# Patient Record
Sex: MALE | Race: BLACK OR AFRICAN AMERICAN | Employment: OTHER | URBAN - METROPOLITAN AREA
[De-identification: names, ages, dates, MRNs, and addresses within clinical notes are randomized per-mention and may not be internally consistent; named-entity substitution may affect disease eponyms.]

---

## 2018-02-11 PROBLEM — N39.0 UTI (URINARY TRACT INFECTION): Status: ACTIVE | Noted: 2018-02-11

## 2018-04-11 PROBLEM — N39.0 UTI (URINARY TRACT INFECTION): Status: RESOLVED | Noted: 2018-02-11 | Resolved: 2018-04-11

## 2020-05-04 ENCOUNTER — APPOINTMENT (OUTPATIENT)
Dept: GENERAL RADIOLOGY | Age: 80
DRG: 699 | End: 2020-05-04
Payer: MEDICARE

## 2020-05-04 ENCOUNTER — APPOINTMENT (OUTPATIENT)
Dept: CT IMAGING | Age: 80
DRG: 699 | End: 2020-05-04
Payer: MEDICARE

## 2020-05-04 ENCOUNTER — HOSPITAL ENCOUNTER (INPATIENT)
Age: 80
LOS: 2 days | Discharge: HOME HEALTH | DRG: 699 | End: 2020-05-07
Attending: INTERNAL MEDICINE | Admitting: INTERNAL MEDICINE
Payer: MEDICARE

## 2020-05-04 PROBLEM — N39.0 COMPLICATED UTI (URINARY TRACT INFECTION): Status: ACTIVE | Noted: 2020-05-04

## 2020-05-04 LAB
ALBUMIN SERPL-MCNC: 3.7 GM/DL (ref 3.4–5)
ALP BLD-CCNC: 120 IU/L (ref 40–129)
ALT SERPL-CCNC: 13 U/L (ref 10–40)
ANION GAP SERPL CALCULATED.3IONS-SCNC: 10 MMOL/L (ref 4–16)
AST SERPL-CCNC: 12 IU/L (ref 15–37)
BACTERIA: ABNORMAL /HPF
BASE EXCESS: 2 (ref 0–3.3)
BASOPHILS ABSOLUTE: 0 K/CU MM
BASOPHILS RELATIVE PERCENT: 0.5 % (ref 0–1)
BETA-HYDROXYBUTYRATE: 1.7 MG/DL (ref 0–3)
BILIRUB SERPL-MCNC: 0.2 MG/DL (ref 0–1)
BILIRUBIN URINE: NEGATIVE MG/DL
BLOOD, URINE: ABNORMAL
BUN BLDV-MCNC: 32 MG/DL (ref 6–23)
CALCIUM SERPL-MCNC: 9 MG/DL (ref 8.3–10.6)
CHLORIDE BLD-SCNC: 97 MMOL/L (ref 99–110)
CLARITY: ABNORMAL
CO2: 22 MMOL/L (ref 21–32)
COLOR: YELLOW
COMMENT: ABNORMAL
CREAT SERPL-MCNC: 1.7 MG/DL (ref 0.9–1.3)
DIFFERENTIAL TYPE: ABNORMAL
EOSINOPHILS ABSOLUTE: 0.2 K/CU MM
EOSINOPHILS RELATIVE PERCENT: 2.9 % (ref 0–3)
GFR AFRICAN AMERICAN: 47 ML/MIN/1.73M2
GFR NON-AFRICAN AMERICAN: 39 ML/MIN/1.73M2
GLUCOSE BLD-MCNC: 300 MG/DL (ref 70–99)
GLUCOSE BLD-MCNC: 378 MG/DL
GLUCOSE BLD-MCNC: 378 MG/DL (ref 70–99)
GLUCOSE BLD-MCNC: 383 MG/DL (ref 70–99)
GLUCOSE BLD-MCNC: 397 MG/DL (ref 70–99)
GLUCOSE BLD-MCNC: 398 MG/DL (ref 70–99)
GLUCOSE, URINE: >500 MG/DL
HCO3 VENOUS: 20.1 MMOL/L (ref 19–25)
HCT VFR BLD CALC: 32.5 % (ref 42–52)
HEMOGLOBIN: 10.2 GM/DL (ref 13.5–18)
IMMATURE NEUTROPHIL %: 0.3 % (ref 0–0.43)
KETONES, URINE: NEGATIVE MG/DL
LEUKOCYTE ESTERASE, URINE: ABNORMAL
LIPASE: 49 IU/L (ref 13–60)
LYMPHOCYTES ABSOLUTE: 1.2 K/CU MM
LYMPHOCYTES RELATIVE PERCENT: 15.6 % (ref 24–44)
MCH RBC QN AUTO: 30.3 PG (ref 27–31)
MCHC RBC AUTO-ENTMCNC: 31.4 % (ref 32–36)
MCV RBC AUTO: 96.4 FL (ref 78–100)
MONOCYTES ABSOLUTE: 0.6 K/CU MM
MONOCYTES RELATIVE PERCENT: 7.3 % (ref 0–4)
MUCUS: ABNORMAL HPF
NITRITE URINE, QUANTITATIVE: POSITIVE
NUCLEATED RBC %: 0 %
O2 SAT, VEN: 87.5 % (ref 50–70)
PCO2, VEN: 27 MMHG (ref 38–52)
PDW BLD-RTO: 14.1 % (ref 11.7–14.9)
PH VENOUS: 7.48 (ref 7.32–7.42)
PH, URINE: 5 (ref 5–8)
PLATELET # BLD: 256 K/CU MM (ref 140–440)
PMV BLD AUTO: 11.7 FL (ref 7.5–11.1)
PO2, VEN: 238 MMHG (ref 28–48)
POTASSIUM SERPL-SCNC: 4.4 MMOL/L (ref 3.5–5.1)
PROTEIN UA: 30 MG/DL
RBC # BLD: 3.37 M/CU MM (ref 4.6–6.2)
RBC URINE: 4 /HPF (ref 0–3)
SEGMENTED NEUTROPHILS ABSOLUTE COUNT: 5.5 K/CU MM
SEGMENTED NEUTROPHILS RELATIVE PERCENT: 73.4 % (ref 36–66)
SODIUM BLD-SCNC: 129 MMOL/L (ref 135–145)
SPECIFIC GRAVITY UA: 1.02 (ref 1–1.03)
SQUAMOUS EPITHELIAL: 1 /HPF
TOTAL IMMATURE NEUTOROPHIL: 0.02 K/CU MM
TOTAL NUCLEATED RBC: 0 K/CU MM
TOTAL PROTEIN: 7 GM/DL (ref 6.4–8.2)
TRICHOMONAS: ABNORMAL /HPF
TROPONIN T: 0.02 NG/ML
TROPONIN T: 0.04 NG/ML
UROBILINOGEN, URINE: NORMAL MG/DL (ref 0.2–1)
WBC # BLD: 7.6 K/CU MM (ref 4–10.5)
WBC CLUMP: ABNORMAL /HPF
WBC UA: 612 /HPF (ref 0–2)

## 2020-05-04 PROCEDURE — 2580000003 HC RX 258: Performed by: PHYSICIAN ASSISTANT

## 2020-05-04 PROCEDURE — 82962 GLUCOSE BLOOD TEST: CPT

## 2020-05-04 PROCEDURE — 71045 X-RAY EXAM CHEST 1 VIEW: CPT

## 2020-05-04 PROCEDURE — 6370000000 HC RX 637 (ALT 250 FOR IP): Performed by: PHYSICIAN ASSISTANT

## 2020-05-04 PROCEDURE — 84484 ASSAY OF TROPONIN QUANT: CPT

## 2020-05-04 PROCEDURE — 6370000000 HC RX 637 (ALT 250 FOR IP): Performed by: NURSE PRACTITIONER

## 2020-05-04 PROCEDURE — 80053 COMPREHEN METABOLIC PANEL: CPT

## 2020-05-04 PROCEDURE — 96372 THER/PROPH/DIAG INJ SC/IM: CPT

## 2020-05-04 PROCEDURE — 74177 CT ABD & PELVIS W/CONTRAST: CPT

## 2020-05-04 PROCEDURE — G0378 HOSPITAL OBSERVATION PER HR: HCPCS

## 2020-05-04 PROCEDURE — 87086 URINE CULTURE/COLONY COUNT: CPT

## 2020-05-04 PROCEDURE — 6360000004 HC RX CONTRAST MEDICATION: Performed by: PHYSICIAN ASSISTANT

## 2020-05-04 PROCEDURE — 96361 HYDRATE IV INFUSION ADD-ON: CPT

## 2020-05-04 PROCEDURE — 87186 SC STD MICRODIL/AGAR DIL: CPT

## 2020-05-04 PROCEDURE — 96365 THER/PROPH/DIAG IV INF INIT: CPT

## 2020-05-04 PROCEDURE — 82805 BLOOD GASES W/O2 SATURATION: CPT

## 2020-05-04 PROCEDURE — 93005 ELECTROCARDIOGRAM TRACING: CPT | Performed by: PHYSICIAN ASSISTANT

## 2020-05-04 PROCEDURE — 93005 ELECTROCARDIOGRAM TRACING: CPT | Performed by: EMERGENCY MEDICINE

## 2020-05-04 PROCEDURE — 85025 COMPLETE CBC W/AUTO DIFF WBC: CPT

## 2020-05-04 PROCEDURE — 6360000002 HC RX W HCPCS: Performed by: NURSE PRACTITIONER

## 2020-05-04 PROCEDURE — 87077 CULTURE AEROBIC IDENTIFY: CPT

## 2020-05-04 PROCEDURE — 2580000003 HC RX 258: Performed by: NURSE PRACTITIONER

## 2020-05-04 PROCEDURE — 82010 KETONE BODYS QUAN: CPT

## 2020-05-04 PROCEDURE — 83690 ASSAY OF LIPASE: CPT

## 2020-05-04 PROCEDURE — 99285 EMERGENCY DEPT VISIT HI MDM: CPT

## 2020-05-04 PROCEDURE — 6360000002 HC RX W HCPCS: Performed by: PHYSICIAN ASSISTANT

## 2020-05-04 PROCEDURE — 81001 URINALYSIS AUTO W/SCOPE: CPT

## 2020-05-04 RX ORDER — SODIUM CHLORIDE 9 MG/ML
INJECTION, SOLUTION INTRAVENOUS CONTINUOUS
Status: DISCONTINUED | OUTPATIENT
Start: 2020-05-04 | End: 2020-05-07 | Stop reason: HOSPADM

## 2020-05-04 RX ORDER — HYDROCHLOROTHIAZIDE 12.5 MG/1
12.5 CAPSULE, GELATIN COATED ORAL DAILY
Status: ON HOLD | COMMUNITY
End: 2020-05-07 | Stop reason: HOSPADM

## 2020-05-04 RX ORDER — ACETAMINOPHEN 650 MG/1
650 SUPPOSITORY RECTAL EVERY 6 HOURS PRN
Status: DISCONTINUED | OUTPATIENT
Start: 2020-05-04 | End: 2020-05-07 | Stop reason: HOSPADM

## 2020-05-04 RX ORDER — DOCUSATE SODIUM 100 MG/1
100 CAPSULE, LIQUID FILLED ORAL 2 TIMES DAILY PRN
Status: DISCONTINUED | OUTPATIENT
Start: 2020-05-04 | End: 2020-05-07 | Stop reason: HOSPADM

## 2020-05-04 RX ORDER — LABETALOL 200 MG/1
300 TABLET, FILM COATED ORAL 2 TIMES DAILY
Status: DISCONTINUED | OUTPATIENT
Start: 2020-05-04 | End: 2020-05-07 | Stop reason: HOSPADM

## 2020-05-04 RX ORDER — ASPIRIN 81 MG/1
324 TABLET, CHEWABLE ORAL ONCE
Status: COMPLETED | OUTPATIENT
Start: 2020-05-04 | End: 2020-05-04

## 2020-05-04 RX ORDER — POLYETHYLENE GLYCOL 3350 17 G/17G
17 POWDER, FOR SOLUTION ORAL DAILY PRN
Status: DISCONTINUED | OUTPATIENT
Start: 2020-05-04 | End: 2020-05-07 | Stop reason: HOSPADM

## 2020-05-04 RX ORDER — SODIUM CHLORIDE 0.9 % (FLUSH) 0.9 %
10 SYRINGE (ML) INJECTION EVERY 12 HOURS SCHEDULED
Status: DISCONTINUED | OUTPATIENT
Start: 2020-05-04 | End: 2020-05-07 | Stop reason: HOSPADM

## 2020-05-04 RX ORDER — 0.9 % SODIUM CHLORIDE 0.9 %
1000 INTRAVENOUS SOLUTION INTRAVENOUS ONCE
Status: COMPLETED | OUTPATIENT
Start: 2020-05-04 | End: 2020-05-04

## 2020-05-04 RX ORDER — ACETAMINOPHEN 325 MG/1
650 TABLET ORAL EVERY 6 HOURS PRN
Status: DISCONTINUED | OUTPATIENT
Start: 2020-05-04 | End: 2020-05-07 | Stop reason: HOSPADM

## 2020-05-04 RX ORDER — SODIUM CHLORIDE 0.9 % (FLUSH) 0.9 %
10 SYRINGE (ML) INJECTION PRN
Status: DISCONTINUED | OUTPATIENT
Start: 2020-05-04 | End: 2020-05-07 | Stop reason: HOSPADM

## 2020-05-04 RX ORDER — IPRATROPIUM BROMIDE AND ALBUTEROL SULFATE 2.5; .5 MG/3ML; MG/3ML
SOLUTION RESPIRATORY (INHALATION)
Status: COMPLETED
Start: 2020-05-04 | End: 2020-05-04

## 2020-05-04 RX ORDER — NICOTINE POLACRILEX 4 MG
15 LOZENGE BUCCAL PRN
Status: DISCONTINUED | OUTPATIENT
Start: 2020-05-04 | End: 2020-05-07 | Stop reason: HOSPADM

## 2020-05-04 RX ORDER — PROMETHAZINE HYDROCHLORIDE 25 MG/1
12.5 TABLET ORAL EVERY 6 HOURS PRN
Status: DISCONTINUED | OUTPATIENT
Start: 2020-05-04 | End: 2020-05-07 | Stop reason: HOSPADM

## 2020-05-04 RX ORDER — DEXTROSE MONOHYDRATE 50 MG/ML
100 INJECTION, SOLUTION INTRAVENOUS PRN
Status: DISCONTINUED | OUTPATIENT
Start: 2020-05-04 | End: 2020-05-07 | Stop reason: HOSPADM

## 2020-05-04 RX ORDER — ONDANSETRON 2 MG/ML
4 INJECTION INTRAMUSCULAR; INTRAVENOUS EVERY 6 HOURS PRN
Status: DISCONTINUED | OUTPATIENT
Start: 2020-05-04 | End: 2020-05-07 | Stop reason: HOSPADM

## 2020-05-04 RX ORDER — DEXTROSE MONOHYDRATE 25 G/50ML
12.5 INJECTION, SOLUTION INTRAVENOUS PRN
Status: DISCONTINUED | OUTPATIENT
Start: 2020-05-04 | End: 2020-05-07 | Stop reason: HOSPADM

## 2020-05-04 RX ADMIN — SODIUM CHLORIDE 1000 ML: 9 INJECTION, SOLUTION INTRAVENOUS at 15:51

## 2020-05-04 RX ADMIN — SODIUM CHLORIDE: 9 INJECTION, SOLUTION INTRAVENOUS at 21:43

## 2020-05-04 RX ADMIN — ENOXAPARIN SODIUM 30 MG: 30 INJECTION SUBCUTANEOUS at 20:34

## 2020-05-04 RX ADMIN — LABETALOL HYDROCHLORIDE 300 MG: 200 TABLET, FILM COATED ORAL at 21:40

## 2020-05-04 RX ADMIN — CEFTRIAXONE SODIUM 1 G: 1 INJECTION, POWDER, FOR SOLUTION INTRAMUSCULAR; INTRAVENOUS at 16:10

## 2020-05-04 RX ADMIN — ASPIRIN 81 MG 324 MG: 81 TABLET ORAL at 15:47

## 2020-05-04 RX ADMIN — IOPAMIDOL 75 ML: 755 INJECTION, SOLUTION INTRAVENOUS at 16:56

## 2020-05-04 NOTE — ED PROVIDER NOTES
EKG obtained at 1558    Sinus rhythm with first-degree AV block, rate of 60 bpm, MS interval of 240 ms, otherwise normal intervals, no ST elevation. Otherwise normal EKG.   No previous to compare     Korin Damon MD  05/04/20 0868

## 2020-05-04 NOTE — ED NOTES
Clean catch urine obtained via mccrary cath to urinal and not out of leg bag. Specimen obtained out of urinal and transferred to specimen cup and sent to lab. Pt tolerated well. Pt updated on plan of care.  Pt denies needs at this time     Miriam Ayala RN  05/04/20 8386

## 2020-05-04 NOTE — ED PROVIDER NOTES
RBC 3.37 (L) 4.6 - 6.2 M/CU MM    Hemoglobin 10.2 (L) 13.5 - 18.0 GM/DL    Hematocrit 32.5 (L) 42 - 52 %    MCV 96.4 78 - 100 FL    MCH 30.3 27 - 31 PG    MCHC 31.4 (L) 32.0 - 36.0 %    RDW 14.1 11.7 - 14.9 %    Platelets 991 753 - 449 K/CU MM    MPV 11.7 (H) 7.5 - 11.1 FL    Differential Type AUTOMATED DIFFERENTIAL     Segs Relative 73.4 (H) 36 - 66 %    Lymphocytes % 15.6 (L) 24 - 44 %    Monocytes % 7.3 (H) 0 - 4 %    Eosinophils % 2.9 0 - 3 %    Basophils % 0.5 0 - 1 %    Segs Absolute 5.5 K/CU MM    Lymphocytes Absolute 1.2 K/CU MM    Monocytes Absolute 0.6 K/CU MM    Eosinophils Absolute 0.2 K/CU MM    Basophils Absolute 0.0 K/CU MM    Nucleated RBC % 0.0 %    Total Nucleated RBC 0.0 K/CU MM    Total Immature Neutrophil 0.02 K/CU MM    Immature Neutrophil % 0.3 0 - 0.43 %   CMP   Result Value Ref Range    Sodium 129 (L) 135 - 145 MMOL/L    Potassium 4.4 3.5 - 5.1 MMOL/L    Chloride 97 (L) 99 - 110 mMol/L    CO2 22 21 - 32 MMOL/L    BUN 32 (H) 6 - 23 MG/DL    CREATININE 1.7 (H) 0.9 - 1.3 MG/DL    Glucose 398 (H) 70 - 99 MG/DL    Calcium 9.0 8.3 - 10.6 MG/DL    Alb 3.7 3.4 - 5.0 GM/DL    Total Protein 7.0 6.4 - 8.2 GM/DL    Total Bilirubin 0.2 0.0 - 1.0 MG/DL    ALT 13 10 - 40 U/L    AST 12 (L) 15 - 37 IU/L    Alkaline Phosphatase 120 40 - 129 IU/L    GFR Non- 39 (L) >60 mL/min/1.73m2    GFR  47 (L) >60 mL/min/1.73m2    Anion Gap 10 4 - 16   Lipase   Result Value Ref Range    Lipase 49 13 - 60 IU/L   Urinalysis   Result Value Ref Range    Color, UA YELLOW YELLOW    Clarity, UA SLIGHTLY CLOUDY (A) CLEAR    Glucose, Urine >500 (A) NEGATIVE MG/DL    Bilirubin Urine NEGATIVE NEGATIVE MG/DL    Ketones, Urine NEGATIVE NEGATIVE MG/DL    Specific Gravity, UA 1.017 1.001 - 1.035    Blood, Urine SMALL (A) NEGATIVE    pH, Urine 5.0 5.0 - 8.0    Protein, UA 30 (A) NEGATIVE MG/DL    Urobilinogen, Urine NORMAL 0.2 - 1.0 MG/DL    Nitrite Urine, Quantitative POSITIVE (A) NEGATIVE    Leukocyte Esterase, Urine MODERATE (A) NEGATIVE    RBC, UA 4 (H) 0 - 3 /HPF    WBC,  (H) 0 - 2 /HPF    Bacteria, UA MANY (A) NEGATIVE /HPF    WBC Clumps, UA MODERATE /HPF    Squam Epithel, UA 1 /HPF    Mucus, UA RARE (A) NEGATIVE HPF    Trichomonas, UA NONE SEEN NONE SEEN /HPF   Beta-Hydroxybutyrate   Result Value Ref Range    Beta-Hydroxybutyrate 1.7 0.0 - 3.0 MG/DL   Troponin   Result Value Ref Range    Troponin T 0.035 (H) <0.01 NG/ML   POC Blood Glucose   Result Value Ref Range    Glucose 378 mg/dL   POCT Glucose   Result Value Ref Range    POC Glucose 383 (H) 70 - 99 MG/DL   POCT Glucose   Result Value Ref Range    POC Glucose 378 (H) 70 - 99 MG/DL   POCT Glucose   Result Value Ref Range    POC Glucose 300 (H) 70 - 99 MG/DL   EKG 12 Lead   Result Value Ref Range    Ventricular Rate 67 BPM    Atrial Rate 67 BPM    P-R Interval 206 ms    QRS Duration 70 ms    Q-T Interval 400 ms    QTc Calculation (Bazett) 422 ms    P Axis 78 degrees    R Axis 2 degrees    T Axis 21 degrees    Diagnosis       Undetermined rhythm  Otherwise normal ECG  When compared with ECG of 13-AUG-2018 14:24,  Current undetermined rhythm precludes rhythm comparison, needs review         EKG Interpretation  Please see ED physician's note for EKG interpretation      RADIOLOGY    XR CHEST PORTABLE   Final Result   No acute cardiopulmonary findings. CT ABDOMEN PELVIS W IV CONTRAST    (Results Pending)         ED COURSE & MEDICAL DECISION MAKING       Vital signs and nursing notes reviewed during ED course. I have independently evaluated this patient . Supervising MD present in the Emergency Department, available for consultation, throughout entirety of  patient care. With ongoing COVID-19 pandemic I did have mask, protective eyewear and gloves during my interactions with patient. Patient presents as above. He is hemodynamically stable, afebrile, not tachycardic. Point-of-care glucose on arrival 383. Fluids initiated.    Lab work with

## 2020-05-04 NOTE — H&P
nightly, Disp: , Rfl:     docusate sodium (COLACE, DULCOLAX) 100 MG CAPS, Take 100 mg by mouth 2 times daily as needed for Constipation. , Disp: 20 capsule, Rfl: 0    labetalol (NORMODYNE) 300 MG tablet, Take 1 tablet by mouth 2 times daily. , Disp: 60 tablet, Rfl: 5    glimepiride (AMARYL) 4 MG tablet, Take 4 mg by mouth every morning (before breakfast). , Disp: , Rfl:     History of present illness     Chief Complaint: Hyperglycemia and Fatigue      Rossi Natarajan is a 78 y.o.  male  who presents with generalized weakness and hyperglycemia that started this morning. Patient states this morning his leg gave out and was on the floor for 10 minutes. He was able to get up then he went back to his room. He decided to check his BG because he taught that probably his BG dropped and it was over 440. Patient has never had his BG go up that high. He decided to come to ED. He has chronic suprapubic catheter secondary to prostate cancer. His catheter was changed 2 weeks and he saw Dr. Margarita Kerns then. Denies fever, chills, abd. Pain, N/V/D, or penile discharge. Other medical hx are HTN, HLD, OA, DMII, and former smoker. Review of Systems   Ten point ROS reviewed and negative, unless as noted below. GENERAL:  Denies fever, chills, night sweats, or changes in weight. EYES:  Denies recent visual changes. ENT:  Denies ear pain, hearing loss or tinnitus  RESP:  Denies any cough, dyspnea, or wheezing. CV:  Denies any chest pain with exertion or at rest, palpitations, syncope, or edema. GI:  Denies any dysphagia, nausea, vomiting, abdominal pain, heartburn, changes in bowel habit, melena or rectal bleeding  MUSCULOSKELETAL:  Denies any joint swelling, joint pain, or loss of range of motion. NEURO:  Denies any headaches, tremors, dizziness, vertigo, memory loss, confusion, generalized weakness, numbness or tingling. PSYCH:  Denies any sleeping problems, history of abuse, marital discord.   HEME/LYMPHATIC/IMMUNO:  Denies , bruising, bleeding abnormalities   ENDO:  Denies any heat or cold intolerance, panemiaolyuria or polydipsia. Hyperglycemia      Objective:   No intake or output data in the 24 hours ending 05/04/20 1710   Vitals:   Vitals:    05/04/20 1632   BP: (!) 155/89   Pulse: 66   Resp: 14   Temp:    SpO2: 100%     Physical Exam:   Gen:  awake, alert, cooperative, no apparent distress  EYES:Lids and lashes normal, pupils equal, round ,extra ocular muscles intact, sclera clear, conjunctiva normal  ENT:  Normocephalic, oral pharynx with moist mucus membranes  NECK:  Supple, symmetrical, trachea midline, no adenopathy,  LUNGS:  Clear to auscultate bilaterally, no rales ronchi or wheezing noted. CARDIOVASCULAR:  regular rate and rhythm, normal S1 and S2,no murmur noted, peripheral pulses 2+, no pitting edema  ABDOMEN: Normal BS, Non tender, non distended, no HSM noted. Suprapubic catheter in place. No erythema, drainage, or swelling noted  MUSCULOSKELETAL:  ROM of all extremities grossly wnl  NEUROLOGIC: AOx 3,  Cranial nerves II-XII are grossly intact. Motor is 5 out of 5 bilaterally. Sensory is intact, no lateralizing findings. 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     Allergies: No Known Allergies    FAM HX: family history includes Cancer in his brother, maternal uncle, and mother; Diabetes in his brother, father, maternal

## 2020-05-04 NOTE — ED NOTES
Pt updated on plan of care. Pt denies needs a this time. No distress noted. This RN will continue to monitor patient for needs.  Call light in reach     Joe Hernandes RN  05/04/20 2173

## 2020-05-04 NOTE — ED NOTES
1642 paged hospitalist     Bekah Alexander  05/04/20 8094  [facility] with apogee in ED.      Bekah Alexander  05/04/20 2144

## 2020-05-05 PROBLEM — N39.0 UTI (URINARY TRACT INFECTION): Status: ACTIVE | Noted: 2020-05-05

## 2020-05-05 LAB
ANION GAP SERPL CALCULATED.3IONS-SCNC: 11 MMOL/L (ref 4–16)
BUN BLDV-MCNC: 28 MG/DL (ref 6–23)
CALCIUM SERPL-MCNC: 9 MG/DL (ref 8.3–10.6)
CHLORIDE BLD-SCNC: 102 MMOL/L (ref 99–110)
CO2: 22 MMOL/L (ref 21–32)
CREAT SERPL-MCNC: 1.6 MG/DL (ref 0.9–1.3)
EKG ATRIAL RATE: 67 BPM
EKG DIAGNOSIS: NORMAL
EKG P AXIS: 78 DEGREES
EKG P-R INTERVAL: 206 MS
EKG Q-T INTERVAL: 400 MS
EKG QRS DURATION: 70 MS
EKG QTC CALCULATION (BAZETT): 422 MS
EKG R AXIS: 2 DEGREES
EKG T AXIS: 21 DEGREES
EKG VENTRICULAR RATE: 67 BPM
GFR AFRICAN AMERICAN: 50 ML/MIN/1.73M2
GFR NON-AFRICAN AMERICAN: 42 ML/MIN/1.73M2
GLUCOSE BLD-MCNC: 173 MG/DL (ref 70–99)
GLUCOSE BLD-MCNC: 225 MG/DL (ref 70–99)
GLUCOSE BLD-MCNC: 350 MG/DL (ref 70–99)
GLUCOSE BLD-MCNC: 360 MG/DL (ref 70–99)
GLUCOSE BLD-MCNC: 82 MG/DL (ref 70–99)
HCT VFR BLD CALC: 31.1 % (ref 42–52)
HEMOGLOBIN: 9.9 GM/DL (ref 13.5–18)
MCH RBC QN AUTO: 30.5 PG (ref 27–31)
MCHC RBC AUTO-ENTMCNC: 31.8 % (ref 32–36)
MCV RBC AUTO: 95.7 FL (ref 78–100)
PDW BLD-RTO: 14.4 % (ref 11.7–14.9)
PLATELET # BLD: 239 K/CU MM (ref 140–440)
PMV BLD AUTO: 11.4 FL (ref 7.5–11.1)
POTASSIUM SERPL-SCNC: 4 MMOL/L (ref 3.5–5.1)
RBC # BLD: 3.25 M/CU MM (ref 4.6–6.2)
SODIUM BLD-SCNC: 135 MMOL/L (ref 135–145)
TROPONIN T: 0.02 NG/ML
WBC # BLD: 6.9 K/CU MM (ref 4–10.5)

## 2020-05-05 PROCEDURE — 85027 COMPLETE CBC AUTOMATED: CPT

## 2020-05-05 PROCEDURE — 93005 ELECTROCARDIOGRAM TRACING: CPT | Performed by: EMERGENCY MEDICINE

## 2020-05-05 PROCEDURE — 6360000002 HC RX W HCPCS: Performed by: INTERNAL MEDICINE

## 2020-05-05 PROCEDURE — 6360000002 HC RX W HCPCS: Performed by: NURSE PRACTITIONER

## 2020-05-05 PROCEDURE — 93010 ELECTROCARDIOGRAM REPORT: CPT | Performed by: INTERNAL MEDICINE

## 2020-05-05 PROCEDURE — 82962 GLUCOSE BLOOD TEST: CPT

## 2020-05-05 PROCEDURE — 80048 BASIC METABOLIC PNL TOTAL CA: CPT

## 2020-05-05 PROCEDURE — 2580000003 HC RX 258: Performed by: NURSE PRACTITIONER

## 2020-05-05 PROCEDURE — 84484 ASSAY OF TROPONIN QUANT: CPT

## 2020-05-05 PROCEDURE — 6370000000 HC RX 637 (ALT 250 FOR IP): Performed by: NURSE PRACTITIONER

## 2020-05-05 PROCEDURE — 96367 TX/PROPH/DG ADDL SEQ IV INF: CPT

## 2020-05-05 PROCEDURE — 94761 N-INVAS EAR/PLS OXIMETRY MLT: CPT

## 2020-05-05 PROCEDURE — 1200000000 HC SEMI PRIVATE

## 2020-05-05 PROCEDURE — 36415 COLL VENOUS BLD VENIPUNCTURE: CPT

## 2020-05-05 RX ORDER — CIPROFLOXACIN 2 MG/ML
400 INJECTION, SOLUTION INTRAVENOUS EVERY 12 HOURS
Status: DISCONTINUED | OUTPATIENT
Start: 2020-05-05 | End: 2020-05-07 | Stop reason: HOSPADM

## 2020-05-05 RX ADMIN — INSULIN DETEMIR 40 UNITS: 100 INJECTION, SOLUTION SUBCUTANEOUS at 09:11

## 2020-05-05 RX ADMIN — ENOXAPARIN SODIUM 30 MG: 30 INJECTION SUBCUTANEOUS at 21:20

## 2020-05-05 RX ADMIN — SODIUM CHLORIDE: 9 INJECTION, SOLUTION INTRAVENOUS at 06:17

## 2020-05-05 RX ADMIN — CIPROFLOXACIN 400 MG: 2 INJECTION, SOLUTION INTRAVENOUS at 09:21

## 2020-05-05 RX ADMIN — LABETALOL HYDROCHLORIDE 300 MG: 200 TABLET, FILM COATED ORAL at 09:10

## 2020-05-05 RX ADMIN — CIPROFLOXACIN 400 MG: 2 INJECTION, SOLUTION INTRAVENOUS at 21:20

## 2020-05-05 RX ADMIN — SODIUM CHLORIDE: 9 INJECTION, SOLUTION INTRAVENOUS at 17:12

## 2020-05-05 RX ADMIN — SODIUM CHLORIDE, PRESERVATIVE FREE 10 ML: 5 INJECTION INTRAVENOUS at 09:11

## 2020-05-05 RX ADMIN — LABETALOL HYDROCHLORIDE 300 MG: 200 TABLET, FILM COATED ORAL at 21:20

## 2020-05-05 ASSESSMENT — PAIN SCALES - GENERAL
PAINLEVEL_OUTOF10: 0

## 2020-05-05 NOTE — CONSULTS
[facility] [address]   Consult Note  [facility] 1 2 3 4 5    Date: 2020   Patient: Bertha Velez   : 1940   DOA: 2020   MRN: [id number]   ROOM#: [id number]     Reason for Consult:  Complicated UTI  Requesting Physician:  Demetrice Caal [address] [place] [address]  Collaborating Urologist on Call at time of admission:  [facility]:  Fatigue, hyperglycemia    History Obtained From:  patient, electronic medical record    HISTORY OF PRESENT ILLNESS:                The patient is a 78 y.o. male with significant past medical history of prostate cancer s/p RPP in , suprapubic catheter secondary to prostate cancer treatment, HLD, and DM who presented with hyperglycemia and generalized weakness. ED provider note: hyperglycemia and generalized weakness. Patient states that he began feeling generally unwell this morning, states that his legs \"gave out\" while walking into the kitchen this morning. He states he checked his blood sugar and it was over 400. He did take dose of insulin, however continued to feel unwell several hours later with generalized fatigue and weakness so checked again and it was in the 300s. He denies any other associated pain or symptoms. He does have a suprapubic catheter following prostate cancer treatment and issues with incontinence. He did have an episode of diarrhea this morning. No nausea or vomiting. He states his been compliant with insulin and medication regimen. No cough, chest pain, shortness of breath, fevers or chills.     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  No focal consolidation, pleural effusion or pneumothorax. The cardiomediastinal silhouette is stable. No overt pulmonary edema. The osseous structures are stable. No acute cardiopulmonary findings. Assessment & Plan:      Stefanie Gonzalez is a 78y.o. year old male admitted 5/1/8744 for complicated UTI. PT known to Dr. Alondra White, last seen in office 4/30/20. 1) Complicated UTI   7/4/26 UA dip pos nit, mod leuks, small blood   Urine culture positive, pending sensitivities   WBC 6.9, afebrile   On IV Rocephin  2) Left Hydronephrosis: Secondary to ureteral stricture   5/4/20 CT a/p: Persistent moderate left hydronephrosis and mild left hydroureter likely with superimposed chronic left pyelitis and ureteritis. Persistent abrupt caliber change of the left ureter at the pelvic brim suggested stricture, potentially related to prior radiotherapy or infection. New borderline left renal atrophy is likely a long-term sequela of the hydroureteronephrosis. Possible mild cystitis. Decreased left renal enhancement likely due to underlying dysfunction. Cr 1.6, baseline  3) Prostate Cancer   S/p RPP 1996 4/2020 PSA 1.36, stable   On Eligard  4) Suprapubic Catheter   Placed in 2013 after AUS explanted 5/28/13 (eroded)   Last exchanged 2 weeks ago   Currently draining well   Exchange SP tube tomorrow   Will continue to follow    Patient seen and examined, chart reviewed.      Electronically signed by Tasha Whitaker PA-C on 5/5/2020 at 7:53 AM

## 2020-05-05 NOTE — PROGRESS NOTES
[facility]    Hospitalist Progress Note      Name:  Elisabeth Fountain /Age/Sex: 1940  (78 y.o. male)   MRN & CSN:  [id number] & [id number] Admission Date/Time: 2020 12:48 PM   Location:  [address] [place] [address] PCP: Quan Baugh MD         Hospital Day: 2    Assessment and Plan:   Elisabeth Fountain is a 78 y.o.  male  who presents with hyperglycemia and fatigue    Complicated UTI in the presence of Chronic suprapubic Cath    Chronic suprapubic catheter due to prostate CA  Last catheter changed 2 weeks ago. CT abd.: No acute findings in the abdomen or pelvis. UA: Leukocytes, bacterial, pyuria, and positive Nitrite  Based on previous urine culture-change Rocephin to Cipro- pending current urine cx  Urology consult    Persistent moderate left hydronephrosis and mild left hydroureter     Likely with superimposed chronic left pyelitis and ureteritis. Urology consult    Slightly elevated troponin -no chest pain, no EKG changes     Moderate hyponatremia due to thiazide - resolved by holding his Thiazide      Hyperglycemia in DMII with chronic insulin use    A1c 9.4 (2018).   likely 2/2 infection, continue with insulin sliding scale    CKD - Creat at baseline at 1.7,  Monitor renal panel     HTN - Continue Labetalol, Hold HCTZ     Chronic conditions    HLD  OA  Hx of prostate CA    Diet DIET CARB CONTROL;   DVT Prophylaxis [] Lovenox, []  Heparin, [] SCDs, []No VTE prophylaxis, patient ambulating   GI Prophylaxis [] PPI, [] H2 Blocker, [] No GI prophylaxis, patient is receiving diet/Tube Feeds   Code Status Full Code   Disposition Patient requires continued admission due to UTI/hydroureter   MDM [] Low, [x] Moderate,[]  High     History of Present Illness: Subjective     Patient Seen & Examined at the bedside      Patient is resting in bed with no distress   Denies any shortness of breath or chest pain at this time  Continues to have some weakness and fatigue    Ten point ROS reviewed negative, unless as noted

## 2020-05-06 ENCOUNTER — APPOINTMENT (OUTPATIENT)
Dept: ULTRASOUND IMAGING | Age: 80
DRG: 699 | End: 2020-05-06
Payer: MEDICARE

## 2020-05-06 LAB
CULTURE: ABNORMAL
GLUCOSE BLD-MCNC: 108 MG/DL (ref 70–99)
GLUCOSE BLD-MCNC: 275 MG/DL (ref 70–99)
GLUCOSE BLD-MCNC: 309 MG/DL (ref 70–99)
GLUCOSE BLD-MCNC: 319 MG/DL (ref 70–99)
Lab: ABNORMAL
SPECIMEN: ABNORMAL
TOTAL COLONY COUNT: ABNORMAL

## 2020-05-06 PROCEDURE — 6360000002 HC RX W HCPCS: Performed by: NURSE PRACTITIONER

## 2020-05-06 PROCEDURE — 94761 N-INVAS EAR/PLS OXIMETRY MLT: CPT

## 2020-05-06 PROCEDURE — 1200000000 HC SEMI PRIVATE

## 2020-05-06 PROCEDURE — 76775 US EXAM ABDO BACK WALL LIM: CPT

## 2020-05-06 PROCEDURE — 97166 OT EVAL MOD COMPLEX 45 MIN: CPT

## 2020-05-06 PROCEDURE — 97530 THERAPEUTIC ACTIVITIES: CPT

## 2020-05-06 PROCEDURE — 6370000000 HC RX 637 (ALT 250 FOR IP): Performed by: NURSE PRACTITIONER

## 2020-05-06 PROCEDURE — 6360000002 HC RX W HCPCS: Performed by: INTERNAL MEDICINE

## 2020-05-06 PROCEDURE — 2580000003 HC RX 258: Performed by: NURSE PRACTITIONER

## 2020-05-06 PROCEDURE — 82962 GLUCOSE BLOOD TEST: CPT

## 2020-05-06 RX ADMIN — LABETALOL HYDROCHLORIDE 300 MG: 200 TABLET, FILM COATED ORAL at 20:24

## 2020-05-06 RX ADMIN — SODIUM CHLORIDE: 9 INJECTION, SOLUTION INTRAVENOUS at 04:04

## 2020-05-06 RX ADMIN — LABETALOL HYDROCHLORIDE 300 MG: 200 TABLET, FILM COATED ORAL at 08:44

## 2020-05-06 RX ADMIN — SODIUM CHLORIDE: 9 INJECTION, SOLUTION INTRAVENOUS at 14:44

## 2020-05-06 RX ADMIN — INSULIN DETEMIR 40 UNITS: 100 INJECTION, SOLUTION SUBCUTANEOUS at 08:45

## 2020-05-06 RX ADMIN — SODIUM CHLORIDE: 9 INJECTION, SOLUTION INTRAVENOUS at 08:44

## 2020-05-06 RX ADMIN — CIPROFLOXACIN 400 MG: 2 INJECTION, SOLUTION INTRAVENOUS at 08:45

## 2020-05-06 RX ADMIN — ENOXAPARIN SODIUM 30 MG: 30 INJECTION SUBCUTANEOUS at 20:24

## 2020-05-06 RX ADMIN — CIPROFLOXACIN 400 MG: 2 INJECTION, SOLUTION INTRAVENOUS at 20:24

## 2020-05-06 RX ADMIN — HYDRALAZINE HYDROCHLORIDE 10 MG: 20 INJECTION INTRAMUSCULAR; INTRAVENOUS at 23:08

## 2020-05-06 ASSESSMENT — PAIN SCALES - GENERAL
PAINLEVEL_OUTOF10: 0

## 2020-05-06 NOTE — PROGRESS NOTES
sup-sit, sit-stand, functional mobility, stand to sit        Safety: patient left in chair with chair alarm, call light within reach, RN notified, gait belt used. Assessment:  Pt is a 77 yo male admitted from home for complicated UTI. Pt currently presents w/ deficits in ADL and high level IADL independence, functional activity tolerance, dynamic sitting and standing balance and tolerance and functional transfers. BUE strength. Pt would benefit from continued acute care OT services w/ discharge to SNF vs [facility] w/ assist PRN  Complexity: Moderate  Prognosis: Good, no significant barriers to participation at this time.    Plan  Times per week: 2x+  Times per day: Daily  Current Treatment Recommendations: Strengthening, Endurance Training, Patient/Caregiver Education & Training, Equipment Evaluation, Education, & procurement, Balance Training, Pain Management, Self-Care / ADL, Functional Mobility Training, Safety Education & Training, Home Management Training     Equipment: defer    Goals:  Pt goal: go home  Time Frame for STGs: discharge  Goal 1: Pt will perform UE ADLs Independent  Goal 2: Pt will perform LE ADLs Ayse w/ AD  Goal 3: Pt will perform toileting Ayse  Goal 4: Pt will perform functional transfer w/ AD Ayse  Goal 5: Pt will perform functional mobility w/ AD Ayse  Goal 6: Pt will perform therex/theract in order to increase functional activity tolerance and dynamic standing balance    Treatment plan:  Pt will perform functional task in stand reaching in all 3 planes in order to increase dynamic standing balance and functional activity tolerance    Recommendations for NURSING activity: Up to chair for all 3 meals and up to [facility] for all toileting needs    Time:   Time in: 1037  Time out: 1055  Timed treatment minutes: 8 minutes  Total time: 18 minutes    Electronically signed by:    Tatiana BOO/L [id number]  11:24 AM,5/6/2020

## 2020-05-06 NOTE — PROGRESS NOTES
[facility]    Hospitalist Progress Note      Name:  Steve Weber /Age/Sex: 1940  (78 y.o. male)   MRN & CSN:  [id number] & [id number] Admission Date/Time: 2020 12:48 PM   Location:  [facility]/1122-A PCP: Gino Jones MD         Hospital Day: 3    Assessment and Plan:   Steve Weber is a 78 y.o.  male  who presents with hyperglycemia and fatigue    Complicated UTI in the presence of Chronic suprapubic Cath    Chronic suprapubic catheter due to prostate CA  Last catheter changed 2 weeks ago. CT abd.: No acute findings in the abdomen or pelvis. IV Cipro- pending urine cx and sensitivity   Urology consult    Persistent moderate left hydronephrosis and mild left hydroureter     Likely with superimposed chronic left pyelitis and ureteritis. Urology following     Slightly elevated troponin -no chest pain, no EKG changes     Moderate hyponatremia due to thiazide - resolved by holding his Thiazide      Hyperglycemia in DMII with chronic insulin use    A1c 9.4 (2018).  likely 2/2 infection, continue with insulin sliding scale    CKD - Creat at baseline at 1.7,  Monitor renal panel     HTN - Continue Labetalol, Hold HCTZ     Chronic conditions    HLD  OA  Hx of prostate CA    Diet DIET CARB CONTROL;   DVT Prophylaxis [] Lovenox, []  Heparin, [] SCDs, []No VTE prophylaxis, patient ambulating   GI Prophylaxis [] PPI, [] H2 Blocker, [] No GI prophylaxis, patient is receiving diet/Tube Feeds   Code Status Full Code   Disposition Patient requires continued admission due to UTI/hydroureter   MDM [] Low, [x] Moderate,[]  High     History of Present Illness: Subjective     Patient Seen & Examined at the bedside      Patient is resting in bed with no distress while on room air    Denies any shortness of breath or chest pain at this time  Didn't sleep much last night     Ten point ROS reviewed negative, unless as noted above    Objective:        Intake/Output Summary (Last 24 hours) at 2020 1123  Last data filed at 5/6/2020 9594  Gross per 24 hour   Intake 240 ml   Output 1650 ml   Net -1410 ml      Vitals:   Vitals:    05/06/20 0830   BP: (!) 157/75   Pulse: 69   Resp: 17   Temp: 97.6 °F (36.4 °C)   SpO2: 99%     Physical Exam:    GEN Awake male, resting in bed in no apparent distress. Appears given age. HENT Mucous membranes are moist.   RESP Clear to auscultation, no wheezes, rales or rhonchi. CARDIO/VASC -S1/S2 auscultated. Regular rate without appreciable murmurs, rubs, or gallops. Peripheral pulses equal bilaterally and palpable. No peripheral edema. GI Abdomen is soft without significant tenderness, masses, or guarding. Bowel sounds are normoactive. Rectal exam deferred.     suprapubic catheter in place     Medications:   Medications:    ciprofloxacin  400 mg Intravenous Q12H    insulin detemir  40 Units Subcutaneous QAM    labetalol  300 mg Oral BID    insulin lispro  0-6 Units Subcutaneous TID WC    insulin lispro  0-3 Units Subcutaneous Nightly    sodium chloride flush  10 mL Intravenous 2 times per day    enoxaparin  30 mg Subcutaneous Daily      Infusions:    dextrose      sodium chloride 100 mL/hr at 05/06/20 0844     PRN Meds: docusate sodium, 100 mg, BID PRN  glucose, 15 g, PRN  dextrose, 12.5 g, PRN  glucagon (rDNA), 1 mg, PRN  dextrose, 100 mL/hr, PRN  sodium chloride flush, 10 mL, PRN  acetaminophen, 650 mg, Q6H PRN    Or  acetaminophen, 650 mg, Q6H PRN  polyethylene glycol, 17 g, Daily PRN  promethazine, 12.5 mg, Q6H PRN    Or  ondansetron, 4 mg, Q6H PRN          Electronically signed by Lizz St MD on 5/6/2020 at 11:23 AM

## 2020-05-06 NOTE — PROGRESS NOTES
Urine Culture: COLIFORM HEAVY GROWTH ID & SENSITIVITY IN PROGRESS and GRAM NEGATIVE BACILLI HEAVY GROWTH ID & SENSITIVITY IN PROGRESS    Imaging:   Ct Abdomen Pelvis W Iv Contrast    Result Date: 5/4/2020  EXAMINATION: CT OF THE ABDOMEN AND PELVIS WITH CONTRAST 5/4/2020 4:53 pm TECHNIQUE: CT of the abdomen and pelvis was performed with the administration of intravenous contrast. Multiplanar reformatted images are provided for review. Dose modulation, iterative reconstruction, and/or weight based adjustment of the mA/kV was utilized to reduce the radiation dose to as low as reasonably achievable. COMPARISON: Abdomen and pelvis CTs dating to 12/11/2014 HISTORY: ORDERING SYSTEM PROVIDED HISTORY: Abdominal pain TECHNOLOGIST PROVIDED HISTORY: Reason for exam:->Abdominal pain Reason for Exam: abdominal pain Acuity: Acute Type of Exam: Initial Additional signs and symptoms: c/o generalized abd pain/ weakness Relevant Medical/Surgical History: hx bladder surg, colon surg, prostate surg FINDINGS: LOWER CHEST:  Subpleural reticulations in each lung base, potentially atelectasis and/or scarring. Normal heart size. Mild concentric left ventricular hypertrophy. No pleural nor pericardial effusions. ORGANS:  3.6 cm x 2.8 cm heterogeneous but predominantly hypodense lesion in hepatic segment 4 near the fissure for ligamentum teres and jordon hepatis, possibly containing crossing vessels and more prominent compared prior studies. Mild to moderate pancreatic atrophy with some fatty replacement. Mildly atrophic spleen containing granulomatous calcifications. Multifocal right renal cortical scarring. Borderline left renal atrophy with asymmetrically decreased left renal enhancement. Persistent urothelial thickening and enhancement in the left renal collecting system and left ureter. Persistent moderate left hydronephrosis and mild left hydroureter with abrupt change normal caliber of the left ureter at the pelvic brim. COMPARISON: 02/11/2018 HISTORY: ORDERING SYSTEM PROVIDED HISTORY: weakness TECHNOLOGIST PROVIDED HISTORY: Reason for exam:->weakness Reason for Exam: weakness Acuity: Acute Type of Exam: Initial Relevant Medical/Surgical History: prostate ca FINDINGS: Orthopedic hardware in the cervical spine. No focal consolidation, pleural effusion or pneumothorax. The cardiomediastinal silhouette is stable. No overt pulmonary edema. The osseous structures are stable. No acute cardiopulmonary findings. Assessment & Plan:      Chemo Marcano is a 78y.o. year old male admitted 2/1/3564 for complicated UTI. PT known to Dr. Aggie Carolina, last seen in office 8/52/08.      1) Complicated UTI              5/4/20 UA dip pos nit, mod leuks, small blood              Urine culture positive, pending sensitivities              5/5/20 WBC 6.9, afebrile              On IV Cipro  2) Left Hydronephrosis: Secondary to ureteral stricture              5/4/20 CT a/p: Persistent moderate left hydronephrosis and mild left hydroureter likely with superimposed chronic left pyelitis and ureteritis. Persistent abrupt caliber change of the left ureter at the pelvic brim suggested stricture, potentially related to prior radiotherapy or infection. New borderline left renal atrophy is likely a long-term sequela of the hydroureteronephrosis. Possible mild cystitis. Decreased left renal enhancement likely due to underlying dysfunction. Cr 1.6, baseline   JUNIOR pending  3) Prostate Cancer              S/p RPP 1996 4/2020 PSA 1.36, stable              On Eligard  4) Suprapubic Catheter              Placed in 2013 after AUS explanted 5/28/13 (eroded)              Last exchanged 2 weeks ago              Currently draining well              Exchange SP tube today due to UTI development    Pt stable from a  standpoint. Will sign off for now, please call with any questions. Pt to be d/c on appropriate oral abx.  We will follow up with him in our office for SP tube exchange in 1 month. Patient seen and examined, chart reviewed.      Electronically signed by Naldo Acosta PA-C on 5/6/2020 at 8:40 AM

## 2020-05-07 ENCOUNTER — APPOINTMENT (OUTPATIENT)
Dept: MRI IMAGING | Age: 80
DRG: 699 | End: 2020-05-07
Payer: MEDICARE

## 2020-05-07 VITALS
BODY MASS INDEX: 31.31 KG/M2 | TEMPERATURE: 98.5 F | SYSTOLIC BLOOD PRESSURE: 180 MMHG | HEART RATE: 72 BPM | RESPIRATION RATE: 17 BRPM | DIASTOLIC BLOOD PRESSURE: 81 MMHG | WEIGHT: 206.6 LBS | HEIGHT: 68 IN | OXYGEN SATURATION: 97 %

## 2020-05-07 LAB
ANION GAP SERPL CALCULATED.3IONS-SCNC: 11 MMOL/L (ref 4–16)
BUN BLDV-MCNC: 20 MG/DL (ref 6–23)
CALCIUM SERPL-MCNC: 8.4 MG/DL (ref 8.3–10.6)
CHLORIDE BLD-SCNC: 104 MMOL/L (ref 99–110)
CO2: 22 MMOL/L (ref 21–32)
CREAT SERPL-MCNC: 1.5 MG/DL (ref 0.9–1.3)
GFR AFRICAN AMERICAN: 54 ML/MIN/1.73M2
GFR NON-AFRICAN AMERICAN: 45 ML/MIN/1.73M2
GLUCOSE BLD-MCNC: 144 MG/DL (ref 70–99)
GLUCOSE BLD-MCNC: 161 MG/DL (ref 70–99)
GLUCOSE BLD-MCNC: 335 MG/DL (ref 70–99)
POTASSIUM SERPL-SCNC: 4.3 MMOL/L (ref 3.5–5.1)
SODIUM BLD-SCNC: 137 MMOL/L (ref 135–145)

## 2020-05-07 PROCEDURE — 97535 SELF CARE MNGMENT TRAINING: CPT

## 2020-05-07 PROCEDURE — 97116 GAIT TRAINING THERAPY: CPT

## 2020-05-07 PROCEDURE — 6370000000 HC RX 637 (ALT 250 FOR IP): Performed by: NURSE PRACTITIONER

## 2020-05-07 PROCEDURE — 36415 COLL VENOUS BLD VENIPUNCTURE: CPT

## 2020-05-07 PROCEDURE — 97530 THERAPEUTIC ACTIVITIES: CPT

## 2020-05-07 PROCEDURE — 80048 BASIC METABOLIC PNL TOTAL CA: CPT

## 2020-05-07 PROCEDURE — 74183 MRI ABD W/O CNTR FLWD CNTR: CPT

## 2020-05-07 PROCEDURE — 97162 PT EVAL MOD COMPLEX 30 MIN: CPT

## 2020-05-07 PROCEDURE — 82962 GLUCOSE BLOOD TEST: CPT

## 2020-05-07 PROCEDURE — 2580000003 HC RX 258: Performed by: NURSE PRACTITIONER

## 2020-05-07 PROCEDURE — 94761 N-INVAS EAR/PLS OXIMETRY MLT: CPT

## 2020-05-07 PROCEDURE — 6360000004 HC RX CONTRAST MEDICATION: Performed by: INTERNAL MEDICINE

## 2020-05-07 PROCEDURE — A9577 INJ MULTIHANCE: HCPCS | Performed by: INTERNAL MEDICINE

## 2020-05-07 PROCEDURE — 6360000002 HC RX W HCPCS: Performed by: INTERNAL MEDICINE

## 2020-05-07 RX ORDER — AMLODIPINE BESYLATE 5 MG/1
5 TABLET ORAL DAILY
Qty: 30 TABLET | Refills: 0 | Status: ON HOLD | OUTPATIENT
Start: 2020-05-07 | End: 2021-11-26 | Stop reason: HOSPADM

## 2020-05-07 RX ORDER — LISINOPRIL 5 MG/1
5 TABLET ORAL DAILY
Qty: 30 TABLET | Refills: 2 | Status: ON HOLD | OUTPATIENT
Start: 2020-05-07 | End: 2022-04-14 | Stop reason: HOSPADM

## 2020-05-07 RX ORDER — CIPROFLOXACIN 500 MG/1
500 TABLET, FILM COATED ORAL 2 TIMES DAILY
Qty: 14 TABLET | Refills: 0 | Status: SHIPPED | OUTPATIENT
Start: 2020-05-07 | End: 2020-05-14

## 2020-05-07 RX ADMIN — SODIUM CHLORIDE: 9 INJECTION, SOLUTION INTRAVENOUS at 02:55

## 2020-05-07 RX ADMIN — GADOBENATE DIMEGLUMINE 16 ML: 529 INJECTION, SOLUTION INTRAVENOUS at 10:46

## 2020-05-07 RX ADMIN — ACETAMINOPHEN 650 MG: 325 TABLET ORAL at 02:55

## 2020-05-07 RX ADMIN — INSULIN DETEMIR 40 UNITS: 100 INJECTION, SOLUTION SUBCUTANEOUS at 09:43

## 2020-05-07 RX ADMIN — LABETALOL HYDROCHLORIDE 300 MG: 200 TABLET, FILM COATED ORAL at 09:43

## 2020-05-07 RX ADMIN — CIPROFLOXACIN 400 MG: 2 INJECTION, SOLUTION INTRAVENOUS at 11:11

## 2020-05-07 ASSESSMENT — PAIN SCALES - GENERAL
PAINLEVEL_OUTOF10: 0
PAINLEVEL_OUTOF10: 5
PAINLEVEL_OUTOF10: 0

## 2020-05-07 NOTE — CARE COORDINATION
CM reviewed chart, noted d/c order.   CM notified [facility] notified of discharge via phone and faxed [facility] order and AVS. I have personally seen and examined this patient.  I have fully participated in the care of this patient. I have reviewed all pertinent clinical information, including history, physical exam, plan and the Resident’s note and agree except as noted.

## 2020-05-07 NOTE — PROGRESS NOTES
Occupational Therapy    Occupational Therapy Treatment Note  Name: Juan Burton MRN: [id number] :   1940   Date:  2020   Admission Date: 2020 Room:  [facility]21122-A   Restrictions/Precautions:    fall risk  Communication with other providers:   Cleared for treatment by Tessy Rosenberg RN. Subjective:  Patient states:  \"I would like to get washed up\"  Pain:   Location, Type, Intensity (0/10 to 10/10):  6/10 L side pt told Dr. Pedrito Biggs  Objective:    Observation: Pt alert and oriented. Treatment, including education:  Transfers  Supine to sit :SBA  Sit to supine :SBA  Scooting :SBA  Rolling :SBA  Sit to stand :CGA  Stand to sit :SBA  SPT:CGA    Self Care Training:   Cues were given for safety, sequence, UE/LE placement, visual cues, and balance. Activities performed today included dressing, toileting, hand hygiene, and grooming. Grooming  Mod I to brush teeth, hair and shave at EOB. Bathing   Min A for B feet. UB Dress  Donned gown    LB Dress  Max A required assistance to don B socks (reports using sock aide at home) and threading B feet into brief and was able to pull up brief. Therapeutic Activity Training:   Therapeutic activity training was instructed today. Cues were given for safety, sequence, UE/LE placement, awareness, and balance. Activities performed today included bed mobility training, sup-sit, sit-stand, SPT. Safety Measures: Gait belt used, up in chair, Pull/Bed Alarm activated and call light left in reach          Assessment / Impression:        Patient's tolerance of treatment: Good   Adverse Reaction: None  Significant change in status and impact:  None  Barriers to improvement:  None    Plan for Next Session:    Continue with POC.     Time in:  08  Time out:  30  Timed treatment minutes:  55  Total treatment time:  54  Electronically signed by:    Chasity [address]    2020, 9:33 AM    Previously filed values:

## 2020-05-07 NOTE — PROGRESS NOTES
Physical Therapy    Facility/Department: [facility]N  Initial Assessment    NAME: Symone Singh  : 1940  MRN: [id number]    Date of Service: 2020    Discharge Recommendations:  24 hour supervision or assist, Home with Home health PT, S Level 1       Functional Outcome Measure:    Acute Care Index of Function (ACIF):    Score: 0.82 (0.71 or greater = appropriate for home with home PT and 24 hour supervision/assist)      Assessment   Assessment: Pt is an 78 y.o. male with medical history, surgical history, co-morbidities, and personal factors including Acute urinary tract infection, Ataxia, Diabetes mellitus, Fusion of spine of cervical region, Gait disturbance, History of prostate cancer, History of tobacco use, Hyperlipidemia, Osteoarthritis, Prostate surgery; other surgical history (3/28/13); Colon surgery; Bladder surgery; and Prostatectomy () with admission for Urinary tract infection without hematuria, Elevated troponin, Hyperglycemia, and Other fatigue. Prior to admission, pt was independent with functional mobility and ADLs. Examination of body systems reveals decreased endurance which limits his overall functional mobility. Prognosis: Good  Decision Making: Medium Complexity  Clinical Presentation: evolving  PT Education: Transfer Training;Goals; Equipment;PT Role;Functional Mobility Training;Plan of Care;General Safety;Gait Training  REQUIRES PT FOLLOW UP: Yes  Activity Tolerance  Activity Tolerance: Patient Tolerated treatment well         Restrictions  Restrictions/Precautions  Restrictions/Precautions: General Precautions  Vision/Hearing  Vision: Within Functional Limits  Hearing: Within functional limits     Subjective  General  Chart Reviewed: Yes  Patient assessed for rehabilitation services?: Yes  Family / Caregiver Present: No  Follows Commands: Within Functional Limits  Pain Screening  Patient Currently in Pain: Denies  Vital Signs  Patient Currently in Pain: Denies

## 2020-05-08 LAB
EKG ATRIAL RATE: 67 BPM
EKG ATRIAL RATE: 68 BPM
EKG DIAGNOSIS: NORMAL
EKG DIAGNOSIS: NORMAL
EKG P AXIS: 67 DEGREES
EKG P AXIS: 68 DEGREES
EKG P-R INTERVAL: 226 MS
EKG P-R INTERVAL: 240 MS
EKG Q-T INTERVAL: 394 MS
EKG Q-T INTERVAL: 410 MS
EKG QRS DURATION: 76 MS
EKG QRS DURATION: 78 MS
EKG QTC CALCULATION (BAZETT): 418 MS
EKG QTC CALCULATION (BAZETT): 433 MS
EKG R AXIS: -6 DEGREES
EKG R AXIS: 2 DEGREES
EKG T AXIS: -13 DEGREES
EKG T AXIS: 2 DEGREES
EKG VENTRICULAR RATE: 67 BPM
EKG VENTRICULAR RATE: 68 BPM

## 2020-05-08 PROCEDURE — 93010 ELECTROCARDIOGRAM REPORT: CPT | Performed by: INTERNAL MEDICINE

## 2020-06-04 PROBLEM — N39.0 UTI (URINARY TRACT INFECTION): Status: RESOLVED | Noted: 2020-05-05 | Resolved: 2020-06-04

## 2020-06-18 ENCOUNTER — HOSPITAL ENCOUNTER (EMERGENCY)
Age: 80
Discharge: HOME | End: 2020-06-18
Attending: EMERGENCY MEDICINE
Payer: MEDICARE

## 2020-06-18 ENCOUNTER — APPOINTMENT (OUTPATIENT)
Dept: CT IMAGING | Age: 80
End: 2020-06-18
Payer: MEDICARE

## 2020-06-18 ENCOUNTER — APPOINTMENT (OUTPATIENT)
Dept: GENERAL RADIOLOGY | Age: 80
End: 2020-06-18
Payer: MEDICARE

## 2020-06-18 ENCOUNTER — HOSPITAL ENCOUNTER (EMERGENCY)
Age: 80
Discharge: HOME | End: 2020-06-19
Attending: EMERGENCY MEDICINE
Payer: MEDICARE

## 2020-06-18 VITALS
OXYGEN SATURATION: 98 % | TEMPERATURE: 98.7 F | HEART RATE: 69 BPM | SYSTOLIC BLOOD PRESSURE: 153 MMHG | HEIGHT: 68 IN | RESPIRATION RATE: 16 BRPM | DIASTOLIC BLOOD PRESSURE: 65 MMHG | WEIGHT: 194 LBS | BODY MASS INDEX: 29.4 KG/M2

## 2020-06-18 VITALS
RESPIRATION RATE: 17 BRPM | HEIGHT: 68 IN | SYSTOLIC BLOOD PRESSURE: 115 MMHG | WEIGHT: 194 LBS | HEART RATE: 69 BPM | TEMPERATURE: 98.2 F | BODY MASS INDEX: 29.4 KG/M2 | DIASTOLIC BLOOD PRESSURE: 98 MMHG | OXYGEN SATURATION: 97 %

## 2020-06-18 LAB
ALBUMIN SERPL-MCNC: 3.3 GM/DL (ref 3.4–5)
ALP BLD-CCNC: 98 IU/L (ref 40–129)
ALT SERPL-CCNC: 8 U/L (ref 10–40)
ANION GAP SERPL CALCULATED.3IONS-SCNC: 10 MMOL/L (ref 4–16)
AST SERPL-CCNC: 13 IU/L (ref 15–37)
BACTERIA: ABNORMAL /HPF
BASOPHILS ABSOLUTE: 0.1 K/CU MM
BASOPHILS RELATIVE PERCENT: 0.6 % (ref 0–1)
BILIRUB SERPL-MCNC: 0.2 MG/DL (ref 0–1)
BILIRUBIN URINE: NEGATIVE MG/DL
BLOOD, URINE: ABNORMAL
BUN BLDV-MCNC: 28 MG/DL (ref 6–23)
CALCIUM SERPL-MCNC: 8.8 MG/DL (ref 8.3–10.6)
CHLORIDE BLD-SCNC: 101 MMOL/L (ref 99–110)
CLARITY: ABNORMAL
CO2: 22 MMOL/L (ref 21–32)
COLOR: YELLOW
CREAT SERPL-MCNC: 1.9 MG/DL (ref 0.9–1.3)
DIFFERENTIAL TYPE: ABNORMAL
EOSINOPHILS ABSOLUTE: 0.4 K/CU MM
EOSINOPHILS RELATIVE PERCENT: 4.9 % (ref 0–3)
GFR AFRICAN AMERICAN: 41 ML/MIN/1.73M2
GFR NON-AFRICAN AMERICAN: 34 ML/MIN/1.73M2
GLUCOSE BLD-MCNC: 368 MG/DL (ref 70–99)
GLUCOSE, URINE: >500 MG/DL
HCT VFR BLD CALC: 31.1 % (ref 42–52)
HEMOGLOBIN: 9.6 GM/DL (ref 13.5–18)
IMMATURE NEUTROPHIL %: 0.2 % (ref 0–0.43)
KETONES, URINE: NEGATIVE MG/DL
LACTATE: 1.7 MMOL/L (ref 0.4–2)
LEUKOCYTE ESTERASE, URINE: ABNORMAL
LIPASE: 61 IU/L (ref 13–60)
LYMPHOCYTES ABSOLUTE: 1.2 K/CU MM
LYMPHOCYTES RELATIVE PERCENT: 15.3 % (ref 24–44)
MCH RBC QN AUTO: 30.7 PG (ref 27–31)
MCHC RBC AUTO-ENTMCNC: 30.9 % (ref 32–36)
MCV RBC AUTO: 99.4 FL (ref 78–100)
MONOCYTES ABSOLUTE: 0.7 K/CU MM
MONOCYTES RELATIVE PERCENT: 9.1 % (ref 0–4)
MUCUS: ABNORMAL HPF
NITRITE URINE, QUANTITATIVE: POSITIVE
NUCLEATED RBC %: 0 %
PDW BLD-RTO: 13.9 % (ref 11.7–14.9)
PH, URINE: 5 (ref 5–8)
PLATELET # BLD: 211 K/CU MM (ref 140–440)
PMV BLD AUTO: 11.5 FL (ref 7.5–11.1)
POTASSIUM SERPL-SCNC: 4.6 MMOL/L (ref 3.5–5.1)
PROTEIN UA: 100 MG/DL
RBC # BLD: 3.13 M/CU MM (ref 4.6–6.2)
RBC URINE: 86 /HPF (ref 0–3)
SEGMENTED NEUTROPHILS ABSOLUTE COUNT: 5.7 K/CU MM
SEGMENTED NEUTROPHILS RELATIVE PERCENT: 69.9 % (ref 36–66)
SODIUM BLD-SCNC: 133 MMOL/L (ref 135–145)
SPECIFIC GRAVITY UA: 1.02 (ref 1–1.03)
TOTAL IMMATURE NEUTOROPHIL: 0.02 K/CU MM
TOTAL NUCLEATED RBC: 0 K/CU MM
TOTAL PROTEIN: 6.9 GM/DL (ref 6.4–8.2)
TRICHOMONAS: ABNORMAL /HPF
TROPONIN T: 0.01 NG/ML
TROPONIN T: 0.01 NG/ML
UROBILINOGEN, URINE: NORMAL MG/DL (ref 0.2–1)
WBC # BLD: 8.1 K/CU MM (ref 4–10.5)
WBC CLUMP: ABNORMAL /HPF
WBC UA: 598 /HPF (ref 0–2)

## 2020-06-18 PROCEDURE — 84484 ASSAY OF TROPONIN QUANT: CPT

## 2020-06-18 PROCEDURE — 96375 TX/PRO/DX INJ NEW DRUG ADDON: CPT

## 2020-06-18 PROCEDURE — 2580000003 HC RX 258: Performed by: PHYSICIAN ASSISTANT

## 2020-06-18 PROCEDURE — 99284 EMERGENCY DEPT VISIT MOD MDM: CPT

## 2020-06-18 PROCEDURE — 81001 URINALYSIS AUTO W/SCOPE: CPT

## 2020-06-18 PROCEDURE — 93005 ELECTROCARDIOGRAM TRACING: CPT | Performed by: EMERGENCY MEDICINE

## 2020-06-18 PROCEDURE — 71045 X-RAY EXAM CHEST 1 VIEW: CPT

## 2020-06-18 PROCEDURE — 85025 COMPLETE CBC W/AUTO DIFF WBC: CPT

## 2020-06-18 PROCEDURE — 83605 ASSAY OF LACTIC ACID: CPT

## 2020-06-18 PROCEDURE — 6360000002 HC RX W HCPCS: Performed by: PHYSICIAN ASSISTANT

## 2020-06-18 PROCEDURE — 74176 CT ABD & PELVIS W/O CONTRAST: CPT

## 2020-06-18 PROCEDURE — 73590 X-RAY EXAM OF LOWER LEG: CPT

## 2020-06-18 PROCEDURE — 72125 CT NECK SPINE W/O DYE: CPT

## 2020-06-18 PROCEDURE — 80053 COMPREHEN METABOLIC PANEL: CPT

## 2020-06-18 PROCEDURE — 70450 CT HEAD/BRAIN W/O DYE: CPT

## 2020-06-18 PROCEDURE — 83690 ASSAY OF LIPASE: CPT

## 2020-06-18 PROCEDURE — 6370000000 HC RX 637 (ALT 250 FOR IP): Performed by: PHYSICIAN ASSISTANT

## 2020-06-18 PROCEDURE — 93010 ELECTROCARDIOGRAM REPORT: CPT | Performed by: INTERNAL MEDICINE

## 2020-06-18 PROCEDURE — 96365 THER/PROPH/DIAG IV INF INIT: CPT

## 2020-06-18 PROCEDURE — 96376 TX/PRO/DX INJ SAME DRUG ADON: CPT

## 2020-06-18 RX ORDER — OXYBUTYNIN CHLORIDE 5 MG/1
5 TABLET ORAL ONCE
Status: COMPLETED | OUTPATIENT
Start: 2020-06-18 | End: 2020-06-18

## 2020-06-18 RX ORDER — MORPHINE SULFATE 4 MG/ML
2 INJECTION, SOLUTION INTRAMUSCULAR; INTRAVENOUS EVERY 30 MIN PRN
Status: DISCONTINUED | OUTPATIENT
Start: 2020-06-18 | End: 2020-06-18 | Stop reason: HOSPADM

## 2020-06-18 RX ORDER — 0.9 % SODIUM CHLORIDE 0.9 %
500 INTRAVENOUS SOLUTION INTRAVENOUS ONCE
Status: COMPLETED | OUTPATIENT
Start: 2020-06-18 | End: 2020-06-18

## 2020-06-18 RX ORDER — CEPHALEXIN 500 MG/1
500 CAPSULE ORAL 2 TIMES DAILY
Qty: 14 CAPSULE | Refills: 0 | Status: SHIPPED | OUTPATIENT
Start: 2020-06-18 | End: 2020-06-25

## 2020-06-18 RX ORDER — OXYBUTYNIN CHLORIDE 5 MG/1
5 TABLET, EXTENDED RELEASE ORAL DAILY
Qty: 7 TABLET | Refills: 0 | Status: SHIPPED | OUTPATIENT
Start: 2020-06-18 | End: 2022-03-17

## 2020-06-18 RX ADMIN — MORPHINE SULFATE 2 MG: 4 INJECTION, SOLUTION INTRAMUSCULAR; INTRAVENOUS at 09:08

## 2020-06-18 RX ADMIN — OXYBUTYNIN CHLORIDE 5 MG: 5 TABLET ORAL at 12:36

## 2020-06-18 RX ADMIN — CEFTRIAXONE SODIUM 1 G: 1 INJECTION, POWDER, FOR SOLUTION INTRAMUSCULAR; INTRAVENOUS at 11:19

## 2020-06-18 RX ADMIN — MORPHINE SULFATE 2 MG: 4 INJECTION, SOLUTION INTRAMUSCULAR; INTRAVENOUS at 11:19

## 2020-06-18 RX ADMIN — SODIUM CHLORIDE 500 ML: 9 INJECTION, SOLUTION INTRAVENOUS at 11:15

## 2020-06-18 ASSESSMENT — PAIN DESCRIPTION - DESCRIPTORS
DESCRIPTORS: CRAMPING
DESCRIPTORS: CONSTANT

## 2020-06-18 ASSESSMENT — PAIN DESCRIPTION - FREQUENCY: FREQUENCY: INTERMITTENT

## 2020-06-18 ASSESSMENT — PAIN DESCRIPTION - PAIN TYPE
TYPE: ACUTE PAIN

## 2020-06-18 ASSESSMENT — PAIN SCALES - GENERAL
PAINLEVEL_OUTOF10: 10
PAINLEVEL_OUTOF10: 10
PAINLEVEL_OUTOF10: 8
PAINLEVEL_OUTOF10: 7
PAINLEVEL_OUTOF10: 7

## 2020-06-18 ASSESSMENT — PAIN DESCRIPTION - LOCATION
LOCATION: ABDOMEN

## 2020-06-18 ASSESSMENT — PAIN DESCRIPTION - ORIENTATION
ORIENTATION: LOWER

## 2020-06-18 NOTE — ED PROVIDER NOTES
cardiopulmonary disease. Osteopenia no acute process of the left tibia or fibula. XR CHEST PORTABLE   Final Result   No acute cardiopulmonary disease. Osteopenia no acute process of the left tibia or fibula.                 Labs  Results for orders placed or performed during the hospital encounter of 06/18/20   CBC Auto Differential   Result Value Ref Range    WBC 8.1 4.0 - 10.5 K/CU MM    RBC 3.13 (L) 4.6 - 6.2 M/CU MM    Hemoglobin 9.6 (L) 13.5 - 18.0 GM/DL    Hematocrit 31.1 (L) 42 - 52 %    MCV 99.4 78 - 100 FL    MCH 30.7 27 - 31 PG    MCHC 30.9 (L) 32.0 - 36.0 %    RDW 13.9 11.7 - 14.9 %    Platelets 060 620 - 453 K/CU MM    MPV 11.5 (H) 7.5 - 11.1 FL    Differential Type AUTOMATED DIFFERENTIAL     Segs Relative 69.9 (H) 36 - 66 %    Lymphocytes % 15.3 (L) 24 - 44 %    Monocytes % 9.1 (H) 0 - 4 %    Eosinophils % 4.9 (H) 0 - 3 %    Basophils % 0.6 0 - 1 %    Segs Absolute 5.7 K/CU MM    Lymphocytes Absolute 1.2 K/CU MM    Monocytes Absolute 0.7 K/CU MM    Eosinophils Absolute 0.4 K/CU MM    Basophils Absolute 0.1 K/CU MM    Nucleated RBC % 0.0 %    Total Nucleated RBC 0.0 K/CU MM    Total Immature Neutrophil 0.02 K/CU MM    Immature Neutrophil % 0.2 0 - 0.43 %   CMP   Result Value Ref Range    Sodium 133 (L) 135 - 145 MMOL/L    Potassium 4.6 3.5 - 5.1 MMOL/L    Chloride 101 99 - 110 mMol/L    CO2 22 21 - 32 MMOL/L    BUN 28 (H) 6 - 23 MG/DL    CREATININE 1.9 (H) 0.9 - 1.3 MG/DL    Glucose 368 (H) 70 - 99 MG/DL    Calcium 8.8 8.3 - 10.6 MG/DL    Alb 3.3 (L) 3.4 - 5.0 GM/DL    Total Protein 6.9 6.4 - 8.2 GM/DL    Total Bilirubin 0.2 0.0 - 1.0 MG/DL    ALT 8 (L) 10 - 40 U/L    AST 13 (L) 15 - 37 IU/L    Alkaline Phosphatase 98 40 - 129 IU/L    GFR Non- 34 (L) >60 mL/min/1.73m2    GFR  41 (L) >60 mL/min/1.73m2    Anion Gap 10 4 - 16   Lipase   Result Value Ref Range    Lipase 61 (H) 13 - 60 IU/L   Lactic Acid, Plasma   Result Value Ref Range    Lactate 1.7 0.4 - 2.0 mMOL/L   Urinalysis   Result Value Ref Range    Color, UA YELLOW YELLOW    Clarity, UA CLOUDY (A) CLEAR    Glucose, Urine >500 (A) NEGATIVE MG/DL    Bilirubin Urine NEGATIVE NEGATIVE MG/DL    Ketones, Urine NEGATIVE NEGATIVE MG/DL    Specific Gravity, UA 1.017 1.001 - 1.035    Blood, Urine LARGE (A) NEGATIVE    pH, Urine 5.0 5.0 - 8.0    Protein,  (A) NEGATIVE MG/DL    Urobilinogen, Urine NORMAL 0.2 - 1.0 MG/DL    Nitrite Urine, Quantitative POSITIVE (A) NEGATIVE    Leukocyte Esterase, Urine LARGE (A) NEGATIVE    RBC, UA 86 (H) 0 - 3 /HPF    WBC,  (H) 0 - 2 /HPF    Bacteria, UA MODERATE (A) NEGATIVE /HPF    WBC Clumps, UA FEW /HPF    Mucus, UA RARE (A) NEGATIVE HPF    Trichomonas, UA NONE SEEN NONE SEEN /HPF   Troponin   Result Value Ref Range    Troponin T 0.013 (H) <0.01 NG/ML   Troponin   Result Value Ref Range    Troponin T 0.012 (H) <0.01 NG/ML   EKG 12 Lead   Result Value Ref Range    Ventricular Rate 67 BPM    Atrial Rate 67 BPM    P-R Interval 226 ms    QRS Duration 74 ms    Q-T Interval 396 ms    QTc Calculation (Bazett) 418 ms    P Axis 78 degrees    R Axis 5 degrees    T Axis 5 degrees    Diagnosis       Sinus rhythm with 1st degree AV block  Otherwise normal ECG  When compared with ECG of 05-MAY-2020 15:40,  No significant change was found  Confirmed by [facility] MD, Rheba Drain ([id number]) on 6/18/2020 1:33:58 PM           MDM  Patient presents for abdominal pain, \"spasms\", some blood in urine, concern for constipation, decreased urine output. Patient's abdomen is fairly benign on exam but given his age we did send him for CT abdomen with contrast, IV did extravasate, CT without contrast reveals stable left-sided hydronephrosis, no evidence for constipation. Laboratory work-up reveals slightly elevated troponin which appears to be chronic from patient's chronic kidney disease. Delta troponin is the same.   Urine does appear infected, patient reports his \"spasms\" are located over the bladder, these may Wheelchair/Stroller

## 2020-06-18 NOTE — ED NOTES
Discussed discharge instructions with patient. All questions answered. Patient verbalized understanding.           Claudean Banter, RN  06/18/20 [id number]

## 2020-06-19 LAB
ALBUMIN SERPL-MCNC: 3.3 GM/DL (ref 3.4–5)
ALP BLD-CCNC: 97 IU/L (ref 40–129)
ALT SERPL-CCNC: 8 U/L (ref 10–40)
ANION GAP SERPL CALCULATED.3IONS-SCNC: 13 MMOL/L (ref 4–16)
AST SERPL-CCNC: 13 IU/L (ref 15–37)
BASOPHILS ABSOLUTE: 0.1 K/CU MM
BASOPHILS RELATIVE PERCENT: 0.9 % (ref 0–1)
BILIRUB SERPL-MCNC: 0.2 MG/DL (ref 0–1)
BUN BLDV-MCNC: 26 MG/DL (ref 6–23)
CALCIUM SERPL-MCNC: 8.9 MG/DL (ref 8.3–10.6)
CHLORIDE BLD-SCNC: 98 MMOL/L (ref 99–110)
CO2: 18 MMOL/L (ref 21–32)
CREAT SERPL-MCNC: 1.8 MG/DL (ref 0.9–1.3)
DIFFERENTIAL TYPE: ABNORMAL
EOSINOPHILS ABSOLUTE: 0.3 K/CU MM
EOSINOPHILS RELATIVE PERCENT: 4.9 % (ref 0–3)
GFR AFRICAN AMERICAN: 44 ML/MIN/1.73M2
GFR NON-AFRICAN AMERICAN: 36 ML/MIN/1.73M2
GLUCOSE BLD-MCNC: 395 MG/DL (ref 70–99)
HCT VFR BLD CALC: 32.5 % (ref 42–52)
HEMOGLOBIN: 9.5 GM/DL (ref 13.5–18)
IMMATURE NEUTROPHIL %: 0.1 % (ref 0–0.43)
LACTATE: 1.7 MMOL/L (ref 0.4–2)
LYMPHOCYTES ABSOLUTE: 1.2 K/CU MM
LYMPHOCYTES RELATIVE PERCENT: 18.5 % (ref 24–44)
MCH RBC QN AUTO: 30.6 PG (ref 27–31)
MCHC RBC AUTO-ENTMCNC: 29.2 % (ref 32–36)
MCV RBC AUTO: 104.8 FL (ref 78–100)
MONOCYTES ABSOLUTE: 0.6 K/CU MM
MONOCYTES RELATIVE PERCENT: 8.8 % (ref 0–4)
NUCLEATED RBC %: 0 %
PDW BLD-RTO: 14 % (ref 11.7–14.9)
PLATELET # BLD: 210 K/CU MM (ref 140–440)
PMV BLD AUTO: 12.2 FL (ref 7.5–11.1)
POTASSIUM SERPL-SCNC: 4.6 MMOL/L (ref 3.5–5.1)
RBC # BLD: 3.1 M/CU MM (ref 4.6–6.2)
SEGMENTED NEUTROPHILS ABSOLUTE COUNT: 4.5 K/CU MM
SEGMENTED NEUTROPHILS RELATIVE PERCENT: 66.8 % (ref 36–66)
SODIUM BLD-SCNC: 129 MMOL/L (ref 135–145)
TOTAL IMMATURE NEUTOROPHIL: 0.01 K/CU MM
TOTAL NUCLEATED RBC: 0 K/CU MM
TOTAL PROTEIN: 6.8 GM/DL (ref 6.4–8.2)
WBC # BLD: 6.7 K/CU MM (ref 4–10.5)

## 2020-06-19 PROCEDURE — 83605 ASSAY OF LACTIC ACID: CPT

## 2020-06-19 PROCEDURE — 6370000000 HC RX 637 (ALT 250 FOR IP): Performed by: EMERGENCY MEDICINE

## 2020-06-19 PROCEDURE — 80053 COMPREHEN METABOLIC PANEL: CPT

## 2020-06-19 PROCEDURE — 85025 COMPLETE CBC W/AUTO DIFF WBC: CPT

## 2020-06-19 RX ORDER — HYDROCODONE BITARTRATE AND ACETAMINOPHEN 5; 325 MG/1; MG/1
1 TABLET ORAL ONCE
Status: COMPLETED | OUTPATIENT
Start: 2020-06-19 | End: 2020-06-19

## 2020-06-19 RX ORDER — HYDROCODONE BITARTRATE AND ACETAMINOPHEN 5; 325 MG/1; MG/1
1 TABLET ORAL EVERY 6 HOURS PRN
Qty: 6 TABLET | Refills: 0 | Status: SHIPPED | OUTPATIENT
Start: 2020-06-19 | End: 2020-06-22

## 2020-06-19 RX ORDER — ACETAMINOPHEN 325 MG/1
650 TABLET ORAL ONCE
Status: COMPLETED | OUTPATIENT
Start: 2020-06-19 | End: 2020-06-19

## 2020-06-19 RX ORDER — OXYBUTYNIN CHLORIDE 5 MG/1
5 TABLET ORAL ONCE
Status: COMPLETED | OUTPATIENT
Start: 2020-06-19 | End: 2020-06-19

## 2020-06-19 RX ADMIN — OXYBUTYNIN CHLORIDE 5 MG: 5 TABLET ORAL at 00:43

## 2020-06-19 RX ADMIN — ACETAMINOPHEN 650 MG: 325 TABLET ORAL at 04:18

## 2020-06-19 RX ADMIN — HYDROCODONE BITARTRATE AND ACETAMINOPHEN 1 TABLET: 5; 325 TABLET ORAL at 02:23

## 2020-06-19 ASSESSMENT — PAIN SCALES - GENERAL
PAINLEVEL_OUTOF10: 7
PAINLEVEL_OUTOF10: 8

## 2020-06-19 NOTE — ED PROVIDER NOTES
Emergency Department Encounter  Location: [address] [place] [address] EMERGENCY DEPARTMENT    Patient: Stephane Brady  MRN: [id number]  : 1940  Date of evaluation: 2020  ED Provider: Marta Lam DO    Chief Complaint:    Abdominal Pain    [place]:  Stephane Brady is a 78 y.o. male that presents to the emergency department concern for pressure in his lower abdomen. He states he is not been able to have a bowel movement for the past week and is concerned that he is constipated. He describes intermittent spasms of pain, particularly when he changes position. He indicates he is had a good appetite and denies nausea or vomiting. No history of constipation. Does have a suprapubic catheter. Was seen here earlier in the emergency department. At that time he was diagnosed with a urinary tract infection and thought to be having bladder spasms. He did try taking 1 of the prescribed Ditropan several hours ago with very relief. ROS:  At least 10 systems reviewed and are acutely negative unless otherwise noted in the HPI. Past Medical History:   Diagnosis Date    Acute urinary tract infection 3/15/2012    Ataxia 2010    Diabetes mellitus ([facility] [address]     type 2, controlled    Fusion of spine of cervical region 10/2012    Gait disturbance     History of prostate cancer     adenocarcinoma    History of tobacco use     Hyperlipidemia     Osteoarthritis      Past Surgical History:   Procedure Laterality Date    BLADDER SURGERY      COLON SURGERY      OTHER SURGICAL HISTORY  3/28/13    Cysto with removal of artificial sphinter and placement of suprapubic catheter.     PROSTATE SURGERY      PROSTATECTOMY      infection     Family History   Problem Relation Age of Onset    Cancer Mother     Diabetes Father     Heart Disease Father     High Blood Pressure Father     Diabetes Sister     High Blood Pressure Sister     Cancer Brother         prostate, MCH 30.6 27 - 31 PG    MCHC 29.2 (L) 32.0 - 36.0 %    RDW 14.0 11.7 - 14.9 %    Platelets 629 001 - 799 K/CU MM    MPV 12.2 (H) 7.5 - 11.1 FL    Differential Type AUTOMATED DIFFERENTIAL     Segs Relative 66.8 (H) 36 - 66 %    Lymphocytes % 18.5 (L) 24 - 44 %    Monocytes % 8.8 (H) 0 - 4 %    Eosinophils % 4.9 (H) 0 - 3 %    Basophils % 0.9 0 - 1 %    Segs Absolute 4.5 K/CU MM    Lymphocytes Absolute 1.2 K/CU MM    Monocytes Absolute 0.6 K/CU MM    Eosinophils Absolute 0.3 K/CU MM    Basophils Absolute 0.1 K/CU MM    Nucleated RBC % 0.0 %    Total Nucleated RBC 0.0 K/CU MM    Total Immature Neutrophil 0.01 K/CU MM    Immature Neutrophil % 0.1 0 - 0.43 %   Comprehensive Metabolic Panel w/ Reflex to MG   Result Value Ref Range    Sodium 129 (L) 135 - 145 MMOL/L    Potassium 4.6 3.5 - 5.1 MMOL/L    Chloride 98 (L) 99 - 110 mMol/L    CO2 18 (L) 21 - 32 MMOL/L    BUN 26 (H) 6 - 23 MG/DL    CREATININE 1.8 (H) 0.9 - 1.3 MG/DL    Glucose 395 (H) 70 - 99 MG/DL    Calcium 8.9 8.3 - 10.6 MG/DL    Alb 3.3 (L) 3.4 - 5.0 GM/DL    Total Protein 6.8 6.4 - 8.2 GM/DL    Total Bilirubin 0.2 0.0 - 1.0 MG/DL    ALT 8 (L) 10 - 40 U/L    AST 13 (L) 15 - 37 IU/L    Alkaline Phosphatase 97 40 - 129 IU/L    GFR Non- 36 (L) >60 mL/min/1.73m2    GFR  44 (L) >60 mL/min/1.73m2    Anion Gap 13 4 - 16   Lactic Acid, Plasma   Result Value Ref Range    Lactate 1.7 0.4 - 2.0 mMOL/L         Radiographs (if obtained):  [] The following radiograph was interpreted by myself in the absence of a radiologist:  [x] Radiologist's Report reviewed at time of ED visit:  Ct Abdomen Pelvis Wo Contrast Additional Contrast? None    Result Date: 6/18/2020  EXAMINATION: CT OF THE ABDOMEN AND PELVIS WITHOUT CONTRAST 6/18/2020 9:25 am TECHNIQUE: CT of the abdomen and pelvis was performed without the administration of intravenous contrast. Multiplanar reformatted images are provided for review.  Dose modulation, iterative reconstruction, modulation, iterative reconstruction, and/or weight based adjustment of the mA/kV was utilized to reduce the radiation dose to as low as reasonably achievable. COMPARISON: None. HISTORY: ORDERING SYSTEM PROVIDED HISTORY: fall TECHNOLOGIST PROVIDED HISTORY: Reason for exam:->fall Reason for Exam: trauma/ fall Acuity: Acute Type of Exam: Initial FINDINGS: BONES/ALIGNMENT: There is no acute fracture or traumatic malalignment. DEGENERATIVE CHANGES: Postsurgical changes extending from C3 through C7 with prior laminectomies performed at these levels. There is diffuse disc space narrowing seen within the cervical spine. There are anterior and posterior osteophyte seen extending off the cervical vertebrae. SOFT TISSUES: There is no prevertebral soft tissue swelling. Postsurgical changes and degenerative changes seen throughout the cervical spine with no acute abnormality noted. Xr Chest Portable    Result Date: 6/18/2020  EXAMINATION: ONE XRAY VIEW OF THE CHEST; TWO XRAY VIEWS OF THE LEFT TIBIA AND FIBULA 6/18/2020 8:11 am COMPARISON: Chest radiograph dated 05/04/2020 HISTORY: ORDERING SYSTEM PROVIDED HISTORY: LUQ pain TECHNOLOGIST PROVIDED HISTORY: Reason for exam:->LUQ pain Reason for Exam: LUQ pain Initial evaluation left lower extremity pain and chest pain. FINDINGS: Chest: The heart and mediastinal structures are stable. The pulmonary vasculature is normal.  Lungs are clear. The patient has undergone previous posterolateral stabilization of lower cervical spine not completely assessed on this exam. Left tib-fib: The bones are osteopenic. The left tibia and fibula are intact. The knee and ankle joints are maintained. No acute cardiopulmonary disease. Osteopenia no acute process of the left tibia or fibula. ED Course and MDM:  Results from earlier today are reviewed and are reassuring. Repeat labs this evening are essentially unchanged. Rectal exam performed as well.   No evidence of fecal

## 2020-06-23 LAB
EKG ATRIAL RATE: 67 BPM
EKG DIAGNOSIS: NORMAL
EKG P AXIS: 78 DEGREES
EKG P-R INTERVAL: 226 MS
EKG Q-T INTERVAL: 396 MS
EKG QRS DURATION: 74 MS
EKG QTC CALCULATION (BAZETT): 418 MS
EKG R AXIS: 5 DEGREES
EKG T AXIS: 5 DEGREES
EKG VENTRICULAR RATE: 67 BPM

## 2020-09-12 ENCOUNTER — HOSPITAL ENCOUNTER (EMERGENCY)
Age: 80
Discharge: HOME | End: 2020-09-12
Attending: EMERGENCY MEDICINE
Payer: MEDICARE

## 2020-09-12 ENCOUNTER — APPOINTMENT (OUTPATIENT)
Dept: CT IMAGING | Age: 80
End: 2020-09-12
Payer: MEDICARE

## 2020-09-12 VITALS
RESPIRATION RATE: 15 BRPM | TEMPERATURE: 98.3 F | OXYGEN SATURATION: 97 % | HEIGHT: 68 IN | DIASTOLIC BLOOD PRESSURE: 84 MMHG | WEIGHT: 194 LBS | SYSTOLIC BLOOD PRESSURE: 156 MMHG | HEART RATE: 73 BPM | BODY MASS INDEX: 29.4 KG/M2

## 2020-09-12 LAB
ANION GAP SERPL CALCULATED.3IONS-SCNC: 13 MMOL/L (ref 4–16)
BACTERIA: ABNORMAL /HPF
BASOPHILS ABSOLUTE: 0.1 K/CU MM
BASOPHILS RELATIVE PERCENT: 0.6 % (ref 0–1)
BILIRUBIN URINE: NEGATIVE MG/DL
BLOOD, URINE: ABNORMAL
BUN BLDV-MCNC: 27 MG/DL (ref 6–23)
CALCIUM SERPL-MCNC: 9.4 MG/DL (ref 8.3–10.6)
CHLORIDE BLD-SCNC: 100 MMOL/L (ref 99–110)
CLARITY: ABNORMAL
CO2: 24 MMOL/L (ref 21–32)
COLOR: YELLOW
CREAT SERPL-MCNC: 1.5 MG/DL (ref 0.9–1.3)
DIFFERENTIAL TYPE: ABNORMAL
EOSINOPHILS ABSOLUTE: 0.4 K/CU MM
EOSINOPHILS RELATIVE PERCENT: 4.6 % (ref 0–3)
GFR AFRICAN AMERICAN: 54 ML/MIN/1.73M2
GFR NON-AFRICAN AMERICAN: 45 ML/MIN/1.73M2
GLUCOSE BLD-MCNC: 355 MG/DL (ref 70–99)
GLUCOSE BLD-MCNC: 390 MG/DL (ref 70–99)
GLUCOSE, URINE: 150 MG/DL
HCT VFR BLD CALC: 31.4 % (ref 42–52)
HEMOGLOBIN: 10 GM/DL (ref 13.5–18)
IMMATURE NEUTROPHIL %: 0.3 % (ref 0–0.43)
KETONES, URINE: NEGATIVE MG/DL
LACTATE: 1 MMOL/L (ref 0.4–2)
LACTATE: 2.2 MMOL/L (ref 0.4–2)
LEUKOCYTE ESTERASE, URINE: ABNORMAL
LYMPHOCYTES ABSOLUTE: 1.6 K/CU MM
LYMPHOCYTES RELATIVE PERCENT: 18.8 % (ref 24–44)
MCH RBC QN AUTO: 30.3 PG (ref 27–31)
MCHC RBC AUTO-ENTMCNC: 31.8 % (ref 32–36)
MCV RBC AUTO: 95.2 FL (ref 78–100)
MONOCYTES ABSOLUTE: 0.8 K/CU MM
MONOCYTES RELATIVE PERCENT: 8.6 % (ref 0–4)
NITRITE URINE, QUANTITATIVE: NEGATIVE
NUCLEATED RBC %: 0 %
PDW BLD-RTO: 14.1 % (ref 11.7–14.9)
PH, URINE: 7 (ref 5–8)
PLATELET # BLD: 238 K/CU MM (ref 140–440)
PMV BLD AUTO: 11.9 FL (ref 7.5–11.1)
POTASSIUM SERPL-SCNC: 4.7 MMOL/L (ref 3.5–5.1)
PROTEIN UA: 100 MG/DL
RBC # BLD: 3.3 M/CU MM (ref 4.6–6.2)
RBC URINE: 50 /HPF (ref 0–3)
RENAL EPITHELIAL, UA: <1 /HPF
SEGMENTED NEUTROPHILS ABSOLUTE COUNT: 5.9 K/CU MM
SEGMENTED NEUTROPHILS RELATIVE PERCENT: 67.1 % (ref 36–66)
SODIUM BLD-SCNC: 137 MMOL/L (ref 135–145)
SPECIFIC GRAVITY UA: 1.04 (ref 1–1.03)
SQUAMOUS EPITHELIAL: <1 /HPF
TOTAL IMMATURE NEUTOROPHIL: 0.03 K/CU MM
TOTAL NUCLEATED RBC: 0 K/CU MM
TRICHOMONAS: ABNORMAL /HPF
UROBILINOGEN, URINE: NORMAL MG/DL (ref 0.2–1)
WBC # BLD: 8.7 K/CU MM (ref 4–10.5)
WBC CLUMP: ABNORMAL /HPF
WBC UA: 186 /HPF (ref 0–2)

## 2020-09-12 PROCEDURE — 96372 THER/PROPH/DIAG INJ SC/IM: CPT

## 2020-09-12 PROCEDURE — 87086 URINE CULTURE/COLONY COUNT: CPT

## 2020-09-12 PROCEDURE — 6370000000 HC RX 637 (ALT 250 FOR IP): Performed by: EMERGENCY MEDICINE

## 2020-09-12 PROCEDURE — 99285 EMERGENCY DEPT VISIT HI MDM: CPT

## 2020-09-12 PROCEDURE — 81001 URINALYSIS AUTO W/SCOPE: CPT

## 2020-09-12 PROCEDURE — 80048 BASIC METABOLIC PNL TOTAL CA: CPT

## 2020-09-12 PROCEDURE — 85025 COMPLETE CBC W/AUTO DIFF WBC: CPT

## 2020-09-12 PROCEDURE — 74177 CT ABD & PELVIS W/CONTRAST: CPT

## 2020-09-12 PROCEDURE — 6360000004 HC RX CONTRAST MEDICATION

## 2020-09-12 PROCEDURE — 82962 GLUCOSE BLOOD TEST: CPT

## 2020-09-12 PROCEDURE — 83605 ASSAY OF LACTIC ACID: CPT

## 2020-09-12 PROCEDURE — 36415 COLL VENOUS BLD VENIPUNCTURE: CPT

## 2020-09-12 RX ORDER — CEPHALEXIN 250 MG/1
500 CAPSULE ORAL ONCE
Status: COMPLETED | OUTPATIENT
Start: 2020-09-12 | End: 2020-09-12

## 2020-09-12 RX ORDER — POLYETHYLENE GLYCOL 3350 17 G/17G
17 POWDER, FOR SOLUTION ORAL DAILY PRN
Qty: 119 G | Refills: 1 | Status: SHIPPED | OUTPATIENT
Start: 2020-09-12 | End: 2020-10-12

## 2020-09-12 RX ORDER — CEPHALEXIN 500 MG/1
500 CAPSULE ORAL 2 TIMES DAILY
Qty: 14 CAPSULE | Refills: 0 | Status: SHIPPED | OUTPATIENT
Start: 2020-09-12 | End: 2020-09-19

## 2020-09-12 RX ORDER — DOCUSATE SODIUM 100 MG/1
100 CAPSULE, LIQUID FILLED ORAL 2 TIMES DAILY
Qty: 28 CAPSULE | Refills: 0 | Status: SHIPPED | OUTPATIENT
Start: 2020-09-12 | End: 2020-09-26

## 2020-09-12 RX ADMIN — IOPAMIDOL 75 ML: 755 INJECTION, SOLUTION INTRAVENOUS at 18:25

## 2020-09-12 RX ADMIN — CEPHALEXIN 500 MG: 250 CAPSULE ORAL at 22:22

## 2020-09-12 RX ADMIN — INSULIN LISPRO 6 UNITS: 100 INJECTION, SOLUTION INTRAVENOUS; SUBCUTANEOUS at 22:22

## 2020-09-12 NOTE — ED PROVIDER NOTES
EMERGENCY DEPARTMENT ENCOUNTER    Patient: Joe Shaffer  MRN: [id number]  : 1940  Date of Evaluation: 2020  ED Provider:  Laquita Moreno    CHIEF COMPLAINT  Chief Complaint   Patient presents with    Hyperglycemia    Constipation       HPI  Joe Shaffer is a 78 y.o. male with diabetes who presents with elevated blood sugars today with blood sugar as high as 420. Also has had some mild diffuse abdominal cramping with onset today as well as constipation with last bowel movement several days ago. No known modifying factors. Denies any other associated symptoms or complaints or concerns.     REVIEW OF SYSTEMS    I have reviewed the nursing notes    Constitutional: negative for fever, chills, weight change, change in appetite  Neurological: negative for HA, lightheadedness, numbness, weakness  Ophthalmic: negative for vision change  ENT: negative for congestion, runny nose, sore throat  Cardiovascular: negative for chest pain, palpitations  Respiratory: negative for SOB, cough  GI: negative for nausea, vomiting, diarrhea, hematemesis, hematochezia, melena   : negative for dysuria, hematuria, testicular pain or swelling  Musculoskeletal: negative for myalgias, decreased ROM, joint swelling  Dermatological: negative for rash, pruritis  Endocrine: negative for temperature intolerance, diaphoresis  Hematological: negative for anemia, bleeding      PAST MEDICAL HISTORY  Past Medical History:   Diagnosis Date    Acute urinary tract infection 3/15/2012    Ataxia 2010    Diabetes mellitus ([address] 2011    type 2, controlled    Fusion of spine of cervical region 10/2012    Gait disturbance     History of prostate cancer 1996    adenocarcinoma    History of tobacco use     Hyperlipidemia     Osteoarthritis        CURRENT MEDICATIONS  [unfilled]    ALLERGIES  No Known Allergies    SURGICAL HISTORY  Past Surgical History:   Procedure Laterality Date    BLADDER SURGERY      COLON SURGERY      OTHER SURGICAL HISTORY  3/28/13    Cysto with removal of artificial sphinter and placement of suprapubic catheter.     PROSTATE SURGERY      PROSTATECTOMY      infection       FAMILY HISTORY  Family History   Problem Relation Age of Onset   Seun Domingo Cancer Mother     Diabetes Father     Heart Disease Father     High Blood Pressure Father     Diabetes Sister     High Blood Pressure Sister     Cancer Brother         prostate, breast    Diabetes Brother     Cancer Maternal Uncle     Diabetes Maternal Grandmother     Stroke Maternal Grandfather        SOCIAL HISTORY  Social History     Socioeconomic History    Marital status:      Spouse name: None    Number of children: 3    Years of education: None    Highest education level: None   Occupational History    None   Social Needs    Financial resource strain: None    Food insecurity     Worry: None     Inability: None    Transportation needs     Medical: None     Non-medical: None   Tobacco Use    Smoking status: Former Smoker     Packs/day: 0.50     Last attempt to quit: 1976     Years since quittin.2    Smokeless tobacco: Never Used   Substance and Sexual Activity    Alcohol use: No     Comment: Quit in     Drug use: No    Sexual activity: None   Lifestyle    Physical activity     Days per week: None     Minutes per session: None    Stress: None   Relationships    Social connections     Talks on phone: None     Gets together: None     Attends Moravian service: None     Active member of club or organization: None     Attends meetings of clubs or organizations: None     Relationship status: None    Intimate partner violence     Fear of current or ex partner: None     Emotionally abused: None     Physically abused: None     Forced sexual activity: None   Other Topics Concern    None   Social History Narrative    None           **Past medical, family and social histories reviewed and verified by me**      PHYSICAL EXAM  VITAL SIGNS:   ED Triage Vitals   Enc Vitals Group      BP 09/12/20 1625 (!) 148/58      Pulse 09/12/20 1625 70      Resp 09/12/20 1625 16      Temp 09/12/20 1625 98.3 °F (36.8 °C)      Temp src --       SpO2 09/12/20 1625 96 %      Weight 09/12/20 1630 194 lb 0.1 oz (88 kg)      Height 09/12/20 1630 5' 7.99\" (1.727 m)      Head Circumference --       Peak Flow --       Pain Score --       Pain Loc --       Pain Edu? --       Excl. in [address] [place] [address] during ED course were reviewed and are as charted. Constitutional: Minimal distress, Non-toxic appearance    Eyes: Conjunctiva normal, No discharge    HENT: Normocephalic, Atraumatic, Bilateral external ears normal, posterior oropharynx is nonerythematous and without exudate, uvula is midline, oropharynx moist    Neck: Supple, no stridor, no grossly visible or palpable masses    Cardiovascular: Regular rate and rhythm, No murmurs, No rubs, No gallops    Pulmonary/Chest:  Normal breath sounds, No respiratory distress or accessory muscle use, No wheezing, crackles or rhonchi. Abdomen: Mildly distended and mildly tender diffusely without peritoneal signs, otherwise abdomen is soft, nonrigid, No masses, normal bowel sounds    Back:  No midline point tenderness, No paraspinous muscle tenderness.   No CVA tenderness    Extremities:  No gross deformities, no edema, no tenderness    Neurologic:  Normal motor function, Normal sensory function, No focal deficits    Skin:  Warm, Dry, No erythema, No rash, No cyanosis, No mottling    Lymphatic:  No inguinal lymphadenopathy          RADIOLOGY/PROCEDURES/LABS/MEDICATIONS ADMINISTERED:    I have reviewed and interpreted all of the currently available lab results from this visit (if applicable):  Results for orders placed or performed during the hospital encounter of 09/12/20   CBC Auto Differential   Result Value Ref Range    WBC 8.7 4.0 - 10.5 K/CU MM    RBC 3.30 (L) 4.6 - 6.2 M/CU MM    Hemoglobin 10.0 (L) 13.5 - 18.0 GM/DL    Hematocrit 31.4 (L) 42 - 52 %    MCV 95.2 78 - 100 FL    MCH 30.3 27 - 31 PG    MCHC 31.8 (L) 32.0 - 36.0 %    RDW 14.1 11.7 - 14.9 %    Platelets 689 467 - 786 K/CU MM    MPV 11.9 (H) 7.5 - 11.1 FL    Differential Type AUTOMATED DIFFERENTIAL     Segs Relative 67.1 (H) 36 - 66 %    Lymphocytes % 18.8 (L) 24 - 44 %    Monocytes % 8.6 (H) 0 - 4 %    Eosinophils % 4.6 (H) 0 - 3 %    Basophils % 0.6 0 - 1 %    Segs Absolute 5.9 K/CU MM    Lymphocytes Absolute 1.6 K/CU MM    Monocytes Absolute 0.8 K/CU MM    Eosinophils Absolute 0.4 K/CU MM    Basophils Absolute 0.1 K/CU MM    Nucleated RBC % 0.0 %    Total Nucleated RBC 0.0 K/CU MM    Total Immature Neutrophil 0.03 K/CU MM    Immature Neutrophil % 0.3 0 - 0.43 %   Basic Metabolic Panel w/ Reflex to MG   Result Value Ref Range    Sodium 137 135 - 145 MMOL/L    Potassium 4.7 3.5 - 5.1 MMOL/L    Chloride 100 99 - 110 mMol/L    CO2 24 21 - 32 MMOL/L    Anion Gap 13 4 - 16    BUN 27 (H) 6 - 23 MG/DL    CREATININE 1.5 (H) 0.9 - 1.3 MG/DL    Glucose 355 (H) 70 - 99 MG/DL    Calcium 9.4 8.3 - 10.6 MG/DL    GFR Non- 45 (L) >60 mL/min/1.73m2    GFR  54 (L) >60 mL/min/1.73m2   Lactic Acid, Plasma   Result Value Ref Range    Lactate 2.2 (HH) 0.4 - 2.0 mMOL/L   Urinalysis with microscopic   Result Value Ref Range    Color, UA YELLOW YELLOW    Clarity, UA SLIGHTLY CLOUDY (A) CLEAR    Glucose, Urine 150 (A) NEGATIVE MG/DL    Bilirubin Urine NEGATIVE NEGATIVE MG/DL    Ketones, Urine NEGATIVE NEGATIVE MG/DL    Specific Gravity, UA 1.036 (H) 1.001 - 1.035    Blood, Urine SMALL (A) NEGATIVE    pH, Urine 7.0 5.0 - 8.0    Protein,  (A) NEGATIVE MG/DL    Urobilinogen, Urine NORMAL 0.2 - 1.0 MG/DL    Nitrite Urine, Quantitative NEGATIVE NEGATIVE    Leukocyte Esterase, Urine LARGE (A) NEGATIVE    RBC, UA 50 (H) 0 - 3 /HPF    WBC,  (H) 0 - 2 /HPF    Bacteria, UA MODERATE (A) NEGATIVE /HPF    WBC Clumps, UA MODERATE /HPF Squam Epithel, UA <1 /HPF    Renal Epithelial, UA <1 /HPF    Trichomonas, UA NONE SEEN NONE SEEN /HPF   POCT Glucose   Result Value Ref Range    POC Glucose 390 (H) 70 - 99 MG/DL          ABNORMAL LABS:  Labs Reviewed   CBC WITH AUTO DIFFERENTIAL - Abnormal; Notable for the following components:       Result Value    RBC 3.30 (*)     Hemoglobin 10.0 (*)     Hematocrit 31.4 (*)     MCHC 31.8 (*)     MPV 11.9 (*)     Segs Relative 67.1 (*)     Lymphocytes % 18.8 (*)     Monocytes % 8.6 (*)     Eosinophils % 4.6 (*)     All other components within normal limits   BASIC METABOLIC PANEL W/ REFLEX TO MG FOR LOW K - Abnormal; Notable for the following components:    BUN 27 (*)     CREATININE 1.5 (*)     Glucose 355 (*)     GFR Non- 45 (*)     GFR  54 (*)     All other components within normal limits   LACTIC ACID, PLASMA - Abnormal; Notable for the following components:    Lactate 2.2 (*)     All other components within normal limits   URINALYSIS WITH MICROSCOPIC - Abnormal; Notable for the following components:    Clarity, UA SLIGHTLY CLOUDY (*)     Glucose, Urine 150 (*)     Specific Gravity, UA 1.036 (*)     Blood, Urine SMALL (*)     Protein,  (*)     Leukocyte Esterase, Urine LARGE (*)     RBC, UA 50 (*)     WBC,  (*)     Bacteria, UA MODERATE (*)     All other components within normal limits   POCT GLUCOSE - Abnormal; Notable for the following components:    POC Glucose 390 (*)     All other components within normal limits   CULTURE, URINE   LACTIC ACID, PLASMA         IMAGING STUDIES ORDERED:  CT ABDOMEN PELVIS W IV CONTRAST  STRAIGHT CATH    I have personally viewed the imaging studies. The radiologist interpretation is:   CT ABDOMEN PELVIS W IV CONTRAST Additional Contrast? None   Preliminary Result   Minimal to mild left hydronephrosis and hydroureter, which appears to have   improved when compared to the prior study. No evidence of obstructing stone   seen. Stable suprapubic catheter. MEDICATIONS ADMINISTERED:  Medications   cephALEXin (KEFLEX) capsule 500 mg (has no administration in time range)   insulin lispro (HUMALOG) injection vial 6 Units (has no administration in time range)   iopamidol (ISOVUE-370) 76 % injection 75 mL (75 mLs Intravenous Given 9/12/20 2936)         COURSE & MEDICAL DECISION MAKING  Last vitals: BP (!) 111/99   Pulse 70   Temp 98.3 °F (36.8 °C)   Resp 14   Ht 5' 7.99\" (1.727 m)   Wt 194 lb 0.1 oz (88 kg)   SpO2 97%   BMI 29.51 kg/m²     78year-old male with constipation. Urinalysis suggestive of UTI. No evidence to suggest pyelonephritis or sepsis however. Also noted to have some hyperglycemia. Is not significantly elevated and there is no evidence for DKA or HHS or any other electrolyte or metabolic abnormalities. Differential diagnoses considered included, but were not limited to, acute appendicitis, acute cholecystitis/cholangitis, acute hepatitis, acute pancreatitis, acute coronary syndrome, acute intra-abdominal catastrophe, acute vascular emergency, bowel obstruction, perforated bowel, incarcerated or strangulated hernia, colitis, diverticulitis, ischemic bowel, cystitis/UTI/pyelonephritis, kidney stone, and acute testicular torsion/pathology. Patient was given the above medications with improvement in symptoms. On repeat examination the abdomen was non-surgical without peritoneal signs. The patient was able to tolerate oral intake. Patient was advised of the changing nature of intra-abdominal pathology and specific signs and symptoms to watch which may signal serious infectious, metabolic or surgical conditions for which immediate return to the ED would be necessary for further diagnosis and treatment. Additional workup and treatment in the ED as documented above. Patient reassured and will be discharged home.  I have explained to the patient in appropriate terminology our work-up in the ED and on occasion the spoken word can be misinterpreted by the technology leading to omissions or inappropriate words, phrases or sentences.         Damaris Johnston MD  09/12/20 3000

## 2020-09-13 NOTE — ED NOTES
Patient resting in bed. Call light within reach. Waiting for plan of care from Dr. Colette Mclaughlin. Will continue to monitor.       Marjorie Archuleta RN  09/12/20 212

## 2020-09-13 NOTE — ED NOTES
Patient resting in bed with eyes closed. Denies needing anything at this time. Will continue to monitor. Call light within reach.       Dinora Quintana RN  09/12/20 2001

## 2020-09-14 LAB
CULTURE: NORMAL
Lab: NORMAL
SPECIMEN: NORMAL

## 2020-10-24 ENCOUNTER — HOSPITAL ENCOUNTER (EMERGENCY)
Age: 80
Discharge: HOME | End: 2020-10-25
Attending: EMERGENCY MEDICINE
Payer: MEDICARE

## 2020-10-24 LAB
ANION GAP SERPL CALCULATED.3IONS-SCNC: 12 MMOL/L (ref 4–16)
BASOPHILS ABSOLUTE: 0 K/CU MM
BASOPHILS RELATIVE PERCENT: 0.4 % (ref 0–1)
BUN BLDV-MCNC: 18 MG/DL (ref 6–23)
CALCIUM SERPL-MCNC: 8.8 MG/DL (ref 8.3–10.6)
CHLORIDE BLD-SCNC: 100 MMOL/L (ref 99–110)
CO2: 23 MMOL/L (ref 21–32)
CREAT SERPL-MCNC: 1.5 MG/DL (ref 0.9–1.3)
DIFFERENTIAL TYPE: ABNORMAL
EOSINOPHILS ABSOLUTE: 0.3 K/CU MM
EOSINOPHILS RELATIVE PERCENT: 3.4 % (ref 0–3)
GFR AFRICAN AMERICAN: 54 ML/MIN/1.73M2
GFR NON-AFRICAN AMERICAN: 45 ML/MIN/1.73M2
GLUCOSE BLD-MCNC: 301 MG/DL (ref 70–99)
HCT VFR BLD CALC: 32.9 % (ref 42–52)
HEMOGLOBIN: 10.4 GM/DL (ref 13.5–18)
IMMATURE NEUTROPHIL %: 0.4 % (ref 0–0.43)
LYMPHOCYTES ABSOLUTE: 1.6 K/CU MM
LYMPHOCYTES RELATIVE PERCENT: 16.9 % (ref 24–44)
MCH RBC QN AUTO: 30.8 PG (ref 27–31)
MCHC RBC AUTO-ENTMCNC: 31.6 % (ref 32–36)
MCV RBC AUTO: 97.3 FL (ref 78–100)
MONOCYTES ABSOLUTE: 0.7 K/CU MM
MONOCYTES RELATIVE PERCENT: 7.2 % (ref 0–4)
NUCLEATED RBC %: 0 %
PDW BLD-RTO: 13.9 % (ref 11.7–14.9)
PLATELET # BLD: 261 K/CU MM (ref 140–440)
PMV BLD AUTO: 11.3 FL (ref 7.5–11.1)
POTASSIUM SERPL-SCNC: 4.2 MMOL/L (ref 3.5–5.1)
RBC # BLD: 3.38 M/CU MM (ref 4.6–6.2)
SEGMENTED NEUTROPHILS ABSOLUTE COUNT: 6.6 K/CU MM
SEGMENTED NEUTROPHILS RELATIVE PERCENT: 71.7 % (ref 36–66)
SODIUM BLD-SCNC: 135 MMOL/L (ref 135–145)
TOTAL IMMATURE NEUTOROPHIL: 0.04 K/CU MM
TOTAL NUCLEATED RBC: 0 K/CU MM
TROPONIN T: 0.02 NG/ML
WBC # BLD: 9.2 K/CU MM (ref 4–10.5)

## 2020-10-24 PROCEDURE — 84484 ASSAY OF TROPONIN QUANT: CPT

## 2020-10-24 PROCEDURE — 99284 EMERGENCY DEPT VISIT MOD MDM: CPT

## 2020-10-24 PROCEDURE — 85025 COMPLETE CBC W/AUTO DIFF WBC: CPT

## 2020-10-24 PROCEDURE — 6370000000 HC RX 637 (ALT 250 FOR IP): Performed by: EMERGENCY MEDICINE

## 2020-10-24 PROCEDURE — 93005 ELECTROCARDIOGRAM TRACING: CPT | Performed by: EMERGENCY MEDICINE

## 2020-10-24 PROCEDURE — 36415 COLL VENOUS BLD VENIPUNCTURE: CPT

## 2020-10-24 PROCEDURE — 80048 BASIC METABOLIC PNL TOTAL CA: CPT

## 2020-10-24 RX ORDER — MINERAL OIL 100 G/100G
1 OIL RECTAL PRN
Status: DISCONTINUED | OUTPATIENT
Start: 2020-10-24 | End: 2020-10-25 | Stop reason: HOSPADM

## 2020-10-24 RX ORDER — LACTULOSE 10 G/10G
10 SOLUTION ORAL 3 TIMES DAILY
Qty: 15 EACH | Refills: 0 | Status: SHIPPED | OUTPATIENT
Start: 2020-10-24 | End: 2020-10-25 | Stop reason: SDUPTHER

## 2020-10-24 RX ADMIN — MINERAL OIL 1 ENEMA: 100 ENEMA RECTAL at 22:57

## 2020-10-24 ASSESSMENT — PAIN SCALES - GENERAL: PAINLEVEL_OUTOF10: 7

## 2020-10-24 ASSESSMENT — PAIN DESCRIPTION - LOCATION: LOCATION: ABDOMEN

## 2020-10-25 VITALS
SYSTOLIC BLOOD PRESSURE: 113 MMHG | HEIGHT: 68 IN | BODY MASS INDEX: 29.55 KG/M2 | DIASTOLIC BLOOD PRESSURE: 66 MMHG | TEMPERATURE: 98.7 F | WEIGHT: 195 LBS | HEART RATE: 73 BPM | OXYGEN SATURATION: 99 % | RESPIRATION RATE: 15 BRPM

## 2020-10-25 PROCEDURE — 93010 ELECTROCARDIOGRAM REPORT: CPT | Performed by: INTERNAL MEDICINE

## 2020-10-25 RX ORDER — LACTULOSE 10 G/10G
10 SOLUTION ORAL 3 TIMES DAILY
Qty: 15 EACH | Refills: 0 | Status: SHIPPED | OUTPATIENT
Start: 2020-10-25 | End: 2020-10-30

## 2020-10-25 NOTE — ED TRIAGE NOTES
Pt presents to the ED by squad from home for Syncopy while sitting on the toilet. Pt reports he was mixing an enema d/t constipation for 1 week. Pt reports some Abd Pain. Unknown if hit head and denies taking blood thinning medication.

## 2020-10-25 NOTE — ED NOTES
Pt on bedside commode for 45 minutes and is unable to have a bowel movement.       Brigitte Seth, RN  10/24/20 0138

## 2020-10-25 NOTE — ED PROVIDER NOTES
phone: None     Gets together: None     Attends Jainism service: None     Active member of club or organization: None     Attends meetings of clubs or organizations: None     Relationship status: None    Intimate partner violence     Fear of current or ex partner: None     Emotionally abused: None     Physically abused: None     Forced sexual activity: None   Other Topics Concern    None   Social History Narrative    None       SCREENINGS             PHYSICAL EXAM       ED Triage Vitals   BP Temp Temp Source Pulse Resp SpO2 Height Weight   10/24/20 2021 10/24/20 2021 10/24/20 2021 10/24/20 2021 10/24/20 2021 10/24/20 2021 10/24/20 2010 10/24/20 2010   (!) 150/67 98.7 °F (37.1 °C) Oral 70 16 98 % 5' 8\" (1.727 m) 195 lb (88.5 kg)       Physical Exam  General appearance: Alert, cooperative, no distress, appears stated age. Head:  Normocephalic, without obvious abnormality, atraumatic.   HEENT: Mucous membranes moist.  Neck: Full ROM, trachea midline, no JVD  Lungs: No respiratory distress  Cardiovasular: Perfusing extremities  Abdomen: No guarding, nontender  Extremities: Atraumatic, full ROM  Skin: No rashes or lesions to exposed skin  Neurologic: Alert and oriented x3, motor grossly normal, clear speech    DIAGNOSTIC RESULTS     EKG: Sinus rhythm, first-degree AV block, normal QTC    IMAGING:   Non-plain film images such as CT, Ultrasound and MRI are read by the radiologist.Plain radiographic images are visualized and preliminarily interpreted by the emergency physician with the below findings:        Interpretation per the Radiologist below, if available at the time of this note:    No orders to display         EDBEDSIDE ULTRASOUND:   Performed by EDPhysician - none    LABS:  Labs Reviewed   CBC WITH AUTO DIFFERENTIAL - Abnormal; Notable for the following components:       Result Value    RBC 3.38 (*)     Hemoglobin 10.4 (*)     Hematocrit 32.9 (*)     MCHC 31.6 (*)     MPV 11.3 (*)     Segs Relative 71.7 (*) Lymphocytes % 16.9 (*)     Monocytes % 7.2 (*)     Eosinophils % 3.4 (*)     All other components within normal limits   BASIC METABOLIC PANEL - Abnormal; Notable for the following components:    CREATININE 1.5 (*)     Glucose 301 (*)     GFR Non- 45 (*)     GFR  54 (*)     All other components within normal limits   TROPONIN - Abnormal; Notable for the following components:    Troponin T 0.016 (*)     All other components within normal limits       All other labs were within normal range or not returned as of this dictation. EMERGENCY DEPARTMENT COURSE and DIFFERENTIAL DIAGNOSIS/MDM:   Vitals:    Vitals:    10/24/20 2010 10/24/20 2021   BP:  (!) 150/67   Pulse:  70   Resp:  16   Temp:  98.7 °F (37.1 °C)   TempSrc:  Oral   SpO2:  98%   Weight: 195 lb (88.5 kg)    Height: 5' 8\" (1.727 m)        Medications   mineral oil enema 1 enema (has no administration in time range)       MDM     Patient presents with constipation and syncope. Vital signs are stable. Examination shows a patient in no acute distress, abdominal exam is benign with minimal tenderness to palpation, no rebound, no guarding. Patient's history most consistent with vasovagal episode. I will give the patient a mineral oil enema for his stooling, I will prescribe him lactulose to help his bowel movements until he is able to clear his constipation, patient will be discharged home with PCP follow-up. Of note the patient's troponin is slightly elevated but this is normal for this patient. REASSESSMENT          CRITICAL CARE TIME   Critical Care time was 0 minutes, excluding separately reportable procedures. There was a high probability of clinically significant/life threatening deteriorationin the patient's condition which required my urgent intervention. CONSULTS:  None     PROCEDURES:  Unless otherwise noted below, none     Procedures    FINAL IMPRESSION      1.  Constipation, unspecified constipation type    2. Syncope and collapse          DISPOSITION/PLAN   DISPOSITION Discharge - Pending Orders Complete 10/24/2020 09:45:54 PM      PATIENT REFERRED TO:  Lisa Batista MD  [address] [facility]  [place] [address] [facility]  [phone]    Schedule an appointment as soon as possible for a visit in 3 days      [facility] Emergency Department  [place] [id number] [phone]    If symptoms worsen      DISCHARGE MEDICATIONS:  New Prescriptions    LACTULOSE ([address]) 10 G PACKET    Take 1 packet by mouth 3 times daily for 5 days Until having normal stools          (Please note that portions of this note were completed with a voicerecognition program.  Efforts were made to edit the dictations but occasionally words are mis-transcribed.)    Ag Fuchs MD (electronically signed)  Attending Emergency Physician            Ag Fuchs MD  10/24/20 9052

## 2020-10-28 LAB
EKG ATRIAL RATE: 72 BPM
EKG DIAGNOSIS: NORMAL
EKG P AXIS: 90 DEGREES
EKG P-R INTERVAL: 222 MS
EKG Q-T INTERVAL: 372 MS
EKG QRS DURATION: 66 MS
EKG QTC CALCULATION (BAZETT): 407 MS
EKG R AXIS: -4 DEGREES
EKG T AXIS: 7 DEGREES
EKG VENTRICULAR RATE: 72 BPM

## 2021-11-16 ENCOUNTER — APPOINTMENT (OUTPATIENT)
Dept: CT IMAGING | Age: 81
DRG: 948 | End: 2021-11-16
Payer: MEDICARE

## 2021-11-16 ENCOUNTER — APPOINTMENT (OUTPATIENT)
Dept: GENERAL RADIOLOGY | Age: 81
DRG: 948 | End: 2021-11-16
Payer: MEDICARE

## 2021-11-16 ENCOUNTER — HOSPITAL ENCOUNTER (OUTPATIENT)
Age: 81
Setting detail: OBSERVATION
Discharge: ANOTHER HOSPITAL | DRG: 948 | End: 2021-11-18
Attending: STUDENT IN AN ORGANIZED HEALTH CARE EDUCATION/TRAINING PROGRAM | Admitting: INTERNAL MEDICINE
Payer: MEDICARE

## 2021-11-16 DIAGNOSIS — R29.6 FREQUENT FALLS: ICD-10-CM

## 2021-11-16 DIAGNOSIS — R42 LIGHTHEADEDNESS: Primary | ICD-10-CM

## 2021-11-16 DIAGNOSIS — S09.90XA CLOSED HEAD INJURY, INITIAL ENCOUNTER: ICD-10-CM

## 2021-11-16 PROBLEM — N39.0 UTI (URINARY TRACT INFECTION): Status: ACTIVE | Noted: 2021-11-16

## 2021-11-16 LAB
ALBUMIN SERPL-MCNC: 3.9 GM/DL (ref 3.4–5)
ALP BLD-CCNC: 102 IU/L (ref 40–129)
ALT SERPL-CCNC: 19 U/L (ref 10–40)
AMORPHOUS: ABNORMAL /HPF
ANION GAP SERPL CALCULATED.3IONS-SCNC: 12 MMOL/L (ref 4–16)
AST SERPL-CCNC: 23 IU/L (ref 15–37)
BACTERIA: ABNORMAL /HPF
BASOPHILS ABSOLUTE: 0.1 K/CU MM
BASOPHILS RELATIVE PERCENT: 0.7 % (ref 0–1)
BILIRUB SERPL-MCNC: 0.3 MG/DL (ref 0–1)
BILIRUBIN URINE: NEGATIVE MG/DL
BLOOD, URINE: NEGATIVE
BUN BLDV-MCNC: 33 MG/DL (ref 6–23)
CALCIUM SERPL-MCNC: 9.1 MG/DL (ref 8.3–10.6)
CHLORIDE BLD-SCNC: 106 MMOL/L (ref 99–110)
CLARITY: ABNORMAL
CO2: 23 MMOL/L (ref 21–32)
COLOR: YELLOW
CREAT SERPL-MCNC: 1.9 MG/DL (ref 0.9–1.3)
DIFFERENTIAL TYPE: ABNORMAL
EOSINOPHILS ABSOLUTE: 0.5 K/CU MM
EOSINOPHILS RELATIVE PERCENT: 7.1 % (ref 0–3)
GFR AFRICAN AMERICAN: 41 ML/MIN/1.73M2
GFR NON-AFRICAN AMERICAN: 34 ML/MIN/1.73M2
GLUCOSE BLD-MCNC: 166 MG/DL (ref 70–99)
GLUCOSE, URINE: NEGATIVE MG/DL
HCT VFR BLD CALC: 33 % (ref 42–52)
HEMOGLOBIN: 10.1 GM/DL (ref 13.5–18)
IMMATURE NEUTROPHIL %: 0.1 % (ref 0–0.43)
KETONES, URINE: NEGATIVE MG/DL
LEUKOCYTE ESTERASE, URINE: ABNORMAL
LYMPHOCYTES ABSOLUTE: 1.8 K/CU MM
LYMPHOCYTES RELATIVE PERCENT: 25.8 % (ref 24–44)
MCH RBC QN AUTO: 28.8 PG (ref 27–31)
MCHC RBC AUTO-ENTMCNC: 30.6 % (ref 32–36)
MCV RBC AUTO: 94 FL (ref 78–100)
MONOCYTES ABSOLUTE: 0.6 K/CU MM
MONOCYTES RELATIVE PERCENT: 9.2 % (ref 0–4)
NITRITE URINE, QUANTITATIVE: NEGATIVE
NUCLEATED RBC %: 0 %
PDW BLD-RTO: 16.8 % (ref 11.7–14.9)
PH, URINE: 8 (ref 5–8)
PLATELET # BLD: 239 K/CU MM (ref 140–440)
PMV BLD AUTO: 11.9 FL (ref 7.5–11.1)
POTASSIUM SERPL-SCNC: 4.5 MMOL/L (ref 3.5–5.1)
PROTEIN UA: 100 MG/DL
RBC # BLD: 3.51 M/CU MM (ref 4.6–6.2)
RBC URINE: 34 /HPF (ref 0–3)
SARS-COV-2, NAAT: NOT DETECTED
SEGMENTED NEUTROPHILS ABSOLUTE COUNT: 4 K/CU MM
SEGMENTED NEUTROPHILS RELATIVE PERCENT: 57.1 % (ref 36–66)
SODIUM BLD-SCNC: 141 MMOL/L (ref 135–145)
SOURCE: NORMAL
SPECIFIC GRAVITY UA: 1.02 (ref 1–1.03)
TOTAL IMMATURE NEUTOROPHIL: 0.01 K/CU MM
TOTAL NUCLEATED RBC: 0 K/CU MM
TOTAL PROTEIN: 6.8 GM/DL (ref 6.4–8.2)
TRICHOMONAS: ABNORMAL /HPF
TRIPLE PHOSPHATE CRYSTALS: ABNORMAL /HPF
TROPONIN T: 0.03 NG/ML
UROBILINOGEN, URINE: NEGATIVE MG/DL (ref 0.2–1)
WBC # BLD: 6.9 K/CU MM (ref 4–10.5)
WBC UA: 13 /HPF (ref 0–2)

## 2021-11-16 PROCEDURE — 72125 CT NECK SPINE W/O DYE: CPT

## 2021-11-16 PROCEDURE — 99285 EMERGENCY DEPT VISIT HI MDM: CPT

## 2021-11-16 PROCEDURE — 85025 COMPLETE CBC W/AUTO DIFF WBC: CPT

## 2021-11-16 PROCEDURE — 6360000002 HC RX W HCPCS: Performed by: STUDENT IN AN ORGANIZED HEALTH CARE EDUCATION/TRAINING PROGRAM

## 2021-11-16 PROCEDURE — 87635 SARS-COV-2 COVID-19 AMP PRB: CPT

## 2021-11-16 PROCEDURE — 70450 CT HEAD/BRAIN W/O DYE: CPT

## 2021-11-16 PROCEDURE — 71045 X-RAY EXAM CHEST 1 VIEW: CPT

## 2021-11-16 PROCEDURE — 6370000000 HC RX 637 (ALT 250 FOR IP): Performed by: FAMILY MEDICINE

## 2021-11-16 PROCEDURE — 6360000002 HC RX W HCPCS: Performed by: FAMILY MEDICINE

## 2021-11-16 PROCEDURE — 84484 ASSAY OF TROPONIN QUANT: CPT

## 2021-11-16 PROCEDURE — 80053 COMPREHEN METABOLIC PANEL: CPT

## 2021-11-16 PROCEDURE — 2580000003 HC RX 258: Performed by: FAMILY MEDICINE

## 2021-11-16 PROCEDURE — 96372 THER/PROPH/DIAG INJ SC/IM: CPT

## 2021-11-16 PROCEDURE — 87186 SC STD MICRODIL/AGAR DIL: CPT

## 2021-11-16 PROCEDURE — 81001 URINALYSIS AUTO W/SCOPE: CPT

## 2021-11-16 PROCEDURE — 87086 URINE CULTURE/COLONY COUNT: CPT

## 2021-11-16 PROCEDURE — 2580000003 HC RX 258: Performed by: STUDENT IN AN ORGANIZED HEALTH CARE EDUCATION/TRAINING PROGRAM

## 2021-11-16 PROCEDURE — 87077 CULTURE AEROBIC IDENTIFY: CPT

## 2021-11-16 PROCEDURE — G0378 HOSPITAL OBSERVATION PER HR: HCPCS

## 2021-11-16 PROCEDURE — 96365 THER/PROPH/DIAG IV INF INIT: CPT

## 2021-11-16 PROCEDURE — 93005 ELECTROCARDIOGRAM TRACING: CPT | Performed by: STUDENT IN AN ORGANIZED HEALTH CARE EDUCATION/TRAINING PROGRAM

## 2021-11-16 RX ORDER — 0.9 % SODIUM CHLORIDE 0.9 %
500 INTRAVENOUS SOLUTION INTRAVENOUS ONCE
Status: COMPLETED | OUTPATIENT
Start: 2021-11-16 | End: 2021-11-16

## 2021-11-16 RX ORDER — ONDANSETRON 4 MG/1
4 TABLET, ORALLY DISINTEGRATING ORAL EVERY 8 HOURS PRN
Status: DISCONTINUED | OUTPATIENT
Start: 2021-11-16 | End: 2021-11-18 | Stop reason: HOSPADM

## 2021-11-16 RX ORDER — LABETALOL 200 MG/1
300 TABLET, FILM COATED ORAL 2 TIMES DAILY
Status: DISCONTINUED | OUTPATIENT
Start: 2021-11-16 | End: 2021-11-16 | Stop reason: ALTCHOICE

## 2021-11-16 RX ORDER — ONDANSETRON 2 MG/ML
4 INJECTION INTRAMUSCULAR; INTRAVENOUS EVERY 6 HOURS PRN
Status: DISCONTINUED | OUTPATIENT
Start: 2021-11-16 | End: 2021-11-18 | Stop reason: HOSPADM

## 2021-11-16 RX ORDER — ACETAMINOPHEN 325 MG/1
650 TABLET ORAL EVERY 6 HOURS PRN
Status: DISCONTINUED | OUTPATIENT
Start: 2021-11-16 | End: 2021-11-18 | Stop reason: HOSPADM

## 2021-11-16 RX ORDER — LISINOPRIL 5 MG/1
5 TABLET ORAL DAILY
Status: DISCONTINUED | OUTPATIENT
Start: 2021-11-17 | End: 2021-11-18 | Stop reason: HOSPADM

## 2021-11-16 RX ORDER — LABETALOL 200 MG/1
200 TABLET, FILM COATED ORAL EVERY 12 HOURS SCHEDULED
Status: DISCONTINUED | OUTPATIENT
Start: 2021-11-16 | End: 2021-11-18 | Stop reason: HOSPADM

## 2021-11-16 RX ORDER — SODIUM CHLORIDE 0.9 % (FLUSH) 0.9 %
5-40 SYRINGE (ML) INJECTION PRN
Status: DISCONTINUED | OUTPATIENT
Start: 2021-11-16 | End: 2021-11-18 | Stop reason: HOSPADM

## 2021-11-16 RX ORDER — POLYETHYLENE GLYCOL 3350 17 G/17G
17 POWDER, FOR SOLUTION ORAL DAILY PRN
Status: DISCONTINUED | OUTPATIENT
Start: 2021-11-16 | End: 2021-11-18 | Stop reason: HOSPADM

## 2021-11-16 RX ORDER — SODIUM CHLORIDE 9 MG/ML
25 INJECTION, SOLUTION INTRAVENOUS PRN
Status: DISCONTINUED | OUTPATIENT
Start: 2021-11-16 | End: 2021-11-18 | Stop reason: HOSPADM

## 2021-11-16 RX ORDER — LABETALOL 100 MG/1
100 TABLET, FILM COATED ORAL EVERY 12 HOURS SCHEDULED
Status: DISCONTINUED | OUTPATIENT
Start: 2021-11-16 | End: 2021-11-18 | Stop reason: HOSPADM

## 2021-11-16 RX ORDER — SODIUM CHLORIDE 0.9 % (FLUSH) 0.9 %
5-40 SYRINGE (ML) INJECTION EVERY 12 HOURS SCHEDULED
Status: DISCONTINUED | OUTPATIENT
Start: 2021-11-16 | End: 2021-11-18 | Stop reason: HOSPADM

## 2021-11-16 RX ORDER — DOCUSATE SODIUM 100 MG/1
100 CAPSULE, LIQUID FILLED ORAL 2 TIMES DAILY PRN
Status: DISCONTINUED | OUTPATIENT
Start: 2021-11-16 | End: 2021-11-18 | Stop reason: HOSPADM

## 2021-11-16 RX ORDER — OXYBUTYNIN CHLORIDE 5 MG/1
5 TABLET, EXTENDED RELEASE ORAL NIGHTLY
Status: DISCONTINUED | OUTPATIENT
Start: 2021-11-16 | End: 2021-11-18 | Stop reason: HOSPADM

## 2021-11-16 RX ORDER — AMLODIPINE BESYLATE 5 MG/1
5 TABLET ORAL DAILY
Status: DISCONTINUED | OUTPATIENT
Start: 2021-11-17 | End: 2021-11-17

## 2021-11-16 RX ADMIN — SODIUM CHLORIDE, PRESERVATIVE FREE 10 ML: 5 INJECTION INTRAVENOUS at 20:58

## 2021-11-16 RX ADMIN — SODIUM CHLORIDE 500 ML: 9 INJECTION, SOLUTION INTRAVENOUS at 14:28

## 2021-11-16 RX ADMIN — LABETALOL HYDROCHLORIDE 200 MG: 200 TABLET, FILM COATED ORAL at 20:57

## 2021-11-16 RX ADMIN — CEFTRIAXONE SODIUM 1000 MG: 1 INJECTION, POWDER, FOR SOLUTION INTRAMUSCULAR; INTRAVENOUS at 14:28

## 2021-11-16 RX ADMIN — OXYBUTYNIN CHLORIDE 5 MG: 5 TABLET, EXTENDED RELEASE ORAL at 20:57

## 2021-11-16 RX ADMIN — LABETALOL HYDROCHLORIDE 100 MG: 200 TABLET, FILM COATED ORAL at 20:59

## 2021-11-16 RX ADMIN — ENOXAPARIN SODIUM 40 MG: 100 INJECTION SUBCUTANEOUS at 20:58

## 2021-11-16 ASSESSMENT — PAIN SCALES - GENERAL: PAINLEVEL_OUTOF10: 0

## 2021-11-16 NOTE — ED NOTES
Error note     Quyen Seth RN  11/16/21 9040 [facility] Lea Athens, RN  11/16/21 2741

## 2021-11-16 NOTE — ED PROVIDER NOTES
Emergency Department Encounter    Patient: Placido Curran  MRN: [id number]  : 1940  Date of Evaluation: 2021  ED Provider:  Kevin Desir MD    Triage Chief Complaint:   Fatigue (pt states same as when he gets UTIs.)    [place]:  Placido Curran is a 78 y.o. male with history seen below including prostate cancer with a suprapubic catheter in place presenting with lightheadedness for the past week. Patient states that the past week he has had increasing lightheadedness when he stands up. Patient states when he stands up he gets lightheaded and presyncopal and has fallen several times. Patient states he is not actually syncopized but falls because of the presyncopal episodes and fatigue when he stands up. States he has no lightheadedness or dizziness when he sitting down. Denies chest pain, shortness of breath, abdominal pain. Patient states he will sometimes have the symptoms when he has urinary tract infection and does describe mildly increased cloudiness in his urine from baseline. Denies headache, blurred vision, focal neuro deficits, sensory changes, neck or back pain. Denies nausea vomiting, change in bowel habits, cough, sputum production, fevers or chills. ROS - see HPI, below listed is current ROS at time of my eval:  At least 10 point review of system reviewed, negative other HPI. Past Medical History:   Diagnosis Date    Acute urinary tract infection 3/15/2012    Ataxia 2010    Diabetes mellitus ([facility] [address]     type 2, controlled    Fusion of spine of cervical region 10/2012    Gait disturbance     History of prostate cancer     adenocarcinoma    History of tobacco use     Hyperlipidemia     Osteoarthritis      Past Surgical History:   Procedure Laterality Date    BLADDER SURGERY      COLON SURGERY      OTHER SURGICAL HISTORY  3/28/13    Cysto with removal of artificial sphinter and placement of suprapubic catheter.     PROSTATE SURGERY      PROSTATECTOMY  1996    infection     Family History   Problem Relation Age of Onset   Suha Manual Cancer Mother     Diabetes Father     Heart Disease Father     High Blood Pressure Father     Diabetes Sister     High Blood Pressure Sister     Cancer Brother         prostate, breast    Diabetes Brother     Cancer Maternal Uncle     Diabetes Maternal Grandmother     Stroke Maternal Grandfather      Social History     Socioeconomic History    Marital status:      Spouse name: Not on file    Number of children: 3    Years of education: Not on file    Highest education level: Not on file   Occupational History    Not on file   Tobacco Use    Smoking status: Former Smoker     Packs/day: 0.50     Quit date: 1976     Years since quittin.4    Smokeless tobacco: Never Used   Vaping Use    Vaping Use: Not on file   Substance and Sexual Activity    Alcohol use: No     Comment: Quit in     Drug use: No    Sexual activity: Not on file   Other Topics Concern    Not on file   Social History Narrative    Not on file     Social Determinants of Health     Financial Resource Strain:     Difficulty of Paying Living Expenses: Not on file   Food Insecurity:     Worried About [address] in the Last Year: Not on file    Dixon of Food in the Last Year: Not on file   Transportation Needs:     Lack of Transportation (Medical): Not on file    Lack of Transportation (Non-Medical):  Not on file   Physical Activity:     Days of Exercise per Week: Not on file    Minutes of Exercise per Session: Not on file   Stress:     Feeling of Stress : Not on file   Social Connections:     Frequency of Communication with Friends and Family: Not on file    Frequency of Social Gatherings with Friends and Family: Not on file    Attends Restoration Services: Not on file    Active Member of Clubs or Organizations: Not on file    Attends Club or Organization Meetings: Not on file    Marital Status: Not on file Intimate Partner Violence:     Fear of Current or Ex-Partner: Not on file    Emotionally Abused: Not on file    Physically Abused: Not on file    Sexually Abused: Not on file   Housing Stability:     Unable to Pay for Housing in the Last Year: Not on file    Number of Amy in the Last Year: Not on file    Unstable Housing in the Last Year: Not on file     No current facility-administered medications for this encounter. Current Outpatient Medications   Medication Sig Dispense Refill    oxybutynin (DITROPAN-XL) 5 MG extended release tablet Take 1 tablet by mouth daily for 7 days 7 tablet 0    amLODIPine (NORVASC) 5 MG tablet Take 1 tablet by mouth daily 30 tablet 0    lisinopril (PRINIVIL;ZESTRIL) 5 MG tablet Take 1 tablet by mouth daily 30 tablet 2    Insulin Detemir (LEVEMIR SC) Inject 40 Units into the skin In the morning      Insulin Detemir (LEVEMIR FLEXPEN SC) Inject 10 Units into the skin nightly      docusate sodium (COLACE, DULCOLAX) 100 MG CAPS Take 100 mg by mouth 2 times daily as needed for Constipation. 20 capsule 0    labetalol (NORMODYNE) 300 MG tablet Take 1 tablet by mouth 2 times daily. 60 tablet 5    glimepiride (AMARYL) 4 MG tablet Take 4 mg by mouth every morning (before breakfast). No Known Allergies    Nursing Notes Reviewed    Physical Exam:  Triage VS:    ED Triage Vitals   Enc Vitals Group      BP 11/16/21 1220 (!) 146/66      Pulse 11/16/21 1220 55      Resp 11/16/21 1220 20      Temp 11/16/21 1220 98.8 °F (37.1 °C)      Temp Source 11/16/21 1220 Oral      SpO2 11/16/21 1220 98 %      Weight 11/16/21 1219 200 lb (90.7 kg)      Height 11/16/21 1219 5' 8\" (1.727 m)      Head Circumference --       Peak Flow --       Pain Score --       Pain Loc --       Pain Edu? --       Excl. in [address]? --        My pulse ox interpretation is - normal    General appearance:  No acute distress. Skin:  Warm. Dry. Eye:  Extraocular movements intact.      Ears, nose, mouth and throat:  Oral mucosa moist   Neck:  Trachea midline. Extremity:  No swelling. Normal ROM     Heart:  Regular rate and rhythm, normal S1 & S2, no extra heart sounds. Perfusion:  intact  Respiratory:  Lungs clear to auscultation bilaterally. Respirations nonlabored. Abdominal:  Normal bowel sounds. Soft. Nontender. Non distended. Back:  No CVA tenderness to palpation     Neurological:  Alert and oriented times 3. No focal neuro deficits. Psychiatric:  Appropriate    I have reviewed and interpreted all of the currently available lab results from this visit (if applicable):  Results for orders placed or performed during the hospital encounter of 11/16/21   EKG 12 Lead   Result Value Ref Range    Ventricular Rate 55 BPM    Atrial Rate 55 BPM    P-R Interval 238 ms    QRS Duration 72 ms    Q-T Interval 432 ms    QTc Calculation (Bazett) 413 ms    P Axis 66 degrees    R Axis 8 degrees    T Axis 12 degrees    Diagnosis       Sinus bradycardia with 1st degree AV block  Otherwise normal ECG  When compared with ECG of 24-OCT-2020 21:11,  No significant change was found        Radiographs (if obtained):  Radiologist's Report Reviewed:  No results found. EKG (if obtained): (All EKG's are interpreted by myself in the absence of a cardiologist)  Sinus bradycardia with ventricular rate of 55, first-degree AV block with GA interval 238, QRS duration 72, QTc 413, no significant ischemic changes, similar to prior exam    MDM:    22-year-old male presenting with frequent falls at home. Patient states recently often when he stands up from a seated position will feel lightheaded and fall. Patient states he has not had any syncope or loss of consciousness. Patient presenting today with some mild bleeding over the left ear some mild ecchymosis over the left parietal region. Vitals on presentation reassuring and patient afebrile satting on room air. CT head and C-spine are nonacute.   Chest x-ray is nonacute as well. CBC is reassuring without leukocytosis. CMP reveals a creatinine of 1.9 elevated from baseline of 1.5. EKG is nonacute. Cristina Vivian is mildly elevated 0.029. Patient does have elevation in troponin at baseline but this is increased from patient's baseline. Rapid Covid is negative. UA still pending. Due to 3 falls over the past several days with significant lightheadedness will admit patient for further evaluation and treatment. Clinical Impression:  1. Lightheadedness    2. Frequent falls    3. Closed head injury, initial encounter          Comment: Please note this report has been produced using speech recognition software and may contain errors related to that system including errors in grammar, punctuation, and spelling, as well as words and phrases that may be inappropriate. Efforts were made to edit the dictations.         Amador Pizarro MD  11/19/21 [id number]

## 2021-11-16 NOTE — H&P
History and Physical      Name:  Puneet Rodriguez /Age/Sex: 1940  (78 y.o. male)   MRN & CSN:  [id number] & [id number] Admission Date/Time: 2021 12:17 PM   Location:  [place] [address] PCP: Fredy Benito MD       Hospital Day: 1    Assessment and Plan:   Puneet Rodriguez is a 78 y.o.  male  who presents with frequent falls at home     1. UTI  1. Indwelling mccrary cath, last change was 2021  2. Ua () shows protein, leuks, Rbc, Wbc, and bacteria  3. Rocephin started in Er~continue on admission  4. Culture pending   5. Consult urology~rec's appreciated   6. No leukocytosis      2. Frequent falls        1. States fell four times in the last week at home, feels his legs are giving out on him         2. Possibly 2/2 UTI        3. Consult Pt/Ot~rec's appreciated     3. Essential hypertension        1. Continue home medications        2. Hypertensive in Er         3. Monitor     4. Diabetes type 2        1. A1C (2018) 9.4        2. Hold oral medications        3. Continue lantus bid        4. A1C pending     5. Hyperlipidemia        1. Continue home statin     6. Hx of prostate cancer        1. Indwelling catheter        2. Consult urology for recurrent UTI's and long term mccrary     Diet No diet orders on file   DVT Prophylaxis [x] Lovenox, []  Heparin, [] SCDs, [] Ambulation   GI Prophylaxis [] PPI,  [] H2 Blocker,  [] Carafate,  [x] Diet/Tube Feeds   Code Status Prior   Disposition Patient requires continued admission due to UTI         History of Present Illness:     Chief Complaint: frequent falls   Puneet Rodriguez is a 78 y.o.  male  who presents with frequent falls in the last week. Pt states that he lives at home with his son and states that while walking this past week he feels as if his legs are giving out on him. Pt states that he has not noticed any changes in his urine except it looked like there was a cloud in it last week. Pt denies any fevers or chills.  Pt denies any nausea or vomiting. Pt denies any abdominal pain. Ten point ROS reviewed negative, unless as noted above    Objective:   No intake or output data in the 24 hours ending 11/16/21 1529   Vitals:   Vitals:    11/16/21 1501   BP: (!) 156/67   Pulse: 51   Resp:    Temp:    SpO2: 98%     Physical Exam:   GEN Awake male, sitting upright in bed in no apparent distress. Appears given age. EYES Pupils are equally round. No scleral erythema, discharge, or conjunctivitis. HENT Mucous membranes are moist. Oral pharynx without exudates, no evidence of thrush. NECK Supple, no apparent thyromegaly or masses. RESP Clear to auscultation, no wheezes, rales or rhonchi. Symmetric chest movement while on room air. CARDIO/VASC S1/S2 auscultated. Regular rate without appreciable murmurs, rubs, or gallops. No JVD or carotid bruits. Peripheral pulses equal bilaterally and palpable. No peripheral edema. GI Abdomen is soft without significant tenderness, masses, or guarding. Bowel sounds are normoactive. Rectal exam deferred.  No costovertebral angle tenderness. Hopper catheter is not present. HEME/LYMPH No palpable cervical lymphadenopathy and no hepatosplenomegaly. No petechiae or ecchymoses. MSK No gross joint deformities. SKIN Normal coloration, warm, dry. NEURO Cranial nerves appear grossly intact, normal speech, no lateralizing weakness. PSYCH Awake, alert, oriented x 4. Affect appropriate. Past Medical History:      Past Medical History:   Diagnosis Date    Acute urinary tract infection 3/15/2012    Ataxia 2010    Diabetes mellitus ([facility] [address] 2011    type 2, controlled    Fusion of spine of cervical region 10/2012    Gait disturbance 2011    History of prostate cancer 1996    adenocarcinoma    History of tobacco use 1959    Hyperlipidemia 2011    Osteoarthritis 2011     PSHX:  has a past surgical history that includes Prostate surgery; other surgical history (3/28/13);  Colon surgery; Bladder surgery; and Prostatectomy (). Allergies: No Known Allergies    FAM HX: family history includes Cancer in his brother, maternal uncle, and mother; Diabetes in his brother, father, maternal grandmother, and sister; Heart Disease in his father; High Blood Pressure in his father and sister; Stroke in his maternal grandfather. Soc HX:   Social History     Socioeconomic History    Marital status:      Spouse name: None    Number of children: 3    Years of education: None    Highest education level: None   Occupational History    None   Tobacco Use    Smoking status: Former Smoker     Packs/day: 0.50     Quit date: 1976     Years since quittin.4    Smokeless tobacco: Never Used   Vaping Use    Vaping Use: None   Substance and Sexual Activity    Alcohol use: No     Comment: Quit in     Drug use: No    Sexual activity: None   Other Topics Concern    None   Social History Narrative    None     Social Determinants of Health     Financial Resource Strain:     Difficulty of Paying Living Expenses: Not on file   Food Insecurity:     Worried About Running Out of Food in the Last Year: Not on file    Dixon of Food in the Last Year: Not on file   Transportation Needs:     Lack of Transportation (Medical): Not on file    Lack of Transportation (Non-Medical):  Not on file   Physical Activity:     Days of Exercise per Week: Not on file    Minutes of Exercise per Session: Not on file   Stress:     Feeling of Stress : Not on file   Social Connections:     Frequency of Communication with Friends and Family: Not on file    Frequency of Social Gatherings with Friends and Family: Not on file    Attends Bahai Services: Not on file    Active Member of Clubs or Organizations: Not on file    Attends Club or Organization Meetings: Not on file    Marital Status: Not on file   Intimate Partner Violence:     Fear of Current or Ex-Partner: Not on file    Emotionally Abused: Not on file    Physically Abused: Not on file    Sexually Abused: Not on file   Housing Stability:     Unable to Pay for Housing in the Last Year: Not on file    Number of Places Lived in the Last Year: Not on file    Unstable Housing in the Last Year: Not on file       Medications:   Medications:    sodium chloride  500 mL IntraVENous Once      Infusions:   PRN Meds:        Electronically signed by VIVIANA Luo CNP on 11/16/2021 at 3:29 PM

## 2021-11-17 PROBLEM — E11.69 DYSLIPIDEMIA DUE TO TYPE 2 DIABETES MELLITUS (HCC): Status: ACTIVE | Noted: 2021-11-17

## 2021-11-17 PROBLEM — R29.6 MULTIPLE FALLS: Status: ACTIVE | Noted: 2021-11-17

## 2021-11-17 PROBLEM — E78.5 DYSLIPIDEMIA DUE TO TYPE 2 DIABETES MELLITUS (HCC): Status: ACTIVE | Noted: 2021-11-17

## 2021-11-17 PROBLEM — R29.898 SEVERE MUSCLE DECONDITIONING: Status: ACTIVE | Noted: 2021-11-17

## 2021-11-17 PROBLEM — I10 UNCONTROLLED HYPERTENSION: Status: ACTIVE | Noted: 2021-11-17

## 2021-11-17 LAB
ALBUMIN SERPL-MCNC: 3.7 GM/DL (ref 3.4–5)
ALBUMIN SERPL-MCNC: 3.8 GM/DL (ref 3.4–5)
ALP BLD-CCNC: 98 IU/L (ref 40–128)
ALT SERPL-CCNC: 16 U/L (ref 10–40)
ANION GAP SERPL CALCULATED.3IONS-SCNC: 13 MMOL/L (ref 4–16)
ANION GAP SERPL CALCULATED.3IONS-SCNC: NORMAL MMOL/L (ref 4–16)
AST SERPL-CCNC: 18 IU/L (ref 15–37)
BASOPHILS ABSOLUTE: 0.1 K/CU MM
BASOPHILS RELATIVE PERCENT: 0.8 % (ref 0–1)
BILIRUB SERPL-MCNC: 0.3 MG/DL (ref 0–1)
BUN BLDV-MCNC: 30 MG/DL (ref 6–23)
BUN BLDV-MCNC: NORMAL MG/DL (ref 6–23)
CALCIUM SERPL-MCNC: 8.8 MG/DL (ref 8.3–10.6)
CALCIUM SERPL-MCNC: NORMAL MG/DL (ref 8.3–10.6)
CHLORIDE BLD-SCNC: 107 MMOL/L (ref 99–110)
CHLORIDE BLD-SCNC: NORMAL MMOL/L (ref 99–110)
CO2: 19 MMOL/L (ref 21–32)
CO2: NORMAL MMOL/L (ref 21–32)
CREAT SERPL-MCNC: 1.8 MG/DL (ref 0.9–1.3)
CREAT SERPL-MCNC: NORMAL MG/DL (ref 0.9–1.3)
DIFFERENTIAL TYPE: ABNORMAL
EKG ATRIAL RATE: 55 BPM
EKG DIAGNOSIS: NORMAL
EKG P AXIS: 66 DEGREES
EKG P-R INTERVAL: 238 MS
EKG Q-T INTERVAL: 432 MS
EKG QRS DURATION: 72 MS
EKG QTC CALCULATION (BAZETT): 413 MS
EKG R AXIS: 8 DEGREES
EKG T AXIS: 12 DEGREES
EKG VENTRICULAR RATE: 55 BPM
EOSINOPHILS ABSOLUTE: 0.5 K/CU MM
EOSINOPHILS RELATIVE PERCENT: 7.4 % (ref 0–3)
ESTIMATED AVERAGE GLUCOSE: 220 MG/DL
GFR AFRICAN AMERICAN: 44 ML/MIN/1.73M2
GFR AFRICAN AMERICAN: NORMAL ML/MIN/1.73M2
GFR NON-AFRICAN AMERICAN: 36 ML/MIN/1.73M2
GFR NON-AFRICAN AMERICAN: NORMAL ML/MIN/1.73M2
GLUCOSE BLD-MCNC: 180 MG/DL (ref 70–99)
GLUCOSE BLD-MCNC: 201 MG/DL (ref 70–99)
GLUCOSE BLD-MCNC: 215 MG/DL (ref 70–99)
GLUCOSE BLD-MCNC: 247 MG/DL (ref 70–99)
GLUCOSE BLD-MCNC: 287 MG/DL (ref 70–99)
GLUCOSE BLD-MCNC: NORMAL MG/DL (ref 70–99)
HBA1C MFR BLD: 9.3 % (ref 4.2–6.3)
HCT VFR BLD CALC: 33.2 % (ref 42–52)
HEMOGLOBIN: 10.1 GM/DL (ref 13.5–18)
IMMATURE NEUTROPHIL %: 0.2 % (ref 0–0.43)
LYMPHOCYTES ABSOLUTE: 1.9 K/CU MM
LYMPHOCYTES RELATIVE PERCENT: 28.6 % (ref 24–44)
MCH RBC QN AUTO: 29 PG (ref 27–31)
MCHC RBC AUTO-ENTMCNC: 30.4 % (ref 32–36)
MCV RBC AUTO: 95.4 FL (ref 78–100)
MONOCYTES ABSOLUTE: 0.6 K/CU MM
MONOCYTES RELATIVE PERCENT: 9.5 % (ref 0–4)
NUCLEATED RBC %: 0 %
PDW BLD-RTO: 16.8 % (ref 11.7–14.9)
PHOSPHORUS: 3.6 MG/DL (ref 2.5–4.9)
PLATELET # BLD: 215 K/CU MM (ref 140–440)
PMV BLD AUTO: 11.3 FL (ref 7.5–11.1)
POTASSIUM SERPL-SCNC: 4.5 MMOL/L (ref 3.5–5.1)
POTASSIUM SERPL-SCNC: NORMAL MMOL/L (ref 3.5–5.1)
RBC # BLD: 3.48 M/CU MM (ref 4.6–6.2)
SEGMENTED NEUTROPHILS ABSOLUTE COUNT: 3.6 K/CU MM
SEGMENTED NEUTROPHILS RELATIVE PERCENT: 53.5 % (ref 36–66)
SODIUM BLD-SCNC: 139 MMOL/L (ref 135–145)
SODIUM BLD-SCNC: NORMAL MMOL/L (ref 135–145)
TOTAL IMMATURE NEUTOROPHIL: 0.01 K/CU MM
TOTAL NUCLEATED RBC: 0 K/CU MM
TOTAL PROTEIN: 6.6 GM/DL (ref 6.4–8.2)
WBC # BLD: 6.6 K/CU MM (ref 4–10.5)

## 2021-11-17 PROCEDURE — 96366 THER/PROPH/DIAG IV INF ADDON: CPT

## 2021-11-17 PROCEDURE — 97530 THERAPEUTIC ACTIVITIES: CPT

## 2021-11-17 PROCEDURE — 85025 COMPLETE CBC W/AUTO DIFF WBC: CPT

## 2021-11-17 PROCEDURE — 2580000003 HC RX 258: Performed by: FAMILY MEDICINE

## 2021-11-17 PROCEDURE — 82962 GLUCOSE BLOOD TEST: CPT

## 2021-11-17 PROCEDURE — 6370000000 HC RX 637 (ALT 250 FOR IP): Performed by: PHYSICIAN ASSISTANT

## 2021-11-17 PROCEDURE — G0378 HOSPITAL OBSERVATION PER HR: HCPCS

## 2021-11-17 PROCEDURE — 80053 COMPREHEN METABOLIC PANEL: CPT

## 2021-11-17 PROCEDURE — 93010 ELECTROCARDIOGRAM REPORT: CPT | Performed by: INTERNAL MEDICINE

## 2021-11-17 PROCEDURE — 83036 HEMOGLOBIN GLYCOSYLATED A1C: CPT

## 2021-11-17 PROCEDURE — 97163 PT EVAL HIGH COMPLEX 45 MIN: CPT

## 2021-11-17 PROCEDURE — 83735 ASSAY OF MAGNESIUM: CPT

## 2021-11-17 PROCEDURE — 6370000000 HC RX 637 (ALT 250 FOR IP): Performed by: INTERNAL MEDICINE

## 2021-11-17 PROCEDURE — 97166 OT EVAL MOD COMPLEX 45 MIN: CPT

## 2021-11-17 PROCEDURE — 94761 N-INVAS EAR/PLS OXIMETRY MLT: CPT

## 2021-11-17 PROCEDURE — 97535 SELF CARE MNGMENT TRAINING: CPT

## 2021-11-17 PROCEDURE — 97116 GAIT TRAINING THERAPY: CPT

## 2021-11-17 PROCEDURE — 6370000000 HC RX 637 (ALT 250 FOR IP): Performed by: FAMILY MEDICINE

## 2021-11-17 PROCEDURE — 96372 THER/PROPH/DIAG INJ SC/IM: CPT

## 2021-11-17 PROCEDURE — 6360000002 HC RX W HCPCS: Performed by: FAMILY MEDICINE

## 2021-11-17 PROCEDURE — 80069 RENAL FUNCTION PANEL: CPT

## 2021-11-17 PROCEDURE — 36415 COLL VENOUS BLD VENIPUNCTURE: CPT

## 2021-11-17 RX ORDER — INSULIN GLARGINE 100 [IU]/ML
10 INJECTION, SOLUTION SUBCUTANEOUS NIGHTLY
Status: DISCONTINUED | OUTPATIENT
Start: 2021-11-17 | End: 2021-11-18 | Stop reason: HOSPADM

## 2021-11-17 RX ORDER — NICOTINE POLACRILEX 4 MG
15 LOZENGE BUCCAL PRN
Status: DISCONTINUED | OUTPATIENT
Start: 2021-11-17 | End: 2021-11-17 | Stop reason: SDUPTHER

## 2021-11-17 RX ORDER — AMLODIPINE BESYLATE 10 MG/1
10 TABLET ORAL DAILY
Status: DISCONTINUED | OUTPATIENT
Start: 2021-11-17 | End: 2021-11-18 | Stop reason: HOSPADM

## 2021-11-17 RX ORDER — HYDRALAZINE HYDROCHLORIDE 25 MG/1
25 TABLET, FILM COATED ORAL EVERY 8 HOURS SCHEDULED
Status: DISCONTINUED | OUTPATIENT
Start: 2021-11-17 | End: 2021-11-18 | Stop reason: HOSPADM

## 2021-11-17 RX ORDER — DEXTROSE MONOHYDRATE 25 G/50ML
12.5 INJECTION, SOLUTION INTRAVENOUS PRN
Status: DISCONTINUED | OUTPATIENT
Start: 2021-11-17 | End: 2021-11-17 | Stop reason: SDUPTHER

## 2021-11-17 RX ORDER — ALOGLIPTIN 12.5 MG/1
6.25 TABLET, FILM COATED ORAL DAILY
Status: DISCONTINUED | OUTPATIENT
Start: 2021-11-17 | End: 2021-11-18 | Stop reason: HOSPADM

## 2021-11-17 RX ORDER — DEXTROSE MONOHYDRATE 50 MG/ML
100 INJECTION, SOLUTION INTRAVENOUS PRN
Status: DISCONTINUED | OUTPATIENT
Start: 2021-11-17 | End: 2021-11-18 | Stop reason: HOSPADM

## 2021-11-17 RX ORDER — NICOTINE POLACRILEX 4 MG
15 LOZENGE BUCCAL PRN
Status: DISCONTINUED | OUTPATIENT
Start: 2021-11-17 | End: 2021-11-18 | Stop reason: HOSPADM

## 2021-11-17 RX ORDER — DEXTROSE MONOHYDRATE 25 G/50ML
12.5 INJECTION, SOLUTION INTRAVENOUS PRN
Status: DISCONTINUED | OUTPATIENT
Start: 2021-11-17 | End: 2021-11-18 | Stop reason: HOSPADM

## 2021-11-17 RX ORDER — DEXTROSE MONOHYDRATE 50 MG/ML
100 INJECTION, SOLUTION INTRAVENOUS PRN
Status: DISCONTINUED | OUTPATIENT
Start: 2021-11-17 | End: 2021-11-17 | Stop reason: SDUPTHER

## 2021-11-17 RX ADMIN — LABETALOL HYDROCHLORIDE 100 MG: 200 TABLET, FILM COATED ORAL at 20:57

## 2021-11-17 RX ADMIN — AMLODIPINE BESYLATE 10 MG: 10 TABLET ORAL at 09:57

## 2021-11-17 RX ADMIN — CEFTRIAXONE 1000 MG: 1 INJECTION, POWDER, FOR SOLUTION INTRAMUSCULAR; INTRAVENOUS at 12:45

## 2021-11-17 RX ADMIN — HYDRALAZINE HYDROCHLORIDE 25 MG: 25 TABLET, FILM COATED ORAL at 20:57

## 2021-11-17 RX ADMIN — ENOXAPARIN SODIUM 40 MG: 100 INJECTION SUBCUTANEOUS at 20:56

## 2021-11-17 RX ADMIN — ALOGLIPTIN 6.25 MG: 12.5 TABLET, FILM COATED ORAL at 10:23

## 2021-11-17 RX ADMIN — HYDRALAZINE HYDROCHLORIDE 25 MG: 25 TABLET, FILM COATED ORAL at 10:15

## 2021-11-17 RX ADMIN — LABETALOL HYDROCHLORIDE 100 MG: 200 TABLET, FILM COATED ORAL at 09:56

## 2021-11-17 RX ADMIN — ACETAMINOPHEN 650 MG: 325 TABLET ORAL at 12:23

## 2021-11-17 RX ADMIN — OXYBUTYNIN CHLORIDE 5 MG: 5 TABLET, EXTENDED RELEASE ORAL at 20:57

## 2021-11-17 RX ADMIN — SODIUM CHLORIDE 25 ML: 9 INJECTION, SOLUTION INTRAVENOUS at 12:44

## 2021-11-17 RX ADMIN — INSULIN GLARGINE 10 UNITS: 100 INJECTION, SOLUTION SUBCUTANEOUS at 20:55

## 2021-11-17 RX ADMIN — SODIUM CHLORIDE, PRESERVATIVE FREE 10 ML: 5 INJECTION INTRAVENOUS at 09:56

## 2021-11-17 RX ADMIN — SODIUM CHLORIDE, PRESERVATIVE FREE 10 ML: 5 INJECTION INTRAVENOUS at 20:56

## 2021-11-17 ASSESSMENT — PAIN DESCRIPTION - ONSET: ONSET: ON-GOING

## 2021-11-17 ASSESSMENT — PAIN DESCRIPTION - FREQUENCY: FREQUENCY: INTERMITTENT

## 2021-11-17 ASSESSMENT — PAIN DESCRIPTION - DESCRIPTORS: DESCRIPTORS: ACHING

## 2021-11-17 ASSESSMENT — PAIN DESCRIPTION - ORIENTATION: ORIENTATION: LEFT

## 2021-11-17 ASSESSMENT — PAIN DESCRIPTION - PAIN TYPE: TYPE: CHRONIC PAIN

## 2021-11-17 ASSESSMENT — PAIN DESCRIPTION - PROGRESSION: CLINICAL_PROGRESSION: NOT CHANGED

## 2021-11-17 ASSESSMENT — PAIN SCALES - GENERAL
PAINLEVEL_OUTOF10: 6
PAINLEVEL_OUTOF10: 7
PAINLEVEL_OUTOF10: 5

## 2021-11-17 ASSESSMENT — PAIN DESCRIPTION - LOCATION: LOCATION: LEG

## 2021-11-17 ASSESSMENT — PAIN - FUNCTIONAL ASSESSMENT: PAIN_FUNCTIONAL_ASSESSMENT: PREVENTS OR INTERFERES SOME ACTIVE ACTIVITIES AND ADLS

## 2021-11-17 NOTE — CARE COORDINATION
Chart reviewed and met w/ pt to initiate discharge planning. CM introduced self and explained role. Pt is from home w/ his son. Pt states he is active with [facility] for nursing, PT and speech therapies. Pt has PCP and insurance with RX coverage. CM asked pt about frequent falls per charting, pt states he has fallen almost everyday last week. CM discussed therapy consults for today and asked pt if SNF recommended would he be open. Pt states he would be if needed. Pt would like first choice to be [facility] w/ [facility], second ARU and third [place]. CM advised pt that CM will follow up with him after therapy recommendations available. 4:27 PM Therapy recommendations noted. [facility].

## 2021-11-17 NOTE — CONSULTS
[address] [facility],[address] ACUTE CARE OCCUPATIONAL THERAPY EVALUATION    Monica Hull, 1940, [id number], 11/17/2021    Discharge Recommendation: Inpatient Rehabilitation      History:  Cheesh-Na:  There were no encounter diagnoses. Subjective:  Patient states: Agreeable to therapy. Pain: Pt denied pain this date (0/10). Communication with other providers: PT, RN, LSW  Restrictions: General Precautions, Fall Risk    Home Setup/Prior level of function:  Social/Functional History  Lives With: Son  Type of Home: House  Home Layout: One level  Home Access: Stairs to enter without rails  Entrance Stairs - Number of Steps: 2  Bathroom Shower/Tub: Tub/Shower unit  Bathroom Toilet: Standard  Bathroom Equipment: Grab bars in shower, Shower chair  Home Equipment: Cane, Rolling walker, 4 wheeled walker  ADL Assistance: Independent  Homemaking Assistance: Independent  Ambulation Assistance: Independent (mod I with 4ww)  Transfer Assistance: Independent    Examination:  · Observation: Supine in bed upon arrival  · Vision: [employer]  · [facility]  · Vitals: Stable vitals throughout session    Body Systems and functions:  · ROM: WFL   · Strength: WFL  · Sensation: WFL  · Tone: Normal  · Coordination: WFL  · Perception: WNL    Activities of Daily Living (ADLs):  · Feeding: Skip  setup and increased time. · Grooming: CGA in standing with CGA for dynamic standing balance, setup, increased time, VC.   · UB bathing: SBA in seated with setup, increased time, VC and assistance d/t decreased activity tolerance for task this date. · LB bathing: ModA in seated with setup, increased time, VC, and assistance for distal reaching. · UB dressing: SBA in seated with setup, increased time, VC.   · LB dressing: DEP pt required assistance to kang socks while seated upright at EOB this date. Pt required assistance to doff depend in standing, thread depend in seated, and manage depend in standing.    · Toileting: ModA pt required assistance during commode transfers, doffing depend in standing, threading depend in seated, and managing depend in standing this date. Cognitive and Psychosocial Functioning:  · Overall cognitive status: Oriented to time, date, person, place, city  · Affect: Normal     Balance:   · Sitting: SBA (pt sat EOB demo fair+ dynamic sitting balance). · Standing: CGA - Pippa (pt stood with RW. Demo fair dynamic standing balance with forward flexed posture related to fatigue). Functional Mobility:   · Bed Mobility: Pippa (pt performed supine to seated bed mobility with assist for trunk support, and VC for sequencing throughout). · Transfers: Pippa (pt performed STS from EOB with RW. Required VC throughout for sequencing and increased time). · Ambulation: CGA - Pippa (pt ambulated approx 10ft x 2 with RW. Demo slow pace throughout. Decreased tolerance for ambulation this date related to fatigue). AM-PAC 6 click short form for inpatient daily activity:   How much help from another person does the patient currently need. .. Unable  Dep A Lot  Max A A Lot   Mod A A Little  Min A A Little   CGA  SBA None   Mod I  Indep  Sup   1. Putting on and taking off regular lower body clothing? [x] 1    [] 2   [] 2   [] 3   [] 3   [] 4      2. Bathing (including washing, rinsing, drying)? [] 1   [] 2   [x] 2 [] 3 [] 3 [] 4   3. Toileting, which includes using toilet, bedpan, or urinal? [] 1    [] 2   [x] 2   [] 3   [] 3   [] 4     4. Putting on and taking off regular upper body clothing? [] 1   [] 2   [] 2   [] 3   [x] 3    [] 4      5. Taking care of personal grooming such as brushing teeth? [] 1   [] 2    [] 2 [] 3    [x] 3   [] 4      6. Eating meals?    [] 1   [] 2   [] 2   [] 3   [] 3   [x] 4      Raw Score:  15     [24=0% impaired(CH), 23=1-19%(CI), 20-22=20-39%(CJ), 15-19=40-59%(CK), 10-14=60-79%(CL), 7-9=80-99%(CM), 6=100%(CN)]    Treatment:  Therapeutic Activity Training:   Therapeutic activity training was instructed today.  Cues were given for safety, sequence, UE/LE placement, awareness, and balance. Activities performed today included bed mobility training, sup-sit, sit-stand, ambulation. Self Care Training:   Cues were given for safety, sequence, UE/LE placement, visual cues, and balance. Activities performed today included dressing, toileting. Safety Measures: Gait belt used, Left in chair, Alarm in place    Assessment:  Assessment  Performance deficits / Impairments: Decreased functional mobility , Decreased ADL status, Decreased endurance, Decreased balance, Decreased high-level IADLs, Decreased posture  Treatment Diagnosis: UTI  Prognosis: Good  Decision Making: Medium Complexity  REQUIRES OT FOLLOW UP: Yes  Discharge Recommendations: IP Rehab    Pt is an 78year old M admitted for UTI. Pt reports that prior to admission he was IND in ADLs, IADLs, and Skip for functional mobility. This date, pt demo decreased strength, activity tolerance, endurance, and overall functional mobility impacting ADL status. Pt is currently functioning below baseline and would benefit from skilled OT services at ARU. Plan:  Plan  Times per week: 2+  Times per day: Daily      Goals:  1. Pt will complete all aspects of bed mobility for EOB/OOB ADLs CGA. 2. Pt will complete LB bathing with ModA and AE as needed. 3. Pt will complete all aspects of LB dressing with ModA and AE as needed. 4. Pt will complete all functional transfers to and from bed, chair, toilet, shower chair with SBA and AD. 5. Pt will ambulate HH distance to bathroom for toileting with SBA and A.  6. Pt will complete all aspects of toileting task with Pippa. 7. Pt will complete oral hygiene/grooming routine in standing at sink with SUP demo good dynamic standing balance for approx 8 minutes. 8. Pt will complete ther ex/ther act with focus on UB strengthening.     Time:   Time in: 1125  Time out: 1200  Timed treatment minutes: 25  Total time: 35      Electronically signed by:       EMA Vernon/L, [address] [facility], [id number]

## 2021-11-17 NOTE — PROGRESS NOTES
pain, SOB. States he has a little lower abdominal discomfort. We discussed plan of care and he is agreeable. Ten point ROS reviewed negative, unless as noted above    Objective: Intake/Output Summary (Last 24 hours) at 11/17/2021 0749  Last data filed at 11/16/2021 2000  Gross per 24 hour   Intake --   Output 400 ml   Net -400 ml      Vitals:   Vitals:    11/17/21 0744   BP: (!) 151/62   Pulse: 62   Resp:    Temp: 98.8 °F (37.1 °C)   SpO2: 97%     Physical Exam:     GEN Awake male, sitting upright in bed in no apparent distress. Appears given age. EYES Pupils are equally round. No scleral erythema, discharge, or conjunctivitis. HENT Mucous membranes are moist.  NECK Supple, no apparent thyromegaly or masses. RESP Clear to auscultation, no wheezes, rales or rhonchi. Respirations even and unlabored on RA. CARDIO/VASC   S1/S2 auscultated. Regular rate without appreciable murmurs, rubs, or gallops. Peripheral pulses equal bilaterally and palpable. No peripheral edema. GI Abdomen is soft without significant tenderness, masses, or guarding. Bowel sounds are normoactive.  SP catheter present. MSK No gross joint deformities. SKIN Normal coloration, warm, dry. NEURO Cranial nerves appear grossly intact, normal speech, no lateralizing weakness. PSYCH Awake, alert, oriented x 4. Affect appropriate.     Medications:   Medications:    amLODIPine  5 mg Oral Daily    lisinopril  5 mg Oral Daily    oxybutynin  5 mg Oral Nightly    cefTRIAXone (ROCEPHIN) IV  1,000 mg IntraVENous Q24H    sodium chloride flush  5-40 mL IntraVENous 2 times per day    enoxaparin  40 mg SubCUTAneous Nightly    labetalol  200 mg Oral 2 times per day    Or    labetalol  100 mg Oral 2 times per day      Infusions:    sodium chloride       PRN Meds: docusate sodium, 100 mg, BID PRN  sodium chloride flush, 5-40 mL, PRN  sodium chloride, 25 mL, PRN  ondansetron, 4 mg, Q8H PRN   Or  ondansetron, 4 mg, Q6H PRN  polyethylene glycol, 17 g, Daily PRN  acetaminophen, 650 mg, Q6H PRN   Or  acetaminophen, 650 mg, Q6H PRN          Electronically signed by Monique Mcdermott PA-C on 11/17/2021 at 7:49 AM

## 2021-11-17 NOTE — PROGRESS NOTES
Physical Therapy    Facility/Department: [address] [place] [address]  Initial Assessment    NAME: Arnaldo Alexander  : 1940  MRN: [id number]    Date of Service: 2021    Discharge Recommendations:  IP Rehab        Assessment   Assessment: Pt is an 78 y.o. male with medical history, surgical history, co-morbidities, and personal factors including Acute urinary tract infection, Ataxia, Diabetes mellitus ([address] Fusion of spine of cervical region, Gait disturbance, History of prostate cancer, History of tobacco use, Hyperlipidemia, Osteoarthritis, Prostate surgery; other surgical history (3/28/13); Colon surgery; Bladder surgery; and Prostatectomy () with admission for UTI and frequent falls. Prior to admission, pt was modified independent with functional mobility and ADLs. Examination of body systems reveals decreased strength, decreased balance, decreased aerobic capacity, and decreased independence with functional mobility. Prognosis: Good  Decision Making: High Complexity  Clinical Presentation: unpredictable characteristics  PT Education: Goals; PT Role; Plan of Care; Home Exercise Program; Equipment; Functional Mobility Training; Transfer Training; Gait Training; General Safety;  Adaptive Device Training  REQUIRES PT FOLLOW UP: Yes  Activity Tolerance  Activity Tolerance: Patient Tolerated treatment well         Restrictions  Restrictions/Precautions  Restrictions/Precautions: General Precautions, Fall Risk  Vision/Hearing  Vision: Within Functional Limits  Hearing: Within functional limits     Subjective  General  Chart Reviewed: Yes  Patient assessed for rehabilitation services?: Yes  Family / Caregiver Present: Yes  Follows Commands: Within Functional Limits  Pain Screening  Patient Currently in Pain: Denies  Vital Signs  Patient Currently in Pain: Denies       Orientation  Orientation  Overall Orientation Status: Within Normal Limits  Social/Functional History  Social/Functional History  Lives With: Son  Type of Home: House  Home Layout: One level  Home Access: Stairs to enter without rails  Entrance Stairs - Number of Steps: 2  Bathroom Shower/Tub: Tub/Shower unit  Bathroom Toilet: Standard  Bathroom Equipment: Grab bars in shower, Shower chair  Home Equipment: Cane, Rolling walker, 4 wheeled walker  ADL Assistance: [address]: Independent  Ambulation Assistance: Independent (mod I with 4ww)  Transfer Assistance: Independent  Cognition   Cognition  Overall Cognitive Status: Exceptions  Arousal/Alertness: Appropriate responses to stimuli  Following Commands:  Follows all commands without difficulty  Attention Span: Appears intact  Safety Judgement: Decreased awareness of need for safety; Decreased awareness of need for assistance  Problem Solving: Decreased awareness of errors  Insights: Decreased awareness of deficits  Initiation: Requires cues for some  Sequencing: Requires cues for some    Objective             Strength RLE  Comment: knee extension: 4-/5, ankle DF: 4/5  Strength LLE  Comment: knee extension: 4-/5, ankle DF: 4/5     Sensation  Overall Sensation Status: WNL  Bed mobility  Supine to Sit: Minimal assistance (with verbal and tactile cues for BUE and BLE placement throughout transfer; with HOB elevated; with increased time for task completion)  Transfers  Sit to Stand: Minimal Assistance (with verbal cues to push through bed/chair/grab bar and avoid pulling on walker)  Stand to sit: Minimal Assistance (with verbal cues to feel chair/toilet against back of legs, reach back, and sit slowly)  Ambulation  Ambulation?: Yes  Ambulation 1  Surface: level tile  Device: Rolling Walker  Assistance: Minimal assistance  Quality of Gait: decreased gait speed, decreased step length bilaterally, shuffling gait, unsteady gait  Distance: 10 feet + 10 feet  Comments: with verbal and tactile cues for BLE placement, walker placement, and sequence throughout ambulation; with verbal and tactile cues to maintain upright posture in order to avoid COM shifting outside of TYSHAWN     Balance  Posture: Fair  Sitting - Static: Good  Sitting - Dynamic: Good  Standing - Static: Poor; +  Standing - Dynamic: Poor; +      Gait Training:  Cues were given for safety, sequence, device management, balance, posture, awareness, path. Therapeutic Activity Training:   Therapeutic activity training was instructed today. Cues were given for safety, sequence, UE/LE placement, awareness, and balance. Activities performed today included bed mobility training, sup-sit, sit-stand. Plan   Plan  Times per week: 3+  Current Treatment Recommendations: Strengthening, ROM, Balance Training, Functional Mobility Training, Transfer Training, ADL/Self-care Training, Gait Training, Patient/Caregiver Education & Training, IADL Training, Stair training, Equipment Evaluation, Education, & procurement, Neuromuscular Re-education, Pain Management, Home Exercise Program, Positioning, Endurance Training, Safety Education & Training, Cognitive/Perceptual Training  Safety Devices  Type of devices: All fall risk precautions in place, Left in chair, Call light within reach, Chair alarm in place, Nurse notified, Gait belt, Patient at risk for falls      AM-PAC Score  AM-PAC Inpatient Mobility Raw Score : 16 (11/17/21 1402)  AM-PAC Inpatient T-Scale Score : 40.78 (11/17/21 1402)  Mobility Inpatient CMS 0-100% Score: 54.16 (11/17/21 1402)  Mobility Inpatient CMS G-Code Modifier : CK (11/17/21 1402)          Goals  Long term goals  Time Frame for Long term goals :  In one week:  Long term goal 1: Pt will complete all bed mobility with SBAx1  Long term goal 2: Pt will complete sit <> stand transfers with SBAx1  Long term goal 3: Pt will ambulate 100 feet with SBAx1 with LRAD  Long term goal 4: Pt will ascend/descend 2 steps without a handrail with minAx1  Long term goal 5: Pt will independently complete 3 sets of 10 reps of BLE AROM exercises in available and allowed ROM       Time In: 1125  Time Out: 1200  Total Treatment Time:  35  Timed Code Treatment Minutes: [address] [facility] Chintan Guadalupe PT, DPT  License #: [id number]

## 2021-11-18 ENCOUNTER — HOSPITAL ENCOUNTER (INPATIENT)
Age: 81
LOS: 10 days | Discharge: HOME HEALTH | DRG: 948 | End: 2021-11-28
Attending: PHYSICAL MEDICINE & REHABILITATION | Admitting: PHYSICAL MEDICINE & REHABILITATION
Payer: MEDICARE

## 2021-11-18 VITALS
TEMPERATURE: 97.3 F | WEIGHT: 200 LBS | RESPIRATION RATE: 18 BRPM | HEIGHT: 68 IN | HEART RATE: 64 BPM | DIASTOLIC BLOOD PRESSURE: 59 MMHG | BODY MASS INDEX: 30.31 KG/M2 | SYSTOLIC BLOOD PRESSURE: 159 MMHG | OXYGEN SATURATION: 99 %

## 2021-11-18 LAB
ALBUMIN SERPL-MCNC: 3.6 GM/DL (ref 3.4–5)
ANION GAP SERPL CALCULATED.3IONS-SCNC: 11 MMOL/L (ref 4–16)
BASOPHILS ABSOLUTE: 0 K/CU MM
BASOPHILS RELATIVE PERCENT: 0.6 % (ref 0–1)
BUN BLDV-MCNC: 30 MG/DL (ref 6–23)
CALCIUM SERPL-MCNC: 9 MG/DL (ref 8.3–10.6)
CHLORIDE BLD-SCNC: 104 MMOL/L (ref 99–110)
CO2: 22 MMOL/L (ref 21–32)
CREAT SERPL-MCNC: 1.9 MG/DL (ref 0.9–1.3)
CULTURE: ABNORMAL
DIFFERENTIAL TYPE: ABNORMAL
EOSINOPHILS ABSOLUTE: 0.5 K/CU MM
EOSINOPHILS RELATIVE PERCENT: 7.8 % (ref 0–3)
GFR AFRICAN AMERICAN: 41 ML/MIN/1.73M2
GFR NON-AFRICAN AMERICAN: 34 ML/MIN/1.73M2
GLUCOSE BLD-MCNC: 209 MG/DL (ref 70–99)
GLUCOSE BLD-MCNC: 215 MG/DL (ref 70–99)
GLUCOSE BLD-MCNC: 241 MG/DL (ref 70–99)
GLUCOSE BLD-MCNC: 242 MG/DL (ref 70–99)
GLUCOSE BLD-MCNC: 287 MG/DL (ref 70–99)
HCT VFR BLD CALC: 31.4 % (ref 42–52)
HEMOGLOBIN: 9.6 GM/DL (ref 13.5–18)
IMMATURE NEUTROPHIL %: 0.2 % (ref 0–0.43)
LYMPHOCYTES ABSOLUTE: 2 K/CU MM
LYMPHOCYTES RELATIVE PERCENT: 30.6 % (ref 24–44)
Lab: ABNORMAL
MAGNESIUM: 2 MG/DL (ref 1.8–2.4)
MCH RBC QN AUTO: 28.7 PG (ref 27–31)
MCHC RBC AUTO-ENTMCNC: 30.6 % (ref 32–36)
MCV RBC AUTO: 94 FL (ref 78–100)
MONOCYTES ABSOLUTE: 0.7 K/CU MM
MONOCYTES RELATIVE PERCENT: 10.5 % (ref 0–4)
NUCLEATED RBC %: 0 %
PDW BLD-RTO: 16.8 % (ref 11.7–14.9)
PHOSPHORUS: 4 MG/DL (ref 2.5–4.9)
PLATELET # BLD: 199 K/CU MM (ref 140–440)
PMV BLD AUTO: 12.1 FL (ref 7.5–11.1)
POTASSIUM SERPL-SCNC: 4.6 MMOL/L (ref 3.5–5.1)
RBC # BLD: 3.34 M/CU MM (ref 4.6–6.2)
SEGMENTED NEUTROPHILS ABSOLUTE COUNT: 3.3 K/CU MM
SEGMENTED NEUTROPHILS RELATIVE PERCENT: 50.3 % (ref 36–66)
SODIUM BLD-SCNC: 137 MMOL/L (ref 135–145)
SPECIMEN: ABNORMAL
TOTAL IMMATURE NEUTOROPHIL: 0.01 K/CU MM
TOTAL NUCLEATED RBC: 0 K/CU MM
WBC # BLD: 6.5 K/CU MM (ref 4–10.5)

## 2021-11-18 PROCEDURE — 83735 ASSAY OF MAGNESIUM: CPT

## 2021-11-18 PROCEDURE — 36415 COLL VENOUS BLD VENIPUNCTURE: CPT

## 2021-11-18 PROCEDURE — 2580000003 HC RX 258: Performed by: NURSE PRACTITIONER

## 2021-11-18 PROCEDURE — 6370000000 HC RX 637 (ALT 250 FOR IP): Performed by: FAMILY MEDICINE

## 2021-11-18 PROCEDURE — 6360000002 HC RX W HCPCS: Performed by: NURSE PRACTITIONER

## 2021-11-18 PROCEDURE — 96367 TX/PROPH/DG ADDL SEQ IV INF: CPT

## 2021-11-18 PROCEDURE — 1280000000 HC REHAB R&B

## 2021-11-18 PROCEDURE — G0378 HOSPITAL OBSERVATION PER HR: HCPCS

## 2021-11-18 PROCEDURE — 85025 COMPLETE CBC W/AUTO DIFF WBC: CPT

## 2021-11-18 PROCEDURE — 6370000000 HC RX 637 (ALT 250 FOR IP): Performed by: PHYSICAL MEDICINE & REHABILITATION

## 2021-11-18 PROCEDURE — 6370000000 HC RX 637 (ALT 250 FOR IP): Performed by: NURSE PRACTITIONER

## 2021-11-18 PROCEDURE — 99223 1ST HOSP IP/OBS HIGH 75: CPT | Performed by: PHYSICAL MEDICINE & REHABILITATION

## 2021-11-18 PROCEDURE — 97530 THERAPEUTIC ACTIVITIES: CPT

## 2021-11-18 PROCEDURE — 2580000003 HC RX 258: Performed by: FAMILY MEDICINE

## 2021-11-18 PROCEDURE — 6370000000 HC RX 637 (ALT 250 FOR IP): Performed by: INTERNAL MEDICINE

## 2021-11-18 PROCEDURE — 80069 RENAL FUNCTION PANEL: CPT

## 2021-11-18 PROCEDURE — 94761 N-INVAS EAR/PLS OXIMETRY MLT: CPT

## 2021-11-18 PROCEDURE — 97116 GAIT TRAINING THERAPY: CPT

## 2021-11-18 PROCEDURE — 82962 GLUCOSE BLOOD TEST: CPT

## 2021-11-18 RX ORDER — ALOGLIPTIN 12.5 MG/1
6.25 TABLET, FILM COATED ORAL DAILY
Status: CANCELLED | OUTPATIENT
Start: 2021-11-18

## 2021-11-18 RX ORDER — ACETAMINOPHEN 650 MG/1
650 SUPPOSITORY RECTAL EVERY 6 HOURS PRN
Status: DISCONTINUED | OUTPATIENT
Start: 2021-11-18 | End: 2021-11-18

## 2021-11-18 RX ORDER — NICOTINE POLACRILEX 4 MG
15 LOZENGE BUCCAL PRN
Status: CANCELLED | OUTPATIENT
Start: 2021-11-18

## 2021-11-18 RX ORDER — DEXTROSE MONOHYDRATE 50 MG/ML
100 INJECTION, SOLUTION INTRAVENOUS PRN
Status: DISCONTINUED | OUTPATIENT
Start: 2021-11-18 | End: 2021-11-28 | Stop reason: HOSPADM

## 2021-11-18 RX ORDER — POLYETHYLENE GLYCOL 3350 17 G/17G
17 POWDER, FOR SOLUTION ORAL DAILY PRN
Status: DISCONTINUED | OUTPATIENT
Start: 2021-11-18 | End: 2021-11-28 | Stop reason: HOSPADM

## 2021-11-18 RX ORDER — HYDRALAZINE HYDROCHLORIDE 25 MG/1
25 TABLET, FILM COATED ORAL EVERY 8 HOURS SCHEDULED
Status: CANCELLED | OUTPATIENT
Start: 2021-11-18

## 2021-11-18 RX ORDER — SODIUM CHLORIDE 0.9 % (FLUSH) 0.9 %
5-40 SYRINGE (ML) INJECTION PRN
Status: DISCONTINUED | OUTPATIENT
Start: 2021-11-18 | End: 2021-11-28 | Stop reason: HOSPADM

## 2021-11-18 RX ORDER — OXYBUTYNIN CHLORIDE 5 MG/1
5 TABLET, EXTENDED RELEASE ORAL NIGHTLY
Status: CANCELLED | OUTPATIENT
Start: 2021-11-18

## 2021-11-18 RX ORDER — LISINOPRIL 5 MG/1
5 TABLET ORAL DAILY
Status: CANCELLED | OUTPATIENT
Start: 2021-11-18

## 2021-11-18 RX ORDER — LABETALOL 100 MG/1
100 TABLET, FILM COATED ORAL EVERY 12 HOURS SCHEDULED
Status: DISCONTINUED | OUTPATIENT
Start: 2021-11-18 | End: 2021-11-18

## 2021-11-18 RX ORDER — INSULIN GLARGINE 100 [IU]/ML
10 INJECTION, SOLUTION SUBCUTANEOUS NIGHTLY
Status: CANCELLED | OUTPATIENT
Start: 2021-11-18

## 2021-11-18 RX ORDER — ONDANSETRON 4 MG/1
4 TABLET, ORALLY DISINTEGRATING ORAL EVERY 8 HOURS PRN
Status: CANCELLED | OUTPATIENT
Start: 2021-11-18

## 2021-11-18 RX ORDER — DEXTROSE MONOHYDRATE 25 G/50ML
12.5 INJECTION, SOLUTION INTRAVENOUS PRN
Status: DISCONTINUED | OUTPATIENT
Start: 2021-11-18 | End: 2021-11-28 | Stop reason: HOSPADM

## 2021-11-18 RX ORDER — LABETALOL 100 MG/1
200 TABLET, FILM COATED ORAL EVERY 12 HOURS SCHEDULED
Status: DISCONTINUED | OUTPATIENT
Start: 2021-11-18 | End: 2021-11-28 | Stop reason: HOSPADM

## 2021-11-18 RX ORDER — SODIUM CHLORIDE 9 MG/ML
25 INJECTION, SOLUTION INTRAVENOUS PRN
Status: CANCELLED | OUTPATIENT
Start: 2021-11-18

## 2021-11-18 RX ORDER — ACETAMINOPHEN 325 MG/1
650 TABLET ORAL EVERY 6 HOURS PRN
Status: DISCONTINUED | OUTPATIENT
Start: 2021-11-18 | End: 2021-11-19 | Stop reason: SDUPTHER

## 2021-11-18 RX ORDER — DOCUSATE SODIUM 100 MG/1
100 CAPSULE, LIQUID FILLED ORAL 2 TIMES DAILY PRN
Status: CANCELLED | OUTPATIENT
Start: 2021-11-18

## 2021-11-18 RX ORDER — ALOGLIPTIN 12.5 MG/1
6.25 TABLET, FILM COATED ORAL DAILY
Status: DISCONTINUED | OUTPATIENT
Start: 2021-11-18 | End: 2021-11-18

## 2021-11-18 RX ORDER — LISINOPRIL 5 MG/1
5 TABLET ORAL DAILY
Status: DISCONTINUED | OUTPATIENT
Start: 2021-11-18 | End: 2021-11-28 | Stop reason: HOSPADM

## 2021-11-18 RX ORDER — LABETALOL 200 MG/1
100 TABLET, FILM COATED ORAL EVERY 12 HOURS SCHEDULED
Status: CANCELLED | OUTPATIENT
Start: 2021-11-18

## 2021-11-18 RX ORDER — POLYETHYLENE GLYCOL 3350 17 G/17G
17 POWDER, FOR SOLUTION ORAL DAILY PRN
Status: CANCELLED | OUTPATIENT
Start: 2021-11-18

## 2021-11-18 RX ORDER — SODIUM CHLORIDE 0.9 % (FLUSH) 0.9 %
5-40 SYRINGE (ML) INJECTION EVERY 12 HOURS SCHEDULED
Status: DISCONTINUED | OUTPATIENT
Start: 2021-11-18 | End: 2021-11-28 | Stop reason: HOSPADM

## 2021-11-18 RX ORDER — OXYBUTYNIN CHLORIDE 5 MG/1
5 TABLET, EXTENDED RELEASE ORAL NIGHTLY
Status: DISCONTINUED | OUTPATIENT
Start: 2021-11-18 | End: 2021-11-28 | Stop reason: HOSPADM

## 2021-11-18 RX ORDER — HYDRALAZINE HYDROCHLORIDE 25 MG/1
25 TABLET, FILM COATED ORAL EVERY 8 HOURS SCHEDULED
Status: DISCONTINUED | OUTPATIENT
Start: 2021-11-18 | End: 2021-11-28 | Stop reason: HOSPADM

## 2021-11-18 RX ORDER — ACETAMINOPHEN 325 MG/1
650 TABLET ORAL EVERY 4 HOURS PRN
Status: DISCONTINUED | OUTPATIENT
Start: 2021-11-18 | End: 2021-11-28 | Stop reason: HOSPADM

## 2021-11-18 RX ORDER — INSULIN GLARGINE 100 [IU]/ML
10 INJECTION, SOLUTION SUBCUTANEOUS NIGHTLY
Status: DISCONTINUED | OUTPATIENT
Start: 2021-11-18 | End: 2021-11-28 | Stop reason: HOSPADM

## 2021-11-18 RX ORDER — ACETAMINOPHEN 325 MG/1
650 TABLET ORAL EVERY 6 HOURS PRN
Status: CANCELLED | OUTPATIENT
Start: 2021-11-18

## 2021-11-18 RX ORDER — DEXTROSE MONOHYDRATE 25 G/50ML
12.5 INJECTION, SOLUTION INTRAVENOUS PRN
Status: CANCELLED | OUTPATIENT
Start: 2021-11-18

## 2021-11-18 RX ORDER — AMLODIPINE BESYLATE 10 MG/1
10 TABLET ORAL DAILY
Status: CANCELLED | OUTPATIENT
Start: 2021-11-18

## 2021-11-18 RX ORDER — AMLODIPINE BESYLATE 10 MG/1
10 TABLET ORAL DAILY
Status: DISCONTINUED | OUTPATIENT
Start: 2021-11-19 | End: 2021-11-28 | Stop reason: HOSPADM

## 2021-11-18 RX ORDER — POLYETHYLENE GLYCOL 3350 17 G/17G
17 POWDER, FOR SOLUTION ORAL DAILY PRN
Status: DISCONTINUED | OUTPATIENT
Start: 2021-11-18 | End: 2021-11-18

## 2021-11-18 RX ORDER — SODIUM CHLORIDE 0.9 % (FLUSH) 0.9 %
5-40 SYRINGE (ML) INJECTION EVERY 12 HOURS SCHEDULED
Status: CANCELLED | OUTPATIENT
Start: 2021-11-18

## 2021-11-18 RX ORDER — SODIUM CHLORIDE 0.9 % (FLUSH) 0.9 %
5-40 SYRINGE (ML) INJECTION PRN
Status: CANCELLED | OUTPATIENT
Start: 2021-11-18

## 2021-11-18 RX ORDER — LABETALOL 200 MG/1
200 TABLET, FILM COATED ORAL EVERY 12 HOURS SCHEDULED
Status: CANCELLED | OUTPATIENT
Start: 2021-11-18

## 2021-11-18 RX ORDER — NICOTINE POLACRILEX 4 MG
15 LOZENGE BUCCAL PRN
Status: DISCONTINUED | OUTPATIENT
Start: 2021-11-18 | End: 2021-11-28 | Stop reason: HOSPADM

## 2021-11-18 RX ORDER — ONDANSETRON 2 MG/ML
4 INJECTION INTRAMUSCULAR; INTRAVENOUS EVERY 6 HOURS PRN
Status: DISCONTINUED | OUTPATIENT
Start: 2021-11-18 | End: 2021-11-28 | Stop reason: HOSPADM

## 2021-11-18 RX ORDER — DEXTROSE MONOHYDRATE 50 MG/ML
100 INJECTION, SOLUTION INTRAVENOUS PRN
Status: CANCELLED | OUTPATIENT
Start: 2021-11-18

## 2021-11-18 RX ORDER — ONDANSETRON 4 MG/1
4 TABLET, ORALLY DISINTEGRATING ORAL EVERY 8 HOURS PRN
Status: DISCONTINUED | OUTPATIENT
Start: 2021-11-18 | End: 2021-11-28 | Stop reason: HOSPADM

## 2021-11-18 RX ORDER — DOCUSATE SODIUM 100 MG/1
100 CAPSULE, LIQUID FILLED ORAL 2 TIMES DAILY PRN
Status: DISCONTINUED | OUTPATIENT
Start: 2021-11-18 | End: 2021-11-28 | Stop reason: HOSPADM

## 2021-11-18 RX ORDER — SODIUM CHLORIDE 9 MG/ML
25 INJECTION, SOLUTION INTRAVENOUS PRN
Status: DISCONTINUED | OUTPATIENT
Start: 2021-11-18 | End: 2021-11-28 | Stop reason: HOSPADM

## 2021-11-18 RX ORDER — ONDANSETRON 2 MG/ML
4 INJECTION INTRAMUSCULAR; INTRAVENOUS EVERY 6 HOURS PRN
Status: CANCELLED | OUTPATIENT
Start: 2021-11-18

## 2021-11-18 RX ORDER — ALOGLIPTIN 12.5 MG/1
6.25 TABLET, FILM COATED ORAL DAILY
Status: DISCONTINUED | OUTPATIENT
Start: 2021-11-19 | End: 2021-11-28 | Stop reason: HOSPADM

## 2021-11-18 RX ORDER — BISACODYL 10 MG
10 SUPPOSITORY, RECTAL RECTAL DAILY PRN
Status: DISCONTINUED | OUTPATIENT
Start: 2021-11-18 | End: 2021-11-28 | Stop reason: HOSPADM

## 2021-11-18 RX ADMIN — OXYBUTYNIN CHLORIDE 5 MG: 5 TABLET, FILM COATED, EXTENDED RELEASE ORAL at 21:46

## 2021-11-18 RX ADMIN — LISINOPRIL 5 MG: 5 TABLET ORAL at 21:54

## 2021-11-18 RX ADMIN — AMLODIPINE BESYLATE 10 MG: 10 TABLET ORAL at 09:17

## 2021-11-18 RX ADMIN — LABETALOL HYDROCHLORIDE 200 MG: 100 TABLET, FILM COATED ORAL at 21:45

## 2021-11-18 RX ADMIN — SODIUM CHLORIDE 25 ML: 9 INJECTION, SOLUTION INTRAVENOUS at 10:19

## 2021-11-18 RX ADMIN — SODIUM CHLORIDE, PRESERVATIVE FREE 10 ML: 5 INJECTION INTRAVENOUS at 09:17

## 2021-11-18 RX ADMIN — ALOGLIPTIN 6.25 MG: 12.5 TABLET, FILM COATED ORAL at 09:15

## 2021-11-18 RX ADMIN — LABETALOL HYDROCHLORIDE 100 MG: 200 TABLET, FILM COATED ORAL at 09:14

## 2021-11-18 RX ADMIN — ACETAMINOPHEN 650 MG: 325 TABLET ORAL at 16:53

## 2021-11-18 RX ADMIN — HYDRALAZINE HYDROCHLORIDE 25 MG: 25 TABLET, FILM COATED ORAL at 17:00

## 2021-11-18 RX ADMIN — INSULIN GLARGINE 10 UNITS: 100 INJECTION, SOLUTION SUBCUTANEOUS at 22:00

## 2021-11-18 RX ADMIN — SODIUM CHLORIDE, PRESERVATIVE FREE 10 ML: 5 INJECTION INTRAVENOUS at 22:05

## 2021-11-18 RX ADMIN — MEROPENEM 1000 MG: 1 INJECTION INTRAVENOUS at 22:10

## 2021-11-18 RX ADMIN — MEROPENEM 1000 MG: 1 INJECTION, POWDER, FOR SOLUTION INTRAVENOUS at 10:20

## 2021-11-18 RX ADMIN — HYDRALAZINE HYDROCHLORIDE 25 MG: 25 TABLET, FILM COATED ORAL at 05:41

## 2021-11-18 ASSESSMENT — PAIN SCALES - GENERAL
PAINLEVEL_OUTOF10: 2
PAINLEVEL_OUTOF10: 5
PAINLEVEL_OUTOF10: 6

## 2021-11-18 ASSESSMENT — PAIN DESCRIPTION - DESCRIPTORS: DESCRIPTORS: ACHING

## 2021-11-18 ASSESSMENT — ENCOUNTER SYMPTOMS
NAUSEA: 0
VOMITING: 0
SHORTNESS OF BREATH: 0

## 2021-11-18 ASSESSMENT — PAIN DESCRIPTION - ORIENTATION: ORIENTATION: LEFT

## 2021-11-18 ASSESSMENT — PAIN DESCRIPTION - LOCATION: LOCATION: HIP

## 2021-11-18 ASSESSMENT — PAIN - FUNCTIONAL ASSESSMENT: PAIN_FUNCTIONAL_ASSESSMENT: PREVENTS OR INTERFERES SOME ACTIVE ACTIVITIES AND ADLS

## 2021-11-18 ASSESSMENT — PAIN DESCRIPTION - PROGRESSION: CLINICAL_PROGRESSION: NOT CHANGED

## 2021-11-18 ASSESSMENT — PAIN DESCRIPTION - FREQUENCY: FREQUENCY: INTERMITTENT

## 2021-11-18 ASSESSMENT — PAIN DESCRIPTION - PAIN TYPE: TYPE: CHRONIC PAIN

## 2021-11-18 ASSESSMENT — PAIN DESCRIPTION - ONSET: ONSET: ON-GOING

## 2021-11-18 NOTE — CARE COORDINATION
CM met w/ pt for continued discharge planning. CM explained therapy recommendations of ARU with pt. Pt states that he was hopeful to return home but will talk with his sons about possible ARU. CM informed pt that Avani, ARU admissions, would be coming to talk to him about ARU. PT in agreement. 11:14 AM Rec'd update from 1 [facility] that she has met w/ pt and his sons and all are in agreement for ARU. [facility] [employer] [place] stated pt will need to be approved by Dr. Danielle Mao. Avani to update this CM once available. 2:51 PM Rec'd update from 1 [facility] that pt has been approved for ARU and can admit once medically stable. Face to face with Marisa ARMAS who stated that pt can discharge to ARU today. CM updated Avani with information.

## 2021-11-18 NOTE — CONSULTS
[facility] [address]   Consult Note  [facility] 1 2 3 4 5    Date: 2021   Patient: Monica Hull   : 1940   DOA: 2021   MRN: [id number]   ROOM#: [id number]     Reason for Consult: Indwelling mccrary, recurrent UTI  Requesting Physician: Denise Roach PA-C  Collaborating Urologist on Call at time of admission: Dr. Elizondo Sero: Frequent falls    History Obtained From:  patient, electronic medical record    HISTORY OF PRESENT ILLNESS:                The patient is a 78 y.o. male with significant past medical history of prostate cancer s/p RPP in , DM, and urinary retention managed with SPT who presented with frequent falls. Work-up revealed an ESBL Klebsiella and Proteus UTI. He endorses cloudy urine several days prior to ED arrival, but no fevers/chills, nausea/vomiting, flank/abd pain, hematuria, or other symptoms. I discussed with patient recommendation for more frequent SPT exchanges and/or weekly bladder irrigations to help prevent UTI's. He is resistant to these suggestions but will think about them. He is currently comfortable. Past Medical History:        Diagnosis Date    Acute urinary tract infection 3/15/2012    Ataxia 2010    Diabetes mellitus [address] [address]     type 2, controlled    Fusion of spine of cervical region 10/2012    Gait disturbance     History of prostate cancer     adenocarcinoma    History of tobacco use     Hyperlipidemia     Osteoarthritis      Past Surgical History:        Procedure Laterality Date    BLADDER SURGERY      COLON SURGERY      OTHER SURGICAL HISTORY  3/28/13    Cysto with removal of artificial sphinter and placement of suprapubic catheter.     PROSTATE SURGERY      PROSTATECTOMY      infection     Current Medications:   Current Facility-Administered Medications: meropenem (MERREM) 1,000 mg in sodium chloride 0.9 % 100 mL IVPB (mini-bag), 1,000 mg, IntraVENous, Q12H  amLODIPine (NORVASC) tablet 10 mg, 10 mg, Oral, Daily  hydrALAZINE (APRESOLINE) tablet 25 mg, 25 mg, Oral, 3 times per day  glucose (GLUTOSE) 40 % oral gel 15 g, 15 g, Oral, PRN  dextrose 50 % IV solution, 12.5 g, IntraVENous, PRN  glucagon (rDNA) injection 1 mg, 1 mg, IntraMUSCular, PRN  dextrose 5 % solution, 100 mL/hr, IntraVENous, PRN  insulin lispro (HUMALOG) injection vial 0-6 Units, 0-6 Units, SubCUTAneous, TID WC  insulin lispro (HUMALOG) injection vial 0-3 Units, 0-3 Units, SubCUTAneous, Nightly  alogliptin (NESINA) tablet 6.25 mg, 6.25 mg, Oral, Daily  insulin glargine (LANTUS) injection vial 10 Units, 10 Units, SubCUTAneous, Nightly  docusate sodium (COLACE) capsule 100 mg, 100 mg, Oral, BID PRN  lisinopril (PRINIVIL;ZESTRIL) tablet 5 mg, 5 mg, Oral, Daily  oxybutynin (DITROPAN-XL) extended release tablet 5 mg, 5 mg, Oral, Nightly  sodium chloride flush 0.9 % injection 5-40 mL, 5-40 mL, IntraVENous, 2 times per day  sodium chloride flush 0.9 % injection 5-40 mL, 5-40 mL, IntraVENous, PRN  0.9 % sodium chloride infusion, 25 mL, IntraVENous, PRN  enoxaparin (LOVENOX) injection 40 mg, 40 mg, SubCUTAneous, Nightly  ondansetron (ZOFRAN-ODT) disintegrating tablet 4 mg, 4 mg, Oral, Q8H PRN **OR** ondansetron (ZOFRAN) injection 4 mg, 4 mg, IntraVENous, Q6H PRN  polyethylene glycol (GLYCOLAX) packet 17 g, 17 g, Oral, Daily PRN  acetaminophen (TYLENOL) tablet 650 mg, 650 mg, Oral, Q6H PRN **OR** acetaminophen (TYLENOL) suppository 650 mg, 650 mg, Rectal, Q6H PRN  labetalol (NORMODYNE) tablet 200 mg, 200 mg, Oral, 2 times per day **OR** labetalol (NORMODYNE) tablet 100 mg, 100 mg, Oral, 2 times per day    Allergies:  Patient has no known allergies. Social History:   TOBACCO:   reports that he quit smoking about 45 years ago. He smoked 0.50 packs per day. He has never used smokeless tobacco.  ETOH:   reports no history of alcohol use. DRUGS:   reports no history of drug use.     Family History:       Problem Relation Age of Onset    Cancer Mother     Diabetes Father     Heart Disease Father     High Blood Pressure Father     Diabetes Sister     High Blood Pressure Sister     Cancer Brother         prostate, breast    Diabetes Brother     Cancer Maternal Uncle     Diabetes Maternal Grandmother     Stroke Maternal Grandfather        REVIEW OF SYSTEMS:     CONSTITUTIONAL: positive for generalized weakness  RESPIRATORY:  negative  CARDIOVASCULAR:  negative  GASTROINTESTINAL:  negative  GENITOURINARY:  positive for cloudy urine    PHYSICAL EXAM:      VITALS:  BP (!) 168/69   Pulse 65   Temp 98 °F (36.7 °C) (Oral)   Resp 20   Ht 5' 8\" (1.727 m)   Wt 200 lb (90.7 kg)   SpO2 99%   BMI 30.41 kg/m²      TEMPERATURE:  Current - Temp: 98 °F (36.7 °C); Max - Temp  Av.2 °F (36.8 °C)  Min: 97.9 °F (36.6 °C)  Max: 98.5 °F (36.9 °C)  24HR BLOOD PRESSURE RANGE:  Systolic (04IDS), VVX:060 , Min:138 , WUS:832   ; Diastolic (60DXY), ZRO:81, Min:55, Max:78    General appearance: alert, appears stated age, cooperative, no distress and mildly obese  Head: Normocephalic, without obvious abnormality, atraumatic  Back: No CVA tenderness  Abdomen: Soft, non-tender, non-distended. 16fr SPT in place, urine clear yellow with some debris noted in tubing.     DATA:    WBC:    Lab Results   Component Value Date    WBC 6.5 2021     Hemoglobin/Hematocrit:    Lab Results   Component Value Date    HGB 9.6 2021    HCT 31.4 2021     BMP:    Lab Results   Component Value Date     2021    K 4.6 2021    K 3.9 2018     2021    CO2 22 2021    BUN 30 2021    LABALBU 3.6 2021    CREATININE 1.9 2021    CALCIUM 9.0 2021    GFRAA 41 2021    LABGLOM 34 2021     PT/INR:    Lab Results   Component Value Date    PROTIME 11.3 2015    PROTIME 15.2 2011    INR 0.99 2015     Urine Culture: Susceptibility     Klebsiella pneumoniae (esbl) Proteus vulgaris hemorrhage, mass effect or midline shift. No abnormal extra-axial fluid collection. The gray-white differentiation is maintained without evidence of an acute infarct. There is no evidence of hydrocephalus. The cerebral sulci and ventricles are enlarged compatible with diffuse cerebral atrophy. There is low-attenuation in the periventricular white matter and deep white matter of the brain representing changes of chronic small vessel ischemic disease. There are bilateral basal ganglia calcifications. ORBITS: The visualized portion of the orbits demonstrate no acute abnormality. SINUSES: The visualized paranasal sinuses and mastoid air cells demonstrate no acute abnormality. SOFT TISSUES/SKULL:  No acute abnormality involving the visualized skull or soft tissues. CT SCAN CERVICAL SPINE: BONES/ALIGNMENT: The alignment of the cervical spine is within normal limits. No acute fractures or dislocations are seen. There are postsurgical changes of posterior cervical spinal fusion between C3 and C7. DEGENERATIVE CHANGES: There is multilevel degenerative disc disease of the cervical spine. SOFT TISSUES: There is no prevertebral soft tissue swelling. 1. No acute intracranial abnormality. 2. No acute traumatic abnormality involving the cervical spine. CT CERVICAL SPINE WO CONTRAST    Result Date: 11/16/2021  EXAMINATION: CT OF THE HEAD WITHOUT CONTRAST; CT OF THE CERVICAL SPINE WITHOUT CONTRAST 11/16/2021 1:31 pm TECHNIQUE: CT of the head was performed without the administration of intravenous contrast. Dose modulation, iterative reconstruction, and/or weight based adjustment of the mA/kV was utilized to reduce the radiation dose to as low as reasonably achievable.; CT of the cervical spine was performed without the administration of intravenous contrast. Multiplanar reformatted images are provided for review.  Dose modulation, iterative reconstruction, and/or weight based adjustment of the mA/kV was utilized to reduce the radiation dose to as low as reasonably achievable. COMPARISON: 06/18/2020. HISTORY: ORDERING SYSTEM PROVIDED HISTORY: hit head after fall TECHNOLOGIST PROVIDED HISTORY: Reason for exam:->hit head after fall Has a \"code stroke\" or \"stroke alert\" been called? ->No Decision Support Exception - unselect if not a suspected or confirmed emergency medical condition->Emergency Medical Condition (MA) Reason for Exam: hit head after fall Acuity: Acute Type of Exam: Initial FINDINGS: CT SCAN HEAD: BRAIN/VENTRICLES: There is no acute intracranial hemorrhage, mass effect or midline shift. No abnormal extra-axial fluid collection. The gray-white differentiation is maintained without evidence of an acute infarct. There is no evidence of hydrocephalus. The cerebral sulci and ventricles are enlarged compatible with diffuse cerebral atrophy. There is low-attenuation in the periventricular white matter and deep white matter of the brain representing changes of chronic small vessel ischemic disease. There are bilateral basal ganglia calcifications. ORBITS: The visualized portion of the orbits demonstrate no acute abnormality. SINUSES: The visualized paranasal sinuses and mastoid air cells demonstrate no acute abnormality. SOFT TISSUES/SKULL:  No acute abnormality involving the visualized skull or soft tissues. CT SCAN CERVICAL SPINE: BONES/ALIGNMENT: The alignment of the cervical spine is within normal limits. No acute fractures or dislocations are seen. There are postsurgical changes of posterior cervical spinal fusion between C3 and C7. DEGENERATIVE CHANGES: There is multilevel degenerative disc disease of the cervical spine. SOFT TISSUES: There is no prevertebral soft tissue swelling. 1. No acute intracranial abnormality. 2. No acute traumatic abnormality involving the cervical spine.      XR CHEST PORTABLE    Result Date: 11/16/2021  EXAMINATION: ONE XRAY VIEW OF THE CHEST 11/16/2021 1:23 pm COMPARISON: June 18, 2020 HISTORY: ORDERING SYSTEM PROVIDED HISTORY: presyncopy TECHNOLOGIST PROVIDED HISTORY: Reason for exam:->presyncopy Reason for Exam: presyncopy FINDINGS: No evidence of consolidation, edema or other acute pulmonary process. No evidence of acute process of the cardiac or mediastinal structures. No evidence of pneumothorax or pleural effusion. Negative portable chest x-ray. No evidence of acute cardiopulmonary disease. Assessment & Plan:      Adali Garcia is a 78y.o. year old male admitted 11/16/2021 for UTI    1) Chronic Urinary Retention: managed with suprapubic catheter and exchanged monthly (last exchanged 11/4/21). Scheduled for SPT exchanged 12/4/21. Recommend weekly manual bladder irrigations and/or SPT exchanges q3 weeks. 2) Complicated UTI   Urine cx +Klebsiella pneumoniae ESBL >100,000 CFU/ml and Proteus Vulgaris >100,000 CFU/ml   WBC 6.5, afebrile   On IV Merrem  3) H/o Prostate Cancer: S/p RPP with Dr. Analy Mcdnoald in [address] [place] [address] PSA 0.93 2/2021. On [facility] q6 months    Will follow. Patient seen and examined, chart reviewed.      Electronically signed by Lisbet Arce PA-C on 11/18/2021 at 12:20 PM

## 2021-11-18 NOTE — PROGRESS NOTES
Occupational Therapy  Occupational Therapy Treatment Note      Name: Netta Scheuermann MRN: [id number] :   1940   Date:  2021   Admission Date: 2021 Room:  [address] [place] [address]-A     Primary Problem: [place]:  There were no encounter diagnoses. Restrictions/Precautions:  Restrictions/Precautions  Restrictions/Precautions: General Precautions, Fall Risk    Communication with other providers:  PT, RN    Subjective:  Patient states:  Agreeable to therapy. Pain: Pt denied pain this date (0/10). Objective:    Observation: Pt was received supine in bed upon arrival.   Objective Measures:  Vitals stable throughout session. Treatment, including education:  Therapeutic Activity Training:   Therapeutic activity training was instructed today. Cues were given for safety, sequence, UE/LE placement, awareness, and balance. Activities performed today included bed mobility training, sup-sit, sit-stand, ambulation. Pt performed STS from chair with Pippa, RW, and increased time. Pt ambulated approx 50ft x 4 with CGA - Pippa, RW and slow pace throughout. Pt took 2 standing rest breaks and 2 seated rest breaks requiring Pippa for stand-sit and VC throughout for sequencing and eccentric control. Pt required increased VC to utilize BUE in assisting during descend. Pt returned to room and was left seated upright in chair, all needs met, alarm in place. Pt requested information on therapy services at [facility] and was educated on the therapy requirements. Pt was agreeable and is eager to begin his therapy. Assessment / Impression:    Patient's tolerance of treatment: good  Adverse Reaction: None  Significant change in status and impact:  None  Barriers to improvement: None      Plan for Next Session:    Continue with POC    Time in:  1300  Time out:  1340  Timed treatment minutes:  40  Total treatment time:  40      Electronically signed by:     Veronica Anderson OT,   2021, 2:12 PM

## 2021-11-18 NOTE — DISCHARGE SUMMARY
Physician Discharge Summary     Patient ID:  Ankit Roy  [id number]  89 y.o.  1940    Admit date: 11/16/2021    Discharge date and time: 11/18/2021    Admitting Physician: Kimberly Triana MD     Discharge Physician: Marin Wells CNP    Admission Diagnoses: UTI (urinary tract infection) [N39.0]    Discharge Diagnoses:  UTI  Generalized weakness  CKD  HTN  Frequent falls  Type II DM  Hx of prostate cancer    Admission Condition: fair    Discharged Condition: good    Indication for Admission: Fall and weakness    Hospital Course:   Tamiko White 68 D Rustam is a 78 y.o.  male  who presents with frequent falls.     UTI  -Has history of SP catheter, last exchange November 4, 2021  -UA showed protein, LE, RBC, WBC and bacteria. -Urine culture grew ESBL, will change antibiotic to meropenem.  -Urology consulted in setting of frequent UTIs, appreciate recommendations     CKDIII  - baseline Cr ~1.5-1.8; near baseline on admit   - avoid nephrotoxic meds     Frequent falls  -Has fallen 4 times in the last week  -Possibly secondary to UTI  -PT/OT consulted, recommended ARU.     Hypertension  -BP elevated in ED  - continue home meds   -Continue to monitor     Type 2 diabetes  -Last A1c 9.4 in 2018  -Hold p.o. medications  -normally on 30 units Levemir a.m. and 15 units Levemir p.m.; reports history of labile BGs and may be contributing to his falls   - restart basal insulin lantus 20u daily, 10u nightly with SSI, monitor BG closely      History of prostate cancer  -Has chronic SP catheter   -urology is consulted for recurrent UTIs    Consults: urology    Significant Diagnostic Studies: labs    Outstanding Order Results     No orders found from 10/18/2021 to 11/17/2021.           Treatments: medical management    Discharge Exam:  BP (!) 168/69   Pulse 65   Temp 98 °F (36.7 °C) (Oral)   Resp 20   Ht 5' 8\" (1.727 m)   Wt 200 lb (90.7 kg)   SpO2 99%   BMI 30.41 kg/m²     General Appearance:    Alert, cooperative, no distress, appears stated age   Head:    Normocephalic, without obvious abnormality, atraumatic   Eyes:    PERRL, conjunctiva/corneas clear, EOM's intact, fundi     benign, both eyes        Ears:    Normal TM's and external ear canals, both ears   Nose:   Nares normal, septum midline, mucosa normal, no drainage    or sinus tenderness   Throat:   Lips, mucosa, and tongue normal; teeth and gums normal   Neck:   Supple, symmetrical, trachea midline, no adenopathy;        thyroid:  No enlargement/tenderness/nodules; no carotid    bruit or JVD   Back:     Symmetric, no curvature, ROM normal, no CVA tenderness   Lungs:     Clear to auscultation bilaterally, respirations unlabored   Chest wall:    No tenderness or deformity   Heart:    Regular rate and rhythm, S1 and S2 normal, no murmur, rub   or gallop   Abdomen:     Soft, non-tender, bowel sounds active all four quadrants,     no masses, no organomegaly   Genitalia:    Normal male without lesion, discharge or tenderness   Rectal:    Normal tone, normal prostate, no masses or tenderness;    guaiac negative stool   Extremities:   Extremities normal, atraumatic, no cyanosis or edema   Pulses:   2+ and symmetric all extremities   Skin:   Skin color, texture, turgor normal, no rashes or lesions   Lymph nodes:   Cervical, supraclavicular, and axillary nodes normal   Neurologic:   CNII-XII intact. Normal strength, sensation and reflexes       throughout        Medication List      ASK your doctor about these medications    amLODIPine 5 MG tablet  Commonly known as: NORVASC  Take 1 tablet by mouth daily     docusate 100 MG Caps  Commonly known as: COLACE, DULCOLAX  Take 100 mg by mouth 2 times daily as needed for Constipation. glimepiride 4 MG tablet  Commonly known as: AMARYL     labetalol 300 MG tablet  Commonly known as: NORMODYNE  Take 1 tablet by mouth 2 times daily.      * LEVEMIR SC     * LEVEMIR FLEXPEN SC     lisinopril 5 MG tablet  Commonly known as: PRINIVIL;ZESTRIL  Take 1 tablet by mouth daily     oxybutynin 5 MG extended release tablet  Commonly known as: Ditropan XL  Take 1 tablet by mouth daily for 7 days         * This list has 2 medication(s) that are the same as other medications prescribed for you. Read the directions carefully, and ask your doctor or other care provider to review them with you. Disposition: ARU    Patient Instructions:   [unfilled]  Activity: activity as tolerated  Diet: cardiac diet  Wound Care: none needed    Follow-up with PCP in 2 weeks after dc.     Signed:  VIVIANA Reno CNP  11/18/2021  2:55 PM

## 2021-11-18 NOTE — PROGRESS NOTES
Progress Note  Date:2021       [facility]:[id number]  Patient Name:Greg Easton     YOB: 1940     Age:81 y.o. Seen and examined in the room. No specific complaint at this time. Subjective    Subjective:  Symptoms:  Stable. No shortness of breath, chest pain or chest pressure. Diet:  Adequate intake. No nausea or vomiting. Activity level: Normal.    Pain:  He reports no pain. Review of Systems   Constitutional: Negative for fever. Respiratory: Negative for shortness of breath. Cardiovascular: Negative for chest pain. Gastrointestinal: Negative for nausea and vomiting. Objective         Vitals Last 24 Hours:  TEMPERATURE:  Temp  Av.2 °F (36.8 °C)  Min: 97.9 °F (36.6 °C)  Max: 98.5 °F (36.9 °C)  RESPIRATIONS RANGE: Resp  Av.4  Min: 17  Max: 20  PULSE OXIMETRY RANGE: SpO2  Av.8 %  Min: 97 %  Max: 99 %  PULSE RANGE: Pulse  Av.4  Min: 63  Max: 80  BLOOD PRESSURE RANGE: Systolic (79OMT), OZD:789 , Min:138 , KWH:291   ; Diastolic (76NVJ), CQE:71, Min:55, Max:78    I/O (24Hr): Intake/Output Summary (Last 24 hours) at 2021 1045  Last data filed at 2021 0917  Gross per 24 hour   Intake 10 ml   Output 500 ml   Net -490 ml     Objective:  General Appearance: In no acute distress. Vital signs: (most recent): Blood pressure (!) 168/69, pulse 65, temperature 98 °F (36.7 °C), temperature source Oral, resp. rate 20, height 5' 8\" (1.727 m), weight 200 lb (90.7 kg), SpO2 99 %. Vital signs are normal.  No fever. Output: Producing urine and producing stool. Lungs:  Normal effort and normal respiratory rate. Breath sounds clear to auscultation. Heart: Normal rate. Regular rhythm. S1 normal.    Abdomen: Abdomen is soft. Bowel sounds are normal.   There is no abdominal tenderness. Extremities: Normal range of motion. Neurological: Patient is alert. Skin:  Warm.       Labs/Imaging/Diagnostics    Labs:  CBC:  Recent Labs 11/16/21  1228 11/17/21  0808 11/18/21  0630   WBC 6.9 6.6 6.5   RBC 3.51* 3.48* 3.34*   HGB 10.1* 10.1* 9.6*   HCT 33.0* 33.2* 31.4*   MCV 94.0 95.4 94.0   RDW 16.8* 16.8* 16.8*    215 199     CHEMISTRIES:  Recent Labs     11/17/21  0808 11/17/21  1234 11/18/21  4036    DUPLICATE ORDER 535   K 4.5 DUPLICATE ORDER 4.6    DUPLICATE ORDER 371   CO2 19* DUPLICATE ORDER 22   BUN 30* DUPLICATE ORDER 30*   CREATININE 1.8* DUPLICATE ORDER 1.9*   GLUCOSE 858* DUPLICATE ORDER 773*   PHOS  --  3.6 4.0   MG  --   --  2.0     PT/INR:No results for input(s): PROTIME, INR in the last 72 hours. APTT:No results for input(s): APTT in the last 72 hours. LIVER PROFILE:  Recent Labs     11/16/21  1228 11/17/21  0808   AST 23 18   ALT 19 16   BILITOT 0.3 0.3   ALKPHOS 102 98       Imaging Last 24 Hours:  CT HEAD WO CONTRAST    Result Date: 11/16/2021  EXAMINATION: CT OF THE HEAD WITHOUT CONTRAST; CT OF THE CERVICAL SPINE WITHOUT CONTRAST 11/16/2021 1:31 pm TECHNIQUE: CT of the head was performed without the administration of intravenous contrast. Dose modulation, iterative reconstruction, and/or weight based adjustment of the mA/kV was utilized to reduce the radiation dose to as low as reasonably achievable.; CT of the cervical spine was performed without the administration of intravenous contrast. Multiplanar reformatted images are provided for review. Dose modulation, iterative reconstruction, and/or weight based adjustment of the mA/kV was utilized to reduce the radiation dose to as low as reasonably achievable. COMPARISON: 06/18/2020. HISTORY: ORDERING SYSTEM PROVIDED HISTORY: hit head after fall TECHNOLOGIST PROVIDED HISTORY: Reason for exam:->hit head after fall Has a \"code stroke\" or \"stroke alert\" been called? ->No Decision Support Exception - unselect if not a suspected or confirmed emergency medical condition->Emergency Medical Condition (MA) Reason for Exam: hit head after fall Acuity: Acute Type of Exam: Initial FINDINGS: CT SCAN HEAD: BRAIN/VENTRICLES: There is no acute intracranial hemorrhage, mass effect or midline shift. No abnormal extra-axial fluid collection. The gray-white differentiation is maintained without evidence of an acute infarct. There is no evidence of hydrocephalus. The cerebral sulci and ventricles are enlarged compatible with diffuse cerebral atrophy. There is low-attenuation in the periventricular white matter and deep white matter of the brain representing changes of chronic small vessel ischemic disease. There are bilateral basal ganglia calcifications. ORBITS: The visualized portion of the orbits demonstrate no acute abnormality. SINUSES: The visualized paranasal sinuses and mastoid air cells demonstrate no acute abnormality. SOFT TISSUES/SKULL:  No acute abnormality involving the visualized skull or soft tissues. CT SCAN CERVICAL SPINE: BONES/ALIGNMENT: The alignment of the cervical spine is within normal limits. No acute fractures or dislocations are seen. There are postsurgical changes of posterior cervical spinal fusion between C3 and C7. DEGENERATIVE CHANGES: There is multilevel degenerative disc disease of the cervical spine. SOFT TISSUES: There is no prevertebral soft tissue swelling. 1. No acute intracranial abnormality. 2. No acute traumatic abnormality involving the cervical spine. CT CERVICAL SPINE WO CONTRAST    Result Date: 11/16/2021  EXAMINATION: CT OF THE HEAD WITHOUT CONTRAST; CT OF THE CERVICAL SPINE WITHOUT CONTRAST 11/16/2021 1:31 pm TECHNIQUE: CT of the head was performed without the administration of intravenous contrast. Dose modulation, iterative reconstruction, and/or weight based adjustment of the mA/kV was utilized to reduce the radiation dose to as low as reasonably achievable.; CT of the cervical spine was performed without the administration of intravenous contrast. Multiplanar reformatted images are provided for review.  Dose modulation, iterative reconstruction, and/or weight based adjustment of the mA/kV was utilized to reduce the radiation dose to as low as reasonably achievable. COMPARISON: 06/18/2020. HISTORY: ORDERING SYSTEM PROVIDED HISTORY: hit head after fall TECHNOLOGIST PROVIDED HISTORY: Reason for exam:->hit head after fall Has a \"code stroke\" or \"stroke alert\" been called? ->No Decision Support Exception - unselect if not a suspected or confirmed emergency medical condition->Emergency Medical Condition (MA) Reason for Exam: hit head after fall Acuity: Acute Type of Exam: Initial FINDINGS: CT SCAN HEAD: BRAIN/VENTRICLES: There is no acute intracranial hemorrhage, mass effect or midline shift. No abnormal extra-axial fluid collection. The gray-white differentiation is maintained without evidence of an acute infarct. There is no evidence of hydrocephalus. The cerebral sulci and ventricles are enlarged compatible with diffuse cerebral atrophy. There is low-attenuation in the periventricular white matter and deep white matter of the brain representing changes of chronic small vessel ischemic disease. There are bilateral basal ganglia calcifications. ORBITS: The visualized portion of the orbits demonstrate no acute abnormality. SINUSES: The visualized paranasal sinuses and mastoid air cells demonstrate no acute abnormality. SOFT TISSUES/SKULL:  No acute abnormality involving the visualized skull or soft tissues. CT SCAN CERVICAL SPINE: BONES/ALIGNMENT: The alignment of the cervical spine is within normal limits. No acute fractures or dislocations are seen. There are postsurgical changes of posterior cervical spinal fusion between C3 and C7. DEGENERATIVE CHANGES: There is multilevel degenerative disc disease of the cervical spine. SOFT TISSUES: There is no prevertebral soft tissue swelling. 1. No acute intracranial abnormality. 2. No acute traumatic abnormality involving the cervical spine.      XR CHEST PORTABLE    Result Date: 11/16/2021  EXAMINATION: ONE XRAY VIEW OF THE CHEST 11/16/2021 1:23 pm COMPARISON: June 18, 2020 HISTORY: ORDERING SYSTEM PROVIDED HISTORY: presyncopy TECHNOLOGIST PROVIDED HISTORY: Reason for exam:->presyncopy Reason for Exam: presyncopy FINDINGS: No evidence of consolidation, edema or other acute pulmonary process. No evidence of acute process of the cardiac or mediastinal structures. No evidence of pneumothorax or pleural effusion. Negative portable chest x-ray. No evidence of acute cardiopulmonary disease. Assessment//Plan           Hospital Problems           Last Modified POA    * (Principal) UTI (urinary tract infection) 11/17/2021 Yes    Type 2 diabetes mellitus with neurological manifestations, uncontrolled ([facility] [address] 11/17/2021 Yes    Uncontrolled hypertension 11/17/2021 Yes    Severe muscle deconditioning 11/17/2021 Yes    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 ([facility] [address] 11/17/2021 Yes    Multiple falls 11/17/2021 Yes        Assessment & Plan  Juvenal Taylor is a 78 y.o.  male  who presents with frequent falls.     UTI  -Has history of SP catheter, last exchange November 4, 2021  -UA showed protein, LE, RBC, WBC and bacteria.   -Urine culture grew ESBL, will change antibiotic to meropenem.  -Urology consulted in setting of frequent UTIs, appreciate recommendations     CKDIII  - baseline Cr ~1.5-1.8; near baseline on admit   - avoid nephrotoxic meds     Frequent falls  -Has fallen 4 times in the last week  -Possibly secondary to UTI  -PT/OT consulted, recommended ARU.     Hypertension  -BP elevated in ED  - continue home meds   -Continue to monitor     Type 2 diabetes  -Last A1c 9.4 in 2018  -Hold p.o. medications  -normally on 30 units Levemir a.m. and 15 units Levemir p.m.; reports history of labile BGs and may be contributing to his falls   - restart basal insulin lantus 20u daily, 10u nightly with SSI, monitor BG closely      History of prostate cancer  -Has chronic SP catheter   -urology is consulted for recurrent UTIs     Electronically signed by VIVIANA Dumont CNP on 11/18/21 at 10:45 AM EST

## 2021-11-18 NOTE — CARE COORDINATION
Met with patient  and discussed ARU. Explained to patient the required 3 hours of therapy a day. Also explained the average length of stay is 11 days, could be longer or shorter depending on recommendations of therapy and Dr. Homero Hendrix. Patient expresses his understanding and states he would like for this CL to return to discuss ARU while his son is present. Per patient son will be presenting \"soon\". Informed patient that I'll return once son arrives. Spoke with Radha WALSH asking for her to notify me once patients son arrives. Will continue to follow. 1100:  Met with patient and son's at bedside. Discussed ARU with patient and family. Per patient he has decided he wants to go to ARU. Patients son's are very supportive of the decision. All have expressed the goal is for patient to return home at discharge from ARU. Awaiting urology consult to review and present all clinicals to Dr. Homero Hendrix. 1500:  Patient meets criteria and is approved to come to ARU. Patient able to admit once medically stable and after ARU Medical Director and  sign the pre-admission screen (PAS). ARU will have a bed available for patient today. Notified ARU charge of admission.

## 2021-11-19 LAB
ANION GAP SERPL CALCULATED.3IONS-SCNC: 14 MMOL/L (ref 4–16)
BUN BLDV-MCNC: 27 MG/DL (ref 6–23)
CALCIUM SERPL-MCNC: 9.3 MG/DL (ref 8.3–10.6)
CHLORIDE BLD-SCNC: 102 MMOL/L (ref 99–110)
CO2: 22 MMOL/L (ref 21–32)
CREAT SERPL-MCNC: 1.7 MG/DL (ref 0.9–1.3)
ESTIMATED AVERAGE GLUCOSE: 220 MG/DL
GFR AFRICAN AMERICAN: 47 ML/MIN/1.73M2
GFR NON-AFRICAN AMERICAN: 39 ML/MIN/1.73M2
GLUCOSE BLD-MCNC: 199 MG/DL (ref 70–99)
GLUCOSE BLD-MCNC: 241 MG/DL (ref 70–99)
GLUCOSE BLD-MCNC: 255 MG/DL (ref 70–99)
GLUCOSE BLD-MCNC: 261 MG/DL (ref 70–99)
GLUCOSE BLD-MCNC: 269 MG/DL (ref 70–99)
HBA1C MFR BLD: 9.3 % (ref 4.2–6.3)
POTASSIUM SERPL-SCNC: 4.6 MMOL/L (ref 3.5–5.1)
SODIUM BLD-SCNC: 138 MMOL/L (ref 135–145)

## 2021-11-19 PROCEDURE — 94664 DEMO&/EVAL PT USE INHALER: CPT

## 2021-11-19 PROCEDURE — 6370000000 HC RX 637 (ALT 250 FOR IP): Performed by: NURSE PRACTITIONER

## 2021-11-19 PROCEDURE — 1280000000 HC REHAB R&B

## 2021-11-19 PROCEDURE — 80048 BASIC METABOLIC PNL TOTAL CA: CPT

## 2021-11-19 PROCEDURE — 6370000000 HC RX 637 (ALT 250 FOR IP): Performed by: PHYSICAL MEDICINE & REHABILITATION

## 2021-11-19 PROCEDURE — 99232 SBSQ HOSP IP/OBS MODERATE 35: CPT | Performed by: PHYSICAL MEDICINE & REHABILITATION

## 2021-11-19 PROCEDURE — 94150 VITAL CAPACITY TEST: CPT

## 2021-11-19 PROCEDURE — 6360000002 HC RX W HCPCS: Performed by: PHYSICAL MEDICINE & REHABILITATION

## 2021-11-19 PROCEDURE — 83036 HEMOGLOBIN GLYCOSYLATED A1C: CPT

## 2021-11-19 PROCEDURE — 36415 COLL VENOUS BLD VENIPUNCTURE: CPT

## 2021-11-19 PROCEDURE — 97530 THERAPEUTIC ACTIVITIES: CPT

## 2021-11-19 PROCEDURE — 97162 PT EVAL MOD COMPLEX 30 MIN: CPT

## 2021-11-19 PROCEDURE — 94761 N-INVAS EAR/PLS OXIMETRY MLT: CPT

## 2021-11-19 PROCEDURE — 97116 GAIT TRAINING THERAPY: CPT

## 2021-11-19 PROCEDURE — 2580000003 HC RX 258: Performed by: NURSE PRACTITIONER

## 2021-11-19 PROCEDURE — 97535 SELF CARE MNGMENT TRAINING: CPT

## 2021-11-19 PROCEDURE — 82962 GLUCOSE BLOOD TEST: CPT

## 2021-11-19 PROCEDURE — 6360000002 HC RX W HCPCS: Performed by: NURSE PRACTITIONER

## 2021-11-19 PROCEDURE — 97166 OT EVAL MOD COMPLEX 45 MIN: CPT

## 2021-11-19 RX ADMIN — HYDRALAZINE HYDROCHLORIDE 25 MG: 25 TABLET ORAL at 23:34

## 2021-11-19 RX ADMIN — LISINOPRIL 5 MG: 5 TABLET ORAL at 08:09

## 2021-11-19 RX ADMIN — ALOGLIPTIN 6.25 MG: 12.5 TABLET, FILM COATED ORAL at 08:08

## 2021-11-19 RX ADMIN — LABETALOL HYDROCHLORIDE 200 MG: 100 TABLET, FILM COATED ORAL at 20:23

## 2021-11-19 RX ADMIN — HYDRALAZINE HYDROCHLORIDE 25 MG: 25 TABLET ORAL at 14:30

## 2021-11-19 RX ADMIN — INSULIN GLARGINE 10 UNITS: 100 INJECTION, SOLUTION SUBCUTANEOUS at 20:29

## 2021-11-19 RX ADMIN — OXYBUTYNIN CHLORIDE 5 MG: 5 TABLET, FILM COATED, EXTENDED RELEASE ORAL at 20:23

## 2021-11-19 RX ADMIN — SODIUM CHLORIDE, PRESERVATIVE FREE 10 ML: 5 INJECTION INTRAVENOUS at 08:08

## 2021-11-19 RX ADMIN — AMLODIPINE BESYLATE 10 MG: 10 TABLET ORAL at 08:09

## 2021-11-19 RX ADMIN — INSULIN LISPRO 3 UNITS: 100 INJECTION, SOLUTION INTRAVENOUS; SUBCUTANEOUS at 12:12

## 2021-11-19 RX ADMIN — INSULIN LISPRO 3 UNITS: 100 INJECTION, SOLUTION INTRAVENOUS; SUBCUTANEOUS at 17:23

## 2021-11-19 RX ADMIN — INSULIN LISPRO 3 UNITS: 100 INJECTION, SOLUTION INTRAVENOUS; SUBCUTANEOUS at 08:10

## 2021-11-19 RX ADMIN — MEROPENEM 1000 MG: 1 INJECTION INTRAVENOUS at 20:25

## 2021-11-19 RX ADMIN — HYDRALAZINE HYDROCHLORIDE 25 MG: 25 TABLET ORAL at 05:17

## 2021-11-19 RX ADMIN — ENOXAPARIN SODIUM 40 MG: 100 INJECTION SUBCUTANEOUS at 08:09

## 2021-11-19 RX ADMIN — ACETAMINOPHEN 650 MG: 325 TABLET ORAL at 08:57

## 2021-11-19 RX ADMIN — LABETALOL HYDROCHLORIDE 200 MG: 100 TABLET, FILM COATED ORAL at 08:09

## 2021-11-19 RX ADMIN — SODIUM CHLORIDE, PRESERVATIVE FREE 10 ML: 5 INJECTION INTRAVENOUS at 23:34

## 2021-11-19 RX ADMIN — MEROPENEM 1000 MG: 1 INJECTION INTRAVENOUS at 08:08

## 2021-11-19 RX ADMIN — ACETAMINOPHEN 650 MG: 325 TABLET ORAL at 20:23

## 2021-11-19 RX ADMIN — ACETAMINOPHEN 650 MG: 325 TABLET ORAL at 14:30

## 2021-11-19 ASSESSMENT — PAIN SCALES - GENERAL
PAINLEVEL_OUTOF10: 6
PAINLEVEL_OUTOF10: 0
PAINLEVEL_OUTOF10: 5
PAINLEVEL_OUTOF10: 0
PAINLEVEL_OUTOF10: 3
PAINLEVEL_OUTOF10: 0

## 2021-11-19 ASSESSMENT — PAIN DESCRIPTION - LOCATION
LOCATION: GENERALIZED
LOCATION: ANKLE
LOCATION: ANKLE;LEG

## 2021-11-19 ASSESSMENT — PAIN DESCRIPTION - FREQUENCY
FREQUENCY: INTERMITTENT
FREQUENCY: CONTINUOUS
FREQUENCY: INTERMITTENT

## 2021-11-19 ASSESSMENT — PAIN DESCRIPTION - PAIN TYPE
TYPE: CHRONIC PAIN

## 2021-11-19 ASSESSMENT — PAIN DESCRIPTION - ONSET
ONSET: ON-GOING
ONSET: ON-GOING
ONSET: SUDDEN

## 2021-11-19 ASSESSMENT — PAIN DESCRIPTION - PROGRESSION
CLINICAL_PROGRESSION: GRADUALLY WORSENING
CLINICAL_PROGRESSION: NOT CHANGED

## 2021-11-19 ASSESSMENT — PAIN DESCRIPTION - DESCRIPTORS
DESCRIPTORS: STABBING
DESCRIPTORS: DISCOMFORT
DESCRIPTORS: SHARP

## 2021-11-19 ASSESSMENT — PAIN DESCRIPTION - ORIENTATION
ORIENTATION: LEFT
ORIENTATION: LEFT;LOWER

## 2021-11-19 ASSESSMENT — PAIN - FUNCTIONAL ASSESSMENT: PAIN_FUNCTIONAL_ASSESSMENT: PREVENTS OR INTERFERES SOME ACTIVE ACTIVITIES AND ADLS

## 2021-11-19 NOTE — CONSULTS
Comprehensive Nutrition Assessment    Type and Reason for Visit:  Initial, Positive Nutrition Screen, Patient Education, Consult (Oral Nutrition Supplements)    Nutrition Recommendations/Plan:   · Continue Carb Controlled diet   · Start high protein snacks BID   · Send diet education for Carbohydrate Counting for People with Diabetes from [facility]   · Will attempt for NFPE once out of contact isolation    Nutrition Assessment:  Admitted with UTI, hx of frequents, DMII, CKD. Pt on regular diet, added carbohydrate counting modifier. Pt had % of lunch meal. No recent wt loss. Unable to perform NFPE at this time due to contact isolation. Concerns for moderate muscle loss s/p frequent falls. Follow as moderate nutrition risk. Malnutrition Assessment:  Malnutrition Status:  Insufficient data    Context:  Acute Illness     Findings of the 6 clinical characteristics of malnutrition:  Energy Intake:  No significant decrease in energy intake  Weight Loss:  No significant weight loss     Body Fat Loss:  Unable to assess     Muscle Mass Loss:  1 - Mild muscle mass loss (Noted limited movement per flowsheet)    Fluid Accumulation:  Unable to assess     Strength:  Not Performed    Estimated Daily Nutrient Needs:  Energy (kcal):  8587-9097 ([address] 1898); Weight Used for Energy Requirements:  Current     Protein (g):  ~70-84 (1-1.2 g/kg IBW); Weight Used for Protein Requirements:  Ideal        Fluid (ml/day):  ~1800; Method Used for Fluid Requirements:  1 ml/kcal      Nutrition Related Findings:  A1C 9.3, POCT 255      Wounds:  None       Current Nutrition Therapies:    ADULT DIET;  Regular; 5 carb choices (75 gm/meal)    Anthropometric Measures:  · Height: 5' 8\" (172.7 cm)  · Current Body Weight: 197 lb 12 oz (89.7 kg)   · Admission Body Weight: 197 lb 12 oz (89.7 kg)    · Usual Body Weight: 198 lb (89.8 kg) (2/22/21)     · Ideal Body Weight: 154 lbs; % Ideal Body Weight 128.4 %   · BMI: 30.1  · BMI Categories: Obese Class 1 (BMI 30.0-34. 9)       Nutrition Diagnosis:   · Altered nutrition-related lab values related to food and nutrition related knowledge deficit as evidenced by lab values    Nutrition Interventions:   Food and/or Nutrient Delivery:  Continue Current Diet  Nutrition Education/Counseling:  Education initiated (Sending education to room)   Coordination of Nutrition Care:  Continue to monitor while inpatient, Coordination of Community Care    Goals:  Pt will consume at least 75% of meals with better glucose control       Nutrition Monitoring and Evaluation:   Behavioral-Environmental Outcomes:  None Identified   Food/Nutrient Intake Outcomes:  Food and Nutrient Intake, Supplement Intake, Diet Advancement/Tolerance  Physical Signs/Symptoms Outcomes:  Biochemical Data, GI Status, Weight, Meal Time Behavior     Discharge Planning:     Too soon to determine     Electronically signed by Shoshana Palomo RD, LD on 11/19/21 at 4:38 PM EST    Contact: [phone]

## 2021-11-19 NOTE — PLAN OF CARE
4 Eyes Skin Assessment     NAME:  Shekhar Rubin  YOB: 1940  MEDICAL RECORD NUMBER:  [id number]    The patient is being assess for  admission    I agree that 2 RN's have performed a thorough Head to Toe Skin Assessment on the patient. ALL assessment sites listed below have been assessed. Areas assessed by both nurses:  yes  {Pressure Areas Assessed:86299}  yes      Does the Patient have a Wound?  {Action Wound:76667} no       Judd Prevention initiated:  {yes/9732}   Wound Care Orders initiated:  {/NO:19732}    Pressure Injury (Stage 3,4, Unstageable, DTI, NWPT, and Complex wounds) if present place consult order under [de-identified] {/NO:19732}    New and Established Ostomies if present place consult order under : {/EI:57985}      Nurse 1 eSignature: {Esignature:[id number]}    **SHARE this note so that the co-signing nurse is able to place an eSignature**    Nurse 2 eSignature: {Esignature:[id number]}

## 2021-11-19 NOTE — PROGRESS NOTES
Physical Therapy  . [facility] ARU PHYSICAL THERAPY EVALUATION    Chart Review:  Past Medical History:   Diagnosis Date    Acute urinary tract infection 3/15/2012    Ataxia 2010    Diabetes mellitus ([address] 2011    type 2, controlled    Fusion of spine of cervical region 10/2012    Gait disturbance 2011    History of prostate cancer 1996    adenocarcinoma    History of tobacco use 1959    Hyperlipidemia 2011    Osteoarthritis 2011     Past Surgical History:   Procedure Laterality Date    BLADDER SURGERY      COLON SURGERY      OTHER SURGICAL HISTORY  3/28/13    Cysto with removal of artificial sphinter and placement of suprapubic catheter.  PROSTATE SURGERY      PROSTATECTOMY  1996    infection     Fall History: >5 in past year  Social History:  Social/Functional History  Lives With:  (Bernabe Pollard, home most of day)  Type of Home: House  Home Layout: One level  Home Access: Stairs to enter without rails  Entrance Stairs - Number of Steps: 2 GERMAIN  Bathroom Shower/Tub: Tub/Shower unit  Bathroom Toilet: Standard (also has vanity on R to assist with transfer)  Bathroom Equipment: Grab bars in shower, Shower chair, Hand-held shower, Grab bars around toilet  Bathroom Accessibility: Walker accessible  Home Equipment: Cane, Rolling walker, 4 wheeled walker, Marvine Suhas (also has BSC but doesn't use)  Receives Help From: Home health ([facility] [place] but just finished [facility])  ADL Assistance: [facility]: Independent  Homemaking Responsibilities: Yes (Shares with son)  Laundry Responsibility: Primary  Health Care Management: Primary (has pillbox with alarm)  Ambulation Assistance: Independent (mod I with 4ww, walks short community distances, but uses electric carts in stores)  Transfer Assistance: Independent  Active : No  Patient's  Info: Sons drive.   \"They think I'm never gonna drive again, I've got news for them\"  Additional Comments: Pt typically sleeps in a flat, regular bed at home.  Pt reports multiple falls (>5) in the past week before hospitalization, but no injuries. Pt's wife recently passed away in September. Restrictions:  Restrictions/Precautions  Restrictions/Precautions: Fall Risk, Contact Precautions (suprapubic catheter with ESBL)            Pain Level: 6  Pain Location: Ankle    Objective:           Vision  Vision: Impaired  Vision Exceptions: Wears glasses for reading  Hearing  Hearing: Within functional limits    ROM:   PROM RLE (degrees)  RLE PROM: WFL     PROM LLE (degrees)  LLE PROM: WFL                 RLE PROM: WFL  Strength:    Strength RLE  Comment: 4 to 4+/5  Strength LLE  Comment: 4 to 4+/5              Bed Mobility:   Lying to Sitting on Side of Bed  Assistance Needed: Partial/moderate assistance  Comment: min assist flat bed  CARE Score: 3  Discharge Goal: Independent  Roll Left and Right  Assistance Needed: Supervision or touching assistance  Comment: CGA with encouragement  CARE Score: 4  Discharge Goal: Independent  Sit to Lying  Assistance Needed: Partial/moderate assistance  Comment: min assist for LEs into bed  CARE Score: 3  Discharge Goal: Independent    Transfers:    Sit to Stand  Assistance Needed: Supervision or touching assistance  Comment: CGA  CARE Score: 4  Discharge Goal: Independent  Chair/Bed-to-Chair Transfer  Assistance Needed: Supervision or touching assistance  Comment: CGA  CARE Score: 4  Discharge Goal: Independent     Car Transfer  Assistance Needed: Supervision or touching assistance  Comment: slow, but able to do without physical assist, PT managed cathether bag  CARE Score: 4  Discharge Goal: Independent    Ambulation:   Device used PTA: 4ww   Walking Ability  Does the Patient Walk?: Yes     Walk 10 Feet  Assistance Needed: Supervision or touching assistance  Comment: CGA with 2ww.  Pt has very slow steps intiially with B knee flexion moderate, but improves as \"warms up\"  CARE Score: 4  Discharge Goal: Independent     Walk 50 Feet with Two Turns  Assistance Needed: Supervision or touching assistance  Comment: CG with 2ww, slow héctor with decreased TKE, leaning on walker with UEs, low foot clearance  CARE Score: 4  Discharge Goal: Independent     Walk 150 Feet  Comment: 72' max distance due to fatigue  Reason if not Attempted: Not attempted due to medical condition or safety concerns  CARE Score: 88  Discharge Goal: Independent     Walking 10 Feet on Uneven Surfaces  Assistance Needed: Supervision or touching assistance  Comment: CG with 2ww  CARE Score: 4  Discharge Goal: Independent     1 Step (Curb)  Assistance Needed: Supervision or touching assistance  Comment: supervision with 2ww  CARE Score: 4  Discharge Goal: Independent     4 Steps  Assistance Needed: Supervision or touching assistance  Comment: supervision with B rails  CARE Score: 4  Discharge Goal: Independent     12 Steps  Comment: Pt states he could not complete full flight without resting PTA  Reason if not Attempted: Not applicable  CARE Score: 9  Discharge Goal: Not Applicable    Gait Deviations: []None []Slow héctor  [] Increased TYSHAWN  [] Staggers []Deviated Path  [] Decreased step length  [] Decreased step height  []Decreased arm swing  [] Shuffles  [] Decreased head and trunk rotation  []other:        Wheelchair:  w/c Ability: Wheelchair Ability  Uses a Wheelchair and/or Scooter?: No                Balance:        Object: Picking Up Object  Assistance Needed: Supervision or touching assistance  Comment: supervision with 2ww  CARE Score: 4  Discharge Goal: Independent    Assessment:   The patient is a 78year old male admitted onto ARU after hospitalization for falls. pt diagnosed with UTI (pt has recurrent UTIs with multiple hospitalizations, summer 2021 UTi with CVA with SNF stay, has had suprapubic catheter for years). PTA, pt lives c son and is Ind c ADLs (uses leg bag during the day), some IADLs, and is mod I for functional transfers/mobility using a rollator. Pt demonstrates SBA to CG this date with limitations in speed, balance, activity tolerance with 2ww. Pt desires to progress to 4ww as he uses at home. Pt demonstrates some slight word finding and slow processing but upon questioning feel this may be residual from CVA this summer. Feel pt will benefit from ARU-PT to return to independence as he will be home alone for several hours per day.         Body structures, Functions, Activity limitations: Decreased functional mobility , Decreased balance, Decreased safe awareness, Decreased cognition, Decreased endurance, Decreased high-level IADLs     Prognosis: Good  Decision Making: Medium Complexity  Clinical Presentation: evolving characteristics      Patient education:   ARU schedule, ARU expectations for participation, plan of care,   Treatment Initiated:  Functional mobility training, gait training, patient education  Barriers to Improvement:  Chronic medical issues with many months of debility  Discharge Recommendations:  Home with son  Equipment Recommendations:  Possible 2ww    Goals:  Patient Goals   Patient goals : return home using 4ww     Long term goals  Time Frame for Long term goals : 7days  Long term goal 1: Pt will perform all bed mobility with independence  Long term goal 2: Pt will perform sit to stand, pivot and car transfers  with mod i  Long term goal 3: Pt will ambulate 150  feet on level surfaces and 10' on unlevel surfaces with mod I  using 4ww  Long term goal 4: Pt will ascend/descend curb step with   4ww   and up to 4    steps with  rail(s) with    mod I  Long term goal 5: Pt will retrieve light item from floor with mod i  using 4ww and reacher  Plan:    REQUIRES PT FOLLOW UP: Yes  Pt will be seen at least 60 minutes per day for a minimum of 5 days per week, plus group therapy as appropriate  Plan  Current Treatment Recommendations: Strengthening, ROM, Balance Training, Functional Mobility Training, Transfer Training, ADL/Self-care Training, Gait

## 2021-11-19 NOTE — PLAN OF CARE
Two staff skin assessment done by Diana Batista and myself. No skin issues found. Patient has Supra pubic catheter in place, draining cloudy yellow urine. Patient stated he has dry skin.

## 2021-11-19 NOTE — PLAN OF CARE
ARU Interdisciplinary Plan of Care [facility]  [address]st [address] [facility], [address]  [phone]  Fax: [phone]        Emmanuel Galvin    : 1940  Acct #: [[id number]]  MRN: [id number]   PHYSICIAN:  Shae Obrien MD  Primary Active Problems:   Active Hospital Problems    Diagnosis Date Noted    UTI (urinary tract infection) [N39.0] 2021       Rehabilitation Diagnosis:     Urinary tract infection, site not specified [N39.0]  UTI (urinary tract infection) [N39.0]       ADMIT DATE:2021   CARE PLAN     NURSING:  Emmanuel Galvin while on this unit will:      Bowel and Bladder   [x] Be continent of bowel and bladder      [] Have an adequate number of bowel movements   [x] Urinate with no urinary retention >300ml in bladder   [] Bladder Scan: (details)   [] Complete bladder protocol with mccrary removal   [] Initiate Bladder Program to toilet every ___ hours   [] Initiate Bowel Program to toilet every ___hours   [] Bladder training    [] Bowel training  Pulmonary   [x] Maintain O2 SATs at 92% or greater  Pain Management   [x] Have pain managed while on ARU        [] Be pain free by discharge    [x] Medication Management and Education  Maintenance of Skin Integrity/Wound Management   [x] Have no skin breakdown while on ARU   [] Have improved skin integrity via wound measurements   [] Have no signs/symptoms of infection via infection protection and monitoring at the          wound site  Fall Prevention   [x] Be free from injury during hospitalization via fall prevention measures     [x] Disease management and Education  Precautions   [] Weight Bearing Precautions   [] Swallowing Precautions   [x] Monitoring of Risks of Complications   [x] DVT Prophylaxis    [x] Fluid/electrolyte/Nutrition Management    [x] Complete education with patient/family with understanding demonstrated for          in-room safety with transfers to bed, toilet, wheelchair, shower as well as                bathroom activities and hygiene. [x] Adjustment   [] Other:   Nursing interventions may include bowel/bladder training, education for medical assistive devices, medication education, O2 saturation management, energy conservation, stress management techniques, fall prevention, alarms protocol, seating and positioning, skin/wound care, pressure relief instruction,dressing changes,  infection protection, DVT prophylaxis, and/or assistance with in room safety with transfers to bed, toilet, wheelchair, shower as well as bathroom activities and hygiene. Patient/caregiver education for:   [x] Disease/sustained injury/management      [x] Medication Use   [] Surgical intervention   [x] Safety/Precautions   [x] Body mechanics and or joint protection   [x] Health maintenance         PHYSICAL THERAPY:  Goals:                               Long term goals  Time Frame for Long term goals : 7days  Long term goal 1: Pt will perform all bed mobility with independence  Long term goal 2: Pt will perform sit to stand, pivot and car transfers  with mod i  Long term goal 3: Pt will ambulate 150  feet on level surfaces and 10' on unlevel surfaces with mod I  using 4ww  Long term goal 4: Pt will ascend/descend curb step with   4ww   and up to 4    steps with  rail(s) with    mod I  Long term goal 5: Pt will retrieve light item from floor with mod i  using 4ww and reacher  These goals were reviewed with this patient at the time of assessment and Jean Jenkins is in agreement. Plan of Care: Pt to be seen 5 days per week for a minimum of 60 minutes for 14 days.                 Current Treatment Recommendations: Strengthening, ROM, Balance Training, Functional Mobility Training, Transfer Training, ADL/Self-care Training, Gait Training, Patient/Caregiver Education & Training, IADL Training, Stair training, Equipment Evaluation, Education, & procurement, Neuromuscular Re-education, Home Exercise Program, Positioning, Endurance Training, Safety Education & Training, Cognitive/Perceptual Training community reintegration,animal assisted therapy, and concurrent/group therapy. PT IRF-LESTER scores and goals for initial assessment:   Bed Mobility:   Sit to Lying  Assistance Needed: Partial/moderate assistance  Comment: min assist for LEs into bed  CARE Score: 3  Discharge Goal: Independent  Roll Left and Right  Assistance Needed: Supervision or touching assistance  Comment: CGA with encouragement  CARE Score: 4  Discharge Goal: Independent  Lying to Sitting on Side of Bed  Assistance Needed: Partial/moderate assistance  Comment: min assist flat bed  CARE Score: 3  Discharge Goal: Independent    Transfers:    Sit to Stand  Assistance Needed: Supervision or touching assistance  Comment: CGA  CARE Score: 4  Discharge Goal: Independent  Chair/Bed-to-Chair Transfer  Assistance Needed: Supervision or touching assistance  Comment: CGA  CARE Score: 4  Discharge Goal: Independent     Car Transfer  Assistance Needed: Supervision or touching assistance  Comment: slow, but able to do without physical assist, PT managed cathether bag  CARE Score: 4  Discharge Goal: Independent    Ambulation:    Walking Ability  Does the Patient Walk?: Yes     Walk 10 Feet  Assistance Needed: Supervision or touching assistance  Comment: CGA with 2ww.  Pt has very slow steps intiially with B knee flexion moderate, but improves as \"warms up\"  CARE Score: 4  Discharge Goal: Independent     Walk 50 Feet with Two Turns  Assistance Needed: Supervision or touching assistance  Comment: CG with 2ww, slow héctor with decreased TKE, leaning on walker with UEs, low foot clearance  CARE Score: 4  Discharge Goal: Independent     Walk 150 Feet  Comment: 72' max distance due to fatigue  Reason if not Attempted: Not attempted due to medical condition or safety concerns  CARE Score: 88  Discharge Goal: Independent     Walking 10 Feet on Uneven Surfaces  Assistance Needed: Supervision or touching assistance  Comment: CG with 2ww  CARE Score: 4  Discharge Goal: Independent     1 Step (Curb)  Assistance Needed: Supervision or touching assistance  Comment: supervision with 2ww  CARE Score: 4  Discharge Goal: Independent     4 Steps  Assistance Needed: Supervision or touching assistance  Comment: supervision with B rails  CARE Score: 4  Discharge Goal: Independent     12 Steps  Comment: Pt states he could not complete full flight without resting PTA  Reason if not Attempted: Not applicable  CARE Score: 9  Discharge Goal: Not Applicable    Gait Deviations: []None []Slow héctor  [] Increased TYSHAWN  [] Staggers []Deviated Path  [] Decreased step length  [] Decreased step height  []Decreased arm swing  [] Shuffles  [] Decreased head and trunk rotation  []other:        Wheelchair:  w/c Ability: Wheelchair Ability  Uses a Wheelchair and/or Scooter?: No                Balance:        Object: Picking Up Object  Assistance Needed: Supervision or touching assistance  Comment: supervision with 2ww  CARE Score: 4  Discharge Goal: Independent  OCCUPATIONAL THERAPY:  Goals:             Short term goals  Time Frame for Short term goals: STGs=LTGs :  Long term goals  Time Frame for Long term goals : 7-10 days or until d/c  Long term goal 1: Pt will complete grooming tasks Ind  Long term goal 2: Pt will complete total body bathing mod I  Long term goal 3: Pt will complete UB dressing Ind  Long term goal 4: Pt will complete LB dressing Ind  Long term goal 5: Pt will doff/don footwear Ind  Long term goals 6: Pt will complete toileting mod I  Long term goal 7: Pt will complete functional transfers (bed, chair, toilet, shower) c DME PRN and mod I  Long term goal 8: Pt will perform therex/therax to facilitate increased strength/endurance/ax tolerance (c emphasis on dynamic standing balance/tolerance >8 mins, BUE endurance) c SBA  Long term goal 9: Pt will complete simple homemaking tasks c DME PRN and mod I :    These goals were reviewed with this patient at the time of assessment and Kimberly Herbert is in agreement    Plan of Care:  Pt to be seen 5 days per week for a minimum of 60 minutes for 10 days. Plan  Times per day: Daily  Current Treatment Recommendations: Strengthening, Balance Training, Functional Mobility Training, Endurance Training, Safety Education & Training, Patient/Caregiver Education & Training, Equipment Evaluation, Education, & procurement, Self-Care / ADL, Home Management Training         cognitive training, home management, energy conservation training, community reintegration, splint fabrication, patient/caregiver education and training, animal assisted therapy, and concurrent and/or group therapy. OT IRF-LESTER scores and goals for initial assessment:    ADLs:    Eating: Eating  Assistance Needed: Independent  Comment: able to open packages/containers  CARE Score: 6  Discharge Goal: Independent       Oral Hygiene: Oral Hygiene  Assistance Needed: Setup or clean-up assistance  Comment: seated  CARE Score: 5  Discharge Goal: Independent    UB/LB Bathing: Shower/Bathe Self  Assistance Needed: Supervision or touching assistance  Comment: majority performed seated, CGA in stance  CARE Score: 4  Discharge Goal: Independent    UB Dressing: Upper Body Dressing  Assistance Needed: Supervision or touching assistance  Comment: to don pullover T shirt  CARE Score: 4  Discharge Goal: Independent         LB Dressing: Lower Body Dressing  Assistance Needed: Partial/moderate assistance  Comment: pt able to thread BLEs in brief and manage catheter however demo'ed decreased problem solving to manage catheter through pants requiring assistance (typically uses leg bag at home).   Able to perform pants management up  CARE Score: 3  Discharge Goal: Independent    Donning and Vina Footwear: Putting On/Taking Off Footwear  Assistance Needed: Supervision or touching assistance  Comment: to [facility] socks, don personal shoes  CARE Score: 4  Discharge Goal: Independent      Toileting: Toileting Hygiene  Assistance Needed: Supervision or touching assistance  Comment: able to perform bowel hygiene, pants management c CGA  CARE Score: 4  Discharge Goal: Independent      Toilet Transfers: Toilet Transfer  Assistance Needed: Supervision or touching assistance  Comment: CGA c RW, grab bar  CARE Score: 4  Discharge Goal: Independent      SPEECH THERAPY: (If ordered)  Plan of Care and Goals:   LTG                                                         LTG:                           Treatments may include speech/language/communication therapy, cognitive training, animal assisted therapy, group therapy, education, and/or dysphagia therapy based on the above goals. Co-treats where appropriate with PT or OT to facilitate patient goals in functional tasks. These goals were reviewed with this patient at the time of assessment and Viki Mona is in agreement. CASE MANAGEMENT:  Goals:   Assist patient/family with discharge planning, patient/family counseling, assistance in obtaining recommended equipment and other services, and coordination with insurance during ARU stay. Patient Goals:   Return to maximum level of independent function. Activities Prior to Admit:   Homemaking Responsibilities: Yes (Shares with son)  Active : No                  Intensity of Therapy  Viki Dunn will be seen a minimum of 3 hours of therapy per day/a minimum of 5 out of 7 days per week. [] In this rare instance due to the nature of this patient's medical involvement, this patient will be seen 15 hours per week (900 minutes within a 7 day period).     Treatments may include therapeutic exercises, gait training, neuromuscular re-ed, transfer training, community reintegration, bed mobility, w/c mobility and training, self care, home mgmt, cognitive training, energy conservation,dysphagia tx, speech/language/communication therapy, group therapy, and patient/family education. In addition, dietician/nutritionist may monitor calorie count as well as intake and collaboratively work with SLP on dietary upgrades. Neuropsychology/Psychology may evaluate and provide necessary support. Group therapy as appropriate to facilitate improved endurance, STR, COORD, function, safety, transfers, awareness and insight into deficits, problem solving, memory, and social interaction and engagement. Medical issues being managed closely and that require 24 hour availability of a physician:   [] Swallowing Precautions                                     [] Weight bearing precautions   [] Wound Care                             [x] Infection Prevention   [x] DVT Prophylaxis/assessment              [x] Monitoring for complications    [x] Fall Precautions/Prevention                         [x] Fluid/Electrolyte/Nutrition Balance   [] Voice Protection                           [x] Medication Management   [x] Respiratory                   [x] Pain Mgmt   [x] Bowel/Bladder Fx    Medical Prognosis: [] Good  [x] Fair    [] Guarded   Total expected IRF days 14                                            Physician anticipated functional outcomes:  FWW and HHC OT/PT and supervision.   Rehab Goals:   [] Return to premorbid function of_______________________________.    [] Independent   [] Mod I  [x] Supervision  [] CGA   [] Min A   [] Mod A  Level for ambulation []without assistive device  [x] with assistive device        [] Independent   [] Mod I  [x] Supervision [] CGA   [] Min A   [] Mod A  Level for transfers []without assistive device  [x] with assistive device         [] Independent   [] Mod I  [x] Supervision [] CGA   [] Min A   [] Mod A Level with ADL's []without assistive device   [x] with assistive device     ___________________     Level with cognitive skills requiring [] No assist [x] Supervision [] Active Assist/Cues     [] Maximize level of mobility and ADL's to decrease burden on caregiver    IPOC brief synthesis of Preadmission Screen, Post-Admission Evaluation, and Therapy Evaluations: Acute inpatient rehabilitation with occupational and physical therapy 180 minutes 5 out of every 7 days. Will address basic and  advancing mobility with self-care instruction and adaptive equipment training. Caregiver education will be offered. Expected length of stay  prior to a supervised level of function for discharge home with a walker and C OT/PT is 2 weeks.     Additional recommendation:     1.  Urinary tract infection with gait disturbance and frequent falls: Patient requires daily occupational and physical therapy. He is on IV antibiotics for his urinary tract infection. He needs generalized strengthening and range of motion recovery exercises. Adaptive equipment training. Pulmonary hygiene measures. Nutritional support. Bowel retraining. Maximizing blood pressure and blood sugar control. DVT prophylaxis. 2. DVT prophylaxis: Lovenox 40 mg subcu daily. I must monitor his hemoglobin and platelet count periodically while on this medication. Weightbearing activities will be pursued daily. GI prophylaxis offered. 3. 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 He is on Lantus nightly and Humalog sliding scale. He is receiving oral Nesina and has his blood sugars checked at mealtime and bedtime. 4. Chronic kidney disease with recent exacerbation: Avoiding nephrotoxic medications. Avoiding great fluctuations of blood pressure. Hydralazine. Periodic monitoring of his chemistries. Encouraging oral hydration. 5. Hypertension: Norvasc, Apresoline and Normodyne with lisinopril. Target systolic blood pressure is 120-140. Vital signs are checked at rest and with activity.   6. Prostate cancer with suprapubic catheter: Outpatient follow-up with urology to develop a program for either every 3-week catheter replacements or weekly bladder flushing.     Anticipated discharge destination:    [] Home Independently   [x] Home with supervision    []SNF     [] Other       This plan has been reviewed with me in a language I understand.  I have had the opportunity to include my input with my therapy team.    ________________________________________________   ______________________  Patient/Significant Other      Date    I have reviewed this initial plan of care and agree with its contents:    Title   Name    Date    Time    Physician: Russell Alas MD 11/20/2021 7:50 AM    Case Mgmt:  Yaima Combs 11/19/2021 1424    OT: Parag Cope [address], OTR/L 11/19/2021 1311    PT: Loni Meyer, PT 11/19/21 1622(late entry for evaluation at time noted)    RN: Hunter Oneil RN 11/18/2021 [id number]    ST:    Dietician:

## 2021-11-19 NOTE — CARE COORDINATION
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  PROSTATE SURGERY      PROSTATECTOMY      infection     No Known Allergies  Precautions: falls and infections    Date of Admit: 2021  Room #: 1008/1008-A      Current functional status at time of admit:        Home Living/DME Available:      Type of Home: House  Home Access: Stairs to enter without rails  Bathroom Shower/Tub: Tub/Shower unit  Bathroom Toilet: Standard (also has vanity on R to assist with transfer)  Bathroom Equipment: Grab bars in shower, Shower chair, Hand-held shower, Grab bars around toilet  Home Equipment: Cane, Rolling walker, 4 wheeled walker, [place] (also has BSC but doesn't use)       IADL Hx:   Homemaking Responsibilities: Yes (Shares with son)  Active : No                Spouse:  in   Family:  Lives with his son    Comments:  Patient plans dc 1-level home with supportive son. Son doesn't work outside the home, but is frequently not at home. Patient reports [facility]. In interested in home delivered meals and home keeping services. Case mgt advised we'll have to see what his insurance will cover.   Two sons and one granddtr present during interview. No specific concerns at this time. Whiteboard updated. BIMS completed.     Noelle Avendano, 11/19/2021, 2:25 PM

## 2021-11-19 NOTE — PROGRESS NOTES
Occupational Therapy                              [facility] ARU OCCUPATIONAL THERAPY EVALUATION    Chart Review:  Past Medical History:   Diagnosis Date    Acute urinary tract infection 3/15/2012    Ataxia 2010    Diabetes mellitus ([address] 2011    type 2, controlled    Fusion of spine of cervical region 10/2012    Gait disturbance 2011    History of prostate cancer 1996    adenocarcinoma    History of tobacco use 1959    Hyperlipidemia 2011    Osteoarthritis 2011     Past Surgical History:   Procedure Laterality Date    BLADDER SURGERY      COLON SURGERY      OTHER SURGICAL HISTORY  3/28/13    Cysto with removal of artificial sphinter and placement of suprapubic catheter.  PROSTATE SURGERY      PROSTATECTOMY  1996    infection     Social History:  Social/Functional History  Lives With: Son Desiree Jones)  Type of Home: House  Home Layout: One level  Home Access: Stairs to enter without rails  Entrance Stairs - Number of Steps: 2 GERMAIN  Bathroom Shower/Tub: Tub/Shower unit  Bathroom Toilet: Standard (also has vanity on R to assist with transfer)  Bathroom Equipment: Grab bars in shower, Shower chair, Hand-held shower, Grab bars around toilet  Bathroom Accessibility: Walker accessible  Home Equipment: Cane, Rolling walker, 4 wheeled walker, [employer] (also has BSC but doesn't use)  Receives Help From: Home health ([facility] but just finished [facility] OT)  ADL Assistance: Independent  Homemaking Assistance: Independent  Homemaking Responsibilities: Yes (Shares with son)  Laundry Responsibility: Primary  Health Care Management: Primary (has pillbox with alarm)  Ambulation Assistance: Independent (mod I with 4ww)  Transfer Assistance: Independent  Active : No  Patient's  Info: Sons drive. \"They think I'm never gonna drive again, I've got news for them\"  Additional Comments: Pt typically sleeps in a flat, regular bed at home.   Pt reports multiple falls (>5) in the past week before hospitalization, but no injuries. Pt's wife recently passed away in September. Restrictions:  Restrictions/Precautions  Restrictions/Precautions: General Precautions, Fall Risk (suprapubic catheter)                  Pain Level: 5  Pain Location: Ankle, Leg    Objective:  Observation/Palpation  Posture: Fair  Observation: Pt in bed on approach, pleasant and agreeable to evaluation. Orientation  Overall Orientation Status: Within Normal Limits        Vision  Vision: Impaired  Vision Exceptions: Wears glasses for reading  Vision - Basic Assessment  Prior Vision: Wears glasses only for reading  Patient Visual Report: No visual complaint reported. Hearing  Hearing: Within functional limits    ROM:      LUE AROM (degrees)  LUE AROM : Exceptions  L Shoulder Flexion 0-180: ~90*  L Elbow Flexion 0-145: WFL     Left Hand AROM (degrees)  Left Hand AROM: WFL     RUE AROM (degrees)  RUE AROM : Exceptions  R Shoulder Flexion 0-180: ~75*, \"I think I have a rotator cuff problem\"  R Elbow Flexion 0-145: WFL     Right Hand AROM (degrees)  Right Hand AROM: WFL    Strength:    LUE Strength  Gross LUE Strength: Exceptions to [facility]  L Shoulder Flex: 2+/5  L Elbow Flex: 4+/5  L Hand General: 4+/5  RUE Strength  Gross RUE Strength: Exceptions to [facility]  R Shoulder Flex: 2/5  R Elbow Flex: 4+/5  R Hand General: 4+/5    Quality of Movement:   Tone RUE  RUE Tone: Normotonic  Tone LUE  LUE Tone: Normotonic  Coordination  Movements Are Fluid And Coordinated: Yes       Sensation:    Sensation  Overall Sensation Status: Impaired (reports numbness in fingers and toes)     ADLs:  Eating: Eating  Assistance Needed: Independent  Comment: able to open packages/containers  CARE Score: 6  Discharge Goal: Independent       Oral Hygiene: Oral Hygiene  Assistance Needed: Setup or clean-up assistance  Comment: seated  CARE Score: 5  Discharge Goal: Independent    UB/LB Bathing: Shower/Bathe Self  Assistance Needed: Supervision or touching assistance  Comment: majority performed seated, CGA in stance  CARE Score: 4  Discharge Goal: Independent    UB Dressing: Upper Body Dressing  Assistance Needed: Supervision or touching assistance  Comment: to don pullover T shirt  CARE Score: 4  Discharge Goal: Independent         LB Dressing:   Lower Body Dressing  Assistance Needed: Partial/moderate assistance  Comment: pt able to thread BLEs in brief and manage catheter however demo'ed decreased problem solving to manage catheter through pants requiring assistance (typically uses leg bag at home). Able to perform pants management up  CARE Score: 3  Discharge Goal: Independent    Donning and Lowgap Footwear: Putting On/Taking Off Footwear  Assistance Needed: Supervision or touching assistance  Comment: to doff hospital socks, don personal shoes  CARE Score: 4  Discharge Goal: Independent      Toileting: Toileting Hygiene  Assistance Needed: Supervision or touching assistance  Comment: able to perform bowel hygiene, pants management c CGA  CARE Score: 4  Discharge Goal: Independent      Bed Mobility:    Bed mobility  Supine to Sit: Modified independent    Transfers:    Transfers  Stand Pivot Transfers: Contact guard assistance  Sit to stand: Contact guard assistance  Stand to sit: Contact guard assistance  Transfer Comments: Ranged from CGA-SBA     Shower Transfers  Shower - Transfer Type: To and From  Shower - Transfer To:  Shower seat without back  Shower - Technique: Ambulating (c RW)  Shower Transfers: Contact Guard     Toilet Transfer  Assistance Needed: Supervision or touching assistance  Comment: CGA c RW, grab bar  CARE Score: 4  Discharge Goal: Independent    Functional Mobility:    Balance  Sitting Balance: Supervision  Standing Balance: Contact guard assistance  Standing Balance  Time: Multiple stands ~1 min each  Activity: ADLs  Comment: Pt able to maintain balance c 0 UE support however posture becomes more flexed  Functional Mobility  Functional - Mobility Device: Rolling Walker  Activity: To/from bathroom  Assist Level: Contact guard assistance     Cognition:  Cognition  Overall Cognitive Status: Exceptions  Problem Solving: Assistance required to identify errors made  Cognition Comment: Only occasional deficit noted; initiated and sequenced ADLs appropriately    Perception:  Perception  Overall Perceptual Status: WFL      Assessment:     Performance deficits / Impairments: Decreased functional mobility , Decreased ADL status, Decreased endurance, Decreased balance, Decreased high-level IADLs, Decreased posture, Decreased ROM, Decreased sensation    The patient is a 78year old male admitted onto ARU after hospitalization for falls at home; pt diagnosed with UTI (pt has recurrent UTIs, has had suprapubic catheter for years). PTA, pt lives c son and is Ind c ADLs (uses leg bag during the day), some IADLs, and is mod I for functional transfers/mobility using a rollator. Today, pt was able to complete most ADLs at a CGA-SBA level but required more assist for LB dressing d/t being unfamiliar managing a full size catheter bag through clothing; educated pt that it will require coordination with nursing to begin using a leg bag during the day. The QI, MMT, and ROM standardized assessments were used this date to determine the above performance deficits, which compromise pt's ability to safely complete ADLs/IADLs/mobility. Pt will benefit from ARU OT services to increase functional performance and return to PLOF. Decision Making: Medium Complexity  Clinical Presentation:  Evolving c changing characteristics (I.e. endurance)  Patient education:   ARU jami, Role of O.T., O.T. plan of care  []   Patient goal was established and reviewed in Rehab[facility]er with patient and/or family this date. REQUIRES OT FOLLOW UP: Yes  Discharge Recommendations:  Home c assist, [facility] OT?   Equipment Recommendations:  None    Goals:     Short term goals  Time Frame for Short term goals: STGs=LTGs  Long term goals  Time Frame for Long term goals : 7-10 days or until d/c  Long term goal 1: Pt will complete grooming tasks Ind  Long term goal 2: Pt will complete total body bathing mod I  Long term goal 3: Pt will complete UB dressing Ind  Long term goal 4: Pt will complete LB dressing Ind  Long term goal 5: Pt will doff/don footwear Ind  Long term goals 6: Pt will complete toileting mod I  Long term goal 7: Pt will complete functional transfers (bed, chair, toilet, shower) c DME PRN and mod I  Long term goal 8: Pt will perform therex/therax to facilitate increased strength/endurance/ax tolerance (c emphasis on dynamic standing balance/tolerance >8 mins, BUE endurance) c SBA  Long term goal 9: Pt will complete simple homemaking tasks c DME PRN and mod I    Plan:    Pt will be seen at least 60 minutes per day for a minimum of 5 days per week, plus group therapy as appropriate  Current Treatment Recommendations: Strengthening, Balance Training, Functional Mobility Training, Endurance Training, Safety Education & Training, Patient/Caregiver Education & Training, Equipment Evaluation, Education, & procurement, Self-Care / ADL, Home Management Training    OT Individual Minutes  Time In: 3109  Time Out: 1005  Minutes: 90                Number of Minutes/Billable Intervention      OT Evaluation 25   Therapeutic Exercise    ADL Self-care 65   Neuro Re-Ed    Therapeutic Activity    Group    Other:    TOTAL 90     Electronically signed by:    Richard Santa MS, OTR/L  License #[id number]   11/19/2021, 1:03 PM

## 2021-11-19 NOTE — PROGRESS NOTES
Physical Therapy    Physical Therapy Treatment Note  Name: Smita Webb MRN: [id number] :   1940   Date:  2021   Admission Date: 2021 Room:  [address] [place] [address]   Restrictions/Precautions:  Restrictions/Precautions  Restrictions/Precautions: General Precautions, Fall Risk         Subjective:  Patient states:  \"I would like to walk in the hallway\"  Pain:   Location, Type, Intensity (0/10 to 10/10):  denies    Objective:    Observation:  Pt sitting upright in chair upon entry    Treatment, including education/measures:  Pt agreeable to participating in therapy at this time. Therapeutic Activity Training:   Therapeutic activity training was instructed today. Cues were given for safety, sequence, UE/LE placement, awareness, and balance. Activities performed today included bed mobility training, sup-sit, sit-stand. Pt completed sit to stand from chair with minAx1 with verbal cues to push through chair and avoid pulling on walker. Pt completed stand to sit onto chair with minAx1 with verbal cues to feel chair against back of legs, reach back, and sit slowly. Gait Training:  Cues were given for safety, sequence, device management, balance, posture, awareness, path. Pt ambulated 40 feet + 40 feet with assist varying from CGAx1 to minAx1 with a front wheeled walker with a decreased gait speed, a decreased step length bilaterally, and an intermittent unsteady gait. Pt provided with verbal and tactile cues for BLE placement, walker placement, and sequence throughout ambulation. Pt provided with verbal and tactile cues to maintain upright posture in order to avoid COM shifting outside of TYSHAWN. Pt provided with verbal cues for directions in order to successfully navigate hallway and return to correct room. Safety  Patient left safely in the chair, with call light/phone in reach with alarm applied. Gait belt and mask were used for transfers and gait.         Assessment / Impression: Patient's tolerance of treatment:  Good; patient tolerated increased ambulation distance today  Adverse Reaction: none  Significant change in status and impact:  none  Barriers to improvement:  Generalized weakness    Plan for Next Session:    Continue progressing toward goals per plan of care. Progress independence with transfers and ambulation as tolerated and appropriate. Progress ambulation distance as tolerated and appropriate. Time in:  1300  Time out:  1344  Timed treatment minutes:  44  Total treatment time:  40    Previously filed items:  Social/Functional History  Lives With: Son  Type of Home: House  Home Layout: One level  Home Access: Stairs to enter without rails  Entrance Stairs - Number of Steps: 2  Bathroom Shower/Tub: Tub/Shower unit  Bathroom Toilet: Standard  Bathroom Equipment: Grab bars in shower, Shower chair  Home Equipment: Cane, Rolling walker, 4 wheeled walker  ADL Assistance: Independent  Homemaking Assistance: Independent  Ambulation Assistance: Independent (mod I with 4ww)  Transfer Assistance: [address] term goals  Time Frame for Long term goals :  In one week:  Long term goal 1: Pt will complete all bed mobility with SBAx1  Long term goal 2: Pt will complete sit <> stand transfers with SBAx1  Long term goal 3: Pt will ambulate 100 feet with SBAx1 with LRAD  Long term goal 4: Pt will ascend/descend 2 steps without a handrail with minAx1  Long term goal 5: Pt will independently complete 3 sets of 10 reps of BLE AROM exercises in available and allowed ROM    Electronically signed by:      Duane Bene, PT, DPT  License #: [id number]

## 2021-11-19 NOTE — H&P
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 No chills, cough or significant pain. History of present illness: Patient is an 68-year-old right-hand-dominant male who presented to our ED on 2021 after another in a series of falls at home. He was having positional dizziness with lightheadedness. He noted that his urine was becoming cloudy which has indicated an infection in the past. Patient was noted to be mildly dehydrated with a urinary tract infection and elevated creatinine. Blood pressures fluctuated with his blood sugar. Urology was consulted and his suprapubic catheter was exchanged. The patient admitted he was not motivated to regularly seek out catheter changes as an outpatient. He has become unable to do his own toileting and transfers and cannot return directly home. He requires inpatient rehabilitation to address these issues. Review of systems: Mild positional dizziness, poor appetite and poor sleep. Infrequent bowel movements. The remainder of their review of systems was negative except as mentioned in the history of present illness.     Social History: Lives With:  Barrett Gardner, home most of day)  Type of Home: House  Home Layout: One level  Home Access: Stairs to enter without rails  Entrance Stairs - Number of Steps: 2 GERMAIN  Bathroom Shower/Tub: Tub/Shower unit  Bathroom Toilet: Standard (also has vanity on R to assist with transfer)  Bathroom Equipment: Grab bars in shower, Shower chair, Hand-held shower, Grab bars around toilet  Bathroom Accessibility: Walker accessible  Home Equipment: Cane, Rolling walker, 4 wheeled walker, Arianna Saupe (also has BSC but doesn't use)  Receives Help From: Home health (Tyrell Hodge RN 1x/week but just finished [facility]rell [facility] OT)  ADL Assistance: Independent  Homemaking Assistance: Independent  Homemaking Responsibilities: Yes (Shares with son)  Laundry Responsibility: Primary  Health Care Management: Primary (has pillbox with alarm)  Ambulation Assistance: Independent (mod I with 4ww, walks short community distances, but uses electric carts in stores)  Transfer Assistance: Independent  Active : No  Patient's  Info: Sons drive. \"They think I'm never gonna drive again, I've got news for them\"  Additional Comments: Pt typically sleeps in a flat, regular bed at home. Pt reports multiple falls (>5) in the past week before hospitalization, but no injuries. Pt's wife recently passed away in September. He reports that he quit smoking about 45 years ago. He smoked 0.50 packs per day. He has never used smokeless tobacco. He reports that he does not drink alcohol and does not use drugs. Prior (baseline) level of function: Modified independent for mobility and self-care. Current level of function: Moderate physical assistance for mobility and self-care. Allergies:  Patient has no known allergies. Past Medical History:   Past Medical History:   Diagnosis Date    Acute urinary tract infection 3/15/2012    Ataxia 2010    Diabetes mellitus ([facility] [address] 2011    type 2, controlled    Fusion of spine of cervical region 10/2012    Gait disturbance 2011    History of prostate cancer 1996    adenocarcinoma    History of tobacco use 1959    Hyperlipidemia 2011    Osteoarthritis 2011        Past Surgical History:     Past Surgical History:   Procedure Laterality Date    BLADDER SURGERY      COLON SURGERY      OTHER SURGICAL HISTORY  3/28/13    Cysto with removal of artificial sphinter and placement of suprapubic catheter.     PROSTATE SURGERY      PROSTATECTOMY  1996    infection       Current Medications:     Current Facility-Administered Medications:     0.9 % sodium chloride infusion, 25 mL, IntraVENous, PRN, Donavan Kumari APRN - CNP    acetaminophen (TYLENOL) tablet 650 mg, 650 mg, Oral, Q6H PRN **OR** [DISCONTINUED] acetaminophen (TYLENOL) suppository 650 mg, 650 mg, Rectal, Q6H PRN, VIVIANA Dumont - CNP    ondansetron (ZOFRAN-ODT) disintegrating tablet 4 mg, 4 mg, Oral, Q8H PRN **OR** ondansetron (ZOFRAN) injection 4 mg, 4 mg, IntraVENous, Q6H PRN, VIVIANA Dumont - CNP    sodium chloride flush 0.9 % injection 5-40 mL, 5-40 mL, IntraVENous, 2 times per day, Donavan Kumari APRN - CNP    sodium chloride flush 0.9 % injection 5-40 mL, 5-40 mL, IntraVENous, PRN, Donavan Kumari APRN - CNP    dextrose 5 % solution, 100 mL/hr, IntraVENous, PRN, Donavan Kumari APRN - CNP    dextrose 50 % IV solution, 12.5 g, IntraVENous, PRN, VIVIANA Dumont - CNP    glucagon (rDNA) injection 1 mg, 1 mg, IntraMUSCular, PRN, VIVIANA Dumont - CNP    glucose (GLUTOSE) 40 % oral gel 15 g, 15 g, Oral, PRN, Donavan Kumari APRN - CNP    insulin lispro (HUMALOG) injection vial 0-3 Units, 0-3 Units, SubCUTAneous, Nightly, VIVIANA Dumont - CNP    [START ON 11/19/2021] insulin lispro (HUMALOG) injection vial 0-6 Units, 0-6 Units, SubCUTAneous, TID WC, VIVIANA Dumont - CNP    [START ON 11/19/2021] amLODIPine (NORVASC) tablet 10 mg, 10 mg, Oral, Daily, VIVIANA Dumont - CNP    docusate sodium (COLACE) capsule 100 mg, 100 mg, Oral, BID PRN, Donavan Kumari APRN - CNP    hydrALAZINE (APRESOLINE) tablet 25 mg, 25 mg, Oral, 3 times per day, VIVIANA Dumont CNP    insulin glargine (LANTUS) injection vial 10 Units, 10 Units, SubCUTAneous, Nightly, VIVIANA Dumont CNP    labetalol (NORMODYNE) tablet 200 mg, 200 mg, Oral, 2 times per day **OR** [DISCONTINUED] labetalol (NORMODYNE) tablet 100 mg, 100 mg, Oral, 2 times per day, Alice Foil, APRN - CNP    lisinopril (PRINIVIL;ZESTRIL) tablet 5 mg, 5 mg, Oral, Daily, Alice Foil, APRN - CNP    meropenem (MERREM) 1,000 mg in sodium chloride 0.9 % 100 mL IVPB (mini-bag), 1,000 mg, IntraVENous, Q12H, Alice Foil, APRN - CNP    oxybutynin (DITROPAN-XL) extended release tablet 5 mg, 5 mg, Oral, Nightly, Alice Foil, APRN - CNP    [START ON 11/19/2021] alogliptin (NESINA) tablet 6.25 mg, 6.25 mg, Oral, Daily, Alice Foil, APRN - CNP    [START ON 11/19/2021] enoxaparin (LOVENOX) injection 40 mg, 40 mg, SubCUTAneous, Daily, C Lorrie Scheuermann, MD    acetaminophen (TYLENOL) tablet 650 mg, 650 mg, Oral, Q4H PRN, C Lorrie Scheuermann, MD    polyethylene glycol [facility]) packet 17 g, 17 g, Oral, Daily PRN, C Lorrie Scheuermann, MD    bisacodyl (DULCOLAX) suppository 10 mg, 10 mg, Rectal, Daily PRN, C Lorrie Scheuermann, MD    Family History:   Family History   Problem Relation Age of Onset    Cancer Mother     Diabetes Father     Heart Disease Father     High Blood Pressure Father     Diabetes Sister     High Blood Pressure Sister     Cancer Brother         prostate, breast    Diabetes Brother     Cancer Maternal Uncle     Diabetes Maternal Grandmother     Stroke Maternal Grandfather        Exam:    Blood pressure 138/71, pulse 63, temperature 98.4 °F (36.9 °C), temperature source Oral, resp. rate 18, SpO2 97 %. General: Patient was seen sitting up in bed. He was alert but soft-spoken. Oriented x2-3. Fair reasoning but poor insight. In no distress. HEENT: Neck supple. Mucous membranes are little dry. No JVD or adenopathy. No signs of trauma to his head or neck. Pulmonary: Shallow respirations without wheezes or rales. Cardiac: Regular rate and rhythm. Abdomen: Patient's abdomen was soft and nondistended. Bowel sounds were present throughout. There was no rebound, guarding or masses noted. Spinal exam: Moist skin with no breakdown.  Guarded trunk rotation with his generalized weakness and girth. Upper extremities: The patient was able to bring both hands up to meet mine. Fair  strength. Normal tone with no reflexes. No tremor or atrophy. No significant bruising. Lower extremities: His calves are soft. Heels clear. No deep tendon reflexes. Diminished sensation about his feet. 3+/5 strength across the knees and ankles. Sitting balance was fair+. Standing balance was poor. Lab Results   Component Value Date    WBC 6.5 11/18/2021    HGB 9.6 (L) 11/18/2021    HCT 31.4 (L) 11/18/2021    MCV 94.0 11/18/2021     11/18/2021     Lab Results   Component Value Date    INR 0.99 05/19/2015    INR 1.31 03/23/2013    INR 1.37 01/24/2011    PROTIME 11.3 05/19/2015    PROTIME 14.2 03/23/2013    PROTIME 15.2 (H) 01/24/2011     Lab Results   Component Value Date    CREATININE 1.9 (H) 11/18/2021    BUN 30 (H) 11/18/2021     11/18/2021    K 4.6 11/18/2021     11/18/2021    CO2 22 11/18/2021     Lab Results   Component Value Date    ALT 16 11/17/2021    AST 18 11/17/2021    ALKPHOS 98 11/17/2021    BILITOT 0.3 11/17/2021         Impression: 64-year-old male with a history of prostate cancer and suprapubic catheter with diabetes and hypertension who is suffered a series of falls recently this seems to be associated with another urinary tract infection complicated by dehydration with deterioration of his CKD. Strengths for the patient: Supportive family, a reasonably accessible home and sometimes with adaptive equipment. Limitations/barriers for the patient: His age, his recent loss of his wife and his diabetes. Recommendation: Acute inpatient rehabilitation with occupational and physical therapy 180 minutes 5 out of every 7 days. Will address basic and  advancing mobility with self-care instruction and adaptive equipment training. Caregiver education will be offered.  Expected length of stay  prior to a supervised level of function for discharge home with a walker and [facility] OT/PT is 2 weeks. Additional recommendation:    1. Urinary tract infection with gait disturbance and frequent falls: Patient requires daily occupational and physical therapy. He is on IV antibiotics for his urinary tract infection. He needs generalized strengthening and range of motion recovery exercises. Adaptive equipment training. Pulmonary hygiene measures. Nutritional support. Bowel retraining. Maximizing blood pressure and blood sugar control. DVT prophylaxis. 2. DVT prophylaxis: Lovenox 40 mg subcu daily. I must monitor his hemoglobin and platelet count periodically while on this medication. Weightbearing activities will be pursued daily. GI prophylaxis offered. 3. 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 He is on Lantus nightly and Humalog sliding scale. He is receiving oral Nesina and has his blood sugars checked at mealtime and bedtime. 4. Chronic kidney disease with recent exacerbation: Avoiding nephrotoxic medications. Avoiding great fluctuations of blood pressure. Hydralazine. Periodic monitoring of his chemistries. Encouraging oral hydration. 5. Hypertension: Norvasc, Apresoline and Normodyne with lisinopril. Target systolic blood pressure is 120-140. Vital signs are checked at rest and with activity. 6. Prostate cancer with suprapubic catheter: Outpatient follow-up with urology to develop a program for either every 3-week catheter replacements or weekly bladder flushing. I personally performed a history and physical on this patient within 24 hours of admission to the rehab unit. I have reviewed the preadmission screening and concur with its findings without change. A detailed plan of care will be established by hospital day 4 and I attest the patient is appropriate for inpatient rehabilitation at this time.   I have compared the patient's current functional status noted during my history and physical with that of the preadmission screen and I have found no significant differences.

## 2021-11-20 LAB
GLUCOSE BLD-MCNC: 150 MG/DL (ref 70–99)
GLUCOSE BLD-MCNC: 230 MG/DL (ref 70–99)
GLUCOSE BLD-MCNC: 269 MG/DL (ref 70–99)
GLUCOSE BLD-MCNC: 384 MG/DL (ref 70–99)

## 2021-11-20 PROCEDURE — 6360000002 HC RX W HCPCS: Performed by: PHYSICAL MEDICINE & REHABILITATION

## 2021-11-20 PROCEDURE — 94761 N-INVAS EAR/PLS OXIMETRY MLT: CPT

## 2021-11-20 PROCEDURE — 2580000003 HC RX 258: Performed by: NURSE PRACTITIONER

## 2021-11-20 PROCEDURE — 6360000002 HC RX W HCPCS: Performed by: NURSE PRACTITIONER

## 2021-11-20 PROCEDURE — 97535 SELF CARE MNGMENT TRAINING: CPT

## 2021-11-20 PROCEDURE — 82962 GLUCOSE BLOOD TEST: CPT

## 2021-11-20 PROCEDURE — 97530 THERAPEUTIC ACTIVITIES: CPT

## 2021-11-20 PROCEDURE — 6370000000 HC RX 637 (ALT 250 FOR IP): Performed by: NURSE PRACTITIONER

## 2021-11-20 PROCEDURE — 6370000000 HC RX 637 (ALT 250 FOR IP): Performed by: PHYSICAL MEDICINE & REHABILITATION

## 2021-11-20 PROCEDURE — 97116 GAIT TRAINING THERAPY: CPT

## 2021-11-20 PROCEDURE — 94150 VITAL CAPACITY TEST: CPT

## 2021-11-20 PROCEDURE — 1280000000 HC REHAB R&B

## 2021-11-20 PROCEDURE — 76937 US GUIDE VASCULAR ACCESS: CPT

## 2021-11-20 PROCEDURE — 97110 THERAPEUTIC EXERCISES: CPT

## 2021-11-20 RX ADMIN — ACETAMINOPHEN 650 MG: 325 TABLET ORAL at 03:22

## 2021-11-20 RX ADMIN — SODIUM CHLORIDE, PRESERVATIVE FREE 10 ML: 5 INJECTION INTRAVENOUS at 09:58

## 2021-11-20 RX ADMIN — SODIUM CHLORIDE, PRESERVATIVE FREE 10 ML: 5 INJECTION INTRAVENOUS at 20:56

## 2021-11-20 RX ADMIN — ENOXAPARIN SODIUM 40 MG: 100 INJECTION SUBCUTANEOUS at 09:39

## 2021-11-20 RX ADMIN — MEROPENEM 1000 MG: 1 INJECTION INTRAVENOUS at 09:55

## 2021-11-20 RX ADMIN — ALOGLIPTIN 6.25 MG: 12.5 TABLET, FILM COATED ORAL at 09:37

## 2021-11-20 RX ADMIN — LABETALOL HYDROCHLORIDE 200 MG: 100 TABLET, FILM COATED ORAL at 20:50

## 2021-11-20 RX ADMIN — LISINOPRIL 5 MG: 5 TABLET ORAL at 09:38

## 2021-11-20 RX ADMIN — INSULIN LISPRO 1 UNITS: 100 INJECTION, SOLUTION INTRAVENOUS; SUBCUTANEOUS at 09:29

## 2021-11-20 RX ADMIN — MEROPENEM 1000 MG: 1 INJECTION INTRAVENOUS at 21:02

## 2021-11-20 RX ADMIN — AMLODIPINE BESYLATE 10 MG: 10 TABLET ORAL at 09:38

## 2021-11-20 RX ADMIN — INSULIN GLARGINE 10 UNITS: 100 INJECTION, SOLUTION SUBCUTANEOUS at 20:51

## 2021-11-20 RX ADMIN — INSULIN LISPRO 5 UNITS: 100 INJECTION, SOLUTION INTRAVENOUS; SUBCUTANEOUS at 17:41

## 2021-11-20 RX ADMIN — LABETALOL HYDROCHLORIDE 200 MG: 100 TABLET, FILM COATED ORAL at 09:37

## 2021-11-20 RX ADMIN — HYDRALAZINE HYDROCHLORIDE 25 MG: 25 TABLET ORAL at 06:14

## 2021-11-20 RX ADMIN — INSULIN LISPRO 2 UNITS: 100 INJECTION, SOLUTION INTRAVENOUS; SUBCUTANEOUS at 13:04

## 2021-11-20 RX ADMIN — HYDRALAZINE HYDROCHLORIDE 25 MG: 25 TABLET ORAL at 20:50

## 2021-11-20 RX ADMIN — HYDRALAZINE HYDROCHLORIDE 25 MG: 25 TABLET ORAL at 13:27

## 2021-11-20 ASSESSMENT — PAIN SCALES - GENERAL
PAINLEVEL_OUTOF10: 0
PAINLEVEL_OUTOF10: 3
PAINLEVEL_OUTOF10: 0

## 2021-11-20 NOTE — FLOWSHEET NOTE
[x] daily progress note       [] discharge       Patient Name:  Puneet Rodriguez   :  1940 MRN: [id number]  Room:  [address] [place], [place] [address] Date of Admission: 2021  Rehabilitation Diagnosis:   Urinary tract infection, site not specified [N39.0]  UTI (urinary tract infection) [N39.0]       Date 2021       Day of ARU Week:  3   Time IN/OUT 7738-3592   Individual Tx Minutes 60   TOTAL Tx Time Mins 60   Restrictions Restrictions/Precautions  Restrictions/Precautions: Fall Risk, Contact Precautions (suprapubic catheter with ESBL)      Communication with other providers: [x]   OK to see per nursing:     []   Spoke with team member regarding:      Subjective observations and cognitive status:  Pt was alert and agreeable to treatment session. Pt was seated in recliner. Pt stated his leg has felt a little better after PT applied Biofreeze this morning. Pain level/location:   4 /10       Location: L LE   Discharge recommendations  Anticipated discharge date:  TBD  Destination: []home alone   []home alone with assist PRN     [] home w/ family      [] Continuous supervision  []SNF    [] Assisted living     [] Other:   Continued therapy: []C PT  []OUTPATIENT  PT   [] No Further PT  Equipment needs: TBD     Transfers:    Sit--> Stand:  SBA  Stand --> Sit:   SBA-CGA (as pt becomes fatigued)  Chair-->Bed/Bed --> Chair x 3 trials:   CGA    Assistive device required for transfer:   Rolator  Min cuing for hand placement required when patients becomes fatigued. Gait:    Distance:  117'+200'(1 standing rest break)+170'(w/ 3 L turns)   Assistance:  CGA  Device:  Rolator  Gait Quality:  Slow reciprocal, decreased step height w/ cues given to not shuffle feet.      Uneven Surfaces:       Assistance:   CGA  Device:    rolator  Surfaces Completed:   [x]  carpet with bean bags beneath      []  Throw rugs       []  Ramp       []  Outdoor pavements        []  Grass             []  Loose gravel        [x]  Other: 2\" curb step Min cuing for decent of curb step to keep breaks of Rolator locked. Additional Therapeutic activities/exercises completed this date:     []   Nu-step:  Time:        Level:         #Steps:       []   Rebounder:    []  Seated     []  Standing        []   Balance training         []   Postural training    []   Supine ther ex (reps/sets):     []   Seated ther ex (reps/sets):     []   Standing ther ex (reps/sets):     []   Picking up object from floor (standing):                   []   Reacher used   []   Other:   [x]   Other: Applied biofreeze for pain relief to L LE (knee and ankle) d/t increase of soreness by end of treatment session. Patient/Caregiver Education and Training:   [x]   Bed Mobility/Transfer technique/safety  [x]   Gait technique/sequencing  []   Proper use of assistive device  [x]   Advanced mobility safety and technique  []   Reinforced patient's precautions/mobility while maintaining precautions  []   Postural awareness  []   Family training  []   Progress was updated and reviewed in Rehabtracker with patient and/or family this date. Treatment Plan for Next Session: Gait progression, balance, therapeutic exercises, transfers      Assessment: This pt demonstrated a positive response to today's treatment as evidenced by improved gait technique and distances. The patient is making good progress toward established goals as evidenced by QI scores. Ongoing deficits are observed in the areas of balance and endurance and continued focus on balance, strengthening, gait is recommended.      Treatment/Activity Tolerance:   [x] Tolerated treatment with no adverse effects    [] Patient limited by fatigue  [] Patient limited by pain   [] Patient limited by medical complications:    [] Adverse reaction to Tx:   [] Significant change in status    Safety:       []  bed alarm set    [x]  chair alarm set    []  Pt refused alarms                []  Telesitter activated      [x]  Gait belt used during tx session      [x]other: Pt left sitting up in recliner at end of treatment session w/ call light and family present in room. Number of Minutes/Billable Intervention  Gait Training 45   Therapeutic Exercise    Neuro Re-Ed    Therapeutic Activity 15   Wheelchair Propulsion    Group    Other:    TOTAL 60         Social History  Social/Functional History  Lives With:  Morris Keith, home most of day)  Type of Home: House  Home Layout: One level  Home Access: Stairs to enter without rails  Entrance Stairs - Number of Steps: 2 GERMAIN  Bathroom Shower/Tub: Tub/Shower unit  Bathroom Toilet: Standard (also has vanity on R to assist with transfer)  Bathroom Equipment: Grab bars in shower, Shower chair, Hand-held shower, Grab bars around toilet  Bathroom Accessibility: Walker accessible  Home Equipment: Cane, Rolling walker, 4 wheeled walker, Rozetta Endo (also has BSC but doesn't use)  Receives Help From: Home health ([facility] but just finished [facility])  ADL Assistance: [address] [place] [address] Avenue: Independent  Homemaking Responsibilities: Yes (Shares with son)  Laundry Responsibility: Primary  Health Care Management: Primary (has pillbox with alarm)  Ambulation Assistance: Independent (mod I with 4ww, walks short community distances, but uses electric carts in stores)  Transfer Assistance: Independent  Active : No  Patient's  Info: Sons drive. \"They think I'm never gonna drive again, I've got news for them\"  Additional Comments: Pt typically sleeps in a flat, regular bed at home. Pt reports multiple falls (>5) in the past week before hospitalization, but no injuries. Pt's wife recently passed away in September. Objective                                                                                    Goals:  (Update in navigator)   :   Long term goals  Time Frame for Long term goals : 7days  Long term goal 1: Pt will perform all bed mobility with independence  Long term goal 2: Pt will perform sit to stand, pivot and car transfers  with mod i  Long term goal 3: Pt will ambulate 150  feet on level surfaces and 10' on unlevel surfaces with mod I  using 4ww  Long term goal 4: Pt will ascend/descend curb step with   4ww   and up to 4    steps with  rail(s) with    mod I  Long term goal 5: Pt will retrieve light item from floor with mod i  using 4ww and reacher:        Plan of Care                                                                              Times per week: 5 days per week for a minimum of 60 minutes/day plus group as appropriate for 60 minutes.   Treatment to include Current Treatment Recommendations: Strengthening, ROM, Balance Training, Functional Mobility Training, Transfer Training, ADL/Self-care Training, Gait Training, Patient/Caregiver Education & Training, IADL Training, Stair training, Equipment Evaluation, Education, & procurement, Neuromuscular Re-education, Home Exercise Program, Positioning, Endurance Training, Safety Education & Training, Cognitive/Perceptual Training    Electronically signed by   Jen Sue PTA, [id number]    11/20/2021, 8:15 AM

## 2021-11-20 NOTE — CONSULTS
Consult completed. PIV initiated and dressing applied. Pt tolerated well. Tracie Ridley, primary RN notified.

## 2021-11-20 NOTE — PROGRESS NOTES
Occupational Therapy    Physical Rehabilitation: OCCUPATIONAL THERAPY     [x] daily progress note       [] discharge       Patient Name:  Victorina Guevara   :  1940 MRN: [id number]  Room:  [address] [place] [address] Date of Admission: 2021  Rehabilitation Diagnosis:   Urinary tract infection, site not specified [N39.0]  UTI (urinary tract infection) [N39.0]       Date 2021       Day of ARU Week:  3   Time IN/OUT 1000/1100   Individual Tx Minutes 60   Group Tx Minutes    Co-Treat Minutes    Concurrent Tx Minutes    TOTAL Tx Time Mins 60   Variance Time    Variance Time []   Refusal due to:     []   Medical hold/reason:    []   Illness   []   Off Unit for test/procedure  []   Extra time needed to complete task  []   Therapeutic need  []   Other (specify):   Restrictions Restrictions/Precautions: Fall Risk, Contact Precautions (suprapubic catheter with ESBL)         Communication with other providers: [x]   OK to see per nursing:     []   Spoke with team member regarding:      Subjective observations and cognitive status: Pt in bed on approach, pleasant and agreeable to tx session. Only able to complete partial ADL d/t active IV   Pain level/location:    /10       Location: Denied   Discharge recommendations  Anticipated discharge date:  TBD  Destination: []home alone   []home alone w assist prn   [x] home w/ family    [] Continuous supervision       []SNF    [] Assisted living     [] Other:   Continued therapy: [x]HHC OT  []OUTPATIENT  OT   [] No Further OT  Equipment needs: None       ADLs:      Oral Hygiene: Oral Hygiene  Assistance Needed: Setup or clean-up assistance  Comment: seated  CARE Score: 5  Discharge Goal: Independent    UB/LB Bathing: Shower/Bathe Self  Assistance Needed: Supervision or touching assistance  Comment: only able to perform partial sponge bath sinkside 2* active IV.   CGA in stance for perineal bathing  CARE Score: 4  Discharge Goal: Independent    UB Dressing: Unable to perform 2* active IV      LB Dressing: Lower Body Dressing  Assistance Needed: Partial/moderate assistance  Comment: required assist to manage LLE and catheter through brief and pants; able to thread RLE and perform pants management up  CARE Score: 3  Discharge Goal: Independent    Toileting: Denied need    Bed Mobility:           []   Pt received out of bed   Supine --> Sit:  Supervision, assist for mccrary and IV line      Transfers:    Sit--> Stand:  CGA-SBA  Stand --> Sit:   CGA  Stand-Pivot:  CGA  Other:    Assistive device required for transfer:   W/C      Functional Mobility:    Assistance:  Used BUEs/BLEs to propel chair ~50 ft  Device:   []   Rolling Walker     []   Standard Walker [x]   Wheelchair        []   [employer]       []   4-Wheeled Humera Murray         []   Cardiac Walker       []   Other:        Additional Therapeutic activities/exercises completed this date:     [x]   ADL Training   [x]   Balance/Postural training     [x]   Bed/Transfer Training   [x]   Endurance Training   []   Neuromuscular Re-ed   []   Nu-step:  Time:        Level:         #Steps:       []   Rebounder:    []  Seated     []  Standing        []   Supine Ther Ex (reps/sets):     [x]   Seated Ther Ex (reps/sets): To increase BUE endurance for ADLs and transfers pt completed 7 mins on arm ergometer, min-mod resistance c 0 rest breaks, average 30 RPM   []   Standing Ther Ex (reps/sets):     []   Other:      Comments: All intervention performed to increase pt's endurance, ax tolerance, balance, and I c ADLs/IADLs and functional transfers/mobility. Patient/Caregiver Education and Training:   []   [employer] Equipment Use  [x]   Bed Mobility/Transfer Technique/Safety  []   Energy Conservation Tips  []   Family training  [x]   Postural Awareness  [x]   Safety During Functional Activities  []   Reinforced Patient's Precautions   []   Progress was updated and reviewed in Rehabtracker with patient and/or family this         date.     Treatment Plan for Next Session: Continue OT POC      Assessment: This pt demonstrated a positive response to today's treatment as evidenced by good engagement. The patient is making progress toward established goals as evidenced by QI scores. Ongoing deficits are observed in the areas of endurance, balance and continued focus on this is recommended.        Treatment/Activity Tolerance:   [x] Tolerated treatment with no adverse effects    [] Patient limited by fatigue  [] Patient limited by pain   [] Patient limited by medical complications:    [] Adverse reaction to Tx:   [] Significant change in status    Safety:       []  bed alarm set    []  chair alarm set    []  Pt refused alarms                []  Telesitter activated      [x]  Gait belt used during tx session      [x]other:  Handoff to PT     Number of Minutes/Billable Intervention  Therapeutic Exercise 10   ADL Self-care 50   Neuro Re-Ed    Therapeutic Activity    Group    Other:    TOTAL 60       Social History  Social/Functional History  Lives With:  Denisha John, home most of day)  Type of Home: House  Home Layout: One level  Home Access: Stairs to enter without rails  Entrance Stairs - Number of Steps: 2 GERMAIN  Bathroom Shower/Tub: Tub/Shower unit  Bathroom Toilet: Standard (also has vanity on R to assist with transfer)  Bathroom Equipment: Grab bars in shower, Shower chair, Hand-held shower, Grab bars around toilet  Bathroom Accessibility: Walker accessible  Home Equipment: Cane, Rolling walker, 4 wheeled walker, [employer] (also has BSC but doesn't use)  Receives Help From: Home health (Tyrell Hodge RN 1x/week but just finished Tyrell Hodge OT)  ADL Assistance: Independent  Homemaking Assistance: Independent  Homemaking Responsibilities: Yes (Shares with son)  Laundry Responsibility: Primary  Health Care Management: Primary (has pillbox with alarm)  Ambulation Assistance: Independent (mod I with 4ww, walks short community distances, but uses electric carts in stores)  Transfer Assistance: Independent  Active : No  Patient's  Info: Sons drive. \"They think I'm never gonna drive again, I've got news for them\"  Additional Comments: Pt typically sleeps in a flat, regular bed at home. Pt reports multiple falls (>5) in the past week before hospitalization, but no injuries. Pt's wife recently passed away in September. Objective                                                                                    Goals:  (Update in navigator)  Short term goals  Time Frame for Short term goals: STGs=LTGs:  Long term goals  Time Frame for Long term goals : 7-10 days or until d/c  Long term goal 1: Pt will complete grooming tasks Ind  Long term goal 2: Pt will complete total body bathing mod I  Long term goal 3: Pt will complete UB dressing Ind  Long term goal 4: Pt will complete LB dressing Ind  Long term goal 5: Pt will doff/don footwear Ind  Long term goals 6: Pt will complete toileting mod I  Long term goal 7: Pt will complete functional transfers (bed, chair, toilet, shower) c DME PRN and mod I  Long term goal 8: Pt will perform therex/therax to facilitate increased strength/endurance/ax tolerance (c emphasis on dynamic standing balance/tolerance >8 mins, BUE endurance) c SBA  Long term goal 9: Pt will complete simple homemaking tasks c DME PRN and mod I:        Plan of Care                                                                              Times per week: 5 days per week for a minimum of 60 minutes/day plus group as appropriate for 60 minutes. Treatment to include Plan  Times per day: Daily  Current Treatment Recommendations: Strengthening, Balance Training, Functional Mobility Training, Endurance Training, Safety Education & Training, Patient/Caregiver Education & Training, Equipment Evaluation, Education, & procurement, Self-Care / ADL, Home Management Training    Electronically signed by   Cullen Alatorre MS, OTR/L  License #[id number]  11/20/2021, 10:59 AM

## 2021-11-20 NOTE — PROGRESS NOTES
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 Fair appetite. No new pain. Prior (baseline) level of function: Independent.     Current level of function:         Current  IRF-LESTER and Goals:   Occupational Therapy:    Short term goals  Time Frame for Short term goals: STGs=LTGs :   Long term goals  Time Frame for Long term goals : 7-10 days or until d/c  Long term goal 1: Pt will complete grooming tasks Ind  Long term goal 2: Pt will complete total body bathing mod I  Long term goal 3: Pt will complete UB dressing Ind  Long term goal 4: Pt will complete LB dressing Ind  Long term goal 5: Pt will doff/don footwear Ind  Long term goals 6: Pt will complete toileting mod I  Long term goal 7: Pt will complete functional transfers (bed, chair, toilet, shower) c DME PRN and mod I  Long term goal 8: Pt will perform therex/therax to facilitate increased strength/endurance/ax tolerance (c emphasis on dynamic standing balance/tolerance >8 mins, BUE endurance) c SBA  Long term goal 9: Pt will complete simple homemaking tasks c DME PRN and mod I :                                       Eating: Eating  Assistance Needed: Independent  Comment: able to open packages/containers  CARE Score: 6  Discharge Goal: Independent       Oral Hygiene: Oral Hygiene  Assistance Needed: Setup or clean-up assistance  Comment: seated  CARE Score: 5  Discharge Goal: Independent    UB/LB Bathing: Shower/Bathe Self  Assistance Needed: Supervision or touching assistance  Comment: majority performed Supervision or touching assistance  Comment: CGA with encouragement  CARE Score: 4  Discharge Goal: Independent  Lying to Sitting on Side of Bed  Assistance Needed: Partial/moderate assistance  Comment: min assist flat bed  CARE Score: 3  Discharge Goal: Independent    Transfers:    Sit to Stand  Assistance Needed: Supervision or touching assistance  Comment: CGA  CARE Score: 4  Discharge Goal: Independent  Chair/Bed-to-Chair Transfer  Assistance Needed: Supervision or touching assistance  Comment: CGA  CARE Score: 4  Discharge Goal: Independent     Car Transfer  Assistance Needed: Supervision or touching assistance  Comment: slow, but able to do without physical assist, PT managed cathether bag  CARE Score: 4  Discharge Goal: Independent    Ambulation:    Walking Ability  Does the Patient Walk?: Yes     Walk 10 Feet  Assistance Needed: Supervision or touching assistance  Comment: CGA with 2ww.  Pt has very slow steps intiially with B knee flexion moderate, but improves as \"warms up\"  CARE Score: 4  Discharge Goal: Independent     Walk 50 Feet with Two Turns  Assistance Needed: Supervision or touching assistance  Comment: CG with 2ww, slow héctor with decreased TKE, leaning on walker with UEs, low foot clearance  CARE Score: 4  Discharge Goal: Independent     Walk 150 Feet  Comment: 72' max distance due to fatigue  Reason if not Attempted: Not attempted due to medical condition or safety concerns  CARE Score: 88  Discharge Goal: Independent     Walking 10 Feet on Uneven Surfaces  Assistance Needed: Supervision or touching assistance  Comment: CG with 2ww  CARE Score: 4  Discharge Goal: Independent     1 Step (Curb)  Assistance Needed: Supervision or touching assistance  Comment: supervision with 2ww  CARE Score: 4  Discharge Goal: Independent     4 Steps  Assistance Needed: Supervision or touching assistance  Comment: supervision with B rails  CARE Score: 4  Discharge Goal: Independent     12 Steps  Comment: Pt states he could not complete full flight without resting PTA  Reason if not Attempted: Not applicable  CARE Score: 9  Discharge Goal: Not Applicable       Wheelchair:  w/c Ability: Wheelchair Ability  Uses a Wheelchair and/or Scooter?: No                Balance:        Object: Picking Up Object  Assistance Needed: Supervision or touching assistance  Comment: supervision with 2ww  CARE Score: 4  Discharge Goal: Independent    I      Exam:    Blood pressure (!) 144/66, pulse 61, temperature 97.6 °F (36.4 °C), temperature source Oral, resp. rate 16, height 5' 8\" (1.727 m), weight 197 lb 12 oz (89.7 kg), SpO2 98 %. General: Lying back in bed. Quiet. Able to answer direct questions. HEENT: Neck supple. MMM. No JVD. Pulmonary: Shallow respirations with symmetric air exchange. Cardiac: Regular rate and rhythm. Abdomen: Patient's abdomen is soft and nondistended. Bowel sounds were present throughout. There was no rebound, guarding or masses noted. Suprapubic cath in place. Upper extremities: Able to bring both hands together in the midline. Fair dexterity. 4 -/5 strength across the elbows and wrist.    Lower extremities: Trace edema. Heels clear. No signs of DVT. Sitting balance was fair+. Standing balance was poor.     Lab Results   Component Value Date    WBC 6.5 11/18/2021    HGB 9.6 (L) 11/18/2021    HCT 31.4 (L) 11/18/2021    MCV 94.0 11/18/2021     11/18/2021     Lab Results   Component Value Date    INR 0.99 05/19/2015    INR 1.31 03/23/2013    INR 1.37 01/24/2011    PROTIME 11.3 05/19/2015    PROTIME 14.2 03/23/2013    PROTIME 15.2 (H) 01/24/2011     Lab Results   Component Value Date    CREATININE 1.7 (H) 11/19/2021    BUN 27 (H) 11/19/2021     11/19/2021    K 4.6 11/19/2021     11/19/2021    CO2 22 11/19/2021     Lab Results   Component Value Date    ALT 16 11/17/2021    AST 18 11/17/2021    ALKPHOS 98 11/17/2021    BILITOT 0.3 11/17/2021       Expected length of stay  prior to a supervised level of function for discharge home with a walker and [facility] OT/PT is 2 weeks. Recommendations:    1. Urinary tract infection with gait disturbance and frequent falls: Developing the routine for his daily occupational and physical therapy. Continuing the IV antibiotics for his urinary tract infection. He needs generalized strengthening and range of motion recovery exercises. Planning for adaptive equipment training. Pulmonary hygiene measures. Nutritional support. Bowel retraining. Maximizing blood pressure and blood sugar control. DVT prophylaxis. Verbal cues and moderate physical assistance for transfers. 2. DVT prophylaxis: Lovenox 40 mg subcu daily. I must monitor his hemoglobin and platelet count periodically while on this medication. Weightbearing activities are pursued daily. GI prophylaxis offered. No clinical signs of blood loss. 3. 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 He is on Lantus nightly and Humalog sliding scale. He is receiving oral Nesina and has his blood sugars checked at mealtime and bedtime. 4. Chronic kidney disease with recent exacerbation: Avoiding nephrotoxic medications. Avoiding great fluctuations of blood pressure. Hydralazine. Periodic monitoring of his chemistries. Encouraging oral hydration. 5. Hypertension: Norvasc, Apresoline and Normodyne with lisinopril. Target systolic blood pressure is 120-140. Vital signs are checked at rest and with activity. Blood pressure just above target range. 6. Prostate cancer with suprapubic catheter: Outpatient follow-up with urology to develop a program for either every 3-week catheter replacements or weekly bladder flushing. Catheter site looks appropriate.

## 2021-11-20 NOTE — PROGRESS NOTES
Physical Therapy    [x] daily progress note       [] discharge       Patient Name:  Adali Garcia   :  1940 MRN: [id number]  Room:  [address] [place] [address] Date of Admission: 2021  Rehabilitation Diagnosis:   Urinary tract infection, site not specified [N39.0]  UTI (urinary tract infection) [N39.0]       Date 2021       Day of ARU Week:  3   Time IN/OUT 1104/1204   Individual Tx Minutes 60   Group Tx Minutes    Co-Treat Minutes    Concurrent Tx Minutes    TOTAL Tx Time Mins 60   Variance Time    Variance Time []   Refusal due to:     []   Medical hold/reason:    []   Illness   []   Off Unit for test/procedure  []   Extra time needed to complete task  []   Therapeutic need  []   Other (specify):   Restrictions Restrictions/Precautions  Restrictions/Precautions: Fall Risk, Contact Precautions (suprapubic catheter with ESBL)      Interdisciplinary communication [x]   Cleared for therapy per nursing     []   RN notified about issues during session  []   RN updated on pt performance  []   Spoke with   []   Spoke with OT  []   Spoke with MD  []   Other:    Subjective observations and cognitive status: Pt resting in w/c, alert and pleasant, states Tylenol does not fully alleviate his pain, mccrary catheter in place.       Pain level/location: 4/10       Location: L lower leg down to ankle  (lightning-like sensation)   Discharge recommendations  Anticipated discharge date: TBD  Destination: []home alone   []home alone with assist PRN     [x] home w/ family      [] Continuous supervision  []SNF    [] Assisted living     [] Other:  Continued therapy: [x]C PT  []OUTPATIENT PT   [] No Further PT  []SNF PT  Caregiver training recommended: []Yes  [] No   Equipment needs: has RW and rollator      Bed Mobility:           [x]   Pt received out of bed     Transfers:    Sit--> Stand:  SBA (pt takes part in readjusting mccrary catheter line/bag around LLE and positioning it on AD prior to standing; pt initiates proper management of hand brakes or rollator prior to standing)   Stand --> Sit:   CGA (pt initiates proper management of hand brakes of rollator prior to sitting)   Assistive device used for transfer:  2WW, rollator     Gait:    Distance:  72 ft, 59 ft (both max; limited by fatigue)    Assistance:  CGA   Device:  2WW  Gait Quality:  Step-through, slow héctor     Gait:    Distance:  130 ft with turns (max; limited by fatigue)    Assistance:  4WW  Device:  RW  Gait Quality:  Step-through, slow héctor     Additional Therapeutic activities/exercises completed this date:     []   Nu-step:  Time:        Level:         #Steps:       []   Rebounder:    []  Seated     []  Standing        []   Balance training         []   Postural training    []   Supine ther ex (reps/sets):     []   Seated ther ex (reps/sets):     []   Standing ther ex (reps/sets):     []   Other: Toileting activity completed with    [x]   Other: Biofreeze applied on L lower leg and ankle for pain management    Comments:      Patient/Caregiver Education and Training:   []   Role of PT  []   Education about Dx  [x]   Use of call light for assist   []   HEP provided and explained   [x]   Treatment plan reviewed  []   Home safety  []   Wheelchair mobility/management   []   Body mechanics  []   Bed Mobility/Transfer technique  []   Gait technique/sequencing  []   Proper use of assistive device/adaptive equipment  []   Stair training/Advanced mobility safety and technique  []   Reinforced patient's precautions/mobility while maintaining precautions  []   Postural awareness  []   Family/caregiver training  []   Progress was updated and reviewed in Rehabtracker with patient and/or family this date. [x]   Other: importance of self-monitoring of Sx during mobility tasks for increased awareness and safety     Treatment Plan for Next Session: continue mobility training using rollator       Assessment:   This pt demonstrated a positive response to today's treatment as evidenced by steady gait quality and increased ambulation tolerance over level surface using 4WW. The patient is making good progress toward established goals as evidenced by QI scores. Treatment/Activity Tolerance:   [] Tolerated treatment with no adverse effects    [x] Patient limited by fatigue  [] Patient limited by pain   [] Patient limited by medical complications:    [] Adverse reaction to Tx:   [] Significant change in status    Safety:       []  bed alarm set    [x]  chair alarm set    []  Pt refused alarms                []  Telesitter activated      [x]  Gait belt used during tx session      []other:       Number of Minutes/Billable Intervention  Gait Training 30   Therapeutic Exercise    Neuro Re-Ed    Therapeutic Activity 30   Wheelchair Propulsion    Group    Other:    TOTAL 60       Social History  Social/Functional History  Lives With:  Rachel Ozuna, home most of day)  Type of Home: House  Home Layout: One level  Home Access: Stairs to enter without rails  Entrance Stairs - Number of Steps: 2 GERMAIN  Bathroom Shower/Tub: Tub/Shower unit  Bathroom Toilet: Standard (also has vanity on R to assist with transfer)  Bathroom Equipment: Grab bars in shower, Shower chair, Hand-held shower, Grab bars around toilet  Bathroom Accessibility: Walker accessible  Home Equipment: Cane, Rolling walker, 4 wheeled walker, [employer] (also has BSC but doesn't use)  Receives Help From: Home health (Tyrell Hodge RN 1x/week but just finished [facility] OT)  ADL Assistance: Independent  Homemaking Assistance: Independent  Homemaking Responsibilities: Yes (Shares with son)  Laundry Responsibility: Primary  Health Care Management: Primary (has pillbox with alarm)  Ambulation Assistance: Independent (mod I with 4ww, walks short community distances, but uses electric carts in stores)  Transfer Assistance: Independent  Active : No  Patient's  Info: Sons drive.   \"They think I'm never gonna drive again, I've got news for them\"  Additional Comments: Pt typically sleeps in a flat, regular bed at home. Pt reports multiple falls (>5) in the past week before hospitalization, but no injuries. Pt's wife recently passed away in September. Objective                                                                                    Goals:  (Update in navigator)   : Long term goals  Time Frame for Long term goals : 7days  Long term goal 1: Pt will perform all bed mobility with independence  Long term goal 2: Pt will perform sit to stand, pivot and car transfers  with mod i  Long term goal 3: Pt will ambulate 150  feet on level surfaces and 10' on unlevel surfaces with mod I  using 4ww  Long term goal 4: Pt will ascend/descend curb step with   4ww   and up to 4    steps with  rail(s) with    mod I  Long term goal 5: Pt will retrieve light item from floor with mod i  using 4ww and reacher:        Plan of Care                                                                              Times per week: 5 days per week for a minimum of 60 minutes/day plus group as appropriate for 60 minutes.   Treatment to include Current Treatment Recommendations: Strengthening, ROM, Balance Training, Functional Mobility Training, Transfer Training, ADL/Self-care Training, Gait Training, Patient/Caregiver Education & Training, IADL Training, Stair training, Equipment Evaluation, Education, & procurement, Neuromuscular Re-education, Home Exercise Program, Positioning, Endurance Training, Safety Education & Training, Cognitive/Perceptual Training    Electronically signed by   Raudel Friedman PT  11/20/2021, 12:57 PM

## 2021-11-21 LAB
GLUCOSE BLD-MCNC: 145 MG/DL (ref 70–99)
GLUCOSE BLD-MCNC: 167 MG/DL (ref 70–99)
GLUCOSE BLD-MCNC: 171 MG/DL (ref 70–99)
GLUCOSE BLD-MCNC: 195 MG/DL (ref 70–99)

## 2021-11-21 PROCEDURE — 94150 VITAL CAPACITY TEST: CPT

## 2021-11-21 PROCEDURE — 6360000002 HC RX W HCPCS: Performed by: NURSE PRACTITIONER

## 2021-11-21 PROCEDURE — 76937 US GUIDE VASCULAR ACCESS: CPT

## 2021-11-21 PROCEDURE — 6360000002 HC RX W HCPCS: Performed by: PHYSICAL MEDICINE & REHABILITATION

## 2021-11-21 PROCEDURE — 1280000000 HC REHAB R&B

## 2021-11-21 PROCEDURE — 2709999900 HC NON-CHARGEABLE SUPPLY

## 2021-11-21 PROCEDURE — 82962 GLUCOSE BLOOD TEST: CPT

## 2021-11-21 PROCEDURE — 6370000000 HC RX 637 (ALT 250 FOR IP): Performed by: NURSE PRACTITIONER

## 2021-11-21 PROCEDURE — 2580000003 HC RX 258: Performed by: NURSE PRACTITIONER

## 2021-11-21 PROCEDURE — 94761 N-INVAS EAR/PLS OXIMETRY MLT: CPT

## 2021-11-21 PROCEDURE — 05HB33Z INSERTION OF INFUSION DEVICE INTO RIGHT BASILIC VEIN, PERCUTANEOUS APPROACH: ICD-10-PCS | Performed by: PHYSICAL MEDICINE & REHABILITATION

## 2021-11-21 PROCEDURE — 6370000000 HC RX 637 (ALT 250 FOR IP): Performed by: PHYSICAL MEDICINE & REHABILITATION

## 2021-11-21 PROCEDURE — C1751 CATH, INF, PER/CENT/MIDLINE: HCPCS

## 2021-11-21 PROCEDURE — 36410 VNPNXR 3YR/> PHY/QHP DX/THER: CPT

## 2021-11-21 RX ADMIN — MEROPENEM 1000 MG: 1 INJECTION INTRAVENOUS at 21:16

## 2021-11-21 RX ADMIN — LABETALOL HYDROCHLORIDE 200 MG: 100 TABLET, FILM COATED ORAL at 10:19

## 2021-11-21 RX ADMIN — INSULIN LISPRO 1 UNITS: 100 INJECTION, SOLUTION INTRAVENOUS; SUBCUTANEOUS at 10:24

## 2021-11-21 RX ADMIN — LISINOPRIL 5 MG: 5 TABLET ORAL at 10:19

## 2021-11-21 RX ADMIN — INSULIN LISPRO 1 UNITS: 100 INJECTION, SOLUTION INTRAVENOUS; SUBCUTANEOUS at 13:09

## 2021-11-21 RX ADMIN — HYDRALAZINE HYDROCHLORIDE 25 MG: 25 TABLET ORAL at 13:21

## 2021-11-21 RX ADMIN — HYDRALAZINE HYDROCHLORIDE 25 MG: 25 TABLET ORAL at 21:09

## 2021-11-21 RX ADMIN — MEROPENEM 1000 MG: 1 INJECTION INTRAVENOUS at 10:18

## 2021-11-21 RX ADMIN — ENOXAPARIN SODIUM 40 MG: 100 INJECTION SUBCUTANEOUS at 10:20

## 2021-11-21 RX ADMIN — HYDRALAZINE HYDROCHLORIDE 25 MG: 25 TABLET ORAL at 05:33

## 2021-11-21 RX ADMIN — SODIUM CHLORIDE, PRESERVATIVE FREE 10 ML: 5 INJECTION INTRAVENOUS at 10:20

## 2021-11-21 RX ADMIN — OXYBUTYNIN CHLORIDE 5 MG: 5 TABLET, FILM COATED, EXTENDED RELEASE ORAL at 21:10

## 2021-11-21 RX ADMIN — AMLODIPINE BESYLATE 10 MG: 10 TABLET ORAL at 10:19

## 2021-11-21 RX ADMIN — INSULIN LISPRO 1 UNITS: 100 INJECTION, SOLUTION INTRAVENOUS; SUBCUTANEOUS at 18:09

## 2021-11-21 RX ADMIN — INSULIN GLARGINE 10 UNITS: 100 INJECTION, SOLUTION SUBCUTANEOUS at 21:16

## 2021-11-21 RX ADMIN — SODIUM CHLORIDE, PRESERVATIVE FREE 10 ML: 5 INJECTION INTRAVENOUS at 21:10

## 2021-11-21 RX ADMIN — LABETALOL HYDROCHLORIDE 200 MG: 100 TABLET, FILM COATED ORAL at 21:09

## 2021-11-21 RX ADMIN — ALOGLIPTIN 6.25 MG: 12.5 TABLET, FILM COATED ORAL at 10:19

## 2021-11-21 ASSESSMENT — PAIN SCALES - GENERAL: PAINLEVEL_OUTOF10: 0

## 2021-11-21 NOTE — CONSULTS
Unable to obtain new PIV. Per recent  inpatient records, patient was being following by Urology for antibiotic recs. Requesting Urology to be re-consulted in ARU. Recommend an MST midline be placed if therapy to last >2-3 days. Current PIV is patent and catheter visualized in vein, although nurse states it is positional.  Will await further direction on length of IV Merrem course.

## 2021-11-22 LAB
GLUCOSE BLD-MCNC: 127 MG/DL (ref 70–99)
GLUCOSE BLD-MCNC: 140 MG/DL (ref 70–99)
GLUCOSE BLD-MCNC: 200 MG/DL (ref 70–99)
GLUCOSE BLD-MCNC: 218 MG/DL (ref 70–99)

## 2021-11-22 PROCEDURE — 2580000003 HC RX 258: Performed by: NURSE PRACTITIONER

## 2021-11-22 PROCEDURE — 97530 THERAPEUTIC ACTIVITIES: CPT

## 2021-11-22 PROCEDURE — 94761 N-INVAS EAR/PLS OXIMETRY MLT: CPT

## 2021-11-22 PROCEDURE — 6370000000 HC RX 637 (ALT 250 FOR IP): Performed by: NURSE PRACTITIONER

## 2021-11-22 PROCEDURE — 6360000002 HC RX W HCPCS: Performed by: NURSE PRACTITIONER

## 2021-11-22 PROCEDURE — 6360000002 HC RX W HCPCS: Performed by: PHYSICAL MEDICINE & REHABILITATION

## 2021-11-22 PROCEDURE — 97535 SELF CARE MNGMENT TRAINING: CPT

## 2021-11-22 PROCEDURE — 82962 GLUCOSE BLOOD TEST: CPT

## 2021-11-22 PROCEDURE — 6370000000 HC RX 637 (ALT 250 FOR IP): Performed by: PHYSICAL MEDICINE & REHABILITATION

## 2021-11-22 PROCEDURE — 94150 VITAL CAPACITY TEST: CPT

## 2021-11-22 PROCEDURE — 97110 THERAPEUTIC EXERCISES: CPT

## 2021-11-22 PROCEDURE — 1280000000 HC REHAB R&B

## 2021-11-22 PROCEDURE — 99232 SBSQ HOSP IP/OBS MODERATE 35: CPT | Performed by: PHYSICAL MEDICINE & REHABILITATION

## 2021-11-22 PROCEDURE — 97116 GAIT TRAINING THERAPY: CPT

## 2021-11-22 RX ADMIN — LISINOPRIL 5 MG: 5 TABLET ORAL at 09:21

## 2021-11-22 RX ADMIN — ALOGLIPTIN 6.25 MG: 12.5 TABLET, FILM COATED ORAL at 09:20

## 2021-11-22 RX ADMIN — LABETALOL HYDROCHLORIDE 200 MG: 100 TABLET, FILM COATED ORAL at 21:34

## 2021-11-22 RX ADMIN — HYDRALAZINE HYDROCHLORIDE 25 MG: 25 TABLET ORAL at 13:52

## 2021-11-22 RX ADMIN — INSULIN LISPRO 2 UNITS: 100 INJECTION, SOLUTION INTRAVENOUS; SUBCUTANEOUS at 17:10

## 2021-11-22 RX ADMIN — SODIUM CHLORIDE, PRESERVATIVE FREE 10 ML: 5 INJECTION INTRAVENOUS at 22:15

## 2021-11-22 RX ADMIN — ACETAMINOPHEN 650 MG: 325 TABLET ORAL at 05:22

## 2021-11-22 RX ADMIN — SODIUM CHLORIDE, PRESERVATIVE FREE 10 ML: 5 INJECTION INTRAVENOUS at 09:25

## 2021-11-22 RX ADMIN — MEROPENEM 1000 MG: 1 INJECTION INTRAVENOUS at 22:09

## 2021-11-22 RX ADMIN — INSULIN LISPRO 2 UNITS: 100 INJECTION, SOLUTION INTRAVENOUS; SUBCUTANEOUS at 12:21

## 2021-11-22 RX ADMIN — HYDRALAZINE HYDROCHLORIDE 25 MG: 25 TABLET ORAL at 21:35

## 2021-11-22 RX ADMIN — OXYBUTYNIN CHLORIDE 5 MG: 5 TABLET, FILM COATED, EXTENDED RELEASE ORAL at 21:34

## 2021-11-22 RX ADMIN — MEROPENEM 1000 MG: 1 INJECTION INTRAVENOUS at 09:20

## 2021-11-22 RX ADMIN — HYDRALAZINE HYDROCHLORIDE 25 MG: 25 TABLET ORAL at 05:21

## 2021-11-22 RX ADMIN — ENOXAPARIN SODIUM 40 MG: 100 INJECTION SUBCUTANEOUS at 09:23

## 2021-11-22 RX ADMIN — INSULIN GLARGINE 10 UNITS: 100 INJECTION, SOLUTION SUBCUTANEOUS at 21:34

## 2021-11-22 RX ADMIN — AMLODIPINE BESYLATE 10 MG: 10 TABLET ORAL at 09:20

## 2021-11-22 ASSESSMENT — PAIN SCALES - GENERAL
PAINLEVEL_OUTOF10: 0
PAINLEVEL_OUTOF10: 0
PAINLEVEL_OUTOF10: 3
PAINLEVEL_OUTOF10: 0

## 2021-11-22 ASSESSMENT — PAIN DESCRIPTION - PAIN TYPE: TYPE: CHRONIC PAIN

## 2021-11-22 ASSESSMENT — PAIN DESCRIPTION - DESCRIPTORS: DESCRIPTORS: DISCOMFORT

## 2021-11-22 ASSESSMENT — PAIN DESCRIPTION - ONSET: ONSET: ON-GOING

## 2021-11-22 ASSESSMENT — PAIN DESCRIPTION - FREQUENCY: FREQUENCY: INTERMITTENT

## 2021-11-22 ASSESSMENT — PAIN - FUNCTIONAL ASSESSMENT: PAIN_FUNCTIONAL_ASSESSMENT: PREVENTS OR INTERFERES WITH MANY ACTIVE NOT PASSIVE ACTIVITIES

## 2021-11-22 ASSESSMENT — PAIN DESCRIPTION - LOCATION: LOCATION: GENERALIZED

## 2021-11-22 NOTE — PROGRESS NOTES
.Physical Rehabilitation: OCCUPATIONAL THERAPY     [x] daily progress note       [] discharge       Patient Name:  Karime Hurst   :  1940 MRN: [id number]  Room:  [address] [place], [place] [address] Date of Admission: 2021  Rehabilitation Diagnosis:   Urinary tract infection, site not specified [N39.0]  UTI (urinary tract infection) [N39.0]       Date 2021       Day of ARU Week:  5   Time IN/OUT 9598-3277   Individual Tx Minutes 60   Group Tx Minutes    Co-Treat Minutes    Concurrent Tx Minutes    TOTAL Tx Time Mins 60   Variance Time    Variance Time []   Refusal due to:     []   Medical hold/reason:    []   Illness   []   Off Unit for test/procedure  []   Extra time needed to complete task  []   Therapeutic need  []   Other (specify):   Restrictions Restrictions/Precautions  Restrictions/Precautions: Fall Risk, Contact Precautions (suprapubic catheter with ESBL)      Communication with other providers: [x]   OK to see per nursing:     []   Spoke with team member regarding:      Subjective observations and cognitive status: Pt agreeable to OT therapy. Pt in recliner upon arrival.     Pain level/location:  6  /10       Location: R shoulder   Discharge recommendations  Anticipated discharge date:  TBD  Destination: []?home alone   []?home alone w assist prn   [x]? home w/ family    []? Continuous supervision       []? SNF    []? Assisted living     []? Other:   Continued therapy: [x]? [facility] OT  []? OUTPATIENT  OT   []? No Further OT  Equipment needs: None   (HIT F2 to transition between stars)    Grooming:   SBA while standing at sink utilizing 4ww; however, req. 1 seated RB due to fatigue from oral hygiene  Oral Hygiene:  SBA while standing at sink utilizing 4ww, clean dentures and utilize mouthwash    Bathing:   Denied    Dressing:      Upper Body Dressing:  Already dressed. Practice donning/doffing zip up jacket. Req Min A LUE to thread in after RUE in jacket. Increase time to utilizing zipper decrease [address].  Pt reported stiffness  Lower Body Dressing: already dress  Footwear: already dress    Toileting:   Denied the need    Toilet Transfers:   SBA +1 verbal cues to utilizing locking brakes prior to seating and to be able to grasp grab on descending instead holding onto the 4ww. Completed 3 reps for safety  Device Used:    [x]   Standard Toilet         [x]   Grab Bars           []  Bedside Commode       []   Elevated Toilet          []   Other:      Tub/Shower Transfer:   NA  Device Used:    []   Shower Bench      []   Shower Chair      []   Tub Transfer Bench           []   Bathtub    []   Shower         []   Other:       Bed Mobility:           [x]   Pt received out of bed     Transfers:    Sit--> Stand:  SBA  Stand --> Sit:   SBA  Stand-Pivot:   SBA  Other:    Assistive device required for transfer:   4ww    Functional Mobility:  Room<>bathroom  Assistance:  SBA  Device:   []   Rolling Walker     []   Standard Walker []   Wheelchair        []   Berkley Jansky       [x]   4-Wheeled Walker         []   Cardiac Walker       []   Other:      Homemaking Tasks:   NA    Additional Therapeutic activities/exercises completed this date:     [x]   ADL Training   [x]   Balance/Postural training     [x]   Bed/Transfer Training   []   Endurance Training   []   Neuromuscular Re-ed   []   Nu-step:  Time:        Level:         #Steps:       []   Rebounder:    []  Seated     []  Standing        []   Supine Ther Ex (reps/sets):     [x]   Seated Ther Ex (reps/sets):  Engaged in LUE scap mobs and approximation/distraction follow by BUE strengthening ex targeting utilizing 3# dowel colton shoulder press, chest press, shoulder abd/add, shoulder horiz abd/add, concentric arm circles clockwise/counter clockwise, bicep/triceps curls x15 reps.  Demo SUP  +verbal/visual cues for pace and corrected techs.    []   Standing Ther Ex (reps/sets):     []   Other:    Comments:      Patient/Caregiver Education and Training:   [x]   Adaptive Equipment Use  [x]   Bed Mobility/Transfer Technique/Safety  []   Energy Conservation Tips  []   Family training  [x]   Postural Awareness  [x]   Safety During Functional Activities  []   Reinforced Patient's Precautions   []   Progress was updated and reviewed in Rehabtracker with patient and/or family this date. Treatment Plan for Next Session: Continue OT POC     Assessment:  Applied biofreeze to R shoulder girdle at end of tx session. This pt demonstrated a positive response to today's treatment as evidenced by actively participating. The patient is making progress toward established goals as evidenced by QI scores. Ongoing deficits are observed in the areas of decrease RUE AROM, balance, activity tolerance and continued focus on these is recommended.      Treatment/Activity Tolerance:   [x] Tolerated treatment with no adverse effects    [] Patient limited by fatigue  [x] Patient limited by pain   [] Patient limited by medical complications:    [] Adverse reaction to Tx:   [] Significant change in status    Safety:       []  bed alarm set    [x]  chair alarm set    []  Pt refused alarms                []  Telesitter activated      [x]  Gait belt used during tx session      []other:       Number of Minutes/Billable Intervention  Therapeutic Exercise 35   ADL Self-care 15   Neuro Re-Ed    Therapeutic Activity 10   Group    Other:    TOTAL 60       Social History  Social/Functional History  Lives With:  Cindy Sainz, home most of day)  Type of Home: House  Home Layout: One level  Home Access: Stairs to enter without rails  Entrance Stairs - Number of Steps: 2 GERMAIN  Bathroom Shower/Tub: Tub/Shower unit  Bathroom Toilet: Standard (also has vanity on R to assist with transfer)  Bathroom Equipment: Grab bars in shower, Shower chair, Hand-held shower, Grab bars around toilet  Bathroom Accessibility: Walker accessible  Home Equipment: Cane, Rolling walker, 4 wheeled walker, Wheelchair-manual (also has BSC but doesn't use)  Receives Help From: Home health (Tyrell Hodge RN 1x/week but just finished Tyrell Hodge OT)  ADL Assistance: Independent  Homemaking Assistance: Independent  Homemaking Responsibilities: Yes (Shares with son)  Laundry Responsibility: Primary  Health Care Management: Primary (has pillbox with alarm)  Ambulation Assistance: Independent (mod I with 4ww, walks short community distances, but uses electric carts in stores)  Transfer Assistance: Independent  Active : No  Patient's  Info: Sons drive. \"They think I'm never gonna drive again, I've got news for them\"  Additional Comments: Pt typically sleeps in a flat, regular bed at home. Pt reports multiple falls (>5) in the past week before hospitalization, but no injuries. Pt's wife recently passed away in September. Objective                                                                                    Goals:  (Update in navigator)  Short term goals  Time Frame for Short term goals: STGs=LTGs:  Long term goals  Time Frame for Long term goals : 7-10 days or until d/c  Long term goal 1: Pt will complete grooming tasks Ind  Long term goal 2: Pt will complete total body bathing mod I  Long term goal 3: Pt will complete UB dressing Ind  Long term goal 4: Pt will complete LB dressing Ind  Long term goal 5: Pt will doff/don footwear Ind  Long term goals 6: Pt will complete toileting mod I  Long term goal 7: Pt will complete functional transfers (bed, chair, toilet, shower) c DME PRN and mod I  Long term goal 8: Pt will perform therex/therax to facilitate increased strength/endurance/ax tolerance (c emphasis on dynamic standing balance/tolerance >8 mins, BUE endurance) c SBA  Long term goal 9: Pt will complete simple homemaking tasks c DME PRN and mod I:        Plan of Care                                                                              Times per week: 5 days per week for a minimum of 60 minutes/day plus group as appropriate for 60 minutes.   Treatment to include Plan  Times per day: Daily  Current Treatment Recommendations: Strengthening, Balance Training, Functional Mobility Training, Endurance Training, Safety Education & Training, Patient/Caregiver Education & Training, Equipment Evaluation, Education, & procurement, Self-Care / ADL, Home Management Training    Electronically signed by   [employer] ElectricKHARI,  11/22/2021, 8:22 AM

## 2021-11-22 NOTE — CONSULTS
Consult completed. Procedure/rationale explained to pt including rationale. #4.5Fr ArrowG+beryl Blue Advance MidLine initiated to RUE anterior distal Basilic Vein using sterile, UltraSound-guided technique. Sterile dressing with SkinPrep, StatLock Securing Device, BioPatch, SecurePortIV, & SwabCap applied. Bee Stallworth, primary RN notified.

## 2021-11-22 NOTE — PROGRESS NOTES
Physical Therapy    [x] daily progress note       [] discharge       Patient Name:  Ankit Roy   :  1940 MRN: [id number]  Room:  [address] [place] [address] Date of Admission: 2021  Rehabilitation Diagnosis:   Urinary tract infection, site not specified [N39.0]  UTI (urinary tract infection) [N39.0]       Date 2021       Day of ARU Week:  5   Time IN//930  1310/1418   Individual Tx Minutes 60+68   Group Tx Minutes    Co-Treat Minutes    Concurrent Tx Minutes    TOTAL Tx Time Mins 128   Variance Time +8   Variance Time []   Refusal due to:     []   Medical hold/reason:    []   Illness   []   Off Unit for test/procedure  [x]   Extra time needed to complete tasks  []   Therapeutic need  []   Other (specify):   Restrictions Restrictions/Precautions  Restrictions/Precautions: Fall Risk, Contact Precautions (suprapubic catheter with ESBL)      Interdisciplinary communication [x]   Cleared for therapy per nursing     [x]   RN notified about  issues related to switching mccrary catheter bag to leg bag as pt used this during the day at baseline and increased weakness today  [x]   RN updated on pt performance  []   Spoke with   []   Spoke with OT  []   Spoke with MD  []   Other:    Subjective observations and cognitive status: (AM) Pt in bathroom under PCT's supervision, alert, unsuccessful with BM, states not sleeping well due to procedure that he had to undergo late last night, states feeling weaker today, also states \"I don't feel like I'm here\", requested to be assisted back to bed, kept eyes closed during bed level thera ex but performed required movements appropriately. (PM) Pt resting in bed, alert, states eating some, and being able to take some nap, willing to participate in PT, pt had better perspective on having to stay at [place] [address] for his welfare rather than rushing to go home for Thanksgiving.         Pain level/location:        Location: LLE (sore)    Discharge recommendations  Anticipated discharge date:  TBD  Destination: []home alone   []home alone with assist PRN     [x] home w/ family      [] Continuous supervision  []SNF    [] Assisted living     [] Other:  Continued therapy: [x]HHC PT  []OUTPATIENT PT   [] No Further PT  []SNF PT  Caregiver training recommended: []Yes  [] No   Equipment needs: has rollator      PT IRF-LESTER scores and goals for discharge assessment:   Roll Left and Right  Assistance Needed: Supervision or touching assistance  Comment: SBA-Sup without use of bed features; leg bag in place  CARE Score: 4  Discharge Goal: Independent    Sit to Lying  Assistance Needed: Supervision or touching assistance  Comment: SBA-Sup without use of bed features (leg bag in place)  CARE Score: 4  Discharge Goal: Independent    Lying to Sitting on Side of Bed  Assistance Needed: Supervision or touching assistance  Comment: SBA-Sup without use of bed features; leg bag in place  CARE Score: 4  Discharge Goal: Independent    Sit to Stand  Assistance Needed: Supervision or touching assistance  Comment: SBA with leg bag in place and using rollator  CARE Score: 4  Discharge Goal: Independent    Chair/Bed-to-Chair Transfer  Assistance Needed: Supervision or touching assistance  Comment: SBA using rollator, leg bag in place  CARE Score: 4  Discharge Goal: Independent      Car Transfer  Assistance Needed: Supervision or touching assistance  Comment: SBA using rollator, leg bag in place  CARE Score: 4  Discharge Goal: Independent    Walk 10 Feet?   Walk 10 Feet?: Yes    1 Step  1 Step?: Yes      Wheelchair Ability  Uses a Wheelchair and/or Scooter?: No    Walking Ability  Does the Patient Walk?: Yes    Walk 10 Feet  Assistance Needed: Supervision or touching assistance  Comment: SBA using rollator  CARE Score: 4  Discharge Goal: Independent    Walk 50 Feet with Two Turns  Assistance Needed: Supervision or touching assistance  Comment: SBA using rollator  CARE Score: 4  Discharge Goal: Independent    Walk 150 Feet  Comment: pt still inconsistent with this distance as he was able to ambulate >150 ft on 11/20/2021 and max tolerance today was 108 ft using rollator with SBA (limited by fatigue)  Reason if not Attempted: Not attempted due to medical condition or safety concerns  CARE Score: 88  Discharge Goal: Independent    Additional Therapeutic activities/exercises completed this date:     []   Nu-step:  Time:        Level:         #Steps:       []   Rebounder:    []  Seated     []  Standing        [x]   Balance training: pulling of pants up in unsupported stance with CGA         []   Postural training    [x]   Supine ther ex (reps/sets): AROM ankle PF/DF bilat x 10 reps, AROM R/L knee flexion/extension x 10 reps, AROM R/L hip abd/add x 10 reps     []   Seated ther ex (reps/sets):     []   Standing ther ex (reps/sets):     []   Other: Toileting activity completed with    [x]   Other: Vital signs assessment at bed level (refer to flowsheet for details); pt took part in problem solving during leg bag replacement at seated level based on his usual routine prior to this hospitalization  (PM) Biofreeze applied on L lower leg down to foot to decrease muscle soreness     Comments:      Patient/Caregiver Education and Training:   []   Role of PT  []   Education about Dx  []   Use of call light for assist   []   HEP provided and explained   [x]   Treatment plan reviewed  []   Home safety  []   Wheelchair mobility/management   []   Body mechanics  []   Bed Mobility/Transfer technique  []   Gait technique/sequencing  []   Proper use of assistive device/adaptive equipment  []   Stair training/Advanced mobility safety and technique  []   Reinforced patient's precautions/mobility while maintaining precautions  []   Postural awareness  []   Family/caregiver training  []   Progress was updated and reviewed in Rehabtracker with patient and/or family this date.   [x]   Other: Pt education about care conference process and team goals related to discharge planning, DME recommendations for mobility, and follow-up PT after ARU stay. Treatment Plan for Next Session: functional mobility training using rollator, stairs, object retrieval activity       Assessment:  (AM) This pt demonstrated a negative response to today's treatment as evidenced by inability to fully participate in gait training and standing activities due to increased weakness. Vital signs were stable when assessed at bed level and blood sugar was 127 this AM per white board communication and pt's recollection, however pt's lack of sleep may have contributed to pt's Sx this AM. (PM) Pt made progress in his functional mobility this tx session as he had more strength to safely participate in gait and transfers training. Ability to ambulate >/=150 consecutively remains impaired due to his decreased endurance.      Treatment/Activity Tolerance:   [] Tolerated treatment with no adverse effects    [x] Patient limited by increased weakness  [] Patient limited by pain   [] Patient limited by medical complications:    [] Adverse reaction to Tx:   [] Significant change in status    Safety:       [x]  (AM) bed alarm set    [x]  (PM) chair alarm set    []  Pt refused alarms                []  Telesitter activated      [x]  Gait belt used during tx session      []other:       Number of Minutes/Billable Intervention  Gait Training 30   Therapeutic Exercise 15   Neuro Re-Ed    Therapeutic Activity 83   Wheelchair Propulsion    Group    Other:    TOTAL 128     Social History  Social/Functional History  Lives With:  mAaris Covington, home most of day)  Type of Home: House  Home Layout: One level  Home Access: Stairs to enter without rails  Entrance Stairs - Number of Steps: 2 GERMAIN  Bathroom Shower/Tub: Tub/Shower unit  Bathroom Toilet: Standard (also has vanity on R to assist with transfer)  Bathroom Equipment: Grab bars in shower, Shower chair, Hand-held shower, Grab bars around toilet  Bathroom Accessibility: Panda Bravo accessible  Home Equipment: Cane, Rolling walker, 4 wheeled walker, Yefri Prow (also has BSC but doesn't use)  Receives Help From: Home health (Tyrell Hodge RN 1x/week but just finished Tyrell Hodge OT)  ADL Assistance: [address]: Independent  Homemaking Responsibilities: Yes (Shares with son)  Laundry Responsibility: Primary  Health Care Management: Primary (has pillbox with alarm)  Ambulation Assistance: Independent (mod I with 4ww, walks short community distances, but uses electric carts in stores)  Transfer Assistance: Independent  Active : No  Patient's  Info: Sons drive. \"They think I'm never gonna drive again, I've got news for them\"  Additional Comments: Pt typically sleeps in a flat, regular bed at home. Pt reports multiple falls (>5) in the past week before hospitalization, but no injuries. Pt's wife recently passed away in September. Objective                                                                                    Goals:  (Update in navigator)   : Long term goals  Time Frame for Long term goals : 7days  Long term goal 1: Pt will perform all bed mobility with independence  Long term goal 2: Pt will perform sit to stand, pivot and car transfers  with mod i  Long term goal 3: Pt will ambulate 150  feet on level surfaces and 10' on unlevel surfaces with mod I  using 4ww  Long term goal 4: Pt will ascend/descend curb step with   4ww   and up to 4    steps with  rail(s) with    mod I  Long term goal 5: Pt will retrieve light item from floor with mod i  using 4ww and reacher:        Plan of Care                                                                              Times per week: 5 days per week for a minimum of 60 minutes/day plus group as appropriate for 60 minutes.   Treatment to include Current Treatment Recommendations: Strengthening, ROM, Balance Training, Functional Mobility Training, Transfer Training, ADL/Self-care Training, Gait Training, Patient/Caregiver Education & Training, IADL Training, Stair training, Equipment Evaluation, Education, & procurement, Neuromuscular Re-education, Home Exercise Program, Positioning, Endurance Training, Safety Education & Training, Cognitive/Perceptual Training    Electronically signed by   Kellen Mcnamara, PT  11/22/2021, 2:26 PM

## 2021-11-22 NOTE — PROGRESS NOTES
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    Prior (baseline) level of function: Independent.     Current level of function:         Current  IRF-LESTER and Goals:   Occupational Therapy:    Short term goals  Time Frame for Short term goals: STGs=LTGs :   Long term goals  Time Frame for Long term goals : 7-10 days or until d/c  Long term goal 1: Pt will complete grooming tasks Ind  Long term goal 2: Pt will complete total body bathing mod I  Long term goal 3: Pt will complete UB dressing Ind  Long term goal 4: Pt will complete LB dressing Ind  Long term goal 5: Pt will doff/don footwear Ind  Long term goals 6: Pt will complete toileting mod I  Long term goal 7: Pt will complete functional transfers (bed, chair, toilet, shower) c DME PRN and mod I  Long term goal 8: Pt will perform therex/therax to facilitate increased strength/endurance/ax tolerance (c emphasis on dynamic standing balance/tolerance >8 mins, BUE endurance) c SBA  Long term goal 9: Pt will complete simple homemaking tasks c DME PRN and mod I :                                       Eating: Eating  Assistance Needed: Independent  Comment: able to open packages/containers  CARE Score: 6  Discharge Goal: Independent       Oral Hygiene: Oral Hygiene  Assistance Needed: Setup or clean-up assistance  Comment: seated  CARE Score: 5  Discharge Goal: Independent    UB/LB Bathing: Shower/Bathe Self  Assistance Needed: Supervision or touching assistance  Comment: only able to perform partial sponge bath sinkside 2* active IV. CGA in stance for perineal bathing  CARE Score: 4  Discharge Goal: Independent    UB Dressing: Upper Body Dressing  Assistance Needed: Supervision or touching assistance  Comment: to don pullover T shirt  CARE Score: 4  Discharge Goal: Independent         LB Dressing: Lower Body Dressing  Assistance Needed: Partial/moderate assistance  Comment: required assist to manage LLE and catheter through brief and pants; able to thread RLE and perform pants management up  CARE Score: 3  Discharge Goal: Independent    Donning and [place] Footwear: Putting On/Taking Off Footwear  Assistance Needed: Supervision or touching assistance  Comment: to doff hospital socks, don personal shoes  CARE Score: 4  Discharge Goal: Independent      Toileting: Toileting Hygiene  Assistance Needed: Supervision or touching assistance  Comment: able to perform bowel hygiene, pants management c CGA  CARE Score: 4  Discharge Goal: Independent      Toilet Transfers:   Toilet Transfer  Assistance Needed: Supervision or touching assistance  Comment: CGA c RW, grab bar  CARE Score: 4  Discharge Goal: Independent    Physical Therapy:         Long term goals  Time Frame for Long term goals : 7days  Long term goal 1: Pt will perform all bed mobility with independence  Long term goal 2: Pt will perform sit to stand, pivot and car transfers  with mod i  Long term goal 3: Pt will ambulate 150  feet on level surfaces and 10' on unlevel surfaces with mod I  using 4ww  Long term goal 4: Pt will ascend/descend curb step with   4ww   and up to 4    steps with  rail(s) with    mod I  Long term goal 5: Pt will retrieve light item from floor with mod i  using 4ww and reacher      Bed Mobility:   Sit to Lying  Assistance Needed: Supervision or touching assistance  Comment: SBA-Sup without use of bed features (leg bag in place)  CARE Score: 4  Discharge Goal: Independent  Roll Left and Right  Assistance Needed: Supervision or touching assistance  Comment: SBA-Sup without use of bed features; leg bag in place  CARE Score: 4  Discharge Goal: Independent  Lying to Sitting on Side of Bed  Assistance Needed: Supervision or touching assistance  Comment: SBA-Sup without use of bed features; leg bag in place  CARE Score: 4  Discharge Goal: Independent    Transfers:    Sit to Stand  Assistance Needed: Supervision or touching assistance  Comment: SBA with leg bag in place and using rollator  CARE Score: 4  Discharge Goal: Independent  Chair/Bed-to-Chair Transfer  Assistance Needed: Supervision or touching assistance  Comment: SBA using rollator, leg bag in place  CARE Score: 4  Discharge Goal: Independent     Car Transfer  Assistance Needed: Supervision or touching assistance  Comment: SBA using rollator, leg bag in place  CARE Score: 4  Discharge Goal: Independent    Ambulation:    Walking Ability  Does the Patient Walk?: Yes     Walk 10 Feet  Assistance Needed: Supervision or touching assistance  Comment: SBA using rollator  CARE Score: 4  Discharge Goal: Independent     Walk 50 Feet with Two Turns  Assistance Needed: Supervision or touching assistance  Comment: CG with 2ww, slow héctor with decreased TKE, leaning on walker with UEs, low foot clearance  CARE Score: 4  Discharge Goal: Independent     Walk 150 Feet  Comment: 72' max distance due to fatigue  Reason if not Attempted: Not attempted due to medical condition or safety concerns  CARE Score: 88  Discharge Goal: Independent     Walking 10 Feet on Uneven Surfaces  Assistance Needed: Supervision or touching assistance  Comment: CG with 2ww  CARE Score: 4  Discharge Goal: Independent     1 Step (Curb)  Assistance Needed: Supervision or touching assistance  Comment: supervision with 2ww  CARE Score: 4  Discharge Goal: Independent     4 Steps  Assistance Needed: Supervision or touching assistance  Comment: supervision with B rails  CARE Score: 4  Discharge Goal: Independent     12 Steps  Comment: Pt states he could not complete full flight without resting PTA  Reason if not Attempted: Not applicable  CARE Score: 9  Discharge Goal: Not Applicable       Wheelchair:  w/c Ability: Wheelchair Ability  Uses a Wheelchair and/or Scooter?: No                Balance:        Object: Picking Up Object  Assistance Needed: Supervision or touching assistance  Comment: supervision with 2ww  CARE Score: 4  Discharge Goal: Independent    I      Exam:    Blood pressure 138/65, pulse 60, temperature 98.6 °F (37 °C), temperature source Oral, resp. rate 18, height 5' 8\" (1.727 m), weight 199 lb 1.2 oz (90.3 kg), SpO2 98 %. General: Sitting up in bed. Talkative. Visiting with his cast.  In no distress. HEENT: Slow and deliberate speech. Visual fields full. MMM. Pulmonary: Unlabored and clear. Cardiac: RRR. Abdomen: Patient's abdomen is soft and nondistended. Bowel sounds were present throughout. There was no rebound, guarding or masses noted. Suprapubic catheter draining clear urine. Upper extremities: Clumsy movements bilaterally but functional  strength noted. Trace edema in the hands. Lower extremities: Trace edema in the calves. No signs of DVT. Heels clear. Sitting balance was good. Standing balance was poor.     Lab Results   Component Value Date    WBC 6.5 11/18/2021    HGB 9.6 (L) 11/18/2021    HCT 31.4 (L) 11/18/2021    MCV 94.0 11/18/2021     11/18/2021     Lab Results   Component Value Date    INR 0.99 05/19/2015    INR 1.31 03/23/2013    INR 1.37 01/24/2011    PROTIME 11.3 05/19/2015    PROTIME 14.2 03/23/2013    PROTIME 15.2 (H) 01/24/2011     Lab Results   Component Value Date    CREATININE 1.7 (H) 11/19/2021    BUN 27 (H) 11/19/2021     11/19/2021    K 4.6 11/19/2021     11/19/2021    CO2 22 11/19/2021     Lab Results   Component Value Date    ALT 16 11/17/2021    AST 18 11/17/2021    ALKPHOS 98 11/17/2021    BILITOT 0.3 11/17/2021       Expected length of stay  prior to a supervised level of function for discharge home with a walker and [facility] OT/PT is 2 weeks. Recommendations:    1. Urinary tract infection with gait disturbance and frequent falls: He responds to cues to engage in the daily occupational and physical therapy. IV antibiotics for his urinary tract infection. (New midline in the right upper limb). Ongoing generalized strengthening and range of motion recovery exercises. Adaptive equipment training. Pulmonary hygiene measures. Nutritional support. Bowel retraining. Maximizing blood pressure and blood sugar control. DVT prophylaxis. Verbal cues and moderate physical assist for transfers. 2. DVT prophylaxis: Lovenox 40 mg subcu daily. I must monitor his hemoglobin and platelet count periodically while on this medication. Weightbearing activities are pursued daily. GI prophylaxis offered. No signs of acute blood loss. 3. 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 He is on Lantus nightly and Humalog sliding scale. He is receiving oral Nesina and has his blood sugars checked at mealtime and bedtime. 4. Chronic kidney disease with recent exacerbation: Avoiding nephrotoxic medications. Avoiding great fluctuations of blood pressure. Hydralazine. Periodic monitoring of his chemistries. Encouraging oral hydration. Blood sugars generally less than 200.  5. Hypertension: Norvasc, Apresoline and Normodyne with lisinopril. Target systolic blood pressure is 120-140. Vital signs are checked at rest and with activity. Blood pressure within target range.   6. Prostate cancer with suprapubic catheter: Outpatient follow-up with urology to develop a program for either every 3-week catheter replacements or weekly bladder flushing.

## 2021-11-23 LAB
GLUCOSE BLD-MCNC: 119 MG/DL (ref 70–99)
GLUCOSE BLD-MCNC: 198 MG/DL (ref 70–99)
GLUCOSE BLD-MCNC: 221 MG/DL (ref 70–99)
GLUCOSE BLD-MCNC: 242 MG/DL (ref 70–99)

## 2021-11-23 PROCEDURE — 94761 N-INVAS EAR/PLS OXIMETRY MLT: CPT

## 2021-11-23 PROCEDURE — 6370000000 HC RX 637 (ALT 250 FOR IP): Performed by: NURSE PRACTITIONER

## 2021-11-23 PROCEDURE — 2580000003 HC RX 258: Performed by: NURSE PRACTITIONER

## 2021-11-23 PROCEDURE — 6360000002 HC RX W HCPCS: Performed by: PHYSICAL MEDICINE & REHABILITATION

## 2021-11-23 PROCEDURE — 82962 GLUCOSE BLOOD TEST: CPT

## 2021-11-23 PROCEDURE — 6360000002 HC RX W HCPCS: Performed by: NURSE PRACTITIONER

## 2021-11-23 PROCEDURE — 97116 GAIT TRAINING THERAPY: CPT

## 2021-11-23 PROCEDURE — 1280000000 HC REHAB R&B

## 2021-11-23 PROCEDURE — 99233 SBSQ HOSP IP/OBS HIGH 50: CPT | Performed by: PHYSICAL MEDICINE & REHABILITATION

## 2021-11-23 PROCEDURE — 97530 THERAPEUTIC ACTIVITIES: CPT

## 2021-11-23 PROCEDURE — 6370000000 HC RX 637 (ALT 250 FOR IP): Performed by: PHYSICAL MEDICINE & REHABILITATION

## 2021-11-23 PROCEDURE — 97535 SELF CARE MNGMENT TRAINING: CPT

## 2021-11-23 RX ADMIN — SODIUM CHLORIDE, PRESERVATIVE FREE 10 ML: 5 INJECTION INTRAVENOUS at 08:33

## 2021-11-23 RX ADMIN — INSULIN LISPRO 1 UNITS: 100 INJECTION, SOLUTION INTRAVENOUS; SUBCUTANEOUS at 17:12

## 2021-11-23 RX ADMIN — AMLODIPINE BESYLATE 10 MG: 10 TABLET ORAL at 08:25

## 2021-11-23 RX ADMIN — ENOXAPARIN SODIUM 40 MG: 100 INJECTION SUBCUTANEOUS at 08:24

## 2021-11-23 RX ADMIN — INSULIN GLARGINE 10 UNITS: 100 INJECTION, SOLUTION SUBCUTANEOUS at 21:26

## 2021-11-23 RX ADMIN — LABETALOL HYDROCHLORIDE 200 MG: 100 TABLET, FILM COATED ORAL at 21:26

## 2021-11-23 RX ADMIN — OXYBUTYNIN CHLORIDE 5 MG: 5 TABLET, FILM COATED, EXTENDED RELEASE ORAL at 21:26

## 2021-11-23 RX ADMIN — MEROPENEM 1000 MG: 1 INJECTION INTRAVENOUS at 21:24

## 2021-11-23 RX ADMIN — ACETAMINOPHEN 650 MG: 325 TABLET ORAL at 02:16

## 2021-11-23 RX ADMIN — SODIUM CHLORIDE, PRESERVATIVE FREE 10 ML: 5 INJECTION INTRAVENOUS at 22:29

## 2021-11-23 RX ADMIN — INSULIN LISPRO 2 UNITS: 100 INJECTION, SOLUTION INTRAVENOUS; SUBCUTANEOUS at 12:08

## 2021-11-23 RX ADMIN — HYDRALAZINE HYDROCHLORIDE 25 MG: 25 TABLET ORAL at 13:48

## 2021-11-23 RX ADMIN — HYDRALAZINE HYDROCHLORIDE 25 MG: 25 TABLET ORAL at 06:25

## 2021-11-23 RX ADMIN — ACETAMINOPHEN 650 MG: 325 TABLET ORAL at 06:25

## 2021-11-23 RX ADMIN — ALOGLIPTIN 6.25 MG: 12.5 TABLET, FILM COATED ORAL at 08:24

## 2021-11-23 RX ADMIN — MEROPENEM 1000 MG: 1 INJECTION INTRAVENOUS at 08:33

## 2021-11-23 RX ADMIN — LISINOPRIL 5 MG: 5 TABLET ORAL at 08:24

## 2021-11-23 RX ADMIN — HYDRALAZINE HYDROCHLORIDE 25 MG: 25 TABLET ORAL at 21:26

## 2021-11-23 ASSESSMENT — PAIN DESCRIPTION - PAIN TYPE
TYPE: CHRONIC PAIN
TYPE: CHRONIC PAIN

## 2021-11-23 ASSESSMENT — PAIN - FUNCTIONAL ASSESSMENT
PAIN_FUNCTIONAL_ASSESSMENT: PREVENTS OR INTERFERES SOME ACTIVE ACTIVITIES AND ADLS
PAIN_FUNCTIONAL_ASSESSMENT: PREVENTS OR INTERFERES SOME ACTIVE ACTIVITIES AND ADLS

## 2021-11-23 ASSESSMENT — PAIN SCALES - GENERAL
PAINLEVEL_OUTOF10: 6
PAINLEVEL_OUTOF10: 0
PAINLEVEL_OUTOF10: 6
PAINLEVEL_OUTOF10: 6
PAINLEVEL_OUTOF10: 0
PAINLEVEL_OUTOF10: 0

## 2021-11-23 ASSESSMENT — PAIN DESCRIPTION - ONSET
ONSET: ON-GOING
ONSET: ON-GOING

## 2021-11-23 ASSESSMENT — PAIN DESCRIPTION - PROGRESSION
CLINICAL_PROGRESSION: NOT CHANGED
CLINICAL_PROGRESSION: NOT CHANGED

## 2021-11-23 ASSESSMENT — PAIN DESCRIPTION - LOCATION
LOCATION: GENERALIZED
LOCATION: GENERALIZED

## 2021-11-23 ASSESSMENT — PAIN DESCRIPTION - FREQUENCY
FREQUENCY: INTERMITTENT
FREQUENCY: INTERMITTENT

## 2021-11-23 ASSESSMENT — PAIN DESCRIPTION - DESCRIPTORS
DESCRIPTORS: DISCOMFORT
DESCRIPTORS: DISCOMFORT

## 2021-11-23 NOTE — PATIENT CARE CONFERENCE
ACUTE REHAB TEAM CONFERENCE SUMMARY   [facility]    NAME: Dom Wilburn  : 1940 ADMIT DATE: 2021    Rehab Admitting Dx:Urinary tract infection, site not specified [N39.0]  UTI (urinary tract infection) [N39.0]  Patient Comorbid Conditions: Active Hospital Problems    Diagnosis Date Noted    Generalized weakness [R53.1] 03/15/2012     Priority: High     Class: Present on Admission    Chronic kidney disease, stage 3a ([address] [N18.31]     Uncontrolled type 2 diabetes mellitus with peripheral neuropathy (HCC) [E11.42, E11.65]     Prostate cancer ([address] [C61]     Suprapubic catheter ([facility] [Z93.59]     Essential hypertension [I10] 2021    Frequent falls [R29.6] 2021    UTI (urinary tract infection) [N39.0] 2021     Date: 2021    CASE MANAGEMENT  Current issues/needs regarding patient and family discharge status:   Patient plans dc 1-level home with son. 2 [place]. Son isn't always home. Some DME that belonged to his spouse. PHYSICAL THERAPY (Updated in QI)        Long term goals  Time Frame for Long term goals : 7days  Long term goal 1: Pt will perform all bed mobility with independence  Long term goal 2: Pt will perform sit to stand, pivot and car transfers  with mod i  Long term goal 3: Pt will ambulate 150  feet on level surfaces and 10' on unlevel surfaces with mod I  using 4ww  Long term goal 4: Pt will ascend/descend curb step with   4ww   and up to 4    steps with  rail(s) with    mod I  Long term goal 5: Pt will retrieve light item from floor with mod i  using 4ww and reacher    Impairments/deficits, barriers:     Body structures, Functions, Activity limitations: Decreased functional mobility , Decreased balance, Decreased safe awareness, Decreased cognition, Decreased endurance, Decreased high-level IADLs     Prognosis: Good  Decision Making: Medium Complexity  Clinical Presentation: evolving characteristics  Equipment needed at discharge: has rollator       PT IRF-LESTER scores since initial assessment  Bed Mobility:   Sit to Lying  Assistance Needed: Supervision or touching assistance  Comment: SBA-Sup without use of bed features (leg bag in place)  CARE Score: 4  Discharge Goal: Independent    Roll Left and Right  Assistance Needed: Supervision or touching assistance  Comment: SBA-Sup without use of bed features; leg bag in place  CARE Score: 4  Discharge Goal: Independent    Lying to Sitting on Side of Bed  Assistance Needed: Supervision or touching assistance  Comment: SBA-Sup without use of bed features; leg bag in place  CARE Score: 4  Discharge Goal: Independent    Transfers:    Sit to Stand  Assistance Needed: Supervision or touching assistance  Comment: SBA-Sup using rollator and with leg bag in place  CARE Score: 4  Discharge Goal: Independent    Chair/Bed-to-Chair Transfer  Assistance Needed: Supervision or touching assistance  Comment: SBA-Sup using rollator and with leg bag in place  CARE Score: 4  Discharge Goal: Independent         Car Transfer  Assistance Needed: Supervision or touching assistance  Comment: SBA using rollator, leg bag in place  CARE Score: 4  Discharge Goal: Independent    Ambulation:    Walking Ability  Does the Patient Walk?: Yes     Walk 10 Feet  Assistance Needed: Supervision or touching assistance  Comment: SBA using rollator  CARE Score: 4  Discharge Goal: Independent     Walk 50 Feet with Two Turns  Assistance Needed: Supervision or touching assistance  Comment: SBA using rollator  CARE Score: 4  Discharge Goal: Independent     Walk 150 Feet  Comment: pt still inconsistent with this distance as he was able to ambulate >150 ft on 11/20/2021 and max tolerance today was 108 ft using rollator with SBA (limited by fatigue)  Reason if not Attempted: Not attempted due to medical condition or safety concerns  CARE Score: 88  Discharge Goal: Independent     Walking 10 Feet on Uneven Surfaces  Assistance Needed: Supervision or touching assistance  Comment: SBA-CGA using rollator and with leg bag in place  CARE Score: 4  Discharge Goal: Independent     1 Step (Curb)  Assistance Needed: Supervision or touching assistance  Comment: CGA using rollator and with leg bag in place  CARE Score: 4  Discharge Goal: Independent     4 Steps  Assistance Needed: Supervision or touching assistance  Comment: SBA using railings and with leg bag in place  CARE Score: 4  Discharge Goal: Independent     12 Steps  Comment: Pt states he could not complete full flight without resting PTA  Reason if not Attempted: Not applicable  CARE Score: 9  Discharge Goal: Not Applicable           Wheelchair:  w/c Ability: Wheelchair Ability  Uses a Wheelchair and/or Scooter?: No                        Balance:        Object: Picking Up Object  Assistance Needed: Supervision or touching assistance  Comment: supervision with 2ww  CARE Score: 4  Discharge Goal: Independent    Fall Risk: []  Yes  []  No    OCCUPATIONAL THERAPY  (Updated in QI)  Short term goals  Time Frame for Short term goals: STGs=LTGs :   Long term goals  Time Frame for Long term goals : 7-10 days or until d/c  Long term goal 1: Pt will complete grooming tasks Ind  Long term goal 2: Pt will complete total body bathing mod I  Long term goal 3: Pt will complete UB dressing Ind  Long term goal 4: Pt will complete LB dressing Ind  Long term goal 5: Pt will doff/don footwear Ind  Long term goals 6: Pt will complete toileting mod I  Long term goal 7: Pt will complete functional transfers (bed, chair, toilet, shower) c DME PRN and mod I  Long term goal 8: Pt will perform therex/therax to facilitate increased strength/endurance/ax tolerance (c emphasis on dynamic standing balance/tolerance >8 mins, BUE endurance) c SBA  Long term goal 9: Pt will complete simple homemaking tasks c DME PRN and mod I :                                       OT IRF-LESTER scores and goals since initial assessment: ADLs:    Eating: Eating  Assistance Needed: Independent  Comment: X  CARE Score: 6  Discharge Goal: Independent       Oral Hygiene: Oral Hygiene  Assistance Needed: Independent  Comment: X  CARE Score: 6  Discharge Goal: Independent    UB/LB Bathing: Shower/Bathe Self  Assistance Needed: Supervision or touching assistance  Comment: SBA uses reacher with washcloth to wash distal B LE, introduce LHS next ADL  CARE Score: 4  Discharge Goal: Independent    UB Dressing: Upper Body Dressing  Assistance Needed: Independent  Comment: X  CARE Score: 6  Discharge Goal: Independent         LB Dressing: Lower Body Dressing  Assistance Needed: Supervision or touching assistance  Comment: SBA uses reacher to thread B LE  CARE Score: 4  Discharge Goal: Independent    Donning and [place] Footwear: Putting On/Taking Off Footwear  Assistance Needed: Independent  Comment: using sock aide  CARE Score: 6  Discharge Goal: Independent      Toileting: Toileting Hygiene  Assistance Needed: Supervision or touching assistance  Comment: sba  CARE Score: 4  Discharge Goal: Independent      Toilet Transfers:    Toilet Transfer  Assistance Needed: Supervision or touching assistance  Comment: SBA  CARE Score: 4  Discharge Goal: Independent      Impairments/deficits, barriers: Decreased balance, endurance, strength, mild cognitive deficits  Assessment  Performance deficits / Impairments: Decreased functional mobility , Decreased ADL status, Decreased endurance, Decreased balance, Decreased high-level IADLs, Decreased posture, Decreased ROM, Decreased sensation  Decision Making: Medium Complexity  REQUIRES OT FOLLOW UP: Yes  Equipment needed at discharge: None      COGNITIVE FUNCTION/SPEECH THERAPY (AS INDICATED)  LTG        Nursing Current Medical Status:   [x] Is continent of bowel and bladder w/ suprapubic catheter    [] Is incontinent of bowel and bladder    [x] Has had an adequate number of bowel movements   [] Urinates with no urinary retention >300ml in bladder   [] Targeting bladder protocol with mccrary removal   [x] Maintaining O2 SATs at 92% or greater   [x] Has pain managed while on ARU         [x] Has had no skin breakdown while on ARU   [] Has improved skin integrity via wound measurements   [] Has no signs/symptoms of infection at the wound site   [] Pressure wounds Stage/Location:    [] Arrived on unit with pressure wound  [x] Has been free from injury during hospitalization   [] Has experienced a fall during hospitalization  [x] Ongoing education with patient/family with understanding demonstrated for:  [] Receives IV Fluids  [] Other:        NUTRITION  Weight: 195 lb 8.8 oz (88.7 kg) / Body mass index is 29.73 kg/m². Current diet: ADULT DIET; Regular; 5 carb choices (75 gm/meal)  Intake: Varying intake, 25-75% recent meals       Medical improvements/barriers: Progressing, leg bag has allowed ADL's to improve using adaptive equipment        Team goals for next treatment period/Intervention for current barriers:   [x] Pt will increase activity tolerance for daily tasks. [] Pt will improve bed mobility with reduced assist.  [] Pt will improve safety in fx tasks with reduced cues/assist  [x] Pt will improve transfers with reduced assist  [x] Pt will improve toileting with reduced assist  [x] Pt will improve ADL's with use of adaptive equipment with reduced assist  [] Pt will improve pain mgmt for maximum participation in tx program  [] Pt will improve communication to get basic needs met on unit  [] Pt will improve swallowing for safe diet advancement with use of strategies  []  Plan for discharge to home.      Patient Strengths: Motivated and Cooperative    Justification for Continued Stay  Based on my medical assessment of the patient and review of information from the interdisciplinary team as part of this weekly team conference, the patient continues to meet the following criteria for IRF level of care:   The patient requires active and ongoing intervention of multiple therapy disciplines   The patient requires and intensive rehabilitation therapy program   The patient requires continued physician supervision by a rehabilitation physician   The patient requires 24 hours rehab nursing care   The patient requires an intensive and coordinated interdisciplinary team approach to the delivery of rehabilitative care. Assessment/Plan   [x]  The patient is making good progression towards their long term goals and is actively participating in and has a reasonable expectation to continue to benefit from the intensive rehabilitation therapy program   []  The estimated discharge date has been changed from initial team conference due to:   []  The estimated discharge destination has been changed from initial team conference due to:         Ongoing tx following discharge: [x][facility] OT, PT    []OUTPATIENT     [] No Further Treatment     [] Family/Caregiver Training  []  Transitional Living Arrangement   [] Home Assessment (date  )     [] Family Conference   []  Therapeutic Pass       []  Other: (specify)    Estimated Discharge Date: 11/27/21    Estimated Discharge Destination: []home alone   []home alone with assist prn  []Continuous supervision []Return home with s/o/spouse/family   [] Assisted living    []SNF     Team members participating in today's conference.     [x] Darius Lantigua, Medical Director  [x] Jimena Paniagua,    [] Erik Henderson, Nurse Mgr [x] Omega Friday, Nurse Supervisor   [x]  Rocío Bhandari, PT   [] Dougie Luis, OT   []  Ximena Cross PT  [] Félix Betancourt OT      [x]  Gio Duke, SLP    []  Jaime Barrios, SLP   [] Homero Steward, SLP   []  Shad Harris, ALL     [x] Lizette Romero,     [x]Tammy Ruano,     [x] Madeleine Lehman RN[] Daniela Hammonds RN     [] Mary Altamirano, ISHMAEL    [] Jono Puga, ISHMAEL    [] Kalyan Garcia RN  [] Kalee Mao, ISHMAEL    []     I have led this Team Conference and agree with the plan, Delilah Sky MD, 11/23/2021, 12:47 PM  Goals have been updated to reflect recent status.     Team conference note transcribed this date by: Yuki Nogueira MA, [place], Therapy Coordinator

## 2021-11-23 NOTE — PROGRESS NOTES
Physical Therapy    [x] daily progress note       [] discharge       Patient Name:  Victorina Guevara   :  1940 MRN: [id number]  Room:  [address] [place], [place] [address] Date of Admission: 2021  Rehabilitation Diagnosis:   Urinary tract infection, site not specified [N39.0]  UTI (urinary tract infection) [N39.0]       Date 2021       Day of ARU Week:  6   Time IN//1050  1330/1415   Individual Tx Minutes 80+45   Group Tx Minutes    Co-Treat Minutes    Concurrent Tx Minutes    TOTAL Tx Time Mins 125   Variance Time +5   Variance Time []   Refusal due to:     []   Medical hold/reason:    []   Illness   []   Off Unit for test/procedure  [x]   Extra time needed to complete tasks  []   Therapeutic need  []   Other (specify):   Restrictions Restrictions/Precautions  Restrictions/Precautions: Fall Risk, Contact Precautions (suprapubic catheter with ESBL)      Interdisciplinary communication [x]   Cleared for therapy per nursing     []   RN notified about issues during session  []   RN updated on pt performance  []   Spoke with   []   Spoke with OT  []   Spoke with MD  [x]   Other: coordinated with PCT Jorge Jacob) request to make pt's bed as pt wanting to rest this PM     Subjective observations and cognitive status: (AM) Pt resting in bed, body poorly positioned stating \"I'm tangled in this bed. \" Hopper catheter bag in place, states sleeping a few hours, willing to participate in PT. (PM) Pt resting in w/c, alert, leg bag in place, family present in room, willing to participate in PT.     Pain level/location:    /10       Location: LUE and L lower leg (sore)   Discharge recommendations  Anticipated discharge date:  2021  Destination: []home alone   []home alone with assist PRN     [x] home w/ family      [] Continuous supervision  []SNF    [] Assisted living     [] Other:  Continued therapy: [x]HHC PT  []OUTPATIENT PT   [] No Further PT  []SNF PT  Caregiver training recommended: []Yes  [] No   Equipment needs: has rollator      PT IRF-LESTER scores and goals for discharge assessment:     Sit to Stand  Assistance Needed: Supervision or touching assistance  Comment: SBA-Sup using rollator and with leg bag in place  CARE Score: 4  Discharge Goal: Independent    Chair/Bed-to-Chair Transfer  Assistance Needed: Supervision or touching assistance  Comment: SBA-Sup using rollator and with leg bag in place  CARE Score: 4  Discharge Goal: Independent    Car Transfer  Assistance Needed: Supervision or touching assistance  Comment: SBA using rollator, leg bag in place  CARE Score: 4  Discharge Goal: Independent    Walk 10 Feet? Walk 10 Feet?: Yes    1 Step  1 Step?: Yes      Wheelchair Ability  Uses a Wheelchair and/or Scooter?: No      Walking Ability  Does the Patient Walk?: Yes    Walk 10 Feet  Assistance Needed: Supervision or touching assistance  Comment: SBA using rollator  CARE Score: 4  Discharge Goal: Independent    Walk 50 Feet with Two Turns  Assistance Needed: Supervision or touching assistance  Comment: SBA using rollator  CARE Score: 4  Discharge Goal: Independent    Walk 150 Feet  Assistance Needed: Supervision or touching assistance  Comment: SBA using RW today (pt required activity pacing technique and encouragement to complete this distance today)  CARE Score: 4  Discharge Goal: Independent    Other distances completed over level surface: 80 ft + 103 ft using rollator with SBA with leg bag in place     Walking 10 Feet on Uneven Surfaces  Assistance Needed: Supervision or touching assistance  Comment: SBA-CGA using rollator and with leg bag in place  CARE Score: 4  Discharge Goal: Independent    1 Step (Curb)  Assistance Needed: Supervision or touching assistance  Comment: CGA using rollator and with leg bag in place  CARE Score: 4  Discharge Goal: Independent  Pt lifts up rollator on ascent/descent of 4\"/6\" step height and was educated on safer technique to manage it.      4 Steps  Assistance Needed: Supervision or touching assistance  Comment: SBA using railings and with leg bag in place  CARE Score: 4  Discharge Goal: Independent    12 Steps  Comment: Pt states he could not complete full flight without resting PTA  Reason if not Attempted: Not applicable  CARE Score: 9  Discharge Goal: Not Applicable      Additional Therapeutic activities/exercises completed this date:     []   Nu-step:  Time:        Level:         #Steps:       []   Rebounder:    []  Seated     []  Standing        []   Balance training         []   Postural training    []   Supine ther ex (reps/sets):     []   Seated ther ex (reps/sets):     []   Standing ther ex (reps/sets):     [x]   Other: Toileting activity completed with CGA for clothing management in standing; pt managed leg bag to empty out urine in sitting    [x]   Other: pt took part in switching mccrary catheter to leg bag while seated at EOB; pt performed ~75% of required tasks     Comments:      Patient/Caregiver Education and Training:   []   Role of PT  []   Education about Dx  []   Use of call light for assist   []   HEP provided and explained   [x]   Treatment plan reviewed (QI tasks related to mobility that will be addressed while at ARU)   []   Home safety  []   Wheelchair mobility/management   []   Body mechanics  []   Bed Mobility/Transfer technique  []   Gait technique/sequencing  []   Proper use of assistive device/adaptive equipment  [x]   Stair training/Advanced mobility safety and technique  []   Reinforced patient's precautions/mobility while maintaining precautions  []   Postural awareness  []   Family/caregiver training  []   Progress was updated and reviewed in Rehabtracker with patient and/or family this date. []   Other:    Treatment Plan for Next Session: advanced gait training, object retrieval activity, continue with 150 ft gait training over level surface using rollator     Assessment:   This pt demonstrated a fair response to today's treatment as evidenced by resistance to changing strategy on managing rollator to ascend/descend curb step. Pt's ability to ambulate 150 ft consecutively remains to be inconsistent due to fatigue and/or self-limiting behavior but was able to complete it once today with encouragement, activity pacing, and education on importance of progressing to this mobility task.      Treatment/Activity Tolerance:   [] Tolerated treatment with no adverse effects    [x] Patient limited by fatigue  [] Patient limited by pain   [] Patient limited by medical complications:    [] Adverse reaction to Tx:   [] Significant change in status    Safety:       []  bed alarm set    [x]  chair alarm set    []  Pt refused alarms                []  Telesitter activated      [x]  Gait belt used during tx session      [x]other: son present in room       Number of Minutes/Billable Intervention  Gait Training 75   Therapeutic Exercise    Neuro Re-Ed    Therapeutic Activity 50   Wheelchair Propulsion    Group    Other:    TOTAL 125     Social History  Social/Functional History  Lives With:  Franklyn Bass, home most of day)  Type of Home: House  Home Layout: One level  Home Access: Stairs to enter without rails  Entrance Stairs - Number of Steps: 2 GERMAIN  Bathroom Shower/Tub: Tub/Shower unit  Bathroom Toilet: Standard (also has vanity on R to assist with transfer)  Bathroom Equipment: Grab bars in shower, Shower chair, Hand-held shower, Grab bars around toilet  Bathroom Accessibility: Walker accessible  Home Equipment: Cane, Rolling walker, 4 wheeled walker, Rj [employer] (also has BSC but doesn't use)  Receives Help From: Home health ([facility] RN 1x/week but just finished C OT)  ADL Assistance: Independent  Homemaking Assistance: Independent  Homemaking Responsibilities: Yes (Shares with son)  Laundry Responsibility: Primary  Health Care Management: Primary (has pillbox with alarm)  Ambulation Assistance: Independent (mod I with 4ww, walks short community distances, but uses electric carts in stores)  Transfer Assistance: Independent  Active : No  Patient's  Info: Sons drive. \"They think I'm never gonna drive again, I've got news for them\"  Additional Comments: Pt typically sleeps in a flat, regular bed at home. Pt reports multiple falls (>5) in the past week before hospitalization, but no injuries. Pt's wife recently passed away in September. Objective                                                                                    Goals:  (Update in navigator)   : Long term goals  Time Frame for Long term goals : 7days  Long term goal 1: Pt will perform all bed mobility with independence  Long term goal 2: Pt will perform sit to stand, pivot and car transfers  with mod i  Long term goal 3: Pt will ambulate 150  feet on level surfaces and 10' on unlevel surfaces with mod I  using 4ww  Long term goal 4: Pt will ascend/descend curb step with   4ww   and up to 4    steps with  rail(s) with    mod I  Long term goal 5: Pt will retrieve light item from floor with mod i  using 4ww and reacher:        Plan of Care                                                                              Times per week: 5 days per week for a minimum of 60 minutes/day plus group as appropriate for 60 minutes.   Treatment to include Current Treatment Recommendations: Strengthening, ROM, Balance Training, Functional Mobility Training, Transfer Training, ADL/Self-care Training, Gait Training, Patient/Caregiver Education & Training, IADL Training, Stair training, Equipment Evaluation, Education, & procurement, Neuromuscular Re-education, Home Exercise Program, Positioning, Endurance Training, Safety Education & Training, Cognitive/Perceptual Training    Electronically signed by   Connie Porras, PT  11/23/2021, 3:58 PM

## 2021-11-23 NOTE — CARE COORDINATION
Patient reviewed at today's care conference. Patient will dc home with son 11/27. Tyrell 78 pt/ot recommended. Patient has no new DME needs. Patient manages his own leg bag. Case mgt updated patient and family in room following care conference. Patient has [facility]. Case mgt will bring patient MOW brochures at his request.  Patient states his last day of IV antibiotic is 11/28. Family is wondering if he can stay through his 11/28 dose. Case mgt will speak with Dr Jyothi Mcmahon. Whiteboard updated. Perfect served Dr Jyothi Mcmahon re: alternative dc date.

## 2021-11-23 NOTE — PROGRESS NOTES
Physical Rehabilitation: OCCUPATIONAL THERAPY     [x] daily progress note       [] discharge       Patient Name:  Victorina Guevara   :  1940 MRN: [id number]  Room:  [address] [place], [place] [address] Date of Admission: 2021  Rehabilitation Diagnosis:   Urinary tract infection, site not specified [N39.0]  UTI (urinary tract infection) [N39.0]       Date 2021       Day of ARU Week:  6   Time IN/OUT 1100/1200   Individual Tx Minutes 60   Group Tx Minutes    Co-Treat Minutes    Concurrent Tx Minutes    TOTAL Tx Time Mins 60   Variance Time    Variance Time []   Refusal due to:     []   Medical hold/reason:    []   Illness   []   Off Unit for test/procedure  []   Extra time needed to complete task  []   Therapeutic need  []   Other (specify):   Restrictions Restrictions/Precautions: Fall Risk, Contact Precautions (suprapubic catheter with ESBL)         Communication with other providers: [x]   OK to see per nursing:     []   Spoke with team member regarding:      Subjective observations and cognitive status: Patient c/o not sleeping weell here however pleasant and agreeable to therapy      Pain level/location:    /10       Location: per patient no pain noted    Discharge recommendations  Anticipated discharge date:  TBD  Destination: []home alone   []home alone w assist prn   [x] home w/ family    [] Continuous supervision       []SNF    [] Assisted living     [] Other:   Continued therapy: [x]HHC OT  []OUTPATIENT  OT   [] No Further OT  Equipment needs: None        ADLs:    Eating: Eating  Assistance Needed: Independent  Comment: X  CARE Score: 6  Discharge Goal: Independent       Oral Hygiene: Oral Hygiene  Assistance Needed: Independent  Comment: X  CARE Score: 6  Discharge Goal: Independent    UB/LB Bathing: Shower/Bathe Self  Assistance Needed: Supervision or touching assistance  Comment: SBA uses reacher with washcloth to wash distal B LE, introduce LHS next ADL  CARE Score: 4  Discharge Goal: Independent    UB Dressing: Upper Body Dressing  Assistance Needed: Independent  Comment: X  CARE Score: 6  Discharge Goal: Independent         LB Dressing: Lower Body Dressing  Assistance Needed: Supervision or touching assistance  Comment: SBA uses reacher to thread B LE  CARE Score: 4  Discharge Goal: Independent    Donning and [place] Footwear: Putting On/Taking Off Footwear  Assistance Needed: Independent  Comment: using sock aide  CARE Score: 6  Discharge Goal: Independent      Toileting: Toileting Hygiene  Assistance Needed: Supervision or touching assistance  Comment: sba  CARE Score: 4  Discharge Goal: Independent      Toilet Transfers:     Toilet Transfer  Assistance Needed: Supervision or touching assistance  Comment: SBA  CARE Score: 4  Discharge Goal: Independent  Device Used:    [x]   Standard Toilet         [x]   Grab Bars           []  Bedside Commode       []   Elevated Toilet          []   Other:        Bed Mobility:           [x]   Pt received out of bed   Rolling R/L:    Scooting:    Supine --> Sit:    Sit --> Supine:      Transfers:    Sit--> Stand:  Sup  Stand --> Sit:   Sup  Stand-Pivot:   SBA  Other:    Assistive device required for transfer:   4WW      Functional Mobility:   Throughout room/bathroom   Assistance:  SBA  Device:   []   Rolling Walker     []   Standard Walker []   Wheelchair        []   [employer]       [x]   4-Wheeled Evie Vickers         []   Cardiac Evie Alee       []   Other:        Homemaking Tasks:   Patient retrieves clothing from closet and transports to bathroom in prep of shower at [place] level       Additional Therapeutic activities/exercises completed this date:     [x]   ADL Training   [x]   Balance/Postural training patient instructed in dynamic standing tolerance/balance activity with patient standing 3  Minutes  Times two while reaching outside TYSHAWN and crossing midline to facilitate ADL and community reentry level mobility tasks     [x]   Bed/Transfer Training   []   Endurance Training   [] Neuromuscular Re-ed   []   Nu-step:  Time:        Level:         #Steps:       []   Rebounder:    []  Seated     []  Standing        []   Supine Ther Ex (reps/sets):     []   Seated Ther Ex (reps/sets):     []   Standing Ther Ex (reps/sets):     []   Other:      Comments: This therapist attempts to address patients request for a leg bag however patient already has leg bag on and is monitoring for need to empty and care for     Patient/Caregiver Education and Training:   [x]   Adaptive Equipment Use  [x]   Bed Mobility/Transfer Technique/Safety  [x]   Energy Conservation Tips  []   Family training  [x]   Postural Awareness  [x]   Safety During Functional Activities  []   Reinforced Patient's Precautions   []   Progress was updated and reviewed in Rehabtracker with patient and/or family this         date.     Treatment Plan for Next Session: POC to continue as tolerated         Treatment/Activity Tolerance:   [x] Tolerated treatment with no adverse effects    [] Patient limited by fatigue  [] Patient limited by pain   [] Patient limited by medical complications:    [] Adverse reaction to Tx:   [] Significant change in status    Safety:       []  bed alarm set    [x]  chair alarm set    []  Pt refused alarms                []  Telesitter activated      [x]  Gait belt used during tx session      []other:       Number of Minutes/Billable Intervention  Therapeutic Exercise    ADL Self-care 45   Neuro Re-Ed    Therapeutic Activity 15   Group    Other:    TOTAL 60       Social History  Social/Functional History  Lives With:  Sara Sandoval, home most of day)  Type of Home: House  Home Layout: One level  Home Access: Stairs to enter without rails  Entrance Stairs - Number of Steps: 2 GERMAIN  Bathroom Shower/Tub: Tub/Shower unit  Bathroom Toilet: Standard (also has vanity on R to assist with transfer)  Bathroom Equipment: Grab bars in shower, Shower chair, Hand-held shower, Grab bars around toilet  Bathroom Accessibility: Mi Buck accessible  Home Equipment: Cane, Rolling walker, 4 wheeled walker, [employer] (also has BSC but doesn't use)  Receives Help From: Home health (Tyrell Hodge RN 1x/week but just finished Tyrell Hodge OT)  ADL Assistance: [address]: Independent  Homemaking Responsibilities: Yes (Shares with son)  Laundry Responsibility: Primary  Health Care Management: Primary (has pillbox with alarm)  Ambulation Assistance: Independent (mod I with 4ww, walks short community distances, but uses electric carts in stores)  Transfer Assistance: Independent  Active : No  Patient's  Info: Sons drive. \"They think I'm never gonna drive again, I've got news for them\"  Additional Comments: Pt typically sleeps in a flat, regular bed at home. Pt reports multiple falls (>5) in the past week before hospitalization, but no injuries. Pt's wife recently passed away in September.     Objective                                                                                    Goals:  (Update in navigator)  Short term goals  Time Frame for Short term goals: STGs=LTGs:  Long term goals  Time Frame for Long term goals : 7-10 days or until d/c  Long term goal 1: Pt will complete grooming tasks Ind  Long term goal 2: Pt will complete total body bathing mod I  Long term goal 3: Pt will complete UB dressing Ind  Long term goal 4: Pt will complete LB dressing Ind  Long term goal 5: Pt will doff/don footwear Ind  Long term goals 6: Pt will complete toileting mod I  Long term goal 7: Pt will complete functional transfers (bed, chair, toilet, shower) c DME PRN and mod I  Long term goal 8: Pt will perform therex/therax to facilitate increased strength/endurance/ax tolerance (c emphasis on dynamic standing balance/tolerance >8 mins, BUE endurance) c SBA  Long term goal 9: Pt will complete simple homemaking tasks c DME PRN and mod I:        Plan of Care                                                                              Times per week: 5 days per week for a minimum of 60 minutes/day plus group as appropriate for 60 minutes.   Treatment to include Plan  Times per day: Daily  Current Treatment Recommendations: Strengthening, Balance Training, Functional Mobility Training, Endurance Training, Safety Education & Training, Patient/Caregiver Education & Training, Equipment Evaluation, Education, & procurement, Self-Care / ADL, Home Management Training    Electronically signed by   KHARI Charles,  11/23/2021, 11:44 AM

## 2021-11-23 NOTE — PLAN OF CARE
Problem: Falls - Risk of:  Goal: Will remain free from falls  Description: Will remain free from falls  11/23/2021 1050 by Puja Roque RN  Outcome: Ongoing  11/23/2021 1050 by Puja Roque RN  Outcome: Ongoing  11/22/2021 2258 by Chloe Chery LPN  Outcome: Ongoing  Goal: Absence of physical injury  Description: Absence of physical injury  11/23/2021 1050 by Puja Roque RN  Outcome: Ongoing  11/23/2021 1050 by Puja Roque RN  Outcome: Ongoing  11/22/2021 2258 by Chloe Chery LPN  Outcome: Ongoing     Problem: Skin Integrity:  Goal: Will show no infection signs and symptoms  Description: Will show no infection signs and symptoms  11/23/2021 1050 by Puja Roque RN  Outcome: Ongoing  11/23/2021 1050 by Puja Roque RN  Outcome: Ongoing  11/22/2021 2258 by Chloe Chery LPN  Outcome: Ongoing  Goal: Absence of new skin breakdown  Description: Absence of new skin breakdown  11/23/2021 1050 by Puja Roque RN  Outcome: Ongoing  11/23/2021 1050 by Puja Roque RN  Outcome: Ongoing  11/22/2021 2258 by Chloe Chery LPN  Outcome: Ongoing     Problem: Infection:  Goal: Will remain free from infection  Description: Will remain free from infection  11/23/2021 1050 by Puja Roque RN  Outcome: Ongoing  11/23/2021 1050 by Puja Roque RN  Outcome: Ongoing  11/22/2021 2258 by Chloe Chery LPN  Outcome: Ongoing     Problem: Safety:  Goal: Free from accidental physical injury  Description: Free from accidental physical injury  11/23/2021 1050 by Puja Roque RN  Outcome: Ongoing  11/23/2021 1050 by Puja Roque RN  Outcome: Ongoing  11/22/2021 2258 by Chloe Chery LPN  Outcome: Ongoing  Goal: Free from intentional harm  Description: Free from intentional harm  11/23/2021 1050 by Puja Roque RN  Outcome: Ongoing  11/23/2021 1050 by Puja Roque RN  Outcome: Ongoing  11/22/2021 2258 by Chloe Chery LPN  Outcome: Ongoing     Problem: Daily Care:  Goal: Daily care needs are met  Description: Daily care needs are met  11/23/2021 1050 by Arsh Sidhu ISHMAEL Sanchez  Outcome: Ongoing  11/23/2021 1050 by Loni Soulier, RN  Outcome: Ongoing  11/22/2021 2258 by Jesi Moscoso LPN  Outcome: Ongoing     Problem: Pain:  Goal: Patient's pain/discomfort is manageable  Description: Patient's pain/discomfort is manageable  11/23/2021 1050 by Loni Soulier, RN  Outcome: Ongoing  11/23/2021 1050 by Loni Soulier, RN  Outcome: Ongoing  11/22/2021 2258 by Jesi Moscoso LPN  Outcome: Ongoing     Problem: Skin Integrity:  Goal: Skin integrity will stabilize  Description: Skin integrity will stabilize  11/23/2021 1050 by Loni Soulier, RN  Outcome: Ongoing  11/23/2021 1050 by Loni Soulier, RN  Outcome: Ongoing  11/22/2021 2258 by Jesi Moscoso LPN  Outcome: Ongoing     Problem: Discharge Planning:  Goal: Patients continuum of care needs are met  Description: Patients continuum of care needs are met  11/23/2021 1050 by Loni Soulier, RN  Outcome: Ongoing  11/23/2021 1050 by Loni Soulier, RN  Outcome: Ongoing  11/22/2021 2258 by Jesi Moscoso LPN  Outcome: Ongoing

## 2021-11-24 LAB
GLUCOSE BLD-MCNC: 147 MG/DL (ref 70–99)
GLUCOSE BLD-MCNC: 156 MG/DL (ref 70–99)
GLUCOSE BLD-MCNC: 206 MG/DL (ref 70–99)
GLUCOSE BLD-MCNC: 227 MG/DL (ref 70–99)

## 2021-11-24 PROCEDURE — 94761 N-INVAS EAR/PLS OXIMETRY MLT: CPT

## 2021-11-24 PROCEDURE — 82962 GLUCOSE BLOOD TEST: CPT

## 2021-11-24 PROCEDURE — 97116 GAIT TRAINING THERAPY: CPT

## 2021-11-24 PROCEDURE — 97535 SELF CARE MNGMENT TRAINING: CPT

## 2021-11-24 PROCEDURE — 6360000002 HC RX W HCPCS: Performed by: NURSE PRACTITIONER

## 2021-11-24 PROCEDURE — 97110 THERAPEUTIC EXERCISES: CPT

## 2021-11-24 PROCEDURE — 2580000003 HC RX 258: Performed by: NURSE PRACTITIONER

## 2021-11-24 PROCEDURE — 1280000000 HC REHAB R&B

## 2021-11-24 PROCEDURE — 6370000000 HC RX 637 (ALT 250 FOR IP): Performed by: PHYSICAL MEDICINE & REHABILITATION

## 2021-11-24 PROCEDURE — 6360000002 HC RX W HCPCS: Performed by: PHYSICAL MEDICINE & REHABILITATION

## 2021-11-24 PROCEDURE — 97530 THERAPEUTIC ACTIVITIES: CPT

## 2021-11-24 PROCEDURE — 94150 VITAL CAPACITY TEST: CPT

## 2021-11-24 PROCEDURE — 6370000000 HC RX 637 (ALT 250 FOR IP): Performed by: NURSE PRACTITIONER

## 2021-11-24 PROCEDURE — 99232 SBSQ HOSP IP/OBS MODERATE 35: CPT | Performed by: PHYSICAL MEDICINE & REHABILITATION

## 2021-11-24 RX ADMIN — ACETAMINOPHEN 650 MG: 325 TABLET ORAL at 22:31

## 2021-11-24 RX ADMIN — MEROPENEM 1000 MG: 1 INJECTION INTRAVENOUS at 09:12

## 2021-11-24 RX ADMIN — SODIUM CHLORIDE, PRESERVATIVE FREE 10 ML: 5 INJECTION INTRAVENOUS at 08:21

## 2021-11-24 RX ADMIN — POLYETHYLENE GLYCOL (3350) 17 G: 17 POWDER, FOR SOLUTION ORAL at 01:26

## 2021-11-24 RX ADMIN — ACETAMINOPHEN 650 MG: 325 TABLET ORAL at 05:17

## 2021-11-24 RX ADMIN — INSULIN LISPRO 2 UNITS: 100 INJECTION, SOLUTION INTRAVENOUS; SUBCUTANEOUS at 16:52

## 2021-11-24 RX ADMIN — INSULIN LISPRO 1 UNITS: 100 INJECTION, SOLUTION INTRAVENOUS; SUBCUTANEOUS at 09:12

## 2021-11-24 RX ADMIN — AMLODIPINE BESYLATE 10 MG: 10 TABLET ORAL at 08:20

## 2021-11-24 RX ADMIN — ALOGLIPTIN 6.25 MG: 12.5 TABLET, FILM COATED ORAL at 08:20

## 2021-11-24 RX ADMIN — MEROPENEM 1000 MG: 1 INJECTION INTRAVENOUS at 22:19

## 2021-11-24 RX ADMIN — LABETALOL HYDROCHLORIDE 200 MG: 100 TABLET, FILM COATED ORAL at 22:11

## 2021-11-24 RX ADMIN — HYDRALAZINE HYDROCHLORIDE 25 MG: 25 TABLET ORAL at 05:17

## 2021-11-24 RX ADMIN — OXYBUTYNIN CHLORIDE 5 MG: 5 TABLET, FILM COATED, EXTENDED RELEASE ORAL at 22:12

## 2021-11-24 RX ADMIN — INSULIN GLARGINE 10 UNITS: 100 INJECTION, SOLUTION SUBCUTANEOUS at 22:23

## 2021-11-24 RX ADMIN — LISINOPRIL 5 MG: 5 TABLET ORAL at 08:19

## 2021-11-24 RX ADMIN — SODIUM CHLORIDE, PRESERVATIVE FREE 10 ML: 5 INJECTION INTRAVENOUS at 22:13

## 2021-11-24 RX ADMIN — HYDRALAZINE HYDROCHLORIDE 25 MG: 25 TABLET ORAL at 22:11

## 2021-11-24 RX ADMIN — INSULIN LISPRO 2 UNITS: 100 INJECTION, SOLUTION INTRAVENOUS; SUBCUTANEOUS at 12:08

## 2021-11-24 RX ADMIN — ENOXAPARIN SODIUM 40 MG: 100 INJECTION SUBCUTANEOUS at 08:19

## 2021-11-24 RX ADMIN — HYDRALAZINE HYDROCHLORIDE 25 MG: 25 TABLET ORAL at 13:53

## 2021-11-24 ASSESSMENT — PAIN DESCRIPTION - LOCATION
LOCATION: GENERALIZED
LOCATION: ANKLE

## 2021-11-24 ASSESSMENT — PAIN DESCRIPTION - ORIENTATION: ORIENTATION: LEFT

## 2021-11-24 ASSESSMENT — PAIN SCALES - GENERAL
PAINLEVEL_OUTOF10: 5
PAINLEVEL_OUTOF10: 6
PAINLEVEL_OUTOF10: 0
PAINLEVEL_OUTOF10: 0

## 2021-11-24 ASSESSMENT — PAIN DESCRIPTION - FREQUENCY
FREQUENCY: INTERMITTENT
FREQUENCY: INTERMITTENT

## 2021-11-24 ASSESSMENT — PAIN DESCRIPTION - PROGRESSION: CLINICAL_PROGRESSION: NOT CHANGED

## 2021-11-24 ASSESSMENT — PAIN DESCRIPTION - DESCRIPTORS
DESCRIPTORS: DISCOMFORT
DESCRIPTORS: DISCOMFORT

## 2021-11-24 ASSESSMENT — PAIN DESCRIPTION - PAIN TYPE
TYPE: CHRONIC PAIN
TYPE: CHRONIC PAIN

## 2021-11-24 ASSESSMENT — PAIN DESCRIPTION - ONSET
ONSET: ON-GOING
ONSET: ON-GOING

## 2021-11-24 ASSESSMENT — PAIN - FUNCTIONAL ASSESSMENT: PAIN_FUNCTIONAL_ASSESSMENT: PREVENTS OR INTERFERES SOME ACTIVE ACTIVITIES AND ADLS

## 2021-11-24 NOTE — PROGRESS NOTES
Physical Rehabilitation: OCCUPATIONAL THERAPY     [x] daily progress note       [] discharge       Patient Name:  Madisyn Tucker   :  1940 MRN: [id number]  Room:  [address] [place] [address] Date of Admission: 2021  Rehabilitation Diagnosis:   Urinary tract infection, site not specified [N39.0]  UTI (urinary tract infection) [N39.0]       Date 2021       Day of ARU Week:  7   Time IN//905; 1030/1130   Individual Tx Minutes 60 + 60   Group Tx Minutes    Co-Treat Minutes    Concurrent Tx Minutes    TOTAL Tx Time Mins 120   Variance Time    Variance Time []   Refusal due to:     []   Medical hold/reason:    []   Illness   []   Off Unit for test/procedure  []   Extra time needed to complete task  []   Therapeutic need  []   Other (specify):   Restrictions Restrictions/Precautions: Fall Risk, Contact Precautions (suprapubic catheter with ESBL)         Communication with other providers: [x]   OK to see per nursing:     []   Spoke with team member regarding:      Subjective observations and cognitive status: Patient supine in bed, asking for anew breakfast, therapist calls dietary and gets new breakfast coming, patient pleasant and agreeable to shower while waiting for new breakfast to arrive    Pain level/location:    /10       Location: per patient no pain noted    Discharge recommendations  Anticipated discharge date:    Destination: []home alone   []home alone w assist prn   [x] home w/ family    [] Continuous supervision       []SNF    [] Assisted living     [] Other:   Continued therapy: []HHC OT  []OUTPATIENT  OT   [] No Further OT  Equipment needs: None        ADLs:    Eating: Eating  Assistance Needed: Independent  Comment: X  CARE Score: 6  Discharge Goal: Independent       Oral Hygiene: Oral Hygiene  Assistance Needed: Independent  Comment: X  CARE Score: 6  Discharge Goal: Independent    UB/LB Bathing: Shower/Bathe Self  Assistance Needed: Supervision or touching assistance  Comment: Sup  CARE Score: 4  Discharge Goal: Independent    UB Dressing: Upper Body Dressing  Assistance Needed: Independent  Comment: X  CARE Score: 6  Discharge Goal: Independent         LB Dressing: Lower Body Dressing  Assistance Needed: Supervision or touching assistance  Comment: Sup uses reacher to thread feet through pants, manages catheter changing from overnight to leg bag  CARE Score: 4  Discharge Goal: Independent    Donning and [place] Footwear: Putting On/Taking Off Footwear  Assistance Needed: Independent  Comment: Mod I using sock aide  CARE Score: 6  Discharge Goal: Independent      Toileting: Toileting Hygiene  Assistance Needed: Supervision or touching assistance  Comment: Sup  CARE Score: 4  Discharge Goal: Independent      Toilet Transfers: Toilet Transfer  Assistance Needed: Supervision or touching assistance  Comment: Sup  CARE Score: 4  Discharge Goal: Independent  Device Used:    [x]   Standard Toilet         [x]   Grab Bars           []  Bedside Commode       []   Elevated Toilet          []   Other:        Bed Mobility:           []   Pt received out of bed   Rolling R/L:  Mod I   Scooting:   Mod I   Supine --> Sit:  Mod I   Sit --> Supine:      Transfers:    Sit--> Stand:  Sup  Stand --> Sit:   Sup  Stand-Pivot:   SUp  Other:    Assistive device required for transfer:   4WW      Functional Mobility:  Throughout room/bathroom; throughout ARU hallways with 2 seated rest breaks   Assistance:  Sup  Device:   []   Rolling Walker     []   Standard Walker []   Wheelchair        []   [employer]       [x]   4-Wheeled Lige Fling         []   Cardiac Lige Fling       []   Other:        Homemaking Tasks:   Patient retrieves clothing from bag on countertop and transports clothing to bathroom in prep for ADl at [place] level       Additional Therapeutic activities/exercises completed this date:     [x]   ADL Training   [x]   Balance/Postural training patient instructed in safe dynamic standing balance task at car matching game reaching out of TYSHAWN and crossing midline utilizing B UE for 5 minutes times two while standing unsupported to facilitate ADL tasks and community reentry tasks      [x]   Bed/Transfer Training   []   Endurance Training   []   Neuromuscular Re-ed   []   Nu-step:  Time:        Level:         #Steps:       [x]   Rebounder:    []  Seated     [x]  Standing patient instructed in dynamic standing tolerance task 3 sets of 20 to increase standing tolerance  To facilitate ADL        []   Supine Ther Ex (reps/sets):     [x]   Seated Ther Ex (reps/sets):  Patient instructed in B UE exercises with 3# dowel and 2# dumbbell all planes and all joints to increase strength and endurance for facilitation of ADL and mobility tasks    []   Standing Ther Ex (reps/sets):     []   Other:      Comments:      Patient/Caregiver Education and Training:   [x]   [employer] Equipment Use  [x]   Bed Mobility/Transfer Technique/Safety  [x]   Energy Conservation Tips  []   Family training  [x]   Postural Awareness  [x]   Safety During Functional Activities  []   Reinforced Patient's Precautions   []   Progress was updated and reviewed in Rehabtracker with patient and/or family this         date. Treatment Plan for Next Session: POC to continue as tolerated         Treatment/Activity Tolerance:   [x] Tolerated treatment with no adverse effects    [] Patient limited by fatigue  [] Patient limited by pain   [] Patient limited by medical complications:    [] Adverse reaction to Tx:   [] Significant change in status    Safety:       [x]  bed alarm set    []  chair alarm set    []  Pt refused alarms                []  Telesitter activated      [x]  Gait belt used during tx session      []other:       Number of Minutes/Billable Intervention  Therapeutic Exercise 15   ADL Self-care 45   Neuro Re-Ed    Therapeutic Activity 15 + 45   Group    Other:     Total: 120       Social History  Social/Functional History  Lives With:  Monica Garcia, home most of day)  Type of Home: House  Home Layout: One level  Home Access: Stairs to enter without rails  Entrance Stairs - Number of Steps: 2 GERMAIN  Bathroom Shower/Tub: Tub/Shower unit  Bathroom Toilet: Standard (also has vanity on R to assist with transfer)  Bathroom Equipment: Grab bars in shower, Shower chair, Hand-held shower, Grab bars around toilet  Bathroom Accessibility: Walker accessible  Home Equipment: Cane, Rolling walker, 4 wheeled walker, Maia [employer] (also has BSC but doesn't use)  Receives Help From: Home health ([facility] [place] but just finished [facility] OT)  ADL Assistance: Independent  Homemaking Assistance: Independent  Homemaking Responsibilities: Yes (Shares with son)  Laundry Responsibility: Primary  Health Care Management: Primary (has pillbox with alarm)  Ambulation Assistance: Independent (mod I with 4ww, walks short community distances, but uses electric carts in stores)  Transfer Assistance: Independent  Active : No  Patient's  Info: Sons drive. \"They think I'm never gonna drive again, I've got news for them\"  Additional Comments: Pt typically sleeps in a flat, regular bed at home. Pt reports multiple falls (>5) in the past week before hospitalization, but no injuries. Pt's wife recently passed away in September.     Objective                                                                                    Goals:  (Update in navigator)  Short term goals  Time Frame for Short term goals: STGs=LTGs:  Long term goals  Time Frame for Long term goals : 7-10 days or until d/c  Long term goal 1: Pt will complete grooming tasks Ind  Long term goal 2: Pt will complete total body bathing mod I  Long term goal 3: Pt will complete UB dressing Ind  Long term goal 4: Pt will complete LB dressing Ind  Long term goal 5: Pt will doff/don footwear Ind  Long term goals 6: Pt will complete toileting mod I  Long term goal 7: Pt will complete functional transfers (bed, chair, toilet, shower) c DME PRN and mod I  Long term goal 8: Pt will perform therex/therax to facilitate increased strength/endurance/ax tolerance (c emphasis on dynamic standing balance/tolerance >8 mins, BUE endurance) c SBA  Long term goal 9: Pt will complete simple homemaking tasks c DME PRN and mod I:        Plan of Care                                                                              Times per week: 5 days per week for a minimum of 60 minutes/day plus group as appropriate for 60 minutes.   Treatment to include Plan  Times per day: Daily  Current Treatment Recommendations: Strengthening, Balance Training, Functional Mobility Training, Endurance Training, Safety Education & Training, Patient/Caregiver Education & Training, Equipment Evaluation, Education, & procurement, Self-Care / ADL, Home Management Training    Electronically signed by   KHARI Jessica,  11/24/2021, 10:45 AM

## 2021-11-24 NOTE — CARE COORDINATION
JADAW saw note from [place], [address] stating patient D/C date may move back. JADAW checked and was informed that Dr. Caleb Chavez has agreed for patient to D/C on the 28th instead of the 27th as IV antibiotics are due to end that day. Treatment team notified.

## 2021-11-24 NOTE — PROGRESS NOTES
Physical Therapy    [x] daily progress note       [] discharge       Patient Name:  Puneet Rodriguez   :  1940 MRN: [id number]  Room:  [address] [place] [address] Date of Admission: 2021  Rehabilitation Diagnosis:   Urinary tract infection, site not specified [N39.0]  UTI (urinary tract infection) [N39.0]       Date 2021       Day of ARU Week:  7   Time IN//1030   Individual Tx Minutes 60   Group Tx Minutes    Co-Treat Minutes    Concurrent Tx Minutes    TOTAL Tx Time Mins 60   Variance Time    Variance Time []   Refusal due to:     []   Medical hold/reason:    []   Illness   []   Off Unit for test/procedure  []   Extra time needed to complete task  []   Therapeutic need  []   Other (specify):   Restrictions Restrictions/Precautions  Restrictions/Precautions: Fall Risk, Contact Precautions (suprapubic catheter with ESBL)      Interdisciplinary communication [x]   Cleared for therapy per nursing     []   RN notified about issues during session  []   RN updated on pt performance  []   Spoke with   []   Spoke with OT  []   Spoke with MD  []   Other:    Subjective observations and cognitive status: Pt in w/c, alert, expressed frustration about his breakfast, had some sleep, actively receiving IV meds at the beginning of this PT session, leg bag in place and states \"I did it by myself. \"       Pain level/location:    /10       Location:    Discharge recommendations  Anticipated discharge date: 2021  Destination: []home alone   []home alone with assist PRN     [x] home w/ family      [] Continuous supervision  []SNF    [] Assisted living     [] Other:  Continued therapy: [x]C PT  []OUTPATIENT PT   [] No Further PT  []SNF PT  Caregiver training recommended: []Yes  [] No   Equipment needs: has rollator      Bed Mobility:           [x]   Pt received out of bed     Transfers:    Sit--> Stand:  SBA-Sup  Stand --> Sit:   SBA-Sup  Assistive device used for transfer:   rollator     Gait:    Distance: 150 ft with turns    Assistance:  SBA  Device:  rollator   Gait Quality:  Slow héctor, step-through, decreased stride length     Stairs   # Completed:  5 (max; limited by fatigue)  Assistance:  SBA (usual)->CGA on descent of 6\" step height (only occurred once)   Supportive Device:  Railings   Pattern: step-through on ascent/descent     Curb   Height:  4\"  Assistance:  CGA   Device:  rollator     Uneven Surfaces:       Assistance:   SBA   Device:    rollator   Surfaces Completed:   [x]  carpet with bean bags beneath      []  Throw rugs       []  Ramp       []  Outdoor pavements        []  Grass             []  Loose gravel        []  Other:  carpet/transitional surface     Picking up of object from the floor:       Assistance:   CGA   Device used to assist with standing balance: rollator   [x]   Reacher used    Patient/Caregiver Education and Training:   []   Role of PT  []   Education about Dx  []   Use of call light for assist   []   HEP provided and explained   [x]   Treatment plan reviewed  []   Home safety  []   Wheelchair mobility/management   []   Body mechanics  []   Bed Mobility/Transfer technique  []   Gait technique/sequencing  []   Proper use of assistive device/adaptive equipment  [x]   Stair training/Advanced mobility safety and technique  []   Reinforced patient's precautions/mobility while maintaining precautions  []   Postural awareness  []   Family/caregiver training  [x]   Progress was updated and reviewed with patient this date. []   Other:    Treatment Plan for Next Session: discharge QI tasks     Assessment: This pt demonstrated a positive response to today's treatment as evidenced by tolerance to progressive gait training. The patient is making good progress toward established goals as evidenced by QI scores. Pt responded positively when PT goal/expectation was dicussed prior to performing mobility task.      Treatment/Activity Tolerance:   [] Tolerated treatment with no adverse effects [x] Patient limited by fatigue  [] Patient limited by pain   [] Patient limited by medical complications:    [] Adverse reaction to Tx:   [] Significant change in status    Safety:       []  bed alarm set    []  chair alarm set    []  Pt refused alarms                []  Telesitter activated      []  Gait belt used during tx session      []other:       Number of Minutes/Billable Intervention  Gait Training 40   Therapeutic Exercise    Neuro Re-Ed    Therapeutic Activity 20   Wheelchair Propulsion    Group    Other:    TOTAL 60       Social History  Social/Functional History  Lives With:  [facility], home most of day)  Type of Home: House  Home Layout: One level  Home Access: Stairs to enter without rails  Entrance Stairs - Number of Steps: 2 GERMAIN  Bathroom Shower/Tub: Tub/Shower unit  Bathroom Toilet: Standard (also has vanity on R to assist with transfer)  Bathroom Equipment: Grab bars in shower, Shower chair, Hand-held shower, Grab bars around toilet  Bathroom Accessibility: Walker accessible  Home Equipment: Cane, Rolling walker, 4 wheeled walker, [employer] (also has BSC but doesn't use)  Receives Help From: [employer] ([facility] but just finished [place] [id number] OT)  ADL Assistance: Independent  Homemaking Assistance: Independent  Homemaking Responsibilities: Yes (Shares with son)  Laundry Responsibility: Primary  Health Care Management: Primary (has pillbox with alarm)  Ambulation Assistance: Independent (mod I with 4ww, walks short community distances, but uses electric carts in stores)  Transfer Assistance: Independent  Active : No  Patient's  Info: Sons drive. \"They think I'm never gonna drive again, I've got news for them\"  Additional Comments: Pt typically sleeps in a flat, regular bed at home. Pt reports multiple falls (>5) in the past week before hospitalization, but no injuries. Pt's wife recently passed away in September.     Objective Goals:  (Update in navigator)   : Long term goals  Time Frame for Long term goals : 7days  Long term goal 1: Pt will perform all bed mobility with independence  Long term goal 2: Pt will perform sit to stand, pivot and car transfers  with mod i  Long term goal 3: Pt will ambulate 150  feet on level surfaces and 10' on unlevel surfaces with mod I  using 4ww  Long term goal 4: Pt will ascend/descend curb step with   4ww   and up to 4    steps with  rail(s) with    mod I  Long term goal 5: Pt will retrieve light item from floor with mod i  using 4ww and reacher:        Plan of Care                                                                              Times per week: 5 days per week for a minimum of 60 minutes/day plus group as appropriate for 60 minutes.   Treatment to include Current Treatment Recommendations: Strengthening, ROM, Balance Training, Functional Mobility Training, Transfer Training, ADL/Self-care Training, Gait Training, Patient/Caregiver Education & Training, IADL Training, Stair training, Equipment Evaluation, Education, & procurement, Neuromuscular Re-education, Home Exercise Program, Positioning, Endurance Training, Safety Education & Training, Cognitive/Perceptual Training    Electronically signed by   Kellen Mcnamara PT  11/24/2021, 8:29 AM

## 2021-11-24 NOTE — PROGRESS NOTES
Alessandro Lower    : 1940  Acct #: [de-identified]  MRN: [id number]              PM&R Progress Note      Admitting diagnosis: Urinary tract infection ([facility] 16)     Comorbid diagnoses impacting rehabilitation: Generalized weakness, gait disturbance, chronic kidney disease stage IIIa, essential hypertension, frequent falls, uncontrolled diabetes type 2 with peripheral neuropathy, status post prostate cancer with indwelling suprapubic catheter    Chief complaint: A little less dizziness with position change. No chills or low abdominal pain. Prior (baseline) level of function: Independent.     Current level of function:         Current  IRF-LESTER and Goals:   Occupational Therapy:    Short term goals  Time Frame for Short term goals: STGs=LTGs :   Long term goals  Time Frame for Long term goals : 7-10 days or until d/c  Long term goal 1: Pt will complete grooming tasks Ind  Long term goal 2: Pt will complete total body bathing mod I  Long term goal 3: Pt will complete UB dressing Ind  Long term goal 4: Pt will complete LB dressing Ind  Long term goal 5: Pt will doff/don footwear Ind  Long term goals 6: Pt will complete toileting mod I  Long term goal 7: Pt will complete functional transfers (bed, chair, toilet, shower) c DME PRN and mod I  Long term goal 8: Pt will perform therex/therax to facilitate increased strength/endurance/ax tolerance (c emphasis on dynamic standing balance/tolerance >8 mins, BUE endurance) c SBA  Long term goal 9: Pt will complete simple homemaking tasks c DME PRN and mod I :                                       Eating: Eating  Assistance Needed: Independent  Comment: X  CARE Score: 6  Discharge Goal: Independent       Oral Hygiene: Oral Hygiene  Assistance Needed: Independent  Comment: X  CARE Score: 6  Discharge Goal: Independent    UB/LB Bathing: Shower/Bathe Self  Assistance Needed: Supervision or touching assistance  Comment: SBA uses reacher with washcloth to wash distal B LE, introduce LHS next ADL  CARE Score: 4  Discharge Goal: Independent    UB Dressing: Upper Body Dressing  Assistance Needed: Independent  Comment: X  CARE Score: 6  Discharge Goal: Independent         LB Dressing: Lower Body Dressing  Assistance Needed: Supervision or touching assistance  Comment: SBA uses reacher to thread B LE  CARE Score: 4  Discharge Goal: Independent    Donning and [place] Footwear: Putting On/Taking Off Footwear  Assistance Needed: Independent  Comment: using sock aide  CARE Score: 6  Discharge Goal: Independent      Toileting: Toileting Hygiene  Assistance Needed: Supervision or touching assistance  Comment: sba  CARE Score: 4  Discharge Goal: Independent      Toilet Transfers:   Toilet Transfer  Assistance Needed: Supervision or touching assistance  Comment: SBA  CARE Score: 4  Discharge Goal: Independent    Physical Therapy:         Long term goals  Time Frame for Long term goals : 7days  Long term goal 1: Pt will perform all bed mobility with independence  Long term goal 2: Pt will perform sit to stand, pivot and car transfers  with mod i  Long term goal 3: Pt will ambulate 150  feet on level surfaces and 10' on unlevel surfaces with mod I  using 4ww  Long term goal 4: Pt will ascend/descend curb step with   4ww   and up to 4    steps with  rail(s) with    mod I  Long term goal 5: Pt will retrieve light item from floor with mod i  using 4ww and reacher      Bed Mobility:   Sit to Lying  Assistance Needed: Supervision or touching assistance  Comment: SBA-Sup without use of bed features (leg bag in place)  CARE Score: 4  Discharge Goal: Independent  Roll Left and Right  Assistance Needed: Supervision or touching assistance  Comment: SBA-Sup without use of bed features; leg bag in place  CARE Score: 4  Discharge Goal: Independent  Lying to Sitting on Side of Bed  Assistance Needed: Supervision or touching assistance  Comment: SBA-Sup without use of bed features; leg bag in place  CARE Score: 4  Discharge Goal: Independent    Transfers:    Sit to Stand  Assistance Needed: Supervision or touching assistance  Comment: SBA-Sup using rollator and with leg bag in place  CARE Score: 4  Discharge Goal: Independent  Chair/Bed-to-Chair Transfer  Assistance Needed: Supervision or touching assistance  Comment: SBA-Sup using rollator and with leg bag in place  CARE Score: 4  Discharge Goal: Independent     Car Transfer  Assistance Needed: Supervision or touching assistance  Comment: SBA using rollator, leg bag in place  CARE Score: 4  Discharge Goal: Independent    Ambulation:    Walking Ability  Does the Patient Walk?: Yes     Walk 10 Feet  Assistance Needed: Supervision or touching assistance  Comment: SBA using rollator  CARE Score: 4  Discharge Goal: Independent     Walk 50 Feet with Two Turns  Assistance Needed: Supervision or touching assistance  Comment: SBA using rollator  CARE Score: 4  Discharge Goal: Independent     Walk 150 Feet  Assistance Needed: Supervision or touching assistance  Comment: SBA using RW today (pt required activity pacing technique and encouragement to complete this distance today)  Reason if not Attempted: Not attempted due to medical condition or safety concerns  CARE Score: 4  Discharge Goal: Independent     Walking 10 Feet on Uneven Surfaces  Assistance Needed: Supervision or touching assistance  Comment: SBA-CGA using rollator and with leg bag in place  CARE Score: 4  Discharge Goal: Independent     1 Step (Curb)  Assistance Needed: Supervision or touching assistance  Comment: CGA using rollator and with leg bag in place  CARE Score: 4  Discharge Goal: Independent     4 Steps  Assistance Needed: Supervision or touching assistance  Comment: SBA using railings and with leg bag in place  CARE Score: 4  Discharge Goal: Independent     12 Steps  Comment: Pt states he could not complete full flight without resting PTA  Reason if not Attempted: Not applicable  CARE Score: 9  Discharge Goal: Not Applicable       Wheelchair:  w/c Ability: Wheelchair Ability  Uses a Wheelchair and/or Scooter?: No                Balance:        Object: Picking Up Object  Assistance Needed: Supervision or touching assistance  Comment: supervision with 2ww  CARE Score: 4  Discharge Goal: Independent    I      Exam:    Blood pressure (!) 138/58, pulse 65, temperature 98.1 °F (36.7 °C), temperature source Oral, resp. rate 18, height 5' 8\" (1.727 m), weight 195 lb 8.8 oz (88.7 kg), SpO2 97 %. General: Semiupright in bed. Alert and talkative. In fair spirits. HEENT: MMM. Clear speech. Pulmonary: No labored breathing or coughing. Cardiac: RRR. Abdomen: Patient's abdomen is soft and nondistended. Bowel sounds were present throughout. There was no rebound, guarding or masses noted. Suprapubic catheter without erythema or drainage or tenderness. Upper extremities: Able to bring both hands together in the midline. Normal tone. No reflexes. Lower extremities: Calves soft. Heels clear. Sitting balance was good. Standing balance was fair-.    Lab Results   Component Value Date    WBC 6.5 11/18/2021    HGB 9.6 (L) 11/18/2021    HCT 31.4 (L) 11/18/2021    MCV 94.0 11/18/2021     11/18/2021     Lab Results   Component Value Date    INR 0.99 05/19/2015    INR 1.31 03/23/2013    INR 1.37 01/24/2011    PROTIME 11.3 05/19/2015    PROTIME 14.2 03/23/2013    PROTIME 15.2 (H) 01/24/2011     Lab Results   Component Value Date    CREATININE 1.7 (H) 11/19/2021    BUN 27 (H) 11/19/2021     11/19/2021    K 4.6 11/19/2021     11/19/2021    CO2 22 11/19/2021     Lab Results   Component Value Date    ALT 16 11/17/2021    AST 18 11/17/2021    ALKPHOS 98 11/17/2021    BILITOT 0.3 11/17/2021       Expected length of stay  prior to a supervised level of function for discharge home with a walker and [address] OT/PT is 11/27/2021. Recommendations:    1.  Urinary tract infection with gait disturbance and frequent falls:  Demonstrating benefit from participating in the daily occupational and physical therapy. IV antibiotics for his urinary tract infection. (New midline in the right upper limb). Ongoing generalized strengthening and range of motion recovery exercises. Adaptive equipment training. Pulmonary hygiene measures. Nutritional support. Bowel retraining progressing. Maximizing blood pressure and blood sugar control. DVT prophylaxis. Verbal cues and minimum physical assist for transfers. 2. DVT prophylaxis: Lovenox 40 mg subcu daily. I must monitor his hemoglobin and platelet count periodically while on this medication. Weightbearing activities are steadily improving daily. GI prophylaxis offered. No new bruising or swelling. 3. 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 He is on Lantus nightly and Humalog sliding scale. He is receiving oral Nesina and has his blood sugars checked at mealtime and bedtime. Blood sugars generally less than 170.  4. Chronic kidney disease with recent exacerbation: Avoiding nephrotoxic medications. Avoiding great fluctuations of blood pressure. Hydralazine. Periodic monitoring of his chemistries. Encouraging oral hydration. 5. Hypertension: Norvasc, Apresoline and Normodyne with lisinopril. Target systolic blood pressure is 120-140. Vital signs are checked at rest and with activity. Blood pressure within target range again today. 6. Prostate cancer with suprapubic catheter: Outpatient follow-up with urology to develop a program for either every 3-week catheter replacements or weekly bladder flushing. Counseling and Coordination of Care: In care conference today I met with the patient's OT, PT, RN and . We discussed the patient's problems, progress and prognosis. Disposition issues were clarified and plans were established for ongoing rehabilitation efforts beyond the ARU stay.  I reviewed this information with the patient during a second distinct visit with the patient. More than half of the total time of 32 minutes spent with the patient involved counseling and coordination of care.

## 2021-11-24 NOTE — PLAN OF CARE
Problem: Falls - Risk of:  Goal: Will remain free from falls  Description: Will remain free from falls  11/24/2021 1021 by Jonny Bustamante RN  Outcome: Ongoing  11/23/2021 2310 by Francoise Lainez LPN  Outcome: Ongoing  Goal: Absence of physical injury  Description: Absence of physical injury  11/24/2021 1021 by Jonny Bustamante RN  Outcome: Ongoing  11/23/2021 2310 by Francoise Lainez LPN  Outcome: Ongoing     Problem: Skin Integrity:  Goal: Will show no infection signs and symptoms  Description: Will show no infection signs and symptoms  11/24/2021 1021 by Jonny Bustamante RN  Outcome: Ongoing  11/23/2021 2310 by Francoise Lainez LPN  Outcome: Ongoing  Goal: Absence of new skin breakdown  Description: Absence of new skin breakdown  11/24/2021 1021 by Jonny Bustamante RN  Outcome: Ongoing  11/23/2021 2310 by Francoise Lainez LPN  Outcome: Ongoing     Problem: Infection:  Goal: Will remain free from infection  Description: Will remain free from infection  11/24/2021 1021 by Jonny Bustamante RN  Outcome: Ongoing  11/23/2021 2310 by Francoise Lainez LPN  Outcome: Ongoing     Problem: Safety:  Goal: Free from accidental physical injury  Description: Free from accidental physical injury  11/24/2021 1021 by Jonny Bustamante RN  Outcome: Ongoing  11/23/2021 2310 by Francoise Lainez LPN  Outcome: Ongoing  Goal: Free from intentional harm  Description: Free from intentional harm  11/24/2021 1021 by Jonny Bustamante RN  Outcome: Ongoing  11/23/2021 2310 by Francoise Lainez LPN  Outcome: Ongoing     Problem: Daily Care:  Goal: Daily care needs are met  Description: Daily care needs are met  11/24/2021 1021 by Jonny Bustamante RN  Outcome: Ongoing  11/23/2021 2310 by Francoise Lainez LPN  Outcome: Ongoing     Problem: Pain:  Goal: Patient's pain/discomfort is manageable  Description: Patient's pain/discomfort is manageable  11/24/2021 1021 by Jonny Bustamante RN  Outcome: Ongoing  11/23/2021 2310 by Francoise Shiloh, LPN  Outcome: Ongoing     Problem: Skin Integrity:  Goal: Skin integrity will stabilize  Description: Skin integrity will stabilize  11/24/2021 1021 by Nella Almonte RN  Outcome: Ongoing  11/23/2021 2310 by Shell Javier LPN  Outcome: Ongoing     Problem: Discharge Planning:  Goal: Patients continuum of care needs are met  Description: Patients continuum of care needs are met  11/24/2021 1021 by Nella Almonte RN  Outcome: Ongoing  11/23/2021 2310 by Shell Javier LPN  Outcome: Ongoing

## 2021-11-24 NOTE — PROGRESS NOTES
Comprehensive Nutrition Assessment    Type and Reason for Visit:  Reassess    Nutrition Recommendations/Plan:   Continue current diet and supplement regimen as ordered  Will continue to monitor po intake, nutrition status, poc    Nutrition Assessment:  Pt currently on 5 carb choice diet with varied po intake (%), pt unavailable at time of visit attempt, will continue to follow at moderate nutrition    Malnutrition Assessment:  Malnutrition Status:  Insufficient data    Context:  Acute Illness       Estimated Daily Nutrient Needs:  Energy (kcal):  9963-8722 (Frosty Pert); Weight Used for Energy Requirements:  Current     Protein (g):  ~70-84 (1-1.2 g/kg IBW); Weight Used for Protein Requirements:  Ideal        Fluid (ml/day):  ~1800; Method Used for Fluid Requirements:  1 ml/kcal      Nutrition Related Findings:  POC Glucose 156, 206      Wounds:  None       Current Nutrition Therapies:    ADULT DIET; Regular; 5 carb choices (75 gm/meal)    Anthropometric Measures:  · Height: 5' 7.99\" (172.7 cm)  · Current Body Weight: 201 lb 1 oz (91.2 kg)   · Admission Body Weight: 197 lb 12 oz (89.7 kg)    · Usual Body Weight: 198 lb (89.8 kg) (2/22/21)     · Ideal Body Weight: 154 lbs; % Ideal Body Weight 130.6 %   · BMI: 30.6  · BMI Categories: Obese Class 1 (BMI 30.0-34. 9)       Nutrition Diagnosis:   · Altered nutrition-related lab values related to food and nutrition related knowledge deficit as evidenced by lab values    Nutrition Interventions:   Food and/or Nutrient Delivery:  Continue Current Diet, Start Oral Nutrition Supplement  Nutrition Education/Counseling:  No recommendation at this time   Coordination of Nutrition Care:  Continue to monitor while inpatient    Goals:  Pt will consume at least 75% of meals with better glucose control       Nutrition Monitoring and Evaluation:   Behavioral-Environmental Outcomes:  None Identified   Food/Nutrient Intake Outcomes:  Food and Nutrient Intake, Supplement Intake  Physical Signs/Symptoms Outcomes:  Biochemical Data, GI Status, Hemodynamic Status, Fluid Status or Edema, Weight     Discharge Planning:     Too soon to determine     Electronically signed by Esequiel Castro MS, RD, LD on 11/24/21 at 1:32 PM EST    Contact: [phone]

## 2021-11-25 LAB
GLUCOSE BLD-MCNC: 190 MG/DL (ref 70–99)
GLUCOSE BLD-MCNC: 201 MG/DL (ref 70–99)
GLUCOSE BLD-MCNC: 206 MG/DL (ref 70–99)
GLUCOSE BLD-MCNC: 227 MG/DL (ref 70–99)

## 2021-11-25 PROCEDURE — 94150 VITAL CAPACITY TEST: CPT

## 2021-11-25 PROCEDURE — 6370000000 HC RX 637 (ALT 250 FOR IP): Performed by: NURSE PRACTITIONER

## 2021-11-25 PROCEDURE — 6360000002 HC RX W HCPCS: Performed by: NURSE PRACTITIONER

## 2021-11-25 PROCEDURE — 6360000002 HC RX W HCPCS: Performed by: PHYSICAL MEDICINE & REHABILITATION

## 2021-11-25 PROCEDURE — 94761 N-INVAS EAR/PLS OXIMETRY MLT: CPT

## 2021-11-25 PROCEDURE — 6370000000 HC RX 637 (ALT 250 FOR IP): Performed by: PHYSICAL MEDICINE & REHABILITATION

## 2021-11-25 PROCEDURE — 82962 GLUCOSE BLOOD TEST: CPT

## 2021-11-25 PROCEDURE — 1280000000 HC REHAB R&B

## 2021-11-25 PROCEDURE — 2580000003 HC RX 258: Performed by: NURSE PRACTITIONER

## 2021-11-25 RX ADMIN — OXYBUTYNIN CHLORIDE 5 MG: 5 TABLET, FILM COATED, EXTENDED RELEASE ORAL at 21:21

## 2021-11-25 RX ADMIN — ACETAMINOPHEN 650 MG: 325 TABLET ORAL at 06:30

## 2021-11-25 RX ADMIN — HYDRALAZINE HYDROCHLORIDE 25 MG: 25 TABLET ORAL at 06:30

## 2021-11-25 RX ADMIN — INSULIN LISPRO 2 UNITS: 100 INJECTION, SOLUTION INTRAVENOUS; SUBCUTANEOUS at 08:54

## 2021-11-25 RX ADMIN — INSULIN GLARGINE 10 UNITS: 100 INJECTION, SOLUTION SUBCUTANEOUS at 21:43

## 2021-11-25 RX ADMIN — HYDRALAZINE HYDROCHLORIDE 25 MG: 25 TABLET ORAL at 21:21

## 2021-11-25 RX ADMIN — MEROPENEM 1000 MG: 1 INJECTION INTRAVENOUS at 21:42

## 2021-11-25 RX ADMIN — INSULIN LISPRO 2 UNITS: 100 INJECTION, SOLUTION INTRAVENOUS; SUBCUTANEOUS at 17:06

## 2021-11-25 RX ADMIN — MEROPENEM 1000 MG: 1 INJECTION INTRAVENOUS at 10:33

## 2021-11-25 RX ADMIN — SODIUM CHLORIDE, PRESERVATIVE FREE 10 ML: 5 INJECTION INTRAVENOUS at 22:21

## 2021-11-25 RX ADMIN — HYDRALAZINE HYDROCHLORIDE 25 MG: 25 TABLET ORAL at 15:07

## 2021-11-25 RX ADMIN — AMLODIPINE BESYLATE 10 MG: 10 TABLET ORAL at 08:51

## 2021-11-25 RX ADMIN — POLYETHYLENE GLYCOL (3350) 17 G: 17 POWDER, FOR SOLUTION ORAL at 08:49

## 2021-11-25 RX ADMIN — LISINOPRIL 5 MG: 5 TABLET ORAL at 10:34

## 2021-11-25 RX ADMIN — LABETALOL HYDROCHLORIDE 200 MG: 100 TABLET, FILM COATED ORAL at 21:21

## 2021-11-25 RX ADMIN — LABETALOL HYDROCHLORIDE 200 MG: 100 TABLET, FILM COATED ORAL at 08:51

## 2021-11-25 RX ADMIN — ALOGLIPTIN 6.25 MG: 12.5 TABLET, FILM COATED ORAL at 08:50

## 2021-11-25 RX ADMIN — ENOXAPARIN SODIUM 40 MG: 100 INJECTION SUBCUTANEOUS at 08:49

## 2021-11-25 RX ADMIN — SODIUM CHLORIDE, PRESERVATIVE FREE 10 ML: 5 INJECTION INTRAVENOUS at 08:58

## 2021-11-25 ASSESSMENT — PAIN SCALES - GENERAL
PAINLEVEL_OUTOF10: 0
PAINLEVEL_OUTOF10: 0
PAINLEVEL_OUTOF10: 7
PAINLEVEL_OUTOF10: 0
PAINLEVEL_OUTOF10: 0

## 2021-11-25 ASSESSMENT — PAIN SCALES - WONG BAKER
WONGBAKER_NUMERICALRESPONSE: 0

## 2021-11-25 ASSESSMENT — PAIN DESCRIPTION - ORIENTATION: ORIENTATION: LEFT

## 2021-11-25 ASSESSMENT — PAIN DESCRIPTION - FREQUENCY: FREQUENCY: CONTINUOUS

## 2021-11-25 ASSESSMENT — PAIN DESCRIPTION - PAIN TYPE: TYPE: CHRONIC PAIN

## 2021-11-25 ASSESSMENT — PAIN DESCRIPTION - DESCRIPTORS: DESCRIPTORS: DISCOMFORT

## 2021-11-25 ASSESSMENT — PAIN DESCRIPTION - LOCATION: LOCATION: ANKLE;LEG;GENERALIZED

## 2021-11-25 ASSESSMENT — PAIN DESCRIPTION - ONSET: ONSET: ON-GOING

## 2021-11-25 NOTE — PROGRESS NOTES
Monica Hull    : 1940  Acct #: [de-identified]  MRN: [id number]              PM&R Progress Note      Admitting diagnosis: Urinary tract infection ( [address])     Comorbid diagnoses impacting rehabilitation: Generalized weakness, gait disturbance, chronic kidney disease stage IIIa, essential hypertension, frequent falls, uncontrolled diabetes type 2 with peripheral neuropathy, status post prostate cancer with indwelling suprapubic catheter    Chief complaint: He feels stronger and is looking forward to discharge this weekend. No nausea with antibiotics. Prior (baseline) level of function: Independent.     Current level of function:         Current  IRF-LESTER and Goals:   Occupational Therapy:    Short term goals  Time Frame for Short term goals: STGs=LTGs :   Long term goals  Time Frame for Long term goals : 7-10 days or until d/c  Long term goal 1: Pt will complete grooming tasks Ind  Long term goal 2: Pt will complete total body bathing mod I  Long term goal 3: Pt will complete UB dressing Ind  Long term goal 4: Pt will complete LB dressing Ind  Long term goal 5: Pt will doff/don footwear Ind  Long term goals 6: Pt will complete toileting mod I  Long term goal 7: Pt will complete functional transfers (bed, chair, toilet, shower) c DME PRN and mod I  Long term goal 8: Pt will perform therex/therax to facilitate increased strength/endurance/ax tolerance (c emphasis on dynamic standing balance/tolerance >8 mins, BUE endurance) c SBA  Long term goal 9: Pt will complete simple homemaking tasks c DME PRN and mod I :                                       Eating: Eating  Assistance Needed: Independent  Comment: X  CARE Score: 6  Discharge Goal: Independent       Oral Hygiene: Oral Hygiene  Assistance Needed: Independent  Comment: X  CARE Score: 6  Discharge Goal: Independent    UB/LB Bathing: Shower/Bathe Self  Assistance Needed: Supervision or touching assistance  Comment: Sup  CARE Score: 4  Discharge Goal: Independent    UB Dressing: Upper Body Dressing  Assistance Needed: Independent  Comment: X  CARE Score: 6  Discharge Goal: Independent         LB Dressing: Lower Body Dressing  Assistance Needed: Supervision or touching assistance  Comment: Sup uses reacher to thread feet through pants, manages catheter changing from overnight to leg bag  CARE Score: 4  Discharge Goal: Independent    Donning and [place] Footwear: Putting On/Taking Off Footwear  Assistance Needed: Independent  Comment: Mod I using sock aide  CARE Score: 6  Discharge Goal: Independent      Toileting: Toileting Hygiene  Assistance Needed: Supervision or touching assistance  Comment: Sup  CARE Score: 4  Discharge Goal: Independent      Toilet Transfers:   Toilet Transfer  Assistance Needed: Supervision or touching assistance  Comment: Sup  CARE Score: 4  Discharge Goal: Independent    Physical Therapy:         Long term goals  Time Frame for Long term goals : 7days  Long term goal 1: Pt will perform all bed mobility with independence  Long term goal 2: Pt will perform sit to stand, pivot and car transfers  with mod i  Long term goal 3: Pt will ambulate 150  feet on level surfaces and 10' on unlevel surfaces with mod I  using 4ww  Long term goal 4: Pt will ascend/descend curb step with   4ww   and up to 4    steps with  rail(s) with    mod I  Long term goal 5: Pt will retrieve light item from floor with mod i  using 4ww and reacher      Bed Mobility:   Sit to Lying  Assistance Needed: Supervision or touching assistance  Comment: SBA-Sup without use of bed features (leg bag in place)  CARE Score: 4  Discharge Goal: Independent  Roll Left and Right  Assistance Needed: Supervision or touching assistance  Comment: SBA-Sup without use of bed features; leg bag in place  CARE Score: 4  Discharge Goal: Independent  Lying to Sitting on Side of Bed  Assistance Needed: Supervision or touching assistance  Comment: SBA-Sup without use of bed features; leg bag in place  CARE Score: 4  Discharge Goal: Independent    Transfers:    Sit to Stand  Assistance Needed: Supervision or touching assistance  Comment: SBA-Sup using rollator and with leg bag in place  CARE Score: 4  Discharge Goal: Independent  Chair/Bed-to-Chair Transfer  Assistance Needed: Supervision or touching assistance  Comment: SBA-Sup using rollator and with leg bag in place  CARE Score: 4  Discharge Goal: Independent     Car Transfer  Assistance Needed: Supervision or touching assistance  Comment: SBA using rollator, leg bag in place  CARE Score: 4  Discharge Goal: Independent    Ambulation:    Walking Ability  Does the Patient Walk?: Yes     Walk 10 Feet  Assistance Needed: Supervision or touching assistance  Comment: SBA using rollator  CARE Score: 4  Discharge Goal: Independent     Walk 50 Feet with Two Turns  Assistance Needed: Supervision or touching assistance  Comment: SBA using rollator  CARE Score: 4  Discharge Goal: Independent     Walk 150 Feet  Assistance Needed: Supervision or touching assistance  Comment: SBA using RW today (pt required activity pacing technique and encouragement to complete this distance today)  Reason if not Attempted: Not attempted due to medical condition or safety concerns  CARE Score: 4  Discharge Goal: Independent     Walking 10 Feet on Uneven Surfaces  Assistance Needed: Supervision or touching assistance  Comment: SBA-CGA using rollator and with leg bag in place  CARE Score: 4  Discharge Goal: Independent     1 Step (Curb)  Assistance Needed: Supervision or touching assistance  Comment: CGA using rollator and with leg bag in place  CARE Score: 4  Discharge Goal: Independent     4 Steps  Assistance Needed: Supervision or touching assistance  Comment: SBA using railings and with leg bag in place  CARE Score: 4  Discharge Goal: Independent     12 Steps  Comment: Pt states he could not complete full flight without resting PTA  Reason if not Attempted: Not applicable  CARE Score: 9  Discharge Goal: Not Applicable       Wheelchair:  w/c Ability: Wheelchair Ability  Uses a Wheelchair and/or Scooter?: No                Balance:        Object: Picking Up Object  Assistance Needed: Supervision or touching assistance  Comment: supervision with 2ww  CARE Score: 4  Discharge Goal: Independent    I      Exam:    Blood pressure (!) 126/52, pulse 59, temperature 98 °F (36.7 °C), temperature source Oral, resp. rate 16, height 5' 7.99\" (1.727 m), weight 201 lb 1 oz (91.2 kg), SpO2 98 %. General: Up in wheelchair. Moving it some with his legs. Alert. HEENT: Gazing right and left. Strong speech. Pulmonary: Unlabored without wheezes or rales. Cardiac: RRR. Abdomen: Patient's abdomen is soft and nondistended. Bowel sounds were present throughout. There was no rebound, guarding or masses noted. Dry suprapubic site with minimal erythema. Upper extremities: Strong . Fair dexterity. 4+/5 strength throughout. Lower extremities: 4/5 strength throughout. No signs of DVT. Sitting balance was good. Standing balance was fair-.    Lab Results   Component Value Date    WBC 6.5 11/18/2021    HGB 9.6 (L) 11/18/2021    HCT 31.4 (L) 11/18/2021    MCV 94.0 11/18/2021     11/18/2021     Lab Results   Component Value Date    INR 0.99 05/19/2015    INR 1.31 03/23/2013    INR 1.37 01/24/2011    PROTIME 11.3 05/19/2015    PROTIME 14.2 03/23/2013    PROTIME 15.2 (H) 01/24/2011     Lab Results   Component Value Date    CREATININE 1.7 (H) 11/19/2021    BUN 27 (H) 11/19/2021     11/19/2021    K 4.6 11/19/2021     11/19/2021    CO2 22 11/19/2021     Lab Results   Component Value Date    ALT 16 11/17/2021    AST 18 11/17/2021    ALKPHOS 98 11/17/2021    BILITOT 0.3 11/17/2021       Expected length of stay  prior to a supervised level of function for discharge home with a walker and [facility] OT/PT is 11/28/2021. Recommendations:    1.  Urinary tract infection with gait disturbance and frequent falls:   We will transition to home care therapies after his discharge this Sunday. He must receive IV meropenem through 11/28/2021 for his ESBL UTI. Continues to engage well in his daily occupational and physical therapy. Completed adaptive equipment training. Benefiting from pulmonary hygiene measures. Eating well and is continent of bowel. Maximizing blood pressure and blood sugar control. DVT prophylaxis.  Verbal cues and  CGA for transfers. 2. DVT prophylaxis: Lovenox 40 mg subcu daily. I must monitor his hemoglobin and platelet count periodically while on this medication. Weightbearing activities are steadily improving daily. GI prophylaxis offered.  No clinical signs of blood loss. 3. 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 He is on Lantus nightly and Humalog sliding scale. He is receiving oral Nesina and has his blood sugars checked at mealtime and bedtime. Blood sugars generally less than 150.  4. Chronic kidney disease with recent exacerbation: Avoiding nephrotoxic medications. Avoiding great fluctuations of blood pressure. Hydralazine. Periodic monitoring of his chemistries. Encouraging oral hydration.  Creatinine holding at 1.7.  5. Hypertension: Norvasc, Apresoline and Normodyne with lisinopril. Target systolic blood pressure is 120-140. Vital signs are checked at rest and with activity.  Blood pressure within target range again today.   6. Prostate cancer with suprapubic catheter: Outpatient follow-up with urology to develop a program for either every 3-week catheter replacements or weekly bladder flushing.

## 2021-11-26 LAB
GLUCOSE BLD-MCNC: 157 MG/DL (ref 70–99)
GLUCOSE BLD-MCNC: 161 MG/DL (ref 70–99)
GLUCOSE BLD-MCNC: 221 MG/DL (ref 70–99)
GLUCOSE BLD-MCNC: 93 MG/DL (ref 70–99)

## 2021-11-26 PROCEDURE — 6370000000 HC RX 637 (ALT 250 FOR IP): Performed by: NURSE PRACTITIONER

## 2021-11-26 PROCEDURE — 97150 GROUP THERAPEUTIC PROCEDURES: CPT

## 2021-11-26 PROCEDURE — 6360000002 HC RX W HCPCS: Performed by: PHYSICAL MEDICINE & REHABILITATION

## 2021-11-26 PROCEDURE — 6360000002 HC RX W HCPCS: Performed by: NURSE PRACTITIONER

## 2021-11-26 PROCEDURE — 99232 SBSQ HOSP IP/OBS MODERATE 35: CPT | Performed by: PHYSICAL MEDICINE & REHABILITATION

## 2021-11-26 PROCEDURE — 94150 VITAL CAPACITY TEST: CPT

## 2021-11-26 PROCEDURE — 2580000003 HC RX 258: Performed by: NURSE PRACTITIONER

## 2021-11-26 PROCEDURE — 6370000000 HC RX 637 (ALT 250 FOR IP): Performed by: PHYSICAL MEDICINE & REHABILITATION

## 2021-11-26 PROCEDURE — 97530 THERAPEUTIC ACTIVITIES: CPT

## 2021-11-26 PROCEDURE — 97116 GAIT TRAINING THERAPY: CPT

## 2021-11-26 PROCEDURE — 82962 GLUCOSE BLOOD TEST: CPT

## 2021-11-26 PROCEDURE — 94761 N-INVAS EAR/PLS OXIMETRY MLT: CPT

## 2021-11-26 PROCEDURE — 97535 SELF CARE MNGMENT TRAINING: CPT

## 2021-11-26 PROCEDURE — 1280000000 HC REHAB R&B

## 2021-11-26 RX ORDER — HYDRALAZINE HYDROCHLORIDE 25 MG/1
25 TABLET, FILM COATED ORAL EVERY 8 HOURS SCHEDULED
Qty: 90 TABLET | Refills: 0 | Status: SHIPPED | OUTPATIENT
Start: 2021-11-26 | End: 2022-03-17

## 2021-11-26 RX ORDER — LABETALOL 200 MG/1
200 TABLET, FILM COATED ORAL EVERY 12 HOURS SCHEDULED
Qty: 60 TABLET | Refills: 0 | Status: SHIPPED | OUTPATIENT
Start: 2021-11-26 | End: 2022-03-17

## 2021-11-26 RX ORDER — AMLODIPINE BESYLATE 10 MG/1
10 TABLET ORAL DAILY
Qty: 30 TABLET | Refills: 0 | Status: ON HOLD | OUTPATIENT
Start: 2021-11-27 | End: 2022-04-14 | Stop reason: HOSPADM

## 2021-11-26 RX ADMIN — ENOXAPARIN SODIUM 40 MG: 100 INJECTION SUBCUTANEOUS at 10:47

## 2021-11-26 RX ADMIN — AMLODIPINE BESYLATE 10 MG: 10 TABLET ORAL at 10:47

## 2021-11-26 RX ADMIN — INSULIN LISPRO 1 UNITS: 100 INJECTION, SOLUTION INTRAVENOUS; SUBCUTANEOUS at 12:44

## 2021-11-26 RX ADMIN — LABETALOL HYDROCHLORIDE 200 MG: 100 TABLET, FILM COATED ORAL at 21:10

## 2021-11-26 RX ADMIN — LABETALOL HYDROCHLORIDE 200 MG: 100 TABLET, FILM COATED ORAL at 10:47

## 2021-11-26 RX ADMIN — INSULIN LISPRO 2 UNITS: 100 INJECTION, SOLUTION INTRAVENOUS; SUBCUTANEOUS at 16:43

## 2021-11-26 RX ADMIN — HYDRALAZINE HYDROCHLORIDE 25 MG: 25 TABLET ORAL at 05:58

## 2021-11-26 RX ADMIN — SODIUM CHLORIDE, PRESERVATIVE FREE 10 ML: 5 INJECTION INTRAVENOUS at 21:09

## 2021-11-26 RX ADMIN — MEROPENEM 1000 MG: 1 INJECTION INTRAVENOUS at 10:53

## 2021-11-26 RX ADMIN — OXYBUTYNIN CHLORIDE 5 MG: 5 TABLET, FILM COATED, EXTENDED RELEASE ORAL at 21:10

## 2021-11-26 RX ADMIN — LISINOPRIL 5 MG: 5 TABLET ORAL at 10:46

## 2021-11-26 RX ADMIN — INSULIN GLARGINE 10 UNITS: 100 INJECTION, SOLUTION SUBCUTANEOUS at 21:22

## 2021-11-26 RX ADMIN — HYDRALAZINE HYDROCHLORIDE 25 MG: 25 TABLET ORAL at 21:10

## 2021-11-26 RX ADMIN — HYDRALAZINE HYDROCHLORIDE 25 MG: 25 TABLET ORAL at 16:36

## 2021-11-26 RX ADMIN — MEROPENEM 1000 MG: 1 INJECTION INTRAVENOUS at 21:18

## 2021-11-26 RX ADMIN — ALOGLIPTIN 6.25 MG: 12.5 TABLET, FILM COATED ORAL at 10:46

## 2021-11-26 RX ADMIN — SODIUM CHLORIDE, PRESERVATIVE FREE 10 ML: 5 INJECTION INTRAVENOUS at 10:55

## 2021-11-26 ASSESSMENT — PAIN SCALES - GENERAL
PAINLEVEL_OUTOF10: 0

## 2021-11-26 NOTE — CARE COORDINATION
LSW called patient son Clarissa Melo ([phone]) to update on D/C date becoming the 28th instead of the 27th. Jon verbalized understanding. Clarissa Melo will provide transport home for patient and would like Nursing to call when patient is ready. No other needs expressed at this time. 10:00 AM  LSW attempted to set hospital follow-up with patient PCP, Dr. Humberto Marin ([phone]), but office is closed for Thanksgiving holiday and unable to leave message. LSW then attempted to contact [facility] ([phone]) to set follow-up. Office is closed for holiday as well and unable to leave message. Information added to patient AVS to contact offices to set hospital and Urology  follow-up. 10:50 AM  HHC referral for PT and OT sent to [facility] via fax to [phone].    3:30 PM  Patient follow-ups set. No new DME recommended. HHC order sent to [facility]  for PT and OT. Caregiver training was not recommended prior to discharge. Patient to discharge to home with jennifer Melo. Patient's son to provide transport home and Nursing is aware. Patient is set for discharge from case management standpoint.

## 2021-11-26 NOTE — PROGRESS NOTES
Physical Therapy    [x] daily progress note       [x] discharge       Patient Name:  Kimberly Herbert   :  1940 MRN: [id number]  Room:  [address] [place] [address] Date of Admission: 2021  Rehabilitation Diagnosis:   Urinary tract infection, site not specified [N39.0]  UTI (urinary tract infection) [N39.0]       Date 2021       Day of ARU Week:  2   Time IN/OUT 1450/1607   Individual Tx Minutes 68   Group Tx Minutes    Co-Treat Minutes    Concurrent Tx Minutes    TOTAL Tx Time Mins 68   Variance Time +17   Variance Time []   Refusal due to:     []   Medical hold/reason:    []   Illness   []   Off Unit for test/procedure  [x]   Extra time needed to complete tasks  []   Therapeutic need  []   Other (specify):   Restrictions Restrictions/Precautions  Restrictions/Precautions: Fall Risk, Contact Precautions (suprapubic catheter with ESBL)      Interdisciplinary communication [x]   Cleared for therapy per nursing     []   RN notified about issues during session  []   RN updated on pt performance  []   Spoke with   []   Spoke with OT  []   Spoke with MD  []   Other:    Subjective observations and cognitive status: Pt resting in w/c, alert and cooperative, aware of discharge plan this coming  to finish all doses of IV antibiotics.       Pain level/location: 0/10       Location:    Discharge recommendations  Anticipated discharge date:  2021  Destination: []home alone   []home alone with assist PRN     [x] home w/ family      [] Continuous supervision  []SNF    [] Assisted living     [] Other:  Continued therapy: [x]HHC PT  []OUTPATIENT PT   [] No Further PT  []SNF PT  Caregiver training recommended: []Yes  [] No   Equipment needs: has rollator      PT IRF-LESTER scores and goals for discharge assessment:   Roll Left and Right  Assistance Needed: Independent  Comment: Ind without use of bed features, leg bag in place  CARE Score: 6  Discharge Goal: Independent    Sit to Lying  Assistance Needed: Independent  Comment: Ind without use of bed features, leg bag in place  CARE Score: 6  Discharge Goal: Independent    Lying to Sitting on Side of Bed  Assistance Needed: Independent  Comment: Ind without use of bed features, leg bag in place  CARE Score: 6  Discharge Goal: Independent    Sit to Stand  Assistance Needed: Independent  Comment: Mod Ind using rollator and with leg bag in place  CARE Score: 6  Discharge Goal: Independent    Chair/Bed-to-Chair Transfer  Assistance Needed: Independent  Comment: Mod Ind using rollator and with leg bag in place  CARE Score: 6  Discharge Goal: Independent    Toilet Transfer  Assistance Needed: Independent  Comment: Mod I  CARE Score: 6  Discharge Goal: Independent    Car Transfer  Assistance Needed: Independent  Comment: Mod Ind using rollator and with leg bag in place  CARE Score: 6  Discharge Goal: Independent    Walk 10 Feet? Walk 10 Feet?: Yes    1 Step  1 Step?: Yes    Picking Up Object  Assistance Needed: Independent  Comment: Mod Ind with rollator and reacher  CARE Score: 6  Discharge Goal: Independent    Wheelchair Ability  Uses a Wheelchair and/or Scooter?: No      Walking Ability  Does the Patient Walk?: Yes    Walk 10 Feet  Assistance Needed: Independent  Comment: Mod Ind using rollator  CARE Score: 6  Discharge Goal: Independent    Walk 50 Feet with Two Turns  Assistance Needed: Independent  Comment: Mod Ind using rollator  CARE Score: 6  Discharge Goal: Independent    Walk 150 Feet  Assistance Needed: Independent  Comment: Mod Ind using rollator  Reason if not Attempted: Not attempted due to medical condition or safety concerns  CARE Score: 6  Discharge Goal: Independent    Walking 10 Feet on Uneven Surfaces  Assistance Needed: Independent  Comment: Mod Ind using rollator  CARE Score: 6  Discharge Goal: Independent    1 Step (Curb)  Assistance Needed: Independent  Comment:  Mod Ind using rollator and with leg bag in place  CARE Score: 6  Discharge Goal: Independent    4 Steps  Assistance Needed: Independent  Comment: Mod Ind using railings with leg bag in place  CARE Score: 6  Discharge Goal: Independent    12 Steps  Comment: max tolerance during entire stay at ARU was 5 steps (limited by fatigue; pt was not performing this task at baseline)  Reason if not Attempted: Not attempted due to medical condition or safety concerns  CARE Score: 88  Discharge Goal: Not Applicable      Patient/Caregiver Education and Training:   []   Role of PT  []   Education about Dx  []   Use of call light for assist   []   HEP provided and explained   []   Treatment plan reviewed  []   Home safety  []   Wheelchair mobility/management   []   Body mechanics  []   Bed Mobility/Transfer technique  []   Gait technique/sequencing  []   Proper use of assistive device/adaptive equipment  []   Stair training/Advanced mobility safety and technique  []   Reinforced patient's precautions/mobility while maintaining precautions  []   Postural awareness  []   Family/caregiver training  []   Progress was updated and reviewed in Rehabtracker with patient and/or family this date. [x]   Other: Pt education about benefits of continued walking regimen at home to prevent deconditioning and as a vital component of continued recovery. Treatment Plan for Next Session: discharge to home on 11/28/2021    Assessment: This pt has met all established PT goals and his current functional mobility status is safe for return to home with assist from family prn.     Treatment/Activity Tolerance:   [] Tolerated treatment with no adverse effects    [x] Patient limited by fatigue  [] Patient limited by pain   [] Patient limited by medical complications:    [] Adverse reaction to Tx:   [] Significant change in status    Safety:       []  bed alarm set    [x]  chair alarm set    []  Pt refused alarms                []  Telesitter activated      [x]  Gait belt used during tx session      []other:       Number of Minutes/Billable Intervention  Gait Training 42   Therapeutic Exercise    Neuro Re-Ed    Therapeutic Activity 35   Wheelchair Propulsion    Group    Other:    TOTAL 77     Social History  Social/Functional History  Lives With:  Morris Keith, home most of day)  Type of Home: House  Home Layout: One level  Home Access: Stairs to enter without rails  Entrance Stairs - Number of Steps: 2 [place]ER[place]  Bathroom Shower/Tub: Tub/Shower unit  Bathroom Toilet: Standard (also has vanity on R to assist with transfer)  Bathroom Equipment: Grab bars in shower, Shower chair, Hand-held shower, Grab bars around toilet  Bathroom Accessibility: Walker accessible  Home Equipment: Cane, Rolling walker, 4 wheeled walker, [address] (also has BSC but doesn't use)  Receives Help From: Home health ([facility] but just finished [facility] OT)  ADL Assistance: Independent  Homemaking Assistance: Independent  Homemaking Responsibilities: Yes (Shares with son)  Laundry Responsibility: Primary  Health Care Management: Primary (has pillbox with alarm)  Ambulation Assistance: Independent (mod I with 4ww, walks short community distances, but uses electric carts in stores)  Transfer Assistance: Independent  Active : No  Patient's  Info: Sons drive. \"They think I'm never gonna drive again, I've got news for them\"  Additional Comments: Pt typically sleeps in a flat, regular bed at home. Pt reports multiple falls (>5) in the past week before hospitalization, but no injuries. Pt's wife recently passed away in September. Objective                                                                                    Goals:  (Update in navigator)   :   Long term goals  Time Frame for Long term goals : 7days  Long term goal 1: Pt will perform all bed mobility with independence  Long term goal 2: Pt will perform sit to stand, pivot and car transfers  with mod i  Long term goal 3: Pt will ambulate 150  feet on level surfaces and 10' on unlevel surfaces with mod I  using 4ww  Long term goal 4: Pt will ascend/descend curb step with   4ww   and up to 4    steps with  rail(s) with    mod I  Long term goal 5: Pt will retrieve light item from floor with mod i  using 4ww and reacher:        Plan of Care                                                                              Times per week: 5 days per week for a minimum of 60 minutes/day plus group as appropriate for 60 minutes.   Treatment to include Current Treatment Recommendations: Strengthening, ROM, Balance Training, Functional Mobility Training, Transfer Training, ADL/Self-care Training, Gait Training, Patient/Caregiver Education & Training, IADL Training, Stair training, Equipment Evaluation, Education, & procurement, Neuromuscular Re-education, Home Exercise Program, Positioning, Endurance Training, Safety Education & Training, Cognitive/Perceptual Training    Electronically signed by   Kellen Mcnamara, PT  11/26/2021, 4:13 PM

## 2021-11-26 NOTE — PROGRESS NOTES
Occupational Therapy    Physical Rehabilitation: OCCUPATIONAL THERAPY     [x] daily progress note       [x] discharge       Patient Name:  Puneet Rodriguez   :  1940 MRN: [id number]  Room:  [address] [place] [address] Date of Admission: 2021  Rehabilitation Diagnosis:   Urinary tract infection, site not specified [N39.0]  UTI (urinary tract infection) [N39.0]       Date 2021       Day of ARU Week:  2   Time IN//845   Individual Tx Minutes 60   Group Tx Minutes 60   Co-Treat Minutes    Concurrent Tx Minutes    TOTAL Tx Time Mins 120   Variance Time    Variance Time []   Refusal due to:     []   Medical hold/reason:    []   Illness   []   Off Unit for test/procedure  []   Extra time needed to complete task  []   Therapeutic need  []   Other (specify):   Restrictions Restrictions/Precautions: Fall Risk, Contact Precautions (suprapubic catheter with ESBL)         Communication with other providers: [x]   OK to see per nursing:     []   Spoke with team member regarding:      Subjective observations and cognitive status: Patient long sitting in bed finishing breakfast meal upon approach, patient pleasant and agreeable to therapy    Pain level/location:    /10       Location: per patient no pain noted    Discharge recommendations  Anticipated discharge date:    Destination: []home alone   []home alone w assist prn   [x] home w/ family    [] Continuous supervision       []SNF    [] Assisted living     [] Other:   Continued therapy: [x]HHC OT  []OUTPATIENT  OT   [] No Further OT  Equipment needs: None        ADLs:    Eating: Eating  Assistance Needed: Independent  Comment: X  CARE Score: 6  Discharge Goal: Independent       Oral Hygiene: Oral Hygiene  Assistance Needed: Independent  Comment: X  CARE Score: 6  Discharge Goal: Independent    UB/LB Bathing: Shower/Bathe Self  Assistance Needed: Independent  Comment: X  CARE Score: 6  Discharge Goal: Independent    UB Dressing: Upper Body Dressing  Assistance Needed: Independent  Comment: X  CARE Score: 6  Discharge Goal: Independent         LB Dressing: Lower Body Dressing  Assistance Needed: Independent  Comment: X  CARE Score: 6  Discharge Goal: Independent    Donning and [place] Footwear: Putting On/Taking Off Footwear  Assistance Needed: Independent  Comment: X  CARE Score: 6  Discharge Goal: Independent      Toileting: Toileting Hygiene  Assistance Needed: Independent  Comment: X  CARE Score: 6  Discharge Goal: Independent      Toilet Transfers: Toilet Transfer  Assistance Needed: Independent  Comment: Mod I  CARE Score: 6  Discharge Goal: Independent  Device Used:    [x]   Standard Toilet         [x]   Grab Bars           []  Bedside Commode       []   Elevated Toilet          []   Other:        Bed Mobility:           []   Pt received out of bed   Rolling R/L:  Mod I   Scooting: Mod I   Supine --> Sit:  Mod I   Sit --> Supine: Mod I     Transfers:    Sit--> Stand: Mod I   Stand --> Sit:   Mod I   Stand-Pivot: Mod I   Other:    Assistive device required for transfer:   RW      Functional Mobility:   Throughout room/bathroom   Assistance: Mod I   Device:   []   Rolling Walker     []   Standard Walker []   Wheelchair        []   [employer]       [x]   4-Wheeled [place]         []   Cardiac Walker       []   Other:        Homemaking Tasks:   Retrieves clothing from closet and transports to bathroom for ADL at Leonardo level         Total time in group = 60min   Exercise / Jonathan Chi Group: Monica Hull participated in exercise and discussion on benefits and precautions during exercise. This provided the opportunity to have fun, build camaraderie, increase endurance, improve breathing techniques, balance, and strength. Monica Hull performed a variety of seated and standing activities as able, with focus on technique and safety during exercise.  Also focused on warm-up, warm-down, endurance monitoring, strengthening & strategies, function, safety, and general social & communication opportunities with other group members. Functional areas targeted:   [] Communication [] Memory  [x] Coordination    [] Kitchen Safety [] Problem Solving  [x] Strengthening     [] Attention  [x] Endurance   [] Mobility    [] Awareness/Insight [] Balance  [] Transfers  [x] Social/Pragmatics [] Sequencing  [] Equipment Use    [x] Safety   [] Other (specify)     Participation:  [x] Actively participated         Additional Therapeutic activities/exercises completed this date:     [x]   ADL Training   [x]   Balance/Postural training     [x]   Bed/Transfer Training   [x]   Endurance Training   []   Neuromuscular Re-ed   []   Nu-step:  Time:        Level:         #Steps:       []   Rebounder:    []  Seated     []  Standing        []   Supine Ther Ex (reps/sets):     []   Seated Ther Ex (reps/sets):     []   Standing Ther Ex (reps/sets):     []   Other:      Comments:      Patient/Caregiver Education and Training:   [x]   [employer] Equipment Use  [x]   Bed Mobility/Transfer Technique/Safety  [x]   Energy Conservation Tips  []   Family training  [x]   Postural Awareness  [x]   Safety During Functional Activities  []   Reinforced Patient's Precautions   []   Progress was updated and reviewed in Rehabtracker with patient and/or family this         date.     Treatment Plan for Next Session: POC to continue as tolerated           Treatment/Activity Tolerance:   [x] Tolerated treatment with no adverse effects    [] Patient limited by fatigue  [] Patient limited by pain   [] Patient limited by medical complications:    [] Adverse reaction to Tx:   [] Significant change in status    Safety:       []  bed alarm set    [x]  chair alarm set    []  Pt refused alarms                []  Telesitter activated      [x]  Gait belt used during tx session      []other:       Number of Minutes/Billable Intervention  Therapeutic Exercise    ADL Self-care 45   Neuro Re-Ed    Therapeutic Activity 15   Group 60   Other:    TOTAL 120       Social History  Social/Functional History  Lives With:  Marbin Dickerson, home most of day)  Type of Home: House  Home Layout: One level  Home Access: Stairs to enter without rails  Entrance Stairs - Number of Steps: 2 GERMAIN  Bathroom Shower/Tub: Tub/Shower unit  Bathroom Toilet: Standard (also has vanity on R to assist with transfer)  Bathroom Equipment: Grab bars in shower, Shower chair, Hand-held shower, Grab bars around toilet  Bathroom Accessibility: Walker accessible  Home Equipment: Cane, Rolling walker, 4 wheeled walker, Iam Ratel (also has BSC but doesn't use)  Receives Help From: [employer] [facility]  ADL Assistance: Independent  Homemaking Assistance: Independent  Homemaking Responsibilities: Yes (Shares with son)  Laundry Responsibility: Primary  Health Care Management: Primary (has pillbox with alarm)  Ambulation Assistance: Independent (mod I with 4ww, walks short community distances, but uses electric carts in stores)  Transfer Assistance: Independent  Active : No  Patient's  Info: Sons drive. \"They think I'm never gonna drive again, I've got news for them\"  Additional Comments: Pt typically sleeps in a flat, regular bed at home. Pt reports multiple falls (>5) in the past week before hospitalization, but no injuries. Pt's wife recently passed away in September.     Objective                                                                                    Goals:  (Update in navigator)  Short term goals  Time Frame for Short term goals: STGs=LTGs:  Long term goals  Time Frame for Long term goals : 7-10 days or until d/c  Long term goal 1: Pt will complete grooming tasks Ind  Long term goal 2: Pt will complete total body bathing mod I  Long term goal 3: Pt will complete UB dressing Ind  Long term goal 4: Pt will complete LB dressing Ind  Long term goal 5: Pt will doff/don footwear Ind  Long term goals 6: Pt will complete toileting mod I  Long term goal 7: Pt will complete functional transfers (bed, chair, toilet, shower) c DME PRN and mod I  Long term goal 8: Pt will perform therex/therax to facilitate increased strength/endurance/ax tolerance (c emphasis on dynamic standing balance/tolerance >8 mins, BUE endurance) c SBA  Long term goal 9: Pt will complete simple homemaking tasks c DME PRN and mod I:        Plan of Care                                                                              Times per week: 5 days per week for a minimum of 60 minutes/day plus group as appropriate for 60 minutes.   Treatment to include Plan  Times per day: Daily  Current Treatment Recommendations: Strengthening, Balance Training, Functional Mobility Training, Endurance Training, Safety Education & Training, Patient/Caregiver Education & Training, Equipment Evaluation, Education, & procurement, Self-Care / ADL, Home Management Training    Electronically signed by   KHARI Mcgowan,  11/26/2021, 8:45 AM

## 2021-11-27 LAB
GLUCOSE BLD-MCNC: 158 MG/DL (ref 70–99)
GLUCOSE BLD-MCNC: 183 MG/DL (ref 70–99)
GLUCOSE BLD-MCNC: 189 MG/DL (ref 70–99)
GLUCOSE BLD-MCNC: 83 MG/DL (ref 70–99)

## 2021-11-27 PROCEDURE — 1280000000 HC REHAB R&B

## 2021-11-27 PROCEDURE — 2580000003 HC RX 258: Performed by: NURSE PRACTITIONER

## 2021-11-27 PROCEDURE — 6370000000 HC RX 637 (ALT 250 FOR IP): Performed by: NURSE PRACTITIONER

## 2021-11-27 PROCEDURE — 82962 GLUCOSE BLOOD TEST: CPT

## 2021-11-27 PROCEDURE — 94761 N-INVAS EAR/PLS OXIMETRY MLT: CPT

## 2021-11-27 PROCEDURE — 6360000002 HC RX W HCPCS: Performed by: NURSE PRACTITIONER

## 2021-11-27 PROCEDURE — 6370000000 HC RX 637 (ALT 250 FOR IP): Performed by: PHYSICAL MEDICINE & REHABILITATION

## 2021-11-27 RX ADMIN — LISINOPRIL 5 MG: 5 TABLET ORAL at 09:42

## 2021-11-27 RX ADMIN — ALOGLIPTIN 6.25 MG: 12.5 TABLET, FILM COATED ORAL at 09:41

## 2021-11-27 RX ADMIN — HYDRALAZINE HYDROCHLORIDE 25 MG: 25 TABLET ORAL at 05:41

## 2021-11-27 RX ADMIN — HYDRALAZINE HYDROCHLORIDE 25 MG: 25 TABLET ORAL at 20:44

## 2021-11-27 RX ADMIN — LABETALOL HYDROCHLORIDE 200 MG: 100 TABLET, FILM COATED ORAL at 09:42

## 2021-11-27 RX ADMIN — MEROPENEM 1000 MG: 1 INJECTION INTRAVENOUS at 09:47

## 2021-11-27 RX ADMIN — SODIUM CHLORIDE, PRESERVATIVE FREE 10 ML: 5 INJECTION INTRAVENOUS at 20:44

## 2021-11-27 RX ADMIN — AMLODIPINE BESYLATE 10 MG: 10 TABLET ORAL at 09:41

## 2021-11-27 RX ADMIN — SODIUM CHLORIDE, PRESERVATIVE FREE 10 ML: 5 INJECTION INTRAVENOUS at 09:42

## 2021-11-27 RX ADMIN — MEROPENEM 1000 MG: 1 INJECTION INTRAVENOUS at 20:47

## 2021-11-27 RX ADMIN — HYDRALAZINE HYDROCHLORIDE 25 MG: 25 TABLET ORAL at 15:51

## 2021-11-27 RX ADMIN — INSULIN LISPRO 1 UNITS: 100 INJECTION, SOLUTION INTRAVENOUS; SUBCUTANEOUS at 17:38

## 2021-11-27 RX ADMIN — LABETALOL HYDROCHLORIDE 200 MG: 100 TABLET, FILM COATED ORAL at 20:44

## 2021-11-27 RX ADMIN — INSULIN LISPRO 1 UNITS: 100 INJECTION, SOLUTION INTRAVENOUS; SUBCUTANEOUS at 13:02

## 2021-11-27 RX ADMIN — INSULIN GLARGINE 10 UNITS: 100 INJECTION, SOLUTION SUBCUTANEOUS at 21:03

## 2021-11-27 RX ADMIN — OXYBUTYNIN CHLORIDE 5 MG: 5 TABLET, FILM COATED, EXTENDED RELEASE ORAL at 20:45

## 2021-11-27 ASSESSMENT — PAIN SCALES - GENERAL
PAINLEVEL_OUTOF10: 0
PAINLEVEL_OUTOF10: 0

## 2021-11-27 ASSESSMENT — PAIN SCALES - WONG BAKER: WONGBAKER_NUMERICALRESPONSE: 0

## 2021-11-27 NOTE — PROGRESS NOTES
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 No emesis today. Slept fair. Prior (baseline) level of function: Independent.     Current level of function:         Current  IRF-LESTER and Goals:   Occupational Therapy:    Short term goals  Time Frame for Short term goals: STGs=LTGs :   Long term goals  Time Frame for Long term goals : 7-10 days or until d/c  Long term goal 1: Pt will complete grooming tasks Ind  Long term goal 2: Pt will complete total body bathing mod I  Long term goal 3: Pt will complete UB dressing Ind  Long term goal 4: Pt will complete LB dressing Ind  Long term goal 5: Pt will doff/don footwear Ind  Long term goals 6: Pt will complete toileting mod I  Long term goal 7: Pt will complete functional transfers (bed, chair, toilet, shower) c DME PRN and mod I  Long term goal 8: Pt will perform therex/therax to facilitate increased strength/endurance/ax tolerance (c emphasis on dynamic standing balance/tolerance >8 mins, BUE endurance) c SBA  Long term goal 9: Pt will complete simple homemaking tasks c DME PRN and mod I :                                       Eating: Eating  Assistance Needed: Independent  Comment: X  CARE Score: 6  Discharge Goal: Independent       Oral Hygiene: Oral Hygiene  Assistance Needed: Independent  Comment: X  CARE Score: 6  Discharge Goal: Independent    UB/LB Bathing: Shower/Bathe Self  Assistance Needed: Independent  Comment: X  CARE Score: 6  Discharge Goal: Independent    UB Dressing: Upper Body Dressing  Assistance Needed: Independent  Comment: X  CARE Score: 6  Discharge Goal: Independent         LB Dressing: Lower Body Dressing  Assistance Needed: Independent  Comment: X  CARE Score: 6  Discharge Goal: Independent    Donning and [place] Footwear: Putting On/Taking Off Footwear  Assistance Needed: Independent  Comment: X  CARE Score: 6  Discharge Goal: Independent      Toileting: Toileting Hygiene  Assistance Needed: Independent  Comment: X  CARE Score: 6  Discharge Goal: Independent      Toilet Transfers: Toilet Transfer  Assistance Needed: Independent  Comment: Mod I  CARE Score: 6  Discharge Goal: Independent    Physical Therapy:         Long term goals  Time Frame for Long term goals : 7days  Long term goal 1: Pt will perform all bed mobility with independence  Long term goal 2: Pt will perform sit to stand, pivot and car transfers  with mod i  Long term goal 3: Pt will ambulate 150  feet on level surfaces and 10' on unlevel surfaces with mod I  using 4ww  Long term goal 4: Pt will ascend/descend curb step with   4ww   and up to 4    steps with  rail(s) with    mod I  Long term goal 5: Pt will retrieve light item from floor with mod i  using 4ww and reacher      Bed Mobility:   Sit to Lying  Assistance Needed: Independent  Comment: Ind without use of bed features, leg bag in place  CARE Score: 6  Discharge Goal: Independent  Roll Left and Right  Assistance Needed: Independent  Comment: Ind without use of bed features, leg bag in place  CARE Score: 6  Discharge Goal: Independent  Lying to Sitting on Side of Bed  Assistance Needed: Independent  Comment: Ind without use of bed features, leg bag in place  CARE Score: 6  Discharge Goal: Independent    Transfers:    Sit to Stand  Assistance Needed: Independent  Comment: Mod Ind using rollator and with leg bag in place  CARE Score: 6  Discharge Goal: Independent  Chair/Bed-to-Chair Transfer  Assistance Needed: Independent  Comment:  Mod Ind using rollator and with leg bag in place  CARE Score: 6  Discharge Goal: Independent     Car Transfer  Assistance Needed: Independent  Comment: Mod Ind using rollator and with leg bag in place  CARE Score: 6  Discharge Goal: Independent    Ambulation:    Walking Ability  Does the Patient Walk?: Yes     Walk 10 Feet  Assistance Needed: Independent  Comment: Mod Ind using rollator  CARE Score: 6  Discharge Goal: Independent     Walk 50 Feet with Two Turns  Assistance Needed: Independent  Comment: Mod Ind using rollator  CARE Score: 6  Discharge Goal: Independent     Walk 150 Feet  Assistance Needed: Independent  Comment: Mod Ind using rollator  Reason if not Attempted: Not attempted due to medical condition or safety concerns  CARE Score: 6  Discharge Goal: Independent     Walking 10 Feet on Uneven Surfaces  Assistance Needed: Independent  Comment: Mod Ind using rollator  CARE Score: 6  Discharge Goal: Independent     1 Step (Curb)  Assistance Needed: Independent  Comment: Mod Ind using rollator and with leg bag in place  CARE Score: 6  Discharge Goal: Independent     4 Steps  Assistance Needed: Independent  Comment: Mod Ind using railings with leg bag in place  CARE Score: 6  Discharge Goal: Independent     12 Steps  Comment: max tolerance during entire stay at ARU was 5 steps (limited by fatigue; pt was not performing this task at baseline)  Reason if not Attempted: Not attempted due to medical condition or safety concerns  CARE Score: 88  Discharge Goal: Not Applicable       Wheelchair:  w/c Ability: Wheelchair Ability  Uses a Wheelchair and/or Scooter?: No                Balance:        Object: Picking Up Object  Assistance Needed: Independent  Comment: Mod Ind with rollator and reacher  CARE Score: 6  Discharge Goal: Independent    I      Exam:    Blood pressure (!) 143/58, pulse 63, temperature 97.9 °F (36.6 °C), temperature source Oral, resp.  rate 16, height 5' 7.99\" (1.727 m), weight 210 lb 5.1 oz (95.4 kg), SpO2 99 %.    General: Oriented x3. In no distress. Anxious to get home this weekend. HEENT: Neck supple. No JVD or adenopathy. MMM. Pulmonary: No wheezes, rales or coughing. Cardiac: Regular rate and rhythm. Abdomen: Patient's abdomen is soft and nondistended. Bowel sounds were present throughout. There was no rebound, guarding or masses noted. SPC drains clear urine. Upper extremities: No new bruising or swelling. Lower extremities: No signs of DVT. Sitting balance was good. Standing balance was fair-.    Lab Results   Component Value Date    WBC 6.5 11/18/2021    HGB 9.6 (L) 11/18/2021    HCT 31.4 (L) 11/18/2021    MCV 94.0 11/18/2021     11/18/2021     Lab Results   Component Value Date    INR 0.99 05/19/2015    INR 1.31 03/23/2013    INR 1.37 01/24/2011    PROTIME 11.3 05/19/2015    PROTIME 14.2 03/23/2013    PROTIME 15.2 (H) 01/24/2011     Lab Results   Component Value Date    CREATININE 1.7 (H) 11/19/2021    BUN 27 (H) 11/19/2021     11/19/2021    K 4.6 11/19/2021     11/19/2021    CO2 22 11/19/2021     Lab Results   Component Value Date    ALT 16 11/17/2021    AST 18 11/17/2021    ALKPHOS 98 11/17/2021    BILITOT 0.3 11/17/2021       Expected length of stay  prior to a supervised level of function for discharge home with a walker and [facility] OT/PT is 11/28/2021. Recommendations:    1. Urinary tract infection with gait disturbance and frequent falls:    Still planning to transition to home care therapies after his discharge this Sunday. He must receive IV meropenem through 11/28/2021 for his ESBL UTI. Benefiting from his daily occupational and physical therapy. Completed adaptive equipment training. Ongoing pulmonary hygiene measures. [employer] cues and SBA for transfers. 2. DVT prophylaxis: Lovenox 40 mg subcu daily. As his walking distances have become protective for DVT, I will stop the Lovenox.     3. 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 He is on Lantus nightly and Humalog sliding scale. He is receiving oral Nesina and has his blood sugars checked at mealtime and bedtime. Blood sugars generally less than 150.  4. Chronic kidney disease with recent exacerbation: Avoiding nephrotoxic medications. Avoiding great fluctuations of blood pressure. Hydralazine. Periodic monitoring of his chemistries. Encouraging oral hydration.  Creatinine holding at 1.7.  5. Hypertension: Norvasc, Apresoline and Normodyne with lisinopril. Target systolic blood pressure is 120-140. Vital signs are checked at rest and with activity.  Blood pressure within target range again today.   6. Prostate cancer with suprapubic catheter: Outpatient follow-up with urology to develop a program for either every 3-week catheter replacements or weekly bladder flushing.

## 2021-11-28 VITALS
RESPIRATION RATE: 18 BRPM | HEART RATE: 65 BPM | OXYGEN SATURATION: 96 % | HEIGHT: 68 IN | TEMPERATURE: 98.1 F | SYSTOLIC BLOOD PRESSURE: 143 MMHG | WEIGHT: 210 LBS | BODY MASS INDEX: 31.83 KG/M2 | DIASTOLIC BLOOD PRESSURE: 61 MMHG

## 2021-11-28 LAB
GLUCOSE BLD-MCNC: 151 MG/DL (ref 70–99)
GLUCOSE BLD-MCNC: 98 MG/DL (ref 70–99)

## 2021-11-28 PROCEDURE — 6370000000 HC RX 637 (ALT 250 FOR IP): Performed by: NURSE PRACTITIONER

## 2021-11-28 PROCEDURE — 2580000003 HC RX 258: Performed by: NURSE PRACTITIONER

## 2021-11-28 PROCEDURE — 94761 N-INVAS EAR/PLS OXIMETRY MLT: CPT

## 2021-11-28 PROCEDURE — 82962 GLUCOSE BLOOD TEST: CPT

## 2021-11-28 PROCEDURE — 6360000002 HC RX W HCPCS: Performed by: NURSE PRACTITIONER

## 2021-11-28 PROCEDURE — 99239 HOSP IP/OBS DSCHRG MGMT >30: CPT | Performed by: PHYSICAL MEDICINE & REHABILITATION

## 2021-11-28 PROCEDURE — 6370000000 HC RX 637 (ALT 250 FOR IP): Performed by: PHYSICAL MEDICINE & REHABILITATION

## 2021-11-28 RX ADMIN — LABETALOL HYDROCHLORIDE 200 MG: 100 TABLET, FILM COATED ORAL at 09:12

## 2021-11-28 RX ADMIN — SODIUM CHLORIDE, PRESERVATIVE FREE 10 ML: 5 INJECTION INTRAVENOUS at 09:15

## 2021-11-28 RX ADMIN — AMLODIPINE BESYLATE 10 MG: 10 TABLET ORAL at 09:12

## 2021-11-28 RX ADMIN — LISINOPRIL 5 MG: 5 TABLET ORAL at 09:12

## 2021-11-28 RX ADMIN — HYDRALAZINE HYDROCHLORIDE 25 MG: 25 TABLET ORAL at 07:36

## 2021-11-28 RX ADMIN — MEROPENEM 1000 MG: 1 INJECTION INTRAVENOUS at 11:45

## 2021-11-28 RX ADMIN — ALOGLIPTIN 6.25 MG: 12.5 TABLET, FILM COATED ORAL at 09:12

## 2021-11-28 RX ADMIN — HYDRALAZINE HYDROCHLORIDE 25 MG: 25 TABLET ORAL at 13:38

## 2021-11-28 ASSESSMENT — PAIN SCALES - GENERAL
PAINLEVEL_OUTOF10: 0

## 2021-11-28 ASSESSMENT — PAIN SCALES - WONG BAKER: WONGBAKER_NUMERICALRESPONSE: 0

## 2021-11-28 NOTE — PROGRESS NOTES
Right arm Midline removed without issues. Cath tip intact. Very minimal bleeding, pressure applied and dressing intact. Pt tolerated well.

## 2021-11-28 NOTE — PROGRESS NOTES
Pt has placed his dentures in his gown pocket several times and they were placed in dirty linen barrel. This nurse and tech were able to locate them. Pt educated on risk of leaving dentures in linen and being thrown out. Educated to place them in denture cup to avoid losing them. Pt verbalizes understanding.

## 2021-11-28 NOTE — PROGRESS NOTES
Discharge instructions reviewed with pt and pt son. Medications and appts reviewed. Pt and son verbalize understanding.  Pt taken to vehicle by w/c and assisted into car at this time

## 2021-11-30 NOTE — CARE COORDINATION
11/30/2021 0900:  Case leandro spoke with [facility]. They haven't been able to reach the patient using the two phone numbers they have.   Case leandro faxed emergency contact list to [facility] at her request  f [phone]

## 2021-12-22 PROBLEM — N39.0 UTI (URINARY TRACT INFECTION): Status: RESOLVED | Noted: 2021-11-16 | Resolved: 2021-12-22

## 2022-01-06 ENCOUNTER — APPOINTMENT (OUTPATIENT)
Dept: GENERAL RADIOLOGY | Age: 82
End: 2022-01-06
Payer: MEDICARE

## 2022-01-06 ENCOUNTER — HOSPITAL ENCOUNTER (EMERGENCY)
Age: 82
Discharge: HOME | End: 2022-01-06
Attending: EMERGENCY MEDICINE
Payer: MEDICARE

## 2022-01-06 VITALS
RESPIRATION RATE: 18 BRPM | BODY MASS INDEX: 31.94 KG/M2 | DIASTOLIC BLOOD PRESSURE: 62 MMHG | SYSTOLIC BLOOD PRESSURE: 144 MMHG | TEMPERATURE: 99.3 F | WEIGHT: 210 LBS | OXYGEN SATURATION: 98 % | HEART RATE: 69 BPM

## 2022-01-06 DIAGNOSIS — R53.83 FATIGUE, UNSPECIFIED TYPE: ICD-10-CM

## 2022-01-06 DIAGNOSIS — R79.89 SERUM CREATININE RAISED: ICD-10-CM

## 2022-01-06 DIAGNOSIS — U07.1 COVID-19: Primary | ICD-10-CM

## 2022-01-06 DIAGNOSIS — R05.9 COUGH: ICD-10-CM

## 2022-01-06 DIAGNOSIS — N39.0 URINARY TRACT INFECTION WITHOUT HEMATURIA, SITE UNSPECIFIED: ICD-10-CM

## 2022-01-06 DIAGNOSIS — R77.8 TROPONIN LEVEL ELEVATED: ICD-10-CM

## 2022-01-06 LAB
ALBUMIN SERPL-MCNC: 3.5 GM/DL (ref 3.4–5)
ALP BLD-CCNC: 99 IU/L (ref 40–129)
ALT SERPL-CCNC: 16 U/L (ref 10–40)
ANION GAP SERPL CALCULATED.3IONS-SCNC: 14 MMOL/L (ref 4–16)
AST SERPL-CCNC: 23 IU/L (ref 15–37)
BACTERIA: NEGATIVE /HPF
BASOPHILS ABSOLUTE: 0 K/CU MM
BASOPHILS RELATIVE PERCENT: 0.5 % (ref 0–1)
BILIRUB SERPL-MCNC: 0.3 MG/DL (ref 0–1)
BILIRUBIN URINE: NEGATIVE MG/DL
BLOOD, URINE: ABNORMAL
BUN BLDV-MCNC: 33 MG/DL (ref 6–23)
CALCIUM SERPL-MCNC: 8.7 MG/DL (ref 8.3–10.6)
CHLORIDE BLD-SCNC: 102 MMOL/L (ref 99–110)
CLARITY: ABNORMAL
CO2: 20 MMOL/L (ref 21–32)
COLOR: YELLOW
CREAT SERPL-MCNC: 2 MG/DL (ref 0.9–1.3)
DIFFERENTIAL TYPE: ABNORMAL
EOSINOPHILS ABSOLUTE: 0.1 K/CU MM
EOSINOPHILS RELATIVE PERCENT: 1.2 % (ref 0–3)
GFR AFRICAN AMERICAN: 39 ML/MIN/1.73M2
GFR NON-AFRICAN AMERICAN: 32 ML/MIN/1.73M2
GLUCOSE BLD-MCNC: 176 MG/DL (ref 70–99)
GLUCOSE, URINE: 50 MG/DL
HCT VFR BLD CALC: 33.7 % (ref 42–52)
HEMOGLOBIN: 10.3 GM/DL (ref 13.5–18)
IMMATURE NEUTROPHIL %: 0.2 % (ref 0–0.43)
KETONES, URINE: NEGATIVE MG/DL
LEUKOCYTE ESTERASE, URINE: ABNORMAL
LYMPHOCYTES ABSOLUTE: 2 K/CU MM
LYMPHOCYTES RELATIVE PERCENT: 24.3 % (ref 24–44)
MAGNESIUM: 2 MG/DL (ref 1.8–2.4)
MCH RBC QN AUTO: 29.6 PG (ref 27–31)
MCHC RBC AUTO-ENTMCNC: 30.6 % (ref 32–36)
MCV RBC AUTO: 96.8 FL (ref 78–100)
MONOCYTES ABSOLUTE: 1 K/CU MM
MONOCYTES RELATIVE PERCENT: 12.4 % (ref 0–4)
MUCUS: ABNORMAL HPF
NITRITE URINE, QUANTITATIVE: NEGATIVE
NUCLEATED RBC %: 0 %
PDW BLD-RTO: 16 % (ref 11.7–14.9)
PH, URINE: 7 (ref 5–8)
PLATELET # BLD: 196 K/CU MM (ref 140–440)
PMV BLD AUTO: 11.2 FL (ref 7.5–11.1)
POTASSIUM SERPL-SCNC: 5.3 MMOL/L (ref 3.5–5.1)
PRO-BNP: 918 PG/ML
PROTEIN UA: 100 MG/DL
RAPID INFLUENZA  B AGN: NEGATIVE
RAPID INFLUENZA A AGN: NEGATIVE
RBC # BLD: 3.48 M/CU MM (ref 4.6–6.2)
RBC URINE: 11 /HPF (ref 0–3)
SARS-COV-2, NAAT: DETECTED
SEGMENTED NEUTROPHILS ABSOLUTE COUNT: 5.1 K/CU MM
SEGMENTED NEUTROPHILS RELATIVE PERCENT: 61.4 % (ref 36–66)
SODIUM BLD-SCNC: 136 MMOL/L (ref 135–145)
SOURCE: ABNORMAL
SPECIFIC GRAVITY UA: 1.02 (ref 1–1.03)
SQUAMOUS EPITHELIAL: 1 /HPF
TOTAL IMMATURE NEUTOROPHIL: 0.02 K/CU MM
TOTAL NUCLEATED RBC: 0 K/CU MM
TOTAL PROTEIN: 6.5 GM/DL (ref 6.4–8.2)
TRICHOMONAS: ABNORMAL /HPF
TROPONIN T: 0.04 NG/ML
UROBILINOGEN, URINE: NEGATIVE MG/DL (ref 0.2–1)
WBC # BLD: 8.3 K/CU MM (ref 4–10.5)
WBC UA: 463 /HPF (ref 0–2)

## 2022-01-06 PROCEDURE — 80053 COMPREHEN METABOLIC PANEL: CPT

## 2022-01-06 PROCEDURE — 85025 COMPLETE CBC W/AUTO DIFF WBC: CPT

## 2022-01-06 PROCEDURE — 99285 EMERGENCY DEPT VISIT HI MDM: CPT

## 2022-01-06 PROCEDURE — 87086 URINE CULTURE/COLONY COUNT: CPT

## 2022-01-06 PROCEDURE — 93005 ELECTROCARDIOGRAM TRACING: CPT | Performed by: EMERGENCY MEDICINE

## 2022-01-06 PROCEDURE — 87635 SARS-COV-2 COVID-19 AMP PRB: CPT

## 2022-01-06 PROCEDURE — 87804 INFLUENZA ASSAY W/OPTIC: CPT

## 2022-01-06 PROCEDURE — 84484 ASSAY OF TROPONIN QUANT: CPT

## 2022-01-06 PROCEDURE — 6370000000 HC RX 637 (ALT 250 FOR IP): Performed by: EMERGENCY MEDICINE

## 2022-01-06 PROCEDURE — 71045 X-RAY EXAM CHEST 1 VIEW: CPT

## 2022-01-06 PROCEDURE — 83880 ASSAY OF NATRIURETIC PEPTIDE: CPT

## 2022-01-06 PROCEDURE — 83735 ASSAY OF MAGNESIUM: CPT

## 2022-01-06 PROCEDURE — 81001 URINALYSIS AUTO W/SCOPE: CPT

## 2022-01-06 RX ORDER — BENZONATATE 100 MG/1
100 CAPSULE ORAL 3 TIMES DAILY PRN
Qty: 10 CAPSULE | Refills: 0 | Status: SHIPPED | OUTPATIENT
Start: 2022-01-06 | End: 2022-01-13

## 2022-01-06 RX ORDER — ACETAMINOPHEN 325 MG/1
650 TABLET ORAL EVERY 6 HOURS PRN
Qty: 120 TABLET | Refills: 3 | Status: ON HOLD | OUTPATIENT
Start: 2022-01-06

## 2022-01-06 RX ORDER — CEPHALEXIN 250 MG/1
500 CAPSULE ORAL ONCE
Status: COMPLETED | OUTPATIENT
Start: 2022-01-06 | End: 2022-01-06

## 2022-01-06 RX ORDER — BENZONATATE 100 MG/1
100 CAPSULE ORAL ONCE
Status: COMPLETED | OUTPATIENT
Start: 2022-01-06 | End: 2022-01-06

## 2022-01-06 RX ORDER — GUAIFENESIN 100 MG/5ML
200 SOLUTION ORAL ONCE
Status: COMPLETED | OUTPATIENT
Start: 2022-01-06 | End: 2022-01-06

## 2022-01-06 RX ORDER — CEPHALEXIN 500 MG/1
500 CAPSULE ORAL 2 TIMES DAILY
Qty: 14 CAPSULE | Refills: 0 | Status: SHIPPED | OUTPATIENT
Start: 2022-01-06 | End: 2022-01-13

## 2022-01-06 RX ORDER — GUAIFENESIN/DEXTROMETHORPHAN 100-10MG/5
5 SYRUP ORAL 4 TIMES DAILY PRN
Qty: 120 ML | Refills: 0 | Status: SHIPPED | OUTPATIENT
Start: 2022-01-06 | End: 2022-01-16

## 2022-01-06 RX ORDER — ALBUTEROL SULFATE 90 UG/1
2 AEROSOL, METERED RESPIRATORY (INHALATION) EVERY 4 HOURS PRN
Qty: 18 G | Refills: 1 | Status: ON HOLD | OUTPATIENT
Start: 2022-01-06 | End: 2022-04-14 | Stop reason: HOSPADM

## 2022-01-06 RX ADMIN — BENZONATATE 100 MG: 100 CAPSULE ORAL at 17:42

## 2022-01-06 RX ADMIN — CEPHALEXIN 500 MG: 250 CAPSULE ORAL at 19:12

## 2022-01-06 RX ADMIN — GUAIFENESIN 200 MG: 200 SOLUTION ORAL at 17:42

## 2022-01-06 ASSESSMENT — ENCOUNTER SYMPTOMS
ALLERGIC/IMMUNOLOGIC NEGATIVE: 1
COUGH: 1
GASTROINTESTINAL NEGATIVE: 1
EYES NEGATIVE: 1

## 2022-01-06 NOTE — ED PROVIDER NOTES
[facility]      TRIAGE CHIEF COMPLAINT:   Fatigue      Teller:  Emmanuel Galvin is a 78 y.o. male that presents with complaint of fatigue, malaise. Patient states he has been coughing and has had some chills generalized fatigue malaise no chest pain or shortness of breath abdominal pain no headache or weakness numbness or tingling. No travel or sick contacts. He has been vaccinated for Covid. Denies other questions or concerns just feels tired. REVIEW OF SYSTEMS:  At least 10 systems reviewed and otherwise acutely negative except as in the [address] Review of Systems   Constitutional: Positive for activity change, appetite change and fatigue. HENT: Negative. Eyes: Negative. Respiratory: Positive for cough. Cardiovascular: Negative. Gastrointestinal: Negative. Endocrine: Negative. Genitourinary: Negative. Musculoskeletal: Negative. Skin: Negative. Allergic/Immunologic: Negative. Neurological: Negative. Hematological: Negative. Psychiatric/Behavioral: Negative. All other systems reviewed and are negative. Past Medical History:   Diagnosis Date    Acute urinary tract infection 3/15/2012    Ataxia 2010    Diabetes mellitus ([facility] [address] 2011    type 2, controlled    Fusion of spine of cervical region 10/2012    Gait disturbance 2011    History of prostate cancer 1996    adenocarcinoma    History of tobacco use 1959    Hyperlipidemia 2011    Osteoarthritis 2011     Past Surgical History:   Procedure Laterality Date    BLADDER SURGERY      COLON SURGERY      OTHER SURGICAL HISTORY  3/28/13    Cysto with removal of artificial sphinter and placement of suprapubic catheter.     PROSTATE SURGERY      PROSTATECTOMY  1996    infection     Family History   Problem Relation Age of Onset    Cancer Mother     Diabetes Father     Heart Disease Father     High Blood Pressure Father     Diabetes Sister     High Blood Pressure Sister     Cancer Brother prostate, breast    Diabetes Brother     Cancer Maternal Uncle     Diabetes Maternal Grandmother     Stroke Maternal Grandfather      Social History     Socioeconomic History    Marital status:      Spouse name: Not on file    Number of children: 3    Years of education: Not on file    Highest education level: Not on file   Occupational History    Not on file   Tobacco Use    Smoking status: Former Smoker     Packs/day: 0.50     Quit date: 1976     Years since quittin.5    Smokeless tobacco: Never Used   Vaping Use    Vaping Use: Not on file   Substance and Sexual Activity    Alcohol use: No     Comment: Quit in     Drug use: No    Sexual activity: Not on file   Other Topics Concern    Not on file   Social History Narrative    Not on file     Social Determinants of Health     Financial Resource Strain:     Difficulty of Paying Living Expenses: Not on file   Food Insecurity:     Worried About [address] in the Last Year: Not on file    [address] [employer] in the Last Year: Not on file   Transportation Needs:     Lack of Transportation (Medical): Not on file    Lack of Transportation (Non-Medical):  Not on file   Physical Activity:     Days of Exercise per Week: Not on file    Minutes of Exercise per Session: Not on file   Stress:     Feeling of Stress : Not on file   Social Connections:     Frequency of Communication with Friends and Family: Not on file    Frequency of Social Gatherings with Friends and Family: Not on file    Attends Hinduism Services: Not on file    Active Member of Clubs or Organizations: Not on file    Attends Club or Organization Meetings: Not on file    Marital Status: Not on file   Intimate Partner Violence:     Fear of Current or Ex-Partner: Not on file    Emotionally Abused: Not on file    Physically Abused: Not on file    Sexually Abused: Not on file   Housing Stability:     Unable to Pay for Housing in the Last Year: Not on file    Number of Places Lived in the Last Year: Not on file    Unstable Housing in the Last Year: Not on file     No current facility-administered medications for this encounter. Current Outpatient Medications   Medication Sig Dispense Refill    guaiFENesin-dextromethorphan (ROBITUSSIN DM) 100-10 MG/5ML syrup Take 5 mLs by mouth 4 times daily as needed for Cough 120 mL 0    benzonatate (TESSALON PERLES) 100 MG capsule Take 1 capsule by mouth 3 times daily as needed for Cough 10 capsule 0    albuterol sulfate HFA (PROVENTIL HFA) 108 (90 Base) MCG/ACT inhaler Inhale 2 puffs into the lungs every 4 hours as needed for Wheezing or Shortness of Breath With spacer (and mask if indicated). Thanks. 18 g 1    cephALEXin (KEFLEX) 500 MG capsule Take 1 capsule by mouth 2 times daily for 7 days 14 capsule 0    acetaminophen (TYLENOL) 325 MG tablet Take 2 tablets by mouth every 6 hours as needed for Pain 120 tablet 3    hydrALAZINE (APRESOLINE) 25 MG tablet Take 1 tablet by mouth every 8 hours 90 tablet 0    labetalol (NORMODYNE) 200 MG tablet Take 1 tablet by mouth every 12 hours 60 tablet 0    amLODIPine (NORVASC) 10 MG tablet Take 1 tablet by mouth daily 30 tablet 0    oxybutynin (DITROPAN-XL) 5 MG extended release tablet Take 1 tablet by mouth daily for 7 days 7 tablet 0    lisinopril (PRINIVIL;ZESTRIL) 5 MG tablet Take 1 tablet by mouth daily 30 tablet 2    Insulin Detemir (LEVEMIR SC) Inject 40 Units into the skin In the morning      Insulin Detemir (LEVEMIR FLEXPEN SC) Inject 10 Units into the skin nightly      docusate sodium (COLACE, DULCOLAX) 100 MG CAPS Take 100 mg by mouth 2 times daily as needed for Constipation. 20 capsule 0    glimepiride (AMARYL) 4 MG tablet Take 4 mg by mouth every morning (before breakfast). No Known Allergies  No current facility-administered medications for this encounter.      Current Outpatient Medications   Medication Sig Dispense Refill    guaiFENesin-dextromethorphan (ROBITUSSIN DM) 100-10 MG/5ML syrup Take 5 mLs by mouth 4 times daily as needed for Cough 120 mL 0    benzonatate (TESSALON PERLES) 100 MG capsule Take 1 capsule by mouth 3 times daily as needed for Cough 10 capsule 0    albuterol sulfate HFA (PROVENTIL HFA) 108 (90 Base) MCG/ACT inhaler Inhale 2 puffs into the lungs every 4 hours as needed for Wheezing or Shortness of Breath With spacer (and mask if indicated). Thanks. 18 g 1    cephALEXin (KEFLEX) 500 MG capsule Take 1 capsule by mouth 2 times daily for 7 days 14 capsule 0    acetaminophen (TYLENOL) 325 MG tablet Take 2 tablets by mouth every 6 hours as needed for Pain 120 tablet 3    hydrALAZINE (APRESOLINE) 25 MG tablet Take 1 tablet by mouth every 8 hours 90 tablet 0    labetalol (NORMODYNE) 200 MG tablet Take 1 tablet by mouth every 12 hours 60 tablet 0    amLODIPine (NORVASC) 10 MG tablet Take 1 tablet by mouth daily 30 tablet 0    oxybutynin (DITROPAN-XL) 5 MG extended release tablet Take 1 tablet by mouth daily for 7 days 7 tablet 0    lisinopril (PRINIVIL;ZESTRIL) 5 MG tablet Take 1 tablet by mouth daily 30 tablet 2    Insulin Detemir (LEVEMIR SC) Inject 40 Units into the skin In the morning      Insulin Detemir (LEVEMIR FLEXPEN SC) Inject 10 Units into the skin nightly      docusate sodium (COLACE, DULCOLAX) 100 MG CAPS Take 100 mg by mouth 2 times daily as needed for Constipation. 20 capsule 0    glimepiride (AMARYL) 4 MG tablet Take 4 mg by mouth every morning (before breakfast). Nursing Notes Reviewed    VITAL SIGNS:  ED Triage Vitals   Enc Vitals Group      BP       Pulse       Resp       Temp       Temp src       SpO2       Weight       Height       Head Circumference       Peak Flow       Pain Score       Pain Loc       Pain Edu? Excl. in [address]? PHYSICAL EXAM:  Physical Exam  Vitals and nursing note reviewed. Constitutional:       General: He is not in acute distress.      Appearance: Normal appearance. He is well-developed and well-groomed. He is not ill-appearing, toxic-appearing or diaphoretic. HENT:      Head: Normocephalic and atraumatic. Right Ear: External ear normal.      Left Ear: External ear normal.      Nose: Congestion present. Eyes:      General: No scleral icterus. Right eye: No discharge. Left eye: No discharge. Extraocular Movements: Extraocular movements intact. Conjunctiva/sclera: Conjunctivae normal.   Neck:      Vascular: No JVD. Trachea: Phonation normal.   Cardiovascular:      Rate and Rhythm: Normal rate and regular rhythm. Pulses: Normal pulses. Heart sounds: Normal heart sounds. No murmur heard. No friction rub. No gallop. Pulmonary:      Effort: Pulmonary effort is normal. No respiratory distress. Breath sounds: No stridor. No wheezing, rhonchi or rales. Abdominal:      General: Bowel sounds are normal. There is no distension. Palpations: Abdomen is soft. There is no mass. Tenderness: There is no abdominal tenderness. There is no guarding or rebound. Negative signs include Lima's sign, Rovsing's sign and McBurney's sign. Hernia: No hernia is present. Musculoskeletal:         General: No swelling, tenderness, deformity or signs of injury. Normal range of motion. Cervical back: Full passive range of motion without pain and normal range of motion. No edema, erythema, signs of trauma, rigidity, torticollis or crepitus. No pain with movement. Normal range of motion. Right lower leg: No edema. Left lower leg: No edema. Skin:     General: Skin is warm. Coloration: Skin is not jaundiced or pale. Findings: No bruising, erythema, lesion or rash. Neurological:      General: No focal deficit present. Mental Status: He is alert and oriented to person, place, and time. GCS: GCS eye subscore is 4. GCS verbal subscore is 5. GCS motor subscore is 6.       Cranial Nerves: Cranial nerves are intact. No cranial nerve deficit, dysarthria or facial asymmetry. Sensory: Sensation is intact. No sensory deficit. Motor: Motor function is intact. No weakness, tremor, atrophy, abnormal muscle tone or seizure activity. Coordination: Coordination is intact. Coordination normal.   Psychiatric:         Mood and Affect: Mood normal.         Behavior: Behavior normal. Behavior is cooperative. Thought Content:  Thought content normal.         Judgment: Judgment normal.           I have reviewed andinterpreted all of the currently available lab results from this visit (if applicable):    Results for orders placed or performed during the hospital encounter of 01/06/22   COVID-19, Rapid    Specimen: Nasopharyngeal   Result Value Ref Range    Source THROAT     SARS-CoV-2, NAAT DETECTED (A) NOT DETECTED   Rapid Flu Swab    Specimen: Nasopharyngeal   Result Value Ref Range    Rapid Influenza A Ag NEGATIVE NEGATIVE    Rapid Influenza B Ag NEGATIVE NEGATIVE   CBC Auto Differential   Result Value Ref Range    WBC 8.3 4.0 - 10.5 K/CU MM    RBC 3.48 (L) 4.6 - 6.2 M/CU MM    Hemoglobin 10.3 (L) 13.5 - 18.0 GM/DL    Hematocrit 33.7 (L) 42 - 52 %    MCV 96.8 78 - 100 FL    MCH 29.6 27 - 31 PG    MCHC 30.6 (L) 32.0 - 36.0 %    RDW 16.0 (H) 11.7 - 14.9 %    Platelets 284 836 - 179 K/CU MM    MPV 11.2 (H) 7.5 - 11.1 FL    Differential Type AUTOMATED DIFFERENTIAL     Segs Relative 61.4 36 - 66 %    Lymphocytes % 24.3 24 - 44 %    Monocytes % 12.4 (H) 0 - 4 %    Eosinophils % 1.2 0 - 3 %    Basophils % 0.5 0 - 1 %    Segs Absolute 5.1 K/CU MM    Lymphocytes Absolute 2.0 K/CU MM    Monocytes Absolute 1.0 K/CU MM    Eosinophils Absolute 0.1 K/CU MM    Basophils Absolute 0.0 K/CU MM    Nucleated RBC % 0.0 %    Total Nucleated RBC 0.0 K/CU MM    Total Immature Neutrophil 0.02 K/CU MM    Immature Neutrophil % 0.2 0 - 0.43 %   CMP   Result Value Ref Range    Sodium 136 135 - 145 MMOL/L    Potassium 5.3 (H) 3.5 - 5.1 MMOL/L    Chloride 102 99 - 110 mMol/L    CO2 20 (L) 21 - 32 MMOL/L    BUN 33 (H) 6 - 23 MG/DL    CREATININE 2.0 (H) 0.9 - 1.3 MG/DL    Glucose 176 (H) 70 - 99 MG/DL    Calcium 8.7 8.3 - 10.6 MG/DL    Albumin 3.5 3.4 - 5.0 GM/DL    Total Protein 6.5 6.4 - 8.2 GM/DL    Total Bilirubin 0.3 0.0 - 1.0 MG/DL    ALT 16 10 - 40 U/L    AST 23 15 - 37 IU/L    Alkaline Phosphatase 99 40 - 129 IU/L    GFR Non- 32 (L) >60 mL/min/1.73m2    GFR  39 (L) >60 mL/min/1.73m2    Anion Gap 14 4 - 16   Troponin   Result Value Ref Range    Troponin T 0.045 (H) <0.01 NG/ML   Brain Natriuretic Peptide   Result Value Ref Range    Pro-.0 (H) <300 PG/ML   Urinalysis   Result Value Ref Range    Color, UA YELLOW YELLOW    Clarity, UA CLOUDY (A) CLEAR    Glucose, Urine 50 (A) NEGATIVE MG/DL    Bilirubin Urine NEGATIVE NEGATIVE MG/DL    Ketones, Urine NEGATIVE NEGATIVE MG/DL    Specific Gravity, UA 1.020 1.001 - 1.035    Blood, Urine SMALL (A) NEGATIVE    pH, Urine 7.0 5.0 - 8.0    Protein,  (A) NEGATIVE MG/DL    Urobilinogen, Urine NEGATIVE 0.2 - 1.0 MG/DL    Nitrite Urine, Quantitative NEGATIVE NEGATIVE    Leukocyte Esterase, Urine LARGE (A) NEGATIVE    RBC, UA 11 (H) 0 - 3 /HPF    WBC,  (H) 0 - 2 /HPF    Bacteria, UA NEGATIVE NEGATIVE /HPF    Squam Epithel, UA 1 /HPF    Mucus, UA MODERATE (A) NEGATIVE HPF    Trichomonas, UA NONE SEEN NONE SEEN /HPF   Magnesium   Result Value Ref Range    Magnesium 2.0 1.8 - 2.4 mg/dl   EKG 12 Lead   Result Value Ref Range    Ventricular Rate 68 BPM    Atrial Rate 68 BPM    P-R Interval 188 ms    QRS Duration 76 ms    Q-T Interval 398 ms    QTc Calculation (Bazett) 423 ms    P Axis 102 degrees    R Axis -2 degrees    T Axis 39 degrees    Diagnosis       Normal sinus rhythm  Normal ECG  When compared with ECG of 16-NOV-2021 12:54,  SC interval has decreased          Radiographs (if obtained):  [] The following radiograph was interpreted by myself in the absence of a radiologist:  [x] Radiologist's Report Reviewed:    CXR    EKG (if obtained): (All EKG's are interpreted by myself in the absence of a cardiologist)    12 lead EKG per my interpretation:  Normal Sinus Rhythm 68  Axis is   Normal  QTc is  423  There is no specific T wave changes appreciated. There is no specific ST wave changes appreciated. Prior EKG to compare with was not available       MDM:    Patient here with fatigue, cough, malaise. Symptoms as noted to. He has been vaccinated for Covid these been having cough fatigue malaise chills no energy. Denies any headache chest pain or abdominal pain no fevers no nausea vomiting or diarrhea no swelling or rashes. Otherwise appears well vital signs are stable will get basic lab work, chest x-ray, rapid Covid. EKG is negative, do not see any signs of stroke. Patient recheck doing well he is texting on his phone no distress vital signs are stable. Work-up performed he does have a Hopper catheter in place does have a slight UTI will give him antibiotics will culture. X-ray is negative labs show chronic kidney disease elevated creatinine troponin levels elevated chronically so he has no chest pain he did test positive for COVID-19 which I think is the cause of all his symptoms. I doubt he has DVT PE or AAA he has been vaccinated. He has home health care at this time and he is okay to go home is not requiring oxygen EKG is negative again he has no current chest pain with borderline fever cough fatigue malaise positive rapid Covid positive likely has COVID-19. Patient comfortable at home discharge given return precautions and follow-up information. Stable.  Discharged with Keflex    CLINICAL IMPRESSION:  Final diagnoses:   Fatigue, unspecified type   Cough   COVID-19   Urinary tract infection without hematuria, site unspecified   Troponin level elevated   Serum creatinine raised       (Please note that portions of this note may have been completed with a voice recognition program. Efforts were made to edit the dictations but occasionally words aremis-transcribed.)    DISPOSITION REFERRAL (if applicable): Marilyn Reeves MD  [facility] [address] [phone]    Schedule an appointment as soon as possible for a visit in 1 day      [address] [facility]  [facility] [phone]    If symptoms worsen      DISPOSITION MEDICATIONS (if applicable):  New Prescriptions    ACETAMINOPHEN (TYLENOL) 325 MG TABLET    Take 2 tablets by mouth every 6 hours as needed for Pain    ALBUTEROL SULFATE HFA (PROVENTIL HFA) 108 (90 BASE) MCG/ACT INHALER    Inhale 2 puffs into the lungs every 4 hours as needed for Wheezing or Shortness of Breath With spacer (and mask if indicated). Thanks.     BENZONATATE (TESSALON PERLES) 100 MG CAPSULE    Take 1 capsule by mouth 3 times daily as needed for Cough    CEPHALEXIN (KEFLEX) 500 MG CAPSULE    Take 1 capsule by mouth 2 times daily for 7 days    GUAIFENESIN-DEXTROMETHORPHAN (ROBITUSSIN DM) 100-10 MG/5ML SYRUP    Take 5 mLs by mouth 4 times daily as needed for Cough          DO Robert James DO  01/06/22 2001

## 2022-01-06 NOTE — CARE COORDINATION
CM consult per Dr Maurice Morales to assist with discharge planning. Pt is from home, has indwelling cath, [facility] to manage his cath care. Per notes pt has DME: bars into bathroom, shower chair, grab bars around toilet, cane, 4 wheeled walker, W/C-manual, available bed side toilet,not using. Supportive son/Jon # [phone]. Pt is independent ADL's. Pt to ER today via squad, from home for c/o fatigue. Spoke with Dr Maurice Morales pt has positive COVID test, UTI, no acute findings for admission. POC will be for discharge home.  ALMA DELIA,RN/CM

## 2022-01-07 ENCOUNTER — CARE COORDINATION (OUTPATIENT)
Dept: CARE COORDINATION | Age: 82
End: 2022-01-07

## 2022-01-07 LAB
EKG ATRIAL RATE: 68 BPM
EKG DIAGNOSIS: NORMAL
EKG P AXIS: 102 DEGREES
EKG P-R INTERVAL: 188 MS
EKG Q-T INTERVAL: 398 MS
EKG QRS DURATION: 76 MS
EKG QTC CALCULATION (BAZETT): 423 MS
EKG R AXIS: -2 DEGREES
EKG T AXIS: 39 DEGREES
EKG VENTRICULAR RATE: 68 BPM

## 2022-01-07 PROCEDURE — 93010 ELECTROCARDIOGRAM REPORT: CPT | Performed by: INTERNAL MEDICINE

## 2022-01-10 ENCOUNTER — CARE COORDINATION (OUTPATIENT)
Dept: CARE COORDINATION | Age: 82
End: 2022-01-10

## 2022-01-10 LAB
CULTURE: NORMAL
Lab: NORMAL
SPECIMEN: NORMAL

## 2022-01-10 NOTE — CARE COORDINATION
Call to pt for ed f/u, no answer, lmtc. Patient contacted regarding COVID-19 diagnosis. Discussed COVID-19 related testing which was available at this time. Test results were positive. Patient informed of results, if available? Yes. Ambulatory Care Manager contacted the patient by telephone to perform post discharge assessment. Call within 2 business days of discharge: Yes. Verified name and  with patient as identifiers. Provided introduction to self, and explanation of the CTN/ACM role, and reason for call due to risk factors for infection and/or exposure to COVID-19. Symptoms reviewed with patient who verbalized the following symptoms: no new symptoms and no worsening symptoms. Due to no new or worsening symptoms encounter was not routed to provider for escalation. Discussed follow-up appointments. If no appointment was previously scheduled, appointment scheduling offered: No.  [address] follow up appointment(s): No future appointments. Non-[facility] follow up appointment(s):     Non-face-to-face services provided:  Obtained and reviewed discharge summary and/or continuity of care documents     Advance Care Planning:   Does patient have an Advance Directive:  not on file. Educated patient about risk for severe COVID-19 due to risk factors according to CDC guidelines. ACM reviewed discharge instructions, medical action plan and red flag symptoms with the patient who verbalized understanding. Discussed COVID vaccination status: Yes. Education provided on COVID-19 vaccination as appropriate. Discussed exposure protocols and quarantine with CDC Guidelines. Patient was given an opportunity to verbalize any questions and concerns and agrees to contact ACM or health care provider for questions related to their healthcare. Reviewed and educated patient on any new and changed medications related to discharge diagnosis     Was patient discharged with a pulse oximeter?  No Discussed and confirmed pulse oximeter discharge instructions and when to notify provider or seek emergency care. ACM provided contact information. Plan for follow-up call in 5-7 days based on severity of symptoms and risk factors.

## 2022-01-14 ENCOUNTER — CARE COORDINATION (OUTPATIENT)
Dept: CARE COORDINATION | Age: 82
End: 2022-01-14

## 2022-01-14 NOTE — CARE COORDINATION
Call to pt for ed f/u, no answer, lmtc. No further outreach scheduled with this CTN/ACM. Episode of Care resolved. Patient has this CTN/ACM contact information if future needs arise.

## 2022-03-16 ENCOUNTER — HOSPITAL ENCOUNTER (INPATIENT)
Age: 82
LOS: 13 days | Discharge: NURSING, REHAB OR LONG-TERM CARE | DRG: 659 | End: 2022-03-30
Attending: EMERGENCY MEDICINE | Admitting: INTERNAL MEDICINE
Payer: MEDICARE

## 2022-03-16 ENCOUNTER — APPOINTMENT (OUTPATIENT)
Dept: GENERAL RADIOLOGY | Age: 82
DRG: 659 | End: 2022-03-16
Payer: MEDICARE

## 2022-03-16 DIAGNOSIS — Z79.4 TYPE 2 DIABETES MELLITUS WITH HYPERGLYCEMIA, WITH LONG-TERM CURRENT USE OF INSULIN (HCC): ICD-10-CM

## 2022-03-16 DIAGNOSIS — E11.65 TYPE 2 DIABETES MELLITUS WITH HYPERGLYCEMIA, WITH LONG-TERM CURRENT USE OF INSULIN (HCC): ICD-10-CM

## 2022-03-16 DIAGNOSIS — G93.40 ENCEPHALOPATHY: ICD-10-CM

## 2022-03-16 DIAGNOSIS — N18.4 CHRONIC KIDNEY DISEASE (CKD), STAGE IV (SEVERE) (HCC): ICD-10-CM

## 2022-03-16 DIAGNOSIS — J81.0 ACUTE PULMONARY EDEMA (HCC): ICD-10-CM

## 2022-03-16 DIAGNOSIS — R79.89 ELEVATED BRAIN NATRIURETIC PEPTIDE (BNP) LEVEL: ICD-10-CM

## 2022-03-16 DIAGNOSIS — N39.0 URINARY TRACT INFECTION ASSOCIATED WITH CYSTOSTOMY CATHETER, INITIAL ENCOUNTER (HCC): ICD-10-CM

## 2022-03-16 DIAGNOSIS — T83.510A URINARY TRACT INFECTION ASSOCIATED WITH CYSTOSTOMY CATHETER, INITIAL ENCOUNTER (HCC): ICD-10-CM

## 2022-03-16 DIAGNOSIS — N18.9 CHRONIC KIDNEY DISEASE, UNSPECIFIED CKD STAGE: ICD-10-CM

## 2022-03-16 DIAGNOSIS — A41.9 SEPSIS, DUE TO UNSPECIFIED ORGANISM, UNSPECIFIED WHETHER ACUTE ORGAN DYSFUNCTION PRESENT (HCC): Primary | ICD-10-CM

## 2022-03-16 PROCEDURE — 71045 X-RAY EXAM CHEST 1 VIEW: CPT

## 2022-03-16 PROCEDURE — 85007 BL SMEAR W/DIFF WBC COUNT: CPT

## 2022-03-16 PROCEDURE — 83690 ASSAY OF LIPASE: CPT

## 2022-03-16 PROCEDURE — 99285 EMERGENCY DEPT VISIT HI MDM: CPT

## 2022-03-16 PROCEDURE — 96375 TX/PRO/DX INJ NEW DRUG ADDON: CPT

## 2022-03-16 PROCEDURE — 96365 THER/PROPH/DIAG IV INF INIT: CPT

## 2022-03-16 PROCEDURE — 84484 ASSAY OF TROPONIN QUANT: CPT

## 2022-03-16 PROCEDURE — 96361 HYDRATE IV INFUSION ADD-ON: CPT

## 2022-03-16 PROCEDURE — 80053 COMPREHEN METABOLIC PANEL: CPT

## 2022-03-16 PROCEDURE — 82248 BILIRUBIN DIRECT: CPT

## 2022-03-16 PROCEDURE — 83880 ASSAY OF NATRIURETIC PEPTIDE: CPT

## 2022-03-16 PROCEDURE — 93005 ELECTROCARDIOGRAM TRACING: CPT | Performed by: EMERGENCY MEDICINE

## 2022-03-17 ENCOUNTER — ANESTHESIA (OUTPATIENT)
Dept: OPERATING ROOM | Age: 82
DRG: 659 | End: 2022-03-17
Payer: MEDICARE

## 2022-03-17 ENCOUNTER — APPOINTMENT (OUTPATIENT)
Dept: CT IMAGING | Age: 82
DRG: 659 | End: 2022-03-17
Payer: MEDICARE

## 2022-03-17 ENCOUNTER — APPOINTMENT (OUTPATIENT)
Dept: GENERAL RADIOLOGY | Age: 82
DRG: 659 | End: 2022-03-17
Payer: MEDICARE

## 2022-03-17 ENCOUNTER — ANESTHESIA EVENT (OUTPATIENT)
Dept: OPERATING ROOM | Age: 82
DRG: 659 | End: 2022-03-17
Payer: MEDICARE

## 2022-03-17 VITALS
SYSTOLIC BLOOD PRESSURE: 103 MMHG | RESPIRATION RATE: 12 BRPM | OXYGEN SATURATION: 100 % | DIASTOLIC BLOOD PRESSURE: 49 MMHG

## 2022-03-17 PROBLEM — N39.0 SEPSIS SECONDARY TO UTI (HCC): Status: ACTIVE | Noted: 2022-03-17

## 2022-03-17 PROBLEM — A41.9 SEPSIS SECONDARY TO UTI (HCC): Status: ACTIVE | Noted: 2022-03-17

## 2022-03-17 LAB
ALBUMIN SERPL-MCNC: 2.9 GM/DL (ref 3.4–5)
ALP BLD-CCNC: 129 IU/L (ref 40–129)
ALT SERPL-CCNC: 14 U/L (ref 10–40)
ANION GAP SERPL CALCULATED.3IONS-SCNC: 16 MMOL/L (ref 4–16)
ANION GAP SERPL CALCULATED.3IONS-SCNC: 17 MMOL/L (ref 4–16)
APTT: 33.1 SECONDS (ref 25.1–37.1)
AST SERPL-CCNC: 17 IU/L (ref 15–37)
BACTERIA: ABNORMAL /HPF
BANDED NEUTROPHILS ABSOLUTE COUNT: 1.11 K/CU MM
BANDED NEUTROPHILS ABSOLUTE COUNT: 2.15 K/CU MM
BANDED NEUTROPHILS RELATIVE PERCENT: 15 % (ref 5–11)
BANDED NEUTROPHILS RELATIVE PERCENT: 8 % (ref 5–11)
BILIRUB SERPL-MCNC: 0.5 MG/DL (ref 0–1)
BILIRUBIN DIRECT: 0.2 MG/DL (ref 0–0.3)
BILIRUBIN URINE: NEGATIVE MG/DL
BILIRUBIN, INDIRECT: 0.3 MG/DL (ref 0–0.7)
BLOOD, URINE: ABNORMAL
BUN BLDV-MCNC: 39 MG/DL (ref 6–23)
BUN BLDV-MCNC: 48 MG/DL (ref 6–23)
CALCIUM SERPL-MCNC: 8.7 MG/DL (ref 8.3–10.6)
CALCIUM SERPL-MCNC: 9.3 MG/DL (ref 8.3–10.6)
CHLORIDE BLD-SCNC: 100 MMOL/L (ref 99–110)
CHLORIDE BLD-SCNC: 105 MMOL/L (ref 99–110)
CLARITY: ABNORMAL
CO2: 16 MMOL/L (ref 21–32)
CO2: 20 MMOL/L (ref 21–32)
COLOR: YELLOW
CREAT SERPL-MCNC: 2.6 MG/DL (ref 0.9–1.3)
CREAT SERPL-MCNC: 3.4 MG/DL (ref 0.9–1.3)
DIFFERENTIAL TYPE: ABNORMAL
DIFFERENTIAL TYPE: ABNORMAL
EKG ATRIAL RATE: 98 BPM
EKG DIAGNOSIS: NORMAL
EKG P AXIS: 53 DEGREES
EKG P-R INTERVAL: 206 MS
EKG Q-T INTERVAL: 326 MS
EKG QRS DURATION: 72 MS
EKG QTC CALCULATION (BAZETT): 416 MS
EKG R AXIS: 9 DEGREES
EKG T AXIS: 44 DEGREES
EKG VENTRICULAR RATE: 98 BPM
GFR AFRICAN AMERICAN: 21 ML/MIN/1.73M2
GFR AFRICAN AMERICAN: 29 ML/MIN/1.73M2
GFR NON-AFRICAN AMERICAN: 17 ML/MIN/1.73M2
GFR NON-AFRICAN AMERICAN: 24 ML/MIN/1.73M2
GLUCOSE BLD-MCNC: 149 MG/DL (ref 70–99)
GLUCOSE BLD-MCNC: 182 MG/DL (ref 70–99)
GLUCOSE BLD-MCNC: 199 MG/DL (ref 70–99)
GLUCOSE BLD-MCNC: 249 MG/DL (ref 70–99)
GLUCOSE BLD-MCNC: 262 MG/DL (ref 70–99)
GLUCOSE BLD-MCNC: 276 MG/DL (ref 70–99)
GLUCOSE BLD-MCNC: 310 MG/DL (ref 70–99)
GLUCOSE BLD-MCNC: 338 MG/DL (ref 70–99)
GLUCOSE, URINE: NEGATIVE MG/DL
HCT VFR BLD CALC: 31.5 % (ref 42–52)
HCT VFR BLD CALC: 37.2 % (ref 42–52)
HEMOGLOBIN: 11 GM/DL (ref 13.5–18)
HEMOGLOBIN: 9.4 GM/DL (ref 13.5–18)
INR BLD: 1.2 INDEX
KETONES, URINE: ABNORMAL MG/DL
LEUKOCYTE ESTERASE, URINE: ABNORMAL
LIPASE: 20 IU/L (ref 13–60)
LYMPHOCYTES ABSOLUTE: 0.9 K/CU MM
LYMPHOCYTES ABSOLUTE: 1.5 K/CU MM
LYMPHOCYTES RELATIVE PERCENT: 11 % (ref 24–44)
LYMPHOCYTES RELATIVE PERCENT: 6 % (ref 24–44)
MCH RBC QN AUTO: 29.5 PG (ref 27–31)
MCH RBC QN AUTO: 30.1 PG (ref 27–31)
MCHC RBC AUTO-ENTMCNC: 29.6 % (ref 32–36)
MCHC RBC AUTO-ENTMCNC: 29.8 % (ref 32–36)
MCV RBC AUTO: 101.6 FL (ref 78–100)
MCV RBC AUTO: 98.7 FL (ref 78–100)
METAMYELOCYTES ABSOLUTE COUNT: 0.57 K/CU MM
METAMYELOCYTES PERCENT: 4 %
MONOCYTES ABSOLUTE: 0.4 K/CU MM
MONOCYTES ABSOLUTE: 0.7 K/CU MM
MONOCYTES RELATIVE PERCENT: 3 % (ref 0–4)
MONOCYTES RELATIVE PERCENT: 5 % (ref 0–4)
MUCUS: ABNORMAL HPF
NITRITE URINE, QUANTITATIVE: NEGATIVE
PDW BLD-RTO: 15.9 % (ref 11.7–14.9)
PDW BLD-RTO: 16.1 % (ref 11.7–14.9)
PH, URINE: 7.5 (ref 5–8)
PLATELET # BLD: 154 K/CU MM (ref 140–440)
PLATELET # BLD: 164 K/CU MM (ref 140–440)
PMV BLD AUTO: 12.2 FL (ref 7.5–11.1)
PMV BLD AUTO: 12.4 FL (ref 7.5–11.1)
POTASSIUM SERPL-SCNC: 4.1 MMOL/L (ref 3.5–5.1)
POTASSIUM SERPL-SCNC: 4.6 MMOL/L (ref 3.5–5.1)
PRO-BNP: 5923 PG/ML
PROTEIN UA: 300 MG/DL
PROTHROMBIN TIME: 15.5 SECONDS (ref 11.7–14.5)
RAPID INFLUENZA  B AGN: NEGATIVE
RAPID INFLUENZA A AGN: NEGATIVE
RBC # BLD: 3.19 M/CU MM (ref 4.6–6.2)
RBC # BLD: 3.66 M/CU MM (ref 4.6–6.2)
RBC URINE: ABNORMAL /HPF (ref 0–3)
SARS-COV-2, NAAT: NOT DETECTED
SEGMENTED NEUTROPHILS ABSOLUTE COUNT: 10.3 K/CU MM
SEGMENTED NEUTROPHILS ABSOLUTE COUNT: 10.6 K/CU MM
SEGMENTED NEUTROPHILS RELATIVE PERCENT: 72 % (ref 36–66)
SEGMENTED NEUTROPHILS RELATIVE PERCENT: 76 % (ref 36–66)
SODIUM BLD-SCNC: 133 MMOL/L (ref 135–145)
SODIUM BLD-SCNC: 141 MMOL/L (ref 135–145)
SOURCE: NORMAL
SPECIFIC GRAVITY UA: 1.01 (ref 1–1.03)
SQUAMOUS EPITHELIAL: 4 /HPF
TOTAL PROTEIN: 7 GM/DL (ref 6.4–8.2)
TROPONIN T: 0.04 NG/ML
UNCLASSIFIED CAST: 39 /LPF
UROBILINOGEN, URINE: 0.2 MG/DL (ref 0.2–1)
WBC # BLD: 13.9 K/CU MM (ref 4–10.5)
WBC # BLD: 14.3 K/CU MM (ref 4–10.5)
WBC CLUMP: ABNORMAL /HPF
WBC UA: 344 /HPF (ref 0–2)

## 2022-03-17 PROCEDURE — 74176 CT ABD & PELVIS W/O CONTRAST: CPT

## 2022-03-17 PROCEDURE — 85610 PROTHROMBIN TIME: CPT

## 2022-03-17 PROCEDURE — 81001 URINALYSIS AUTO W/SCOPE: CPT

## 2022-03-17 PROCEDURE — 2580000003 HC RX 258: Performed by: ANESTHESIOLOGY

## 2022-03-17 PROCEDURE — 85027 COMPLETE CBC AUTOMATED: CPT

## 2022-03-17 PROCEDURE — 3600000003 HC SURGERY LEVEL 3 BASE: Performed by: UROLOGY

## 2022-03-17 PROCEDURE — C1769 GUIDE WIRE: HCPCS | Performed by: UROLOGY

## 2022-03-17 PROCEDURE — 87804 INFLUENZA ASSAY W/OPTIC: CPT

## 2022-03-17 PROCEDURE — C2617 STENT, NON-COR, TEM W/O DEL: HCPCS | Performed by: UROLOGY

## 2022-03-17 PROCEDURE — 6360000002 HC RX W HCPCS: Performed by: EMERGENCY MEDICINE

## 2022-03-17 PROCEDURE — 80048 BASIC METABOLIC PNL TOTAL CA: CPT

## 2022-03-17 PROCEDURE — 0T778DZ DILATION OF LEFT URETER WITH INTRALUMINAL DEVICE, VIA NATURAL OR ARTIFICIAL OPENING ENDOSCOPIC: ICD-10-PCS | Performed by: UROLOGY

## 2022-03-17 PROCEDURE — 93010 ELECTROCARDIOGRAM REPORT: CPT | Performed by: INTERNAL MEDICINE

## 2022-03-17 PROCEDURE — 2580000003 HC RX 258: Performed by: UROLOGY

## 2022-03-17 PROCEDURE — 6360000002 HC RX W HCPCS: Performed by: INTERNAL MEDICINE

## 2022-03-17 PROCEDURE — 2580000003 HC RX 258: Performed by: INTERNAL MEDICINE

## 2022-03-17 PROCEDURE — 3700000001 HC ADD 15 MINUTES (ANESTHESIA): Performed by: UROLOGY

## 2022-03-17 PROCEDURE — 6360000002 HC RX W HCPCS: Performed by: NURSE ANESTHETIST, CERTIFIED REGISTERED

## 2022-03-17 PROCEDURE — 3600000013 HC SURGERY LEVEL 3 ADDTL 15MIN: Performed by: UROLOGY

## 2022-03-17 PROCEDURE — 76000 FLUOROSCOPY <1 HR PHYS/QHP: CPT

## 2022-03-17 PROCEDURE — 36415 COLL VENOUS BLD VENIPUNCTURE: CPT

## 2022-03-17 PROCEDURE — 87150 DNA/RNA AMPLIFIED PROBE: CPT

## 2022-03-17 PROCEDURE — C1758 CATHETER, URETERAL: HCPCS | Performed by: UROLOGY

## 2022-03-17 PROCEDURE — 2140000000 HC CCU INTERMEDIATE R&B

## 2022-03-17 PROCEDURE — 94761 N-INVAS EAR/PLS OXIMETRY MLT: CPT

## 2022-03-17 PROCEDURE — 85007 BL SMEAR W/DIFF WBC COUNT: CPT

## 2022-03-17 PROCEDURE — 87186 SC STD MICRODIL/AGAR DIL: CPT

## 2022-03-17 PROCEDURE — 2580000003 HC RX 258: Performed by: EMERGENCY MEDICINE

## 2022-03-17 PROCEDURE — 82962 GLUCOSE BLOOD TEST: CPT

## 2022-03-17 PROCEDURE — 0TP98DZ REMOVAL OF INTRALUMINAL DEVICE FROM URETER, VIA NATURAL OR ARTIFICIAL OPENING ENDOSCOPIC: ICD-10-PCS | Performed by: UROLOGY

## 2022-03-17 PROCEDURE — 2709999900 HC NON-CHARGEABLE SUPPLY: Performed by: UROLOGY

## 2022-03-17 PROCEDURE — 87086 URINE CULTURE/COLONY COUNT: CPT

## 2022-03-17 PROCEDURE — 87635 SARS-COV-2 COVID-19 AMP PRB: CPT

## 2022-03-17 PROCEDURE — 87040 BLOOD CULTURE FOR BACTERIA: CPT

## 2022-03-17 PROCEDURE — 70450 CT HEAD/BRAIN W/O DYE: CPT

## 2022-03-17 PROCEDURE — 85730 THROMBOPLASTIN TIME PARTIAL: CPT

## 2022-03-17 PROCEDURE — 3700000000 HC ANESTHESIA ATTENDED CARE: Performed by: UROLOGY

## 2022-03-17 PROCEDURE — 6370000000 HC RX 637 (ALT 250 FOR IP): Performed by: EMERGENCY MEDICINE

## 2022-03-17 PROCEDURE — 6370000000 HC RX 637 (ALT 250 FOR IP): Performed by: INTERNAL MEDICINE

## 2022-03-17 PROCEDURE — 83880 ASSAY OF NATRIURETIC PEPTIDE: CPT

## 2022-03-17 DEVICE — VARIABLE LENGTH URETERAL STENT
Type: IMPLANTABLE DEVICE | Site: URETER | Status: FUNCTIONAL
Brand: CONTOUR VL™

## 2022-03-17 RX ORDER — SODIUM CHLORIDE 9 MG/ML
25 INJECTION, SOLUTION INTRAVENOUS PRN
Status: DISCONTINUED | OUTPATIENT
Start: 2022-03-17 | End: 2022-03-30 | Stop reason: HOSPADM

## 2022-03-17 RX ORDER — ALLOPURINOL 100 MG/1
100 TABLET ORAL DAILY
Status: DISCONTINUED | OUTPATIENT
Start: 2022-03-17 | End: 2022-03-30 | Stop reason: HOSPADM

## 2022-03-17 RX ORDER — SODIUM CHLORIDE 0.9 % (FLUSH) 0.9 %
5-40 SYRINGE (ML) INJECTION PRN
Status: DISCONTINUED | OUTPATIENT
Start: 2022-03-17 | End: 2022-03-30 | Stop reason: HOSPADM

## 2022-03-17 RX ORDER — INSULIN GLARGINE 100 [IU]/ML
10 INJECTION, SOLUTION SUBCUTANEOUS NIGHTLY
Status: DISCONTINUED | OUTPATIENT
Start: 2022-03-17 | End: 2022-03-21

## 2022-03-17 RX ORDER — SODIUM CHLORIDE, SODIUM LACTATE, POTASSIUM CHLORIDE, CALCIUM CHLORIDE 600; 310; 30; 20 MG/100ML; MG/100ML; MG/100ML; MG/100ML
INJECTION, SOLUTION INTRAVENOUS CONTINUOUS
Status: DISCONTINUED | OUTPATIENT
Start: 2022-03-17 | End: 2022-03-17

## 2022-03-17 RX ORDER — ONDANSETRON 4 MG/1
4 TABLET, ORALLY DISINTEGRATING ORAL EVERY 8 HOURS PRN
Status: DISCONTINUED | OUTPATIENT
Start: 2022-03-17 | End: 2022-03-30 | Stop reason: HOSPADM

## 2022-03-17 RX ORDER — ALLOPURINOL 100 MG/1
100 TABLET ORAL DAILY
Status: ON HOLD | COMMUNITY

## 2022-03-17 RX ORDER — HEPARIN SODIUM 5000 [USP'U]/ML
5000 INJECTION, SOLUTION INTRAVENOUS; SUBCUTANEOUS EVERY 8 HOURS SCHEDULED
Status: DISCONTINUED | OUTPATIENT
Start: 2022-03-17 | End: 2022-03-30 | Stop reason: HOSPADM

## 2022-03-17 RX ORDER — ATORVASTATIN CALCIUM 80 MG/1
80 TABLET, FILM COATED ORAL NIGHTLY
Status: DISCONTINUED | OUTPATIENT
Start: 2022-03-17 | End: 2022-03-21

## 2022-03-17 RX ORDER — DEXTROSE MONOHYDRATE 25 G/50ML
12.5 INJECTION, SOLUTION INTRAVENOUS PRN
Status: DISCONTINUED | OUTPATIENT
Start: 2022-03-17 | End: 2022-03-21 | Stop reason: SDUPTHER

## 2022-03-17 RX ORDER — ATORVASTATIN CALCIUM 80 MG/1
80 TABLET, FILM COATED ORAL DAILY
Status: ON HOLD | COMMUNITY
End: 2022-04-14 | Stop reason: HOSPADM

## 2022-03-17 RX ORDER — 0.9 % SODIUM CHLORIDE 0.9 %
500 INTRAVENOUS SOLUTION INTRAVENOUS ONCE
Status: COMPLETED | OUTPATIENT
Start: 2022-03-17 | End: 2022-03-17

## 2022-03-17 RX ORDER — ACETAMINOPHEN 650 MG/1
650 SUPPOSITORY RECTAL EVERY 6 HOURS PRN
Status: DISCONTINUED | OUTPATIENT
Start: 2022-03-17 | End: 2022-03-30 | Stop reason: HOSPADM

## 2022-03-17 RX ORDER — DEXTROSE MONOHYDRATE 50 MG/ML
100 INJECTION, SOLUTION INTRAVENOUS PRN
Status: DISCONTINUED | OUTPATIENT
Start: 2022-03-17 | End: 2022-03-21 | Stop reason: SDUPTHER

## 2022-03-17 RX ORDER — SODIUM CHLORIDE 0.9 % (FLUSH) 0.9 %
5-40 SYRINGE (ML) INJECTION EVERY 12 HOURS SCHEDULED
Status: DISCONTINUED | OUTPATIENT
Start: 2022-03-17 | End: 2022-03-30 | Stop reason: HOSPADM

## 2022-03-17 RX ORDER — SODIUM CHLORIDE 450 MG/100ML
INJECTION, SOLUTION INTRAVENOUS CONTINUOUS
Status: DISCONTINUED | OUTPATIENT
Start: 2022-03-17 | End: 2022-03-18

## 2022-03-17 RX ORDER — PREGABALIN 50 MG/1
50 CAPSULE ORAL 2 TIMES DAILY
Status: ON HOLD | COMMUNITY
End: 2022-04-14 | Stop reason: SDUPTHER

## 2022-03-17 RX ORDER — LIDOCAINE HYDROCHLORIDE 20 MG/ML
INJECTION, SOLUTION INTRAVENOUS PRN
Status: DISCONTINUED | OUTPATIENT
Start: 2022-03-17 | End: 2022-03-17 | Stop reason: SDUPTHER

## 2022-03-17 RX ORDER — FUROSEMIDE 10 MG/ML
20 INJECTION INTRAMUSCULAR; INTRAVENOUS ONCE
Status: COMPLETED | OUTPATIENT
Start: 2022-03-17 | End: 2022-03-17

## 2022-03-17 RX ORDER — ACETAMINOPHEN 650 MG/1
650 SUPPOSITORY RECTAL ONCE
Status: COMPLETED | OUTPATIENT
Start: 2022-03-17 | End: 2022-03-17

## 2022-03-17 RX ORDER — PREGABALIN 50 MG/1
50 CAPSULE ORAL 2 TIMES DAILY
Status: DISCONTINUED | OUTPATIENT
Start: 2022-03-17 | End: 2022-03-30 | Stop reason: HOSPADM

## 2022-03-17 RX ORDER — ACETAMINOPHEN 325 MG/1
650 TABLET ORAL EVERY 6 HOURS PRN
Status: DISCONTINUED | OUTPATIENT
Start: 2022-03-17 | End: 2022-03-30 | Stop reason: HOSPADM

## 2022-03-17 RX ORDER — INSULIN GLARGINE 100 [IU]/ML
20 INJECTION, SOLUTION SUBCUTANEOUS EVERY MORNING
Status: DISCONTINUED | OUTPATIENT
Start: 2022-03-17 | End: 2022-03-21

## 2022-03-17 RX ORDER — POLYETHYLENE GLYCOL 3350 17 G/17G
17 POWDER, FOR SOLUTION ORAL DAILY PRN
Status: DISCONTINUED | OUTPATIENT
Start: 2022-03-17 | End: 2022-03-30 | Stop reason: HOSPADM

## 2022-03-17 RX ORDER — AMLODIPINE BESYLATE 10 MG/1
10 TABLET ORAL DAILY
Status: DISCONTINUED | OUTPATIENT
Start: 2022-03-17 | End: 2022-03-24

## 2022-03-17 RX ORDER — ONDANSETRON 2 MG/ML
4 INJECTION INTRAMUSCULAR; INTRAVENOUS EVERY 6 HOURS PRN
Status: DISCONTINUED | OUTPATIENT
Start: 2022-03-17 | End: 2022-03-30 | Stop reason: HOSPADM

## 2022-03-17 RX ORDER — PROPOFOL 10 MG/ML
INJECTION, EMULSION INTRAVENOUS PRN
Status: DISCONTINUED | OUTPATIENT
Start: 2022-03-17 | End: 2022-03-17 | Stop reason: SDUPTHER

## 2022-03-17 RX ORDER — NICOTINE POLACRILEX 4 MG
15 LOZENGE BUCCAL PRN
Status: DISCONTINUED | OUTPATIENT
Start: 2022-03-17 | End: 2022-03-21 | Stop reason: SDUPTHER

## 2022-03-17 RX ADMIN — SODIUM CHLORIDE, POTASSIUM CHLORIDE, SODIUM LACTATE AND CALCIUM CHLORIDE: 600; 310; 30; 20 INJECTION, SOLUTION INTRAVENOUS at 15:10

## 2022-03-17 RX ADMIN — FUROSEMIDE 20 MG: 10 INJECTION INTRAMUSCULAR; INTRAVENOUS at 03:07

## 2022-03-17 RX ADMIN — PHENYLEPHRINE HYDROCHLORIDE 100 MCG: 10 INJECTION INTRAVENOUS at 15:59

## 2022-03-17 RX ADMIN — SODIUM CHLORIDE, PRESERVATIVE FREE 10 ML: 5 INJECTION INTRAVENOUS at 21:13

## 2022-03-17 RX ADMIN — INSULIN LISPRO 4 UNITS: 100 INJECTION, SOLUTION INTRAVENOUS; SUBCUTANEOUS at 12:01

## 2022-03-17 RX ADMIN — SODIUM CHLORIDE 500 ML: 9 INJECTION, SOLUTION INTRAVENOUS at 01:32

## 2022-03-17 RX ADMIN — SODIUM CHLORIDE: 4.5 INJECTION, SOLUTION INTRAVENOUS at 17:33

## 2022-03-17 RX ADMIN — ALLOPURINOL 100 MG: 100 TABLET ORAL at 12:00

## 2022-03-17 RX ADMIN — MEROPENEM 1000 MG: 1 INJECTION, POWDER, FOR SOLUTION INTRAVENOUS at 03:10

## 2022-03-17 RX ADMIN — SODIUM CHLORIDE, PRESERVATIVE FREE 10 ML: 5 INJECTION INTRAVENOUS at 09:32

## 2022-03-17 RX ADMIN — MEROPENEM 1000 MG: 1 INJECTION, POWDER, FOR SOLUTION INTRAVENOUS at 15:52

## 2022-03-17 RX ADMIN — INSULIN GLARGINE 10 UNITS: 100 INJECTION, SOLUTION SUBCUTANEOUS at 21:12

## 2022-03-17 RX ADMIN — INSULIN LISPRO 2 UNITS: 100 INJECTION, SOLUTION INTRAVENOUS; SUBCUTANEOUS at 17:35

## 2022-03-17 RX ADMIN — HEPARIN SODIUM 5000 UNITS: 5000 INJECTION INTRAVENOUS; SUBCUTANEOUS at 21:13

## 2022-03-17 RX ADMIN — ACETAMINOPHEN 650 MG: 650 SUPPOSITORY RECTAL at 03:08

## 2022-03-17 RX ADMIN — INSULIN HUMAN 6 UNITS: 100 INJECTION, SOLUTION PARENTERAL at 03:55

## 2022-03-17 RX ADMIN — PHENYLEPHRINE HYDROCHLORIDE 100 MCG: 10 INJECTION INTRAVENOUS at 16:03

## 2022-03-17 RX ADMIN — LIDOCAINE HYDROCHLORIDE 100 MG: 20 INJECTION, SOLUTION INTRAVENOUS at 15:48

## 2022-03-17 RX ADMIN — ONDANSETRON 4 MG: 2 INJECTION INTRAMUSCULAR; INTRAVENOUS at 21:11

## 2022-03-17 RX ADMIN — PHENYLEPHRINE HYDROCHLORIDE 100 MCG: 10 INJECTION INTRAVENOUS at 16:13

## 2022-03-17 RX ADMIN — ATORVASTATIN CALCIUM 80 MG: 80 TABLET, FILM COATED ORAL at 21:13

## 2022-03-17 RX ADMIN — AMLODIPINE BESYLATE 10 MG: 10 TABLET ORAL at 12:00

## 2022-03-17 RX ADMIN — SODIUM CHLORIDE, POTASSIUM CHLORIDE, SODIUM LACTATE AND CALCIUM CHLORIDE: 600; 310; 30; 20 INJECTION, SOLUTION INTRAVENOUS at 21:19

## 2022-03-17 RX ADMIN — INSULIN LISPRO 6 UNITS: 100 INJECTION, SOLUTION INTRAVENOUS; SUBCUTANEOUS at 09:35

## 2022-03-17 RX ADMIN — PROPOFOL 240 MG: 10 INJECTION, EMULSION INTRAVENOUS at 15:48

## 2022-03-17 RX ADMIN — INSULIN GLARGINE 20 UNITS: 100 INJECTION, SOLUTION SUBCUTANEOUS at 12:01

## 2022-03-17 ASSESSMENT — PULMONARY FUNCTION TESTS
PIF_VALUE: 0
PIF_VALUE: 1
PIF_VALUE: 0
PIF_VALUE: 1
PIF_VALUE: 0
PIF_VALUE: 1
PIF_VALUE: 1
PIF_VALUE: 0

## 2022-03-17 ASSESSMENT — PAIN SCALES - GENERAL
PAINLEVEL_OUTOF10: 0
PAINLEVEL_OUTOF10: 0

## 2022-03-17 NOTE — ANESTHESIA POSTPROCEDURE EVALUATION
Department of Anesthesiology  Postprocedure Note    Patient: Whit Kraus  MRN: [id number]  YOB: 1940  Date of evaluation: 3/17/2022  Time:  4:34 PM     Procedure Summary     Date: 03/17/22 Room / Location: [address]    Anesthesia Start: [address] Anesthesia Stop: 6423    Procedure: CYSTOSCOPY LEFT STENT EXCHANGE (Left Ureter) Diagnosis: (flank pain)    Surgeons: Butch Casper MD Responsible Provider: Liam Haywood DO    Anesthesia Type: MAC ASA Status: 3 - Emergent          Anesthesia Type: MAC    Jackeline Phase I:      Jackeline Phase II:      Last vitals: Reviewed and per EMR flowsheets.        Anesthesia Post Evaluation    Patient location during evaluation: floor  Patient participation: complete - patient participated  Level of consciousness: awake and alert  Pain score: 0  Airway patency: patent  Nausea & Vomiting: no vomiting and no nausea  Complications: no  Cardiovascular status: blood pressure returned to baseline and hemodynamically stable  Respiratory status: acceptable, room air, spontaneous ventilation and nonlabored ventilation  Hydration status: stable

## 2022-03-17 NOTE — H&P
History and Physical      Name:  Socorro Nuñez /Age/Sex: 1940  (78 y.o. male)   MRN & CSN:  [id number] & [id number] Encounter Date/Time: 3/17/2022 3:29 AM EDT   Location:  [place] [address] PCP: Chris Juarez MD       Hospital Day: 2    Assessment and Plan:     #. Sepsis secondary to UTI  -Temp 101.4, tachycardia, leukocytosis, UA suggestive of infection  -Continue antibiotics and monitor for improvement. #.  Complicated UTI  -Patient has suprapubic catheter. Exchanged in ER. -Urine culture, blood culture sent from ER.  -History of ESBL Klebsiella pneumoniae-2021.  -Continue meropenem  -Consult urology. #.  Acute metabolic encephalopathy: Secondary to above    #. Chronically elevated troponin    #.  Mild PAMELLA on CKD  -Creatinine 2-2022, 2.6 today.  -Patient received fluid bolus in ER  -Monitor with repeat BMP. #.  Anion gap metabolic acidosis  -Probably secondary to worsening renal function    #. Elevated proBNP  -Patient does not have a history of CHF, does not look fluid overloaded. #.  Hypertension  -Patient on amlodipine at home    #. Uncontrolled diabetes mellitus type 2, on long-term insulin  -Patient is on glimepiride, Levemir 40 units a.m., 10 units nightly  -Continue Lantus 20 units every morning, 10 units nightly, insulin sliding scale with hypoglycemia protocol. #.  Diabetic neuropathy -continue gabapentin    #. Chronic anemia    #. Gout-on allopurinol    #. CKD stage III    #. History of prostate cancer  -Has suprapubic catheter    #. History of moderate to severe left hydronephrosis/hydroureter s/p left ureteral stent placement-2021. #.  History of CVA-right MCA infarct with left-sided weakness-2021.  -MRI head was nonacute-CT cerebral perfusion-hypoperfusion in the right MCA territory.  -Continue aspirin, statin. #.  History of COVID-.     Disposition:   Current Living situation: home    Diet  n.p.o. until evaluated by urology   DVT Prophylaxis [] Lovenox, [x]  Heparin, [] SCDs, [] Ambulation,  [] Eliquis, [] Xarelto   Code Status Prior   Surrogate Decision Maker/ POA      History from:   EMR, patient. History of Present Illness:     Chief Complaint: Sepsis secondary to UTI [facility])  Kimberly Herbert is a 78 y.o. male with hypertension, diabetes mellitus type 2, on long-term insulin, diabetic neuropathy, gout, CKD stage III, history of prostate cancer, status post suprapubic catheter, history of ureteral stent, history of CVA, history of COVID-19-1/2022 was brought to ED by EMS after the patient was found to be confused at home. Patient is currently sleeping, arousable, reported that he is feeling better, knew that he was in the ER, name of the hospital, did not answer any further questions. Son at bedside provided most of the information. Patient lives with his son, who noticed that the patient was getting more confused since yesterday morning. He denied patient having any fever at home. Patient had a nausea, vomiting. Patient denied any chest pain, shortness of breath, denied any abdominal pain. At presentation patient was noted to have /65, , RR 20, temp 101.4, saturating 95% on room air. Lab work significant for sodium 133, CO2 16, creatinine 2.6, anion gap 17, random glucose 310, proBNP 5923, troponin 0 0.044, albumin 2.9, WBC 14.3, hemoglobin 11, platelets 432. UA-turbid, large leukocyte esterase, nitrite negative, , WBC clumps many. Chest x-ray reported as worsening pulmonary edema. CT abdomen/pelvis-ordered in ER-left-sided hydronephrosis and hydroureter despite the presence of left ureteral stent, increased left perinephric stranding and fluid compared to the right. Patient received 500 cc bolus in ER, IV meropenem, regular insulin 6 units IV. Patient was also given 20 mg of IV Lasix in ED-given elevated proBNP, chest x-ray findings.     Review of Systems: Need 10 Elements   10 point review of systems conducted and pertinent positives and negatives as per HPI. Objective:   No intake or output data in the 24 hours ending 03/17/22 0329   Vitals:   Vitals:    03/17/22 0102 03/17/22 0111 03/17/22 0131 03/17/22 0201   BP: 139/64  (!) 127/59    Pulse:  95  94   Resp:    16   Temp:       TempSrc:       SpO2: 97%  96%    Weight:           Medications Prior to Admission   Reviewed medications with patient    Prior to Admission medications    Medication Sig Start Date End Date Taking? Authorizing Provider   albuterol sulfate HFA (PROVENTIL HFA) 108 (90 Base) MCG/ACT inhaler Inhale 2 puffs into the lungs every 4 hours as needed for Wheezing or Shortness of Breath With spacer (and mask if indicated). Thanks. 1/6/22 2/5/22  Chapin Morales, DO   acetaminophen (TYLENOL) 325 MG tablet Take 2 tablets by mouth every 6 hours as needed for Pain 1/6/22   Chapinclara Morales, DO   hydrALAZINE (APRESOLINE) 25 MG tablet Take 1 tablet by mouth every 8 hours 11/26/21   GLEN Justin MD   labetalol (NORMODYNE) 200 MG tablet Take 1 tablet by mouth every 12 hours 11/26/21   GLEN Justin MD   amLODIPine (NORVASC) 10 MG tablet Take 1 tablet by mouth daily 11/27/21   GLEN Justin MD   oxybutynin (DITROPAN-XL) 5 MG extended release tablet Take 1 tablet by mouth daily for 7 days 6/18/20 6/25/20  John Lu PA-C   lisinopril (PRINIVIL;ZESTRIL) 5 MG tablet Take 1 tablet by mouth daily 5/7/20   Michaela Morrison MD   Insulin Detemir (LEVEMIR SC) Inject 40 Units into the skin In the morning    Historical Provider, MD   Insulin Detemir (LEVEMIR FLEXPEN SC) Inject 10 Units into the skin nightly    Historical Provider, MD   docusate sodium (COLACE, DULCOLAX) 100 MG CAPS Take 100 mg by mouth 2 times daily as needed for Constipation. 12/14/14   Mitch Hsu MD   glimepiride (AMARYL) 4 MG tablet Take 4 mg by mouth every morning (before breakfast).     Historical Provider, MD       Physical Exam: Need 8 Elements   Physical Exam     GEN -sleeping, arousable, answers questions appropriately. Jenny Sida EYES   -PERRL. HENT  -MM are dry. RESP  -LS CTA equal bilat, no wheezes, rales or rhonchi. Symmetric chest movement. No respiratory distress noted. C/V  -S1/S2 auscultated, tachycardia without appreciable M/R/G. No JVD or carotid bruits. Peripheral pulses equal bilaterally and palpable. No peripheral edema. No reproducible chest wall tenderness. GI  -Abdomen is soft, non-distended, no significant tenderness. No masses or guarding. + BS in all quadrants. Rectal exam deferred.   -No CVA tenderness. Hopper catheter is not present. MS  -B/L extremities strong muscles strength. Full movements. No gross joint deformities. No swelling, intact sensation symmetrical.   SKIN  -Normal coloration, warm, dry. NEURO  - Awake, alert, oriented x 3, no focal deficits. PSYC  - Appropriate affect. Past Medical History:   Reviewed patient's past medical, surgical, social, family history and allergies. PMHx   Past Medical History:   Diagnosis Date    Acute urinary tract infection 3/15/2012    Ataxia 2010    Diabetes mellitus ([facility] [address] 2011    type 2, controlled    Fusion of spine of cervical region 10/2012    Gait disturbance 2011    History of prostate cancer 1996    adenocarcinoma    History of tobacco use 1959    Hyperlipidemia 2011    Osteoarthritis 2011     PSHX:  has a past surgical history that includes Prostate surgery; other surgical history (3/28/13); Colon surgery; Bladder surgery; and Prostatectomy (1996). Allergies: No Known Allergies  Fam HX: family history includes Cancer in his brother, maternal uncle, and mother; Diabetes in his brother, father, maternal grandmother, and sister; Heart Disease in his father; High Blood Pressure in his father and sister; Stroke in his maternal grandfather.   Soc HX:   Social History     Socioeconomic History    Marital status:      Spouse name: None    Number of children: 3    Years of education: None    Highest education level: None   Occupational History    None   Tobacco Use    Smoking status: Former Smoker     Packs/day: 0.50     Quit date: 1976     Years since quittin.7    Smokeless tobacco: Never Used   Vaping Use    Vaping Use: None   Substance and Sexual Activity    Alcohol use: No     Comment: Quit in     Drug use: No    Sexual activity: None   Other Topics Concern    None   Social History Narrative    None     Social Determinants of Health     Financial Resource Strain:     Difficulty of Paying Living Expenses: Not on file   Food Insecurity:     Worried About Running Out of Food in the Last Year: Not on file    [place] of Food in the Last Year: Not on file   Transportation Needs:     Lack of Transportation (Medical): Not on file    Lack of Transportation (Non-Medical):  Not on file   Physical Activity:     Days of Exercise per Week: Not on file    Minutes of Exercise per Session: Not on file   Stress:     Feeling of Stress : Not on file   Social Connections:     Frequency of Communication with Friends and Family: Not on file    Frequency of Social Gatherings with Friends and Family: Not on file    Attends [employer] Services: Not on file    Active Member of [address] [place] [address] [employer] or Organizations: Not on file    Attends Club or Organization Meetings: Not on file    Marital Status: Not on file   Intimate Partner Violence:     Fear of Current or Ex-Partner: Not on file    Emotionally Abused: Not on file    Physically Abused: Not on file    Sexually Abused: Not on file   Housing Stability:     Unable to Pay for Housing in the Last Year: Not on file    Number of [place] in the Last Year: Not on file    Unstable Housing in the Last Year: Not on file       Medications:   Medications:    meropenem  1,000 mg IntraVENous Once    insulin regular  6 Units IntraVENous Once      Infusions:   PRN Meds:      Labs      CBC:   Recent Labs     22  2356   WBC 14.3* HGB 11.0*        BMP:    Recent Labs     03/16/22  2356   *   K 4.1      CO2 16*   BUN 39*   CREATININE 2.6*   GLUCOSE 310*     Hepatic:   Recent Labs     03/16/22  2356   AST 17   ALT 14   BILITOT 0.5   ALKPHOS 129     Lipids: No results found for: CHOL, HDL, TRIG  Hemoglobin A1C:   Lab Results   Component Value Date    LABA1C 9.3 11/19/2021     TSH: No results found for: TSH  Troponin:   Lab Results   Component Value Date    TROPONINT 0.044 03/16/2022    TROPONINT 0.045 01/06/2022    TROPONINT 0.029 11/16/2021     Lactic Acid: No results for input(s): LACTA in the last 72 hours. BNP:   Recent Labs     03/16/22  2356   PROBNP 5,923*     UA:  Lab Results   Component Value Date    NITRU NEGATIVE 03/17/2022    NITRU NEGATIVE 03/23/2013    COLORU YELLOW 03/17/2022    WBCUA 344 03/17/2022    RBCUA NONE SEEN 03/17/2022    MUCUS FEW 03/17/2022    TRICHOMONAS NONE SEEN 01/06/2022    YEAST MANY 02/11/2018    BACTERIA MODERATE 03/17/2022    CLARITYU TURBID 03/17/2022    SPECGRAV 1.010 03/17/2022    LEUKOCYTESUR LARGE NUMBER OR AMOUNT OF  03/17/2022    UROBILINOGEN 0.2 03/17/2022    BILIRUBINUR NEGATIVE 03/17/2022    BLOODU MODERATE 03/17/2022    GLUCOSEU 1,000 03/23/2013    KETUA TRACE 03/17/2022    AMORPHOUS RARE 11/16/2021     Urine Cultures: No results found for: LABURIN  Blood Cultures: No results found for: BC  No results found for: BLOODCULT2  Organism:   Lab Results   Component Value Date    ORG ENTBC 02/11/2018       Imaging/Diagnostics Last 24 Hours   CT HEAD WO CONTRAST    Result Date: 3/17/2022  EXAMINATION: CT OF THE HEAD WITHOUT CONTRAST  3/17/2022 12:19 am TECHNIQUE: CT of the head was performed without the administration of intravenous contrast. Dose modulation, iterative reconstruction, and/or weight based adjustment of the mA/kV was utilized to reduce the radiation dose to as low as reasonably achievable.  COMPARISON: 11/16/2021 HISTORY: ORDERING SYSTEM PROVIDED HISTORY: [facility] TECHNOLOGIST PROVIDED HISTORY: Reason for exam:->AMS Has a \"code stroke\" or \"stroke alert\" been called? ->No Decision Support Exception - unselect if not a suspected or confirmed emergency medical condition->Emergency Medical Condition (MA) Reason for Exam: AMS FINDINGS: BRAIN/VENTRICLES: There is no acute intracranial hemorrhage, mass effect or midline shift. No abnormal extra-axial fluid collection. The gray-white differentiation is maintained without evidence of an acute infarct. Hypoattenuation of the periventricular and subcortical white matter is suggestive of chronic small vessel ischemic disease. Mild diffuse parenchymal volume loss is noted. There is no evidence of hydrocephalus. ORBITS: The bilateral globes are intact. SINUSES: Mucoperiosteal thickening of the bilateral ethmoid air cells is noted. Other visualized paranasal sinuses and mastoid air cells are clear. SOFT TISSUES/SKULL:  No acute abnormality of the visualized skull or soft tissues. No acute intracranial abnormality. MRI may be obtained if clinically indicated. XR CHEST PORTABLE    1. Worsening pulmonary edema. Personally reviewed Lab Studies, Imaging, and discussed case with ED physician.     Electronically signed by Casi Godfrey MD on 3/17/2022 at 3:29 AM

## 2022-03-17 NOTE — ANESTHESIA PRE PROCEDURE
Department of Anesthesiology  Preprocedure Note       Name:  Madisyn Tucker   Age:  78 y.o.  :  1940                                          MRN:  [id number]         Date:  3/17/2022      Surgeon: Temi Quiñones):  Lori Bhagat MD    Procedure: Procedure(s):  CYSTOSCOPY LEFT RETROGRADE PYELOGRAM STENT EXCHANGE    Medications prior to admission:   Prior to Admission medications    Medication Sig Start Date End Date Taking? Authorizing Provider   allopurinol (ZYLOPRIM) 100 MG tablet Take 100 mg by mouth daily    Historical Provider, MD   atorvastatin (LIPITOR) 80 MG tablet Take 80 mg by mouth daily    Historical Provider, MD   pregabalin (LYRICA) 50 MG capsule Take 50 mg by mouth 2 times daily. Historical Provider, MD   albuterol sulfate HFA (PROVENTIL HFA) 108 (90 Base) MCG/ACT inhaler Inhale 2 puffs into the lungs every 4 hours as needed for Wheezing or Shortness of Breath With spacer (and mask if indicated). Thanks. 22  Antonio Marie DO   acetaminophen (TYLENOL) 325 MG tablet Take 2 tablets by mouth every 6 hours as needed for Pain 22   Antonio Marie DO   amLODIPine (NORVASC) 10 MG tablet Take 1 tablet by mouth daily 21   GLEN Garrett MD   lisinopril (PRINIVIL;ZESTRIL) 5 MG tablet Take 1 tablet by mouth daily 20   Salvador Lui MD   Insulin Detemir (LEVEMIR SC) Inject 40 Units into the skin In the morning    Historical Provider, MD   Insulin Detemir (LEVEMIR FLEXPEN SC) Inject 10 Units into the skin nightly    Historical Provider, MD   docusate sodium (COLACE, DULCOLAX) 100 MG CAPS Take 100 mg by mouth 2 times daily as needed for Constipation. 14   Mitch Hsu MD   glimepiride (AMARYL) 4 MG tablet Take 4 mg by mouth every morning (before breakfast). Historical Provider, MD       Current medications:    No current facility-administered medications for this visit. No current outpatient medications on file.      Facility-Administered Medications Ordered in Other Visits   Medication Dose Route Frequency Provider Last Rate Last Admin    sodium chloride flush 0.9 % injection 5-40 mL  5-40 mL IntraVENous 2 times per day Pratima Vines MD   10 mL at 03/17/22 0932    sodium chloride flush 0.9 % injection 5-40 mL  5-40 mL IntraVENous PRN Pratima Vines MD        0.9 % sodium chloride infusion  25 mL IntraVENous PRN Pratima Vines MD        ondansetron (ZOFRAN-ODT) disintegrating tablet 4 mg  4 mg Oral Q8H PRN Pratima Vines MD        Or    ondansetron (ZOFRAN) injection 4 mg  4 mg IntraVENous Q6H PRN Pratima Vines MD        polyethylene glycol (GLYCOLAX) packet 17 g  17 g Oral Daily PRN Pratima Vines MD        acetaminophen (TYLENOL) tablet 650 mg  650 mg Oral Q6H PRN Pratima Vines MD        Or   Wolfe acetaminophen (TYLENOL) suppository 650 mg  650 mg Rectal Q6H PRN Pratima Vines MD        heparin (porcine) injection 5,000 Units  5,000 Units SubCUTAneous 3 times per day Pratima Vines MD        meropenem (MERREM) 1,000 mg in sodium chloride 0.9 % 100 mL IVPB (mini-bag)  1,000 mg IntraVENous Q12H Pratima Vines MD        amLODIPine (NORVASC) tablet 10 mg  10 mg Oral Daily Pratima Vines MD   10 mg at 03/17/22 1200    allopurinol (ZYLOPRIM) tablet 100 mg  100 mg Oral Daily Pratima Vines MD   100 mg at 03/17/22 1200    atorvastatin (LIPITOR) tablet 80 mg  80 mg Oral Nightly Pratima Vines MD        [Held by provider] pregabalin (LYRICA) capsule 50 mg  50 mg Oral BID Pratima Vines MD        insulin glargine (LANTUS) injection vial 10 Units  10 Units SubCUTAneous Nightly Pratima Vines MD        insulin glargine (LANTUS) injection vial 20 Units  20 Units SubCUTAneous QAM Pratima Vines MD   20 Units at 03/17/22 1201    insulin lispro (HUMALOG) injection vial 0-12 Units  0-12 Units SubCUTAneous TID WC Pratima Vines MD   4 Units at 03/17/22 1201    insulin lispro (HUMALOG) injection vial 0-6 Units  0-6 Units SubCUTAneous Nightly Oswaldo Cronin MD        glucose (GLUTOSE) 40 % oral gel 15 g  15 g Oral PRN Oswalod Cronin MD        dextrose 50 % IV solution  12.5 g IntraVENous PRN Oswaldo Cronin MD        glucagon (rDNA) injection 1 mg  1 mg IntraMUSCular PRN Oswaldo Cronin MD        dextrose 5 % solution  100 mL/hr IntraVENous PRN Oswaldo Cronin MD           Allergies:  No Known Allergies    Problem List:    Patient Active Problem List   Diagnosis Code    Gait disturbance R26.9    Generalized weakness R53.1    Diabetes mellitus ([address] E11.9    Osteoarthritis M19.90    Anemia of chronic disorder D63.8    Infected implanted bladder sphincter cuff T83.69XA    Escherichia coli urinary tract infection N39.0, B96.20    Hypertension I10    Sepsis ([address] A41.9    Type 2 diabetes mellitus with hypoglycemia without coma ([address] E11.649    UTI (urinary tract infection) due to urinary indwelling catheter ([address] T83.511A, N39.0    Hyperkalemia E87.5    Acute kidney failure (HCC) N17.9    Leg weakness, bilateral R29.898    Type 2 diabetes mellitus with neurological manifestations, uncontrolled ([address] E11.49, F02.56    Complicated UTI (urinary tract infection) N39.0    Essential hypertension I10    Severe muscle deconditioning R29.898    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 (HCC) E11.69, E78.5    Frequent falls R29.6    Chronic kidney disease, stage 3a ([address] N18.31    Uncontrolled type 2 diabetes mellitus with peripheral neuropathy (HCC) E11.42, E11.65    Prostate cancer ([address] C61    Suprapubic catheter ([address] Z93.59    Sepsis secondary to UTI ([address] A41.9, N39.0       Past Medical History:        Diagnosis Date    Acute urinary tract infection 3/15/2012    Ataxia 2010    Diabetes mellitus ([address] 2011    type 2, controlled    Fusion of spine of cervical region 10/2012    Gait disturbance 2011    History of prostate cancer 1996    adenocarcinoma    History of tobacco use     Hyperlipidemia     Osteoarthritis        Past Surgical History:        Procedure Laterality Date    BLADDER SURGERY      COLON SURGERY      OTHER SURGICAL HISTORY  3/28/13    Cysto with removal of artificial sphinter and placement of suprapubic catheter.  PROSTATE SURGERY      PROSTATECTOMY      infection       Social History:    Social History     Tobacco Use    Smoking status: Former Smoker     Packs/day: 0.50     Quit date: 1976     Years since quittin.7    Smokeless tobacco: Never Used   Substance Use Topics    Alcohol use: No     Comment: Quit in                                 Counseling given: Not Answered      Vital Signs (Current): There were no vitals filed for this visit.                                            BP Readings from Last 3 Encounters:   22 (!) 144/67   22 (!) 144/62   21 (!) 143/61       NPO Status:                                                                                 BMI:   Wt Readings from Last 3 Encounters:   22 210 lb (95.3 kg)   22 210 lb (95.3 kg)   21 210 lb (95.3 kg)     There is no height or weight on file to calculate BMI.    CBC:   Lab Results   Component Value Date    WBC 13.9 2022    RBC 3.19 2022    HGB 9.4 2022    HCT 31.5 2022    MCV 98.7 2022    RDW 16.1 2022     2022       CMP:   Lab Results   Component Value Date     2022    K 4.6 2022    K 3.9 2018     2022    CO2 20 2022    BUN 48 2022    CREATININE 3.4 2022    GFRAA 21 2022    LABGLOM 17 2022    GLUCOSE 262 2022    PROT 7.0 2022    PROT 7.6 10/27/2012    CALCIUM 8.7 2022    BILITOT 0.5 2022    ALKPHOS 129 2022    AST 17 2022    ALT 14 2022       POC Tests:   Recent Labs     22  1128   POCGLU 249*       Coags:   Lab Results   Component Value Date    PROTIME 15.5 03/17/2022    PROTIME 15.2 01/24/2011    INR 1.20 03/17/2022    APTT 33.1 03/17/2022       HCG (If Applicable): No results found for: PREGTESTUR, PREGSERUM, HCG, HCGQUANT     ABGs: No results found for: PHART, PO2ART, PDX8DJB, EUZ8HFK, BEART, J3SYVWRM     Type & Screen (If Applicable):  No results found for: LABABO, LABRH    Drug/Infectious Status (If Applicable):  No results found for: HIV, HEPCAB    COVID-19 Screening (If Applicable):   Lab Results   Component Value Date    COVID19 NOT DETECTED 03/17/2022           Anesthesia Evaluation  Patient summary reviewed and Nursing notes reviewed  Airway: Mallampati: III  TM distance: >3 FB   Neck ROM: limited  Mouth opening: > = 3 FB Dental:    (+) upper dentures and edentulous      Pulmonary:Negative Pulmonary ROS   (+) decreased breath sounds,                            ROS comment: Former smoker   Cardiovascular:  Exercise tolerance: poor (<4 METS),   (+) hypertension:, hyperlipidemia        Rhythm: regular  Rate: normal                    Neuro/Psych:   (+) neuromuscular disease (diabetic neuropathy):,             GI/Hepatic/Renal:   (+) renal disease: CRI,           Endo/Other:    (+) DiabetesType II DM, poorly controlled, using insulin, blood dyscrasia: anemia, arthritis: OA., electrolyte abnormalities, malignancy/cancer (prostate). Abdominal:   (+) obese,           Vascular: negative vascular ROS. Other Findings:               Anesthesia Plan      MAC     ASA 3 - emergent       Induction: intravenous.                           Burnis Dec, APRN - CRNA   3/17/2022

## 2022-03-17 NOTE — CONSULTS
[facility] [address]   Consult Note  [facility] 1 2 3 4 5    Date: 3/17/2022   Patient: Viki Dunn   : 1940   DOA: 3/16/2022   MRN: [id number]   ROOM#: [id number]     Reason for Consult: Suprapubic catheter, UTI  Requesting Physician:  Dr. Eliseo Lau  Collaborating Urologist on Call at time of admission: Dr. Alexandria De La Garza: AMS    History Obtained From:  patient, patient's son, and electronic medical record    HISTORY OF PRESENT ILLNESS:                The patient is a 78 y.o. male with significant past medical history of prostate cancer s/p RPP in , DM, CKD, and urinary retention managed with SPT who presented with AMS. His son reports that the pt slept most of the day yesterday which is very unusual for him. Work-up in the ED revealed likely sepsis secondary to a UTI and PAMELLA. CT showed left hydronephrosis/perinephric stranding with a ureteral stent in place, which was originally placed 2021 at [facility] due to mild-moderate hydronephrosis. It has since not been exchanged. Pt endorses intermittent left flank pain x1 week. Discussed recommendation for cystoscopy, left ureteral stent exchange today with associated risks/benefits. Pt and his son, Maury Ye, verbalizes understanding and is agreeable to proceed. ED Provider's HPI 3/16/22: Viki Dunn is a 78 y.o. male with history of diabetes, hyperlipidemia, recurrent UTIs who presents to the ED via EMS with reports of some decreased mentation throughout the day today per son. He eventually called EMS today due to the persistent altered mental status and was brought here for further evaluation. EMS states the patient was febrile. Patient arrives here unable to provide any history. I did get in touch with the patient's son, Naheed Lunsford, who told me patient began to have some generalized weakness yesterday evening and was not eating much. He woke up today very weak and with poor appetite.   As the day progressed he became more weak and confused. He is unaware of any other recent illnesses. Patient himself is unable to provide history secondary to his mental status. Past Medical History:        Diagnosis Date    Acute urinary tract infection 3/15/2012    Ataxia 2010    Diabetes mellitus ([facility] [address] 2011    type 2, controlled    Fusion of spine of cervical region 10/2012    Gait disturbance 2011    History of prostate cancer 1996    adenocarcinoma    History of tobacco use 1959    Hyperlipidemia 2011    Osteoarthritis 2011     Past Surgical History:        Procedure Laterality Date    BLADDER SURGERY      COLON SURGERY      OTHER SURGICAL HISTORY  3/28/13    Cysto with removal of artificial sphinter and placement of suprapubic catheter.     PROSTATE SURGERY      PROSTATECTOMY  1996    infection     Current Medications:   Current Facility-Administered Medications: sodium chloride flush 0.9 % injection 5-40 mL, 5-40 mL, IntraVENous, 2 times per day  sodium chloride flush 0.9 % injection 5-40 mL, 5-40 mL, IntraVENous, PRN  0.9 % sodium chloride infusion, 25 mL, IntraVENous, PRN  ondansetron (ZOFRAN-ODT) disintegrating tablet 4 mg, 4 mg, Oral, Q8H PRN **OR** ondansetron (ZOFRAN) injection 4 mg, 4 mg, IntraVENous, Q6H PRN  polyethylene glycol (GLYCOLAX) packet 17 g, 17 g, Oral, Daily PRN  acetaminophen (TYLENOL) tablet 650 mg, 650 mg, Oral, Q6H PRN **OR** acetaminophen (TYLENOL) suppository 650 mg, 650 mg, Rectal, Q6H PRN  heparin (porcine) injection 5,000 Units, 5,000 Units, SubCUTAneous, 3 times per day  meropenem (MERREM) 1,000 mg in sodium chloride 0.9 % 100 mL IVPB (mini-bag), 1,000 mg, IntraVENous, Q12H  amLODIPine (NORVASC) tablet 10 mg, 10 mg, Oral, Daily  allopurinol (ZYLOPRIM) tablet 100 mg, 100 mg, Oral, Daily  atorvastatin (LIPITOR) tablet 80 mg, 80 mg, Oral, Nightly  pregabalin (LYRICA) capsule 50 mg, 50 mg, Oral, BID  insulin glargine (LANTUS) injection vial 10 Units, 10 Units, SubCUTAneous, Nightly  insulin glargine (LANTUS) injection vial 20 Units, 20 Units, SubCUTAneous, QAM  insulin lispro (HUMALOG) injection vial 0-12 Units, 0-12 Units, SubCUTAneous, TID WC  insulin lispro (HUMALOG) injection vial 0-6 Units, 0-6 Units, SubCUTAneous, Nightly  glucose (GLUTOSE) 40 % oral gel 15 g, 15 g, Oral, PRN  dextrose 50 % IV solution, 12.5 g, IntraVENous, PRN  glucagon (rDNA) injection 1 mg, 1 mg, IntraMUSCular, PRN  dextrose 5 % solution, 100 mL/hr, IntraVENous, PRN    Allergies:  Patient has no known allergies. Social History:   TOBACCO:   reports that he quit smoking about 45 years ago. He smoked 0.50 packs per day. He has never used smokeless tobacco.  ETOH:   reports no history of alcohol use. DRUGS:   reports no history of drug use. Family History:       Problem Relation Age of Onset    Cancer Mother     Diabetes Father     Heart Disease Father     High Blood Pressure Father     Diabetes Sister     High Blood Pressure Sister     Cancer Brother         prostate, breast    Diabetes Brother     Cancer Maternal Uncle     Diabetes Maternal Grandmother     Stroke Maternal Grandfather        REVIEW OF SYSTEMS:     CONSTITUTIONAL:  positive for  fatigue and confusion  RESPIRATORY:  negative  CARDIOVASCULAR:  negative  GASTROINTESTINAL:  positive for abdominal pain  GENITOURINARY:  positive for cloudy urine    PHYSICAL EXAM:      VITALS:  BP (!) 150/88   Pulse 86   Temp 98.9 °F (37.2 °C)   Resp 15   Wt 210 lb (95.3 kg)   SpO2 (!) 89%   BMI 31.94 kg/m²      TEMPERATURE:  Current - Temp: 98.9 °F (37.2 °C);  Max - Temp  Av.2 °F (37.9 °C)  Min: 98.9 °F (37.2 °C)  Max: 101.4 °F (38.6 °C)  24HR BLOOD PRESSURE RANGE:  Systolic (44RUV), OHH:208 , Min:112 , EUY:304   ; Diastolic (73MIC), GBW:82, Min:52, Max:88    Physical Exam:  General appearance: alert, appears stated age, cooperative, fatigued, no distress, mildly obese, slowed mentation and mildly confused  Head: Normocephalic, without obvious abnormality, atraumatic  Back: No CVA tenderness  Abdomen: Soft, non-distended, lower abdominal TTP. 16fr SPT in place with cloudy yellow urine output    DATA:    WBC:    Lab Results   Component Value Date    WBC 14.3 03/16/2022     Hemoglobin/Hematocrit:    Lab Results   Component Value Date    HGB 11.0 03/16/2022    HCT 37.2 03/16/2022     BMP:    Lab Results   Component Value Date     03/16/2022    K 4.1 03/16/2022    K 3.9 02/12/2018     03/16/2022    CO2 16 03/16/2022    BUN 39 03/16/2022    LABALBU 2.9 03/16/2022    CREATININE 2.6 03/16/2022    CALCIUM 9.3 03/16/2022    GFRAA 29 03/16/2022    LABGLOM 24 03/16/2022     PT/INR:    Lab Results   Component Value Date    PROTIME 15.5 03/17/2022    PROTIME 15.2 01/24/2011    INR 1.20 03/17/2022     Blood Culture: pending  Urine Culture: pending    Imaging:  CT ABDOMEN PELVIS WO CONTRAST Additional Contrast? None    Result Date: 3/17/2022  EXAMINATION: CT OF THE ABDOMEN AND PELVIS WITHOUT CONTRAST 3/17/2022 3:04 am TECHNIQUE: CT of the abdomen and pelvis was performed without the administration of intravenous contrast. Multiplanar reformatted images are provided for review. Dose modulation, iterative reconstruction, and/or weight based adjustment of the mA/kV was utilized to reduce the radiation dose to as low as reasonably achievable. COMPARISON: 09/12/2020 HISTORY: ORDERING SYSTEM PROVIDED HISTORY: UTI with altered mental status and history of renal stones TECHNOLOGIST PROVIDED HISTORY: Reason for exam:->UTI with altered mental status and history of renal stones Additional Contrast?->None Decision Support Exception - unselect if not a suspected or confirmed emergency medical condition->Emergency Medical Condition (MA) Reason for Exam: UTI with altered mental status and history of renal stones FINDINGS: Lower Chest: Calcified lymph nodes in the right hilar region. Atelectatic or fibrotic changes along the posterior lungs.  Organs: Lack of IV contrast does reduce the evaluation of the organs and vasculature. There is streak artifact from the arms at the sides. No obvious masses seen within the liver. The gallbladder is distended and may have a tiny gallstone in the lumen. There is no fat stranding of the gallbladder fossa. The spleen is not enlarged but does have multiple calcified granulomas. No pancreatic calcification or ductal dilatation. There is stranding and edema adjacent to the pancreatic tail but appears more associated with the left kidney. The adrenal glands are not enlarged. The right kidney has mild perinephric stranding no hydronephrosis or hydroureter. The left kidney does have asymmetric edema and increased perinephric stranding. Small amount of fluid are seen in the pararenal space. A left ureteral stent is present coursing down into the urinary bladder. No focal calcifications are seen along the course of the stent. However there is still the left-sided hydronephrosis and hydroureter. GI/Bowel: The stomach, small bowel, and colon are not dilated. There is a small hiatal hernia with mild thickening of the distal esophagus. Constipation and gaseous distension of the ascending and transverse colon. Appendix is identified and no focal appendicitis. There is no pneumoperitoneum. Pelvis: The urinary bladder is decompressed with a suprapubic catheter. Cannot accurately evaluate the urinary bladder wall. The distal coil of the left ureteral stent is in the inferior aspect versus is an expanded upper portion of the urethral junction. 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 Peritoneum/Retroperitoneum: No abdominal aortic aneurysm but does have moderate calcification. There is no retroperitoneal hematoma. Small left periaortic lymph nodes were present on the previous exam as well. Mild increase in size may be reactive to the left kidney.  Bones/Soft Tissues: Degenerative changes in the disc spaces, facet joints, and sacroiliac joints. Large Schmorl node in the inferior endplate of Q68 is increased in size. Smaller Schmorl nodes along the lumbar endplates appear stable. 1.  There is left-sided hydronephrosis and hydroureter despite the presence of the left ureteral stent. Increased left perinephric stranding and fluid compared to the right side. Will need to correlate for functionality of the stent versus ascending urinary tract infection. 2.  The urinary bladder is collapsed around the suprapubic catheter. The distal coil of the ureteral stent courses into the urinary bladder along the inferior aspect or an expanded upper portion of the urethral junction. Previous prostate surgery is again noted. 3.  Constipation in the ascending and transverse colon. No small bowel obstruction or pneumoperitoneum. 4.  Mild thickening of the distal esophagus may relate to reflux esophagitis. CT HEAD WO CONTRAST    Result Date: 3/17/2022  EXAMINATION: CT OF THE HEAD WITHOUT CONTRAST  3/17/2022 12:19 am TECHNIQUE: CT of the head was performed without the administration of intravenous contrast. Dose modulation, iterative reconstruction, and/or weight based adjustment of the mA/kV was utilized to reduce the radiation dose to as low as reasonably achievable. COMPARISON: 11/16/2021 HISTORY: ORDERING SYSTEM PROVIDED HISTORY: AMS TECHNOLOGIST PROVIDED HISTORY: Reason for exam:->AMS Has a \"code stroke\" or \"stroke alert\" been called? ->No Decision Support Exception - unselect if not a suspected or confirmed emergency medical condition->Emergency Medical Condition (MA) Reason for Exam: AMS FINDINGS: BRAIN/VENTRICLES: There is no acute intracranial hemorrhage, mass effect or midline shift. No abnormal extra-axial fluid collection. The gray-white differentiation is maintained without evidence of an acute infarct. Hypoattenuation of the periventricular and subcortical white matter is suggestive of chronic small vessel ischemic disease.   Mild diffuse parenchymal volume loss is noted. There is no evidence of hydrocephalus. ORBITS: The bilateral globes are intact. SINUSES: Mucoperiosteal thickening of the bilateral ethmoid air cells is noted. Other visualized paranasal sinuses and mastoid air cells are clear. SOFT TISSUES/SKULL:  No acute abnormality of the visualized skull or soft tissues. No acute intracranial abnormality. MRI may be obtained if clinically indicated. XR CHEST PORTABLE    Result Date: 3/17/2022  EXAMINATION: ONE XRAY VIEW OF THE CHEST 3/16/2022 11:48 pm COMPARISON: 01/06/2022 HISTORY: ORDERING SYSTEM PROVIDED HISTORY: fever TECHNOLOGIST PROVIDED HISTORY: Reason for exam:->fever Reason for Exam: FEVER FINDINGS: The cardiac silhouette is at the upper limits of normal in size. Vascular calcifications are noted along the aortic arch. Pulmonary edema is increasing. No focal lung infiltrate. No pleural effusion or pneumothorax. The bones are osteopenic. 1. Worsening pulmonary edema. Assessment & Plan:      Edgar Jones is a 78y.o. year old male admitted 3/16/2022 for Sepsis secondary to UTI.    1) Chronic Urinary Retention: managed with suprapubic catheter and exchanged monthly, last exchanged 3/9/22.              Recommend SPT exchanges q3 weeks. 2) Sepsis Secondary to Complicated UTI: Urine and blood cultures pending              WBC 14.3, T-max 101.4 overnight.              On IV Merrem  3) Left Hydronephrosis: S/p cystoscopy, left ureteral stent placement 7//4/21   CT a/p 3/17/22: There is left-sided hydronephrosis and hydroureter despite the presence of the left ureteral stent. Increased left perinephric stranding and fluid compared to the right side. Plan for cystoscopy, left ureteral stent exchange this afternoon with Dr. Vasyl Santos. NPO since MN. The pt's son, Oscar Parker, is present at bedside and gives verbal consent to proceed with surgery today.   4) PAMELLA on CKD: Cr 2.6, baseline of ~1.6  5) H/o Prostate Cancer: S/p RPP with Dr. Melvin Davis in [address] [place] [address]. PSA 0.93 2/2021. On Eligard q6 months    Will follow. Patient seen and examined, chart reviewed.      Electronically signed by Jodie Padilla PA-C on 3/17/2022 at 9:05 AM

## 2022-03-17 NOTE — CARE COORDINATION
.CM met with pt's son at bedside for d/c planning. Pt is resting in bed with eyes closed. Introduced self and updated white board. Son states that he lives with pt. Pt is independent with ADL's per son. Son provides his transportation. Pt has a PCP, has insurance, and is able to afford his medication. Pt has a rollator walker, w/c, cane, and grab bars. Pt's son informed CM that pt recently had [facility] and would like to have them when he is d/c'd. Son denies any other d/c needs at this time. D/c plan is home with son and [facility]. Referral made to [address] [place] [address]. Notify CM if any new d/c needs arise. TE      .Please notify [facility] upon discharge [phone] / fax H&P,FS,AVS, and [facility] order to [phone].

## 2022-03-17 NOTE — OP NOTE
[facility]   Operative Note    Date: 3/17/2022   Patient: Emmanuel Galvin   : 1940   DOA: 3/16/2022   MRN: [id number]   ROOM#: OR/NONE     Pre-operative Diagnosis: Left hydronephrosis, UTI, h/o prostate cancer    Post-operative Diagnosis: same    Procedure: Cystoscopy, left ureteral stent exchange, SPT exchange 16 [place]    Anesthesia: Monitored Local Anesthesia with Sedation    Surgeons/Assistants: Mike    EBL: Minimal    Complications: none    Specimens: were not obtained    Indication for Procedure:      Emmanuel Galvin is a 78 y.o. male with history of prostate cancer and urinary incontinence, has an SPT that is exchanged monthly. Had a left JJ stent placed at [facility] . Presented to [facility] with AMS & found to have a a UTI, possible sepsis. CT imaging 3/17/22 (images viewed) suggested a left left HN/HU despite a well positioned left JJ stent. He has been on IV Merrem. The patient was unable to get pain relief and unable to pass calculus, so Emmanuel Galvin was brought to the OR today for cystourethroscopy & left JJ stent eschange. Risks and benefits were explained & Emmanuel Galvin agreed to proceed as planned. Description of procedure: The patient was identified in the holding area, taken to procedure room, and placed in supine position. General anesthesia was administered. Time out was taken to ensure this was the proper operation, for the proper patient, on the proper side; everyone in the room agreed. The patient was then placed in the dorsal lithotomy position, sterilely prepped and then draped in the usual fashion. Rigid cystoscopy ensued. The bladder was drained & thoroughly irrigated. A left ureteral stent was noted. It was grasped & pulled to the meatus. I then passed a Sensor wire alongside the stent. Using the placed sensor wire I passed up a 4.8 [employer] Jackie variable length double J stent with a cystoscope.   It was noted to be in good position proximally fluoroscopically and distally cystoscopically. The patient's bladder was drained after his SPT was exchanged. Celio Easton tolerated the procedure well & without difficulty and was then transferred to PACU to recover.        He will need q 3 month left JJ stent exchanges, monthly SPT exchange    Kiana De Leon MD  Electronically signed at 3/17/2022

## 2022-03-17 NOTE — ED NOTES
Report given to April RN on floor. Pt to be transferred when room is clean.       Annel Cohen RN  03/17/22 [id number]

## 2022-03-17 NOTE — PROGRESS NOTES
Two RN skin assessment done upon admission, no redness, wounds, or open areas noted. Patients skin is clean, dry, and intact.    Anamaria Pena RN, 3/17/2022, 0745  April Javier QUIÑONES, 0973

## 2022-03-17 NOTE — ED NOTES
Dr Ashlyn Nelson changing out SP cath at this time. Pt expresses some discomfort.       Corinne Carlin RN  03/17/22 [id number]

## 2022-03-17 NOTE — ED NOTES
This nurse attempting to obtain blood. Line started with minimal return, unable to obtain enough for specimen. Pt then stuck 3 more times with minimal return getting small amounts from various sites. Kevin Liner RN attempting to get but and was only able to obtain 1 tube before flow stopped. Dr Mickey Waldron made aware and attempted fem stick. Blood clotting in syringe before this nurse could transfer into appropriate tubes. Dr Mickey Waldron made aware.       Shakir Hughes RN  03/17/22 [id number]

## 2022-03-17 NOTE — ANESTHESIA PRE PROCEDURE
Department of Anesthesiology  Preprocedure Note       Name:  Juvenal Taylor   Age:  78 y.o.  :  1940                                          MRN:  [id number]         Date:  3/17/2022      Surgeon: Chelsey Rooney):  Lela Bradshaw MD    Procedure: Procedure(s):  CYSTOSCOPY LEFT RETROGRADE PYELOGRAM STENT EXCHANGE    Medications prior to admission:   Prior to Admission medications    Medication Sig Start Date End Date Taking? Authorizing Provider   allopurinol (ZYLOPRIM) 100 MG tablet Take 100 mg by mouth daily   Yes Historical Provider, MD   atorvastatin (LIPITOR) 80 MG tablet Take 80 mg by mouth daily   Yes Historical Provider, MD   pregabalin (LYRICA) 50 MG capsule Take 50 mg by mouth 2 times daily. Yes Historical Provider, MD   albuterol sulfate HFA (PROVENTIL HFA) 108 (90 Base) MCG/ACT inhaler Inhale 2 puffs into the lungs every 4 hours as needed for Wheezing or Shortness of Breath With spacer (and mask if indicated). Thanks. 22  Anitra Wolff DO   acetaminophen (TYLENOL) 325 MG tablet Take 2 tablets by mouth every 6 hours as needed for Pain 22   Anitra Wolff DO   amLODIPine (NORVASC) 10 MG tablet Take 1 tablet by mouth daily 21   GLEN Grant MD   lisinopril (PRINIVIL;ZESTRIL) 5 MG tablet Take 1 tablet by mouth daily 20   Pancho Guo MD   Insulin Detemir (LEVEMIR SC) Inject 40 Units into the skin In the morning    Historical Provider, MD   Insulin Detemir (LEVEMIR FLEXPEN SC) Inject 10 Units into the skin nightly    Historical Provider, MD   docusate sodium (COLACE, DULCOLAX) 100 MG CAPS Take 100 mg by mouth 2 times daily as needed for Constipation. 14   Mitch Hsu MD   glimepiride (AMARYL) 4 MG tablet Take 4 mg by mouth every morning (before breakfast).     Historical Provider, MD       Current medications:    Current Facility-Administered Medications   Medication Dose Route Frequency Provider Last Rate Last Admin    sodium chloride flush 0.9 % injection 5-40 mL  5-40 mL IntraVENous 2 times per day Barbara Murray MD   10 mL at 03/17/22 0932    sodium chloride flush 0.9 % injection 5-40 mL  5-40 mL IntraVENous PRN Barbara Murray MD        0.9 % sodium chloride infusion  25 mL IntraVENous PRN Barbara Murray MD        ondansetron (ZOFRAN-ODT) disintegrating tablet 4 mg  4 mg Oral Q8H PRN Barbara Murray MD        Or    ondansetron (ZOFRAN) injection 4 mg  4 mg IntraVENous Q6H PRN Barbara Murray MD        polyethylene glycol (GLYCOLAX) packet 17 g  17 g Oral Daily PRN Barbara Murray MD        acetaminophen (TYLENOL) tablet 650 mg  650 mg Oral Q6H PRN Barbara Murray MD        Or   Alysia Kapoor acetaminophen (TYLENOL) suppository 650 mg  650 mg Rectal Q6H PRN Barbara Murray MD        heparin (porcine) injection 5,000 Units  5,000 Units SubCUTAneous 3 times per day Barbara Murray MD        meropenem (MERREM) 1,000 mg in sodium chloride 0.9 % 100 mL IVPB (mini-bag)  1,000 mg IntraVENous Q12H Barbara Murray MD        amLODIPine (NORVASC) tablet 10 mg  10 mg Oral Daily Barbara Murray MD        allopurinol (ZYLOPRIM) tablet 100 mg  100 mg Oral Daily Barbara Murray MD        atorvastatin (LIPITOR) tablet 80 mg  80 mg Oral Nightly Barbara Murray MD        [Held by provider] pregabalin (LYRICA) capsule 50 mg  50 mg Oral BID Barbara Murray MD        insulin glargine (LANTUS) injection vial 10 Units  10 Units SubCUTAneous Nightly Barbara Murray MD        insulin glargine (LANTUS) injection vial 20 Units  20 Units SubCUTAneous QAM Barbara Murray MD        insulin lispro (HUMALOG) injection vial 0-12 Units  0-12 Units SubCUTAneous TID  Barbara Murray MD   6 Units at 03/17/22 0935    insulin lispro (HUMALOG) injection vial 0-6 Units  0-6 Units SubCUTAneous Nightly Barbara Murray MD        glucose (GLUTOSE) 40 % oral gel 15 g  15 g Oral PRN MD Alysia Mcmahon dextrose 50 % IV solution  12.5 g IntraVENous PRN Felisha Mckeon MD        glucagon (rDNA) injection 1 mg  1 mg IntraMUSCular PRN Felisha Mckeon MD        dextrose 5 % solution  100 mL/hr IntraVENous PRN Felisha Mckeon MD           Allergies:  No Known Allergies    Problem List:    Patient Active Problem List   Diagnosis Code    Gait disturbance R26.9    Generalized weakness R53.1    Diabetes mellitus ([address] E11.9    Osteoarthritis M19.90    Anemia of chronic disorder D63.8    Infected implanted bladder sphincter cuff T83.69XA    Escherichia coli urinary tract infection N39.0, B96.20    Hypertension I10    Sepsis ([address] A41.9    Type 2 diabetes mellitus with hypoglycemia without coma ([address] E11.649    UTI (urinary tract infection) due to urinary indwelling catheter ([address] T83.511A, N39.0    Hyperkalemia E87.5    Acute kidney failure (HCC) N17.9    Leg weakness, bilateral R29.898    Type 2 diabetes mellitus with neurological manifestations, uncontrolled (HCC) E11.49, R87.96    Complicated UTI (urinary tract infection) N39.0    Essential hypertension I10    Severe muscle deconditioning R29.898    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 (HCC) E11.69, E78.5    Frequent falls R29.6    Chronic kidney disease, stage 3a (HCC) N18.31    Uncontrolled type 2 diabetes mellitus with peripheral neuropathy (HCC) E11.42, E11.65    Prostate cancer ([address] C61    Suprapubic catheter ([address] Z93.59    Sepsis secondary to UTI ([address] A41.9, N39.0       Past Medical History:        Diagnosis Date    Acute urinary tract infection 3/15/2012    Ataxia 2010    Diabetes mellitus ([address] 2011    type 2, controlled    Fusion of spine of cervical region 10/2012    Gait disturbance 2011    History of prostate cancer 1996    adenocarcinoma    History of tobacco use 1959    Hyperlipidemia 2011    Osteoarthritis 2011       Past Surgical History:        Procedure Laterality Date    BLADDER SURGERY  COLON SURGERY      OTHER SURGICAL HISTORY  3/28/13    Cysto with removal of artificial sphinter and placement of suprapubic catheter.  PROSTATE SURGERY      PROSTATECTOMY  1996    infection       Social History:    Social History     Tobacco Use    Smoking status: Former Smoker     Packs/day: 0.50     Quit date: 1976     Years since quittin.7    Smokeless tobacco: Never Used   Substance Use Topics    Alcohol use: No     Comment: Quit in                                 Counseling given: Not Answered      Vital Signs (Current):   Vitals:    22 0354 22 0359 22 0745 22 0900   BP: (!) 112/52  (!) 150/88 (!) 129/54   Pulse:  85 86 79   Resp:  18 15 15   Temp:  98.9 °F (37.2 °C)     TempSrc:       SpO2: 95%  (!) 89% 96%   Weight:                                                  BP Readings from Last 3 Encounters:   22 (!) 129/54   22 (!) 144/62   21 (!) 143/61       NPO Status:                                                                                 BMI:   Wt Readings from Last 3 Encounters:   22 210 lb (95.3 kg)   22 210 lb (95.3 kg)   21 210 lb (95.3 kg)     Body mass index is 31.94 kg/m².     CBC:   Lab Results   Component Value Date    WBC 14.3 2022    RBC 3.66 2022    HGB 11.0 2022    HCT 37.2 2022    .6 2022    RDW 15.9 2022     2022       CMP:   Lab Results   Component Value Date     2022    K 4.1 2022    K 3.9 2018     2022    CO2 16 2022    BUN 39 2022    CREATININE 2.6 2022    GFRAA 29 2022    LABGLOM 24 2022    GLUCOSE 310 2022    PROT 7.0 2022    PROT 7.6 10/27/2012    CALCIUM 9.3 2022    BILITOT 0.5 2022    ALKPHOS 129 2022    AST 17 2022    ALT 14 2022       POC Tests:   Recent Labs     22  0803   POCGLU 276*       Coags:   Lab Results   Component Value Date    PROTIME 15.5 03/17/2022    PROTIME 15.2 01/24/2011    INR 1.20 03/17/2022    APTT 33.1 03/17/2022       HCG (If Applicable): No results found for: PREGTESTUR, PREGSERUM, HCG, HCGQUANT     ABGs: No results found for: PHART, PO2ART, HXS1QSA, CZI5XEF, BEART, E4SUBAJA     Type & Screen (If Applicable):  No results found for: LABABO, LABRH    Drug/Infectious Status (If Applicable):  No results found for: HIV, HEPCAB    COVID-19 Screening (If Applicable):   Lab Results   Component Value Date    COVID19 NOT DETECTED 03/17/2022           Anesthesia Evaluation  Patient summary reviewed and Nursing notes reviewed  Airway:         Dental:          Pulmonary:                             ROS comment: Former smoker   Cardiovascular:  Exercise tolerance: poor (<4 METS),   (+) hypertension:, hyperlipidemia                  Neuro/Psych:   (+) neuromuscular disease (diabetic neuropathy):,             GI/Hepatic/Renal:   (+) renal disease: CRI,           Endo/Other:    (+) DiabetesType II DM, poorly controlled, using insulin, blood dyscrasia: anemia, arthritis: OA., electrolyte abnormalities, malignancy/cancer (prostate). Abdominal:             Vascular: Other Findings:             Anesthesia Plan      MAC     ASA 3 - emergent       Induction: intravenous.                           Halie Dejesus DO   3/17/2022

## 2022-03-17 NOTE — ED PROVIDER NOTES
CHIEF COMPLAINT    Chief Complaint   Patient presents with    Altered Mental Status     decline in mental status today     HPI  Thenimo Almendarez is a 78 y.o. male with history of diabetes, hyperlipidemia, recurrent UTIs who presents to the ED via EMS with reports of some decreased mentation throughout the day today per son. He eventually called EMS today due to the persistent altered mental status and was brought here for further evaluation. EMS states the patient was febrile. Patient arrives here unable to provide any history. I did get in touch with the patient's son, Suzie Draper, who told me patient began to have some generalized weakness yesterday evening and was not eating much. He woke up today very weak and with poor appetite. As the day progressed he became more weak and confused. He is unaware of any other recent illnesses. Patient himself is unable to provide history secondary to his mental status. REVIEW OF SYSTEMS  Unable to obtain secondary patient mental status    ? ?   PAST MEDICAL HISTORY  Past Medical History:   Diagnosis Date    Acute urinary tract infection 3/15/2012    Ataxia 2010    Diabetes mellitus ([facility] 2011    type 2, controlled    Fusion of spine of cervical region 10/2012    Gait disturbance 2011    History of prostate cancer 1996    adenocarcinoma    History of tobacco use 1959    Hyperlipidemia 2011    Osteoarthritis 2011     FAMILY HISTORY  Family History   Problem Relation Age of Onset    Cancer Mother     Diabetes Father     Heart Disease Father     High Blood Pressure Father     Diabetes Sister     High Blood Pressure Sister     Cancer Brother         prostate, breast    Diabetes Brother     Cancer Maternal Uncle     Diabetes Maternal Grandmother     Stroke Maternal Grandfather      SOCIAL HISTORY  Social History     Socioeconomic History    Marital status:      Spouse name: None    Number of children: 3    Years of education: None    Highest education level: None   Occupational History    None   Tobacco Use    Smoking status: Former Smoker     Packs/day: 0.50     Quit date: 1976     Years since quittin.7    Smokeless tobacco: Never Used   Vaping Use    Vaping Use: None   Substance and Sexual Activity    Alcohol use: No     Comment: Quit in     Drug use: No    Sexual activity: None   Other Topics Concern    None   Social History Narrative    None     Social Determinants of Health     Financial Resource Strain:     Difficulty of Paying Living Expenses: Not on file   Food Insecurity:     Worried About Running Out of Food in the Last Year: Not on file    Dixon of Food in the Last Year: Not on file   Transportation Needs:     Lack of Transportation (Medical): Not on file    Lack of Transportation (Non-Medical): Not on file   Physical Activity:     Days of Exercise per Week: Not on file    Minutes of Exercise per Session: Not on file   Stress:     Feeling of Stress : Not on file   Social Connections:     Frequency of Communication with Friends and Family: Not on file    Frequency of Social Gatherings with Friends and Family: Not on file    Attends Yarsani Services: Not on file    Active Member of [address] [place] [address] or Organizations: Not on file    Attends Club or Organization Meetings: Not on file    Marital Status: Not on file   Intimate Partner Violence:     Fear of Current or Ex-Partner: Not on file    Emotionally Abused: Not on file    Physically Abused: Not on file    Sexually Abused: Not on file   Housing Stability:     Unable to Pay for Housing in the Last Year: Not on file    Number of [place] in the Last Year: Not on file    Unstable Housing in the Last Year: Not on file       SURGICAL HISTORY  Past Surgical History:   Procedure Laterality Date    [facility]  3/28/13    Cysto with removal of artificial sphinter and placement of suprapubic catheter.     PROSTATE SURGERY      PROSTATECTOMY  1996    infection     CURRENT MEDICATIONS  Previous Medications    ACETAMINOPHEN (TYLENOL) 325 MG TABLET    Take 2 tablets by mouth every 6 hours as needed for Pain    ALBUTEROL SULFATE HFA (PROVENTIL HFA) 108 (90 BASE) MCG/ACT INHALER    Inhale 2 puffs into the lungs every 4 hours as needed for Wheezing or Shortness of Breath With spacer (and mask if indicated). Thanks. AMLODIPINE (NORVASC) 10 MG TABLET    Take 1 tablet by mouth daily    DOCUSATE SODIUM (COLACE, DULCOLAX) 100 MG CAPS    Take 100 mg by mouth 2 times daily as needed for Constipation. GLIMEPIRIDE (AMARYL) 4 MG TABLET    Take 4 mg by mouth every morning (before breakfast). HYDRALAZINE (APRESOLINE) 25 MG TABLET    Take 1 tablet by mouth every 8 hours    INSULIN DETEMIR (LEVEMIR FLEXPEN SC)    Inject 10 Units into the skin nightly    INSULIN DETEMIR (LEVEMIR SC)    Inject 40 Units into the skin In the morning    LABETALOL (NORMODYNE) 200 MG TABLET    Take 1 tablet by mouth every 12 hours    LISINOPRIL (PRINIVIL;ZESTRIL) 5 MG TABLET    Take 1 tablet by mouth daily    OXYBUTYNIN (DITROPAN-XL) 5 MG EXTENDED RELEASE TABLET    Take 1 tablet by mouth daily for 7 days     ALLERGIES  No Known Allergies    Nursing notes reviewed by myself for past medical history, family history, social history, surgical history, current medications, and allergies. PHYSICAL EXAM  VITAL SIGNS: Triage VS:    ED Triage Vitals   Enc Vitals Group      BP 03/17/22 0001 (!) 143/65      Pulse 03/16/22 2336 104      Resp 03/16/22 2336 21      Temp --       Temp src --       SpO2 03/16/22 2336 95 %      Weight 03/16/22 2324 210 lb (95.3 kg)      Height --       Head Circumference --       Peak Flow --       Pain Score --       Pain Loc --       Pain Edu? --       Excl.  in [address]? --      Constitutional: Well developed, Well nourished, chronically ill-appearing  HENT: Normocephalic, Atraumatic, Bilateral external ears normal, Oropharynx moist, No oral exudates, Nose normal.   Eyes: PERRL, EOMI, Conjunctiva normal, No discharge. No scleral icterus. Neck: Normal range of motion, No tenderness, Supple. Lymphatic: No lymphadenopathy noted. Cardiovascular: Normal heart rate, Normal rhythm, No murmurs, gallops or rubs. Thorax & Lungs: Slightly diminished breath sounds bilaterally without definitive wheezing, rhonchi, or rales  Abdomen: Soft, No tenderness, No masses, No pulsatile masses, No distention, Normal bowel sounds  Skin: Warm, Dry, Pink, No mottling, No erythema, No rash. Back: No tenderness, No CVA tenderness. Extremities: Symmetrical 2+ edema to bilateral lower extremities, no tenderness, No cyanosis, Normal perfusion, No clubbing. Musculoskeletal: Good range of motion in all major joints as observed. No major deformities noted. Neurologic: Alert & oriented to person only, GCS 14 secondary to confusion, normal motor function, Normal sensory function, CN II-XII grossly intact as tested, No focal deficits noted.    Psychiatric: Affect normal, Judgment normal, Mood normal.       RADIOLOGY  Labs Reviewed   CBC WITH AUTO DIFFERENTIAL - Abnormal; Notable for the following components:       Result Value    WBC 14.3 (*)     RBC 3.66 (*)     Hemoglobin 11.0 (*)     Hematocrit 37.2 (*)     .6 (*)     MCHC 29.6 (*)     RDW 15.9 (*)     MPV 12.2 (*)     Metamyelocytes Relative 4 (*)     Bands Relative 15 (*)     Segs Relative 72.0 (*)     Lymphocytes % 6.0 (*)     All other components within normal limits   URINALYSIS WITH MICROSCOPIC - Abnormal; Notable for the following components:    Clarity, UA TURBID (*)     Ketones, Urine TRACE (*)     Blood, Urine MODERATE (*)     Protein,  (*)     Leukocyte Esterase, Urine LARGE NUMBER OR AMOUNT OF  (*)     WBC,  (*)     Bacteria, UA MODERATE (*)     Mucus, UA FEW (*)     All other components within normal limits   BASIC METABOLIC PANEL - Abnormal; Notable for the following components: Sodium 133 (*)     CO2 16 (*)     Anion Gap 17 (*)     BUN 39 (*)     CREATININE 2.6 (*)     Glucose 310 (*)     GFR Non- 24 (*)     GFR  29 (*)     All other components within normal limits   HEPATIC FUNCTION PANEL - Abnormal; Notable for the following components:    Albumin 2.9 (*)     All other components within normal limits   BRAIN NATRIURETIC PEPTIDE - Abnormal; Notable for the following components:    Pro-BNP 5,923 (*)     All other components within normal limits   TROPONIN - Abnormal; Notable for the following components:    Troponin T 0.044 (*)     All other components within normal limits   COVID-19, RAPID   RAPID INFLUENZA A/B ANTIGENS   CULTURE, BLOOD 1   CULTURE, BLOOD 2   CULTURE, URINE   LIPASE   PROTIME-INR   APTT   LACTIC ACID     I personally reviewed the images. The radiologist's interpretation reveals:  Last Imaging results   CT HEAD WO CONTRAST   Final Result   No acute intracranial abnormality. MRI may be obtained if clinically   indicated. XR CHEST PORTABLE   Preliminary Result   1. Worsening pulmonary edema. CT ABDOMEN PELVIS WO CONTRAST Additional Contrast? None    (Results Pending)       EKG: Per my interpretation demonstrates normal sinus rhythm at a rate of 98 bpm.  Normal axis. Normal intervals. No acute ST segment changes.       MEDS GIVEN IN ED:  Medications   meropenem (MERREM) 1,000 mg in sodium chloride 0.9 % 100 mL IVPB (mini-bag) (has no administration in time range)   acetaminophen (TYLENOL) suppository 650 mg (has no administration in time range)   furosemide (LASIX) injection 20 mg (has no administration in time range)   insulin regular (HUMULIN R;NOVOLIN R) injection 6 Units (has no administration in time range)   0.9 % sodium chloride bolus (0 mLs IntraVENous Stopped 3/17/22 4140)     COURSE & MEDICAL DECISION MAKING    24-year-old male presents emergency department via EMS with reports of some increasing weakness confusion throughout the day today with fever. Son states he had poor p.o. intake since yesterday and has worsening fatigue and confusion throughout the day. He is normally completely oriented at home. Initial vital signs here show fever of temperature of 101.4 °F and initially systolic blood pressure is 158. Patient is here able to tell me his name only. He appears chronically ill. He has 2+ edema to bilateral lower extremities. Hopper catheter is in place with a soft abdomen. He does follow commands and has no obvious focal deficits by neurological exam.  At this time we will obtain CT of the head, chest x-ray, urinalysis, CBC, BMP, lactic acid, blood cultures, COVID-19 testing, influenza testing. CT of the head is without acute intracranial pathology. Chest x-ray shows evidence of pulmonary edema and his BNP is elevated to 5923. It does not appear the patient has underlying history of CHF. He was initially provided with small amount of IV fluids at this time Lasix was ordered. His BMP shows chronic kidney disease with a creatinine of 2.6. His baseline appears to be around 2.0. He does have evidence of hyperglycemia as well with glucose of 310. Insulin ordered for this. His EKG is without acute ischemic changes. Does have mildly elevated troponin of 0.044. COVID-19 testing and influenza test are negative. Urinalysis shows evidence of UTI and urine was sent for culture. Patient does have history of ESBL UTI and therefore meropenem was ordered for him. At this time given the patient's evidence of sepsis and encephalopathy I do feel he will benefit from hospitalization for further antibiotic therapy. He would likely benefit from echocardiogram in his hospitalization given evidence of some new onset CHF findings. Son is now at bedside and agreeable with this plan. Suprapubic catheter replaced at bedside.  Patient discussed with Dr. Petra Soto of hospitalist team who agreed to hospitalize patient for further management. We did discuss that he has history of renal stones and CT of the abdomen/pelvis to evaluate for any evidence of infected stone and this will be followed up on by hospitalist team.      Amount and/or Complexity of Data Reviewed  Clinical lab tests: reviewed  Decide to obtain previous medical records or to obtain history from someone other than the patient: yes       -  Patient seen and evaluated in the emergency department. -  Triage and nursing notes reviewed and incorporated. -  Old chart records reviewed and incorporated. -  Work-up included:  See above  -  Results discussed with patient. Appropriate PPE utilized as indicated for entire patient encounter? Time of Disposition: See timeline  ? New Prescriptions    No medications on file     FINAL IMPRESSION  1. Sepsis, due to unspecified organism, unspecified whether acute organ dysfunction present ([facility] [address]    2. Urinary tract infection associated with cystostomy catheter, initial encounter ([facility]    3. Encephalopathy    4. Acute pulmonary edema (HCC)    5. Elevated brain natriuretic peptide (BNP) level    6. Chronic kidney disease, unspecified CKD stage    7. Type 2 diabetes mellitus with hyperglycemia, with long-term current use of insulin ([facility] [address]        Electronically signed by:  [address], DO, 3/17/2022         [address] Fed[address], DO  03/17/22 1152

## 2022-03-17 NOTE — PROGRESS NOTES
Subjective: The patient is an 78-year-old female with history of hypertension, diabetes mellitus, CKD stage III, prostate cancer, status post stent placement and ureteral stent, CVA and recent Covid who was admitted with confusion. On admission creatinine 2.6, WBC 14.3, UA positive for cystitis, chest x-ray showed worsening pulmonary edema, CT abdomen showed left-sided hydronephrosis and hydroureter despite presence of stent with perinephric stranding, IV meropenem and IV fluids started      Vitals: Afebrile, heart rate 80s range, blood pressure 150/88, on room air      Home medications: Allopurinol, Lipitor, Lyrica, amlodipine, hydralazine, lisinopril, insulin detemir, glimepiride      Current Medications: Allopurinol, amlodipine, Lipitor, heparin, Lantus, sliding scale insulin, meropenem, Lyrica      Labs: Sodium 133, potassium 4.1, chloride 100, bicarb 16, BUN 39, creatinine 2.6, proBNP 5923  WBC 14.3, hemoglobin 11, platelet 396  UA positive for cystitis    Imaging: Chest x-ray shows worsening pulmonary edema    CT abdomen pelvis  1.  There is left-sided hydronephrosis and hydroureter despite the presence of the left ureteral stent.  Increased left perinephric stranding and fluid compared to the right side.  Will need to correlate for functionality of the stent versus ascending urinary tract infection. 2.  The urinary bladder is collapsed around the suprapubic catheter.  The distal coil of the ureteral stent courses into the urinary bladder along the inferior aspect or an expanded upper portion of the urethral junction. Previous prostate surgery is again noted. 3.  Constipation in the ascending and transverse colon.  No small bowel obstruction or pneumoperitoneum. 4.  Mild thickening of the distal esophagus may relate to reflux esophagitis.      Plan:   Sepsis secondary to acute cystitis  Acute pyonephritis  History of suprapubic catheter  Acute metabolic encephalopathy  PAMELLA on CKD stage IIIa  Anion gap metabolic acidosis  Pulmonary edema  Diabetes mellitus type 2  Diabetic neuropathy  Chronic anemia  History of gout  History of left-sided hydronephrosis status post stent placement  History of CVA with MCA infarct    Admitted with altered mentation, creatinine elevated, CT abdomen shows perinephric stranding  Continue meropenem, urology consultation  Hold Lyrica, gentle hydration, if requiring oxygen will start Lasix  Avoid nephrotoxic medication  Sliding scale insulin, diabetic diet, Lantus  Check CBC and BMP in a.m.   Patient admitted this morning we will continue to follow from tomorrow onwards

## 2022-03-17 NOTE — PROGRESS NOTES
Physician Progress Note      PATIENT:               Melanie Walker  CSN #:                  [id number]  :                       1940  ADMIT DATE:       3/16/2022 11:35 PM  [address] [place] DATE:  RESPONDING  PROVIDER #:        ASIF Birdia Osler MD          QUERY TEXT:    Pt admitted with UTI. Pt noted to have suprapubic catheter. If possible,   please document in the progress notes and discharge summary if you are   evaluating and/or treating any of the following: The medical record reflects the following:  Risk Factors: UTI, suprapubic catheter, Hx of ESBL Klebsiella pneumonia on     Clinical Indicators: H&P reflects \"Sepsis secondary to UTI\" and  \"Patient has   suprapubic catheter. \"  Treatment: Suprapubic catheter exchanged in ED, UA, IV Merrem. Thank you,  Diana Reich RN, CDS  [phone]  Options provided:  -- UTI due to suprapubic catheter  -- UTI not due to suprapubic catheter  -- Other - I will add my own diagnosis  -- Disagree - Not applicable / Not valid  -- Disagree - Clinically unable to determine / Unknown  -- Refer to Clinical Documentation Reviewer    PROVIDER RESPONSE TEXT:    UTI is due to suprapubic catheter.     Query created by: Mirna Romo on 3/17/2022 8:02 AM      Electronically signed by:  Lenora Rodriguez MD 3/17/2022 11:39 AM

## 2022-03-17 NOTE — ED NOTES
Pt brought in by EMS from home. Medics report son called EMS as pt has had a decline in status. Refusing to eat or drink today and yesterday. Pt complaining of nausea yesterday. Cath in place with odor noted. Pt pale in color with dry mucus membranes.       Shakir Hughes RN  03/17/22 [id number]

## 2022-03-18 LAB
CULTURE: NORMAL
GLUCOSE BLD-MCNC: 122 MG/DL (ref 70–99)
GLUCOSE BLD-MCNC: 226 MG/DL (ref 70–99)
GLUCOSE BLD-MCNC: 253 MG/DL (ref 70–99)
GLUCOSE BLD-MCNC: 258 MG/DL (ref 70–99)
HCT VFR BLD CALC: 26 % (ref 42–52)
HEMOGLOBIN: 7.9 GM/DL (ref 13.5–18)
Lab: NORMAL
MCH RBC QN AUTO: 29.6 PG (ref 27–31)
MCHC RBC AUTO-ENTMCNC: 30.4 % (ref 32–36)
MCV RBC AUTO: 97.4 FL (ref 78–100)
PDW BLD-RTO: 16.3 % (ref 11.7–14.9)
PLATELET # BLD: 129 K/CU MM (ref 140–440)
PMV BLD AUTO: 13 FL (ref 7.5–11.1)
RBC # BLD: 2.67 M/CU MM (ref 4.6–6.2)
SPECIMEN: NORMAL
WBC # BLD: 10.8 K/CU MM (ref 4–10.5)

## 2022-03-18 PROCEDURE — 6370000000 HC RX 637 (ALT 250 FOR IP): Performed by: INTERNAL MEDICINE

## 2022-03-18 PROCEDURE — 2140000000 HC CCU INTERMEDIATE R&B

## 2022-03-18 PROCEDURE — 85027 COMPLETE CBC AUTOMATED: CPT

## 2022-03-18 PROCEDURE — 87040 BLOOD CULTURE FOR BACTERIA: CPT

## 2022-03-18 PROCEDURE — 36415 COLL VENOUS BLD VENIPUNCTURE: CPT

## 2022-03-18 PROCEDURE — 94761 N-INVAS EAR/PLS OXIMETRY MLT: CPT

## 2022-03-18 PROCEDURE — 6360000002 HC RX W HCPCS: Performed by: INTERNAL MEDICINE

## 2022-03-18 PROCEDURE — 82962 GLUCOSE BLOOD TEST: CPT

## 2022-03-18 PROCEDURE — 2580000003 HC RX 258: Performed by: INTERNAL MEDICINE

## 2022-03-18 RX ORDER — FUROSEMIDE 10 MG/ML
80 INJECTION INTRAMUSCULAR; INTRAVENOUS ONCE
Status: COMPLETED | OUTPATIENT
Start: 2022-03-18 | End: 2022-03-18

## 2022-03-18 RX ADMIN — ATORVASTATIN CALCIUM 80 MG: 80 TABLET, FILM COATED ORAL at 20:27

## 2022-03-18 RX ADMIN — SODIUM CHLORIDE, PRESERVATIVE FREE 10 ML: 5 INJECTION INTRAVENOUS at 20:28

## 2022-03-18 RX ADMIN — AMLODIPINE BESYLATE 10 MG: 10 TABLET ORAL at 08:59

## 2022-03-18 RX ADMIN — ALLOPURINOL 100 MG: 100 TABLET ORAL at 08:59

## 2022-03-18 RX ADMIN — HEPARIN SODIUM 5000 UNITS: 5000 INJECTION INTRAVENOUS; SUBCUTANEOUS at 20:27

## 2022-03-18 RX ADMIN — HEPARIN SODIUM 5000 UNITS: 5000 INJECTION INTRAVENOUS; SUBCUTANEOUS at 15:29

## 2022-03-18 RX ADMIN — INSULIN GLARGINE 10 UNITS: 100 INJECTION, SOLUTION SUBCUTANEOUS at 20:28

## 2022-03-18 RX ADMIN — FUROSEMIDE 80 MG: 10 INJECTION, SOLUTION INTRAMUSCULAR; INTRAVENOUS at 15:29

## 2022-03-18 RX ADMIN — INSULIN GLARGINE 20 UNITS: 100 INJECTION, SOLUTION SUBCUTANEOUS at 08:59

## 2022-03-18 RX ADMIN — INSULIN LISPRO 6 UNITS: 100 INJECTION, SOLUTION INTRAVENOUS; SUBCUTANEOUS at 11:39

## 2022-03-18 RX ADMIN — INSULIN LISPRO 4 UNITS: 100 INJECTION, SOLUTION INTRAVENOUS; SUBCUTANEOUS at 17:00

## 2022-03-18 RX ADMIN — MEROPENEM 1000 MG: 1 INJECTION, POWDER, FOR SOLUTION INTRAVENOUS at 15:39

## 2022-03-18 RX ADMIN — HEPARIN SODIUM 5000 UNITS: 5000 INJECTION INTRAVENOUS; SUBCUTANEOUS at 05:26

## 2022-03-18 RX ADMIN — POLYETHYLENE GLYCOL 3350 17 G: 17 POWDER, FOR SOLUTION ORAL at 17:58

## 2022-03-18 RX ADMIN — MEROPENEM 1000 MG: 1 INJECTION, POWDER, FOR SOLUTION INTRAVENOUS at 03:48

## 2022-03-18 RX ADMIN — SODIUM CHLORIDE, PRESERVATIVE FREE 10 ML: 5 INJECTION INTRAVENOUS at 09:00

## 2022-03-18 ASSESSMENT — PAIN SCALES - GENERAL
PAINLEVEL_OUTOF10: 0

## 2022-03-18 NOTE — PROGRESS NOTES
[facility]   Progress Note  [facility] 0 1 2      Date: 3/18/2022   Patient: Ankit Roy   : 1940   DOA: 3/16/2022   MRN: [id number]   ROOM#: [id number]     Admit Date: 3/16/2022     Collaborating Urologist on Call at time of admission: Dr. Petra Hester  CC: AMS  Reason for Consult: Suprapubic catheter, UTI  POD #1 cystoscopy, left ureteral stent exchange, SPT exchange 16 [place]    Subjective:     Pain: mild, no nausea and no vomiting,   Bowel Movement/Flatus:   Yes  Voidinfr SPT in place, urine cloudy yellow with debris in tubing     Pt resting in bed, states he is feeling better overall but still feels fatigued. Objective:    Vitals:    BP (!) 137/58   Pulse 87   Temp 98.9 °F (37.2 °C) (Oral)   Resp 18   Ht 6' (1.829 m)   Wt 201 lb 14.4 oz (91.6 kg)   SpO2 96%   BMI 27.38 kg/m²    Temp  Av.5 °F (36.9 °C)  Min: 97.8 °F (36.6 °C)  Max: 99.1 °F (37.3 °C)     Intake/Output Summary (Last 24 hours) at 3/18/2022 0910  Last data filed at 3/18/2022 0348  Gross per 24 hour   Intake 440 ml   Output 300 ml   Net 140 ml     Physical Exam:  General appearance: alert, appears stated age, cooperative, fatigued, no distress, and mildly obese  Head: Normocephalic, without obvious abnormality, atraumatic  Back: No CVA tenderness  Abdomen: Soft, non-distended, lower abdominal TTP.  16fr SPT in place, urine cloudy yellow with debris in tubing     Labs:   WBC:    Lab Results   Component Value Date    WBC 13.9 2022      Hemoglobin/Hematocrit:    Lab Results   Component Value Date    HGB 9.4 2022    HCT 31.5 2022      BMP:   Lab Results   Component Value Date     2022    K 4.6 2022    K 3.9 2018     2022    CO2 20 2022    BUN 48 2022    LABALBU 2.9 2022    CREATININE 3.4 2022    CALCIUM 8.7 2022    GFRAA 21 2022    LABGLOM 17 2022      PT/INR:    Lab Results   Component Value Date PROTIME 15.5 03/17/2022    PROTIME 15.2 01/24/2011    INR 1.20 03/17/2022      PTT:    Lab Results   Component Value Date    APTT 33.1 03/17/2022     Blood Culture: +Klebsiella pneumoniae and +Streptococcus  Urine Culture: pending    Imaging:  CT ABDOMEN PELVIS WO CONTRAST Additional Contrast? None    Result Date: 3/17/2022  EXAMINATION: CT OF THE ABDOMEN AND PELVIS WITHOUT CONTRAST 3/17/2022 3:04 am TECHNIQUE: CT of the abdomen and pelvis was performed without the administration of intravenous contrast. Multiplanar reformatted images are provided for review. Dose modulation, iterative reconstruction, and/or weight based adjustment of the mA/kV was utilized to reduce the radiation dose to as low as reasonably achievable. COMPARISON: 09/12/2020 HISTORY: ORDERING SYSTEM PROVIDED HISTORY: UTI with altered mental status and history of renal stones TECHNOLOGIST PROVIDED HISTORY: Reason for exam:->UTI with altered mental status and history of renal stones Additional Contrast?->None Decision Support Exception - unselect if not a suspected or confirmed emergency medical condition->Emergency Medical Condition (MA) Reason for Exam: UTI with altered mental status and history of renal stones FINDINGS: Lower Chest: Calcified lymph nodes in the right hilar region. Atelectatic or fibrotic changes along the posterior lungs. Organs: Lack of IV contrast does reduce the evaluation of the organs and vasculature. There is streak artifact from the arms at the sides. No obvious masses seen within the liver. The gallbladder is distended and may have a tiny gallstone in the lumen. There is no fat stranding of the gallbladder fossa. The spleen is not enlarged but does have multiple calcified granulomas. No pancreatic calcification or ductal dilatation. There is stranding and edema adjacent to the pancreatic tail but appears more associated with the left kidney. The adrenal glands are not enlarged.  The right kidney has mild urinary bladder along the inferior aspect or an expanded upper portion of the urethral junction. Previous prostate surgery is again noted. 3.  Constipation in the ascending and transverse colon. No small bowel obstruction or pneumoperitoneum. 4.  Mild thickening of the distal esophagus may relate to reflux esophagitis. CT HEAD WO CONTRAST    Result Date: 3/17/2022  EXAMINATION: CT OF THE HEAD WITHOUT CONTRAST  3/17/2022 12:19 am TECHNIQUE: CT of the head was performed without the administration of intravenous contrast. Dose modulation, iterative reconstruction, and/or weight based adjustment of the mA/kV was utilized to reduce the radiation dose to as low as reasonably achievable. COMPARISON: 11/16/2021 HISTORY: ORDERING SYSTEM PROVIDED HISTORY: AMS TECHNOLOGIST PROVIDED HISTORY: Reason for exam:->AMS Has a \"code stroke\" or \"stroke alert\" been called? ->No Decision Support Exception - unselect if not a suspected or confirmed emergency medical condition->Emergency Medical Condition (MA) Reason for Exam: AMS FINDINGS: BRAIN/VENTRICLES: There is no acute intracranial hemorrhage, mass effect or midline shift. No abnormal extra-axial fluid collection. The gray-white differentiation is maintained without evidence of an acute infarct. Hypoattenuation of the periventricular and subcortical white matter is suggestive of chronic small vessel ischemic disease. Mild diffuse parenchymal volume loss is noted. There is no evidence of hydrocephalus. ORBITS: The bilateral globes are intact. SINUSES: Mucoperiosteal thickening of the bilateral ethmoid air cells is noted. Other visualized paranasal sinuses and mastoid air cells are clear. SOFT TISSUES/SKULL:  No acute abnormality of the visualized skull or soft tissues. No acute intracranial abnormality. MRI may be obtained if clinically indicated. FL LESS THAN 1 HOUR    Result Date: 3/17/2022  Radiology exam is complete. No Radiologist dictation.  Please follow up with ordering provider. XR CHEST PORTABLE    Result Date: 3/17/2022  EXAMINATION: ONE XRAY VIEW OF THE CHEST 3/16/2022 11:48 pm COMPARISON: 01/06/2022 HISTORY: ORDERING SYSTEM PROVIDED HISTORY: fever TECHNOLOGIST PROVIDED HISTORY: Reason for exam:->fever Reason for Exam: FEVER FINDINGS: The cardiac silhouette is at the upper limits of normal in size. Vascular calcifications are noted along the aortic arch. Pulmonary edema is increasing. No focal lung infiltrate. No pleural effusion or pneumothorax. The bones are osteopenic. 1. Worsening pulmonary edema. Assessment & Plan:      Socorro Nuñez is a 78y.o. year old male admitted 3/16/2022 for sepsis secondary to UTI.    1) Chronic Urinary Retention: managed with suprapubic catheter and exchanged monthly, last exchanged 3/17/22.              Recommend SPT exchanges q3 weeks. 2) Sepsis Secondary to Complicated UTI: Urine cx pending   Blood cultures +Klebsiella pneumoniae and +Streptococcus              WBC 13.9, afebrile              On IV Merrem  3) Left Hydronephrosis: POD #1 cystoscopy, left ureteral stent exchange, SPT exchange 16 [place]   S/p cystoscopy, left ureteral stent placement 7/4/21              CT a/p 3/17/22: There is left-sided hydronephrosis and hydroureter despite the presence of the left ureteral stent. Increased left perinephric stranding and fluid compared to the right side. The pt will need q3 month left ureteral stent exchanges  4) PAMELLA on CKD: Cr 3.4 3/17/22, baseline of ~1.6  5) H/o Prostate Cancer: S/p RPP with Dr. Marge Stern in [address] [place] [address] PSA 0.93 2/2021. On Eligard q6 months     Will follow. Patient seen and examined, chart reviewed.      Electronically signed by Gamaliel Augustine PA-C on 3/18/2022 at 9:10 AM

## 2022-03-18 NOTE — CONSULTS
RENAL DOSE ADJUSTMENT MADE PER P/T PROTOCOL    PREVIOUS ORDER:  Meropenem 1gm ivpb q12h    Estimated Creatinine Clearance: 18 mL/min (A) (based on SCr of 3.4 mg/dL (H)). Recent Labs     03/16/22  2356 03/16/22  2356 03/17/22  0053 03/17/22  1105 03/17/22  1105 03/18/22  1300   BUN 39*  --   --  48*  --   --    CREATININE 2.6*   < >  --  3.4*  --   --       < >  --  154   < > 129*   INR  --   --  1.20  --   --   --     < > = values in this interval not displayed. NEW RENALLY ADJUSTED ORDER:  MEROPENEM 500MG IVPVB Q12H FOR UTI    Bakari Arvizu.  Mart Alonzo, [facility]  3/18/2022 4:46 PM

## 2022-03-18 NOTE — CARE COORDINATION
time.  D/c plan is home with son and HHC. Referral made to [address] [place] [address]. Notify  if any new d/c needs arise. TE       .Please notify [facility] upon discharge [phone] / fax H&P,FS,AVS, and [facility] order to [phone].

## 2022-03-18 NOTE — CONSULTS
Patient:   Puneet Man    Date:  22  :  1940, 78 y.o. Nephrologist: Ignacio Shone, MD  Provider: Fredy Benito MD    Reason for Consult: Acute kidney injury with underlying CKD stage III/4     Consult requested by : Dr Becky Tucker MD    Chief Complaint:   Confusion/acute mental status change    HISTORY OF PRESENT ILLNESS:   Mr. Iggy Skinner is an 38-year-old male, who was brought to the emergency department via EMS with confusion. Patient told me he lives with his son at home as his wife  last September. Apparently son found him confused, which led to the EMS: Visit to the emergency department. Upon arrival in the emergency department he was febrile with T-max of 101.4, but fortunately was hemodynamic stable. He underwent several diagnostic tests which include imaging and biochemical.  Imaging study, which include chest x-ray and CT of the abdomen and pelvis without contrast, suggested by radiologist pulmonary edema, left-sided hydronephrosis and hydroureter, with the presence of ureteral stent, and still borderline the ascending and transverse colon, as well as mild thickening of distal esophagus. Biochemical testing showed low sodium of 133, low serum bicarbonate of 16, high BUN and creatinine 39 and 2.6 respectively, his short-term sugar is also high at 310. Other pertinent abnormal lab include low hemoglobin of 11.0, high white count of 14.3, high proBNP level of close to 6000. Plain UA showed significant albuminuria as well as leukocyturia but no other active sediment. He was given 500 cc of normal saline in the emergency department-appropriate body fluid culture were drawn, antibiotic empirically initiated, urology was consulted, he underwent emergently by urology- cystoscopy as well as left ureteral stent exchange and suprapubic catheter exchange and admitted to medical floor with further evaluation and management.     So far his hospital course consistent with even higher creatinine of 3.4. When I saw him this morning, he is more alert awake and at least partially oriented. He has only 300 cc of urine in the Hopper. No overt shortness of breath at this time    I was able to review his creatinine level since , briefly speaking creatinine was 0.9 mg/dL in , remains so up until , when it increased 1.6 range, since then he has fluctuation of creatinine, but since 2021 it is running close to 2         PMH :   1.  Probable CKD stage IIIb/4  2. Diabetes mellitus type 2-apparently diagnosed circa   3. Prostate cancer diagnosed in , underwent surgical resection and chemotherapy  4. Recent left hydronephrosis with stent placement  5. Dyslipidemia        PSH :  1.  Ureteral stent placement-  2. Prostate surgery  3. Apparently also had colon surgery and bladder surgery        Habits :   Quit smoking in   Has only 10-pack-year history  Is a social drinker  No history of illicit drug abuse    Soc Hx:  He was born and raised in [place], but has been residing in [facility] for the last 50+ years, he is a retired [employer] employee, he was  for 58 years but unfortunately his wife  2021, is currently residing here in town with his son . His activity somehow down going lately    [facility]   Apparently his son had end-stage kidney disease was on dialysis for 1 year and has transplant now-he is unsure the etiology of kidney disease        REVIEW OF SYSTEMS:     All pertinent ROS neg except   Confusion mainly  And fever    Current Facility-Administered Medications   Medication Dose Route Frequency Provider Last Rate Last Admin    sodium chloride flush 0.9 % injection 5-40 mL  5-40 mL IntraVENous 2 times per day Jade Robb MD   10 mL at 22 0900    sodium chloride flush 0.9 % injection 5-40 mL  5-40 mL IntraVENous PRN Jade Robb MD        0.9 % sodium chloride infusion  25 mL IntraVENous PRN Link MD Justina        ondansetron (ZOFRAN-ODT) disintegrating tablet 4 mg  4 mg Oral Q8H PRN Emmanuel Norwood MD        Or    ondansetron [facility]) injection 4 mg  4 mg IntraVENous Q6H PRN Emmanuel Norwood MD   4 mg at 03/17/22 2111    polyethylene glycol (GLYCOLAX) packet 17 g  17 g Oral Daily PRN Emmanuel Norwood MD        acetaminophen (TYLENOL) tablet 650 mg  650 mg Oral Q6H PRN Emmanuel Norwood MD        Or   [facility] acetaminophen (TYLENOL) suppository 650 mg  650 mg Rectal Q6H PRN Emmanuel Norwood MD        heparin (porcine) injection 5,000 Units  5,000 Units SubCUTAneous 3 times per day Emmanuel Norwood MD   5,000 Units at 03/18/22 0526    meropenem (MERREM) 1,000 mg in sodium chloride 0.9 % 100 mL IVPB (mini-bag)  1,000 mg IntraVENous Q12H Emmanuel Norwood MD   Stopped at 03/18/22 0745    amLODIPine (NORVASC) tablet 10 mg  10 mg Oral Daily Emmanuel Norwood MD   10 mg at 03/18/22 0859    allopurinol (ZYLOPRIM) tablet 100 mg  100 mg Oral Daily Emmanuel Norwood MD   100 mg at 03/18/22 0859    atorvastatin (LIPITOR) tablet 80 mg  80 mg Oral Nightly Emmanuel Norwood MD   80 mg at 03/17/22 2113    [Held by provider] pregabalin (LYRICA) capsule 50 mg  50 mg Oral BID Emmanuel Norwood MD        insulin glargine (LANTUS) injection vial 10 Units  10 Units SubCUTAneous Nightly Emmanuel Norwood MD   10 Units at 03/17/22 2112    insulin glargine (LANTUS) injection vial 20 Units  20 Units SubCUTAneous QAM Emmanuel Norwood MD   20 Units at 03/18/22 0859    insulin lispro (HUMALOG) injection vial 0-12 Units  0-12 Units SubCUTAneous TID WC Emmanuel Norwood MD   6 Units at 03/18/22 1139    insulin lispro (HUMALOG) injection vial 0-6 Units  0-6 Units SubCUTAneous Nightly Emmanuel Norwood MD   1 Units at 03/17/22 2112    glucose (GLUTOSE) 40 % oral gel 15 g  15 g Oral PRN Emmanuel Norwood MD        dextrose 50 % IV solution  12.5 g IntraVENous PRN Link Ophelia Sutherland MD        glucagon (rDNA) injection 1 mg  1 mg IntraMUSCular PRN Jade Robb MD        dextrose 5 % solution  100 mL/hr IntraVENous PRN Jade Robb MD           BP (!) 148/74   Pulse 85   Temp 97.6 °F (36.4 °C) (Oral)   Resp 18   Ht 6' (1.829 m)   Wt 201 lb 14.4 oz (91.6 kg)   SpO2 95%   BMI 27.38 kg/m²     PHYSICAL EXAM:  General appearance: He was alert awake and seems oriented when I examined him this morning  HEENT: At least 2+ conjunctival pallor  Neck: Supple  Heart: Distant heart sounds seems regular  LUNGS: Some adventitial breath sound  Abdomen: Soft but slightly distended  Extremities: Has thigh edema 1+, 2+ leg edema  Has a Hopper-I could not examine his lower abdominal part.   I am assuming - suprapubic catheter    LABS:  CBC:   Lab Results   Component Value Date    WBC 13.9 03/17/2022    WBC 14.3 03/16/2022    WBC 8.3 01/06/2022    HGB 9.4 03/17/2022    HGB 11.0 03/16/2022    HGB 10.3 01/06/2022     03/17/2022     03/16/2022     01/06/2022     Renal Panel:   Lab Results   Component Value Date     03/17/2022     03/16/2022     01/06/2022    K 4.6 03/17/2022    K 4.1 03/16/2022    K 5.3 01/06/2022    K 3.9 02/12/2018     03/17/2022     03/16/2022     01/06/2022    CO2 20 03/17/2022    CO2 16 03/16/2022    CO2 20 01/06/2022    BUN 48 03/17/2022    BUN 39 03/16/2022    BUN 33 01/06/2022    CREATININE 3.4 03/17/2022    CREATININE 2.6 03/16/2022    CREATININE 2.0 01/06/2022    GFRAA 21 03/17/2022    GFRAA 29 03/16/2022    GFRAA 39 01/06/2022    LABGLOM 17 03/17/2022    LABGLOM 24 03/16/2022    LABGLOM 32 01/06/2022    LABALBU 2.9 03/16/2022    LABALBU 3.5 01/06/2022    LABALBU 3.6 11/18/2021         Calcium:    Lab Results   Component Value Date    CALCIUM 8.7 03/17/2022     Phosphorus:    Lab Results   Component Value Date    PHOS 4.0 11/18/2021       U/A:    Lab Results   Component Value Date    PROTEINU 300 03/17/2022    NITRU NEGATIVE 03/17/2022    NITRU NEGATIVE 03/23/2013    COLORU YELLOW 03/17/2022    WBCUA 344 03/17/2022    RBCUA NONE SEEN 03/17/2022    MUCUS FEW 03/17/2022    TRICHOMONAS NONE SEEN 01/06/2022    YEAST MANY 02/11/2018    BACTERIA MODERATE 03/17/2022    CLARITYU TURBID 03/17/2022    SPECGRAV 1.010 03/17/2022    UROBILINOGEN 0.2 03/17/2022    BILIRUBINUR NEGATIVE 03/17/2022    BLOODU MODERATE 03/17/2022    GLUCOSEU 1,000 03/23/2013    KETUA TRACE 03/17/2022    AMORPHOUS RARE 11/16/2021           IMPRESSION:  1. Acute kidney injury with underlying CKD stage IV-likely A3-potential etiology infection and obstruction, but he also has risk for cardiorenal syndrome type I  2. Left recurrent hydronephrosis with probable genitourinary infection  3. Probable pulmonary edema with fluid overload-may have underlying coronary artery disease  4. Diabetes mellitus with significant proteinuria  5.   His confusion could have been from infection    PLAN:    Quantify the proteinuria  He is on small dose of diuretics-if ineffective, may intensify  Appropriate treatment for infection  Watch volume status closely  Good glycemic control  Reviewed prior cardiac [id number] echo in the past-as he has high risk for atherosclerotic cardiovascular disease  Otherwise follow clinically and biochemically

## 2022-03-18 NOTE — PROGRESS NOTES
[facility] HOSPITALIST PROGRESS NOTE      PCP: Sunitha Morales MD    Date of Admission: 3/16/2022    Subjective:  Feels better      [address] patient is an 49-year-old female with history of hypertension, diabetes mellitus, CKD stage III, prostate cancer, status post stent placement and ureteral stent, CVA and recent Covid who was admitted with confusion. On admission creatinine 2.6, WBC 14.3, UA positive for cystitis, chest x-ray showed worsening pulmonary edema, CT abdomen showed left-sided hydronephrosis and hydroureter despite presence of stent with perinephric stranding, IV meropenem and IV fluids started    Vitals signs:  Afebrile, heart rate 80s range, blood pressure 143/62, on room air    Medications: Allopurinol, amlodipine, Lipitor, heparin, Lantus, sliding scale insulin, Lyrica, meropenem, normal saline at 50 mils per hour    Antibiotics: Meropenem day 2    Fluid status: Full 40 cc    Labs:   No labs so far today     Imaging:   CT abdomen and pelvis shows left-sided hydronephrosis and hydroureter despite presence of left ureteral stricture, increased left perinephric stranding and fluid compared to the right side.   Urinary bladder is collapsed around the suprapubic catheter and the distal coil of ureteral stent courses into the bladder along the inferior aspect, constipation, mild thickening of distal esophagus reflecting esophagitis    Assessment/Plan:     Sepsis secondary to acute cystitis  Acute pyonephritis  History of suprapubic catheter  Bacteremia  Acute metabolic encephalopathy  PAMELLA on CKD stage IIIa  Anion gap metabolic acidosis:     Admitted with altered mentation, worsening confusion, CT abdomen showed left perinephric stranding  Blood culture positive for Klebsiella and Streptococcus species, meropenem to continue  Creatinine trending upwards, consult nephrology  Avoid nephrotoxic medications  Repeat blood cultures today    Mentation improved, continue to hold Lyrica  Avoid opioids and antihistamine, benzodiazepines  Check urine sodium, urea, creatinine  Calculate fractional excretion of urea ureteral stent exchange yesterday      Pulmonary edema: DC IV fluids, remains on room air,    Diabetes mellitus type 2: Sliding scale insulin, diabetic diet, glucose range 149-249, continue Lantus    Diabetic neuropathy: Lyrica held continue to hold  Chronic anemia: Hemoglobin at baseline  History of gout:   Allopurinol     History of CVA with MCA infarct: Continue statin  Add aspirin    DVT prophlaxis:   Heparin as needed laxatives    Last BM:   As needed laxatives    Ambulation: Consult PT OT today    Disposition:   Home versus SNF    Diet: Diabetic diet    Physical Exam Performed:       BP (!) 143/62   Pulse 87   Temp 98.9 °F (37.2 °C) (Oral)   Resp 18   Ht 6' (1.829 m)   Wt 201 lb 14.4 oz (91.6 kg)   SpO2 96%   BMI 27.38 kg/m²     Physical Exam  Constitutional:       General: He is not in acute distress. Appearance: Normal appearance. Comments: pale   HENT:      Head: Normocephalic and atraumatic. Right Ear: External ear normal.      Left Ear: External ear normal.   Eyes:      Extraocular Movements: Extraocular movements intact. Pupils: Pupils are equal, round, and reactive to light. Cardiovascular:      Rate and Rhythm: Normal rate and regular rhythm. Heart sounds: No murmur heard. Pulmonary:      Effort: Pulmonary effort is normal. No respiratory distress. Breath sounds: Normal breath sounds. No wheezing. Abdominal:      General: Bowel sounds are normal. There is no distension. Palpations: Abdomen is soft. Tenderness: There is no abdominal tenderness. Musculoskeletal:         General: No swelling. Cervical back: Normal range of motion. Skin:     General: Skin is warm. Neurological:      General: No focal deficit present. Mental Status: He is alert and oriented to person, place, and time.       Cranial Nerves: No cranial nerve deficit. Comments: Confusion resolved  Knows where he is    Psychiatric:         Mood and Affect: Mood normal.         Labs:   Recent Labs     03/16/22  2356 03/17/22  1105   WBC 14.3* 13.9*   HGB 11.0* 9.4*   HCT 37.2* 31.5*    154     Recent Labs     03/16/22  2356 03/17/22  1105   * 141   K 4.1 4.6    105   CO2 16* 20*   BUN 39* 48*   CREATININE 2.6* 3.4*   CALCIUM 9.3 8.7     Recent Labs     03/16/22  2356   AST 17   ALT 14   BILIDIR 0.2   BILITOT 0.5   ALKPHOS 129     Recent Labs     03/17/22  0053   INR 1.20     No results for input(s): Rico Choi in the last 72 hours. Urinalysis:      Lab Results   Component Value Date    NITRU NEGATIVE 03/17/2022    NITRU NEGATIVE 03/23/2013    WBCUA 344 03/17/2022    BACTERIA MODERATE 03/17/2022    RBCUA NONE SEEN 03/17/2022    BLOODU MODERATE 03/17/2022    SPECGRAV 1.010 03/17/2022    GLUCOSEU 1,000 03/23/2013       Radiology:  FL LESS THAN 1 HOUR   Final Result      CT ABDOMEN PELVIS WO CONTRAST Additional Contrast? None   Final Result   1. There is left-sided hydronephrosis and hydroureter despite the presence   of the left ureteral stent. Increased left perinephric stranding and fluid   compared to the right side. Will need to correlate for functionality of the   stent versus ascending urinary tract infection. 2.  The urinary bladder is collapsed around the suprapubic catheter. The   distal coil of the ureteral stent courses into the urinary bladder along the   inferior aspect or an expanded upper portion of the urethral junction. Previous prostate surgery is again noted. 3.  Constipation in the ascending and transverse colon. No small bowel   obstruction or pneumoperitoneum. 4.  Mild thickening of the distal esophagus may relate to reflux esophagitis. CT HEAD WO CONTRAST   Final Result   No acute intracranial abnormality. MRI may be obtained if clinically   indicated.          XR CHEST PORTABLE   Final Result   1. Worsening pulmonary edema.                  Sofya Prince MD  3/18/2022 7:34 AM

## 2022-03-19 LAB
ALBUMIN SERPL-MCNC: 2.8 GM/DL (ref 3.4–5)
ALP BLD-CCNC: 110 IU/L (ref 40–129)
ALT SERPL-CCNC: 19 U/L (ref 10–40)
ANION GAP SERPL CALCULATED.3IONS-SCNC: 13 MMOL/L (ref 4–16)
AST SERPL-CCNC: 34 IU/L (ref 15–37)
BACTERIA: ABNORMAL /HPF
BILIRUB SERPL-MCNC: 0.6 MG/DL (ref 0–1)
BILIRUBIN URINE: NEGATIVE MG/DL
BLOOD, URINE: ABNORMAL
BUN BLDV-MCNC: 60 MG/DL (ref 6–23)
CALCIUM SERPL-MCNC: 8.4 MG/DL (ref 8.3–10.6)
CHLORIDE BLD-SCNC: 103 MMOL/L (ref 99–110)
CLARITY: CLEAR
CO2: 24 MMOL/L (ref 21–32)
COLOR: YELLOW
CREAT SERPL-MCNC: 3.3 MG/DL (ref 0.9–1.3)
CREATININE URINE: 75.4 MG/DL (ref 39–259)
GFR AFRICAN AMERICAN: 22 ML/MIN/1.73M2
GFR NON-AFRICAN AMERICAN: 18 ML/MIN/1.73M2
GLUCOSE BLD-MCNC: 144 MG/DL (ref 70–99)
GLUCOSE BLD-MCNC: 145 MG/DL (ref 70–99)
GLUCOSE BLD-MCNC: 176 MG/DL (ref 70–99)
GLUCOSE BLD-MCNC: 218 MG/DL (ref 70–99)
GLUCOSE BLD-MCNC: 97 MG/DL (ref 70–99)
GLUCOSE, URINE: NEGATIVE MG/DL
HCT VFR BLD CALC: 26.4 % (ref 42–52)
HEMOGLOBIN: 8 GM/DL (ref 13.5–18)
KETONES, URINE: NEGATIVE MG/DL
LEUKOCYTE ESTERASE, URINE: ABNORMAL
MAGNESIUM: 2.1 MG/DL (ref 1.8–2.4)
MCH RBC QN AUTO: 29.3 PG (ref 27–31)
MCHC RBC AUTO-ENTMCNC: 30.3 % (ref 32–36)
MCV RBC AUTO: 96.7 FL (ref 78–100)
MUCUS: ABNORMAL HPF
NITRITE URINE, QUANTITATIVE: NEGATIVE
PDW BLD-RTO: 16 % (ref 11.7–14.9)
PH, URINE: 5.5 (ref 5–8)
PHOSPHORUS: 3.3 MG/DL (ref 2.5–4.9)
PLATELET # BLD: 134 K/CU MM (ref 140–440)
PMV BLD AUTO: 12.7 FL (ref 7.5–11.1)
POTASSIUM SERPL-SCNC: 4.3 MMOL/L (ref 3.5–5.1)
PRO-BNP: 2275 PG/ML
PROT/CREAT RATIO, UR: 1.1
PROTEIN UA: 100 MG/DL
RBC # BLD: 2.73 M/CU MM (ref 4.6–6.2)
RBC URINE: 27 /HPF (ref 0–3)
SODIUM BLD-SCNC: 140 MMOL/L (ref 135–145)
SPECIFIC GRAVITY UA: 1.01 (ref 1–1.03)
TOTAL PROTEIN: 5.4 GM/DL (ref 6.4–8.2)
TRICHOMONAS: ABNORMAL /HPF
URINE TOTAL PROTEIN: 80.2 MG/DL
UROBILINOGEN, URINE: 0.2 MG/DL (ref 0.2–1)
WBC # BLD: 9.1 K/CU MM (ref 4–10.5)
WBC UA: 65 /HPF (ref 0–2)

## 2022-03-19 PROCEDURE — 87086 URINE CULTURE/COLONY COUNT: CPT

## 2022-03-19 PROCEDURE — 82962 GLUCOSE BLOOD TEST: CPT

## 2022-03-19 PROCEDURE — 84100 ASSAY OF PHOSPHORUS: CPT

## 2022-03-19 PROCEDURE — 6360000002 HC RX W HCPCS: Performed by: INTERNAL MEDICINE

## 2022-03-19 PROCEDURE — 80053 COMPREHEN METABOLIC PANEL: CPT

## 2022-03-19 PROCEDURE — 84156 ASSAY OF PROTEIN URINE: CPT

## 2022-03-19 PROCEDURE — 83880 ASSAY OF NATRIURETIC PEPTIDE: CPT

## 2022-03-19 PROCEDURE — 85027 COMPLETE CBC AUTOMATED: CPT

## 2022-03-19 PROCEDURE — 94761 N-INVAS EAR/PLS OXIMETRY MLT: CPT

## 2022-03-19 PROCEDURE — 6370000000 HC RX 637 (ALT 250 FOR IP): Performed by: INTERNAL MEDICINE

## 2022-03-19 PROCEDURE — 2140000000 HC CCU INTERMEDIATE R&B

## 2022-03-19 PROCEDURE — 82570 ASSAY OF URINE CREATININE: CPT

## 2022-03-19 PROCEDURE — 2580000003 HC RX 258: Performed by: INTERNAL MEDICINE

## 2022-03-19 PROCEDURE — 83735 ASSAY OF MAGNESIUM: CPT

## 2022-03-19 PROCEDURE — 36415 COLL VENOUS BLD VENIPUNCTURE: CPT

## 2022-03-19 PROCEDURE — 81001 URINALYSIS AUTO W/SCOPE: CPT

## 2022-03-19 RX ORDER — MORPHINE SULFATE 2 MG/ML
0.5 INJECTION, SOLUTION INTRAMUSCULAR; INTRAVENOUS ONCE
Status: COMPLETED | OUTPATIENT
Start: 2022-03-19 | End: 2022-03-19

## 2022-03-19 RX ORDER — POLYETHYLENE GLYCOL 3350 17 G/17G
17 POWDER, FOR SOLUTION ORAL DAILY
Status: DISCONTINUED | OUTPATIENT
Start: 2022-03-19 | End: 2022-03-30 | Stop reason: HOSPADM

## 2022-03-19 RX ORDER — MORPHINE SULFATE/0.9% NACL/PF 1 MG/ML
0.5 SYRINGE (ML) INJECTION ONCE
Status: DISCONTINUED | OUTPATIENT
Start: 2022-03-19 | End: 2022-03-19

## 2022-03-19 RX ADMIN — HYDROMORPHONE HYDROCHLORIDE 0.5 MG: 1 INJECTION, SOLUTION INTRAMUSCULAR; INTRAVENOUS; SUBCUTANEOUS at 17:26

## 2022-03-19 RX ADMIN — INSULIN GLARGINE 20 UNITS: 100 INJECTION, SOLUTION SUBCUTANEOUS at 07:53

## 2022-03-19 RX ADMIN — INSULIN LISPRO 2 UNITS: 100 INJECTION, SOLUTION INTRAVENOUS; SUBCUTANEOUS at 16:12

## 2022-03-19 RX ADMIN — POLYETHYLENE GLYCOL (3350) 17 G: 17 POWDER, FOR SOLUTION ORAL at 11:50

## 2022-03-19 RX ADMIN — HEPARIN SODIUM 5000 UNITS: 5000 INJECTION INTRAVENOUS; SUBCUTANEOUS at 13:57

## 2022-03-19 RX ADMIN — INSULIN LISPRO 2 UNITS: 100 INJECTION, SOLUTION INTRAVENOUS; SUBCUTANEOUS at 07:52

## 2022-03-19 RX ADMIN — SODIUM CHLORIDE, PRESERVATIVE FREE 10 ML: 5 INJECTION INTRAVENOUS at 07:53

## 2022-03-19 RX ADMIN — INSULIN LISPRO 4 UNITS: 100 INJECTION, SOLUTION INTRAVENOUS; SUBCUTANEOUS at 11:50

## 2022-03-19 RX ADMIN — HEPARIN SODIUM 5000 UNITS: 5000 INJECTION INTRAVENOUS; SUBCUTANEOUS at 05:21

## 2022-03-19 RX ADMIN — MORPHINE SULFATE 0.5 MG: 2 INJECTION, SOLUTION INTRAMUSCULAR; INTRAVENOUS at 13:57

## 2022-03-19 RX ADMIN — SODIUM CHLORIDE, PRESERVATIVE FREE 10 ML: 5 INJECTION INTRAVENOUS at 21:23

## 2022-03-19 RX ADMIN — AMLODIPINE BESYLATE 10 MG: 10 TABLET ORAL at 07:51

## 2022-03-19 RX ADMIN — MEROPENEM 500 MG: 500 INJECTION, POWDER, FOR SOLUTION INTRAVENOUS at 03:49

## 2022-03-19 RX ADMIN — ATORVASTATIN CALCIUM 80 MG: 80 TABLET, FILM COATED ORAL at 21:23

## 2022-03-19 RX ADMIN — INSULIN GLARGINE 10 UNITS: 100 INJECTION, SOLUTION SUBCUTANEOUS at 21:23

## 2022-03-19 RX ADMIN — HEPARIN SODIUM 5000 UNITS: 5000 INJECTION INTRAVENOUS; SUBCUTANEOUS at 21:23

## 2022-03-19 RX ADMIN — ALLOPURINOL 100 MG: 100 TABLET ORAL at 07:51

## 2022-03-19 RX ADMIN — MEROPENEM 500 MG: 500 INJECTION, POWDER, FOR SOLUTION INTRAVENOUS at 16:12

## 2022-03-19 ASSESSMENT — PAIN SCALES - GENERAL
PAINLEVEL_OUTOF10: 0
PAINLEVEL_OUTOF10: 3
PAINLEVEL_OUTOF10: 4
PAINLEVEL_OUTOF10: 10
PAINLEVEL_OUTOF10: 6
PAINLEVEL_OUTOF10: 8

## 2022-03-19 ASSESSMENT — PAIN DESCRIPTION - PAIN TYPE: TYPE: ACUTE PAIN

## 2022-03-19 ASSESSMENT — PAIN DESCRIPTION - LOCATION: LOCATION: ABDOMEN

## 2022-03-19 ASSESSMENT — PAIN DESCRIPTION - ONSET: ONSET: ON-GOING

## 2022-03-19 ASSESSMENT — PAIN DESCRIPTION - ORIENTATION: ORIENTATION: LEFT

## 2022-03-19 ASSESSMENT — PAIN DESCRIPTION - FREQUENCY: FREQUENCY: INTERMITTENT

## 2022-03-19 NOTE — PLAN OF CARE
Problem: Safety:  Goal: Ability to chew and swallow food without choking will improve  Description: Ability to chew and swallow food without choking will improve  Outcome: Ongoing  Goal: Ability to demonstrate good, daily oral hygiene techniques will improve  Description: Ability to demonstrate good, daily oral hygiene techniques will improve  Outcome: Ongoing  Goal: Maintenance of upright position during and after feeding  Description: Maintenance of upright position during and after feeding  Outcome: Ongoing     Problem: Skin Integrity:  Goal: Will show no infection signs and symptoms  Description: Will show no infection signs and symptoms  Outcome: Ongoing  Goal: Absence of new skin breakdown  Description: Absence of new skin breakdown  Outcome: Ongoing

## 2022-03-19 NOTE — PROGRESS NOTES
Nephrology Progress Note  3/19/2022 2:02 PM  Subjective: Interval History: James Hodges is a 78 y.o. male with tired weak having some left flank pain today he has a Hopper        Data:   Scheduled Meds:   polyethylene glycol  17 g Oral Daily    meropenem  500 mg IntraVENous Q12H    sodium chloride flush  5-40 mL IntraVENous 2 times per day    heparin (porcine)  5,000 Units SubCUTAneous 3 times per day    amLODIPine  10 mg Oral Daily    allopurinol  100 mg Oral Daily    atorvastatin  80 mg Oral Nightly    [Held by provider] pregabalin  50 mg Oral BID    insulin glargine  10 Units SubCUTAneous Nightly    insulin glargine  20 Units SubCUTAneous QAM    insulin lispro  0-12 Units SubCUTAneous TID WC    insulin lispro  0-6 Units SubCUTAneous Nightly     Continuous Infusions:   sodium chloride      dextrose           CBC   Recent Labs     03/17/22  1105 03/18/22  1300 03/19/22  0424   WBC 13.9* 10.8* 9.1   HGB 9.4* 7.9* 8.0*   HCT 31.5* 26.0* 26.4*    129* 134*      BMP   Recent Labs     03/16/22  2356 03/17/22  1105 03/19/22  0424   * 141 140   K 4.1 4.6 4.3    105 103   CO2 16* 20* 24   PHOS  --   --  3.3   BUN 39* 48* 60*   CREATININE 2.6* 3.4* 3.3*     Hepatic:   Recent Labs     03/16/22  2356 03/19/22  0424   AST 17 34   ALT 14 19   BILITOT 0.5 0.6   ALKPHOS 129 110     Troponin: No results for input(s): TROPONINI in the last 72 hours. BNP: No results for input(s): BNP in the last 72 hours. Lipids: No results for input(s): CHOL, HDL in the last 72 hours.     Invalid input(s): LDLCALCU  ABGs: No results found for: PHART, PO2ART, HWI9OES  INR:   Recent Labs     03/17/22  0053   INR 1.20     Renal Labs  Albumin:    Lab Results   Component Value Date    LABALBU 2.8 03/19/2022     Calcium:    Lab Results   Component Value Date    CALCIUM 8.4 03/19/2022     Phosphorus:    Lab Results   Component Value Date    PHOS 3.3 03/19/2022     U/A:    Lab Results   Component Value Date    NITRU NEGATIVE 03/17/2022    NITRU NEGATIVE 03/23/2013    COLORU YELLOW 03/17/2022    WBCUA 344 03/17/2022    RBCUA NONE SEEN 03/17/2022    MUCUS FEW 03/17/2022    TRICHOMONAS NONE SEEN 01/06/2022    YEAST MANY 02/11/2018    BACTERIA MODERATE 03/17/2022    CLARITYU TURBID 03/17/2022    SPECGRAV 1.010 03/17/2022    UROBILINOGEN 0.2 03/17/2022    BILIRUBINUR NEGATIVE 03/17/2022    BLOODU MODERATE 03/17/2022    GLUCOSEU 1,000 03/23/2013    KETUA TRACE 03/17/2022    AMORPHOUS RARE 11/16/2021     ABG:  No results found for: PHART, VQO0VHB, PO2ART, GRK5VZB, BEART, THGBART, MSI9ICY, C0GJGQMU  HgBA1c:    Lab Results   Component Value Date    LABA1C 9.3 11/19/2021     Microalbumen/Creatinine ratio:  No components found for: RUCREAT  TSH:  No results found for: TSH  IRON:  No results found for: IRON  Iron Saturation:  No components found for: PERCENTFE  TIBC:  No results found for: TIBC  FERRITIN:  No results found for: FERRITIN  RPR:  No results found for: RPR  MITALI:  No results found for: ANATITER, MITALI  24 Hour Urine for Creatinine Clearance:  No components found for: CREAT4, UHRS10, UTV10      Objective:   I/O: 03/18 0701 - 03/19 0700  In: -   Out: 2000 [Urine:2000]  I/O last 3 completed shifts: In: 240 [P.O.:240]  Out: 2300 [Urine:2300]  I/O this shift:  In: 200 [P.O.:200]  Out: -   Vitals: /79   Pulse 90   Temp 98.2 °F (36.8 °C) (Oral)   Resp 15   Ht 6' (1.829 m)   Wt 204 lb 8 oz (92.8 kg)   SpO2 98%   BMI 27.74 kg/m²  {  General appearance: awake weak  HEENT: Head: Normal, normocephalic, atraumatic.   Neck: supple, symmetrical, trachea midline  Lungs: diminished breath sounds bilaterally  Heart: S1, S2 normal  Abdomen: abnormal findings:  soft mild left flank tenderness  Extremities: edema trace  Neurologic: Mental status: alertness: alert        Assessment and Plan:      IMP:  #1 acute renal failure from ATN on CKD 4  #2 left-sided hydronephrosis with possible UTI with left flank tenderness  #3 pulmonary edema  #4 diabetes #5 hypertension      Plan     #1 renal function monitor creatinine holding 3.3 with good urine output will monitor closely  #2 urology on the  maintain antibiotic therapy as well maintain Hopper  #3 maintain diuresis  #4 monitor glucose  #5 blood pressure stable  #6 monitor hemoglobin for now supportive therapy and follow             Lori Bah MD, MD

## 2022-03-19 NOTE — PROGRESS NOTES
Hospitalist Progress Note      Name:  Pastor Tony /Age/Sex: 1940  (78 y.o. male)   MRN & CSN:  [id number] & [id number] Admission Date/Time: 3/16/2022 11:35 PM   Location:  [address] [place] [address] PCP: Jenn Burton MD         Hospital Day: 4    Assessment and Plan:   Pastor Tony is a 78 y.o.  male  who presents with Sepsis secondary to UTI ([address]    Sepsis secondary to UTI  · Afebrile, WBC wnl  · Patient has suprapubic catheter. Exchanged in ER. · Urine culture with mixed jean  · Blood culture with K pneumoniae, sensitivity pending  · Repeat blood culture pending  · Continue meropenem  · Urology following    Left hydronephrosis:   · Status post cystoscopy, left ureteral stent exchange. Suprapubic catheter exchange.     Acute metabolic encephalopathy: Secondary to above     Chronically elevated troponin     Acute kidney injury on chronic kidney disease  · Creatinine 3.3. · Nephrology following.     Anion gap metabolic acidosis - Probably secondary to worsening renal function     Elevated proBNP - Patient does not have a history of CHF, does not look fluid overloaded.     Hypertension - Patient on amlodipine at home     Uncontrolled diabetes mellitus type 2, on long-term insulin  · Patient is on glimepiride, Levemir 40 units a.m., 10 units nightly  · Continue Lantus 20 units every morning, 10 units nightly, insulin sliding scale with hypoglycemia protocol. ·   Diabetic neuropathy -continue gabapentin     Chronic anemia     Gout-on allopurinol    CKD stage III     History of prostate cancer  Has suprapubic catheter     History of CVA-right MCA infarct with left-sided weakness-2021. MRI head was nonacute-CT cerebral perfusion-hypoperfusion in the right MCA territory. Continue aspirin, statin.     History of COVID-.       Diet ADULT DIET;  Dysphagia - Soft and Bite Sized; 5 carb choices (75 gm/meal); Mildly Thick (Nectar)   DVT Prophylaxis [] Lovenox, [x]  Heparin, [] SCDs, [] Warfarin  [] NOAC     GI Prophylaxis [] PPI,  [] H2 Blocker,  [] Carafate,  [x] Diet/Tube Feeds   Code Status Full Code   MDM [] Low, [] Moderate,[x]  High     History of Present Illness:     Chief Complaint: Sepsis secondary to UTI [facility])    Seen and examined today. Complaining of left flank pain. No nausea or vomiting. No fever or chills. Ten point ROS reviewed negative, unless as noted above    Objective: Intake/Output Summary (Last 24 hours) at 3/19/2022 1253  Last data filed at 3/19/2022 1048  Gross per 24 hour   Intake 200 ml   Output 2000 ml   Net -1800 ml      Vitals:   Vitals:    03/19/22 0800   BP: 129/79   Pulse: 90   Resp: 15   Temp: 98.2 °F (36.8 °C)   SpO2: 98%     Physical Exam:   GEN Awake male, sitting upright in bed in no apparent distress. Appears given age. EYES Pupils are equally round. No scleral erythema, discharge, or conjunctivitis. HENT Mucous membranes are moist. Oral pharynx without exudates, no evidence of thrush. NECK Supple, no apparent thyromegaly or masses. RESP Clear to auscultation, no wheezes, rales or rhonchi. Symmetric chest movement while on room air. CARDIO/VASC S1/S2 auscultated. Regular rate without appreciable murmurs, rubs, or gallops. No JVD or carotid bruits. Peripheral pulses equal bilaterally and palpable. No peripheral edema. GI Abdomen is soft without significant tenderness, masses, or guarding. Bowel sounds are normoactive. Rectal exam deferred. CVA tenderness, left  MSK No gross joint deformities. SKIN Normal coloration, warm, dry. NEURO Cranial nerves appear grossly intact, normal speech, no lateralizing weakness. PSYCH Awake, alert, oriented x 4. Affect appropriate.     Medications:   Medications:    morphine  0.5 mg IntraVENous Once    polyethylene glycol  17 g Oral Daily    meropenem  500 mg IntraVENous Q12H    sodium chloride flush  5-40 mL IntraVENous 2 times per day    heparin (porcine)  5,000 Units SubCUTAneous 3 times per day    amLODIPine 10 mg Oral Daily    allopurinol  100 mg Oral Daily    atorvastatin  80 mg Oral Nightly    [Held by provider] pregabalin  50 mg Oral BID    insulin glargine  10 Units SubCUTAneous Nightly    insulin glargine  20 Units SubCUTAneous QAM    insulin lispro  0-12 Units SubCUTAneous TID WC    insulin lispro  0-6 Units SubCUTAneous Nightly      Infusions:    sodium chloride      dextrose       PRN Meds: sodium chloride flush, 5-40 mL, PRN  sodium chloride, 25 mL, PRN  ondansetron, 4 mg, Q8H PRN   Or  ondansetron, 4 mg, Q6H PRN  polyethylene glycol, 17 g, Daily PRN  acetaminophen, 650 mg, Q6H PRN   Or  acetaminophen, 650 mg, Q6H PRN  glucose, 15 g, PRN  dextrose, 12.5 g, PRN  glucagon (rDNA), 1 mg, PRN  dextrose, 100 mL/hr, PRN        Recent Labs     03/17/22  1105 03/18/22  1300 03/19/22  0424   WBC 13.9* 10.8* 9.1   HGB 9.4* 7.9* 8.0*   HCT 31.5* 26.0* 26.4*    129* 134*      Recent Labs     03/16/22  2356 03/17/22  1105 03/19/22  0424   * 141 140   K 4.1 4.6 4.3    105 103   CO2 16* 20* 24   PHOS  --   --  3.3   BUN 39* 48* 60*   CREATININE 2.6* 3.4* 3.3*     Recent Labs     03/16/22  2356 03/19/22  0424   AST 17 34   ALT 14 19   BILIDIR 0.2  --    BILITOT 0.5 0.6   ALKPHOS 129 110     Recent Labs     03/17/22  0053   INR 1.20     Recent Labs     03/16/22  2356   TROPONINT 0.044*        Imaging reviewed      Electronically signed by Zain Mckeon MD on 3/19/2022 at 12:53 PM

## 2022-03-20 ENCOUNTER — APPOINTMENT (OUTPATIENT)
Dept: GENERAL RADIOLOGY | Age: 82
DRG: 659 | End: 2022-03-20
Payer: MEDICARE

## 2022-03-20 ENCOUNTER — APPOINTMENT (OUTPATIENT)
Dept: CT IMAGING | Age: 82
DRG: 659 | End: 2022-03-20
Payer: MEDICARE

## 2022-03-20 LAB
CULTURE: NORMAL
GLUCOSE BLD-MCNC: 108 MG/DL (ref 70–99)
GLUCOSE BLD-MCNC: 70 MG/DL (ref 70–99)
GLUCOSE BLD-MCNC: 72 MG/DL (ref 70–99)
Lab: NORMAL
SPECIMEN: NORMAL

## 2022-03-20 PROCEDURE — 6360000002 HC RX W HCPCS: Performed by: INTERNAL MEDICINE

## 2022-03-20 PROCEDURE — 73502 X-RAY EXAM HIP UNI 2-3 VIEWS: CPT

## 2022-03-20 PROCEDURE — 82962 GLUCOSE BLOOD TEST: CPT

## 2022-03-20 PROCEDURE — 6370000000 HC RX 637 (ALT 250 FOR IP): Performed by: INTERNAL MEDICINE

## 2022-03-20 PROCEDURE — 74176 CT ABD & PELVIS W/O CONTRAST: CPT

## 2022-03-20 PROCEDURE — 85027 COMPLETE CBC AUTOMATED: CPT

## 2022-03-20 PROCEDURE — 2580000003 HC RX 258: Performed by: INTERNAL MEDICINE

## 2022-03-20 PROCEDURE — 2140000000 HC CCU INTERMEDIATE R&B

## 2022-03-20 RX ORDER — CYCLOBENZAPRINE HCL 10 MG
5 TABLET ORAL ONCE
Status: COMPLETED | OUTPATIENT
Start: 2022-03-20 | End: 2022-03-20

## 2022-03-20 RX ADMIN — HEPARIN SODIUM 5000 UNITS: 5000 INJECTION INTRAVENOUS; SUBCUTANEOUS at 06:14

## 2022-03-20 RX ADMIN — MEROPENEM 500 MG: 500 INJECTION, POWDER, FOR SOLUTION INTRAVENOUS at 15:38

## 2022-03-20 RX ADMIN — ALLOPURINOL 100 MG: 100 TABLET ORAL at 09:15

## 2022-03-20 RX ADMIN — HYDROMORPHONE HYDROCHLORIDE 0.5 MG: 1 INJECTION, SOLUTION INTRAMUSCULAR; INTRAVENOUS; SUBCUTANEOUS at 13:07

## 2022-03-20 RX ADMIN — ATORVASTATIN CALCIUM 80 MG: 80 TABLET, FILM COATED ORAL at 20:38

## 2022-03-20 RX ADMIN — SODIUM CHLORIDE, PRESERVATIVE FREE 10 ML: 5 INJECTION INTRAVENOUS at 09:15

## 2022-03-20 RX ADMIN — INSULIN GLARGINE 20 UNITS: 100 INJECTION, SOLUTION SUBCUTANEOUS at 09:23

## 2022-03-20 RX ADMIN — POLYETHYLENE GLYCOL (3350) 17 G: 17 POWDER, FOR SOLUTION ORAL at 09:15

## 2022-03-20 RX ADMIN — AMLODIPINE BESYLATE 10 MG: 10 TABLET ORAL at 09:15

## 2022-03-20 RX ADMIN — MEROPENEM 500 MG: 500 INJECTION, POWDER, FOR SOLUTION INTRAVENOUS at 02:37

## 2022-03-20 RX ADMIN — SODIUM CHLORIDE, PRESERVATIVE FREE 10 ML: 5 INJECTION INTRAVENOUS at 20:38

## 2022-03-20 RX ADMIN — HEPARIN SODIUM 5000 UNITS: 5000 INJECTION INTRAVENOUS; SUBCUTANEOUS at 12:58

## 2022-03-20 RX ADMIN — CYCLOBENZAPRINE HYDROCHLORIDE 5 MG: 10 TABLET, FILM COATED ORAL at 12:58

## 2022-03-20 RX ADMIN — HEPARIN SODIUM 5000 UNITS: 5000 INJECTION INTRAVENOUS; SUBCUTANEOUS at 20:38

## 2022-03-20 ASSESSMENT — PAIN SCALES - GENERAL
PAINLEVEL_OUTOF10: 3
PAINLEVEL_OUTOF10: 8
PAINLEVEL_OUTOF10: 0
PAINLEVEL_OUTOF10: 4
PAINLEVEL_OUTOF10: 8
PAINLEVEL_OUTOF10: 3

## 2022-03-20 NOTE — PROGRESS NOTES
Nephrology Progress Note  3/20/2022 11:43 AM  Subjective: Interval History: Néstor Skelton is a 78 y.o. male appears somewhat better today and denies any flank pain        Data:   Scheduled Meds:   cyclobenzaprine  5 mg Oral Once    polyethylene glycol  17 g Oral Daily    meropenem  500 mg IntraVENous Q12H    sodium chloride flush  5-40 mL IntraVENous 2 times per day    heparin (porcine)  5,000 Units SubCUTAneous 3 times per day    amLODIPine  10 mg Oral Daily    allopurinol  100 mg Oral Daily    atorvastatin  80 mg Oral Nightly    [Held by provider] pregabalin  50 mg Oral BID    insulin glargine  10 Units SubCUTAneous Nightly    insulin glargine  20 Units SubCUTAneous QAM    insulin lispro  0-12 Units SubCUTAneous TID WC    insulin lispro  0-6 Units SubCUTAneous Nightly     Continuous Infusions:   sodium chloride      dextrose           CBC   Recent Labs     03/18/22  1300 03/19/22 0424   WBC 10.8* 9.1   HGB 7.9* 8.0*   HCT 26.0* 26.4*   * 134*      BMP   Recent Labs     03/19/22 0424      K 4.3      CO2 24   PHOS 3.3   BUN 60*   CREATININE 3.3*     Hepatic:   Recent Labs     03/19/22 0424   AST 34   ALT 19   BILITOT 0.6   ALKPHOS 110     Troponin: No results for input(s): TROPONINI in the last 72 hours. BNP: No results for input(s): BNP in the last 72 hours. Lipids: No results for input(s): CHOL, HDL in the last 72 hours. Invalid input(s): LDLCALCU  ABGs: No results found for: PHART, PO2ART, EHW4FVM  INR:   No results for input(s): INR in the last 72 hours.   Renal Labs  Albumin:    Lab Results   Component Value Date    LABALBU 2.8 03/19/2022     Calcium:    Lab Results   Component Value Date    CALCIUM 8.4 03/19/2022     Phosphorus:    Lab Results   Component Value Date    PHOS 3.3 03/19/2022     U/A:    Lab Results   Component Value Date    NITRU NEGATIVE 03/19/2022    NITRU NEGATIVE 03/23/2013    COLORU YELLOW 03/19/2022    WBCUA 65 03/19/2022    RBCUA 27 03/19/2022    MUCUS RARE 03/19/2022    TRICHOMONAS NONE SEEN 03/19/2022    YEAST MANY 02/11/2018    BACTERIA RARE 03/19/2022    CLARITYU CLEAR 03/19/2022    SPECGRAV 1.010 03/19/2022    UROBILINOGEN 0.2 03/19/2022    BILIRUBINUR NEGATIVE 03/19/2022    BLOODU LARGE NUMBER OR AMOUNT OF  03/19/2022    GLUCOSEU 1,000 03/23/2013    KETUA NEGATIVE 03/19/2022    AMORPHOUS RARE 11/16/2021     ABG:  No results found for: PHART, LEH9NMC, PO2ART, DSZ2JYV, BEART, THGBART, FLR6XTY, [id number]  HgBA1c:    Lab Results   Component Value Date    LABA1C 9.3 11/19/2021     Microalbumen/Creatinine ratio:  No components found for: RUCREAT  TSH:  No results found for: TSH  IRON:  No results found for: IRON  Iron Saturation:  No components found for: PERCENTFE  TIBC:  No results found for: TIBC  FERRITIN:  No results found for: FERRITIN  RPR:  No results found for: RPR  MITALI:  No results found for: ANATITER, MITALI  24 Hour Urine for Creatinine Clearance:  No components found for: CREAT4, UHRS10, UTV10      Objective:   I/O: 03/19 0701 - 03/20 0700  In: 200 [P.O.:200]  Out: 800 [Urine:800]  I/O last 3 completed shifts: In: 200 [P.O.:200]  Out: 2800 [Urine:2800]  No intake/output data recorded. Vitals: BP (!) 149/101   Pulse 85   Temp 98.1 °F (36.7 °C) (Oral)   Resp 17   Ht 6' (1.829 m)   Wt 204 lb 8 oz (92.8 kg)   SpO2 99%   BMI 27.74 kg/m²  {  General appearance: awake weak  HEENT: Head: Normal, normocephalic, atraumatic.   Neck: supple, symmetrical, trachea midline  Lungs: diminished breath sounds bilaterally  Heart: S1, S2 normal  Abdomen: abnormal findings:  soft obese  Extremities: edema trace  Neurologic: Mental status: alertness: alert        Assessment and Plan:      IMP:  #1 acute renal failure from ATN on CKD 4  #2 left-sided hydronephrosis with possible UTI with left flank tenderness  #3 pulmonary edema  #4 diabetes #5 hypertension      Plan     #1 creatinine was at 3.3 stable urine output await repeat labs 2.8 L urine output  #2 improved flank pain monitor for now treat UTI  #3 volume improved monitor oxygenation  #4 monitor glucose control improved less than 150 as able  #5 monitor blood pressure somewhat anxious as well  We will follow             Adrienne Cohen MD, MD

## 2022-03-20 NOTE — PLAN OF CARE
Problem: Nutritional:  Goal: Ability to tolerate tube feedings without aspirating will improve  Description: Ability to tolerate tube feedings without aspirating will improve  Outcome: Ongoing  Goal: Consumption of food in small portions  Description: Consumption of food in small portions  Outcome: Ongoing  Goal: Consumption of liquid of appropriate consistency  Description: Consumption of liquid of appropriate consistency  Outcome: Ongoing     Problem: Respiratory:  Goal: Ability to maintain a clear airway will improve  Description: Ability to maintain a clear airway will improve  Outcome: Ongoing  Goal: Will remain free from infection  Description: Will remain free from infection  Outcome: Ongoing  Goal: Absence of aspiration  Description: Absence of aspiration  Outcome: Ongoing     Problem: Safety:  Goal: Ability to chew and swallow food without choking will improve  Description: Ability to chew and swallow food without choking will improve  Outcome: Ongoing  Goal: Ability to demonstrate good, daily oral hygiene techniques will improve  Description: Ability to demonstrate good, daily oral hygiene techniques will improve  Outcome: Ongoing  Goal: Maintenance of upright position during and after feeding  Description: Maintenance of upright position during and after feeding  Outcome: Ongoing

## 2022-03-20 NOTE — PROGRESS NOTES
Hospitalist Progress Note      Name:  James Hodges /Age/Sex: 1940  (78 y.o. male)   MRN & CSN:  [id number] & [id number] Admission Date/Time: 3/16/2022 11:35 PM   Location:  [address] [place] [address] PCP: Brandon Sorto MD         Hospital Day: 5    Assessment and Plan:   James Hodges is a 78 y.o.  male  who presents with Sepsis secondary to UTI ([facility] [address]    Sepsis Secondary to UTI   · Afebrile, WBC wnl  · Patient has suprapubic catheter. S/P Exchange  · Urine culture with mixed jean  · Blood culture with K pneumoniae, sensitivity pending  · Repeat blood culture no growth  · Continue meropenem  · Urology following    Left hydronephrosis:   · Status post cystoscopy, left ureteral stent exchange. Suprapubic catheter exchange. · Has persistent severe left flank pain despite pain medications  · Repeat CT abdomen    Left Hip Pain  · No recent trauma  · Has limitation of movement  · Could be from OA  · Will check xray of the left hip    Acute metabolic encephalopathy: Secondary to above     Chronically elevated troponin     Acute kidney injury on chronic kidney disease  · Creatinine 3.3. · Nephrology following.     Anion Gap Metabolic acidosis - Probably secondary to worsening renal function  · Awaiting BMP     Elevated proBNP - Patient does not have a history of CHF, does not look fluid overloaded.     Hypertension - Patient on amlodipine at home     Uncontrolled diabetes mellitus type 2, on long-term insulin  · Patient is on glimepiride, Levemir 40 units a.m., 10 units nightly  · Continue Lantus 20 units every morning, 10 units nightly, insulin sliding scale with hypoglycemia protocol. Diabetic neuropathy -continue gabapentin     Chronic anemia     Gout-on allopurinol    History of prostate cancer  Has suprapubic catheter     History of CVA-right MCA infarct with left-sided weakness-2021. CT head on admission with acute process.    MRI head was nonacute-CT cerebral perfusion-hypoperfusion in the right MCA territory. Continue aspirin, statin.     History of COVID-19-1/2022. Diet ADULT DIET; Dysphagia - Soft and Bite Sized; 5 carb choices (75 gm/meal); Mildly Thick (Nectar)   DVT Prophylaxis [] Lovenox, [x]  Heparin, [] SCDs, [] Warfarin  [] NOAC     GI Prophylaxis [] PPI,  [] H2 Blocker,  [] Carafate,  [x] Diet/Tube Feeds   Code Status Full Code   MDM [] Low, [] Moderate,[x]  High     History of Present Illness:     Chief Complaint: Sepsis secondary to UTI [facility])    Seen and examined today. Complaining of severe left flank pain. He was not able to sleep well. No fever. No abdominal pain. No nausea or vomiting. No headache. Ten point ROS reviewed negative, unless as noted above    Objective: Intake/Output Summary (Last 24 hours) at 3/20/2022 1148  Last data filed at 3/19/2022 1612  Gross per 24 hour   Intake --   Output 800 ml   Net -800 ml      Vitals:   Vitals:    03/20/22 0740   BP: (!) 149/101   Pulse: 85   Resp: 17   Temp: 98.1 °F (36.7 °C)   SpO2: 99%     Physical Exam:   GEN Awake male, sitting upright in bed in no apparent distress. Appears given age. EYES Pupils are equally round. No scleral erythema, discharge, or conjunctivitis. HENT Mucous membranes are moist. Oral pharynx without exudates, no evidence of thrush. NECK Supple, no apparent thyromegaly or masses. RESP Clear to auscultation, no wheezes, rales or rhonchi. Symmetric chest movement while on room air. CARDIO/VASC S1/S2 auscultated. Regular rate without appreciable murmurs, rubs, or gallops. No JVD or carotid bruits. Peripheral pulses equal bilaterally and palpable. No peripheral edema. GI Abdomen is soft without significant tenderness, masses, or guarding. Bowel sounds are normoactive. Rectal exam deferred. CVA tenderness, left  MSK No gross joint deformities. SKIN Normal coloration, warm, dry. NEURO Cranial nerves appear grossly intact, normal speech, no lateralizing weakness. PSYCH Awake, alert, oriented x 4.   Affect appropriate.     Medications:   Medications:    cyclobenzaprine  5 mg Oral Once    polyethylene glycol  17 g Oral Daily    meropenem  500 mg IntraVENous Q12H    sodium chloride flush  5-40 mL IntraVENous 2 times per day    heparin (porcine)  5,000 Units SubCUTAneous 3 times per day    amLODIPine  10 mg Oral Daily    allopurinol  100 mg Oral Daily    atorvastatin  80 mg Oral Nightly    [Held by provider] pregabalin  50 mg Oral BID    insulin glargine  10 Units SubCUTAneous Nightly    insulin glargine  20 Units SubCUTAneous QAM    insulin lispro  0-12 Units SubCUTAneous TID WC    insulin lispro  0-6 Units SubCUTAneous Nightly      Infusions:    sodium chloride      dextrose       PRN Meds: HYDROmorphone, 0.5 mg, Q4H PRN  sodium chloride flush, 5-40 mL, PRN  sodium chloride, 25 mL, PRN  ondansetron, 4 mg, Q8H PRN   Or  ondansetron, 4 mg, Q6H PRN  polyethylene glycol, 17 g, Daily PRN  acetaminophen, 650 mg, Q6H PRN   Or  acetaminophen, 650 mg, Q6H PRN  glucose, 15 g, PRN  dextrose, 12.5 g, PRN  glucagon (rDNA), 1 mg, PRN  dextrose, 100 mL/hr, PRN        Recent Labs     03/18/22  1300 03/19/22  0424   WBC 10.8* 9.1   HGB 7.9* 8.0*   HCT 26.0* 26.4*   * 134*      Recent Labs     03/19/22 0424      K 4.3      CO2 24   PHOS 3.3   BUN 60*   CREATININE 3.3*     Recent Labs     03/19/22 0424   AST 34   ALT 19   BILITOT 0.6   ALKPHOS 110     Imaging reviewed    Electronically signed by Ivone Carey MD on 3/20/2022 at 11:48 AM

## 2022-03-21 LAB
ANION GAP SERPL CALCULATED.3IONS-SCNC: 11 MMOL/L (ref 4–16)
BUN BLDV-MCNC: 46 MG/DL (ref 6–23)
CALCIUM SERPL-MCNC: 8.4 MG/DL (ref 8.3–10.6)
CHLORIDE BLD-SCNC: 106 MMOL/L (ref 99–110)
CO2: 26 MMOL/L (ref 21–32)
CREAT SERPL-MCNC: 2.2 MG/DL (ref 0.9–1.3)
CULTURE: ABNORMAL
GFR AFRICAN AMERICAN: 35 ML/MIN/1.73M2
GFR NON-AFRICAN AMERICAN: 29 ML/MIN/1.73M2
GLUCOSE BLD-MCNC: 117 MG/DL (ref 70–99)
GLUCOSE BLD-MCNC: 138 MG/DL (ref 70–99)
GLUCOSE BLD-MCNC: 205 MG/DL (ref 70–99)
GLUCOSE BLD-MCNC: 213 MG/DL (ref 70–99)
GLUCOSE BLD-MCNC: 35 MG/DL (ref 70–99)
Lab: ABNORMAL
POTASSIUM SERPL-SCNC: 4.2 MMOL/L (ref 3.5–5.1)
SODIUM BLD-SCNC: 143 MMOL/L (ref 135–145)
SPECIMEN: ABNORMAL

## 2022-03-21 PROCEDURE — 6370000000 HC RX 637 (ALT 250 FOR IP): Performed by: INTERNAL MEDICINE

## 2022-03-21 PROCEDURE — 36415 COLL VENOUS BLD VENIPUNCTURE: CPT

## 2022-03-21 PROCEDURE — 6360000002 HC RX W HCPCS: Performed by: INTERNAL MEDICINE

## 2022-03-21 PROCEDURE — 92610 EVALUATE SWALLOWING FUNCTION: CPT

## 2022-03-21 PROCEDURE — 1200000000 HC SEMI PRIVATE

## 2022-03-21 PROCEDURE — 80048 BASIC METABOLIC PNL TOTAL CA: CPT

## 2022-03-21 PROCEDURE — 82962 GLUCOSE BLOOD TEST: CPT

## 2022-03-21 PROCEDURE — 2580000003 HC RX 258: Performed by: INTERNAL MEDICINE

## 2022-03-21 RX ORDER — NICOTINE POLACRILEX 4 MG
15 LOZENGE BUCCAL PRN
Status: DISCONTINUED | OUTPATIENT
Start: 2022-03-21 | End: 2022-03-30 | Stop reason: HOSPADM

## 2022-03-21 RX ORDER — DEXTROSE MONOHYDRATE 25 G/50ML
12.5 INJECTION, SOLUTION INTRAVENOUS PRN
Status: DISCONTINUED | OUTPATIENT
Start: 2022-03-21 | End: 2022-03-21 | Stop reason: ALTCHOICE

## 2022-03-21 RX ORDER — ATORVASTATIN CALCIUM 40 MG/1
40 TABLET, FILM COATED ORAL NIGHTLY
Status: DISCONTINUED | OUTPATIENT
Start: 2022-03-21 | End: 2022-03-30 | Stop reason: HOSPADM

## 2022-03-21 RX ORDER — INSULIN GLARGINE 100 [IU]/ML
10 INJECTION, SOLUTION SUBCUTANEOUS NIGHTLY
Status: DISCONTINUED | OUTPATIENT
Start: 2022-03-21 | End: 2022-03-26

## 2022-03-21 RX ORDER — DEXTROSE MONOHYDRATE 50 MG/ML
100 INJECTION, SOLUTION INTRAVENOUS PRN
Status: DISCONTINUED | OUTPATIENT
Start: 2022-03-21 | End: 2022-03-30 | Stop reason: HOSPADM

## 2022-03-21 RX ORDER — INSULIN GLARGINE 100 [IU]/ML
0.15 INJECTION, SOLUTION SUBCUTANEOUS NIGHTLY
Status: DISCONTINUED | OUTPATIENT
Start: 2022-03-21 | End: 2022-03-21

## 2022-03-21 RX ADMIN — ALLOPURINOL 100 MG: 100 TABLET ORAL at 10:30

## 2022-03-21 RX ADMIN — ATORVASTATIN CALCIUM 40 MG: 40 TABLET, FILM COATED ORAL at 21:03

## 2022-03-21 RX ADMIN — MEROPENEM 500 MG: 500 INJECTION, POWDER, FOR SOLUTION INTRAVENOUS at 19:13

## 2022-03-21 RX ADMIN — INSULIN GLARGINE 10 UNITS: 100 INJECTION, SOLUTION SUBCUTANEOUS at 21:06

## 2022-03-21 RX ADMIN — AMLODIPINE BESYLATE 10 MG: 10 TABLET ORAL at 10:30

## 2022-03-21 RX ADMIN — HEPARIN SODIUM 5000 UNITS: 5000 INJECTION INTRAVENOUS; SUBCUTANEOUS at 06:09

## 2022-03-21 RX ADMIN — HEPARIN SODIUM 5000 UNITS: 5000 INJECTION INTRAVENOUS; SUBCUTANEOUS at 21:03

## 2022-03-21 RX ADMIN — MEROPENEM 500 MG: 500 INJECTION, POWDER, FOR SOLUTION INTRAVENOUS at 03:38

## 2022-03-21 RX ADMIN — POLYETHYLENE GLYCOL (3350) 17 G: 17 POWDER, FOR SOLUTION ORAL at 10:29

## 2022-03-21 RX ADMIN — ACETAMINOPHEN 650 MG: 325 TABLET ORAL at 10:30

## 2022-03-21 RX ADMIN — HEPARIN SODIUM 5000 UNITS: 5000 INJECTION INTRAVENOUS; SUBCUTANEOUS at 15:08

## 2022-03-21 RX ADMIN — HYDROMORPHONE HYDROCHLORIDE 0.5 MG: 1 INJECTION, SOLUTION INTRAMUSCULAR; INTRAVENOUS; SUBCUTANEOUS at 10:34

## 2022-03-21 RX ADMIN — SODIUM CHLORIDE, PRESERVATIVE FREE 10 ML: 5 INJECTION INTRAVENOUS at 12:31

## 2022-03-21 RX ADMIN — SODIUM CHLORIDE 25 ML: 9 INJECTION, SOLUTION INTRAVENOUS at 03:38

## 2022-03-21 ASSESSMENT — PAIN SCALES - GENERAL
PAINLEVEL_OUTOF10: 2
PAINLEVEL_OUTOF10: 4
PAINLEVEL_OUTOF10: 7
PAINLEVEL_OUTOF10: 0
PAINLEVEL_OUTOF10: 7

## 2022-03-21 ASSESSMENT — PAIN DESCRIPTION - ORIENTATION: ORIENTATION: RIGHT;LEFT

## 2022-03-21 ASSESSMENT — PAIN DESCRIPTION - LOCATION: LOCATION: LEG

## 2022-03-21 NOTE — PROGRESS NOTES
[facility] [address]   Progress Note  [facility] 0 1 2      Date: 3/21/2022   Patient: Radha Garcia   : 1940   DOA: 3/16/2022   MRN: [id number]   ROOM#: [id number]     Admit Date: 3/16/2022     Collaborating Urologist on Call at time of admission: Dr. Yvette Coats  CC: AMS  Reason for Consult: Suprapubic catheter, UTI  POD #4 cystoscopy, left ureteral stent exchange, SPT exchange 16 French    Subjective:     Pain: mild, no nausea and no vomiting,   Bowel Movement/Flatus:   Yes  Voidinfr SPT in place, urine clear yellow    Pt resting in bed, states he is feeling better overall but having some leg pain/weakness. Objective:    Vitals:    BP (!) 145/82   Pulse 73   Temp 98 °F (36.7 °C) (Oral)   Resp 15   Ht 6' (1.829 m)   Wt 204 lb 8 oz (92.8 kg)   SpO2 95%   BMI 27.74 kg/m²    Temp  Av.1 °F (36.7 °C)  Min: 98 °F (36.7 °C)  Max: 98.1 °F (36.7 °C)       Intake/Output Summary (Last 24 hours) at 3/21/2022 7102  Last data filed at 3/21/2022 9259  Gross per 24 hour   Intake --   Output 2000 ml   Net -2000 ml     Physical Exam:  General appearance: alert, appears stated age, cooperative, fatigued, no distress, and mildly obese  Head: Normocephalic, without obvious abnormality, atraumatic  Back: Mild left CVA tenderness  Abdomen: Soft, non-distended, non-tender.  16fr SPT in place, urine clear yellow    Labs:   WBC:    Lab Results   Component Value Date    WBC 9.1 2022      Hemoglobin/Hematocrit:    Lab Results   Component Value Date    HGB 8.0 2022    HCT 26.4 2022      BMP:   Lab Results   Component Value Date     2022    K 4.3 2022    K 3.9 2018     2022    CO2 24 2022    BUN 60 2022    LABALBU 2.8 2022    CREATININE 3.3 2022    CALCIUM 8.4 2022    GFRAA 22 2022    LABGLOM 18 2022      PT/INR:    Lab Results   Component Value Date    PROTIME 15.5 2022    PROTIME 15.2 01/24/2011    INR 1.20 03/17/2022      PTT:    Lab Results   Component Value Date    APTT 33.1 03/17/2022     Blood Culture: +Klebsiella pneumoniae and +Streptococcus  Urine Culture: >50,000 CFU/ml Mixed pathogens Multiple organisms isolated, no predominance. Imaging:  CT ABDOMEN PELVIS WO CONTRAST Additional Contrast? None    Result Date: 3/17/2022  EXAMINATION: CT OF THE ABDOMEN AND PELVIS WITHOUT CONTRAST 3/17/2022 3:04 am TECHNIQUE: CT of the abdomen and pelvis was performed without the administration of intravenous contrast. Multiplanar reformatted images are provided for review. Dose modulation, iterative reconstruction, and/or weight based adjustment of the mA/kV was utilized to reduce the radiation dose to as low as reasonably achievable. COMPARISON: 09/12/2020 HISTORY: ORDERING SYSTEM PROVIDED HISTORY: UTI with altered mental status and history of renal stones TECHNOLOGIST PROVIDED HISTORY: Reason for exam:->UTI with altered mental status and history of renal stones Additional Contrast?->None Decision Support Exception - unselect if not a suspected or confirmed emergency medical condition->Emergency Medical Condition (MA) Reason for Exam: UTI with altered mental status and history of renal stones FINDINGS: Lower Chest: Calcified lymph nodes in the right hilar region. Atelectatic or fibrotic changes along the posterior lungs. Organs: Lack of IV contrast does reduce the evaluation of the organs and vasculature. There is streak artifact from the arms at the sides. No obvious masses seen within the liver. The gallbladder is distended and may have a tiny gallstone in the lumen. There is no fat stranding of the gallbladder fossa. The spleen is not enlarged but does have multiple calcified granulomas. No pancreatic calcification or ductal dilatation. There is stranding and edema adjacent to the pancreatic tail but appears more associated with the left kidney. The adrenal glands are not enlarged.  The right kidney has mild perinephric stranding no hydronephrosis or hydroureter. The left kidney does have asymmetric edema and increased perinephric stranding. Small amount of fluid are seen in the pararenal space. A left ureteral stent is present coursing down into the urinary bladder. No focal calcifications are seen along the course of the stent. However there is still the left-sided hydronephrosis and hydroureter. GI/Bowel: The stomach, small bowel, and colon are not dilated. There is a small hiatal hernia with mild thickening of the distal esophagus. Constipation and gaseous distension of the ascending and transverse colon. Appendix is identified and no focal appendicitis. There is no pneumoperitoneum. Pelvis: The urinary bladder is decompressed with a suprapubic catheter. Cannot accurately evaluate the urinary bladder wall. The distal coil of the left ureteral stent is in the inferior aspect versus is an expanded upper portion of the urethral junction. 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 Peritoneum/Retroperitoneum: No abdominal aortic aneurysm but does have moderate calcification. There is no retroperitoneal hematoma. Small left periaortic lymph nodes were present on the previous exam as well. Mild increase in size may be reactive to the left kidney. Bones/Soft Tissues: Degenerative changes in the disc spaces, facet joints, and sacroiliac joints. Large Schmorl node in the inferior endplate of S51 is increased in size. Smaller Schmorl nodes along the lumbar endplates appear stable. 1.  There is left-sided hydronephrosis and hydroureter despite the presence of the left ureteral stent. Increased left perinephric stranding and fluid compared to the right side. Will need to correlate for functionality of the stent versus ascending urinary tract infection. 2.  The urinary bladder is collapsed around the suprapubic catheter.   The distal coil of the ureteral stent courses into the urinary bladder along the inferior aspect or an expanded upper portion of the urethral junction. Previous prostate surgery is again noted. 3.  Constipation in the ascending and transverse colon. No small bowel obstruction or pneumoperitoneum. 4.  Mild thickening of the distal esophagus may relate to reflux esophagitis. CT HEAD WO CONTRAST    Result Date: 3/17/2022  EXAMINATION: CT OF THE HEAD WITHOUT CONTRAST  3/17/2022 12:19 am TECHNIQUE: CT of the head was performed without the administration of intravenous contrast. Dose modulation, iterative reconstruction, and/or weight based adjustment of the mA/kV was utilized to reduce the radiation dose to as low as reasonably achievable. COMPARISON: 11/16/2021 HISTORY: ORDERING SYSTEM PROVIDED HISTORY: AMS TECHNOLOGIST PROVIDED HISTORY: Reason for exam:->AMS Has a \"code stroke\" or \"stroke alert\" been called? ->No Decision Support Exception - unselect if not a suspected or confirmed emergency medical condition->Emergency Medical Condition (MA) Reason for Exam: AMS FINDINGS: BRAIN/VENTRICLES: There is no acute intracranial hemorrhage, mass effect or midline shift. No abnormal extra-axial fluid collection. The gray-white differentiation is maintained without evidence of an acute infarct. Hypoattenuation of the periventricular and subcortical white matter is suggestive of chronic small vessel ischemic disease. Mild diffuse parenchymal volume loss is noted. There is no evidence of hydrocephalus. ORBITS: The bilateral globes are intact. SINUSES: Mucoperiosteal thickening of the bilateral ethmoid air cells is noted. Other visualized paranasal sinuses and mastoid air cells are clear. SOFT TISSUES/SKULL:  No acute abnormality of the visualized skull or soft tissues. No acute intracranial abnormality. MRI may be obtained if clinically indicated. FL LESS THAN 1 HOUR    Result Date: 3/17/2022  Radiology exam is complete.  No Radiologist dictation. Please follow up with ordering provider. XR CHEST PORTABLE    Result Date: 3/17/2022  EXAMINATION: ONE XRAY VIEW OF THE CHEST 3/16/2022 11:48 pm COMPARISON: 01/06/2022 HISTORY: ORDERING SYSTEM PROVIDED HISTORY: fever TECHNOLOGIST PROVIDED HISTORY: Reason for exam:->fever Reason for Exam: FEVER FINDINGS: The cardiac silhouette is at the upper limits of normal in size. Vascular calcifications are noted along the aortic arch. Pulmonary edema is increasing. No focal lung infiltrate. No pleural effusion or pneumothorax. The bones are osteopenic. 1. Worsening pulmonary edema. Assessment & Plan:      Charli Jacinto is a 78y.o. year old male admitted 3/16/2022 for sepsis secondary to UTI.    1) Chronic Urinary Retention: managed with suprapubic catheter and exchanged monthly, last exchanged 3/17/22.              Recommend SPT exchanges q3 weeks. 2) Sepsis Secondary to Complicated UTI: Urine cx +>50K CFU/ml mixed organisms   Blood cultures +Klebsiella pneumoniae and +Streptococcus; repeat cultures NGTD              WBC 9.1, afebrile              On IV Merrem; recommend discharge on po Cefdinir BID x7 days. 3) Left Hydronephrosis: POD #4 cystoscopy, left ureteral stent exchange, SPT exchange 16 [place]   S/p cystoscopy, left ureteral stent placement 7/4/21   CT a/p 3/20/22: Well-positioned left ureteric stent with no evidence of hydronephrosis, renal abscess or other acute abnormality.  Normal right kidney. Well-positioned suprapubic catheter in the urinary bladder. CT a/p 3/17/22: There is left-sided hydronephrosis and hydroureter despite the presence of the left ureteral stent. Increased left perinephric stranding and fluid compared to the right side. The pt will need q3 month left ureteral stent exchanges  4) PAMELLA on CKD: Cr 2.2, improving (baseline of ~1.6)  5) H/o Prostate Cancer: S/p RPP with Dr. Mony James in [address] [place] [address] PSA 0.93 2/2021.  On Eligard q6 months     Pt stable from a  standpoint. Will sign off, please call with any questions. Pt to follow up with Dr. Morelia Rowell in 2 for reevaluation, will exchange SPT at that time. Patient seen and examined, chart reviewed.      Electronically signed by Narciso Presley PA-C on 3/21/2022 at 7:11 AM

## 2022-03-21 NOTE — PROGRESS NOTES
Nephrology Progress Note  3/21/2022 8:54 AM        Subjective:   Admit Date: 3/16/2022  PCP: Delaney Mead MD    Interval History: Patient seen in early morning, this is a late entry  Weekend events briefly reviewed    Diet: Poor oral intake    ROS: Fatigue, tired  No overt confusion or shortness of breath  Urine output recorded 2 L for the last 24 hours  No fever    Data:     Current meds:    atorvastatin  40 mg Oral Nightly    polyethylene glycol  17 g Oral Daily    meropenem  500 mg IntraVENous Q12H    sodium chloride flush  5-40 mL IntraVENous 2 times per day    heparin (porcine)  5,000 Units SubCUTAneous 3 times per day    amLODIPine  10 mg Oral Daily    allopurinol  100 mg Oral Daily    [Held by provider] pregabalin  50 mg Oral BID    insulin glargine  10 Units SubCUTAneous Nightly    insulin glargine  20 Units SubCUTAneous QAM    insulin lispro  0-12 Units SubCUTAneous TID WC    insulin lispro  0-6 Units SubCUTAneous Nightly      sodium chloride 25 mL (03/21/22 0338)    dextrose           I/O last 3 completed shifts:  In: -   Out: 2000 [Urine:2000]    CBC:   Recent Labs     03/18/22  1300 03/19/22  0424   WBC 10.8* 9.1   HGB 7.9* 8.0*   * 134*          Recent Labs     03/19/22  0424 03/21/22  0711    143   K 4.3 4.2    106   CO2 24 26   BUN 60* 46*   CREATININE 3.3* 2.2*   GLUCOSE 97 35*       Lab Results   Component Value Date    CALCIUM 8.4 03/21/2022    PHOS 3.3 03/19/2022       Objective:     Vitals: BP (!) 145/82   Pulse 73   Temp 98 °F (36.7 °C) (Oral)   Resp 15   Ht 6' (1.829 m)   Wt 204 lb 8 oz (92.8 kg)   SpO2 95%   BMI 27.74 kg/m²     General appearance: Alert, awake and oriented with head of the bed elevated about 45 degrees  HEENT: Positive conjunctival pallor  Neck: Supple  Lungs: Positive rhonchi  Heart: Seems irregular  Abdomen: Soft, tender on palpation  Extremities: Positive lower extremity edema  He has Hopper catheter      Problem List : Impression :     1. Acute kidney injury with underlying CKD stage IV A3-nonoliguric-recovering by creatinine criteria-mainly from obstructive uropathy and perhaps infection  2. Left-sided recurrent hydronephrosis-I will discuss with urology  3. Underlying diabetes with proteinuria  4. Genitourinary infection is suspected-blood culture was positive for Klebsiella pneumonia-likely urinary tract source  Multifactorial anemia and history of hypertension  Recommendation/Plan  :     1. Okay to discharge from my standpoint  2. Avoid hypoglycemia   3. I need to rearrange blood pressure medication as an outpatient  4. Discussion with urologist  5. Good blood pressure control and glycemic control  6. If you get discharged need a CMP, CBC differential, magnesium, phosphorus in 1 week  7. Follow-up with me in 2 weeks  8.  Also close urology follow-up      Ignacio Shone, MD MD

## 2022-03-21 NOTE — CONSULTS
Endocrinology   Consult Note        Dear Doctor Giovanni Richard    Thank You for the Consult     Pt. Was Admitted for : Sepsis from UTI neurolysed weakness    Reason for Consult: Better control of blood glucose    History Obtained From:  Patient/ EMR       HISTORY OF PRESENT ILLNESS:                The patient is a 78 y.o. male with significant past medical history of diabetes mellitus, cervical neck fusion surgery, hyperlipidemia, ataxia and unstable gait, has had bladder surgery prostatectomy comes in complaining of generalized weakness and UTI. Patient has suprapubic catheter I was  consulted for better of blood glucose of blood glucose. ROS:   Pt's ROS done in detail. Abnormal ROS are noted in Medical and Surgical History Section below: Other Medical History:        Diagnosis Date    Acute urinary tract infection 3/15/2012    Ataxia 2010    Diabetes mellitus ([address] 2011    type 2, controlled    Fusion of spine of cervical region 10/2012    Gait disturbance 2011    History of prostate cancer 1996    adenocarcinoma    History of tobacco use 1959    Hyperlipidemia 2011    Osteoarthritis 2011     Surgical History:        Procedure Laterality Date    BLADDER SURGERY      BLADDER SURGERY Left 3/17/2022    CYSTOSCOPY LEFT STENT EXCHANGE performed by Ming Tolentino MD at [place] [address]  3/28/13    Cysto with removal of artificial sphinter and placement of suprapubic catheter.  PROSTATE SURGERY      PROSTATECTOMY  1996    infection       Allergies:  Patient has no known allergies.     Family History:       Problem Relation Age of Onset   Wolfe Cancer Mother     Diabetes Father     Heart Disease Father     High Blood Pressure Father     Diabetes Sister     High Blood Pressure Sister     Cancer Brother         prostate, breast    Diabetes Brother     Cancer Maternal Uncle     Diabetes Maternal Grandmother     Stroke Maternal Grandfather      REVIEW OF SYSTEMS: Review of System Done as noted above     PHYSICAL EXAM:      Vitals:    BP (!) 158/82   Pulse 74   Temp 97.5 °F (36.4 °C) (Axillary)   Resp 12   Ht 6' (1.829 m)   Wt 204 lb 8 oz (92.8 kg)   SpO2 98%   BMI 27.74 kg/m²     CONSTITUTIONAL:  awake, alert, cooperative, appears stated age   EYES:  vision intact Fundoscopic Exam not performed   ENT:Normal  NECK:  Supple, No JVD. Thyroid Exam:Normal   LUNGS:  Has Vesicular Breath Sounds,   CARDIOVASCULAR:  Normal apical impulse, regular rate and rhythm, normal S1 and S2, no S3 or S4, and has no  murmur   ABDOMEN:  No scars, normal bowel sounds, soft, non-distended, non-tender, no masses palpated, no hepatolienomegaly has suprapubic catheter  Musculoskeletal: Normal  Extremities: Normal, peripheral pulses normal, , has no edema   NEUROLOGIC:  Awake, alert, oriented to name, place and time. Cranial nerves II-XII are grossly intact. Motor is  intact. Sensory neuropathy. ,  and gait isab normal.  End-stage    DATA:    CBC:   Recent Labs     03/19/22  0424   WBC 9.1   HGB 8.0*   *    CMP:  Recent Labs     03/19/22  0424 03/21/22  0711    143   K 4.3 4.2    106   CO2 24 26   BUN 60* 46*   CREATININE 3.3* 2.2*   CALCIUM 8.4 8.4   PROT 5.4*  --    LABALBU 2.8*  --    BILITOT 0.6  --    ALKPHOS 110  --    AST 34  --    ALT 19  --      Lipids: No results found for: CHOL, HDL, TRIG  Glucose:   Recent Labs     03/21/22  0855 03/21/22  1114 03/21/22  1643   POCGLU 117* 138* 205*     Hemoglobin A1C:   Lab Results   Component Value Date    LABA1C 9.3 11/19/2021     Free T4: No results found for: T4FREE  Free T3: No results found for: FT3  TSH High Sensitivity:   Lab Results   Component Value Date    TSHHS 3.720 02/11/2018       XR HIP 2-3 VW W PELVIS LEFT   Final Result   No acute abnormality or arthropathy of the hip. CT ABDOMEN PELVIS WO CONTRAST Additional Contrast? None   Final Result   1.  Well-positioned left ureteric stent with no evidence of hydronephrosis,   renal abscess or other acute abnormality. Normal right kidney. 2. Minimal atelectatic changes in both lower lobes. 3. Well-positioned suprapubic catheter in the urinary bladder. FL LESS THAN 1 HOUR   Final Result      CT ABDOMEN PELVIS WO CONTRAST Additional Contrast? None   Final Result   1. There is left-sided hydronephrosis and hydroureter despite the presence   of the left ureteral stent. Increased left perinephric stranding and fluid   compared to the right side. Will need to correlate for functionality of the   stent versus ascending urinary tract infection. 2.  The urinary bladder is collapsed around the suprapubic catheter. The   distal coil of the ureteral stent courses into the urinary bladder along the   inferior aspect or an expanded upper portion of the urethral junction. Previous prostate surgery is again noted. 3.  Constipation in the ascending and transverse colon. No small bowel   obstruction or pneumoperitoneum. 4.  Mild thickening of the distal esophagus may relate to reflux esophagitis. CT HEAD WO CONTRAST   Final Result   No acute intracranial abnormality. MRI may be obtained if clinically   indicated. XR CHEST PORTABLE   Final Result   1. Worsening pulmonary edema.                 Scheduled Medicines   Medications:    atorvastatin  40 mg Oral Nightly    insulin lispro  0-6 Units SubCUTAneous TID WC    insulin glargine  10 Units SubCUTAneous Nightly    insulin lispro  10 Units SubCUTAneous TID WC    insulin lispro  0-6 Units SubCUTAneous Nightly    polyethylene glycol  17 g Oral Daily    meropenem  500 mg IntraVENous Q12H    sodium chloride flush  5-40 mL IntraVENous 2 times per day    heparin (porcine)  5,000 Units SubCUTAneous 3 times per day    amLODIPine  10 mg Oral Daily    allopurinol  100 mg Oral Daily    [Held by provider] pregabalin  50 mg Oral BID      Infusions:    dextrose      sodium chloride 25 mL (03/21/22 6265)         IMPRESSION    Patient Active Problem List   Diagnosis    Gait disturbance    Generalized weakness    Diabetes mellitus [facility]    Osteoarthritis    Anemia of chronic disorder    Infected implanted bladder sphincter cuff    Escherichia coli urinary tract infection    Hypertension    Sepsis ([facility]    Type 2 diabetes mellitus with hypoglycemia without coma ([facility]    UTI (urinary tract infection) due to urinary indwelling catheter (HCC)    Hyperkalemia    Acute kidney failure (HCC)    Leg weakness, bilateral    Type 2 diabetes mellitus with neurological manifestations, uncontrolled (HCC)    Complicated UTI (urinary tract infection)    Essential hypertension    Severe muscle deconditioning    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 (HCC)    Frequent falls    Chronic kidney disease, stage 3a ([facility]    Uncontrolled type 2 diabetes mellitus with peripheral neuropathy (HCC)    Prostate cancer ([facility]    Suprapubic catheter ([facility]    Sepsis secondary to UTI ([facility]         RECOMMENDATIONS:      1. Reviewed POC blood glucose . Labs and X ray results   2. Reviewed Home and Current Medicines   3. Will Start On meal/ Correction bolus Humalog/ Lantus Insulin regime    4. Monitor Blood glucose frequently   5. Modify  the dose of Insulin  as needed        Will follow with you  Again thank you for sharing pt's care with me.      Truly yours,       Rory Garcia MD

## 2022-03-21 NOTE — CARE COORDINATION
Met with pt and son for a f/u visit for d/c planning d/t nurse informed CM this am that pt is wanting to go to a SNF. Pt states that he is not sure yet what he wants to do. He is from home with son and a referral was made to [facility]. PT/OT have been ordered. Pt wants to see what PT/OT recommend. CM to f/u with pt for d/c plan after the PT/OT recommendations are in. SNF list provided.   TE

## 2022-03-21 NOTE — PROGRESS NOTES
Hospitalist Progress Note      PCP: Juan Alberto Zarco MD    Date of Admission: 3/16/2022    Chief Complaint on Admission: flank pain    Pt Seen/Examined and Chart Reviewed. Admitting dx sepsis    SUBJECTIVE:     Left flank pain resolved, patient very weak, bs 37 overnight, hypoglycemia      OBJECTIVE:   Vitals    TEMPERATURE:  Current - Temp: 97.7 °F (36.5 °C); Max - Temp  Av.9 °F (36.6 °C)  Min: 97.7 °F (36.5 °C)  Max: 98.1 °F (36.7 °C)  RESPIRATIONS RANGE: Resp  Avg: 15.3  Min: 11  Max: 18  PULSE RANGE: Pulse  Av.8  Min: 73  Max: 84  BLOOD PRESSURE RANGE:  Systolic (34TQC), ZDA:315 , Min:138 , WJV:507   ; Diastolic (07RRN), WBA:17, Min:76, Max:94    PULSE OXIMETRY RANGE: SpO2  Av.4 %  Min: 95 %  Max: 98 %  24HR INTAKE/OUTPUT:    Intake/Output Summary (Last 24 hours) at 3/21/2022 1442  Last data filed at 3/21/2022 1096  Gross per 24 hour   Intake 240 ml   Output 2000 ml   Net -1760 ml       Exam:    General appearance: Well, no apparent distress, appears stated age and cooperative. HEENT Normal cephalic, atraumatic without obvious deformity. Pupils equal, round, and reactive to light. Extra ocular muscles intact. Conjunctivae/corneas clear. Neck: Supple, No jugular venous distention/bruits. Trachea midline without thyromegaly or adenopathy with full range of motion. Lungs: Clear to ascultation, bilaterally without Rales/Wheezes/Rhonchi with good respiratory effort. Heart: Regular rate and rhythm with Normal S1/S2 without  murmurs, rubs or gallops, point of maximum impulse non-displaced  Abdomen: Soft, non-tender or non-distended without rigidity or guarding and positive bowel sounds all four quadrants. Suprapubic catheter in place  Extremities: No clubbing, cyanosis, or edema bilaterally. Full range of motion without deformity  Skin: Skin color, texture, turgor normal. No rashes or lesions.   Neurologic: Alert and oriented X 3,  neurovascularly intact with sensory/motor intact upper extremities/lower extremities, bilaterally. Cranial nerves:II-XII intact, grossly non-focal.  Mental status: Alert, oriented, thought content appropriate. Data    Recent Labs     03/19/22 0424   WBC 9.1   HGB 8.0*   HCT 26.4*   *      Recent Labs     03/19/22 0424 03/21/22  0711    143   K 4.3 4.2    106   CO2 24 26   PHOS 3.3  --    BUN 60* 46*   CREATININE 3.3* 2.2*     Recent Labs     03/19/22 0424   AST 34   ALT 19   BILITOT 0.6   ALKPHOS 110     No results for input(s): INR in the last 72 hours. No results for input(s): CKTOTAL, CKMB, CKMBINDEX, TROPONINI in the last 72 hours. Imaging/Test Results    Cr 3.3  Hb 8.0  Blood cultures 3/17 kleb pnuemo pending  3/18 neg          Consults:     IP CONSULT TO HOSPITALIST  IP CONSULT TO UROLOGY  IP CONSULT TO ENDOCRINOLOGY  IP CONSULT TO DIETITIAN    Allergies  Patient has no known allergies.     Medications      Scheduled Meds:   atorvastatin  40 mg Oral Nightly    insulin glargine  0.15 Units/kg SubCUTAneous Nightly    insulin lispro  0.05 Units/kg SubCUTAneous TID WC    insulin lispro  0-6 Units SubCUTAneous TID WC    insulin lispro  0-3 Units SubCUTAneous Nightly    polyethylene glycol  17 g Oral Daily    meropenem  500 mg IntraVENous Q12H    sodium chloride flush  5-40 mL IntraVENous 2 times per day    heparin (porcine)  5,000 Units SubCUTAneous 3 times per day    amLODIPine  10 mg Oral Daily    allopurinol  100 mg Oral Daily    [Held by provider] pregabalin  50 mg Oral BID       Infusions:   dextrose      sodium chloride 25 mL (03/21/22 0338)       PRN Meds:  glucose, glucagon (rDNA), dextrose, dextrose bolus (hypoglycemia) **OR** dextrose bolus (hypoglycemia), HYDROmorphone, sodium chloride flush, sodium chloride, ondansetron **OR** ondansetron, polyethylene glycol, acetaminophen **OR** acetaminophen    ASSESSMENT AND PLAN      Active Hospital Problems    Diagnosis Date Noted    Sepsis secondary to UTI ([facility] [A41.9, N39.0] 03/17/2022         Adali Garcia is a 78 y.o.  male  who presents with Sepsis secondary to UTI [facility])     Sepsis Secondary to UTI   · Afebrile, WBC wnl  · Patient has suprapubic catheter. S/P Exchange  · Urine culture with mixed jean  · Blood culture with K pneumoniae, sensitivity pending  · Repeat blood culture no growth  · Continue meropenem  · Urology following     Left hydronephrosis:   · Status post cystoscopy, left ureteral stent exchange. Suprapubic catheter exchange. · Has persistent severe left flank pain despite pain medications  · Repeat CT abdomen neg, left ureter stent in place, no hydro     Left Hip Pain  · No recent trauma  · Has limitation of movement  · Could be from OA  · Xray left hip neg     Acute metabolic encephalopathy: Secondary to above, resolved     Chronically elevated troponin     Acute kidney injury on chronic kidney disease  · Creatinine 3.3. · Nephrology following.     Anion Gap Metabolic acidosis - Probably secondary to worsening renal function  · Awaiting BMP          Hypertension - Patient on amlodipine at home     Uncontrolled diabetes mellitus type 2, on long-term insulin  · Patient is on glimepiride, Levemir 20 units a.m., 10 units nightly  · Continue Lantus 20 units every morning, 10 units nightly, insulin sliding scale with hypoglycemia protocol. 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 CT head on admission with acute process. MRI head was nonacute-CT cerebral perfusion-hypoperfusion in the right MCA territory. Continue aspirin, statin.     History of COVID-19-1/2022.           PT/OT Eval Status: ordered    DVT Prophylaxis: SQ heparin  Diet: ADULT ORAL NUTRITION SUPPLEMENT; Lunch, Dinner; Frozen Oral Supplement  ADULT ORAL NUTRITION SUPPLEMENT; Breakfast; Fortified Pudding Oral Supplement  ADULT DIET;  Regular; 5 carb choices (75 gm/meal)  Code Status: Full Code      Dispo - awaiting kleb pneumo sensitivities from blood culture 3/17, on IV meropenem, complicated UTI with suprapubic catheter and left ureter stent, will need 14 days of treatment, with bacteremia, endo consulted for hypoglycemia overnight without symptoms, PT/OT discharge recs pending, lives with son, Tyrell Hodge vs. SNF at discharge, likely d/c tomorrow if sensitivities back with follow up with Dr. Lorri Nava, urology, PCP, endo, check chem in 1 week, a1c pending, iron studies ordered    Luciana Falcon MD

## 2022-03-21 NOTE — PROGRESS NOTES
Speech Language Pathology  Facility/Department: [facility]   CLINICAL BEDSIDE SWALLOW EVALUATION    NAME: Ankit Roy  : 1940  MRN: [id number]    IMPRESSIONS: Ankit Roy was referred for a bedside swallow evaluation following admission to [facility] with sepsis 2/2 UTI, acute metabolic encephalopathy. Medical hx includes HTN, DM, neuropathy, anemia, CKD, R MCA CVA. No known history of dysphagia prior to admission. Pt seen for evaluation seated upright in bed, alert, cooperative. Benefited from increased response time. Oral mechanism examination WFL without asymmetry. Noted pt is edentulous and he reports wearing only upper dentures. He denies any difficulty chewing/swallowing. Pt presented with PO trials of thin liquids via cup/straw, puree, and regular solids. Oral stage WFL with intact labial seal, slow/adequate mastication, intact AP transit/clearance. Pharyngeal stage appears [facility] with adequate swallow initiation/laryngeal elevation and no s/s aspiration across all trials. Recommend advancement to regular diet/thin liquids. No further acute SLP needs identified. ADMISSION DATE: 3/16/2022  ADMITTING DIAGNOSIS: has Gait disturbance; Generalized weakness; Diabetes mellitus ([address] Osteoarthritis; Anemia of chronic disorder; Infected implanted bladder sphincter cuff; Escherichia coli urinary tract infection; Hypertension; Sepsis ([address] Type 2 diabetes mellitus with hypoglycemia without coma ([address] UTI (urinary tract infection) due to urinary indwelling catheter ([address] Hyperkalemia; Acute kidney failure ([address] Leg weakness, bilateral; Type 2 diabetes mellitus with neurological manifestations, uncontrolled ([address] Complicated UTI (urinary tract infection); Essential hypertension; Severe muscle deconditioning; 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 ([address] Frequent falls; Chronic kidney disease, stage 3a ([address] Uncontrolled type 2 diabetes mellitus with peripheral neuropathy ([address]  Prostate cancer ([facility] Suprapubic catheter ([facility] and Sepsis secondary to UTI [facility]) on their problem list.  ONSET DATE: this admission    Recent Chest Xray/CT of Chest: see chart    Date of Eval: 3/21/2022  Evaluating Therapist: RUDDY Valenzuela    Current Diet level:  Current Diet : Dysphagia Soft and Bite-Sized (Dysphagia III)  Current Liquid Diet : Mildly Thick (Nectar)      Primary Complaint  Patient Complaint: reduced appetite    Pain:  Pain Assessment  Pain Assessment: 0-10  Pain Level: 7  Patient's Stated Pain Goal: No pain  Pain Type: Acute pain  Pain Location: Leg  Pain Orientation: Right,Left  Pain Frequency: Intermittent  Pain Onset: On-going  Non-Pharmaceutical Pain Intervention(s): Repositioned,Rest    Reason for Referral  Jean Jenkins was referred for a bedside swallow evaluation to assess the efficiency of his swallow function, identify signs and symptoms of aspiration and make recommendations regarding safe dietary consistencies, effective compensatory strategies, and safe eating environment. Impression  Dysphagia Diagnosis: Swallow function appears grossly intact  Dysphagia Outcome Severity Scale: Level 6: Within functional limits/Modified independence     Treatment Plan  Requires SLP Intervention: No  Duration/Frequency of Treatment: N/A  D/C Recommendations: To be determined       Recommended Diet and Intervention  Diet Solids Recommendation: Regular  Liquid Consistency Recommendation: Thin  Recommended Form of Meds: PO          Compensatory Swallowing Strategies  Compensatory Swallowing Strategies: Upright as possible for all oral intake    Treatment/Goals  Short-term Goals  Timeframe for Short-term Goals: N/A    General  Chart Reviewed: Yes  Behavior/Cognition: Cooperative; Alert  Communication Observation: Functional (baseline cognitive communication deficits?)  Follows Directions: Simple  Dentition: Dentures top;Edentulous  Patient Positioning: Upright in bed  Baseline Vocal Quality: Normal  Prior Dysphagia History: none known prior to admission  Consistencies Administered: Reg solid; Dysphagia Pureed (Dysphagia I); Thin - cup; Thin - straw           Vision/Hearing  Hearing  Hearing: Within functional limits    Oral Motor Deficits  Oral/Motor  Oral Motor: Within functional limits    Oral Phase Dysfunction  Oral Phase  Oral Phase: WFL     Indicators of Pharyngeal Phase Dysfunction   Pharyngeal Phase  Pharyngeal Phase: WFL    Prognosis  Prognosis  Barriers/Prognosis Comment: N/A  Individuals consulted  Consulted and agree with results and recommendations: Patient    Education  Patient Education: results, recommendations  Patient Education Response: Verbalizes understanding  Safety Devices in place: Yes  Type of devices:  All fall risk precautions in place       Therapy Time  SLP Individual Minutes  Time In: [address]  Time Out: [address] [place] [address]  Minutes: Fredy, [facility] [address]  3/21/2022 2:24 PM

## 2022-03-22 LAB
ANION GAP SERPL CALCULATED.3IONS-SCNC: 12 MMOL/L (ref 4–16)
BASOPHILS ABSOLUTE: 0 K/CU MM
BASOPHILS RELATIVE PERCENT: 0.3 % (ref 0–1)
BUN BLDV-MCNC: 37 MG/DL (ref 6–23)
CALCIUM SERPL-MCNC: 8.5 MG/DL (ref 8.3–10.6)
CHLORIDE BLD-SCNC: 105 MMOL/L (ref 99–110)
CO2: 23 MMOL/L (ref 21–32)
CREAT SERPL-MCNC: 2 MG/DL (ref 0.9–1.3)
DIFFERENTIAL TYPE: ABNORMAL
EOSINOPHILS ABSOLUTE: 0.4 K/CU MM
EOSINOPHILS RELATIVE PERCENT: 4.6 % (ref 0–3)
ESTIMATED AVERAGE GLUCOSE: 217 MG/DL
FERRITIN: 352 NG/ML (ref 30–400)
FOLATE: 16.5 NG/ML (ref 3.1–17.5)
GFR AFRICAN AMERICAN: 39 ML/MIN/1.73M2
GFR NON-AFRICAN AMERICAN: 32 ML/MIN/1.73M2
GLUCOSE BLD-MCNC: 105 MG/DL (ref 70–99)
GLUCOSE BLD-MCNC: 126 MG/DL (ref 70–99)
GLUCOSE BLD-MCNC: 136 MG/DL (ref 70–99)
GLUCOSE BLD-MCNC: 136 MG/DL (ref 70–99)
GLUCOSE BLD-MCNC: 149 MG/DL (ref 70–99)
GLUCOSE BLD-MCNC: 163 MG/DL (ref 70–99)
HBA1C MFR BLD: 9.2 % (ref 4.2–6.3)
HCT VFR BLD CALC: 26.2 % (ref 42–52)
HEMOGLOBIN: 8.2 GM/DL (ref 13.5–18)
IMMATURE NEUTROPHIL %: 0.5 % (ref 0–0.43)
IRON: 15 UG/DL (ref 59–158)
LYMPHOCYTES ABSOLUTE: 1.5 K/CU MM
LYMPHOCYTES RELATIVE PERCENT: 16.7 % (ref 24–44)
Lab: 24 HRS
MCH RBC QN AUTO: 29.5 PG (ref 27–31)
MCHC RBC AUTO-ENTMCNC: 31.3 % (ref 32–36)
MCV RBC AUTO: 94.2 FL (ref 78–100)
MONOCYTES ABSOLUTE: 1.2 K/CU MM
MONOCYTES RELATIVE PERCENT: 13.4 % (ref 0–4)
NUCLEATED RBC %: 0 %
PCT TRANSFERRIN: 7 % (ref 10–44)
PDW BLD-RTO: 15.4 % (ref 11.7–14.9)
PLATELET # BLD: 210 K/CU MM (ref 140–440)
PMV BLD AUTO: 12.1 FL (ref 7.5–11.1)
POTASSIUM SERPL-SCNC: 4.4 MMOL/L (ref 3.5–5.1)
PROTEIN 24 HOUR URINE: 933 MG/24 HR
RBC # BLD: 2.78 M/CU MM (ref 4.6–6.2)
SEGMENTED NEUTROPHILS ABSOLUTE COUNT: 5.6 K/CU MM
SEGMENTED NEUTROPHILS RELATIVE PERCENT: 64.5 % (ref 36–66)
SODIUM BLD-SCNC: 140 MMOL/L (ref 135–145)
TOTAL IMMATURE NEUTOROPHIL: 0.04 K/CU MM
TOTAL IRON BINDING CAPACITY: 214 UG/DL (ref 250–450)
TOTAL NUCLEATED RBC: 0 K/CU MM
UNSATURATED IRON BINDING CAPACITY: 199 UG/DL (ref 110–370)
VITAMIN B-12: 603.7 PG/ML (ref 211–911)
VOLUME, (UVOL): 1300 MLS
WBC # BLD: 8.7 K/CU MM (ref 4–10.5)

## 2022-03-22 PROCEDURE — 80048 BASIC METABOLIC PNL TOTAL CA: CPT

## 2022-03-22 PROCEDURE — 83036 HEMOGLOBIN GLYCOSYLATED A1C: CPT

## 2022-03-22 PROCEDURE — 82962 GLUCOSE BLOOD TEST: CPT

## 2022-03-22 PROCEDURE — 97112 NEUROMUSCULAR REEDUCATION: CPT

## 2022-03-22 PROCEDURE — 2580000003 HC RX 258: Performed by: INTERNAL MEDICINE

## 2022-03-22 PROCEDURE — 82728 ASSAY OF FERRITIN: CPT

## 2022-03-22 PROCEDURE — 36415 COLL VENOUS BLD VENIPUNCTURE: CPT

## 2022-03-22 PROCEDURE — 6360000002 HC RX W HCPCS: Performed by: INTERNAL MEDICINE

## 2022-03-22 PROCEDURE — 6370000000 HC RX 637 (ALT 250 FOR IP): Performed by: INTERNAL MEDICINE

## 2022-03-22 PROCEDURE — 83540 ASSAY OF IRON: CPT

## 2022-03-22 PROCEDURE — 81050 URINALYSIS VOLUME MEASURE: CPT

## 2022-03-22 PROCEDURE — 97530 THERAPEUTIC ACTIVITIES: CPT

## 2022-03-22 PROCEDURE — 83550 IRON BINDING TEST: CPT

## 2022-03-22 PROCEDURE — 82746 ASSAY OF FOLIC ACID SERUM: CPT

## 2022-03-22 PROCEDURE — 1200000000 HC SEMI PRIVATE

## 2022-03-22 PROCEDURE — 84156 ASSAY OF PROTEIN URINE: CPT

## 2022-03-22 PROCEDURE — 97163 PT EVAL HIGH COMPLEX 45 MIN: CPT

## 2022-03-22 PROCEDURE — 97167 OT EVAL HIGH COMPLEX 60 MIN: CPT

## 2022-03-22 PROCEDURE — 85025 COMPLETE CBC W/AUTO DIFF WBC: CPT

## 2022-03-22 PROCEDURE — 76937 US GUIDE VASCULAR ACCESS: CPT

## 2022-03-22 PROCEDURE — 82607 VITAMIN B-12: CPT

## 2022-03-22 PROCEDURE — 6370000000 HC RX 637 (ALT 250 FOR IP): Performed by: HOSPITALIST

## 2022-03-22 RX ORDER — CEPHALEXIN 500 MG/1
500 CAPSULE ORAL EVERY 6 HOURS SCHEDULED
Status: DISCONTINUED | OUTPATIENT
Start: 2022-03-22 | End: 2022-03-30 | Stop reason: HOSPADM

## 2022-03-22 RX ORDER — OXYCODONE HYDROCHLORIDE 5 MG/1
5 TABLET ORAL EVERY 4 HOURS PRN
Status: DISCONTINUED | OUTPATIENT
Start: 2022-03-22 | End: 2022-03-30 | Stop reason: HOSPADM

## 2022-03-22 RX ADMIN — ALLOPURINOL 100 MG: 100 TABLET ORAL at 10:10

## 2022-03-22 RX ADMIN — MEROPENEM 500 MG: 500 INJECTION, POWDER, FOR SOLUTION INTRAVENOUS at 03:06

## 2022-03-22 RX ADMIN — HEPARIN SODIUM 5000 UNITS: 5000 INJECTION INTRAVENOUS; SUBCUTANEOUS at 22:55

## 2022-03-22 RX ADMIN — HEPARIN SODIUM 5000 UNITS: 5000 INJECTION INTRAVENOUS; SUBCUTANEOUS at 14:55

## 2022-03-22 RX ADMIN — SODIUM CHLORIDE, PRESERVATIVE FREE 10 ML: 5 INJECTION INTRAVENOUS at 23:00

## 2022-03-22 RX ADMIN — INSULIN GLARGINE 10 UNITS: 100 INJECTION, SOLUTION SUBCUTANEOUS at 22:55

## 2022-03-22 RX ADMIN — POLYETHYLENE GLYCOL (3350) 17 G: 17 POWDER, FOR SOLUTION ORAL at 10:10

## 2022-03-22 RX ADMIN — OXYCODONE HYDROCHLORIDE 5 MG: 5 TABLET ORAL at 10:31

## 2022-03-22 RX ADMIN — OXYCODONE HYDROCHLORIDE 5 MG: 5 TABLET ORAL at 14:40

## 2022-03-22 RX ADMIN — HYDROMORPHONE HYDROCHLORIDE 0.5 MG: 1 INJECTION, SOLUTION INTRAMUSCULAR; INTRAVENOUS; SUBCUTANEOUS at 05:39

## 2022-03-22 RX ADMIN — MEROPENEM 500 MG: 500 INJECTION, POWDER, FOR SOLUTION INTRAVENOUS at 14:57

## 2022-03-22 RX ADMIN — ATORVASTATIN CALCIUM 40 MG: 40 TABLET, FILM COATED ORAL at 22:54

## 2022-03-22 RX ADMIN — HEPARIN SODIUM 5000 UNITS: 5000 INJECTION INTRAVENOUS; SUBCUTANEOUS at 05:37

## 2022-03-22 RX ADMIN — AMLODIPINE BESYLATE 10 MG: 10 TABLET ORAL at 10:10

## 2022-03-22 RX ADMIN — SODIUM CHLORIDE, PRESERVATIVE FREE 10 ML: 5 INJECTION INTRAVENOUS at 10:10

## 2022-03-22 ASSESSMENT — PAIN SCALES - GENERAL
PAINLEVEL_OUTOF10: 10
PAINLEVEL_OUTOF10: 7
PAINLEVEL_OUTOF10: 7
PAINLEVEL_OUTOF10: 4
PAINLEVEL_OUTOF10: 10
PAINLEVEL_OUTOF10: 7
PAINLEVEL_OUTOF10: 0
PAINLEVEL_OUTOF10: 10
PAINLEVEL_OUTOF10: 7
PAINLEVEL_OUTOF10: 0

## 2022-03-22 ASSESSMENT — PAIN DESCRIPTION - LOCATION
LOCATION: LEG
LOCATION: LEG

## 2022-03-22 ASSESSMENT — PAIN DESCRIPTION - ORIENTATION
ORIENTATION: RIGHT
ORIENTATION: RIGHT;LEFT

## 2022-03-22 ASSESSMENT — PAIN DESCRIPTION - ONSET: ONSET: ON-GOING

## 2022-03-22 ASSESSMENT — PAIN DESCRIPTION - PROGRESSION: CLINICAL_PROGRESSION: GRADUALLY WORSENING

## 2022-03-22 ASSESSMENT — PAIN DESCRIPTION - PAIN TYPE
TYPE: CHRONIC PAIN;ACUTE PAIN
TYPE: ACUTE PAIN

## 2022-03-22 ASSESSMENT — PAIN DESCRIPTION - DESCRIPTORS: DESCRIPTORS: STABBING

## 2022-03-22 ASSESSMENT — PAIN - FUNCTIONAL ASSESSMENT: PAIN_FUNCTIONAL_ASSESSMENT: PREVENTS OR INTERFERES WITH MANY ACTIVE NOT PASSIVE ACTIVITIES

## 2022-03-22 ASSESSMENT — PAIN DESCRIPTION - FREQUENCY: FREQUENCY: CONTINUOUS

## 2022-03-22 NOTE — PROGRESS NOTES
4 Eyes Skin Assessment     NAME:  Radha Garcia  YOB: 1940  MEDICAL RECORD NUMBER:  [id number]    The patient is being assess for  Transfer to New Unit    I agree that 2 RN's have performed a thorough Head to Toe Skin Assessment on the patient. ALL assessment sites listed below have been assessed. Areas assessed by both nurses:    Head, Face, Ears, Shoulders, Back, Chest, Arms, Elbows, Hands, Sacrum. Buttock, Coccyx, Ischium and Legs. Feet and Heels        Does the Patient have a Wound? Yes wound(s) were present on assessment. LDA wound assessment was Initiated and completed        Judd Prevention initiated:  Yes   Wound Care Orders initiated:  Yes    Pressure Injury (Stage 3,4, Unstageable, DTI, NWPT, and Complex wounds) if present place consult order under [de-identified] No    New and Established Ostomies if present place consult order under : No      Nurse 1 eSignature: Electronically signed by Dillon Olmos RN on 3/22/22 at 7:36 PM EDT    **SHARE this note so that the co-signing nurse is able to place an eSignature**    Nurse 2 eSignature: Electronically signed by Malissa Barrow.  Israel Queen RN on 3/22/22 at 8:44 PM EDT

## 2022-03-22 NOTE — PROGRESS NOTES
Progress Note( Dr. Gage Trinidad)  3/22/2022  Subjective:   Admit Date: 3/16/2022  PCP: Estella Allred MD    Admitted For :Sepsis from UTI generalized  weakness    Consulted For: Better control of blood glucose    Interval History: feels better  Still feels weak     Denies any chest pains,   Denies SOB . Denies nausea or vomiting. No new bowel or bladder symptoms. Intake/Output Summary (Last 24 hours) at 3/22/2022 1905  Last data filed at 3/21/2022 1908  Gross per 24 hour   Intake --   Output 650 ml   Net -650 ml       DATA    CBC:   Recent Labs     03/22/22  0823   WBC 8.7   HGB 8.2*       CMP:  Recent Labs     03/21/22  0711 03/22/22  0823    140   K 4.2 4.4    105   CO2 26 23   BUN 46* 37*   CREATININE 2.2* 2.0*   CALCIUM 8.4 8.5     Lipids: No results found for: CHOL, HDL, TRIG  Glucose:  Recent Labs     03/22/22  0629 03/22/22  1142 03/22/22  1643   POCGLU 149* 105* 136*     [id number]:  Lab Results   Component Value Date    LABA1C 9.2 03/22/2022     High Sensitivity TSH:   Lab Results   Component Value Date    TSHHS 3.720 02/11/2018     Free T3: No results found for: FT3  Free T4:No results found for: T4FREE    XR HIP 2-3 VW W PELVIS LEFT   Final Result   No acute abnormality or arthropathy of the hip. CT ABDOMEN PELVIS WO CONTRAST Additional Contrast? None   Final Result   1. Well-positioned left ureteric stent with no evidence of hydronephrosis,   renal abscess or other acute abnormality. Normal right kidney. 2. Minimal atelectatic changes in both lower lobes. 3. Well-positioned suprapubic catheter in the urinary bladder. FL LESS THAN 1 HOUR   Final Result      CT ABDOMEN PELVIS WO CONTRAST Additional Contrast? None   Final Result   1. There is left-sided hydronephrosis and hydroureter despite the presence   of the left ureteral stent. Increased left perinephric stranding and fluid   compared to the right side.   Will need to correlate for functionality of the   stent versus ascending urinary tract infection. 2.  The urinary bladder is collapsed around the suprapubic catheter. The   distal coil of the ureteral stent courses into the urinary bladder along the   inferior aspect or an expanded upper portion of the urethral junction. Previous prostate surgery is again noted. 3.  Constipation in the ascending and transverse colon. No small bowel   obstruction or pneumoperitoneum. 4.  Mild thickening of the distal esophagus may relate to reflux esophagitis. CT HEAD WO CONTRAST   Final Result   No acute intracranial abnormality. MRI may be obtained if clinically   indicated. XR CHEST PORTABLE   Final Result   1. Worsening pulmonary edema.               Scheduled Medicines   Medications:    cephALEXin  500 mg Oral 4 times per day    atorvastatin  40 mg Oral Nightly    insulin lispro  0-6 Units SubCUTAneous TID WC    insulin glargine  10 Units SubCUTAneous Nightly    insulin lispro  10 Units SubCUTAneous TID WC    insulin lispro  0-6 Units SubCUTAneous Nightly    polyethylene glycol  17 g Oral Daily    sodium chloride flush  5-40 mL IntraVENous 2 times per day    heparin (porcine)  5,000 Units SubCUTAneous 3 times per day    amLODIPine  10 mg Oral Daily    allopurinol  100 mg Oral Daily    [Held by provider] pregabalin  50 mg Oral BID      Infusions:    dextrose      sodium chloride 25 mL (03/21/22 0338)         Objective:   Vitals: BP (!) 155/85   Pulse 82   Temp 100 °F (37.8 °C)   Resp 12   Ht 6' (1.829 m)   Wt 204 lb 8 oz (92.8 kg)   SpO2 100%   BMI 27.74 kg/m²   General appearance: alert and cooperative with exam  Neck: no JVD or bruit  Thyroid : Normal lobes   Lungs: Has Vesicular Breath sounds   Heart:  regular rate and rhythm  Abdomen: soft, non-tender; bowel sounds normal; no masses,  no organomegaly has Surapubic Catheter   Musculoskeletal: Normal  Extremities: extremities normal, , no edema  Neurologic:  Awake, alert, oriented to name, place and time. Cranial nerves II-XII are grossly intact. Motor weakness . Sensory is intact. ,  and gait is abnormal.unstable     Assessment:     Patient Active Problem List:     Gait disturbance     Generalized weakness     Diabetes mellitus (HCC)     Osteoarthritis     Anemia of chronic disorder     Infected implanted bladder sphincter cuff     Escherichia coli urinary tract infection     Hypertension     Sepsis ([address]     Type 2 diabetes mellitus with hypoglycemia without coma ([address]     UTI (urinary tract infection) due to urinary indwelling catheter (HCC)     Hyperkalemia     Acute kidney failure (HCC)     Leg weakness, bilateral     Type 2 diabetes mellitus with neurological manifestations, uncontrolled ([address]     Complicated UTI (urinary tract infection)     Essential hypertension     Severe muscle deconditioning     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 (HCC)     Frequent falls     Chronic kidney disease, stage 3a ([address]     Uncontrolled type 2 diabetes mellitus with peripheral neuropathy (HCC)     Prostate cancer ([address]     Suprapubic catheter ([address]     Sepsis secondary to UTI ([address]      Plan:     1. Reviewed POC blood glucose . Labs and X ray results   2. Reviewed Current Medicines   3. On meal/ Correction bolus Humalog/ Basal Lantus Insulin regime / and   4. Monitor Blood glucose frequently   5. Modified  the dose of Insulin/ other medicines as needed   6. Will follow     .      Chris Landry MD, MD

## 2022-03-22 NOTE — PLAN OF CARE
Nutrition Problem #1: Inadequate oral intake  Intervention: Food and/or Nutrient Delivery: Continue Current Diet,Modify Oral Nutrition Supplement  Nutritional Goals: Pt will consume at least 75% of his meals and supplements

## 2022-03-22 NOTE — CONSULTS
Comprehensive Nutrition Assessment    Type and Reason for Visit:  Initial,Consult (Poor Intake/Appetite 5 or more days)    Nutrition Recommendations/Plan:   · Continue current diet, avoid concentrated sweets  · Encourage protein containing between meal snacks and oral supplements  · Assist with feeding as needed, PO intakes appreciated    Nutrition Assessment:  Pt was admitted with sepsis 2/2 UTI, acute metabolic encephalopathy, hypertension, uncontrolled diabetes mellitus type 2, diabetic neuropathy, gout, CKD, prostate cancer, status post suprapubic catheter, history of ureteral stent,  CVA, COVID. PO intake has been sub optimal over stay, consuming less than half of meals with set up and assistance. Patient very weak, bs 37 overnight, hypoglycemia noted testerday. Pt states he's not drinking oral supplement, he's drinking Pepsi. Knew today's lunch was pepper steak and rice, he's looking forward to it. No recent wt loss per Epic history. Will continue to follow as high nutrition risk. Malnutrition Assessment:  Malnutrition Status: At risk for malnutrition   Context:  Acute Illness       Estimated Daily Nutrient Needs:  Energy (kcal):  2023 (25 kcal/kg IBW); Weight Used for Energy Requirements:  Ideal     Protein (g):  81-97 (1-1.2 g/kg IBW); Weight Used for Protein Requirements:  Ideal        Fluid (ml/day):  2000; Method Used for Fluid Requirements:  1 ml/kcal      Nutrition Related Findings:  BUN 37, Cr 2, Glu 126-149, A1C 9.3      Wounds:  None       Current Nutrition Therapies:    ADULT ORAL NUTRITION SUPPLEMENT; Lunch, Dinner; Frozen Oral Supplement  ADULT DIET; Regular; 5 carb choices (75 gm/meal)  ADULT ORAL NUTRITION SUPPLEMENT; Breakfast, Lunch, Dinner;  Low Calorie/High Protein Oral Supplement    Anthropometric Measures:  · Height: 6' (182.9 cm)  · Current Body Weight: 204 lb 8 oz (92.8 kg)   · Admission Body Weight: 201 lb 14.4 oz (91.6 kg)    · Usual Body Weight: 204 lb 13 oz (92.9 kg) (11/18/21)     · Ideal Body Weight: 178 lbs; % Ideal Body Weight 114.9 %   · BMI: 27.7  · BMI Categories: Overweight (BMI 25.0-29. 9)       Nutrition Diagnosis:   · Inadequate oral intake related to pain as evidenced by intake 0-25%,intake 26-50%    Nutrition Interventions:   Food and/or Nutrient Delivery:  Continue Current Diet,Modify Oral Nutrition Supplement  Nutrition Education/Counseling:  Education initiated   Coordination of Nutrition Care:  Continue to monitor while inpatient,Feeding Assistance/Environment Change    Goals:  Pt will consume at least 75% of his meals and supplements       Nutrition Monitoring and Evaluation:   Behavioral-Environmental Outcomes:  None Identified   Food/Nutrient Intake Outcomes:  Food and Nutrient Intake,Supplement Intake  Physical Signs/Symptoms Outcomes:  Biochemical Data,Chewing or Swallowing,GI Status,Meal Time Behavior,Weight     Discharge Planning:    Continue Oral Nutrition Supplement     Electronically signed by Judd Portillo RD, LD on 3/22/22 at 10:22 AM EDT    Contact: [phone]

## 2022-03-22 NOTE — PROGRESS NOTES
Hospitalist Progress Note      PCP: Lu Demarco MD    Date of Admission: 3/16/2022    Chief Complaint on Admission: flank pain    Pt Seen/Examined and Chart Reviewed. Admitting dx sepsis    SUBJECTIVE:     Patient seen and examined  Complain of diffuse pain      OBJECTIVE:   Vitals    TEMPERATURE:  Current - Temp: 97.8 °F (36.6 °C); Max - Temp  Av.8 °F (36.6 °C)  Min: 97.5 °F (36.4 °C)  Max: 97.9 °F (36.6 °C)  RESPIRATIONS RANGE: Resp  Avg: 15.5  Min: 12  Max: 18  PULSE RANGE: Pulse  Av.5  Min: 74  Max: 86  BLOOD PRESSURE RANGE:  Systolic (40UPJ), FNO:046 , Min:158 , GQX:866   ; Diastolic (85XGL), FIW:42, Min:82, Max:99    PULSE OXIMETRY RANGE: SpO2  Av.8 %  Min: 96 %  Max: 100 %  24HR INTAKE/OUTPUT:      Intake/Output Summary (Last 24 hours) at 3/22/2022 0945  Last data filed at 3/21/2022 1908  Gross per 24 hour   Intake --   Output 650 ml   Net -650 ml       Exam:    General appearance: Well, no apparent distress, appears stated age and cooperative. HEENT Normal cephalic, atraumatic without obvious deformity. Pupils equal, round, and reactive to light. Extra ocular muscles intact. Conjunctivae/corneas clear. Neck: Supple, No jugular venous distention/bruits. Trachea midline without thyromegaly or adenopathy with full range of motion. Lungs: Clear to ascultation, bilaterally without Rales/Wheezes/Rhonchi with good respiratory effort. Heart: Regular rate and rhythm with Normal S1/S2 without  murmurs, rubs or gallops, point of maximum impulse non-displaced  Abdomen: Soft, non-tender or non-distended without rigidity or guarding and positive bowel sounds all four quadrants. Suprapubic catheter in place  Extremities: No clubbing, cyanosis, or edema bilaterally. Full range of motion without deformity  Skin: Skin color, texture, turgor normal. No rashes or lesions.   Neurologic: Alert and oriented X 3,  neurovascularly intact with sensory/motor intact upper extremities/lower extremities, bilaterally. Cranial nerves:II-XII intact, grossly non-focal.  Mental status: Alert, oriented, thought content appropriate. Data    No results for input(s): WBC, HGB, HCT, PLT in the last 72 hours. Recent Labs     03/21/22  0711 03/22/22  0823    140   K 4.2 4.4    105   CO2 26 23   BUN 46* 37*   CREATININE 2.2* 2.0*     No results for input(s): AST, ALT, ALB, BILIDIR, BILITOT, ALKPHOS in the last 72 hours. No results for input(s): INR in the last 72 hours. No results for input(s): CKTOTAL, CKMB, CKMBINDEX, TROPONINI in the last 72 hours. Imaging/Test Results    Cr 3.3  Hb 8.0  Blood cultures 3/17 kleb pnuemo pending  3/18 neg          Consults:     IP CONSULT TO HOSPITALIST  IP CONSULT TO UROLOGY  IP CONSULT TO ENDOCRINOLOGY  IP CONSULT TO DIETITIAN    Allergies  Patient has no known allergies.     Medications      Scheduled Meds:   atorvastatin  40 mg Oral Nightly    insulin lispro  0-6 Units SubCUTAneous TID WC    insulin glargine  10 Units SubCUTAneous Nightly    insulin lispro  10 Units SubCUTAneous TID WC    insulin lispro  0-6 Units SubCUTAneous Nightly    polyethylene glycol  17 g Oral Daily    meropenem  500 mg IntraVENous Q12H    sodium chloride flush  5-40 mL IntraVENous 2 times per day    heparin (porcine)  5,000 Units SubCUTAneous 3 times per day    amLODIPine  10 mg Oral Daily    allopurinol  100 mg Oral Daily    [Held by provider] pregabalin  50 mg Oral BID       Infusions:   dextrose      sodium chloride 25 mL (03/21/22 0338)       PRN Meds:  glucose, glucagon (rDNA), dextrose, dextrose bolus (hypoglycemia) **OR** dextrose bolus (hypoglycemia), HYDROmorphone, sodium chloride flush, sodium chloride, ondansetron **OR** ondansetron, polyethylene glycol, acetaminophen **OR** acetaminophen    ASSESSMENT AND PLAN      Active Hospital Problems    Diagnosis Date Noted    Sepsis secondary to UTI ([facility] [A41.9, N39.0] 03/17/2022         Victorina Guevara is a 78 y.o.  male who presents with Sepsis secondary to UTI ([facility]     Sepsis, Klebsiella, streptococcal viridans bacteremia Secondary to UTI   · Culture data noted, will change to cefadroxil     Left hydronephrosis:   · Status post cystoscopy, left ureteral stent exchange. Suprapubic catheter exchange. · Repeat CT abdomen neg, left ureter stent in place, no hydro        Acute metabolic encephalopathy: Multifactorial, improving      Chronically elevated troponin  , No chest pain      Acute kidney injury on chronic kidney disease  · Creatinine improving at 2 today. Continue monitoring.     Anion Gap Metabolic acidosis - Probably secondary to worsening renal function  · Resolved          Hypertension - Patient on amlodipine at home     Uncontrolled diabetes mellitus type 2, on long-term insulin  · Continue adjusted insulin therapy, avoid hypoglycemia.        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 CT head on admission with acute process. MRI head was nonacute-CT cerebral perfusion-hypoperfusion in the right MCA territory. Continue aspirin, statin. DVT Prophylaxis: SQ heparin  Diet: ADULT ORAL NUTRITION SUPPLEMENT; Lunch, Dinner; Frozen Oral Supplement  ADULT DIET; Regular; 5 carb choices (75 gm/meal)  ADULT ORAL NUTRITION SUPPLEMENT; Breakfast, Lunch, Dinner; Low Calorie/High Protein Oral Supplement  Code Status: Full Code      Dispo -   Awaiting improvement of renal function  Likely need ECF.     Slava Chew MD

## 2022-03-22 NOTE — PROGRESS NOTES
Nephrology Progress Note  3/22/2022 11:12 AM        Subjective:   Admit Date: 3/16/2022  PCP: Rosanna Graves MD    Interval History: Patient seen in early morning, this is a late entry    Diet: Per patient little bit better    ROS: No shortness of breath or confusion  he feels like he is getting little better  Urine output recorded only 650 cc for the last shift-may have been accurately recorded. No fever    Data:     Current meds:    atorvastatin  40 mg Oral Nightly    insulin lispro  0-6 Units SubCUTAneous TID WC    insulin glargine  10 Units SubCUTAneous Nightly    insulin lispro  10 Units SubCUTAneous TID WC    insulin lispro  0-6 Units SubCUTAneous Nightly    polyethylene glycol  17 g Oral Daily    meropenem  500 mg IntraVENous Q12H    sodium chloride flush  5-40 mL IntraVENous 2 times per day    heparin (porcine)  5,000 Units SubCUTAneous 3 times per day    amLODIPine  10 mg Oral Daily    allopurinol  100 mg Oral Daily    [Held by provider] pregabalin  50 mg Oral BID      dextrose      sodium chloride 25 mL (03/21/22 0338)         I/O last 3 completed shifts:   In: 240 [P.O.:240]  Out: 2650 [Urine:2650]    CBC:   Recent Labs     03/22/22  0823   WBC 8.7   HGB 8.2*             Recent Labs     03/21/22  0711 03/22/22  0823    140   K 4.2 4.4    105   CO2 26 23   BUN 46* 37*   CREATININE 2.2* 2.0*   GLUCOSE 35* 126*       Lab Results   Component Value Date    CALCIUM 8.5 03/22/2022    PHOS 3.3 03/19/2022       Objective:     Vitals: BP (!) 137/91   Pulse 78   Temp 97.6 °F (36.4 °C) (Axillary)   Resp 18   Ht 6' (1.829 m)   Wt 204 lb 8 oz (92.8 kg)   SpO2 99%   BMI 27.74 kg/m²     General appearance: Oriented with head of the bed elevated about 30 to 40 degrees  HEENT: No gross conjunctival pallor  Neck: Supple  Lungs: Coarse breath sound but no crackles  Heart: Regular rate and rhythm  Abdomen: Soft, nontender  Extremities: Trace edema and has a Hopper      Problem List : Impression :     1. Acute kidney injury with underlying CKD stage IV [id number] has an obstruction for a while, I did talk to Dr. Yancy Latham, etiology of obstruction is unknown to him. Status post appropriate intervention-kidney function improving by creatinine criteria  2. Left-sided recurrent hydronephrosis of unknown etiology-requiring surgical intervention underlying diabetes improved  3. Underlying diabetes ,as well as hypertension,    Recommendation/Plan  :     1. Okay to discharge from nephrology standpoint  2. Outpatient follow-up with me-in 3 to 4 weeks CMP, magnesium, phosphorus prior to appointment  3. With CBC differential  4. Close follow-up with urologist of course  5.  We will rearrange his blood pressure medication as an outpatient      Yehuda Rudolph MD MD

## 2022-03-22 NOTE — CONSULTS
Consult completed. Procedure/rationale explained to pt & consent obtained. #20ga PIV initiated using UltraSound-guided technique without difficulty/complications. Pt tolerated well and no other c/o or needs noted or reported.

## 2022-03-22 NOTE — PROGRESS NOTES
Occupational Therapy    [facility] ACUTE CARE OCCUPATIONAL THERAPY EVALUATION    Shekhar Rubin, 1940, 1353/0502-K, 3/22/2022    Discharge Recommendation: Dae Arevalo    History:  [place]:  The primary encounter diagnosis was Sepsis, due to unspecified organism, unspecified whether acute organ dysfunction present ([address] Diagnoses of Urinary tract infection associated with cystostomy catheter, initial encounter ([facility] [address] Encephalopathy, Acute pulmonary edema ([facility] [address] Elevated brain natriuretic peptide (BNP) level, Chronic kidney disease, unspecified CKD stage, and Type 2 diabetes mellitus with hyperglycemia, with long-term current use of insulin ([address] were also pertinent to this visit. Subjective:  Patient states: \"I'm sorry to put you through this. \" (pt referring to his overall weakness and needing significant assistance for all mobility)  Pain: Pt reported 10/10 Lt foot pain  Communication with other providers: ISHMAEL Tate  Restrictions: General Precautions, Fall Risk, Bed/chair alarm    Home Setup/Prior level of function:  Social/Functional History  Lives With: Son  Type of Home: House  Home Layout: One level  Home Access: Stairs to enter with rails  Entrance Stairs - Number of Steps: 2  Bathroom Shower/Tub: Tub/Shower unit  Bathroom Toilet: Standard  Bathroom Equipment: Grab bars in [facility] chair  Home Equipment: [address] walker  ADL Assistance: Independent  Homemaking Assistance: Independent  Ambulation Assistance: Independent (mod I with 4ww)  Transfer Assistance: Independent  Active : No  Additional Comments: Pt a poor historian this date. Should confirm above information with family. Examination:  · Observation: Supine in bed upon arrival. Very lethargic with delayed/inconsistent thought processing. Participatory with session however.   · Vision: [place]  · Hearing: Appears [place] (difficult to fully assess hearing vs thought processing)  · Vitals: Stable vitals throughout session    Body Systems and functions:  · ROM: Active shoulder flexion grossly 0-40' in BL UEs, WFL distally in BL UEs  · Strength: 2+/5 MMT shoulder flexors, 4/5 MMT elbow flexors, elbow extensors, and grasp in BL UEs  · Sensation: WFL (denies numbness/tingling)  · Tone: Normal  · Coordination: Significantly decreased speed and functional use of BL hands    Activities of Daily Living (ADLs):  · Feeding: Mod A (assist for placement of cup into pt's grasp and intermittent hand-over-hand assist for bringing to mouth)  · Grooming: Mod A (thoroughness with facial hygiene seated EOB; unable to complete in standing due to severe LE weakness)  · UB bathing: Mod A  · LB bathing: Max A   · UB dressing: Dependent (donning clean hospital gown)  · LB dressing: Dependent (donning BL socks)  · Toileting: Dependent     Cognitive and Psychosocial Functioning:  · Overall cognitive status: Impaired (pt with decreased alertness, very delayed and inconsistent thought processing, memory impairments, poor overall insight/safety awareness)  · Affect: Normal     Balance:   · Sitting: CGA static sitting EOB, Min A with dynamic sitting tasks  · Standing: Max A with RW for brief static standing (x2 trials; highly retropulsive)    Functional Mobility:  · Bed Mobility: Max A rolling both directions (max coaching for bending contralateral knee/reaching for bed rail), Max A supine to sitting EOB via log roll technique (max trunk boost required for uprighting from sidelying), Dependent sitting EOB to supine   · Transfers: Max A sit to stand from bed, Max A stand to sit to bed (max cues for technique/safe hand placement each direction)  · Ambulation: Unsafe/unable      AM-PAC 6 click short form for inpatient daily activity:   How much help from another person does the patient currently need. .. Unable  Dep A Lot  Max A A Lot   Mod A A Little  Min A A Little   CGA  SBA None   Mod I  Indep  Sup   1.   Putting on and taking off regular lower body clothing? [x] 1    [] 2   [] 2   [] 3   [] 3   [] 4      2. Bathing (including washing, rinsing, drying)? [] 1   [x] 2   [] 2 [] 3 [] 3 [] 4   3. Toileting, which includes using toilet, bedpan, or urinal? [x] 1    [] 2   [] 2   [] 3   [] 3   [] 4     4. Putting on and taking off regular upper body clothing? [x] 1   [] 2   [] 2   [] 3   [] 3    [] 4      5. Taking care of personal grooming such as brushing teeth? [] 1   [] 2    [x] 2 [] 3    [] 3   [] 4      6. Eating meals? [] 1   [] 2   [x] 2   [] 3   [] 3   [] 4      Raw Score:  9    [24=0% impaired(CH), 23=1-19%(CI), 20-22=20-39%(CJ), 15-19=40-59%(CK), 10-14=60-79%(CL), 7-9=80-99%(CM), 6=100%(CN)]     Treatment:  Therapeutic Activity Training:   Therapeutic activity training was instructed today. Cues were given for safety, sequence, UE/LE placement, awareness, and balance. Activities performed today included bed mobility training, transfer training, standing balance/tolerance with RW, education on role of OT, POC, importance of EOB/OOB activity, d/c planning and recommendations  Neuro-Muscular re-education:  Cues were given for position, posture, kinesthetic sense, safety, recruitment, and rationale. Cues were verbal and/or tactile. Activities performed today included static/dynamic sitting balance training EOB, weight-bearing through BL UEs, leaning and recovery to midline    Safety Measures: Gait belt used, Left in Bed, Alarm in place    Assessment:  Pt is an 78year old male with a past medical history of Acute urinary tract infection, Ataxia, Diabetes mellitus ([address] Fusion of spine of cervical region, Gait disturbance, History of prostate cancer, History of tobacco use, Hyperlipidemia, and Osteoarthritis. Pt admitted with acute metabolic encephalopathy secondary to sepsis/UTI. Pt lives at home with his son and reported baseline level of functioning is as above, although pt was a poor historian this date.  Pt currently presents with the above impairments, and will need continued OT services in SNF at discharge. Complexity: High  Prognosis: Good, Fair  Plan: 3x/week      Goals:  1. Pt will complete all aspects of bed mobility for EOB/OOB ADLs mod A  2. Pt will complete UB/LB bathing mod A with setup using long handled sponge PRN  3. Pt will complete all aspects of LB dressing mod A with setup using AE PRN  4. Pt will complete all functional transfers to and from bed, chair, BSC mod A  5. Pt will complete all aspects of toileting task max A  6. Pt will complete oral hygiene/grooming routine in unsupported sitting EOB SBA with setup  7.  Pt will complete self-feeding at chair level SBA with setup      Time:   Time in: 1555  Time out: 1630  Timed treatment minutes: 25  Total time: 35      Electronically signed by:    EMA Frey/L, [address] [facility], [id number]

## 2022-03-22 NOTE — PROGRESS NOTES
Physical Therapy    Facility/Department: [facility]  Initial Assessment    NAME: Radha Garcia  : 1940  MRN: [id number]    Date of Service: 3/22/2022    Discharge Recommendations:  Subacute/Skilled Nursing Facility        Assessment   Assessment: Pt is an 78 y.o. male with medical history, surgical history, co-morbidities, and personal factors including Acute urinary tract infection, Ataxia, Diabetes mellitus ([address] Fusion of spine of cervical region, Gait disturbance, History of prostate cancer, History of tobacco use, Hyperlipidemia, Osteoarthritis, Prostate surgery; other surgical history (3/28/13); Colon surgery; Bladder surgery; Prostatectomy (); and Bladder surgery (Left, 3/17/2022) with admission for Sepsis, Urinary tract infection associated with cystostomy catheter, Encephalopathy, Acute pulmonary edema, Elevated brain natriuretic peptide (BNP) level, Chronic kidney disease, and Type 2 diabetes mellitus with hyperglycemia. Prior to admission, pt was modified independent with functional mobility and ADLs. Examination of body systems reveals decreased strength, decreased balance, decreased aerobic capacity, impaired cognition, and decreased independence with functional mobility. Prognosis: Good  Decision Making: High Complexity  Clinical Presentation: unpredictable characteristics  PT Education: Goals;PT Role;Plan of Care;Precautions; Equipment; Functional Mobility Training;Transfer Training;Gait Training;General Safety; Adaptive Device Training  REQUIRES PT FOLLOW UP: Yes  Activity Tolerance  Activity Tolerance: Patient Tolerated treatment well           Restrictions  Restrictions/Precautions  Restrictions/Precautions: General Precautions,Fall Risk  Vision/Hearing  Vision: Within Functional Limits  Hearing: Exceptions to [facility]  Hearing Exceptions: Hard of hearing/hearing concerns     Subjective  General  Chart Reviewed: Yes  Patient assessed for rehabilitation services?: Yes  Family / Caregiver Present: No  Follows Commands: Impaired  Pain Screening  Patient Currently in Pain: Denies  Pain Assessment  Pain Assessment: 0-10  Pain Level: 7  Pain Type: Chronic pain;Acute pain  Pain Location: Leg  Pain Orientation: Right  Vital Signs  Patient Currently in Pain: Denies       Orientation  Orientation  Overall Orientation Status: Impaired  Orientation Level: Disoriented to time;Oriented to person  Social/Functional History  Social/Functional History  Lives With: Son  Type of Home: House  Home Layout: One level  Home Access: Stairs to enter with rails  Entrance Stairs - Number of Steps: 2  Bathroom Shower/Tub: Tub/Shower unit  Bathroom Toilet: Standard  Bathroom Equipment: Grab bars in shower,Shower chair  Home Equipment: Cane,4 wheeled walker,Rolling walker  ADL Assistance: Independent  Homemaking Assistance: Independent  Ambulation Assistance: Independent (mod I with 4ww)  Transfer Assistance: Independent  Cognition   Cognition  Overall Cognitive Status: Exceptions  Arousal/Alertness: Delayed responses to stimuli  Following Commands: Inconsistently follows commands  Attention Span: Attends with cues to redirect  Safety Judgement: Decreased awareness of need for safety;Decreased awareness of need for assistance  Problem Solving: Decreased awareness of errors  Insights: Decreased awareness of deficits  Initiation: Requires cues for some  Sequencing: Requires cues for some    Objective             Strength RLE  Comment: knee extension: 2/5, ankle DF: 3/5  Strength LLE  Comment: knee extension: 2/5, ankle DF: 3/5     Sensation  Overall Sensation Status: WNL  Bed mobility  Rolling to Left: Maximum assistance (with verbal and tactile cues for BUE and BLE placement throughout rolling)  Rolling to Right: Maximum assistance (with verbal and tactile cues for BUE and BLE placement throughout rolling)  Supine to Sit: Maximum assistance (with verbal and tactile cues for BUE and BLE placement throughout transfer; with HOB elevated; with increased time for task completion)  Sit to Supine: Maximum assistance  Transfers  Sit to Stand: Dependent/Total;Maximum Assistance (patient unable to extend hips or knees adequately in order to complete full sit to stand at this time)  Stand to sit: Dependent/Total;Maximum Assistance        Balance  Posture: Poor  Sitting - Static: Fair  Sitting - Dynamic: Fair;-  Comments: patient required assist varying from CGAx1 to modAx1 throughout static and dynamic sitting balance activity; patient provided with verbal and tactile cues to maintain upright posture in order to avoid COM shifting outside of TYSHAWN and in order to avoid retropulsion      Therapeutic Activity Training:   Therapeutic activity training was instructed today. Cues were given for safety, sequence, UE/LE placement, awareness, and balance. Activities performed today included bed mobility training, sup-sit, sit-stand. Increased time required for all task completion. Plan   Plan  Times per week: 3+  Current Treatment Recommendations: Strengthening,ROM,Balance Training,Functional Mobility Training,Transfer Training,ADL/Self-care Training,Gait Training,Patient/Caregiver Education & Training,IADL Training,Stair training,Equipment Evaluation, Education, & procurement,Neuromuscular Re-education,Pain Management,Home Exercise Program,Positioning,Endurance Training,Safety Education & Training,Wheelchair Mobility Training,Cognitive/Perceptual Training,Cognitive Reorientation  Safety Devices  Type of devices:  All fall risk precautions in place,Left in bed,Bed alarm in place,Call light within reach,Nurse notified,Gait belt,Patient at risk for falls      AM-PAC Score  AM-PAC Inpatient Mobility Raw Score : 8 (03/22/22 1558)  AM-PAC Inpatient T-Scale Score : 28.52 (03/22/22 1558)  Mobility Inpatient CMS 0-100% Score: 86.62 (03/22/22 1558)  Mobility Inpatient CMS G-Code Modifier : CM (03/22/22 1558)          Goals  Long term goals  Time

## 2022-03-23 ENCOUNTER — APPOINTMENT (OUTPATIENT)
Dept: ULTRASOUND IMAGING | Age: 82
DRG: 659 | End: 2022-03-23
Payer: MEDICARE

## 2022-03-23 LAB
ANION GAP SERPL CALCULATED.3IONS-SCNC: 16 MMOL/L (ref 4–16)
BASOPHILS ABSOLUTE: 0.1 K/CU MM
BASOPHILS RELATIVE PERCENT: 0.6 % (ref 0–1)
BUN BLDV-MCNC: 37 MG/DL (ref 6–23)
CALCIUM SERPL-MCNC: 9.3 MG/DL (ref 8.3–10.6)
CHLORIDE BLD-SCNC: 104 MMOL/L (ref 99–110)
CO2: 23 MMOL/L (ref 21–32)
CREAT SERPL-MCNC: 2.2 MG/DL (ref 0.9–1.3)
CULTURE: NORMAL
CULTURE: NORMAL
DIFFERENTIAL TYPE: ABNORMAL
EOSINOPHILS ABSOLUTE: 0.5 K/CU MM
EOSINOPHILS RELATIVE PERCENT: 5.6 % (ref 0–3)
GFR AFRICAN AMERICAN: 35 ML/MIN/1.73M2
GFR NON-AFRICAN AMERICAN: 29 ML/MIN/1.73M2
GLUCOSE BLD-MCNC: 104 MG/DL (ref 70–99)
GLUCOSE BLD-MCNC: 111 MG/DL (ref 70–99)
GLUCOSE BLD-MCNC: 130 MG/DL (ref 70–99)
GLUCOSE BLD-MCNC: 144 MG/DL (ref 70–99)
GLUCOSE BLD-MCNC: 168 MG/DL (ref 70–99)
GLUCOSE BLD-MCNC: 213 MG/DL (ref 70–99)
HCT VFR BLD CALC: 28.4 % (ref 42–52)
HEMOGLOBIN: 8.6 GM/DL (ref 13.5–18)
IMMATURE NEUTROPHIL %: 1 % (ref 0–0.43)
LYMPHOCYTES ABSOLUTE: 1.4 K/CU MM
LYMPHOCYTES RELATIVE PERCENT: 15.4 % (ref 24–44)
Lab: NORMAL
Lab: NORMAL
MCH RBC QN AUTO: 29.6 PG (ref 27–31)
MCHC RBC AUTO-ENTMCNC: 30.3 % (ref 32–36)
MCV RBC AUTO: 97.6 FL (ref 78–100)
MONOCYTES ABSOLUTE: 1 K/CU MM
MONOCYTES RELATIVE PERCENT: 11.4 % (ref 0–4)
NUCLEATED RBC %: 0 %
PDW BLD-RTO: 15.6 % (ref 11.7–14.9)
PLATELET # BLD: 289 K/CU MM (ref 140–440)
PMV BLD AUTO: 11.2 FL (ref 7.5–11.1)
POTASSIUM SERPL-SCNC: 5.2 MMOL/L (ref 3.5–5.1)
RBC # BLD: 2.91 M/CU MM (ref 4.6–6.2)
SEGMENTED NEUTROPHILS ABSOLUTE COUNT: 5.8 K/CU MM
SEGMENTED NEUTROPHILS RELATIVE PERCENT: 66 % (ref 36–66)
SODIUM BLD-SCNC: 143 MMOL/L (ref 135–145)
SPECIMEN: NORMAL
SPECIMEN: NORMAL
TOTAL IMMATURE NEUTOROPHIL: 0.09 K/CU MM
TOTAL NUCLEATED RBC: 0 K/CU MM
WBC # BLD: 8.8 K/CU MM (ref 4–10.5)

## 2022-03-23 PROCEDURE — 6360000002 HC RX W HCPCS: Performed by: INTERNAL MEDICINE

## 2022-03-23 PROCEDURE — 93970 EXTREMITY STUDY: CPT

## 2022-03-23 PROCEDURE — 6370000000 HC RX 637 (ALT 250 FOR IP): Performed by: INTERNAL MEDICINE

## 2022-03-23 PROCEDURE — 87040 BLOOD CULTURE FOR BACTERIA: CPT

## 2022-03-23 PROCEDURE — 6370000000 HC RX 637 (ALT 250 FOR IP): Performed by: HOSPITALIST

## 2022-03-23 PROCEDURE — 80048 BASIC METABOLIC PNL TOTAL CA: CPT

## 2022-03-23 PROCEDURE — 1200000000 HC SEMI PRIVATE

## 2022-03-23 PROCEDURE — 2580000003 HC RX 258: Performed by: INTERNAL MEDICINE

## 2022-03-23 PROCEDURE — 85025 COMPLETE CBC W/AUTO DIFF WBC: CPT

## 2022-03-23 PROCEDURE — 94761 N-INVAS EAR/PLS OXIMETRY MLT: CPT

## 2022-03-23 PROCEDURE — 87150 DNA/RNA AMPLIFIED PROBE: CPT

## 2022-03-23 PROCEDURE — 82962 GLUCOSE BLOOD TEST: CPT

## 2022-03-23 PROCEDURE — 87186 SC STD MICRODIL/AGAR DIL: CPT

## 2022-03-23 PROCEDURE — 36415 COLL VENOUS BLD VENIPUNCTURE: CPT

## 2022-03-23 RX ORDER — ASPIRIN 81 MG/1
81 TABLET, CHEWABLE ORAL DAILY
Status: DISCONTINUED | OUTPATIENT
Start: 2022-03-23 | End: 2022-03-30 | Stop reason: HOSPADM

## 2022-03-23 RX ORDER — GLIPIZIDE 5 MG/1
5 TABLET ORAL
Status: DISCONTINUED | OUTPATIENT
Start: 2022-03-23 | End: 2022-03-25

## 2022-03-23 RX ADMIN — ACETAMINOPHEN 650 MG: 325 TABLET ORAL at 21:44

## 2022-03-23 RX ADMIN — CEPHALEXIN 500 MG: 500 CAPSULE ORAL at 17:05

## 2022-03-23 RX ADMIN — HEPARIN SODIUM 5000 UNITS: 5000 INJECTION INTRAVENOUS; SUBCUTANEOUS at 14:13

## 2022-03-23 RX ADMIN — ALLOPURINOL 100 MG: 100 TABLET ORAL at 07:57

## 2022-03-23 RX ADMIN — POLYETHYLENE GLYCOL (3350) 17 G: 17 POWDER, FOR SOLUTION ORAL at 07:57

## 2022-03-23 RX ADMIN — INSULIN GLARGINE 10 UNITS: 100 INJECTION, SOLUTION SUBCUTANEOUS at 21:49

## 2022-03-23 RX ADMIN — CEPHALEXIN 500 MG: 500 CAPSULE ORAL at 05:52

## 2022-03-23 RX ADMIN — CEPHALEXIN 500 MG: 500 CAPSULE ORAL at 00:58

## 2022-03-23 RX ADMIN — AMLODIPINE BESYLATE 10 MG: 10 TABLET ORAL at 07:57

## 2022-03-23 RX ADMIN — SODIUM CHLORIDE, PRESERVATIVE FREE 10 ML: 5 INJECTION INTRAVENOUS at 07:57

## 2022-03-23 RX ADMIN — ATORVASTATIN CALCIUM 40 MG: 40 TABLET, FILM COATED ORAL at 21:44

## 2022-03-23 RX ADMIN — PREGABALIN 50 MG: 50 CAPSULE ORAL at 21:44

## 2022-03-23 RX ADMIN — HEPARIN SODIUM 5000 UNITS: 5000 INJECTION INTRAVENOUS; SUBCUTANEOUS at 21:44

## 2022-03-23 RX ADMIN — HEPARIN SODIUM 5000 UNITS: 5000 INJECTION INTRAVENOUS; SUBCUTANEOUS at 05:52

## 2022-03-23 RX ADMIN — ASPIRIN 81 MG CHEWABLE TABLET 81 MG: 81 TABLET CHEWABLE at 11:46

## 2022-03-23 RX ADMIN — CEPHALEXIN 500 MG: 500 CAPSULE ORAL at 14:12

## 2022-03-23 ASSESSMENT — PAIN SCALES - GENERAL
PAINLEVEL_OUTOF10: 2
PAINLEVEL_OUTOF10: 0

## 2022-03-23 ASSESSMENT — PAIN DESCRIPTION - FREQUENCY: FREQUENCY: CONTINUOUS

## 2022-03-23 ASSESSMENT — PAIN DESCRIPTION - PAIN TYPE
TYPE: ACUTE PAIN
TYPE: CHRONIC PAIN

## 2022-03-23 ASSESSMENT — PAIN DESCRIPTION - DESCRIPTORS: DESCRIPTORS: ACHING

## 2022-03-23 ASSESSMENT — PAIN DESCRIPTION - PROGRESSION: CLINICAL_PROGRESSION: NOT CHANGED

## 2022-03-23 ASSESSMENT — PAIN DESCRIPTION - ONSET: ONSET: ON-GOING

## 2022-03-23 ASSESSMENT — PAIN DESCRIPTION - LOCATION: LOCATION: GENERALIZED

## 2022-03-23 NOTE — CARE COORDINATION
This RN CM to bedside to speak with pt about PT / OT recommendations. Pt states he would like a referral sent to [facility] as this was mentioned to him by staff. I advised him that I would make the referral but going there relies on his participation and insurance. Verbalized understanding. 1107 - LVM for [facility] asking for her to review pt.

## 2022-03-23 NOTE — DISCHARGE INSTR - COC
Continuity of Care Form    Patient Name: Shakir Becerra   :  1940  MRN:  [id number]    [facility] date:  3/16/2022  Discharge date:  ***    Code Status Order: Full Code   Advance Directives:   Advance Care Flowsheet Documentation       Date/Time Healthcare Directive Type of Healthcare Directive Copy in [address] Agent's Name Healthcare Agent's Phone Number    22 1500 Yes, patient has an advance directive for healthcare treatment -- No, copy requested from family -- -- --            Admitting Physician:  Veronica Crowe MD  PCP: Estella Allred MD    Discharging Nurse: [facility] Unit/Room#: 4334/3130-S  Discharging Unit Phone Number: ***    Emergency Contact:   Extended Emergency Contact Information  Primary Emergency Contact: [address] Phone: [phone]  Relation: Child  Secondary Emergency Contact: [address], [address] Phone: [phone]  Mobile Phone: [phone]  Relation: Child  Preferred language: English   needed? No    Past Surgical History:  Past Surgical History:   Procedure Laterality Date    BLADDER SURGERY      BLADDER SURGERY Left 3/17/2022    CYSTOSCOPY LEFT STENT EXCHANGE performed by Mat Lugo MD at [facility]  3/28/13    Cysto with removal of artificial sphinter and placement of suprapubic catheter. PROSTATE SURGERY      PROSTATECTOMY      infection       Immunization History: There is no immunization history on file for this patient.     Active Problems:  Patient Active Problem List   Diagnosis Code    Gait disturbance R26.9    Generalized weakness R53.1    Diabetes mellitus ([address] E11.9    Osteoarthritis M19.90    Anemia of chronic disorder D63.8    Infected implanted bladder sphincter cuff T83.69XA    Escherichia coli urinary tract infection N39.0, B96.20    Hypertension I10    Sepsis ([address] A41.9    Type 2 diabetes mellitus with hypoglycemia without coma ([facility] [address] E11.649    UTI (urinary tract infection) due to urinary indwelling catheter ([facility] [address] T83.511A, N39.0    Hyperkalemia E87.5    Acute kidney failure ([facility] [address] N17.9    Leg weakness, bilateral R29.898    Type 2 diabetes mellitus with neurological manifestations, uncontrolled (HCC) V39.12, O77.71    Complicated UTI (urinary tract infection) N39.0    Essential hypertension I10    Severe muscle deconditioning R29.898    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 (HCC) E11.69, E78.5    Frequent falls R29.6    Chronic kidney disease, stage 3a ([facility] [address] N18.31    Uncontrolled type 2 diabetes mellitus with peripheral neuropathy ([facility] [address] E11.42, E11.65    Prostate cancer ([facility] [address] C61    Suprapubic catheter ([facility] [address] Z93.59    Sepsis secondary to UTI ([facility] [address] A41.9, N39.0       Isolation/Infection:   Isolation            Contact          Patient Infection Status       Infection Onset Added Last Indicated Last Indicated By Review Planned Expiration Resolved Resolved By    ESBL (Extended Spectrum Beta Lactamase) 21 Culture, Urine        MDRO (multi-drug resistant organism) 21 Culture, Urine        Resolved    COVID-19 (Rule Out) 22 COVID-19, Rapid (Ordered)   22 Rule-Out Test Resulted    COVID-19 22 COVID-19, Rapid   22     COVID-19 (Rule Out) 22 COVID-19, Rapid (Ordered)   22 Rule-Out Test Resulted    COVID-19 (Rule Out) 21 COVID-19, Rapid (Ordered)   21 Rule-Out Test Resulted    MRSA  16 Esther Ortiz RN   18 Esther Ortiz RN    16 urine            Nurse Assessment:  Last Vital Signs: BP (!) 155/67   Pulse 84   Temp 99 °F (37.2 °C) (Oral)   Resp 16   Ht 6' (1.829 m)   Wt 204 lb 8 oz (92.8 kg)   SpO2 98%   BMI 27.74 kg/m²     Last documented pain score (0-10 scale): Pain Level: 2  Last Weight:   Wt Readings from Last 1 Encounters: 22 204 lb 8 oz (92.8 kg)     Mental Status:  {IP PT MENTAL STATUS:13301}    IV Access:  { MYRIAM IV ACCESS:[id number]}    Nursing Mobility/ADLs:  Walking   {[facility] DME IFHV:[id number]}  Transfer  {[facility] DME GFSW:234245829}  Bathing  {[facility] DME OTKC:724540595}  Dressing  {[facility] DME NICKOLAS:311879782}  Toileting  {[facility] DME OOLA:[id number]}  Feeding  {[facility] DME KKIL:839961861}  Med Admin  {[facility] DME LLSF:543097416}  Med Delivery   { MYRIAM MED Delivery:068072824}    Wound Care Documentation and Therapy:  Wound 22 Heel slightly purple round size of dime non open area (Active)   Wound Cleansed Other (Comment) 22   Dressing/Treatment Open to air 22 0807   Number of days: 0       Wound 22 Groin Left;Right; Inner red and excoriated (Active)   Wound Cleansed Other (Comment) 22   Dressing/Treatment Antibacterial ointment;Moisture barrier 22 1920   Number of days: 0       Wound 22 Mid abdominal folds super pubic area redness (Active)   Wound Cleansed Other (Comment) 22 1920   Number of days: 0        Elimination:  Continence:    Bowel: {YES / XW:93283}  Bladder: {YES / U}  Urinary Catheter: {Urinary Catheter:723608394}   Colostomy/Ileostomy/Ileal Conduit: {YES / KF:07036}       Date of Last BM: ***    Intake/Output Summary (Last 24 hours) at 3/23/2022 0925  Last data filed at 3/23/2022 0757  Gross per 24 hour   Intake 10 ml   Output 650 ml   Net -640 ml     I/O last 3 completed shifts:  In: -   Out: 1300 [Urine:1300]    Safety Concerns:     [address] [employer] MYRIAM Safety Concerns:562823069}    Impairments/Disabilities:      [address] [employer] MYRIAM Impairments/Disabilities:814766461}      Patient's personal belongings (please select all that are sent with patient):  {[facility] DME Belongings:956098196}    RN SIGNATURE:  {Esignature:911590485}    CASE MANAGEMENT/SOCIAL WORK SECTION    Inpatient Status Date: ***    Readmission Risk Assessment Score:  Readmission Risk              Risk of Unplanned Readmission:  29

## 2022-03-23 NOTE — PROGRESS NOTES
Nephrology Progress Note  3/23/2022 7:50 AM        Subjective:   Admit Date: 3/16/2022  PCP: Vickey Smith MD    Interval History: Patient reported he is not feeling good today    Diet: Some    ROS: Generalized fatigue, tiredness-generalized pain  No fever and acceptable blood pressure    Data:     Current meds:    glipiZIDE  5 mg Oral BID AC    cephALEXin  500 mg Oral 4 times per day    atorvastatin  40 mg Oral Nightly    insulin lispro  0-6 Units SubCUTAneous TID WC    insulin glargine  10 Units SubCUTAneous Nightly    insulin lispro  0-6 Units SubCUTAneous Nightly    polyethylene glycol  17 g Oral Daily    sodium chloride flush  5-40 mL IntraVENous 2 times per day    heparin (porcine)  5,000 Units SubCUTAneous 3 times per day    amLODIPine  10 mg Oral Daily    allopurinol  100 mg Oral Daily    [Held by provider] pregabalin  50 mg Oral BID      dextrose      sodium chloride 25 mL (03/21/22 0338)         I/O last 3 completed shifts:  In: -   Out: 1300 [Urine:1300]    CBC:   Recent Labs     03/22/22  0823   WBC 8.7   HGB 8.2*             Recent Labs     03/21/22  0711 03/22/22  0823    140   K 4.2 4.4    105   CO2 26 23   BUN 46* 37*   CREATININE 2.2* 2.0*   GLUCOSE 35* 126*       Lab Results   Component Value Date    CALCIUM 8.5 03/22/2022    PHOS 3.3 03/19/2022       Objective:     Vitals: BP (!) 142/68   Pulse 82   Temp 99 °F (37.2 °C) (Oral)   Resp 14   Ht 6' (1.829 m)   Wt 204 lb 8 oz (92.8 kg)   SpO2 100%   BMI 27.74 kg/m²     General appearance: Alert, awake and oriented with head of the bed elevated about 20 to 30 degrees  HEENT: Positive conjunctival pallor  Neck: Supple  Lungs: No gross crackles on auscultation but has few rhonchi  Heart: Seems regular rate and rhythm  Abdomen: Abdomen is slightly distended and tender on deep palpation but has positive bowel sound  Extremities: Trace edema in both legs      Problem List :         Impression :     1.  Acute kidney injury with underlying CKD stage IV A3-recovering mainly from obstruction and perhaps infection-of unknown etiology-it is a chronic process  2. Underlying diabetes and hypertension    Recommendation/Plan  :     1. May discharge from my standpoint  2. She will need renin-angiotensin aldosterone blocker as an outpatient-I can rearrange his blood pressure medications slowly but surely as an outpatient  3. Low-salt, good glycemic control  4. Follow clinically  5.  I can follow him up as an outpatient in 3 to 4 weeks, he will need a CMP, magnesium, phosphorus prior to his appointment with me      Fernandez Aguilar MD MD

## 2022-03-23 NOTE — PROGRESS NOTES
Hospitalist Progress Note      PCP: Estella Allred MD    Date of Admission: 3/16/2022    Chief Complaint on Admission: flank pain    Pt Seen/Examined and Chart Reviewed. Admitting dx sepsis    SUBJECTIVE:     Patient seen and examined  No new complaint other than generalized weakness, diffuse pain. OBJECTIVE:   Vitals    TEMPERATURE:  Current - Temp: 99 °F (37.2 °C); Max - Temp  Av.1 °F (37.3 °C)  Min: 97.6 °F (36.4 °C)  Max: 100 °F (37.8 °C)  RESPIRATIONS RANGE: Resp  Avg: 15  Min: 12  Max: 18  PULSE RANGE: Pulse  Av.2  Min: 78  Max: 85  BLOOD PRESSURE RANGE:  Systolic (01XQV), XBF:225 , Min:137 , MEO:338   ; Diastolic (86DOO), XVF:04, Min:63, Max:91    PULSE OXIMETRY RANGE: SpO2  Av %  Min: 98 %  Max: 100 %  24HR INTAKE/OUTPUT:      Intake/Output Summary (Last 24 hours) at 3/23/2022 1020  Last data filed at 3/23/2022 0757  Gross per 24 hour   Intake 10 ml   Output 650 ml   Net -640 ml       Exam:    Gen: Not in distress. Alert. Chronically ill  Head: Normocephalic. Atraumatic. Eyes: Conjunctivae/corneas clear. ENT: Oral mucosa moist  Neck: No JVD. No obvious thyromegaly. CVS: Nml S1S2, no murmur  , RRR  Pulmomary: Clear bilaterally. No crackles. No wheezes. Gastrointestinal: Soft, non tender, non distend, . Musculoskeletal: Diffuse body tenderness neuro: No focal deficit. Moves extremity spontaneously. Lower extremity edema. Psychiatry: Appropriate affect. Not agitated. .      Data    Recent Labs     22  0823 22  0721   WBC 8.7 8.8   HGB 8.2* 8.6*   HCT 26.2* 28.4*    289      Recent Labs     22  0711 22  0823 22  0721    140 143   K 4.2 4.4 5.2*    105 104   CO2    BUN 46* 37* 37*   CREATININE 2.2* 2.0* 2.2*     No results for input(s): AST, ALT, ALB, BILIDIR, BILITOT, ALKPHOS in the last 72 hours. No results for input(s): INR in the last 72 hours.   No results for input(s): CKTOTAL, CKMB, CKMBINDEX, TROPONINI in the last 72 hours. Imaging/Test Results  Laboratory data personally reviewed        Consults:     IP CONSULT TO HOSPITALIST  IP CONSULT TO UROLOGY  IP CONSULT TO ENDOCRINOLOGY  IP CONSULT TO DIETITIAN  IP CONSULT TO IV TEAM    Allergies  Patient has no known allergies. Medications      Scheduled Meds:   glipiZIDE  5 mg Oral BID AC    cephALEXin  500 mg Oral 4 times per day    atorvastatin  40 mg Oral Nightly    insulin lispro  0-6 Units SubCUTAneous TID WC    insulin glargine  10 Units SubCUTAneous Nightly    insulin lispro  0-6 Units SubCUTAneous Nightly    polyethylene glycol  17 g Oral Daily    sodium chloride flush  5-40 mL IntraVENous 2 times per day    heparin (porcine)  5,000 Units SubCUTAneous 3 times per day    amLODIPine  10 mg Oral Daily    allopurinol  100 mg Oral Daily    pregabalin  50 mg Oral BID       Infusions:   dextrose      sodium chloride 25 mL (03/21/22 0338)       PRN Meds:  oxyCODONE, glucose, glucagon (rDNA), dextrose, dextrose bolus (hypoglycemia) **OR** dextrose bolus (hypoglycemia), HYDROmorphone, sodium chloride flush, sodium chloride, ondansetron **OR** ondansetron, polyethylene glycol, acetaminophen **OR** acetaminophen    ASSESSMENT AND PLAN      Active Hospital Problems    Diagnosis Date Noted    Sepsis secondary to UTI ([address] [A41.9, N39.0] 03/17/2022         Puneet Rodriguez is a 78 y.o.  male  who presents with Sepsis secondary to UTI ([address]     Sepsis, Klebsiella, streptococcal viridans bacteremia Secondary to UTI   · Culture data noted, will change to cefadroxil  · Repeat blood culture in the light of low-grade fever.     Left hydronephrosis:   · Status post cystoscopy, left ureteral stent exchange. 3/17/22  ·   Suprapubic catheter exchange. · Repeat CT abdomen neg, left ureter stent in place, no hydro        Acute kidney injury on chronic kidney disease  Creatinine stabilized at 2.2. Continue monitoring. Okay for discharge for nephrology.   Outpatient follow-up with them. Acute metabolic encephalopathy: Waxing and waning. -CT scan of the head without large territorial infarct or bleeding.  -Has a prior history of CVA, left-sided hemiparesis    -Continue aspirin, statin.       Anion Gap Metabolic acidosis - Probably secondary to worsening renal function  · Resolved       Diffuse myofascial pain  Check CK level, check lower extremity Doppler, resume Lyrica      Chronically elevated troponin  , No chest pain      Hypertension - Patient on amlodipine at home     Uncontrolled diabetes mellitus type 2, on long-term insulin  · Monitor blood sugar, continue insulin therapy. ,  Continue glipizide.        Chronic anemia--multifactorial, monitoring    Hyperuricemia-on allopurinol     History of prostate cancer     DVT Prophylaxis: SQ heparin  Diet: ADULT ORAL NUTRITION SUPPLEMENT; Lunch, Dinner; Frozen Oral Supplement  ADULT DIET; Regular; 5 carb choices (75 gm/meal)  ADULT ORAL NUTRITION SUPPLEMENT; Breakfast; Fortified Pudding Oral Supplement  Code Status: Full Code      Dispo -   Awaiting repeated blood culture, if remain negative in 48 hours, patient can be discharged, likely need ECF.     Milla Rao MD

## 2022-03-23 NOTE — CARE COORDINATION
Received referral for ARU. Will review patients clinicals and PT/OT notes. Thank you for the referral.     1500:   Following for OT eval.

## 2022-03-24 LAB
ANION GAP SERPL CALCULATED.3IONS-SCNC: 14 MMOL/L (ref 4–16)
BASOPHILS ABSOLUTE: 0 K/CU MM
BASOPHILS RELATIVE PERCENT: 0.4 % (ref 0–1)
BUN BLDV-MCNC: 38 MG/DL (ref 6–23)
CALCIUM SERPL-MCNC: 9.2 MG/DL (ref 8.3–10.6)
CHLORIDE BLD-SCNC: 105 MMOL/L (ref 99–110)
CO2: 23 MMOL/L (ref 21–32)
CREAT SERPL-MCNC: 2.2 MG/DL (ref 0.9–1.3)
DIFFERENTIAL TYPE: ABNORMAL
EOSINOPHILS ABSOLUTE: 0.6 K/CU MM
EOSINOPHILS RELATIVE PERCENT: 7.1 % (ref 0–3)
GFR AFRICAN AMERICAN: 35 ML/MIN/1.73M2
GFR NON-AFRICAN AMERICAN: 29 ML/MIN/1.73M2
GLUCOSE BLD-MCNC: 132 MG/DL (ref 70–99)
GLUCOSE BLD-MCNC: 200 MG/DL (ref 70–99)
GLUCOSE BLD-MCNC: 227 MG/DL (ref 70–99)
GLUCOSE BLD-MCNC: 231 MG/DL (ref 70–99)
GLUCOSE BLD-MCNC: 235 MG/DL (ref 70–99)
HCT VFR BLD CALC: 26.2 % (ref 42–52)
HEMOGLOBIN: 8.1 GM/DL (ref 13.5–18)
IMMATURE NEUTROPHIL %: 0.7 % (ref 0–0.43)
LYMPHOCYTES ABSOLUTE: 0.9 K/CU MM
LYMPHOCYTES RELATIVE PERCENT: 11.1 % (ref 24–44)
MCH RBC QN AUTO: 29.7 PG (ref 27–31)
MCHC RBC AUTO-ENTMCNC: 30.9 % (ref 32–36)
MCV RBC AUTO: 96 FL (ref 78–100)
MONOCYTES ABSOLUTE: 0.9 K/CU MM
MONOCYTES RELATIVE PERCENT: 10.2 % (ref 0–4)
NUCLEATED RBC %: 0 %
PDW BLD-RTO: 15.3 % (ref 11.7–14.9)
PLATELET # BLD: 362 K/CU MM (ref 140–440)
PMV BLD AUTO: 11.1 FL (ref 7.5–11.1)
POTASSIUM SERPL-SCNC: 4.7 MMOL/L (ref 3.5–5.1)
RBC # BLD: 2.73 M/CU MM (ref 4.6–6.2)
SEGMENTED NEUTROPHILS ABSOLUTE COUNT: 6 K/CU MM
SEGMENTED NEUTROPHILS RELATIVE PERCENT: 70.5 % (ref 36–66)
SODIUM BLD-SCNC: 142 MMOL/L (ref 135–145)
TOTAL IMMATURE NEUTOROPHIL: 0.06 K/CU MM
TOTAL NUCLEATED RBC: 0 K/CU MM
WBC # BLD: 8.4 K/CU MM (ref 4–10.5)

## 2022-03-24 PROCEDURE — 6370000000 HC RX 637 (ALT 250 FOR IP): Performed by: INTERNAL MEDICINE

## 2022-03-24 PROCEDURE — 94761 N-INVAS EAR/PLS OXIMETRY MLT: CPT

## 2022-03-24 PROCEDURE — 36415 COLL VENOUS BLD VENIPUNCTURE: CPT

## 2022-03-24 PROCEDURE — 2580000003 HC RX 258: Performed by: INTERNAL MEDICINE

## 2022-03-24 PROCEDURE — 6360000002 HC RX W HCPCS: Performed by: INTERNAL MEDICINE

## 2022-03-24 PROCEDURE — 6370000000 HC RX 637 (ALT 250 FOR IP): Performed by: HOSPITALIST

## 2022-03-24 PROCEDURE — 85025 COMPLETE CBC W/AUTO DIFF WBC: CPT

## 2022-03-24 PROCEDURE — 80048 BASIC METABOLIC PNL TOTAL CA: CPT

## 2022-03-24 PROCEDURE — 82962 GLUCOSE BLOOD TEST: CPT

## 2022-03-24 PROCEDURE — 6360000002 HC RX W HCPCS: Performed by: HOSPITALIST

## 2022-03-24 PROCEDURE — 1200000000 HC SEMI PRIVATE

## 2022-03-24 RX ORDER — CHLORTHALIDONE 25 MG/1
25 TABLET ORAL DAILY
Status: DISCONTINUED | OUTPATIENT
Start: 2022-03-24 | End: 2022-03-28

## 2022-03-24 RX ORDER — VANCOMYCIN 2 G/400ML
2000 INJECTION, SOLUTION INTRAVENOUS EVERY 12 HOURS
Status: DISCONTINUED | OUTPATIENT
Start: 2022-03-24 | End: 2022-03-24

## 2022-03-24 RX ORDER — AMLODIPINE BESYLATE 5 MG/1
5 TABLET ORAL DAILY
Status: DISCONTINUED | OUTPATIENT
Start: 2022-03-24 | End: 2022-03-25

## 2022-03-24 RX ORDER — VANCOMYCIN 2 G/400ML
2000 INJECTION, SOLUTION INTRAVENOUS ONCE
Status: COMPLETED | OUTPATIENT
Start: 2022-03-24 | End: 2022-03-24

## 2022-03-24 RX ADMIN — POLYETHYLENE GLYCOL (3350) 17 G: 17 POWDER, FOR SOLUTION ORAL at 07:54

## 2022-03-24 RX ADMIN — INSULIN GLARGINE 10 UNITS: 100 INJECTION, SOLUTION SUBCUTANEOUS at 22:09

## 2022-03-24 RX ADMIN — ALLOPURINOL 100 MG: 100 TABLET ORAL at 07:54

## 2022-03-24 RX ADMIN — HEPARIN SODIUM 5000 UNITS: 5000 INJECTION INTRAVENOUS; SUBCUTANEOUS at 13:25

## 2022-03-24 RX ADMIN — GLIPIZIDE 5 MG: 5 TABLET ORAL at 16:36

## 2022-03-24 RX ADMIN — HEPARIN SODIUM 5000 UNITS: 5000 INJECTION INTRAVENOUS; SUBCUTANEOUS at 05:33

## 2022-03-24 RX ADMIN — CEPHALEXIN 500 MG: 500 CAPSULE ORAL at 05:32

## 2022-03-24 RX ADMIN — SODIUM CHLORIDE 25 ML: 9 INJECTION, SOLUTION INTRAVENOUS at 17:37

## 2022-03-24 RX ADMIN — HEPARIN SODIUM 5000 UNITS: 5000 INJECTION INTRAVENOUS; SUBCUTANEOUS at 22:06

## 2022-03-24 RX ADMIN — ACETAMINOPHEN 650 MG: 325 TABLET ORAL at 05:33

## 2022-03-24 RX ADMIN — CEPHALEXIN 500 MG: 500 CAPSULE ORAL at 11:39

## 2022-03-24 RX ADMIN — CHLORTHALIDONE 25 MG: 25 TABLET ORAL at 11:40

## 2022-03-24 RX ADMIN — ACETAMINOPHEN 650 MG: 325 TABLET ORAL at 16:36

## 2022-03-24 RX ADMIN — AMLODIPINE BESYLATE 5 MG: 10 TABLET ORAL at 07:54

## 2022-03-24 RX ADMIN — SODIUM CHLORIDE, PRESERVATIVE FREE 10 ML: 5 INJECTION INTRAVENOUS at 21:30

## 2022-03-24 RX ADMIN — ASPIRIN 81 MG CHEWABLE TABLET 81 MG: 81 TABLET CHEWABLE at 07:53

## 2022-03-24 RX ADMIN — PREGABALIN 50 MG: 50 CAPSULE ORAL at 22:06

## 2022-03-24 RX ADMIN — PREGABALIN 50 MG: 50 CAPSULE ORAL at 07:54

## 2022-03-24 RX ADMIN — VANCOMYCIN 2000 MG: 2 INJECTION, SOLUTION INTRAVENOUS at 17:50

## 2022-03-24 RX ADMIN — CEPHALEXIN 500 MG: 500 CAPSULE ORAL at 00:00

## 2022-03-24 RX ADMIN — ATORVASTATIN CALCIUM 40 MG: 40 TABLET, FILM COATED ORAL at 22:06

## 2022-03-24 RX ADMIN — CEPHALEXIN 500 MG: 500 CAPSULE ORAL at 17:37

## 2022-03-24 RX ADMIN — OXYCODONE HYDROCHLORIDE 5 MG: 5 TABLET ORAL at 05:33

## 2022-03-24 RX ADMIN — SODIUM CHLORIDE, PRESERVATIVE FREE 10 ML: 5 INJECTION INTRAVENOUS at 07:53

## 2022-03-24 ASSESSMENT — PAIN DESCRIPTION - DESCRIPTORS
DESCRIPTORS: HEADACHE
DESCRIPTORS: ACHING

## 2022-03-24 ASSESSMENT — PAIN DESCRIPTION - PAIN TYPE
TYPE: ACUTE PAIN
TYPE: ACUTE PAIN

## 2022-03-24 ASSESSMENT — PAIN DESCRIPTION - LOCATION
LOCATION: HEAD
LOCATION: GENERALIZED

## 2022-03-24 ASSESSMENT — PAIN SCALES - GENERAL
PAINLEVEL_OUTOF10: 0
PAINLEVEL_OUTOF10: 0
PAINLEVEL_OUTOF10: 6
PAINLEVEL_OUTOF10: 3
PAINLEVEL_OUTOF10: 2
PAINLEVEL_OUTOF10: 0

## 2022-03-24 ASSESSMENT — PAIN DESCRIPTION - ONSET
ONSET: ON-GOING
ONSET: GRADUAL

## 2022-03-24 ASSESSMENT — PAIN DESCRIPTION - PROGRESSION
CLINICAL_PROGRESSION: NOT CHANGED
CLINICAL_PROGRESSION: NOT CHANGED

## 2022-03-24 ASSESSMENT — PAIN DESCRIPTION - FREQUENCY
FREQUENCY: CONTINUOUS
FREQUENCY: CONTINUOUS

## 2022-03-24 NOTE — PROGRESS NOTES
Occupational Therapy  . Occupational Therapy Treatment Note      Name: Kiko Burns MRN: [id number] :   1940   Date:  3/24/2022   Admission Date: 3/16/2022 Room:  [address] [place] [address]-A     attempted too see patient  At 26. patient extremely fatigued. BALBUENA and PTA decided not appropriate/safe for EOB/OOB activity d/t fatigue. Per nursing patient patient was up in chair since 900. And it was Max x2 to get back to bed. Will keep patient on schedule and see next day early.        Electronically signed by:    KHARI Lima COTA/L [id number]    3/24/2022, 4:36 PM

## 2022-03-24 NOTE — PROGRESS NOTES
Noted the positivity of the blood culture for staphylococcal species. Could be contaminant, in the meantime await final result, pharmacy consulted for vancomycin initiation.

## 2022-03-24 NOTE — PROGRESS NOTES
Progress Note( Dr. Chirag Stacy)  3/23/2022  Subjective:   Admit Date: 3/16/2022  PCP: Satish Childers MD    Admitted For :Sepsis from UTI generalized  weakness    Consulted For: Better control of blood glucose    Interval History: feels better  Still feels weak     Denies any chest pains,   Denies SOB . Denies nausea or vomiting. No new bowel or bladder symptoms. Intake/Output Summary (Last 24 hours) at 3/23/2022 2150  Last data filed at 3/23/2022 1842  Gross per 24 hour   Intake 130 ml   Output 1050 ml   Net -920 ml       DATA    CBC:   Recent Labs     03/22/22  0823 03/23/22  0721   WBC 8.7 8.8   HGB 8.2* 8.6*    289    CMP:  Recent Labs     03/21/22  0711 03/22/22  0823 03/23/22  0721    140 143   K 4.2 4.4 5.2*    105 104   CO2 26 23 23   BUN 46* 37* 37*   CREATININE 2.2* 2.0* 2.2*   CALCIUM 8.4 8.5 9.3     Lipids: No results found for: CHOL, HDL, TRIG  Glucose:  Recent Labs     03/23/22  1118 03/23/22  1559 03/23/22  2134   POCGLU 168* 144* 213*     [id number]:  Lab Results   Component Value Date    LABA1C 9.2 03/22/2022     High Sensitivity TSH:   Lab Results   Component Value Date    TSHHS 3.720 02/11/2018     Free T3: No results found for: FT3  Free T4:No results found for: T4FREE    XR HIP 2-3 VW W PELVIS LEFT   Final Result   No acute abnormality or arthropathy of the hip. CT ABDOMEN PELVIS WO CONTRAST Additional Contrast? None   Final Result   1. Well-positioned left ureteric stent with no evidence of hydronephrosis,   renal abscess or other acute abnormality. Normal right kidney. 2. Minimal atelectatic changes in both lower lobes. 3. Well-positioned suprapubic catheter in the urinary bladder. FL LESS THAN 1 HOUR   Final Result      CT ABDOMEN PELVIS WO CONTRAST Additional Contrast? None   Final Result   1. There is left-sided hydronephrosis and hydroureter despite the presence   of the left ureteral stent.   Increased left perinephric stranding and fluid   compared to the right side. Will need to correlate for functionality of the   stent versus ascending urinary tract infection. 2.  The urinary bladder is collapsed around the suprapubic catheter. The   distal coil of the ureteral stent courses into the urinary bladder along the   inferior aspect or an expanded upper portion of the urethral junction. Previous prostate surgery is again noted. 3.  Constipation in the ascending and transverse colon. No small bowel   obstruction or pneumoperitoneum. 4.  Mild thickening of the distal esophagus may relate to reflux esophagitis. CT HEAD WO CONTRAST   Final Result   No acute intracranial abnormality. MRI may be obtained if clinically   indicated. XR CHEST PORTABLE   Final Result   1. Worsening pulmonary edema.          VL DUP LOWER EXTREMITY VENOUS BILATERAL    (Results Pending)        Scheduled Medicines   Medications:    glipiZIDE  5 mg Oral BID AC    aspirin  81 mg Oral Daily    cephALEXin  500 mg Oral 4 times per day    atorvastatin  40 mg Oral Nightly    insulin lispro  0-6 Units SubCUTAneous TID WC    insulin glargine  10 Units SubCUTAneous Nightly    insulin lispro  0-6 Units SubCUTAneous Nightly    polyethylene glycol  17 g Oral Daily    sodium chloride flush  5-40 mL IntraVENous 2 times per day    heparin (porcine)  5,000 Units SubCUTAneous 3 times per day    amLODIPine  10 mg Oral Daily    allopurinol  100 mg Oral Daily    pregabalin  50 mg Oral BID      Infusions:    dextrose      sodium chloride 25 mL (03/21/22 0338)         Objective:   Vitals: BP (!) 146/59   Pulse 84   Temp 100 °F (37.8 °C) (Oral)   Resp 16   Ht 6' (1.829 m)   Wt 204 lb 8 oz (92.8 kg)   SpO2 95%   BMI 27.74 kg/m²   General appearance: alert and cooperative with exam  Neck: no JVD or bruit  Thyroid : Normal lobes   Lungs: Has Vesicular Breath sounds   Heart:  regular rate and rhythm  Abdomen: soft, non-tender; bowel sounds normal; no masses,  no organomegaly has Surapubic Catheter   Musculoskeletal: Normal  Extremities: extremities normal, , no edema  Neurologic:  Awake, alert, oriented to name, place and time. Cranial nerves II-XII are grossly intact. Motor weakness . Sensory is intact. ,  and gait is abnormal.unstable     Assessment:     Patient Active Problem List:     Gait disturbance     Generalized weakness     Diabetes mellitus (HCC)     Osteoarthritis     Anemia of chronic disorder     Infected implanted bladder sphincter cuff     Escherichia coli urinary tract infection     Hypertension     Sepsis ([address]     Type 2 diabetes mellitus with hypoglycemia without coma ([address]     UTI (urinary tract infection) due to urinary indwelling catheter (HCC)     Hyperkalemia     Acute kidney failure (HCC)     Leg weakness, bilateral     Type 2 diabetes mellitus with neurological manifestations, uncontrolled ([address]     Complicated UTI (urinary tract infection)     Essential hypertension     Severe muscle deconditioning     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 (HCC)     Frequent falls     Chronic kidney disease, stage 3a ([address]     Uncontrolled type 2 diabetes mellitus with peripheral neuropathy (HCC)     Prostate cancer ([address]     Suprapubic catheter ([address]     Sepsis secondary to UTI ([address]      Plan:     1. Reviewed POC blood glucose . Labs and X ray results   2. Reviewed Current Medicines   3. On meal/ Correction bolus Humalog/ Basal Lantus Insulin regime / and   4. Monitor Blood glucose frequently   5. Modified  the dose of Insulin/ other medicines as needed   6. Waiting for insurance approval for transfer to skilled nursing unit  7. Will follow     .      Loyd Wassermna MD, MD

## 2022-03-24 NOTE — PROGRESS NOTES
Nephrology Progress Note  3/24/2022 7:47 AM        Subjective:   Admit Date: 3/16/2022  PCP: Toney Sharp MD    Interval History: Reported feeling really    Diet: Some    ROS: Leg pain on light touch, slight abdominal distention, fatigue and tiredness  Urine output recorded only 400 cc for the last shift  No fever      Data:     Current meds:    glipiZIDE  5 mg Oral BID AC    aspirin  81 mg Oral Daily    cephALEXin  500 mg Oral 4 times per day    atorvastatin  40 mg Oral Nightly    insulin lispro  0-6 Units SubCUTAneous TID WC    insulin glargine  10 Units SubCUTAneous Nightly    insulin lispro  0-6 Units SubCUTAneous Nightly    polyethylene glycol  17 g Oral Daily    sodium chloride flush  5-40 mL IntraVENous 2 times per day    heparin (porcine)  5,000 Units SubCUTAneous 3 times per day    amLODIPine  10 mg Oral Daily    allopurinol  100 mg Oral Daily    pregabalin  50 mg Oral BID      dextrose      sodium chloride 25 mL (03/21/22 0338)         I/O last 3 completed shifts: In: 130 [P.O.:120;  I.V.:10]  Out: 1050 [Urine:1050]    CBC:   Recent Labs     03/22/22  0823 03/23/22  0721   WBC 8.7 8.8   HGB 8.2* 8.6*    289          Recent Labs     03/22/22  0823 03/23/22  0721    143   K 4.4 5.2*    104   CO2 23 23   BUN 37* 37*   CREATININE 2.0* 2.2*   GLUCOSE 126* 104*       Lab Results   Component Value Date    CALCIUM 9.3 03/23/2022    PHOS 3.3 03/19/2022       Objective:     Vitals: BP (!) 142/80   Pulse 87   Temp 99.6 °F (37.6 °C) (Oral)   Resp 18   Ht 6' (1.829 m)   Wt 204 lb 8 oz (92.8 kg)   SpO2 95%   BMI 27.74 kg/m²     General appearance: Alert, awake and oriented with flat affect and head of the bed elevated 45 degrees  HEENT: Positive conjunctival pallor  Neck: Seems supple  Lungs: No gross crackles on auscultation  Heart: Seems regular rate and rhythm  Abdomen: Slightly distended, soft, positive bowel sounds  Extremities: 2+ lower extremity edema  Has a Hopper catheter      Problem List :         Impression :     1. Acute kidney injury with underlying CKD stage IV A3-nonoliguric but low urine output, creatinine slightly up with slightly elevated potassium level which might be a lab error or hemolysis-main etiology was obstruction and perhaps infection  2. Hypertension and leg edema-very well could be from calcium channel blocker-other potential etiology-pregabalin, which can cause leg edema  3. Underlying diabetes and multifactoral anemia    Recommendation/Plan  :     1. In the long run we can eliminate calcium channel blocker, and try different agent for leg edema, such as ACE inhibitors or ARB,  2. At thiazide now-long-acting  3. Low-salt, DASH diet and good glycemic control  4. Follow clinically  5.  From kidney standpoint can be discharged, he needs a CMP in 1 week along with magnesium and phosphorus, follow-up with me in 2 to 3 weeks      Ayan Guardado MD MD

## 2022-03-24 NOTE — CARE COORDINATION
Met with patient and son at bedside. Patient and son interested in patient returning back to ARU prior to returning home with son at discharge. Per patient he feels he can tolerate the 3 hours of therapy a day. Whiteboard placed to assess patients progress with therapy. ARU will continue to follow.

## 2022-03-24 NOTE — PROGRESS NOTES
Hospitalist Progress Note      PCP: Mj Pandey MD    Date of Admission: 3/16/2022    Chief Complaint on Admission: flank pain    Pt Seen/Examined and Chart Reviewed. Admitting dx sepsis    SUBJECTIVE:     Patient seen and examined  Report improvement of his body aches after restarting Lyrica. Still have low-grade fever. OBJECTIVE:   Vitals    TEMPERATURE:  Current - Temp: 98.6 °F (37 °C); Max - Temp  Av.4 °F (37.4 °C)  Min: 98.6 °F (37 °C)  Max: 100 °F (37.8 °C)  RESPIRATIONS RANGE: Resp  Av  Min: 14  Max: 18  PULSE RANGE: Pulse  Av.7  Min: 77  Max: 87  BLOOD PRESSURE RANGE:  Systolic (76ZNS), GYF:903 , Min:140 , EJO:136   ; Diastolic (07GWI), AL, Min:59, Max:80    PULSE OXIMETRY RANGE: SpO2  Av.5 %  Min: 94 %  Max: 95 %  24HR INTAKE/OUTPUT:      Intake/Output Summary (Last 24 hours) at 3/24/2022 1028  Last data filed at 3/24/2022 0800  Gross per 24 hour   Intake 70 ml   Output 1050 ml   Net -980 ml       Exam:    Gen: Not in distress. Alert. Chronically ill  Head: Normocephalic. Atraumatic. Eyes: Conjunctivae/corneas clear. ENT: Oral mucosa moist  Neck: No JVD. No obvious thyromegaly. CVS: Nml S1S2,  murmur  , RRR  Pulmomary: Clear bilaterally. No crackles. No wheezes. Gastrointestinal: Soft, non tender, non distend, . Musculoskeletal: Improvement of muscle tenderness   neuro: No focal deficit. Moves extremity spontaneously. Psychiatry: Appropriate affect. Not agitated. Data    Recent Labs     22  0823 22  0721   WBC 8.7 8.8   HGB 8.2* 8.6*   HCT 26.2* 28.4*    289      Recent Labs     22  0823 22  0721    143   K 4.4 5.2*    104   CO2 23 23   BUN 37* 37*   CREATININE 2.0* 2.2*     No results for input(s): AST, ALT, ALB, BILIDIR, BILITOT, ALKPHOS in the last 72 hours. No results for input(s): INR in the last 72 hours. No results for input(s): CKTOTAL, CKMB, CKMBINDEX, TROPONINI in the last 72 hours.     Imaging/Test Results  Laboratory data personally reviewed        Consults:     IP CONSULT TO HOSPITALIST  IP CONSULT TO UROLOGY  IP CONSULT TO ENDOCRINOLOGY  IP CONSULT TO DIETITIAN  IP CONSULT TO IV TEAM    Allergies  Patient has no known allergies. Medications      Scheduled Meds:   chlorthalidone  25 mg Oral Daily    amLODIPine  5 mg Oral Daily    glipiZIDE  5 mg Oral BID AC    aspirin  81 mg Oral Daily    cephALEXin  500 mg Oral 4 times per day    atorvastatin  40 mg Oral Nightly    insulin lispro  0-6 Units SubCUTAneous TID WC    insulin glargine  10 Units SubCUTAneous Nightly    insulin lispro  0-6 Units SubCUTAneous Nightly    polyethylene glycol  17 g Oral Daily    sodium chloride flush  5-40 mL IntraVENous 2 times per day    heparin (porcine)  5,000 Units SubCUTAneous 3 times per day    allopurinol  100 mg Oral Daily    pregabalin  50 mg Oral BID       Infusions:   dextrose      sodium chloride 25 mL (03/21/22 0338)       PRN Meds:  oxyCODONE, glucose, glucagon (rDNA), dextrose, dextrose bolus (hypoglycemia) **OR** dextrose bolus (hypoglycemia), HYDROmorphone, sodium chloride flush, sodium chloride, ondansetron **OR** ondansetron, polyethylene glycol, acetaminophen **OR** acetaminophen    ASSESSMENT AND PLAN      Active Hospital Problems    Diagnosis Date Noted    Sepsis secondary to UTI ([facility] [A41.9, N39.0] 03/17/2022         Aubree Almendarez is a 78 y.o.  male  who presents with Sepsis secondary to UTI ([facility]     Sepsis, Klebsiella, streptococcal viridans bacteremia Secondary to UTI   · Culture sensitivity noted, continue o cefadroxil  · Repeat blood culture obtained 3/23/2024, if remain negative patient can be discharged to [facility].     Left hydronephrosis:   · Status post cystoscopy, left ureteral stent exchange. 3/17/22  ·   Suprapubic catheter exchange.   · Repeat CT abdomen neg, left ureter stent in place, no hydro        Acute kidney injury on chronic kidney disease  Evaluated by nephrology,

## 2022-03-24 NOTE — PROGRESS NOTES
[address]  consulted by Dr. Gustavo Claros for monitoring and adjustment. Indication for treatment: Possible bacteremia  Goal trough: [] 10-15 mcg/mL or [x] 15-20 mcg/ml  AUC/USMAN: [] <500 or [x] 400-600    Pertinent Laboratory Values:   Temp Readings from Last 3 Encounters:   03/24/22 98.8 °F (37.1 °C)   01/06/22 99.3 °F (37.4 °C) (Oral)   11/28/21 98.1 °F (36.7 °C) (Oral)     Recent Labs     03/22/22  0823 03/23/22  0721 03/24/22  1105   WBC 8.7 8.8 8.4     Recent Labs     03/22/22  0823 03/23/22  0721 03/24/22  1105   BUN 37* 37* 38*   CREATININE 2.0* 2.2* 2.2*     Estimated Creatinine Clearance: 28 mL/min (A) (based on SCr of 2.2 mg/dL (H)). Intake/Output Summary (Last 24 hours) at 3/24/2022 1629  Last data filed at 3/24/2022 1327  Gross per 24 hour   Intake 550 ml   Output 1050 ml   Net -500 ml       Pertinent Cultures:  Date    Source    Results  3/23   Blood    Staph sp, ID pending    Vancomycin level:   TROUGH:  No results for input(s): VANCOTROUGH in the last 72 hours. RANDOM:  No results for input(s): VANCORANDOM in the last 72 hours.     Assessment:  · SCr, BUN, and urine output:   · PAMELLA on CKD IV  · Day(s) of therapy: 1  · Vancomycin concentration: to be collected    Plan:  · Vancomycin 2000 mg IVPB x1 loading dose  · Intermittent vancomycin dosing based on levels while in PAMELLA  · Plan to collect a level tomorrow AM and re-dose if appropriate  · Pharmacy will continue to monitor patient and adjust therapy as indicated    Nena 3 3/25 @ 0600    Thank you for the consult,  Jagjit Sher [facility], PharmD  3/24/2022 4:29 PM

## 2022-03-25 ENCOUNTER — APPOINTMENT (OUTPATIENT)
Dept: CT IMAGING | Age: 82
DRG: 659 | End: 2022-03-25
Payer: MEDICARE

## 2022-03-25 LAB
ANION GAP SERPL CALCULATED.3IONS-SCNC: 23 MMOL/L (ref 4–16)
BASOPHILS ABSOLUTE: 0.1 K/CU MM
BASOPHILS RELATIVE PERCENT: 0.7 % (ref 0–1)
BUN BLDV-MCNC: 39 MG/DL (ref 6–23)
CALCIUM SERPL-MCNC: 9 MG/DL (ref 8.3–10.6)
CHLORIDE BLD-SCNC: 99 MMOL/L (ref 99–110)
CO2: 18 MMOL/L (ref 21–32)
CREAT SERPL-MCNC: 2.5 MG/DL (ref 0.9–1.3)
DIFFERENTIAL TYPE: ABNORMAL
DOSE AMOUNT: NORMAL
DOSE TIME: NORMAL
EKG ATRIAL RATE: 141 BPM
EKG DIAGNOSIS: NORMAL
EKG Q-T INTERVAL: 184 MS
EKG QRS DURATION: 76 MS
EKG QTC CALCULATION (BAZETT): 280 MS
EKG R AXIS: 10 DEGREES
EKG T AXIS: 270 DEGREES
EKG VENTRICULAR RATE: 140 BPM
EOSINOPHILS ABSOLUTE: 0.5 K/CU MM
EOSINOPHILS RELATIVE PERCENT: 5.8 % (ref 0–3)
GFR AFRICAN AMERICAN: 30 ML/MIN/1.73M2
GFR NON-AFRICAN AMERICAN: 25 ML/MIN/1.73M2
GLUCOSE BLD-MCNC: 158 MG/DL (ref 70–99)
GLUCOSE BLD-MCNC: 184 MG/DL (ref 70–99)
GLUCOSE BLD-MCNC: 202 MG/DL (ref 70–99)
GLUCOSE BLD-MCNC: 204 MG/DL (ref 70–99)
GLUCOSE BLD-MCNC: 231 MG/DL (ref 70–99)
GLUCOSE BLD-MCNC: 265 MG/DL (ref 70–99)
GLUCOSE BLD-MCNC: 74 MG/DL (ref 70–99)
GLUCOSE BLD-MCNC: 95 MG/DL (ref 70–99)
HCT VFR BLD CALC: 29.4 % (ref 42–52)
HEMOGLOBIN: 8.9 GM/DL (ref 13.5–18)
IMMATURE NEUTROPHIL %: 0.6 % (ref 0–0.43)
LYMPHOCYTES ABSOLUTE: 2 K/CU MM
LYMPHOCYTES RELATIVE PERCENT: 22.9 % (ref 24–44)
MCH RBC QN AUTO: 29.5 PG (ref 27–31)
MCHC RBC AUTO-ENTMCNC: 30.3 % (ref 32–36)
MCV RBC AUTO: 97.4 FL (ref 78–100)
MONOCYTES ABSOLUTE: 0.9 K/CU MM
MONOCYTES RELATIVE PERCENT: 10 % (ref 0–4)
NUCLEATED RBC %: 0 %
PDW BLD-RTO: 15.3 % (ref 11.7–14.9)
PLATELET # BLD: 400 K/CU MM (ref 140–440)
PMV BLD AUTO: 11 FL (ref 7.5–11.1)
POTASSIUM SERPL-SCNC: 5.2 MMOL/L (ref 3.5–5.1)
RBC # BLD: 3.02 M/CU MM (ref 4.6–6.2)
SEGMENTED NEUTROPHILS ABSOLUTE COUNT: 5.3 K/CU MM
SEGMENTED NEUTROPHILS RELATIVE PERCENT: 60 % (ref 36–66)
SODIUM BLD-SCNC: 140 MMOL/L (ref 135–145)
TOTAL IMMATURE NEUTOROPHIL: 0.05 K/CU MM
TOTAL NUCLEATED RBC: 0 K/CU MM
TROPONIN T: 0.03 NG/ML
TSH HIGH SENSITIVITY: 6.19 UIU/ML (ref 0.27–4.2)
VANCOMYCIN RANDOM: 21.6 UG/ML
WBC # BLD: 8.9 K/CU MM (ref 4–10.5)

## 2022-03-25 PROCEDURE — 6370000000 HC RX 637 (ALT 250 FOR IP): Performed by: INTERNAL MEDICINE

## 2022-03-25 PROCEDURE — 97535 SELF CARE MNGMENT TRAINING: CPT

## 2022-03-25 PROCEDURE — 94761 N-INVAS EAR/PLS OXIMETRY MLT: CPT

## 2022-03-25 PROCEDURE — 2580000003 HC RX 258: Performed by: INTERNAL MEDICINE

## 2022-03-25 PROCEDURE — 87040 BLOOD CULTURE FOR BACTERIA: CPT

## 2022-03-25 PROCEDURE — 84484 ASSAY OF TROPONIN QUANT: CPT

## 2022-03-25 PROCEDURE — 6360000002 HC RX W HCPCS: Performed by: HOSPITALIST

## 2022-03-25 PROCEDURE — 84443 ASSAY THYROID STIM HORMONE: CPT

## 2022-03-25 PROCEDURE — 1200000000 HC SEMI PRIVATE

## 2022-03-25 PROCEDURE — 2580000003 HC RX 258: Performed by: HOSPITALIST

## 2022-03-25 PROCEDURE — 6370000000 HC RX 637 (ALT 250 FOR IP): Performed by: HOSPITALIST

## 2022-03-25 PROCEDURE — 80048 BASIC METABOLIC PNL TOTAL CA: CPT

## 2022-03-25 PROCEDURE — 93010 ELECTROCARDIOGRAM REPORT: CPT | Performed by: INTERNAL MEDICINE

## 2022-03-25 PROCEDURE — 80202 ASSAY OF VANCOMYCIN: CPT

## 2022-03-25 PROCEDURE — 97530 THERAPEUTIC ACTIVITIES: CPT

## 2022-03-25 PROCEDURE — 2500000003 HC RX 250 WO HCPCS: Performed by: FAMILY MEDICINE

## 2022-03-25 PROCEDURE — 36415 COLL VENOUS BLD VENIPUNCTURE: CPT

## 2022-03-25 PROCEDURE — 82962 GLUCOSE BLOOD TEST: CPT

## 2022-03-25 PROCEDURE — 99222 1ST HOSP IP/OBS MODERATE 55: CPT | Performed by: INTERNAL MEDICINE

## 2022-03-25 PROCEDURE — 93005 ELECTROCARDIOGRAM TRACING: CPT | Performed by: FAMILY MEDICINE

## 2022-03-25 PROCEDURE — 97110 THERAPEUTIC EXERCISES: CPT

## 2022-03-25 PROCEDURE — 70450 CT HEAD/BRAIN W/O DYE: CPT

## 2022-03-25 PROCEDURE — 2580000003 HC RX 258: Performed by: FAMILY MEDICINE

## 2022-03-25 PROCEDURE — 6360000002 HC RX W HCPCS: Performed by: INTERNAL MEDICINE

## 2022-03-25 PROCEDURE — 6370000000 HC RX 637 (ALT 250 FOR IP): Performed by: FAMILY MEDICINE

## 2022-03-25 PROCEDURE — 85025 COMPLETE CBC W/AUTO DIFF WBC: CPT

## 2022-03-25 RX ORDER — SODIUM CHLORIDE 9 MG/ML
500 INJECTION, SOLUTION INTRAVENOUS ONCE
Status: COMPLETED | OUTPATIENT
Start: 2022-03-25 | End: 2022-03-25

## 2022-03-25 RX ORDER — METOPROLOL SUCCINATE 25 MG/1
12.5 TABLET, EXTENDED RELEASE ORAL DAILY
Status: DISCONTINUED | OUTPATIENT
Start: 2022-03-25 | End: 2022-03-27

## 2022-03-25 RX ORDER — METOPROLOL TARTRATE 5 MG/5ML
5 INJECTION INTRAVENOUS ONCE
Status: COMPLETED | OUTPATIENT
Start: 2022-03-25 | End: 2022-03-25

## 2022-03-25 RX ORDER — MIRTAZAPINE 15 MG/1
15 TABLET, ORALLY DISINTEGRATING ORAL NIGHTLY
Status: DISCONTINUED | OUTPATIENT
Start: 2022-03-25 | End: 2022-03-30 | Stop reason: HOSPADM

## 2022-03-25 RX ORDER — GLIPIZIDE 5 MG/1
7.5 TABLET ORAL
Status: DISCONTINUED | OUTPATIENT
Start: 2022-03-25 | End: 2022-03-26

## 2022-03-25 RX ADMIN — INSULIN GLARGINE 10 UNITS: 100 INJECTION, SOLUTION SUBCUTANEOUS at 21:42

## 2022-03-25 RX ADMIN — ALLOPURINOL 100 MG: 100 TABLET ORAL at 09:36

## 2022-03-25 RX ADMIN — CEPHALEXIN 500 MG: 500 CAPSULE ORAL at 06:24

## 2022-03-25 RX ADMIN — METOPROLOL TARTRATE 5 MG: 1 INJECTION, SOLUTION INTRAVENOUS at 15:11

## 2022-03-25 RX ADMIN — HEPARIN SODIUM 5000 UNITS: 5000 INJECTION INTRAVENOUS; SUBCUTANEOUS at 14:36

## 2022-03-25 RX ADMIN — ATORVASTATIN CALCIUM 40 MG: 40 TABLET, FILM COATED ORAL at 21:42

## 2022-03-25 RX ADMIN — PREGABALIN 50 MG: 50 CAPSULE ORAL at 21:42

## 2022-03-25 RX ADMIN — GLIPIZIDE 7.5 MG: 5 TABLET ORAL at 15:49

## 2022-03-25 RX ADMIN — CEPHALEXIN 500 MG: 500 CAPSULE ORAL at 00:44

## 2022-03-25 RX ADMIN — POLYETHYLENE GLYCOL (3350) 17 G: 17 POWDER, FOR SOLUTION ORAL at 09:36

## 2022-03-25 RX ADMIN — MIRTAZAPINE 15 MG: 15 TABLET, ORALLY DISINTEGRATING ORAL at 21:42

## 2022-03-25 RX ADMIN — HEPARIN SODIUM 5000 UNITS: 5000 INJECTION INTRAVENOUS; SUBCUTANEOUS at 21:42

## 2022-03-25 RX ADMIN — SODIUM CHLORIDE, PRESERVATIVE FREE 10 ML: 5 INJECTION INTRAVENOUS at 21:43

## 2022-03-25 RX ADMIN — PREGABALIN 50 MG: 50 CAPSULE ORAL at 09:36

## 2022-03-25 RX ADMIN — CEPHALEXIN 500 MG: 500 CAPSULE ORAL at 12:12

## 2022-03-25 RX ADMIN — AMLODIPINE BESYLATE 5 MG: 10 TABLET ORAL at 09:36

## 2022-03-25 RX ADMIN — HEPARIN SODIUM 5000 UNITS: 5000 INJECTION INTRAVENOUS; SUBCUTANEOUS at 06:24

## 2022-03-25 RX ADMIN — METOPROLOL SUCCINATE 12.5 MG: 25 TABLET, EXTENDED RELEASE ORAL at 18:54

## 2022-03-25 RX ADMIN — VANCOMYCIN HYDROCHLORIDE 1000 MG: 1 INJECTION, POWDER, LYOPHILIZED, FOR SOLUTION INTRAVENOUS at 17:52

## 2022-03-25 RX ADMIN — SODIUM CHLORIDE, PRESERVATIVE FREE 10 ML: 5 INJECTION INTRAVENOUS at 09:36

## 2022-03-25 RX ADMIN — ASPIRIN 81 MG CHEWABLE TABLET 81 MG: 81 TABLET CHEWABLE at 09:36

## 2022-03-25 RX ADMIN — CHLORTHALIDONE 25 MG: 25 TABLET ORAL at 09:35

## 2022-03-25 RX ADMIN — SODIUM CHLORIDE 500 ML: 9 INJECTION, SOLUTION INTRAVENOUS at 15:32

## 2022-03-25 RX ADMIN — CEPHALEXIN 500 MG: 500 CAPSULE ORAL at 17:47

## 2022-03-25 ASSESSMENT — PAIN SCALES - GENERAL
PAINLEVEL_OUTOF10: 0

## 2022-03-25 ASSESSMENT — ENCOUNTER SYMPTOMS
GASTROINTESTINAL NEGATIVE: 1
EYES NEGATIVE: 1
RESPIRATORY NEGATIVE: 1
ALLERGIC/IMMUNOLOGIC NEGATIVE: 1

## 2022-03-25 NOTE — PROGRESS NOTES
Comprehensive Nutrition Assessment    Type and Reason for Visit:  Reassess    Nutrition Recommendations/Plan:  · Please obtain updated measured weight so that weight loss can be accurately assessed  · Assist pt with meals as needed and document all po intake  · Initiate appetite stimulant  · Recommend placing NG and initiating EN if pt unable to meet greater than 50% of estimated energy needs via po intake  · Will continue to monitor po intake, weight trends, nutrition status, poc    Nutrition Assessment:  Pt working with therapy at time of visit attempt, pt remains on 5 carb choice diet with frozen and protein fortified oral nutrition supplements ordered, pt continues to consume 0-50% of meals documented in the past 5 days per flowsheet, continued inadequate oral intake, perfectserve attending regarding nutrition plan and provided recommendation, per attending physician \"He would not do well with an NG and it is a bit premature for a PEG\", attending agreeable to initiate Remeron, will continue to follow at high nutrition risk    Malnutrition Assessment:  Malnutrition Status: At risk for malnutrition (Comment)    Context:  Acute Illness       Estimated Daily Nutrient Needs:  Energy (kcal):  2023 (25 kcal/kg IBW); Weight Used for Energy Requirements:  Ideal     Protein (g):   (1.1-1.3 g/kg IBW); Weight Used for Protein Requirements:  Ideal        Fluid (ml/day):  2000; Method Used for Fluid Requirements:  1 ml/kcal      Wounds:   (heel, groin, abdominal fold wounds noted)       Current Nutrition Therapies:    ADULT ORAL NUTRITION SUPPLEMENT; Lunch, Dinner; Frozen Oral Supplement  ADULT DIET;  Regular; 5 carb choices (75 gm/meal)  ADULT ORAL NUTRITION SUPPLEMENT; Breakfast; Fortified Pudding Oral Supplement    Anthropometric Measures:  · Height: 6' 0.01\" (182.9 cm)  · Current Body Weight: 204 lb 9.4 oz (92.8 kg) ((3/19))   · Admission Body Weight: 201 lb 14.4 oz (91.6 kg)    · Usual Body Weight: 204 lb 13 oz (92.9 kg) (11/18/21)     · Ideal Body Weight: 178 lbs; % Ideal Body Weight 114.9 %   · BMI: 27.7   · BMI Categories: Overweight (BMI 25.0-29. 9)       Nutrition Diagnosis:   · Inadequate oral intake related to pain as evidenced by intake 0-25%,intake 26-50%    Nutrition Interventions:   Food and/or Nutrient Delivery:  Continue Current Diet,Continue Oral Nutrition Supplement,Start Tube Feeding  Nutrition Education/Counseling:  No recommendation at this time   Coordination of Nutrition Care:  Continue to monitor while inpatient    Goals:  Pt will meet greater than 50% of estimated energy needs via po intake in the next 24 hr or EN will be initiated       Nutrition Monitoring and Evaluation:   Behavioral-Environmental Outcomes:  None Identified   Food/Nutrient Intake Outcomes:  Food and Nutrient Intake,Supplement Intake  Physical Signs/Symptoms Outcomes:  Biochemical Data,GI Status,Hemodynamic Status,Fluid Status or Edema,Weight,Skin     Discharge Planning:     Too soon to determine     Electronically signed by Ciara Bradford MS, RD, LD on 3/25/22 at 10:00 AM EDT    Contact: [phone]

## 2022-03-25 NOTE — PLAN OF CARE
Problem: Nutritional:  Goal: Ability to tolerate tube feedings without aspirating will improve  Description: Ability to tolerate tube feedings without aspirating will improve  3/25/2022 0921 by Radha Camacho RN  Outcome: Ongoing  3/24/2022 2312 by Mabel Delgado RN  Outcome: Ongoing  Goal: Consumption of food in small portions  Description: Consumption of food in small portions  3/25/2022 0921 by Radha Camacho RN  Outcome: Ongoing  3/24/2022 2312 by Mabel Delgado RN  Outcome: Ongoing  Goal: Consumption of liquid of appropriate consistency  Description: Consumption of liquid of appropriate consistency  3/25/2022 0921 by Radha Camacho RN  Outcome: Ongoing  3/24/2022 2312 by Mabel Delgado RN  Outcome: Ongoing     Problem: Respiratory:  Goal: Ability to maintain a clear airway will improve  Description: Ability to maintain a clear airway will improve  3/25/2022 0921 by Radha Camacho RN  Outcome: Ongoing  3/24/2022 2312 by Mabel Delgado RN  Outcome: Ongoing  Goal: Will remain free from infection  Description: Will remain free from infection  3/25/2022 0921 by Radha Camacho RN  Outcome: Ongoing  3/24/2022 2312 by Mabel Delgado RN  Outcome: Ongoing  Goal: Absence of aspiration  Description: Absence of aspiration  3/25/2022 0734 by Radha Camacho RN  Outcome: Ongoing  3/24/2022 2312 by Mabel Delgado RN  Outcome: Ongoing     Problem: Safety:  Goal: Ability to chew and swallow food without choking will improve  Description: Ability to chew and swallow food without choking will improve  3/25/2022 0921 by Radha Camacho RN  Outcome: Ongoing  3/24/2022 2312 by Mabel Delgado RN  Outcome: Ongoing  Goal: Ability to demonstrate good, daily oral hygiene techniques will improve  Description: Ability to demonstrate good, daily oral hygiene techniques will improve  3/25/2022 0921 by Radha Camacho RN  Outcome: Ongoing  3/24/2022 2312 by Mabel Delgado RN  Outcome: Ongoing  Goal: Maintenance of upright position during and after feeding  Description: Maintenance of upright position during and after feeding  3/25/2022 0921 by Akanksha Sommers RN  Outcome: Ongoing  3/24/2022 2312 by Lavelle Alexander RN  Outcome: Ongoing     Problem: Skin Integrity:  Goal: Will show no infection signs and symptoms  Description: Will show no infection signs and symptoms  3/25/2022 0921 by Akanksha Sommers RN  Outcome: Ongoing  3/24/2022 2312 by Lavelle Alexander RN  Outcome: Ongoing  Goal: Absence of new skin breakdown  Description: Absence of new skin breakdown  3/25/2022 0921 by Akanksha Sommers RN  Outcome: Ongoing  3/24/2022 2312 by Lavelle Alexander RN  Outcome: Ongoing     Problem: Falls - Risk of:  Goal: Will remain free from falls  Description: Will remain free from falls  3/25/2022 0921 by Akanksha Sommers RN  Outcome: Ongoing  3/24/2022 2312 by Lavelle Alexander RN  Outcome: Ongoing  Goal: Absence of physical injury  Description: Absence of physical injury  3/25/2022 6298 by Akanksha Sommers RN  Outcome: Ongoing  3/24/2022 2312 by Lavelle Alexander RN  Outcome: Ongoing     Problem: Pain:  Goal: Pain level will decrease  Description: Pain level will decrease  3/25/2022 0921 by Akanksha Sommers RN  Outcome: Ongoing  3/24/2022 2312 by Lavelle Alexander RN  Outcome: Ongoing  Goal: Control of acute pain  Description: Control of acute pain  3/25/2022 0921 by Akanksha Sommers RN  Outcome: Ongoing  3/24/2022 2312 by Lavelle Alexander RN  Outcome: Ongoing  Goal: Control of chronic pain  Description: Control of chronic pain  3/25/2022 0921 by Akanksha Sommers RN  Outcome: Ongoing  3/24/2022 2312 by Lavelle Alexander RN  Outcome: Ongoing     Problem: Nutrition  Goal: Optimal nutrition therapy  3/25/2022 0921 by Akanksha Sommers RN  Outcome: Ongoing  3/24/2022 2312 by Lavelle Alexander RN  Outcome: Ongoing

## 2022-03-25 NOTE — PROGRESS NOTES
Nephrology Progress Note  3/25/2022 8:27 AM        Subjective:   Admit Date: 3/16/2022  PCP: Alice Arita MD    Interval History: Reported feeling elevated today    Diet: Eating little bit better    ROS: No shortness of breath or confusion  Low-grade fever. T-max 100.6, acceptable blood pressure  Urine output recorded 1.75 L for the last 24 hours    Data:     Current meds:    glipiZIDE  7.5 mg Oral BID AC    insulin lispro  0-12 Units SubCUTAneous TID WC    insulin lispro  0-6 Units SubCUTAneous Nightly    chlorthalidone  25 mg Oral Daily    amLODIPine  5 mg Oral Daily    vancomycin (VANCOCIN) intermittent dosing (placeholder)   Other RX Placeholder    aspirin  81 mg Oral Daily    cephALEXin  500 mg Oral 4 times per day    atorvastatin  40 mg Oral Nightly    insulin glargine  10 Units SubCUTAneous Nightly    polyethylene glycol  17 g Oral Daily    sodium chloride flush  5-40 mL IntraVENous 2 times per day    heparin (porcine)  5,000 Units SubCUTAneous 3 times per day    allopurinol  100 mg Oral Daily    pregabalin  50 mg Oral BID      dextrose      sodium chloride 25 mL (03/24/22 3437)         I/O last 3 completed shifts:   In: 590 [P.O.:580; I.V.:10]  Out: 1750 [Urine:1750]    CBC:   Recent Labs     03/23/22  0721 03/24/22  1105   WBC 8.8 8.4   HGB 8.6* 8.1*    362          Recent Labs     03/23/22  0721 03/24/22  1105    142   K 5.2* 4.7    105   CO2 23 23   BUN 37* 38*   CREATININE 2.2* 2.2*   GLUCOSE 104* 231*       Lab Results   Component Value Date    CALCIUM 9.2 03/24/2022    PHOS 3.3 03/19/2022       Objective:     Vitals: /65   Pulse 80   Temp 100.4 °F (38 °C) (Oral)   Resp 18   Ht 6' (1.829 m)   Wt 204 lb 8 oz (92.8 kg)   SpO2 97%   BMI 27.74 kg/m²     General appearance: Alert, awake and oriented  HEENT: Less conjunctival pallor  Neck: Supple  Lungs: No gross crackles on auscultation  Heart: Regular rate and rhythm  Abdomen: Soft, nontender  Extremities: Left leg edema  He has a Hopper catheter      Problem List :         Impression :     1. Acute kidney injury with underlying CKD stage IV A3-stable so far-but with fever, ongoing infection antibiotic, has risk for recurrent injury-has had obstructive uropathy  2. Still has low-grade fever, 2 out of 4 positive for staph species blood culture, also previously Klebsiella pneumonia growing blood culture  3. Underlying hypertension diabetes and multifactoral anemia    Recommendation/Plan  :     1. Follow clinically and biochemically  2. Watch for iatrogenic nosocomial complication  3. As some of the blood culture could be skin contamination, will wait for further data, and his clinical course   4. manual blood pressure, when possible  5.        Ashley Rasheed MD MD

## 2022-03-25 NOTE — PLAN OF CARE
Problem: Nutritional:  Goal: Ability to tolerate tube feedings without aspirating will improve  Description: Ability to tolerate tube feedings without aspirating will improve  Outcome: Ongoing  Goal: Consumption of food in small portions  Description: Consumption of food in small portions  Outcome: Ongoing  Goal: Consumption of liquid of appropriate consistency  Description: Consumption of liquid of appropriate consistency  Outcome: Ongoing     Problem: Respiratory:  Goal: Ability to maintain a clear airway will improve  Description: Ability to maintain a clear airway will improve  Outcome: Ongoing  Goal: Will remain free from infection  Description: Will remain free from infection  Outcome: Ongoing  Goal: Absence of aspiration  Description: Absence of aspiration  Outcome: Ongoing     Problem: Safety:  Goal: Ability to chew and swallow food without choking will improve  Description: Ability to chew and swallow food without choking will improve  Outcome: Ongoing  Goal: Ability to demonstrate good, daily oral hygiene techniques will improve  Description: Ability to demonstrate good, daily oral hygiene techniques will improve  Outcome: Ongoing  Goal: Maintenance of upright position during and after feeding  Description: Maintenance of upright position during and after feeding  Outcome: Ongoing     Problem: Skin Integrity:  Goal: Will show no infection signs and symptoms  Description: Will show no infection signs and symptoms  Outcome: Ongoing  Goal: Absence of new skin breakdown  Description: Absence of new skin breakdown  Outcome: Ongoing     Problem: Falls - Risk of:  Goal: Will remain free from falls  Description: Will remain free from falls  Outcome: Ongoing  Goal: Absence of physical injury  Description: Absence of physical injury  Outcome: Ongoing     Problem: Pain:  Goal: Pain level will decrease  Description: Pain level will decrease  Outcome: Ongoing  Goal: Control of acute pain  Description: Control of

## 2022-03-25 NOTE — PROGRESS NOTES
[address] [facility]  consulted by Dr. Bridget Camacho for monitoring and adjustment. Indication for treatment: Possible bacteremia  Goal trough: [] 10-15 mcg/mL or [x] 15-20 mcg/ml  AUC/USMAN: [] <500 or [x] 400-600    Pertinent Laboratory Values:   Temp Readings from Last 3 Encounters:   03/25/22 98.7 °F (37.1 °C) (Oral)   01/06/22 99.3 °F (37.4 °C) (Oral)   11/28/21 98.1 °F (36.7 °C) (Oral)     Recent Labs     03/23/22  0721 03/24/22  1105 03/25/22  1027   WBC 8.8 8.4 8.9     Recent Labs     03/23/22  0721 03/24/22  1105 03/25/22  1027   BUN 37* 38* 39*   CREATININE 2.2* 2.2* 2.5*     Estimated Creatinine Clearance: 25 mL/min (A) (based on SCr of 2.5 mg/dL (H)). Intake/Output Summary (Last 24 hours) at 3/25/2022 1518  Last data filed at 3/25/2022 0618  Gross per 24 hour   Intake 100 ml   Output 1100 ml   Net -1000 ml       Pertinent Cultures:  Date    Source    Results  3/23   Blood    Staph coag neg (2/4)    Vancomycin level:   TROUGH:  No results for input(s): VANCOTROUGH in the last 72 hours.   RANDOM:    Recent Labs     03/25/22  1027   VANCORANDOM 21.6       Assessment:  · SCr, BUN, and urine output:   · PAMELLA on CKD IV  · Day(s) of therapy: 2  · Vancomycin concentration: to be collected    Plan:  · Vancomycin 1000mg x 1   · Intermittent vancomycin dosing based on levels while in PAMELLA  · Pharmacy will continue to monitor patient and adjust therapy as indicated    VANCOMYCIN CONCENTRATION SCHEDULED FOR 3/26 @ 0600    Thank you for the consult,  Radha Chakraborty, [address]  3/25/2022 3:18 PM

## 2022-03-25 NOTE — PROGRESS NOTES
V2.0  [facility] Hospitalist Progress Note      Name:  Netta Colbert /Age/Sex: 1940  (78 y.o. male)   MRN & CSN:  [id number] & [id number] Encounter Date/Time: 3/25/2022 10:59 AM EDT    Location:  [address] [place] [address] PCP: Donna Shaw MD       Hospital Day: 10    Assessment and Plan:   Netta Colbert is a 78 y.o. male with pmh of diabetes, hyperlipidemia, UTI who presents with Sepsis secondary to UTI ([facility] [address]      Plan:  1. Sepsis due to Klebsiella bacteremia/UTI: Urine cultures showed less than 50,000 white blood culture 3/20/2022 + for Klebsiella pneumoniae. Patient had subsequent blood culture 3/23/2022 that showed staph was 2/4 with suspected contamination. Repeating blood cultures. Continue Keflex for now. Continues to have low-grade intermittent fevers will obtain additional work-up including infectious disease consult if persists or blood cultures positive. Suspect 2-week course should be sufficient. 2. Coag Neg Staph Bacteremia: 2/4 3/23. Possible contaminant but repeating. Will DC Vancomycin started 3/24 if repeat blood cx with NG as suspected. 3. Left hydronephrosis: S/p cystoscopy with left ureteral stent exchange 3/17/2022, suprapubic catheter exchange. Repeat CT A/P 3/20 showed well-positioned left ureteric stent with no evidence of hydronephrosis or renal abscess. 4. PAMELLA: On CKD stage IV: Creatinine stable around 2.2. Nephrology following avoid nephrotoxic medication. 5. IDDM 2: Had hyperglycemia blood glucose trending down on the low side 3/25/2022. Endocrinology following will titrate insulin accordingly. Hypoglycemic protocol. 6. Acute metabolic encephalopathy: Mental status waxing and waning. CT head without acute findings. Continue ASA and statins patient does have a history of CVA. Maintain sleep-wake cycle avoid delirium causing medications. 7. Essential hypertension: BP overall stable. Continue current regimen will titrate as needed. 8. Severe PCM: poor oral intake. Discussed with dietary and starting Remeron. Continue supplementation. Diet ADULT ORAL NUTRITION SUPPLEMENT; Lunch, Dinner; Frozen Oral Supplement  ADULT DIET; Regular; 5 carb choices (75 gm/meal)  ADULT ORAL NUTRITION SUPPLEMENT; Breakfast; Fortified Pudding Oral Supplement   DVT Prophylaxis [] Lovenox, [x]  Heparin, [] SCDs, [] Ambulation,  [] Eliquis, [] Xarelto  [] Coumadin   Code Status Full Code   Disposition From: home  Expected Disposition: ARU  Estimated Date of Discharge: 1-2 days, still having low grade fevers intermittently and had probably contaminated blood cx needing repeat  Patient requires continued admission due to bacteremia, fever   Surrogate Decision Maker/ POA Son mauro     Subjective:     Chief Complaint: Altered Mental Status (decline in mental status today)       Juvenal Taylor is a 78 y.o. male who presents with UTI. Low grade fevers intermittently. Sitting in chair eating. No complaints. On IV Vanc and keflex         Review of Systems:    Review of Systems   Constitutional: Positive for fever. HENT: Negative. Eyes: Negative. Respiratory: Negative. Cardiovascular: Negative. Gastrointestinal: Negative. Endocrine: Negative. Genitourinary: Negative. Musculoskeletal: Negative. Skin: Negative. Allergic/Immunologic: Negative. Neurological: Negative. Hematological: Negative. Psychiatric/Behavioral: Negative. Objective: Intake/Output Summary (Last 24 hours) at 3/25/2022 1059  Last data filed at 3/25/2022 0618  Gross per 24 hour   Intake 340 ml   Output 1100 ml   Net -760 ml        Vitals:   Vitals:    03/25/22 0844   BP: (!) 142/70   Pulse: 80   Resp: 15   Temp: 98.7 °F (37.1 °C)   SpO2: 96%       Physical Exam:     General: NAD  Eyes: EOMI  ENT: neck supple  Cardiovascular: Regular rate.  No murmurs  Respiratory: Clear to auscultation on RA  Gastrointestinal: Soft, non tender  Genitourinary: no suprapubic tenderness  Musculoskeletal: No edema  Skin: warm, dry  Neuro: Alert. Generalized weakness  Psych: Mood appropriate.      Medications:   Medications:    glipiZIDE  7.5 mg Oral BID AC    insulin lispro  0-12 Units SubCUTAneous TID WC    insulin lispro  0-6 Units SubCUTAneous Nightly    mirtazapine  15 mg Oral Nightly    chlorthalidone  25 mg Oral Daily    amLODIPine  5 mg Oral Daily    vancomycin (VANCOCIN) intermittent dosing (placeholder)   Other RX Placeholder    aspirin  81 mg Oral Daily    cephALEXin  500 mg Oral 4 times per day    atorvastatin  40 mg Oral Nightly    insulin glargine  10 Units SubCUTAneous Nightly    polyethylene glycol  17 g Oral Daily    sodium chloride flush  5-40 mL IntraVENous 2 times per day    heparin (porcine)  5,000 Units SubCUTAneous 3 times per day    allopurinol  100 mg Oral Daily    pregabalin  50 mg Oral BID      Infusions:    dextrose      sodium chloride 25 mL (03/24/22 1737)     PRN Meds: oxyCODONE, 5 mg, Q4H PRN  glucose, 15 g, PRN  glucagon (rDNA), 1 mg, PRN  dextrose, 100 mL/hr, PRN  dextrose bolus (hypoglycemia), 125 mL, PRN   Or  dextrose bolus (hypoglycemia), 250 mL, PRN  HYDROmorphone, 0.5 mg, Q4H PRN  sodium chloride flush, 5-40 mL, PRN  sodium chloride, 25 mL, PRN  ondansetron, 4 mg, Q8H PRN   Or  ondansetron, 4 mg, Q6H PRN  polyethylene glycol, 17 g, Daily PRN  acetaminophen, 650 mg, Q6H PRN   Or  acetaminophen, 650 mg, Q6H PRN        Labs      Recent Results (from the past 24 hour(s))   Basic Metabolic Panel    Collection Time: 03/24/22 11:05 AM   Result Value Ref Range    Sodium 142 135 - 145 MMOL/L    Potassium 4.7 3.5 - 5.1 MMOL/L    Chloride 105 99 - 110 mMol/L    CO2 23 21 - 32 MMOL/L    Anion Gap 14 4 - 16    BUN 38 (H) 6 - 23 MG/DL    CREATININE 2.2 (H) 0.9 - 1.3 MG/DL    Glucose 231 (H) 70 - 99 MG/DL    Calcium 9.2 8.3 - 10.6 MG/DL    GFR Non- 29 (L) >60 mL/min/1.73m2    GFR  35 (L) >60 mL/min/1.73m2   CBC with Auto Differential    Collection Time: 03/24/22 11:05 AM   Result Value Ref Range    WBC 8.4 4.0 - 10.5 K/CU MM    RBC 2.73 (L) 4.6 - 6.2 M/CU MM    Hemoglobin 8.1 (L) 13.5 - 18.0 GM/DL    Hematocrit 26.2 (L) 42 - 52 %    MCV 96.0 78 - 100 FL    MCH 29.7 27 - 31 PG    MCHC 30.9 (L) 32.0 - 36.0 %    RDW 15.3 (H) 11.7 - 14.9 %    Platelets 588 420 - 273 K/CU MM    MPV 11.1 7.5 - 11.1 FL    Differential Type AUTOMATED DIFFERENTIAL     Segs Relative 70.5 (H) 36 - 66 %    Lymphocytes % 11.1 (L) 24 - 44 %    Monocytes % 10.2 (H) 0 - 4 %    Eosinophils % 7.1 (H) 0 - 3 %    Basophils % 0.4 0 - 1 %    Segs Absolute 6.0 K/CU MM    Lymphocytes Absolute 0.9 K/CU MM    Monocytes Absolute 0.9 K/CU MM    Eosinophils Absolute 0.6 K/CU MM    Basophils Absolute 0.0 K/CU MM    Nucleated RBC % 0.0 %    Total Nucleated RBC 0.0 K/CU MM    Total Immature Neutrophil 0.06 K/CU MM    Immature Neutrophil % 0.7 (H) 0 - 0.43 %   POCT Glucose    Collection Time: 03/24/22  4:35 PM   Result Value Ref Range    POC Glucose 235 (H) 70 - 99 MG/DL   POCT Glucose    Collection Time: 03/24/22  9:36 PM   Result Value Ref Range    POC Glucose 200 (H) 70 - 99 MG/DL   POCT Glucose    Collection Time: 03/25/22  6:18 AM   Result Value Ref Range    POC Glucose 74 70 - 99 MG/DL   POCT Glucose    Collection Time: 03/25/22  7:02 AM   Result Value Ref Range    POC Glucose 95 70 - 99 MG/DL        Imaging/Diagnostics Last 24 Hours   VL DUP LOWER EXTREMITY VENOUS BILATERAL    Result Date: 3/24/2022  EXAMINATION: DUPLEX VENOUS ULTRASOUND OF THE BILATERAL LOWER EXTREMITIES3/23/2022 8:55 pm TECHNIQUE: Duplex ultrasound using B-mode/gray scaled imaging, Doppler spectral analysis and color flow Doppler was obtained of the deep venous structures of the lower bilateral extremities. COMPARISON: None.  HISTORY: ORDERING SYSTEM PROVIDED HISTORY: Bilateral lower extremity pain, swelling, rule out DVT TECHNOLOGIST PROVIDED HISTORY: Reason for exam:->Bilateral lower extremity pain, swelling, rule out DVT Reason for Exam: Pain and swelling FINDINGS: The visualized veins of the bilateral lower extremities are patent and free of echogenic thrombus. The veins demonstrate good compressibility with normal color flow study and spectral analysis. Of note, the right popliteal vein could not be compressed due to patient movement. No evidence of DVT in either lower extremity.        Electronically signed by Shaw Givens MD on 3/25/2022 at 10:59 AM

## 2022-03-25 NOTE — PROGRESS NOTES
Occupational Therapy  . Occupational Therapy Treatment Note      Name: Smita Webb MRN: [id number] :   1940   Date:  3/25/2022   Admission Date: 3/16/2022 Room:  [address] [place] [address]-A     Primary Problem:      Restrictions/Precautions:  Restrictions/Precautions  Restrictions/Precautions: General Precautions,Fall Risk     Communication with other providers:  cotx with PTA Christina Ramsey for safety and assist. Updated nurse ritu on patients participation. Subjective:  Patient states: Oh yeah I forgot I had neck surgery  Pain: no numerical rating. Back/neck. C/o stiffness as well. (location, type, intensity)    Objective:    Observation:  Patient in high fowlers. Breakfast untouched in front of patient. patient asleep needing max cues to keep eyes open. Objective Measures:  tele    Treatment, including education:    Placed bed into chair position initially to arouse patient. Performed balloon toss with patient and patient threw and caught balloon bilaterally. ADL activity training was instructed today. Cues were given for safety, sequence, UE/LE placement, visual cues, and balance. Activities performed today included  hygiene  Feeding- SET UP  Facial hygiene- DEP- to arouse patient. Therapeutic activity training was instructed today. Cues were given for safety, sequence, UE/LE placement, awareness, and balance. Activities performed today included bed mobility training, sup-sit, sit-stand, SPT. High fowlers to EOB- Mod A With increased time and effort. Scooting hips forward- CGA for safety plus With increased time and effort. EOB sitting balance fair +. Cues for erect posture and to keep head up. Tolerated x20 min and performed BLE ex. Stand to FWW- x3 trials- each stand Min x2 but standing balance Mod x2. patient tends to have lateral lean to the L. Cues for hand placement and safety. Cues to have erect posture and to use BUEs to push upper body erect.   Patient attemtped to demo marching in place but unable to follow through with full steps. Sit to EOB- Min x2 for safe and slow descent  SPT- Max x2    BLE AROM exercises to include ankle pumps, ankle circles, marches,straight leg raises x10 reps each. Cues for self-pacing c rest breaks PRN    Patient educated on role of OT , benefits of OT and rationale for therapeutic intervention. All therapeutic intervention performed c emphasis on dynamic balance / standing tolerance to increase strength, endurance and activity tolerance for increased Walton c ADL tasks and func transfers / mobility. Patient left safely in bedside chair at end of session, with call light in reach, alarm on and nursing aware. Gait belt was used for func transfers / mobility.           Assessment / Impression:    Patient's tolerance of treatment: good once aroused  Adverse Reaction: none  Significant change in status and impact:  none  Barriers to improvement: weakness, decreased processing skills      Plan for Next Session:    Continue with OT POC      Time in:  830  Time out:  938  Timed treatment minutes:  68  Total treatment time:  68      Electronically signed by:    KHARI Lima COTA/L [id number]    3/25/2022, 9:37 AM

## 2022-03-25 NOTE — PLAN OF CARE
Nutrition Problem #1: Inadequate oral intake  Intervention: Food and/or Nutrient Delivery: Continue Current Diet,Continue Oral Nutrition Supplement,Start Tube Feeding  Nutritional Goals: Pt will meet greater than 50% of estimated energy needs via po intake in the next 24 hr or EN will be initiated

## 2022-03-25 NOTE — PROGRESS NOTES
Progress Note( Dr. Nava Emmanuel)  3/25/2022  Subjective:   Admit Date: 3/16/2022  PCP: Eim Dillon MD    Admitted For :Sepsis from UTI generalized  weakness    Consulted For: Better control of blood glucose    Interval History: feels better  Still feels weak     Denies any chest pains,   Denies SOB . Denies nausea or vomiting. No new bowel or bladder symptoms. Intake/Output Summary (Last 24 hours) at 3/25/2022 1842  Last data filed at 3/25/2022 0618  Gross per 24 hour   Intake 100 ml   Output 1100 ml   Net -1000 ml       DATA    CBC:   Recent Labs     03/23/22  0721 03/24/22  1105 03/25/22  1027   WBC 8.8 8.4 8.9   HGB 8.6* 8.1* 8.9*    362 400    CMP:  Recent Labs     03/23/22  0721 03/24/22  1105 03/25/22  1027    142 140   K 5.2* 4.7 5.2*    105 99   CO2 23 23 18*   BUN 37* 38* 39*   CREATININE 2.2* 2.2* 2.5*   CALCIUM 9.3 9.2 9.0     Lipids: No results found for: CHOL, HDL, TRIG  Glucose:  Recent Labs     03/25/22  1144 03/25/22  1539 03/25/22  1735   POCGLU 231* 202* 184*     [id number]:  Lab Results   Component Value Date    LABA1C 9.2 03/22/2022     High Sensitivity TSH:   Lab Results   Component Value Date    TSHHS 6.190 03/25/2022     Free T3: No results found for: FT3  Free T4:No results found for: T4FREE    CT HEAD WO CONTRAST   Final Result   1. No acute intracranial abnormality. 2. Stable moderate chronic white matter microvascular ischemic changes and   chronic lacunar infarct in the right basal ganglia. VL DUP LOWER EXTREMITY VENOUS BILATERAL   Final Result   No evidence of DVT in either lower extremity. XR HIP 2-3 VW W PELVIS LEFT   Final Result   No acute abnormality or arthropathy of the hip. CT ABDOMEN PELVIS WO CONTRAST Additional Contrast? None   Final Result   1. Well-positioned left ureteric stent with no evidence of hydronephrosis,   renal abscess or other acute abnormality. Normal right kidney.    2. Minimal atelectatic changes in both lower lobes. 3. Well-positioned suprapubic catheter in the urinary bladder. FL LESS THAN 1 HOUR   Final Result      CT ABDOMEN PELVIS WO CONTRAST Additional Contrast? None   Final Result   1. There is left-sided hydronephrosis and hydroureter despite the presence   of the left ureteral stent. Increased left perinephric stranding and fluid   compared to the right side. Will need to correlate for functionality of the   stent versus ascending urinary tract infection. 2.  The urinary bladder is collapsed around the suprapubic catheter. The   distal coil of the ureteral stent courses into the urinary bladder along the   inferior aspect or an expanded upper portion of the urethral junction. Previous prostate surgery is again noted. 3.  Constipation in the ascending and transverse colon. No small bowel   obstruction or pneumoperitoneum. 4.  Mild thickening of the distal esophagus may relate to reflux esophagitis. CT HEAD WO CONTRAST   Final Result   No acute intracranial abnormality. MRI may be obtained if clinically   indicated. XR CHEST PORTABLE   Final Result   1. Worsening pulmonary edema.               Scheduled Medicines   Medications:    glipiZIDE  7.5 mg Oral BID AC    insulin lispro  0-12 Units SubCUTAneous TID WC    insulin lispro  0-6 Units SubCUTAneous Nightly    mirtazapine  15 mg Oral Nightly    vancomycin  1,000 mg IntraVENous Once    metoprolol succinate  12.5 mg Oral Daily    [Held by provider] chlorthalidone  25 mg Oral Daily    vancomycin (VANCOCIN) intermittent dosing (placeholder)   Other RX Placeholder    aspirin  81 mg Oral Daily    cephALEXin  500 mg Oral 4 times per day    atorvastatin  40 mg Oral Nightly    insulin glargine  10 Units SubCUTAneous Nightly    polyethylene glycol  17 g Oral Daily    sodium chloride flush  5-40 mL IntraVENous 2 times per day    heparin (porcine)  5,000 Units SubCUTAneous 3 times per day    Humalog/ Basal Lantus Insulin regime / and   4. Monitor Blood glucose frequently   5. Modified  the dose of Insulin/ other medicines as needed   6. Waiting for insurance approval for transfer to skilled nursing unit  7. Will follow     .      Arthur Aguila MD, MD

## 2022-03-25 NOTE — CONSULTS
INPATIENT CARDIOLOGY CONSULT NOTE         Reason for consultation:  ? Tachycardia     Referring physician:  Sheryle Shone, MD     Primary care physician: Chris Juarez MD      Dear Sheryle Shone, MD Thank you for the consult    Chief Complaint   Patient presents with    Altered Mental Status     decline in mental status today       History of present illness:Greg is a 78 y. o.year old who  presents with   Chief Complaint   Patient presents with    Altered Mental Status     decline in mental status today       Patient is a pleasant 78-year-old gentleman with prior medical history significant for hyperlipidemia hypertension diabetes mellitus, anemia, history of prostate cancer, is admitted to the hospital with chief complaint of change in mental status  Patient is currently being treated for urinary tract infection and sepsis due to Klebsiella bacteremia. Cardiology consulted to evaluate patient for possible A. 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 Past surgical history:   has a past surgical history that includes Prostate surgery; other surgical history (3/28/13); Colon surgery; Bladder surgery; Prostatectomy (1996); and Bladder surgery (Left, 3/17/2022). Social History:   reports that he quit smoking about 45 years ago. He smoked 0.50 packs per day. He has never used smokeless tobacco. He reports that he does not drink alcohol and does not use drugs.   Family history:   no family history of CAD, STROKE of DM    No Known Allergies    glipiZIDE (GLUCOTROL) tablet 7.5 mg, BID AC  insulin lispro (HUMALOG) injection vial 0-12 Units, TID WC  insulin lispro (HUMALOG) injection vial 0-6 Units, Nightly  mirtazapine (REMERON SOL-TAB) disintegrating tablet 15 mg, Nightly  vancomycin 1000 mg IVPB in 250 mL D5W addavial, Once  [Held by provider] chlorthalidone (HYGROTON) tablet 25 mg, Daily  amLODIPine (NORVASC) tablet 5 mg, Daily  vancomycin (VANCOCIN) intermittent dosing (placeholder), RX Placeholder  aspirin chewable tablet 81 mg, Daily  oxyCODONE (ROXICODONE) immediate release tablet 5 mg, Q4H PRN  cephALEXin (KEFLEX) capsule 500 mg, 4 times per day  atorvastatin (LIPITOR) tablet 40 mg, Nightly  glucose (GLUTOSE) 40 % oral gel 15 g, PRN  glucagon (rDNA) injection 1 mg, PRN  dextrose 5 % solution, PRN  dextrose bolus (hypoglycemia) 10% 125 mL, PRN   Or  dextrose bolus (hypoglycemia) 10% 250 mL, PRN  insulin glargine (LANTUS) injection vial 10 Units, Nightly  HYDROmorphone (DILAUDID) injection 0.5 mg, Q4H PRN  polyethylene glycol (GLYCOLAX) packet 17 g, Daily  sodium chloride flush 0.9 % injection 5-40 mL, 2 times per day  sodium chloride flush 0.9 % injection 5-40 mL, PRN  0.9 % sodium chloride infusion, PRN  ondansetron (ZOFRAN-ODT) disintegrating tablet 4 mg, Q8H PRN   Or  ondansetron (ZOFRAN) injection 4 mg, Q6H PRN  polyethylene glycol (GLYCOLAX) packet 17 g, Daily PRN  acetaminophen (TYLENOL) tablet 650 mg, Q6H PRN   Or  acetaminophen (TYLENOL) suppository 650 mg, Q6H PRN  heparin (porcine) injection 5,000 Units, 3 times per day  allopurinol (ZYLOPRIM) tablet 100 mg, Daily  pregabalin (LYRICA) capsule 50 mg, BID      Current Facility-Administered Medications   Medication Dose Route Frequency Provider Last Rate Last Admin    glipiZIDE (GLUCOTROL) tablet 7.5 mg  7.5 mg Oral BID AC Lita Hawthorne MD   7.5 mg at 03/25/22 1549    insulin lispro (HUMALOG) injection vial 0-12 Units  0-12 Units SubCUTAneous TID  Lita Hawthorne MD   2 Units at 03/25/22 8515    insulin lispro (HUMALOG) injection vial 0-6 Units  0-6 Units SubCUTAneous Nightly M Ravin Luis MD        mirtazapine (REMERON SOL-TAB) disintegrating tablet 15 mg  15 mg Oral Nightly Kimberly Hess MD        vancomycin 1000 mg IVPB in 250 mL D5W addavial  1,000 mg IntraVENous Once Tita Conroy  mL/hr at 03/25/22 1752 1,000 mg at 03/25/22 1752    [Held by provider] chlorthalidone (HYGROTON) tablet 25 mg  25 mg Oral Daily Bradley Crain MD   25 mg at 03/25/22 0935    amLODIPine (NORVASC) tablet 5 mg  5 mg Oral Daily Bradley Crain MD   5 mg at 03/25/22 3169    vancomycin (VANCOCIN) intermittent dosing (placeholder)   Other RX Placeholder Tita Conroy MD        aspirin chewable tablet 81 mg  81 mg Oral Daily Tita Conroy MD   81 mg at 03/25/22 0936    oxyCODONE (ROXICODONE) immediate release tablet 5 mg  5 mg Oral Q4H PRN Tita Conroy MD   5 mg at 03/24/22 0533    cephALEXin (KEFLEX) capsule 500 mg  500 mg Oral 4 times per day Tita Conroy MD   500 mg at 03/25/22 1747    atorvastatin (LIPITOR) tablet 40 mg  40 mg Oral Nightly Thierno Clark MD   40 mg at 03/24/22 2206    glucose (GLUTOSE) 40 % oral gel 15 g  15 g Oral PRN Thierno Clark MD        glucagon (rDNA) injection 1 mg  1 mg IntraMUSCular PRN Thierno Clark MD        dextrose 5 % solution  100 mL/hr IntraVENous PRN Thierno Clark MD        dextrose bolus (hypoglycemia) 10% 125 mL  125 mL IntraVENous PRN Thierno Clark MD        Or    dextrose bolus (hypoglycemia) 10% 250 mL  250 mL IntraVENous PRN Thierno Clark MD        insulin glargine (LANTUS) injection vial 10 Units  10 Units SubCUTAneous Nightly Hernan Smart MD   10 Units at 03/24/22 2209    HYDROmorphone (DILAUDID) injection 0.5 mg  0.5 mg IntraVENous Q4H PRN Beth Rios MD   0.5 mg at 03/22/22 0539    polyethylene glycol (GLYCOLAX) packet 17 g  17 g Oral Daily Beth Rios MD   17 g at 03/25/22 0936    sodium chloride flush 0.9 % injection 5-40 mL  5-40 mL IntraVENous 2 times per day Prema Moss MD   10 mL at 03/25/22 0936    sodium chloride flush 0.9 % injection 5-40 mL  5-40 mL IntraVENous PRN Prema Moss MD        0.9 % sodium chloride infusion  25 mL IntraVENous PRN Prema Moss  mL/hr at 03/24/22 1737 25 mL at 03/24/22 1737    ondansetron (ZOFRAN-ODT) disintegrating tablet 4 mg  4 mg Oral Q8H PRN Prema Moss MD        Or    ondansetron [facility]) injection 4 mg  4 mg IntraVENous Q6H PRN Prema Moss MD   4 mg at 03/17/22 2111    polyethylene glycol (GLYCOLAX) packet 17 g  17 g Oral Daily PRN Prema Moss MD   17 g at 03/18/22 1758    acetaminophen (TYLENOL) tablet 650 mg  650 mg Oral Q6H PRN Prema Moss MD   650 mg at 03/24/22 1636    Or    acetaminophen (TYLENOL) suppository 650 mg  650 mg Rectal Q6H PRN Prema Moss MD        heparin (porcine) injection 5,000 Units  5,000 Units SubCUTAneous 3 times per day Prema Moss MD   5,000 Units at 03/25/22 1436    allopurinol (ZYLOPRIM) tablet 100 mg  100 mg Oral Daily Prema Moss MD   100 mg at 03/25/22 1321    pregabalin (LYRICA) capsule 50 mg  50 mg Oral BID Prema Moss MD   50 mg at 03/25/22 9990         Review of Systems:     · Constitutional: No Fever or Weight Loss   · Eyes: No Decreased Vision  · ENT: No Headaches, Hearing Loss or Vertigo  · Cardiovascular:   no chest pain,  no dyspnea on exertion,  no palpitations or loss of consciousness  · Respiratory: No cough or wheezing    · Gastrointestinal: No abdominal pain, appetite loss, blood in stools, constipation, diarrhea or heartburn  · Genitourinary: No dysuria, trouble voiding, or hematuria  · Musculoskeletal:  No gait disturbance, weakness or joint complaints  · Integumentary: No rash or pruritis  · Neurological: No TIA or stroke symptoms  · Psychiatric: No anxiety or depression  · Endocrine: No malaise, fatigue or temperature intolerance  · Hematologic/Lymphatic: No bleeding problems, blood clots or swollen lymph nodes  · Allergic/Immunologic: No nasal congestion or hives    All other systems were reviewed and were negative otherwise. Physical Examination:      Vitals:    03/25/22 1521   BP: 84/73   Pulse:    Resp:    Temp:    SpO2:       Wt Readings from Last 3 Encounters:   03/19/22 204 lb 8 oz (92.8 kg)   01/06/22 210 lb (95.3 kg)   11/28/21 210 lb (95.3 kg)     Body mass index is 27.73 kg/m². General Appearance:  No distress, conversant  Constitutional:  Well developed, Well nourished  HEENT:  Normocephalic, Atraumatic, Oropharynx moist   Nose normal. Neck Supple Carotid: no carotid bruit  Eyes:  Conjunctiva normal, No discharge. Respiratory:    Normal breath sounds, No respiratory distress, No wheezing, no use of accessory muscles, diaphragm movement is normal  No chest Tenderness  Cardiovascular: S1-S2 No murmurs auscultated. No rubs, thrills or gallops. Normal  rhythm. Pedal pulses are normal. No pedal edema  GI:  Soft Non tender, non distended. Musculoskeletal:   No tenderness, No cyanosis, No clubbing. Integument:  Warm, Dry, No erythema, No rash. Lymphatic:  No lymphadenopathy noted. Neurologic:  Alert & oriented x 3  No focal deficits noted. Psychiatric:  Affect normal, Judgment normal, Mood normal.       Lab Review     Recent Labs     03/25/22  1027   WBC 8.9   HGB 8.9*   HCT 29.4*         Recent Labs     03/25/22  1027      K 5.2*   CL 99   CO2 18*   BUN 39*   CREATININE 2.5*     No results for input(s): AST, ALT, ALB, BILIDIR, BILITOT, ALKPHOS in the last 72 hours. No results for input(s): TROPONINI in the last 72 hours. Lab Results   Component Value Date    BNP 44 03/23/2013     Lab Results   Component Value Date    INR 1.20 03/17/2022    PROTIME 15.5 (H) 03/17/2022         All labs, images, EKGs were personally reviewed      Assessment: 78 y. o.year old with PMH of  has a past medical history of Acute urinary tract infection, Ataxia, Diabetes mellitus ([facility] [address] Fusion of spine of cervical region, Gait disturbance, History of prostate cancer, History of tobacco use, Hyperlipidemia, and Osteoarthritis. Medical Decision Making :       1. AVNRT/SVT    Converted to sinus rhythm with 5 mg IV Lopressor  Incidental, likely in setting of sepsis  Stop Norvasc  Start patient on low-dose Toprol-XL, 12.5 mg daily   Obtain echocardiogram      2. Hyperlipidemia: Continue with Lipitor 40 mg daily  3. Diabetes mellitus: On insulin  4. Urinary tract infection/sepsis: Patient is on IV antibiotics  5. Acute renal failure and hydronephrosis: Management as per nephrology/urology  6. Metabolic encephalopathy on admission: Patient has waxing and waning mentation.   Currently fully alert        Thank you for the consult    Dr. Elisha Tijerina  3/25/2022 6:31 PM

## 2022-03-25 NOTE — PROGRESS NOTES
Call from nurse that patient had heart rate in the 140s. Irregular rhythm with asymptomatic. Systolic 797 ordered Lopressor 5 mg IV once with improvement of the heart rate to 214-372 range but systolic dropped into the 80s and patient complaining of left arm numbness. No focal deficits on exam to warrant stroke alert. Obtaining CT of the head and bolusing 500 cc of normal saline. Will consult cardiology and if heart rate worsens an d will consider starting amiodarone.  Checking labs and echo

## 2022-03-25 NOTE — PROGRESS NOTES
Progress Note( Dr. Masood Gonsales)  3/24/2022  Subjective:   Admit Date: 3/16/2022  PCP: Mike Thompson MD    Admitted For :Sepsis from UTI generalized  weakness    Consulted For: Better control of blood glucose    Interval History: feels better  Still feels weak     Denies any chest pains,   Denies SOB . Denies nausea or vomiting. No new bowel or bladder symptoms. Intake/Output Summary (Last 24 hours) at 3/24/2022 2157  Last data filed at 3/24/2022 1327  Gross per 24 hour   Intake 490 ml   Output 650 ml   Net -160 ml       DATA    CBC:   Recent Labs     03/22/22  0823 03/23/22  0721 03/24/22  1105   WBC 8.7 8.8 8.4   HGB 8.2* 8.6* 8.1*    289 362    CMP:  Recent Labs     03/22/22  0823 03/23/22  0721 03/24/22  1105    143 142   K 4.4 5.2* 4.7    104 105   CO2 23 23 23   BUN 37* 37* 38*   CREATININE 2.0* 2.2* 2.2*   CALCIUM 8.5 9.3 9.2     Lipids: No results found for: CHOL, HDL, TRIG  Glucose:  Recent Labs     03/24/22  1054 03/24/22  1635 03/24/22  2136   POCGLU 227* 235* 200*     [id number]:  Lab Results   Component Value Date    LABA1C 9.2 03/22/2022     High Sensitivity TSH:   Lab Results   Component Value Date    TSHHS 3.720 02/11/2018     Free T3: No results found for: FT3  Free T4:No results found for: T4FREE    VL DUP LOWER EXTREMITY VENOUS BILATERAL   Final Result   No evidence of DVT in either lower extremity. XR HIP 2-3 VW W PELVIS LEFT   Final Result   No acute abnormality or arthropathy of the hip. CT ABDOMEN PELVIS WO CONTRAST Additional Contrast? None   Final Result   1. Well-positioned left ureteric stent with no evidence of hydronephrosis,   renal abscess or other acute abnormality. Normal right kidney. 2. Minimal atelectatic changes in both lower lobes. 3. Well-positioned suprapubic catheter in the urinary bladder. FL LESS THAN 1 HOUR   Final Result      CT ABDOMEN PELVIS WO CONTRAST Additional Contrast? None   Final Result   1.   There is left-sided hydronephrosis and hydroureter despite the presence   of the left ureteral stent. Increased left perinephric stranding and fluid   compared to the right side. Will need to correlate for functionality of the   stent versus ascending urinary tract infection. 2.  The urinary bladder is collapsed around the suprapubic catheter. The   distal coil of the ureteral stent courses into the urinary bladder along the   inferior aspect or an expanded upper portion of the urethral junction. Previous prostate surgery is again noted. 3.  Constipation in the ascending and transverse colon. No small bowel   obstruction or pneumoperitoneum. 4.  Mild thickening of the distal esophagus may relate to reflux esophagitis. CT HEAD WO CONTRAST   Final Result   No acute intracranial abnormality. MRI may be obtained if clinically   indicated. XR CHEST PORTABLE   Final Result   1. Worsening pulmonary edema.               Scheduled Medicines   Medications:    chlorthalidone  25 mg Oral Daily    amLODIPine  5 mg Oral Daily    vancomycin (VANCOCIN) intermittent dosing (placeholder)   Other RX Placeholder    glipiZIDE  5 mg Oral BID AC    aspirin  81 mg Oral Daily    cephALEXin  500 mg Oral 4 times per day    atorvastatin  40 mg Oral Nightly    insulin lispro  0-6 Units SubCUTAneous TID WC    insulin glargine  10 Units SubCUTAneous Nightly    insulin lispro  0-6 Units SubCUTAneous Nightly    polyethylene glycol  17 g Oral Daily    sodium chloride flush  5-40 mL IntraVENous 2 times per day    heparin (porcine)  5,000 Units SubCUTAneous 3 times per day    allopurinol  100 mg Oral Daily    pregabalin  50 mg Oral BID      Infusions:    dextrose      sodium chloride 25 mL (03/24/22 0659)         Objective:   Vitals: BP (!) 146/69   Pulse 87   Temp 100.6 °F (38.1 °C)   Resp 16   Ht 6' (1.829 m)   Wt 204 lb 8 oz (92.8 kg)   SpO2 96%   BMI 27.74 kg/m²   General appearance: alert and cooperative with exam  Neck: no JVD or bruit  Thyroid : Normal lobes   Lungs: Has Vesicular Breath sounds   Heart:  regular rate and rhythm  Abdomen: soft, non-tender; bowel sounds normal; no masses,  no organomegaly has Surapubic Catheter   Musculoskeletal: Normal  Extremities: extremities normal, , no edema  Neurologic:  Awake, alert, oriented to name, place and time. Cranial nerves II-XII are grossly intact. Motor weakness . Sensory is intact. ,  and gait is abnormal.unstable     Assessment:     Patient Active Problem List:     Gait disturbance     Generalized weakness     Diabetes mellitus (HCC)     Osteoarthritis     Anemia of chronic disorder     Infected implanted bladder sphincter cuff     Escherichia coli urinary tract infection     Hypertension     Sepsis ([address]     Type 2 diabetes mellitus with hypoglycemia without coma ([address]     UTI (urinary tract infection) due to urinary indwelling catheter (HCC)     Hyperkalemia     Acute kidney failure (HCC)     Leg weakness, bilateral     Type 2 diabetes mellitus with neurological manifestations, uncontrolled ([address]     Complicated UTI (urinary tract infection)     Essential hypertension     Severe muscle deconditioning     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 (HCC)     Frequent falls     Chronic kidney disease, stage 3a ([address]     Uncontrolled type 2 diabetes mellitus with peripheral neuropathy (HCC)     Prostate cancer ([address]     Suprapubic catheter ([address]     Sepsis secondary to UTI ([address]      Plan:     1. Reviewed POC blood glucose . Labs and X ray results   2. Reviewed Current Medicines   3. On meal/ Correction bolus Humalog/ Basal Lantus Insulin regime / and   4. Monitor Blood glucose frequently   5. Modified  the dose of Insulin/ other medicines as needed   6. Waiting for insurance approval for transfer to skilled nursing unit  7. Will follow     .      eLona Germain MD, MD

## 2022-03-25 NOTE — PROGRESS NOTES
Physical Therapy    Physical Therapy Treatment Note  Name: Karime Hurst MRN: [id number] :   1940   Date:  3/25/2022   Admission Date: 3/16/2022 Room:  [address] [place] [address]   Restrictions/Precautions:  Restrictions/Precautions  Restrictions/Precautions: General Precautions,Fall Risk       admission for Sepsis  Communication with other providers:  Per nurse ok to tx and was notified at end of tx chair alarm in room but no string to attach to pt. Co-tx with BALBUENA for safety  Subjective:  Patient states:  Pt agreeable to tx  Pain:   Location, Type, Intensity (0/10 to 10/10):  Pt did not give pain rating and only reports feeling very stiff and back pain with transfer sup to sit. Objective:    Observation:  Alert and oriented to self  Treatment, including education/measures:  Sup to sit max assist of 2 and needing increased time and effort  Sit<=>stand from bed mod assist of one and cga/min assist of one but once in standing mod assist of to for balance. Standing at walker x 2 reps and working on wt shifts and attempting stepping on place but needing max assist of 2 for balance and safety. SPT transfer bed to built up chair max assist of 2. Ex in sitting at EOB:   trunk stretches with cues for deep breathing  10 reps aps  10 reps marching  10 reps laqs  Safety  Patient left safely in the chair, with call light/phone in reach. Gait belt and mask were used for transfers and gait. Nurse checking on chair alarm. Assessment / Impression:       Patient's tolerance of treatment:  good   Adverse Reaction: na  Significant change in status and impact:  na  Barriers to improvement:   Strength and safety  Plan for Next Session:    Cont.  POC  Time in:  830  Time out:  938  Timed treatment minutes:  68  Total treatment time:  76    Previously filed items:  Social/Functional History  Lives With: Son  Type of Home: House  Home Layout: One level  Home Access: Stairs to enter with rails  Entrance Stairs - Number of Steps: 2  Bathroom Shower/Tub: Tub/Shower unit  Bathroom Toilet: Standard  Bathroom Equipment: Grab bars in shower,Shower chair  Home Equipment: [address]er  ADL Assistance: [address]: Independent  Ambulation Assistance: Independent (mod I with 4ww)  Transfer Assistance: [address] term goals  Time Frame for Long term goals :  In one week:  Long term goal 1: Pt will complete all bed mobility with minAx1  Long term goal 2: Pt will complete sit <> stand transfers with modAx2  Long term goal 3: Pt will complete bed <> chair transfers with modAx2  Long term goal 4: Pt will ambulate 10 feet with modAx2 with LRAD  Long term goal 5: Pt will independently complete 3 sets of 10 reps of BLE AROM exercises in available and allowed ROM    Electronically signed by:    Jessika Nunn PTA  3/25/2022, 8:17 AM

## 2022-03-25 NOTE — PROGRESS NOTES
Pt [address], pt asymptomatic. Charge nurse Louisa notified and Dr. Charisma Apodaca paged via perfect serve; waiting for response,.

## 2022-03-26 ENCOUNTER — APPOINTMENT (OUTPATIENT)
Dept: ULTRASOUND IMAGING | Age: 82
DRG: 659 | End: 2022-03-26
Payer: MEDICARE

## 2022-03-26 LAB
GLUCOSE BLD-MCNC: 187 MG/DL (ref 70–99)
GLUCOSE BLD-MCNC: 221 MG/DL (ref 70–99)
GLUCOSE BLD-MCNC: 226 MG/DL (ref 70–99)
GLUCOSE BLD-MCNC: 233 MG/DL (ref 70–99)

## 2022-03-26 PROCEDURE — 6370000000 HC RX 637 (ALT 250 FOR IP): Performed by: INTERNAL MEDICINE

## 2022-03-26 PROCEDURE — 6360000002 HC RX W HCPCS: Performed by: INTERNAL MEDICINE

## 2022-03-26 PROCEDURE — 80202 ASSAY OF VANCOMYCIN: CPT

## 2022-03-26 PROCEDURE — 6370000000 HC RX 637 (ALT 250 FOR IP): Performed by: HOSPITALIST

## 2022-03-26 PROCEDURE — 2580000003 HC RX 258: Performed by: INTERNAL MEDICINE

## 2022-03-26 PROCEDURE — 99232 SBSQ HOSP IP/OBS MODERATE 35: CPT | Performed by: INTERNAL MEDICINE

## 2022-03-26 PROCEDURE — APPSS15 APP SPLIT SHARED TIME 0-15 MINUTES: Performed by: NURSE PRACTITIONER

## 2022-03-26 PROCEDURE — 1200000000 HC SEMI PRIVATE

## 2022-03-26 PROCEDURE — 84439 ASSAY OF FREE THYROXINE: CPT

## 2022-03-26 PROCEDURE — 82962 GLUCOSE BLOOD TEST: CPT

## 2022-03-26 PROCEDURE — 76536 US EXAM OF HEAD AND NECK: CPT

## 2022-03-26 RX ORDER — LEVOTHYROXINE SODIUM 0.05 MG/1
50 TABLET ORAL DAILY
Status: DISCONTINUED | OUTPATIENT
Start: 2022-03-26 | End: 2022-03-30 | Stop reason: HOSPADM

## 2022-03-26 RX ORDER — GLIPIZIDE 5 MG/1
10 TABLET ORAL
Status: DISCONTINUED | OUTPATIENT
Start: 2022-03-26 | End: 2022-03-28

## 2022-03-26 RX ORDER — INSULIN GLARGINE 100 [IU]/ML
15 INJECTION, SOLUTION SUBCUTANEOUS NIGHTLY
Status: DISCONTINUED | OUTPATIENT
Start: 2022-03-26 | End: 2022-03-27

## 2022-03-26 RX ADMIN — PREGABALIN 50 MG: 50 CAPSULE ORAL at 08:34

## 2022-03-26 RX ADMIN — ALLOPURINOL 100 MG: 100 TABLET ORAL at 08:34

## 2022-03-26 RX ADMIN — HEPARIN SODIUM 5000 UNITS: 5000 INJECTION INTRAVENOUS; SUBCUTANEOUS at 05:37

## 2022-03-26 RX ADMIN — SODIUM CHLORIDE, PRESERVATIVE FREE 10 ML: 5 INJECTION INTRAVENOUS at 08:26

## 2022-03-26 RX ADMIN — METOPROLOL SUCCINATE 12.5 MG: 25 TABLET, EXTENDED RELEASE ORAL at 08:34

## 2022-03-26 RX ADMIN — CEPHALEXIN 500 MG: 500 CAPSULE ORAL at 12:12

## 2022-03-26 RX ADMIN — CEPHALEXIN 500 MG: 500 CAPSULE ORAL at 05:37

## 2022-03-26 RX ADMIN — LEVOTHYROXINE SODIUM 50 MCG: 0.05 TABLET ORAL at 12:24

## 2022-03-26 RX ADMIN — ASPIRIN 81 MG CHEWABLE TABLET 81 MG: 81 TABLET CHEWABLE at 08:34

## 2022-03-26 RX ADMIN — GLIPIZIDE 7.5 MG: 5 TABLET ORAL at 08:38

## 2022-03-26 RX ADMIN — CEPHALEXIN 500 MG: 500 CAPSULE ORAL at 01:31

## 2022-03-26 RX ADMIN — POLYETHYLENE GLYCOL (3350) 17 G: 17 POWDER, FOR SOLUTION ORAL at 08:27

## 2022-03-26 RX ADMIN — CEPHALEXIN 500 MG: 500 CAPSULE ORAL at 17:05

## 2022-03-26 RX ADMIN — HEPARIN SODIUM 5000 UNITS: 5000 INJECTION INTRAVENOUS; SUBCUTANEOUS at 14:39

## 2022-03-26 RX ADMIN — GLIPIZIDE 10 MG: 5 TABLET ORAL at 17:05

## 2022-03-26 ASSESSMENT — PAIN DESCRIPTION - PROGRESSION: CLINICAL_PROGRESSION: NOT CHANGED

## 2022-03-26 ASSESSMENT — PAIN SCALES - GENERAL
PAINLEVEL_OUTOF10: 5
PAINLEVEL_OUTOF10: 0

## 2022-03-26 NOTE — PROGRESS NOTES
Nephrology Progress Note  3/26/2022 12:58 PM        Subjective:   Admit Date: 3/16/2022  PCP: Donna Shaw MD    Interval History: Patient seen in early morning, this is a late entry    Diet: Eating some    ROS: No shortness of breath or confusion  Urine output recorded 1100 cc for the last 24 hours  Low-grade fever  Variable recorded blood pressure  Apparently had tachycardia yesterday    Data:     Current meds:    glipiZIDE  10 mg Oral BID AC    insulin glargine  15 Units SubCUTAneous Nightly    levothyroxine  50 mcg Oral Daily    insulin lispro  0-12 Units SubCUTAneous TID WC    insulin lispro  0-6 Units SubCUTAneous Nightly    mirtazapine  15 mg Oral Nightly    metoprolol succinate  12.5 mg Oral Daily    [Held by provider] chlorthalidone  25 mg Oral Daily    vancomycin (VANCOCIN) intermittent dosing (placeholder)   Other RX Placeholder    aspirin  81 mg Oral Daily    cephALEXin  500 mg Oral 4 times per day    atorvastatin  40 mg Oral Nightly    polyethylene glycol  17 g Oral Daily    sodium chloride flush  5-40 mL IntraVENous 2 times per day    heparin (porcine)  5,000 Units SubCUTAneous 3 times per day    allopurinol  100 mg Oral Daily    pregabalin  50 mg Oral BID      dextrose      sodium chloride 25 mL (03/24/22 1207)         I/O last 3 completed shifts: In: 105 [P.O.:100;  I.V.:5]  Out: 2200 [Urine:2200]    CBC:   Recent Labs     03/24/22  1105 03/25/22  1027   WBC 8.4 8.9   HGB 8.1* 8.9*    400          Recent Labs     03/24/22  1105 03/25/22  1027    140   K 4.7 5.2*    99   CO2 23 18*   BUN 38* 39*   CREATININE 2.2* 2.5*   GLUCOSE 231* 158*       Lab Results   Component Value Date    CALCIUM 9.0 03/25/2022    PHOS 3.3 03/19/2022       Objective:     Vitals: BP (!) 165/62   Pulse 84   Temp 98.8 °F (37.1 °C) (Oral)   Resp 18   Ht 6' 0.01\" (1.829 m)   Wt 204 lb 8 oz (92.8 kg)   SpO2 100%   BMI 27.73 kg/m²     General appearance: Alert, awake and oriented

## 2022-03-26 NOTE — PROGRESS NOTES
ATTENDING PHYSICIAN'S PROGRESS NOTES    Patient:  Elida Ponce      Unit/Bed:1124/1124-A    YOB: 1940    MRN: [id number]     Acct: [de-identified]     Admit date: 3/16/2022    Patient Seen, Chart, Consults notes, Labs, Radiology studies reviewed. SUBJECTIVE:   Day 9 of stay with UTI with sepsis, and subsequently developed an SVT, also found to have PAMELLA on baseline CKD stage IV and most recent (in last 24 hours) has had some improvement in his presentation but not totally back to baseline. Patient's son was at the bedside. Patient still confused and disoriented, sleepy but arousable but in no obvious distress. Has been afebrile overnight. All other ROS negative except noted in HPI    Past, Family, Social History unchanged from admission. Diet:  ADULT ORAL NUTRITION SUPPLEMENT; Lunch, Dinner; Frozen Oral Supplement  ADULT DIET;  Regular; 5 carb choices (75 gm/meal)  ADULT ORAL NUTRITION SUPPLEMENT; Breakfast; Fortified Pudding Oral Supplement    Medications:  Scheduled Meds:   glipiZIDE  10 mg Oral BID AC    insulin glargine  15 Units SubCUTAneous Nightly    levothyroxine  50 mcg Oral Daily    insulin lispro  0-12 Units SubCUTAneous TID WC    insulin lispro  0-6 Units SubCUTAneous Nightly    mirtazapine  15 mg Oral Nightly    metoprolol succinate  12.5 mg Oral Daily    [Held by provider] chlorthalidone  25 mg Oral Daily    vancomycin (VANCOCIN) intermittent dosing (placeholder)   Other RX Placeholder    aspirin  81 mg Oral Daily    cephALEXin  500 mg Oral 4 times per day    atorvastatin  40 mg Oral Nightly    polyethylene glycol  17 g Oral Daily    sodium chloride flush  5-40 mL IntraVENous 2 times per day    heparin (porcine)  5,000 Units SubCUTAneous 3 times per day    allopurinol  100 mg Oral Daily    pregabalin  50 mg Oral BID     Continuous Infusions:   dextrose      sodium chloride 25 mL (03/24/22 6052)     PRN Meds:oxyCODONE, glucose, glucagon (rDNA), dextrose, dextrose bolus (hypoglycemia) **OR** dextrose bolus (hypoglycemia), HYDROmorphone, sodium chloride flush, sodium chloride, ondansetron **OR** ondansetron, polyethylene glycol, acetaminophen **OR** acetaminophen    OBJECTIVE:    CBC:   Recent Labs     03/24/22  1105 03/25/22  1027   WBC 8.4 8.9   HGB 8.1* 8.9*    400     BMP:    Recent Labs     03/24/22  1105 03/25/22  1027    140   K 4.7 5.2*    99   CO2 23 18*   BUN 38* 39*   CREATININE 2.2* 2.5*   GLUCOSE 231* 158*     Calcium:  Recent Labs     03/25/22  1027   CALCIUM 9.0     Ionized Calcium:No results for input(s): IONCA in the last 72 hours. Magnesium:No results for input(s): MG in the last 72 hours. Phosphorus:No results for input(s): PHOS in the last 72 hours. BNP:No results for input(s): BNP in the last 72 hours. Glucose:  Recent Labs     03/26/22  0720 03/26/22  1105 03/26/22  1602   POCGLU 221* 187* 226*     HgbA1C: No results for input(s): LABA1C in the last 72 hours. INR: No results for input(s): INR in the last 72 hours. Hepatic: No results for input(s): ALKPHOS, ALT, AST, PROT, BILITOT, BILIDIR, LABALBU in the last 72 hours. Amylase and Lipase:No results for input(s): LACTA, AMYLASE in the last 72 hours. Lactic Acid: No results for input(s): LACTA in the last 72 hours. Troponin:   Recent Labs     03/25/22  1624   TROPONINT 0.028*     BNP: No results for input(s): BNP in the last 72 hours. Lipids: No results for input(s): CHOL, TRIG, HDL, LDL, LDLCALC in the last 72 hours. ABGs: No results found for: PH, PCO2, PO2, HCO3, O2SAT    Radiology reports as per the Radiologist  Radiology: CT ABDOMEN PELVIS WO CONTRAST Additional Contrast? None    Result Date: 3/17/2022  EXAMINATION: CT OF THE ABDOMEN AND PELVIS WITHOUT CONTRAST 3/17/2022 3:04 am TECHNIQUE: CT of the abdomen and pelvis was performed without the administration of intravenous contrast. Multiplanar reformatted images are provided for review.  Dose modulation, iterative reconstruction, and/or weight based adjustment of the mA/kV was utilized to reduce the radiation dose to as low as reasonably achievable. COMPARISON: 09/12/2020 HISTORY: ORDERING SYSTEM PROVIDED HISTORY: UTI with altered mental status and history of renal stones TECHNOLOGIST PROVIDED HISTORY: Reason for exam:->UTI with altered mental status and history of renal stones Additional Contrast?->None Decision Support Exception - unselect if not a suspected or confirmed emergency medical condition->Emergency Medical Condition (MA) Reason for Exam: UTI with altered mental status and history of renal stones FINDINGS: Lower Chest: Calcified lymph nodes in the right hilar region. Atelectatic or fibrotic changes along the posterior lungs. Organs: Lack of IV contrast does reduce the evaluation of the organs and vasculature. There is streak artifact from the arms at the sides. No obvious masses seen within the liver. The gallbladder is distended and may have a tiny gallstone in the lumen. There is no fat stranding of the gallbladder fossa. The spleen is not enlarged but does have multiple calcified granulomas. No pancreatic calcification or ductal dilatation. There is stranding and edema adjacent to the pancreatic tail but appears more associated with the left kidney. The adrenal glands are not enlarged. The right kidney has mild perinephric stranding no hydronephrosis or hydroureter. The left kidney does have asymmetric edema and increased perinephric stranding. Small amount of fluid are seen in the pararenal space. A left ureteral stent is present coursing down into the urinary bladder. No focal calcifications are seen along the course of the stent. However there is still the left-sided hydronephrosis and hydroureter. GI/Bowel: The stomach, small bowel, and colon are not dilated. There is a small hiatal hernia with mild thickening of the distal esophagus.  Constipation and gaseous distension of the ascending and transverse colon. Appendix is identified and no focal appendicitis. There is no pneumoperitoneum. Pelvis: The urinary bladder is decompressed with a suprapubic catheter. Cannot accurately evaluate the urinary bladder wall. The distal coil of the left ureteral stent is in the inferior aspect versus is an expanded upper portion of the urethral junction. 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 Peritoneum/Retroperitoneum: No abdominal aortic aneurysm but does have moderate calcification. There is no retroperitoneal hematoma. Small left periaortic lymph nodes were present on the previous exam as well. Mild increase in size may be reactive to the left kidney. Bones/Soft Tissues: Degenerative changes in the disc spaces, facet joints, and sacroiliac joints. Large Schmorl node in the inferior endplate of L48 is increased in size. Smaller Schmorl nodes along the lumbar endplates appear stable. 1.  There is left-sided hydronephrosis and hydroureter despite the presence of the left ureteral stent. Increased left perinephric stranding and fluid compared to the right side. Will need to correlate for functionality of the stent versus ascending urinary tract infection. 2.  The urinary bladder is collapsed around the suprapubic catheter. The distal coil of the ureteral stent courses into the urinary bladder along the inferior aspect or an expanded upper portion of the urethral junction. Previous prostate surgery is again noted. 3.  Constipation in the ascending and transverse colon. No small bowel obstruction or pneumoperitoneum. 4.  Mild thickening of the distal esophagus may relate to reflux esophagitis.      CT HEAD WO CONTRAST    Result Date: 3/17/2022  EXAMINATION: CT OF THE HEAD WITHOUT CONTRAST  3/17/2022 12:19 am TECHNIQUE: CT of the head was performed without the administration of intravenous contrast. Dose modulation, iterative reconstruction, and/or weight based adjustment of the mA/kV was utilized to reduce the radiation dose to as low as reasonably achievable. COMPARISON: 11/16/2021 HISTORY: ORDERING SYSTEM PROVIDED HISTORY: AMS TECHNOLOGIST PROVIDED HISTORY: Reason for exam:->AMS Has a \"code stroke\" or \"stroke alert\" been called? ->No Decision Support Exception - unselect if not a suspected or confirmed emergency medical condition->Emergency Medical Condition (MA) Reason for Exam: AMS FINDINGS: BRAIN/VENTRICLES: There is no acute intracranial hemorrhage, mass effect or midline shift. No abnormal extra-axial fluid collection. The gray-white differentiation is maintained without evidence of an acute infarct. Hypoattenuation of the periventricular and subcortical white matter is suggestive of chronic small vessel ischemic disease. Mild diffuse parenchymal volume loss is noted. There is no evidence of hydrocephalus. ORBITS: The bilateral globes are intact. SINUSES: Mucoperiosteal thickening of the bilateral ethmoid air cells is noted. Other visualized paranasal sinuses and mastoid air cells are clear. SOFT TISSUES/SKULL:  No acute abnormality of the visualized skull or soft tissues. No acute intracranial abnormality. MRI may be obtained if clinically indicated. FL LESS THAN 1 HOUR    Result Date: 3/17/2022  Radiology exam is complete. No Radiologist dictation. Please follow up with ordering provider. XR CHEST PORTABLE    Result Date: 3/17/2022  EXAMINATION: ONE XRAY VIEW OF THE CHEST 3/16/2022 11:48 pm COMPARISON: 01/06/2022 HISTORY: ORDERING SYSTEM PROVIDED HISTORY: fever TECHNOLOGIST PROVIDED HISTORY: Reason for exam:->fever Reason for Exam: FEVER FINDINGS: The cardiac silhouette is at the upper limits of normal in size. Vascular calcifications are noted along the aortic arch. Pulmonary edema is increasing. No focal lung infiltrate. No pleural effusion or pneumothorax. The bones are osteopenic. 1. Worsening pulmonary edema.         Physical Exam:  Vitals: BP (!) 149/84   Pulse 74   Temp 97.5 °F (36.4 °C) (Oral)   Resp 14   Ht 6' 0.01\" (1.829 m)   Wt 204 lb 8 oz (92.8 kg)   SpO2 100%   BMI 27.73 kg/m²   24 hour intake/output:    Intake/Output Summary (Last 24 hours) at 3/26/2022 1631  Last data filed at 3/26/2022 0831  Gross per 24 hour   Intake 250 ml   Output 1100 ml   Net -850 ml     Last 3 weights: Wt Readings from Last 3 Encounters:   03/19/22 204 lb 8 oz (92.8 kg)   01/06/22 210 lb (95.3 kg)   11/28/21 210 lb (95.3 kg)       General appearance - acyanotic, in no respiratory distress, well hydrated and chronically ill appearing  HEENT: Normocephalic, Atraumatic, Conjuctiva pink, PERRL, Oral mucosa normal, Lips, teeth and gums normal, Trachea midline, Thyroid normal and No noted lymphadenopathy  Chest - clear to auscultation, no wheezes, rales or rhonchi, symmetric air entry  Cardiovascular - normal rate, regular rhythm, normal S1, S2, no murmurs, rubs, clicks or gallops  Abdomen - soft, nontender, nondistended, no masses or organomegaly   Neurological -drowsy but arousable, No focal findings or movement disorder noted and Motor and sensory grossly normal bilaterally  Integumentary - Skin color, texture, turgor normal. No Rashes or lesions  Musculoskeletal -Full ROM times 4 extremities, No clubbing or cyanosis and No peripheral edema      DVT prophylaxis: [] Lovenox                                 [] SCDs                                 [x] SQ Heparin                                 [] Encourage ambulation           [] Already on Anticoagulation                 ASSESSMENT / PLAN :    Principal Problem:    Sepsis secondary to UTI ([facility] [address]  Blood cultures on 3/17 growing Klebsiella pneumonia and patient is on Keflex which is sensitive. We will continue to monitor closely and may repeat the UA with culture to establish clearance.       SVT (supraventricular tachycardia) ([facility] [address]  Patient being maintained on low-dose metoprolol with heart rate well controlled. Patient being followed closely with further recommendations and management by cardiology consult. Active Problems:    Diabetes mellitus ([facility] [address]  Being managed by endocrinologist      Acute kidney failure ([facility] [address] on baseline Chronic kidney disease (CKD), stage IV (severe) ([facility] [address]  Patient is being closely monitored and managed by nephrology consult with further recommendations. 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 ([address]  Continue with patient's Lipitor dose of 40 mg nightly. Coagulase negative Staphylococcus bacteremia  Final culture results still pending and at the moment difficult to differentiate between a true infection versus colonization/contamination. Acute metabolic encephalopathy  Improved but as per his son at the bedside not back to baseline, will continue with current management and patient will benefit from placement in the [facility] for rehabilitation and recuperation. Resolved Problems:    * No resolved hospital problems. *    Management as outlined above, continue with current regimen and monitor closely for control, will be maintaining patient in-house over the weekend for earliest discharge next week whenever pre-CERT is approved.     Electronically signed by Agapito Marte MD on 3/26/2022 at 4:31 PM    [address] [place] [address]

## 2022-03-26 NOTE — PROGRESS NOTES
Cardiology Note    Today's Plan: continue current medical management. Admit Date:  3/16/2022    Admission diagnosis / Complaint: ? Tachycardia      Subjective:  Mr. Fransisco Staples is resting in bed. Reports that he is feeling well today. Denies chest pain, palpitations, shortness of breath, light headedness, dizziness, edema, or syncope. On tele, 1 episode of sinus tachycardia yesterday. No further episodes overnight. Objective:   BP (!) 165/62   Pulse 84   Temp 98.8 °F (37.1 °C) (Oral)   Resp 18   Ht 6' 0.01\" (1.829 m)   Wt 204 lb 8 oz (92.8 kg)   SpO2 100%   BMI 27.73 kg/m²     Intake/Output Summary (Last 24 hours) at 3/26/2022 9976  Last data filed at 3/26/2022 0831  Gross per 24 hour   Intake 250 ml   Output 1100 ml   Net -850 ml       TELEMETRY: Sinus    has a past medical history of Acute urinary tract infection, Ataxia, Diabetes mellitus ([address] Fusion of spine of cervical region, Gait disturbance, History of prostate cancer, History of tobacco use, Hyperlipidemia, and Osteoarthritis. has a past surgical history that includes Prostate surgery; other surgical history (3/28/13); Colon surgery; Bladder surgery; Prostatectomy (1996); and Bladder surgery (Left, 3/17/2022).        Physical Exam:  General:  Awake, alert, NAD- mccrary catheter noted  Skin:  Warm and dry  Neck:  JVD not appreciated  Chest: diminished breath sounds, respiration easy,   Cardiovascular:  RRR S1S2  Abdomen:  Soft nontender  Extremities: trace edema    Medications:    glipiZIDE  7.5 mg Oral BID AC    insulin lispro  0-12 Units SubCUTAneous TID WC    insulin lispro  0-6 Units SubCUTAneous Nightly    mirtazapine  15 mg Oral Nightly    metoprolol succinate  12.5 mg Oral Daily    [Held by provider] chlorthalidone  25 mg Oral Daily    vancomycin (VANCOCIN) intermittent dosing (placeholder)   Other RX Placeholder    aspirin  81 mg Oral Daily    cephALEXin  500 mg Oral 4 times per day    atorvastatin  40 mg Oral Nightly    insulin glargine  10 Units SubCUTAneous Nightly    polyethylene glycol  17 g Oral Daily    sodium chloride flush  5-40 mL IntraVENous 2 times per day    heparin (porcine)  5,000 Units SubCUTAneous 3 times per day    allopurinol  100 mg Oral Daily    pregabalin  50 mg Oral BID      dextrose      sodium chloride 25 mL (22 1737)     oxyCODONE, glucose, glucagon (rDNA), dextrose, dextrose bolus (hypoglycemia) **OR** dextrose bolus (hypoglycemia), HYDROmorphone, sodium chloride flush, sodium chloride, ondansetron **OR** ondansetron, polyethylene glycol, acetaminophen **OR** acetaminophen    Lab Data:  CBC:   Recent Labs     22  1105 22  1027   WBC 8.4 8.9   HGB 8.1* 8.9*   HCT 26.2* 29.4*   MCV 96.0 97.4    400     BMP:   Recent Labs     22  1105 22  1027    140   K 4.7 5.2*    99   CO2 23 18*   BUN 38* 39*   CREATININE 2.2* 2.5*       Lorena Dsouza, APRN - CNP,  3/26/2022 8:35 AM     I have seen ,spoken to  and examined this patient personally, independently of the NENA. I have reviewed the hospital care given to date and reviewed all pertinent labs and imaging. I have spoken with patient, nursing staff and provided written and verbal instructions . The above note has been reviewed     CARDIOLOGY ATTENDING ADDENDUM    HPI:  I have reviewed the above HPI  And agree with above     Pulse Range: Pulse  Av.3  Min: 80  Max: 84    Blood Presuure Range:  Systolic (65HPR), LZB:476 , Min:84 , WWD:832   ; Diastolic (09UTT), UOR:02, Min:62, Max:77      Pulse ox Range: SpO2  Av.5 %  Min: 99 %  Max: 100 %    24hr I & O:    Intake/Output Summary (Last 24 hours) at 3/26/2022 0954  Last data filed at 3/26/2022 0831  Gross per 24 hour   Intake 250 ml   Output 1100 ml   Net -850 ml         BP (!) 165/62   Pulse 84   Temp 98.8 °F (37.1 °C) (Oral)   Resp 18   Ht 6' 0.01\" (1.829 m)   Wt 204 lb 8 oz (92.8 kg)   SpO2 100%   BMI 27.73 kg/m²       Physical Exam:  General: Awake, alert, NAD  Head:normal  Eye:normal  Neck:  No JVD   Chest:  Clear to auscultation, respiration easy  Cardiovascular:  RRR S1S2  Abdomen:   nontender  Extremities:  tr edema  Pulses; palpable  Neuro: grossly normal      MEDICAL DECISION MAKING;    Pt assessed , chart reviewed, patient examined examined , all available data was reviewed, following is the plan which was discussed with NENA as well:      -Tachycardia that is SVT patient is on beta-blocker that is Toprol-XL 12.5 mg p.o. daily which will be continued  -Hypertension patient is on Toprol which is monitoring his blood pressure which will be continued  -Hyperlipidemia patient is on statin check lipid profile  -Diabetes on insulin which is being monitored  -Patient has Klebsiella bacteremia and is on antibiotic per the primary team  -We will check an echo on Monday for assessment of her EF and to see if there is any evidence of infective endocarditis            Guillermo Robison MD [facility]

## 2022-03-26 NOTE — PROGRESS NOTES
Progress Note( Dr. Fariba Medellin)  3/26/2022  Subjective:   Admit Date: 3/16/2022  PCP: Rosanna Graves MD    Admitted For :Sepsis from UTI generalized  weakness    Consulted For: Better control of blood glucose    Interval History: feels better  Still feels weak  Lab review the patient has mild hypothyroidism elevated TSH level    Denies any chest pains,   Denies SOB . Denies nausea or vomiting. No new bowel or bladder symptoms. Intake/Output Summary (Last 24 hours) at 3/26/2022 1452  Last data filed at 3/26/2022 0831  Gross per 24 hour   Intake 250 ml   Output 1100 ml   Net -850 ml       DATA    CBC:   Recent Labs     03/24/22  1105 03/25/22  1027   WBC 8.4 8.9   HGB 8.1* 8.9*    400    CMP:  Recent Labs     03/24/22  1105 03/25/22  1027    140   K 4.7 5.2*    99   CO2 23 18*   BUN 38* 39*   CREATININE 2.2* 2.5*   CALCIUM 9.2 9.0     Lipids: No results found for: CHOL, HDL, TRIG  Glucose:  Recent Labs     03/25/22  2117 03/26/22  0720 03/26/22  1105   POCGLU 204* 221* 187*     [id number]:  Lab Results   Component Value Date    LABA1C 9.2 03/22/2022     High Sensitivity TSH:   Lab Results   Component Value Date    TSHHS 6.190 03/25/2022     Free T3: No results found for: FT3  Free T4:No results found for: T4FREE    US HEAD NECK SOFT TISSUE THYROID   Final Result   Mild changes of chronic thyroiditis. CT HEAD WO CONTRAST   Final Result   1. No acute intracranial abnormality. 2. Stable moderate chronic white matter microvascular ischemic changes and   chronic lacunar infarct in the right basal ganglia. VL DUP LOWER EXTREMITY VENOUS BILATERAL   Final Result   No evidence of DVT in either lower extremity. XR HIP 2-3 VW W PELVIS LEFT   Final Result   No acute abnormality or arthropathy of the hip. CT ABDOMEN PELVIS WO CONTRAST Additional Contrast? None   Final Result   1.  Well-positioned left ureteric stent with no evidence of hydronephrosis,   renal abscess or other acute abnormality. Normal right kidney. 2. Minimal atelectatic changes in both lower lobes. 3. Well-positioned suprapubic catheter in the urinary bladder. FL LESS THAN 1 HOUR   Final Result      CT ABDOMEN PELVIS WO CONTRAST Additional Contrast? None   Final Result   1. There is left-sided hydronephrosis and hydroureter despite the presence   of the left ureteral stent. Increased left perinephric stranding and fluid   compared to the right side. Will need to correlate for functionality of the   stent versus ascending urinary tract infection. 2.  The urinary bladder is collapsed around the suprapubic catheter. The   distal coil of the ureteral stent courses into the urinary bladder along the   inferior aspect or an expanded upper portion of the urethral junction. Previous prostate surgery is again noted. 3.  Constipation in the ascending and transverse colon. No small bowel   obstruction or pneumoperitoneum. 4.  Mild thickening of the distal esophagus may relate to reflux esophagitis. CT HEAD WO CONTRAST   Final Result   No acute intracranial abnormality. MRI may be obtained if clinically   indicated. XR CHEST PORTABLE   Final Result   1. Worsening pulmonary edema.               Scheduled Medicines   Medications:    glipiZIDE  10 mg Oral BID AC    insulin glargine  15 Units SubCUTAneous Nightly    levothyroxine  50 mcg Oral Daily    insulin lispro  0-12 Units SubCUTAneous TID WC    insulin lispro  0-6 Units SubCUTAneous Nightly    mirtazapine  15 mg Oral Nightly    metoprolol succinate  12.5 mg Oral Daily    [Held by provider] chlorthalidone  25 mg Oral Daily    vancomycin (VANCOCIN) intermittent dosing (placeholder)   Other RX Placeholder    aspirin  81 mg Oral Daily    cephALEXin  500 mg Oral 4 times per day    atorvastatin  40 mg Oral Nightly    polyethylene glycol  17 g Oral Daily    sodium chloride flush  5-40 mL IntraVENous 2 times per day    heparin (porcine)  5,000 Units SubCUTAneous 3 times per day    allopurinol  100 mg Oral Daily    pregabalin  50 mg Oral BID      Infusions:    dextrose      sodium chloride 25 mL (03/24/22 4423)         Objective:   Vitals: BP (!) 165/62   Pulse 84   Temp 98.8 °F (37.1 °C) (Oral)   Resp 18   Ht 6' 0.01\" (1.829 m)   Wt 204 lb 8 oz (92.8 kg)   SpO2 100%   BMI 27.73 kg/m²   General appearance: alert and cooperative with exam  Neck: no JVD or bruit  Thyroid : Normal lobes   Lungs: Has Vesicular Breath sounds   Heart:  regular rate and rhythm  Abdomen: soft, non-tender; bowel sounds normal; no masses,  no organomegaly has Surapubic Catheter   Musculoskeletal: Normal  Extremities: extremities normal, , no edema  Neurologic:  Awake, alert, oriented to name, place and time. Cranial nerves II-XII are grossly intact. Motor weakness . Sensory is intact. ,  and gait is abnormal.unstable     Assessment:     Patient Active Problem List:     Gait disturbance     Generalized weakness     Diabetes mellitus (HCC)     Osteoarthritis     Anemia of chronic disorder     Infected implanted bladder sphincter cuff     Escherichia coli urinary tract infection     Hypertension     Sepsis ([address]     Type 2 diabetes mellitus with hypoglycemia without coma ([address]     UTI (urinary tract infection) due to urinary indwelling catheter (HCC)     Hyperkalemia     Acute kidney failure (HCC)     Leg weakness, bilateral     Type 2 diabetes mellitus with neurological manifestations, uncontrolled ([address]     Complicated UTI (urinary tract infection)     Essential hypertension     Severe muscle deconditioning     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 (HCC)     Frequent falls     Chronic kidney disease, stage 3a ([address]     Uncontrolled type 2 diabetes mellitus with peripheral neuropathy (HCC)     Prostate cancer ([address]     Suprapubic catheter ([address]     Sepsis secondary to UTI ([address]      Plan:     1.  Reviewed POC blood glucose . Labs and X ray results   2. Reviewed Current Medicines   3. On meal/ Correction bolus Humalog/ Basal Lantus Insulin regime / and   4. Monitor Blood glucose frequently   5. Modified  the dose of Insulin/ other medicines as needed  6. Ordered ultrasound of thyroid gland other thyroid function  7. Will start on low-dose Synthroid of 50 mg/day  8. Waiting for insurance approval for transfer to skilled nursing unit  9. Will follow     .      Salvador Santana MD, MD

## 2022-03-26 NOTE — PROGRESS NOTES
[facility]  consulted by Dr. Ryan Avila for monitoring and adjustment. Indication for treatment: Possible bacteremia  Goal trough: [] 10-15 mcg/mL or [x] 15-20 mcg/ml  AUC/USMAN: [] <500 or [x] 400-600    Pertinent Laboratory Values:   Temp Readings from Last 3 Encounters:   03/26/22 97.5 °F (36.4 °C) (Oral)   01/06/22 99.3 °F (37.4 °C) (Oral)   11/28/21 98.1 °F (36.7 °C) (Oral)     Recent Labs     03/24/22  1105 03/25/22  1027   WBC 8.4 8.9     Recent Labs     03/24/22  1105 03/25/22  1027   BUN 38* 39*   CREATININE 2.2* 2.5*     Estimated Creatinine Clearance: 25 mL/min (A) (based on SCr of 2.5 mg/dL (H)). Intake/Output Summary (Last 24 hours) at 3/26/2022 1618  Last data filed at 3/26/2022 0831  Gross per 24 hour   Intake 250 ml   Output 1100 ml   Net -850 ml       Pertinent Cultures:  Date    Source    Results  3/23   Blood    Staph coag neg (2/4)    Vancomycin level:   TROUGH:  No results for input(s): VANCOTROUGH in the last 72 hours.   RANDOM:    Recent Labs     03/25/22  1027   VANCORANDOM 21.6       Assessment:  · SCr, BUN, and urine output:   · PAMELLA on CKD IV  · Day(s) of therapy: 3  · Vancomycin concentration: to be collected    Plan:  · Intermittent vancomycin dosing based on levels while in PAMELLA  · Level this AM was not collected  · Anticipate interval >24h  · Hold vancomycin this evening  · Repeat next level tomorrow AM  · Pharmacy will continue to monitor patient and adjust therapy as indicated    VANCOMYCIN CONCENTRATION SCHEDULED FOR 3/27 @ 0600    Thank you for the consult,  TALHA Cruz [facility], PharmD  3/26/2022 4:18 PM

## 2022-03-26 NOTE — PLAN OF CARE
Problem: Nutritional:  Goal: Ability to tolerate tube feedings without aspirating will improve  Description: Ability to tolerate tube feedings without aspirating will improve  Outcome: Ongoing  Goal: Consumption of food in small portions  Description: Consumption of food in small portions  Outcome: Ongoing  Goal: Consumption of liquid of appropriate consistency  Description: Consumption of liquid of appropriate consistency  Outcome: Ongoing     Problem: Respiratory:  Goal: Ability to maintain a clear airway will improve  Description: Ability to maintain a clear airway will improve  Outcome: Ongoing  Goal: Will remain free from infection  Description: Will remain free from infection  Outcome: Ongoing  Goal: Absence of aspiration  Description: Absence of aspiration  Outcome: Ongoing     Problem: Safety:  Goal: Ability to chew and swallow food without choking will improve  Description: Ability to chew and swallow food without choking will improve  Outcome: Ongoing  Goal: Ability to demonstrate good, daily oral hygiene techniques will improve  Description: Ability to demonstrate good, daily oral hygiene techniques will improve  Outcome: Ongoing  Goal: Maintenance of upright position during and after feeding  Description: Maintenance of upright position during and after feeding  Outcome: Ongoing     Problem: Skin Integrity:  Goal: Will show no infection signs and symptoms  Description: Will show no infection signs and symptoms  Outcome: Ongoing  Goal: Absence of new skin breakdown  Description: Absence of new skin breakdown  Outcome: Ongoing     Problem: Falls - Risk of:  Goal: Will remain free from falls  Description: Will remain free from falls  Outcome: Ongoing  Goal: Absence of physical injury  Description: Absence of physical injury  Outcome: Ongoing     Problem: Pain:  Goal: Pain level will decrease  Description: Pain level will decrease  Outcome: Ongoing  Goal: Control of acute pain  Description: Control of acute pain  Outcome: Ongoing  Goal: Control of chronic pain  Description: Control of chronic pain  Outcome: Ongoing     Problem: Nutrition  Goal: Optimal nutrition therapy  Outcome: Ongoing

## 2022-03-27 PROBLEM — B95.7 COAGULASE NEGATIVE STAPHYLOCOCCUS BACTEREMIA: Status: ACTIVE | Noted: 2022-03-27

## 2022-03-27 PROBLEM — G93.41 ACUTE METABOLIC ENCEPHALOPATHY: Status: ACTIVE | Noted: 2022-03-27

## 2022-03-27 PROBLEM — R78.81 COAGULASE NEGATIVE STAPHYLOCOCCUS BACTEREMIA: Status: ACTIVE | Noted: 2022-03-27

## 2022-03-27 PROBLEM — I47.10 SVT (SUPRAVENTRICULAR TACHYCARDIA): Status: ACTIVE | Noted: 2022-03-27

## 2022-03-27 PROBLEM — I47.1 SVT (SUPRAVENTRICULAR TACHYCARDIA) (HCC): Status: ACTIVE | Noted: 2022-03-27

## 2022-03-27 PROBLEM — N18.4 CHRONIC KIDNEY DISEASE (CKD), STAGE IV (SEVERE) (HCC): Status: ACTIVE | Noted: 2022-03-27

## 2022-03-27 LAB
ANION GAP SERPL CALCULATED.3IONS-SCNC: 15 MMOL/L (ref 4–16)
BASOPHILS ABSOLUTE: 0 K/CU MM
BASOPHILS RELATIVE PERCENT: 0.4 % (ref 0–1)
BUN BLDV-MCNC: 45 MG/DL (ref 6–23)
CALCIUM SERPL-MCNC: 8.6 MG/DL (ref 8.3–10.6)
CHLORIDE BLD-SCNC: 99 MMOL/L (ref 99–110)
CO2: 21 MMOL/L (ref 21–32)
CREAT SERPL-MCNC: 2 MG/DL (ref 0.9–1.3)
CULTURE: ABNORMAL
DIFFERENTIAL TYPE: ABNORMAL
DOSE AMOUNT: NORMAL
DOSE TIME: NORMAL
EOSINOPHILS ABSOLUTE: 0.7 K/CU MM
EOSINOPHILS RELATIVE PERCENT: 6.9 % (ref 0–3)
GFR AFRICAN AMERICAN: 39 ML/MIN/1.73M2
GFR NON-AFRICAN AMERICAN: 32 ML/MIN/1.73M2
GLUCOSE BLD-MCNC: 181 MG/DL (ref 70–99)
GLUCOSE BLD-MCNC: 220 MG/DL (ref 70–99)
GLUCOSE BLD-MCNC: 250 MG/DL (ref 70–99)
GLUCOSE BLD-MCNC: 309 MG/DL (ref 70–99)
HCT VFR BLD CALC: 26.1 % (ref 42–52)
HEMOGLOBIN: 8.3 GM/DL (ref 13.5–18)
IMMATURE NEUTROPHIL %: 0.5 % (ref 0–0.43)
LYMPHOCYTES ABSOLUTE: 1.6 K/CU MM
LYMPHOCYTES RELATIVE PERCENT: 16.3 % (ref 24–44)
Lab: ABNORMAL
Lab: ABNORMAL
MCH RBC QN AUTO: 29.7 PG (ref 27–31)
MCHC RBC AUTO-ENTMCNC: 31.8 % (ref 32–36)
MCV RBC AUTO: 93.5 FL (ref 78–100)
MONOCYTES ABSOLUTE: 0.7 K/CU MM
MONOCYTES RELATIVE PERCENT: 7 % (ref 0–4)
NUCLEATED RBC %: 0 %
PDW BLD-RTO: 15.4 % (ref 11.7–14.9)
PLATELET # BLD: 489 K/CU MM (ref 140–440)
PMV BLD AUTO: 10.9 FL (ref 7.5–11.1)
POTASSIUM SERPL-SCNC: 4.6 MMOL/L (ref 3.5–5.1)
RBC # BLD: 2.79 M/CU MM (ref 4.6–6.2)
SEGMENTED NEUTROPHILS ABSOLUTE COUNT: 6.6 K/CU MM
SEGMENTED NEUTROPHILS RELATIVE PERCENT: 68.9 % (ref 36–66)
SODIUM BLD-SCNC: 135 MMOL/L (ref 135–145)
SPECIMEN: ABNORMAL
SPECIMEN: ABNORMAL
T4 FREE: 1.15 NG/DL (ref 0.9–1.8)
TOTAL IMMATURE NEUTOROPHIL: 0.05 K/CU MM
TOTAL NUCLEATED RBC: 0 K/CU MM
VANCOMYCIN RANDOM: 17 UG/ML
WBC # BLD: 9.6 K/CU MM (ref 4–10.5)

## 2022-03-27 PROCEDURE — 6370000000 HC RX 637 (ALT 250 FOR IP): Performed by: INTERNAL MEDICINE

## 2022-03-27 PROCEDURE — 6360000002 HC RX W HCPCS: Performed by: HOSPITALIST

## 2022-03-27 PROCEDURE — 99232 SBSQ HOSP IP/OBS MODERATE 35: CPT | Performed by: INTERNAL MEDICINE

## 2022-03-27 PROCEDURE — 6370000000 HC RX 637 (ALT 250 FOR IP): Performed by: NURSE PRACTITIONER

## 2022-03-27 PROCEDURE — 6370000000 HC RX 637 (ALT 250 FOR IP): Performed by: HOSPITALIST

## 2022-03-27 PROCEDURE — APPSS45 APP SPLIT SHARED TIME 31-45 MINUTES: Performed by: NURSE PRACTITIONER

## 2022-03-27 PROCEDURE — 86800 THYROGLOBULIN ANTIBODY: CPT

## 2022-03-27 PROCEDURE — 82962 GLUCOSE BLOOD TEST: CPT

## 2022-03-27 PROCEDURE — 80202 ASSAY OF VANCOMYCIN: CPT

## 2022-03-27 PROCEDURE — 85025 COMPLETE CBC W/AUTO DIFF WBC: CPT

## 2022-03-27 PROCEDURE — 6370000000 HC RX 637 (ALT 250 FOR IP): Performed by: FAMILY MEDICINE

## 2022-03-27 PROCEDURE — 6360000002 HC RX W HCPCS: Performed by: INTERNAL MEDICINE

## 2022-03-27 PROCEDURE — 80048 BASIC METABOLIC PNL TOTAL CA: CPT

## 2022-03-27 PROCEDURE — 1200000000 HC SEMI PRIVATE

## 2022-03-27 PROCEDURE — 84439 ASSAY OF FREE THYROXINE: CPT

## 2022-03-27 PROCEDURE — 36415 COLL VENOUS BLD VENIPUNCTURE: CPT

## 2022-03-27 PROCEDURE — 94761 N-INVAS EAR/PLS OXIMETRY MLT: CPT

## 2022-03-27 PROCEDURE — 2580000003 HC RX 258: Performed by: HOSPITALIST

## 2022-03-27 PROCEDURE — 2580000003 HC RX 258: Performed by: INTERNAL MEDICINE

## 2022-03-27 RX ORDER — CARVEDILOL 3.12 MG/1
3.12 TABLET ORAL 2 TIMES DAILY WITH MEALS
Status: DISCONTINUED | OUTPATIENT
Start: 2022-03-27 | End: 2022-03-30 | Stop reason: HOSPADM

## 2022-03-27 RX ORDER — DOXYCYCLINE HYCLATE 100 MG
100 TABLET ORAL EVERY 12 HOURS SCHEDULED
Status: DISCONTINUED | OUTPATIENT
Start: 2022-03-27 | End: 2022-03-30 | Stop reason: HOSPADM

## 2022-03-27 RX ORDER — INSULIN GLARGINE 100 [IU]/ML
20 INJECTION, SOLUTION SUBCUTANEOUS NIGHTLY
Status: DISCONTINUED | OUTPATIENT
Start: 2022-03-27 | End: 2022-03-30 | Stop reason: HOSPADM

## 2022-03-27 RX ORDER — PROPRANOLOL HYDROCHLORIDE 10 MG/1
10 TABLET ORAL 3 TIMES DAILY
Status: DISCONTINUED | OUTPATIENT
Start: 2022-03-27 | End: 2022-03-27

## 2022-03-27 RX ORDER — HYDRALAZINE HYDROCHLORIDE 25 MG/1
25 TABLET, FILM COATED ORAL EVERY 8 HOURS SCHEDULED
Status: DISCONTINUED | OUTPATIENT
Start: 2022-03-27 | End: 2022-03-30

## 2022-03-27 RX ORDER — ALOGLIPTIN 12.5 MG/1
6.25 TABLET, FILM COATED ORAL DAILY
Status: DISCONTINUED | OUTPATIENT
Start: 2022-03-27 | End: 2022-03-30 | Stop reason: HOSPADM

## 2022-03-27 RX ADMIN — CEPHALEXIN 500 MG: 500 CAPSULE ORAL at 00:55

## 2022-03-27 RX ADMIN — DOXYCYCLINE HYCLATE 100 MG: 100 TABLET, COATED ORAL at 10:30

## 2022-03-27 RX ADMIN — PROPRANOLOL HYDROCHLORIDE 10 MG: 10 TABLET ORAL at 10:26

## 2022-03-27 RX ADMIN — VANCOMYCIN HYDROCHLORIDE 1250 MG: 5 INJECTION, POWDER, LYOPHILIZED, FOR SOLUTION INTRAVENOUS at 14:01

## 2022-03-27 RX ADMIN — HEPARIN SODIUM 5000 UNITS: 5000 INJECTION INTRAVENOUS; SUBCUTANEOUS at 14:02

## 2022-03-27 RX ADMIN — ATORVASTATIN CALCIUM 40 MG: 40 TABLET, FILM COATED ORAL at 23:19

## 2022-03-27 RX ADMIN — GLIPIZIDE 10 MG: 5 TABLET ORAL at 07:21

## 2022-03-27 RX ADMIN — CEPHALEXIN 500 MG: 500 CAPSULE ORAL at 07:24

## 2022-03-27 RX ADMIN — CEPHALEXIN 500 MG: 500 CAPSULE ORAL at 16:53

## 2022-03-27 RX ADMIN — ATORVASTATIN CALCIUM 40 MG: 40 TABLET, FILM COATED ORAL at 00:51

## 2022-03-27 RX ADMIN — PREGABALIN 50 MG: 50 CAPSULE ORAL at 23:34

## 2022-03-27 RX ADMIN — ALOGLIPTIN 6.25 MG: 12.5 TABLET, FILM COATED ORAL at 12:21

## 2022-03-27 RX ADMIN — PREGABALIN 50 MG: 50 CAPSULE ORAL at 10:26

## 2022-03-27 RX ADMIN — HEPARIN SODIUM 5000 UNITS: 5000 INJECTION INTRAVENOUS; SUBCUTANEOUS at 00:59

## 2022-03-27 RX ADMIN — HEPARIN SODIUM 5000 UNITS: 5000 INJECTION INTRAVENOUS; SUBCUTANEOUS at 23:09

## 2022-03-27 RX ADMIN — ALLOPURINOL 100 MG: 100 TABLET ORAL at 10:26

## 2022-03-27 RX ADMIN — CEPHALEXIN 500 MG: 500 CAPSULE ORAL at 10:27

## 2022-03-27 RX ADMIN — MIRTAZAPINE 15 MG: 15 TABLET, ORALLY DISINTEGRATING ORAL at 00:51

## 2022-03-27 RX ADMIN — DOXYCYCLINE HYCLATE 100 MG: 100 TABLET, COATED ORAL at 23:34

## 2022-03-27 RX ADMIN — ASPIRIN 81 MG CHEWABLE TABLET 81 MG: 81 TABLET CHEWABLE at 10:26

## 2022-03-27 RX ADMIN — SODIUM CHLORIDE, PRESERVATIVE FREE 10 ML: 5 INJECTION INTRAVENOUS at 23:28

## 2022-03-27 RX ADMIN — CARVEDILOL 3.12 MG: 3.12 TABLET, FILM COATED ORAL at 12:21

## 2022-03-27 RX ADMIN — HYDRALAZINE HYDROCHLORIDE 25 MG: 25 TABLET, FILM COATED ORAL at 23:08

## 2022-03-27 RX ADMIN — PREGABALIN 50 MG: 50 CAPSULE ORAL at 00:51

## 2022-03-27 RX ADMIN — LEVOTHYROXINE SODIUM 50 MCG: 0.05 TABLET ORAL at 07:20

## 2022-03-27 RX ADMIN — MIRTAZAPINE 15 MG: 15 TABLET, ORALLY DISINTEGRATING ORAL at 23:08

## 2022-03-27 RX ADMIN — CEPHALEXIN 500 MG: 500 CAPSULE ORAL at 23:08

## 2022-03-27 RX ADMIN — POLYETHYLENE GLYCOL (3350) 17 G: 17 POWDER, FOR SOLUTION ORAL at 10:27

## 2022-03-27 RX ADMIN — INSULIN GLARGINE 15 UNITS: 100 INJECTION, SOLUTION SUBCUTANEOUS at 00:55

## 2022-03-27 RX ADMIN — OXYCODONE HYDROCHLORIDE 5 MG: 5 TABLET ORAL at 07:20

## 2022-03-27 RX ADMIN — INSULIN GLARGINE 20 UNITS: 100 INJECTION, SOLUTION SUBCUTANEOUS at 23:09

## 2022-03-27 RX ADMIN — CARVEDILOL 3.12 MG: 3.12 TABLET, FILM COATED ORAL at 16:53

## 2022-03-27 RX ADMIN — HEPARIN SODIUM 5000 UNITS: 5000 INJECTION INTRAVENOUS; SUBCUTANEOUS at 07:20

## 2022-03-27 RX ADMIN — GLIPIZIDE 10 MG: 5 TABLET ORAL at 16:53

## 2022-03-27 RX ADMIN — SODIUM CHLORIDE, PRESERVATIVE FREE 10 ML: 5 INJECTION INTRAVENOUS at 10:27

## 2022-03-27 ASSESSMENT — PAIN SCALES - GENERAL
PAINLEVEL_OUTOF10: 0
PAINLEVEL_OUTOF10: 7
PAINLEVEL_OUTOF10: 0

## 2022-03-27 NOTE — PROGRESS NOTES
ATTENDING PHYSICIAN'S PROGRESS NOTES    Patient:  Dom Wilburn      Unit/Bed:1124/1124-A    YOB: 1940    MRN: [id number]     Acct: [de-identified]     Admit date: 3/16/2022    Patient Seen, Chart, Consults notes, Labs, Radiology studies reviewed. SUBJECTIVE:   Day 10 of stay with UTI with sepsis, and subsequently developed an SVT, also found to have PAMELLA on baseline CKD stage IV and most recent (in last 24 hours) has had some improvement in his presentation, sitting up in bed, alert and oriented to self and place but not to time. Culture results noted. Patient complains of back pain but denies any fever or chills. All other ROS negative except noted in HPI    Past, Family, Social History unchanged from admission. Diet:  ADULT ORAL NUTRITION SUPPLEMENT; Lunch, Dinner; Frozen Oral Supplement  ADULT DIET;  Regular; 5 carb choices (75 gm/meal)  ADULT ORAL NUTRITION SUPPLEMENT; Breakfast; Fortified Pudding Oral Supplement    Medications:  Scheduled Meds:   propranolol  10 mg Oral TID    doxycycline hyclate  100 mg Oral 2 times per day    glipiZIDE  10 mg Oral BID AC    insulin glargine  15 Units SubCUTAneous Nightly    levothyroxine  50 mcg Oral Daily    insulin lispro  0-12 Units SubCUTAneous TID WC    insulin lispro  0-6 Units SubCUTAneous Nightly    mirtazapine  15 mg Oral Nightly    [Held by provider] chlorthalidone  25 mg Oral Daily    vancomycin (VANCOCIN) intermittent dosing (placeholder)   Other RX Placeholder    aspirin  81 mg Oral Daily    cephALEXin  500 mg Oral 4 times per day    atorvastatin  40 mg Oral Nightly    polyethylene glycol  17 g Oral Daily    sodium chloride flush  5-40 mL IntraVENous 2 times per day    heparin (porcine)  5,000 Units SubCUTAneous 3 times per day    allopurinol  100 mg Oral Daily    pregabalin  50 mg Oral BID     Continuous Infusions:   dextrose      sodium chloride 25 mL (03/24/22 6234)     PRN Meds:oxyCODONE, glucose, glucagon provided for review. Dose modulation, iterative reconstruction, and/or weight based adjustment of the mA/kV was utilized to reduce the radiation dose to as low as reasonably achievable. COMPARISON: 09/12/2020 HISTORY: ORDERING SYSTEM PROVIDED HISTORY: UTI with altered mental status and history of renal stones TECHNOLOGIST PROVIDED HISTORY: Reason for exam:->UTI with altered mental status and history of renal stones Additional Contrast?->None Decision Support Exception - unselect if not a suspected or confirmed emergency medical condition->Emergency Medical Condition (MA) Reason for Exam: UTI with altered mental status and history of renal stones FINDINGS: Lower Chest: Calcified lymph nodes in the right hilar region. Atelectatic or fibrotic changes along the posterior lungs. Organs: Lack of IV contrast does reduce the evaluation of the organs and vasculature. There is streak artifact from the arms at the sides. No obvious masses seen within the liver. The gallbladder is distended and may have a tiny gallstone in the lumen. There is no fat stranding of the gallbladder fossa. The spleen is not enlarged but does have multiple calcified granulomas. No pancreatic calcification or ductal dilatation. There is stranding and edema adjacent to the pancreatic tail but appears more associated with the left kidney. The adrenal glands are not enlarged. The right kidney has mild perinephric stranding no hydronephrosis or hydroureter. The left kidney does have asymmetric edema and increased perinephric stranding. Small amount of fluid are seen in the pararenal space. A left ureteral stent is present coursing down into the urinary bladder. No focal calcifications are seen along the course of the stent. However there is still the left-sided hydronephrosis and hydroureter. GI/Bowel: The stomach, small bowel, and colon are not dilated. There is a small hiatal hernia with mild thickening of the distal esophagus.  Constipation and gaseous distension of the ascending and transverse colon. Appendix is identified and no focal appendicitis. There is no pneumoperitoneum. Pelvis: The urinary bladder is decompressed with a suprapubic catheter. Cannot accurately evaluate the urinary bladder wall. The distal coil of the left ureteral stent is in the inferior aspect versus is an expanded upper portion of the urethral junction. 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 Peritoneum/Retroperitoneum: No abdominal aortic aneurysm but does have moderate calcification. There is no retroperitoneal hematoma. Small left periaortic lymph nodes were present on the previous exam as well. Mild increase in size may be reactive to the left kidney. Bones/Soft Tissues: Degenerative changes in the disc spaces, facet joints, and sacroiliac joints. Large Schmorl node in the inferior endplate of X26 is increased in size. Smaller Schmorl nodes along the lumbar endplates appear stable. 1.  There is left-sided hydronephrosis and hydroureter despite the presence of the left ureteral stent. Increased left perinephric stranding and fluid compared to the right side. Will need to correlate for functionality of the stent versus ascending urinary tract infection. 2.  The urinary bladder is collapsed around the suprapubic catheter. The distal coil of the ureteral stent courses into the urinary bladder along the inferior aspect or an expanded upper portion of the urethral junction. Previous prostate surgery is again noted. 3.  Constipation in the ascending and transverse colon. No small bowel obstruction or pneumoperitoneum. 4.  Mild thickening of the distal esophagus may relate to reflux esophagitis.      CT HEAD WO CONTRAST    Result Date: 3/17/2022  EXAMINATION: CT OF THE HEAD WITHOUT CONTRAST  3/17/2022 12:19 am TECHNIQUE: CT of the head was performed without the administration of intravenous contrast. Dose modulation, iterative reconstruction, and/or weight based adjustment of the mA/kV was utilized to reduce the radiation dose to as low as reasonably achievable. COMPARISON: 11/16/2021 HISTORY: ORDERING SYSTEM PROVIDED HISTORY: AMS TECHNOLOGIST PROVIDED HISTORY: Reason for exam:->AMS Has a \"code stroke\" or \"stroke alert\" been called? ->No Decision Support Exception - unselect if not a suspected or confirmed emergency medical condition->Emergency Medical Condition (MA) Reason for Exam: AMS FINDINGS: BRAIN/VENTRICLES: There is no acute intracranial hemorrhage, mass effect or midline shift. No abnormal extra-axial fluid collection. The gray-white differentiation is maintained without evidence of an acute infarct. Hypoattenuation of the periventricular and subcortical white matter is suggestive of chronic small vessel ischemic disease. Mild diffuse parenchymal volume loss is noted. There is no evidence of hydrocephalus. ORBITS: The bilateral globes are intact. SINUSES: Mucoperiosteal thickening of the bilateral ethmoid air cells is noted. Other visualized paranasal sinuses and mastoid air cells are clear. SOFT TISSUES/SKULL:  No acute abnormality of the visualized skull or soft tissues. No acute intracranial abnormality. MRI may be obtained if clinically indicated. FL LESS THAN 1 HOUR    Result Date: 3/17/2022  Radiology exam is complete. No Radiologist dictation. Please follow up with ordering provider. XR CHEST PORTABLE    Result Date: 3/17/2022  EXAMINATION: ONE XRAY VIEW OF THE CHEST 3/16/2022 11:48 pm COMPARISON: 01/06/2022 HISTORY: ORDERING SYSTEM PROVIDED HISTORY: fever TECHNOLOGIST PROVIDED HISTORY: Reason for exam:->fever Reason for Exam: FEVER FINDINGS: The cardiac silhouette is at the upper limits of normal in size. Vascular calcifications are noted along the aortic arch. Pulmonary edema is increasing. No focal lung infiltrate. No pleural effusion or pneumothorax. The bones are osteopenic.      1. Worsening pulmonary edema. Physical Exam:  Vitals: BP (!) 142/76   Pulse 82   Temp 97.6 °F (36.4 °C) (Oral)   Resp 16   Ht 6' 0.01\" (1.829 m)   Wt 204 lb 8 oz (92.8 kg)   SpO2 100%   BMI 27.73 kg/m²   24 hour intake/output:  No intake or output data in the 24 hours ending 03/27/22 1011  Last 3 weights: Wt Readings from Last 3 Encounters:   03/19/22 204 lb 8 oz (92.8 kg)   01/06/22 210 lb (95.3 kg)   11/28/21 210 lb (95.3 kg)       General appearance - acyanotic, in no respiratory distress, well hydrated and chronically ill appearing  HEENT: Normocephalic, Atraumatic, Conjuctiva pink, PERRL, Oral mucosa normal, Lips, teeth and gums normal, Trachea midline, Thyroid normal and No noted lymphadenopathy  Chest - clear to auscultation, no wheezes, rales or rhonchi, symmetric air entry  Cardiovascular - normal rate, regular rhythm, normal S1, S2, no murmurs, rubs, clicks or gallops  Abdomen - soft, nontender, nondistended, no masses or organomegaly   Neurological -awake, alert and oriented in no apparent distress, No focal findings or movement disorder noted and Motor and sensory grossly normal bilaterally  Integumentary - Skin color, texture, turgor normal. No Rashes or lesions  Musculoskeletal -Full ROM times 4 extremities, No clubbing or cyanosis and No peripheral edema      DVT prophylaxis: [] Lovenox                                 [] SCDs                                 [x] SQ Heparin                                 [] Encourage ambulation           [] Already on Anticoagulation                 ASSESSMENT / PLAN :    Principal Problem:    Sepsis secondary to UTI ([address]  Blood cultures on 3/17 growing Klebsiella pneumonia and patient is on Keflex which is sensitive. We will continue to monitor closely and may repeat the UA with culture to establish clearance. SVT (supraventricular tachycardia) ([address]  Patient being maintained on low-dose metoprolol with heart rate well controlled.   Patient being followed closely with further recommendations and management by cardiology consult. Active Problems:    Diabetes mellitus ([facility]  Being managed by endocrinologist      Acute kidney failure ([facility] on baseline Chronic kidney disease (CKD), stage IV (severe) ([facility]  Patient is being closely monitored and managed by nephrology consult with further recommendations. 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 ([facility]  Continue with patient's Lipitor dose of 40 mg nightly. Coagulase negative Staphylococcus bacteremia  Final culture results showing Staphylococcus Capitis, sensitive to doxycycline and therefore has been added to his regimen. .      Acute metabolic encephalopathy  Markedly improved with patient alert and oriented to self and place but not to time, giving meaningful history and following commands. Will continue with current management and patient will benefit from placement in the [facility] for rehabilitation and recuperation. Resolved Problems:    * No resolved hospital problems. *    Management as outlined above, continue with current regimen and monitor closely for control, will be maintaining patient in-house overnight for discharge whenever pre-CERT is approved.   A.m. labs    Electronically signed by Juan Alberto Fischer MD on 3/27/2022 at 10:11 AM    [facility] Hospital[facility]

## 2022-03-27 NOTE — PROGRESS NOTES
Progress Note( Dr. Duke Morocho)  3/27/2022  Subjective:   Admit Date: 3/16/2022  PCP: Junnie Hashimoto, MD    Admitted For :Sepsis from UTI generalized  weakness    Consulted For: Better control of blood glucose    Interval History: feels better  Still feels weak  Lab review the patient has mild hypothyroidism elevated TSH level    Denies any chest pains,   Denies SOB . Denies nausea or vomiting. No new bowel or bladder symptoms. No intake or output data in the 24 hours ending 03/27/22 1044    DATA    CBC:   Recent Labs     03/24/22  1105 03/25/22  1027 03/27/22  0822   WBC 8.4 8.9 9.6   HGB 8.1* 8.9* 8.3*    400 489*    CMP:  Recent Labs     03/24/22  1105 03/25/22  1027    140   K 4.7 5.2*    99   CO2 23 18*   BUN 38* 39*   CREATININE 2.2* 2.5*   CALCIUM 9.2 9.0     Lipids: No results found for: CHOL, HDL, TRIG  Glucose:  Recent Labs     03/26/22  1602 03/26/22  2117 03/27/22  0723   POCGLU 226* 233* 250*     [id number]:  Lab Results   Component Value Date    LABA1C 9.2 03/22/2022     High Sensitivity TSH:   Lab Results   Component Value Date    TSHHS 6.190 03/25/2022     Free T3: No results found for: FT3  Free T4:No results found for: T4FREE    US HEAD NECK SOFT TISSUE THYROID   Final Result   Mild changes of chronic thyroiditis. CT HEAD WO CONTRAST   Final Result   1. No acute intracranial abnormality. 2. Stable moderate chronic white matter microvascular ischemic changes and   chronic lacunar infarct in the right basal ganglia. VL DUP LOWER EXTREMITY VENOUS BILATERAL   Final Result   No evidence of DVT in either lower extremity. XR HIP 2-3 VW W PELVIS LEFT   Final Result   No acute abnormality or arthropathy of the hip. CT ABDOMEN PELVIS WO CONTRAST Additional Contrast? None   Final Result   1. Well-positioned left ureteric stent with no evidence of hydronephrosis,   renal abscess or other acute abnormality. Normal right kidney.    2. Minimal atelectatic changes in both lower lobes. 3. Well-positioned suprapubic catheter in the urinary bladder. FL LESS THAN 1 HOUR   Final Result      CT ABDOMEN PELVIS WO CONTRAST Additional Contrast? None   Final Result   1. There is left-sided hydronephrosis and hydroureter despite the presence   of the left ureteral stent. Increased left perinephric stranding and fluid   compared to the right side. Will need to correlate for functionality of the   stent versus ascending urinary tract infection. 2.  The urinary bladder is collapsed around the suprapubic catheter. The   distal coil of the ureteral stent courses into the urinary bladder along the   inferior aspect or an expanded upper portion of the urethral junction. Previous prostate surgery is again noted. 3.  Constipation in the ascending and transverse colon. No small bowel   obstruction or pneumoperitoneum. 4.  Mild thickening of the distal esophagus may relate to reflux esophagitis. CT HEAD WO CONTRAST   Final Result   No acute intracranial abnormality. MRI may be obtained if clinically   indicated. XR CHEST PORTABLE   Final Result   1. Worsening pulmonary edema.               Scheduled Medicines   Medications:    propranolol  10 mg Oral TID    doxycycline hyclate  100 mg Oral 2 times per day    alogliptin  6.25 mg Oral Daily    insulin glargine  20 Units SubCUTAneous Nightly    insulin lispro  0-12 Units SubCUTAneous Nightly    glipiZIDE  10 mg Oral BID AC    levothyroxine  50 mcg Oral Daily    insulin lispro  0-12 Units SubCUTAneous TID WC    mirtazapine  15 mg Oral Nightly    [Held by provider] chlorthalidone  25 mg Oral Daily    vancomycin (VANCOCIN) intermittent dosing (placeholder)   Other RX Placeholder    aspirin  81 mg Oral Daily    cephALEXin  500 mg Oral 4 times per day    atorvastatin  40 mg Oral Nightly    polyethylene glycol  17 g Oral Daily    sodium chloride flush  5-40 mL IntraVENous 2 times per day    heparin (porcine)  5,000 Units SubCUTAneous 3 times per day    allopurinol  100 mg Oral Daily    pregabalin  50 mg Oral BID      Infusions:    dextrose      sodium chloride 25 mL (03/24/22 9821)         Objective:   Vitals: BP (!) 160/73   Pulse 84   Temp 98.8 °F (37.1 °C) (Oral)   Resp 18   Ht 6' 0.01\" (1.829 m)   Wt 204 lb 8 oz (92.8 kg)   SpO2 100%   BMI 27.73 kg/m²   General appearance: alert and cooperative with exam  Neck: no JVD or bruit  Thyroid : Normal lobes   Lungs: Has Vesicular Breath sounds   Heart:  regular rate and rhythm  Abdomen: soft, non-tender; bowel sounds normal; no masses,  no organomegaly has Surapubic Catheter   Musculoskeletal: Normal  Extremities: extremities normal, , no edema  Neurologic:  Awake, alert, oriented to name, place and time. Cranial nerves II-XII are grossly intact. Motor weakness . Sensory is intact. ,  and gait is abnormal.unstable     Assessment:     Patient Active Problem List:     Gait disturbance     Generalized weakness     Diabetes mellitus (HCC)     Osteoarthritis     Anemia of chronic disorder     Infected implanted bladder sphincter cuff     Escherichia coli urinary tract infection     Hypertension     Sepsis ([address]     Type 2 diabetes mellitus with hypoglycemia without coma ([address]     UTI (urinary tract infection) due to urinary indwelling catheter (HCC)     Hyperkalemia     Acute kidney failure (HCC)     Leg weakness, bilateral     Type 2 diabetes mellitus with neurological manifestations, uncontrolled ([address]     Complicated UTI (urinary tract infection)     Essential hypertension     Severe muscle deconditioning     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 (HCC)     Frequent falls     Chronic kidney disease, stage 3a ([address]     Uncontrolled type 2 diabetes mellitus with peripheral neuropathy (HCC)     Prostate cancer ([address]     Suprapubic catheter ([address]     Sepsis secondary to UTI ([address]      Plan:     1.  Reviewed POC blood glucose . Labs and X ray results   2. Reviewed Current Medicines   3. On meal/ Correction bolus Humalog/ Basal Lantus Insulin regime / and   4. Monitor Blood glucose frequently   5. Modified  the dose of Insulin/ other medicines as needed  6. Ordered ultrasound of thyroid gland other thyroid function  7. On low-dose Synthroid of 50 mg/day  8. Waiting for insurance approval for transfer to skilled nursing unit  9. Will follow     .      Cirilo Flores MD, MD

## 2022-03-27 NOTE — PROGRESS NOTES
Cardiology Note    Today's Plan: continue current medical management. Admit Date:  3/16/2022    Admission diagnosis / Complaint: ? Tachycardia      Subjective:  Mr. Wenceslao Emanuel is resting in bed. He states he is doing well. He is not thinking he will be able to go home yet. He just doesn't feel he is ready. He denies palpations. Tele tech reports no additional Tachycardia since night before last.     Assessment and Plan:  1. SVT: no additional episodes of tachycardia this morning. Change from metoprolol to coreg 3.125 to assist with HTN. 2. Hypertension: uncontrolled. On norvasc. Restart home dose hydralazine. Will see how he tolerates coreg. Goal blood pressure < 130/80. Echo 7/2021 @ [facility]:   1. Left ventricle: The cavity size was normal. There was mild      concentric hypertrophy. Systolic function was normal. The      estimated ejection fraction was in the range of 55% to 60%. Left      ventricular diastolic function parameters were normal for the      patient's age. 2. Right ventricle: The cavity size was normal. Wall thickness was      normal. Systolic function was mildly reduced. 3. Atrial septum: Agitated saline study showed no shunt, at      baseline or with provocation. 4. Pulmonary arteries: Systolic pressure was within the normal      range. Estimated PA peak pressure is 26mm Hg (S). 5. Pericardium, extracardiac: There was no pericardial effusion.        Objective:   BP (!) 142/76   Pulse 82   Temp 97.6 °F (36.4 °C) (Oral)   Resp 16   Ht 6' 0.01\" (1.829 m)   Wt 204 lb 8 oz (92.8 kg)   SpO2 100%   BMI 27.73 kg/m²       Intake/Output Summary (Last 24 hours) at 3/27/2022 5429  Last data filed at 3/26/2022 0831  Gross per 24 hour   Intake 245 ml   Output --   Net 245 ml       TELEMETRY: Sinus      has a past medical history of Acute urinary tract infection, Ataxia, Diabetes mellitus ([address] Fusion of spine of cervical region, Gait disturbance, History of prostate cancer, History of tobacco use, Hyperlipidemia, and Osteoarthritis. has a past surgical history that includes Prostate surgery; other surgical history (3/28/13); Colon surgery; Bladder surgery; Prostatectomy (1996); and Bladder surgery (Left, 3/17/2022). Physical Exam  Vitals reviewed. Constitutional:       General: He is not in acute distress. Appearance: Normal appearance. He is obese. He is not ill-appearing. HENT:      Head: Atraumatic. Neck:      Vascular: No carotid bruit. Cardiovascular:      Rate and Rhythm: Normal rate and regular rhythm. Pulses: Normal pulses. Heart sounds: Normal heart sounds. No murmur heard. Pulmonary:      Effort: Pulmonary effort is normal. No respiratory distress. Breath sounds: Normal breath sounds. Musculoskeletal:         General: No deformity. Cervical back: Neck supple. No muscular tenderness. Right lower leg: Edema present. Left lower leg: Edema present. Neurological:      Mental Status: He is alert.           Medications:    glipiZIDE  10 mg Oral BID AC    insulin glargine  15 Units SubCUTAneous Nightly    levothyroxine  50 mcg Oral Daily    insulin lispro  0-12 Units SubCUTAneous TID WC    insulin lispro  0-6 Units SubCUTAneous Nightly    mirtazapine  15 mg Oral Nightly    metoprolol succinate  12.5 mg Oral Daily    [Held by provider] chlorthalidone  25 mg Oral Daily    vancomycin (VANCOCIN) intermittent dosing (placeholder)   Other RX Placeholder    aspirin  81 mg Oral Daily    cephALEXin  500 mg Oral 4 times per day    atorvastatin  40 mg Oral Nightly    polyethylene glycol  17 g Oral Daily    sodium chloride flush  5-40 mL IntraVENous 2 times per day    heparin (porcine)  5,000 Units SubCUTAneous 3 times per day    allopurinol  100 mg Oral Daily    pregabalin  50 mg Oral BID      dextrose      sodium chloride 25 mL (03/24/22 1276)     oxyCODONE, glucose, glucagon (rDNA), dextrose, dextrose bolus (hypoglycemia) **OR** dextrose bolus (hypoglycemia), HYDROmorphone, sodium chloride flush, sodium chloride, ondansetron **OR** ondansetron, polyethylene glycol, acetaminophen **OR** acetaminophen    Lab Data:  CBC:   Recent Labs     22  1105 22  1027   WBC 8.4 8.9   HGB 8.1* 8.9*   HCT 26.2* 29.4*   MCV 96.0 97.4    400     BMP:   Recent Labs     22  1105 22  1027    140   K 4.7 5.2*    99   CO2 23 18*   BUN 38* 39*   CREATININE 2.2* 2.5*       Janell Marr, APRN - CNP,  3/27/2022 7:23 AM   I have seen ,spoken to  and examined this patient personally, independently of the NENA. I have reviewed the hospital care given to date and reviewed all pertinent labs and imaging. I have spoken with patient, nursing staff and provided written and verbal instructions . The above note has been reviewed     CARDIOLOGY ATTENDING ADDENDUM    HPI:  I have reviewed the above HPI  And agree with above     Pulse Range: Pulse  Av  Min: 72  Max: 84    Blood Presuure Range:  Systolic (78IOT), VQJ:790 , Min:122 , UYT:599   ; Diastolic (97NMJ), BNR:60, Min:56, Max:84      Pulse ox Range: SpO2  Av %  Min: 100 %  Max: 100 %    24hr I & O:  No intake or output data in the 24 hours ending 22 1506      BP (!) 122/56   Pulse 72   Temp 98.8 °F (37.1 °C) (Oral)   Resp 17   Ht 6' 0.01\" (1.829 m)   Wt 204 lb 8 oz (92.8 kg)   SpO2 100%   BMI 27.73 kg/m²       Physical Exam:  General:  Awake, alert, NAD  Head:normal  Eye:normal  Neck:  No JVD   Chest:  Clear to auscultation, respiration easy  Cardiovascular:  RRR S1S2  Abdomen:   nontender  Extremities:  tr edema  Pulses; palpable  Neuro: grossly normal      MEDICAL DECISION MAKING;    Pt assessed , chart reviewed, patient examined examined , all available data was reviewed, following is the plan which was discussed with NENA as well:    -Tachycardia that is SVT patient is on beta-blocker that is Coreg which will be continued    -Hypertension patient is on Toprol which is monitoring his blood pressure which will be continued    -Hypothyroidism patient is on replacement therapy with Synthroid 50 mcg p.o. daily    -Hyperlipidemia patient is on statin Lipitor check lipid profile    -Diabetes on insulin and alogliptin which is being monitored    -Patient has Klebsiella bacteremia and is on antibiotic per the primary team    -We will check an echo tomorrow for assessment of her EF and to see if there is any evidence of infective endocarditis          Patricia Fabian MD [facility]

## 2022-03-27 NOTE — PROGRESS NOTES
[id number] [facility]  consulted by Dr. Shima Carrington for monitoring and adjustment. Indication for treatment: Possible bacteremia  Goal trough: [] 10-15 mcg/mL or [x] 15-20 mcg/ml  AUC/USMAN: [] <500 or [x] 400-600    Pertinent Laboratory Values:   Temp Readings from Last 3 Encounters:   03/27/22 98.8 °F (37.1 °C) (Oral)   01/06/22 99.3 °F (37.4 °C) (Oral)   11/28/21 98.1 °F (36.7 °C) (Oral)     Recent Labs     03/25/22  1027 03/27/22  0822   WBC 8.9 9.6     Recent Labs     03/25/22  1027 03/27/22  0822   BUN 39* 45*   CREATININE 2.5* 2.0*     Estimated Creatinine Clearance: 31 mL/min (A) (based on SCr of 2 mg/dL (H)). No intake or output data in the 24 hours ending 03/27/22 1139    Pertinent Cultures:  Date    Source    Results  3/17   Blood    Klebsiella, Strep Viridans  3/23   Blood    Staph Capitis (2/2), CoNs (1 set)    Vancomycin level:   TROUGH:  No results for input(s): VANCOTROUGH in the last 72 hours.   RANDOM:    Recent Labs     03/25/22 1027 03/27/22  0822   VANCORANDOM 21.6 17.0       Assessment:  · SCr, BUN, and urine output:   · PAMELLA on CKD IV  · Scr trends improving  · Day(s) of therapy: 4  · Vancomycin concentration:  · 2/25: 21.6, re-dosed with 1000 mg  · 3/27: 17, appropriate to re-dose    Plan:  · Intermittent vancomycin dosing based on levels while in PAMELLA  · Based on level, re-dose with 1250 mg x1  · Repeat next level in 2 days  · Pharmacy will continue to monitor patient and adjust therapy as indicated    VANCOMYCIN CONCENTRATION SCHEDULED FOR 3/29 @ 0600    Thank you for the consult,  Gemma Marcos, [facility], PharmD  3/27/2022 11:39 AM

## 2022-03-27 NOTE — PROGRESS NOTES
Nephrology Progress Note  3/27/2022 10:02 AM        Subjective:   Admit Date: 3/16/2022  PCP: Donna Shaw MD    Interval History: Slowly improving    Diet: Appetite reasonable    ROS: No shortness of breath still has fatigue and tiredness  Urine output was not recorded-  No fever    Data:     Current meds:    propranolol  10 mg Oral TID    doxycycline hyclate  100 mg Oral 2 times per day    glipiZIDE  10 mg Oral BID AC    insulin glargine  15 Units SubCUTAneous Nightly    levothyroxine  50 mcg Oral Daily    insulin lispro  0-12 Units SubCUTAneous TID WC    insulin lispro  0-6 Units SubCUTAneous Nightly    mirtazapine  15 mg Oral Nightly    [Held by provider] chlorthalidone  25 mg Oral Daily    vancomycin (VANCOCIN) intermittent dosing (placeholder)   Other RX Placeholder    aspirin  81 mg Oral Daily    cephALEXin  500 mg Oral 4 times per day    atorvastatin  40 mg Oral Nightly    polyethylene glycol  17 g Oral Daily    sodium chloride flush  5-40 mL IntraVENous 2 times per day    heparin (porcine)  5,000 Units SubCUTAneous 3 times per day    allopurinol  100 mg Oral Daily    pregabalin  50 mg Oral BID      dextrose      sodium chloride 25 mL (03/24/22 4993)         I/O last 3 completed shifts: In: 250 [P.O.:240;  I.V.:10]  Out: 1100 [Urine:1100]    CBC:   Recent Labs     03/24/22  1105 03/25/22  1027   WBC 8.4 8.9   HGB 8.1* 8.9*    400          Recent Labs     03/24/22  1105 03/25/22  1027    140   K 4.7 5.2*    99   CO2 23 18*   BUN 38* 39*   CREATININE 2.2* 2.5*   GLUCOSE 231* 158*       Lab Results   Component Value Date    CALCIUM 9.0 03/25/2022    PHOS 3.3 03/19/2022       Objective:     Vitals: BP (!) 142/76   Pulse 82   Temp 97.6 °F (36.4 °C) (Oral)   Resp 16   Ht 6' 0.01\" (1.829 m)   Wt 204 lb 8 oz (92.8 kg)   SpO2 100%   BMI 27.73 kg/m²     General appearance: Alert, awake and oriented  HEENT: Positive conjunctival pallor  Neck: Supple  Lungs: Coarse breath sound, no crackles  Heart: Irregular this morning  Abdomen: Soft, abdomen is protuberant  Extremities: Positive edema  He has Hopper catheter      Problem List :         Impression :     1. 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 Sepsis with gram-negative organism-thought to be from genitourinary and source  3. He does have diabetes, dysrhythmia and hypertension    Recommendation/Plan  :     1. Review the lab-if creatinine and potassium up going-then add UA and urinary indicis  2. Also perhaps renal ultrasound to make sure, stent is not blocked-which is less likely-but not entirely impossible  3. Manual blood pressure  4.  Follow clinically and biochemically      Edvin Zaman MD MD

## 2022-03-28 LAB
ALBUMIN SERPL-MCNC: 3.1 GM/DL (ref 3.4–5)
ANION GAP SERPL CALCULATED.3IONS-SCNC: 13 MMOL/L (ref 4–16)
ANION GAP SERPL CALCULATED.3IONS-SCNC: 13 MMOL/L (ref 4–16)
BASOPHILS ABSOLUTE: 0.1 K/CU MM
BASOPHILS RELATIVE PERCENT: 0.5 % (ref 0–1)
BUN BLDV-MCNC: 49 MG/DL (ref 6–23)
BUN BLDV-MCNC: 51 MG/DL (ref 6–23)
CALCIUM SERPL-MCNC: 8.6 MG/DL (ref 8.3–10.6)
CALCIUM SERPL-MCNC: 8.7 MG/DL (ref 8.3–10.6)
CHLORIDE BLD-SCNC: 100 MMOL/L (ref 99–110)
CHLORIDE BLD-SCNC: 100 MMOL/L (ref 99–110)
CO2: 22 MMOL/L (ref 21–32)
CO2: 22 MMOL/L (ref 21–32)
CREAT SERPL-MCNC: 2.1 MG/DL (ref 0.9–1.3)
CREAT SERPL-MCNC: 2.1 MG/DL (ref 0.9–1.3)
DIFFERENTIAL TYPE: ABNORMAL
EOSINOPHILS ABSOLUTE: 0.6 K/CU MM
EOSINOPHILS RELATIVE PERCENT: 6.6 % (ref 0–3)
GFR AFRICAN AMERICAN: 37 ML/MIN/1.73M2
GFR AFRICAN AMERICAN: 37 ML/MIN/1.73M2
GFR NON-AFRICAN AMERICAN: 30 ML/MIN/1.73M2
GFR NON-AFRICAN AMERICAN: 30 ML/MIN/1.73M2
GLUCOSE BLD-MCNC: 158 MG/DL (ref 70–99)
GLUCOSE BLD-MCNC: 241 MG/DL (ref 70–99)
GLUCOSE BLD-MCNC: 255 MG/DL (ref 70–99)
GLUCOSE BLD-MCNC: 259 MG/DL (ref 70–99)
GLUCOSE BLD-MCNC: 261 MG/DL (ref 70–99)
GLUCOSE BLD-MCNC: 301 MG/DL (ref 70–99)
GLUCOSE BLD-MCNC: 328 MG/DL (ref 70–99)
HCT VFR BLD CALC: 26.6 % (ref 42–52)
HEMOGLOBIN: 8.1 GM/DL (ref 13.5–18)
IMMATURE NEUTROPHIL %: 0.6 % (ref 0–0.43)
LYMPHOCYTES ABSOLUTE: 1.7 K/CU MM
LYMPHOCYTES RELATIVE PERCENT: 18.1 % (ref 24–44)
MAGNESIUM: 2 MG/DL (ref 1.8–2.4)
MCH RBC QN AUTO: 28.8 PG (ref 27–31)
MCHC RBC AUTO-ENTMCNC: 30.5 % (ref 32–36)
MCV RBC AUTO: 94.7 FL (ref 78–100)
MONOCYTES ABSOLUTE: 0.6 K/CU MM
MONOCYTES RELATIVE PERCENT: 6.5 % (ref 0–4)
NUCLEATED RBC %: 0 %
PDW BLD-RTO: 15.3 % (ref 11.7–14.9)
PHOSPHORUS: 5.1 MG/DL (ref 2.5–4.9)
PLATELET # BLD: 337 K/CU MM (ref 140–440)
PMV BLD AUTO: 11.5 FL (ref 7.5–11.1)
POTASSIUM SERPL-SCNC: 4.9 MMOL/L (ref 3.5–5.1)
POTASSIUM SERPL-SCNC: 4.9 MMOL/L (ref 3.5–5.1)
RBC # BLD: 2.81 M/CU MM (ref 4.6–6.2)
SEGMENTED NEUTROPHILS ABSOLUTE COUNT: 6.4 K/CU MM
SEGMENTED NEUTROPHILS RELATIVE PERCENT: 67.7 % (ref 36–66)
SODIUM BLD-SCNC: 135 MMOL/L (ref 135–145)
SODIUM BLD-SCNC: 135 MMOL/L (ref 135–145)
TOTAL IMMATURE NEUTOROPHIL: 0.06 K/CU MM
TOTAL NUCLEATED RBC: 0 K/CU MM
WBC # BLD: 9.4 K/CU MM (ref 4–10.5)

## 2022-03-28 PROCEDURE — 6370000000 HC RX 637 (ALT 250 FOR IP): Performed by: INTERNAL MEDICINE

## 2022-03-28 PROCEDURE — 2580000003 HC RX 258: Performed by: NURSE PRACTITIONER

## 2022-03-28 PROCEDURE — 80069 RENAL FUNCTION PANEL: CPT

## 2022-03-28 PROCEDURE — 85025 COMPLETE CBC W/AUTO DIFF WBC: CPT

## 2022-03-28 PROCEDURE — 80048 BASIC METABOLIC PNL TOTAL CA: CPT

## 2022-03-28 PROCEDURE — 6370000000 HC RX 637 (ALT 250 FOR IP): Performed by: FAMILY MEDICINE

## 2022-03-28 PROCEDURE — 6360000002 HC RX W HCPCS: Performed by: UROLOGY

## 2022-03-28 PROCEDURE — 6370000000 HC RX 637 (ALT 250 FOR IP): Performed by: NURSE PRACTITIONER

## 2022-03-28 PROCEDURE — APPSS45 APP SPLIT SHARED TIME 31-45 MINUTES: Performed by: NURSE PRACTITIONER

## 2022-03-28 PROCEDURE — 83735 ASSAY OF MAGNESIUM: CPT

## 2022-03-28 PROCEDURE — 2580000003 HC RX 258: Performed by: UROLOGY

## 2022-03-28 PROCEDURE — 94761 N-INVAS EAR/PLS OXIMETRY MLT: CPT

## 2022-03-28 PROCEDURE — 6370000000 HC RX 637 (ALT 250 FOR IP): Performed by: HOSPITALIST

## 2022-03-28 PROCEDURE — 99232 SBSQ HOSP IP/OBS MODERATE 35: CPT | Performed by: INTERNAL MEDICINE

## 2022-03-28 PROCEDURE — 6370000000 HC RX 637 (ALT 250 FOR IP): Performed by: UROLOGY

## 2022-03-28 PROCEDURE — 1200000000 HC SEMI PRIVATE

## 2022-03-28 PROCEDURE — 36415 COLL VENOUS BLD VENIPUNCTURE: CPT

## 2022-03-28 PROCEDURE — 82962 GLUCOSE BLOOD TEST: CPT

## 2022-03-28 RX ORDER — SODIUM CHLORIDE 9 MG/ML
INJECTION, SOLUTION INTRAVENOUS CONTINUOUS
Status: DISCONTINUED | OUTPATIENT
Start: 2022-03-28 | End: 2022-03-28

## 2022-03-28 RX ORDER — ALOGLIPTIN 12.5 MG/1
6.25 TABLET, FILM COATED ORAL DAILY
Status: DISCONTINUED | OUTPATIENT
Start: 2022-03-28 | End: 2022-03-28

## 2022-03-28 RX ORDER — GLIPIZIDE 5 MG/1
15 TABLET ORAL
Status: DISCONTINUED | OUTPATIENT
Start: 2022-03-28 | End: 2022-03-30 | Stop reason: HOSPADM

## 2022-03-28 RX ADMIN — PREGABALIN 50 MG: 50 CAPSULE ORAL at 08:25

## 2022-03-28 RX ADMIN — DOXYCYCLINE HYCLATE 100 MG: 100 TABLET, COATED ORAL at 21:51

## 2022-03-28 RX ADMIN — SODIUM CHLORIDE, PRESERVATIVE FREE 10 ML: 5 INJECTION INTRAVENOUS at 08:50

## 2022-03-28 RX ADMIN — GLIPIZIDE 15 MG: 5 TABLET ORAL at 08:28

## 2022-03-28 RX ADMIN — SODIUM CHLORIDE, PRESERVATIVE FREE 10 ML: 5 INJECTION INTRAVENOUS at 21:53

## 2022-03-28 RX ADMIN — SODIUM CHLORIDE: 9 INJECTION, SOLUTION INTRAVENOUS at 06:25

## 2022-03-28 RX ADMIN — POLYETHYLENE GLYCOL (3350) 17 G: 17 POWDER, FOR SOLUTION ORAL at 08:26

## 2022-03-28 RX ADMIN — HEPARIN SODIUM 5000 UNITS: 5000 INJECTION INTRAVENOUS; SUBCUTANEOUS at 21:51

## 2022-03-28 RX ADMIN — HEPARIN SODIUM 5000 UNITS: 5000 INJECTION INTRAVENOUS; SUBCUTANEOUS at 06:20

## 2022-03-28 RX ADMIN — DOXYCYCLINE HYCLATE 100 MG: 100 TABLET, COATED ORAL at 08:26

## 2022-03-28 RX ADMIN — CEPHALEXIN 500 MG: 500 CAPSULE ORAL at 14:25

## 2022-03-28 RX ADMIN — CEPHALEXIN 500 MG: 500 CAPSULE ORAL at 18:46

## 2022-03-28 RX ADMIN — PREGABALIN 50 MG: 50 CAPSULE ORAL at 21:51

## 2022-03-28 RX ADMIN — LEVOTHYROXINE SODIUM 50 MCG: 0.05 TABLET ORAL at 06:19

## 2022-03-28 RX ADMIN — ATORVASTATIN CALCIUM 40 MG: 40 TABLET, FILM COATED ORAL at 21:51

## 2022-03-28 RX ADMIN — HYDRALAZINE HYDROCHLORIDE 25 MG: 25 TABLET, FILM COATED ORAL at 06:20

## 2022-03-28 RX ADMIN — ASPIRIN 81 MG CHEWABLE TABLET 81 MG: 81 TABLET CHEWABLE at 08:24

## 2022-03-28 RX ADMIN — ALOGLIPTIN 6.25 MG: 12.5 TABLET, FILM COATED ORAL at 10:10

## 2022-03-28 RX ADMIN — MIRTAZAPINE 15 MG: 15 TABLET, ORALLY DISINTEGRATING ORAL at 21:52

## 2022-03-28 RX ADMIN — HEPARIN SODIUM 5000 UNITS: 5000 INJECTION INTRAVENOUS; SUBCUTANEOUS at 14:26

## 2022-03-28 RX ADMIN — ALLOPURINOL 100 MG: 100 TABLET ORAL at 08:25

## 2022-03-28 RX ADMIN — CEPHALEXIN 500 MG: 500 CAPSULE ORAL at 06:39

## 2022-03-28 RX ADMIN — HYDRALAZINE HYDROCHLORIDE 25 MG: 25 TABLET, FILM COATED ORAL at 21:51

## 2022-03-28 RX ADMIN — INSULIN GLARGINE 20 UNITS: 100 INJECTION, SOLUTION SUBCUTANEOUS at 21:52

## 2022-03-28 RX ADMIN — HYDRALAZINE HYDROCHLORIDE 25 MG: 25 TABLET, FILM COATED ORAL at 14:26

## 2022-03-28 RX ADMIN — CARVEDILOL 3.12 MG: 3.12 TABLET, FILM COATED ORAL at 08:31

## 2022-03-28 RX ADMIN — GLIPIZIDE 15 MG: 5 TABLET ORAL at 18:45

## 2022-03-28 RX ADMIN — CARVEDILOL 3.12 MG: 3.12 TABLET, FILM COATED ORAL at 18:46

## 2022-03-28 ASSESSMENT — PAIN SCALES - GENERAL
PAINLEVEL_OUTOF10: 0

## 2022-03-28 NOTE — CARE COORDINATION
LSW call to ana/ARU. If Pt is medically stable pt can discharge to ARU today. Message to [facility] and requested a rapid Covid.

## 2022-03-28 NOTE — PROGRESS NOTES
Night Shift RN Notes:  Safety:  Patient had an uneventful night. Hourly rounding done to assure patient safety. Side rails up x2, fall kit applied, yellow socks on , yellow blanket, fall jewels, low bed, bed alarm at zone 2 and was on the whole night, call light and phone within reach. Side table with reach. Safety floor mattress applied. Education on safety was provided last night. Laboratory Results:  Noted that BUN is trending up more. , Vish Shirleyfast was notified  Rod Rios  3/16/2022 23:56 39 (H)   3/17/2022 11:05 48 (H)   3/19/2022 04:24 60 (H)   3/21/2022 07:11 46 (H)   3/22/2022 08:23 37 (H)   3/23/2022 07:21 37 (H)   3/24/2022 11:05 38 (H)   3/25/2022 10:27  39 (H)   3/27/2022 08:22 45 (H)   3/28/2022 00:20 49 (H)   3/28/2022 00:20 51 (H)     Intervention:   IVF was started @ 100 cc/hr    I/O last 3 completed shifts: In: 245 [P.O.:240; I.V.:5]  Out: -   I/O this shift:   In: 417.6 [P.O.:200; IV Piggyback:217.6]  Out: 1075 [NEZKN:4169]

## 2022-03-28 NOTE — PROGRESS NOTES
Comprehensive Nutrition Assessment    Type and Reason for Visit:  Reassess    Nutrition Recommendations/Plan:   Continue current diet with oral nutrition supplements   Assist/supervise meals to encourage intake   Document actual po intake/meal and supplement intake  Will continue to follow up during stay    Nutrition Assessment:  Remains on carb controlled diet with oral nutrition supplements, frozen and pudding. Only one meal intake record for past 4 days, % for one meal. Patient asleep in bed on visit with meal tray in room, not much eaten yet. May need assist/supervision at meals to encourage intake. Will continue to follow at high nutrition risk at this time. Malnutrition Assessment:  Malnutrition Status: At risk for malnutrition (Comment)    Context:  Acute Illness       Estimated Daily Nutrient Needs:  Energy (kcal):  2023 (25 kcal/kg IBW); Weight Used for Energy Requirements:  Ideal     Protein (g):  81-97 (1-1.2 g/kg IBW); Weight Used for Protein Requirements:  Ideal        Fluid (ml/day):  2000; Method Used for Fluid Requirements:  1 ml/kcal      Nutrition Related Findings:  BUN/Cr increasing during stay, confused and disoriented during stay,  remeron started,  MD documentation reasonable/good intake?, contact isolation      Wounds:   (heel, groin, abdominal fold wounds noted)       Current Nutrition Therapies:    ADULT ORAL NUTRITION SUPPLEMENT; Lunch, Dinner; Frozen Oral Supplement  ADULT DIET; Regular; 5 carb choices (75 gm/meal)  ADULT ORAL NUTRITION SUPPLEMENT; Breakfast; Fortified Pudding Oral Supplement    Anthropometric Measures:  · Height: 6' 0.01\" (182.9 cm)  · Current Body Weight: 201 lb 1 oz (91.2 kg)   · Admission Body Weight: 201 lb 14.4 oz (91.6 kg)    · Usual Body Weight: 204 lb 13 oz (92.9 kg) (11/18/21)     · Ideal Body Weight: 178 lbs; % Ideal Body Weight 114.9 %   · BMI: 27.3  · Adjusted Body Weight:  ; No Adjustment   · BMI Categories: Overweight (BMI 25.0-29. 9) Nutrition Diagnosis:   · Inadequate oral intake related to pain as evidenced by intake 0-25%,intake 26-50%      Nutrition Interventions:   Food and/or Nutrient Delivery:  Continue Current Diet,Continue Oral Nutrition Supplement  Nutrition Education/Counseling:  No recommendation at this time   Coordination of Nutrition Care:  Continue to monitor while inpatient,Feeding Assistance/Environment Change    Goals:  Pt will meet greater than 50% of estimated energy needs via po intake in the next 24 hr or EN will be initiated       Nutrition Monitoring and Evaluation:   Behavioral-Environmental Outcomes:  None Identified   Food/Nutrient Intake Outcomes:  Diet Advancement/Tolerance,Food and Nutrient Intake,Supplement Intake  Physical Signs/Symptoms Outcomes:  Biochemical Data,Meal Time Behavior,Skin,Weight     Discharge Planning:    Continue current diet,Continue Oral Nutrition Supplement     Electronically signed by Michel Donahue RD, LD on 3/28/22 at 12:47 PM EDT    Contact: [phone]

## 2022-03-28 NOTE — PROGRESS NOTES
ATTENDING PHYSICIAN'S PROGRESS NOTES    Patient:  Juvenal Tiffin      Unit/Bed:1124/1124-A    YOB: 1940    MRN: [id number]     Acct: [de-identified]     Admit date: 3/16/2022    Patient Seen, Chart, Consults notes, Labs, Radiology studies reviewed. SUBJECTIVE:   Day 11 of stay with UTI with sepsis, and subsequently developed an SVT, also found to have PAMELLA on baseline CKD stage IV and most recent (in last 24 hours) has had some improvement in his presentation, sitting up in bed, alert and oriented to self and place but not to time. Culture results noted. Patient complains of back pain but denies any fever or chills. Overnight patient was found to have markedly elevated BUN compared to creatinine and was started on IV fluids. However IV fluids have been discontinued by nephrologist and with the probable diagnosis of GI bleeding. Patient was asymptomatic when seen and examined and vital signs are stable and H&H remained stable. Of note, patient has been approved for discharge to the ARU. All other ROS negative except noted in HPI    Past, Family, Social History unchanged from admission. Diet:  ADULT ORAL NUTRITION SUPPLEMENT; Lunch, Dinner; Frozen Oral Supplement  ADULT DIET;  Regular; 5 carb choices (75 gm/meal)  ADULT ORAL NUTRITION SUPPLEMENT; Breakfast; Fortified Pudding Oral Supplement    Medications:  Scheduled Meds:   glipiZIDE  15 mg Oral BID AC    doxycycline hyclate  100 mg Oral 2 times per day    alogliptin  6.25 mg Oral Daily    insulin glargine  20 Units SubCUTAneous Nightly    insulin lispro  0-12 Units SubCUTAneous Nightly    carvedilol  3.125 mg Oral BID WC    hydrALAZINE  25 mg Oral 3 times per day    levothyroxine  50 mcg Oral Daily    insulin lispro  0-12 Units SubCUTAneous TID WC    mirtazapine  15 mg Oral Nightly    vancomycin (VANCOCIN) intermittent dosing (placeholder)   Other RX Placeholder    aspirin  81 mg Oral Daily    cephALEXin  500 mg Oral 4 times per day    atorvastatin  40 mg Oral Nightly    polyethylene glycol  17 g Oral Daily    sodium chloride flush  5-40 mL IntraVENous 2 times per day    heparin (porcine)  5,000 Units SubCUTAneous 3 times per day    allopurinol  100 mg Oral Daily    pregabalin  50 mg Oral BID     Continuous Infusions:   dextrose      sodium chloride 25 mL (03/24/22 3947)     PRN Meds:oxyCODONE, glucose, glucagon (rDNA), dextrose, dextrose bolus (hypoglycemia) **OR** dextrose bolus (hypoglycemia), HYDROmorphone, sodium chloride flush, sodium chloride, ondansetron **OR** ondansetron, polyethylene glycol, acetaminophen **OR** acetaminophen    OBJECTIVE:    CBC:   Recent Labs     03/27/22  0822 03/28/22  0020   WBC 9.6 9.4   HGB 8.3* 8.1*   * 337     BMP:    Recent Labs     03/27/22  0822 03/28/22  0020    135  135   K 4.6 4.9  4.9   CL 99 100  100   CO2 21 22 22   BUN 45* 51*  49*   CREATININE 2.0* 2.1*  2.1*   GLUCOSE 220* 261*  259*     Calcium:  Recent Labs     03/28/22  0020   CALCIUM 8.7  8.6     Ionized Calcium:No results for input(s): IONCA in the last 72 hours. Magnesium:  Recent Labs     03/28/22  0020   MG 2.0     Phosphorus:  Recent Labs     03/28/22  0020   PHOS 5.1*     BNP:No results for input(s): BNP in the last 72 hours. Glucose:  Recent Labs     03/28/22  0823 03/28/22  1044 03/28/22  1238   POCGLU 158* 255* 241*     HgbA1C: No results for input(s): LABA1C in the last 72 hours. INR: No results for input(s): INR in the last 72 hours. Hepatic:   Recent Labs     03/28/22  0020   LABALBU 3.1*     Amylase and Lipase:No results for input(s): LACTA, AMYLASE in the last 72 hours. Lactic Acid: No results for input(s): LACTA in the last 72 hours. Troponin:   Recent Labs     03/25/22  1624   TROPONINT 0.028*     BNP: No results for input(s): BNP in the last 72 hours. Lipids: No results for input(s): CHOL, TRIG, HDL, LDL, LDLCALC in the last 72 hours.   ABGs: No results found for: PH, PCO2, PO2, HCO3, O2SAT    Radiology reports as per the Radiologist  Radiology: CT ABDOMEN PELVIS WO CONTRAST Additional Contrast? None    Result Date: 3/17/2022  EXAMINATION: CT OF THE ABDOMEN AND PELVIS WITHOUT CONTRAST 3/17/2022 3:04 am TECHNIQUE: CT of the abdomen and pelvis was performed without the administration of intravenous contrast. Multiplanar reformatted images are provided for review. Dose modulation, iterative reconstruction, and/or weight based adjustment of the mA/kV was utilized to reduce the radiation dose to as low as reasonably achievable. COMPARISON: 09/12/2020 HISTORY: ORDERING SYSTEM PROVIDED HISTORY: UTI with altered mental status and history of renal stones TECHNOLOGIST PROVIDED HISTORY: Reason for exam:->UTI with altered mental status and history of renal stones Additional Contrast?->None Decision Support Exception - unselect if not a suspected or confirmed emergency medical condition->Emergency Medical Condition (MA) Reason for Exam: UTI with altered mental status and history of renal stones FINDINGS: Lower Chest: Calcified lymph nodes in the right hilar region. Atelectatic or fibrotic changes along the posterior lungs. Organs: Lack of IV contrast does reduce the evaluation of the organs and vasculature. There is streak artifact from the arms at the sides. No obvious masses seen within the liver. The gallbladder is distended and may have a tiny gallstone in the lumen. There is no fat stranding of the gallbladder fossa. The spleen is not enlarged but does have multiple calcified granulomas. No pancreatic calcification or ductal dilatation. There is stranding and edema adjacent to the pancreatic tail but appears more associated with the left kidney. The adrenal glands are not enlarged. The right kidney has mild perinephric stranding no hydronephrosis or hydroureter. The left kidney does have asymmetric edema and increased perinephric stranding.   Small amount of fluid are seen in the pararenal space. A left ureteral stent is present coursing down into the urinary bladder. No focal calcifications are seen along the course of the stent. However there is still the left-sided hydronephrosis and hydroureter. GI/Bowel: The stomach, small bowel, and colon are not dilated. There is a small hiatal hernia with mild thickening of the distal esophagus. Constipation and gaseous distension of the ascending and transverse colon. Appendix is identified and no focal appendicitis. There is no pneumoperitoneum. Pelvis: The urinary bladder is decompressed with a suprapubic catheter. Cannot accurately evaluate the urinary bladder wall. The distal coil of the left ureteral stent is in the inferior aspect versus is an expanded upper portion of the urethral junction. 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 Peritoneum/Retroperitoneum: No abdominal aortic aneurysm but does have moderate calcification. There is no retroperitoneal hematoma. Small left periaortic lymph nodes were present on the previous exam as well. Mild increase in size may be reactive to the left kidney. Bones/Soft Tissues: Degenerative changes in the disc spaces, facet joints, and sacroiliac joints. Large Schmorl node in the inferior endplate of X48 is increased in size. Smaller Schmorl nodes along the lumbar endplates appear stable. 1.  There is left-sided hydronephrosis and hydroureter despite the presence of the left ureteral stent. Increased left perinephric stranding and fluid compared to the right side. Will need to correlate for functionality of the stent versus ascending urinary tract infection. 2.  The urinary bladder is collapsed around the suprapubic catheter. The distal coil of the ureteral stent courses into the urinary bladder along the inferior aspect or an expanded upper portion of the urethral junction. Previous prostate surgery is again noted.  3.  Constipation in the ascending and transverse colon. No small bowel obstruction or pneumoperitoneum. 4.  Mild thickening of the distal esophagus may relate to reflux esophagitis. CT HEAD WO CONTRAST    Result Date: 3/17/2022  EXAMINATION: CT OF THE HEAD WITHOUT CONTRAST  3/17/2022 12:19 am TECHNIQUE: CT of the head was performed without the administration of intravenous contrast. Dose modulation, iterative reconstruction, and/or weight based adjustment of the mA/kV was utilized to reduce the radiation dose to as low as reasonably achievable. COMPARISON: 11/16/2021 HISTORY: ORDERING SYSTEM PROVIDED HISTORY: AMS TECHNOLOGIST PROVIDED HISTORY: Reason for exam:->AMS Has a \"code stroke\" or \"stroke alert\" been called? ->No Decision Support Exception - unselect if not a suspected or confirmed emergency medical condition->Emergency Medical Condition (MA) Reason for Exam: AMS FINDINGS: BRAIN/VENTRICLES: There is no acute intracranial hemorrhage, mass effect or midline shift. No abnormal extra-axial fluid collection. The gray-white differentiation is maintained without evidence of an acute infarct. Hypoattenuation of the periventricular and subcortical white matter is suggestive of chronic small vessel ischemic disease. Mild diffuse parenchymal volume loss is noted. There is no evidence of hydrocephalus. ORBITS: The bilateral globes are intact. SINUSES: Mucoperiosteal thickening of the bilateral ethmoid air cells is noted. Other visualized paranasal sinuses and mastoid air cells are clear. SOFT TISSUES/SKULL:  No acute abnormality of the visualized skull or soft tissues. No acute intracranial abnormality. MRI may be obtained if clinically indicated. FL LESS THAN 1 HOUR    Result Date: 3/17/2022  Radiology exam is complete. No Radiologist dictation. Please follow up with ordering provider.      XR CHEST PORTABLE    Result Date: 3/17/2022  EXAMINATION: ONE XRAY VIEW OF THE CHEST 3/16/2022 11:48 pm COMPARISON: 01/06/2022 HISTORY: ORDERING SYSTEM PROVIDED HISTORY: fever TECHNOLOGIST PROVIDED HISTORY: Reason for exam:->fever Reason for Exam: FEVER FINDINGS: The cardiac silhouette is at the upper limits of normal in size. Vascular calcifications are noted along the aortic arch. Pulmonary edema is increasing. No focal lung infiltrate. No pleural effusion or pneumothorax. The bones are osteopenic. 1. Worsening pulmonary edema. Physical Exam:  Vitals: /61   Pulse 78   Temp 97.6 °F (36.4 °C) (Oral)   Resp 18   Ht 6' 0.01\" (1.829 m)   Wt 201 lb (91.2 kg)   SpO2 96%   BMI 27.25 kg/m²   24 hour intake/output:    Intake/Output Summary (Last 24 hours) at 3/28/2022 1443  Last data filed at 3/28/2022 0850  Gross per 24 hour   Intake 422.55 ml   Output 1075 ml   Net -652.45 ml     Last 3 weights:   Wt Readings from Last 3 Encounters:   03/28/22 201 lb (91.2 kg)   01/06/22 210 lb (95.3 kg)   11/28/21 210 lb (95.3 kg)       General appearance - acyanotic, in no respiratory distress, well hydrated and chronically ill appearing  HEENT: Normocephalic, Atraumatic, Conjuctiva pink, PERRL, Oral mucosa normal, Lips, teeth and gums normal, Trachea midline, Thyroid normal and No noted lymphadenopathy  Chest - clear to auscultation, no wheezes, rales or rhonchi, symmetric air entry  Cardiovascular - normal rate, regular rhythm, normal S1, S2, no murmurs, rubs, clicks or gallops  Abdomen - soft, nontender, nondistended, no masses or organomegaly   Neurological -awake, alert and oriented in no apparent distress, No focal findings or movement disorder noted and Motor and sensory grossly normal bilaterally  Integumentary - Skin color, texture, turgor normal. No Rashes or lesions  Musculoskeletal -Full ROM times 4 extremities, No clubbing or cyanosis and No peripheral edema      DVT prophylaxis: [] Lovenox                                 [] SCDs                                 [x] SQ Heparin                                 [] Encourage ambulation [] Already on Anticoagulation                 ASSESSMENT / PLAN :    Principal Problem:    Sepsis secondary to UTI ([facility] [address]  Blood cultures on 3/17 growing Klebsiella pneumonia and patient is on Keflex which is sensitive. We will continue to monitor closely and may repeat the UA with culture to establish clearance. SVT (supraventricular tachycardia) ([facility] [address]  Patient being maintained on low-dose metoprolol with heart rate well controlled. Patient being followed closely with further recommendations and management by cardiology consult. Possible GI Bleed  Patient's H&H remained stable; however with a marked elevation of BUN as against creatinine, there is the likely indication of occult GI bleed as per nephrology consult input. We will therefore obtain stool guaiac x3 and involve GI consult for possible EGD and/or Colonoscopy. Active Problems:    Diabetes mellitus ([facility] [address]  Being managed by endocrinologist      Acute kidney failure ([facility] [address] on baseline Chronic kidney disease (CKD), stage IV (severe) ([facility] [address]  Patient is being closely monitored and managed by nephrology consult with further recommendations. 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 ([facility] [address]  Continue with patient's Lipitor dose of 40 mg nightly. Coagulase negative Staphylococcus bacteremia  Final culture results showing Staphylococcus Capitis, sensitive to doxycycline and therefore has been added to his regimen. .      Acute metabolic encephalopathy  Markedly improved with patient alert and oriented to self and place but not to time, giving meaningful history and following commands. Will continue with current management and patient will benefit from placement in the [facility] for rehabilitation and recuperation. Resolved Problems:    * No resolved hospital problems. *    Patient has been accepted to the ARU and therefore should be discharged whenever the GI issues with reference to renal function has been addressed.   A.m. labs    Electronically signed by Leeroy Scruggs MD on 3/28/2022 at 2:43 PM    Rounding Hospitalist

## 2022-03-28 NOTE — PROGRESS NOTES
Lab work reviewed this morning. BUN trending up. Patient had 100 cc urine output overnight. Chest x-ray from 3/1 6 did show some pulmonary vascular congestion however patient is currently on room air. Will  stat 100 cc normal saline per hour over 10 hours pending nephrology and attending reevaluation.

## 2022-03-28 NOTE — PROGRESS NOTES
Nephrology Progress Note  3/28/2022 12:00 PM        Subjective:   Admit Date: 3/16/2022  PCP: Shubham Wright MD    Interval History: Patient seen in early morning, this is a late entry    Diet: Reasonable oral intake    ROS: No shortness of breath or confusion  Seems improving  Urine output recorded little more than 1 L/day    Data:     Current meds:    glipiZIDE  15 mg Oral BID AC    alogliptin  6.25 mg Oral Daily    doxycycline hyclate  100 mg Oral 2 times per day    alogliptin  6.25 mg Oral Daily    insulin glargine  20 Units SubCUTAneous Nightly    insulin lispro  0-12 Units SubCUTAneous Nightly    carvedilol  3.125 mg Oral BID WC    hydrALAZINE  25 mg Oral 3 times per day    levothyroxine  50 mcg Oral Daily    insulin lispro  0-12 Units SubCUTAneous TID WC    mirtazapine  15 mg Oral Nightly    [Held by provider] chlorthalidone  25 mg Oral Daily    vancomycin (VANCOCIN) intermittent dosing (placeholder)   Other RX Placeholder    aspirin  81 mg Oral Daily    cephALEXin  500 mg Oral 4 times per day    atorvastatin  40 mg Oral Nightly    polyethylene glycol  17 g Oral Daily    sodium chloride flush  5-40 mL IntraVENous 2 times per day    heparin (porcine)  5,000 Units SubCUTAneous 3 times per day    allopurinol  100 mg Oral Daily    pregabalin  50 mg Oral BID      sodium chloride 100 mL/hr at 03/28/22 0625    dextrose      sodium chloride 25 mL (03/24/22 1737)         I/O last 3 completed shifts:   In: 417.6 [P.O.:200; IV Piggyback:217.6]  Out: 1075 [YPVSQ:2547]    CBC:   Recent Labs     03/27/22  0822 03/28/22  0020   WBC 9.6 9.4   HGB 8.3* 8.1*   * 337          Recent Labs     03/27/22  0822 03/28/22  0020    135  135   K 4.6 4.9  4.9   CL 99 100  100   CO2 21 22 22   BUN 45* 51*  49*   CREATININE 2.0* 2.1*  2.1*   GLUCOSE 220* 261*  259*       Lab Results   Component Value Date    CALCIUM 8.6 03/28/2022    CALCIUM 8.7 03/28/2022    PHOS 5.1 (H) 03/28/2022

## 2022-03-28 NOTE — PROGRESS NOTES
Cardiology Note    Today's Plan: continue current medical management. Admit Date:  3/16/2022    Admission diagnosis / Complaint: ? Tachycardia      Subjective:  Mr. Jayant Rankin states that he is okay today. He has less interactive than yesterday. Denies any chest pain shortness of breath palpitations dizziness orthopnea. He is laying flat today during examination respirations are easy does not appear to be in any distress. Assessment and Plan:  1. SVT: Patient has been having elevated heart rate in the evening. But this morning heart rate is well controlled. Continue Coreg 3.125 mg twice daily. 2. Hypertension: Controlled on norvasc, hydralazine, Coreg. Echo 7/2021 @ [facility]:   1. Left ventricle: The cavity size was normal. There was mild      concentric hypertrophy. Systolic function was normal. The      estimated ejection fraction was in the range of 55% to 60%. Left      ventricular diastolic function parameters were normal for the      patient's age. 2. Right ventricle: The cavity size was normal. Wall thickness was      normal. Systolic function was mildly reduced. 3. Atrial septum: Agitated saline study showed no shunt, at      baseline or with provocation. 4. Pulmonary arteries: Systolic pressure was within the normal      range. Estimated PA peak pressure is 26mm Hg (S). 5. Pericardium, extracardiac: There was no pericardial effusion.        Objective:   /68   Pulse 77   Temp 98.5 °F (36.9 °C) (Oral)   Resp 19   Ht 6' 0.01\" (1.829 m)   Wt 201 lb (91.2 kg)   SpO2 100%   BMI 27.25 kg/m²       Intake/Output Summary (Last 24 hours) at 3/28/2022 0741  Last data filed at 3/28/2022 1314  Gross per 24 hour   Intake 417.55 ml   Output 1075 ml   Net -657.45 ml       TELEMETRY: Sinus      has a past medical history of Acute urinary tract infection, Ataxia, Diabetes mellitus ([address] Fusion of spine of cervical region, Gait disturbance, History of prostate cancer, History of tobacco use, Hyperlipidemia, and Osteoarthritis. has a past surgical history that includes Prostate surgery; other surgical history (3/28/13); Colon surgery; Bladder surgery; Prostatectomy (1996); and Bladder surgery (Left, 3/17/2022). Physical Exam  Vitals reviewed. Constitutional:       General: He is not in acute distress. Appearance: Normal appearance. He is not ill-appearing. HENT:      Head: Atraumatic. Neck:      Vascular: No carotid bruit. Cardiovascular:      Rate and Rhythm: Normal rate and regular rhythm. Pulses: Normal pulses. Heart sounds: Normal heart sounds. No murmur heard. Pulmonary:      Effort: Pulmonary effort is normal. No respiratory distress. Breath sounds: Normal breath sounds. Musculoskeletal:         General: No deformity. Cervical back: Neck supple. No muscular tenderness. Right lower leg: Edema present. Left lower leg: Edema present. Neurological:      Mental Status: He is alert.           Medications:    glipiZIDE  15 mg Oral BID AC    alogliptin  6.25 mg Oral Daily    doxycycline hyclate  100 mg Oral 2 times per day    alogliptin  6.25 mg Oral Daily    insulin glargine  20 Units SubCUTAneous Nightly    insulin lispro  0-12 Units SubCUTAneous Nightly    carvedilol  3.125 mg Oral BID WC    hydrALAZINE  25 mg Oral 3 times per day    levothyroxine  50 mcg Oral Daily    insulin lispro  0-12 Units SubCUTAneous TID WC    mirtazapine  15 mg Oral Nightly    [Held by provider] chlorthalidone  25 mg Oral Daily    vancomycin (VANCOCIN) intermittent dosing (placeholder)   Other RX Placeholder    aspirin  81 mg Oral Daily    cephALEXin  500 mg Oral 4 times per day    atorvastatin  40 mg Oral Nightly    polyethylene glycol  17 g Oral Daily    sodium chloride flush  5-40 mL IntraVENous 2 times per day    heparin (porcine)  5,000 Units SubCUTAneous 3 times per day    allopurinol  100 mg Oral Daily    pregabalin  50 mg Oral BID      sodium chloride 100 mL/hr at 22 8625    dextrose      sodium chloride 25 mL (22 1737)     oxyCODONE, glucose, glucagon (rDNA), dextrose, dextrose bolus (hypoglycemia) **OR** dextrose bolus (hypoglycemia), HYDROmorphone, sodium chloride flush, sodium chloride, ondansetron **OR** ondansetron, polyethylene glycol, acetaminophen **OR** acetaminophen    Lab Data:  CBC:   Recent Labs     22  1027 22  0822 22  0020   WBC 8.9 9.6 9.4   HGB 8.9* 8.3* 8.1*   HCT 29.4* 26.1* 26.6*   MCV 97.4 93.5 94.7    489* 337     BMP:   Recent Labs     22  1027 22  0822 22  0020    135 135  135   K 5.2* 4.6 4.9  4.9   CL 99 99 100  100   CO2 18* 21 22  22   PHOS  --   --  5.1*   BUN 39* 45* 51*  49*   CREATININE 2.5* 2.0* 2.1*  2.1*       Janell Marr, APRN - CNP,  3/28/2022 7:41 AM    I have seen ,spoken to  and examined this patient personally, independently of the NENA. I have reviewed the hospital care given to date and reviewed all pertinent labs and imaging. I have spoken with patient, nursing staff and provided written and verbal instructions . The above note has been reviewed     CARDIOLOGY ATTENDING ADDENDUM    HPI:  I have reviewed the above HPI  And agree with above     Pulse Range: Pulse  Av  Min: 77  Max: 142    Blood Presuure Range:  Systolic (43JUM), LU , Min:93 , DVT:161   ; Diastolic (71EQQ), LDB:73, Min:61, Max:77      Pulse ox Range: SpO2  Av.4 %  Min: 96 %  Max: 100 %    24hr I & O:    Intake/Output Summary (Last 24 hours) at 3/28/2022 1618  Last data filed at 3/28/2022 0850  Gross per 24 hour   Intake 422.55 ml   Output 1075 ml   Net -652.45 ml         /61   Pulse 78   Temp 97.6 °F (36.4 °C) (Oral)   Resp 18   Ht 6' 0.01\" (1.829 m)   Wt 201 lb (91.2 kg)   SpO2 96%   BMI 27.25 kg/m²       Physical Exam:  General:  Awake, alert, NAD  Head:normal  Eye:normal  Neck:  No JVD   Chest:  Clear to auscultation, respiration easy  Cardiovascular:  RRR S1S2  Abdomen:   nontender  Extremities:  tr edema  Pulses; palpable  Neuro: grossly normal      MEDICAL DECISION MAKING;    Pt assessed , chart reviewed, patient examined examined , all available data was reviewed, following is the plan which was discussed with NENA as well:    -Tachycardia that is SVT patient is on beta-blocker that is Coreg which will be continued     -Hypertension patient is on Toprol which is monitoring his blood pressure which will be continued     -Hypothyroidism patient is on replacement therapy with Synthroid 50 mcg p.o. daily     -Hyperlipidemia patient is on statin Lipitor check lipid profile     -Diabetes on insulin and alogliptin which is being monitored     -Patient has Klebsiella bacteremia and is on antibiotic per the primary team     -We will check an echo           Vanda Velasquez MD [facility] [place]

## 2022-03-28 NOTE — PROGRESS NOTES
[facility]  consulted by Dr. Lee Wagner for monitoring and adjustment. Indication for treatment: Possible bacteremia  Goal trough: [] 10-15 mcg/mL or [x] 15-20 mcg/ml  AUC/USMAN: [] <500 or [x] 400-600    Pertinent Laboratory Values:   Temp Readings from Last 3 Encounters:   03/28/22 97.6 °F (36.4 °C) (Oral)   01/06/22 99.3 °F (37.4 °C) (Oral)   11/28/21 98.1 °F (36.7 °C) (Oral)     Recent Labs     03/27/22  0822 03/28/22  0020   WBC 9.6 9.4     Recent Labs     03/27/22  0822 03/28/22  0020   BUN 45* 51*  49*   CREATININE 2.0* 2.1*  2.1*     Estimated Creatinine Clearance: 29 mL/min (A) (based on SCr of 2.1 mg/dL (H)). Intake/Output Summary (Last 24 hours) at 3/28/2022 1649  Last data filed at 3/28/2022 0850  Gross per 24 hour   Intake 422.55 ml   Output 1075 ml   Net -652.45 ml       Pertinent Cultures:  Date    Source    Results  3/17   Blood    Klebsiella, Strep Viridans  3/23   Blood    Staph Capitis (2/2), CoNs (1 set)  3/25   Blood    NGTD    Vancomycin level:   TROUGH:  No results for input(s): VANCOTROUGH in the last 72 hours.   RANDOM:    Recent Labs     03/27/22 0822   VANCORANDOM 17.0       Assessment:  · SCr, BUN, and urine output:   · PAMELLA on CKD IV  · Scr trends stable  · Day(s) of therapy: 5  · Vancomycin concentration:  · 2/25: 21.6, re-dosed with 1000 mg  · 3/27: 17, appropriate to re-dose    Plan:  · Intermittent vancomycin dosing based on levels while in PAMELLA  · No vancomycin is due today  · Repeat level tomorrow AM  · Pharmacy will continue to monitor patient and adjust therapy as indicated    VANCOMYCIN CONCENTRATION SCHEDULED FOR 3/29 @ 0600    Thank you for the consult,  Monica Yoo [address] [place] [address], PharmD  3/28/2022 4:49 PM

## 2022-03-28 NOTE — CARE COORDINATION
Received call from [facility] stating patient is medically ready for d/c. There is a bed available in ARU today. Requested Rapid Covid now for pre-transfer testing requirements. Dr. Crispin Alatorre agrees that patient is medically ready to transfer to ARU.

## 2022-03-28 NOTE — PROGRESS NOTES
Progress Note( Dr. Scanlon Smoker)  3/28/2022  Subjective:   Admit Date: 3/16/2022  PCP: Juan Alberto Zarco MD    Admitted For :Sepsis from UTI generalized  weakness    Consulted For: Better control of blood glucose    Interval History: feels better  Still feels weak  Lab review the patient has mild hypothyroidism elevated TSH level    Denies any chest pains,   Denies SOB . Denies nausea or vomiting. No new bowel or bladder symptoms. Intake/Output Summary (Last 24 hours) at 3/28/2022 1936  Last data filed at 3/28/2022 1855  Gross per 24 hour   Intake 422.55 ml   Output 1875 ml   Net -1452.45 ml       DATA    CBC:   Recent Labs     03/27/22  0822 03/28/22  0020   WBC 9.6 9.4   HGB 8.3* 8.1*   * 337    CMP:  Recent Labs     03/27/22  0822 03/28/22  0020    135  135   K 4.6 4.9  4.9   CL 99 100  100   CO2 21 22  22   BUN 45* 51*  49*   CREATININE 2.0* 2.1*  2.1*   CALCIUM 8.6 8.7  8.6   LABALBU  --  3.1*     Lipids: No results found for: CHOL, HDL, TRIG  Glucose:  Recent Labs     03/28/22  1044 03/28/22  1238 03/28/22  1832   POCGLU 255* 241* 328*     [id number]:  Lab Results   Component Value Date    LABA1C 9.2 03/22/2022     High Sensitivity TSH:   Lab Results   Component Value Date    TSHHS 6.190 03/25/2022     Free T3: No results found for: FT3  Free T4:  Lab Results   Component Value Date    T4FREE 1.15 03/27/2022       US HEAD NECK SOFT TISSUE THYROID   Final Result   Mild changes of chronic thyroiditis. CT HEAD WO CONTRAST   Final Result   1. No acute intracranial abnormality. 2. Stable moderate chronic white matter microvascular ischemic changes and   chronic lacunar infarct in the right basal ganglia. VL DUP LOWER EXTREMITY VENOUS BILATERAL   Final Result   No evidence of DVT in either lower extremity. XR HIP 2-3 VW W PELVIS LEFT   Final Result   No acute abnormality or arthropathy of the hip.          CT ABDOMEN PELVIS WO CONTRAST Additional Contrast? None Final Result   1. Well-positioned left ureteric stent with no evidence of hydronephrosis,   renal abscess or other acute abnormality. Normal right kidney. 2. Minimal atelectatic changes in both lower lobes. 3. Well-positioned suprapubic catheter in the urinary bladder. FL LESS THAN 1 HOUR   Final Result      CT ABDOMEN PELVIS WO CONTRAST Additional Contrast? None   Final Result   1. There is left-sided hydronephrosis and hydroureter despite the presence   of the left ureteral stent. Increased left perinephric stranding and fluid   compared to the right side. Will need to correlate for functionality of the   stent versus ascending urinary tract infection. 2.  The urinary bladder is collapsed around the suprapubic catheter. The   distal coil of the ureteral stent courses into the urinary bladder along the   inferior aspect or an expanded upper portion of the urethral junction. Previous prostate surgery is again noted. 3.  Constipation in the ascending and transverse colon. No small bowel   obstruction or pneumoperitoneum. 4.  Mild thickening of the distal esophagus may relate to reflux esophagitis. CT HEAD WO CONTRAST   Final Result   No acute intracranial abnormality. MRI may be obtained if clinically   indicated. XR CHEST PORTABLE   Final Result   1. Worsening pulmonary edema.               Scheduled Medicines   Medications:    glipiZIDE  15 mg Oral BID AC    doxycycline hyclate  100 mg Oral 2 times per day    alogliptin  6.25 mg Oral Daily    insulin glargine  20 Units SubCUTAneous Nightly    insulin lispro  0-12 Units SubCUTAneous Nightly    carvedilol  3.125 mg Oral BID WC    hydrALAZINE  25 mg Oral 3 times per day    levothyroxine  50 mcg Oral Daily    insulin lispro  0-12 Units SubCUTAneous TID WC    mirtazapine  15 mg Oral Nightly    vancomycin (VANCOCIN) intermittent dosing (placeholder)   Other RX Placeholder    aspirin  81 mg Oral Daily    cephALEXin  500 mg Oral 4 times per day    atorvastatin  40 mg Oral Nightly    polyethylene glycol  17 g Oral Daily    sodium chloride flush  5-40 mL IntraVENous 2 times per day    heparin (porcine)  5,000 Units SubCUTAneous 3 times per day    allopurinol  100 mg Oral Daily    pregabalin  50 mg Oral BID      Infusions:    dextrose      sodium chloride 25 mL (03/24/22 4842)         Objective:   Vitals: /61   Pulse 83   Temp 97.6 °F (36.4 °C) (Oral)   Resp 17   Ht 6' 0.01\" (1.829 m)   Wt 201 lb (91.2 kg)   SpO2 100%   BMI 27.25 kg/m²   General appearance: alert and cooperative with exam  Neck: no JVD or bruit  Thyroid : Normal lobes   Lungs: Has Vesicular Breath sounds   Heart:  regular rate and rhythm  Abdomen: soft, non-tender; bowel sounds normal; no masses,  no organomegaly has Surapubic Catheter   Musculoskeletal: Normal  Extremities: extremities normal, , no edema  Neurologic:  Awake, alert, oriented to name, place and time. Cranial nerves II-XII are grossly intact. Motor weakness . Sensory is intact. ,  and gait is abnormal.unstable     Assessment:     Patient Active Problem List:     Gait disturbance     Generalized weakness     Diabetes mellitus (HCC)     Osteoarthritis     Anemia of chronic disorder     Infected implanted bladder sphincter cuff     Escherichia coli urinary tract infection     Hypertension     Sepsis ([address]     Type 2 diabetes mellitus with hypoglycemia without coma ([address]     UTI (urinary tract infection) due to urinary indwelling catheter (HCC)     Hyperkalemia     Acute kidney failure (HCC)     Leg weakness, bilateral     Type 2 diabetes mellitus with neurological manifestations, uncontrolled ([address]     Complicated UTI (urinary tract infection)     Essential hypertension     Severe muscle deconditioning     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 (HCC)     Frequent falls     Chronic kidney disease, stage 3a ([address]     Uncontrolled type 2 diabetes mellitus with peripheral neuropathy (HCC)     Prostate cancer ([facility]     Suprapubic catheter ([facility]     Sepsis secondary to UTI ([facility]      Plan:     1. Reviewed POC blood glucose . Labs and X ray results   2. Reviewed Current Medicines   3. On meal/ Correction bolus Humalog/ Basal Lantus Insulin regime / and   4. Monitor Blood glucose frequently   5. Modified  the dose of Insulin/ other medicines as needed  6. Ordered ultrasound of thyroid gland other thyroid function  7. On low-dose Synthroid of 50 mg/day  8. Waiting for insurance approval for transfer to skilled nursing unit  9. Will follow     .      Katalina Stauffer MD, MD

## 2022-03-29 LAB
ALBUMIN SERPL-MCNC: 3 GM/DL (ref 3.4–5)
ALP BLD-CCNC: 111 IU/L (ref 40–129)
ALT SERPL-CCNC: 13 U/L (ref 10–40)
ANION GAP SERPL CALCULATED.3IONS-SCNC: 13 MMOL/L (ref 4–16)
AST SERPL-CCNC: 14 IU/L (ref 15–37)
BACTERIA: NEGATIVE /HPF
BASOPHILS ABSOLUTE: 0.1 K/CU MM
BASOPHILS RELATIVE PERCENT: 0.6 % (ref 0–1)
BILIRUB SERPL-MCNC: 0.2 MG/DL (ref 0–1)
BILIRUBIN URINE: NEGATIVE MG/DL
BLOOD, URINE: ABNORMAL
BUN BLDV-MCNC: 53 MG/DL (ref 6–23)
CALCIUM SERPL-MCNC: 8.8 MG/DL (ref 8.3–10.6)
CHLORIDE BLD-SCNC: 103 MMOL/L (ref 99–110)
CLARITY: ABNORMAL
CO2: 21 MMOL/L (ref 21–32)
COLOR: YELLOW
CREAT SERPL-MCNC: 2.3 MG/DL (ref 0.9–1.3)
CREATININE URINE: 58.8 MG/DL (ref 39–259)
DIFFERENTIAL TYPE: ABNORMAL
DOSE AMOUNT: NORMAL
DOSE TIME: NORMAL
EOSINOPHILS ABSOLUTE: 0.6 K/CU MM
EOSINOPHILS RELATIVE PERCENT: 5.5 % (ref 0–3)
GFR AFRICAN AMERICAN: 33 ML/MIN/1.73M2
GFR NON-AFRICAN AMERICAN: 27 ML/MIN/1.73M2
GLUCOSE BLD-MCNC: 168 MG/DL (ref 70–99)
GLUCOSE BLD-MCNC: 187 MG/DL (ref 70–99)
GLUCOSE BLD-MCNC: 188 MG/DL (ref 70–99)
GLUCOSE BLD-MCNC: 204 MG/DL (ref 70–99)
GLUCOSE BLD-MCNC: 234 MG/DL (ref 70–99)
GLUCOSE BLD-MCNC: 234 MG/DL (ref 70–99)
GLUCOSE, URINE: 100 MG/DL
HCT VFR BLD CALC: 25.2 % (ref 42–52)
HEMOCCULT SP1 STL QL: NEGATIVE
HEMOGLOBIN: 7.9 GM/DL (ref 13.5–18)
IMMATURE NEUTROPHIL %: 0.6 % (ref 0–0.43)
KETONES, URINE: NEGATIVE MG/DL
LEUKOCYTE ESTERASE, URINE: ABNORMAL
LV EF: 55 %
LVEF MODALITY: NORMAL
LYMPHOCYTES ABSOLUTE: 2 K/CU MM
LYMPHOCYTES RELATIVE PERCENT: 17.8 % (ref 24–44)
MAGNESIUM: 2.1 MG/DL (ref 1.8–2.4)
MCH RBC QN AUTO: 29.7 PG (ref 27–31)
MCHC RBC AUTO-ENTMCNC: 31.3 % (ref 32–36)
MCV RBC AUTO: 94.7 FL (ref 78–100)
MONOCYTES ABSOLUTE: 0.8 K/CU MM
MONOCYTES RELATIVE PERCENT: 7 % (ref 0–4)
MUCUS: ABNORMAL HPF
NITRITE URINE, QUANTITATIVE: NEGATIVE
NUCLEATED RBC %: 0 %
PDW BLD-RTO: 15.4 % (ref 11.7–14.9)
PH, URINE: 5.5 (ref 5–8)
PHOSPHORUS: 5 MG/DL (ref 2.5–4.9)
PLATELET # BLD: 488 K/CU MM (ref 140–440)
PMV BLD AUTO: 10.8 FL (ref 7.5–11.1)
POTASSIUM SERPL-SCNC: 5 MMOL/L (ref 3.5–5.1)
PRO-BNP: 856.2 PG/ML
PROT/CREAT RATIO, UR: 1.1
PROTEIN UA: ABNORMAL MG/DL
RBC # BLD: 2.66 M/CU MM (ref 4.6–6.2)
RBC URINE: 56 /HPF (ref 0–3)
SEGMENTED NEUTROPHILS ABSOLUTE COUNT: 7.7 K/CU MM
SEGMENTED NEUTROPHILS RELATIVE PERCENT: 68.5 % (ref 36–66)
SODIUM BLD-SCNC: 137 MMOL/L (ref 135–145)
SODIUM URINE: 77 MMOL/L (ref 35–167)
SPECIFIC GRAVITY UA: 1.01 (ref 1–1.03)
THYROTROPIN BINDING INHIBITORY IMMUNOGLOBULIN: <0.8 IU/L
TOTAL IMMATURE NEUTOROPHIL: 0.07 K/CU MM
TOTAL NUCLEATED RBC: 0 K/CU MM
TOTAL PROTEIN: 5.8 GM/DL (ref 6.4–8.2)
TRICHOMONAS: ABNORMAL /HPF
UNCLASSIFIED CAST: 9 /LPF
URINE TOTAL PROTEIN: 67.5 MG/DL
UROBILINOGEN, URINE: 0.2 MG/DL (ref 0.2–1)
VANCOMYCIN RANDOM: 18.1 UG/ML
WBC # BLD: 11.2 K/CU MM (ref 4–10.5)
WBC UA: ABNORMAL /HPF (ref 0–2)
YEAST: ABNORMAL /HPF

## 2022-03-29 PROCEDURE — 6370000000 HC RX 637 (ALT 250 FOR IP): Performed by: INTERNAL MEDICINE

## 2022-03-29 PROCEDURE — 85025 COMPLETE CBC W/AUTO DIFF WBC: CPT

## 2022-03-29 PROCEDURE — 94761 N-INVAS EAR/PLS OXIMETRY MLT: CPT

## 2022-03-29 PROCEDURE — 85018 HEMOGLOBIN: CPT

## 2022-03-29 PROCEDURE — 2580000003 HC RX 258: Performed by: UROLOGY

## 2022-03-29 PROCEDURE — 82962 GLUCOSE BLOOD TEST: CPT

## 2022-03-29 PROCEDURE — 83735 ASSAY OF MAGNESIUM: CPT

## 2022-03-29 PROCEDURE — 80053 COMPREHEN METABOLIC PANEL: CPT

## 2022-03-29 PROCEDURE — 81001 URINALYSIS AUTO W/SCOPE: CPT

## 2022-03-29 PROCEDURE — 84156 ASSAY OF PROTEIN URINE: CPT

## 2022-03-29 PROCEDURE — 1200000000 HC SEMI PRIVATE

## 2022-03-29 PROCEDURE — 6370000000 HC RX 637 (ALT 250 FOR IP): Performed by: FAMILY MEDICINE

## 2022-03-29 PROCEDURE — G0328 FECAL BLOOD SCRN IMMUNOASSAY: HCPCS

## 2022-03-29 PROCEDURE — 93306 TTE W/DOPPLER COMPLETE: CPT

## 2022-03-29 PROCEDURE — 82570 ASSAY OF URINE CREATININE: CPT

## 2022-03-29 PROCEDURE — 6360000002 HC RX W HCPCS: Performed by: HOSPITALIST

## 2022-03-29 PROCEDURE — 80202 ASSAY OF VANCOMYCIN: CPT

## 2022-03-29 PROCEDURE — 97530 THERAPEUTIC ACTIVITIES: CPT

## 2022-03-29 PROCEDURE — APPSS45 APP SPLIT SHARED TIME 31-45 MINUTES: Performed by: NURSE PRACTITIONER

## 2022-03-29 PROCEDURE — 85014 HEMATOCRIT: CPT

## 2022-03-29 PROCEDURE — 99222 1ST HOSP IP/OBS MODERATE 55: CPT | Performed by: SPECIALIST

## 2022-03-29 PROCEDURE — 6370000000 HC RX 637 (ALT 250 FOR IP): Performed by: HOSPITALIST

## 2022-03-29 PROCEDURE — 84300 ASSAY OF URINE SODIUM: CPT

## 2022-03-29 PROCEDURE — 51798 US URINE CAPACITY MEASURE: CPT

## 2022-03-29 PROCEDURE — 6370000000 HC RX 637 (ALT 250 FOR IP): Performed by: UROLOGY

## 2022-03-29 PROCEDURE — 83880 ASSAY OF NATRIURETIC PEPTIDE: CPT

## 2022-03-29 PROCEDURE — 97110 THERAPEUTIC EXERCISES: CPT

## 2022-03-29 PROCEDURE — 36415 COLL VENOUS BLD VENIPUNCTURE: CPT

## 2022-03-29 PROCEDURE — 2580000003 HC RX 258: Performed by: HOSPITALIST

## 2022-03-29 PROCEDURE — 6370000000 HC RX 637 (ALT 250 FOR IP): Performed by: NURSE PRACTITIONER

## 2022-03-29 PROCEDURE — 6360000002 HC RX W HCPCS: Performed by: UROLOGY

## 2022-03-29 PROCEDURE — 99232 SBSQ HOSP IP/OBS MODERATE 35: CPT | Performed by: INTERNAL MEDICINE

## 2022-03-29 PROCEDURE — 84100 ASSAY OF PHOSPHORUS: CPT

## 2022-03-29 RX ADMIN — ALOGLIPTIN 6.25 MG: 12.5 TABLET, FILM COATED ORAL at 09:09

## 2022-03-29 RX ADMIN — GLIPIZIDE 15 MG: 5 TABLET ORAL at 15:34

## 2022-03-29 RX ADMIN — CARVEDILOL 3.12 MG: 3.12 TABLET, FILM COATED ORAL at 09:10

## 2022-03-29 RX ADMIN — GLIPIZIDE 15 MG: 5 TABLET ORAL at 06:05

## 2022-03-29 RX ADMIN — CEPHALEXIN 500 MG: 500 CAPSULE ORAL at 15:34

## 2022-03-29 RX ADMIN — DOXYCYCLINE HYCLATE 100 MG: 100 TABLET, COATED ORAL at 09:10

## 2022-03-29 RX ADMIN — ALLOPURINOL 100 MG: 100 TABLET ORAL at 09:09

## 2022-03-29 RX ADMIN — CEPHALEXIN 500 MG: 500 CAPSULE ORAL at 17:59

## 2022-03-29 RX ADMIN — PREGABALIN 50 MG: 50 CAPSULE ORAL at 09:30

## 2022-03-29 RX ADMIN — ASPIRIN 81 MG CHEWABLE TABLET 81 MG: 81 TABLET CHEWABLE at 09:09

## 2022-03-29 RX ADMIN — SODIUM CHLORIDE 25 ML: 9 INJECTION, SOLUTION INTRAVENOUS at 15:31

## 2022-03-29 RX ADMIN — HEPARIN SODIUM 5000 UNITS: 5000 INJECTION INTRAVENOUS; SUBCUTANEOUS at 15:35

## 2022-03-29 RX ADMIN — LEVOTHYROXINE SODIUM 50 MCG: 0.05 TABLET ORAL at 06:07

## 2022-03-29 RX ADMIN — DOXYCYCLINE HYCLATE 100 MG: 100 TABLET, COATED ORAL at 21:57

## 2022-03-29 RX ADMIN — CEPHALEXIN 500 MG: 500 CAPSULE ORAL at 06:06

## 2022-03-29 RX ADMIN — CEPHALEXIN 500 MG: 500 CAPSULE ORAL at 00:05

## 2022-03-29 RX ADMIN — HEPARIN SODIUM 5000 UNITS: 5000 INJECTION INTRAVENOUS; SUBCUTANEOUS at 21:57

## 2022-03-29 RX ADMIN — INSULIN GLARGINE 20 UNITS: 100 INJECTION, SOLUTION SUBCUTANEOUS at 21:57

## 2022-03-29 RX ADMIN — POLYETHYLENE GLYCOL (3350) 17 G: 17 POWDER, FOR SOLUTION ORAL at 09:11

## 2022-03-29 RX ADMIN — SODIUM CHLORIDE, PRESERVATIVE FREE 10 ML: 5 INJECTION INTRAVENOUS at 22:04

## 2022-03-29 RX ADMIN — ATORVASTATIN CALCIUM 40 MG: 40 TABLET, FILM COATED ORAL at 21:57

## 2022-03-29 RX ADMIN — SODIUM CHLORIDE, PRESERVATIVE FREE 10 ML: 5 INJECTION INTRAVENOUS at 09:30

## 2022-03-29 RX ADMIN — HYDRALAZINE HYDROCHLORIDE 25 MG: 25 TABLET, FILM COATED ORAL at 15:34

## 2022-03-29 RX ADMIN — VANCOMYCIN HYDROCHLORIDE 1250 MG: 5 INJECTION, POWDER, LYOPHILIZED, FOR SOLUTION INTRAVENOUS at 15:32

## 2022-03-29 RX ADMIN — HYDRALAZINE HYDROCHLORIDE 25 MG: 25 TABLET, FILM COATED ORAL at 21:57

## 2022-03-29 RX ADMIN — PREGABALIN 50 MG: 50 CAPSULE ORAL at 21:57

## 2022-03-29 RX ADMIN — HEPARIN SODIUM 5000 UNITS: 5000 INJECTION INTRAVENOUS; SUBCUTANEOUS at 06:06

## 2022-03-29 RX ADMIN — HYDRALAZINE HYDROCHLORIDE 25 MG: 25 TABLET, FILM COATED ORAL at 06:07

## 2022-03-29 RX ADMIN — CARVEDILOL 3.12 MG: 3.12 TABLET, FILM COATED ORAL at 15:34

## 2022-03-29 RX ADMIN — MIRTAZAPINE 15 MG: 15 TABLET, ORALLY DISINTEGRATING ORAL at 21:56

## 2022-03-29 ASSESSMENT — PAIN SCALES - GENERAL
PAINLEVEL_OUTOF10: 0
PAINLEVEL_OUTOF10: 0
PAINLEVEL_OUTOF10: 5

## 2022-03-29 ASSESSMENT — PAIN DESCRIPTION - ORIENTATION: ORIENTATION: LEFT

## 2022-03-29 ASSESSMENT — PAIN DESCRIPTION - PAIN TYPE: TYPE: ACUTE PAIN

## 2022-03-29 ASSESSMENT — PAIN DESCRIPTION - LOCATION: LOCATION: ANKLE

## 2022-03-29 NOTE — PROGRESS NOTES
Igor Beal MD    Hospitalist Progress Note      Name:  Radha Garcia /Age/Sex: 1940  (78 y.o. male)   MRN & CSN:  [id number] & [id number] Admission Date/Time: 3/16/2022 11:35 PM   Location:  [address] [place] [address] PCP: Shubham Wright MD       I saw and examined the patient on 3/29/2022 at [address] [place] [address] Day: 14  Barriers to discharge: Antibiotics, consultation  Assessment and Plan:     78-year-old male initially admitted for UTI with sepsis, and subsequently developed an SVT, also found to have PAMELLA on baseline CKD stage IV and most recent (in last 24 hours) has had some improvement in his presentation, sitting up in bed, alert and oriented to self and place but not to time. Culture results noted. Patient complains of back pain but denies any fever or chills. Overnight patient was found to have markedly elevated BUN compared to creatinine and was started on IV fluids. However IV fluids have been discontinued by nephrologist and with the probable diagnosis of GI bleeding. Patient was asymptomatic when seen and examined and vital signs are stable and H&H remained stable. Of note patient has been approved for ARU     1. Sepsis secondary to UTI. Blood cultures on 3/17 growing Klebsiella pneumonia. Managed with 6 days of meropenem and 7 days of cephalexin. Repeat urine cultures negative. Will do total of 14 days. 2. SVT (supraventricular tachycardia). Patient being maintained on low-dose metoprolol with heart rate well controlled. Patient being followed closely with further recommendations and management by cardiology consult. 3. Possible GI Bleed. Patient's H&H remained stable; however with a marked elevation of BUN as against creatinine, there is the likely indication of occult GI bleed as per nephrology consult input. We will therefore obtain stool guaiac x3, GI recommendations noted and appreciated.       4. Acute kidney failure (HCC) on baseline Chronic kidney disease (CKD), stage IV (severe) ([facility] Patient is being closely monitored and managed by nephrology consult with further recommendations. 5. 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. Continue with patient's Lipitor dose of 40 mg nightly. 6. Coagulase negative Staphylococcus bacteremia. Final culture results showing Staphylococcus Capitis, sensitive to doxycycline and therefore has been added to his regimen. Discontinue vancomycin. Will complete total of 14 days due to above     7. Acute metabolic encephalopathy. Markedly improved with patient alert and oriented to self and place but not to time, giving meaningful history and following commands. Will continue with current management and patient will benefit from placement in the [facility] for rehabilitation and recuperation. Heparin for DVT prophylaxis  Full code       Subjective:     Patient seen and examined at bedside. Reports feeling slightly better this morning. No acute events overnight. Had a bowel movement this morning but unsure of its color. Tolerating p.o. diet    Objective: Intake/Output Summary (Last 24 hours) at 3/29/2022 0814  Last data filed at 3/28/2022 1855  Gross per 24 hour   Intake 5 ml   Output 800 ml   Net -795 ml      Vitals:   Vitals:    03/29/22 0730   BP: 136/61   Pulse: 77   Resp: 20   Temp: 98.6 °F (37 °C)   SpO2: 98%       Ten point ROS reviewed negative, unless as noted above    Physical Exam:     GENERAL APPEARANCE: not in any acute distress,  appears comfortable. NECK: Supple, no JVD.   CARDIOVASCULAR: Regular rate and rhythm, no murmurs, rubs or gallops  LUNGS: clear to auscultation bilaterally   ABDOMEN: normoactive bowel sounds, soft non-tender and non distended  EXTREMITIES: No edema  MUSCULOSKELETAL S: normal movement and normal bulk in all ext  NEUROLOGIC: Awake, following simple commands.- grossly non focal exam, moving all extremities   SKIN: No Rashes       Electronically signed by Mohsen Corona MD on 3/29/2022 at 8:14 AM Medications:   Medications:    glipiZIDE  15 mg Oral BID AC    doxycycline hyclate  100 mg Oral 2 times per day    alogliptin  6.25 mg Oral Daily    insulin glargine  20 Units SubCUTAneous Nightly    insulin lispro  0-12 Units SubCUTAneous Nightly    carvedilol  3.125 mg Oral BID WC    hydrALAZINE  25 mg Oral 3 times per day    levothyroxine  50 mcg Oral Daily    insulin lispro  0-12 Units SubCUTAneous TID WC    mirtazapine  15 mg Oral Nightly    vancomycin (VANCOCIN) intermittent dosing (placeholder)   Other RX Placeholder    aspirin  81 mg Oral Daily    cephALEXin  500 mg Oral 4 times per day    atorvastatin  40 mg Oral Nightly    polyethylene glycol  17 g Oral Daily    sodium chloride flush  5-40 mL IntraVENous 2 times per day    heparin (porcine)  5,000 Units SubCUTAneous 3 times per day    allopurinol  100 mg Oral Daily    pregabalin  50 mg Oral BID      Infusions:    dextrose      sodium chloride 25 mL (03/24/22 6239)     PRN Meds: oxyCODONE, 5 mg, Q4H PRN  glucose, 15 g, PRN  glucagon (rDNA), 1 mg, PRN  dextrose, 100 mL/hr, PRN  dextrose bolus (hypoglycemia), 125 mL, PRN   Or  dextrose bolus (hypoglycemia), 250 mL, PRN  HYDROmorphone, 0.5 mg, Q4H PRN  sodium chloride flush, 5-40 mL, PRN  sodium chloride, 25 mL, PRN  ondansetron, 4 mg, Q8H PRN   Or  ondansetron, 4 mg, Q6H PRN  polyethylene glycol, 17 g, Daily PRN  acetaminophen, 650 mg, Q6H PRN   Or  acetaminophen, 650 mg, Q6H PRN

## 2022-03-29 NOTE — PROGRESS NOTES
Physical Therapy  Name: Pastor Tony MRN: [id number] :   1940   Date:  3/29/2022   Admission Date: 3/16/2022 Room:  [address] [place] [address]   Restrictions/Precautions:  Restrictions/Precautions  Restrictions/Precautions: General Precautions,Fall Risk       Communication with other providers: Handoff to Lynne Barnett RN   Subjective:  Patient states:  He is agreeable for therapy  Pain:   Location, Type, Intensity (0/10 to 10/10):  denies  Objective:    Observation:  Patient in high fowlers eating dinner. Additional time with clean up d/t Bm during SPT  Treatment, including education/measures:  Supine Exercises: Ankle pumps x 15  Quad sets x 15  Glute sets x Partial bridges today x10  Heel slides x 15  SLR x10 AAROM  Therapeutic Exercise:  Therapeutic exercises were instructed today. Cues were given for technique, safety, recruitment, and rationale. Cues were verbal and/or tactile. Transfers with line management of IV, Tele Monitor, Pulse Ox, Suprapubic catheter, NC O2  Rolling: Mod A x1 ea direction, Patient is able to partially abduct hip in sidelying for cleanup  Supine to sit :Min-Mod A x1  Sit to supine : Mod A with BLE, ~3' rest break to recover from fatigue  Scooting :SBA  Sit to stand :Min-Mod A x1 from EOB and recliner  Standing balance: Fair- at Min-Mod A, required BUE support into RW, cues for forward weight shift  Stand to sit :Min A for safe guidance to transfer surface  SPT:Mod A x1 EOB<>Recliner x2sets, ~3' rest break d/t fatigue between sets, assist mostly d/t retropulsion with fatigue, manual and tactile cues for walker management, Mod cues for BLE advancement and RW sequence. Safety  Patient left safely in the bed, with call light/phone in reach with alarm applied. Gait belt and mask were used for transfers and gait.   Assessment / Impression:     Patient's tolerance of treatment:  Good   Adverse Reaction: none  Significant change in status and impact:  none  Barriers to improvement:  patient  Plan for Next Session:    Will cont to work towards pt's goals per patient tolerance  Time in:  1713  Time out:  1751  Timed treatment minutes:  38  Total treatment time:  45    Previously filed items:  Social/Functional History  Lives With: Son  Type of Home: House  Home Layout: One level  Home Access: Stairs to enter with rails  Entrance Stairs - Number of Steps: 2  Bathroom Shower/Tub: Tub/Shower unit  Bathroom Toilet: Standard  Bathroom Equipment: Grab bars in [facility] chair  Home Equipment: 60 Giggle walker  ADL Assistance: [address]: Independent (mod I with 4ww)  Transfer Assistance: Independent     Long term goals  Time Frame for Long term goals : In one week:  Long term goal 1: Pt will complete all bed mobility with minAx1  Long term goal 2: Pt will complete sit <> stand transfers with modAx2  Long term goal 3: Pt will complete bed <> chair transfers with modAx2  Long term goal 4: Pt will ambulate 10 feet with modAx2 with LRAD  Long term goal 5: Pt will independently complete 3 sets of 10 reps of BLE AROM exercises in available and allowed ROM    Electronically signed by:     Richy Wasserman, DION  3/29/2022, 5:56 PM

## 2022-03-29 NOTE — PROGRESS NOTES
[address] [facility]  consulted by Dr. Nenita Pittman for monitoring and adjustment. Indication for treatment: Possible bacteremia  Goal trough: [] 10-15 mcg/mL or [x] 15-20 mcg/ml  AUC/USMAN: [] <500 or [x] 400-600    Pertinent Laboratory Values:   Temp Readings from Last 3 Encounters:   03/29/22 98.6 °F (37 °C) (Oral)   01/06/22 99.3 °F (37.4 °C) (Oral)   11/28/21 98.1 °F (36.7 °C) (Oral)     Recent Labs     03/27/22  0822 03/28/22 0020 03/29/22  0726   WBC 9.6 9.4 11.2*     Recent Labs     03/27/22 0822 03/28/22  0020 03/29/22  0726   BUN 45* 51*  49* 53*   CREATININE 2.0* 2.1*  2.1* 2.3*     Estimated Creatinine Clearance: 27 mL/min (A) (based on SCr of 2.3 mg/dL (H)). Intake/Output Summary (Last 24 hours) at 3/29/2022 1037  Last data filed at 3/28/2022 1855  Gross per 24 hour   Intake --   Output 800 ml   Net -800 ml       Pertinent Cultures:  Date    Source    Results  3/17   Blood    Klebsiella, Strep Viridans  3/23   Blood    Staph Capitis (2/2), CoNs (1 set)  3/25   Blood    NGTD    Vancomycin level:   TROUGH:  No results for input(s): VANCOTROUGH in the last 72 hours.   RANDOM:    Recent Labs     03/27/22 0822 03/29/22  0726   VANCORANDOM 17.0 18.1       Assessment:  · SCr, BUN, and urine output:   · PAMELLA on CKD IV  · Scr trending up slightly  · Day(s) of therapy: 6  · Vancomycin concentration:  · 2/25: 21.6, re-dosed with 1000 mg  · 3/27: 17, appropriate to re-dose  · 3/29: 18.1, appropriate to re-dose    Plan:  · Intermittent vancomycin dosing based on levels while in PAMELLA  · Based on level, re-dose with 1250 mg x1 this afternoon  · Repeat next level in 48h per previous trends  · Pharmacy will continue to monitor patient and adjust therapy as indicated    VANCOMYCIN CONCENTRATION SCHEDULED FOR 3/31 @ 0600    Thank you for the consult,  Fco Matamoros [facility], PharmD  3/29/2022 10:37 AM

## 2022-03-29 NOTE — PROGRESS NOTES
Progress Note( Dr. Hemanth Gee)  3/29/2022  Subjective:   Admit Date: 3/16/2022  PCP: Delaney Mead MD    Admitted For :Sepsis from UTI generalized  weakness    Consulted For: Better control of blood glucose    Interval History: feels better  Still feels weak  Lab review the patient has mild hypothyroidism elevated TSH level    Pt Hb/Hct  drpongoc to zz< 8 GI was consulted for ppossile GI bleeding     Denies any chest pains,   Denies SOB . Denies nausea or vomiting. No new bowel or bladder symptoms. No intake or output data in the 24 hours ending 03/29/22 1950    DATA    CBC:   Recent Labs     03/27/22  0822 03/28/22  0020 03/29/22  0726   WBC 9.6 9.4 11.2*   HGB 8.3* 8.1* 7.9*   * 337 488*    CMP:  Recent Labs     03/27/22  0822 03/28/22  0020 03/29/22  0726    135  135 137   K 4.6 4.9  4.9 5.0   CL 99 100  100 103   CO2 21 22  22 21   BUN 45* 51*  49* 53*   CREATININE 2.0* 2.1*  2.1* 2.3*   CALCIUM 8.6 8.7  8.6 8.8   PROT  --   --  5.8*   LABALBU  --  3.1* 3.0*   BILITOT  --   --  0.2   ALKPHOS  --   --  111   AST  --   --  14*   ALT  --   --  13     Lipids: No results found for: CHOL, HDL, TRIG  Glucose:  Recent Labs     03/29/22  0631 03/29/22  1101 03/29/22  1648   POCGLU 204* 187* 234*     JaxecdmrybP9G:  Lab Results   Component Value Date    LABA1C 9.2 03/22/2022     High Sensitivity TSH:   Lab Results   Component Value Date    TSHHS 6.190 03/25/2022     Free T3: No results found for: FT3  Free T4:  Lab Results   Component Value Date    T4FREE 1.15 03/27/2022       US HEAD NECK SOFT TISSUE THYROID   Final Result   Mild changes of chronic thyroiditis. CT HEAD WO CONTRAST   Final Result   1. No acute intracranial abnormality. 2. Stable moderate chronic white matter microvascular ischemic changes and   chronic lacunar infarct in the right basal ganglia. VL DUP LOWER EXTREMITY VENOUS BILATERAL   Final Result   No evidence of DVT in either lower extremity.          XR HIP 2-3 VW W PELVIS LEFT   Final Result   No acute abnormality or arthropathy of the hip. CT ABDOMEN PELVIS WO CONTRAST Additional Contrast? None   Final Result   1. Well-positioned left ureteric stent with no evidence of hydronephrosis,   renal abscess or other acute abnormality. Normal right kidney. 2. Minimal atelectatic changes in both lower lobes. 3. Well-positioned suprapubic catheter in the urinary bladder. FL LESS THAN 1 HOUR   Final Result      CT ABDOMEN PELVIS WO CONTRAST Additional Contrast? None   Final Result   1. There is left-sided hydronephrosis and hydroureter despite the presence   of the left ureteral stent. Increased left perinephric stranding and fluid   compared to the right side. Will need to correlate for functionality of the   stent versus ascending urinary tract infection. 2.  The urinary bladder is collapsed around the suprapubic catheter. The   distal coil of the ureteral stent courses into the urinary bladder along the   inferior aspect or an expanded upper portion of the urethral junction. Previous prostate surgery is again noted. 3.  Constipation in the ascending and transverse colon. No small bowel   obstruction or pneumoperitoneum. 4.  Mild thickening of the distal esophagus may relate to reflux esophagitis. CT HEAD WO CONTRAST   Final Result   No acute intracranial abnormality. MRI may be obtained if clinically   indicated. XR CHEST PORTABLE   Final Result   1. Worsening pulmonary edema.               Scheduled Medicines   Medications:    glipiZIDE  15 mg Oral BID AC    doxycycline hyclate  100 mg Oral 2 times per day    alogliptin  6.25 mg Oral Daily    insulin glargine  20 Units SubCUTAneous Nightly    insulin lispro  0-12 Units SubCUTAneous Nightly    carvedilol  3.125 mg Oral BID WC    hydrALAZINE  25 mg Oral 3 times per day    levothyroxine  50 mcg Oral Daily    insulin lispro  0-12 Units SubCUTAneous TID   mirtazapine  15 mg Oral Nightly    aspirin  81 mg Oral Daily    cephALEXin  500 mg Oral 4 times per day    atorvastatin  40 mg Oral Nightly    polyethylene glycol  17 g Oral Daily    sodium chloride flush  5-40 mL IntraVENous 2 times per day    heparin (porcine)  5,000 Units SubCUTAneous 3 times per day    allopurinol  100 mg Oral Daily    pregabalin  50 mg Oral BID      Infusions:    dextrose      sodium chloride 25 mL (03/29/22 9821)         Objective:   Vitals: /60   Pulse 72   Temp 98.6 °F (37 °C) (Oral)   Resp 19   Ht 6' 0.01\" (1.829 m)   Wt 201 lb (91.2 kg)   SpO2 98%   BMI 27.25 kg/m²   General appearance: alert and cooperative with exam  Neck: no JVD or bruit  Thyroid : Normal lobes   Lungs: Has Vesicular Breath sounds   Heart:  regular rate and rhythm  Abdomen: soft, non-tender; bowel sounds normal; no masses,  no organomegaly has Surapubic Catheter   Musculoskeletal: Normal  Extremities: extremities normal, , no edema  Neurologic:  Awake, alert, oriented to name, place and time. Cranial nerves II-XII are grossly intact. Motor weakness . Sensory is intact. ,  and gait is abnormal.unstable     Assessment:     Patient Active Problem List:     Gait disturbance     Generalized weakness     Diabetes mellitus (HCC)     Osteoarthritis     Anemia of chronic disorder     Infected implanted bladder sphincter cuff     Escherichia coli urinary tract infection     Hypertension     Sepsis ([address]     Type 2 diabetes mellitus with hypoglycemia without coma ([address]     UTI (urinary tract infection) due to urinary indwelling catheter (HCC)     Hyperkalemia     Acute kidney failure (HCC)     Leg weakness, bilateral     Type 2 diabetes mellitus with neurological manifestations, uncontrolled ([address]     Complicated UTI (urinary tract infection)     Essential hypertension     Severe muscle deconditioning     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 (HCC)     Frequent falls     Chronic kidney disease, stage 3a ([facility]     Uncontrolled type 2 diabetes mellitus with peripheral neuropathy ([facility]     Prostate cancer ([facility]     Suprapubic catheter ([facility]     Sepsis secondary to UTI ([facility]      Plan:     1. Reviewed POC blood glucose . Labs and X ray results   2. Reviewed Current Medicines   3. On meal/ Correction bolus Humalog/ Basal Lantus Insulin regime / and   4. Monitor Blood glucose frequently   5. Modified  the dose of Insulin/ other medicines as needed  6. Ordered ultrasound of thyroid gland other thyroid function  7. On low-dose Synthroid of 50 mg/day  8. Waiting for insurance approval for transfer to skilled nursing unit  9. Will follow     .      Theresa Alarcon MD, MD

## 2022-03-29 NOTE — PROGRESS NOTES
Nephrology Progress Note  3/29/2022 12:37 PM        Subjective:   Admit Date: 3/16/2022  PCP: Shubham Wright MD    Interval History: Patient seen in early morning, this is a late entry    Diet: Reasonable oral intake    ROS: Still has some weakness and fatigue  He was alert awake and oriented  No overt confusion  His Hopper is out  No fever      Data:     Current meds:    vancomycin  1,250 mg IntraVENous Once    glipiZIDE  15 mg Oral BID AC    doxycycline hyclate  100 mg Oral 2 times per day    alogliptin  6.25 mg Oral Daily    insulin glargine  20 Units SubCUTAneous Nightly    insulin lispro  0-12 Units SubCUTAneous Nightly    carvedilol  3.125 mg Oral BID WC    hydrALAZINE  25 mg Oral 3 times per day    levothyroxine  50 mcg Oral Daily    insulin lispro  0-12 Units SubCUTAneous TID WC    mirtazapine  15 mg Oral Nightly    vancomycin (VANCOCIN) intermittent dosing (placeholder)   Other RX Placeholder    aspirin  81 mg Oral Daily    cephALEXin  500 mg Oral 4 times per day    atorvastatin  40 mg Oral Nightly    polyethylene glycol  17 g Oral Daily    sodium chloride flush  5-40 mL IntraVENous 2 times per day    heparin (porcine)  5,000 Units SubCUTAneous 3 times per day    allopurinol  100 mg Oral Daily    pregabalin  50 mg Oral BID      dextrose      sodium chloride 25 mL (03/24/22 1737)         I/O last 3 completed shifts: In: 422.6 [P.O.:200;  I.V.:5; IV Piggyback:217.6]  Out: 1875 [INYMZ:3500]    CBC:   Recent Labs     03/27/22  0822 03/28/22  0020 03/29/22  0726   WBC 9.6 9.4 11.2*   HGB 8.3* 8.1* 7.9*   * 337 488*          Recent Labs     03/27/22  0822 03/28/22  0020 03/29/22  0726    135  135 137   K 4.6 4.9  4.9 5.0   CL 99 100  100 103   CO2 21 22 22 21   BUN 45* 51*  49* 53*   CREATININE 2.0* 2.1*  2.1* 2.3*   GLUCOSE 220* 261*  259* 188*       Lab Results   Component Value Date    CALCIUM 8.8 03/29/2022    PHOS 5.0 (H) 03/29/2022       Objective:     Vitals: /61   Pulse 77   Temp 98.6 °F (37 °C) (Oral)   Resp 20   Ht 6' 0.01\" (1.829 m)   Wt 201 lb (91.2 kg)   SpO2 98%   BMI 27.25 kg/m²     General appearance: Alert, awake and oriented  HEENT: Positive conjunctival pallor  Neck: Supple  Lungs: No gross crackles  Heart: Regular this morning  Abdomen: Soft, slightly distended  Extremities: Positive edema      Problem List :         Impression :     1. Acute kidney injury with underlying CKD stage IV A3-need to make sure bladder is not obstructed-high BUN could be from occult bleeding-he also has risk for acute interstitial nephritis  2. Sepsis with gram-negative colton-he does have ureteral stent-for recurrent right ureteropelvic junction stricture  3. Also history of hypertension, diabetes and dysrhythmia    Recommendation/Plan  :     1. Bladder scan  2. UA and urinary indicis  3. Discontinue any nonessential medication  4. Revisit the duration of antibiotic  5. Also rule out any occult bleeding  6.  Follow clinically and biochemically      Poornima Sanchez MD MD

## 2022-03-29 NOTE — CONSULTS
Department of Internal Medicine  Gastroenterology Consult Note  Jaden Garsia. Kenneth ARNDT      Reason for Consult:  Possible GI bleed    Primary Care Physician:  Juan Alberto Zarco MD    History Obtained From:  patient    CC: possible GI bleed    HISTORY OF PRESENT ILLNESS:              The patient is a 78 y.o.  male admitted with UTI with sepsis. I was asked to see for possible GI bleeding as the patient is anemic and has an elevated BUN/creat - although the BUN/creat ratio is really only 24- more suspicious for UGI bleed when > 30. The patient has been constipated recently since admission but denies melena, hematochezia, nausea or vomiting. He says he had a colonoscopy in [facility] within the past 4-5 years ago and saw his GI Dr last December. Past Medical History:        Diagnosis Date    Acute urinary tract infection 3/15/2012    Ataxia 2010    Diabetes mellitus ([address] 2011    type 2, controlled    Fusion of spine of cervical region 10/2012    Gait disturbance 2011    History of prostate cancer 1996    adenocarcinoma    History of tobacco use 1959    Hyperlipidemia 2011    Osteoarthritis 2011       Past Surgical History:        Procedure Laterality Date    BLADDER SURGERY      BLADDER SURGERY Left 3/17/2022    CYSTOSCOPY LEFT STENT EXCHANGE performed by Ashia Edmonds MD at [place] [address]  3/28/13    Cysto with removal of artificial sphinter and placement of suprapubic catheter. [facility]      PROSTATECTOMY  1996    infection       Medications Prior to Admission:    Prior to Admission medications    Medication Sig Start Date End Date Taking? Authorizing Provider   allopurinol (ZYLOPRIM) 100 MG tablet Take 100 mg by mouth daily   Yes Historical Provider, MD   atorvastatin (LIPITOR) 80 MG tablet Take 80 mg by mouth daily   Yes Historical Provider, MD   pregabalin (LYRICA) 50 MG capsule Take 50 mg by mouth 2 times daily.    Yes Historical Provider, MD albuterol sulfate HFA (PROVENTIL HFA) 108 (90 Base) MCG/ACT inhaler Inhale 2 puffs into the lungs every 4 hours as needed for Wheezing or Shortness of Breath With spacer (and mask if indicated). Thanks. 1/6/22 2/5/22  Christiano Schulte DO   acetaminophen (TYLENOL) 325 MG tablet Take 2 tablets by mouth every 6 hours as needed for Pain 1/6/22   Christiano Schulte DO   amLODIPine (NORVASC) 10 MG tablet Take 1 tablet by mouth daily 11/27/21   GLEN Rao MD   lisinopril (PRINIVIL;ZESTRIL) 5 MG tablet Take 1 tablet by mouth daily 5/7/20   Lucila Cabrera MD   Insulin Detemir (LEVEMIR SC) Inject 40 Units into the skin In the morning    Historical Provider, MD   Insulin Detemir (LEVEMIR FLEXPEN SC) Inject 10 Units into the skin nightly    Historical Provider, MD   docusate sodium (COLACE, DULCOLAX) 100 MG CAPS Take 100 mg by mouth 2 times daily as needed for Constipation. 12/14/14   Mitch Hsu MD   glimepiride (AMARYL) 4 MG tablet Take 4 mg by mouth every morning (before breakfast). Historical Provider, MD       Allergies:  No Known Allergies. Social History:    TOBACCO:  No  ETOH:  No    Family History: reviewed and positives included in HPI- all other pertinent GI family history negative      REVIEW OF SYSTEMS: see HPI for positives and pertinent negatives.  All other systems reviewed and are negative    PHYSICAL EXAM:    Vitals:  /61   Pulse 77   Temp 98.6 °F (37 °C) (Oral)   Resp 20   Ht 6' 0.01\" (1.829 m)   Wt 201 lb (91.2 kg)   SpO2 98%   BMI 27.25 kg/m²   CONSTITUTIONAL: alert, cooperative, no apparent distress,   EYES:  pupils equal, round and reactive to light and sclera clear  ENT:  normocepalic, without obvious abnormality  NECK:  supple, symmetrical, trachea midline  HEMATOLOGIC/LYMPHATICS:  no cervical lymphadenopathy and no supraclavicular lymphadenopathy  LUNGS:  clear to auscultation  CARDIOVASCULAR:  regular rate and rhythm and no murmur noted  ABDOMEN:  SP cath in place-- abd soft, non-tender with normal bowel sounds. No organomegaly or masses  NEUROLOGIC: no focal deficit detected  SKIN:  no lesions  EXTREMITIES: no clubbing, cyanosis, or edema  RECTAL: formed brown stool-- specimen obtained and sent for occult blood    IMPRESSION:  1) no evidence of overt GI bleed--- stool specimen obtained and sent for hemoccult       RECOMMENDATIONS:  1) will follow          Temi Hawthorne M.D

## 2022-03-29 NOTE — CARE COORDINATION
Call to [facility]. Pt was expected at ARU yesterday. LSW informed that LSW will notify today/s doctor. PS sent to the doctor that Pt can discharge to ARU when medically stable.

## 2022-03-29 NOTE — PROGRESS NOTES
Cardiology Note    Today's Plan: continue current medical management. Admit Date:  3/16/2022    Admission diagnosis / Complaint: ? Tachycardia      Subjective:  Mr. Jayant Rankin states that he is fin. He does not answer questions in detail today. Assessment and Plan:  1. SVT: no additional elevated HR since coreg started. Continue Coreg 3.125 mg twice daily. 2. Hypertension: Controlled on norvasc, hydralazine, Coreg. 3. Will sign off. Please re consult if additional cardiology recommendations are needed. Echo 7/2021 @ [facility]:   1. Left ventricle: The cavity size was normal. There was mild      concentric hypertrophy. Systolic function was normal. The      estimated ejection fraction was in the range of 55% to 60%. Left      ventricular diastolic function parameters were normal for the      patient's age. 2. Right ventricle: The cavity size was normal. Wall thickness was      normal. Systolic function was mildly reduced. 3. Atrial septum: Agitated saline study showed no shunt, at      baseline or with provocation. 4. Pulmonary arteries: Systolic pressure was within the normal      range. Estimated PA peak pressure is 26mm Hg (S). 5. Pericardium, extracardiac: There was no pericardial effusion. Objective:   /61   Pulse 77   Temp 98.6 °F (37 °C) (Oral)   Resp 20   Ht 6' 0.01\" (1.829 m)   Wt 201 lb (91.2 kg)   SpO2 98%   BMI 27.25 kg/m²       Intake/Output Summary (Last 24 hours) at 3/29/2022 0747  Last data filed at 3/28/2022 1855  Gross per 24 hour   Intake 5 ml   Output 800 ml   Net -795 ml       TELEMETRY: Sinus      has a past medical history of Acute urinary tract infection, Ataxia, Diabetes mellitus ([facility] Fusion of spine of cervical region, Gait disturbance, History of prostate cancer, History of tobacco use, Hyperlipidemia, and Osteoarthritis. has a past surgical history that includes Prostate surgery; other surgical history (3/28/13);  Colon surgery; Bladder surgery; Prostatectomy (1996); and Bladder surgery (Left, 3/17/2022). Physical Exam  Vitals reviewed. Constitutional:       General: He is not in acute distress. Appearance: Normal appearance. He is not ill-appearing. HENT:      Head: Atraumatic. Neck:      Vascular: No carotid bruit. Cardiovascular:      Rate and Rhythm: Normal rate and regular rhythm. Pulses: Normal pulses. Heart sounds: Normal heart sounds. No murmur heard. Pulmonary:      Effort: Pulmonary effort is normal. No respiratory distress. Breath sounds: Normal breath sounds. Musculoskeletal:         General: No deformity. Cervical back: Neck supple. No muscular tenderness. Right lower leg: Edema present. Left lower leg: Edema present. Neurological:      Mental Status: He is alert.           Medications:    glipiZIDE  15 mg Oral BID AC    doxycycline hyclate  100 mg Oral 2 times per day    alogliptin  6.25 mg Oral Daily    insulin glargine  20 Units SubCUTAneous Nightly    insulin lispro  0-12 Units SubCUTAneous Nightly    carvedilol  3.125 mg Oral BID WC    hydrALAZINE  25 mg Oral 3 times per day    levothyroxine  50 mcg Oral Daily    insulin lispro  0-12 Units SubCUTAneous TID WC    mirtazapine  15 mg Oral Nightly    vancomycin (VANCOCIN) intermittent dosing (placeholder)   Other RX Placeholder    aspirin  81 mg Oral Daily    cephALEXin  500 mg Oral 4 times per day    atorvastatin  40 mg Oral Nightly    polyethylene glycol  17 g Oral Daily    sodium chloride flush  5-40 mL IntraVENous 2 times per day    heparin (porcine)  5,000 Units SubCUTAneous 3 times per day    allopurinol  100 mg Oral Daily    pregabalin  50 mg Oral BID      dextrose      sodium chloride 25 mL (03/24/22 7580)     oxyCODONE, glucose, glucagon (rDNA), dextrose, dextrose bolus (hypoglycemia) **OR** dextrose bolus (hypoglycemia), HYDROmorphone, sodium chloride flush, sodium chloride, ondansetron **OR** ondansetron, polyethylene glycol, acetaminophen **OR** acetaminophen    Lab Data:  CBC:   Recent Labs     22  0822 22  0020   WBC 9.6 9.4   HGB 8.3* 8.1*   HCT 26.1* 26.6*   MCV 93.5 94.7   * 337     BMP:   Recent Labs     22  0822 22  0020    135  135   K 4.6 4.9  4.9   CL 99 100  100   CO2   22   PHOS  --  5.1*   BUN 45* 51*  49*   CREATININE 2.0* 2.1*  2.1*       Janell CACERES Tejinder, APRN - CNP,  3/29/2022 7:47 AM  I have seen ,spoken to  and examined this patient personally, independently of the NENA. I have reviewed the hospital care given to date and reviewed all pertinent labs and imaging. I have spoken with patient, nursing staff and provided written and verbal instructions . The above note has been reviewed     CARDIOLOGY ATTENDING ADDENDUM    HPI:  I have reviewed the above HPI  And agree with above     Pulse Range: Pulse  Av  Min: 77  Max: 84    Blood Presuure Range:  Systolic (95IDO), VFY:552 , Min:111 , JTU:783   ; Diastolic (65ENR), ZNU:68, Min:59, Max:66      Pulse ox Range: SpO2  Av.3 %  Min: 98 %  Max: 100 %    24hr I & O:    Intake/Output Summary (Last 24 hours) at 3/29/2022 1220  Last data filed at 3/28/2022 1855  Gross per 24 hour   Intake --   Output 800 ml   Net -800 ml         /61   Pulse 77   Temp 98.6 °F (37 °C) (Oral)   Resp 20   Ht 6' 0.01\" (1.829 m)   Wt 201 lb (91.2 kg)   SpO2 98%   BMI 27.25 kg/m²       Physical Exam:  General:  Awake, alert, NAD  Head:normal  Eye:normal  Neck:  No JVD   Chest:  Clear to auscultation, respiration easy  Cardiovascular:  RRR S1S2  Abdomen:   nontender  Extremities:  tr edema  Pulses; palpable  Neuro: grossly normal      MEDICAL DECISION MAKING;    Pt assessed , chart reviewed, patient examined examined , all available data was reviewed, following is the plan which was discussed with NENA as well:    -Tachycardia that is SVT patient is on beta-blocker that is Coreg which will be continued, no further episodes of SVT noted     -Hypertension patient is on Toprol which is monitoring his blood pressure which will be continued     -Hypothyroidism patient is on replacement therapy with Synthroid 50 mcg p.o. daily     -Hyperlipidemia patient is on statin Lipitor check lipid profile     -Diabetes on insulin and alogliptin which is being monitored     -Patient has Klebsiella bacteremia and is on antibiotic per the primary team          Shyanne Duke MD [facility]

## 2022-03-30 ENCOUNTER — HOSPITAL ENCOUNTER (INPATIENT)
Age: 82
LOS: 15 days | Discharge: NURSING, REHAB OR LONG-TERM CARE | DRG: 947 | End: 2022-04-14
Attending: PHYSICAL MEDICINE & REHABILITATION | Admitting: PHYSICAL MEDICINE & REHABILITATION
Payer: MEDICARE

## 2022-03-30 VITALS
SYSTOLIC BLOOD PRESSURE: 133 MMHG | HEIGHT: 72 IN | RESPIRATION RATE: 14 BRPM | BODY MASS INDEX: 27.22 KG/M2 | HEART RATE: 86 BPM | OXYGEN SATURATION: 100 % | WEIGHT: 201 LBS | TEMPERATURE: 98.1 F | DIASTOLIC BLOOD PRESSURE: 73 MMHG

## 2022-03-30 PROBLEM — G93.41 METABOLIC ENCEPHALOPATHY: Status: ACTIVE | Noted: 2022-03-30

## 2022-03-30 LAB
ALBUMIN SERPL-MCNC: 3.6 GM/DL (ref 3.4–5)
ALP BLD-CCNC: 118 IU/L (ref 40–128)
ALT SERPL-CCNC: 13 U/L (ref 10–40)
ANION GAP SERPL CALCULATED.3IONS-SCNC: 15 MMOL/L (ref 4–16)
ANTITHYROGLOBULIN AB: 0.9 IU/ML (ref 0–4)
AST SERPL-CCNC: 14 IU/L (ref 15–37)
BASOPHILS ABSOLUTE: 0.1 K/CU MM
BASOPHILS RELATIVE PERCENT: 1.1 % (ref 0–1)
BILIRUB SERPL-MCNC: 0.2 MG/DL (ref 0–1)
BUN BLDV-MCNC: 54 MG/DL (ref 6–23)
CALCIUM SERPL-MCNC: 9 MG/DL (ref 8.3–10.6)
CHLORIDE BLD-SCNC: 99 MMOL/L (ref 99–110)
CO2: 20 MMOL/L (ref 21–32)
CREAT SERPL-MCNC: 2.2 MG/DL (ref 0.9–1.3)
CULTURE: NORMAL
CULTURE: NORMAL
DIFFERENTIAL TYPE: ABNORMAL
EOSINOPHILS ABSOLUTE: 0.7 K/CU MM
EOSINOPHILS RELATIVE PERCENT: 6.8 % (ref 0–3)
GFR AFRICAN AMERICAN: 35 ML/MIN/1.73M2
GFR NON-AFRICAN AMERICAN: 29 ML/MIN/1.73M2
GLUCOSE BLD-MCNC: 183 MG/DL (ref 70–99)
GLUCOSE BLD-MCNC: 208 MG/DL (ref 70–99)
GLUCOSE BLD-MCNC: 270 MG/DL (ref 70–99)
GLUCOSE BLD-MCNC: 294 MG/DL (ref 70–99)
GLUCOSE BLD-MCNC: 321 MG/DL (ref 70–99)
GLUCOSE BLD-MCNC: 357 MG/DL (ref 70–99)
GLUCOSE BLD-MCNC: 83 MG/DL (ref 70–99)
HCT VFR BLD CALC: 24.7 % (ref 42–52)
HCT VFR BLD CALC: 29.1 % (ref 42–52)
HEMOGLOBIN: 7.9 GM/DL (ref 13.5–18)
HEMOGLOBIN: 9 GM/DL (ref 13.5–18)
IMMATURE NEUTROPHIL %: 0.6 % (ref 0–0.43)
LYMPHOCYTES ABSOLUTE: 2.1 K/CU MM
LYMPHOCYTES RELATIVE PERCENT: 20.1 % (ref 24–44)
Lab: NORMAL
Lab: NORMAL
MCH RBC QN AUTO: 29.4 PG (ref 27–31)
MCHC RBC AUTO-ENTMCNC: 30.9 % (ref 32–36)
MCV RBC AUTO: 95.1 FL (ref 78–100)
MONOCYTES ABSOLUTE: 0.6 K/CU MM
MONOCYTES RELATIVE PERCENT: 6.1 % (ref 0–4)
NUCLEATED RBC %: 0 %
PDW BLD-RTO: 15.7 % (ref 11.7–14.9)
PLATELET # BLD: 504 K/CU MM (ref 140–440)
PMV BLD AUTO: 10.5 FL (ref 7.5–11.1)
POTASSIUM SERPL-SCNC: 5.3 MMOL/L (ref 3.5–5.1)
RBC # BLD: 3.06 M/CU MM (ref 4.6–6.2)
SARS-COV-2, NAAT: NOT DETECTED
SEGMENTED NEUTROPHILS ABSOLUTE COUNT: 6.8 K/CU MM
SEGMENTED NEUTROPHILS RELATIVE PERCENT: 65.3 % (ref 36–66)
SODIUM BLD-SCNC: 134 MMOL/L (ref 135–145)
SOURCE: NORMAL
SPECIMEN: NORMAL
SPECIMEN: NORMAL
TOTAL IMMATURE NEUTOROPHIL: 0.06 K/CU MM
TOTAL NUCLEATED RBC: 0 K/CU MM
TOTAL PROTEIN: 6.4 GM/DL (ref 6.4–8.2)
WBC # BLD: 10.4 K/CU MM (ref 4–10.5)

## 2022-03-30 PROCEDURE — 6370000000 HC RX 637 (ALT 250 FOR IP): Performed by: INTERNAL MEDICINE

## 2022-03-30 PROCEDURE — 36415 COLL VENOUS BLD VENIPUNCTURE: CPT

## 2022-03-30 PROCEDURE — 80053 COMPREHEN METABOLIC PANEL: CPT

## 2022-03-30 PROCEDURE — 87635 SARS-COV-2 COVID-19 AMP PRB: CPT

## 2022-03-30 PROCEDURE — 6370000000 HC RX 637 (ALT 250 FOR IP): Performed by: NURSE PRACTITIONER

## 2022-03-30 PROCEDURE — 94761 N-INVAS EAR/PLS OXIMETRY MLT: CPT

## 2022-03-30 PROCEDURE — 82962 GLUCOSE BLOOD TEST: CPT

## 2022-03-30 PROCEDURE — 51798 US URINE CAPACITY MEASURE: CPT

## 2022-03-30 PROCEDURE — 6360000002 HC RX W HCPCS: Performed by: INTERNAL MEDICINE

## 2022-03-30 PROCEDURE — 97110 THERAPEUTIC EXERCISES: CPT

## 2022-03-30 PROCEDURE — 1280000000 HC REHAB R&B

## 2022-03-30 PROCEDURE — 6370000000 HC RX 637 (ALT 250 FOR IP): Performed by: UROLOGY

## 2022-03-30 PROCEDURE — 6360000002 HC RX W HCPCS: Performed by: UROLOGY

## 2022-03-30 PROCEDURE — 85025 COMPLETE CBC W/AUTO DIFF WBC: CPT

## 2022-03-30 PROCEDURE — 97530 THERAPEUTIC ACTIVITIES: CPT

## 2022-03-30 PROCEDURE — 85014 HEMATOCRIT: CPT

## 2022-03-30 PROCEDURE — 6370000000 HC RX 637 (ALT 250 FOR IP): Performed by: HOSPITALIST

## 2022-03-30 PROCEDURE — 85018 HEMOGLOBIN: CPT

## 2022-03-30 PROCEDURE — 2580000003 HC RX 258: Performed by: UROLOGY

## 2022-03-30 PROCEDURE — 99223 1ST HOSP IP/OBS HIGH 75: CPT | Performed by: PHYSICAL MEDICINE & REHABILITATION

## 2022-03-30 PROCEDURE — 97112 NEUROMUSCULAR REEDUCATION: CPT

## 2022-03-30 RX ORDER — ACETAMINOPHEN 650 MG/1
650 SUPPOSITORY RECTAL EVERY 6 HOURS PRN
Status: DISCONTINUED | OUTPATIENT
Start: 2022-03-30 | End: 2022-03-30

## 2022-03-30 RX ORDER — ACETAMINOPHEN 650 MG/1
650 SUPPOSITORY RECTAL EVERY 6 HOURS PRN
Status: CANCELLED | OUTPATIENT
Start: 2022-03-30

## 2022-03-30 RX ORDER — POLYETHYLENE GLYCOL 3350 17 G/17G
17 POWDER, FOR SOLUTION ORAL DAILY PRN
Status: CANCELLED | OUTPATIENT
Start: 2022-03-30

## 2022-03-30 RX ORDER — INSULIN GLARGINE 100 [IU]/ML
20 INJECTION, SOLUTION SUBCUTANEOUS NIGHTLY
Status: CANCELLED | OUTPATIENT
Start: 2022-03-30

## 2022-03-30 RX ORDER — ACETAMINOPHEN 325 MG/1
650 TABLET ORAL EVERY 4 HOURS PRN
Status: DISCONTINUED | OUTPATIENT
Start: 2022-03-30 | End: 2022-04-14 | Stop reason: HOSPADM

## 2022-03-30 RX ORDER — SODIUM CHLORIDE 0.9 % (FLUSH) 0.9 %
5-40 SYRINGE (ML) INJECTION EVERY 12 HOURS SCHEDULED
Status: CANCELLED | OUTPATIENT
Start: 2022-03-30

## 2022-03-30 RX ORDER — ACETAMINOPHEN 325 MG/1
650 TABLET ORAL EVERY 6 HOURS PRN
Status: CANCELLED | OUTPATIENT
Start: 2022-03-30

## 2022-03-30 RX ORDER — POLYETHYLENE GLYCOL 3350 17 G/17G
17 POWDER, FOR SOLUTION ORAL DAILY PRN
Status: DISCONTINUED | OUTPATIENT
Start: 2022-03-30 | End: 2022-03-30

## 2022-03-30 RX ORDER — ALLOPURINOL 100 MG/1
100 TABLET ORAL DAILY
Status: DISCONTINUED | OUTPATIENT
Start: 2022-03-31 | End: 2022-04-14 | Stop reason: HOSPADM

## 2022-03-30 RX ORDER — INSULIN GLARGINE 100 [IU]/ML
20 INJECTION, SOLUTION SUBCUTANEOUS NIGHTLY
Status: DISCONTINUED | OUTPATIENT
Start: 2022-03-30 | End: 2022-03-31

## 2022-03-30 RX ORDER — GLIPIZIDE 5 MG/1
15 TABLET ORAL
Status: DISCONTINUED | OUTPATIENT
Start: 2022-03-31 | End: 2022-04-01

## 2022-03-30 RX ORDER — ALOGLIPTIN 12.5 MG/1
6.25 TABLET, FILM COATED ORAL DAILY
Status: CANCELLED | OUTPATIENT
Start: 2022-03-30

## 2022-03-30 RX ORDER — CARVEDILOL 6.25 MG/1
3.12 TABLET ORAL 2 TIMES DAILY WITH MEALS
Status: DISCONTINUED | OUTPATIENT
Start: 2022-03-31 | End: 2022-04-14 | Stop reason: HOSPADM

## 2022-03-30 RX ORDER — PREGABALIN 50 MG/1
50 CAPSULE ORAL 2 TIMES DAILY
Status: CANCELLED | OUTPATIENT
Start: 2022-03-30

## 2022-03-30 RX ORDER — POLYETHYLENE GLYCOL 3350 17 G/17G
17 POWDER, FOR SOLUTION ORAL ONCE
Status: DISCONTINUED | OUTPATIENT
Start: 2022-03-30 | End: 2022-03-30 | Stop reason: HOSPADM

## 2022-03-30 RX ORDER — DOXYCYCLINE HYCLATE 100 MG
100 TABLET ORAL EVERY 12 HOURS SCHEDULED
Status: CANCELLED | OUTPATIENT
Start: 2022-03-30 | End: 2022-04-09

## 2022-03-30 RX ORDER — HEPARIN SODIUM 5000 [USP'U]/ML
5000 INJECTION, SOLUTION INTRAVENOUS; SUBCUTANEOUS EVERY 8 HOURS SCHEDULED
Status: DISCONTINUED | OUTPATIENT
Start: 2022-03-30 | End: 2022-04-14

## 2022-03-30 RX ORDER — PREGABALIN 50 MG/1
50 CAPSULE ORAL 2 TIMES DAILY
Status: DISCONTINUED | OUTPATIENT
Start: 2022-03-30 | End: 2022-04-14 | Stop reason: HOSPADM

## 2022-03-30 RX ORDER — ALOGLIPTIN 6.25 MG/1
6.25 TABLET, FILM COATED ORAL DAILY
Status: DISCONTINUED | OUTPATIENT
Start: 2022-03-31 | End: 2022-04-14 | Stop reason: HOSPADM

## 2022-03-30 RX ORDER — MIRTAZAPINE 15 MG/1
15 TABLET, ORALLY DISINTEGRATING ORAL NIGHTLY
Status: DISCONTINUED | OUTPATIENT
Start: 2022-03-30 | End: 2022-04-06

## 2022-03-30 RX ORDER — OXYCODONE HYDROCHLORIDE 5 MG/1
5 TABLET ORAL EVERY 4 HOURS PRN
Status: DISCONTINUED | OUTPATIENT
Start: 2022-03-30 | End: 2022-03-31

## 2022-03-30 RX ORDER — ATORVASTATIN CALCIUM 40 MG/1
40 TABLET, FILM COATED ORAL NIGHTLY
Status: CANCELLED | OUTPATIENT
Start: 2022-03-30

## 2022-03-30 RX ORDER — SODIUM CHLORIDE 0.9 % (FLUSH) 0.9 %
5-40 SYRINGE (ML) INJECTION PRN
Status: CANCELLED | OUTPATIENT
Start: 2022-03-30

## 2022-03-30 RX ORDER — BISACODYL 10 MG
10 SUPPOSITORY, RECTAL RECTAL DAILY PRN
Status: DISCONTINUED | OUTPATIENT
Start: 2022-03-30 | End: 2022-04-14 | Stop reason: HOSPADM

## 2022-03-30 RX ORDER — POLYETHYLENE GLYCOL 3350 17 G/17G
17 POWDER, FOR SOLUTION ORAL DAILY PRN
Status: DISCONTINUED | OUTPATIENT
Start: 2022-03-30 | End: 2022-04-14 | Stop reason: HOSPADM

## 2022-03-30 RX ORDER — LEVOTHYROXINE SODIUM 0.05 MG/1
50 TABLET ORAL DAILY
Status: CANCELLED | OUTPATIENT
Start: 2022-03-30

## 2022-03-30 RX ORDER — MIRTAZAPINE 15 MG/1
15 TABLET, ORALLY DISINTEGRATING ORAL NIGHTLY
Status: CANCELLED | OUTPATIENT
Start: 2022-03-30

## 2022-03-30 RX ORDER — ASPIRIN 81 MG/1
81 TABLET, CHEWABLE ORAL DAILY
Status: DISCONTINUED | OUTPATIENT
Start: 2022-03-31 | End: 2022-04-14 | Stop reason: HOSPADM

## 2022-03-30 RX ORDER — DOXYCYCLINE HYCLATE 100 MG
100 TABLET ORAL EVERY 12 HOURS SCHEDULED
Status: COMPLETED | OUTPATIENT
Start: 2022-03-30 | End: 2022-04-09

## 2022-03-30 RX ORDER — CHLORTHALIDONE 25 MG/1
25 TABLET ORAL DAILY
Status: DISCONTINUED | OUTPATIENT
Start: 2022-03-31 | End: 2022-04-04

## 2022-03-30 RX ORDER — CHLORTHALIDONE 25 MG/1
25 TABLET ORAL DAILY
Status: CANCELLED | OUTPATIENT
Start: 2022-03-31

## 2022-03-30 RX ORDER — ATORVASTATIN CALCIUM 40 MG/1
40 TABLET, FILM COATED ORAL NIGHTLY
Status: DISCONTINUED | OUTPATIENT
Start: 2022-03-30 | End: 2022-04-14 | Stop reason: HOSPADM

## 2022-03-30 RX ORDER — GLIPIZIDE 5 MG/1
15 TABLET ORAL
Status: CANCELLED | OUTPATIENT
Start: 2022-03-31

## 2022-03-30 RX ORDER — SODIUM CHLORIDE 0.9 % (FLUSH) 0.9 %
5-40 SYRINGE (ML) INJECTION EVERY 12 HOURS SCHEDULED
Status: DISCONTINUED | OUTPATIENT
Start: 2022-03-31 | End: 2022-04-12

## 2022-03-30 RX ORDER — CEPHALEXIN 500 MG/1
500 CAPSULE ORAL EVERY 6 HOURS SCHEDULED
Status: CANCELLED | OUTPATIENT
Start: 2022-03-30 | End: 2022-03-31

## 2022-03-30 RX ORDER — DEXTROSE MONOHYDRATE 50 MG/ML
100 INJECTION, SOLUTION INTRAVENOUS PRN
Status: DISCONTINUED | OUTPATIENT
Start: 2022-03-30 | End: 2022-04-14 | Stop reason: HOSPADM

## 2022-03-30 RX ORDER — SODIUM CHLORIDE 0.9 % (FLUSH) 0.9 %
5-40 SYRINGE (ML) INJECTION PRN
Status: DISCONTINUED | OUTPATIENT
Start: 2022-03-30 | End: 2022-04-14 | Stop reason: HOSPADM

## 2022-03-30 RX ORDER — CEPHALEXIN 500 MG/1
500 CAPSULE ORAL EVERY 6 HOURS SCHEDULED
Status: COMPLETED | OUTPATIENT
Start: 2022-03-31 | End: 2022-03-31

## 2022-03-30 RX ORDER — CHLORTHALIDONE 25 MG/1
25 TABLET ORAL DAILY
Status: DISCONTINUED | OUTPATIENT
Start: 2022-03-30 | End: 2022-03-30 | Stop reason: HOSPADM

## 2022-03-30 RX ORDER — DEXTROSE MONOHYDRATE 50 MG/ML
100 INJECTION, SOLUTION INTRAVENOUS PRN
Status: CANCELLED | OUTPATIENT
Start: 2022-03-30

## 2022-03-30 RX ORDER — ALLOPURINOL 100 MG/1
100 TABLET ORAL DAILY
Status: CANCELLED | OUTPATIENT
Start: 2022-03-30

## 2022-03-30 RX ORDER — ACETAMINOPHEN 325 MG/1
650 TABLET ORAL EVERY 6 HOURS PRN
Status: DISCONTINUED | OUTPATIENT
Start: 2022-03-30 | End: 2022-03-30 | Stop reason: SDUPTHER

## 2022-03-30 RX ORDER — CARVEDILOL 3.12 MG/1
3.12 TABLET ORAL 2 TIMES DAILY WITH MEALS
Status: CANCELLED | OUTPATIENT
Start: 2022-03-31

## 2022-03-30 RX ORDER — ASPIRIN 81 MG/1
81 TABLET, CHEWABLE ORAL DAILY
Status: CANCELLED | OUTPATIENT
Start: 2022-03-30

## 2022-03-30 RX ORDER — LEVOTHYROXINE SODIUM 0.05 MG/1
50 TABLET ORAL DAILY
Status: DISCONTINUED | OUTPATIENT
Start: 2022-03-31 | End: 2022-04-14 | Stop reason: HOSPADM

## 2022-03-30 RX ORDER — OXYCODONE HYDROCHLORIDE 5 MG/1
5 TABLET ORAL EVERY 4 HOURS PRN
Status: CANCELLED | OUTPATIENT
Start: 2022-03-30

## 2022-03-30 RX ORDER — HEPARIN SODIUM 5000 [USP'U]/ML
5000 INJECTION, SOLUTION INTRAVENOUS; SUBCUTANEOUS EVERY 8 HOURS SCHEDULED
Status: CANCELLED | OUTPATIENT
Start: 2022-03-30

## 2022-03-30 RX ADMIN — ASPIRIN 81 MG CHEWABLE TABLET 81 MG: 81 TABLET CHEWABLE at 09:36

## 2022-03-30 RX ADMIN — INSULIN GLARGINE 20 UNITS: 100 INJECTION, SOLUTION SUBCUTANEOUS at 23:26

## 2022-03-30 RX ADMIN — ALOGLIPTIN 6.25 MG: 12.5 TABLET, FILM COATED ORAL at 09:36

## 2022-03-30 RX ADMIN — CEPHALEXIN 500 MG: 500 CAPSULE ORAL at 17:32

## 2022-03-30 RX ADMIN — CEPHALEXIN 500 MG: 500 CAPSULE ORAL at 05:54

## 2022-03-30 RX ADMIN — ATORVASTATIN CALCIUM 40 MG: 40 TABLET, FILM COATED ORAL at 22:45

## 2022-03-30 RX ADMIN — PREGABALIN 50 MG: 50 CAPSULE ORAL at 22:45

## 2022-03-30 RX ADMIN — HEPARIN SODIUM 5000 UNITS: 5000 INJECTION INTRAVENOUS; SUBCUTANEOUS at 05:54

## 2022-03-30 RX ADMIN — CHLORTHALIDONE 25 MG: 25 TABLET ORAL at 12:07

## 2022-03-30 RX ADMIN — CEPHALEXIN 500 MG: 500 CAPSULE ORAL at 23:30

## 2022-03-30 RX ADMIN — GLIPIZIDE 15 MG: 5 TABLET ORAL at 09:37

## 2022-03-30 RX ADMIN — HEPARIN SODIUM 5000 UNITS: 5000 INJECTION INTRAVENOUS; SUBCUTANEOUS at 22:45

## 2022-03-30 RX ADMIN — SODIUM CHLORIDE, PRESERVATIVE FREE 10 ML: 5 INJECTION INTRAVENOUS at 09:37

## 2022-03-30 RX ADMIN — GLIPIZIDE 15 MG: 5 TABLET ORAL at 17:32

## 2022-03-30 RX ADMIN — CEPHALEXIN 500 MG: 500 CAPSULE ORAL at 12:07

## 2022-03-30 RX ADMIN — HYDROMORPHONE HYDROCHLORIDE 0.5 MG: 1 INJECTION, SOLUTION INTRAMUSCULAR; INTRAVENOUS; SUBCUTANEOUS at 05:22

## 2022-03-30 RX ADMIN — HEPARIN SODIUM 5000 UNITS: 5000 INJECTION INTRAVENOUS; SUBCUTANEOUS at 17:32

## 2022-03-30 RX ADMIN — PREGABALIN 50 MG: 50 CAPSULE ORAL at 09:37

## 2022-03-30 RX ADMIN — DOXYCYCLINE HYCLATE 100 MG: 100 TABLET, FILM COATED ORAL at 22:45

## 2022-03-30 RX ADMIN — ALLOPURINOL 100 MG: 100 TABLET ORAL at 09:36

## 2022-03-30 RX ADMIN — CARVEDILOL 3.12 MG: 3.12 TABLET, FILM COATED ORAL at 09:36

## 2022-03-30 RX ADMIN — HYDRALAZINE HYDROCHLORIDE 25 MG: 25 TABLET, FILM COATED ORAL at 05:54

## 2022-03-30 RX ADMIN — DOXYCYCLINE HYCLATE 100 MG: 100 TABLET, COATED ORAL at 09:37

## 2022-03-30 RX ADMIN — LEVOTHYROXINE SODIUM 50 MCG: 0.05 TABLET ORAL at 05:54

## 2022-03-30 RX ADMIN — CEPHALEXIN 500 MG: 500 CAPSULE ORAL at 00:57

## 2022-03-30 RX ADMIN — CARVEDILOL 3.12 MG: 3.12 TABLET, FILM COATED ORAL at 17:32

## 2022-03-30 RX ADMIN — MIRTAZAPINE 15 MG: 15 TABLET, ORALLY DISINTEGRATING ORAL at 22:45

## 2022-03-30 ASSESSMENT — PAIN SCALES - GENERAL
PAINLEVEL_OUTOF10: 0
PAINLEVEL_OUTOF10: 10
PAINLEVEL_OUTOF10: 10
PAINLEVEL_OUTOF10: 0

## 2022-03-30 ASSESSMENT — PAIN DESCRIPTION - LOCATION: LOCATION: ANKLE

## 2022-03-30 ASSESSMENT — PAIN DESCRIPTION - ORIENTATION: ORIENTATION: LEFT

## 2022-03-30 ASSESSMENT — PAIN DESCRIPTION - PAIN TYPE: TYPE: ACUTE PAIN

## 2022-03-30 NOTE — DISCHARGE SUMMARY
Discharge Summary  Angela Thompson MD     Name:  oDm Wilburn /Age/Sex: 1940  (78 y.o. male)   MRN & CSN:  [id number] & [id number] Admission Date/Time: 3/16/2022 11:35 PM   Attending:  Angela Thompson MD Discharging Physician: Angela Thompson MD     Hospital Course:     10-year-old male initially admitted for UTI with sepsis, and subsequently developed an SVT, also found to have PAMELLA on baseline CKD stage IV and most recent (in last 24 hours) has had some improvement in his presentation, sitting up in bed, alert and oriented to self and place but not to time. Culture results noted. Patient complains of back pain but denies any fever or chills. Patient was found to have markedly elevated BUN compared to creatinine and was started on IV fluids. However IV fluids have been discontinued by nephrologist and with the probable diagnosis of GI bleeding. Patient was asymptomatic when seen and examined and vital signs are stable and H&H remained stable. Of note patient has been approved for ARU      1. Sepsis secondary to UTI. Blood cultures on 3/17 growing Klebsiella pneumonia. Managed with 6 days of meropenem and 7 days of cephalexin. Repeat urine cultures negative. I will complete total of 14 days of antibiotics. 2. SVT (supraventricular tachycardia). Patient being maintained on low-dose metoprolol with heart rate well controlled. Patient being followed closely with further recommendations and management by cardiology consult. 3. Possible GI Bleed. Patient's H&H remained stable; however with a marked elevation of BUN as against creatinine, there is the likely indication of occult GI bleed as per nephrology consult input. Hemoglobin remained stable rather improving with no signs of overt bleeding. Stool guaiac negative GI cleared patient for discharge. 4. Acute kidney failure (HCC) on baseline Chronic kidney disease (CKD), stage IV (severe) ([facility] [address]  Patient is being closely monitored and managed by nephrology consult with further recommendations. Nephrology cleared patient for discharge and recommended to discharge patient on chlorthalidone 25 mg daily repeat CMP in 1 week and follow-up with nephrology in 2 weeks     5. 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. Continue with patient's Lipitor dose of 40 mg nightly. 6. Coagulase negative Staphylococcus bacteremia. Final culture results showing Staphylococcus Capitis, sensitive to doxycycline and therefore has been added to his regimen. Discontinue vancomycin. Will complete total of 14 days due to above     7. Acute metabolic encephalopathy. Markedly improved with patient alert and oriented to self and place but not to time, giving meaningful history and following commands. Will continue with current management and patient will benefit from placement in the [facility] for rehabilitation and recuperation. Now oriented x3     Heparin for DVT prophylaxis    The patient expressed appropriate understanding of and agreement with the discharge recommendations, medications, and plan. Consults this admission:  IP CONSULT TO HOSPITALIST  IP CONSULT TO UROLOGY  IP CONSULT TO ENDOCRINOLOGY  IP CONSULT TO DIETITIAN  IP CONSULT TO IV TEAM  IP CONSULT TO CARDIOLOGY  IP CONSULT TO GI    Discharge Instruction:     Follow up appointments:     [address] [address] Salvatore Simms [address] [place], [place] [id number] [phone]    In 1 week        Primary care physician:  within 2 weeks    Diet:  regular diet     Activity: activity as tolerated    Disposition: Discharged to:   []Home, [][facility], []SNF, [x]Acute Rehab, []Hospice     Condition on discharge: Stable    Discharge Medications:        Medication List      STOP taking these medications    hydrALAZINE 25 MG tablet  Commonly known as: APRESOLINE     labetalol 200 MG tablet  Commonly known as: Meera Mccormick your doctor about these medications    acetaminophen 325 MG tablet  Commonly known as: Tylenol  Take 2 tablets by mouth every 6 hours as needed for Pain     albuterol sulfate  (90 Base) MCG/ACT inhaler  Commonly known as: Proventil HFA  Inhale 2 puffs into the lungs every 4 hours as needed for Wheezing or Shortness of Breath With spacer (and mask if indicated). Thanks. allopurinol 100 MG tablet  Commonly known as: ZYLOPRIM     amLODIPine 10 MG tablet  Commonly known as: NORVASC  Take 1 tablet by mouth daily     atorvastatin 80 MG tablet  Commonly known as: LIPITOR     docusate 100 MG Caps  Commonly known as: COLACE, DULCOLAX  Take 100 mg by mouth 2 times daily as needed for Constipation. glimepiride 4 MG tablet  Commonly known as: AMARYL     * LEVEMIR SC     * LEVEMIR FLEXPEN SC     lisinopril 5 MG tablet  Commonly known as: PRINIVIL;ZESTRIL  Take 1 tablet by mouth daily     pregabalin 50 MG capsule  Commonly known as: LYRICA         * This list has 2 medication(s) that are the same as other medications prescribed for you. Read the directions carefully, and ask your doctor or other care provider to review them with you. Objective Findings at Discharge:     /68   Pulse 78   Temp 98 °F (36.7 °C) (Oral)   Resp 13   Ht 6' 0.01\" (1.829 m)   Wt 201 lb (91.2 kg)   SpO2 100%   BMI 27.25 kg/m²            PHYSICAL EXAM     GEN:  Awake male, sitting upright in bed in no apparent distress. RESP:  CTAB, no wheezes, rales or rhonchi. CVS:  RRR. No peripheral edema. GI/:  S/NT/ND BS+   NEURO: No focal defecits. PSYCH:  Awake, alert, oriented x 3. Affect appropriate. LABS: Reviewd    IMAGING: Reviwed    32 Minutes spent in preparation of discharging this patient including face to face encounter, discussions with the patient/family, medication reconciliation, and transition of care preparation.      Electronically signed by Edu Bernabe MD on 3/30/2022 at 11:39 AM

## 2022-03-30 NOTE — PROGRESS NOTES
Nephrology Progress Note  3/30/2022 8:40 AM        Subjective:   Admit Date: 3/16/2022  PCP: Juan Alberto Zarco MD    Interval History: Undergoing physical therapy this morning    Diet: Reasonable    ROS: Alert, awake, oriented and no confusion  No shortness of breath  Urine output recorded 1.6 L for the last 24 hours  No fever    Data:     Current meds:    glipiZIDE  15 mg Oral BID AC    doxycycline hyclate  100 mg Oral 2 times per day    alogliptin  6.25 mg Oral Daily    insulin glargine  20 Units SubCUTAneous Nightly    insulin lispro  0-12 Units SubCUTAneous Nightly    carvedilol  3.125 mg Oral BID WC    hydrALAZINE  25 mg Oral 3 times per day    levothyroxine  50 mcg Oral Daily    insulin lispro  0-12 Units SubCUTAneous TID WC    mirtazapine  15 mg Oral Nightly    aspirin  81 mg Oral Daily    cephALEXin  500 mg Oral 4 times per day    atorvastatin  40 mg Oral Nightly    polyethylene glycol  17 g Oral Daily    sodium chloride flush  5-40 mL IntraVENous 2 times per day    heparin (porcine)  5,000 Units SubCUTAneous 3 times per day    allopurinol  100 mg Oral Daily    pregabalin  50 mg Oral BID      dextrose      sodium chloride 25 mL (03/29/22 1531)         I/O last 3 completed shifts:  In: -   Out: 1600 [Urine:1600]    CBC:   Recent Labs     03/28/22  0020 03/29/22  0726 03/30/22  0153   WBC 9.4 11.2*  --    HGB 8.1* 7.9* 7.9*    488*  --           Recent Labs     03/28/22  0020 03/29/22  0726     135 137   K 4.9  4.9 5.0     100 103   CO2 22  22 21   BUN 51*  49* 53*   CREATININE 2.1*  2.1* 2.3*   GLUCOSE 261*  259* 188*       Lab Results   Component Value Date    CALCIUM 8.8 03/29/2022    PHOS 5.0 (H) 03/29/2022       Objective:     Vitals: BP (!) 147/66   Pulse 84   Temp 98.4 °F (36.9 °C) (Oral)   Resp 16   Ht 6' 0.01\" (1.829 m)   Wt 201 lb (91.2 kg)   SpO2 100%   BMI 27.25 kg/m²     General appearance: Alert, awake, no acute distress-doing reasonably well with physical therapy  HEENT: At least 2+ conjunctival pallor  Neck: Supple  Lungs: Coarse breath sound but no crackles  Heart: Seems regular rate and rhythm this morning  Abdomen: Soft, protuberant abdomen, nontender  Extremities: 2+ leg edema      Problem List :         Impression :     1. Acute kidney injury with underlying CKD stage IV A3-acceptable urine output-UA had no active sediment but have 1.1 g/day of proteinuria, it is a Hopper specimen of course-  2. Disproportionately high BUN-occult bleeding was suspected  3. Chronic ureteral obstruction, as well as bladder dysfunction requiring stent and Hopper catheter  4. Sepsis with gram-negative colton-chronic condition which include but not limited to, hypertension, dysrhythmia and diabetes    Recommendation/Plan  :     1. Labs are not available yet  2. No overt evidence of bleeding  3. May discharge to ARU from my standpoint  4. I can follow him there  5.  Follow clinically and biochemically      Jake Thomas MD MD

## 2022-03-30 NOTE — PROGRESS NOTES
Physical Therapy  Name: Radha Garcia MRN: [id number] :   1940   Date:  3/30/2022   Admission Date: 3/16/2022 Room:  [address] [place] [address]   Restrictions/Precautions:  Restrictions/Precautions  Restrictions/Precautions: General Precautions,Fall Risk       Communication with other providers: Handoff to [employer] RN, patient states he received pain medication before session from RN. Physician arrives during session for quick assessment. Physician asks patient if stool has been black. This therapist reports to physician that last evening's BM was brown but soft/loose. Subjective:  Patient states:  He is agreeable for therapy but attempts to deny OOB activity d/t RLE pain and low energy. Education on ARU frequency requirements and patient reflects understanding. Pain:   Location, Type, Intensity (0/10 to 10/10):  8/10 with movement  Objective:    Observation:  Patient in semi fowlers. BP is 148/68 at rest  Treatment, including education/measures:  Supine Exercises: Ankle pumps x 15  Quad sets x 15  Heel slides x 15    Therapeutic Exercise:  Therapeutic exercises were instructed today. Cues were given for technique, safety, recruitment, and rationale. Cues were verbal and/or tactile. Transfers with line management of IV, Tele Monitor, Pulse Ox, Suprapubic catheter, NC O2  Rolling: Mod A x1 ea direction  Supine to sit :Min-Mod A x1  Scooting :Min A retro into recliner  Sit to stand :Min-Mod A x1 from EOB  Standing balance: Fair- at Min-Mod A, required BUE support into RW, cues for forward weight shift  Stand to sit :Min A for safe guidance to transfer surface. Patient sits before BLE are up against recliner. Education on safety with transfers, patient reflects understanding  SPT:Mod A x1 EOB<>Recliner x1sets    Safety  Patient left safely in the recliner, with call light/phone in reach with alarm applied. Gait belt and mask were used for transfers and gait.   Assessment / Impression:     Patient's tolerance of treatment:  Good   Adverse Reaction: none  Significant change in status and impact:  none  Barriers to improvement:  patient  Plan for Next Session:    Will cont to work towards pt's goals per patient tolerance  Time in:  [phone]  Time out:  0757  Timed treatment minutes:  26  Total treatment time:  26    Previously filed items:  Social/Functional History  Lives With: Son  Type of Home: House  Home Layout: One level  Home Access: Stairs to enter with rails  Entrance Stairs - Number of Steps: 2  Bathroom Shower/Tub: Tub/Shower unit  Bathroom Toilet: Standard  Bathroom Equipment: Grab bars in [facility] chair  Home Equipment: [address]er  ADL Assistance: [address]: Independent (mod I with 4ww)  Transfer Assistance: Independent     Long term goals  Time Frame for Long term goals : In one week:  Long term goal 1: Pt will complete all bed mobility with minAx1  Long term goal 2: Pt will complete sit <> stand transfers with modAx2  Long term goal 3: Pt will complete bed <> chair transfers with modAx2  Long term goal 4: Pt will ambulate 10 feet with modAx2 with LRAD  Long term goal 5: Pt will independently complete 3 sets of 10 reps of BLE AROM exercises in available and allowed ROM    Electronically signed by:     Sivan Hough PTA  3/30/2022, 8:04 AM

## 2022-03-30 NOTE — PROGRESS NOTES
Occupational Therapy Treatment Note      Name: Dom Wilburn MRN: [id number] :   1940   Date:  3/30/2022   Admission Date: 3/16/2022 Room:  [address] [place] [address]       Restrictions/Precautions:  Restrictions/Precautions  Restrictions/Precautions: General Precautions,Fall Risk     Communication with other providers: Per chart review and Nurse patient is appropriate for therapeutic intervention. Subjective:  Patient states:  Agreeable to OT therapy. \"I already went to the bathroom. I had a hard time getting back into recliner after using the toilet. I'm little tired. \"  Pain: Denied (location, type, intensity)    Objective:    Observation:  In recliner upon arrival.  Objective Measures:  Alert and oriented    Treatment, including education:  Neuro-Muscular re-education:  Cues were given for position, posture, kinesthetic sense, safety, recruitment, and rationale. Cues were verbal and/or tactile. Engaged static standing balance training by reaching out TYSHAWN and PNF patterns utilizing UE support at all times with RW. Demo F- balance with retropulsion. Tolerated 1-2 mins with 4 trials. Therapeutic Activity Training:   Therapeutic activity training was instructed today. Cues were given for safety, sequence, UE/LE placement, awareness, and balance. Dyn Sitting balance: reaching out TYSHAWN in multidimensional direction F  Sit/stand transfers:Min/Mod due to increase repetitive reps from recliner>RW x8 reps     Functional Mobility: 5' with Min A utilizing RW    Activities performed today included bed mobility training, transfers, functional mobility  to increase strength, activity tolerance to facilitate IND c ADL tasks, func transfers / mobility with G safety awareness carryover    Therapeutic Exercise:  BUE strengthening ex targeting utilizing RED [employer]ERABAND shoulder press, chest press, shoulder abd/add, shoulder horiz abd/add, bicep/triceps curls x10 reps.  Demo SBA  +verbal/visual cues for pace and corrected techs.      Assessment / Impression:  Notable increase of fatigue with F+ carryover with given instructions  Patient's tolerance of treatment: good  Adverse Reaction: none  Significant change in status and impact:  none  Barriers to improvement: activity tolerance    Safety  Patient educated on role of OT , benefits of OT and rationale for therapeutic intervention. Patient safely in recliner + alarm set at end of session, with call light/phone in reach, and nursing aware. Gait belt was used for func transfers / mobility.     Plan for Next Session:    Continue OT POC    Time in:  1105  Time out:  1144   Timed treatment minutes:  39  Total treatment time:  39      Electronically signed by:    KHARI Cam,   3/30/2022, 11:09 AM

## 2022-03-31 LAB
GLUCOSE BLD-MCNC: 137 MG/DL (ref 70–99)
GLUCOSE BLD-MCNC: 141 MG/DL (ref 70–99)
GLUCOSE BLD-MCNC: 179 MG/DL (ref 70–99)
GLUCOSE BLD-MCNC: 199 MG/DL (ref 70–99)
GLUCOSE BLD-MCNC: 235 MG/DL (ref 70–99)

## 2022-03-31 PROCEDURE — 85025 COMPLETE CBC W/AUTO DIFF WBC: CPT

## 2022-03-31 PROCEDURE — 6360000002 HC RX W HCPCS: Performed by: INTERNAL MEDICINE

## 2022-03-31 PROCEDURE — 6370000000 HC RX 637 (ALT 250 FOR IP): Performed by: PHYSICAL MEDICINE & REHABILITATION

## 2022-03-31 PROCEDURE — 82962 GLUCOSE BLOOD TEST: CPT

## 2022-03-31 PROCEDURE — 94761 N-INVAS EAR/PLS OXIMETRY MLT: CPT

## 2022-03-31 PROCEDURE — 99211 OFF/OP EST MAY X REQ PHY/QHP: CPT

## 2022-03-31 PROCEDURE — 2580000003 HC RX 258: Performed by: INTERNAL MEDICINE

## 2022-03-31 PROCEDURE — 6370000000 HC RX 637 (ALT 250 FOR IP): Performed by: INTERNAL MEDICINE

## 2022-03-31 PROCEDURE — 97167 OT EVAL HIGH COMPLEX 60 MIN: CPT

## 2022-03-31 PROCEDURE — 97530 THERAPEUTIC ACTIVITIES: CPT

## 2022-03-31 PROCEDURE — 1280000000 HC REHAB R&B

## 2022-03-31 PROCEDURE — 97535 SELF CARE MNGMENT TRAINING: CPT

## 2022-03-31 PROCEDURE — 97163 PT EVAL HIGH COMPLEX 45 MIN: CPT

## 2022-03-31 PROCEDURE — 94664 DEMO&/EVAL PT USE INHALER: CPT

## 2022-03-31 PROCEDURE — 94150 VITAL CAPACITY TEST: CPT

## 2022-03-31 PROCEDURE — 92523 SPEECH SOUND LANG COMPREHEN: CPT

## 2022-03-31 RX ORDER — INSULIN GLARGINE 100 [IU]/ML
25 INJECTION, SOLUTION SUBCUTANEOUS NIGHTLY
Status: DISCONTINUED | OUTPATIENT
Start: 2022-03-31 | End: 2022-04-01

## 2022-03-31 RX ORDER — TRAMADOL HYDROCHLORIDE 50 MG/1
50 TABLET ORAL EVERY 6 HOURS PRN
Status: DISCONTINUED | OUTPATIENT
Start: 2022-03-31 | End: 2022-04-05

## 2022-03-31 RX ADMIN — HEPARIN SODIUM 5000 UNITS: 5000 INJECTION INTRAVENOUS; SUBCUTANEOUS at 05:33

## 2022-03-31 RX ADMIN — GLIPIZIDE 15 MG: 5 TABLET ORAL at 08:30

## 2022-03-31 RX ADMIN — ACETAMINOPHEN 650 MG: 325 TABLET ORAL at 05:32

## 2022-03-31 RX ADMIN — ATORVASTATIN CALCIUM 40 MG: 40 TABLET, FILM COATED ORAL at 21:37

## 2022-03-31 RX ADMIN — ASPIRIN 81 MG 81 MG: 81 TABLET ORAL at 08:30

## 2022-03-31 RX ADMIN — TRAMADOL HYDROCHLORIDE 50 MG: 50 TABLET, COATED ORAL at 16:43

## 2022-03-31 RX ADMIN — CARVEDILOL 3.12 MG: 6.25 TABLET, FILM COATED ORAL at 08:30

## 2022-03-31 RX ADMIN — GLIPIZIDE 15 MG: 5 TABLET ORAL at 16:43

## 2022-03-31 RX ADMIN — INSULIN GLARGINE 25 UNITS: 100 INJECTION, SOLUTION SUBCUTANEOUS at 21:38

## 2022-03-31 RX ADMIN — ALLOPURINOL 100 MG: 100 TABLET ORAL at 08:30

## 2022-03-31 RX ADMIN — OXYCODONE HYDROCHLORIDE 5 MG: 5 TABLET ORAL at 08:30

## 2022-03-31 RX ADMIN — INSULIN LISPRO 2 UNITS: 100 INJECTION, SOLUTION INTRAVENOUS; SUBCUTANEOUS at 12:37

## 2022-03-31 RX ADMIN — HEPARIN SODIUM 5000 UNITS: 5000 INJECTION INTRAVENOUS; SUBCUTANEOUS at 14:39

## 2022-03-31 RX ADMIN — SODIUM CHLORIDE, PRESERVATIVE FREE 10 ML: 5 INJECTION INTRAVENOUS at 21:37

## 2022-03-31 RX ADMIN — CEPHALEXIN 500 MG: 500 CAPSULE ORAL at 12:36

## 2022-03-31 RX ADMIN — MIRTAZAPINE 15 MG: 15 TABLET, ORALLY DISINTEGRATING ORAL at 21:36

## 2022-03-31 RX ADMIN — CEPHALEXIN 500 MG: 500 CAPSULE ORAL at 05:32

## 2022-03-31 RX ADMIN — CARVEDILOL 3.12 MG: 6.25 TABLET, FILM COATED ORAL at 16:43

## 2022-03-31 RX ADMIN — PREGABALIN 50 MG: 50 CAPSULE ORAL at 08:30

## 2022-03-31 RX ADMIN — ALOGLIPTIN 6.25 MG: 6.25 TABLET, FILM COATED ORAL at 09:30

## 2022-03-31 RX ADMIN — SODIUM CHLORIDE, PRESERVATIVE FREE 10 ML: 5 INJECTION INTRAVENOUS at 10:30

## 2022-03-31 RX ADMIN — PREGABALIN 50 MG: 50 CAPSULE ORAL at 21:36

## 2022-03-31 RX ADMIN — CHLORTHALIDONE 25 MG: 25 TABLET ORAL at 08:30

## 2022-03-31 RX ADMIN — LEVOTHYROXINE SODIUM 50 MCG: 0.05 TABLET ORAL at 05:32

## 2022-03-31 RX ADMIN — DOXYCYCLINE HYCLATE 100 MG: 100 TABLET, FILM COATED ORAL at 21:36

## 2022-03-31 RX ADMIN — CEPHALEXIN 500 MG: 500 CAPSULE ORAL at 16:43

## 2022-03-31 RX ADMIN — HEPARIN SODIUM 5000 UNITS: 5000 INJECTION INTRAVENOUS; SUBCUTANEOUS at 21:37

## 2022-03-31 RX ADMIN — DOXYCYCLINE HYCLATE 100 MG: 100 TABLET, FILM COATED ORAL at 08:30

## 2022-03-31 ASSESSMENT — PAIN SCALES - GENERAL
PAINLEVEL_OUTOF10: 0
PAINLEVEL_OUTOF10: 10
PAINLEVEL_OUTOF10: 7
PAINLEVEL_OUTOF10: 10

## 2022-03-31 NOTE — PROGRESS NOTES
Comprehensive Nutrition Assessment    Type and Reason for Visit:  Reassess    Nutrition Recommendations/Plan:       Nutrition Assessment:  Rehab admit with recent acute stay for weakness, UTI sepsis. Remaind on carb controlled diet, 75g/meal, with frozen and pudding oral nutrition supplements. Recent meal intake %, improving intake past week. Will follow at moderate nutriton risk at this time. Malnutrition Assessment:  Malnutrition Status: At risk for malnutrition (Comment)    Context:  Acute Illness       Estimated Daily Nutrient Needs:  Energy (kcal):  0320-4525; Weight Used for Energy Requirements:  Current     Protein (g):  70-84 (1-1.2 g/kg); Weight Used for Protein Requirements:  Ideal        Fluid (ml/day):  2000; Method Used for Fluid Requirements:  1 ml/kcal      Nutrition Related Findings:  receiving visit from wound care on visit, remains on remeron, hx DM, CKD, potassium elevated at times, POCT some over 200 mg/dL      Wounds:  Wound Consult Pending (red skin folds)       Current Nutrition Therapies:    ADULT DIET; Regular; 5 carb choices (75 gm/meal)  ADULT ORAL NUTRITION SUPPLEMENT; Breakfast; Fortified Pudding Oral Supplement  ADULT ORAL NUTRITION SUPPLEMENT; Lunch, Dinner; Frozen Oral Supplement    Anthropometric Measures:  · Height: 5' 8\" (172.7 cm)  · Current Body Weight: 205 lb 0.4 oz (93 kg)   · Admission Body Weight: 201 lb 11.5 oz (91.5 kg)    · Usual Body Weight: 204 lb (92.5 kg) (per hx)     · Ideal Body Weight: 154 lbs; % Ideal Body Weight 133.1 %   · BMI: 31.2  · Adjusted Body Weight:  ; No Adjustment   · BMI Categories: Obese Class 1 (BMI 30.0-34. 9)       Nutrition Diagnosis:   · Predicted inadequate energy intake related to increase demand for energy/nutrients as evidenced by poor intake prior to admission      Nutrition Interventions:   Food and/or Nutrient Delivery:  Continue Current Diet,Continue Oral Nutrition Supplement  Nutrition Education/Counseling:  No recommendation at this time   Coordination of Nutrition Care:  Continue to monitor while inpatient    Goals:  Patient will consume at least 75% of meals during stay       Nutrition Monitoring and Evaluation:   Behavioral-Environmental Outcomes:  None Identified   Food/Nutrient Intake Outcomes:  Food and Nutrient Intake,Supplement Intake  Physical Signs/Symptoms Outcomes:  Biochemical Data,Meal Time Behavior,Skin,Weight     Discharge Planning:    Continue current diet,Continue Oral Nutrition Supplement     Electronically signed by Kassandra Astudillo RD, LD on 3/31/22 at 3:12 PM EDT    Contact: [phone]

## 2022-03-31 NOTE — PROGRESS NOTES
Occupational Therapy                              [facility] ARU OCCUPATIONAL THERAPY EVALUATION    Chart Review:  Past Medical History:   Diagnosis Date    Acute urinary tract infection 3/15/2012    Ataxia 2010    Diabetes mellitus ([address] 2011    type 2, controlled    Fusion of spine of cervical region 10/2012    Gait disturbance 2011    History of prostate cancer 1996    adenocarcinoma    History of tobacco use 1959    Hyperlipidemia 2011    Osteoarthritis 2011     Past Surgical History:   Procedure Laterality Date    BLADDER SURGERY      BLADDER SURGERY Left 3/17/2022    CYSTOSCOPY LEFT STENT EXCHANGE performed by Gavin Edmond MD at [place] [address]  3/28/13    Cysto with removal of artificial sphinter and placement of suprapubic catheter.     PROSTATE SURGERY      PROSTATECTOMY  1996    infection     Social History:  Social/Functional History  Lives With: Son  Type of Home: House  Home Layout: One level  Home Access: Ramped entrance  Bathroom Shower/Tub: Tub/Shower unit,Shower chair with back  Bathroom Toilet: Standard  Bathroom Equipment: Grab bars in shower,Shower chair  Bathroom Accessibility: Leo Villatoro accessible (states once he is in bathroom he uses countertops and grab bars, but pt not able to clarify if this is because there is little room or just preference)  Home Equipment: [address] walker (pt thinks there may be a BSC from his wife in storage, but unsure)  ADL Assistance: Independent  Homemaking Assistance: Independent  Homemaking Responsibilities: Yes  Meal Prep Responsibility: Secondary  Laundry Responsibility: Primary  Cleaning Responsibility: Secondary  Bill Paying/Finance Responsibility: Primary  Shopping Responsibility: Secondary  Health Care Management: Primary (has pill box with alarms per pt. until recently [address] would set up, but once [facility] stopped he managed on own)  Ambulation Assistance: Independent (mod I with 4ww for up to 150'. pt used electronic carts at stores when able)  Transfer Assistance: Independent  Active : No  Patient's  Info: 3 sons assist in driving. Mode of Transportation: Car,SUV  Occupation: Retired  Type of occupation: Retired from [address] [place] [address] Street: TV, very little reading, no pets, doctors, son manages groceries,  Meds are managed via pill dispenser that son manages. Pt occasionally writes checks and some auto pay  Additional Comments: pt sleeps in flat bed. Pt has fallen >4 times in past year. Pt attributes many to low BS. No injury    Restrictions:  Restrictions/Precautions  Restrictions/Precautions: General Precautions,Fall Risk,Contact Precautions (suprapubic catheter)                  Pain Level: 10       Objective:  Observation/Palpation  Posture: Poor  Observation: Pt in high fowlers on approach, alert, however presents with significantly delayed initiation and processing. Pleasantly confused, agreeable to evaluation. Required increased time for all tasks  Body Mechanics: Forward flexed in stance             Vision  Vision: Impaired  Vision Exceptions: Wears glasses at all times  Vision - Basic Assessment  Prior Vision: Wears glasses all the time  Patient Visual Report: No visual complaint reported.   Hearing  Hearing: Exceptions to [facility]  Hearing Exceptions: Hard of hearing/hearing concerns    ROM:      LUE AROM (degrees)  LUE AROM : Exceptions  L Shoulder Flexion 0-180: ~90*  L Elbow Flexion 0-145: WFL     Left Hand AROM (degrees)  Left Hand AROM: WFL     RUE AROM (degrees)  RUE AROM : WFL  R Shoulder Flexion 0-180: ~100*  R Elbow Flexion 0-145: WFL     Right Hand AROM (degrees)  Right Hand AROM: WFL    Strength:    LUE Strength  Gross LUE Strength: Exceptions to [facility]  L Shoulder Flex: 2+/5  L Elbow Flex: 3+/5  L Hand General: 3+/5  RUE Strength  Gross RUE Strength: Exceptions to [facility]  R Shoulder Flex: 2+/5  R Elbow Flex: 3+/5  R Hand General: 3+/5    Quality of Movement: Tone RUE  RUE Tone: Normotonic  Tone LUE  LUE Tone: Normotonic  Coordination  Movements Are Fluid And Coordinated: No  Coordination and Movement description: Fine motor impairments,Decreased speed,Decreased accuracy,Right UE,Left UE  Quality of Movement Other  Comment: Likely related to lethargy? Sensation:    Sensation  Overall Sensation Status: Impaired (Pt reports intermittent n/t in B hands and feet, but is intact to light touch on B feet this am)     ADLs:  Eating: Eating  Assistance Needed: Setup or clean-up assistance  Comment: assist to open packages/containers  CARE Score: 5  Discharge Goal: Independent       Oral Hygiene: Oral Hygiene  Assistance Needed: Supervision or touching assistance  Comment: seated, increased time for object manipulation  CARE Score: 4  Discharge Goal: Independent    UB/LB Bathing: Shower/Bathe Self  Assistance Needed: Partial/moderate assistance  Comment: required assist to bathe bottom and distal BLEs  CARE Score: 3  Discharge Goal: Supervision or touching assistance    UB Dressing: Upper Body Dressing  Assistance Needed: Partial/moderate assistance  Comment: able to thread BUEs, required assist to pull overhead, then able to pull down in front/back  CARE Score: 3  Discharge Goal: Set-up or clean-up assistance         LB Dressing:   Lower Body Dressing  Assistance Needed: Dependent  Comment: poor distal reach and BLE strength impacting performance, required total assist to thread BLEs and catheter in brief and pants, perform pants management up  CARE Score: 1  Discharge Goal: Supervision or touching assistance    Donning and [place] Footwear: Putting On/Taking Off Footwear  Assistance Needed: Dependent  Comment: poor distal reach and strength, required total A to doff/don hospital socks  CARE Score: 1  Discharge Goal: Supervision or touching assistance      Toileting:  Toileting Hygiene  Assistance Needed: Dependent  Comment: 2 person assist for balance, pants management/bowel hygiene  CARE Score: 1  Discharge Goal: Supervision or touching assistance      Bed Mobility:    Bed mobility  Supine to Sit: Maximum assistance (with HOB significantly elevated, cues for body mechanics)  Sit to Supine: Moderate assistance (for BLEs)    Transfers:    Transfers  Stand Pivot Transfers: 2 Person assistance (Ranged from mod-max of 2, significant difficulty weightshifting to advance either LE, also required max cues)  Sit to stand: Maximum assistance  Stand to sit: Moderate assistance     Shower Transfers  Shower Transfers: Not tested  [employer] Transfers Comments: 2* safety concerns     Toilet Transfer  Assistance Needed: Dependent  Comment: performed x2 during OT eval; ranged from mod-max A x2 with fatigue. Required significant cues for body mechanics and hand placement  CARE Score: 1  Discharge Goal: Supervision or touching assistance    Functional Mobility:    Balance  Sitting Balance: Stand by assistance  Standing Balance: Minimal assistance  Standing Balance  Time: ~1 min  Activity: ADLs  Comment: Pt required BUE support to maintain balance  Functional Mobility  Functional Mobility Comments: Therapist propelled W/C to conserve energy for ADLs     Cognition:  Cognition  Overall Cognitive Status: Exceptions  Arousal/Alertness: Delayed responses to stimuli  Following Commands:  Follows one step commands with increased time  Attention Span: Attends with cues to redirect  Problem Solving: Assistance required to identify errors made,Assistance required to correct errors made  Initiation: Requires cues for some  Sequencing: Requires cues for some    Perception:  Perception  Overall Perceptual Status: WFL      Assessment:     Performance deficits / Impairments: Decreased functional mobility ,Decreased ADL status,Decreased ROM,Decreased strength,Decreased cognition,Decreased endurance,Decreased balance,Decreased sensation,Decreased high-level IADLs,Decreased posture    The patient is a 81 year will perform therex/therax to facilitate increased strength/endurance/ax tolerance (c emphasis on dynamic standing balance >8 mins, BUE endurance, cognitive retraining) c SBA    Plan:    Pt will be seen at least 60 minutes per day for a minimum of 5 days per week, plus group therapy as appropriate  Current Treatment Recommendations: Strengthening,ROM,Balance Training,Functional Mobility Training,Endurance Training,Pain Management,Safety Education & Training,Patient/Caregiver Education & Training,Equipment Evaluation, Education, & procurement,Self-Care / ADL,Home Management Training,Cognitive/Perceptual Training    OT Individual Minutes  Time In: 1310  Time Out: 1430  Minutes: 80                Number of Minutes/Billable Intervention      OT Evaluation 20   Therapeutic Exercise    ADL Self-care 60   Neuro Re-Ed    Therapeutic Activity    Group    Other:    TOTAL 80     Electronically signed by:    Andres Khoury MS, OTR/L  License #[id number]  3/31/2022, 3:23 PM

## 2022-03-31 NOTE — PROGRESS NOTES
Nephrology Progress Note  3/31/2022 2:44 PM        Subjective:   Admit Date: 3/30/2022  PCP: Janak Nelson MD    Interval History: Patient seen in early morning, this is a late entry    Diet: Reasonable    ROS: He was transferred to acute rehab unit  Slowly improving  No shortness of breath or confusion  Urine output recorded only 850 cc for the last shift  Acceptable blood pressure no fever    Data:     Current meds:    insulin glargine  25 Units SubCUTAneous Nightly    heparin (porcine)  5,000 Units SubCUTAneous 3 times per day    sodium chloride flush  5-40 mL IntraVENous 2 times per day    allopurinol  100 mg Oral Daily    alogliptin  6.25 mg Oral Daily    aspirin  81 mg Oral Daily    atorvastatin  40 mg Oral Nightly    carvedilol  3.125 mg Oral BID WC    chlorthalidone  25 mg Oral Daily    glipiZIDE  15 mg Oral BID AC    insulin lispro  0-12 Units SubCUTAneous TID WC    insulin lispro  0-12 Units SubCUTAneous Nightly    levothyroxine  50 mcg Oral Daily    mirtazapine  15 mg Oral Nightly    pregabalin  50 mg Oral BID    cephALEXin  500 mg Oral 4 times per day    doxycycline hyclate  100 mg Oral 2 times per day      dextrose           I/O last 3 completed shifts:   In: 240 [P.O.:240]  Out: 850 [Urine:850]    CBC:   Recent Labs     03/29/22  0726 03/30/22  0153 03/30/22  0928   WBC 11.2*  --  10.4   HGB 7.9* 7.9* 9.0*   *  --  504*          Recent Labs     03/29/22  0726 03/30/22  0928    134*   K 5.0 5.3*    99   CO2 21 20*   BUN 53* 54*   CREATININE 2.3* 2.2*   GLUCOSE 188* 270*       Lab Results   Component Value Date    CALCIUM 9.0 03/30/2022    PHOS 5.0 (H) 03/29/2022       Objective:     Vitals: BP (!) 150/65   Pulse 80   Temp 98.1 °F (36.7 °C)   Resp 16   Ht 5' 8\" (1.727 m)   Wt 205 lb 0.4 oz (93 kg)   SpO2 99%   BMI 31.17 kg/m²     General appearance: Alert, awake and oriented  HEENT: Mild conjunctival pallor  Neck: Supple  Lungs: No gross crackles but has coarse breath sound  Heart: Regular rate and rhythm  Abdomen: Soft protuberant abdomen  Extremities: 1+ edema      Problem List :         Impression :     1. Acute kidney injury with underlying CKD stage IV A3-we will watch the urine output, had about 1.1 g/day of proteinuria-we will monitor kidney function closely  2. Mild hyperkalemia-we will redo the lab tomorrow  3. He does have ureteral obstruction and stent placement-as well as chronic Hopper catheter  4. Recent sepsis with gram-negative colton  5. Multiple other chronic disease such as diabetes, hypertension and dysrhythmia    Recommendation/Plan  :     1. Low-salt, DASH diet  2. CMP, mag, Phos tomorrow morning  3. Also CBC differential  4. Watch for infection mainly genitourinary  5.  Follow clinically      Kristy Metcalf MD MD

## 2022-03-31 NOTE — PROGRESS NOTES
Physical Therapy  . [facility] PHYSICAL THERAPY EVALUATION    Chart Review:  Past Medical History:   Diagnosis Date    Acute urinary tract infection 3/15/2012    Ataxia 2010    Diabetes mellitus ([address] 2011    type 2, controlled    Fusion of spine of cervical region 10/2012    Gait disturbance 2011    History of prostate cancer 1996    adenocarcinoma    History of tobacco use 1959    Hyperlipidemia 2011    Osteoarthritis 2011     Past Surgical History:   Procedure Laterality Date    BLADDER SURGERY      BLADDER SURGERY Left 3/17/2022    CYSTOSCOPY LEFT STENT EXCHANGE performed by Sarah Alas MD at [place] [address]  3/28/13    Cysto with removal of artificial sphinter and placement of suprapubic catheter.     PROSTATE SURGERY      PROSTATECTOMY  1996    infection     Fall History: >4 falls in past year with no injury  Social History:  Social/Functional History  Lives With: Son  Type of Home: House  Home Layout: One level  Home Access: Ramped entrance  Bathroom Shower/Tub: Tub/Shower unit,Shower chair with back  Bathroom Toilet: Standard  Bathroom Equipment: Grab bars in shower,Shower chair  Bathroom Accessibility: Rachana Abigail accessible (states once he is in bathroom he uses countertops and grab bars, but pt not able to clarify if this is because there is little room or just preference)  Home Equipment: [address] walker (pt thinks there may be a BSC from his wife in storage, but unsure)  ADL Assistance: Independent  Homemaking Assistance: Independent  Homemaking Responsibilities: Yes  Meal Prep Responsibility: Secondary  Laundry Responsibility: Primary  Cleaning Responsibility: Secondary  Bill Paying/Finance Responsibility: Primary  Shopping Responsibility: Secondary  Health Care Management: Primary (has pill box with alarms per pt. until recently [address] would set up, but once [address] stopped he managed on own)  Ambulation Assistance: Independent (mod I with 4ww for up to 150'. pt used electronic carts at stores when able)  Transfer Assistance: Independent  Active : No  Patient's  Info: 3 sons assist in driving. Mode of Transportation: Car,SUV  Occupation: Retired  Type of occupation: Retired from [address] [place] [address] Street: TV, very little reading, no pets, doctors, son manages groceries,  Meds are managed via pill dispenser that son manages. Pt occasionally writes checks and some auto pay  Additional Comments: pt sleeps in flat bed. Pt has fallen >4 times in past year. Pt attributes many to low BS. No injury    Restrictions:  Restrictions/Precautions  Restrictions/Precautions: General Precautions,Fall Risk            Pain Level: 10       Objective:  Orientation  Orientation Level: Oriented to place,Oriented to person,Disoriented to time,Oriented to situation        Vision  Vision: Impaired  Vision Exceptions: Wears glasses at all times  Hearing  Hearing: Exceptions to [facility]  Hearing Exceptions: Hard of hearing/hearing concerns    ROM:       PROM WFL but somewhat limited by body habitus/edema/pain                       Strength:    Strength RLE  Comment: knee extension: 2+/5, ankle DF: 3+/5, hip flexion 2+/5  Strength LLE  Comment: knee extension: 2-/5, ankle DF: 3/5, hip flexion2-/5-pain limited throughout LLE              Bed Mobility:   Lying to Sitting on Side of Bed  Assistance Needed: Substantial/maximal assistance  Comment: assist for BLE and trunk  CARE Score: 2  Discharge Goal: Independent  Roll Left and Right  Assistance Needed: Substantial/maximal assistance  Comment: max B, no rails, but HOB slightly elevated due to back pain. CARE Score: 2  Discharge Goal: Independent  Sit to Lying  Assistance Needed: Substantial/maximal assistance  Comment: assist with BLE and trunk  CARE Score: 2  Discharge Goal: Independent    Transfers:    Sit to Stand  Comment: Pt unable to keep balance in sitting, becoming less alert.  returned to supine. feel pt may be able to perform next session  Reason if not Attempted: Not attempted due to medical condition or safety concerns  CARE Score: 88  Discharge Goal: Independent  Chair/Bed-to-Chair Transfer  Comment: feel pt may be able to perform next session  Reason if not Attempted: Not attempted due to medical condition or safety concerns  CARE Score: 88  Discharge Goal: Independent     Car Transfer  Comment: feel pt may be able to attempt next session  Reason if not Attempted: Not attempted due to medical condition or safety concerns  CARE Score: 88  Discharge Goal: Supervision or touching assistance    Ambulation:   Device used PTA: 4ww   Walking Ability  Does the Patient Walk?: No     Walk 10 Feet  Comment: pt not able to remain alert to attempt.  do not feel pt will be able to perform in next session  Reason if not Attempted: Not attempted due to medical condition or safety concerns  CARE Score: 88  Discharge Goal: Independent     Walk 50 Feet with Two Turns  Reason if not Attempted: Not attempted due to medical condition or safety concerns  CARE Score: 88  Discharge Goal: Supervision or touching assistance     Walk 150 Feet  Reason if not Attempted: Not attempted due to medical condition or safety concerns  CARE Score: 88  Discharge Goal: Supervision or touching assistance     Walking 10 Feet on Uneven Surfaces  Reason if not Attempted: Not attempted due to medical condition or safety concerns  CARE Score: 88  Discharge Goal: Supervision or touching assistance     1 Step (Curb)  Reason if not Attempted: Not attempted due to medical condition or safety concerns  CARE Score: 88  Discharge Goal: Supervision or touching assistance     4 Steps  Reason if not Attempted: Not attempted due to medical condition or safety concerns  CARE Score: 88  Discharge Goal: Supervision or touching assistance     12 Steps  Reason if not Attempted: Not applicable  CARE Score: 9  Discharge Goal: Not Applicable         Wheelchair:  w/c Ability: Wheelchair Ability  Uses a Wheelchair and/or Scooter?: No                Balance:     sitting EOB pt began to lean to R, stated he could feel he was leaning, but could not correct without min to mod assist. At this time pt also with decreasing level of alertness. Returned pt to supine and nurse came to assess vitals and BS which were [facility], nursing felt possibly 5mg of Oxycodone earlier was causing his lethargy.  Object: Picking Up Object  Reason if not Attempted: Not attempted due to medical condition or safety concerns  CARE Score: 88  Discharge Goal: Supervision or touching assistance    Assessment:   The patient is a 78year old male admitted onto ARU after hospitalization for AMS. CT of the head was without acute intracranial pathology. Chest x-ray showed evidence of pulmonary edema and his BNP was elevated. Pt found to have UTI(hx of ESBL) with sepsis, encephalopathy and CKD. Pt has comorbidites of DM with peripheral neuropathy, OA, recurrent UTI that may impact rehab. Pt reports being mod I at home with 4ww, but cognition today is suspect and question reliability as historian. Pt presents this date requiring max assist for all bed mobility and unable to stay upright and alert to progress to transfers. Pt also c/o LLE pain limiting him(pt c/o RLE pain in notes from PT yesterday, no c/o that pain today). Feel pt will benefit from ARU-PT to address deficits as noted and as pt had performed better per acute care therapy notes, goals are set for mod I to supervision.       Body structures, Functions, Activity limitations: Decreased functional mobility ,Decreased strength,Decreased endurance,Decreased posture,Decreased safe awareness,Decreased sensation,Decreased high-level IADLs,Decreased ADL status,Decreased cognition,Decreased balance,Increased pain     Prognosis: Guarded,Fair (pending improved level of alertness and cognition as well as pain mgmt)  Decision 3/31/2022,

## 2022-03-31 NOTE — PROGRESS NOTES
A Complete drug regimen review was completed for this patient this date. []  No clinically significant medication issue was identified   [x]  Yes, a clinically significant medication issue was identified     []  Adverse Drug Event:    []  Allergy:    []  Side Effect:    []  Ineffective Therapy:    []  Drug interaction:     []  Duplicate Therapy:    []  Untreated Indication:    []  Non-adherence:    [x]  Other:Heparin warning  Nursing contacted the physician:Dr Eva Chou       Date:  3/30/2022              Time:930pm    Actions recommended by physician were completed:   Date:                 Time:  Action(s) Taken:             []  New Physician Order Received    [x]  Issue Noted by Physician;  However No Action Required    []  Other:

## 2022-03-31 NOTE — CARE COORDINATION
Case Management Admission Note      Patient:Greg Quintero      :1940  ASO:[id number]  Rehab Dx/Hx: Metabolic encephalopathy [A77.25]  Metabolic encephalopathy [X26.99]    Chief Complaint:   Past Medical History:   Diagnosis Date    Acute urinary tract infection 3/15/2012    Ataxia 2010    Diabetes mellitus ([facility]     type 2, controlled    Fusion of spine of cervical region 10/2012    Gait disturbance     History of prostate cancer     adenocarcinoma    History of tobacco use     Hyperlipidemia     Osteoarthritis      Past Surgical History:   Procedure Laterality Date    BLADDER SURGERY      BLADDER SURGERY Left 3/17/2022    CYSTOSCOPY LEFT STENT EXCHANGE performed by Jose F Bragg MD at [place] [address]  3/28/13    Cysto with removal of artificial sphinter and placement of suprapubic catheter.  PROSTATE SURGERY      PROSTATECTOMY      infection     No Known Allergies  Precautions: falls    Date of Admit: 3/30/2022  Room #: 1025/1025-A      Current functional status at time of admit:        Home Living/DME Available:      Type of Home: House  Home Access: Ramped entrance  Bathroom Shower/Tub: Tub/Shower unit,Shower chair with back  Bathroom Toilet: Standard  Bathroom Equipment: Grab bars in [facility] chair  Home Equipment: [address] walker (pt thinks there may be a BSC from his wife in storage, but unsure)       IADL Hx:   Homemaking Responsibilities: Yes  Active : No  Mode of Transportation: Car,SUV  Occupation: Retired  Leisure & Hobbies: TV, very little reading, no pets, doctors, son manages groceries,  Meds are managed via pill dispenser that son manages. Pt occasionally writes checks and some auto pay       Spouse: None. Family: Sons Alejandra Cyr, and Jon (lives with Katie Reynolds)    Comments: LSW met with patient for admission assessment.   Patient lives with son Katie Reynolds in a 1-level home in [place]. There are no steps to enter (ramp) and no steps inside. Patient reports tub/shower for bathing and bedroom and bathroom are on the main level. Patient has PCP, was independent in ADLs PTA, and uses [facility] in [facility] for prescriptions. Patient states he has a rollator, rolling walker, shower chair with back, cane, and grab bars in the shower at home and no pre-existing HHC (used [facility] in past and would like to use again). Patient reports access to food, utilities, and shelter and feels safe in his environment. BIMS completed in initial assessment by . Plan is to D/C home with son, with Tyrell Hodge and DME as recommended by therapy staff. F/U to be set with Dr. José Luis Grant and family will transport home.     DAWIT Vilchis, SUKHWINDER, 3/31/2022, 3:26 PM

## 2022-03-31 NOTE — PROGRESS NOTES
4 Eyes Skin Assessment     NAME:  Ely Mahoney  YOB: 1940  MEDICAL RECORD NUMBER:  [id number]    The patient is being assess for  Admission    I agree that 2 RN's have performed a thorough Head to Toe Skin Assessment on the patient. ALL assessment sites listed below have been assessed. Areas assessed by both nurses: scattered bruising and red moist abdominal folds    Head, Face, Ears, Shoulders, Back, Chest, Arms, Elbows, Hands, Sacrum. Buttock, Coccyx, Ischium and Legs. Feet and Heels        Does the Patient have a Wound? Yes wound(s) were present on assessment.  LDA wound assessment was Initiated and completed        Judd Prevention initiated:  Yes   Wound Care Orders initiated:  Yes    Pressure Injury (Stage 3,4, Unstageable, DTI, NWPT, and Complex wounds) if present place consult order under [de-identified] No    New and Established Ostomies if present place consult order under : No      Nurse 1 eSignature: Laura Chen RN    **SHARE this note so that the co-signing nurse is able to place an eSignature**    Nurse 2 eSignature: GREG PadillaN, RN

## 2022-03-31 NOTE — H&P
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 History of present illness: Patient is an 53-year-old right-hand-dominant male with a history of diabetes, hypertension, prostate cancer and kidney disease who presented to our ED in the evening of 3/16/2022. He had been progressively more confused and lethargic at home throughout that day. Recently he had had some episodes of nausea and vomiting. His oral intake has been diminished. There is no reported fever, seizure or trauma to speak of. His initial vital signs showed some hypotension and he appeared dehydrated. He is given IV fluids and Lasix because of an elevated BNP. His urinalysis was abnormal and he had elevated white blood count. Urine cultures were eventually positive and he was treated for sepsis due to a urinary tract infection. He required IV hydration, management of his cardiac arrhythmia and blood sugar issues. He has received 1 week of IV antibiotics and is being transitioned to an oral agent for a second week. He has become too weak to do his own toileting, transfers and self-care and cannot return directly home. He requires inpatient rehabilitation to address these issues. Review of systems: Review of systems complicated by his lethargy today. Recently he has been participating some in therapies but he is unable to complete the ROS at this time.   The remainder of their review of systems was negative except as mentioned in the history of present function: Maximum verbal cues and moderate physical assistance needed for mobility and self-care. Allergies:  Patient has no known allergies. Past Medical History:   Past Medical History:   Diagnosis Date    Acute urinary tract infection 3/15/2012    Ataxia 2010    Diabetes mellitus ([address] 2011    type 2, controlled    Fusion of spine of cervical region 10/2012    Gait disturbance 2011    History of prostate cancer 1996    adenocarcinoma    History of tobacco use 1959    Hyperlipidemia 2011    Osteoarthritis 2011        Past Surgical History:     Past Surgical History:   Procedure Laterality Date    BLADDER SURGERY      BLADDER SURGERY Left 3/17/2022    CYSTOSCOPY LEFT STENT EXCHANGE performed by Vivian Jimenez MD at [place] [address]  3/28/13    Cysto with removal of artificial sphinter and placement of suprapubic catheter.     PROSTATE SURGERY      PROSTATECTOMY  1996    infection       Current Medications:     Current Facility-Administered Medications:     acetaminophen (TYLENOL) tablet 650 mg, 650 mg, Oral, Q6H PRN **OR** [DISCONTINUED] acetaminophen (TYLENOL) suppository 650 mg, 650 mg, Rectal, Q6H PRN, Samantha Del Castillo MD    heparin (porcine) injection 5,000 Units, 5,000 Units, SubCUTAneous, 3 times per day, Samantha Del Castillo MD, 5,000 Units at 03/30/22 2245    [START ON 3/31/2022] sodium chloride flush 0.9 % injection 5-40 mL, 5-40 mL, IntraVENous, 2 times per day, Samantha Del Castillo MD    sodium chloride flush 0.9 % injection 5-40 mL, 5-40 mL, IntraVENous, PRN, Samantha Del Castillo MD    dextrose 5 % solution, 100 mL/hr, IntraVENous, PRN, Samantha Del Castillo MD    glucagon (rDNA) injection 1 mg, 1 mg, IntraMUSCular, PRN, MD Tenzin [address] ON 3/31/2022] allopurinol (ZYLOPRIM) tablet 100 mg, 100 mg, Oral, Daily, Samantha Del Castillo MD  [place]  [START ON 3/31/2022] alogliptin (NESINA) tablet 6.25 mg, 6.25 mg, Oral, Daily, Samantha Del Castillo MD    [START ON 3/31/2022] aspirin chewable tablet 81 mg, 81 mg, Oral, Daily, Darian Medel MD    atorvastatin (LIPITOR) tablet 40 mg, 40 mg, Oral, Nightly, Darian Medel MD, 40 mg at 03/30/22 2245    [START ON 3/31/2022] carvedilol (COREG) tablet 3.125 mg, 3.125 mg, Oral, BID , MD Emmanuel Russell  [START ON 3/31/2022] chlorthalidone (HYGROTON) tablet 25 mg, 25 mg, Oral, Daily, Darian Medel MD    dextrose bolus (hypoglycemia) 10% 125 mL, 125 mL, IntraVENous, PRN **OR** dextrose bolus (hypoglycemia) 10% 250 mL, 250 mL, IntraVENous, PRN, MD Emmanuel Russell  [START ON 3/31/2022] glipiZIDE (GLUCOTROL) tablet 15 mg, 15 mg, Oral, BID AC, Darian Medel MD    insulin glargine (LANTUS) injection vial 20 Units, 20 Units, SubCUTAneous, Nightly, MD Emmanuel Russell  [START ON 3/31/2022] insulin lispro (HUMALOG) injection vial 0-12 Units, 0-12 Units, SubCUTAneous, TID , Darian Medel MD    insulin lispro (HUMALOG) injection vial 0-12 Units, 0-12 Units, SubCUTAneous, Nightly, MD Emmanuel Russell  [START ON 3/31/2022] levothyroxine (SYNTHROID) tablet 50 mcg, 50 mcg, Oral, Daily, Darian Medel MD    mirtazapine (REMERON SOL-TAB) disintegrating tablet 15 mg, 15 mg, Oral, Nightly, Darian Medel MD, 15 mg at 03/30/22 2245    oxyCODONE (ROXICODONE) immediate release tablet 5 mg, 5 mg, Oral, Q4H PRN, Darian Medel MD    pregabalin (LYRICA) capsule 50 mg, 50 mg, Oral, BID, Darian Medel MD, 50 mg at 03/30/22 2245    [START ON 3/31/2022] cephALEXin (KEFLEX) capsule 500 mg, 500 mg, Oral, 4 times per day, Darian Medel MD    doxycycline hyclate (VIBRA-TABS) tablet 100 mg, 100 mg, Oral, 2 times per day, Darian Medel MD, 100 mg at 03/30/22 2245    acetaminophen (TYLENOL) tablet 650 mg, 650 mg, Oral, Q4H PRN, GLEN Freire MD    polyethylene glycol [facility]) packet 17 g, 17 g, Oral, Daily PRN, GLEN Freire MD    bisacodyl (DULCOLAX) suppository 10 mg, 10 mg, Rectal, Daily PRN, GLEN Freire MD    Family History:   Family History   Problem Relation Age of Onset   [place] Cancer Mother     Diabetes Father     Heart Disease Father     High Blood Pressure Father     Diabetes Sister     High Blood Pressure Sister     Cancer Brother         prostate, breast    Diabetes Brother     Cancer Maternal Uncle     Diabetes Maternal Grandmother     Stroke Maternal Grandfather        Exam:    Blood pressure (!) 142/60, pulse 85, temperature 98 °F (36.7 °C), temperature source Oral, resp. rate 16, height 5' 8\" (1.727 m), weight 206 lb (93.4 kg), SpO2 99 %. General: The patient was seen recumbent in bed. He is somewhat lethargic. He will open his eyes and participate in some conversation but he appears to fall back asleep again. HEENT: No trauma noted to his head or neck. MMM. No JVD. Pulmonary: Shallow respirations with blunted breath sounds inferiorly. No wheezes or rales. Cardiac: Regular rhythm at this time though his rate is slightly up. Abdomen: Patient's abdomen was soft and nondistended. Bowel sounds were present throughout. There was no rebound, guarding or masses noted. Mature suprapubic catheterization site without drainage noted. Spinal exam: Prominent bony landmarks without breakdown of skin. Upper extremities: Slow and deliberate bring his hands up to me mine. Give way with MMT. Normal tone without tremor. No deep tendon reflexes. Lower extremities: Blunted sensation about the feet. No reflexes. 1+ edema in the calves and lower limbs. Some bruising about his flank and lower legs. Sitting balance was fair-.  Standing balance was poor.     Lab Results   Component Value Date    WBC 10.4 03/30/2022    HGB 9.0 (L) 03/30/2022    HCT 29.1 (L) 03/30/2022    MCV 95.1 03/30/2022     (H) 03/30/2022     Lab Results   Component Value Date    INR 1.20 03/17/2022    INR 0.99 05/19/2015    INR 1.31 03/23/2013    PROTIME 15.5 (H) 03/17/2022    PROTIME 11.3 05/19/2015    PROTIME 14.2 03/23/2013     Lab Results   Component Value Date    CREATININE 2.2 (H) 03/30/2022    BUN 54 (H) 03/30/2022     (L) 03/30/2022    K 5.3 (H) 03/30/2022    CL 99 03/30/2022    CO2 20 (L) 03/30/2022     Lab Results   Component Value Date    ALT 13 03/30/2022    AST 14 (L) 03/30/2022    ALKPHOS 118 03/30/2022    BILITOT 0.2 03/30/2022         Impression: 75-year-old male with a history of hypertension, diabetes, kidney disease and prostate cancer who was suffered mental status changes associated with due to UTI and SVT. Strengths for the patient: Some experience with adaptive equipment, a supportive family and a reasonably accessible home. Limitations/barriers for the patient: His age, his multiple medical comorbidities and his risk for recurrent infections. Recommendation: Acute inpatient rehabilitation with occupational and physical therapy 180 minutes 5 out of every 7 days. Will address basic and  advancing mobility with self-care instruction and adaptive equipment training. Caregiver education will be offered. Expected length of stay  prior to a supervised level of function for discharge home with a walker and [facility] OT/PT is 18 days. Additional recommendation:    1. Metabolic encephalopathy with gait disturbance: Patient requires daily occupational and physical therapy with speech-language pathology. He was treated with a calm and consistent environment to help retain information. Trying to regulate his sleep schedule to improve daytime alertness. Minimizing medications that might sedate him. He needs adaptive equipment training, caregiver education and aggressive pulmonary hygiene measures. Nutritional support. Bowel bladder retraining. DVT prophylaxis. 2. DVT prophylaxis: Heparin 5000 units every 8 hours. I must monitor his hemoglobin and platelet count periodically while on this medication. Weightbearing activities will be pursued daily. GI prophylaxis offered.   3. Urinary tract infection: Is being transitioned from IV antibiotics to Vibramycin for another week. Encouraging oral hydration. Changing his suprapubic catheter monthly. Outpatient follow-up with urology. 4. Hypertension: Coreg and Hygroton for blood pressure regulation. Target systolic blood pressure is 120-140. Vital signs are checked at rest and with activity. 5. 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 Scheduled Humalog and Lantus. Hold Nesina. Lyrica for the neuropathy symptoms. Blood sugars are checked at mealtime and bedtime. 6. Prostate cancer: Continue with the suprapubic catheter with changes monthly. Encouraging oral hydration. Outpatient follow-up with urology. 7. SVT: Cautious thyroid replacement. Coreg for rate control. Vital signs are checked at rest and with activity. Daily weights to detect any decompensation associated with CHF. I personally performed a history and physical on this patient within 24 hours of admission to the rehab unit. I have reviewed the preadmission screening and concur with its findings without change. A detailed plan of care will be established by hospital day 4 and I attest the patient is appropriate for inpatient rehabilitation at this time. I have compared the patient's current functional status noted during my history and physical with that of the preadmission screen and I have found no significant differences.

## 2022-03-31 NOTE — CONSULTS
[facility] Continence Nurse  Consult Note       Dav Stone  AGE: 78 y.o. GENDER: male  : 1940  TODAY'S DATE:  3/31/2022    Subjective:     Reason for Evaluation and Assessment: wound care evalVenkatesh Stone is a 78 y.o. male referred by:   [x] Physician  [] Nursing  [] Other:     Wound Identification:  Wound Type: pressure and suprapubic catheter   Contributing Factors: diabetes, chronic pressure and decreased mobility        PAST MEDICAL HISTORY        Diagnosis Date    Acute urinary tract infection 3/15/2012    Ataxia 2010    Diabetes mellitus ([address] 2011    type 2, controlled    Fusion of spine of cervical region 10/2012    Gait disturbance     History of prostate cancer     adenocarcinoma    History of tobacco use     Hyperlipidemia     Osteoarthritis        PAST SURGICAL HISTORY    Past Surgical History:   Procedure Laterality Date    BLADDER SURGERY      BLADDER SURGERY Left 3/17/2022    CYSTOSCOPY LEFT STENT EXCHANGE performed by Vivian Jimenez MD at [place] [id number]  3/28/13    Cysto with removal of artificial sphinter and placement of suprapubic catheter.  PROSTATE SURGERY      PROSTATECTOMY      infection       FAMILY HISTORY    Family History   Problem Relation Age of Onset    Cancer Mother     Diabetes Father     Heart Disease Father     High Blood Pressure Father     Diabetes Sister     High Blood Pressure Sister     Cancer Brother         prostate, breast    Diabetes Brother     Cancer Maternal Uncle     Diabetes Maternal Grandmother     Stroke Maternal Grandfather        SOCIAL HISTORY    Social History     Tobacco Use    Smoking status: Former Smoker     Packs/day: 0.50     Quit date: 1976     Years since quittin.7    Smokeless tobacco: Never Used   Vaping Use    Vaping Use: Not on file   Substance Use Topics    Alcohol use: No     Comment: Quit in     Drug use:  No ALLERGIES    No Known Allergies    MEDICATIONS    No current facility-administered medications on file prior to encounter. Current Outpatient Medications on File Prior to Encounter   Medication Sig Dispense Refill    allopurinol (ZYLOPRIM) 100 MG tablet Take 100 mg by mouth daily      atorvastatin (LIPITOR) 80 MG tablet Take 80 mg by mouth daily      pregabalin (LYRICA) 50 MG capsule Take 50 mg by mouth 2 times daily.  albuterol sulfate HFA (PROVENTIL HFA) 108 (90 Base) MCG/ACT inhaler Inhale 2 puffs into the lungs every 4 hours as needed for Wheezing or Shortness of Breath With spacer (and mask if indicated). Thanks. 18 g 1    acetaminophen (TYLENOL) 325 MG tablet Take 2 tablets by mouth every 6 hours as needed for Pain 120 tablet 3    amLODIPine (NORVASC) 10 MG tablet Take 1 tablet by mouth daily 30 tablet 0    lisinopril (PRINIVIL;ZESTRIL) 5 MG tablet Take 1 tablet by mouth daily 30 tablet 2    Insulin Detemir (LEVEMIR SC) Inject 40 Units into the skin In the morning      Insulin Detemir (LEVEMIR FLEXPEN SC) Inject 10 Units into the skin nightly      docusate sodium (COLACE, DULCOLAX) 100 MG CAPS Take 100 mg by mouth 2 times daily as needed for Constipation. 20 capsule 0    glimepiride (AMARYL) 4 MG tablet Take 4 mg by mouth every morning (before breakfast).            Objective:      BP (!) 150/65   Pulse 80   Temp 98.1 °F (36.7 °C)   Resp 16   Ht 5' 8\" (1.727 m)   Wt 205 lb 0.4 oz (93 kg)   SpO2 99%   BMI 31.17 kg/m²   Judd Risk Score: Judd Scale Score: 17    LABS    CBC:   Lab Results   Component Value Date    WBC 10.4 03/30/2022    RBC 3.06 03/30/2022    HGB 9.0 03/30/2022    HCT 29.1 03/30/2022    MCV 95.1 03/30/2022    MCH 29.4 03/30/2022    MCHC 30.9 03/30/2022    RDW 15.7 03/30/2022     03/30/2022    MPV 10.5 03/30/2022     CMP:    Lab Results   Component Value Date     03/30/2022    K 5.3 03/30/2022    K 3.9 02/12/2018    CL 99 03/30/2022    CO2 20 03/30/2022    BUN 54 03/30/2022    CREATININE 2.2 03/30/2022    GFRAA 35 03/30/2022    LABGLOM 29 03/30/2022    GLUCOSE 270 03/30/2022    PROT 6.4 03/30/2022    PROT 7.6 10/27/2012    LABALBU 3.6 03/30/2022    CALCIUM 9.0 03/30/2022    BILITOT 0.2 03/30/2022    ALKPHOS 118 03/30/2022    AST 14 03/30/2022    ALT 13 03/30/2022     Albumin:    Lab Results   Component Value Date    LABALBU 3.6 03/30/2022     PT/INR:    Lab Results   Component Value Date    PROTIME 15.5 03/17/2022    PROTIME 15.2 01/24/2011    INR 1.20 03/17/2022     HgBA1c:    Lab Results   Component Value Date    LABA1C 9.2 03/22/2022         Assessment:     Patient Active Problem List   Diagnosis    Gait disturbance    Generalized weakness    Diabetes mellitus ([address]    Osteoarthritis    Anemia of chronic disorder    Infected implanted bladder sphincter cuff    Escherichia coli urinary tract infection    Hypertension    Sepsis ([address]    Type 2 diabetes mellitus with hypoglycemia without coma ([address]    UTI (urinary tract infection) due to urinary indwelling catheter (HCC)    Hyperkalemia    Acute kidney failure (HCC)    Leg weakness, bilateral    Type 2 diabetes mellitus with neurological manifestations, uncontrolled (HCC)    Complicated UTI (urinary tract infection)    Essential hypertension    Severe muscle deconditioning    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 (HCC)    Frequent falls    Chronic kidney disease, stage 3a ([address]    Uncontrolled type 2 diabetes mellitus with peripheral neuropathy (HCC)    Prostate cancer ([address]    Suprapubic catheter ([address]    Sepsis secondary to UTI ([address]    Coagulase negative Staphylococcus bacteremia    Chronic kidney disease (CKD), stage IV (severe) (HCC)    Acute metabolic encephalopathy    SVT (supraventricular tachycardia) ([address]    Metabolic encephalopathy       Measurements:  Wound 03/22/22 Heel slightly purple round size of dime non open area (Active)   Wound Image   03/31/22 1504   Wound Cleansed Cleansed with saline 03/31/22 1504   Dressing/Treatment Open to air 03/31/22 1504   Wound Length (cm) 1.5 cm 03/31/22 1504   Wound Width (cm) 2 cm 03/31/22 1504   Wound Depth (cm) 0 cm 03/31/22 1504   Wound Surface Area (cm^2) 3 cm^2 03/31/22 1504   Wound Volume (cm^3) 0 cm^3 03/31/22 1504   Distance Tunneling (cm) 0 cm 03/31/22 1504   Tunneling Position ___ O'Clock 0 03/31/22 1504   Undermining Starts ___ O'Clock 0 03/31/22 1504   Undermining Ends___ O'Clock 0 03/31/22 1504   Undermining Maxium Distance (cm) 0 03/31/22 1504   Drainage Amount None 03/31/22 1504   Kimberly-wound Assessment Intact 03/31/22 1504   Margins Attached edges 03/31/22 1504   Number of days: 8       Wound 03/22/22 Groin Left;Right; Inner red and excoriated (Active)   Wound Cleansed Other (Comment) 03/31/22 1504   Dressing/Treatment Interdry Ag/wicking fabric with Ag 03/31/22 1504   Wound Length (cm) 0 cm 03/31/22 1504   Wound Width (cm) 0 cm 03/31/22 1504   Wound Depth (cm) 0 cm 03/31/22 1504   Wound Surface Area (cm^2) 0 cm^2 03/31/22 1504   Wound Volume (cm^3) 0 cm^3 03/31/22 1504   Distance Tunneling (cm) 0 cm 03/31/22 1504   Tunneling Position ___ O'Clock 0 03/31/22 1504   Undermining Starts ___ O'Clock 0 03/31/22 1504   Undermining Ends___ O'Clock 0 03/31/22 1504   Undermining Maxium Distance (cm) 0 03/31/22 1504   Drainage Amount Moderate 03/31/22 1504   Drainage Description Yellow 03/31/22 1504   Odor None 03/31/22 1504   Kimberly-wound Assessment Maceration 03/31/22 1504   Margins Undefined edges 03/31/22 1504   Number of days: 8       Wound 03/22/22 Mid abdominal folds super pubic area redness (Active)   Wound Cleansed Other (Comment) 03/31/22 1504   Dressing/Treatment Interdry Ag/wicking fabric with Ag 03/31/22 1504   Wound Length (cm) 0 cm 03/31/22 1504   Wound Width (cm) 0 cm 03/31/22 1504   Wound Depth (cm) 0 cm 03/31/22 1504   Wound Surface Area (cm^2) 0 cm^2 03/31/22 1504   Wound Volume (cm^3) 0 cm^3 03/31/22 1504 Distance Tunneling (cm) 0 cm 03/31/22 1504   Tunneling Position ___ O'Clock 0 03/31/22 1504   Undermining Starts ___ O'Clock 0 03/31/22 1504   Undermining Ends___ O'Clock 0 03/31/22 1504   Undermining Maxium Distance (cm) 0 03/31/22 1504   Drainage Amount Small 03/31/22 1504   Drainage Description Yellow 03/31/22 1504   Odor None 03/31/22 1504   Kimberly-wound Assessment Maceration 03/31/22 1504   Margins Undefined edges 03/31/22 1504   Number of days: 8       Response to treatment:  Well tolerated by patient. Pain Assessment:  Severity:  none  Quality of pain:   Wound Pain Timing/Severity:   Premedicated: no    Plan:     Plan of Care: Wound 03/22/22 Heel slightly purple round size of dime non open area-Dressing/Treatment: Open to air  Wound 03/22/22 Groin Left;Right; Inner red and excoriated-Dressing/Treatment: Interdry Ag/wicking fabric with Ag  Wound 03/22/22 Mid abdominal folds super pubic area redness-Dressing/Treatment: Interdry Ag/wicking fabric with Ag     Patient in bed agreeable to wound care eval. Pt has dry brown area to left heel possibly resolving pressure area. Cleansed with NS, measured and pictured. Rt heel intact and both heels floated. Buttocks intact pt turned to lt side with pillow support. Abdomen folds/groins/area around suprapubic catheter macerated/pink. Wiped with wipes and applied Interdry Ag. Attempted to apply atmos air pump but there is not connection on mattress. Pt is at mild risk for skin breakdown AEB dave. Follow dave orders. Specialty Bed Required : yes  [] Low Air Loss   [x] Pressure Redistribution  [] Fluid Immersion  [] Bariatric  [] Total Pressure Relief  [] Other:     Discharge Plan:  Placement for patient upon discharge: tbd  Hospice Care: no  Patient appropriate for Outpatient [address]: no    Patient/Caregiver Teaching:  Level of patient/caregiver understanding able to: cares explained as given. No evidence of learning.           Electronically signed by Tammy Silva RN,  on 3/31/2022 at 3:12 PM

## 2022-03-31 NOTE — PROGRESS NOTES
Progress Note( Dr. Haider Hand)  3/31/2022  Subjective:   Admit Date: 3/30/2022  PCP: Sulma Wagner MD    Admitted For : Patient was initially admitted to medical floor for sepsis from UTI and generalized  weakness    Consulted For: Better control of blood glucose    Interval History: Patient was transferred from medical floor on 3/30/2022 and was admitted to acute rehab unit for rehabilitation and physical therapy        Denies any chest pains,   Denies SOB . Denies nausea or vomiting. No new bowel or bladder symptoms.        Intake/Output Summary (Last 24 hours) at 3/31/2022 1939  Last data filed at 3/31/2022 1923  Gross per 24 hour   Intake 1206 ml   Output 850 ml   Net 356 ml       DATA    CBC:   Recent Labs     03/29/22  0726 03/30/22  0153 03/30/22  0928   WBC 11.2*  --  10.4   HGB 7.9* 7.9* 9.0*   *  --  504*    CMP:  Recent Labs     03/29/22  0726 03/30/22  0928    134*   K 5.0 5.3*    99   CO2 21 20*   BUN 53* 54*   CREATININE 2.3* 2.2*   CALCIUM 8.8 9.0   PROT 5.8* 6.4   LABALBU 3.0* 3.6   BILITOT 0.2 0.2   ALKPHOS 111 118   AST 14* 14*   ALT 13 13     Lipids: No results found for: CHOL, HDL, TRIG  Glucose:  Recent Labs     03/31/22  0921 03/31/22  1135 03/31/22  1700   POCGLU 199* 179* 141*     [id number]:  Lab Results   Component Value Date    LABA1C 9.2 03/22/2022     High Sensitivity TSH:   Lab Results   Component Value Date    TSHHS 6.190 03/25/2022     Free T3: No results found for: FT3  Free T4:  Lab Results   Component Value Date    T4FREE 1.15 03/27/2022       No orders to display        Scheduled Medicines   Medications:    insulin glargine  25 Units SubCUTAneous Nightly    heparin (porcine)  5,000 Units SubCUTAneous 3 times per day    sodium chloride flush  5-40 mL IntraVENous 2 times per day    allopurinol  100 mg Oral Daily    alogliptin  6.25 mg Oral Daily    aspirin  81 mg Oral Daily    atorvastatin  40 mg Oral Nightly    carvedilol  3.125 mg Oral BID WC  chlorthalidone  25 mg Oral Daily    glipiZIDE  15 mg Oral BID AC    insulin lispro  0-12 Units SubCUTAneous TID WC    insulin lispro  0-12 Units SubCUTAneous Nightly    levothyroxine  50 mcg Oral Daily    mirtazapine  15 mg Oral Nightly    pregabalin  50 mg Oral BID    doxycycline hyclate  100 mg Oral 2 times per day      Infusions:    dextrose           Objective:   Vitals: BP (!) 150/65   Pulse 80   Temp 98.1 °F (36.7 °C)   Resp 16   Ht 5' 8\" (1.727 m)   Wt 205 lb 0.4 oz (93 kg)   SpO2 99%   BMI 31.17 kg/m²   General appearance: alert and cooperative with exam  Neck: no JVD or bruit  Thyroid : Normal lobes   Lungs: Has Vesicular Breath sounds   Heart:  regular rate and rhythm  Abdomen: soft, non-tender; bowel sounds normal; no masses,  no organomegaly has Surapubic Catheter   Musculoskeletal: Normal  Extremities: extremities normal, , no edema  Neurologic:  Awake, alert, oriented to name, place and time. Cranial nerves II-XII are grossly intact. Motor weakness . Sensory is intact. ,  and gait is abnormal.unstable     Assessment:     Patient Active Problem List:     Gait disturbance     Generalized weakness     Diabetes mellitus (HCC)     Osteoarthritis     Anemia of chronic disorder     Infected implanted bladder sphincter cuff     Escherichia coli urinary tract infection     Hypertension     Sepsis ([address]     Type 2 diabetes mellitus with hypoglycemia without coma ([address]     UTI (urinary tract infection) due to urinary indwelling catheter (HCC)     Hyperkalemia     Acute kidney failure (HCC)     Leg weakness, bilateral     Type 2 diabetes mellitus with neurological manifestations, uncontrolled ([address]     Complicated UTI (urinary tract infection)     Essential hypertension     Severe muscle deconditioning     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 ([address]     Frequent falls     Chronic kidney disease, stage 3a ([address]     Uncontrolled type 2 diabetes mellitus with peripheral neuropathy ([address] Prostate cancer [address]     Suprapubic catheter ([facility]     Sepsis secondary to UTI ([facility]      Plan:     1. Reviewed POC blood glucose . Labs and X ray results   2. Reviewed Current Medicines   3. On meal/ Correction bolus Humalog/ Basal Lantus Insulin regime / and   4. Monitor Blood glucose frequently   5. Modified  the dose of Insulin/ other medicines as needed  6. Ordered ultrasound of thyroid gland other thyroid function  7. On low-dose Synthroid of 50 mg/day  8. Patient was transferred to acute rehab unit on 3/30//2022   9. Patient to be participating in physical therapy and Occupational Therapy of acute rehab unit  10. Will follow     .      Greg George MD, MD

## 2022-03-31 NOTE — PLAN OF CARE
Problem: Skin Integrity:  Goal: Will show no infection signs and symptoms  Description: Will show no infection signs and symptoms  3/31/2022 0846 by Jv Orourke LPN  Outcome: Ongoing  3/30/2022 2203 by Laura Chen  Outcome: Ongoing  Goal: Absence of new skin breakdown  Description: Absence of new skin breakdown  3/31/2022 0846 by Jv Orourke LPN  Outcome: Ongoing  3/30/2022 2203 by Laura Chen  Outcome: Ongoing     Problem: Infection:  Goal: Will remain free from infection  Description: Will remain free from infection  3/31/2022 0846 by Jv Orourke LPN  Outcome: Ongoing  3/30/2022 2203 by Laura Chen  Outcome: Ongoing     Problem: Safety:  Goal: Free from accidental physical injury  Description: Free from accidental physical injury  3/31/2022 0846 by Jv Orourke LPN  Outcome: Ongoing  3/30/2022 2203 by Laura Chen  Outcome: Ongoing  Goal: Free from intentional harm  Description: Free from intentional harm  3/31/2022 0846 by Jv Orourke LPN  Outcome: Ongoing  3/30/2022 2203 by Laura Chen  Outcome: Ongoing     Problem: Daily Care:  Goal: Daily care needs are met  Description: Daily care needs are met  3/31/2022 0846 by Jv Orourke LPN  Outcome: Ongoing  3/30/2022 2203 by Laura Chen  Outcome: Ongoing     Problem: Pain:  Goal: Patient's pain/discomfort is manageable  Description: Patient's pain/discomfort is manageable  3/31/2022 0846 by Jv Orourke LPN  Outcome: Ongoing  3/30/2022 2203 by Laura Chen  Outcome: Ongoing  Goal: Pain level will decrease  Description: Pain level will decrease  3/31/2022 0846 by Jv Orourke LPN  Outcome: Ongoing  3/30/2022 2203 by Laura Chen  Outcome: Ongoing  Goal: Control of acute pain  Description: Control of acute pain  3/31/2022 0846 by Jv Orourke LPN  Outcome: Ongoing  3/30/2022 2203 by Laura Chen  Outcome: Ongoing  Goal: Control of chronic pain  Description: Control of chronic pain  3/31/2022 0846 by Balaji Brown LPN  Outcome: Ongoing  3/30/2022 2203 by Janelle Ghotra  Outcome: Ongoing     Problem: Skin Integrity:  Goal: Skin integrity will stabilize  Description: Skin integrity will stabilize  3/31/2022 0846 by Balaji Brown LPN  Outcome: Ongoing  3/30/2022 2203 by Janelle Ghotra  Outcome: Ongoing     Problem: Discharge Planning:  Goal: Patients continuum of care needs are met  Description: Patients continuum of care needs are met  3/31/2022 0846 by Balaji Brown LPN  Outcome: Ongoing  3/30/2022 2203 by Janelle Ghotra  Outcome: Ongoing     Problem: Mobility - Impaired:  Goal: Mobility will improve  Description: Mobility will improve  3/31/2022 0846 by Balaji Brown LPN  Outcome: Ongoing  3/30/2022 2203 by Janelle Ghotra  Outcome: Ongoing

## 2022-03-31 NOTE — PLAN OF CARE
ARU Interdisciplinary Plan of Care (IPOC)  [facility] Dr. Kylie Overton [facility], [address]  [phone]  Fax: [phone]        Anand Link    : 1940  Acct #: [de-identified]  MRN: [id number]   PHYSICIAN:  Janeth Kelly MD  Primary Active Problems:   Active Hospital Problems    Diagnosis Date Noted    Metabolic encephalopathy [W89.61] 2022       Rehabilitation Diagnosis:     Metabolic encephalopathy [B11.97]  Metabolic encephalopathy [A16.06]          CARE PLAN     NURSING:  Anand Link while on this unit will:      Bowel and Bladder   [x] Be continent of bowel and bladder      [x] Have an adequate number of bowel movements   [] Urinate with no urinary retention >300ml in bladder   [] Bladder Scan: (details)   [] Complete bladder protocol with mccrary removal   [] Initiate Bladder Program to toilet every ___ hours   [] Initiate Bowel Program to toilet every ___hours   [] Bladder training    [] Bowel training  Pulmonary   [x] Maintain O2 SATs at 92% or greater  Pain Management   [x] Have pain managed while on ARU        [] Be pain free by discharge    [x] Medication Management and Education  Maintenance of Skin Integrity/Wound Management   [x] Have no skin breakdown while on ARU   [x] Have improved skin integrity via wound measurements   [x] Have no signs/symptoms of infection via infection protection and monitoring at the          wound site  Fall Prevention   [x] Be free from injury during hospitalization via fall prevention measures     [x] Disease management and Education  Precautions   [] Weight Bearing Precautions   [] Swallowing Precautions   [] Monitoring of Risks of Complications   [x] DVT Prophylaxis    [x] Fluid/electrolyte/Nutrition Management    [x] Complete education with patient/family with understanding demonstrated for          in-room safety with transfers to bed, toilet, wheelchair, shower as well as bathroom activities and hygiene. [] Adjustment   [] Other:   Nursing interventions may include bowel/bladder training, education for medical assistive devices, medication education, O2 saturation management, energy conservation, stress management techniques, fall prevention, alarms protocol, seating and positioning, skin/wound care, pressure relief instruction,dressing changes,  infection protection, DVT prophylaxis, and/or assistance with in room safety with transfers to bed, toilet, wheelchair, shower as well as bathroom activities and hygiene. Patient/caregiver education for:   [x] Disease/sustained injury/management      [x] Medication Use   [] Surgical intervention   [x] Safety/Precautions   [x] Body mechanics and or joint protection   [x] Health maintenance         PHYSICAL THERAPY:  Goals:                               Long term goals  Time Frame for Long term goals : 12-14 days  Long term goal 1: Pt will perform bed mobility with mod I  Long term goal 2: Pt will perform sit to stand and pivot transfers with mod I, car transfers with CGA  Long term goal 3: Pt will ambulate 10' with 2ww with mod I, up to 150' with supervision including uneven surfaces  Long term goal 4: Pt will ascend/descend curb step with 2ww and up to 4 steps with B rails with supervision  Long term goal 5: Pt will  object from floor with 2ww and reacher with supervision  These goals were reviewed with this patient at the time of assessment and Alexander Ortiz is in agreement. Plan of Care: Pt to be seen 5 days per week for a minimum of 60 minutes for 12 days.                 Current Treatment Recommendations: Strengthening,ROM,Balance Training,Functional Mobility Training,Transfer Training,ADL/Self-care Training,Gait Training,Patient/Caregiver Education & Training,IADL Training,Stair training,Equipment Evaluation, Education, & procurement,Neuromuscular Re-education,Pain Management,Home Exercise Program,Positioning,Endurance Training,Safety Education & Training,Cognitive/Perceptual Training,Cognitive Reorientation community reintegration,animal assisted therapy, and concurrent/group therapy. PT IRF-LESTER scores and goals for initial assessment:   Bed Mobility:   Sit to Lying  Assistance Needed: Substantial/maximal assistance  Comment: assist with BLE and trunk  CARE Score: 2  Discharge Goal: Independent  Roll Left and Right  Assistance Needed: Substantial/maximal assistance  Comment: max B, no rails, but HOB slightly elevated due to back pain. CARE Score: 2  Discharge Goal: Independent  Lying to Sitting on Side of Bed  Assistance Needed: Substantial/maximal assistance  Comment: assist for BLE and trunk  CARE Score: 2  Discharge Goal: Independent    Transfers:    Sit to Stand  Comment: Pt unable to keep balance in sitting, becoming less alert. returned to supine. feel pt may be able to perform next session  Reason if not Attempted: Not attempted due to medical condition or safety concerns  CARE Score: 88  Discharge Goal: Independent  Chair/Bed-to-Chair Transfer  Comment: feel pt may be able to perform next session  Reason if not Attempted: Not attempted due to medical condition or safety concerns  CARE Score: 88  Discharge Goal: Independent     Car Transfer  Comment: feel pt may be able to attempt next session  Reason if not Attempted: Not attempted due to medical condition or safety concerns  CARE Score: 88  Discharge Goal: Supervision or touching assistance    Ambulation:    Walking Ability  Does the Patient Walk?: No     Walk 10 Feet  Comment: pt not able to remain alert to attempt.  do not feel pt will be able to perform in next session  Reason if not Attempted: Not attempted due to medical condition or safety concerns  CARE Score: 88  Discharge Goal: Independent     Walk 50 Feet with Two Turns  Reason if not Attempted: Not attempted due to medical condition or safety concerns  CARE Score: 88  Discharge Goal: Supervision or touching assistance     Walk 150 Feet  Reason if not Attempted: Not attempted due to medical condition or safety concerns  CARE Score: 88  Discharge Goal: Supervision or touching assistance     Walking 10 Feet on Uneven Surfaces  Reason if not Attempted: Not attempted due to medical condition or safety concerns  CARE Score: 88  Discharge Goal: Supervision or touching assistance     1 Step (Curb)  Reason if not Attempted: Not attempted due to medical condition or safety concerns  CARE Score: 88  Discharge Goal: Supervision or touching assistance     4 Steps  Reason if not Attempted: Not attempted due to medical condition or safety concerns  CARE Score: 88  Discharge Goal: Supervision or touching assistance     12 Steps  Reason if not Attempted: Not applicable  CARE Score: 9  Discharge Goal: Not Applicable    Gait Deviations: []None []Slow héctor  [] Increased TYSHAWN  [] Staggers []Deviated Path  [] Decreased step length  [] Decreased step height  []Decreased arm swing  [] Shuffles  [] Decreased head and trunk rotation  []other:        Wheelchair:  w/c Ability: Wheelchair Ability  Uses a Wheelchair and/or Scooter?: No                Balance:        Object: Picking Up Object  Reason if not Attempted: Not attempted due to medical condition or safety concerns  CARE Score: 88  Discharge Goal: Supervision or touching assistance  OCCUPATIONAL THERAPY:  Goals:             Short term goals  Time Frame for Short term goals: STGs=LTGs :  Long term goals  Time Frame for Long term goals : 10-14 days or until d/c  Long term goal 1: Pt will complete grooming tasks Ind seated  Long term goal 2: Pt will complete total body bathing c S  Long term goal 3: Pt will complete UB dressing c setup  Long term goal 4: Pt will complete LB dressing c supervision using AE PRN  Long term goal 5: Pt will doff/don footwear c supervision using AE PRN  Long term goals 6: Pt will complete toileting c S  Long term goal 7: Pt will complete functional transfers (bed, chair, toilet, shower) c DME PRN and S  Long term goal 8: Pt will perform therex/therax to facilitate increased strength/endurance/ax tolerance (c emphasis on dynamic standing balance >8 mins, BUE endurance, cognitive retraining) c SBA :    These goals were reviewed with this patient at the time of assessment and Fatemeh Whiting is in agreement    Plan of Care:  Pt to be seen 5 days per week for a minimum of 60 minutes for 12 days. Plan  Times per day: Daily  Current Treatment Recommendations: Nathan Billings Mobility Training,Endurance Training,Pain Management,Safety Education & Training,Patient/Caregiver Education & Training,Equipment Evaluation, Education, & procurement,Self-Care / ADL,Home Management Training,Cognitive/Perceptual Training         cognitive training, home management, energy conservation training, community reintegration, splint fabrication, patient/caregiver education and training, animal assisted therapy, and concurrent and/or group therapy.       OT IRF-LESTER scores and goals for initial assessment:    ADLs:    Eating: Eating  Assistance Needed: Setup or clean-up assistance  Comment: assist to open packages/containers  CARE Score: 5  Discharge Goal: Independent       Oral Hygiene: Oral Hygiene  Assistance Needed: Supervision or touching assistance  Comment: seated, increased time for object manipulation  CARE Score: 4  Discharge Goal: Independent    UB/LB Bathing: Shower/Bathe Self  Assistance Needed: Partial/moderate assistance  Comment: required assist to bathe bottom and distal BLEs  CARE Score: 3  Discharge Goal: Supervision or touching assistance    UB Dressing: Upper Body Dressing  Assistance Needed: Partial/moderate assistance  Comment: able to thread BUEs, required assist to pull overhead, then able to pull down in front/back  CARE Score: 3  Discharge Goal: Set-up or clean-up assistance         LB Dressing: Lower Body Dressing  Assistance Needed: Dependent  Comment: poor distal reach and BLE strength impacting performance, required total assist to thread BLEs and catheter in brief and pants, perform pants management up  CARE Score: 1  Discharge Goal: Supervision or touching assistance    Donning and [employer] Footwear: Putting On/Taking Off Footwear  Assistance Needed: Dependent  Comment: poor distal reach and strength, required total A to doff/don hospital socks  CARE Score: 1  Discharge Goal: Supervision or touching assistance      Toileting: Toileting Hygiene  Assistance Needed: Dependent  Comment: 2 person assist for balance, pants management/bowel hygiene  CARE Score: 1  Discharge Goal: Supervision or touching assistance      Toilet Transfers: Toilet Transfer  Assistance Needed: Dependent  Comment: performed x2 during OT eval; ranged from mod-max A x2 with fatigue. Required significant cues for body mechanics and hand placement  CARE Score: 1  Discharge Goal: Supervision or touching assistance      SPEECH THERAPY: (If ordered)  Plan of Care and Goals:   LTG       Duration/Frequency of Treatment  Duration/Frequency of Treatment: 1x/week x 1 week  30 mins min                       Short-term Goals  Timeframe for Short-term Goals: 1x/week x1 week 30 mins min--continue to assess for cognition when alert  Goal 1: Complete cognitive assessment when able to remain alert and establish goals as appropriate                      Timeframe for Short-term Goals: 1x/week x1 week 30 mins min--continue to assess for cognition when alert     LTG:                           Treatments may include speech/language/communication therapy, cognitive training, animal assisted therapy, group therapy, education, and/or dysphagia therapy based on the above goals. Co-treats where appropriate with PT or OT to facilitate patient goals in functional tasks.         These goals were reviewed with this patient at the time of assessment and Edgar morales in agreement. CASE MANAGEMENT:  Goals:   Assist patient/family with discharge planning, patient/family counseling, assistance in obtaining recommended equipment and other services, and coordination with insurance during ARU stay. Patient Goals: Return to maximum level of independent function. Nutrition goal: Patient will consume at least 75% of meals during stay       Activities Prior to Admit:   Homemaking Responsibilities: Yes  Active : No  Mode of Transportation: Car,SUV  Occupation: Retired  Leisure & Hobbies: TV, very little reading, no pets, doctors, son manages Richar Dinorah are managed via pill dispenser that son manages. Pt occasionally writes checks and some auto pay         Intensity of Therapy  Dedrick Smith will be seen a minimum of 3 hours of therapy per day/a minimum of 5 out of 7 days per week. [] In this rare instance due to the nature of this patient's medical involvement, this patient will be seen 15 hours per week (900 minutes within a 7 day period). Treatments may include therapeutic exercises, gait training, neuromuscular re-ed, transfer training, community reintegration, bed mobility, w/c mobility and training, self care, home mgmt, cognitive training, energy conservation,dysphagia tx, speech/language/communication therapy, group therapy, and patient/family education. In addition, dietician/nutritionist may monitor calorie count as well as intake and collaboratively work with SLP on dietary upgrades. Neuropsychology/Psychology may evaluate and provide necessary support. Group therapy as appropriate to facilitate improved endurance, STR, COORD, function, safety, transfers, awareness and insight into deficits, problem solving, memory, and social interaction and engagement.     Medical issues being managed closely and that require 24 hour availability of a physician:   [x] Swallowing Precautions                                     [] Weight bearing precautions   [x] Wound Care                             [x] Infection Prevention   [x] DVT Prophylaxis/assessment              [x] Monitoring for complications    [x] Fall Precautions/Prevention                         [x] Fluid/Electrolyte/Nutrition Balance   [x] Voice Protection                           [x] Medication Management   [x] Respiratory                   [x] Pain Mgmt   [x] Bowel/Bladder Fx    Medical Prognosis: [] Good  [x] Fair    [] Guarded   Total expected IRF days 18                                            Physician anticipated functional outcomes:  FWW nad [facility] RN/OT/PT and supervision. Rehab Goals:   [] Return to premorbid function of_______________________________.    [] Independent   [] Mod I  [x] Supervision  [] CGA   [] Min A   [] Mod A  Level for ambulation []without assistive device  [x] with assistive device        [] Independent   [] Mod I  [x] Supervision [] CGA   [] Min A   [] Mod A  Level for transfers []without assistive device  [x] with assistive device         [] Independent   [] Mod I  [x] Supervision [] CGA   [] Min A   [] Mod A Level with ADL's []without assistive device   [x] with assistive device     ___________________     Level with cognitive skills requiring [] No assist [x] Supervision  [] Active Assist/Cues     [] Maximize level of mobility and ADL's to decrease burden on caregiver    IPOC brief synthesis of Preadmission Screen, Post-Admission Evaluation, and Therapy Evaluations:  Acute inpatient rehabilitation with occupational and physical therapy 180 minutes 5 out of every 7 days. Will address basic and  advancing mobility with self-care instruction and adaptive equipment training. Caregiver education will be offered. Expected length of stay  prior to a supervised level of function for discharge home with a walker and [facility] OT/PT is 18 days.     Additional recommendation:     1.  Metabolic encephalopathy with gait disturbance: Patient requires daily occupational and physical therapy with speech-language pathology. He was treated with a calm and consistent environment to help retain information. Trying to regulate his sleep schedule to improve daytime alertness. Minimizing medications that might sedate him. He needs adaptive equipment training, caregiver education and aggressive pulmonary hygiene measures. Nutritional support. Bowel bladder retraining. DVT prophylaxis. 2. DVT prophylaxis: Heparin 5000 units every 8 hours. I must monitor his hemoglobin and platelet count periodically while on this medication. Weightbearing activities will be pursued daily. GI prophylaxis offered. 3. Urinary tract infection: Is being transitioned from IV antibiotics to Vibramycin for another week. Encouraging oral hydration. Changing his suprapubic catheter monthly. Outpatient follow-up with urology. 4. Hypertension: Coreg and [place] for blood pressure regulation. Target systolic blood pressure is 120-140. Vital signs are checked at rest and with activity. 5. 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 Scheduled Humalog and Lantus. Hold Nesina. Lyrica for the neuropathy symptoms. Blood sugars are checked at mealtime and bedtime. 6. Prostate cancer: Continue with the suprapubic catheter with changes monthly. Encouraging oral hydration. Outpatient follow-up with urology. 7. SVT: Cautious thyroid replacement. Coreg for rate control. Vital signs are checked at rest and with activity. Daily weights to detect any decompensation associated with CHF.     Anticipated discharge destination:    [] Home Independently   [x] Home with supervision    []SNF     [] Other       This plan has been reviewed with me in a language I understand.  I have had the opportunity to include my input with my therapy team.    ________________________________________________   ______________________  Patient/Significant Other      Date    I have reviewed this initial plan of care and agree with its contents:    Title   Name    Date    Time    Physician: Zenaida Henley MD 4/1/2022 11:47 AM    Case Mgmt: Liz Dela Cruz MSW, LSW 03/31/22 1530    OT:  Eden Anderson MS, OTR/L 03/31/2022 1556    PT: Rupa Phillips, PT 3/31/22 1153    RN: Dejan Abebe RN    ST: [facility].  [place], [place], [address] [place] [address] 3/31/2022  11:24 AM'    Dietician: Ji Finney, ILEANA  03/31/2022 15:14     ADMIT DATE:3/30/2022

## 2022-03-31 NOTE — PROGRESS NOTES
Wound care attempted to eval patient was working with therapy at this time. Electronically signed by Lex Ewing.  ISHMAEL Silva on 3/31/2022 at 11:00 AM

## 2022-03-31 NOTE — PROGRESS NOTES
Progress Note( Dr. Pato Villalobos)  3/30/2022  Subjective:   Admit Date: 3/16/2022  PCP: Aaliyah Encarnacion MD    Admitted For :Sepsis from UTI generalized  weakness    Consulted For: Better control of blood glucose    Interval History: feels better  Still feels weak  Lab review the patient has mild hypothyroidism elevated TSH level    Pt Hb/Hct is now around on 9  GI was consulted for ppossile GI bleeding     Denies any chest pains,   Denies SOB . Denies nausea or vomiting. No new bowel or bladder symptoms. Intake/Output Summary (Last 24 hours) at 3/30/2022 2126  Last data filed at 3/30/2022 1826  Gross per 24 hour   Intake --   Output 2150 ml   Net -2150 ml       DATA    CBC:   Recent Labs     03/28/22  0020 03/28/22  0020 03/29/22  0726 03/30/22  0153 03/30/22  0928   WBC 9.4  --  11.2*  --  10.4   HGB 8.1*   < > 7.9* 7.9* 9.0*     --  488*  --  504*    < > = values in this interval not displayed. CMP:  Recent Labs     03/28/22  0020 03/29/22  0726 03/30/22  0928     135 137 134*   K 4.9  4.9 5.0 5.3*     100 103 99   CO2 22  22 21 20*   BUN 51*  49* 53* 54*   CREATININE 2.1*  2.1* 2.3* 2.2*   CALCIUM 8.7  8.6 8.8 9.0   PROT  --  5.8* 6.4   LABALBU 3.1* 3.0* 3.6   BILITOT  --  0.2 0.2   ALKPHOS  --  111 118   AST  --  14* 14*   ALT  --  13 13     Lipids: No results found for: CHOL, HDL, TRIG  Glucose:  Recent Labs     03/30/22  1106 03/30/22  1610 03/30/22 2102   POCGLU 294* 208* 357*     [id number]:  Lab Results   Component Value Date    LABA1C 9.2 03/22/2022     High Sensitivity TSH:   Lab Results   Component Value Date    TSHHS 6.190 03/25/2022     Free T3: No results found for: FT3  Free T4:  Lab Results   Component Value Date    T4FREE 1.15 03/27/2022       US HEAD NECK SOFT TISSUE THYROID   Final Result   Mild changes of chronic thyroiditis. CT HEAD WO CONTRAST   Final Result   1. No acute intracranial abnormality.    2. Stable moderate chronic white matter microvascular ischemic changes and   chronic lacunar infarct in the right basal ganglia. VL DUP LOWER EXTREMITY VENOUS BILATERAL   Final Result   No evidence of DVT in either lower extremity. XR HIP 2-3 VW W PELVIS LEFT   Final Result   No acute abnormality or arthropathy of the hip. CT ABDOMEN PELVIS WO CONTRAST Additional Contrast? None   Final Result   1. Well-positioned left ureteric stent with no evidence of hydronephrosis,   renal abscess or other acute abnormality. Normal right kidney. 2. Minimal atelectatic changes in both lower lobes. 3. Well-positioned suprapubic catheter in the urinary bladder. FL LESS THAN 1 HOUR   Final Result      CT ABDOMEN PELVIS WO CONTRAST Additional Contrast? None   Final Result   1. There is left-sided hydronephrosis and hydroureter despite the presence   of the left ureteral stent. Increased left perinephric stranding and fluid   compared to the right side. Will need to correlate for functionality of the   stent versus ascending urinary tract infection. 2.  The urinary bladder is collapsed around the suprapubic catheter. The   distal coil of the ureteral stent courses into the urinary bladder along the   inferior aspect or an expanded upper portion of the urethral junction. Previous prostate surgery is again noted. 3.  Constipation in the ascending and transverse colon. No small bowel   obstruction or pneumoperitoneum. 4.  Mild thickening of the distal esophagus may relate to reflux esophagitis. CT HEAD WO CONTRAST   Final Result   No acute intracranial abnormality. MRI may be obtained if clinically   indicated. XR CHEST PORTABLE   Final Result   1. Worsening pulmonary edema.               Scheduled Medicines   Medications:    polyethylene glycol  17 g Oral Once    chlorthalidone  25 mg Oral Daily    glipiZIDE  15 mg Oral BID AC    doxycycline hyclate  100 mg Oral 2 times per day    alogliptin  6.25 mg Oral Daily    insulin glargine  20 Units SubCUTAneous Nightly    insulin lispro  0-12 Units SubCUTAneous Nightly    carvedilol  3.125 mg Oral BID WC    levothyroxine  50 mcg Oral Daily    insulin lispro  0-12 Units SubCUTAneous TID WC    mirtazapine  15 mg Oral Nightly    aspirin  81 mg Oral Daily    cephALEXin  500 mg Oral 4 times per day    atorvastatin  40 mg Oral Nightly    polyethylene glycol  17 g Oral Daily    sodium chloride flush  5-40 mL IntraVENous 2 times per day    heparin (porcine)  5,000 Units SubCUTAneous 3 times per day    allopurinol  100 mg Oral Daily    pregabalin  50 mg Oral BID      Infusions:    dextrose Stopped (03/30/22 1301)    sodium chloride Stopped (03/30/22 1301)         Objective:   Vitals: /73   Pulse 86   Temp 98.1 °F (36.7 °C) (Oral)   Resp 14   Ht 6' 0.01\" (1.829 m)   Wt 201 lb (91.2 kg)   SpO2 100%   BMI 27.25 kg/m²   General appearance: alert and cooperative with exam  Neck: no JVD or bruit  Thyroid : Normal lobes   Lungs: Has Vesicular Breath sounds   Heart:  regular rate and rhythm  Abdomen: soft, non-tender; bowel sounds normal; no masses,  no organomegaly has Surapubic Catheter   Musculoskeletal: Normal  Extremities: extremities normal, , no edema  Neurologic:  Awake, alert, oriented to name, place and time. Cranial nerves II-XII are grossly intact. Motor weakness . Sensory is intact. ,  and gait is abnormal.unstable     Assessment:     Patient Active Problem List:     Gait disturbance     Generalized weakness     Diabetes mellitus (HCC)     Osteoarthritis     Anemia of chronic disorder     Infected implanted bladder sphincter cuff     Escherichia coli urinary tract infection     Hypertension     Sepsis ([address]     Type 2 diabetes mellitus with hypoglycemia without coma ([address]     UTI (urinary tract infection) due to urinary indwelling catheter (HCC)     Hyperkalemia     Acute kidney failure (HCC)     Leg weakness, bilateral     Type 2 diabetes mellitus with neurological manifestations, uncontrolled ([facility]     Complicated UTI (urinary tract infection)     Essential hypertension     Severe muscle deconditioning     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 (HCC)     Frequent falls     Chronic kidney disease, stage 3a ([facility]     Uncontrolled type 2 diabetes mellitus with peripheral neuropathy ([facility]     Prostate cancer ([facility]     Suprapubic catheter ([facility]     Sepsis secondary to UTI ([facility]      Plan:     1. Reviewed POC blood glucose . Labs and X ray results   2. Reviewed Current Medicines   3. On meal/ Correction bolus Humalog/ Basal Lantus Insulin regime / and   4. Monitor Blood glucose frequently   5. Modified  the dose of Insulin/ other medicines as needed  6. Ordered ultrasound of thyroid gland other thyroid function  7. On low-dose Synthroid of 50 mg/day  8. Waiting for insurance approval for transfer to skilled nursing unit  9. Will follow     .      Ursula Metcalf MD, MD

## 2022-03-31 NOTE — PROGRESS NOTES
BG goal 140 - 180     Diet Clear liquid (no sugar)/Bariatric Fluid - 1500mL; Thin  6 Days Post-Op    BG stable while on current SQ insulin regimen.  Endo will continue to follow, and manage glycemic control while inpatient.     - Continue Low Dose SQ Insulin Correction Scale PRN hyperglycemia.  - BG Monitoring AC/HS.     ** Please call Endocrine for any BG related issues **  ** Please notify Endocrine for any change and/or advance in diet**     Lab Results   Component Value Date    HGBA1C 6.0 (H) 01/03/2022       Discharge Planning:   TBD. Please notify endocrinology prior to discharge. Will most likely have patient continue previous home regimen of metformin 500 mg daily, and follow-up with PCP as needed for continued DM management and care.          prior to admission. Chest x-ray reported as worsening pulmonary edema. CT abdomen/pelvis-ordered in ER-left-sided hydronephrosis and hydroureter despite the presence of left ureteral stent, increased left perinephric stranding and fluid compared to the right. Patient dx with Encephalopathy 2/2 Sepsis d/t UTI. Urology was consulted and patient underwent a cystoscopy on 3/17. Patient was on ARU in Nov '21, made good progress and was able to discharge home. Patient lives with son and is IND with ADL's and Ayse with RW for mobility. Currently patient is mod-maxA with ADL's and modA with RW for mobility. COVID negative test noted on 3/17/22 & 3/30/22. Medical/Surgical History   Acute urinary tract infection 3/15/2012  Ataxia 2010  Diabetes mellitus ([facility] 2011   type 2, controlled  Fusion of spine of cervical region 10/2012  Gait disturbance 2011  History of prostate cancer 1996   adenocarcinoma  History of tobacco use 1959  Hyperlipidemia 2011  Osteoarthritis     Cognitive Diagnosis: Ongoing assessment due to fatigue  Communication Diagnosis: WFL        Recommendations:  Requires SLP Intervention: Yes  Duration/Frequency of Treatment: 1x/week x 1 week  30 mins min  D/C Recommendations: To be determined       Plan:   Goals:  Short-term Goals  Timeframe for Short-term Goals: 1x/week x1 week 30 mins min--continue to assess for cognition when alert  Goal 1: Complete cognitive assessment when able to remain alert and establish goals as appropriate   Patient/family involved in developing goals and treatment plan: pt in agreement    Subjective:   Previous level of function and limitations: Fred Epps was independent with activities of daily living prior to admit.   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 Pt occasionally writes checks and some auto pay  Vision  Vision: Impaired  Vision Exceptions: Wears glasses at all times  Hearing  Hearing Exceptions: Hard of hearing/hearing concerns           Objective:  MS APHASIA     Oral/Motor  Oral Motor: Within functional limits    Auditory Comprehension  Comprehension: Within Functional Limits         Expression  Primary Mode of Expression: Verbal    Verbal Expression  Verbal Expression: Within functional limits    Written Expression  Dominant Hand: Right  Written Expression: Within Functional Limits    Motor Speech  Motor Speech: Within Functional Limits         Cognition:      Orientation  Overall Orientation Status: Within Functional Limits  Attention  Attention: Exceptions to [facility] (Unable to remain alert)  Memory  Memory: Exceptions to [facility]  Short-term Memory:  (Ongoing assessment)    Additional Assessments: BIMS  Understanding Verbal and Non-Verbal Content: Understands  Expression of Ideas and Wants: Without difficulty  Cognitive Pattern Assessment Used: BIMS  BIMS Summary Score: 11 out of 15 cognitive score               Prognosis:  Speech Therapy Prognosis  Prognosis: Fair  Prognosis Considerations: Previous Level of Function  Additional Comments: very lethargic  Individuals consulted  Consulted and agree with results and recommendations: Patient    Education:  Patient Education: results, recommendations  Patient Education Response: Needs reinforcement     Type of devices:  All fall risk precautions in place    Therapy Time:   Individual Concurrent Group Co-treatment   Time In 1100         Time Out 1122         Minutes 22  - 8 unable to remain alert to complete                 Kaleb Chavez MA CCC-SLP  3/31/2022 11:22 AM

## 2022-04-01 LAB
ALBUMIN SERPL-MCNC: 3.1 GM/DL (ref 3.4–5)
ALP BLD-CCNC: 97 IU/L (ref 40–128)
ALT SERPL-CCNC: 11 U/L (ref 10–40)
ANION GAP SERPL CALCULATED.3IONS-SCNC: 12 MMOL/L (ref 4–16)
AST SERPL-CCNC: 14 IU/L (ref 15–37)
BASOPHILS ABSOLUTE: 0.1 K/CU MM
BASOPHILS RELATIVE PERCENT: 1.1 % (ref 0–1)
BILIRUB SERPL-MCNC: 0.2 MG/DL (ref 0–1)
BUN BLDV-MCNC: 51 MG/DL (ref 6–23)
CALCIUM SERPL-MCNC: 9.1 MG/DL (ref 8.3–10.6)
CHLORIDE BLD-SCNC: 105 MMOL/L (ref 99–110)
CO2: 21 MMOL/L (ref 21–32)
CREAT SERPL-MCNC: 2.1 MG/DL (ref 0.9–1.3)
DIFFERENTIAL TYPE: ABNORMAL
EOSINOPHILS ABSOLUTE: 0.8 K/CU MM
EOSINOPHILS RELATIVE PERCENT: 9.4 % (ref 0–3)
GFR AFRICAN AMERICAN: 37 ML/MIN/1.73M2
GFR NON-AFRICAN AMERICAN: 30 ML/MIN/1.73M2
GLUCOSE BLD-MCNC: 147 MG/DL (ref 70–99)
GLUCOSE BLD-MCNC: 187 MG/DL (ref 70–99)
GLUCOSE BLD-MCNC: 259 MG/DL (ref 70–99)
GLUCOSE BLD-MCNC: 49 MG/DL (ref 70–99)
GLUCOSE BLD-MCNC: 64 MG/DL (ref 70–99)
GLUCOSE BLD-MCNC: 89 MG/DL (ref 70–99)
HCT VFR BLD CALC: 25.4 % (ref 42–52)
HEMOGLOBIN: 7.7 GM/DL (ref 13.5–18)
IMMATURE NEUTROPHIL %: 0.5 % (ref 0–0.43)
LYMPHOCYTES ABSOLUTE: 2.3 K/CU MM
LYMPHOCYTES RELATIVE PERCENT: 27.2 % (ref 24–44)
MAGNESIUM: 2 MG/DL (ref 1.8–2.4)
MCH RBC QN AUTO: 29.7 PG (ref 27–31)
MCHC RBC AUTO-ENTMCNC: 30.3 % (ref 32–36)
MCV RBC AUTO: 98.1 FL (ref 78–100)
MONOCYTES ABSOLUTE: 0.8 K/CU MM
MONOCYTES RELATIVE PERCENT: 9.3 % (ref 0–4)
NUCLEATED RBC %: 0 %
PDW BLD-RTO: 15.8 % (ref 11.7–14.9)
PHOSPHORUS: 5.1 MG/DL (ref 2.5–4.9)
PLATELET # BLD: 443 K/CU MM (ref 140–440)
PMV BLD AUTO: 10.4 FL (ref 7.5–11.1)
POTASSIUM SERPL-SCNC: 4.9 MMOL/L (ref 3.5–5.1)
RBC # BLD: 2.59 M/CU MM (ref 4.6–6.2)
SEGMENTED NEUTROPHILS ABSOLUTE COUNT: 4.4 K/CU MM
SEGMENTED NEUTROPHILS RELATIVE PERCENT: 52.5 % (ref 36–66)
SODIUM BLD-SCNC: 138 MMOL/L (ref 135–145)
TOTAL IMMATURE NEUTOROPHIL: 0.04 K/CU MM
TOTAL NUCLEATED RBC: 0 K/CU MM
TOTAL PROTEIN: 5.7 GM/DL (ref 6.4–8.2)
WBC # BLD: 8.4 K/CU MM (ref 4–10.5)

## 2022-04-01 PROCEDURE — 6370000000 HC RX 637 (ALT 250 FOR IP): Performed by: PHYSICAL MEDICINE & REHABILITATION

## 2022-04-01 PROCEDURE — 6360000002 HC RX W HCPCS: Performed by: INTERNAL MEDICINE

## 2022-04-01 PROCEDURE — 84100 ASSAY OF PHOSPHORUS: CPT

## 2022-04-01 PROCEDURE — 97535 SELF CARE MNGMENT TRAINING: CPT

## 2022-04-01 PROCEDURE — 85025 COMPLETE CBC W/AUTO DIFF WBC: CPT

## 2022-04-01 PROCEDURE — 6370000000 HC RX 637 (ALT 250 FOR IP): Performed by: INTERNAL MEDICINE

## 2022-04-01 PROCEDURE — 97530 THERAPEUTIC ACTIVITIES: CPT

## 2022-04-01 PROCEDURE — 94150 VITAL CAPACITY TEST: CPT

## 2022-04-01 PROCEDURE — 36415 COLL VENOUS BLD VENIPUNCTURE: CPT

## 2022-04-01 PROCEDURE — 97110 THERAPEUTIC EXERCISES: CPT

## 2022-04-01 PROCEDURE — 80053 COMPREHEN METABOLIC PANEL: CPT

## 2022-04-01 PROCEDURE — 2580000003 HC RX 258: Performed by: INTERNAL MEDICINE

## 2022-04-01 PROCEDURE — 97130 THER IVNTJ EA ADDL 15 MIN: CPT

## 2022-04-01 PROCEDURE — 83735 ASSAY OF MAGNESIUM: CPT

## 2022-04-01 PROCEDURE — 94761 N-INVAS EAR/PLS OXIMETRY MLT: CPT

## 2022-04-01 PROCEDURE — 82962 GLUCOSE BLOOD TEST: CPT

## 2022-04-01 PROCEDURE — 97116 GAIT TRAINING THERAPY: CPT

## 2022-04-01 PROCEDURE — 1280000000 HC REHAB R&B

## 2022-04-01 PROCEDURE — 97129 THER IVNTJ 1ST 15 MIN: CPT

## 2022-04-01 RX ORDER — INSULIN GLARGINE 100 [IU]/ML
15 INJECTION, SOLUTION SUBCUTANEOUS NIGHTLY
Status: DISCONTINUED | OUTPATIENT
Start: 2022-04-01 | End: 2022-04-01

## 2022-04-01 RX ORDER — INSULIN GLARGINE 100 [IU]/ML
10 INJECTION, SOLUTION SUBCUTANEOUS NIGHTLY
Status: DISCONTINUED | OUTPATIENT
Start: 2022-04-01 | End: 2022-04-10

## 2022-04-01 RX ORDER — GLIPIZIDE 5 MG/1
10 TABLET ORAL
Status: DISCONTINUED | OUTPATIENT
Start: 2022-04-01 | End: 2022-04-05

## 2022-04-01 RX ADMIN — ASPIRIN 81 MG 81 MG: 81 TABLET ORAL at 09:14

## 2022-04-01 RX ADMIN — HEPARIN SODIUM 5000 UNITS: 5000 INJECTION INTRAVENOUS; SUBCUTANEOUS at 21:17

## 2022-04-01 RX ADMIN — ALLOPURINOL 100 MG: 100 TABLET ORAL at 09:14

## 2022-04-01 RX ADMIN — SODIUM CHLORIDE, PRESERVATIVE FREE 10 ML: 5 INJECTION INTRAVENOUS at 21:17

## 2022-04-01 RX ADMIN — SODIUM CHLORIDE, PRESERVATIVE FREE 10 ML: 5 INJECTION INTRAVENOUS at 09:18

## 2022-04-01 RX ADMIN — MIRTAZAPINE 15 MG: 15 TABLET, ORALLY DISINTEGRATING ORAL at 21:17

## 2022-04-01 RX ADMIN — DOXYCYCLINE HYCLATE 100 MG: 100 TABLET, FILM COATED ORAL at 09:15

## 2022-04-01 RX ADMIN — HEPARIN SODIUM 5000 UNITS: 5000 INJECTION INTRAVENOUS; SUBCUTANEOUS at 14:50

## 2022-04-01 RX ADMIN — HEPARIN SODIUM 5000 UNITS: 5000 INJECTION INTRAVENOUS; SUBCUTANEOUS at 06:05

## 2022-04-01 RX ADMIN — CHLORTHALIDONE 25 MG: 25 TABLET ORAL at 09:14

## 2022-04-01 RX ADMIN — DOXYCYCLINE HYCLATE 100 MG: 100 TABLET, FILM COATED ORAL at 21:17

## 2022-04-01 RX ADMIN — ATORVASTATIN CALCIUM 40 MG: 40 TABLET, FILM COATED ORAL at 21:17

## 2022-04-01 RX ADMIN — ACETAMINOPHEN 650 MG: 325 TABLET ORAL at 06:05

## 2022-04-01 RX ADMIN — CARVEDILOL 3.12 MG: 6.25 TABLET, FILM COATED ORAL at 09:15

## 2022-04-01 RX ADMIN — CARVEDILOL 3.12 MG: 6.25 TABLET, FILM COATED ORAL at 17:19

## 2022-04-01 RX ADMIN — LEVOTHYROXINE SODIUM 50 MCG: 0.05 TABLET ORAL at 06:05

## 2022-04-01 RX ADMIN — INSULIN LISPRO 6 UNITS: 100 INJECTION, SOLUTION INTRAVENOUS; SUBCUTANEOUS at 17:19

## 2022-04-01 RX ADMIN — PREGABALIN 50 MG: 50 CAPSULE ORAL at 09:15

## 2022-04-01 RX ADMIN — GLIPIZIDE 10 MG: 5 TABLET ORAL at 17:18

## 2022-04-01 RX ADMIN — PREGABALIN 50 MG: 50 CAPSULE ORAL at 21:17

## 2022-04-01 ASSESSMENT — PAIN DESCRIPTION - ORIENTATION: ORIENTATION: MID

## 2022-04-01 ASSESSMENT — PAIN DESCRIPTION - LOCATION: LOCATION: COCCYX

## 2022-04-01 ASSESSMENT — PAIN DESCRIPTION - ONSET: ONSET: GRADUAL

## 2022-04-01 ASSESSMENT — PAIN DESCRIPTION - PAIN TYPE: TYPE: ACUTE PAIN

## 2022-04-01 ASSESSMENT — PAIN SCALES - GENERAL
PAINLEVEL_OUTOF10: 7
PAINLEVEL_OUTOF10: 5

## 2022-04-01 ASSESSMENT — PAIN DESCRIPTION - FREQUENCY: FREQUENCY: INTERMITTENT

## 2022-04-01 ASSESSMENT — PAIN DESCRIPTION - DESCRIPTORS: DESCRIPTORS: ACHING;DISCOMFORT

## 2022-04-01 NOTE — PROGRESS NOTES
Progress Note( Dr. Noemi Gary)  4/1/2022  Subjective:   Admit Date: 3/30/2022  PCP: Ese Nunn MD    Admitted For : Patient was initially admitted to medical floor for sepsis from UTI and generalized  weakness    Consulted For: Better control of blood glucose    Interval History: Patient was transferred from medical floor on 3/30/2022 and was admitted to acute rehab unit for rehabilitation and physical therapy        Denies any chest pains,   Denies SOB . Denies nausea or vomiting. No new bowel or bladder symptoms.        Intake/Output Summary (Last 24 hours) at 4/1/2022 1707  Last data filed at 4/1/2022 1647  Gross per 24 hour   Intake 596 ml   Output 650 ml   Net -54 ml       DATA    CBC:   Recent Labs     03/30/22  0153 03/30/22  0928 04/01/22  0808   WBC  --  10.4 8.4   HGB 7.9* 9.0* 7.7*   PLT  --  504* 443*    CMP:  Recent Labs     03/30/22  0928 04/01/22  0808   * 138   K 5.3* 4.9   CL 99 105   CO2 20* 21   BUN 54* 51*   CREATININE 2.2* 2.1*   CALCIUM 9.0 9.1   PROT 6.4 5.7*   LABALBU 3.6 3.1*   BILITOT 0.2 0.2   ALKPHOS 118 97   AST 14* 14*   ALT 13 11     Lipids: No results found for: CHOL, HDL, TRIG  Glucose:  Recent Labs     04/01/22  0900 04/01/22  1201 04/01/22  1639   POCGLU 89 187* 259*     [id number]:  Lab Results   Component Value Date    LABA1C 9.2 03/22/2022     High Sensitivity TSH:   Lab Results   Component Value Date    TSHHS 6.190 03/25/2022     Free T3: No results found for: FT3  Free T4:  Lab Results   Component Value Date    T4FREE 1.15 03/27/2022       No orders to display        Scheduled Medicines   Medications:    glipiZIDE  10 mg Oral BID AC    insulin glargine  10 Units SubCUTAneous Nightly    heparin (porcine)  5,000 Units SubCUTAneous 3 times per day    sodium chloride flush  5-40 mL IntraVENous 2 times per day    allopurinol  100 mg Oral Daily    alogliptin  6.25 mg Oral Daily    aspirin  81 mg Oral Daily    atorvastatin  40 mg Oral Nightly    carvedilol 3.125 mg Oral BID     chlorthalidone  25 mg Oral Daily    insulin lispro  0-12 Units SubCUTAneous TID     insulin lispro  0-12 Units SubCUTAneous Nightly    levothyroxine  50 mcg Oral Daily    mirtazapine  15 mg Oral Nightly    pregabalin  50 mg Oral BID    doxycycline hyclate  100 mg Oral 2 times per day      Infusions:    dextrose           Objective:   Vitals: BP (!) 155/92   Pulse 85   Temp 97.7 °F (36.5 °C)   Resp 15   Ht 5' 8\" (1.727 m)   Wt 211 lb 3.2 oz (95.8 kg)   SpO2 100%   BMI 32.11 kg/m²   General appearance: alert and cooperative with exam  Neck: no JVD or bruit  Thyroid : Normal lobes   Lungs: Has Vesicular Breath sounds   Heart:  regular rate and rhythm  Abdomen: soft, non-tender; bowel sounds normal; no masses,  no organomegaly has Surapubic Catheter   Musculoskeletal: Normal  Extremities: extremities normal, , no edema  Neurologic:  Awake, alert, oriented to name, place and time. Cranial nerves II-XII are grossly intact. Motor weakness . Sensory is intact. ,  and gait is abnormal.unstable     Assessment:     Patient Active Problem List:     Gait disturbance     Generalized weakness     Diabetes mellitus (HCC)     Osteoarthritis     Anemia of chronic disorder     Infected implanted bladder sphincter cuff     Escherichia coli urinary tract infection     Hypertension     Sepsis ([address]     Type 2 diabetes mellitus with hypoglycemia without coma ([address]     UTI (urinary tract infection) due to urinary indwelling catheter (HCC)     Hyperkalemia     Acute kidney failure (HCC)     Leg weakness, bilateral     Type 2 diabetes mellitus with neurological manifestations, uncontrolled ([address]     Complicated UTI (urinary tract infection)     Essential hypertension     Severe muscle deconditioning     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 ([address]     Frequent falls     Chronic kidney disease, stage 3a ([address]     Uncontrolled type 2 diabetes mellitus with peripheral neuropathy ([address] Prostate cancer ([facility]     Suprapubic catheter ([facility]     Sepsis secondary to UTI ([facility]      Plan:     1. Reviewed POC blood glucose . Labs and X ray results   2. Reviewed Current Medicines   3. On meal/ Correction bolus Humalog/ Basal Lantus Insulin regime / and   4. Monitor Blood glucose frequently   5. Modified  the dose of Insulin/ other medicines as needed  6. Ordered ultrasound of thyroid gland other thyroid function  7. On low-dose Synthroid of 50 mg/day  8. Patient was transferred to acute rehab unit on 3/30//2022   9. Patient to be participating in physical therapy and Occupational Therapy of acute rehab unit  10. Will follow     .      Cuong Parisi MD, MD

## 2022-04-01 NOTE — PROGRESS NOTES
Faheem Thompson    : 1940  Acct #: [de-identified]  MRN: [id number]              PM&R Progress Note      Admitting diagnosis: ***    Comorbid diagnoses impacting rehabilitation: ***    Chief complaint: ***    Prior (baseline) level of function: Independent.     Current level of function:         Current  IRF-LESTER and Goals:   Occupational Therapy:    Short term goals  Time Frame for Short term goals: STGs=LTGs :   Long term goals  Time Frame for Long term goals : 10-14 days or until d/c  Long term goal 1: Pt will complete grooming tasks Ind seated  Long term goal 2: Pt will complete total body bathing c S  Long term goal 3: Pt will complete UB dressing c setup  Long term goal 4: Pt will complete LB dressing c supervision using AE PRN  Long term goal 5: Pt will doff/don footwear c supervision using AE PRN  Long term goals 6: Pt will complete toileting c S  Long term goal 7: Pt will complete functional transfers (bed, chair, toilet, shower) c DME PRN and S  Long term goal 8: Pt will perform therex/therax to facilitate increased strength/endurance/ax tolerance (c emphasis on dynamic standing balance >8 mins, BUE endurance, cognitive retraining) c SBA :                                       Eating: Eating  Assistance Needed: Setup or clean-up assistance  Comment: assist to open packages/containers  CARE Score: 5  Discharge Goal: Independent       Oral Hygiene: Oral Hygiene  Assistance Needed: Supervision or touching assistance  Comment: seated, increased time for object manipulation  CARE Score: 4  Discharge Goal: Independent    UB/LB Bathing: Shower/Bathe Self  Assistance Needed: Partial/moderate assistance  Comment: required assist to bathe bottom and distal BLEs  CARE Score: 3  Discharge Goal: Supervision or touching assistance    UB Dressing: Upper Body Dressing  Assistance Needed: Partial/moderate assistance  Comment: able to thread BUEs, required assist to pull overhead, then able to pull down in front/back  CARE Score: 3  Discharge Goal: Set-up or clean-up assistance         LB Dressing: Lower Body Dressing  Assistance Needed: Dependent  Comment: poor distal reach and BLE strength impacting performance, required total assist to thread BLEs and catheter in brief and pants, perform pants management up  CARE Score: 1  Discharge Goal: Supervision or touching assistance    Donning and [place] Footwear: Putting On/Taking Off Footwear  Assistance Needed: Dependent  Comment: poor distal reach and strength, required total A to doff/don hospital socks  CARE Score: 1  Discharge Goal: Supervision or touching assistance      Toileting: Toileting Hygiene  Assistance Needed: Dependent  Comment: 2 person assist for balance, pants management/bowel hygiene  CARE Score: 1  Discharge Goal: Supervision or touching assistance      Toilet Transfers: Toilet Transfer  Assistance Needed: Dependent  Comment: performed x2 during OT eval; ranged from mod-max A x2 with fatigue.   Required significant cues for body mechanics and hand placement  CARE Score: 1  Discharge Goal: Supervision or touching assistance    Physical Therapy:         Long term goals  Time Frame for Long term goals : 12-14 days  Long term goal 1: Pt will perform bed mobility with mod I  Long term goal 2: Pt will perform sit to stand and pivot transfers with mod I, car transfers with CGA  Long term goal 3: Pt will ambulate 10' with 2ww with mod I, up to 150' with supervision including uneven surfaces  Long term goal 4: Pt will ascend/descend curb step with 2ww and up to 4 steps with B rails with supervision  Long term goal 5: Pt will  object from floor with 2ww and reacher with supervision      Bed Mobility:   Sit to Lying  Assistance Needed: Substantial/maximal assistance  Comment: assist with BLE and trunk  CARE Score: 2  Discharge Goal: Independent  Roll Left and Right  Assistance Needed: Substantial/maximal assistance  Comment: max B, no rails, but HOB slightly elevated due to back pain. CARE Score: 2  Discharge Goal: Independent  Lying to Sitting on Side of Bed  Assistance Needed: Substantial/maximal assistance  Comment: assist for BLE and trunk  CARE Score: 2  Discharge Goal: Independent    Transfers:    Sit to Stand  Comment: Pt unable to keep balance in sitting, becoming less alert. returned to supine. feel pt may be able to perform next session  Reason if not Attempted: Not attempted due to medical condition or safety concerns  CARE Score: 88  Discharge Goal: Independent  Chair/Bed-to-Chair Transfer  Comment: feel pt may be able to perform next session  Reason if not Attempted: Not attempted due to medical condition or safety concerns  CARE Score: 88  Discharge Goal: Independent     Car Transfer  Comment: feel pt may be able to attempt next session  Reason if not Attempted: Not attempted due to medical condition or safety concerns  CARE Score: 88  Discharge Goal: Supervision or touching assistance    Ambulation:    Walking Ability  Does the Patient Walk?: No     Walk 10 Feet  Comment: pt not able to remain alert to attempt.  do not feel pt will be able to perform in next session  Reason if not Attempted: Not attempted due to medical condition or safety concerns  CARE Score: 88  Discharge Goal: Independent     Walk 50 Feet with Two Turns  Reason if not Attempted: Not attempted due to medical condition or safety concerns  CARE Score: 88  Discharge Goal: Supervision or touching assistance     Walk 150 Feet  Reason if not Attempted: Not attempted due to medical condition or safety concerns  CARE Score: 88  Discharge Goal: Supervision or touching assistance     Walking 10 Feet on Uneven Surfaces  Reason if not Attempted: Not attempted due to medical condition or safety concerns  CARE Score: 88  Discharge Goal: Supervision or touching assistance     1 Step (Curb)  Reason if not Attempted: Not attempted due to medical condition or safety concerns  CARE Score: 88  Discharge Goal: Supervision or touching assistance     4 Steps  Reason if not Attempted: Not attempted due to medical condition or safety concerns  CARE Score: 88  Discharge Goal: Supervision or touching assistance     12 Steps  Reason if not Attempted: Not applicable  CARE Score: 9  Discharge Goal: Not Applicable       Wheelchair:  w/c Ability: Wheelchair Ability  Uses a Wheelchair and/or Scooter?: No                Balance:        Object: Picking Up Object  Reason if not Attempted: Not attempted due to medical condition or safety concerns  CARE Score: 88  Discharge Goal: Supervision or touching assistance    I      Exam:    Blood pressure (!) 159/67, pulse 83, temperature 98.1 °F (36.7 °C), resp. rate 16, height 5' 8\" (1.727 m), weight 205 lb 0.4 oz (93 kg), SpO2 100 %. General: ***    HEENT: ***    Pulmonary: ***    Cardiac: ***    Abdomen: Patient's abdomen is soft and nondistended. Bowel sounds were present throughout. There was no rebound, guarding or masses noted. Upper extremities: ***    Lower extremities: ***    Sitting balance was ***. Standing balance was ***.     Lab Results   Component Value Date    WBC 10.4 03/30/2022    HGB 9.0 (L) 03/30/2022    HCT 29.1 (L) 03/30/2022    MCV 95.1 03/30/2022     (H) 03/30/2022     Lab Results   Component Value Date    INR 1.20 03/17/2022    INR 0.99 05/19/2015    INR 1.31 03/23/2013    PROTIME 15.5 (H) 03/17/2022    PROTIME 11.3 05/19/2015    PROTIME 14.2 03/23/2013     Lab Results   Component Value Date    CREATININE 2.2 (H) 03/30/2022    BUN 54 (H) 03/30/2022     (L) 03/30/2022    K 5.3 (H) 03/30/2022    CL 99 03/30/2022    CO2 20 (L) 03/30/2022     Lab Results   Component Value Date    ALT 13 03/30/2022    AST 14 (L) 03/30/2022    ALKPHOS 118 03/30/2022    BILITOT 0.2 03/30/2022       Expected length of stay  prior to a supervised level of function for discharge home with a walker and C OT/PT is ***    Recommendations:    ***

## 2022-04-01 NOTE — PROGRESS NOTES
Physical Therapy  . [x] daily progress note       [] discharge       Patient Name:  Bronson Castillo   :  1940 MRN: [id number]  Room:  [address] [place] [address] Date of Admission: 3/30/2022  Rehabilitation Diagnosis:   Metabolic encephalopathy [O92.01]  Metabolic encephalopathy [T81.09]       Date 2022       Day of ARU Week:  3   Time IN/OUT 1258/1358   Individual Tx Minutes 60   Group Tx Minutes    Co-Treat Minutes    Concurrent Tx Minutes    TOTAL Tx Time Mins 60   Variance Time    Variance Time []   Refusal due to:     []   Medical hold/reason:    []   Illness   []   Off Unit for test/procedure  []   Extra time needed to complete task  []   Therapeutic need  []   Other (specify):   Restrictions Restrictions/Precautions  Restrictions/Precautions: General Precautions,Fall Risk,Contact Precautions (suprapubic catheter)      Interdisciplinary communication [x]   Cleared for therapy per nursing     []   RN notified about issues during session  []   RN updated on pt performance  []   Spoke with   []   Spoke with OT  []   Spoke with MD  []   Other:    Subjective observations and cognitive status: Pt sitting up in w/c upon entering. Alert but with fatigue. Agreeable to therapy     Pain level/location:   2-4 /10       Location: intermittent complaint in LLE   Discharge recommendations  Anticipated discharge date:  tbd  Destination: []home alone   []home alone with assist PRN     [x] home w/ family but alone at times     [] Continuous supervision  []SNF    [] Assisted living     [] Other:  Continued therapy: [x]HHC PT  []OUTPATIENT PT   [] No Further PT  []SNF PT  Caregiver training recommended: []Yes  [] No   Equipment needs: tbd. Feel pt may need w/c but son does not think appropriate width w/c will work in the home.  Will continue to assess     PT IRF-LESTER scores and goals for discharge assessment:   Roll Left and Right  Assistance Needed: Substantial/maximal assistance  Comment: max B, no rails, but HOB slightly elevated due to back pain. CARE Score: 2  Discharge Goal: Independent    Sit to Lying  Assistance Needed: Substantial/maximal assistance  Comment: assist with BLE and trunk  CARE Score: 2  Discharge Goal: Independent    Lying to Sitting on Side of Bed  Assistance Needed: Substantial/maximal assistance  Comment: assist for BLE and trunk  CARE Score: 2  Discharge Goal: Independent    Sit to Stand  Assistance Needed: Substantial/maximal assistance  Comment: max assist from w/c to 2ww  CARE Score: 2  Discharge Goal: Independent    Chair/Bed-to-Chair Transfer  Assistance Needed: Dependent  Comment: max assist sit to walker, mod assist of one for balance to turn, CG of a second person due to unsteadiness  CARE Score: 1  Discharge Goal: Independent    Toilet Transfer  Assistance Needed: Substantial/maximal assistance  Comment: max assist and max verbal cues for techique, posture, safety  CARE Score: 2  Discharge Goal: Supervision or touching assistance    Car Transfer  Comment: unable to attempt due to time contraint as pt felt he needed to use toilet  Reason if not Attempted: Not attempted due to medical condition or safety concerns  CARE Score: 88  Discharge Goal: Supervision or touching assistance    Walk 10 Feet?   Walk 10 Feet?: Yes    1 Step  Reason if not Attempted: Not attempted due to medical condition or safety concerns    Picking Up Object  Assistance Needed: Substantial/maximal assistance  Comment: pt initially CG when took R hand off walker, but began to lean to R with upper body and no righting reaction, pt requiring max assist by time he was able to grasp object with reacher and give reacher to therapist  CARE Score: 2  Discharge Goal: Supervision or touching assistance    Wheelchair Ability  Uses a Wheelchair and/or Scooter?: No                        Walking Ability  Does the Patient Walk?: Yes    Walk 10 Feet  Comment: pt ambulated 5' with 2ww with max assist of therapist and close w/c follow of second person. Pt with significantly flexed trunk and knees, very short and shuffling steps  CARE Score: 88  Discharge Goal: Independent    Walk 50 Feet with Two Turns  Reason if not Attempted: Not attempted due to medical condition or safety concerns  CARE Score: 88  Discharge Goal: Supervision or touching assistance    Walk 150 Feet  Reason if not Attempted: Not attempted due to medical condition or safety concerns  CARE Score: 88  Discharge Goal: Supervision or touching assistance    Walking 10 Feet on Uneven Surfaces  Reason if not Attempted: Not attempted due to medical condition or safety concerns  CARE Score: 88  Discharge Goal: Supervision or touching assistance    1 Step (Curb)  Reason if not Attempted: Not attempted due to medical condition or safety concerns  CARE Score: 88  Discharge Goal: Supervision or touching assistance    4 Steps  Reason if not Attempted: Not attempted due to medical condition or safety concerns  CARE Score: 88  Discharge Goal: Supervision or touching assistance    12 Steps  Reason if not Attempted: Not applicable  CARE Score: 9  Discharge Goal: Not Applicable      Additional Therapeutic activities/exercises completed this date:     []   Nu-step:  Time:        Level:         #Steps:       []   Rebounder:    []  Seated     []  Standing        []   Balance training         []   Postural training    []   Supine ther ex (reps/sets):     [x]   Seated ther ex (reps/sets): B hamstring prop stretch x 1 min followed by  LAQ (min assist LLE for full extension), knee flexion with glider, PF, DF each 10 x1    []   Standing ther ex (reps/sets):     [x]   Other:  Toileting activity completed with pt feeling he needed to have a bowel movement, but unable to complete   []   Other:    Comments:      Patient/Caregiver Education and Training:   []   Role of PT  [x]   Education about Dx  []   Use of call light for assist   []   HEP provided and explained   []   Treatment plan reviewed  []   Home safety  [x] Cane,4 wheeled walker,Rolling walker (pt thinks there may be a BSC from his wife in storage, but unsure)  ADL Assistance: Independent  Homemaking Assistance: Independent  Homemaking Responsibilities: Yes  Meal Prep Responsibility: Secondary  Laundry Responsibility: Primary  Cleaning Responsibility: Secondary  Bill Paying/Finance Responsibility: Primary  Shopping Responsibility: Secondary  Health Care Management: Primary (has pill box with alarms per pt. until recently [address] would set up, but once [facility] stopped he managed on own)  Ambulation Assistance: Independent (mod I with 4ww for up to 150'. pt used electronic carts at stores when able)  Transfer Assistance: Independent  Active : No  Patient's  Info: 3 sons assist in driving. Mode of Transportation: Car,SUV  Occupation: Retired  Type of occupation: Retired from [address] [place] [address]: TV, very little reading, no pets, doctors, son manages groceries,  Meds are managed via pill dispenser that son manages. Pt occasionally writes checks and some auto pay  Additional Comments: pt sleeps in flat bed. Pt has fallen >4 times in past year. Pt attributes many to low BS. No injury    Objective                                                                                    Goals:  (Update in navigator)   :   Long term goals  Time Frame for Long term goals : 12-14 days  Long term goal 1: Pt will perform bed mobility with mod I  Long term goal 2: Pt will perform sit to stand and pivot transfers with mod I, car transfers with CGA  Long term goal 3: Pt will ambulate 10' with 2ww with mod I, up to 150' with supervision including uneven surfaces  Long term goal 4: Pt will ascend/descend curb step with 2ww and up to 4 steps with B rails with supervision  Long term goal 5: Pt will  object from floor with 2ww and reacher with supervision:        Plan of Care Times per week: 5 days per week for a minimum of 60 minutes/day plus group as appropriate for 60 minutes.   Treatment to include Current Treatment Recommendations: Strengthening,ROM,Balance Training,Functional Mobility Training,Transfer Training,ADL/Self-care Training,Gait Training,Patient/Caregiver Education & Training,IADL Training,Stair training,Equipment Evaluation, Education, & procurement,Neuromuscular Re-education,Pain Management,Home Exercise Kiosked Training,Safety Education & Training,Cognitive/Perceptual Training,Cognitive Reorientation    Electronically signed by   Miladis Jung PT,   4/1/2022, 4:42 PM

## 2022-04-01 NOTE — PROGRESS NOTES
[facility]  ACUTE REHAB UNIT  SPEECH/LANGUAGE PATHOLOGY      [x] Daily           [] Discharge    Patient:Greg Montilla      :1940  ZWN:[id number]  Rehab Dx/Hx: Metabolic encephalopathy [J41.73]  Metabolic encephalopathy [A40.73]   No Known Allergies  Precautions:  Restrictions/Precautions: General Precautions,Fall Risk,Contact Precautions (suprapubic catheter)          Home Situation/IADL:   Social/Functional History  Lives With: Son  Type of Home: House  Home Layout: One level  Home Access: Ramped entrance  Bathroom Shower/Tub: Tub/Shower unit,Shower chair with back  Bathroom Toilet: Standard  Bathroom Equipment: Grab bars in shower,Shower chair  Bathroom Accessibility: Leo Villatoro accessible (states once he is in bathroom he uses countertops and grab bars, but pt not able to clarify if this is because there is little room or just preference)  Home Equipment: Cane,4 wheeled walker,Rolling walker (pt thinks there may be a BSC from his wife in storage, but unsure)  ADL Assistance: Independent  Homemaking Assistance: Independent  Homemaking Responsibilities: Yes  Meal Prep Responsibility: Secondary  Laundry Responsibility: Primary  Cleaning Responsibility: Secondary  Bill Paying/Finance Responsibility: Primary  Shopping Responsibility: Secondary  Health Care Management: Primary (has pill box with alarms per pt. until recently [address] would set up, but once [address] stopped he managed on own)  Ambulation Assistance: Independent (mod I with 4ww for up to 150'. pt used electronic carts at stores when able)  Transfer Assistance: Independent  Active : No  Patient's  Info: 3 sons assist in driving. Mode of Transportation: Car,SUV  Occupation: Retired  Type of occupation: Retired from [address] [place] [address] Street: TV, very little reading, no pets, doctors, son manages groceries,  Meds are managed via pill dispenser that son manages.   Pt occasionally writes checks and some auto pay  Additional Comments: pt sleeps in flat bed. Pt has fallen >4 times in past year. Pt attributes many to low BS. No injury      Date of Admit: 3/30/2022  Room #: 1025/1025-A      Number of Minutes/Billable Intervention  Cog/Memory Deficits 30    Aphasia/Language     Dysarthria/Speech     Apraxia/Speech     Dysphagia/Swallowing     Group     Other    TOTAL Minutes Billed  30    Variance          Date: 4/1/2022  Day of ARU Week:  3       SLP Individual Minutes  Time In: 0930  Time Out: 1000  Minutes: 30         Variance/Reason:  [] Refusal due to   [] Medical hold/reason  [] Illness   [] Off Unit for test/procedure  [] Extra time needed to complete task  [] Other (specify)    Activity completed: cognition assessed via BCAT. Pt was on rehab and seen by  in 2016 with d/c score of 43 at that time. Pain: denies  Current Diet: ADULT DIET; Regular; 5 carb choices (75 gm/meal)  ADULT ORAL NUTRITION SUPPLEMENT; Breakfast; Fortified Pudding Oral Supplement  ADULT ORAL NUTRITION SUPPLEMENT; Lunch, Dinner; Frozen Oral Supplement  Subjective: alert and cooperative      Goals and POC: Co-treats where appropriate with PT or OT to facilitate patient goals in functional tasks. LTG                           Short-term Goals  Timeframe for Short-term Goals: 1x/week x1 week 30 mins min--continue to assess for cognition when alert  Goal 1: Complete cognitive assessment when able to remain alert and establish goals as appropriate    The Brief Cognitive Assessment Tool (BCAT®) was administered 4/1/2022 to assess the following cognitive domains: orientation, verbal recall, visual recognition, visual recall, attention, abstraction, language, executive functions, and visuo-spatial processing. The emphasis of the tool is assessing contextual memory, executive functions, and attentional capacity.    Cognitive Impairment Scale:  NORMAL:    46-50   NO FUNCTIONAL DEFICIT; INDEPENDENT LIVING; MAY BE  SUBJECTIVE MEMORY COMPLAINTS BUT LITTLE TO NO EVIDENCE  MILD COGNITIVE  IMPAIRMENT; 34-46   GENERALLY FUNCTIONAL WITH EARLY SPECIFIC FX DECLINE (IADL)   LOWER SCORES MORE SUGGESTIVE OF ADDITIONAL SUPPORT NEEDS   BOTTOM RANGE SCORES CONSIDER MED MGMT AND SUPPORT FOR COMMUNITY REINTEGRATION    MILD DEMENTIA 26-34   IADL DEFICITS; CLEARLY OBJECTIVE EVIDENCE OF MEMORY AND OTHER COGNITIVE DECLINE; MED MGMT AND COMMUNITY REINTEGRATION SUPPORT NEEDED    MODERATE TO   SEVERE DEMENTIA 0-25   MODERATE (UPPER END OF RANGE)--PERVASIVE FX DEFICITS (IADLS) BUT ADLS GENERALLY INTACT   MARKED DEFICITS IN MEMORY AND EXECUTIVE FX; BEHAVIORAL AND PSYCH SYMPTOMS ARE COMMON      Dementia Impairment Scale:  Mild Cognitive Impairment  38  Mild Dementia  28  Moderate Dementia  18      BCAT score for Arnulfo Easton: 35/50 indicating mild cognitive impairment. Strengths:  Ability to put names to objects  Awareness of self, time, place, and situation  Determine how objects are similar to one another  Understand and express speech  Understand visual processes and relationships  Weaknesses:  Ability to concentrate and focus--reduced endurance and lethargy; kept requiring cues  \"Command and control\" cognitive activities    Ability to immediately recall a word list of 4 items: banana/justice/Jael/bridge. Pt able to complete immediate recall 4/4 and delayed recall 0/4 Improved with choice cues 4/4  Recall previously presented words over a time delay and recall elements of a story and previously presented pictures/story  Story specifics included:  Tessy Arcos / borrowed / $9 / from her brother / Masood Laws / last week. / She couldn't pay him back / because she bought /a delicious / ice cream cone / at the circus instead.    Pts immediate response was 5/11 and delayed response: 6/11  Strategies that improved performance included:   [x] repetition   [x] practical application    [] spaced retrieval    [x] choice cues    New goals added: Pt will improve problem solving for safety for

## 2022-04-01 NOTE — PROGRESS NOTES
Occupational Therapy    Physical Rehabilitation: OCCUPATIONAL THERAPY     [x] daily progress note       [] discharge       Patient Name:  Viviana Carty   :  1940 MRN: [id number]  Room:  [address] [place] [address] Date of Admission: 3/30/2022  Rehabilitation Diagnosis:   Metabolic encephalopathy [Z07.30]  Metabolic encephalopathy [V13.92]       Date 2022       Day of ARU Week:  3   Time IN/OUT 1445-7728  4939-7107   Individual Tx Minutes 82+38   Group Tx Minutes    Co-Treat Minutes    Concurrent Tx Minutes    TOTAL Tx Time Mins 120   Variance Time    Variance Time []   Refusal due to:     []   Medical hold/reason:    []   Illness   []   Off Unit for test/procedure  []   Extra time needed to complete task  []   Therapeutic need  []   Other (specify):   Restrictions Restrictions/Precautions: General Precautions,Fall Risk,Contact Precautions (suprapubic catheter)         Communication with other providers: [x]   OK to see per nursing:     []   Spoke with team member regarding:      Subjective observations and cognitive status: Pt sleeping on approach; able to wake pt easily, pt agreeable to therapy. Received pt in therapy gym. Pt agreeable to therapy.     Pain level/location:    /10       Location:    Discharge recommendations  Anticipated discharge date:  TBD  Destination: []home alone   []home alone w assist prn   [] home w/ family    [] Continuous supervision       []SNF    [] Assisted living     [] Other:   Continued therapy: []HHC OT  []OUTPATIENT  OT   [] No Further OT  Equipment needs: TBD        ADLs:    Eating: Eating  Assistance Needed: Setup or clean-up assistance  Comment: assist to open packages/containers  CARE Score: 5  Discharge Goal: Independent       Oral Hygiene: Oral Hygiene  Assistance Needed: Supervision or touching assistance  Comment: seated, increased time for object manipulation  CARE Score: 4  Discharge Goal: Independent    UB/LB Bathing: Shower/Bathe Self  Assistance Needed: Partial/moderate assistance  Comment: required assist to bathe bottom; educated pt on use of LHS; pt able to bathe distal BLEs, required assist to dry feet  CARE Score: 3  Discharge Goal: Supervision or touching assistance    UB Dressing: Upper Body Dressing  Assistance Needed: Partial/moderate assistance  Comment: able to thread BUEs into shirt, required assist to pull overhead, cues to pull down in front and back  CARE Score: 3  Discharge Goal: Set-up or clean-up assistance         LB Dressing: Lower Body Dressing  Assistance Needed: Substantial/maximal assistance  Comment: Pt required assist to thread mccrary bag and BLEs into brief and pants and to perform pants management up  CARE Score: 2  Discharge Goal: Supervision or touching assistance    Donning and [place] Footwear: Putting On/Taking Off Footwear  Assistance Needed: Substantial/maximal assistance  Comment: educated pt on use of sock aid; required max A to doff/don socks  CARE Score: 2  Discharge Goal: Supervision or touching assistance      Toileting: Toileting Hygiene  Assistance Needed: Substantial/maximal assistance  Comment: Max A for pants management and bowel hygiene  CARE Score: 2  Discharge Goal: Supervision or touching assistance      Toilet Transfers:   Max A  Toilet Transfer  Assistance Needed: Substantial/maximal assistance  Comment: Max A using grab bars; required cues for posture and hand placement  CARE Score: 2  Discharge Goal: Supervision or touching assistance  Device Used:    [x]   Standard Toilet         [x]   Grab Bars           []  Bedside Commode       []   Elevated Toilet          []   Other:        Bed Mobility:           []   Pt received out of bed   Supine --> Sit:  Min A   Sit --> Supine: Mod A for LEs     Transfers:    Sit--> Stand: Mod/max A   Stand --> Sit:   Max A for controlled descent cues for hand placement   Stand-Pivot:    Max A using RW; max cues for hand placement and body mechanics   PM: max A using RW; Pt wanted to sit before full turn, requiring max cues to complete full pivot before sitting. Other:    Assistive device required for transfer:   RW       Functional Mobility: To increase BUE strength and endurance for functional transfers and ADLs, Pt self propelled WC 24 ft   Assistance:  Min A   Device:   []   Charisma Philip     []   Standard Walker []   Wheelchair        []   Thedojoshua Chanel       []   4-Wheeled Tanmay Gambler         []   Cardiac Walker       []   Other:        Additional Therapeutic activities/exercises completed this date:     [x]   ADL Training   []   Balance/Postural training     []   Bed/Transfer Training   []   Endurance Training   []   Neuromuscular Re-ed   []   Nu-step:  Time:        Level:         #Steps:       []   Rebounder:    []  Seated     []  Standing        []   Supine Ther Ex (reps/sets):     [x]   Seated Ther Ex (reps/sets): To increase BUE endurance for ADLs and transfers, pt completed 1 set x10 reps of the following therex, c a 2# weighted bar:   Shoulder presses  Chest presses  Shoulder abduction/adduction  Shoulder horizontal abduction/adduction  Concentric arm circles (clockwise and counterclockwise)  Bicep curls     []   Standing Ther Ex (reps/sets):     []   Other:      Comments:      Patient/Caregiver Education and Training:   []   [employer] Equipment Use  []   Bed Mobility/Transfer Technique/Safety  []   Energy Conservation Tips  []   Family training  []   Postural Awareness  []   Safety During Functional Activities  []   Reinforced Patient's Precautions   []   Progress was updated and reviewed in Rehabtracker with patient and/or family this         date. Treatment Plan for Next Session: Continue OT POC       Assessment: This pt demonstrated a positive response to today's treatment as evidenced by actively engaged in therapy session. The patient is making progress toward established goals as evidenced by QI scores.  Ongoing deficits are observed in the areas of UB strength, endurance, functional transfers and continued focus on this is recommended. Treatment/Activity Tolerance:   [] Tolerated treatment with no adverse effects    [x] Patient limited by fatigue  [] Patient limited by pain   [] Patient limited by medical complications:    [] Adverse reaction to Tx:   [] Significant change in status    Safety:       [x]  bed alarm set    [x]  chair alarm set    []  Pt refused alarms                []  Telesitter activated      [x]  Gait belt used during tx session      []other:       Number of Minutes/Billable Intervention  Therapeutic Exercise 15   ADL Self-care 82   Neuro Re-Ed    Therapeutic Activity 23   Group    Other:    TOTAL 120       Social History  Social/Functional History  Lives With: Son  Type of Home: House  Home Layout: One level  Home Access: Ramped entrance  Bathroom Shower/Tub: Tub/Shower unit,Shower chair with back  Bathroom Toilet: Standard  Bathroom Equipment: Grab bars in shower,Shower chair  Bathroom Accessibility: Sheryl Ply accessible (states once he is in bathroom he uses countertops and grab bars, but pt not able to clarify if this is because there is little room or just preference)  Home Equipment: Cane,4 wheeled walker,Rolling walker (pt thinks there may be a BSC from his wife in storage, but unsure)  ADL Assistance: Independent  Homemaking Assistance: Independent  Homemaking Responsibilities: Yes  Meal Prep Responsibility: Secondary  Laundry Responsibility: Primary  Cleaning Responsibility: Secondary  Bill Paying/Finance Responsibility: Primary  Shopping Responsibility: Secondary  Health Care Management: Primary (has pill box with alarms per pt. until recently [address] would set up, but once [facility] stopped he managed on own)  Ambulation Assistance: Independent (mod I with 4ww for up to 150'. pt used electronic carts at stores when able)  Transfer Assistance: Independent  Active : No  Patient's  Info: 3 sons assist in driving.   Mode of Transportation: Car,SUV  Occupation: Retired  Type of occupation: Retired from [address] [place] [address]: TV, very little reading, no pets, doctors, son manages groceries,  Meds are managed via pill dispenser that son manages. Pt occasionally writes checks and some auto pay  Additional Comments: pt sleeps in flat bed. Pt has fallen >4 times in past year. Pt attributes many to low BS. No injury    Objective                                                                                    Goals:  (Update in navigator)  Short term goals  Time Frame for Short term goals: STGs=LTGs:  Long term goals  Time Frame for Long term goals : 10-14 days or until d/c  Long term goal 1: Pt will complete grooming tasks Ind seated  Long term goal 2: Pt will complete total body bathing c S  Long term goal 3: Pt will complete UB dressing c setup  Long term goal 4: Pt will complete LB dressing c supervision using AE PRN  Long term goal 5: Pt will doff/don footwear c supervision using AE PRN  Long term goals 6: Pt will complete toileting c S  Long term goal 7: Pt will complete functional transfers (bed, chair, toilet, shower) c DME PRN and S  Long term goal 8: Pt will perform therex/therax to facilitate increased strength/endurance/ax tolerance (c emphasis on dynamic standing balance >8 mins, BUE endurance, cognitive retraining) c SBA:        Plan of Care                                                                              Times per week: 5 days per week for a minimum of 60 minutes/day plus group as appropriate for 60 minutes.   Treatment to include Plan  Times per day: Daily  Current Treatment Recommendations: Strengthening,ROM,Balance Training,Functional Mobility Training,Endurance [employer] Management,Safety Education & Training,Patient/Caregiver Education & Training,Equipment Evaluation, Education, & procurement,Self-Care / ADL,Home Management Training,Cognitive/Perceptual Training    Electronically signed by   KHARI Henderson,  4/1/2022, 12:56 PM

## 2022-04-01 NOTE — PROGRESS NOTES
Nephrology Progress Note  4/1/2022 12:23 PM        Subjective:   Admit Date: 3/30/2022  PCP: Juancho Dunne MD    Interval History: Patient seen in early morning, this is a late entry    Diet: Eating reasonably well    ROS: Had low blood sugar in the mornings lab, no overt shortness of breath or confusion  Urine output was not recorded  No fever and acceptable blood pressure    Data:     Current meds:    insulin glargine  15 Units SubCUTAneous Nightly    glipiZIDE  10 mg Oral BID AC    heparin (porcine)  5,000 Units SubCUTAneous 3 times per day    sodium chloride flush  5-40 mL IntraVENous 2 times per day    allopurinol  100 mg Oral Daily    alogliptin  6.25 mg Oral Daily    aspirin  81 mg Oral Daily    atorvastatin  40 mg Oral Nightly    carvedilol  3.125 mg Oral BID WC    chlorthalidone  25 mg Oral Daily    insulin lispro  0-12 Units SubCUTAneous TID WC    insulin lispro  0-12 Units SubCUTAneous Nightly    levothyroxine  50 mcg Oral Daily    mirtazapine  15 mg Oral Nightly    pregabalin  50 mg Oral BID    doxycycline hyclate  100 mg Oral 2 times per day      dextrose           I/O last 3 completed shifts: In: 8838 [P.O.:1196;  I.V.:10]  Out: 850 [Urine:850]    CBC:   Recent Labs     03/30/22  0153 03/30/22  0928 04/01/22  0808   WBC  --  10.4 8.4   HGB 7.9* 9.0* 7.7*   PLT  --  504* 443*          Recent Labs     03/30/22  0928 04/01/22  0808   * 138   K 5.3* 4.9   CL 99 105   CO2 20* 21   BUN 54* 51*   CREATININE 2.2* 2.1*   GLUCOSE 270* 49*       Lab Results   Component Value Date    CALCIUM 9.1 04/01/2022    PHOS 5.1 (H) 04/01/2022       Objective:     Vitals: BP (!) 120/51   Pulse 76   Temp 98.4 °F (36.9 °C) (Oral)   Resp 15   Ht 5' 8\" (1.727 m)   Wt 211 lb 3.2 oz (95.8 kg)   SpO2 97%   BMI 32.11 kg/m²     General appearance: Alert, awake and oriented  HEENT: Positive conjunctival pallor  Neck: Seemed supple  Lungs: Few adventitial breath sound but no gross crackles  Heart: Irregular  Abdomen: Soft  Extremities: Positive edema  He has Hopper catheter      Problem List :         Impression :     1. Acute kidney injury with underlying CKD stage 43-stable so far-he has ureteral stent  2.  hyperkalemia also resolved  3. Diabetes with hyperglycemia-would avoid hypoglycemic episode  4. Underlying hypertension, diabetes and dysrhythmia    Recommendation/Plan  :     1. Avoid hypoglycemia  2. Good glycemic control  3. Is only with oral doxycycline  4. Probably either reduce the glipizide or reduce the long-acting insulin  5.  Follow clinically and biochemically      Valeria Kaur MD MD

## 2022-04-02 LAB
GLUCOSE BLD-MCNC: 182 MG/DL (ref 70–99)
GLUCOSE BLD-MCNC: 209 MG/DL (ref 70–99)
GLUCOSE BLD-MCNC: 221 MG/DL (ref 70–99)
GLUCOSE BLD-MCNC: 83 MG/DL (ref 70–99)
GLUCOSE BLD-MCNC: 91 MG/DL (ref 70–99)

## 2022-04-02 PROCEDURE — 97542 WHEELCHAIR MNGMENT TRAINING: CPT

## 2022-04-02 PROCEDURE — 6370000000 HC RX 637 (ALT 250 FOR IP): Performed by: PHYSICAL MEDICINE & REHABILITATION

## 2022-04-02 PROCEDURE — 97110 THERAPEUTIC EXERCISES: CPT

## 2022-04-02 PROCEDURE — 1280000000 HC REHAB R&B

## 2022-04-02 PROCEDURE — 97535 SELF CARE MNGMENT TRAINING: CPT

## 2022-04-02 PROCEDURE — 6370000000 HC RX 637 (ALT 250 FOR IP): Performed by: INTERNAL MEDICINE

## 2022-04-02 PROCEDURE — 94761 N-INVAS EAR/PLS OXIMETRY MLT: CPT

## 2022-04-02 PROCEDURE — 2580000003 HC RX 258: Performed by: INTERNAL MEDICINE

## 2022-04-02 PROCEDURE — 6360000002 HC RX W HCPCS: Performed by: INTERNAL MEDICINE

## 2022-04-02 PROCEDURE — 82962 GLUCOSE BLOOD TEST: CPT

## 2022-04-02 PROCEDURE — 97530 THERAPEUTIC ACTIVITIES: CPT

## 2022-04-02 RX ADMIN — CHLORTHALIDONE 25 MG: 25 TABLET ORAL at 09:01

## 2022-04-02 RX ADMIN — HEPARIN SODIUM 5000 UNITS: 5000 INJECTION INTRAVENOUS; SUBCUTANEOUS at 06:35

## 2022-04-02 RX ADMIN — ATORVASTATIN CALCIUM 40 MG: 40 TABLET, FILM COATED ORAL at 21:48

## 2022-04-02 RX ADMIN — CARVEDILOL 3.12 MG: 6.25 TABLET, FILM COATED ORAL at 09:01

## 2022-04-02 RX ADMIN — CARVEDILOL 3.12 MG: 6.25 TABLET, FILM COATED ORAL at 17:11

## 2022-04-02 RX ADMIN — DOXYCYCLINE HYCLATE 100 MG: 100 TABLET, FILM COATED ORAL at 09:01

## 2022-04-02 RX ADMIN — DOXYCYCLINE HYCLATE 100 MG: 100 TABLET, FILM COATED ORAL at 21:48

## 2022-04-02 RX ADMIN — ALOGLIPTIN 6.25 MG: 6.25 TABLET, FILM COATED ORAL at 09:05

## 2022-04-02 RX ADMIN — GLIPIZIDE 10 MG: 5 TABLET ORAL at 17:11

## 2022-04-02 RX ADMIN — ACETAMINOPHEN 650 MG: 325 TABLET ORAL at 06:35

## 2022-04-02 RX ADMIN — LEVOTHYROXINE SODIUM 50 MCG: 0.05 TABLET ORAL at 06:35

## 2022-04-02 RX ADMIN — ASPIRIN 81 MG 81 MG: 81 TABLET ORAL at 09:01

## 2022-04-02 RX ADMIN — INSULIN LISPRO 2 UNITS: 100 INJECTION, SOLUTION INTRAVENOUS; SUBCUTANEOUS at 17:15

## 2022-04-02 RX ADMIN — SODIUM CHLORIDE, PRESERVATIVE FREE 10 ML: 5 INJECTION INTRAVENOUS at 08:59

## 2022-04-02 RX ADMIN — MIRTAZAPINE 15 MG: 15 TABLET, ORALLY DISINTEGRATING ORAL at 21:48

## 2022-04-02 RX ADMIN — HEPARIN SODIUM 5000 UNITS: 5000 INJECTION INTRAVENOUS; SUBCUTANEOUS at 14:43

## 2022-04-02 RX ADMIN — INSULIN LISPRO 4 UNITS: 100 INJECTION, SOLUTION INTRAVENOUS; SUBCUTANEOUS at 13:37

## 2022-04-02 RX ADMIN — PREGABALIN 50 MG: 50 CAPSULE ORAL at 21:48

## 2022-04-02 RX ADMIN — ALLOPURINOL 100 MG: 100 TABLET ORAL at 09:01

## 2022-04-02 RX ADMIN — HEPARIN SODIUM 5000 UNITS: 5000 INJECTION INTRAVENOUS; SUBCUTANEOUS at 21:48

## 2022-04-02 RX ADMIN — SODIUM CHLORIDE, PRESERVATIVE FREE 10 ML: 5 INJECTION INTRAVENOUS at 21:48

## 2022-04-02 RX ADMIN — PREGABALIN 50 MG: 50 CAPSULE ORAL at 09:01

## 2022-04-02 ASSESSMENT — PAIN DESCRIPTION - ORIENTATION
ORIENTATION: RIGHT
ORIENTATION: LEFT

## 2022-04-02 ASSESSMENT — PAIN SCALES - GENERAL
PAINLEVEL_OUTOF10: 7
PAINLEVEL_OUTOF10: 7
PAINLEVEL_OUTOF10: 0

## 2022-04-02 ASSESSMENT — PAIN DESCRIPTION - DESCRIPTORS
DESCRIPTORS: DISCOMFORT;ACHING
DESCRIPTORS: ACHING

## 2022-04-02 ASSESSMENT — PAIN DESCRIPTION - ONSET
ONSET: GRADUAL
ONSET: GRADUAL

## 2022-04-02 ASSESSMENT — PAIN DESCRIPTION - LOCATION
LOCATION: LEG
LOCATION: BACK

## 2022-04-02 ASSESSMENT — PAIN DESCRIPTION - FREQUENCY
FREQUENCY: INTERMITTENT
FREQUENCY: CONTINUOUS

## 2022-04-02 ASSESSMENT — PAIN DESCRIPTION - PAIN TYPE
TYPE: ACUTE PAIN
TYPE: ACUTE PAIN

## 2022-04-02 ASSESSMENT — PAIN - FUNCTIONAL ASSESSMENT: PAIN_FUNCTIONAL_ASSESSMENT: ACTIVITIES ARE NOT PREVENTED

## 2022-04-02 NOTE — PROGRESS NOTES
Kenzie Rom    : 1940  Acct #: [de-identified]  MRN: [id number]              PM&R Progress Note      Admitting diagnosis: ***    Comorbid diagnoses impacting rehabilitation: ***    Chief complaint: ***    Prior (baseline) level of function: Independent.     Current level of function:         Current  IRF-LESTER and Goals:   Occupational Therapy:    Short term goals  Time Frame for Short term goals: STGs=LTGs :   Long term goals  Time Frame for Long term goals : 10-14 days or until d/c  Long term goal 1: Pt will complete grooming tasks Ind seated  Long term goal 2: Pt will complete total body bathing c S  Long term goal 3: Pt will complete UB dressing c setup  Long term goal 4: Pt will complete LB dressing c supervision using AE PRN  Long term goal 5: Pt will doff/don footwear c supervision using AE PRN  Long term goals 6: Pt will complete toileting c S  Long term goal 7: Pt will complete functional transfers (bed, chair, toilet, shower) c DME PRN and S  Long term goal 8: Pt will perform therex/therax to facilitate increased strength/endurance/ax tolerance (c emphasis on dynamic standing balance >8 mins, BUE endurance, cognitive retraining) c SBA :                                       Eating: Eating  Assistance Needed: Setup or clean-up assistance  Comment: assist to open packages/containers  CARE Score: 5  Discharge Goal: Independent       Oral Hygiene: Oral Hygiene  Assistance Needed: Supervision or touching assistance  Comment: seated, increased time for object manipulation  CARE Score: 4  Discharge Goal: Independent    UB/LB Bathing: Shower/Bathe Self  Assistance Needed: Partial/moderate assistance  Comment: required assist to bathe bottom; educated pt on use of LHS; pt able to bathe distal BLEs, required assist to dry feet  CARE Score: 3  Discharge Goal: Supervision or touching assistance    UB Dressing: Upper Body Dressing  Assistance Needed: Partial/moderate assistance  Comment: able to thread BUEs into shirt, required assist to pull overhead, cues to pull down in front and back  CARE Score: 3  Discharge Goal: Set-up or clean-up assistance         LB Dressing: Lower Body Dressing  Assistance Needed: Substantial/maximal assistance  Comment: Pt required assist to thread mccrary bag and BLEs into brief and pants and to perform pants management up  CARE Score: 2  Discharge Goal: Supervision or touching assistance    Donning and [place] Footwear: Putting On/Taking Off Footwear  Assistance Needed: Substantial/maximal assistance  Comment: educated pt on use of sock aid; required max A to doff/don socks  CARE Score: 2  Discharge Goal: Supervision or touching assistance      Toileting: Toileting Hygiene  Assistance Needed: Substantial/maximal assistance  Comment: Max A for pants management and bowel hygiene  CARE Score: 2  Discharge Goal: Supervision or touching assistance      Toilet Transfers:   Toilet Transfer  Assistance Needed: Substantial/maximal assistance  Comment: max assist and max verbal cues for techique, posture, safety  CARE Score: 2  Discharge Goal: Supervision or touching assistance    Physical Therapy:         Long term goals  Time Frame for Long term goals : 12-14 days  Long term goal 1: Pt will perform bed mobility with mod I  Long term goal 2: Pt will perform sit to stand and pivot transfers with mod I, car transfers with CGA  Long term goal 3: Pt will ambulate 10' with 2ww with mod I, up to 150' with supervision including uneven surfaces  Long term goal 4: Pt will ascend/descend curb step with 2ww and up to 4 steps with B rails with supervision  Long term goal 5: Pt will  object from floor with 2ww and reacher with supervision      Bed Mobility:   Sit to Lying  Assistance Needed: Substantial/maximal assistance  Comment: assist with BLE and trunk  CARE Score: 2  Discharge Goal: Independent  Roll Left and Right  Assistance Needed: Substantial/maximal assistance  Comment: max B, no rails, but HOB slightly elevated due to back pain. CARE Score: 2  Discharge Goal: Independent  Lying to Sitting on Side of Bed  Assistance Needed: Substantial/maximal assistance  Comment: assist for BLE and trunk  CARE Score: 2  Discharge Goal: Independent    Transfers:    Sit to Stand  Assistance Needed: Substantial/maximal assistance  Comment: max assist from w/c to 2ww  Reason if not Attempted: Not attempted due to medical condition or safety concerns  CARE Score: 2  Discharge Goal: Independent  Chair/Bed-to-Chair Transfer  Assistance Needed: Dependent  Comment: max assist sit to walker, mod assist of one for balance to turn, CG of a second person due to unsteadiness  Reason if not Attempted: Not attempted due to medical condition or safety concerns  CARE Score: 1  Discharge Goal: Independent  Toilet Transfer  Assistance Needed: Substantial/maximal assistance  Comment: max assist and max verbal cues for techique, posture, safety  CARE Score: 2  Car Transfer  Comment: unable to attempt due to time contraint as pt felt he needed to use toilet  Reason if not Attempted: Not attempted due to medical condition or safety concerns  CARE Score: 88  Discharge Goal: Supervision or touching assistance    Ambulation:    Walking Ability  Does the Patient Walk?: Yes     Walk 10 Feet  Comment: pt ambulated 5' with 2ww with max assist of therapist and close w/c follow of second person.  Pt with significantly flexed trunk and knees, very short and shuffling steps  Reason if not Attempted: Not attempted due to medical condition or safety concerns  CARE Score: 88  Discharge Goal: Independent     Walk 50 Feet with Two Turns  Reason if not Attempted: Not attempted due to medical condition or safety concerns  CARE Score: 88  Discharge Goal: Supervision or touching assistance     Walk 150 Feet  Reason if not Attempted: Not attempted due to medical condition or safety concerns  CARE Score: 88  Discharge Goal: Supervision or touching assistance Walking 10 Feet on Uneven Surfaces  Reason if not Attempted: Not attempted due to medical condition or safety concerns  CARE Score: 88  Discharge Goal: Supervision or touching assistance     1 Step (Curb)  Reason if not Attempted: Not attempted due to medical condition or safety concerns  CARE Score: 88  Discharge Goal: Supervision or touching assistance     4 Steps  Reason if not Attempted: Not attempted due to medical condition or safety concerns  CARE Score: 88  Discharge Goal: Supervision or touching assistance     12 Steps  Reason if not Attempted: Not applicable  CARE Score: 9  Discharge Goal: Not Applicable       Wheelchair:  w/c Ability: Wheelchair Ability  Uses a Wheelchair and/or Scooter?: No                Balance:        Object: Picking Up Object  Assistance Needed: Substantial/maximal assistance  Comment: pt initially CG when took R hand off walker, but began to lean to R with upper body and no righting reaction, pt requiring max assist by time he was able to grasp object with reacher and give reacher to therapist  Reason if not Attempted: Not attempted due to medical condition or safety concerns  CARE Score: 2  Discharge Goal: Supervision or touching assistance    I      Exam:    Blood pressure 139/61, pulse 80, temperature 97.7 °F (36.5 °C), resp. rate 15, height 5' 8\" (1.727 m), weight 211 lb 3.2 oz (95.8 kg), SpO2 98 %. General: ***    HEENT: ***    Pulmonary: ***    Cardiac: ***    Abdomen: Patient's abdomen is soft and nondistended. Bowel sounds were present throughout. There was no rebound, guarding or masses noted. Upper extremities: ***    Lower extremities: ***    Sitting balance was ***. Standing balance was ***.     Lab Results   Component Value Date    WBC 8.4 04/01/2022    HGB 7.7 (L) 04/01/2022    HCT 25.4 (L) 04/01/2022    MCV 98.1 04/01/2022     (H) 04/01/2022     Lab Results   Component Value Date    INR 1.20 03/17/2022    INR 0.99 05/19/2015    INR 1.31 03/23/2013    PROTIME 15.5 (H) 03/17/2022    PROTIME 11.3 05/19/2015    PROTIME 14.2 03/23/2013     Lab Results   Component Value Date    CREATININE 2.1 (H) 04/01/2022    BUN 51 (H) 04/01/2022     04/01/2022    K 4.9 04/01/2022     04/01/2022    CO2 21 04/01/2022     Lab Results   Component Value Date    ALT 11 04/01/2022    AST 14 (L) 04/01/2022    ALKPHOS 97 04/01/2022    BILITOT 0.2 04/01/2022       Expected length of stay  prior to a supervised level of function for discharge home with a walker and HHC OT/PT is ***    Recommendations:    ***

## 2022-04-02 NOTE — PROGRESS NOTES
Nephrology Progress Note  4/2/2022 8:52 AM        Subjective:   Admit Date: 3/30/2022  PCP: Ese Nunn MD    Interval History: No major event overnight    Diet: Eating good    ROS: No shortness of breath  Urine output recorded 1.65 L for the last 24 hours  No fever and acceptable blood pressure    Data:     Current meds:    glipiZIDE  10 mg Oral BID AC    insulin glargine  10 Units SubCUTAneous Nightly    heparin (porcine)  5,000 Units SubCUTAneous 3 times per day    sodium chloride flush  5-40 mL IntraVENous 2 times per day    allopurinol  100 mg Oral Daily    alogliptin  6.25 mg Oral Daily    aspirin  81 mg Oral Daily    atorvastatin  40 mg Oral Nightly    carvedilol  3.125 mg Oral BID WC    chlorthalidone  25 mg Oral Daily    insulin lispro  0-12 Units SubCUTAneous TID WC    insulin lispro  0-12 Units SubCUTAneous Nightly    levothyroxine  50 mcg Oral Daily    mirtazapine  15 mg Oral Nightly    pregabalin  50 mg Oral BID    doxycycline hyclate  100 mg Oral 2 times per day      dextrose           I/O last 3 completed shifts: In: 836 [P.O.:836]  Out: 1650 [Urine:1650]    CBC:   Recent Labs     03/30/22  0928 04/01/22  0808   WBC 10.4 8.4   HGB 9.0* 7.7*   * 443*          Recent Labs     03/30/22  0928 04/01/22  0808   * 138   K 5.3* 4.9   CL 99 105   CO2 20* 21   BUN 54* 51*   CREATININE 2.2* 2.1*   GLUCOSE 270* 49*       Lab Results   Component Value Date    CALCIUM 9.1 04/01/2022    PHOS 5.1 (H) 04/01/2022       Objective:     Vitals: BP (!) 162/72   Pulse 87   Temp 97.9 °F (36.6 °C) (Oral)   Resp 18   Ht 5' 8\" (1.727 m)   Wt 210 lb 15.7 oz (95.7 kg)   SpO2 100%   BMI 32.08 kg/m²     General appearance: Alert, awake and oriented  HEENT: Less conjunctival pallor now  Neck: Supple  Lungs: Coarse breath sound no gross crackles  Heart: Regular rate and rhythm this morning  Abdomen: Soft  Extremities: 1+ edema      Problem List :         Impression :     1.  Acute kidney injury with underlying CKD stage 3B/IV A3-acceptable urine output with chronic indwelling catheter as well as-left ureteral stent-his baseline creatinine about 2-stable so far  2. Underlying hypertension, diabetes and dysrhythmia-acceptable  3. Had proteinuria more than 1 g/day    Recommendation/Plan  :     1. Good blood pressure and blood sugar control  2. Unable to use ACE or ARB due to hyperkalemia  3. We will check the lab Monday morning-if potassium less than 4.5 and add ARB therapy  4.  Follow clinically, biochemically-next Monday      Syl Sandoval MD MD

## 2022-04-02 NOTE — PLAN OF CARE
Problem: Skin Integrity:  Goal: Will show no infection signs and symptoms  Description: Will show no infection signs and symptoms  Outcome: Ongoing  Goal: Absence of new skin breakdown  Description: Absence of new skin breakdown  Outcome: Ongoing     Problem: Infection:  Goal: Will remain free from infection  Description: Will remain free from infection  Outcome: Ongoing     Problem: Safety:  Goal: Free from accidental physical injury  Description: Free from accidental physical injury  Outcome: Ongoing  Goal: Free from intentional harm  Description: Free from intentional harm  Outcome: Ongoing     Problem: Daily Care:  Goal: Daily care needs are met  Description: Daily care needs are met  Outcome: Ongoing     Problem: Pain:  Goal: Patient's pain/discomfort is manageable  Description: Patient's pain/discomfort is manageable  Outcome: Ongoing  Goal: Pain level will decrease  Description: Pain level will decrease  Outcome: Ongoing  Goal: Control of acute pain  Description: Control of acute pain  Outcome: Ongoing  Goal: Control of chronic pain  Description: Control of chronic pain  Outcome: Ongoing     Problem: Skin Integrity:  Goal: Skin integrity will stabilize  Description: Skin integrity will stabilize  Outcome: Ongoing     Problem: Discharge Planning:  Goal: Patients continuum of care needs are met  Description: Patients continuum of care needs are met  Outcome: Ongoing     Problem: Mobility - Impaired:  Goal: Mobility will improve  Description: Mobility will improve  Outcome: Ongoing

## 2022-04-02 NOTE — PROGRESS NOTES
Progress Note( Dr. Bony Arce)  4/2/2022  Subjective:   Admit Date: 3/30/2022  PCP: Julio Fischer MD    Admitted For : Patient was initially admitted to medical floor for sepsis from UTI and generalized  weakness    Consulted For: Better control of blood glucose    Interval History: Patient was transferred from medical floor on 3/30/2022 and was admitted to acute rehab unit for rehabilitation and physical therapy  Continue to running low blood glucose in the mornings        Denies any chest pains,   Denies SOB . Denies nausea or vomiting. No new bowel or bladder symptoms.        Intake/Output Summary (Last 24 hours) at 4/2/2022 1235  Last data filed at 4/2/2022 0900  Gross per 24 hour   Intake 600 ml   Output 1650 ml   Net -1050 ml       DATA    CBC:   Recent Labs     04/01/22  0808   WBC 8.4   HGB 7.7*   *    CMP:  Recent Labs     04/01/22  0808      K 4.9      CO2 21   BUN 51*   CREATININE 2.1*   CALCIUM 9.1   PROT 5.7*   LABALBU 3.1*   BILITOT 0.2   ALKPHOS 97   AST 14*   ALT 11     Lipids: No results found for: CHOL, HDL, TRIG  Glucose:  Recent Labs     04/02/22  0319 04/02/22  0755 04/02/22  1142   POCGLU 91 83 221*     CawqofjbipV3G:  Lab Results   Component Value Date    LABA1C 9.2 03/22/2022     High Sensitivity TSH:   Lab Results   Component Value Date    TSHHS 6.190 03/25/2022     Free T3: No results found for: FT3  Free T4:  Lab Results   Component Value Date    T4FREE 1.15 03/27/2022       No orders to display        Scheduled Medicines   Medications:    glipiZIDE  10 mg Oral BID AC    [Held by provider] insulin glargine  10 Units SubCUTAneous Nightly    heparin (porcine)  5,000 Units SubCUTAneous 3 times per day    sodium chloride flush  5-40 mL IntraVENous 2 times per day    allopurinol  100 mg Oral Daily    alogliptin  6.25 mg Oral Daily    aspirin  81 mg Oral Daily    atorvastatin  40 mg Oral Nightly    carvedilol  3.125 mg Oral BID WC    chlorthalidone  25 mg Oral Daily    insulin lispro  0-12 Units SubCUTAneous TID     insulin lispro  0-12 Units SubCUTAneous Nightly    levothyroxine  50 mcg Oral Daily    mirtazapine  15 mg Oral Nightly    pregabalin  50 mg Oral BID    doxycycline hyclate  100 mg Oral 2 times per day      Infusions:    dextrose           Objective:   Vitals: BP (!) 162/72   Pulse 87   Temp 97.9 °F (36.6 °C) (Oral)   Resp 18   Ht 5' 8\" (1.727 m)   Wt 210 lb 15.7 oz (95.7 kg)   SpO2 100%   BMI 32.08 kg/m²   General appearance: alert and cooperative with exam  Neck: no JVD or bruit  Thyroid : Normal lobes   Lungs: Has Vesicular Breath sounds   Heart:  regular rate and rhythm  Abdomen: soft, non-tender; bowel sounds normal; no masses,  no organomegaly has Surapubic Catheter   Musculoskeletal: Normal  Extremities: extremities normal, , no edema  Neurologic:  Awake, alert, oriented to name, place and time. Cranial nerves II-XII are grossly intact. Motor weakness . Sensory is intact. ,  and gait is abnormal.unstable     Assessment:     Patient Active Problem List:     Gait disturbance     Generalized weakness     Diabetes mellitus (HCC)     Osteoarthritis     Anemia of chronic disorder     Infected implanted bladder sphincter cuff     Escherichia coli urinary tract infection     Hypertension     Sepsis ([address]     Type 2 diabetes mellitus with hypoglycemia without coma ([address]     UTI (urinary tract infection) due to urinary indwelling catheter (HCC)     Hyperkalemia     Acute kidney failure (HCC)     Leg weakness, bilateral     Type 2 diabetes mellitus with neurological manifestations, uncontrolled ([address]     Complicated UTI (urinary tract infection)     Essential hypertension     Severe muscle deconditioning     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 (HCC)     Frequent falls     Chronic kidney disease, stage 3a ([address]     Uncontrolled type 2 diabetes mellitus with peripheral neuropathy (HCC)     Prostate cancer ([address]     Suprapubic catheter ([facility]     Sepsis secondary to UTI [facility])      Plan:     1. Reviewed POC blood glucose . Labs and X ray results   2. Reviewed Current Medicines   3. On meal/ Correction bolus Humalog/ Basal Lantus Insulin. On hold for now regime / and   4. Monitor Blood glucose frequently   5. Modified  the dose of Insulin/ other medicines as needed  6. Ordered ultrasound of thyroid gland other thyroid function  7. On low-dose Synthroid of 50 mg/day  8. Patient was transferred to acute rehab unit on 3/30//2022   9. Patient to be participating in physical therapy and Occupational Therapy of acute rehab unit  10. Will follow     .      Yasmeen Mckenzie MD, MD

## 2022-04-02 NOTE — PROGRESS NOTES
Physical Therapy      [x] daily progress note       [] discharge       Patient Name:  Veena Booker   :  1940 MRN: [id number]  Room:  [address] [place] [address] Date of Admission: 3/30/2022  Rehabilitation Diagnosis:   Metabolic encephalopathy [Q93.88]  Metabolic encephalopathy [D04.21]       Date 2022       Day of ARU Week:  4   Time IN/OUT 8285-0069  2264-5219   Individual Tx Minutes 80+40   TOTAL Tx Time Mins 120   Variance Time    Variance Time []   Refusal due to:     []   Medical hold/reason:    []   Illness   []   Off Unit for test/procedure  []   Extra time needed to complete task  []   Therapeutic need  []   Other (specify):   Restrictions Restrictions/Precautions  Restrictions/Precautions: General Precautions,Fall Risk,Contact Precautions (suprapubic catheter)      Communication with other providers: [x]   OK to see per nursing:   Nsg reports pt very lethargic this AM with BS low, pt had OJ and breakfast. Discussed with nsg pt's continued lethargy. Nsg reports VSS earlier this AM as well, no reports from night RN regarding pt not sleeping well.   []   Spoke with team member regarding:      Subjective observations and cognitive status: Pt asleep in bed, pt very lethargic, max effort to awaken. Rehab aide in to A with pt due to safety concerns/ lethargy; Pt VS /60, HR 74, O2 sats 96; Once sitting EOB pt becoming more alert but req extra time. Tendency to keep eyes closed during session req max cues. Pt also reports difficulty keeping head up with ax due to weakness. Pt also confused with place and situation req re-orientation x 2 during session. PM: Pt was yelling out for help upon arrival. Was trying to reach room telephone on bed and states \" I was trying to get hold of my son. \" Assisted pt with scooting back in recliner. Pt then agreeable to session. More alert and engaged during PM session.  Pt did complain of L UE and LE tingling with movement, when asked if having tingling on R UE and LE, pt states \"no\". Pt also weaker with L LE ROM req more A than L. Pt also coughing when given water through straw, no coughing episode without straw. Discussed with nsg. Pain level/location: No reports of pain in AM   Discharge recommendations  Anticipated discharge date:  tbd  Destination: []?home alone   []?home alone with assist PRN     [x]? home w/ family but alone at times     []? Continuous supervision  []? SNF    []? Assisted living     []? Other:  Continued therapy: [x]? [facility] PT  []? OUTPATIENT PT   []? No Further PT  []? SNF PT  Caregiver training recommended: []? Yes  []? No   Equipment needs: tbd. Feel pt may need w/c but son does not think appropriate width w/c will work in the home. Will continue to assess     Bed Mobility:           []   Pt received out of bed   Scooting: Max A supine scoot to L, pt with poor initiation of task; sit scoot to EOB Max A x 1, pt with poor initiation  Supine --> Sit:  Mod A at trunk and LEs  Bed features used: [] Yes    [x] HOB elevated      [x] Bed rail                                    [] No     Transfers:    Sit--> Stand: Mod A + CGA of second person for safety due to fwd lean; pt stood from [facility] bed [facility]; sit>stand from [facility] CGA; from [facility] to [place] Max A x 1. Pt req max cues for foot and hand placement on Stedy prior to transfer. Stand --> Sit:   Max A to WC, Min A to elevated bed, CGA to Stedy seat  Stand-Pivot:   Dependent; pt transferred to [facility] and to recliner via [employer] device required for transfer:   Thomas Aw:   Distance:   24'+18' fwd, 28'+21' bkwd for quad strengthening   Assistance:   SB-Min A and max TCs/VCS for hand placement on wheels, continuation of task, improved cervical extension to keep gaze fwd ( pt unable to maintain due to lethargy/weakness) and coordination of UEs/LEs  Extremities Used:   UEs and LEs fwd, LEs only bkwd.     Additional Therapeutic activities/exercises completed this date: []   Nu-step:  Time:        Level:         #Steps:       []   Rebounder:    []  Seated     []  Standing        [x]   Balance training: Static sitting EOB initally Min A, then improved to SBA. Pt demos flexed posture and unable to maintain cervical extension to neutral. Pt RAQUEL did improve once sitting EOB. Static standing in [facility]  Min A -CGA, max cues for increased hip and knee extension, Pt with tendency to lean fwd onto bar of Stedy req cues for upright posture. Pt able to stand 3 trials ~ 30-45\" each. []   Postural training    [x]   B LE ther ex (reps/sets):   Supine:   Ankle Pumps x 20  Quad Sets x 20  Gluteal Sets x 20  Heel Slides x 20 AA-AROM L LE  Short Arc Quads x 20  Hip Abduction x 20 AAROM L  Sitting:  Long Arc Quads x  15AA-AROM L, cues for increased knee extension R  Seated Marching x 20       []   Seated ther ex (reps/sets):     []   Standing ther ex (reps/sets):     []   Picked up object from floor                       []   Reacher used   []   Other:   []   Other:   []   Other:      Patient/Caregiver Education and Training:   [x]   Bed Mobility/Transfer technique/safety  [x]   WC technique/sequencing  []   Proper use of assistive device  []   Advanced mobility safety and technique  []   Reinforced patient's precautions with mobility/functional tasks  [x]   Postural awareness with sitting and standing  []   Family training  []   Other:    Treatment Plan for Next Session: bed mobility, hip/quad/core strengthening, transfers and standing tolerance from elevated surface, WC mobility       Treatment/Activity Tolerance:   [x] Tolerated treatment with no adverse effects    [x] Patient limited by fatigue  [] Patient limited by pain   [] Patient limited by medical complications:    [] Adverse reaction to Tx:   [] Significant change in status    Safety:       []  bed alarm set    [x]  chair alarm set    []  Pt refused alarms                []  Telesitter activated      [x]  Gait belt used during injury    Objective                                                                                    Goals:  (Update in navigator)   : Long term goals  Time Frame for Long term goals : 12-14 days  Long term goal 1: Pt will perform bed mobility with mod I  Long term goal 2: Pt will perform sit to stand and pivot transfers with mod I, car transfers with CGA  Long term goal 3: Pt will ambulate 10' with 2ww with mod I, up to 150' with supervision including uneven surfaces  Long term goal 4: Pt will ascend/descend curb step with 2ww and up to 4 steps with B rails with supervision  Long term goal 5: Pt will  object from floor with 2ww and reacher with supervision:        Plan of Care                                                                              Times per week: 5 days per week for a minimum of 60 minutes/day plus group as appropriate for 60 minutes.   Treatment to include Current Treatment Recommendations: Strengthening,ROM,Balance Training,Functional Mobility Training,Transfer Training,ADL/Self-care Training,Gait Training,Patient/Caregiver Education & Training,IADL Training,Stair training,Equipment Evaluation, Education, & procurement,Neuromuscular Re-education,Pain Management,Home Exercise quietrevolution Training,Safety Education & Training,Cognitive/Perceptual Training,Cognitive Reorientation    Electronically signed by   Wen Redmond, DION #[id number]  4/2/2022, 8:29 AM

## 2022-04-02 NOTE — PROGRESS NOTES
Occupational Therapy    Physical Rehabilitation: OCCUPATIONAL THERAPY     [x] daily progress note       [] discharge       Patient Name:  Michelle Espinosa   :  1940 MRN: [id number]  Room:  [address] [place] [address] Date of Admission: 3/30/2022  Rehabilitation Diagnosis:   Metabolic encephalopathy [C27.30]  Metabolic encephalopathy [K41.55]       Date 2022       Day of ARU Week:  4   Time IN/OUT 3904-5908   Individual Tx Minutes 58   Group Tx Minutes    Co-Treat Minutes    Concurrent Tx Minutes    TOTAL Tx Time Mins 62   Variance Time    Variance Time []   Refusal due to:     []   Medical hold/reason:    []   Illness   []   Off Unit for test/procedure  [x]   Extra time needed to complete task  []   Therapeutic need  []   Other (specify):   Restrictions Restrictions/Precautions: General Precautions,Fall Risk,Contact Precautions (suprapubic catheter)         Communication with other providers: [x]   OK to see per nursing:     [x]   Spoke with team member regarding: Pt. Sores on bottom. Subjective observations and cognitive status:  Upon arrival pt. Was asleep in recliner. Pt. Struggled to keep his eyes open during session but was very pleasant and agreeable to treatment session. Pt. Complained of pain on bottom when using restroom dt sore, therapist applied cream and informed nursing. Pain level/location:    /10       Location:    Discharge recommendations  Anticipated discharge date:  TBD  Destination: []?home alone   []?home alone w assist prn   []? home w/ family    []? Continuous supervision       []? SNF    []? Assisted living     []? Other:   Continued therapy: []?[facility] OT  []? OUTPATIENT  OT   []? No Further OT  Equipment needs: TBD     Toileting: Total A dt severe fatigue and needing to hold onto [employer]. Pt. Could utilize [facility] over toilet. The distance is too far to get back up from. Toilet Transfers:   Sera Steady - Max A with sit to stand from toilet utilizing [employer].  Pt. Required Max Functional Activities  []   Reinforced Patient's Precautions   []   Progress was updated and reviewed in Rehabtracker with patient and/or family this         date. Treatment Plan for Next Session: Continue OT POC. Assessment: This pt demonstrated a positive response to today's treatment as evidenced by pleasant and agreeable response to therapy. The patient is making progress toward established goals as evidenced by QI scores. Ongoing deficits are observed in the areas of BUE strength, ADLs, tfs, functional mobility, activity and standing tolerance and continued focus on these areas is recommended.       Treatment/Activity Tolerance:   [] Tolerated treatment with no adverse effects    [x] Patient limited by fatigue  [] Patient limited by pain   [] Patient limited by medical complications:    [] Adverse reaction to Tx:   [] Significant change in status    Safety:       [x]  bed alarm set    []  chair alarm set    []  Pt refused alarms                []  Telesitter activated      [x]  Gait belt used during tx session      []other:       Number of Minutes/Billable Intervention  Therapeutic Exercise 25   ADL Self-care 37   Neuro Re-Ed    Therapeutic Activity    Group    Other:    TOTAL 62       Social History  Social/Functional History  Lives With: Son  Type of Home: House  Home Layout: One level  Home Access: Ramped entrance  Bathroom Shower/Tub: Tub/Shower unit,Shower chair with back  Bathroom Toilet: Standard  Bathroom Equipment: Grab bars in shower,Shower chair  Bathroom Accessibility: Lexie Matter accessible (states once he is in bathroom he uses countertops and grab bars, but pt not able to clarify if this is because there is little room or just preference)  Home Equipment: Cane,4 wheeled walker,Rolling walker (pt thinks there may be a BSC from his wife in storage, but unsure)  ADL Assistance: [address] [place] [address]: Independent  Homemaking Responsibilities: Yes  Meal Prep Responsibility: Secondary  Laundry Responsibility: Primary  Cleaning Responsibility: Secondary  Bill Paying/Finance Responsibility: Primary  Shopping Responsibility: Secondary  Health Care Management: Primary (has pill box with alarms per pt. until recently Tyrell Hodge nursing would set up, but once Tyrell Hodge stopped he managed on own)  Ambulation Assistance: Independent (mod I with 4ww for up to 150'. pt used electronic carts at stores when able)  Transfer Assistance: Independent  Active : No  Patient's  Info: 3 sons assist in driving. Mode of Transportation: Car,SUV  Occupation: Retired  Type of occupation: Retired from [address] [place] [address]: TV, very little reading, no pets, doctors, son manages groceries,  Meds are managed via pill dispenser that son manages. Pt occasionally writes checks and some auto pay  Additional Comments: pt sleeps in flat bed. Pt has fallen >4 times in past year. Pt attributes many to low BS.  No injury    Objective                                                                                    Goals:  (Update in navigator)  Short term goals  Time Frame for Short term goals: STGs=LTGs:  Long term goals  Time Frame for Long term goals : 10-14 days or until d/c  Long term goal 1: Pt will complete grooming tasks Ind seated  Long term goal 2: Pt will complete total body bathing c S  Long term goal 3: Pt will complete UB dressing c setup  Long term goal 4: Pt will complete LB dressing c supervision using AE PRN  Long term goal 5: Pt will doff/don footwear c supervision using AE PRN  Long term goals 6: Pt will complete toileting c S  Long term goal 7: Pt will complete functional transfers (bed, chair, toilet, shower) c DME PRN and S  Long term goal 8: Pt will perform therex/therax to facilitate increased strength/endurance/ax tolerance (c emphasis on dynamic standing balance >8 mins, BUE endurance, cognitive retraining) c SBA:        Plan of Care Times per week: 5 days per week for a minimum of 60 minutes/day plus group as appropriate for 60 minutes.   Treatment to include Plan  Times per day: Daily  Current Treatment Recommendations: Strengthening,ROM,Balance Training,Functional Mobility Training,Endurance Training,Pain Management,Safety Education & Training,Patient/Caregiver Education & Training,Equipment Evaluation, Education, & procurement,Self-Care / ADL,Home Management Training,Cognitive/Perceptual Training    Electronically signed by   KHARI Dolan,  4/2/2022, 12:59 PM

## 2022-04-03 LAB
GLUCOSE BLD-MCNC: 119 MG/DL (ref 70–99)
GLUCOSE BLD-MCNC: 146 MG/DL (ref 70–99)
GLUCOSE BLD-MCNC: 198 MG/DL (ref 70–99)
GLUCOSE BLD-MCNC: 207 MG/DL (ref 70–99)
GLUCOSE BLD-MCNC: 251 MG/DL (ref 70–99)

## 2022-04-03 PROCEDURE — 6360000002 HC RX W HCPCS: Performed by: INTERNAL MEDICINE

## 2022-04-03 PROCEDURE — 1280000000 HC REHAB R&B

## 2022-04-03 PROCEDURE — 94761 N-INVAS EAR/PLS OXIMETRY MLT: CPT

## 2022-04-03 PROCEDURE — 6370000000 HC RX 637 (ALT 250 FOR IP): Performed by: INTERNAL MEDICINE

## 2022-04-03 PROCEDURE — 2580000003 HC RX 258: Performed by: INTERNAL MEDICINE

## 2022-04-03 PROCEDURE — 82962 GLUCOSE BLOOD TEST: CPT

## 2022-04-03 PROCEDURE — 6370000000 HC RX 637 (ALT 250 FOR IP): Performed by: PHYSICAL MEDICINE & REHABILITATION

## 2022-04-03 RX ADMIN — ASPIRIN 81 MG 81 MG: 81 TABLET ORAL at 09:59

## 2022-04-03 RX ADMIN — CARVEDILOL 3.12 MG: 6.25 TABLET, FILM COATED ORAL at 17:30

## 2022-04-03 RX ADMIN — CARVEDILOL 3.12 MG: 6.25 TABLET, FILM COATED ORAL at 09:59

## 2022-04-03 RX ADMIN — DOXYCYCLINE HYCLATE 100 MG: 100 TABLET, FILM COATED ORAL at 09:59

## 2022-04-03 RX ADMIN — HEPARIN SODIUM 5000 UNITS: 5000 INJECTION INTRAVENOUS; SUBCUTANEOUS at 21:38

## 2022-04-03 RX ADMIN — PREGABALIN 50 MG: 50 CAPSULE ORAL at 09:59

## 2022-04-03 RX ADMIN — ATORVASTATIN CALCIUM 40 MG: 40 TABLET, FILM COATED ORAL at 21:34

## 2022-04-03 RX ADMIN — HEPARIN SODIUM 5000 UNITS: 5000 INJECTION INTRAVENOUS; SUBCUTANEOUS at 06:27

## 2022-04-03 RX ADMIN — TRAMADOL HYDROCHLORIDE 50 MG: 50 TABLET, COATED ORAL at 04:21

## 2022-04-03 RX ADMIN — TRAMADOL HYDROCHLORIDE 50 MG: 50 TABLET, COATED ORAL at 21:34

## 2022-04-03 RX ADMIN — GLIPIZIDE 10 MG: 5 TABLET ORAL at 17:01

## 2022-04-03 RX ADMIN — ALLOPURINOL 100 MG: 100 TABLET ORAL at 09:59

## 2022-04-03 RX ADMIN — CHLORTHALIDONE 25 MG: 25 TABLET ORAL at 09:59

## 2022-04-03 RX ADMIN — SODIUM CHLORIDE, PRESERVATIVE FREE 10 ML: 5 INJECTION INTRAVENOUS at 09:45

## 2022-04-03 RX ADMIN — INSULIN LISPRO 2 UNITS: 100 INJECTION, SOLUTION INTRAVENOUS; SUBCUTANEOUS at 17:30

## 2022-04-03 RX ADMIN — LEVOTHYROXINE SODIUM 50 MCG: 0.05 TABLET ORAL at 06:27

## 2022-04-03 RX ADMIN — MIRTAZAPINE 15 MG: 15 TABLET, ORALLY DISINTEGRATING ORAL at 21:34

## 2022-04-03 RX ADMIN — SODIUM CHLORIDE, PRESERVATIVE FREE 10 ML: 5 INJECTION INTRAVENOUS at 21:44

## 2022-04-03 RX ADMIN — HEPARIN SODIUM 5000 UNITS: 5000 INJECTION INTRAVENOUS; SUBCUTANEOUS at 14:00

## 2022-04-03 RX ADMIN — DOXYCYCLINE HYCLATE 100 MG: 100 TABLET, FILM COATED ORAL at 21:34

## 2022-04-03 RX ADMIN — PREGABALIN 50 MG: 50 CAPSULE ORAL at 21:34

## 2022-04-03 ASSESSMENT — PAIN DESCRIPTION - FREQUENCY
FREQUENCY: CONTINUOUS
FREQUENCY: CONTINUOUS

## 2022-04-03 ASSESSMENT — PAIN - FUNCTIONAL ASSESSMENT
PAIN_FUNCTIONAL_ASSESSMENT: ACTIVITIES ARE NOT PREVENTED
PAIN_FUNCTIONAL_ASSESSMENT: ACTIVITIES ARE NOT PREVENTED

## 2022-04-03 ASSESSMENT — PAIN SCALES - GENERAL
PAINLEVEL_OUTOF10: 0
PAINLEVEL_OUTOF10: 8
PAINLEVEL_OUTOF10: 2

## 2022-04-03 ASSESSMENT — PAIN DESCRIPTION - LOCATION
LOCATION: HIP
LOCATION: HIP

## 2022-04-03 ASSESSMENT — PAIN DESCRIPTION - DESCRIPTORS
DESCRIPTORS: DISCOMFORT;ACHING
DESCRIPTORS: DISCOMFORT;ACHING

## 2022-04-03 ASSESSMENT — PAIN DESCRIPTION - ONSET
ONSET: ON-GOING
ONSET: ON-GOING

## 2022-04-03 ASSESSMENT — PAIN DESCRIPTION - PAIN TYPE
TYPE: ACUTE PAIN
TYPE: ACUTE PAIN

## 2022-04-03 ASSESSMENT — PAIN DESCRIPTION - PROGRESSION
CLINICAL_PROGRESSION: NOT CHANGED
CLINICAL_PROGRESSION: NOT CHANGED

## 2022-04-03 NOTE — PROGRESS NOTES
Progress Note( Dr. Tavon Burns)  4/3/2022  Subjective:   Admit Date: 3/30/2022  PCP: Fern Canseco MD    Admitted For : Patient was initially admitted to medical floor for sepsis from UTI and generalized  weakness    Consulted For: Better control of blood glucose    Interval History: Patient was transferred from medical floor on 3/30/2022 and was admitted to acute rehab unit for rehabilitation and physical therapy  Blood sugar control better now        Denies any chest pains,   Denies SOB . Denies nausea or vomiting. No new bowel or bladder symptoms.        Intake/Output Summary (Last 24 hours) at 4/3/2022 1124  Last data filed at 4/3/2022 3679  Gross per 24 hour   Intake 840 ml   Output 800 ml   Net 40 ml       DATA    CBC:   Recent Labs     04/01/22  0808   WBC 8.4   HGB 7.7*   *    CMP:  Recent Labs     04/01/22  0808      K 4.9      CO2 21   BUN 51*   CREATININE 2.1*   CALCIUM 9.1   PROT 5.7*   LABALBU 3.1*   BILITOT 0.2   ALKPHOS 97   AST 14*   ALT 11     Lipids: No results found for: CHOL, HDL, TRIG  Glucose:  Recent Labs     04/02/22  2018 04/03/22  0211 04/03/22  0738   POCGLU 209* 146* 119*     WaeshtotywG1V:  Lab Results   Component Value Date    LABA1C 9.2 03/22/2022     High Sensitivity TSH:   Lab Results   Component Value Date    TSHHS 6.190 03/25/2022     Free T3: No results found for: FT3  Free T4:  Lab Results   Component Value Date    T4FREE 1.15 03/27/2022       No orders to display        Scheduled Medicines   Medications:    glipiZIDE  10 mg Oral BID AC    [Held by provider] insulin glargine  10 Units SubCUTAneous Nightly    heparin (porcine)  5,000 Units SubCUTAneous 3 times per day    sodium chloride flush  5-40 mL IntraVENous 2 times per day    allopurinol  100 mg Oral Daily    alogliptin  6.25 mg Oral Daily    aspirin  81 mg Oral Daily    atorvastatin  40 mg Oral Nightly    carvedilol  3.125 mg Oral BID WC    chlorthalidone  25 mg Oral Daily    insulin lispro  0-12 Units SubCUTAneous TID     insulin lispro  0-12 Units SubCUTAneous Nightly    levothyroxine  50 mcg Oral Daily    mirtazapine  15 mg Oral Nightly    pregabalin  50 mg Oral BID    doxycycline hyclate  100 mg Oral 2 times per day      Infusions:    dextrose           Objective:   Vitals: /73   Pulse 82   Temp 98.2 °F (36.8 °C) (Oral)   Resp 18   Ht 5' 8\" (1.727 m)   Wt 206 lb 12.7 oz (93.8 kg)   SpO2 98%   BMI 31.44 kg/m²   General appearance: alert and cooperative with exam  Neck: no JVD or bruit  Thyroid : Normal lobes   Lungs: Has Vesicular Breath sounds   Heart:  regular rate and rhythm  Abdomen: soft, non-tender; bowel sounds normal; no masses,  no organomegaly has Surapubic Catheter   Musculoskeletal: Normal  Extremities: extremities normal, , no edema  Neurologic:  Awake, alert, oriented to name, place and time. Cranial nerves II-XII are grossly intact. Motor weakness . Sensory is intact. ,  and gait is abnormal.unstable     Assessment:     Patient Active Problem List:     Gait disturbance     Generalized weakness     Diabetes mellitus (HCC)     Osteoarthritis     Anemia of chronic disorder     Infected implanted bladder sphincter cuff     Escherichia coli urinary tract infection     Hypertension     Sepsis ([address]     Type 2 diabetes mellitus with hypoglycemia without coma ([address]     UTI (urinary tract infection) due to urinary indwelling catheter (HCC)     Hyperkalemia     Acute kidney failure (HCC)     Leg weakness, bilateral     Type 2 diabetes mellitus with neurological manifestations, uncontrolled ([address]     Complicated UTI (urinary tract infection)     Essential hypertension     Severe muscle deconditioning     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 (HCC)     Frequent falls     Chronic kidney disease, stage 3a ([address]     Uncontrolled type 2 diabetes mellitus with peripheral neuropathy (HCC)     Prostate cancer ([address]     Suprapubic catheter ([address]     Sepsis secondary to UTI [facility])      Plan:     1. Reviewed POC blood glucose . Labs and X ray results   2. Reviewed Current Medicines   3. On meal/ Correction bolus Humalog/ Basal Lantus Insulin. On hold for now regime / and   4. Monitor Blood glucose frequently   5. Modified  the dose of Insulin/ other medicines as needed  6. Ordered ultrasound of thyroid gland other thyroid function  7. On low-dose Synthroid of 50 mg/day  8. Patient was transferred to acute rehab unit on 3/30//2022   9. Patient to be participating in physical therapy and Occupational Therapy of acute rehab unit  10. Will follow     .      Salma Arnold MD, MD

## 2022-04-03 NOTE — PROGRESS NOTES
abdomen  Extremities: 2+ pedal edema and 1+ leg edema      Problem List :         Impression :     1. 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 Underlying hypertension, diabetes, dysrhythmia and proteinuria more than 1 g/day  3. Probably significant anemia given conjunctival pallor    Recommendation/Plan  :     1. CMP, CBC differential, magnesium and phosphorus tomorrow morning  2. Manual blood pressure when possible  3. Good glycemic control  4. Follow clinically  5.  Watch for any iatrogenic nosocomial complication      Good Browne MD MD

## 2022-04-03 NOTE — PLAN OF CARE
Problem: Skin Integrity:  Goal: Will show no infection signs and symptoms  Description: Will show no infection signs and symptoms  4/2/2022 2301 by Tiffany Lerma LPN  Outcome: Ongoing  4/2/2022 1103 by Mohit Hernandez LPN  Outcome: Ongoing  Goal: Absence of new skin breakdown  Description: Absence of new skin breakdown  4/2/2022 2301 by Tiffany Lerma LPN  Outcome: Ongoing  4/2/2022 1103 by Mohit Hernandez LPN  Outcome: Ongoing     Problem: Infection:  Goal: Will remain free from infection  Description: Will remain free from infection  4/2/2022 2301 by Tiffany Lerma LPN  Outcome: Ongoing  4/2/2022 1103 by Mohit Hernandez LPN  Outcome: Ongoing     Problem: Safety:  Goal: Free from accidental physical injury  Description: Free from accidental physical injury  4/2/2022 2301 by Tiffany Lerma LPN  Outcome: Ongoing  4/2/2022 1103 by Mohit Hernandez LPN  Outcome: Ongoing  Goal: Free from intentional harm  Description: Free from intentional harm  4/2/2022 2301 by Tiffany Lerma LPN  Outcome: Ongoing  4/2/2022 1103 by Mohit Hernandez LPN  Outcome: Ongoing     Problem: Daily Care:  Goal: Daily care needs are met  Description: Daily care needs are met  4/2/2022 2301 by Tiffany Lerma LPN  Outcome: Ongoing  4/2/2022 1103 by Mohit Hernandez LPN  Outcome: Ongoing     Problem: Pain:  Goal: Patient's pain/discomfort is manageable  Description: Patient's pain/discomfort is manageable  4/2/2022 2301 by Tiffany Lerma LPN  Outcome: Ongoing  4/2/2022 1103 by Mohit Hernandez LPN  Outcome: Ongoing  Goal: Pain level will decrease  Description: Pain level will decrease  4/2/2022 2301 by Tiffany Lerma LPN  Outcome: Ongoing  4/2/2022 1103 by Mohit Hernandez LPN  Outcome: Ongoing  Goal: Control of acute pain  Description: Control of acute pain  4/2/2022 2301 by Tiffany Lerma LPN  Outcome: Ongoing  4/2/2022 1103 by Mohit Hernandez LPN  Outcome: Ongoing  Goal: Control of chronic pain  Description: Control of chronic pain  4/2/2022 2301 by Roxanne Martines LPN  Outcome: Ongoing  4/2/2022 1103 by Elton Schneider LPN  Outcome: Ongoing     Problem: Skin Integrity:  Goal: Skin integrity will stabilize  Description: Skin integrity will stabilize  4/2/2022 2301 by Roxanne Martines LPN  Outcome: Ongoing  4/2/2022 1103 by Elton Schneider LPN  Outcome: Ongoing     Problem: Discharge Planning:  Goal: Patients continuum of care needs are met  Description: Patients continuum of care needs are met  4/2/2022 2301 by Roxanne Martines LPN  Outcome: Ongoing  4/2/2022 1103 by Elton Schneider LPN  Outcome: Ongoing     Problem: Mobility - Impaired:  Goal: Mobility will improve  Description: Mobility will improve  4/2/2022 2301 by Roxanne Martines LPN  Outcome: Ongoing  4/2/2022 1103 by Elton Schneider LPN  Outcome: Ongoing

## 2022-04-04 LAB
ALBUMIN SERPL-MCNC: 3.5 GM/DL (ref 3.4–5)
ALP BLD-CCNC: 108 IU/L (ref 40–128)
ALT SERPL-CCNC: 12 U/L (ref 10–40)
AMMONIA: 22 UMOL/L (ref 16–60)
ANION GAP SERPL CALCULATED.3IONS-SCNC: 17 MMOL/L (ref 4–16)
AST SERPL-CCNC: 14 IU/L (ref 15–37)
BASOPHILS ABSOLUTE: 0.1 K/CU MM
BASOPHILS RELATIVE PERCENT: 0.6 % (ref 0–1)
BILIRUB SERPL-MCNC: 0.2 MG/DL (ref 0–1)
BUN BLDV-MCNC: 54 MG/DL (ref 6–23)
CALCIUM SERPL-MCNC: 9.6 MG/DL (ref 8.3–10.6)
CHLORIDE BLD-SCNC: 103 MMOL/L (ref 99–110)
CO2: 19 MMOL/L (ref 21–32)
CREAT SERPL-MCNC: 2.2 MG/DL (ref 0.9–1.3)
DIFFERENTIAL TYPE: ABNORMAL
EOSINOPHILS ABSOLUTE: 0.7 K/CU MM
EOSINOPHILS RELATIVE PERCENT: 8.3 % (ref 0–3)
GFR AFRICAN AMERICAN: 35 ML/MIN/1.73M2
GFR NON-AFRICAN AMERICAN: 29 ML/MIN/1.73M2
GLUCOSE BLD-MCNC: 128 MG/DL (ref 70–99)
GLUCOSE BLD-MCNC: 153 MG/DL (ref 70–99)
GLUCOSE BLD-MCNC: 206 MG/DL (ref 70–99)
GLUCOSE BLD-MCNC: 222 MG/DL (ref 70–99)
GLUCOSE BLD-MCNC: 347 MG/DL (ref 70–99)
GLUCOSE BLD-MCNC: 79 MG/DL (ref 70–99)
HCT VFR BLD CALC: 30.8 % (ref 42–52)
HEMOGLOBIN: 8.7 GM/DL (ref 13.5–18)
IMMATURE NEUTROPHIL %: 0.4 % (ref 0–0.43)
LYMPHOCYTES ABSOLUTE: 1.9 K/CU MM
LYMPHOCYTES RELATIVE PERCENT: 24.8 % (ref 24–44)
MAGNESIUM: 2 MG/DL (ref 1.8–2.4)
MCH RBC QN AUTO: 29.8 PG (ref 27–31)
MCHC RBC AUTO-ENTMCNC: 28.2 % (ref 32–36)
MCV RBC AUTO: 105.5 FL (ref 78–100)
MONOCYTES ABSOLUTE: 0.7 K/CU MM
MONOCYTES RELATIVE PERCENT: 8.5 % (ref 0–4)
NUCLEATED RBC %: 0 %
PDW BLD-RTO: 16.1 % (ref 11.7–14.9)
PHOSPHORUS: 5.2 MG/DL (ref 2.5–4.9)
PLATELET # BLD: 304 K/CU MM (ref 140–440)
PMV BLD AUTO: 10.4 FL (ref 7.5–11.1)
POTASSIUM SERPL-SCNC: 5 MMOL/L (ref 3.5–5.1)
RBC # BLD: 2.92 M/CU MM (ref 4.6–6.2)
SEGMENTED NEUTROPHILS ABSOLUTE COUNT: 4.5 K/CU MM
SEGMENTED NEUTROPHILS RELATIVE PERCENT: 57.4 % (ref 36–66)
SODIUM BLD-SCNC: 139 MMOL/L (ref 135–145)
TOTAL IMMATURE NEUTOROPHIL: 0.03 K/CU MM
TOTAL NUCLEATED RBC: 0 K/CU MM
TOTAL PROTEIN: 6.2 GM/DL (ref 6.4–8.2)
WBC # BLD: 7.8 K/CU MM (ref 4–10.5)

## 2022-04-04 PROCEDURE — 1280000000 HC REHAB R&B

## 2022-04-04 PROCEDURE — 6370000000 HC RX 637 (ALT 250 FOR IP): Performed by: PHYSICAL MEDICINE & REHABILITATION

## 2022-04-04 PROCEDURE — 6360000002 HC RX W HCPCS: Performed by: INTERNAL MEDICINE

## 2022-04-04 PROCEDURE — 6370000000 HC RX 637 (ALT 250 FOR IP): Performed by: INTERNAL MEDICINE

## 2022-04-04 PROCEDURE — 97542 WHEELCHAIR MNGMENT TRAINING: CPT

## 2022-04-04 PROCEDURE — 97130 THER IVNTJ EA ADDL 15 MIN: CPT

## 2022-04-04 PROCEDURE — 82140 ASSAY OF AMMONIA: CPT

## 2022-04-04 PROCEDURE — 82962 GLUCOSE BLOOD TEST: CPT

## 2022-04-04 PROCEDURE — 94761 N-INVAS EAR/PLS OXIMETRY MLT: CPT

## 2022-04-04 PROCEDURE — 97535 SELF CARE MNGMENT TRAINING: CPT

## 2022-04-04 PROCEDURE — 84100 ASSAY OF PHOSPHORUS: CPT

## 2022-04-04 PROCEDURE — 2580000003 HC RX 258: Performed by: INTERNAL MEDICINE

## 2022-04-04 PROCEDURE — 97129 THER IVNTJ 1ST 15 MIN: CPT

## 2022-04-04 PROCEDURE — 36415 COLL VENOUS BLD VENIPUNCTURE: CPT

## 2022-04-04 PROCEDURE — 83735 ASSAY OF MAGNESIUM: CPT

## 2022-04-04 PROCEDURE — 97530 THERAPEUTIC ACTIVITIES: CPT

## 2022-04-04 PROCEDURE — 80053 COMPREHEN METABOLIC PANEL: CPT

## 2022-04-04 PROCEDURE — 97116 GAIT TRAINING THERAPY: CPT

## 2022-04-04 PROCEDURE — 85025 COMPLETE CBC W/AUTO DIFF WBC: CPT

## 2022-04-04 PROCEDURE — 97110 THERAPEUTIC EXERCISES: CPT

## 2022-04-04 RX ORDER — TORSEMIDE 20 MG/1
20 TABLET ORAL DAILY
Status: DISCONTINUED | OUTPATIENT
Start: 2022-04-04 | End: 2022-04-06

## 2022-04-04 RX ADMIN — ALLOPURINOL 100 MG: 100 TABLET ORAL at 07:33

## 2022-04-04 RX ADMIN — SODIUM CHLORIDE, PRESERVATIVE FREE 10 ML: 5 INJECTION INTRAVENOUS at 21:03

## 2022-04-04 RX ADMIN — INSULIN LISPRO 4 UNITS: 100 INJECTION, SOLUTION INTRAVENOUS; SUBCUTANEOUS at 11:08

## 2022-04-04 RX ADMIN — ACETAMINOPHEN 650 MG: 325 TABLET ORAL at 07:33

## 2022-04-04 RX ADMIN — ALOGLIPTIN 6.25 MG: 6.25 TABLET, FILM COATED ORAL at 07:36

## 2022-04-04 RX ADMIN — INSULIN LISPRO 8 UNITS: 100 INJECTION, SOLUTION INTRAVENOUS; SUBCUTANEOUS at 17:15

## 2022-04-04 RX ADMIN — DOXYCYCLINE HYCLATE 100 MG: 100 TABLET, FILM COATED ORAL at 21:03

## 2022-04-04 RX ADMIN — ASPIRIN 81 MG 81 MG: 81 TABLET ORAL at 07:33

## 2022-04-04 RX ADMIN — HEPARIN SODIUM 5000 UNITS: 5000 INJECTION INTRAVENOUS; SUBCUTANEOUS at 13:22

## 2022-04-04 RX ADMIN — PREGABALIN 50 MG: 50 CAPSULE ORAL at 07:33

## 2022-04-04 RX ADMIN — HEPARIN SODIUM 5000 UNITS: 5000 INJECTION INTRAVENOUS; SUBCUTANEOUS at 06:20

## 2022-04-04 RX ADMIN — SODIUM CHLORIDE, PRESERVATIVE FREE 10 ML: 5 INJECTION INTRAVENOUS at 07:34

## 2022-04-04 RX ADMIN — CARVEDILOL 3.12 MG: 6.25 TABLET, FILM COATED ORAL at 07:33

## 2022-04-04 RX ADMIN — MIRTAZAPINE 15 MG: 15 TABLET, ORALLY DISINTEGRATING ORAL at 21:03

## 2022-04-04 RX ADMIN — LEVOTHYROXINE SODIUM 50 MCG: 0.05 TABLET ORAL at 06:21

## 2022-04-04 RX ADMIN — CHLORTHALIDONE 25 MG: 25 TABLET ORAL at 07:33

## 2022-04-04 RX ADMIN — TORSEMIDE 20 MG: 20 TABLET ORAL at 16:45

## 2022-04-04 RX ADMIN — HEPARIN SODIUM 5000 UNITS: 5000 INJECTION INTRAVENOUS; SUBCUTANEOUS at 21:03

## 2022-04-04 RX ADMIN — PREGABALIN 50 MG: 50 CAPSULE ORAL at 21:03

## 2022-04-04 RX ADMIN — CARVEDILOL 3.12 MG: 6.25 TABLET, FILM COATED ORAL at 16:52

## 2022-04-04 RX ADMIN — ATORVASTATIN CALCIUM 40 MG: 40 TABLET, FILM COATED ORAL at 21:03

## 2022-04-04 RX ADMIN — DOXYCYCLINE HYCLATE 100 MG: 100 TABLET, FILM COATED ORAL at 07:33

## 2022-04-04 RX ADMIN — GLIPIZIDE 10 MG: 5 TABLET ORAL at 16:45

## 2022-04-04 ASSESSMENT — PAIN SCALES - GENERAL
PAINLEVEL_OUTOF10: 0
PAINLEVEL_OUTOF10: 0
PAINLEVEL_OUTOF10: 8

## 2022-04-04 ASSESSMENT — PAIN DESCRIPTION - LOCATION: LOCATION: HIP

## 2022-04-04 ASSESSMENT — PAIN DESCRIPTION - ORIENTATION: ORIENTATION: LEFT

## 2022-04-04 ASSESSMENT — PAIN DESCRIPTION - DESCRIPTORS: DESCRIPTORS: ACHING

## 2022-04-04 ASSESSMENT — PAIN DESCRIPTION - PAIN TYPE: TYPE: ACUTE PAIN

## 2022-04-04 NOTE — PROGRESS NOTES
Physical Therapy  . [x] daily progress note       [x] discharge       Patient Name:  Amaya Crouch   :  1940 MRN: [id number]  Room:  [address] [place], [place] [address] Date of Admission: 3/30/2022  Rehabilitation Diagnosis:   Metabolic encephalopathy [R49.19]  Metabolic encephalopathy [D68.82]       Date 2022       Day of ARU Week:  6   Time IN//930   Individual Tx Minutes 60   Group Tx Minutes    Co-Treat Minutes    Concurrent Tx Minutes    TOTAL Tx Time Mins 60   Variance Time    Variance Time []   Refusal due to:     []   Medical hold/reason:    []   Illness   []   Off Unit for test/procedure  []   Extra time needed to complete task  []   Therapeutic need  []   Other (specify):   Restrictions Restrictions/Precautions  Restrictions/Precautions: General Precautions,Fall Risk,Contact Precautions (suprapubic catheter)      Interdisciplinary communication [x]   Cleared for therapy per nursing     [x]   RN notified about issues during session: confusion  []   RN updated on pt performance  []   Spoke with   []   Spoke with OT  []   Spoke with MD  []   Other:    Subjective observations and cognitive status: Pt in bed, agreeable to therapy. Tells therapist he has been confused this morning and relates that earlier he thought it was afternoon. As pt relating story he repeats himself often and loses train of thought. At one point pt state he was trying to get out to his car to move it for his wife(she has been  for some time). Pt became evelina tired at end of session, vitals WFL. Pt had a bout of low BS overnight but was [facility] upon start of session.  Nursing informed of pt response to treatment   Pain level/location:  0  /10       Location:    Discharge recommendations  Anticipated discharge date:    Destination: []home alone   []home alone with assist PRN     [x] home w/ family but alone many hours a day      [] Continuous supervision  []SNF    [] Assisted living     [] Other:  Continued therapy: [x][facility] PT []OUTPATIENT PT   [] No Further PT  []SNF PT  Caregiver training recommended: []Yes  [] No   Equipment needs: tbd     PT IRF-LESTER scores and goals for discharge assessment:   Roll Left and Right  Assistance Needed: Substantial/maximal assistance  Comment: max assist with rails  CARE Score: 2  Discharge Goal: Independent    Sit to Lying  Assistance Needed: Substantial/maximal assistance  Comment: max assist for B LE, improved control of trunk  CARE Score: 2  Discharge Goal: Independent    Lying to Sitting on Side of Bed  Assistance Needed: Partial/moderate assistance  Comment: mod assist for trunk  CARE Score: 3  Discharge Goal: Independent    Sit to Stand  Assistance Needed: Partial/moderate assistance  Comment: varies min to mod assist dependent on height of surface  CARE Score: 3  Discharge Goal: Independent    Chair/Bed-to-Chair Transfer  Assistance Needed: Partial/moderate assistance  Comment: mod assist with 2ww and max time allowed  CARE Score: 3  Discharge Goal: Independent        Car Transfer  Comment: unable to attempt due to pt fatiguing  Reason if not Attempted: Not attempted due to medical condition or safety concerns  CARE Score: 88  Discharge Goal: Supervision or touching assistance    Walk 10 Feet?   Walk 10 Feet?: Yes    1 Step  Reason if not Attempted: Not attempted due to medical condition or safety concerns    Picking Up Object  Assistance Needed: Substantial/maximal assistance  Comment: started with mod assist as he took off his L hand(due to leaning to R), and able to grasp object, then as pt bringing it up, lost balance posterior and needed max assist to recover  CARE Score: 2  Discharge Goal: Supervision or touching assistance    Wheelchair Ability  Uses a Wheelchair and/or Scooter?: No    Wheel 50 Feet with Two Turns  Assistance Needed: Partial/moderate assistance  Comment: 30' max distance  CARE Score: 3                   Walking Ability  Does the Patient Walk?: Yes    Walk 10 Feet  Assistance Needed: Partial/moderate assistance  Comment: min assist with 2ww and max time allowed, leaning to R with cues for L weightshift and R step length  CARE Score: 3  Discharge Goal: Independent    Walk 50 Feet with Two Turns  Reason if not Attempted: Not attempted due to medical condition or safety concerns  CARE Score: 88  Discharge Goal: Supervision or touching assistance    Walk 150 Feet  Reason if not Attempted: Not attempted due to medical condition or safety concerns  CARE Score: 88  Discharge Goal: Supervision or touching assistance    Walking 10 Feet on Uneven Surfaces  Reason if not Attempted: Not attempted due to medical condition or safety concerns  CARE Score: 88  Discharge Goal: Supervision or touching assistance    1 Step (Curb)  Reason if not Attempted: Not attempted due to medical condition or safety concerns  CARE Score: 88  Discharge Goal: Supervision or touching assistance    4 Steps  Reason if not Attempted: Not attempted due to medical condition or safety concerns  CARE Score: 88  Discharge Goal: Supervision or touching assistance    12 Steps  Reason if not Attempted: Not applicable  CARE Score: 9  Discharge Goal: Not Applicable      Additional Therapeutic activities/exercises completed this date:     []   Nu-step:  Time:        Level:         #Steps:       []   Rebounder:    []  Seated     []  Standing        []   Balance training         []   Postural training    []   Supine ther ex (reps/sets):     []   Seated ther ex (reps/sets):     []   Standing ther ex (reps/sets):     []   Other: Toileting activity completed with    [x]   Other:activity tolerance and LE ex via pushing w/c backward x 50' with very slow processing. Comments: limited ability to reassess this date due to confusion, pt falling asleep.  In all balance and mobility pt noted to be leaning to R and to have decreased amplitude of movement on LLE/UE     Patient/Caregiver Education and Training:   []   Role of PT  []   Education about Dx  []   Use of call light for assist   []   HEP provided and explained   [x]   Treatment plan reviewed  []   Home safety  []   Wheelchair mobility/management   []   Body mechanics  [x]   Bed Mobility/Transfer technique  [x]   Gait technique/sequencing  [x]   Proper use of assistive device/adaptive equipment  []   Stair training/Advanced mobility safety and technique  []   Reinforced patient's precautions/mobility while maintaining precautions  []   Postural awareness  []   Family/caregiver training  []   Progress was updated and reviewed in Rehabtracker with patient and/or family this date. []   Other:    Treatment Plan for Next Session: progress transfers and gait, activity tolerance, LE ex    Assessment: This pt demonstrated a positive   response to today's treatment as evidenced by improved gait despite lethargy and confusion The patient is making  progress toward established goals as evidenced by QI scores.        Treatment/Activity Tolerance:   [] Tolerated treatment with no adverse effects    [x] Patient limited by fatigue  [] Patient limited by pain   [] Patient limited by medical complications:    [] Adverse reaction to Tx:   [] Significant change in status    Safety:       [x]  bed alarm set    []  chair alarm set    []  Pt refused alarms                []  Telesitter activated      [x]  Gait belt used during tx session      []other:       Number of Minutes/Billable Intervention  Gait Training 15   Therapeutic Exercise    Neuro Re-Ed    Therapeutic Activity 30   Wheelchair Propulsion 15   Group    Other:    TOTAL 60     Social History  Social/Functional History  Lives With: Son  Type of Home: House  Home Layout: One level  Home Access: Ramped entrance  Bathroom Shower/Tub: Tub/Shower unit,Shower chair with back  Bathroom Toilet: Standard  Bathroom Equipment: Grab bars in shower,Shower chair  Bathroom Accessibility: [place] accessible (states once he is in bathroom he uses countertops and grab bars, but pt not able to clarify if this is because there is little room or just preference)  Home Equipment: Cane,4 wheeled walker,Rolling walker (pt thinks there may be a BSC from his wife in storage, but unsure)  ADL Assistance: Independent  Homemaking Assistance: Independent  Homemaking Responsibilities: Yes  Meal Prep Responsibility: Secondary  Laundry Responsibility: Primary  Cleaning Responsibility: Secondary  Bill Paying/Finance Responsibility: Primary  Shopping Responsibility: Secondary  Health Care Management: Primary (has pill box with alarms per pt. until recently [address] would set up, but once [address] stopped he managed on own)  Ambulation Assistance: Independent (mod I with 4ww for up to 150'. pt used electronic carts at stores when able)  Transfer Assistance: Independent  Active : No  Patient's  Info: 3 sons assist in driving. Mode of Transportation: Car,SUV  Occupation: Retired  Type of occupation: Retired from [address] [place] [address] Street: TV, very little reading, no pets, doctors, son manages groceries,  Meds are managed via pill dispenser that son manages. Pt occasionally writes checks and some auto pay  Additional Comments: pt sleeps in flat bed. Pt has fallen >4 times in past year. Pt attributes many to low BS. No injury    Objective                                                                                    Goals:  (Update in navigator)   :   Long term goals  Time Frame for Long term goals : 12-14 days  Long term goal 1: Pt will perform bed mobility with mod I  Long term goal 2: Pt will perform sit to stand and pivot transfers with mod I, car transfers with CGA  Long term goal 3: Pt will ambulate 10' with 2ww with mod I, up to 150' with supervision including uneven surfaces  Long term goal 4: Pt will ascend/descend curb step with 2ww and up to 4 steps with B rails with supervision  Long term goal 5: Pt will  object from floor with 2ww and reacher with supervision:        Plan of Care                                                                              Times per week: 5 days per week for a minimum of 60 minutes/day plus group as appropriate for 60 minutes.   Treatment to include Current Treatment Recommendations: Strengthening,ROM,Balance Training,Functional Mobility Training,Transfer Training,ADL/Self-care Training,Gait Training,Patient/Caregiver Education & Training,IADL Training,Stair training,Equipment Evaluation, Education, & procurement,Neuromuscular Re-education,Pain Management,Home Exercise [employer] Training,Safety Education & Training,Cognitive/Perceptual Training,Cognitive Reorientation    Electronically signed by   Lennox Ruffing, PT,   4/4/2022, 11:18 AM

## 2022-04-04 NOTE — PATIENT CARE CONFERENCE
ACUTE REHAB TEAM CONFERENCE SUMMARY   [facility]    NAME: Alexander Ortiz  : 1940 ADMIT DATE: 3/30/2022    Rehab Admitting Dx: Metabolic encephalopathy [C33.51]  Metabolic encephalopathy [L84.04]  Patient Comorbid Conditions: Active Hospital Problems    Diagnosis Date Noted    History of prostate cancer [Z85.46]     Cigarette nicotine dependence without complication [O94.805]     Metabolic encephalopathy [Y70.18] 2022    Chronic kidney disease, stage IV (severe) ([facility] [N18.4] 2022    Sepsis due to urinary tract infection ([facility] [address] [A41.9, N39.0] 2022     Date: 2022    CASE MANAGEMENT  Current issues/needs regarding patient and family discharge status: Patient lives with son Kim Licona in a 1L, ramped house. Additional support includes local sons Ethyl Peon and Ritesh Nails. Patient reports having a R, RW, SC, C, and GB in shower PTA. Plan is to discharge home with son.     PHYSICAL THERAPY (Updated in QI)        Long term goals  Time Frame for Long term goals : 12-14 days  Long term goal 1: Pt will perform bed mobility with mod I  Long term goal 2: Pt will perform sit to stand and pivot transfers with mod I, car transfers with CGA  Long term goal 3: Pt will ambulate 10' with 2ww with mod I, up to 150' with supervision including uneven surfaces  Long term goal 4: Pt will ascend/descend curb step with 2ww and up to 4 steps with B rails with supervision  Long term goal 5: Pt will  object from floor with 2ww and reacher with supervision    Impairments/deficits, barriers: confusion, lethargy, activity tolerance  Body structures, Functions, Activity limitations: Decreased functional mobility ,Decreased strength,Decreased endurance,Decreased posture,Decreased safe awareness,Decreased sensation,Decreased high-level IADLs,Decreased ADL status,Decreased cognition,Decreased balance,Increased pain     Prognosis: Guarded,Fair (pending improved level of alertness and cognition as well as pain mgmt)  Decision Making: High Complexity  Clinical Presentation: unpredictable characteristics  Equipment needed at discharge: possible w/c      PT IRF-LESTER scores since initial assessment  Bed Mobility:   Sit to Lying  Assistance Needed: Substantial/maximal assistance  Comment: max assist for B LE, improved control of trunk  CARE Score: 2  Discharge Goal: Independent    Roll Left and Right  Assistance Needed: Substantial/maximal assistance  Comment: max assist with rails  CARE Score: 2  Discharge Goal: Independent    Lying to Sitting on Side of Bed  Assistance Needed: Partial/moderate assistance  Comment: mod assist for trunk  CARE Score: 3  Discharge Goal: Independent    Transfers:    Sit to Stand  Assistance Needed: Partial/moderate assistance  Comment: varies min to mod assist dependent on height of surface  CARE Score: 3  Discharge Goal: Independent    Chair/Bed-to-Chair Transfer  Assistance Needed: Partial/moderate assistance  Comment: mod assist with 2ww and max time allowed  CARE Score: 3  Discharge Goal: Independent        Car Transfer  Reason if not Attempted: Not attempted due to medical condition or safety concerns  CARE Score: 88  Discharge Goal: Supervision or touching assistance    Ambulation:    Walking Ability  Does the Patient Walk?: Yes     Walk 10 Feet  Assistance Needed: Partial/moderate assistance  Comment: min assist with 2ww and max time allowed, leaning to R with cues for L weightshift and R step length  CARE Score: 3  Discharge Goal: Independent     Walk 50 Feet with Two Turns  Reason if not Attempted: Not attempted due to medical condition or safety concerns  CARE Score: 88  Discharge Goal: Supervision or touching assistance     Walk 150 Feet  Reason if not Attempted: Not attempted due to medical condition or safety concerns  CARE Score: 88  Discharge Goal: Supervision or touching assistance     Walking 10 Feet on Uneven Surfaces  Reason if not Attempted: Not attempted due to medical condition or safety concerns  CARE Score: 88  Discharge Goal: Supervision or touching assistance     1 Step (Curb)  Reason if not Attempted: Not attempted due to medical condition or safety concerns  CARE Score: 88  Discharge Goal: Supervision or touching assistance     4 Steps  Reason if not Attempted: Not attempted due to medical condition or safety concerns  CARE Score: 88  Discharge Goal: Supervision or touching assistance     12 Steps  Reason if not Attempted: Not applicable  CARE Score: 9  Discharge Goal: Not Applicable           Wheelchair:  w/c Ability: Wheelchair Ability  Uses a Wheelchair and/or Scooter?: No    Wheel 50 Feet with Two Turns  Assistance Needed: Partial/moderate assistance  Comment: 30' max distance  CARE Score: 3                   Balance:        Object: Picking Up Object  Assistance Needed: Substantial/maximal assistance  Comment: started with mod assist as he took off his L hand(due to leaning to R), and able to grasp object, then as pt bringing it up, lost balance posterior and needed max assist to recover  CARE Score: 2  Discharge Goal: Supervision or touching assistance    Fall Risk: [x]  Yes  []  No    OCCUPATIONAL THERAPY  (Updated in QI)  Short term goals  Time Frame for Short term goals: STGs=LTGs :   Long term goals  Time Frame for Long term goals : 10-14 days or until d/c  Long term goal 1: Pt will complete grooming tasks Ind seated  Long term goal 2: Pt will complete total body bathing c S  Long term goal 3: Pt will complete UB dressing c setup  Long term goal 4: Pt will complete LB dressing c supervision using AE PRN  Long term goal 5: Pt will doff/don footwear c supervision using AE PRN  Long term goals 6: Pt will complete toileting c S  Long term goal 7: Pt will complete functional transfers (bed, chair, toilet, shower) c DME PRN and S  Long term goal 8: Pt will perform therex/therax to facilitate increased strength/endurance/ax tolerance (c emphasis on dynamic assistance  Comment: CGA c RW requiring cues for safety  CARE Score: 4  Discharge Goal: Supervision or touching assistance      Impairments/deficits, barriers:  Decreased balance, endurance, strength, fluctuating confusion/agitation, chronic mccrary with concerns for proper handling (pt with recurrent UTI)  Assessment  Performance deficits / Impairments: Decreased functional mobility ,Decreased ADL status,Decreased ROM,Decreased strength,Decreased cognition,Decreased endurance,Decreased balance,Decreased sensation,Decreased high-level IADLs,Decreased posture  Decision Making: High Complexity  REQUIRES OT FOLLOW UP: Yes  Equipment needed at discharge: Need to confirm with family if there is BSC/grab bars      COGNITIVE FUNCTION/SPEECH THERAPY (AS INDICATED)  LTG         Duration/Frequency of Treatment  Duration/Frequency of Treatment: 1x/week x 1 week  30 mins min                     Short-term Goals  Timeframe for Short-term Goals: 3x/week x1 week 30 mins min  LTG: Pt will improve recall of learned safety for safe return home with support. Goal 1: Complete cognitive assessment when able to remain alert and establish goals as appropriate. Completed 4/1/22 with BCAT score 35/50  Goal 2: Pt will improve problem solving for safety for given situations with min cues and 80% acc  Partially met, continue--variable performance due to lethargy; much improved when alert and meeting goals  Goal 3: Pt will recall learned safety strategies within home environment with min cues with 80% acc. Partially met, continue--variable performance due to lethargy; improved when alert and meeting goals                      Ongoing impairments or deficits or barriers: mild cognitive impairments, reduced alertness and attn  Areas where progress has been made:  Strategies that improve performance:verbal and tactile cues.      Nursing Current Medical Status:   [] Is continent of bowel and bladder     [] Is incontinent of bowel and bladder    [] Has had an adequate number of bowel movements   [] Urinates with no urinary retention >300ml in bladder   [] Targeting bladder protocol with mccrary removal   [] Maintaining O2 SATs at 92% or greater   [] Has pain managed while on ARU         [] Has had no skin breakdown while on ARU   [] Has improved skin integrity via wound measurements   [] Has no signs/symptoms of infection at the wound site   [x] Pressure wounds Stage/Location: Stage 2 buttocks   [x] Arrived on unit with pressure wound  [] Has been free from injury during hospitalization   [] Has experienced a fall during hospitalization  [] Ongoing education with patient/family with understanding demonstrated for:  [] Receives IV Fluids  [x] Other: blood sugars being managed      NUTRITION  Weight: 200 lb 13.4 oz (91.1 kg) / Body mass index is 30.54 kg/m². Current diet: ADULT ORAL NUTRITION SUPPLEMENT; Breakfast; Fortified Pudding Oral Supplement  ADULT ORAL NUTRITION SUPPLEMENT; Lunch, Dinner; Frozen Oral Supplement  ADULT DIET; Regular; 5 carb choices (75 gm/meal); Low Potassium (Less than 3000 mg/day)  Intake: Improving intake during stay, recent meals %      Medical improvements/barriers: fluctuating activity tolerance, fluctuating blood sugars, cognition, improved ambulation, use leg bag when up        Team goals for next treatment period/Intervention for current barriers:   [x] Pt will increase activity tolerance for daily tasks.   [x] Pt will improve bed mobility with reduced assist.  [x] Pt will improve safety in fx tasks with reduced cues/assist  [x] Pt will improve transfers with reduced assist  [x] Pt will improve toileting with reduced assist  [x] Pt will improve ADL's with use of adaptive equipment with reduced assist  [] Pt will improve pain mgmt for maximum participation in tx program  [] Pt will improve communication to get basic needs met on unit  [] Pt will improve swallowing for safe diet advancement with use of strategies  []  Plan for OT     [x]  Annette Giordano, SLP    []  Fidencio Scott, RUDDY   [] Carlos A Scruggs, RUDDY      [x] Garret Webber Mgr   []Tammy Ruiz,      [x] Rogelio Joshi RN   [] Tadeo Milian, ISHMAEL    [] Luly Louis RN    [] Sivakumar Rodríguez RN    [] Rom Gonzalez RN   [] Antonia Martinez RN   [] Junior Saez RN Nurse Mgr     I have led this Team Conference and agree with the plan, Swetha Fuller MD, 4/5/2022, 12:52 PM  Goals have been updated to reflect recent status.     Team conference note transcribed this date by: Alcides Bowers MA, [place], Therapy Coordinator

## 2022-04-04 NOTE — PROGRESS NOTES
Nephrology Progress Note  4/4/2022 3:46 PM  Subjective: Interval History: Jaxon Keith is a 78 y.o. male with Some of the weakness and fatigue resting in bed today        Data:   Scheduled Meds:   insulin lispro  0-6 Units SubCUTAneous Nightly    glipiZIDE  10 mg Oral BID AC    [Held by provider] insulin glargine  10 Units SubCUTAneous Nightly    heparin (porcine)  5,000 Units SubCUTAneous 3 times per day    sodium chloride flush  5-40 mL IntraVENous 2 times per day    allopurinol  100 mg Oral Daily    alogliptin  6.25 mg Oral Daily    aspirin  81 mg Oral Daily    atorvastatin  40 mg Oral Nightly    carvedilol  3.125 mg Oral BID WC    chlorthalidone  25 mg Oral Daily    insulin lispro  0-12 Units SubCUTAneous TID WC    levothyroxine  50 mcg Oral Daily    mirtazapine  15 mg Oral Nightly    pregabalin  50 mg Oral BID    doxycycline hyclate  100 mg Oral 2 times per day     Continuous Infusions:   dextrose           CBC   Recent Labs     04/04/22  1300   WBC 7.8   HGB 8.7*   HCT 30.8*         BMP   Recent Labs     04/04/22  1300      K 5.0      CO2 19*   PHOS 5.2*   BUN 54*   CREATININE 2.2*     Hepatic:   Recent Labs     04/04/22  1300   AST 14*   ALT 12   BILITOT 0.2   ALKPHOS 108     Troponin: No results for input(s): TROPONINI in the last 72 hours. BNP: No results for input(s): BNP in the last 72 hours. Lipids: No results for input(s): CHOL, HDL in the last 72 hours. Invalid input(s): LDLCALCU  ABGs: No results found for: PHART, PO2ART, VNE4LHG  INR: No results for input(s): INR in the last 72 hours.   Renal Labs  Albumin:    Lab Results   Component Value Date    LABALBU 3.5 04/04/2022     Calcium:    Lab Results   Component Value Date    CALCIUM 9.6 04/04/2022     Phosphorus:    Lab Results   Component Value Date    PHOS 5.2 04/04/2022     U/A:    Lab Results   Component Value Date    NITRU NEGATIVE 03/29/2022    NITRU NEGATIVE 03/23/2013    COLORU YELLOW 03/29/2022 WBCUA NONE SEEN 03/29/2022    RBCUA 56 03/29/2022    MUCUS RARE 03/29/2022    TRICHOMONAS NONE SEEN 03/29/2022    YEAST MANY 03/29/2022    BACTERIA NEGATIVE 03/29/2022    CLARITYU CLOUDY 03/29/2022    SPECGRAV 1.015 03/29/2022    UROBILINOGEN 0.2 03/29/2022    BILIRUBINUR NEGATIVE 03/29/2022    BLOODU LARGE 03/29/2022    GLUCOSEU 1,000 03/23/2013    KETUA NEGATIVE 03/29/2022    AMORPHOUS RARE 11/16/2021     ABG:  No results found for: PHART, MSK7HXO, PO2ART, MAP7HEL, BEART, THGBART, UXT5JSD, J4PSWUDI  HgBA1c:    Lab Results   Component Value Date    LABA1C 9.2 03/22/2022     Microalbumen/Creatinine ratio:  No components found for: RUCREAT  TSH:  No results found for: TSH  IRON:    Lab Results   Component Value Date    IRON 15 03/22/2022     Iron Saturation:  No components found for: PERCENTFE  TIBC:    Lab Results   Component Value Date    TIBC 214 03/22/2022     FERRITIN:    Lab Results   Component Value Date    FERRITIN 352 03/22/2022     RPR:  No results found for: RPR  MITALI:  No results found for: ANATITER, MITALI  24 Hour Urine for Creatinine Clearance:  No components found for: CREAT4, UHRS10, UTV10      Objective:   I/O: 04/03 0701 - 04/04 0700  In: 360 [P.O.:360]  Out: 1250 [Urine:1250]  I/O last 3 completed shifts: In: 1200 [P.O.:1200]  Out: 2050 [Urine:2050]  I/O this shift: In: 480 [P.O.:480]  Out: -   Vitals: BP (!) 159/69   Pulse 76   Temp 97.5 °F (36.4 °C)   Resp 18   Ht 5' 8\" (1.727 m)   Wt 201 lb 1 oz (91.2 kg)   SpO2 100%   BMI 30.57 kg/m²  {  General appearance: awake weak  HEENT: Head: Normal, normocephalic, atraumatic. Neck: supple, symmetrical, trachea midline  Lungs: diminished breath sounds bilaterally  Heart: S1, S2 normal  Abdomen: abnormal findings:  soft nt  Extremities: edema trace  Neurologic: Mental status: alertness: alert        Assessment and Plan:      IMP:    1.   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

## 2022-04-04 NOTE — PROGRESS NOTES
Progress Note( Dr. Scot Mack)  4/4/2022  Subjective:   Admit Date: 3/30/2022  PCP: Megan Gaston MD    Admitted For : Patient was initially admitted to medical floor for sepsis from UTI and generalized  weakness    Consulted For: Better control of blood glucose    Interval History: Patient was transferred from medical floor on 3/30/2022 and was admitted to acute rehab unit for rehabilitation and physical therapy  Blood sugar control better now        Denies any chest pains,   Denies SOB . Denies nausea or vomiting. No new bowel or bladder symptoms.        Intake/Output Summary (Last 24 hours) at 4/4/2022 1526  Last data filed at 4/4/2022 0923  Gross per 24 hour   Intake 840 ml   Output 1250 ml   Net -410 ml       DATA    CBC:   Recent Labs     04/04/22  1300   WBC 7.8   HGB 8.7*       CMP:  Recent Labs     04/04/22  1300      K 5.0      CO2 19*   BUN 54*   CREATININE 2.2*   CALCIUM 9.6   PROT 6.2*   LABALBU 3.5   BILITOT 0.2   ALKPHOS 108   AST 14*   ALT 12     Lipids: No results found for: CHOL, HDL, TRIG  Glucose:  Recent Labs     04/04/22  0237 04/04/22  0720 04/04/22  1102   POCGLU 79 128* 206*     [id number]:  Lab Results   Component Value Date    LABA1C 9.2 03/22/2022     High Sensitivity TSH:   Lab Results   Component Value Date    TSHHS 6.190 03/25/2022     Free T3: No results found for: FT3  Free T4:  Lab Results   Component Value Date    T4FREE 1.15 03/27/2022       No orders to display        Scheduled Medicines   Medications:    insulin lispro  0-6 Units SubCUTAneous Nightly    glipiZIDE  10 mg Oral BID AC    [Held by provider] insulin glargine  10 Units SubCUTAneous Nightly    heparin (porcine)  5,000 Units SubCUTAneous 3 times per day    sodium chloride flush  5-40 mL IntraVENous 2 times per day    allopurinol  100 mg Oral Daily    alogliptin  6.25 mg Oral Daily    aspirin  81 mg Oral Daily    atorvastatin  40 mg Oral Nightly    carvedilol  3.125 mg Oral BID WC  chlorthalidone  25 mg Oral Daily    insulin lispro  0-12 Units SubCUTAneous TID WC    levothyroxine  50 mcg Oral Daily    mirtazapine  15 mg Oral Nightly    pregabalin  50 mg Oral BID    doxycycline hyclate  100 mg Oral 2 times per day      Infusions:    dextrose           Objective:   Vitals: /64   Pulse 78   Temp 98.2 °F (36.8 °C) (Oral)   Resp 18   Ht 5' 8\" (1.727 m)   Wt 201 lb 1 oz (91.2 kg)   SpO2 100%   BMI 30.57 kg/m²   General appearance: alert and cooperative with exam  Neck: no JVD or bruit  Thyroid : Normal lobes   Lungs: Has Vesicular Breath sounds   Heart:  regular rate and rhythm  Abdomen: soft, non-tender; bowel sounds normal; no masses,  no organomegaly has Surapubic Catheter   Musculoskeletal: Normal  Extremities: extremities normal, , no edema  Neurologic:  Awake, alert, oriented to name, place and time. Cranial nerves II-XII are grossly intact. Motor weakness . Sensory is intact. ,  and gait is abnormal.unstable     Assessment:     Patient Active Problem List:     Gait disturbance     Generalized weakness     Diabetes mellitus (HCC)     Osteoarthritis     Anemia of chronic disorder     Infected implanted bladder sphincter cuff     Escherichia coli urinary tract infection     Hypertension     Sepsis ([address]     Type 2 diabetes mellitus with hypoglycemia without coma ([address]     UTI (urinary tract infection) due to urinary indwelling catheter ([facility])     Hyperkalemia     Acute kidney failure (HCC)     Leg weakness, bilateral     Type 2 diabetes mellitus with neurological manifestations, uncontrolled ([address]     Complicated UTI (urinary tract infection)     Essential hypertension     Severe muscle deconditioning     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 (HCC)     Frequent falls     Chronic kidney disease, stage 3a ([address]     Uncontrolled type 2 diabetes mellitus with peripheral neuropathy (HCC)     Prostate cancer ([address]     Suprapubic catheter ([address]     Sepsis secondary to UTI

## 2022-04-04 NOTE — PROGRESS NOTES
[employer]  Additional Comments: pt sleeps in flat bed. Pt has fallen >4 times in past year. Pt attributes many to low BS. No injury      Date of Admit: 3/30/2022  Room #: 1025/1025-A     ST Number of Minutes/Billable Intervention  Cog/Memory Deficits 40    Aphasia/Language     Dysarthria/Speech     Apraxia/Speech     Dysphagia/Swallowing     Group     Other    TOTAL Minutes Billed  40    Variance     +10 end of the day extra time needed     Date: 4/4/2022  Day of ARU Week:  6     Attempted to see pt 10:45--unable to remain alert even with sternal rub. Responsive but unable to remain awake. NSG reporting blood sugar issues  SLP Individual Minutes  Time In: 1540  Time Out: 1620  Minutes: 40         Variance/Reason:  [] Refusal due to   [] Medical hold/reason  [] Illness   [] Off Unit for test/procedure  [] Extra time needed to complete task  [] Other (specify)    Activity completed: Problem solving and memory for situations presented around the room. Pain: denies  Current Diet: ADULT ORAL NUTRITION SUPPLEMENT; Breakfast; Fortified Pudding Oral Supplement  ADULT ORAL NUTRITION SUPPLEMENT; Lunch, Dinner; Frozen Oral Supplement  ADULT DIET; Regular; 5 carb choices (75 gm/meal); Low Potassium (Less than 3000 mg/day)  Subjective: pt reports being confused whenever he wakes up stating \"I think I'm completely somewhere else and I'm talking to people that aren't there. I wake up a bit more and look around and recognize that I'm in the hospital and then it's better. I find myself with a bit more memory problems and distractions make it hard for me to focus--like when therapy is interrupted for someone to take my blood pressure or give meds or if it's busy in the gym. \" Pt empowered to speak up to staff if he's having issues and can be moved to a quieter area. Goals and POC: Co-treats where appropriate with PT or OT to facilitate patient goals in functional tasks.   LTG                           Short-term Goals  Timeframe for Short-term Goals: 3x/week x1 week 30 mins min  LTG: Pt will improve recall of learned safety for safe return home with support. Goal 1: Complete cognitive assessment when able to remain alert and establish goals as appropriate. Completed 4/1/22 with BCAT score 35/50  Goal 2: Pt will improve problem solving for safety for given situations with min cues and 80% acc  Pt perked up and was very interactive and engaged with this therapist.  Safety and problem solving were targeted with pt  asked to identify and correct problems demonstrated by this therapist regarding: walker safety for obtaining items, transfers (sit to stand/stand to sit), managing situations for items too high/low, tight spaces, energy conservation. Goal met this session with min cues and 80% acc. Good insight and awareness with occasional confusion that pt would redirect himself back to task. Goal 3: Pt will recall learned safety strategies within home environment with min cues with 80% acc. Meeting goal verbally this date with min cues 80% acc. Will see for cotreat with PT for carryover. Pt oriented x4. Likely many issues stem from reduced alertness and activity tolerance. When alert and very responsive pt cognition is greatly improved. The patient demonstrated a positive response to todays treatment and is making gradual progress toward established goals as evidenced by improved cognition and awareness. Ongoing deficits are observed in the areas of fx carryover and continued focus on cotreat for carryover tasks  is recommended.      Alarm placed: [x]bed []chair   []other:   [] activated        Barriers to progress:   [] Fatigue        [x] Cognitive Deficits   [] Memory Deficits   [] Reduced Attn   [] Self Limiting Behaviors    [] Reduced insight/awareness     [] Visual Deficits   [] Premorbid Conditions  [] Impulsivity     [] Other      Education/Interventions used this date: see above    []   Progress was updated and reviewed in Rehabtracker with patient and/or family this         date. [] Attending Care Conference for pt this date. See Team Patient Care Conference Note for updates.     Interventions used this date:  [] Speech/Language Treatment       [] Dysphagia Treatment     [x] Cognitive Skill Development  [] Instruction in HEP     [] Group Therapy  Other:         Assessment / Impression                                                          Treatment/Activity Tolerance:   [x] Tolerated Treatment well:     [] Patient limited by fatigue/pain:       [] Patient limited by medical complications:    [] Adverse Reaction to Tx:   [] Significant change in status:      Electronically Signed by  RUDDY Grimes, [place], [address] [place] [address]    4/4/2022  4:34 PM

## 2022-04-04 NOTE — FLOWSHEET NOTE
04/04/22 1446   Encounter Summary   Services provided to: Patient not available   Referral/Consult From: [facility] No   Continue Visiting Yes   Volunteer Visit No   Complexity of Encounter Low   Routine   Type Initial   Assessment Unable to respond  (patient was not available.  Busy with PT)   Intervention Sustaining presence/ Ministry of presence   Outcome Coping   Spiritual/Yazidi   Type Spiritual support

## 2022-04-04 NOTE — PROGRESS NOTES
Physical Therapy      [x] daily progress note       [] discharge       Patient Name:  Edgar Hope   :  1940 MRN: [id number]  Room:  [address] [place] [address] Date of Admission: 3/30/2022  Rehabilitation Diagnosis:   Metabolic encephalopathy [A57.32]  Metabolic encephalopathy [L12.61]       Date 2022       Day of ARU Week:  6   Time IN/OUT 4462-7952   Individual Tx Minutes 30   TOTAL Tx Time Mins 30   Variance Time    Variance Time []   Refusal due to:     []   Medical hold/reason:    []   Illness   []   Off Unit for test/procedure  []   Extra time needed to complete task  []   Therapeutic need  []   Other (specify):   Restrictions Restrictions/Precautions  Restrictions/Precautions: General Precautions,Fall Risk,Contact Precautions (suprapubic catheter)      Communication with other providers: [x]   OK to see per nursing:     []   Spoke with team member regarding:      Subjective observations and cognitive status: Pt asleep upon arrival, once A pt to EOB pt becoming more alert, however not oriented initially to place, month, year, or situation. Pt reports does nap at home but feels more tired than his \"normal\". Pt was able to remain alert with some prompting during session   Pain level/location: 0/10       Location:    Discharge recommendations  Anticipated discharge date:  tbd  Destination: []??home alone   []? ?home alone with assist PRN     [x]? ? home w/ family but alone at times     []? ? Continuous supervision  []? ?SNF    []? ? Assisted living     []? ? Other:  Continued therapy: [x]? ?C PT  []??OUTPATIENT PT   []? ? No Further PT  []? ?SNF PT  Caregiver training recommended: []? ?Yes  []? ? No   Equipment needs: tbd. Feel pt may need w/c but son does not think appropriate width w/c will work in the home. Will continue to assess     Bed Mobility:           []   Pt received out of bed   Rolling R/L:  Dep to L  Scooting:  CG-Min A sit scoot to EOB  Supine --> Sit:  Mod A from L SL  Sit --> Supine:   Mod A LE's into bed  Bed features used: [] Yes    [] HOB elevated      [x] Bed rail                                    [] No     Transfers:    Sit--> Stand:  CGA from EOB  Stand --> Sit:   CGA from EOB  Pt able to take side steps to L to [facility] with RW with CGA  Assistive device required for transfer:   RW      Additional Therapeutic activities/exercises completed this date:     []   Nu-step:  Time:        Level:         #Steps:       []   Rebounder:    []  Seated     []  Standing        [x]   Balance training : SBA for sitting EOB, pt initially with minimal R lateral lean, but able to correct with cueing. Pt SBA while performing seated LE therx unsupported. []   Postural training    []   Supine ther ex (reps/sets):     [x]   Seated ther ex (reps/sets):   B LEs AA-AROM x 20 for APs, x 15 for LAQ, hip flexion, hip abd, knee flexion   []   Standing ther ex (reps/sets):     []   Picked up object from floor                       []   Reacher used   []   Other:   []   Other:   []   Other:      Patient/Caregiver Education and Training:   [x]   Bed Mobility/Transfer technique/safety  []   Gait technique/sequencing  []   Proper use of assistive device  []   Advanced mobility safety and technique  []   Reinforced patient's precautions with mobility/functional tasks  []   Postural awareness  []   Family training  []   Other:    Treatment Plan for Next Session: transfers, standing tolerance/balance, gait progression, LE therx       Treatment/Activity Tolerance:   [x] Tolerated treatment with no adverse effects    [x] Patient limited by fatigue  [] Patient limited by pain   [] Patient limited by medical complications:    [] Adverse reaction to Tx:   [] Significant change in status    Safety:       [x]  bed alarm set    []  chair alarm set    []  Pt refused alarms                []  Telesitter activated      [x]  Gait belt used during tx session      []other:         Number of Minutes/Billable Intervention  Gait Training    Therapeutic Exercise 20   Neuro Re-Ed    Therapeutic Activity 10   Wheelchair Propulsion    Group    Other:    TOTAL 30         Social History  Social/Functional History  Lives With: Son  Type of Home: House  Home Layout: One level  Home Access: Ramped entrance  Bathroom Shower/Tub: Tub/Shower unit,Shower chair with back  Bathroom Toilet: Standard  Bathroom Equipment: Grab bars in shower,Shower chair  Bathroom Accessibility: Cherri Thais accessible (states once he is in bathroom he uses countertops and grab bars, but pt not able to clarify if this is because there is little room or just preference)  Home Equipment: [address] walker (pt thinks there may be a BSC from his wife in storage, but unsure)  ADL Assistance: Independent  Homemaking Assistance: Independent  Homemaking Responsibilities: Yes  Meal Prep Responsibility: Secondary  Laundry Responsibility: Primary  Cleaning Responsibility: Secondary  Bill Paying/Finance Responsibility: Primary  Shopping Responsibility: Secondary  Health Care Management: Primary (has pill box with alarms per pt. until recently [address] would set up, but once Tyrell 78 stopped he managed on own)  Ambulation Assistance: Independent (mod I with 4ww for up to 150'. pt used electronic carts at stores when able)  Transfer Assistance: Independent  Active : No  Patient's  Info: 3 sons assist in driving. Mode of Transportation: Car,SUV  Occupation: Retired  Type of occupation: Retired from [address] [place] [address] Street: TV, very little reading, no pets, doctors, son manages groceries,  Meds are managed via pill dispenser that son manages. Pt occasionally writes checks and some auto pay  Additional Comments: pt sleeps in flat bed. Pt has fallen >4 times in past year. Pt attributes many to low BS. No injury    Objective                                                                                    Goals:  (Update in navigator)   :   Long term goals  Time Frame for Long term goals : 12-14 days  Long term goal 1: Pt will perform bed mobility with mod I  Long term goal 2: Pt will perform sit to stand and pivot transfers with mod I, car transfers with CGA  Long term goal 3: Pt will ambulate 10' with 2ww with mod I, up to 150' with supervision including uneven surfaces  Long term goal 4: Pt will ascend/descend curb step with 2ww and up to 4 steps with B rails with supervision  Long term goal 5: Pt will  object from floor with 2ww and reacher with supervision:        Plan of Care                                                                              Times per week: 5 days per week for a minimum of 60 minutes/day plus group as appropriate for 60 minutes.   Treatment to include Current Treatment Recommendations: Strengthening,ROM,Balance Training,Functional Mobility Training,Transfer Training,ADL/Self-care Training,Gait Training,Patient/Caregiver Education & Training,IADL Training,Stair training,Equipment Evaluation, Education, & procurement,Neuromuscular Re-education,Pain Management,Home Exercise [employer] Training,Safety Education & Training,Cognitive/Perceptual Training,Cognitive Reorientation    Electronically signed by   Ankit Landrum, PTA #[id number]  4/4/2022, 8:57 AM

## 2022-04-04 NOTE — PROGRESS NOTES
Occupational Therapy    Physical Rehabilitation: OCCUPATIONAL THERAPY     [x] daily progress note       [] discharge       Patient Name:  Jacki Tate   :  1940 MRN: [id number]  Room:  [address] [place] [address] Date of Admission: 3/30/2022  Rehabilitation Diagnosis:   Metabolic encephalopathy [X13.35]  Metabolic encephalopathy [E20.90]       Date 2022       Day of ARU Week:  6   Time IN/OUT 3280-3421   Individual Tx Minutes 60   Group Tx Minutes    Co-Treat Minutes    Concurrent Tx Minutes    TOTAL Tx Time Mins 60   Variance Time    Variance Time []   Refusal due to:     []   Medical hold/reason:    []   Illness   []   Off Unit for test/procedure  []   Extra time needed to complete task  []   Therapeutic need  []   Other (specify):   Restrictions Restrictions/Precautions: General Precautions,Fall Risk,Contact Precautions (suprapubic catheter)         Communication with other providers: [x]   OK to see per nursing:     []   Spoke with team member regarding:      Subjective observations and cognitive status: Pt sleeping in bed on approach, able to wake and agreeable to therapy session. Pt became increasingly confused and agitated throughout session. Pain level/location:    /10       Location:    Discharge recommendations  Anticipated discharge date:  TBD  Destination: []?home alone   []?home alone w assist prn   []? home w/ family    []? Continuous supervision       []? SNF    []? Assisted living     []? Other:   Continued therapy: []?C OT  []? OUTPATIENT  OT   []? No Further OT  Equipment needs: TBD        ADLs:    Oral Hygiene: pt refused this date       UB/LB Bathing: pt refused sinkside ADL this date     UB Dressing: pt refused UB dressing         LB Dressing: Lower Body Dressing  Assistance Needed: Substantial/maximal assistance  Comment: Pt required assist to thread mccrary bag into brief and pants, becoming increasingly agitated and confused during activity.  Pt required min assist to manage over hips  CARE Score: 2  Discharge Goal: Supervision or touching assistance       Toileting: Toileting Hygiene  Assistance Needed: Substantial/maximal assistance  Comment: min A for pants management; Max A for bowel hygiene  CARE Score: 2  Discharge Goal: Supervision or touching assistance      Toilet Transfers:   CGA Toilet Transfer  Assistance Needed: Supervision or touching assistance  Comment: CGA c RW requiring cues for safety  CARE Score: 4  Discharge Goal: Supervision or touching assistance  Device Used:    [x]   Standard Toilet         [x]   Grab Bars           []  Bedside Commode       []   Elevated Toilet          []   Other:        Bed Mobility:           []   Pt received out of bed   Scooting: Max A x 2 to HOB   Supine --> Sit:  SBA   Sit --> Supine:  Max A     Transfers:    Sit--> Stand:  Ranging from CGA-mod A d/t fatigue   Stand --> Sit:   Min A for controlled descent   Stand-Pivot:   CGA c cues during 1st trial; max A during 2nd trial; pt turned and sat down on bed before he was safely there. Educated pt on safety during transfers, however, pt not receptive to education stating, \"I made it. \"   Other:    Assistive device required for transfer:   RW       Functional Mobility:  From bed to bathroom   Assistance:  CGA   Device:   [x]   Rolling Walker     []   Standard [employer] []   Wheelchair        []   [address] [facility]       []   Elvie Lefort         []   [employer] [employer]       []   Other:          Additional Therapeutic activities/exercises completed this date:     [x]   ADL Training   [x]   Balance/Postural training     [x]   Bed/Transfer Training   []   Endurance Training   []   Neuromuscular Re-ed   []   Nu-step:  Time:        Level:         #Steps:       []   Rebounder:    []  Seated     []  Standing        []   Supine Ther Ex (reps/sets):     []   Seated Ther Ex (reps/sets):     []   Standing Ther Ex (reps/sets):     []   Other:      Comments:      Patient/Caregiver Education and Training:   []   [employer] [place] Use  []   Bed Mobility/Transfer Technique/Safety  []   Energy Conservation Tips  []   Family training  []   Postural Awareness  []   Safety During Functional Activities  []   Reinforced Patient's Precautions   []   Progress was updated and reviewed in Rehabtracker with patient and/or family this         date. Treatment Plan for Next Session: Continue OT POC      Assessment: This pt demonstrated a negative response to today's treatment as evidenced by increased confusion and agitated. The patient is making slow progress toward established goals as evidenced by QI scores. Ongoing deficits are observed in the areas of functional transfers, UB strength, endurance, balance and continued focus on this is recommended.        Treatment/Activity Tolerance:   [x] Tolerated treatment with no adverse effects    [] Patient limited by fatigue  [] Patient limited by pain   [] Patient limited by medical complications:    [] Adverse reaction to Tx:   [] Significant change in status    Safety:       []  bed alarm set    []  chair alarm set    []  Pt refused alarms                []  Telesitter activated      [x]  Gait belt used during tx session      []other:       Number of Minutes/Billable Intervention  Therapeutic Exercise    ADL Self-care 60   Neuro Re-Ed    Therapeutic Activity    Group    Other:    TOTAL 60       Social History  Social/Functional History  Lives With: Son  Type of Home: House  Home Layout: One level  Home Access: Ramped entrance  Bathroom Shower/Tub: Tub/Shower unit,Shower chair with back  Bathroom Toilet: Standard  Bathroom Equipment: Grab bars in shower,Shower chair  Bathroom Accessibility: Jair Webster accessible (states once he is in bathroom he uses countertops and grab bars, but pt not able to clarify if this is because there is little room or just preference)  Home Equipment: Cane,4 wheeled walker,Rolling walker (pt thinks there may be a BSC from his wife in storage, but unsure)  ADL Assistance: Independent  Homemaking Assistance: Independent  Homemaking Responsibilities: Yes  Meal Prep Responsibility: Secondary  Laundry Responsibility: Primary  Cleaning Responsibility: Secondary  Bill Paying/Finance Responsibility: Primary  Shopping Responsibility: Secondary  Health Care Management: Primary (has pill box with alarms per pt. until recently [facility]rell [facility] nursing would set up, but once Tyrell Hodge stopped he managed on own)  Ambulation Assistance: Independent (mod I with 4ww for up to 150'. pt used electronic carts at stores when able)  Transfer Assistance: Independent  Active : No  Patient's  Info: 3 sons assist in driving. 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 Pt occasionally writes checks and some auto pay  Additional Comments: pt sleeps in flat bed. Pt has fallen >4 times in past year. Pt attributes many to low BS.  No injury    Objective                                                                                    Goals:  (Update in navigator)  Short term goals  Time Frame for Short term goals: STGs=LTGs:  Long term goals  Time Frame for Long term goals : 10-14 days or until d/c  Long term goal 1: Pt will complete grooming tasks Ind seated  Long term goal 2: Pt will complete total body bathing c S  Long term goal 3: Pt will complete UB dressing c setup  Long term goal 4: Pt will complete LB dressing c supervision using AE PRN  Long term goal 5: Pt will doff/don footwear c supervision using AE PRN  Long term goals 6: Pt will complete toileting c S  Long term goal 7: Pt will complete functional transfers (bed, chair, toilet, shower) c DME PRN and S  Long term goal 8: Pt will perform therex/therax to facilitate increased strength/endurance/ax tolerance (c emphasis on dynamic standing balance >8 mins, BUE endurance, cognitive retraining) c SBA:        Plan of Care                                                                              Times per week: 5 days per week for a minimum of 60 minutes/day plus group as appropriate for 60 minutes.   Treatment to include Plan  Times per day: Daily  Current Treatment Recommendations: Strengthening,ROM,Balance Training,Functional Mobility Training,Endurance [employer] Management,Safety Education & Training,Patient/Caregiver Education & Training,Equipment Evaluation, Education, & procurement,Self-Care / ADL,Home Management Training,Cognitive/Perceptual Training    Electronically signed by   KHARI Wood,  4/4/2022, 12:42 PM

## 2022-04-05 LAB
GLUCOSE BLD-MCNC: 132 MG/DL (ref 70–99)
GLUCOSE BLD-MCNC: 149 MG/DL (ref 70–99)
GLUCOSE BLD-MCNC: 203 MG/DL (ref 70–99)
GLUCOSE BLD-MCNC: 232 MG/DL (ref 70–99)
GLUCOSE BLD-MCNC: 238 MG/DL (ref 70–99)

## 2022-04-05 PROCEDURE — 6370000000 HC RX 637 (ALT 250 FOR IP): Performed by: INTERNAL MEDICINE

## 2022-04-05 PROCEDURE — 97530 THERAPEUTIC ACTIVITIES: CPT

## 2022-04-05 PROCEDURE — 2500000003 HC RX 250 WO HCPCS: Performed by: PHYSICAL MEDICINE & REHABILITATION

## 2022-04-05 PROCEDURE — 2580000003 HC RX 258: Performed by: INTERNAL MEDICINE

## 2022-04-05 PROCEDURE — 94761 N-INVAS EAR/PLS OXIMETRY MLT: CPT

## 2022-04-05 PROCEDURE — 6370000000 HC RX 637 (ALT 250 FOR IP): Performed by: PHYSICAL MEDICINE & REHABILITATION

## 2022-04-05 PROCEDURE — 97130 THER IVNTJ EA ADDL 15 MIN: CPT

## 2022-04-05 PROCEDURE — 97110 THERAPEUTIC EXERCISES: CPT

## 2022-04-05 PROCEDURE — 94150 VITAL CAPACITY TEST: CPT

## 2022-04-05 PROCEDURE — 6360000002 HC RX W HCPCS: Performed by: INTERNAL MEDICINE

## 2022-04-05 PROCEDURE — 82962 GLUCOSE BLOOD TEST: CPT

## 2022-04-05 PROCEDURE — 97116 GAIT TRAINING THERAPY: CPT

## 2022-04-05 PROCEDURE — 97129 THER IVNTJ 1ST 15 MIN: CPT

## 2022-04-05 PROCEDURE — 1280000000 HC REHAB R&B

## 2022-04-05 RX ORDER — GLIPIZIDE 5 MG/1
5 TABLET ORAL
Status: DISCONTINUED | OUTPATIENT
Start: 2022-04-05 | End: 2022-04-07

## 2022-04-05 RX ORDER — TRAMADOL HYDROCHLORIDE 50 MG/1
25 TABLET ORAL EVERY 6 HOURS PRN
Status: DISCONTINUED | OUTPATIENT
Start: 2022-04-05 | End: 2022-04-14 | Stop reason: HOSPADM

## 2022-04-05 RX ADMIN — ATORVASTATIN CALCIUM 40 MG: 40 TABLET, FILM COATED ORAL at 22:14

## 2022-04-05 RX ADMIN — SODIUM CHLORIDE, PRESERVATIVE FREE 10 ML: 5 INJECTION INTRAVENOUS at 09:03

## 2022-04-05 RX ADMIN — ASPIRIN 81 MG 81 MG: 81 TABLET ORAL at 09:04

## 2022-04-05 RX ADMIN — DOXYCYCLINE HYCLATE 100 MG: 100 TABLET, FILM COATED ORAL at 09:04

## 2022-04-05 RX ADMIN — GLIPIZIDE 5 MG: 5 TABLET ORAL at 16:53

## 2022-04-05 RX ADMIN — ALOGLIPTIN 6.25 MG: 6.25 TABLET, FILM COATED ORAL at 09:04

## 2022-04-05 RX ADMIN — PREGABALIN 50 MG: 50 CAPSULE ORAL at 09:04

## 2022-04-05 RX ADMIN — CARVEDILOL 3.12 MG: 6.25 TABLET, FILM COATED ORAL at 09:04

## 2022-04-05 RX ADMIN — HEPARIN SODIUM 5000 UNITS: 5000 INJECTION INTRAVENOUS; SUBCUTANEOUS at 05:13

## 2022-04-05 RX ADMIN — TORSEMIDE 20 MG: 20 TABLET ORAL at 09:04

## 2022-04-05 RX ADMIN — HEPARIN SODIUM 5000 UNITS: 5000 INJECTION INTRAVENOUS; SUBCUTANEOUS at 22:14

## 2022-04-05 RX ADMIN — MIRTAZAPINE 15 MG: 15 TABLET, ORALLY DISINTEGRATING ORAL at 22:14

## 2022-04-05 RX ADMIN — GLIPIZIDE 5 MG: 5 TABLET ORAL at 09:04

## 2022-04-05 RX ADMIN — PREGABALIN 50 MG: 50 CAPSULE ORAL at 22:14

## 2022-04-05 RX ADMIN — DOXYCYCLINE HYCLATE 100 MG: 100 TABLET, FILM COATED ORAL at 22:14

## 2022-04-05 RX ADMIN — ALLOPURINOL 100 MG: 100 TABLET ORAL at 09:04

## 2022-04-05 RX ADMIN — LEVOTHYROXINE SODIUM 50 MCG: 0.05 TABLET ORAL at 05:13

## 2022-04-05 RX ADMIN — SODIUM CHLORIDE, PRESERVATIVE FREE 10 ML: 5 INJECTION INTRAVENOUS at 22:14

## 2022-04-05 RX ADMIN — CARVEDILOL 3.12 MG: 6.25 TABLET, FILM COATED ORAL at 16:53

## 2022-04-05 RX ADMIN — HEPARIN SODIUM 5000 UNITS: 5000 INJECTION INTRAVENOUS; SUBCUTANEOUS at 15:04

## 2022-04-05 RX ADMIN — ACETAMINOPHEN 650 MG: 325 TABLET ORAL at 05:13

## 2022-04-05 RX ADMIN — MICONAZOLE NITRATE: 2 POWDER TOPICAL at 22:13

## 2022-04-05 ASSESSMENT — PAIN SCALES - GENERAL
PAINLEVEL_OUTOF10: 0
PAINLEVEL_OUTOF10: 0
PAINLEVEL_OUTOF10: 8
PAINLEVEL_OUTOF10: 8

## 2022-04-05 ASSESSMENT — PAIN DESCRIPTION - LOCATION: LOCATION: BUTTOCKS;COCCYX

## 2022-04-05 ASSESSMENT — PAIN DESCRIPTION - PAIN TYPE: TYPE: ACUTE PAIN

## 2022-04-05 ASSESSMENT — PAIN DESCRIPTION - DIRECTION: RADIATING_TOWARDS: UP BACK

## 2022-04-05 ASSESSMENT — PAIN DESCRIPTION - ORIENTATION: ORIENTATION: PROXIMAL

## 2022-04-05 NOTE — PROGRESS NOTES
Kade Myers    : 1940  Acct #: [de-identified]  MRN: [id number]              PM&R Progress Note      Admitting diagnosis: ***    Comorbid diagnoses impacting rehabilitation: ***    Chief complaint: ***    Prior (baseline) level of function: Independent.     Current level of function:         Current  IRF-LESTER and Goals:   Occupational Therapy:    Short term goals  Time Frame for Short term goals: STGs=LTGs :   Long term goals  Time Frame for Long term goals : 10-14 days or until d/c  Long term goal 1: Pt will complete grooming tasks Ind seated  Long term goal 2: Pt will complete total body bathing c S  Long term goal 3: Pt will complete UB dressing c setup  Long term goal 4: Pt will complete LB dressing c supervision using AE PRN  Long term goal 5: Pt will doff/don footwear c supervision using AE PRN  Long term goals 6: Pt will complete toileting c S  Long term goal 7: Pt will complete functional transfers (bed, chair, toilet, shower) c DME PRN and S  Long term goal 8: Pt will perform therex/therax to facilitate increased strength/endurance/ax tolerance (c emphasis on dynamic standing balance >8 mins, BUE endurance, cognitive retraining) c SBA :                                       Eating: Eating  Assistance Needed: Setup or clean-up assistance  Comment: assist to open packages/containers  CARE Score: 5  Discharge Goal: Independent       Oral Hygiene: Oral Hygiene  Assistance Needed: Supervision or touching assistance  Comment: seated, increased time for object manipulation  CARE Score: 4  Discharge Goal: Independent    UB/LB Bathing: Shower/Bathe Self  Assistance Needed: Partial/moderate assistance  Comment: required assist to bathe bottom; educated pt on use of LHS; pt able to bathe distal BLEs, required assist to dry feet  CARE Score: 3  Discharge Goal: Supervision or touching assistance    UB Dressing: Upper Body Dressing  Assistance Needed: Partial/moderate assistance  Comment: able to thread BUEs into shirt, required assist to pull overhead, cues to pull down in front and back  CARE Score: 3  Discharge Goal: Set-up or clean-up assistance         LB Dressing: Lower Body Dressing  Assistance Needed: Substantial/maximal assistance  Comment: Pt required assist to thread mccrary bag into brief and pants, becoming increasingly agitated and confused during activity. Pt required min assist to manage over hips  CARE Score: 2  Discharge Goal: Supervision or touching assistance    Donning and [place] Footwear: Putting On/Taking Off Footwear  Assistance Needed: Substantial/maximal assistance  Comment: educated pt on use of sock aid; required max A to doff/don socks  CARE Score: 2  Discharge Goal: Supervision or touching assistance      Toileting: Toileting Hygiene  Assistance Needed: Substantial/maximal assistance  Comment: min A for pants management; Max A for bowel hygiene  CARE Score: 2  Discharge Goal: Supervision or touching assistance      Toilet Transfers:   Toilet Transfer  Assistance Needed: Supervision or touching assistance  Comment: CGA c RW requiring cues for safety  CARE Score: 4  Discharge Goal: Supervision or touching assistance    Physical Therapy:         Long term goals  Time Frame for Long term goals : 12-14 days  Long term goal 1: Pt will perform bed mobility with mod I  Long term goal 2: Pt will perform sit to stand and pivot transfers with mod I, car transfers with CGA  Long term goal 3: Pt will ambulate 10' with 2ww with mod I, up to 150' with supervision including uneven surfaces  Long term goal 4: Pt will ascend/descend curb step with 2ww and up to 4 steps with B rails with supervision  Long term goal 5: Pt will  object from floor with 2ww and reacher with supervision      Bed Mobility:   Sit to Lying  Assistance Needed: Substantial/maximal assistance  Comment: max assist for B LE, improved control of trunk  CARE Score: 2  Discharge Goal: Independent  Roll Left and Right  Assistance Needed: Substantial/maximal assistance  Comment: max assist with rails  CARE Score: 2  Discharge Goal: Independent  Lying to Sitting on Side of Bed  Assistance Needed: Partial/moderate assistance  Comment: mod assist for trunk  CARE Score: 3  Discharge Goal: Independent    Transfers:    Sit to Stand  Assistance Needed: Partial/moderate assistance  Comment: varies min to mod assist dependent on height of surface  Reason if not Attempted: Not attempted due to medical condition or safety concerns  CARE Score: 3  Discharge Goal: Independent  Chair/Bed-to-Chair Transfer  Assistance Needed: Partial/moderate assistance  Comment: mod assist with 2ww and max time allowed  Reason if not Attempted: Not attempted due to medical condition or safety concerns  CARE Score: 3  Discharge Goal: Independent  Toilet Transfer  Assistance Needed: Substantial/maximal assistance  Comment: max assist and max verbal cues for techique, posture, safety  CARE Score: 2  Car Transfer  Comment: unable to attempt due to time contraint as pt felt he needed to use toilet  Reason if not Attempted: Not attempted due to medical condition or safety concerns  CARE Score: 88  Discharge Goal: Supervision or touching assistance    Ambulation:    Walking Ability  Does the Patient Walk?: Yes     Walk 10 Feet  Assistance Needed: Partial/moderate assistance  Comment: min assist with 2ww and max time allowed, leaning to R with cues for L weightshift and R step length  Reason if not Attempted: Not attempted due to medical condition or safety concerns  CARE Score: 3  Discharge Goal: Independent     Walk 50 Feet with Two Turns  Reason if not Attempted: Not attempted due to medical condition or safety concerns  CARE Score: 88  Discharge Goal: Supervision or touching assistance     Walk 150 Feet  Reason if not Attempted: Not attempted due to medical condition or safety concerns  CARE Score: 88  Discharge Goal: Supervision or touching assistance     Walking 10 Feet on Uneven Surfaces  Reason if not Attempted: Not attempted due to medical condition or safety concerns  CARE Score: 88  Discharge Goal: Supervision or touching assistance     1 Step (Curb)  Reason if not Attempted: Not attempted due to medical condition or safety concerns  CARE Score: 88  Discharge Goal: Supervision or touching assistance     4 Steps  Reason if not Attempted: Not attempted due to medical condition or safety concerns  CARE Score: 88  Discharge Goal: Supervision or touching assistance     12 Steps  Reason if not Attempted: Not applicable  CARE Score: 9  Discharge Goal: Not Applicable       Wheelchair:  w/c Ability: Wheelchair Ability  Uses a Wheelchair and/or Scooter?: No  Wheel 50 Feet with Two Turns  Assistance Needed: Partial/moderate assistance  Comment: 30' max distance  CARE Score: 3             Balance:        Object: Picking Up Object  Assistance Needed: Substantial/maximal assistance  Comment: started with mod assist as he took off his L hand(due to leaning to R), and able to grasp object, then as pt bringing it up, lost balance posterior and needed max assist to recover  Reason if not Attempted: Not attempted due to medical condition or safety concerns  CARE Score: 2  Discharge Goal: Supervision or touching assistance    I      Exam:    Blood pressure 138/69, pulse 73, temperature 97.5 °F (36.4 °C), temperature source Oral, resp. rate 16, height 5' 8\" (1.727 m), weight 200 lb 13.4 oz (91.1 kg), SpO2 100 %. General: ***    HEENT: ***    Pulmonary: ***    Cardiac: ***    Abdomen: Patient's abdomen is soft and nondistended. Bowel sounds were present throughout. There was no rebound, guarding or masses noted. Upper extremities: ***    Lower extremities: ***    Sitting balance was ***. Standing balance was ***.     Lab Results   Component Value Date    WBC 7.8 04/04/2022    HGB 8.7 (L) 04/04/2022    HCT 30.8 (L) 04/04/2022    .5 (H) 04/04/2022     04/04/2022     Lab Results Component Value Date    INR 1.20 03/17/2022    INR 0.99 05/19/2015    INR 1.31 03/23/2013    PROTIME 15.5 (H) 03/17/2022    PROTIME 11.3 05/19/2015    PROTIME 14.2 03/23/2013     Lab Results   Component Value Date    CREATININE 2.2 (H) 04/04/2022    BUN 54 (H) 04/04/2022     04/04/2022    K 5.0 04/04/2022     04/04/2022    CO2 19 (L) 04/04/2022     Lab Results   Component Value Date    ALT 12 04/04/2022    AST 14 (L) 04/04/2022    ALKPHOS 108 04/04/2022    BILITOT 0.2 04/04/2022       Expected length of stay  prior to a supervised level of function for discharge home with a walker and HHC OT/PT is ***    Recommendations:    ***

## 2022-04-05 NOTE — PROGRESS NOTES
[place]  ACUTE REHAB UNIT  SPEECH/LANGUAGE PATHOLOGY      [x] Daily           [x] Discharge    Patient:Greg Herrera      :1940  UFS:[id number]  Rehab Dx/Hx: Metabolic encephalopathy [U70.55]  Metabolic encephalopathy [B23.31]   No Known Allergies  Precautions:Restrictions/Precautions: General Precautions,Fall Risk,Contact Precautions (suprapubic catheter)          Home Situation/IADL:   Social/Functional History  Lives With: Son  Type of Home: House  Home Layout: One level  Home Access: Ramped entrance  Bathroom Shower/Tub: Tub/Shower unit,Shower chair with back  Bathroom Toilet: Standard  Bathroom Equipment: Grab bars in shower,Shower chair  Bathroom Accessibility: [place] accessible (states once he is in bathroom he uses countertops and grab bars, but pt not able to clarify if this is because there is little room or just preference)  Home Equipment: Cane,4 wheeled walker,Rolling walker (pt thinks there may be a BSC from his wife in storage, but unsure)  ADL Assistance: Independent  Homemaking Assistance: Independent  Homemaking Responsibilities: Yes  Meal Prep Responsibility: Secondary  Laundry Responsibility: Primary  Cleaning Responsibility: Secondary  Bill Paying/Finance Responsibility: Primary  Shopping Responsibility: Secondary  Health Care Management: Primary (has pill box with alarms per pt. until recently [address] would set up, but once [facility] stopped he managed on own)  Ambulation Assistance: Independent (mod I with 4ww for up to 150'. pt used electronic carts at stores when able)  Transfer Assistance: Independent  Active : No  Patient's  Info: 3 sons assist in driving. 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   Pt occasionally writes checks and some auto pay  Additional Comments: pt sleeps in flat bed. Pt has fallen >4 times in past year. Pt attributes many to low BS. No injury      Date of Admit: 3/30/2022  Room #: 1025/1025-A     ST Number of Minutes/Billable Intervention  Cog/Memory Deficits 30    Aphasia/Language     Dysarthria/Speech     Apraxia/Speech     Dysphagia/Swallowing     Group     Other    TOTAL Minutes Billed  30    Variance          Date: 4/5/2022  Day of ARU Week:  7       Cotreat in: Time In: 0935  Cotreat out-Time Out: 1005  Cotreat total-Minutes: 30     Variance/Reason:  [] Refusal due to   [] Medical hold/reason  [] Illness   [] Off Unit for test/procedure  [] Extra time needed to complete task  [] Other (specify)    Activity completed: Pt seen for cotreat with PT for carryover of learned tasks based on discussion and verbal problem solving yesterday    Pain: denies  Current Diet: ADULT ORAL NUTRITION SUPPLEMENT; Breakfast; Fortified Pudding Oral Supplement  ADULT ORAL NUTRITION SUPPLEMENT; Lunch, Dinner; Frozen Oral Supplement  ADULT DIET; Regular; 5 carb choices (75 gm/meal); Low Potassium (Less than 3000 mg/day)  Subjective: alert and motivate; pt is a very private person and likes cath bag hidden and quiet environment to focus/concentrate      Goals and POC: Co-treats where appropriate with PT or OT to facilitate patient goals in functional tasks. LTG                           Short-term Goals  Timeframe for Short-term Goals: 3x/week x1 week 30 mins min  LTG: Pt will improve recall of learned safety for safe return home with support. Meeting goals  Goal 1: Complete cognitive assessment when able to remain alert and establish goals as appropriate.  Completed 4/1/22 with BCAT score 35/50  Goal 2: Pt will improve problem solving for safety for given situations with min cues and 80% acc A cotreat was completed this date with  [x] PT    [] OT   [] ST      This pt requires simultaneous intervention of 2 skilled therapists to safely complete tasks to address complexity of deficits defined in the goals including:  []Attention   [x] Safety    [x]Problem Solving    []Sequencing     []Initiation    []Processing      []Recall of learned tasks  [] Communication  [] Self Feeding   []ADL's    []UE Function    []Motor planning   []Balance  []Energy Conservation   []Verbal cues   [] Tactile Cues  []Barriers     [x]Transfers   [x]Device Use  Pt's response to cotreat: Pt meeting goals--min cues for safety for body position within walker and posture, reaching back to sit. Progress toward goals: meeting goals with min cues. Goal 3: Pt will recall learned safety strategies within home environment with min cues with 80% acc. Min cues for walker safety and positioning. Meeting goal.    Discharge from [address] [place] [address] with family to provide cues as needed. Pt expected to improve with ongoing fx repetition of tasks and activity tolerance. Pt ambulated 36' with RW, setting goal for himself, and working to achieve. See PT notes for details. No further ST recommended. Min cog deficits continue to show improvement. The patient demonstrated a positive response to todays treatment and is making ongoing progress toward established goals as evidenced by improved safety. Ongoing deficits are observed in the areas of cognition and continued focus on practical application and repetition for safety is recommended with PT/OT    Alarm placed: []bed [x]chair   []other:   [] activated        Barriers to progress:   [x] Fatigue        [] Cognitive Deficits   [] Memory Deficits   [] Reduced Attn   [] Self Limiting Behaviors    [] Reduced insight/awareness     [] Visual Deficits   [] Premorbid Conditions  [] Impulsivity     [] Other      Education/Interventions used this date: fx safety    []   Progress was updated and reviewed in Rehabtracker with patient and/or family this         date. [x] Attending Care Conference for pt this date.   See Team Patient Care Conference Note for updates.     Interventions used this date:  [] Speech/Language Treatment       [] Dysphagia Treatment     [x] Cognitive Skill Development  [] Instruction in HEP     [] Group Therapy  Other:         Assessment / Impression                                                          Treatment/Activity Tolerance:   [x] Tolerated Treatment well:     [] Patient limited by fatigue/pain:       [] Patient limited by medical complications:    [] Adverse Reaction to Tx:   [] Significant change in status:      Electronically Signed by  RUDDY Sheldon, MA, CCC-SLP    4/5/2022  7:17 PM

## 2022-04-05 NOTE — PROGRESS NOTES
Occupational Therapy  Physical Rehabilitation: OCCUPATIONAL THERAPY     [x] daily progress note       [] discharge       Patient Name:  Davey Ceron   :  1940 MRN: [id number]  Room:  [address] [place] [address] Date of Admission: 3/30/2022  Rehabilitation Diagnosis:   Metabolic encephalopathy [Q83.33]  Metabolic encephalopathy [K66.33]       Date 2022       Day of ARU Week:  7   Time IN/OUT 1500/1600   Individual Tx Minutes 60   Group Tx Minutes    Co-Treat Minutes    Concurrent Tx Minutes    TOTAL Tx Time Mins 60   Variance Time    Variance Time []   Refusal due to:     []   Medical hold/reason:    []   Illness   []   Off Unit for test/procedure  []   Extra time needed to complete task  []   Therapeutic need  []   Other (specify):   Restrictions Restrictions/Precautions: General Precautions,Fall Risk,Contact Precautions (suprapubic catheter)         Communication with other providers: [x]   OK to see per nursing:     []   Spoke with team member regarding:      Subjective observations and cognitive status: Pt in bed on approach, fatigued but agreeable to participate     Pain level/location:    3/10       Location: Back   Discharge recommendations  Anticipated discharge date:   Destination: []home alone   []home alone w assist prn   [] home w/ family    [x] Continuous supervision       []SNF    [] Assisted living     [] Other:   Continued therapy: [x]HHC OT  []OUTPATIENT  OT   [] No Further OT  Equipment needs: None     Toileting:   Reported need for BM at very end of session so not fully completed during OT encounter; RN present to assist       Toilet Transfers:   CGA from W/C using grab bar  Device Used:    []   Standard Toilet         [x]   Grab Bars           []  Bedside Commode       []   Elevated Toilet          []   Other:        Bed Mobility:           []   Pt received out of bed   Supine --> Sit:  Max A, pt with poor body mechanics        Transfers:    Sit--> Stand:  CGA-min A  Stand --> Sit:   Min A  Stand-Pivot:   Min A  Other:    Assistive device required for transfer:   W/C      Additional Therapeutic activities/exercises completed this date:     []   ADL Training   [x]   Balance/Postural training: Pt stood x  2 min at counter with CGA while completing dynamic stand task while reaching outside base of support to facilitate increased balance for ADL tasks and transfers. Pt's BLE visibly flexed with increasing fatigue however pt did demo self awareness to correct. Pt also stood x2 mins in the room to  for RN to change sacral border dressing      [x]   Bed/Transfer Training   [x]   Endurance Training   []   Neuromuscular Re-ed   []   Nu-step:  Time:        Level:         #Steps:       []   Rebounder:    []  Seated     []  Standing        []   Supine Ther Ex (reps/sets):     [x]   Seated Ther Ex (reps/sets): To increase BUE endurance for ADLs and transfers pt completed arm ergometer on min resistance x8 mins, 0 rest breaks but average only 18 RPM 2* fatigue      To increase BUE endurance for ADLs and transfers pt completed [facility] reps total [facility]#; quality diminishing with fatigue            Comments: All intervention performed to increase pt's strength, endurance, ax tolerance, and  balance in prep for increased I c ADL/IADLs and functional transfers/mobility. Patient/Caregiver Education and Training:   []   [employer] Equipment Use  [x]   Bed Mobility/Transfer Technique/Safety  []   Energy Conservation Tips  []   Family training  [x]   Postural Awareness  [x]   Safety During Functional Activities  []   Reinforced Patient's Precautions   []   Progress was updated and reviewed in Rehabtracker with patient and/or family this         date.     Treatment Plan for Next Session: Continue OT POC, ADL retraining including leg bag (MD approved using this when discussed in care conference)    Assessment:  Pt is motivated to participate however became progressively limited by fatigue      Treatment/Activity Tolerance:   [] Tolerated treatment with no adverse effects    [x] Patient limited by fatigue  [] Patient limited by pain   [] Patient limited by medical complications:    [] Adverse reaction to Tx:   [] Significant change in status    Safety:       []  bed alarm set    []  chair alarm set    []  Pt refused alarms                []  Telesitter activated      [x]  Gait belt used during tx session      [x]other: RN present to provide assist with toileting       Number of Minutes/Billable Intervention  Therapeutic Exercise 20   ADL Self-care    Neuro Re-Ed    Therapeutic Activity 40   Group    Other:    TOTAL 60       Social History  Social/Functional History  Lives With: Son  Type of Home: House  Home Layout: One level  Home Access: Ramped entrance  Bathroom Shower/Tub: Tub/Shower unit,Shower chair with back  Bathroom Toilet: Standard  Bathroom Equipment: Grab bars in shower,Shower chair  Bathroom Accessibility: [place] accessible (states once he is in bathroom he uses countertops and grab bars, but pt not able to clarify if this is because there is little room or just preference)  Home Equipment: Cane,4 wheeled walker,Rolling walker (pt thinks there may be a BSC from his wife in storage, but unsure)  ADL Assistance: Independent  Homemaking Assistance: Independent  Homemaking Responsibilities: Yes  Meal Prep Responsibility: Secondary  Laundry Responsibility: Primary  Cleaning Responsibility: Secondary  Bill Paying/Finance Responsibility: Primary  Shopping Responsibility: Secondary  Health Care Management: Primary (has pill box with alarms per pt. until recently [address] would set up, but once [address] stopped he managed on own)  Ambulation Assistance: Independent (mod I with 4ww for up to 150'. pt used electronic carts at stores when able)  Transfer Assistance: Independent  Active : No  Patient's  Info: 3 sons assist in driving.   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 Pt occasionally writes checks and some auto pay  Additional Comments: pt sleeps in flat bed. Pt has fallen >4 times in past year. Pt attributes many to low BS. No injury    Objective                                                                                    Goals:  (Update in navigator)  Short term goals  Time Frame for Short term goals: STGs=LTGs:  Long term goals  Time Frame for Long term goals : 10-14 days or until d/c  Long term goal 1: Pt will complete grooming tasks Ind seated  Long term goal 2: Pt will complete total body bathing c S  Long term goal 3: Pt will complete UB dressing c setup  Long term goal 4: Pt will complete LB dressing c supervision using AE PRN  Long term goal 5: Pt will doff/don footwear c supervision using AE PRN  Long term goals 6: Pt will complete toileting c S  Long term goal 7: Pt will complete functional transfers (bed, chair, toilet, shower) c DME PRN and S  Long term goal 8: Pt will perform therex/therax to facilitate increased strength/endurance/ax tolerance (c emphasis on dynamic standing balance >8 mins, BUE endurance, cognitive retraining) c SBA:        Plan of Care                                                                              Times per week: 5 days per week for a minimum of 60 minutes/day plus group as appropriate for 60 minutes.   Treatment to include Plan  Times per day: Daily  Current Treatment Recommendations: Strengthening,ROM,Balance Training,Functional Mobility Training,Endurance Training,Pain Management,Safety Education & Training,Patient/Caregiver Education & Training,Equipment Evaluation, Education, & procurement,Self-Care / ADL,Home Management Training,Cognitive/Perceptual Training    Electronically signed by   Rudi Sinclair MS, OTR/L  License #[id number]  4/5/2022, 3:39 PM

## 2022-04-05 NOTE — PROGRESS NOTES
Nephrology Progress Note  4/5/2022 10:14 AM  Subjective: Interval History: Varinder Zayas is a 78 y.o. male appears doing okay walking in the hallway today      Data:   Scheduled Meds:   glipiZIDE  5 mg Oral BID AC    insulin lispro  0-18 Units SubCUTAneous TID WC    insulin lispro  0-9 Units SubCUTAneous Nightly    torsemide  20 mg Oral Daily    [Held by provider] insulin glargine  10 Units SubCUTAneous Nightly    heparin (porcine)  5,000 Units SubCUTAneous 3 times per day    sodium chloride flush  5-40 mL IntraVENous 2 times per day    allopurinol  100 mg Oral Daily    alogliptin  6.25 mg Oral Daily    aspirin  81 mg Oral Daily    atorvastatin  40 mg Oral Nightly    carvedilol  3.125 mg Oral BID WC    levothyroxine  50 mcg Oral Daily    mirtazapine  15 mg Oral Nightly    pregabalin  50 mg Oral BID    doxycycline hyclate  100 mg Oral 2 times per day     Continuous Infusions:   dextrose           CBC   Recent Labs     04/04/22  1300   WBC 7.8   HGB 8.7*   HCT 30.8*         BMP   Recent Labs     04/04/22  1300      K 5.0      CO2 19*   PHOS 5.2*   BUN 54*   CREATININE 2.2*     Hepatic:   Recent Labs     04/04/22  1300   AST 14*   ALT 12   BILITOT 0.2   ALKPHOS 108     Troponin: No results for input(s): TROPONINI in the last 72 hours. BNP: No results for input(s): BNP in the last 72 hours. Lipids: No results for input(s): CHOL, HDL in the last 72 hours. Invalid input(s): LDLCALCU  ABGs: No results found for: PHART, PO2ART, AVP0ISE  INR: No results for input(s): INR in the last 72 hours.   Renal Labs  Albumin:    Lab Results   Component Value Date    LABALBU 3.5 04/04/2022     Calcium:    Lab Results   Component Value Date    CALCIUM 9.6 04/04/2022     Phosphorus:    Lab Results   Component Value Date    PHOS 5.2 04/04/2022     U/A:    Lab Results   Component Value Date    NITRU NEGATIVE 03/29/2022    NITRU NEGATIVE 03/23/2013    COLORU YELLOW 03/29/2022    WBCUA NONE SEEN 03/29/2022    RBCUA 56 03/29/2022    MUCUS RARE 03/29/2022    TRICHOMONAS NONE SEEN 03/29/2022    YEAST MANY 03/29/2022    BACTERIA NEGATIVE 03/29/2022    CLARITYU CLOUDY 03/29/2022    SPECGRAV 1.015 03/29/2022    UROBILINOGEN 0.2 03/29/2022    BILIRUBINUR NEGATIVE 03/29/2022    BLOODU LARGE 03/29/2022    GLUCOSEU 1,000 03/23/2013    KETUA NEGATIVE 03/29/2022    AMORPHOUS RARE 11/16/2021     ABG:  No results found for: PHART, YMU2RSD, PO2ART, KAY4YSP, BEART, THGBART, MRT2DBT, H1NEGKMI  HgBA1c:    Lab Results   Component Value Date    LABA1C 9.2 03/22/2022     Microalbumen/Creatinine ratio:  No components found for: RUCREAT  TSH:  No results found for: TSH  IRON:    Lab Results   Component Value Date    IRON 15 03/22/2022     Iron Saturation:  No components found for: PERCENTFE  TIBC:    Lab Results   Component Value Date    TIBC 214 03/22/2022     FERRITIN:    Lab Results   Component Value Date    FERRITIN 352 03/22/2022     RPR:  No results found for: RPR  MITALI:  No results found for: ANATITER, MITALI  24 Hour Urine for Creatinine Clearance:  No components found for: CREAT4, UHRS10, UTV10      Objective:   I/O: 04/04 0701 - 04/05 0700  In: 760 [P.O.:760]  Out: 2190 [facility] [TUNWD:1748]  I/O last 3 completed shifts: In: 1120 [P.O.:1120]  Out: 2925 [Urine:2925]  I/O this shift:  In: -   Out: 175 [Urine:175]  Vitals: BP (!) 144/57 Comment: rechekced with monitor; 142/57  Pulse 80   Temp 98.7 °F (37.1 °C) (Axillary)   Resp 14   Ht 5' 8\" (1.727 m)   Wt 200 lb 13.4 oz (91.1 kg)   SpO2 98%   BMI 30.54 kg/m²  {  General appearance: awake weak  HEENT: Head: Normal, normocephalic, atraumatic. Neck: supple, symmetrical, trachea midline  Lungs: diminished breath sounds bilaterally  Heart: S1, S2 normal  Abdomen: abnormal findings:  soft nt  Extremities: edema trace  Neurologic: Mental status: alertness: alert        Assessment and Plan:      IMP:    1.   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

## 2022-04-05 NOTE — CARE COORDINATION
LSW met with patient following Care Conference. LSW informed patient of recommendation for a WC. Patient states he already has one at home. LSW then informed of recommendation for [facility] PT, OT, and Nursing (for pressure ulcer) and patient is agreeable to all. Patient verbalized understanding and would like to use [facility] due to positive experience previously. D/C Plan:  Date:  Apr. 14th  DME:  Patient has all recommended  HHC:  PT, OT, Nursing ([facility])  To: Home with son (family will transport)    Patient's son Soo Seals was present and asked for all updates to be shared with him as well. Patient verbalized agreement with this plan.

## 2022-04-05 NOTE — PROGRESS NOTES
Physical Therapy      [x] daily progress note       [] discharge       Patient Name:  Michelle Espinosa   :  1940 MRN: [id number]  Room:  [address] [place], [place] [address] Date of Admission: 3/30/2022  Rehabilitation Diagnosis:   Metabolic encephalopathy [D05.13]  Metabolic encephalopathy [E09.70]       Date 2022       Day of ARU Week:  7   Time IN/OUT 5431-3658  5180-9916   Individual Tx Minutes 60   Co-Treat Minutes 30  A cotreat was completed this date with  [] PT    [] OT   [x] ST      This pt requires simultaneous intervention of 2 skilled therapists to safely complete tasks to address complexity of deficits defined in the goals including:  [x]Attention   [] Safety    [x]Problem Solving    [x]Sequencing     [x]Initiation    [x]Processing      [x]Recall of learned tasks  [] Communication  [] Self Feeding   []ADL's    []UE Function    [x]Motor planning   [x]Balance  [x]Energy Conservation   [x]Verbal cues   [x] Tactile Cues  []Barriers     [x]Transfers   [x]Device Use     TOTAL Tx Time Mins 90   Variance Time    Variance Time []   Refusal due to:     []   Medical hold/reason:    []   Illness   []   Off Unit for test/procedure  []   Extra time needed to complete task  []   Therapeutic need  []   Other (specify):   Restrictions Restrictions/Precautions  Restrictions/Precautions: General Precautions,Fall Risk,Contact Precautions (suprapubic catheter)      Communication with other providers: [x]   OK to see per nursing:     []   Spoke with team member regarding:      Subjective observations and cognitive status: Pt resting in bed with student nsg in room, wanting to put on sacral border once pt in standing. Pt gets slightly agitated, not wanting to be overcued. PM: Pt up in [facility], son present, reports he will be unable to provide continuous supervision of pt at discharge, pt would need to be indep before return to home.  Pt was alert and willing to participate, needing to have BM during session req extra time   Pain level/location: No reports of pain in AM     Location:    Discharge recommendations  Anticipated discharge date: 4/14   Destination: []?home alone   []?home alone with assist PRN     [x]? home w/ family but alone many hours a day      []? Continuous supervision  []? SNF    []? Assisted living     []? Other:  Continued therapy: [x]? Tyrell Hodge PT  []? OUTPATIENT PT   []? No Further PT  []? SNF PT  Caregiver training recommended: []? Yes  []? No   Equipment needs: none anticipated, pt has rollator and 2WW     Bed Mobility:           []   Pt received out of bed   Rolling R/L:  Mod A  Scooting:  SBA sit scoot to EOB  Supine --> Sit:  CG-Min A from L SL  Bed features used: [] Yes    [] HOB elevated      [x] Bed rail                                    [] No     Transfers:    Sit--> Stand:  CG-SBA, cues for hand placement  Stand --> Sit:   CG-SBA, cues for hand placement, lining up to chair  Stand-Pivot:   CGA  Toilet transfer: CGA with grab bar; Toileting: Pt attempted to stand to clean bottom after BM, but reports still needing to go; Pt left in c/o STNA for rest of toileting task and transfer off toilet. Car Transfers:  CG-SBA for sit <>stand from seat, Min A to lift R LE into car, req extra time and cues  Assistive device required for transfer:   RW    Gait:    Distance:  26'+40'+20' / 16'+38'  Assistance:  CGA  Device:  RW  Gait Quality:   slow non-recip pattern, flexed knees and trunk, min cues to look up to amb to pre-stated goal.       Additional Therapeutic activities/exercises completed this date:     []   Nu-step:  Time:        Level:         #Steps:       []   Rebounder:    []  Seated     []  Standing        []   Balance training         []   Postural training    []   Supine ther ex (reps/sets):      [x]   Seated ther ex (reps/sets): B LE x 20 reps for LAQ, marching, hip abd/add, knee flexion, isometric hip add AA-AROM with visual cues for ex technique.      []   Standing ther ex (reps/sets):     []   Picked up object from floor []   Reacher used   []   Other:   []   Other:   []   Other:      Patient/Caregiver Education and Training:   [x]   Bed Mobility/Transfer technique/safety  [x]   Gait technique/sequencing  [x]   Proper use of assistive device  []   Advanced mobility safety and technique  []   Reinforced patient's precautions with mobility/functional tasks  [x]   Postural awareness  []   Family training  []   Other:    Treatment Plan for Next Session: bed mobility from flat surface, gait progression, transfer safety, amb on uneven surface, car transfer, object retrieval       Treatment/Activity Tolerance:   [x] Tolerated treatment with no adverse effects    [x] Patient limited by fatigue  [] Patient limited by pain   [] Patient limited by medical complications:    [] Adverse reaction to Tx:   [] Significant change in status    Safety:       []  bed alarm set    [x]  chair alarm set    []  Pt refused alarms                []  Telesitter activated      [x]  Gait belt used during tx session      []other:         Number of Minutes/Billable Intervention  Gait Training 35   Therapeutic Exercise 15   Neuro Re-Ed    Therapeutic Activity 40   Wheelchair Propulsion    Group    Other:    TOTAL 90         Social History  Social/Functional History  Lives With: Son  Type of Home: House  Home Layout: One level  Home Access: Ramped entrance  Bathroom Shower/Tub: Tub/Shower unit,Shower chair with back  Bathroom Toilet: Standard  Bathroom Equipment: Grab bars in shower,Shower chair  Bathroom Accessibility: Carlitos Drew accessible (states once he is in bathroom he uses countertops and grab bars, but pt not able to clarify if this is because there is little room or just preference)  Home Equipment: [address] walker (pt thinks there may be a BSC from his wife in storage, but unsure)  ADL Assistance: [address] [place] [address] Avenue: Independent  Homemaking Responsibilities: Yes  Meal Prep Responsibility: Secondary  Laundry Responsibility: Primary  Cleaning Responsibility: Secondary  Bill Paying/Finance Responsibility: Primary  Shopping Responsibility: Secondary  Health Care Management: Primary (has pill box with alarms per pt. until recently [facility] nursing would set up, but once [facility] stopped he managed on own)  Ambulation Assistance: Independent (mod I with 4ww for up to 150'. pt used electronic carts at stores when able)  Transfer Assistance: Independent  Active : No  Patient's  Info: 3 sons assist in driving. 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 Pt occasionally writes checks and some auto pay  Additional Comments: pt sleeps in flat bed. Pt has fallen >4 times in past year. Pt attributes many to low BS. No injury    Objective                                                                                    Goals:  (Update in navigator)   : Long term goals  Time Frame for Long term goals : 12-14 days  Long term goal 1: Pt will perform bed mobility with mod I  Long term goal 2: Pt will perform sit to stand and pivot transfers with mod I, car transfers with CGA  Long term goal 3: Pt will ambulate 10' with 2ww with mod I, up to 150' with supervision including uneven surfaces  Long term goal 4: Pt will ascend/descend curb step with 2ww and up to 4 steps with B rails with supervision  Long term goal 5: Pt will  object from floor with 2ww and reacher with supervision:        Plan of Care                                                                              Times per week: 5 days per week for a minimum of 60 minutes/day plus group as appropriate for 60 minutes.   Treatment to include Current Treatment Recommendations: Josephmeghann Zhang Mobility Training,Transfer Training,ADL/Self-care Training,Gait Training,Patient/Caregiver Education & Training,IADL Training,Stair training,Equipment Evaluation, Education, & procurement,Neuromuscular Re-education,Pain Management,Home Exercise [employer] Training,Safety Education & Training,Cognitive/Perceptual Training,Cognitive Reorientation    Electronically signed by   Dakota Bailey PTA #[id number]  4/5/2022, 10:14 AM

## 2022-04-05 NOTE — PROGRESS NOTES
Comprehensive Nutrition Assessment    Type and Reason for Visit:  Reassess    Nutrition Recommendations/Plan:   Continue current carb controlled diet with low potassium diet   Will continue to offer frozen oral nutrition supplement during stay  Encourage consistent meal intake  Will continue to follow up during stay     Nutrition Assessment:  Meal intake varying, % at recent meals. Remains on carb controlled diet with low potasssium diet. Receiving frozen oral nutrition supplement, will eat some of the supplements. Encouraged to continue increasing intake. Will continue to follow at moderate nutrition risk at this time. Malnutrition Assessment:  Malnutrition Status: At risk for malnutrition (Comment)    Context:  Acute Illness       Estimated Daily Nutrient Needs:  Energy (kcal):  0489-6813; Weight Used for Energy Requirements:  Current     Protein (g):  70-84 (1-1.2 g/kg); Weight Used for Protein Requirements:  Ideal        Fluid (ml/day):  2000; Method Used for Fluid Requirements:  1 ml/kcal      Nutrition Related Findings:  up in chair eatig lunch on visit today, increased lethargy at times, Abn renal labs followed by nephrology      Wounds:  Wound Consult Pending (red skin folds)       Current Nutrition Therapies:    ADULT ORAL NUTRITION SUPPLEMENT; Breakfast; Fortified Pudding Oral Supplement  ADULT ORAL NUTRITION SUPPLEMENT; Lunch, Dinner; Frozen Oral Supplement  ADULT DIET; Regular; 5 carb choices (75 gm/meal); Low Potassium (Less than 3000 mg/day)    Anthropometric Measures:  · Height: 5' 8\" (172.7 cm)  · Current Body Weight: 200 lb 13.4 oz (91.1 kg)   · Admission Body Weight: 201 lb 11.5 oz (91.5 kg)    · Usual Body Weight: 204 lb (92.5 kg) (per hx)     · Ideal Body Weight: 154 lbs; % Ideal Body Weight 133.1 %   · BMI: 30.5  · Adjusted Body Weight:  ; No Adjustment   · BMI Categories: Obese Class 1 (BMI 30.0-34. 9)       Nutrition Diagnosis:   · Predicted inadequate energy intake related to increase demand for energy/nutrients as evidenced by poor intake prior to admission      Nutrition Interventions:   Food and/or Nutrient Delivery:  Continue Current Diet,Continue Oral Nutrition Supplement  Nutrition Education/Counseling:  No recommendation at this time   Coordination of Nutrition Care:  Continue to monitor while inpatient    Goals:  Patient will consume at least 75% of meals during stay       Nutrition Monitoring and Evaluation:   Behavioral-Environmental Outcomes:  None Identified   Food/Nutrient Intake Outcomes:  Diet Advancement/Tolerance,Food and Nutrient Intake,Supplement Intake  Physical Signs/Symptoms Outcomes:  Biochemical Data,Meal Time Behavior,Skin,Weight     Discharge Planning:    Continue current diet     Electronically signed by Jenni Funez RD, LD on 4/5/22 at 12:51 PM EDT    Contact: [phone]

## 2022-04-06 LAB
ALBUMIN SERPL-MCNC: 3.3 GM/DL (ref 3.4–5)
ANION GAP SERPL CALCULATED.3IONS-SCNC: 16 MMOL/L (ref 4–16)
BACTERIA: NEGATIVE /HPF
BILIRUBIN URINE: NEGATIVE MG/DL
BLOOD, URINE: ABNORMAL
BUN BLDV-MCNC: 64 MG/DL (ref 6–23)
CALCIUM SERPL-MCNC: 9.2 MG/DL (ref 8.3–10.6)
CELLULAR CASTS: 12 /LPF
CHLORIDE BLD-SCNC: 102 MMOL/L (ref 99–110)
CLARITY: ABNORMAL
CO2: 19 MMOL/L (ref 21–32)
COLOR: COLORLESS
CREAT SERPL-MCNC: 2.5 MG/DL (ref 0.9–1.3)
GFR AFRICAN AMERICAN: 30 ML/MIN/1.73M2
GFR NON-AFRICAN AMERICAN: 25 ML/MIN/1.73M2
GLUCOSE BLD-MCNC: 105 MG/DL (ref 70–99)
GLUCOSE BLD-MCNC: 122 MG/DL (ref 70–99)
GLUCOSE BLD-MCNC: 152 MG/DL (ref 70–99)
GLUCOSE BLD-MCNC: 205 MG/DL (ref 70–99)
GLUCOSE BLD-MCNC: 277 MG/DL (ref 70–99)
GLUCOSE BLD-MCNC: 342 MG/DL (ref 70–99)
GLUCOSE BLD-MCNC: 77 MG/DL (ref 70–99)
GLUCOSE BLD-MCNC: 88 MG/DL (ref 70–99)
GLUCOSE, URINE: 100 MG/DL
HYALINE CASTS: 10 /LPF
KETONES, URINE: NEGATIVE MG/DL
LEUKOCYTE ESTERASE, URINE: ABNORMAL
MUCUS: ABNORMAL HPF
NITRITE URINE, QUANTITATIVE: NEGATIVE
PH, URINE: 6 (ref 5–8)
PHOSPHORUS: 5.4 MG/DL (ref 2.5–4.9)
POTASSIUM SERPL-SCNC: 4.9 MMOL/L (ref 3.5–5.1)
PROTEIN UA: NEGATIVE MG/DL
RBC URINE: 64 /HPF (ref 0–3)
SODIUM BLD-SCNC: 137 MMOL/L (ref 135–145)
SPECIFIC GRAVITY UA: 1.01 (ref 1–1.03)
SQUAMOUS EPITHELIAL: 4 /HPF
TRICHOMONAS: ABNORMAL /HPF
UROBILINOGEN, URINE: NORMAL MG/DL (ref 0.2–1)
WBC CLUMP: ABNORMAL /HPF
WBC UA: 544 /HPF (ref 0–2)
YEAST: ABNORMAL /HPF

## 2022-04-06 PROCEDURE — 87086 URINE CULTURE/COLONY COUNT: CPT

## 2022-04-06 PROCEDURE — 6360000002 HC RX W HCPCS: Performed by: INTERNAL MEDICINE

## 2022-04-06 PROCEDURE — 1280000000 HC REHAB R&B

## 2022-04-06 PROCEDURE — 6370000000 HC RX 637 (ALT 250 FOR IP): Performed by: INTERNAL MEDICINE

## 2022-04-06 PROCEDURE — 6370000000 HC RX 637 (ALT 250 FOR IP): Performed by: PHYSICAL MEDICINE & REHABILITATION

## 2022-04-06 PROCEDURE — 82962 GLUCOSE BLOOD TEST: CPT

## 2022-04-06 PROCEDURE — 81001 URINALYSIS AUTO W/SCOPE: CPT

## 2022-04-06 PROCEDURE — 94761 N-INVAS EAR/PLS OXIMETRY MLT: CPT

## 2022-04-06 PROCEDURE — 80069 RENAL FUNCTION PANEL: CPT

## 2022-04-06 PROCEDURE — 94150 VITAL CAPACITY TEST: CPT

## 2022-04-06 PROCEDURE — 2500000003 HC RX 250 WO HCPCS: Performed by: PHYSICAL MEDICINE & REHABILITATION

## 2022-04-06 PROCEDURE — 97530 THERAPEUTIC ACTIVITIES: CPT

## 2022-04-06 PROCEDURE — 36415 COLL VENOUS BLD VENIPUNCTURE: CPT

## 2022-04-06 PROCEDURE — 2580000003 HC RX 258: Performed by: INTERNAL MEDICINE

## 2022-04-06 PROCEDURE — 97535 SELF CARE MNGMENT TRAINING: CPT

## 2022-04-06 PROCEDURE — 97110 THERAPEUTIC EXERCISES: CPT

## 2022-04-06 RX ORDER — LANOLIN ALCOHOL/MO/W.PET/CERES
3 CREAM (GRAM) TOPICAL NIGHTLY PRN
Status: DISCONTINUED | OUTPATIENT
Start: 2022-04-06 | End: 2022-04-14 | Stop reason: HOSPADM

## 2022-04-06 RX ORDER — SODIUM BICARBONATE 650 MG/1
650 TABLET ORAL 3 TIMES DAILY
Status: DISCONTINUED | OUTPATIENT
Start: 2022-04-06 | End: 2022-04-14 | Stop reason: HOSPADM

## 2022-04-06 RX ADMIN — GLIPIZIDE 5 MG: 5 TABLET ORAL at 08:30

## 2022-04-06 RX ADMIN — DOXYCYCLINE HYCLATE 100 MG: 100 TABLET, FILM COATED ORAL at 22:09

## 2022-04-06 RX ADMIN — ACETAMINOPHEN 650 MG: 325 TABLET ORAL at 05:35

## 2022-04-06 RX ADMIN — HEPARIN SODIUM 5000 UNITS: 5000 INJECTION INTRAVENOUS; SUBCUTANEOUS at 13:15

## 2022-04-06 RX ADMIN — SODIUM CHLORIDE, PRESERVATIVE FREE 5 ML: 5 INJECTION INTRAVENOUS at 08:31

## 2022-04-06 RX ADMIN — GLIPIZIDE 5 MG: 5 TABLET ORAL at 17:19

## 2022-04-06 RX ADMIN — ALLOPURINOL 100 MG: 100 TABLET ORAL at 08:30

## 2022-04-06 RX ADMIN — PREGABALIN 50 MG: 50 CAPSULE ORAL at 22:09

## 2022-04-06 RX ADMIN — CARVEDILOL 3.12 MG: 6.25 TABLET, FILM COATED ORAL at 08:30

## 2022-04-06 RX ADMIN — SODIUM BICARBONATE 650 MG: 650 TABLET ORAL at 22:08

## 2022-04-06 RX ADMIN — CARVEDILOL 3.12 MG: 6.25 TABLET, FILM COATED ORAL at 17:18

## 2022-04-06 RX ADMIN — MICONAZOLE NITRATE: 2 POWDER TOPICAL at 22:09

## 2022-04-06 RX ADMIN — ACETAMINOPHEN 650 MG: 325 TABLET ORAL at 22:08

## 2022-04-06 RX ADMIN — SODIUM CHLORIDE, PRESERVATIVE FREE 10 ML: 5 INJECTION INTRAVENOUS at 22:08

## 2022-04-06 RX ADMIN — ASPIRIN 81 MG 81 MG: 81 TABLET ORAL at 08:30

## 2022-04-06 RX ADMIN — LEVOTHYROXINE SODIUM 50 MCG: 0.05 TABLET ORAL at 05:35

## 2022-04-06 RX ADMIN — ATORVASTATIN CALCIUM 40 MG: 40 TABLET, FILM COATED ORAL at 22:09

## 2022-04-06 RX ADMIN — HEPARIN SODIUM 5000 UNITS: 5000 INJECTION INTRAVENOUS; SUBCUTANEOUS at 22:09

## 2022-04-06 RX ADMIN — DOXYCYCLINE HYCLATE 100 MG: 100 TABLET, FILM COATED ORAL at 08:30

## 2022-04-06 RX ADMIN — TORSEMIDE 20 MG: 20 TABLET ORAL at 08:30

## 2022-04-06 RX ADMIN — MICONAZOLE NITRATE: 2 POWDER TOPICAL at 11:30

## 2022-04-06 RX ADMIN — PREGABALIN 50 MG: 50 CAPSULE ORAL at 08:30

## 2022-04-06 RX ADMIN — ACETAMINOPHEN 650 MG: 325 TABLET ORAL at 13:14

## 2022-04-06 RX ADMIN — HEPARIN SODIUM 5000 UNITS: 5000 INJECTION INTRAVENOUS; SUBCUTANEOUS at 05:35

## 2022-04-06 RX ADMIN — ALOGLIPTIN 6.25 MG: 6.25 TABLET, FILM COATED ORAL at 08:29

## 2022-04-06 RX ADMIN — SODIUM BICARBONATE 650 MG: 650 TABLET ORAL at 14:56

## 2022-04-06 ASSESSMENT — PAIN SCALES - GENERAL
PAINLEVEL_OUTOF10: 0
PAINLEVEL_OUTOF10: 5
PAINLEVEL_OUTOF10: 8
PAINLEVEL_OUTOF10: 0
PAINLEVEL_OUTOF10: 8

## 2022-04-06 ASSESSMENT — PAIN - FUNCTIONAL ASSESSMENT: PAIN_FUNCTIONAL_ASSESSMENT: ACTIVITIES ARE NOT PREVENTED

## 2022-04-06 ASSESSMENT — PAIN DESCRIPTION - LOCATION: LOCATION: ABDOMEN

## 2022-04-06 ASSESSMENT — PAIN DESCRIPTION - FREQUENCY: FREQUENCY: INTERMITTENT

## 2022-04-06 ASSESSMENT — PAIN DESCRIPTION - DESCRIPTORS: DESCRIPTORS: DISCOMFORT

## 2022-04-06 ASSESSMENT — PAIN DESCRIPTION - ORIENTATION: ORIENTATION: LEFT

## 2022-04-06 ASSESSMENT — PAIN DESCRIPTION - PROGRESSION: CLINICAL_PROGRESSION: NOT CHANGED

## 2022-04-06 ASSESSMENT — PAIN DESCRIPTION - PAIN TYPE: TYPE: ACUTE PAIN

## 2022-04-06 ASSESSMENT — PAIN DESCRIPTION - ONSET: ONSET: ON-GOING

## 2022-04-06 NOTE — PROGRESS NOTES
Joslyn Wheeler    : 1940  Acct #: [de-identified]  MRN: [id number]              PM&R Progress Note      Admitting diagnosis: ***    Comorbid diagnoses impacting rehabilitation: ***    Chief complaint: ***    Prior (baseline) level of function: Independent.     Current level of function:         Current  IRF-LESTER and Goals:   Occupational Therapy:    Short term goals  Time Frame for Short term goals: STGs=LTGs :   Long term goals  Time Frame for Long term goals : 10-14 days or until d/c  Long term goal 1: Pt will complete grooming tasks Ind seated  Long term goal 2: Pt will complete total body bathing c S  Long term goal 3: Pt will complete UB dressing c setup  Long term goal 4: Pt will complete LB dressing c supervision using AE PRN  Long term goal 5: Pt will doff/don footwear c supervision using AE PRN  Long term goals 6: Pt will complete toileting c S  Long term goal 7: Pt will complete functional transfers (bed, chair, toilet, shower) c DME PRN and S  Long term goal 8: Pt will perform therex/therax to facilitate increased strength/endurance/ax tolerance (c emphasis on dynamic standing balance >8 mins, BUE endurance, cognitive retraining) c SBA :                                       Eating: Eating  Assistance Needed: Setup or clean-up assistance  Comment: assist to open packages/containers  CARE Score: 5  Discharge Goal: Independent       Oral Hygiene: Oral Hygiene  Assistance Needed: Supervision or touching assistance  Comment: seated, increased time for object manipulation  CARE Score: 4  Discharge Goal: Independent    UB/LB Bathing: Shower/Bathe Self  Assistance Needed: Partial/moderate assistance  Comment: required assist to bathe bottom; educated pt on use of LHS; pt able to bathe distal BLEs, required assist to dry feet  CARE Score: 3  Discharge Goal: Supervision or touching assistance    UB Dressing: Upper Body Dressing  Assistance Needed: Partial/moderate assistance  Comment: able to thread BUEs into shirt, required assist to pull overhead, cues to pull down in front and back  CARE Score: 3  Discharge Goal: Set-up or clean-up assistance         LB Dressing: Lower Body Dressing  Assistance Needed: Substantial/maximal assistance  Comment: Pt required assist to thread mccrary bag into brief and pants, becoming increasingly agitated and confused during activity. Pt required min assist to manage over hips  CARE Score: 2  Discharge Goal: Supervision or touching assistance    Donning and [place] Footwear: Putting On/Taking Off Footwear  Assistance Needed: Substantial/maximal assistance  Comment: educated pt on use of sock aid; required max A to doff/don socks  CARE Score: 2  Discharge Goal: Supervision or touching assistance      Toileting: Toileting Hygiene  Assistance Needed: Substantial/maximal assistance  Comment: min A for pants management; Max A for bowel hygiene  CARE Score: 2  Discharge Goal: Supervision or touching assistance      Toilet Transfers:   Toilet Transfer  Assistance Needed: Supervision or touching assistance  Comment: CGA c RW requiring cues for safety  CARE Score: 4  Discharge Goal: Supervision or touching assistance    Physical Therapy:         Long term goals  Time Frame for Long term goals : 12-14 days  Long term goal 1: Pt will perform bed mobility with mod I  Long term goal 2: Pt will perform sit to stand and pivot transfers with mod I, car transfers with CGA  Long term goal 3: Pt will ambulate 10' with 2ww with mod I, up to 150' with supervision including uneven surfaces  Long term goal 4: Pt will ascend/descend curb step with 2ww and up to 4 steps with B rails with supervision  Long term goal 5: Pt will  object from floor with 2ww and reacher with supervision      Bed Mobility:   Sit to Lying  Assistance Needed: Substantial/maximal assistance  Comment: max assist for B LE, improved control of trunk  CARE Score: 2  Discharge Goal: Independent  Roll Left and Right  Assistance Needed: Substantial/maximal assistance  Comment: max assist with rails  CARE Score: 2  Discharge Goal: Independent  Lying to Sitting on Side of Bed  Assistance Needed: Partial/moderate assistance  Comment: mod assist for trunk  CARE Score: 3  Discharge Goal: Independent    Transfers:    Sit to Stand  Assistance Needed: Partial/moderate assistance  Comment: varies min to mod assist dependent on height of surface  Reason if not Attempted: Not attempted due to medical condition or safety concerns  CARE Score: 3  Discharge Goal: Independent  Chair/Bed-to-Chair Transfer  Assistance Needed: Partial/moderate assistance  Comment: mod assist with 2ww and max time allowed  Reason if not Attempted: Not attempted due to medical condition or safety concerns  CARE Score: 3  Discharge Goal: Independent  Toilet Transfer  Assistance Needed: Substantial/maximal assistance  Comment: max assist and max verbal cues for techique, posture, safety  CARE Score: 2  Car Transfer  Comment: unable to attempt due to time contraint as pt felt he needed to use toilet  Reason if not Attempted: Not attempted due to medical condition or safety concerns  CARE Score: 88  Discharge Goal: Supervision or touching assistance    Ambulation:    Walking Ability  Does the Patient Walk?: Yes     Walk 10 Feet  Assistance Needed: Partial/moderate assistance  Comment: min assist with 2ww and max time allowed, leaning to R with cues for L weightshift and R step length  Reason if not Attempted: Not attempted due to medical condition or safety concerns  CARE Score: 3  Discharge Goal: Independent     Walk 50 Feet with Two Turns  Reason if not Attempted: Not attempted due to medical condition or safety concerns  CARE Score: 88  Discharge Goal: Supervision or touching assistance     Walk 150 Feet  Reason if not Attempted: Not attempted due to medical condition or safety concerns  CARE Score: 88  Discharge Goal: Supervision or touching assistance     Walking 10 Feet on Uneven Surfaces  Reason if not Attempted: Not attempted due to medical condition or safety concerns  CARE Score: 88  Discharge Goal: Supervision or touching assistance     1 Step (Curb)  Reason if not Attempted: Not attempted due to medical condition or safety concerns  CARE Score: 88  Discharge Goal: Supervision or touching assistance     4 Steps  Reason if not Attempted: Not attempted due to medical condition or safety concerns  CARE Score: 88  Discharge Goal: Supervision or touching assistance     12 Steps  Reason if not Attempted: Not applicable  CARE Score: 9  Discharge Goal: Not Applicable       Wheelchair:  w/c Ability: Wheelchair Ability  Uses a Wheelchair and/or Scooter?: No  Wheel 50 Feet with Two Turns  Assistance Needed: Partial/moderate assistance  Comment: 30' max distance  CARE Score: 3             Balance:        Object: Picking Up Object  Assistance Needed: Substantial/maximal assistance  Comment: started with mod assist as he took off his L hand(due to leaning to R), and able to grasp object, then as pt bringing it up, lost balance posterior and needed max assist to recover  Reason if not Attempted: Not attempted due to medical condition or safety concerns  CARE Score: 2  Discharge Goal: Supervision or touching assistance    I      Exam:    Blood pressure (!) 151/71, pulse 82, temperature 99 °F (37.2 °C), temperature source Oral, resp. rate 16, height 5' 8\" (1.727 m), weight 200 lb 13.4 oz (91.1 kg), SpO2 100 %. General: ***    HEENT: ***    Pulmonary: ***    Cardiac: ***    Abdomen: Patient's abdomen is soft and nondistended. Bowel sounds were present throughout. There was no rebound, guarding or masses noted. Upper extremities: ***    Lower extremities: ***    Sitting balance was ***. Standing balance was ***.     Lab Results   Component Value Date    WBC 7.8 04/04/2022    HGB 8.7 (L) 04/04/2022    HCT 30.8 (L) 04/04/2022    .5 (H) 04/04/2022     04/04/2022     Lab Results Component Value Date    INR 1.20 03/17/2022    INR 0.99 05/19/2015    INR 1.31 03/23/2013    PROTIME 15.5 (H) 03/17/2022    PROTIME 11.3 05/19/2015    PROTIME 14.2 03/23/2013     Lab Results   Component Value Date    CREATININE 2.2 (H) 04/04/2022    BUN 54 (H) 04/04/2022     04/04/2022    K 5.0 04/04/2022     04/04/2022    CO2 19 (L) 04/04/2022     Lab Results   Component Value Date    ALT 12 04/04/2022    AST 14 (L) 04/04/2022    ALKPHOS 108 04/04/2022    BILITOT 0.2 04/04/2022       Expected length of stay  prior to a supervised level of function for discharge home with a walker and HHC OT/PT is ***    Recommendations:    ***

## 2022-04-06 NOTE — PROGRESS NOTES
Occupational Therapy    Physical Rehabilitation: OCCUPATIONAL THERAPY     [x] daily progress note       [] discharge       Patient Name:  Angel Velasquez   :  1940 MRN: [id number]  Room:  [address] [place] [address] Date of Admission: 3/30/2022  Rehabilitation Diagnosis:   Metabolic encephalopathy [K16.88]  Metabolic encephalopathy [D82.57]       Date 2022       Day of ARU Week:  1   Time IN/OUT 1005/1108  1303/1400   Individual Tx Minutes 63+57   Group Tx Minutes    Co-Treat Minutes    Concurrent Tx Minutes    TOTAL Tx Time Mins 120   Variance Time    Variance Time []   Refusal due to:     []   Medical hold/reason:    []   Illness   []   Off Unit for test/procedure  []   Extra time needed to complete task  []   Therapeutic need  []   Other (specify):   Restrictions Restrictions/Precautions: General Precautions,Fall Risk,Contact Precautions (suprapubic catheter)         Communication with other providers: [x]   OK to see per nursing:     [x]   Spoke with Debra Wilkins RN regarding:  Pt's request for Tylenol   Subjective observations and cognitive status: AM: Pt in bed, agreeable to ADL session. Pt referred to going home last night and returning to hospital; educated pt that he was at ARU all night    PM: Pt in bed, son assisting shaving pt's face. Son, therapist and RN all coordinating/expressing concerns about pt's ongoing lethargy and fatigue; RN to speak to MD     Pain level/location:    4/10       Location: L hip    Discharge recommendations  Anticipated discharge date:   Destination: []?home alone   []?home alone w assist prn   []? home w/ family    [x]? Continuous supervision       []? SNF    []? Assisted living     []? Other:   Continued therapy: [x]? [facility] OT  []? OUTPATIENT  OT   []?  No Further OT  Equipment needs: None       ADLs:         Oral Hygiene: Oral Hygiene  Assistance Needed: Supervision or touching assistance  Comment: seated, cues for thoroughness  CARE Score: 4  Discharge Goal: Independent    UB/LB Bathing: Shower/Bathe Self  Assistance Needed: Partial/moderate assistance  Comment: performed UB bathing seated, required assist to thoroughly bathe perineal area  CARE Score: 3  Discharge Goal: Supervision or touching assistance    UB Dressing: Upper Body Dressing  Assistance Needed: Partial/moderate assistance  Comment: able to thread BUEs in shirt, required assist to pull overhead  CARE Score: 3  Discharge Goal: Set-up or clean-up assistance         LB Dressing: Lower Body Dressing  Assistance Needed: Dependent  Comment: unable to trial reacher training today 2* fatigue and time constraints, required total A to thread BLEs in brief/pants (catheter also) and perform pants management up  CARE Score: 1  Discharge Goal: Supervision or touching assistance    Donning and [place] Footwear: Putting On/Taking Off Footwear  Assistance Needed: Dependent  Comment: total A to doff/don hospital socks  CARE Score: 1  Discharge Goal: Supervision or touching assistance      Toileting: Toileting Hygiene  Assistance Needed: Substantial/maximal assistance  Comment: able to manage Depends down, required assist for thorough bowel hygiene and pants management up 2* fatigue  CARE Score: 2  Discharge Goal: Supervision or touching assistance      Toilet Transfers: Toilet Transfer  Assistance Needed: Partial/moderate assistance  Comment:  Mod A to<>from W/C, pt with poor BLE extension and very flexed posture, not fully turning to W/C 2* fatigue  CARE Score: 3  Discharge Goal: Supervision or touching assistance  Device Used:    []   Standard Toilet         [x]   Grab Bars           []  Bedside Commode       []   Elevated Toilet          []   Other:        Bed Mobility:           []   Pt received out of bed   Supine --> Sit:  Max A, poor initiation and mechanics      Transfers:    Sit--> Stand:   AM: Min A  PM: CGA  Stand --> Sit:  Min A  Stand-Pivot:   In AM: ranged from min-mod A  Other:    Assistive device required for transfer:   W/C Functional Mobility:    Assistance:  CGA ~15 ft in PM; too fatigued in AM  Device:   [x]   Rolling Walker     []   Standard [place] []   Wheelchair        []   [place]       []   4-Wheeled [place]         []   Cardiac Walker       []   Other:         Additional Therapeutic activities/exercises completed this date:     [x]   ADL Training   [x]   Balance/Postural training: Pt stood x 2  min at RW with CGA while completing dynamic stand task while reaching outside base of support to facilitate increased balance for ADL tasks and transfers. [x]   Bed/Transfer Training   [x]   Endurance Training   []   Neuromuscular Re-ed   []   Nu-step:  Time:        Level:         #Steps:       []   Rebounder:    []  Seated     []  Standing        []   Supine Ther Ex (reps/sets):     [x]   Seated Ther Ex (reps/sets): To increase BUE endurance for ADLs and transfers pt completed 5 sets x10 reps on rickshaw c 20#, cues for improved end range with fatigue    []   Standing Ther Ex (reps/sets):     []   Other:      Comments: All intervention performed to increase pt's endurance, ax tolerance, balance,  and I c ADLs/IADLs and functional transfers/mobility. Patient/Caregiver Education and Training:   []   [employer] Equipment Use  [x]   Bed Mobility/Transfer Technique/Safety  []   Energy Conservation Tips  []   Family training  [x]   Postural Awareness  [x]   Safety During Functional Activities  []   Reinforced Patient's Precautions   []   Progress was updated and reviewed in Rehabtracker with patient and/or family this         date.     Treatment Plan for Next Session: Continue OT POC      Assessment:  Unfortunately pt has yet to make significant progress with ADLs 2* ongoing fatigue, weakness, and decreased cognition      Treatment/Activity Tolerance:   [] Tolerated treatment with no adverse effects    [x] Patient limited by fatigue  [] Patient limited by pain   [] Patient limited by medical complications:    [] Adverse reaction to Tx:   [] Significant change in status    Safety:       []  bed alarm set    [x]  chair alarm set; handoff to PTA in PM    []  Pt refused alarms                []  Telesitter activated      [x]  Gait belt used during tx session      []other:       Number of Minutes/Billable Intervention  Therapeutic Exercise 15   ADL Self-care 60   Neuro Re-Ed    Therapeutic Activity 45   Group    Other:    TOTAL 120       Social History  Social/Functional History  Lives With: Son  Type of Home: House  Home Layout: One level  Home Access: Ramped entrance  Bathroom Shower/Tub: Tub/Shower unit,Shower chair with back  Bathroom Toilet: Standard  Bathroom Equipment: Grab bars in shower,Shower chair  Bathroom Accessibility: Floridalma Marquez accessible (states once he is in bathroom he uses countertops and grab bars, but pt not able to clarify if this is because there is little room or just preference)  Home Equipment: [address] walker (pt thinks there may be a BSC from his wife in storage, but unsure)  ADL Assistance: Independent  Homemaking Assistance: Independent  Homemaking Responsibilities: Yes  Meal Prep Responsibility: Secondary  Laundry Responsibility: Primary  Cleaning Responsibility: Secondary  Bill Paying/Finance Responsibility: Primary  Shopping Responsibility: Secondary  Health Care Management: Primary (has pill box with alarms per pt. until recently [address] would set up, but once [address] stopped he managed on own)  Ambulation Assistance: Independent (mod I with 4ww for up to 150'. pt used electronic carts at stores when able)  Transfer Assistance: Independent  Active : No  Patient's  Info: 3 sons assist in driving. Mode of Transportation: Car,SUV  Occupation: Retired  Type of occupation: Retired from [address] [place] [address] Street: TV, very little reading, no pets, doctors, son manages groceries,  Meds are managed via pill dispenser that son manages.   Pt occasionally writes checks and some auto pay  Additional Comments: pt sleeps in flat bed. Pt has fallen >4 times in past year. Pt attributes many to low BS. No injury    Objective                                                                                    Goals:  (Update in navigator)  Short term goals  Time Frame for Short term goals: STGs=LTGs:  Long term goals  Time Frame for Long term goals : 10-14 days or until d/c  Long term goal 1: Pt will complete grooming tasks Ind seated  Long term goal 2: Pt will complete total body bathing c S  Long term goal 3: Pt will complete UB dressing c setup  Long term goal 4: Pt will complete LB dressing c supervision using AE PRN  Long term goal 5: Pt will doff/don footwear c supervision using AE PRN  Long term goals 6: Pt will complete toileting c S  Long term goal 7: Pt will complete functional transfers (bed, chair, toilet, shower) c DME PRN and S  Long term goal 8: Pt will perform therex/therax to facilitate increased strength/endurance/ax tolerance (c emphasis on dynamic standing balance >8 mins, BUE endurance, cognitive retraining) c SBA:        Plan of Care                                                                              Times per week: 5 days per week for a minimum of 60 minutes/day plus group as appropriate for 60 minutes. Treatment to include Plan  Times per day: Daily  Current Treatment Recommendations: Strengthening,ROM,Balance Training,Functional Mobility Training,Endurance [employer] Management,Safety Education & Training,Patient/Caregiver Education & Training,Equipment Evaluation, Education, & procurement,Self-Care / ADL,Home Management Training,Cognitive/Perceptual Training    Electronically signed by   Claudette Grief, MS, OTR/L  License #[id number]  4/6/2022, 2:06 PM

## 2022-04-06 NOTE — PROGRESS NOTES
Progress Note( Dr. Tavon Burns)  4/5/2022  Subjective:   Admit Date: 3/30/2022  PCP: Fern Canseco MD    Admitted For : Patient was initially admitted to medical floor for sepsis from UTI and generalized  weakness    Consulted For: Better control of blood glucose    Interval History: Patient was transferred from medical floor on 3/30/2022 and was admitted to acute rehab unit for rehabilitation and physical therapy  Blood sugar control running a bit higher blood glucose        Denies any chest pains,   Denies SOB . Denies nausea or vomiting. No new bowel or bladder symptoms.        Intake/Output Summary (Last 24 hours) at 4/5/2022 2342  Last data filed at 4/5/2022 2033  Gross per 24 hour   Intake 140 ml   Output 1250 ml   Net -1110 ml       DATA    CBC:   Recent Labs     04/04/22  1300   WBC 7.8   HGB 8.7*       CMP:  Recent Labs     04/04/22  1300      K 5.0      CO2 19*   BUN 54*   CREATININE 2.2*   CALCIUM 9.6   PROT 6.2*   LABALBU 3.5   BILITOT 0.2   ALKPHOS 108   AST 14*   ALT 12     Lipids: No results found for: CHOL, HDL, TRIG  Glucose:  Recent Labs     04/05/22  1138 04/05/22  1626 04/05/22  2213   POCGLU 232* 203* 238*     [id number]:  Lab Results   Component Value Date    LABA1C 9.2 03/22/2022     High Sensitivity TSH:   Lab Results   Component Value Date    TSHHS 6.190 03/25/2022     Free T3: No results found for: FT3  Free T4:  Lab Results   Component Value Date    T4FREE 1.15 03/27/2022       No orders to display        Scheduled Medicines   Medications:    glipiZIDE  5 mg Oral BID AC    insulin lispro  0-18 Units SubCUTAneous TID WC    insulin lispro  0-9 Units SubCUTAneous Nightly    miconazole   Topical BID    torsemide  20 mg Oral Daily    [Held by provider] insulin glargine  10 Units SubCUTAneous Nightly    heparin (porcine)  5,000 Units SubCUTAneous 3 times per day    sodium chloride flush  5-40 mL IntraVENous 2 times per day    allopurinol  100 mg Oral Daily    alogliptin  6.25 mg Oral Daily    aspirin  81 mg Oral Daily    atorvastatin  40 mg Oral Nightly    carvedilol  3.125 mg Oral BID WC    levothyroxine  50 mcg Oral Daily    mirtazapine  15 mg Oral Nightly    pregabalin  50 mg Oral BID    doxycycline hyclate  100 mg Oral 2 times per day      Infusions:    dextrose           Objective:   Vitals: BP (!) 153/62   Pulse 84   Temp 98.2 °F (36.8 °C) (Oral)   Resp 15   Ht 5' 8\" (1.727 m)   Wt 200 lb 13.4 oz (91.1 kg)   SpO2 100%   BMI 30.54 kg/m²   General appearance: alert and cooperative with exam  Neck: no JVD or bruit  Thyroid : Normal lobes   Lungs: Has Vesicular Breath sounds   Heart:  regular rate and rhythm  Abdomen: soft, non-tender; bowel sounds normal; no masses,  no organomegaly has Surapubic Catheter   Musculoskeletal: Normal  Extremities: extremities normal, , no edema  Neurologic:  Awake, alert, oriented to name, place and time. Cranial nerves II-XII are grossly intact. Motor weakness . Sensory is intact. ,  and gait is abnormal.unstable     Assessment:     Patient Active Problem List:     Gait disturbance     Generalized weakness     Diabetes mellitus (HCC)     Osteoarthritis     Anemia of chronic disorder     Infected implanted bladder sphincter cuff     Escherichia coli urinary tract infection     Hypertension     Sepsis ([address]     Type 2 diabetes mellitus with hypoglycemia without coma ([address]     UTI (urinary tract infection) due to urinary indwelling catheter (HCC)     Hyperkalemia     Acute kidney failure (HCC)     Leg weakness, bilateral     Type 2 diabetes mellitus with neurological manifestations, uncontrolled ([address]     Complicated UTI (urinary tract infection)     Essential hypertension     Severe muscle deconditioning     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 (HCC)     Frequent falls     Chronic kidney disease, stage 3a ([address]     Uncontrolled type 2 diabetes mellitus with peripheral neuropathy ([address]     Prostate cancer ([address] Suprapubic catheter ([facility]     Sepsis secondary to UTI ([facility]      Plan:     1. Reviewed POC blood glucose . Labs and X ray results   2. Reviewed Current Medicines   3. On meal/ Correction bolus Humalog/ Basal Lantus Insulin. On hold for now regime / and   4. Monitor Blood glucose frequently   5. Modified  the dose of Insulin/ other medicines as needed  6. On low-dose Synthroid of 50 mg/day  7. Patient was transferred to acute rehab unit on 3/30//2022   8. Patient to be participating in physical therapy and Occupational Therapy of acute rehab unit  9. Will follow     .      Karlyn Lefort, MD, MD

## 2022-04-06 NOTE — PROGRESS NOTES
Physical Therapy      [x] daily progress note       [] discharge       Patient Name:  Yovana Dewey   :  1940 MRN: [id number]  Room:  [address] [place] [address] Date of Admission: 3/30/2022  Rehabilitation Diagnosis:   Metabolic encephalopathy [D43.44]  Metabolic encephalopathy [K93.52]       Date 2022       Day of ARU Week:  1   Time IN/OUT 7431-4299   Individual Tx Minutes 52   TOTAL Tx Time Mins 52   Variance Time -8   Variance Time []   Refusal due to:     []   Medical hold/reason:    []   Illness   []   Off Unit for test/procedure  []   Extra time needed to complete task  []   Therapeutic need  [x]   Other (specify): Pt very weak/lethargic at end of session, falling asleep in sitting, decreased problem solving and initiation, unable to participate further in session    Restrictions Restrictions/Precautions  Restrictions/Precautions: General Precautions,Fall Risk,Contact Precautions (suprapubic catheter)      Communication with other providers: [x]   OK to see per nursing:     []   Spoke with team member regarding:      Subjective observations and cognitive status: Pt received in gym by OT, pt willing to participate; Pt reports needing to go to the BR, therapist A pt back to room, req extra time. Pain level/location: 0/10       Location:   Discharge recommendations  Anticipated discharge date:    Destination: []??home alone   []? ?home alone with assist PRN     [x]? ? home w/ family but alone many hours a day      []? ? Continuous supervision  []? ?SNF    []? ? Assisted living     []? ? Other:  Continued therapy: [x]? ?C PT  []??OUTPATIENT PT   []? ? No Further PT  []? ?SNF PT  Caregiver training recommended: []? ?Yes  []? ? No   Equipment needs: none anticipated, pt has rollator and 2WW     Bed Mobility:           []   Pt received out of bed   Scooting:  Dep x 2 to HOB   Sit --> Supine:  Max A LEs and mod A trunk  Bed features used: [] Yes    [] HOB elevated      [] Bed rail                                    [x] No     Transfers:    Sit--> Stand:  Min A from WC  Stand --> Sit:  Max A for controlled descent  Toilet transfer: CG-Min A, mod safety cues to not abandon AD and reach for rail when not completely to commode; req mod A with grab bar to stand from toilet. Toileting: Dependent for task due to poor problem solving and req min A for balance with task, max cues for increased trunk flexion due to excessive lean  Stand-Pivot:    Mod A, max safety cues due to attempting to sit uncontrolled before getting completely to bed  Assistive device required for transfer:   RW    Gait:    Distance:  10'  Assistance:  CGA  Device:  RW  Gait Quality:   flexed knees and trunk first couple steps, improved after cueing when amb to BR from [facility];  pt unable to walk from BR to bed due to weakness, lethargy, inability to maintain extension in standing     Patient/Caregiver Education and Training:   [x]   Bed Mobility/Transfer technique/safety  [x]   Gait technique/sequencing  [x]   Proper use of assistive device  []   Advanced mobility safety and technique  []   Reinforced patient's precautions with mobility/functional tasks  [x]   Postural awareness  []   Family training  []   Other:    Treatment Plan for Next Session: gait progression, amb on uneven surface, LE strengthening, transfers       Treatment/Activity Tolerance:   [x] Tolerated treatment with no adverse effects    [] Patient limited by fatigue  [] Patient limited by pain   [] Patient limited by medical complications:    [] Adverse reaction to Tx:   [] Significant change in status    Safety:       [x]  bed alarm set    []  chair alarm set    []  Pt refused alarms                []  Telesitter activated      [x]  Gait belt used during tx session      []other:         Number of Minutes/Billable Intervention  Gait Training    Therapeutic Exercise    Neuro Re-Ed    Therapeutic Activity 52   Wheelchair Propulsion    Group    Other:    TOTAL 52         Social History  Social/Functional History  Lives With: Son  Type of Home: House  Home Layout: One level  Home Access: Ramped entrance  Bathroom Shower/Tub: Tub/Shower unit,Shower chair with back  Bathroom Toilet: Standard  Bathroom Equipment: Grab bars in shower,Shower chair  Bathroom Accessibility: [place] accessible (states once he is in bathroom he uses countertops and grab bars, but pt not able to clarify if this is because there is little room or just preference)  Home Equipment: [address] walker (pt thinks there may be a BSC from his wife in storage, but unsure)  ADL Assistance: Independent  Homemaking Assistance: Independent  Homemaking Responsibilities: Yes  Meal Prep Responsibility: Secondary  Laundry Responsibility: Primary  Cleaning Responsibility: Secondary  Bill Paying/Finance Responsibility: Primary  Shopping Responsibility: Secondary  Health Care Management: Primary (has pill box with alarms per pt. until recently [address] would set up, but once [facility] stopped he managed on own)  Ambulation Assistance: Independent (mod I with 4ww for up to 150'. pt used electronic carts at stores when able)  Transfer Assistance: Independent  Active : No  Patient's  Info: 3 sons assist in driving. Mode of Transportation: Car,SUV  Occupation: Retired  Type of occupation: Retired from [address] [place] [address] Street: TV, very little reading, no pets, doctors, son manages groceries,  Meds are managed via pill dispenser that son manages. Pt occasionally writes checks and some auto pay  Additional Comments: pt sleeps in flat bed. Pt has fallen >4 times in past year. Pt attributes many to low BS. No injury    Objective                                                                                    Goals:  (Update in navigator)   :   Long term goals  Time Frame for Long term goals : 12-14 days  Long term goal 1: Pt will perform bed mobility with mod I  Long term goal 2: Pt will perform sit to stand and pivot transfers with mod I, car transfers with CGA  Long term goal 3: Pt will ambulate 10' with 2ww with mod I, up to 150' with supervision including uneven surfaces  Long term goal 4: Pt will ascend/descend curb step with 2ww and up to 4 steps with B rails with supervision  Long term goal 5: Pt will  object from floor with 2ww and reacher with supervision:        Plan of Care                                                                              Times per week: 5 days per week for a minimum of 60 minutes/day plus group as appropriate for 60 minutes.   Treatment to include Current Treatment Recommendations: Strengthening,ROM,Balance Training,Functional Mobility Training,Transfer Training,ADL/Self-care Training,Gait Training,Patient/Caregiver Education & Training,IADL Training,Stair training,Equipment Evaluation, Education, & procurement,Neuromuscular Re-education,Pain Management,Home Exercise [facility] Training,Safety Education & Training,Cognitive/Perceptual Training,Cognitive Reorientation    Electronically signed by   Romy Matute PTA #[id number]  4/6/2022, 8:46 AM

## 2022-04-06 NOTE — PROGRESS NOTES
Viviana Carty    : 1940  Acct #: [de-identified]  MRN: [id number]              PM&R Progress Note      Admitting diagnosis: ***    Comorbid diagnoses impacting rehabilitation: ***    Chief complaint: ***    Prior (baseline) level of function: Independent.     Current level of function:         Current  IRF-LESTER and Goals:   Occupational Therapy:    Short term goals  Time Frame for Short term goals: STGs=LTGs :   Long term goals  Time Frame for Long term goals : 10-14 days or until d/c  Long term goal 1: Pt will complete grooming tasks Ind seated  Long term goal 2: Pt will complete total body bathing c S  Long term goal 3: Pt will complete UB dressing c setup  Long term goal 4: Pt will complete LB dressing c supervision using AE PRN  Long term goal 5: Pt will doff/don footwear c supervision using AE PRN  Long term goals 6: Pt will complete toileting c S  Long term goal 7: Pt will complete functional transfers (bed, chair, toilet, shower) c DME PRN and S  Long term goal 8: Pt will perform therex/therax to facilitate increased strength/endurance/ax tolerance (c emphasis on dynamic standing balance >8 mins, BUE endurance, cognitive retraining) c SBA :                                       Eating: Eating  Assistance Needed: Setup or clean-up assistance  Comment: assist to open packages/containers  CARE Score: 5  Discharge Goal: Independent       Oral Hygiene: Oral Hygiene  Assistance Needed: Supervision or touching assistance  Comment: seated, cues for thoroughness  CARE Score: 4  Discharge Goal: Independent    UB/LB Bathing: Shower/Bathe Self  Assistance Needed: Partial/moderate assistance  Comment: performed UB bathing seated, required assist to thoroughly bathe perineal area  CARE Score: 3  Discharge Goal: Supervision or touching assistance    UB Dressing: Upper Body Dressing  Assistance Needed: Partial/moderate assistance  Comment: able to thread BUEs in shirt, required assist to pull overhead  CARE Score: 3  Discharge Goal: Set-up or clean-up assistance         LB Dressing: Lower Body Dressing  Assistance Needed: Dependent  Comment: unable to trial reacher training today 2* fatigue and time constraints, required total A to thread BLEs in brief/pants (catheter also) and perform pants management up  CARE Score: 1  Discharge Goal: Supervision or touching assistance    Donning and [place] Footwear: Putting On/Taking Off Footwear  Assistance Needed: Dependent  Comment: total A to doff/don hospital socks  CARE Score: 1  Discharge Goal: Supervision or touching assistance      Toileting: Toileting Hygiene  Assistance Needed: Substantial/maximal assistance  Comment: able to manage Depends down, required assist for thorough bowel hygiene and pants management up 2* fatigue  CARE Score: 2  Discharge Goal: Supervision or touching assistance      Toilet Transfers: Toilet Transfer  Assistance Needed: Partial/moderate assistance  Comment:  Mod A to<>from W/C, pt with poor BLE extension and very flexed posture, not fully turning to W/C 2* fatigue  CARE Score: 3  Discharge Goal: Supervision or touching assistance    Physical Therapy:         Long term goals  Time Frame for Long term goals : 12-14 days  Long term goal 1: Pt will perform bed mobility with mod I  Long term goal 2: Pt will perform sit to stand and pivot transfers with mod I, car transfers with CGA  Long term goal 3: Pt will ambulate 10' with 2ww with mod I, up to 150' with supervision including uneven surfaces  Long term goal 4: Pt will ascend/descend curb step with 2ww and up to 4 steps with B rails with supervision  Long term goal 5: Pt will  object from floor with 2ww and reacher with supervision      Bed Mobility:   Sit to Lying  Assistance Needed: Substantial/maximal assistance  Comment: max assist for B LE, improved control of trunk  CARE Score: 2  Discharge Goal: Independent  Roll Left and Right  Assistance Needed: Substantial/maximal assistance  Comment: max assist with rails  CARE Score: 2  Discharge Goal: Independent  Lying to Sitting on Side of Bed  Assistance Needed: Partial/moderate assistance  Comment: mod assist for trunk  CARE Score: 3  Discharge Goal: Independent    Transfers:    Sit to Stand  Assistance Needed: Partial/moderate assistance  Comment: varies min to mod assist dependent on height of surface  Reason if not Attempted: Not attempted due to medical condition or safety concerns  CARE Score: 3  Discharge Goal: Independent  Chair/Bed-to-Chair Transfer  Assistance Needed: Partial/moderate assistance  Comment: mod assist with 2ww and max time allowed  Reason if not Attempted: Not attempted due to medical condition or safety concerns  CARE Score: 3  Discharge Goal: Independent  Toilet Transfer  Assistance Needed: Substantial/maximal assistance  Comment: max assist and max verbal cues for techique, posture, safety  CARE Score: 2  Car Transfer  Comment: unable to attempt due to time contraint as pt felt he needed to use toilet  Reason if not Attempted: Not attempted due to medical condition or safety concerns  CARE Score: 88  Discharge Goal: Supervision or touching assistance    Ambulation:    Walking Ability  Does the Patient Walk?: Yes     Walk 10 Feet  Assistance Needed: Partial/moderate assistance  Comment: min assist with 2ww and max time allowed, leaning to R with cues for L weightshift and R step length  Reason if not Attempted: Not attempted due to medical condition or safety concerns  CARE Score: 3  Discharge Goal: Independent     Walk 50 Feet with Two Turns  Reason if not Attempted: Not attempted due to medical condition or safety concerns  CARE Score: 88  Discharge Goal: Supervision or touching assistance     Walk 150 Feet  Reason if not Attempted: Not attempted due to medical condition or safety concerns  CARE Score: 88  Discharge Goal: Supervision or touching assistance     Walking 10 Feet on Uneven Surfaces  Reason if not Attempted: Not attempted due to medical condition or safety concerns  CARE Score: 88  Discharge Goal: Supervision or touching assistance     1 Step (Curb)  Reason if not Attempted: Not attempted due to medical condition or safety concerns  CARE Score: 88  Discharge Goal: Supervision or touching assistance     4 Steps  Reason if not Attempted: Not attempted due to medical condition or safety concerns  CARE Score: 88  Discharge Goal: Supervision or touching assistance     12 Steps  Reason if not Attempted: Not applicable  CARE Score: 9  Discharge Goal: Not Applicable       Wheelchair:  w/c Ability: Wheelchair Ability  Uses a Wheelchair and/or Scooter?: No  Wheel 50 Feet with Two Turns  Assistance Needed: Partial/moderate assistance  Comment: 30' max distance  CARE Score: 3             Balance:        Object: Picking Up Object  Assistance Needed: Substantial/maximal assistance  Comment: started with mod assist as he took off his L hand(due to leaning to R), and able to grasp object, then as pt bringing it up, lost balance posterior and needed max assist to recover  Reason if not Attempted: Not attempted due to medical condition or safety concerns  CARE Score: 2  Discharge Goal: Supervision or touching assistance    I      Exam:    Blood pressure 115/60, pulse 83, temperature 98.3 °F (36.8 °C), temperature source Oral, resp. rate 18, height 5' 8\" (1.727 m), weight 197 lb 15.6 oz (89.8 kg), SpO2 100 %. General: ***    HEENT: ***    Pulmonary: ***    Cardiac: ***    Abdomen: Patient's abdomen is soft and nondistended. Bowel sounds were present throughout. There was no rebound, guarding or masses noted. Upper extremities: ***    Lower extremities: ***    Sitting balance was ***. Standing balance was ***.     Lab Results   Component Value Date    WBC 7.8 04/04/2022    HGB 8.7 (L) 04/04/2022    HCT 30.8 (L) 04/04/2022    .5 (H) 04/04/2022     04/04/2022     Lab Results   Component Value Date    INR 1.20 03/17/2022    INR 0.99 05/19/2015    INR 1.31 03/23/2013    PROTIME 15.5 (H) 03/17/2022    PROTIME 11.3 05/19/2015    PROTIME 14.2 03/23/2013     Lab Results   Component Value Date    CREATININE 2.5 (H) 04/06/2022    BUN 64 (H) 04/06/2022     04/06/2022    K 4.9 04/06/2022     04/06/2022    CO2 19 (L) 04/06/2022     Lab Results   Component Value Date    ALT 12 04/04/2022    AST 14 (L) 04/04/2022    ALKPHOS 108 04/04/2022    BILITOT 0.2 04/04/2022       Expected length of stay  prior to a supervised level of function for discharge home with a walker and HHC OT/PT is ***    Recommendations:    ***

## 2022-04-06 NOTE — PROGRESS NOTES
Nephrology Progress Note  4/6/2022 2:32 PM  Subjective: Interval History: Alexander [place] is a 78 y.o. male somewhat tired and weak today resting in bed    Data:   Scheduled Meds:   insulin lispro  0-6 Units SubCUTAneous Nightly    glipiZIDE  5 mg Oral BID AC    insulin lispro  0-18 Units SubCUTAneous TID WC    miconazole   Topical BID    torsemide  20 mg Oral Daily    [Held by provider] insulin glargine  10 Units SubCUTAneous Nightly    heparin (porcine)  5,000 Units SubCUTAneous 3 times per day    sodium chloride flush  5-40 mL IntraVENous 2 times per day    allopurinol  100 mg Oral Daily    alogliptin  6.25 mg Oral Daily    aspirin  81 mg Oral Daily    atorvastatin  40 mg Oral Nightly    carvedilol  3.125 mg Oral BID WC    levothyroxine  50 mcg Oral Daily    mirtazapine  15 mg Oral Nightly    pregabalin  50 mg Oral BID    doxycycline hyclate  100 mg Oral 2 times per day     Continuous Infusions:   dextrose           CBC   Recent Labs     04/04/22  1300   WBC 7.8   HGB 8.7*   HCT 30.8*         BMP   Recent Labs     04/04/22  1300 04/06/22  0712    137   K 5.0 4.9    102   CO2 19* 19*   PHOS 5.2* 5.4*   BUN 54* 64*   CREATININE 2.2* 2.5*     Hepatic:   Recent Labs     04/04/22  1300   AST 14*   ALT 12   BILITOT 0.2   ALKPHOS 108     Troponin: No results for input(s): TROPONINI in the last 72 hours. BNP: No results for input(s): BNP in the last 72 hours. Lipids: No results for input(s): CHOL, HDL in the last 72 hours. Invalid input(s): LDLCALCU  ABGs: No results found for: PHART, PO2ART, PYJ6VWP  INR: No results for input(s): INR in the last 72 hours.   Renal Labs  Albumin:    Lab Results   Component Value Date    LABALBU 3.3 04/06/2022     Calcium:    Lab Results   Component Value Date    CALCIUM 9.2 04/06/2022     Phosphorus:    Lab Results   Component Value Date    PHOS 5.4 04/06/2022     U/A:    Lab Results   Component Value Date    NITRU NEGATIVE 03/29/2022    NITRU NEGATIVE 03/23/2013    COLORU YELLOW 03/29/2022    WBCUA NONE SEEN 03/29/2022    RBCUA 56 03/29/2022    MUCUS RARE 03/29/2022    TRICHOMONAS NONE SEEN 03/29/2022    YEAST MANY 03/29/2022    BACTERIA NEGATIVE 03/29/2022    CLARITYU CLOUDY 03/29/2022    SPECGRAV 1.015 03/29/2022    UROBILINOGEN 0.2 03/29/2022    BILIRUBINUR NEGATIVE 03/29/2022    BLOODU LARGE 03/29/2022    GLUCOSEU 1,000 03/23/2013    KETUA NEGATIVE 03/29/2022    AMORPHOUS RARE 11/16/2021     ABG:  No results found for: PHART, RGF6MYJ, PO2ART, UQW7UIQ, BEART, THGBART, HLB3LRR, T6FFCQVX  HgBA1c:    Lab Results   Component Value Date    LABA1C 9.2 03/22/2022     Microalbumen/Creatinine ratio:  No components found for: RUCREAT  TSH:  No results found for: TSH  IRON:    Lab Results   Component Value Date    IRON 15 03/22/2022     Iron Saturation:  No components found for: PERCENTFE  TIBC:    Lab Results   Component Value Date    TIBC 214 03/22/2022     FERRITIN:    Lab Results   Component Value Date    FERRITIN 352 03/22/2022     RPR:  No results found for: RPR  MITALI:  No results found for: ANATITER, MITALI  24 Hour Urine for Creatinine Clearance:  No components found for: CREAT4, UHRS10, UTV10      Objective:   I/O: 04/05 0701 - 04/06 0700  In: 260 [P.O.:260]  Out: 1325 [Urine:1325]  I/O last 3 completed shifts: In: 300 [P.O.:300]  Out: 3000 [Urine:3000]  I/O this shift:  In: 137 [P.O.:137]  Out: -   Vitals: BP (!) 149/57   Pulse 72   Temp 98.1 °F (36.7 °C) (Oral)   Resp 16   Ht 5' 8\" (1.727 m)   Wt 197 lb 15.6 oz (89.8 kg)   SpO2 98%   BMI 30.10 kg/m²  {  General appearance: awake weak  HEENT: Head: Normal, normocephalic, atraumatic. Neck: supple, symmetrical, trachea midline  Lungs: diminished breath sounds bilaterally  Heart: S1, S2 normal  Abdomen: abnormal findings:  soft nt  Extremities: edema trace  Neurologic: Mental status: alertness: Weak tired        Assessment and Plan:      IMP:    1.   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

## 2022-04-07 LAB
CULTURE: ABNORMAL
CULTURE: ABNORMAL
GLUCOSE BLD-MCNC: 151 MG/DL (ref 70–99)
GLUCOSE BLD-MCNC: 238 MG/DL (ref 70–99)
GLUCOSE BLD-MCNC: 241 MG/DL (ref 70–99)
GLUCOSE BLD-MCNC: 258 MG/DL (ref 70–99)
GLUCOSE BLD-MCNC: 96 MG/DL (ref 70–99)
HCT VFR BLD CALC: 31.2 % (ref 42–52)
HEMOGLOBIN: 9.5 GM/DL (ref 13.5–18)
Lab: ABNORMAL
MCH RBC QN AUTO: 29.4 PG (ref 27–31)
MCHC RBC AUTO-ENTMCNC: 30.4 % (ref 32–36)
MCV RBC AUTO: 96.6 FL (ref 78–100)
PDW BLD-RTO: 16.2 % (ref 11.7–14.9)
PLATELET # BLD: 392 K/CU MM (ref 140–440)
PMV BLD AUTO: 10.8 FL (ref 7.5–11.1)
RBC # BLD: 3.23 M/CU MM (ref 4.6–6.2)
SPECIMEN: ABNORMAL
WBC # BLD: 9.6 K/CU MM (ref 4–10.5)

## 2022-04-07 PROCEDURE — 6370000000 HC RX 637 (ALT 250 FOR IP): Performed by: PHYSICAL MEDICINE & REHABILITATION

## 2022-04-07 PROCEDURE — 97110 THERAPEUTIC EXERCISES: CPT

## 2022-04-07 PROCEDURE — 6370000000 HC RX 637 (ALT 250 FOR IP): Performed by: INTERNAL MEDICINE

## 2022-04-07 PROCEDURE — 97530 THERAPEUTIC ACTIVITIES: CPT

## 2022-04-07 PROCEDURE — 85027 COMPLETE CBC AUTOMATED: CPT

## 2022-04-07 PROCEDURE — 6360000002 HC RX W HCPCS: Performed by: INTERNAL MEDICINE

## 2022-04-07 PROCEDURE — 1280000000 HC REHAB R&B

## 2022-04-07 PROCEDURE — 36415 COLL VENOUS BLD VENIPUNCTURE: CPT

## 2022-04-07 PROCEDURE — 82962 GLUCOSE BLOOD TEST: CPT

## 2022-04-07 PROCEDURE — 97116 GAIT TRAINING THERAPY: CPT

## 2022-04-07 PROCEDURE — 2500000003 HC RX 250 WO HCPCS: Performed by: PHYSICAL MEDICINE & REHABILITATION

## 2022-04-07 PROCEDURE — 97535 SELF CARE MNGMENT TRAINING: CPT

## 2022-04-07 PROCEDURE — 2580000003 HC RX 258: Performed by: INTERNAL MEDICINE

## 2022-04-07 PROCEDURE — 94761 N-INVAS EAR/PLS OXIMETRY MLT: CPT

## 2022-04-07 RX ORDER — GLIPIZIDE 5 MG/1
7.5 TABLET ORAL
Status: DISCONTINUED | OUTPATIENT
Start: 2022-04-07 | End: 2022-04-09

## 2022-04-07 RX ADMIN — SODIUM BICARBONATE 650 MG: 650 TABLET ORAL at 21:02

## 2022-04-07 RX ADMIN — ACETAMINOPHEN 650 MG: 325 TABLET ORAL at 05:43

## 2022-04-07 RX ADMIN — DOXYCYCLINE HYCLATE 100 MG: 100 TABLET, FILM COATED ORAL at 21:02

## 2022-04-07 RX ADMIN — SODIUM BICARBONATE 650 MG: 650 TABLET ORAL at 13:13

## 2022-04-07 RX ADMIN — GLIPIZIDE 7.5 MG: 5 TABLET ORAL at 17:07

## 2022-04-07 RX ADMIN — CARVEDILOL 3.12 MG: 6.25 TABLET, FILM COATED ORAL at 17:06

## 2022-04-07 RX ADMIN — SODIUM CHLORIDE, PRESERVATIVE FREE 5 ML: 5 INJECTION INTRAVENOUS at 10:00

## 2022-04-07 RX ADMIN — ALLOPURINOL 100 MG: 100 TABLET ORAL at 09:33

## 2022-04-07 RX ADMIN — MICONAZOLE NITRATE: 2 POWDER TOPICAL at 09:32

## 2022-04-07 RX ADMIN — ASPIRIN 81 MG 81 MG: 81 TABLET ORAL at 09:33

## 2022-04-07 RX ADMIN — ACETAMINOPHEN 650 MG: 325 TABLET ORAL at 17:09

## 2022-04-07 RX ADMIN — CARVEDILOL 3.12 MG: 6.25 TABLET, FILM COATED ORAL at 09:33

## 2022-04-07 RX ADMIN — HEPARIN SODIUM 5000 UNITS: 5000 INJECTION INTRAVENOUS; SUBCUTANEOUS at 21:01

## 2022-04-07 RX ADMIN — SODIUM BICARBONATE 650 MG: 650 TABLET ORAL at 09:33

## 2022-04-07 RX ADMIN — SODIUM CHLORIDE, PRESERVATIVE FREE 10 ML: 5 INJECTION INTRAVENOUS at 21:01

## 2022-04-07 RX ADMIN — LEVOTHYROXINE SODIUM 50 MCG: 0.05 TABLET ORAL at 05:43

## 2022-04-07 RX ADMIN — GLIPIZIDE 7.5 MG: 5 TABLET ORAL at 09:33

## 2022-04-07 RX ADMIN — HEPARIN SODIUM 5000 UNITS: 5000 INJECTION INTRAVENOUS; SUBCUTANEOUS at 13:13

## 2022-04-07 RX ADMIN — HEPARIN SODIUM 5000 UNITS: 5000 INJECTION INTRAVENOUS; SUBCUTANEOUS at 05:43

## 2022-04-07 RX ADMIN — PREGABALIN 50 MG: 50 CAPSULE ORAL at 21:02

## 2022-04-07 RX ADMIN — MICONAZOLE NITRATE: 2 POWDER TOPICAL at 21:08

## 2022-04-07 RX ADMIN — DOXYCYCLINE HYCLATE 100 MG: 100 TABLET, FILM COATED ORAL at 09:34

## 2022-04-07 RX ADMIN — ATORVASTATIN CALCIUM 40 MG: 40 TABLET, FILM COATED ORAL at 21:02

## 2022-04-07 RX ADMIN — ALOGLIPTIN 6.25 MG: 6.25 TABLET, FILM COATED ORAL at 09:34

## 2022-04-07 RX ADMIN — PREGABALIN 50 MG: 50 CAPSULE ORAL at 09:34

## 2022-04-07 ASSESSMENT — PAIN DESCRIPTION - ORIENTATION: ORIENTATION: LEFT

## 2022-04-07 ASSESSMENT — PAIN SCALES - GENERAL
PAINLEVEL_OUTOF10: 7
PAINLEVEL_OUTOF10: 7
PAINLEVEL_OUTOF10: 6
PAINLEVEL_OUTOF10: 7

## 2022-04-07 ASSESSMENT — PAIN DESCRIPTION - LOCATION
LOCATION: ABDOMEN
LOCATION: HIP

## 2022-04-07 ASSESSMENT — PAIN DESCRIPTION - DESCRIPTORS: DESCRIPTORS: DISCOMFORT

## 2022-04-07 ASSESSMENT — PAIN - FUNCTIONAL ASSESSMENT: PAIN_FUNCTIONAL_ASSESSMENT: ACTIVITIES ARE NOT PREVENTED

## 2022-04-07 ASSESSMENT — PAIN DESCRIPTION - FREQUENCY: FREQUENCY: INTERMITTENT

## 2022-04-07 ASSESSMENT — PAIN DESCRIPTION - ONSET: ONSET: ON-GOING

## 2022-04-07 ASSESSMENT — PAIN DESCRIPTION - PROGRESSION: CLINICAL_PROGRESSION: NOT CHANGED

## 2022-04-07 ASSESSMENT — PAIN DESCRIPTION - PAIN TYPE: TYPE: ACUTE PAIN

## 2022-04-07 NOTE — PLAN OF CARE
Problem: Skin Integrity:  Goal: Will show no infection signs and symptoms  Description: Will show no infection signs and symptoms  4/7/2022 1005 by Dominik Mcpherson RN  Outcome: Ongoing  4/7/2022 0049 by Chip Mendoza LPN  Outcome: Ongoing  Goal: Absence of new skin breakdown  Description: Absence of new skin breakdown  4/7/2022 1005 by Dominik Mcpherson RN  Outcome: Ongoing  4/7/2022 0049 by Chip Mendoza LPN  Outcome: Ongoing     Problem: Infection:  Goal: Will remain free from infection  Description: Will remain free from infection  4/7/2022 1005 by Dominik Mcpherson RN  Outcome: Ongoing  4/7/2022 0049 by Chip Mendoza LPN  Outcome: Ongoing     Problem: Safety:  Goal: Free from accidental physical injury  Description: Free from accidental physical injury  4/7/2022 1005 by oDminik Mcpherson RN  Outcome: Ongoing  4/7/2022 0049 by Chip Mendoza LPN  Outcome: Ongoing  Goal: Free from intentional harm  Description: Free from intentional harm  4/7/2022 1005 by Dominik Mcpherson RN  Outcome: Ongoing  4/7/2022 0049 by Chip Mendoza LPN  Outcome: Ongoing     Problem: Daily Care:  Goal: Daily care needs are met  Description: Daily care needs are met  4/7/2022 1005 by Dominik Mcpherson RN  Outcome: Ongoing  4/7/2022 0049 by Chip Mendoza LPN  Outcome: Ongoing     Problem: Pain:  Goal: Patient's pain/discomfort is manageable  Description: Patient's pain/discomfort is manageable  4/7/2022 1005 by Dominik Mcpherson RN  Outcome: Ongoing  4/7/2022 0049 by Chip Mendoza LPN  Outcome: Ongoing  Goal: Pain level will decrease  Description: Pain level will decrease  4/7/2022 1005 by Dominik Mcpherson RN  Outcome: Ongoing  4/7/2022 0049 by Chip Mendoza LPN  Outcome: Ongoing  Goal: Control of acute pain  Description: Control of acute pain  4/7/2022 1005 by Dominik Mcpherson RN  Outcome: Ongoing  4/7/2022 0049 by Chip Mendoza LPN  Outcome: Ongoing  Goal: Control of chronic pain  Description: Control of chronic pain  4/7/2022 1005 by Dorota Gleason RN  Outcome: Ongoing  4/7/2022 0049 by Sharri Hargrove LPN  Outcome: Ongoing     Problem: Skin Integrity:  Goal: Skin integrity will stabilize  Description: Skin integrity will stabilize  4/7/2022 1005 by Dorota Gleason RN  Outcome: Ongoing  4/7/2022 0049 by Sharri Hargrove LPN  Outcome: Ongoing     Problem: Discharge Planning:  Goal: Patients continuum of care needs are met  Description: Patients continuum of care needs are met  4/7/2022 1005 by Dorota Gleason RN  Outcome: Ongoing  4/7/2022 0049 by Sharri Hargrove LPN  Outcome: Ongoing     Problem: Mobility - Impaired:  Goal: Mobility will improve  Description: Mobility will improve  4/7/2022 1005 by Dorota Gleason RN  Outcome: Ongoing  4/7/2022 0049 by Sharri Hargrove LPN  Outcome: Ongoing

## 2022-04-07 NOTE — PROGRESS NOTES
Occupational Therapy   Physical Rehabilitation: OCCUPATIONAL THERAPY     [x] daily progress note       [] discharge       Patient Name:  Jasmine Ponce   :  1940 MRN: [id number]  Room:  [address] [place] [address] Date of Admission: 3/30/2022  Rehabilitation Diagnosis:   Metabolic encephalopathy [C64.19]  Metabolic encephalopathy [E25.15]       Date 2022       Day of ARU Week:  2   Time IN/OUT 1055/1200   Individual Tx Minutes 65   Group Tx Minutes    Co-Treat Minutes    Concurrent Tx Minutes    TOTAL Tx Time Mins 65   Variance Time +5   Variance Time []   Refusal due to:     []   Medical hold/reason:    []   Illness   []   Off Unit for test/procedure  [x]   Extra time needed to complete task  []   Therapeutic need  []   Other (specify):   Restrictions Restrictions/Precautions: General Precautions,Fall Risk,Contact Precautions (suprapubic catheter)         Communication with other providers: [x]   OK to see per nursing:     [x]   Spoke with team member regarding:      Subjective observations and cognitive status: Patient stated \"call me grandpa\". Patient was confused for most of the session and was difficult to wake at start of session. Patient said \" get me uncle Carito Dsouza! You know who uncle David is\" . Pain level/location:    Patient denied /10       Location:    Discharge recommendations  Anticipated discharge date:   Destination: []home alone   []home alone w assist prn   [] home w/ family    [x] Continuous supervision       []SNF    [] Assisted living     [] Other:   Continued therapy: []HHC OT  []OUTPATIENT  OT   [] No Further OT  Equipment needs: none      Toileting:   Maximal assist for posterior hygiene; Patient was able to assist with clothing management       Toilet Transfers:   Maximal assist completed toilet transfer 2x.    Device Used:    [x]   Standard Toilet         []   Figueroa Levers           []  Bedside Commode       []   Elevated Toilet          []   Other:        Bed Mobility: []   Pt received out of bed   Rolling R/L:  Completed rolling L to R 2x each; maximal verbal cues and tactile cues; Mod A for rolling   Supine --> Sit:  Min A for LE management   Sit --> Supine: Mod a; assist for LE management and at trunk     Transfers:    Sit--> Stand:  Min A 1x and Mod A 2x   Stand --> Sit:   Min A for control descent  Other:    Assistive device required for transfer:   FWW to W/C       Homemaking Tasks:   N/a       Additional Therapeutic activities/exercises completed this date:     [x]   ADL Training   [x]   Balance/Postural training     [x]   Bed/Transfer Training   []   Endurance Training   []   Neuromuscular Re-ed   []   Nu-step:  Time:        Level:         #Steps:       []   Rebounder:    []  Seated     []  Standing        []   Supine Ther Ex (reps/sets):     []   Seated Ther Ex (reps/sets):     []   Standing Ther Ex (reps/sets):     []   Other:      Comments:   Patient completed bed mobility requiring maximal verbal cues for initiation and minimal to moderate assist. Patient sat EOB for 12 minutes with bilateral UE support. Patient completed a stand -step transfer from Bed> W/c MOD A. Maximal verbal cues for transfer technique and for initiation. Patient completed completed 2x toilet transfers during session. Patient was able to assist with clothing management, however, he required total assist for posterior hygiene. Patient completed step-transfer to bed with Min A x2. Patient completed required moderate assist for sitting to supine; management for LEs and assist for hip adjustment. Patient was confused and agitated throughout session.      Patient/Caregiver Education and Training:   []   [employer] Equipment Use  [x]   Bed Mobility/Transfer Technique/Safety  []   Energy Conservation Tips  []   Family training  [x]   Postural Awareness  [x]   Safety During Functional Activities  []   Reinforced Patient's Precautions   []   Progress was updated and reviewed in Rehabtracker with patient and/or family this         date. Treatment Plan for Next Session: continue POC    Assessment: This pt demonstrated a negative response to today's treatment as evidenced by patient was confused and agitated throughout session. Treatment/Activity Tolerance:   [] Tolerated treatment with no adverse effects    [] Patient limited by fatigue  [] Patient limited by pain   [] Patient limited by medical complications:    [] Adverse reaction to Tx:   [x] Significant change in status    Safety:       [x]  bed alarm set    []  chair alarm set    []  Pt refused alarms                [x]  Telesitter activated      [x]  Gait belt used during tx session      []other:       Number of Minutes/Billable Intervention  Therapeutic Exercise    ADL Self-care 65   Neuro Re-Ed    Therapeutic Activity    Group    Other:    TOTAL 65       Social History  Social/Functional History  Lives With: Son  Type of Home: House  Home Layout: One level  Home Access: Ramped entrance  Bathroom Shower/Tub: Tub/Shower unit,Shower chair with back  Bathroom Toilet: Standard  Bathroom Equipment: Grab bars in shower,Shower chair  Bathroom Accessibility: [place] accessible (states once he is in bathroom he uses countertops and grab bars, but pt not able to clarify if this is because there is little room or just preference)  Home Equipment: Cane,4 wheeled walker,Rolling walker (pt thinks there may be a BSC from his wife in storage, but unsure)  ADL Assistance: Independent  Homemaking Assistance: Independent  Homemaking Responsibilities: Yes  Meal Prep Responsibility: Secondary  Laundry Responsibility: Primary  Cleaning Responsibility: Secondary  Bill Paying/Finance Responsibility: Primary  Shopping Responsibility: Secondary  Health Care Management: Primary (has pill box with alarms per pt. until recently [address] would set up, but once [facility] stopped he managed on own)  Ambulation Assistance: Independent (mod I with 4ww for up to 150'.  pt used electronic carts at stores when able)  Transfer Assistance: Independent  Active : No  Patient's  Info: 3 sons assist in driving. Mode of Transportation: Car,SUV  Occupation: Retired  Type of occupation: Retired from [address] [place] [address] Street: TV, very little reading, no pets, doctors, son manages groceries,  Meds are managed via pill dispenser that son manages. Pt occasionally writes checks and some auto pay  Additional Comments: pt sleeps in flat bed. Pt has fallen >4 times in past year. Pt attributes many to low BS. No injury    Objective                                                                                    Goals:  (Update in navigator)  Short term goals  Time Frame for Short term goals: STGs=LTGs:  Long term goals  Time Frame for Long term goals : 10-14 days or until d/c  Long term goal 1: Pt will complete grooming tasks Ind seated  Long term goal 2: Pt will complete total body bathing c S  Long term goal 3: Pt will complete UB dressing c setup  Long term goal 4: Pt will complete LB dressing c supervision using AE PRN  Long term goal 5: Pt will doff/don footwear c supervision using AE PRN  Long term goals 6: Pt will complete toileting c S  Long term goal 7: Pt will complete functional transfers (bed, chair, toilet, shower) c DME PRN and S  Long term goal 8: Pt will perform therex/therax to facilitate increased strength/endurance/ax tolerance (c emphasis on dynamic standing balance >8 mins, BUE endurance, cognitive retraining) c SBA:        Plan of Care                                                                              Times per week: 5 days per week for a minimum of 60 minutes/day plus group as appropriate for 60 minutes.   Treatment to include Plan  Times per day: Daily  Current Treatment Recommendations: [place] [facility] Education & Training,Patient/Caregiver Education & Training,Equipment Evaluation, Education, & procurement,Self-Care / ADL,Home Management Training,Cognitive/Perceptual Training    Electronically signed by   Cathy Granados OT,  4/7/2022, 11:00 AM

## 2022-04-07 NOTE — PROGRESS NOTES
Physical Therapy  . [x] daily progress note       [] discharge       Patient Name:  Fred Epps   :  1940 MRN: [id number]  Room:  [address] [place], [place] [address] Date of Admission: 3/30/2022  Rehabilitation Diagnosis:   Metabolic encephalopathy [Y25.12]  Metabolic encephalopathy [X38.11]       Date 2022       Day of ARU Week:  2   Time IN/OUT 1430/1530   Individual Tx Minutes 60   Group Tx Minutes    Co-Treat Minutes    Concurrent Tx Minutes    TOTAL Tx Time Mins 60   Variance Time    Variance Time []   Refusal due to:     []   Medical hold/reason:    []   Illness   []   Off Unit for test/procedure  []   Extra time needed to complete task  []   Therapeutic need  []   Other (specify):   Restrictions Restrictions/Precautions  Restrictions/Precautions: General Precautions,Fall Risk,Contact Precautions (suprapubic catheter)      Interdisciplinary communication [x]   Cleared for therapy per nursing     []   RN notified about issues during session  []   RN updated on pt performance  []   Spoke with   []   Spoke with OT  [x]   Spoke with MD: informed of poor quality of last several sessions due to lethargy. Physician reported urinalysis did not show signs of acute problem. []   Other:    Subjective observations and cognitive status: Pt sitting in w/c, asleep. Initially pt roused with min effort, but as session went on, pt very difficult to rouse and any verbal responses were not understandable. About 40 minutes into session pt then became slightly more alert and agreed to gait, then needed to try to toilet(unsuccessful). Pt at end of session was returned to bed where he stayed awake briefly, then returned to sleep. Pt states he just feels tired \"like somebody gassed me\"     Pain level/location:   0 /10       Location:    Discharge recommendations  Anticipated discharge date:    Destination: []home alone   []home alone with assist PRN     [x] home w/ family ?      [] Continuous supervision  []SNF    [] Assisted living     [] Other:  Continued therapy: []HHC PT  []OUTPATIENT  PT   [] No Further PT  []SNF PT  Caregiver training recommended: []Yes  [] No   Equipment needs: 2ww, w/c? Bed Mobility:           []   Pt received out of bed   Sit --> lying:   Mod assist BLE  Bed features used: [x] Yes  [] No       Transfers:    Sit--> Stand:  Varied Cg to min assist  Stand --> Sit:  CG  Toilet Transfer (if applicable): CG from regular height  Other:        Gait:    Distance: 20', 12'x2   Assistance:  Min assist  Device: 2ww  Gait Quality:  Shuffling, leaning to R    Wheelchair Propulsion:  Distance:  Unable due to lethargy        Additional Therapeutic activities/exercises completed this date:     []   Nu-step:  Time:        Level:         #Steps:       []   Rebounder:    []  Seated     []  Standing        []   Balance training         [x]   Postural training : stood in // bars x 1 min with difficulty coming to full stand initially with leaning to L initially, then to R   []   Supine ther ex (reps/sets):     [x]   Seated ther ex (reps/sets):  LAQ, marching, AP with mod assist for full ROM, eyes closed throughout   []   Standing ther ex (reps/sets):     []   Picking up object from floor (standing):                   []   Reacher used   []   Other:   []   Other:    Comments:      Patient/Caregiver Education and Training:   []   Role of PT  [x]   Education about Dx  []   Use of call light for assist   []   Wheelchair mobility/management  []   HEP provided and explained   []   Treatment plan reviewed  []   Home safety  []   Body mechanics  []   Positioning  [x]   Bed Mobility/Transfer technique  [x]   Gait technique/sequencing  []   Proper use of assistive device/adaptive equipment  []   Stair training/Advanced mobility safety and technique  []   Reinforced patient's precautions/mobility while maintaining precautions  []   Postural awareness  []   Family/caregiver training  []   Progress was updated and reviewed in Rehabtracker with patient and/or family this date. []   Other:      Treatment Plan for Next Session: progression of gait, balance      Assessment:   Assessment: This pt demonstrated a neutral response to today's treatment as evidenced by difficulty attending to tasks. The patient is making very little progress toward established goals as evidenced by QI scores. Ongoing deficits are observed in the areas of level of alertness and continued focus on ability to participate well during full session is recommended.       Treatment/Activity Tolerance:   [] Tolerated treatment with no adverse effects    [x] Patient limited by fatigue  [] Patient limited by pain   [] Patient limited by medical complications:    [] Adverse reaction to Tx:   [] Significant change in status    Barrier/s to progress/learning:   []   None  [x]   Cognition  []   Hearing deficit  []   Pre-morbid mental/psychological status   []   Motivation  []   Communication  []   Anxiety  []   Vision deficit  [x]   Attention  []   Other:      Safety:       [x]  bed alarm set    []  chair alarm set    []  Pt refused alarms                []  Telesitter activated      [x]  Gait belt used during tx session      []other:         Number of Minutes/Billable Intervention  Gait Training 15   Therapeutic Exercise    Neuro Re-Ed    Therapeutic Activity 45   Wheelchair Propulsion    Group    Other:    TOTAL 60         Social History  Social/Functional History  Lives With: Son  Type of Home: House  Home Layout: One level  Home Access: Ramped entrance  Bathroom Shower/Tub: Tub/Shower unit,Shower chair with back  Bathroom Toilet: Standard  Bathroom Equipment: Grab bars in shower,Shower chair  Bathroom Accessibility: [place] accessible (states once he is in bathroom he uses countertops and grab bars, but pt not able to clarify if this is because there is little room or just preference)  Home Equipment: [address] walker (pt thinks there may be a BSC from his wife in storage, but unsure)  ADL Assistance: Independent  Homemaking Assistance: Independent  Homemaking Responsibilities: Yes  Meal Prep Responsibility: Secondary  Laundry Responsibility: Primary  Cleaning Responsibility: Secondary  Bill Paying/Finance Responsibility: Primary  Shopping Responsibility: Secondary  Health Care Management: Primary (has pill box with alarms per pt. until recently [facility] nursing would set up, but once [facility] stopped he managed on own)  Ambulation Assistance: Independent (mod I with 4ww for up to 150'. pt used electronic carts at stores when able)  Transfer Assistance: Independent  Active : No  Patient's  Info: 3 sons assist in driving. Mode of Transportation: Car,SUV  Occupation: Retired  Type of occupation: Retired from [address] [place] [address] Street: TV, very little reading, no pets, doctors, son manages groceries,  Meds are managed via pill dispenser that son manages. Pt occasionally writes checks and some auto pay  Additional Comments: pt sleeps in flat bed. Pt has fallen >4 times in past year. Pt attributes many to low BS. No injury    Objective                                                                                    Goals:  (Update in navigator)   :   Long term goals  Time Frame for Long term goals : 12-14 days  Long term goal 1: Pt will perform bed mobility with mod I  Long term goal 2: Pt will perform sit to stand and pivot transfers with mod I, car transfers with CGA  Long term goal 3: Pt will ambulate 10' with 2ww with mod I, up to 150' with supervision including uneven surfaces  Long term goal 4: Pt will ascend/descend curb step with 2ww and up to 4 steps with B rails with supervision  Long term goal 5: Pt will  object from floor with 2ww and reacher with supervision:        Plan of Care                                                                              Times per week: 5 days per week for a minimum of 60 minutes/day plus group as appropriate for 60 minutes.   Treatment to include Current Treatment Recommendations: Strengthening,ROM,Balance Training,Functional Mobility Training,Transfer Training,ADL/Self-care Training,Gait Training,Patient/Caregiver Education & Training,IADL Training,Stair training,Equipment Evaluation, Education, & procurement,Neuromuscular Re-education,Pain Management,Home Exercise [employer] Training,Safety Education & Training,Cognitive/Perceptual Training,Cognitive Reorientation    Electronically signed by   Ki Jonhston PT,  4/7/2022, 3:21 PM

## 2022-04-07 NOTE — PROGRESS NOTES
Progress Note( Dr. Yancy Miranda)  4/6/2022  Subjective:   Admit Date: 3/30/2022  PCP: Donald Mcelroy MD    Admitted For : Patient was initially admitted to medical floor for sepsis from UTI and generalized  weakness    Consulted For: Better control of blood glucose    Interval History: Patient was transferred from medical floor on 3/30/2022 and was admitted to acute rehab unit for rehabilitation and physical therapy  Blood sugar control running a bit higher blood glucose        Denies any chest pains,   Denies SOB . Denies nausea or vomiting. No new bowel or bladder symptoms.        Intake/Output Summary (Last 24 hours) at 4/6/2022 2336  Last data filed at 4/6/2022 1536  Gross per 24 hour   Intake 387 ml   Output 1450 ml   Net -1063 ml       DATA    CBC:   Recent Labs     04/04/22  1300   WBC 7.8   HGB 8.7*       CMP:  Recent Labs     04/04/22  1300 04/06/22  0712    137   K 5.0 4.9    102   CO2 19* 19*   BUN 54* 64*   CREATININE 2.2* 2.5*   CALCIUM 9.6 9.2   PROT 6.2*  --    LABALBU 3.5 3.3*   BILITOT 0.2  --    ALKPHOS 108  --    AST 14*  --    ALT 12  --      Lipids: No results found for: CHOL, HDL, TRIG  Glucose:  Recent Labs     04/06/22  1553 04/06/22  1714 04/06/22 2027   POCGLU 342* 277* 152*     [id number]:  Lab Results   Component Value Date    LABA1C 9.2 03/22/2022     High Sensitivity TSH:   Lab Results   Component Value Date    TSHHS 6.190 03/25/2022     Free T3: No results found for: FT3  Free T4:  Lab Results   Component Value Date    T4FREE 1.15 03/27/2022       No orders to display        Scheduled Medicines   Medications:    insulin lispro  0-6 Units SubCUTAneous Nightly    sodium bicarbonate  650 mg Oral TID    glipiZIDE  5 mg Oral BID AC    insulin lispro  0-18 Units SubCUTAneous TID WC    miconazole   Topical BID    [Held by provider] insulin glargine  10 Units SubCUTAneous Nightly    heparin (porcine)  5,000 Units SubCUTAneous 3 times per day    sodium chloride flush  5-40 mL IntraVENous 2 times per day    allopurinol  100 mg Oral Daily    alogliptin  6.25 mg Oral Daily    aspirin  81 mg Oral Daily    atorvastatin  40 mg Oral Nightly    carvedilol  3.125 mg Oral BID WC    levothyroxine  50 mcg Oral Daily    pregabalin  50 mg Oral BID    doxycycline hyclate  100 mg Oral 2 times per day      Infusions:    dextrose           Objective:   Vitals: BP (!) 154/88   Pulse 81   Temp 97.3 °F (36.3 °C) (Oral)   Resp 17   Ht 5' 8\" (1.727 m)   Wt 197 lb 15.6 oz (89.8 kg)   SpO2 100%   BMI 30.10 kg/m²   General appearance: alert and cooperative with exam  Neck: no JVD or bruit  Thyroid : Normal lobes   Lungs: Has Vesicular Breath sounds   Heart:  regular rate and rhythm  Abdomen: soft, non-tender; bowel sounds normal; no masses,  no organomegaly has Surapubic Catheter   Musculoskeletal: Normal  Extremities: extremities normal, , no edema  Neurologic:  Awake, alert, oriented to name, place and time. Cranial nerves II-XII are grossly intact. Motor weakness . Sensory is intact. ,  and gait is abnormal.unstable     Assessment:     Patient Active Problem List:     Gait disturbance     Generalized weakness     Diabetes mellitus (HCC)     Osteoarthritis     Anemia of chronic disorder     Infected implanted bladder sphincter cuff     Escherichia coli urinary tract infection     Hypertension     Sepsis ([address]     Type 2 diabetes mellitus with hypoglycemia without coma ([address]     UTI (urinary tract infection) due to urinary indwelling catheter (HCC)     Hyperkalemia     Acute kidney failure (HCC)     Leg weakness, bilateral     Type 2 diabetes mellitus with neurological manifestations, uncontrolled ([address]     Complicated UTI (urinary tract infection)     Essential hypertension     Severe muscle deconditioning     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 (HCC)     Frequent falls     Chronic kidney disease, stage 3a ([address]     Uncontrolled type 2 diabetes mellitus with peripheral neuropathy [facility]     Prostate cancer ([facility]     Suprapubic catheter ([facility]     Sepsis secondary to UTI ([facility]      Plan:     1. Reviewed POC blood glucose . Labs and X ray results   2. Reviewed Current Medicines   3. On meal/ Correction bolus Humalog/ Basal Lantus Insulin. On hold for now regime / and   4. Monitor Blood glucose frequently   5. Modified  the dose of Insulin/ other medicines as needed  6. On low-dose Synthroid of 50 mg/day  7. Patient was transferred to acute rehab unit on 3/30//2022   8. Patient to be participating in physical therapy and Occupational Therapy of acute rehab unit  9. Will follow     .      Radha Villavicencio MD, MD

## 2022-04-07 NOTE — PROGRESS NOTES
Occupational Therapy  Physical Rehabilitation: OCCUPATIONAL THERAPY     [x] daily progress note       [] discharge       Patient Name:  Kavon Brand   :  1940 MRN: [id number]  Room:  [address] [place] [address] Date of Admission: 3/30/2022  Rehabilitation Diagnosis:   Metabolic encephalopathy [K14.84]  Metabolic encephalopathy [F42.26]       Date 2022       Day of ARU Week:  2   Time IN/OUT 6016-7409   Individual Tx Minutes 60   Group Tx Minutes    Co-Treat Minutes    Concurrent Tx Minutes    TOTAL Tx Time Mins 60   Variance Time    Variance Time []   Refusal due to:     []   Medical hold/reason:    []   Illness   []   Off Unit for test/procedure  []   Extra time needed to complete task  []   Therapeutic need  []   Other (specify):   Restrictions Restrictions/Precautions: General Precautions,Fall Risk,Contact Precautions (suprapubic catheter)         Communication with other providers: [x]   OK to see per nursing:     []   Spoke with team member regarding:      Subjective observations and cognitive status: Pt resting in bed on approach; more alert this session. Pt agreeable to therapy. Pain level/location:    /10       Location: none    Discharge recommendations  Anticipated discharge date:   Destination: []?home alone   []?home alone w assist prn   []? home w/ family    [x]? Continuous supervision       []? SNF    []? Assisted living     []? Other:   Continued therapy: []?[facility] OT  []? OUTPATIENT  OT   []?  No Further OT  Equipment needs: none      Toileting:   Declined need        Toilet Transfers:   NA   Device Used:    []   Standard Toilet         []   Grab Bars           []  Bedside Commode       []   Elevated Toilet          []   Other:        Bed Mobility:           []   Pt received out of bed   Supine --> Sit:  Min A at trunk       Transfers:    Sit--> Stand:  CGA  Stand --> Sit:   CGA, cues for hand placement and controlled descent  Stand-Pivot:   CGA, cues for body alignment and full turn   Other: Assistive device required for transfer:   RW       Functional Mobility: To increase BUE strength and endurance for functional transfers and ADLs, Pt self propelled WC 27 ft   Assistance:  SBA c cues for   Device:   []   Rolling Walker     []   Standard Walker []   Wheelchair        []   [place]       []   4-Wheeled Walker         []   Cardiac Walker       []   Other:            Additional Therapeutic activities/exercises completed this date:     []   ADL Training   [x]   Balance/Postural training: Pt stood x 1.5 min x 2 at counter with CGA while completing dynamic stand task while reaching outside base of support to facilitate increased balance for ADL tasks and transfers. Pt had 1 UE on counter for support and required mod verbal cues for upright posture. [x]   Bed/Transfer Training   []   Endurance Training   []   Neuromuscular Re-ed   []   Nu-step:  Time:        Level:         #Steps:       []   Rebounder:    []  Seated     []  Standing        []   Supine Ther Ex (reps/sets):     [x]   Seated Ther Ex (reps/sets): To increase UB strength for functional transfers and ADLs, Pt completed 4 sets of 10 reps c 25# on rickshaw.    []   Standing Ther Ex (reps/sets):     []   Other:      Comments:      Patient/Caregiver Education and Training:   []   [employer] Equipment Use  []   Bed Mobility/Transfer Technique/Safety  []   Energy Conservation Tips  []   Family training  []   Postural Awareness  []   Safety During Functional Activities  []   Reinforced Patient's Precautions   []   Progress was updated and reviewed in Rehabtracker with patient and/or family this         date.     Treatment Plan for Next Session: Continue OT POC         Treatment/Activity Tolerance:   [x] Tolerated treatment with no adverse effects    [] Patient limited by fatigue  [] Patient limited by pain   [] Patient limited by medical complications:    [] Adverse reaction to Tx:   [] Significant change in status    Safety:       []  bed alarm set [x]  chair alarm set    []  Pt refused alarms                []  Telesitter activated      [x]  Gait belt used during tx session      []other:       Number of Minutes/Billable Intervention  Therapeutic Exercise 15   ADL Self-care    Neuro Re-Ed    Therapeutic Activity 45   Group    Other:    TOTAL 60       Social History  Social/Functional History  Lives With: Son  Type of Home: House  Home Layout: One level  Home Access: Ramped entrance  Bathroom Shower/Tub: Tub/Shower unit,Shower chair with back  Bathroom Toilet: Standard  Bathroom Equipment: Grab bars in shower,Shower chair  Bathroom Accessibility: Leilani Guardadoza accessible (states once he is in bathroom he uses countertops and grab bars, but pt not able to clarify if this is because there is little room or just preference)  Home Equipment: [address] walker (pt thinks there may be a BSC from his wife in storage, but unsure)  ADL Assistance: Independent  Homemaking Assistance: Independent  Homemaking Responsibilities: Yes  Meal Prep Responsibility: Secondary  Laundry Responsibility: Primary  Cleaning Responsibility: Secondary  Bill Paying/Finance Responsibility: Primary  Shopping Responsibility: Secondary  Health Care Management: Primary (has pill box with alarms per pt. until recently [address] would set up, but once [address] stopped he managed on own)  Ambulation Assistance: Independent (mod I with 4ww for up to 150'. pt used electronic carts at stores when able)  Transfer Assistance: Independent  Active : No  Patient's  Info: 3 sons assist in driving. Mode of Transportation: Car,SUV  Occupation: Retired  Type of occupation: Retired from [address] [place] [address] Street: TV, very little reading, no pets, doctors, son manages groceries,  Meds are managed via pill dispenser that son manages. Pt occasionally writes checks and some auto pay  Additional Comments: pt sleeps in flat bed. Pt has fallen >4 times in past year.  Pt attributes many to low BS. No injury    Objective                                                                                    Goals:  (Update in navigator)  Short term goals  Time Frame for Short term goals: STGs=LTGs:  Long term goals  Time Frame for Long term goals : 10-14 days or until d/c  Long term goal 1: Pt will complete grooming tasks Ind seated  Long term goal 2: Pt will complete total body bathing c S  Long term goal 3: Pt will complete UB dressing c setup  Long term goal 4: Pt will complete LB dressing c supervision using AE PRN  Long term goal 5: Pt will doff/don footwear c supervision using AE PRN  Long term goals 6: Pt will complete toileting c S  Long term goal 7: Pt will complete functional transfers (bed, chair, toilet, shower) c DME PRN and S  Long term goal 8: Pt will perform therex/therax to facilitate increased strength/endurance/ax tolerance (c emphasis on dynamic standing balance >8 mins, BUE endurance, cognitive retraining) c SBA:        Plan of Care                                                                              Times per week: 5 days per week for a minimum of 60 minutes/day plus group as appropriate for 60 minutes.   Treatment to include Plan  Times per day: Daily  Current Treatment Recommendations: Deanne Lua Mobility Training,Endurance [employer] Management,Safety Education & Training,Patient/Caregiver Education & Training,Equipment Evaluation, Education, & procurement,Self-Care / ADL,Home Management Training,Cognitive/Perceptual Training    Electronically signed by   KHARI Peck,  4/7/2022, 1:39 PM

## 2022-04-07 NOTE — PROGRESS NOTES
Nephrology Progress Note  4/7/2022 12:26 PM  Subjective: Interval History: Anand Link is a 78 y.o. male More awake today but still tired    Data:   Scheduled Meds:   glipiZIDE  7.5 mg Oral BID AC    insulin lispro  0-9 Units SubCUTAneous Nightly    sodium bicarbonate  650 mg Oral TID    insulin lispro  0-18 Units SubCUTAneous TID WC    miconazole   Topical BID    [Held by provider] insulin glargine  10 Units SubCUTAneous Nightly    heparin (porcine)  5,000 Units SubCUTAneous 3 times per day    sodium chloride flush  5-40 mL IntraVENous 2 times per day    allopurinol  100 mg Oral Daily    alogliptin  6.25 mg Oral Daily    aspirin  81 mg Oral Daily    atorvastatin  40 mg Oral Nightly    carvedilol  3.125 mg Oral BID WC    levothyroxine  50 mcg Oral Daily    pregabalin  50 mg Oral BID    doxycycline hyclate  100 mg Oral 2 times per day     Continuous Infusions:   dextrose           CBC   Recent Labs     04/04/22  1300 04/07/22  0615   WBC 7.8 9.6   HGB 8.7* 9.5*   HCT 30.8* 31.2*    392      BMP   Recent Labs     04/04/22  1300 04/06/22  0712    137   K 5.0 4.9    102   CO2 19* 19*   PHOS 5.2* 5.4*   BUN 54* 64*   CREATININE 2.2* 2.5*     Hepatic:   Recent Labs     04/04/22  1300   AST 14*   ALT 12   BILITOT 0.2   ALKPHOS 108     Troponin: No results for input(s): TROPONINI in the last 72 hours. BNP: No results for input(s): BNP in the last 72 hours. Lipids: No results for input(s): CHOL, HDL in the last 72 hours. Invalid input(s): LDLCALCU  ABGs: No results found for: PHART, PO2ART, WMC9RRL  INR: No results for input(s): INR in the last 72 hours.   Renal Labs  Albumin:    Lab Results   Component Value Date    LABALBU 3.3 04/06/2022     Calcium:    Lab Results   Component Value Date    CALCIUM 9.2 04/06/2022     Phosphorus:    Lab Results   Component Value Date    PHOS 5.4 04/06/2022     U/A:    Lab Results   Component Value Date    NITRU NEGATIVE 04/06/2022    NITRU NEGATIVE 03/23/2013    COLORU COLORLESS 04/06/2022    WBCUA 544 04/06/2022    RBCUA 64 04/06/2022    MUCUS RARE 04/06/2022    TRICHOMONAS NONE SEEN 04/06/2022    YEAST FEW 04/06/2022    BACTERIA NEGATIVE 04/06/2022    CLARITYU CLOUDY 04/06/2022    SPECGRAV 1.015 04/06/2022    UROBILINOGEN NORMAL 04/06/2022    BILIRUBINUR NEGATIVE 04/06/2022    BLOODU LARGE 04/06/2022    GLUCOSEU 1,000 03/23/2013    KETUA NEGATIVE 04/06/2022    AMORPHOUS RARE 11/16/2021     ABG:  No results found for: PHART, EGH6WPM, PO2ART, IHQ7EKS, BEART, THGBART, FOC2IJM, O0LEMDQO  HgBA1c:    Lab Results   Component Value Date    LABA1C 9.2 03/22/2022     Microalbumen/Creatinine ratio:  No components found for: RUCREAT  TSH:  No results found for: TSH  IRON:    Lab Results   Component Value Date    IRON 15 03/22/2022     Iron Saturation:  No components found for: PERCENTFE  TIBC:    Lab Results   Component Value Date    TIBC 214 03/22/2022     FERRITIN:    Lab Results   Component Value Date    FERRITIN 352 03/22/2022     RPR:  No results found for: RPR  MITALI:  No results found for: ANATITER, MITALI  24 Hour Urine for Creatinine Clearance:  No components found for: CREAT4, UHRS10, UTV10      Objective:   I/O: 04/06 0701 - 04/07 0700  In: 387 [P.O.:387]  Out: 1600 [Urine:1600]  I/O last 3 completed shifts: In: 507 [P.O.:507]  Out: 2750 [Urine:2750]  I/O this shift:  In: 120 [P.O.:120]  Out: -   Vitals: BP (!) 139/95   Pulse 85   Temp 98.2 °F (36.8 °C) (Oral)   Resp 18   Ht 5' 8\" (1.727 m)   Wt 188 lb 4.4 oz (85.4 kg)   SpO2 100%   BMI 28.63 kg/m²  {  General appearance: awake weak  HEENT: Head: Normal, normocephalic, atraumatic. Neck: supple, symmetrical, trachea midline  Lungs: diminished breath sounds bilaterally  Heart: S1, S2 normal  Abdomen: abnormal findings:  soft nt  Extremities: edema trace  Neurologic: Mental status: alertness: Weak tired        Assessment and Plan:      IMP:    1.   Acute renal failure from ATN on CKD 3/4   #2 hypertension   #3 type 2 diabetes   #4 hyperkalemia    Plan      #1 creatinine 2.5 monitor for now   #2 blood pressure slightly increased but stable   #3 monitor glucose control   #4 potassium stable   maintain supportive care in the setting of rehab         Nayana Carranza MD, MD

## 2022-04-08 LAB
GLUCOSE BLD-MCNC: 123 MG/DL (ref 70–99)
GLUCOSE BLD-MCNC: 127 MG/DL (ref 70–99)
GLUCOSE BLD-MCNC: 192 MG/DL (ref 70–99)
GLUCOSE BLD-MCNC: 195 MG/DL (ref 70–99)
GLUCOSE BLD-MCNC: 326 MG/DL (ref 70–99)

## 2022-04-08 PROCEDURE — 97530 THERAPEUTIC ACTIVITIES: CPT

## 2022-04-08 PROCEDURE — 82962 GLUCOSE BLOOD TEST: CPT

## 2022-04-08 PROCEDURE — 6370000000 HC RX 637 (ALT 250 FOR IP): Performed by: INTERNAL MEDICINE

## 2022-04-08 PROCEDURE — 2500000003 HC RX 250 WO HCPCS: Performed by: PHYSICAL MEDICINE & REHABILITATION

## 2022-04-08 PROCEDURE — 99211 OFF/OP EST MAY X REQ PHY/QHP: CPT

## 2022-04-08 PROCEDURE — 97535 SELF CARE MNGMENT TRAINING: CPT

## 2022-04-08 PROCEDURE — 97116 GAIT TRAINING THERAPY: CPT

## 2022-04-08 PROCEDURE — 97110 THERAPEUTIC EXERCISES: CPT

## 2022-04-08 PROCEDURE — 2580000003 HC RX 258: Performed by: INTERNAL MEDICINE

## 2022-04-08 PROCEDURE — 6370000000 HC RX 637 (ALT 250 FOR IP): Performed by: PHYSICAL MEDICINE & REHABILITATION

## 2022-04-08 PROCEDURE — 6360000002 HC RX W HCPCS: Performed by: INTERNAL MEDICINE

## 2022-04-08 PROCEDURE — 94761 N-INVAS EAR/PLS OXIMETRY MLT: CPT

## 2022-04-08 PROCEDURE — 1280000000 HC REHAB R&B

## 2022-04-08 RX ORDER — FLUCONAZOLE 100 MG/1
100 TABLET ORAL DAILY
Status: COMPLETED | OUTPATIENT
Start: 2022-04-08 | End: 2022-04-12

## 2022-04-08 RX ADMIN — SODIUM CHLORIDE, PRESERVATIVE FREE 10 ML: 5 INJECTION INTRAVENOUS at 20:08

## 2022-04-08 RX ADMIN — PREGABALIN 50 MG: 50 CAPSULE ORAL at 20:07

## 2022-04-08 RX ADMIN — DOXYCYCLINE HYCLATE 100 MG: 100 TABLET, FILM COATED ORAL at 20:07

## 2022-04-08 RX ADMIN — ASPIRIN 81 MG 81 MG: 81 TABLET ORAL at 08:24

## 2022-04-08 RX ADMIN — SODIUM BICARBONATE 650 MG: 650 TABLET ORAL at 08:24

## 2022-04-08 RX ADMIN — FLUCONAZOLE 100 MG: 100 TABLET ORAL at 17:21

## 2022-04-08 RX ADMIN — HEPARIN SODIUM 5000 UNITS: 5000 INJECTION INTRAVENOUS; SUBCUTANEOUS at 12:41

## 2022-04-08 RX ADMIN — GLIPIZIDE 7.5 MG: 5 TABLET ORAL at 17:21

## 2022-04-08 RX ADMIN — ATORVASTATIN CALCIUM 40 MG: 40 TABLET, FILM COATED ORAL at 20:07

## 2022-04-08 RX ADMIN — SODIUM BICARBONATE 650 MG: 650 TABLET ORAL at 20:07

## 2022-04-08 RX ADMIN — SODIUM BICARBONATE 650 MG: 650 TABLET ORAL at 12:41

## 2022-04-08 RX ADMIN — SODIUM CHLORIDE, PRESERVATIVE FREE 10 ML: 5 INJECTION INTRAVENOUS at 08:25

## 2022-04-08 RX ADMIN — ALLOPURINOL 100 MG: 100 TABLET ORAL at 08:24

## 2022-04-08 RX ADMIN — CARVEDILOL 3.12 MG: 6.25 TABLET, FILM COATED ORAL at 17:21

## 2022-04-08 RX ADMIN — GLIPIZIDE 7.5 MG: 5 TABLET ORAL at 08:23

## 2022-04-08 RX ADMIN — MICONAZOLE NITRATE: 2 POWDER TOPICAL at 20:08

## 2022-04-08 RX ADMIN — PREGABALIN 50 MG: 50 CAPSULE ORAL at 08:24

## 2022-04-08 RX ADMIN — LEVOTHYROXINE SODIUM 50 MCG: 0.05 TABLET ORAL at 06:30

## 2022-04-08 RX ADMIN — INSULIN LISPRO 2 UNITS: 100 INJECTION, SOLUTION INTRAVENOUS; SUBCUTANEOUS at 20:28

## 2022-04-08 RX ADMIN — HEPARIN SODIUM 5000 UNITS: 5000 INJECTION INTRAVENOUS; SUBCUTANEOUS at 06:30

## 2022-04-08 RX ADMIN — DOXYCYCLINE HYCLATE 100 MG: 100 TABLET, FILM COATED ORAL at 08:24

## 2022-04-08 RX ADMIN — HEPARIN SODIUM 5000 UNITS: 5000 INJECTION INTRAVENOUS; SUBCUTANEOUS at 20:28

## 2022-04-08 RX ADMIN — MICONAZOLE NITRATE: 2 POWDER TOPICAL at 10:00

## 2022-04-08 RX ADMIN — CARVEDILOL 3.12 MG: 6.25 TABLET, FILM COATED ORAL at 08:24

## 2022-04-08 ASSESSMENT — PAIN SCALES - GENERAL
PAINLEVEL_OUTOF10: 0
PAINLEVEL_OUTOF10: 0

## 2022-04-08 NOTE — PROGRESS NOTES
Progress Note( Dr. Milton Joseph)  4/8/2022  Subjective:   Admit Date: 3/30/2022  PCP: Brandi Dubose MD    Admitted For : Patient was initially admitted to medical floor for sepsis from UTI and generalized  weakness    Consulted For: Better control of blood glucose    Interval History: Patient was transferred from medical floor on 3/30/2022 and was admitted to acute rehab unit for rehabilitation and physical therapy  Blood sugar control is better        Denies any chest pains,   Denies SOB . Denies nausea or vomiting. No new bowel or bladder symptoms.        Intake/Output Summary (Last 24 hours) at 4/8/2022 1833  Last data filed at 4/8/2022 1300  Gross per 24 hour   Intake 420 ml   Output 800 ml   Net -380 ml       DATA    CBC:   Recent Labs     04/07/22  0615   WBC 9.6   HGB 9.5*       CMP:  Recent Labs     04/06/22  0712      K 4.9      CO2 19*   BUN 64*   CREATININE 2.5*   CALCIUM 9.2   LABALBU 3.3*     Lipids: No results found for: CHOL, HDL, TRIG  Glucose:  Recent Labs     04/08/22  0800 04/08/22  1145 04/08/22  1748   POCGLU 127* 326* 123*     [id number]:  Lab Results   Component Value Date    LABA1C 9.2 03/22/2022     High Sensitivity TSH:   Lab Results   Component Value Date    TSHHS 6.190 03/25/2022     Free T3: No results found for: FT3  Free T4:  Lab Results   Component Value Date    T4FREE 1.15 03/27/2022       No orders to display        Scheduled Medicines   Medications:    fluconazole  100 mg Oral Daily    glipiZIDE  7.5 mg Oral BID AC    insulin lispro  0-9 Units SubCUTAneous Nightly    sodium bicarbonate  650 mg Oral TID    insulin lispro  0-18 Units SubCUTAneous TID WC    miconazole   Topical BID    [Held by provider] insulin glargine  10 Units SubCUTAneous Nightly    heparin (porcine)  5,000 Units SubCUTAneous 3 times per day    sodium chloride flush  5-40 mL IntraVENous 2 times per day    allopurinol  100 mg Oral Daily    alogliptin  6.25 mg Oral Daily    aspirin  81 mg Oral Daily    atorvastatin  40 mg Oral Nightly    carvedilol  3.125 mg Oral BID WC    levothyroxine  50 mcg Oral Daily    pregabalin  50 mg Oral BID    doxycycline hyclate  100 mg Oral 2 times per day      Infusions:    dextrose           Objective:   Vitals: BP (!) 140/69   Pulse 85   Temp 98.6 °F (37 °C) (Oral)   Resp 16   Ht 5' 8\" (1.727 m)   Wt 195 lb 5.2 oz (88.6 kg)   SpO2 100%   BMI 29.70 kg/m²   General appearance: alert and cooperative with exam  Neck: no JVD or bruit  Thyroid : Normal lobes   Lungs: Has Vesicular Breath sounds   Heart:  regular rate and rhythm  Abdomen: soft, non-tender; bowel sounds normal; no masses,  no organomegaly has Surapubic Catheter   Musculoskeletal: Normal  Extremities: extremities normal, , no edema  Neurologic:  Awake, alert, oriented to name, place and time. Cranial nerves II-XII are grossly intact. Motor weakness . Sensory is intact. ,  and gait is abnormal.unstable     Assessment:     Patient Active Problem List:     Gait disturbance     Generalized weakness     Diabetes mellitus (HCC)     Osteoarthritis     Anemia of chronic disorder     Infected implanted bladder sphincter cuff     Escherichia coli urinary tract infection     Hypertension     Sepsis ([address]     Type 2 diabetes mellitus with hypoglycemia without coma ([address]     UTI (urinary tract infection) due to urinary indwelling catheter (HCC)     Hyperkalemia     Acute kidney failure (HCC)     Leg weakness, bilateral     Type 2 diabetes mellitus with neurological manifestations, uncontrolled ([address]     Complicated UTI (urinary tract infection)     Essential hypertension     Severe muscle deconditioning     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 (HCC)     Frequent falls     Chronic kidney disease, stage 3a ([address]     Uncontrolled type 2 diabetes mellitus with peripheral neuropathy (HCC)     Prostate cancer ([address]     Suprapubic catheter ([address]     Sepsis secondary to UTI [facility])      Plan: 1. Reviewed POC blood glucose . Labs and X ray results   2. Reviewed Current Medicines   3. On meal/ Correction bolus Humalog/ Basal Lantus Insulin. On hold for now regime / and   4. Monitor Blood glucose frequently   5. Modified  the dose of Insulin/ other medicines as needed  6. On low-dose Synthroid of 50 mg/day  7. Patient was transferred to acute rehab unit on 3/30//2022   8. Patient to be participating in physical therapy and Occupational Therapy of acute rehab unit  9. Will follow     .      Libseth Perla MD, MD

## 2022-04-08 NOTE — CONSULTS
[facility] Continence Nurse  Consult Note       Bronson Castillo  AGE: 78 y.o. GENDER: male  : 1940  TODAY'S DATE:  2022    Subjective:     Reason for CWOCN Evaluation and Assessment: wound reassessment      Bronson Castillo is a 78 y.o. 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     PROSTATE SURGERY      PROSTATECTOMY      infection       FAMILY HISTORY    Family History   Problem Relation Age of Onset    Cancer Mother     Diabetes Father     Heart Disease Father     High Blood Pressure Father     Diabetes Sister     High Blood Pressure Sister     Cancer Brother         prostate, breast    Diabetes Brother     Cancer Maternal Uncle     Diabetes Maternal Grandmother     Stroke Maternal Grandfather        SOCIAL HISTORY    Social History     Tobacco Use    Smoking status: Former Smoker     Packs/day: 0.50     Quit date: 1976     Years since quittin.8    Smokeless tobacco: Never Used   Vaping Use    Vaping Use: Not on file   Substance Use Topics    Alcohol use: No     Comment: Quit in     Drug use: No       ALLERGIES    No Known Allergies    MEDICATIONS    No current facility-administered medications on file prior to encounter. Current Outpatient Medications on File Prior to Encounter   Medication Sig Dispense Refill    allopurinol (ZYLOPRIM) 100 MG tablet Take 100 mg by mouth daily      atorvastatin (LIPITOR) 80 MG tablet Take 80 mg by mouth daily      pregabalin (LYRICA) 50 MG capsule Take 50 mg by mouth 2 times daily.  albuterol sulfate HFA (PROVENTIL HFA) 108 (90 Base) MCG/ACT inhaler Inhale 2 puffs into the lungs every 4 hours as needed for Wheezing or Shortness of Breath With spacer (and mask if indicated). Thanks. 18 g 1    acetaminophen (TYLENOL) 325 MG tablet Take 2 tablets by mouth every 6 hours as needed for Pain 120 tablet 3    amLODIPine (NORVASC) 10 MG tablet Take 1 tablet by mouth daily 30 tablet 0    lisinopril (PRINIVIL;ZESTRIL) 5 MG tablet Take 1 tablet by mouth daily 30 tablet 2    Insulin Detemir (LEVEMIR SC) Inject 40 Units into the skin In the morning      Insulin Detemir (LEVEMIR FLEXPEN SC) Inject 10 Units into the skin nightly      docusate sodium (COLACE, DULCOLAX) 100 MG CAPS Take 100 mg by mouth 2 times daily as needed for Constipation. 20 capsule 0    glimepiride (AMARYL) 4 MG tablet Take 4 mg by mouth every morning (before breakfast).            Objective:      BP (!) 149/98   Pulse 95   Temp 98.6 °F (37 °C) (Oral)   Resp 16   Ht 5' 8\" (1.727 m)   Wt 195 lb 5.2 oz (88.6 kg)   SpO2 99%   BMI 29.70 kg/m²   Judd Risk Score: Judd Scale Score: 10    LABS    CBC:   Lab Results   Component Value Date    WBC 9.6 04/07/2022    RBC 3.23 04/07/2022    HGB 9.5 04/07/2022    HCT 31.2 04/07/2022    MCV 96.6 04/07/2022    MCH 29.4 04/07/2022    MCHC 30.4 04/07/2022    RDW 16.2 04/07/2022     04/07/2022    MPV 10.8 04/07/2022     CMP:    Lab Results   Component Value Date     04/06/2022    K 4.9 04/06/2022    K 3.9 02/12/2018     04/06/2022    CO2 19 04/06/2022    BUN 64 04/06/2022    CREATININE 2.5 04/06/2022    GFRAA 30 04/06/2022    LABGLOM 25 04/06/2022    GLUCOSE 105 04/06/2022    PROT 6.2 04/04/2022    PROT 7.6 10/27/2012    LABALBU 3.3 04/06/2022    CALCIUM 9.2 04/06/2022    BILITOT 0.2 04/04/2022    ALKPHOS 108 04/04/2022    AST 14 04/04/2022    ALT 12 04/04/2022     Albumin:    Lab Results   Component Value Date    LABALBU 3.3 04/06/2022     PT/INR:    Lab Results   Component Value Date    PROTIME 15.5 03/17/2022    PROTIME 15.2 01/24/2011    INR 1.20 03/17/2022     HgBA1c:    Lab Results   Component Value Date    LABA1C 9.2 03/22/2022         Assessment:     Patient Active Problem List   Diagnosis    Gait disturbance    Generalized weakness    Diabetes mellitus ([address]    Osteoarthritis    Anemia of chronic disorder    Infected implanted bladder sphincter cuff    Escherichia coli urinary tract infection    Hypertension    Sepsis ([address]    Type 2 diabetes mellitus with hypoglycemia without coma ([address]    UTI (urinary tract infection) due to urinary indwelling catheter (HCC)    Hyperkalemia    Acute kidney failure (HCC)    Leg weakness, bilateral    Type 2 diabetes mellitus with neurological manifestations, uncontrolled ([address]    Complicated UTI (urinary tract infection)    Essential hypertension    Severe muscle deconditioning    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 (HCC)    Frequent falls    Chronic kidney disease, stage 3a ([address]    Uncontrolled type 2 diabetes mellitus with peripheral neuropathy (HCC)    Prostate cancer ([address]    Suprapubic catheter ([address]    Sepsis due to urinary tract infection ([address]    Coagulase negative Staphylococcus bacteremia    Chronic kidney disease, stage IV (severe) (HCC)    Acute metabolic encephalopathy    SVT (supraventricular tachycardia) (HCC)    Metabolic encephalopathy    History of prostate cancer    Cigarette nicotine dependence without complication       Measurements:       Response to treatment:  Well tolerated by patient. Pain Assessment:  Severity:  none  Quality of pain: na  Wound Pain Timing/Severity: na  Premedicated: no    Plan:     Plan of Care: [REMOVED] Wound 03/22/22 Heel Left;Lateral-Dressing/Treatment: Open to air  [REMOVED] Wound 03/22/22 Groin Left;Right; Inner red and excoriated-Dressing/Treatment: Interdry Ag/wicking fabric with Ag  [REMOVED] Wound 03/22/22 Mid abdominal folds super pubic area redness-Dressing/Treatment: Interdry Ag/wicking fabric with Ag     Pt in bed. Agreeable to skin reassessment. Left heel with possible scar tissue/healed DTI. Does not appear active. Has suprapubic catheter in abdominal fold. Some moisture noted. Recommend to continue Interdry. Has depends on and appears to be rubbing right thigh. Recommend to keep depends off while in bed. Updated nurse. Buttock/sacrum appears intact with scar tissue. OT now working with pt. Order for rental Skin Guard mattress placed due to judd of 10. Pt is a high risk for skin breakdown AEB Judd. Follow Judd orders. Specialty Bed Required : yes  [x] Low Air Loss   [x] Pressure Redistribution  [] Fluid Immersion  [] Bariatric  [] Total Pressure Relief  [] Other:     Discharge Plan:  Placement for patient upon discharge: tbd  Hospice Care: no  Patient appropriate for Outpatient [address]: no    Patient/Caregiver Teaching:  Level of patient/caregiver understanding able to:   Needs reinforcement.         Electronically signed by Maria Esther Starr RN, [id number] on 4/8/2022 at 10:37 AM

## 2022-04-08 NOTE — PROGRESS NOTES
Nephrology Progress Note  4/8/2022 4:04 PM  Subjective: Interval History: Michelle Espinosa is a 78 y.o. male doing okay today resting in bed no acute distress    Data:   Scheduled Meds:   fluconazole  100 mg Oral Daily    glipiZIDE  7.5 mg Oral BID AC    insulin lispro  0-9 Units SubCUTAneous Nightly    sodium bicarbonate  650 mg Oral TID    insulin lispro  0-18 Units SubCUTAneous TID WC    miconazole   Topical BID    [Held by provider] insulin glargine  10 Units SubCUTAneous Nightly    heparin (porcine)  5,000 Units SubCUTAneous 3 times per day    sodium chloride flush  5-40 mL IntraVENous 2 times per day    allopurinol  100 mg Oral Daily    alogliptin  6.25 mg Oral Daily    aspirin  81 mg Oral Daily    atorvastatin  40 mg Oral Nightly    carvedilol  3.125 mg Oral BID WC    levothyroxine  50 mcg Oral Daily    pregabalin  50 mg Oral BID    doxycycline hyclate  100 mg Oral 2 times per day     Continuous Infusions:   dextrose           CBC   Recent Labs     04/07/22  0615   WBC 9.6   HGB 9.5*   HCT 31.2*         BMP   Recent Labs     04/06/22  0712      K 4.9      CO2 19*   PHOS 5.4*   BUN 64*   CREATININE 2.5*     Hepatic:   No results for input(s): AST, ALT, ALB, BILITOT, ALKPHOS in the last 72 hours. Troponin: No results for input(s): TROPONINI in the last 72 hours. BNP: No results for input(s): BNP in the last 72 hours. Lipids: No results for input(s): CHOL, HDL in the last 72 hours. Invalid input(s): LDLCALCU  ABGs: No results found for: PHART, PO2ART, ITS3LTL  INR: No results for input(s): INR in the last 72 hours.   Renal Labs  Albumin:    Lab Results   Component Value Date    LABALBU 3.3 04/06/2022     Calcium:    Lab Results   Component Value Date    CALCIUM 9.2 04/06/2022     Phosphorus:    Lab Results   Component Value Date    PHOS 5.4 04/06/2022     U/A:    Lab Results   Component Value Date    NITRU NEGATIVE 04/06/2022    NITRU NEGATIVE 03/23/2013    COLORU COLORLESS 04/06/2022    WBCUA 544 04/06/2022    RBCUA 64 04/06/2022    MUCUS RARE 04/06/2022    TRICHOMONAS NONE SEEN 04/06/2022    YEAST FEW 04/06/2022    BACTERIA NEGATIVE 04/06/2022    CLARITYU CLOUDY 04/06/2022    SPECGRAV 1.015 04/06/2022    UROBILINOGEN NORMAL 04/06/2022    BILIRUBINUR NEGATIVE 04/06/2022    BLOODU LARGE 04/06/2022    GLUCOSEU 1,000 03/23/2013    KETUA NEGATIVE 04/06/2022    AMORPHOUS RARE 11/16/2021     ABG:  No results found for: PHART, DOC3TNZ, PO2ART, OPB9HET, BEART, THGBART, UCQ9GBD, W5HGUEIW  HgBA1c:    Lab Results   Component Value Date    LABA1C 9.2 03/22/2022     Microalbumen/Creatinine ratio:  No components found for: RUCREAT  TSH:  No results found for: TSH  IRON:    Lab Results   Component Value Date    IRON 15 03/22/2022     Iron Saturation:  No components found for: PERCENTFE  TIBC:    Lab Results   Component Value Date    TIBC 214 03/22/2022     FERRITIN:    Lab Results   Component Value Date    FERRITIN 352 03/22/2022     RPR:  No results found for: RPR  MITALI:  No results found for: ANATITER, MITALI  24 Hour Urine for Creatinine Clearance:  No components found for: CREAT4, UHRS10, UTV10      Objective:   I/O: 04/07 0701 - 04/08 0700  In: 120 [P.O.:120]  Out: 1450 [Urine:1450]  I/O last 3 completed shifts: In: 120 [P.O.:120]  Out: 2350 [Urine:2350]  I/O this shift: In: 5 [P.O.:420]  Out: -   Vitals: BP (!) 140/69   Pulse 85   Temp 98.6 °F (37 °C) (Oral)   Resp 16   Ht 5' 8\" (1.727 m)   Wt 195 lb 5.2 oz (88.6 kg)   SpO2 100%   BMI 29.70 kg/m²  {  General appearance: awake weak  HEENT: Head: Normal, normocephalic, atraumatic. Neck: supple, symmetrical, trachea midline  Lungs: diminished breath sounds bilaterally  Heart: S1, S2 normal  Abdomen: abnormal findings:  soft nt  Extremities: edema trace  Neurologic: Mental status: alertness: Weak tired        Assessment and Plan:      IMP:    1.   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

## 2022-04-08 NOTE — PROGRESS NOTES
Progress Note( Dr. Adelfo Rich)    Subjective:   Admit Date: 3/30/2022  PCP: Parul Agosto MD    Admitted For : Patient was initially admitted to medical floor for sepsis from UTI and generalized  weakness    Consulted For: Better control of blood glucose    Interval History: Patient was transferred from medical floor on 3/30/2022 and was admitted to acute rehab unit for rehabilitation and physical therapy  Blood sugar control is better        Denies any chest pains,   Denies SOB . Denies nausea or vomiting. No new bowel or bladder symptoms.        Intake/Output Summary (Last 24 hours) at 4/8/2022 1838  Last data filed at 4/8/2022 1300  Gross per 24 hour   Intake 420 ml   Output 800 ml   Net -380 ml       DATA    CBC:   Recent Labs     04/07/22  0615   WBC 9.6   HGB 9.5*       CMP:  Recent Labs     04/06/22  0712      K 4.9      CO2 19*   BUN 64*   CREATININE 2.5*   CALCIUM 9.2   LABALBU 3.3*     Lipids: No results found for: CHOL, HDL, TRIG  Glucose:  Recent Labs     04/08/22  0800 04/08/22  1145 04/08/22  1748   POCGLU 127* 326* 123*     LniamlfuhlZ2N:  Lab Results   Component Value Date    LABA1C 9.2 03/22/2022     High Sensitivity TSH:   Lab Results   Component Value Date    TSHHS 6.190 03/25/2022     Free T3: No results found for: FT3  Free T4:  Lab Results   Component Value Date    T4FREE 1.15 03/27/2022       No orders to display        Scheduled Medicines   Medications:    fluconazole  100 mg Oral Daily    glipiZIDE  7.5 mg Oral BID AC    insulin lispro  0-9 Units SubCUTAneous Nightly    sodium bicarbonate  650 mg Oral TID    insulin lispro  0-18 Units SubCUTAneous TID WC    miconazole   Topical BID    [Held by provider] insulin glargine  10 Units SubCUTAneous Nightly    heparin (porcine)  5,000 Units SubCUTAneous 3 times per day    sodium chloride flush  5-40 mL IntraVENous 2 times per day    allopurinol  100 mg Oral Daily    alogliptin  6.25 mg Oral Daily    aspirin  81 mg Oral Daily    atorvastatin  40 mg Oral Nightly    carvedilol  3.125 mg Oral BID     levothyroxine  50 mcg Oral Daily    pregabalin  50 mg Oral BID    doxycycline hyclate  100 mg Oral 2 times per day      Infusions:    dextrose           Objective:   Vitals: BP (!) 140/69   Pulse 85   Temp 98.6 °F (37 °C) (Oral)   Resp 16   Ht 5' 8\" (1.727 m)   Wt 195 lb 5.2 oz (88.6 kg)   SpO2 100%   BMI 29.70 kg/m²   General appearance: alert and cooperative with exam  Neck: no JVD or bruit  Thyroid : Normal lobes   Lungs: Has Vesicular Breath sounds   Heart:  regular rate and rhythm  Abdomen: soft, non-tender; bowel sounds normal; no masses,  no organomegaly has Surapubic Catheter   Musculoskeletal: Normal  Extremities: extremities normal, , no edema  Neurologic:  Awake, alert, oriented to name, place and time. Cranial nerves II-XII are grossly intact. Motor weakness . Sensory is intact. ,  and gait is abnormal.unstable     Assessment:     Patient Active Problem List:     Gait disturbance     Generalized weakness     Diabetes mellitus (HCC)     Osteoarthritis     Anemia of chronic disorder     Infected implanted bladder sphincter cuff     Escherichia coli urinary tract infection     Hypertension     Sepsis ([address]     Type 2 diabetes mellitus with hypoglycemia without coma ([address]     UTI (urinary tract infection) due to urinary indwelling catheter (HCC)     Hyperkalemia     Acute kidney failure (HCC)     Leg weakness, bilateral     Type 2 diabetes mellitus with neurological manifestations, uncontrolled ([address]     Complicated UTI (urinary tract infection)     Essential hypertension     Severe muscle deconditioning     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 (HCC)     Frequent falls     Chronic kidney disease, stage 3a ([address]     Uncontrolled type 2 diabetes mellitus with peripheral neuropathy (HCC)     Prostate cancer ([address]     Suprapubic catheter ([address]     Sepsis secondary to UTI [facility])      Plan: 1. Reviewed POC blood glucose . Labs and X ray results   2. Reviewed Current Medicines   3. On meal/ Correction bolus Humalog/ Basal Lantus Insulin. On hold for now regime / and   4. Monitor Blood glucose frequently   5. Modified  the dose of Insulin/ other medicines as needed  6. On low-dose Synthroid of 50 mg/day  7. Patient was transferred to acute rehab unit on 3/30//2022   8. Patient to be participating in physical therapy and Occupational Therapy of acute rehab unit  9. Will follow     .      Madelin Garcia MD, MD

## 2022-04-08 NOTE — PROGRESS NOTES
Occupational Therapy    Physical Rehabilitation: OCCUPATIONAL THERAPY     [x] daily progress note       [] discharge       Patient Name:  Lita Corcoran   :  1940 MRN: [id number]  Room:  [address] [place] [address] Date of Admission: 3/30/2022  Rehabilitation Diagnosis:   Metabolic encephalopathy [D29.31]  Metabolic encephalopathy [L93.34]       Date 2022       Day of ARU Week:  3   Time IN/OUT 1464-3605  9461-1930   Individual Tx Minutes 70+53   Group Tx Minutes    Co-Treat Minutes    Concurrent Tx Minutes    TOTAL Tx Time Mins 120   Variance Time    Variance Time []   Refusal due to:     []   Medical hold/reason:    []   Illness   []   Off Unit for test/procedure  []   Extra time needed to complete task  []   Therapeutic need  []   Other (specify):   Restrictions Restrictions/Precautions: General Precautions,Fall Risk,Contact Precautions (suprapubic catheter)         Communication with other providers: [x]   OK to see per nursing:     []   Spoke with team member regarding:      Subjective observations and cognitive status: Pt in bed with wound care in room checking pt's wounds. Pt alert and agreeable to therapy. Pt sitting in wc on approach; pleasant and agreeable to therapy. Pain level/location:    /10       Location:    Discharge recommendations  Anticipated discharge date:   Destination: []?home alone   []?home alone w assist prn   []? home w/ family    [x]? Continuous supervision       []? SNF    []? Assisted living     []? Other:   Continued therapy: []?HHC OT  []? OUTPATIENT  OT   []?  No Further OT  Equipment needs: none        ADLs:    Oral Hygiene: Oral Hygiene  Assistance Needed: Supervision or touching assistance  Comment: seated at sink, [facility]  CARE Score: 4  Discharge Goal: Independent    UB/LB Bathing: Shower/Bathe Self  Assistance Needed: Partial/moderate assistance  Comment: Pt able to UB, abdomen, thighs and Bilateral feet with LHS; Pt required assist to bathe bottom  CARE Score: 3  Discharge Goal: Supervision or touching assistance    UB Dressing: Upper Body Dressing  Assistance Needed: Partial/moderate assistance  Comment: P able to doff shirt and thread BUEs; required assist to pull overhead  CARE Score: 3  Discharge Goal: Set-up or clean-up assistance         LB Dressing: Lower Body Dressing  Assistance Needed: Substantial/maximal assistance  Comment: Pt able to thread BLEs in brief using reacher and manage over hips c min A; D/t limited time Pt requird assist to thread BLEs into pants and perform pants management up  CARE Score: 2  Discharge Goal: Supervision or touching assistance    Donning and Colon Footwear: Putting On/Taking Off Footwear  Assistance Needed: Partial/moderate assistance  Comment: Min A to doff socks using reacher; Pt able to don socks using sock aid  CARE Score: 3  Discharge Goal: Supervision or touching assistance      Toileting: Toileting Hygiene  Assistance Needed: Substantial/maximal assistance  Comment: able to manage depends down, required assist for BM hygiene and pants management for balance  CARE Score: 2  Discharge Goal: Supervision or touching assistance      Toilet Transfers:    Mod A  Toilet Transfer  Assistance Needed: Partial/moderate assistance  Comment: required Mod A to<>from  c max cues to fully turn  CARE Score: 3  Discharge Goal: Supervision or touching assistance  Device Used:    [x]   Standard Toilet         [x]   Grab Bars           []  Bedside Commode       []   Elevated Toilet          []   Other:        Bed Mobility:           []   Pt received out of bed   Supine --> Sit:  Min A       Transfers:    Sit--> Stand:  Min A c cues for upright posture and hand placement  Stand --> Sit:   CGA c cues for hand placement and controlled descent   Stand-Pivot:   CGA c max cues to turn fully to [facility]   Other:    Assistive device required for transfer:   RW       Functional Mobility:  AM:To bathroom      PM: To increase BUE strength and endurance for functional transfers and ADLs, Pt self propelled WC 27 ft + functional mobility of 70 ft   Assistance:  CGA   Device:   [x]   Rolling Walker     []   Standard Walker []   Wheelchair        []   [employer]       []   4-Wheeled Snipshot         []   Cardiac Walker       []   Other:          Additional Therapeutic activities/exercises completed this date:     [x]   ADL Training   []   Balance/Postural training     []   Bed/Transfer Training   []   Endurance Training   []   Neuromuscular Re-ed   []   Nu-step:  Time:        Level:         #Steps:       [x]   Rebounder:    [x]  Seated     []  Standing To increase UB strength and core strength, pt engaged in seated rebounder exercises c ball 3 sets of 15 reps         []   Supine Ther Ex (reps/sets):     [x]   Seated Ther Ex (reps/sets): To increase UB strength for functional transfers and ADLs, Pt completed 3 sets of 10 reps c 28.8# on rickshaw.    []   Standing Ther Ex (reps/sets):     []   Other:      Comments:      Patient/Caregiver Education and Training:   []   [employer] Equipment Use  []   Bed Mobility/Transfer Technique/Safety  []   Energy Conservation Tips  []   Family training  []   Postural Awareness  []   Safety During Functional Activities  []   Reinforced Patient's Precautions   []   Progress was updated and reviewed in Rehabtracker with patient and/or family this         date.     Treatment Plan for Next Session: Continue OT POC           Treatment/Activity Tolerance:   [x] Tolerated treatment with no adverse effects    [] Patient limited by fatigue  [] Patient limited by pain   [] Patient limited by medical complications:    [] Adverse reaction to Tx:   [] Significant change in status    Safety:       []  bed alarm set    [x]  chair alarm set    []  Pt refused alarms                []  Telesitter activated      [x]  Gait belt used during tx session      [x]other:   Left pt with PTJosefa Arredondo after AM session     Number of Minutes/Billable Intervention  Therapeutic Exercise 15   ADL Self-care 70   Neuro Re-Ed    Therapeutic Activity 38   Group    Other:    TOTAL 123       Social History  Social/Functional History  Lives With: Son  Type of Home: House  Home Layout: One level  Home Access: Ramped entrance  Bathroom Shower/Tub: Tub/Shower unit,Shower chair with back  Bathroom Toilet: Standard  Bathroom Equipment: Grab bars in shower,Shower chair  Bathroom Accessibility: [facility] accessible (states once he is in bathroom he uses countertops and grab bars, but pt not able to clarify if this is because there is little room or just preference)  Home Equipment: [address] walker (pt thinks there may be a BSC from his wife in storage, but unsure)  ADL Assistance: Independent  Homemaking Assistance: Independent  Homemaking Responsibilities: Yes  Meal Prep Responsibility: Secondary  Laundry Responsibility: Primary  Cleaning Responsibility: Secondary  Bill Paying/Finance Responsibility: Primary  Shopping Responsibility: Secondary  Health Care Management: Primary (has pill box with alarms per pt. until recently [address] would set up, but once [address] stopped he managed on own)  Ambulation Assistance: Independent (mod I with 4ww for up to 150'. pt used electronic carts at stores when able)  Transfer Assistance: Independent  Active : No  Patient's  Info: 3 sons assist in driving. Mode of Transportation: Car,SUV  Occupation: Retired  Type of occupation: Retired from [address] [place] [address] Street: TV, very little reading, no pets, doctors, son manages groceries,  Meds are managed via pill dispenser that son manages. Pt occasionally writes checks and some auto pay  Additional Comments: pt sleeps in flat bed. Pt has fallen >4 times in past year. Pt attributes many to low BS.  No injury    Objective                                                                                    Goals:  (Update in navigator)  Short term goals  Time Frame for Short term goals: STGs=LTGs:  Long term goals  Time Frame for Long term goals : 10-14 days or until d/c  Long term goal 1: Pt will complete grooming tasks Ind seated  Long term goal 2: Pt will complete total body bathing c S  Long term goal 3: Pt will complete UB dressing c setup  Long term goal 4: Pt will complete LB dressing c supervision using AE PRN  Long term goal 5: Pt will doff/don footwear c supervision using AE PRN  Long term goals 6: Pt will complete toileting c S  Long term goal 7: Pt will complete functional transfers (bed, chair, toilet, shower) c DME PRN and S  Long term goal 8: Pt will perform therex/therax to facilitate increased strength/endurance/ax tolerance (c emphasis on dynamic standing balance >8 mins, BUE endurance, cognitive retraining) c SBA:        Plan of Care                                                                              Times per week: 5 days per week for a minimum of 60 minutes/day plus group as appropriate for 60 minutes.   Treatment to include Plan  Times per day: Daily  Current Treatment Recommendations: Strengthening,ROM,Balance Training,Functional Mobility Training,Endurance [employer] Management,Safety Education & Training,Patient/Caregiver Education & Training,Equipment Evaluation, Education, & procurement,Self-Care / ADL,Home Management Training,Cognitive/Perceptual Training    Electronically signed by   KHARI Lacey,  4/8/2022, 12:50 PM

## 2022-04-08 NOTE — PROGRESS NOTES
Faheem Thompson    : 1940  Acct #: [de-identified]  MRN: [id number]              PM&R Progress Note      Admitting diagnosis: ***    Comorbid diagnoses impacting rehabilitation: ***    Chief complaint: ***    Prior (baseline) level of function: Independent.     Current level of function:         Current  IRF-LESTER and Goals:   Occupational Therapy:    Short term goals  Time Frame for Short term goals: STGs=LTGs :   Long term goals  Time Frame for Long term goals : 10-14 days or until d/c  Long term goal 1: Pt will complete grooming tasks Ind seated  Long term goal 2: Pt will complete total body bathing c S  Long term goal 3: Pt will complete UB dressing c setup  Long term goal 4: Pt will complete LB dressing c supervision using AE PRN  Long term goal 5: Pt will doff/don footwear c supervision using AE PRN  Long term goals 6: Pt will complete toileting c S  Long term goal 7: Pt will complete functional transfers (bed, chair, toilet, shower) c DME PRN and S  Long term goal 8: Pt will perform therex/therax to facilitate increased strength/endurance/ax tolerance (c emphasis on dynamic standing balance >8 mins, BUE endurance, cognitive retraining) c SBA :                                       Eating: Eating  Assistance Needed: Setup or clean-up assistance  Comment: assist to open packages/containers  CARE Score: 5  Discharge Goal: Independent       Oral Hygiene: Oral Hygiene  Assistance Needed: Supervision or touching assistance  Comment: seated, cues for thoroughness  CARE Score: 4  Discharge Goal: Independent    UB/LB Bathing: Shower/Bathe Self  Assistance Needed: Partial/moderate assistance  Comment: performed UB bathing seated, required assist to thoroughly bathe perineal area  CARE Score: 3  Discharge Goal: Supervision or touching assistance    UB Dressing: Upper Body Dressing  Assistance Needed: Partial/moderate assistance  Comment: able to thread BUEs in shirt, required assist to pull overhead  CARE Score: 3  Discharge Goal: Set-up or clean-up assistance         LB Dressing: Lower Body Dressing  Assistance Needed: Dependent  Comment: unable to trial reacher training today 2* fatigue and time constraints, required total A to thread BLEs in brief/pants (catheter also) and perform pants management up  CARE Score: 1  Discharge Goal: Supervision or touching assistance    Donning and [place] Footwear: Putting On/Taking Off Footwear  Assistance Needed: Dependent  Comment: total A to doff/don hospital socks  CARE Score: 1  Discharge Goal: Supervision or touching assistance      Toileting: Toileting Hygiene  Assistance Needed: Substantial/maximal assistance  Comment: able to manage Depends down, required assist for thorough bowel hygiene and pants management up 2* fatigue  CARE Score: 2  Discharge Goal: Supervision or touching assistance      Toilet Transfers: Toilet Transfer  Assistance Needed: Partial/moderate assistance  Comment:  Mod A to<>from W/C, pt with poor BLE extension and very flexed posture, not fully turning to W/C 2* fatigue  CARE Score: 3  Discharge Goal: Supervision or touching assistance    Physical Therapy:         Long term goals  Time Frame for Long term goals : 12-14 days  Long term goal 1: Pt will perform bed mobility with mod I  Long term goal 2: Pt will perform sit to stand and pivot transfers with mod I, car transfers with CGA  Long term goal 3: Pt will ambulate 10' with 2ww with mod I, up to 150' with supervision including uneven surfaces  Long term goal 4: Pt will ascend/descend curb step with 2ww and up to 4 steps with B rails with supervision  Long term goal 5: Pt will  object from floor with 2ww and reacher with supervision      Bed Mobility:   Sit to Lying  Assistance Needed: Substantial/maximal assistance  Comment: max assist for B LE, improved control of trunk  CARE Score: 2  Discharge Goal: Independent  Roll Left and Right  Assistance Needed: Substantial/maximal assistance  Comment: max assist with rails  CARE Score: 2  Discharge Goal: Independent  Lying to Sitting on Side of Bed  Assistance Needed: Partial/moderate assistance  Comment: mod assist for trunk  CARE Score: 3  Discharge Goal: Independent    Transfers:    Sit to Stand  Assistance Needed: Partial/moderate assistance  Comment: varies min to mod assist dependent on height of surface  Reason if not Attempted: Not attempted due to medical condition or safety concerns  CARE Score: 3  Discharge Goal: Independent  Chair/Bed-to-Chair Transfer  Assistance Needed: Partial/moderate assistance  Comment: mod assist with 2ww and max time allowed  Reason if not Attempted: Not attempted due to medical condition or safety concerns  CARE Score: 3  Discharge Goal: Independent  Toilet Transfer  Assistance Needed: Substantial/maximal assistance  Comment: max assist and max verbal cues for techique, posture, safety  CARE Score: 2  Car Transfer  Comment: unable to attempt due to time contraint as pt felt he needed to use toilet  Reason if not Attempted: Not attempted due to medical condition or safety concerns  CARE Score: 88  Discharge Goal: Supervision or touching assistance    Ambulation:    Walking Ability  Does the Patient Walk?: Yes     Walk 10 Feet  Assistance Needed: Partial/moderate assistance  Comment: min assist with 2ww and max time allowed, leaning to R with cues for L weightshift and R step length  Reason if not Attempted: Not attempted due to medical condition or safety concerns  CARE Score: 3  Discharge Goal: Independent     Walk 50 Feet with Two Turns  Reason if not Attempted: Not attempted due to medical condition or safety concerns  CARE Score: 88  Discharge Goal: Supervision or touching assistance     Walk 150 Feet  Reason if not Attempted: Not attempted due to medical condition or safety concerns  CARE Score: 88  Discharge Goal: Supervision or touching assistance     Walking 10 Feet on Uneven Surfaces  Reason if not Attempted: Not attempted due to medical condition or safety concerns  CARE Score: 88  Discharge Goal: Supervision or touching assistance     1 Step (Curb)  Reason if not Attempted: Not attempted due to medical condition or safety concerns  CARE Score: 88  Discharge Goal: Supervision or touching assistance     4 Steps  Reason if not Attempted: Not attempted due to medical condition or safety concerns  CARE Score: 88  Discharge Goal: Supervision or touching assistance     12 Steps  Reason if not Attempted: Not applicable  CARE Score: 9  Discharge Goal: Not Applicable       Wheelchair:  w/c Ability: Wheelchair Ability  Uses a Wheelchair and/or Scooter?: No  Wheel 50 Feet with Two Turns  Assistance Needed: Partial/moderate assistance  Comment: 30' max distance  CARE Score: 3             Balance:        Object: Picking Up Object  Assistance Needed: Substantial/maximal assistance  Comment: started with mod assist as he took off his L hand(due to leaning to R), and able to grasp object, then as pt bringing it up, lost balance posterior and needed max assist to recover  Reason if not Attempted: Not attempted due to medical condition or safety concerns  CARE Score: 2  Discharge Goal: Supervision or touching assistance    I      Exam:    Blood pressure (!) 145/72, pulse 79, temperature 97.3 °F (36.3 °C), temperature source Oral, resp. rate 17, height 5' 8\" (1.727 m), weight 188 lb 4.4 oz (85.4 kg), SpO2 99 %. General: ***    HEENT: ***    Pulmonary: ***    Cardiac: ***    Abdomen: Patient's abdomen is soft and nondistended. Bowel sounds were present throughout. There was no rebound, guarding or masses noted. Upper extremities: ***    Lower extremities: ***    Sitting balance was ***. Standing balance was ***.     Lab Results   Component Value Date    WBC 9.6 04/07/2022    HGB 9.5 (L) 04/07/2022    HCT 31.2 (L) 04/07/2022    MCV 96.6 04/07/2022     04/07/2022     Lab Results   Component Value Date    INR 1.20 03/17/2022 INR 0.99 05/19/2015    INR 1.31 03/23/2013    PROTIME 15.5 (H) 03/17/2022    PROTIME 11.3 05/19/2015    PROTIME 14.2 03/23/2013     Lab Results   Component Value Date    CREATININE 2.5 (H) 04/06/2022    BUN 64 (H) 04/06/2022     04/06/2022    K 4.9 04/06/2022     04/06/2022    CO2 19 (L) 04/06/2022     Lab Results   Component Value Date    ALT 12 04/04/2022    AST 14 (L) 04/04/2022    ALKPHOS 108 04/04/2022    BILITOT 0.2 04/04/2022       Expected length of stay  prior to a supervised level of function for discharge home with a walker and HHC OT/PT is ***    Recommendations:    ***

## 2022-04-08 NOTE — PROGRESS NOTES
Physical Therapy  . [x] daily progress note       [x] discharge       Patient Name:  Dav Stone   :  1940 MRN: [id number]  Room:  [address] [place], [place] [address] Date of Admission: 3/30/2022  Rehabilitation Diagnosis:   Metabolic encephalopathy [P62.07]  Metabolic encephalopathy [J16.39]       Date 2022       Day of ARU Week:  3   Time IN//1045   Individual Tx Minutes 60   Group Tx Minutes    Co-Treat Minutes    Concurrent Tx Minutes    TOTAL Tx Time Mins 60   Variance Time    Variance Time []   Refusal due to:     []   Medical hold/reason:    []   Illness   []   Off Unit for test/procedure  []   Extra time needed to complete task  []   Therapeutic need  []   Other (specify):   Restrictions Restrictions/Precautions  Restrictions/Precautions: General Precautions,Fall Risk,Contact Precautions (suprapubic catheter)      Interdisciplinary communication [x]   Cleared for therapy per nursing     []   RN notified about issues during session  []   RN updated on pt performance  [x]   Spoke with : f shared that feel pt will not be able to manage in home alone for any amount of time during the day. Son has already stated he cannot provide more assist.   []   Spoke with OT  []   Spoke with MD  []   Other:    Subjective observations and cognitive status: Handoff from OT. At beginning of session pt called son and told him \"I'm in town now, I'll bring the car by soon. \" At end of session pt stated he was going to get his coat on and take the car to his son down in [place] [facility]. Reoriented pt both times. Pt aware he was in hospital, but did not understand why his car was not there and why he could not drive it. Pain level/location: 0  /10       Location:    Discharge recommendations  Anticipated discharge date:    Destination: unclear at this time.  Feel pt will need 24 hour care which son cannot provide   []home alone   []home alone with assist PRN     [] home w/ family      [] Continuous supervision  []SNF    [] Assisted living     [] Other:  Continued therapy: []HHC PT  []OUTPATIENT PT   [] No Further PT  []SNF PT  Caregiver training recommended: []Yes  [] No   Equipment needs: 2ww     PT IRF-LESTER scores and goals for discharge assessment:     Sit to Stand  Assistance Needed: Partial/moderate assistance  Comment: CG to min assist from w/c variable  CARE Score: 3  Discharge Goal: Independent    Chair/Bed-to-Chair Transfer  Assistance Needed: Partial/moderate assistance  Comment: mod assist with 2ww and max time allowed  CARE Score: 3  Discharge Goal: Independent        Car Transfer  Assistance Needed: Partial/moderate assistance  Comment: mod assist for upper body getting out of car  CARE Score: 3  Discharge Goal: Supervision or touching assistance    Walk 10 Feet? Walk 10 Feet?: Yes            Wheelchair Ability  Uses a Wheelchair and/or Scooter?: No    Wheel 50 Feet with Two Turns  Assistance Needed: Partial/moderate assistance  Comment: 100' with min assist to keep forward momentum. CARE Score: 3    Wheel 150 Feet  Assistance Needed: Partial/moderate assistance  CARE Score: 3              Walking Ability  Does the Patient Walk?: Yes    Walk 10 Feet  Assistance Needed: Partial/moderate assistance  Comment: min assist with 2ww and max time allowed  CARE Score: 3  Discharge Goal: Independent    Walk 50 Feet with Two Turns  Comment: Pt able to walk 87' once, but second time only 39'.  Gait speed of .10 m/s with knees flexed 20-30 degrees, flexed trunk, shuffling steps  Reason if not Attempted: Not attempted due to medical condition or safety concerns  CARE Score: 88  Discharge Goal: Supervision or touching assistance    Walk 150 Feet  Reason if not Attempted: Not attempted due to medical condition or safety concerns  CARE Score: 88  Discharge Goal: Supervision or touching assistance    Walking 10 Feet on Uneven Surfaces  Assistance Needed: Partial/moderate assistance  Comment: min assist with 2ww  CARE Score: 3  Discharge Goal: Supervision or touching assistance    1 Step (Curb)  Assistance Needed: Partial/moderate assistance  Comment: mod assist on 4\" step  CARE Score: 3  Discharge Goal: Supervision or touching assistance    4 Steps  Reason if not Attempted: Not attempted due to medical condition or safety concerns  CARE Score: 88  Discharge Goal: Supervision or touching assistance          Additional Therapeutic activities/exercises completed this date:     []   Nu-step:  Time:        Level:         #Steps:       []   Rebounder:    []  Seated     []  Standing        []   Balance training         []   Postural training    []   Supine ther ex (reps/sets):     []   Seated ther ex (reps/sets):     []   Standing ther ex (reps/sets):     []   Other: Toileting activity completed with    []   Other:    Comments:  Very slow in all movements. Pt had clear thinking during most of session, but then with episodes of confusion as noted. Patient/Caregiver Education and Training:   []   Role of PT  []   Education about Dx  []   Use of call light for assist   []   HEP provided and explained   []   Treatment plan reviewed  []   Home safety  []   Wheelchair mobility/management   []   Body mechanics  [x]   Bed Mobility/Transfer technique  [x]   Gait technique/sequencing  []   Proper use of assistive device/adaptive equipment  [x]   Stair training/Advanced mobility safety and technique  []   Reinforced patient's precautions/mobility while maintaining precautions  []   Postural awareness  []   Family/caregiver training  []   Progress was updated and reviewed in Rehabtracker with patient and/or family this date. []   Other:    Treatment Plan for Next Session: progression of transfers and gait, stairs      Assessment: This pt demonstrated a positive  response to today's treatment as evidenced by improved participation in session. The patient is making slowprogress toward established goals as evidenced by QI scores.  Ongoing deficits are observed in the areas of cognition, weakness, balance and continued focus on all mobility are recommended. Treatment/Activity Tolerance:   [] Tolerated treatment with no adverse effects    [x] Patient limited by fatigue  [] Patient limited by pain   [] Patient limited by medical complications:    [] Adverse reaction to Tx:   [] Significant change in status    Safety:       []  bed alarm set    [x]  chair alarm set    []  Pt refused alarms                []  Telesitter activated      [x]  Gait belt used during tx session      []other:       Number of Minutes/Billable Intervention  Gait Training 30   Therapeutic Exercise    Neuro Re-Ed    Therapeutic Activity 30   Wheelchair Propulsion    Group    Other:    TOTAL 60     Social History  Social/Functional History  Lives With: Son  Type of Home: House  Home Layout: One level  Home Access: Ramped entrance  Bathroom Shower/Tub: Tub/Shower unit,Shower chair with back  Bathroom Toilet: Standard  Bathroom Equipment: Grab bars in shower,Shower chair  Bathroom Accessibility: Gio Sample accessible (states once he is in bathroom he uses countertops and grab bars, but pt not able to clarify if this is because there is little room or just preference)  Home Equipment: Cane,4 wheeled walker,Rolling walker (pt thinks there may be a BSC from his wife in storage, but unsure)  ADL Assistance: Independent  Homemaking Assistance: Independent  Homemaking Responsibilities: Yes  Meal Prep Responsibility: Secondary  Laundry Responsibility: Primary  Cleaning Responsibility: Secondary  Bill Paying/Finance Responsibility: Primary  Shopping Responsibility: Secondary  Health Care Management: Primary (has pill box with alarms per pt. until recently [address] would set up, but once [address] 76 stopped he managed on own)  Ambulation Assistance: Independent (mod I with 4ww for up to 150'.  pt used electronic carts at stores when able)  Transfer Assistance: Independent  Active : No  Patient's  Info: 3 sons assist in driving. Mode of Transportation: Car,SUV  Occupation: Retired  Type of occupation: Retired from [address] [place] [address] Street: TV, very little reading, no pets, doctors, son manages groceries,  Meds are managed via pill dispenser that son manages. Pt occasionally writes checks and some auto pay  Additional Comments: pt sleeps in flat bed. Pt has fallen >4 times in past year. Pt attributes many to low BS. No injury    Objective                                                                                    Goals:  (Update in navigator)   : Long term goals  Time Frame for Long term goals : 12-14 days  Long term goal 1: Pt will perform bed mobility with mod I  Long term goal 2: Pt will perform sit to stand and pivot transfers with mod I, car transfers with CGA  Long term goal 3: Pt will ambulate 10' with 2ww with mod I, up to 150' with supervision including uneven surfaces  Long term goal 4: Pt will ascend/descend curb step with 2ww and up to 4 steps with B rails with supervision  Long term goal 5: Pt will  object from floor with 2ww and reacher with supervision:        Plan of Care                                                                              Times per week: 5 days per week for a minimum of 60 minutes/day plus group as appropriate for 60 minutes.   Treatment to include Current Treatment Recommendations: Strengthening,ROM,Balance Training,Functional Mobility Training,Transfer Training,ADL/Self-care Training,Gait Training,Patient/Caregiver Education & Training,IADL Training,Stair training,Equipment Evaluation, Education, & procurement,Neuromuscular Re-education,Pain Management,Home Exercise MediaWheel Training,Safety Education & Training,Cognitive/Perceptual Training,Cognitive Reorientation    Electronically signed by   Emmanuel Casanova PT,   4/8/2022, 12:56 PM

## 2022-04-09 LAB
ALBUMIN SERPL-MCNC: 3.6 GM/DL (ref 3.4–5)
ANION GAP SERPL CALCULATED.3IONS-SCNC: 18 MMOL/L (ref 4–16)
BASOPHILS ABSOLUTE: 0.1 K/CU MM
BASOPHILS RELATIVE PERCENT: 0.8 % (ref 0–1)
BUN BLDV-MCNC: 84 MG/DL (ref 6–23)
CALCIUM SERPL-MCNC: 9.5 MG/DL (ref 8.3–10.6)
CHLORIDE BLD-SCNC: 102 MMOL/L (ref 99–110)
CO2: 22 MMOL/L (ref 21–32)
CREAT SERPL-MCNC: 2.7 MG/DL (ref 0.9–1.3)
DIFFERENTIAL TYPE: ABNORMAL
EOSINOPHILS ABSOLUTE: 1 K/CU MM
EOSINOPHILS RELATIVE PERCENT: 9.8 % (ref 0–3)
GFR AFRICAN AMERICAN: 27 ML/MIN/1.73M2
GFR NON-AFRICAN AMERICAN: 23 ML/MIN/1.73M2
GLUCOSE BLD-MCNC: 138 MG/DL (ref 70–99)
GLUCOSE BLD-MCNC: 150 MG/DL (ref 70–99)
GLUCOSE BLD-MCNC: 181 MG/DL (ref 70–99)
GLUCOSE BLD-MCNC: 204 MG/DL (ref 70–99)
GLUCOSE BLD-MCNC: 231 MG/DL (ref 70–99)
HCT VFR BLD CALC: 31.4 % (ref 42–52)
HEMOGLOBIN: 9.5 GM/DL (ref 13.5–18)
IMMATURE NEUTROPHIL %: 0.4 % (ref 0–0.43)
LYMPHOCYTES ABSOLUTE: 3.1 K/CU MM
LYMPHOCYTES RELATIVE PERCENT: 30.5 % (ref 24–44)
MCH RBC QN AUTO: 29.7 PG (ref 27–31)
MCHC RBC AUTO-ENTMCNC: 30.3 % (ref 32–36)
MCV RBC AUTO: 98.1 FL (ref 78–100)
MONOCYTES ABSOLUTE: 0.9 K/CU MM
MONOCYTES RELATIVE PERCENT: 9.2 % (ref 0–4)
NUCLEATED RBC %: 0 %
PDW BLD-RTO: 16.5 % (ref 11.7–14.9)
PHOSPHORUS: 6.3 MG/DL (ref 2.5–4.9)
PLATELET # BLD: 307 K/CU MM (ref 140–440)
PMV BLD AUTO: 11.3 FL (ref 7.5–11.1)
POTASSIUM SERPL-SCNC: 4.8 MMOL/L (ref 3.5–5.1)
RBC # BLD: 3.2 M/CU MM (ref 4.6–6.2)
SEGMENTED NEUTROPHILS ABSOLUTE COUNT: 5 K/CU MM
SEGMENTED NEUTROPHILS RELATIVE PERCENT: 49.3 % (ref 36–66)
SODIUM BLD-SCNC: 142 MMOL/L (ref 135–145)
TOTAL IMMATURE NEUTOROPHIL: 0.04 K/CU MM
TOTAL NUCLEATED RBC: 0 K/CU MM
WBC # BLD: 10.2 K/CU MM (ref 4–10.5)

## 2022-04-09 PROCEDURE — 6370000000 HC RX 637 (ALT 250 FOR IP): Performed by: INTERNAL MEDICINE

## 2022-04-09 PROCEDURE — 94150 VITAL CAPACITY TEST: CPT

## 2022-04-09 PROCEDURE — 1280000000 HC REHAB R&B

## 2022-04-09 PROCEDURE — 2580000003 HC RX 258: Performed by: INTERNAL MEDICINE

## 2022-04-09 PROCEDURE — 36415 COLL VENOUS BLD VENIPUNCTURE: CPT

## 2022-04-09 PROCEDURE — 85025 COMPLETE CBC W/AUTO DIFF WBC: CPT

## 2022-04-09 PROCEDURE — 6360000002 HC RX W HCPCS: Performed by: INTERNAL MEDICINE

## 2022-04-09 PROCEDURE — 6370000000 HC RX 637 (ALT 250 FOR IP): Performed by: PHYSICAL MEDICINE & REHABILITATION

## 2022-04-09 PROCEDURE — 80069 RENAL FUNCTION PANEL: CPT

## 2022-04-09 PROCEDURE — 94761 N-INVAS EAR/PLS OXIMETRY MLT: CPT

## 2022-04-09 PROCEDURE — 82962 GLUCOSE BLOOD TEST: CPT

## 2022-04-09 PROCEDURE — 2500000003 HC RX 250 WO HCPCS: Performed by: PHYSICAL MEDICINE & REHABILITATION

## 2022-04-09 RX ORDER — GLIPIZIDE 5 MG/1
10 TABLET ORAL
Status: DISCONTINUED | OUTPATIENT
Start: 2022-04-09 | End: 2022-04-14

## 2022-04-09 RX ADMIN — SODIUM CHLORIDE, PRESERVATIVE FREE 10 ML: 5 INJECTION INTRAVENOUS at 20:36

## 2022-04-09 RX ADMIN — INSULIN LISPRO 3 UNITS: 100 INJECTION, SOLUTION INTRAVENOUS; SUBCUTANEOUS at 20:37

## 2022-04-09 RX ADMIN — SODIUM BICARBONATE 650 MG: 650 TABLET ORAL at 20:36

## 2022-04-09 RX ADMIN — MICONAZOLE NITRATE: 2 POWDER TOPICAL at 11:07

## 2022-04-09 RX ADMIN — HEPARIN SODIUM 5000 UNITS: 5000 INJECTION INTRAVENOUS; SUBCUTANEOUS at 20:36

## 2022-04-09 RX ADMIN — CARVEDILOL 3.12 MG: 6.25 TABLET, FILM COATED ORAL at 17:36

## 2022-04-09 RX ADMIN — ALLOPURINOL 100 MG: 100 TABLET ORAL at 08:37

## 2022-04-09 RX ADMIN — DOXYCYCLINE HYCLATE 100 MG: 100 TABLET, FILM COATED ORAL at 08:36

## 2022-04-09 RX ADMIN — ASPIRIN 81 MG 81 MG: 81 TABLET ORAL at 08:38

## 2022-04-09 RX ADMIN — SODIUM CHLORIDE, PRESERVATIVE FREE 10 ML: 5 INJECTION INTRAVENOUS at 08:37

## 2022-04-09 RX ADMIN — SODIUM BICARBONATE 650 MG: 650 TABLET ORAL at 08:36

## 2022-04-09 RX ADMIN — HEPARIN SODIUM 5000 UNITS: 5000 INJECTION INTRAVENOUS; SUBCUTANEOUS at 05:41

## 2022-04-09 RX ADMIN — HEPARIN SODIUM 5000 UNITS: 5000 INJECTION INTRAVENOUS; SUBCUTANEOUS at 14:11

## 2022-04-09 RX ADMIN — MICONAZOLE NITRATE: 2 POWDER TOPICAL at 20:37

## 2022-04-09 RX ADMIN — TRAMADOL HYDROCHLORIDE 25 MG: 50 TABLET, COATED ORAL at 05:40

## 2022-04-09 RX ADMIN — LEVOTHYROXINE SODIUM 50 MCG: 0.05 TABLET ORAL at 05:41

## 2022-04-09 RX ADMIN — FLUCONAZOLE 100 MG: 100 TABLET ORAL at 08:36

## 2022-04-09 RX ADMIN — GLIPIZIDE 10 MG: 5 TABLET ORAL at 17:36

## 2022-04-09 RX ADMIN — CARVEDILOL 3.12 MG: 6.25 TABLET, FILM COATED ORAL at 08:36

## 2022-04-09 RX ADMIN — ATORVASTATIN CALCIUM 40 MG: 40 TABLET, FILM COATED ORAL at 20:36

## 2022-04-09 RX ADMIN — PREGABALIN 50 MG: 50 CAPSULE ORAL at 08:38

## 2022-04-09 RX ADMIN — SODIUM BICARBONATE 650 MG: 650 TABLET ORAL at 14:11

## 2022-04-09 RX ADMIN — ALOGLIPTIN 6.25 MG: 6.25 TABLET, FILM COATED ORAL at 08:36

## 2022-04-09 RX ADMIN — PREGABALIN 50 MG: 50 CAPSULE ORAL at 20:36

## 2022-04-09 RX ADMIN — GLIPIZIDE 10 MG: 5 TABLET ORAL at 08:36

## 2022-04-09 ASSESSMENT — PAIN SCALES - GENERAL
PAINLEVEL_OUTOF10: 0
PAINLEVEL_OUTOF10: 0
PAINLEVEL_OUTOF10: 6
PAINLEVEL_OUTOF10: 0
PAINLEVEL_OUTOF10: 0

## 2022-04-09 ASSESSMENT — PAIN DESCRIPTION - PROGRESSION: CLINICAL_PROGRESSION: NOT CHANGED

## 2022-04-09 ASSESSMENT — PAIN DESCRIPTION - DESCRIPTORS: DESCRIPTORS: ACHING;DISCOMFORT

## 2022-04-09 ASSESSMENT — PAIN DESCRIPTION - PAIN TYPE
TYPE: ACUTE PAIN
TYPE: ACUTE PAIN

## 2022-04-09 ASSESSMENT — PAIN DESCRIPTION - ONSET: ONSET: ON-GOING

## 2022-04-09 ASSESSMENT — PAIN - FUNCTIONAL ASSESSMENT: PAIN_FUNCTIONAL_ASSESSMENT: PREVENTS OR INTERFERES SOME ACTIVE ACTIVITIES AND ADLS

## 2022-04-09 ASSESSMENT — PAIN DESCRIPTION - LOCATION: LOCATION: HIP

## 2022-04-09 ASSESSMENT — PAIN DESCRIPTION - FREQUENCY: FREQUENCY: INTERMITTENT

## 2022-04-09 ASSESSMENT — PAIN DESCRIPTION - ORIENTATION: ORIENTATION: LEFT

## 2022-04-09 NOTE — PROGRESS NOTES
RBCUA 64 04/06/2022    MUCUS RARE 04/06/2022    TRICHOMONAS NONE SEEN 04/06/2022    YEAST FEW 04/06/2022    BACTERIA NEGATIVE 04/06/2022    CLARITYU CLOUDY 04/06/2022    SPECGRAV 1.015 04/06/2022    UROBILINOGEN NORMAL 04/06/2022    BILIRUBINUR NEGATIVE 04/06/2022    BLOODU LARGE 04/06/2022    GLUCOSEU 1,000 03/23/2013    KETUA NEGATIVE 04/06/2022    AMORPHOUS RARE 11/16/2021     ABG:  No results found for: PHART, KCM6YPH, PO2ART, MFT5THV, BEART, THGBART, ZPS8OWZ, D6AEEMSR  HgBA1c:    Lab Results   Component Value Date    LABA1C 9.2 03/22/2022     Microalbumen/Creatinine ratio:  No components found for: RUCREAT  TSH:  No results found for: TSH  IRON:    Lab Results   Component Value Date    IRON 15 03/22/2022     Iron Saturation:  No components found for: PERCENTFE  TIBC:    Lab Results   Component Value Date    TIBC 214 03/22/2022     FERRITIN:    Lab Results   Component Value Date    FERRITIN 352 03/22/2022     RPR:  No results found for: RPR  MITALI:  No results found for: ANATITER, MITALI  24 Hour Urine for Creatinine Clearance:  No components found for: CREAT4, UHRS10, UTV10      Objective:   I/O: 04/08 0701 - 04/09 0700  In: 420 [P.O.:420]  Out: 1100 [Urine:1100]  I/O last 3 completed shifts: In: 5 [P.O.:420]  Out: 1900 [Urine:1900]  No intake/output data recorded. Vitals: /71   Pulse 85   Temp 98.4 °F (36.9 °C) (Oral)   Resp 16   Ht 5' 8\" (1.727 m)   Wt 198 lb 10.2 oz (90.1 kg)   SpO2 98%   BMI 30.20 kg/m²  {  General appearance: awake weak  HEENT: Head: Normal, normocephalic, atraumatic. Neck: supple, symmetrical, trachea midline  Lungs: diminished breath sounds bilaterally  Heart: S1, S2 normal  Abdomen: abnormal findings:  soft nt  Extremities: edema trace  Neurologic: Mental status: alertness: Weak tired        Assessment and Plan:      IMP:    1.   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

## 2022-04-09 NOTE — PROGRESS NOTES
Progress Note( Dr. Elma Manzanares)  4/9/2022  Subjective:   Admit Date: 3/30/2022  PCP: Mariela Marin MD    Admitted For : Patient was initially admitted to medical floor for sepsis from UTI and generalized  weakness    Consulted For: Better control of blood glucose    Interval History: Patient was transferred from medical floor on 3/30/2022 and was admitted to acute rehab unit for rehabilitation and physical therapy  Blood sugar control is better    Patient seem to be somewhat dull not bright this morning offers no specific complaints  Somewhat t seem to be confused    Denies any chest pains,   Denies SOB . Denies nausea or vomiting. No new bowel or bladder symptoms. Intake/Output Summary (Last 24 hours) at 4/9/2022 1219  Last data filed at 4/9/2022 0900  Gross per 24 hour   Intake 420 ml   Output 1100 ml   Net -680 ml       DATA    CBC:   Recent Labs     04/07/22  0615   WBC 9.6   HGB 9.5*       CMP:  No results for input(s): NA, K, CL, CO2, BUN, CREATININE, GLU, CALCIUM, PROT, LABALBU, BILITOT, ALKPHOS, AST, ALT in the last 72 hours.   Lipids: No results found for: CHOL, HDL, TRIG  Glucose:  Recent Labs     04/08/22  2020 04/09/22  0800 04/09/22  1130   POCGLU 192* 150* 181*     [id number]:  Lab Results   Component Value Date    LABA1C 9.2 03/22/2022     High Sensitivity TSH:   Lab Results   Component Value Date    TSHHS 6.190 03/25/2022     Free T3: No results found for: FT3  Free T4:  Lab Results   Component Value Date    T4FREE 1.15 03/27/2022       No orders to display        Scheduled Medicines   Medications:    glipiZIDE  10 mg Oral BID AC    fluconazole  100 mg Oral Daily    insulin lispro  0-9 Units SubCUTAneous Nightly    sodium bicarbonate  650 mg Oral TID    insulin lispro  0-18 Units SubCUTAneous TID WC    miconazole   Topical BID    [Held by provider] insulin glargine  10 Units SubCUTAneous Nightly    heparin (porcine)  5,000 Units SubCUTAneous 3 times per day    sodium chloride flush  5-40 mL IntraVENous 2 times per day    allopurinol  100 mg Oral Daily    alogliptin  6.25 mg Oral Daily    aspirin  81 mg Oral Daily    atorvastatin  40 mg Oral Nightly    carvedilol  3.125 mg Oral BID WC    levothyroxine  50 mcg Oral Daily    pregabalin  50 mg Oral BID      Infusions:    dextrose           Objective:   Vitals: /71   Pulse 85   Temp 98.4 °F (36.9 °C) (Oral)   Resp 16   Ht 5' 8\" (1.727 m)   Wt 198 lb 10.2 oz (90.1 kg)   SpO2 98%   BMI 30.20 kg/m²   General appearance: alert and cooperative with exam  Neck: no JVD or bruit  Thyroid : Normal lobes   Lungs: Has Vesicular Breath sounds   Heart:  regular rate and rhythm  Abdomen: soft, non-tender; bowel sounds normal; no masses,  no organomegaly has Surapubic Catheter   Musculoskeletal: Normal  Extremities: extremities normal, , no edema  Neurologic:  Awake, alert, oriented to name, place and time. Cranial nerves II-XII are grossly intact. Motor weakness . Sensory is intact. ,  and gait is abnormal.unstable     Assessment:     Patient Active Problem List:     Gait disturbance     Generalized weakness     Diabetes mellitus (HCC)     Osteoarthritis     Anemia of chronic disorder     Infected implanted bladder sphincter cuff     Escherichia coli urinary tract infection     Hypertension     Sepsis ([address]     Type 2 diabetes mellitus with hypoglycemia without coma ([address]     UTI (urinary tract infection) due to urinary indwelling catheter (HCC)     Hyperkalemia     Acute kidney failure (HCC)     Leg weakness, bilateral     Type 2 diabetes mellitus with neurological manifestations, uncontrolled ([address]     Complicated UTI (urinary tract infection)     Essential hypertension     Severe muscle deconditioning     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 (HCC)     Frequent falls     Chronic kidney disease, stage 3a ([address]     Uncontrolled type 2 diabetes mellitus with peripheral neuropathy ([address]     Prostate cancer ([address]rapubic catheter [address]     Sepsis secondary to UTI ([facility]      Plan:     1. Reviewed POC blood glucose . Labs and X ray results   2. Reviewed Current Medicines   3. On meal/ Correction bolus Humalog/ Basal Lantus Insulin. On hold for now regime / and   4. Monitor Blood glucose frequently   5. Modified  the dose of Insulin/ other medicines as needed  6. On low-dose Synthroid of 50 mg/day  7. Patient was transferred to acute rehab unit on 3/30//2022   8. Patient to be participating in physical therapy and Occupational Therapy of acute rehab unit  9. Will follow     .      Lexie Dash MD, MD

## 2022-04-09 NOTE — PROGRESS NOTES
Angel Velasquez    : 1940  Acct #: [de-identified]  MRN: [id number]              PM&R Progress Note      Admitting diagnosis: ***    Comorbid diagnoses impacting rehabilitation: ***    Chief complaint: ***    Prior (baseline) level of function: Independent.     Current level of function:         Current  [facility]F-[facility]STER and Goals:   Occupational Therapy:    Short term goals  Time Frame for Short term goals: STGs=LTGs :   Long term goals  Time Frame for Long term goals : 10-14 days or until d/c  Long term goal 1: Pt will complete grooming tasks Ind seated  Long term goal 2: Pt will complete total body bathing c S  Long term goal 3: Pt will complete UB dressing c setup  Long term goal 4: Pt will complete LB dressing c supervision using AE PRN  Long term goal 5: Pt will doff/don footwear c supervision using AE PRN  Long term goals 6: Pt will complete toileting c S  Long term goal 7: Pt will complete functional transfers (bed, chair, toilet, shower) c DME PRN and S  Long term goal 8: Pt will perform therex/therax to facilitate increased strength/endurance/ax tolerance (c emphasis on dynamic standing balance >8 mins, BUE endurance, cognitive retraining) c SBA :                                       Eating: Eating  Assistance Needed: Setup or clean-up assistance  Comment: assist to open packages/containers  CARE Score: 5  Discharge Goal: Independent       Oral Hygiene: Oral Hygiene  Assistance Needed: Supervision or touching assistance  Comment: seated at sink, cues for thoroughness  CARE Score: 4  Discharge Goal: Independent    UB/LB Bathing: Shower/Bathe Self  Assistance Needed: Partial/moderate assistance  Comment: Pt able to UB, abdomen, thighs and Bilateral feet with LHS; Pt required assist to bathe bottom  CARE Score: 3  Discharge Goal: Supervision or touching assistance    UB Dressing: Upper Body Dressing  Assistance Needed: Partial/moderate assistance  Comment: P able to doff shirt and thread BUEs; required assist to pull overhead  CARE Score: 3  Discharge Goal: Set-up or clean-up assistance         LB Dressing: Lower Body Dressing  Assistance Needed: Substantial/maximal assistance  Comment: Pt able to thread BLEs in brief using reacher and manage over hips c min A; D/t limited time Pt requird assist to thread BLEs into pants and perform pants management up  CARE Score: 2  Discharge Goal: Supervision or touching assistance    Donning and [place] Footwear: Putting On/Taking Off Footwear  Assistance Needed: Partial/moderate assistance  Comment: Min A to doff socks using reacher; Pt able to don socks using sock aid  CARE Score: 3  Discharge Goal: Supervision or touching assistance      Toileting: Toileting Hygiene  Assistance Needed: Substantial/maximal assistance  Comment: able to manage depends down, required assist for BM hygiene and pants management for balance  CARE Score: 2  Discharge Goal: Supervision or touching assistance      Toilet Transfers:   Toilet Transfer  Assistance Needed: Partial/moderate assistance  Comment: required Mod A to<>from WC c max cues to fully turn  CARE Score: 3  Discharge Goal: Supervision or touching assistance    Physical Therapy:         Long term goals  Time Frame for Long term goals : 12-14 days  Long term goal 1: Pt will perform bed mobility with mod I  Long term goal 2: Pt will perform sit to stand and pivot transfers with mod I, car transfers with CGA  Long term goal 3: Pt will ambulate 10' with 2ww with mod I, up to 150' with supervision including uneven surfaces  Long term goal 4: Pt will ascend/descend curb step with 2ww and up to 4 steps with B rails with supervision  Long term goal 5: Pt will  object from floor with 2ww and reacher with supervision      Bed Mobility:   Sit to Lying  Assistance Needed: Substantial/maximal assistance  Comment: max assist for B LE, improved control of trunk  CARE Score: 2  Discharge Goal: Independent  Roll Left and Right  Assistance Needed: Substantial/maximal assistance  Comment: max assist with rails  CARE Score: 2  Discharge Goal: Independent  Lying to Sitting on Side of Bed  Assistance Needed: Partial/moderate assistance  Comment: mod assist for trunk  CARE Score: 3  Discharge Goal: Independent    Transfers:    Sit to Stand  Assistance Needed: Partial/moderate assistance  Comment: CG to min assist from w/c variable  Reason if not Attempted: Not attempted due to medical condition or safety concerns  CARE Score: 3  Discharge Goal: Independent  Chair/Bed-to-Chair Transfer  Assistance Needed: Partial/moderate assistance  Comment: mod assist with 2ww and max time allowed  Reason if not Attempted: Not attempted due to medical condition or safety concerns  CARE Score: 3  Discharge Goal: Independent  Toilet Transfer  Assistance Needed: Substantial/maximal assistance  Comment: max assist and max verbal cues for techique, posture, safety  CARE Score: 2  Car Transfer  Assistance Needed: Partial/moderate assistance  Comment: mod assist for trunk getting out of car  Reason if not Attempted: Not attempted due to medical condition or safety concerns  CARE Score: 3  Discharge Goal: Supervision or touching assistance    Ambulation:    Walking Ability  Does the Patient Walk?: Yes     Walk 10 Feet  Assistance Needed: Partial/moderate assistance  Comment: min assist with 2ww and max time allowed  Reason if not Attempted: Not attempted due to medical condition or safety concerns  CARE Score: 3  Discharge Goal: Independent     Walk 50 Feet with Two Turns  Comment: Pt able to walk 80' once, but second time only 44'  Reason if not Attempted: Not attempted due to medical condition or safety concerns  CARE Score: 88  Discharge Goal: Supervision or touching assistance     Walk 150 Feet  Reason if not Attempted: Not attempted due to medical condition or safety concerns  CARE Score: 88  Discharge Goal: Supervision or touching assistance     Walking 10 Feet on Uneven Surfaces  Assistance Needed: Partial/moderate assistance  Comment: min assist with 2ww  Reason if not Attempted: Not attempted due to medical condition or safety concerns  CARE Score: 3  Discharge Goal: Supervision or touching assistance     1 Step (Curb)  Assistance Needed: Partial/moderate assistance  Comment: mod assist on 4\" step  Reason if not Attempted: Not attempted due to medical condition or safety concerns  CARE Score: 3  Discharge Goal: Supervision or touching assistance     4 Steps  Reason if not Attempted: Not attempted due to medical condition or safety concerns  CARE Score: 88  Discharge Goal: Supervision or touching assistance     12 Steps  Reason if not Attempted: Not applicable  CARE Score: 9  Discharge Goal: Not Applicable       Wheelchair:  w/c Ability: Wheelchair Ability  Uses a Wheelchair and/or Scooter?: No  Wheel 50 Feet with Two Turns  Assistance Needed: Partial/moderate assistance  Comment: 100' with min assist to keep forward momentum. CARE Score: 3  Wheel 150 Feet  Assistance Needed: Partial/moderate assistance  CARE Score: 3          Balance:        Object: Picking Up Object  Assistance Needed: Substantial/maximal assistance  Comment: started with mod assist as he took off his L hand(due to leaning to R), and able to grasp object, then as pt bringing it up, lost balance posterior and needed max assist to recover  Reason if not Attempted: Not attempted due to medical condition or safety concerns  CARE Score: 2  Discharge Goal: Supervision or touching assistance    I      Exam:    Blood pressure (!) 142/63, pulse 80, temperature 98.4 °F (36.9 °C), temperature source Oral, resp. rate 18, height 5' 8\" (1.727 m), weight 195 lb 5.2 oz (88.6 kg), SpO2 100 %. General: ***    HEENT: ***    Pulmonary: ***    Cardiac: ***    Abdomen: Patient's abdomen is soft and nondistended. Bowel sounds were present throughout. There was no rebound, guarding or masses noted.     Upper extremities: ***    Lower extremities: ***    Sitting balance was ***. Standing balance was ***.     Lab Results   Component Value Date    WBC 9.6 04/07/2022    HGB 9.5 (L) 04/07/2022    HCT 31.2 (L) 04/07/2022    MCV 96.6 04/07/2022     04/07/2022     Lab Results   Component Value Date    INR 1.20 03/17/2022    INR 0.99 05/19/2015    INR 1.31 03/23/2013    PROTIME 15.5 (H) 03/17/2022    PROTIME 11.3 05/19/2015    PROTIME 14.2 03/23/2013     Lab Results   Component Value Date    CREATININE 2.5 (H) 04/06/2022    BUN 64 (H) 04/06/2022     04/06/2022    K 4.9 04/06/2022     04/06/2022    CO2 19 (L) 04/06/2022     Lab Results   Component Value Date    ALT 12 04/04/2022    AST 14 (L) 04/04/2022    ALKPHOS 108 04/04/2022    BILITOT 0.2 04/04/2022       Expected length of stay  prior to a supervised level of function for discharge home with a walker and [facility]HC OT/PT is ***    Recommendations:    ***

## 2022-04-09 NOTE — PLAN OF CARE
Problem: Daily Care:  Goal: Daily care needs are met  Description: Daily care needs are met  Outcome: Ongoing     Problem: Pain:  Goal: Pain level will decrease  Description: Pain level will decrease  4/9/2022 1159 by David Kearns RN  Outcome: Ongoing  4/9/2022 1152 by David Kearns RN  Outcome: Ongoing     Problem: Skin Integrity:  Goal: Skin integrity will stabilize  Description: Skin integrity will stabilize  Outcome: Ongoing

## 2022-04-09 NOTE — PLAN OF CARE
Problem: Daily Care:  Goal: Daily care needs are met  Description: Daily care needs are met  Outcome: Ongoing     Problem: Pain:  Goal: Pain level will decrease  Description: Pain level will decrease  Outcome: Ongoing

## 2022-04-10 ENCOUNTER — APPOINTMENT (OUTPATIENT)
Dept: GENERAL RADIOLOGY | Age: 82
DRG: 947 | End: 2022-04-10
Attending: PHYSICAL MEDICINE & REHABILITATION
Payer: MEDICARE

## 2022-04-10 LAB
GLUCOSE BLD-MCNC: 106 MG/DL (ref 70–99)
GLUCOSE BLD-MCNC: 171 MG/DL (ref 70–99)
GLUCOSE BLD-MCNC: 231 MG/DL (ref 70–99)
GLUCOSE BLD-MCNC: 277 MG/DL (ref 70–99)

## 2022-04-10 PROCEDURE — 6360000002 HC RX W HCPCS: Performed by: INTERNAL MEDICINE

## 2022-04-10 PROCEDURE — 6370000000 HC RX 637 (ALT 250 FOR IP): Performed by: INTERNAL MEDICINE

## 2022-04-10 PROCEDURE — 6370000000 HC RX 637 (ALT 250 FOR IP): Performed by: PHYSICAL MEDICINE & REHABILITATION

## 2022-04-10 PROCEDURE — 2500000003 HC RX 250 WO HCPCS: Performed by: PHYSICAL MEDICINE & REHABILITATION

## 2022-04-10 PROCEDURE — 94150 VITAL CAPACITY TEST: CPT

## 2022-04-10 PROCEDURE — 71045 X-RAY EXAM CHEST 1 VIEW: CPT

## 2022-04-10 PROCEDURE — 82962 GLUCOSE BLOOD TEST: CPT

## 2022-04-10 PROCEDURE — 2580000003 HC RX 258: Performed by: INTERNAL MEDICINE

## 2022-04-10 PROCEDURE — 94761 N-INVAS EAR/PLS OXIMETRY MLT: CPT

## 2022-04-10 PROCEDURE — 1280000000 HC REHAB R&B

## 2022-04-10 RX ORDER — SODIUM CHLORIDE 9 MG/ML
INJECTION, SOLUTION INTRAVENOUS CONTINUOUS
Status: DISPENSED | OUTPATIENT
Start: 2022-04-10 | End: 2022-04-10

## 2022-04-10 RX ORDER — INSULIN GLARGINE 100 [IU]/ML
10 INJECTION, SOLUTION SUBCUTANEOUS EVERY MORNING
Status: DISCONTINUED | OUTPATIENT
Start: 2022-04-10 | End: 2022-04-14 | Stop reason: HOSPADM

## 2022-04-10 RX ADMIN — CARVEDILOL 3.12 MG: 6.25 TABLET, FILM COATED ORAL at 09:41

## 2022-04-10 RX ADMIN — SODIUM CHLORIDE, PRESERVATIVE FREE 10 ML: 5 INJECTION INTRAVENOUS at 07:42

## 2022-04-10 RX ADMIN — LEVOTHYROXINE SODIUM 50 MCG: 0.05 TABLET ORAL at 05:56

## 2022-04-10 RX ADMIN — ATORVASTATIN CALCIUM 40 MG: 40 TABLET, FILM COATED ORAL at 19:21

## 2022-04-10 RX ADMIN — SODIUM CHLORIDE, PRESERVATIVE FREE 10 ML: 5 INJECTION INTRAVENOUS at 19:24

## 2022-04-10 RX ADMIN — ACETAMINOPHEN 650 MG: 325 TABLET ORAL at 17:36

## 2022-04-10 RX ADMIN — SODIUM BICARBONATE 650 MG: 650 TABLET ORAL at 19:21

## 2022-04-10 RX ADMIN — CARVEDILOL 3.12 MG: 6.25 TABLET, FILM COATED ORAL at 17:33

## 2022-04-10 RX ADMIN — INSULIN GLARGINE 10 UNITS: 100 INJECTION, SOLUTION SUBCUTANEOUS at 09:29

## 2022-04-10 RX ADMIN — SODIUM BICARBONATE 650 MG: 650 TABLET ORAL at 14:31

## 2022-04-10 RX ADMIN — HEPARIN SODIUM 5000 UNITS: 5000 INJECTION INTRAVENOUS; SUBCUTANEOUS at 19:31

## 2022-04-10 RX ADMIN — INSULIN LISPRO 5 UNITS: 100 INJECTION, SOLUTION INTRAVENOUS; SUBCUTANEOUS at 20:45

## 2022-04-10 RX ADMIN — MICONAZOLE NITRATE: 2 POWDER TOPICAL at 09:31

## 2022-04-10 RX ADMIN — ALOGLIPTIN 6.25 MG: 6.25 TABLET, FILM COATED ORAL at 09:28

## 2022-04-10 RX ADMIN — FLUCONAZOLE 100 MG: 100 TABLET ORAL at 09:28

## 2022-04-10 RX ADMIN — PREGABALIN 50 MG: 50 CAPSULE ORAL at 09:28

## 2022-04-10 RX ADMIN — GLIPIZIDE 10 MG: 5 TABLET ORAL at 09:28

## 2022-04-10 RX ADMIN — ASPIRIN 81 MG 81 MG: 81 TABLET ORAL at 09:28

## 2022-04-10 RX ADMIN — SODIUM CHLORIDE: 9 INJECTION, SOLUTION INTRAVENOUS at 07:43

## 2022-04-10 RX ADMIN — PREGABALIN 50 MG: 50 CAPSULE ORAL at 19:21

## 2022-04-10 RX ADMIN — HEPARIN SODIUM 5000 UNITS: 5000 INJECTION INTRAVENOUS; SUBCUTANEOUS at 14:30

## 2022-04-10 RX ADMIN — MICONAZOLE NITRATE: 2 POWDER TOPICAL at 19:22

## 2022-04-10 RX ADMIN — GLIPIZIDE 10 MG: 5 TABLET ORAL at 17:33

## 2022-04-10 RX ADMIN — ALLOPURINOL 100 MG: 100 TABLET ORAL at 09:28

## 2022-04-10 RX ADMIN — HEPARIN SODIUM 5000 UNITS: 5000 INJECTION INTRAVENOUS; SUBCUTANEOUS at 05:56

## 2022-04-10 RX ADMIN — SODIUM BICARBONATE 650 MG: 650 TABLET ORAL at 09:28

## 2022-04-10 ASSESSMENT — PAIN SCALES - GENERAL
PAINLEVEL_OUTOF10: 0
PAINLEVEL_OUTOF10: 4
PAINLEVEL_OUTOF10: 0

## 2022-04-10 NOTE — PROGRESS NOTES
Nephrology Progress Note  4/10/2022 9:24 AM  Subjective: Interval History: Veena Booker is a 78 y.o. male who is more awake today resting in bed getting some IV fluids  Data:   Scheduled Meds:   insulin glargine  10 Units SubCUTAneous QAM    glipiZIDE  10 mg Oral BID AC    fluconazole  100 mg Oral Daily    insulin lispro  0-9 Units SubCUTAneous Nightly    sodium bicarbonate  650 mg Oral TID    insulin lispro  0-18 Units SubCUTAneous TID WC    miconazole   Topical BID    heparin (porcine)  5,000 Units SubCUTAneous 3 times per day    sodium chloride flush  5-40 mL IntraVENous 2 times per day    allopurinol  100 mg Oral Daily    alogliptin  6.25 mg Oral Daily    aspirin  81 mg Oral Daily    atorvastatin  40 mg Oral Nightly    carvedilol  3.125 mg Oral BID WC    levothyroxine  50 mcg Oral Daily    pregabalin  50 mg Oral BID     Continuous Infusions:   sodium chloride 100 mL/hr at 04/10/22 0743    dextrose           CBC   Recent Labs     04/09/22  2251   WBC 10.2   HGB 9.5*   HCT 31.4*         BMP   Recent Labs     04/09/22  2251      K 4.8      CO2 22   PHOS 6.3*   BUN 84*   CREATININE 2.7*     Hepatic:   No results for input(s): AST, ALT, ALB, BILITOT, ALKPHOS in the last 72 hours. Troponin: No results for input(s): TROPONINI in the last 72 hours. BNP: No results for input(s): BNP in the last 72 hours. Lipids: No results for input(s): CHOL, HDL in the last 72 hours. Invalid input(s): LDLCALCU  ABGs: No results found for: PHART, PO2ART, IFE8VKI  INR: No results for input(s): INR in the last 72 hours.   Renal Labs  Albumin:    Lab Results   Component Value Date    LABALBU 3.6 04/09/2022     Calcium:    Lab Results   Component Value Date    CALCIUM 9.5 04/09/2022     Phosphorus:    Lab Results   Component Value Date    PHOS 6.3 04/09/2022     U/A:    Lab Results   Component Value Date    NITRU NEGATIVE 04/06/2022    NITRU NEGATIVE 03/23/2013    COLORU COLORLESS 04/06/2022 WBCUA 544 04/06/2022    RBCUA 64 04/06/2022    MUCUS RARE 04/06/2022    TRICHOMONAS NONE SEEN 04/06/2022    YEAST FEW 04/06/2022    BACTERIA NEGATIVE 04/06/2022    CLARITYU CLOUDY 04/06/2022    SPECGRAV 1.015 04/06/2022    UROBILINOGEN NORMAL 04/06/2022    BILIRUBINUR NEGATIVE 04/06/2022    BLOODU LARGE 04/06/2022    GLUCOSEU 1,000 03/23/2013    KETUA NEGATIVE 04/06/2022    AMORPHOUS RARE 11/16/2021     ABG:  No results found for: PHART, EXX4EQR, PO2ART, YKC5NMP, BEART, THGBART, XHX3ULQ, N1AVRPWN  HgBA1c:    Lab Results   Component Value Date    LABA1C 9.2 03/22/2022     Microalbumen/Creatinine ratio:  No components found for: RUCREAT  TSH:  No results found for: TSH  IRON:    Lab Results   Component Value Date    IRON 15 03/22/2022     Iron Saturation:  No components found for: PERCENTFE  TIBC:    Lab Results   Component Value Date    TIBC 214 03/22/2022     FERRITIN:    Lab Results   Component Value Date    FERRITIN 352 03/22/2022     RPR:  No results found for: RPR  MITALI:  No results found for: ANATITER, MITALI  24 Hour Urine for Creatinine Clearance:  No components found for: CREAT4, UHRS10, UTV10      Objective:   I/O: 04/09 0701 - 04/10 0700  In: 520 [P.O.:520]  Out: 1600 [Urine:1600]  I/O last 3 completed shifts: In: 520 [P.O.:520]  Out: 2700 [Urine:2700]  No intake/output data recorded. Vitals: /62   Pulse 83   Temp 98.2 °F (36.8 °C) (Oral)   Resp 16   Ht 5' 8\" (1.727 m)   Wt 202 lb 2.6 oz (91.7 kg)   SpO2 98%   BMI 30.74 kg/m²  {  General appearance: awake weak  HEENT: Head: Normal, normocephalic, atraumatic. Neck: supple, symmetrical, trachea midline  Lungs: diminished breath sounds bilaterally  Heart: S1, S2 normal  Abdomen: abnormal findings:  soft nt  Extremities: edema trace  Neurologic: Mental status: alertness: Weak tired more awake today        Assessment and Plan:      IMP:    1.   Acute renal failure from ATN on CKD 3/4   #2 hypertension   #3 type 2 diabetes   #4 hyperkalemia    Plan #1 renal function slightly worsening creatinine 2.7 we will give 100 cc/h of IV fluids for 1 L  #2 blood pressure overall holding stable  #3 monitor glucose control  #4 potassium stable  Overall improving in rehab we will follow         Maxime Apodaca MD, MD

## 2022-04-10 NOTE — PLAN OF CARE
Problem: Pain:  Goal: Patient's pain/discomfort is manageable  Description: Patient's pain/discomfort is manageable  Outcome: Ongoing     Problem: Mobility - Impaired:  Goal: Mobility will improve  Description: Mobility will improve  Outcome: Ongoing

## 2022-04-11 LAB
GLUCOSE BLD-MCNC: 117 MG/DL (ref 70–99)
GLUCOSE BLD-MCNC: 194 MG/DL (ref 70–99)
GLUCOSE BLD-MCNC: 194 MG/DL (ref 70–99)
GLUCOSE BLD-MCNC: 214 MG/DL (ref 70–99)

## 2022-04-11 PROCEDURE — 6370000000 HC RX 637 (ALT 250 FOR IP): Performed by: INTERNAL MEDICINE

## 2022-04-11 PROCEDURE — 6360000002 HC RX W HCPCS: Performed by: INTERNAL MEDICINE

## 2022-04-11 PROCEDURE — 97150 GROUP THERAPEUTIC PROCEDURES: CPT

## 2022-04-11 PROCEDURE — 1280000000 HC REHAB R&B

## 2022-04-11 PROCEDURE — 97530 THERAPEUTIC ACTIVITIES: CPT

## 2022-04-11 PROCEDURE — 97542 WHEELCHAIR MNGMENT TRAINING: CPT

## 2022-04-11 PROCEDURE — 80048 BASIC METABOLIC PNL TOTAL CA: CPT

## 2022-04-11 PROCEDURE — 97116 GAIT TRAINING THERAPY: CPT

## 2022-04-11 PROCEDURE — 2500000003 HC RX 250 WO HCPCS: Performed by: PHYSICAL MEDICINE & REHABILITATION

## 2022-04-11 PROCEDURE — 97535 SELF CARE MNGMENT TRAINING: CPT

## 2022-04-11 PROCEDURE — 2580000003 HC RX 258: Performed by: INTERNAL MEDICINE

## 2022-04-11 PROCEDURE — 6370000000 HC RX 637 (ALT 250 FOR IP): Performed by: PHYSICAL MEDICINE & REHABILITATION

## 2022-04-11 PROCEDURE — 82962 GLUCOSE BLOOD TEST: CPT

## 2022-04-11 RX ADMIN — LEVOTHYROXINE SODIUM 50 MCG: 0.05 TABLET ORAL at 05:41

## 2022-04-11 RX ADMIN — INSULIN GLARGINE 10 UNITS: 100 INJECTION, SOLUTION SUBCUTANEOUS at 08:37

## 2022-04-11 RX ADMIN — HEPARIN SODIUM 5000 UNITS: 5000 INJECTION INTRAVENOUS; SUBCUTANEOUS at 05:41

## 2022-04-11 RX ADMIN — TRAMADOL HYDROCHLORIDE 25 MG: 50 TABLET, COATED ORAL at 09:50

## 2022-04-11 RX ADMIN — INSULIN LISPRO 2 UNITS: 100 INJECTION, SOLUTION INTRAVENOUS; SUBCUTANEOUS at 20:52

## 2022-04-11 RX ADMIN — FLUCONAZOLE 100 MG: 100 TABLET ORAL at 08:22

## 2022-04-11 RX ADMIN — PREGABALIN 50 MG: 50 CAPSULE ORAL at 20:50

## 2022-04-11 RX ADMIN — CARVEDILOL 3.12 MG: 6.25 TABLET, FILM COATED ORAL at 17:17

## 2022-04-11 RX ADMIN — CARVEDILOL 3.12 MG: 6.25 TABLET, FILM COATED ORAL at 08:21

## 2022-04-11 RX ADMIN — SODIUM BICARBONATE 650 MG: 650 TABLET ORAL at 14:34

## 2022-04-11 RX ADMIN — SODIUM CHLORIDE, PRESERVATIVE FREE 10 ML: 5 INJECTION INTRAVENOUS at 20:52

## 2022-04-11 RX ADMIN — HEPARIN SODIUM 5000 UNITS: 5000 INJECTION INTRAVENOUS; SUBCUTANEOUS at 14:34

## 2022-04-11 RX ADMIN — MICONAZOLE NITRATE: 2 POWDER TOPICAL at 20:51

## 2022-04-11 RX ADMIN — TRAMADOL HYDROCHLORIDE 25 MG: 50 TABLET, COATED ORAL at 03:26

## 2022-04-11 RX ADMIN — MICONAZOLE NITRATE: 2 POWDER TOPICAL at 08:25

## 2022-04-11 RX ADMIN — ATORVASTATIN CALCIUM 40 MG: 40 TABLET, FILM COATED ORAL at 20:50

## 2022-04-11 RX ADMIN — SODIUM CHLORIDE, PRESERVATIVE FREE 5 ML: 5 INJECTION INTRAVENOUS at 08:24

## 2022-04-11 RX ADMIN — ALLOPURINOL 100 MG: 100 TABLET ORAL at 08:21

## 2022-04-11 RX ADMIN — HEPARIN SODIUM 5000 UNITS: 5000 INJECTION INTRAVENOUS; SUBCUTANEOUS at 20:50

## 2022-04-11 RX ADMIN — ALOGLIPTIN 6.25 MG: 6.25 TABLET, FILM COATED ORAL at 08:22

## 2022-04-11 RX ADMIN — ASPIRIN 81 MG 81 MG: 81 TABLET ORAL at 08:23

## 2022-04-11 RX ADMIN — ACETAMINOPHEN 650 MG: 325 TABLET ORAL at 20:50

## 2022-04-11 RX ADMIN — SODIUM BICARBONATE 650 MG: 650 TABLET ORAL at 08:21

## 2022-04-11 RX ADMIN — PREGABALIN 50 MG: 50 CAPSULE ORAL at 08:23

## 2022-04-11 RX ADMIN — ACETAMINOPHEN 650 MG: 325 TABLET ORAL at 08:19

## 2022-04-11 RX ADMIN — SODIUM BICARBONATE 650 MG: 650 TABLET ORAL at 20:50

## 2022-04-11 ASSESSMENT — PAIN DESCRIPTION - DESCRIPTORS: DESCRIPTORS: DISCOMFORT

## 2022-04-11 ASSESSMENT — PAIN DESCRIPTION - PROGRESSION: CLINICAL_PROGRESSION: GRADUALLY IMPROVING

## 2022-04-11 ASSESSMENT — PAIN DESCRIPTION - ONSET: ONSET: ON-GOING

## 2022-04-11 ASSESSMENT — PAIN DESCRIPTION - PAIN TYPE: TYPE: ACUTE PAIN

## 2022-04-11 ASSESSMENT — PAIN SCALES - GENERAL
PAINLEVEL_OUTOF10: 0
PAINLEVEL_OUTOF10: 6
PAINLEVEL_OUTOF10: 4
PAINLEVEL_OUTOF10: 6
PAINLEVEL_OUTOF10: 8

## 2022-04-11 ASSESSMENT — PAIN DESCRIPTION - ORIENTATION: ORIENTATION: LEFT

## 2022-04-11 ASSESSMENT — PAIN DESCRIPTION - FREQUENCY: FREQUENCY: INTERMITTENT

## 2022-04-11 ASSESSMENT — PAIN - FUNCTIONAL ASSESSMENT: PAIN_FUNCTIONAL_ASSESSMENT: PREVENTS OR INTERFERES WITH MANY ACTIVE NOT PASSIVE ACTIVITIES

## 2022-04-11 ASSESSMENT — PAIN DESCRIPTION - LOCATION: LOCATION: HIP

## 2022-04-11 NOTE — PATIENT CARE CONFERENCE
ACUTE REHAB TEAM CONFERENCE SUMMARY   [facility]    NAME: Dav Stone  : 1940 ADMIT DATE: 3/30/2022    Rehab Admitting Dx: Metabolic encephalopathy [J04.66]  Metabolic encephalopathy [P08.02]  Patient Comorbid Conditions: Active Hospital Problems    Diagnosis Date Noted    History of prostate cancer [Z85.46]     Cigarette nicotine dependence without complication [Q36.718]     Metabolic encephalopathy [R06.54] 2022    Chronic kidney disease, stage IV (severe) ([facility] [address] [N18.4] 2022    Sepsis due to urinary tract infection ([facility] [address] [A41.9, N39.0] 2022     Date: 2022    CASE MANAGEMENT  Current issues/needs regarding patient and family discharge status: Patient lives with son Twila Canseco (\"Home\") in a 1L, ramped home. There is no current DME recommendation as patient has all needed. Current [facility] recommendation is for PT, OT, and Nursing. Plan is to discharge home with son.     PHYSICAL THERAPY (Updated in QI)        Long term goals  Time Frame for Long term goals : 12-14 days  Long term goal 1: Pt will perform bed mobility with mod I  Long term goal 2: Pt will perform sit to stand and pivot transfers with mod I, car transfers with CGA  Long term goal 3: Pt will ambulate 10' with 2ww with mod I, up to 150' with supervision including uneven surfaces  Long term goal 4: Pt will ascend/descend curb step with 2ww and up to 4 steps with B rails with supervision  Long term goal 5: Pt will  object from floor with 2ww and reacher with supervision    Impairments/deficits, barriers: lethargy, BP drops in standing, safety/cognition   Body structures, Functions, Activity limitations: Decreased functional mobility ,Decreased strength,Decreased endurance,Decreased posture,Decreased safe awareness,Decreased sensation,Decreased high-level IADLs,Decreased ADL status,Decreased cognition,Decreased balance,Increased pain     Prognosis: Guarded,Fair (pending improved level of alertness and cognition as well as pain mgmt)  Decision Making: High Complexity  Clinical Presentation: unpredictable characteristics  Equipment needed at discharge: 2ww, w/c      PT IRF-LESTER scores since initial assessment  Bed Mobility:   Sit to Lying  Assistance Needed: Substantial/maximal assistance  Comment: max assist for B LE, improved control of trunk  CARE Score: 2  Discharge Goal: Independent    Roll Left and Right  Assistance Needed: Substantial/maximal assistance  Comment: max assist with rails  CARE Score: 2  Discharge Goal: Independent    Lying to Sitting on Side of Bed  Assistance Needed: Partial/moderate assistance  Comment: min-mod A with bed features, max time and cues;  Req A /cues for catheter management/awareness prior to transferring opposite direction OOB  CARE Score: 3  Discharge Goal: Independent    Transfers:    Sit to Stand  Assistance Needed: Partial/moderate assistance  Comment: Min -CGA, initially retro req cues for COG fwd, A with catheter managment onto RW prior to standing  CARE Score: 3  Discharge Goal: Independent    Chair/Bed-to-Chair Transfer  Assistance Needed: Supervision or touching assistance  Comment: CGA for balance using RW  CARE Score: 4  Discharge Goal: Independent        Car Transfer  Assistance Needed: Partial/moderate assistance  Comment: mod assist for trunk getting out of car  CARE Score: 3  Discharge Goal: Supervision or touching assistance    Ambulation:    Walking Ability  Does the Patient Walk?: Yes     Walk 10 Feet  Assistance Needed: Supervision or touching assistance  Comment: CGA with RW, flexed posture  CARE Score: 4  Discharge Goal: Independent     Walk 50 Feet with Two Turns  Comment: 27' max distance due to orthostatic hypotension episode, pt symptomatic  CARE Score: 88  Discharge Goal: Supervision or touching assistance     Walk 150 Feet  Reason if not Attempted: Not attempted due to medical condition or safety concerns  CARE Score: 88  Discharge Goal: Supervision or touching assistance     Walking 10 Feet on Uneven Surfaces  Assistance Needed: Partial/moderate assistance  Comment: min assist with 2ww  CARE Score: 3  Discharge Goal: Supervision or touching assistance     1 Step (Curb)  Assistance Needed: Partial/moderate assistance  Comment: mod assist on 4\" step  CARE Score: 3  Discharge Goal: Supervision or touching assistance     4 Steps  Reason if not Attempted: Not attempted due to medical condition or safety concerns  CARE Score: 88  Discharge Goal: Supervision or touching assistance     12 Steps  Reason if not Attempted: Not applicable  CARE Score: 9  Discharge Goal: Not Applicable           Wheelchair:  w/c Ability: Wheelchair Ability  Uses a Wheelchair and/or Scooter?: No    Wheel 50 Feet with Two Turns  Assistance Needed: Partial/moderate assistance  Comment: 68' max distance with min A to SBA due for obstacle clearnce  CARE Score: 3    Wheel 150 Feet  Assistance Needed: Partial/moderate assistance  Comment: 68' max distance  CARE Score: 3              Balance:        Object: Picking Up Object  Assistance Needed: Substantial/maximal assistance  Comment: started with mod assist as he took off his L hand(due to leaning to R), and able to grasp object, then as pt bringing it up, lost balance posterior and needed max assist to recover  CARE Score: 2  Discharge Goal: Supervision or touching assistance    Fall Risk: [x]  Yes  []  No    OCCUPATIONAL THERAPY  (Updated in QI)  Short term goals  Time Frame for Short term goals: STGs=LTGs :   Long term goals  Time Frame for Long term goals : 10-14 days or until d/c  Long term goal 1: Pt will complete grooming tasks Ind seated  Long term goal 2: Pt will complete total body bathing c S  Long term goal 3: Pt will complete UB dressing c setup  Long term goal 4: Pt will complete LB dressing c supervision using AE PRN  Long term goal 5: Pt will doff/don footwear c supervision using AE PRN  Long term goals 6: Pt will complete toileting c S  Long term goal 7: Pt will complete functional transfers (bed, chair, toilet, shower) c DME PRN and S  Long term goal 8: Pt will perform therex/therax to facilitate increased strength/endurance/ax tolerance (c emphasis on dynamic standing balance >8 mins, BUE endurance, cognitive retraining) c SBA :                                       OT IRF-LESTER scores and goals since initial assessment:    ADLs:    Eating: Eating  Assistance Needed: Setup or clean-up assistance  Comment: assist to open packages/containers  CARE Score: 5  Discharge Goal: Independent       Oral Hygiene: Oral Hygiene  Assistance Needed: Supervision or touching assistance  Comment: seated at sink, cues for thoroughness  CARE Score: 4  Discharge Goal: Independent    UB/LB Bathing: Shower/Bathe Self  Assistance Needed: Supervision or touching assistance  Comment: Pt able to bathe UEs, chest, abdomen, thighs, perineal area and feet; CGA in stance to bathe bottom  CARE Score: 4  Discharge Goal: Supervision or touching assistance    UB Dressing: Upper Body Dressing  Assistance Needed: Supervision or touching assistance  Comment: pt able to doff/don T shirt  CARE Score: 4  Discharge Goal: Set-up or clean-up assistance         LB Dressing: Lower Body Dressing  Assistance Needed: Partial/moderate assistance  Comment: Pt able to thread mccrary bag and  BLEs into pants c increased time and verbal cues; min A to manage over hips  CARE Score: 3  Discharge Goal: Supervision or touching assistance    Donning and [place] Footwear: Putting On/Taking Off Footwear  Assistance Needed: Supervision or touching assistance  Comment: Pt able to doff socks using reacher; pt able to don socks with increased time  CARE Score: 4  Discharge Goal: Supervision or touching assistance      Toileting:  Toileting Hygiene  Assistance Needed: Substantial/maximal assistance  Comment: able to manage depends down, required assist for BM hygiene and pants management for balance  CARE Score: 2  Discharge Goal: Supervision or touching assistance      Toilet Transfers: Toilet Transfer  Assistance Needed: Partial/moderate assistance  Comment: required Mod A to<>from WC c max cues to fully turn  CARE Score: 3  Discharge Goal: Supervision or touching assistance      Impairments/deficits, barriers:  Decreased balance, endurance, ax tolerance, cognition  Assessment  Performance deficits / Impairments: Decreased functional mobility ,Decreased ADL status,Decreased ROM,Decreased strength,Decreased cognition,Decreased endurance,Decreased balance,Decreased sensation,Decreased high-level IADLs,Decreased posture  Decision Making: High Complexity  REQUIRES OT FOLLOW UP: Yes  Equipment needed at discharge: N/A      COGNITIVE FUNCTION/SPEECH THERAPY (AS INDICATED)  LTG         Duration/Frequency of Treatment  Duration/Frequency of Treatment: 1x/week x 1 week  30 mins min                             Nursing Current Medical Status:   [x] Is continent of bowel and bladder  / suprapubic catheter   [] Is incontinent of bowel and bladder    [x] Has had an adequate number of bowel movements   [] Urinates with no urinary retention >300ml in bladder   [] Targeting bladder protocol with mccrary removal   [x] Maintaining O2 SATs at 92% or greater   [x] Has pain managed while on ARU         [x] Has had no skin breakdown while on ARU   [] Has improved skin integrity via wound measurements   [x] Has no signs/symptoms of infection at the wound site   [] Pressure wounds Stage/Location:    [] Arrived on unit with pressure wound  [x] Has been free from injury during hospitalization   [] Has experienced a fall during hospitalization  [] Ongoing education with patient/family with understanding demonstrated for:  [] Receives IV Fluids  [] Other:        NUTRITION  Weight: 215 lb 6.2 oz (97.7 kg) / Body mass index is 32.75 kg/m². Current diet: ADULT DIET; Regular; 5 carb choices (75 gm/meal);  Low Potassium (Less than 3000 mg/day)  ADULT ORAL NUTRITION SUPPLEMENT; Lunch; Diabetic Oral Supplement  Intake: Meal intake 50-75%, trial oral nutrition supplement    Medical improvements/barriers: Orthostatic BP fluctuates, continues with gradual improvement, extra time needed for all tasks, cognition continues to improve,         Team goals for next treatment period/Intervention for current barriers:   [x] Pt will increase activity tolerance for daily tasks. [x] Pt will improve bed mobility with reduced assist.  [x] Pt will improve safety in fx tasks with reduced cues/assist  [x] Pt will improve transfers with reduced assist  [x] Pt will improve toileting with reduced assist  [x] Pt will improve ADL's with use of adaptive equipment with reduced assist  [] Pt will improve pain mgmt for maximum participation in tx program  [] Pt will improve communication to get basic needs met on unit  [] Pt will improve swallowing for safe diet advancement with use of strategies  []  Plan for discharge to home. Patient Strengths: Motivated, Cooperative and Pleasant    Justification for Continued Stay  Based on my medical assessment of the patient and review of information from the interdisciplinary team as part of this weekly team conference, the patient continues to meet the following criteria for IRF level of care:   The patient requires active and ongoing intervention of multiple therapy disciplines   The patient requires and intensive rehabilitation therapy program   The patient requires continued physician supervision by a rehabilitation physician   The patient requires 24 hours rehab nursing care   The patient requires an intensive and coordinated interdisciplinary team approach to the delivery of rehabilitative care.      Assessment/Plan   [x]  The patient is making good progression towards their long term goals and is actively participating in and has a reasonable expectation to continue to benefit from the intensive rehabilitation

## 2022-04-11 NOTE — PROGRESS NOTES
Occupational Therapy    Physical Rehabilitation: OCCUPATIONAL THERAPY     [x] daily progress note       [] discharge       Patient Name:  Joslyn Wheeler   :  1940 MRN: [id number]  Room:  [address] [place] [address] Date of Admission: 3/30/2022  Rehabilitation Diagnosis:   Metabolic encephalopathy [C74.52]  Metabolic encephalopathy [B17.72]       Date 2022       Day of ARU Week:  6   Time IN/OUT 1801-5992   Individual Tx Minutes 65   Group Tx Minutes    Co-Treat Minutes    Concurrent Tx Minutes    TOTAL Tx Time Mins 65   Variance Time +5   Variance Time []   Refusal due to:     []   Medical hold/reason:    []   Illness   []   Off Unit for test/procedure  [x]   Extra time needed to complete task  []   Therapeutic need  []   Other (specify):   Restrictions Restrictions/Precautions: General Precautions,Fall Risk,Contact Precautions (suprapubic catheter)         Communication with other providers: [x]   OK to see per nursing:     []   Spoke with team member regarding:      Subjective observations and cognitive status: Pt sitting in chair finishing lunch on approach; pleasant and agreeable to therapy session. During session, pt reported feeling dizzy. BP taken- 143/64. Pt required increased rest time during ADL. Pain level/location:    /10       Location:    Discharge recommendations  Anticipated discharge date:   Destination: []??home alone   []? ?home alone w assist prn   []? ? home w/ family    [x]? ? Continuous supervision       []? ?SNF    []? ? Assisted living     []? ? Other:   Continued therapy: []??C OT  []??OUTPATIENT  OT   []?? No Further OT  Equipment needs: none        ADLs:       UB/LB Bathing: Shower/Bathe Self  Assistance Needed: Supervision or touching assistance  Comment: Pt able to bathe UEs, chest, abdomen, thighs, perineal area and feet; CGA in stance to bathe bottom  CARE Score: 4  Discharge Goal: Supervision or touching assistance    UB Dressing: Upper Body Dressing  Assistance Needed: Supervision or touching assistance  Comment: Pt able to doff shirt; able to thread BUEs and pull overhead  CARE Score: 4  Discharge Goal: Set-up or clean-up assistance         LB Dressing: Lower Body Dressing  Assistance Needed: Substantial/maximal assistance  Comment: d/t limited time pt required max A to thread BLEs and mccrary bag into brief and pants; min A to mange over hips  CARE Score: 2  Discharge Goal: Supervision or touching assistance    Donning and [place] Footwear: Putting On/Taking Off Footwear  Assistance Needed: Partial/moderate assistance  Comment: pt able to doff socks with reacher; d/t limited time pt required assist to don socks  CARE Score: 3  Discharge Goal: Supervision or touching assistance      Toileting: declined need      Toilet Transfers:  NA  Device Used:    []   Standard Toilet         []   Grab Bars           []  Bedside Commode       []   Elevated Toilet          []   Other:        Bed Mobility:           [x]   Pt received out of bed       Transfers:    Sit--> Stand:  Ranging from CG/min A   Stand --> Sit:   CGA  Stand-Pivot:   CGA  Other:    Assistive device required for transfer:   RW       Functional Mobility:  NA   Assistance:  NA   Device:   []   Rolling Walker     []   Standard Walker []   Wheelchair        []   [employer]       []   4-Wheeled Noelle Budd         []   Cardiac Walker       []   Other:          Additional Therapeutic activities/exercises completed this date:     [x]   ADL Training   []   Balance/Postural training     []   Bed/Transfer Training   []   Endurance Training   []   Neuromuscular Re-ed   []   Nu-step:  Time:        Level:         #Steps:       []   Rebounder:    []  Seated     []  Standing        []   Supine Ther Ex (reps/sets):     []   Seated Ther Ex (reps/sets):     []   Standing Ther Ex (reps/sets):     []   Other:      Comments:      Patient/Caregiver Education and Training:   []   [employer] Equipment Use  []   Bed Mobility/Transfer Technique/Safety  []   Energy Conservation Tips  []   Family training  []   Postural Awareness  []   Safety During Functional Activities  []   Reinforced Patient's Precautions   []   Progress was updated and reviewed in Rehabtracker with patient and/or family this         date.     Treatment Plan for Next Session: Continue OT POC         Treatment/Activity Tolerance:   [x] Tolerated treatment with no adverse effects    [] Patient limited by fatigue  [] Patient limited by pain   [] Patient limited by medical complications:    [] Adverse reaction to Tx:   [] Significant change in status    Safety:       []  bed alarm set    [x]  chair alarm set    []  Pt refused alarms                []  Telesitter activated      []  Gait belt used during tx session      []other:       Number of Minutes/Billable Intervention  Therapeutic Exercise    ADL Self-care 65   Neuro Re-Ed    Therapeutic Activity    Group    Other:    TOTAL 65       Social History  Social/Functional History  Lives With: Son  Type of Home: House  Home Layout: One level  Home Access: Ramped entrance  Bathroom Shower/Tub: Tub/Shower unit,Shower chair with back  Bathroom Toilet: Standard  Bathroom Equipment: Grab bars in shower,Shower chair  Bathroom Accessibility: Leo Irving accessible (states once he is in bathroom he uses countertops and grab bars, but pt not able to clarify if this is because there is little room or just preference)  Home Equipment: Cane,4 wheeled walker,Rolling walker (pt thinks there may be a BSC from his wife in storage, but unsure)  ADL Assistance: Independent  Homemaking Assistance: Independent  Homemaking Responsibilities: Yes  Meal Prep Responsibility: Secondary  Laundry Responsibility: Primary  Cleaning Responsibility: Secondary  Bill Paying/Finance Responsibility: Primary  Shopping Responsibility: Secondary  Health Care Management: Primary (has pill box with alarms per pt. until recently [address] would set up, but once [facility] stopped he managed on own)  Ambulation Assistance: Independent (mod I with 4ww for up to 150'. pt used electronic carts at stores when able)  Transfer Assistance: Independent  Active : No  Patient's  Info: 3 sons assist in driving. Mode of Transportation: Car,SUV  Occupation: Retired  Type of occupation: Retired from [address] [place] [address] Street: TV, very little reading, no pets, doctors, son manages groceries,  Meds are managed via pill dispenser that son manages. Pt occasionally writes checks and some auto pay  Additional Comments: pt sleeps in flat bed. Pt has fallen >4 times in past year. Pt attributes many to low BS. No injury    Objective                                                                                    Goals:  (Update in navigator)  Short term goals  Time Frame for Short term goals: STGs=LTGs:  Long term goals  Time Frame for Long term goals : 10-14 days or until d/c  Long term goal 1: Pt will complete grooming tasks Ind seated  Long term goal 2: Pt will complete total body bathing c S  Long term goal 3: Pt will complete UB dressing c setup  Long term goal 4: Pt will complete LB dressing c supervision using AE PRN  Long term goal 5: Pt will doff/don footwear c supervision using AE PRN  Long term goals 6: Pt will complete toileting c S  Long term goal 7: Pt will complete functional transfers (bed, chair, toilet, shower) c DME PRN and S  Long term goal 8: Pt will perform therex/therax to facilitate increased strength/endurance/ax tolerance (c emphasis on dynamic standing balance >8 mins, BUE endurance, cognitive retraining) c SBA:        Plan of Care                                                                              Times per week: 5 days per week for a minimum of 60 minutes/day plus group as appropriate for 60 minutes.   Treatment to include Plan  Times per day: Daily  Current Treatment Recommendations: Deanne Mcgraw Education & Training,Patient/Caregiver Education & Training,Equipment Evaluation, Education, & procurement,Self-Care / ADL,Home Management Training,Cognitive/Perceptual Training    Electronically signed by   KHARI Arevlao,  4/11/2022, 3:14 PM

## 2022-04-11 NOTE — FLOWSHEET NOTE
Physical Rehabilitation: PHYSICAL THERAPY     [x] daily progress note       [] discharge       Patient Name:  Yoel Trent   :  1940 MRN: [id number]  Room:  [address] [place] [address] Date of Admission: 3/30/2022    Rehabilitation Diagnosis:     Metabolic encephalopathy [C28.01]  Metabolic encephalopathy [K46.69]    Date  2022   TIMES IN/OUT   [id number]   Group Tx Minutes 60   TOTAL Tx time seen 60   Restrictions/Precautions Restrictions/Precautions  Restrictions/Precautions: General Precautions,Fall Risk,Contact Precautions (suprapubic catheter)      Current Diet/Swallowing Issues ADULT DIET; Regular; 5 carb choices (75 gm/meal); Low Potassium (Less than 3000 mg/day)  ADULT ORAL NUTRITION SUPPLEMENT; Lunch; Diabetic Oral Supplement   Communication with other providers: [x] Ok to see per nursing   [] Medical hold and reason  [x] PTA Dania Wilkes) reported that patient's BP was low earlier and patient became very fatigued. Subjective observations: Pt agreeable to group session. Pt tired during session. Checked BP and it was WNL. Pt reported he did not sleep well last night. Pain level/location: Pt reported no pain during session. Alarm placed/where? Fall Risk  [] Pt taken to DR for lunch with nsg present   [x] Pt taken back to room by rehab aidfrederick Gaston) at end of group session. Group Activity:     Total time in group = 60 min   Exercise / Shahram CHAHALKD:MOHIT Easton participated in exercise and discussion on benefits and precautions during exercise. This provided the opportunity to have fun, build camaraderie, increase endurance, improve breathing techniques, balance, and strength. Yoel Trent performed a variety of seated activities as able, with focus on technique and safety during exercise. Also focused on warm-up, warm-down, endurance monitoring, strengthening & strategies, function, safety, and general social & communication opportunities with other group members.       Functional areas targeted:   [] Communication [x] Memory  [x] Coordination    [] Kitchen Safety [x] Problem Solving  [x] Strengthening     [x] Attention  [x] Endurance   [] Mobility    [x] Awareness/Insight [] Balance  [] Transfers  [x] Safety   [] Other (specify)  Participation:  [] Actively participated    [x] Required max cues toward end of session. Pt fatigued and falling asleep. Pt required max verbal stimulation and would participate, but would require continued verbal stimulation to keep on task. Education completed: Importance of exercise  Cognitive fx during this group: pt did well with paying attention initially, but then required vcs to stay on task as patient was fatigued towards end of group.    Family present: no         Assessment / Impression                                                          Treatment/Activity Tolerance:   [] Tolerated Treatment well:     [x] Patient limited by fatigue       [] Patient limited by medical complications:    [] Adverse Reaction to Tx:   [] Significant change in status      Number of Minutes/Billable Intervention  Gait Training    Therapeutic Exercise    Neuro Re-Ed    Therapeutic Activity    Wheelchair Propulsion    Group 60   Other:    TOTAL 60         Social History  Social/Functional History  Lives With: Son  Type of Home: House  Home Layout: One level  Home Access: Ramped entrance  Bathroom Shower/Tub: Tub/Shower unit,Shower chair with back  Bathroom Toilet: Standard  Bathroom Equipment: Grab bars in shower,Shower chair  Bathroom Accessibility: Chas Barron accessible (states once he is in bathroom he uses countertops and grab bars, but pt not able to clarify if this is because there is little room or just preference)  Home Equipment: [address] walker (pt thinks there may be a BSC from his wife in storage, but unsure)  ADL Assistance: [facility] [address]: Independent  Homemaking Responsibilities: Yes  Meal Prep Responsibility: Secondary  Laundry Responsibility: Primary  Cleaning Responsibility: Secondary  Bill Paying/Finance Responsibility: Primary  Shopping Responsibility: Secondary  Health Care Management: Primary (has pill box with alarms per pt. until recently Tyrell Hodge nursing would set up, but once Tyrell Hodge stopped he managed on own)  Ambulation Assistance: Independent (mod I with 4ww for up to 150'. pt used electronic carts at stores when able)  Transfer Assistance: Independent  Active : No  Patient's  Info: 3 sons assist in driving. 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 Pt occasionally writes checks and some auto pay  Additional Comments: pt sleeps in flat bed. Pt has fallen >4 times in past year. Pt attributes many to low BS. No injury    Objective                                                                                    Goals:  (Update in navigator)   : Long term goals  Time Frame for Long term goals : 12-14 days  Long term goal 1: Pt will perform bed mobility with mod I  Long term goal 2: Pt will perform sit to stand and pivot transfers with mod I, car transfers with CGA  Long term goal 3: Pt will ambulate 10' with 2ww with mod I, up to 150' with supervision including uneven surfaces  Long term goal 4: Pt will ascend/descend curb step with 2ww and up to 4 steps with B rails with supervision  Long term goal 5: Pt will  object from floor with 2ww and reacher with supervision:        Plan of Care                                                                              Times per week: Pt to be seen at least  5x per week for a minimum of 60 minutes as                               appropriate.   Treatment to include Current Treatment Recommendations: Felipa Weaver Mobility Training,Transfer Training,ADL/Self-care Training,Gait Training,Patient/Caregiver Education & Training,IADL Training,Stair training,Equipment Evaluation, Education, & procurement,Neuromuscular Re-education,Pain Management,Home Exercise [place] Training,Safety Education & Training,Cognitive/Perceptual Training,Cognitive Reorientation      Electronically signed by   Val Chavez, [id number]  4/11/2022, 6:05 PM

## 2022-04-11 NOTE — PROGRESS NOTES
Nephrology Progress Note  4/11/2022 2:32 PM  Subjective: Interval History: Anand Link is a 78 y.o. male More tired and weak today try to do rehab  Data:   Scheduled Meds:   insulin glargine  10 Units SubCUTAneous QAM    glipiZIDE  10 mg Oral BID AC    fluconazole  100 mg Oral Daily    insulin lispro  0-9 Units SubCUTAneous Nightly    sodium bicarbonate  650 mg Oral TID    insulin lispro  0-18 Units SubCUTAneous TID WC    miconazole   Topical BID    heparin (porcine)  5,000 Units SubCUTAneous 3 times per day    sodium chloride flush  5-40 mL IntraVENous 2 times per day    allopurinol  100 mg Oral Daily    alogliptin  6.25 mg Oral Daily    aspirin  81 mg Oral Daily    atorvastatin  40 mg Oral Nightly    carvedilol  3.125 mg Oral BID WC    levothyroxine  50 mcg Oral Daily    pregabalin  50 mg Oral BID     Continuous Infusions:   dextrose           CBC   Recent Labs     04/09/22  2251   WBC 10.2   HGB 9.5*   HCT 31.4*         BMP   Recent Labs     04/09/22  2251      K 4.8      CO2 22   PHOS 6.3*   BUN 84*   CREATININE 2.7*     Hepatic:   No results for input(s): AST, ALT, ALB, BILITOT, ALKPHOS in the last 72 hours. Troponin: No results for input(s): TROPONINI in the last 72 hours. BNP: No results for input(s): BNP in the last 72 hours. Lipids: No results for input(s): CHOL, HDL in the last 72 hours. Invalid input(s): LDLCALCU  ABGs: No results found for: PHART, PO2ART, XTE5ZWT  INR: No results for input(s): INR in the last 72 hours.   Renal Labs  Albumin:    Lab Results   Component Value Date    LABALBU 3.6 04/09/2022     Calcium:    Lab Results   Component Value Date    CALCIUM 9.5 04/09/2022     Phosphorus:    Lab Results   Component Value Date    PHOS 6.3 04/09/2022     U/A:    Lab Results   Component Value Date    NITRU NEGATIVE 04/06/2022    NITRU NEGATIVE 03/23/2013    COLORU COLORLESS 04/06/2022    WBCUA 544 04/06/2022    RBCUA 64 04/06/2022    MUCUS RARE 04/06/2022    TRICHOMONAS NONE SEEN 04/06/2022    YEAST FEW 04/06/2022    BACTERIA NEGATIVE 04/06/2022    CLARITYU CLOUDY 04/06/2022    SPECGRAV 1.015 04/06/2022    UROBILINOGEN NORMAL 04/06/2022    BILIRUBINUR NEGATIVE 04/06/2022    BLOODU LARGE 04/06/2022    GLUCOSEU 1,000 03/23/2013    KETUA NEGATIVE 04/06/2022    AMORPHOUS RARE 11/16/2021     ABG:  No results found for: PHART, OPU5OMN, PO2ART, DTN6LEK, BEART, THGBART, DFW1DSO, M2JVHRJG  HgBA1c:    Lab Results   Component Value Date    LABA1C 9.2 03/22/2022     Microalbumen/Creatinine ratio:  No components found for: RUCREAT  TSH:  No results found for: TSH  IRON:    Lab Results   Component Value Date    IRON 15 03/22/2022     Iron Saturation:  No components found for: PERCENTFE  TIBC:    Lab Results   Component Value Date    TIBC 214 03/22/2022     FERRITIN:    Lab Results   Component Value Date    FERRITIN 352 03/22/2022     RPR:  No results found for: RPR  MITALI:  No results found for: ANATITER, MITALI  24 Hour Urine for Creatinine Clearance:  No components found for: CREAT4, UHRS10, UTV10      Objective:   I/O: 04/10 0701 - 04/11 0700  In: 350 [P.O.:350]  Out: 1800 [Urine:1800]  I/O last 3 completed shifts: In: 450 [P.O.:450]  Out: 3400 [Urine:3400]  No intake/output data recorded. Vitals: /69   Pulse 72   Temp 98.1 °F (36.7 °C)   Resp 17   Ht 5' 8\" (1.727 m)   Wt 197 lb 1.5 oz (89.4 kg)   SpO2 99%   BMI 29.97 kg/m²  {  General appearance: awake weak  HEENT: Head: Normal, normocephalic, atraumatic. Neck: supple, symmetrical, trachea midline  Lungs: diminished breath sounds bilaterally  Heart: S1, S2 normal  Abdomen: abnormal findings:  soft nt  Extremities: edema trace  Neurologic: Mental status: alertness: Weak tired         Assessment and Plan:      IMP:    1.   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 4 monitor potassium   will follow in the above setting give time to recover         Marga Leon MD, MD

## 2022-04-11 NOTE — PROGRESS NOTES
Comprehensive Nutrition Assessment    Type and Reason for Visit:  Reassess    Nutrition Recommendations/Plan:   · Will discontinue supplements and start diabetic oral nutrition supplement once daily   · Continue current diet order   · Encourage adequate hydration and higher protein intake at meals   · Please recommend less concentrated sweet foods at this time   · Consider increasing insulin coverage as needed     Nutrition Assessment:  Remains on carb controlled diet with low potassium modifier. Receives frozen and fortified pudding nutrition supplements, when available. Meal intake has improved to greater than half (51-75%) of meals. Blood sugars have been uncontrolled. Encourage pt to consume higher protein at meals. Continue as moderate nutrition risk. Malnutrition Assessment:  Malnutrition Status: At risk for malnutrition (Comment)    Context:  Acute Illness       Estimated Daily Nutrient Needs:  Energy (kcal):  0552-9962; Weight Used for Energy Requirements:  Current     Protein (g):  70-84 (1-1.2 g/kg); Weight Used for Protein Requirements:  Ideal        Fluid (ml/day):  2000; Method Used for Fluid Requirements:  1 ml/kcal      Nutrition Related Findings:  Contact isolation 2/2 ESBL infection. Most recent POCT greater than 200 mg/dL. Rx: Glipizide      Wounds:  Wound Consult Pending (red skin folds)       Current Nutrition Therapies:    ADULT ORAL NUTRITION SUPPLEMENT; Breakfast; Fortified Pudding Oral Supplement  ADULT ORAL NUTRITION SUPPLEMENT; Lunch, Dinner; Frozen Oral Supplement  ADULT DIET; Regular; 5 carb choices (75 gm/meal);  Low Potassium (Less than 3000 mg/day)    Anthropometric Measures:  · Height: 5' 8\" (172.7 cm)  · Current Body Weight: 200 lb 13.4 oz (91.1 kg)   · Admission Body Weight: 201 lb 11.5 oz (91.5 kg)    · Usual Body Weight: 204 lb (92.5 kg) (per hx)     · Ideal Body Weight: 154 lbs; % Ideal Body Weight 133.1 %   · BMI: 30.5  · Adjusted Body Weight:  ; No Adjustment   · BMI Categories: Obese Class 1 (BMI 30.0-34. 9)       Nutrition Diagnosis:   · Altered nutrition-related lab values related to acute injury/trauma,food and nutrition related knowledge deficit as evidenced by lab values    Nutrition Interventions:   Food and/or Nutrient Delivery:  Continue Current Diet,Modify Oral Nutrition Supplement  Nutrition Education/Counseling:  No recommendation at this time   Coordination of Nutrition Care:  Continue to monitor while inpatient,Coordination of Community Care    Goals:  Patient will consume at least 75% of meals during stay       Nutrition Monitoring and Evaluation:   Behavioral-Environmental Outcomes:  None Identified   Food/Nutrient Intake Outcomes:  Food and Nutrient Intake,Supplement Intake  Physical Signs/Symptoms Outcomes:  Biochemical Data,GI Status,Meal Time Behavior,Weight     Discharge Planning:    Continue current diet     Electronically signed by Daniel Saldivar RD, LD on 4/11/22 at 10:57 AM EDT    Contact: [phone]

## 2022-04-11 NOTE — PROGRESS NOTES
Physical Therapy      [x] daily progress note       [] discharge       Patient Name:  Dahiana Mathur   :  1940 MRN: [id number]  Room:  [address] [place] [address] Date of Admission: 3/30/2022  Rehabilitation Diagnosis:   Metabolic encephalopathy [V16.64]  Metabolic encephalopathy [T89.02]       Date 2022       Day of ARU Week:  6   Time IN/OUT 7258-8811   Individual Tx Minutes 60   TOTAL Tx Time Mins 60   Variance Time    Variance Time []   Refusal due to:     []   Medical hold/reason:    []   Illness   []   Off Unit for test/procedure  []   Extra time needed to complete task  []   Therapeutic need  []   Other (specify):   Restrictions Restrictions/Precautions  Restrictions/Precautions: General Precautions,Fall Risk,Contact Precautions (suprapubic catheter)      Communication with other providers: [x]   OK to see per nursing:     []   Spoke with team member regarding:      Subjective observations and cognitive status: Pt resting in bed, willing to participate. A pt with LB dressing at EOB; Pt alert and oriented; During ambulation pt with LOB   Pain level/location:    Reports of pain in R hip and thigh, did not rate   Discharge recommendations  Anticipated discharge date:    Destination: unclear at this time. Feel pt will need 24 hour care which son cannot provide   []?home alone   []?home alone with assist PRN     []? home w/ family      []? Continuous supervision  []? SNF    []? Assisted living     []? Other:  Continued therapy: []?C PT  []? OUTPATIENT PT   []? No Further PT  []? SNF PT  Caregiver training recommended: []? Yes  []? No   Equipment needs: 2ww       Bed Mobility:         []   Pt received out of bed     Lying to Sitting on Side of Bed  Assistance Needed: Partial/moderate assistance  Comment: min-mod A with bed features, max time and cues;  Req A /cues for catheter management/awareness prior to transferring opposite direction OOB  CARE Score: 3  Discharge Goal: Independent    Transfers:    Sit to technique/sequencing  [x]   Proper use of assistive device  []   Advanced mobility safety and technique  []   Reinforced patient's precautions/mobility while maintaining precautions  [x]   Postural awareness  []   Family training    Specific instructions for next treatment: car transfer, gait progression, uneven surface if able, LE strengthening    Treatment/Activity Tolerance:   [] Tolerated treatment with no adverse effects    [x] Patient limited by decreased BP with ambulation, lightheadedness  [] Patient limited by pain   [] Patient limited by medical complications:    [] Adverse reaction to Tx:   [] Significant change in status    Safety:       []  bed alarm set    []  chair alarm set    []  Pt refused alarms                []  Telesitter activated      [x]  Gait belt used during tx session      [x]other: pt left in c/o PT for group ex session        Number of Minutes/Billable Intervention  Gait Training 15   Therapeutic Exercise    Neuro Re-Ed    Therapeutic Activity 25   Wheelchair Propulsion 20   Group    Other:    TOTAL 60         Social History  Social/Functional History  Lives With: Son  Type of Home: House  Home Layout: One level  Home Access: Ramped entrance  Bathroom Shower/Tub: Tub/Shower unit,Shower chair with back  Bathroom Toilet: Standard  Bathroom Equipment: Grab bars in shower,Shower chair  Bathroom Accessibility: Nestora Dom accessible (states once he is in bathroom he uses countertops and grab bars, but pt not able to clarify if this is because there is little room or just preference)  Home Equipment: [address]er (pt thinks there may be a BSC from his wife in storage, but unsure)  ADL Assistance: Independent  Homemaking Assistance: Independent  Homemaking Responsibilities: Yes  Meal Prep Responsibility: Secondary  Laundry Responsibility: Primary  Cleaning Responsibility: Secondary  Bill Paying/Finance Responsibility: Primary  Shopping Responsibility: [address] Management: Primary (has pill box with alarms per pt. until recently [address] would set up, but once Tyrell Hodge stopped he managed on own)  Ambulation Assistance: Independent (mod I with 4ww for up to 150'. pt used electronic carts at stores when able)  Transfer Assistance: Independent  Active : No  Patient's  Info: 3 sons assist in driving. 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 Pt occasionally writes checks and some auto pay  Additional Comments: pt sleeps in flat bed. Pt has fallen >4 times in past year. Pt attributes many to low BS. No injury    Objective                                                                                    Goals:  (Update in navigator)   : Long term goals  Time Frame for Long term goals : 12-14 days  Long term goal 1: Pt will perform bed mobility with mod I  Long term goal 2: Pt will perform sit to stand and pivot transfers with mod I, car transfers with CGA  Long term goal 3: Pt will ambulate 10' with 2ww with mod I, up to 150' with supervision including uneven surfaces  Long term goal 4: Pt will ascend/descend curb step with 2ww and up to 4 steps with B rails with supervision  Long term goal 5: Pt will  object from floor with 2ww and reacher with supervision:        Plan of Care                                                                              Times per week: 5 days per week for a minimum of 60 minutes/day plus group as appropriate for 60 minutes.   Treatment to include Current Treatment Recommendations: Strengthening,ROM,Balance Training,Functional Mobility Training,Transfer Training,ADL/Self-care Training,Gait Training,Patient/Caregiver Education & Training,IADL Training,Stair training,Equipment Evaluation, Education, & procurement,Neuromuscular Re-education,Pain Management,Home Exercise [place] Training,Safety Education & Training,Cognitive/Perceptual Training,Cognitive Reorientation    Electronically signed by   Joshua Kelly, PTA #[id number]  4/11/2022, 12:28 PM

## 2022-04-12 LAB
ALBUMIN SERPL-MCNC: 3.2 GM/DL (ref 3.4–5)
ANION GAP SERPL CALCULATED.3IONS-SCNC: 12 MMOL/L (ref 4–16)
BUN BLDV-MCNC: 60 MG/DL (ref 6–23)
CALCIUM SERPL-MCNC: 9.1 MG/DL (ref 8.3–10.6)
CHLORIDE BLD-SCNC: 108 MMOL/L (ref 99–110)
CO2: 23 MMOL/L (ref 21–32)
CREAT SERPL-MCNC: 2 MG/DL (ref 0.9–1.3)
GFR AFRICAN AMERICAN: 39 ML/MIN/1.73M2
GFR NON-AFRICAN AMERICAN: 32 ML/MIN/1.73M2
GLUCOSE BLD-MCNC: 124 MG/DL (ref 70–99)
GLUCOSE BLD-MCNC: 140 MG/DL (ref 70–99)
GLUCOSE BLD-MCNC: 162 MG/DL (ref 70–99)
GLUCOSE BLD-MCNC: 190 MG/DL (ref 70–99)
GLUCOSE BLD-MCNC: 197 MG/DL (ref 70–99)
GLUCOSE BLD-MCNC: 97 MG/DL (ref 70–99)
PHOSPHORUS: 5.3 MG/DL (ref 2.5–4.9)
POTASSIUM SERPL-SCNC: 4.6 MMOL/L (ref 3.5–5.1)
SODIUM BLD-SCNC: 143 MMOL/L (ref 135–145)

## 2022-04-12 PROCEDURE — 80069 RENAL FUNCTION PANEL: CPT

## 2022-04-12 PROCEDURE — 97530 THERAPEUTIC ACTIVITIES: CPT

## 2022-04-12 PROCEDURE — 94761 N-INVAS EAR/PLS OXIMETRY MLT: CPT

## 2022-04-12 PROCEDURE — 36415 COLL VENOUS BLD VENIPUNCTURE: CPT

## 2022-04-12 PROCEDURE — 6370000000 HC RX 637 (ALT 250 FOR IP): Performed by: PHYSICAL MEDICINE & REHABILITATION

## 2022-04-12 PROCEDURE — 97110 THERAPEUTIC EXERCISES: CPT

## 2022-04-12 PROCEDURE — 6370000000 HC RX 637 (ALT 250 FOR IP): Performed by: INTERNAL MEDICINE

## 2022-04-12 PROCEDURE — 2500000003 HC RX 250 WO HCPCS: Performed by: PHYSICAL MEDICINE & REHABILITATION

## 2022-04-12 PROCEDURE — 97116 GAIT TRAINING THERAPY: CPT

## 2022-04-12 PROCEDURE — 6360000002 HC RX W HCPCS: Performed by: INTERNAL MEDICINE

## 2022-04-12 PROCEDURE — 82962 GLUCOSE BLOOD TEST: CPT

## 2022-04-12 PROCEDURE — 97542 WHEELCHAIR MNGMENT TRAINING: CPT

## 2022-04-12 PROCEDURE — 1280000000 HC REHAB R&B

## 2022-04-12 PROCEDURE — 97535 SELF CARE MNGMENT TRAINING: CPT

## 2022-04-12 RX ADMIN — ASPIRIN 81 MG 81 MG: 81 TABLET ORAL at 08:42

## 2022-04-12 RX ADMIN — SODIUM BICARBONATE 650 MG: 650 TABLET ORAL at 08:42

## 2022-04-12 RX ADMIN — HEPARIN SODIUM 5000 UNITS: 5000 INJECTION INTRAVENOUS; SUBCUTANEOUS at 21:15

## 2022-04-12 RX ADMIN — HEPARIN SODIUM 5000 UNITS: 5000 INJECTION INTRAVENOUS; SUBCUTANEOUS at 05:30

## 2022-04-12 RX ADMIN — ACETAMINOPHEN 650 MG: 325 TABLET ORAL at 21:15

## 2022-04-12 RX ADMIN — ATORVASTATIN CALCIUM 40 MG: 40 TABLET, FILM COATED ORAL at 21:15

## 2022-04-12 RX ADMIN — CARVEDILOL 3.12 MG: 6.25 TABLET, FILM COATED ORAL at 08:42

## 2022-04-12 RX ADMIN — ACETAMINOPHEN 650 MG: 325 TABLET ORAL at 05:30

## 2022-04-12 RX ADMIN — MICONAZOLE NITRATE: 2 POWDER TOPICAL at 21:18

## 2022-04-12 RX ADMIN — FLUCONAZOLE 100 MG: 100 TABLET ORAL at 08:42

## 2022-04-12 RX ADMIN — INSULIN GLARGINE 10 UNITS: 100 INJECTION, SOLUTION SUBCUTANEOUS at 08:45

## 2022-04-12 RX ADMIN — CARVEDILOL 3.12 MG: 6.25 TABLET, FILM COATED ORAL at 15:34

## 2022-04-12 RX ADMIN — SODIUM BICARBONATE 650 MG: 650 TABLET ORAL at 15:34

## 2022-04-12 RX ADMIN — MICONAZOLE NITRATE: 2 POWDER TOPICAL at 08:55

## 2022-04-12 RX ADMIN — LEVOTHYROXINE SODIUM 50 MCG: 0.05 TABLET ORAL at 05:30

## 2022-04-12 RX ADMIN — PREGABALIN 50 MG: 50 CAPSULE ORAL at 21:15

## 2022-04-12 RX ADMIN — INSULIN LISPRO 2 UNITS: 100 INJECTION, SOLUTION INTRAVENOUS; SUBCUTANEOUS at 21:15

## 2022-04-12 RX ADMIN — ALLOPURINOL 100 MG: 100 TABLET ORAL at 08:42

## 2022-04-12 RX ADMIN — ALOGLIPTIN 6.25 MG: 6.25 TABLET, FILM COATED ORAL at 08:42

## 2022-04-12 RX ADMIN — MELATONIN 3 MG: at 23:10

## 2022-04-12 RX ADMIN — HEPARIN SODIUM 5000 UNITS: 5000 INJECTION INTRAVENOUS; SUBCUTANEOUS at 15:35

## 2022-04-12 RX ADMIN — SODIUM BICARBONATE 650 MG: 650 TABLET ORAL at 21:15

## 2022-04-12 RX ADMIN — PREGABALIN 50 MG: 50 CAPSULE ORAL at 08:42

## 2022-04-12 ASSESSMENT — PAIN SCALES - GENERAL
PAINLEVEL_OUTOF10: 5
PAINLEVEL_OUTOF10: 5
PAINLEVEL_OUTOF10: 8

## 2022-04-12 NOTE — PROGRESS NOTES
Yoel Trent    : 1940  Acct #: [de-identified]  MRN: [id number]              PM&R Progress Note      Admitting diagnosis: ***    Comorbid diagnoses impacting rehabilitation: ***    Chief complaint: ***    Prior (baseline) level of function: Independent.     Current level of function:         Current  IRF-LESTER and Goals:   Occupational Therapy:    Short term goals  Time Frame for Short term goals: STGs=LTGs :   Long term goals  Time Frame for Long term goals : 10-14 days or until d/c  Long term goal 1: Pt will complete grooming tasks Ind seated  Long term goal 2: Pt will complete total body bathing c S  Long term goal 3: Pt will complete UB dressing c setup  Long term goal 4: Pt will complete LB dressing c supervision using AE PRN  Long term goal 5: Pt will doff/don footwear c supervision using AE PRN  Long term goals 6: Pt will complete toileting c S  Long term goal 7: Pt will complete functional transfers (bed, chair, toilet, shower) c DME PRN and S  Long term goal 8: Pt will perform therex/therax to facilitate increased strength/endurance/ax tolerance (c emphasis on dynamic standing balance >8 mins, BUE endurance, cognitive retraining) c SBA :                                       Eating: Eating  Assistance Needed: Setup or clean-up assistance  Comment: assist to open packages/containers  CARE Score: 5  Discharge Goal: Independent       Oral Hygiene: Oral Hygiene  Assistance Needed: Supervision or touching assistance  Comment: seated at sink, cues for thoroughness  CARE Score: 4  Discharge Goal: Independent    UB/LB Bathing: Shower/Bathe Self  Assistance Needed: Supervision or touching assistance  Comment: Pt able to bathe UEs, chest, abdomen, thighs, perineal area and feet; CGA in stance to bathe bottom  CARE Score: 4  Discharge Goal: Supervision or touching assistance    UB Dressing: Upper Body Dressing  Assistance Needed: Supervision or touching assistance  Comment: Pt able to doff shirt; able to thread BUEs and pull overhead  CARE Score: 4  Discharge Goal: Set-up or clean-up assistance         LB Dressing: Lower Body Dressing  Assistance Needed: Substantial/maximal assistance  Comment: d/t limited time pt required max A to thread BLEs and mccrary bag into brief and pants; min A to mange over hips  CARE Score: 2  Discharge Goal: Supervision or touching assistance    Donning and [place] Footwear: Putting On/Taking Off Footwear  Assistance Needed: Partial/moderate assistance  Comment: pt able to doff socks with reacher; d/t limited time pt required assist to don socks  CARE Score: 3  Discharge Goal: Supervision or touching assistance      Toileting: Toileting Hygiene  Assistance Needed: Substantial/maximal assistance  Comment: able to manage depends down, required assist for BM hygiene and pants management for balance  CARE Score: 2  Discharge Goal: Supervision or touching assistance      Toilet Transfers:   Toilet Transfer  Assistance Needed: Partial/moderate assistance  Comment: required Mod A to<>from WC c max cues to fully turn  CARE Score: 3  Discharge Goal: Supervision or touching assistance    Physical Therapy:         Long term goals  Time Frame for Long term goals : 12-14 days  Long term goal 1: Pt will perform bed mobility with mod I  Long term goal 2: Pt will perform sit to stand and pivot transfers with mod I, car transfers with CGA  Long term goal 3: Pt will ambulate 10' with 2ww with mod I, up to 150' with supervision including uneven surfaces  Long term goal 4: Pt will ascend/descend curb step with 2ww and up to 4 steps with B rails with supervision  Long term goal 5: Pt will  object from floor with 2ww and reacher with supervision      Bed Mobility:   Sit to Lying  Assistance Needed: Substantial/maximal assistance  Comment: max assist for B LE, improved control of trunk  CARE Score: 2  Discharge Goal: Independent  Roll Left and Right  Assistance Needed: Substantial/maximal assistance  Comment: max assist with rails  CARE Score: 2  Discharge Goal: Independent  Lying to Sitting on Side of Bed  Assistance Needed: Partial/moderate assistance  Comment: min-mod A with bed features, max time and cues;  Req A /cues for catheter management/awareness prior to transferring opposite direction OOB  CARE Score: 3  Discharge Goal: Independent    Transfers:    Sit to Stand  Assistance Needed: Partial/moderate assistance  Comment: Min -CGA, initially retro req cues for COG fwd, A with catheter managment onto RW prior to standing  Reason if not Attempted: Not attempted due to medical condition or safety concerns  CARE Score: 3  Discharge Goal: Independent  Chair/Bed-to-Chair Transfer  Assistance Needed: Supervision or touching assistance  Comment: CGA for balance using RW  Reason if not Attempted: Not attempted due to medical condition or safety concerns  CARE Score: 4  Discharge Goal: Independent  Toilet Transfer  Assistance Needed: Substantial/maximal assistance  Comment: max assist and max verbal cues for techique, posture, safety  CARE Score: 2  Car Transfer  Assistance Needed: Partial/moderate assistance  Comment: mod assist for trunk getting out of car  Reason if not Attempted: Not attempted due to medical condition or safety concerns  CARE Score: 3  Discharge Goal: Supervision or touching assistance    Ambulation:    Walking Ability  Does the Patient Walk?: Yes     Walk 10 Feet  Assistance Needed: Supervision or touching assistance  Comment: CGA with RW, flexed posture  Reason if not Attempted: Not attempted due to medical condition or safety concerns  CARE Score: 4  Discharge Goal: Independent     Walk 50 Feet with Two Turns  Comment: 27' max distance due to orthostatic hypotension episode, pt symptomatic  Reason if not Attempted: Not attempted due to medical condition or safety concerns  CARE Score: 88  Discharge Goal: Supervision or touching assistance     Walk 150 Feet  Reason if not Attempted: Not attempted due to medical condition or safety concerns  CARE Score: 88  Discharge Goal: Supervision or touching assistance     Walking 10 Feet on Uneven Surfaces  Assistance Needed: Partial/moderate assistance  Comment: min assist with 2ww  Reason if not Attempted: Not attempted due to medical condition or safety concerns  CARE Score: 3  Discharge Goal: Supervision or touching assistance     1 Step (Curb)  Assistance Needed: Partial/moderate assistance  Comment: mod assist on 4\" step  Reason if not Attempted: Not attempted due to medical condition or safety concerns  CARE Score: 3  Discharge Goal: Supervision or touching assistance     4 Steps  Reason if not Attempted: Not attempted due to medical condition or safety concerns  CARE Score: 88  Discharge Goal: Supervision or touching assistance     12 Steps  Reason if not Attempted: Not applicable  CARE Score: 9  Discharge Goal: Not Applicable       Wheelchair:  w/c Ability: Wheelchair Ability  Uses a Wheelchair and/or Scooter?: No  Wheel 50 Feet with Two Turns  Assistance Needed: Partial/moderate assistance  Comment: 68' max distance with min A to SBA due for obstacle clearnce  CARE Score: 3  Wheel 150 Feet  Assistance Needed: Partial/moderate assistance  Comment: 68' max distance  CARE Score: 3          Balance:        Object: Picking Up Object  Assistance Needed: Substantial/maximal assistance  Comment: started with mod assist as he took off his L hand(due to leaning to R), and able to grasp object, then as pt bringing it up, lost balance posterior and needed max assist to recover  Reason if not Attempted: Not attempted due to medical condition or safety concerns  CARE Score: 2  Discharge Goal: Supervision or touching assistance    I      Exam:    Blood pressure (!) 157/70, pulse 82, temperature 97.2 °F (36.2 °C), temperature source Oral, resp. rate 16, height 5' 8\" (1.727 m), weight 216 lb 11.4 oz (98.3 kg), SpO2 96 %.     General: ***    HEENT: ***    Pulmonary: ***    Cardiac: ***    Abdomen: Patient's abdomen is soft and nondistended. Bowel sounds were present throughout. There was no rebound, guarding or masses noted. Upper extremities: ***    Lower extremities: ***    Sitting balance was ***. Standing balance was ***.     Lab Results   Component Value Date    WBC 10.2 04/09/2022    HGB 9.5 (L) 04/09/2022    HCT 31.4 (L) 04/09/2022    MCV 98.1 04/09/2022     04/09/2022     Lab Results   Component Value Date    INR 1.20 03/17/2022    INR 0.99 05/19/2015    INR 1.31 03/23/2013    PROTIME 15.5 (H) 03/17/2022    PROTIME 11.3 05/19/2015    PROTIME 14.2 03/23/2013     Lab Results   Component Value Date    CREATININE 2.7 (H) 04/09/2022    BUN 84 (H) 04/09/2022     04/09/2022    K 4.8 04/09/2022     04/09/2022    CO2 22 04/09/2022     Lab Results   Component Value Date    ALT 12 04/04/2022    AST 14 (L) 04/04/2022    ALKPHOS 108 04/04/2022    BILITOT 0.2 04/04/2022       Expected length of stay  prior to a supervised level of function for discharge home with a walker and [facility] OT/PT is ***    Recommendations:    ***

## 2022-04-12 NOTE — CARE COORDINATION
LSW called patient's son Sharon Noriega ([phone]) to discuss discharge plan. Message left on voicemail and LSW awaits call back. 12:10 PM  LSW went to patient's room to discuss discharge plan. Patient's son and Saad Bullock present. LSW explained that patient still isn't to independence for discharge. LSW also explained that patient's son Sharon Noriega expressed this level may be necessary due to being home alone all day. Hunter Dasilva verbalized agreement that additional time for strengthening and balance may be needed, with ultimate goal for all for patient to return home. Patient verbalized agreement with this plan and would like LSW to pursue SNF placement to [facility], [facility], and [place]. 12:20 PM  LSW called Tomasa with [facility] ([phone]). Message with referral left on confidential voicemail. LSW then called [facility] with [facility] ([phone]). [facility] to check into patient and call LSW back. 2:15 PM  LSW called Tomasa back to inform that patient is a [facility] so it can be taken into consideration for SNF stay.

## 2022-04-12 NOTE — PROGRESS NOTES
Progress Note( Dr. Meghan Rouse)  4/11/2022  Subjective:   Admit Date: 3/30/2022  PCP: Becky Martins MD    Admitted For : Patient was initially admitted to medical floor for sepsis from UTI and generalized  weakness    Consulted For: Better control of blood glucose    Interval History: Patient was transferred from medical floor on 3/30/2022 and was admitted to acute rehab unit for rehabilitation and physical therapy  Blood sugar control is better    Patient seem to be more awake and alert  Somewhat  seem to be confused    Denies any chest pains,   Denies SOB . Denies nausea or vomiting. No new bowel or bladder symptoms. Intake/Output Summary (Last 24 hours) at 4/11/2022 2342  Last data filed at 4/11/2022 6384  Gross per 24 hour   Intake 0 ml   Output 1050 ml   Net -1050 ml       DATA    CBC:   Recent Labs     04/09/22  2251   WBC 10.2   HGB 9.5*       CMP:  Recent Labs     04/09/22  2251      K 4.8      CO2 22   BUN 84*   CREATININE 2.7*   CALCIUM 9.5   LABALBU 3.6     Lipids: No results found for: CHOL, HDL, TRIG  Glucose:  Recent Labs     04/11/22  1039 04/11/22  1704 04/11/22 2025   POCGLU 214* 117* 194*     [id number]:  Lab Results   Component Value Date    LABA1C 9.2 03/22/2022     High Sensitivity TSH:   Lab Results   Component Value Date    TSHHS 6.190 03/25/2022     Free T3: No results found for: FT3  Free T4:  Lab Results   Component Value Date    T4FREE 1.15 03/27/2022       XR CHEST PORTABLE   Final Result   No acute cardiopulmonary abnormality. Favor mild soft tissue prominence over the left lung base less likely trace   pleural effusion or consolidation. Attention with a follow-up lateral view   if clinically indicated.               Scheduled Medicines   Medications:    insulin glargine  10 Units SubCUTAneous QAM    glipiZIDE  10 mg Oral BID AC    fluconazole  100 mg Oral Daily    insulin lispro  0-9 Units SubCUTAneous Nightly    sodium bicarbonate  650 mg Oral TID    insulin lispro  0-18 Units SubCUTAneous TID WC    miconazole   Topical BID    heparin (porcine)  5,000 Units SubCUTAneous 3 times per day    sodium chloride flush  5-40 mL IntraVENous 2 times per day    allopurinol  100 mg Oral Daily    alogliptin  6.25 mg Oral Daily    aspirin  81 mg Oral Daily    atorvastatin  40 mg Oral Nightly    carvedilol  3.125 mg Oral BID WC    levothyroxine  50 mcg Oral Daily    pregabalin  50 mg Oral BID      Infusions:    dextrose           Objective:   Vitals: BP (!) 157/70   Pulse 82   Temp 97.2 °F (36.2 °C) (Oral)   Resp 16   Ht 5' 8\" (1.727 m)   Wt 216 lb 11.4 oz (98.3 kg)   SpO2 96%   BMI 32.95 kg/m²   General appearance: alert and cooperative with exam  Neck: no JVD or bruit  Thyroid : Normal lobes   Lungs: Has Vesicular Breath sounds   Heart:  regular rate and rhythm  Abdomen: soft, non-tender; bowel sounds normal; no masses,  no organomegaly has Surapubic Catheter   Musculoskeletal: Normal  Extremities: extremities normal, , no edema  Neurologic:  Awake, alert, oriented to name, place and time. Cranial nerves II-XII are grossly intact. Motor weakness . Sensory is intact. ,  and gait is abnormal.unstable     Assessment:     Patient Active Problem List:     Gait disturbance     Generalized weakness     Diabetes mellitus (HCC)     Osteoarthritis     Anemia of chronic disorder     Infected implanted bladder sphincter cuff     Escherichia coli urinary tract infection     Hypertension     Sepsis ([address]     Type 2 diabetes mellitus with hypoglycemia without coma ([address]     UTI (urinary tract infection) due to urinary indwelling catheter (HCC)     Hyperkalemia     Acute kidney failure (HCC)     Leg weakness, bilateral     Type 2 diabetes mellitus with neurological manifestations, uncontrolled ([address]     Complicated UTI (urinary tract infection)     Essential hypertension     Severe muscle deconditioning     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 ([address] Frequent falls     Chronic kidney disease, stage 3a ([facility]     Uncontrolled type 2 diabetes mellitus with peripheral neuropathy ([address]     Prostate cancer ([facility]     Suprapubic catheter ([facility]     Sepsis secondary to UTI ([facility]      Plan:     1. Reviewed POC blood glucose . Labs and X ray results   2. Reviewed Current Medicines   3. On m Correction bolus Humalog/ Basal Lantus Insulin. 4. Monitor Blood glucose frequently   5. Modified  the dose of Insulin/ other medicines as needed  6. On low-dose Synthroid of 50 mg/day  7. Patient was transferred to acute rehab unit on 3/30//2022   8. Patient to be participating in physical therapy and Occupational Therapy of acute rehab unit  9. Will follow     .      Gage Bran MD, MD

## 2022-04-12 NOTE — PROGRESS NOTES
Nephrology Progress Note  4/12/2022 7:20 AM  Subjective: Interval History: Joslyn Wheeler is a 78 y.o. male appear better today and plan dc later this week    Data:   Scheduled Meds:   insulin glargine  10 Units SubCUTAneous QAM    glipiZIDE  10 mg Oral BID AC    fluconazole  100 mg Oral Daily    insulin lispro  0-9 Units SubCUTAneous Nightly    sodium bicarbonate  650 mg Oral TID    insulin lispro  0-18 Units SubCUTAneous TID WC    miconazole   Topical BID    heparin (porcine)  5,000 Units SubCUTAneous 3 times per day    sodium chloride flush  5-40 mL IntraVENous 2 times per day    allopurinol  100 mg Oral Daily    alogliptin  6.25 mg Oral Daily    aspirin  81 mg Oral Daily    atorvastatin  40 mg Oral Nightly    carvedilol  3.125 mg Oral BID WC    levothyroxine  50 mcg Oral Daily    pregabalin  50 mg Oral BID     Continuous Infusions:   dextrose           CBC   Recent Labs     04/09/22  2251   WBC 10.2   HGB 9.5*   HCT 31.4*         BMP   Recent Labs     04/09/22  2251      K 4.8      CO2 22   PHOS 6.3*   BUN 84*   CREATININE 2.7*     Hepatic:   No results for input(s): AST, ALT, ALB, BILITOT, ALKPHOS in the last 72 hours. Troponin: No results for input(s): TROPONINI in the last 72 hours. BNP: No results for input(s): BNP in the last 72 hours. Lipids: No results for input(s): CHOL, HDL in the last 72 hours. Invalid input(s): LDLCALCU  ABGs: No results found for: PHART, PO2ART, EMN8HMT  INR: No results for input(s): INR in the last 72 hours.   Renal Labs  Albumin:    Lab Results   Component Value Date    LABALBU 3.6 04/09/2022     Calcium:    Lab Results   Component Value Date    CALCIUM 9.5 04/09/2022     Phosphorus:    Lab Results   Component Value Date    PHOS 6.3 04/09/2022     U/A:    Lab Results   Component Value Date    NITRU NEGATIVE 04/06/2022    NITRU NEGATIVE 03/23/2013    COLORU COLORLESS 04/06/2022    WBCUA 544 04/06/2022    RBCUA 64 04/06/2022    MUCUS RARE 04/06/2022    TRICHOMONAS NONE SEEN 04/06/2022    YEAST FEW 04/06/2022    BACTERIA NEGATIVE 04/06/2022    CLARITYU CLOUDY 04/06/2022    SPECGRAV 1.015 04/06/2022    UROBILINOGEN NORMAL 04/06/2022    BILIRUBINUR NEGATIVE 04/06/2022    BLOODU LARGE 04/06/2022    GLUCOSEU 1,000 03/23/2013    KETUA NEGATIVE 04/06/2022    AMORPHOUS RARE 11/16/2021     ABG:  No results found for: PHART, NCK6QLE, PO2ART, PPD8VLZ, BEART, THGBART, WIO7BBV, E1EPMUQP  HgBA1c:    Lab Results   Component Value Date    LABA1C 9.2 03/22/2022     Microalbumen/Creatinine ratio:  No components found for: RUCREAT  TSH:  No results found for: TSH  IRON:    Lab Results   Component Value Date    IRON 15 03/22/2022     Iron Saturation:  No components found for: PERCENTFE  TIBC:    Lab Results   Component Value Date    TIBC 214 03/22/2022     FERRITIN:    Lab Results   Component Value Date    FERRITIN 352 03/22/2022     RPR:  No results found for: RPR  MITALI:  No results found for: ANATITER, MITALI  24 Hour Urine for Creatinine Clearance:  No components found for: CREAT4, UHRS10, UTV10      Objective:   I/O: 04/11 0701 - 04/12 0700  In: -   Out: 1200 [Urine:1200]  I/O last 3 completed shifts: In: 0   Out: 3000 [Urine:3000]  No intake/output data recorded. Vitals: BP (!) 142/67   Pulse 75   Temp 97.7 °F (36.5 °C) (Oral)   Resp 14   Ht 5' 8\" (1.727 m)   Wt 215 lb 6.2 oz (97.7 kg)   SpO2 100%   BMI 32.75 kg/m²  {  General appearance: awake weak  HEENT: Head: Normal, normocephalic, atraumatic. Neck: supple, symmetrical, trachea midline  Lungs: diminished breath sounds bilaterally  Heart: S1, S2 normal  Abdomen: abnormal findings:  soft nt  Extremities: edema trace  Neurologic: Mental status: alertness: alert        Assessment and Plan:      IMP:    1.   Acute renal failure from ATN on CKD 3/4   #2 hypertension   #3 type 2 diabetes   #4 hyperkalemia    Plan     1 fu labs today and appear more lucid and interactive  2 plan for dc Thursday  3 bp stable today was low yesterday with therapy  4 ssi monitor  5 fu k today  Will monitor           Marga Leon MD, MD

## 2022-04-12 NOTE — PROGRESS NOTES
Occupational Therapy  Physical Rehabilitation: OCCUPATIONAL THERAPY     [x] daily progress note       [] discharge       Patient Name:  Crispin García   :  1940 MRN: [id number]  Room:  [address] [place] [address] Date of Admission: 3/30/2022  Rehabilitation Diagnosis:   Metabolic encephalopathy [P16.22]  Metabolic encephalopathy [G46.79]       Date 2022       Day of ARU Week:  7   Time IN/OUT 8517-4675   Individual Tx Minutes 60   Group Tx Minutes    Co-Treat Minutes    Concurrent Tx Minutes    TOTAL Tx Time Mins 60   Variance Time    Variance Time []   Refusal due to:     []   Medical hold/reason:    []   Illness   []   Off Unit for test/procedure  []   Extra time needed to complete task  []   Therapeutic need  []   Other (specify):   Restrictions Restrictions/Precautions: General Precautions,Fall Risk,Contact Precautions (suprapubic catheter)         Communication with other providers: [x]   OK to see per nursing:     []   Spoke with team member regarding:      Subjective observations and cognitive status: Received pt in therapy gym from Jared Jung. Pt agreeable to therapy session. Pain level/location:    /10       Location: none    Discharge recommendations  Anticipated discharge date:   Destination: []???home alone   []? ??home alone w assist prn   []? ?? home w/ family    [x]? ?? Continuous supervision       []? ??SNF    []??? Assisted living     []? ?? Other:   Continued therapy: []???[facility] OT  []???OUTPATIENT  OT   []??? No Further OT  Equipment needs: none        UB Dressing: Upper Body Dressing  Assistance Needed: Supervision or touching assistance  Comment: pt able to doff/don T shirt  CARE Score: 4  Discharge Goal: Set-up or clean-up assistance         LB Dressing: Lower Body Dressing  Assistance Needed: Partial/moderate assistance  Comment: Pt able to thread mccrary bag and  BLEs into pants c increased time and verbal cues; min A to manage over hips  CARE Score: 3  Discharge Goal: Supervision or touching assistance    Donning and Morales-Sanchez Footwear: Putting On/Taking Off Footwear  Assistance Needed: Supervision or touching assistance  Comment: Pt able to doff socks using reacher; pt able to don socks with increased time  CARE Score: 4  Discharge Goal: Supervision or touching assistance        Toileting:   Declined need       Toilet Transfers:   NA   Device Used:    []   Standard Toilet         []   Grab Bars           []  Bedside Commode       []   Elevated Toilet          []   Other:        Bed Mobility:           [x]   Pt received out of bed       Transfers:    Sit--> Stand: SBA  Stand --> Sit:   SBA  Stand-Pivot:   NA   Other:    Assistive device required for transfer:   RW      Functional Mobility:    Assistance:  NA   Device:   []   Rolling Walker     []   Standard Walker []   Wheelchair        []   [employer]       []   4-Wheeled Leo Lento         []   Cardiac Walker       []   Other:          Additional Therapeutic activities/exercises completed this date:     [x]   ADL Training: educated pt on LB dressing task c focus on mccrary bag management. Pt demo'd fair carryover requiring 1 verbal cue for mccrary management. [x]   Balance/Postural training     [x]   Bed/Transfer Training   []   Endurance Training   []   Neuromuscular Re-ed   []   Nu-step:  Time:        Level:         #Steps:       []   Rebounder:    []  Seated     []  Standing        []   Supine Ther Ex (reps/sets):     []   Seated Ther Ex (reps/sets):     []   Standing Ther Ex (reps/sets):     []   Other:      Comments:      Patient/Caregiver Education and Training:   []   [employer] Equipment Use  []   Bed Mobility/Transfer Technique/Safety  []   Energy Conservation Tips  []   Family training  []   Postural Awareness  []   Safety During Functional Activities  []   Reinforced Patient's Precautions   []   Progress was updated and reviewed in Rehabtracker with patient and/or family this         date.     Treatment Plan for Next Session: Continue OT POC Assessment: This pt demonstrated a positive response to today's treatment as evidenced by improved LB dressing scores. The patient is making progress toward established goals as evidenced by QI scores. Treatment/Activity Tolerance:   [x] Tolerated treatment with no adverse effects    [] Patient limited by fatigue  [] Patient limited by pain   [] Patient limited by medical complications:    [] Adverse reaction to Tx:   [] Significant change in status    Safety:       []  bed alarm set    [x]  chair alarm set    []  Pt refused alarms                []  Telesitter activated      [x]  Gait belt used during tx session      []other:       Number of Minutes/Billable Intervention  Therapeutic Exercise    ADL Self-care 60   Neuro Re-Ed    Therapeutic Activity    Group    Other:    TOTAL 60       Social History  Social/Functional History  Lives With: Son  Type of Home: House  Home Layout: One level  Home Access: Ramped entrance  Bathroom Shower/Tub: Tub/Shower unit,Shower chair with back  Bathroom Toilet: Standard  Bathroom Equipment: Grab bars in shower,Shower chair  Bathroom Accessibility: Marleen Rasheed accessible (states once he is in bathroom he uses countertops and grab bars, but pt not able to clarify if this is because there is little room or just preference)  Home Equipment: Cane,4 wheeled walker,Rolling walker (pt thinks there may be a BSC from his wife in storage, but unsure)  ADL Assistance: Independent  Homemaking Assistance: Independent  Homemaking Responsibilities: Yes  Meal Prep Responsibility: Secondary  Laundry Responsibility: Primary  Cleaning Responsibility: Secondary  Bill Paying/Finance Responsibility: Primary  Shopping Responsibility: Secondary  Health Care Management: Primary (has pill box with alarms per pt. until recently [address] would set up, but once [facility] stopped he managed on own)  Ambulation Assistance: Independent (mod I with 4ww for up to 150'.  pt used electronic carts at stores when able)  Transfer Assistance: Independent  Active : No  Patient's  Info: 3 sons assist in driving. Mode of Transportation: Car,SUV  Occupation: Retired  Type of occupation: Retired from [address] [place] [address] Street: TV, very little reading, no pets, doctors, son manages groceries,  Meds are managed via pill dispenser that son manages. Pt occasionally writes checks and some auto pay  Additional Comments: pt sleeps in flat bed. Pt has fallen >4 times in past year. Pt attributes many to low BS. No injury    Objective                                                                                    Goals:  (Update in navigator)  Short term goals  Time Frame for Short term goals: STGs=LTGs:  Long term goals  Time Frame for Long term goals : 10-14 days or until d/c  Long term goal 1: Pt will complete grooming tasks Ind seated  Long term goal 2: Pt will complete total body bathing c S  Long term goal 3: Pt will complete UB dressing c setup  Long term goal 4: Pt will complete LB dressing c supervision using AE PRN  Long term goal 5: Pt will doff/don footwear c supervision using AE PRN  Long term goals 6: Pt will complete toileting c S  Long term goal 7: Pt will complete functional transfers (bed, chair, toilet, shower) c DME PRN and S  Long term goal 8: Pt will perform therex/therax to facilitate increased strength/endurance/ax tolerance (c emphasis on dynamic standing balance >8 mins, BUE endurance, cognitive retraining) c SBA:        Plan of Care                                                                              Times per week: 5 days per week for a minimum of 60 minutes/day plus group as appropriate for 60 minutes.   Treatment to include Plan  Times per day: Daily  Current Treatment Recommendations: Strengthening,ROM,Balance Training,Functional Mobility Training,Endurance [employer] Education & Training,Patient/Caregiver Education & Training,Equipment Evaluation, Education, & procurement,Self-Care / ADL,Home Management Training,Cognitive/Perceptual Training    Electronically signed by   KHARI Burton,  4/12/2022, 10:44 AM

## 2022-04-12 NOTE — CARE COORDINATION
LSW met with patient following Care Conference. LSW also spoke with patient's son Jason Funes. LSW informed patient of recommendations for SNF stay to gain more strength for independent living. Patient verbalized understanding and is agreeable to SNF stay. Patient would like to go to either [facility], [facility], or [facility] .      D/C Plan:  Date:  Apr. 14th  DME:  SNF  HHC:  SNF  To:  SNF

## 2022-04-12 NOTE — PROGRESS NOTES
Mobility:           []   Pt received out of bed   Rolling R/L:  SBA  Scooting:  SBA sit scoot to EOB  Supine --> Sit:  SBA   Bed features used: [] Yes    [x] HOB elevated      [x] Bed rail                                    [] No     Transfers:    Sit--> Stand:  Min A first attempt from EOB with pt retro, from R Melvi [address]row 23 CGA  Stand --> Sit:   CGA with cues to reach back   Stand-Pivot:   CGA   Car Transfers:  SBA. Min cues for safety with hand placement out of car  Assistive device required for transfer:   RW    Gait:    Distance:  56'+83'   Assistance:  CGA  Device:  RW  Gait Quality:   slow reciprocal pattern with decreased stride length, flexed knees, able to maintain safe distance within TYSHAWN of RW, occasional cues for correction of posture with fatigue due to cervical, hip and knee flexion    WC Mobility:   Distance: 38'+52'  Assistance: SBA, min cues for obstacle management using B UEs and LEs    Uneven Surfaces:       Assistance:    CGA, demonstrates safety with AD management after cueing  Device:    RW  Surfaces Completed:   [x] Carpet with bean bags beneath       [] Throw rugs          [] Outdoor pavements      [] Grass          [] Loose gravel   [] Carpet      []  Other:       Additional Therapeutic activities/exercises completed this date:     []   Nu-step:  Time:        Level:         #Steps:       []   Rebounder:    []  Seated     []  Standing        [x]   Balance training : Functional standing ax at RW with CG-SBA for pulling up brief        []   Postural training    []   Supine ther ex (reps/sets):     [x]   Seated ther ex (reps/sets): B LE with B UE support x 20 reps AROM for heel raises, marching, mini squats, hip abd, and hip extension with SBA for balance.         []   Standing ther ex (reps/sets):     []   Picked up object from floor                       []   Reacher used   []   Other:   []   Other:   []   Other:      Patient/Caregiver Education and Training:   [x]   Bed Mobility/Transfer technique/safety  [x]   Gait technique/sequencing  [x]   Proper use of assistive device  []   Advanced mobility safety and technique  []   Reinforced patient's precautions with mobility/functional tasks  []   Postural awareness  []   Family training  []   Other:    Treatment Plan for Next Session: gait progression, stair training, LE therx, standing balance/endurance        Treatment/Activity Tolerance:   [x] Tolerated treatment with no adverse effects    [] Patient limited by fatigue  [] Patient limited by pain   [] Patient limited by medical complications:    [] Adverse reaction to Tx:    [] Significant change in status    Safety:       []  bed alarm set    [x]  chair alarm set    []  Pt refused alarms                []  Telesitter activated      [x]  Gait belt used during tx session      []other:         Number of Minutes/Billable Intervention  Gait Training 35   Therapeutic Exercise 20   Neuro Re-Ed    Therapeutic Activity 45   Wheelchair Propulsion 20   Group    Other:    TOTAL 120         Social History  Social/Functional History  Lives With: Son  Type of Home: House  Home Layout: One level  Home Access: Ramped entrance  Bathroom Shower/Tub: Tub/Shower unit,Shower chair with back  Bathroom Toilet: Standard  Bathroom Equipment: Grab bars in shower,Shower chair  Bathroom Accessibility: Ana Lilia Krishnamurthy accessible (states once he is in bathroom he uses countertops and grab bars, but pt not able to clarify if this is because there is little room or just preference)  Home Equipment: [address] walker (pt thinks there may be a BSC from his wife in storage, but unsure)  ADL Assistance: Independent  Homemaking Assistance: Independent  Homemaking Responsibilities: Yes  Meal Prep Responsibility: Secondary  Laundry Responsibility: Primary  Cleaning Responsibility: Secondary  Bill Paying/Finance Responsibility: Primary  Shopping Responsibility: Secondary  Health Care Management: Primary (has pill box with alarms per pt. until recently [facility] would set up, but once [facility] stopped he managed on own)  Ambulation Assistance: Independent (mod I with 4ww for up to 150'. pt used electronic carts at stores when able)  Transfer Assistance: Independent  Active : No  Patient's  Info: 3 sons assist in driving. Mode of Transportation: Car,SUV  Occupation: Retired  Type of occupation: Retired from [address] [place] [address] Street: TV, very little reading, no pets, doctors, son manages groceries,  Meds are managed via pill dispenser that son manages. Pt occasionally writes checks and some auto pay  Additional Comments: pt sleeps in flat bed. Pt has fallen >4 times in past year. Pt attributes many to low BS. No injury    Objective                                                                                    Goals:  (Update in navigator)   : Long term goals  Time Frame for Long term goals : 12-14 days  Long term goal 1: Pt will perform bed mobility with mod I  Long term goal 2: Pt will perform sit to stand and pivot transfers with mod I, car transfers with CGA  Long term goal 3: Pt will ambulate 10' with 2ww with mod I, up to 150' with supervision including uneven surfaces  Long term goal 4: Pt will ascend/descend curb step with 2ww and up to 4 steps with B rails with supervision  Long term goal 5: Pt will  object from floor with 2ww and reacher with supervision:        Plan of Care                                                                              Times per week: 5 days per week for a minimum of 60 minutes/day plus group as appropriate for 60 minutes.   Treatment to include Current Treatment Recommendations: Strengthening,ROM,Balance Training,Functional Mobility Training,Transfer Training,ADL/Self-care Training,Gait Training,Patient/Caregiver Education & Training,IADL Training,Stair training,Equipment Evaluation, Education, & procurement,Neuromuscular Re-education,Pain Management,Home Exercise Program,Positioning,Endurance Training,Safety Education & Training,Cognitive/Perceptual Training,Cognitive Reorientation    Electronically signed by   Ming Rosa, PTA #[id number]  4/12/2022, 8:47 AM

## 2022-04-13 LAB
GLUCOSE BLD-MCNC: 115 MG/DL (ref 70–99)
GLUCOSE BLD-MCNC: 120 MG/DL (ref 70–99)
GLUCOSE BLD-MCNC: 169 MG/DL (ref 70–99)
GLUCOSE BLD-MCNC: 173 MG/DL (ref 70–99)
GLUCOSE BLD-MCNC: 178 MG/DL (ref 70–99)

## 2022-04-13 PROCEDURE — 6370000000 HC RX 637 (ALT 250 FOR IP): Performed by: INTERNAL MEDICINE

## 2022-04-13 PROCEDURE — 97535 SELF CARE MNGMENT TRAINING: CPT

## 2022-04-13 PROCEDURE — 6360000002 HC RX W HCPCS: Performed by: INTERNAL MEDICINE

## 2022-04-13 PROCEDURE — 1280000000 HC REHAB R&B

## 2022-04-13 PROCEDURE — 99232 SBSQ HOSP IP/OBS MODERATE 35: CPT | Performed by: PHYSICAL MEDICINE & REHABILITATION

## 2022-04-13 PROCEDURE — 94761 N-INVAS EAR/PLS OXIMETRY MLT: CPT

## 2022-04-13 PROCEDURE — 6370000000 HC RX 637 (ALT 250 FOR IP): Performed by: PHYSICAL MEDICINE & REHABILITATION

## 2022-04-13 PROCEDURE — 97116 GAIT TRAINING THERAPY: CPT

## 2022-04-13 PROCEDURE — 2500000003 HC RX 250 WO HCPCS: Performed by: PHYSICAL MEDICINE & REHABILITATION

## 2022-04-13 PROCEDURE — 0T2BX0Z CHANGE DRAINAGE DEVICE IN BLADDER, EXTERNAL APPROACH: ICD-10-PCS | Performed by: PHYSICIAN ASSISTANT

## 2022-04-13 PROCEDURE — 97530 THERAPEUTIC ACTIVITIES: CPT

## 2022-04-13 PROCEDURE — 82962 GLUCOSE BLOOD TEST: CPT

## 2022-04-13 PROCEDURE — 94150 VITAL CAPACITY TEST: CPT

## 2022-04-13 PROCEDURE — 97542 WHEELCHAIR MNGMENT TRAINING: CPT

## 2022-04-13 RX ADMIN — CARVEDILOL 3.12 MG: 6.25 TABLET, FILM COATED ORAL at 09:55

## 2022-04-13 RX ADMIN — ALOGLIPTIN 6.25 MG: 6.25 TABLET, FILM COATED ORAL at 09:55

## 2022-04-13 RX ADMIN — MICONAZOLE NITRATE: 2 POWDER TOPICAL at 22:00

## 2022-04-13 RX ADMIN — GLIPIZIDE 10 MG: 5 TABLET ORAL at 09:56

## 2022-04-13 RX ADMIN — SODIUM BICARBONATE 650 MG: 650 TABLET ORAL at 09:55

## 2022-04-13 RX ADMIN — HEPARIN SODIUM 5000 UNITS: 5000 INJECTION INTRAVENOUS; SUBCUTANEOUS at 21:59

## 2022-04-13 RX ADMIN — ATORVASTATIN CALCIUM 40 MG: 40 TABLET, FILM COATED ORAL at 21:59

## 2022-04-13 RX ADMIN — TRAMADOL HYDROCHLORIDE 25 MG: 50 TABLET, COATED ORAL at 21:59

## 2022-04-13 RX ADMIN — ALLOPURINOL 100 MG: 100 TABLET ORAL at 09:56

## 2022-04-13 RX ADMIN — SODIUM BICARBONATE 650 MG: 650 TABLET ORAL at 21:59

## 2022-04-13 RX ADMIN — HEPARIN SODIUM 5000 UNITS: 5000 INJECTION INTRAVENOUS; SUBCUTANEOUS at 05:23

## 2022-04-13 RX ADMIN — ASPIRIN 81 MG 81 MG: 81 TABLET ORAL at 09:55

## 2022-04-13 RX ADMIN — PREGABALIN 50 MG: 50 CAPSULE ORAL at 21:59

## 2022-04-13 RX ADMIN — HEPARIN SODIUM 5000 UNITS: 5000 INJECTION INTRAVENOUS; SUBCUTANEOUS at 15:11

## 2022-04-13 RX ADMIN — POLYETHYLENE GLYCOL (3350) 17 G: 17 POWDER, FOR SOLUTION ORAL at 05:24

## 2022-04-13 RX ADMIN — SODIUM BICARBONATE 650 MG: 650 TABLET ORAL at 15:12

## 2022-04-13 RX ADMIN — CARVEDILOL 3.12 MG: 6.25 TABLET, FILM COATED ORAL at 17:19

## 2022-04-13 RX ADMIN — MICONAZOLE NITRATE: 2 POWDER TOPICAL at 14:30

## 2022-04-13 RX ADMIN — INSULIN LISPRO 2 UNITS: 100 INJECTION, SOLUTION INTRAVENOUS; SUBCUTANEOUS at 22:02

## 2022-04-13 RX ADMIN — ACETAMINOPHEN 650 MG: 325 TABLET ORAL at 05:23

## 2022-04-13 RX ADMIN — LEVOTHYROXINE SODIUM 50 MCG: 0.05 TABLET ORAL at 05:23

## 2022-04-13 RX ADMIN — PREGABALIN 50 MG: 50 CAPSULE ORAL at 09:56

## 2022-04-13 RX ADMIN — INSULIN GLARGINE 10 UNITS: 100 INJECTION, SOLUTION SUBCUTANEOUS at 09:57

## 2022-04-13 ASSESSMENT — PAIN DESCRIPTION - ORIENTATION: ORIENTATION: LEFT

## 2022-04-13 ASSESSMENT — PAIN SCALES - GENERAL
PAINLEVEL_OUTOF10: 0
PAINLEVEL_OUTOF10: 7
PAINLEVEL_OUTOF10: 4
PAINLEVEL_OUTOF10: 2

## 2022-04-13 ASSESSMENT — PAIN DESCRIPTION - DESCRIPTORS: DESCRIPTORS: DISCOMFORT;ACHING

## 2022-04-13 ASSESSMENT — PAIN - FUNCTIONAL ASSESSMENT: PAIN_FUNCTIONAL_ASSESSMENT: PREVENTS OR INTERFERES SOME ACTIVE ACTIVITIES AND ADLS

## 2022-04-13 ASSESSMENT — PAIN DESCRIPTION - PAIN TYPE: TYPE: ACUTE PAIN

## 2022-04-13 ASSESSMENT — PAIN DESCRIPTION - LOCATION: LOCATION: HIP

## 2022-04-13 ASSESSMENT — PAIN DESCRIPTION - FREQUENCY: FREQUENCY: INTERMITTENT

## 2022-04-13 ASSESSMENT — PAIN DESCRIPTION - PROGRESSION: CLINICAL_PROGRESSION: GRADUALLY IMPROVING

## 2022-04-13 ASSESSMENT — PAIN DESCRIPTION - ONSET: ONSET: ON-GOING

## 2022-04-13 NOTE — PROGRESS NOTES
Progress Note( Dr. Benjie Melendrez)  4/13/2022  Subjective:   Admit Date: 3/30/2022  PCP: Pacheco Smith MD    Admitted For : Patient was initially admitted to medical floor for sepsis from UTI and generalized  weakness    Consulted For: Better control of blood glucose    Interval History: Patient was transferred from medical floor on 3/30/2022 and was admitted to acute rehab unit for rehabilitation and physical therapy  Blood sugar control is better    Patient seem to be more awake and alert  Somewhat  seem to be confused    Denies any chest pains,   Denies SOB . Denies nausea or vomiting. No new bowel or bladder symptoms. Intake/Output Summary (Last 24 hours) at 4/13/2022 1840  Last data filed at 4/13/2022 0743  Gross per 24 hour   Intake 360 ml   Output 427 ml   Net -67 ml       DATA    CBC:   No results for input(s): WBC, HGB, PLT in the last 72 hours. CMP:  Recent Labs     04/12/22  0658      K 4.6      CO2 23   BUN 60*   CREATININE 2.0*   CALCIUM 9.1   LABALBU 3.2*     Lipids: No results found for: CHOL, HDL, TRIG  Glucose:  Recent Labs     04/13/22  0743 04/13/22  1207 04/13/22  1633   POCGLU 115* 169* 178*     [id number]:  Lab Results   Component Value Date    LABA1C 9.2 03/22/2022     High Sensitivity TSH:   Lab Results   Component Value Date    TSHHS 6.190 03/25/2022     Free T3: No results found for: FT3  Free T4:  Lab Results   Component Value Date    T4FREE 1.15 03/27/2022       XR CHEST PORTABLE   Final Result   No acute cardiopulmonary abnormality. Favor mild soft tissue prominence over the left lung base less likely trace   pleural effusion or consolidation. Attention with a follow-up lateral view   if clinically indicated.               Scheduled Medicines   Medications:    insulin glargine  10 Units SubCUTAneous QAM    glipiZIDE  10 mg Oral BID AC    insulin lispro  0-9 Units SubCUTAneous Nightly    sodium bicarbonate  650 mg Oral TID    insulin lispro  0-18 Units SubCUTAneous TID WC    miconazole   Topical BID    heparin (porcine)  5,000 Units SubCUTAneous 3 times per day    allopurinol  100 mg Oral Daily    alogliptin  6.25 mg Oral Daily    aspirin  81 mg Oral Daily    atorvastatin  40 mg Oral Nightly    carvedilol  3.125 mg Oral BID WC    levothyroxine  50 mcg Oral Daily    pregabalin  50 mg Oral BID      Infusions:    dextrose           Objective:   Vitals: BP (!) 143/60   Pulse 84   Temp 97.9 °F (36.6 °C) (Oral)   Resp 16   Ht 5' 8\" (1.727 m)   Wt 199 lb 15.3 oz (90.7 kg)   SpO2 100%   BMI 30.40 kg/m²   General appearance: alert and cooperative with exam  Neck: no JVD or bruit  Thyroid : Normal lobes   Lungs: Has Vesicular Breath sounds   Heart:  regular rate and rhythm  Abdomen: soft, non-tender; bowel sounds normal; no masses,  no organomegaly has Surapubic Catheter   Musculoskeletal: Normal  Extremities: extremities normal, , no edema  Neurologic:  Awake, alert, oriented to name, place and time. Cranial nerves II-XII are grossly intact. Motor weakness . Sensory is intact. ,  and gait is abnormal.unstable     Assessment:     Patient Active Problem List:     Gait disturbance     Generalized weakness     Diabetes mellitus (HCC)     Osteoarthritis     Anemia of chronic disorder     Infected implanted bladder sphincter cuff     Escherichia coli urinary tract infection     Hypertension     Sepsis ([address]     Type 2 diabetes mellitus with hypoglycemia without coma ([address]     UTI (urinary tract infection) due to urinary indwelling catheter (HCC)     Hyperkalemia     Acute kidney failure (HCC)     Leg weakness, bilateral     Type 2 diabetes mellitus with neurological manifestations, uncontrolled ([address]     Complicated UTI (urinary tract infection)     Essential hypertension     Severe muscle deconditioning     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 (HCC)     Frequent falls     Chronic kidney disease, stage 3a ([address]     Uncontrolled type 2 diabetes mellitus with peripheral neuropathy (HCC)     Prostate cancer ([facility]     Suprapubic catheter ([facility]     Sepsis secondary to UTI ([facility]      Plan:     1. Reviewed POC blood glucose . Labs and X ray results   2. Reviewed Current Medicines   3. On m Correction bolus Humalog/ Basal Lantus Insulin. 4. Monitor Blood glucose frequently   5. Modified  the dose of Insulin/ other medicines as needed  6. On low-dose Synthroid of 50 mg/day  7. Patient was transferred to acute rehab unit on 3/30//2022   8. Patient to be participating in physical therapy and Occupational Therapy of acute rehab unit  9. Will follow     .      Ibrahima Anderson MD, MD

## 2022-04-13 NOTE — PROGRESS NOTES
Progress Note( Dr. Kye Kohler)  4/12/2022  Subjective:   Admit Date: 3/30/2022  PCP: Patricia Jay MD    Admitted For : Patient was initially admitted to medical floor for sepsis from UTI and generalized  weakness    Consulted For: Better control of blood glucose    Interval History: Patient was transferred from medical floor on 3/30/2022 and was admitted to acute rehab unit for rehabilitation and physical therapy  Blood sugar control is better    Patient seem to be more awake and alert  Somewhat  seem to be confused    Denies any chest pains,   Denies SOB . Denies nausea or vomiting. No new bowel or bladder symptoms. Intake/Output Summary (Last 24 hours) at 4/12/2022 2339  Last data filed at 4/12/2022 1733  Gross per 24 hour   Intake 477 ml   Output 1650 ml   Net -1173 ml       DATA    CBC:   No results for input(s): WBC, HGB, PLT in the last 72 hours. CMP:  Recent Labs     04/12/22  0658      K 4.6      CO2 23   BUN 60*   CREATININE 2.0*   CALCIUM 9.1   LABALBU 3.2*     Lipids: No results found for: CHOL, HDL, TRIG  Glucose:  Recent Labs     04/12/22  1207 04/12/22  1717 04/12/22  2114   POCGLU 190* 197* 162*     [id number]:  Lab Results   Component Value Date    LABA1C 9.2 03/22/2022     High Sensitivity TSH:   Lab Results   Component Value Date    TSHHS 6.190 03/25/2022     Free T3: No results found for: FT3  Free T4:  Lab Results   Component Value Date    T4FREE 1.15 03/27/2022       XR CHEST PORTABLE   Final Result   No acute cardiopulmonary abnormality. Favor mild soft tissue prominence over the left lung base less likely trace   pleural effusion or consolidation. Attention with a follow-up lateral view   if clinically indicated.               Scheduled Medicines   Medications:    insulin glargine  10 Units SubCUTAneous QAM    glipiZIDE  10 mg Oral BID AC    insulin lispro  0-9 Units SubCUTAneous Nightly    sodium bicarbonate  650 mg Oral TID    insulin lispro  0-18 Units SubCUTAneous TID WC    miconazole   Topical BID    heparin (porcine)  5,000 Units SubCUTAneous 3 times per day    allopurinol  100 mg Oral Daily    alogliptin  6.25 mg Oral Daily    aspirin  81 mg Oral Daily    atorvastatin  40 mg Oral Nightly    carvedilol  3.125 mg Oral BID WC    levothyroxine  50 mcg Oral Daily    pregabalin  50 mg Oral BID      Infusions:    dextrose           Objective:   Vitals: /75   Pulse 77   Temp 98.1 °F (36.7 °C) (Oral)   Resp 17   Ht 5' 8\" (1.727 m)   Wt 194 lb 7.1 oz (88.2 kg)   SpO2 100%   BMI 29.57 kg/m²   General appearance: alert and cooperative with exam  Neck: no JVD or bruit  Thyroid : Normal lobes   Lungs: Has Vesicular Breath sounds   Heart:  regular rate and rhythm  Abdomen: soft, non-tender; bowel sounds normal; no masses,  no organomegaly has Surapubic Catheter   Musculoskeletal: Normal  Extremities: extremities normal, , no edema  Neurologic:  Awake, alert, oriented to name, place and time. Cranial nerves II-XII are grossly intact. Motor weakness . Sensory is intact. ,  and gait is abnormal.unstable     Assessment:     Patient Active Problem List:     Gait disturbance     Generalized weakness     Diabetes mellitus (HCC)     Osteoarthritis     Anemia of chronic disorder     Infected implanted bladder sphincter cuff     Escherichia coli urinary tract infection     Hypertension     Sepsis ([address]     Type 2 diabetes mellitus with hypoglycemia without coma ([address]     UTI (urinary tract infection) due to urinary indwelling catheter (HCC)     Hyperkalemia     Acute kidney failure (HCC)     Leg weakness, bilateral     Type 2 diabetes mellitus with neurological manifestations, uncontrolled ([address]     Complicated UTI (urinary tract infection)     Essential hypertension     Severe muscle deconditioning     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 (HCC)     Frequent falls     Chronic kidney disease, stage 3a ([address]     Uncontrolled type 2 diabetes mellitus with peripheral neuropathy (HCC)     Prostate cancer ([facility]     Suprapubic catheter ([facility]     Sepsis secondary to UTI ([facility]      Plan:     1. Reviewed POC blood glucose . Labs and X ray results   2. Reviewed Current Medicines   3. On m Correction bolus Humalog/ Basal Lantus Insulin. 4. Monitor Blood glucose frequently   5. Modified  the dose of Insulin/ other medicines as needed  6. On low-dose Synthroid of 50 mg/day  7. Patient was transferred to acute rehab unit on 3/30//2022   8. Patient to be participating in physical therapy and Occupational Therapy of acute rehab unit  9. Will follow     .      Inna Quijano MD, MD

## 2022-04-13 NOTE — PROGRESS NOTES
Nephrology Progress Note  4/13/2022 10:46 AM        Subjective:   Admit Date: 3/30/2022  PCP: Paramjit Gutierrez MD    Interval History: Events for the last 10 days, while hours:, Briefly reviewed  Apparently has chronic indwelling catheter malfunction need replacement    Diet: Reasonable per patient    ROS: No overt shortness of breath, no confusion, no PND or orthopnea  Urine output recorded only 877 cc for the last 24 hours  No fever recorded blood pressure variable number, perhaps need manual confirmation and accurate blood pressure measurement    Data:     Current meds:    insulin glargine  10 Units SubCUTAneous QAM    glipiZIDE  10 mg Oral BID AC    insulin lispro  0-9 Units SubCUTAneous Nightly    sodium bicarbonate  650 mg Oral TID    insulin lispro  0-18 Units SubCUTAneous TID WC    miconazole   Topical BID    heparin (porcine)  5,000 Units SubCUTAneous 3 times per day    allopurinol  100 mg Oral Daily    alogliptin  6.25 mg Oral Daily    aspirin  81 mg Oral Daily    atorvastatin  40 mg Oral Nightly    carvedilol  3.125 mg Oral BID WC    levothyroxine  50 mcg Oral Daily    pregabalin  50 mg Oral BID      dextrose           I/O last 3 completed shifts: In: 053 [P.O.:597]  Out: 2077 [Urine:2076; Stool:1]    CBC: No results for input(s): WBC, HGB, PLT in the last 72 hours.        Recent Labs     04/12/22  0658      K 4.6      CO2 23   BUN 60*   CREATININE 2.0*   GLUCOSE 140*       Lab Results   Component Value Date    CALCIUM 9.1 04/12/2022    PHOS 5.3 (H) 04/12/2022       Objective:     Vitals: BP (!) 135/55   Pulse 74   Temp 98.2 °F (36.8 °C) (Oral)   Resp 18   Ht 5' 8\" (1.727 m)   Wt 199 lb 15.3 oz (90.7 kg)   SpO2 99%   BMI 30.40 kg/m²     General appearance: Alert, awake and oriented  HEENT: At least 1+ conjunctival pallor  Neck: Supple  Lungs: No gross crackles on auscultation  Heart: Seems regular rate and rhythm  Abdomen: Soft, nontender  Extremities: Minimal leg edema      Problem List :         Impression :     1. Acute kidney injury with underlying CKD stage IIIb/IV A3-creatinine was stable -he does have chronic indwelling catheter as well as left ureteral stent, which was exchanged recently and has an infection before  2. Multiple comorbid chronic disease, which include but not limited to, hypertension, diabetes, dysrhythmia, proteinuria more than 1 g as well as multifactoral anemia    Recommendation/Plan  :     1. Need good manual blood pressure measurement bilaterally-goal 130/80-good glycemic control-cardiorenal protective medication, most of them can be done as an outpatient  2.  Otherwise follow clinically-likely will be discharged soon-we will closely follow him up in a week or 2 after his discharge      Sandra Zuñiga MD MD

## 2022-04-13 NOTE — PROGRESS NOTES
Occupational Therapy    Physical Rehabilitation: OCCUPATIONAL THERAPY     [x] daily progress note       [] discharge       Patient Name:  Ely Mahoney   :  1940 MRN: [id number]  Room:  [address] [place] [address] Date of Admission: 3/30/2022  Rehabilitation Diagnosis:   Metabolic encephalopathy [Y39.43]  Metabolic encephalopathy [P61.11]       Date 2022       Day of ARU Week:  1   Time IN/OUT 9582-1417   Individual Tx Minutes 80   Group Tx Minutes    Co-Treat Minutes    Concurrent Tx Minutes    TOTAL Tx Time Mins 82   Variance Time    Variance Time []   Refusal due to:     []   Medical hold/reason:    []   Illness   []   Off Unit for test/procedure  []   Extra time needed to complete task  []   Therapeutic need  []   Other (specify):   Restrictions Restrictions/Precautions: General Precautions,Fall Risk,Contact Precautions (suprapubic catheter)         Communication with other providers: [x]   OK to see per nursing:     []   Spoke with team member regarding:      Subjective observations and cognitive status: Pt resting in bed on approach; pleasant and agreeable to therapy. Pt stated Jamin Atkins are coming to change my superpubic catheter this morning. \" During the session, urology entered room to change catheter. Pain level/location:    /10       Location:    Discharge recommendations  Anticipated discharge date:   Destination: []? ???home alone   []? ???home alone w assist prn   []???? home w/ family    []???? Continuous supervision       [x]????SNF    []???? Assisted living     []???? Other:   Continued therapy: []????[facility] OT  []????OUTPATIENT  OT   []???? No Further OT  Equipment needs: none        ADLs:    Eating: Eating  Assistance Needed: Independent  Comment: assist to open packages/containers  CARE Score: 6  Discharge Goal: Independent       Oral Hygiene: Oral Hygiene  Assistance Needed: Independent  Comment: seated at sink  CARE Score: 6  Discharge Goal: Independent    UB/LB Bathing: Shower/Bathe Self  Assistance Needed: Supervision or touching assistance  Comment: pt able to bathe UEs, chest, abdomen, thighs, perineal area and distal LEs; CGA in stance to bathe bottom  CARE Score: 4  Discharge Goal: Supervision or touching assistance    UB Dressing: Upper Body Dressing  Assistance Needed: Setup or clean-up assistance  Comment: Pt able to doff/don T shirt  CARE Score: 5  Discharge Goal: Set-up or clean-up assistance         LB Dressing: Lower Body Dressing  Assistance Needed: Partial/moderate assistance  Comment: Pt able to trhead mccrary bag and BLEs intp pants and brief c increased time and verbal cues; assist to manage over hips  CARE Score: 3  Discharge Goal: Supervision or touching assistance    Donning and [place] Footwear: Putting On/Taking Off Footwear  Assistance Needed: Setup or clean-up assistance  Comment: Pt able to doff socks using reacher; S/U to don socks using sock aid  CARE Score: 5  Discharge Goal: Supervision or touching assistance      Toileting: Toileting Hygiene  Assistance Needed: Supervision or touching assistance  Comment: CGA for clothing management; pt did not have BM  CARE Score: 4  Discharge Goal: Supervision or touching assistance      Toilet Transfers:   CGA Toilet Transfer  Assistance Needed: Supervision or touching assistance  Comment: CGA c use of RW and grab bars  CARE Score: 4  Discharge Goal: Supervision or touching assistance  Device Used:    []   Standard Toilet         []   Grab Bars           []  Bedside Commode       []   Elevated Toilet          []   Other:        Bed Mobility:           []   Pt received out of bed   Supine --> Sit:  SBA using bed features c cues   Sit --> Supine:   Mod A for LEs     Transfers:    Sit--> Stand:  Ranging from CG/min A d/t fatigue   Stand --> Sit:   CG/min A cues for controlled descent   Stand-Pivot:   CGA   Other:    Assistive device required for transfer:   RW       Functional Mobility:  To<>from bathroom  Assistance:  CGA   Device: [x]   Rolling Walker     []   Standard [place] []   Wheelchair        []   Wheeler beach       []   4-Wheeled Fl[place]         []   Cardiac Fl[place]ita [place]       []   Other:        Additional Therapeutic activities/exercises completed this date:     [x]   ADL Training   [x]   Balance/Postural training     [x]   Bed/Transfer Training   []   Endurance Training   []   Neuromuscular Re-ed   []   Nu-step:  Time:        Level:         #Steps:       []   Rebounder:    []  Seated     []  Standing        []   Supine Ther Ex (reps/sets):     []   Seated Ther Ex (reps/sets):     []   Standing Ther Ex (reps/sets):     []   Other:      Comments:      Patient/Caregiver Education and Training:   []   [employer] Equipment Use  []   Bed Mobility/Transfer Technique/Safety  []   Energy Conservation Tips  []   Family training  []   Postural Awareness  []   Safety During Functional Activities  []   Reinforced Patient's Precautions   []   Progress was updated and reviewed in Rehabtracker with patient and/or family this         date.     Treatment Plan for Next Session: D/C 4/14/22          Treatment/Activity Tolerance:   [x] Tolerated treatment with no adverse effects    [] Patient limited by fatigue  [] Patient limited by pain   [] Patient limited by medical complications:    [] Adverse reaction to Tx:   [] Significant change in status    Safety:       []  bed alarm set    [x]  chair alarm set    []  Pt refused alarms                []  Telesitter activated      [x]  Gait belt used during tx session      []other:       Number of Minutes/Billable Intervention  Therapeutic Exercise    ADL Self-care 82   Neuro Re-Ed    Therapeutic Activity    Group    Other:    TOTAL 82       Social History  Social/Functional History  Lives With: Son  Type of Home: House  Home Layout: One level  Home Access: Ramped entrance  Bathroom Shower/Tub: Tub/Shower unit,Shower chair with back  Bathroom Toilet: Standard  Bathroom Equipment: Grab bars in shower,Shower chair  Bathroom Accessibility: [place] accessible (states once he is in bathroom he uses countertops and grab bars, but pt not able to clarify if this is because there is little room or just preference)  Home Equipment: Cane,4 wheeled walker,Rolling walker (pt thinks there may be a BSC from his wife in storage, but unsure)  ADL Assistance: Independent  Homemaking Assistance: Independent  Homemaking Responsibilities: Yes  Meal Prep Responsibility: Secondary  Laundry Responsibility: Primary  Cleaning Responsibility: Secondary  Bill Paying/Finance Responsibility: Primary  Shopping Responsibility: Secondary  Health Care Management: Primary (has pill box with alarms per pt. until recently [address] would set up, but once [address] 76 stopped he managed on own)  Ambulation Assistance: Independent (mod I with 4ww for up to 150'. pt used electronic carts at stores when able)  Transfer Assistance: Independent  Active : No  Patient's  Info: 3 sons assist in driving. Mode of Transportation: Car,SUV  Occupation: Retired  Type of occupation: Retired from [address] [place] [address] Street: TV, very little reading, no pets, doctors, son manages groceries,  Meds are managed via pill dispenser that son manages. Pt occasionally writes checks and some auto pay  Additional Comments: pt sleeps in flat bed. Pt has fallen >4 times in past year. Pt attributes many to low BS.  No injury    Objective                                                                                    Goals:  (Update in navigator)  Short term goals  Time Frame for Short term goals: STGs=LTGs:  Long term goals  Time Frame for Long term goals : 10-14 days or until d/c  Long term goal 1: Pt will complete grooming tasks Ind seated- met   Long term goal 2: Pt will complete total body bathing c S-  met   Long term goal 3: Pt will complete UB dressing c setup- met   Long term goal 4: Pt will complete LB dressing c supervision using AE PRN- not met   Long term goal 5: Pt will doff/don footwear c supervision using AE PRN- met   Long term goals 6: Pt will complete toileting c S- met   Long term goal 7: Pt will complete functional transfers (bed, chair, toilet, shower) c DME PRN and S- not met   Long term goal 8: Pt will perform therex/therax to facilitate increased strength/endurance/ax tolerance (c emphasis on dynamic standing balance >8 mins, BUE endurance, cognitive retraining) c SBA:    Not met     Plan of Care                                                                              Times per week: 5 days per week for a minimum of 60 minutes/day plus group as appropriate for 60 minutes.   Treatment to include Plan  Times per day: Daily  Current Treatment Recommendations: Mariela Glez Mobility Training,Endurance [employer] Management,Safety Education & Training,Patient/Caregiver Education & Training,Equipment Evaluation, Education, & procurement,Self-Care / ADL,Home Management Training,Cognitive/Perceptual Training    Electronically signed by   KHARI Carvalho,  4/13/2022, 10:57 AM

## 2022-04-13 NOTE — PROGRESS NOTES
Score: 4  Discharge Goal: Set-up or clean-up assistance         LB Dressing: Lower Body Dressing  Assistance Needed: Partial/moderate assistance  Comment: Pt able to thread mccrary bag and  BLEs into pants c increased time and verbal cues; min A to manage over hips  CARE Score: 3  Discharge Goal: Supervision or touching assistance    Donning and [address] Footwear: Putting On/Taking Off Footwear  Assistance Needed: Supervision or touching assistance  Comment: Pt able to doff socks using reacher; pt able to don socks with increased time  CARE Score: 4  Discharge Goal: Supervision or touching assistance      Toileting: Toileting Hygiene  Assistance Needed: Substantial/maximal assistance  Comment: able to manage depends down, required assist for BM hygiene and pants management for balance  CARE Score: 2  Discharge Goal: Supervision or touching assistance      Toilet Transfers:   Toilet Transfer  Assistance Needed: Partial/moderate assistance  Comment: required Mod A to<>from WC c max cues to fully turn  CARE Score: 3  Discharge Goal: Supervision or touching assistance    Physical Therapy:         Long term goals  Time Frame for Long term goals : 12-14 days  Long term goal 1: Pt will perform bed mobility with mod I  Long term goal 2: Pt will perform sit to stand and pivot transfers with mod I, car transfers with CGA  Long term goal 3: Pt will ambulate 10' with 2ww with mod I, up to 150' with supervision including uneven surfaces  Long term goal 4: Pt will ascend/descend curb step with 2ww and up to 4 steps with B rails with supervision  Long term goal 5: Pt will  object from floor with 2ww and reacher with supervision      Bed Mobility:   Sit to Lying  Assistance Needed: Substantial/maximal assistance  Comment: max assist for B LE, improved control of trunk  CARE Score: 2  Discharge Goal: Independent  Roll Left and Right  Assistance Needed: Substantial/maximal assistance  Comment: max assist with rails  CARE Score: 2  Discharge Goal: Independent  Lying to Sitting on Side of Bed  Assistance Needed: Partial/moderate assistance  Comment: min-mod A with bed features, max time and cues;  Req A /cues for catheter management/awareness prior to transferring opposite direction OOB  CARE Score: 3  Discharge Goal: Independent    Transfers:    Sit to Stand  Assistance Needed: Partial/moderate assistance  Comment: Min -CGA, initially retro req cues for COG fwd, A with catheter managment onto RW prior to standing  Reason if not Attempted: Not attempted due to medical condition or safety concerns  CARE Score: 3  Discharge Goal: Independent  Chair/Bed-to-Chair Transfer  Assistance Needed: Supervision or touching assistance  Comment: CGA for balance using RW  Reason if not Attempted: Not attempted due to medical condition or safety concerns  CARE Score: 4  Discharge Goal: Independent  Toilet Transfer  Assistance Needed: Substantial/maximal assistance  Comment: max assist and max verbal cues for techique, posture, safety  CARE Score: 2  Car Transfer  Assistance Needed: Partial/moderate assistance  Comment: mod assist for trunk getting out of car  Reason if not Attempted: Not attempted due to medical condition or safety concerns  CARE Score: 3  Discharge Goal: Supervision or touching assistance    Ambulation:    Walking Ability  Does the Patient Walk?: Yes     Walk 10 Feet  Assistance Needed: Supervision or touching assistance  Comment: CGA with RW, flexed posture  Reason if not Attempted: Not attempted due to medical condition or safety concerns  CARE Score: 4  Discharge Goal: Independent     Walk 50 Feet with Two Turns  Comment: 27' max distance due to orthostatic hypotension episode, pt symptomatic  Reason if not Attempted: Not attempted due to medical condition or safety concerns  CARE Score: 88  Discharge Goal: Supervision or touching assistance     Walk 150 Feet  Reason if not Attempted: Not attempted due to medical condition or safety concerns  CARE Score: 88  Discharge Goal: Supervision or touching assistance     Walking 10 Feet on Uneven Surfaces  Assistance Needed: Partial/moderate assistance  Comment: min assist with 2ww  Reason if not Attempted: Not attempted due to medical condition or safety concerns  CARE Score: 3  Discharge Goal: Supervision or touching assistance     1 Step (Curb)  Assistance Needed: Partial/moderate assistance  Comment: mod assist on 4\" step  Reason if not Attempted: Not attempted due to medical condition or safety concerns  CARE Score: 3  Discharge Goal: Supervision or touching assistance     4 Steps  Reason if not Attempted: Not attempted due to medical condition or safety concerns  CARE Score: 88  Discharge Goal: Supervision or touching assistance     12 Steps  Reason if not Attempted: Not applicable  CARE Score: 9  Discharge Goal: Not Applicable       Wheelchair:  w/c Ability: Wheelchair Ability  Uses a Wheelchair and/or Scooter?: No  Wheel 50 Feet with Two Turns  Assistance Needed: Partial/moderate assistance  Comment: 68' max distance with min A to SBA due for obstacle clearnce  CARE Score: 3  Wheel 150 Feet  Assistance Needed: Partial/moderate assistance  Comment: 68' max distance  CARE Score: 3          Balance:        Object: Picking Up Object  Assistance Needed: Substantial/maximal assistance  Comment: started with mod assist as he took off his L hand(due to leaning to R), and able to grasp object, then as pt bringing it up, lost balance posterior and needed max assist to recover  Reason if not Attempted: Not attempted due to medical condition or safety concerns  CARE Score: 2  Discharge Goal: Supervision or touching assistance    I      Exam:    Blood pressure 122/75, pulse 77, temperature 98.1 °F (36.7 °C), temperature source Oral, resp. rate 17, height 5' 8\" (1.727 m), weight 194 lb 7.1 oz (88.2 kg), SpO2 100 %.     General: ***    HEENT: ***    Pulmonary: ***    Cardiac: ***    Abdomen: Patient's abdomen is soft and nondistended. Bowel sounds were present throughout. There was no rebound, guarding or masses noted. Upper extremities: ***    Lower extremities: ***    Sitting balance was ***. Standing balance was ***. Lab Results   Component Value Date    WBC 10.2 04/09/2022    HGB 9.5 (L) 04/09/2022    HCT 31.4 (L) 04/09/2022    MCV 98.1 04/09/2022     04/09/2022     Lab Results   Component Value Date    INR 1.20 03/17/2022    INR 0.99 05/19/2015    INR 1.31 03/23/2013    PROTIME 15.5 (H) 03/17/2022    PROTIME 11.3 05/19/2015    PROTIME 14.2 03/23/2013     Lab Results   Component Value Date    CREATININE 2.0 (H) 04/12/2022    BUN 60 (H) 04/12/2022     04/12/2022    K 4.6 04/12/2022     04/12/2022    CO2 23 04/12/2022     Lab Results   Component Value Date    ALT 12 04/04/2022    AST 14 (L) 04/04/2022    ALKPHOS 108 04/04/2022    BILITOT 0.2 04/04/2022       Expected length of stay  prior to a supervised level of function for discharge home with a walker and C OT/PT is ***    Recommendations:    ***    Counseling and Coordination of Care: In care conference today I met with the patient's OT, PT, RN and . We discussed the patient's problems, progress and prognosis. Disposition issues were clarified and plans were established for ongoing rehabilitation efforts beyond the ARU stay. I reviewed this information with the patient during a second distinct visit with the patient. More than half of the total time of 35 minutes spent with the patient involved counseling and coordination of care.

## 2022-04-13 NOTE — CONSULTS
removal of artificial sphinter and placement of suprapubic catheter.  PROSTATE SURGERY      PROSTATECTOMY  1996    infection     Current Medications:   Current Facility-Administered Medications: insulin glargine (LANTUS) injection vial 10 Units, 10 Units, SubCUTAneous, QAM  glipiZIDE (GLUCOTROL) tablet 10 mg, 10 mg, Oral, BID AC  insulin lispro (HUMALOG) injection vial 0-9 Units, 0-9 Units, SubCUTAneous, Nightly  sodium bicarbonate tablet 650 mg, 650 mg, Oral, TID  melatonin tablet 3 mg, 3 mg, Oral, Nightly PRN  insulin lispro (HUMALOG) injection vial 0-18 Units, 0-18 Units, SubCUTAneous, TID WC  traMADol (ULTRAM) tablet 25 mg, 25 mg, Oral, Q6H PRN  miconazole (MICOTIN) 2 % powder, , Topical, BID  heparin (porcine) injection 5,000 Units, 5,000 Units, SubCUTAneous, 3 times per day  sodium chloride flush 0.9 % injection 5-40 mL, 5-40 mL, IntraVENous, PRN  dextrose 5 % solution, 100 mL/hr, IntraVENous, PRN  glucagon (rDNA) injection 1 mg, 1 mg, IntraMUSCular, PRN  allopurinol (ZYLOPRIM) tablet 100 mg, 100 mg, Oral, Daily  alogliptin (NESINA) tablet 6.25 mg, 6.25 mg, Oral, Daily  aspirin chewable tablet 81 mg, 81 mg, Oral, Daily  atorvastatin (LIPITOR) tablet 40 mg, 40 mg, Oral, Nightly  carvedilol (COREG) tablet 3.125 mg, 3.125 mg, Oral, BID WC  dextrose bolus (hypoglycemia) 10% 125 mL, 125 mL, IntraVENous, PRN **OR** dextrose bolus (hypoglycemia) 10% 250 mL, 250 mL, IntraVENous, PRN  levothyroxine (SYNTHROID) tablet 50 mcg, 50 mcg, Oral, Daily  pregabalin (LYRICA) capsule 50 mg, 50 mg, Oral, BID  acetaminophen (TYLENOL) tablet 650 mg, 650 mg, Oral, Q4H PRN  polyethylene glycol (GLYCOLAX) packet 17 g, 17 g, Oral, Daily PRN  bisacodyl (DULCOLAX) suppository 10 mg, 10 mg, Rectal, Daily PRN    Allergies:  Patient has no known allergies. Social History:   TOBACCO:   reports that he quit smoking about 45 years ago. He smoked 0.50 packs per day.  He has never used smokeless tobacco.  ETOH:   reports no history of alcohol use. DRUGS:   reports no history of drug use. Family History:       Problem Relation Age of Onset    Cancer Mother     Diabetes Father     Heart Disease Father     High Blood Pressure Father     Diabetes Sister     High Blood Pressure Sister     Cancer Brother         prostate, breast    Diabetes Brother     Cancer Maternal Uncle     Diabetes Maternal Grandmother     Stroke Maternal Grandfather        REVIEW OF SYSTEMS:     CONSTITUTIONAL:  negative  RESPIRATORY:  negative  CARDIOVASCULAR:  negative  GASTROINTESTINAL:  negative  GENITOURINARY:  negative    PHYSICAL EXAM:      VITALS:  BP (!) 135/55   Pulse 74   Temp 98.2 °F (36.8 °C) (Oral)   Resp 18   Ht 5' 8\" (1.727 m)   Wt 199 lb 15.3 oz (90.7 kg)   SpO2 99%   BMI 30.40 kg/m²      TEMPERATURE:  Current - Temp: 98.2 °F (36.8 °C); Max - Temp  Av °F (36.7 °C)  Min: 97.7 °F (36.5 °C)  Max: 98.2 °F (36.8 °C)  24HR BLOOD PRESSURE RANGE:  Systolic (80BCJ), MLH:480 , Min:88 , OWJ:531   ; Diastolic (01QWZ), HCM:94, Min:51, Max:76    Physical Exam:  General appearance: alert, appears stated age, cooperative, no distress and mildly obese  Head: Normocephalic, without obvious abnormality, atraumatic  Back: No CVA tenderness  Abdomen: Soft, non-tender, non-distended. 16fr SPT in place with clear yellow urine output.     DATA:    WBC:    Lab Results   Component Value Date    WBC 10.2 2022     Hemoglobin/Hematocrit:    Lab Results   Component Value Date    HGB 9.5 2022    HCT 31.4 2022     BMP:    Lab Results   Component Value Date     2022    K 4.6 2022    K 3.9 2018     2022    CO2 23 2022    BUN 60 2022    LABALBU 3.2 2022    CREATININE 2.0 2022    CALCIUM 9.1 2022    GFRAA 39 2022    LABGLOM 32 2022     PT/INR:    Lab Results   Component Value Date    PROTIME 15.5 2022    PROTIME 15.2 2011    INR 1.20 2022 Imaging:  Echocardiogram complete 2D with doppler with color    Result Date: 3/29/2022  Transthoracic Echocardiography Report (TTE)  Demographics   Patient Name      Janina MARCIAL      Date of Study       03/29/2022   Date of Birth     1940          Gender              Male   Age               78 year(s)          Race                Black   Patient Number    [id number]          Room Number         7910   Visit Number      [id number]   Corporate ID      [id number]   Accession Number  [id number]          Mary Austin RVT   Ordering          Susie Webber MD  Interpreting        Mikayla Cheney  Physician                             Physician           Kade ARNDT  Procedure Type of Study   TTE procedure:ECHOCARDIOGRAM COMPLETE 2D W DOPPLER W COLOR. Procedure Date Date: 03/29/2022 Start: 11:13 AM Study Location: Portable Technical Quality: Adequate visualization Indications:Atrial fibrillation. Patient Status: Routine Height: 72 inches Weight: 204 pounds BSA: 2.15 m2 BMI: 27.67 kg/m2 HR: 78 bpm BP: 136/61 mmHg  Conclusions   Summary  Left ventricular systolic function is normal.  Ejection fraction is visually estimated at 55%. Moderate left ventricular hypertrophy. Grade I diastolic dysfunction. Sclerotic, but non-stenotic aortic valve. No evidence of any pericardial effusion. Signature   ------------------------------------------------------------------  Electronically signed by Dinesh Munguia MD  (Interpreting physician) on 03/29/2022 at 12:28 PM  ------------------------------------------------------------------   Findings   Left Ventricle  Left ventricular systolic function is normal.  Ejection fraction is visually estimated at 55%. Moderate left ventricular hypertrophy. Grade I diastolic dysfunction. Left ventricle size is normal.  No regional wall motion abnormalities.    Left Atrium Septal Velocity:  7.9 cm/s  MV E' Lateral Velocity:  10.4 cm/s  MV E/E' septal: 8  MV E/E' lateral: 6.08      CT ABDOMEN PELVIS WO CONTRAST Additional Contrast? None    Result Date: 3/20/2022  EXAMINATION: CT OF THE ABDOMEN AND PELVIS WITHOUT CONTRAST 3/20/2022 10:18 am TECHNIQUE: CT of the abdomen and pelvis was performed without the administration of intravenous contrast. Multiplanar reformatted images are provided for review. Dose modulation, iterative reconstruction, and/or weight based adjustment of the mA/kV was utilized to reduce the radiation dose to as low as reasonably achievable. COMPARISON: 3/17/2022 HISTORY: ORDERING SYSTEM PROVIDED HISTORY: persistent left flank pain, rule out abscess TECHNOLOGIST PROVIDED HISTORY: Reason for exam:->persistent left flank pain, rule out abscess Additional Contrast?->None Reason for Exam: persistent left flank pain, rule out abscess FINDINGS: Lower Chest: Minimal atelectatic changes in both lower lobes. Organs: The liver, gallbladder, pancreas, spleen, adrenals, right kidney, aorta and IVC appear stable. The aorta is calcified but nonaneurysmal.  The left kidney contains a ureteric stent which is well positioned. No evidence of left renal abscess, obstruction or significant inflammatory changes. GI/Bowel: No evidence of bowel obstruction or perforation. Normal appendix. Pelvis: Suprapubic catheter noted in the urinary bladder. Status post prostatectomy. Peritoneum/Retroperitoneum: No evidence of lymphadenopathy. Bones/Soft Tissues: No acute abnormality. 1. Well-positioned left ureteric stent with no evidence of hydronephrosis, renal abscess or other acute abnormality. Normal right kidney. 2. Minimal atelectatic changes in both lower lobes. 3. Well-positioned suprapubic catheter in the urinary bladder.      CT ABDOMEN PELVIS WO CONTRAST Additional Contrast? None    Result Date: 3/17/2022  EXAMINATION: CT OF THE ABDOMEN AND PELVIS WITHOUT CONTRAST 3/17/2022 3:04 am TECHNIQUE: CT of the abdomen and pelvis was performed without the administration of intravenous contrast. Multiplanar reformatted images are provided for review. Dose modulation, iterative reconstruction, and/or weight based adjustment of the mA/kV was utilized to reduce the radiation dose to as low as reasonably achievable. COMPARISON: 09/12/2020 HISTORY: ORDERING SYSTEM PROVIDED HISTORY: UTI with altered mental status and history of renal stones TECHNOLOGIST PROVIDED HISTORY: Reason for exam:->UTI with altered mental status and history of renal stones Additional Contrast?->None Decision Support Exception - unselect if not a suspected or confirmed emergency medical condition->Emergency Medical Condition (MA) Reason for Exam: UTI with altered mental status and history of renal stones FINDINGS: Lower Chest: Calcified lymph nodes in the right hilar region. Atelectatic or fibrotic changes along the posterior lungs. Organs: Lack of IV contrast does reduce the evaluation of the organs and vasculature. There is streak artifact from the arms at the sides. No obvious masses seen within the liver. The gallbladder is distended and may have a tiny gallstone in the lumen. There is no fat stranding of the gallbladder fossa. The spleen is not enlarged but does have multiple calcified granulomas. No pancreatic calcification or ductal dilatation. There is stranding and edema adjacent to the pancreatic tail but appears more associated with the left kidney. The adrenal glands are not enlarged. The right kidney has mild perinephric stranding no hydronephrosis or hydroureter. The left kidney does have asymmetric edema and increased perinephric stranding. Small amount of fluid are seen in the pararenal space. A left ureteral stent is present coursing down into the urinary bladder. No focal calcifications are seen along the course of the stent. However there is still the left-sided hydronephrosis and hydroureter.  GI/Bowel: The stomach, small bowel, and colon are not dilated. There is a small hiatal hernia with mild thickening of the distal esophagus. Constipation and gaseous distension of the ascending and transverse colon. Appendix is identified and no focal appendicitis. There is no pneumoperitoneum. Pelvis: The urinary bladder is decompressed with a suprapubic catheter. Cannot accurately evaluate the urinary bladder wall. The distal coil of the left ureteral stent is in the inferior aspect versus is an expanded upper portion of the urethral junction. 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 Peritoneum/Retroperitoneum: No abdominal aortic aneurysm but does have moderate calcification. There is no retroperitoneal hematoma. Small left periaortic lymph nodes were present on the previous exam as well. Mild increase in size may be reactive to the left kidney. Bones/Soft Tissues: Degenerative changes in the disc spaces, facet joints, and sacroiliac joints. Large Schmorl node in the inferior endplate of V81 is increased in size. Smaller Schmorl nodes along the lumbar endplates appear stable. 1.  There is left-sided hydronephrosis and hydroureter despite the presence of the left ureteral stent. Increased left perinephric stranding and fluid compared to the right side. Will need to correlate for functionality of the stent versus ascending urinary tract infection. 2.  The urinary bladder is collapsed around the suprapubic catheter. The distal coil of the ureteral stent courses into the urinary bladder along the inferior aspect or an expanded upper portion of the urethral junction. Previous prostate surgery is again noted. 3.  Constipation in the ascending and transverse colon. No small bowel obstruction or pneumoperitoneum. 4.  Mild thickening of the distal esophagus may relate to reflux esophagitis.      CT HEAD WO CONTRAST    Result Date: 3/25/2022  EXAMINATION: CT OF THE HEAD WITHOUT CONTRAST  3/25/2022 5:14 pm TECHNIQUE: CT of the head was performed without the administration of intravenous contrast. Dose modulation, iterative reconstruction, and/or weight based adjustment of the mA/kV was utilized to reduce the radiation dose to as low as reasonably achievable. COMPARISON: 03/17/2022 HISTORY: ORDERING SYSTEM PROVIDED HISTORY: Lt arm numbness TECHNOLOGIST PROVIDED HISTORY: Reason for exam:->Lt arm numbness Has a \"code stroke\" or \"stroke alert\" been called? ->No Reason for Exam: Lt arm numbness FINDINGS: BRAIN/VENTRICLES: There is no acute intracranial hemorrhage, mass effect or midline shift. No abnormal extra-axial fluid collection. The gray-white differentiation is maintained without evidence of an acute infarct. There is no evidence of hydrocephalus. Stable diffuse parenchymal volume loss with moderate chronic white matter microvascular ischemic changes. 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 ORBITS: The visualized portion of the orbits demonstrate no acute abnormality. SINUSES: The visualized paranasal sinuses and mastoid air cells demonstrate no acute abnormality. SOFT TISSUES/SKULL:  No acute abnormality of the visualized skull or soft tissues. 1. No acute intracranial abnormality. 2. Stable moderate chronic white matter microvascular ischemic changes and chronic lacunar infarct in the right basal ganglia. CT HEAD WO CONTRAST    Result Date: 3/17/2022  EXAMINATION: CT OF THE HEAD WITHOUT CONTRAST  3/17/2022 12:19 am TECHNIQUE: CT of the head was performed without the administration of intravenous contrast. Dose modulation, iterative reconstruction, and/or weight based adjustment of the mA/kV was utilized to reduce the radiation dose to as low as reasonably achievable. COMPARISON: 11/16/2021 HISTORY: ORDERING SYSTEM PROVIDED HISTORY: AMS TECHNOLOGIST PROVIDED HISTORY: Reason for exam:->AMS Has a \"code stroke\" or \"stroke alert\" been called? ->No Decision Support Exception - unselect if not a suspected or confirmed emergency medical condition->Emergency Medical Condition (MA) Reason for Exam: AMS FINDINGS: BRAIN/VENTRICLES: There is no acute intracranial hemorrhage, mass effect or midline shift. No abnormal extra-axial fluid collection. The gray-white differentiation is maintained without evidence of an acute infarct. Hypoattenuation of the periventricular and subcortical white matter is suggestive of chronic small vessel ischemic disease. Mild diffuse parenchymal volume loss is noted. There is no evidence of hydrocephalus. ORBITS: The bilateral globes are intact. SINUSES: Mucoperiosteal thickening of the bilateral ethmoid air cells is noted. Other visualized paranasal sinuses and mastoid air cells are clear. SOFT TISSUES/SKULL:  No acute abnormality of the visualized skull or soft tissues. No acute intracranial abnormality. MRI may be obtained if clinically indicated. US HEAD NECK SOFT TISSUE THYROID    Result Date: 3/26/2022  EXAMINATION: THYROID ULTRASOUND 3/26/2022 COMPARISON: None. HISTORY: ORDERING SYSTEM PROVIDED HISTORY: mildly hypothyroid TECHNOLOGIST PROVIDED HISTORY: Reason for exam:->mildly hypothyroid Reason for Exam: hypothyroidism FINDINGS: Right thyroid lobe:  4.5 x 1.4 x 1.8 cm Left thyroid lobe:  3.1 x 1.4 x 1.7 cm Isthmus:  0.6 cm Thyroid Gland:  Background thyroid parenchyma is mildly heterogeneous with normal vascularity. Nodules: No thyroid nodules are present. Cervical lymphadenopathy: No abnormal lymph nodes in the imaged portions of the neck. Mild changes of chronic thyroiditis. [place] LESS THAN 1 HOUR    Result Date: 3/17/2022  Radiology exam is complete. No Radiologist dictation. Please follow up with ordering provider.      XR CHEST PORTABLE    Result Date: 4/10/2022  EXAMINATION: ONE XRAY VIEW OF THE CHEST 4/10/2022 2:00 pm COMPARISON: Chest x-ray from March 16, 2022 HISTORY: ORDERING SYSTEM PROVIDED HISTORY: SOB TECHNOLOGIST PROVIDED HISTORY: Reason for exam:->SOB Reason for Exam: SOB FINDINGS: The cardiomediastinal silhouette is not enlarged. No pleural effusion or pneumothorax. No focal consolidation. Favor mild soft tissue prominence over the left lung base less likely trace pleural effusion or consolidation. Attention with a lateral view if clinically indicated. No acute cardiopulmonary abnormality. Favor mild soft tissue prominence over the left lung base less likely trace pleural effusion or consolidation. Attention with a follow-up lateral view if clinically indicated. XR CHEST PORTABLE    Result Date: 3/17/2022  EXAMINATION: ONE XRAY VIEW OF THE CHEST 3/16/2022 11:48 pm COMPARISON: 01/06/2022 HISTORY: ORDERING SYSTEM PROVIDED HISTORY: fever TECHNOLOGIST PROVIDED HISTORY: Reason for exam:->fever Reason for Exam: FEVER FINDINGS: The cardiac silhouette is at the upper limits of normal in size. Vascular calcifications are noted along the aortic arch. Pulmonary edema is increasing. No focal lung infiltrate. No pleural effusion or pneumothorax. The bones are osteopenic. 1. Worsening pulmonary edema. VL DUP LOWER EXTREMITY VENOUS BILATERAL    Result Date: 3/24/2022  EXAMINATION: DUPLEX VENOUS ULTRASOUND OF THE BILATERAL LOWER EXTREMITIES3/23/2022 8:55 pm TECHNIQUE: Duplex ultrasound using B-mode/gray scaled imaging, Doppler spectral analysis and color flow Doppler was obtained of the deep venous structures of the lower bilateral extremities. COMPARISON: None. HISTORY: ORDERING SYSTEM PROVIDED HISTORY: Bilateral lower extremity pain, swelling, rule out DVT TECHNOLOGIST PROVIDED HISTORY: Reason for exam:->Bilateral lower extremity pain, swelling, rule out DVT Reason for Exam: Pain and swelling FINDINGS: The visualized veins of the bilateral lower extremities are patent and free of echogenic thrombus. The veins demonstrate good compressibility with normal color flow study and spectral analysis.  Of note, the right popliteal vein could not be compressed due to patient movement. No evidence of DVT in either lower extremity. XR HIP 2-3 VW W PELVIS LEFT    Result Date: 3/20/2022  EXAMINATION: ONE XRAY VIEW OF THE PELVIS AND TWO XRAY VIEWS LEFT HIP 3/20/2022 11:35 am COMPARISON: 03/20/2022 HISTORY: ORDERING SYSTEM PROVIDED HISTORY: persistent left hip pain, limitation of movement TECHNOLOGIST PROVIDED HISTORY: Reason for exam:->persistent left hip pain, limitation of movement Reason for Exam: persistent left hip pain, limitation of movement Additional signs and symptoms: persistent left hip pain, limitation of movement Relevant Medical/Surgical History: persistent left hip pain, limitation of movement FINDINGS: The hip demonstrates normal alignment. No evidence of acute fracture. No focal osseus lesion. Pelvis is intact. Cartilage space is maintained. No acute abnormality or arthropathy of the hip. Assessment & Plan:      Hidla Guzman is a 78y.o. year old male admitted 6/62/4612 for metabolic encephalopathy.    1) Chronic Urinary Retention: managed with suprapubic catheter and exchanged monthly, last exchanged 4/13/22.              Recommend SPT exchanges q3 weeks. 2) Left Hydronephrosis: s/p cystoscopy, left ureteral stent exchange 3/17/22              S/p cystoscopy, left ureteral stent placement 7/4/21              CT a/p 3/20/22: Well-positioned left ureteric stent with no evidence of hydronephrosis, renal abscess or other acute abnormality.  Normal right kidney. Well-positioned suprapubic catheter in the urinary bladder.              BS a/p 3/17/22: There is left-sided hydronephrosis and hydroureter despite the presence of the left ureteral stent.  Increased left perinephric stranding and fluid compared to the right side.              The pt will need q3 month left ureteral stent exchanges  3) H/o Prostate Cancer: S/p RPP with Dr. Marni Dennis in [address] [place] [address]. PSA 0.93 2/2021.  On Eligard q6 months     Pt stable from a  standpoint. Will sign off, please call with any questions. Pt to follow up with Dr. Dalia Rosa in 3 weeks for reevaluation, will exchange SPT at that time. Patient seen and examined, chart reviewed.      Electronically signed by Lela Aparicio PA-C on 4/13/2022 at 7:52 AM/

## 2022-04-13 NOTE — PROGRESS NOTES
Physical Therapy      [] daily progress note       [x] discharge       Patient Name:  Joaquim Nelson   :  1940 MRN: [id number]  Room:  [address] [place] [address] Date of Admission: 3/30/2022  Rehabilitation Diagnosis:   Metabolic encephalopathy [Y48.28]  Metabolic encephalopathy [X27.41]       Date 2022       Day of ARU Week:  1   Time IN/OUT 7069-8897  4746-8713   Individual Tx Minutes 75+23   TOTAL Tx Time Mins 98   Variance Time -7 ( 7' overage per Rachel OT)    Variance Time []   Refusal due to:     []   Medical hold/reason:    []   Illness   []   Off Unit for test/procedure  []   Extra time needed to complete task  []   Therapeutic need  []   Other (specify):   Restrictions Restrictions/Precautions  Restrictions/Precautions: General Precautions,Fall Risk,Contact Precautions (suprapubic catheter)      Communication with other providers: [x]   OK to see per nursing:     []   Spoke with team member regarding:      Subjective observations and cognitive status: Pt up in [facility], willing to participate, reports fatigue from not sleeping well last night. PM: Pt asleep in WC. Pt initially denying needs for toileting, then once standing pt then reports needing to go, Req extra time, needing A of STNA at end of session to A pt with toileting task and transfer to bed due to time restraints. Pain level/location:   No reports of pain   Discharge recommendations  Anticipated discharge date:    Destination: unclear at this time. Feel pt will need 24 hour care which son cannot provide   []???home alone   []? ??home alone with assist PRN     []? ?? home w/ family      []? ?? Continuous supervision  [x]? ?? SNF    []??? Assisted living     []? ?? Other:  Continued therapy: []???C PT  []???OUTPATIENT PT   []??? No Further PT  []???SNF PT  Caregiver training recommended: []? ? ? Yes  []??? No   Equipment needs: none anticipated if discharged to SNF     PT QI   Bed Mobility: See OT documentation, unable to capture.      Transfers:    Sit Resolved    to Stand  Assistance Needed: Supervision or touching assistance  Comment: SB-CGA from [facility], extra time and cues   CARE Score: 4  Discharge Goal: Independent    Chair/Bed-to-Chair Transfer  Assistance Needed: Supervision or touching assistance  Comment: CGA for balance with cues for lining up to chair before attempting to sit  CARE Score: 4  Discharge Goal: Independent    Car Transfer  Assistance Needed: Supervision or touching assistance  Comment: SBA for balance, self assists LEs in/out of car with extra time  CARE Score: 4  Discharge Goal: Supervision or touching assistance         Object: Picking Up Object  Assistance Needed: Supervision or touching assistance  Comment: SBA for balance at RW using reacher, min safety cues  CARE Score: 4  Discharge Goal: Supervision or touching assistance    Ambulation:    Walking Ability  Does the Patient Walk?: Yes     Walk 10 Feet  Assistance Needed: Supervision or touching assistance  Comment: CGA for balance with RW, cues for increased hip and knee extension with initail ambulation  CARE Score: 4  Discharge Goal: Independent     Walk 50 Feet with Two Turns  Assistance Needed: Supervision or touching assistance  Comment: CGA for balance with RW, amb 80' max distance  CARE Score: 4  Discharge Goal: Supervision or touching assistance     Walk 150 Feet  Comment: 80' max distance  Reason if not Attempted: Not attempted due to medical condition or safety concerns  CARE Score: 88  Discharge Goal: Supervision or touching assistance     Walking 10 Feet on Uneven Surfaces  Assistance Needed: Supervision or touching assistance  Comment: CGA for balance with RW over carpet with bean bags underneath  CARE Score: 4  Discharge Goal: Supervision or touching assistance     1 Step (Curb)  Assistance Needed: Supervision or touching assistance  Comment: CGA for balance with RW, min seq cus  CARE Score: 4  Discharge Goal: Supervision or touching assistance     4 Steps  Assistance Needed: Exercise    Neuro Re-Ed    Therapeutic Activity 28   Wheelchair Propulsion 30   Group    Other:    TOTAL 98         Social History  Social/Functional History  Lives With: Son  Type of Home: House  Home Layout: One level  Home Access: Ramped entrance  Bathroom Shower/Tub: Tub/Shower unit,Shower chair with back  Bathroom Toilet: Standard  Bathroom Equipment: Grab bars in shower,Shower chair  Bathroom Accessibility: Arvil Mercer accessible (states once he is in bathroom he uses countertops and grab bars, but pt not able to clarify if this is because there is little room or just preference)  Home Equipment: [address] walker (pt thinks there may be a BSC from his wife in storage, but unsure)  ADL Assistance: Independent  Homemaking Assistance: Independent  Homemaking Responsibilities: Yes  Meal Prep Responsibility: Secondary  Laundry Responsibility: Primary  Cleaning Responsibility: Secondary  Bill Paying/Finance Responsibility: Primary  Shopping Responsibility: Secondary  Health Care Management: Primary (has pill box with alarms per pt. until recently [address] would set up, but once [facility] stopped he managed on own)  Ambulation Assistance: Independent (mod I with 4ww for up to 150'. pt used electronic carts at stores when able)  Transfer Assistance: Independent  Active : No  Patient's  Info: 3 sons assist in driving. Mode of Transportation: Car,SUV  Occupation: Retired  Type of occupation: Retired from [address] [place] [address] Street: TV, very little reading, no pets, doctors, son manages groceries,  Meds are managed via pill dispenser that son manages. Pt occasionally writes checks and some auto pay  Additional Comments: pt sleeps in flat bed. Pt has fallen >4 times in past year. Pt attributes many to low BS. No injury    Objective                                                                                    Goals:  (Update in navigator)   :   Long term goals  Time Frame for Long term goals : 12-14 days  Long term goal 1: Pt will perform bed mobility with mod I  Long term goal 2: Pt will perform sit to stand and pivot transfers with mod I, car transfers with CGA  Long term goal 3: Pt will ambulate 10' with 2ww with mod I, up to 150' with supervision including uneven surfaces  Long term goal 4: Pt will ascend/descend curb step with 2ww and up to 4 steps with B rails with supervision  Long term goal 5: Pt will  object from floor with 2ww and reacher with supervision:        Plan of Care                                                                              Times per week: 5 days per week for a minimum of 60 minutes/day plus group as appropriate for 60 minutes.   Treatment to include Current Treatment Recommendations: Strengthening,ROM,Balance Training,Functional Mobility Training,Transfer Training,ADL/Self-care Training,Gait Training,Patient/Caregiver Education & Training,IADL Training,Stair training,Equipment Evaluation, Education, & procurement,Neuromuscular Re-education,Pain Management,Home Exercise [employer] Training,Safety Education & Training,Cognitive/Perceptual Training,Cognitive Reorientation    Electronically signed by   Naomi Rutledge, DION #[id number]  4/13/2022, 3:03 PM

## 2022-04-13 NOTE — CARE COORDINATION
LSW called Tomasa with [facility] ([phone]) to check status of referral.  Message left on confidential voicemail and LSW awaits call back. LSW then called Thi Bahena with [facility] ([phone]). Message left on this voicemail as well.

## 2022-04-14 VITALS
SYSTOLIC BLOOD PRESSURE: 146 MMHG | RESPIRATION RATE: 16 BRPM | TEMPERATURE: 97.1 F | BODY MASS INDEX: 29.5 KG/M2 | WEIGHT: 194.67 LBS | HEIGHT: 68 IN | OXYGEN SATURATION: 100 % | DIASTOLIC BLOOD PRESSURE: 76 MMHG | HEART RATE: 80 BPM

## 2022-04-14 LAB
GLUCOSE BLD-MCNC: 129 MG/DL (ref 70–99)
GLUCOSE BLD-MCNC: 92 MG/DL (ref 70–99)
GLUCOSE BLD-MCNC: 93 MG/DL (ref 70–99)
SARS-COV-2, NAAT: NOT DETECTED
SOURCE: NORMAL

## 2022-04-14 PROCEDURE — 6370000000 HC RX 637 (ALT 250 FOR IP): Performed by: INTERNAL MEDICINE

## 2022-04-14 PROCEDURE — 99239 HOSP IP/OBS DSCHRG MGMT >30: CPT | Performed by: PHYSICAL MEDICINE & REHABILITATION

## 2022-04-14 PROCEDURE — 6360000002 HC RX W HCPCS: Performed by: INTERNAL MEDICINE

## 2022-04-14 PROCEDURE — 94761 N-INVAS EAR/PLS OXIMETRY MLT: CPT

## 2022-04-14 PROCEDURE — 6370000000 HC RX 637 (ALT 250 FOR IP): Performed by: PHYSICAL MEDICINE & REHABILITATION

## 2022-04-14 PROCEDURE — 2500000003 HC RX 250 WO HCPCS: Performed by: PHYSICAL MEDICINE & REHABILITATION

## 2022-04-14 PROCEDURE — 87635 SARS-COV-2 COVID-19 AMP PRB: CPT

## 2022-04-14 PROCEDURE — 82962 GLUCOSE BLOOD TEST: CPT

## 2022-04-14 RX ORDER — LEVOTHYROXINE SODIUM 0.05 MG/1
50 TABLET ORAL DAILY
Qty: 30 TABLET | Refills: 0
Start: 2022-04-15 | End: 2022-05-10

## 2022-04-14 RX ORDER — PREGABALIN 50 MG/1
50 CAPSULE ORAL 2 TIMES DAILY
Qty: 60 CAPSULE | Refills: 3 | Status: SHIPPED | OUTPATIENT
Start: 2022-04-14 | End: 2022-05-10

## 2022-04-14 RX ORDER — ASPIRIN 81 MG/1
81 TABLET, CHEWABLE ORAL DAILY
Qty: 30 TABLET | Refills: 3 | Status: ON HOLD | COMMUNITY
Start: 2022-04-15 | End: 2022-04-18

## 2022-04-14 RX ORDER — SODIUM BICARBONATE 650 MG/1
650 TABLET ORAL 3 TIMES DAILY
Qty: 90 TABLET | Refills: 0
Start: 2022-04-14 | End: 2022-08-09

## 2022-04-14 RX ORDER — TRAMADOL HYDROCHLORIDE 50 MG/1
25 TABLET ORAL EVERY 6 HOURS PRN
Qty: 5 TABLET | Refills: 0 | Status: ON HOLD | OUTPATIENT
Start: 2022-04-14 | End: 2022-04-22 | Stop reason: HOSPADM

## 2022-04-14 RX ORDER — CARVEDILOL 3.12 MG/1
3.12 TABLET ORAL 2 TIMES DAILY WITH MEALS
Qty: 60 TABLET | Refills: 0 | Status: ON HOLD
Start: 2022-04-14 | End: 2022-09-21 | Stop reason: HOSPADM

## 2022-04-14 RX ORDER — ATORVASTATIN CALCIUM 40 MG/1
40 TABLET, FILM COATED ORAL NIGHTLY
Qty: 30 TABLET | Refills: 0
Start: 2022-04-14 | End: 2022-08-09 | Stop reason: DRUGHIGH

## 2022-04-14 RX ADMIN — ACETAMINOPHEN 650 MG: 325 TABLET ORAL at 06:14

## 2022-04-14 RX ADMIN — INSULIN GLARGINE 10 UNITS: 100 INJECTION, SOLUTION SUBCUTANEOUS at 08:10

## 2022-04-14 RX ADMIN — HEPARIN SODIUM 5000 UNITS: 5000 INJECTION INTRAVENOUS; SUBCUTANEOUS at 06:14

## 2022-04-14 RX ADMIN — ALOGLIPTIN 6.25 MG: 6.25 TABLET, FILM COATED ORAL at 08:08

## 2022-04-14 RX ADMIN — SODIUM BICARBONATE 650 MG: 650 TABLET ORAL at 07:57

## 2022-04-14 RX ADMIN — PREGABALIN 50 MG: 50 CAPSULE ORAL at 08:08

## 2022-04-14 RX ADMIN — ASPIRIN 81 MG 81 MG: 81 TABLET ORAL at 07:56

## 2022-04-14 RX ADMIN — LEVOTHYROXINE SODIUM 50 MCG: 0.05 TABLET ORAL at 06:13

## 2022-04-14 RX ADMIN — MICONAZOLE NITRATE: 2 POWDER TOPICAL at 08:19

## 2022-04-14 RX ADMIN — ALLOPURINOL 100 MG: 100 TABLET ORAL at 07:56

## 2022-04-14 RX ADMIN — CARVEDILOL 3.12 MG: 6.25 TABLET, FILM COATED ORAL at 07:57

## 2022-04-14 ASSESSMENT — PAIN DESCRIPTION - ORIENTATION
ORIENTATION: ANTERIOR;RIGHT
ORIENTATION: ANTERIOR;RIGHT

## 2022-04-14 ASSESSMENT — PAIN SCALES - GENERAL
PAINLEVEL_OUTOF10: 7
PAINLEVEL_OUTOF10: 6
PAINLEVEL_OUTOF10: 5

## 2022-04-14 ASSESSMENT — PAIN DESCRIPTION - LOCATION
LOCATION: HIP
LOCATION: KNEE;HIP

## 2022-04-14 ASSESSMENT — PAIN DESCRIPTION - PAIN TYPE
TYPE: ACUTE PAIN
TYPE: ACUTE PAIN

## 2022-04-14 NOTE — DISCHARGE INSTR - COC
Continuity of Care Form    Patient Name: Rigo Hewitt   :  1940  MRN:  [id number]    [facility] date:  3/30/2022  Discharge date:  ***    Code Status Order: Full Code   Advance Directives:      Admitting Physician:  Elidia Klein MD  PCP: Fern Canseco MD    Discharging Nurse: [facility] Unit/Room#: 1025/1025-A  Discharging Unit Phone Number: ***    Emergency Contact:   Extended Emergency Contact Information  Primary Emergency Contact: [address] Phone: [phone]  Relation: Child  Secondary Emergency Contact: [address], [address] Phone: [phone]  Mobile Phone: [phone]  Relation: Child  Preferred language: English   needed? No    Past Surgical History:  Past Surgical History:   Procedure Laterality Date    BLADDER SURGERY      BLADDER SURGERY Left 3/17/2022    CYSTOSCOPY LEFT STENT EXCHANGE performed by Sarah Alas MD at Replaced by [facility]  3/28/13    Cysto with removal of artificial sphinter and placement of suprapubic catheter. PROSTATE SURGERY      PROSTATECTOMY      infection       Immunization History: There is no immunization history on file for this patient.     Active Problems:  Patient Active Problem List   Diagnosis Code    Gait disturbance R26.9    Generalized weakness R53.1    Diabetes mellitus ([address] E11.9    Osteoarthritis M19.90    Anemia of chronic disorder D63.8    Infected implanted bladder sphincter cuff T83.69XA    Escherichia coli urinary tract infection N39.0, B96.20    Hypertension I10    Sepsis ([address] A41.9    Type 2 diabetes mellitus with hypoglycemia without coma ([facility]) E11.649    UTI (urinary tract infection) due to urinary indwelling catheter ([facility]) T83.511A, N39.0    Hyperkalemia E87.5    Acute kidney failure ([facility]) N17.9    Leg weakness, bilateral R29.898    Type 2 diabetes mellitus with neurological manifestations, uncontrolled ([facility]) S15.25, I87.57    Complicated UTI (urinary tract infection) N39.0    Essential hypertension I10    Severe muscle deconditioning R29.898    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 (HCC) E11.69, E78.5    Frequent falls R29.6    Chronic kidney disease, stage 3a (HCC) N18.31    Uncontrolled type 2 diabetes mellitus with peripheral neuropathy (HCC) E11.42, E11.65    Prostate cancer ([facility] C61    Suprapubic catheter ([facility] Z93.59    Sepsis due to urinary tract infection ([facility] A41.9, N39.0    Coagulase negative Staphylococcus bacteremia R78.81, B95.7    Chronic kidney disease, stage IV (severe) ([facility]) H22.1    Acute metabolic encephalopathy G88.58    SVT (supraventricular tachycardia) ([facility]) P13.7    Metabolic encephalopathy C63.22    History of prostate cancer Z85.46    Cigarette nicotine dependence without complication P81.293       Isolation/Infection:   Isolation            Contact          Patient Infection Status       Infection Onset Added Last Indicated Last Indicated By Review Planned Expiration Resolved Resolved By    ESBL (Extended Spectrum Beta Lactamase) 21 Culture, Urine        MDRO (multi-drug resistant organism) 21 Culture, Urine        STAPHYLOCOCCUS CAPITIS  blood 2 3/22/22    Resolved    COVID-19 (Rule Out) 22 COVID-19, Rapid (Ordered)   22 Rule-Out Test Resulted    COVID-19 22 COVID-19, Rapid   22     COVID-19 (Rule Out) 22 COVID-19, Rapid (Ordered)   22 Rule-Out Test Resulted    COVID-19 (Rule Out) 21 COVID-19, Rapid (Ordered)   21 Rule-Out Test Resulted    MRSA  16 Jeri Tucker RN   18 Jeri Tucker RN    16 urine            Nurse Assessment:  Last Vital Signs: BP (!) 144/72   Pulse 76   Temp 97.1 °F (36.2 °C) (Oral)   Resp 14   Ht 5' 8\" (1.727 m)   Wt 194 lb 10.7 oz (88.3 kg)   SpO2 100%   BMI 29.60 kg/m²     Last documented pain score (0-10 scale): Pain Level: 6  Last Weight:   Wt Readings from Last 1 Encounters:   04/14/22 194 lb 10.7 oz (88.3 kg)     Mental Status:  oriented, alert, coherent, logical, and able to concentrate and follow conversation    IV Access:  - None    Nursing Mobility/ADLs:  Walking   Assisted  Transfer  Assisted  Bathing  Assisted  Dressing  Dependent  Toileting  Dependent  Feeding  Independent  [facility]. Delivery   none    Wound Care Documentation and Therapy:        Elimination:  Continence: Bowel: Yes  Bladder: No  Urinary Catheter:  Suprapubic catheter Last Change Date 04/13/22  Colostomy/Ileostomy/Ileal Conduit: No   ---    Date of Last BM: 4/13/2022    Intake/Output Summary (Last 24 hours) at 4/14/2022 0951  Last data filed at 4/14/2022 0845  Gross per 24 hour   Intake 780 ml   Output --   Net 780 ml     I/O last 3 completed shifts: In: 12 [P.O.:960]  Out: 427 [Urine:426; Stool:1]    Safety Concerns:     Fall prevention    Impairments/Disabilities:      Vision. Wears Glasses    Nutrition Therapy:  Current Nutrition Therapy:   - Oral Diet:  Carb Control 5 carbs/meal (2000kcals/day)    Routes of Feeding: Oral  Liquids: No Restrictions  Daily Fluid Restriction: no  Last Modified Barium Swallow with Video (Video Swallowing Test): None    Treatments at the Time of Hospital Discharge:   Respiratory Treatments: ***  Oxygen Therapy:  is not on home oxygen therapy.   Ventilator:    - No ventilator support    Rehab Therapies: {THERAPEUTIC INTERVENTION:5895927001}  Weight Bearing Status/Restrictions: No weight bearing restrictions  Other Medical Equipment (for information only, NOT a DME order):  wheelchair and walker  Other Treatments: n/a    Patient's personal belongings (please select all that are sent with patient):  Glasses, Dentures upper    RN SIGNATURE:  Char Mcfarland, RN    CASE MANAGEMENT/SOCIAL WORK SECTION    Inpatient Status Date: 03/30/22    Readmission Risk Assessment Score:  Readmission Risk              Risk of Unplanned Readmission:  37         Discharging to Facility/ Agency   Name: [facility]  Address: [place] [address], [facility], [address] [facility]  Phone: [phone]  Fax: [phone]    / signature: Electronically signed by DAWIT Mccarthy LSW on 4/14/22 at 9:53 AM EDT    PHYSICIAN SECTION    Prognosis: {Prognosis:6334835387}    Condition at Discharge: [facility] Patient Condition:773228002}    Rehab Potential (if transferring to Rehab): {Prognosis:2178598610}    Recommended Labs or Other Treatments After Discharge: ***    Physician Certification: I certify the above information and transfer of Yovana Dewey  is necessary for the continuing treatment of the diagnosis listed and that he requires {Admit to Appropriate Level of Care:85020} for {GREATER/LESS:761232275} 30 days.      Update Admission H&P: {CHP DME Changes in VONCP:904473353}    PHYSICIAN SIGNATURE:  {Esignature:[id number]}

## 2022-04-14 NOTE — CARE COORDINATION
LSW received call from Jhoan Bowman with [facility] ([phone]). They can accept patient today. Treatment team and son Maribell Garcia ([phone]) notified. Rapid COVID needed. Dr. Cristo Guzman notified. Yon Yeager will stretcher transport patient at 2 PM.    Nursing:  Call report to [phone].    10:15 AM  PASRR completed via online [employer] website. 12:00 PM  Requested follow-ups for patient have been set. No DME or [address] set as patient is discharging to SNF. Patient is accepted and will transfer to [facility] today. Yon Yeager will provide stretcher transport at 2 PM.  LSW has updated family and Nursing to transport time. Patient is set for discharge from case management standpoint.

## 2022-04-14 NOTE — PROGRESS NOTES
Faheem Thompson    : 1940  Acct #: [de-identified]  MRN: [id number]              PM&R Progress Note      Admitting diagnosis: Metabolic encephalopathy ( [place] 2. 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 Still needs assistance with all activities. Prior (baseline) level of function: Independent.     Current level of function:         Current  [facility] and Goals:   Occupational Therapy:    Short term goals  Time Frame for Short term goals: STGs=LTGs :   Long term goals  Time Frame for Long term goals : 10-14 days or until d/c  Long term goal 1: Pt will complete grooming tasks Ind seated  Long term goal 2: Pt will complete total body bathing c S  Long term goal 3: Pt will complete UB dressing c setup  Long term goal 4: Pt will complete LB dressing c supervision using AE PRN  Long term goal 5: Pt will doff/don footwear c supervision using AE PRN  Long term goals 6: Pt will complete toileting c S  Long term goal 7: Pt will complete functional transfers (bed, chair, toilet, shower) c DME PRN and S  Long term goal 8: Pt will perform therex/therax to facilitate increased strength/endurance/ax tolerance (c emphasis on dynamic standing balance >8 mins, BUE endurance, cognitive retraining) c SBA :                                       Eating: Eating  Assistance Needed: Independent  Comment: assist to open packages/containers  CARE Score: 6  Discharge Goal: Independent       Oral Hygiene: Oral Hygiene  Assistance Needed: Independent  Comment: seated at sink  CARE Score: 6  Discharge Goal: Independent    UB/LB Bathing: Shower/Bathe Self  Assistance Needed: Supervision or touching assistance  Comment: pt able to bathe UEs, chest, abdomen, thighs, perineal area and distal LEs; CGA in stance to bathe bottom  CARE Score: 4  Discharge Goal: Supervision or touching assistance    UB Dressing: Upper Body Dressing  Assistance Needed: Setup or clean-up assistance  Comment: Pt able to doff/don T shirt  CARE Score: 5  Discharge Goal: Set-up or clean-up assistance         LB Dressing: Lower Body Dressing  Assistance Needed: Partial/moderate assistance  Comment: Pt able to trhead mccrary bag and BLEs intp pants and brief c increased time and verbal cues; assist to manage over hips  CARE Score: 3  Discharge Goal: Supervision or touching assistance    Donning and [place] Footwear: Putting On/Taking Off Footwear  Assistance Needed: Setup or clean-up assistance  Comment: Pt able to doff socks using reacher; S/U to don socks using sock aid  CARE Score: 5  Discharge Goal: Supervision or touching assistance      Toileting: Toileting Hygiene  Assistance Needed: Supervision or touching assistance  Comment: CGA for clothing management; pt did not have BM  CARE Score: 4  Discharge Goal: Supervision or touching assistance      Toilet Transfers:   Toilet Transfer  Assistance Needed: Supervision or touching assistance  Comment: CGA c use of RW and grab bars  CARE Score: 4  Discharge Goal: Supervision or touching assistance    Physical Therapy:         Long term goals  Time Frame for Long term goals : 12-14 days  Long term goal 1: Pt will perform bed mobility with mod I  Long term goal 2: Pt will perform sit to stand and pivot transfers with mod I, car transfers with CGA  Long term goal 3: Pt will ambulate 10' with 2ww with mod I, up to 150' with supervision including uneven surfaces  Long term goal 4: Pt will ascend/descend curb step with 2ww and up to 4 steps with B rails with supervision  Long term goal 5: Pt will  object from floor with 2ww and reacher with supervision      Bed Mobility:   Sit to Lying  Assistance Needed: Substantial/maximal assistance  Comment: max assist for B LE, improved control of trunk  CARE Score: 2  Discharge Goal: Independent  Roll Left and Right  Assistance Needed: Substantial/maximal assistance  Comment: max assist with rails  CARE Score: 2  Discharge Goal: Independent  Lying to Sitting on Side of Bed  Assistance Needed: Partial/moderate assistance  Comment: min-mod A with bed features, max time and cues;  Req A /cues for catheter management/awareness prior to transferring opposite direction OOB  CARE Score: 3  Discharge Goal: Independent    Transfers:    Sit to Stand  Assistance Needed: Supervision or touching assistance  Comment: SB-CGA from [facility], extra time and cues   Reason if not Attempted: Not attempted due to medical condition or safety concerns  CARE Score: 4  Discharge Goal: Independent  Chair/Bed-to-Chair Transfer  Assistance Needed: Supervision or touching assistance  Comment: CGA for balance with cues for lining up to chair before attempting to sit  Reason if not Attempted: Not attempted due to medical condition or safety concerns  CARE Score: 4  Discharge Goal: Independent  Toilet Transfer  Assistance Needed: Substantial/maximal assistance  Comment: max assist and max verbal cues for techique, posture, safety  CARE Score: 2  Car Transfer  Assistance Needed: Supervision or touching assistance  Comment: SBA for balance, self assists LEs in/out of car with extra time  Reason if not Attempted: Not attempted due to medical condition or safety concerns  CARE Score: 4  Discharge Goal: Supervision or touching assistance    Ambulation:    Walking Ability  Does the Patient Walk?: Yes     Walk 10 Feet  Assistance Needed: Supervision or touching assistance  Comment: CGA for balance with RW, cues for increased hip and knee extension with initail ambulation  Reason if not Attempted: Not attempted due to medical condition or safety concerns  CARE Score: 4  Discharge Goal: Independent     Walk 50 Feet with Two Turns  Assistance Needed: Supervision or touching assistance  Comment: CGA for balance with RW, amb 80' max distance  Reason if not Attempted: Not attempted due to medical condition or safety concerns  CARE Score: 4  Discharge Goal: Supervision or touching assistance     Walk 150 Feet  Comment: 80' max distance  Reason if not Attempted: Not attempted due to medical condition or safety concerns  CARE Score: 88  Discharge Goal: Supervision or touching assistance     Walking 10 Feet on Uneven Surfaces  Assistance Needed: Supervision or touching assistance  Comment: CGA for balance with RW over carpet with bean bags underneath  Reason if not Attempted: Not attempted due to medical condition or safety concerns  CARE Score: 4  Discharge Goal: Supervision or touching assistance     1 Step (Curb)  Assistance Needed: Supervision or touching assistance  Comment: CGA for balance with RW, min seq cus  Reason if not Attempted: Not attempted due to medical condition or safety concerns  CARE Score: 4  Discharge Goal: Supervision or touching assistance     4 Steps  Assistance Needed: Supervision or touching assistance  Comment: CGA for balance using B rails, min seq cues, extra time, max effort  Reason if not Attempted: Not attempted due to medical condition or safety concerns  CARE Score: 4  Discharge Goal: Supervision or touching assistance     12 Steps  Comment: 4 steps pt's max effort  Reason if not Attempted: Not applicable  CARE Score: 9  Discharge Goal: Not Applicable       Wheelchair:  w/c Ability: Wheelchair Ability  Uses a Wheelchair and/or Scooter?: No  Wheel 50 Feet with Two Turns  Assistance Needed: Supervision or touching assistance  Comment: Supervision and max cues for sequencing UEs and LEs together, continuation of task  CARE Score: 4  Wheel 150 Feet  Assistance Needed: Partial/moderate assistance  Comment: 80' max distance with max effort, cues and req 27' to complete.   CARE Score: 3          Balance:        Object: Picking Up Object  Assistance Needed: Supervision or touching assistance  Comment: SBA for balance at RW using reacher, min safety cues  Reason if not Attempted: Not attempted due to medical condition or safety concerns  CARE Score: 4  Discharge Goal: Supervision or touching assistance    I      Exam:    Blood pressure 126/68, pulse 88, temperature 98.1 °F (36.7 °C), temperature source Oral, resp. rate 18, height 5' 8\" (1.727 m), weight 199 lb 15.3 oz (90.7 kg), SpO2 100 %. General: Lying back in bed. Quiet. Limited conversation today. HEENT: MMM. Neck supple. No JVD. Pulmonary: Shallow but clearing. Cardiac: RRR. Abdomen: Patient's abdomen is soft and nondistended. Bowel sounds were present throughout. There was no rebound, guarding or masses noted. Suprapubic catheter in place with no tenderness. Upper extremities: Patient was able to bring both hands up to meet mine. Fatigues quickly but has normal tone without tremor. Lower extremities: 3+/5 strength throughout. Quick to fatigue. Trace edema throughout. No reflexes. Sitting balance was fair+. Standing balance was poor. Lab Results   Component Value Date    WBC 10.2 04/09/2022    HGB 9.5 (L) 04/09/2022    HCT 31.4 (L) 04/09/2022    MCV 98.1 04/09/2022     04/09/2022     Lab Results   Component Value Date    INR 1.20 03/17/2022    INR 0.99 05/19/2015    INR 1.31 03/23/2013    PROTIME 15.5 (H) 03/17/2022    PROTIME 11.3 05/19/2015    PROTIME 14.2 03/23/2013     Lab Results   Component Value Date    CREATININE 2.0 (H) 04/12/2022    BUN 60 (H) 04/12/2022     04/12/2022    K 4.6 04/12/2022     04/12/2022    CO2 23 04/12/2022     Lab Results   Component Value Date    ALT 12 04/04/2022    AST 14 (L) 04/04/2022    ALKPHOS 108 04/04/2022    BILITOT 0.2 04/04/2022       Expected length of stay  prior to a minimum physical assist level of discharge to a local skilled nursing facility is 4/14/2022. Recommendations:    1. Metabolic encephalopathy with gait disturbance: Patient continues to struggle with fatigue and leg weakness during the daily occupational and physical therapy with speech-language pathology. Fair sleep. Fair oral intake. Still needs maximum cues to sequence tasks safely. We will transition to a skilled nursing facility for ongoing therapies and physical assistance after discharge from rehab tomorrow. 2. DVT prophylaxis: Heparin 5000 units every 8 hours. I must monitor his hemoglobin and platelet count periodically while on this medication. Weightbearing activities have been limited but tried daily. GI prophylaxis offered. No signs of acute blood loss. We will discontinue the heparin at discharge as his weightbearing activities are improving some. 3. Urinary tract infection: He has completed his oral antibiotics. Urology will change his suprapubic cath before discharge tomorrow. Encouraging oral hydration. Changing his suprapubic catheter monthly. Outpatient follow-up with urology. 4. Hypertension: Coreg and [place] for blood pressure regulation. Target systolic blood pressure is 120-140. Vital signs are checked at rest and with activity. Blood pressure is within target range today. 5. 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 Scheduled Humalog and Lantus. Restarted oral agents. Lyrica for the neuropathy symptoms. Blood sugars are checked at mealtime and bedtime. 6. Prostate cancer: Continue with the suprapubic catheter with changes monthly. Encouraging oral hydration. Outpatient follow-up with urology. 7. SVT: Cautious thyroid replacement. Coreg for rate control. Vital signs are checked at rest and with activity. Daily weights to detect any decompensation associated with CHF.

## 2022-04-14 NOTE — PLAN OF CARE
Problem: Skin Integrity:  Goal: Will show no infection signs and symptoms  Description: Will show no infection signs and symptoms  4/14/2022 0939 by Carlita Nguyen RN  Outcome: Met This Shift  4/14/2022 0024 by Carisa Mckee LPN  Outcome: Ongoing  4/14/2022 0024 by Carisa Mckee LPN  Outcome: Ongoing     Problem: Skin Integrity:  Goal: Absence of new skin breakdown  Description: Absence of new skin breakdown  4/14/2022 0939 by Carlita Nguyen RN  Outcome: Met This Shift  4/14/2022 0024 by Carisa Mckee LPN  Outcome: Ongoing  4/14/2022 0024 by Carisa Mckee LPN  Outcome: Ongoing     Problem: Infection:  Goal: Will remain free from infection  Description: Will remain free from infection  4/14/2022 0939 by Carlita Nguyen RN  Outcome: Met This Shift  4/14/2022 0024 by Carisa Mckee LPN  Outcome: Ongoing  4/14/2022 0024 by Carisa Mckee LPN  Outcome: Ongoing     Problem: Safety:  Goal: Free from accidental physical injury  Description: Free from accidental physical injury  4/14/2022 0939 by Carlita Nguyen RN  Outcome: Met This Shift  4/14/2022 0024 by Carisa Mckee LPN  Outcome: Ongoing  4/14/2022 0024 by Carisa Mckee LPN  Outcome: Ongoing     Problem: Pain:  Goal: Patient's pain/discomfort is manageable  Description: Patient's pain/discomfort is manageable  4/14/2022 0939 by Carlita Nguyen RN  Outcome: Met This Shift  4/14/2022 0024 by Carisa Mckee LPN  Outcome: Ongoing  4/14/2022 0024 by Carisa Mckee LPN  Outcome: Ongoing     Problem: Mobility - Impaired:  Goal: Mobility will improve  Description: Mobility will improve  4/14/2022 0939 by Carlita Nguyen RN  Outcome: Met This Shift  4/14/2022 0024 by Carisa Mkcee LPN  Outcome: Ongoing  4/14/2022 0024 by Carisa Mckee LPN  Outcome: Ongoing

## 2022-04-14 NOTE — PROGRESS NOTES
disease-had intermittent left hydronephrosis due to left ureteropelvic obstruction of unknown origin, requiring chronic stent placement, recently been exchanged-has had infection before  2. Underlying diabetes with proteinuria and hypertension-also multifactorial anemia    Recommendation/Plan  :     1. Okay to discharge of course from my standpoint  2. I would need to see him as an outpatient in 2 to 3 weeks-I will try to reintroduce renin-angiotensin aldosterone blocker with significant proteinuria as an outpatient  3. Low-salt, good glycemic control  4.  He will need a CMP, magnesium, phosphorus prior to appointment with me as well as CBC differential       Sandra Weber MD MD

## 2022-04-14 NOTE — PLAN OF CARE
Problem: Skin Integrity:  Goal: Will show no infection signs and symptoms  Description: Will show no infection signs and symptoms  4/14/2022 0024 by Jorje Gaytan LPN  Outcome: Ongoing  4/13/2022 1046 by Marj Benton LPN  Outcome: Ongoing  Goal: Absence of new skin breakdown  Description: Absence of new skin breakdown  4/14/2022 0024 by Jorje Gaytan LPN  Outcome: Ongoing  4/13/2022 1046 by Marj Benton LPN  Outcome: Ongoing     Problem: Infection:  Goal: Will remain free from infection  Description: Will remain free from infection  4/14/2022 0024 by Jorje Gaytan LPN  Outcome: Ongoing  4/13/2022 1046 by Marj Benton LPN  Outcome: Ongoing     Problem: Safety:  Goal: Free from accidental physical injury  Description: Free from accidental physical injury  4/14/2022 0024 by Jorje Gaytan LPN  Outcome: Ongoing  4/13/2022 1046 by Marj Benton LPN  Outcome: Ongoing  Goal: Free from intentional harm  Description: Free from intentional harm  4/14/2022 0024 by Jorje Gaytan LPN  Outcome: Ongoing  4/13/2022 1046 by Marj Benton LPN  Outcome: Ongoing     Problem: Daily Care:  Goal: Daily care needs are met  Description: Daily care needs are met  4/14/2022 0024 by Jorje Gaytan LPN  Outcome: Ongoing  4/13/2022 1046 by Marj Benton LPN  Outcome: Ongoing     Problem: Pain:  Goal: Patient's pain/discomfort is manageable  Description: Patient's pain/discomfort is manageable  4/14/2022 0024 by Jorje Gaytan LPN  Outcome: Ongoing  4/13/2022 1046 by Marj Benton LPN  Outcome: Ongoing  Goal: Pain level will decrease  Description: Pain level will decrease  4/14/2022 0024 by Jorje Gaytan LPN  Outcome: Ongoing  4/13/2022 1046 by Marj Benton LPN  Outcome: Ongoing  Goal: Control of acute pain  Description: Control of acute pain  4/14/2022 0024 by Jorje Gaytan LPN  Outcome: Ongoing  4/13/2022 1046 by Marj Benton LPN  Outcome: Ongoing  Goal: Control of chronic pain  Description: Control of chronic pain  4/14/2022 0024 by Katie Rodrigues LPN  Outcome: Ongoing  4/13/2022 1046 by Cody Becerra LPN  Outcome: Ongoing     Problem: Skin Integrity:  Goal: Skin integrity will stabilize  Description: Skin integrity will stabilize  4/14/2022 0024 by Katie Rodrigues LPN  Outcome: Ongoing  4/13/2022 1046 by Cody Becerra LPN  Outcome: Ongoing     Problem: Discharge Planning:  Goal: Patients continuum of care needs are met  Description: Patients continuum of care needs are met  4/14/2022 0024 by Katie Rodrigues LPN  Outcome: Ongoing  4/13/2022 1046 by Cody Becerra LPN  Outcome: Ongoing     Problem: Mobility - Impaired:  Goal: Mobility will improve  Description: Mobility will improve  4/14/2022 0024 by Katie Rodrigues LPN  Outcome: Ongoing  4/13/2022 1046 by Cody Becerra LPN  Outcome: Ongoing

## 2022-04-14 NOTE — PLAN OF CARE
Problem: Skin Integrity:  Goal: Will show no infection signs and symptoms  Description: Will show no infection signs and symptoms  4/14/2022 0024 by Monique Pop LPN  Outcome: Ongoing  4/14/2022 0024 by Monique Pop LPN  Outcome: Ongoing  4/13/2022 1046 by Valerie Esparza LPN  Outcome: Ongoing  Goal: Absence of new skin breakdown  Description: Absence of new skin breakdown  4/14/2022 0024 by Monique Pop LPN  Outcome: Ongoing  4/14/2022 0024 by Monique Pop LPN  Outcome: Ongoing  4/13/2022 1046 by Valerie Esparza LPN  Outcome: Ongoing     Problem: Infection:  Goal: Will remain free from infection  Description: Will remain free from infection  4/14/2022 0024 by Monique Pop LPN  Outcome: Ongoing  4/14/2022 0024 by Monique Pop LPN  Outcome: Ongoing  4/13/2022 1046 by Valerie Esparza LPN  Outcome: Ongoing     Problem: Safety:  Goal: Free from accidental physical injury  Description: Free from accidental physical injury  4/14/2022 0024 by Monique Pop LPN  Outcome: Ongoing  4/14/2022 0024 by Monique Pop LPN  Outcome: Ongoing  4/13/2022 1046 by Valerie Esparza LPN  Outcome: Ongoing  Goal: Free from intentional harm  Description: Free from intentional harm  4/14/2022 0024 by Monique Pop LPN  Outcome: Ongoing  4/14/2022 0024 by Monique Pop LPN  Outcome: Ongoing  4/13/2022 1046 by Valerie Esparza LPN  Outcome: Ongoing     Problem: Daily Care:  Goal: Daily care needs are met  Description: Daily care needs are met  4/14/2022 0024 by Monique Pop LPN  Outcome: Ongoing  4/14/2022 0024 by Monique Pop LPN  Outcome: Ongoing  4/13/2022 1046 by Valerie Esparza LPN  Outcome: Ongoing     Problem: Pain:  Goal: Patient's pain/discomfort is manageable  Description: Patient's pain/discomfort is manageable  4/14/2022 0024 by Monique Pop LPN  Outcome: Ongoing  4/14/2022 0024 by Monique Pop LPN  Outcome: Ongoing  4/13/2022 1046 by Valerie Esparza, LPN  Outcome: Ongoing  Goal: Pain level will decrease  Description: Pain level will decrease  4/14/2022 0024 by Rachel Better, LPN  Outcome: Ongoing  4/14/2022 0024 by Rachel Better, LPN  Outcome: Ongoing  4/13/2022 1046 by Najma Van LPN  Outcome: Ongoing  Goal: Control of acute pain  Description: Control of acute pain  4/14/2022 0024 by Rachel Better, LPN  Outcome: Ongoing  4/14/2022 0024 by Rachel Better, LPN  Outcome: Ongoing  4/13/2022 1046 by Najma Van LPN  Outcome: Ongoing  Goal: Control of chronic pain  Description: Control of chronic pain  4/14/2022 0024 by Rachel Better, LPN  Outcome: Ongoing  4/14/2022 0024 by Rachel Better, LPN  Outcome: Ongoing  4/13/2022 1046 by Najma Van LPN  Outcome: Ongoing     Problem: Skin Integrity:  Goal: Skin integrity will stabilize  Description: Skin integrity will stabilize  4/14/2022 0024 by Rachel Better, LPN  Outcome: Ongoing  4/14/2022 0024 by Rachel Better, LPN  Outcome: Ongoing  4/13/2022 1046 by Najma Van LPN  Outcome: Ongoing     Problem: Discharge Planning:  Goal: Patients continuum of care needs are met  Description: Patients continuum of care needs are met  4/14/2022 0024 by Rachel Better, LPN  Outcome: Ongoing  4/14/2022 0024 by Rachel Better, LPN  Outcome: Ongoing  4/13/2022 1046 by Najma Van LPN  Outcome: Ongoing     Problem: Mobility - Impaired:  Goal: Mobility will improve  Description: Mobility will improve  4/14/2022 0024 by Rachel Better, LPN  Outcome: Ongoing  4/14/2022 0024 by Rachel Better, LPN  Outcome: Ongoing  4/13/2022 1046 by Najma Van LPN  Outcome: Ongoing

## 2022-04-15 ENCOUNTER — HOSPITAL ENCOUNTER (OUTPATIENT)
Age: 82
Setting detail: SPECIMEN
Discharge: HOME | End: 2022-04-15

## 2022-04-15 LAB
ALBUMIN SERPL-MCNC: 3.2 GM/DL (ref 3.4–5)
ALP BLD-CCNC: 112 IU/L (ref 40–128)
ALT SERPL-CCNC: 14 U/L (ref 10–40)
ANION GAP SERPL CALCULATED.3IONS-SCNC: 13 MMOL/L (ref 4–16)
AST SERPL-CCNC: 18 IU/L (ref 15–37)
BILIRUB SERPL-MCNC: 0.2 MG/DL (ref 0–1)
BUN BLDV-MCNC: 48 MG/DL (ref 6–23)
CALCIUM SERPL-MCNC: 8.9 MG/DL (ref 8.3–10.6)
CHLORIDE BLD-SCNC: 107 MMOL/L (ref 99–110)
CO2: 22 MMOL/L (ref 21–32)
CREAT SERPL-MCNC: 1.9 MG/DL (ref 0.9–1.3)
ESTIMATED AVERAGE GLUCOSE: 212 MG/DL
GFR AFRICAN AMERICAN: 41 ML/MIN/1.73M2
GFR NON-AFRICAN AMERICAN: 34 ML/MIN/1.73M2
GLUCOSE BLD-MCNC: 173 MG/DL (ref 70–99)
HBA1C MFR BLD: 9 % (ref 4.2–6.3)
HCT VFR BLD CALC: 26.7 % (ref 42–52)
HEMOGLOBIN: 8.3 GM/DL (ref 13.5–18)
MCH RBC QN AUTO: 30.1 PG (ref 27–31)
MCHC RBC AUTO-ENTMCNC: 31.1 % (ref 32–36)
MCV RBC AUTO: 96.7 FL (ref 78–100)
PDW BLD-RTO: 16.9 % (ref 11.7–14.9)
PLATELET # BLD: 203 K/CU MM (ref 140–440)
PMV BLD AUTO: 12.7 FL (ref 7.5–11.1)
POTASSIUM SERPL-SCNC: 4.5 MMOL/L (ref 3.5–5.1)
RBC # BLD: 2.76 M/CU MM (ref 4.6–6.2)
SODIUM BLD-SCNC: 142 MMOL/L (ref 135–145)
TOTAL PROTEIN: 5.9 GM/DL (ref 6.4–8.2)
WBC # BLD: 8.3 K/CU MM (ref 4–10.5)

## 2022-04-15 PROCEDURE — 36415 COLL VENOUS BLD VENIPUNCTURE: CPT

## 2022-04-15 PROCEDURE — 80053 COMPREHEN METABOLIC PANEL: CPT

## 2022-04-15 PROCEDURE — 83036 HEMOGLOBIN GLYCOSYLATED A1C: CPT

## 2022-04-15 PROCEDURE — 85027 COMPLETE CBC AUTOMATED: CPT

## 2022-04-15 NOTE — PROGRESS NOTES
Progress Note( Dr. Desiree Monroe)  4/14/2022  Subjective:   Admit Date: 3/30/2022  PCP: Juancho Dunne MD    Admitted For : Patient was initially admitted to medical floor for sepsis from UTI and generalized  weakness    Consulted For: Better control of blood glucose    Interval History: Patient was transferred from medical floor on 3/30/2022 and was admitted to acute rehab unit for rehabilitation and physical therapy  Blood sugar control is better    Patient seem to be more awake and alert  Somewhat  seem to be confused    Denies any chest pains,   Denies SOB . Denies nausea or vomiting. No new bowel or bladder symptoms. Intake/Output Summary (Last 24 hours) at 4/14/2022 2114  Last data filed at 4/14/2022 1256  Gross per 24 hour   Intake 1280 ml   Output 360 ml   Net 920 ml       DATA    CBC:   No results for input(s): WBC, HGB, PLT in the last 72 hours. CMP:  Recent Labs     04/12/22  0658      K 4.6      CO2 23   BUN 60*   CREATININE 2.0*   CALCIUM 9.1   LABALBU 3.2*     Lipids: No results found for: CHOL, HDL, TRIG  Glucose:  Recent Labs     04/14/22  0128 04/14/22  0738 04/14/22  1139   POCGLU 93 92 129*     IjqeoaonbtT3P:  Lab Results   Component Value Date    LABA1C 9.2 03/22/2022     High Sensitivity TSH:   Lab Results   Component Value Date    TSHHS 6.190 03/25/2022     Free T3: No results found for: FT3  Free T4:  Lab Results   Component Value Date    T4FREE 1.15 03/27/2022       XR CHEST PORTABLE   Final Result   No acute cardiopulmonary abnormality. Favor mild soft tissue prominence over the left lung base less likely trace   pleural effusion or consolidation. Attention with a follow-up lateral view   if clinically indicated.               Scheduled Medicines   Medications:      Infusions:         Objective:   Vitals: BP (!) 146/76   Pulse 80   Temp 97.1 °F (36.2 °C) (Oral)   Resp 16   Ht 5' 8\" (1.727 m)   Wt 194 lb 10.7 oz (88.3 kg)   SpO2 100%   BMI 29.60 kg/m² General appearance: alert and cooperative with exam  Neck: no JVD or bruit  Thyroid : Normal lobes   Lungs: Has Vesicular Breath sounds   Heart:  regular rate and rhythm  Abdomen: soft, non-tender; bowel sounds normal; no masses,  no organomegaly has Surapubic Catheter   Musculoskeletal: Normal  Extremities: extremities normal, , no edema  Neurologic:  Awake, alert, oriented to name, place and time. Cranial nerves II-XII are grossly intact. Motor weakness . Sensory is intact. ,  and gait is abnormal.unstable     Assessment:     Patient Active Problem List:     Gait disturbance     Generalized weakness     Diabetes mellitus (HCC)     Osteoarthritis     Anemia of chronic disorder     Infected implanted bladder sphincter cuff     Escherichia coli urinary tract infection     Hypertension     Sepsis ([address]     Type 2 diabetes mellitus with hypoglycemia without coma ([address]     UTI (urinary tract infection) due to urinary indwelling catheter (HCC)     Hyperkalemia     Acute kidney failure (HCC)     Leg weakness, bilateral     Type 2 diabetes mellitus with neurological manifestations, uncontrolled ([address]     Complicated UTI (urinary tract infection)     Essential hypertension     Severe muscle deconditioning     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 (HCC)     Frequent falls     Chronic kidney disease, stage 3a ([address]     Uncontrolled type 2 diabetes mellitus with peripheral neuropathy (HCC)     Prostate cancer ([address]     Suprapubic catheter ([address]     Sepsis secondary to UTI ([address]      Plan:     1. Reviewed POC blood glucose . Labs and X ray results   2. Reviewed Current Medicines   3. On m Correction bolus Humalog/ Basal Lantus Insulin. 4. Monitor Blood glucose frequently   5. Modified  the dose of Insulin/ other medicines as needed  6. On low-dose Synthroid of 50 mg/day  7. Patient was transferred to acute rehab unit on 3/30//2022   8.  Patient to be participating in physical therapy and Occupational Therapy of acute rehab unit  9. Will follow     .      Bhavya Baez MD, MD

## 2022-04-18 ENCOUNTER — APPOINTMENT (OUTPATIENT)
Dept: CT IMAGING | Age: 82
DRG: 698 | End: 2022-04-18
Payer: MEDICARE

## 2022-04-18 ENCOUNTER — HOSPITAL ENCOUNTER (OUTPATIENT)
Age: 82
Setting detail: SPECIMEN
Discharge: HOME | End: 2022-04-18

## 2022-04-18 ENCOUNTER — HOSPITAL ENCOUNTER (INPATIENT)
Age: 82
LOS: 4 days | Discharge: NURSING, REHAB OR LONG-TERM CARE | DRG: 698 | End: 2022-04-22
Attending: EMERGENCY MEDICINE | Admitting: INTERNAL MEDICINE
Payer: MEDICARE

## 2022-04-18 ENCOUNTER — APPOINTMENT (OUTPATIENT)
Dept: GENERAL RADIOLOGY | Age: 82
DRG: 698 | End: 2022-04-18
Payer: MEDICARE

## 2022-04-18 DIAGNOSIS — E87.0 HYPERNATREMIA: ICD-10-CM

## 2022-04-18 DIAGNOSIS — N18.9 CHRONIC RENAL IMPAIRMENT, UNSPECIFIED CKD STAGE: ICD-10-CM

## 2022-04-18 DIAGNOSIS — R82.81 PYURIA: ICD-10-CM

## 2022-04-18 DIAGNOSIS — Z96.0 URETERAL STENT RETAINED: ICD-10-CM

## 2022-04-18 DIAGNOSIS — E16.2 HYPOGLYCEMIA: ICD-10-CM

## 2022-04-18 DIAGNOSIS — R41.82 ALTERED MENTAL STATUS, UNSPECIFIED ALTERED MENTAL STATUS TYPE: Primary | ICD-10-CM

## 2022-04-18 DIAGNOSIS — I95.9 HYPOTENSION, UNSPECIFIED HYPOTENSION TYPE: ICD-10-CM

## 2022-04-18 LAB
ALBUMIN SERPL-MCNC: 3.4 GM/DL (ref 3.4–5)
ALP BLD-CCNC: 98 IU/L (ref 40–129)
ALT SERPL-CCNC: 30 U/L (ref 10–40)
ANION GAP SERPL CALCULATED.3IONS-SCNC: 10 MMOL/L (ref 4–16)
ANION GAP SERPL CALCULATED.3IONS-SCNC: 11 MMOL/L (ref 4–16)
APTT: 33.6 SECONDS (ref 25.1–37.1)
AST SERPL-CCNC: 36 IU/L (ref 15–37)
BACTERIA: NEGATIVE /HPF
BASOPHILS ABSOLUTE: 0 K/CU MM
BASOPHILS RELATIVE PERCENT: 0.4 % (ref 0–1)
BILIRUB SERPL-MCNC: 0.2 MG/DL (ref 0–1)
BILIRUBIN URINE: NEGATIVE MG/DL
BLOOD, URINE: ABNORMAL
BUN BLDV-MCNC: 53 MG/DL (ref 6–23)
BUN BLDV-MCNC: 54 MG/DL (ref 6–23)
CALCIUM SERPL-MCNC: 8.8 MG/DL (ref 8.3–10.6)
CALCIUM SERPL-MCNC: 9.5 MG/DL (ref 8.3–10.6)
CHLORIDE BLD-SCNC: 109 MMOL/L (ref 99–110)
CHLORIDE BLD-SCNC: 110 MMOL/L (ref 99–110)
CLARITY: ABNORMAL
CO2: 24 MMOL/L (ref 21–32)
CO2: 26 MMOL/L (ref 21–32)
COLOR: YELLOW
CREAT SERPL-MCNC: 2.3 MG/DL (ref 0.9–1.3)
CREAT SERPL-MCNC: 2.4 MG/DL (ref 0.9–1.3)
DIFFERENTIAL TYPE: ABNORMAL
EOSINOPHILS ABSOLUTE: 0.5 K/CU MM
EOSINOPHILS RELATIVE PERCENT: 5 % (ref 0–3)
GFR AFRICAN AMERICAN: 31 ML/MIN/1.73M2
GFR AFRICAN AMERICAN: 33 ML/MIN/1.73M2
GFR NON-AFRICAN AMERICAN: 26 ML/MIN/1.73M2
GFR NON-AFRICAN AMERICAN: 27 ML/MIN/1.73M2
GLUCOSE BLD-MCNC: 128 MG/DL (ref 70–99)
GLUCOSE BLD-MCNC: 134 MG/DL (ref 70–99)
GLUCOSE BLD-MCNC: 138 MG/DL (ref 70–99)
GLUCOSE BLD-MCNC: 142 MG/DL (ref 70–99)
GLUCOSE BLD-MCNC: 150 MG/DL (ref 70–99)
GLUCOSE BLD-MCNC: 16 MG/DL (ref 70–99)
GLUCOSE BLD-MCNC: 186 MG/DL (ref 70–99)
GLUCOSE BLD-MCNC: 191 MG/DL (ref 70–99)
GLUCOSE BLD-MCNC: 23 MG/DL (ref 70–99)
GLUCOSE BLD-MCNC: 262 MG/DL (ref 70–99)
GLUCOSE BLD-MCNC: 289 MG/DL (ref 70–99)
GLUCOSE BLD-MCNC: 53 MG/DL (ref 70–99)
GLUCOSE BLD-MCNC: 69 MG/DL (ref 70–99)
GLUCOSE BLD-MCNC: 80 MG/DL (ref 70–99)
GLUCOSE BLD-MCNC: 85 MG/DL (ref 70–99)
GLUCOSE, URINE: NEGATIVE MG/DL
HCT VFR BLD CALC: 26.1 % (ref 42–52)
HCT VFR BLD CALC: 30.3 % (ref 42–52)
HEMOGLOBIN: 7.7 GM/DL (ref 13.5–18)
HEMOGLOBIN: 8.8 GM/DL (ref 13.5–18)
IMMATURE NEUTROPHIL %: 0.4 % (ref 0–0.43)
INR BLD: 0.97 INDEX
KETONES, URINE: NEGATIVE MG/DL
LACTATE: 1.1 MMOL/L (ref 0.4–2)
LEUKOCYTE ESTERASE, URINE: ABNORMAL
LYMPHOCYTES ABSOLUTE: 2.1 K/CU MM
LYMPHOCYTES RELATIVE PERCENT: 21.2 % (ref 24–44)
MCH RBC QN AUTO: 29.4 PG (ref 27–31)
MCH RBC QN AUTO: 29.6 PG (ref 27–31)
MCHC RBC AUTO-ENTMCNC: 29 % (ref 32–36)
MCHC RBC AUTO-ENTMCNC: 29.5 % (ref 32–36)
MCV RBC AUTO: 102 FL (ref 78–100)
MCV RBC AUTO: 99.6 FL (ref 78–100)
MONOCYTES ABSOLUTE: 0.7 K/CU MM
MONOCYTES RELATIVE PERCENT: 7.4 % (ref 0–4)
NITRITE URINE, QUANTITATIVE: NEGATIVE
NUCLEATED RBC %: 0 %
PDW BLD-RTO: 17 % (ref 11.7–14.9)
PDW BLD-RTO: 17.1 % (ref 11.7–14.9)
PH, URINE: 5.5 (ref 5–8)
PLATELET # BLD: 185 K/CU MM (ref 140–440)
PLATELET # BLD: 198 K/CU MM (ref 140–440)
PMV BLD AUTO: 12.3 FL (ref 7.5–11.1)
PMV BLD AUTO: 13 FL (ref 7.5–11.1)
POTASSIUM SERPL-SCNC: 4.4 MMOL/L (ref 3.5–5.1)
POTASSIUM SERPL-SCNC: 5.3 MMOL/L (ref 3.5–5.1)
PRO-BNP: 498.2 PG/ML
PROTEIN UA: 30 MG/DL
PROTHROMBIN TIME: 12.5 SECONDS (ref 11.7–14.5)
RBC # BLD: 2.62 M/CU MM (ref 4.6–6.2)
RBC # BLD: 2.97 M/CU MM (ref 4.6–6.2)
RBC URINE: 36 /HPF (ref 0–3)
SARS-COV-2, NAAT: NOT DETECTED
SEGMENTED NEUTROPHILS ABSOLUTE COUNT: 6.5 K/CU MM
SEGMENTED NEUTROPHILS RELATIVE PERCENT: 65.6 % (ref 36–66)
SODIUM BLD-SCNC: 144 MMOL/L (ref 135–145)
SODIUM BLD-SCNC: 146 MMOL/L (ref 135–145)
SOURCE: NORMAL
SPECIFIC GRAVITY UA: 1.02 (ref 1–1.03)
SQUAMOUS EPITHELIAL: 2 /HPF
TOTAL IMMATURE NEUTOROPHIL: 0.04 K/CU MM
TOTAL NUCLEATED RBC: 0 K/CU MM
TOTAL PROTEIN: 7.2 GM/DL (ref 6.4–8.2)
TRICHOMONAS: ABNORMAL /HPF
TROPONIN T: 0.04 NG/ML
UROBILINOGEN, URINE: 0.2 MG/DL (ref 0.2–1)
WBC # BLD: 8 K/CU MM (ref 4–10.5)
WBC # BLD: 9.9 K/CU MM (ref 4–10.5)
WBC CLUMP: ABNORMAL /HPF
WBC UA: 1142 /HPF (ref 0–2)

## 2022-04-18 PROCEDURE — 85027 COMPLETE CBC AUTOMATED: CPT

## 2022-04-18 PROCEDURE — 2500000003 HC RX 250 WO HCPCS: Performed by: EMERGENCY MEDICINE

## 2022-04-18 PROCEDURE — 87186 SC STD MICRODIL/AGAR DIL: CPT

## 2022-04-18 PROCEDURE — 80053 COMPREHEN METABOLIC PANEL: CPT

## 2022-04-18 PROCEDURE — 93005 ELECTROCARDIOGRAM TRACING: CPT | Performed by: INTERNAL MEDICINE

## 2022-04-18 PROCEDURE — 70450 CT HEAD/BRAIN W/O DYE: CPT

## 2022-04-18 PROCEDURE — 83880 ASSAY OF NATRIURETIC PEPTIDE: CPT

## 2022-04-18 PROCEDURE — 96365 THER/PROPH/DIAG IV INF INIT: CPT

## 2022-04-18 PROCEDURE — 2580000003 HC RX 258

## 2022-04-18 PROCEDURE — 6360000004 HC RX CONTRAST MEDICATION: Performed by: EMERGENCY MEDICINE

## 2022-04-18 PROCEDURE — 87635 SARS-COV-2 COVID-19 AMP PRB: CPT

## 2022-04-18 PROCEDURE — 85025 COMPLETE CBC W/AUTO DIFF WBC: CPT

## 2022-04-18 PROCEDURE — 2580000003 HC RX 258: Performed by: EMERGENCY MEDICINE

## 2022-04-18 PROCEDURE — 71045 X-RAY EXAM CHEST 1 VIEW: CPT

## 2022-04-18 PROCEDURE — 96361 HYDRATE IV INFUSION ADD-ON: CPT

## 2022-04-18 PROCEDURE — 84484 ASSAY OF TROPONIN QUANT: CPT

## 2022-04-18 PROCEDURE — 87086 URINE CULTURE/COLONY COUNT: CPT

## 2022-04-18 PROCEDURE — 6360000002 HC RX W HCPCS: Performed by: EMERGENCY MEDICINE

## 2022-04-18 PROCEDURE — 2580000003 HC RX 258: Performed by: INTERNAL MEDICINE

## 2022-04-18 PROCEDURE — 81001 URINALYSIS AUTO W/SCOPE: CPT

## 2022-04-18 PROCEDURE — 83605 ASSAY OF LACTIC ACID: CPT

## 2022-04-18 PROCEDURE — 99285 EMERGENCY DEPT VISIT HI MDM: CPT

## 2022-04-18 PROCEDURE — 87077 CULTURE AEROBIC IDENTIFY: CPT

## 2022-04-18 PROCEDURE — 2060000000 HC ICU INTERMEDIATE R&B

## 2022-04-18 PROCEDURE — 93005 ELECTROCARDIOGRAM TRACING: CPT | Performed by: EMERGENCY MEDICINE

## 2022-04-18 PROCEDURE — 80048 BASIC METABOLIC PNL TOTAL CA: CPT

## 2022-04-18 PROCEDURE — 6370000000 HC RX 637 (ALT 250 FOR IP): Performed by: INTERNAL MEDICINE

## 2022-04-18 PROCEDURE — 70496 CT ANGIOGRAPHY HEAD: CPT

## 2022-04-18 PROCEDURE — 82962 GLUCOSE BLOOD TEST: CPT

## 2022-04-18 PROCEDURE — 85610 PROTHROMBIN TIME: CPT

## 2022-04-18 PROCEDURE — 74177 CT ABD & PELVIS W/CONTRAST: CPT

## 2022-04-18 PROCEDURE — 82140 ASSAY OF AMMONIA: CPT

## 2022-04-18 PROCEDURE — 36415 COLL VENOUS BLD VENIPUNCTURE: CPT

## 2022-04-18 PROCEDURE — 85730 THROMBOPLASTIN TIME PARTIAL: CPT

## 2022-04-18 RX ORDER — DEXTROSE MONOHYDRATE 25 G/50ML
25 INJECTION, SOLUTION INTRAVENOUS ONCE
Status: COMPLETED | OUTPATIENT
Start: 2022-04-18 | End: 2022-04-18

## 2022-04-18 RX ORDER — INSULIN GLARGINE 100 [IU]/ML
20 INJECTION, SOLUTION SUBCUTANEOUS NIGHTLY
Status: DISCONTINUED | OUTPATIENT
Start: 2022-04-18 | End: 2022-04-19

## 2022-04-18 RX ORDER — LISINOPRIL 5 MG/1
5 TABLET ORAL DAILY
Status: ON HOLD | COMMUNITY
End: 2022-04-22 | Stop reason: HOSPADM

## 2022-04-18 RX ORDER — DEXTROSE MONOHYDRATE 50 MG/ML
INJECTION, SOLUTION INTRAVENOUS CONTINUOUS
Status: DISCONTINUED | OUTPATIENT
Start: 2022-04-18 | End: 2022-04-19

## 2022-04-18 RX ORDER — IPRATROPIUM BROMIDE AND ALBUTEROL SULFATE 2.5; .5 MG/3ML; MG/3ML
1 SOLUTION RESPIRATORY (INHALATION) 2 TIMES DAILY
Status: ON HOLD | COMMUNITY

## 2022-04-18 RX ORDER — PREGABALIN 50 MG/1
50 CAPSULE ORAL 2 TIMES DAILY
Status: DISCONTINUED | OUTPATIENT
Start: 2022-04-18 | End: 2022-04-23 | Stop reason: HOSPADM

## 2022-04-18 RX ORDER — AMLODIPINE BESYLATE 5 MG/1
5 TABLET ORAL DAILY
Status: ON HOLD | COMMUNITY

## 2022-04-18 RX ORDER — MAGNESIUM SULFATE IN WATER 40 MG/ML
2000 INJECTION, SOLUTION INTRAVENOUS PRN
Status: DISCONTINUED | OUTPATIENT
Start: 2022-04-18 | End: 2022-04-23 | Stop reason: HOSPADM

## 2022-04-18 RX ORDER — OXYBUTYNIN CHLORIDE 10 MG/1
10 TABLET, EXTENDED RELEASE ORAL DAILY
Status: ON HOLD | COMMUNITY

## 2022-04-18 RX ORDER — ATORVASTATIN CALCIUM 40 MG/1
40 TABLET, FILM COATED ORAL NIGHTLY
Status: DISCONTINUED | OUTPATIENT
Start: 2022-04-18 | End: 2022-04-23 | Stop reason: HOSPADM

## 2022-04-18 RX ORDER — POLYETHYLENE GLYCOL 3350 17 G/17G
17 POWDER, FOR SOLUTION ORAL DAILY PRN
Status: DISCONTINUED | OUTPATIENT
Start: 2022-04-18 | End: 2022-04-23 | Stop reason: HOSPADM

## 2022-04-18 RX ORDER — BISACODYL 10 MG
10 SUPPOSITORY, RECTAL RECTAL DAILY
Status: ON HOLD | COMMUNITY

## 2022-04-18 RX ORDER — FLUCONAZOLE 2 MG/ML
100 INJECTION, SOLUTION INTRAVENOUS ONCE
Status: COMPLETED | OUTPATIENT
Start: 2022-04-18 | End: 2022-04-18

## 2022-04-18 RX ORDER — LIDOCAINE 50 MG/G
1 PATCH TOPICAL DAILY
Status: ON HOLD | COMMUNITY

## 2022-04-18 RX ORDER — INSULIN GLARGINE 100 [IU]/ML
40 INJECTION, SOLUTION SUBCUTANEOUS EVERY MORNING
Status: DISCONTINUED | OUTPATIENT
Start: 2022-04-19 | End: 2022-04-19

## 2022-04-18 RX ORDER — DEXTROSE MONOHYDRATE 100 MG/ML
INJECTION, SOLUTION INTRAVENOUS ONCE
Status: DISCONTINUED | OUTPATIENT
Start: 2022-04-18 | End: 2022-04-18

## 2022-04-18 RX ORDER — DOCUSATE SODIUM 100 MG/1
100 CAPSULE, LIQUID FILLED ORAL 2 TIMES DAILY PRN
Status: DISCONTINUED | OUTPATIENT
Start: 2022-04-18 | End: 2022-04-23 | Stop reason: HOSPADM

## 2022-04-18 RX ORDER — SODIUM CHLORIDE 0.9 % (FLUSH) 0.9 %
5-40 SYRINGE (ML) INJECTION EVERY 12 HOURS SCHEDULED
Status: DISCONTINUED | OUTPATIENT
Start: 2022-04-18 | End: 2022-04-23 | Stop reason: HOSPADM

## 2022-04-18 RX ORDER — DEXTROSE MONOHYDRATE 25 G/50ML
25 INJECTION, SOLUTION INTRAVENOUS ONCE
Status: DISCONTINUED | OUTPATIENT
Start: 2022-04-18 | End: 2022-04-18

## 2022-04-18 RX ORDER — ASPIRIN 81 MG/1
81 TABLET, CHEWABLE ORAL DAILY
Status: DISCONTINUED | OUTPATIENT
Start: 2022-04-19 | End: 2022-04-18

## 2022-04-18 RX ORDER — DEXTROSE MONOHYDRATE 100 MG/ML
INJECTION, SOLUTION INTRAVENOUS
Status: COMPLETED
Start: 2022-04-18 | End: 2022-04-18

## 2022-04-18 RX ORDER — DEXTROSE MONOHYDRATE 100 MG/ML
INJECTION, SOLUTION INTRAVENOUS CONTINUOUS
Status: DISCONTINUED | OUTPATIENT
Start: 2022-04-18 | End: 2022-04-18

## 2022-04-18 RX ORDER — ACETAMINOPHEN 325 MG/1
650 TABLET ORAL EVERY 6 HOURS PRN
Status: DISCONTINUED | OUTPATIENT
Start: 2022-04-18 | End: 2022-04-23 | Stop reason: HOSPADM

## 2022-04-18 RX ORDER — TRAMADOL HYDROCHLORIDE 50 MG/1
25 TABLET ORAL EVERY 6 HOURS PRN
Status: DISCONTINUED | OUTPATIENT
Start: 2022-04-18 | End: 2022-04-23 | Stop reason: HOSPADM

## 2022-04-18 RX ORDER — ACETAMINOPHEN 325 MG/1
650 TABLET ORAL EVERY 6 HOURS PRN
Status: DISCONTINUED | OUTPATIENT
Start: 2022-04-18 | End: 2022-04-18 | Stop reason: SDUPTHER

## 2022-04-18 RX ORDER — INSULIN GLARGINE 100 [IU]/ML
20 INJECTION, SOLUTION SUBCUTANEOUS NIGHTLY
Status: ON HOLD | COMMUNITY
End: 2022-04-22 | Stop reason: HOSPADM

## 2022-04-18 RX ORDER — INSULIN GLARGINE 100 [IU]/ML
40 INJECTION, SOLUTION SUBCUTANEOUS DAILY
Status: ON HOLD | COMMUNITY
End: 2022-04-22 | Stop reason: HOSPADM

## 2022-04-18 RX ORDER — POLYETHYLENE GLYCOL 3350 17 G/17G
17 POWDER, FOR SOLUTION ORAL 2 TIMES DAILY
Status: ON HOLD | COMMUNITY

## 2022-04-18 RX ORDER — SODIUM CHLORIDE 0.9 % (FLUSH) 0.9 %
5-40 SYRINGE (ML) INJECTION PRN
Status: DISCONTINUED | OUTPATIENT
Start: 2022-04-18 | End: 2022-04-23 | Stop reason: HOSPADM

## 2022-04-18 RX ORDER — LEVOTHYROXINE SODIUM 0.03 MG/1
50 TABLET ORAL DAILY
Status: DISCONTINUED | OUTPATIENT
Start: 2022-04-19 | End: 2022-04-23 | Stop reason: HOSPADM

## 2022-04-18 RX ORDER — POTASSIUM CHLORIDE 7.45 MG/ML
10 INJECTION INTRAVENOUS PRN
Status: DISCONTINUED | OUTPATIENT
Start: 2022-04-18 | End: 2022-04-23 | Stop reason: HOSPADM

## 2022-04-18 RX ORDER — NICOTINE POLACRILEX 4 MG
15 LOZENGE BUCCAL PRN
Status: ON HOLD | COMMUNITY

## 2022-04-18 RX ORDER — SODIUM CHLORIDE 9 MG/ML
INJECTION, SOLUTION INTRAVENOUS PRN
Status: DISCONTINUED | OUTPATIENT
Start: 2022-04-18 | End: 2022-04-23 | Stop reason: HOSPADM

## 2022-04-18 RX ORDER — SODIUM BICARBONATE 650 MG/1
650 TABLET ORAL 3 TIMES DAILY
Status: DISCONTINUED | OUTPATIENT
Start: 2022-04-18 | End: 2022-04-23 | Stop reason: HOSPADM

## 2022-04-18 RX ORDER — DEXTROSE MONOHYDRATE 25 G/50ML
12.5 INJECTION, SOLUTION INTRAVENOUS PRN
Status: DISCONTINUED | OUTPATIENT
Start: 2022-04-18 | End: 2022-04-18 | Stop reason: CLARIF

## 2022-04-18 RX ORDER — ALLOPURINOL 100 MG/1
100 TABLET ORAL DAILY
Status: DISCONTINUED | OUTPATIENT
Start: 2022-04-19 | End: 2022-04-23 | Stop reason: HOSPADM

## 2022-04-18 RX ORDER — POTASSIUM CHLORIDE 20 MEQ/1
40 TABLET, EXTENDED RELEASE ORAL PRN
Status: DISCONTINUED | OUTPATIENT
Start: 2022-04-18 | End: 2022-04-23 | Stop reason: HOSPADM

## 2022-04-18 RX ORDER — ONDANSETRON 2 MG/ML
4 INJECTION INTRAMUSCULAR; INTRAVENOUS EVERY 6 HOURS PRN
Status: DISCONTINUED | OUTPATIENT
Start: 2022-04-18 | End: 2022-04-23 | Stop reason: HOSPADM

## 2022-04-18 RX ORDER — ACETAMINOPHEN 650 MG/1
650 SUPPOSITORY RECTAL EVERY 6 HOURS PRN
Status: DISCONTINUED | OUTPATIENT
Start: 2022-04-18 | End: 2022-04-23 | Stop reason: HOSPADM

## 2022-04-18 RX ORDER — ONDANSETRON 4 MG/1
4 TABLET, ORALLY DISINTEGRATING ORAL EVERY 8 HOURS PRN
Status: DISCONTINUED | OUTPATIENT
Start: 2022-04-18 | End: 2022-04-23 | Stop reason: HOSPADM

## 2022-04-18 RX ORDER — DEXTROSE MONOHYDRATE 50 MG/ML
100 INJECTION, SOLUTION INTRAVENOUS PRN
Status: DISCONTINUED | OUTPATIENT
Start: 2022-04-18 | End: 2022-04-23 | Stop reason: HOSPADM

## 2022-04-18 RX ORDER — CARVEDILOL 6.25 MG/1
3.12 TABLET ORAL 2 TIMES DAILY WITH MEALS
Status: DISCONTINUED | OUTPATIENT
Start: 2022-04-18 | End: 2022-04-23 | Stop reason: HOSPADM

## 2022-04-18 RX ADMIN — PREGABALIN 50 MG: 50 CAPSULE ORAL at 21:25

## 2022-04-18 RX ADMIN — DEXTROSE MONOHYDRATE 25 G: 25 INJECTION, SOLUTION INTRAVENOUS at 16:45

## 2022-04-18 RX ADMIN — ATORVASTATIN CALCIUM 40 MG: 40 TABLET, FILM COATED ORAL at 21:25

## 2022-04-18 RX ADMIN — DEXTROSE MONOHYDRATE 250 ML: 100 INJECTION, SOLUTION INTRAVENOUS at 09:54

## 2022-04-18 RX ADMIN — SODIUM BICARBONATE 650 MG: 650 TABLET ORAL at 21:25

## 2022-04-18 RX ADMIN — FLUCONAZOLE 100 MG: 200 INJECTION, SOLUTION INTRAVENOUS at 17:13

## 2022-04-18 RX ADMIN — IOPAMIDOL 85 ML: 755 INJECTION, SOLUTION INTRAVENOUS at 12:11

## 2022-04-18 RX ADMIN — CEFTRIAXONE 1000 MG: 1 INJECTION, POWDER, FOR SOLUTION INTRAMUSCULAR; INTRAVENOUS at 14:35

## 2022-04-18 RX ADMIN — DEXTROSE MONOHYDRATE: 50 INJECTION, SOLUTION INTRAVENOUS at 18:32

## 2022-04-18 RX ADMIN — SODIUM CHLORIDE, PRESERVATIVE FREE 10 ML: 5 INJECTION INTRAVENOUS at 21:27

## 2022-04-18 ASSESSMENT — PAIN SCALES - GENERAL
PAINLEVEL_OUTOF10: 0

## 2022-04-18 NOTE — ED NOTES
2 amps of D50% given. Dr. Jonathon Hardy at bedside.       Joanne Mata, RN  04/18/22 [address], RN  04/18/22 [id number]

## 2022-04-18 NOTE — ED PROVIDER NOTES
Emergency Department Encounter    Patient: Fatemeh Whiting  MRN: [id number]  : 1940  Date of Evaluation: 2022  ED Provider:  Kaleb Wilson MD    Critical Care: There is a high probability of clinically significant and life or limb altering change in the patient's condition that requires my immediate attention and intervention. Total critical care time not including separately reportable procedures is at least 35 minutes. Triage Chief Complaint:   Altered Mental Status      [place]:  Fatemeh Whiting is a 78 y.o. male that presents to the emergency department with altered mental status. Available history is provided by EMS. Patient is a resident of the [facility], and nursing reports EMS that patient has been altered since about 4 PM yesterday. He has been less responsive and less active. There is no report of witnessed seizure activity or injury. There has been no reported focal weakness or ataxia. The symptoms appear constant in timing. EMS requested a stroke alert prior to arrival.  EMS also reports a blood sugar in the 40s. Glucagon 1 mg was given without change in mental status upon arrival patient does answer some basic yes or no questions appropriately. He denies any chest or abdominal pain but grimaces during abdominal exam.  He is unable to further qualify the pain or report any modifying factors. Patient reports no other particular provocative or alleviating factors. EMS report and nursing home packet are consistent. CODE STATUS is full code. ROS within confines of the available report- see HPI, below listed is current ROS at time of my eval:  CONSTITUTIONAL: No fevers, chills, or sweats. EYES: No eye pain or drainage. HENT: No difficulty swallowing. RESPIRATORY: No shortness of breath, cough, or sputum production. CARDIOVASCULAR: No anginal-type chest pain, orthopnea, or edema. GASTROINTESTINAL: No nausea, vomiting, or abdominal pain.   GENITOURINARY: None reported. MUSCULOSKELETAL: No recent injury. No neck, back, or extremity pain. NEUROLOGICAL: No focal weakness, numbness, or tingling. SKIN: No rashes or other lesions reported. No yellowing of the skin. Medical history:  Past Medical History:   Diagnosis Date    Acute urinary tract infection 3/15/2012    Ataxia 2010    Diabetes mellitus ([facility] [address] 2011    type 2, controlled    Fusion of spine of cervical region 10/2012    Gait disturbance     History of prostate cancer     adenocarcinoma    History of tobacco use     Hyperlipidemia     Osteoarthritis      Past Surgical History:   Procedure Laterality Date    BLADDER SURGERY      BLADDER SURGERY Left 3/17/2022    CYSTOSCOPY LEFT STENT EXCHANGE performed by Zack Gilliam MD at [place] [address]  3/28/13    Cysto with removal of artificial sphinter and placement of suprapubic catheter.     PROSTATE SURGERY      PROSTATECTOMY      infection     Family History   Problem Relation Age of Onset   Wolfe Cancer Mother     Diabetes Father     Heart Disease Father     High Blood Pressure Father     Diabetes Sister     High Blood Pressure Sister     Cancer Brother         prostate, breast    Diabetes Brother     Cancer Maternal Uncle     Diabetes Maternal Grandmother     Stroke Maternal Grandfather      Social History     Socioeconomic History    Marital status:      Spouse name: Not on file    Number of children: 3    Years of education: Not on file    Highest education level: Not on file   Occupational History    Not on file   Tobacco Use    Smoking status: Former Smoker     Packs/day: 0.50     Quit date: 1976     Years since quittin.8    Smokeless tobacco: Never Used   Vaping Use    Vaping Use: Not on file   Substance and Sexual Activity    Alcohol use: No     Comment: Quit in     Drug use: No    Sexual activity: Not on file   Other Topics Concern    Not on file   Social History Narrative    Not on file     Social Determinants of Health     Financial Resource Strain:     Difficulty of Paying Living Expenses: Not on file   Food Insecurity:     Worried About Running Out of Food in the Last Year: Not on file    [place] of Food in the Last Year: Not on file   Transportation Needs:     Lack of Transportation (Medical): Not on file    Lack of Transportation (Non-Medical):  Not on file   Physical Activity:     Days of Exercise per Week: Not on file    Minutes of Exercise per Session: Not on file   Stress:     Feeling of Stress : Not on file   Social Connections:     Frequency of Communication with Friends and Family: Not on file    Frequency of Social Gatherings with Friends and Family: Not on file    Attends Congregation Services: Not on file    Active Member of [address] [place] [address] Personal [employer] Systems or Organizations: Not on file    Attends Club or Organization Meetings: Not on file    Marital Status: Not on file   Intimate Partner Violence:     Fear of Current or Ex-Partner: Not on file    Emotionally Abused: Not on file    Physically Abused: Not on file    Sexually Abused: Not on file   Housing Stability:     Unable to Pay for Housing in the Last Year: Not on file    Number of [place] in the Last Year: Not on file    Unstable Housing in the Last Year: Not on file     Current Facility-Administered Medications   Medication Dose Route Frequency Provider Last Rate Last Admin    dextrose 10 % infusion   IntraVENous Continuous Selena Rouse  mL/hr at 04/19/22 0626 New Bag at 04/19/22 0626    docusate sodium (COLACE) capsule 100 mg  100 mg Oral BID PRN Wilbur Rush MD        allopurinol (ZYLOPRIM) tablet 100 mg  100 mg Oral Daily Wilbur Rush MD        traMADol (ULTRAM) tablet 25 mg  25 mg Oral Q6H PRN Wilbur Rush MD        sodium bicarbonate tablet 650 mg  650 mg Oral TID Wilbur Rush MD   650 mg at 04/18/22 2125    atorvastatin (LIPITOR) tablet 40 mg 40 mg Oral Nightly Jagdish Amanda MD   40 mg at 04/18/22 2125    [Held by provider] carvedilol (COREG) tablet 3.125 mg  3.125 mg Oral BID  Jagdish Amanda MD        levothyroxine (SYNTHROID) tablet 50 mcg  50 mcg Oral Daily Jagdish Amanda MD   50 mcg at 04/19/22 9260    pregabalin (LYRICA) capsule 50 mg  50 mg Oral BID Jagdish Amanda MD   50 mg at 04/18/22 2125    sodium chloride flush 0.9 % injection 5-40 mL  5-40 mL IntraVENous 2 times per day Jagdish Amanda MD   10 mL at 04/18/22 2127    sodium chloride flush 0.9 % injection 5-40 mL  5-40 mL IntraVENous PRN Andreea Mckoy MD        0.9 % sodium chloride infusion   IntraVENous PRN Jagdish mAanda MD        enoxaparin (LOVENOX) injection 30 mg  30 mg SubCUTAneous Daily Andreea Mckoy MD        ondansetron (ZOFRAN-ODT) disintegrating tablet 4 mg  4 mg Oral Q8H PRN Jagdish Amanda MD        Or    ondansetron (ZOFRAN) injection 4 mg  4 mg IntraVENous Q6H PRN Andreea Mckoy MD        polyethylene glycol (GLYCOLAX) packet 17 g  17 g Oral Daily PRN Jagdish Amanda MD        acetaminophen (TYLENOL) tablet 650 mg  650 mg Oral Q6H PRN Jagdish Amanda MD        Or    acetaminophen (TYLENOL) suppository 650 mg  650 mg Rectal Q6H PRN Jagdish Amanda MD        potassium chloride (KLOR-CON M) extended release tablet 40 mEq  40 mEq Oral PRN Jagdish Amanda MD        Or    potassium bicarb-citric acid (EFFER-K) effervescent tablet 40 mEq  40 mEq Oral PRN Jagdish Amanda MD        Or    potassium chloride 10 mEq/100 mL IVPB (Peripheral Line)  10 mEq IntraVENous PRN Jagdish Amanda MD        magnesium sulfate 2000 mg in 50 mL IVPB premix  2,000 mg IntraVENous PRN Jagdish Amanda MD        glucose chewable tablet 4 each  4 tablet Oral PRN Jagdish Amanda MD        glucagon (rDNA) injection 1 mg  1 mg IntraMUSCular PRN Andreea Mckoy MD        dextrose 5 % solution  100 mL/hr IntraVENous PRN Jagdish Amanda MD  dextrose bolus (hypoglycemia) 10% 125 mL  125 mL IntraVENous PRN Owen Canales MD        Or    dextrose bolus (hypoglycemia) 10% 250 mL  250 mL IntraVENous PRN Owen Canales MD         No Known Allergies    Nursing Notes Reviewed    Physical Exam:  Triage VS:    ED Triage Vitals   Enc Vitals Group      BP       Pulse       Resp       Temp       Temp src       SpO2       Weight       Height       Head Circumference       Peak Flow       Pain Score       Pain Loc       Pain Edu? Excl. in [address]? My pulse ox interpretation is -95% at triage    GENERAL: Patient is awake, alert, and oriented to self and this physician. Patient is resting comfortably in a still position on the exam table. Patient keeps his eyes closed and only responds in yes or no answers. HEENT: Normocephalic and atraumatic. Pupils equal, round, and reactive to light. No redness or matting. Bilateral external ears are unremarkable. Nasal mucosa is pink without purulence. Oral mucosa is mildly dry. NECK: Supple with normal range of motion. No Kernig's or Brudzinski sign. No JVD. RESPIRATORY: Diminished effort with symmetric aeration. No respiratory distress. Faint coarse sounds at the bases with no wheeze, stridor, rales, rhonchi. Chest wall stable and nontender. CARDIOVASCULAR: Regular rate and rhythm. No murmurs, rubs, or gallops. No central or peripheral cyanosis. GASTROINTESTINAL: Distended. Facial grimace and soft moaning noted during examination. Diffuse tenderness with no McBurney's or Lima's point tenderness. No other focal tenderness. No involuntary guarding, rebound, or rigidity. No mass or pulsatile mass. Bowel sounds diminished. GENITOURINARY: Urinary catheter in place. Cloudy, yellow urine noted in the tubing and bag. No visible blood or clots. NEUROLOGICAL: Awake, alert and oriented to self and disposition. GCS 14, keeping his eyes closed.   Cranial nerves III through XII are grossly intact as tested without facial droop or dermatomal paresthesias. Of note, forehead wrinkles are symmetric and intact. Conjugate gaze without entrapment. No asymmetry of the corners of the mouth or nasolabial folds. No gross motor or cerebellar deficits. Effort is limited, but motor exam shows symmetric strength in the bilateral biceps, triceps, handgrip, quadriceps, psoas, dorsiflexors, and plantar flexors. No dermatomal paresthesias across the extremities. Gait testing deferred. BACK/MUSCULOSKELETAL: No asymmetric edema or calf tenderness. No Homans sign or cords. No tenderness or limitation range of motion to the bilateral shoulders, elbows, wrists, hips, knees, or ankles. SKIN: Normal tone for ethnicity. Normal turgor and brisk capillary refill peripherally. Emergency department course. Stroke alert was requested by EMS prior to arrival.  CT scan 2 was notified. Initial evaluation was performed in the hallway, and there is some slowed responsiveness, but patient is alert. Per nursing, patient is taken to Trauma-1 \"to start an IV. \"  Orders are placed for CT of head and CT angiogram of head and neck. CBC, metabolic panel, coagulation panel, among others are ordered. Accu-Chek is 53, so D50 one half amp is ordered. Patient is brought to bed Trauma-1 and assessed and reassessed by me. After initial evaluation, additional orders are placed for CT scan of the abdomen pelvis. D50 is out of stock, so D10 infusion is ordered instead. No family is present during the initial evaluation. Patient is agreeable to continuing plan. As of approximately 1300, [facility] reports a nonacute CT of the head. Upon most recent reevaluation, patient has shown noticeable improvement. He is much more alert, awake, and talkative. He is unsure of what caused his change in symptoms, but he seems very suspicious of the reported hypotension.   He is set on at least 2 occasions now that he is concerned that they may not be \"accurate. \"  We have discussed further monitoring in the hospital to ensure that there is no signs of recurrent TIA or developing CVA or infectious symptoms. Patient is agreeable. Urinalysis does show pyuria. This may be inflammatory due to a retained ureteral stent. Review of most recent urine cultures does show a candidal infection. As a precaution, first dose of fluconazole and ceftriaxone ordered. Patient has not required pressor support. Hospitalist Dr. Anatoliy Matias is paged at [phone] to discuss admission. Initial verbal orders are discussed at 3990. Decision time to admit: 1433. Patient seen during [facility], I did don appropriate PPE during my encounters with the patient, including n95 (when appropriate) mask and eye protection as appropriate.     I have reviewed and interpreted all of the currently available lab results from this visit (if applicable):  Results for orders placed or performed during the hospital encounter of 04/18/22   COVID-19, Rapid    Specimen: Nasopharyngeal   Result Value Ref Range    Source UNKNOWN     SARS-CoV-2, NAAT NOT DETECTED NOT DETECTED   CBC with Auto Differential   Result Value Ref Range    WBC 9.9 4.0 - 10.5 K/CU MM    RBC 2.97 (L) 4.6 - 6.2 M/CU MM    Hemoglobin 8.8 (L) 13.5 - 18.0 GM/DL    Hematocrit 30.3 (L) 42 - 52 %    .0 (H) 78 - 100 FL    MCH 29.6 27 - 31 PG    MCHC 29.0 (L) 32.0 - 36.0 %    RDW 17.0 (H) 11.7 - 14.9 %    Platelets 433 996 - 624 K/CU MM    MPV 12.3 (H) 7.5 - 11.1 FL    Differential Type AUTOMATED DIFFERENTIAL     Segs Relative 65.6 36 - 66 %    Lymphocytes % 21.2 (L) 24 - 44 %    Monocytes % 7.4 (H) 0 - 4 %    Eosinophils % 5.0 (H) 0 - 3 %    Basophils % 0.4 0 - 1 %    Segs Absolute 6.5 K/CU MM    Lymphocytes Absolute 2.1 K/CU MM    Monocytes Absolute 0.7 K/CU MM    Eosinophils Absolute 0.5 K/CU MM    Basophils Absolute 0.0 K/CU MM    Nucleated RBC % 0.0 %    Total Nucleated RBC 0.0 K/CU MM Total Immature Neutrophil 0.04 K/CU MM    Immature Neutrophil % 0.4 0 - 0.43 %   Comprehensive Metabolic Panel w/ Reflex to MG   Result Value Ref Range    Sodium 146 (H) 135 - 145 MMOL/L    Potassium 5.3 (H) 3.5 - 5.1 MMOL/L    Chloride 110 99 - 110 mMol/L    CO2 26 21 - 32 MMOL/L    BUN 53 (H) 6 - 23 MG/DL    CREATININE 2.4 (H) 0.9 - 1.3 MG/DL    Glucose 80 70 - 99 MG/DL    Calcium 9.5 8.3 - 10.6 MG/DL    Albumin 3.4 3.4 - 5.0 GM/DL    Total Protein 7.2 6.4 - 8.2 GM/DL    Total Bilirubin 0.2 0.0 - 1.0 MG/DL    ALT 30 10 - 40 U/L    AST 36 15 - 37 IU/L    Alkaline Phosphatase 98 40 - 129 IU/L    GFR Non- 26 (L) >60 mL/min/1.73m2    GFR  31 (L) >60 mL/min/1.73m2    Anion Gap 10 4 - 16   Troponin   Result Value Ref Range    Troponin T 0.040 (H) <0.01 NG/ML   Brain Natriuretic Peptide   Result Value Ref Range    Pro-.2 (H) <300 PG/ML   Protime-INR   Result Value Ref Range    Protime 12.5 11.7 - 14.5 SECONDS    INR 0.97 INDEX   APTT   Result Value Ref Range    aPTT 33.6 25.1 - 37.1 SECONDS   Lactic Acid   Result Value Ref Range    Lactate 1.1 0.4 - 2.0 mMOL/L   Urinalysis with Reflex to Culture    Specimen: Urine   Result Value Ref Range    Color, UA YELLOW (A) YELLOW    Clarity, UA CLOUDY (A) CLEAR    Glucose, Urine NEGATIVE NEGATIVE MG/DL    Bilirubin Urine NEGATIVE NEGATIVE MG/DL    Ketones, Urine NEGATIVE NEGATIVE MG/DL    Specific Gravity, UA 1.025 1.001 - 1.035    Blood, Urine MODERATE (A) NEGATIVE    pH, Urine 5.5 5.0 - 8.0    Protein, UA 30 (A) NEGATIVE MG/DL    Urobilinogen, Urine 0.2 0.2 - 1.0 MG/DL    Nitrite Urine, Quantitative NEGATIVE NEGATIVE    Leukocyte Esterase, Urine MODERATE (A) NEGATIVE    RBC, UA 36 (H) 0 - 3 /HPF    WBC, UA 1142 (H) 0 - 2 /HPF    Bacteria, UA NEGATIVE NEGATIVE /HPF    WBC Clumps, UA MANY /HPF    Squam Epithel, UA 2 /HPF    Trichomonas, UA NONE SEEN NONE SEEN /HPF   POCT Glucose   Result Value Ref Range    POC Glucose 53 (L) 70 - 99 MG/DL POCT Glucose   Result Value Ref Range    POC Glucose 69 (L) 70 - 99 MG/DL   POCT Glucose   Result Value Ref Range    POC Glucose 85 70 - 99 MG/DL   POCT Glucose   Result Value Ref Range    POC Glucose 128 (H) 70 - 99 MG/DL   POCT Glucose   Result Value Ref Range    POC Glucose 186 (H) 70 - 99 MG/DL   POCT Glucose   Result Value Ref Range    POC Glucose 289 (H) 70 - 99 MG/DL   POCT Glucose   Result Value Ref Range    POC Glucose 262 (H) 70 - 99 MG/DL   POCT Glucose   Result Value Ref Range    POC Glucose 150 (H) 70 - 99 MG/DL   POCT Glucose   Result Value Ref Range    POC Glucose 138 (H) 70 - 99 MG/DL   POCT Glucose   Result Value Ref Range    POC Glucose 191 (H) 70 - 99 MG/DL   POCT Glucose   Result Value Ref Range    POC Glucose 134 (H) 70 - 99 MG/DL   POCT Glucose   Result Value Ref Range    POC Glucose 142 (H) 70 - 99 MG/DL   POCT Glucose   Result Value Ref Range    POC Glucose 162 (H) 70 - 99 MG/DL   EKG 12 Lead   Result Value Ref Range    Ventricular Rate 73 BPM    Atrial Rate 73 BPM    P-R Interval 222 ms    QRS Duration 72 ms    Q-T Interval 382 ms    QTc Calculation (Bazett) 420 ms    P Axis 80 degrees    R Axis -13 degrees    T Axis 18 degrees    Diagnosis       Sinus rhythm with 1st degree AV block  Inferior infarct , age undetermined  Abnormal ECG  When compared with ECG of 18-APR-2022 09:55,  Sinus rhythm has replaced Atrial fibrillation          Radiographs (if obtained):  Radiologist's Report Reviewed:  CT Head WO Contrast    Result Date: 4/18/2022  EXAMINATION: CT OF THE HEAD WITHOUT CONTRAST  4/18/2022 11:53 am TECHNIQUE: CT of the head was performed without the administration of intravenous contrast. Dose modulation, iterative reconstruction, and/or weight based adjustment of the mA/kV was utilized to reduce the radiation dose to as low as reasonably achievable. COMPARISON: CT head without contrast 03/25/2020.  HISTORY: ORDERING SYSTEM PROVIDED HISTORY: Altered mental status, last documented known well time 4 PM yesterday TECHNOLOGIST PROVIDED HISTORY: Reason for exam:->Altered mental status, last documented known well time 4 PM yesterday Has a \"code stroke\" or \"stroke alert\" been called? ->No Reason for Exam: Altered mental status, last documented known well time 4 PM yesterday FINDINGS: BRAIN/VENTRICLES: No mass effect, edema or hemorrhage is seen. Stable chronic is seen right lateral basal ganglia/external region. Mild-to-moderate volume loss is seen in the brain with moderate chronic microvascular ischemic changes. No hydrocephalus or extra-axial fluid is seen. ORBITS: The visualized portion of the orbits demonstrate no acute abnormality. SINUSES: The visualized paranasal sinuses and mastoid air cells demonstrate no acute abnormality. SOFT TISSUES/SKULL:  No acute abnormality of the visualized skull or soft tissues. No acute intracranial abnormality. RECOMMENDATIONS: Unavailable     CT ABDOMEN PELVIS W IV CONTRAST Additional Contrast? None    Result Date: 4/18/2022  EXAMINATION: CT OF THE ABDOMEN AND PELVIS WITH CONTRAST 4/18/2022 12:08 pm TECHNIQUE: CT of the abdomen and pelvis was performed with the administration of intravenous contrast. Multiplanar reformatted images are provided for review. Dose modulation, iterative reconstruction, and/or weight based adjustment of the mA/kV was utilized to reduce the radiation dose to as low as reasonably achievable.  COMPARISON: 03/20/2022 HISTORY: ORDERING SYSTEM PROVIDED HISTORY: Abdominal distention, Abdominal pain, altered mentation, concern for sepsis TECHNOLOGIST PROVIDED HISTORY: Additional Contrast?->None Reason for exam:->Abdominal distention, Abdominal pain, altered mentation, concern for sepsis Decision Support Exception - unselect if not a suspected or confirmed emergency medical condition->Emergency Medical Condition (MA) Reason for Exam: Abdominal distention, Abdominal pain, altered mentation, concern for sepsis FINDINGS: Lower Chest:  Visualized portion of the lower chest demonstrates no acute abnormality. Organs: Left ureteral stent extends from the renal pelvis to the urinary bladder. Small foci of gas are noted in the left renal collecting system. The liver, spleen, pancreas, gallbladder, adrenal glands, and kidneys demonstrate no acute abnormality. GI/Bowel: No acutely dilated or inflamed loops of bowel. Small hiatal hernia. Normal appendix. Pelvis: Suprapubic catheter is present. Trace amount of pelvic free fluid. No pelvic mass, adenopathy, or fluid collection. Peritoneum/Retroperitoneum: No abdominal aortic aneurysm. No adenopathy. No ascites. No free intraperitoneal air. Bones/Soft Tissues: No acute osseous abnormality. 1. No acute abnormality in the abdomen or pelvis. 2. Left ureteral stent and suprapubic catheter. No hydronephrosis. 3. Trace amount of pelvic free fluid is nonspecific. XR CHEST PORTABLE    Result Date: 4/18/2022  EXAMINATION: ONE XRAY VIEW OF THE CHEST 4/18/2022 10:08 am COMPARISON: None. HISTORY: ORDERING SYSTEM PROVIDED HISTORY: Altered mental status TECHNOLOGIST PROVIDED HISTORY: Reason for exam:->Altered mental status Reason for Exam: ams FINDINGS: The lungs are clear, no effusion. No pneumothorax. Heart is normal size. Mediastinal and hilar contours are within normal limits. Bony thorax no acute abnormality. 1. No acute cardiopulmonary abnormality. CTA HEAD NECK W CONTRAST    Result Date: 4/18/2022  EXAMINATION: CTA OF THE HEAD AND NECK WITH CONTRAST 4/18/2022 12:08 pm: TECHNIQUE: CTA of the head and neck was performed with the administration of intravenous contrast. Multiplanar reformatted images are provided for review. MIP images are provided for review. Stenosis of the internal carotid arteries measured using NASCET criteria. Dose modulation, iterative reconstruction, and/or weight based adjustment of the mA/kV was utilized to reduce the radiation dose to as low as reasonably achievable. COMPARISON: Concurrent head CT HISTORY: ORDERING SYSTEM PROVIDED HISTORY: Altered mental status, last documented known well time 4 PM yesterday FINDINGS: CTA NECK: AORTIC ARCH/ARCH VESSELS: No dissection or arterial injury. No significant stenosis of the brachiocephalic or subclavian arteries. CAROTID ARTERIES: No dissection, arterial injury, or hemodynamically significant stenosis by NASCET criteria. There is retropharyngeal course of the bilateral carotid arteries. There is circumferential wall thickening throughout the bilateral common carotid arteries without significant stenosis. VERTEBRAL ARTERIES: Severe stenosis involving origin of the left cervical vertebral artery. Moderate stenosis involving origin of the right cervical vertebral artery. SOFT TISSUES: The lung apices are clear. No cervical or superior mediastinal lymphadenopathy. The larynx and pharynx are unremarkable. No acute abnormality of the salivary and thyroid glands. There is mild circumferential wall thickening of the thoracic esophagus which impresses the dorsal aspect of the trachea, unchanged. BONES: No acute osseous abnormality. Extensive postsurgical changes throughout the cervical spine with posterior fusion instrumentation and decompressive laminectomies at C3 through C7 CTA HEAD: ANTERIOR CIRCULATION: Scattered vascular calcifications throughout the carotid siphons with moderate to severe narrowing of the right carotid terminus. There is focal high-grade stenosis involving the proximal left M2 segment with improved caliber distally. There is focal high-grade stenosis involving the proximal right A2 segment. POSTERIOR CIRCULATION: There is high-grade stenosis involving the mid to distal left V4 segment. The right intracranial vertebral artery and basilar artery are patent without high-grade stenosis or occlusion.   Multi luminal irregularity throughout the proximal PCAs with high-grade stenosis involving the mid to distal right P2 segment. 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 OTHER: No dural venous sinus thrombosis on this non-dedicated study. BRAIN: No mass effect or midline shift. No extra-axial fluid collection. The gray-white differentiation is maintained. 1. No significant stenosis within the cervical ICAs per NASCET criteria. 2. Mild circumferential wall thickening throughout the bilateral common carotid arteries with retropharyngeal course. 3. Severe stenosis involving origin of the left cervical vertebral artery, moderate stenosis involving origin of the right cervical vertebral artery. 4. Extensive intracranial atherosclerosis resulting in multifocal areas of moderate to high-grade stenosis, as above. No large vessel occlusion. 5. 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 EKG:  Twelve-lead EKG obtained at 0955 on 18 April 2022 and interpreted by me in the absence of a cardiologist.  There is no criteria ST elevation or reciprocal change. There are no hyperacute T wave changes. There is no sign of acute ischemia or infarction. This tracing shows a irregularly irregular rhythm with significant baseline artifact. Rate and intervals are 79 beats per minute, WV interval indeterminant milliseconds, QRS duration 60 milliseconds, QTc interval 428 milliseconds, and R axis normal at -6 degrees. There is no acute change compared with the most recent EKG dated March 25, 2022.     Additional data:  Most recent urine culture and sensitivities dated April 2022 indicate:  Component 4/6/22 1645 Resulting Agency   Specimen [address]    Special Requests [address]    Culture Final Report  [facility]   Culture VANESSA ALBICANS 50,000 CFU/ml No further workup Abnormal          Medical decision making:  Patient presents to the emergency department with what appears to be transient altered mental status. There was hypoglycemia upon presentation. Whether this was the sole cause is difficult to determine, but patient has shown significant improvement while in the emergency department. Consider TIA. With consultation with stroke neurology from [facility], patient was not a candidate for systemic thrombolytics. CT angiogram does not show large vessel occlusion, so procedural care would not be indicated at this time either. Patient also presented with report of hypotension, though this has been not a factor while in the emergency department. There is chronic, generally stable renal dysfunction. There was some mild discomfort during initial abdominal exam, but there is no indication of an acute surgical emergency. There has been no respiratory distress, hypoxia, or cyanosis. Procedures: None. Consultations: None. Clinical Impression:  1. Altered mental status, unspecified altered mental status type    2. Hypoglycemia    3. Hypotension, unspecified hypotension type    4. Pyuria    5. Chronic renal impairment, unspecified CKD stage    6. Hypernatremia    7. Ureteral stent retained      Disposition referral (if applicable):  No follow-up provider specified. Disposition medications (if applicable):  Current Discharge Medication List        ED Provider Disposition Time  DISPOSITION Admitted 04/18/2022 03:13:15 PM      Comment: Please note this report has been produced using speech recognition software and may contain errors related to that system including errors in grammar, punctuation, and spelling, as well as words and phrases that may be inappropriate. Efforts were made to edit the dictations.         Antonio West MD  04/19/22 9768

## 2022-04-18 NOTE — ED NOTES
Rounds complete at this time, patient responding only by moaning when stimulated. BG 16 at this time. 1 amp of D50 given at this time. Rapid response called.       Alisa Agudelo RN  04/18/22 0225

## 2022-04-18 NOTE — ED NOTES
ER Dr. Sara Bourne at bedside at this time. Verbal orders for 2nd amp of D50, D50 given at this time. Patient more responsive at this time, but remains confused.       Ponce Leventhal, RN  04/18/22 0808

## 2022-04-18 NOTE — ED NOTES
Patient BG now 150 after 2 amps of D50, a turkey sandwich, 8 oz of OJ, and 2 packs of vaishali crackers. Dr. Maria Eugenia Solo notified.       Raciel Bender RN  04/18/22 3795

## 2022-04-18 NOTE — H&P
History and Physical      Name:  Keke Garland /Age/Sex: 1940  (78 y.o. male)   MRN & CSN:  [id number] & [id number] Admission Date/Time: 2022  9:44 AM   Location:  [facility]- PCP: Juancho Dunne MD       Hospital Day: 1    Assessment and Plan:   Keke Garland is a 78 y.o.  male  who presents with Metabolic encephalopathy    1. Metabolic encephalopathy with alteration in mental status likely related to infectious process. Benedetta Ou His CT scan of his head was negative for any acute findings. 2. Urinary tract infection, start IV Rocephin. Previous cultures showed Zenobia species. We will also add Diflucan. Follow current cultures. 3. Uncontrolled diabetes, with hypoglycemia, likely related to poor oral intake. Will encourage patient to eat. Will probably benefit from dextrose vigorously. Will treat with IV D5 normal saline at 25 cc an hour. 4. Acute kidney injury secondary to dehydration, hydration as above  5. Hypotension secondary to dehydration, hydration as above. Hold Coreg  6. Retained left ureteral stent. We will have urology see patient in outpatient. 7. History of prostate cancer    Diet ADULT DIET; Regular; 4 carb choices (60 gm/meal)   DVT Prophylaxis [x] Lovenox, []  Heparin, [] SCDs, [] Ambulation   GI Prophylaxis [x] PPI,  [] H2 Blocker,  [] Carafate,  [] Diet/Tube Feeds   Code Status Full Code   Disposition Patient requires continued admission due to illness requiring intravenous antibiotics. MDM [] Low, [] Moderate,[]  High  Patient's risk as above due to      History of Present Illness:     Chief Complaint: Metabolic encephalopathy  Keke Garland is a 78 y.o.  male  who with a history of diabetes, essential hypertension, recurrent urinary tract infection, presents with alteration in mental status at the [facility] home where he resides. Per nursing staff, patient was last known well at 4 PM yesterday. This morning, patient was difficult to arouse.   There is no reported falls.  His blood pressure was in the 80s and his blood sugar was in the 50s. Patient was fed a turkey sandwich in the emergency room with good return of blood sugars. Patient himself is unable to give a history due to dementia, but denies any new complaints does however appear to have some abdominal tenderness    Ten point ROS reviewed negative, unless as noted above    Objective:   No intake or output data in the 24 hours ending 04/18/22 1754   Vitals:   Vitals:    04/18/22 1631   BP: (!) 126/58   Pulse: 64   Resp:    Temp:    SpO2: 97%     Physical Exam:   GEN Awake male, sitting upright in bed in no apparent distress. Appears given age. EYES Pupils are equally round. No scleral erythema, discharge, or conjunctivitis. HENT Mucous membranes are moist. Oral pharynx without exudates, no evidence of thrush. NECK Supple, no apparent thyromegaly or masses. RESP Clear to auscultation, no wheezes, rales or rhonchi. Symmetric chest movement while on room air. CARDIO/VASC S1/S2 auscultated. Regular rate without appreciable murmurs, rubs, or gallops. No JVD or carotid bruits. Peripheral pulses equal bilaterally and palpable. No peripheral edema. GI Abdomen is soft without significant tenderness, masses, or guarding. Bowel sounds are normoactive. Rectal exam deferred.  No costovertebral angle tenderness. Normal appearing external genitalia. Hopper catheter is not present. HEME/LYMPH No palpable cervical lymphadenopathy and no hepatosplenomegaly. No petechiae or ecchymoses. MSK No gross joint deformities. SKIN Normal coloration, warm, dry. NEURO Cranial nerves appear grossly intact, normal speech, no lateralizing weakness. PSYCH Awake, alert, oriented x 4. Affect appropriate.     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 Allergies: No Known Allergies    FAM HX: family history includes Cancer in his brother, maternal uncle, and mother; Diabetes in his brother, father, maternal grandmother, and sister; Heart Disease in his father; High Blood Pressure in his father and sister; Stroke in his maternal grandfather. Soc HX:   Social History     Socioeconomic History    Marital status:      Spouse name: None    Number of children: 3    Years of education: None    Highest education level: None   Occupational History    None   Tobacco Use    Smoking status: Former Smoker     Packs/day: 0.50     Quit date: 1976     Years since quittin.8    Smokeless tobacco: Never Used   Vaping Use    Vaping Use: None   Substance and Sexual Activity    Alcohol use: No     Comment: Quit in     Drug use: No    Sexual activity: None   Other Topics Concern    None   Social History Narrative    None     Social Determinants of Health     Financial Resource Strain:     Difficulty of Paying Living Expenses: Not on file   Food Insecurity:     Worried About Running Out of Food in the Last Year: Not on file    Dixon of Food in the Last Year: Not on file   Transportation Needs:     Lack of Transportation (Medical): Not on file    Lack of Transportation (Non-Medical):  Not on file   Physical Activity:     Days of Exercise per Week: Not on file    Minutes of Exercise per Session: Not on file   Stress:     Feeling of Stress : Not on file   Social Connections:     Frequency of Communication with Friends and Family: Not on file    Frequency of Social Gatherings with Friends and Family: Not on file    Attends Christian Services: Not on file    Active Member of Clubs or Organizations: Not on file    Attends Club or Organization Meetings: Not on file    Marital Status: Not on file   Intimate Partner Violence:     Fear of Current or Ex-Partner: Not on file    Emotionally Abused: Not on file    Physically Abused: Not on file    Sexually Abused: Not on file   Housing Stability:     Unable to Pay for Housing in the Last Year: Not on file    Number of Places Lived in the Last Year: Not on file    Unstable Housing in the Last Year: Not on file       Medications:   Medications:    [START ON 4/19/2022] allopurinol  100 mg Oral Daily    [START ON 4/19/2022] aspirin  81 mg Oral Daily    sodium bicarbonate  650 mg Oral TID    atorvastatin  40 mg Oral Nightly    carvedilol  3.125 mg Oral BID WC    [START ON 4/19/2022] levothyroxine  50 mcg Oral Daily    pregabalin  50 mg Oral BID    sodium chloride flush  5-40 mL IntraVENous 2 times per day    [START ON 4/19/2022] enoxaparin  30 mg SubCUTAneous Daily      Infusions:    dextrose Stopped (04/18/22 1200)    sodium chloride       PRN Meds: docusate sodium, 100 mg, BID PRN  traMADol, 25 mg, Q6H PRN  sodium chloride flush, 5-40 mL, PRN  sodium chloride, , PRN  ondansetron, 4 mg, Q8H PRN   Or  ondansetron, 4 mg, Q6H PRN  polyethylene glycol, 17 g, Daily PRN  acetaminophen, 650 mg, Q6H PRN   Or  acetaminophen, 650 mg, Q6H PRN  potassium chloride, 40 mEq, PRN   Or  potassium alternative oral replacement, 40 mEq, PRN   Or  potassium chloride, 10 mEq, PRN  magnesium sulfate, 2,000 mg, PRN          Electronically signed by Charmayne Girt, MD on 4/18/2022 at 5:54 PM

## 2022-04-18 NOTE — ED NOTES
Son @ BS. Updated on cares we have given & plan. Son states understanding. Pt resting in bed resp even& non-labored. Hopper cath clamped to obtain urine specimen. Covid test completed & sent. Will cont to monitor.       Thu Srivastava RN  04/18/22 3572

## 2022-04-18 NOTE — SIGNIFICANT EVENT
Rapid response called for symptomatic hypoglycemia with sugar of 15 and patient not-responding. Given two amps of dextrose and sugar improved to 180; was responding and talking by time I arrived. Will continue D10 drip as ordered; if can swallow without contraindication can also feed patient.

## 2022-04-19 LAB
EKG ATRIAL RATE: 73 BPM
EKG DIAGNOSIS: NORMAL
EKG P AXIS: 80 DEGREES
EKG P-R INTERVAL: 222 MS
EKG Q-T INTERVAL: 382 MS
EKG QRS DURATION: 72 MS
EKG QTC CALCULATION (BAZETT): 420 MS
EKG R AXIS: -13 DEGREES
EKG T AXIS: 18 DEGREES
EKG VENTRICULAR RATE: 73 BPM
GLUCOSE BLD-MCNC: 111 MG/DL (ref 70–99)
GLUCOSE BLD-MCNC: 111 MG/DL (ref 70–99)
GLUCOSE BLD-MCNC: 122 MG/DL (ref 70–99)
GLUCOSE BLD-MCNC: 123 MG/DL (ref 70–99)
GLUCOSE BLD-MCNC: 137 MG/DL (ref 70–99)
GLUCOSE BLD-MCNC: 138 MG/DL (ref 70–99)
GLUCOSE BLD-MCNC: 141 MG/DL (ref 70–99)
GLUCOSE BLD-MCNC: 144 MG/DL (ref 70–99)
GLUCOSE BLD-MCNC: 149 MG/DL (ref 70–99)
GLUCOSE BLD-MCNC: 155 MG/DL (ref 70–99)
GLUCOSE BLD-MCNC: 162 MG/DL (ref 70–99)
GLUCOSE BLD-MCNC: 170 MG/DL (ref 70–99)
GLUCOSE BLD-MCNC: 174 MG/DL (ref 70–99)
GLUCOSE BLD-MCNC: 176 MG/DL (ref 70–99)
GLUCOSE BLD-MCNC: 179 MG/DL (ref 70–99)
GLUCOSE BLD-MCNC: 185 MG/DL (ref 70–99)
GLUCOSE BLD-MCNC: 197 MG/DL (ref 70–99)
GLUCOSE BLD-MCNC: 20 MG/DL (ref 70–99)

## 2022-04-19 PROCEDURE — 2580000003 HC RX 258: Performed by: INTERNAL MEDICINE

## 2022-04-19 PROCEDURE — 6370000000 HC RX 637 (ALT 250 FOR IP): Performed by: INTERNAL MEDICINE

## 2022-04-19 PROCEDURE — 2060000000 HC ICU INTERMEDIATE R&B

## 2022-04-19 PROCEDURE — 93010 ELECTROCARDIOGRAM REPORT: CPT | Performed by: INTERNAL MEDICINE

## 2022-04-19 PROCEDURE — 94761 N-INVAS EAR/PLS OXIMETRY MLT: CPT

## 2022-04-19 PROCEDURE — 6370000000 HC RX 637 (ALT 250 FOR IP): Performed by: NURSE PRACTITIONER

## 2022-04-19 PROCEDURE — 6360000002 HC RX W HCPCS: Performed by: INTERNAL MEDICINE

## 2022-04-19 PROCEDURE — 2500000003 HC RX 250 WO HCPCS

## 2022-04-19 PROCEDURE — 82962 GLUCOSE BLOOD TEST: CPT

## 2022-04-19 RX ORDER — DEXTROSE MONOHYDRATE 25 G/50ML
INJECTION, SOLUTION INTRAVENOUS
Status: COMPLETED
Start: 2022-04-19 | End: 2022-04-19

## 2022-04-19 RX ORDER — DEXTROSE AND SODIUM CHLORIDE 5; .9 G/100ML; G/100ML
INJECTION, SOLUTION INTRAVENOUS CONTINUOUS
Status: DISCONTINUED | OUTPATIENT
Start: 2022-04-19 | End: 2022-04-20

## 2022-04-19 RX ORDER — CEFUROXIME AXETIL 250 MG/1
500 TABLET ORAL ONCE
Status: COMPLETED | OUTPATIENT
Start: 2022-04-19 | End: 2022-04-19

## 2022-04-19 RX ORDER — DEXTROSE MONOHYDRATE 100 MG/ML
INJECTION, SOLUTION INTRAVENOUS CONTINUOUS
Status: DISCONTINUED | OUTPATIENT
Start: 2022-04-19 | End: 2022-04-19

## 2022-04-19 RX ORDER — AMLODIPINE BESYLATE 10 MG/1
10 TABLET ORAL DAILY
Status: DISCONTINUED | OUTPATIENT
Start: 2022-04-19 | End: 2022-04-23 | Stop reason: HOSPADM

## 2022-04-19 RX ADMIN — PREGABALIN 50 MG: 50 CAPSULE ORAL at 20:18

## 2022-04-19 RX ADMIN — ENOXAPARIN SODIUM 30 MG: 100 INJECTION SUBCUTANEOUS at 09:07

## 2022-04-19 RX ADMIN — AMLODIPINE BESYLATE 10 MG: 10 TABLET ORAL at 20:18

## 2022-04-19 RX ADMIN — SODIUM BICARBONATE 650 MG: 650 TABLET ORAL at 14:32

## 2022-04-19 RX ADMIN — LEVOTHYROXINE SODIUM 50 MCG: 25 TABLET ORAL at 06:35

## 2022-04-19 RX ADMIN — ALLOPURINOL 100 MG: 100 TABLET ORAL at 09:07

## 2022-04-19 RX ADMIN — SODIUM BICARBONATE 650 MG: 650 TABLET ORAL at 09:07

## 2022-04-19 RX ADMIN — DEXTROSE MONOHYDRATE 12.5 G: 25 INJECTION, SOLUTION INTRAVENOUS at 06:28

## 2022-04-19 RX ADMIN — DEXTROSE MONOHYDRATE: 100 INJECTION, SOLUTION INTRAVENOUS at 06:26

## 2022-04-19 RX ADMIN — SODIUM BICARBONATE 650 MG: 650 TABLET ORAL at 20:17

## 2022-04-19 RX ADMIN — PREGABALIN 50 MG: 50 CAPSULE ORAL at 09:07

## 2022-04-19 RX ADMIN — CEFUROXIME AXETIL 500 MG: 250 TABLET ORAL at 22:20

## 2022-04-19 RX ADMIN — ATORVASTATIN CALCIUM 40 MG: 40 TABLET, FILM COATED ORAL at 20:18

## 2022-04-19 RX ADMIN — SODIUM CHLORIDE, PRESERVATIVE FREE 10 ML: 5 INJECTION INTRAVENOUS at 10:44

## 2022-04-19 ASSESSMENT — PAIN SCALES - GENERAL
PAINLEVEL_OUTOF10: 0

## 2022-04-19 NOTE — PLAN OF CARE
Problem: Respiratory:  Goal: Ability to maintain a clear airway will improve  Description: Ability to maintain a clear airway will improve  Outcome: Met This Shift  Goal: Will remain free from infection  Description: Will remain free from infection  Outcome: Met This Shift  Goal: Absence of aspiration  Description: Absence of aspiration  Outcome: Met This Shift     Problem: Safety:  Goal: Ability to chew and swallow food without choking will improve  Description: Ability to chew and swallow food without choking will improve  Outcome: Met This Shift  Goal: Ability to demonstrate good, daily oral hygiene techniques will improve  Description: Ability to demonstrate good, daily oral hygiene techniques will improve  Outcome: Met This Shift  Goal: Maintenance of upright position during and after feeding  Description: Maintenance of upright position during and after feeding  Outcome: Met This Shift     Problem: Falls - Risk of:  Goal: Will remain free from falls  Description: Will remain free from falls  Outcome: Met This Shift  Goal: Absence of physical injury  Description: Absence of physical injury  Outcome: Met This Shift     Problem: Skin Integrity:  Goal: Will show no infection signs and symptoms  Description: Will show no infection signs and symptoms  Outcome: Ongoing  Goal: Absence of new skin breakdown  Description: Absence of new skin breakdown  Outcome: Ongoing     Problem: Nutritional:  Goal: Consumption of food in small portions  Description: Consumption of food in small portions  Outcome: Ongoing  Goal: Consumption of liquid of appropriate consistency  Description: Consumption of liquid of appropriate consistency  Outcome: Ongoing

## 2022-04-19 NOTE — PROGRESS NOTES
ATTENDING PHYSICIAN'S PROGRESS NOTES    Patient:  Calos Sexton      Unit/Bed:2016/2016-A    YOB: 1940    MRN: [id number]     Acct: [de-identified]     Admit date: 4/18/2022    Patient Seen, Chart, Consults notes, Labs, Radiology studies reviewed. SUBJECTIVE:   Day 1 of stay with acute metabolic encephalopathy. Seen and examined at bedside. No new complaint. Alert and oriented x 3 but takes time to respond and showed little confusion in between conversation. All other ROS negative except noted in HPI    Past, Family, Social History unchanged from admission. Diet:  ADULT DIET; Regular; 4 carb choices (60 gm/meal)    Medications:  Scheduled Meds:   allopurinol  100 mg Oral Daily    sodium bicarbonate  650 mg Oral TID    atorvastatin  40 mg Oral Nightly    [Held by provider] carvedilol  3.125 mg Oral BID WC    levothyroxine  50 mcg Oral Daily    pregabalin  50 mg Oral BID    sodium chloride flush  5-40 mL IntraVENous 2 times per day    enoxaparin  30 mg SubCUTAneous Daily     Continuous Infusions:   dextrose Stopped (04/19/22 1520)    sodium chloride      dextrose       PRN Meds:docusate sodium, traMADol, sodium chloride flush, sodium chloride, ondansetron **OR** ondansetron, polyethylene glycol, acetaminophen **OR** acetaminophen, potassium chloride **OR** potassium alternative oral replacement **OR** potassium chloride, magnesium sulfate, glucose, glucagon (rDNA), dextrose, dextrose bolus (hypoglycemia) **OR** dextrose bolus (hypoglycemia)    OBJECTIVE:  CBC:   Recent Labs     04/18/22  0811 04/18/22  0951   WBC 8.0 9.9   HGB 7.7* 8.8*    198     BMP:    Recent Labs     04/18/22  0811 04/18/22  0951    146*   K 4.4 5.3*    110   CO2 24 26   BUN 54* 53*   CREATININE 2.3* 2.4*   GLUCOSE 23* 80     Calcium:  Recent Labs     04/18/22  0951   CALCIUM 9.5     Ionized Calcium:No results for input(s): IONCA in the last 72 hours.   Magnesium:No results for input(s): MG in the last 72 hours. Phosphorus:No results for input(s): PHOS in the last 72 hours. BNP:No results for input(s): BNP in the last 72 hours. Glucose:  Recent Labs     04/19/22  1509 04/19/22  1559 04/19/22  1800   POCGLU 138* 123* 170*     HgbA1C: No results for input(s): LABA1C in the last 72 hours. INR:   Recent Labs     04/18/22  0951   INR 0.97     Hepatic:   Recent Labs     04/18/22  0951   ALKPHOS 98   ALT 30   AST 36   PROT 7.2   BILITOT 0.2   LABALBU 3.4     Amylase and Lipase:No results for input(s): LACTA, AMYLASE in the last 72 hours. Lactic Acid: No results for input(s): LACTA in the last 72 hours. Troponin:   Recent Labs     04/18/22  0951   TROPONINT 0.040*     BNP: No results for input(s): BNP in the last 72 hours. Lipids: No results for input(s): CHOL, TRIG, HDL, LDL, LDLCALC in the last 72 hours. ABGs: No results found for: PH, PCO2, PO2, HCO3, O2SAT    Radiology reports as per the Radiologist  Radiology:   CT Head WO Contrast  Result Date: 4/18/2022  EXAMINATION: CT OF THE HEAD WITHOUT CONTRAST  4/18/2022 11:53 am TECHNIQUE: CT of the head was performed without the administration of intravenous contrast. Dose modulation, iterative reconstruction, and/or weight based adjustment of the mA/kV was utilized to reduce the radiation dose to as low as reasonably achievable. COMPARISON: CT head without contrast 03/25/2020. HISTORY: ORDERING SYSTEM PROVIDED HISTORY: Altered mental status, last documented known well time 4 PM yesterday TECHNOLOGIST PROVIDED HISTORY: Reason for exam:->Altered mental status, last documented known well time 4 PM yesterday Has a \"code stroke\" or \"stroke alert\" been called? ->No Reason for Exam: Altered mental status, last documented known well time 4 PM yesterday FINDINGS: BRAIN/VENTRICLES: No mass effect, edema or hemorrhage is seen. Stable chronic is seen right lateral basal ganglia/external region.  Mild-to-moderate volume loss is seen in the brain with moderate chronic microvascular ischemic changes. No hydrocephalus or extra-axial fluid is seen. ORBITS: The visualized portion of the orbits demonstrate no acute abnormality. SINUSES: The visualized paranasal sinuses and mastoid air cells demonstrate no acute abnormality. SOFT TISSUES/SKULL:  No acute abnormality of the visualized skull or soft tissues. No acute intracranial abnormality. RECOMMENDATIONS: Unavailable     CT ABDOMEN PELVIS W IV CONTRAST Additional Contrast? None  Result Date: 4/18/2022  EXAMINATION: CT OF THE ABDOMEN AND PELVIS WITH CONTRAST 4/18/2022 12:08 pm TECHNIQUE: CT of the abdomen and pelvis was performed with the administration of intravenous contrast. Multiplanar reformatted images are provided for review. Dose modulation, iterative reconstruction, and/or weight based adjustment of the mA/kV was utilized to reduce the radiation dose to as low as reasonably achievable. COMPARISON: 03/20/2022 HISTORY: ORDERING SYSTEM PROVIDED HISTORY: Abdominal distention, Abdominal pain, altered mentation, concern for sepsis TECHNOLOGIST PROVIDED HISTORY: Additional Contrast?->None Reason for exam:->Abdominal distention, Abdominal pain, altered mentation, concern for sepsis Decision Support Exception - unselect if not a suspected or confirmed emergency medical condition->Emergency Medical Condition (MA) Reason for Exam: Abdominal distention, Abdominal pain, altered mentation, concern for sepsis FINDINGS: Lower Chest:  Visualized portion of the lower chest demonstrates no acute abnormality. Organs: Left ureteral stent extends from the renal pelvis to the urinary bladder. Small foci of gas are noted in the left renal collecting system. The liver, spleen, pancreas, gallbladder, adrenal glands, and kidneys demonstrate no acute abnormality. GI/Bowel: No acutely dilated or inflamed loops of bowel. Small hiatal hernia. Normal appendix. Pelvis: Suprapubic catheter is present. Trace amount of pelvic free fluid.  No pelvic mass, adenopathy, or fluid collection. Peritoneum/Retroperitoneum: No abdominal aortic aneurysm. No adenopathy. No ascites. No free intraperitoneal air. Bones/Soft Tissues: No acute osseous abnormality. 1. No acute abnormality in the abdomen or pelvis. 2. Left ureteral stent and suprapubic catheter. No hydronephrosis. 3. Trace amount of pelvic free fluid is nonspecific. XR CHEST PORTABLE  Result Date: 4/18/2022  EXAMINATION: ONE XRAY VIEW OF THE CHEST 4/18/2022 10:08 am COMPARISON: None. HISTORY: ORDERING SYSTEM PROVIDED HISTORY: Altered mental status TECHNOLOGIST PROVIDED HISTORY: Reason for exam:->Altered mental status Reason for Exam: ams FINDINGS: The lungs are clear, no effusion. No pneumothorax. Heart is normal size. Mediastinal and hilar contours are within normal limits. Bony thorax no acute abnormality. 1. No acute cardiopulmonary abnormality. CTA HEAD NECK W CONTRAST  Result Date: 4/18/2022  EXAMINATION: CTA OF THE HEAD AND NECK WITH CONTRAST 4/18/2022 12:08 pm: TECHNIQUE: CTA of the head and neck was performed with the administration of intravenous contrast. Multiplanar reformatted images are provided for review. MIP images are provided for review. Stenosis of the internal carotid arteries measured using NASCET criteria. Dose modulation, iterative reconstruction, and/or weight based adjustment of the mA/kV was utilized to reduce the radiation dose to as low as reasonably achievable. COMPARISON: Concurrent head CT HISTORY: ORDERING SYSTEM PROVIDED HISTORY: Altered mental status, last documented known well time 4 PM yesterday FINDINGS: CTA NECK: AORTIC ARCH/ARCH VESSELS: No dissection or arterial injury. No significant stenosis of the brachiocephalic or subclavian arteries. CAROTID ARTERIES: No dissection, arterial injury, or hemodynamically significant stenosis by NASCET criteria. There is retropharyngeal course of the bilateral carotid arteries.   There is circumferential wall thickening throughout the bilateral common carotid arteries without significant stenosis. VERTEBRAL ARTERIES: Severe stenosis involving origin of the left cervical vertebral artery. Moderate stenosis involving origin of the right cervical vertebral artery. SOFT TISSUES: The lung apices are clear. No cervical or superior mediastinal lymphadenopathy. The larynx and pharynx are unremarkable. No acute abnormality of the salivary and thyroid glands. There is mild circumferential wall thickening of the thoracic esophagus which impresses the dorsal aspect of the trachea, unchanged. BONES: No acute osseous abnormality. Extensive postsurgical changes throughout the cervical spine with posterior fusion instrumentation and decompressive laminectomies at C3 through C7 CTA HEAD: ANTERIOR CIRCULATION: Scattered vascular calcifications throughout the carotid siphons with moderate to severe narrowing of the right carotid terminus. There is focal high-grade stenosis involving the proximal left M2 segment with improved caliber distally. There is focal high-grade stenosis involving the proximal right A2 segment. POSTERIOR CIRCULATION: There is high-grade stenosis involving the mid to distal left V4 segment. The right intracranial vertebral artery and basilar artery are patent without high-grade stenosis or occlusion. Multi luminal irregularity throughout the proximal PCAs with high-grade stenosis involving the mid to distal right P2 segment. 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 OTHER: No dural venous sinus thrombosis on this non-dedicated study. BRAIN: No mass effect or midline shift. No extra-axial fluid collection. The gray-white differentiation is maintained. 1. No significant stenosis within the cervical ICAs per NASCET criteria. 2. Mild circumferential wall thickening throughout the bilateral common carotid arteries with retropharyngeal course.  3. Severe stenosis involving origin of the left cervical vertebral artery, moderate stenosis involving origin of the right cervical vertebral artery. 4. Extensive intracranial atherosclerosis resulting in multifocal areas of moderate to high-grade stenosis, as above. No large vessel occlusion. 5. 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 Physical Exam:  Vitals: BP (!) 170/72   Pulse 91   Temp 97.2 °F (36.2 °C) (Axillary)   Resp 12   Ht 5' 8\" (1.727 m)   Wt 194 lb (88 kg)   SpO2 98%   BMI 29.50 kg/m²   24 hour intake/output:    Intake/Output Summary (Last 24 hours) at 4/19/2022 1840  Last data filed at 4/19/2022 1827  Gross per 24 hour   Intake --   Output 700 ml   Net -700 ml     Last 3 weights: Wt Readings from Last 3 Encounters:   04/18/22 194 lb (88 kg)   04/14/22 194 lb 10.7 oz (88.3 kg)   03/28/22 201 lb (91.2 kg)     General appearance - alert, well appearing, and in no distress  HEENT: Normocephalic, Atraumatic, Conjuctiva pink, PERRL, Oral mucosa normal, Lips, teeth and gums normal, Trachea midline, Thyroid normal and No noted lymphadenopathy  Chest - clear to auscultation, no wheezes, rales or rhonchi, symmetric air entry  Cardiovascular - normal rate, regular rhythm, normal S1, S2, no murmurs, rubs, clicks or gallops  Abdomen - soft, nontender, nondistended, no masses or organomegaly  Suprapubic mccrary. Neurological - Alert and oriented, Normal speech, No focal findings or movement disorder noted and Motor and sensory grossly normal bilaterally  Integumentary - Skin color, texture, turgor normal. No Rashes or lesions  Musculoskeletal -Full ROM times 4 extremities, No clubbing or cyanosis and No peripheral edema      Diet ADULT DIET; Regular; 4 carb choices (60 gm/meal)   DVT Prophylaxis [x]? Lovenox, []? Heparin, []? SCDs, []? Ambulation   GI Prophylaxis [x]? PPI,  []? H2 Blocker,  []? Carafate,  []?  Diet/Tube Feeds   Code Status Full Code   Disposition Patient requires continued admission due to illness requiring intravenous antibiotics. MDM []? Low, [x]? Moderate,[]? High  Patient's risk as above due to                     ASSESSMENT / PLAN :    Principal Problem:    Metabolic encephalopathy  Resolved Problems:    * No resolved hospital problems. *    Davey Ceron is a 78 y.o.  male  who presents with Metabolic encephalopathy     #Metabolic encephalopathy with alteration in mental status likely related to infectious process.-UTI. His CT scan of his head was negative for any acute findings. Urine culture with seratia. Sensitivity pending. # Urinary tract infection: Continue  IV Rocephin. Follow current cultures. # Uncontrolled diabetes, with hypoglycemia, likely related to poor oral intake. Eating slightly now. Continue D 10. Monitor. #Acute kidney injury secondary to dehydration, hydration as above. Repeat lab in AM.  #Hypotension secondary to dehydration  Resolved. BP now high. Restart Norvasc. Hold lisinopril sec renal failure  # Retained left ureteral stent. Will need urology after infection treated.   # History of prostate cancer       Electronically signed by Gee Mary MD on 4/19/2022 at 6:40 PM    Rounding Hospitalist

## 2022-04-19 NOTE — PROGRESS NOTES
Per nursing staff, glucose less than 20 and dextrose already ordered, rechecking Accu-Chek in 10 minutes. Patient D5W to D10W infusion at 100 cc an hour. Obtain Accu-Cheks hourly. Discussed with nursing staff.

## 2022-04-19 NOTE — PROGRESS NOTES
Physician Progress Note      PATIENT:               Jade Choudhury  CSN #:                  [id number]  :                       1940  ADMIT DATE:       2022 9:44 AM  DISCH DATE:  RESPONDING  PROVIDER #:        Maddie Sinclair          QUERY TEXT:    Pt admitted with UTI. Per H&P patient noted to have a \"retained left ureteral   stent\" and has a suprapubic catheter in place. If possible, please document   in the progress notes and discharge summary if you are evaluating and/or   treating any of the following: The medical record reflects the following:  Risk Factors: suprapubic catheter, ureteral stent, dehydration  Clinical Indicators: PAMELLA. urine WBC 1,142, moderate leukoesterase. CT   abdomen/pelvis on :Left ureteral stent and suprapubic catheter. No   hydronephrosis. Treatment: IV Rocephin, IVF, IV Diflucan, consult urology as outpatient,   cultures    RAEGAN Toth, RN, [facility]  Clinical   [phone]  Options provided:  -- UTI due to ureteral stent  -- UTI  due to suprapubic catheter  -- Other - I will add my own diagnosis  -- Disagree - Not applicable / Not valid  -- Disagree - Clinically unable to determine / Unknown  -- Refer to Clinical Documentation Reviewer    PROVIDER RESPONSE TEXT:    Provider is clinically unable to determine a response to this query.     Query created by: Taylor Fleming on 2022 9:45 AM      Electronically signed by:  Maddie Sinclair 2022 10:32 AM

## 2022-04-20 LAB
AMMONIA: 42 UMOL/L (ref 16–60)
ANION GAP SERPL CALCULATED.3IONS-SCNC: 10 MMOL/L (ref 4–16)
ANION GAP SERPL CALCULATED.3IONS-SCNC: 11 MMOL/L (ref 4–16)
BASOPHILS ABSOLUTE: 0 K/CU MM
BASOPHILS RELATIVE PERCENT: 0.4 % (ref 0–1)
BUN BLDV-MCNC: 35 MG/DL (ref 6–23)
BUN BLDV-MCNC: 36 MG/DL (ref 6–23)
CALCIUM SERPL-MCNC: 8.4 MG/DL (ref 8.3–10.6)
CALCIUM SERPL-MCNC: 8.6 MG/DL (ref 8.3–10.6)
CHLORIDE BLD-SCNC: 101 MMOL/L (ref 99–110)
CHLORIDE BLD-SCNC: 101 MMOL/L (ref 99–110)
CO2: 20 MMOL/L (ref 21–32)
CO2: 22 MMOL/L (ref 21–32)
CREAT SERPL-MCNC: 1.8 MG/DL (ref 0.9–1.3)
CREAT SERPL-MCNC: 1.8 MG/DL (ref 0.9–1.3)
CULTURE: ABNORMAL
CULTURE: ABNORMAL
DIFFERENTIAL TYPE: ABNORMAL
EOSINOPHILS ABSOLUTE: 0.6 K/CU MM
EOSINOPHILS RELATIVE PERCENT: 8.3 % (ref 0–3)
GFR AFRICAN AMERICAN: 44 ML/MIN/1.73M2
GFR AFRICAN AMERICAN: 44 ML/MIN/1.73M2
GFR NON-AFRICAN AMERICAN: 36 ML/MIN/1.73M2
GFR NON-AFRICAN AMERICAN: 36 ML/MIN/1.73M2
GLUCOSE BLD-MCNC: 135 MG/DL (ref 70–99)
GLUCOSE BLD-MCNC: 146 MG/DL (ref 70–99)
GLUCOSE BLD-MCNC: 151 MG/DL (ref 70–99)
GLUCOSE BLD-MCNC: 171 MG/DL (ref 70–99)
GLUCOSE BLD-MCNC: 197 MG/DL (ref 70–99)
GLUCOSE BLD-MCNC: 204 MG/DL (ref 70–99)
GLUCOSE BLD-MCNC: 223 MG/DL (ref 70–99)
GLUCOSE BLD-MCNC: 237 MG/DL (ref 70–99)
HCT VFR BLD CALC: 28 % (ref 42–52)
HEMOGLOBIN: 8.3 GM/DL (ref 13.5–18)
IMMATURE NEUTROPHIL %: 0.3 % (ref 0–0.43)
LYMPHOCYTES ABSOLUTE: 1.7 K/CU MM
LYMPHOCYTES RELATIVE PERCENT: 22.8 % (ref 24–44)
Lab: ABNORMAL
MCH RBC QN AUTO: 29.2 PG (ref 27–31)
MCHC RBC AUTO-ENTMCNC: 29.6 % (ref 32–36)
MCV RBC AUTO: 98.6 FL (ref 78–100)
MONOCYTES ABSOLUTE: 0.8 K/CU MM
MONOCYTES RELATIVE PERCENT: 10.1 % (ref 0–4)
NUCLEATED RBC %: 0 %
PDW BLD-RTO: 16.3 % (ref 11.7–14.9)
PLATELET # BLD: 186 K/CU MM (ref 140–440)
PMV BLD AUTO: 12 FL (ref 7.5–11.1)
POTASSIUM SERPL-SCNC: 4.9 MMOL/L (ref 3.5–5.1)
POTASSIUM SERPL-SCNC: 5.3 MMOL/L (ref 3.5–5.1)
PROCALCITONIN: 6.27
RBC # BLD: 2.84 M/CU MM (ref 4.6–6.2)
SEGMENTED NEUTROPHILS ABSOLUTE COUNT: 4.4 K/CU MM
SEGMENTED NEUTROPHILS RELATIVE PERCENT: 58.1 % (ref 36–66)
SODIUM BLD-SCNC: 132 MMOL/L (ref 135–145)
SODIUM BLD-SCNC: 133 MMOL/L (ref 135–145)
SPECIMEN: ABNORMAL
TOTAL IMMATURE NEUTOROPHIL: 0.02 K/CU MM
TOTAL NUCLEATED RBC: 0 K/CU MM
TROPONIN T: 0.03 NG/ML
WBC # BLD: 7.5 K/CU MM (ref 4–10.5)

## 2022-04-20 PROCEDURE — 97166 OT EVAL MOD COMPLEX 45 MIN: CPT

## 2022-04-20 PROCEDURE — 6370000000 HC RX 637 (ALT 250 FOR IP): Performed by: INTERNAL MEDICINE

## 2022-04-20 PROCEDURE — 84145 PROCALCITONIN (PCT): CPT

## 2022-04-20 PROCEDURE — 97162 PT EVAL MOD COMPLEX 30 MIN: CPT

## 2022-04-20 PROCEDURE — 2580000003 HC RX 258: Performed by: INTERNAL MEDICINE

## 2022-04-20 PROCEDURE — 82140 ASSAY OF AMMONIA: CPT

## 2022-04-20 PROCEDURE — 97535 SELF CARE MNGMENT TRAINING: CPT

## 2022-04-20 PROCEDURE — 36415 COLL VENOUS BLD VENIPUNCTURE: CPT

## 2022-04-20 PROCEDURE — 2060000000 HC ICU INTERMEDIATE R&B

## 2022-04-20 PROCEDURE — 83605 ASSAY OF LACTIC ACID: CPT

## 2022-04-20 PROCEDURE — 6360000002 HC RX W HCPCS: Performed by: INTERNAL MEDICINE

## 2022-04-20 PROCEDURE — 85025 COMPLETE CBC W/AUTO DIFF WBC: CPT

## 2022-04-20 PROCEDURE — 84484 ASSAY OF TROPONIN QUANT: CPT

## 2022-04-20 PROCEDURE — 97530 THERAPEUTIC ACTIVITIES: CPT

## 2022-04-20 PROCEDURE — 76937 US GUIDE VASCULAR ACCESS: CPT

## 2022-04-20 PROCEDURE — 80048 BASIC METABOLIC PNL TOTAL CA: CPT

## 2022-04-20 PROCEDURE — 94761 N-INVAS EAR/PLS OXIMETRY MLT: CPT

## 2022-04-20 RX ORDER — INSULIN LISPRO 100 [IU]/ML
0-6 INJECTION, SOLUTION INTRAVENOUS; SUBCUTANEOUS
Status: DISCONTINUED | OUTPATIENT
Start: 2022-04-20 | End: 2022-04-21

## 2022-04-20 RX ORDER — INSULIN LISPRO 100 [IU]/ML
0-3 INJECTION, SOLUTION INTRAVENOUS; SUBCUTANEOUS NIGHTLY
Status: DISCONTINUED | OUTPATIENT
Start: 2022-04-20 | End: 2022-04-21

## 2022-04-20 RX ADMIN — ATORVASTATIN CALCIUM 40 MG: 40 TABLET, FILM COATED ORAL at 21:56

## 2022-04-20 RX ADMIN — PREGABALIN 50 MG: 50 CAPSULE ORAL at 08:57

## 2022-04-20 RX ADMIN — SODIUM CHLORIDE, PRESERVATIVE FREE 10 ML: 5 INJECTION INTRAVENOUS at 21:55

## 2022-04-20 RX ADMIN — SODIUM BICARBONATE 650 MG: 650 TABLET ORAL at 15:00

## 2022-04-20 RX ADMIN — MEROPENEM 1000 MG: 1 INJECTION, POWDER, FOR SOLUTION INTRAVENOUS at 16:46

## 2022-04-20 RX ADMIN — LEVOTHYROXINE SODIUM 50 MCG: 25 TABLET ORAL at 06:19

## 2022-04-20 RX ADMIN — AMLODIPINE BESYLATE 10 MG: 10 TABLET ORAL at 08:57

## 2022-04-20 RX ADMIN — SODIUM BICARBONATE 650 MG: 650 TABLET ORAL at 21:58

## 2022-04-20 RX ADMIN — ALLOPURINOL 100 MG: 100 TABLET ORAL at 08:57

## 2022-04-20 RX ADMIN — PREGABALIN 50 MG: 50 CAPSULE ORAL at 21:57

## 2022-04-20 RX ADMIN — ENOXAPARIN SODIUM 30 MG: 100 INJECTION SUBCUTANEOUS at 08:57

## 2022-04-20 RX ADMIN — DEXTROSE AND SODIUM CHLORIDE: 5; 900 INJECTION, SOLUTION INTRAVENOUS at 02:43

## 2022-04-20 RX ADMIN — SODIUM BICARBONATE 650 MG: 650 TABLET ORAL at 08:57

## 2022-04-20 ASSESSMENT — PAIN SCALES - GENERAL
PAINLEVEL_OUTOF10: 0

## 2022-04-20 NOTE — CONSULTS
[facility], 1940, 2016/2016-A, 4/20/2022    History  [place]:  The primary encounter diagnosis was Altered mental status, unspecified altered mental status type. Diagnoses of Hypoglycemia, Hypotension, unspecified hypotension type, Pyuria, Chronic renal impairment, unspecified CKD stage, Hypernatremia, and Ureteral stent retained were also pertinent to this visit. Patient  has a past medical history of Acute urinary tract infection, Ataxia, Diabetes mellitus ([address] Fusion of spine of cervical region, Gait disturbance, History of prostate cancer, History of tobacco use, Hyperlipidemia, and Osteoarthritis. Patient  has a past surgical history that includes Prostate surgery; other surgical history (3/28/13); Colon surgery; Bladder surgery; Prostatectomy (1996); and Bladder surgery (Left, 3/17/2022). Subjective:  Patient states:  \"Well I was embarrassed because I thought I knew I was in area [place] [employer] [place] per [employer]", pt goes on to acknowledge he was not oriented. Son present interpret meaning of \"area C\". Pain:  Pt with intermittent c/o \"pulling\" pain near catheter site. Communication with other providers:  Handoff to RN, co-eval with OT, Mily Castro for safety. Discussed with ISHMAEL Figueroa end of session.   Restrictions: Fall risk, suprapubic catheter, tele, Sp02, Tele, confusion, Contact, general.    Home Setup/Prior level of function   *PT reviewed and confirmed the information below with Son:  Social/Functional History  Lives With: Son  Type of Home: House  Home Layout: One level  Home Access: Ramped entrance  Bathroom Shower/Tub: Tub/Shower unit,Shower chair with back  Bathroom Toilet: Standard  Bathroom Equipment: Grab bars in shower,Shower chair  Bathroom Accessibility: Cherri Plascencia accessible (states once he is in bathroom he uses countertops and grab bars, but pt not able to clarify if this is because there is little room or just preference)  Home Equipment: Cane,4 wheeled walker,Rolling walker (pt thinks there may be a BSC from his wife in storage, but unsure)  ADL Assistance: Independent  Homemaking Assistance: Independent  Homemaking Responsibilities: Yes  Meal Prep Responsibility: Secondary  Laundry Responsibility: Primary  Cleaning Responsibility: Secondary  Bill Paying/Finance Responsibility: Primary  Shopping Responsibility: Secondary  Health Care Management: Primary (has pill box with alarms per pt. until recently [address] would set up, but once Tyrell Hodge stopped he managed on own)  Ambulation Assistance: Independent (mod I with 4ww for up to 150'. pt used electronic carts at stores when able)  Transfer Assistance: Independent  Active : No  Patient's  Info: 3 sons assist in driving. Mode of Transportation: Car,SUV  Occupation: Retired  Type of occupation: Retired from [address] [place] [address] Street: TV, very little reading, no pets, doctors, son manages groceries,  Meds are managed via pill dispenser that son manages. Pt occasionally writes checks and some auto pay  Additional Comments: pt sleeps in flat bed. Pt has fallen >4 times in past year. Pt attributes many to low BS. No injury  *Pt was with recent admission March 2022, was d/c to [facility], and then to [facility]     Examination of body systems (includes body structures/functions, activity/participation limitations):  · Observation:  Pt is awake in semi fowlers with son present upon arrival  · [place]:  [place] [place]  · [facility]  [place]  · Cardiopulmonary:  Pt is on room air, stable  · Cognition: Pt is not fully oriented, able to follow commands with min increased time, min impulsive see OT/SLP note for further evaluation. Musculoskeletal  · ROM R/L:  WFL BLE. *Limited R shoulder ROM  · Strength R/L:  Generally 4/5 B hip flexors 4-/5, decreased in function and endurance.     · Neuro:  Pt confirms min N&T hands and feet    · Gait pattern: Pt demonstrates step-to pattern limited evaluation in transfer to chair. Mobility:  · Supine to sit:  Min-Mod  · Transfers: Min  · Sitting balance:  SBA. · Standing balance:  CGA. · Gait: CGA-Min    [facility] 6 Clicks Inpatient Mobility:  AM-PAC Inpatient Mobility Raw Score : 15    Treatment:    Bed mobility: PT encourages sup>sit, provides v/c for sequencing. Pt demonstrates fair ability to advance LE, requires increased time and effort, Min A for LE/ hips to EOB and Min for trunk to upright. PT v/c for scooting to EOB, pt requires Mod A to complete d/t difficulty WS. Sitting balance: Seated EOB pt demonstrates fair- balance, UE support required for maintaining upright. Seated breaks required between trials of standing time, SBA and cues to prevent premature return to standing. Sit<>Stand: Pt performed STS from EOB to RW  with Min A to advance to upright, stabilize RW, and correct posterior lean, v/c for proper sequencing. Pt demonstrates increased retro lean, increased time to upright. In stance, pt demonstrates FAVIAN toes raising off ground. Pt requires Min A to correct retro lean, cues and Min A to advance forward. Pt does improve to CGA with time. This process was required in both standing trials. OT provided dependent dada care in standing ~2 min standing. Return to seated at EOB x2 reps pt demonstrates decreased eccentric control and decreased safety awareness, v/c for safe sequencing. Final return to seated in recliner pt with premature return to sit, Min-Mod A to prevent sitting, Max cues to continue retro stepping to chair fair carryover, Min A. Stand-step transfer to chair with RW, increased time, step-to pattern x5 ft to chair. Cues for safety and RW management, pt min unpredictable in stepping. Education: PT discussed impact of UTI on cognition with Son, discussed d/c planning, emphasized importance of AMB.    Min increased time end of session positioning the LE for comfort, blanket bolster required behind knees, under calf. End of session pt left in recliner with BLE elevated  with lines managed, call light, phone, exit alarm, tray, all needs, RN aware. Assessment:    Pt is an 79 y/o male admitted 4/18 with c/o  Metabolic encephalopathy. Patient with significant h/o diabetes, essential hypertension, recurrent urinary tract infection, see chart. Per pt report pt has been performing ADLs/IADLs with assist from SNF staff. Per notes from recent admission, pt was able to AMB x10 ft CGA with staff and RW. At this time pt appears to be functioning below baseline. Pt is now presenting with impairments in cognition, LE strength, functional endurance, safety awareness, balance, gait and mobility deficits. Pt would benefit from skilled PT services in order to address impairments and promote return to PLOF. PT to recommend d/c to SNF for continued rehab. Complexity: Moderate  Prognosis: Good, no significant barriers to participation at this time. Plan  3+/week, 1 week,   Discharge Recommendations: Subacute/Skilled Nursing Facility  Equipment: Defer    Goals:     Long Term Goals  Time Frame for Long term goals : 1 week  Long term goal 1: Pt will perform sup>sit with min cues and Min A. Long term goal 2: Pt will perform SPT from EOB>chair with RW and CGA. Long term goal 3: Pt will AMB x25 ft with RW, chair follow, cues, CGA. Long term goal 4: Pt will tolerate x5' standing balance time during functional tasks with UE support, CGA.     Treatment plan:  Bed mobility, transfers, balance, gait, TA, TX    Recommendations for NURSING mobility: SPT with [place] x2 and RW for safety    Time:   Time in: 15:50  Time out: 16:31  Timed treatment minutes: 25  Total time: 41    Electronically signed by:    Beto Wolf, PT  1/73/5132, 1:33 PM  PT Lic #: [id number]

## 2022-04-20 NOTE — DISCHARGE INSTR - COC
Continuity of Care Form    Patient Name: Keke Garland   :  1940  MRN:  [id number]    6 [facility] date:  2022  Discharge date:  **22*    Code Status Order: Full Code   Advance Directives:      Admitting Physician:  Gege Garcias MD  PCP: Juancho Dunne MD    Discharging Nurse: [facility] Unit/Room#: -A  Discharging Unit Phone Number: ***    Emergency Contact:   Extended Emergency Contact Information  Primary Emergency Contact: [address] Phone: [phone]  Relation: Child  Secondary Emergency Contact: Kulwinder Aguilar, [address] Phone: [phone]  Mobile Phone: [phone]  Relation: Child  Preferred language: English   needed? No    Past Surgical History:  Past Surgical History:   Procedure Laterality Date    BLADDER SURGERY      BLADDER SURGERY Left 3/17/2022    CYSTOSCOPY LEFT STENT EXCHANGE performed by Avila Johnston MD at [facility]  3/28/13    Cysto with removal of artificial sphinter and placement of suprapubic catheter. PROSTATE SURGERY      PROSTATECTOMY      infection       Immunization History: There is no immunization history on file for this patient.     Active Problems:  Patient Active Problem List   Diagnosis Code    Gait disturbance R26.9    Generalized weakness R53.1    Diabetes mellitus ([address] E11.9    Osteoarthritis M19.90    Anemia of chronic disorder D63.8    Infected implanted bladder sphincter cuff T83.69XA    Escherichia coli urinary tract infection N39.0, B96.20    Hypertension I10    Sepsis ([address] A41.9    Type 2 diabetes mellitus with hypoglycemia without coma ([facility]) E11.649    UTI (urinary tract infection) due to urinary indwelling catheter ([facility]) T83.511A, N39.0    Hyperkalemia E87.5    Acute kidney failure ([facility]) N17.9    Leg weakness, bilateral R29.898    Type 2 diabetes mellitus with neurological manifestations, uncontrolled ([facility]) B92.75, B86.70    Complicated UTI (urinary tract infection) N39.0    Essential hypertension I10    Severe muscle deconditioning R29.898    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 (HCC) E11.69, E78.5    Frequent falls R29.6    Chronic kidney disease, stage 3a (HCC) N18.31    Uncontrolled type 2 diabetes mellitus with peripheral neuropathy (HCC) E11.42, E11.65    Prostate cancer ([facility] C61    Suprapubic catheter ([facility] Z93.59    Sepsis due to urinary tract infection ([facility] A41.9, N39.0    Coagulase negative Staphylococcus bacteremia R78.81, B95.7    Chronic kidney disease, stage IV (severe) ([facility]) D77.8    Acute metabolic encephalopathy R03.39    SVT (supraventricular tachycardia) ([facility]) M63.6    Metabolic encephalopathy Q52.62    History of prostate cancer Z85.46    Cigarette nicotine dependence without complication J35.126       Isolation/Infection:   Isolation            Contact          Patient Infection Status       Infection Onset Added Last Indicated Last Indicated By Review Planned Expiration Resolved Resolved By    ESBL (Extended Spectrum Beta Lactamase) 21 Culture, Urine        MDRO (multi-drug resistant organism) 21 Culture, Urine        STAPHYLOCOCCUS CAPITIS  blood 2 3/22/22    Resolved    COVID-19 (Rule Out) 22 COVID-19, Rapid (Ordered)   22 Rule-Out Test Resulted    COVID-19 (Rule Out) 22 COVID-19, Rapid (Ordered)   22 Rule-Out Test Resulted    COVID-19 22 COVID-19, Rapid   22     COVID-19 (Rule Out) 22 COVID-19, Rapid (Ordered)   22 Rule-Out Test Resulted    COVID-19 (Rule Out) 21 COVID-19, Rapid (Ordered)   21 Rule-Out Test Resulted    MRSA  16 Jeri Tucker RN   18 Jeri Tucker RN    16 urine            Nurse Assessment:  Last Vital Signs: /69   Pulse 86   Temp 98.8 °F (37.1 °C) (Oral)   Resp 18   Ht 5' 8\" (1.727 m)   Wt 194 lb (88 kg)   SpO2 100%   BMI 29.50 kg/m²     Last documented pain score (0-10 scale): Pain Level: 0  Last Weight:   Wt Readings from Last 1 Encounters:   04/18/22 194 lb (88 kg)     Mental Status:  oriented and alert    IV Access:  - None    Nursing Mobility/ADLs:  Walking   Assisted  Transfer  Assisted  Bathing  Assisted  Dressing  Assisted  Toileting  Assisted  Feeding  Assisted  Med Admin  Assisted  Med Delivery   whole    Wound Care Documentation and Therapy:        Elimination:  Continence: Bowel: Yes  Bladder: No  Urinary Catheter: Last Change Date 04/13/22    Colostomy/Ileostomy/Ileal Conduit: {NO}       Date of Last BM: *04/20/22**    Intake/Output Summary (Last 24 hours) at 4/20/2022 1351  Last data filed at 4/20/2022 0620  Gross per 24 hour   Intake --   Output 1700 ml   Net -1700 ml     I/O last 3 completed shifts:  In: -   Out: 1700 [Urine:1700]    Safety Concerns: At Risk for Falls    Impairments/Disabilities:      None    Patient's personal belongings (please select all that are sent with patient):  Clothes, paperwork    RN SIGNATURE:  Electronically signed by Saul Gutierrez RN on 4/22/22 at 4:58 PM EDT              PHYSICIAN SECTION    Nutrition Therapy:  Current Nutrition Therapy:   - Oral Diet:  General    Routes of Feeding: Oral  Liquids: Thin Liquids  Daily Fluid Restriction: no  Last Modified Barium Swallow with Video (Video Swallowing Test): not done    Treatments at the Time of Hospital Discharge:   Respiratory Treatments: prn  Oxygen Therapy:  is not on home oxygen therapy.   Ventilator:    - No ventilator support    Rehab Therapies: Physical Therapy and Occupational Therapy  Weight Bearing Status/Restrictions: No weight bearing restrictions  Other Medical Equipment (for information only, NOT a DME order):  wheelchair, walker, cane,and bedside commode  Other Treatments: ***    Prognosis: Good    Condition at Discharge: Stable    Rehab Potential (if transferring to Rehab): Good    Recommended Labs or Other Treatments After Discharge: Weekly labs drawn on Monday during the course of antibiotic treatment  CBC with differential, CMP, ESR, CRP, UA, urine culture  Fax results to Attn: [place] Staff  # [phone]    Physician Certification: I certify the above information and transfer of Fatemeh Whiting  is necessary for the continuing treatment of the diagnosis listed and that he requires Dae Mickey for greater 30 days.      Update Admission H&P: No change in H&P    PHYSICIAN SIGNATURE:  Electronically signed by Faheem Mathis MD on 4/22/22 at 4:43 PM EDT

## 2022-04-20 NOTE — PLAN OF CARE
Problem: Skin Integrity:  Goal: Absence of new skin breakdown  Description: Absence of new skin breakdown  Outcome: Met This Shift     Problem: Nutritional:  Goal: Consumption of food in small portions  Description: Consumption of food in small portions  Outcome: Met This Shift  Goal: Consumption of liquid of appropriate consistency  Description: Consumption of liquid of appropriate consistency  Outcome: Met This Shift     Problem: Respiratory:  Goal: Ability to maintain a clear airway will improve  Description: Ability to maintain a clear airway will improve  Outcome: Met This Shift  Goal: Absence of aspiration  Description: Absence of aspiration  Outcome: Met This Shift     Problem: Safety:  Goal: Ability to chew and swallow food without choking will improve  Description: Ability to chew and swallow food without choking will improve  Outcome: Met This Shift  Goal: Ability to demonstrate good, daily oral hygiene techniques will improve  Description: Ability to demonstrate good, daily oral hygiene techniques will improve  Outcome: Met This Shift  Goal: Maintenance of upright position during and after feeding  Description: Maintenance of upright position during and after feeding  Outcome: Met This Shift     Problem: Falls - Risk of:  Goal: Will remain free from falls  Description: Will remain free from falls  Outcome: Met This Shift  Goal: Absence of physical injury  Description: Absence of physical injury  Outcome: Met This Shift     Problem: Skin Integrity:  Goal: Will show no infection signs and symptoms  Description: Will show no infection signs and symptoms  Outcome: Ongoing     Problem: Respiratory:  Goal: Will remain free from infection  Description: Will remain free from infection  Outcome: Ongoing

## 2022-04-20 NOTE — PROGRESS NOTES
Occupational Therapy    [facility] ACUTE CARE OCCUPATIONAL THERAPY EVALUATION  Viviana Carty, 1940, 2016/2016-A, 4/20/2022    History  [place]:  The primary encounter diagnosis was Altered mental status, unspecified altered mental status type. Diagnoses of Hypoglycemia, Hypotension, unspecified hypotension type, Pyuria, Chronic renal impairment, unspecified CKD stage, Hypernatremia, and Ureteral stent retained were also pertinent to this visit. Patient  has a past medical history of Acute urinary tract infection, Ataxia, Diabetes mellitus ([address] Fusion of spine of cervical region, Gait disturbance, History of prostate cancer, History of tobacco use, Hyperlipidemia, and Osteoarthritis. Patient  has a past surgical history that includes Prostate surgery; other surgical history (3/28/13); Colon surgery; Bladder surgery; Prostatectomy (1996); and Bladder surgery (Left, 3/17/2022). Subjective:  Patient states:  \"I am proud of myself\". Pt stated about session    Pain:  Intermittent pain near catheter site \"pulling\". Communication with other providers:  Handoff to RN, co-eval with PT.    Restrictions: Suprapubic catheter, Contact Precautions, General Precautions, Fall Risk    Home Setup/Prior level of function     *PT reviewed and confirmed the information below with Son:  Social/Functional History  Lives With: Son  Type of Home: House  Home Layout: One level  Home Access: Ramped entrance  Bathroom Shower/Tub: Tub/Shower unit,Shower chair with back  Bathroom Toilet: Standard  Bathroom Equipment: Grab bars in shower,Shower chair  Bathroom Accessibility: Chel Alcantar accessible (states once he is in bathroom he uses countertops and grab bars, but pt not able to clarify if this is because there is little room or just preference)  Home Equipment: [address] walker (pt thinks there may be a BSC from his wife in storage, but unsure)  ADL Assistance: [facility] [address]: Independent  Homemaking Responsibilities: Yes  Meal Prep Responsibility: Secondary  Laundry Responsibility: Primary  Cleaning Responsibility: Secondary  Bill Paying/Finance Responsibility: Primary  Shopping Responsibility: Secondary  Health Care Management: Primary (has pill box with alarms per pt. until recently [facility] nursing would set up, but once [facility] stopped he managed on own)  Ambulation Assistance: Independent (mod I with 4ww for up to 150'. pt used electronic carts at stores when able)  Transfer Assistance: Independent  Active : No  Patient's  Info: 3 sons assist in driving. 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  Pt occasionally writes checks and some auto pay  Additional Comments: pt sleeps in flat bed. Pt has fallen >4 times in past year. Pt attributes many to low BS.  No injury  *Pt was with recent admission March 2022, was d/c to [facility], and then to [facility]     Examination of body systems (includes body structures/functions, activity/participation limitations):  · Observation:  Supine in bed upon arrival, agreeable to therapy   · [facility]  · [facility]  · Cardiopulmonary:  No 02 needs      Body Systems and functions:  · ROM R/L:  LUE ~90 degrees shoulder flexion, distal WFL; RUE ~45 degrees shoulder flexion, distal WFL    · Strength R/L:  LUE: 4-/5, RUE: 3/5   · Sensation: Denies numbness  · Tone: Normal  · Coordination: Decreased speed BUE  · Perception: min decreased initiation/sequencing    Activities of Daily Living (ADLs):  · Feeding: Ayse  · Grooming: CGA (washing hands/face w/ warm washcloth)  · UB bathing: SBA  · LB bathing: maxA (washing dada area/buttocks in stand)  · UB dressing: SBA  · LB dressing: maxA (doffing/donning socks)  · Toileting: maxA (See LB bathing/dressing for details)    Cognitive and Psychosocial Functioning:  · Overall cognitive status: AxO x 1 self only, decreased short term memory, decreased problem solving, decreased safety awareness, min re-direction to task, decreased initiation/sequencing  · Affect: Pleasant       Mobility:  · Supine to sit:  charo-modA  · Transfers: Charo  · Sitting balance:  SBA. · Standing balance:  CGA. · Functional Mobility Charo w/ RW ~5 feet before fatiguing  · Toilet/Shower Transfers: DNT             AM-PAC Daily Activity Inpatient   How much help for putting on and taking off regular lower body clothing?: Total  How much help for Bathing?: A Lot  How much help for Toileting?: Total  How much help for putting on and taking off regular upper body clothing?: None  How much help for taking care of personal grooming?: A Little  How much help for eating meals?: None  AM-PAC Inpatient Daily Activity Raw Score: 15  AM-PAC Inpatient ADL T-Scale Score : 34.69  ADL Inpatient CMS 0-100% Score: 56.46  ADL Inpatient CMS G-Code Modifier : CK    Treatment:  Self Care Training:   Cues were given for safety, sequence, UE/LE placement, visual cues, and balance. Activities performed today included grooming, LB bathing/dressing, toileting    Safety: patient left in chair with chair alarm, call light within reach, RN notified, gait belt used. Assessment:  Pt is a 79 yo male admitted from home for metabolic encephalopathy. Pt at baseline is Independent for ADLs needs assistance for high level IADLs and is Independent for functional transfers/mobility w/ AD. Pt currently presents w/ deficits in ADL and high level IADL independence, functional activity tolerance, dynamic sitting and standing balance and tolerance and functional transfers, BUE strength and cognition. Pt would benefit from continued acute care OT services w/ discharge to SNF  Complexity: Moderate  Prognosis: Good, no significant barriers to participation at this time. Plan  Times per Week: 3x+  Times per Day: Daily  Current Treatment Recommendations: Strengthening,Balance training,ROM,Functional mobility training,Endurance training,Cognitive reorientation,Pain management,Safety education & training,Neuromuscular re-education,Equipment evaluation, education, & procurement,Patient/Caregiver education & training,Positioning,Self-Care / ADL,Home management training,Cognitive/Perceptual training,Coordination training     Equipment: defer    Goals:  Pt goal: go home  Time Frame for STGs: discharge  Goal 1: Pt will perform UE ADLs Independent  Goal 2: Pt will perform LE ADLs Ayse w/ AD  Goal 3: Pt will perform toileting Ayse  Goal 4: Pt will perform functional transfer w/ AD Ayse  Goal 5: Pt will perform functional mobility w/ AD Ayse  Goal 6: Pt will perform therex/theract in order to increase functional activity tolerance and dynamic standing balance    Treatment plan:  Pt will perform functional task in stand reaching in all 3 planes in order to increase dynamic standing balance and functional activity tolerance    Recommendations for NURSING activity: Up to chair for all 3 meals and up to standard commode for all toileting needs    Time:   Time in: 1550  Time out: 1629  Timed treatment minutes: 24 minutes  Total time: 39 mintues    Electronically signed by:    Ernesto BOO/L [id number]  5:38 PM,4/20/2022

## 2022-04-20 NOTE — CARE COORDINATION
Chart reviewed, in to see pt for dc planning. Introduced self and role explained. Pt was admitted for AMS secondary to UTI and metabolic encephalopathy. He had a recent admission with discharge to ARU followed by continued therapy at [facility] since April 14. Pt is agreeable to returning to [facility] for continued therapy if this is recommended by therapy with his long term plan to return home with his son/Jon and [facility]. Requested PT/OT orders be placed via PS to Dr. Adrianne Rosa. Spoke with Tomasa/admissions at [facility] who states patient is able to return for continued therapy once medically stable, he does not need a pre-cert though will need a rapid COVID to return. CM remains available if needs arise. Tentative plan is pt returning to [facility] at discharge for rehab. He will need a rapid COVID screening. Please call report to [phone] and fax orders, AVS, and discharge summaries to [phone].

## 2022-04-20 NOTE — CONSULTS
Consult completed. Nexiva 22g 1.75 inch catheter inserted via ultrasound in patient's LFA. Brisk blood return noted and catheter flushes with ease. Patient tolerated well. Mary Santos, primary RN notified. Consult IV/PICC team if patient's needs change.

## 2022-04-20 NOTE — PROGRESS NOTES
ATTENDING PHYSICIAN'S PROGRESS NOTES    Patient:  Fred Epps      Unit/Bed:2016/2016-A    YOB: 1940    MRN: [id number]     Acct: [de-identified]     Admit date: 4/18/2022    Patient Seen, Chart, Consults notes, Labs, Radiology studies reviewed. SUBJECTIVE:   Day 2 of stay with acute metabolic encephalopathy. Seen and examined at bedside. RN present. No new complaint. Alert and oriented x 3 . All other ROS negative except noted in HPI    Past, Family, Social History unchanged from admission. Diet:  ADULT DIET;  Regular; 4 carb choices (60 gm/meal)    Medications:  Scheduled Meds:   sodium zirconium cyclosilicate  10 g Oral BID    insulin lispro  0-6 Units SubCUTAneous TID WC    insulin lispro  0-3 Units SubCUTAneous Nightly    meropenem  1,000 mg IntraVENous Q12H    amLODIPine  10 mg Oral Daily    allopurinol  100 mg Oral Daily    sodium bicarbonate  650 mg Oral TID    atorvastatin  40 mg Oral Nightly    [Held by provider] carvedilol  3.125 mg Oral BID WC    levothyroxine  50 mcg Oral Daily    pregabalin  50 mg Oral BID    sodium chloride flush  5-40 mL IntraVENous 2 times per day    enoxaparin  30 mg SubCUTAneous Daily     Continuous Infusions:   sodium chloride      dextrose       PRN Meds:docusate sodium, traMADol, sodium chloride flush, sodium chloride, ondansetron **OR** ondansetron, polyethylene glycol, acetaminophen **OR** acetaminophen, potassium chloride **OR** potassium alternative oral replacement **OR** potassium chloride, magnesium sulfate, glucose, glucagon (rDNA), dextrose, dextrose bolus (hypoglycemia) **OR** dextrose bolus (hypoglycemia)    OBJECTIVE:  CBC:   Recent Labs     04/18/22  0811 04/18/22  0951 04/20/22  1327   WBC 8.0 9.9 7.5   HGB 7.7* 8.8* 8.3*    198 186     BMP:    Recent Labs     04/18/22  0951 04/20/22  0730 04/20/22  1327   * 132* 133*   K 5.3* 4.9 5.3*    101 101   CO2 26 20* 22   BUN 53* 36* 35*   CREATININE 2.4* 1.8* 1.8*   GLUCOSE 80 197* 171*     Calcium:  Recent Labs     04/20/22  1327   CALCIUM 8.6     Ionized Calcium:No results for input(s): IONCA in the last 72 hours. Magnesium:No results for input(s): MG in the last 72 hours. Phosphorus:No results for input(s): PHOS in the last 72 hours. BNP:No results for input(s): BNP in the last 72 hours. Glucose:  Recent Labs     04/20/22  0203 04/20/22  0357 04/20/22  0619   POCGLU 151* 135* 204*     HgbA1C: No results for input(s): LABA1C in the last 72 hours. INR:   Recent Labs     04/18/22  0951   INR 0.97     Hepatic:   Recent Labs     04/18/22  0951   ALKPHOS 98   ALT 30   AST 36   PROT 7.2   BILITOT 0.2   LABALBU 3.4     Amylase and Lipase:No results for input(s): LACTA, AMYLASE in the last 72 hours. Lactic Acid: No results for input(s): LACTA in the last 72 hours. Troponin:   Recent Labs     04/18/22  0951 04/20/22  0730   TROPONINT 0.040* 0.030*     BNP: No results for input(s): BNP in the last 72 hours. Lipids: No results for input(s): CHOL, TRIG, HDL, LDL, LDLCALC in the last 72 hours. ABGs: No results found for: PH, PCO2, PO2, HCO3, O2SAT    Radiology reports as per the Radiologist  Radiology:   CT Head WO Contrast  Result Date: 4/18/2022  EXAMINATION: CT OF THE HEAD WITHOUT CONTRAST  4/18/2022 11:53 am TECHNIQUE: CT of the head was performed without the administration of intravenous contrast. Dose modulation, iterative reconstruction, and/or weight based adjustment of the mA/kV was utilized to reduce the radiation dose to as low as reasonably achievable. COMPARISON: CT head without contrast 03/25/2020. HISTORY: ORDERING SYSTEM PROVIDED HISTORY: Altered mental status, last documented known well time 4 PM yesterday TECHNOLOGIST PROVIDED HISTORY: Reason for exam:->Altered mental status, last documented known well time 4 PM yesterday Has a \"code stroke\" or \"stroke alert\" been called? ->No Reason for Exam: Altered mental status, last documented known well time 4 PM yesterday FINDINGS: BRAIN/VENTRICLES: No mass effect, edema or hemorrhage is seen. Stable chronic is seen right lateral basal ganglia/external region. Mild-to-moderate volume loss is seen in the brain with moderate chronic microvascular ischemic changes. No hydrocephalus or extra-axial fluid is seen. ORBITS: The visualized portion of the orbits demonstrate no acute abnormality. SINUSES: The visualized paranasal sinuses and mastoid air cells demonstrate no acute abnormality. SOFT TISSUES/SKULL:  No acute abnormality of the visualized skull or soft tissues. No acute intracranial abnormality. RECOMMENDATIONS: Unavailable     CT ABDOMEN PELVIS W IV CONTRAST Additional Contrast? None  Result Date: 4/18/2022  EXAMINATION: CT OF THE ABDOMEN AND PELVIS WITH CONTRAST 4/18/2022 12:08 pm TECHNIQUE: CT of the abdomen and pelvis was performed with the administration of intravenous contrast. Multiplanar reformatted images are provided for review. Dose modulation, iterative reconstruction, and/or weight based adjustment of the mA/kV was utilized to reduce the radiation dose to as low as reasonably achievable. COMPARISON: 03/20/2022 HISTORY: ORDERING SYSTEM PROVIDED HISTORY: Abdominal distention, Abdominal pain, altered mentation, concern for sepsis TECHNOLOGIST PROVIDED HISTORY: Additional Contrast?->None Reason for exam:->Abdominal distention, Abdominal pain, altered mentation, concern for sepsis Decision Support Exception - unselect if not a suspected or confirmed emergency medical condition->Emergency Medical Condition (MA) Reason for Exam: Abdominal distention, Abdominal pain, altered mentation, concern for sepsis FINDINGS: Lower Chest:  Visualized portion of the lower chest demonstrates no acute abnormality. Organs: Left ureteral stent extends from the renal pelvis to the urinary bladder. Small foci of gas are noted in the left renal collecting system.  The liver, spleen, pancreas, gallbladder, adrenal glands, and kidneys demonstrate no acute abnormality. GI/Bowel: No acutely dilated or inflamed loops of bowel. Small hiatal hernia. Normal appendix. Pelvis: Suprapubic catheter is present. Trace amount of pelvic free fluid. No pelvic mass, adenopathy, or fluid collection. Peritoneum/Retroperitoneum: No abdominal aortic aneurysm. No adenopathy. No ascites. No free intraperitoneal air. Bones/Soft Tissues: No acute osseous abnormality. 1. No acute abnormality in the abdomen or pelvis. 2. Left ureteral stent and suprapubic catheter. No hydronephrosis. 3. Trace amount of pelvic free fluid is nonspecific. XR CHEST PORTABLE  Result Date: 4/18/2022  EXAMINATION: ONE XRAY VIEW OF THE CHEST 4/18/2022 10:08 am COMPARISON: None. HISTORY: ORDERING SYSTEM PROVIDED HISTORY: Altered mental status TECHNOLOGIST PROVIDED HISTORY: Reason for exam:->Altered mental status Reason for Exam: ams FINDINGS: The lungs are clear, no effusion. No pneumothorax. Heart is normal size. Mediastinal and hilar contours are within normal limits. Bony thorax no acute abnormality. 1. No acute cardiopulmonary abnormality. CTA HEAD NECK W CONTRAST  Result Date: 4/18/2022  EXAMINATION: CTA OF THE HEAD AND NECK WITH CONTRAST 4/18/2022 12:08 pm: TECHNIQUE: CTA of the head and neck was performed with the administration of intravenous contrast. Multiplanar reformatted images are provided for review. MIP images are provided for review. Stenosis of the internal carotid arteries measured using NASCET criteria. Dose modulation, iterative reconstruction, and/or weight based adjustment of the mA/kV was utilized to reduce the radiation dose to as low as reasonably achievable. COMPARISON: Concurrent head CT HISTORY: ORDERING SYSTEM PROVIDED HISTORY: Altered mental status, last documented known well time 4 PM yesterday FINDINGS: CTA NECK: AORTIC ARCH/ARCH VESSELS: No dissection or arterial injury. No significant stenosis of the brachiocephalic or subclavian arteries. CAROTID ARTERIES: No dissection, arterial injury, or hemodynamically significant stenosis by NASCET criteria. There is retropharyngeal course of the bilateral carotid arteries. There is circumferential wall thickening throughout the bilateral common carotid arteries without significant stenosis. VERTEBRAL ARTERIES: Severe stenosis involving origin of the left cervical vertebral artery. Moderate stenosis involving origin of the right cervical vertebral artery. SOFT TISSUES: The lung apices are clear. No cervical or superior mediastinal lymphadenopathy. The larynx and pharynx are unremarkable. No acute abnormality of the salivary and thyroid glands. There is mild circumferential wall thickening of the thoracic esophagus which impresses the dorsal aspect of the trachea, unchanged. BONES: No acute osseous abnormality. Extensive postsurgical changes throughout the cervical spine with posterior fusion instrumentation and decompressive laminectomies at C3 through C7 CTA HEAD: ANTERIOR CIRCULATION: Scattered vascular calcifications throughout the carotid siphons with moderate to severe narrowing of the right carotid terminus. There is focal high-grade stenosis involving the proximal left M2 segment with improved caliber distally. There is focal high-grade stenosis involving the proximal right A2 segment. POSTERIOR CIRCULATION: There is high-grade stenosis involving the mid to distal left V4 segment. The right intracranial vertebral artery and basilar artery are patent without high-grade stenosis or occlusion. Multi luminal irregularity throughout the proximal PCAs with high-grade stenosis involving the mid to distal right P2 segment. 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 OTHER: No dural venous sinus thrombosis on this non-dedicated study. BRAIN: No mass effect or midline shift. No extra-axial fluid collection. The gray-white differentiation is maintained.    1. No significant stenosis within the cervical ICAs per NASCET criteria. 2. Mild circumferential wall thickening throughout the bilateral common carotid arteries with retropharyngeal course. 3. Severe stenosis involving origin of the left cervical vertebral artery, moderate stenosis involving origin of the right cervical vertebral artery. 4. Extensive intracranial atherosclerosis resulting in multifocal areas of moderate to high-grade stenosis, as above. No large vessel occlusion. 5. 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 Physical Exam:  Vitals: /69   Pulse 86   Temp 98.8 °F (37.1 °C) (Oral)   Resp 18   Ht 5' 8\" (1.727 m)   Wt 194 lb (88 kg)   SpO2 100%   BMI 29.50 kg/m²   24 hour intake/output:    Intake/Output Summary (Last 24 hours) at 4/20/2022 1536  Last data filed at 4/20/2022 0620  Gross per 24 hour   Intake --   Output 1700 ml   Net -1700 ml     Last 3 weights: Wt Readings from Last 3 Encounters:   04/18/22 194 lb (88 kg)   04/14/22 194 lb 10.7 oz (88.3 kg)   03/28/22 201 lb (91.2 kg)     General appearance - alert, well appearing, and in no distress  HEENT: Normocephalic, Atraumatic, Conjuctiva pink, PERRL, Oral mucosa normal, Lips, teeth and gums normal, Trachea midline, Thyroid normal and No noted lymphadenopathy  Chest - clear to auscultation, no wheezes, rales or rhonchi, symmetric air entry  Cardiovascular - normal rate, regular rhythm, normal S1, S2, no murmurs, rubs, clicks or gallops  Abdomen - soft, nontender, nondistended, no masses or organomegaly  Suprapubic mccrary. Neurological - Alert and oriented, Normal speech, No focal findings or movement disorder noted and Motor and sensory grossly normal bilaterally  Integumentary - Skin color, texture, turgor normal. No Rashes or lesions  Musculoskeletal -Full ROM times 4 extremities, No clubbing or cyanosis and No peripheral edema      Diet ADULT DIET; Regular; 4 carb choices (60 gm/meal)   DVT Prophylaxis [x]?  Lovenox, []? Heparin, []? SCDs, []? Ambulation   GI Prophylaxis [x]? PPI,  []? H2 Blocker,  []? Carafate,  []? Diet/Tube Feeds   Code Status Full Code   Disposition Patient requires continued admission due to illness requiring intravenous antibiotics. MDM []? Low, []? Moderate,[x]? High  Patient's risk as above due to                   ASSESSMENT / PLAN :    Principal Problem:    Metabolic encephalopathy  Resolved Problems:    * No resolved hospital problems. *    Varinder Zayas is a 78 y.o.  male  who presents with Metabolic encephalopathy     #Metabolic encephalopathy with alteration in mental status likely related to infectious process.-Complicated UTI. Improved. Almost back to baseline. Continue treating infection. His CT scan of his head was negative for any acute findings. # Complicated Urinary tract infection: Pt with suprapubic mccrary. Has left ureteral stent on imaging. Urine culture with serratia marcesens sensitive to meropenem and cefepime. Procal 6. 27. Will d/c rocephin and start on meropenem. Consult ID. Will need urology follow up as out pt after uti treated. # Uncontrolled diabetes, with hypoglycemia, likely related to poor oral intake. Eating well now. D/ismael D5. Start low dose sliding scale and monitor. #Acute kidney injury on ckd 3: secondary to dehydration. Improved. Hydration as above. Repeat lab in AM.  # Hyperkalemia: Will give lokelma x 2 doses,recheck in AM.  # Hyponatremia: Mild. Sec to iv fluids but has been d/ismael. # Anemia: Sec to CKD. Monitor. #Elevated trop: In the setting of CKD and infection. No chestpain or EKG changes. Echo from last month with EF 55%. Norml LV function. Monitor. #Hypotension secondary to dehydration but resolved. .BP now high. Restarted Norvasc. Hold lisinopril sec renal failure  # Retained left ureteral stent. Will need urology after infection treated. # History of prostate cancer. Disposition; SNF when medically ready.     Surrogate decision maker: [place]       Electronically signed by Yolanda Karimi MD on 4/20/2022 at 3:36 PM    Rounding Hospitalist

## 2022-04-21 LAB
ANION GAP SERPL CALCULATED.3IONS-SCNC: 11 MMOL/L (ref 4–16)
BUN BLDV-MCNC: 31 MG/DL (ref 6–23)
CALCIUM SERPL-MCNC: 8.7 MG/DL (ref 8.3–10.6)
CHLORIDE BLD-SCNC: 101 MMOL/L (ref 99–110)
CO2: 25 MMOL/L (ref 21–32)
CREAT SERPL-MCNC: 1.7 MG/DL (ref 0.9–1.3)
EKG ATRIAL RATE: 500 BPM
EKG DIAGNOSIS: NORMAL
EKG Q-T INTERVAL: 374 MS
EKG QRS DURATION: 60 MS
EKG QTC CALCULATION (BAZETT): 428 MS
EKG R AXIS: -6 DEGREES
EKG T AXIS: 37 DEGREES
EKG VENTRICULAR RATE: 79 BPM
GFR AFRICAN AMERICAN: 47 ML/MIN/1.73M2
GFR NON-AFRICAN AMERICAN: 39 ML/MIN/1.73M2
GLUCOSE BLD-MCNC: 122 MG/DL (ref 70–99)
GLUCOSE BLD-MCNC: 131 MG/DL (ref 70–99)
GLUCOSE BLD-MCNC: 171 MG/DL (ref 70–99)
GLUCOSE BLD-MCNC: 172 MG/DL (ref 70–99)
GLUCOSE BLD-MCNC: 194 MG/DL (ref 70–99)
POTASSIUM SERPL-SCNC: 4.4 MMOL/L (ref 3.5–5.1)
SODIUM BLD-SCNC: 137 MMOL/L (ref 135–145)

## 2022-04-21 PROCEDURE — 2580000003 HC RX 258: Performed by: NURSE PRACTITIONER

## 2022-04-21 PROCEDURE — 2580000003 HC RX 258: Performed by: INTERNAL MEDICINE

## 2022-04-21 PROCEDURE — 99223 1ST HOSP IP/OBS HIGH 75: CPT | Performed by: INTERNAL MEDICINE

## 2022-04-21 PROCEDURE — 6360000002 HC RX W HCPCS: Performed by: NURSE PRACTITIONER

## 2022-04-21 PROCEDURE — APPSS60 APP SPLIT SHARED TIME 46-60 MINUTES: Performed by: NURSE PRACTITIONER

## 2022-04-21 PROCEDURE — 93010 ELECTROCARDIOGRAM REPORT: CPT | Performed by: INTERNAL MEDICINE

## 2022-04-21 PROCEDURE — 6370000000 HC RX 637 (ALT 250 FOR IP): Performed by: INTERNAL MEDICINE

## 2022-04-21 PROCEDURE — 2060000000 HC ICU INTERMEDIATE R&B

## 2022-04-21 PROCEDURE — 82962 GLUCOSE BLOOD TEST: CPT

## 2022-04-21 PROCEDURE — 6360000002 HC RX W HCPCS: Performed by: INTERNAL MEDICINE

## 2022-04-21 PROCEDURE — 80048 BASIC METABOLIC PNL TOTAL CA: CPT

## 2022-04-21 PROCEDURE — 94761 N-INVAS EAR/PLS OXIMETRY MLT: CPT

## 2022-04-21 RX ORDER — INSULIN LISPRO 100 [IU]/ML
0-6 INJECTION, SOLUTION INTRAVENOUS; SUBCUTANEOUS NIGHTLY
Status: DISCONTINUED | OUTPATIENT
Start: 2022-04-21 | End: 2022-04-23 | Stop reason: HOSPADM

## 2022-04-21 RX ORDER — INSULIN LISPRO 100 [IU]/ML
0-12 INJECTION, SOLUTION INTRAVENOUS; SUBCUTANEOUS
Status: DISCONTINUED | OUTPATIENT
Start: 2022-04-21 | End: 2022-04-23 | Stop reason: HOSPADM

## 2022-04-21 RX ORDER — ENOXAPARIN SODIUM 100 MG/ML
40 INJECTION SUBCUTANEOUS DAILY
Status: DISCONTINUED | OUTPATIENT
Start: 2022-04-22 | End: 2022-04-23 | Stop reason: HOSPADM

## 2022-04-21 RX ADMIN — SODIUM BICARBONATE 650 MG: 650 TABLET ORAL at 07:58

## 2022-04-21 RX ADMIN — ATORVASTATIN CALCIUM 40 MG: 40 TABLET, FILM COATED ORAL at 21:19

## 2022-04-21 RX ADMIN — SODIUM BICARBONATE 650 MG: 650 TABLET ORAL at 15:09

## 2022-04-21 RX ADMIN — CEFEPIME HYDROCHLORIDE 1000 MG: 1 INJECTION, POWDER, FOR SOLUTION INTRAMUSCULAR; INTRAVENOUS at 15:13

## 2022-04-21 RX ADMIN — SODIUM ZIRCONIUM CYCLOSILICATE 10 G: 10 POWDER, FOR SUSPENSION ORAL at 07:58

## 2022-04-21 RX ADMIN — MEROPENEM 1000 MG: 1 INJECTION, POWDER, FOR SOLUTION INTRAVENOUS at 06:11

## 2022-04-21 RX ADMIN — ENOXAPARIN SODIUM 30 MG: 100 INJECTION SUBCUTANEOUS at 07:57

## 2022-04-21 RX ADMIN — INSULIN LISPRO 1 UNITS: 100 INJECTION, SOLUTION INTRAVENOUS; SUBCUTANEOUS at 08:00

## 2022-04-21 RX ADMIN — PREGABALIN 50 MG: 50 CAPSULE ORAL at 21:20

## 2022-04-21 RX ADMIN — PREGABALIN 50 MG: 50 CAPSULE ORAL at 07:57

## 2022-04-21 RX ADMIN — ALLOPURINOL 100 MG: 100 TABLET ORAL at 07:58

## 2022-04-21 RX ADMIN — SODIUM BICARBONATE 650 MG: 650 TABLET ORAL at 21:20

## 2022-04-21 RX ADMIN — AMLODIPINE BESYLATE 10 MG: 10 TABLET ORAL at 07:57

## 2022-04-21 RX ADMIN — SODIUM CHLORIDE, PRESERVATIVE FREE 10 ML: 5 INJECTION INTRAVENOUS at 21:25

## 2022-04-21 RX ADMIN — INSULIN LISPRO 2 UNITS: 100 INJECTION, SOLUTION INTRAVENOUS; SUBCUTANEOUS at 18:14

## 2022-04-21 RX ADMIN — CEFEPIME HYDROCHLORIDE 1000 MG: 1 INJECTION, POWDER, FOR SOLUTION INTRAMUSCULAR; INTRAVENOUS at 21:19

## 2022-04-21 RX ADMIN — LEVOTHYROXINE SODIUM 50 MCG: 25 TABLET ORAL at 06:12

## 2022-04-21 RX ADMIN — SODIUM CHLORIDE, PRESERVATIVE FREE 10 ML: 5 INJECTION INTRAVENOUS at 07:58

## 2022-04-21 RX ADMIN — CARVEDILOL 3.12 MG: 6.25 TABLET, FILM COATED ORAL at 18:14

## 2022-04-21 ASSESSMENT — PAIN SCALES - GENERAL
PAINLEVEL_OUTOF10: 0
PAINLEVEL_OUTOF10: 0

## 2022-04-21 NOTE — CONSULTS
Infectious Disease Consult Note  2022   Patient Name: Kade Myers : 1940   Impression   Sepsis Secondary to Complicated UTI, Serratia marcescens, from Chronic Urinary Retention, SPC, Possible CAUTI:    · Left Ureteral Stent Intact:    · Afebrile, no leukocytosis  · -Urine culture: Serratia marcescens >100,000  · -UA WBC 1142, RBC 36  · Pct 6.27  · -CT A&P W IV Contrast: no acute abnormality in the A&P. Left ureteral stent and suprapubic catheter. No hydronephrosis. Trace amount of pelvic free fluid is nonspecific. · Has SPC, exchanged monthly per urology  · 3/17/22 s/p per Dr. Newsome [place]: cystoscopy, left ureteral stent exchange  ·  -CT A&P W IV Contrast: no acute abnormality in the A&P. Left ureteral stent and suprapubic catheter. No hydronephrosis. Trace amount of pelvic free fluid is nonspecific. · Urology manages patient as OP, plan for every 3 month left ureteral stent exchanges.  DMII    · PAMELLA:    · Metabolic Encephalopathy:    · Tobacco Abuse    · OA    · H/o Prostate Cancer S/p Prostatectomy     Multi-morbidity: per PMHx:  DMII, fusion of cervical spine , prostate cancer, s/p prostatectomy , tobacco abuse, HLD, OA, recurrent UTIs, SPC intact, dementia     Plan:   DC IV meropenem   · Start IV cefepime 1 gm q8h (renal dosing)  · Will plan to transition cefepime to oral Bactrim for discharge, for a total duration of 14 days   Trend CRP and Pct, ordered  · Consult placed to urology to evaluate for recurrent complicated UTI    Thank you for allowing me to consult in the care of this patient.  ------------------------  REASON FOR CONSULT: Infective syndrome     Requested by: Dr. Nella Carreon is a 78 y.o.  -American male with a history of DMII, cervical spine fusion, prostate cancer, recurrent UTIs with a SPC intact, and dementia who was admitted 2022 for further evaluation and management of AMS noticed per staff at the [facility], where patient resides. His blood glucose was in the 50s, SBP in the 80s. He then aroused after given a turkey sandwich. He then complained of generalized abdominal tenderness. He was hospitalized from  3/16-3/30/22 with sepsis secondary to K.pneumoniae UTI. He underwent on 3/17/22 cystoscopy with left stent exchange per Dr. Marcial Pearson. He was managed with IV meropenem x 6 days and transitioned to oral cephalexin for 7 days. Patient reports his wife passed away in September, 2021, and he has been under a lot of stress. ? Infectious diseases service was consulted to evaluate the pt, and recommend further investigative and therapeutic measures. ROS: Other systems reviewed Including eyes, ENT, respiratory, cardiovascular, GI, , dermatologic, neurologic, psych, hem/lymphatic, musculoskeletal and endocrine were negative other than what is mentioned above.      Patient Active Problem List    Diagnosis Date Noted    Type 2 diabetes mellitus with hypoglycemia without coma ([address] 05/02/2016    Generalized weakness 03/15/2012    Anemia of chronic disorder 03/16/2012    Gait disturbance 03/15/2012    Hypertension 03/23/2013    History of prostate cancer     Cigarette nicotine dependence without complication     Metabolic encephalopathy 80/88/9704    Coagulase negative Staphylococcus bacteremia 03/27/2022    Chronic kidney disease, stage IV (severe) ([address] 03/27/2022    Acute metabolic encephalopathy 86/20/7790    SVT (supraventricular tachycardia) ([address] 03/27/2022    Sepsis due to urinary tract infection ([address] 03/17/2022    Chronic kidney disease, stage 3a ([address]     Uncontrolled type 2 diabetes mellitus with peripheral neuropathy ([address]     Prostate cancer ([address]     Suprapubic catheter ([address]     Essential hypertension 11/17/2021    Severe muscle deconditioning 11/17/2021    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 ([address] 11/17/2021    Frequent falls 43/07/2674    Complicated UTI (urinary tract infection) 2020    Leg weakness, bilateral 2016    Type 2 diabetes mellitus with neurological manifestations, uncontrolled ([address] 2016    Hyperkalemia 2016    Acute kidney failure ([address] 2016    UTI (urinary tract infection) due to urinary indwelling catheter ([address] 2016    Sepsis ([address] 2014    Infected implanted bladder sphincter cuff 2013    Escherichia coli urinary tract infection 2013    Diabetes mellitus ([address]     Osteoarthritis      Past Medical History:   Diagnosis Date    Acute urinary tract infection 3/15/2012    Ataxia 2010    Diabetes mellitus ([address] 2011    type 2, controlled    Fusion of spine of cervical region 10/2012    Gait disturbance     History of prostate cancer     adenocarcinoma    History of tobacco use     Hyperlipidemia     Osteoarthritis       Past Surgical History:   Procedure Laterality Date    BLADDER SURGERY      BLADDER SURGERY Left 3/17/2022    CYSTOSCOPY LEFT STENT EXCHANGE performed by Garland Ocampo MD at [place] [id number]  3/28/13    Cysto with removal of artificial sphinter and placement of suprapubic catheter.  PROSTATE SURGERY      PROSTATECTOMY      infection      Family History   Problem Relation Age of Onset    Cancer Mother     Diabetes Father     Heart Disease Father     High Blood Pressure Father     Diabetes Sister     High Blood Pressure Sister     Cancer Brother         prostate, breast    Diabetes Brother     Cancer Maternal Uncle     Diabetes Maternal Grandmother     Stroke Maternal Grandfather       Infectious disease related family history - not contibutory.    SOCIAL HISTORY  Social History     Tobacco Use    Smoking status: Former Smoker     Packs/day: 0.50     Quit date: 1976     Years since quittin.8    Smokeless tobacco: Never Used   Substance Use Topics    Alcohol use: No     Comment: Quit in 1993       Born: [place], [place]   Lives: [place], [place] with son  [place] [facility] Occupation: Retired as a supervisor at [employer] No recent travel of significance.  No recent unusual exposures.  NO pets   ? ALLERGIES  No Known Allergies   MEDICATIONS  Reviewed and are per the chart/EMR. ? Antibiotics:   Present:  Cefepime 4/21-    Past:  ceftriaxone 4/18  Cefuroxime 4/19  Meropenem 4/20-21?  -------------------------------------------------------------------------------------------------------------------    Vital Signs:  Vitals:    04/21/22 0755   BP: (!) 161/75   Pulse: 85   Resp: 18   Temp: 98.5 °F (36.9 °C)   SpO2: 100%         Exam:    VS: noted; wt 194 lb (88 kg), Height 5'8\"  Gen: alert and oriented X3, no distress  Skin: no stigmata of endocarditis  Wounds: suprapubic catheter site without erythema or edema   HEMT: AT/NC Oropharynx pink, moist, and without lesions or exudates; upper dentures intact, edentulous lower. Eyes: PERRLA, EOMI, conjunctiva pink, sclera anicteric. Neck: Supple. Trachea midline. No LAD. Chest: no distress and CTA. Good air movement. Heart: RRR and no MRG. Abd: soft, non-distended, slight generalized abdominal tenderness to light palpation, no hepatomegaly. Normoactive bowel sounds. Ext: no clubbing, cyanosis, or edema  SPCatheter Site: without erythema or tenderness draining clear yellow urine  Neuro: Mental status intact. CN 2-12 intact and no focal sensory or motor deficits    ? Diagnostic Studies: reviewed  4/18/22 XR Chest Portable:  Impression       1. No acute cardiopulmonary abnormality. 4/18/22 CT Head WO Contrast:  Impression   No acute intracranial abnormality. 4/18/22 CTA Head Neck W Contrast:  Impression   1. No significant stenosis within the cervical ICAs per NASCET criteria. 2. Mild circumferential wall thickening throughout the bilateral common   carotid arteries with retropharyngeal course.    3. Severe stenosis involving origin of the left cervical vertebral artery,   moderate stenosis involving origin of the right cervical vertebral artery. 4. Extensive intracranial atherosclerosis resulting in multifocal areas of   moderate to high-grade stenosis, as above.  No large vessel occlusion. 5. 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 4/18/22 CT Abdomen Pelvis W IV Contrast:  Impression   1. No acute abnormality in the abdomen or pelvis. 2. Left ureteral stent and suprapubic catheter.  No hydronephrosis. 3. Trace amount of pelvic free fluid is nonspecific.         ?? I have examined this patient and available medical records on this date and have made the above observations, conclusions and recommendations. Electronically signed by: Electronically signed by Emmanuel Dee.  VIVIANA Tate CNP on 4/21/2022 at 8:27 AM

## 2022-04-21 NOTE — PROGRESS NOTES
ATTENDING PHYSICIAN'S PROGRESS NOTES    Patient:  Thomas Quintanilla      Unit/Bed:2016/2016-A    YOB: 1940    MRN: [id number]     Acct: [de-identified]     Admit date: 4/18/2022    Patient Seen, Chart, Consults notes, Labs, Radiology studies reviewed. SUBJECTIVE:   Day 3 of stay with acute metabolic encephalopathy. Seen and examined at bedside. No new complaint. Alert and oriented x 3 . All other ROS negative except noted in HPI    Past, Family, Social History unchanged from admission. Diet:  ADULT DIET;  Regular; 4 carb choices (60 gm/meal)    Medications:  Scheduled Meds:   cefepime  1,000 mg IntraVENous Q8H    sodium zirconium cyclosilicate  10 g Oral BID    insulin lispro  0-6 Units SubCUTAneous TID WC    insulin lispro  0-3 Units SubCUTAneous Nightly    amLODIPine  10 mg Oral Daily    allopurinol  100 mg Oral Daily    sodium bicarbonate  650 mg Oral TID    atorvastatin  40 mg Oral Nightly    carvedilol  3.125 mg Oral BID WC    levothyroxine  50 mcg Oral Daily    pregabalin  50 mg Oral BID    sodium chloride flush  5-40 mL IntraVENous 2 times per day    enoxaparin  30 mg SubCUTAneous Daily     Continuous Infusions:   sodium chloride      dextrose       PRN Meds:docusate sodium, traMADol, sodium chloride flush, sodium chloride, ondansetron **OR** ondansetron, polyethylene glycol, acetaminophen **OR** acetaminophen, potassium chloride **OR** potassium alternative oral replacement **OR** potassium chloride, magnesium sulfate, glucose, glucagon (rDNA), dextrose, dextrose bolus (hypoglycemia) **OR** dextrose bolus (hypoglycemia)    OBJECTIVE:  CBC:   Recent Labs     04/20/22  1327   WBC 7.5   HGB 8.3*        BMP:    Recent Labs     04/20/22  0730 04/20/22  1327 04/21/22  0847   * 133* 137   K 4.9 5.3* 4.4    101 101   CO2 20* 22 25   BUN 36* 35* 31*   CREATININE 1.8* 1.8* 1.7*   GLUCOSE 197* 171* 131*     Calcium:  Recent Labs     04/21/22  0847   CALCIUM 8.7 Ionized Calcium:No results for input(s): IONCA in the last 72 hours. Magnesium:No results for input(s): MG in the last 72 hours. Phosphorus:No results for input(s): PHOS in the last 72 hours. BNP:No results for input(s): BNP in the last 72 hours. Glucose:  Recent Labs     04/21/22  0747 04/21/22  1109 04/21/22  1514   POCGLU 171* 122* 172*     HgbA1C: No results for input(s): LABA1C in the last 72 hours. INR:   No results for input(s): INR in the last 72 hours. Hepatic:   No results for input(s): ALKPHOS, ALT, AST, PROT, BILITOT, BILIDIR, LABALBU in the last 72 hours. Amylase and Lipase:No results for input(s): LACTA, AMYLASE in the last 72 hours. Lactic Acid: No results for input(s): LACTA in the last 72 hours. Troponin:   Recent Labs     04/20/22  0730   TROPONINT 0.030*     BNP: No results for input(s): BNP in the last 72 hours. Lipids: No results for input(s): CHOL, TRIG, HDL, LDL, LDLCALC in the last 72 hours. ABGs: No results found for: PH, PCO2, PO2, HCO3, O2SAT    Radiology reports as per the Radiologist  Radiology:   CT Head WO Contrast  Result Date: 4/18/2022  EXAMINATION: CT OF THE HEAD WITHOUT CONTRAST  4/18/2022 11:53 am TECHNIQUE: CT of the head was performed without the administration of intravenous contrast. Dose modulation, iterative reconstruction, and/or weight based adjustment of the mA/kV was utilized to reduce the radiation dose to as low as reasonably achievable. COMPARISON: CT head without contrast 03/25/2020. HISTORY: ORDERING SYSTEM PROVIDED HISTORY: Altered mental status, last documented known well time 4 PM yesterday TECHNOLOGIST PROVIDED HISTORY: Reason for exam:->Altered mental status, last documented known well time 4 PM yesterday Has a \"code stroke\" or \"stroke alert\" been called? ->No Reason for Exam: Altered mental status, last documented known well time 4 PM yesterday FINDINGS: BRAIN/VENTRICLES: No mass effect, edema or hemorrhage is seen.   Stable chronic is seen right lateral basal ganglia/external region. Mild-to-moderate volume loss is seen in the brain with moderate chronic microvascular ischemic changes. No hydrocephalus or extra-axial fluid is seen. ORBITS: The visualized portion of the orbits demonstrate no acute abnormality. SINUSES: The visualized paranasal sinuses and mastoid air cells demonstrate no acute abnormality. SOFT TISSUES/SKULL:  No acute abnormality of the visualized skull or soft tissues. No acute intracranial abnormality. RECOMMENDATIONS: Unavailable     CT ABDOMEN PELVIS W IV CONTRAST Additional Contrast? None  Result Date: 4/18/2022  EXAMINATION: CT OF THE ABDOMEN AND PELVIS WITH CONTRAST 4/18/2022 12:08 pm TECHNIQUE: CT of the abdomen and pelvis was performed with the administration of intravenous contrast. Multiplanar reformatted images are provided for review. Dose modulation, iterative reconstruction, and/or weight based adjustment of the mA/kV was utilized to reduce the radiation dose to as low as reasonably achievable. COMPARISON: 03/20/2022 HISTORY: ORDERING SYSTEM PROVIDED HISTORY: Abdominal distention, Abdominal pain, altered mentation, concern for sepsis TECHNOLOGIST PROVIDED HISTORY: Additional Contrast?->None Reason for exam:->Abdominal distention, Abdominal pain, altered mentation, concern for sepsis Decision Support Exception - unselect if not a suspected or confirmed emergency medical condition->Emergency Medical Condition (MA) Reason for Exam: Abdominal distention, Abdominal pain, altered mentation, concern for sepsis FINDINGS: Lower Chest:  Visualized portion of the lower chest demonstrates no acute abnormality. Organs: Left ureteral stent extends from the renal pelvis to the urinary bladder. Small foci of gas are noted in the left renal collecting system. The liver, spleen, pancreas, gallbladder, adrenal glands, and kidneys demonstrate no acute abnormality. GI/Bowel: No acutely dilated or inflamed loops of bowel. Small hiatal hernia. Normal appendix. Pelvis: Suprapubic catheter is present. Trace amount of pelvic free fluid. No pelvic mass, adenopathy, or fluid collection. Peritoneum/Retroperitoneum: No abdominal aortic aneurysm. No adenopathy. No ascites. No free intraperitoneal air. Bones/Soft Tissues: No acute osseous abnormality. 1. No acute abnormality in the abdomen or pelvis. 2. Left ureteral stent and suprapubic catheter. No hydronephrosis. 3. Trace amount of pelvic free fluid is nonspecific. XR CHEST PORTABLE  Result Date: 4/18/2022  EXAMINATION: ONE XRAY VIEW OF THE CHEST 4/18/2022 10:08 am COMPARISON: None. HISTORY: ORDERING SYSTEM PROVIDED HISTORY: Altered mental status TECHNOLOGIST PROVIDED HISTORY: Reason for exam:->Altered mental status Reason for Exam: ams FINDINGS: The lungs are clear, no effusion. No pneumothorax. Heart is normal size. Mediastinal and hilar contours are within normal limits. Bony thorax no acute abnormality. 1. No acute cardiopulmonary abnormality. CTA HEAD NECK W CONTRAST  Result Date: 4/18/2022  EXAMINATION: CTA OF THE HEAD AND NECK WITH CONTRAST 4/18/2022 12:08 pm: TECHNIQUE: CTA of the head and neck was performed with the administration of intravenous contrast. Multiplanar reformatted images are provided for review. MIP images are provided for review. Stenosis of the internal carotid arteries measured using NASCET criteria. Dose modulation, iterative reconstruction, and/or weight based adjustment of the mA/kV was utilized to reduce the radiation dose to as low as reasonably achievable. COMPARISON: Concurrent head CT HISTORY: ORDERING SYSTEM PROVIDED HISTORY: Altered mental status, last documented known well time 4 PM yesterday FINDINGS: CTA NECK: AORTIC ARCH/ARCH VESSELS: No dissection or arterial injury. No significant stenosis of the brachiocephalic or subclavian arteries. CAROTID ARTERIES: No dissection, arterial injury, or hemodynamically significant stenosis by NASCET criteria. There is retropharyngeal course of the bilateral carotid arteries. There is circumferential wall thickening throughout the bilateral common carotid arteries without significant stenosis. VERTEBRAL ARTERIES: Severe stenosis involving origin of the left cervical vertebral artery. Moderate stenosis involving origin of the right cervical vertebral artery. SOFT TISSUES: The lung apices are clear. No cervical or superior mediastinal lymphadenopathy. The larynx and pharynx are unremarkable. No acute abnormality of the salivary and thyroid glands. There is mild circumferential wall thickening of the thoracic esophagus which impresses the dorsal aspect of the trachea, unchanged. BONES: No acute osseous abnormality. Extensive postsurgical changes throughout the cervical spine with posterior fusion instrumentation and decompressive laminectomies at C3 through C7 CTA HEAD: ANTERIOR CIRCULATION: Scattered vascular calcifications throughout the carotid siphons with moderate to severe narrowing of the right carotid terminus. There is focal high-grade stenosis involving the proximal left M2 segment with improved caliber distally. There is focal high-grade stenosis involving the proximal right A2 segment. POSTERIOR CIRCULATION: There is high-grade stenosis involving the mid to distal left V4 segment. The right intracranial vertebral artery and basilar artery are patent without high-grade stenosis or occlusion. Multi luminal irregularity throughout the proximal PCAs with high-grade stenosis involving the mid to distal right P2 segment. 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 OTHER: No dural venous sinus thrombosis on this non-dedicated study. BRAIN: No mass effect or midline shift. No extra-axial fluid collection. The gray-white differentiation is maintained. 1. No significant stenosis within the cervical ICAs per NASCET criteria.  2. Mild circumferential wall thickening throughout the bilateral common carotid arteries with retropharyngeal course. 3. Severe stenosis involving origin of the left cervical vertebral artery, moderate stenosis involving origin of the right cervical vertebral artery. 4. Extensive intracranial atherosclerosis resulting in multifocal areas of moderate to high-grade stenosis, as above. No large vessel occlusion. 5. 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 Physical Exam:  Vitals: BP (!) 161/75   Pulse 85   Temp 98.5 °F (36.9 °C) (Oral)   Resp 18   Ht 5' 8\" (1.727 m)   Wt 194 lb (88 kg)   SpO2 99%   BMI 29.50 kg/m²   24 hour intake/output:    Intake/Output Summary (Last 24 hours) at 4/21/2022 1621  Last data filed at 4/20/2022 1704  Gross per 24 hour   Intake --   Output 1400 ml   Net -1400 ml     Last 3 weights: Wt Readings from Last 3 Encounters:   04/18/22 194 lb (88 kg)   04/14/22 194 lb 10.7 oz (88.3 kg)   03/28/22 201 lb (91.2 kg)     General appearance - alert, well appearing, and in no distress  HEENT: Normocephalic, Atraumatic, Conjuctiva pink, PERRL, Oral mucosa normal, Lips, teeth and gums normal, Trachea midline, Thyroid normal and No noted lymphadenopathy  Chest - clear to auscultation, no wheezes, rales or rhonchi, symmetric air entry  Cardiovascular - normal rate, regular rhythm, normal S1, S2, no murmurs, rubs, clicks or gallops  Abdomen - soft, nontender, nondistended, no masses or organomegaly  Suprapubic mccrary. Neurological - Alert and oriented, Normal speech, No focal findings or movement disorder noted and Motor and sensory grossly normal bilaterally  Integumentary - Skin color, texture, turgor normal. No Rashes or lesions  Musculoskeletal -Full ROM times 4 extremities, No clubbing or cyanosis and No peripheral edema      Diet ADULT DIET; Regular; 4 carb choices (60 gm/meal)   DVT Prophylaxis [x]? Lovenox, []? Heparin, []? SCDs, []? Ambulation   GI Prophylaxis [x]? PPI,  []? H2 Blocker,  []? Carafate,  []?  Diet/Tube Feeds   Code Status Full Code   Disposition Patient requires continued admission due to illness requiring intravenous antibiotics. MDM []? Low, []? Moderate,[x]? High  Patient's risk as above due to                   ASSESSMENT / PLAN :    Principal Problem:    Metabolic encephalopathy  Resolved Problems:    * No resolved hospital problems. *    Brijesh Underwood is a 78 y.o.  male  who presents with Metabolic encephalopathy     #Metabolic encephalopathy likely related to infectious process.-Complicated UTI. Improved. Back to baseline. Continue treating infection. His CT scan of his head was negative for any acute findings. # Complicated Urinary tract infection:  Serratia marcescens, from Chronic Urinary Retention  Suprapubic catheter, Possible CAUTI:   Pt with suprapubic mccrary. Has left ureteral stent on imaging. Urine culture with serratia marcesens sensitive to meropenem and cefepime. Procal 6. 27. Will d/c rocephin and start on meropenem. Appreciate ID consult . Recommends inpatient urology eval.  DC'd meropenem and started patient on IV cefepime with plan to transition to p.o. Bactrim at discharge. Patient has CKD so will asked ID if Bactrim will still be okay. Trend CRP and procalcitonin    # Uncontrolled diabetes, with hypoglycemia, likely related to poor oral intake. Eating well now. D/ismael D5. Change sliding scale from low to medium and continue to monitor . #Acute kidney injury on ckd 3: secondary to dehydration. Improved with hydration. Creatinine down to 1. 7.continue to monitor. # Hyperkalemia: Resolved with Lokelma. Potassium 4.4. # Hyponatremia: Mild. Sec to iv fluids but has been d/ismael. Resolved. # Anemia: Sec to CKD. Monitor. #Elevated trop: In the setting of CKD and infection. No chestpain or EKG changes. Echo from last month with EF 55%. Norml LV function. Monitor. #Hypotension secondary to dehydration but resolved. .BP now high. Restarted Norvasc. Hold lisinopril sec renal failure. Restart Coreg. Monitor    # Retained left ureteral stent. Will need urology after infection treated. # History of prostate cancer. Urology consult    Disposition; SNF when medically ready.     Surrogate decision maker: Son       Electronically signed by Johnnie Mendez MD on 4/21/2022 at 4:21 PM    Rounding Hospitalist

## 2022-04-21 NOTE — CONSULTS
RENAL DOSE ADJUSTMENT MADE PER P/T PROTOCOL    PREVIOUS ORDER:  Enoxaparin 30mg subq daily    Estimated Creatinine Clearance: 35 mL/min (A) (based on SCr of 1.7 mg/dL (H)). Recent Labs     04/20/22  0730 04/20/22  0730 04/20/22  1327 04/20/22  1327 04/21/22  0847   BUN 36*  --  35*  --  31*   CREATININE 1.8*   < > 1.8*   < > 1.7*   PLT  --   --  186  --   --     < > = values in this interval not displayed. NEW RENALLY ADJUSTED ORDER:  ENOXAPARIN 40MG SUBQ DAILY    Jaden Darling.  Yvrose Kim, [facility]  4/21/2022 4:25 PM

## 2022-04-22 VITALS
OXYGEN SATURATION: 100 % | WEIGHT: 194 LBS | BODY MASS INDEX: 29.4 KG/M2 | TEMPERATURE: 97.5 F | SYSTOLIC BLOOD PRESSURE: 140 MMHG | DIASTOLIC BLOOD PRESSURE: 65 MMHG | HEART RATE: 77 BPM | HEIGHT: 68 IN | RESPIRATION RATE: 16 BRPM

## 2022-04-22 PROBLEM — E16.2 HYPOGLYCEMIA: Status: ACTIVE | Noted: 2022-04-22

## 2022-04-22 LAB
ANION GAP SERPL CALCULATED.3IONS-SCNC: 11 MMOL/L (ref 4–16)
BUN BLDV-MCNC: 32 MG/DL (ref 6–23)
CALCIUM SERPL-MCNC: 8.9 MG/DL (ref 8.3–10.6)
CHLORIDE BLD-SCNC: 104 MMOL/L (ref 99–110)
CO2: 23 MMOL/L (ref 21–32)
CREAT SERPL-MCNC: 1.9 MG/DL (ref 0.9–1.3)
GFR AFRICAN AMERICAN: 41 ML/MIN/1.73M2
GFR NON-AFRICAN AMERICAN: 34 ML/MIN/1.73M2
GLUCOSE BLD-MCNC: 150 MG/DL (ref 70–99)
GLUCOSE BLD-MCNC: 174 MG/DL (ref 70–99)
GLUCOSE BLD-MCNC: 190 MG/DL (ref 70–99)
GLUCOSE BLD-MCNC: 296 MG/DL (ref 70–99)
HIGH SENSITIVE C-REACTIVE PROTEIN: 19.3 MG/L
LACTATE: 0.9 MMOL/L (ref 0.4–2)
POTASSIUM SERPL-SCNC: 4.7 MMOL/L (ref 3.5–5.1)
PROCALCITONIN: 3.13
SARS-COV-2, NAAT: NOT DETECTED
SODIUM BLD-SCNC: 138 MMOL/L (ref 135–145)
SOURCE: NORMAL

## 2022-04-22 PROCEDURE — 6370000000 HC RX 637 (ALT 250 FOR IP): Performed by: INTERNAL MEDICINE

## 2022-04-22 PROCEDURE — 84145 PROCALCITONIN (PCT): CPT

## 2022-04-22 PROCEDURE — 83605 ASSAY OF LACTIC ACID: CPT

## 2022-04-22 PROCEDURE — 94761 N-INVAS EAR/PLS OXIMETRY MLT: CPT

## 2022-04-22 PROCEDURE — 6360000002 HC RX W HCPCS: Performed by: NURSE PRACTITIONER

## 2022-04-22 PROCEDURE — 36415 COLL VENOUS BLD VENIPUNCTURE: CPT

## 2022-04-22 PROCEDURE — 87635 SARS-COV-2 COVID-19 AMP PRB: CPT

## 2022-04-22 PROCEDURE — 6360000002 HC RX W HCPCS: Performed by: INTERNAL MEDICINE

## 2022-04-22 PROCEDURE — 99232 SBSQ HOSP IP/OBS MODERATE 35: CPT | Performed by: NURSE PRACTITIONER

## 2022-04-22 PROCEDURE — 2580000003 HC RX 258: Performed by: INTERNAL MEDICINE

## 2022-04-22 PROCEDURE — 80048 BASIC METABOLIC PNL TOTAL CA: CPT

## 2022-04-22 PROCEDURE — 86141 C-REACTIVE PROTEIN HS: CPT

## 2022-04-22 PROCEDURE — 97530 THERAPEUTIC ACTIVITIES: CPT

## 2022-04-22 PROCEDURE — 82962 GLUCOSE BLOOD TEST: CPT

## 2022-04-22 PROCEDURE — 2580000003 HC RX 258: Performed by: NURSE PRACTITIONER

## 2022-04-22 RX ORDER — SULFAMETHOXAZOLE AND TRIMETHOPRIM 800; 160 MG/1; MG/1
1 TABLET ORAL 2 TIMES DAILY
Qty: 24 TABLET | Refills: 0
Start: 2022-04-22 | End: 2022-05-04

## 2022-04-22 RX ORDER — ASPIRIN 81 MG/1
81 TABLET, CHEWABLE ORAL DAILY
Qty: 30 TABLET | Refills: 3 | Status: ON HOLD
Start: 2022-04-22

## 2022-04-22 RX ADMIN — ENOXAPARIN SODIUM 40 MG: 100 INJECTION SUBCUTANEOUS at 09:40

## 2022-04-22 RX ADMIN — INSULIN LISPRO 2 UNITS: 100 INJECTION, SOLUTION INTRAVENOUS; SUBCUTANEOUS at 17:56

## 2022-04-22 RX ADMIN — PREGABALIN 50 MG: 50 CAPSULE ORAL at 09:40

## 2022-04-22 RX ADMIN — PREGABALIN 50 MG: 50 CAPSULE ORAL at 20:43

## 2022-04-22 RX ADMIN — ATORVASTATIN CALCIUM 40 MG: 40 TABLET, FILM COATED ORAL at 20:43

## 2022-04-22 RX ADMIN — INSULIN LISPRO 6 UNITS: 100 INJECTION, SOLUTION INTRAVENOUS; SUBCUTANEOUS at 13:34

## 2022-04-22 RX ADMIN — CEFEPIME HYDROCHLORIDE 1000 MG: 1 INJECTION, POWDER, FOR SOLUTION INTRAMUSCULAR; INTRAVENOUS at 20:56

## 2022-04-22 RX ADMIN — CARVEDILOL 3.12 MG: 6.25 TABLET, FILM COATED ORAL at 17:51

## 2022-04-22 RX ADMIN — SODIUM CHLORIDE, PRESERVATIVE FREE 10 ML: 5 INJECTION INTRAVENOUS at 20:43

## 2022-04-22 RX ADMIN — ALLOPURINOL 100 MG: 100 TABLET ORAL at 09:40

## 2022-04-22 RX ADMIN — SODIUM CHLORIDE, PRESERVATIVE FREE 10 ML: 5 INJECTION INTRAVENOUS at 09:40

## 2022-04-22 RX ADMIN — CEFEPIME HYDROCHLORIDE 1000 MG: 1 INJECTION, POWDER, FOR SOLUTION INTRAMUSCULAR; INTRAVENOUS at 13:33

## 2022-04-22 RX ADMIN — SODIUM BICARBONATE 650 MG: 650 TABLET ORAL at 13:28

## 2022-04-22 RX ADMIN — CARVEDILOL 3.12 MG: 6.25 TABLET, FILM COATED ORAL at 09:53

## 2022-04-22 RX ADMIN — AMLODIPINE BESYLATE 10 MG: 10 TABLET ORAL at 09:40

## 2022-04-22 RX ADMIN — CEFEPIME HYDROCHLORIDE 1000 MG: 1 INJECTION, POWDER, FOR SOLUTION INTRAMUSCULAR; INTRAVENOUS at 06:13

## 2022-04-22 RX ADMIN — SODIUM BICARBONATE 650 MG: 650 TABLET ORAL at 09:40

## 2022-04-22 RX ADMIN — SODIUM BICARBONATE 650 MG: 650 TABLET ORAL at 20:43

## 2022-04-22 RX ADMIN — LEVOTHYROXINE SODIUM 50 MCG: 25 TABLET ORAL at 06:07

## 2022-04-22 ASSESSMENT — PAIN DESCRIPTION - PROGRESSION

## 2022-04-22 ASSESSMENT — PAIN SCALES - GENERAL: PAINLEVEL_OUTOF10: 0

## 2022-04-22 NOTE — PROGRESS NOTES
Infectious Disease Progress Note  2022   Patient Name: Fatemeh Whiting : 1940   Impression  · Sepsis Secondary to Complicated UTI, Serratia marcescens, from Chronic Urinary Retention, SPC, CAUTI:     ? Left Ureteral Stent Intact:     § Afebrile, no leukocytosis, CRP and Pct on downward trend, de-escalated to cefepime , tolerating well. Plan to DC on oral Bactrim. § -Urine culture: Serratia marcescens >100,000  § -UA WBC 1142, RBC 36  § -CT A&P W IV Contrast: no acute abnormality in the A&P. Left ureteral stent and suprapubic catheter. No hydronephrosis. Trace amount of pelvic free fluid is nonspecific. § Has SPC, exchanged monthly per urology  § 3/17/22 s/p per Dr. Jefferson Im: cystoscopy, left ureteral stent exchange  §  -CT A&P W IV Contrast: no acute abnormality in the A&P. Left ureteral stent and suprapubic catheter. No hydronephrosis. Trace amount of pelvic free fluid is nonspecific. § Urology manages patient as OP, plan for every 3 month left ureteral stent exchanges. § Urology consulted      · DMII     ? PAMELLA:     ? Metabolic Encephalopathy:     ? Tobacco Abuse     ? OA     ? H/o Prostate Cancer S/p Prostatectomy     · Multi-morbidity: per PMHx:  DMII, fusion of cervical spine , prostate cancer, s/p prostatectomy , tobacco abuse, HLD, OA, recurrent UTIs, SPC intact, dementia      Plan:  ? Continue IV cefepime 1 gm q8h (renal dosing)  ? hen ready for DC, DC cefepime and start po Bactrim 1 DS q12h (no renal dosing neWcessary as CrCl is above 30 ml/min per guidelines) end date 22  ? Follow up with ID in 2 weeks please  Weekly labs drawn on Monday during the course of treatment  CBC with differential, CMP, ESR, CRP, UA, urine culture  Fax results to Attn: Nirali Soolmon Infectious Diseases Staff  ? # [phone] OK from ID standpoint to DC when ready      Ongoing Antimicrobial Therapy  Cefepime -?   Completed Antimicrobial Therapy  ceftriaxone 4/18  Cefuroxime 4/19  Meropenem 4/20-21?  ? History:? Interval history noted. Chief complaint:  CAUTI. Denies n/v/d/f or untoward effects of antibiotics. Sitting up in the chair, states feels weak but denies abdominal discomfort. Physical Exam:  Vital Signs: BP (!) 104/49   Pulse 71   Temp 98 °F (36.7 °C) (Axillary)   Resp 16   Ht 5' 8\" (1.727 m)   Wt 194 lb (88 kg)   SpO2 99%   BMI 29.50 kg/m²     Gen: Resting quietly up in chair, no distress, A&O x 4. Wounds: suprapubic catheter site without erythema or edema   HEMT: AT/NC Oropharynx pink, moist, and without lesions or exudates; upper dentures intact, edentulous lower. Eyes: PERRLA, EOMI, conjunctiva pink, sclera anicteric. Neck: Supple. Trachea midline. No LAD. Chest: no distress and CTA. Good air movement. Heart: RRR and no MRG. Abd: soft, non-distended, slight generalized abdominal tenderness to light palpation, no hepatomegaly. Normoactive bowel sounds. Ext: no clubbing, cyanosis, or edema  SPCatheter Site: without erythema or tenderness draining clear yellow urine  Neuro: Mental status intact. CN 2-12 intact and no focal sensory or motor deficits     Radiologic / Imaging / TESTING  4/18/22 XR Chest Portable:  Impression       1. No acute cardiopulmonary abnormality.      4/18/22 CT Head WO Contrast:  Impression   No acute intracranial abnormality.      4/18/22 CTA Head Neck W Contrast:  Impression   1. No significant stenosis within the cervical ICAs per NASCET criteria. 2. Mild circumferential wall thickening throughout the bilateral common   carotid arteries with retropharyngeal course. 3. Severe stenosis involving origin of the left cervical vertebral artery,   moderate stenosis involving origin of the right cervical vertebral artery. 4. Extensive intracranial atherosclerosis resulting in multifocal areas of   moderate to high-grade stenosis, as above.  No large vessel occlusion.    5. 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      4/18/22 CT Abdomen Pelvis W IV Contrast:  Impression   1. No acute abnormality in the abdomen or pelvis. 2. Left ureteral stent and suprapubic catheter.  No hydronephrosis. 3. Trace amount of pelvic free fluid is nonspecific.           Labs:    Recent Results (from the past 24 hour(s))   POCT Glucose    Collection Time: 04/21/22 11:09 AM   Result Value Ref Range    POC Glucose 122 (H) 70 - 99 MG/DL   POCT Glucose    Collection Time: 04/21/22  3:14 PM   Result Value Ref Range    POC Glucose 172 (H) 70 - 99 MG/DL   POCT Glucose    Collection Time: 04/21/22  9:24 PM   Result Value Ref Range    POC Glucose 194 (H) 70 - 99 MG/DL   Basic Metabolic Panel    Collection Time: 04/22/22  3:16 AM   Result Value Ref Range    Sodium 138 135 - 145 MMOL/L    Potassium 4.7 3.5 - 5.1 MMOL/L    Chloride 104 99 - 110 mMol/L    CO2 23 21 - 32 MMOL/L    Anion Gap 11 4 - 16    BUN 32 (H) 6 - 23 MG/DL    CREATININE 1.9 (H) 0.9 - 1.3 MG/DL    Glucose 190 (H) 70 - 99 MG/DL    Calcium 8.9 8.3 - 10.6 MG/DL    GFR Non- 34 (L) >60 mL/min/1.73m2    GFR  41 (L) >60 mL/min/1.73m2   C-Reactive Protein    Collection Time: 04/22/22  3:16 AM   Result Value Ref Range    CRP, High Sensitivity 19.3 mg/L   Procalcitonin    Collection Time: 04/22/22  3:16 AM   Result Value Ref Range    Procalcitonin 3.13      CULTURE results: Invalid input(s): BLOOD CULTURE,  URINE CULTURE, SURGICAL CULTURE    Diagnosis:  Patient Active Problem List   Diagnosis    Gait disturbance    Generalized weakness    Diabetes mellitus ([facility]    Osteoarthritis    Anemia of chronic disorder    Infected implanted bladder sphincter cuff    Escherichia coli urinary tract infection    Hypertension    Sepsis (HCC)    Type 2 diabetes mellitus with hypoglycemia without coma (HCC)    UTI (urinary tract infection) due to urinary indwelling catheter (HCC)    Hyperkalemia    Acute kidney failure (HCC)    Leg weakness, bilateral    Type 2 diabetes mellitus with neurological manifestations, uncontrolled ([address]    Complicated UTI (urinary tract infection)    Essential hypertension    Severe muscle deconditioning    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 (HCC)    Frequent falls    Chronic kidney disease, stage 3a ([address]    Uncontrolled type 2 diabetes mellitus with peripheral neuropathy ([address]    Prostate cancer ([address]    Suprapubic catheter ([address]    Sepsis due to urinary tract infection ([address]    Coagulase negative Staphylococcus bacteremia    Chronic kidney disease, stage IV (severe) (HCC)    Acute metabolic encephalopathy    SVT (supraventricular tachycardia) (HCC)    Metabolic encephalopathy    History of prostate cancer    Cigarette nicotine dependence without complication    Altered mental status    Serratia marcescens infection       Active Problems  Principal Problem:    Metabolic encephalopathy  Active Problems:    Altered mental status    Serratia marcescens infection  Resolved Problems:    * No resolved hospital problems. *    Electronically signed by: Electronically signed by Lillian Rice.  VIVIANA Tate CNP on 4/22/2022 at 9:16 AM

## 2022-04-22 NOTE — CONSULTS
[facility] Hebert[facility]ckM[facility] Centerat [facility], [address]   Consult Note  [facility] 1 2 3 4 5    Date: 2022   Patient: Brien Bueno   : 1940   DOA: 2022   MRN: [id number]   ROOM#: -[id number]     Reason for Consult: Evaluate patient due to recent left stent placement, has Serratia marcescens UTI  Requesting Physician: Umesh Zavala, [facility]  Collaborating Urologist on Call at time of admission: Dr. House Ly: AMS    History Obtained From:  patient, electronic medical record    HISTORY OF PRESENT ILLNESS:                The patient is a 78 y.o. male with significant past medical history of prostate cancer s/p RPP in , DM, CKD, left hydronephrosis (managed with ureteral stent), and urinary retention (managed with SPT) who presented with AMS. Work-up revealed a Serratia UTI and PAMELLA. He underwent ureteral stent exchange and SPT exchange 3/17/22. He is currently feeling fatigued but otherwise has no pain or discomfort. ED Provider's HPI 22: Brien Buneo is a 78 y.o. male that presents to the emergency department with altered mental status. Available history is provided by EMS. Patient is a resident of the [facility], and nursing reports EMS that patient has been altered since about 4 PM yesterday. He has been less responsive and less active. There is no report of witnessed seizure activity or injury. There has been no reported focal weakness or ataxia. The symptoms appear constant in timing. EMS requested a stroke alert prior to arrival.  EMS also reports a blood sugar in the 40s. Glucagon 1 mg was given without change in mental status upon arrival patient does answer some basic yes or no questions appropriately. He denies any chest or abdominal pain but grimaces during abdominal exam.  He is unable to further qualify the pain or report any modifying factors. Patient reports no other particular provocative or alleviating factors.     Past Medical History:        Diagnosis Date    Acute urinary tract infection 3/15/2012    Ataxia 2010    Diabetes mellitus ([address] 2011    type 2, controlled    Fusion of spine of cervical region 10/2012    Gait disturbance 2011    History of prostate cancer 1996    adenocarcinoma    History of tobacco use 1959    Hyperlipidemia 2011    Osteoarthritis 2011     Past Surgical History:        Procedure Laterality Date    BLADDER SURGERY      BLADDER SURGERY Left 3/17/2022    CYSTOSCOPY LEFT STENT EXCHANGE performed by Ioana Ibarra MD at [place] [address]  3/28/13    Cysto with removal of artificial sphinter and placement of suprapubic catheter.     PROSTATE SURGERY      PROSTATECTOMY  1996    infection     Current Medications:   Current Facility-Administered Medications: cefepime (MAXIPIME) 1000 mg IVPB minibag, 1,000 mg, IntraVENous, Q8H  enoxaparin (LOVENOX) injection 40 mg, 40 mg, SubCUTAneous, Daily  insulin lispro (HUMALOG) injection vial 0-12 Units, 0-12 Units, SubCUTAneous, TID WC  insulin lispro (HUMALOG) injection vial 0-6 Units, 0-6 Units, SubCUTAneous, Nightly  amLODIPine (NORVASC) tablet 10 mg, 10 mg, Oral, Daily  docusate sodium (COLACE) capsule 100 mg, 100 mg, Oral, BID PRN  allopurinol (ZYLOPRIM) tablet 100 mg, 100 mg, Oral, Daily  traMADol (ULTRAM) tablet 25 mg, 25 mg, Oral, Q6H PRN  sodium bicarbonate tablet 650 mg, 650 mg, Oral, TID  atorvastatin (LIPITOR) tablet 40 mg, 40 mg, Oral, Nightly  carvedilol (COREG) tablet 3.125 mg, 3.125 mg, Oral, BID WC  levothyroxine (SYNTHROID) tablet 50 mcg, 50 mcg, Oral, Daily  pregabalin (LYRICA) capsule 50 mg, 50 mg, Oral, BID  sodium chloride flush 0.9 % injection 5-40 mL, 5-40 mL, IntraVENous, 2 times per day  sodium chloride flush 0.9 % injection 5-40 mL, 5-40 mL, IntraVENous, PRN  0.9 % sodium chloride infusion, , IntraVENous, PRN  ondansetron (ZOFRAN-ODT) disintegrating tablet 4 mg, 4 mg, Oral, Q8H PRN **OR** ondansetron (ZOFRAN) injection 4 mg, 4 mg, IntraVENous, Q6H PRN  polyethylene glycol (GLYCOLAX) packet 17 g, 17 g, Oral, Daily PRN  acetaminophen (TYLENOL) tablet 650 mg, 650 mg, Oral, Q6H PRN **OR** acetaminophen (TYLENOL) suppository 650 mg, 650 mg, Rectal, Q6H PRN  potassium chloride (KLOR-CON M) extended release tablet 40 mEq, 40 mEq, Oral, PRN **OR** potassium bicarb-citric acid (EFFER-K) effervescent tablet 40 mEq, 40 mEq, Oral, PRN **OR** potassium chloride 10 mEq/100 mL IVPB (Peripheral Line), 10 mEq, IntraVENous, PRN  magnesium sulfate 2000 mg in 50 mL IVPB premix, 2,000 mg, IntraVENous, PRN  glucose chewable tablet 4 each, 4 tablet, Oral, PRN  glucagon (rDNA) injection 1 mg, 1 mg, IntraMUSCular, PRN  dextrose 5 % solution, 100 mL/hr, IntraVENous, PRN  dextrose bolus (hypoglycemia) 10% 125 mL, 125 mL, IntraVENous, PRN **OR** dextrose bolus (hypoglycemia) 10% 250 mL, 250 mL, IntraVENous, PRN    Allergies:  Patient has no known allergies. Social History:   TOBACCO:   reports that he quit smoking about 45 years ago. He smoked 0.50 packs per day. He has never used smokeless tobacco.  ETOH:   reports no history of alcohol use. DRUGS:   reports no history of drug use. Family History:       Problem Relation Age of Onset   Wolfe Cancer Mother     Diabetes Father     Heart Disease Father     High Blood Pressure Father     Diabetes Sister     High Blood Pressure Sister     Cancer Brother         prostate, breast    Diabetes Brother     Cancer Maternal Uncle     Diabetes Maternal Grandmother     Stroke Maternal Grandfather        REVIEW OF SYSTEMS:     CONSTITUTIONAL:  positive for  fatigue  RESPIRATORY:  negative  CARDIOVASCULAR:  negative  GASTROINTESTINAL:  negative  GENITOURINARY:  negative    PHYSICAL EXAM:      VITALS:  BP (!) 104/49   Pulse 71   Temp 98 °F (36.7 °C) (Axillary)   Resp 16   Ht 5' 8\" (1.727 m)   Wt 194 lb (88 kg)   SpO2 99%   BMI 29.50 kg/m²      TEMPERATURE:  Current - Temp: 98 °F (36.7 °C);  Max - Temp  Av °F (36.7 °C)  Min: 98 °F (36.7 °C)  Max: 98 °F (36.7 °C)  24HR BLOOD PRESSURE RANGE:  Systolic (37FQD), MQQ:165 , Min:82 , VKU:985   ; Diastolic (09PHZ), FQT:87, Min:49, Max:78    Physical Exam:  General appearance: alert, appears stated age, cooperative, fatigued, no distress, mildly obese and slowed mentation  Head: Normocephalic, without obvious abnormality, atraumatic  Back: No CVA tenderness  Abdomen: Soft, non-tender, non-distended. 16fr SPt in place, urine clear yellow with some debris noted in tubing.     DATA:    WBC:    Lab Results   Component Value Date    WBC 7.5 04/20/2022     Hemoglobin/Hematocrit:    Lab Results   Component Value Date    HGB 8.3 04/20/2022    HCT 28.0 04/20/2022     BMP:    Lab Results   Component Value Date     04/22/2022    K 4.7 04/22/2022    K 3.9 02/12/2018     04/22/2022    CO2 23 04/22/2022    BUN 32 04/22/2022    LABALBU 3.4 04/18/2022    CREATININE 1.9 04/22/2022    CALCIUM 8.9 04/22/2022    GFRAA 41 04/22/2022    LABGLOM 34 04/22/2022     PT/INR:    Lab Results   Component Value Date    PROTIME 12.5 04/18/2022    PROTIME 15.2 01/24/2011    INR 0.97 04/18/2022     Urine Culture: Susceptibility  Serratia marcescens (2)    Antibiotic Interpretation Microscan Method Status    ampicillin Resistant >16 BACTERIAL SUSCEPTIBILITY PANEL USMAN Final    ampicillin-sulbactam Resistant >16/8 BACTERIAL SUSCEPTIBILITY PANEL USMAN Final    ceFAZolin Resistant >16 BACTERIAL SUSCEPTIBILITY PANEL USMAN Final    cefepime Sensitive <=2 BACTERIAL SUSCEPTIBILITY PANEL USMAN Final    cefuroxime Resistant >16 BACTERIAL SUSCEPTIBILITY PANEL USMAN Final    ciprofloxacin Sensitive <=1 BACTERIAL SUSCEPTIBILITY PANEL USMAN Final    ertapenem Sensitive <=0.5 BACTERIAL SUSCEPTIBILITY PANEL USMAN Final    gentamicin Sensitive <=4 BACTERIAL SUSCEPTIBILITY PANEL USMAN Final    meropenem Sensitive <=1 BACTERIAL SUSCEPTIBILITY PANEL USMAN Final    piperacillin-tazobactam Intermediate 64 BACTERIAL SUSCEPTIBILITY PANEL USMAN Final    trimethoprim-sulfamethoxazole Sensitive <=2/38 BACTERIAL SUSCEPTIBILITY PANEL USMAN Final    amoxicillin-clavulanate (f) Resistant >16/8 BACTERIAL SUSCEPTIBILITY PANEL USMAN Final    nitrofurantoin Resistant >64 BACTERIAL SUSCEPTIBILITY PANEL USMAN Final          Imaging:  Echocardiogram complete 2D with doppler with color    Result Date: 3/29/2022  Transthoracic Echocardiography Report (TTE)  Demographics   Patient Name      Shelley MARCIAL      Date of Study       03/29/2022   Date of Birth     1940          Gender              Male   Age               78 year(s)          Race                Black   Patient Number    [id number]          Room Number         0397   Visit Number      [id number]   Corporate ID      [id number]   Accession Number  [id number]          [facility]   Ordering          Palomo Corbett MD  Interpreting        Thalia Tavarez  Physician                             Physician           Kade ARNDT  Procedure Type of Study   TTE procedure:ECHOCARDIOGRAM COMPLETE 2D W DOPPLER W COLOR. Procedure Date Date: 03/29/2022 Start: 11:13 AM Study Location: Portable Technical Quality: Adequate visualization Indications:Atrial fibrillation. Patient Status: Routine Height: 72 inches Weight: 204 pounds BSA: 2.15 m2 BMI: 27.67 kg/m2 HR: 78 bpm BP: 136/61 mmHg  Conclusions   Summary  Left ventricular systolic function is normal.  Ejection fraction is visually estimated at 55%. Moderate left ventricular hypertrophy. Grade I diastolic dysfunction. Sclerotic, but non-stenotic aortic valve. No evidence of any pericardial effusion.    Signature   ------------------------------------------------------------------  Electronically signed by Jerilyn Barnett MD  (Interpreting physician) on 03/29/2022 at 12:28 PM  ------------------------------------------------------------------   Findings   Left Ventricle Left ventricular systolic function is normal.  Ejection fraction is visually estimated at 55%. Moderate left ventricular hypertrophy. Grade I diastolic dysfunction. Left ventricle size is normal.  No regional wall motion abnormalities. Left Atrium  Essentially normal left atrium. Right Atrium  Essentially normal right atrium. Right Ventricle  Essentially normal right ventricle. Aortic Valve  Sclerotic, but non-stenotic aortic valve. Mitral Valve  Structurally normal mitral valve. Tricuspid Valve  Structurally normal tricuspid valve. Pulmonic Valve  The pulmonic valve was not well visualized. Pericardial Effusion  No evidence of any pericardial effusion. Pleural Effusion  No evidence of pleural effusion. Miscellaneous  Aorta was not clearly visualized.   M-Mode/2D Measurements & Calculations   LV Diastolic Dimension:   LV Systolic Dimension:   LA Dimension: 2.8 cmAO  4.13 cm                   2.99 cm                  Root Dimension: 3.4 cm  LV FS:27.6 %              LV Volume Diastolic: 98  LV PW Diastolic: 1.28 cm  ml  LV PW Systolic: 1.9 cm    LV Volume Systolic: 34  Septum Diastolic: 4.45 cm ml                       RV Diastolic Dimension:  Septum Systolic: 0.76 cm  LV EDV/LV EDV Index: 98  2.85 cm  CO: 6.83 l/min            ml/46 m2LV ESV/LV ESV  CI: 3.18 l/m*m2           Index: 34 ml/16 m2       LA/Aorta: 0.82                            EF Calculated (A4C):  LV Area Diastolic: 25.8   18.6 %  cm2                       EF Calculated (2D): 54 %  LV Area Systolic: 63.9  cm2                       LV Length: 8.38 cm                             LVOT: 2.1 cm  Doppler Measurements & Calculations   MV Peak E-Wave: 63.2    AV Peak Velocity: 105 cm/s   LVOT Peak Velocity: 102  cm/s                    AV Peak Gradient: 4.41 mmHg  cm/s  MV Peak A-Wave: 98.2    AV Mean Velocity: 80.1 cm/s  LVOT Mean Velocity:  cm/s                    AV Mean Gradient: 3 mmHg     79.2 cm/s  MV E/A Ratio: 0.64      AV VTI: 21.6 cm              LVOT Peak Gradient: 4  MV Peak Gradient: 1.6   AV Area (Continuity):4.05    mmHgLVOT Mean Gradient:  mmHg                    cm2                          3 mmHg   MV P1/2t: 54 msec       LVOT VTI: 25.3 cm  MVA by PHT:4.07 cm2   MV E' Septal Velocity:  7.9 cm/s  MV E' Lateral Velocity:  10.4 cm/s  MV E/E' septal: 8  MV E/E' lateral: 6.08      CT Head WO Contrast    Result Date: 4/18/2022  EXAMINATION: CT OF THE HEAD WITHOUT CONTRAST  4/18/2022 11:53 am TECHNIQUE: CT of the head was performed without the administration of intravenous contrast. Dose modulation, iterative reconstruction, and/or weight based adjustment of the mA/kV was utilized to reduce the radiation dose to as low as reasonably achievable. COMPARISON: CT head without contrast 03/25/2020. HISTORY: ORDERING SYSTEM PROVIDED HISTORY: Altered mental status, last documented known well time 4 PM yesterday TECHNOLOGIST PROVIDED HISTORY: Reason for exam:->Altered mental status, last documented known well time 4 PM yesterday Has a \"code stroke\" or \"stroke alert\" been called? ->No Reason for Exam: Altered mental status, last documented known well time 4 PM yesterday FINDINGS: BRAIN/VENTRICLES: No mass effect, edema or hemorrhage is seen. Stable chronic is seen right lateral basal ganglia/external region. Mild-to-moderate volume loss is seen in the brain with moderate chronic microvascular ischemic changes. No hydrocephalus or extra-axial fluid is seen. ORBITS: The visualized portion of the orbits demonstrate no acute abnormality. SINUSES: The visualized paranasal sinuses and mastoid air cells demonstrate no acute abnormality. SOFT TISSUES/SKULL:  No acute abnormality of the visualized skull or soft tissues. No acute intracranial abnormality.  RECOMMENDATIONS: Unavailable     CT HEAD WO CONTRAST    Result Date: 3/25/2022  EXAMINATION: CT OF THE HEAD WITHOUT CONTRAST  3/25/2022 5:14 pm TECHNIQUE: CT of the head was performed without the administration of intravenous contrast. Dose modulation, iterative reconstruction, and/or weight based adjustment of the mA/kV was utilized to reduce the radiation dose to as low as reasonably achievable. COMPARISON: 03/17/2022 HISTORY: ORDERING SYSTEM PROVIDED HISTORY: Lt arm numbness TECHNOLOGIST PROVIDED HISTORY: Reason for exam:->Lt arm numbness Has a \"code stroke\" or \"stroke alert\" been called? ->No Reason for Exam: Lt arm numbness FINDINGS: BRAIN/VENTRICLES: There is no acute intracranial hemorrhage, mass effect or midline shift. No abnormal extra-axial fluid collection. The gray-white differentiation is maintained without evidence of an acute infarct. There is no evidence of hydrocephalus. Stable diffuse parenchymal volume loss with moderate chronic white matter microvascular ischemic changes. 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 ORBITS: The visualized portion of the orbits demonstrate no acute abnormality. SINUSES: The visualized paranasal sinuses and mastoid air cells demonstrate no acute abnormality. SOFT TISSUES/SKULL:  No acute abnormality of the visualized skull or soft tissues. 1. No acute intracranial abnormality. 2. Stable moderate chronic white matter microvascular ischemic changes and chronic lacunar infarct in the right basal ganglia. US HEAD NECK SOFT TISSUE THYROID    Result Date: 3/26/2022  EXAMINATION: THYROID ULTRASOUND 3/26/2022 COMPARISON: None. HISTORY: ORDERING SYSTEM PROVIDED HISTORY: mildly hypothyroid TECHNOLOGIST PROVIDED HISTORY: Reason for exam:->mildly hypothyroid Reason for Exam: hypothyroidism FINDINGS: Right thyroid lobe:  4.5 x 1.4 x 1.8 cm Left thyroid lobe:  3.1 x 1.4 x 1.7 cm Isthmus:  0.6 cm Thyroid Gland:  Background thyroid parenchyma is mildly heterogeneous with normal vascularity. Nodules: No thyroid nodules are present. Cervical lymphadenopathy: No abnormal lymph nodes in the imaged portions of the neck.      Mild changes of chronic thyroiditis. CT ABDOMEN PELVIS W IV CONTRAST Additional Contrast? None    Result Date: 4/18/2022  EXAMINATION: CT OF THE ABDOMEN AND PELVIS WITH CONTRAST 4/18/2022 12:08 pm TECHNIQUE: CT of the abdomen and pelvis was performed with the administration of intravenous contrast. Multiplanar reformatted images are provided for review. Dose modulation, iterative reconstruction, and/or weight based adjustment of the mA/kV was utilized to reduce the radiation dose to as low as reasonably achievable. COMPARISON: 03/20/2022 HISTORY: ORDERING SYSTEM PROVIDED HISTORY: Abdominal distention, Abdominal pain, altered mentation, concern for sepsis TECHNOLOGIST PROVIDED HISTORY: Additional Contrast?->None Reason for exam:->Abdominal distention, Abdominal pain, altered mentation, concern for sepsis Decision Support Exception - unselect if not a suspected or confirmed emergency medical condition->Emergency Medical Condition (MA) Reason for Exam: Abdominal distention, Abdominal pain, altered mentation, concern for sepsis FINDINGS: Lower Chest:  Visualized portion of the lower chest demonstrates no acute abnormality. Organs: Left ureteral stent extends from the renal pelvis to the urinary bladder. Small foci of gas are noted in the left renal collecting system. The liver, spleen, pancreas, gallbladder, adrenal glands, and kidneys demonstrate no acute abnormality. GI/Bowel: No acutely dilated or inflamed loops of bowel. Small hiatal hernia. Normal appendix. Pelvis: Suprapubic catheter is present. Trace amount of pelvic free fluid. No pelvic mass, adenopathy, or fluid collection. Peritoneum/Retroperitoneum: No abdominal aortic aneurysm. No adenopathy. No ascites. No free intraperitoneal air. Bones/Soft Tissues: No acute osseous abnormality. 1. No acute abnormality in the abdomen or pelvis. 2. Left ureteral stent and suprapubic catheter. No hydronephrosis. 3. Trace amount of pelvic free fluid is nonspecific.      XR CHEST PORTABLE    Result Date: 4/18/2022  EXAMINATION: ONE XRAY VIEW OF THE CHEST 4/18/2022 10:08 am COMPARISON: None. HISTORY: ORDERING SYSTEM PROVIDED HISTORY: Altered mental status TECHNOLOGIST PROVIDED HISTORY: Reason for exam:->Altered mental status Reason for Exam: ams FINDINGS: The lungs are clear, no effusion. No pneumothorax. Heart is normal size. Mediastinal and hilar contours are within normal limits. Bony thorax no acute abnormality. 1. No acute cardiopulmonary abnormality. XR CHEST PORTABLE    Result Date: 4/10/2022  EXAMINATION: ONE XRAY VIEW OF THE CHEST 4/10/2022 2:00 pm COMPARISON: Chest x-ray from March 16, 2022 HISTORY: ORDERING SYSTEM PROVIDED HISTORY: SOB TECHNOLOGIST PROVIDED HISTORY: Reason for exam:->SOB Reason for Exam: SOB FINDINGS: The cardiomediastinal silhouette is not enlarged. No pleural effusion or pneumothorax. No focal consolidation. Favor mild soft tissue prominence over the left lung base less likely trace pleural effusion or consolidation. Attention with a lateral view if clinically indicated. No acute cardiopulmonary abnormality. Favor mild soft tissue prominence over the left lung base less likely trace pleural effusion or consolidation. Attention with a follow-up lateral view if clinically indicated. VL DUP LOWER EXTREMITY VENOUS BILATERAL    Result Date: 3/24/2022  EXAMINATION: DUPLEX VENOUS ULTRASOUND OF THE BILATERAL LOWER EXTREMITIES3/23/2022 8:55 pm TECHNIQUE: Duplex ultrasound using B-mode/gray scaled imaging, Doppler spectral analysis and color flow Doppler was obtained of the deep venous structures of the lower bilateral extremities. COMPARISON: None.  HISTORY: ORDERING SYSTEM PROVIDED HISTORY: Bilateral lower extremity pain, swelling, rule out DVT TECHNOLOGIST PROVIDED HISTORY: Reason for exam:->Bilateral lower extremity pain, swelling, rule out DVT Reason for Exam: Pain and swelling FINDINGS: The visualized veins of the bilateral lower extremities are patent and free of echogenic thrombus. The veins demonstrate good compressibility with normal color flow study and spectral analysis. Of note, the right popliteal vein could not be compressed due to patient movement. No evidence of DVT in either lower extremity. CTA HEAD NECK W CONTRAST    Result Date: 4/18/2022  EXAMINATION: CTA OF THE HEAD AND NECK WITH CONTRAST 4/18/2022 12:08 pm: TECHNIQUE: CTA of the head and neck was performed with the administration of intravenous contrast. Multiplanar reformatted images are provided for review. MIP images are provided for review. Stenosis of the internal carotid arteries measured using NASCET criteria. Dose modulation, iterative reconstruction, and/or weight based adjustment of the mA/kV was utilized to reduce the radiation dose to as low as reasonably achievable. COMPARISON: Concurrent head CT HISTORY: ORDERING SYSTEM PROVIDED HISTORY: Altered mental status, last documented known well time 4 PM yesterday FINDINGS: CTA NECK: AORTIC ARCH/ARCH VESSELS: No dissection or arterial injury. No significant stenosis of the brachiocephalic or subclavian arteries. CAROTID ARTERIES: No dissection, arterial injury, or hemodynamically significant stenosis by NASCET criteria. There is retropharyngeal course of the bilateral carotid arteries. There is circumferential wall thickening throughout the bilateral common carotid arteries without significant stenosis. VERTEBRAL ARTERIES: Severe stenosis involving origin of the left cervical vertebral artery. Moderate stenosis involving origin of the right cervical vertebral artery. SOFT TISSUES: The lung apices are clear. No cervical or superior mediastinal lymphadenopathy. The larynx and pharynx are unremarkable. No acute abnormality of the salivary and thyroid glands. There is mild circumferential wall thickening of the thoracic esophagus which impresses the dorsal aspect of the trachea, unchanged.  BONES: No acute osseous abnormality. Extensive postsurgical changes throughout the cervical spine with posterior fusion instrumentation and decompressive laminectomies at C3 through C7 CTA HEAD: ANTERIOR CIRCULATION: Scattered vascular calcifications throughout the carotid siphons with moderate to severe narrowing of the right carotid terminus. There is focal high-grade stenosis involving the proximal left M2 segment with improved caliber distally. There is focal high-grade stenosis involving the proximal right A2 segment. POSTERIOR CIRCULATION: There is high-grade stenosis involving the mid to distal left V4 segment. The right intracranial vertebral artery and basilar artery are patent without high-grade stenosis or occlusion. Multi luminal irregularity throughout the proximal PCAs with high-grade stenosis involving the mid to distal right P2 segment. 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 OTHER: No dural venous sinus thrombosis on this non-dedicated study. BRAIN: No mass effect or midline shift. No extra-axial fluid collection. The gray-white differentiation is maintained. 1. No significant stenosis within the cervical ICAs per NASCET criteria. 2. Mild circumferential wall thickening throughout the bilateral common carotid arteries with retropharyngeal course. 3. Severe stenosis involving origin of the left cervical vertebral artery, moderate stenosis involving origin of the right cervical vertebral artery. 4. Extensive intracranial atherosclerosis resulting in multifocal areas of moderate to high-grade stenosis, as above. No large vessel occlusion. 5. 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 Assessment & Plan:      Ely Mahoney is a 78y.o. year old male admitted 2/54/8494 for metabolic encephalopathy. 1) Chronic Urinary Retention: managed with suprapubic catheter and exchanged monthly, last exchanged 4/13/22.              Recommend SPT exchanges q3 weeks.    Pt to follow up in our office in 10-14 days for SPT exchange. 2) Left Hydronephrosis: s/p cystoscopy, left ureteral stent exchange 3/17/22              S/p cystoscopy, left ureteral stent placement 7/4/21              CT a/p 4/18/22: Well-positioned left ureteral stent with no evidence of hydronephrosis. Normal right kidney. Well-positioned suprapubic catheter in the urinary bladder.              The pt will need q3 month left ureteral stent exchanges  3) Complicated UTI: urine cx +Serratia marcescens >100K CFU/ml   WBC 7.5, afebrile   On IV Cefepime; ID following  4) H/o Prostate Cancer: S/p RPP with Dr. Oscar Kumar in [address] [place] [address] PSA 0.93 2/2021. On Eligard q6 months    Pt stable from a  standpoint. Will sign off, please call with any questions. Pt to follow up in our office in 10-14 days for SPT exchange. Patient seen and examined, chart reviewed.      Electronically signed by Nohemi Toledo PA-C on 4/22/2022 at 8:17 AM

## 2022-04-22 NOTE — PLAN OF CARE
Problem: Skin Integrity:  Goal: Will show no infection signs and symptoms  Description: Will show no infection signs and symptoms  Outcome: Progressing  Goal: Absence of new skin breakdown  Description: Absence of new skin breakdown  Outcome: Progressing     Problem: Nutritional:  Goal: Ability to tolerate tube feedings without aspirating will improve  Description: Ability to tolerate tube feedings without aspirating will improve  Outcome: Progressing  Goal: Consumption of food in small portions  Description: Consumption of food in small portions  Outcome: Progressing  Goal: Consumption of liquid of appropriate consistency  Description: Consumption of liquid of appropriate consistency  Outcome: Progressing     Problem: Respiratory:  Goal: Ability to maintain a clear airway will improve  Description: Ability to maintain a clear airway will improve  Outcome: Progressing  Goal: Will remain free from infection  Description: Will remain free from infection  Outcome: Progressing  Goal: Absence of aspiration  Description: Absence of aspiration  Outcome: Progressing     Problem: Safety:  Goal: Ability to chew and swallow food without choking will improve  Description: Ability to chew and swallow food without choking will improve  Outcome: Progressing  Goal: Ability to demonstrate good, daily oral hygiene techniques will improve  Description: Ability to demonstrate good, daily oral hygiene techniques will improve  Outcome: Progressing  Goal: Maintenance of upright position during and after feeding  Description: Maintenance of upright position during and after feeding  Outcome: Progressing     Problem: Falls - Risk of:  Goal: Will remain free from falls  Description: Will remain free from falls  Outcome: Progressing  Goal: Absence of physical injury  Description: Absence of physical injury  Outcome: Progressing     Problem: Discharge Planning  Goal: Discharge to home or other facility with appropriate resources  Outcome: Progressing     Problem: Pain  Goal: Verbalizes/displays adequate comfort level or baseline comfort level  Outcome: Progressing     Problem: Chronic Conditions and Co-morbidities  Goal: Patient's chronic conditions and co-morbidity symptoms are monitored and maintained or improved  Outcome: Progressing

## 2022-04-22 NOTE — DISCHARGE SUMMARY
Hospital Discharge Summary    Patient:  Jasmine Ponce  YOB: 1940    MRN: [id number]   Acct: [de-identified]    Primary Care Physician: Pacheco Smith MD    Admit date:  4/18/2022    Discharge date:  04/22/2022      Discharge Diagnoses:   Acute metabolic encephalopathy  Principal Problem:    Acute metabolic encephalopathy  Active Problems:    Complicated UTI (urinary tract infection)    Serratia marcescens infection    Hypoglycemia    Generalized weakness    Type 2 diabetes mellitus with hypoglycemia without coma ([address]    Acute kidney failure ([address]    Prostate cancer ([facility]    Suprapubic catheter ([facility]  Resolved Problems:    * No resolved hospital problems. *      Admitted for: (HPI): Metabolic encephalopathy  Jasmine Ponce is a 78 y.o.  male  who with a history of diabetes, essential hypertension, recurrent urinary tract infection, presents with alteration in mental status at the [facility] home where he resides. Per nursing staff, patient was last known well at 4 PM yesterday. This morning, patient was difficult to arouse. There is no reported falls. His blood pressure was in the 80s and his blood sugar was in the 50s. Patient was fed a turkey sandwich in the emergency room with good return of blood sugars. Patient himself is unable to give a history due to dementia, but denies any new complaints does however appear to have some abdominal tenderness     Hospital Course:  Andreas Easton is a 78 y. o. male who presents with acute Metabolic encephalopathy in the setting of acute uti and hypoglycemia. He was admitted and treated as follows:     #Metabolic encephalopathy likely related to complicated UTI and hypoglycemia. His CT scan of his head was negative for any acute findings. CTA head and neck with no stenosis within the cervical ICAs. CXR neg. Ultram was DC'd  Was admitted and started on IV antibiotics   His Lantus and Amaryl were held he was started on D5 normal saline .   He quickly improved   His back to baseline. Continue treating infection. He is eating and tolerating  Medically stable for discharge and will be discharged with Levemir 10 units daily and medium sliding scale . This can be uptitrated as needed based on his blood sugars. Lantus 40 units daily and Amaryl 4 mg in a.m. we DC'd. SNF MD to follow    # Complicated Urinary tract infection:  Serratia marcescens UTI, from Chronic Urinary Retention  Suprapubic catheter, Possible catheter associated UTI:   Pt with suprapubic mccrary. Has left ureteral stent on imaging. Urine culture with serratia marcesens sensitive to meropenem and cefepime. Procal 6.27. Was initially treated with Rocephin and and then cefepime. ID followed and recommends to transition to Bactrim DS twice daily until 5/4/2022. ID will follow with him as outpatient in 2 weeks  They recommend weekly labs while on antibiotics-see MYRIAM  He was also seen by urology and they noted he has history of left Hydronephrosis: s/p cystoscopy,s/p stent placement 7/4/21,s/p left ureteral stent exchange 3/17/22. States patient will need every 3 months left ureteral stent exchanges. They cleared him for discharge with outpatient follow-up in 2 weeks. CRP and procalcitonin trending down     # Uncontrolled diabetes, with hypoglycemia, likely related to poor oral intake. Eating well now. D/ismael D5.    DC'd Amaryl and Lantus   Continue Levemir and medium sliding scale . SNF MD to follow and adjust insulin as needed . Will recommend not restarting patient on Amaryl given CKD as this has increased risk of hypoglycemia .     #Acute kidney injury on ckd 3: Secondary to dehydration. Improved with hydration. Creatinine down to 1.9 at discharge.     # Hyperkalemia: Resolved with Lokelma. Potassium 4.7. Lisinopril DC'd.     # Hyponatremia: Mild. Sec to iv fluids but has been d/ismael. Resolved.     # Anemia: Sec to CKD. Monitor.     #Elevated trop: In the setting of CKD and infection. No chestpain or EKG changes. Echo from last month with EF 55%. Norml LV function. Monitor.     #Hypotension secondary to dehydration but resolved. .BP now high. Restarted Norvasc. Hold lisinopril sec renal failure. Restart Coreg. Monitor    # History of prostate cancer. He will continue to follow with urology     Disposition; SNF. Medically stable for discharge. Surrogate decision maker: Son        Consultants: Infectious disease and urology    Discharge Medications:       Medication List      START taking these medications    sulfamethoxazole-trimethoprim 800-160 MG per tablet  Commonly known as: BACTRIM DS;SEPTRA DS  Take 1 tablet by mouth 2 times daily for 12 days        CONTINUE taking these medications    acetaminophen 325 MG tablet  Commonly known as: Tylenol  Take 2 tablets by mouth every 6 hours as needed for Pain     allopurinol 100 MG tablet  Commonly known as: ZYLOPRIM     amLODIPine 10 MG tablet  Commonly known as: NORVASC     aspirin 81 MG chewable tablet  Take 1 tablet by mouth daily     atorvastatin 40 MG tablet  Commonly known as: LIPITOR  Take 1 tablet by mouth at bedtime     bisacodyl 10 MG suppository  Commonly known as: DULCOLAX     carvedilol 3.125 MG tablet  Commonly known as: COREG  Take 1 tablet by mouth 2 times daily (with meals)     docusate 100 MG Caps  Commonly known as: COLACE, DULCOLAX  Take 100 mg by mouth 2 times daily as needed for Constipation.      glucose 40 % Gel  Commonly known as: GLUTOSE     insulin lispro 100 UNIT/ML injection vial  Commonly known as: HUMALOG     ipratropium-albuterol 0.5-2.5 (3) MG/3ML Soln nebulizer solution  Commonly known as: DUONEB     LEVEMIR FLEXPEN SC     levothyroxine 50 MCG tablet  Commonly known as: SYNTHROID  Take 1 tablet by mouth Daily     lidocaine 5 %  Commonly known as: LIDODERM     magnesium hydroxide 400 MG/5ML suspension  Commonly known as: MILK OF MAGNESIA     oxybutynin 10 MG extended release tablet  Commonly known as: DITROPAN-XL     polyethylene glycol 17 g packet  Commonly known as: GLYCOLAX     pregabalin 50 MG capsule  Commonly known as: LYRICA  Take 1 capsule by mouth 2 times daily for 30 days. sodium bicarbonate 650 MG tablet  Take 1 tablet by mouth 3 times daily        STOP taking these medications    glimepiride 4 MG tablet  Commonly known as: AMARYL     insulin glargine 100 UNIT/ML injection vial  Commonly known as: LANTUS     lisinopril 5 MG tablet  Commonly known as: PRINIVIL;ZESTRIL     traMADol 50 MG tablet  Commonly known as: ULTRAM           Where to Get Your Medications      Information about where to get these medications is not yet available    Ask your nurse or doctor about these medications  · aspirin 81 MG chewable tablet  · sulfamethoxazole-trimethoprim 800-160 MG per tablet       Physical Exam:    Vitals:  Vitals:    04/22/22 0600 04/22/22 0756 04/22/22 0758 04/22/22 0953   BP: (!) 116/54 (!) 104/49  (!) 120/59   Pulse: 75 69 71 81   Resp:  16     Temp:  98 °F (36.7 °C)     TempSrc:  Axillary     SpO2:  99%  100%   Weight:       Height:         Weight: Weight: 194 lb (88 kg)     24 hour intake/output:    Intake/Output Summary (Last 24 hours) at 4/22/2022 1713  Last data filed at 4/22/2022 0000  Gross per 24 hour   Intake 10 ml   Output 750 ml   Net -740 ml     General appearance - alert, well appearing, and in no distress  HEENT: Normocephalic, Atraumatic, Conjuctiva pink, PERRL, Oral mucosa normal, Lips, teeth and gums normal, Trachea midline, Thyroid normal and No noted lymphadenopathy  Chest - clear to auscultation, no wheezes, rales or rhonchi, symmetric air entry  Cardiovascular - normal rate, regular rhythm, normal S1, S2, no murmurs, rubs, clicks or gallops  Abdomen - soft, nontender, nondistended, no masses or organomegaly  Suprapubic mccrary.    Neurological - Alert and oriented, Normal speech, No focal findings or movement disorder noted and Motor and sensory grossly normal bilaterally  Integumentary - Skin color, texture, turgor normal. No Rashes or lesions  Musculoskeletal -Full ROM times 4 extremities, No clubbing or cyanosis and No peripheral edema    Procedures:      Diagnostic Test:     Radiology reports as per the Radiologist  Radiology:   CT Head WO Contrast  Result Date: 4/18/2022  EXAMINATION: CT OF THE HEAD WITHOUT CONTRAST  4/18/2022 11:53 am TECHNIQUE: CT of the head was performed without the administration of intravenous contrast. Dose modulation, iterative reconstruction, and/or weight based adjustment of the mA/kV was utilized to reduce the radiation dose to as low as reasonably achievable. COMPARISON: CT head without contrast 03/25/2020. HISTORY: ORDERING SYSTEM PROVIDED HISTORY: Altered mental status, last documented known well time 4 PM yesterday TECHNOLOGIST PROVIDED HISTORY: Reason for exam:->Altered mental status, last documented known well time 4 PM yesterday Has a \"code stroke\" or \"stroke alert\" been called? ->No Reason for Exam: Altered mental status, last documented known well time 4 PM yesterday FINDINGS: BRAIN/VENTRICLES: No mass effect, edema or hemorrhage is seen. Stable chronic is seen right lateral basal ganglia/external region. Mild-to-moderate volume loss is seen in the brain with moderate chronic microvascular ischemic changes. No hydrocephalus or extra-axial fluid is seen. ORBITS: The visualized portion of the orbits demonstrate no acute abnormality. SINUSES: The visualized paranasal sinuses and mastoid air cells demonstrate no acute abnormality. SOFT TISSUES/SKULL:  No acute abnormality of the visualized skull or soft tissues. No acute intracranial abnormality.  RECOMMENDATIONS: Unavailable     CT ABDOMEN PELVIS W IV CONTRAST Additional Contrast? None  Result Date: 4/18/2022  EXAMINATION: CT OF THE ABDOMEN AND PELVIS WITH CONTRAST 4/18/2022 12:08 pm TECHNIQUE: CT of the abdomen and pelvis was performed with the administration of intravenous contrast. Multiplanar reformatted images are provided for review. Dose modulation, iterative reconstruction, and/or weight based adjustment of the mA/kV was utilized to reduce the radiation dose to as low as reasonably achievable. COMPARISON: 03/20/2022 HISTORY: ORDERING SYSTEM PROVIDED HISTORY: Abdominal distention, Abdominal pain, altered mentation, concern for sepsis TECHNOLOGIST PROVIDED HISTORY: Additional Contrast?->None Reason for exam:->Abdominal distention, Abdominal pain, altered mentation, concern for sepsis Decision Support Exception - unselect if not a suspected or confirmed emergency medical condition->Emergency Medical Condition (MA) Reason for Exam: Abdominal distention, Abdominal pain, altered mentation, concern for sepsis FINDINGS: Lower Chest:  Visualized portion of the lower chest demonstrates no acute abnormality. Organs: Left ureteral stent extends from the renal pelvis to the urinary bladder. Small foci of gas are noted in the left renal collecting system. The liver, spleen, pancreas, gallbladder, adrenal glands, and kidneys demonstrate no acute abnormality. GI/Bowel: No acutely dilated or inflamed loops of bowel. Small hiatal hernia. Normal appendix. Pelvis: Suprapubic catheter is present. Trace amount of pelvic free fluid. No pelvic mass, adenopathy, or fluid collection. Peritoneum/Retroperitoneum: No abdominal aortic aneurysm. No adenopathy. No ascites. No free intraperitoneal air. Bones/Soft Tissues: No acute osseous abnormality. 1. No acute abnormality in the abdomen or pelvis. 2. Left ureteral stent and suprapubic catheter. No hydronephrosis. 3. Trace amount of pelvic free fluid is nonspecific. XR CHEST PORTABLE  Result Date: 4/18/2022  EXAMINATION: ONE XRAY VIEW OF THE CHEST 4/18/2022 10:08 am COMPARISON: None. HISTORY: ORDERING SYSTEM PROVIDED HISTORY: Altered mental status TECHNOLOGIST PROVIDED HISTORY: Reason for exam:->Altered mental status Reason for Exam: ams FINDINGS: The lungs are clear, no effusion. No pneumothorax. Heart is normal size. Mediastinal and hilar contours are within normal limits. Bony thorax no acute abnormality. 1. No acute cardiopulmonary abnormality. CTA HEAD NECK W CONTRAST  Result Date: 4/18/2022  EXAMINATION: CTA OF THE HEAD AND NECK WITH CONTRAST 4/18/2022 12:08 pm: TECHNIQUE: CTA of the head and neck was performed with the administration of intravenous contrast. Multiplanar reformatted images are provided for review. MIP images are provided for review. Stenosis of the internal carotid arteries measured using NASCET criteria. Dose modulation, iterative reconstruction, and/or weight based adjustment of the mA/kV was utilized to reduce the radiation dose to as low as reasonably achievable. COMPARISON: Concurrent head CT HISTORY: ORDERING SYSTEM PROVIDED HISTORY: Altered mental status, last documented known well time 4 PM yesterday FINDINGS: CTA NECK: AORTIC ARCH/ARCH VESSELS: No dissection or arterial injury. No significant stenosis of the brachiocephalic or subclavian arteries. CAROTID ARTERIES: No dissection, arterial injury, or hemodynamically significant stenosis by NASCET criteria. There is retropharyngeal course of the bilateral carotid arteries. There is circumferential wall thickening throughout the bilateral common carotid arteries without significant stenosis. VERTEBRAL ARTERIES: Severe stenosis involving origin of the left cervical vertebral artery. Moderate stenosis involving origin of the right cervical vertebral artery. SOFT TISSUES: The lung apices are clear. No cervical or superior mediastinal lymphadenopathy. The larynx and pharynx are unremarkable. No acute abnormality of the salivary and thyroid glands. There is mild circumferential wall thickening of the thoracic esophagus which impresses the dorsal aspect of the trachea, unchanged. BONES: No acute osseous abnormality.   Extensive postsurgical changes throughout the cervical spine with posterior fusion instrumentation and decompressive laminectomies at C3 through C7 CTA HEAD: ANTERIOR CIRCULATION: Scattered vascular calcifications throughout the carotid siphons with moderate to severe narrowing of the right carotid terminus. There is focal high-grade stenosis involving the proximal left M2 segment with improved caliber distally. There is focal high-grade stenosis involving the proximal right A2 segment. POSTERIOR CIRCULATION: There is high-grade stenosis involving the mid to distal left V4 segment. The right intracranial vertebral artery and basilar artery are patent without high-grade stenosis or occlusion. Multi luminal irregularity throughout the proximal PCAs with high-grade stenosis involving the mid to distal right P2 segment. 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 OTHER: No dural venous sinus thrombosis on this non-dedicated study. BRAIN: No mass effect or midline shift. No extra-axial fluid collection. The gray-white differentiation is maintained. 1. No significant stenosis within the cervical ICAs per NASCET criteria. 2. Mild circumferential wall thickening throughout the bilateral common carotid arteries with retropharyngeal course. 3. Severe stenosis involving origin of the left cervical vertebral artery, moderate stenosis involving origin of the right cervical vertebral artery. 4. Extensive intracranial atherosclerosis resulting in multifocal areas of moderate to high-grade stenosis, as above. No large vessel occlusion. 5. 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         Results for orders placed or performed during the hospital encounter of 04/18/22   COVID-19, Rapid    Specimen: Nasopharyngeal   Result Value Ref Range    Source UNKNOWN     SARS-CoV-2, NAAT NOT DETECTED NOT DETECTED   Culture, Urine    Specimen: Urine, clean catch   Result Value Ref Range    Specimen URINE CLEAN CATCH     Special Requests NONE     Culture Final Report     Culture SERRATIA MARCESCENS >100,000 CFU/ml (A)        Susceptibility    Serratia marcescens - BACTERIAL SUSCEPTIBILITY PANEL USMAN     ampicillin >16 Resistant      ampicillin-sulbactam >16/8 Resistant      ceFAZolin >16 Resistant      cefepime <=2 Sensitive      cefuroxime >16 Resistant      ciprofloxacin <=1 Sensitive      ertapenem <=0.5 Sensitive      gentamicin <=4 Sensitive      meropenem <=1 Sensitive      piperacillin-tazobactam 64 Intermediate      trimethoprim-sulfamethoxazole <=2/38 Sensitive      amoxicillin-clavulanate (f) >16/8 Resistant      nitrofurantoin >64 Resistant    CBC with Auto Differential   Result Value Ref Range    WBC 9.9 4.0 - 10.5 K/CU MM    RBC 2.97 (L) 4.6 - 6.2 M/CU MM    Hemoglobin 8.8 (L) 13.5 - 18.0 GM/DL    Hematocrit 30.3 (L) 42 - 52 %    .0 (H) 78 - 100 FL    MCH 29.6 27 - 31 PG    MCHC 29.0 (L) 32.0 - 36.0 %    RDW 17.0 (H) 11.7 - 14.9 %    Platelets 755 425 - 015 K/CU MM    MPV 12.3 (H) 7.5 - 11.1 FL    Differential Type AUTOMATED DIFFERENTIAL     Segs Relative 65.6 36 - 66 %    Lymphocytes % 21.2 (L) 24 - 44 %    Monocytes % 7.4 (H) 0 - 4 %    Eosinophils % 5.0 (H) 0 - 3 %    Basophils % 0.4 0 - 1 %    Segs Absolute 6.5 K/CU MM    Lymphocytes Absolute 2.1 K/CU MM    Monocytes Absolute 0.7 K/CU MM    Eosinophils Absolute 0.5 K/CU MM    Basophils Absolute 0.0 K/CU MM    Nucleated RBC % 0.0 %    Total Nucleated RBC 0.0 K/CU MM    Total Immature Neutrophil 0.04 K/CU MM    Immature Neutrophil % 0.4 0 - 0.43 %   Comprehensive Metabolic Panel w/ Reflex to MG   Result Value Ref Range    Sodium 146 (H) 135 - 145 MMOL/L    Potassium 5.3 (H) 3.5 - 5.1 MMOL/L    Chloride 110 99 - 110 mMol/L    CO2 26 21 - 32 MMOL/L    BUN 53 (H) 6 - 23 MG/DL    CREATININE 2.4 (H) 0.9 - 1.3 MG/DL    Glucose 80 70 - 99 MG/DL    Calcium 9.5 8.3 - 10.6 MG/DL    Albumin 3.4 3.4 - 5.0 GM/DL    Total Protein 7.2 6.4 - 8.2 GM/DL    Total Bilirubin 0.2 0.0 - 1.0 MG/DL    ALT 30 10 - 40 U/L    AST 36 15 - 37 IU/L    Alkaline Phosphatase 98 40 - 129 IU/L    GFR Non- 26 (L) >60 mL/min/1.73m2    GFR  31 (L) >60 mL/min/1.73m2    Anion Gap 10 4 - 16   Troponin   Result Value Ref Range    Troponin T 0.040 (H) <0.01 NG/ML   Brain Natriuretic Peptide   Result Value Ref Range    Pro-.2 (H) <300 PG/ML   Protime-INR   Result Value Ref Range    Protime 12.5 11.7 - 14.5 SECONDS    INR 0.97 INDEX   APTT   Result Value Ref Range    aPTT 33.6 25.1 - 37.1 SECONDS   Lactic Acid   Result Value Ref Range    Lactate 1.1 0.4 - 2.0 mMOL/L   Urinalysis with Reflex to Culture    Specimen: Urine   Result Value Ref Range    Color, UA YELLOW (A) YELLOW    Clarity, UA CLOUDY (A) CLEAR    Glucose, Urine NEGATIVE NEGATIVE MG/DL    Bilirubin Urine NEGATIVE NEGATIVE MG/DL    Ketones, Urine NEGATIVE NEGATIVE MG/DL    Specific Gravity, UA 1.025 1.001 - 1.035    Blood, Urine MODERATE (A) NEGATIVE    pH, Urine 5.5 5.0 - 8.0    Protein, UA 30 (A) NEGATIVE MG/DL    Urobilinogen, Urine 0.2 0.2 - 1.0 MG/DL    Nitrite Urine, Quantitative NEGATIVE NEGATIVE    Leukocyte Esterase, Urine MODERATE (A) NEGATIVE    RBC, UA 36 (H) 0 - 3 /HPF    WBC, UA 1142 (H) 0 - 2 /HPF    Bacteria, UA NEGATIVE NEGATIVE /HPF    WBC Clumps, UA MANY /HPF    Squam Epithel, UA 2 /HPF    Trichomonas, UA NONE SEEN NONE SEEN /HPF   Basic Metabolic Panel   Result Value Ref Range    Sodium 132 (L) 135 - 145 MMOL/L    Potassium 4.9 3.5 - 5.1 MMOL/L    Chloride 101 99 - 110 mMol/L    CO2 20 (L) 21 - 32 MMOL/L    Anion Gap 11 4 - 16    BUN 36 (H) 6 - 23 MG/DL    CREATININE 1.8 (H) 0.9 - 1.3 MG/DL    Glucose 197 (H) 70 - 99 MG/DL    Calcium 8.4 8.3 - 10.6 MG/DL    GFR Non- 36 (L) >60 mL/min/1.73m2    GFR  44 (L) >60 mL/min/1.73m2   Procalcitonin   Result Value Ref Range    Procalcitonin 6.27    Troponin   Result Value Ref Range    Troponin T 0.030 (H) <0.01 NG/ML   Ammonia   Result Value Ref Range    Ammonia 42 16 - 60 UMOL/L   CBC with Auto Differential   Result Value Ref Range    WBC 7.5 4.0 - 10.5 K/CU MM    RBC 2.84 (L) 4.6 - 6.2 M/CU MM    Hemoglobin 8.3 (L) 13.5 - 18.0 GM/DL    Hematocrit 28.0 (L) 42 - 52 %    MCV 98.6 78 - 100 FL    MCH 29.2 27 - 31 PG    MCHC 29.6 (L) 32.0 - 36.0 %    RDW 16.3 (H) 11.7 - 14.9 %    Platelets 896 696 - 121 K/CU MM    MPV 12.0 (H) 7.5 - 11.1 FL    Differential Type AUTOMATED DIFFERENTIAL     Segs Relative 58.1 36 - 66 %    Lymphocytes % 22.8 (L) 24 - 44 %    Monocytes % 10.1 (H) 0 - 4 %    Eosinophils % 8.3 (H) 0 - 3 %    Basophils % 0.4 0 - 1 %    Segs Absolute 4.4 K/CU MM    Lymphocytes Absolute 1.7 K/CU MM    Monocytes Absolute 0.8 K/CU MM    Eosinophils Absolute 0.6 K/CU MM    Basophils Absolute 0.0 K/CU MM    Nucleated RBC % 0.0 %    Total Nucleated RBC 0.0 K/CU MM    Total Immature Neutrophil 0.02 K/CU MM    Immature Neutrophil % 0.3 0 - 0.43 %   Basic Metabolic Panel   Result Value Ref Range    Sodium 133 (L) 135 - 145 MMOL/L    Potassium 5.3 (H) 3.5 - 5.1 MMOL/L    Chloride 101 99 - 110 mMol/L    CO2 22 21 - 32 MMOL/L    Anion Gap 10 4 - 16    BUN 35 (H) 6 - 23 MG/DL    CREATININE 1.8 (H) 0.9 - 1.3 MG/DL    Glucose 171 (H) 70 - 99 MG/DL    Calcium 8.6 8.3 - 10.6 MG/DL    GFR Non- 36 (L) >60 mL/min/1.73m2    GFR  44 (L) >60 BL/QKH/6.13E4   Basic Metabolic Panel   Result Value Ref Range    Sodium 137 135 - 145 MMOL/L    Potassium 4.4 3.5 - 5.1 MMOL/L    Chloride 101 99 - 110 mMol/L    CO2 25 21 - 32 MMOL/L    Anion Gap 11 4 - 16    BUN 31 (H) 6 - 23 MG/DL    CREATININE 1.7 (H) 0.9 - 1.3 MG/DL    Glucose 131 (H) 70 - 99 MG/DL    Calcium 8.7 8.3 - 10.6 MG/DL    GFR Non- 39 (L) >60 mL/min/1.73m2    GFR  47 (L) >60 GC/ADD/0.80H3   Basic Metabolic Panel   Result Value Ref Range    Sodium 138 135 - 145 MMOL/L    Potassium 4.7 3.5 - 5.1 MMOL/L    Chloride 104 99 - 110 mMol/L    CO2 23 21 - 32 MMOL/L    Anion Gap 11 4 - 16    BUN 32 (H) 6 - 23 MG/DL CREATININE 1.9 (H) 0.9 - 1.3 MG/DL    Glucose 190 (H) 70 - 99 MG/DL    Calcium 8.9 8.3 - 10.6 MG/DL    GFR Non- 34 (L) >60 mL/min/1.73m2    GFR  41 (L) >60 mL/min/1.73m2   C-Reactive Protein   Result Value Ref Range    CRP, High Sensitivity 19.3 mg/L   Procalcitonin   Result Value Ref Range    Procalcitonin 3.13    Lactic Acid   Result Value Ref Range    Lactate 0.9 0.4 - 2.0 mMOL/L   POCT Glucose   Result Value Ref Range    POC Glucose 53 (L) 70 - 99 MG/DL   POCT Glucose   Result Value Ref Range    POC Glucose 69 (L) 70 - 99 MG/DL   POCT Glucose   Result Value Ref Range    POC Glucose 85 70 - 99 MG/DL   POCT Glucose   Result Value Ref Range    POC Glucose 128 (H) 70 - 99 MG/DL   POCT Glucose   Result Value Ref Range    POC Glucose 186 (H) 70 - 99 MG/DL   POCT Glucose   Result Value Ref Range    POC Glucose 289 (H) 70 - 99 MG/DL   POCT Glucose   Result Value Ref Range    POC Glucose 262 (H) 70 - 99 MG/DL   POCT Glucose   Result Value Ref Range    POC Glucose 150 (H) 70 - 99 MG/DL   POCT Glucose   Result Value Ref Range    POC Glucose 138 (H) 70 - 99 MG/DL   POCT Glucose   Result Value Ref Range    POC Glucose 191 (H) 70 - 99 MG/DL   POCT Glucose   Result Value Ref Range    POC Glucose 134 (H) 70 - 99 MG/DL   POCT Glucose   Result Value Ref Range    POC Glucose 142 (H) 70 - 99 MG/DL   POCT Glucose   Result Value Ref Range    POC Glucose 162 (H) 70 - 99 MG/DL   POCT Glucose   Result Value Ref Range    POC Glucose 111 (H) 70 - 99 MG/DL   POCT Glucose   Result Value Ref Range    POC Glucose 141 (H) 70 - 99 MG/DL   POCT Glucose   Result Value Ref Range    POC Glucose 111 (H) 70 - 99 MG/DL   POCT Glucose   Result Value Ref Range    POC Glucose 122 (H) 70 - 99 MG/DL   POCT Glucose   Result Value Ref Range    POC Glucose 144 (H) 70 - 99 MG/DL   POCT Glucose   Result Value Ref Range    POC Glucose 179 (H) 70 - 99 MG/DL   POCT Glucose   Result Value Ref Range    POC Glucose 176 (H) 70 - 99 MG/DL   POCT Glucose   Result Value Ref Range    POC Glucose 197 (H) 70 - 99 MG/DL   POCT Glucose   Result Value Ref Range    POC Glucose 155 (H) 70 - 99 MG/DL   POCT Glucose   Result Value Ref Range    POC Glucose 138 (H) 70 - 99 MG/DL   POCT Glucose   Result Value Ref Range    POC Glucose 123 (H) 70 - 99 MG/DL   POCT Glucose   Result Value Ref Range    POC Glucose 170 (H) 70 - 99 MG/DL   POCT Glucose   Result Value Ref Range    POC Glucose 149 (H) 70 - 99 MG/DL   POCT Glucose   Result Value Ref Range    POC Glucose 174 (H) 70 - 99 MG/DL   POCT Glucose   Result Value Ref Range    POC Glucose 185 (H) 70 - 99 MG/DL   POCT Glucose   Result Value Ref Range    POC Glucose 151 (H) 70 - 99 MG/DL   POCT Glucose   Result Value Ref Range    POC Glucose 135 (H) 70 - 99 MG/DL   POCT Glucose   Result Value Ref Range    POC Glucose 204 (H) 70 - 99 MG/DL   POCT Glucose   Result Value Ref Range    POC Glucose 137 (H) 70 - 99 MG/DL   POCT Glucose   Result Value Ref Range    POC Glucose 20 (L) 70 - 99 MG/DL   POCT Glucose   Result Value Ref Range    POC Glucose 146 (H) 70 - 99 MG/DL   POCT Glucose   Result Value Ref Range    POC Glucose 237 (H) 70 - 99 MG/DL   POCT Glucose   Result Value Ref Range    POC Glucose 171 (H) 70 - 99 MG/DL   POCT Glucose   Result Value Ref Range    POC Glucose 223 (H) 70 - 99 MG/DL   POCT Glucose   Result Value Ref Range    POC Glucose 122 (H) 70 - 99 MG/DL   POCT Glucose   Result Value Ref Range    POC Glucose 172 (H) 70 - 99 MG/DL   POCT Glucose   Result Value Ref Range    POC Glucose 194 (H) 70 - 99 MG/DL   POCT Glucose   Result Value Ref Range    POC Glucose 16 (L) 70 - 99 MG/DL   POCT Glucose   Result Value Ref Range    POC Glucose 296 (H) 70 - 99 MG/DL   EKG 12 Lead   Result Value Ref Range    Ventricular Rate 73 BPM    Atrial Rate 73 BPM    P-R Interval 222 ms    QRS Duration 72 ms    Q-T Interval 382 ms    QTc Calculation (Bazett) 420 ms    P Axis 80 degrees    R Axis -13 degrees    T Axis 18 degrees    Diagnosis       Sinus rhythm with 1st degree AV block  Inferior infarct , age undetermined  Abnormal ECG  When compared with ECG of 18-APR-2022 09:55,  Sinus rhythm has replaced Atrial fibrillation  Confirmed by Edilma Crooks ([id number]) on 4/19/2022 2:43:40 PM     EKG 12 Lead   Result Value Ref Range    Ventricular Rate 79 BPM    Atrial Rate 500 BPM    QRS Duration 60 ms    Q-T Interval 374 ms    QTc Calculation (Bazett) 428 ms    R Axis -6 degrees    T Axis 37 degrees    Diagnosis       SR with artifacts   Confirmed by Edilma Crooks ([id number]) on 4/21/2022 6:34:22 PM         Diet:  ADULT DIET;  Regular; 4 carb choices (60 gm/meal)    Activity:  Up ad tj (Patient can move with assistance)    Follow-up:  in 2-3 weeks with Jose Ramon Salazar MD,     Disposition: SNF    Condition: Stable      Time Spent: 40 minutes    Electronically signed by Glenis Watson MD on 4/22/2022 at 5:13 PM    Discharging Hospitalist

## 2022-04-22 NOTE — PROGRESS NOTES
Physical Therapy    Physical Therapy Treatment Note  Name: Amaya Crouch MRN: [id number] :   1940   Date:  2022   Admission Date: 2022 Room:  -A   Restrictions/Precautions:          Fall risk, suprapubic catheter, tele, IV, Sp02, Tele, confusion, Contact, general.  Chart reviewed, patient cleared for activity. Infectious disease NP present end of session to discuss with pt, RN present intermittently throughout session. Subjective:  Patient states:  Amenable to therapy visit. \"Watch out for that needle (PT explains no needle in pulse ox). Oh I thought there was a needle there\", \"when will my therapy start? \". Pt continues to demonstrate confusion this session. Pain:   Location, Type, Intensity (0/10 to 10/10):  No c/o, however describes pain r/t catheter again this session  Objective:    Observation:  Pt is resting in semi-fowlers with  present. Therapeutic Activity Training:   Cues given for safety, sequence, UE/LE placement, awareness, and balance. *Min increased time for setup of room, management of lines  Bed mobility:  From [facility] raised to 60*, PT cues pt to advance LE to EOB, pt with mild confusion with task, improved with time. Heavy Mod A was required to advance hips and trunk. Pt with min c/o abdominal pain with transition, increased cues to reach to bed rail for support. With cues ans min increased time pt was able to scoot to EOB for feet to floor, BUE support SBA. Seated balance EOB x3' during vitals monitoring and preparation for transfer. Chair was placed ~2ft away at EOB for safety. Sit-stand:  From EOB>RW with CGA and min cues for UE placement. Stand-step transfer: PT directs pt to chair. Pt able to AMB x2 ft to chair with RW, decreased héctor and foot clearance, increased time required with CGA, significant cues for directional turning and safe alignment with chair.   Return to seated in chair with CGA, pt demonstrated fair- eccentric control, increased fatigue by end of session. Assessment / Impression:    Pt remains appropriate for SNF  Tolerance of treatment:  fair   Adverse Reaction: fatigue    Plan for Next Session:    Train and educate using established plan to improve functional mobility, safety, and independence.   Time in:  09:27  Time out:  09:55  Timed treatment minutes:  28  Total treatment time:  28  Electronically signed by:    Cyndee Núñez, PT  4/22/2022, 12:45 PM

## 2022-04-23 NOTE — PROGRESS NOTES
Pt left via EMS with all discharge paperwork, all personal items, pt on room air with RR even and unlabored, no noted s/s of pain or distress. Pt understood that he was leaving and going to [facility].

## 2022-04-25 ENCOUNTER — HOSPITAL ENCOUNTER (OUTPATIENT)
Age: 82
Setting detail: SPECIMEN
Discharge: HOME | End: 2022-04-25

## 2022-04-25 LAB
ALBUMIN SERPL-MCNC: 3.3 GM/DL (ref 3.4–5)
ALP BLD-CCNC: 77 IU/L (ref 40–128)
ALT SERPL-CCNC: 21 U/L (ref 10–40)
ANION GAP SERPL CALCULATED.3IONS-SCNC: 13 MMOL/L (ref 4–16)
AST SERPL-CCNC: 21 IU/L (ref 15–37)
BASOPHILS ABSOLUTE: 0.1 K/CU MM
BASOPHILS RELATIVE PERCENT: 0.7 % (ref 0–1)
BILIRUB SERPL-MCNC: 0.2 MG/DL (ref 0–1)
BUN BLDV-MCNC: 34 MG/DL (ref 6–23)
C-REACTIVE PROTEIN, HIGH SENSITIVITY: 6.2 MG/L
CALCIUM SERPL-MCNC: 8.6 MG/DL (ref 8.3–10.6)
CHLORIDE BLD-SCNC: 104 MMOL/L (ref 99–110)
CO2: 23 MMOL/L (ref 21–32)
CREAT SERPL-MCNC: 2.2 MG/DL (ref 0.9–1.3)
DIFFERENTIAL TYPE: ABNORMAL
EOSINOPHILS ABSOLUTE: 0.7 K/CU MM
EOSINOPHILS RELATIVE PERCENT: 9.6 % (ref 0–3)
ERYTHROCYTE SEDIMENTATION RATE: 74 MM/HR (ref 0–20)
GFR AFRICAN AMERICAN: 35 ML/MIN/1.73M2
GFR NON-AFRICAN AMERICAN: 29 ML/MIN/1.73M2
GLUCOSE BLD-MCNC: 130 MG/DL (ref 70–99)
HCT VFR BLD CALC: 25.3 % (ref 42–52)
HEMOGLOBIN: 7.7 GM/DL (ref 13.5–18)
IMMATURE NEUTROPHIL %: 0.3 % (ref 0–0.43)
LYMPHOCYTES ABSOLUTE: 1.7 K/CU MM
LYMPHOCYTES RELATIVE PERCENT: 24.8 % (ref 24–44)
MCH RBC QN AUTO: 29.4 PG (ref 27–31)
MCHC RBC AUTO-ENTMCNC: 30.4 % (ref 32–36)
MCV RBC AUTO: 96.6 FL (ref 78–100)
MONOCYTES ABSOLUTE: 0.7 K/CU MM
MONOCYTES RELATIVE PERCENT: 9.4 % (ref 0–4)
NUCLEATED RBC %: 0 %
PDW BLD-RTO: 15.9 % (ref 11.7–14.9)
PLATELET # BLD: 234 K/CU MM (ref 140–440)
PMV BLD AUTO: 12 FL (ref 7.5–11.1)
POTASSIUM SERPL-SCNC: 4.7 MMOL/L (ref 3.5–5.1)
RBC # BLD: 2.62 M/CU MM (ref 4.6–6.2)
SEGMENTED NEUTROPHILS ABSOLUTE COUNT: 3.9 K/CU MM
SEGMENTED NEUTROPHILS RELATIVE PERCENT: 55.2 % (ref 36–66)
SODIUM BLD-SCNC: 140 MMOL/L (ref 135–145)
TOTAL IMMATURE NEUTOROPHIL: 0.02 K/CU MM
TOTAL NUCLEATED RBC: 0 K/CU MM
TOTAL PROTEIN: 5.9 GM/DL (ref 6.4–8.2)
WBC # BLD: 7 K/CU MM (ref 4–10.5)

## 2022-04-25 PROCEDURE — 85652 RBC SED RATE AUTOMATED: CPT

## 2022-04-25 PROCEDURE — 86140 C-REACTIVE PROTEIN: CPT

## 2022-04-25 PROCEDURE — 85025 COMPLETE CBC W/AUTO DIFF WBC: CPT

## 2022-04-25 PROCEDURE — 36415 COLL VENOUS BLD VENIPUNCTURE: CPT

## 2022-04-25 PROCEDURE — 80053 COMPREHEN METABOLIC PANEL: CPT

## 2022-04-28 ENCOUNTER — HOSPITAL ENCOUNTER (OUTPATIENT)
Age: 82
Setting detail: SPECIMEN
Discharge: HOME | End: 2022-04-28

## 2022-04-28 LAB
ESTIMATED AVERAGE GLUCOSE: 200 MG/DL
HBA1C MFR BLD: 8.6 % (ref 4.2–6.3)
HEMOCCULT STL QL: NEGATIVE
TSH HIGH SENSITIVITY: 7.59 UIU/ML (ref 0.27–4.2)

## 2022-04-28 PROCEDURE — 36415 COLL VENOUS BLD VENIPUNCTURE: CPT

## 2022-04-28 PROCEDURE — 84443 ASSAY THYROID STIM HORMONE: CPT

## 2022-04-28 PROCEDURE — 83036 HEMOGLOBIN GLYCOSYLATED A1C: CPT

## 2022-04-28 PROCEDURE — 82272 OCCULT BLD FECES 1-3 TESTS: CPT

## 2022-05-01 ENCOUNTER — HOSPITAL ENCOUNTER (OUTPATIENT)
Age: 82
Setting detail: SPECIMEN
Discharge: HOME | End: 2022-05-01
Payer: MEDICARE

## 2022-05-01 PROCEDURE — 82270 OCCULT BLOOD FECES: CPT

## 2022-05-02 ENCOUNTER — HOSPITAL ENCOUNTER (OUTPATIENT)
Age: 82
Setting detail: SPECIMEN
Discharge: HOME | End: 2022-05-02
Payer: MEDICARE

## 2022-05-02 LAB
ALBUMIN SERPL-MCNC: 4 GM/DL (ref 3.4–5)
ALP BLD-CCNC: 90 IU/L (ref 40–128)
ALT SERPL-CCNC: 15 U/L (ref 10–40)
ANION GAP SERPL CALCULATED.3IONS-SCNC: 16 MMOL/L (ref 4–16)
AST SERPL-CCNC: 17 IU/L (ref 15–37)
BACTERIA: ABNORMAL /HPF
BASOPHILS ABSOLUTE: 0.1 K/CU MM
BASOPHILS RELATIVE PERCENT: 0.6 % (ref 0–1)
BILIRUB SERPL-MCNC: 0.2 MG/DL (ref 0–1)
BILIRUBIN URINE: NEGATIVE MG/DL
BLOOD, URINE: ABNORMAL
BUN BLDV-MCNC: 32 MG/DL (ref 6–23)
C-REACTIVE PROTEIN, HIGH SENSITIVITY: 3.2 MG/L
CALCIUM SERPL-MCNC: 9.6 MG/DL (ref 8.3–10.6)
CHLORIDE BLD-SCNC: 100 MMOL/L (ref 99–110)
CLARITY: ABNORMAL
CO2: 19 MMOL/L (ref 21–32)
COLOR: YELLOW
CREAT SERPL-MCNC: 2.5 MG/DL (ref 0.9–1.3)
DIFFERENTIAL TYPE: ABNORMAL
EOSINOPHILS ABSOLUTE: 1.1 K/CU MM
EOSINOPHILS RELATIVE PERCENT: 12.6 % (ref 0–3)
ERYTHROCYTE SEDIMENTATION RATE: 34 MM/HR (ref 0–20)
GFR AFRICAN AMERICAN: 30 ML/MIN/1.73M2
GFR NON-AFRICAN AMERICAN: 25 ML/MIN/1.73M2
GLUCOSE BLD-MCNC: 146 MG/DL (ref 70–99)
GLUCOSE, URINE: NEGATIVE MG/DL
HCT VFR BLD CALC: 29.8 % (ref 42–52)
HEMOGLOBIN: 9 GM/DL (ref 13.5–18)
IMMATURE NEUTROPHIL %: 0.3 % (ref 0–0.43)
KETONES, URINE: NEGATIVE MG/DL
LEUKOCYTE ESTERASE, URINE: ABNORMAL
LYMPHOCYTES ABSOLUTE: 1.8 K/CU MM
LYMPHOCYTES RELATIVE PERCENT: 20.2 % (ref 24–44)
MCH RBC QN AUTO: 29.8 PG (ref 27–31)
MCHC RBC AUTO-ENTMCNC: 30.2 % (ref 32–36)
MCV RBC AUTO: 98.7 FL (ref 78–100)
MONOCYTES ABSOLUTE: 0.6 K/CU MM
MONOCYTES RELATIVE PERCENT: 6.8 % (ref 0–4)
MUCUS: ABNORMAL HPF
NITRITE URINE, QUANTITATIVE: NEGATIVE
NUCLEATED RBC %: 0 %
PDW BLD-RTO: 16.2 % (ref 11.7–14.9)
PH, URINE: 8.5 (ref 5–8)
PLATELET # BLD: 355 K/CU MM (ref 140–440)
PMV BLD AUTO: 11.7 FL (ref 7.5–11.1)
POTASSIUM SERPL-SCNC: 7.5 MMOL/L (ref 3.5–5.1)
PROTEIN UA: ABNORMAL MG/DL
RBC # BLD: 3.02 M/CU MM (ref 4.6–6.2)
RBC URINE: 4 /HPF (ref 0–3)
SEGMENTED NEUTROPHILS ABSOLUTE COUNT: 5.2 K/CU MM
SEGMENTED NEUTROPHILS RELATIVE PERCENT: 59.5 % (ref 36–66)
SODIUM BLD-SCNC: 135 MMOL/L (ref 135–145)
SPECIFIC GRAVITY UA: 1.01 (ref 1–1.03)
TOTAL IMMATURE NEUTOROPHIL: 0.03 K/CU MM
TOTAL NUCLEATED RBC: 0 K/CU MM
TOTAL PROTEIN: 7 GM/DL (ref 6.4–8.2)
TRANSITIONAL EPITHELIAL: <1 /HPF
UROBILINOGEN, URINE: NORMAL MG/DL (ref 0.2–1)
WBC # BLD: 8.7 K/CU MM (ref 4–10.5)
WBC UA: 166 /HPF (ref 0–2)

## 2022-05-02 PROCEDURE — 85652 RBC SED RATE AUTOMATED: CPT

## 2022-05-02 PROCEDURE — 81001 URINALYSIS AUTO W/SCOPE: CPT

## 2022-05-02 PROCEDURE — 80053 COMPREHEN METABOLIC PANEL: CPT

## 2022-05-02 PROCEDURE — 85025 COMPLETE CBC W/AUTO DIFF WBC: CPT

## 2022-05-02 PROCEDURE — 36415 COLL VENOUS BLD VENIPUNCTURE: CPT

## 2022-05-02 PROCEDURE — 87086 URINE CULTURE/COLONY COUNT: CPT

## 2022-05-02 PROCEDURE — 86140 C-REACTIVE PROTEIN: CPT

## 2022-05-03 ENCOUNTER — HOSPITAL ENCOUNTER (OUTPATIENT)
Age: 82
Setting detail: SPECIMEN
Discharge: HOME | End: 2022-05-03

## 2022-05-03 LAB
ANION GAP SERPL CALCULATED.3IONS-SCNC: 15 MMOL/L (ref 4–16)
BUN BLDV-MCNC: 29 MG/DL (ref 6–23)
CALCIUM SERPL-MCNC: 8.9 MG/DL (ref 8.3–10.6)
CHLORIDE BLD-SCNC: 103 MMOL/L (ref 99–110)
CO2: 19 MMOL/L (ref 21–32)
CREAT SERPL-MCNC: 2.4 MG/DL (ref 0.9–1.3)
CULTURE: NORMAL
GFR AFRICAN AMERICAN: 31 ML/MIN/1.73M2
GFR NON-AFRICAN AMERICAN: 26 ML/MIN/1.73M2
GLUCOSE BLD-MCNC: 153 MG/DL (ref 70–99)
Lab: NORMAL
POTASSIUM SERPL-SCNC: 5.6 MMOL/L (ref 3.5–5.1)
SODIUM BLD-SCNC: 137 MMOL/L (ref 135–145)
SPECIMEN: NORMAL

## 2022-05-03 PROCEDURE — 80048 BASIC METABOLIC PNL TOTAL CA: CPT

## 2022-05-03 PROCEDURE — 36415 COLL VENOUS BLD VENIPUNCTURE: CPT

## 2022-05-03 NOTE — PROGRESS NOTES
Physician Progress Note      PATIENT:               Allen Daniel  CSN #:                  [id number]  :                       1940  ADMIT DATE:       2022 9:44 AM  DISCH DATE:        2022 11:28 PM  RESPONDING  PROVIDER #:        Darylene Bors          QUERY TEXT:    Patient admitted with UTI. Documentation by ID on , \"\"Sepsis Secondary to   Complicated UTI, Serratia marcescens, from  Chronic Urinary Retention, SPC, Possible CAUTI\". If possible, please document   in the progress notes and discharge summary if sepsis was: The medical record reflects the following:  Risk Factors: UTI, DM  Clinical Indicators: metabolic encephalopathy. WBC 9.9, lactic acid 1.1,   procal 6.27. Tmax 99.4  Treatment: IV Maxipime, IV Rocphin, po Ceftin, IV Merrem    GREG RenaeN, RN, [facility]  Clinical   [phone]  Options provided:  -- Sepsis secondary to possible CAUTI, POA, confirmed after study  -- Sepsis, POA, confirmed after study  -- Sepsis ruled out after study  -- Other - I will add my own diagnosis  -- Disagree - Not applicable / Not valid  -- Disagree - Clinically unable to determine / Unknown  -- Refer to Clinical Documentation Reviewer    PROVIDER RESPONSE TEXT:    Sepsis secondary to possible CAUTI, POA, confirmed after study.     Query created by: Rebecca Courtney on 2022 11:26 AM      Electronically signed by:  Darylene Bors 5/3/2022 7:19 AM

## 2022-05-04 ENCOUNTER — HOSPITAL ENCOUNTER (OUTPATIENT)
Age: 82
Setting detail: SPECIMEN
Discharge: HOME | End: 2022-05-04

## 2022-05-04 PROCEDURE — 80048 BASIC METABOLIC PNL TOTAL CA: CPT

## 2022-05-05 ENCOUNTER — HOSPITAL ENCOUNTER (OUTPATIENT)
Age: 82
Setting detail: SPECIMEN
Discharge: HOME | End: 2022-05-05

## 2022-05-05 LAB
ANION GAP SERPL CALCULATED.3IONS-SCNC: 14 MMOL/L (ref 4–16)
BUN BLDV-MCNC: 32 MG/DL (ref 6–23)
CALCIUM SERPL-MCNC: 8.3 MG/DL (ref 8.3–10.6)
CHLORIDE BLD-SCNC: 102 MMOL/L (ref 99–110)
CO2: 25 MMOL/L (ref 21–32)
CREAT SERPL-MCNC: 2.3 MG/DL (ref 0.9–1.3)
GFR AFRICAN AMERICAN: 33 ML/MIN/1.73M2
GFR NON-AFRICAN AMERICAN: 27 ML/MIN/1.73M2
GLUCOSE BLD-MCNC: 48 MG/DL (ref 70–99)
HCT VFR BLD CALC: 26.3 % (ref 42–52)
HEMOCCULT SP1 STL QL: NEGATIVE
HEMOGLOBIN: 8.1 GM/DL (ref 13.5–18)
MCH RBC QN AUTO: 29.8 PG (ref 27–31)
MCHC RBC AUTO-ENTMCNC: 30.8 % (ref 32–36)
MCV RBC AUTO: 96.7 FL (ref 78–100)
PDW BLD-RTO: 16.2 % (ref 11.7–14.9)
PLATELET # BLD: 286 K/CU MM (ref 140–440)
PMV BLD AUTO: 11.4 FL (ref 7.5–11.1)
POTASSIUM SERPL-SCNC: 3.8 MMOL/L (ref 3.5–5.1)
RBC # BLD: 2.72 M/CU MM (ref 4.6–6.2)
SODIUM BLD-SCNC: 141 MMOL/L (ref 135–145)
WBC # BLD: 7.1 K/CU MM (ref 4–10.5)

## 2022-05-05 PROCEDURE — 36415 COLL VENOUS BLD VENIPUNCTURE: CPT

## 2022-05-05 PROCEDURE — 80048 BASIC METABOLIC PNL TOTAL CA: CPT

## 2022-05-05 PROCEDURE — 85027 COMPLETE CBC AUTOMATED: CPT

## 2022-05-09 ENCOUNTER — HOSPITAL ENCOUNTER (OUTPATIENT)
Age: 82
Setting detail: SPECIMEN
Discharge: HOME | End: 2022-05-09

## 2022-05-09 LAB
ALBUMIN SERPL-MCNC: 4 GM/DL (ref 3.4–5)
ALP BLD-CCNC: 94 IU/L (ref 40–128)
ALT SERPL-CCNC: 9 U/L (ref 10–40)
ANION GAP SERPL CALCULATED.3IONS-SCNC: 17 MMOL/L (ref 4–16)
AST SERPL-CCNC: 13 IU/L (ref 15–37)
BACTERIA: NEGATIVE /HPF
BILIRUB SERPL-MCNC: 0.2 MG/DL (ref 0–1)
BILIRUBIN URINE: NEGATIVE MG/DL
BLOOD, URINE: ABNORMAL
BUN BLDV-MCNC: 31 MG/DL (ref 6–23)
C-REACTIVE PROTEIN, HIGH SENSITIVITY: 4 MG/L
CALCIUM SERPL-MCNC: 9.1 MG/DL (ref 8.3–10.6)
CHLORIDE BLD-SCNC: 108 MMOL/L (ref 99–110)
CLARITY: CLEAR
CO2: 20 MMOL/L (ref 21–32)
COLOR: YELLOW
CREAT SERPL-MCNC: 1.8 MG/DL (ref 0.9–1.3)
DIFFERENTIAL TYPE: ABNORMAL
EOSINOPHILS ABSOLUTE: 1.5 K/CU MM
EOSINOPHILS RELATIVE PERCENT: 20 % (ref 0–3)
ERYTHROCYTE SEDIMENTATION RATE: 100 MM/HR (ref 0–20)
GFR AFRICAN AMERICAN: 44 ML/MIN/1.73M2
GFR NON-AFRICAN AMERICAN: 36 ML/MIN/1.73M2
GLUCOSE BLD-MCNC: 124 MG/DL (ref 70–99)
GLUCOSE, URINE: 250 MG/DL
HCT VFR BLD CALC: 31.3 % (ref 42–52)
HEMOGLOBIN: 9.3 GM/DL (ref 13.5–18)
KETONES, URINE: NEGATIVE MG/DL
LEUKOCYTE ESTERASE, URINE: ABNORMAL
LYMPHOCYTES ABSOLUTE: 1.9 K/CU MM
LYMPHOCYTES RELATIVE PERCENT: 25 % (ref 24–44)
MCH RBC QN AUTO: 29.8 PG (ref 27–31)
MCHC RBC AUTO-ENTMCNC: 29.7 % (ref 32–36)
MCV RBC AUTO: 100.3 FL (ref 78–100)
MONOCYTES ABSOLUTE: 0.2 K/CU MM
MONOCYTES RELATIVE PERCENT: 3 % (ref 0–4)
MUCUS: ABNORMAL HPF
NITRITE URINE, QUANTITATIVE: NEGATIVE
PDW BLD-RTO: 16.1 % (ref 11.7–14.9)
PH, URINE: 7 (ref 5–8)
PLATELET # BLD: 279 K/CU MM (ref 140–440)
PMV BLD AUTO: 11.4 FL (ref 7.5–11.1)
POTASSIUM SERPL-SCNC: 4.6 MMOL/L (ref 3.5–5.1)
PROTEIN UA: 30 MG/DL
RBC # BLD: 3.12 M/CU MM (ref 4.6–6.2)
RBC URINE: 10 /HPF (ref 0–3)
SEGMENTED NEUTROPHILS ABSOLUTE COUNT: 4.1 K/CU MM
SEGMENTED NEUTROPHILS RELATIVE PERCENT: 52 % (ref 36–66)
SODIUM BLD-SCNC: 145 MMOL/L (ref 135–145)
SPECIFIC GRAVITY UA: 1.01 (ref 1–1.03)
SQUAMOUS EPITHELIAL: <1 /HPF
TOTAL PROTEIN: 6.7 GM/DL (ref 6.4–8.2)
TRICHOMONAS: ABNORMAL /HPF
UROBILINOGEN, URINE: 0.2 MG/DL (ref 0.2–1)
WBC # BLD: 7.7 K/CU MM (ref 4–10.5)
WBC CLUMP: ABNORMAL /HPF
WBC UA: 181 /HPF (ref 0–2)

## 2022-05-09 PROCEDURE — 80053 COMPREHEN METABOLIC PANEL: CPT

## 2022-05-09 PROCEDURE — 36415 COLL VENOUS BLD VENIPUNCTURE: CPT

## 2022-05-09 PROCEDURE — 85007 BL SMEAR W/DIFF WBC COUNT: CPT

## 2022-05-09 PROCEDURE — 87086 URINE CULTURE/COLONY COUNT: CPT

## 2022-05-09 PROCEDURE — 81001 URINALYSIS AUTO W/SCOPE: CPT

## 2022-05-09 PROCEDURE — 85652 RBC SED RATE AUTOMATED: CPT

## 2022-05-09 PROCEDURE — 86140 C-REACTIVE PROTEIN: CPT

## 2022-05-09 PROCEDURE — 85027 COMPLETE CBC AUTOMATED: CPT

## 2022-05-10 LAB
CULTURE: NORMAL
Lab: NORMAL
SPECIMEN: NORMAL

## 2022-05-12 ENCOUNTER — HOSPITAL ENCOUNTER (OUTPATIENT)
Age: 82
Setting detail: SPECIMEN
Discharge: HOME | End: 2022-05-12

## 2022-05-12 LAB
ANION GAP SERPL CALCULATED.3IONS-SCNC: 12 MMOL/L (ref 4–16)
BUN BLDV-MCNC: 31 MG/DL (ref 6–23)
CALCIUM SERPL-MCNC: 8.6 MG/DL (ref 8.3–10.6)
CHLORIDE BLD-SCNC: 106 MMOL/L (ref 99–110)
CO2: 23 MMOL/L (ref 21–32)
CREAT SERPL-MCNC: 1.5 MG/DL (ref 0.9–1.3)
GFR AFRICAN AMERICAN: 54 ML/MIN/1.73M2
GFR NON-AFRICAN AMERICAN: 45 ML/MIN/1.73M2
GLUCOSE BLD-MCNC: 123 MG/DL (ref 70–99)
HCT VFR BLD CALC: 26.6 % (ref 42–52)
HEMOGLOBIN: 8.1 GM/DL (ref 13.5–18)
MCH RBC QN AUTO: 29.8 PG (ref 27–31)
MCHC RBC AUTO-ENTMCNC: 30.5 % (ref 32–36)
MCV RBC AUTO: 97.8 FL (ref 78–100)
PDW BLD-RTO: 15.9 % (ref 11.7–14.9)
PLATELET # BLD: 231 K/CU MM (ref 140–440)
PMV BLD AUTO: 11.4 FL (ref 7.5–11.1)
POTASSIUM SERPL-SCNC: 4.2 MMOL/L (ref 3.5–5.1)
RBC # BLD: 2.72 M/CU MM (ref 4.6–6.2)
SODIUM BLD-SCNC: 141 MMOL/L (ref 135–145)
URIC ACID: 3.2 MG/DL (ref 3.5–7.2)
WBC # BLD: 6.9 K/CU MM (ref 4–10.5)

## 2022-05-12 PROCEDURE — 80048 BASIC METABOLIC PNL TOTAL CA: CPT

## 2022-05-12 PROCEDURE — 85027 COMPLETE CBC AUTOMATED: CPT

## 2022-05-12 PROCEDURE — 84550 ASSAY OF BLOOD/URIC ACID: CPT

## 2022-05-12 PROCEDURE — 36415 COLL VENOUS BLD VENIPUNCTURE: CPT

## 2022-05-16 ENCOUNTER — HOSPITAL ENCOUNTER (OUTPATIENT)
Age: 82
Setting detail: SPECIMEN
Discharge: HOME | End: 2022-05-16

## 2022-05-16 LAB
ANION GAP SERPL CALCULATED.3IONS-SCNC: 14 MMOL/L (ref 4–16)
BUN BLDV-MCNC: 29 MG/DL (ref 6–23)
CALCIUM SERPL-MCNC: 8.5 MG/DL (ref 8.3–10.6)
CHLORIDE BLD-SCNC: 104 MMOL/L (ref 99–110)
CO2: 21 MMOL/L (ref 21–32)
CREAT SERPL-MCNC: 1.6 MG/DL (ref 0.9–1.3)
GFR AFRICAN AMERICAN: 50 ML/MIN/1.73M2
GFR NON-AFRICAN AMERICAN: 41 ML/MIN/1.73M2
GLUCOSE BLD-MCNC: 161 MG/DL (ref 70–99)
POTASSIUM SERPL-SCNC: 3.9 MMOL/L (ref 3.5–5.1)
SODIUM BLD-SCNC: 139 MMOL/L (ref 135–145)

## 2022-05-16 PROCEDURE — 80048 BASIC METABOLIC PNL TOTAL CA: CPT

## 2022-05-16 PROCEDURE — 36415 COLL VENOUS BLD VENIPUNCTURE: CPT

## 2022-05-19 ENCOUNTER — HOSPITAL ENCOUNTER (OUTPATIENT)
Age: 82
Setting detail: SPECIMEN
Discharge: HOME | End: 2022-05-19

## 2022-05-19 LAB
ANION GAP SERPL CALCULATED.3IONS-SCNC: 11 MMOL/L (ref 4–16)
BUN BLDV-MCNC: 30 MG/DL (ref 6–23)
CALCIUM SERPL-MCNC: 8.4 MG/DL (ref 8.3–10.6)
CHLORIDE BLD-SCNC: 104 MMOL/L (ref 99–110)
CO2: 24 MMOL/L (ref 21–32)
CREAT SERPL-MCNC: 1.5 MG/DL (ref 0.9–1.3)
GFR AFRICAN AMERICAN: 54 ML/MIN/1.73M2
GFR NON-AFRICAN AMERICAN: 45 ML/MIN/1.73M2
GLUCOSE BLD-MCNC: 216 MG/DL (ref 70–99)
POTASSIUM SERPL-SCNC: 3.6 MMOL/L (ref 3.5–5.1)
SODIUM BLD-SCNC: 139 MMOL/L (ref 135–145)

## 2022-05-19 PROCEDURE — 80048 BASIC METABOLIC PNL TOTAL CA: CPT

## 2022-05-19 PROCEDURE — 36415 COLL VENOUS BLD VENIPUNCTURE: CPT

## 2022-05-19 NOTE — DISCHARGE SUMMARY
Patient Name: Joslyn Wheeler  Patient :  1940  Patient MRN:   [id number]      Admission Date:  3/30/2022  Discharge Date:   2022. Admitting diagnosis: Metabolic encephalopathy ( 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 He had been progressively more confused and lethargic at home throughout that day. Recently he had had some episodes of nausea and vomiting. His oral intake has been diminished. There is no reported fever, seizure or trauma to speak of. His initial vital signs showed some hypotension and he appeared dehydrated. He is given IV fluids and Lasix because of an elevated BNP. His urinalysis was abnormal and he had elevated white blood count. Urine cultures were eventually positive and he was treated for sepsis due to a urinary tract infection. He required IV hydration, management of his cardiac arrhythmia and blood sugar issues. He was receiving IV antibiotics with a plan to transition to oral agents during his second week on rehab. Prior (baseline) level of function: Independent.     Current level of function:         Current  IRF-LESTER and Goals:   Occupational Therapy:    Short Term Goals  Time Frame for Short term goals: STGs=LTGs :   Long Term Goals  Time Frame for Long term goals : 10-14 days or until d/c  Long Term Goal 1: Pt will complete grooming tasks Ind seated  Long Term Goal 2: Pt will complete total body bathing c S  Long Term Goal 3: Pt will complete UB dressing c setup  Long Term Goal 4: Pt will complete LB dressing c supervision using AE PRN  Long Term Goal 5: Pt will doff/don footwear c supervision using AE PRN  Long Term Goal 6: Pt will complete toileting c S  Long Term Goal 7: Pt will complete functional transfers (bed, chair, toilet, shower) c DME PRN and S  Long Term Goal 8: Pt will perform therex/therax to facilitate increased strength/endurance/ax tolerance (c emphasis on dynamic standing balance >8 mins, BUE endurance, cognitive retraining) c SBA :                                       Eating: Eating  Comment: assist to open packages/containers  CARE Score: 6  Discharge Goal: Independent       Oral Hygiene: Oral Hygiene  Comment: seated at sink  CARE Score: 6  Discharge Goal: Independent    UB/LB Bathing: Shower/Bathe Self  Comment: pt able to bathe UEs, chest, abdomen, thighs, perineal area and distal LEs; CGA in stance to bathe bottom  CARE Score: 4  Discharge Goal: Supervision or touching assistance    UB Dressing: Upper Body Dressing  Comment: Pt able to doff/don T shirt  CARE Score: 5  Discharge Goal: Set-up or clean-up assistance         LB Dressing: Lower Body Dressing  Comment: Pt able to trhead mccrary bag and BLEs intp pants and brief c increased time and verbal cues; assist to manage over hips  CARE Score: 3  Discharge Goal: Supervision or touching assistance    Donning and [place] Footwear: Putting On/Taking Off Footwear  Comment: Pt able to doff socks using reacher; S/U to don socks using sock aid  CARE Score: 5  Discharge Goal: Supervision or touching assistance      Toileting:  Toileting Hygiene  Comment: CGA for clothing management; pt did not have BM  CARE Score: 4  Discharge Goal: Supervision or touching assistance      Toilet Transfers:   Toilet Transfer  Comment: CGA c use of RW and grab bars  CARE Score: 4  Discharge Goal: Supervision or touching assistance    Physical Therapy:         Long Term Goals  Time Frame for Long term goals : 12-14 days  Long term goal 1: Pt will perform bed mobility with mod I  Long term goal 2: Pt will perform sit to stand and pivot transfers with mod I, car transfers with CGA  Long term goal 3: Pt will ambulate 10' with 2ww with mod I, up to 150' with supervision including uneven surfaces  Long term goal 4: Pt will ascend/descend curb step with 2ww and up to 4 steps with B rails with supervision  Long term goal 5: Pt will  object from floor with 2ww and reacher with supervision      Bed Mobility:   Sit to Lying  Comment:  (required assist for LE's per BALBUENA note)  CARE Score: 3  Discharge Goal: Independent  Roll Left and Right  Comment:  (Per PTA documentation)  CARE Score: 4  Discharge Goal: Independent  Lying to Sitting on Side of Bed  Comment:  (per therapy documentation)  CARE Score: 4  Discharge Goal: Independent    Transfers:    Sit to Stand  Comment: SB-CGA from [facility], extra time and cues   Reason if not Attempted: Not attempted due to medical condition or safety concerns  CARE Score: 4  Discharge Goal: Independent  Chair/Bed-to-Chair Transfer  Comment: CGA for balance with cues for lining up to chair before attempting to sit  Reason if not Attempted: Not attempted due to medical condition or safety concerns  CARE Score: 4  Discharge Goal: Independent  Toilet Transfer  Comment: max assist and max verbal cues for techique, posture, safety  CARE Score: 2  Car Transfer  Comment: SBA for balance, self assists LEs in/out of car with extra time  Reason if not Attempted: Not attempted due to medical condition or safety concerns  CARE Score: 4  Discharge Goal: Supervision or touching assistance    Ambulation: Walking Ability  Does the Patient Walk?: Yes     Walk 10 Feet  Comment: CGA for balance with RW, cues for increased hip and knee extension with initail ambulation  Reason if not Attempted: Not attempted due to medical condition or safety concerns  CARE Score: 4  Discharge Goal: Independent     Walk 50 Feet with Two Turns  Comment: CGA for balance with RW, amb 80' max distance  Reason if not Attempted: Not attempted due to medical condition or safety concerns  CARE Score: 4  Discharge Goal: Supervision or touching assistance     Walk 150 Feet  Comment: 80' max distance  Reason if not Attempted: Not attempted due to medical condition or safety concerns  CARE Score: 88  Discharge Goal: Supervision or touching assistance     Walking 10 Feet on Uneven Surfaces  Comment: CGA for balance with RW over carpet with bean bags underneath  Reason if not Attempted: Not attempted due to medical condition or safety concerns  CARE Score: 4  Discharge Goal: Supervision or touching assistance     1 Step (Curb)  Comment: CGA for balance with RW, min seq cus  Reason if not Attempted: Not attempted due to medical condition or safety concerns  CARE Score: 4  Discharge Goal: Supervision or touching assistance     4 Steps  Comment: CGA for balance using B rails, min seq cues, extra time, max effort  Reason if not Attempted: Not attempted due to medical condition or safety concerns  CARE Score: 4  Discharge Goal: Supervision or touching assistance     12 Steps  Comment: 4 steps pt's max effort  Reason if not Attempted: Not applicable  CARE Score: 9  Discharge Goal: Not Applicable       Wheelchair:  w/c Ability: Wheelchair Ability  Uses a Wheelchair and/or Scooter?: No  Wheel 50 Feet with Two Turns  Comment: Supervision and max cues for sequencing UEs and LEs together, continuation of task  CARE Score: 4  Wheel 150 Feet  Comment: 91' max distance with max effort, cues and req 27' to complete.   CARE Score: 3          Balance:        Object: Picking Up Object  Comment: SBA for balance at  using reacher, min safety cues  Reason if not Attempted: Not attempted due to medical condition or safety concerns  CARE Score: 4  Discharge Goal: Supervision or touching assistance    I      Exam:    Blood pressure (!) 146/76, pulse 80, temperature 97.1 °F (36.2 °C), temperature source Oral, resp. rate 16, height 5' 8\" (1.727 m), weight 194 lb 10.7 oz (88.3 kg), SpO2 100 %. General: Sitting up in a wheelchair. Quiet. Aware of his discharge plan. HEENT: Gazing right and left. MMM. Neck supple. No JVD. Pulmonary: Unlabored and clear. Cardiac: Regular rate and rhythm. Abdomen: Patient's abdomen is soft and nondistended. Bowel sounds were present throughout. There was no rebound, guarding or masses noted. Suprapubic catheter in place with no tenderness. Upper extremities: Patient was able to bring both hands up to meet mine. Fatigues quickly but has normal tone without tremor. Lower extremities: 3+/5 strength throughout. Quick to fatigue. Trace edema throughout. No reflexes. No signs of DVT. Sitting balance was fair+. Standing balance was poor.     Lab Results   Component Value Date    WBC 6.9 05/12/2022    HGB 8.1 (L) 05/12/2022    HCT 26.6 (L) 05/12/2022    MCV 97.8 05/12/2022     05/12/2022     Lab Results   Component Value Date    INR 0.97 04/18/2022    INR 1.20 03/17/2022    INR 0.99 05/19/2015    PROTIME 12.5 04/18/2022    PROTIME 15.5 (H) 03/17/2022    PROTIME 11.3 05/19/2015     Lab Results   Component Value Date    CREATININE 1.5 (H) 05/19/2022    BUN 30 (H) 05/19/2022     05/19/2022    K 3.6 05/19/2022     05/19/2022    CO2 24 05/19/2022     Lab Results   Component Value Date    ALT 9 (L) 05/09/2022    AST 13 (L) 05/09/2022    ALKPHOS 94 05/09/2022    BILITOT 0.2 05/09/2022         The patient presented to the ARU with the above history requiring a multidisciplinary treatment plan including close medical supervision by the [facility] [address] Director. The patient participated in the prescribed therapy treatment plan with fair compliance and fair tolerance. They avoided significant medical complications. By the time of discharge the patient had become minimally safer with adaptive equipment to transfer and toilet with a walker and assistance from staff. His family was unable to provide this level of assistance in the home so he discharged to a local skilled nursing facility was elected. He was on an oral diet without choking/coughing and was back to their baseline with regards to bowel and bladder control. Discharge instructions were reviewed with the patient and family. The patient is to follow up with the PCP in 1 week. No driving. [facility] PT/OT/RN will be arranged. Medication List      START taking these medications    carvedilol 3.125 MG tablet  Commonly known as: COREG  Take 1 tablet by mouth 2 times daily (with meals)     sodium bicarbonate 650 MG tablet  Take 1 tablet by mouth 3 times daily        CHANGE how you take these medications    atorvastatin 40 MG tablet  Commonly known as: LIPITOR  Take 1 tablet by mouth at bedtime  What changed:   · medication strength  · how much to take  · when to take this        CONTINUE taking these medications    acetaminophen 325 MG tablet  Commonly known as: Tylenol  Take 2 tablets by mouth every 6 hours as needed for Pain     allopurinol 100 MG tablet  Commonly known as: ZYLOPRIM     docusate 100 MG Caps  Commonly known as: COLACE, DULCOLAX  Take 100 mg by mouth 2 times daily as needed for Constipation.         STOP taking these medications    albuterol sulfate  (90 Base) MCG/ACT inhaler  Commonly known as: Proventil HFA     amLODIPine 10 MG tablet  Commonly known as: NORVASC     glimepiride 4 MG tablet  Commonly known as: AMARYL     LEVEMIR SC     lisinopril 5 MG tablet  Commonly known as: PRINIVIL;ZESTRIL     pregabalin 50 MG capsule  Commonly known as: Brandan Negro           Where to Get Your Medications      Information about where to get these medications is not yet available    Ask your nurse or doctor about these medications  · atorvastatin 40 MG tablet  · carvedilol 3.125 MG tablet  · sodium bicarbonate 650 MG tablet         CONDITION ON DISCHARGE: Stable. The prognosis is fair for further improvements in ADL's and safety with adapted gait/transfers. Record review, patient exam, discharge instructions, medication reconciliation and summary for this discharge visit took more than 30 minutes.

## 2022-05-25 ENCOUNTER — OFFICE VISIT (OUTPATIENT)
Dept: INFECTIOUS DISEASES | Age: 82
End: 2022-05-25
Payer: MEDICARE

## 2022-05-25 VITALS
TEMPERATURE: 97.2 F | SYSTOLIC BLOOD PRESSURE: 151 MMHG | WEIGHT: 196.4 LBS | BODY MASS INDEX: 29.86 KG/M2 | RESPIRATION RATE: 18 BRPM | HEART RATE: 80 BPM | DIASTOLIC BLOOD PRESSURE: 80 MMHG

## 2022-05-25 DIAGNOSIS — Z09 HOSPITAL DISCHARGE FOLLOW-UP: Primary | ICD-10-CM

## 2022-05-25 DIAGNOSIS — Z93.59 SUPRAPUBIC CATHETER (HCC): ICD-10-CM

## 2022-05-25 PROCEDURE — G8417 CALC BMI ABV UP PARAM F/U: HCPCS | Performed by: INTERNAL MEDICINE

## 2022-05-25 PROCEDURE — 1123F ACP DISCUSS/DSCN MKR DOCD: CPT | Performed by: INTERNAL MEDICINE

## 2022-05-25 PROCEDURE — 1036F TOBACCO NON-USER: CPT | Performed by: INTERNAL MEDICINE

## 2022-05-25 PROCEDURE — G8427 DOCREV CUR MEDS BY ELIG CLIN: HCPCS | Performed by: INTERNAL MEDICINE

## 2022-05-25 PROCEDURE — 99213 OFFICE O/P EST LOW 20 MIN: CPT | Performed by: INTERNAL MEDICINE

## 2022-05-25 NOTE — PROGRESS NOTES
5/27/2022         Referring Physician: No ref. provider found  Primary Care Physician: Laura Cummings MD    Impression/Plan:   Diagnosis Orders   1. Hospital discharge follow-up     2. Suprapubic catheter [facility])         Discussion:  Hospital discharge follow-up  Completed abx for UTI. No symptoms at present. F/u with urology service. Return if symptoms worsen or fail to improve. 30 minutes was spent in the encounter; more than 50% of the face-to-face time was spent with the patient providing counseling and coordination of care. History: Kavon Brand is a 78 y.o.  male presenting today for follow-up. Medical history of prostate cancer status post prostatectomy, requiring a suprapubic urinary catheter. He was discharged on 4/22/2022 after a 4-day stay for acute metabolic encephalopathy. He was seen by the ID service for Serratia marcescens suprapubic catheter associated urinary tract infection. Initially received meropenem, followed by cefepime while in the hospital.  He was discharged to complete Bactrim 1 DS every 12 hours on 5/4/2022. He was discharged to [facility] home and was actually discharged from there on 5/19/2022. Review of Systems   All other systems reviewed and are negative.       No Known Allergies    Patient Active Problem List   Diagnosis    Gait disturbance    Generalized weakness    Diabetes mellitus ([address]    Osteoarthritis    Anemia of chronic disorder    Infected implanted bladder sphincter cuff    Escherichia coli urinary tract infection    Hypertension    Sepsis ([address]    Acute encephalopathy    Type 2 diabetes mellitus with hypoglycemia without coma ([address]    UTI (urinary tract infection) due to urinary indwelling catheter (HCC)    Hyperkalemia    Acute kidney failure (HCC)    Leg weakness, bilateral    Type 2 diabetes mellitus with neurological manifestations, uncontrolled ([address]    Complicated UTI (urinary tract infection)    Essential hypertension    Severe muscle deconditioning    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 [facility]    Frequent falls    Chronic kidney disease, stage 3a ([facility]    Uncontrolled type 2 diabetes mellitus with peripheral neuropathy ([facility]    Prostate cancer ([facility]    Suprapubic catheter ([facility]    [facility] to [facility]    Coagulase negative Staphylococcus bacteremia    Chronic kidney disease, stage IV (severe) (HCC)    Acute metabolic encephalopathy    SVT (supraventricular tachycardia) (HCC)    Metabolic encephalopathy    History of prostate cancer    Cigarette nicotine dependence without complication    Altered mental status    Serratia marcescens infection    Hypoglycemia   [facility] discharge follow-up       Current Outpatient Medications   Medication Sig Dispense Refill    dextromethorphan-guaiFENesin (DIABETIC TUSSIN DM)  MG/5ML syrup Take 10 mLs by mouth every 4 hours as needed for Cough      Glucagon, rDNA, (GLUCAGON EMERGENCY) 1 MG KIT Inject as directed as needed      loperamide (IMODIUM A-D) 2 MG tablet Take 2 mg by mouth 4 times daily as needed for Diarrhea      insulin glargine (LANTUS) 100 UNIT/ML injection vial Inject 15 Units into the skin daily      Melatonin 10 MG TABS Take 1 tablet by mouth at bedtime      omeprazole (PRILOSEC) 20 MG delayed release capsule Take 40 mg by mouth daily      levothyroxine (SYNTHROID) 75 MCG tablet Take 75 mcg by mouth Daily      NONFORMULARY Liquid Protein bid      aspirin 81 MG chewable tablet Take 1 tablet by mouth daily 30 tablet 3    amLODIPine (NORVASC) 10 MG tablet Take 10 mg by mouth daily      polyethylene glycol (GLYCOLAX) 17 g packet Take 17 g by mouth 2 times daily And PRN      ipratropium-albuterol (DUONEB) 0.5-2.5 (3) MG/3ML SOLN nebulizer solution Inhale 1 vial into the lungs 2 times daily      lidocaine (LIDODERM) 5 % Place 1 patch onto the skin daily Indications: L flank 12 hours on, 12 hours off.  insulin lispro (HUMALOG) 100 UNIT/ML injection vial Inject into the skin 3 times daily (before meals) 0-150 0  151-200 2  201-250 4  251-300 6  301-350 8  351-400  10      oxybutynin (DITROPAN-XL) 10 MG extended release tablet Take 10 mg by mouth daily      magnesium hydroxide (MILK OF MAGNESIA) 400 MG/5ML suspension Take 30 mLs by mouth daily as needed for Constipation      bisacodyl (DULCOLAX) 10 MG suppository Place 10 mg rectally daily      glucose (GLUTOSE) 40 % GEL Take 15 g by mouth as needed      sodium bicarbonate 650 MG tablet Take 1 tablet by mouth 3 times daily 90 tablet 0    atorvastatin (LIPITOR) 40 MG tablet Take 1 tablet by mouth at bedtime 30 tablet 0    carvedilol (COREG) 3.125 MG tablet Take 1 tablet by mouth 2 times daily (with meals) 60 tablet 0    allopurinol (ZYLOPRIM) 100 MG tablet Take 100 mg by mouth daily      acetaminophen (TYLENOL) 325 MG tablet Take 2 tablets by mouth every 6 hours as needed for Pain 120 tablet 3    docusate sodium (COLACE, DULCOLAX) 100 MG CAPS Take 100 mg by mouth 2 times daily as needed for Constipation. 20 capsule 0     No current facility-administered medications for this visit. Past Medical History:   Diagnosis Date    Acute urinary tract infection 3/15/2012    Ataxia 2010    Diabetes mellitus ([address] 2011    type 2, controlled    Fusion of spine of cervical region 10/2012    Gait disturbance 2011    History of prostate cancer 1996    adenocarcinoma    History of tobacco use 1959    Hyperlipidemia 2011    Osteoarthritis 2011       Past Surgical History:   Procedure Laterality Date    BLADDER SURGERY      BLADDER SURGERY Left 3/17/2022    CYSTOSCOPY LEFT STENT EXCHANGE performed by Jake Toney MD at [place] [address]  3/28/13    Cysto with removal of artificial sphinter and placement of suprapubic catheter.    [address] PROSTATECTOMY  1996    infection       Social History of Onset    Cancer Mother     Diabetes Father     Heart Disease Father     High Blood Pressure Father     Diabetes Sister     High Blood Pressure Sister     Cancer Brother         prostate, breast    Diabetes Brother     Cancer Maternal Uncle     Diabetes Maternal Grandmother     Stroke Maternal Grandfather        Vital Signs:  Vitals:    05/25/22 1309   BP: (!) 151/80   Site: Right Lower Arm   Position: Sitting   Cuff Size: Medium Adult   Pulse: 80   Resp: 18   Temp: 97.2 °F (36.2 °C)   TempSrc: Infrared   Weight: 196 lb 6.4 oz (89.1 kg)        Wt Readings from Last 3 Encounters:   05/25/22 196 lb 6.4 oz (89.1 kg)   04/18/22 194 lb (88 kg)   04/14/22 194 lb 10.7 oz (88.3 kg)        Physical Exam:   Gen: alert and NAD  HEENT: sclera clear, pupils equal and reactive, extra ocular muscles intact, oropharynx clear, mucus membranes moist, tympanic membranes clear bilaterally, no cervical lymphadenopathy noted and neck supple  Neck: supple, no significant adenopathy  Chest: clear to auscultation, no wheezes, rales or rhonchi, symmetric air entry  Heart: regular rate and rhythm, no murmurs  ABD: suprapubic cystostomy, abdomen is soft without significant tenderness, masses, organomegaly or guarding.   EXT:peripheral pulses normal, no pedal edema, no clubbing or cyanosis  NEURO: alert, oriented, normal speech, no focal findings or movement disorder noted  Skin: well hydrated, no lesions, surgical site examined  Wounds: none  Labs:   WBC   Date Value Ref Range Status   05/12/2022 6.9 4.0 - 10.5 K/CU MM Final   05/09/2022 7.7 4.0 - 10.5 K/CU MM Final   05/05/2022 7.1 4.0 - 10.5 K/CU MM Final     CREATININE   Date Value Ref Range Status   05/19/2022 1.5 (H) 0.9 - 1.3 MG/DL Final   05/16/2022 1.6 (H) 0.9 - 1.3 MG/DL Final   05/12/2022 1.5 (H) 0.9 - 1.3 MG/DL Final       Cultures:  Culture   Date Value Ref Range Status   05/09/2022 Final Report No growth at 18 to 36 hours  Final   05/02/2022 Final Report No growth at 18 to 36 hours  Final   04/18/2022 Final Report  Final   04/18/2022 SERRATIA MARCESCENS >100,000 CFU/ml (A)  Final       Imaging Studies:         Electronicallysigned by Saji Samaniego MD on 5/25/22 at 8:31 AM EDT

## 2022-05-27 PROBLEM — Z09 HOSPITAL DISCHARGE FOLLOW-UP: Status: ACTIVE | Noted: 2022-05-27

## 2022-06-26 PROBLEM — Z09 HOSPITAL DISCHARGE FOLLOW-UP: Status: RESOLVED | Noted: 2022-05-27 | Resolved: 2022-06-26

## 2022-08-08 ENCOUNTER — HOSPITAL ENCOUNTER (OUTPATIENT)
Age: 82
Discharge: HOME | End: 2022-08-08
Payer: MEDICARE

## 2022-08-08 DIAGNOSIS — N17.9 AKI (ACUTE KIDNEY INJURY) (HCC): ICD-10-CM

## 2022-08-08 LAB
ALBUMIN SERPL-MCNC: 3.9 GM/DL (ref 3.4–5)
ALP BLD-CCNC: 91 IU/L (ref 40–128)
ALT SERPL-CCNC: 13 U/L (ref 10–40)
ANION GAP SERPL CALCULATED.3IONS-SCNC: 11 MMOL/L (ref 4–16)
AST SERPL-CCNC: 13 IU/L (ref 15–37)
BILIRUB SERPL-MCNC: 0.2 MG/DL (ref 0–1)
BUN BLDV-MCNC: 30 MG/DL (ref 6–23)
CALCIUM SERPL-MCNC: 9 MG/DL (ref 8.3–10.6)
CHLORIDE BLD-SCNC: 104 MMOL/L (ref 99–110)
CO2: 24 MMOL/L (ref 21–32)
CREAT SERPL-MCNC: 1.7 MG/DL (ref 0.9–1.3)
GFR AFRICAN AMERICAN: 47 ML/MIN/1.73M2
GFR NON-AFRICAN AMERICAN: 39 ML/MIN/1.73M2
GLUCOSE BLD-MCNC: 262 MG/DL (ref 70–99)
MAGNESIUM: 1.8 MG/DL (ref 1.8–2.4)
PHOSPHORUS: 4.4 MG/DL (ref 2.5–4.9)
POTASSIUM SERPL-SCNC: 5.2 MMOL/L (ref 3.5–5.1)
SODIUM BLD-SCNC: 139 MMOL/L (ref 135–145)
TOTAL PROTEIN: 6.5 GM/DL (ref 6.4–8.2)

## 2022-08-08 PROCEDURE — 84100 ASSAY OF PHOSPHORUS: CPT

## 2022-08-08 PROCEDURE — 80053 COMPREHEN METABOLIC PANEL: CPT

## 2022-08-08 PROCEDURE — 83735 ASSAY OF MAGNESIUM: CPT

## 2022-08-08 PROCEDURE — 36415 COLL VENOUS BLD VENIPUNCTURE: CPT

## 2022-08-28 ENCOUNTER — APPOINTMENT (OUTPATIENT)
Dept: GENERAL RADIOLOGY | Age: 82
DRG: 853 | End: 2022-08-28
Payer: MEDICARE

## 2022-08-28 ENCOUNTER — HOSPITAL ENCOUNTER (INPATIENT)
Age: 82
LOS: 23 days | Discharge: NURSING, REHAB OR LONG-TERM CARE | DRG: 853 | End: 2022-09-21
Attending: STUDENT IN AN ORGANIZED HEALTH CARE EDUCATION/TRAINING PROGRAM | Admitting: STUDENT IN AN ORGANIZED HEALTH CARE EDUCATION/TRAINING PROGRAM
Payer: MEDICARE

## 2022-08-28 DIAGNOSIS — N17.9 ACUTE RENAL FAILURE, UNSPECIFIED ACUTE RENAL FAILURE TYPE (HCC): ICD-10-CM

## 2022-08-28 DIAGNOSIS — A41.9 SEPSIS DUE TO URINARY TRACT INFECTION (HCC): ICD-10-CM

## 2022-08-28 DIAGNOSIS — B49 FUNGEMIA: ICD-10-CM

## 2022-08-28 DIAGNOSIS — T83.510A URINARY TRACT INFECTION ASSOCIATED WITH CYSTOSTOMY CATHETER, INITIAL ENCOUNTER (HCC): Primary | ICD-10-CM

## 2022-08-28 DIAGNOSIS — A41.9 SEPTICEMIA (HCC): ICD-10-CM

## 2022-08-28 DIAGNOSIS — N39.0 URINARY TRACT INFECTION ASSOCIATED WITH CYSTOSTOMY CATHETER, INITIAL ENCOUNTER (HCC): Primary | ICD-10-CM

## 2022-08-28 DIAGNOSIS — N39.0 SEPSIS DUE TO URINARY TRACT INFECTION (HCC): ICD-10-CM

## 2022-08-28 LAB
ABO/RH: NORMAL
ADENOVIRUS DETECTION BY PCR: NOT DETECTED
ALBUMIN SERPL-MCNC: 4.1 GM/DL (ref 3.4–5)
ALP BLD-CCNC: 106 IU/L (ref 40–129)
ALT SERPL-CCNC: 13 U/L (ref 10–40)
ANION GAP SERPL CALCULATED.3IONS-SCNC: 15 MMOL/L (ref 4–16)
ANTIBODY SCREEN: NEGATIVE
AST SERPL-CCNC: 18 IU/L (ref 15–37)
BASOPHILS ABSOLUTE: 0 K/CU MM
BASOPHILS RELATIVE PERCENT: 0.1 % (ref 0–1)
BILIRUB SERPL-MCNC: 0.5 MG/DL (ref 0–1)
BORDETELLA PARAPERTUSSIS BY PCR: NOT DETECTED
BORDETELLA PERTUSSIS PCR: NOT DETECTED
BUN BLDV-MCNC: 36 MG/DL (ref 6–23)
CALCIUM SERPL-MCNC: 9 MG/DL (ref 8.3–10.6)
CHLAMYDOPHILA PNEUMONIA PCR: NOT DETECTED
CHLORIDE BLD-SCNC: 101 MMOL/L (ref 99–110)
CO2: 20 MMOL/L (ref 21–32)
CORONAVIRUS 229E PCR: NOT DETECTED
CORONAVIRUS HKU1 PCR: NOT DETECTED
CORONAVIRUS NL63 PCR: NOT DETECTED
CORONAVIRUS OC43 PCR: NOT DETECTED
CREAT SERPL-MCNC: 1.7 MG/DL (ref 0.9–1.3)
DIFFERENTIAL TYPE: ABNORMAL
EOSINOPHILS ABSOLUTE: 0 K/CU MM
EOSINOPHILS RELATIVE PERCENT: 0.1 % (ref 0–3)
GFR AFRICAN AMERICAN: 47 ML/MIN/1.73M2
GFR NON-AFRICAN AMERICAN: 39 ML/MIN/1.73M2
GLUCOSE BLD-MCNC: 254 MG/DL (ref 70–99)
HCT VFR BLD CALC: 29.6 % (ref 42–52)
HEMOGLOBIN: 9.6 GM/DL (ref 13.5–18)
HUMAN METAPNEUMOVIRUS PCR: NOT DETECTED
IMMATURE NEUTROPHIL %: 0.8 % (ref 0–0.43)
INFLUENZA A BY PCR: NOT DETECTED
INFLUENZA A H1 (2009) PCR: NOT DETECTED
INFLUENZA A H1 PANDEMIC PCR: NOT DETECTED
INFLUENZA A H3 PCR: NOT DETECTED
INFLUENZA B BY PCR: NOT DETECTED
LACTATE: 0.9 MMOL/L (ref 0.4–2)
LYMPHOCYTES ABSOLUTE: 0.6 K/CU MM
LYMPHOCYTES RELATIVE PERCENT: 4 % (ref 24–44)
MCH RBC QN AUTO: 30.7 PG (ref 27–31)
MCHC RBC AUTO-ENTMCNC: 32.4 % (ref 32–36)
MCV RBC AUTO: 94.6 FL (ref 78–100)
MONOCYTES ABSOLUTE: 0.5 K/CU MM
MONOCYTES RELATIVE PERCENT: 2.9 % (ref 0–4)
MYCOPLASMA PNEUMONIAE PCR: NOT DETECTED
NUCLEATED RBC %: 0 %
PARAINFLUENZA 1 PCR: NOT DETECTED
PARAINFLUENZA 2 PCR: NOT DETECTED
PARAINFLUENZA 3 PCR: NOT DETECTED
PARAINFLUENZA 4 PCR: NOT DETECTED
PDW BLD-RTO: 14.9 % (ref 11.7–14.9)
PLATELET # BLD: 196 K/CU MM (ref 140–440)
PMV BLD AUTO: 11.2 FL (ref 7.5–11.1)
POTASSIUM SERPL-SCNC: 5.3 MMOL/L (ref 3.5–5.1)
RBC # BLD: 3.13 M/CU MM (ref 4.6–6.2)
RHINOVIRUS ENTEROVIRUS PCR: NOT DETECTED
RSV PCR: NOT DETECTED
SARS-COV-2: NOT DETECTED
SEGMENTED NEUTROPHILS ABSOLUTE COUNT: 14.5 K/CU MM
SEGMENTED NEUTROPHILS RELATIVE PERCENT: 92.1 % (ref 36–66)
SODIUM BLD-SCNC: 136 MMOL/L (ref 135–145)
TOTAL IMMATURE NEUTOROPHIL: 0.12 K/CU MM
TOTAL NUCLEATED RBC: 0 K/CU MM
TOTAL PROTEIN: 7.5 GM/DL (ref 6.4–8.2)
WBC # BLD: 15.8 K/CU MM (ref 4–10.5)

## 2022-08-28 PROCEDURE — 80053 COMPREHEN METABOLIC PANEL: CPT

## 2022-08-28 PROCEDURE — 99285 EMERGENCY DEPT VISIT HI MDM: CPT

## 2022-08-28 PROCEDURE — 87186 SC STD MICRODIL/AGAR DIL: CPT

## 2022-08-28 PROCEDURE — 51705 CHANGE OF BLADDER TUBE: CPT

## 2022-08-28 PROCEDURE — 96361 HYDRATE IV INFUSION ADD-ON: CPT

## 2022-08-28 PROCEDURE — 6370000000 HC RX 637 (ALT 250 FOR IP): Performed by: PHYSICIAN ASSISTANT

## 2022-08-28 PROCEDURE — 87150 DNA/RNA AMPLIFIED PROBE: CPT

## 2022-08-28 PROCEDURE — 81001 URINALYSIS AUTO W/SCOPE: CPT

## 2022-08-28 PROCEDURE — 6360000002 HC RX W HCPCS: Performed by: PHYSICIAN ASSISTANT

## 2022-08-28 PROCEDURE — 2580000003 HC RX 258: Performed by: PHYSICIAN ASSISTANT

## 2022-08-28 PROCEDURE — 86850 RBC ANTIBODY SCREEN: CPT

## 2022-08-28 PROCEDURE — 96365 THER/PROPH/DIAG IV INF INIT: CPT

## 2022-08-28 PROCEDURE — 86901 BLOOD TYPING SEROLOGIC RH(D): CPT

## 2022-08-28 PROCEDURE — 87040 BLOOD CULTURE FOR BACTERIA: CPT

## 2022-08-28 PROCEDURE — 71045 X-RAY EXAM CHEST 1 VIEW: CPT

## 2022-08-28 PROCEDURE — 85025 COMPLETE CBC W/AUTO DIFF WBC: CPT

## 2022-08-28 PROCEDURE — 86900 BLOOD TYPING SEROLOGIC ABO: CPT

## 2022-08-28 PROCEDURE — 83605 ASSAY OF LACTIC ACID: CPT

## 2022-08-28 PROCEDURE — 76937 US GUIDE VASCULAR ACCESS: CPT

## 2022-08-28 PROCEDURE — 0202U NFCT DS 22 TRGT SARS-COV-2: CPT

## 2022-08-28 RX ORDER — SODIUM CHLORIDE 9 MG/ML
INJECTION, SOLUTION INTRAVENOUS CONTINUOUS
Status: DISCONTINUED | OUTPATIENT
Start: 2022-08-28 | End: 2022-09-06

## 2022-08-28 RX ORDER — ACETAMINOPHEN 500 MG
1000 TABLET ORAL ONCE
Status: COMPLETED | OUTPATIENT
Start: 2022-08-28 | End: 2022-08-28

## 2022-08-28 RX ADMIN — CEFTRIAXONE SODIUM 1000 MG: 1 INJECTION, POWDER, FOR SOLUTION INTRAMUSCULAR; INTRAVENOUS at 20:25

## 2022-08-28 RX ADMIN — ACETAMINOPHEN 1000 MG: 500 TABLET ORAL at 19:06

## 2022-08-28 RX ADMIN — SODIUM CHLORIDE: 9 INJECTION, SOLUTION INTRAVENOUS at 20:17

## 2022-08-28 ASSESSMENT — PAIN - FUNCTIONAL ASSESSMENT
PAIN_FUNCTIONAL_ASSESSMENT: NONE - DENIES PAIN
PAIN_FUNCTIONAL_ASSESSMENT: NONE - DENIES PAIN

## 2022-08-28 NOTE — ED PROVIDER NOTES
Triage Chief Complaint:   Fatigue (General weakness started this am. Family called because pt had changed from baseline. Currently being tx for UTI. Pt is slightly confused, doesn't know the year. He has been N/V. )    [place]:  Today in the ED I had the pleasure of caring for Natalia Virk who is a 78 y.o. male that presents today to the emergency department for generalized weakness. Patient does live at home. States has been feeling ill over the last 1 to 2 days. Had marked weakness. And barely able to ambulate or perform his ADLs. He has had nausea vomiting x1 day. Per family patient has been slightly confused. He was recently diagnosed with urinary tract infection and is currently taking antibiotics (unknown which antibiotic. He endorses fevers and chills denies any abdominal pain head pain headache back pain. He denies any rash. Or shortness of breath or chest pain. ROS:  REVIEW OF SYSTEMS    At least 10 systems reviewed      All other review of systems are negative  See HPI and nursing notes for additional information       Past Medical History:   Diagnosis Date    Acute urinary tract infection 3/15/2012    Ataxia 2010    Diabetes mellitus ([facility] 2011    type 2, controlled    Fusion of spine of cervical region 10/2012    Gait disturbance 2011    History of prostate cancer 1996    adenocarcinoma    History of tobacco use 1959    Hyperlipidemia 2011    Osteoarthritis 2011     Past Surgical History:   Procedure Laterality Date    BLADDER SURGERY      BLADDER SURGERY Left 3/17/2022    CYSTOSCOPY LEFT STENT EXCHANGE performed by Travis Bull MD at [facility]  3/28/13    Cysto with removal of artificial sphinter and placement of suprapubic catheter.     PROSTATE SURGERY      PROSTATECTOMY  1996    infection     Family History   Problem Relation Age of Onset    Cancer Mother     Diabetes Father     Heart Disease Father     High Blood Pressure Father     Diabetes Sister     High Blood Pressure Sister     Cancer Brother         prostate, breast    Diabetes Brother     Cancer Maternal Uncle     Diabetes Maternal Grandmother     Stroke Maternal Grandfather      Social History     Socioeconomic History    Marital status:      Spouse name: Not on file    Number of children: 3    Years of education: Not on file    Highest education level: Not on file   Occupational History    Not on file   Tobacco Use    Smoking status: Former     Packs/day: 0.50     Types: Cigarettes     Quit date: 1976     Years since quittin.1    Smokeless tobacco: Never   Vaping Use    Vaping Use: Not on file   Substance and Sexual Activity    Alcohol use: No     Comment: Quit in     Drug use: No    Sexual activity: Not on file   Other Topics Concern    Not on file   Social History Narrative    Not on file     Social Determinants of Health     Financial Resource Strain: Not on file   Food Insecurity: Not on file   Transportation Needs: Not on file   Physical Activity: Not on file   Stress: Not on file   Social Connections: Not on file   Intimate Partner Violence: Not on file   Housing Stability: Not on file     Current Facility-Administered Medications   Medication Dose Route Frequency Provider Last Rate Last Admin    0.9 % sodium chloride infusion   IntraVENous Continuous Sonya Sarah PA-C 100 mL/hr at 22 2151 Rate Verify at 22 2151     Current Outpatient Medications   Medication Sig Dispense Refill    atorvastatin (LIPITOR) 80 MG tablet take 1 tablet by mouth once daily for HIGH CHOLESTEROL      glimepiride (AMARYL) 4 MG tablet take 1 tablet by mouth every morning for DIABETES MELLTIUS      lisinopril (PRINIVIL;ZESTRIL) 5 MG tablet take 1 tablet by mouth every morning for high blood pressure      pregabalin (LYRICA) 50 MG capsule take 1 capsule by mouth twice a day for NERVE PAIN      torsemide (DEMADEX) 20 MG tablet Take 1 tablet by mouth in the morning.  90 tablet 5 dextromethorphan-guaiFENesin (ROBITUSSIN-DM)  MG/5ML syrup Take 10 mLs by mouth every 4 hours as needed for Cough      Glucagon, rDNA, (GLUCAGON EMERGENCY) 1 MG KIT Inject as directed as needed      loperamide (IMODIUM A-D) 2 MG tablet Take 2 mg by mouth 4 times daily as needed for Diarrhea      insulin glargine (LANTUS) 100 UNIT/ML injection vial Inject 30 Units into the skin daily      Melatonin 10 MG TABS Take 1 tablet by mouth at bedtime      omeprazole (PRILOSEC) 20 MG delayed release capsule Take 40 mg by mouth daily      levothyroxine (SYNTHROID) 75 MCG tablet Take 75 mcg by mouth Daily      NONFORMULARY Liquid Protein bid      aspirin 81 MG chewable tablet Take 1 tablet by mouth daily 30 tablet 3    amLODIPine (NORVASC) 10 MG tablet Take 10 mg by mouth daily      polyethylene glycol (GLYCOLAX) 17 g packet Take 17 g by mouth 2 times daily And PRN      ipratropium-albuterol (DUONEB) 0.5-2.5 (3) MG/3ML SOLN nebulizer solution Inhale 1 vial into the lungs 2 times daily      lidocaine (LIDODERM) 5 % Place 1 patch onto the skin daily Indications: L flank 12 hours on, 12 hours off.      oxybutynin (DITROPAN-XL) 10 MG extended release tablet Take 10 mg by mouth daily      magnesium hydroxide (MILK OF MAGNESIA) 400 MG/5ML suspension Take 30 mLs by mouth daily as needed for Constipation      bisacodyl (DULCOLAX) 10 MG suppository Place 10 mg rectally daily      glucose (GLUTOSE) 40 % GEL Take 15 g by mouth as needed      carvedilol (COREG) 3.125 MG tablet Take 1 tablet by mouth 2 times daily (with meals) 60 tablet 0    allopurinol (ZYLOPRIM) 100 MG tablet Take 100 mg by mouth daily      acetaminophen (TYLENOL) 325 MG tablet Take 2 tablets by mouth every 6 hours as needed for Pain 120 tablet 3    docusate sodium (COLACE, DULCOLAX) 100 MG CAPS Take 100 mg by mouth 2 times daily as needed for Constipation.  20 capsule 0     No Known Allergies    Nursing Notes Reviewed    Physical Exam:  ED Triage Vitals [08/28/22 1817]   Enc Vitals Group      BP (!) 170/69      Heart Rate (!) 110      Resp 16      Temp (!) 102.5 °F (39.2 °C)      Temp Source Oral      SpO2 94 %      Weight       Height       Head Circumference       Peak Flow       Pain Score       Pain Loc       Pain Edu? Excl. in [address]? General :Patient is awake alert oriented person place and time no acute distress fatigued appearing elderly male. HEENT: Pupils are equally round and reactive to light extraocular motors are intact conjunctivae clear sclerae white there is no injection no icterus. Nose without any rhinorrhea or epistaxis. Oral mucosa is moist no exudate buccal mucosa shows no ulcerations. Uvula is midline    Neck: Neck is supple full range of motion trachea midline thyroid nonpalpable  Cardiac: Heart regular rate rhythm no murmurs rubs clicks or gallops  Lungs: Lungs are clear to auscultation there is no wheezing rhonchi or rales. There is no use of accessory muscles no nasal flaring identified. Chest wall: There is no tenderness to palpation over the chest wall or over ribs  Abdomen: Abdomen is soft nontender nondistended. There is no firm or pulsatile masses no rebound rigidity or guarding negative Lima's negative McBurney, no peritoneal signs  Suprapubic:  there is no tenderness to palpation over the external bladder suprapubic catheter in place. Stoma patent. With mild purulence surrounding. Erythema. No crepitus. Musculoskeletal: 5 out of 5 strength in all 4 extremities full flexion extension abduction and adduction supination pronation of all extremities and all digits. No obvious muscle atrophy is noted. No focal muscle deficits are appreciated  Dermatology: Skin is warm and dry there is no obvious abscesses lacerations or lesions noted  Psych: Mentation is grossly normal cognition is grossly normal. Affect is appropriate  Neuro:  Motor intact sensory intact cranial nerves II through XII are intact level of consciousness is normal cerebellar function is normal reflexes are grossly normal. No evidence of incontinence or loss of bowel or bladder no saddle anesthesia noted Lymphatic: There is no submandibular or cervical adenopathy appreciated.         I have reviewed and interpreted all of the currently available lab results from this visit (if applicable):  Results for orders placed or performed during the hospital encounter of 08/28/22   Respiratory Panel, Molecular, with COVID-19 (Restricted: peds pts or suitable admitted adults)    Specimen: Nasopharyngeal   Result Value Ref Range    Adenovirus Detection by PCR NOT DETECTED NOT DETECTED    Coronavirus 229E PCR NOT DETECTED NOT DETECTED    Coronavirus HKU1 PCR NOT DETECTED NOT DETECTED    Coronavirus NL63 PCR NOT DETECTED NOT DETECTED    Coronavirus OC43 PCR NOT DETECTED NOT DETECTED    SARS-CoV-2 NOT DETECTED NOT DETECTED    Human Metapneumovirus PCR NOT DETECTED NOT DETECTED    Rhinovirus Enterovirus PCR NOT DETECTED NOT DETECTED    Influenza A by PCR NOT DETECTED NOT DETECTED    Influenza A H1 Pandemic PCR NOT DETECTED NOT DETECTED    Influenza A H1 (2009) PCR NOT DETECTED NOT DETECTED    Influenza A H3 PCR NOT DETECTED NOT DETECTED    Influenza B by PCR NOT DETECTED NOT DETECTED    Parainfluenza 1 PCR NOT DETECTED NOT DETECTED    Parainfluenza 2 PCR NOT DETECTED NOT DETECTED    Parainfluenza 3 PCR NOT DETECTED NOT DETECTED    Parainfluenza 4 PCR NOT DETECTED NOT DETECTED    RSV PCR NOT DETECTED NOT DETECTED    Bordetella parapertussis by PCR NOT DETECTED NOT DETECTED    B Pertussis by PCR NOT DETECTED NOT DETECTED    Chlamydophila Pneumonia PCR NOT DETECTED NOT DETECTED    Mycoplasma pneumo by PCR NOT DETECTED NOT DETECTED   CBC with Auto Differential   Result Value Ref Range    WBC 15.8 (H) 4.0 - 10.5 K/CU MM    RBC 3.13 (L) 4.6 - 6.2 M/CU MM    Hemoglobin 9.6 (L) 13.5 - 18.0 GM/DL    Hematocrit 29.6 (L) 42 - 52 %    MCV 94.6 78 - 100 FL    MCH 30.7 27 - 31 PG    MCHC 32.4 32.0 - 36.0 % RDW 14.9 11.7 - 14.9 %    Platelets 411 961 - 576 K/CU MM    MPV 11.2 (H) 7.5 - 11.1 FL    Differential Type AUTOMATED DIFFERENTIAL     Segs Relative 92.1 (H) 36 - 66 %    Lymphocytes % 4.0 (L) 24 - 44 %    Monocytes % 2.9 0 - 4 %    Eosinophils % 0.1 0 - 3 %    Basophils % 0.1 0 - 1 %    Segs Absolute 14.5 K/CU MM    Lymphocytes Absolute 0.6 K/CU MM    Monocytes Absolute 0.5 K/CU MM    Eosinophils Absolute 0.0 K/CU MM    Basophils Absolute 0.0 K/CU MM    Nucleated RBC % 0.0 %    Total Nucleated RBC 0.0 K/CU MM    Total Immature Neutrophil 0.12 K/CU MM    Immature Neutrophil % 0.8 (H) 0 - 0.43 %   Comprehensive Metabolic Panel   Result Value Ref Range    Sodium 136 135 - 145 MMOL/L    Potassium 5.3 (H) 3.5 - 5.1 MMOL/L    Chloride 101 99 - 110 mMol/L    CO2 20 (L) 21 - 32 MMOL/L    BUN 36 (H) 6 - 23 MG/DL    Creatinine 1.7 (H) 0.9 - 1.3 MG/DL    Glucose 254 (H) 70 - 99 MG/DL    Calcium 9.0 8.3 - 10.6 MG/DL    Albumin 4.1 3.4 - 5.0 GM/DL    Total Protein 7.5 6.4 - 8.2 GM/DL    Total Bilirubin 0.5 0.0 - 1.0 MG/DL    ALT 13 10 - 40 U/L    AST 18 15 - 37 IU/L    Alkaline Phosphatase 106 40 - 129 IU/L    GFR Non- 39 (L) >60 mL/min/1.73m2    GFR  47 (L) >60 mL/min/1.73m2    Anion Gap 15 4 - 16   Lactic Acid   Result Value Ref Range    Lactate 0.9 0.4 - 2.0 mMOL/L   TYPE AND SCREEN   Result Value Ref Range    ABO/Rh O POSITIVE     Antibody Screen NEGATIVE       Radiographs (if obtained):  [] The following radiograph was interpreted by myself in the absence of a radiologist:   [] Radiologist's Report Reviewed:  XR CHEST PORTABLE   Final Result   Increased interstitial opacity. While much or all of this may be chronic,   please correlate with any clinical evidence of acute interstitial edema. EKG (if obtained):   Please See Note of attending physician for EKG interpretation. Chart review shows recent radiograph(s):  No results found.     MDM:     Interventions given this visit:   Orders Placed This Encounter   Medications    cefTRIAXone (ROCEPHIN) 1,000 mg in dextrose 5 % 50 mL IVPB mini-bag     Order Specific Question:   Antimicrobial Indications     Answer:   Urinary Tract Infection    acetaminophen (TYLENOL) tablet 1,000 mg    0.9 % sodium chloride infusion     Presents today with generalized weakness, fever, tachycardia, leukocytosis sepsis positive. With urinary tract infection failed outpatient therapy. Broad-spectrum antibiotics are initiated here in the ED. IV fluids. He does show leukocytosis no lactic acidosis. Urinalysis pending at this time. Suprapubic catheter replacement is performed by myself. Given patient's sepsis status. Will require admission to the hospital no evidence of septic shock at this time   I independently managed patient today in the ED. /61   Pulse (!) 103   Temp (!) 102.5 °F (39.2 °C) (Oral)   Resp 22   Ht 5' 8\" (1.727 m)   Wt 198 lb (89.8 kg)   SpO2 96%   BMI 30.11 kg/m²       Clinical Impression:  1. Urinary tract infection associated with cystostomy catheter, initial encounter ([facility]    2. Septicemia ([facility]        Disposition referral (if applicable): Nancy Bond MD  [address] [place] [address]  [phone]        Disposition medications (if applicable):  New Prescriptions    No medications on file         Comment: Please note this report has been produced using speech recognition software and may contain errors related to that system including errors in grammar, punctuation, and spelling, as well as words and phrases that may be inappropriate. If there are any questions or concerns please feel free to contact the dictating provider for clarification.       Jeovanny Vásquez, [address] [place] [address]  08/28/22 5907

## 2022-08-28 NOTE — ED TRIAGE NOTES
General weakness started this am. Family called because pt had changed from baseline. Currently being tx for UTI. Pt is slightly confused, doesn't know the year.  He has been N/V.

## 2022-08-29 LAB
ANION GAP SERPL CALCULATED.3IONS-SCNC: 9 MMOL/L (ref 4–16)
BACTERIA: ABNORMAL /HPF
BANDED NEUTROPHILS ABSOLUTE COUNT: 1.23 K/CU MM
BANDED NEUTROPHILS RELATIVE PERCENT: 6 % (ref 5–11)
BILIRUBIN URINE: NEGATIVE MG/DL
BLOOD, URINE: ABNORMAL
BUN BLDV-MCNC: 40 MG/DL (ref 6–23)
CALCIUM OXALATE CRYSTALS: ABNORMAL /HPF
CALCIUM SERPL-MCNC: 8 MG/DL (ref 8.3–10.6)
CHLORIDE BLD-SCNC: 108 MMOL/L (ref 99–110)
CLARITY: ABNORMAL
CO2: 21 MMOL/L (ref 21–32)
COLOR: YELLOW
CREAT SERPL-MCNC: 2 MG/DL (ref 0.9–1.3)
DIFFERENTIAL TYPE: ABNORMAL
ESTIMATED AVERAGE GLUCOSE: 177 MG/DL
GFR AFRICAN AMERICAN: 39 ML/MIN/1.73M2
GFR NON-AFRICAN AMERICAN: 32 ML/MIN/1.73M2
GLUCOSE BLD-MCNC: 181 MG/DL (ref 70–99)
GLUCOSE BLD-MCNC: 194 MG/DL (ref 70–99)
GLUCOSE BLD-MCNC: 206 MG/DL (ref 70–99)
GLUCOSE BLD-MCNC: 206 MG/DL (ref 70–99)
GLUCOSE BLD-MCNC: 221 MG/DL (ref 70–99)
GLUCOSE BLD-MCNC: 225 MG/DL (ref 70–99)
GLUCOSE, URINE: NEGATIVE MG/DL
HBA1C MFR BLD: 7.8 % (ref 4.2–6.3)
HCT VFR BLD CALC: 25.4 % (ref 42–52)
HEMOGLOBIN: 8.1 GM/DL (ref 13.5–18)
KETONES, URINE: ABNORMAL MG/DL
LEUKOCYTE ESTERASE, URINE: ABNORMAL
LYMPHOCYTES ABSOLUTE: 0.8 K/CU MM
LYMPHOCYTES RELATIVE PERCENT: 4 % (ref 24–44)
MCH RBC QN AUTO: 31 PG (ref 27–31)
MCHC RBC AUTO-ENTMCNC: 31.9 % (ref 32–36)
MCV RBC AUTO: 97.3 FL (ref 78–100)
MUCUS: ABNORMAL HPF
NITRITE URINE, QUANTITATIVE: POSITIVE
PDW BLD-RTO: 15.2 % (ref 11.7–14.9)
PH, URINE: 6 (ref 5–8)
PLATELET # BLD: 168 K/CU MM (ref 140–440)
PMV BLD AUTO: 11.3 FL (ref 7.5–11.1)
POTASSIUM SERPL-SCNC: 4.3 MMOL/L (ref 3.5–5.1)
PROTEIN UA: ABNORMAL MG/DL
RBC # BLD: 2.61 M/CU MM (ref 4.6–6.2)
RBC URINE: 335 /HPF (ref 0–3)
SEGMENTED NEUTROPHILS ABSOLUTE COUNT: 18.5 K/CU MM
SEGMENTED NEUTROPHILS RELATIVE PERCENT: 90 % (ref 36–66)
SODIUM BLD-SCNC: 138 MMOL/L (ref 135–145)
SPECIFIC GRAVITY UA: 1.02 (ref 1–1.03)
TRICHOMONAS: ABNORMAL /HPF
UROBILINOGEN, URINE: 0.2 MG/DL (ref 0.2–1)
WBC # BLD: 20.5 K/CU MM (ref 4–10.5)
WBC CLUMP: ABNORMAL /HPF
WBC UA: 2048 /HPF (ref 0–2)

## 2022-08-29 PROCEDURE — 87086 URINE CULTURE/COLONY COUNT: CPT

## 2022-08-29 PROCEDURE — 82962 GLUCOSE BLOOD TEST: CPT

## 2022-08-29 PROCEDURE — 97530 THERAPEUTIC ACTIVITIES: CPT

## 2022-08-29 PROCEDURE — 97162 PT EVAL MOD COMPLEX 30 MIN: CPT

## 2022-08-29 PROCEDURE — 2580000003 HC RX 258: Performed by: STUDENT IN AN ORGANIZED HEALTH CARE EDUCATION/TRAINING PROGRAM

## 2022-08-29 PROCEDURE — 97166 OT EVAL MOD COMPLEX 45 MIN: CPT

## 2022-08-29 PROCEDURE — 6360000002 HC RX W HCPCS: Performed by: STUDENT IN AN ORGANIZED HEALTH CARE EDUCATION/TRAINING PROGRAM

## 2022-08-29 PROCEDURE — 6370000000 HC RX 637 (ALT 250 FOR IP): Performed by: STUDENT IN AN ORGANIZED HEALTH CARE EDUCATION/TRAINING PROGRAM

## 2022-08-29 PROCEDURE — 85027 COMPLETE CBC AUTOMATED: CPT

## 2022-08-29 PROCEDURE — 94761 N-INVAS EAR/PLS OXIMETRY MLT: CPT

## 2022-08-29 PROCEDURE — 1200000000 HC SEMI PRIVATE

## 2022-08-29 PROCEDURE — 80048 BASIC METABOLIC PNL TOTAL CA: CPT

## 2022-08-29 PROCEDURE — 85007 BL SMEAR W/DIFF WBC COUNT: CPT

## 2022-08-29 PROCEDURE — 83036 HEMOGLOBIN GLYCOSYLATED A1C: CPT

## 2022-08-29 PROCEDURE — 36415 COLL VENOUS BLD VENIPUNCTURE: CPT

## 2022-08-29 PROCEDURE — 0T2BX0Z CHANGE DRAINAGE DEVICE IN BLADDER, EXTERNAL APPROACH: ICD-10-PCS | Performed by: INTERNAL MEDICINE

## 2022-08-29 PROCEDURE — 2580000003 HC RX 258: Performed by: PHYSICIAN ASSISTANT

## 2022-08-29 RX ORDER — ONDANSETRON 2 MG/ML
4 INJECTION INTRAMUSCULAR; INTRAVENOUS EVERY 6 HOURS PRN
Status: DISCONTINUED | OUTPATIENT
Start: 2022-08-29 | End: 2022-09-21 | Stop reason: HOSPADM

## 2022-08-29 RX ORDER — ONDANSETRON 4 MG/1
4 TABLET, ORALLY DISINTEGRATING ORAL EVERY 8 HOURS PRN
Status: DISCONTINUED | OUTPATIENT
Start: 2022-08-29 | End: 2022-09-21 | Stop reason: HOSPADM

## 2022-08-29 RX ORDER — ACETAMINOPHEN 325 MG/1
650 TABLET ORAL EVERY 6 HOURS PRN
Status: DISCONTINUED | OUTPATIENT
Start: 2022-08-29 | End: 2022-09-21 | Stop reason: HOSPADM

## 2022-08-29 RX ORDER — SODIUM CHLORIDE 9 MG/ML
INJECTION, SOLUTION INTRAVENOUS PRN
Status: DISCONTINUED | OUTPATIENT
Start: 2022-08-29 | End: 2022-09-19

## 2022-08-29 RX ORDER — IPRATROPIUM BROMIDE AND ALBUTEROL SULFATE 2.5; .5 MG/3ML; MG/3ML
1 SOLUTION RESPIRATORY (INHALATION) 2 TIMES DAILY PRN
Status: DISCONTINUED | OUTPATIENT
Start: 2022-08-29 | End: 2022-09-21 | Stop reason: HOSPADM

## 2022-08-29 RX ORDER — BISACODYL 10 MG
10 SUPPOSITORY, RECTAL RECTAL DAILY
Status: DISCONTINUED | OUTPATIENT
Start: 2022-08-29 | End: 2022-09-11

## 2022-08-29 RX ORDER — MAGNESIUM HYDROXIDE/ALUMINUM HYDROXICE/SIMETHICONE 120; 1200; 1200 MG/30ML; MG/30ML; MG/30ML
30 SUSPENSION ORAL EVERY 6 HOURS PRN
Status: DISCONTINUED | OUTPATIENT
Start: 2022-08-29 | End: 2022-09-21 | Stop reason: HOSPADM

## 2022-08-29 RX ORDER — CHOLECALCIFEROL (VITAMIN D3) 125 MCG
10 CAPSULE ORAL NIGHTLY
Status: DISCONTINUED | OUTPATIENT
Start: 2022-08-29 | End: 2022-09-21 | Stop reason: HOSPADM

## 2022-08-29 RX ORDER — IPRATROPIUM BROMIDE AND ALBUTEROL SULFATE 2.5; .5 MG/3ML; MG/3ML
1 SOLUTION RESPIRATORY (INHALATION) 2 TIMES DAILY
Status: DISCONTINUED | OUTPATIENT
Start: 2022-08-29 | End: 2022-08-29

## 2022-08-29 RX ORDER — INSULIN LISPRO 100 [IU]/ML
0-4 INJECTION, SOLUTION INTRAVENOUS; SUBCUTANEOUS NIGHTLY
Status: DISCONTINUED | OUTPATIENT
Start: 2022-08-29 | End: 2022-09-01

## 2022-08-29 RX ORDER — INSULIN GLARGINE 100 [IU]/ML
0.25 INJECTION, SOLUTION SUBCUTANEOUS NIGHTLY
Status: DISCONTINUED | OUTPATIENT
Start: 2022-08-29 | End: 2022-09-01

## 2022-08-29 RX ORDER — PREGABALIN 50 MG/1
50 CAPSULE ORAL 2 TIMES DAILY
Status: DISCONTINUED | OUTPATIENT
Start: 2022-08-29 | End: 2022-09-10

## 2022-08-29 RX ORDER — ATORVASTATIN CALCIUM 40 MG/1
80 TABLET, FILM COATED ORAL NIGHTLY
Status: DISCONTINUED | OUTPATIENT
Start: 2022-08-29 | End: 2022-09-21 | Stop reason: HOSPADM

## 2022-08-29 RX ORDER — LEVOTHYROXINE SODIUM 0.07 MG/1
75 TABLET ORAL DAILY
Status: DISCONTINUED | OUTPATIENT
Start: 2022-08-29 | End: 2022-09-21 | Stop reason: HOSPADM

## 2022-08-29 RX ORDER — SODIUM CHLORIDE 0.9 % (FLUSH) 0.9 %
5-40 SYRINGE (ML) INJECTION EVERY 12 HOURS SCHEDULED
Status: DISCONTINUED | OUTPATIENT
Start: 2022-08-29 | End: 2022-09-17

## 2022-08-29 RX ORDER — DOCUSATE SODIUM 100 MG/1
100 CAPSULE, LIQUID FILLED ORAL 2 TIMES DAILY
Status: DISCONTINUED | OUTPATIENT
Start: 2022-08-29 | End: 2022-09-11

## 2022-08-29 RX ORDER — SODIUM CHLORIDE 0.9 % (FLUSH) 0.9 %
5-40 SYRINGE (ML) INJECTION PRN
Status: DISCONTINUED | OUTPATIENT
Start: 2022-08-29 | End: 2022-09-21 | Stop reason: HOSPADM

## 2022-08-29 RX ORDER — POLYETHYLENE GLYCOL 3350 17 G/17G
17 POWDER, FOR SOLUTION ORAL 2 TIMES DAILY
Status: DISCONTINUED | OUTPATIENT
Start: 2022-08-29 | End: 2022-09-11

## 2022-08-29 RX ORDER — ASPIRIN 81 MG/1
81 TABLET, CHEWABLE ORAL DAILY
Status: DISCONTINUED | OUTPATIENT
Start: 2022-08-29 | End: 2022-09-21 | Stop reason: HOSPADM

## 2022-08-29 RX ORDER — ACETAMINOPHEN 650 MG/1
650 SUPPOSITORY RECTAL EVERY 6 HOURS PRN
Status: DISCONTINUED | OUTPATIENT
Start: 2022-08-29 | End: 2022-09-21 | Stop reason: HOSPADM

## 2022-08-29 RX ORDER — DEXTROSE MONOHYDRATE 100 MG/ML
INJECTION, SOLUTION INTRAVENOUS CONTINUOUS PRN
Status: DISCONTINUED | OUTPATIENT
Start: 2022-08-29 | End: 2022-09-21 | Stop reason: HOSPADM

## 2022-08-29 RX ORDER — 0.9 % SODIUM CHLORIDE 0.9 %
1000 INTRAVENOUS SOLUTION INTRAVENOUS ONCE
Status: COMPLETED | OUTPATIENT
Start: 2022-08-29 | End: 2022-08-29

## 2022-08-29 RX ORDER — ENOXAPARIN SODIUM 100 MG/ML
40 INJECTION SUBCUTANEOUS EVERY EVENING
Status: DISCONTINUED | OUTPATIENT
Start: 2022-08-30 | End: 2022-09-21

## 2022-08-29 RX ORDER — PANTOPRAZOLE SODIUM 40 MG/1
40 TABLET, DELAYED RELEASE ORAL
Status: DISCONTINUED | OUTPATIENT
Start: 2022-08-29 | End: 2022-09-21 | Stop reason: HOSPADM

## 2022-08-29 RX ORDER — INSULIN LISPRO 100 [IU]/ML
0-4 INJECTION, SOLUTION INTRAVENOUS; SUBCUTANEOUS
Status: DISCONTINUED | OUTPATIENT
Start: 2022-08-29 | End: 2022-09-05

## 2022-08-29 RX ADMIN — DOCUSATE SODIUM 100 MG: 100 CAPSULE, LIQUID FILLED ORAL at 22:12

## 2022-08-29 RX ADMIN — CEFEPIME HYDROCHLORIDE 2000 MG: 2 INJECTION, POWDER, FOR SOLUTION INTRAVENOUS at 15:36

## 2022-08-29 RX ADMIN — SODIUM CHLORIDE: 9 INJECTION, SOLUTION INTRAVENOUS at 06:09

## 2022-08-29 RX ADMIN — ATORVASTATIN CALCIUM 80 MG: 40 TABLET, FILM COATED ORAL at 22:12

## 2022-08-29 RX ADMIN — CEFEPIME HYDROCHLORIDE 2000 MG: 2 INJECTION, POWDER, FOR SOLUTION INTRAVENOUS at 06:10

## 2022-08-29 RX ADMIN — PREGABALIN 50 MG: 50 CAPSULE ORAL at 22:12

## 2022-08-29 RX ADMIN — SODIUM CHLORIDE 1000 ML: 9 INJECTION, SOLUTION INTRAVENOUS at 00:45

## 2022-08-29 RX ADMIN — INSULIN LISPRO 1 UNITS: 100 INJECTION, SOLUTION INTRAVENOUS; SUBCUTANEOUS at 12:56

## 2022-08-29 RX ADMIN — INSULIN GLARGINE 22 UNITS: 100 INJECTION, SOLUTION SUBCUTANEOUS at 03:06

## 2022-08-29 RX ADMIN — LEVOTHYROXINE SODIUM 75 MCG: 75 TABLET ORAL at 06:08

## 2022-08-29 RX ADMIN — POLYETHYLENE GLYCOL (3350) 17 G: 17 POWDER, FOR SOLUTION ORAL at 22:11

## 2022-08-29 RX ADMIN — DOCUSATE SODIUM 100 MG: 100 CAPSULE, LIQUID FILLED ORAL at 08:08

## 2022-08-29 RX ADMIN — POLYETHYLENE GLYCOL (3350) 17 G: 17 POWDER, FOR SOLUTION ORAL at 08:08

## 2022-08-29 RX ADMIN — INSULIN GLARGINE 22 UNITS: 100 INJECTION, SOLUTION SUBCUTANEOUS at 22:21

## 2022-08-29 RX ADMIN — PREGABALIN 50 MG: 50 CAPSULE ORAL at 08:08

## 2022-08-29 RX ADMIN — PANTOPRAZOLE SODIUM 40 MG: 40 TABLET, DELAYED RELEASE ORAL at 06:08

## 2022-08-29 RX ADMIN — SODIUM CHLORIDE: 9 INJECTION, SOLUTION INTRAVENOUS at 15:38

## 2022-08-29 RX ADMIN — Medication 10 MG: at 22:12

## 2022-08-29 RX ADMIN — ASPIRIN 81 MG CHEWABLE TABLET 81 MG: 81 TABLET CHEWABLE at 08:08

## 2022-08-29 RX ADMIN — BISACODYL 10 MG: 10 SUPPOSITORY RECTAL at 14:15

## 2022-08-29 RX ADMIN — Medication 10 MG: at 03:05

## 2022-08-29 ASSESSMENT — PAIN SCALES - GENERAL
PAINLEVEL_OUTOF10: 0
PAINLEVEL_OUTOF10: 0
PAINLEVEL_OUTOF10: 2

## 2022-08-29 ASSESSMENT — PAIN DESCRIPTION - FREQUENCY: FREQUENCY: CONTINUOUS

## 2022-08-29 ASSESSMENT — PAIN DESCRIPTION - LOCATION: LOCATION: RECTUM

## 2022-08-29 ASSESSMENT — PAIN DESCRIPTION - DESCRIPTORS: DESCRIPTORS: DISCOMFORT

## 2022-08-29 ASSESSMENT — PAIN DESCRIPTION - PAIN TYPE: TYPE: ACUTE PAIN

## 2022-08-29 NOTE — CONSULTS
Consult completed. Nexiva 20g 1.75 inch catheter inserted via ultrasound in patient's LFA. Brisk blood return noted and catheter flushes with ease. Ordered blood work obtained and placed @ bedside. Sudhir Cross RN notified. Patient tolerated well. Consult IV/PICC team for any questions or if patient's needs change.

## 2022-08-29 NOTE — PROGRESS NOTES
Subjective: Patient is an 64-year-old male with history of prostate cancer, diabetes mellitus, essential hypertension who was admitted with fatigue overnight        Vitals: Tmax 102.5, heart rate 84, blood pressure 127/57, on room air      Home medications: Lipitor, glimepiride, lisinopril, Lyrica, torsemide, Lantus, levothyroxine, aspirin, amlodipine, Coreg, allopurinol      Current Medications: Aspirin, Lipitor, cefepime, docusate, Lovenox, Lantus, sliding scale insulin, levothyroxine, pantoprazole, Lyrica, normal saline 800 mm/h      Labs: Labs from overnight reviewed, potassium 5.3, creatinine 1.7  WBC 15.8, hemoglobin globin 9.6, platelets 493  Respiratory panel negative  UA positive for cystitis    Imaging: Chest x-ray shows increased interstitial opacity which could be chronic      Plan:   Sepsis secondary to acute cystitis without hematuria  Bacteremia   History of prostate cancer status post prostatectomy 1996  Diabetes mellitus type  Essential hypertension      Admitted with with fatigue, UA positive for cystitis, temp spike on admission, leukocytosis  Previous history of Serratia marcescens (ceftriaxone is usually concern for amp C mutation and developing resistance)  Continue cefepime   Previous culture sensitive to cefepime   Repeat blood culture in am   Blood culture positive today   We will continue to monitor may need carbapenems    Lisinopril and amlodipine held secondary to hypotension  Continue sliding-scale insulin diabetic diet      Patient admitted this morning we will continue to follow from tomorrow onwards

## 2022-08-29 NOTE — ACP (ADVANCE CARE PLANNING)
Patient does not have any ACP documents/Medical Power of . LSW notes hospital will follow [place]'s Next of Kin hierarchy in the following descending order for priority:    Guardian  Spouse  [de-identified] of adult Children  Parents  [de-identified] of adult Siblings  Nearest Relative not described above    Per [place]'s Next of Kin hierarchy: All three of patients' children will equally be [address]

## 2022-08-29 NOTE — PROGRESS NOTES
[address] [facility], 1940, [id number]-, 8/29/2022    Discharge Recommendation: ARU vs SNF pending progress    History:  [place]:  The primary encounter diagnosis was Urinary tract infection associated with cystostomy catheter, initial encounter ([facility] [address] A diagnosis of Septicemia ([facility] [address] was also pertinent to this visit. Subjective:  Patient states: \"I got 3 boys\"  Pain: pt acknowledges pain in his \"frame\" (back?), doesn't rate  Communication with other providers: coeval with PT Angelica  Restrictions: general precautions, fall risk, cog, contact precautions  Son (Demertio) at bedside    Home Setup/Prior level of function:  Social/Functional History  Lives With: Son  Type of Home: House  Home Layout: One level  Home Access: Ramped entrance  Bathroom Shower/Tub: Tub/Shower unit,Shower chair with back  Bathroom Toilet: Standard  Bathroom Equipment: Grab bars in shower,Shower chair  Bathroom Accessibility: Radhika Baez accessible (states once he is in bathroom he uses countertops and grab bars, but pt not able to clarify if this is because there is little room or just preference)  Home Equipment: [address] walker (pt thinks there may be a BSC from his wife in storage, but unsure)  ADL Assistance: Independent  Homemaking Assistance: Independent  Ambulation Assistance: Independent (mod I with 4ww)  Transfer Assistance: Independent  Active : No  Patient's  Info: 3 sons assist in driving. Mode of Transportation: Car,SUV  Occupation: Retired  Type of occupation: Retired from [address] [place] [address] Street: TV, very little reading, no pets, doctors, son manages groceries,  Meds are managed via pill dispenser that son manages. Pt occasionally writes checks and some auto pay  Additional Comments: pt sleeps in flat bed. Pt has fallen >4 times in past year. Pt attributes many to low BS.  No injury  *Pt was with recent admission March 2022, was d/c to ARU, and then to [facility]. Info taken from ARU stay. Spoke briefly with son, states that pt was initially doing much better around home (able to dress self, ambulating mod I using rollator), however has had reoccurring UTIs that tend to make independence fluctuate. Pt son also states [facility] therapy services (PT and OT) and that pt's other son is present at all times through the day and on disability himself. Examination:  Observation: Pt was supine in bed asleep upon arrival, agreeable to session. Lethargic, inc swelling to RLE. Vision: [facility]  Hearing: WFL  Objective Measures: stable    Body Systems and functions:  ROM: PROM WFL, pt demo AROM of R elbow WFL (only pt directed movements observed)  Strength: grossly 4-/5 in BUE, unable to formally assess  Sensation: WFL  Tone: normotonic in BUE  Coordination: grossly impaired  Activity Tolerance: poor    Activities of Daily Living (ADLs):  Feeding: mod A  Grooming: max A  Toileting: dep  UB dressing/bathing: max A  LB dressing/bathing: dep    *pt ADL function inferred from gross functional assessment of mobility, balance, posture, safety awareness, activity tolerance (unless otherwise indicated)    Cognitive and Psychosocial Functioning:  Overall cognitive status: Pt very lethargic this session, consistently falling asleep, requiring frequent cueing for keeping eyes open. Oriented to name and place but unable to answer rest of orientation questions. Very limited active participation, following 0/5 requests today. Affect: lethargic    Functional Mobility:  Bed Mobility: max Ax2 supine <>sit  Sit <> Stand: DNT d/t safety    Ambulation: DNT    AM-PAC 6 click short form for inpatient daily activity:   How much help from another person does the patient currently need. .. Unable  Dep A Lot  Max A A Lot   Mod A A Little  Min A A Little   CGA  SBA None   Mod I  Indep  Sup   1.   Putting on and taking off regular lower body clothing? [x] 1    [] 2   [] 2   [] 3   [] 3   [] 4      2. Bathing (including washing, rinsing, drying)? [x] 1   [] 2   [] 2 [] 3 [] 3 [] 4   3. Toileting, which includes using toilet, bedpan, or urinal? [x] 1    [] 2   [] 2   [] 3   [] 3   [] 4     4. Putting on and taking off regular upper body clothing? [] 1   [x] 2   [] 2   [] 3   [] 3    [] 4      5. Taking care of personal grooming such as brushing teeth? [] 1   [x] 2    [] 2 [] 3    [] 3   [] 4      6. Eating meals? [] 1   [] 2   [x] 2   [] 3   [] 3   [] 4        Raw Score:  9      24/24 = unimpaired  23/24 = 1-20% impaired   20/24-22/24 = 21-40% impaired  15/24-19/24 = 41-59% impaired   10/24-14/24 = 60%-79% impaired  7/24-9/24 = 80%-99% impaired  6/24 = 100% impaired     Treatment:    Therapeutic Activity Training (10 minutes): Therapeutic activity training was instructed today. Cues were given for safety, sequence, UE/LE placement, visual cues, and balance. Activities performed today included pt demo supine to sit EOB with max Ax2. Pt at EOB, very lethargic requiring min-mod A to maintain sitting EOB balance. Pt very sweaty, participating in UB bathing to wash sweat off with max A. Pt able to maintain sitting EOB for several minutes, returning to supine with max Ax2. Pt scooting to HOB with max Ax2. Pt positioned for comfort, edu pt son on therapy POC and discharge disposition. Consulted with CM about pt discharge recs. Education: Role of OT, OT POC, discharge needs, safety, benefits of EOB/OOB activity, AD/DME needs, Home safety  Safety Measures: Gait belt used for safety of pt and therapist, Left in supine, Alarm in place, call light and phone within reach, lines managed    Assessment:  Pt is a 78 yr old male from home with son who presents with complicated UTI. Prior to admission, pt was recently mod I with mobility using rollator, demo some ADLs independently but needing assist occassionally.  Pt currently well below baseline, limited

## 2022-08-29 NOTE — CARE COORDINATION
Reviewed chart for continued discharge planning- therapy rec'd SNF vs ARU, son would prefer ARU, referral called to [facility] to review.

## 2022-08-29 NOTE — ED NOTES
Report given to [facility] and care transferred at this time     Janina Varghese, ISHMAEL  08/28/22 2031

## 2022-08-29 NOTE — ED NOTES
Patient and belongings to floor at this time. Patient alert and talkative.      Andreas Love RN  08/29/22 4889

## 2022-08-29 NOTE — CARE COORDINATION
Per PT/OT eval notes patient lethargic throughout evalutation. Patient is not ARU appropriate at this time. ARU will follow for potential progress with therapy.

## 2022-08-29 NOTE — H&P
History and Physical      Name:  Janina Ruiz /Age/Sex: 1940  (78 y.o. male)   MRN & CSN:  [id number] & [id number] Encounter Date/Time: 2022 2:24 AM EDT   Location:  [facility]/1766-D PCP: Chet House MD       Hospital Day: 2    Assessment and Plan:     Patient is a 49-year-old male who presented with fatigue for few days. # Sepsis secondary to UTI  - Presented with fatigue for few days. Allegedly being treated for UTI (unknown antibiotic). Has [facility] for chronic urinary retention, last exchanged in 2022 (?). Admitted in 2022 for left HN, plan for SBP exchange every 3 months. UCx then grew Serratia marcescens which was treated with cefepime. - Febrile, tachycardic. Moderate leukocytosis. LA normal. UA with many bacteria, WBCs and RBCs. CXR negative for acute cardiopulmonary issues. - SPC exchanged to the ED, had purulent output. Started on IVF (limited to prevent volume overload) and Rocephin, will switch to Cefepime (Serratia marcescens is usually resistant to Rocephin). - BCx and UCx pending.  - Supportive care with IVF, follow-up cultures. # Hx of prostate cancer s/p RPP in   - PSA 0.93 in 2021.  - On Eligard q6m. # T2DM  - Last A1c 8.6% in 2022, repeat pending.  - Decreased home Insulin to Lantus 22 u qhs with LCSI. # Essential hypertension  - Held Lisinopril, Coreg and Norvasc in the setting of sepsis. Checklist:  Advanced directive: full  Antibiotics: as above  Catheter: SBC  DVT ppx: Lovenox  Sugar: BG goal of 140-180 while inpatient    Disposition: patient requires continued admission due to sepsis secondary to UTI. MDM: moderate. History of Present Illness:     Chief Complaint: Fatigue    Patient is a 49-year-old male with a PMHx of HTN, HLD, T2DM, prostate cancer s/p RPP in  and suprapubic catheter who presented to the ED due to fatigue. History was very limited as patient unable to provide significant amount of details.   Per chart review, patient was recently treated for UTI (unknown antibiotic) and has been having mild confusion at home. Patient lives at home and is able to perform ADLs independently. Family is concerned about repeat UTIs as he has similar presentations back in 4/2022. Denied any SOB, CP, abdominal pain or diarrhea. ROS:     Ten point ROS reviewed negative, unless as noted above. Objective: Intake/Output Summary (Last 24 hours) at 8/29/2022 0224  Last data filed at 8/29/2022 0205  Gross per 24 hour   Intake 1216.8 ml   Output 650 ml   Net 566.8 ml        Vitals:   Vitals:    08/28/22 2354 08/28/22 2356 08/29/22 0202 08/29/22 0215   BP: (!) 101/56  (!) 129/58 (!) 143/56   Pulse: 90 90 89 94   Resp: 18  15 16   Temp: 98.4 °F (36.9 °C)  98.2 °F (36.8 °C) 98.2 °F (36.8 °C)   TempSrc: Oral  Oral    SpO2: 98%   98%   Weight:       Height:         BMI: Body mass index is 30.11 kg/m². General: AAOx3, incorrect year. HEENT: NCAT. Dry MM. Neck: Supple. No JVP/JVD. CV: RRR. NL S1/S2.  2/6 SM. CR <2 secs. No peripheral edema. Pulm: NL effort on RA. CTAB. No R/R/W. CW NTTP. GI: +BS x4. Soft. NT/ND. : Mild suprapubic tenderness, no CVA tenderness. Clean SPC (s/p exchange) with clear urine output. Ext: No obvious deformities. Peripheral pulses intact. Skin: Intact. Normal coloration, warm, dry. MSK: No gross joint deformities. Full ROM. Psych: Pleasant. 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 PROSTATE SURGERY      PROSTATECTOMY      infection       Allergies: No Known Allergies    FHx: family history includes Cancer in his brother, maternal uncle, and mother; Diabetes in his brother, father, maternal grandmother, and sister; Heart Disease in his father; High Blood Pressure in his father and sister; Stroke in his maternal grandfather. SHx:   Social History     Socioeconomic History    Marital status:      Spouse name: None    Number of children: 3    Years of education: None    Highest education level: None   Tobacco Use    Smoking status: Former     Packs/day: 0.50     Types: Cigarettes     Quit date: 1976     Years since quittin.1    Smokeless tobacco: Never   Substance and Sexual Activity    Alcohol use: No     Comment: Quit in     Drug use: No       Medications Prior to Admission     Prior to Admission medications    Medication Sig Start Date End Date Taking? Authorizing Provider   atorvastatin (LIPITOR) 80 MG tablet take 1 tablet by mouth once daily for HIGH CHOLESTEROL 22   Historical Provider, MD   glimepiride (AMARYL) 4 MG tablet take 1 tablet by mouth every morning for DIABETES MELLTIUS 22   Historical Provider, MD   lisinopril (PRINIVIL;ZESTRIL) 5 MG tablet take 1 tablet by mouth every morning for high blood pressure 22   Historical Provider, MD   pregabalin (LYRICA) 50 MG capsule take 1 capsule by mouth twice a day for NERVE PAIN 22   Historical Provider, MD   torsemide (DEMADEX) 20 MG tablet Take 1 tablet by mouth in the morning.  22   Kat Marin MD   dextromethorphan-guaiFENesin (ROBITUSSIN-DM)  MG/5ML syrup Take 10 mLs by mouth every 4 hours as needed for Cough    Historical Provider, MD   Glucagon rDNA, (GLUCAGON EMERGENCY) 1 MG KIT Inject as directed as needed    Historical docusate sodium (COLACE, DULCOLAX) 100 MG CAPS Take 100 mg by mouth 2 times daily as needed for Constipation. 12/14/14   Mitch Hsu MD       Data:     CBC:   Recent Labs     08/28/22 1920   WBC 15.8*   HGB 9.6*      MCV 94.6   RDW 14.9   LYMPHOPCT 4.0*   MONOPCT 2.9   BASOPCT 0.1   MONOSABS 0.5   LYMPHSABS 0.6   EOSABS 0.0   BASOSABS 0.0     CMP:    Recent Labs     08/28/22 1920      K 5.3*      CO2 20*   BUN 36*   CREATININE 1.7*   GFRAA 47*   GLUCOSE 254*   LABALBU 4.1   CALCIUM 9.0   BILITOT 0.5   ALKPHOS 106   AST 18   ALT 13     Lipids: No results found for: CHOL, HDL, TRIG  Hemoglobin A1C:   Lab Results   Component Value Date/Time    LABA1C 8.6 04/28/2022 06:59 AM     TSH: No results found for: TSH  Troponin:   Lab Results   Component Value Date/Time    TROPONINT 0.030 04/20/2022 07:30 AM    TROPONINT 0.040 04/18/2022 09:51 AM    TROPONINT 0.028 03/25/2022 04:24 PM     BNP: No results for input(s): PROBNP in the last 72 hours. Lactic Acid: No results for input(s): LACTA in the last 72 hours.   UA:  Lab Results   Component Value Date/Time    NITRU POSITIVE 08/28/2022 11:20 PM    NITRU NEGATIVE 03/23/2013 02:24 PM    COLORU YELLOW 08/28/2022 11:20 PM    WBCUA 2048 08/28/2022 11:20 PM    RBCUA 335 08/28/2022 11:20 PM    MUCUS RARE 08/28/2022 11:20 PM    TRICHOMONAS NONE SEEN 08/28/2022 11:20 PM    YEAST FEW 04/06/2022 04:45 PM    BACTERIA MANY 08/28/2022 11:20 PM    CLARITYU CLOUDY 08/28/2022 11:20 PM    SPECGRAV 1.020 08/28/2022 11:20 PM    LEUKOCYTESUR LARGE 08/28/2022 11:20 PM    UROBILINOGEN 0.2 08/28/2022 11:20 PM    BILIRUBINUR NEGATIVE 08/28/2022 11:20 PM    BLOODU LARGE 08/28/2022 11:20 PM    GLUCOSEU 1,000 03/23/2013 02:24 PM    KETUA 15 MG/DL 08/28/2022 11:20 PM    AMORPHOUS RARE 11/16/2021 12:28 PM     Urine Cultures: No results found for: LABURIN  Blood Cultures: No results found for: BC  No results found for: BLOODCULT2  Organism:   Lab Results   Component Value Date/Time [facility] 02/11/2018 04:35 AM       Radiology results:  XR CHEST PORTABLE   Final Result   Increased interstitial opacity. While much or all of this may be chronic,   please correlate with any clinical evidence of acute interstitial edema.              Medications:     Medications:    sodium chloride  1,000 mL IntraVENous Once    cefTRIAXone (ROCEPHIN) IV  1,000 mg IntraVENous Q24H    insulin glargine  0.25 Units/kg SubCUTAneous Nightly    insulin lispro  0-4 Units SubCUTAneous TID WC    insulin lispro  0-4 Units SubCUTAneous Nightly        Infusions:    dextrose      sodium chloride 100 mL/hr at 08/29/22 0205       PRN Meds:   glucose, 4 tablet, PRN  dextrose bolus, 125 mL, PRN   Or  dextrose bolus, 250 mL, PRN  glucagon (rDNA), 1 mg, PRN  dextrose, , Continuous PRN        Velma Caal MD  08/29/22 2:24 AM

## 2022-08-29 NOTE — PROGRESS NOTES
Physical Therapy  [facility] ACUTE CARE PHYSICAL THERAPY EVALUATION  Abby Mckee, 1940, [id number], 8/29/2022    History  [place]:  The primary encounter diagnosis was Urinary tract infection associated with cystostomy catheter, initial encounter ([facility] [address] A diagnosis of Septicemia ([facility] [address] was also pertinent to this visit. Patient  has a past medical history of Acute urinary tract infection, Ataxia, Diabetes mellitus ([address] Fusion of spine of cervical region, Gait disturbance, History of prostate cancer, History of tobacco use, Hyperlipidemia, and Osteoarthritis. Patient  has a past surgical history that includes Prostate surgery; other surgical history (3/28/13); Colon surgery; Bladder surgery; Prostatectomy (1996); and Bladder surgery (Left, 3/17/2022). Subjective:  Patient states: Very minimal conversation due to significant lethargy. Amendable to therapy trial   Pain:  \"My frame\" referring to his back, did not rate  Communication with other providers:  co-eval with Namita REZA   Restrictions: contact precautions     Home Setup/Prior level of function  Social/Functional History  Lives With: Son (on disability but can physically help pt some)  Type of Home: House  Home Layout: One level  Home Access: Ramped entrance  Bathroom Shower/Tub: Tub/Shower unit,Shower chair with back  Bathroom Toilet: Standard  Bathroom Equipment: Grab bars in [facility] chair  Home Equipment: [address]er   ADL Assistance: Independent  Homemaking Assistance: Needs assistance   Homemaking Responsibilities: Yes  Ambulation Assistance: Independent (mod I with 4ww for up to 150'. pt used electronic carts at stores when able)  Transfer Assistance: Independent  Active : No  Patient's  Info: 3 sons assist in driving.   Mode of Transportation: Car,SUV  Occupation: Retired  Type of occupation: Retired from [address] [place] [address] Street: TV, very little reading, no pets, doctors, son manages Jackye Michelle are managed via pill dispenser that son manages. Pt occasionally writes checks and some auto pay  Additional Comments: [facility] for PT/OT and nursing. He has had increased assistance with bathing and medication management   *Above information taken from a prior eval and confirmed with son     Examination of body systems (includes body structures/functions, activity/participation limitations):  Observation:  Supine in bed upon arrival. Son visiting. Pt is very lethargic with dec active participation. Inc swelling to right LE   Vision:  WFL  Hearing:  CCS Holding  Cardiopulmonary:  stable vitals on room air   Orientation: Oriented to person and place. Unable to stay aroused when answering date. Musculoskeletal  ROM R/L:  [facility] BLEs passive ROM   Strength R/L:  unable to formally assess due to lethargy and not following cues. Significant weakness observed in function and endurance. Mobility/treatment:   Rolling L/R:  to the left maxA   Supine to sit:  maxA x 2 for bilat LE advancement and uprighting trunk. Facilitated hand to bed rail. Pt was able to grasp but not affectively use to upright trunk   Sit to supine: maxA x 2 for bilat LE advancement and trunk guidance   Transfers: not safe to assess at this time   Sitting balance:  min-modA x ~6 minutes at EOB. Static with BUE support. LOB posterior that appeared to be mostly related to tiredness and low arousal.   Standing balance: no safe to assess at this time   Gait: not safe to assess at this time   Educated pt and son on POC, role of PT, discharge. Cues provided to inc safety and indep with mobility     [facility] 6 Clicks Inpatient Mobility:  AM-PAC Inpatient Mobility Raw Score : 8    Safety: patient left in bed, call light within reach     Assessment:  Pt is an 78year old male admitted with significant fatigue and inability to complete self care tasks/household mobility. Diagnosed with sepsis due to UTI.  Pt was very limited today with significant lethargy and inability to sustain arousal. Per son, the pt is typically an active participant in therapy and has done \"well\" in ARU in the past. Will continue to monitor progress of pts tolerance/participation while admitted. He performed well below his baseline this date with dec strength, balance, and activity tolerance. He would benefit from further therapy services once medically stable prior to d/c home to address his current deficits, dec potential fall risk, and restore function.    Complexity: Moderate  Prognosis: Good/fair   Plan: 3-5 times per week  Discharge Recommendations: IP Rehab, [address]  Equipment: continue to assess     Goals:  Short Term Goals  Time Frame for Short term goals: 2 weeks  Short term goal 1: Pt will perform sit><supine maxA  Short term goal 2: Pt will perform static sitting x 5 minutes SBA with BUE support  Short term goal 3: Pt will transfer between surfaces maxA       Treatment plan:  Bed mobility, transfers, balance, gait, TA, TX, WC     Recommendations for NURSING mobility: 2 person for bed mobility     Time:   Time in: 1138  Time out: 1200  Timed treatment minutes: 8  Total time: 22 minutes     Electronically signed by:    Lesley Rios, [id number], 2:19 PM

## 2022-08-29 NOTE — DISCHARGE INSTRUCTIONS
Weekly labs drawn on Monday during the course of treatment  CBC with differential, CMP, ESR, CRP  Fax results to Attn: Nirali Solomon Infectious Diseases Staff  # [phone]

## 2022-08-29 NOTE — PROGRESS NOTES
4 Eyes Skin Assessment     NAME:  Kimberly Das  YOB: 1940  MEDICAL RECORD NUMBER:  [id number]    The patient is being assess for  Admission    I agree that 2 RN's have performed a thorough Head to Toe Skin Assessment on the patient. ALL assessment sites listed below have been assessed. Areas assessed by both nurses:    Head, Face, Ears, Shoulders, Back, Chest, Arms, Elbows, Hands, Sacrum. Buttock, Coccyx, Ischium, and Legs. Feet and Heels        Does the Patient have a Wound?  No noted wound(s)       Judd Prevention initiated:  Yes   Wound Care Orders initiated:  No    Pressure Injury (Stage 3,4, Unstageable, DTI, NWPT, and Complex wounds) if present place referral/consult order under [de-identified] No    New and Established Ostomies if present place consult order under : No      Nurse 1 eSignature: Electronically signed by Stephanie Jaime RN on 8/29/22 at 3:11 AM EDT    **SHARE this note so that the co-signing nurse is able to place an eSignature**    Nurse 2 eSignature: Electronically signed by Gladys Martinez LPN on 4/55/93 at 8:97 AM EDT

## 2022-08-30 LAB
ANION GAP SERPL CALCULATED.3IONS-SCNC: 9 MMOL/L (ref 4–16)
BUN BLDV-MCNC: 35 MG/DL (ref 6–23)
CALCIUM SERPL-MCNC: 8.2 MG/DL (ref 8.3–10.6)
CHLORIDE BLD-SCNC: 109 MMOL/L (ref 99–110)
CO2: 21 MMOL/L (ref 21–32)
CREAT SERPL-MCNC: 1.8 MG/DL (ref 0.9–1.3)
CULTURE: NORMAL
GFR AFRICAN AMERICAN: 44 ML/MIN/1.73M2
GFR NON-AFRICAN AMERICAN: 36 ML/MIN/1.73M2
GLUCOSE BLD-MCNC: 129 MG/DL (ref 70–99)
GLUCOSE BLD-MCNC: 134 MG/DL (ref 70–99)
GLUCOSE BLD-MCNC: 138 MG/DL (ref 70–99)
GLUCOSE BLD-MCNC: 184 MG/DL (ref 70–99)
GLUCOSE BLD-MCNC: 245 MG/DL (ref 70–99)
HCT VFR BLD CALC: 26.2 % (ref 42–52)
HEMOGLOBIN: 8.1 GM/DL (ref 13.5–18)
Lab: NORMAL
MCH RBC QN AUTO: 30.2 PG (ref 27–31)
MCHC RBC AUTO-ENTMCNC: 30.9 % (ref 32–36)
MCV RBC AUTO: 97.8 FL (ref 78–100)
PDW BLD-RTO: 15.2 % (ref 11.7–14.9)
PLATELET # BLD: 168 K/CU MM (ref 140–440)
PMV BLD AUTO: 11.7 FL (ref 7.5–11.1)
POTASSIUM SERPL-SCNC: 4.3 MMOL/L (ref 3.5–5.1)
RBC # BLD: 2.68 M/CU MM (ref 4.6–6.2)
SODIUM BLD-SCNC: 139 MMOL/L (ref 135–145)
SPECIMEN: NORMAL
WBC # BLD: 13.7 K/CU MM (ref 4–10.5)

## 2022-08-30 PROCEDURE — 94761 N-INVAS EAR/PLS OXIMETRY MLT: CPT

## 2022-08-30 PROCEDURE — 87040 BLOOD CULTURE FOR BACTERIA: CPT

## 2022-08-30 PROCEDURE — 36415 COLL VENOUS BLD VENIPUNCTURE: CPT

## 2022-08-30 PROCEDURE — 6370000000 HC RX 637 (ALT 250 FOR IP): Performed by: HOSPITALIST

## 2022-08-30 PROCEDURE — 6370000000 HC RX 637 (ALT 250 FOR IP): Performed by: STUDENT IN AN ORGANIZED HEALTH CARE EDUCATION/TRAINING PROGRAM

## 2022-08-30 PROCEDURE — 6360000002 HC RX W HCPCS: Performed by: STUDENT IN AN ORGANIZED HEALTH CARE EDUCATION/TRAINING PROGRAM

## 2022-08-30 PROCEDURE — 97116 GAIT TRAINING THERAPY: CPT

## 2022-08-30 PROCEDURE — 82962 GLUCOSE BLOOD TEST: CPT

## 2022-08-30 PROCEDURE — 97530 THERAPEUTIC ACTIVITIES: CPT

## 2022-08-30 PROCEDURE — 2580000003 HC RX 258: Performed by: PHYSICIAN ASSISTANT

## 2022-08-30 PROCEDURE — 97168 OT RE-EVAL EST PLAN CARE: CPT

## 2022-08-30 PROCEDURE — 85027 COMPLETE CBC AUTOMATED: CPT

## 2022-08-30 PROCEDURE — 80048 BASIC METABOLIC PNL TOTAL CA: CPT

## 2022-08-30 PROCEDURE — 2580000003 HC RX 258: Performed by: STUDENT IN AN ORGANIZED HEALTH CARE EDUCATION/TRAINING PROGRAM

## 2022-08-30 PROCEDURE — 1200000000 HC SEMI PRIVATE

## 2022-08-30 RX ORDER — AMLODIPINE BESYLATE 10 MG/1
10 TABLET ORAL DAILY
Status: DISCONTINUED | OUTPATIENT
Start: 2022-08-30 | End: 2022-09-21 | Stop reason: HOSPADM

## 2022-08-30 RX ADMIN — POLYETHYLENE GLYCOL (3350) 17 G: 17 POWDER, FOR SOLUTION ORAL at 09:21

## 2022-08-30 RX ADMIN — DOCUSATE SODIUM 100 MG: 100 CAPSULE, LIQUID FILLED ORAL at 20:47

## 2022-08-30 RX ADMIN — ACETAMINOPHEN 650 MG: 325 TABLET ORAL at 20:54

## 2022-08-30 RX ADMIN — PREGABALIN 50 MG: 50 CAPSULE ORAL at 20:47

## 2022-08-30 RX ADMIN — PREGABALIN 50 MG: 50 CAPSULE ORAL at 09:21

## 2022-08-30 RX ADMIN — DOCUSATE SODIUM 100 MG: 100 CAPSULE, LIQUID FILLED ORAL at 09:21

## 2022-08-30 RX ADMIN — CEFEPIME HYDROCHLORIDE 2000 MG: 2 INJECTION, POWDER, FOR SOLUTION INTRAVENOUS at 03:42

## 2022-08-30 RX ADMIN — POLYETHYLENE GLYCOL (3350) 17 G: 17 POWDER, FOR SOLUTION ORAL at 20:47

## 2022-08-30 RX ADMIN — INSULIN GLARGINE 22 UNITS: 100 INJECTION, SOLUTION SUBCUTANEOUS at 20:48

## 2022-08-30 RX ADMIN — SODIUM CHLORIDE: 9 INJECTION, SOLUTION INTRAVENOUS at 17:31

## 2022-08-30 RX ADMIN — ASPIRIN 81 MG CHEWABLE TABLET 81 MG: 81 TABLET CHEWABLE at 09:21

## 2022-08-30 RX ADMIN — LEVOTHYROXINE SODIUM 75 MCG: 75 TABLET ORAL at 05:31

## 2022-08-30 RX ADMIN — ONDANSETRON 4 MG: 4 TABLET, ORALLY DISINTEGRATING ORAL at 09:21

## 2022-08-30 RX ADMIN — PANTOPRAZOLE SODIUM 40 MG: 40 TABLET, DELAYED RELEASE ORAL at 05:31

## 2022-08-30 RX ADMIN — ATORVASTATIN CALCIUM 80 MG: 40 TABLET, FILM COATED ORAL at 20:48

## 2022-08-30 RX ADMIN — CEFEPIME HYDROCHLORIDE 2000 MG: 2 INJECTION, POWDER, FOR SOLUTION INTRAVENOUS at 16:22

## 2022-08-30 RX ADMIN — AMLODIPINE BESYLATE 10 MG: 10 TABLET ORAL at 11:45

## 2022-08-30 RX ADMIN — SODIUM CHLORIDE: 9 INJECTION, SOLUTION INTRAVENOUS at 03:40

## 2022-08-30 RX ADMIN — ENOXAPARIN SODIUM 40 MG: 100 INJECTION SUBCUTANEOUS at 17:16

## 2022-08-30 RX ADMIN — Medication 10 MG: at 20:48

## 2022-08-30 ASSESSMENT — PAIN SCALES - GENERAL: PAINLEVEL_OUTOF10: 0

## 2022-08-30 NOTE — PLAN OF CARE
Problem: Skin/Tissue Integrity  Goal: Absence of new skin breakdown  Description: 1. Monitor for areas of redness and/or skin breakdown  2. Assess vascular access sites hourly  3. Every 4-6 hours minimum:  Change oxygen saturation probe site  4. Every 4-6 hours:  If on nasal continuous positive airway pressure, respiratory therapy assess nares and determine need for appliance change or resting period.   Outcome: Progressing     Problem: Chronic Conditions and Co-morbidities  Goal: Patient's chronic conditions and co-morbidity symptoms are monitored and maintained or improved  Outcome: Progressing

## 2022-08-30 NOTE — PROGRESS NOTES
Physical Therapy    Physical Therapy Treatment Note  Name: Natalie Greer MRN: [id number] :   1940   Date:  2022   Admission Date: 2022 Room:  [address] [place] [address]   Restrictions/Precautions:          contact precautions   Communication with other providers:  OT   Subjective:  Patient states:  \"I cant wait to eat\"   Pain:   Location, Type, Intensity (0/10 to 10/10):  denies   Objective:    Observation:    Supine in bed. Cooperative with therapy. Inc time for mobility with slow initiation and transitions. Treatment, including education/measures:  Rolling: partial roll modA for trunk support and facilitation to the left with hand over hand assist to reach bed rail   Supine to sit: Eitan x 2 for hip scooting and uprighting trunk. Cues provided for sequencing LEs to EOB  Scooting: Eitan fwd to EOB   Sitting balance: SBA at EOB, static and light dynamic with at least single UE support x ~ 6 minutes   Sit to stand: modA x2 for fwd weight shift, lift, steadying and support while transitioning UEs from bed to RW. Needed hand over hand assist to grasp walker   Stand to sit: Eitan for controlling sit to recliner. Cues for reaching back to seat surface for support   Step pivot: Eitan for steadying assist with RW. Cues for sequencing full turn with AD prior to initiating sit. Needed some assist navigating turn with RW   Standing balance: static stance at RW Eitan with postural cues at trunk/hips for uprighting self. Very fwd flexed posture. Ambulation: ~6ft with RW Eitan for steadying. Fwd flexed posture, shuffled steps, and dec héctor. Educated pt thoroughly on POC, role of PT, DME use, discharge to possibly ARU and their requirements.  Cues provided for sequencing to inc safety and indep with mobility     Assessment / Impression:    Patient's tolerance of treatment:  good    Adverse Reaction: no  Significant change in status and impact:  inc arousal and active participation compared to initial eval.   Barriers to improvement:  activitytolerance, strength, balance  Plan for Next Session:    Activity tolerance, strength, balance, gait, transfers, bed mobility   Time in:  1323  Time out:  1347  Timed treatment minutes:  24  Total treatment time:  24 minutes     Previously filed items:  Social/Functional History  Lives With: Son  Home Equipment: Rollator        Short Term Goals  Time Frame for Short term goals: 2 weeks  Short term goal 1: Pt will perform sit><supine maxA  Short term goal 2: Pt will perform static sitting x 5 minutes SBA with BUE support  Short term goal 3: Pt will transfer between surfaces maxA    Electronically signed by:    Juan Taylor [id number]  8/30/2022, 3:59 PM

## 2022-08-30 NOTE — PROGRESS NOTES
Occupational Therapy Re-Evaluation Note  Name: Herminia George MRN: [id number] :   1940   Date:  2022   Admission Date: 2022 Room:  [address] [place] [address]-A     Primary Problem:  The primary encounter diagnosis was Urinary tract infection associated with cystostomy catheter, initial encounter ([facility] [address] A diagnosis of Septicemia ([facility] was also pertinent to this visit. Communication with other providers:  cotx with PT Angelica    Subjective:  Patient states: \"Everyone keeps interrupting my lunch\"  Pain: denies  Restrictions: general precautions, fall risk, contact precautions    Objective:    Observation:  pt was semi fowlers in bed eating lunch upon arrival, agreeable to session. joseph Hopper  Objective Measures:  stable  ROM: WFL  Strength: 4-/5 BUE  Sensation: acknowledges dulled sensation/impaired coordination in B hands  Cognition: A/Ox3      AM-PAC 6 click short form for inpatient daily activity:   How much help from another person does the patient currently need. .. Unable  Dep A Lot  Max A A Lot   Mod A A Little  Min A A Little   CGA  SBA None   Mod I  Indep  Sup   1. Putting on and taking off regular lower body clothing? [] 1    [] 2   [x] 2   [] 3   [] 3   [] 4      2. Bathing (including washing, rinsing, drying)? [] 1   [] 2   [x] 2 [] 3 [] 3 [] 4   3. Toileting, which includes using toilet, bedpan, or urinal? [] 1    [] 2   [x] 2   [] 3   [] 3   [] 4     4. Putting on and taking off regular upper body clothing? [] 1   [] 2   [] 2   [] 3   [x] 3    [] 4      5. Taking care of personal grooming such as brushing teeth? [] 1   [] 2    [] 2 [] 3    [x] 3   [] 4      6. Eating meals?    [] 1   [] 2   [] 2   [] 3   [x] 3   [] 4        Raw Score:  15      24 = unimpaired  23/24 = 1-20% impaired   - = 21-40% impaired  15/24- = 41-59% impaired   10/24- = 60%-79% impaired  - = 80%-99% impaired   = 100% impaired       Treatment, including education:    Therapeutic Activity Training (10 minutes): Therapeutic activity training was instructed today. Cues were given for safety, sequence, UE/LE placement, visual cues, and balance. Activities performed today included pt demo supine to sit EOB with min Ax2, able to remain seated EOB with SBA-CGA. Pt standing from EOB with mod Ax2, pt standing with severe forward flexed posture, repeated cueing- pt able to correct and stand upright. Pt ambulating few steps to chair with min A using FWW. Pt sitting in chair with min A for controlled descent. Pt positioned for comfort, setup for eating lunch again with microwaved soup, assisted pt in order different soup. Edu on therapy POC, discharge disposition    Education: Role of OT, OT POC, safety, benefits of EOB/OOB activity, rationale for treatment  Safety Measures: Gait belt used for safety of pt and therapist, Left in recliner, Alarm in place, call light and phone within reach, lines managed    Assessment / Impression:    OT Re-eval warranted d/t pt recent cognitive recovery, not lethargic as he was during eval 8/29. Pt currently requiring SBA-mod A for self cares, min-mod A for mobility using FWW. Pt with improved cognition today, however still with impaired endurance, generalized weakness, some confusion. Recommendations for d/c remain ARU. Goals updated below to reflect pt current disposition.     Barriers to improvement: cog, debility, endurance    Pt Social/Functional Hx:    Lives With: Son  Type of Home: House  Home Layout: One level  Home Access: Ramped entrance  Bathroom Shower/Tub: Tub/Shower unit,Shower chair with back  Bathroom Toilet: Standard  Bathroom Equipment: Grab bars in shower,Shower chair  Bathroom Accessibility: [facility] accessible (states once he is in bathroom he uses countertops and grab bars, but pt not able to clarify if this is because there is little room or just preference)  Home Equipment: [address] walker (pt thinks there may be a BSC from his wife in storage, but unsure)  ADL Assistance: Independent  Homemaking Assistance: Independent  Ambulation Assistance: Independent (mod I with 4ww)  Transfer Assistance: Independent  Active : No  Patient's  Info: 3 sons assist in driving. Mode of Transportation: Car,SUV  Occupation: Retired  Type of occupation: Retired from [address] [place] [address] Street: TV, very little reading, no pets, doctors, son manages groceries,  Meds are managed via pill dispenser that son manages. Pt occasionally writes checks and some auto pay  Additional Comments: pt sleeps in flat bed. Pt has fallen >4 times in past year. Pt attributes many to low BS. No injury  *Pt was with recent admission March 2022, was d/c to ARU, and then to [facility]. Info taken from ARU stay. Spoke briefly with son, states that pt was initially doing much better around home (able to dress self, ambulating mod I using rollator), however has had reoccurring UTIs that tend to make independence fluctuate. Pt son also states that pt receives [facility] aides and therapy services (PT and OT) and that pt's other son is present at all times through the day and on disability himself.     Goals:  Time frame for goals: 2 weeks  Pt will complete feeding tasks with ind  Pt will complete grooming tasks with CGA standing at sink  Pt will complete toileting tasks with CGA using FWW  Pt will complete UB dressing and bathing tasks with ind  Pt will complete LB dressing and bathing tasks with CGA  Pt will complete therapeutic exercise/activity to increase independence in ADL/IADL function  Pt will practice functional transfers and mobility with AD for increased safety and independence    Plan for Next Session:    Continue OT POC    Time in:  1323  Time out:  1347  Treatment minutes: 10  Re-evaluation minutes: 10  Total time:  24    Electronically signed by:    Tessie Manning OT, OTR/L  8/30/2022, 3:49 PM

## 2022-08-30 NOTE — PROGRESS NOTES
Physician Progress Note      PATIENT:               Eliezer Garcia  CSN #:                  [id number]  :                       1940  ADMIT DATE:       2022 6:18 PM  [address] [place] DATE:  RESPONDING  PROVIDER #:        TIO Hill MD          QUERY TEXT:    Hospitalists,    Pt admitted with UTI and has SP catheter. Noted documentation of CAUTI by the   ED physician. If possible, please document in progress notes and discharge   summary:    [UTI due to suprapubic catheter::UTI is due to suprapubic catheter.]]    The medical record reflects the following:  Risk Factors: SP cath  Clinical Indicators: UTI, chronic SP cath w/ CAUTI documented by ED physician,   last cath change 2022  Treatment: labs, imaging, atb    Thank you,  Phu Garcia RN CDS  [phone]  Options provided:  -- UTI not due to suprapubic catheter  -- Other - I will add my own diagnosis  -- Disagree - Not applicable / Not valid  -- Disagree - Clinically unable to determine / Unknown  -- Refer to Clinical Documentation Reviewer    PROVIDER RESPONSE TEXT:    UTI is not due to suprapubic catheter.     Query created by: Darryle Public on 2022 3:32 PM      Electronically signed by:  Sharad Allen MD 2022 7:49 AM

## 2022-08-30 NOTE — PLAN OF CARE
Problem: Discharge Planning  Goal: Discharge to home or other facility with appropriate resources  Outcome: Progressing     Problem: Skin/Tissue Integrity  Goal: Absence of new skin breakdown  Description: 1. Monitor for areas of redness and/or skin breakdown  2. Assess vascular access sites hourly  3. Every 4-6 hours minimum:  Change oxygen saturation probe site  4. Every 4-6 hours:  If on nasal continuous positive airway pressure, respiratory therapy assess nares and determine need for appliance change or resting period.   8/30/2022 1140 by Cresencio Coles LPN  Outcome: Progressing  8/30/2022 0112 by Susann Gitelman, RN  Outcome: Progressing     Problem: Chronic Conditions and Co-morbidities  Goal: Patient's chronic conditions and co-morbidity symptoms are monitored and maintained or improved  8/30/2022 1140 by Cresencio Coles LPN  Outcome: Progressing  8/30/2022 0112 by Susann Gitelman, RN  Outcome: Progressing     Problem: Pain  Goal: Verbalizes/displays adequate comfort level or baseline comfort level  Outcome: Progressing     Problem: Safety - Adult  Goal: Free from fall injury  Outcome: Progressing

## 2022-08-30 NOTE — PROGRESS NOTES
[facility] HOSPITALIST PROGRESS NOTE      PCP: Kevin Ramsay MD    Date of Admission: 8/28/2022    Subjective: feels weak      Brief Hospital summary Patient is an 75-year-old male with history of prostate cancer, diabetes mellitus, essential hypertension who was admitted with fatigue overnight. History of suprapubic catheter, UA positive for cystitis, was started on cefepime, blood culture positive for E. coli       Vitals signs:  Afebrile, heart rate 80s range, blood pressure 149/59, on room air  BM  Medications: Aspirin, Lipitor, cefepime, docusate, Lovenox, Lantus, sliding scale insulin, levothyroxine, pantoprazole, MiraLAX, Lyrica, normal saline 100 mL/h    Antibiotics: Cefepime day 3    Fluid status: Urine output 510 cc    Labs:   WBC 13.7, hemoglobin 8.1, platelets 912  Sodium 139, potassium 4.3, creatinine 1.8    Blood cultures from 8/28 positive for E. Coli    Imaging:   CXR  Impression:        Increased interstitial opacity. While much or all of this may be chronic,   please correlate with any clinical evidence of acute interstitial edema.             Assessment/Plan:     Sepsis secondary to acute cystitis without hematuria  E. coli bacteremia  History of prostate cancer status post prostatectomy 1996  Diabetes mellitus type  Essential hypertension  Hypothyroidism    Admitted with fatigue, UA positive for cystitis, leukocyte and fevers on admission  Blood culture positive for E. coli 2 out of 2, repeat blood cultures today  Leukocytosis improving on cefepime  Continue for now  Hemoglobin stable, continue to monitor  Creatinine at baseline    Glucose 134-221 range, continue Lantus 22 units, sliding scale insulin    Continue levothyroxine    As needed hydralazine for now, lisinopril Coreg and Norvasc held on admission  Restart amlodipine    Consult PT OT today      DVT prophlaxis:   Lovenox      Physical Exam Performed:       BP (!) 149/59   Pulse 86   Temp 98.6 °F (37 °C) (Oral)   Resp 16   Ht 5' 8\" (1.727 m)   Wt 206 lb (93.4 kg)   SpO2 98%   BMI 31.32 kg/m²     Physical Exam  Constitutional:       General: He is not in acute distress. Appearance: Normal appearance. HENT:      Head: Normocephalic and atraumatic. Right Ear: External ear normal.      Left Ear: External ear normal.   Eyes:      Extraocular Movements: Extraocular movements intact. Pupils: Pupils are equal, round, and reactive to light. Cardiovascular:      Rate and Rhythm: Normal rate and regular rhythm. Heart sounds: No murmur heard. Pulmonary:      Effort: Pulmonary effort is normal. No respiratory distress. Breath sounds: Normal breath sounds. No wheezing. Abdominal:      General: Bowel sounds are normal. There is no distension. Palpations: Abdomen is soft. Tenderness: There is no abdominal tenderness. Musculoskeletal:         General: No swelling. Cervical back: Normal range of motion. Skin:     General: Skin is warm. Neurological:      General: No focal deficit present. Mental Status: He is alert and oriented to person, place, and time. Cranial Nerves: No cranial nerve deficit. Psychiatric:         Mood and Affect: Mood normal.       Labs:   Recent Labs     08/28/22 1920 08/29/22  0824 08/30/22  0445   WBC 15.8* 20.5* 13.7*   HGB 9.6* 8.1* 8.1*   HCT 29.6* 25.4* 26.2*    168 168     Recent Labs     08/28/22 1920 08/29/22  0824 08/30/22  0445    138 139   K 5.3* 4.3 4.3    108 109   CO2 20* 21 21   BUN 36* 40* 35*   CREATININE 1.7* 2.0* 1.8*   CALCIUM 9.0 8.0* 8.2*     Recent Labs     08/28/22 1920   AST 18   ALT 13   BILITOT 0.5   ALKPHOS 106     No results for input(s): INR in the last 72 hours. No results for input(s): Lattie Jessika in the last 72 hours.     Urinalysis:      Lab Results   Component Value Date/Time    NITRU POSITIVE 08/28/2022 11:20 PM    NITRU NEGATIVE 03/23/2013 02:24 PM    [address] 08/28/2022 11:20 PM    BACTERIA MANY 08/28/2022 11:20 PM    MLPIK 295 08/28/2022 11:20 PM    BLOODU LARGE 08/28/2022 11:20 PM    SPECGRAV 1.020 08/28/2022 11:20 PM    GLUCOSEU 1,000 03/23/2013 02:24 PM       Radiology:  XR CHEST PORTABLE   Final Result   Increased interstitial opacity. While much or all of this may be chronic,   please correlate with any clinical evidence of acute interstitial edema.                  Benjamin Ortiz MD  8/30/2022 9:46 AM

## 2022-08-31 LAB
ALBUMIN SERPL-MCNC: 2.7 GM/DL (ref 3.4–5)
ALP BLD-CCNC: 83 IU/L (ref 40–128)
ALT SERPL-CCNC: 10 U/L (ref 10–40)
ANION GAP SERPL CALCULATED.3IONS-SCNC: 8 MMOL/L (ref 4–16)
AST SERPL-CCNC: 16 IU/L (ref 15–37)
BILIRUB SERPL-MCNC: 0.2 MG/DL (ref 0–1)
BUN BLDV-MCNC: 29 MG/DL (ref 6–23)
CALCIUM SERPL-MCNC: 7.9 MG/DL (ref 8.3–10.6)
CHLORIDE BLD-SCNC: 113 MMOL/L (ref 99–110)
CO2: 19 MMOL/L (ref 21–32)
CREAT SERPL-MCNC: 1.8 MG/DL (ref 0.9–1.3)
CULTURE: ABNORMAL
GFR AFRICAN AMERICAN: 44 ML/MIN/1.73M2
GFR NON-AFRICAN AMERICAN: 36 ML/MIN/1.73M2
GLUCOSE BLD-MCNC: 101 MG/DL (ref 70–99)
GLUCOSE BLD-MCNC: 103 MG/DL (ref 70–99)
GLUCOSE BLD-MCNC: 107 MG/DL (ref 70–99)
GLUCOSE BLD-MCNC: 108 MG/DL (ref 70–99)
GLUCOSE BLD-MCNC: 194 MG/DL (ref 70–99)
GLUCOSE BLD-MCNC: 52 MG/DL (ref 70–99)
GLUCOSE BLD-MCNC: 54 MG/DL (ref 70–99)
GLUCOSE BLD-MCNC: 74 MG/DL (ref 70–99)
GLUCOSE BLD-MCNC: 86 MG/DL (ref 70–99)
GLUCOSE BLD-MCNC: 92 MG/DL (ref 70–99)
HCT VFR BLD CALC: 24.5 % (ref 42–52)
HEMOGLOBIN: 7.6 GM/DL (ref 13.5–18)
Lab: ABNORMAL
Lab: ABNORMAL
MCH RBC QN AUTO: 30.5 PG (ref 27–31)
MCHC RBC AUTO-ENTMCNC: 31 % (ref 32–36)
MCV RBC AUTO: 98.4 FL (ref 78–100)
PDW BLD-RTO: 15.3 % (ref 11.7–14.9)
PLATELET # BLD: 148 K/CU MM (ref 140–440)
PMV BLD AUTO: 11.1 FL (ref 7.5–11.1)
POTASSIUM SERPL-SCNC: 4.2 MMOL/L (ref 3.5–5.1)
RBC # BLD: 2.49 M/CU MM (ref 4.6–6.2)
SODIUM BLD-SCNC: 140 MMOL/L (ref 135–145)
SPECIMEN: ABNORMAL
SPECIMEN: ABNORMAL
TOTAL PROTEIN: 4.8 GM/DL (ref 6.4–8.2)
WBC # BLD: 6.3 K/CU MM (ref 4–10.5)

## 2022-08-31 PROCEDURE — 36415 COLL VENOUS BLD VENIPUNCTURE: CPT

## 2022-08-31 PROCEDURE — 85027 COMPLETE CBC AUTOMATED: CPT

## 2022-08-31 PROCEDURE — 2580000003 HC RX 258: Performed by: PHYSICIAN ASSISTANT

## 2022-08-31 PROCEDURE — 1200000000 HC SEMI PRIVATE

## 2022-08-31 PROCEDURE — 6360000002 HC RX W HCPCS: Performed by: STUDENT IN AN ORGANIZED HEALTH CARE EDUCATION/TRAINING PROGRAM

## 2022-08-31 PROCEDURE — 94761 N-INVAS EAR/PLS OXIMETRY MLT: CPT

## 2022-08-31 PROCEDURE — 6360000002 HC RX W HCPCS: Performed by: HOSPITALIST

## 2022-08-31 PROCEDURE — 80053 COMPREHEN METABOLIC PANEL: CPT

## 2022-08-31 PROCEDURE — 82962 GLUCOSE BLOOD TEST: CPT

## 2022-08-31 PROCEDURE — 6370000000 HC RX 637 (ALT 250 FOR IP): Performed by: STUDENT IN AN ORGANIZED HEALTH CARE EDUCATION/TRAINING PROGRAM

## 2022-08-31 PROCEDURE — 2580000003 HC RX 258: Performed by: STUDENT IN AN ORGANIZED HEALTH CARE EDUCATION/TRAINING PROGRAM

## 2022-08-31 PROCEDURE — 2580000003 HC RX 258: Performed by: HOSPITALIST

## 2022-08-31 PROCEDURE — 6370000000 HC RX 637 (ALT 250 FOR IP): Performed by: HOSPITALIST

## 2022-08-31 RX ADMIN — BISACODYL 10 MG: 10 SUPPOSITORY RECTAL at 08:41

## 2022-08-31 RX ADMIN — AMLODIPINE BESYLATE 10 MG: 10 TABLET ORAL at 08:42

## 2022-08-31 RX ADMIN — PREGABALIN 50 MG: 50 CAPSULE ORAL at 08:41

## 2022-08-31 RX ADMIN — CEFEPIME HYDROCHLORIDE 2000 MG: 2 INJECTION, POWDER, FOR SOLUTION INTRAVENOUS at 03:33

## 2022-08-31 RX ADMIN — PREGABALIN 50 MG: 50 CAPSULE ORAL at 21:00

## 2022-08-31 RX ADMIN — POLYETHYLENE GLYCOL (3350) 17 G: 17 POWDER, FOR SOLUTION ORAL at 21:00

## 2022-08-31 RX ADMIN — SODIUM CHLORIDE: 9 INJECTION, SOLUTION INTRAVENOUS at 14:27

## 2022-08-31 RX ADMIN — MEROPENEM 1000 MG: 1 INJECTION, POWDER, FOR SOLUTION INTRAVENOUS at 13:35

## 2022-08-31 RX ADMIN — DOCUSATE SODIUM 100 MG: 100 CAPSULE, LIQUID FILLED ORAL at 08:42

## 2022-08-31 RX ADMIN — ATORVASTATIN CALCIUM 80 MG: 40 TABLET, FILM COATED ORAL at 21:00

## 2022-08-31 RX ADMIN — Medication 10 MG: at 21:00

## 2022-08-31 RX ADMIN — ENOXAPARIN SODIUM 40 MG: 100 INJECTION SUBCUTANEOUS at 17:10

## 2022-08-31 RX ADMIN — INSULIN GLARGINE 22 UNITS: 100 INJECTION, SOLUTION SUBCUTANEOUS at 21:00

## 2022-08-31 RX ADMIN — POLYETHYLENE GLYCOL (3350) 17 G: 17 POWDER, FOR SOLUTION ORAL at 08:41

## 2022-08-31 RX ADMIN — DOCUSATE SODIUM 100 MG: 100 CAPSULE, LIQUID FILLED ORAL at 21:00

## 2022-08-31 RX ADMIN — PANTOPRAZOLE SODIUM 40 MG: 40 TABLET, DELAYED RELEASE ORAL at 05:21

## 2022-08-31 RX ADMIN — ASPIRIN 81 MG CHEWABLE TABLET 81 MG: 81 TABLET CHEWABLE at 08:45

## 2022-08-31 RX ADMIN — ACETAMINOPHEN 650 MG: 325 TABLET ORAL at 09:00

## 2022-08-31 RX ADMIN — LEVOTHYROXINE SODIUM 75 MCG: 75 TABLET ORAL at 05:20

## 2022-08-31 ASSESSMENT — PAIN SCALES - GENERAL
PAINLEVEL_OUTOF10: 1
PAINLEVEL_OUTOF10: 7
PAINLEVEL_OUTOF10: 5
PAINLEVEL_OUTOF10: 7
PAINLEVEL_OUTOF10: 7

## 2022-08-31 ASSESSMENT — PAIN - FUNCTIONAL ASSESSMENT: PAIN_FUNCTIONAL_ASSESSMENT: ACTIVITIES ARE NOT PREVENTED

## 2022-08-31 ASSESSMENT — PAIN DESCRIPTION - LOCATION
LOCATION: BACK;HIP
LOCATION: FOOT
LOCATION: BACK

## 2022-08-31 ASSESSMENT — PAIN DESCRIPTION - DESCRIPTORS
DESCRIPTORS: ACHING
DESCRIPTORS: DISCOMFORT;ACHING
DESCRIPTORS: DISCOMFORT;ACHING

## 2022-08-31 ASSESSMENT — PAIN SCALES - WONG BAKER
WONGBAKER_NUMERICALRESPONSE: 0

## 2022-08-31 ASSESSMENT — PAIN DESCRIPTION - PAIN TYPE
TYPE: ACUTE PAIN
TYPE: ACUTE PAIN

## 2022-08-31 ASSESSMENT — PAIN DESCRIPTION - ORIENTATION
ORIENTATION: LEFT
ORIENTATION: RIGHT;LEFT;POSTERIOR

## 2022-08-31 ASSESSMENT — PAIN DESCRIPTION - FREQUENCY: FREQUENCY: INTERMITTENT

## 2022-08-31 NOTE — PROGRESS NOTES
Physical Therapy  Pt OT's note pt's nurse recommended not therapy today d/t a fever and added antibiotics, will cont. Kayla Smith.  Roger Reagin PTA

## 2022-08-31 NOTE — PROGRESS NOTES
Hospitalist Progress Note      Name:  Herminia George /Age/Sex: 1940  (78 y.o. male)   MRN & CSN:  [id number] & [id number] Admission Date/Time: 2022  6:18 PM   Location:  [address] [place] [address] PCP: Karthik Garcia MD         Hospital Day: 4    Assessment and Plan:     73-year-old male with history of prostate cancer, diabetes mellitus, essential hypertension who was admitted with fatigue overnight. History of suprapubic catheter, UA positive for cystitis, was started on cefepime, blood culture positive for E. Coli    ESBL bacteremia: Urine culture on  growing E. coli resistant to Cipro and cefepime  With leukocytosis and fever  Continue IV fluid  Switched IV antibiotic to IV meropenem  Consult ID  Urine culture on  showing no significant growth  Order CT abdomen pelvis to look for the source of the infection could be gallbladder as the patient has abdominal pain  Will order another blood culture tomorrow  Reported hematuria on admission hemoglobin 8.1 yesterday  Check another CBC today      History of prostate cancer status post prostatectomy     Hypertension: Blood pressure stable continue on current medication    Hypothyroidism continue levothyroxine      Type 2 diabetes: On long-acting insulin Lantus and sliding scale correction  On hypoglycemia protocol    Diet ADULT DIET; Dysphagia - Soft and Bite Sized; 5 carb choices (75 gm/meal); Low Fat/Low Chol/High Fiber/JORGE; Low Sodium (2 gm)   DVT Prophylaxis [x] Lovenox, []  Heparin, [] SCDs, [] Ambulation   GI Prophylaxis [] PPI,  [] H2 Blocker,  [] Carafate,  [] Diet/Tube Feeds   Code Status Full Code   Disposition Patient requires continued admission due to    MDM [] Low, [] Moderate,[]  High  Patient's risk as above due to      History of Present Illness:    The patient was seen and examined at the bedside  Patient looks tired and weak  Complain of abdominal pain  He has ESBL bacteremia switch to IV meropenem  Check CT abdomen pelvis and consult ID    Objective: Intake/Output Summary (Last 24 hours) at 8/31/2022 1103  Last data filed at 8/31/2022 0525  Gross per 24 hour   Intake --   Output 1175 ml   Net -1175 ml      Vitals:   Vitals:    08/31/22 0842   BP: (!) 142/60   Pulse: 83   Resp: 16   Temp: 99 °F (37.2 °C)   SpO2: 96%     Physical Exam:   GEN Awake.  Alert , not in respiratory distress, not in pain  HEENT: PEERLA, , supple neck,   Chest: air entry equal bilaterally, no wheezing or crepitation  Heart: S1 and S2 heard, no murmur, no gallop or rub, regular rate  Abdomen: soft, ND , tender without rigidity +BS  Extremities: no cyanosis, tenderness or erythema, peripheral pulses audible  Neurology: alert, confused, able to move 4 limbs    Medications:   Medications:    meropenem  1,000 mg IntraVENous Once    Followed by    Azeb De La Cruz ON 9/1/2022] meropenem  1,000 mg IntraVENous Q12H    amLODIPine  10 mg Oral Daily    aspirin  81 mg Oral Daily    atorvastatin  80 mg Oral Nightly    levothyroxine  75 mcg Oral Daily    docusate sodium  100 mg Oral BID    bisacodyl  10 mg Rectal Daily    melatonin  10 mg Oral Nightly    pantoprazole  40 mg Oral QAM AC    polyethylene glycol  17 g Oral BID    pregabalin  50 mg Oral BID    sodium chloride flush  5-40 mL IntraVENous 2 times per day    enoxaparin  40 mg SubCUTAneous QPM    insulin glargine  0.25 Units/kg SubCUTAneous Nightly    insulin lispro  0-4 Units SubCUTAneous TID WC    insulin lispro  0-4 Units SubCUTAneous Nightly      Infusions:    sodium chloride      dextrose      sodium chloride 100 mL/hr at 08/30/22 1731     PRN Meds: hydrALAZINE (APRESOLINE) ivpb, 10 mg, Q4H PRN  magnesium hydroxide, 30 mL, Daily PRN  sodium chloride flush, 5-40 mL, PRN  sodium chloride, , PRN  ondansetron, 4 mg, Q8H PRN   Or  ondansetron, 4 mg, Q6H PRN  aluminum & magnesium hydroxide-simethicone, 30 mL, Q6H PRN  acetaminophen, 650 mg, Q6H PRN   Or  acetaminophen, 650 mg, Q6H PRN  glucose, 4 tablet, PRN  dextrose bolus, 125 mL, PRN   Or  dextrose bolus, 250 mL, PRN  glucagon (rDNA), 1 mg, PRN  dextrose, , Continuous PRN  ipratropium-albuterol, 1 vial, BID PRN          Electronically signed by Sybil Manning MD on 8/31/2022 at 11:03 AM

## 2022-08-31 NOTE — PROGRESS NOTES
Occupational Therapy  . Attempted at 1512 hrs s/p chart review and check c Nurse Joelle Downey who reports pt is running a low grade temp and is being treated c additional antibiotics and would benefit from a day off from therapy. Plan is to continue OT c re-attempt tomorrow or as schedule allows.      Electronically signed by:    SAMANTHA Rosario  8/31/2022, 1:24 PM

## 2022-08-31 NOTE — PLAN OF CARE
Problem: Discharge Planning  Goal: Discharge to home or other facility with appropriate resources  Outcome: Progressing  Flowsheets (Taken 8/31/2022 1298)  Discharge to home or other facility with appropriate resources: Identify barriers to discharge with patient and caregiver     Problem: Skin/Tissue Integrity  Goal: Absence of new skin breakdown  Description: 1. Monitor for areas of redness and/or skin breakdown  2. Assess vascular access sites hourly  3. Every 4-6 hours minimum:  Change oxygen saturation probe site  4. Every 4-6 hours:  If on nasal continuous positive airway pressure, respiratory therapy assess nares and determine need for appliance change or resting period.   Outcome: Progressing     Problem: Chronic Conditions and Co-morbidities  Goal: Patient's chronic conditions and co-morbidity symptoms are monitored and maintained or improved  Outcome: Progressing  Flowsheets (Taken 8/31/2022 0842)  Care Plan - Patient's Chronic Conditions and Co-Morbidity Symptoms are Monitored and Maintained or Improved: Monitor and assess patient's chronic conditions and comorbid symptoms for stability, deterioration, or improvement     Problem: Pain  Goal: Verbalizes/displays adequate comfort level or baseline comfort level  Outcome: Progressing     Problem: Safety - Adult  Goal: Free from fall injury  Outcome: Progressing

## 2022-08-31 NOTE — CARE COORDINATION
Reviewed updated therapy notes. Patient appears to be less confused and performed better with therapy. Spoke with patients son regarding plan at discharge from ARU. Per patients son goal is for patient to return home with his other son once he regains his strength. Mentioned the concern that patient d/c'd to SNF last ARU admission d/t patient being at home alone for long periods of time. Per patients son family has more help with patient now when they are not there to supervise him and really prefer patient to admit to ARU and go home from there. Have yet to present to Dr. Shakila Hernández d/t awaiting ID consult note and patient not being medically ready. Once note is in epic will present to Dr. Shakila Hernández and update CM with determination. Discussed above information with [facility] Eunice MCKEON.

## 2022-09-01 ENCOUNTER — APPOINTMENT (OUTPATIENT)
Dept: CT IMAGING | Age: 82
DRG: 853 | End: 2022-09-01
Payer: MEDICARE

## 2022-09-01 LAB
ALBUMIN SERPL-MCNC: 2.8 GM/DL (ref 3.4–5)
ALP BLD-CCNC: 91 IU/L (ref 40–128)
ALT SERPL-CCNC: 16 U/L (ref 10–40)
ANION GAP SERPL CALCULATED.3IONS-SCNC: 7 MMOL/L (ref 4–16)
AST SERPL-CCNC: 25 IU/L (ref 15–37)
BILIRUB SERPL-MCNC: 0.2 MG/DL (ref 0–1)
BUN BLDV-MCNC: 25 MG/DL (ref 6–23)
CALCIUM SERPL-MCNC: 8 MG/DL (ref 8.3–10.6)
CHLORIDE BLD-SCNC: 112 MMOL/L (ref 99–110)
CO2: 19 MMOL/L (ref 21–32)
CREAT SERPL-MCNC: 1.8 MG/DL (ref 0.9–1.3)
GFR AFRICAN AMERICAN: 44 ML/MIN/1.73M2
GFR NON-AFRICAN AMERICAN: 36 ML/MIN/1.73M2
GLUCOSE BLD-MCNC: 104 MG/DL (ref 70–99)
GLUCOSE BLD-MCNC: 122 MG/DL (ref 70–99)
GLUCOSE BLD-MCNC: 142 MG/DL (ref 70–99)
GLUCOSE BLD-MCNC: 172 MG/DL (ref 70–99)
GLUCOSE BLD-MCNC: 39 MG/DL (ref 70–99)
GLUCOSE BLD-MCNC: 58 MG/DL (ref 70–99)
GLUCOSE BLD-MCNC: 64 MG/DL (ref 70–99)
GLUCOSE BLD-MCNC: 86 MG/DL (ref 70–99)
HCT VFR BLD CALC: 25 % (ref 42–52)
HEMOGLOBIN: 7.4 GM/DL (ref 13.5–18)
MCH RBC QN AUTO: 30.2 PG (ref 27–31)
MCHC RBC AUTO-ENTMCNC: 29.6 % (ref 32–36)
MCV RBC AUTO: 102 FL (ref 78–100)
PDW BLD-RTO: 15.4 % (ref 11.7–14.9)
PLATELET # BLD: 155 K/CU MM (ref 140–440)
PMV BLD AUTO: 11.2 FL (ref 7.5–11.1)
POTASSIUM SERPL-SCNC: 4.3 MMOL/L (ref 3.5–5.1)
RBC # BLD: 2.45 M/CU MM (ref 4.6–6.2)
SODIUM BLD-SCNC: 138 MMOL/L (ref 135–145)
TOTAL PROTEIN: 4.9 GM/DL (ref 6.4–8.2)
WBC # BLD: 5.5 K/CU MM (ref 4–10.5)

## 2022-09-01 PROCEDURE — 36415 COLL VENOUS BLD VENIPUNCTURE: CPT

## 2022-09-01 PROCEDURE — 87040 BLOOD CULTURE FOR BACTERIA: CPT

## 2022-09-01 PROCEDURE — 1200000000 HC SEMI PRIVATE

## 2022-09-01 PROCEDURE — 82962 GLUCOSE BLOOD TEST: CPT

## 2022-09-01 PROCEDURE — 85027 COMPLETE CBC AUTOMATED: CPT

## 2022-09-01 PROCEDURE — 94761 N-INVAS EAR/PLS OXIMETRY MLT: CPT

## 2022-09-01 PROCEDURE — 2580000003 HC RX 258: Performed by: STUDENT IN AN ORGANIZED HEALTH CARE EDUCATION/TRAINING PROGRAM

## 2022-09-01 PROCEDURE — 6370000000 HC RX 637 (ALT 250 FOR IP): Performed by: STUDENT IN AN ORGANIZED HEALTH CARE EDUCATION/TRAINING PROGRAM

## 2022-09-01 PROCEDURE — 2580000003 HC RX 258: Performed by: PHYSICIAN ASSISTANT

## 2022-09-01 PROCEDURE — 80053 COMPREHEN METABOLIC PANEL: CPT

## 2022-09-01 PROCEDURE — 2580000003 HC RX 258: Performed by: HOSPITALIST

## 2022-09-01 PROCEDURE — 6360000002 HC RX W HCPCS: Performed by: STUDENT IN AN ORGANIZED HEALTH CARE EDUCATION/TRAINING PROGRAM

## 2022-09-01 PROCEDURE — 2500000003 HC RX 250 WO HCPCS: Performed by: STUDENT IN AN ORGANIZED HEALTH CARE EDUCATION/TRAINING PROGRAM

## 2022-09-01 PROCEDURE — 74176 CT ABD & PELVIS W/O CONTRAST: CPT

## 2022-09-01 PROCEDURE — 6360000002 HC RX W HCPCS: Performed by: HOSPITALIST

## 2022-09-01 RX ORDER — INSULIN GLARGINE 100 [IU]/ML
10 INJECTION, SOLUTION SUBCUTANEOUS NIGHTLY
Status: DISCONTINUED | OUTPATIENT
Start: 2022-09-01 | End: 2022-09-05

## 2022-09-01 RX ORDER — INSULIN LISPRO 100 [IU]/ML
0-4 INJECTION, SOLUTION INTRAVENOUS; SUBCUTANEOUS
Status: DISCONTINUED | OUTPATIENT
Start: 2022-09-01 | End: 2022-09-06

## 2022-09-01 RX ADMIN — SODIUM CHLORIDE: 9 INJECTION, SOLUTION INTRAVENOUS at 00:15

## 2022-09-01 RX ADMIN — BISACODYL 10 MG: 10 SUPPOSITORY RECTAL at 09:29

## 2022-09-01 RX ADMIN — DEXTROSE MONOHYDRATE 125 ML: 100 INJECTION, SOLUTION INTRAVENOUS at 17:31

## 2022-09-01 RX ADMIN — DOCUSATE SODIUM 100 MG: 100 CAPSULE, LIQUID FILLED ORAL at 21:38

## 2022-09-01 RX ADMIN — DEXTROSE MONOHYDRATE 125 ML: 100 INJECTION, SOLUTION INTRAVENOUS at 08:23

## 2022-09-01 RX ADMIN — PREGABALIN 50 MG: 50 CAPSULE ORAL at 09:28

## 2022-09-01 RX ADMIN — POLYETHYLENE GLYCOL (3350) 17 G: 17 POWDER, FOR SOLUTION ORAL at 21:38

## 2022-09-01 RX ADMIN — POLYETHYLENE GLYCOL (3350) 17 G: 17 POWDER, FOR SOLUTION ORAL at 09:29

## 2022-09-01 RX ADMIN — Medication 10 MG: at 21:38

## 2022-09-01 RX ADMIN — SODIUM CHLORIDE, PRESERVATIVE FREE 10 ML: 5 INJECTION INTRAVENOUS at 09:29

## 2022-09-01 RX ADMIN — ENOXAPARIN SODIUM 40 MG: 100 INJECTION SUBCUTANEOUS at 18:45

## 2022-09-01 RX ADMIN — GLUCAGON HYDROCHLORIDE 1 MG: KIT at 08:12

## 2022-09-01 RX ADMIN — SODIUM CHLORIDE: 9 INJECTION, SOLUTION INTRAVENOUS at 12:40

## 2022-09-01 RX ADMIN — LEVOTHYROXINE SODIUM 75 MCG: 75 TABLET ORAL at 05:53

## 2022-09-01 RX ADMIN — ATORVASTATIN CALCIUM 80 MG: 40 TABLET, FILM COATED ORAL at 21:38

## 2022-09-01 RX ADMIN — PANTOPRAZOLE SODIUM 40 MG: 40 TABLET, DELAYED RELEASE ORAL at 05:53

## 2022-09-01 RX ADMIN — ASPIRIN 81 MG CHEWABLE TABLET 81 MG: 81 TABLET CHEWABLE at 09:29

## 2022-09-01 RX ADMIN — SODIUM CHLORIDE: 9 INJECTION, SOLUTION INTRAVENOUS at 17:53

## 2022-09-01 RX ADMIN — DOCUSATE SODIUM 100 MG: 100 CAPSULE, LIQUID FILLED ORAL at 09:29

## 2022-09-01 RX ADMIN — MEROPENEM 1000 MG: 1 INJECTION, POWDER, FOR SOLUTION INTRAVENOUS at 12:42

## 2022-09-01 RX ADMIN — MEROPENEM 1000 MG: 1 INJECTION, POWDER, FOR SOLUTION INTRAVENOUS at 00:16

## 2022-09-01 RX ADMIN — PREGABALIN 50 MG: 50 CAPSULE ORAL at 21:38

## 2022-09-01 NOTE — PROGRESS NOTES
Hospitalist Progress Note      Name:  Darlene Torres /Age/Sex: 1940  (78 y.o. male)   MRN & CSN:  [id number] & [id number] Admission Date/Time: 2022  6:18 PM   Location:  [address] [place] [address] PCP: Will Montaño MD         Hospital Day: 5    Assessment and Plan:     47-year-old male with history of prostate cancer, diabetes mellitus, essential hypertension who was admitted with fatigue overnight. History of suprapubic catheter, UA positive for cystitis, was started on cefepime, blood culture positive for E. Coli    ESBL bacteremia: Urine culture on  growing E. coli resistant to Cipro and cefepime  With leukocytosis and fever  Continue IV fluid  Switched IV antibiotic to IV meropenem  Consult ID  Urine culture on  showing no significant growth  Order CT abdomen pelvis to look for the source of the infection could be gallbladder as the patient has abdominal pain  order another 2 sets blood culture today   Reported hematuria on admission hemoglobin 8.1    Hemoglobin down to 7.4  Consult urologist for hematuria  Monitor CBC daily and transfuse for hemoglobin less than 7      History of prostate cancer status post prostatectomy     Hypertension: Blood pressure stable continue on current medication    Hypothyroidism continue levothyroxine    CKD with serum creatinine 1.8 close to his baseline  Continue IV fluid and monitor renal function  Avoid nephrotoxic  Patient with history of prostate cancer        Type 2 diabetes: Patient has episode of low blood sugar this morning   Hold Lantus   on hypoglycemia protocol  Consult endocrinologist  Continue on sliding scale correction for now  Check blood sugar ACH S    Diet ADULT DIET; Dysphagia - Soft and Bite Sized; 5 carb choices (75 gm/meal);  Low Fat/Low Chol/High Fiber/JORGE; Low Sodium (2 gm)   DVT Prophylaxis [x] Lovenox, []  Heparin, [] SCDs, [] Ambulation   GI Prophylaxis [] PPI,  [] H2 Blocker,  [] Carafate,  [] Diet/Tube Feeds Code Status Full Code   Disposition Patient requires continued admission due to    MDM [] Low, [] Moderate,[]  High  Patient's risk as above due to      History of Present Illness: The patient was seen and examined at the bedside  Patient looks tired and weak  Patient has episodes of low blood sugar this morning with a twitching of his mouth most likely due to hypoglycemia  Patient stated that usually had the mouth twitching with low blood sugar  Hold long-acting insulin  Consult endocrinologist  No abdominal pain today  CT abdomen pelvis still pending  He has ESBL bacteremia switch to IV meropenem  consult ID    Objective: Intake/Output Summary (Last 24 hours) at 9/1/2022 0936  Last data filed at 8/31/2022 1438  Gross per 24 hour   Intake 0 ml   Output 1200 ml   Net -1200 ml        Vitals:   Vitals:    09/01/22 0832   BP: (!) 177/69   Pulse: 80   Resp: 16   Temp: 99.3 °F (37.4 °C)   SpO2: 97%     Physical Exam:   GEN Awake.  Alert , not in respiratory distress, not in pain  HEENT: PEERLA, , supple neck,   Chest: air entry equal bilaterally, no wheezing or crepitation  Heart: S1 and S2 heard, no murmur, no gallop or rub, regular rate  Abdomen: soft, ND , tender without rigidity +BS  Extremities: no cyanosis, tenderness or erythema, peripheral pulses audible  Neurology: alert, confused, able to move 4 limbs    Medications:   Medications:    meropenem  1,000 mg IntraVENous Q12H    amLODIPine  10 mg Oral Daily    aspirin  81 mg Oral Daily    atorvastatin  80 mg Oral Nightly    levothyroxine  75 mcg Oral Daily    docusate sodium  100 mg Oral BID    bisacodyl  10 mg Rectal Daily    melatonin  10 mg Oral Nightly    pantoprazole  40 mg Oral QAM AC    polyethylene glycol  17 g Oral BID    pregabalin  50 mg Oral BID    sodium chloride flush  5-40 mL IntraVENous 2 times per day    enoxaparin  40 mg SubCUTAneous QPM    [Held by provider] insulin glargine  0.25 Units/kg SubCUTAneous Nightly    insulin lispro  0-4 Units SubCUTAneous TID WC    insulin lispro  0-4 Units SubCUTAneous Nightly      Infusions:    sodium chloride      dextrose      sodium chloride 100 mL/hr at 09/01/22 0015     PRN Meds: hydrALAZINE (APRESOLINE) ivpb, 10 mg, Q4H PRN  magnesium hydroxide, 30 mL, Daily PRN  sodium chloride flush, 5-40 mL, PRN  sodium chloride, , PRN  ondansetron, 4 mg, Q8H PRN   Or  ondansetron, 4 mg, Q6H PRN  aluminum & magnesium hydroxide-simethicone, 30 mL, Q6H PRN  acetaminophen, 650 mg, Q6H PRN   Or  acetaminophen, 650 mg, Q6H PRN  glucose, 4 tablet, PRN  dextrose bolus, 125 mL, PRN   Or  dextrose bolus, 250 mL, PRN  glucagon (rDNA), 1 mg, PRN  dextrose, , Continuous PRN  ipratropium-albuterol, 1 vial, BID PRN        Electronically signed by Diana Hudson MD on 9/1/2022 at 9:36 AM

## 2022-09-01 NOTE — PROGRESS NOTES
Physical Therapy  Arrived in pt's room with nursing finishing pt's bath. Pt agreeable to therapy and then closed his eyes and fell to sleep. Nursing states pt had a low BS this am but was Within normal; ranges for lunch. Pt unable to stay awake for therapy and reported this to nursing. Juventino cont. Servando Leiva.  Luis Felipe Santamaria PTA

## 2022-09-01 NOTE — CONSULTS
[facility], [address]   Consult Note  [facility] 1 2 3 4 5    Date: 2022   Patient: Claudeen Bare   : 1940   DOA: 2022   MRN: [id number]   ROOM#: [id number]     Reason for Consult: Hematuria with acute blood loss anemia  Requesting Physician:  Dr. Viral Dai  Collaborating Urologist on Call at time of admission: Dr. Martir Figueroa:  Fatigue    History Obtained From:  patient, electronic medical record    HISTORY OF PRESENT ILLNESS:                The patient is a 78 y.o. male with significant past medical history of prostate cancer s/p RPP in , DMII, CKD, left hydronephrosis (managed with ureteral stent, last exchanged 22), and urinary retention (managed with SPT) who presented with fatigue 22. Work-up in the ED revealed sepsis secondary to a UTI. His SPT was exchanged with purulent urine return. Since admission, his hgb has been dropping and is down to 7.4 today. His urine is clear yellow in the bad with no evidence of gross hematuria. ED Provider's HPI 22: Today in the ED I had the pleasure of caring for Claudeen Bare who is a 78 y.o. male that presents today to the emergency department for generalized weakness. Patient does live at home. States has been feeling ill over the last 1 to 2 days. Had marked weakness. And barely able to ambulate or perform his ADLs. He has had nausea vomiting x1 day. Per family patient has been slightly confused. He was recently diagnosed with urinary tract infection and is currently taking antibiotics (unknown which antibiotic. He endorses fevers and chills denies any abdominal pain head pain headache back pain. He denies any rash. Or shortness of breath or chest pain.     Past Medical History:        Diagnosis Date    Acute urinary tract infection 3/15/2012    Ataxia 2010    Diabetes mellitus ([facility] [address] 2011    type 2, controlled    Fusion of spine of cervical region 10/2012    Gait disturbance     History of prostate cancer 1996    adenocarcinoma    History of tobacco use 1959    Hyperlipidemia 2011    Osteoarthritis 2011     Past Surgical History:        Procedure Laterality Date    BLADDER SURGERY      BLADDER SURGERY Left 3/17/2022    CYSTOSCOPY LEFT STENT EXCHANGE performed by Cesar Carrington MD at [facility]  3/28/13    Cysto with removal of artificial sphinter and placement of suprapubic catheter.     PROSTATE SURGERY      PROSTATECTOMY  1996    infection     Current Medications:   Current Facility-Administered Medications: [COMPLETED] meropenem (MERREM) 1,000 mg in sodium chloride 0.9 % 100 mL IVPB (mini-bag), 1,000 mg, IntraVENous, Once **FOLLOWED BY** meropenem (MERREM) 1,000 mg in sodium chloride 0.9 % 100 mL IVPB (mini-bag), 1,000 mg, IntraVENous, Q12H  hydrALAZINE (APRESOLINE) 10 mg in sodium chloride 0.9 % 50 mL ivpb, 10 mg, IntraVENous, Q4H PRN  amLODIPine (NORVASC) tablet 10 mg, 10 mg, Oral, Daily  aspirin chewable tablet 81 mg, 81 mg, Oral, Daily  atorvastatin (LIPITOR) tablet 80 mg, 80 mg, Oral, Nightly  levothyroxine (SYNTHROID) tablet 75 mcg, 75 mcg, Oral, Daily  magnesium hydroxide (MILK OF MAGNESIA) 400 MG/5ML suspension 30 mL, 30 mL, Oral, Daily PRN  docusate sodium (COLACE) capsule 100 mg, 100 mg, Oral, BID  bisacodyl (DULCOLAX) suppository 10 mg, 10 mg, Rectal, Daily  melatonin tablet 10 mg, 10 mg, Oral, Nightly  pantoprazole (PROTONIX) tablet 40 mg, 40 mg, Oral, QAM AC  polyethylene glycol (GLYCOLAX) packet 17 g, 17 g, Oral, BID  pregabalin (LYRICA) capsule 50 mg, 50 mg, Oral, BID  sodium chloride flush 0.9 % injection 5-40 mL, 5-40 mL, IntraVENous, 2 times per day  sodium chloride flush 0.9 % injection 5-40 mL, 5-40 mL, IntraVENous, PRN  0.9 % sodium chloride infusion, , IntraVENous, PRN  enoxaparin (LOVENOX) injection 40 mg, 40 mg, SubCUTAneous, QPM  ondansetron (ZOFRAN-ODT) disintegrating tablet 4 mg, 4 mg, Oral, Q8H PRN **OR** ondansetron (ZOFRAN) injection 4 mg, 4 mg, IntraVENous, Q6H PRN  aluminum & magnesium hydroxide-simethicone (MAALOX) 200-200-20 MG/5ML suspension 30 mL, 30 mL, Oral, Q6H PRN  acetaminophen (TYLENOL) tablet 650 mg, 650 mg, Oral, Q6H PRN **OR** acetaminophen (TYLENOL) suppository 650 mg, 650 mg, Rectal, Q6H PRN  glucose chewable tablet 16 g, 4 tablet, Oral, PRN  dextrose bolus 10% 125 mL, 125 mL, IntraVENous, PRN **OR** dextrose bolus 10% 250 mL, 250 mL, IntraVENous, PRN  glucagon (rDNA) injection 1 mg, 1 mg, SubCUTAneous, PRN  dextrose 10 % infusion, , IntraVENous, Continuous PRN  [Held by provider] insulin glargine (LANTUS) injection vial 22 Units, 0.25 Units/kg, SubCUTAneous, Nightly  insulin lispro (HUMALOG) injection vial 0-4 Units, 0-4 Units, SubCUTAneous, TID WC  insulin lispro (HUMALOG) injection vial 0-4 Units, 0-4 Units, SubCUTAneous, Nightly  ipratropium-albuterol (DUONEB) nebulizer solution 3 mL, 1 vial, Inhalation, BID PRN  0.9 % sodium chloride infusion, , IntraVENous, Continuous    Allergies:  Patient has no known allergies. Social History:   TOBACCO:   reports that he quit smoking about 46 years ago. His smoking use included cigarettes. He smoked an average of .5 packs per day. He has never used smokeless tobacco.  ETOH:   reports no history of alcohol use. DRUGS:   reports no history of drug use.     Family History:       Problem Relation Age of Onset    Cancer Mother     Diabetes Father     Heart Disease Father     High Blood Pressure Father     Diabetes Sister     High Blood Pressure Sister     Cancer Brother         prostate, breast    Diabetes Brother     Cancer Maternal Uncle     Diabetes Maternal Grandmother     Stroke Maternal Grandfather        REVIEW OF SYSTEMS:     CONSTITUTIONAL:  positive for  fatigue  RESPIRATORY:  negative  CARDIOVASCULAR:  negative  GASTROINTESTINAL:  negative  GENITOURINARY:  negative    PHYSICAL EXAM:      VITALS:  BP (!) 177/69   Pulse 80   Temp 99.3 °F (37.4 °C) (Axillary)   Resp 16   Ht 5' 8\" (1.727 m)   Wt 206 lb (93.4 kg)   SpO2 97%   BMI 31.32 kg/m²      TEMPERATURE:  Current - Temp: 99.3 °F (37.4 °C); Max - Temp  Av.8 °F (37.1 °C)  Min: 98.2 °F (36.8 °C)  Max: 99.3 °F (37.4 °C)  24HR BLOOD PRESSURE RANGE:  Systolic (19EVQ), DNT:964 , Min:135 , KCV:752   ; Diastolic (35LDY), SNH:81, Min:68, Max:70    Physical Exam:  General appearance: alert, appears stated age, cooperative, fatigued, no distress, mildly obese and slowed mentation  Head: Normocephalic, without obvious abnormality, atraumatic  Back: No CVA tenderness  Abdomen: Soft, non-tender, non-distended. 16fr SPt in place, urine clear yellow with some debris noted in tubing. DATA:    WBC:    Lab Results   Component Value Date/Time    WBC 5.5 2022 03:49 AM     Hemoglobin/Hematocrit:    Lab Results   Component Value Date/Time    HGB 7.4 2022 03:49 AM    HCT 25.0 2022 03:49 AM     BMP:    Lab Results   Component Value Date/Time     2022 03:49 AM    K 4.3 2022 03:49 AM    K 3.9 2018 04:42 AM     2022 03:49 AM    CO2 19 2022 03:49 AM    BUN 25 2022 03:49 AM    LABALBU 2.8 2022 03:49 AM    CREATININE 1.8 2022 03:49 AM    CALCIUM 8.0 2022 03:49 AM    GFRAA 44 2022 03:49 AM    LABGLOM 36 2022 03:49 AM     PT/INR:    Lab Results   Component Value Date/Time    PROTIME 12.5 2022 09:51 AM    PROTIME 15.2 2011 06:48 PM    INR 0.97 2022 09:51 AM     Blood Culture: 4/4 bottles +ESBL E.coli  Urine Culture: >50K CFU/ml mixed growth    Imaging:  XR CHEST PORTABLE    Result Date: 2022  EXAMINATION: ONE XRAY VIEW OF THE CHEST 2022 3:37 pm COMPARISON: 2022 HISTORY: ORDERING SYSTEM PROVIDED HISTORY: emergency TECHNOLOGIST PROVIDED HISTORY: Reason for exam:->emergency Reason for Exam: fatigue, emergency, fever FINDINGS: Increased interstitial opacities seen throughout both lungs.   A focal infiltrate is not identified. The cardial pericardial silhouette is unremarkable. No pneumothorax is seen. No free air. No acute bony abnormality. Increased interstitial opacity. While much or all of this may be chronic, please correlate with any clinical evidence of acute interstitial edema. Assessment & Plan:      Aftab Ada is a 78y.o. year old male admitted 8/05/6648 for metabolic encephalopathy. 1) Chronic Urinary Retention: managed with suprapubic catheter and exchanged monthly, last exchanged upon admission 8/28/22. Recommend SPT exchanges q3 weeks. 2) Chronic Left Hydronephrosis: s/p cystoscopy, left ureteral stent exchange 6/29/22. S/p cystoscopy, left ureteral stent placement 7/4/21              The pt will need q3 month left ureteral stent exchanges. We may perform his next stent exchange while inpatient depending on hospital course. CT a/p pending  3) Sepsis w Complicated UTI: urine cx 8/29/22 >50K CFU/ml mixed growth, likely contaminated. Blood cultures 4/4 +ESBL E.coli              WBC 5.5, afebrile              On IV Merrem  4) H/o Prostate Cancer: S/p RPP with Dr. Luis Alfredo Perkins in [address] [place] [address] PSA 0.93 2/2021. On Eligard q6 months    Will follow. Patient seen and examined, chart reviewed.      Electronically signed by Andree Garsia PA-C on 9/1/2022 at 10:10 AM

## 2022-09-01 NOTE — CONSULTS
Endocrinology   Consult Note  8/28/2022  6:18 PM     Primary Care provider: Ever Lopez MD     Referring physician:  Jennifer Browne MD     Dear Doctor Marquez Rodrigues for the Consult     Pt. Was Admitted for : Fatigue and possible sepsis from UTI    Reason for Consult: Control of blood glucose as patient has having hypoglycemic episodes      History Obtained From:  Patient/ EMR       HISTORY OF PRESENT ILLNESS:                The patient is a 78 y.o. male with significant past medical history of mellitus, abnormal gait, hyperlipidemia, acute urinary tract infection with episodes of retention osteoarthritis bladder's surgery also has prostate surgery presented complaining of increasing fatigue was found to be infected with urosepsis patient has had an episode of hypoglycemia he is not eating well but he getting his insulin. I was  consulted for better control of blood glucose. ROS:   Pt's ROS done in detail. Abnormal ROS are noted in Medical and Surgical History Section below: Other Medical History:        Diagnosis Date    Acute urinary tract infection 3/15/2012    Ataxia 2010    Diabetes mellitus ([address] 2011    type 2, controlled    Fusion of spine of cervical region 10/2012    Gait disturbance 2011    History of prostate cancer 1996    adenocarcinoma    History of tobacco use 1959    Hyperlipidemia 2011    Osteoarthritis 2011     Surgical History:        Procedure Laterality Date    BLADDER SURGERY      BLADDER SURGERY Left 3/17/2022    CYSTOSCOPY LEFT STENT EXCHANGE performed by Selena Price MD at [facility]  3/28/13    Cysto with removal of artificial sphinter and placement of suprapubic catheter. PROSTATE SURGERY      PROSTATECTOMY  1996    infection       Allergies:  Patient has no known allergies.     Family History:       Problem Relation Age of Onset    Cancer Mother     Diabetes Father     Heart Disease Father     High Blood Pressure Father     Diabetes Sister     High Blood Pressure Sister     Cancer Brother         prostate, breast    Diabetes Brother     Cancer Maternal Uncle     Diabetes Maternal Grandmother     Stroke Maternal Grandfather      REVIEW OF SYSTEMS:  Review of System Done as noted above     PHYSICAL EXAM:      Vitals:    BP (!) 177/69   Pulse 80   Temp 99.3 °F (37.4 °C) (Axillary)   Resp 16   Ht 5' 8\" (1.727 m)   Wt 206 lb (93.4 kg)   SpO2 97%   BMI 31.32 kg/m²     CONSTITUTIONAL:  awake, alert, cooperative, appears stated age   EYES:  vision intact Fundoscopic Exam not performed   ENT:Normal  NECK:  Supple, No JVD. Thyroid Exam:Normal   LUNGS:  Has Vesicular Breath Sounds,   CARDIOVASCULAR:  Normal apical impulse, regular rate and rhythm, normal S1 and S2, no S3 or S4, and has no  murmur   ABDOMEN:  No scars, normal bowel sounds, soft, non-distended, non-tender, no masses palpated, no hepatolienomegaly  Musculoskeletal: Normal  Extremities: Normal, peripheral pulses normal, , has no edema   NEUROLOGIC:  Awake, alert, oriented to name, place and time. Cranial nerves II-XII are grossly intact. Motor is  intact. Sensory is intact.  ,  and gait is normal.    DATA:    CBC:   Recent Labs     08/30/22  0445 08/31/22  1122 09/01/22  0349   WBC 13.7* 6.3 5.5   HGB 8.1* 7.6* 7.4*    148 155    CMP:  Recent Labs     08/30/22  0445 08/31/22  1122 09/01/22  0349    140 138   K 4.3 4.2 4.3    113* 112*   CO2 21 19* 19*   BUN 35* 29* 25*   CREATININE 1.8* 1.8* 1.8*   CALCIUM 8.2* 7.9* 8.0*   PROT  --  4.8* 4.9*   LABALBU  --  2.7* 2.8*   BILITOT  --  0.2 0.2   ALKPHOS  --  83 91   AST  --  16 25   ALT  --  10 16     Lipids: No results found for: CHOL, HDL, TRIG  Glucose:   Recent Labs     09/01/22  0828 09/01/22  0927 09/01/22  1154   POCGLU 122* 172* 142*     Hemoglobin A1C:   Lab Results   Component Value Date/Time    LABA1C 7.8 08/29/2022 01:30 AM     Free T4:   Lab Results   Component Value Date/Time T4FREE 1.15 03/27/2022 08:22 AM     Free T3: No results found for: FT3  TSH High Sensitivity:   Lab Results   Component Value Date/Time    TSHHS 7.590 04/28/2022 06:59 AM       XR CHEST PORTABLE   Final Result   Increased interstitial opacity. While much or all of this may be chronic,   please correlate with any clinical evidence of acute interstitial edema.          CT ABDOMEN PELVIS WO CONTRAST Additional Contrast? None    (Results Pending)          Scheduled Medicines   Medications:    insulin lispro  0-4 Units SubCUTAneous 2 times per day    insulin glargine  10 Units SubCUTAneous Nightly    meropenem  1,000 mg IntraVENous Q12H    amLODIPine  10 mg Oral Daily    aspirin  81 mg Oral Daily    atorvastatin  80 mg Oral Nightly    levothyroxine  75 mcg Oral Daily    docusate sodium  100 mg Oral BID    bisacodyl  10 mg Rectal Daily    melatonin  10 mg Oral Nightly    pantoprazole  40 mg Oral QAM AC    polyethylene glycol  17 g Oral BID    pregabalin  50 mg Oral BID    sodium chloride flush  5-40 mL IntraVENous 2 times per day    enoxaparin  40 mg SubCUTAneous QPM    insulin lispro  0-4 Units SubCUTAneous TID WC      Infusions:    sodium chloride      dextrose      sodium chloride 100 mL/hr at 09/01/22 1240         IMPRESSION    Patient Active Problem List   Diagnosis    Gait disturbance    Generalized weakness    Diabetes mellitus (HCC)    Osteoarthritis    Anemia of chronic disorder    Infected implanted bladder sphincter cuff    Escherichia coli urinary tract infection    Hypertension    Sepsis ([address]    Acute encephalopathy    Type 2 diabetes mellitus with hypoglycemia without coma (HCC)    UTI (urinary tract infection) due to urinary indwelling catheter (HCC)    Hyperkalemia    Acute kidney failure (HCC)    Leg weakness, bilateral    Type 2 diabetes mellitus with neurological manifestations, uncontrolled ([address]    Complicated UTI (urinary tract infection)    Essential hypertension    Severe muscle deconditioning 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 (HCC)    Frequent falls    Chronic kidney disease, stage 3a ([address]    Uncontrolled type 2 diabetes mellitus with peripheral neuropathy ([address]    Prostate cancer ([facility] [address]    Suprapubic catheter ([facility] [address]    Sepsis due to urinary tract infection ([facility] [address]    Coagulase negative Staphylococcus bacteremia    Chronic kidney disease, stage IV (severe) (HCC)    Acute metabolic encephalopathy    SVT (supraventricular tachycardia) (HCC)    Metabolic encephalopathy    History of prostate cancer    Cigarette nicotine dependence without complication    Altered mental status    Serratia marcescens infection    Hypoglycemia         RECOMMENDATIONS:      Reviewed POC blood glucose . Labs and X ray results   Reviewed Home and Current Medicines   Will Start On Correction bolus Humalog/ Lantus Insulin regime  Monitor Blood glucose frequently   Modify  the dose of Insulin as needed        Will follow with you  Again thank you for sharing pt's care with me.      Truly yours,       Sharmin Baugh MD

## 2022-09-02 LAB
ALBUMIN SERPL-MCNC: 2.8 GM/DL (ref 3.4–5)
ALP BLD-CCNC: 96 IU/L (ref 40–128)
ALT SERPL-CCNC: 17 U/L (ref 10–40)
ANION GAP SERPL CALCULATED.3IONS-SCNC: 9 MMOL/L (ref 4–16)
AST SERPL-CCNC: 21 IU/L (ref 15–37)
BILIRUB SERPL-MCNC: 0.2 MG/DL (ref 0–1)
BUN BLDV-MCNC: 22 MG/DL (ref 6–23)
CALCIUM SERPL-MCNC: 8.2 MG/DL (ref 8.3–10.6)
CHLORIDE BLD-SCNC: 111 MMOL/L (ref 99–110)
CO2: 21 MMOL/L (ref 21–32)
CREAT SERPL-MCNC: 1.9 MG/DL (ref 0.9–1.3)
GFR AFRICAN AMERICAN: 41 ML/MIN/1.73M2
GFR NON-AFRICAN AMERICAN: 34 ML/MIN/1.73M2
GLUCOSE BLD-MCNC: 100 MG/DL (ref 70–99)
GLUCOSE BLD-MCNC: 122 MG/DL (ref 70–99)
GLUCOSE BLD-MCNC: 134 MG/DL (ref 70–99)
GLUCOSE BLD-MCNC: 135 MG/DL (ref 70–99)
GLUCOSE BLD-MCNC: 153 MG/DL (ref 70–99)
GLUCOSE BLD-MCNC: 71 MG/DL (ref 70–99)
GLUCOSE BLD-MCNC: 97 MG/DL (ref 70–99)
HCT VFR BLD CALC: 26.2 % (ref 42–52)
HEMOGLOBIN: 8.2 GM/DL (ref 13.5–18)
MCH RBC QN AUTO: 30.1 PG (ref 27–31)
MCHC RBC AUTO-ENTMCNC: 31.3 % (ref 32–36)
MCV RBC AUTO: 96.3 FL (ref 78–100)
PDW BLD-RTO: 15 % (ref 11.7–14.9)
PLATELET # BLD: 184 K/CU MM (ref 140–440)
PMV BLD AUTO: 10.9 FL (ref 7.5–11.1)
POTASSIUM SERPL-SCNC: 4.3 MMOL/L (ref 3.5–5.1)
RBC # BLD: 2.72 M/CU MM (ref 4.6–6.2)
SODIUM BLD-SCNC: 141 MMOL/L (ref 135–145)
TOTAL PROTEIN: 5.2 GM/DL (ref 6.4–8.2)
WBC # BLD: 6 K/CU MM (ref 4–10.5)

## 2022-09-02 PROCEDURE — 97530 THERAPEUTIC ACTIVITIES: CPT

## 2022-09-02 PROCEDURE — 1200000000 HC SEMI PRIVATE

## 2022-09-02 PROCEDURE — 6360000002 HC RX W HCPCS: Performed by: HOSPITALIST

## 2022-09-02 PROCEDURE — 36415 COLL VENOUS BLD VENIPUNCTURE: CPT

## 2022-09-02 PROCEDURE — 82962 GLUCOSE BLOOD TEST: CPT

## 2022-09-02 PROCEDURE — 6370000000 HC RX 637 (ALT 250 FOR IP): Performed by: STUDENT IN AN ORGANIZED HEALTH CARE EDUCATION/TRAINING PROGRAM

## 2022-09-02 PROCEDURE — 2580000003 HC RX 258: Performed by: STUDENT IN AN ORGANIZED HEALTH CARE EDUCATION/TRAINING PROGRAM

## 2022-09-02 PROCEDURE — 85027 COMPLETE CBC AUTOMATED: CPT

## 2022-09-02 PROCEDURE — 76937 US GUIDE VASCULAR ACCESS: CPT

## 2022-09-02 PROCEDURE — 94761 N-INVAS EAR/PLS OXIMETRY MLT: CPT

## 2022-09-02 PROCEDURE — 6360000002 HC RX W HCPCS: Performed by: STUDENT IN AN ORGANIZED HEALTH CARE EDUCATION/TRAINING PROGRAM

## 2022-09-02 PROCEDURE — 2580000003 HC RX 258: Performed by: HOSPITALIST

## 2022-09-02 PROCEDURE — 6370000000 HC RX 637 (ALT 250 FOR IP): Performed by: HOSPITALIST

## 2022-09-02 PROCEDURE — 80053 COMPREHEN METABOLIC PANEL: CPT

## 2022-09-02 RX ADMIN — Medication 10 MG: at 20:16

## 2022-09-02 RX ADMIN — MEROPENEM 1000 MG: 1 INJECTION, POWDER, FOR SOLUTION INTRAVENOUS at 12:57

## 2022-09-02 RX ADMIN — DOCUSATE SODIUM 100 MG: 100 CAPSULE, LIQUID FILLED ORAL at 08:57

## 2022-09-02 RX ADMIN — MEROPENEM 1000 MG: 1 INJECTION, POWDER, FOR SOLUTION INTRAVENOUS at 01:20

## 2022-09-02 RX ADMIN — ENOXAPARIN SODIUM 40 MG: 100 INJECTION SUBCUTANEOUS at 18:14

## 2022-09-02 RX ADMIN — PANTOPRAZOLE SODIUM 40 MG: 40 TABLET, DELAYED RELEASE ORAL at 05:57

## 2022-09-02 RX ADMIN — POLYETHYLENE GLYCOL (3350) 17 G: 17 POWDER, FOR SOLUTION ORAL at 20:16

## 2022-09-02 RX ADMIN — DOCUSATE SODIUM 100 MG: 100 CAPSULE, LIQUID FILLED ORAL at 20:15

## 2022-09-02 RX ADMIN — POLYETHYLENE GLYCOL (3350) 17 G: 17 POWDER, FOR SOLUTION ORAL at 08:56

## 2022-09-02 RX ADMIN — ATORVASTATIN CALCIUM 80 MG: 40 TABLET, FILM COATED ORAL at 20:15

## 2022-09-02 RX ADMIN — AMLODIPINE BESYLATE 10 MG: 10 TABLET ORAL at 08:57

## 2022-09-02 RX ADMIN — SODIUM CHLORIDE, PRESERVATIVE FREE 10 ML: 5 INJECTION INTRAVENOUS at 08:57

## 2022-09-02 RX ADMIN — PREGABALIN 50 MG: 50 CAPSULE ORAL at 20:15

## 2022-09-02 RX ADMIN — BISACODYL 10 MG: 10 SUPPOSITORY RECTAL at 08:56

## 2022-09-02 RX ADMIN — MEROPENEM 1000 MG: 1 INJECTION, POWDER, FOR SOLUTION INTRAVENOUS at 23:40

## 2022-09-02 RX ADMIN — LEVOTHYROXINE SODIUM 75 MCG: 75 TABLET ORAL at 06:11

## 2022-09-02 RX ADMIN — ASPIRIN 81 MG CHEWABLE TABLET 81 MG: 81 TABLET CHEWABLE at 08:57

## 2022-09-02 RX ADMIN — PREGABALIN 50 MG: 50 CAPSULE ORAL at 08:56

## 2022-09-02 NOTE — PROGRESS NOTES
Hospitalist Progress Note      Name:  Abby Mckee /Age/Sex: 1940  (78 y.o. male)   MRN & CSN:  [id number] & [id number] Admission Date/Time: 2022  6:18 PM   Location:  4967/6955-Z PCP: Victor Manuel Pacheco MD         Hospital Day: 6    Assessment and Plan:     43-year-old male with history of prostate cancer, diabetes mellitus, essential hypertension who was admitted with fatigue overnight. History of suprapubic catheter, UA positive for cystitis, was started on cefepime, blood culture positive for E. Coli    ESBL bacteremia: Urine culture on  growing E. coli resistant to Cipro and cefepime  With leukocytosis and fever  Continue IV fluid  Switched IV antibiotic to IV meropenem  Consult ID  Urine culture on  showing no significant growth  Order CT abdomen pelvis to look for the source of the infection could be gallbladder as the patient has abdominal pain  showing    Left ureteral stent in place. Mild bilateral urinary tract dilatation   with new right perinephric and periureteral stranding raises concern for   urinary tract infection. No urinary stones. 2.  Suprapubic catheter in the decompressed urinary bladder. 3.  Ill-defined stranding/fluid throughout the retroperitoneum may relate to   vascular congestion or reactive changes. No intraperitoneal free air or   abscess.    Order US abdomen to r/o GB pathology   order another 2 sets blood culture today   Reported hematuria on admission hemoglobin 8.1    Hemoglobin down up to 8.2  Consult urologist for hematuria  Seems hematuria resolved  Monitor CBC daily and transfuse for hemoglobin less than 7      History of prostate cancer status post prostatectomy     Hypertension: Blood pressure stable continue on current medication    Hypothyroidism continue levothyroxine    CKD with serum creatinine 1.9 close to his baseline  Continue IV fluid and monitor renal function  Avoid nephrotoxic  Patient with history of prostate cancer        Type 2 diabetes: Patient has episode of low blood sugar   Restarted on small dose of Lantus 10 units nightly by endocrinologist  on hypoglycemia protocol  Continue on sliding scale correction for now  Check blood sugar ACH S    Diet ADULT ORAL NUTRITION SUPPLEMENT; Breakfast; Other Oral Supplement; Kefir  ADULT ORAL NUTRITION SUPPLEMENT; Lunch, Dinner; Low Calorie/High Protein Oral Supplement  ADULT DIET; Dysphagia - Soft and Bite Sized; 5 carb choices (75 gm/meal); Low Fat/Low Chol/High Fiber/JORGE   DVT Prophylaxis [x] Lovenox, []  Heparin, [] SCDs, [] Ambulation   GI Prophylaxis [] PPI,  [] H2 Blocker,  [] Carafate,  [] Diet/Tube Feeds   Code Status Full Code   Disposition Patient requires continued admission due to    MDM [] Low, [] Moderate,[]  High  Patient's risk as above due to      History of Present Illness: The patient was seen and examined at the bedside  Patient looks tired and weak  Patient has episodes of low blood sugar this  with a twitching of his mouth most likely due to hypoglycemia  Resolved today  Still complaining of abdominal pressure  CT of the pelvis show evidence of bilateral pyonephritis    Objective: Intake/Output Summary (Last 24 hours) at 9/2/2022 1022  Last data filed at 9/2/2022 0352  Gross per 24 hour   Intake --   Output 1800 ml   Net -1800 ml        Vitals:   Vitals:    09/02/22 0845   BP: (!) 160/70   Pulse: 86   Resp: 18   Temp: 97.6 °F (36.4 °C)   SpO2:      Physical Exam:   GEN Awake.  Alert , not in respiratory distress, not in pain  HEENT: PEERLA, , supple neck,   Chest: air entry equal bilaterally, no wheezing or crepitation  Heart: S1 and S2 heard, no murmur, no gallop or rub, regular rate  Abdomen: soft, ND , tender without rigidity +BS  Extremities: no cyanosis, tenderness or erythema, peripheral pulses audible  Neurology: alert, confused, able to move 4 limbs    Medications:   Medications:    insulin lispro  0-4 Units SubCUTAneous 2 times per day    insulin glargine  10 Units SubCUTAneous Nightly    meropenem  1,000 mg IntraVENous Q12H    amLODIPine  10 mg Oral Daily    aspirin  81 mg Oral Daily    atorvastatin  80 mg Oral Nightly    levothyroxine  75 mcg Oral Daily    docusate sodium  100 mg Oral BID    bisacodyl  10 mg Rectal Daily    melatonin  10 mg Oral Nightly    pantoprazole  40 mg Oral QAM AC    polyethylene glycol  17 g Oral BID    pregabalin  50 mg Oral BID    sodium chloride flush  5-40 mL IntraVENous 2 times per day    enoxaparin  40 mg SubCUTAneous QPM    insulin lispro  0-4 Units SubCUTAneous TID WC      Infusions:    sodium chloride      dextrose      sodium chloride 100 mL/hr at 09/01/22 1753     PRN Meds: hydrALAZINE (APRESOLINE) ivpb, 10 mg, Q4H PRN  magnesium hydroxide, 30 mL, Daily PRN  sodium chloride flush, 5-40 mL, PRN  sodium chloride, , PRN  ondansetron, 4 mg, Q8H PRN   Or  ondansetron, 4 mg, Q6H PRN  aluminum & magnesium hydroxide-simethicone, 30 mL, Q6H PRN  acetaminophen, 650 mg, Q6H PRN   Or  acetaminophen, 650 mg, Q6H PRN  glucose, 4 tablet, PRN  dextrose bolus, 125 mL, PRN   Or  dextrose bolus, 250 mL, PRN  glucagon (rDNA), 1 mg, PRN  dextrose, , Continuous PRN  ipratropium-albuterol, 1 vial, BID PRN        Electronically signed by Fracisco Stephen MD on 9/2/2022 at 10:22 AM

## 2022-09-02 NOTE — PROGRESS NOTES
Occupational Therapy  Attempted to see pt on this date for OT session. Pt declined session at this time due to increased fatigue. Pt requested we return the next day in the am.  Pt needs met and will attempt as able and appropriate.     Jodie SINGH

## 2022-09-02 NOTE — PROGRESS NOTES
Physical Therapy    Physical Therapy Treatment Note  Name: Silva Freire MRN: [id number] :   1940   Date:  2022   Admission Date: 2022 Room:  [address] [place] [address]   Restrictions/Precautions:          contact precautions, falls   Communication with other providers:  RN   Subjective:  Patient states:  \"I'm just so tired\"   Pain:   Location, Type, Intensity (0/10 to 10/10):  7/10 right arm (from IV)  Objective:    Observation:    Supine in bed. Cooperative with therapy though limited due to significant fatigue with mobility    Treatment, including education/measures:  Rolling: maxA bilat directions. Heavy facilitation at trunk and hips. Cues for use of bed rail to hold side lying position but still needed mod-maxA to sustain. Supine to sit: maxA for bilat LE guidance to EOB, hip scooting, and uprighting trunk  Sit to supine: maxA for bilat LE advancement and trunk guidance   Sitting balance: x ~7 minutes at EOB maxA with heavy posterior and left lateral lean progressing to CGA. Set up assist of BUE placement on EOB for better support. Pt demonstrated a significant amount of fatigue with fwd posture and inability to upright self. Unable to successfully complete stand from EOB this date due to significant fatigue. Unable to maintain  on RW or upward gaze. Educated pt on POC, role of PT, DME use, discharge   Assessment / Impression:    Patient's tolerance of treatment: fair   Adverse Reaction: no  Significant change in status and impact:  very weak and fatigued compared to last PT session.  Unable to tolerate OOB mobility today   Barriers to improvement:  activity tolerance ,strength, balance, medical hx with frequent urinary infections   Plan for Next Session:    Activity tolerance, strength, balance, gait (if able to tolerate),transfers, bed mobility   Time in:  0950  Time out:  1016  Timed treatment minutes:  26  Total treatment time:  26 minutes     Previously filed items:  Social/Functional History  Lives With: Son  Home Equipment: Rollator        Short Term Goals  Time Frame for Short term goals: 2 weeks  Short term goal 1: Pt will perform sit><supine maxA  Short term goal 2: Pt will perform static sitting x 5 minutes SBA with BUE support  Short term goal 3: Pt will transfer between surfaces maxA    Electronically signed by:    Lesley Rios [id number]  9/2/2022, 1:07 PM

## 2022-09-02 NOTE — CARE COORDINATION
ARU reviewed most recent therapy note. Patient with poor activity tolerance. Patient not ARU appropriate d/t above. Discussed with Paula Linares.

## 2022-09-02 NOTE — PROGRESS NOTES
Progress Note( Dr. Davis Vences)  9/2/2022  Subjective:   Admit Date: 8/28/2022  PCP: Victor Manuel Pacheco MD    Admitted For :Fatigue and possible sepsis from UTI    Consulted For: Control of blood glucose as patient has having hypoglycemic episodes    Interval History: Somewhat better had 1 episode of low blood glucose yesterday    Denies any chest pains,   Denies SOB . Denies nausea or vomiting. No new bowel or bladder symptoms. Intake/Output Summary (Last 24 hours) at 9/2/2022 0851  Last data filed at 9/2/2022 0352  Gross per 24 hour   Intake --   Output 1800 ml   Net -1800 ml       DATA    CBC:   Recent Labs     08/31/22  1122 09/01/22  0349 09/02/22  0559   WBC 6.3 5.5 6.0   HGB 7.6* 7.4* 8.2*    155 184    CMP:  Recent Labs     08/31/22  1122 09/01/22  0349 09/02/22  0559    138 141   K 4.2 4.3 4.3   * 112* 111*   CO2 19* 19* 21   BUN 29* 25* 22   CREATININE 1.8* 1.8* 1.9*   CALCIUM 7.9* 8.0* 8.2*   PROT 4.8* 4.9* 5.2*   LABALBU 2.7* 2.8* 2.8*   BILITOT 0.2 0.2 0.2   ALKPHOS 83 91 96   AST 16 25 21   ALT 10 16 17     Lipids: No results found for: CHOL, HDL, TRIG  Glucose:  Recent Labs     09/01/22 1953 09/02/22  0144 09/02/22  0803   POCGLU 86 100* 97     [id number]:  Lab Results   Component Value Date/Time    LABA1C 7.8 08/29/2022 01:30 AM     High Sensitivity TSH:   Lab Results   Component Value Date/Time    TSHHS 7.590 04/28/2022 06:59 AM     Free T3: No results found for: FT3  Free T4:  Lab Results   Component Value Date/Time    T4FREE 1.15 03/27/2022 08:22 AM       CT ABDOMEN PELVIS WO CONTRAST Additional Contrast? None   Final Result   1. Left ureteral stent in place. Mild bilateral urinary tract dilatation   with new right perinephric and periureteral stranding raises concern for   urinary tract infection. No urinary stones. 2.  Suprapubic catheter in the decompressed urinary bladder.       3.  Ill-defined stranding/fluid throughout the retroperitoneum may relate to vascular congestion or reactive changes. No intraperitoneal free air or   abscess. XR CHEST PORTABLE   Final Result   Increased interstitial opacity. While much or all of this may be chronic,   please correlate with any clinical evidence of acute interstitial edema. Scheduled Medicines   Medications:    insulin lispro  0-4 Units SubCUTAneous 2 times per day    insulin glargine  10 Units SubCUTAneous Nightly    meropenem  1,000 mg IntraVENous Q12H    amLODIPine  10 mg Oral Daily    aspirin  81 mg Oral Daily    atorvastatin  80 mg Oral Nightly    levothyroxine  75 mcg Oral Daily    docusate sodium  100 mg Oral BID    bisacodyl  10 mg Rectal Daily    melatonin  10 mg Oral Nightly    pantoprazole  40 mg Oral QAM AC    polyethylene glycol  17 g Oral BID    pregabalin  50 mg Oral BID    sodium chloride flush  5-40 mL IntraVENous 2 times per day    enoxaparin  40 mg SubCUTAneous QPM    insulin lispro  0-4 Units SubCUTAneous TID WC      Infusions:    sodium chloride      dextrose      sodium chloride 100 mL/hr at 09/01/22 1753         Objective:   Vitals: BP (!) 144/68   Pulse 84   Temp 98.6 °F (37 °C) (Oral)   Resp 16   Ht 5' 8\" (1.727 m)   Wt 206 lb (93.4 kg)   SpO2 98%   BMI 31.32 kg/m²   General appearance: alert and cooperative with exam  Neck: no JVD or bruit  Thyroid : Normal lobes   Lungs: Has Vesicular Breath sounds   Heart:  regular rate and rhythm  Abdomen: soft, non-tender; bowel sounds normal; no masses,  no organomegaly  Musculoskeletal: Normal  Extremities: extremities normal, , no edema  Neurologic:  Awake, alert, oriented to name, place and time. Cranial nerves II-XII are grossly intact. Motor weakness. Sensory neuropathy. ,  and gait is abnormal.  Stable    Assessment:     Patient Active Problem List:     Gait disturbance     Generalized weakness     Diabetes mellitus (HCC)     Osteoarthritis     Anemia of chronic disorder     Infected implanted bladder sphincter cuff Escherichia coli urinary tract infection     Hypertension     Sepsis ([address]     Acute encephalopathy     Type 2 diabetes mellitus with hypoglycemia without coma (HCC)     UTI (urinary tract infection) due to urinary indwelling catheter (HCC)     Hyperkalemia     Acute kidney failure (HCC)     Leg weakness, bilateral     Type 2 diabetes mellitus with neurological manifestations, uncontrolled ([address]     Complicated UTI (urinary tract infection)     Essential hypertension     Severe muscle deconditioning     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 (HCC)     Frequent falls     Chronic kidney disease, stage 3a ([address]     Uncontrolled type 2 diabetes mellitus with peripheral neuropathy (HCC)     Prostate cancer (HCC)     Suprapubic catheter [address]     Sepsis due to urinary tract infection ([facility]     Coagulase negative Staphylococcus bacteremia     Chronic kidney disease, stage IV (severe) (HCC)     Acute metabolic encephalopathy     SVT (supraventricular tachycardia) (HCC)     Metabolic encephalopathy     History of prostate cancer     Cigarette nicotine dependence without complication     Altered mental status     Serratia marcescens infection     Hypoglycemia      Plan:     Reviewed POC blood glucose . Labs and X ray results   Reviewed Current Medicines   On Correction bolus Humalog/ Basal Lantus Insulin regime /   Monitor Blood glucose frequently   Modified  the dose of Insulin/ other medicines as needed   Will follow     .      Saji Mayorga MD, MD

## 2022-09-02 NOTE — PROGRESS NOTES
Comprehensive Nutrition Assessment    Type and Reason for Visit:  Initial    Nutrition Recommendations/Plan:    Liberalized 2 gram NA restriction to no added salt to help encourage intake. Additional diet restrictions prn   Ordered kefir while on atb. Ordered nutrition supplements to help optimize intake. Please encourage and record PO intake TID      Malnutrition Assessment:  Malnutrition Status: At risk for malnutrition (Comment) (09/02/22 0912)    Context:  Acute Illness     Findings of the 6 clinical characteristics of malnutrition:  Weight loss - not clinically significant. Decreased oral intake     Nutrition Assessment:    Pt admitted with lethargy, UTI and sepsis. On atb. Sporatic meal intakes from 1-100%. Will begin oral nutrition supplements and monitor clinical course. Nutrition Related Findings:    H/H 8.2/26.2 Wound Type: None       Current Nutrition Intake & Therapies:    Average Meal Intake: 0%, %  Average Supplements Intake: None Ordered  ADULT DIET; Dysphagia - Soft and Bite Sized; 5 carb choices (75 gm/meal); Low Fat/Low Chol/High Fiber/JORGE; Low Sodium (2 gm)    Anthropometric Measures:  Height: 5' 8\" (172.7 cm)  Ideal Body Weight (IBW): 154 lbs (70 kg)    Admission Body Weight: 198 lb (89.8 kg) (stated)  Current Body Weight: 206 lb (93.4 kg), 133.8 % IBW. Weight Source: Bed Scale  Current BMI (kg/m2): 31.3  Usual Body Weight: 210 lb (95.3 kg) (Feb 2022)  % Weight Change (Calculated): -1.9                    BMI Categories: Obese Class 1 (BMI 30.0-34. 9)    Estimated Daily Nutrient Needs:  Energy Requirements Based On: Formula  Weight Used for Energy Requirements: Current  Energy (kcal/day): 2079  Weight Used for Protein Requirements: Ideal  Protein (g/day):   Method Used for Fluid Requirements: 1 ml/kcal  Fluid (ml/day): 2079    Nutrition Diagnosis:   Predicted inadequate energy intake related to increase demand for energy/nutrients (lethargy and sepsis) as evidenced by weight loss    Nutrition Interventions:   Food and/or Nutrient Delivery: Start Oral Diet  Nutrition Education/Counseling: No recommendation at this time  Coordination of Nutrition Care: Continue to monitor while inpatient       Goals:     Goals: Meet at least 75% of estimated needs       Nutrition Monitoring and Evaluation:   Behavioral-Environmental Outcomes: None Identified  Food/Nutrient Intake Outcomes: Food and Nutrient Intake, Supplement Intake  Physical Signs/Symptoms Outcomes: Biochemical Data, GI Status, Skin, Weight    Discharge Planning:     Too soon to determine     Haylee Glass RD, LD  Contact: [phone]

## 2022-09-02 NOTE — CARE COORDINATION
Discussed discharge disposition with Avani from ARU and pt has not progressed enough to show that he could tolerate 3hrs therapy per day at ARU so she is declining referral at this time. Met c pt at bedside, he expressed understanding and agreed to [facility]. I called pt's son Pola Cross [phone] and he was also in agreement to referral to [facility]. Referral sent to Tan Alicia at [facility] to review. Upon discharge Pola Cross would like to be updated on plan.

## 2022-09-02 NOTE — PROGRESS NOTES
[facility] [address]   Progress Note  [facility] 0 1 2      Date: 2022   Patient: Sophia Russ   : 1940   DOA: 2022   MRN: [id number]   ROOM#: [id number]     Admit Date: 2022     Collaborating Urologist on Call at time of admission: Dr. Иван Fitch  CC: Fatigue  Reason for Consult: Hematuria with acute blood loss anemia    Subjective:     Pain: mild, no nausea and no vomiting,   Bowel Movement/Flatus:   Yes  Voiding:  SPT in place, urine clear yellow w some debris noted in tubing     Pt resting in bed, states he feels overall crummy this morning. Denies flank/abdominal pain. Objective:    Vitals:    BP (!) 144/68   Pulse 84   Temp 98.6 °F (37 °C) (Oral)   Resp 16   Ht 5' 8\" (1.727 m)   Wt 206 lb (93.4 kg)   SpO2 98%   BMI 31.32 kg/m²    Temp  Av.6 °F (37 °C)  Min: 98.6 °F (37 °C)  Max: 98.6 °F (37 °C)     Intake/Output Summary (Last 24 hours) at 2022 0853  Last data filed at 2022 0352  Gross per 24 hour   Intake --   Output 1800 ml   Net -1800 ml     Physical Exam:  General appearance: alert, appears stated age, cooperative, fatigued, no distress, mildly obese and slowed mentation  Head: Normocephalic, without obvious abnormality, atraumatic  Back: No CVA tenderness  Abdomen: Soft, non-tender, non-distended. 16fr SPt in place, urine clear yellow with some debris noted in tubing.     Labs:   WBC:    Lab Results   Component Value Date/Time    WBC 6.0 2022 05:59 AM      Hemoglobin/Hematocrit:    Lab Results   Component Value Date/Time    HGB 8.2 2022 05:59 AM    HCT 26.2 2022 05:59 AM      BMP:   Lab Results   Component Value Date/Time     2022 05:59 AM    K 4.3 2022 05:59 AM    K 3.9 2018 04:42 AM     2022 05:59 AM    CO2 21 2022 05:59 AM    BUN 22 2022 05:59 AM    LABALBU 2.8 2022 05:59 AM    CREATININE 1.9 2022 05:59 AM    CALCIUM 8.2 2022 05:59 AM    GFRAA 41 2022 05:59 AM    LABGLOM 34 09/02/2022 05:59 AM      PT/INR:    Lab Results   Component Value Date/Time    PROTIME 12.5 04/18/2022 09:51 AM    PROTIME 15.2 01/24/2011 06:48 PM    INR 0.97 04/18/2022 09:51 AM      PTT:    Lab Results   Component Value Date/Time    APTT 33.6 04/18/2022 09:51 AM     Blood Culture: 4/4 bottles +ESBL E.coli  Urine Culture: >50K CFU/ml mixed growth    Imaging:   CT ABDOMEN PELVIS WO CONTRAST Additional Contrast? None    Result Date: 9/1/2022  EXAMINATION: CT OF THE ABDOMEN AND PELVIS WITHOUT CONTRAST 9/1/2022 3:06 pm TECHNIQUE: CT of the abdomen and pelvis was performed without the administration of intravenous contrast. Multiplanar reformatted images are provided for review. Automated exposure control, iterative reconstruction, and/or weight based adjustment of the mA/kV was utilized to reduce the radiation dose to as low as reasonably achievable. COMPARISON: 04/18/2022. HISTORY: ORDERING SYSTEM PROVIDED HISTORY: abdominal pain TECHNOLOGIST PROVIDED HISTORY: Reason for exam:->abdominal pain Additional Contrast?->None Reason for Exam: abdominal pain, POSSIBLE UTI FINDINGS: Lower Chest: The visualized lung bases demonstrate subsegmental atelectasis/scarring. Trace pericardial fluid. Questionable trace bilateral pleural fluid. Atherosclerosis in the visualized thoracic aorta. Coronary artery calcifications. Small hiatal hernia. Liver: Normal. Gallbladder and Bile Ducts: Normal. Spleen: Normal. Adrenal Glands: Normal. Pancreas: Normal. Genitourinary: Left ureteral stent with minimal dilatation of the left urinary tract. Mild dilatation of the right urinary tract. Mild dilatation of the right urinary tract with increased right perinephric and periureteral stranding. No definite urinary stones. Suprapubic catheter in the decompressed urinary bladder. Bowel: Normal caliber bowel. Normal appendix. No significant diverticular disease. Vasculature: Atherosclerosis.   Normal caliber abdominal aorta. Bones and Soft Tissues: Fat containing right inguinal hernia. Degenerative changes in the visualized spine and pelvis. Retroperitoneum/Mesentery: No intraperitoneal free air, ascites or fluid collection. No lymphadenopathy in the abdomen or pelvis. Stranding/fluid throughout the retroperitoneum. 1.  Left ureteral stent in place. Mild bilateral urinary tract dilatation with new right perinephric and periureteral stranding raises concern for urinary tract infection. No urinary stones. 2.  Suprapubic catheter in the decompressed urinary bladder. 3.  Ill-defined stranding/fluid throughout the retroperitoneum may relate to vascular congestion or reactive changes. No intraperitoneal free air or abscess. XR CHEST PORTABLE    Result Date: 8/28/2022  EXAMINATION: ONE XRAY VIEW OF THE CHEST 8/28/2022 3:37 pm COMPARISON: 04/18/2022 HISTORY: ORDERING SYSTEM PROVIDED HISTORY: emergency TECHNOLOGIST PROVIDED HISTORY: Reason for exam:->emergency Reason for Exam: fatigue, emergency, fever FINDINGS: Increased interstitial opacities seen throughout both lungs. A focal infiltrate is not identified. The cardial pericardial silhouette is unremarkable. No pneumothorax is seen. No free air. No acute bony abnormality. Increased interstitial opacity. While much or all of this may be chronic, please correlate with any clinical evidence of acute interstitial edema. Assessment & Plan:      Kirsty Blunt is a 78 y.o. male admitted 7/70/1351 for metabolic encephalopathy. 1) Chronic Urinary Retention: managed with suprapubic catheter and exchanged monthly, last exchanged upon admission 8/28/22. Recommend SPT exchanges q3 weeks. 2) Chronic Left Hydronephrosis: s/p cystoscopy, left ureteral stent exchange 6/29/22. S/p cystoscopy, left ureteral stent placement 7/4/21              CT a/p 9/1/22: Left ureteral stent in place.  Mild bilateral urinary tract dilatation with new right perinephric and periureteral stranding raises concern for urinary tract infection. No urinary stones. Suprapubic catheter in the decompressed urinary bladder. Recommend left ureteral stent exchanges q3 months. We may perform cystoscopy, ureteral sent exchange while inpatient depending on hospital course. 3) Sepsis w Complicated UTI: urine cx 8/29/22 >50K CFU/ml mixed growth, likely contaminated. Blood cultures 4/4 +ESBL E.coli              WBC 6.0, afebrile              On IV Merrem  4) H/o Prostate Cancer: S/p RPP with Dr. Ludwig Alexander in [address] [place] [address] PSA 0.93 2/2021. On Eligard q6 months     Will follow. Patient seen and examined, chart reviewed.      Electronically signed by Kandi Zapien PA-C on 9/2/2022 at 8:53 AM

## 2022-09-03 ENCOUNTER — APPOINTMENT (OUTPATIENT)
Dept: ULTRASOUND IMAGING | Age: 82
DRG: 853 | End: 2022-09-03
Payer: MEDICARE

## 2022-09-03 LAB
ALBUMIN SERPL-MCNC: 2.8 GM/DL (ref 3.4–5)
ALBUMIN SERPL-MCNC: 3.2 GM/DL (ref 3.4–5)
ALP BLD-CCNC: 97 IU/L (ref 40–128)
ALP BLD-CCNC: 98 IU/L (ref 40–129)
ALT SERPL-CCNC: 15 U/L (ref 10–40)
ALT SERPL-CCNC: 15 U/L (ref 10–40)
ANION GAP SERPL CALCULATED.3IONS-SCNC: 10 MMOL/L (ref 4–16)
ANION GAP SERPL CALCULATED.3IONS-SCNC: 12 MMOL/L (ref 4–16)
AST SERPL-CCNC: 18 IU/L (ref 15–37)
AST SERPL-CCNC: 19 IU/L (ref 15–37)
BILIRUB SERPL-MCNC: 0.3 MG/DL (ref 0–1)
BILIRUB SERPL-MCNC: 0.4 MG/DL (ref 0–1)
BUN BLDV-MCNC: 20 MG/DL (ref 6–23)
BUN BLDV-MCNC: 21 MG/DL (ref 6–23)
CALCIUM SERPL-MCNC: 8.4 MG/DL (ref 8.3–10.6)
CALCIUM SERPL-MCNC: 8.5 MG/DL (ref 8.3–10.6)
CHLORIDE BLD-SCNC: 106 MMOL/L (ref 99–110)
CHLORIDE BLD-SCNC: 109 MMOL/L (ref 99–110)
CO2: 22 MMOL/L (ref 21–32)
CO2: 23 MMOL/L (ref 21–32)
CREAT SERPL-MCNC: 1.6 MG/DL (ref 0.9–1.3)
CREAT SERPL-MCNC: 1.8 MG/DL (ref 0.9–1.3)
CULTURE: ABNORMAL
CULTURE: ABNORMAL
GFR AFRICAN AMERICAN: 44 ML/MIN/1.73M2
GFR AFRICAN AMERICAN: 50 ML/MIN/1.73M2
GFR NON-AFRICAN AMERICAN: 36 ML/MIN/1.73M2
GFR NON-AFRICAN AMERICAN: 41 ML/MIN/1.73M2
GLUCOSE BLD-MCNC: 129 MG/DL (ref 70–99)
GLUCOSE BLD-MCNC: 130 MG/DL (ref 70–99)
GLUCOSE BLD-MCNC: 142 MG/DL (ref 70–99)
GLUCOSE BLD-MCNC: 145 MG/DL (ref 70–99)
GLUCOSE BLD-MCNC: 151 MG/DL (ref 70–99)
GLUCOSE BLD-MCNC: 153 MG/DL (ref 70–99)
GLUCOSE BLD-MCNC: 185 MG/DL (ref 70–99)
HCT VFR BLD CALC: 27 % (ref 42–52)
HEMOGLOBIN: 8.6 GM/DL (ref 13.5–18)
Lab: ABNORMAL
MCH RBC QN AUTO: 30 PG (ref 27–31)
MCHC RBC AUTO-ENTMCNC: 31.9 % (ref 32–36)
MCV RBC AUTO: 94.1 FL (ref 78–100)
PDW BLD-RTO: 14.9 % (ref 11.7–14.9)
PLATELET # BLD: 237 K/CU MM (ref 140–440)
PMV BLD AUTO: 10.8 FL (ref 7.5–11.1)
POTASSIUM SERPL-SCNC: 4.3 MMOL/L (ref 3.5–5.1)
POTASSIUM SERPL-SCNC: 4.3 MMOL/L (ref 3.5–5.1)
RBC # BLD: 2.87 M/CU MM (ref 4.6–6.2)
SODIUM BLD-SCNC: 141 MMOL/L (ref 135–145)
SODIUM BLD-SCNC: 141 MMOL/L (ref 135–145)
SPECIMEN: ABNORMAL
TOTAL PROTEIN: 5.2 GM/DL (ref 6.4–8.2)
TOTAL PROTEIN: 5.7 GM/DL (ref 6.4–8.2)
WBC # BLD: 7.1 K/CU MM (ref 4–10.5)

## 2022-09-03 PROCEDURE — 6370000000 HC RX 637 (ALT 250 FOR IP): Performed by: STUDENT IN AN ORGANIZED HEALTH CARE EDUCATION/TRAINING PROGRAM

## 2022-09-03 PROCEDURE — 93005 ELECTROCARDIOGRAM TRACING: CPT | Performed by: HOSPITALIST

## 2022-09-03 PROCEDURE — 94761 N-INVAS EAR/PLS OXIMETRY MLT: CPT

## 2022-09-03 PROCEDURE — 85027 COMPLETE CBC AUTOMATED: CPT

## 2022-09-03 PROCEDURE — 6370000000 HC RX 637 (ALT 250 FOR IP): Performed by: HOSPITALIST

## 2022-09-03 PROCEDURE — 80053 COMPREHEN METABOLIC PANEL: CPT

## 2022-09-03 PROCEDURE — 36415 COLL VENOUS BLD VENIPUNCTURE: CPT

## 2022-09-03 PROCEDURE — 76705 ECHO EXAM OF ABDOMEN: CPT

## 2022-09-03 PROCEDURE — 6360000002 HC RX W HCPCS: Performed by: STUDENT IN AN ORGANIZED HEALTH CARE EDUCATION/TRAINING PROGRAM

## 2022-09-03 PROCEDURE — 6360000002 HC RX W HCPCS: Performed by: HOSPITALIST

## 2022-09-03 PROCEDURE — 93005 ELECTROCARDIOGRAM TRACING: CPT | Performed by: STUDENT IN AN ORGANIZED HEALTH CARE EDUCATION/TRAINING PROGRAM

## 2022-09-03 PROCEDURE — 2580000003 HC RX 258: Performed by: HOSPITALIST

## 2022-09-03 PROCEDURE — 2580000003 HC RX 258: Performed by: STUDENT IN AN ORGANIZED HEALTH CARE EDUCATION/TRAINING PROGRAM

## 2022-09-03 PROCEDURE — 1200000000 HC SEMI PRIVATE

## 2022-09-03 PROCEDURE — 82962 GLUCOSE BLOOD TEST: CPT

## 2022-09-03 RX ORDER — CARVEDILOL 6.25 MG/1
3.12 TABLET ORAL 2 TIMES DAILY WITH MEALS
Status: DISCONTINUED | OUTPATIENT
Start: 2022-09-03 | End: 2022-09-06

## 2022-09-03 RX ADMIN — PANTOPRAZOLE SODIUM 40 MG: 40 TABLET, DELAYED RELEASE ORAL at 06:14

## 2022-09-03 RX ADMIN — DOCUSATE SODIUM 100 MG: 100 CAPSULE, LIQUID FILLED ORAL at 20:28

## 2022-09-03 RX ADMIN — CARVEDILOL 3.12 MG: 6.25 TABLET, FILM COATED ORAL at 17:31

## 2022-09-03 RX ADMIN — POLYETHYLENE GLYCOL (3350) 17 G: 17 POWDER, FOR SOLUTION ORAL at 20:28

## 2022-09-03 RX ADMIN — SODIUM CHLORIDE, PRESERVATIVE FREE 10 ML: 5 INJECTION INTRAVENOUS at 20:29

## 2022-09-03 RX ADMIN — Medication 10 MG: at 20:28

## 2022-09-03 RX ADMIN — CARVEDILOL 3.12 MG: 6.25 TABLET, FILM COATED ORAL at 08:49

## 2022-09-03 RX ADMIN — ASPIRIN 81 MG CHEWABLE TABLET 81 MG: 81 TABLET CHEWABLE at 08:48

## 2022-09-03 RX ADMIN — LEVOTHYROXINE SODIUM 75 MCG: 75 TABLET ORAL at 06:14

## 2022-09-03 RX ADMIN — ENOXAPARIN SODIUM 40 MG: 100 INJECTION SUBCUTANEOUS at 17:31

## 2022-09-03 RX ADMIN — ATORVASTATIN CALCIUM 80 MG: 40 TABLET, FILM COATED ORAL at 20:28

## 2022-09-03 RX ADMIN — AMLODIPINE BESYLATE 10 MG: 10 TABLET ORAL at 08:49

## 2022-09-03 RX ADMIN — MEROPENEM 1000 MG: 1 INJECTION, POWDER, FOR SOLUTION INTRAVENOUS at 11:47

## 2022-09-03 RX ADMIN — PREGABALIN 50 MG: 50 CAPSULE ORAL at 20:28

## 2022-09-03 RX ADMIN — PREGABALIN 50 MG: 50 CAPSULE ORAL at 08:49

## 2022-09-03 RX ADMIN — DOCUSATE SODIUM 100 MG: 100 CAPSULE, LIQUID FILLED ORAL at 08:49

## 2022-09-03 ASSESSMENT — PAIN SCALES - GENERAL: PAINLEVEL_OUTOF10: 0

## 2022-09-03 NOTE — CONSULTS
Consult completed. Nexiva 20g 1.75 inch catheter inserted via ultrasound in patient's LFA. Brisk blood return noted and catheter flushes with ease. Patient tolerated well. Consult IV/PICC team for any questions or if patient's needs change.

## 2022-09-03 NOTE — PROGRESS NOTES
Progress Note( Dr. Jeol Chavez)  9/3/2022  Subjective:   Admit Date: 8/28/2022  PCP: Orland Cogan, MD    Admitted For :Fatigue and possible sepsis from UTI    Consulted For: Control of blood glucose as patient has having hypoglycemic episodes    Interval History: feeling Somewhat better had  no more episode of low blood glucose yesterday    Denies any chest pains,   Denies SOB . Denies nausea or vomiting. No new bowel or bladder symptoms. Intake/Output Summary (Last 24 hours) at 9/3/2022 0802  Last data filed at 9/3/2022 0330  Gross per 24 hour   Intake --   Output 2500 ml   Net -2500 ml         DATA    CBC:   Recent Labs     08/31/22 1122 09/01/22  0349 09/02/22  0559   WBC 6.3 5.5 6.0   HGB 7.6* 7.4* 8.2*    155 184      CMP:  Recent Labs     09/01/22 0349 09/02/22  0559 09/03/22  0544    141 141   K 4.3 4.3 4.3   * 111* 109   CO2 19* 21 22   BUN 25* 22 21   CREATININE 1.8* 1.9* 1.8*   CALCIUM 8.0* 8.2* 8.4   PROT 4.9* 5.2* 5.2*   LABALBU 2.8* 2.8* 2.8*   BILITOT 0.2 0.2 0.3   ALKPHOS 91 96 97   AST 25 21 18   ALT 16 17 15       Lipids: No results found for: CHOL, HDL, TRIG  Glucose:  Recent Labs     09/02/22 2023 09/03/22  0324 09/03/22  0723   POCGLU 135* 185* 142*       MwqmarzhcfS5V:  Lab Results   Component Value Date/Time    LABA1C 7.8 08/29/2022 01:30 AM     High Sensitivity TSH:   Lab Results   Component Value Date/Time    TSHHS 7.590 04/28/2022 06:59 AM     Free T3: No results found for: FT3  Free T4:  Lab Results   Component Value Date/Time    T4FREE 1.15 03/27/2022 08:22 AM       CT ABDOMEN PELVIS WO CONTRAST Additional Contrast? None   Final Result   1. Left ureteral stent in place. Mild bilateral urinary tract dilatation   with new right perinephric and periureteral stranding raises concern for   urinary tract infection. No urinary stones. 2.  Suprapubic catheter in the decompressed urinary bladder.       3.  Ill-defined stranding/fluid throughout the retroperitoneum may relate to   vascular congestion or reactive changes. No intraperitoneal free air or   abscess. XR CHEST PORTABLE   Final Result   Increased interstitial opacity. While much or all of this may be chronic,   please correlate with any clinical evidence of acute interstitial edema. US ABDOMEN LIMITED Specify organ? LIVER, GALLBLADDER    (Results Pending)        Scheduled Medicines   Medications:    carvedilol  3.125 mg Oral BID WC    insulin lispro  0-4 Units SubCUTAneous 2 times per day    insulin glargine  10 Units SubCUTAneous Nightly    meropenem  1,000 mg IntraVENous Q12H    amLODIPine  10 mg Oral Daily    aspirin  81 mg Oral Daily    atorvastatin  80 mg Oral Nightly    levothyroxine  75 mcg Oral Daily    docusate sodium  100 mg Oral BID    bisacodyl  10 mg Rectal Daily    melatonin  10 mg Oral Nightly    pantoprazole  40 mg Oral QAM AC    polyethylene glycol  17 g Oral BID    pregabalin  50 mg Oral BID    sodium chloride flush  5-40 mL IntraVENous 2 times per day    enoxaparin  40 mg SubCUTAneous QPM    insulin lispro  0-4 Units SubCUTAneous TID WC      Infusions:    sodium chloride      dextrose      sodium chloride 100 mL/hr at 09/02/22 2300         Objective:   Vitals: BP (!) 161/78   Pulse 85   Temp 98.4 °F (36.9 °C) (Oral)   Resp 16   Ht 5' 8\" (1.727 m)   Wt 215 lb 12.8 oz (97.9 kg)   SpO2 98%   BMI 32.81 kg/m²   General appearance: alert and cooperative with exam  Neck: no JVD or bruit  Thyroid : Normal lobes   Lungs: Has Vesicular Breath sounds   Heart:  regular rate and rhythm  Abdomen: soft, non-tender; bowel sounds normal; no masses,  no organomegaly  Musculoskeletal: Normal  Extremities: extremities normal, , no edema  Neurologic:  Awake, alert, oriented to name, place and time. Cranial nerves II-XII are grossly intact. Motor weakness. Sensory neuropathy. ,  and gait is abnormal.  Stable    Assessment:     Patient Active Problem List:     Gait disturbance Generalized weakness     Diabetes mellitus (HCC)     Osteoarthritis     Anemia of chronic disorder     Infected implanted bladder sphincter cuff     Escherichia coli urinary tract infection     Hypertension     Sepsis ([address]     Acute encephalopathy     Type 2 diabetes mellitus with hypoglycemia without coma (HCC)     UTI (urinary tract infection) due to urinary indwelling catheter (HCC)     Hyperkalemia     Acute kidney failure (HCC)     Leg weakness, bilateral     Type 2 diabetes mellitus with neurological manifestations, uncontrolled ([address]     Complicated UTI (urinary tract infection)     Essential hypertension     Severe muscle deconditioning     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 (HCC)     Frequent falls     Chronic kidney disease, stage 3a ([address]     Uncontrolled type 2 diabetes mellitus with peripheral neuropathy (HCC)     Prostate cancer (HCC)     Suprapubic catheter ([address]     Sepsis due to urinary tract infection ([address]     Coagulase negative Staphylococcus bacteremia     Chronic kidney disease, stage IV (severe) (HCC)     Acute metabolic encephalopathy     SVT (supraventricular tachycardia) (HCC)     Metabolic encephalopathy     History of prostate cancer     Cigarette nicotine dependence without complication     Altered mental status     Serratia marcescens infection     Hypoglycemia      Plan:     Reviewed POC blood glucose . Labs and X ray results   Reviewed Current Medicines   On Correction bolus Humalog/ Basal Lantus Insulin regime /   Monitor Blood glucose frequently   Modified  the dose of Insulin/ other medicines as neede  Management is working to show the patient to skilled nursing unit possibly [place]  Will follow     .      Jameel Metcalf MD, MD

## 2022-09-03 NOTE — PROGRESS NOTES
Hospitalist Progress Note      Name:  Luis Alberto Mims /Age/Sex: 1940  (78 y.o. male)   MRN & CSN:  [id number] & [id number] Admission Date/Time: 2022  6:18 PM   Location:  [address] [place] [address] PCP: Kevin Ramsay MD         Hospital Day: 7    Assessment and Plan:     78-year-old male with history of prostate cancer, diabetes mellitus, essential hypertension who was admitted with fatigue overnight. History of suprapubic catheter, UA positive for cystitis, was started on cefepime, blood culture positive for E. Coli    ESBL bacteremia: Urine culture on  growing E. coli resistant to Cipro and cefepime  With leukocytosis and fever  Continue IV fluid, decrease the dose to 50 cc/h  Switched IV antibiotic to IV meropenem  Consult ID  Urine culture on  showing no significant growth  Order CT abdomen pelvis to look for the source of the infection could be gallbladder as the patient has abdominal pain  showing    Left ureteral stent in place. Mild bilateral urinary tract dilatation   with new right perinephric and periureteral stranding raises concern for   urinary tract infection. No urinary stones. 2.  Suprapubic catheter in the decompressed urinary bladder. 3.  Ill-defined stranding/fluid throughout the retroperitoneum may relate to   vascular congestion or reactive changes. No intraperitoneal free air or   abscess.    Order US abdomen unremarkable  order another 2 sets blood culture today , so far showing no growth  Reported hematuria on admission hemoglobin 8.1    Hemoglobin stable 8.2  Consult urologist for hematuria  Seems hematuria resolved  Monitor CBC daily and transfuse for hemoglobin less than 7  PT OT evaluation      History of prostate cancer status post prostatectomy     Hypertension: Blood pressure stable continue on current medication    Hypothyroidism continue levothyroxine    CKD with serum creatinine 1.8 close to his baseline  Continue IV fluid and monitor renal function  Avoid nephrotoxic  Patient with history of prostate cancer        Type 2 diabetes: Patient has episode of low blood sugar   Restarted on small dose of Lantus 10 units nightly by endocrinologist  on hypoglycemia protocol  Continue on sliding scale correction for now  Check blood sugar ACH S    Diet Diet NPO   DVT Prophylaxis [x] Lovenox, []  Heparin, [] SCDs, [] Ambulation   GI Prophylaxis [] PPI,  [] H2 Blocker,  [] Carafate,  [] Diet/Tube Feeds   Code Status Full Code   Disposition Patient requires continued admission due to    MDM [] Low, [] Moderate,[]  High  Patient's risk as above due to      History of Present Illness: The patient was seen and examined at the bedside  Patient feels better  Denies abdominal pain or fullness  Resume on home dose Coreg with stable blood pressure  Objective: Intake/Output Summary (Last 24 hours) at 9/3/2022 1031  Last data filed at 9/3/2022 0330  Gross per 24 hour   Intake --   Output 2500 ml   Net -2500 ml        Vitals:   Vitals:    09/03/22 0753   BP: (!) 161/78   Pulse: 85   Resp: 16   Temp: 98.4 °F (36.9 °C)   SpO2: 98%     Physical Exam:   GEN Awake.  Alert , not in respiratory distress, not in pain  HEENT: PEERLA, , supple neck,   Chest: air entry equal bilaterally, no wheezing or crepitation  Heart: S1 and S2 heard, no murmur, no gallop or rub, regular rate  Abdomen: soft, ND , tender without rigidity +BS  Extremities: no cyanosis, tenderness or erythema, peripheral pulses audible  Neurology: alert, confused, able to move 4 limbs    Medications:   Medications:    carvedilol  3.125 mg Oral BID     insulin lispro  0-4 Units SubCUTAneous 2 times per day    insulin glargine  10 Units SubCUTAneous Nightly    meropenem  1,000 mg IntraVENous Q12H    amLODIPine  10 mg Oral Daily    aspirin  81 mg Oral Daily    atorvastatin  80 mg Oral Nightly    levothyroxine  75 mcg Oral Daily    docusate sodium  100 mg Oral BID    bisacodyl  10 mg Rectal Daily melatonin  10 mg Oral Nightly    pantoprazole  40 mg Oral QAM AC    polyethylene glycol  17 g Oral BID    pregabalin  50 mg Oral BID    sodium chloride flush  5-40 mL IntraVENous 2 times per day    enoxaparin  40 mg SubCUTAneous QPM    insulin lispro  0-4 Units SubCUTAneous TID WC      Infusions:    sodium chloride      dextrose      sodium chloride 100 mL/hr at 09/02/22 2300     PRN Meds: hydrALAZINE (APRESOLINE) ivpb, 10 mg, Q4H PRN  magnesium hydroxide, 30 mL, Daily PRN  sodium chloride flush, 5-40 mL, PRN  sodium chloride, , PRN  ondansetron, 4 mg, Q8H PRN   Or  ondansetron, 4 mg, Q6H PRN  aluminum & magnesium hydroxide-simethicone, 30 mL, Q6H PRN  acetaminophen, 650 mg, Q6H PRN   Or  acetaminophen, 650 mg, Q6H PRN  glucose, 4 tablet, PRN  dextrose bolus, 125 mL, PRN   Or  dextrose bolus, 250 mL, PRN  glucagon (rDNA), 1 mg, PRN  dextrose, , Continuous PRN  ipratropium-albuterol, 1 vial, BID PRN        Electronically signed by Evie Esparza MD on 9/3/2022 at 10:31 AM

## 2022-09-03 NOTE — DISCHARGE INSTR - COC
Continuity of Care Form    Patient Name: Natalia Virk   :  1940  MRN:  [id number]    [address] [place] [address] date:  2022  Discharge date:  22    Code Status Order: Full Code   Advance Directives:     Admitting Physician:  Araceli Landis MD  PCP: Carlos Arndt MD    Discharging Nurse: Ej  Discharging Hospital Unit/Room#: 4122/4122-A  Discharging Unit Phone Number: [phone]    Emergency Contact:   Extended Emergency Contact Information  Primary Emergency Contact: [address] Phone: [phone]  Relation: Child  Secondary Emergency Contact: Yariel Sosa [address] Phone: [phone]  Mobile Phone: [phone]  Relation: Child  Preferred language: English   needed? No    Past Surgical History:  Past Surgical History:   Procedure Laterality Date    BLADDER SURGERY      BLADDER SURGERY Left 3/17/2022    CYSTOSCOPY LEFT STENT EXCHANGE performed by Travis Bull MD at [facility]  3/28/13    Cysto with removal of artificial sphinter and placement of suprapubic catheter.     PROSTATE SURGERY      PROSTATECTOMY      infection       Immunization History:   Immunization History   Administered Date(s) Administered    COVID-19, MODERNA BLUE border, Primary or Immunocompromised, (age 12y+), IM, 100 mcg/0.5mL 2021, 2021, 2022       Active Problems:  Patient Active Problem List   Diagnosis Code    Gait disturbance R26.9    Generalized weakness R53.1    Diabetes mellitus ([address] E11.9    Osteoarthritis M19.90    Anemia of chronic disorder D63.8    Infected implanted bladder sphincter cuff T83.69XA    Escherichia coli urinary tract infection N39.0, B96.20    Hypertension I10    Sepsis ([address] A41.9    Acute encephalopathy G93.40    Type 2 diabetes mellitus with hypoglycemia without coma (HCC) E11.649    UTI (urinary tract infection) due to urinary indwelling catheter (HCC) T83.511A, N39.0    Hyperkalemia E87.5    Acute kidney failure [facility]) N17.9    Leg weakness, bilateral R29.898    Type 2 diabetes mellitus with neurological manifestations, uncontrolled ([address] A71.87, W52.27    Complicated UTI (urinary tract infection) N39.0    Essential hypertension I10    Severe muscle deconditioning R29.898    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 (HCC) E11.69, E78.5    Frequent falls R29.6    Chronic kidney disease, stage 3a ([address] N18.31    Uncontrolled type 2 diabetes mellitus with peripheral neuropathy (HCC) E11.42, E11.65    Prostate cancer ([address] C61    Suprapubic catheter ([facility] Z93.59    Sepsis due to urinary tract infection ([address] A41.9, N39.0    Coagulase negative Staphylococcus bacteremia R78.81, B95.7    Chronic kidney disease, stage IV (severe) ([address] Y86.0    Acute metabolic encephalopathy I19.04    SVT (supraventricular tachycardia) ([facility]) N23.8    Metabolic encephalopathy C86.12    History of prostate cancer Z85.46    Cigarette nicotine dependence without complication Y80.601    Altered mental status R41.82    Serratia marcescens infection A48.8    Hypoglycemia E16.2       Isolation/Infection:   Isolation            Contact          Patient Infection Status       Infection Onset Added Last Indicated Last Indicated By Review Planned Expiration Resolved Resolved By    ESBL (Extended Spectrum Beta Lactamase) 11/16/21 11/18/21 08/30/22 Culture, Blood 1        MDRO (multi-drug resistant organism) 11/16/21 11/18/21 11/16/21 Culture, Urine        STAPHYLOCOCCUS CAPITIS  blood 2 3/22/22    SERRATIA MARCESCENS  - Urine 4/19/22    Resolved    COVID-19 (Rule Out) 08/28/22 08/28/22 08/28/22 Respiratory Panel, Molecular, with COVID-19 (Restricted: peds pts or suitable admitted adults) (Ordered)   08/28/22 Rule-Out Test Resulted    COVID-19 (Rule Out) 04/18/22 04/18/22 04/18/22 COVID-19, Rapid (Ordered)   04/18/22 Rule-Out Test Resulted    COVID-19 (Rule Out) 03/16/22 03/16/22 03/17/22 COVID-19, Rapid (Ordered)   03/17/22 Rule-Out Test Resulted    COVID-19 22 COVID-19, Rapid   22     COVID-19 (Rule Out) 22 COVID-19, Rapid (Ordered)   22 Rule-Out Test Resulted    COVID-19 (Rule Out) 21 COVID-19, Rapid (Ordered)   21 Rule-Out Test Resulted    MRSA  16 Patricia Saldana RN   18 Patricia Saldana RN    16 urine            Nurse Assessment:  Last Vital Signs: BP (!) 161/78   Pulse 85   Temp 98.4 °F (36.9 °C) (Oral)   Resp 16   Ht 5' 8\" (1.727 m)   Wt 215 lb 12.8 oz (97.9 kg)   SpO2 98%   BMI 32.81 kg/m²     Last documented pain score (0-10 scale): Pain Level: 0  Last Weight:   Wt Readings from Last 1 Encounters:   22 215 lb 12.8 oz (97.9 kg)     Mental Status:  oriented    IV Access:  {[facility] ACCESS:754603388}    Nursing Mobility/ADLs:  Walking   Dependent  Transfer  Dependent  Bathing  Dependent  Dressing  Dependent  Toileting  Dependent  Feeding  Assisted  Med Admin  Dependent  Med Delivery   whole    Wound Care Documentation and Therapy:        Elimination:  Continence:    Bowel: No  Bladder: No  Urinary Catheter: {Urinary Catheter:335660906}   Colostomy/Ileostomy/Ileal Conduit: {YES / JT:70020}       Date of Last BM:       Intake/Output Summary (Last 24 hours) at 9/3/2022 1448  Last data filed at 9/3/2022 0330  Gross per 24 hour   Intake --   Output 1600 ml   Net -1600 ml     I/O last 3 completed shifts:  In: -   Out: 4300 [Urine:4300]    Safety Concerns:     [facility] Safety Concerns:377881544}    Impairments/Disabilities:      [facility] Impairments/Disabilities:326787573}      Patient's personal belongings (please select all that are sent with patient):  {[place] [facility] Belongings:366806021}    RN SIGNATURE:  Electronically signed by Elmer Crook LPN on  at 1:79 PM EDT    CASE MANAGEMENT/SOCIAL WORK SECTION    Inpatient Status Date: ***    Readmission Risk Assessment Score:  Readmission Risk              Risk of Unplanned Readmission:  41 Discharging to Facility/ Agency   Name:   Address:  Phone:  Fax:    Dialysis Facility (if applicable)   Name:  Address:  Dialysis Schedule:  Phone:  Fax:    / signature: {Esignature:670210247}    PHYSICIAN SECTION    Nutrition Therapy:  Current Nutrition Therapy:   [address] MYRIAM Diet JIAU:559160485}    Routes of Feeding: {CHP DME Other Feedings:054417538}  Liquids: {Slp liquid thickness:69068}  Daily Fluid Restriction: {CHP DME Yes amt example:142992811}  Last Modified Barium Swallow with Video (Video Swallowing Test): {Done Not Done JOQT:451836263}    Treatments at the Time of Hospital Discharge:   Respiratory Treatments: ***  Oxygen Therapy:  {Therapy; copd oxygen:34383}  Ventilator:    { CC Vent BAGW:467216728}    Rehab Therapies: {THERAPEUTIC INTERVENTION:2318373549}  Weight Bearing Status/Restrictions: 50Dianelys NAYAK Weight Bearin}  Other Medical Equipment (for information only, NOT a DME order):  {EQUIPMENT:820402436}  Other Treatments: ***    Prognosis: {Prognosis:6936382776}    Condition at Discharge: [address] Patient Condition:619032020}    Rehab Potential (if transferring to Rehab): {Prognosis:0683788530}    Recommended Labs or Other Treatments After Discharge: ***    Physician Certification: I certify the above information and transfer of Lakesha Casper  is necessary for the continuing treatment of the diagnosis listed and that he requires {Admit to Appropriate Level of Care:87835} for {GREATER/LESS:071458536} 30 days.      Update Admission H&P: {CHP DME Changes in [facility]:628135043}    PHYSICIAN SIGNATURE:  {Esignature:123179018}

## 2022-09-03 NOTE — PROGRESS NOTES
Patient's heart rate elevated: 142 regular. EKG completed per . It read: sinus rhythm with 1st degree AV block. Dr. Loli Kinsey. Heart rate after EK. Continue to monitor for change of condition.

## 2022-09-04 LAB
CULTURE: NORMAL
EKG ATRIAL RATE: 83 BPM
EKG ATRIAL RATE: 88 BPM
EKG DIAGNOSIS: NORMAL
EKG DIAGNOSIS: NORMAL
EKG P AXIS: 42 DEGREES
EKG P AXIS: 51 DEGREES
EKG P-R INTERVAL: 210 MS
EKG P-R INTERVAL: 214 MS
EKG Q-T INTERVAL: 354 MS
EKG Q-T INTERVAL: 370 MS
EKG QRS DURATION: 70 MS
EKG QRS DURATION: 76 MS
EKG QTC CALCULATION (BAZETT): 428 MS
EKG QTC CALCULATION (BAZETT): 434 MS
EKG R AXIS: -4 DEGREES
EKG R AXIS: -4 DEGREES
EKG T AXIS: 16 DEGREES
EKG T AXIS: 19 DEGREES
EKG VENTRICULAR RATE: 83 BPM
EKG VENTRICULAR RATE: 88 BPM
GLUCOSE BLD-MCNC: 180 MG/DL (ref 70–99)
GLUCOSE BLD-MCNC: 199 MG/DL (ref 70–99)
GLUCOSE BLD-MCNC: 230 MG/DL (ref 70–99)
GLUCOSE BLD-MCNC: 262 MG/DL (ref 70–99)
HCT VFR BLD CALC: 26.4 % (ref 42–52)
HEMOGLOBIN: 8.4 GM/DL (ref 13.5–18)
Lab: NORMAL
MCH RBC QN AUTO: 29.8 PG (ref 27–31)
MCHC RBC AUTO-ENTMCNC: 31.8 % (ref 32–36)
MCV RBC AUTO: 93.6 FL (ref 78–100)
PDW BLD-RTO: 14.6 % (ref 11.7–14.9)
PLATELET # BLD: 236 K/CU MM (ref 140–440)
PMV BLD AUTO: 10.4 FL (ref 7.5–11.1)
RBC # BLD: 2.82 M/CU MM (ref 4.6–6.2)
SPECIMEN: NORMAL
WBC # BLD: 6.5 K/CU MM (ref 4–10.5)

## 2022-09-04 PROCEDURE — 2580000003 HC RX 258: Performed by: HOSPITALIST

## 2022-09-04 PROCEDURE — 6370000000 HC RX 637 (ALT 250 FOR IP): Performed by: HOSPITALIST

## 2022-09-04 PROCEDURE — 93010 ELECTROCARDIOGRAM REPORT: CPT | Performed by: INTERNAL MEDICINE

## 2022-09-04 PROCEDURE — 6370000000 HC RX 637 (ALT 250 FOR IP): Performed by: STUDENT IN AN ORGANIZED HEALTH CARE EDUCATION/TRAINING PROGRAM

## 2022-09-04 PROCEDURE — 94761 N-INVAS EAR/PLS OXIMETRY MLT: CPT

## 2022-09-04 PROCEDURE — 2580000003 HC RX 258: Performed by: STUDENT IN AN ORGANIZED HEALTH CARE EDUCATION/TRAINING PROGRAM

## 2022-09-04 PROCEDURE — 85027 COMPLETE CBC AUTOMATED: CPT

## 2022-09-04 PROCEDURE — 6360000002 HC RX W HCPCS: Performed by: HOSPITALIST

## 2022-09-04 PROCEDURE — 6370000000 HC RX 637 (ALT 250 FOR IP): Performed by: INTERNAL MEDICINE

## 2022-09-04 PROCEDURE — 82962 GLUCOSE BLOOD TEST: CPT

## 2022-09-04 PROCEDURE — 6360000002 HC RX W HCPCS: Performed by: STUDENT IN AN ORGANIZED HEALTH CARE EDUCATION/TRAINING PROGRAM

## 2022-09-04 PROCEDURE — 36415 COLL VENOUS BLD VENIPUNCTURE: CPT

## 2022-09-04 PROCEDURE — 1200000000 HC SEMI PRIVATE

## 2022-09-04 RX ADMIN — MEROPENEM 1000 MG: 1 INJECTION, POWDER, FOR SOLUTION INTRAVENOUS at 12:48

## 2022-09-04 RX ADMIN — PANTOPRAZOLE SODIUM 40 MG: 40 TABLET, DELAYED RELEASE ORAL at 09:32

## 2022-09-04 RX ADMIN — AMLODIPINE BESYLATE 10 MG: 10 TABLET ORAL at 09:32

## 2022-09-04 RX ADMIN — PREGABALIN 50 MG: 50 CAPSULE ORAL at 19:58

## 2022-09-04 RX ADMIN — POLYETHYLENE GLYCOL (3350) 17 G: 17 POWDER, FOR SOLUTION ORAL at 09:33

## 2022-09-04 RX ADMIN — ATORVASTATIN CALCIUM 80 MG: 40 TABLET, FILM COATED ORAL at 19:58

## 2022-09-04 RX ADMIN — INSULIN LISPRO 2 UNITS: 100 INJECTION, SOLUTION INTRAVENOUS; SUBCUTANEOUS at 12:46

## 2022-09-04 RX ADMIN — ASPIRIN 81 MG CHEWABLE TABLET 81 MG: 81 TABLET CHEWABLE at 09:33

## 2022-09-04 RX ADMIN — MEROPENEM 1000 MG: 1 INJECTION, POWDER, FOR SOLUTION INTRAVENOUS at 01:13

## 2022-09-04 RX ADMIN — POLYETHYLENE GLYCOL (3350) 17 G: 17 POWDER, FOR SOLUTION ORAL at 19:58

## 2022-09-04 RX ADMIN — INSULIN GLARGINE 10 UNITS: 100 INJECTION, SOLUTION SUBCUTANEOUS at 21:29

## 2022-09-04 RX ADMIN — DOCUSATE SODIUM 100 MG: 100 CAPSULE, LIQUID FILLED ORAL at 19:58

## 2022-09-04 RX ADMIN — PREGABALIN 50 MG: 50 CAPSULE ORAL at 09:33

## 2022-09-04 RX ADMIN — LEVOTHYROXINE SODIUM 75 MCG: 75 TABLET ORAL at 09:33

## 2022-09-04 RX ADMIN — Medication 10 MG: at 19:58

## 2022-09-04 RX ADMIN — SODIUM CHLORIDE, PRESERVATIVE FREE 10 ML: 5 INJECTION INTRAVENOUS at 09:33

## 2022-09-04 RX ADMIN — CARVEDILOL 3.12 MG: 6.25 TABLET, FILM COATED ORAL at 09:33

## 2022-09-04 RX ADMIN — CARVEDILOL 3.12 MG: 6.25 TABLET, FILM COATED ORAL at 17:12

## 2022-09-04 RX ADMIN — ACETAMINOPHEN 650 MG: 325 TABLET ORAL at 15:58

## 2022-09-04 RX ADMIN — ENOXAPARIN SODIUM 40 MG: 100 INJECTION SUBCUTANEOUS at 17:12

## 2022-09-04 RX ADMIN — SODIUM CHLORIDE, PRESERVATIVE FREE 10 ML: 5 INJECTION INTRAVENOUS at 20:08

## 2022-09-04 RX ADMIN — DOCUSATE SODIUM 100 MG: 100 CAPSULE, LIQUID FILLED ORAL at 09:32

## 2022-09-04 ASSESSMENT — PAIN SCALES - GENERAL
PAINLEVEL_OUTOF10: 0
PAINLEVEL_OUTOF10: 1

## 2022-09-04 NOTE — PROGRESS NOTES
Hospitalist Progress Note      Name:  Jannet Mancini /Age/Sex: 1940  (78 y.o. male)   MRN & CSN:  [id number] & [id number] Admission Date/Time: 2022  6:18 PM   Location:  6217/0038[address]S PCP: Tere Bauer MD         Hospital Day: 8    Assessment and Plan:     68-year-old male with history of prostate cancer, diabetes mellitus, essential hypertension who was admitted with fatigue overnight. History of suprapubic catheter, UA positive for cystitis, was started on cefepime, blood culture positive for E. Coli    ESBL bacteremia: Urine culture on  growing E. coli resistant to Cipro and cefepime  With leukocytosis and fever  Continue IV fluid, decrease the dose to 50 cc/h  Switched IV antibiotic to IV meropenem  Consult ID  Urine culture on  showing no significant growth  Order CT abdomen pelvis to look for the source of the infection could be gallbladder as the patient has abdominal pain  showing    Left ureteral stent in place. Mild bilateral urinary tract dilatation   with new right perinephric and periureteral stranding raises concern for   urinary tract infection. No urinary stones. 2.  Suprapubic catheter in the decompressed urinary bladder. 3.  Ill-defined stranding/fluid throughout the retroperitoneum may relate to   vascular congestion or reactive changes. No intraperitoneal free air or   abscess.    Order US abdomen unremarkable  order another 2 sets blood culture today , so far showing no growth  Reported hematuria on admission hemoglobin 8.1    Hemoglobin stable 8.4  Consult urologist for hematuria  Seems hematuria resolved  Monitor CBC daily and transfuse for hemoglobin less than 7  PT OT evaluation      History of prostate cancer status post prostatectomy     Hypertension: Blood pressure stable continue on current medication  Resume on home cardiac    Hypothyroidism continue levothyroxine    CKD with serum creatinine 1.6 close to his baseline  Continue IV fluid and monitor renal function  Avoid nephrotoxic  Patient with history of prostate cancer        Type 2 diabetes: Patient has episode of low blood sugar   Restarted on small dose of Lantus 10 units nightly by endocrinologist  on hypoglycemia protocol  Continue on sliding scale correction for now  Check blood sugar ACH S    Diet ADULT DIET; Dysphagia - Soft and Bite Sized; 5 carb choices (75 gm/meal); Low Fat/Low Chol/High Fiber/JORGE  ADULT ORAL NUTRITION SUPPLEMENT; Lunch, Dinner; Low Calorie/High Protein Oral Supplement  ADULT ORAL NUTRITION SUPPLEMENT; Breakfast; Other Oral Supplement; Kefir   DVT Prophylaxis [x] Lovenox, []  Heparin, [] SCDs, [] Ambulation   GI Prophylaxis [] PPI,  [] H2 Blocker,  [] Carafate,  [] Diet/Tube Feeds   Code Status Full Code   Disposition Patient requires continued admission due to    MDM [] Low, [] Moderate,[]  High  Patient's risk as above due to      History of Present Illness: The patient was seen and examined at the bedside  Patient feels better  Denies abdominal pain or fullness  Resume on home dose Coreg with stable blood pressure  Spoke with brother POA who request SNF placement     Objective: Intake/Output Summary (Last 24 hours) at 9/4/2022 1009  Last data filed at 9/3/2022 2044  Gross per 24 hour   Intake --   Output 1900 ml   Net -1900 ml        Vitals:   Vitals:    09/04/22 0927   BP: (!) 147/67   Pulse: 80   Resp: 16   Temp: 98.8 °F (37.1 °C)   SpO2: 100%     Physical Exam:   GEN Awake.  Alert , not in respiratory distress, not in pain  HEENT: PEERLA, , supple neck,   Chest: air entry equal bilaterally, no wheezing or crepitation  Heart: S1 and S2 heard, no murmur, no gallop or rub, regular rate  Abdomen: soft, ND , tender without rigidity +BS  Extremities: no cyanosis, tenderness or erythema, peripheral pulses audible  Neurology: alert, confused, able to move 4 limbs    Medications:   Medications:    carvedilol  3.125 mg Oral BID WC insulin lispro  0-4 Units SubCUTAneous 2 times per day    insulin glargine  10 Units SubCUTAneous Nightly    meropenem  1,000 mg IntraVENous Q12H    amLODIPine  10 mg Oral Daily    aspirin  81 mg Oral Daily    atorvastatin  80 mg Oral Nightly    levothyroxine  75 mcg Oral Daily    docusate sodium  100 mg Oral BID    bisacodyl  10 mg Rectal Daily    melatonin  10 mg Oral Nightly    pantoprazole  40 mg Oral QAM AC    polyethylene glycol  17 g Oral BID    pregabalin  50 mg Oral BID    sodium chloride flush  5-40 mL IntraVENous 2 times per day    enoxaparin  40 mg SubCUTAneous QPM    insulin lispro  0-4 Units SubCUTAneous TID WC      Infusions:    sodium chloride      dextrose      sodium chloride 50 mL/hr at 09/03/22 1048     PRN Meds: hydrALAZINE (APRESOLINE) ivpb, 10 mg, Q4H PRN  magnesium hydroxide, 30 mL, Daily PRN  sodium chloride flush, 5-40 mL, PRN  sodium chloride, , PRN  ondansetron, 4 mg, Q8H PRN   Or  ondansetron, 4 mg, Q6H PRN  aluminum & magnesium hydroxide-simethicone, 30 mL, Q6H PRN  acetaminophen, 650 mg, Q6H PRN   Or  acetaminophen, 650 mg, Q6H PRN  glucose, 4 tablet, PRN  dextrose bolus, 125 mL, PRN   Or  dextrose bolus, 250 mL, PRN  glucagon (rDNA), 1 mg, PRN  dextrose, , Continuous PRN  ipratropium-albuterol, 1 vial, BID PRN        Electronically signed by Allegra See MD on 9/4/2022 at 10:09 AM

## 2022-09-04 NOTE — PLAN OF CARE
Problem: Discharge Planning  Goal: Discharge to home or other facility with appropriate resources  Outcome: Progressing     Problem: Skin/Tissue Integrity  Goal: Absence of new skin breakdown  Description: 1. Monitor for areas of redness and/or skin breakdown  2. Assess vascular access sites hourly  3. Every 4-6 hours minimum:  Change oxygen saturation probe site  4. Every 4-6 hours:  If on nasal continuous positive airway pressure, respiratory therapy assess nares and determine need for appliance change or resting period.   Outcome: Progressing     Problem: Chronic Conditions and Co-morbidities  Goal: Patient's chronic conditions and co-morbidity symptoms are monitored and maintained or improved  Outcome: Progressing     Problem: Pain  Goal: Verbalizes/displays adequate comfort level or baseline comfort level  Outcome: Progressing     Problem: Safety - Adult  Goal: Free from fall injury  Outcome: Progressing     Problem: Nutrition Deficit:  Goal: Optimize nutritional status  Outcome: Progressing

## 2022-09-05 LAB
ALBUMIN SERPL-MCNC: 3 GM/DL (ref 3.4–5)
ALP BLD-CCNC: 93 IU/L (ref 40–128)
ALT SERPL-CCNC: 14 U/L (ref 10–40)
ANION GAP SERPL CALCULATED.3IONS-SCNC: 10 MMOL/L (ref 4–16)
AST SERPL-CCNC: 17 IU/L (ref 15–37)
BILIRUB SERPL-MCNC: 0.2 MG/DL (ref 0–1)
BUN BLDV-MCNC: 22 MG/DL (ref 6–23)
CALCIUM SERPL-MCNC: 8.4 MG/DL (ref 8.3–10.6)
CHLORIDE BLD-SCNC: 106 MMOL/L (ref 99–110)
CO2: 24 MMOL/L (ref 21–32)
CREAT SERPL-MCNC: 1.9 MG/DL (ref 0.9–1.3)
GFR AFRICAN AMERICAN: 41 ML/MIN/1.73M2
GFR NON-AFRICAN AMERICAN: 34 ML/MIN/1.73M2
GLUCOSE BLD-MCNC: 185 MG/DL (ref 70–99)
GLUCOSE BLD-MCNC: 193 MG/DL (ref 70–99)
GLUCOSE BLD-MCNC: 195 MG/DL (ref 70–99)
GLUCOSE BLD-MCNC: 215 MG/DL (ref 70–99)
GLUCOSE BLD-MCNC: 301 MG/DL (ref 70–99)
HCT VFR BLD CALC: 25.5 % (ref 42–52)
HEMOGLOBIN: 8.1 GM/DL (ref 13.5–18)
MCH RBC QN AUTO: 29.9 PG (ref 27–31)
MCHC RBC AUTO-ENTMCNC: 31.8 % (ref 32–36)
MCV RBC AUTO: 94.1 FL (ref 78–100)
PDW BLD-RTO: 14.9 % (ref 11.7–14.9)
PLATELET # BLD: 263 K/CU MM (ref 140–440)
PMV BLD AUTO: 10.8 FL (ref 7.5–11.1)
POTASSIUM SERPL-SCNC: 4.3 MMOL/L (ref 3.5–5.1)
RBC # BLD: 2.71 M/CU MM (ref 4.6–6.2)
SODIUM BLD-SCNC: 140 MMOL/L (ref 135–145)
TOTAL PROTEIN: 5.4 GM/DL (ref 6.4–8.2)
WBC # BLD: 7.2 K/CU MM (ref 4–10.5)

## 2022-09-05 PROCEDURE — 6360000002 HC RX W HCPCS: Performed by: HOSPITALIST

## 2022-09-05 PROCEDURE — 1200000000 HC SEMI PRIVATE

## 2022-09-05 PROCEDURE — 80053 COMPREHEN METABOLIC PANEL: CPT

## 2022-09-05 PROCEDURE — 6370000000 HC RX 637 (ALT 250 FOR IP): Performed by: HOSPITALIST

## 2022-09-05 PROCEDURE — 6360000002 HC RX W HCPCS: Performed by: STUDENT IN AN ORGANIZED HEALTH CARE EDUCATION/TRAINING PROGRAM

## 2022-09-05 PROCEDURE — 2580000003 HC RX 258: Performed by: HOSPITALIST

## 2022-09-05 PROCEDURE — 6370000000 HC RX 637 (ALT 250 FOR IP): Performed by: STUDENT IN AN ORGANIZED HEALTH CARE EDUCATION/TRAINING PROGRAM

## 2022-09-05 PROCEDURE — 85027 COMPLETE CBC AUTOMATED: CPT

## 2022-09-05 PROCEDURE — 82962 GLUCOSE BLOOD TEST: CPT

## 2022-09-05 PROCEDURE — 2580000003 HC RX 258: Performed by: STUDENT IN AN ORGANIZED HEALTH CARE EDUCATION/TRAINING PROGRAM

## 2022-09-05 PROCEDURE — 94761 N-INVAS EAR/PLS OXIMETRY MLT: CPT

## 2022-09-05 PROCEDURE — 6370000000 HC RX 637 (ALT 250 FOR IP): Performed by: INTERNAL MEDICINE

## 2022-09-05 PROCEDURE — 36415 COLL VENOUS BLD VENIPUNCTURE: CPT

## 2022-09-05 RX ORDER — LIDOCAINE 4 G/G
1 PATCH TOPICAL DAILY
Status: DISCONTINUED | OUTPATIENT
Start: 2022-09-05 | End: 2022-09-21 | Stop reason: HOSPADM

## 2022-09-05 RX ORDER — INSULIN LISPRO 100 [IU]/ML
0-16 INJECTION, SOLUTION INTRAVENOUS; SUBCUTANEOUS
Status: DISCONTINUED | OUTPATIENT
Start: 2022-09-05 | End: 2022-09-10

## 2022-09-05 RX ORDER — INSULIN GLARGINE 100 [IU]/ML
15 INJECTION, SOLUTION SUBCUTANEOUS NIGHTLY
Status: DISCONTINUED | OUTPATIENT
Start: 2022-09-05 | End: 2022-09-05

## 2022-09-05 RX ORDER — INSULIN LISPRO 100 [IU]/ML
0-4 INJECTION, SOLUTION INTRAVENOUS; SUBCUTANEOUS
Status: DISCONTINUED | OUTPATIENT
Start: 2022-09-05 | End: 2022-09-05

## 2022-09-05 RX ORDER — HYDROCODONE BITARTRATE AND ACETAMINOPHEN 5; 325 MG/1; MG/1
1 TABLET ORAL EVERY 6 HOURS PRN
Status: DISPENSED | OUTPATIENT
Start: 2022-09-05 | End: 2022-09-06

## 2022-09-05 RX ORDER — INSULIN GLARGINE 100 [IU]/ML
20 INJECTION, SOLUTION SUBCUTANEOUS NIGHTLY
Status: DISCONTINUED | OUTPATIENT
Start: 2022-09-05 | End: 2022-09-11

## 2022-09-05 RX ORDER — INSULIN LISPRO 100 [IU]/ML
0-8 INJECTION, SOLUTION INTRAVENOUS; SUBCUTANEOUS
Status: DISCONTINUED | OUTPATIENT
Start: 2022-09-05 | End: 2022-09-05

## 2022-09-05 RX ORDER — INSULIN LISPRO 100 [IU]/ML
0-4 INJECTION, SOLUTION INTRAVENOUS; SUBCUTANEOUS
Status: DISCONTINUED | OUTPATIENT
Start: 2022-09-05 | End: 2022-09-12

## 2022-09-05 RX ADMIN — MEROPENEM 1000 MG: 1 INJECTION, POWDER, FOR SOLUTION INTRAVENOUS at 11:55

## 2022-09-05 RX ADMIN — CARVEDILOL 3.12 MG: 6.25 TABLET, FILM COATED ORAL at 07:55

## 2022-09-05 RX ADMIN — SODIUM CHLORIDE: 9 INJECTION, SOLUTION INTRAVENOUS at 00:48

## 2022-09-05 RX ADMIN — DOCUSATE SODIUM 100 MG: 100 CAPSULE, LIQUID FILLED ORAL at 07:55

## 2022-09-05 RX ADMIN — PANTOPRAZOLE SODIUM 40 MG: 40 TABLET, DELAYED RELEASE ORAL at 07:55

## 2022-09-05 RX ADMIN — SODIUM CHLORIDE, PRESERVATIVE FREE 5 ML: 5 INJECTION INTRAVENOUS at 23:02

## 2022-09-05 RX ADMIN — ENOXAPARIN SODIUM 40 MG: 100 INJECTION SUBCUTANEOUS at 16:21

## 2022-09-05 RX ADMIN — DOCUSATE SODIUM 100 MG: 100 CAPSULE, LIQUID FILLED ORAL at 20:36

## 2022-09-05 RX ADMIN — CARVEDILOL 3.12 MG: 6.25 TABLET, FILM COATED ORAL at 16:20

## 2022-09-05 RX ADMIN — INSULIN LISPRO 6 UNITS: 100 INJECTION, SOLUTION INTRAVENOUS; SUBCUTANEOUS at 12:15

## 2022-09-05 RX ADMIN — LEVOTHYROXINE SODIUM 75 MCG: 75 TABLET ORAL at 07:55

## 2022-09-05 RX ADMIN — PREGABALIN 50 MG: 50 CAPSULE ORAL at 07:56

## 2022-09-05 RX ADMIN — PREGABALIN 50 MG: 50 CAPSULE ORAL at 20:36

## 2022-09-05 RX ADMIN — ATORVASTATIN CALCIUM 80 MG: 40 TABLET, FILM COATED ORAL at 20:36

## 2022-09-05 RX ADMIN — Medication 10 MG: at 20:35

## 2022-09-05 RX ADMIN — POLYETHYLENE GLYCOL (3350) 17 G: 17 POWDER, FOR SOLUTION ORAL at 07:55

## 2022-09-05 RX ADMIN — ASPIRIN 81 MG CHEWABLE TABLET 81 MG: 81 TABLET CHEWABLE at 07:55

## 2022-09-05 RX ADMIN — AMLODIPINE BESYLATE 10 MG: 10 TABLET ORAL at 07:56

## 2022-09-05 RX ADMIN — POLYETHYLENE GLYCOL (3350) 17 G: 17 POWDER, FOR SOLUTION ORAL at 20:36

## 2022-09-05 RX ADMIN — MEROPENEM 1000 MG: 1 INJECTION, POWDER, FOR SOLUTION INTRAVENOUS at 00:49

## 2022-09-05 RX ADMIN — HYDROCODONE BITARTRATE AND ACETAMINOPHEN 1 TABLET: 5; 325 TABLET ORAL at 14:36

## 2022-09-05 RX ADMIN — ACETAMINOPHEN 650 MG: 325 TABLET ORAL at 03:57

## 2022-09-05 ASSESSMENT — PAIN - FUNCTIONAL ASSESSMENT: PAIN_FUNCTIONAL_ASSESSMENT: PREVENTS OR INTERFERES SOME ACTIVE ACTIVITIES AND ADLS

## 2022-09-05 ASSESSMENT — PAIN SCALES - WONG BAKER
WONGBAKER_NUMERICALRESPONSE: 0

## 2022-09-05 ASSESSMENT — PAIN DESCRIPTION - ORIENTATION: ORIENTATION: MID

## 2022-09-05 ASSESSMENT — PAIN SCALES - GENERAL
PAINLEVEL_OUTOF10: 3
PAINLEVEL_OUTOF10: 5

## 2022-09-05 ASSESSMENT — PAIN DESCRIPTION - LOCATION: LOCATION: BACK

## 2022-09-05 ASSESSMENT — PAIN DESCRIPTION - DESCRIPTORS: DESCRIPTORS: ACHING

## 2022-09-05 NOTE — PLAN OF CARE
Problem: Discharge Planning  Goal: Discharge to home or other facility with appropriate resources  9/5/2022 1000 by Shannon Givens LPN  Outcome: Progressing  9/5/2022 0230 by Rachel Eagle LPN  Outcome: Progressing     Problem: Skin/Tissue Integrity  Goal: Absence of new skin breakdown  Description: 1. Monitor for areas of redness and/or skin breakdown  2. Assess vascular access sites hourly  3. Every 4-6 hours minimum:  Change oxygen saturation probe site  4. Every 4-6 hours:  If on nasal continuous positive airway pressure, respiratory therapy assess nares and determine need for appliance change or resting period.   9/5/2022 1000 by Shannon Givens LPN  Outcome: Progressing  9/5/2022 0230 by Rachel Eagle LPN  Outcome: Progressing     Problem: Chronic Conditions and Co-morbidities  Goal: Patient's chronic conditions and co-morbidity symptoms are monitored and maintained or improved  9/5/2022 1000 by Shannon Givens LPN  Outcome: Progressing  9/5/2022 0230 by Rachel Eagle LPN  Outcome: Progressing     Problem: Pain  Goal: Verbalizes/displays adequate comfort level or baseline comfort level  9/5/2022 1000 by Shannon Givens LPN  Outcome: Progressing  9/5/2022 0230 by Rachel aEgle LPN  Outcome: Progressing     Problem: Safety - Adult  Goal: Free from fall injury  9/5/2022 1000 by Shannon Givens LPN  Outcome: Progressing  9/5/2022 0230 by Rachel Eagle LPN  Outcome: Progressing     Problem: Nutrition Deficit:  Goal: Optimize nutritional status  9/5/2022 1000 by Shannon Givens LPN  Outcome: Progressing  9/5/2022 0230 by Rachel Eagle LPN  Outcome: Progressing

## 2022-09-05 NOTE — PROGRESS NOTES
[facility], [address]   Progress Note  Psychiatric 0 1 2      Date: 2022   Patient: Cheyanne Chand   : 1940   DOA: 2022   MRN: [id number]   ROOM#: [id number]/[id number]     Admit Date: 2022     Collaborating Urologist on Call at time of admission: [place]    CC:     Subjective:     Pain: none, no nausea and no vomiting,   Bowel Movement/Flatus:   Yes  Voiding:  indwelling SP tube,       Objective:      Vitals:    BP (!) 141/67   Pulse 77   Temp 98.7 °F (37.1 °C) (Oral)   Resp 18   Ht 5' 8\" (1.727 m)   Wt 211 lb 6.4 oz (95.9 kg)   SpO2 97%   BMI 32.14 kg/m²    Temp  Av.5 °F (37.5 °C)  Min: 98.7 °F (37.1 °C)  Max: 99.8 °F (37.7 °C)     Intake/Output Summary (Last 24 hours) at 2022 1229  Last data filed at 2022 0706  Gross per 24 hour   Intake --   Output 1150 ml   Net -1150 ml       Physical Exam:    BP (!) 141/67   Pulse 77   Temp 98.7 °F (37.1 °C) (Oral)   Resp 18   Ht 5' 8\" (1.727 m)   Wt 211 lb 6.4 oz (95.9 kg)   SpO2 97%   BMI 32.14 kg/m²   General appearance: alert, appears stated age, and cooperative  Abdomen: soft, non-tender; bowel sounds normal; no masses,  no organomegaly    Labs:   WBC:    Lab Results   Component Value Date/Time    WBC 7.2 2022 12:26 AM      Hemoglobin/Hematocrit:    Lab Results   Component Value Date/Time    HGB 8.1 2022 12:26 AM    HCT 25.5 2022 12:26 AM      BMP:   Lab Results   Component Value Date/Time     2022 12:26 AM    K 4.3 2022 12:26 AM    K 3.9 2018 04:42 AM     2022 12:26 AM    CO2 24 2022 12:26 AM    BUN 22 2022 12:26 AM    LABALBU 3.0 2022 12:26 AM    CREATININE 1.9 2022 12:26 AM    CALCIUM 8.4 2022 12:26 AM    GFRAA 41 2022 12:26 AM    LABGLOM 34 2022 12:26 AM      PT/INR:    Lab Results   Component Value Date/Time    PROTIME 12.5 2022 09:51 AM    PROTIME 15.2 2011 06:48 PM    INR 0.97 2022 09:51 AM PTT:    Lab Results   Component Value Date/Time    APTT 33.6 04/18/2022 09:51 AM        Blood Culture:  1/4 E coli   Urine Culture:  >50k    Imaging:   CT ABDOMEN PELVIS WO CONTRAST Additional Contrast? None    Result Date: 9/1/2022  EXAMINATION: CT OF THE ABDOMEN AND PELVIS WITHOUT CONTRAST 9/1/2022 3:06 pm TECHNIQUE: CT of the abdomen and pelvis was performed without the administration of intravenous contrast. Multiplanar reformatted images are provided for review. Automated exposure control, iterative reconstruction, and/or weight based adjustment of the mA/kV was utilized to reduce the radiation dose to as low as reasonably achievable. COMPARISON: 04/18/2022. HISTORY: ORDERING SYSTEM PROVIDED HISTORY: abdominal pain TECHNOLOGIST PROVIDED HISTORY: Reason for exam:->abdominal pain Additional Contrast?->None Reason for Exam: abdominal pain, POSSIBLE UTI FINDINGS: Lower Chest: The visualized lung bases demonstrate subsegmental atelectasis/scarring. Trace pericardial fluid. Questionable trace bilateral pleural fluid. Atherosclerosis in the visualized thoracic aorta. Coronary artery calcifications. Small hiatal hernia. Liver: Normal. Gallbladder and Bile Ducts: Normal. Spleen: Normal. Adrenal Glands: Normal. Pancreas: Normal. Genitourinary: Left ureteral stent with minimal dilatation of the left urinary tract. Mild dilatation of the right urinary tract. Mild dilatation of the right urinary tract with increased right perinephric and periureteral stranding. No definite urinary stones. Suprapubic catheter in the decompressed urinary bladder. Bowel: Normal caliber bowel. Normal appendix. No significant diverticular disease. Vasculature: Atherosclerosis. Normal caliber abdominal aorta. Bones and Soft Tissues: Fat containing right inguinal hernia. Degenerative changes in the visualized spine and pelvis. Retroperitoneum/Mesentery: No intraperitoneal free air, ascites or fluid collection.   No lymphadenopathy in the abdomen or pelvis. Stranding/fluid throughout the retroperitoneum. 1.  Left ureteral stent in place. Mild bilateral urinary tract dilatation with new right perinephric and periureteral stranding raises concern for urinary tract infection. No urinary stones. 2.  Suprapubic catheter in the decompressed urinary bladder. 3.  Ill-defined stranding/fluid throughout the retroperitoneum may relate to vascular congestion or reactive changes. No intraperitoneal free air or abscess. XR CHEST PORTABLE    Result Date: 8/28/2022  EXAMINATION: ONE XRAY VIEW OF THE CHEST 8/28/2022 3:37 pm COMPARISON: 04/18/2022 HISTORY: ORDERING SYSTEM PROVIDED HISTORY: emergency TECHNOLOGIST PROVIDED HISTORY: Reason for exam:->emergency Reason for Exam: fatigue, emergency, fever FINDINGS: Increased interstitial opacities seen throughout both lungs. A focal infiltrate is not identified. The cardial pericardial silhouette is unremarkable. No pneumothorax is seen. No free air. No acute bony abnormality. Increased interstitial opacity. While much or all of this may be chronic, please correlate with any clinical evidence of acute interstitial edema. US ABDOMEN LIMITED Specify organ? LIVER, GALLBLADDER    Result Date: 9/3/2022  EXAMINATION: RIGHT UPPER QUADRANT ULTRASOUND 9/3/2022 4:43 am COMPARISON: CT 09/01/2022, ultrasound 05/06/2020 HISTORY: ORDERING SYSTEM PROVIDED HISTORY: abdominal pain TECHNOLOGIST PROVIDED HISTORY: Reason for exam:->abdominal pain Specify organ?->LIVER Specify organ?->GALLBLADDER FINDINGS: LIVER:  The liver demonstrates normal echogenicity without evidence of intrahepatic biliary ductal dilatation. BILIARY SYSTEM:  Gallbladder is unremarkable without evidence of pericholecystic fluid, wall thickening or stones. Negative sonographic Lima's sign. Common bile duct is within normal limits measuring 3 mm. RIGHT KIDNEY: The right kidney is grossly unremarkable without evidence of hydronephrosis. PANCREAS:  Visualized portions of the pancreas are unremarkable. OTHER: No evidence of right upper quadrant ascites. Unremarkable right upper quadrant ultrasound. Assessment & Plan:      Ming Michaels is a 78y.o. year old male admitted 8/28/2022 for fatigue    1) Chronic Urinary Retention: managed with suprapubic catheter and exchanged monthly, last exchanged upon admission 8/28/22. Recommend SPT exchanges q3 weeks. 2) Chronic Left Hydronephrosis: s/p cystoscopy, left ureteral stent exchange 6/29/22. S/p cystoscopy, left ureteral stent placement 7/4/21              CT a/p 9/1/22: Left ureteral stent in place. Mild bilateral urinary tract dilatation with new right perinephric and periureteral stranding raises concern for urinary tract infection. No urinary stones. Suprapubic catheter in the decompressed urinary bladder. Recommend left ureteral stent exchanges q3 months. Pt's family would like to have stent changed while in house. Plan for procedure after 7 days of IV abx.     3) Sepsis w Complicated UTI: urine cx 8/29/22 >50K CFU/ml mixed growth, likely contaminated. Blood cultures 4/4 +ESBL E.coli              WBC 7.2,  afebrile              On IV Merrem   Recommend irrigate SP w saline 100 ml daily. 4) H/o Prostate Cancer: S/p RPP with Dr. Lit Kennedy in [address] [place] [address] PSA 0.93 2/2021. On Eligard q6 months      Patient seen and examined, chart reviewed.      Electronically signed by Felicitas Crowley MD on 9/5/2022 at 12:29 PM

## 2022-09-05 NOTE — PROGRESS NOTES
V2.0  [facility] Hospitalist Progress Note      Name:  Claudeen Bare /Age/Sex: 1940  (78 y.o. male)   MRN & CSN:  [id number] & [id number] Encounter Date/Time: 2022 1:46 PM EDT    Location:  [address] [place] [address] PCP: Robin Lo MD       Hospital Day: 9    Assessment and Plan:   Claudeen Bare is a 78 y.o. male with pmh of prostate cancer, diabetes mellitus, hypertension who admitted with sciatic found out with Complicated UTI (urinary tract infection)    # ESBL bacteremia: Secondary to UTI  # Complicated UTI  - Urine culture on  growing E. coli resistant to Cipro and cefepime  - Urine culture on  no significant growth  - CT abdomen/pelvis showing left ureteral stent in place, mild bilateral urinary tract dilatation with new right perinephric and periureteral stranding raises concern for UTI  - Ultrasound abdomen unremarkable  - Blood cultures  no growth  - Continue IV meropenem started on   - ID consult pending  - PT/OT evaluation pending for possible SNF    # History of prostate cancer status post prostatectomy      # Hypertension: Blood pressure stable continue on current medication     # Hypothyroidism continue levothyroxine     # CKD  - Avoid nephrotoxic      # Type 2 diabetes: Continue SSI, hypoglycemic protocol diabetic diet      Diet ADULT DIET; Dysphagia - Soft and Bite Sized; 5 carb choices (75 gm/meal); Low Fat/Low Chol/High Fiber/JORGE  ADULT ORAL NUTRITION SUPPLEMENT; Lunch, Dinner; Low Calorie/High Protein Oral Supplement  ADULT ORAL NUTRITION SUPPLEMENT; Breakfast; Other Oral Supplement;  Kefir   DVT Prophylaxis [x] Lovenox, []  Heparin, [] SCDs, [] Ambulation,  [] Eliquis, [] Xarelto  [] Coumadin   Code Status Full Code   Disposition From: Home  Expected Disposition: Home versus SNF  Estimated Date of Discharge: TBD  Patient requires continued admission due to IV antibiotics   Surrogate Decision Maker/ POA Son     Subjective:     Chief Complaint: Fatigue (General weakness started this am. Family called because pt had changed from baseline. Currently being tx for UTI. Pt is slightly confused, doesn't know the year. He has been N/V. )     Patient seen and examined at bedside, no overnight complaints, denies any fever, chills, urinary symptoms         Review of Systems:    Review of Systems    Constitutional: No fever no chills  HEENT: No headache no dizziness  Cardiovascular: No chest pain no palpitations  Respiratory: No cough no shortness of breath  Abdomen: No diarrhea, no nausea, no vomiting, no abdominal pain  Musculoskeletal: No swelling  Neuro: No numbness or tingling  Skin: No rash      Objective: Intake/Output Summary (Last 24 hours) at 9/5/2022 1346  Last data filed at 9/5/2022 0706  Gross per 24 hour   Intake --   Output 1150 ml   Net -1150 ml        Vitals:   Vitals:    09/05/22 0748   BP: (!) 141/67   Pulse: 77   Resp: 18   Temp: 98.7 °F (37.1 °C)   SpO2: 97%       Physical Exam:     General: Afebrile no distress  Eyes: EOMI  ENT: neck supple  Cardiovascular: S1-S2 normal no murmur  Respiratory: Clear to auscultation  Gastrointestinal: Soft, non tender  Genitourinary: no suprapubic tenderness  Musculoskeletal: No edema  Skin: warm, dry  Neuro: Alert. Psych: Mood appropriate.      Medications:   Medications:    insulin glargine  15 Units SubCUTAneous Nightly    insulin lispro  0-8 Units SubCUTAneous TID WC    carvedilol  3.125 mg Oral BID WC    insulin lispro  0-4 Units SubCUTAneous 2 times per day    meropenem  1,000 mg IntraVENous Q12H    amLODIPine  10 mg Oral Daily    aspirin  81 mg Oral Daily    atorvastatin  80 mg Oral Nightly    levothyroxine  75 mcg Oral Daily    docusate sodium  100 mg Oral BID    bisacodyl  10 mg Rectal Daily    melatonin  10 mg Oral Nightly    pantoprazole  40 mg Oral QAM AC    polyethylene glycol  17 g Oral BID    pregabalin  50 mg Oral BID    sodium chloride flush  5-40 mL IntraVENous 2 times per day    enoxaparin  40 mg SubCUTAneous QPM Infusions:    sodium chloride      dextrose      sodium chloride 50 mL/hr at 09/05/22 0048     PRN Meds: hydrALAZINE (APRESOLINE) ivpb, 10 mg, Q4H PRN  magnesium hydroxide, 30 mL, Daily PRN  sodium chloride flush, 5-40 mL, PRN  sodium chloride, , PRN  ondansetron, 4 mg, Q8H PRN   Or  ondansetron, 4 mg, Q6H PRN  aluminum & magnesium hydroxide-simethicone, 30 mL, Q6H PRN  acetaminophen, 650 mg, Q6H PRN   Or  acetaminophen, 650 mg, Q6H PRN  glucose, 4 tablet, PRN  dextrose bolus, 125 mL, PRN   Or  dextrose bolus, 250 mL, PRN  glucagon (rDNA), 1 mg, PRN  dextrose, , Continuous PRN  ipratropium-albuterol, 1 vial, BID PRN        Labs      Recent Results (from the past 24 hour(s))   POCT Glucose    Collection Time: 09/04/22  5:02 PM   Result Value Ref Range    POC Glucose 199 (H) 70 - 99 MG/DL   POCT Glucose    Collection Time: 09/04/22  8:05 PM   Result Value Ref Range    POC Glucose 230 (H) 70 - 99 MG/DL   CBC    Collection Time: 09/05/22 12:26 AM   Result Value Ref Range    WBC 7.2 4.0 - 10.5 K/CU MM    RBC 2.71 (L) 4.6 - 6.2 M/CU MM    Hemoglobin 8.1 (L) 13.5 - 18.0 GM/DL    Hematocrit 25.5 (L) 42 - 52 %    MCV 94.1 78 - 100 FL    MCH 29.9 27 - 31 PG    MCHC 31.8 (L) 32.0 - 36.0 %    RDW 14.9 11.7 - 14.9 %    Platelets 643 729 - 260 K/CU MM    MPV 10.8 7.5 - 11.1 FL   Comprehensive Metabolic Panel    Collection Time: 09/05/22 12:26 AM   Result Value Ref Range    Sodium 140 135 - 145 MMOL/L    Potassium 4.3 3.5 - 5.1 MMOL/L    Chloride 106 99 - 110 mMol/L    CO2 24 21 - 32 MMOL/L    BUN 22 6 - 23 MG/DL    Creatinine 1.9 (H) 0.9 - 1.3 MG/DL    Glucose 215 (H) 70 - 99 MG/DL    Calcium 8.4 8.3 - 10.6 MG/DL    Albumin 3.0 (L) 3.4 - 5.0 GM/DL    Total Protein 5.4 (L) 6.4 - 8.2 GM/DL    Total Bilirubin 0.2 0.0 - 1.0 MG/DL    ALT 14 10 - 40 U/L    AST 17 15 - 37 IU/L    Alkaline Phosphatase 93 40 - 128 IU/L    GFR Non- 34 (L) >60 mL/min/1.73m2    GFR  41 (L) >60 mL/min/1.73m2    Anion Gap 10 4 - 16   POCT Glucose    Collection Time: 09/05/22  7:53 AM   Result Value Ref Range    POC Glucose 185 (H) 70 - 99 MG/DL   POCT Glucose    Collection Time: 09/05/22 12:10 PM   Result Value Ref Range    POC Glucose 301 (H) 70 - 99 MG/DL        Imaging/Diagnostics Last 24 Hours   No results found.     Electronically signed by Nany Pappas MD on 9/5/2022 at 1:46 PM

## 2022-09-05 NOTE — PROGRESS NOTES
Progress Note( Dr. Aleja Fatima)  9/5/2022  Subjective:   Admit Date: 8/28/2022  PCP: Chet House MD    Admitted For :Fatigue and possible sepsis from UTI    Consulted For: Control of blood glucose as patient has having hypoglycemic episodes    Interval History: feeling Somewhat better had  no more episode of low blood glucose yesterday    Denies any chest pains,   Denies SOB . Denies nausea or vomiting. No new bowel or bladder symptoms. Intake/Output Summary (Last 24 hours) at 9/5/2022 1857  Last data filed at 9/5/2022 1842  Gross per 24 hour   Intake --   Output 1200 ml   Net -1200 ml         DATA    CBC:   Recent Labs     09/03/22  1950 09/04/22  0557 09/05/22  0026   WBC 7.1 6.5 7.2   HGB 8.6* 8.4* 8.1*    236 263      CMP:  Recent Labs     09/03/22  0544 09/03/22  1950 09/05/22  0026    141 140   K 4.3 4.3 4.3    106 106   CO2 22 23 24   BUN 21 20 22   CREATININE 1.8* 1.6* 1.9*   CALCIUM 8.4 8.5 8.4   PROT 5.2* 5.7* 5.4*   LABALBU 2.8* 3.2* 3.0*   BILITOT 0.3 0.4 0.2   ALKPHOS 97 98 93   AST 18 19 17   ALT 15 15 14       Lipids: No results found for: CHOL, HDL, TRIG  Glucose:  Recent Labs     09/05/22  0753 09/05/22  1210 09/05/22  1711   POCGLU 185* 301* 193*       [id number]:  Lab Results   Component Value Date/Time    LABA1C 7.8 08/29/2022 01:30 AM     High Sensitivity TSH:   Lab Results   Component Value Date/Time    TSHHS 7.590 04/28/2022 06:59 AM     Free T3: No results found for: FT3  Free T4:  Lab Results   Component Value Date/Time    T4FREE 1.15 03/27/2022 08:22 AM       US ABDOMEN LIMITED Specify organ? LIVER, GALLBLADDER   Final Result   Unremarkable right upper quadrant ultrasound. CT ABDOMEN PELVIS WO CONTRAST Additional Contrast? None   Final Result   1. Left ureteral stent in place. Mild bilateral urinary tract dilatation   with new right perinephric and periureteral stranding raises concern for   urinary tract infection. No urinary stones.       2. Suprapubic catheter in the decompressed urinary bladder. 3.  Ill-defined stranding/fluid throughout the retroperitoneum may relate to   vascular congestion or reactive changes. No intraperitoneal free air or   abscess. XR CHEST PORTABLE   Final Result   Increased interstitial opacity. While much or all of this may be chronic,   please correlate with any clinical evidence of acute interstitial edema. Scheduled Medicines   Medications:    insulin glargine  15 Units SubCUTAneous Nightly    insulin lispro  0-8 Units SubCUTAneous TID WC    lidocaine  1 patch TransDERmal Daily    carvedilol  3.125 mg Oral BID WC    insulin lispro  0-4 Units SubCUTAneous 2 times per day    meropenem  1,000 mg IntraVENous Q12H    amLODIPine  10 mg Oral Daily    aspirin  81 mg Oral Daily    atorvastatin  80 mg Oral Nightly    levothyroxine  75 mcg Oral Daily    docusate sodium  100 mg Oral BID    bisacodyl  10 mg Rectal Daily    melatonin  10 mg Oral Nightly    pantoprazole  40 mg Oral QAM AC    polyethylene glycol  17 g Oral BID    pregabalin  50 mg Oral BID    sodium chloride flush  5-40 mL IntraVENous 2 times per day    enoxaparin  40 mg SubCUTAneous QPM      Infusions:    sodium chloride      dextrose      sodium chloride 50 mL/hr at 09/05/22 0048         Objective:   Vitals: BP (!) 151/68   Pulse 78   Temp 98.6 °F (37 °C) (Oral)   Resp 18   Ht 5' 8\" (1.727 m)   Wt 211 lb 6.4 oz (95.9 kg)   SpO2 100%   BMI 32.14 kg/m²   General appearance: alert and cooperative with exam  Neck: no JVD or bruit  Thyroid : Normal lobes   Lungs: Has Vesicular Breath sounds   Heart:  regular rate and rhythm  Abdomen: soft, non-tender; bowel sounds normal; no masses,  no organomegaly  Musculoskeletal: Normal  Extremities: extremities normal, , no edema  Neurologic:  Awake, alert, oriented to name, place and time. Cranial nerves II-XII are grossly intact. Motor weakness. Sensory neuropathy. ,  and gait is abnormal. Stable    Assessment:     Patient Active Problem List:     Gait disturbance     Generalized weakness     Diabetes mellitus (HCC)     Osteoarthritis     Anemia of chronic disorder     Infected implanted bladder sphincter cuff     Escherichia coli urinary tract infection     Hypertension     Sepsis ([address]     Acute encephalopathy     Type 2 diabetes mellitus with hypoglycemia without coma (HCC)     UTI (urinary tract infection) due to urinary indwelling catheter (HCC)     Hyperkalemia     Acute kidney failure (HCC)     Leg weakness, bilateral     Type 2 diabetes mellitus with neurological manifestations, uncontrolled ([address]     Complicated UTI (urinary tract infection)     Essential hypertension     Severe muscle deconditioning     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 (HCC)     Frequent falls     Chronic kidney disease, stage 3a ([address]     Uncontrolled type 2 diabetes mellitus with peripheral neuropathy (HCC)     Prostate cancer (HCC)     Suprapubic catheter ([address]     Sepsis due to urinary tract infection ([address]     Coagulase negative Staphylococcus bacteremia     Chronic kidney disease, stage IV (severe) (HCC)     Acute metabolic encephalopathy     SVT (supraventricular tachycardia) (HCC)     Metabolic encephalopathy     History of prostate cancer     Cigarette nicotine dependence without complication     Altered mental status     Serratia marcescens infection     Hypoglycemia      Plan:     Reviewed POC blood glucose . Labs and X ray results   Reviewed Current Medicines   On Correction bolus Humalog/ Basal Lantus Insulin regime /   Monitor Blood glucose frequently   Modified  the dose of Insulin/ other medicines as neede  Management is working to show the patient to skilled nursing unit possibly [facility] home  Will follow     .      Francia Galvez MD, MD

## 2022-09-05 NOTE — PLAN OF CARE
Problem: Discharge Planning  Goal: Discharge to home or other facility with appropriate resources  9/5/2022 0230 by Bruce Guevara LPN  Outcome: Progressing  9/4/2022 1333 by Zhanna Bird LPN  Outcome: Progressing     Problem: Skin/Tissue Integrity  Goal: Absence of new skin breakdown  Description: 1. Monitor for areas of redness and/or skin breakdown  2. Assess vascular access sites hourly  3. Every 4-6 hours minimum:  Change oxygen saturation probe site  4. Every 4-6 hours:  If on nasal continuous positive airway pressure, respiratory therapy assess nares and determine need for appliance change or resting period.   9/5/2022 0230 by Bruce Guevara LPN  Outcome: Progressing  9/4/2022 1333 by Zhanna Bird LPN  Outcome: Progressing     Problem: Chronic Conditions and Co-morbidities  Goal: Patient's chronic conditions and co-morbidity symptoms are monitored and maintained or improved  9/5/2022 0230 by Bruce Guevara LPN  Outcome: Progressing  9/4/2022 1333 by Zhanna Bird LPN  Outcome: Progressing     Problem: Pain  Goal: Verbalizes/displays adequate comfort level or baseline comfort level  9/5/2022 0230 by Bruce Guevara LPN  Outcome: Progressing  9/4/2022 1333 by Zhanna Bird LPN  Outcome: Progressing     Problem: Safety - Adult  Goal: Free from fall injury  9/5/2022 0230 by Bruce Guevara LPN  Outcome: Progressing  9/4/2022 1333 by Zhanna Bird LPN  Outcome: Progressing     Problem: Nutrition Deficit:  Goal: Optimize nutritional status  9/5/2022 0230 by Bruce Guevara LPN  Outcome: Progressing  9/4/2022 1333 by Zhanna Bird LPN  Outcome: Progressing

## 2022-09-06 ENCOUNTER — APPOINTMENT (OUTPATIENT)
Dept: GENERAL RADIOLOGY | Age: 82
DRG: 853 | End: 2022-09-06
Payer: MEDICARE

## 2022-09-06 ENCOUNTER — ANESTHESIA EVENT (OUTPATIENT)
Dept: OPERATING ROOM | Age: 82
DRG: 853 | End: 2022-09-06
Payer: MEDICARE

## 2022-09-06 LAB
ANION GAP SERPL CALCULATED.3IONS-SCNC: 8 MMOL/L (ref 4–16)
BASOPHILS ABSOLUTE: 0 K/CU MM
BASOPHILS RELATIVE PERCENT: 0.2 % (ref 0–1)
BUN BLDV-MCNC: 20 MG/DL (ref 6–23)
CALCIUM SERPL-MCNC: 8 MG/DL (ref 8.3–10.6)
CHLORIDE BLD-SCNC: 110 MMOL/L (ref 99–110)
CO2: 25 MMOL/L (ref 21–32)
CREAT SERPL-MCNC: 1.8 MG/DL (ref 0.9–1.3)
CULTURE: NORMAL
DIFFERENTIAL TYPE: ABNORMAL
EOSINOPHILS ABSOLUTE: 0.3 K/CU MM
EOSINOPHILS RELATIVE PERCENT: 4.2 % (ref 0–3)
GFR AFRICAN AMERICAN: 44 ML/MIN/1.73M2
GFR NON-AFRICAN AMERICAN: 36 ML/MIN/1.73M2
GLUCOSE BLD-MCNC: 149 MG/DL (ref 70–99)
GLUCOSE BLD-MCNC: 158 MG/DL (ref 70–99)
GLUCOSE BLD-MCNC: 159 MG/DL (ref 70–99)
GLUCOSE BLD-MCNC: 177 MG/DL (ref 70–99)
GLUCOSE BLD-MCNC: 248 MG/DL (ref 70–99)
GLUCOSE BLD-MCNC: 256 MG/DL (ref 70–99)
HCT VFR BLD CALC: 23.5 % (ref 42–52)
HEMOGLOBIN: 7.4 GM/DL (ref 13.5–18)
IMMATURE NEUTROPHIL %: 0.9 % (ref 0–0.43)
LYMPHOCYTES ABSOLUTE: 1.5 K/CU MM
LYMPHOCYTES RELATIVE PERCENT: 17.9 % (ref 24–44)
Lab: NORMAL
MCH RBC QN AUTO: 29.6 PG (ref 27–31)
MCHC RBC AUTO-ENTMCNC: 31.5 % (ref 32–36)
MCV RBC AUTO: 94 FL (ref 78–100)
MONOCYTES ABSOLUTE: 0.8 K/CU MM
MONOCYTES RELATIVE PERCENT: 9.2 % (ref 0–4)
NUCLEATED RBC %: 0 %
PDW BLD-RTO: 14.9 % (ref 11.7–14.9)
PLATELET # BLD: 297 K/CU MM (ref 140–440)
PMV BLD AUTO: 10.1 FL (ref 7.5–11.1)
POTASSIUM SERPL-SCNC: 4.4 MMOL/L (ref 3.5–5.1)
RBC # BLD: 2.5 M/CU MM (ref 4.6–6.2)
SEGMENTED NEUTROPHILS ABSOLUTE COUNT: 5.5 K/CU MM
SEGMENTED NEUTROPHILS RELATIVE PERCENT: 67.6 % (ref 36–66)
SODIUM BLD-SCNC: 143 MMOL/L (ref 135–145)
SPECIMEN: NORMAL
TOTAL IMMATURE NEUTOROPHIL: 0.07 K/CU MM
TOTAL NUCLEATED RBC: 0 K/CU MM
WBC # BLD: 8.1 K/CU MM (ref 4–10.5)

## 2022-09-06 PROCEDURE — 6370000000 HC RX 637 (ALT 250 FOR IP): Performed by: INTERNAL MEDICINE

## 2022-09-06 PROCEDURE — 97530 THERAPEUTIC ACTIVITIES: CPT

## 2022-09-06 PROCEDURE — 6370000000 HC RX 637 (ALT 250 FOR IP): Performed by: HOSPITALIST

## 2022-09-06 PROCEDURE — 94761 N-INVAS EAR/PLS OXIMETRY MLT: CPT

## 2022-09-06 PROCEDURE — 6360000002 HC RX W HCPCS: Performed by: STUDENT IN AN ORGANIZED HEALTH CARE EDUCATION/TRAINING PROGRAM

## 2022-09-06 PROCEDURE — 71045 X-RAY EXAM CHEST 1 VIEW: CPT

## 2022-09-06 PROCEDURE — C1751 CATH, INF, PER/CENT/MIDLINE: HCPCS

## 2022-09-06 PROCEDURE — 36415 COLL VENOUS BLD VENIPUNCTURE: CPT

## 2022-09-06 PROCEDURE — 6370000000 HC RX 637 (ALT 250 FOR IP): Performed by: STUDENT IN AN ORGANIZED HEALTH CARE EDUCATION/TRAINING PROGRAM

## 2022-09-06 PROCEDURE — 1200000000 HC SEMI PRIVATE

## 2022-09-06 PROCEDURE — 76937 US GUIDE VASCULAR ACCESS: CPT

## 2022-09-06 PROCEDURE — 99223 1ST HOSP IP/OBS HIGH 75: CPT | Performed by: INTERNAL MEDICINE

## 2022-09-06 PROCEDURE — 2580000003 HC RX 258: Performed by: STUDENT IN AN ORGANIZED HEALTH CARE EDUCATION/TRAINING PROGRAM

## 2022-09-06 PROCEDURE — 2580000003 HC RX 258: Performed by: HOSPITALIST

## 2022-09-06 PROCEDURE — 85025 COMPLETE CBC W/AUTO DIFF WBC: CPT

## 2022-09-06 PROCEDURE — 36410 VNPNXR 3YR/> PHY/QHP DX/THER: CPT

## 2022-09-06 PROCEDURE — 82962 GLUCOSE BLOOD TEST: CPT

## 2022-09-06 PROCEDURE — 80048 BASIC METABOLIC PNL TOTAL CA: CPT

## 2022-09-06 PROCEDURE — 05H933Z INSERTION OF INFUSION DEVICE INTO RIGHT BRACHIAL VEIN, PERCUTANEOUS APPROACH: ICD-10-PCS | Performed by: INTERNAL MEDICINE

## 2022-09-06 PROCEDURE — 6360000002 HC RX W HCPCS: Performed by: HOSPITALIST

## 2022-09-06 RX ORDER — GUAIFENESIN/DEXTROMETHORPHAN 100-10MG/5
5 SYRUP ORAL EVERY 4 HOURS PRN
Status: DISCONTINUED | OUTPATIENT
Start: 2022-09-06 | End: 2022-09-21 | Stop reason: HOSPADM

## 2022-09-06 RX ORDER — CARVEDILOL 6.25 MG/1
6.25 TABLET ORAL 2 TIMES DAILY WITH MEALS
Status: DISCONTINUED | OUTPATIENT
Start: 2022-09-06 | End: 2022-09-21 | Stop reason: HOSPADM

## 2022-09-06 RX ORDER — FUROSEMIDE 10 MG/ML
40 INJECTION INTRAMUSCULAR; INTRAVENOUS ONCE
Status: COMPLETED | OUTPATIENT
Start: 2022-09-06 | End: 2022-09-06

## 2022-09-06 RX ADMIN — INSULIN LISPRO 8 UNITS: 100 INJECTION, SOLUTION INTRAVENOUS; SUBCUTANEOUS at 17:21

## 2022-09-06 RX ADMIN — ASPIRIN 81 MG CHEWABLE TABLET 81 MG: 81 TABLET CHEWABLE at 08:55

## 2022-09-06 RX ADMIN — MEROPENEM 1000 MG: 1 INJECTION, POWDER, FOR SOLUTION INTRAVENOUS at 12:48

## 2022-09-06 RX ADMIN — ENOXAPARIN SODIUM 40 MG: 100 INJECTION SUBCUTANEOUS at 17:20

## 2022-09-06 RX ADMIN — SODIUM CHLORIDE 25 ML: 9 INJECTION, SOLUTION INTRAVENOUS at 23:27

## 2022-09-06 RX ADMIN — AMLODIPINE BESYLATE 10 MG: 10 TABLET ORAL at 08:56

## 2022-09-06 RX ADMIN — MEROPENEM 1000 MG: 1 INJECTION, POWDER, FOR SOLUTION INTRAVENOUS at 23:28

## 2022-09-06 RX ADMIN — BISACODYL 10 MG: 10 SUPPOSITORY RECTAL at 08:55

## 2022-09-06 RX ADMIN — FUROSEMIDE 40 MG: 10 INJECTION, SOLUTION INTRAMUSCULAR; INTRAVENOUS at 14:40

## 2022-09-06 RX ADMIN — ACETAMINOPHEN 650 MG: 325 TABLET ORAL at 14:40

## 2022-09-06 RX ADMIN — Medication 10 MG: at 21:01

## 2022-09-06 RX ADMIN — CARVEDILOL 3.12 MG: 6.25 TABLET, FILM COATED ORAL at 08:56

## 2022-09-06 RX ADMIN — INSULIN LISPRO 4 UNITS: 100 INJECTION, SOLUTION INTRAVENOUS; SUBCUTANEOUS at 12:50

## 2022-09-06 RX ADMIN — DOCUSATE SODIUM 100 MG: 100 CAPSULE, LIQUID FILLED ORAL at 08:55

## 2022-09-06 RX ADMIN — PREGABALIN 50 MG: 50 CAPSULE ORAL at 08:55

## 2022-09-06 RX ADMIN — PANTOPRAZOLE SODIUM 40 MG: 40 TABLET, DELAYED RELEASE ORAL at 06:10

## 2022-09-06 RX ADMIN — PREGABALIN 50 MG: 50 CAPSULE ORAL at 21:01

## 2022-09-06 RX ADMIN — ATORVASTATIN CALCIUM 80 MG: 40 TABLET, FILM COATED ORAL at 21:00

## 2022-09-06 RX ADMIN — MEROPENEM 1000 MG: 1 INJECTION, POWDER, FOR SOLUTION INTRAVENOUS at 00:17

## 2022-09-06 RX ADMIN — CARVEDILOL 6.25 MG: 6.25 TABLET, FILM COATED ORAL at 17:20

## 2022-09-06 RX ADMIN — SODIUM CHLORIDE, PRESERVATIVE FREE 10 ML: 5 INJECTION INTRAVENOUS at 21:01

## 2022-09-06 RX ADMIN — POLYETHYLENE GLYCOL (3350) 17 G: 17 POWDER, FOR SOLUTION ORAL at 21:00

## 2022-09-06 RX ADMIN — LEVOTHYROXINE SODIUM 75 MCG: 75 TABLET ORAL at 06:10

## 2022-09-06 RX ADMIN — DOCUSATE SODIUM 100 MG: 100 CAPSULE, LIQUID FILLED ORAL at 21:00

## 2022-09-06 RX ADMIN — POLYETHYLENE GLYCOL (3350) 17 G: 17 POWDER, FOR SOLUTION ORAL at 08:55

## 2022-09-06 ASSESSMENT — PAIN SCALES - WONG BAKER
WONGBAKER_NUMERICALRESPONSE: 0
WONGBAKER_NUMERICALRESPONSE: 4
WONGBAKER_NUMERICALRESPONSE: 4

## 2022-09-06 ASSESSMENT — PAIN DESCRIPTION - DESCRIPTORS: DESCRIPTORS: BURNING

## 2022-09-06 ASSESSMENT — PAIN SCALES - GENERAL
PAINLEVEL_OUTOF10: 0
PAINLEVEL_OUTOF10: 5

## 2022-09-06 ASSESSMENT — PAIN DESCRIPTION - LOCATION: LOCATION: HAND

## 2022-09-06 ASSESSMENT — PAIN DESCRIPTION - ORIENTATION: ORIENTATION: RIGHT;LEFT

## 2022-09-06 NOTE — PROGRESS NOTES
Physician Progress Note      PATIENT:               Dre Ayala  CSN #:                  [id number]  :                       1940  ADMIT DATE:       2022 6:18 PM  [address] [place] DATE:  RESPONDING  PROVIDER #:        Riaz Gonzalez MD          QUERY TEXT:    Hospitalists,    Pt admitted with UTI. Pt noted to have ureteral stent with last exchange on   22 per Urology. If possible, please document in the progress notes and   discharge summary if you are evaluating and/or treating any of the following: The medical record reflects the following:  Risk Factors: ureteral stent, SP cath  Clinical Indicators: cystoscopy with ureteral stent exchange on 22 and   chronic suprapubic cath  Treatment: labs, imaging, Urology consult, IV atb    Thank you,  Rick Greer RN CDS  [phone]  Options provided:  -- UTI due to ureteral stent  -- UTI as a complication of cystoscopy on 22  -- UTI is unrelated to the cystoscopy and the ureteral stent  -- Other - I will add my own diagnosis  -- Disagree - Not applicable / Not valid  -- Disagree - Clinically unable to determine / Unknown  -- Refer to Clinical Documentation Reviewer    PROVIDER RESPONSE TEXT:    UTI is unelated to the cystoscopy procedure and the ureteral stent. Query created by: Catherine Nobles on 2022 10:12 PM      QUERY TEXT:    Hospitalists,    Patient admitted with  sepsis due to UTI and noted sepsis dropped from   documentation. .  If possible, please document in the progress notes and   discharge summary if sepsis was:     The medical record reflects the following:  Risk Factors: UTI  Clinical Indicators: sepsis dur to UTI documented in H&P and also by Urology   due to complicated UTI, +SIRS on admit, metabolic encephalopathy, WBC  20.5,   ESBL bacteremia now documented  Treatment: labs, imaging, IV atb, Urology consult    Thank you,  Rick Greer RN CDS  [phone]  Options provided:  -- Sepsis confirmed after study  -- Sepsis treated and resolved  -- Sepsis ruled out after study  -- Other - I will add my own diagnosis  -- Disagree - Not applicable / Not valid  -- Disagree - Clinically unable to determine / Unknown  -- Refer to Clinical Documentation Reviewer    PROVIDER RESPONSE TEXT:    Sepsis treated and resolved.     Query created by: Anai Contreras on 9/2/2022 2:56 PM      Electronically signed by:  Jessica Tsai MD 9/6/2022 3:11 PM

## 2022-09-06 NOTE — CONSULTS
Consult reviewed with primary nurse. Midline to be placed for long term antibiotics. Education, benefits and risks discussed with Patient. Patient verbalized understanding. Unable to placed midline in cephalic vein d/t vein is not large enough to accommodate line. Arrowg+beryl Blue Advanced Midline 4.5fr 15cm catheter placed in JAYDA in the brachial vein using sterile ultrasound-technique. Placement verified via ultrasound visualization of catheter within the vessel lumen. Catheter returns blood briskly and flushes with ease and no abnormalities noted. Patient tolerated well. Photos of vein diameter placed in patient's chart. Please consult IV/PICC Team for any questions or if patient's needs change.

## 2022-09-06 NOTE — PLAN OF CARE
Problem: Discharge Planning  Goal: Discharge to home or other facility with appropriate resources  Outcome: Progressing  Flowsheets (Taken 9/6/2022 0845)  Discharge to home or other facility with appropriate resources: Identify barriers to discharge with patient and caregiver     Problem: Skin/Tissue Integrity  Goal: Absence of new skin breakdown  Description: 1. Monitor for areas of redness and/or skin breakdown  2. Assess vascular access sites hourly  3. Every 4-6 hours minimum:  Change oxygen saturation probe site  4. Every 4-6 hours:  If on nasal continuous positive airway pressure, respiratory therapy assess nares and determine need for appliance change or resting period.   Outcome: Progressing     Problem: Chronic Conditions and Co-morbidities  Goal: Patient's chronic conditions and co-morbidity symptoms are monitored and maintained or improved  Outcome: Progressing  Flowsheets (Taken 9/6/2022 0845)  Care Plan - Patient's Chronic Conditions and Co-Morbidity Symptoms are Monitored and Maintained or Improved: Monitor and assess patient's chronic conditions and comorbid symptoms for stability, deterioration, or improvement     Problem: Pain  Goal: Verbalizes/displays adequate comfort level or baseline comfort level  Outcome: Progressing  Flowsheets (Taken 9/6/2022 0845)  Verbalizes/displays adequate comfort level or baseline comfort level: Encourage patient to monitor pain and request assistance     Problem: Safety - Adult  Goal: Free from fall injury  Outcome: Progressing     Problem: Nutrition Deficit:  Goal: Optimize nutritional status  Outcome: Progressing

## 2022-09-06 NOTE — ANESTHESIA PRE PROCEDURE
Department of Anesthesiology  Preprocedure Note       Name:  Emely Omer   Age:  78 y.o.  :  1940                                          MRN:  [id number]         Date:  2022      Surgeon: Jone Hi):  Rosario Fernandez MD    Procedure: Procedure(s):  LEFT CYSTOSCOPY URETERAL STENT EXCHANGE    Medications prior to admission:   Prior to Admission medications    Medication Sig Start Date End Date Taking? Authorizing Provider   atorvastatin (LIPITOR) 80 MG tablet take 1 tablet by mouth once daily for HIGH CHOLESTEROL 22   Historical Provider, MD   glimepiride (AMARYL) 4 MG tablet take 1 tablet by mouth every morning for DIABETES MELLTIUS 22   Historical Provider, MD   lisinopril (PRINIVIL;ZESTRIL) 5 MG tablet take 1 tablet by mouth every morning for high blood pressure 22   Historical Provider, MD   pregabalin (LYRICA) 50 MG capsule take 1 capsule by mouth twice a day for NERVE PAIN 22   Historical Provider, MD   torsemide (DEMADEX) 20 MG tablet Take 1 tablet by mouth in the morning.  22   Rafita Lacy MD   dextromethorphan-guaiFENesin (ROBITUSSIN-DM)  MG/5ML syrup Take 10 mLs by mouth every 4 hours as needed for Cough    Historical Provider, MD   Glucagon, rDNA, (GLUCAGON EMERGENCY) 1 MG KIT Inject as directed as needed    Historical Provider, MD   loperamide (IMODIUM A-D) 2 MG tablet Take 2 mg by mouth 4 times daily as needed for Diarrhea    Historical Provider, MD   insulin glargine (LANTUS) 100 UNIT/ML injection vial Inject 30 Units into the skin daily    Historical Provider, MD   Melatonin 10 MG TABS Take 1 tablet by mouth at bedtime    Historical Provider, MD   omeprazole (PRILOSEC) 20 MG delayed release capsule Take 40 mg by mouth daily    Historical Provider, MD   levothyroxine (SYNTHROID) 75 MCG tablet Take 75 mcg by mouth Daily    Historical Provider, MD   NONFORMULARY Liquid Protein bid    Historical Provider, MD   aspirin 81 MG chewable tablet Take 1 tablet by mouth daily 4/22/22   Alondra Rodriguez MD   amLODIPine (NORVASC) 10 MG tablet Take 10 mg by mouth daily    Historical Provider, MD   polyethylene glycol (GLYCOLAX) 17 g packet Take 17 g by mouth 2 times daily And PRN    Historical Provider, MD   ipratropium-albuterol (DUONEB) 0.5-2.5 (3) MG/3ML SOLN nebulizer solution Inhale 1 vial into the lungs 2 times daily    Historical Provider, MD   lidocaine (LIDODERM) 5 % Place 1 patch onto the skin daily Indications: L flank 12 hours on, 12 hours off. Historical Provider, MD   oxybutynin (DITROPAN-XL) 10 MG extended release tablet Take 10 mg by mouth daily    Historical Provider, MD   magnesium hydroxide (MILK OF MAGNESIA) 400 MG/5ML suspension Take 30 mLs by mouth daily as needed for Constipation    Historical Provider, MD   bisacodyl (DULCOLAX) 10 MG suppository Place 10 mg rectally daily    Historical Provider, MD   glucose (GLUTOSE) 40 % GEL Take 15 g by mouth as needed    Historical Provider, MD   carvedilol (COREG) 3.125 MG tablet Take 1 tablet by mouth 2 times daily (with meals) 4/14/22   GLEN Dolan MD   allopurinol (ZYLOPRIM) 100 MG tablet Take 100 mg by mouth daily    Historical Provider, MD   acetaminophen (TYLENOL) 325 MG tablet Take 2 tablets by mouth every 6 hours as needed for Pain 1/6/22   Aguila Lemons DO   docusate sodium (COLACE, DULCOLAX) 100 MG CAPS Take 100 mg by mouth 2 times daily as needed for Constipation. 12/14/14   Blanca Maloney MD       Current medications:    No current facility-administered medications for this visit. No current outpatient medications on file.      Facility-Administered Medications Ordered in Other Visits   Medication Dose Route Frequency Provider Last Rate Last Admin    carvedilol (COREG) tablet 6.25 mg  6.25 mg Oral BID  Jake Galindo MD        guaiFENesin-dextromethorphan (ROBITUSSIN DM) 100-10 MG/5ML syrup 5 mL  5 mL Oral Q4H PRN Jake Galindo MD        lidocaine 4 % external patch 1 patch  1 patch TransDERmal Daily Elby Schlatter, MD   1 patch at 09/06/22 1121    insulin lispro (HUMALOG) injection vial 0-16 Units  0-16 Units SubCUTAneous TID  Shereen Gutierrez MD   4 Units at 09/06/22 1250    insulin lispro (HUMALOG) injection vial 0-4 Units  0-4 Units SubCUTAneous 2 times per day Shereen Gutierrez MD        insulin glargine (LANTUS) injection vial 20 Units  20 Units SubCUTAneous Nightly M Jorge Lechuga MD        [facility]) 1,000 mg in sodium chloride 0.9 % 100 mL IVPB (mini-bag)  1,000 mg IntraVENous Q12H Nevin Hsieh MD 33.3 mL/hr at 09/06/22 1248 1,000 mg at 09/06/22 1248    hydrALAZINE (APRESOLINE) 10 mg in sodium chloride 0.9 % 50 mL ivpb  10 mg IntraVENous Q4H PRN Carlos Pablo MD        amLODIPine (NORVASC) tablet 10 mg  10 mg Oral Daily Carlos Pablo MD   10 mg at 09/06/22 0856    aspirin chewable tablet 81 mg  81 mg Oral Daily Bozena Sow MD   81 mg at 09/06/22 0855    atorvastatin (LIPITOR) tablet 80 mg  80 mg Oral Nightly Bozena Sow MD   80 mg at 09/05/22 2036    levothyroxine (SYNTHROID) tablet 75 mcg  75 mcg Oral Daily Bozena Sow MD   75 mcg at 09/06/22 0610    magnesium hydroxide (MILK OF MAGNESIA) 400 MG/5ML suspension 30 mL  30 mL Oral Daily PRN Bozena Sow MD        docusate sodium (COLACE) capsule 100 mg  100 mg Oral BID Bozena Sow MD   100 mg at 09/06/22 0855    bisacodyl (DULCOLAX) suppository 10 mg  10 mg Rectal Daily Bozena Sow MD   10 mg at 09/06/22 0855    melatonin tablet 10 mg  10 mg Oral Nightly Bozena Sow MD   10 mg at 09/05/22 2035    pantoprazole (PROTONIX) tablet 40 mg  40 mg Oral QAM AC Bozena Sow MD   40 mg at 09/06/22 0610    polyethylene glycol (GLYCOLAX) packet 17 g  17 g Oral BID Bozena Sow MD   17 g at 09/06/22 0855    pregabalin (LYRICA) capsule 50 mg  50 mg Oral BID Bozena Sow MD   50 mg at 09/06/22 0855    sodium chloride flush 0.9 % injection 5-40 mL  5-40 mL IntraVENous 2 times per day Marie Francois MD   5 mL at 09/05/22 2302    sodium chloride flush 0.9 % injection 5-40 mL  5-40 mL IntraVENous PRN Marie Francois MD        0.9 % sodium chloride infusion   IntraVENous PRN Marie Francois MD        enoxaparin (LOVENOX) injection 40 mg  40 mg SubCUTAneous QPM Marie Francois MD   40 mg at 09/05/22 1621    ondansetron (ZOFRAN-ODT) disintegrating tablet 4 mg  4 mg Oral Q8H PRN Marie Francois MD   4 mg at 08/30/22 8203    Or    ondansetron (ZOFRAN) injection 4 mg  4 mg IntraVENous Q6H PRN Marie Francois MD        aluminum & magnesium hydroxide-simethicone (MAALOX) 200-200-20 MG/5ML suspension 30 mL  30 mL Oral Q6H PRN Marie Francois MD        acetaminophen (TYLENOL) tablet 650 mg  650 mg Oral Q6H PRN Marie Francois MD   650 mg at 09/06/22 1440    Or    acetaminophen (TYLENOL) suppository 650 mg  650 mg Rectal Q6H PRN Marie Francois MD        glucose chewable tablet 16 g  4 tablet Oral PRN Marie Francois MD        dextrose bolus 10% 125 mL  125 mL IntraVENous PRN Marie Francois MD   Stopped at 09/01/22 1739    Or    dextrose bolus 10% 250 mL  250 mL IntraVENous PRN Marie Francois MD        glucagon (rDNA) injection 1 mg  1 mg SubCUTAneous PRN Marie Francois MD   1 mg at 09/01/22 3292    dextrose 10 % infusion   IntraVENous Continuous PRN Marie Francois MD        ipratropium-albuterol (DUONEB) nebulizer solution 3 mL  1 vial Inhalation BID PRN Marie Francois MD           Allergies:  No Known Allergies    Problem List:    Patient Active Problem List   Diagnosis Code    Gait disturbance R26.9    Generalized weakness R53.1    Diabetes mellitus ([facility] [address] E11.9    Osteoarthritis M19.90    Anemia of chronic disorder D63.8    Infected implanted bladder sphincter cuff T83.69XA    Escherichia coli urinary tract infection N39.0, B96.20    Hypertension I10    Sepsis ([facility] [address] A41.9    Acute encephalopathy G93.40    Type 2 diabetes mellitus with hypoglycemia without coma ([facility]) E11.649    UTI (urinary tract infection) due to urinary indwelling catheter ([facility]) T83.511A, N39.0    Hyperkalemia E87.5    Acute kidney failure (HCC) N17.9    Leg weakness, bilateral R29.898    Type 2 diabetes mellitus with neurological manifestations, uncontrolled ([facility]) E11.49, X00.88    Complicated UTI (urinary tract infection) N39.0    Essential hypertension I10    Severe muscle deconditioning R29.898    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 ([facility]) E11.69, E78.5    Frequent falls R29.6    Chronic kidney disease, stage 3a ([facility]) N18.31    Uncontrolled type 2 diabetes mellitus with peripheral neuropathy ([facility]) E11.42, E11.65    Prostate cancer ([facility] [address] C61    Suprapubic catheter ([facility] [address] Z93.59    Sepsis due to urinary tract infection ([facility] [address] A41.9, N39.0    Coagulase negative Staphylococcus bacteremia R78.81, B95.7    Chronic kidney disease, stage IV (severe) ([facility]) N18.4    Acute metabolic encephalopathy B64.99    SVT (supraventricular tachycardia) ([facility]) Y93.7    Metabolic encephalopathy L14.99    History of prostate cancer Z85.46    Cigarette nicotine dependence without complication E35.282    Altered mental status R41.82    Serratia marcescens infection A48.8    Hypoglycemia E16.2       Past Medical History:        Diagnosis Date    Acute urinary tract infection 3/15/2012    Ataxia 2010    Diabetes mellitus ([address] 2011    type 2, controlled    Fusion of spine of cervical region 10/2012    Gait disturbance 2011    History of prostate cancer 1996    adenocarcinoma    History of tobacco use 1959    Hyperlipidemia 2011    Osteoarthritis 2011       Past Surgical History:        Procedure Laterality Date    BLADDER SURGERY      BLADDER SURGERY Left 3/17/2022    CYSTOSCOPY LEFT STENT EXCHANGE performed by Tri Allen MD at [address] [facility] OTHER SURGICAL HISTORY  3/28/13    Cysto with removal of artificial sphinter and placement of suprapubic catheter.  PROSTATE SURGERY      PROSTATECTOMY  1996    infection       Social History:    Social History     Tobacco Use    Smoking status: Former     Packs/day: 0.50     Types: Cigarettes     Quit date: 1976     Years since quittin.2    Smokeless tobacco: Never   Substance Use Topics    Alcohol use: No     Comment: Quit in                                 Counseling given: Not Answered      Vital Signs (Current): There were no vitals filed for this visit.                                            BP Readings from Last 3 Encounters:   22 (!) 155/69   22 122/78   22 (!) 151/80       NPO Status:                                                                                 BMI:   Wt Readings from Last 3 Encounters:   22 211 lb 10.3 oz (96 kg)   22 197 lb 12.8 oz (89.7 kg)   22 196 lb 6.4 oz (89.1 kg)     There is no height or weight on file to calculate BMI.    CBC:   Lab Results   Component Value Date/Time    WBC 8.1 2022 04:05 AM    RBC 2.50 2022 04:05 AM    HGB 7.4 2022 04:05 AM    HCT 23.5 2022 04:05 AM    MCV 94.0 2022 04:05 AM    RDW 14.9 2022 04:05 AM     2022 04:05 AM       CMP:   Lab Results   Component Value Date/Time     2022 04:05 AM    K 4.4 2022 04:05 AM    K 3.9 2018 04:42 AM     2022 04:05 AM    CO2 25 2022 04:05 AM    BUN 20 2022 04:05 AM    CREATININE 1.8 2022 04:05 AM    GFRAA 44 2022 04:05 AM    LABGLOM 36 2022 04:05 AM    GLUCOSE 149 2022 04:05 AM    PROT 5.4 2022 12:26 AM    PROT 7.6 10/27/2012 03:30 AM    CALCIUM 8.0 2022 04:05 AM    BILITOT 0.2 2022 12:26 AM    ALKPHOS 93 2022 12:26 AM    AST 17 2022 12:26 AM    ALT 14 2022 12:26 AM       POC Tests:   Recent Labs     22  1205   POCGLU 248*       Coags:   Lab Results Component Value Date/Time    PROTIME 12.5 04/18/2022 09:51 AM    PROTIME 15.2 01/24/2011 06:48 PM    INR 0.97 04/18/2022 09:51 AM    APTT 33.6 04/18/2022 09:51 AM       HCG (If Applicable): No results found for: PREGTESTUR, PREGSERUM, HCG, HCGQUANT     ABGs: No results found for: PHART, PO2ART, SMQ1COV, AJO0OHP, BEART, S9QVDMIQ     Type & Screen (If Applicable):  No results found for: LABABO, LABRH    Drug/Infectious Status (If Applicable):  No results found for: HIV, HEPCAB    COVID-19 Screening (If Applicable):   Lab Results   Component Value Date/Time    COVID19 NOT DETECTED 08/28/2022 07:05 PM           Anesthesia Evaluation  Patient summary reviewed and Nursing notes reviewed  Airway: Mallampati: III  TM distance: >3 FB   Neck ROM: limited  Mouth opening: > = 3 FB   Dental:    (+) upper dentures and edentulous      Pulmonary:Negative Pulmonary ROS   (+) decreased breath sounds                           ROS comment: Former smoker   Cardiovascular:  Exercise tolerance: poor (<4 METS),   (+) hypertension:, hyperlipidemia      ECG reviewed  Rhythm: regular  Rate: normal  Echocardiogram reviewed               ROS comment:  Left ventricular systolic function is normal.   Ejection fraction is visually estimated at 55%. Moderate left ventricular hypertrophy. Grade I diastolic dysfunction. Sclerotic, but non-stenotic aortic valve. No evidence of any pericardial effusion.       Signature      ------------------------------------------------------------------   Electronically signed by Monica Delgado MD   (IntSinus rhythm with 1st degree AV block   Otherwise normal ECG   When compared with ECG of 03-SEP-2022 03:12,   No significant change was found   Confirmed by Jamel Elizalde ([id number]) on 9/4/2022 2:54:52 PM erpreting physician) on 03/29/2022 at 12:28 PM   ------------------------------------------------------------------          Neuro/Psych:   (+) neuromuscular disease (diabetic neuropathy):, GI/Hepatic/Renal:   (+) GERD:, renal disease: CRI,           Endo/Other:    (+) DiabetesType II DM, poorly controlled, using insulin, hypothyroidism, blood dyscrasia: anemia, arthritis: OA., electrolyte abnormalities, malignancy/cancer (prostate). Abdominal:   (+) obese,           Vascular: negative vascular ROS. Other Findings:             Anesthesia Plan      MAC     ASA 3     (Chart review only 9/6/22)  Induction: intravenous. MIPS: Postoperative opioids intended. Anesthetic plan and risks discussed with patient. Plan discussed with CRNA.     Attending anesthesiologist reviewed and agrees with Pre Eval content                VIVIANA Cesar - CRNA   9/6/2022

## 2022-09-06 NOTE — PROGRESS NOTES
V2.0  [facility] [place] [facility] Hospitalist Progress Note      Name:  Aftab Grullon /Age/Sex: 1940  (78 y.o. male)   MRN & CSN:  [id number] & [id number] Encounter Date/Time: 2022 1:46 PM EDT    Location:  [address] [place] [address] PCP: Ever Lopez MD       Hospital Day: 10    Assessment and Plan:   Aftab Grullon is a 78 y.o. male with pmh of prostate cancer, diabetes mellitus, hypertension who admitted with sciatic found out with Complicated UTI (urinary tract infection)    # ESBL bacteremia: Secondary to UTI  # Complicated UTI  - Urine culture on  growing E. coli resistant to Cipro and cefepime  - Urine culture on  no significant growth  - CT abdomen/pelvis showing left ureteral stent in place, mild bilateral urinary tract dilatation with new right perinephric and periureteral stranding raises concern for UTI  - Ultrasound abdomen unremarkable  - Blood cultures  no growth  - Continue IV meropenem started on   - ID consult pending  - PT/OT evaluation    # Cough: Patient complained of cough with some clear sputum  - Low-grade fever, not tachycardic or tachypneic, SPO2 96% on room air, WBC 8.1  - On examination diffuse crackles but no wheezing  - Trial of 40 mg of Lasix and DC IV fluids  - Obtain chest x-ray  - Patient is on meropenem already  - Obtain CBC, CRP, Pro-Vinh a.m.  - Use incentive spirometry    # History of prostate cancer status post prostatectomy      # Hypertension: Blood pressure stable continue on current medication     # Hypothyroidism continue levothyroxine     # CKD  - Avoid nephrotoxic      # Type 2 diabetes: Continue SSI, hypoglycemic protocol diabetic diet      Diet ADULT DIET; Dysphagia - Soft and Bite Sized; 5 carb choices (75 gm/meal); Low Fat/Low Chol/High Fiber/JORGE  ADULT ORAL NUTRITION SUPPLEMENT; Lunch, Dinner; Low Calorie/High Protein Oral Supplement  ADULT ORAL NUTRITION SUPPLEMENT; Breakfast; Other Oral Supplement;  Kefir  Diet NPO   DVT Prophylaxis [x] Lovenox, []  Heparin, [] SCDs, [] Ambulation,  [] Eliquis, [] Xarelto  [] Coumadin   Code Status Full Code   Disposition From: Home  Expected Disposition: Home versus SNF  Estimated Date of Discharge: TBD  Patient requires continued admission due to IV antibiotics   Surrogate Decision Maker/ POA Son     Subjective:     Chief Complaint: Fatigue (General weakness started this am. Family called because pt had changed from baseline. Currently being tx for UTI. Pt is slightly confused, doesn't know the year. He has been N/V. )     Patient seen and examined at bedside, no overnight complaints, denies any fever, chills, urinary symptoms         Review of Systems:    Review of Systems    Constitutional: No fever no chills  HEENT: No headache no dizziness  Cardiovascular: No chest pain no palpitations  Respiratory: No cough no shortness of breath  Abdomen: No diarrhea, no nausea, no vomiting, no abdominal pain  Musculoskeletal: No swelling  Neuro: No numbness or tingling  Skin: No rash      Objective: Intake/Output Summary (Last 24 hours) at 9/6/2022 1345  Last data filed at 9/6/2022 1130  Gross per 24 hour   Intake 250 ml   Output 1300 ml   Net -1050 ml          Vitals:   Vitals:    09/06/22 0845   BP: (!) 146/69   Pulse: 82   Resp: 16   Temp: 100.2 °F (37.9 °C)   SpO2: 96%       Physical Exam:     General: Afebrile no distress  Eyes: EOMI  ENT: neck supple  Cardiovascular: S1-S2 normal no murmur  Respiratory: Bilateral diffuse crackles but no wheezing  Gastrointestinal: Soft, non tender  Genitourinary: no suprapubic tenderness  Musculoskeletal: No edema  Skin: warm, dry  Neuro: Alert. Psych: Mood appropriate.      Medications:   Medications:    carvedilol  6.25 mg Oral BID WC    lidocaine  1 patch TransDERmal Daily    insulin lispro  0-16 Units SubCUTAneous TID WC    insulin lispro  0-4 Units SubCUTAneous 2 times per day    insulin glargine  20 Units SubCUTAneous Nightly    meropenem  1,000 mg IntraVENous Q12H    amLODIPine  10 mg Oral Daily aspirin  81 mg Oral Daily    atorvastatin  80 mg Oral Nightly    levothyroxine  75 mcg Oral Daily    docusate sodium  100 mg Oral BID    bisacodyl  10 mg Rectal Daily    melatonin  10 mg Oral Nightly    pantoprazole  40 mg Oral QAM AC    polyethylene glycol  17 g Oral BID    pregabalin  50 mg Oral BID    sodium chloride flush  5-40 mL IntraVENous 2 times per day    enoxaparin  40 mg SubCUTAneous QPM      Infusions:    sodium chloride      dextrose      sodium chloride 50 mL/hr at 09/05/22 0048     PRN Meds: guaiFENesin-dextromethorphan, 5 mL, Q4H PRN  HYDROcodone-acetaminophen, 1 tablet, Q6H PRN  hydrALAZINE (APRESOLINE) ivpb, 10 mg, Q4H PRN  magnesium hydroxide, 30 mL, Daily PRN  sodium chloride flush, 5-40 mL, PRN  sodium chloride, , PRN  ondansetron, 4 mg, Q8H PRN   Or  ondansetron, 4 mg, Q6H PRN  aluminum & magnesium hydroxide-simethicone, 30 mL, Q6H PRN  acetaminophen, 650 mg, Q6H PRN   Or  acetaminophen, 650 mg, Q6H PRN  glucose, 4 tablet, PRN  dextrose bolus, 125 mL, PRN   Or  dextrose bolus, 250 mL, PRN  glucagon (rDNA), 1 mg, PRN  dextrose, , Continuous PRN  ipratropium-albuterol, 1 vial, BID PRN      Labs      Recent Results (from the past 24 hour(s))   POCT Glucose    Collection Time: 09/05/22  5:11 PM   Result Value Ref Range    POC Glucose 193 (H) 70 - 99 MG/DL   POCT Glucose    Collection Time: 09/05/22  9:27 PM   Result Value Ref Range    POC Glucose 195 (H) 70 - 99 MG/DL   POCT Glucose    Collection Time: 09/06/22  2:52 AM   Result Value Ref Range    POC Glucose 158 (H) 70 - 99 MG/DL   CBC with Auto Differential    Collection Time: 09/06/22  4:05 AM   Result Value Ref Range    WBC 8.1 4.0 - 10.5 K/CU MM    RBC 2.50 (L) 4.6 - 6.2 M/CU MM    Hemoglobin 7.4 (L) 13.5 - 18.0 GM/DL    Hematocrit 23.5 (L) 42 - 52 %    MCV 94.0 78 - 100 FL    MCH 29.6 27 - 31 PG    MCHC 31.5 (L) 32.0 - 36.0 %    RDW 14.9 11.7 - 14.9 %    Platelets 539 298 - 266 K/CU MM    MPV 10.1 7.5 - 11.1 FL    Differential Type AUTOMATED DIFFERENTIAL     Segs Relative 67.6 (H) 36 - 66 %    Lymphocytes % 17.9 (L) 24 - 44 %    Monocytes % 9.2 (H) 0 - 4 %    Eosinophils % 4.2 (H) 0 - 3 %    Basophils % 0.2 0 - 1 %    Segs Absolute 5.5 K/CU MM    Lymphocytes Absolute 1.5 K/CU MM    Monocytes Absolute 0.8 K/CU MM    Eosinophils Absolute 0.3 K/CU MM    Basophils Absolute 0.0 K/CU MM    Nucleated RBC % 0.0 %    Total Nucleated RBC 0.0 K/CU MM    Total Immature Neutrophil 0.07 K/CU MM    Immature Neutrophil % 0.9 (H) 0 - 0.43 %   Basic Metabolic Panel w/ Reflex to MG    Collection Time: 09/06/22  4:05 AM   Result Value Ref Range    Sodium 143 135 - 145 MMOL/L    Potassium 4.4 3.5 - 5.1 MMOL/L    Chloride 110 99 - 110 mMol/L    CO2 25 21 - 32 MMOL/L    Anion Gap 8 4 - 16    BUN 20 6 - 23 MG/DL    Creatinine 1.8 (H) 0.9 - 1.3 MG/DL    Glucose 149 (H) 70 - 99 MG/DL    Calcium 8.0 (L) 8.3 - 10.6 MG/DL    GFR Non- 36 (L) >60 mL/min/1.73m2    GFR  44 (L) >60 mL/min/1.73m2   POCT Glucose    Collection Time: 09/06/22  8:04 AM   Result Value Ref Range    POC Glucose 159 (H) 70 - 99 MG/DL   POCT Glucose    Collection Time: 09/06/22 12:05 PM   Result Value Ref Range    POC Glucose 248 (H) 70 - 99 MG/DL        Imaging/Diagnostics Last 24 Hours   No results found.     Electronically signed by Evita Raman MD on 9/6/2022 at 1:45 PM

## 2022-09-06 NOTE — CONSULTS
Infectious Disease Consult Note  2022   Patient Name: Janina Ruiz : 1940     Assessment  Sepsis secondary to ESBL E. coli pyelonephritis associated with suprapubic cystostomy. History of prostate cancer status post RPP  CKD stage IIIB  T2DM  Diabetic neuropathy    Plan  Therapeutic: On meropenem 1 g q 12 hours , treat for 14 days (end-date 2022). D/w Dr. Yosi Addison, midline or PICC line is fine. Cephalic vein is preferred (preserve deeper basilic vein). After discharge the following should be done:  Weekly labs drawn on Monday during the course of treatment  CBC with differential, CMP, ESR, CRP,   Cathflo for blocked vascular access as needed  Fax results to Attn: [place] Staff  # [phone]  See in clinic within one week after discharge  Disposition: TDB  Diagnostic:  F/u:  Other: Thank you for allowing me to consult in the care of this patient.  ------------------------  REASON FOR CONSULT: Infective syndrome   Requested by: Dr. Aung Carter. Malathi Del Real is a 78 y.o. male past medical history of type 2 diabetes mellitus, with diabetic neuropathy, chronic kidney disease stage IV, prostate cancer status post radical perineal prostatectomy (), chronic suprapubic catheter who was admitted 2022 for further evaluation and management of fatigue for 2 days prior to admission. He had endorsed nausea, vomiting, fever and chills. .His family was concerned about recurrent UTI since he had presented similarly in 2022. He was treated for Serratia marcescens UTI in 2022. He was treated empirically on this admission with ceftriaxone and this was changed to cefepime. CT of the abdomen pelvis was done that showed a left ureteral stent in place, mild bilateral urinary tract dilatation with new right perinephric and periureteral stranding with concerns for UTI. Blood cultures drawn on 2022 was positive for ESBL E. coli.   Urine culture showed greater than 50,000 CFU per mL. Antibiotics were changed to meropenem since 9/1/2022. He was evaluated by the urology service who made a clinical assessment of chronic left hydronephrosis, chronic urinary retention. Allergy continues to see the patient and the plan for left ureteral stent exchange after treatment with IV antibiotics for 7 days. In addition they recommended irrigation of the suprapubic cystostomy with 100 mL of saline daily. ? Infectious diseases service was consulted to evaluate the pt, and recommend further investigative and therapeutic measures.   Review and summary of old records:  ROS: Other systems reviewed Including eyes, ENT, respiratory, cardiovascular, GI, , dermatologic, neurologic, psych, hem/lymphatic, musculoskeletal and endocrine were negative other than what is mentioned above  Patient Active Problem List    Diagnosis Date Noted    Acute encephalopathy 05/02/2016    Type 2 diabetes mellitus with hypoglycemia without coma ([address] 05/02/2016    Generalized weakness 03/15/2012    Hypoglycemia 04/22/2022    Altered mental status     Serratia marcescens infection     Anemia of chronic disorder 03/16/2012    Gait disturbance 03/15/2012    Hypertension 03/23/2013    History of prostate cancer     Cigarette nicotine dependence without complication     Metabolic encephalopathy 17/44/2952    Coagulase negative Staphylococcus bacteremia 03/27/2022    Chronic kidney disease, stage IV (severe) ([address] 38/54/2842    Acute metabolic encephalopathy 05/72/3444    SVT (supraventricular tachycardia) ([address] 03/27/2022    Sepsis due to urinary tract infection ([address] 03/17/2022    Chronic kidney disease, stage 3a ([address]     Uncontrolled type 2 diabetes mellitus with peripheral neuropathy (HCC)     Prostate cancer ([address]     Suprapubic catheter ([address]     Essential hypertension 11/17/2021    Severe muscle deconditioning 11/17/2021    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 ([address] 11/17/2021    Frequent falls     Complicated UTI (urinary tract infection) 2020    Leg weakness, bilateral 2016    Type 2 diabetes mellitus with neurological manifestations, uncontrolled ([address] 2016    Hyperkalemia 2016    Acute kidney failure ([address] 2016    UTI (urinary tract infection) due to urinary indwelling catheter ([address] 2016    Sepsis ([address] 2014    Infected implanted bladder sphincter cuff 2013    Escherichia coli urinary tract infection 2013    Diabetes mellitus ([address]     Osteoarthritis      Past Medical History:   Diagnosis Date    Acute urinary tract infection 3/15/2012    Ataxia 2010    Diabetes mellitus ([address] 2011    type 2, controlled    Fusion of spine of cervical region 10/2012    Gait disturbance     History of prostate cancer     adenocarcinoma    History of tobacco use     Hyperlipidemia     Osteoarthritis       Past Surgical History:   Procedure Laterality Date    BLADDER SURGERY      BLADDER SURGERY Left 3/17/2022    CYSTOSCOPY LEFT STENT EXCHANGE performed by Michael Prince MD at [facility]  3/28/13    Cysto with removal of artificial sphinter and placement of suprapubic catheter. PROSTATE SURGERY      PROSTATECTOMY      infection      Family History   Problem Relation Age of Onset    Cancer Mother     Diabetes Father     Heart Disease Father     High Blood Pressure Father     Diabetes Sister     High Blood Pressure Sister     Cancer Brother         prostate, breast    Diabetes Brother     Cancer Maternal Uncle     Diabetes Maternal Grandmother     Stroke Maternal Grandfather       Infectious disease related family history - not contibutory.    SOCIAL HISTORY  Social History     Tobacco Use    Smoking status: Former     Packs/day: 0.50     Types: Cigarettes     Quit date: 1976     Years since quittin.2    Smokeless tobacco: Never   Substance Use Topics    Alcohol use: No     Comment: Quit in 1993      Ancestry: Black     ? ALLERGIES  No Known Allergies   MEDICATIONS  Reviewed and are per the chart/EMR. IMMUNIZATION HISTORY  Immunization History   Administered Date(s) Administered    COVID-19, MODERNA BLUE border, Primary or Immunocompromised, (age 12y+), IM, 100 mcg/0.5mL 01/22/2021, 02/19/2021, 02/08/2022     ? Antibiotics:   Meropenem  ?  -------------------------------------------------------------------------------------------------------------------    Vital Signs:  Vitals:    09/06/22 0845   BP: (!) 146/69   Pulse: 82   Resp: 16   Temp: 100.2 °F (37.9 °C)   SpO2: 96%         Exam:    VS: noted; wt 96 kg   Gen: alert and oriented X3, no distress  Skin: no stigmata of endocarditis  Wounds: C/D/I  HEMT: AT/NC Oropharynx pink, moist, and without lesions or exudates; dentition in good state of repair  Eyes: PERRLA, EOMI, conjunctiva pink, sclera anicteric. Neck: Supple. Trachea midline. No LAD. Chest: no distress and CTA. Good air movement. Heart: RRR and no MRG. Abd: suprapubic catheter, soft, non-distended, no tenderness, no hepatomegaly. Normoactive bowel sounds. Ext: no clubbing, cyanosis, or edema  Catheter Site: without erythema or tenderness  LDA: CVC:  Neuro: Mental status intact. CN 2-12 intact and no focal sensory or motor deficits    ? Diagnostic Studies: reviewed  ? ? I have examined this patient and available medical records on this date and have made the above observations, conclusions and recommendations.   Electronically signed by: Electronically signed by Marija Damon MD on 9/6/2022 at 10:41 AM

## 2022-09-06 NOTE — PROGRESS NOTES
[facility]   Progress Note  [facility] 0 1 2      Date: 2022   Patient: Jagdeep Aviles   : 1940   DOA: 2022   MRN: [id number]   ROOM#: [id number]     Admit Date: 2022     Collaborating Urologist on Call at time of admission: Dr. Vito Sicard  CC: Fatigue  Reason for Consult: Hematuria with acute blood loss anemia    Subjective:     Pain: mild, no nausea and no vomiting,   Bowel Movement/Flatus:   Yes  Voiding:  SPT in place, urine clear yellow w some debris noted in tubing     Pt resting in bed, states he is feeling OK but very tired. Objective:    Vitals:    BP (!) 146/69   Pulse 82   Temp 100.2 °F (37.9 °C) (Oral)   Resp 16   Ht 5' 8\" (1.727 m)   Wt 211 lb 10.3 oz (96 kg)   SpO2 96%   BMI 32.18 kg/m²    Temp  Av.3 °F (37.4 °C)  Min: 98.6 °F (37 °C)  Max: 100.2 °F (37.9 °C)     Intake/Output Summary (Last 24 hours) at 2022 1052  Last data filed at 2022 0919  Gross per 24 hour   Intake 250 ml   Output 700 ml   Net -450 ml     Physical Exam:  General appearance: alert, appears stated age, cooperative, fatigued, no distress, mildly obese and slowed mentation  Head: Normocephalic, without obvious abnormality, atraumatic  Back: No CVA tenderness  Abdomen: Soft, non-tender, non-distended. 16fr SPt in place, urine clear yellow with some debris noted in tubing.     Labs:   WBC:    Lab Results   Component Value Date/Time    WBC 8.1 2022 04:05 AM      Hemoglobin/Hematocrit:    Lab Results   Component Value Date/Time    HGB 7.4 2022 04:05 AM    HCT 23.5 2022 04:05 AM      BMP:   Lab Results   Component Value Date/Time     2022 04:05 AM    K 4.4 2022 04:05 AM    K 3.9 2018 04:42 AM     2022 04:05 AM    CO2 25 2022 04:05 AM    BUN 20 2022 04:05 AM    LABALBU 3.0 2022 12:26 AM    CREATININE 1.8 2022 04:05 AM    CALCIUM 8.0 2022 04:05 AM    GFRAA 44 2022 04:05 AM    LABGLOM 36 09/06/2022 04:05 AM      PT/INR:    Lab Results   Component Value Date/Time    PROTIME 12.5 04/18/2022 09:51 AM    PROTIME 15.2 01/24/2011 06:48 PM    INR 0.97 04/18/2022 09:51 AM      PTT:    Lab Results   Component Value Date/Time    APTT 33.6 04/18/2022 09:51 AM     Blood Culture: 4/4 bottles +ESBL E.coli  Urine Culture: >50K CFU/ml mixed growth    Imaging:   CT ABDOMEN PELVIS WO CONTRAST Additional Contrast? None    Result Date: 9/1/2022  EXAMINATION: CT OF THE ABDOMEN AND PELVIS WITHOUT CONTRAST 9/1/2022 3:06 pm TECHNIQUE: CT of the abdomen and pelvis was performed without the administration of intravenous contrast. Multiplanar reformatted images are provided for review. Automated exposure control, iterative reconstruction, and/or weight based adjustment of the mA/kV was utilized to reduce the radiation dose to as low as reasonably achievable. COMPARISON: 04/18/2022. HISTORY: ORDERING SYSTEM PROVIDED HISTORY: abdominal pain TECHNOLOGIST PROVIDED HISTORY: Reason for exam:->abdominal pain Additional Contrast?->None Reason for Exam: abdominal pain, POSSIBLE UTI FINDINGS: Lower Chest: The visualized lung bases demonstrate subsegmental atelectasis/scarring. Trace pericardial fluid. Questionable trace bilateral pleural fluid. Atherosclerosis in the visualized thoracic aorta. Coronary artery calcifications. Small hiatal hernia. Liver: Normal. Gallbladder and Bile Ducts: Normal. Spleen: Normal. Adrenal Glands: Normal. Pancreas: Normal. Genitourinary: Left ureteral stent with minimal dilatation of the left urinary tract. Mild dilatation of the right urinary tract. Mild dilatation of the right urinary tract with increased right perinephric and periureteral stranding. No definite urinary stones. Suprapubic catheter in the decompressed urinary bladder. Bowel: Normal caliber bowel. Normal appendix. No significant diverticular disease. Vasculature: Atherosclerosis. Normal caliber abdominal aorta.  Bones and Soft Tissues: Fat containing right inguinal hernia. Degenerative changes in the visualized spine and pelvis. Retroperitoneum/Mesentery: No intraperitoneal free air, ascites or fluid collection. No lymphadenopathy in the abdomen or pelvis. Stranding/fluid throughout the retroperitoneum. 1.  Left ureteral stent in place. Mild bilateral urinary tract dilatation with new right perinephric and periureteral stranding raises concern for urinary tract infection. No urinary stones. 2.  Suprapubic catheter in the decompressed urinary bladder. 3.  Ill-defined stranding/fluid throughout the retroperitoneum may relate to vascular congestion or reactive changes. No intraperitoneal free air or abscess. XR CHEST PORTABLE    Result Date: 8/28/2022  EXAMINATION: ONE XRAY VIEW OF THE CHEST 8/28/2022 3:37 pm COMPARISON: 04/18/2022 HISTORY: ORDERING SYSTEM PROVIDED HISTORY: emergency TECHNOLOGIST PROVIDED HISTORY: Reason for exam:->emergency Reason for Exam: fatigue, emergency, fever FINDINGS: Increased interstitial opacities seen throughout both lungs. A focal infiltrate is not identified. The cardial pericardial silhouette is unremarkable. No pneumothorax is seen. No free air. No acute bony abnormality. Increased interstitial opacity. While much or all of this may be chronic, please correlate with any clinical evidence of acute interstitial edema. US ABDOMEN LIMITED Specify organ? LIVER, GALLBLADDER    Result Date: 9/3/2022  EXAMINATION: RIGHT UPPER QUADRANT ULTRASOUND 9/3/2022 4:43 am COMPARISON: CT 09/01/2022, ultrasound 05/06/2020 HISTORY: ORDERING SYSTEM PROVIDED HISTORY: abdominal pain TECHNOLOGIST PROVIDED HISTORY: Reason for exam:->abdominal pain Specify organ?->LIVER Specify organ?->GALLBLADDER FINDINGS: LIVER:  The liver demonstrates normal echogenicity without evidence of intrahepatic biliary ductal dilatation.  BILIARY SYSTEM:  Gallbladder is unremarkable without evidence of pericholecystic fluid, wall thickening or stones. Negative sonographic Lima's sign. Common bile duct is within normal limits measuring 3 mm. RIGHT KIDNEY: The right kidney is grossly unremarkable without evidence of hydronephrosis. PANCREAS:  Visualized portions of the pancreas are unremarkable. OTHER: No evidence of right upper quadrant ascites. Unremarkable right upper quadrant ultrasound. Assessment & Plan:      Eliana Banks is a 78 y.o. male admitted 9/91/5054 for metabolic encephalopathy. 1) Chronic Urinary Retention: managed with suprapubic catheter and exchanged monthly, last exchanged upon admission 8/28/22. Recommend SPT exchanges q3 weeks. 2) Chronic Left Hydronephrosis: s/p cystoscopy, left ureteral stent exchange 6/29/22. S/p cystoscopy, left ureteral stent placement 7/4/21              CT a/p 9/1/22: Left ureteral stent in place. Mild bilateral urinary tract dilatation with new right perinephric and periureteral stranding raises concern for urinary tract infection. No urinary stones. Suprapubic catheter in the decompressed urinary bladder. Recommend left ureteral stent exchanges q3 months. Pt's family would like to have stent changed while in house. Plan for cystoscopy, left ureteral stent exchange tomorrow afternoon with Dr. Brenda Pinzon. Will make NPO at [place]. 3) Sepsis w Complicated UTI: urine cx 8/29/22 >50K CFU/ml mixed growth, likely contaminated. Blood cultures 4/4 +ESBL E.coli              WBC 6.0, afebrile              On IV Merrem   Recommend irrigate SP w saline 100 ml daily. 4) H/o Prostate Cancer: S/p RPP with Dr. Jayy Patton in 54. PSA 0.93 2/2021. On Eligard q6 months     Will follow. Patient seen and examined, chart reviewed.      Electronically signed by Alfred Rodriguez PA-C on 9/6/2022 at 10:52 AM

## 2022-09-06 NOTE — PROGRESS NOTES
Physical Therapy    Physical Therapy Treatment Note  Name: Jimmy Campa MRN: [id number] :   1940   Date:  2022   Admission Date: 2022 Room:  [address] [place], [place] [address]   Restrictions/Precautions:          fall risk  Communication with other providers:  nurse states pt ok to treat. Nurse places pain patch just prior to tx session. Subjective:  Patient states:  agreeable to PT  Pain:   Location, Type, Intensity (0/10 to 10/10):  does not numerically rate, but states he has aching areas on body, shoulder in particular      Objective:    Observation:  semi-fowlers position upon entry    Treatment, including education/measures:  Transfers with line management of catheter  Rolling: modA  Supine to sit :modA  Sit to supine :mod-maxA  Scooting :maxA  Sit to stand : x1 attempt but pt unable. Pt displays limited muscle activation in BLE's when attempting to stand. Pt lacking mobility in cervical and thoracic spine and BUE/BLE's limits ability to perform sit>stand. Pt upper traps very ttp as well. Increased time to complete all tasks due to this. Sitting Balance training EOB x ~15min. Pt displays forward posture  BLE ex's AROM/AAROM into LAQ, marches and DF/PF, BUE's to increase mobility /c PTA assist as tolerated. Mild STM to L UT to improve mobility to increase UE use in prep for transfer training. Vc's for proper form for ex's. Safety  Patient left safely in the bed, with call light/phone in reach with alarm applied.          Assessment / Impression:    Pt tolerates longer time sitting upright EOB, but pain and lacking mobility limit progress  Patient's tolerance of treatment:  fair   Adverse Reaction: na  Significant change in status and impact:  na  Barriers to improvement:  weakness and lacking mobility   Plan for Next Session:    Cont POC /c focus on sitting balance and transfer training as tolerated  Time in:  1120  Time out:  1149  Timed treatment minutes:  29  Total treatment time:  29    Previously filed items:  Social/Functional History  Lives With: Son  Home Equipment: Rollator        Short Term Goals  Time Frame for Short term goals: 2 weeks  Short term goal 1: Pt will perform sit><supine maxA  Short term goal 2: Pt will perform static sitting x 5 minutes SBA with BUE support  Short term goal 3: Pt will transfer between surfaces maxA    Electronically signed by:    Андрей Dunn PTA  9/6/2022, 11:52 AM

## 2022-09-06 NOTE — PROGRESS NOTES
Wound care consulted for \"Dr. Donavan Wick requested a dolphin matress. \" No LDA's and Judd Reina. Called nurse to clarify with no answer. Message left with nurse manager to place order for rental mattress if needed for prevention. Wound care consult not needed.

## 2022-09-07 ENCOUNTER — ANESTHESIA (OUTPATIENT)
Dept: OPERATING ROOM | Age: 82
DRG: 853 | End: 2022-09-07
Payer: MEDICARE

## 2022-09-07 ENCOUNTER — APPOINTMENT (OUTPATIENT)
Dept: GENERAL RADIOLOGY | Age: 82
DRG: 853 | End: 2022-09-07
Payer: MEDICARE

## 2022-09-07 LAB
ALBUMIN SERPL-MCNC: 2.7 GM/DL (ref 3.4–5)
ALP BLD-CCNC: 89 IU/L (ref 40–128)
ALT SERPL-CCNC: 12 U/L (ref 10–40)
ANION GAP SERPL CALCULATED.3IONS-SCNC: 10 MMOL/L (ref 4–16)
AST SERPL-CCNC: 15 IU/L (ref 15–37)
BASOPHILS ABSOLUTE: 0 K/CU MM
BASOPHILS RELATIVE PERCENT: 0.4 % (ref 0–1)
BILIRUB SERPL-MCNC: 0.3 MG/DL (ref 0–1)
BUN BLDV-MCNC: 24 MG/DL (ref 6–23)
CALCIUM SERPL-MCNC: 8.4 MG/DL (ref 8.3–10.6)
CHLORIDE BLD-SCNC: 104 MMOL/L (ref 99–110)
CO2: 25 MMOL/L (ref 21–32)
CREAT SERPL-MCNC: 1.8 MG/DL (ref 0.9–1.3)
DIFFERENTIAL TYPE: ABNORMAL
EOSINOPHILS ABSOLUTE: 0.4 K/CU MM
EOSINOPHILS RELATIVE PERCENT: 3.6 % (ref 0–3)
FERRITIN: 508 NG/ML (ref 30–400)
FOLATE: 15.1 NG/ML (ref 3.1–17.5)
GFR AFRICAN AMERICAN: 44 ML/MIN/1.73M2
GFR NON-AFRICAN AMERICAN: 36 ML/MIN/1.73M2
GLUCOSE BLD-MCNC: 146 MG/DL (ref 70–99)
GLUCOSE BLD-MCNC: 167 MG/DL (ref 70–99)
GLUCOSE BLD-MCNC: 169 MG/DL (ref 70–99)
GLUCOSE BLD-MCNC: 175 MG/DL (ref 70–99)
GLUCOSE BLD-MCNC: 197 MG/DL (ref 70–99)
GLUCOSE BLD-MCNC: 214 MG/DL (ref 70–99)
HCT VFR BLD CALC: 22.4 % (ref 42–52)
HEMOGLOBIN: 7 GM/DL (ref 13.5–18)
HIGH SENSITIVE C-REACTIVE PROTEIN: 186.3 MG/L (ref 0–5)
IMMATURE NEUTROPHIL %: 0.6 % (ref 0–0.43)
IRON: 12 UG/DL (ref 59–158)
LYMPHOCYTES ABSOLUTE: 1.7 K/CU MM
LYMPHOCYTES RELATIVE PERCENT: 16.2 % (ref 24–44)
MCH RBC QN AUTO: 29.9 PG (ref 27–31)
MCHC RBC AUTO-ENTMCNC: 31.3 % (ref 32–36)
MCV RBC AUTO: 95.7 FL (ref 78–100)
MONOCYTES ABSOLUTE: 0.9 K/CU MM
MONOCYTES RELATIVE PERCENT: 8.1 % (ref 0–4)
NUCLEATED RBC %: 0 %
PCT TRANSFERRIN: 7 % (ref 10–44)
PDW BLD-RTO: 14.9 % (ref 11.7–14.9)
PLATELET # BLD: 336 K/CU MM (ref 140–440)
PMV BLD AUTO: 10.2 FL (ref 7.5–11.1)
POTASSIUM SERPL-SCNC: 4.3 MMOL/L (ref 3.5–5.1)
PRO-BNP: 1618 PG/ML
PROCALCITONIN: 3.28
RBC # BLD: 2.34 M/CU MM (ref 4.6–6.2)
RETICULOCYTE COUNT PCT: 1 % (ref 0.2–2.2)
SEGMENTED NEUTROPHILS ABSOLUTE COUNT: 7.4 K/CU MM
SEGMENTED NEUTROPHILS RELATIVE PERCENT: 71.1 % (ref 36–66)
SODIUM BLD-SCNC: 139 MMOL/L (ref 135–145)
TOTAL IMMATURE NEUTOROPHIL: 0.06 K/CU MM
TOTAL IRON BINDING CAPACITY: 171 UG/DL (ref 250–450)
TOTAL NUCLEATED RBC: 0 K/CU MM
TOTAL PROTEIN: 5.4 GM/DL (ref 6.4–8.2)
UNSATURATED IRON BINDING CAPACITY: 159 UG/DL (ref 110–370)
VITAMIN B-12: 389.9 PG/ML (ref 211–911)
WBC # BLD: 10.4 K/CU MM (ref 4–10.5)

## 2022-09-07 PROCEDURE — 0TP98DZ REMOVAL OF INTRALUMINAL DEVICE FROM URETER, VIA NATURAL OR ARTIFICIAL OPENING ENDOSCOPIC: ICD-10-PCS | Performed by: SPECIALIST

## 2022-09-07 PROCEDURE — 2709999900 HC NON-CHARGEABLE SUPPLY: Performed by: SPECIALIST

## 2022-09-07 PROCEDURE — 0T2BX0Z CHANGE DRAINAGE DEVICE IN BLADDER, EXTERNAL APPROACH: ICD-10-PCS | Performed by: SPECIALIST

## 2022-09-07 PROCEDURE — 6360000002 HC RX W HCPCS

## 2022-09-07 PROCEDURE — C1769 GUIDE WIRE: HCPCS | Performed by: SPECIALIST

## 2022-09-07 PROCEDURE — 80053 COMPREHEN METABOLIC PANEL: CPT

## 2022-09-07 PROCEDURE — 94150 VITAL CAPACITY TEST: CPT

## 2022-09-07 PROCEDURE — 3600000003 HC SURGERY LEVEL 3 BASE: Performed by: SPECIALIST

## 2022-09-07 PROCEDURE — 1200000000 HC SEMI PRIVATE

## 2022-09-07 PROCEDURE — 94761 N-INVAS EAR/PLS OXIMETRY MLT: CPT

## 2022-09-07 PROCEDURE — 82728 ASSAY OF FERRITIN: CPT

## 2022-09-07 PROCEDURE — C2617 STENT, NON-COR, TEM W/O DEL: HCPCS | Performed by: SPECIALIST

## 2022-09-07 PROCEDURE — 6360000002 HC RX W HCPCS: Performed by: NURSE ANESTHETIST, CERTIFIED REGISTERED

## 2022-09-07 PROCEDURE — 2580000003 HC RX 258: Performed by: SPECIALIST

## 2022-09-07 PROCEDURE — 36415 COLL VENOUS BLD VENIPUNCTURE: CPT

## 2022-09-07 PROCEDURE — 3700000001 HC ADD 15 MINUTES (ANESTHESIA): Performed by: SPECIALIST

## 2022-09-07 PROCEDURE — 94664 DEMO&/EVAL PT USE INHALER: CPT

## 2022-09-07 PROCEDURE — 6360000002 HC RX W HCPCS: Performed by: SPECIALIST

## 2022-09-07 PROCEDURE — 82962 GLUCOSE BLOOD TEST: CPT

## 2022-09-07 PROCEDURE — 2580000003 HC RX 258: Performed by: STUDENT IN AN ORGANIZED HEALTH CARE EDUCATION/TRAINING PROGRAM

## 2022-09-07 PROCEDURE — 82746 ASSAY OF FOLIC ACID SERUM: CPT

## 2022-09-07 PROCEDURE — 6370000000 HC RX 637 (ALT 250 FOR IP): Performed by: STUDENT IN AN ORGANIZED HEALTH CARE EDUCATION/TRAINING PROGRAM

## 2022-09-07 PROCEDURE — 83540 ASSAY OF IRON: CPT

## 2022-09-07 PROCEDURE — 6370000000 HC RX 637 (ALT 250 FOR IP): Performed by: SPECIALIST

## 2022-09-07 PROCEDURE — 76000 FLUOROSCOPY <1 HR PHYS/QHP: CPT

## 2022-09-07 PROCEDURE — 86141 C-REACTIVE PROTEIN HS: CPT

## 2022-09-07 PROCEDURE — 84145 PROCALCITONIN (PCT): CPT

## 2022-09-07 PROCEDURE — 0T778DZ DILATION OF LEFT URETER WITH INTRALUMINAL DEVICE, VIA NATURAL OR ARTIFICIAL OPENING ENDOSCOPIC: ICD-10-PCS | Performed by: SPECIALIST

## 2022-09-07 PROCEDURE — 83550 IRON BINDING TEST: CPT

## 2022-09-07 PROCEDURE — 83880 ASSAY OF NATRIURETIC PEPTIDE: CPT

## 2022-09-07 PROCEDURE — 2580000003 HC RX 258: Performed by: ANESTHESIOLOGY

## 2022-09-07 PROCEDURE — 2580000003 HC RX 258: Performed by: NURSE ANESTHETIST, CERTIFIED REGISTERED

## 2022-09-07 PROCEDURE — 85045 AUTOMATED RETICULOCYTE COUNT: CPT

## 2022-09-07 PROCEDURE — 85025 COMPLETE CBC W/AUTO DIFF WBC: CPT

## 2022-09-07 PROCEDURE — 3700000000 HC ANESTHESIA ATTENDED CARE: Performed by: SPECIALIST

## 2022-09-07 PROCEDURE — 3600000013 HC SURGERY LEVEL 3 ADDTL 15MIN: Performed by: SPECIALIST

## 2022-09-07 PROCEDURE — 82607 VITAMIN B-12: CPT

## 2022-09-07 DEVICE — VARIABLE LENGTH URETERAL STENT
Type: IMPLANTABLE DEVICE | Site: URETER | Status: FUNCTIONAL
Brand: CONTOUR VL™

## 2022-09-07 RX ORDER — IPRATROPIUM BROMIDE AND ALBUTEROL SULFATE 2.5; .5 MG/3ML; MG/3ML
1 SOLUTION RESPIRATORY (INHALATION)
Status: ACTIVE | OUTPATIENT
Start: 2022-09-07 | End: 2022-09-07

## 2022-09-07 RX ORDER — DIPHENHYDRAMINE HYDROCHLORIDE 50 MG/ML
12.5 INJECTION INTRAMUSCULAR; INTRAVENOUS
Status: ACTIVE | OUTPATIENT
Start: 2022-09-07 | End: 2022-09-07

## 2022-09-07 RX ORDER — SODIUM CHLORIDE 0.9 % (FLUSH) 0.9 %
5-40 SYRINGE (ML) INJECTION EVERY 12 HOURS SCHEDULED
Status: DISCONTINUED | OUTPATIENT
Start: 2022-09-07 | End: 2022-09-17

## 2022-09-07 RX ORDER — MEPERIDINE HYDROCHLORIDE 25 MG/ML
12.5 INJECTION INTRAMUSCULAR; INTRAVENOUS; SUBCUTANEOUS ONCE
Status: DISCONTINUED | OUTPATIENT
Start: 2022-09-07 | End: 2022-09-21 | Stop reason: HOSPADM

## 2022-09-07 RX ORDER — FERROUS SULFATE 325(65) MG
325 TABLET ORAL 2 TIMES DAILY WITH MEALS
Status: DISCONTINUED | OUTPATIENT
Start: 2022-09-07 | End: 2022-09-21 | Stop reason: HOSPADM

## 2022-09-07 RX ORDER — HYDRALAZINE HYDROCHLORIDE 20 MG/ML
10 INJECTION INTRAMUSCULAR; INTRAVENOUS
Status: DISCONTINUED | OUTPATIENT
Start: 2022-09-07 | End: 2022-09-10

## 2022-09-07 RX ORDER — LIDOCAINE HYDROCHLORIDE 20 MG/ML
INJECTION, SOLUTION INTRAVENOUS PRN
Status: DISCONTINUED | OUTPATIENT
Start: 2022-09-07 | End: 2022-09-07 | Stop reason: SDUPTHER

## 2022-09-07 RX ORDER — DEXTROSE MONOHYDRATE 100 MG/ML
INJECTION, SOLUTION INTRAVENOUS CONTINUOUS PRN
Status: DISCONTINUED | OUTPATIENT
Start: 2022-09-07 | End: 2022-09-19 | Stop reason: SDUPTHER

## 2022-09-07 RX ORDER — PROCHLORPERAZINE EDISYLATE 5 MG/ML
5 INJECTION INTRAMUSCULAR; INTRAVENOUS
Status: ACTIVE | OUTPATIENT
Start: 2022-09-07 | End: 2022-09-07

## 2022-09-07 RX ORDER — SODIUM CHLORIDE, SODIUM LACTATE, POTASSIUM CHLORIDE, CALCIUM CHLORIDE 600; 310; 30; 20 MG/100ML; MG/100ML; MG/100ML; MG/100ML
INJECTION, SOLUTION INTRAVENOUS CONTINUOUS PRN
Status: DISCONTINUED | OUTPATIENT
Start: 2022-09-07 | End: 2022-09-07 | Stop reason: SDUPTHER

## 2022-09-07 RX ORDER — CEFAZOLIN SODIUM 1 G/3ML
INJECTION, POWDER, FOR SOLUTION INTRAMUSCULAR; INTRAVENOUS PRN
Status: DISCONTINUED | OUTPATIENT
Start: 2022-09-07 | End: 2022-09-07 | Stop reason: SDUPTHER

## 2022-09-07 RX ORDER — ONDANSETRON 2 MG/ML
4 INJECTION INTRAMUSCULAR; INTRAVENOUS
Status: DISPENSED | OUTPATIENT
Start: 2022-09-07 | End: 2022-09-07

## 2022-09-07 RX ORDER — OXYCODONE HYDROCHLORIDE 5 MG/1
5 TABLET ORAL
Status: ACTIVE | OUTPATIENT
Start: 2022-09-07 | End: 2022-09-07

## 2022-09-07 RX ORDER — SODIUM CHLORIDE 0.9 % (FLUSH) 0.9 %
5-40 SYRINGE (ML) INJECTION PRN
Status: DISCONTINUED | OUTPATIENT
Start: 2022-09-07 | End: 2022-09-21 | Stop reason: HOSPADM

## 2022-09-07 RX ORDER — PROPOFOL 10 MG/ML
INJECTION, EMULSION INTRAVENOUS PRN
Status: DISCONTINUED | OUTPATIENT
Start: 2022-09-07 | End: 2022-09-07 | Stop reason: SDUPTHER

## 2022-09-07 RX ORDER — FENTANYL CITRATE 50 UG/ML
50 INJECTION, SOLUTION INTRAMUSCULAR; INTRAVENOUS EVERY 5 MIN PRN
Status: DISCONTINUED | OUTPATIENT
Start: 2022-09-07 | End: 2022-09-21 | Stop reason: HOSPADM

## 2022-09-07 RX ORDER — SODIUM CHLORIDE, SODIUM LACTATE, POTASSIUM CHLORIDE, CALCIUM CHLORIDE 600; 310; 30; 20 MG/100ML; MG/100ML; MG/100ML; MG/100ML
INJECTION, SOLUTION INTRAVENOUS CONTINUOUS
Status: DISCONTINUED | OUTPATIENT
Start: 2022-09-07 | End: 2022-09-08

## 2022-09-07 RX ORDER — LORAZEPAM 2 MG/ML
0.5 INJECTION INTRAMUSCULAR
Status: DISCONTINUED | OUTPATIENT
Start: 2022-09-07 | End: 2022-09-07 | Stop reason: HOSPADM

## 2022-09-07 RX ORDER — SODIUM CHLORIDE 9 MG/ML
25 INJECTION, SOLUTION INTRAVENOUS PRN
Status: DISCONTINUED | OUTPATIENT
Start: 2022-09-07 | End: 2022-09-19

## 2022-09-07 RX ADMIN — DOCUSATE SODIUM 100 MG: 100 CAPSULE, LIQUID FILLED ORAL at 21:33

## 2022-09-07 RX ADMIN — ATORVASTATIN CALCIUM 80 MG: 40 TABLET, FILM COATED ORAL at 21:33

## 2022-09-07 RX ADMIN — SODIUM CHLORIDE, POTASSIUM CHLORIDE, SODIUM LACTATE AND CALCIUM CHLORIDE: 600; 310; 30; 20 INJECTION, SOLUTION INTRAVENOUS at 14:13

## 2022-09-07 RX ADMIN — PROPOFOL 150 MG: 10 INJECTION, EMULSION INTRAVENOUS at 14:27

## 2022-09-07 RX ADMIN — SODIUM CHLORIDE, PRESERVATIVE FREE 10 ML: 5 INJECTION INTRAVENOUS at 08:03

## 2022-09-07 RX ADMIN — CEFAZOLIN 2000 MG: 1 INJECTION, POWDER, FOR SOLUTION INTRAMUSCULAR; INTRAVENOUS; PARENTERAL at 14:34

## 2022-09-07 RX ADMIN — ONDANSETRON 4 MG: 2 INJECTION INTRAMUSCULAR; INTRAVENOUS at 20:14

## 2022-09-07 RX ADMIN — PANTOPRAZOLE SODIUM 40 MG: 40 TABLET, DELAYED RELEASE ORAL at 06:05

## 2022-09-07 RX ADMIN — Medication 10 ML: at 21:37

## 2022-09-07 RX ADMIN — FERROUS SULFATE TAB 325 MG (65 MG ELEMENTAL FE) 325 MG: 325 (65 FE) TAB at 18:37

## 2022-09-07 RX ADMIN — SODIUM CHLORIDE, PRESERVATIVE FREE 10 ML: 5 INJECTION INTRAVENOUS at 21:38

## 2022-09-07 RX ADMIN — POLYETHYLENE GLYCOL (3350) 17 G: 17 POWDER, FOR SOLUTION ORAL at 21:33

## 2022-09-07 RX ADMIN — SODIUM CHLORIDE, POTASSIUM CHLORIDE, SODIUM LACTATE AND CALCIUM CHLORIDE: 600; 310; 30; 20 INJECTION, SOLUTION INTRAVENOUS at 18:32

## 2022-09-07 RX ADMIN — CARVEDILOL 6.25 MG: 6.25 TABLET, FILM COATED ORAL at 18:37

## 2022-09-07 RX ADMIN — LEVOTHYROXINE SODIUM 75 MCG: 75 TABLET ORAL at 06:05

## 2022-09-07 RX ADMIN — ENOXAPARIN SODIUM 40 MG: 100 INJECTION SUBCUTANEOUS at 18:37

## 2022-09-07 RX ADMIN — Medication 10 MG: at 21:33

## 2022-09-07 RX ADMIN — INSULIN GLARGINE 20 UNITS: 100 INJECTION, SOLUTION SUBCUTANEOUS at 21:40

## 2022-09-07 RX ADMIN — PREGABALIN 50 MG: 50 CAPSULE ORAL at 21:33

## 2022-09-07 RX ADMIN — LIDOCAINE HYDROCHLORIDE 100 MG: 20 INJECTION, SOLUTION INTRAVENOUS at 14:27

## 2022-09-07 ASSESSMENT — PAIN - FUNCTIONAL ASSESSMENT
PAIN_FUNCTIONAL_ASSESSMENT: PREVENTS OR INTERFERES SOME ACTIVE ACTIVITIES AND ADLS
PAIN_FUNCTIONAL_ASSESSMENT: PREVENTS OR INTERFERES SOME ACTIVE ACTIVITIES AND ADLS
PAIN_FUNCTIONAL_ASSESSMENT: NONE - DENIES PAIN

## 2022-09-07 ASSESSMENT — PAIN DESCRIPTION - PAIN TYPE
TYPE: ACUTE PAIN
TYPE: ACUTE PAIN

## 2022-09-07 ASSESSMENT — PAIN SCALES - WONG BAKER
WONGBAKER_NUMERICALRESPONSE: 6
WONGBAKER_NUMERICALRESPONSE: 0
WONGBAKER_NUMERICALRESPONSE: 6
WONGBAKER_NUMERICALRESPONSE: 6

## 2022-09-07 ASSESSMENT — PAIN DESCRIPTION - FREQUENCY
FREQUENCY: INTERMITTENT
FREQUENCY: INTERMITTENT

## 2022-09-07 ASSESSMENT — PAIN SCALES - GENERAL
PAINLEVEL_OUTOF10: 0
PAINLEVEL_OUTOF10: 5
PAINLEVEL_OUTOF10: 5

## 2022-09-07 ASSESSMENT — PAIN DESCRIPTION - LOCATION
LOCATION: HAND
LOCATION: HAND

## 2022-09-07 ASSESSMENT — PAIN DESCRIPTION - DESCRIPTORS
DESCRIPTORS: ACHING;BURNING;CRUSHING;DISCOMFORT
DESCRIPTORS: BURNING;ACHING;DISCOMFORT

## 2022-09-07 ASSESSMENT — PAIN DESCRIPTION - ORIENTATION
ORIENTATION: LEFT
ORIENTATION: RIGHT;LEFT

## 2022-09-07 NOTE — PROGRESS NOTES
V2.0  [facility] Hospitalist Progress Note      Name:  Flora Mathis /Age/Sex: 1940  (78 y.o. male)   MRN & CSN:  [id number] & [id number] Encounter Date/Time: 2022 1:46 PM EDT    Location:  OR/NONE PCP: Sapphire Clark MD       Hospital Day: 11    Assessment and Plan:   Flora Mathis is a 78 y.o. male with pmh of prostate cancer, diabetes mellitus, hypertension who admitted with sciatic found out with Complicated UTI (urinary tract infection)    # ESBL bacteremia: Secondary to UTI  # Complicated UTI  - Urine culture on  growing E. coli resistant to Cipro and cefepime  - Urine culture on  no significant growth  - CT abdomen/pelvis showing left ureteral stent in place, mild bilateral urinary tract dilatation with new right perinephric and periureteral stranding raises concern for UTI  - Ultrasound abdomen unremarkable  - Blood cultures  no growth  - Continue IV meropenem started on  end date , per ID patient got midline  - PT/OT evaluation    # Cough: Improved after cough syrup and IV Lasix  - Chest x-ray no acute abnormality, , proBNP 1618, Pro-Vinh 3.28  - Continue with cough meds    # Normocytic anemia no sign of any active bleeding suspected secondary to medication versus  - Anemia work-up with occult blood, ferritin, iron panel, B12/folate  - Continue with H&H  -Transfuse if hemoglobin less than 7    # History of prostate cancer status post prostatectomy      # Hypertension: Blood pressure stable continue on current medication     # Hypothyroidism continue levothyroxine     # CKD  - Avoid nephrotoxic      # Type 2 diabetes: Continue SSI, hypoglycemic protocol diabetic diet      Diet Diet NPO   DVT Prophylaxis [x] Lovenox, []  Heparin, [] SCDs, [] Ambulation,  [] Eliquis, [] Xarelto  [] Coumadin   Code Status Full Code   Disposition From: Home  Expected Disposition: Home versus SNF  Estimated Date of Discharge: TBD  Patient requires continued admission due to IV antibiotics   Surrogate Decision Maker/ POA Son     Subjective:     Chief Complaint: Fatigue (General weakness started this am. Family called because pt had changed from baseline. Currently being tx for UTI. Pt is slightly confused, doesn't know the year. He has been N/V. )     Patient seen and examined at bedside, no overnight complaints, denies any fever, chills, urinary symptoms         Review of Systems:    Review of Systems    Constitutional: No fever no chills  HEENT: No headache no dizziness  Cardiovascular: No chest pain no palpitations  Respiratory: No cough no shortness of breath  Abdomen: No diarrhea, no nausea, no vomiting, no abdominal pain  Musculoskeletal: No swelling  Neuro: No numbness or tingling  Skin: No rash      Objective: Intake/Output Summary (Last 24 hours) at 9/7/2022 1403  Last data filed at 9/7/2022 1342  Gross per 24 hour   Intake --   Output 1075 ml   Net -1075 ml          Vitals:   Vitals:    09/07/22 1334   BP: (!) 158/58   Pulse: 84   Resp: 18   Temp: 98 °F (36.7 °C)   SpO2: 94%       Physical Exam:     General: Afebrile no distress  Eyes: EOMI  ENT: neck supple  Cardiovascular: S1-S2 normal no murmur  Respiratory: Bilateral diffuse crackles but no wheezing  Gastrointestinal: Soft, non tender  Genitourinary: no suprapubic tenderness  Musculoskeletal: No edema  Skin: warm, dry  Neuro: Alert. Psych: Mood appropriate.      Medications:   Medications:    meropenem  1,000 mg IntraVENous Q12H    carvedilol  6.25 mg Oral BID WC    lidocaine  1 patch TransDERmal Daily    insulin lispro  0-16 Units SubCUTAneous TID WC    insulin lispro  0-4 Units SubCUTAneous 2 times per day    insulin glargine  20 Units SubCUTAneous Nightly    amLODIPine  10 mg Oral Daily    aspirin  81 mg Oral Daily    atorvastatin  80 mg Oral Nightly    levothyroxine  75 mcg Oral Daily    docusate sodium  100 mg Oral BID    bisacodyl  10 mg Rectal Daily    melatonin  10 mg Oral Nightly    pantoprazole  40 mg Oral QAM 0.4 K/CU MM    Basophils Absolute 0.0 K/CU MM    Nucleated RBC % 0.0 %    Total Nucleated RBC 0.0 K/CU MM    Total Immature Neutrophil 0.06 K/CU MM    Immature Neutrophil % 0.6 (H) 0 - 0.43 %   Comprehensive Metabolic Panel w/ Reflex to MG    Collection Time: 09/07/22  6:34 AM   Result Value Ref Range    Sodium 139 135 - 145 MMOL/L    Potassium 4.3 3.5 - 5.1 MMOL/L    Chloride 104 99 - 110 mMol/L    CO2 25 21 - 32 MMOL/L    BUN 24 (H) 6 - 23 MG/DL    Creatinine 1.8 (H) 0.9 - 1.3 MG/DL    Glucose 175 (H) 70 - 99 MG/DL    Calcium 8.4 8.3 - 10.6 MG/DL    Albumin 2.7 (L) 3.4 - 5.0 GM/DL    Total Protein 5.4 (L) 6.4 - 8.2 GM/DL    Total Bilirubin 0.3 0.0 - 1.0 MG/DL    ALT 12 10 - 40 U/L    AST 15 15 - 37 IU/L    Alkaline Phosphatase 89 40 - 128 IU/L    GFR Non- 36 (L) >60 mL/min/1.73m2    GFR  44 (L) >60 mL/min/1.73m2    Anion Gap 10 4 - 16   Procalcitonin    Collection Time: 09/07/22  6:34 AM   Result Value Ref Range    Procalcitonin 3.28    C-Reactive Protein    Collection Time: 09/07/22  6:34 AM   Result Value Ref Range    CRP, High Sensitivity 186.3 (H) 0.0 - 5.0 mg/L   Brain Natriuretic Peptide    Collection Time: 09/07/22  6:34 AM   Result Value Ref Range    Pro-BNP 1,618 (H) <300 PG/ML   Ferritin    Collection Time: 09/07/22  6:34 AM   Result Value Ref Range    Ferritin 508 (H) 30 - 400 NG/ML   Iron and TIBC    Collection Time: 09/07/22  6:34 AM   Result Value Ref Range    Iron 12 (L) 59 - 158 ug/dL    UIBC 159 110 - 370 ug/dL    TIBC 171 (L) 250 - 450 ug/dL    Transferrin % 7 (L) 10 - 44 %   Reticulocytes    Collection Time: 09/07/22  6:34 AM   Result Value Ref Range    Retic Ct Pct 1.0 0.2 - 2.20 %   Vitamin B12 & Folate    Collection Time: 09/07/22  6:34 AM   Result Value Ref Range    Vitamin B-12 389.9 211 - 911 pg/ml    Folate 15.1 3.1 - 17.5 NG/ML   POCT Glucose    Collection Time: 09/07/22  7:53 AM   Result Value Ref Range    POC Glucose 167 (H) 70 - 99 MG/DL   POCT Glucose Collection Time: 09/07/22 12:29 PM   Result Value Ref Range    POC Glucose 169 (H) 70 - 99 MG/DL        Imaging/Diagnostics Last 24 Hours   No results found.     Electronically signed by Zainab Thompson MD on 9/7/2022 at 2:03 PM

## 2022-09-07 NOTE — ANESTHESIA POSTPROCEDURE EVALUATION
Department of Anesthesiology  Postprocedure Note    Patient: Yesenia Carrillo  MRN: [id number]  YOB: 1940  Date of evaluation: 9/7/2022      Procedure Summary     Date: 09/07/22 Room / Location: [address]    Anesthesia Start: 1416 Anesthesia Stop: 1501    Procedure: LEFT CYSTOSCOPY URETERAL STENT EXCHANGE AND [address] (Left) Diagnosis:       Other hydronephrosis      (Other hydronephrosis [N13.39])    Surgeons: Basilia Mulligan MD Responsible Provider: Brain Page MD    Anesthesia Type: MAC ASA Status: 3          Anesthesia Type: No value filed.     Jackeline Phase I: Jackeline Score: 10    Jackeline Phase II:        Anesthesia Post Evaluation    Patient location during evaluation: floor  Patient participation: complete - patient participated  Level of consciousness: awake  Pain score: 3  Airway patency: patent  Nausea & Vomiting: no vomiting and no nausea  Complications: no  Cardiovascular status: blood pressure returned to baseline and hemodynamically stable  Respiratory status: acceptable  Hydration status: stable

## 2022-09-07 NOTE — PROGRESS NOTES
At 3:06pm  Patient back from having left Cystoscopy and Suprapubic Hopper Catheter Exchange. Report given to this nurse from [facility]. Patient resting quietly in bed. Vitals B/P 172/80 HR 84  Temp 100.0 Resp. 18 O2 94%RA. Patient denies SOB or resp. distress. No other concerns voiced. Safety measures in place.

## 2022-09-07 NOTE — PROGRESS NOTES
[facility]   Progress Note  [facility] 0 1 2      Date: 2022   Patient: Natalia Virk   : 1940   DOA: 2022   MRN: [id number]   ROOM#: [id number]     Admit Date: 2022     Collaborating Urologist on Call at time of admission: Dr. Dominic Guidry  CC: Fatigue  Reason for Consult: Hematuria with acute blood loss anemia    Subjective:     Pain: mild, no nausea and no vomiting,   Bowel Movement/Flatus:   Yes  Voiding:  SPT in place, urine clear yellow w some debris noted in tubing     Pt resting in bed, states he is feeling OK but very tired. Discussed plan for cystoscopy, left ureteral stent exchange, and suprapubic catheter exchange this afternoon with associated risks/complication. Pt verbalizes understanding and is agreeable to proceed. Objective:    Vitals:    BP (!) 151/65   Pulse 76   Temp 99.2 °F (37.3 °C) (Oral)   Resp 18   Ht 5' 8\" (1.727 m)   Wt 208 lb 1.8 oz (94.4 kg)   SpO2 96%   BMI 31.64 kg/m²    Temp  Av.7 °F (37.6 °C)  Min: 99 °F (37.2 °C)  Max: 100.3 °F (37.9 °C)     Intake/Output Summary (Last 24 hours) at 2022 0955  Last data filed at 2022 0537  Gross per 24 hour   Intake --   Output 1475 ml   Net -1475 ml       Physical Exam:  General appearance: alert, appears stated age, cooperative, fatigued, no distress, mildly obese and slowed mentation  Head: Normocephalic, without obvious abnormality, atraumatic  Back: No CVA tenderness  Abdomen: Soft, non-tender, non-distended. 16fr SPt in place, urine clear yellow with some debris noted in tubing.     Labs:   WBC:    Lab Results   Component Value Date/Time    WBC 10.4 2022 06:34 AM      Hemoglobin/Hematocrit:    Lab Results   Component Value Date/Time    HGB 7.0 2022 06:34 AM    HCT 22.4 2022 06:34 AM      BMP:   Lab Results   Component Value Date/Time     2022 06:34 AM    K 4.3 2022 06:34 AM    K 3.9 2018 04:42 AM     2022 06:34 AM    CO2 25 09/07/2022 06:34 AM    BUN 24 09/07/2022 06:34 AM    LABALBU 2.7 09/07/2022 06:34 AM    CREATININE 1.8 09/07/2022 06:34 AM    CALCIUM 8.4 09/07/2022 06:34 AM    GFRAA 44 09/07/2022 06:34 AM    LABGLOM 36 09/07/2022 06:34 AM      PT/INR:    Lab Results   Component Value Date/Time    PROTIME 12.5 04/18/2022 09:51 AM    PROTIME 15.2 01/24/2011 06:48 PM    INR 0.97 04/18/2022 09:51 AM      PTT:    Lab Results   Component Value Date/Time    APTT 33.6 04/18/2022 09:51 AM     Blood Culture: 4/4 bottles +ESBL E.coli  Urine Culture: >50K CFU/ml mixed growth    Imaging:   CT ABDOMEN PELVIS WO CONTRAST Additional Contrast? None    Result Date: 9/1/2022  EXAMINATION: CT OF THE ABDOMEN AND PELVIS WITHOUT CONTRAST 9/1/2022 3:06 pm TECHNIQUE: CT of the abdomen and pelvis was performed without the administration of intravenous contrast. Multiplanar reformatted images are provided for review. Automated exposure control, iterative reconstruction, and/or weight based adjustment of the mA/kV was utilized to reduce the radiation dose to as low as reasonably achievable. COMPARISON: 04/18/2022. HISTORY: ORDERING SYSTEM PROVIDED HISTORY: abdominal pain TECHNOLOGIST PROVIDED HISTORY: Reason for exam:->abdominal pain Additional Contrast?->None Reason for Exam: abdominal pain, POSSIBLE UTI FINDINGS: Lower Chest: The visualized lung bases demonstrate subsegmental atelectasis/scarring. Trace pericardial fluid. Questionable trace bilateral pleural fluid. Atherosclerosis in the visualized thoracic aorta. Coronary artery calcifications. Small hiatal hernia. Liver: Normal. Gallbladder and Bile Ducts: Normal. Spleen: Normal. Adrenal Glands: Normal. Pancreas: Normal. Genitourinary: Left ureteral stent with minimal dilatation of the left urinary tract. Mild dilatation of the right urinary tract. Mild dilatation of the right urinary tract with increased right perinephric and periureteral stranding. No definite urinary stones.   Suprapubic catheter in the decompressed urinary bladder. Bowel: Normal caliber bowel. Normal appendix. No significant diverticular disease. Vasculature: Atherosclerosis. Normal caliber abdominal aorta. Bones and Soft Tissues: Fat containing right inguinal hernia. Degenerative changes in the visualized spine and pelvis. Retroperitoneum/Mesentery: No intraperitoneal free air, ascites or fluid collection. No lymphadenopathy in the abdomen or pelvis. Stranding/fluid throughout the retroperitoneum. 1.  Left ureteral stent in place. Mild bilateral urinary tract dilatation with new right perinephric and periureteral stranding raises concern for urinary tract infection. No urinary stones. 2.  Suprapubic catheter in the decompressed urinary bladder. 3.  Ill-defined stranding/fluid throughout the retroperitoneum may relate to vascular congestion or reactive changes. No intraperitoneal free air or abscess. XR CHEST PORTABLE    Result Date: 8/28/2022  EXAMINATION: ONE XRAY VIEW OF THE CHEST 8/28/2022 3:37 pm COMPARISON: 04/18/2022 HISTORY: ORDERING SYSTEM PROVIDED HISTORY: emergency TECHNOLOGIST PROVIDED HISTORY: Reason for exam:->emergency Reason for Exam: fatigue, emergency, fever FINDINGS: Increased interstitial opacities seen throughout both lungs. A focal infiltrate is not identified. The cardial pericardial silhouette is unremarkable. No pneumothorax is seen. No free air. No acute bony abnormality. Increased interstitial opacity. While much or all of this may be chronic, please correlate with any clinical evidence of acute interstitial edema. US ABDOMEN LIMITED Specify organ?  LIVER, GALLBLADDER    Result Date: 9/3/2022  EXAMINATION: RIGHT UPPER QUADRANT ULTRASOUND 9/3/2022 4:43 am COMPARISON: CT 09/01/2022, ultrasound 05/06/2020 HISTORY: ORDERING SYSTEM PROVIDED HISTORY: abdominal pain TECHNOLOGIST PROVIDED HISTORY: Reason for exam:->abdominal pain Specify organ?->LIVER Specify organ?->GALLBLADDER FINDINGS: LIVER:  The liver demonstrates normal echogenicity without evidence of intrahepatic biliary ductal dilatation. BILIARY SYSTEM:  Gallbladder is unremarkable without evidence of pericholecystic fluid, wall thickening or stones. Negative sonographic Lima's sign. Common bile duct is within normal limits measuring 3 mm. RIGHT KIDNEY: The right kidney is grossly unremarkable without evidence of hydronephrosis. PANCREAS:  Visualized portions of the pancreas are unremarkable. OTHER: No evidence of right upper quadrant ascites. Unremarkable right upper quadrant ultrasound. Assessment & Plan:      Kirsty Blunt is a 78 y.o. male admitted 0/31/2661 for metabolic encephalopathy. 1) Chronic Urinary Retention: managed with suprapubic catheter and exchanged monthly, last exchanged upon admission 8/28/22. Recommend SPT exchanges q3 weeks. 2) Chronic Left Hydronephrosis: s/p cystoscopy, left ureteral stent exchange 6/29/22. S/p cystoscopy, left ureteral stent placement 7/4/21              CT a/p 9/1/22: Left ureteral stent in place. Mild bilateral urinary tract dilatation with new right perinephric and periureteral stranding raises concern for urinary tract infection. No urinary stones. Suprapubic catheter in the decompressed urinary bladder. Recommend left ureteral stent exchanges q3 months. Pt's family would like to have stent changed while in house. Plan for cystoscopy, left ureteral stent exchange this afternoon with Dr. Dami Garcia. NPO since MN. 3) Sepsis w Complicated UTI: urine cx 8/29/22 >50K CFU/ml mixed growth, likely contaminated. Blood cultures 4/4 +ESBL E.coli              WBC 6.0, afebrile              On IV Merrem   Recommend irrigate SP w saline 100 ml daily. 4) H/o Prostate Cancer: S/p RPP with Dr. Emanuel Files in [address] [place] [address]. PSA 0.93 2/2021. On Eligard q6 months     Will follow. Patient seen and examined, chart reviewed.      Electronically signed by Kathleen Rodriguez

## 2022-09-07 NOTE — PROGRESS NOTES
Progress Note( Dr. Sweta Childers)  9/6/2022  Subjective:   Admit Date: 8/28/2022  PCP: Amrita Braun MD    Admitted For :Fatigue and possible sepsis from UTI    Consulted For: Control of blood glucose as patient has having hypoglycemic episodes    Interval History: feeling Somewhat better had  no more episode of low blood glucose yesterday    Denies any chest pains,   Denies SOB . Denies nausea or vomiting. No new bowel or bladder symptoms. Intake/Output Summary (Last 24 hours) at 9/6/2022 2304  Last data filed at 9/6/2022 1130  Gross per 24 hour   Intake 250 ml   Output 600 ml   Net -350 ml         DATA    CBC:   Recent Labs     09/04/22  0557 09/05/22  0026 09/06/22  0405   WBC 6.5 7.2 8.1   HGB 8.4* 8.1* 7.4*    263 297      CMP:  Recent Labs     09/05/22  0026 09/06/22  0405    143   K 4.3 4.4    110   CO2 24 25   BUN 22 20   CREATININE 1.9* 1.8*   CALCIUM 8.4 8.0*   PROT 5.4*  --    LABALBU 3.0*  --    BILITOT 0.2  --    ALKPHOS 93  --    AST 17  --    ALT 14  --        Lipids: No results found for: CHOL, HDL, TRIG  Glucose:  Recent Labs     09/06/22  1205 09/06/22  1710 09/06/22 2007   POCGLU 248* 256* 177*       [id number]:  Lab Results   Component Value Date/Time    LABA1C 7.8 08/29/2022 01:30 AM     High Sensitivity TSH:   Lab Results   Component Value Date/Time    TSHHS 7.590 04/28/2022 06:59 AM     Free T3: No results found for: FT3  Free T4:  Lab Results   Component Value Date/Time    T4FREE 1.15 03/27/2022 08:22 AM       XR CHEST PORTABLE   Final Result   No abnormalities noted. US ABDOMEN LIMITED Specify organ? LIVER, GALLBLADDER   Final Result   Unremarkable right upper quadrant ultrasound. CT ABDOMEN PELVIS WO CONTRAST Additional Contrast? None   Final Result   1. Left ureteral stent in place. Mild bilateral urinary tract dilatation   with new right perinephric and periureteral stranding raises concern for   urinary tract infection.   No urinary stones. 2.  Suprapubic catheter in the decompressed urinary bladder. 3.  Ill-defined stranding/fluid throughout the retroperitoneum may relate to   vascular congestion or reactive changes. No intraperitoneal free air or   abscess. XR CHEST PORTABLE   Final Result   Increased interstitial opacity. While much or all of this may be chronic,   please correlate with any clinical evidence of acute interstitial edema. Scheduled Medicines   Medications:    carvedilol  6.25 mg Oral BID WC    lidocaine  1 patch TransDERmal Daily    insulin lispro  0-16 Units SubCUTAneous TID WC    insulin lispro  0-4 Units SubCUTAneous 2 times per day    insulin glargine  20 Units SubCUTAneous Nightly    meropenem  1,000 mg IntraVENous Q12H    amLODIPine  10 mg Oral Daily    aspirin  81 mg Oral Daily    atorvastatin  80 mg Oral Nightly    levothyroxine  75 mcg Oral Daily    docusate sodium  100 mg Oral BID    bisacodyl  10 mg Rectal Daily    melatonin  10 mg Oral Nightly    pantoprazole  40 mg Oral QAM AC    polyethylene glycol  17 g Oral BID    pregabalin  50 mg Oral BID    sodium chloride flush  5-40 mL IntraVENous 2 times per day    enoxaparin  40 mg SubCUTAneous QPM      Infusions:    sodium chloride      dextrose           Objective:   Vitals: /60   Pulse 71   Temp 100.3 °F (37.9 °C) (Oral)   Resp 16   Ht 5' 8\" (1.727 m)   Wt 211 lb 10.3 oz (96 kg)   SpO2 97%   BMI 32.18 kg/m²   General appearance: alert and cooperative with exam  Neck: no JVD or bruit  Thyroid : Normal lobes   Lungs: Has Vesicular Breath sounds   Heart:  regular rate and rhythm  Abdomen: soft, non-tender; bowel sounds normal; no masses,  no organomegaly  Musculoskeletal: Normal  Extremities: extremities normal, , no edema  Neurologic:  Awake, alert, oriented to name, place and time. Cranial nerves II-XII are grossly intact. Motor weakness. Sensory neuropathy. ,  and gait is abnormal.  Stable    Assessment:     Patient Active Problem List:     Gait disturbance     Generalized weakness     Diabetes mellitus (HCC)     Osteoarthritis     Anemia of chronic disorder     Infected implanted bladder sphincter cuff     Escherichia coli urinary tract infection     Hypertension     Sepsis ([address]     Acute encephalopathy     Type 2 diabetes mellitus with hypoglycemia without coma (HCC)     UTI (urinary tract infection) due to urinary indwelling catheter (HCC)     Hyperkalemia     Acute kidney failure (HCC)     Leg weakness, bilateral     Type 2 diabetes mellitus with neurological manifestations, uncontrolled ([address]     Complicated UTI (urinary tract infection)     Essential hypertension     Severe muscle deconditioning     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 (HCC)     Frequent falls     Chronic kidney disease, stage 3a ([address]     Uncontrolled type 2 diabetes mellitus with peripheral neuropathy (HCC)     Prostate cancer (HCC)     Suprapubic catheter ([address]     Sepsis due to urinary tract infection ([address]     Coagulase negative Staphylococcus bacteremia     Chronic kidney disease, stage IV (severe) (HCC)     Acute metabolic encephalopathy     SVT (supraventricular tachycardia) (HCC)     Metabolic encephalopathy     History of prostate cancer     Cigarette nicotine dependence without complication     Altered mental status     Serratia marcescens infection     Hypoglycemia      Plan:     Reviewed POC blood glucose . Labs and X ray results   Reviewed Current Medicines   On Correction bolus Humalog/ Basal Lantus Insulin regime /   Monitor Blood glucose frequently   Modified  the dose of Insulin/ other medicines as neede  Management is working to show the patient to skilled nursing unit possibly [facility] home  Will follow     .      Selam Engle MD, MD

## 2022-09-07 NOTE — CARE COORDINATION
Reviewed chart and discussed in IDR, plan is for possible d/c tomorrow with iv atb to [address] [facility]. Referral sent to [facility] on 9/2. Called and spoke with LUKE, she is not sure about this pt. She will research and call this CM back. Ting Hernandez

## 2022-09-07 NOTE — BRIEF OP NOTE
Brief Postoperative Note      Patient: Ming Michaels  YOB: 1940  MRN: [id number]    Date of Procedure: 9/7/2022    Pre-Op Diagnosis: Other hydronephrosis [N13.39]    Post-Op Diagnosis: Same       Procedure(s):  LEFT CYSTOSCOPY URETERAL STENT EXCHANGE AND PERITONEAL PACE CATHETER EXCHANGE    Surgeon(s):  Munir Mayberry MD    Assistant:  * No surgical staff found *    Anesthesia: General    Estimated Blood Loss (mL): Minimal    Complications: None    Specimens:   * No specimens in log *    Implants:  Implant Name Type Inv.  Item Serial No.  Lot No. LRB No. Used Action   STENT URET 4.8FR [id number] PERCFLX HYDR+ SFT PGTL TAPR TIP - [id number]  STENT URET 4.8FR [id number] PERCFLX HYDR+ SFT PGTL TAPR TIP  [employer] [facility]OLOG[facility]- [id number] Left 1 Implanted         Drains:   Suprapubic Catheter (Active)   Site Assessment No urethral drainage 09/07/22 1342   Urine Color Yellow 09/07/22 1342   Urine Appearance Clear 09/07/22 1342   Urine Odor Malodorous 09/07/22 1342   Collection Container Standard 09/06/22 1130   Securement Method Securing device (Describe) 09/07/22 1342   Catheter Care Completed Yes 09/06/22 1130   Catheter Best Practices  Drainage tube clipped to bed 09/06/22 1130   Status Draining 09/07/22 1342   Output (mL) 200 mL 09/07/22 1342       Findings: uneventful left stent change    Electronically signed by Munir Mayberry MD on 9/7/2022 at 2:49 PM

## 2022-09-07 NOTE — PLAN OF CARE
Problem: Discharge Planning  Goal: Discharge to home or other facility with appropriate resources  9/7/2022 0919 by Jean Marie Castillo RN  Outcome: Progressing  Flowsheets (Taken 9/7/2022 0800)  Discharge to home or other facility with appropriate resources:   Identify barriers to discharge with patient and caregiver   Arrange for needed discharge resources and transportation as appropriate   Identify discharge learning needs (meds, wound care, etc)   Refer to discharge planning if patient needs post-hospital services based on physician order or complex needs related to functional status, cognitive ability or social support system     Problem: Skin/Tissue Integrity  Goal: Absence of new skin breakdown  Description: 1. Monitor for areas of redness and/or skin breakdown  2. Assess vascular access sites hourly  3. Every 4-6 hours minimum:  Change oxygen saturation probe site  4. Every 4-6 hours:  If on nasal continuous positive airway pressure, respiratory therapy assess nares and determine need for appliance change or resting period.   9/7/2022 0919 by Jean Marie Castillo RN  Outcome: Progressing     Problem: Chronic Conditions and Co-morbidities  Goal: Patient's chronic conditions and co-morbidity symptoms are monitored and maintained or improved  9/7/2022 0919 by Jean Marie Castillo RN  Outcome: Progressing  Flowsheets (Taken 9/7/2022 0800)  Care Plan - Patient's Chronic Conditions and Co-Morbidity Symptoms are Monitored and Maintained or Improved:   Monitor and assess patient's chronic conditions and comorbid symptoms for stability, deterioration, or improvement   Collaborate with multidisciplinary team to address chronic and comorbid conditions and prevent exacerbation or deterioration   Update acute care plan with appropriate goals if chronic or comorbid symptoms are exacerbated and prevent overall improvement and discharge     Problem: Pain  Goal: Verbalizes/displays adequate comfort level or baseline comfort level  9/7/2022 0919 by Georg Lefort, RN  Outcome: Progressing  Flowsheets (Taken 9/7/2022 0800)  Verbalizes/displays adequate comfort level or baseline comfort level:   Encourage patient to monitor pain and request assistance   Assess pain using appropriate pain scale   Administer analgesics based on type and severity of pain and evaluate response   Implement non-pharmacological measures as appropriate and evaluate response   Consider cultural and social influences on pain and pain management   Notify Licensed Independent Practitioner if interventions unsuccessful or patient reports new pain     Problem: Safety - Adult  Goal: Free from fall injury  9/7/2022 0919 by Georg Lefort, RN  Outcome: Progressing  Flowsheets (Taken 9/7/2022 0919)  Free From Fall Injury: Instruct family/caregiver on patient safety     Problem: Nutrition Deficit:  Goal: Optimize nutritional status  9/7/2022 0919 by Georg Lefort, RN  Outcome: Progressing

## 2022-09-07 NOTE — PROGRESS NOTES
Occupational Therapy    Attempted. Pt to undergo cystoscopy, left ureteral stent exchange, and suprapubic catheter exchange today and deferred therapy until after procedure.  Will re-attempt later    Karina BOO/L [id number]  1:14 PM,9/7/2022

## 2022-09-08 ENCOUNTER — APPOINTMENT (OUTPATIENT)
Dept: GENERAL RADIOLOGY | Age: 82
DRG: 853 | End: 2022-09-08
Payer: MEDICARE

## 2022-09-08 LAB
ALBUMIN SERPL-MCNC: 2.8 GM/DL (ref 3.4–5)
ALP BLD-CCNC: 97 IU/L (ref 40–128)
ALT SERPL-CCNC: 6 U/L (ref 10–40)
ANION GAP SERPL CALCULATED.3IONS-SCNC: 13 MMOL/L (ref 4–16)
AST SERPL-CCNC: 12 IU/L (ref 15–37)
BASOPHILS ABSOLUTE: 0 K/CU MM
BASOPHILS RELATIVE PERCENT: 0.2 % (ref 0–1)
BILIRUB SERPL-MCNC: 0.3 MG/DL (ref 0–1)
BUN BLDV-MCNC: 26 MG/DL (ref 6–23)
CALCIUM SERPL-MCNC: 8.4 MG/DL (ref 8.3–10.6)
CHLORIDE BLD-SCNC: 102 MMOL/L (ref 99–110)
CO2: 22 MMOL/L (ref 21–32)
CREAT SERPL-MCNC: 1.9 MG/DL (ref 0.9–1.3)
DIFFERENTIAL TYPE: ABNORMAL
EOSINOPHILS ABSOLUTE: 0.1 K/CU MM
EOSINOPHILS RELATIVE PERCENT: 0.3 % (ref 0–3)
GFR AFRICAN AMERICAN: 41 ML/MIN/1.73M2
GFR NON-AFRICAN AMERICAN: 34 ML/MIN/1.73M2
GLUCOSE BLD-MCNC: 131 MG/DL (ref 70–99)
GLUCOSE BLD-MCNC: 143 MG/DL (ref 70–99)
GLUCOSE BLD-MCNC: 184 MG/DL (ref 70–99)
GLUCOSE BLD-MCNC: 200 MG/DL (ref 70–99)
GLUCOSE BLD-MCNC: 257 MG/DL (ref 70–99)
GLUCOSE BLD-MCNC: 262 MG/DL (ref 70–99)
HCT VFR BLD CALC: 24.3 % (ref 42–52)
HEMOGLOBIN: 7.6 GM/DL (ref 13.5–18)
IMMATURE NEUTROPHIL %: 0.6 % (ref 0–0.43)
LYMPHOCYTES ABSOLUTE: 1.1 K/CU MM
LYMPHOCYTES RELATIVE PERCENT: 6.3 % (ref 24–44)
MCH RBC QN AUTO: 30.3 PG (ref 27–31)
MCHC RBC AUTO-ENTMCNC: 31.3 % (ref 32–36)
MCV RBC AUTO: 96.8 FL (ref 78–100)
MONOCYTES ABSOLUTE: 1.2 K/CU MM
MONOCYTES RELATIVE PERCENT: 6.4 % (ref 0–4)
NUCLEATED RBC %: 0 %
PDW BLD-RTO: 14.8 % (ref 11.7–14.9)
PLATELET # BLD: 397 K/CU MM (ref 140–440)
PMV BLD AUTO: 10 FL (ref 7.5–11.1)
POTASSIUM SERPL-SCNC: 4.3 MMOL/L (ref 3.5–5.1)
RBC # BLD: 2.51 M/CU MM (ref 4.6–6.2)
SEGMENTED NEUTROPHILS ABSOLUTE COUNT: 15.4 K/CU MM
SEGMENTED NEUTROPHILS RELATIVE PERCENT: 86.2 % (ref 36–66)
SODIUM BLD-SCNC: 137 MMOL/L (ref 135–145)
TOTAL IMMATURE NEUTOROPHIL: 0.1 K/CU MM
TOTAL NUCLEATED RBC: 0 K/CU MM
TOTAL PROTEIN: 5.3 GM/DL (ref 6.4–8.2)
WBC # BLD: 17.9 K/CU MM (ref 4–10.5)

## 2022-09-08 PROCEDURE — 80048 BASIC METABOLIC PNL TOTAL CA: CPT

## 2022-09-08 PROCEDURE — 97535 SELF CARE MNGMENT TRAINING: CPT

## 2022-09-08 PROCEDURE — 80053 COMPREHEN METABOLIC PANEL: CPT

## 2022-09-08 PROCEDURE — 6360000002 HC RX W HCPCS: Performed by: SPECIALIST

## 2022-09-08 PROCEDURE — 1200000000 HC SEMI PRIVATE

## 2022-09-08 PROCEDURE — 6370000000 HC RX 637 (ALT 250 FOR IP): Performed by: SPECIALIST

## 2022-09-08 PROCEDURE — 36415 COLL VENOUS BLD VENIPUNCTURE: CPT

## 2022-09-08 PROCEDURE — 82962 GLUCOSE BLOOD TEST: CPT

## 2022-09-08 PROCEDURE — 85025 COMPLETE CBC W/AUTO DIFF WBC: CPT

## 2022-09-08 PROCEDURE — 2580000003 HC RX 258: Performed by: SPECIALIST

## 2022-09-08 PROCEDURE — 99232 SBSQ HOSP IP/OBS MODERATE 35: CPT | Performed by: INTERNAL MEDICINE

## 2022-09-08 PROCEDURE — 97530 THERAPEUTIC ACTIVITIES: CPT

## 2022-09-08 PROCEDURE — 2580000003 HC RX 258: Performed by: ANESTHESIOLOGY

## 2022-09-08 PROCEDURE — 74018 RADEX ABDOMEN 1 VIEW: CPT

## 2022-09-08 PROCEDURE — 87086 URINE CULTURE/COLONY COUNT: CPT

## 2022-09-08 RX ADMIN — POLYETHYLENE GLYCOL (3350) 17 G: 17 POWDER, FOR SOLUTION ORAL at 09:29

## 2022-09-08 RX ADMIN — PREGABALIN 50 MG: 50 CAPSULE ORAL at 21:29

## 2022-09-08 RX ADMIN — Medication 10 ML: at 21:30

## 2022-09-08 RX ADMIN — MEROPENEM 1000 MG: 1 INJECTION, POWDER, FOR SOLUTION INTRAVENOUS at 01:16

## 2022-09-08 RX ADMIN — FERROUS SULFATE TAB 325 MG (65 MG ELEMENTAL FE) 325 MG: 325 (65 FE) TAB at 09:29

## 2022-09-08 RX ADMIN — INSULIN GLARGINE 20 UNITS: 100 INJECTION, SOLUTION SUBCUTANEOUS at 21:33

## 2022-09-08 RX ADMIN — ENOXAPARIN SODIUM 40 MG: 100 INJECTION SUBCUTANEOUS at 16:48

## 2022-09-08 RX ADMIN — DOCUSATE SODIUM 100 MG: 100 CAPSULE, LIQUID FILLED ORAL at 09:33

## 2022-09-08 RX ADMIN — MEROPENEM 1000 MG: 1 INJECTION, POWDER, FOR SOLUTION INTRAVENOUS at 13:30

## 2022-09-08 RX ADMIN — SODIUM CHLORIDE, PRESERVATIVE FREE 10 ML: 5 INJECTION INTRAVENOUS at 09:53

## 2022-09-08 RX ADMIN — CARVEDILOL 6.25 MG: 6.25 TABLET, FILM COATED ORAL at 16:48

## 2022-09-08 RX ADMIN — POLYETHYLENE GLYCOL (3350) 17 G: 17 POWDER, FOR SOLUTION ORAL at 21:29

## 2022-09-08 RX ADMIN — PREGABALIN 50 MG: 50 CAPSULE ORAL at 09:32

## 2022-09-08 RX ADMIN — ASPIRIN 81 MG CHEWABLE TABLET 81 MG: 81 TABLET CHEWABLE at 09:33

## 2022-09-08 RX ADMIN — SODIUM CHLORIDE, PRESERVATIVE FREE 10 ML: 5 INJECTION INTRAVENOUS at 21:30

## 2022-09-08 RX ADMIN — AMLODIPINE BESYLATE 10 MG: 10 TABLET ORAL at 09:30

## 2022-09-08 RX ADMIN — Medication 10 MG: at 21:29

## 2022-09-08 RX ADMIN — INSULIN LISPRO 8 UNITS: 100 INJECTION, SOLUTION INTRAVENOUS; SUBCUTANEOUS at 18:44

## 2022-09-08 RX ADMIN — ACETAMINOPHEN 650 MG: 325 TABLET ORAL at 21:45

## 2022-09-08 RX ADMIN — ONDANSETRON 4 MG: 2 INJECTION INTRAMUSCULAR; INTRAVENOUS at 18:27

## 2022-09-08 RX ADMIN — CARVEDILOL 6.25 MG: 6.25 TABLET, FILM COATED ORAL at 09:30

## 2022-09-08 RX ADMIN — BISACODYL 10 MG: 10 SUPPOSITORY RECTAL at 09:33

## 2022-09-08 RX ADMIN — DOCUSATE SODIUM 100 MG: 100 CAPSULE, LIQUID FILLED ORAL at 21:28

## 2022-09-08 RX ADMIN — ATORVASTATIN CALCIUM 80 MG: 40 TABLET, FILM COATED ORAL at 21:29

## 2022-09-08 RX ADMIN — Medication 10 ML: at 09:39

## 2022-09-08 RX ADMIN — FERROUS SULFATE TAB 325 MG (65 MG ELEMENTAL FE) 325 MG: 325 (65 FE) TAB at 16:48

## 2022-09-08 RX ADMIN — SODIUM CHLORIDE, POTASSIUM CHLORIDE, SODIUM LACTATE AND CALCIUM CHLORIDE: 600; 310; 30; 20 INJECTION, SOLUTION INTRAVENOUS at 09:38

## 2022-09-08 RX ADMIN — Medication 10 ML: at 21:29

## 2022-09-08 ASSESSMENT — PAIN SCALES - GENERAL
PAINLEVEL_OUTOF10: 0
PAINLEVEL_OUTOF10: 0

## 2022-09-08 ASSESSMENT — PAIN SCALES - WONG BAKER: WONGBAKER_NUMERICALRESPONSE: 0

## 2022-09-08 NOTE — PROGRESS NOTES
V2.0  [facility] Hospitalist Progress Note      Name:  Jimmy Campa /Age/Sex: 1940  (78 y.o. male)   MRN & CSN:  [id number] & [id number] Encounter Date/Time: 2022 1:46 PM EDT    Location:  [address] [place] [address] PCP: Red Mcfarlane MD       Hospital Day: 12    Assessment and Plan:   Jimmy Campa is a 78 y.o. male with pmh of prostate cancer, diabetes mellitus, hypertension who admitted with sciatic found out with Complicated UTI (urinary tract infection)    # ESBL bacteremia: Secondary to UTI  # Complicated UTI  - Urine culture on  growing E. coli resistant to Cipro and cefepime  - Urine culture on  no significant growth  - CT abdomen/pelvis showing left ureteral stent in place, mild bilateral urinary tract dilatation with new right perinephric and periureteral stranding raises concern for UTI  - Ultrasound abdomen unremarkable  - Blood cultures  no growth  - Continue IV meropenem started on  end date , per ID patient got midline  - PT/OT evaluation    # Hematuria s/p cystoscopy and exchange of stent:  - Urology following recommended to manually irrigate bladder every 4 hours and as needed clots  - Monitor H&H    # Cough: Improved after cough syrup and IV Lasix  - Chest x-ray no acute abnormality, , proBNP 1618, Pro-Vinh 3.28  - Continue with cough meds    # Normocytic anemia no sign of any active bleeding suspected secondary to medication versus  - Anemia work-up with occult blood, ferritin, iron panel, B12/folate  - Continue with H&H  -Transfuse if hemoglobin less than 7    # History of prostate cancer status post prostatectomy   # Chronic urinary retention s/p suprapubic catheter and exchange every 3 weeks per urology     # Hypertension: Blood pressure stable continue on current medication     # Hypothyroidism continue levothyroxine     # CKD  - Avoid nephrotoxic      # Type 2 diabetes: Continue SSI, hypoglycemic protocol diabetic diet      Diet ADULT DIET;  Regular; 4 carb choices (60 gm/meal)  ADULT ORAL NUTRITION SUPPLEMENT; Breakfast, Dinner; Low Calorie/High Protein Oral Supplement   DVT Prophylaxis [x] Lovenox, []  Heparin, [] SCDs, [] Ambulation,  [] Eliquis, [] Xarelto  [] Coumadin   Code Status Full Code   Disposition From: Home  Expected Disposition: Home versus SNF  Estimated Date of Discharge: TBD  Patient requires continued admission due to IV antibiotics and hematuria   Surrogate Decision Maker/ POA Son     Subjective:     Chief Complaint: Fatigue (General weakness started this am. Family called because pt had changed from baseline. Currently being tx for UTI. Pt is slightly confused, doesn't know the year. He has been N/V. )     Patient seen and examined at bedside, no overnight complaints, he states he still have slight on and off cough and pain on the left side of the neck denies any fever, chills, urinary symptoms         Review of Systems:    Review of Systems    Constitutional: No fever no chills  HEENT: No headache no dizziness  Cardiovascular: No chest pain no palpitations  Respiratory: No cough no shortness of breath  Abdomen: No diarrhea, no nausea, no vomiting, no abdominal pain  Musculoskeletal: No swelling  Neuro: No numbness or tingling  Skin: No rash      Objective: Intake/Output Summary (Last 24 hours) at 9/8/2022 1346  Last data filed at 9/8/2022 0939  Gross per 24 hour   Intake 300 ml   Output 800 ml   Net -500 ml          Vitals:   Vitals:    09/08/22 0915   BP: (!) 185/74   Pulse: 78   Resp: 16   Temp: 98.7 °F (37.1 °C)   SpO2: 95%       Physical Exam:     General: Afebrile no distress  Eyes: EOMI  ENT: neck supple  Cardiovascular: S1-S2 normal no murmur  Respiratory: Bilateral diffuse crackles but no wheezing  Gastrointestinal: Soft, non tender  Genitourinary: no suprapubic tenderness  Musculoskeletal: No edema  Skin: warm, dry  Neuro: Alert. Psych: Mood appropriate.      Medications:   Medications:    meropenem  1,000 mg IntraVENous Q12H sodium chloride flush  5-40 mL IntraVENous 2 times per day    meperidine  12.5 mg IntraVENous Once    ferrous sulfate  325 mg Oral BID     carvedilol  6.25 mg Oral BID     lidocaine  1 patch TransDERmal Daily    insulin lispro  0-16 Units SubCUTAneous TID     insulin lispro  0-4 Units SubCUTAneous 2 times per day    insulin glargine  20 Units SubCUTAneous Nightly    amLODIPine  10 mg Oral Daily    aspirin  81 mg Oral Daily    atorvastatin  80 mg Oral Nightly    levothyroxine  75 mcg Oral Daily    docusate sodium  100 mg Oral BID    bisacodyl  10 mg Rectal Daily    melatonin  10 mg Oral Nightly    pantoprazole  40 mg Oral QAM AC    polyethylene glycol  17 g Oral BID    pregabalin  50 mg Oral BID    sodium chloride flush  5-40 mL IntraVENous 2 times per day    enoxaparin  40 mg SubCUTAneous QPM      Infusions:    lactated ringers 125 mL/hr at 09/08/22 0938    sodium chloride      dextrose      sodium chloride 25 mL (09/06/22 2327)    dextrose       PRN Meds: sodium chloride flush, 5-40 mL, PRN  sodium chloride, 25 mL, PRN  HYDROmorphone, 0.25 mg, Q5 Min PRN  fentanNYL, 50 mcg, Q5 Min PRN  hydrALAZINE, 10 mg, Q15 Min PRN  glucose, 4 tablet, PRN  dextrose bolus, 125 mL, PRN   Or  dextrose bolus, 250 mL, PRN  glucagon (rDNA), 1 mg, PRN  dextrose, , Continuous PRN  guaiFENesin-dextromethorphan, 5 mL, Q4H PRN  hydrALAZINE (APRESOLINE) ivpb, 10 mg, Q4H PRN  magnesium hydroxide, 30 mL, Daily PRN  sodium chloride flush, 5-40 mL, PRN  sodium chloride, , PRN  ondansetron, 4 mg, Q8H PRN   Or  ondansetron, 4 mg, Q6H PRN  aluminum & magnesium hydroxide-simethicone, 30 mL, Q6H PRN  acetaminophen, 650 mg, Q6H PRN   Or  acetaminophen, 650 mg, Q6H PRN  glucose, 4 tablet, PRN  dextrose bolus, 125 mL, PRN   Or  dextrose bolus, 250 mL, PRN  glucagon (rDNA), 1 mg, PRN  dextrose, , Continuous PRN  ipratropium-albuterol, 1 vial, BID PRN      Labs      Recent Results (from the past 24 hour(s))   POCT Glucose    Collection Time: 09/07/22  6:19 PM   Result Value Ref Range    POC Glucose 197 (H) 70 - 99 MG/DL   POCT Glucose    Collection Time: 09/07/22  8:00 PM   Result Value Ref Range    POC Glucose 214 (H) 70 - 99 MG/DL   POCT Glucose    Collection Time: 09/08/22  1:29 AM   Result Value Ref Range    POC Glucose 143 (H) 70 - 99 MG/DL   POCT Glucose    Collection Time: 09/08/22  8:22 AM   Result Value Ref Range    POC Glucose 131 (H) 70 - 99 MG/DL   POCT Glucose    Collection Time: 09/08/22 12:46 PM   Result Value Ref Range    POC Glucose 200 (H) 70 - 99 MG/DL        Imaging/Diagnostics Last 24 Hours   No results found.     Electronically signed by Jordan Massey MD on 9/8/2022 at 1:46 PM Admission

## 2022-09-08 NOTE — PROGRESS NOTES
Occupational Therapy Treatment Note  Name: Yoselyn Alfaro MRN: [id number] :   1940   Date:  2022   Admission Date: 2022 Room:  [address] [place] [address]A     Primary Problem:  The primary encounter diagnosis was Urinary tract infection associated with cystostomy catheter, initial encounter ([facility] [address] A diagnosis of Septicemia ([facility] [address] was also pertinent to this visit. Communication with other providers:  cotx with PT Angelica    Subjective:  Patient states:  \"Maybe you can start seeing me twice a day? \"  Pain: 6/10 R shoulder, bilat heels, R hand, R lower back  Restrictions: general precautions, fall risk, contact precautions  daughter at bedside    Objective:    Observation:  pt was semi fowlers in bed upon arrival, agreeable to session. Tele, mccrary with bloody output, edematous RUE  Objective Measures:  stable    Treatment, including education:  Self Care Training (8 minutes):   Self care training was performed today. Cues were given for safety, sequence, UE/LE placement, visual cues, and balance. Activities performed today included pt rolling x2 to each side with max A, max A to maintain roll, donning depends with total A with olivia pads replaced. Therapeutic Activity Training (15 minutes): Therapeutic activity training was instructed today. Cues were given for safety, sequence, UE/LE placement, visual cues, and balance. Activities performed today included pt demo supine to sit EOB with max Ax2, able to maintain sit at EOB with CGA-mod A with strong L lean progressing with fatigue. Pt returning to supine with max Ax2, demo above self cares, positioned for comfort, scooting to [facility] with max Ax2.     Education: Role of OT, OT POC, safety, benefits of EOB/OOB activity, rationale for treatment  Safety Measures: Gait belt used for safety of pt and therapist, Left in supine in bed, Alarm in place, call light and phone within reach, lines managed    Assessment / Impression:    Pt Ox4 today, lethargic with limited participation and activity tolerance. Max A x1-2 for bed mobility, unable to progress beyond EOB.     Patient's tolerance of treatment: fair  Adverse Reaction: fatigue  Significant change in status and impact:  none  Barriers to improvement: debility, safety    Plan for Next Session:    Continue OT POC    Time in:  8493  Time out:  1401  Timed treatment minutes:  23  Total treatment time:  23    Electronically signed by:    Rita Madrid OT, OTR/NORA  9/8/2022, 2:16 PM

## 2022-09-08 NOTE — PROGRESS NOTES
[facility] [address]   Progress Note  [facility] 0 1 2      Date: 2022   Patient: Jimmy Campa   : 1940   DOA: 2022   MRN: [id number]   ROOM#: [id number]     Admit Date: 2022     Collaborating Urologist on Call at time of admission: Dr. Gabriela Maldonado  CC: Fatigue  Reason for Consult: Hematuria with acute blood loss anemia  POD #1: Cystoscopy, left ureteral stent exchange, suprapubic tube exchange. Subjective:     Pain: mild, nausea and no vomiting,   Bowel Movement/Flatus:   Yes  Voiding:  SPT in place, urine clear cherry red    Pt resting in bed, states he is feeling OK today. Still fatigued. Reports decreased appetite. Urine gross bloody in SPT. I manually irrigated his bladder with 120cc NS with no clots returned. Objective:    Vitals:    BP (!) 158/66   Pulse 76   Temp 100 °F (37.8 °C) (Oral)   Resp 15   Ht 5' 8\" (1.727 m)   Wt 213 lb 13.5 oz (97 kg)   SpO2 94%   BMI 32.52 kg/m²    Temp  Av.3 °F (37.4 °C)  Min: 98 °F (36.7 °C)  Max: 100 °F (37.8 °C)     Intake/Output Summary (Last 24 hours) at 2022 0926  Last data filed at 2022 0537  Gross per 24 hour   Intake 290 ml   Output 1000 ml   Net -710 ml     Physical Exam:  General appearance: alert, appears stated age, cooperative, fatigued, no distress, mildly obese and slowed mentation  Head: Normocephalic, without obvious abnormality, atraumatic  Back: No CVA tenderness  Abdomen: Soft, non-tender, non-distended.  16fr SPt in place, urine cherry red    Labs:   WBC:    Lab Results   Component Value Date/Time    WBC 10.4 2022 06:34 AM      Hemoglobin/Hematocrit:    Lab Results   Component Value Date/Time    HGB 7.0 2022 06:34 AM    HCT 22.4 2022 06:34 AM      BMP:   Lab Results   Component Value Date/Time     2022 06:34 AM    K 4.3 2022 06:34 AM    K 3.9 2018 04:42 AM     2022 06:34 AM    CO2 25 2022 06:34 AM    BUN 24 2022 06:34 AM LABALBU 2.7 09/07/2022 06:34 AM    CREATININE 1.8 09/07/2022 06:34 AM    CALCIUM 8.4 09/07/2022 06:34 AM    GFRAA 44 09/07/2022 06:34 AM    LABGLOM 36 09/07/2022 06:34 AM      PT/INR:    Lab Results   Component Value Date/Time    PROTIME 12.5 04/18/2022 09:51 AM    PROTIME 15.2 01/24/2011 06:48 PM    INR 0.97 04/18/2022 09:51 AM      PTT:    Lab Results   Component Value Date/Time    APTT 33.6 04/18/2022 09:51 AM     Blood Culture: 4/4 bottles +ESBL E.coli  Urine Culture: >50K CFU/ml mixed growth    Imaging:   CT ABDOMEN PELVIS WO CONTRAST Additional Contrast? None    Result Date: 9/1/2022  EXAMINATION: CT OF THE ABDOMEN AND PELVIS WITHOUT CONTRAST 9/1/2022 3:06 pm TECHNIQUE: CT of the abdomen and pelvis was performed without the administration of intravenous contrast. Multiplanar reformatted images are provided for review. Automated exposure control, iterative reconstruction, and/or weight based adjustment of the mA/kV was utilized to reduce the radiation dose to as low as reasonably achievable. COMPARISON: 04/18/2022. HISTORY: ORDERING SYSTEM PROVIDED HISTORY: abdominal pain TECHNOLOGIST PROVIDED HISTORY: Reason for exam:->abdominal pain Additional Contrast?->None Reason for Exam: abdominal pain, POSSIBLE UTI FINDINGS: Lower Chest: The visualized lung bases demonstrate subsegmental atelectasis/scarring. Trace pericardial fluid. Questionable trace bilateral pleural fluid. Atherosclerosis in the visualized thoracic aorta. Coronary artery calcifications. Small hiatal hernia. Liver: Normal. Gallbladder and Bile Ducts: Normal. Spleen: Normal. Adrenal Glands: Normal. Pancreas: Normal. Genitourinary: Left ureteral stent with minimal dilatation of the left urinary tract. Mild dilatation of the right urinary tract. Mild dilatation of the right urinary tract with increased right perinephric and periureteral stranding. No definite urinary stones. Suprapubic catheter in the decompressed urinary bladder.  Bowel: Normal caliber bowel. Normal appendix. No significant diverticular disease. Vasculature: Atherosclerosis. Normal caliber abdominal aorta. Bones and Soft Tissues: Fat containing right inguinal hernia. Degenerative changes in the visualized spine and pelvis. Retroperitoneum/Mesentery: No intraperitoneal free air, ascites or fluid collection. No lymphadenopathy in the abdomen or pelvis. Stranding/fluid throughout the retroperitoneum. 1.  Left ureteral stent in place. Mild bilateral urinary tract dilatation with new right perinephric and periureteral stranding raises concern for urinary tract infection. No urinary stones. 2.  Suprapubic catheter in the decompressed urinary bladder. 3.  Ill-defined stranding/fluid throughout the retroperitoneum may relate to vascular congestion or reactive changes. No intraperitoneal free air or abscess. FL LESS THAN 1 HOUR    Result Date: 9/7/2022  Radiology exam is complete. No Radiologist dictation. Please follow up with ordering provider. XR CHEST PORTABLE    Result Date: 9/6/2022  EXAMINATION: ONE XRAY VIEW OF THE CHEST 9/6/2022 1:57 pm COMPARISON: 08/28/2022 HISTORY: ORDERING SYSTEM PROVIDED HISTORY: cough TECHNOLOGIST PROVIDED HISTORY: Reason for exam:->cough Reason for Exam: cough FINDINGS: The lungs appear clear. There are no pulmonary nodules, masses or infiltrates. There are no pleural effusions and there is no evidence of pneumothorax. The heart and mediastinal structures appear normal.  Bony structures appear unremarkable. No abnormalities noted. XR CHEST PORTABLE    Result Date: 8/28/2022  EXAMINATION: ONE XRAY VIEW OF THE CHEST 8/28/2022 3:37 pm COMPARISON: 04/18/2022 HISTORY: ORDERING SYSTEM PROVIDED HISTORY: emergency TECHNOLOGIST PROVIDED HISTORY: Reason for exam:->emergency Reason for Exam: fatigue, emergency, fever FINDINGS: Increased interstitial opacities seen throughout both lungs. A focal infiltrate is not identified.   The cardial pericardial silhouette is unremarkable. No pneumothorax is seen. No free air. No acute bony abnormality. Increased interstitial opacity. While much or all of this may be chronic, please correlate with any clinical evidence of acute interstitial edema. US ABDOMEN LIMITED Specify organ? LIVER, GALLBLADDER    Result Date: 9/3/2022  EXAMINATION: RIGHT UPPER QUADRANT ULTRASOUND 9/3/2022 4:43 am COMPARISON: CT 09/01/2022, ultrasound 05/06/2020 HISTORY: ORDERING SYSTEM PROVIDED HISTORY: abdominal pain TECHNOLOGIST PROVIDED HISTORY: Reason for exam:->abdominal pain Specify organ?->LIVER Specify organ?->GALLBLADDER FINDINGS: LIVER:  The liver demonstrates normal echogenicity without evidence of intrahepatic biliary ductal dilatation. BILIARY SYSTEM:  Gallbladder is unremarkable without evidence of pericholecystic fluid, wall thickening or stones. Negative sonographic Lima's sign. Common bile duct is within normal limits measuring 3 mm. RIGHT KIDNEY: The right kidney is grossly unremarkable without evidence of hydronephrosis. PANCREAS:  Visualized portions of the pancreas are unremarkable. OTHER: No evidence of right upper quadrant ascites. Unremarkable right upper quadrant ultrasound. Assessment & Plan:      Lakesha Casper is a 78 y.o. male admitted 2/50/3359 for metabolic encephalopathy. 1) Chronic Urinary Retention: managed with suprapubic catheter and exchanged monthly, last exchanged upon admission 8/28/22. Recommend SPT exchanges q3 weeks. 2) Chronic Left Hydronephrosis: S/p cystoscopy, left ureteral stent exchange, suprapubic tube exchange. S/p cystoscopy, left ureteral stent exchange 6/29/22. S/p cystoscopy, left ureteral stent placement 7/4/21              CT a/p 9/1/22: Left ureteral stent in place. Mild bilateral urinary tract dilatation with new right perinephric and periureteral stranding raises concern for urinary tract infection. No urinary stones. Suprapubic catheter in the decompressed urinary bladder. Recommend left ureteral stent exchanges q3 months. Gross hematuria not uncommon after stent exchanges. RN staff to manually irrigate bladder q4hrs and prn clots. 3) Sepsis w Complicated UTI: urine cx 8/29/22 >50K CFU/ml mixed growth, likely contaminated. Blood cultures 4/4 +ESBL E.coli              WBC 10.4, T-max 100 overnight              On IV Merrem              Recommend irrigate SP w saline 100 ml daily. Repeat urine culture ordered. 4) H/o Prostate Cancer: S/p RPP with Dr. Wilfred San in [address] [place] [address] PSA 0.93 2/2021. On Eligard q6 months     Will follow. Patient seen and examined, chart reviewed.      Electronically signed by Antonino Landis PA-C on 9/8/2022 at 9:26 AM

## 2022-09-08 NOTE — OP NOTE
[address] [place] [address], [address] [place] [address]                                OPERATIVE REPORT    PATIENT NAME: Kathleen Barlow                     :        1940  MED REC NO:   [id number]                          ROOM:       9254  ACCOUNT NO:   [de-identified]                           ADMIT DATE: 2022  PROVIDER:     Radha Swanson MD    DATE OF PROCEDURE:  2022    PREOPERATIVE DIAGNOSES:  1. Left hydronephrosis. 2.  Neurogenic bladder. 3.  History of prostate cancer. POSTOPERATIVE DIAGNOSES:  1. Left hydronephrosis. 2.  Neurogenic bladder. 3.  History of prostate cancer. OPERATION PERFORMED:  Cystoscopy, left ureteral stent exchange,  suprapubic tube exchange. SURGEON:  Radha Swanson MD    ANESTHESIA:  Monitored anesthesia care. ESTIMATED BLOOD LOSS:  None. COMPLICATIONS:  None. BRIEF HISTORY:  This is an 68-year-old gentleman who has an indwelling  left ureteral stent secondary to chronic hydronephrosis. He is due for  a serial change now. He was admitted recently for UTI and antibiotics. We elected to go ahead and proceed with exchange of stent and suprapubic  tube. DESCRIPTION OF PROCEDURE:  The patient was identified in the holding  area, taken to the procedure room, placed in the dorsal lithotomy  position. The patient's genital region was prepped and draped in the  usual sterile fashion. A 21-Belizean cystoscope sheath was introduced per  urethra under direct visualization status post prostatectomy. Left  ureteral stent was identified, grasped and removed intact. A Sensor  wire was passed into the left ureter under direct fluoroscopy. A new  4.8 [place]an variable length double-J ureteral stent was passed over the  guidewire in a standard fashion. Position was confirmed using  fluoroscopy, cystoscopy and deemed good. At this point then cystoscope  was removed.   I did remove the old suprapubic tube and placed the new  20-Bulgarian suprapubic tube with 10 mL of balloon and taken to the  recovery room in stable condition.         Maia Brown MD    D: 09/07/2022 14:52:22       T: 09/08/2022 1:26:42     [email or web address]  Job#: [id number]     Doc#: [id number]    CC:

## 2022-09-08 NOTE — PROGRESS NOTES
Comprehensive Nutrition Assessment    Type and Reason for Visit:  Reassess    Nutrition Recommendations/Plan:   Continue carb controlled diet, increase to higher calorie plan  Will resume oral nutrition supplement  Encourage consistent meal and supplement intake  Will continue to follow up during stay      Malnutrition Assessment:  Malnutrition Status: At risk for malnutrition (Comment) (09/08/22 1229)    Context:  Acute Illness       Nutrition Assessment:    Meal intake continues to vary, recent meals 25-75%. Reports able to eat some breakfast today. Diet changed after made NPO for surgery. Currently on carb controlled diet, 45 g/meal. Plan to modify diet to higher calorie plan to more closely meet needs. Patient also agreeable to have oral nutrition supplement. Will continue to follow at moderate nutrition risk at this time. Nutrition Related Findings:    resting in bed on visit, noted some nausea/upset stomach. glucose POCT over 200 mg/dL, now s/p cystoscopy and ureteral stent exchange Wound Type: None       Current Nutrition Intake & Therapies:    Average Meal Intake: 26-50%, 51-75%  Average Supplements Intake: None Ordered  ADULT DIET; Regular; 3 carb choices (45 gm/meal)    Anthropometric Measures:  Height: 5' 8\" (172.7 cm)  Ideal Body Weight (IBW): 154 lbs (70 kg)    Admission Body Weight: 198 lb (89.8 kg) (stated)  Current Body Weight: 213 lb 13.5 oz (97 kg), 133.8 % IBW. Weight Source: Bed Scale  Current BMI (kg/m2): 32.5  Usual Body Weight: 210 lb (95.3 kg) (Feb 2022)  % Weight Change (Calculated): -1.9                    BMI Categories: Obese Class 1 (BMI 30.0-34. 9)    Estimated Daily Nutrient Needs:  Energy Requirements Based On: Formula  Weight Used for Energy Requirements: Current  Energy (kcal/day): 2079  Weight Used for Protein Requirements: Ideal  Protein (g/day):   Method Used for Fluid Requirements: 1 ml/kcal  Fluid (ml/day): 2079    Nutrition Diagnosis:   Predicted inadequate energy intake related to increase demand for energy/nutrients (lethargy and sepsis) as evidenced by weight loss    Nutrition Interventions:   Food and/or Nutrient Delivery: Modify Current Diet, Start Oral Nutrition Supplement  Nutrition Education/Counseling: No recommendation at this time  Coordination of Nutrition Care: Continue to monitor while inpatient  Plan of Care discussed with: patient    Goals:  Previous Goal Met: Progressing toward Goal(s)  Goals: Meet at least 75% of estimated needs       Nutrition Monitoring and Evaluation:   Behavioral-Environmental Outcomes: None Identified  Food/Nutrient Intake Outcomes: Food and Nutrient Intake, Supplement Intake  Physical Signs/Symptoms Outcomes: Biochemical Data, Meal Time Behavior, Skin, Weight    Discharge Planning:    Continue current diet, Continue Oral Nutrition Supplement     Angi Milner RD, LD  Contact: [phone]

## 2022-09-08 NOTE — CARE COORDINATION
Reviewed chart and discussed in IDR, plan is to [address] one medically ready. Pt developed bloody urine, now has mccrary with irrigations. Spoke with Lui Thompson at [facility] they have approve pt to home and updated on situation. Also called and spoke with pt's son Nigel Mcmahon, he is agreeable with plan and would like Dr Mikayla Kapadia updated that pt having extreme weakness and not able to feel himself. PS to Dr Mikayla Kapadia. CM will continue to follow.

## 2022-09-08 NOTE — PROGRESS NOTES
Infectious Disease Progress Note  2022   Patient Name: Alea Pineda : 1940     Assessment  Sepsis secondary to ESBL E. coli pyelonephritis associated with suprapubic cystostomy. History of prostate cancer status post RPP. ESBL E coli bacteremia. S/p cystoscopy, left ureteral stent exchange and peritoneal mccrary catheter exchange on 2022. Low grade fever, may be post-procedural.   Hematuria: post procedure  ?ileus  CKD stage IIIB  T2DM  Diabetic neuropathy     Plan  Therapeutic: On meropenem 1 g q 12 hours , treat for 14 days (end-date 2022). After discharge the following should be done:  Weekly labs drawn on Monday during the course of treatment  CBC with differential, CMP, ESR, CRP,   Cathflo for blocked vascular access as needed  Fax results to Attn: [place]  # [phone]  See in clinic within one week after discharge  Disposition: TDB  Diagnostic: KUB  F/u:  Other:     Reason for visit: F/u ESBL E coli bacteremia and left pyelonephritis? History:? Interval history noted  Denies n/v/d/f or untoward effects of antimicrobials  Physical Exam:  Vital Signs: BP (!) 185/74   Pulse 78   Temp 98.7 °F (37.1 °C) (Oral)   Resp 16   Ht 5' 8\" (1.727 m)   Wt 213 lb 13.5 oz (97 kg)   SpO2 95%   BMI 32.52 kg/m²     Gen: alert and oriented X3, no distress  Skin: no stigmata of endocarditis  Wounds: C/D/I  HEMT: AT/NC Oropharynx pink, moist, and without lesions or exudates; dentition in good state of repair  Eyes: PERRLA, EOMI, conjunctiva pink, sclera anicteric. Neck: Supple. Trachea midline. No LAD. Chest: no distress and CTA. Good air movement. Heart: RRR and no MRG. Abd: suprapubic catheter, soft, gaseous distension, no tenderness, no hepatomegaly. Normoactive bowel sounds. Ext: no clubbing, cyanosis, or edema  Catheter Site: without erythema or tenderness  LDA: CVC:  Neuro: Mental status intact.  CN 2-12 intact and no focal sensory or motor deficits Radiologic / Imaging / TESTING  No results found.      Labs:    Recent Results (from the past 24 hour(s))   POCT Glucose    Collection Time: 09/07/22 12:29 PM   Result Value Ref Range    POC Glucose 169 (H) 70 - 99 MG/DL   POCT Glucose    Collection Time: 09/07/22  6:19 PM   Result Value Ref Range    POC Glucose 197 (H) 70 - 99 MG/DL   POCT Glucose    Collection Time: 09/07/22  8:00 PM   Result Value Ref Range    POC Glucose 214 (H) 70 - 99 MG/DL   POCT Glucose    Collection Time: 09/08/22  1:29 AM   Result Value Ref Range    POC Glucose 143 (H) 70 - 99 MG/DL   POCT Glucose    Collection Time: 09/08/22  8:22 AM   Result Value Ref Range    POC Glucose 131 (H) 70 - 99 MG/DL     CULTURE results: Invalid input(s): BLOOD CULTURE,  URINE CULTURE, SURGICAL CULTURE    Diagnosis:  Patient Active Problem List   Diagnosis    Gait disturbance    Generalized weakness    Diabetes mellitus (HCC)    Osteoarthritis    Anemia of chronic disorder    Infected implanted bladder sphincter cuff    Escherichia coli urinary tract infection    Hypertension    Sepsis ([address]    Acute encephalopathy    Type 2 diabetes mellitus with hypoglycemia without coma (HCC)    UTI (urinary tract infection) due to urinary indwelling catheter ([facility])    Hyperkalemia    Acute kidney failure (HCC)    Leg weakness, bilateral    Type 2 diabetes mellitus with neurological manifestations, uncontrolled ([facility])    Complicated UTI (urinary tract infection)    Essential hypertension    Severe muscle deconditioning    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 (HCC)    Frequent falls    Chronic kidney disease, stage 3a (HCC)    Uncontrolled type 2 diabetes mellitus with peripheral neuropathy (HCC)    Prostate cancer ([facility])    Suprapubic catheter ([address]    Sepsis due to urinary tract infection ([facility])    Coagulase negative Staphylococcus bacteremia    Chronic kidney disease, stage IV (severe) ([facility])    Acute metabolic encephalopathy    SVT (supraventricular tachycardia) ([address] Metabolic encephalopathy    History of prostate cancer    Cigarette nicotine dependence without complication    Altered mental status    Serratia marcescens infection    Hypoglycemia       Active Problems  Principal Problem:    Complicated UTI (urinary tract infection)  Resolved Problems:    * No resolved hospital problems.  *              Electronically signed by: Electronically signed by Catherne Duverney, MD on 9/8/2022 at 12:24 PM

## 2022-09-08 NOTE — PROGRESS NOTES
Patient unable to eat dinner last night and take oral medications this morning due to feeling sick to stomach and experiencing nausea/vomiting. Patient given Zofran per MAR last night and this morning for symptomatic relief.

## 2022-09-08 NOTE — PROGRESS NOTES
Progress Note( Dr. Michaela Hartley)  9/7/2022  Subjective:   Admit Date: 8/28/2022  PCP: Melissa Medina MD    Admitted For :Fatigue and possible sepsis from UTI    Consulted For: Control of blood glucose as patient has having hypoglycemic episodes    Interval History: feeling Somewhat better had  no more episode of low blood glucose yesterday    Denies any chest pains,   Denies SOB . Denies nausea or vomiting. No new bowel or bladder symptoms. Intake/Output Summary (Last 24 hours) at 9/7/2022 2348  Last data filed at 9/7/2022 1448  Gross per 24 hour   Intake 150 ml   Output 1075 ml   Net -925 ml         DATA    CBC:   Recent Labs     09/05/22  0026 09/06/22  0405 09/07/22  0634   WBC 7.2 8.1 10.4   HGB 8.1* 7.4* 7.0*    297 336      CMP:  Recent Labs     09/05/22  0026 09/06/22  0405 09/07/22  0634    143 139   K 4.3 4.4 4.3    110 104   CO2 24 25 25   BUN 22 20 24*   CREATININE 1.9* 1.8* 1.8*   CALCIUM 8.4 8.0* 8.4   PROT 5.4*  --  5.4*   LABALBU 3.0*  --  2.7*   BILITOT 0.2  --  0.3   ALKPHOS 93  --  89   AST 17  --  15   ALT 14  --  12       Lipids: No results found for: CHOL, HDL, TRIG  Glucose:  Recent Labs     09/07/22  1229 09/07/22  1819 09/07/22 2000   POCGLU 169* 197* 214*       FscihroeipX0I:  Lab Results   Component Value Date/Time    LABA1C 7.8 08/29/2022 01:30 AM     High Sensitivity TSH:   Lab Results   Component Value Date/Time    TSHHS 7.590 04/28/2022 06:59 AM     Free T3: No results found for: FT3  Free T4:  Lab Results   Component Value Date/Time    T4FREE 1.15 03/27/2022 08:22 AM       FL LESS THAN 1 HOUR   Final Result      XR CHEST PORTABLE   Final Result   No abnormalities noted. US ABDOMEN LIMITED Specify organ? LIVER, GALLBLADDER   Final Result   Unremarkable right upper quadrant ultrasound. CT ABDOMEN PELVIS WO CONTRAST Additional Contrast? None   Final Result   1. Left ureteral stent in place.   Mild bilateral urinary tract dilatation   with new right perinephric and periureteral stranding raises concern for   urinary tract infection. No urinary stones. 2.  Suprapubic catheter in the decompressed urinary bladder. 3.  Ill-defined stranding/fluid throughout the retroperitoneum may relate to   vascular congestion or reactive changes. No intraperitoneal free air or   abscess. XR CHEST PORTABLE   Final Result   Increased interstitial opacity. While much or all of this may be chronic,   please correlate with any clinical evidence of acute interstitial edema.               Scheduled Medicines   Medications:    meropenem  1,000 mg IntraVENous Q12H    sodium chloride flush  5-40 mL IntraVENous 2 times per day    meperidine  12.5 mg IntraVENous Once    ferrous sulfate  325 mg Oral BID WC    carvedilol  6.25 mg Oral BID WC    lidocaine  1 patch TransDERmal Daily    insulin lispro  0-16 Units SubCUTAneous TID WC    insulin lispro  0-4 Units SubCUTAneous 2 times per day    insulin glargine  20 Units SubCUTAneous Nightly    amLODIPine  10 mg Oral Daily    aspirin  81 mg Oral Daily    atorvastatin  80 mg Oral Nightly    levothyroxine  75 mcg Oral Daily    docusate sodium  100 mg Oral BID    bisacodyl  10 mg Rectal Daily    melatonin  10 mg Oral Nightly    pantoprazole  40 mg Oral QAM AC    polyethylene glycol  17 g Oral BID    pregabalin  50 mg Oral BID    sodium chloride flush  5-40 mL IntraVENous 2 times per day    enoxaparin  40 mg SubCUTAneous QPM      Infusions:    lactated ringers 125 mL/hr at 09/07/22 1832    sodium chloride      dextrose      sodium chloride 25 mL (09/06/22 2327)    dextrose           Objective:   Vitals: /68   Pulse 77   Temp 99.1 °F (37.3 °C) (Oral)   Resp 15   Ht 5' 8\" (1.727 m)   Wt 190 lb (86.2 kg)   SpO2 94%   BMI 28.89 kg/m²   General appearance: alert and cooperative with exam  Neck: no JVD or bruit  Thyroid : Normal lobes   Lungs: Has Vesicular Breath sounds   Heart:  regular rate and rhythm  Abdomen: soft, non-tender; bowel sounds normal; no masses,  no organomegaly  Musculoskeletal: Normal  Extremities: extremities normal, , no edema  Neurologic:  Awake, alert, oriented to name, place and time. Cranial nerves II-XII are grossly intact. Motor weakness. Sensory neuropathy. ,  and gait is abnormal.  Stable    Assessment:     Patient Active Problem List:     Gait disturbance     Generalized weakness     Diabetes mellitus (HCC)     Osteoarthritis     Anemia of chronic disorder     Infected implanted bladder sphincter cuff     Escherichia coli urinary tract infection     Hypertension     Sepsis ([address]     Acute encephalopathy     Type 2 diabetes mellitus with hypoglycemia without coma (HCC)     UTI (urinary tract infection) due to urinary indwelling catheter ([facility])     Hyperkalemia     Acute kidney failure (HCC)     Leg weakness, bilateral     Type 2 diabetes mellitus with neurological manifestations, uncontrolled ([address]     Complicated UTI (urinary tract infection)     Essential hypertension     Severe muscle deconditioning     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 (HCC)     Frequent falls     Chronic kidney disease, stage 3a ([address]     Uncontrolled type 2 diabetes mellitus with peripheral neuropathy (HCC)     Prostate cancer ([facility])     Suprapubic catheter ([address]     Sepsis due to urinary tract infection ([address]     Coagulase negative Staphylococcus bacteremia     Chronic kidney disease, stage IV (severe) (HCC)     Acute metabolic encephalopathy     SVT (supraventricular tachycardia) (HCC)     Metabolic encephalopathy     History of prostate cancer     Cigarette nicotine dependence without complication     Altered mental status     Serratia marcescens infection     Hypoglycemia      Plan:     Reviewed POC blood glucose .  Labs and X ray results   Reviewed Current Medicines   On Correction bolus Humalog/ Basal Lantus Insulin regime /   Monitor Blood glucose frequently   Modified  the dose of Insulin/ other medicines as neede  Management is working to show the patient to skilled nursing unit possibly [facility]  Will follow     .      Shahzad Guzman MD, MD

## 2022-09-08 NOTE — PROGRESS NOTES
Physical Therapy    Physical Therapy Treatment Note  Name: Lakesha Casper MRN: [id number] :   1940   Date:  2022   Admission Date: 2022 Room:  [address] [place] [address]   Restrictions/Precautions:          contact precautions, falls   Communication with other providers:  OT   Subjective:  Patient states:  Tor Hives you come in the mornings? \"   Pain:   Location, Type, Intensity (0/10 to 10/10):  6/10 right shoulder , bilat heels, right hand, and right side of low back   Objective:    Observation:    Supine in bed. Family visiting. Pt cooperative but overall limited activity tolerance. RUE swelling. Oriented to person, month, year, and place. Treatment, including education/measures:  Supine to sit: maxA x 2 for bilat LE advancement and uprighting trunk. Inc time and cues needed for sequencing. Very little ability to advance LEs on his own or rotate trunk to functionally use bed rail to assist him. HOB elevated ~45 deg  Sitting balance: CGA regressing to modA with left lateral lean. Very fwd posture with rounded shoulders and downward gaze. Kept eyes closed most of the time. Global fatigue noted with pt becoming less conversational needing to lie back down after ~6 minutes at EOB. Set up assist of BUEs on bed for support with dec functional use of RUE (swelling and painful)  Sit to supine:maxA x 2 for bilat LE advancement and trunk guidance   Rolling: maxA bilat directions needing initiation and heavy facilitation to complete. MaxA to maintain side lying. Educated pt on POC, role of PT, discharge. Cues provided for sequencing to inc safety and indep with mobility     Assessment / Impression:    Patient's tolerance of treatment:  fair   Adverse Reaction: no  Significant change in status and impact:  no  Barriers to improvement:  activity tolerance, strength, balance, pain, prolonged hospital stay with dec mobility   Pt had cystoscopy, left ureteral stent exchange, and suprapubic ftube exchange on .    Plan for Next Session:    Activity tolerance, strength, balance, transfers, bed mobility   Time in:  1338  Time out:  1401  Timed treatment minutes:  23   Total treatment time:  23 minutes     Previously filed items:  Social/Functional History  Lives With: Son  Home Equipment: Rollator        Short Term Goals  Time Frame for Short term goals: 2 weeks  Short term goal 1: Pt will perform sit><supine maxA  Short term goal 2: Pt will perform static sitting x 5 minutes SBA with BUE support  Short term goal 3: Pt will transfer between surfaces maxA    Electronically signed by:    Juan Taylor [id number]  9/8/2022, 3:11 PM

## 2022-09-09 ENCOUNTER — APPOINTMENT (OUTPATIENT)
Dept: GENERAL RADIOLOGY | Age: 82
DRG: 853 | End: 2022-09-09
Payer: MEDICARE

## 2022-09-09 ENCOUNTER — APPOINTMENT (OUTPATIENT)
Dept: ULTRASOUND IMAGING | Age: 82
DRG: 853 | End: 2022-09-09
Payer: MEDICARE

## 2022-09-09 LAB
ANION GAP SERPL CALCULATED.3IONS-SCNC: 12 MMOL/L (ref 4–16)
BUN BLDV-MCNC: 27 MG/DL (ref 6–23)
CALCIUM SERPL-MCNC: 8.9 MG/DL (ref 8.3–10.6)
CHLORIDE BLD-SCNC: 104 MMOL/L (ref 99–110)
CO2: 27 MMOL/L (ref 21–32)
CREAT SERPL-MCNC: 2.1 MG/DL (ref 0.9–1.3)
CULTURE: ABNORMAL
CULTURE: ABNORMAL
DIFFERENTIAL TYPE: ABNORMAL
EOSINOPHILS ABSOLUTE: 0.2 K/CU MM
EOSINOPHILS RELATIVE PERCENT: 1 % (ref 0–3)
GFR AFRICAN AMERICAN: 37 ML/MIN/1.73M2
GFR NON-AFRICAN AMERICAN: 30 ML/MIN/1.73M2
GLUCOSE BLD-MCNC: 116 MG/DL (ref 70–99)
GLUCOSE BLD-MCNC: 118 MG/DL (ref 70–99)
GLUCOSE BLD-MCNC: 128 MG/DL (ref 70–99)
GLUCOSE BLD-MCNC: 132 MG/DL (ref 70–99)
GLUCOSE BLD-MCNC: 136 MG/DL (ref 70–99)
HCT VFR BLD CALC: 27 % (ref 42–52)
HEMOGLOBIN: 8.5 GM/DL (ref 13.5–18)
HIGH SENSITIVE C-REACTIVE PROTEIN: >300 MG/L (ref 0–5)
LACTATE: 1 MMOL/L (ref 0.4–2)
LYMPHOCYTES ABSOLUTE: 1.2 K/CU MM
LYMPHOCYTES RELATIVE PERCENT: 5 % (ref 24–44)
Lab: ABNORMAL
MCH RBC QN AUTO: 29.8 PG (ref 27–31)
MCHC RBC AUTO-ENTMCNC: 31.5 % (ref 32–36)
MCV RBC AUTO: 94.7 FL (ref 78–100)
MONOCYTES ABSOLUTE: 0.7 K/CU MM
MONOCYTES RELATIVE PERCENT: 3 % (ref 0–4)
PDW BLD-RTO: 14.7 % (ref 11.7–14.9)
PLATELET # BLD: 419 K/CU MM (ref 140–440)
PMV BLD AUTO: 10.5 FL (ref 7.5–11.1)
POTASSIUM SERPL-SCNC: 4.4 MMOL/L (ref 3.5–5.1)
PROCALCITONIN: 3.77
RBC # BLD: 2.85 M/CU MM (ref 4.6–6.2)
SEGMENTED NEUTROPHILS ABSOLUTE COUNT: 21.5 K/CU MM
SEGMENTED NEUTROPHILS RELATIVE PERCENT: 91 % (ref 36–66)
SODIUM BLD-SCNC: 143 MMOL/L (ref 135–145)
SPECIMEN: ABNORMAL
WBC # BLD: 23.6 K/CU MM (ref 4–10.5)

## 2022-09-09 PROCEDURE — 2580000003 HC RX 258: Performed by: STUDENT IN AN ORGANIZED HEALTH CARE EDUCATION/TRAINING PROGRAM

## 2022-09-09 PROCEDURE — 71045 X-RAY EXAM CHEST 1 VIEW: CPT

## 2022-09-09 PROCEDURE — 6360000002 HC RX W HCPCS: Performed by: STUDENT IN AN ORGANIZED HEALTH CARE EDUCATION/TRAINING PROGRAM

## 2022-09-09 PROCEDURE — 2100000000 HC CCU R&B

## 2022-09-09 PROCEDURE — 6370000000 HC RX 637 (ALT 250 FOR IP): Performed by: SPECIALIST

## 2022-09-09 PROCEDURE — C1892 INTRO/SHEATH,FIXED,PEEL-AWAY: HCPCS

## 2022-09-09 PROCEDURE — 99233 SBSQ HOSP IP/OBS HIGH 50: CPT | Performed by: INTERNAL MEDICINE

## 2022-09-09 PROCEDURE — 6360000002 HC RX W HCPCS: Performed by: INTERNAL MEDICINE

## 2022-09-09 PROCEDURE — 80048 BASIC METABOLIC PNL TOTAL CA: CPT

## 2022-09-09 PROCEDURE — 85027 COMPLETE CBC AUTOMATED: CPT

## 2022-09-09 PROCEDURE — 76937 US GUIDE VASCULAR ACCESS: CPT

## 2022-09-09 PROCEDURE — 2709999900 HC NON-CHARGEABLE SUPPLY

## 2022-09-09 PROCEDURE — 94761 N-INVAS EAR/PLS OXIMETRY MLT: CPT

## 2022-09-09 PROCEDURE — C1769 GUIDE WIRE: HCPCS

## 2022-09-09 PROCEDURE — 2580000003 HC RX 258: Performed by: INTERNAL MEDICINE

## 2022-09-09 PROCEDURE — 82962 GLUCOSE BLOOD TEST: CPT

## 2022-09-09 PROCEDURE — 2580000003 HC RX 258: Performed by: SPECIALIST

## 2022-09-09 PROCEDURE — 2580000003 HC RX 258: Performed by: ANESTHESIOLOGY

## 2022-09-09 PROCEDURE — 6360000002 HC RX W HCPCS: Performed by: SPECIALIST

## 2022-09-09 PROCEDURE — 2060000000 HC ICU INTERMEDIATE R&B

## 2022-09-09 PROCEDURE — 93971 EXTREMITY STUDY: CPT

## 2022-09-09 PROCEDURE — 84156 ASSAY OF PROTEIN URINE: CPT

## 2022-09-09 PROCEDURE — 87186 SC STD MICRODIL/AGAR DIL: CPT

## 2022-09-09 PROCEDURE — 83605 ASSAY OF LACTIC ACID: CPT

## 2022-09-09 PROCEDURE — 02HV33Z INSERTION OF INFUSION DEVICE INTO SUPERIOR VENA CAVA, PERCUTANEOUS APPROACH: ICD-10-PCS | Performed by: STUDENT IN AN ORGANIZED HEALTH CARE EDUCATION/TRAINING PROGRAM

## 2022-09-09 PROCEDURE — 1200000000 HC SEMI PRIVATE

## 2022-09-09 PROCEDURE — 86141 C-REACTIVE PROTEIN HS: CPT

## 2022-09-09 PROCEDURE — 36415 COLL VENOUS BLD VENIPUNCTURE: CPT

## 2022-09-09 PROCEDURE — C1751 CATH, INF, PER/CENT/MIDLINE: HCPCS

## 2022-09-09 PROCEDURE — 84145 PROCALCITONIN (PCT): CPT

## 2022-09-09 PROCEDURE — 81001 URINALYSIS AUTO W/SCOPE: CPT

## 2022-09-09 PROCEDURE — 85007 BL SMEAR W/DIFF WBC COUNT: CPT

## 2022-09-09 PROCEDURE — 87040 BLOOD CULTURE FOR BACTERIA: CPT

## 2022-09-09 RX ORDER — SODIUM CHLORIDE 9 MG/ML
INJECTION, SOLUTION INTRAVENOUS CONTINUOUS
Status: DISCONTINUED | OUTPATIENT
Start: 2022-09-09 | End: 2022-09-09

## 2022-09-09 RX ORDER — 0.9 % SODIUM CHLORIDE 0.9 %
30 INTRAVENOUS SOLUTION INTRAVENOUS ONCE
Status: COMPLETED | OUTPATIENT
Start: 2022-09-09 | End: 2022-09-09

## 2022-09-09 RX ORDER — LIDOCAINE HYDROCHLORIDE 10 MG/ML
5 INJECTION, SOLUTION EPIDURAL; INFILTRATION; INTRACAUDAL; PERINEURAL ONCE
Status: DISCONTINUED | OUTPATIENT
Start: 2022-09-09 | End: 2022-09-19

## 2022-09-09 RX ORDER — FLUCONAZOLE 2 MG/ML
400 INJECTION, SOLUTION INTRAVENOUS EVERY 24 HOURS
Status: DISCONTINUED | OUTPATIENT
Start: 2022-09-09 | End: 2022-09-12

## 2022-09-09 RX ORDER — SODIUM CHLORIDE 0.9 % (FLUSH) 0.9 %
5-40 SYRINGE (ML) INJECTION EVERY 12 HOURS SCHEDULED
Status: DISCONTINUED | OUTPATIENT
Start: 2022-09-09 | End: 2022-09-21 | Stop reason: HOSPADM

## 2022-09-09 RX ORDER — SODIUM CHLORIDE 0.9 % (FLUSH) 0.9 %
5-40 SYRINGE (ML) INJECTION PRN
Status: DISCONTINUED | OUTPATIENT
Start: 2022-09-09 | End: 2022-09-21 | Stop reason: HOSPADM

## 2022-09-09 RX ORDER — SODIUM CHLORIDE 9 MG/ML
INJECTION, SOLUTION INTRAVENOUS PRN
Status: DISCONTINUED | OUTPATIENT
Start: 2022-09-09 | End: 2022-09-21 | Stop reason: HOSPADM

## 2022-09-09 RX ORDER — SODIUM CHLORIDE 9 MG/ML
INJECTION, SOLUTION INTRAVENOUS CONTINUOUS
Status: DISCONTINUED | OUTPATIENT
Start: 2022-09-09 | End: 2022-09-10

## 2022-09-09 RX ADMIN — POLYETHYLENE GLYCOL (3350) 17 G: 17 POWDER, FOR SOLUTION ORAL at 09:01

## 2022-09-09 RX ADMIN — DOCUSATE SODIUM 100 MG: 100 CAPSULE, LIQUID FILLED ORAL at 19:46

## 2022-09-09 RX ADMIN — AMLODIPINE BESYLATE 10 MG: 10 TABLET ORAL at 09:02

## 2022-09-09 RX ADMIN — POLYETHYLENE GLYCOL (3350) 17 G: 17 POWDER, FOR SOLUTION ORAL at 19:46

## 2022-09-09 RX ADMIN — BISACODYL 10 MG: 10 SUPPOSITORY RECTAL at 09:02

## 2022-09-09 RX ADMIN — MEROPENEM 1000 MG: 1 INJECTION, POWDER, FOR SOLUTION INTRAVENOUS at 01:45

## 2022-09-09 RX ADMIN — SODIUM CHLORIDE 1000 ML: 9 INJECTION, SOLUTION INTRAVENOUS at 09:26

## 2022-09-09 RX ADMIN — Medication 10 ML: at 19:47

## 2022-09-09 RX ADMIN — LEVOTHYROXINE SODIUM 75 MCG: 75 TABLET ORAL at 05:09

## 2022-09-09 RX ADMIN — CARVEDILOL 6.25 MG: 6.25 TABLET, FILM COATED ORAL at 17:59

## 2022-09-09 RX ADMIN — CEFIDEROCOL SULFATE TOSYLATE 1500 MG: 1 INJECTION, POWDER, FOR SOLUTION INTRAVENOUS at 12:44

## 2022-09-09 RX ADMIN — CARVEDILOL 6.25 MG: 6.25 TABLET, FILM COATED ORAL at 09:02

## 2022-09-09 RX ADMIN — SODIUM CHLORIDE: 9 INJECTION, SOLUTION INTRAVENOUS at 14:08

## 2022-09-09 RX ADMIN — VANCOMYCIN HYDROCHLORIDE 1750 MG: 10 INJECTION, POWDER, LYOPHILIZED, FOR SOLUTION INTRAVENOUS at 15:14

## 2022-09-09 RX ADMIN — Medication 10 ML: at 09:06

## 2022-09-09 RX ADMIN — Medication 10 MG: at 19:45

## 2022-09-09 RX ADMIN — PREGABALIN 50 MG: 50 CAPSULE ORAL at 09:02

## 2022-09-09 RX ADMIN — ACETAMINOPHEN 650 MG: 325 TABLET ORAL at 19:46

## 2022-09-09 RX ADMIN — FLUCONAZOLE 400 MG: 2 INJECTION, SOLUTION INTRAVENOUS at 14:03

## 2022-09-09 RX ADMIN — PANTOPRAZOLE SODIUM 40 MG: 40 TABLET, DELAYED RELEASE ORAL at 05:09

## 2022-09-09 RX ADMIN — SODIUM CHLORIDE: 9 INJECTION, SOLUTION INTRAVENOUS at 09:12

## 2022-09-09 RX ADMIN — FERROUS SULFATE TAB 325 MG (65 MG ELEMENTAL FE) 325 MG: 325 (65 FE) TAB at 09:02

## 2022-09-09 RX ADMIN — ATORVASTATIN CALCIUM 80 MG: 40 TABLET, FILM COATED ORAL at 19:46

## 2022-09-09 RX ADMIN — ENOXAPARIN SODIUM 40 MG: 100 INJECTION SUBCUTANEOUS at 17:59

## 2022-09-09 RX ADMIN — PREGABALIN 50 MG: 50 CAPSULE ORAL at 19:46

## 2022-09-09 RX ADMIN — DOCUSATE SODIUM 100 MG: 100 CAPSULE, LIQUID FILLED ORAL at 09:02

## 2022-09-09 RX ADMIN — ASPIRIN 81 MG CHEWABLE TABLET 81 MG: 81 TABLET CHEWABLE at 09:02

## 2022-09-09 RX ADMIN — FERROUS SULFATE TAB 325 MG (65 MG ELEMENTAL FE) 325 MG: 325 (65 FE) TAB at 17:59

## 2022-09-09 ASSESSMENT — PAIN DESCRIPTION - LOCATION
LOCATION: HEAD
LOCATION: HEAD

## 2022-09-09 ASSESSMENT — PAIN SCALES - GENERAL
PAINLEVEL_OUTOF10: 2
PAINLEVEL_OUTOF10: 2

## 2022-09-09 NOTE — PROGRESS NOTES
[facility] [address]   Progress Note  [facility] 0 1 2      Date: 2022   Patient: Jimmy Campa   : 1940   DOA: 2022   MRN: [id number]   ROOM#: [id number]     Admit Date: 2022     Collaborating Urologist on Call at time of admission: Dr. Gabriela Maldonado  CC: Fatigue  Reason for Consult: Hematuria with acute blood loss anemia  POD #2: Cystoscopy, left ureteral stent exchange, suprapubic tube exchange. Subjective:     Pain: mild, nausea and no vomiting,   Bowel Movement/Flatus:   Yes  Voiding:  SPT in place, urine dark red    Pt resting in bed, states he is feeling OK today. Still fatigued. Urine dark red, appears to be old blood. I manually irrigated his bladder with 200cc NS and had several small-medium clots returned. Urine clear by end of irrigation. Objective:    Vitals:    BP (!) 153/67   Pulse 83   Temp 100.3 °F (37.9 °C) (Oral)   Resp 16   Ht 5' 8\" (1.727 m)   Wt 213 lb 13.5 oz (97 kg)   SpO2 (!) 88%   BMI 32.52 kg/m²    Temp  Av.4 °F (38 °C)  Min: 98.7 °F (37.1 °C)  Max: 102.3 °F (39.1 °C)     Intake/Output Summary (Last 24 hours) at 2022 4698  Last data filed at 2022 0816  Gross per 24 hour   Intake 10 ml   Output 250 ml   Net -240 ml       Physical Exam:  General appearance: alert, appears stated age, cooperative, fatigued, no distress, mildly obese and slowed mentation  Head: Normocephalic, without obvious abnormality, atraumatic  Back: No CVA tenderness  Abdomen: Soft, non-tender, non-distended.  SPT in place, urine dark red    Labs:   WBC:    Lab Results   Component Value Date/Time    WBC 23.6 2022 04:08 AM      Hemoglobin/Hematocrit:    Lab Results   Component Value Date/Time    HGB 8.5 2022 04:08 AM    HCT 27.0 2022 04:08 AM      BMP:   Lab Results   Component Value Date/Time     2022 04:08 AM    K 4.4 2022 04:08 AM    K 3.9 2018 04:42 AM     2022 04:08 AM    CO2 27 2022 04:08 AM BUN 27 09/09/2022 04:08 AM    LABALBU 2.8 09/08/2022 06:16 PM    CREATININE 2.1 09/09/2022 04:08 AM    CALCIUM 8.9 09/09/2022 04:08 AM    GFRAA 37 09/09/2022 04:08 AM    LABGLOM 30 09/09/2022 04:08 AM      PT/INR:    Lab Results   Component Value Date/Time    PROTIME 12.5 04/18/2022 09:51 AM    PROTIME 15.2 01/24/2011 06:48 PM    INR 0.97 04/18/2022 09:51 AM      PTT:    Lab Results   Component Value Date/Time    APTT 33.6 04/18/2022 09:51 AM     Blood Culture: 4/4 bottles +ESBL E.coli  Urine Culture: >50K CFU/ml mixed growth    Imaging:   CT ABDOMEN PELVIS WO CONTRAST Additional Contrast? None    Result Date: 9/1/2022  EXAMINATION: CT OF THE ABDOMEN AND PELVIS WITHOUT CONTRAST 9/1/2022 3:06 pm TECHNIQUE: CT of the abdomen and pelvis was performed without the administration of intravenous contrast. Multiplanar reformatted images are provided for review. Automated exposure control, iterative reconstruction, and/or weight based adjustment of the mA/kV was utilized to reduce the radiation dose to as low as reasonably achievable. COMPARISON: 04/18/2022. HISTORY: ORDERING SYSTEM PROVIDED HISTORY: abdominal pain TECHNOLOGIST PROVIDED HISTORY: Reason for exam:->abdominal pain Additional Contrast?->None Reason for Exam: abdominal pain, POSSIBLE UTI FINDINGS: Lower Chest: The visualized lung bases demonstrate subsegmental atelectasis/scarring. Trace pericardial fluid. Questionable trace bilateral pleural fluid. Atherosclerosis in the visualized thoracic aorta. Coronary artery calcifications. Small hiatal hernia. Liver: Normal. Gallbladder and Bile Ducts: Normal. Spleen: Normal. Adrenal Glands: Normal. Pancreas: Normal. Genitourinary: Left ureteral stent with minimal dilatation of the left urinary tract. Mild dilatation of the right urinary tract. Mild dilatation of the right urinary tract with increased right perinephric and periureteral stranding. No definite urinary stones.   Suprapubic catheter in the decompressed urinary bladder. Bowel: Normal caliber bowel. Normal appendix. No significant diverticular disease. Vasculature: Atherosclerosis. Normal caliber abdominal aorta. Bones and Soft Tissues: Fat containing right inguinal hernia. Degenerative changes in the visualized spine and pelvis. Retroperitoneum/Mesentery: No intraperitoneal free air, ascites or fluid collection. No lymphadenopathy in the abdomen or pelvis. Stranding/fluid throughout the retroperitoneum. 1.  Left ureteral stent in place. Mild bilateral urinary tract dilatation with new right perinephric and periureteral stranding raises concern for urinary tract infection. No urinary stones. 2.  Suprapubic catheter in the decompressed urinary bladder. 3.  Ill-defined stranding/fluid throughout the retroperitoneum may relate to vascular congestion or reactive changes. No intraperitoneal free air or abscess. FL LESS THAN 1 HOUR    Result Date: 9/7/2022  Radiology exam is complete. No Radiologist dictation. Please follow up with ordering provider. XR CHEST PORTABLE    Result Date: 9/6/2022  EXAMINATION: ONE XRAY VIEW OF THE CHEST 9/6/2022 1:57 pm COMPARISON: 08/28/2022 HISTORY: ORDERING SYSTEM PROVIDED HISTORY: cough TECHNOLOGIST PROVIDED HISTORY: Reason for exam:->cough Reason for Exam: cough FINDINGS: The lungs appear clear. There are no pulmonary nodules, masses or infiltrates. There are no pleural effusions and there is no evidence of pneumothorax. The heart and mediastinal structures appear normal.  Bony structures appear unremarkable. No abnormalities noted. XR CHEST PORTABLE    Result Date: 8/28/2022  EXAMINATION: ONE XRAY VIEW OF THE CHEST 8/28/2022 3:37 pm COMPARISON: 04/18/2022 HISTORY: ORDERING SYSTEM PROVIDED HISTORY: emergency TECHNOLOGIST PROVIDED HISTORY: Reason for exam:->emergency Reason for Exam: fatigue, emergency, fever FINDINGS: Increased interstitial opacities seen throughout both lungs.   A focal infiltrate is not identified. The cardial pericardial silhouette is unremarkable. No pneumothorax is seen. No free air. No acute bony abnormality. Increased interstitial opacity. While much or all of this may be chronic, please correlate with any clinical evidence of acute interstitial edema. US ABDOMEN LIMITED Specify organ? LIVER, GALLBLADDER    Result Date: 9/3/2022  EXAMINATION: RIGHT UPPER QUADRANT ULTRASOUND 9/3/2022 4:43 am COMPARISON: CT 09/01/2022, ultrasound 05/06/2020 HISTORY: ORDERING SYSTEM PROVIDED HISTORY: abdominal pain TECHNOLOGIST PROVIDED HISTORY: Reason for exam:->abdominal pain Specify organ?->LIVER Specify organ?->GALLBLADDER FINDINGS: LIVER:  The liver demonstrates normal echogenicity without evidence of intrahepatic biliary ductal dilatation. BILIARY SYSTEM:  Gallbladder is unremarkable without evidence of pericholecystic fluid, wall thickening or stones. Negative sonographic Lima's sign. Common bile duct is within normal limits measuring 3 mm. RIGHT KIDNEY: The right kidney is grossly unremarkable without evidence of hydronephrosis. PANCREAS:  Visualized portions of the pancreas are unremarkable. OTHER: No evidence of right upper quadrant ascites. Unremarkable right upper quadrant ultrasound. Assessment & Plan:      Aftab Ada is a 78 y.o. male admitted 7/21/2050 for metabolic encephalopathy. 1) Chronic Urinary Retention: managed with suprapubic catheter and exchanged monthly, last exchanged upon admission 8/28/22. Recommend SPT exchanges q3 weeks. 2) Chronic Left Hydronephrosis: S/p cystoscopy, left ureteral stent exchange, suprapubic tube exchange. S/p cystoscopy, left ureteral stent exchange 6/29/22. S/p cystoscopy, left ureteral stent placement 7/4/21              CT a/p 9/1/22: Left ureteral stent in place. Mild bilateral urinary tract dilatation with new right perinephric and periureteral stranding raises concern for urinary tract infection. No urinary stones. Suprapubic catheter in the decompressed urinary bladder. Recommend left ureteral stent exchanges q3 months. Gross hematuria not uncommon after stent exchanges. RN staff to manually irrigate bladder q4hrs and prn clots. 3) Sepsis w Complicated UTI: urine cx 8/29/22 >50K CFU/ml mixed growth, likely contaminated. Blood cultures 4/4 +ESBL E.coli              WBC 23.6, T-max 102.3 overnight              On IV Merrem; ID following              Recommend irrigate SP w saline 100 ml daily. Repeat urine culture pending. CT a/p ordered due to recent fevers/worsening leukocytosis. 4) H/o Prostate Cancer: S/p RPP with Dr. Jayy Patton in [address] [place] [address] PSA 0.93 2/2021. On Eligard q6 months     Will follow. Patient seen and examined, chart reviewed.      Electronically signed by Alfred Rodriguez PA-C on 9/9/2022 at 9:07 AM

## 2022-09-09 NOTE — PLAN OF CARE
Problem: Chronic Conditions and Co-morbidities  Goal: Patient's chronic conditions and co-morbidity symptoms are monitored and maintained or improved  9/9/2022 1216 by Dai Colunga  Outcome: Not Progressing  9/9/2022 0619 by Terry Bains RN  Outcome: Progressing     Problem: Nutrition Deficit:  Goal: Optimize nutritional status  Outcome: Not Progressing

## 2022-09-09 NOTE — CONSULTS
Patient:   Yoselyn Dominic    Date:  22  :  1940, 78 y.o. Nephrologist: Scarlet Bingham MD  Provider: Solo Pace MD    Reason for Consult: Acute kidney injury with underlying CKD stage III    Consult requested by : Dr Charu Morrison MD    Chief Complaint:   Fatigue and confusion    HISTORY OF PRESENT ILLNESS:   Mr. Shad Hills, is an 78-year-old male, who was originally brought to the emergency department on 2022, with fatigue and confusion. He was hemodynamically stable but work-up suggested genitourinary infection, as he has left ureteral indwelling stent. He was admitted and treated appropriately with antimicrobial agent. At the time of admission, his creatinine was 1.7, initially in the emergency department, he was given 30 mill per kilo of normal saline, appropriate body fluid culture were drawn, empirical antimicrobial agent mainly for  gram-negative colton were initiated, and he was subsequently admitted for further evaluation. He was also seen by urology service, his hospital course consistent with increasing creatinine to 2.1 recently he become hypotensive necessitating stepdown transfer. He is currently receiving fluid bolus followed by IV fluid. And planning to go have his stent exchange. He was also seen by infectious disease and currently on cefiderocol, fluconazole. Microbiologic studies so far showed urine culture positive for Candida albicans, and blood culture positive for extended spectrum beta-lactamase E. coli. I am familiar with him, I first met him in 2022. When he sustained an acute kidney injury from urosepsis, he also underwent cystoscopy, ureteroscopy and left ureteral stent placement, etiology of his recurrent left ureteral obstruction is unknown. He sustained acute kidney injury at the time, IP creatinine 3.4. In general his creatinine was 0.9 mg/dL back in , remains off until . He still increased to 1.6.   But in 2020 was running about 2 weeks, until he sustained an acute kidney injury in 2022. I saw him recently (2022) in my office, at that time creatinine was 1.7, I wanted to introduce ACE inhibitor's, and add torsemide because of peripheral edema. PMH :   1.  CKD stage IIIb-several acute kidney injury in the past  2. Diabetes mellitus type 2-apparently diagnosed circa   3. Prostate cancer diagnosed in , underwent surgical resection and chemotherapy  4. Recent left hydronephrosis with stent placement  5. Dyslipidemia           PSH :  1.  Ureteral stent placement-  2. Prostate surgery  3. Apparently also had colon surgery and bladder surgery           Habits :   Quit smoking in   Has only 10-pack-year history  Is a social drinker  No history of illicit drug abuse     Soc Hx:  He was born and raised in [place], but has been residing in [facility] for the last 50+ years, he is a retired [employer] employee, he was  for 58 years but unfortunately his wife  2021, is currently residing here in town with his son . His activity somehow down going lately     [facility]   Apparently his son had end-stage kidney disease was on dialysis for 1 year and has transplant now-he is unsure the etiology of kidney disease         REVIEW OF SYSTEMS:     All pertinent ROS neg except   Confusion, lethargy, hypertension    Medication :     Reviewed, see in epic    BP (!) 157/61   Pulse 88   Temp 98.3 °F (36.8 °C) (Oral)   Resp 19   Ht 5' 8\" (1.727 m)   Wt 213 lb 13.5 oz (97 kg)   SpO2 95%   BMI 32.52 kg/m²     PHYSICAL EXAM:  General appearance: He was alert awake but little lethargic and confused  HEENT: At least 3+ conjunctival pallor  Neck: Supple  Heart: Seems regular rate and rhythm  LUNGS: Positive admission breath sound  Abdomen: Soft, tender on palpation  Extremities:  At least 2+ edema  Suprapubic catheter    LABS:  Reviewed, see in epic            IMPRESSION:  Acute kidney injury with underlying CKD stage [id number] from septic shock-he also may have renal vein congestion-also could be either tubular injury or acute interstitial nephritis from antimicrobial agent  and another medication  Septic shock with gram-negative colton-he will go for urological intervention today with appropriate antimicrobial agent  Probable fluid overload  Underlying diabetes, history of prostate cancer and ureteral obstruction    PLAN:    Start with UA and urinary indicis  proBNP level tomorrow  May need diuretics by tomorrow  If the proBNP level is high, I would stop IV fluid and start diuretics, if remain hypotensive ,may need vasopressor therapy  Avoid any nonessential medication.   Follow clinically and biochemically

## 2022-09-09 NOTE — CONSULTS
[address] [facility]  consulted by Dr. Rosa Jade for monitoring and adjustment. Indication for treatment: Sepsis of unknown etiology  Goal trough: [] 10-15 mcg/mL or [x] 15-20 mcg/ml  AUC/USMAN: [] <500 or [x] 400-600    Pertinent Laboratory Values:   Temp Readings from Last 3 Encounters:   09/09/22 100.3 °F (37.9 °C) (Oral)   05/25/22 97.2 °F (36.2 °C) (Infrared)   04/22/22 97.5 °F (36.4 °C) (Axillary)     Recent Labs     09/07/22  0634 09/08/22  1816 09/09/22  0408   WBC 10.4 17.9* 23.6*     Recent Labs     09/07/22  0634 09/08/22  1816 09/09/22  0408   BUN 24* 26* 27*   CREATININE 1.8* 1.9* 2.1*     Estimated Creatinine Clearance: 30 mL/min (A) (based on SCr of 2.1 mg/dL (H)). Intake/Output Summary (Last 24 hours) at 9/9/2022 0958  Last data filed at 9/9/2022 0816  Gross per 24 hour   Intake --   Output 250 ml   Net -250 ml       Pertinent Cultures:  Date    Source    Results  9/9   Blood    Ordered  9/9   Urine    Ordered    Vancomycin level:   TROUGH:  No results for input(s): VANCOTROUGH in the last 72 hours. RANDOM:  No results for input(s): VANCORANDOM in the last 72 hours. Assessment:  SCr, BUN, and urine output: PAMELLA on CKD3, SCR trending up to 2.1 today (baseline 1.6), UOP appears ok  Day(s) of therapy: 1 of 7 (last dose on 9/16)  Vancomycin concentration:   9/10 - random at 06:00    Plan:  Due to PAMELLA on CKD3, start intermittent dosing based on vanco levels and renal trends  Give vancomycin 1750mg ivpb x1 dose today  Check the vanco level tomorrow am  Pharmacy will continue to monitor patient and adjust therapy as indicated    VANCOMYCIN CONCENTRATION SCHEDULED FOR 9/10 @06:00    Thank you for the consult. Angelica Hardin, [facility]  9/9/2022 9:58 AM

## 2022-09-09 NOTE — PROGRESS NOTES
Progress Note( Dr. Merlin Grave)  9/9/2022  Subjective:   Admit Date: 8/28/2022  PCP: Tere Bauer MD    Admitted For :Fatigue and possible sepsis from UTI    Consulted For: Control of blood glucose as patient has having hypoglycemic episodes    Interval History: feeling Somewhat  tired  had  no more episode of low blood glucose yesterday  Pt had one episode of vomiting last night     Pt had Following Urology procedure done early morng hour spf 9/8/2022    Cystoscopy, left ureteral stent exchange,  suprapubic tube exchange. Denies any chest pains,   Denies SOB . Denies nausea or vomiting. No new bowel or bladder symptoms. Intake/Output Summary (Last 24 hours) at 9/9/2022 1741  Last data filed at 9/9/2022 0816  Gross per 24 hour   Intake --   Output 250 ml   Net -250 ml         DATA    CBC:   Recent Labs     09/07/22  0634 09/08/22  1816 09/09/22  0408   WBC 10.4 17.9* 23.6*   HGB 7.0* 7.6* 8.5*    397 419      CMP:  Recent Labs     09/07/22  0634 09/08/22  1816 09/09/22  0408    137 143   K 4.3 4.3 4.4    102 104   CO2 25 22 27   BUN 24* 26* 27*   CREATININE 1.8* 1.9* 2.1*   CALCIUM 8.4 8.4 8.9   PROT 5.4* 5.3*  --    LABALBU 2.7* 2.8*  --    BILITOT 0.3 0.3  --    ALKPHOS 89 97  --    AST 15 12*  --    ALT 12 6*  --        Lipids: No results found for: CHOL, HDL, TRIG  Glucose:  Recent Labs     09/09/22  0821 09/09/22  0827 09/09/22  1223   POCGLU 132* 136* 128*       [id number]:  Lab Results   Component Value Date/Time    LABA1C 7.8 08/29/2022 01:30 AM     High Sensitivity TSH:   Lab Results   Component Value Date/Time    TSHHS 7.590 04/28/2022 06:59 AM     Free T3: No results found for: FT3  Free T4:  Lab Results   Component Value Date/Time    T4FREE 1.15 03/27/2022 08:22 AM       VL DUP UPPER EXTREMITY VENOUS RIGHT   Final Result   No evidence of DVT within the right upper extremity. XR CHEST PORTABLE   Final Result   No acute process.          XR ABDOMEN (KUB) (SINGLE AP VIEW)   Final Result   Prominent loops of bowel may represent persistent ileus or partial   obstruction. FL LESS THAN 1 HOUR   Final Result      XR CHEST PORTABLE   Final Result   No abnormalities noted. US ABDOMEN LIMITED Specify organ? LIVER, GALLBLADDER   Final Result   Unremarkable right upper quadrant ultrasound. CT ABDOMEN PELVIS WO CONTRAST Additional Contrast? None   Final Result   1. Left ureteral stent in place. Mild bilateral urinary tract dilatation   with new right perinephric and periureteral stranding raises concern for   urinary tract infection. No urinary stones. 2.  Suprapubic catheter in the decompressed urinary bladder. 3.  Ill-defined stranding/fluid throughout the retroperitoneum may relate to   vascular congestion or reactive changes. No intraperitoneal free air or   abscess. XR CHEST PORTABLE   Final Result   Increased interstitial opacity. While much or all of this may be chronic,   please correlate with any clinical evidence of acute interstitial edema.          CT ABDOMEN PELVIS WO CONTRAST Additional Contrast? None    (Results Pending)        Scheduled Medicines   Medications:    fluconazole  400 mg IntraVENous Q24H    [START ON 9/10/2022] vancomycin (VANCOCIN) intermittent dosing (placeholder)   Other RX Placeholder    Cefiderocol Sulfate Tosylate  1,500 mg IntraVENous Q8H    lidocaine PF  5 mL IntraDERmal Once    sodium chloride flush  5-40 mL IntraVENous 2 times per day    sodium chloride flush  5-40 mL IntraVENous 2 times per day    meperidine  12.5 mg IntraVENous Once    ferrous sulfate  325 mg Oral BID WC    carvedilol  6.25 mg Oral BID     lidocaine  1 patch TransDERmal Daily    insulin lispro  0-16 Units SubCUTAneous TID     insulin lispro  0-4 Units SubCUTAneous 2 times per day    insulin glargine  20 Units SubCUTAneous Nightly    amLODIPine  10 mg Oral Daily    aspirin  81 mg Oral Daily    atorvastatin  80 mg Oral Nightly levothyroxine  75 mcg Oral Daily    docusate sodium  100 mg Oral BID    bisacodyl  10 mg Rectal Daily    melatonin  10 mg Oral Nightly    pantoprazole  40 mg Oral QAM AC    polyethylene glycol  17 g Oral BID    pregabalin  50 mg Oral BID    sodium chloride flush  5-40 mL IntraVENous 2 times per day    enoxaparin  40 mg SubCUTAneous QPM      Infusions:    sodium chloride 75 mL/hr at 09/09/22 1408    sodium chloride      sodium chloride      dextrose      sodium chloride 25 mL (09/06/22 5767)    dextrose           Objective:   Vitals: BP (!) 157/61   Pulse 88   Temp 98.3 °F (36.8 °C) (Oral)   Resp 19   Ht 5' 8\" (1.727 m)   Wt 213 lb 13.5 oz (97 kg)   SpO2 95%   BMI 32.52 kg/m²   General appearance: alert and cooperative with exam  Neck: no JVD or bruit  Thyroid : Normal lobes   Lungs: Has Vesicular Breath sounds   Heart:  regular rate and rhythm  Abdomen: soft, non-tender; bowel sounds normal; no masses,  no organomegaly has suprapubic catheter   Musculoskeletal: Normal  Extremities: extremities normal, , no edema  Neurologic:  Awake, alert, oriented to name, place and time. Cranial nerves II-XII are grossly intact. Motor weakness. Sensory neuropathy. ,  and gait is abnormal.  Stable    Assessment:     Patient Active Problem List:     Gait disturbance     Generalized weakness     Diabetes mellitus (HCC)     Osteoarthritis     Anemia of chronic disorder     Infected implanted bladder sphincter cuff     Escherichia coli urinary tract infection     Hypertension     Sepsis ([address]     Acute encephalopathy     Type 2 diabetes mellitus with hypoglycemia without coma (HCC)     UTI (urinary tract infection) due to urinary indwelling catheter ([facility])     Hyperkalemia     Acute kidney failure (HCC)     Leg weakness, bilateral     Type 2 diabetes mellitus with neurological manifestations, uncontrolled ([address]     Complicated UTI (urinary tract infection)     Essential hypertension     Severe muscle deconditioning 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 (HCC)     Frequent falls     Chronic kidney disease, stage 3a ([address]     Uncontrolled type 2 diabetes mellitus with peripheral neuropathy ([address]     Prostate cancer ([address]     Suprapubic catheter ([facility]     Sepsis due to urinary tract infection ([facility]     Coagulase negative Staphylococcus bacteremia     Chronic kidney disease, stage IV (severe) (HCC)     Acute metabolic encephalopathy     SVT (supraventricular tachycardia) (HCC)     Metabolic encephalopathy     History of prostate cancer     Cigarette nicotine dependence without complication     Altered mental status     Serratia marcescens infection     Hypoglycemia      Plan:     Reviewed POC blood glucose . Labs and X ray results   Reviewed Current Medicines   On Correction bolus Humalog/ Basal Lantus Insulin regime /   Monitor Blood glucose frequently   Modified  the dose of Insulin/ other medicines as neede  Management is working to show the patient to skilled nursing unit possibly [facility] home  Will follow     .      Timmothy Schlatter, MD, MD

## 2022-09-09 NOTE — PROGRESS NOTES
V2.0  [facility] [place] Hospitalist Progress Note      Name:  Della Prado /Age/Sex: 1940  (78 y.o. male)   MRN & CSN:  [id number] & [id number] Encounter Date/Time: 2022 1:46 PM EDT    Location:  -A PCP: Dean Freed MD       Hospital Day: 13    Assessment and Plan:   Della Prado is a 78 y.o. male with pmh of prostate cancer, diabetes mellitus, hypertension who admitted with sciatic found out with Complicated UTI (urinary tract infection)    Interval events: Patient overnight developed temperature of 102.3, associated with severe generalized weakness and fatigue, his labs from yesterday evening and this morning shows leukocytosis, he is not tachycardic or tachypneic, SIRS 2/4 also patient right upper extremity grossly swollen  -Given IV fluids with sepsis bolus and continued IV at 75 mill per hour  -Obtained panculture blood culture, urine culture  -Obtain lactic acid which showed 1.0, Pro-Vinh 3.77, CRP greater than 300  -Obtain chest x-ray with no acute process  -Obtain Doppler right upper extremity ultrasound which is negative for DVT  -Discussed with Dr. Mayela Carrasco ID consultant and added vancomycin and fluconazole to meropenem  -Transfer to stepdown unit for higher level of care    # ESBL bacteremia: Secondary to UTI  # Complicated UTI  - Urine culture on  growing E. coli resistant to Cipro and cefepime  - Urine culture on  no significant growth  - CT abdomen/pelvis showing left ureteral stent in place, mild bilateral urinary tract dilatation with new right perinephric and periureteral stranding raises concern for UTI  - as above    # Hematuria s/p cystoscopy and exchange of stent: Still urine in the bag looks bloody  - Urology following recommended to manually irrigate bladder every 4 hours and as needed clots  - Monitor H&H    # Normocytic anemia no sign of any active bleeding suspected secondary to medication versus  - Anemia work-up with occult blood, ferritin, iron panel, B12/folate  - Continue with H&H  -Transfuse if hemoglobin less than 7    # History of prostate cancer status post prostatectomy 1996  # Chronic urinary retention s/p suprapubic catheter and exchange every 3 weeks per urology     # Hypertension: Blood pressure stable continue on current medication     # Hypothyroidism continue levothyroxine     # CKD  - Avoid nephrotoxic      # Type 2 diabetes: Continue SSI, hypoglycemic protocol diabetic diet      Diet ADULT DIET; Regular; 4 carb choices (60 gm/meal)  ADULT ORAL NUTRITION SUPPLEMENT; Breakfast, Dinner; Low Calorie/High Protein Oral Supplement   DVT Prophylaxis [x] Lovenox, []  Heparin, [] SCDs, [] Ambulation,  [] Eliquis, [] Xarelto  [] Coumadin   Code Status Full Code   Disposition From: Home  Expected Disposition: Home versus SNF  Estimated Date of Discharge: TBD  Patient requires continued admission due to IV antibiotics and hematuria   Surrogate Decision Maker/ POA Son     Subjective:     Chief Complaint: Fatigue (General weakness started this am. Family called because pt had changed from baseline. Currently being tx for UTI. Pt is slightly confused, doesn't know the year. He has been N/V. )     Patient seen and examined at bedside,Patient overnight developed temperature of 102.3, associated with severe generalized weakness and fatigue, his labs from yesterday evening and this morning shows leukocytosis, he is not tachycardic or tachypneic, SIRS 2/4,. Review of Systems:    Review of Systems    Constitutional: fever +, generalized weakness, no chills  HEENT: No headache no dizziness  Cardiovascular: No chest pain no palpitations  Respiratory: No cough no shortness of breath  Abdomen: No diarrhea, no nausea, no vomiting, no abdominal pain  Musculoskeletal: No swelling  Neuro: No numbness or tingling  Skin: No rash      Objective:      Intake/Output Summary (Last 24 hours) at 9/9/2022 1533  Last data filed at 9/9/2022 0816  Gross per 24 hour   Intake -- Output 250 ml   Net -250 ml          Vitals:   Vitals:    09/09/22 1059   BP: (!) 141/56   Pulse: 84   Resp: 20   Temp: 99.6 °F (37.6 °C)   SpO2: 95%       Physical Exam:     General: febrile, looks weak and ill, no distress  Eyes: EOMI  ENT: neck supple  Cardiovascular: S1-S2 normal no murmur  Respiratory: Air entry good bilaterally no wheezing no crackles  Gastrointestinal: Soft, non tender  Genitourinary: no suprapubic tenderness but urinary bag with bloody urine  Musculoskeletal: Swollen right upper extremity  Skin: warm, dry  Neuro: Alert. Psych: Mood appropriate.      Medications:   Medications:    fluconazole  400 mg IntraVENous Q24H    vancomycin  1,750 mg IntraVENous Once    [START ON 9/10/2022] vancomycin (VANCOCIN) intermittent dosing (placeholder)   Other RX Placeholder    Cefiderocol Sulfate Tosylate  1,500 mg IntraVENous Q8H    sodium chloride flush  5-40 mL IntraVENous 2 times per day    meperidine  12.5 mg IntraVENous Once    ferrous sulfate  325 mg Oral BID WC    carvedilol  6.25 mg Oral BID WC    lidocaine  1 patch TransDERmal Daily    insulin lispro  0-16 Units SubCUTAneous TID WC    insulin lispro  0-4 Units SubCUTAneous 2 times per day    insulin glargine  20 Units SubCUTAneous Nightly    amLODIPine  10 mg Oral Daily    aspirin  81 mg Oral Daily    atorvastatin  80 mg Oral Nightly    levothyroxine  75 mcg Oral Daily    docusate sodium  100 mg Oral BID    bisacodyl  10 mg Rectal Daily    melatonin  10 mg Oral Nightly    pantoprazole  40 mg Oral QAM AC    polyethylene glycol  17 g Oral BID    pregabalin  50 mg Oral BID    sodium chloride flush  5-40 mL IntraVENous 2 times per day    enoxaparin  40 mg SubCUTAneous QPM      Infusions:    sodium chloride 75 mL/hr at 09/09/22 1408    sodium chloride      dextrose      sodium chloride 25 mL (09/06/22 2327)    dextrose       PRN Meds: sodium chloride flush, 5-40 mL, PRN  sodium chloride, 25 mL, PRN  HYDROmorphone, 0.25 mg, Q5 Min PRN  fentanNYL, 50 mcg, Q5 Min PRN  hydrALAZINE, 10 mg, Q15 Min PRN  glucose, 4 tablet, PRN  dextrose bolus, 125 mL, PRN   Or  dextrose bolus, 250 mL, PRN  glucagon (rDNA), 1 mg, PRN  dextrose, , Continuous PRN  guaiFENesin-dextromethorphan, 5 mL, Q4H PRN  hydrALAZINE (APRESOLINE) ivpb, 10 mg, Q4H PRN  magnesium hydroxide, 30 mL, Daily PRN  sodium chloride flush, 5-40 mL, PRN  sodium chloride, , PRN  ondansetron, 4 mg, Q8H PRN   Or  ondansetron, 4 mg, Q6H PRN  aluminum & magnesium hydroxide-simethicone, 30 mL, Q6H PRN  acetaminophen, 650 mg, Q6H PRN   Or  acetaminophen, 650 mg, Q6H PRN  glucose, 4 tablet, PRN  dextrose bolus, 125 mL, PRN   Or  dextrose bolus, 250 mL, PRN  glucagon (rDNA), 1 mg, PRN  dextrose, , Continuous PRN  ipratropium-albuterol, 1 vial, BID PRN      Labs      Recent Results (from the past 24 hour(s))   POCT Glucose    Collection Time: 09/08/22  5:13 PM   Result Value Ref Range    POC Glucose 257 (H) 70 - 99 MG/DL   CBC with Auto Differential    Collection Time: 09/08/22  6:16 PM   Result Value Ref Range    WBC 17.9 (H) 4.0 - 10.5 K/CU MM    RBC 2.51 (L) 4.6 - 6.2 M/CU MM    Hemoglobin 7.6 (L) 13.5 - 18.0 GM/DL    Hematocrit 24.3 (L) 42 - 52 %    MCV 96.8 78 - 100 FL    MCH 30.3 27 - 31 PG    MCHC 31.3 (L) 32.0 - 36.0 %    RDW 14.8 11.7 - 14.9 %    Platelets 699 254 - 836 K/CU MM    MPV 10.0 7.5 - 11.1 FL    Differential Type AUTOMATED DIFFERENTIAL     Segs Relative 86.2 (H) 36 - 66 %    Lymphocytes % 6.3 (L) 24 - 44 %    Monocytes % 6.4 (H) 0 - 4 %    Eosinophils % 0.3 0 - 3 %    Basophils % 0.2 0 - 1 %    Segs Absolute 15.4 K/CU MM    Lymphocytes Absolute 1.1 K/CU MM    Monocytes Absolute 1.2 K/CU MM    Eosinophils Absolute 0.1 K/CU MM    Basophils Absolute 0.0 K/CU MM    Nucleated RBC % 0.0 %    Total Nucleated RBC 0.0 K/CU MM    Total Immature Neutrophil 0.10 K/CU MM    Immature Neutrophil % 0.6 (H) 0 - 0.43 %   Comprehensive Metabolic Panel w/ Reflex to MG    Collection Time: 09/08/22  6:16 PM   Result Value Ref Range    Sodium 137 135 - 145 MMOL/L    Potassium 4.3 3.5 - 5.1 MMOL/L    Chloride 102 99 - 110 mMol/L    CO2 22 21 - 32 MMOL/L    BUN 26 (H) 6 - 23 MG/DL    Creatinine 1.9 (H) 0.9 - 1.3 MG/DL    Glucose 262 (H) 70 - 99 MG/DL    Calcium 8.4 8.3 - 10.6 MG/DL    Albumin 2.8 (L) 3.4 - 5.0 GM/DL    Total Protein 5.3 (L) 6.4 - 8.2 GM/DL    Total Bilirubin 0.3 0.0 - 1.0 MG/DL    ALT 6 (L) 10 - 40 U/L    AST 12 (L) 15 - 37 IU/L    Alkaline Phosphatase 97 40 - 128 IU/L    GFR Non- 34 (L) >60 mL/min/1.73m2    GFR  41 (L) >60 mL/min/1.73m2    Anion Gap 13 4 - 16   POCT Glucose    Collection Time: 09/08/22  9:26 PM   Result Value Ref Range    POC Glucose 184 (H) 70 - 99 MG/DL   POCT Glucose    Collection Time: 09/09/22  3:19 AM   Result Value Ref Range    POC Glucose 118 (H) 70 - 99 MG/DL   CBC with Auto Differential    Collection Time: 09/09/22  4:08 AM   Result Value Ref Range    WBC 23.6 (H) 4.0 - 10.5 K/CU MM    RBC 2.85 (L) 4.6 - 6.2 M/CU MM    Hemoglobin 8.5 (L) 13.5 - 18.0 GM/DL    Hematocrit 27.0 (L) 42 - 52 %    MCV 94.7 78 - 100 FL    MCH 29.8 27 - 31 PG    MCHC 31.5 (L) 32.0 - 36.0 %    RDW 14.7 11.7 - 14.9 %    Platelets 619 573 - 187 K/CU MM    MPV 10.5 7.5 - 11.1 FL    Segs Relative 91.0 (H) 36 - 66 %    Eosinophils % 1.0 0 - 3 %    Lymphocytes % 5.0 (L) 24 - 44 %    Monocytes % 3.0 0 - 4 %    Segs Absolute 21.5 K/CU MM    Eosinophils Absolute 0.2 K/CU MM    Lymphocytes Absolute 1.2 K/CU MM    Monocytes Absolute 0.7 K/CU MM    Differential Type AUTOMATED DIFFERENTIAL    Basic Metabolic Panel w/ Reflex to MG    Collection Time: 09/09/22  4:08 AM   Result Value Ref Range    Sodium 143 135 - 145 MMOL/L    Potassium 4.4 3.5 - 5.1 MMOL/L    Chloride 104 99 - 110 mMol/L    CO2 27 21 - 32 MMOL/L    Anion Gap 12 4 - 16    BUN 27 (H) 6 - 23 MG/DL    Creatinine 2.1 (H) 0.9 - 1.3 MG/DL    Glucose 116 (H) 70 - 99 MG/DL    Calcium 8.9 8.3 - 10.6 MG/DL    GFR Non- 30 (L) >60 mL/min/1.73m2    GFR  37 (L) >60 mL/min/1.73m2   POCT Glucose    Collection Time: 09/09/22  8:21 AM   Result Value Ref Range    POC Glucose 132 (H) 70 - 99 MG/DL   POCT Glucose    Collection Time: 09/09/22  8:27 AM   Result Value Ref Range    POC Glucose 136 (H) 70 - 99 MG/DL   Lactic Acid    Collection Time: 09/09/22  9:20 AM   Result Value Ref Range    Lactate 1.0 0.4 - 2.0 mMOL/L   Procalcitonin    Collection Time: 09/09/22  9:23 AM   Result Value Ref Range    Procalcitonin 3.77    C-Reactive Protein    Collection Time: 09/09/22  9:23 AM   Result Value Ref Range    CRP, High Sensitivity >300.0 (H) 0.0 - 5.0 mg/L   POCT Glucose    Collection Time: 09/09/22 12:23 PM   Result Value Ref Range    POC Glucose 128 (H) 70 - 99 MG/DL        Imaging/Diagnostics Last 24 Hours   No results found.     Electronically signed by Viki Quintana MD on 9/9/2022 at 3:33 PM

## 2022-09-09 NOTE — PROGRESS NOTES
Progress Note( Dr. Ji Goes)  9/8/2022  Subjective:   Admit Date: 8/28/2022  PCP: Carlos Arndt MD    Admitted For :Fatigue and possible sepsis from UTI    Consulted For: Control of blood glucose as patient has having hypoglycemic episodes    Interval History: feeling Somewhat better had  no more episode of low blood glucose yesterday    Pt had Following Urology procedure done early morng hour spf 9/8/2022     Cystoscopy, left ureteral stent exchange,  suprapubic tube exchange. Denies any chest pains,   Denies SOB . Denies nausea or vomiting. No new bowel or bladder symptoms. Intake/Output Summary (Last 24 hours) at 9/8/2022 2313  Last data filed at 9/8/2022 0939  Gross per 24 hour   Intake 150 ml   Output 800 ml   Net -650 ml         DATA    CBC:   Recent Labs     09/06/22  0405 09/07/22  0634 09/08/22  1816   WBC 8.1 10.4 17.9*   HGB 7.4* 7.0* 7.6*    336 397      CMP:  Recent Labs     09/06/22  0405 09/07/22  0634 09/08/22  1816    139 137   K 4.4 4.3 4.3    104 102   CO2 25 25 22   BUN 20 24* 26*   CREATININE 1.8* 1.8* 1.9*   CALCIUM 8.0* 8.4 8.4   PROT  --  5.4* 5.3*   LABALBU  --  2.7* 2.8*   BILITOT  --  0.3 0.3   ALKPHOS  --  89 97   AST  --  15 12*   ALT  --  12 6*       Lipids: No results found for: CHOL, HDL, TRIG  Glucose:  Recent Labs     09/08/22  1246 09/08/22  1713 09/08/22  2126   POCGLU 200* 257* 184*       [id number]:  Lab Results   Component Value Date/Time    LABA1C 7.8 08/29/2022 01:30 AM     High Sensitivity TSH:   Lab Results   Component Value Date/Time    TSHHS 7.590 04/28/2022 06:59 AM     Free T3: No results found for: FT3  Free T4:  Lab Results   Component Value Date/Time    T4FREE 1.15 03/27/2022 08:22 AM       XR ABDOMEN (KUB) (SINGLE AP VIEW)   Final Result   Prominent loops of bowel may represent persistent ileus or partial   obstruction. FL LESS THAN 1 HOUR   Final Result      XR CHEST PORTABLE   Final Result   No abnormalities noted. US ABDOMEN LIMITED Specify organ? LIVER, GALLBLADDER   Final Result   Unremarkable right upper quadrant ultrasound. CT ABDOMEN PELVIS WO CONTRAST Additional Contrast? None   Final Result   1. Left ureteral stent in place. Mild bilateral urinary tract dilatation   with new right perinephric and periureteral stranding raises concern for   urinary tract infection. No urinary stones. 2.  Suprapubic catheter in the decompressed urinary bladder. 3.  Ill-defined stranding/fluid throughout the retroperitoneum may relate to   vascular congestion or reactive changes. No intraperitoneal free air or   abscess. XR CHEST PORTABLE   Final Result   Increased interstitial opacity. While much or all of this may be chronic,   please correlate with any clinical evidence of acute interstitial edema.               Scheduled Medicines   Medications:    meropenem  1,000 mg IntraVENous Q12H    sodium chloride flush  5-40 mL IntraVENous 2 times per day    meperidine  12.5 mg IntraVENous Once    ferrous sulfate  325 mg Oral BID WC    carvedilol  6.25 mg Oral BID WC    lidocaine  1 patch TransDERmal Daily    insulin lispro  0-16 Units SubCUTAneous TID WC    insulin lispro  0-4 Units SubCUTAneous 2 times per day    insulin glargine  20 Units SubCUTAneous Nightly    amLODIPine  10 mg Oral Daily    aspirin  81 mg Oral Daily    atorvastatin  80 mg Oral Nightly    levothyroxine  75 mcg Oral Daily    docusate sodium  100 mg Oral BID    bisacodyl  10 mg Rectal Daily    melatonin  10 mg Oral Nightly    pantoprazole  40 mg Oral QAM AC    polyethylene glycol  17 g Oral BID    pregabalin  50 mg Oral BID    sodium chloride flush  5-40 mL IntraVENous 2 times per day    enoxaparin  40 mg SubCUTAneous QPM      Infusions:    sodium chloride      dextrose      sodium chloride 25 mL (09/06/22 5137)    dextrose           Objective:   Vitals: BP (!) 170/80   Pulse 81   Temp (!) 102.3 °F (39.1 °C) (Oral)   Resp 15   Ht 5' 8\" (1.727 m)   Wt 213 lb 13.5 oz (97 kg)   SpO2 97%   BMI 32.52 kg/m²   General appearance: alert and cooperative with exam  Neck: no JVD or bruit  Thyroid : Normal lobes   Lungs: Has Vesicular Breath sounds   Heart:  regular rate and rhythm  Abdomen: soft, non-tender; bowel sounds normal; no masses,  no organomegaly has suprapubic catheter   Musculoskeletal: Normal  Extremities: extremities normal, , no edema  Neurologic:  Awake, alert, oriented to name, place and time. Cranial nerves II-XII are grossly intact. Motor weakness. Sensory neuropathy. ,  and gait is abnormal.  Stable    Assessment:     Patient Active Problem List:     Gait disturbance     Generalized weakness     Diabetes mellitus (HCC)     Osteoarthritis     Anemia of chronic disorder     Infected implanted bladder sphincter cuff     Escherichia coli urinary tract infection     Hypertension     Sepsis ([address]     Acute encephalopathy     Type 2 diabetes mellitus with hypoglycemia without coma (HCC)     UTI (urinary tract infection) due to urinary indwelling catheter (HCC)     Hyperkalemia     Acute kidney failure (HCC)     Leg weakness, bilateral     Type 2 diabetes mellitus with neurological manifestations, uncontrolled ([address]     Complicated UTI (urinary tract infection)     Essential hypertension     Severe muscle deconditioning     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 (HCC)     Frequent falls     Chronic kidney disease, stage 3a ([address]     Uncontrolled type 2 diabetes mellitus with peripheral neuropathy (HCC)     Prostate cancer (HCC)     Suprapubic catheter ([address]     Sepsis due to urinary tract infection ([address]     Coagulase negative Staphylococcus bacteremia     Chronic kidney disease, stage IV (severe) (HCC)     Acute metabolic encephalopathy     SVT (supraventricular tachycardia) (HCC)     Metabolic encephalopathy     History of prostate cancer     Cigarette nicotine dependence without complication     Altered mental status     Serratia marcescens infection     Hypoglycemia      Plan:     Reviewed POC blood glucose . Labs and X ray results   Reviewed Current Medicines   On Correction bolus Humalog/ Basal Lantus Insulin regime /   Monitor Blood glucose frequently   Modified  the dose of Insulin/ other medicines as neede  Management is working to show the patient to skilled nursing unit possibly [facility]  Will follow     .      Francia Galvez MD, MD

## 2022-09-09 NOTE — CONSULTS
Consult in progress. PICC contraindicated r/t Hx of CKDstage4 and current Creat =2.1; per [facility], pt requires Nephrology consult & OK for PICC or MidLine access. Chris, Primary RN, notified.

## 2022-09-09 NOTE — PROGRESS NOTES
Infectious Disease Progress Note  2022   Patient Name: Flora Mathis : 1940     Assessment  Sepsis secondary to ESBL E. coli pyelonephritis associated with suprapubic cystostomy. History of prostate cancer status post RPP. ESBL E coli bacteremia. S/p cystoscopy, left ureteral stent exchange and peritoneal mccrary catheter exchange on 2022. Fever has worsened, CRP and pct is elevated. raises question of persistent ESBL bacteremia, CRE, fungal UTI and pneumonia. Hematuria: post procedure  ?ileus  CKD stage IIIB  T2DM  Diabetic neuropathy     Plan  Therapeutic: d/c meropenem 1 g q 12 hours . Start [facility]. D/w Dr. Alonzo Montgomery and agree with repeating blood cx and CXR. Advised to start vancomycin and fluconazole  Diagnostic: MRSA nares,   F/u: CT abdomen and pelvis wo contrast, blood cx, MRSA nares, urine cx  Other:     Reason for visit: F/u ESBL E coli bacteremia and left pyelonephritis? History:? Interval history noted  Denies n/v/d/f or untoward effects of antimicrobials  Physical Exam:  Vital Signs: BP (!) 153/67   Pulse 83   Temp 100.3 °F (37.9 °C) (Oral)   Resp 16   Ht 5' 8\" (1.727 m)   Wt 213 lb 13.5 oz (97 kg)   SpO2 (!) 88%   BMI 32.52 kg/m²     Gen: alert and oriented X3, no distress  Skin: no stigmata of endocarditis  Wounds: C/D/I  HEMT: AT/NC Oropharynx pink, moist, and without lesions or exudates; dentition in good state of repair  Eyes: PERRLA, EOMI, conjunctiva pink, sclera anicteric. Neck: Supple. Trachea midline. No LAD. Chest: no distress and CTA. Good air movement. Heart: RRR and no MRG. Abd: suprapubic catheter, soft, gaseous distension, no tenderness, no hepatomegaly. Normoactive bowel sounds. Ext: no clubbing, cyanosis, or edema  Catheter Site: without erythema or tenderness  LDA: CVC:  Neuro: Mental status intact. CN 2-12 intact and no focal sensory or motor deficits     Radiologic / Imaging / TESTING  No results found.      Labs:    Recent Results (from the past 24 hour(s))   POCT Glucose    Collection Time: 09/08/22 12:46 PM   Result Value Ref Range    POC Glucose 200 (H) 70 - 99 MG/DL   POCT Glucose    Collection Time: 09/08/22  5:13 PM   Result Value Ref Range    POC Glucose 257 (H) 70 - 99 MG/DL   CBC with Auto Differential    Collection Time: 09/08/22  6:16 PM   Result Value Ref Range    WBC 17.9 (H) 4.0 - 10.5 K/CU MM    RBC 2.51 (L) 4.6 - 6.2 M/CU MM    Hemoglobin 7.6 (L) 13.5 - 18.0 GM/DL    Hematocrit 24.3 (L) 42 - 52 %    MCV 96.8 78 - 100 FL    MCH 30.3 27 - 31 PG    MCHC 31.3 (L) 32.0 - 36.0 %    RDW 14.8 11.7 - 14.9 %    Platelets 717 008 - 876 K/CU MM    MPV 10.0 7.5 - 11.1 FL    Differential Type AUTOMATED DIFFERENTIAL     Segs Relative 86.2 (H) 36 - 66 %    Lymphocytes % 6.3 (L) 24 - 44 %    Monocytes % 6.4 (H) 0 - 4 %    Eosinophils % 0.3 0 - 3 %    Basophils % 0.2 0 - 1 %    Segs Absolute 15.4 K/CU MM    Lymphocytes Absolute 1.1 K/CU MM    Monocytes Absolute 1.2 K/CU MM    Eosinophils Absolute 0.1 K/CU MM    Basophils Absolute 0.0 K/CU MM    Nucleated RBC % 0.0 %    Total Nucleated RBC 0.0 K/CU MM    Total Immature Neutrophil 0.10 K/CU MM    Immature Neutrophil % 0.6 (H) 0 - 0.43 %   Comprehensive Metabolic Panel w/ Reflex to MG    Collection Time: 09/08/22  6:16 PM   Result Value Ref Range    Sodium 137 135 - 145 MMOL/L    Potassium 4.3 3.5 - 5.1 MMOL/L    Chloride 102 99 - 110 mMol/L    CO2 22 21 - 32 MMOL/L    BUN 26 (H) 6 - 23 MG/DL    Creatinine 1.9 (H) 0.9 - 1.3 MG/DL    Glucose 262 (H) 70 - 99 MG/DL    Calcium 8.4 8.3 - 10.6 MG/DL    Albumin 2.8 (L) 3.4 - 5.0 GM/DL    Total Protein 5.3 (L) 6.4 - 8.2 GM/DL    Total Bilirubin 0.3 0.0 - 1.0 MG/DL    ALT 6 (L) 10 - 40 U/L    AST 12 (L) 15 - 37 IU/L    Alkaline Phosphatase 97 40 - 128 IU/L    GFR Non- 34 (L) >60 mL/min/1.73m2    GFR  41 (L) >60 mL/min/1.73m2    Anion Gap 13 4 - 16   POCT Glucose    Collection Time: 09/08/22  9:26 PM   Result Value Ref Range    POC Glucose 184 (H) 70 - 99 MG/DL   POCT Glucose    Collection Time: 09/09/22  3:19 AM   Result Value Ref Range    POC Glucose 118 (H) 70 - 99 MG/DL   CBC with Auto Differential    Collection Time: 09/09/22  4:08 AM   Result Value Ref Range    WBC 23.6 (H) 4.0 - 10.5 K/CU MM    RBC 2.85 (L) 4.6 - 6.2 M/CU MM    Hemoglobin 8.5 (L) 13.5 - 18.0 GM/DL    Hematocrit 27.0 (L) 42 - 52 %    MCV 94.7 78 - 100 FL    MCH 29.8 27 - 31 PG    MCHC 31.5 (L) 32.0 - 36.0 %    RDW 14.7 11.7 - 14.9 %    Platelets 101 843 - 233 K/CU MM    MPV 10.5 7.5 - 11.1 FL    Segs Relative 91.0 (H) 36 - 66 %    Eosinophils % 1.0 0 - 3 %    Lymphocytes % 5.0 (L) 24 - 44 %    Monocytes % 3.0 0 - 4 %    Segs Absolute 21.5 K/CU MM    Eosinophils Absolute 0.2 K/CU MM    Lymphocytes Absolute 1.2 K/CU MM    Monocytes Absolute 0.7 K/CU MM    Differential Type AUTOMATED DIFFERENTIAL    Basic Metabolic Panel w/ Reflex to MG    Collection Time: 09/09/22  4:08 AM   Result Value Ref Range    Sodium 143 135 - 145 MMOL/L    Potassium 4.4 3.5 - 5.1 MMOL/L    Chloride 104 99 - 110 mMol/L    CO2 27 21 - 32 MMOL/L    Anion Gap 12 4 - 16    BUN 27 (H) 6 - 23 MG/DL    Creatinine 2.1 (H) 0.9 - 1.3 MG/DL    Glucose 116 (H) 70 - 99 MG/DL    Calcium 8.9 8.3 - 10.6 MG/DL    GFR Non-African American 30 (L) >60 mL/min/1.73m2    GFR  37 (L) >60 mL/min/1.73m2   POCT Glucose    Collection Time: 09/09/22  8:21 AM   Result Value Ref Range    POC Glucose 132 (H) 70 - 99 MG/DL   POCT Glucose    Collection Time: 09/09/22  8:27 AM   Result Value Ref Range    POC Glucose 136 (H) 70 - 99 MG/DL   Lactic Acid    Collection Time: 09/09/22  9:20 AM   Result Value Ref Range    Lactate 1.0 0.4 - 2.0 mMOL/L     CULTURE results: Invalid input(s): BLOOD CULTURE,  URINE CULTURE, SURGICAL CULTURE    Diagnosis:  Patient Active Problem List   Diagnosis    Gait disturbance    Generalized weakness    Diabetes mellitus (HCC)    Osteoarthritis    Anemia of chronic disorder    Infected implanted bladder sphincter cuff    Escherichia coli urinary tract infection    Hypertension    Sepsis ([address]    Acute encephalopathy    Type 2 diabetes mellitus with hypoglycemia without coma ([address]    UTI (urinary tract infection) due to urinary indwelling catheter (HCC)    Hyperkalemia    Acute kidney failure (HCC)    Leg weakness, bilateral    Type 2 diabetes mellitus with neurological manifestations, uncontrolled ([address]    Complicated UTI (urinary tract infection)    Essential hypertension    Severe muscle deconditioning    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 (HCC)    Frequent falls    Chronic kidney disease, stage 3a ([address]    Uncontrolled type 2 diabetes mellitus with peripheral neuropathy (HCC)    Prostate cancer (HCC)    Suprapubic catheter ([address]    Sepsis due to urinary tract infection ([address]    Coagulase negative Staphylococcus bacteremia    Chronic kidney disease, stage IV (severe) (HCC)    Acute metabolic encephalopathy    SVT (supraventricular tachycardia) (HCC)    Metabolic encephalopathy    History of prostate cancer    Cigarette nicotine dependence without complication    Altered mental status    Serratia marcescens infection    Hypoglycemia       Active Problems  Principal Problem:    Complicated UTI (urinary tract infection)  Resolved Problems:    * No resolved hospital problems.  *              Electronically signed by: Electronically signed by Edna Holder MD on 9/9/2022 at 10:13 AM

## 2022-09-09 NOTE — CONSULTS
PHARMACY CONSULT FOR RENAL DOSING OF FETROJA PER DR HAMILTON    INDICATION:  possible CRE    RENAL LAB EVALUATION  Estimated Creatinine Clearance: 30 mL/min (A) (based on SCr of 2.1 mg/dL (H)). Recent Labs     09/07/22  0634 09/08/22  1816 09/09/22  0408   BUN 24* 26* 27*   CREATININE 1.8* 1.9* 2.1*     PAMELLA ON CKD3 (BASELINE SCR ~1.6)    Start Fetroja 1.5gm ivpb q8h (each dose to be infused over 3 hours)    Pharmacy will monitor renal trends daily and will adjust the Fetroja dose as needed. Elle Patino, [facility]  9/9/2022  10:26 AM

## 2022-09-09 NOTE — CONSULTS
Possible lymphedema in the right arm. US negative for DVT but Midline needs to be removed. Patient must have good access due to multiple infusions. Possible lymphedema could occur in the left arm after PICC is placed. This nurse notified Dr. Jeff Coy about this issue. Dr. Jeff Coy stated the benefits out weigh the risks in this situation, place the PICC line.

## 2022-09-10 ENCOUNTER — APPOINTMENT (OUTPATIENT)
Dept: CT IMAGING | Age: 82
DRG: 853 | End: 2022-09-10
Payer: MEDICARE

## 2022-09-10 ENCOUNTER — APPOINTMENT (OUTPATIENT)
Dept: GENERAL RADIOLOGY | Age: 82
DRG: 853 | End: 2022-09-10
Payer: MEDICARE

## 2022-09-10 LAB
ALBUMIN SERPL-MCNC: 2.2 GM/DL (ref 3.4–5)
ALP BLD-CCNC: 84 IU/L (ref 40–128)
ALT SERPL-CCNC: <5 U/L (ref 10–40)
ANION GAP SERPL CALCULATED.3IONS-SCNC: 8 MMOL/L (ref 4–16)
AST SERPL-CCNC: 15 IU/L (ref 15–37)
BASOPHILS ABSOLUTE: 0.1 K/CU MM
BASOPHILS RELATIVE PERCENT: 0.5 % (ref 0–1)
BILIRUB SERPL-MCNC: 0.2 MG/DL (ref 0–1)
BUN BLDV-MCNC: 28 MG/DL (ref 6–23)
CALCIUM SERPL-MCNC: 7.9 MG/DL (ref 8.3–10.6)
CHLORIDE BLD-SCNC: 106 MMOL/L (ref 99–110)
CO2: 25 MMOL/L (ref 21–32)
CREAT SERPL-MCNC: 2.8 MG/DL (ref 0.9–1.3)
DIFFERENTIAL TYPE: ABNORMAL
DOSE AMOUNT: NORMAL
DOSE TIME: NORMAL
EOSINOPHILS ABSOLUTE: 0.3 K/CU MM
EOSINOPHILS RELATIVE PERCENT: 2.1 % (ref 0–3)
GFR AFRICAN AMERICAN: 26 ML/MIN/1.73M2
GFR NON-AFRICAN AMERICAN: 22 ML/MIN/1.73M2
GLUCOSE BLD-MCNC: 108 MG/DL (ref 70–99)
GLUCOSE BLD-MCNC: 175 MG/DL (ref 70–99)
GLUCOSE BLD-MCNC: 47 MG/DL (ref 70–99)
GLUCOSE BLD-MCNC: 58 MG/DL (ref 70–99)
GLUCOSE BLD-MCNC: 63 MG/DL (ref 70–99)
GLUCOSE BLD-MCNC: 72 MG/DL (ref 70–99)
GLUCOSE BLD-MCNC: 78 MG/DL (ref 70–99)
GLUCOSE BLD-MCNC: 91 MG/DL (ref 70–99)
HCT VFR BLD CALC: 18.6 % (ref 42–52)
HCT VFR BLD CALC: 22.8 % (ref 42–52)
HEMOGLOBIN: 6 GM/DL (ref 13.5–18)
HEMOGLOBIN: 7.3 GM/DL (ref 13.5–18)
IMMATURE NEUTROPHIL %: 0.7 % (ref 0–0.43)
LYMPHOCYTES ABSOLUTE: 0.9 K/CU MM
LYMPHOCYTES RELATIVE PERCENT: 6.8 % (ref 24–44)
MAGNESIUM: 1.7 MG/DL (ref 1.8–2.4)
MCH RBC QN AUTO: 30.2 PG (ref 27–31)
MCHC RBC AUTO-ENTMCNC: 32.3 % (ref 32–36)
MCV RBC AUTO: 93.5 FL (ref 78–100)
MONOCYTES ABSOLUTE: 0.9 K/CU MM
MONOCYTES RELATIVE PERCENT: 7.1 % (ref 0–4)
NUCLEATED RBC %: 0 %
PDW BLD-RTO: 15.1 % (ref 11.7–14.9)
PHOSPHORUS: 3 MG/DL (ref 2.5–4.9)
PLATELET # BLD: 335 K/CU MM (ref 140–440)
PMV BLD AUTO: 10 FL (ref 7.5–11.1)
POTASSIUM SERPL-SCNC: 3.8 MMOL/L (ref 3.5–5.1)
PRO-BNP: 3498 PG/ML
RBC # BLD: 1.99 M/CU MM (ref 4.6–6.2)
SEGMENTED NEUTROPHILS ABSOLUTE COUNT: 10.8 K/CU MM
SEGMENTED NEUTROPHILS RELATIVE PERCENT: 82.8 % (ref 36–66)
SODIUM BLD-SCNC: 139 MMOL/L (ref 135–145)
TOTAL IMMATURE NEUTOROPHIL: 0.09 K/CU MM
TOTAL NUCLEATED RBC: 0 K/CU MM
TOTAL PROTEIN: 5.5 GM/DL (ref 6.4–8.2)
VANCOMYCIN RANDOM: 15.7 UG/ML
WBC # BLD: 13.1 K/CU MM (ref 4–10.5)

## 2022-09-10 PROCEDURE — 74176 CT ABD & PELVIS W/O CONTRAST: CPT

## 2022-09-10 PROCEDURE — 6360000002 HC RX W HCPCS: Performed by: STUDENT IN AN ORGANIZED HEALTH CARE EDUCATION/TRAINING PROGRAM

## 2022-09-10 PROCEDURE — 6360000002 HC RX W HCPCS: Performed by: INTERNAL MEDICINE

## 2022-09-10 PROCEDURE — 82962 GLUCOSE BLOOD TEST: CPT

## 2022-09-10 PROCEDURE — P9016 RBC LEUKOCYTES REDUCED: HCPCS

## 2022-09-10 PROCEDURE — 71045 X-RAY EXAM CHEST 1 VIEW: CPT

## 2022-09-10 PROCEDURE — 2580000003 HC RX 258: Performed by: STUDENT IN AN ORGANIZED HEALTH CARE EDUCATION/TRAINING PROGRAM

## 2022-09-10 PROCEDURE — 85014 HEMATOCRIT: CPT

## 2022-09-10 PROCEDURE — 36556 INSERT NON-TUNNEL CV CATH: CPT

## 2022-09-10 PROCEDURE — 6370000000 HC RX 637 (ALT 250 FOR IP): Performed by: SPECIALIST

## 2022-09-10 PROCEDURE — 2580000003 HC RX 258: Performed by: INTERNAL MEDICINE

## 2022-09-10 PROCEDURE — 2060000000 HC ICU INTERMEDIATE R&B

## 2022-09-10 PROCEDURE — 80053 COMPREHEN METABOLIC PANEL: CPT

## 2022-09-10 PROCEDURE — 86900 BLOOD TYPING SEROLOGIC ABO: CPT

## 2022-09-10 PROCEDURE — 86850 RBC ANTIBODY SCREEN: CPT

## 2022-09-10 PROCEDURE — 36430 TRANSFUSION BLD/BLD COMPNT: CPT

## 2022-09-10 PROCEDURE — 86901 BLOOD TYPING SEROLOGIC RH(D): CPT

## 2022-09-10 PROCEDURE — 85018 HEMOGLOBIN: CPT

## 2022-09-10 PROCEDURE — 83735 ASSAY OF MAGNESIUM: CPT

## 2022-09-10 PROCEDURE — 2580000003 HC RX 258: Performed by: ANESTHESIOLOGY

## 2022-09-10 PROCEDURE — 83880 ASSAY OF NATRIURETIC PEPTIDE: CPT

## 2022-09-10 PROCEDURE — 85025 COMPLETE CBC W/AUTO DIFF WBC: CPT

## 2022-09-10 PROCEDURE — 2580000003 HC RX 258: Performed by: SPECIALIST

## 2022-09-10 PROCEDURE — 94761 N-INVAS EAR/PLS OXIMETRY MLT: CPT

## 2022-09-10 PROCEDURE — 86922 COMPATIBILITY TEST ANTIGLOB: CPT

## 2022-09-10 PROCEDURE — 84100 ASSAY OF PHOSPHORUS: CPT

## 2022-09-10 PROCEDURE — 80202 ASSAY OF VANCOMYCIN: CPT

## 2022-09-10 RX ORDER — INSULIN LISPRO 100 [IU]/ML
0-8 INJECTION, SOLUTION INTRAVENOUS; SUBCUTANEOUS
Status: DISCONTINUED | OUTPATIENT
Start: 2022-09-10 | End: 2022-09-21 | Stop reason: HOSPADM

## 2022-09-10 RX ORDER — FUROSEMIDE 10 MG/ML
80 INJECTION INTRAMUSCULAR; INTRAVENOUS 3 TIMES DAILY
Status: DISCONTINUED | OUTPATIENT
Start: 2022-09-10 | End: 2022-09-11

## 2022-09-10 RX ORDER — FUROSEMIDE 10 MG/ML
40 INJECTION INTRAMUSCULAR; INTRAVENOUS ONCE
Status: COMPLETED | OUTPATIENT
Start: 2022-09-10 | End: 2022-09-10

## 2022-09-10 RX ORDER — SODIUM CHLORIDE 9 MG/ML
INJECTION, SOLUTION INTRAVENOUS PRN
Status: DISCONTINUED | OUTPATIENT
Start: 2022-09-10 | End: 2022-09-19

## 2022-09-10 RX ORDER — DEXTROSE MONOHYDRATE 100 MG/ML
250 INJECTION, SOLUTION INTRAVENOUS CONTINUOUS
Status: DISCONTINUED | OUTPATIENT
Start: 2022-09-10 | End: 2022-09-11

## 2022-09-10 RX ADMIN — ASPIRIN 81 MG CHEWABLE TABLET 81 MG: 81 TABLET CHEWABLE at 08:54

## 2022-09-10 RX ADMIN — PANTOPRAZOLE SODIUM 40 MG: 40 TABLET, DELAYED RELEASE ORAL at 06:25

## 2022-09-10 RX ADMIN — DOCUSATE SODIUM 100 MG: 100 CAPSULE, LIQUID FILLED ORAL at 08:54

## 2022-09-10 RX ADMIN — FUROSEMIDE 80 MG: 10 INJECTION, SOLUTION INTRAMUSCULAR; INTRAVENOUS at 20:51

## 2022-09-10 RX ADMIN — FERROUS SULFATE TAB 325 MG (65 MG ELEMENTAL FE) 325 MG: 325 (65 FE) TAB at 08:54

## 2022-09-10 RX ADMIN — FLUCONAZOLE 400 MG: 2 INJECTION, SOLUTION INTRAVENOUS at 09:54

## 2022-09-10 RX ADMIN — PREGABALIN 50 MG: 50 CAPSULE ORAL at 08:54

## 2022-09-10 RX ADMIN — CEFIDEROCOL SULFATE TOSYLATE 1500 MG: 1 INJECTION, POWDER, FOR SOLUTION INTRAVENOUS at 09:31

## 2022-09-10 RX ADMIN — ATORVASTATIN CALCIUM 80 MG: 40 TABLET, FILM COATED ORAL at 20:52

## 2022-09-10 RX ADMIN — VANCOMYCIN HYDROCHLORIDE 1000 MG: 1 INJECTION, POWDER, LYOPHILIZED, FOR SOLUTION INTRAVENOUS at 15:32

## 2022-09-10 RX ADMIN — FUROSEMIDE 80 MG: 10 INJECTION, SOLUTION INTRAMUSCULAR; INTRAVENOUS at 16:48

## 2022-09-10 RX ADMIN — CEFIDEROCOL SULFATE TOSYLATE 1500 MG: 1 INJECTION, POWDER, FOR SOLUTION INTRAVENOUS at 01:30

## 2022-09-10 RX ADMIN — CARVEDILOL 6.25 MG: 6.25 TABLET, FILM COATED ORAL at 08:54

## 2022-09-10 RX ADMIN — Medication 10 ML: at 20:52

## 2022-09-10 RX ADMIN — Medication 10 MG: at 20:52

## 2022-09-10 RX ADMIN — DEXTROSE MONOHYDRATE 250 ML: 100 INJECTION, SOLUTION INTRAVENOUS at 12:26

## 2022-09-10 RX ADMIN — CARVEDILOL 6.25 MG: 6.25 TABLET, FILM COATED ORAL at 16:54

## 2022-09-10 RX ADMIN — FERROUS SULFATE TAB 325 MG (65 MG ELEMENTAL FE) 325 MG: 325 (65 FE) TAB at 16:54

## 2022-09-10 RX ADMIN — FUROSEMIDE 40 MG: 10 INJECTION, SOLUTION INTRAMUSCULAR; INTRAVENOUS at 08:53

## 2022-09-10 RX ADMIN — SODIUM CHLORIDE, PRESERVATIVE FREE 10 ML: 5 INJECTION INTRAVENOUS at 09:31

## 2022-09-10 RX ADMIN — LEVOTHYROXINE SODIUM 75 MCG: 75 TABLET ORAL at 06:25

## 2022-09-10 RX ADMIN — CEFIDEROCOL SULFATE TOSYLATE 1500 MG: 1 INJECTION, POWDER, FOR SOLUTION INTRAVENOUS at 17:36

## 2022-09-10 RX ADMIN — DOCUSATE SODIUM 100 MG: 100 CAPSULE, LIQUID FILLED ORAL at 20:52

## 2022-09-10 RX ADMIN — DEXTROSE MONOHYDRATE 125 ML: 100 INJECTION, SOLUTION INTRAVENOUS at 01:46

## 2022-09-10 RX ADMIN — AMLODIPINE BESYLATE 10 MG: 10 TABLET ORAL at 08:54

## 2022-09-10 RX ADMIN — SODIUM CHLORIDE, PRESERVATIVE FREE 10 ML: 5 INJECTION INTRAVENOUS at 20:52

## 2022-09-10 RX ADMIN — Medication 10 ML: at 09:31

## 2022-09-10 RX ADMIN — POLYETHYLENE GLYCOL (3350) 17 G: 17 POWDER, FOR SOLUTION ORAL at 20:52

## 2022-09-10 RX ADMIN — SODIUM CHLORIDE, PRESERVATIVE FREE 10 ML: 5 INJECTION INTRAVENOUS at 09:32

## 2022-09-10 RX ADMIN — DEXTROSE MONOHYDRATE 125 ML: 100 INJECTION, SOLUTION INTRAVENOUS at 09:13

## 2022-09-10 NOTE — PROGRESS NOTES
Nephrology Progress Note  9/10/2022 3:17 PM        Subjective:   Admit Date: 8/28/2022  PCP: Mirna Hunt MD    Interval History: Patient seen earlier today, this is a late entry  524 Dr. Rico Corrales, but does respond to verbal stimuli  Underwent ureteral stent exchange-on September 7, 2022  Low hemoglobin requiring transfusion    Diet: Unsure how much she is eating    ROS: Lethargic, oliguria, low hemoglobin-hematuria  Urine output recorded little more than 1 L for the last 24 hours  Fever yesterday 102.6  Acceptable blood pressure  Low blood sugar requiring D10      Data:     Current meds:    insulin lispro  0-8 Units SubCUTAneous TID WC    vancomycin  1,000 mg IntraVENous Once    fluconazole  400 mg IntraVENous Q24H    vancomycin (VANCOCIN) intermittent dosing (placeholder)   Other RX Placeholder    Cefiderocol Sulfate Tosylate  1,500 mg IntraVENous Q8H    lidocaine PF  5 mL IntraDERmal Once    sodium chloride flush  5-40 mL IntraVENous 2 times per day    sodium chloride flush  5-40 mL IntraVENous 2 times per day    meperidine  12.5 mg IntraVENous Once    ferrous sulfate  325 mg Oral BID WC    carvedilol  6.25 mg Oral BID WC    lidocaine  1 patch TransDERmal Daily    insulin lispro  0-4 Units SubCUTAneous 2 times per day    [Held by provider] insulin glargine  20 Units SubCUTAneous Nightly    amLODIPine  10 mg Oral Daily    aspirin  81 mg Oral Daily    atorvastatin  80 mg Oral Nightly    levothyroxine  75 mcg Oral Daily    docusate sodium  100 mg Oral BID    bisacodyl  10 mg Rectal Daily    melatonin  10 mg Oral Nightly    pantoprazole  40 mg Oral QAM AC    polyethylene glycol  17 g Oral BID    pregabalin  50 mg Oral BID    sodium chloride flush  5-40 mL IntraVENous 2 times per day    enoxaparin  40 mg SubCUTAneous QPM      sodium chloride      dextrose 250 mL (09/10/22 1226)    sodium chloride      sodium chloride      dextrose      sodium chloride 25 mL (09/06/22 0648)    dextrose           I/O last 3 completed shifts:   In: 100 [Other:100]  Out: 1025 [Urine:1025]    CBC:   Recent Labs     09/08/22  1816 09/09/22  0408 09/10/22  0532 09/10/22  1328   WBC 17.9* 23.6* 13.1*  --    HGB 7.6* 8.5* 6.0* 7.3*    419 335  --           Recent Labs     09/08/22  1816 09/09/22  0408 09/10/22  0450    143 139   K 4.3 4.4 3.8    104 106   CO2 22 27 25   BUN 26* 27* 28*   CREATININE 1.9* 2.1* 2.8*   GLUCOSE 262* 116* 72       Lab Results   Component Value Date    CALCIUM 7.9 (L) 09/10/2022    PHOS 3.0 09/10/2022       Objective:     Vitals: BP (!) 137/57   Pulse 74   Temp 99.5 °F (37.5 °C) (Oral)   Resp 14   Ht 5' 8\" (1.727 m)   Wt 213 lb 13.5 oz (97 kg)   SpO2 95%   BMI 32.52 kg/m² ,    General appearance: Alert, awake but lethargic  HEENT: Positive conjunctival pallor-head of the bed elevated 45 degrees  Neck: Right IJ central venous catheter  Lungs: Limited exam, positive admission breath sound  Heart: Regular  Abdomen: Soft, suprapubic catheter  Extremities: Positive edema      Problem List :         Impression :     Acute kidney injury on top of CKD stage III-nonoliguric-mainly from sepsis/left to make sure there is no obstruction with the hematuria, also renal vein congestion is a consideration-  Sepsis likely infected ureteral stent-with multiple organism  Probable fluid overload-with underlying diabetes, prostate cancer etc.  Hematuria with anemia-we will make sure there is no bladder obstruction    Recommendation/Plan  :     I do not see ua and urinary indicis  She is getting transfusion  IV loop-see whether it works  Watch for iatrogenic and nosocomial complication  Follow clinically and biochemically      Basilia Santizo MD MD

## 2022-09-10 NOTE — CONSULTS
[address] [facility]  consulted by Dr. Jose G Martínez for monitoring and adjustment. Indication for treatment: Sepsis of unknown etiology  Goal trough: [] 10-15 mcg/mL or [x] 15-20 mcg/ml  AUC/USMAN: [] <500 or [x] 400-600    Pertinent Laboratory Values:   Temp Readings from Last 3 Encounters:   09/10/22 99.8 °F (37.7 °C) (Oral)   05/25/22 97.2 °F (36.2 °C) (Infrared)   04/22/22 97.5 °F (36.4 °C) (Axillary)     Recent Labs     09/08/22 1816 09/09/22  0408 09/09/22  0920 09/10/22  0532   WBC 17.9* 23.6*  --  13.1*   LACTATE  --   --  1.0  --        Recent Labs     09/08/22 1816 09/09/22 0408 09/10/22  0450   BUN 26* 27* 28*   CREATININE 1.9* 2.1* 2.8*       Estimated Creatinine Clearance: 22 mL/min (A) (based on SCr of 2.8 mg/dL (H)). Intake/Output Summary (Last 24 hours) at 9/10/2022 1302  Last data filed at 9/10/2022 1235  Gross per 24 hour   Intake 130 ml   Output 775 ml   Net -645 ml         Pertinent Cultures:  Date    Source    Results  8/28   Blood    ESBL Ecoli (4/4)  8/30   Blood    ESBL Ecoli (1/4)  9/1   Blood    NGTD  9/9   Urine    C. Albicans, low col #    Vancomycin level:   TROUGH:  No results for input(s): VANCOTROUGH in the last 72 hours.   RANDOM:    Recent Labs     09/10/22  0450   VANCORANDOM 15.7       Assessment:  SCr, BUN, and urine output:   PAMELLA on CKD3, Scr with increase in trends, up to 2.8  Baseline Scr 1.6  +UOP  Day(s) of therapy: 2 of 7 (last dose on 9/16)  Vancomycin concentration:   9/10: 15.7, collected 13h post-dose, appropriate to re-dose    Plan:  Continue intermittent vancomycin dosing with PAMELLA and CKD  Based on level, re-dose with 1000 mg x1  Repeat next level on Monday AM (anticipate >24h interval)  Pharmacy will continue to monitor patient and adjust therapy as indicated    Nena 3 9/12 @ 0600    Thank you for the consult,  Adán Foote [facility], PharmD  9/10/2022 1:02 PM

## 2022-09-10 NOTE — PROGRESS NOTES
ESBL E. Coli  Repeat urine culture < 10 candida  Blood culture 9/1/2022 negative    Repeat CT Scan pending    WBC improved to 13    On Fetroja,  vancomycin and fluconazole  per ID    Gross hematuria present  Clear red without clots in tubing  Irrigated and aspirate SP tube - cleared quickly  Leakage from urethra stopped after SP tube irrigated      Natalia Virk is a 78 y.o. male admitted 0/12/[birth year]77 for metabolic encephalopathy. Degree of hematuria does not explain Hgb drop from yesterday. PRBC today     1) Chronic Urinary Retention: managed with suprapubic catheter and exchanged monthly, last exchanged 9/7/22. Recommend SPT exchanges q3 weeks. 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 Mild bilateral urinary tract dilatation with new right perinephric and periureteral stranding raises concern for urinary tract infection. No urinary stones. Suprapubic catheter in the decompressed urinary bladder. Recommend left ureteral stent exchanges q3 months. Gross hematuria not uncommon after stent exchanges. RN staff to manually irrigate bladder q4hrs and prn clots. Maybe clearing    3) Sepsis w Complicated UTI: urine cx 8/29/22 >50K CFU/ml mixed growth, likely contaminated. Blood cultures 4/4 +ESBL E.coli              WBC 13, T-max 102.6 overnight                      On Fetroja,  vancomycin and fluconazole  per ID       Recommend irrigate SP w saline 100 ml daily. CT a/p ordered due to recent fevers pending  4) H/o Prostate Cancer: S/p RPP with Dr. Olya Dominique in 54. PSA 0.93 2/2021.  On Eligard q6 months    Will follow

## 2022-09-10 NOTE — PROGRESS NOTES
V2.0  [facility] Hospitalist Progress Note      Name:  Michelet Woodruff /Age/Sex: 1940  (78 y.o. male)   MRN & CSN:  [id number] & [id number] Encounter Date/Time: 9/10/2022 1:46 PM EDT    Location:  -A PCP: Nathalie Campa MD       Hospital Day: 14    Assessment and Plan:   Michelet Woodruff is a 78 y.o. male with pmh of prostate cancer, diabetes mellitus, hypertension who admitted with sciatic found out with Complicated UTI (urinary tract infection)    Interval events: Patient has low-grade fever 99.6, blood pressure 130/59, blood sugar 50, creatinine 2.8, magnesium 1.7, BNP 3498, WBC 13.1, hemoglobin 6.0 this morning and patient got central line this morning  - Chest x-ray low lung volumes with bibasilar atelectasis but patient looks grossly swollen and edematous  - Getting 1 unit of packed red blood cells  - Getting 10% dextrose for hypoglycemia  - Gave Lasix 1 dose    # ESBL bacteremia: Secondary to UTI  # Complicated UTI  - Urine culture on  growing E. coli resistant to Cipro and cefepime  - Urine culture on  no significant growth  - CT abdomen/pelvis showing left ureteral stent in place, mild bilateral urinary tract dilatation with new right perinephric and periureteral stranding raises concern for UTI  - ID following continue with Fetroja, vancomycin and fluconazole per ID    # Hematuria s/p cystoscopy and exchange of stent: Still urine in the bag looks bloody  - Urology following recommended to manually irrigate bladder every 4 hours and as needed clots  - Monitor H&H    # Normocytic anemia no sign of any active bleeding suspected secondary to medication versus  - Anemia work-up with occult blood, ferritin, iron panel, B12/folate  - Continue with H&H  -Transfuse if hemoglobin less than 7    # History of prostate cancer status post prostatectomy   # Chronic urinary retention s/p suprapubic catheter and exchange every 3 weeks per urology     # Hypertension: Blood pressure stable continue on current medication     # Hypothyroidism continue levothyroxine     # CKD  - Avoid nephrotoxic      # Type 2 diabetes: Continue SSI, hypoglycemic protocol diabetic diet      Diet ADULT DIET; Regular; 4 carb choices (60 gm/meal)  ADULT ORAL NUTRITION SUPPLEMENT; Breakfast, Dinner; Low Calorie/High Protein Oral Supplement   DVT Prophylaxis [x] Lovenox, []  Heparin, [] SCDs, [] Ambulation,  [] Eliquis, [] Xarelto  [] Coumadin   Code Status Full Code   Disposition From: Home  Expected Disposition: Home versus SNF  Estimated Date of Discharge: TBD  Patient requires continued admission due to IV antibiotics and hematuria   Surrogate Decision Maker/ POA Son     Subjective:     Chief Complaint: Fatigue (General weakness started this am. Family called because pt had changed from baseline. Currently being tx for UTI. Pt is slightly confused, doesn't know the year. He has been N/V. )     Patient seen and examined at bedside, patient looks weak and edematous, urine output minimal with hematuria, denies any complaints      Review of Systems:    Review of Systems    Constitutional: fever +, generalized weakness, no chills  HEENT: No headache no dizziness  Cardiovascular: No chest pain no palpitations  Respiratory: No cough no shortness of breath  Abdomen: No diarrhea, no nausea, no vomiting, no abdominal pain  Musculoskeletal: No swelling  Neuro: No numbness or tingling  Skin: No rash      Objective: Intake/Output Summary (Last 24 hours) at 9/10/2022 1501  Last data filed at 9/10/2022 1245  Gross per 24 hour   Intake 337 ml   Output 775 ml   Net -438 ml        Vitals:   Vitals:    09/10/22 1245   BP: (!) 131/50   Pulse: 67   Resp: 14   Temp: 99.8 °F (37.7 °C)   SpO2: 97%       Physical Exam:     General: febrile, looks weak and ill and edematous, no distress  Eyes: EOMI  ENT: neck supple  Cardiovascular: S1-S2 normal no murmur  Respiratory:  Air entry good bilaterally no wheezing no crackles  Gastrointestinal: Soft, non tender  Genitourinary: no suprapubic tenderness but urinary bag with bloody urine  Musculoskeletal: Swollen right, left and lower upper extremity  Skin: warm, dry  Neuro: Alert. Psych: Mood appropriate.      Medications:   Medications:    insulin lispro  0-8 Units SubCUTAneous TID WC    vancomycin  1,000 mg IntraVENous Once    fluconazole  400 mg IntraVENous Q24H    vancomycin (VANCOCIN) intermittent dosing (placeholder)   Other RX Placeholder    Cefiderocol Sulfate Tosylate  1,500 mg IntraVENous Q8H    lidocaine PF  5 mL IntraDERmal Once    sodium chloride flush  5-40 mL IntraVENous 2 times per day    sodium chloride flush  5-40 mL IntraVENous 2 times per day    meperidine  12.5 mg IntraVENous Once    ferrous sulfate  325 mg Oral BID WC    carvedilol  6.25 mg Oral BID WC    lidocaine  1 patch TransDERmal Daily    insulin lispro  0-4 Units SubCUTAneous 2 times per day    [Held by provider] insulin glargine  20 Units SubCUTAneous Nightly    amLODIPine  10 mg Oral Daily    aspirin  81 mg Oral Daily    atorvastatin  80 mg Oral Nightly    levothyroxine  75 mcg Oral Daily    docusate sodium  100 mg Oral BID    bisacodyl  10 mg Rectal Daily    melatonin  10 mg Oral Nightly    pantoprazole  40 mg Oral QAM AC    polyethylene glycol  17 g Oral BID    pregabalin  50 mg Oral BID    sodium chloride flush  5-40 mL IntraVENous 2 times per day    enoxaparin  40 mg SubCUTAneous QPM      Infusions:    sodium chloride      dextrose 250 mL (09/10/22 1226)    sodium chloride      sodium chloride      dextrose      sodium chloride 25 mL (09/06/22 2327)    dextrose       PRN Meds: sodium chloride, , PRN  sodium chloride flush, 5-40 mL, PRN  sodium chloride, , PRN  sodium chloride flush, 5-40 mL, PRN  sodium chloride, 25 mL, PRN  HYDROmorphone, 0.25 mg, Q5 Min PRN  fentanNYL, 50 mcg, Q5 Min PRN  hydrALAZINE, 10 mg, Q15 Min PRN  glucose, 4 tablet, PRN  dextrose bolus, 125 mL, PRN   Or  dextrose bolus, 250 mL, PRN  glucagon (rDNA), 1 mg, PRN  dextrose, , Continuous PRN  guaiFENesin-dextromethorphan, 5 mL, Q4H PRN  hydrALAZINE (APRESOLINE) ivpb, 10 mg, Q4H PRN  magnesium hydroxide, 30 mL, Daily PRN  sodium chloride flush, 5-40 mL, PRN  sodium chloride, , PRN  ondansetron, 4 mg, Q8H PRN   Or  ondansetron, 4 mg, Q6H PRN  aluminum & magnesium hydroxide-simethicone, 30 mL, Q6H PRN  acetaminophen, 650 mg, Q6H PRN   Or  acetaminophen, 650 mg, Q6H PRN  glucose, 4 tablet, PRN  dextrose bolus, 125 mL, PRN   Or  dextrose bolus, 250 mL, PRN  glucagon (rDNA), 1 mg, PRN  dextrose, , Continuous PRN  ipratropium-albuterol, 1 vial, BID PRN      Labs      Recent Results (from the past 24 hour(s))   POCT Glucose    Collection Time: 09/10/22  1:36 AM   Result Value Ref Range    POC Glucose 58 (L) 70 - 99 MG/DL   POCT Glucose    Collection Time: 09/10/22  1:59 AM   Result Value Ref Range    POC Glucose 78 70 - 99 MG/DL   Vancomycin Level, Random    Collection Time: 09/10/22  4:50 AM   Result Value Ref Range    Vancomycin Rm 15.7 UG/ML    DOSE AMOUNT DOSE AMT.  GIVEN - 1750mg     DOSE TIME DOSE TIME GIVEN - 9/9 @11:00    Comprehensive Metabolic Panel w/ Reflex to MG    Collection Time: 09/10/22  4:50 AM   Result Value Ref Range    Sodium 139 135 - 145 MMOL/L    Potassium 3.8 3.5 - 5.1 MMOL/L    Chloride 106 99 - 110 mMol/L    CO2 25 21 - 32 MMOL/L    BUN 28 (H) 6 - 23 MG/DL    Creatinine 2.8 (H) 0.9 - 1.3 MG/DL    Glucose 72 70 - 99 MG/DL    Calcium 7.9 (L) 8.3 - 10.6 MG/DL    Albumin 2.2 (L) 3.4 - 5.0 GM/DL    Total Protein 5.5 (L) 6.4 - 8.2 GM/DL    Total Bilirubin 0.2 0.0 - 1.0 MG/DL    ALT <5 (L) 10 - 40 U/L    AST 15 15 - 37 IU/L    Alkaline Phosphatase 84 40 - 128 IU/L    GFR Non- 22 (L) >60 mL/min/1.73m2    GFR  26 (L) >60 mL/min/1.73m2    Anion Gap 8 4 - 16   Brain Natriuretic Peptide    Collection Time: 09/10/22  4:50 AM   Result Value Ref Range    Pro-BNP 3,498 (H) <300 PG/ML   Magnesium    Collection Time: 09/10/22  4:50 AM Result Value Ref Range    Magnesium 1.7 (L) 1.8 - 2.4 mg/dl   Phosphorus    Collection Time: 09/10/22  4:50 AM   Result Value Ref Range    Phosphorus 3.0 2.5 - 4.9 MG/DL   CBC with Auto Differential    Collection Time: 09/10/22  5:32 AM   Result Value Ref Range    WBC 13.1 (H) 4.0 - 10.5 K/CU MM    RBC 1.99 (L) 4.6 - 6.2 M/CU MM    Hemoglobin 6.0 (LL) 13.5 - 18.0 GM/DL    Hematocrit 18.6 (LL) 42 - 52 %    MCV 93.5 78 - 100 FL    MCH 30.2 27 - 31 PG    MCHC 32.3 32.0 - 36.0 %    RDW 15.1 (H) 11.7 - 14.9 %    Platelets 951 914 - 567 K/CU MM    MPV 10.0 7.5 - 11.1 FL    Differential Type AUTOMATED DIFFERENTIAL     Segs Relative 82.8 (H) 36 - 66 %    Lymphocytes % 6.8 (L) 24 - 44 %    Monocytes % 7.1 (H) 0 - 4 %    Eosinophils % 2.1 0 - 3 %    Basophils % 0.5 0 - 1 %    Segs Absolute 10.8 K/CU MM    Lymphocytes Absolute 0.9 K/CU MM    Monocytes Absolute 0.9 K/CU MM    Eosinophils Absolute 0.3 K/CU MM    Basophils Absolute 0.1 K/CU MM    Nucleated RBC % 0.0 %    Total Nucleated RBC 0.0 K/CU MM    Total Immature Neutrophil 0.09 K/CU MM    Immature Neutrophil % 0.7 (H) 0 - 0.43 %   TYPE AND SCREEN    Collection Time: 09/10/22  6:44 AM   Result Value Ref Range    ABO/Rh O POSITIVE     Antibody Screen NEGATIVE     Unit Number [id number]     Component LEUKO-POOR RED CELLS     Unit Divison 00     Status ISSUED     Transfusion Status OK TO TRANSFUSE     Crossmatch Result COMPATIBLE    POCT Glucose    Collection Time: 09/10/22  9:08 AM   Result Value Ref Range    POC Glucose 47 (L) 70 - 99 MG/DL   POCT Glucose    Collection Time: 09/10/22  9:47 AM   Result Value Ref Range    POC Glucose 63 (L) 70 - 99 MG/DL   POCT Glucose    Collection Time: 09/10/22 11:10 AM   Result Value Ref Range    POC Glucose 91 70 - 99 MG/DL   Hemoglobin and Hematocrit    Collection Time: 09/10/22  1:28 PM   Result Value Ref Range    Hemoglobin 7.3 (L) 13.5 - 18.0 GM/DL    Hematocrit 22.8 (L) 42 - 52 %        Imaging/Diagnostics Last 24 Hours   No results found.     Electronically signed by Zuleika Gao MD on 9/10/2022 at 2:52 PM

## 2022-09-10 NOTE — PROCEDURES
Flora Mathis is a 78 y.o. male patient. 1. Urinary tract infection associated with cystostomy catheter, initial encounter ([facility] [address]    2. Septicemia [facility])      Past Medical History:   Diagnosis Date    Acute urinary tract infection 3/15/2012    Ataxia 2010    Diabetes mellitus ([facility] [address] 2011    type 2, controlled    Fusion of spine of cervical region 10/2012    Gait disturbance 2011    History of prostate cancer 1996    adenocarcinoma    History of tobacco use 1959    Hyperlipidemia 2011    Osteoarthritis 2011     Blood pressure (!) 144/55, pulse 88, temperature (!) 102.6 °F (39.2 °C), temperature source Oral, resp. rate 19, height 5' 8\" (1.727 m), weight 213 lb 13.5 oz (97 kg), SpO2 94 %. Central Line    Date/Time: 9/9/2022 11:58 PM  Performed by: Delilah Mcneill MD  Authorized by: Delilah Mcneill MD   Consent: Verbal consent obtained. Risks and benefits: risks, benefits and alternatives were discussed  Consent given by: patient  Patient understanding: patient states understanding of the procedure being performed  Patient consent: the patient's understanding of the procedure matches consent given  Procedure consent: procedure consent matches procedure scheduled  Relevant documents: relevant documents present and verified  Test results: test results available and properly labeled  Site marked: the operative site was marked  Imaging studies: imaging studies available  Required items: required blood products, implants, devices, and special equipment available  Patient identity confirmed: arm band  Time out: Immediately prior to procedure a \"time out\" was called to verify the correct patient, procedure, equipment, support staff and site/side marked as required.   Indications: vascular access  Anesthesia: local infiltration    Anesthesia:  Local Anesthetic: lidocaine 1% without epinephrine    Sedation:  Patient sedated: no    Preparation: skin prepped with alcohol and skin prepped with 2% chlorhexidine  Skin prep agent dried: skin prep agent completely dried prior to procedure  Sterile barriers: all five maximum sterile barriers used - cap, mask, sterile gown, sterile gloves, and large sterile sheet  Hand hygiene: hand hygiene performed prior to central venous catheter insertion  Location details: right internal jugular  Patient position: flat  Catheter type: triple lumen  Number of attempts: 1  Successful placement: yes  Post-procedure: line sutured and dressing applied  Assessment: blood return through all ports, free fluid flow, placement verified by x-ray and no pneumothorax on x-ray      Aye Abreu MD, MD  9/9/2022

## 2022-09-10 NOTE — CONSULTS
Consult completed without success. Despite extensive troubleshooting PICC line coiled time and time again and would not drop into the SVC. Recommend CVC or tunneled PICC per IR if pt requires central access. Primary RN notified.

## 2022-09-10 NOTE — PROGRESS NOTES
PHARMACY CONSULT FOR RENAL DOSING OF FETROJA PER DR HAMILTON    INDICATION:  possible CRE    RENAL LAB EVALUATION  Estimated Creatinine Clearance: 22 mL/min (A) (based on SCr of 2.8 mg/dL (H)). Recent Labs     09/08/22  1816 09/09/22  0408 09/10/22  0450   BUN 26* 27* 28*   CREATININE 1.9* 2.1* 2.8*     PAMELLA ON CKD3 (BASELINE SCR ~1.6)    Decrease dose to Fetroja 1gm ivpb q8h (each dose to be infused over 3 hours) for CrCl estimate in range 15 to <30 ml/min    Pharmacy will monitor renal trends daily and will adjust the Fetroja dose as needed.       Xiomara Robb, [id number] [facility], PharmD

## 2022-09-10 NOTE — PROGRESS NOTES
Physical Therapy Treatment Note  Name: Catarina Meier MRN: [id number] :   1940   Date:  9/10/2022   Admission Date: 2022 Room:  -A     ATTEMPT  Attempt at 0920, RN states pt is not appropriate to see this AM dt blood draw and POC glucose of 47 MG/DL. PT will f/u as schedule allows.      Electronically signed by:    Bryan Palacios PTA  9/10/2022, 11:20 AM

## 2022-09-10 NOTE — PROGRESS NOTES
Progress Note( Dr. Caryle Cornwall)  9/10/2022  Subjective:   Admit Date: 8/28/2022  PCP: Melvin Jose MD    Admitted For :Fatigue and possible sepsis from UTI    Consulted For: Control of blood glucose as patient has having hypoglycemic episodes    Interval History: feeling Somewhat  tired  had  no more episode of low blood glucose yesterday   mental status altered seem to be confused. Follow-up with fever of 102 yes last night  Suspected patient was septic was transferred to [facility] care unit for better care  Patient is running lower blood glucose in the 60s and 70s even though he not given any insulin last night  Hemoglobin again dropped to 6 around 6 been given packed red cell transfusion    His hypoglycemia is probably related to his sepsis  Pt had Following Urology procedure done early morng hour spf 9/8/2022    Cystoscopy, left ureteral stent exchange,  suprapubic tube exchange. Denies any chest pains,   Denies SOB . Denies nausea or vomiting. No new bowel or bladder symptoms.        Intake/Output Summary (Last 24 hours) at 9/10/2022 1012  Last data filed at 9/10/2022 0534  Gross per 24 hour   Intake 100 ml   Output 775 ml   Net -675 ml         DATA    CBC:   Recent Labs     09/08/22  1816 09/09/22  0408 09/10/22  0532   WBC 17.9* 23.6* 13.1*   HGB 7.6* 8.5* 6.0*    419 335      CMP:  Recent Labs     09/08/22  1816 09/09/22  0408 09/10/22  0450    143 139   K 4.3 4.4 3.8    104 106   CO2 22 27 25   BUN 26* 27* 28*   CREATININE 1.9* 2.1* 2.8*   CALCIUM 8.4 8.9 7.9*   PROT 5.3*  --  5.5*   LABALBU 2.8*  --  2.2*   BILITOT 0.3  --  0.2   ALKPHOS 97  --  84   AST 12*  --  15   ALT 6*  --  <5*       Lipids: No results found for: CHOL, HDL, TRIG  Glucose:  Recent Labs     09/10/22  0159 09/10/22  0908 09/10/22  0947   POCGLU 78 47* 63*       EbbbopkjsbH5H:  Lab Results   Component Value Date/Time    LABA1C 7.8 08/29/2022 01:30 AM     High Sensitivity TSH:   Lab Results   Component Value Date/Time    TSHHS 7.590 04/28/2022 06:59 AM     Free T3: No results found for: FT3  Free T4:  Lab Results   Component Value Date/Time    T4FREE 1.15 03/27/2022 08:22 AM       XR CHEST 1 VIEW   Final Result   1. Right IJ line with the tip overlying the distal SVC/right atrial region. 2. Low lung volumes with bibasilar atelectasis. No significant change. XR CHEST PORTABLE   Final Result   Satisfactory position of the PICC. No acute cardiopulmonary process         VL DUP UPPER EXTREMITY VENOUS RIGHT   Final Result   No evidence of DVT within the right upper extremity. XR CHEST PORTABLE   Final Result   No acute process. XR ABDOMEN (KUB) (SINGLE AP VIEW)   Final Result   Prominent loops of bowel may represent persistent ileus or partial   obstruction. FL LESS THAN 1 HOUR   Final Result      XR CHEST PORTABLE   Final Result   No abnormalities noted. US ABDOMEN LIMITED Specify organ? LIVER, GALLBLADDER   Final Result   Unremarkable right upper quadrant ultrasound. CT ABDOMEN PELVIS WO CONTRAST Additional Contrast? None   Final Result   1. Left ureteral stent in place. Mild bilateral urinary tract dilatation   with new right perinephric and periureteral stranding raises concern for   urinary tract infection. No urinary stones. 2.  Suprapubic catheter in the decompressed urinary bladder. 3.  Ill-defined stranding/fluid throughout the retroperitoneum may relate to   vascular congestion or reactive changes. No intraperitoneal free air or   abscess. XR CHEST PORTABLE   Final Result   Increased interstitial opacity. While much or all of this may be chronic,   please correlate with any clinical evidence of acute interstitial edema.          CT ABDOMEN PELVIS WO CONTRAST Additional Contrast? None    (Results Pending)        Scheduled Medicines   Medications:    fluconazole  400 mg IntraVENous Q24H    vancomycin (VANCOCIN) intermittent dosing (placeholder) Other RX Placeholder    Cefiderocol Sulfate Tosylate  1,500 mg IntraVENous Q8H    lidocaine PF  5 mL IntraDERmal Once    sodium chloride flush  5-40 mL IntraVENous 2 times per day    sodium chloride flush  5-40 mL IntraVENous 2 times per day    meperidine  12.5 mg IntraVENous Once    ferrous sulfate  325 mg Oral BID WC    carvedilol  6.25 mg Oral BID WC    lidocaine  1 patch TransDERmal Daily    insulin lispro  0-16 Units SubCUTAneous TID WC    insulin lispro  0-4 Units SubCUTAneous 2 times per day    insulin glargine  20 Units SubCUTAneous Nightly    amLODIPine  10 mg Oral Daily    aspirin  81 mg Oral Daily    atorvastatin  80 mg Oral Nightly    levothyroxine  75 mcg Oral Daily    docusate sodium  100 mg Oral BID    bisacodyl  10 mg Rectal Daily    melatonin  10 mg Oral Nightly    pantoprazole  40 mg Oral QAM AC    polyethylene glycol  17 g Oral BID    pregabalin  50 mg Oral BID    sodium chloride flush  5-40 mL IntraVENous 2 times per day    enoxaparin  40 mg SubCUTAneous QPM      Infusions:    sodium chloride      sodium chloride      sodium chloride      dextrose      sodium chloride 25 mL (09/06/22 6749)    dextrose           Objective:   Vitals: /61   Pulse 78   Temp 99.8 °F (37.7 °C) (Oral)   Resp 18   Ht 5' 8\" (1.727 m)   Wt 213 lb 13.5 oz (97 kg)   SpO2 94%   BMI 32.52 kg/m²   General appearance: alert and cooperative with exam  Neck: no JVD or bruit  Thyroid : Normal lobes   Lungs: Has Vesicular Breath sounds   Heart:  regular rate and rhythm  Abdomen: soft, non-tender; bowel sounds normal; no masses,  no organomegaly has suprapubic catheter   Musculoskeletal: Normal  Extremities: extremities normal, , no edema  Neurologic:  Awake, alert, oriented to name, place and time. Cranial nerves II-XII are grossly intact. Motor weakness. Sensory neuropathy. ,  and gait is abnormal.  Stable    Assessment:     Patient Active Problem List:     Gait disturbance     Generalized weakness     Diabetes mellitus ([address]     Osteoarthritis     Anemia of chronic disorder     Infected implanted bladder sphincter cuff     Escherichia coli urinary tract infection     Hypertension     Sepsis ([address]     Acute encephalopathy     Type 2 diabetes mellitus with hypoglycemia without coma (HCC)     UTI (urinary tract infection) due to urinary indwelling catheter (HCC)     Hyperkalemia     Acute kidney failure (HCC)     Leg weakness, bilateral     Type 2 diabetes mellitus with neurological manifestations, uncontrolled ([address]     Complicated UTI (urinary tract infection)     Essential hypertension     Severe muscle deconditioning     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 (HCC)     Frequent falls     Chronic kidney disease, stage 3a ([address]     Uncontrolled type 2 diabetes mellitus with peripheral neuropathy (HCC)     Prostate cancer (HCC)     Suprapubic catheter ([address]     Sepsis due to urinary tract infection ([address]     Coagulase negative Staphylococcus bacteremia     Chronic kidney disease, stage IV (severe) (HCC)     Acute metabolic encephalopathy     SVT (supraventricular tachycardia) (HCC)     Metabolic encephalopathy     History of prostate cancer     Cigarette nicotine dependence without complication     Altered mental status     Serratia marcescens infection     Hypoglycemia      Plan:     Reviewed POC blood glucose . Labs and X ray results   Reviewed Current Medicines   On Correction bolus Humalog/ Basal Lantus Insulin regime /  Will start on D10 about 30 cc an hour to keep his blood glucose over 100  Monitor Blood glucose frequently   Modified  the dose of Insulin/ other medicines as neede  Will follow     .      Leanna Armenta MD, MD

## 2022-09-11 LAB
ALBUMIN SERPL-MCNC: 2.3 GM/DL (ref 3.4–5)
ALP BLD-CCNC: 86 IU/L (ref 40–128)
ALT SERPL-CCNC: 6 U/L (ref 10–40)
ANION GAP SERPL CALCULATED.3IONS-SCNC: 11 MMOL/L (ref 4–16)
AST SERPL-CCNC: 23 IU/L (ref 15–37)
BACTERIA: NEGATIVE /HPF
BASOPHILS ABSOLUTE: 0.1 K/CU MM
BASOPHILS RELATIVE PERCENT: 0.5 % (ref 0–1)
BILIRUB SERPL-MCNC: 0.2 MG/DL (ref 0–1)
BILIRUBIN URINE: ABNORMAL MG/DL
BLOOD, URINE: ABNORMAL
BUN BLDV-MCNC: 34 MG/DL (ref 6–23)
CALCIUM SERPL-MCNC: 7.7 MG/DL (ref 8.3–10.6)
CHLORIDE BLD-SCNC: 103 MMOL/L (ref 99–110)
CLARITY: ABNORMAL
CO2: 23 MMOL/L (ref 21–32)
COLOR: ABNORMAL
CREAT SERPL-MCNC: 3.4 MG/DL (ref 0.9–1.3)
CREATININE URINE: 18.9 MG/DL (ref 39–259)
DIFFERENTIAL TYPE: ABNORMAL
EOSINOPHILS ABSOLUTE: 0.6 K/CU MM
EOSINOPHILS RELATIVE PERCENT: 5.9 % (ref 0–3)
GFR AFRICAN AMERICAN: 21 ML/MIN/1.73M2
GFR NON-AFRICAN AMERICAN: 17 ML/MIN/1.73M2
GLUCOSE BLD-MCNC: 172 MG/DL (ref 70–99)
GLUCOSE BLD-MCNC: 179 MG/DL (ref 70–99)
GLUCOSE BLD-MCNC: 214 MG/DL (ref 70–99)
GLUCOSE BLD-MCNC: 237 MG/DL (ref 70–99)
GLUCOSE BLD-MCNC: 256 MG/DL (ref 70–99)
GLUCOSE, URINE: 250 MG/DL
HCT VFR BLD CALC: 22.3 % (ref 42–52)
HEMOGLOBIN: 7.1 GM/DL (ref 13.5–18)
IMMATURE NEUTROPHIL %: 0.5 % (ref 0–0.43)
KETONES, URINE: ABNORMAL MG/DL
LEUKOCYTE ESTERASE, URINE: ABNORMAL
LYMPHOCYTES ABSOLUTE: 1 K/CU MM
LYMPHOCYTES RELATIVE PERCENT: 11.1 % (ref 24–44)
MAGNESIUM: 1.6 MG/DL (ref 1.8–2.4)
MCH RBC QN AUTO: 29.5 PG (ref 27–31)
MCHC RBC AUTO-ENTMCNC: 31.8 % (ref 32–36)
MCV RBC AUTO: 92.5 FL (ref 78–100)
MONOCYTES ABSOLUTE: 0.6 K/CU MM
MONOCYTES RELATIVE PERCENT: 6.8 % (ref 0–4)
NITRITE URINE, QUANTITATIVE: POSITIVE
NUCLEATED RBC %: 0 %
PDW BLD-RTO: 15.8 % (ref 11.7–14.9)
PH, URINE: 6.5 (ref 5–8)
PHOSPHORUS: 3.3 MG/DL (ref 2.5–4.9)
PLATELET # BLD: 314 K/CU MM (ref 140–440)
PMV BLD AUTO: 10.6 FL (ref 7.5–11.1)
POTASSIUM SERPL-SCNC: 3.6 MMOL/L (ref 3.5–5.1)
PROT/CREAT RATIO, UR: 3.1
PROTEIN UA: 100 MG/DL
RBC # BLD: 2.41 M/CU MM (ref 4.6–6.2)
RBC URINE: 3866 /HPF (ref 0–3)
SEGMENTED NEUTROPHILS ABSOLUTE COUNT: 7 K/CU MM
SEGMENTED NEUTROPHILS RELATIVE PERCENT: 75.2 % (ref 36–66)
SODIUM BLD-SCNC: 137 MMOL/L (ref 135–145)
SODIUM URINE: 121 MMOL/L (ref 35–167)
SPECIFIC GRAVITY UA: 1.01 (ref 1–1.03)
TOTAL IMMATURE NEUTOROPHIL: 0.05 K/CU MM
TOTAL NUCLEATED RBC: 0 K/CU MM
TOTAL PROTEIN: 4.5 GM/DL (ref 6.4–8.2)
TRICHOMONAS: ABNORMAL /HPF
URINE TOTAL PROTEIN: 58.7 MG/DL
UROBILINOGEN, URINE: 2 MG/DL (ref 0.2–1)
WBC # BLD: 9.3 K/CU MM (ref 4–10.5)
WBC CLUMP: ABNORMAL /HPF
WBC UA: 812 /HPF (ref 0–2)

## 2022-09-11 PROCEDURE — 84100 ASSAY OF PHOSPHORUS: CPT

## 2022-09-11 PROCEDURE — 6370000000 HC RX 637 (ALT 250 FOR IP): Performed by: INTERNAL MEDICINE

## 2022-09-11 PROCEDURE — 6370000000 HC RX 637 (ALT 250 FOR IP): Performed by: SPECIALIST

## 2022-09-11 PROCEDURE — 85025 COMPLETE CBC W/AUTO DIFF WBC: CPT

## 2022-09-11 PROCEDURE — 84156 ASSAY OF PROTEIN URINE: CPT

## 2022-09-11 PROCEDURE — 83735 ASSAY OF MAGNESIUM: CPT

## 2022-09-11 PROCEDURE — 6360000002 HC RX W HCPCS: Performed by: INTERNAL MEDICINE

## 2022-09-11 PROCEDURE — 2580000003 HC RX 258: Performed by: INTERNAL MEDICINE

## 2022-09-11 PROCEDURE — 2580000003 HC RX 258: Performed by: STUDENT IN AN ORGANIZED HEALTH CARE EDUCATION/TRAINING PROGRAM

## 2022-09-11 PROCEDURE — 82570 ASSAY OF URINE CREATININE: CPT

## 2022-09-11 PROCEDURE — 6360000002 HC RX W HCPCS: Performed by: STUDENT IN AN ORGANIZED HEALTH CARE EDUCATION/TRAINING PROGRAM

## 2022-09-11 PROCEDURE — 2580000003 HC RX 258: Performed by: ANESTHESIOLOGY

## 2022-09-11 PROCEDURE — 81001 URINALYSIS AUTO W/SCOPE: CPT

## 2022-09-11 PROCEDURE — 82962 GLUCOSE BLOOD TEST: CPT

## 2022-09-11 PROCEDURE — 80053 COMPREHEN METABOLIC PANEL: CPT

## 2022-09-11 PROCEDURE — 51705 CHANGE OF BLADDER TUBE: CPT

## 2022-09-11 PROCEDURE — 2580000003 HC RX 258: Performed by: SPECIALIST

## 2022-09-11 PROCEDURE — 2060000000 HC ICU INTERMEDIATE R&B

## 2022-09-11 PROCEDURE — 84300 ASSAY OF URINE SODIUM: CPT

## 2022-09-11 RX ORDER — INSULIN GLARGINE 100 [IU]/ML
10 INJECTION, SOLUTION SUBCUTANEOUS NIGHTLY
Status: DISCONTINUED | OUTPATIENT
Start: 2022-09-11 | End: 2022-09-12

## 2022-09-11 RX ORDER — DOCUSATE SODIUM 100 MG/1
100 CAPSULE, LIQUID FILLED ORAL 2 TIMES DAILY PRN
Status: DISCONTINUED | OUTPATIENT
Start: 2022-09-11 | End: 2022-09-21 | Stop reason: HOSPADM

## 2022-09-11 RX ORDER — DEXTROSE MONOHYDRATE 100 MG/ML
250 INJECTION, SOLUTION INTRAVENOUS CONTINUOUS
Status: DISCONTINUED | OUTPATIENT
Start: 2022-09-11 | End: 2022-09-12

## 2022-09-11 RX ORDER — POLYETHYLENE GLYCOL 3350 17 G/17G
17 POWDER, FOR SOLUTION ORAL DAILY PRN
Status: DISCONTINUED | OUTPATIENT
Start: 2022-09-11 | End: 2022-09-21 | Stop reason: HOSPADM

## 2022-09-11 RX ORDER — BISACODYL 10 MG
10 SUPPOSITORY, RECTAL RECTAL DAILY PRN
Status: DISCONTINUED | OUTPATIENT
Start: 2022-09-11 | End: 2022-09-21 | Stop reason: HOSPADM

## 2022-09-11 RX ORDER — MAGNESIUM SULFATE 4 G/50ML
4000 INJECTION INTRAVENOUS ONCE
Status: COMPLETED | OUTPATIENT
Start: 2022-09-11 | End: 2022-09-11

## 2022-09-11 RX ADMIN — FERROUS SULFATE TAB 325 MG (65 MG ELEMENTAL FE) 325 MG: 325 (65 FE) TAB at 09:45

## 2022-09-11 RX ADMIN — DOCUSATE SODIUM 100 MG: 100 CAPSULE, LIQUID FILLED ORAL at 09:25

## 2022-09-11 RX ADMIN — ATORVASTATIN CALCIUM 80 MG: 40 TABLET, FILM COATED ORAL at 20:53

## 2022-09-11 RX ADMIN — INSULIN GLARGINE 10 UNITS: 100 INJECTION, SOLUTION SUBCUTANEOUS at 20:58

## 2022-09-11 RX ADMIN — FERROUS SULFATE TAB 325 MG (65 MG ELEMENTAL FE) 325 MG: 325 (65 FE) TAB at 16:10

## 2022-09-11 RX ADMIN — PANTOPRAZOLE SODIUM 40 MG: 40 TABLET, DELAYED RELEASE ORAL at 07:04

## 2022-09-11 RX ADMIN — AMLODIPINE BESYLATE 10 MG: 10 TABLET ORAL at 09:25

## 2022-09-11 RX ADMIN — CARVEDILOL 6.25 MG: 6.25 TABLET, FILM COATED ORAL at 09:45

## 2022-09-11 RX ADMIN — DEXTROSE MONOHYDRATE 250 ML: 100 INJECTION, SOLUTION INTRAVENOUS at 09:45

## 2022-09-11 RX ADMIN — FUROSEMIDE 80 MG: 10 INJECTION, SOLUTION INTRAMUSCULAR; INTRAVENOUS at 09:26

## 2022-09-11 RX ADMIN — FLUCONAZOLE 400 MG: 2 INJECTION, SOLUTION INTRAVENOUS at 09:42

## 2022-09-11 RX ADMIN — Medication 10 MG: at 20:53

## 2022-09-11 RX ADMIN — Medication 10 ML: at 20:58

## 2022-09-11 RX ADMIN — SODIUM CHLORIDE, PRESERVATIVE FREE 10 ML: 5 INJECTION INTRAVENOUS at 09:27

## 2022-09-11 RX ADMIN — INSULIN LISPRO 2 UNITS: 100 INJECTION, SOLUTION INTRAVENOUS; SUBCUTANEOUS at 17:02

## 2022-09-11 RX ADMIN — INSULIN LISPRO 4 UNITS: 100 INJECTION, SOLUTION INTRAVENOUS; SUBCUTANEOUS at 12:13

## 2022-09-11 RX ADMIN — MAGNESIUM SULFATE HEPTAHYDRATE 4000 MG: 80 INJECTION, SOLUTION INTRAVENOUS at 11:42

## 2022-09-11 RX ADMIN — LEVOTHYROXINE SODIUM 75 MCG: 75 TABLET ORAL at 07:04

## 2022-09-11 RX ADMIN — ASPIRIN 81 MG CHEWABLE TABLET 81 MG: 81 TABLET CHEWABLE at 09:25

## 2022-09-11 RX ADMIN — CARVEDILOL 6.25 MG: 6.25 TABLET, FILM COATED ORAL at 16:10

## 2022-09-11 RX ADMIN — CEFIDEROCOL SULFATE TOSYLATE 1000 MG: 1 INJECTION, POWDER, FOR SOLUTION INTRAVENOUS at 16:59

## 2022-09-11 RX ADMIN — Medication 10 ML: at 09:27

## 2022-09-11 RX ADMIN — SODIUM CHLORIDE, PRESERVATIVE FREE 10 ML: 5 INJECTION INTRAVENOUS at 20:59

## 2022-09-11 RX ADMIN — SODIUM CHLORIDE, PRESERVATIVE FREE 10 ML: 5 INJECTION INTRAVENOUS at 20:58

## 2022-09-11 RX ADMIN — POLYETHYLENE GLYCOL (3350) 17 G: 17 POWDER, FOR SOLUTION ORAL at 09:25

## 2022-09-11 NOTE — PROGRESS NOTES
Progress Note( Dr. Ferdinand Pack)  9/11/2022  Subjective:   Admit Date: 8/28/2022  PCP: Darian Guzman MD    Admitted For :Fatigue and possible sepsis from UTI    Consulted For: Control of blood glucose as patient has having hypoglycemic episodes    Interval History: feeling Somewhat  tired  had  no more episode of low blood glucose yesterday  Patient had an episode of a fever with a temperature of 102 felt very dizzy and confused patient was worked up for sepsis and moved to stepdown care ICU on evening of 9/9/2022  Is doing somewhat better more alert and awake to himself    Pt had Following Urology procedure done early morng hour spf 9/8/2022      Cystoscopy, left ureteral stent exchange,  suprapubic tube exchange. Denies any chest pains,   Denies SOB . Denies nausea or vomiting. Not eating much  No new bowel or bladder symptoms.        Intake/Output Summary (Last 24 hours) at 9/11/2022 0951  Last data filed at 9/11/2022 0000  Gross per 24 hour   Intake 237 ml   Output 1750 ml   Net -1513 ml         DATA    CBC:   Recent Labs     09/09/22  0408 09/10/22  0532 09/10/22  1328 09/11/22  0640   WBC 23.6* 13.1*  --  9.3   HGB 8.5* 6.0* 7.3* 7.1*    335  --  314      CMP:  Recent Labs     09/08/22  1816 09/09/22  0408 09/10/22  0450 09/11/22  0640    143 139 137   K 4.3 4.4 3.8 3.6    104 106 103   CO2 22 27 25 23   BUN 26* 27* 28* 34*   CREATININE 1.9* 2.1* 2.8* 3.4*   CALCIUM 8.4 8.9 7.9* 7.7*   PROT 5.3*  --  5.5* 4.5*   LABALBU 2.8*  --  2.2* 2.3*   BILITOT 0.3  --  0.2 0.2   ALKPHOS 97  --  84 86   AST 12*  --  15 23   ALT 6*  --  <5* 6*       Lipids: No results found for: CHOL, HDL, TRIG  Glucose:  Recent Labs     09/10/22  1701 09/10/22  2008 09/11/22  0154   POCGLU 108* 175* 172*       [id number]:  Lab Results   Component Value Date/Time    LABA1C 7.8 08/29/2022 01:30 AM     High Sensitivity TSH:   Lab Results   Component Value Date/Time    TSHHS 7.590 04/28/2022 06:59 AM     Free T3: No results found for: FT3  Free T4:  Lab Results   Component Value Date/Time    T4FREE 1.15 03/27/2022 08:22 AM       CT ABDOMEN PELVIS WO CONTRAST Additional Contrast? None   Final Result   1. Probable hematuria left urinary collecting system (possible active   hemorrhage into the left urinary collecting system) and mild-to-moderate left   hydronephrosis. 2. The double-J stent appears in unremarkable position without left ureter   calculus evident. 3. Trace abdominopelvic ascites is probably reactive. 4.  Vascular calcifications are noted reflecting calcific atherosclerosis. 5. Trace left pleural effusion with left basilar relaxation atelectasis or   infiltrate. 6. Minimal subsegmental atelectasis posterior right lung base. 7. Small hiatal hernia. XR CHEST 1 VIEW   Final Result   1. Right IJ line with the tip overlying the distal SVC/right atrial region. 2. Low lung volumes with bibasilar atelectasis. No significant change. XR CHEST PORTABLE   Final Result   Satisfactory position of the PICC. No acute cardiopulmonary process         VL DUP UPPER EXTREMITY VENOUS RIGHT   Final Result   No evidence of DVT within the right upper extremity. XR CHEST PORTABLE   Final Result   No acute process. XR ABDOMEN (KUB) (SINGLE AP VIEW)   Final Result   Prominent loops of bowel may represent persistent ileus or partial   obstruction. FL LESS THAN 1 HOUR   Final Result      XR CHEST PORTABLE   Final Result   No abnormalities noted. US ABDOMEN LIMITED Specify organ? LIVER, GALLBLADDER   Final Result   Unremarkable right upper quadrant ultrasound. CT ABDOMEN PELVIS WO CONTRAST Additional Contrast? None   Final Result   1. Left ureteral stent in place. Mild bilateral urinary tract dilatation   with new right perinephric and periureteral stranding raises concern for   urinary tract infection. No urinary stones.       2.  Suprapubic catheter in the decompressed urinary bladder. 3.  Ill-defined stranding/fluid throughout the retroperitoneum may relate to   vascular congestion or reactive changes. No intraperitoneal free air or   abscess. XR CHEST PORTABLE   Final Result   Increased interstitial opacity. While much or all of this may be chronic,   please correlate with any clinical evidence of acute interstitial edema.               Scheduled Medicines   Medications:    magnesium sulfate  4,000 mg IntraVENous Once    insulin lispro  0-8 Units SubCUTAneous TID WC    furosemide  80 mg IntraVENous TID    Cefiderocol Sulfate Tosylate  1,000 mg IntraVENous Q8H    fluconazole  400 mg IntraVENous Q24H    vancomycin (VANCOCIN) intermittent dosing (placeholder)   Other RX Placeholder    lidocaine PF  5 mL IntraDERmal Once    sodium chloride flush  5-40 mL IntraVENous 2 times per day    sodium chloride flush  5-40 mL IntraVENous 2 times per day    meperidine  12.5 mg IntraVENous Once    ferrous sulfate  325 mg Oral BID WC    carvedilol  6.25 mg Oral BID WC    lidocaine  1 patch TransDERmal Daily    insulin lispro  0-4 Units SubCUTAneous 2 times per day    [Held by provider] insulin glargine  20 Units SubCUTAneous Nightly    amLODIPine  10 mg Oral Daily    aspirin  81 mg Oral Daily    atorvastatin  80 mg Oral Nightly    levothyroxine  75 mcg Oral Daily    docusate sodium  100 mg Oral BID    bisacodyl  10 mg Rectal Daily    melatonin  10 mg Oral Nightly    pantoprazole  40 mg Oral QAM AC    polyethylene glycol  17 g Oral BID    sodium chloride flush  5-40 mL IntraVENous 2 times per day    [Held by provider] enoxaparin  40 mg SubCUTAneous QPM      Infusions:    dextrose 250 mL (09/11/22 0945)    sodium chloride      sodium chloride      sodium chloride      dextrose      sodium chloride 25 mL (09/06/22 2897)    dextrose           Objective:   Vitals: BP (!) 125/59   Pulse 82   Temp 98.5 °F (36.9 °C) (Oral)   Resp 18   Ht 5' 8\" (1.727 m)   Wt 211 lb 14.4 oz (96.1 kg) Plan:     Reviewed POC blood glucose . Labs and X ray results   Reviewed Current Medicines   On Correction bolus Humalog/ Basal Lantus ( On Hold )Insulin regime /   Monitor Blood glucose frequently   Modified  the dose of Insulin/ other medicines as neede  Management is working to show the patient to skilled nursing unit possibly [place]  Will follow     .      Catarino Rayo MD, MD

## 2022-09-11 NOTE — PROGRESS NOTES
Nephrology Progress Note  9/11/2022 11:46 AM        Subjective:   Admit Date: 8/28/2022  PCP: Elio Warren MD    Interval History: Patient seen earlier today, he is more alert, awake and oriented this morning    Diet: Reported, reasonable oral intake    ROS: He was lying flat and has no PND orthopnea or shortness of breath  Urine output recorded 1.75 L for the last 24 hours  No fever and acceptable blood pressure  Still with D10 infusion      Data:     Current meds:    magnesium sulfate  4,000 mg IntraVENous Once    insulin glargine  10 Units SubCUTAneous Nightly    insulin lispro  0-8 Units SubCUTAneous TID WC    furosemide  80 mg IntraVENous TID    Cefiderocol Sulfate Tosylate  1,000 mg IntraVENous Q8H    fluconazole  400 mg IntraVENous Q24H    vancomycin (VANCOCIN) intermittent dosing (placeholder)   Other RX Placeholder    lidocaine PF  5 mL IntraDERmal Once    sodium chloride flush  5-40 mL IntraVENous 2 times per day    sodium chloride flush  5-40 mL IntraVENous 2 times per day    meperidine  12.5 mg IntraVENous Once    ferrous sulfate  325 mg Oral BID WC    carvedilol  6.25 mg Oral BID WC    lidocaine  1 patch TransDERmal Daily    insulin lispro  0-4 Units SubCUTAneous 2 times per day    amLODIPine  10 mg Oral Daily    aspirin  81 mg Oral Daily    atorvastatin  80 mg Oral Nightly    levothyroxine  75 mcg Oral Daily    docusate sodium  100 mg Oral BID    bisacodyl  10 mg Rectal Daily    melatonin  10 mg Oral Nightly    pantoprazole  40 mg Oral QAM AC    polyethylene glycol  17 g Oral BID    sodium chloride flush  5-40 mL IntraVENous 2 times per day    [Held by provider] enoxaparin  40 mg SubCUTAneous QPM      dextrose 250 mL (09/11/22 0945)    sodium chloride      sodium chloride      sodium chloride      dextrose      sodium chloride 25 mL (09/06/22 5337)    dextrose           I/O last 3 completed shifts: In: 337 [Blood:207;  Other:130]  Out: 2525 [Urine:2525]    CBC:   Recent Labs     09/09/22  0408 09/10/22  0532 09/10/22  1328 09/11/22  0640   WBC 23.6* 13.1*  --  9.3   HGB 8.5* 6.0* 7.3* 7.1*    335  --  314          Recent Labs     09/09/22  0408 09/10/22  0450 09/11/22  0640    139 137   K 4.4 3.8 3.6    106 103   CO2 27 25 23   BUN 27* 28* 34*   CREATININE 2.1* 2.8* 3.4*   GLUCOSE 116* 72 179*       Lab Results   Component Value Date    CALCIUM 7.7 (L) 09/11/2022    PHOS 3.3 09/11/2022       Objective:     Vitals: BP (!) 112/49   Pulse 77   Temp 98.5 °F (36.9 °C) (Oral)   Resp 13   Ht 5' 8\" (1.727 m)   Wt 211 lb 14.4 oz (96.1 kg)   SpO2 98%   BMI 32.22 kg/m² ,    General appearance: Alert, awake and oriented, lying flat  HEENT: At least 2+ conjunctival pallor, no scleral icterus  Neck: Seems supple  Lungs: Right IJ central venous catheter  Heart: Seemed regular rate and rhythm this morning  Abdomen: Soft, suprapubic catheter  Extremities: Less leg edema this morning      Problem List :         Impression :     Acute kidney injury with underlying CKD stage III-urine output improved-from sepsis and likely fluid overload-also may had intermittent obstruction from bleeding -BUN/creatinine worsened-since she has no overt shortness of breath and lower lung field has no pulmonary edema-we will hold the diuretics-also remain at risk for drug-induced either tubular toxicity or acute interstitial nephritis  Sepsis-mainly genitourinary infection-with multiple antimicrobial agent  Underlying diabetes, prostate cancer    Recommendation/Plan  :     Hold diuretics  Watch for any obstruction  Also send a urine specimen  Avoid any nonessential medication  Watch for iatrogenic and nosocomial complication and follow clinically and biochemically      Fred Triplett MD MD

## 2022-09-11 NOTE — PROGRESS NOTES
Hx ESBL E. Coli  Repeat urine culture < 10 candida  Repeat blood culture- 2/4 yeast  (on diflucn)    Overall feeling better today  Fevers resolved  Lasix yesterday- increased urine output  Nephrology following    Gross hematuria appears to be improving     CT Scan a/p 9/10/2022  Bladder decompressed  Possible blood products in left collecting system (recent stent exchange)  Right kidney ok     WBC improved to 9.3  Hgb 7.1 9/11/2022 (improved from 6 after PRBC)     On Fetroja,  and fluconazole  per ID         Jael Bennett is a 78 y.o. male admitted 7/58/[birth year]18 for metabolic encephalopathy. 1) Chronic Urinary Retention: managed with suprapubic catheter and exchanged monthly, last exchanged 9/7/22. Recommend SPT exchanges q3 weeks. 2) Chronic Left Hydronephrosis managed with chronic stent: S/p cystoscopy, left ureteral stent exchange, suprapubic tube exchange 9/7/2022                RN staff to manually irrigate bladder q4hrs and prn clots. Gross hematuria appears to be resolving     3) Sepsis w Complicated UTI:               Repeat blood and urine culture Candida/yeast              Prior Blood cultures 4/4 +ESBL E.coli              WBC 9.3, Fevers resolved                     On Fetroja,  and fluconazole  per ID       Recommend irrigate SP w saline 100 ml daily. 4) H/o Prostate Cancer: S/p RPP with Dr. Anny Coats in [address] [place] [address] PSA 0.93 2/2021.  On Eligard q6 months    5) acute PAMELLA: nephrology following     Will follow

## 2022-09-11 NOTE — PROGRESS NOTES
V2.0  [facility] Hospitalist Progress Note      Name:  Cheyanne Chand /Age/Sex: 1940  (78 y.o. male)   MRN & CSN:  [id number] & [id number] Encounter Date/Time: 2022 1:46 PM EDT    Location:  -A PCP: Nini Bhatti MD       Hospital Day: 15    Assessment and Plan:   Cheyanne Chand is a 78 y.o. male with pmh of prostate cancer, diabetes mellitus, hypertension who admitted with sciatic found out with Complicated UTI (urinary tract infection)    Interval events: Overnight patient remained stable, patient is more alert today but looks swollen grossly still, input/output 237/1750 making some urine but looks bloody still, vital stable, bun/creatinine 34/3.4, hemoglobin 7.1 stable from 7.3 yesterday,  -We will give 1 dose of Lasix today    # ESBL bacteremia: Secondary to UTI  # Complicated UTI  - Urine culture on  growing E. coli resistant to Cipro and cefepime  - Urine culture on  no significant growth  - CT abdomen/pelvis showing left ureteral stent in place, mild bilateral urinary tract dilatation with new right perinephric and periureteral stranding raises concern for UTI  - ID following continue with Fetroja, vancomycin and fluconazole per ID    # Hematuria s/p cystoscopy and exchange of stent: Still urine in the bag looks bloody  - Urology following recommended to manually irrigate bladder every 4 hours and as needed clots  - Monitor H&H    # Patient had right upper extremity swelling, obtained ultrasound right upper extremity negative for DVT but patient have swelling of the right, left, lower extremities suspected secondary to PAMELLA and fluid overload  -We will continue to monitor and you Lasix accordingly    # Normocytic anemia   - Continue with H&H  -Transfuse if hemoglobin less than 7    # History of prostate cancer status post prostatectomy   # Chronic urinary retention s/p suprapubic catheter and exchange every 3 weeks per urology     # Hypertension: Blood pressure stable continue on current medication     # Hypothyroidism continue levothyroxine     # CKD  - Avoid nephrotoxic      # Type 2 diabetes: Continue SSI, hypoglycemic protocol diabetic diet      Diet ADULT DIET; Regular; 4 carb choices (60 gm/meal)  ADULT ORAL NUTRITION SUPPLEMENT; Breakfast, Dinner; Low Calorie/High Protein Oral Supplement   DVT Prophylaxis [x] Lovenox, []  Heparin, [] SCDs, [] Ambulation,  [] Eliquis, [] Xarelto  [] Coumadin   Code Status Full Code   Disposition From: Home  Expected Disposition: Home versus SNF  Estimated Date of Discharge: TBD  Patient requires continued admission due to IV antibiotics and hematuria   Surrogate Decision Maker/ POA Son     Subjective:     Chief Complaint: Fatigue (General weakness started this am. Family called because pt had changed from baseline. Currently being tx for UTI. Pt is slightly confused, doesn't know the year. He has been N/V. )     Patient seen and examined at bedside, patient looks weak and edematous, urine output minimal with hematuria, denies any complaints      Review of Systems:    Review of Systems    Constitutional: fever +, generalized weakness, no chills  HEENT: No headache no dizziness  Cardiovascular: No chest pain no palpitations  Respiratory: No cough no shortness of breath  Abdomen: No diarrhea, no nausea, no vomiting, no abdominal pain  Musculoskeletal: No swelling  Neuro: No numbness or tingling  Skin: No rash      Objective: Intake/Output Summary (Last 24 hours) at 9/11/2022 1424  Last data filed at 9/11/2022 1200  Gross per 24 hour   Intake 240 ml   Output 2900 ml   Net -2660 ml        Vitals:   Vitals:    09/11/22 1200   BP: (!) 114/49   Pulse: 71   Resp: 11   Temp:    SpO2:        Physical Exam:     General: febrile, looks weak and ill and edematous, no distress  Eyes: EOMI  ENT: neck supple  Cardiovascular: S1-S2 normal no murmur  Respiratory:  Air entry good bilaterally no wheezing no crackles  Gastrointestinal: Soft, non tender  Genitourinary: no suprapubic tenderness but urinary bag with bloody urine  Musculoskeletal: Swollen right, left and lower upper extremity  Skin: warm, dry  Neuro: Alert. Psych: Mood appropriate.      Medications:   Medications:    magnesium sulfate  4,000 mg IntraVENous Once    insulin glargine  10 Units SubCUTAneous Nightly    insulin lispro  0-8 Units SubCUTAneous TID WC    Cefiderocol Sulfate Tosylate  1,000 mg IntraVENous Q8H    fluconazole  400 mg IntraVENous Q24H    vancomycin (VANCOCIN) intermittent dosing (placeholder)   Other RX Placeholder    lidocaine PF  5 mL IntraDERmal Once    sodium chloride flush  5-40 mL IntraVENous 2 times per day    sodium chloride flush  5-40 mL IntraVENous 2 times per day    meperidine  12.5 mg IntraVENous Once    ferrous sulfate  325 mg Oral BID WC    carvedilol  6.25 mg Oral BID WC    lidocaine  1 patch TransDERmal Daily    insulin lispro  0-4 Units SubCUTAneous 2 times per day    amLODIPine  10 mg Oral Daily    aspirin  81 mg Oral Daily    atorvastatin  80 mg Oral Nightly    levothyroxine  75 mcg Oral Daily    melatonin  10 mg Oral Nightly    pantoprazole  40 mg Oral QAM AC    sodium chloride flush  5-40 mL IntraVENous 2 times per day    [Held by provider] enoxaparin  40 mg SubCUTAneous QPM      Infusions:    dextrose 250 mL (09/11/22 0945)    sodium chloride      sodium chloride      sodium chloride      dextrose      sodium chloride 25 mL (09/06/22 4087)    dextrose       PRN Meds: bisacodyl, 10 mg, Daily PRN  docusate sodium, 100 mg, BID PRN  polyethylene glycol, 17 g, Daily PRN  sodium chloride, , PRN  sodium chloride flush, 5-40 mL, PRN  sodium chloride, , PRN  sodium chloride flush, 5-40 mL, PRN  sodium chloride, 25 mL, PRN  HYDROmorphone, 0.25 mg, Q5 Min PRN  fentanNYL, 50 mcg, Q5 Min PRN  glucose, 4 tablet, PRN  dextrose bolus, 125 mL, PRN   Or  dextrose bolus, 250 mL, PRN  glucagon (rDNA), 1 mg, PRN  dextrose, , Continuous PRN  guaiFENesin-dextromethorphan, 5 mL, Q4H PRN  magnesium hydroxide, 30 mL, Daily PRN  sodium chloride flush, 5-40 mL, PRN  sodium chloride, , PRN  ondansetron, 4 mg, Q8H PRN   Or  ondansetron, 4 mg, Q6H PRN  aluminum & magnesium hydroxide-simethicone, 30 mL, Q6H PRN  acetaminophen, 650 mg, Q6H PRN   Or  acetaminophen, 650 mg, Q6H PRN  glucose, 4 tablet, PRN  dextrose bolus, 125 mL, PRN   Or  dextrose bolus, 250 mL, PRN  glucagon (rDNA), 1 mg, PRN  dextrose, , Continuous PRN  ipratropium-albuterol, 1 vial, BID PRN      Labs      Recent Results (from the past 24 hour(s))   POCT Glucose    Collection Time: 09/10/22  5:01 PM   Result Value Ref Range    POC Glucose 108 (H) 70 - 99 MG/DL   POCT Glucose    Collection Time: 09/10/22  8:08 PM   Result Value Ref Range    POC Glucose 175 (H) 70 - 99 MG/DL   POCT Glucose    Collection Time: 09/11/22  1:54 AM   Result Value Ref Range    POC Glucose 172 (H) 70 - 99 MG/DL   Magnesium    Collection Time: 09/11/22  6:40 AM   Result Value Ref Range    Magnesium 1.6 (L) 1.8 - 2.4 mg/dl   CBC with Auto Differential    Collection Time: 09/11/22  6:40 AM   Result Value Ref Range    WBC 9.3 4.0 - 10.5 K/CU MM    RBC 2.41 (L) 4.6 - 6.2 M/CU MM    Hemoglobin 7.1 (L) 13.5 - 18.0 GM/DL    Hematocrit 22.3 (L) 42 - 52 %    MCV 92.5 78 - 100 FL    MCH 29.5 27 - 31 PG    MCHC 31.8 (L) 32.0 - 36.0 %    RDW 15.8 (H) 11.7 - 14.9 %    Platelets 709 063 - 266 K/CU MM    MPV 10.6 7.5 - 11.1 FL    Differential Type AUTOMATED DIFFERENTIAL     Segs Relative 75.2 (H) 36 - 66 %    Lymphocytes % 11.1 (L) 24 - 44 %    Monocytes % 6.8 (H) 0 - 4 %    Eosinophils % 5.9 (H) 0 - 3 %    Basophils % 0.5 0 - 1 %    Segs Absolute 7.0 K/CU MM    Lymphocytes Absolute 1.0 K/CU MM    Monocytes Absolute 0.6 K/CU MM    Eosinophils Absolute 0.6 K/CU MM    Basophils Absolute 0.1 K/CU MM    Nucleated RBC % 0.0 %    Total Nucleated RBC 0.0 K/CU MM    Total Immature Neutrophil 0.05 K/CU MM    Immature Neutrophil % 0.5 (H) 0 - 0.43 %   Comprehensive Metabolic Panel    Collection Time: 09/11/22  6:40 AM   Result Value Ref Range    Sodium 137 135 - 145 MMOL/L    Potassium 3.6 3.5 - 5.1 MMOL/L    Chloride 103 99 - 110 mMol/L    CO2 23 21 - 32 MMOL/L    BUN 34 (H) 6 - 23 MG/DL    Creatinine 3.4 (H) 0.9 - 1.3 MG/DL    Glucose 179 (H) 70 - 99 MG/DL    Calcium 7.7 (L) 8.3 - 10.6 MG/DL    Albumin 2.3 (L) 3.4 - 5.0 GM/DL    Total Protein 4.5 (L) 6.4 - 8.2 GM/DL    Total Bilirubin 0.2 0.0 - 1.0 MG/DL    ALT 6 (L) 10 - 40 U/L    AST 23 15 - 37 IU/L    Alkaline Phosphatase 86 40 - 128 IU/L    GFR Non- 17 (L) >60 mL/min/1.73m2    GFR  21 (L) >60 mL/min/1.73m2    Anion Gap 11 4 - 16   Phosphorus    Collection Time: 09/11/22  6:40 AM   Result Value Ref Range    Phosphorus 3.3 2.5 - 4.9 MG/DL   POCT Glucose    Collection Time: 09/11/22 11:53 AM   Result Value Ref Range    POC Glucose 256 (H) 70 - 99 MG/DL   Urinalysis    Collection Time: 09/11/22 12:34 PM   Result Value Ref Range    Color, UA BROWN (A) YELLOW    Clarity, UA TURBID (A) CLEAR    Glucose, Urine 250 (A) NEGATIVE MG/DL    Bilirubin Urine MODERATE (A) NEGATIVE MG/DL    Ketones, Urine TRACE (A) NEGATIVE MG/DL    Specific Gravity, UA 1.010 1.001 - 1.035    Blood, Urine LARGE (A) NEGATIVE    pH, Urine 6.5 5.0 - 8.0    Protein,  (A) NEGATIVE MG/DL    Urobilinogen, Urine 2.0 (H) 0.2 - 1.0 MG/DL    Nitrite Urine, Quantitative POSITIVE (A) NEGATIVE    Leukocyte Esterase, Urine LARGE (A) NEGATIVE    RBC, UA 3,866 (H) 0 - 3 /HPF    WBC,  (H) 0 - 2 /HPF    Bacteria, UA NEGATIVE NEGATIVE /HPF    WBC Clumps, UA MANY /HPF    Trichomonas, UA NONE SEEN NONE SEEN /HPF   Protein / creatinine ratio, urine    Collection Time: 09/11/22 12:34 PM   Result Value Ref Range    Urine Total Protein 58.7 (H) <12 MG/DL    Creatinine, Ur 18.9 (L) 39 - 259 MG/DL    Prot/Creat Ratio, Ur 3.1 (H) <0.2   Sodium, urine, random    Collection Time: 09/11/22 12:34 PM   Result Value Ref Range    Sodium, Ur 121 35 - 167 MMOL/L        Imaging/Diagnostics Last 24 Hours   No results found.     Electronically signed by Madina Morgan MD on 9/11/2022 at 2:17 PM

## 2022-09-11 NOTE — CONSULTS
[address]  consulted by Dr. Alonzo Montgomery for monitoring and adjustment. Indication for treatment: Sepsis of unknown etiology  Goal trough: [] 10-15 mcg/mL or [x] 15-20 mcg/ml  AUC/USMAN: [] <500 or [x] 400-600    Pertinent Laboratory Values:   Temp Readings from Last 3 Encounters:   09/11/22 98.2 °F (36.8 °C) (Oral)   05/25/22 97.2 °F (36.2 °C) (Infrared)   04/22/22 97.5 °F (36.4 °C) (Axillary)     Recent Labs     09/09/22  0408 09/09/22  0920 09/10/22  0532 09/11/22  0640   WBC 23.6*  --  13.1* 9.3   LACTATE  --  1.0  --   --        Recent Labs     09/09/22  0408 09/10/22  0450 09/11/22  0640   BUN 27* 28* 34*   CREATININE 2.1* 2.8* 3.4*       Estimated Creatinine Clearance: 18 mL/min (A) (based on SCr of 3.4 mg/dL (H)). Intake/Output Summary (Last 24 hours) at 9/11/2022 1722  Last data filed at 9/11/2022 1632  Gross per 24 hour   Intake 480 ml   Output 2250 ml   Net -1770 ml         Pertinent Cultures:  Date    Source    Results  8/28   Blood    ESBL Ecoli (4/4)  8/30   Blood    ESBL Ecoli (1/4)  9/1   Blood    NGTD  9/9   Urine    C. Albicans, low col #    Vancomycin level:   TROUGH:  No results for input(s): VANCOTROUGH in the last 72 hours.   RANDOM:    Recent Labs     09/10/22  0450   VANCORANDOM 15.7         Assessment:  SCr, BUN, and urine output:   PAMELLA on CKD3, Scr with increase in trends, up to 3.4  Baseline Scr 1.6  Day(s) of therapy: 3 of 7 (last dose on 9/16)  Vancomycin concentration:   9/10: 15.7, collected 13h post-dose, appropriate to re-dose    Plan:  Continue intermittent vancomycin dosing with PAMELLA and CKD  No levels or supplemental vancomycin required today  Repeat next level on Monday AM (anticipate >24h interval)  Pharmacy will continue to monitor patient and adjust therapy as indicated    [address] 9/12 @ 0600    Thank you for the consult,  TALHA Guevara [facility] [place], PharmD  9/11/2022 5:22 PM

## 2022-09-12 ENCOUNTER — APPOINTMENT (OUTPATIENT)
Dept: INTERVENTIONAL RADIOLOGY/VASCULAR | Age: 82
DRG: 853 | End: 2022-09-12
Payer: MEDICARE

## 2022-09-12 LAB
ALBUMIN SERPL-MCNC: 2.3 GM/DL (ref 3.4–5)
ALP BLD-CCNC: 95 IU/L (ref 40–128)
ALT SERPL-CCNC: 7 U/L (ref 10–40)
ANION GAP SERPL CALCULATED.3IONS-SCNC: 12 MMOL/L (ref 4–16)
APTT: 37.6 SECONDS (ref 25.1–37.1)
AST SERPL-CCNC: 23 IU/L (ref 15–37)
BASOPHILS ABSOLUTE: 0.1 K/CU MM
BASOPHILS RELATIVE PERCENT: 0.7 % (ref 0–1)
BILIRUB SERPL-MCNC: 0.2 MG/DL (ref 0–1)
BUN BLDV-MCNC: 38 MG/DL (ref 6–23)
CALCIUM SERPL-MCNC: 7.8 MG/DL (ref 8.3–10.6)
CHLORIDE BLD-SCNC: 103 MMOL/L (ref 99–110)
CO2: 24 MMOL/L (ref 21–32)
CREAT SERPL-MCNC: 3.6 MG/DL (ref 0.9–1.3)
DIFFERENTIAL TYPE: ABNORMAL
DOSE AMOUNT: NORMAL
DOSE TIME: NORMAL
EOSINOPHILS ABSOLUTE: 0.6 K/CU MM
EOSINOPHILS RELATIVE PERCENT: 7.9 % (ref 0–3)
FIBRINOGEN LEVEL: 682 MG/DL (ref 196.9–442.1)
GFR AFRICAN AMERICAN: 20 ML/MIN/1.73M2
GFR NON-AFRICAN AMERICAN: 16 ML/MIN/1.73M2
GLUCOSE BLD-MCNC: 183 MG/DL (ref 70–99)
GLUCOSE BLD-MCNC: 195 MG/DL (ref 70–99)
GLUCOSE BLD-MCNC: 195 MG/DL (ref 70–99)
GLUCOSE BLD-MCNC: 222 MG/DL (ref 70–99)
GLUCOSE BLD-MCNC: 240 MG/DL (ref 70–99)
GLUCOSE BLD-MCNC: 251 MG/DL (ref 70–99)
HCT VFR BLD CALC: 20.7 % (ref 42–52)
HCT VFR BLD CALC: 25.9 % (ref 42–52)
HEMOGLOBIN: 6.6 GM/DL (ref 13.5–18)
HEMOGLOBIN: 8.3 GM/DL (ref 13.5–18)
IMMATURE NEUTROPHIL %: 0.5 % (ref 0–0.43)
INR BLD: 1.1 INDEX
LV EF: 60 %
LVEF MODALITY: NORMAL
LYMPHOCYTES ABSOLUTE: 1.3 K/CU MM
LYMPHOCYTES RELATIVE PERCENT: 17.2 % (ref 24–44)
MCH RBC QN AUTO: 29.6 PG (ref 27–31)
MCHC RBC AUTO-ENTMCNC: 31.9 % (ref 32–36)
MCV RBC AUTO: 92.8 FL (ref 78–100)
MONOCYTES ABSOLUTE: 0.7 K/CU MM
MONOCYTES RELATIVE PERCENT: 9.2 % (ref 0–4)
NUCLEATED RBC %: 0 %
PDW BLD-RTO: 15.8 % (ref 11.7–14.9)
PLATELET # BLD: 287 K/CU MM (ref 140–440)
PMV BLD AUTO: 10.3 FL (ref 7.5–11.1)
POTASSIUM SERPL-SCNC: 3.4 MMOL/L (ref 3.5–5.1)
PROTHROMBIN TIME: 14.2 SECONDS (ref 11.7–14.5)
RBC # BLD: 2.23 M/CU MM (ref 4.6–6.2)
SEGMENTED NEUTROPHILS ABSOLUTE COUNT: 4.8 K/CU MM
SEGMENTED NEUTROPHILS RELATIVE PERCENT: 64.5 % (ref 36–66)
SODIUM BLD-SCNC: 139 MMOL/L (ref 135–145)
TOTAL IMMATURE NEUTOROPHIL: 0.04 K/CU MM
TOTAL NUCLEATED RBC: 0 K/CU MM
TOTAL PROTEIN: 4.7 GM/DL (ref 6.4–8.2)
VANCOMYCIN RANDOM: 14.8 UG/ML
WBC # BLD: 7.5 K/CU MM (ref 4–10.5)

## 2022-09-12 PROCEDURE — 80053 COMPREHEN METABOLIC PANEL: CPT

## 2022-09-12 PROCEDURE — 6360000002 HC RX W HCPCS: Performed by: STUDENT IN AN ORGANIZED HEALTH CARE EDUCATION/TRAINING PROGRAM

## 2022-09-12 PROCEDURE — 6370000000 HC RX 637 (ALT 250 FOR IP): Performed by: SPECIALIST

## 2022-09-12 PROCEDURE — 6360000002 HC RX W HCPCS: Performed by: INTERNAL MEDICINE

## 2022-09-12 PROCEDURE — 87081 CULTURE SCREEN ONLY: CPT

## 2022-09-12 PROCEDURE — 36410 VNPNXR 3YR/> PHY/QHP DX/THER: CPT

## 2022-09-12 PROCEDURE — 2709999900 HC NON-CHARGEABLE SUPPLY

## 2022-09-12 PROCEDURE — 93306 TTE W/DOPPLER COMPLETE: CPT

## 2022-09-12 PROCEDURE — P9016 RBC LEUKOCYTES REDUCED: HCPCS

## 2022-09-12 PROCEDURE — 2580000003 HC RX 258: Performed by: INTERNAL MEDICINE

## 2022-09-12 PROCEDURE — 05HF33Z INSERTION OF INFUSION DEVICE INTO LEFT CEPHALIC VEIN, PERCUTANEOUS APPROACH: ICD-10-PCS | Performed by: STUDENT IN AN ORGANIZED HEALTH CARE EDUCATION/TRAINING PROGRAM

## 2022-09-12 PROCEDURE — 6370000000 HC RX 637 (ALT 250 FOR IP): Performed by: INTERNAL MEDICINE

## 2022-09-12 PROCEDURE — 80202 ASSAY OF VANCOMYCIN: CPT

## 2022-09-12 PROCEDURE — 2060000000 HC ICU INTERMEDIATE R&B

## 2022-09-12 PROCEDURE — 87071 CULTURE AEROBIC QUANT OTHER: CPT

## 2022-09-12 PROCEDURE — 2580000003 HC RX 258: Performed by: STUDENT IN AN ORGANIZED HEALTH CARE EDUCATION/TRAINING PROGRAM

## 2022-09-12 PROCEDURE — 82272 OCCULT BLD FECES 1-3 TESTS: CPT

## 2022-09-12 PROCEDURE — 99232 SBSQ HOSP IP/OBS MODERATE 35: CPT | Performed by: NURSE PRACTITIONER

## 2022-09-12 PROCEDURE — 94761 N-INVAS EAR/PLS OXIMETRY MLT: CPT

## 2022-09-12 PROCEDURE — 2580000003 HC RX 258: Performed by: ANESTHESIOLOGY

## 2022-09-12 PROCEDURE — 85014 HEMATOCRIT: CPT

## 2022-09-12 PROCEDURE — 87040 BLOOD CULTURE FOR BACTERIA: CPT

## 2022-09-12 PROCEDURE — 36430 TRANSFUSION BLD/BLD COMPNT: CPT

## 2022-09-12 PROCEDURE — 85610 PROTHROMBIN TIME: CPT

## 2022-09-12 PROCEDURE — 2580000003 HC RX 258: Performed by: SPECIALIST

## 2022-09-12 PROCEDURE — 85025 COMPLETE CBC W/AUTO DIFF WBC: CPT

## 2022-09-12 PROCEDURE — 85018 HEMOGLOBIN: CPT

## 2022-09-12 PROCEDURE — 85384 FIBRINOGEN ACTIVITY: CPT

## 2022-09-12 PROCEDURE — 82962 GLUCOSE BLOOD TEST: CPT

## 2022-09-12 PROCEDURE — 6360000002 HC RX W HCPCS: Performed by: NURSE PRACTITIONER

## 2022-09-12 PROCEDURE — 76937 US GUIDE VASCULAR ACCESS: CPT

## 2022-09-12 PROCEDURE — 2580000003 HC RX 258: Performed by: NURSE PRACTITIONER

## 2022-09-12 PROCEDURE — 85730 THROMBOPLASTIN TIME PARTIAL: CPT

## 2022-09-12 RX ORDER — INSULIN GLARGINE 100 [IU]/ML
15 INJECTION, SOLUTION SUBCUTANEOUS NIGHTLY
Status: DISCONTINUED | OUTPATIENT
Start: 2022-09-12 | End: 2022-09-16

## 2022-09-12 RX ORDER — INSULIN LISPRO 100 [IU]/ML
0-8 INJECTION, SOLUTION INTRAVENOUS; SUBCUTANEOUS
Status: DISCONTINUED | OUTPATIENT
Start: 2022-09-12 | End: 2022-09-21 | Stop reason: HOSPADM

## 2022-09-12 RX ORDER — SODIUM CHLORIDE 9 MG/ML
INJECTION, SOLUTION INTRAVENOUS PRN
Status: DISCONTINUED | OUTPATIENT
Start: 2022-09-12 | End: 2022-09-19

## 2022-09-12 RX ORDER — POTASSIUM CHLORIDE 20 MEQ/1
40 TABLET, EXTENDED RELEASE ORAL ONCE
Status: COMPLETED | OUTPATIENT
Start: 2022-09-12 | End: 2022-09-12

## 2022-09-12 RX ADMIN — CARVEDILOL 6.25 MG: 6.25 TABLET, FILM COATED ORAL at 17:36

## 2022-09-12 RX ADMIN — Medication 10 ML: at 09:40

## 2022-09-12 RX ADMIN — SODIUM CHLORIDE, PRESERVATIVE FREE 10 ML: 5 INJECTION INTRAVENOUS at 22:25

## 2022-09-12 RX ADMIN — CEFIDEROCOL SULFATE TOSYLATE 1000 MG: 1 INJECTION, POWDER, FOR SOLUTION INTRAVENOUS at 02:22

## 2022-09-12 RX ADMIN — SODIUM CHLORIDE, PRESERVATIVE FREE 10 ML: 5 INJECTION INTRAVENOUS at 09:41

## 2022-09-12 RX ADMIN — Medication 10 MG: at 22:09

## 2022-09-12 RX ADMIN — POTASSIUM CHLORIDE 40 MEQ: 1500 TABLET, EXTENDED RELEASE ORAL at 11:54

## 2022-09-12 RX ADMIN — AMLODIPINE BESYLATE 10 MG: 10 TABLET ORAL at 09:39

## 2022-09-12 RX ADMIN — ATORVASTATIN CALCIUM 80 MG: 40 TABLET, FILM COATED ORAL at 22:09

## 2022-09-12 RX ADMIN — CEFIDEROCOL SULFATE TOSYLATE 1000 MG: 1 INJECTION, POWDER, FOR SOLUTION INTRAVENOUS at 11:55

## 2022-09-12 RX ADMIN — ASPIRIN 81 MG CHEWABLE TABLET 81 MG: 81 TABLET CHEWABLE at 09:39

## 2022-09-12 RX ADMIN — INSULIN LISPRO 2 UNITS: 100 INJECTION, SOLUTION INTRAVENOUS; SUBCUTANEOUS at 22:19

## 2022-09-12 RX ADMIN — FERROUS SULFATE TAB 325 MG (65 MG ELEMENTAL FE) 325 MG: 325 (65 FE) TAB at 09:39

## 2022-09-12 RX ADMIN — FLUCONAZOLE 400 MG: 2 INJECTION, SOLUTION INTRAVENOUS at 09:48

## 2022-09-12 RX ADMIN — CEFIDEROCOL SULFATE TOSYLATE 1000 MG: 1 INJECTION, POWDER, FOR SOLUTION INTRAVENOUS at 19:03

## 2022-09-12 RX ADMIN — PANTOPRAZOLE SODIUM 40 MG: 40 TABLET, DELAYED RELEASE ORAL at 05:40

## 2022-09-12 RX ADMIN — INSULIN GLARGINE 15 UNITS: 100 INJECTION, SOLUTION SUBCUTANEOUS at 22:09

## 2022-09-12 RX ADMIN — DEXTROSE MONOHYDRATE 200 MG: 50 INJECTION, SOLUTION INTRAVENOUS at 15:21

## 2022-09-12 RX ADMIN — DEXTROSE MONOHYDRATE 250 ML: 100 INJECTION, SOLUTION INTRAVENOUS at 02:03

## 2022-09-12 RX ADMIN — CARVEDILOL 6.25 MG: 6.25 TABLET, FILM COATED ORAL at 09:39

## 2022-09-12 RX ADMIN — FERROUS SULFATE TAB 325 MG (65 MG ELEMENTAL FE) 325 MG: 325 (65 FE) TAB at 17:36

## 2022-09-12 RX ADMIN — INSULIN LISPRO 2 UNITS: 100 INJECTION, SOLUTION INTRAVENOUS; SUBCUTANEOUS at 11:58

## 2022-09-12 RX ADMIN — LEVOTHYROXINE SODIUM 75 MCG: 75 TABLET ORAL at 05:40

## 2022-09-12 RX ADMIN — SODIUM CHLORIDE, PRESERVATIVE FREE 10 ML: 5 INJECTION INTRAVENOUS at 09:40

## 2022-09-12 ASSESSMENT — PAIN SCALES - WONG BAKER: WONGBAKER_NUMERICALRESPONSE: 0

## 2022-09-12 ASSESSMENT — PAIN DESCRIPTION - LOCATION: LOCATION: HAND

## 2022-09-12 ASSESSMENT — PAIN SCALES - GENERAL: PAINLEVEL_OUTOF10: 8

## 2022-09-12 NOTE — PROGRESS NOTES
Nephrology Progress Note  9/12/2022 9:16 AM        Subjective:   Admit Date: 8/28/2022  PCP: Michelet Gutierrez MD    Interval History:  patient seen in early morning, this is a late entry   his hemoglobin dropped    Diet:  reported eating some    ROS:   no overt PND, shortness of breath or orthopnea   urine output almost 2-1/2 L for the last 24 hours   no fever    Data:     Current meds:    insulin glargine  15 Units SubCUTAneous Nightly    insulin lispro  0-8 Units SubCUTAneous 2 times per day    insulin lispro  0-8 Units SubCUTAneous TID WC    Cefiderocol Sulfate Tosylate  1,000 mg IntraVENous Q8H    fluconazole  400 mg IntraVENous Q24H    lidocaine PF  5 mL IntraDERmal Once    sodium chloride flush  5-40 mL IntraVENous 2 times per day    sodium chloride flush  5-40 mL IntraVENous 2 times per day    meperidine  12.5 mg IntraVENous Once    ferrous sulfate  325 mg Oral BID WC    carvedilol  6.25 mg Oral BID WC    lidocaine  1 patch TransDERmal Daily    amLODIPine  10 mg Oral Daily    aspirin  81 mg Oral Daily    atorvastatin  80 mg Oral Nightly    levothyroxine  75 mcg Oral Daily    melatonin  10 mg Oral Nightly    pantoprazole  40 mg Oral QAM AC    sodium chloride flush  5-40 mL IntraVENous 2 times per day    [Held by provider] enoxaparin  40 mg SubCUTAneous QPM      sodium chloride      sodium chloride      sodium chloride      sodium chloride      dextrose      sodium chloride 25 mL (09/06/22 3599)    dextrose           I/O last 3 completed shifts:   In: 480 [P.O.:480]  Out: 3550 [ILAVQ:4142]    CBC:   Recent Labs     09/10/22  0532 09/10/22  1328 09/11/22  0640 09/12/22  0545   WBC 13.1*  --  9.3 7.5   HGB 6.0* 7.3* 7.1* 6.6*     --  314 287          Recent Labs     09/10/22  0450 09/11/22  0640 09/12/22  0545    137 139   K 3.8 3.6 3.4*    103 103   CO2 25 23 24   BUN 28* 34* 38*   CREATININE 2.8* 3.4* 3.6*   GLUCOSE 72 179* 183*       Lab Results   Component Value Date    CALCIUM 7.8 (L) 09/12/2022    PHOS 3.3 09/11/2022       Objective:     Vitals: BP (!) 116/48   Pulse 66   Temp 97.4 °F (36.3 °C) (Oral)   Resp 14   Ht 5' 8\" (1.727 m)   Wt 210 lb 15.4 oz (95.7 kg)   SpO2 98%   BMI 32.08 kg/m² ,    General appearance:   alert, awake and oriented  HEENT:   positive conjunctival pallor  Neck:   supple- right IJ central venous catheter  Lungs:   Limited exam, some adventitious breath sound  Heart:   regular rate and rhythm with systolic ejection murmur  Abdomen:  soft, suprapubic catheter  Extremities:   positive leg edema      Problem List :         Impression :      acute kidney injury with underlying CKD stage III- fortunately good urine output- without diuretics now- creatinine still up going- but expected to plateau   hypokalemia likely from recent diuresis   multifactoral anemia including  from hematuria   prostate cancer and diabetes and recurrent left ureteral obstruction of unknown etiology, stent was exchanged not too long ago- causing urinary tract infection    Recommendation/Plan  :      repeat potassium   we will continue to hold the diuretics   he may need some transfusion   watch for iatrogenic and nosocomial complication   follow clinically and biochemically      Nikita Leal MD MD

## 2022-09-12 NOTE — PROGRESS NOTES
Progress Note( Dr. Aleja Fatima)  9/12/2022  Subjective:   Admit Date: 8/28/2022  PCP: Chet House MD    Admitted For :Fatigue and possible sepsis from UTI    Consulted For: Control of blood glucose as patient has having hypoglycemic episodes    Interval History: feeling Somewhat  tired  had  no more episode of low blood glucose yesterday  Patient had an episode of a fever with a temperature of 102 felt very dizzy and confused patient was worked up for sepsis and moved to stepdown care ICU on evening of 9/9/2022  Is doing somewhat better more alert and awake to himself    Pt had Following Urology procedure done early morng hour spf 9/8/2022      Cystoscopy, left ureteral stent exchange,  suprapubic tube exchange. Denies any chest pains,   Denies SOB . Denies nausea or vomiting. now eating better  No new bowel or bladder symptoms.        Intake/Output Summary (Last 24 hours) at 9/12/2022 0757  Last data filed at 9/12/2022 0549  Gross per 24 hour   Intake 480 ml   Output 2450 ml   Net -1970 ml         DATA    CBC:   Recent Labs     09/10/22  0532 09/10/22  1328 09/11/22  0640 09/12/22  0545   WBC 13.1*  --  9.3 7.5   HGB 6.0* 7.3* 7.1* 6.6*     --  314 287      CMP:  Recent Labs     09/10/22  0450 09/11/22  0640    137   K 3.8 3.6    103   CO2 25 23   BUN 28* 34*   CREATININE 2.8* 3.4*   CALCIUM 7.9* 7.7*   PROT 5.5* 4.5*   LABALBU 2.2* 2.3*   BILITOT 0.2 0.2   ALKPHOS 84 86   AST 15 23   ALT <5* 6*       Lipids: No results found for: CHOL, HDL, TRIG  Glucose:  Recent Labs     09/11/22  1621 09/11/22  2039 09/12/22  0200   POCGLU 214* 237* 251*       [id number]:  Lab Results   Component Value Date/Time    LABA1C 7.8 08/29/2022 01:30 AM     High Sensitivity TSH:   Lab Results   Component Value Date/Time    TSHHS 7.590 04/28/2022 06:59 AM     Free T3: No results found for: FT3  Free T4:  Lab Results   Component Value Date/Time    T4FREE 1.15 03/27/2022 08:22 AM       CT ABDOMEN PELVIS WO CONTRAST Additional Contrast? None   Final Result   1. Probable hematuria left urinary collecting system (possible active   hemorrhage into the left urinary collecting system) and mild-to-moderate left   hydronephrosis. 2. The double-J stent appears in unremarkable position without left ureter   calculus evident. 3. Trace abdominopelvic ascites is probably reactive. 4.  Vascular calcifications are noted reflecting calcific atherosclerosis. 5. Trace left pleural effusion with left basilar relaxation atelectasis or   infiltrate. 6. Minimal subsegmental atelectasis posterior right lung base. 7. Small hiatal hernia. XR CHEST 1 VIEW   Final Result   1. Right IJ line with the tip overlying the distal SVC/right atrial region. 2. Low lung volumes with bibasilar atelectasis. No significant change. XR CHEST PORTABLE   Final Result   Satisfactory position of the PICC. No acute cardiopulmonary process         VL DUP UPPER EXTREMITY VENOUS RIGHT   Final Result   No evidence of DVT within the right upper extremity. XR CHEST PORTABLE   Final Result   No acute process. XR ABDOMEN (KUB) (SINGLE AP VIEW)   Final Result   Prominent loops of bowel may represent persistent ileus or partial   obstruction. FL LESS THAN 1 HOUR   Final Result      XR CHEST PORTABLE   Final Result   No abnormalities noted. US ABDOMEN LIMITED Specify organ? LIVER, GALLBLADDER   Final Result   Unremarkable right upper quadrant ultrasound. CT ABDOMEN PELVIS WO CONTRAST Additional Contrast? None   Final Result   1. Left ureteral stent in place. Mild bilateral urinary tract dilatation   with new right perinephric and periureteral stranding raises concern for   urinary tract infection. No urinary stones. 2.  Suprapubic catheter in the decompressed urinary bladder.       3.  Ill-defined stranding/fluid throughout the retroperitoneum may relate to   vascular congestion or reactive changes. No intraperitoneal free air or   abscess. XR CHEST PORTABLE   Final Result   Increased interstitial opacity. While much or all of this may be chronic,   please correlate with any clinical evidence of acute interstitial edema.               Scheduled Medicines   Medications:    insulin glargine  15 Units SubCUTAneous Nightly    insulin lispro  0-8 Units SubCUTAneous 2 times per day    insulin lispro  0-8 Units SubCUTAneous TID WC    Cefiderocol Sulfate Tosylate  1,000 mg IntraVENous Q8H    fluconazole  400 mg IntraVENous Q24H    vancomycin (VANCOCIN) intermittent dosing (placeholder)   Other RX Placeholder    lidocaine PF  5 mL IntraDERmal Once    sodium chloride flush  5-40 mL IntraVENous 2 times per day    sodium chloride flush  5-40 mL IntraVENous 2 times per day    meperidine  12.5 mg IntraVENous Once    ferrous sulfate  325 mg Oral BID WC    carvedilol  6.25 mg Oral BID WC    lidocaine  1 patch TransDERmal Daily    amLODIPine  10 mg Oral Daily    aspirin  81 mg Oral Daily    atorvastatin  80 mg Oral Nightly    levothyroxine  75 mcg Oral Daily    melatonin  10 mg Oral Nightly    pantoprazole  40 mg Oral QAM AC    sodium chloride flush  5-40 mL IntraVENous 2 times per day    [Held by provider] enoxaparin  40 mg SubCUTAneous QPM      Infusions:    sodium chloride      dextrose 250 mL (09/12/22 0203)    sodium chloride      sodium chloride      sodium chloride      dextrose      sodium chloride 25 mL (09/06/22 0656)    dextrose           Objective:   Vitals: /61   Pulse 67   Temp 96.9 °F (36.1 °C) (Oral)   Resp 14   Ht 5' 8\" (1.727 m)   Wt 210 lb 15.4 oz (95.7 kg)   SpO2 98%   BMI 32.08 kg/m²   General appearance: alert and cooperative with exam  Neck: no JVD or bruit  Thyroid : Normal lobes   Lungs: Has Vesicular Breath sounds   Heart:  regular rate and rhythm  Abdomen: soft, non-tender; bowel sounds normal; no masses,  no organomegaly has suprapubic catheter   Musculoskeletal: Normal  Extremities: extremities normal, , no edema  Neurologic:  Awake, alert, oriented to name, place and time. Cranial nerves II-XII are grossly intact. Motor weakness. Sensory neuropathy. ,  and gait is abnormal.  Stable    Assessment:     Patient Active Problem List:     Gait disturbance     Generalized weakness     Diabetes mellitus (HCC)     Osteoarthritis     Anemia of chronic disorder     Infected implanted bladder sphincter cuff     Escherichia coli urinary tract infection     Hypertension     Sepsis ([address]     Acute encephalopathy     Type 2 diabetes mellitus with hypoglycemia without coma ([facility])     UTI (urinary tract infection) due to urinary indwelling catheter ([facility])     Hyperkalemia     Acute kidney failure (HCC)     Leg weakness, bilateral     Type 2 diabetes mellitus with neurological manifestations, uncontrolled ([address]     Complicated UTI (urinary tract infection)     Essential hypertension     Severe muscle deconditioning     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 ([facility])     Frequent falls     Chronic kidney disease, stage 3a ([address]     Uncontrolled type 2 diabetes mellitus with peripheral neuropathy ([facility])     Prostate cancer ([facility])     Suprapubic catheter ([address]     Sepsis due to urinary tract infection ([address]     Coagulase negative Staphylococcus bacteremia     Chronic kidney disease, stage IV (severe) (HCC)     Acute metabolic encephalopathy     SVT (supraventricular tachycardia) (HCC)     Metabolic encephalopathy     History of prostate cancer     Cigarette nicotine dependence without complication     Altered mental status     Serratia marcescens infection     Hypoglycemia         Plan:     Reviewed POC blood glucose .  Labs and X ray results   Reviewed Current Medicines   On Correction bolus Humalog/ Basal Lantus ( On Hold )Insulin regime /   Monitor Blood glucose frequently   Modified  the dose of Insulin/ other medicines as neede  Management is working to show the patient to skilled nursing unit possibly [facility]  Will follow     .      Jennifer Garcia MD, MD

## 2022-09-12 NOTE — PROGRESS NOTES
Notified that Mariajose Lugo RN that floor nurses can pull CVC lines and there is no need for an IR consult. IR will complete consult at this time.

## 2022-09-12 NOTE — CONSULTS
Consult completed. Indication for line: long-term IV Abx infusions; PICC contraindicated to prevent cross-contamination r/t suspicion of CVC colonization; MidLine contraindicated r/t Hx [id number]. EDPIV will meet pt's therapeutic needs while minimizing risks. RUE access contraindicated r/t recent possible Lymphedema. Procedure/rationale explained to pt & consent obtained. #20ga Arrow Endurance Extended Dwell PIV Catheter initiated to LUE anterior medial forearm Cephalic Vein using sterile, UltraSound-guided technique without difficulty/complications. Positioning verified via UltraSound visualization of catheter within vessel lumen; site returns blood briskly and flushes without resistance/abnormalities. Sterile dressing with SkinPrep, StatLock Securing Device, BioPatch, SwabCap, and Limb Precautions band applied. Pt tolerated well & no other c/o or needs noted or reported.

## 2022-09-12 NOTE — PROGRESS NOTES
[facility] [address], [address]   Progress Note  [facility] 0 1 2      Date: 2022   Patient: Silva Freire   : 1940   DOA: 2022   MRN: [id number]   ROOM#: -[id number]     Admit Date: 2022     Collaborating Urologist on Call at time of admission: Dr. Ismael Ibarra  CC: Fatigue  Reason for Consult: Hematuria with acute blood loss anemia  POD #5: Cystoscopy, left ureteral stent exchange, suprapubic tube exchange. Subjective:     Pain: mild, nausea and no vomiting,   Bowel Movement/Flatus: Yes  Voiding: SPT in place, urine dark red    Pt resting in bed, states he is feeling slightly better today. I manually irrigated his bladder with 60cc NS and urine clear by end of irrigation with no clots noted. Objective:    Vitals:    BP (!) 116/48   Pulse 66   Temp 97.4 °F (36.3 °C) (Oral)   Resp 14   Ht 5' 8\" (1.727 m)   Wt 210 lb 15.4 oz (95.7 kg)   SpO2 98%   BMI 32.08 kg/m²    Temp  Av °F (36.7 °C)  Min: 96.9 °F (36.1 °C)  Max: 98.6 °F (37 °C)     Intake/Output Summary (Last 24 hours) at 2022 0687  Last data filed at 2022 0549  Gross per 24 hour   Intake 480 ml   Output 2450 ml   Net -1970 ml     Physical Exam:  General appearance: alert, appears stated age, cooperative, fatigued, no distress, mildly obese and slowed mentation  Head: Normocephalic, without obvious abnormality, atraumatic  Back: No CVA tenderness  Abdomen: Soft, non-tender, non-distended.  SPT in place, urine dark red    Labs:   WBC:    Lab Results   Component Value Date/Time    WBC 7.5 2022 05:45 AM      Hemoglobin/Hematocrit:    Lab Results   Component Value Date/Time    HGB 6.6 2022 05:45 AM    HCT 20.7 2022 05:45 AM      BMP:   Lab Results   Component Value Date/Time     2022 05:45 AM    K 3.4 2022 05:45 AM    K 3.9 2018 04:42 AM     2022 05:45 AM    CO2 24 2022 05:45 AM    BUN 38 2022 05:45 AM    LABALBU 2.3 2022 05:45 AM CREATININE 3.6 09/12/2022 05:45 AM    CALCIUM 7.8 09/12/2022 05:45 AM    GFRAA 20 09/12/2022 05:45 AM    LABGLOM 16 09/12/2022 05:45 AM      PT/INR:    Lab Results   Component Value Date/Time    PROTIME 12.5 04/18/2022 09:51 AM    PROTIME 15.2 01/24/2011 06:48 PM    INR 0.97 04/18/2022 09:51 AM      PTT:    Lab Results   Component Value Date/Time    APTT 33.6 04/18/2022 09:51 AM     Blood Culture: 2/4 bottles +Candida  Urine Culture:  Candida Albicans <10K CFU/ml    Imaging:   CT ABDOMEN PELVIS WO CONTRAST Additional Contrast? None    Result Date: 9/10/2022  EXAMINATION: CT OF THE ABDOMEN AND PELVIS WITHOUT CONTRAST 9/10/2022 4:35 pm TECHNIQUE: CT of the abdomen and pelvis was performed without the administration of intravenous contrast. Multiplanar reformatted images are provided for review. Automated exposure control, iterative reconstruction, and/or weight based adjustment of the mA/kV was utilized to reduce the radiation dose to as low as reasonably achievable. COMPARISON: CT abdomen and pelvis 09/01/2022 and 04/18/2022 HISTORY: ORDERING SYSTEM PROVIDED HISTORY: Recent stent placement/SPT exchange. Worsening fevers/leukocytosis. FINDINGS: Lower Chest: Trace left pleural effusion with left basilar relaxation atelectasis or infiltrate. Minimal subsegmental atelectasis posterior right lung base. Visualized portions of the cardiac and posterior mediastinal structures appear unremarkable with exception of a small hiatal hernia. Organs: Calcifications liver and spleen reflect sequela of old granulomatous disease (benign finding requiring no additional evaluation or follow-up). Normal attenuation throughout the liver. No discrete hepatic lesion or intrahepatic bile duct dilatation is seen. The gallbladder, RIGHT kidney, spleen, adrenal glands and pancreas appear unremarkable.   Relative high density within the left urinary collecting system, mild to moderate severity hydronephrosis with double-J ureter stent in unremarkable position. No calculus evident along the course of the stent. Prominent left perinephric stranding GI/Bowel: Small hiatal hernia. The intra-abdominal stomach and small bowel appear unremarkable. No diffuse or focal small bowel wall thickening or inflammatory changes evident. No obstruction is seen. The appendix is visualized right lower quadrant, unremarkable in appearance. The colon appears unremarkable. Pelvis: Urinary bladder decompressed by suprapubic catheter. Distal portion of left double-J ureter stent appears unremarkable in position. Surgical clips at the expected location of the prostate gland presumably status post prostatectomy. Trace simple appearing pelvic ascites posteriorly. No pneumoperitoneum. Vascular calcifications are noted reflecting calcific atherosclerosis. Peritoneum/Retroperitoneum: Perinephric fatty stranding on the left. Trace simple appearing left pericolic gutter ascites. No pneumoperitoneum. Calcific atherosclerosis aorta and branches without aneurysm. Inferior vena cava appears unremarkable. No adenopathy is seen. Bones/Soft Tissues: No acute superficial soft tissue or osseous structure abnormality evident. No suspicious lytic or blastic lesion evident. 1. Probable hematuria left urinary collecting system (possible active hemorrhage into the left urinary collecting system) and mild-to-moderate left hydronephrosis. 2. The double-J stent appears in unremarkable position without left ureter calculus evident. 3. Trace abdominopelvic ascites is probably reactive. 4.  Vascular calcifications are noted reflecting calcific atherosclerosis. 5. Trace left pleural effusion with left basilar relaxation atelectasis or infiltrate. 6. Minimal subsegmental atelectasis posterior right lung base. 7. Small hiatal hernia.      CT ABDOMEN PELVIS WO CONTRAST Additional Contrast? None    Result Date: 9/1/2022  EXAMINATION: CT OF THE ABDOMEN AND PELVIS WITHOUT CONTRAST 9/1/2022 3:06 pm TECHNIQUE: CT of the abdomen and pelvis was performed without the administration of intravenous contrast. Multiplanar reformatted images are provided for review. Automated exposure control, iterative reconstruction, and/or weight based adjustment of the mA/kV was utilized to reduce the radiation dose to as low as reasonably achievable. COMPARISON: 04/18/2022. HISTORY: ORDERING SYSTEM PROVIDED HISTORY: abdominal pain TECHNOLOGIST PROVIDED HISTORY: Reason for exam:->abdominal pain Additional Contrast?->None Reason for Exam: abdominal pain, POSSIBLE UTI FINDINGS: Lower Chest: The visualized lung bases demonstrate subsegmental atelectasis/scarring. Trace pericardial fluid. Questionable trace bilateral pleural fluid. Atherosclerosis in the visualized thoracic aorta. Coronary artery calcifications. Small hiatal hernia. Liver: Normal. Gallbladder and Bile Ducts: Normal. Spleen: Normal. Adrenal Glands: Normal. Pancreas: Normal. Genitourinary: Left ureteral stent with minimal dilatation of the left urinary tract. Mild dilatation of the right urinary tract. Mild dilatation of the right urinary tract with increased right perinephric and periureteral stranding. No definite urinary stones. Suprapubic catheter in the decompressed urinary bladder. Bowel: Normal caliber bowel. Normal appendix. No significant diverticular disease. Vasculature: Atherosclerosis. Normal caliber abdominal aorta. Bones and Soft Tissues: Fat containing right inguinal hernia. Degenerative changes in the visualized spine and pelvis. Retroperitoneum/Mesentery: No intraperitoneal free air, ascites or fluid collection. No lymphadenopathy in the abdomen or pelvis. Stranding/fluid throughout the retroperitoneum. 1.  Left ureteral stent in place. Mild bilateral urinary tract dilatation with new right perinephric and periureteral stranding raises concern for urinary tract infection. No urinary stones.  2.  Suprapubic catheter in the decompressed urinary bladder. 3.  Ill-defined stranding/fluid throughout the retroperitoneum may relate to vascular congestion or reactive changes. No intraperitoneal free air or abscess. XR ABDOMEN (KUB) (SINGLE AP VIEW)    Result Date: 9/8/2022  EXAMINATION: ONE SUPINE XRAY VIEW(S) OF THE ABDOMEN 9/8/2022 7:35 pm COMPARISON: 09/01/2022 HISTORY: ORDERING SYSTEM PROVIDED HISTORY: evaluate for ileus TECHNOLOGIST PROVIDED HISTORY: Reason for exam:->evaluate for ileus Reason for Exam: evaluate for ileus Additional signs and symptoms: nausea vomiting Relevant Medical/Surgical History: UTI FINDINGS: Lung bases are grossly clear. Dilated gas-filled loops bowel are nonspecific and can represent mild ileus versus partial obstruction. Left-sided double-J ureteral stent partially visualized and evaluated. Hopper catheter identified. Surgical identified within the pelvis noted. There are few scattered pelvic phleboliths. Prominent loops of bowel may represent persistent ileus or partial obstruction. FL LESS THAN 1 HOUR    Result Date: 9/7/2022  Radiology exam is complete. No Radiologist dictation. Please follow up with ordering provider. XR CHEST PORTABLE    Result Date: 9/10/2022  EXAMINATION: ONE XRAY VIEW OF THE CHEST 9/9/2022 10:20 pm COMPARISON: 09/09/2022 at 1018 hours HISTORY: ORDERING SYSTEM PROVIDED HISTORY: Verify left PICC placement TECHNOLOGIST PROVIDED HISTORY: Reason for exam:->Verify left PICC placement Reason for Exam: Verify left PICC placement FINDINGS: Left-sided PICC terminates within the region the confluence of the SVC. The cardiomediastinal silhouette is mildly prominent size. Mild perihilar congestion/interstitial edema. The lungs are clear. No pleural effusion or pneumothorax is present. Satisfactory position of the PICC.   No acute cardiopulmonary process     XR CHEST PORTABLE    Result Date: 9/9/2022  EXAMINATION: ONE XRAY VIEW OF THE CHEST 9/9/2022 10:12 am COMPARISON: 09/06/2022 HISTORY: ORDERING SYSTEM PROVIDED HISTORY: fever TECHNOLOGIST PROVIDED HISTORY: Reason for exam:->fever Reason for Exam: fever FINDINGS: The lungs are without acute focal process. There is no effusion or pneumothorax. The cardiomediastinal silhouette is without acute process. The osseous structures are without acute process. No acute process. XR CHEST PORTABLE    Result Date: 9/6/2022  EXAMINATION: ONE XRAY VIEW OF THE CHEST 9/6/2022 1:57 pm COMPARISON: 08/28/2022 HISTORY: ORDERING SYSTEM PROVIDED HISTORY: cough TECHNOLOGIST PROVIDED HISTORY: Reason for exam:->cough Reason for Exam: cough FINDINGS: The lungs appear clear. There are no pulmonary nodules, masses or infiltrates. There are no pleural effusions and there is no evidence of pneumothorax. The heart and mediastinal structures appear normal.  Bony structures appear unremarkable. No abnormalities noted. XR CHEST PORTABLE    Result Date: 8/28/2022  EXAMINATION: ONE XRAY VIEW OF THE CHEST 8/28/2022 3:37 pm COMPARISON: 04/18/2022 HISTORY: ORDERING SYSTEM PROVIDED HISTORY: emergency TECHNOLOGIST PROVIDED HISTORY: Reason for exam:->emergency Reason for Exam: fatigue, emergency, fever FINDINGS: Increased interstitial opacities seen throughout both lungs. A focal infiltrate is not identified. The cardial pericardial silhouette is unremarkable. No pneumothorax is seen. No free air. No acute bony abnormality. Increased interstitial opacity. While much or all of this may be chronic, please correlate with any clinical evidence of acute interstitial edema.      XR CHEST 1 VIEW    Result Date: 9/10/2022  EXAMINATION: ONE XRAY VIEW OF THE CHEST 9/10/2022 12:11 am COMPARISON: Chest radiograph dated September 9, 2022 HISTORY: ORDERING SYSTEM PROVIDED HISTORY: recent CVL insertion TECHNOLOGIST PROVIDED HISTORY: Reason for exam:->recent CVL insertion Reason for Exam: recent CVL insertion FINDINGS: There has been interval placement of a right IJ line with the tip overlying the distal SVC/right atrial region. The cardiomediastinal silhouette is stable. The previously noted left-sided PICC line has been removed. Bibasilar atelectasis with low lung volumes are noted. There is no significant change. 1. Right IJ line with the tip overlying the distal SVC/right atrial region. 2. Low lung volumes with bibasilar atelectasis. No significant change. US ABDOMEN LIMITED Specify organ? LIVER, GALLBLADDER    Result Date: 9/3/2022  EXAMINATION: RIGHT UPPER QUADRANT ULTRASOUND 9/3/2022 4:43 am COMPARISON: CT 09/01/2022, ultrasound 05/06/2020 HISTORY: ORDERING SYSTEM PROVIDED HISTORY: abdominal pain TECHNOLOGIST PROVIDED HISTORY: Reason for exam:->abdominal pain Specify organ?->LIVER Specify organ?->GALLBLADDER FINDINGS: LIVER:  The liver demonstrates normal echogenicity without evidence of intrahepatic biliary ductal dilatation. BILIARY SYSTEM:  Gallbladder is unremarkable without evidence of pericholecystic fluid, wall thickening or stones. Negative sonographic Lima's sign. Common bile duct is within normal limits measuring 3 mm. RIGHT KIDNEY: The right kidney is grossly unremarkable without evidence of hydronephrosis. PANCREAS:  Visualized portions of the pancreas are unremarkable. OTHER: No evidence of right upper quadrant ascites. Unremarkable right upper quadrant ultrasound. VL DUP UPPER EXTREMITY VENOUS RIGHT    Result Date: 9/9/2022  EXAMINATION: DUPLEX ULTRASOUND OF THE RIGHT UPPER EXTREMITY FOR DVT, 9/9/2022 11:12 am TECHNIQUE: Duplex ultrasound using B-mode/gray scaled imaging and Doppler spectral analysis and color flow was obtained of the deep venous structures of the upper right extremity. COMPARISON: None.  HISTORY: ORDERING SYSTEM PROVIDED HISTORY: swollen right upper extremity TECHNOLOGIST PROVIDED HISTORY: Please do the doppler ultrasound of right upper extremity to r/o DVT Reason for exam:->swollen right upper extremity FINDINGS: There is normal flow and compressibility of the visualized venous structures. There is no evidence of echogenic thrombus. The veins demonstrate good compressibility with normal color flow study and spectral analysis. The exam is suboptimal.  The brachial and basilic veins are not well visualized due to Tegaderm     No evidence of DVT within the right upper extremity. Assessment & Plan:      Nhung Booker is a 78 y.o. male admitted 5/97/3518 for metabolic encephalopathy. 1) Chronic Urinary Retention: managed with suprapubic catheter and exchanged monthly, last exchanged 9/7/22. Recommend SPT exchanges q3 weeks. 2) Chronic Left Hydronephrosis managed with chronic stent: S/p cystoscopy, left ureteral stent exchange, suprapubic tube exchange 9/7/2022  Recommend left ureteral stent exchanges q3 months. RN staff to manually irrigate bladder q4hrs and prn clots. Gross hematuria appears to be resolving. 3) Sepsis w Complicated UTI:              Repeat urine culture < 10 candida  Repeat blood culture- 2/4 yeast              Prior Blood cultures 4/4 +ESBL E.coli              WBC 9.3, afebrile              On Fetroja and fluconazole, per ID       Recommend irrigate SP w saline 100 ml daily. 4) H/o Prostate Cancer: S/p RPP with Dr. Shirlie Aase in [address] [place] [address]. PSA 0.93 2/2021. On Eligard q6 months  5) PAMELLA: Nephrology following    Will follow. Patient seen and examined, chart reviewed.      Electronically signed by Feliciano Jurado PA-C on 9/12/2022 at 9:37 AM

## 2022-09-12 NOTE — PROGRESS NOTES
Infectious Disease Progress Note  2022   Patient Name: Kimberly Das : 1940     Assessment  Sepsis secondary to ESBL E. coli pyelonephritis associated with suprapubic cystostomy. Candida albicans Fungemia:   History of prostate cancer status post RPP. ESBL E coli bacteremia. S/p cystoscopy, left ureteral stent exchange and peritoneal mccrary catheter exchange on 2022. Fever has worsened, CRP and pct is elevated. raises question of persistent ESBL bacteremia, CRE, fungal UTI and pneumonia. Hematuria: post procedure  ?ileus  CKD stage IIIB  T2DM  Diabetic neuropathy     Plan  Therapeutic: -d/c meropenem 1 g q 12 hours . [facility] D[facility]w Dr. Petra Haywood and agree with repeating blood cx and CXR. Advised to start vancomycin and fluconazole  -DC fluconazole as BC positive for Candida albicans. Start IV Eraxis loading dose 200 mg and maintenance  100 mg q24h. Diagnostic: MRSA nares, Repeat BC , ordered  Recommend consult with ophthalmology   F/u: CT abdomen and pelvis wo contrast, blood cx, MRSA nares, urine cx  Other: Ordered Complete Echo to eval for IE with candidemia  Ordered removal of CVC with culture  DW plan with Dr. Brian Catalan    Reason for visit: F/u ESBL E coli bacteremia and left pyelonephritis? History:? Interval history noted  Denies n/v/d/f or untoward effects of antimicrobials  Physical Exam:  Vital Signs: BP (!) 135/57   Pulse 68   Temp 97.8 °F (36.6 °C) (Oral)   Resp 15   Ht 5' 8\" (1.727 m)   Wt 210 lb 15.4 oz (95.7 kg)   SpO2 98%   BMI 32.08 kg/m²     Gen: A&O x 4, no distress  HEMT: AT/NC Oropharynx pink, moist, and without lesions or exudates; dentition in good state of repair  Eyes: PERRLA, EOMI, conjunctiva pink, sclera anicteric. Neck: Supple. Trachea midline. No LAD. Chest: no distress and CTA. Good air movement. Heart: RRR and no MRG. Abd: suprapubic catheter, soft, gaseous distension, no tenderness, no hepatomegaly.  Normoactive bowel sounds. Ext: no clubbing, cyanosis, or edema  Catheter Site: without erythema or tenderness  LDA: CVC:  Neuro: Mental status intact. CN 2-12 intact and no focal sensory or motor deficits     Radiologic / Imaging / TESTING  9/10/22 XR Chest:  Impression   1. Right IJ line with the tip overlying the distal SVC/right atrial region. 2. Low lung volumes with bibasilar atelectasis. No significant change. 9/10/22 CT Abdomen Pelvis WO Contrast:  Impression   1. Probable hematuria left urinary collecting system (possible active   hemorrhage into the left urinary collecting system) and mild-to-moderate left   hydronephrosis. 2. The double-J stent appears in unremarkable position without left ureter   calculus evident. 3. Trace abdominopelvic ascites is probably reactive. 4.  Vascular calcifications are noted reflecting calcific atherosclerosis. 5. Trace left pleural effusion with left basilar relaxation atelectasis or   infiltrate. 6. Minimal subsegmental atelectasis posterior right lung base. 7. Small hiatal hernia.         Labs:    Recent Results (from the past 24 hour(s))   POCT Glucose    Collection Time: 09/11/22 11:53 AM   Result Value Ref Range    POC Glucose 256 (H) 70 - 99 MG/DL   Urinalysis    Collection Time: 09/11/22 12:34 PM   Result Value Ref Range    Color, UA BROWN (A) YELLOW    Clarity, UA TURBID (A) CLEAR    Glucose, Urine 250 (A) NEGATIVE MG/DL    Bilirubin Urine MODERATE (A) NEGATIVE MG/DL    Ketones, Urine TRACE (A) NEGATIVE MG/DL    Specific Gravity, UA 1.010 1.001 - 1.035    Blood, Urine LARGE (A) NEGATIVE    pH, Urine 6.5 5.0 - 8.0    Protein,  (A) NEGATIVE MG/DL    Urobilinogen, Urine 2.0 (H) 0.2 - 1.0 MG/DL    Nitrite Urine, Quantitative POSITIVE (A) NEGATIVE    Leukocyte Esterase, Urine LARGE (A) NEGATIVE    RBC, UA 3,866 (H) 0 - 3 /HPF    WBC,  (H) 0 - 2 /HPF    Bacteria, UA NEGATIVE NEGATIVE /HPF    WBC Clumps, UA MANY /HPF    Trichomonas, UA NONE SEEN NONE SEEN /HPF   Protein / creatinine ratio, urine    Collection Time: 09/11/22 12:34 PM   Result Value Ref Range    Urine Total Protein 58.7 (H) <12 MG/DL    Creatinine, Ur 18.9 (L) 39 - 259 MG/DL    Prot/Creat Ratio, Ur 3.1 (H) <0.2   Sodium, urine, random    Collection Time: 09/11/22 12:34 PM   Result Value Ref Range    Sodium, Ur 121 35 - 167 MMOL/L   POCT Glucose    Collection Time: 09/11/22  4:21 PM   Result Value Ref Range    POC Glucose 214 (H) 70 - 99 MG/DL   POCT Glucose    Collection Time: 09/11/22  8:39 PM   Result Value Ref Range    POC Glucose 237 (H) 70 - 99 MG/DL   POCT Glucose    Collection Time: 09/12/22  2:00 AM   Result Value Ref Range    POC Glucose 251 (H) 70 - 99 MG/DL   Vancomycin Level, Random    Collection Time: 09/12/22  5:45 AM   Result Value Ref Range    Vancomycin Rm 14.8 UG/ML    DOSE AMOUNT DOSE AMT.  GIVEN - `     DOSE TIME DOSE TIME GIVEN - `    Comprehensive Metabolic Panel    Collection Time: 09/12/22  5:45 AM   Result Value Ref Range    Sodium 139 135 - 145 MMOL/L    Potassium 3.4 (L) 3.5 - 5.1 MMOL/L    Chloride 103 99 - 110 mMol/L    CO2 24 21 - 32 MMOL/L    BUN 38 (H) 6 - 23 MG/DL    Creatinine 3.6 (H) 0.9 - 1.3 MG/DL    Glucose 183 (H) 70 - 99 MG/DL    Calcium 7.8 (L) 8.3 - 10.6 MG/DL    Albumin 2.3 (L) 3.4 - 5.0 GM/DL    Total Protein 4.7 (L) 6.4 - 8.2 GM/DL    Total Bilirubin 0.2 0.0 - 1.0 MG/DL    ALT 7 (L) 10 - 40 U/L    AST 23 15 - 37 IU/L    Alkaline Phosphatase 95 40 - 128 IU/L    GFR Non- 16 (L) >60 mL/min/1.73m2    GFR  20 (L) >60 mL/min/1.73m2    Anion Gap 12 4 - 16   CBC with Auto Differential    Collection Time: 09/12/22  5:45 AM   Result Value Ref Range    WBC 7.5 4.0 - 10.5 K/CU MM    RBC 2.23 (L) 4.6 - 6.2 M/CU MM    Hemoglobin 6.6 (LL) 13.5 - 18.0 GM/DL    Hematocrit 20.7 (L) 42 - 52 %    MCV 92.8 78 - 100 FL    MCH 29.6 27 - 31 PG    MCHC 31.9 (L) 32.0 - 36.0 %    RDW 15.8 (H) 11.7 - 14.9 %    Platelets 646 147 - 641 K/CU MM    MPV 10.3 7.5 - 11.1 FL    Differential Type AUTOMATED DIFFERENTIAL     Segs Relative 64.5 36 - 66 %    Lymphocytes % 17.2 (L) 24 - 44 %    Monocytes % 9.2 (H) 0 - 4 %    Eosinophils % 7.9 (H) 0 - 3 %    Basophils % 0.7 0 - 1 %    Segs Absolute 4.8 K/CU MM    Lymphocytes Absolute 1.3 K/CU MM    Monocytes Absolute 0.7 K/CU MM    Eosinophils Absolute 0.6 K/CU MM    Basophils Absolute 0.1 K/CU MM    Nucleated RBC % 0.0 %    Total Nucleated RBC 0.0 K/CU MM    Total Immature Neutrophil 0.04 K/CU MM    Immature Neutrophil % 0.5 (H) 0 - 0.43 %   POCT Glucose    Collection Time: 09/12/22  8:12 AM   Result Value Ref Range    POC Glucose 195 (H) 70 - 99 MG/DL     CULTURE results: Invalid input(s): BLOOD CULTURE,  URINE CULTURE, SURGICAL CULTURE    Diagnosis:  Patient Active Problem List   Diagnosis    Gait disturbance    Generalized weakness    Diabetes mellitus (HCC)    Osteoarthritis    Anemia of chronic disorder    Infected implanted bladder sphincter cuff    Escherichia coli urinary tract infection    Hypertension    Sepsis ([address]    Acute encephalopathy    Type 2 diabetes mellitus with hypoglycemia without coma (HCC)    UTI (urinary tract infection) due to urinary indwelling catheter (HCC)    Hyperkalemia    Acute kidney failure (HCC)    Leg weakness, bilateral    Type 2 diabetes mellitus with neurological manifestations, uncontrolled (HCC)    Complicated UTI (urinary tract infection)    Essential hypertension    Severe muscle deconditioning    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 (HCC)    Frequent falls    Chronic kidney disease, stage 3a (HCC)    Uncontrolled type 2 diabetes mellitus with peripheral neuropathy (HCC)    Prostate cancer (HCC)    Suprapubic catheter ([address]    Sepsis due to urinary tract infection (HCC)    Coagulase negative Staphylococcus bacteremia    Chronic kidney disease, stage IV (severe) (HCC)    Acute metabolic encephalopathy    SVT (supraventricular tachycardia) (HCC)    Metabolic encephalopathy History of prostate cancer    Cigarette nicotine dependence without complication    Altered mental status    Serratia marcescens infection    Hypoglycemia       Active Problems  Principal Problem:    Complicated UTI (urinary tract infection)  Resolved Problems:    * No resolved hospital problems. *              Electronically signed by: Electronically signed by Loyd Grant.  VIVIANA Tate CNP on 9/12/2022 at 10:48 AM

## 2022-09-12 NOTE — PROGRESS NOTES
V2.0  [facility] Hospitalist Progress Note      Name:  Yari Granados /Age/Sex: 1940  (78 y.o. male)   MRN & CSN:  [id number] & [id number] Encounter Date/Time: 2022 1:46 PM EDT    Location:  -A PCP: Maite Murray MD       Hospital Day: 16    Assessment and Plan:   Yari Granados is a 78 y.o. male with pmh of prostate cancer, diabetes mellitus, hypertension who admitted with sciatic found out with Complicated UTI (urinary tract infection)    Interval events: Overnight patient remained stable, but his hemoglobin decreased to 6.6 this morning, transfused 1 unit of packed red blood cells, blood cultures from  showed 2 out of 4 bottles positive for Candida albicans and urine culture also positive for Candida albicans, discussed with ID NP Fuad Later for recommendations of positive blood cultures, she stated she will assess the patient and give recommendations. She discontinued fluconazole, started on IV eraxis, repeat blood cultures , MRSA nares and recommended to consult with ophthalmology to rule out endophthalmitis, to obtain echocardiogram to rule out IE with candidemia also ordered removal of CVC with culture tip,    Try to consult ophthalmology but could not reach ophthalmology will try again. Called Dr Elsa Rubio recommended to check patient's [place] first, and if any compromise to call him for consult,talked to ISHMAEL Patino to do the visual acuity, visual fields, and chart in the notes. Also updated about the patients condition to patients son at bedside.     # Candida albicans fungemia  - ID following  - Started onEraxis on   - Obtain echocardiogram  - Remove central line with culture of the tip  - Obtain MRSA nares  - Recommended to consult ophthalmology    # ESBL bacteremia: Secondary to UTI  # Complicated UTI  - Urine culture on  growing E. coli resistant to Cipro and cefepime  - Urine culture on  no significant growth  - CT abdomen/pelvis showing left ureteral stent in place, mild bilateral urinary tract dilatation with new right perinephric and periureteral stranding raises concern for UTI    # Chronic urinary retention managed with suprapubic catheter and exchanged monthly, last exchange 9/7 with a cystoscopy patient had hematuria and patient became lethargic and drowsy with elevated leukocytes discussed with ID started on vancomycin and fluconazole and 9/9 obtaining blood cultures and urine cultures urine and blood cultures came back positive for Candida albicans treated as above  # Hematuria s/p cystoscopy and exchange of stent: Still urine in the bag looks bloody  - Urology following recommended to manually irrigate bladder every 4 hours and as needed clots    # Patient had right upper extremity swelling, obtained ultrasound right upper extremity negative for DVT but patient have swelling of the right, left, lower extremities suspected secondary to PAMELLA and fluid overload  - We will continue to monitor.  - Nephrology following    # Normocytic anemia   - Continue with H&H  - Transfuse if hemoglobin less than 7    # History of prostate cancer status post prostatectomy 1996  # Hypertension: Blood pressure stable continue on current medication   # Hypothyroidism continue levothyroxine   # Type 2 diabetes: Continue SSI, hypoglycemic protocol diabetic diet      Diet ADULT DIET; Regular; 4 carb choices (60 gm/meal)  ADULT ORAL NUTRITION SUPPLEMENT; Breakfast, Dinner;  Low Calorie/High Protein Oral Supplement   DVT Prophylaxis [x] Lovenox, []  Heparin, [] SCDs, [] Ambulation,  [] Eliquis, [] Xarelto  [] Coumadin   Code Status Full Code   Disposition From: Home  Expected Disposition: Home versus SNF  Estimated Date of Discharge: TBD  Patient requires continued admission due to IV antibiotics and hematuria   Surrogate Decision Maker/ POA Son     Subjective:     Chief Complaint: Fatigue (General weakness started this am. Family called because pt had changed from baseline. Currently being tx for UTI. Pt is slightly confused, doesn't know the year. He has been N/V. )     Patient seen and examined at bedside, patient states he feels fine today he looks more alert and oriented, decreased swelling of face upper and lower extremities with increased urine output but urine in the bag looks bloody, denies any chest pain, shortness of breath,      Review of Systems:    Review of Systems    Constitutional: No fever, no chills weakness and fatigue better from yesterday  HEENT: No headache no dizziness  Cardiovascular: No chest pain no palpitations  Respiratory: No cough no shortness of breath  Abdomen: No diarrhea, no nausea, no vomiting, no abdominal pain  Musculoskeletal: swelling improving  Neuro: No numbness or tingling  Skin: No rash      Objective: Intake/Output Summary (Last 24 hours) at 9/12/2022 1303  Last data filed at 9/12/2022 1237  Gross per 24 hour   Intake 610 ml   Output 1300 ml   Net -690 ml          Vitals:   Vitals:    09/12/22 1237   BP: (!) 141/57   Pulse: 69   Resp: 13   Temp: 97.8 °F (36.6 °C)   SpO2: 99%       Physical Exam:     General: afebrile, looks  ill and edematous, no distress  Eyes: EOMI  ENT: neck supple  Cardiovascular: S1-S2 normal no murmur  Respiratory: Air entry good bilaterally no wheezing no crackles  Gastrointestinal: Soft, non tender  Genitourinary: no suprapubic tenderness but urinary bag with bloody urine  Musculoskeletal: Swollen right, left and lower upper extremity looks improved from yesterday  Skin: warm, dry  Neuro: Alert. Psych: Mood appropriate.      Medications:   Medications:    insulin glargine  15 Units SubCUTAneous Nightly    insulin lispro  0-8 Units SubCUTAneous 2 times per day    anidulafungin  200 mg IntraVENous Once    And    [START ON 9/13/2022] anidulafungin  100 mg IntraVENous Q24H    insulin lispro  0-8 Units SubCUTAneous TID WC    Cefiderocol Sulfate Tosylate  1,000 mg IntraVENous Q8H    lidocaine PF  5 mL IntraDERmal Once    sodium chloride flush  5-40 mL IntraVENous 2 times per day    sodium chloride flush  5-40 mL IntraVENous 2 times per day    meperidine  12.5 mg IntraVENous Once    ferrous sulfate  325 mg Oral BID WC    carvedilol  6.25 mg Oral BID WC    lidocaine  1 patch TransDERmal Daily    amLODIPine  10 mg Oral Daily    aspirin  81 mg Oral Daily    atorvastatin  80 mg Oral Nightly    levothyroxine  75 mcg Oral Daily    melatonin  10 mg Oral Nightly    pantoprazole  40 mg Oral QAM AC    sodium chloride flush  5-40 mL IntraVENous 2 times per day    [Held by provider] enoxaparin  40 mg SubCUTAneous QPM      Infusions:    sodium chloride      sodium chloride      sodium chloride      sodium chloride      dextrose      sodium chloride 25 mL (09/06/22 1915)    dextrose       PRN Meds: sodium chloride, , PRN  bisacodyl, 10 mg, Daily PRN  docusate sodium, 100 mg, BID PRN  polyethylene glycol, 17 g, Daily PRN  sodium chloride, , PRN  sodium chloride flush, 5-40 mL, PRN  sodium chloride, , PRN  sodium chloride flush, 5-40 mL, PRN  sodium chloride, 25 mL, PRN  HYDROmorphone, 0.25 mg, Q5 Min PRN  fentanNYL, 50 mcg, Q5 Min PRN  glucose, 4 tablet, PRN  dextrose bolus, 125 mL, PRN   Or  dextrose bolus, 250 mL, PRN  glucagon (rDNA), 1 mg, PRN  dextrose, , Continuous PRN  guaiFENesin-dextromethorphan, 5 mL, Q4H PRN  magnesium hydroxide, 30 mL, Daily PRN  sodium chloride flush, 5-40 mL, PRN  sodium chloride, , PRN  ondansetron, 4 mg, Q8H PRN   Or  ondansetron, 4 mg, Q6H PRN  aluminum & magnesium hydroxide-simethicone, 30 mL, Q6H PRN  acetaminophen, 650 mg, Q6H PRN   Or  acetaminophen, 650 mg, Q6H PRN  glucose, 4 tablet, PRN  dextrose bolus, 125 mL, PRN   Or  dextrose bolus, 250 mL, PRN  glucagon (rDNA), 1 mg, PRN  dextrose, , Continuous PRN  ipratropium-albuterol, 1 vial, BID PRN      Labs      Recent Results (from the past 24 hour(s))   POCT Glucose    Collection Time: 09/11/22  4:21 PM   Result Value Ref Range    POC Glucose 214 (H) 70 - 99 MG/DL   POCT Glucose    Collection Time: 09/11/22  8:39 PM   Result Value Ref Range    POC Glucose 237 (H) 70 - 99 MG/DL   POCT Glucose    Collection Time: 09/12/22  2:00 AM   Result Value Ref Range    POC Glucose 251 (H) 70 - 99 MG/DL   Vancomycin Level, Random    Collection Time: 09/12/22  5:45 AM   Result Value Ref Range    Vancomycin Rm 14.8 UG/ML    DOSE AMOUNT DOSE AMT.  GIVEN - `     DOSE TIME DOSE TIME GIVEN - `    Comprehensive Metabolic Panel    Collection Time: 09/12/22  5:45 AM   Result Value Ref Range    Sodium 139 135 - 145 MMOL/L    Potassium 3.4 (L) 3.5 - 5.1 MMOL/L    Chloride 103 99 - 110 mMol/L    CO2 24 21 - 32 MMOL/L    BUN 38 (H) 6 - 23 MG/DL    Creatinine 3.6 (H) 0.9 - 1.3 MG/DL    Glucose 183 (H) 70 - 99 MG/DL    Calcium 7.8 (L) 8.3 - 10.6 MG/DL    Albumin 2.3 (L) 3.4 - 5.0 GM/DL    Total Protein 4.7 (L) 6.4 - 8.2 GM/DL    Total Bilirubin 0.2 0.0 - 1.0 MG/DL    ALT 7 (L) 10 - 40 U/L    AST 23 15 - 37 IU/L    Alkaline Phosphatase 95 40 - 128 IU/L    GFR Non- 16 (L) >60 mL/min/1.73m2    GFR  20 (L) >60 mL/min/1.73m2    Anion Gap 12 4 - 16   CBC with Auto Differential    Collection Time: 09/12/22  5:45 AM   Result Value Ref Range    WBC 7.5 4.0 - 10.5 K/CU MM    RBC 2.23 (L) 4.6 - 6.2 M/CU MM    Hemoglobin 6.6 (LL) 13.5 - 18.0 GM/DL    Hematocrit 20.7 (L) 42 - 52 %    MCV 92.8 78 - 100 FL    MCH 29.6 27 - 31 PG    MCHC 31.9 (L) 32.0 - 36.0 %    RDW 15.8 (H) 11.7 - 14.9 %    Platelets 757 672 - 277 K/CU MM    MPV 10.3 7.5 - 11.1 FL    Differential Type AUTOMATED DIFFERENTIAL     Segs Relative 64.5 36 - 66 %    Lymphocytes % 17.2 (L) 24 - 44 %    Monocytes % 9.2 (H) 0 - 4 %    Eosinophils % 7.9 (H) 0 - 3 %    Basophils % 0.7 0 - 1 %    Segs Absolute 4.8 K/CU MM    Lymphocytes Absolute 1.3 K/CU MM    Monocytes Absolute 0.7 K/CU MM    Eosinophils Absolute 0.6 K/CU MM    Basophils Absolute 0.1 K/CU MM    Nucleated RBC % 0.0 %    Total Nucleated RBC 0.0 K/CU MM    Total Immature Neutrophil 0.04 K/CU MM    Immature Neutrophil % 0.5 (H) 0 - 0.43 %   POCT Glucose    Collection Time: 09/12/22  8:12 AM   Result Value Ref Range    POC Glucose 195 (H) 70 - 99 MG/DL   POCT Glucose    Collection Time: 09/12/22 11:48 AM   Result Value Ref Range    POC Glucose 240 (H) 70 - 99 MG/DL        Imaging/Diagnostics Last 24 Hours   No results found.     Electronically signed by Per Guadalupe MD on 9/12/2022 at 1:03 PM

## 2022-09-13 PROBLEM — B49 FUNGEMIA: Status: ACTIVE | Noted: 2022-09-13

## 2022-09-13 LAB
ABO/RH: NORMAL
ANION GAP SERPL CALCULATED.3IONS-SCNC: 12 MMOL/L (ref 4–16)
ANTIBODY SCREEN: NEGATIVE
BASOPHILS ABSOLUTE: 0.1 K/CU MM
BASOPHILS RELATIVE PERCENT: 0.7 % (ref 0–1)
BUN BLDV-MCNC: 37 MG/DL (ref 6–23)
CALCIUM SERPL-MCNC: 7.8 MG/DL (ref 8.3–10.6)
CHLORIDE BLD-SCNC: 105 MMOL/L (ref 99–110)
CO2: 24 MMOL/L (ref 21–32)
COMPONENT: NORMAL
COMPONENT: NORMAL
CREAT SERPL-MCNC: 3.1 MG/DL (ref 0.9–1.3)
CROSSMATCH RESULT: NORMAL
CROSSMATCH RESULT: NORMAL
CULTURE: ABNORMAL
DIFFERENTIAL TYPE: ABNORMAL
EOSINOPHILS ABSOLUTE: 0.5 K/CU MM
EOSINOPHILS RELATIVE PERCENT: 6.4 % (ref 0–3)
GFR AFRICAN AMERICAN: 23 ML/MIN/1.73M2
GFR NON-AFRICAN AMERICAN: 19 ML/MIN/1.73M2
GLUCOSE BLD-MCNC: 123 MG/DL (ref 70–99)
GLUCOSE BLD-MCNC: 145 MG/DL (ref 70–99)
GLUCOSE BLD-MCNC: 166 MG/DL (ref 70–99)
GLUCOSE BLD-MCNC: 184 MG/DL (ref 70–99)
GLUCOSE BLD-MCNC: 190 MG/DL (ref 70–99)
GLUCOSE BLD-MCNC: 224 MG/DL (ref 70–99)
HCT VFR BLD CALC: 28 % (ref 42–52)
HEMOGLOBIN: 9 GM/DL (ref 13.5–18)
IMMATURE NEUTROPHIL %: 0.5 % (ref 0–0.43)
LYMPHOCYTES ABSOLUTE: 1.5 K/CU MM
LYMPHOCYTES RELATIVE PERCENT: 18.9 % (ref 24–44)
Lab: ABNORMAL
Lab: ABNORMAL
MAGNESIUM: 2.4 MG/DL (ref 1.8–2.4)
MCH RBC QN AUTO: 30.1 PG (ref 27–31)
MCHC RBC AUTO-ENTMCNC: 32.1 % (ref 32–36)
MCV RBC AUTO: 93.6 FL (ref 78–100)
MONOCYTES ABSOLUTE: 0.6 K/CU MM
MONOCYTES RELATIVE PERCENT: 7.8 % (ref 0–4)
NUCLEATED RBC %: 0 %
PDW BLD-RTO: 15.3 % (ref 11.7–14.9)
PHOSPHORUS: 3.1 MG/DL (ref 2.5–4.9)
PLATELET # BLD: 311 K/CU MM (ref 140–440)
PMV BLD AUTO: 9.9 FL (ref 7.5–11.1)
POTASSIUM SERPL-SCNC: 4 MMOL/L (ref 3.5–5.1)
RBC # BLD: 2.99 M/CU MM (ref 4.6–6.2)
SEGMENTED NEUTROPHILS ABSOLUTE COUNT: 5.1 K/CU MM
SEGMENTED NEUTROPHILS RELATIVE PERCENT: 65.7 % (ref 36–66)
SODIUM BLD-SCNC: 141 MMOL/L (ref 135–145)
SPECIMEN: ABNORMAL
SPECIMEN: ABNORMAL
STATUS: NORMAL
STATUS: NORMAL
TOTAL IMMATURE NEUTOROPHIL: 0.04 K/CU MM
TOTAL NUCLEATED RBC: 0 K/CU MM
TRANSFUSION STATUS: NORMAL
TRANSFUSION STATUS: NORMAL
UNIT DIVISION: 0
UNIT DIVISION: 0
UNIT NUMBER: NORMAL
UNIT NUMBER: NORMAL
WBC # BLD: 7.7 K/CU MM (ref 4–10.5)

## 2022-09-13 PROCEDURE — 6370000000 HC RX 637 (ALT 250 FOR IP): Performed by: SPECIALIST

## 2022-09-13 PROCEDURE — 97535 SELF CARE MNGMENT TRAINING: CPT

## 2022-09-13 PROCEDURE — 2580000003 HC RX 258: Performed by: STUDENT IN AN ORGANIZED HEALTH CARE EDUCATION/TRAINING PROGRAM

## 2022-09-13 PROCEDURE — 99232 SBSQ HOSP IP/OBS MODERATE 35: CPT | Performed by: NURSE PRACTITIONER

## 2022-09-13 PROCEDURE — 84100 ASSAY OF PHOSPHORUS: CPT

## 2022-09-13 PROCEDURE — 6370000000 HC RX 637 (ALT 250 FOR IP): Performed by: INTERNAL MEDICINE

## 2022-09-13 PROCEDURE — 6370000000 HC RX 637 (ALT 250 FOR IP): Performed by: STUDENT IN AN ORGANIZED HEALTH CARE EDUCATION/TRAINING PROGRAM

## 2022-09-13 PROCEDURE — 2060000000 HC ICU INTERMEDIATE R&B

## 2022-09-13 PROCEDURE — 80048 BASIC METABOLIC PNL TOTAL CA: CPT

## 2022-09-13 PROCEDURE — 2580000003 HC RX 258: Performed by: NURSE PRACTITIONER

## 2022-09-13 PROCEDURE — 85025 COMPLETE CBC W/AUTO DIFF WBC: CPT

## 2022-09-13 PROCEDURE — 80053 COMPREHEN METABOLIC PANEL: CPT

## 2022-09-13 PROCEDURE — 82962 GLUCOSE BLOOD TEST: CPT

## 2022-09-13 PROCEDURE — 6360000002 HC RX W HCPCS: Performed by: NURSE PRACTITIONER

## 2022-09-13 PROCEDURE — 83735 ASSAY OF MAGNESIUM: CPT

## 2022-09-13 PROCEDURE — 1200000000 HC SEMI PRIVATE

## 2022-09-13 PROCEDURE — 36415 COLL VENOUS BLD VENIPUNCTURE: CPT

## 2022-09-13 PROCEDURE — 97530 THERAPEUTIC ACTIVITIES: CPT

## 2022-09-13 PROCEDURE — 87040 BLOOD CULTURE FOR BACTERIA: CPT

## 2022-09-13 PROCEDURE — 6360000002 HC RX W HCPCS: Performed by: INTERNAL MEDICINE

## 2022-09-13 PROCEDURE — 99223 1ST HOSP IP/OBS HIGH 75: CPT | Performed by: INTERNAL MEDICINE

## 2022-09-13 PROCEDURE — 2580000003 HC RX 258: Performed by: INTERNAL MEDICINE

## 2022-09-13 RX ADMIN — FERROUS SULFATE TAB 325 MG (65 MG ELEMENTAL FE) 325 MG: 325 (65 FE) TAB at 09:32

## 2022-09-13 RX ADMIN — LEVOTHYROXINE SODIUM 75 MCG: 75 TABLET ORAL at 06:34

## 2022-09-13 RX ADMIN — ASPIRIN 81 MG CHEWABLE TABLET 81 MG: 81 TABLET CHEWABLE at 09:32

## 2022-09-13 RX ADMIN — CARVEDILOL 6.25 MG: 6.25 TABLET, FILM COATED ORAL at 18:15

## 2022-09-13 RX ADMIN — CEFIDEROCOL SULFATE TOSYLATE 1000 MG: 1 INJECTION, POWDER, FOR SOLUTION INTRAVENOUS at 18:16

## 2022-09-13 RX ADMIN — SODIUM CHLORIDE, PRESERVATIVE FREE 10 ML: 5 INJECTION INTRAVENOUS at 20:32

## 2022-09-13 RX ADMIN — INSULIN LISPRO 2 UNITS: 100 INJECTION, SOLUTION INTRAVENOUS; SUBCUTANEOUS at 20:33

## 2022-09-13 RX ADMIN — Medication 10 MG: at 20:32

## 2022-09-13 RX ADMIN — SODIUM CHLORIDE: 9 INJECTION, SOLUTION INTRAVENOUS at 02:48

## 2022-09-13 RX ADMIN — INSULIN GLARGINE 15 UNITS: 100 INJECTION, SOLUTION SUBCUTANEOUS at 20:33

## 2022-09-13 RX ADMIN — PANTOPRAZOLE SODIUM 40 MG: 40 TABLET, DELAYED RELEASE ORAL at 06:34

## 2022-09-13 RX ADMIN — DEXTROSE MONOHYDRATE 100 MG: 50 INJECTION, SOLUTION INTRAVENOUS at 16:13

## 2022-09-13 RX ADMIN — CARVEDILOL 6.25 MG: 6.25 TABLET, FILM COATED ORAL at 09:32

## 2022-09-13 RX ADMIN — CEFIDEROCOL SULFATE TOSYLATE 1000 MG: 1 INJECTION, POWDER, FOR SOLUTION INTRAVENOUS at 02:49

## 2022-09-13 RX ADMIN — AMLODIPINE BESYLATE 10 MG: 10 TABLET ORAL at 09:32

## 2022-09-13 RX ADMIN — SODIUM CHLORIDE, PRESERVATIVE FREE 10 ML: 5 INJECTION INTRAVENOUS at 09:33

## 2022-09-13 RX ADMIN — FERROUS SULFATE TAB 325 MG (65 MG ELEMENTAL FE) 325 MG: 325 (65 FE) TAB at 18:15

## 2022-09-13 RX ADMIN — CEFIDEROCOL SULFATE TOSYLATE 1000 MG: 1 INJECTION, POWDER, FOR SOLUTION INTRAVENOUS at 12:15

## 2022-09-13 RX ADMIN — ATORVASTATIN CALCIUM 80 MG: 40 TABLET, FILM COATED ORAL at 20:32

## 2022-09-13 RX ADMIN — BISACODYL 10 MG: 10 SUPPOSITORY RECTAL at 06:34

## 2022-09-13 ASSESSMENT — PAIN SCALES - WONG BAKER
WONGBAKER_NUMERICALRESPONSE: 0

## 2022-09-13 ASSESSMENT — PAIN DESCRIPTION - LOCATION: LOCATION: HAND

## 2022-09-13 ASSESSMENT — PAIN SCALES - GENERAL
PAINLEVEL_OUTOF10: 0
PAINLEVEL_OUTOF10: 8

## 2022-09-13 NOTE — PROGRESS NOTES
with acute tubular necrosis,   interstitial nephritis ruled out. Query created by: Alissa Garcia on 9/13/2022 12:20 PM      Electronically signed by:   Ariadne Olmedo MD 9/13/2022 12:24 PM

## 2022-09-13 NOTE — PROGRESS NOTES
Nephrology Progress Note  9/13/2022 12:13 PM        Subjective:   Admit Date: 8/28/2022  PCP: Darian Guzman MD    Interval History: Patient seen in early morning, this is a late entry  Is feeling better    Diet: Eating better    ROS: No shortness of breath, PND or orthopnea  Urine output recorded only 825 cc for the last shift  No fever and acceptable blood pressure      Data:     Current meds:    insulin glargine  15 Units SubCUTAneous Nightly    insulin lispro  0-8 Units SubCUTAneous 2 times per day    anidulafungin  100 mg IntraVENous Q24H    insulin lispro  0-8 Units SubCUTAneous TID WC    Cefiderocol Sulfate Tosylate  1,000 mg IntraVENous Q8H    lidocaine PF  5 mL IntraDERmal Once    sodium chloride flush  5-40 mL IntraVENous 2 times per day    sodium chloride flush  5-40 mL IntraVENous 2 times per day    meperidine  12.5 mg IntraVENous Once    ferrous sulfate  325 mg Oral BID WC    carvedilol  6.25 mg Oral BID WC    lidocaine  1 patch TransDERmal Daily    amLODIPine  10 mg Oral Daily    aspirin  81 mg Oral Daily    atorvastatin  80 mg Oral Nightly    levothyroxine  75 mcg Oral Daily    melatonin  10 mg Oral Nightly    pantoprazole  40 mg Oral QAM AC    sodium chloride flush  5-40 mL IntraVENous 2 times per day    [Held by provider] enoxaparin  40 mg SubCUTAneous QPM      sodium chloride      sodium chloride      sodium chloride 25 mL/hr at 09/13/22 0248    sodium chloride      dextrose      sodium chloride 25 mL (09/06/22 2327)    dextrose           I/O last 3 completed shifts: In: 56 [Blood:370;  Other:120]  Out: 2125 [Urine:2125]    CBC:   Recent Labs     09/11/22  0640 09/12/22  0545 09/12/22  1400   WBC 9.3 7.5  --    HGB 7.1* 6.6* 8.3*    287  --           Recent Labs     09/11/22  0640 09/12/22  0545    139   K 3.6 3.4*    103   CO2 23 24   BUN 34* 38*   CREATININE 3.4* 3.6*   GLUCOSE 179* 183*       Lab Results   Component Value Date    CALCIUM 7.8 (L) 09/12/2022    PHOS 3.3 09/11/2022       Objective:     Vitals: BP (!) 133/59   Pulse 65   Temp 98.2 °F (36.8 °C) (Oral)   Resp 12   Ht 5' 8\" (1.727 m)   Wt 210 lb 15.4 oz (95.7 kg)   SpO2 100%   BMI 32.08 kg/m² ,    General appearance: Alert, awake and oriented  HEENT: At least 2+ conjunctival pallor  Neck: Seems supple, with head of the bed elevated about 60 degrees  Lungs: No crackles on anterior exam  Heart: Seems regular rate and rhythm  Abdomen: Soft, nontender to palpation, suprapubic catheter  Extremities: 1-2+ edema      Problem List :         Impression :     Acute kidney injury on top of CKD stage III-urine output dropped unless recorded inaccurately-labs are pending-had transfusion yesterday  Genitourinary infection causing sepsis, he does have chronic indwelling left ureteral stent that has been recently exchanged for recurrent left ureteral obstruction of unknown etiology  He does have underlying diabetes, prostate cancer-multifactoral anemia requiring transfusion    Recommendation/Plan  :      We will review the lab when available  Check proBNP level tomorrow morning  Watch volume status closely  Watch for iatrogenic nosocomial complication  Good glycemic control and follow clinically  Am hoping that his creatinine will plateau      Benigno Dunbar MD MD

## 2022-09-13 NOTE — PROGRESS NOTES
Physical Therapy  Name: Lakesha Casper MRN: [id number] :   1940   Date:  2022   Admission Date: 2022 Room:  -A   Restrictions/Precautions:        contact precautions, falls    Communication with other providers:  Geena Cullen RN states pt is ok to see for therapy. RN would like handoff on how functional the patient is with self feeding. RN notified that patient fatigues easily with assisted feed and recommend that remain a feed    Subjective:  Patient states:  Patient is agreeable for rehab. Pain:   Location, Type, Intensity (0/10 to 10/10): Denies    Objective:    Observation:  Patient kylee's poor grasping strength and coordination with self care/eating today. Treatment, including education/measures:    Transfers with line management of Suprapubic catheter, BP Cuff, Tele Monitor  Rolling: Max A x 1 to ea side. With use of hand rail, patient is able to sustain sideling with use of bed rail   Supine to sit : Max A x 1 with sequential sequencing with management of Ble off Eob and trunk rise  Seated balance: Initially patient need mod A for steadying, with cues for posture and hand placement for support on EOB, assist improves to CGA. Tolerates ~15' of light dynamic activities  ADL training: Min-Mod A for to guide BUE for self feeding. Cues for posture and motion with feeding. Patient kylee's moderate-max fatigue with self feeding with BUE. Handoff to Evaluating OT and RN on patient functional self care  Sit to supine :Max A x 1 for BLE assist onto EOB  Scooting :Seated scooting to [facility], Mod A. Supine scoot to [facility] Max A x 2 with patient using BLE for effort    Safety  Patient left safely in the bed, with call light/phone in reach with alarm applied. Gait belt and mask were used for transfers and gait. Assessment / Impression:   Patient has fair tolerance with today's activities. He needs assist with self feeding today and fatigued after 10-15' of seated light dynamic activity.   Patient's tolerance of treatment:  Fair   Adverse Reaction: fatigue  Significant change in status and impact:  none  Barriers to improvement:  medical status    Plan for Next Session:    Will cont to work towards pt's goals per patient tolerance  Time in:  1124  Time out:  1202  Timed treatment minutes:  38  Total treatment time:  45  Previously filed items:  Social/Functional History  Lives With: Son  Home Equipment: Rollator  Short Term Goals  Time Frame for Short term goals: 2 weeks  Short term goal 1: Pt will perform sit><supine maxA  Short term goal 2: Pt will perform static sitting x 5 minutes SBA with BUE support  Short term goal 3: Pt will transfer between surfaces maxA       Electronically signed by:     Mounika Malone PTA  9/13/2022, 1:14 PM

## 2022-09-13 NOTE — PROGRESS NOTES
Progress Note( Dr. Roula Dunlap)  9/13/2022  Subjective:   Admit Date: 8/28/2022  PCP: Lexy Tavarez MD    Admitted For :Fatigue and possible sepsis from UTI    Consulted For: Control of blood glucose as patient has having hypoglycemic episodes    Interval History: feeling Somewhat  tired  had  no more episode of low blood glucose yesterday  Patient had an episode of a fever with a temperature of 102 felt very dizzy and confused patient was worked up for sepsis and moved to stepdown care ICU on evening of 9/9/2022  Is doing somewhat better more alert and awake to himself    Pt had Following Urology procedure done early morng hour spf 9/8/2022      Cystoscopy, left ureteral stent exchange,  suprapubic tube exchange. Denies any chest pains,   Denies SOB . Denies nausea or vomiting. now eating better  No new bowel or bladder symptoms.        Intake/Output Summary (Last 24 hours) at 9/13/2022 0807  Last data filed at 9/12/2022 1901  Gross per 24 hour   Intake 490 ml   Output 825 ml   Net -335 ml         DATA    CBC:   Recent Labs     09/11/22  0640 09/12/22  0545 09/12/22  1400   WBC 9.3 7.5  --    HGB 7.1* 6.6* 8.3*    287  --       CMP:  Recent Labs     09/11/22  0640 09/12/22  0545    139   K 3.6 3.4*    103   CO2 23 24   BUN 34* 38*   CREATININE 3.4* 3.6*   CALCIUM 7.7* 7.8*   PROT 4.5* 4.7*   LABALBU 2.3* 2.3*   BILITOT 0.2 0.2   ALKPHOS 86 95   AST 23 23   ALT 6* 7*       Lipids: No results found for: CHOL, HDL, TRIG  Glucose:  Recent Labs     09/12/22  2045 09/13/22  0202 09/13/22  0610   POCGLU 222* 145* 123*       [id number]:  Lab Results   Component Value Date/Time    LABA1C 7.8 08/29/2022 01:30 AM     High Sensitivity TSH:   Lab Results   Component Value Date/Time    TSHHS 7.590 04/28/2022 06:59 AM     Free T3: No results found for: FT3  Free T4:  Lab Results   Component Value Date/Time    T4FREE 1.15 03/27/2022 08:22 AM       CT ABDOMEN PELVIS WO CONTRAST Additional Contrast? None Final Result   1. Probable hematuria left urinary collecting system (possible active   hemorrhage into the left urinary collecting system) and mild-to-moderate left   hydronephrosis. 2. The double-J stent appears in unremarkable position without left ureter   calculus evident. 3. Trace abdominopelvic ascites is probably reactive. 4.  Vascular calcifications are noted reflecting calcific atherosclerosis. 5. Trace left pleural effusion with left basilar relaxation atelectasis or   infiltrate. 6. Minimal subsegmental atelectasis posterior right lung base. 7. Small hiatal hernia. XR CHEST 1 VIEW   Final Result   1. Right IJ line with the tip overlying the distal SVC/right atrial region. 2. Low lung volumes with bibasilar atelectasis. No significant change. XR CHEST PORTABLE   Final Result   Satisfactory position of the PICC. No acute cardiopulmonary process         VL DUP UPPER EXTREMITY VENOUS RIGHT   Final Result   No evidence of DVT within the right upper extremity. XR CHEST PORTABLE   Final Result   No acute process. XR ABDOMEN (KUB) (SINGLE AP VIEW)   Final Result   Prominent loops of bowel may represent persistent ileus or partial   obstruction. FL LESS THAN 1 HOUR   Final Result      XR CHEST PORTABLE   Final Result   No abnormalities noted. US ABDOMEN LIMITED Specify organ? LIVER, GALLBLADDER   Final Result   Unremarkable right upper quadrant ultrasound. CT ABDOMEN PELVIS WO CONTRAST Additional Contrast? None   Final Result   1. Left ureteral stent in place. Mild bilateral urinary tract dilatation   with new right perinephric and periureteral stranding raises concern for   urinary tract infection. No urinary stones. 2.  Suprapubic catheter in the decompressed urinary bladder. 3.  Ill-defined stranding/fluid throughout the retroperitoneum may relate to   vascular congestion or reactive changes.   No intraperitoneal free air or abscess. XR CHEST PORTABLE   Final Result   Increased interstitial opacity. While much or all of this may be chronic,   please correlate with any clinical evidence of acute interstitial edema.          IR REMOVE TUNNELED CVAD WO SQ PORT/PUMP    (Results Pending)        Scheduled Medicines   Medications:    insulin glargine  15 Units SubCUTAneous Nightly    insulin lispro  0-8 Units SubCUTAneous 2 times per day    anidulafungin  100 mg IntraVENous Q24H    insulin lispro  0-8 Units SubCUTAneous TID WC    Cefiderocol Sulfate Tosylate  1,000 mg IntraVENous Q8H    lidocaine PF  5 mL IntraDERmal Once    sodium chloride flush  5-40 mL IntraVENous 2 times per day    sodium chloride flush  5-40 mL IntraVENous 2 times per day    meperidine  12.5 mg IntraVENous Once    ferrous sulfate  325 mg Oral BID WC    carvedilol  6.25 mg Oral BID WC    lidocaine  1 patch TransDERmal Daily    amLODIPine  10 mg Oral Daily    aspirin  81 mg Oral Daily    atorvastatin  80 mg Oral Nightly    levothyroxine  75 mcg Oral Daily    melatonin  10 mg Oral Nightly    pantoprazole  40 mg Oral QAM AC    sodium chloride flush  5-40 mL IntraVENous 2 times per day    [Held by provider] enoxaparin  40 mg SubCUTAneous QPM      Infusions:    sodium chloride      sodium chloride      sodium chloride 25 mL/hr at 09/13/22 0248    sodium chloride      dextrose      sodium chloride 25 mL (09/06/22 2327)    dextrose           Objective:   Vitals: BP (!) 138/55   Pulse 69   Temp 97.6 °F (36.4 °C) (Axillary)   Resp 16   Ht 5' 8\" (1.727 m)   Wt 210 lb 15.4 oz (95.7 kg)   SpO2 98%   BMI 32.08 kg/m²   General appearance: alert and cooperative with exam  Neck: no JVD or bruit  Thyroid : Normal lobes   Lungs: Has Vesicular Breath sounds   Heart:  regular rate and rhythm  Abdomen: soft, non-tender; bowel sounds normal; no masses,  no organomegaly has suprapubic catheter   Musculoskeletal: Normal  Extremities: extremities normal, , no edema  Neurologic: Awake, alert, oriented to name, place and time. Cranial nerves II-XII are grossly intact. Motor weakness. Sensory neuropathy. ,  and gait is abnormal.  Stable    Assessment:     Patient Active Problem List:     Gait disturbance     Generalized weakness     Diabetes mellitus (HCC)     Osteoarthritis     Anemia of chronic disorder     Infected implanted bladder sphincter cuff     Escherichia coli urinary tract infection     Hypertension     Sepsis ([address]     Acute encephalopathy     Type 2 diabetes mellitus with hypoglycemia without coma (HCC)     UTI (urinary tract infection) due to urinary indwelling catheter ([facility])     Hyperkalemia     Acute kidney failure (HCC)     Leg weakness, bilateral     Type 2 diabetes mellitus with neurological manifestations, uncontrolled ([address]     Complicated UTI (urinary tract infection)     Essential hypertension     Severe muscle deconditioning     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 (HCC)     Frequent falls     Chronic kidney disease, stage 3a ([address]     Uncontrolled type 2 diabetes mellitus with peripheral neuropathy (HCC)     Prostate cancer (HCC)     Suprapubic catheter ([address]     Sepsis due to urinary tract infection ([address]     Coagulase negative Staphylococcus bacteremia     Chronic kidney disease, stage IV (severe) (HCC)     Acute metabolic encephalopathy     SVT (supraventricular tachycardia) (HCC)     Metabolic encephalopathy     History of prostate cancer     Cigarette nicotine dependence without complication     Altered mental status     Serratia marcescens infection     Hypoglycemia         Plan:     Reviewed POC blood glucose . Labs and X ray results   Reviewed Current Medicines   On Correction bolus Humalog/ Basal Lantus ( On Hold )Insulin regime /   Monitor Blood glucose frequently   Modified  the dose of Insulin/ other medicines as neede  Management is working to show the patient to skilled nursing unit possibly [facility] home  Will follow     .      Arpita Callejas, MD, MD

## 2022-09-13 NOTE — PROGRESS NOTES
Infectious Disease Progress Note  2022   Patient Name: Yari Granados : 1940     Assessment  Sepsis secondary to ESBL E. coli pyelonephritis associated with suprapubic cystostomy. Candida albicans Fungemia Source Unclear,  Possible CLABSI and/ or Endocarditis:     History of prostate cancer status post RPP. ESBL E coli bacteremia. S/p cystoscopy, left ureteral stent exchange and peritoneal mccrary catheter exchange on 2022. Fever has worsened, CRP and pct is elevated. raises question of persistent ESBL bacteremia, CRE, fungal UTI and pneumonia. Hematuria: post procedure  ?ileus  CKD stage IIIB  T2DM  Diabetic neuropathy     Plan  Therapeutic: -d/c meropenem 1 g q 12 hours . Continue IV Fetroja 1 gm q8h x 14 days (end date 22)    Continue IV vancomycin per pharmacy dosing, will DC if MRSA screen is negative  -DC fluconazole as BC positive for Candida albicans. Continue IV Eraxis 100 mg q24h for 2 weeks from negative BC at 48h  Diagnostic: MRSA nares, await repeat [facility]   Recommend consult with ophthalmology to evaluate for possible endophthalmitis   ID recommends requesting cardiology to perform RAFFI to evaluate for endocarditis, will determine duration of ABX therapy  F/u: CVC culture tip from , MRSA screen (if negative, will DC vancomycin)  I SANYA Vieyra. Reason for visit: F/u ESBL E coli bacteremia and left pyelonephritis? History:? Interval history noted  Denies n/v/d/f or untoward effects of antimicrobials. Physical Exam:  Vital Signs: /64   Pulse 67   Temp 98 °F (36.7 °C) (Oral)   Resp 13   Ht 5' 8\" (1.727 m)   Wt 210 lb 15.4 oz (95.7 kg)   SpO2 96%   BMI 32.08 kg/m²     Gen: somnolent in bed. HEMT: AT/NC Oropharynx pink, moist, and without lesions or exudates; dentition in good state of repair  Eyes: PERRLA, EOMI, conjunctiva pink, sclera anicteric. Neck: Supple. Trachea midline. No LAD. Chest: no distress and CTA. Good air movement.   Heart: RRR and no MRG. Abd: suprapubic catheter, soft, gaseous distension, no tenderness, no hepatomegaly. Normoactive bowel sounds. Ext: no clubbing, cyanosis, or edema  Catheter Site: without erythema or tenderness  Neuro: Mental status intact. CN 2-12 intact and no focal sensory or motor deficits     Radiologic / Imaging / TESTING  9/10/22 XR Chest:  Impression   1. Right IJ line with the tip overlying the distal SVC/right atrial region. 2. Low lung volumes with bibasilar atelectasis. No significant change. 9/10/22 CT Abdomen Pelvis WO Contrast:  Impression   1. Probable hematuria left urinary collecting system (possible active   hemorrhage into the left urinary collecting system) and mild-to-moderate left   hydronephrosis. 2. The double-J stent appears in unremarkable position without left ureter   calculus evident. 3. Trace abdominopelvic ascites is probably reactive. 4.  Vascular calcifications are noted reflecting calcific atherosclerosis. 5. Trace left pleural effusion with left basilar relaxation atelectasis or   infiltrate. 6. Minimal subsegmental atelectasis posterior right lung base. 7. Small hiatal hernia. 9/12/22 Echo Complete:   Summary   Left ventricular systolic function is normal.   Ejection fraction is visually estimated at 60%. Grade II diastolic dysfunction. Cannot rule out endocarditis on the tricuspid valve. No evidence of any pericardial effusion.     Labs:    Recent Results (from the past 24 hour(s))   POCT Glucose    Collection Time: 09/12/22 11:48 AM   Result Value Ref Range    POC Glucose 240 (H) 70 - 99 MG/DL   Protime/INR & PTT    Collection Time: 09/12/22  1:24 PM   Result Value Ref Range    Protime 14.2 11.7 - 14.5 SECONDS    INR 1.10 INDEX    aPTT 37.6 (H) 25.1 - 37.1 SECONDS   Fibrinogen    Collection Time: 09/12/22  1:24 PM   Result Value Ref Range    Fibrinogen 682 (H) 196.9 - 442.1 MG/DL   Hemoglobin and Hematocrit    Collection Time: 09/12/22  2:00 PM Result Value Ref Range    Hemoglobin 8.3 (L) 13.5 - 18.0 GM/DL    Hematocrit 25.9 (L) 42 - 52 %   POCT Glucose    Collection Time: 09/12/22  4:23 PM   Result Value Ref Range    POC Glucose 195 (H) 70 - 99 MG/DL   Culture, IV Catheter    Collection Time: 09/12/22  8:06 PM    Specimen: CVP   Result Value Ref Range    Specimen CVP TIP     Special Requests NONE    POCT Glucose    Collection Time: 09/12/22  8:45 PM   Result Value Ref Range    POC Glucose 222 (H) 70 - 99 MG/DL   POCT Glucose    Collection Time: 09/13/22  2:02 AM   Result Value Ref Range    POC Glucose 145 (H) 70 - 99 MG/DL   POCT Glucose    Collection Time: 09/13/22  6:10 AM   Result Value Ref Range    POC Glucose 123 (H) 70 - 99 MG/DL     CULTURE results: Invalid input(s): BLOOD CULTURE,  URINE CULTURE, SURGICAL CULTURE    Diagnosis:  Patient Active Problem List   Diagnosis    Gait disturbance    Generalized weakness    Diabetes mellitus (HCC)    Osteoarthritis    Anemia of chronic disorder    Infected implanted bladder sphincter cuff    Escherichia coli urinary tract infection    Hypertension    Sepsis ([address]    Acute encephalopathy    Type 2 diabetes mellitus with hypoglycemia without coma (HCC)    UTI (urinary tract infection) due to urinary indwelling catheter ([facility])    Hyperkalemia    Acute kidney failure (HCC)    Leg weakness, bilateral    Type 2 diabetes mellitus with neurological manifestations, uncontrolled ([address]    Complicated UTI (urinary tract infection)    Essential hypertension    Severe muscle deconditioning    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 (HCC)    Frequent falls    Chronic kidney disease, stage 3a (HCC)    Uncontrolled type 2 diabetes mellitus with peripheral neuropathy (HCC)    Prostate cancer (HCC)    Suprapubic catheter ([address]    Sepsis due to urinary tract infection (HCC)    Coagulase negative Staphylococcus bacteremia    Chronic kidney disease, stage IV (severe) (HCC)    Acute metabolic encephalopathy    SVT (supraventricular tachycardia) (HCC)    Metabolic encephalopathy    History of prostate cancer    Cigarette nicotine dependence without complication    Altered mental status    Serratia marcescens infection    Hypoglycemia       Active Problems  Principal Problem:    Complicated UTI (urinary tract infection)  Resolved Problems:    * No resolved hospital problems. *              Electronically signed by: Electronically signed by Loyd Grant.  VIVIANA Tate CNP on 9/13/2022 at 10:23 AM

## 2022-09-13 NOTE — CONSULTS
CARDIOLOGY CONSULT NOTE   Reason for consultation: Thalia De Leon     Referring physician:  Reema Cordova MD     Primary care physician: Melissa Medina MD      Dear  Dr. Reema Cordova MD   Thanks for the consult. Chief Complaints :  Chief Complaint   Patient presents with    Fatigue     General weakness started this am. Family called because pt had changed from baseline. Currently being tx for UTI. Pt is slightly confused, doesn't know the year. He has been N/V. History of present illness:Greg is a 78 y. o.year old who presents with generalized weakness recurrent urinary tract infection suprapubic catheter cultures concerning for Candida albicans fungemia cardiology asked to evaluate him for possible endocarditis? Currently afebrile has a suprapubic catheter known to cardiology service during previous admission due to Klebsiella bacteremia and SVT runs currently in sinus rhythm      Past medical history:    has a past medical history of Acute urinary tract infection, Ataxia, Diabetes mellitus ([address] Fusion of spine of cervical region, Gait disturbance, History of prostate cancer, History of tobacco use, Hyperlipidemia, and Osteoarthritis. Past surgical history:   has a past surgical history that includes Prostate surgery; other surgical history (3/28/13); Colon surgery; Bladder surgery; Prostatectomy (1996); Bladder surgery (Left, 3/17/2022); and Cystoscopy (Left, 9/7/2022). Social History:   reports that he quit smoking about 46 years ago. His smoking use included cigarettes. He smoked an average of .5 packs per day. He has never used smokeless tobacco. He reports that he does not drink alcohol and does not use drugs.   Family history:   no family history of CAD, STROKE of DM at early age    No Known Allergies    insulin glargine (LANTUS) injection vial 15 Units, Nightly  insulin lispro (HUMALOG) injection vial 0-8 Units, 2 times per day  0.9 % sodium chloride infusion, PRN  anidulafungin (ERAXIS) 100 mg in dextrose 5 % 130 mL IVPB, Q24H  bisacodyl (DULCOLAX) suppository 10 mg, Daily PRN  docusate sodium (COLACE) capsule 100 mg, BID PRN  polyethylene glycol (GLYCOLAX) packet 17 g, Daily PRN  0.9 % sodium chloride infusion, PRN  insulin lispro (HUMALOG) injection vial 0-8 Units, TID   cefiderocol sulfate tosylate (FETROJA) 1,000 mg in sodium chloride 0.9 % 100 mL IVPB, Q8H  lidocaine PF 1 % injection 5 mL, Once  sodium chloride flush 0.9 % injection 5-40 mL, 2 times per day  sodium chloride flush 0.9 % injection 5-40 mL, PRN  0.9 % sodium chloride infusion, PRN  sodium chloride flush 0.9 % injection 5-40 mL, 2 times per day  sodium chloride flush 0.9 % injection 5-40 mL, PRN  0.9 % sodium chloride infusion, PRN  meperidine (DEMEROL) injection 12.5 mg, Once  HYDROmorphone (DILAUDID) injection 0.25 mg, Q5 Min PRN  fentaNYL (SUBLIMAZE) injection 50 mcg, Q5 Min PRN  glucose chewable tablet 16 g, PRN  dextrose bolus 10% 125 mL, PRN   Or  dextrose bolus 10% 250 mL, PRN  glucagon (rDNA) injection 1 mg, PRN  dextrose 10 % infusion, Continuous PRN  ferrous sulfate (IRON 325) tablet 325 mg, BID   carvedilol (COREG) tablet 6.25 mg, BID   guaiFENesin-dextromethorphan (ROBITUSSIN DM) 100-10 MG/5ML syrup 5 mL, Q4H PRN  lidocaine 4 % external patch 1 patch, Daily  amLODIPine (NORVASC) tablet 10 mg, Daily  aspirin chewable tablet 81 mg, Daily  atorvastatin (LIPITOR) tablet 80 mg, Nightly  levothyroxine (SYNTHROID) tablet 75 mcg, Daily  magnesium hydroxide (MILK OF MAGNESIA) 400 MG/5ML suspension 30 mL, Daily PRN  melatonin tablet 10 mg, Nightly  pantoprazole (PROTONIX) tablet 40 mg, QAM AC  sodium chloride flush 0.9 % injection 5-40 mL, 2 times per day  sodium chloride flush 0.9 % injection 5-40 mL, PRN  0.9 % sodium chloride infusion, PRN  [Held by provider] enoxaparin (LOVENOX) injection 40 mg, QPM  ondansetron (ZOFRAN-ODT) disintegrating tablet 4 mg, Q8H PRN   Or  ondansetron (ZOFRAN) injection 4 mg, Q6H PRN  aluminum & magnesium hydroxide-simethicone (MAALOX) 200-200-20 MG/5ML suspension 30 mL, Q6H PRN  acetaminophen (TYLENOL) tablet 650 mg, Q6H PRN   Or  acetaminophen (TYLENOL) suppository 650 mg, Q6H PRN  glucose chewable tablet 16 g, PRN  dextrose bolus 10% 125 mL, PRN   Or  dextrose bolus 10% 250 mL, PRN  glucagon (rDNA) injection 1 mg, PRN  dextrose 10 % infusion, Continuous PRN  ipratropium-albuterol (DUONEB) nebulizer solution 3 mL, BID PRN      Current Facility-Administered Medications   Medication Dose Route Frequency Provider Last Rate Last Admin    insulin glargine (LANTUS) injection vial 15 Units  15 Units SubCUTAneous Nightly Amy Artis MD   15 Units at 09/12/22 2209    insulin lispro (HUMALOG) injection vial 0-8 Units  0-8 Units SubCUTAneous 2 times per day Amy Artis MD   2 Units at 09/12/22 2219    0.9 % sodium chloride infusion   IntraVENous PRN VIVIANA Wallace CNP        anidulafungin (ERAXIS) 100 mg in dextrose 5 % 130 mL IVPB  100 mg IntraVENous Q24H VIVIANA Carpenter - CNP        bisacodyl (DULCOLAX) suppository 10 mg  10 mg Rectal Daily PRN Marcelino Machado MD   10 mg at 09/13/22 0634    docusate sodium (COLACE) capsule 100 mg  100 mg Oral BID PRN Marcelino Machado MD        polyethylene glycol (GLYCOLAX) packet 17 g  17 g Oral Daily PRN Marcelino Machado MD        0.9 % sodium chloride infusion   IntraVENous PRN VIVIANA Wallace CNP        insulin lispro (HUMALOG) injection vial 0-8 Units  0-8 Units SubCUTAneous TID WC Amy Artis MD   2 Units at 09/12/22 1158    cefiderocol sulfate tosylate (FETROJA) 1,000 mg in sodium chloride 0.9 % 100 mL IVPB  1,000 mg IntraVENous Mounika Mathur MD 33.3 mL/hr at 09/13/22 1215 1,000 mg at 09/13/22 1215    lidocaine PF 1 % injection 5 mL  5 mL IntraDERmal Once Marcelino Machado MD        sodium chloride flush 0.9 % injection 5-40 mL  5-40 mL IntraVENous 2 times per day Marcelino Machado MD   10 mL at 09/13/22 0933    sodium chloride flush 0.9 % injection 5-40 mL  5-40 mL IntraVENous PRN Gloria Saunders MD        0.9 % sodium chloride infusion   IntraVENous PRN Gloria Saunders MD 25 mL/hr at 09/13/22 0248 New Bag at 09/13/22 0248    sodium chloride flush 0.9 % injection 5-40 mL  5-40 mL IntraVENous 2 times per day Shorty Cox MD   10 mL at 09/12/22 0940    sodium chloride flush 0.9 % injection 5-40 mL  5-40 mL IntraVENous PRN Shorty Cox MD        0.9 % sodium chloride infusion  25 mL IntraVENous PRN Shorty Cox MD        meperidine (DEMEROL) injection 12.5 mg  12.5 mg IntraVENous Once Shorty Cox MD        HYDROmorphone (DILAUDID) injection 0.25 mg  0.25 mg IntraVENous Q5 Min PRN Shorty Cox MD        fentaNYL (SUBLIMAZE) injection 50 mcg  50 mcg IntraVENous Q5 Min PRN Shorty Cox MD        glucose chewable tablet 16 g  4 tablet Oral PRN Shorty Cox MD        dextrose bolus 10% 125 mL  125 mL IntraVENous PRN Shorty Cox MD        Or    dextrose bolus 10% 250 mL  250 mL IntraVENous PRN Shorty Cox MD        glucagon (rDNA) injection 1 mg  1 mg SubCUTAneous PRN Shorty Cox MD        dextrose 10 % infusion   IntraVENous Continuous PRN Shorty Cox MD        ferrous sulfate (IRON 325) tablet 325 mg  325 mg Oral BID  Rachel Morales MD   325 mg at 09/13/22 0932    carvedilol (COREG) tablet 6.25 mg  6.25 mg Oral BID  Rachel Morales MD   6.25 mg at 09/13/22 0932    guaiFENesin-dextromethorphan (ROBITUSSIN DM) 100-10 MG/5ML syrup 5 mL  5 mL Oral Q4H PRN Rachel Morales MD        lidocaine 4 % external patch 1 patch  1 patch TransDERmal Daily Rachel Morales MD   1 patch at 09/13/22 0932    amLODIPine (NORVASC) tablet 10 mg  10 mg Oral Daily Roby Rai MD   10 mg at 09/13/22 0932    aspirin chewable tablet 81 mg  81 mg Oral Daily Rachel Morales MD   81 mg at 09/13/22 0932    atorvastatin (LIPITOR) tablet 80 mg  80 mg Oral Nightly Elizabeth Baer MD   80 mg at 09/12/22 2209    levothyroxine (SYNTHROID) tablet 75 mcg  75 mcg Oral Daily Elizabeth Baer MD   75 mcg at 09/13/22 0634    magnesium hydroxide (MILK OF MAGNESIA) 400 MG/5ML suspension 30 mL  30 mL Oral Daily PRN Elizabeth Baer MD        melatonin tablet 10 mg  10 mg Oral Nightly Elizabeth Baer MD   10 mg at 09/12/22 2209    pantoprazole (PROTONIX) tablet 40 mg  40 mg Oral QAM AC Elizabeth Baer MD   40 mg at 09/13/22 0634    sodium chloride flush 0.9 % injection 5-40 mL  5-40 mL IntraVENous 2 times per day Elizabeth Baer MD   10 mL at 09/12/22 2225    sodium chloride flush 0.9 % injection 5-40 mL  5-40 mL IntraVENous PRN Elizabeth Baer MD        0.9 % sodium chloride infusion   IntraVENous PRN Elizabeth Baer  mL/hr at 09/06/22 2327 25 mL at 09/06/22 2327    [Held by provider] enoxaparin (LOVENOX) injection 40 mg  40 mg SubCUTAneous QPM Elizabeth Baer MD   40 mg at 09/09/22 1759    ondansetron (ZOFRAN-ODT) disintegrating tablet 4 mg  4 mg Oral Q8H PRN Elizabeth Baer MD   4 mg at 08/30/22 0352    Or    ondansetron (ZOFRAN) injection 4 mg  4 mg IntraVENous Q6H PRN Elizabeth Baer MD   4 mg at 09/08/22 1827    aluminum & magnesium hydroxide-simethicone (MAALOX) 200-200-20 MG/5ML suspension 30 mL  30 mL Oral Q6H PRN Elizabeth Baer MD        acetaminophen (TYLENOL) tablet 650 mg  650 mg Oral Q6H PRN Elizabeth Baer MD   650 mg at 09/09/22 1946    Or    acetaminophen (TYLENOL) suppository 650 mg  650 mg Rectal Q6H PRN Elizabeth Baer MD        glucose chewable tablet 16 g  4 tablet Oral PRN Elizabeth Baer MD        dextrose bolus 10% 125 mL  125 mL IntraVENous PRN Elizabeth Baer MD   Stopped at 09/10/22 0933    Or    dextrose bolus 10% 250 mL  250 mL IntraVENous PRN Elizabeth Baer MD        glucagon (rDNA) injection 1 mg  1 mg SubCUTAneous NISSA Baer MD 1 mg at 09/01/22 0812    dextrose 10 % infusion   IntraVENous Continuous PRN Caffie Barthel, MD        ipratropium-albuterol (DUONEB) nebulizer solution 3 mL  1 vial Inhalation BID PRN Caffie Barthel, MD         Review of Systems:   Constitutional: No Fever or Weight Loss   Eyes: No Decreased Vision  ENT: No Headaches, Hearing Loss or Vertigo  Cardiovascular: As per HPI  Respiratory: As per HPI  Gastrointestinal: No abdominal pain, appetite loss, blood in stools, constipation, diarrhea or heartburn  Genitourinary: No dysuria, trouble voiding, or hematuria  Musculoskeletal:  No gait disturbance, weakness or joint complaints  Integumentary: No rash or pruritis  Neurological: No TIA or stroke symptoms  Psychiatric: No anxiety or depression  Endocrine: No malaise, fatigue or temperature intolerance  Hematologic/Lymphatic: No bleeding problems, blood clots or swollen lymph nodes  Allergic/Immunologic: No nasal congestion or hives  All systems negative except as marked. Physical Examination:    Vitals:    09/13/22 0447 09/13/22 0503 09/13/22 0928 09/13/22 1111   BP: (!) 138/55  132/64 (!) 133/59   Pulse: 66 69 67 65   Resp: 16  13 12   Temp: 97.6 °F (36.4 °C)  98 °F (36.7 °C) 98.2 °F (36.8 °C)   TempSrc: Axillary  Oral Oral   SpO2: 98%  96% 100%   Weight: 210 lb 15.4 oz (95.7 kg)      Height: 5' 8\" (1.727 m)          General Appearance:  No distress, conversant    Constitutional:  Well developed, Well nourished, No acute distress, Non-toxic appearance. HENT:  Normocephalic, Atraumatic, Bilateral external ears normal, Oropharynx moist, No oral exudates, Nose normal. Neck- Normal range of motion, No tenderness, Supple, No stridor,no apical-carotid delay  Lymphatics : no palpable lymph nodes  Eyes:  PERRL, EOMI, Conjunctiva normal, No discharge. Respiratory:  Normal breath sounds, No respiratory distress, No wheezing, No chest tenderness. ,no use of accessory muscles, crackles Absent   Cardiovascular: (PMI) apex non displaced,no lifts no thrills, ankle swelling Absent  , 1+, s1 and s2 audible,Murmur. Absent , JVD not noted    Abdomen /GI:  Bowel sounds normal, Soft, No tenderness, No masses, No gross visceromegaly   :  No costovertebral angle tenderness   Musculoskeletal:  No edema, no tenderness, no deformities. Back- no tenderness  Integument:  Well hydrated, no rash   Lymphatic:  No lymphadenopathy noted   Neurologic:  Alert & oriented x 3, CN 2-12 normal, normal motor function, normal sensory function, no focal deficits noted           Medical decision making and Data review:    Lab Review   Recent Labs     09/13/22  1248   WBC 7.7   HGB 9.0*   HCT 28.0*         Recent Labs     09/13/22  1248      K 4.0      CO2 24   PHOS 3.1   BUN 37*   CREATININE 3.1*     Recent Labs     09/12/22  0545   AST 23   ALT 7*   BILITOT 0.2   ALKPHOS 95     No results for input(s): TROPONINT in the last 72 hours. No results for input(s): PROBNP in the last 72 hours.   Lab Results   Component Value Date    INR 1.10 09/12/2022    PROTIME 14.2 09/12/2022       EKG: (reviewed by myself)    ECHO:(reviewed by myself)    Chest Xray:(reviewed by myself)  Echocardiogram complete 2D with doppler with color    Result Date: 9/12/2022  Transthoracic Echocardiography Report (TTE)  Demographics   Patient Name      Hellen MARCIAL     Date of Study       09/12/2022   Date of Birth     1940         Gender              Male   Age               78 year(s)         Race                Black   Patient Number    [id number]         Room Number         2006   Visit Number      [id number]   Corporate ID      [id number]   Accession Number  [id number]         Sonographer         JOVANI Natarajan   Ordering          [address], [place]  Physician         L APRN-CNP         Physician           MD  Procedure Type of Study   TTE procedure:ECHOCARDIOGRAM COMPLETE 2D W DOPPLER W COLOR. Procedure Date Date: 09/12/2022 Start: 02:23 PM Study Location: Portable Technical Quality: Adequate visualization Indications:Endocarditis. Patient Status: Routine Height: 68 inches Weight: 210 pounds BSA: 2.09 m2 BMI: 31.93 kg/m2 HR: 68 bpm BP: 141/57 mmHg  Conclusions   Summary  Left ventricular systolic function is normal.  Ejection fraction is visually estimated at 60%. Grade II diastolic dysfunction. Cannot rule out endocarditis on the tricuspid valve. No evidence of any pericardial effusion. Signature   ------------------------------------------------------------------  Electronically signed by Sakina Saez MD (Interpreting  physician) on 09/12/2022 at 06:43 PM  ------------------------------------------------------------------   Findings   Left Ventricle  Left ventricular systolic function is normal.  Ejection fraction is visually estimated at 60%. No regional wall motion abnormalites. Grade II diastolic dysfunction. Left ventricle size is normal.   Left Atrium  Essentially normal left atrium. Right Atrium  Essentially normal right atrium. Right Ventricle  Essentially normal right ventricle. Aortic Valve  Sclerotic, but non-stenotic aortic valve. No significant valvular disease noted. Mitral Valve  Structurally normal mitral valve. Mild mitral regurgitation. Tricuspid Valve  Tricuspid valve is structurally normal.  Mild tricuspid regurgitation. Cannot rule out endocarditis on the tricuspid valve. Pulmonic Valve  Pulmonic valve is structurally normal.  No significant valvular disease noted. Pericardial Effusion  No evidence of any pericardial effusion. Pleural Effusion  No evidence of pleural effusion. Miscellaneous  IVC and abdominal aorta are within normal limits.   M-Mode/2D Measurements & Calculations   LV Diastolic Dimension:  LV Systolic Dimension:  LA Dimension: 3.7 cmAO Root  3.68 cm                  2.44 cm Dimension: 3.3 cmLA Area:  LV FS:33.7 %             LV Volume Diastolic: 62 09.8 cm2  LV PW Diastolic: 1.3 cm  ml  LV PW Systolic: 5.54 cm  LV Volume Systolic: 26  Septum Diastolic: 8.05   ml  cm                       LV EDV/LV EDV Index: 62  Septum Systolic: 2.23 cm TD/60 P9HW ESV/LV ESV   LA/Aorta: 1.12  CO: 4.47 l/min           Index: 26 ml/12 m2      Ascending Aorta: 3.4 cm  CI: 2.14 l/m*m2          EF Calculated (A4C):    LA volume/Index: 27 ml                           58.1 %                  /89A6  LV Area Diastolic: 24    EF Calculated (2D):  cm2                      63.4 %  LV Area Systolic: 58.1  cm2                      LV Length: 7.77 cm                            LVOT: 2.1 cm  Doppler Measurements & Calculations   MV Peak E-Wave: 110     AV Peak Velocity: 150 cm/s  LVOT Peak Velocity: 88  cm/s                    AV Peak Gradient: 9 mmHg    cm/s  MV Peak A-Wave: 89.3    AV Mean Velocity: 107 cm/s  LVOT Mean Velocity: 65  cm/s                    AV Mean Gradient: 5 mmHg    cm/s  MV E/A Ratio: 1.23      AV VTI: 31.9 cm             LVOT Peak Gradient: 3  MV Peak Gradient: 4.84  AV Area (Continuity):2.06   mmHgLVOT Mean Gradient:  mmHg                    cm2                         2 mmHg  MV Mean Gradient: 4                                 Estimated RVSP: 35 mmHg  mmHg                    LVOT VTI: 19 cm             Estimated RAP:3 mmHg  MV Mean Velocity: 90.2  cm/s                    Estimated PASP: 35.49 mmHg  MV Deceleration Time:                               TR Velocity:285 cm/s  203 msec                                            TR Gradient:32.49 mmHg   MV Area (continuity):  1.67 cm2  MV E' Septal Velocity:  8.33 cm/s  MV A' Septal Velocity:  7.13 cm/s  MV E' Lateral Velocity:  8.11 cm/s  MV A' Lateral Velocity:  9.32 cm/s  MV E/E' septal: 13.21  MV E/E' lateral: 13.56      CT ABDOMEN PELVIS WO CONTRAST Additional Contrast? None    Result Date: 9/10/2022  EXAMINATION: CT OF THE ABDOMEN AND PELVIS WITHOUT CONTRAST 9/10/2022 4:35 pm TECHNIQUE: CT of the abdomen and pelvis was performed without the administration of intravenous contrast. Multiplanar reformatted images are provided for review. Automated exposure control, iterative reconstruction, and/or weight based adjustment of the mA/kV was utilized to reduce the radiation dose to as low as reasonably achievable. COMPARISON: CT abdomen and pelvis 09/01/2022 and 04/18/2022 HISTORY: ORDERING SYSTEM PROVIDED HISTORY: Recent stent placement/SPT exchange. Worsening fevers/leukocytosis. FINDINGS: Lower Chest: Trace left pleural effusion with left basilar relaxation atelectasis or infiltrate. Minimal subsegmental atelectasis posterior right lung base. Visualized portions of the cardiac and posterior mediastinal structures appear unremarkable with exception of a small hiatal hernia. Organs: Calcifications liver and spleen reflect sequela of old granulomatous disease (benign finding requiring no additional evaluation or follow-up). Normal attenuation throughout the liver. No discrete hepatic lesion or intrahepatic bile duct dilatation is seen. The gallbladder, RIGHT kidney, spleen, adrenal glands and pancreas appear unremarkable. Relative high density within the left urinary collecting system, mild to moderate severity hydronephrosis with double-J ureter stent in unremarkable position. No calculus evident along the course of the stent. Prominent left perinephric stranding GI/Bowel: Small hiatal hernia. The intra-abdominal stomach and small bowel appear unremarkable. No diffuse or focal small bowel wall thickening or inflammatory changes evident. No obstruction is seen. The appendix is visualized right lower quadrant, unremarkable in appearance. The colon appears unremarkable. Pelvis: Urinary bladder decompressed by suprapubic catheter. Distal portion of left double-J ureter stent appears unremarkable in position.   Surgical clips at the expected location of the prostate gland presumably status post prostatectomy. Trace simple appearing pelvic ascites posteriorly. No pneumoperitoneum. Vascular calcifications are noted reflecting calcific atherosclerosis. Peritoneum/Retroperitoneum: Perinephric fatty stranding on the left. Trace simple appearing left pericolic gutter ascites. No pneumoperitoneum. Calcific atherosclerosis aorta and branches without aneurysm. Inferior vena cava appears unremarkable. No adenopathy is seen. Bones/Soft Tissues: No acute superficial soft tissue or osseous structure abnormality evident. No suspicious lytic or blastic lesion evident. 1. Probable hematuria left urinary collecting system (possible active hemorrhage into the left urinary collecting system) and mild-to-moderate left hydronephrosis. 2. The double-J stent appears in unremarkable position without left ureter calculus evident. 3. Trace abdominopelvic ascites is probably reactive. 4.  Vascular calcifications are noted reflecting calcific atherosclerosis. 5. Trace left pleural effusion with left basilar relaxation atelectasis or infiltrate. 6. Minimal subsegmental atelectasis posterior right lung base. 7. Small hiatal hernia. CT ABDOMEN PELVIS WO CONTRAST Additional Contrast? None    Result Date: 9/1/2022  EXAMINATION: CT OF THE ABDOMEN AND PELVIS WITHOUT CONTRAST 9/1/2022 3:06 pm TECHNIQUE: CT of the abdomen and pelvis was performed without the administration of intravenous contrast. Multiplanar reformatted images are provided for review. Automated exposure control, iterative reconstruction, and/or weight based adjustment of the mA/kV was utilized to reduce the radiation dose to as low as reasonably achievable. COMPARISON: 04/18/2022.  HISTORY: ORDERING SYSTEM PROVIDED HISTORY: abdominal pain TECHNOLOGIST PROVIDED HISTORY: Reason for exam:->abdominal pain Additional Contrast?->None Reason for Exam: abdominal pain, POSSIBLE UTI FINDINGS: Lower Chest: The visualized lung bases demonstrate subsegmental atelectasis/scarring. Trace pericardial fluid. Questionable trace bilateral pleural fluid. Atherosclerosis in the visualized thoracic aorta. Coronary artery calcifications. Small hiatal hernia. Liver: Normal. Gallbladder and Bile Ducts: Normal. Spleen: Normal. Adrenal Glands: Normal. Pancreas: Normal. Genitourinary: Left ureteral stent with minimal dilatation of the left urinary tract. Mild dilatation of the right urinary tract. Mild dilatation of the right urinary tract with increased right perinephric and periureteral stranding. No definite urinary stones. Suprapubic catheter in the decompressed urinary bladder. Bowel: Normal caliber bowel. Normal appendix. No significant diverticular disease. Vasculature: Atherosclerosis. Normal caliber abdominal aorta. Bones and Soft Tissues: Fat containing right inguinal hernia. Degenerative changes in the visualized spine and pelvis. Retroperitoneum/Mesentery: No intraperitoneal free air, ascites or fluid collection. No lymphadenopathy in the abdomen or pelvis. Stranding/fluid throughout the retroperitoneum. 1.  Left ureteral stent in place. Mild bilateral urinary tract dilatation with new right perinephric and periureteral stranding raises concern for urinary tract infection. No urinary stones. 2.  Suprapubic catheter in the decompressed urinary bladder. 3.  Ill-defined stranding/fluid throughout the retroperitoneum may relate to vascular congestion or reactive changes. No intraperitoneal free air or abscess.      XR ABDOMEN (KUB) (SINGLE AP VIEW)    Result Date: 9/8/2022  EXAMINATION: ONE SUPINE XRAY VIEW(S) OF THE ABDOMEN 9/8/2022 7:35 pm COMPARISON: 09/01/2022 HISTORY: ORDERING SYSTEM PROVIDED HISTORY: evaluate for ileus TECHNOLOGIST PROVIDED HISTORY: Reason for exam:->evaluate for ileus Reason for Exam: evaluate for ileus Additional signs and symptoms: nausea vomiting Relevant Medical/Surgical History: UTI FINDINGS: Lung bases are grossly clear. Dilated gas-filled loops bowel are nonspecific and can represent mild ileus versus partial obstruction. Left-sided double-J ureteral stent partially visualized and evaluated. Hopper catheter identified. Surgical identified within the pelvis noted. There are few scattered pelvic phleboliths. Prominent loops of bowel may represent persistent ileus or partial obstruction. FL LESS THAN 1 HOUR    Result Date: 9/7/2022  Radiology exam is complete. No Radiologist dictation. Please follow up with ordering provider. XR CHEST PORTABLE    Result Date: 9/10/2022  EXAMINATION: ONE XRAY VIEW OF THE CHEST 9/9/2022 10:20 pm COMPARISON: 09/09/2022 at 1018 hours HISTORY: ORDERING SYSTEM PROVIDED HISTORY: Verify left PICC placement TECHNOLOGIST PROVIDED HISTORY: Reason for exam:->Verify left PICC placement Reason for Exam: Verify left PICC placement FINDINGS: Left-sided PICC terminates within the region the confluence of the SVC. The cardiomediastinal silhouette is mildly prominent size. Mild perihilar congestion/interstitial edema. The lungs are clear. No pleural effusion or pneumothorax is present. Satisfactory position of the PICC. No acute cardiopulmonary process     XR CHEST PORTABLE    Result Date: 9/9/2022  EXAMINATION: ONE XRAY VIEW OF THE CHEST 9/9/2022 10:12 am COMPARISON: 09/06/2022 HISTORY: ORDERING SYSTEM PROVIDED HISTORY: fever TECHNOLOGIST PROVIDED HISTORY: Reason for exam:->fever Reason for Exam: fever FINDINGS: The lungs are without acute focal process. There is no effusion or pneumothorax. The cardiomediastinal silhouette is without acute process. The osseous structures are without acute process. No acute process.      XR CHEST PORTABLE    Result Date: 9/6/2022  EXAMINATION: ONE XRAY VIEW OF THE CHEST 9/6/2022 1:57 pm COMPARISON: 08/28/2022 HISTORY: ORDERING SYSTEM PROVIDED HISTORY: cough TECHNOLOGIST PROVIDED HISTORY: Reason for exam:->cough Reason for Exam: cough FINDINGS: The lungs appear clear. There are no pulmonary nodules, masses or infiltrates. There are no pleural effusions and there is no evidence of pneumothorax. The heart and mediastinal structures appear normal.  Bony structures appear unremarkable. No abnormalities noted. XR CHEST PORTABLE    Result Date: 8/28/2022  EXAMINATION: ONE XRAY VIEW OF THE CHEST 8/28/2022 3:37 pm COMPARISON: 04/18/2022 HISTORY: ORDERING SYSTEM PROVIDED HISTORY: emergency TECHNOLOGIST PROVIDED HISTORY: Reason for exam:->emergency Reason for Exam: fatigue, emergency, fever FINDINGS: Increased interstitial opacities seen throughout both lungs. A focal infiltrate is not identified. The cardial pericardial silhouette is unremarkable. No pneumothorax is seen. No free air. No acute bony abnormality. Increased interstitial opacity. While much or all of this may be chronic, please correlate with any clinical evidence of acute interstitial edema. XR CHEST 1 VIEW    Result Date: 9/10/2022  EXAMINATION: ONE XRAY VIEW OF THE CHEST 9/10/2022 12:11 am COMPARISON: Chest radiograph dated September 9, 2022 HISTORY: ORDERING SYSTEM PROVIDED HISTORY: recent CVL insertion TECHNOLOGIST PROVIDED HISTORY: Reason for exam:->recent CVL insertion Reason for Exam: recent CVL insertion FINDINGS: There has been interval placement of a right IJ line with the tip overlying the distal SVC/right atrial region. The cardiomediastinal silhouette is stable. The previously noted left-sided PICC line has been removed. Bibasilar atelectasis with low lung volumes are noted. There is no significant change. 1. Right IJ line with the tip overlying the distal SVC/right atrial region. 2. Low lung volumes with bibasilar atelectasis. No significant change. US ABDOMEN LIMITED Specify organ?  LIVER, GALLBLADDER    Result Date: 9/3/2022  EXAMINATION: RIGHT UPPER QUADRANT ULTRASOUND 9/3/2022 4:43 am COMPARISON: CT 09/01/2022, ultrasound 05/06/2020 HISTORY: ORDERING SYSTEM PROVIDED HISTORY: abdominal pain TECHNOLOGIST PROVIDED HISTORY: Reason for exam:->abdominal pain Specify organ?->LIVER Specify organ?->GALLBLADDER FINDINGS: LIVER:  The liver demonstrates normal echogenicity without evidence of intrahepatic biliary ductal dilatation. BILIARY SYSTEM:  Gallbladder is unremarkable without evidence of pericholecystic fluid, wall thickening or stones. Negative sonographic Lima's sign. Common bile duct is within normal limits measuring 3 mm. RIGHT KIDNEY: The right kidney is grossly unremarkable without evidence of hydronephrosis. PANCREAS:  Visualized portions of the pancreas are unremarkable. OTHER: No evidence of right upper quadrant ascites. Unremarkable right upper quadrant ultrasound. VL DUP UPPER EXTREMITY VENOUS RIGHT    Result Date: 9/9/2022  EXAMINATION: DUPLEX ULTRASOUND OF THE RIGHT UPPER EXTREMITY FOR DVT, 9/9/2022 11:12 am TECHNIQUE: Duplex ultrasound using B-mode/gray scaled imaging and Doppler spectral analysis and color flow was obtained of the deep venous structures of the upper right extremity. COMPARISON: None. HISTORY: ORDERING SYSTEM PROVIDED HISTORY: swollen right upper extremity TECHNOLOGIST PROVIDED HISTORY: Please do the doppler ultrasound of right upper extremity to r/o DVT Reason for exam:->swollen right upper extremity FINDINGS: There is normal flow and compressibility of the visualized venous structures. There is no evidence of echogenic thrombus. The veins demonstrate good compressibility with normal color flow study and spectral analysis. The exam is suboptimal.  The brachial and basilic veins are not well visualized due to Tegaderm     No evidence of DVT within the right upper extremity. All labs, medications and tests reviewed by myself including data  from outside source , patient and available family . Continue all other medications of all above medical condition listed as is. Impression:  Principal Problem:    Complicated UTI (urinary tract infection)  Active Problems:    Anemia of chronic disorder    Fungemia    Chronic kidney disease, stage 3a (HCC)    SVT (supraventricular tachycardia) ([facility])  Resolved Problems:    * No resolved hospital problems. *      Assessment: 78 y. o.year old with PMH of  has a past medical history of Acute urinary tract infection, Ataxia, Diabetes mellitus ([address] Fusion of spine of cervical region, Gait disturbance, History of prostate cancer, History of tobacco use, Hyperlipidemia, and Osteoarthritis. Plan and Recommendations:    Keep n.p.o. after midnight we will plan RAFFI in the morning  Candida fungemia as per primary team  Hypertension continue amlodipine 5 mg daily and carvedilol 6.25 twice daily no episodes of SVT so far  DVT prophylaxis if no contraindication  6. Dyslipidemia: continue statins           Thank you  much for consult and giving us the opportunity in contributing in the care of this patient. Please feel free to call me for any questions.        Christopher Rooney MD, 9/13/2022 2:57 PM

## 2022-09-13 NOTE — PROGRESS NOTES
Comprehensive Nutrition Assessment    Type and Reason for Visit:  Reassess    Nutrition Recommendations/Plan:   Continue current carb controlled diet   Continue to offer oral nutrition supplement during stay  Encourage consistent meal intake      Malnutrition Assessment:  Malnutrition Status: At risk for malnutrition (Comment) (09/08/22 1229)    Context:  Acute Illness       Nutrition Assessment:    Remains on carb controlled diet with low calorie, high protein oral nutrition supplement. Meal intake at recent meals 25-50% but drinking more than 75% of supplement. Will continue to follow at moderate nutrition risk at this time. Nutrition Related Findings:    resting in bed on visit, noted some nausea/upset stomach. glucose POCT over 200 mg/dL, now s/p cystoscopy and ureteral stent exchange Wound Type: None       Current Nutrition Intake & Therapies:    Average Meal Intake: 26-50%  Average Supplements Intake: %  ADULT DIET; Regular; 4 carb choices (60 gm/meal)  ADULT ORAL NUTRITION SUPPLEMENT; Breakfast, Dinner; Low Calorie/High Protein Oral Supplement  Diet NPO    Anthropometric Measures:  Height: 5' 8\" (172.7 cm)  Ideal Body Weight (IBW): 154 lbs (70 kg)    Admission Body Weight: 198 lb (89.8 kg) (stated)  Current Body Weight: 210 lb 15.7 oz (95.7 kg), 133.8 % IBW. Weight Source: Bed Scale  Current BMI (kg/m2): 32.1  Usual Body Weight: 210 lb (95.3 kg) (Feb 2022)  % Weight Change (Calculated): -1.9                    BMI Categories: Obese Class 1 (BMI 30.0-34. 9)    Estimated Daily Nutrient Needs:  Energy Requirements Based On: Formula  Weight Used for Energy Requirements: Current  Energy (kcal/day): 2079  Weight Used for Protein Requirements: Ideal  Protein (g/day):   Method Used for Fluid Requirements: 1 ml/kcal  Fluid (ml/day): 2079    Nutrition Diagnosis:   Predicted inadequate energy intake related to increase demand for energy/nutrients (lethargy and sepsis) as evidenced by weight loss    Nutrition Interventions:   Food and/or Nutrient Delivery: Continue Current Diet, Continue Oral Nutrition Supplement  Nutrition Education/Counseling: No recommendation at this time  Coordination of Nutrition Care: Continue to monitor while inpatient  Plan of Care discussed with: patient    Goals:  Previous Goal Met: Progressing toward Goal(s)  Goals: PO intake 75% or greater       Nutrition Monitoring and Evaluation:   Behavioral-Environmental Outcomes: None Identified  Food/Nutrient Intake Outcomes: Food and Nutrient Intake, Supplement Intake  Physical Signs/Symptoms Outcomes: Biochemical Data, Meal Time Behavior, Skin, Weight    Discharge Planning:    Continue current diet, Continue Oral Nutrition Supplement     Shelby Garduno RD, LD  Contact: [phone]

## 2022-09-13 NOTE — PROGRESS NOTES
V2.0  [facility] Hospitalist Progress Note      Name:  Thomas Aguirre /Age/Sex: 1940  (78 y.o. male)   MRN & CSN:  [id number] & [id number] Encounter Date/Time: 2022 6:54 PM EDT    Location:  -A PCP: Sapphire Clark MD       Hospital Day: 17    Assessment and Plan:   Thomas Aguirre is a 78 y.o. male with pmh of prostate cancer, diabetes mellitus, hypertension who admitted with sciatic found out with Complicated UTI (urinary tract infection)    # Candida albicans fungemia  -Transthoracic echo inconclusive  - Spoke to ophthalmology, recommended checking visual acuity, per my evaluation, patient's vision is 20/20 bilaterally (with glasses on)    Plan:  Cardiology consulted for RAFFI  MRSA nares  ID following, appreciate recs  Continue Eraxis (started )  Follow-up on central line tip culture and repeat blood cultures     # ESBL bacteremia: Secondary to UTI  # Complicated UTI  #Candida UTI  - Urine culture on  growing E. coli resistant to Cipro and cefepime  - Urine culture on  no significant growth  - CT abdomen/pelvis showing left ureteral stent in place, mild bilateral urinary tract dilatation with new right perinephric and periureteral stranding raises concern for UTI     # Chronic urinary retention managed with suprapubic catheter and exchanged monthly, last exchange  with a cystoscopy patient had hematuria and patient became lethargic and drowsy with elevated leukocytes discussed with ID started on vancomycin and fluconazole and  obtaining blood cultures and urine cultures urine and blood cultures came back positive for Candida albicans treated as above  # Hematuria s/p cystoscopy and exchange of stent: Still urine in the bag looks bloody  - Urology following recommended to manually irrigate bladder every 4 hours and as needed clots     # Patient had right upper extremity swelling, obtained ultrasound right upper extremity negative for DVT but patient have swelling of the right, left, lower extremities suspected secondary to PAMELLA and fluid overload  - Nephrology following     # Normocytic anemia   - Continue with H&H  - Transfuse if hemoglobin less than 7     # History of prostate cancer status post prostatectomy 1996  # Hypertension: Blood pressure stable continue on current medication   # Hypothyroidism continue levothyroxine   # Type 2 diabetes: Continue SSI, hypoglycemic protocol diabetic diet      Diet ADULT DIET; Regular; 4 carb choices (60 gm/meal)  ADULT ORAL NUTRITION SUPPLEMENT; Breakfast, Dinner; Low Calorie/High Protein Oral Supplement  Diet NPO   DVT Prophylaxis [x] Lovenox, []  Heparin, [] SCDs, [] Ambulation,  [] Eliquis, [] Xarelto  [] Coumadin   Code Status Full Code   Disposition From: Home  Expected Disposition: Home versus SNF  Estimated Date of Discharge: Greater than 2 days  Patient requires continued admission due to candidemia, rule out IE, UTI   Surrogate Decision Maker/ [facility] [facility]. Subjective:     Chief Complaint: Fatigue (General weakness started this am. Family called because pt had changed from baseline. Currently being tx for UTI. Pt is slightly confused, doesn't know the year. He has been N/V. )     Patient states he is feeling pretty well today. He has no complaints. Denies fevers or chills or chest pain. States that his hand swelling has improved significantly. Review of Systems:    General: No fever, no chills  Eyes: No blurry vision  Heart: No chest pain, no palpitations  Lungs: No shortness of breath, no cough  Abdomen: No abdominal pain, no nausea, no vomiting, no constipation, no diarrhea  : No frequency, no dysuria, no urgency, no decreased urination  MSK: No lower extremity swelling, +bilateral hand swelling  Neuro: No confusion, no weakness  Skin: No rashes    Objective:      Intake/Output Summary (Last 24 hours) at 9/13/2022 1854  Last data filed at 9/13/2022 1816  Gross per 24 hour Intake 120 ml   Output 1175 ml   Net -1055 ml        Vitals:   Vitals:    09/13/22 1617   BP: (!) 132/58   Pulse: 61   Resp: 15   Temp: 98 °F (36.7 °C)   SpO2: 99%       Physical Exam:     General: NAD, AAO x3-4  Eyes: EOMI, no scleral icterus, no conjunctival pallor  HENT: Atraumatic, no lymphadenopathy, neck supple  CVS: Normal S1 and S2, 2/6 murmur  Resp: Normal breath sounds, clear to auscultation bilaterally, no respiratory distress  GI: Normal bowel sounds, soft, nondistended, nontender  MSK: Normal ROM, no lower extremity edema, bilateral hand swelling  Skin: Intact, dry, warm, no rashes  Neuro: CN II to XII grossly intact  Psych: Normal mood    Medications:   Medications:    insulin glargine  15 Units SubCUTAneous Nightly    insulin lispro  0-8 Units SubCUTAneous 2 times per day    anidulafungin  100 mg IntraVENous Q24H    insulin lispro  0-8 Units SubCUTAneous TID WC    Cefiderocol Sulfate Tosylate  1,000 mg IntraVENous Q8H    lidocaine PF  5 mL IntraDERmal Once    sodium chloride flush  5-40 mL IntraVENous 2 times per day    sodium chloride flush  5-40 mL IntraVENous 2 times per day    meperidine  12.5 mg IntraVENous Once    ferrous sulfate  325 mg Oral BID WC    carvedilol  6.25 mg Oral BID WC    lidocaine  1 patch TransDERmal Daily    amLODIPine  10 mg Oral Daily    aspirin  81 mg Oral Daily    atorvastatin  80 mg Oral Nightly    levothyroxine  75 mcg Oral Daily    melatonin  10 mg Oral Nightly    pantoprazole  40 mg Oral QAM AC    sodium chloride flush  5-40 mL IntraVENous 2 times per day    [Held by provider] enoxaparin  40 mg SubCUTAneous QPM      Infusions:    sodium chloride      sodium chloride      sodium chloride 25 mL/hr at 09/13/22 0248    sodium chloride      dextrose      sodium chloride 25 mL (09/06/22 2327)    dextrose       PRN Meds: sodium chloride, , PRN  bisacodyl, 10 mg, Daily PRN  docusate sodium, 100 mg, BID PRN  polyethylene glycol, 17 g, Daily PRN  sodium chloride, , PRN  sodium chloride flush, 5-40 mL, PRN  sodium chloride, , PRN  sodium chloride flush, 5-40 mL, PRN  sodium chloride, 25 mL, PRN  HYDROmorphone, 0.25 mg, Q5 Min PRN  fentanNYL, 50 mcg, Q5 Min PRN  glucose, 4 tablet, PRN  dextrose bolus, 125 mL, PRN   Or  dextrose bolus, 250 mL, PRN  glucagon (rDNA), 1 mg, PRN  dextrose, , Continuous PRN  guaiFENesin-dextromethorphan, 5 mL, Q4H PRN  magnesium hydroxide, 30 mL, Daily PRN  sodium chloride flush, 5-40 mL, PRN  sodium chloride, , PRN  ondansetron, 4 mg, Q8H PRN   Or  ondansetron, 4 mg, Q6H PRN  aluminum & magnesium hydroxide-simethicone, 30 mL, Q6H PRN  acetaminophen, 650 mg, Q6H PRN   Or  acetaminophen, 650 mg, Q6H PRN  glucose, 4 tablet, PRN  dextrose bolus, 125 mL, PRN   Or  dextrose bolus, 250 mL, PRN  glucagon (rDNA), 1 mg, PRN  dextrose, , Continuous PRN  ipratropium-albuterol, 1 vial, BID PRN        Labs      Recent Results (from the past 24 hour(s))   Culture, IV Catheter    Collection Time: 09/12/22  8:06 PM    Specimen: CVP   Result Value Ref Range    Specimen CVP TIP     Special Requests NONE     Culture Prelim Report No growth to date    POCT Glucose    Collection Time: 09/12/22  8:45 PM   Result Value Ref Range    POC Glucose 222 (H) 70 - 99 MG/DL   POCT Glucose    Collection Time: 09/13/22  2:02 AM   Result Value Ref Range    POC Glucose 145 (H) 70 - 99 MG/DL   POCT Glucose    Collection Time: 09/13/22  6:10 AM   Result Value Ref Range    POC Glucose 123 (H) 70 - 99 MG/DL   POCT Glucose    Collection Time: 09/13/22 11:14 AM   Result Value Ref Range    POC Glucose 184 (H) 70 - 99 MG/DL   Basic Metabolic Panel    Collection Time: 09/13/22 12:48 PM   Result Value Ref Range    Sodium 141 135 - 145 MMOL/L    Potassium 4.0 3.5 - 5.1 MMOL/L    Chloride 105 99 - 110 mMol/L    CO2 24 21 - 32 MMOL/L    Anion Gap 12 4 - 16    BUN 37 (H) 6 - 23 MG/DL    Creatinine 3.1 (H) 0.9 - 1.3 MG/DL    Glucose 190 (H) 70 - 99 MG/DL    Calcium 7.8 (L) 8.3 - 10.6 MG/DL    GFR Non- American 19 (L) >60 mL/min/1.73m2    GFR  23 (L) >60 mL/min/1.73m2   CBC with Auto Differential    Collection Time: 09/13/22 12:48 PM   Result Value Ref Range    WBC 7.7 4.0 - 10.5 K/CU MM    RBC 2.99 (L) 4.6 - 6.2 M/CU MM    Hemoglobin 9.0 (L) 13.5 - 18.0 GM/DL    Hematocrit 28.0 (L) 42 - 52 %    MCV 93.6 78 - 100 FL    MCH 30.1 27 - 31 PG    MCHC 32.1 32.0 - 36.0 %    RDW 15.3 (H) 11.7 - 14.9 %    Platelets 690 628 - 717 K/CU MM    MPV 9.9 7.5 - 11.1 FL    Differential Type AUTOMATED DIFFERENTIAL     Segs Relative 65.7 36 - 66 %    Lymphocytes % 18.9 (L) 24 - 44 %    Monocytes % 7.8 (H) 0 - 4 %    Eosinophils % 6.4 (H) 0 - 3 %    Basophils % 0.7 0 - 1 %    Segs Absolute 5.1 K/CU MM    Lymphocytes Absolute 1.5 K/CU MM    Monocytes Absolute 0.6 K/CU MM    Eosinophils Absolute 0.5 K/CU MM    Basophils Absolute 0.1 K/CU MM    Nucleated RBC % 0.0 %    Total Nucleated RBC 0.0 K/CU MM    Total Immature Neutrophil 0.04 K/CU MM    Immature Neutrophil % 0.5 (H) 0 - 0.43 %   Magnesium    Collection Time: 09/13/22 12:48 PM   Result Value Ref Range    Magnesium 2.4 1.8 - 2.4 mg/dl   Phosphorus    Collection Time: 09/13/22 12:48 PM   Result Value Ref Range    Phosphorus 3.1 2.5 - 4.9 MG/DL   POCT Glucose    Collection Time: 09/13/22  4:18 PM   Result Value Ref Range    POC Glucose 166 (H) 70 - 99 MG/DL        Imaging/Diagnostics Last 24 Hours   Echocardiogram complete 2D with doppler with color    Result Date: 9/12/2022  Transthoracic Echocardiography Report (TTE)  Demographics   Patient Name      Rut MARCIAL     Date of Study       09/12/2022   Date of Birth     1940         Gender              Male   Age               78 year(s)         Race                Black   Patient Number    [id number]         Room Number         2006   Visit Number      [id number]   Corporate ID      [id number]   Accession Number  [id number]         Shannon Cantu Shon Borden  Interpreting        Alem Myles  Physician         [facility]         Physician           MD  Procedure Type of Study   TTE procedure:ECHOCARDIOGRAM COMPLETE 2D W DOPPLER W COLOR. Procedure Date Date: 09/12/2022 Start: 02:23 PM Study Location: Portable Technical Quality: Adequate visualization Indications:Endocarditis. Patient Status: Routine Height: 68 inches Weight: 210 pounds BSA: 2.09 m2 BMI: 31.93 kg/m2 HR: 68 bpm BP: 141/57 mmHg  Conclusions   Summary  Left ventricular systolic function is normal.  Ejection fraction is visually estimated at 60%. Grade II diastolic dysfunction. Cannot rule out endocarditis on the tricuspid valve. No evidence of any pericardial effusion. Signature   ------------------------------------------------------------------  Electronically signed by Alem Myles MD (Interpreting  physician) on 09/12/2022 at 06:43 PM  ------------------------------------------------------------------   Findings   Left Ventricle  Left ventricular systolic function is normal.  Ejection fraction is visually estimated at 60%. No regional wall motion abnormalites. Grade II diastolic dysfunction. Left ventricle size is normal.   Left Atrium  Essentially normal left atrium. Right Atrium  Essentially normal right atrium. Right Ventricle  Essentially normal right ventricle. Aortic Valve  Sclerotic, but non-stenotic aortic valve. No significant valvular disease noted. Mitral Valve  Structurally normal mitral valve. Mild mitral regurgitation. Tricuspid Valve  Tricuspid valve is structurally normal.  Mild tricuspid regurgitation. Cannot rule out endocarditis on the tricuspid valve. Pulmonic Valve  Pulmonic valve is structurally normal.  No significant valvular disease noted. Pericardial Effusion  No evidence of any pericardial effusion. Pleural Effusion  No evidence of pleural effusion.    Miscellaneous  IVC and abdominal aorta are within normal limits.   M-Mode/2D Measurements & Calculations   LV Diastolic Dimension:  LV Systolic Dimension:  LA Dimension: 3.7 cmAO Root  3.68 cm                  2.44 cm                 Dimension: 3.3 cmLA Area:  LV FS:33.7 %             LV Volume Diastolic: 62 04.2 cm2  LV PW Diastolic: 1.3 cm  ml  LV PW Systolic: 5.02 cm  LV Volume Systolic: 26  Septum Diastolic: 7.94   ml  cm                       LV EDV/LV EDV Index: 62  Septum Systolic: 9.79 cm QA/30 L0FK ESV/LV ESV   LA/Aorta: 1.12  CO: 4.47 l/min           Index: 26 ml/12 m2      Ascending Aorta: 3.4 cm  CI: 2.14 l/m*m2          EF Calculated (A4C):    LA volume/Index: 27 ml                           58.1 %                  /42U8  LV Area Diastolic: 24    EF Calculated (2D):  cm2                      63.4 %  LV Area Systolic: 27.4  cm2                      LV Length: 7.77 cm                            LVOT: 2.1 cm  Doppler Measurements & Calculations   MV Peak E-Wave: 110     AV Peak Velocity: 150 cm/s  LVOT Peak Velocity: 88  cm/s                    AV Peak Gradient: 9 mmHg    cm/s  MV Peak A-Wave: 89.3    AV Mean Velocity: 107 cm/s  LVOT Mean Velocity: 65  cm/s                    AV Mean Gradient: 5 mmHg    cm/s  MV E/A Ratio: 1.23      AV VTI: 31.9 cm             LVOT Peak Gradient: 3  MV Peak Gradient: 4.84  AV Area (Continuity):2.06   mmHgLVOT Mean Gradient:  mmHg                    cm2                         2 mmHg  MV Mean Gradient: 4                                 Estimated RVSP: 35 mmHg  mmHg                    LVOT VTI: 19 cm             Estimated RAP:3 mmHg  MV Mean Velocity: 90.2  cm/s                    Estimated PASP: 35.49 mmHg  MV Deceleration Time:                               TR Velocity:285 cm/s  203 msec                                            TR Gradient:32.49 mmHg   MV Area (continuity):  1.67 cm2  MV E' Septal Velocity:  8.33 cm/s  MV A' Septal Velocity:  7.13 cm/s  MV E' Lateral Velocity:  8.11 cm/s  MV A' Lateral Velocity:  9.32 cm/s  MV E/E' septal: 13.21  MV E/E' lateral: 13.56        Electronically signed by Ayush Arauz MD on 9/13/2022 at 6:54 PM

## 2022-09-14 LAB
ALBUMIN SERPL-MCNC: 2.4 GM/DL (ref 3.4–5)
ALP BLD-CCNC: 93 IU/L (ref 40–128)
ALT SERPL-CCNC: 7 U/L (ref 10–40)
ANION GAP SERPL CALCULATED.3IONS-SCNC: 11 MMOL/L (ref 4–16)
AST SERPL-CCNC: 19 IU/L (ref 15–37)
BASOPHILS ABSOLUTE: 0.1 K/CU MM
BASOPHILS RELATIVE PERCENT: 0.6 % (ref 0–1)
BILIRUB SERPL-MCNC: 0.2 MG/DL (ref 0–1)
BUN BLDV-MCNC: 36 MG/DL (ref 6–23)
CALCIUM SERPL-MCNC: 7.9 MG/DL (ref 8.3–10.6)
CHLORIDE BLD-SCNC: 104 MMOL/L (ref 99–110)
CO2: 25 MMOL/L (ref 21–32)
CREAT SERPL-MCNC: 3 MG/DL (ref 0.9–1.3)
CULTURE: ABNORMAL
CULTURE: ABNORMAL
DIFFERENTIAL TYPE: ABNORMAL
EOSINOPHILS ABSOLUTE: 0.5 K/CU MM
EOSINOPHILS RELATIVE PERCENT: 6.6 % (ref 0–3)
GFR AFRICAN AMERICAN: 24 ML/MIN/1.73M2
GFR NON-AFRICAN AMERICAN: 20 ML/MIN/1.73M2
GLUCOSE BLD-MCNC: 108 MG/DL (ref 70–99)
GLUCOSE BLD-MCNC: 118 MG/DL (ref 70–99)
GLUCOSE BLD-MCNC: 150 MG/DL (ref 70–99)
GLUCOSE BLD-MCNC: 90 MG/DL (ref 70–99)
GLUCOSE BLD-MCNC: 91 MG/DL (ref 70–99)
HCT VFR BLD CALC: 25.4 % (ref 42–52)
HEMOGLOBIN: 8.2 GM/DL (ref 13.5–18)
IMMATURE NEUTROPHIL %: 0.6 % (ref 0–0.43)
LYMPHOCYTES ABSOLUTE: 1.5 K/CU MM
LYMPHOCYTES RELATIVE PERCENT: 18.5 % (ref 24–44)
Lab: ABNORMAL
MCH RBC QN AUTO: 30 PG (ref 27–31)
MCHC RBC AUTO-ENTMCNC: 32.3 % (ref 32–36)
MCV RBC AUTO: 93 FL (ref 78–100)
MONOCYTES ABSOLUTE: 0.7 K/CU MM
MONOCYTES RELATIVE PERCENT: 8.3 % (ref 0–4)
NUCLEATED RBC %: 0 %
PDW BLD-RTO: 15.3 % (ref 11.7–14.9)
PLATELET # BLD: 296 K/CU MM (ref 140–440)
PMV BLD AUTO: 10.1 FL (ref 7.5–11.1)
POTASSIUM SERPL-SCNC: 3.6 MMOL/L (ref 3.5–5.1)
PRO-BNP: 6587 PG/ML
RBC # BLD: 2.73 M/CU MM (ref 4.6–6.2)
SEGMENTED NEUTROPHILS ABSOLUTE COUNT: 5.3 K/CU MM
SEGMENTED NEUTROPHILS RELATIVE PERCENT: 65.4 % (ref 36–66)
SODIUM BLD-SCNC: 140 MMOL/L (ref 135–145)
SPECIMEN: ABNORMAL
TOTAL IMMATURE NEUTOROPHIL: 0.05 K/CU MM
TOTAL NUCLEATED RBC: 0 K/CU MM
TOTAL PROTEIN: 5 GM/DL (ref 6.4–8.2)
WBC # BLD: 8.1 K/CU MM (ref 4–10.5)

## 2022-09-14 PROCEDURE — 2580000003 HC RX 258: Performed by: INTERNAL MEDICINE

## 2022-09-14 PROCEDURE — 1200000000 HC SEMI PRIVATE

## 2022-09-14 PROCEDURE — 6370000000 HC RX 637 (ALT 250 FOR IP): Performed by: SPECIALIST

## 2022-09-14 PROCEDURE — 6360000002 HC RX W HCPCS: Performed by: SPECIALIST

## 2022-09-14 PROCEDURE — 85025 COMPLETE CBC W/AUTO DIFF WBC: CPT

## 2022-09-14 PROCEDURE — 99233 SBSQ HOSP IP/OBS HIGH 50: CPT | Performed by: NURSE PRACTITIONER

## 2022-09-14 PROCEDURE — 2580000003 HC RX 258: Performed by: NURSE PRACTITIONER

## 2022-09-14 PROCEDURE — 93312 ECHO TRANSESOPHAGEAL: CPT

## 2022-09-14 PROCEDURE — 97530 THERAPEUTIC ACTIVITIES: CPT

## 2022-09-14 PROCEDURE — 6360000002 HC RX W HCPCS: Performed by: INTERNAL MEDICINE

## 2022-09-14 PROCEDURE — 99233 SBSQ HOSP IP/OBS HIGH 50: CPT | Performed by: INTERNAL MEDICINE

## 2022-09-14 PROCEDURE — 7100000001 HC PACU RECOVERY - ADDTL 15 MIN

## 2022-09-14 PROCEDURE — 80053 COMPREHEN METABOLIC PANEL: CPT

## 2022-09-14 PROCEDURE — 82962 GLUCOSE BLOOD TEST: CPT

## 2022-09-14 PROCEDURE — 97535 SELF CARE MNGMENT TRAINING: CPT

## 2022-09-14 PROCEDURE — B246ZZ4 ULTRASONOGRAPHY OF RIGHT AND LEFT HEART, TRANSESOPHAGEAL: ICD-10-PCS | Performed by: INTERNAL MEDICINE

## 2022-09-14 PROCEDURE — 6370000000 HC RX 637 (ALT 250 FOR IP): Performed by: INTERNAL MEDICINE

## 2022-09-14 PROCEDURE — 36415 COLL VENOUS BLD VENIPUNCTURE: CPT

## 2022-09-14 PROCEDURE — 94150 VITAL CAPACITY TEST: CPT

## 2022-09-14 PROCEDURE — 6360000002 HC RX W HCPCS: Performed by: NURSE PRACTITIONER

## 2022-09-14 PROCEDURE — 83880 ASSAY OF NATRIURETIC PEPTIDE: CPT

## 2022-09-14 PROCEDURE — 94761 N-INVAS EAR/PLS OXIMETRY MLT: CPT

## 2022-09-14 PROCEDURE — 2580000003 HC RX 258: Performed by: STUDENT IN AN ORGANIZED HEALTH CARE EDUCATION/TRAINING PROGRAM

## 2022-09-14 PROCEDURE — 7100000000 HC PACU RECOVERY - FIRST 15 MIN

## 2022-09-14 RX ADMIN — AMLODIPINE BESYLATE 10 MG: 10 TABLET ORAL at 09:38

## 2022-09-14 RX ADMIN — DEXTROSE MONOHYDRATE 100 MG: 50 INJECTION, SOLUTION INTRAVENOUS at 14:59

## 2022-09-14 RX ADMIN — CARVEDILOL 6.25 MG: 6.25 TABLET, FILM COATED ORAL at 09:38

## 2022-09-14 RX ADMIN — ATORVASTATIN CALCIUM 80 MG: 40 TABLET, FILM COATED ORAL at 20:45

## 2022-09-14 RX ADMIN — CEFIDEROCOL SULFATE TOSYLATE 1000 MG: 1 INJECTION, POWDER, FOR SOLUTION INTRAVENOUS at 09:42

## 2022-09-14 RX ADMIN — CEFIDEROCOL SULFATE TOSYLATE 1000 MG: 1 INJECTION, POWDER, FOR SOLUTION INTRAVENOUS at 19:03

## 2022-09-14 RX ADMIN — FERROUS SULFATE TAB 325 MG (65 MG ELEMENTAL FE) 325 MG: 325 (65 FE) TAB at 16:42

## 2022-09-14 RX ADMIN — Medication 10 MG: at 20:44

## 2022-09-14 RX ADMIN — CARVEDILOL 6.25 MG: 6.25 TABLET, FILM COATED ORAL at 16:42

## 2022-09-14 RX ADMIN — SODIUM CHLORIDE, PRESERVATIVE FREE 10 ML: 5 INJECTION INTRAVENOUS at 20:45

## 2022-09-14 RX ADMIN — INSULIN GLARGINE 10 UNITS: 100 INJECTION, SOLUTION SUBCUTANEOUS at 20:50

## 2022-09-14 RX ADMIN — FERROUS SULFATE TAB 325 MG (65 MG ELEMENTAL FE) 325 MG: 325 (65 FE) TAB at 09:38

## 2022-09-14 RX ADMIN — CEFIDEROCOL SULFATE TOSYLATE 1000 MG: 1 INJECTION, POWDER, FOR SOLUTION INTRAVENOUS at 03:53

## 2022-09-14 RX ADMIN — ONDANSETRON 4 MG: 2 INJECTION INTRAMUSCULAR; INTRAVENOUS at 13:06

## 2022-09-14 RX ADMIN — SODIUM CHLORIDE, PRESERVATIVE FREE 10 ML: 5 INJECTION INTRAVENOUS at 09:39

## 2022-09-14 RX ADMIN — ASPIRIN 81 MG CHEWABLE TABLET 81 MG: 81 TABLET CHEWABLE at 09:38

## 2022-09-14 NOTE — PROGRESS NOTES
[facility] [address]   Progress Note  [facility] 0 1 2      Date: 2022   Patient: Della Prado   : 1940   DOA: 2022   MRN: [id number]   ROOM#: -[id number]     Admit Date: 2022     Collaborating Urologist on Call at time of admission: Dr. Barrett Lehman  CC: Fatigue  Reason for Consult: Hematuria with acute blood loss anemia  POD #7: Cystoscopy, left ureteral stent exchange, suprapubic tube exchange. Subjective:     Pain: mild, nausea and no vomiting,   Bowel Movement/Flatus: Yes  Voiding: SPT in place, urine clear yellow in tubing. Pt resting in bed, states he is feeling OK today. Objective:    Vitals:    /63   Pulse 67   Temp 98 °F (36.7 °C) (Oral)   Resp 17   Ht 5' 8\" (1.727 m)   Wt 210 lb 15.4 oz (95.7 kg)   SpO2 98%   BMI 32.08 kg/m²    Temp  Av °F (36.7 °C)  Min: 97.9 °F (36.6 °C)  Max: 98.2 °F (36.8 °C)     Intake/Output Summary (Last 24 hours) at 2022 0751  Last data filed at 2022 0400  Gross per 24 hour   Intake 120 ml   Output 2075 ml   Net -1955 ml       Physical Exam:  General appearance: alert, appears stated age, cooperative, fatigued, no distress, mildly obese and slowed mentation  Head: Normocephalic, without obvious abnormality, atraumatic  Back: No CVA tenderness  Abdomen: Soft, non-tender, non-distended.  SPT in place, urine clear yellow in tubing    Labs:   WBC:    Lab Results   Component Value Date/Time    WBC 8.1 2022 05:54 AM      Hemoglobin/Hematocrit:    Lab Results   Component Value Date/Time    HGB 8.2 2022 05:54 AM    HCT 25.4 2022 05:54 AM      BMP:   Lab Results   Component Value Date/Time     2022 05:54 AM    K 3.6 2022 05:54 AM    K 3.9 2018 04:42 AM     2022 05:54 AM    CO2 25 2022 05:54 AM    BUN 36 2022 05:54 AM    LABALBU 2.4 2022 05:54 AM    CREATININE 3.0 2022 05:54 AM    CALCIUM 7.9 2022 05:54 AM    GFRAA 24 2022 05:54 AM    LABGLOM 20 09/14/2022 05:54 AM      PT/INR:    Lab Results   Component Value Date/Time    PROTIME 14.2 09/12/2022 01:24 PM    PROTIME 15.2 01/24/2011 06:48 PM    INR 1.10 09/12/2022 01:24 PM      PTT:    Lab Results   Component Value Date/Time    APTT 37.6 09/12/2022 01:24 PM     Blood Culture: 2/4 bottles +Candida  Urine Culture:  Candida Albicans <10K CFU/ml    Imaging:   CT ABDOMEN PELVIS WO CONTRAST Additional Contrast? None    Result Date: 9/10/2022  EXAMINATION: CT OF THE ABDOMEN AND PELVIS WITHOUT CONTRAST 9/10/2022 4:35 pm TECHNIQUE: CT of the abdomen and pelvis was performed without the administration of intravenous contrast. Multiplanar reformatted images are provided for review. Automated exposure control, iterative reconstruction, and/or weight based adjustment of the mA/kV was utilized to reduce the radiation dose to as low as reasonably achievable. COMPARISON: CT abdomen and pelvis 09/01/2022 and 04/18/2022 HISTORY: ORDERING SYSTEM PROVIDED HISTORY: Recent stent placement/SPT exchange. Worsening fevers/leukocytosis. FINDINGS: Lower Chest: Trace left pleural effusion with left basilar relaxation atelectasis or infiltrate. Minimal subsegmental atelectasis posterior right lung base. Visualized portions of the cardiac and posterior mediastinal structures appear unremarkable with exception of a small hiatal hernia. Organs: Calcifications liver and spleen reflect sequela of old granulomatous disease (benign finding requiring no additional evaluation or follow-up). Normal attenuation throughout the liver. No discrete hepatic lesion or intrahepatic bile duct dilatation is seen. The gallbladder, RIGHT kidney, spleen, adrenal glands and pancreas appear unremarkable. Relative high density within the left urinary collecting system, mild to moderate severity hydronephrosis with double-J ureter stent in unremarkable position. No calculus evident along the course of the stent.   Prominent left perinephric stranding GI/Bowel: Small hiatal hernia. The intra-abdominal stomach and small bowel appear unremarkable. No diffuse or focal small bowel wall thickening or inflammatory changes evident. No obstruction is seen. The appendix is visualized right lower quadrant, unremarkable in appearance. The colon appears unremarkable. Pelvis: Urinary bladder decompressed by suprapubic catheter. Distal portion of left double-J ureter stent appears unremarkable in position. Surgical clips at the expected location of the prostate gland presumably status post prostatectomy. Trace simple appearing pelvic ascites posteriorly. No pneumoperitoneum. Vascular calcifications are noted reflecting calcific atherosclerosis. Peritoneum/Retroperitoneum: Perinephric fatty stranding on the left. Trace simple appearing left pericolic gutter ascites. No pneumoperitoneum. Calcific atherosclerosis aorta and branches without aneurysm. Inferior vena cava appears unremarkable. No adenopathy is seen. Bones/Soft Tissues: No acute superficial soft tissue or osseous structure abnormality evident. No suspicious lytic or blastic lesion evident. 1. Probable hematuria left urinary collecting system (possible active hemorrhage into the left urinary collecting system) and mild-to-moderate left hydronephrosis. 2. The double-J stent appears in unremarkable position without left ureter calculus evident. 3. Trace abdominopelvic ascites is probably reactive. 4.  Vascular calcifications are noted reflecting calcific atherosclerosis. 5. Trace left pleural effusion with left basilar relaxation atelectasis or infiltrate. 6. Minimal subsegmental atelectasis posterior right lung base. 7. Small hiatal hernia.      CT ABDOMEN PELVIS WO CONTRAST Additional Contrast? None    Result Date: 9/1/2022  EXAMINATION: CT OF THE ABDOMEN AND PELVIS WITHOUT CONTRAST 9/1/2022 3:06 pm TECHNIQUE: CT of the abdomen and pelvis was performed without the administration of intravenous contrast. Multiplanar reformatted images are provided for review. Automated exposure control, iterative reconstruction, and/or weight based adjustment of the mA/kV was utilized to reduce the radiation dose to as low as reasonably achievable. COMPARISON: 04/18/2022. HISTORY: ORDERING SYSTEM PROVIDED HISTORY: abdominal pain TECHNOLOGIST PROVIDED HISTORY: Reason for exam:->abdominal pain Additional Contrast?->None Reason for Exam: abdominal pain, POSSIBLE UTI FINDINGS: Lower Chest: The visualized lung bases demonstrate subsegmental atelectasis/scarring. Trace pericardial fluid. Questionable trace bilateral pleural fluid. Atherosclerosis in the visualized thoracic aorta. Coronary artery calcifications. Small hiatal hernia. Liver: Normal. Gallbladder and Bile Ducts: Normal. Spleen: Normal. Adrenal Glands: Normal. Pancreas: Normal. Genitourinary: Left ureteral stent with minimal dilatation of the left urinary tract. Mild dilatation of the right urinary tract. Mild dilatation of the right urinary tract with increased right perinephric and periureteral stranding. No definite urinary stones. Suprapubic catheter in the decompressed urinary bladder. Bowel: Normal caliber bowel. Normal appendix. No significant diverticular disease. Vasculature: Atherosclerosis. Normal caliber abdominal aorta. Bones and Soft Tissues: Fat containing right inguinal hernia. Degenerative changes in the visualized spine and pelvis. Retroperitoneum/Mesentery: No intraperitoneal free air, ascites or fluid collection. No lymphadenopathy in the abdomen or pelvis. Stranding/fluid throughout the retroperitoneum. 1.  Left ureteral stent in place. Mild bilateral urinary tract dilatation with new right perinephric and periureteral stranding raises concern for urinary tract infection. No urinary stones. 2.  Suprapubic catheter in the decompressed urinary bladder.  3.  Ill-defined stranding/fluid throughout the retroperitoneum may relate to vascular congestion or reactive changes. No intraperitoneal free air or abscess. XR ABDOMEN (KUB) (SINGLE AP VIEW)    Result Date: 9/8/2022  EXAMINATION: ONE SUPINE XRAY VIEW(S) OF THE ABDOMEN 9/8/2022 7:35 pm COMPARISON: 09/01/2022 HISTORY: ORDERING SYSTEM PROVIDED HISTORY: evaluate for ileus TECHNOLOGIST PROVIDED HISTORY: Reason for exam:->evaluate for ileus Reason for Exam: evaluate for ileus Additional signs and symptoms: nausea vomiting Relevant Medical/Surgical History: UTI FINDINGS: Lung bases are grossly clear. Dilated gas-filled loops bowel are nonspecific and can represent mild ileus versus partial obstruction. Left-sided double-J ureteral stent partially visualized and evaluated. Hopper catheter identified. Surgical identified within the pelvis noted. There are few scattered pelvic phleboliths. Prominent loops of bowel may represent persistent ileus or partial obstruction. FL LESS THAN 1 HOUR    Result Date: 9/7/2022  Radiology exam is complete. No Radiologist dictation. Please follow up with ordering provider. XR CHEST PORTABLE    Result Date: 9/10/2022  EXAMINATION: ONE XRAY VIEW OF THE CHEST 9/9/2022 10:20 pm COMPARISON: 09/09/2022 at 1018 hours HISTORY: ORDERING SYSTEM PROVIDED HISTORY: Verify left PICC placement TECHNOLOGIST PROVIDED HISTORY: Reason for exam:->Verify left PICC placement Reason for Exam: Verify left PICC placement FINDINGS: Left-sided PICC terminates within the region the confluence of the SVC. The cardiomediastinal silhouette is mildly prominent size. Mild perihilar congestion/interstitial edema. The lungs are clear. No pleural effusion or pneumothorax is present. Satisfactory position of the PICC.   No acute cardiopulmonary process     XR CHEST PORTABLE    Result Date: 9/9/2022  EXAMINATION: ONE XRAY VIEW OF THE CHEST 9/9/2022 10:12 am COMPARISON: 09/06/2022 HISTORY: ORDERING SYSTEM PROVIDED HISTORY: fever TECHNOLOGIST PROVIDED HISTORY: Reason for exam:->fever Reason for Exam: fever FINDINGS: The lungs are without acute focal process. There is no effusion or pneumothorax. The cardiomediastinal silhouette is without acute process. The osseous structures are without acute process. No acute process. XR CHEST PORTABLE    Result Date: 9/6/2022  EXAMINATION: ONE XRAY VIEW OF THE CHEST 9/6/2022 1:57 pm COMPARISON: 08/28/2022 HISTORY: ORDERING SYSTEM PROVIDED HISTORY: cough TECHNOLOGIST PROVIDED HISTORY: Reason for exam:->cough Reason for Exam: cough FINDINGS: The lungs appear clear. There are no pulmonary nodules, masses or infiltrates. There are no pleural effusions and there is no evidence of pneumothorax. The heart and mediastinal structures appear normal.  Bony structures appear unremarkable. No abnormalities noted. XR CHEST PORTABLE    Result Date: 8/28/2022  EXAMINATION: ONE XRAY VIEW OF THE CHEST 8/28/2022 3:37 pm COMPARISON: 04/18/2022 HISTORY: ORDERING SYSTEM PROVIDED HISTORY: emergency TECHNOLOGIST PROVIDED HISTORY: Reason for exam:->emergency Reason for Exam: fatigue, emergency, fever FINDINGS: Increased interstitial opacities seen throughout both lungs. A focal infiltrate is not identified. The cardial pericardial silhouette is unremarkable. No pneumothorax is seen. No free air. No acute bony abnormality. Increased interstitial opacity. While much or all of this may be chronic, please correlate with any clinical evidence of acute interstitial edema. XR CHEST 1 VIEW    Result Date: 9/10/2022  EXAMINATION: ONE XRAY VIEW OF THE CHEST 9/10/2022 12:11 am COMPARISON: Chest radiograph dated September 9, 2022 HISTORY: ORDERING SYSTEM PROVIDED HISTORY: recent CVL insertion TECHNOLOGIST PROVIDED HISTORY: Reason for exam:->recent CVL insertion Reason for Exam: recent CVL insertion FINDINGS: There has been interval placement of a right IJ line with the tip overlying the distal SVC/right atrial region.   The cardiomediastinal silhouette is stable. The previously noted left-sided PICC line has been removed. Bibasilar atelectasis with low lung volumes are noted. There is no significant change. 1. Right IJ line with the tip overlying the distal SVC/right atrial region. 2. Low lung volumes with bibasilar atelectasis. No significant change. US ABDOMEN LIMITED Specify organ? LIVER, GALLBLADDER    Result Date: 9/3/2022  EXAMINATION: RIGHT UPPER QUADRANT ULTRASOUND 9/3/2022 4:43 am COMPARISON: CT 09/01/2022, ultrasound 05/06/2020 HISTORY: ORDERING SYSTEM PROVIDED HISTORY: abdominal pain TECHNOLOGIST PROVIDED HISTORY: Reason for exam:->abdominal pain Specify organ?->LIVER Specify organ?->GALLBLADDER FINDINGS: LIVER:  The liver demonstrates normal echogenicity without evidence of intrahepatic biliary ductal dilatation. BILIARY SYSTEM:  Gallbladder is unremarkable without evidence of pericholecystic fluid, wall thickening or stones. Negative sonographic Lima's sign. Common bile duct is within normal limits measuring 3 mm. RIGHT KIDNEY: The right kidney is grossly unremarkable without evidence of hydronephrosis. PANCREAS:  Visualized portions of the pancreas are unremarkable. OTHER: No evidence of right upper quadrant ascites. Unremarkable right upper quadrant ultrasound. VL DUP UPPER EXTREMITY VENOUS RIGHT    Result Date: 9/9/2022  EXAMINATION: DUPLEX ULTRASOUND OF THE RIGHT UPPER EXTREMITY FOR DVT, 9/9/2022 11:12 am TECHNIQUE: Duplex ultrasound using B-mode/gray scaled imaging and Doppler spectral analysis and color flow was obtained of the deep venous structures of the upper right extremity. COMPARISON: None. HISTORY: ORDERING SYSTEM PROVIDED HISTORY: swollen right upper extremity TECHNOLOGIST PROVIDED HISTORY: Please do the doppler ultrasound of right upper extremity to r/o DVT Reason for exam:->swollen right upper extremity FINDINGS: There is normal flow and compressibility of the visualized venous structures.  There is no evidence of echogenic thrombus. The veins demonstrate good compressibility with normal color flow study and spectral analysis. The exam is suboptimal.  The brachial and basilic veins are not well visualized due to Tegaderm     No evidence of DVT within the right upper extremity. Assessment & Plan:      Abby Mckee is a 78 y.o. male admitted 1/70/6727 for metabolic encephalopathy. 1) Chronic Urinary Retention: managed with suprapubic catheter and exchanged monthly, last exchanged 9/7/22. Recommend SPT exchanges q3 weeks. 2) Chronic Left Hydronephrosis managed with chronic stent: S/p cystoscopy, left ureteral stent exchange, suprapubic tube exchange 9/7/2022  Gross hematuria resolved. Recommend left ureteral stent exchanges q3 months. 3) Sepsis w Complicated UTI:              Repeat urine culture < 10 candida  Repeat blood culture- 2/4 yeast              Prior Blood cultures 4/4 +ESBL E.coli              WBC 8.1, afebrile              On Fetroja and fluconazole, per ID       Recommend irrigate SP w saline 100 ml daily. 4) H/o Prostate Cancer: S/p RPP with Dr. Giovanni Leon in 54. PSA 0.93 2/2021. On Eligard q6 months  5) PAMELLA: Nephrology following    Will follow peripherally. Patient seen and examined, chart reviewed.      Electronically signed by Derrell Stephen PA-C on 9/14/2022 at 7:51 AM

## 2022-09-14 NOTE — PROGRESS NOTES
Progress Note( Dr. Aleja Fatima)  9/14/2022  Subjective:   Admit Date: 8/28/2022  PCP: Chet House MD    Admitted For :Fatigue and possible sepsis from UTI    Consulted For: Control of blood glucose as patient has having hypoglycemic episodes    Interval History: feeling Somewhat  tired  had  no more episode of low blood glucose yesterday  Patient septic with fungemia . in addition to [facility] for RAFFI for deduct source of Infection ? Endocarditis     Pt had Following Urology procedure done early morng hour spf 9/8/2022      Cystoscopy, left ureteral stent exchange,  suprapubic tube exchange. Denies any chest pains,   Denies SOB . Denies nausea or vomiting. now eating better  No new bowel or bladder symptoms. Intake/Output Summary (Last 24 hours) at 9/14/2022 0641  Last data filed at 9/14/2022 0400  Gross per 24 hour   Intake 120 ml   Output 2075 ml   Net -1955 ml         DATA    CBC:   Recent Labs     09/12/22  0545 09/12/22  1400 09/13/22  1248   WBC 7.5  --  7.7   HGB 6.6* 8.3* 9.0*     --  311      CMP:  Recent Labs     09/12/22  0545 09/13/22  1248    141   K 3.4* 4.0    105   CO2 24 24   BUN 38* 37*   CREATININE 3.6* 3.1*   CALCIUM 7.8* 7.8*   PROT 4.7*  --    LABALBU 2.3*  --    BILITOT 0.2  --    ALKPHOS 95  --    AST 23  --    ALT 7*  --        Lipids: No results found for: CHOL, HDL, TRIG  Glucose:  Recent Labs     09/13/22  1618 09/13/22 2031 09/14/22  0232   POCGLU 166* 224* 118*       [id number]:  Lab Results   Component Value Date/Time    LABA1C 7.8 08/29/2022 01:30 AM     High Sensitivity TSH:   Lab Results   Component Value Date/Time    TSHHS 7.590 04/28/2022 06:59 AM     Free T3: No results found for: FT3  Free T4:  Lab Results   Component Value Date/Time    T4FREE 1.15 03/27/2022 08:22 AM       CT ABDOMEN PELVIS WO CONTRAST Additional Contrast? None   Final Result   1.  Probable hematuria left urinary collecting system (possible active   hemorrhage into the left urinary collecting system) and mild-to-moderate left   hydronephrosis. 2. The double-J stent appears in unremarkable position without left ureter   calculus evident. 3. Trace abdominopelvic ascites is probably reactive. 4.  Vascular calcifications are noted reflecting calcific atherosclerosis. 5. Trace left pleural effusion with left basilar relaxation atelectasis or   infiltrate. 6. Minimal subsegmental atelectasis posterior right lung base. 7. Small hiatal hernia. XR CHEST 1 VIEW   Final Result   1. Right IJ line with the tip overlying the distal SVC/right atrial region. 2. Low lung volumes with bibasilar atelectasis. No significant change. XR CHEST PORTABLE   Final Result   Satisfactory position of the PICC. No acute cardiopulmonary process         VL DUP UPPER EXTREMITY VENOUS RIGHT   Final Result   No evidence of DVT within the right upper extremity. XR CHEST PORTABLE   Final Result   No acute process. XR ABDOMEN (KUB) (SINGLE AP VIEW)   Final Result   Prominent loops of bowel may represent persistent ileus or partial   obstruction. FL LESS THAN 1 HOUR   Final Result      XR CHEST PORTABLE   Final Result   No abnormalities noted. US ABDOMEN LIMITED Specify organ? LIVER, GALLBLADDER   Final Result   Unremarkable right upper quadrant ultrasound. CT ABDOMEN PELVIS WO CONTRAST Additional Contrast? None   Final Result   1. Left ureteral stent in place. Mild bilateral urinary tract dilatation   with new right perinephric and periureteral stranding raises concern for   urinary tract infection. No urinary stones. 2.  Suprapubic catheter in the decompressed urinary bladder. 3.  Ill-defined stranding/fluid throughout the retroperitoneum may relate to   vascular congestion or reactive changes. No intraperitoneal free air or   abscess. XR CHEST PORTABLE   Final Result   Increased interstitial opacity.   While much or all of this may be chronic,   please correlate with any clinical evidence of acute interstitial edema. Scheduled Medicines   Medications:    insulin glargine  15 Units SubCUTAneous Nightly    insulin lispro  0-8 Units SubCUTAneous 2 times per day    anidulafungin  100 mg IntraVENous Q24H    insulin lispro  0-8 Units SubCUTAneous TID WC    Cefiderocol Sulfate Tosylate  1,000 mg IntraVENous Q8H    lidocaine PF  5 mL IntraDERmal Once    sodium chloride flush  5-40 mL IntraVENous 2 times per day    sodium chloride flush  5-40 mL IntraVENous 2 times per day    meperidine  12.5 mg IntraVENous Once    ferrous sulfate  325 mg Oral BID WC    carvedilol  6.25 mg Oral BID WC    lidocaine  1 patch TransDERmal Daily    amLODIPine  10 mg Oral Daily    aspirin  81 mg Oral Daily    atorvastatin  80 mg Oral Nightly    levothyroxine  75 mcg Oral Daily    melatonin  10 mg Oral Nightly    pantoprazole  40 mg Oral QAM AC    sodium chloride flush  5-40 mL IntraVENous 2 times per day    [Held by provider] enoxaparin  40 mg SubCUTAneous QPM      Infusions:    sodium chloride      sodium chloride      sodium chloride 25 mL/hr at 09/13/22 0248    sodium chloride      dextrose      sodium chloride 25 mL (09/06/22 2327)    dextrose           Objective:   Vitals: /63   Pulse 67   Temp 98 °F (36.7 °C) (Oral)   Resp 17   Ht 5' 8\" (1.727 m)   Wt 210 lb 15.4 oz (95.7 kg)   SpO2 98%   BMI 32.08 kg/m²   General appearance: alert and cooperative with exam  Neck: no JVD or bruit  Thyroid : Normal lobes   Lungs: Has Vesicular Breath sounds   Heart:  regular rate and rhythm  Abdomen: soft, non-tender; bowel sounds normal; no masses,  no organomegaly has suprapubic catheter   Musculoskeletal: Normal  Extremities: extremities normal, , no edema  Neurologic:  Awake, alert, oriented to name, place and time. Cranial nerves II-XII are grossly intact. Motor weakness. Sensory neuropathy. ,  and gait is abnormal.  Stable    Assessment:

## 2022-09-14 NOTE — PROGRESS NOTES
Plan RAFFI today  Alternates and risk of the procedure were dicussed in detail  Patient is in agreement to proceed  Mallampati is 2  ASA is  3

## 2022-09-14 NOTE — PROGRESS NOTES
V2.0  [facility] Hospitalist Progress Note      Name:  Catarina Meier /Age/Sex: 1940  (78 y.o. male)   MRN & CSN:  [id number] & [id number] Encounter Date/Time: 2022 6:54 PM EDT    Location:  -A PCP: Nikole Shultz MD       Hospital Day: 18    Assessment and Plan:   Catarina Meier is a 78 y.o. male with pmh of prostate cancer, diabetes mellitus, hypertension who admitted with sciatic found out with Complicated UTI (urinary tract infection)    # Candida albicans fungemia  #Candida UTI  -Transthoracic echo inconclusive  - Spoke to ophthalmology, recommended checking visual acuity, per my evaluation, patient's vision is 20/20 bilaterally (with glasses on)  -No growth on catheter tip culture for 36-48h  -Repeat Bcx with no growth at 24h    Plan:  Cardiology consulted for RAFFI  MRSA nares  ID following, appreciate recs  Continue Eraxis (started )  Follow-up on central line tip culture and repeat blood cultures     # ESBL bacteremia in the setting of UTI  #Complicated UTI  - Urine culture on  growing E. coli resistant to Cipro and cefepime  - Urine culture on  no significant growth  - CT abdomen/pelvis showing left ureteral stent in place, mild bilateral urinary tract dilatation with new right perinephric and periureteral stranding raises concern for UTI    Plan:  Continue cefiderocol  ID following, appreciated recs     #Chronic urinary retention managed with suprapubic catheter and exchanged monthly, last exchange  with a cystoscopy patient had hematuria and patient became lethargic and drowsy with elevated leukocytes discussed with ID started on vancomycin and fluconazole and  obtaining blood cultures and urine cultures urine and blood cultures came back positive for Candida albicans treated as above  # Hematuria s/p cystoscopy and exchange of stent  -Urology now following peripherally, recommended to manually irrigate bladder every 4 hours and as needed clots         #Patient with RUE swelling-Improving  -ultrasound RUE negative for DVT Likely due to volume overload in the setting of PAMELLA    Chronic medical problems:      # Normocytic anemia   - Continue with H&H  - Transfuse if hemoglobin less than 7     # History of prostate cancer s/p prostatectomy 1996    #Hypertension  Plan: continue home meds     # Hypothyroidism   Plan: continue levothyroxine     # Type 2 diabetes  Continue SSI, hypoglycemic protocol diabetic diet      Diet ADULT DIET; Regular; 4 carb choices (60 gm/meal)  ADULT ORAL NUTRITION SUPPLEMENT; Breakfast, Dinner; Diabetic Oral Supplement   DVT Prophylaxis [x] Lovenox, []  Heparin, [] SCDs, [] Ambulation,  [] Eliquis, [] Xarelto  [] Coumadin   Code Status Full Code   Disposition From: Home  Expected Disposition: Home versus SNF  Estimated Date of Discharge: Greater than 2 days  Patient requires continued admission due to candidemia, rule out IE, UTI   Surrogate Decision Maker/ POA Kamala Andres Archie Arrant. Subjective:     Chief Complaint: Fatigue (General weakness started this am. Family called because pt had changed from baseline. Currently being tx for UTI. Pt is slightly confused, doesn't know the year. He has been N/V. )     Patient states he doesn't feel too good. He denies any fever or chills but not able to articulate why he's not feeling well. Review of Systems:    General: No fever, no chills  Eyes: No blurry vision  Heart: No chest pain, no palpitations  Lungs: No shortness of breath, no cough  Abdomen: No abdominal pain, no nausea, no vomiting, no constipation, no diarrhea  : No frequency, no dysuria, no urgency, no decreased urination  MSK: No lower extremity swelling  Neuro: No confusion, no weakness  Skin: No rashes    Objective:      Intake/Output Summary (Last 24 hours) at 9/14/2022 1533  Last data filed at 9/14/2022 0400  Gross per 24 hour   Intake 60 ml   Output 1250 ml   Net -1190 ml        Vitals:   Vitals:    09/14/22 1322   BP: (!) 159/65   Pulse:    Resp:    Temp:    SpO2:        Physical Exam:     General: NAD, AAO x3-4  Eyes: EOMI, no scleral icterus, no conjunctival pallor  HENT: Atraumatic, no lymphadenopathy, neck supple  Resp: no respiratory distress  GI: Normal bowel sounds, soft, nondistended, nontender  MSK: Normal ROM, no lower extremity edema, bilateral hand swelling  Skin: Intact, dry, warm, no rashes  Neuro: CN II to XII grossly intact  Psych: Normal mood    Medications:   Medications:    insulin glargine  15 Units SubCUTAneous Nightly    insulin lispro  0-8 Units SubCUTAneous 2 times per day    anidulafungin  100 mg IntraVENous Q24H    insulin lispro  0-8 Units SubCUTAneous TID WC    Cefiderocol Sulfate Tosylate  1,000 mg IntraVENous Q8H    lidocaine PF  5 mL IntraDERmal Once    sodium chloride flush  5-40 mL IntraVENous 2 times per day    sodium chloride flush  5-40 mL IntraVENous 2 times per day    meperidine  12.5 mg IntraVENous Once    ferrous sulfate  325 mg Oral BID WC    carvedilol  6.25 mg Oral BID WC    lidocaine  1 patch TransDERmal Daily    amLODIPine  10 mg Oral Daily    aspirin  81 mg Oral Daily    atorvastatin  80 mg Oral Nightly    levothyroxine  75 mcg Oral Daily    melatonin  10 mg Oral Nightly    pantoprazole  40 mg Oral QAM AC    sodium chloride flush  5-40 mL IntraVENous 2 times per day    [Held by provider] enoxaparin  40 mg SubCUTAneous QPM      Infusions:    sodium chloride      sodium chloride      sodium chloride 25 mL/hr at 09/13/22 0248    sodium chloride      dextrose      sodium chloride 25 mL (09/06/22 2327)    dextrose       PRN Meds: sodium chloride, , PRN  bisacodyl, 10 mg, Daily PRN  docusate sodium, 100 mg, BID PRN  polyethylene glycol, 17 g, Daily PRN  sodium chloride, , PRN  sodium chloride flush, 5-40 mL, PRN  sodium chloride, , PRN  sodium chloride flush, 5-40 mL, PRN  sodium chloride, 25 mL, PRN  HYDROmorphone, 0.25 mg, Q5 Min PRN  fentanNYL, 50 mcg, Q5 Min PRN  glucose, 4 tablet, PRN  dextrose bolus, 125 mL, PRN   Or  dextrose bolus, 250 mL, PRN  glucagon (rDNA), 1 mg, PRN  dextrose, , Continuous PRN  guaiFENesin-dextromethorphan, 5 mL, Q4H PRN  magnesium hydroxide, 30 mL, Daily PRN  sodium chloride flush, 5-40 mL, PRN  sodium chloride, , PRN  ondansetron, 4 mg, Q8H PRN   Or  ondansetron, 4 mg, Q6H PRN  aluminum & magnesium hydroxide-simethicone, 30 mL, Q6H PRN  acetaminophen, 650 mg, Q6H PRN   Or  acetaminophen, 650 mg, Q6H PRN  glucose, 4 tablet, PRN  dextrose bolus, 125 mL, PRN   Or  dextrose bolus, 250 mL, PRN  glucagon (rDNA), 1 mg, PRN  dextrose, , Continuous PRN  ipratropium-albuterol, 1 vial, BID PRN      Labs      Recent Results (from the past 24 hour(s))   POCT Glucose    Collection Time: 09/13/22  4:18 PM   Result Value Ref Range    POC Glucose 166 (H) 70 - 99 MG/DL   POCT Glucose    Collection Time: 09/13/22  8:31 PM   Result Value Ref Range    POC Glucose 224 (H) 70 - 99 MG/DL   POCT Glucose    Collection Time: 09/14/22  2:32 AM   Result Value Ref Range    POC Glucose 118 (H) 70 - 99 MG/DL   CBC with Auto Differential    Collection Time: 09/14/22  5:54 AM   Result Value Ref Range    WBC 8.1 4.0 - 10.5 K/CU MM    RBC 2.73 (L) 4.6 - 6.2 M/CU MM    Hemoglobin 8.2 (L) 13.5 - 18.0 GM/DL    Hematocrit 25.4 (L) 42 - 52 %    MCV 93.0 78 - 100 FL    MCH 30.0 27 - 31 PG    MCHC 32.3 32.0 - 36.0 %    RDW 15.3 (H) 11.7 - 14.9 %    Platelets 391 432 - 559 K/CU MM    MPV 10.1 7.5 - 11.1 FL    Differential Type AUTOMATED DIFFERENTIAL     Segs Relative 65.4 36 - 66 %    Lymphocytes % 18.5 (L) 24 - 44 %    Monocytes % 8.3 (H) 0 - 4 %    Eosinophils % 6.6 (H) 0 - 3 %    Basophils % 0.6 0 - 1 %    Segs Absolute 5.3 K/CU MM    Lymphocytes Absolute 1.5 K/CU MM    Monocytes Absolute 0.7 K/CU MM    Eosinophils Absolute 0.5 K/CU MM    Basophils Absolute 0.1 K/CU MM    Nucleated RBC % 0.0 %    Total Nucleated RBC 0.0 K/CU MM    Total Immature Neutrophil 0.05 K/CU MM    Immature Neutrophil % 0.6 (H) 0 - 0.43 %   Comprehensive Metabolic Panel    Collection Time: 09/14/22  5:54 AM   Result Value Ref Range    Sodium 140 135 - 145 MMOL/L    Potassium 3.6 3.5 - 5.1 MMOL/L    Chloride 104 99 - 110 mMol/L    CO2 25 21 - 32 MMOL/L    BUN 36 (H) 6 - 23 MG/DL    Creatinine 3.0 (H) 0.9 - 1.3 MG/DL    Glucose 108 (H) 70 - 99 MG/DL    Calcium 7.9 (L) 8.3 - 10.6 MG/DL    Albumin 2.4 (L) 3.4 - 5.0 GM/DL    Total Protein 5.0 (L) 6.4 - 8.2 GM/DL    Total Bilirubin 0.2 0.0 - 1.0 MG/DL    ALT 7 (L) 10 - 40 U/L    AST 19 15 - 37 IU/L    Alkaline Phosphatase 93 40 - 128 IU/L    GFR Non-African American 20 (L) >60 mL/min/1.73m2    GFR  24 (L) >60 mL/min/1.73m2    Anion Gap 11 4 - 16   Brain Natriuretic Peptide    Collection Time: 09/14/22  5:54 AM   Result Value Ref Range    Pro-BNP 6,587 (H) <300 PG/ML   POCT Glucose    Collection Time: 09/14/22 12:08 PM   Result Value Ref Range    POC Glucose 90 70 - 99 MG/DL        Imaging/Diagnostics Last 24 Hours   Echocardiogram complete 2D with doppler with color    Result Date: 9/12/2022  Transthoracic Echocardiography Report (TTE)  Demographics   Patient Name      Ashu MARCIAL     Date of Study       09/12/2022   Date of Birth     1940         Gender              Male   Age               78 year(s)         Race                Black   Patient Number    [id number]         Room Number         2006   Visit Number      [id number]   Corporate ID      [id number]   Accession Number  [id number]         Sonographer         [facility], [place] [id number] [place]  Physician         NORA APRN-JOHNNY         Physician           MD  Procedure Type of Study   TTE procedure:ECHOCARDIOGRAM COMPLETE 2D W DOPPLER W COLOR. Procedure Date Date: 09/12/2022 Start: 02:23 PM Study Location: Portable Technical Quality: Adequate visualization Indications:Endocarditis.  Patient Status: Routine Height: 68 inches Weight: 210 pounds BSA: 2.09 m2 BMI: 31.93 kg/m2 HR: 68 bpm BP: 141/57 mmHg  Conclusions   Summary  Left ventricular systolic function is normal.  Ejection fraction is visually estimated at 60%. Grade II diastolic dysfunction. Cannot rule out endocarditis on the tricuspid valve. No evidence of any pericardial effusion. Signature   ------------------------------------------------------------------  Electronically signed by Dominik Brand MD (Interpreting  physician) on 09/12/2022 at 06:43 PM  ------------------------------------------------------------------   Findings   Left Ventricle  Left ventricular systolic function is normal.  Ejection fraction is visually estimated at 60%. No regional wall motion abnormalites. Grade II diastolic dysfunction. Left ventricle size is normal.   Left Atrium  Essentially normal left atrium. Right Atrium  Essentially normal right atrium. Right Ventricle  Essentially normal right ventricle. Aortic Valve  Sclerotic, but non-stenotic aortic valve. No significant valvular disease noted. Mitral Valve  Structurally normal mitral valve. Mild mitral regurgitation. Tricuspid Valve  Tricuspid valve is structurally normal.  Mild tricuspid regurgitation. Cannot rule out endocarditis on the tricuspid valve. Pulmonic Valve  Pulmonic valve is structurally normal.  No significant valvular disease noted. Pericardial Effusion  No evidence of any pericardial effusion. Pleural Effusion  No evidence of pleural effusion. Miscellaneous  IVC and abdominal aorta are within normal limits.   M-Mode/2D Measurements & Calculations   LV Diastolic Dimension:  LV Systolic Dimension:  LA Dimension: 3.7 cmAO Root  3.68 cm                  2.44 cm                 Dimension: 3.3 cmLA Area:  LV FS:33.7 %             LV Volume Diastolic: 62 78.6 cm2  LV PW Diastolic: 1.3 cm  ml  LV PW Systolic: 6.77 cm  LV Volume Systolic: 26  Septum Diastolic: 7.19   ml cm                       LV EDV/LV EDV Index: 62  Septum Systolic: 8.01 cm EI/66 L9EO ESV/LV ESV   LA/Aorta: 1.12  CO: 4.47 l/min           Index: 26 ml/12 m2      Ascending Aorta: 3.4 cm  CI: 2.14 l/m*m2          EF Calculated (A4C):    LA volume/Index: 27 ml                           58.1 %                  /26T9  LV Area Diastolic: 24    EF Calculated (2D):  cm2                      63.4 %  LV Area Systolic: 20.4  cm2                      LV Length: 7.77 cm                            LVOT: 2.1 cm  Doppler Measurements & Calculations   MV Peak E-Wave: 110     AV Peak Velocity: 150 cm/s  LVOT Peak Velocity: 88  cm/s                    AV Peak Gradient: 9 mmHg    cm/s  MV Peak A-Wave: 89.3    AV Mean Velocity: 107 cm/s  LVOT Mean Velocity: 65  cm/s                    AV Mean Gradient: 5 mmHg    cm/s  MV E/A Ratio: 1.23      AV VTI: 31.9 cm             LVOT Peak Gradient: 3  MV Peak Gradient: 4.84  AV Area (Continuity):2.06   mmHgLVOT Mean Gradient:  mmHg                    cm2                         2 mmHg  MV Mean Gradient: 4                                 Estimated RVSP: 35 mmHg  mmHg                    LVOT VTI: 19 cm             Estimated RAP:3 mmHg  MV Mean Velocity: 90.2  cm/s                    Estimated PASP: 35.49 mmHg  MV Deceleration Time:                               TR Velocity:285 cm/s  203 msec                                            TR Gradient:32.49 mmHg   MV Area (continuity):  1.67 cm2  MV E' Septal Velocity:  8.33 cm/s  MV A' Septal Velocity:  7.13 cm/s  MV E' Lateral Velocity:  8.11 cm/s  MV A' Lateral Velocity:  9.32 cm/s  MV E/E' septal: 13.21  MV E/E' lateral: 13.56        Electronically signed by Black Villalobos MD on 9/14/2022 at 3:33 PM

## 2022-09-14 NOTE — PROGRESS NOTES
Cardiology Progress Note     Admit Date:  8/28/2022    Consult reason/ Seen today for :       Subjective and  Overnight Events :  RAFFI today showed no significant abnormality       Chief complain on admission : 78 y. o.year old who is admitted for  Chief Complaint   Patient presents with    Fatigue     General weakness started this am. Family called because pt had changed from baseline. Currently being tx for UTI. Pt is slightly confused, doesn't know the year. He has been N/V. Assessment / Plan:  RAFFI Did not show any significant abnormality  Candida fungemia as per primary team  Hypertension continue amlodipine 5 mg daily and carvedilol 6.25 twice daily no episodes of SVT so far  DVT prophylaxis if no contraindication  6. Dyslipidemia: continue statins    will sign off    Past medical history:    has a past medical history of Acute urinary tract infection, Ataxia, Diabetes mellitus ([facility] [address] Fusion of spine of cervical region, Gait disturbance, History of prostate cancer, History of tobacco use, Hyperlipidemia, and Osteoarthritis. Past surgical history:   has a past surgical history that includes Prostate surgery; other surgical history (3/28/13); Colon surgery; Bladder surgery; Prostatectomy (1996); Bladder surgery (Left, 3/17/2022); and Cystoscopy (Left, 9/7/2022). Social History:   reports that he quit smoking about 46 years ago. His smoking use included cigarettes. He smoked an average of .5 packs per day. He has never used smokeless tobacco. He reports that he does not drink alcohol and does not use drugs. Family history:  family history includes Cancer in his brother, maternal uncle, and mother; Diabetes in his brother, father, maternal grandmother, and sister; Heart Disease in his father; High Blood Pressure in his father and sister; Stroke in his maternal grandfather.     No Known Allergies    Review of Systems:    All 14 systems were reviewed and are negative  Except for the positive findings  which as documented     BP (!) 159/65   Pulse 68   Temp 97.8 °F (36.6 °C) (Oral)   Resp 12   Ht 5' 8\" (1.727 m)   Wt 210 lb 15.4 oz (95.7 kg)   SpO2 100%   BMI 32.08 kg/m²     Intake/Output Summary (Last 24 hours) at 9/14/2022 1710  Last data filed at 9/14/2022 0400  Gross per 24 hour   Intake 60 ml   Output 1250 ml   Net -1190 ml     Physical Exam:  Constitutional:  Well developed, Well nourished, No acute distress, Non-toxic appearance. HENT:  Normocephalic, Atraumatic, Bilateral external ears normal, Oropharynx moist, No oral exudates, Nose normal. Neck- Normal range of motion, No tenderness, Supple, No stridor. Eyes:  PERRL, EOMI, Conjunctiva normal, No discharge. Respiratory:  Normal breath sounds, No respiratory distress, No wheezing, No chest tenderness. Cardiovascular:  Normal heart rate, Normal rhythm, No murmurs, No rubs, No gallops, JVP not elevated  Abdomen/GI:  Bowel sounds normal, Soft, No tenderness, No masses, No pulsatile masses. Musculoskeletal:  Intact distal pulses, No edema, No tenderness, No cyanosis, No clubbing. Good range of motion in all major joints. No tenderness to palpation or major deformities noted. Back- No tenderness. Integument:  Warm, Dry, No erythema, No rash. Lymphatic:  No lymphadenopathy noted. Neurologic:  Alert & oriented x 3, Normal motor function, Normal sensory function, No focal deficits noted.    Psychiatric:  Affect  and  Mood :no change    Medications:    insulin glargine  15 Units SubCUTAneous Nightly    insulin lispro  0-8 Units SubCUTAneous 2 times per day    anidulafungin  100 mg IntraVENous Q24H    insulin lispro  0-8 Units SubCUTAneous TID WC    Cefiderocol Sulfate Tosylate  1,000 mg IntraVENous Q8H    lidocaine PF  5 mL IntraDERmal Once    sodium chloride flush  5-40 mL IntraVENous 2 times per day    sodium chloride flush  5-40 mL IntraVENous 2 times per day meperidine  12.5 mg IntraVENous Once    ferrous sulfate  325 mg Oral BID WC    carvedilol  6.25 mg Oral BID     lidocaine  1 patch TransDERmal Daily    amLODIPine  10 mg Oral Daily    aspirin  81 mg Oral Daily    atorvastatin  80 mg Oral Nightly    levothyroxine  75 mcg Oral Daily    melatonin  10 mg Oral Nightly    pantoprazole  40 mg Oral QAM AC    sodium chloride flush  5-40 mL IntraVENous 2 times per day    [Held by provider] enoxaparin  40 mg SubCUTAneous QPM      sodium chloride      sodium chloride      sodium chloride 25 mL/hr at 09/13/22 0248    sodium chloride      dextrose      sodium chloride 25 mL (09/06/22 2327)    dextrose       sodium chloride, bisacodyl, docusate sodium, polyethylene glycol, sodium chloride, sodium chloride flush, sodium chloride, sodium chloride flush, sodium chloride, HYDROmorphone, fentanNYL, glucose, dextrose bolus **OR** dextrose bolus, glucagon (rDNA), dextrose, guaiFENesin-dextromethorphan, magnesium hydroxide, sodium chloride flush, sodium chloride, ondansetron **OR** ondansetron, aluminum & magnesium hydroxide-simethicone, acetaminophen **OR** acetaminophen, glucose, dextrose bolus **OR** dextrose bolus, glucagon (rDNA), dextrose, ipratropium-albuterol    Lab Data:  CBC:   Recent Labs     09/12/22  0545 09/12/22  1400 09/13/22  1248 09/14/22  0554   WBC 7.5  --  7.7 8.1   HGB 6.6* 8.3* 9.0* 8.2*   HCT 20.7* 25.9* 28.0* 25.4*   MCV 92.8  --  93.6 93.0     --  311 296     BMP:   Recent Labs     09/12/22  0545 09/13/22  1248 09/14/22  0554    141 140   K 3.4* 4.0 3.6    105 104   CO2 24 24 25   PHOS  --  3.1  --    BUN 38* 37* 36*   CREATININE 3.6* 3.1* 3.0*     PT/INR:   Recent Labs     09/12/22  1324   PROTIME 14.2   INR 1.10     BNP:    Recent Labs     09/14/22  0554   PROBNP 6,587*     TROPONIN: No results for input(s): TROPONINT in the last 72 hours. ECHO :   echocardiogram    Assessment:  78 y. o.year old who is admitted for  Chief Complaint Patient presents with    Fatigue     General weakness started this am. Family called because pt had changed from baseline. Currently being tx for UTI. Pt is slightly confused, doesn't know the year. He has been N/V.     , active issues as noted below:  Impression:  Principal Problem:    Complicated UTI (urinary tract infection)  Active Problems:    Anemia of chronic disorder    Fungemia    Chronic kidney disease, stage 3a ([facility])    SVT (supraventricular tachycardia) ([facility])  Resolved Problems:    * No resolved hospital problems. *            All labs, medications and tests reviewed by myself , continue all other medications of all above medical condition listed as is except for changes mentioned above. Thank you very much for consult , please call with questions.     Kale Pedro MD, MD 9/14/2022 5:10 PM

## 2022-09-14 NOTE — PROGRESS NOTES
Nephrology Progress Note  9/14/2022 10:18 AM        Subjective:   Admit Date: 8/28/2022  PCP: Orin Valdes MD    Interval History: Continue to improve slowly but surely    Diet: Reasonable    ROS: No shortness of breath or confusion  No fever and acceptable blood pressure    Urine output little more than 2L/day for the last 24 hours    Data:     Current meds:    insulin glargine  15 Units SubCUTAneous Nightly    insulin lispro  0-8 Units SubCUTAneous 2 times per day    anidulafungin  100 mg IntraVENous Q24H    insulin lispro  0-8 Units SubCUTAneous TID WC    Cefiderocol Sulfate Tosylate  1,000 mg IntraVENous Q8H    lidocaine PF  5 mL IntraDERmal Once    sodium chloride flush  5-40 mL IntraVENous 2 times per day    sodium chloride flush  5-40 mL IntraVENous 2 times per day    meperidine  12.5 mg IntraVENous Once    ferrous sulfate  325 mg Oral BID WC    carvedilol  6.25 mg Oral BID WC    lidocaine  1 patch TransDERmal Daily    amLODIPine  10 mg Oral Daily    aspirin  81 mg Oral Daily    atorvastatin  80 mg Oral Nightly    levothyroxine  75 mcg Oral Daily    melatonin  10 mg Oral Nightly    pantoprazole  40 mg Oral QAM AC    sodium chloride flush  5-40 mL IntraVENous 2 times per day    [Held by provider] enoxaparin  40 mg SubCUTAneous QPM      sodium chloride      sodium chloride      sodium chloride 25 mL/hr at 09/13/22 0248    sodium chloride      dextrose      sodium chloride 25 mL (09/06/22 2327)    dextrose           I/O last 3 completed shifts:   In: 180 [Other:180]  Out: 2075 [Urine:2075]    CBC:   Recent Labs     09/12/22  0545 09/12/22  1400 09/13/22  1248 09/14/22  0554   WBC 7.5  --  7.7 8.1   HGB 6.6* 8.3* 9.0* 8.2*     --  311 296          Recent Labs     09/12/22  0545 09/13/22  1248 09/14/22  0554    141 140   K 3.4* 4.0 3.6    105 104   CO2 24 24 25   BUN 38* 37* 36*   CREATININE 3.6* 3.1* 3.0*   GLUCOSE 183* 190* 108*       Lab Results   Component Value Date    CALCIUM 7.9 (L) 09/14/2022    PHOS 3.1 09/13/2022       Objective:     Vitals: BP (!) 144/56   Pulse 67   Temp 97.8 °F (36.6 °C) (Oral)   Resp 17   Ht 5' 8\" (1.727 m)   Wt 210 lb 15.4 oz (95.7 kg)   SpO2 97%   BMI 32.08 kg/m² ,    General appearance: Alert, awake and oriented  HEENT: 2+ conjunctival pallor  Neck: Supple  Lungs: No gross crackles on auscultation  Heart: Regular rate and rhythm  Abdomen: Soft, suprapubic catheter   Extremities: Trace edema      Problem List :         Impression :     Acute kidney injury with underlying CKD stage III-excellent urine output-fortunately BUN/creatinine is down going-likely recovering from tubular injury  Sepsis from genitourinary infection with indwelling chronic stent, which has been exchanged  Multiple chronic disease, which include, but not limited to, diabetes, prostate cancer  Multifactorial anemia  Probable fluid overload with high proBNP level    Recommendation/Plan  :     States she has no overt shortness of breath or pulmonary symptoms, and making good urine, will watch clinically-if necessary then we will add IV loop  Watch for iatrogenic and nosocomial complication  Good glycemic control  Follow clinically and biochemically      Donald Krishna MD MD

## 2022-09-14 NOTE — CONSULTS
Consult for [facility] possibly occluded, troubleshoot line. Small bend noted under occlusive dressing, new dressing applied with BioPatch and securing device. EDC is patent. Infusion restarted, no issues with continuous infusion at this time. Primary RN Reyes Pock updated. Please consult IV/PICC team for questions, concerns, or patient's needs change.

## 2022-09-14 NOTE — PROGRESS NOTES
Irrigated SPC with 50 cc sterile saline, noticed that saline flushed out through pts penis, notified Uro PA, Uro PA stated that is normal finding for pt, pt has small bladder and bladder spasms.

## 2022-09-14 NOTE — PROGRESS NOTES
Infectious Disease Progress Note  2022   Patient Name: Claudeen Bare : 1940     Assessment  Sepsis secondary to ESBL E. coli pyelonephritis associated with suprapubic cystostomy. Candida albicans Fungemia Source Unclear,  Possible Endocarditis:     MRSA Screen Positive:    History of prostate cancer status post RPP. ESBL E coli bacteremia. S/p cystoscopy, left ureteral stent exchange and peritoneal mccrary catheter exchange on 2022. Fever has worsened, CRP and pct is elevated. raises question of persistent ESBL bacteremia, CRE, fungal UTI and pneumonia. Hematuria: post procedure  ?ileus  CKD stage IIIB  T2DM  Diabetic neuropathy     Plan  Therapeutic: -d/c meropenem 1 g q 12 hours . Continue IV Fetroja 1 gm q8h x 14 days (end date 22)    Continue IV vancomycin per pharmacy dosing x 14 days (end date 22)  Continue IV Eraxis 100 mg q24h for 2 weeks from negative BC at 48h, duration will be dependent upon results of RAFFI (2-6 weeks)  Diagnostic: MRSA nares positive, await repeat [facility]   Recommend consult with ophthalmology to evaluate for possible endophthalmitis   Await RAFFI from     Reason for visit: F/u ESBL E coli bacteremia and left pyelonephritis? History:? Interval history noted  Denies n/v/d/f or untoward effects of antimicrobials. States has generalized pain in all 4 extremities. Son at bedside. Physical Exam:  Vital Signs: BP (!) 144/56   Pulse 67   Temp 97.8 °F (36.6 °C) (Oral)   Resp 17   Ht 5' 8\" (1.727 m)   Wt 210 lb 15.4 oz (95.7 kg)   SpO2 97%   BMI 32.08 kg/m²     Gen: A&O x 4, resting in bed. HEMT: AT/NC Oropharynx pink, moist, and without lesions or exudates; dentition in good state of repair  Eyes: PERRLA, EOMI, conjunctiva pink, sclera anicteric. Neck: Supple. Trachea midline. No LAD. Chest: no distress and CTA. Good air movement. Heart: RRR and no MRG. Abd: suprapubic catheter, soft, gaseous distension, no tenderness, no hepatomegaly. Normoactive bowel sounds. Ext: no clubbing, cyanosis, or edema  Catheter Site: without erythema or tenderness  Neuro: Mental status intact. CN 2-12 intact and no focal sensory or motor deficits     Radiologic / Imaging / TESTING  9/10/22 XR Chest:  Impression   1. Right IJ line with the tip overlying the distal SVC/right atrial region. 2. Low lung volumes with bibasilar atelectasis. No significant change. 9/10/22 CT Abdomen Pelvis WO Contrast:  Impression   1. Probable hematuria left urinary collecting system (possible active   hemorrhage into the left urinary collecting system) and mild-to-moderate left   hydronephrosis. 2. The double-J stent appears in unremarkable position without left ureter   calculus evident. 3. Trace abdominopelvic ascites is probably reactive. 4.  Vascular calcifications are noted reflecting calcific atherosclerosis. 5. Trace left pleural effusion with left basilar relaxation atelectasis or   infiltrate. 6. Minimal subsegmental atelectasis posterior right lung base. 7. Small hiatal hernia. 9/12/22 Echo Complete:   Summary   Left ventricular systolic function is normal.   Ejection fraction is visually estimated at 60%. Grade II diastolic dysfunction. Cannot rule out endocarditis on the tricuspid valve. No evidence of any pericardial effusion.     Labs:    Recent Results (from the past 24 hour(s))   Basic Metabolic Panel    Collection Time: 09/13/22 12:48 PM   Result Value Ref Range    Sodium 141 135 - 145 MMOL/L    Potassium 4.0 3.5 - 5.1 MMOL/L    Chloride 105 99 - 110 mMol/L    CO2 24 21 - 32 MMOL/L    Anion Gap 12 4 - 16    BUN 37 (H) 6 - 23 MG/DL    Creatinine 3.1 (H) 0.9 - 1.3 MG/DL    Glucose 190 (H) 70 - 99 MG/DL    Calcium 7.8 (L) 8.3 - 10.6 MG/DL    GFR Non- 19 (L) >60 mL/min/1.73m2    GFR  23 (L) >60 mL/min/1.73m2   CBC with Auto Differential    Collection Time: 09/13/22 12:48 PM   Result Value Ref Range    WBC 7.7 4.0 - 10.5 K/CU MM    RBC 2.99 (L) 4.6 - 6.2 M/CU MM    Hemoglobin 9.0 (L) 13.5 - 18.0 GM/DL    Hematocrit 28.0 (L) 42 - 52 %    MCV 93.6 78 - 100 FL    MCH 30.1 27 - 31 PG    MCHC 32.1 32.0 - 36.0 %    RDW 15.3 (H) 11.7 - 14.9 %    Platelets 804 814 - 895 K/CU MM    MPV 9.9 7.5 - 11.1 FL    Differential Type AUTOMATED DIFFERENTIAL     Segs Relative 65.7 36 - 66 %    Lymphocytes % 18.9 (L) 24 - 44 %    Monocytes % 7.8 (H) 0 - 4 %    Eosinophils % 6.4 (H) 0 - 3 %    Basophils % 0.7 0 - 1 %    Segs Absolute 5.1 K/CU MM    Lymphocytes Absolute 1.5 K/CU MM    Monocytes Absolute 0.6 K/CU MM    Eosinophils Absolute 0.5 K/CU MM    Basophils Absolute 0.1 K/CU MM    Nucleated RBC % 0.0 %    Total Nucleated RBC 0.0 K/CU MM    Total Immature Neutrophil 0.04 K/CU MM    Immature Neutrophil % 0.5 (H) 0 - 0.43 %   Magnesium    Collection Time: 09/13/22 12:48 PM   Result Value Ref Range    Magnesium 2.4 1.8 - 2.4 mg/dl   Phosphorus    Collection Time: 09/13/22 12:48 PM   Result Value Ref Range    Phosphorus 3.1 2.5 - 4.9 MG/DL   POCT Glucose    Collection Time: 09/13/22  4:18 PM   Result Value Ref Range    POC Glucose 166 (H) 70 - 99 MG/DL   POCT Glucose    Collection Time: 09/13/22  8:31 PM   Result Value Ref Range    POC Glucose 224 (H) 70 - 99 MG/DL   POCT Glucose    Collection Time: 09/14/22  2:32 AM   Result Value Ref Range    POC Glucose 118 (H) 70 - 99 MG/DL   CBC with Auto Differential    Collection Time: 09/14/22  5:54 AM   Result Value Ref Range    WBC 8.1 4.0 - 10.5 K/CU MM    RBC 2.73 (L) 4.6 - 6.2 M/CU MM    Hemoglobin 8.2 (L) 13.5 - 18.0 GM/DL    Hematocrit 25.4 (L) 42 - 52 %    MCV 93.0 78 - 100 FL    MCH 30.0 27 - 31 PG    MCHC 32.3 32.0 - 36.0 %    RDW 15.3 (H) 11.7 - 14.9 %    Platelets 321 468 - 443 K/CU MM    MPV 10.1 7.5 - 11.1 FL    Differential Type AUTOMATED DIFFERENTIAL     Segs Relative 65.4 36 - 66 %    Lymphocytes % 18.5 (L) 24 - 44 %    Monocytes % 8.3 (H) 0 - 4 %    Eosinophils % 6.6 (H) 0 - 3 % Basophils % 0.6 0 - 1 %    Segs Absolute 5.3 K/CU MM    Lymphocytes Absolute 1.5 K/CU MM    Monocytes Absolute 0.7 K/CU MM    Eosinophils Absolute 0.5 K/CU MM    Basophils Absolute 0.1 K/CU MM    Nucleated RBC % 0.0 %    Total Nucleated RBC 0.0 K/CU MM    Total Immature Neutrophil 0.05 K/CU MM    Immature Neutrophil % 0.6 (H) 0 - 0.43 %   Comprehensive Metabolic Panel    Collection Time: 09/14/22  5:54 AM   Result Value Ref Range    Sodium 140 135 - 145 MMOL/L    Potassium 3.6 3.5 - 5.1 MMOL/L    Chloride 104 99 - 110 mMol/L    CO2 25 21 - 32 MMOL/L    BUN 36 (H) 6 - 23 MG/DL    Creatinine 3.0 (H) 0.9 - 1.3 MG/DL    Glucose 108 (H) 70 - 99 MG/DL    Calcium 7.9 (L) 8.3 - 10.6 MG/DL    Albumin 2.4 (L) 3.4 - 5.0 GM/DL    Total Protein 5.0 (L) 6.4 - 8.2 GM/DL    Total Bilirubin 0.2 0.0 - 1.0 MG/DL    ALT 7 (L) 10 - 40 U/L    AST 19 15 - 37 IU/L    Alkaline Phosphatase 93 40 - 128 IU/L    GFR Non-African American 20 (L) >60 mL/min/1.73m2    GFR  24 (L) >60 mL/min/1.73m2    Anion Gap 11 4 - 16   Brain Natriuretic Peptide    Collection Time: 09/14/22  5:54 AM   Result Value Ref Range    Pro-BNP 6,587 (H) <300 PG/ML     CULTURE results: Invalid input(s): BLOOD CULTURE,  URINE CULTURE, SURGICAL CULTURE    Diagnosis:  Patient Active Problem List   Diagnosis    Gait disturbance    Generalized weakness    Diabetes mellitus (HCC)    Osteoarthritis    Anemia of chronic disorder    Infected implanted bladder sphincter cuff    Escherichia coli urinary tract infection    Hypertension    Sepsis ([facility] [address]    Acute encephalopathy    Type 2 diabetes mellitus with hypoglycemia without coma (HCC)    UTI (urinary tract infection) due to urinary indwelling catheter (HCC)    Hyperkalemia    Acute kidney failure (HCC)    Leg weakness, bilateral    Type 2 diabetes mellitus with neurological manifestations, uncontrolled (HCC)    Complicated UTI (urinary tract infection)    Essential hypertension    Severe muscle deconditioning 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 (HCC)    Frequent falls    Chronic kidney disease, stage 3a ([address]    Uncontrolled type 2 diabetes mellitus with peripheral neuropathy (HCC)    Prostate cancer ([facility] [address]    Suprapubic catheter ([facility] [address]    Sepsis due to urinary tract infection ([facility] [address]    Coagulase negative Staphylococcus bacteremia    Chronic kidney disease, stage IV (severe) (HCC)    Acute metabolic encephalopathy    SVT (supraventricular tachycardia) (HCC)    Metabolic encephalopathy    History of prostate cancer    Cigarette nicotine dependence without complication    Altered mental status    Serratia marcescens infection    Hypoglycemia    Fungemia       Active Problems  Principal Problem:    Complicated UTI (urinary tract infection)  Active Problems:    Anemia of chronic disorder    Fungemia    Chronic kidney disease, stage 3a (HCC)    SVT (supraventricular tachycardia) (HCC)  Resolved Problems:    * No resolved hospital problems. *              Electronically signed by: Electronically signed by Melcohr Prather.  VIVIANA Tate CNP on 9/14/2022 at 11:21 AM

## 2022-09-14 NOTE — PROGRESS NOTES
Occupational Therapy    Occupational Therapy Treatment Note    Name: Catarina Meier MRN: [id number] :   1940   Date:  2022   Admission Date: 2022 Room:  -A     Primary Problem:  The primary encounter diagnosis was Urinary tract infection associated with cystostomy catheter, initial encounter ([facility] [address] A diagnosis of [facility] was also pertinent to this visit. Restrictions/Precautions: general precautions, fall risk, contact precautions  family at bedside    Communication with other providers: RN approved session. Subjective:  Patient states:  \"I am going for procedure\"  Pain:   Location, Type, Intensity (0/10 to 10/10):  denies pain c/o of general discomfort. Objective:    Observation: Pt supine in bed upon arrival   Objective Measures:  Pt alert and oriented. Treatment, including education:  Therapeutic Activity Training:   Therapeutic activity training was instructed today. Cues were given for safety, sequence, UE/LE placement, awareness, and balance. Activities performed today included bed mobility training, sup-sit. Pt supine in bed upon arrival and agreeable to therapy session. Pt completed supine to sit with MAX A with increased verbal cues for hand placement and technique. Pt completed sitting edge of bed with fair balance with noted limited ability to maintain trunk control in one plane of motion. Pt completed sitting edge of bed approx 10-12 minutes. Pt returned supine in bed with MAX A and positioned side lying to increased position changes. Pt needs met and call light in reach. Self Care Training:   Cues were given for safety, sequence, UE/LE placement, visual cues, and balance. Activities performed today included grooming. Pt sitting edge of bed with fair balance approx 10-12 minutes with set up and use of bed built up handles to complete oral hygiene.   Pt noted increased ability to  toothbrush and completed partial self feeding with MOD A to bring R hand to mouth to clean teeth. Pt appeared have increased difficulties with what appears to be sequencing of task. Pt completed face washing with set up and MIN A for completion of task. Pt noted increased fatigue and requested to return to supine with MAX A. Assessment / Impression:    Patient's tolerance of treatment: fair-  Adverse Reaction: none  Significant change in status and impact:  none  Barriers to improvement: activity tolerance    Goals:  Time frame for goals: 2 weeks  Pt will complete feeding tasks with ind  Pt will complete grooming tasks with min A standing at sink- addressed edge of bed  Pt will complete toileting tasks with mod A using standard commode  Pt will complete UB dressing and bathing tasks with min A  Pt will complete LB dressing and bathing tasks with mod A  Pt will complete therapeutic exercise/activity to increase independence in ADL/IADL function  Pt will practice functional transfers and mobility with AD for increased safety and independence-addressed    Plan for Next Session:    Continue OT POC and progress seated ADLs and increased OOB tolerance.     Time in:  0845  Time out:  0923  Timed treatment minutes:  38  Total treatment time:  38      Electronically signed by:    KHARI Lares,   9/14/2022, 8:55 AM

## 2022-09-15 LAB
ALBUMIN SERPL-MCNC: 2.6 GM/DL (ref 3.4–5)
ALP BLD-CCNC: 96 IU/L (ref 40–128)
ALT SERPL-CCNC: 9 U/L (ref 10–40)
ANION GAP SERPL CALCULATED.3IONS-SCNC: 11 MMOL/L (ref 4–16)
AST SERPL-CCNC: 25 IU/L (ref 15–37)
BASOPHILS ABSOLUTE: 0.1 K/CU MM
BASOPHILS RELATIVE PERCENT: 0.8 % (ref 0–1)
BILIRUB SERPL-MCNC: 0.2 MG/DL (ref 0–1)
BUN BLDV-MCNC: 33 MG/DL (ref 6–23)
CALCIUM SERPL-MCNC: 8.1 MG/DL (ref 8.3–10.6)
CHLORIDE BLD-SCNC: 105 MMOL/L (ref 99–110)
CO2: 27 MMOL/L (ref 21–32)
CREAT SERPL-MCNC: 2.6 MG/DL (ref 0.9–1.3)
DIFFERENTIAL TYPE: ABNORMAL
EOSINOPHILS ABSOLUTE: 0.5 K/CU MM
EOSINOPHILS RELATIVE PERCENT: 6.3 % (ref 0–3)
GFR AFRICAN AMERICAN: 29 ML/MIN/1.73M2
GFR NON-AFRICAN AMERICAN: 24 ML/MIN/1.73M2
GLUCOSE BLD-MCNC: 110 MG/DL (ref 70–99)
GLUCOSE BLD-MCNC: 113 MG/DL (ref 70–99)
GLUCOSE BLD-MCNC: 136 MG/DL (ref 70–99)
GLUCOSE BLD-MCNC: 186 MG/DL (ref 70–99)
GLUCOSE BLD-MCNC: 202 MG/DL (ref 70–99)
GLUCOSE BLD-MCNC: 209 MG/DL (ref 70–99)
GLUCOSE BLD-MCNC: 228 MG/DL (ref 70–99)
GLUCOSE BLD-MCNC: 243 MG/DL (ref 70–99)
HCT VFR BLD CALC: 27.7 % (ref 42–52)
HEMOGLOBIN: 8.8 GM/DL (ref 13.5–18)
IMMATURE NEUTROPHIL %: 0.5 % (ref 0–0.43)
LYMPHOCYTES ABSOLUTE: 1.3 K/CU MM
LYMPHOCYTES RELATIVE PERCENT: 17 % (ref 24–44)
MAGNESIUM: 2.1 MG/DL (ref 1.8–2.4)
MCH RBC QN AUTO: 30.3 PG (ref 27–31)
MCHC RBC AUTO-ENTMCNC: 31.8 % (ref 32–36)
MCV RBC AUTO: 95.5 FL (ref 78–100)
MONOCYTES ABSOLUTE: 0.7 K/CU MM
MONOCYTES RELATIVE PERCENT: 9.4 % (ref 0–4)
NUCLEATED RBC %: 0 %
PDW BLD-RTO: 15.3 % (ref 11.7–14.9)
PLATELET # BLD: 291 K/CU MM (ref 140–440)
PMV BLD AUTO: 10.4 FL (ref 7.5–11.1)
POTASSIUM SERPL-SCNC: 3.6 MMOL/L (ref 3.5–5.1)
RBC # BLD: 2.9 M/CU MM (ref 4.6–6.2)
SEGMENTED NEUTROPHILS ABSOLUTE COUNT: 5 K/CU MM
SEGMENTED NEUTROPHILS RELATIVE PERCENT: 66 % (ref 36–66)
SODIUM BLD-SCNC: 143 MMOL/L (ref 135–145)
TOTAL IMMATURE NEUTOROPHIL: 0.04 K/CU MM
TOTAL NUCLEATED RBC: 0 K/CU MM
TOTAL PROTEIN: 5.2 GM/DL (ref 6.4–8.2)
WBC # BLD: 7.6 K/CU MM (ref 4–10.5)

## 2022-09-15 PROCEDURE — 2580000003 HC RX 258: Performed by: ANESTHESIOLOGY

## 2022-09-15 PROCEDURE — 94150 VITAL CAPACITY TEST: CPT

## 2022-09-15 PROCEDURE — 6370000000 HC RX 637 (ALT 250 FOR IP): Performed by: SPECIALIST

## 2022-09-15 PROCEDURE — 2580000003 HC RX 258: Performed by: INTERNAL MEDICINE

## 2022-09-15 PROCEDURE — 99232 SBSQ HOSP IP/OBS MODERATE 35: CPT | Performed by: NURSE PRACTITIONER

## 2022-09-15 PROCEDURE — 2580000003 HC RX 258: Performed by: STUDENT IN AN ORGANIZED HEALTH CARE EDUCATION/TRAINING PROGRAM

## 2022-09-15 PROCEDURE — 6360000002 HC RX W HCPCS: Performed by: INTERNAL MEDICINE

## 2022-09-15 PROCEDURE — 6370000000 HC RX 637 (ALT 250 FOR IP): Performed by: NURSE PRACTITIONER

## 2022-09-15 PROCEDURE — 2580000003 HC RX 258: Performed by: NURSE PRACTITIONER

## 2022-09-15 PROCEDURE — 6370000000 HC RX 637 (ALT 250 FOR IP): Performed by: INTERNAL MEDICINE

## 2022-09-15 PROCEDURE — 82962 GLUCOSE BLOOD TEST: CPT

## 2022-09-15 PROCEDURE — 36415 COLL VENOUS BLD VENIPUNCTURE: CPT

## 2022-09-15 PROCEDURE — 80053 COMPREHEN METABOLIC PANEL: CPT

## 2022-09-15 PROCEDURE — 85025 COMPLETE CBC W/AUTO DIFF WBC: CPT

## 2022-09-15 PROCEDURE — 83735 ASSAY OF MAGNESIUM: CPT

## 2022-09-15 PROCEDURE — 94761 N-INVAS EAR/PLS OXIMETRY MLT: CPT

## 2022-09-15 PROCEDURE — 6360000002 HC RX W HCPCS: Performed by: NURSE PRACTITIONER

## 2022-09-15 PROCEDURE — 1200000000 HC SEMI PRIVATE

## 2022-09-15 RX ADMIN — CEFIDEROCOL SULFATE TOSYLATE 1000 MG: 1 INJECTION, POWDER, FOR SOLUTION INTRAVENOUS at 10:57

## 2022-09-15 RX ADMIN — ACETAMINOPHEN 650 MG: 325 TABLET ORAL at 21:04

## 2022-09-15 RX ADMIN — INSULIN LISPRO 2 UNITS: 100 INJECTION, SOLUTION INTRAVENOUS; SUBCUTANEOUS at 17:14

## 2022-09-15 RX ADMIN — Medication: at 10:53

## 2022-09-15 RX ADMIN — FERROUS SULFATE TAB 325 MG (65 MG ELEMENTAL FE) 325 MG: 325 (65 FE) TAB at 17:06

## 2022-09-15 RX ADMIN — CARVEDILOL 6.25 MG: 6.25 TABLET, FILM COATED ORAL at 09:25

## 2022-09-15 RX ADMIN — CARVEDILOL 6.25 MG: 6.25 TABLET, FILM COATED ORAL at 17:06

## 2022-09-15 RX ADMIN — SODIUM CHLORIDE: 9 INJECTION, SOLUTION INTRAVENOUS at 10:57

## 2022-09-15 RX ADMIN — CEFIDEROCOL SULFATE TOSYLATE 1000 MG: 1 INJECTION, POWDER, FOR SOLUTION INTRAVENOUS at 04:21

## 2022-09-15 RX ADMIN — Medication: at 14:30

## 2022-09-15 RX ADMIN — SODIUM CHLORIDE, PRESERVATIVE FREE 10 ML: 5 INJECTION INTRAVENOUS at 21:04

## 2022-09-15 RX ADMIN — ATORVASTATIN CALCIUM 80 MG: 40 TABLET, FILM COATED ORAL at 21:04

## 2022-09-15 RX ADMIN — FERROUS SULFATE TAB 325 MG (65 MG ELEMENTAL FE) 325 MG: 325 (65 FE) TAB at 09:25

## 2022-09-15 RX ADMIN — Medication 10 ML: at 09:29

## 2022-09-15 RX ADMIN — Medication: at 21:04

## 2022-09-15 RX ADMIN — INSULIN LISPRO 2 UNITS: 100 INJECTION, SOLUTION INTRAVENOUS; SUBCUTANEOUS at 21:30

## 2022-09-15 RX ADMIN — INSULIN GLARGINE 15 UNITS: 100 INJECTION, SOLUTION SUBCUTANEOUS at 21:30

## 2022-09-15 RX ADMIN — CEFIDEROCOL SULFATE TOSYLATE 1000 MG: 1 INJECTION, POWDER, FOR SOLUTION INTRAVENOUS at 21:27

## 2022-09-15 RX ADMIN — LEVOTHYROXINE SODIUM 75 MCG: 75 TABLET ORAL at 09:25

## 2022-09-15 RX ADMIN — ASPIRIN 81 MG CHEWABLE TABLET 81 MG: 81 TABLET CHEWABLE at 09:25

## 2022-09-15 RX ADMIN — AMLODIPINE BESYLATE 10 MG: 10 TABLET ORAL at 09:25

## 2022-09-15 RX ADMIN — SODIUM CHLORIDE, PRESERVATIVE FREE 10 ML: 5 INJECTION INTRAVENOUS at 09:25

## 2022-09-15 RX ADMIN — Medication 10 MG: at 21:04

## 2022-09-15 RX ADMIN — DEXTROSE MONOHYDRATE 100 MG: 50 INJECTION, SOLUTION INTRAVENOUS at 14:31

## 2022-09-15 RX ADMIN — PANTOPRAZOLE SODIUM 40 MG: 40 TABLET, DELAYED RELEASE ORAL at 09:25

## 2022-09-15 ASSESSMENT — PAIN SCALES - GENERAL
PAINLEVEL_OUTOF10: 0
PAINLEVEL_OUTOF10: 0

## 2022-09-15 NOTE — CARE COORDINATION
Chart reviewed. Patient may be discharged to [facility] when medically stable. Will need PASSR completed when discharged.  Starr Olivo RN     PATIENT MAY BE DISCHARGED TO [address] WHEN MEDICALLY STABLE -   COMPLETE MYRIAM - RN/PHYSICIAN  COPY MYRIAM - FAX -[phone]  ADD MYRIAM TO PACKET   CALL REPORT -[phone]  CALL FAMILY  CALL TRANSPORT-  [address] [place] [address]

## 2022-09-15 NOTE — PROGRESS NOTES
V2.0  [facility] [place]      Name:  Della Prado /Age/Sex: 1940  (78 y.o. male)   MRN & CSN:  [id number] & [id number] Encounter Date/Time: 9/15/2022 6:54 PM EDT    Location:  -A PCP: Dean Freed MD       Hospital Day: 23    Assessment and Plan:   Della Prado is a 78 y.o. male with pmh of prostate cancer, diabetes mellitus, hypertension who admitted with sciatic found out with Complicated UTI (urinary tract infection)    #Candida albicans fungemia  #Candida UTI  -Transthoracic echo inconclusive  - Spoke to ophthalmology, recommended checking visual acuity, per my evaluation, patient's vision is 20/20 bilaterally (with glasses on)  -No growth on catheter tip culture for 36-48h  -Repeat Bcx with no growth at 24h  -RAFFI negative for vegetations    Plan:  MRSA nares  ID following, appreciate recs  Continue Eraxis (started )  Follow-up on central line tip culture and repeat blood cultures     #ESBL bacteremia in the setting of UTI  #Complicated UTI  - Urine culture on  growing E. coli resistant to Cipro and cefepime  - Urine culture on  no significant growth  - CT abdomen/pelvis showing left ureteral stent in place, mild bilateral urinary tract dilatation with new right perinephric and periureteral stranding raises concern for UTI    Plan:  Continue cefiderocol  ID following, appreciated recs     #Chronic urinary retention managed with suprapubic catheter and exchanged monthly, last exchange  with a cystoscopy patient had hematuria and patient became lethargic and drowsy with elevated leukocytes discussed with ID started on vancomycin and fluconazole and  obtaining blood cultures and urine cultures urine and blood cultures came back positive for Candida albicans treated as above  # Hematuria s/p cystoscopy and exchange of stent  -Urology now following peripherally, recommended to manually irrigate bladder every 4 hours and as needed clots         #Patient with RUE swelling-Improving  -ultrasound RUE negative for DVT Likely due to volume overload in the setting of PAMELLA    Chronic medical problems:      # Normocytic anemia   - Continue with H&H  - Transfuse if hemoglobin less than 7     # History of prostate cancer s/p prostatectomy 1996    #Hypertension  Plan: continue home meds     # Hypothyroidism   Plan: continue levothyroxine     # Type 2 diabetes  Continue SSI, hypoglycemic protocol diabetic diet      Diet ADULT DIET; Regular; 4 carb choices (60 gm/meal)  ADULT ORAL NUTRITION SUPPLEMENT; Breakfast, Dinner; Diabetic Oral Supplement   DVT Prophylaxis [x] Lovenox, []  Heparin, [] SCDs, [] Ambulation,  [] Eliquis, [] Xarelto  [] Coumadin   Code Status Full Code   Disposition From: Home  Expected Disposition:  SNF  Estimated Date of Discharge: Greater than 2 days  Patient requires continued admission due to candidemia, UTI requiring IV antibiotics   Surrogate Decision Maker/ POA Richard [place], [place], Caryle Grade. Subjective:     Chief Complaint: Fatigue (General weakness started this am. Family called because pt had changed from baseline. Currently being tx for UTI. Pt is slightly confused, doesn't know the year. He has been N/V. )     Patient states that he feels okay today. He doesn't have any complaints. He states that his vision is unchanged. Review of Systems:    General: No fever, no chills  Eyes: No blurry vision  Heart: No chest pain, no palpitations  Lungs: No shortness of breath, no cough  Abdomen: No abdominal pain, no nausea, no vomiting, no constipation, no diarrhea  : No frequency, no dysuria, no urgency, no decreased urination  MSK: No lower extremity swelling  Neuro: No confusion, no weakness  Skin: No rashes    Objective:      Intake/Output Summary (Last 24 hours) at 9/15/2022 1322  Last data filed at 9/15/2022 0900  Gross per 24 hour   Intake 290 ml   Output 1600 ml   Net -1310 ml        Vitals:   Vitals:    09/15/22 0925   BP: (!) 166/67   Pulse:    Resp:    Temp:    SpO2:        Physical Exam:     General: NAD, AAO x3-4  Eyes: EOMI, no scleral icterus, no conjunctival pallor  HENT: Atraumatic, no lymphadenopathy, neck supple  Resp: no respiratory distress  GI: Normal bowel sounds, soft, nondistended, nontender  MSK: Normal ROM, no lower extremity edema, bilateral hand swelling  Skin: Intact, dry, warm, no rashes  Neuro: CN II to XII grossly intact  Psych: Normal mood    Medications:   Medications:    chlorhexidine gluconate   Topical Daily    mupirocin   Nasal TID    insulin glargine  15 Units SubCUTAneous Nightly    insulin lispro  0-8 Units SubCUTAneous 2 times per day    anidulafungin  100 mg IntraVENous Q24H    insulin lispro  0-8 Units SubCUTAneous TID WC    Cefiderocol Sulfate Tosylate  1,000 mg IntraVENous Q8H    lidocaine PF  5 mL IntraDERmal Once    sodium chloride flush  5-40 mL IntraVENous 2 times per day    sodium chloride flush  5-40 mL IntraVENous 2 times per day    meperidine  12.5 mg IntraVENous Once    ferrous sulfate  325 mg Oral BID WC    carvedilol  6.25 mg Oral BID WC    lidocaine  1 patch TransDERmal Daily    amLODIPine  10 mg Oral Daily    aspirin  81 mg Oral Daily    atorvastatin  80 mg Oral Nightly    levothyroxine  75 mcg Oral Daily    melatonin  10 mg Oral Nightly    pantoprazole  40 mg Oral QAM AC    sodium chloride flush  5-40 mL IntraVENous 2 times per day    [Held by provider] enoxaparin  40 mg SubCUTAneous QPM      Infusions:    sodium chloride      sodium chloride      sodium chloride 20 mL/hr at 09/15/22 1057    sodium chloride      dextrose      sodium chloride 25 mL (09/06/22 0937)    dextrose       PRN Meds: sodium chloride, , PRN  bisacodyl, 10 mg, Daily PRN  docusate sodium, 100 mg, BID PRN  polyethylene glycol, 17 g, Daily PRN  sodium chloride, , PRN  sodium chloride flush, 5-40 mL, PRN  sodium chloride, , PRN  sodium chloride flush, 5-40 mL, PRN  sodium chloride, 25 mL, PRN  HYDROmorphone, 0.25 mg, Q5 Min PRN  fentanNYL, 50 mcg, Q5 Min PRN  glucose, 4 tablet, PRN  dextrose bolus, 125 mL, PRN   Or  dextrose bolus, 250 mL, PRN  glucagon (rDNA), 1 mg, PRN  dextrose, , Continuous PRN  guaiFENesin-dextromethorphan, 5 mL, Q4H PRN  magnesium hydroxide, 30 mL, Daily PRN  sodium chloride flush, 5-40 mL, PRN  sodium chloride, , PRN  ondansetron, 4 mg, Q8H PRN   Or  ondansetron, 4 mg, Q6H PRN  aluminum & magnesium hydroxide-simethicone, 30 mL, Q6H PRN  acetaminophen, 650 mg, Q6H PRN   Or  acetaminophen, 650 mg, Q6H PRN  glucose, 4 tablet, PRN  dextrose bolus, 125 mL, PRN   Or  dextrose bolus, 250 mL, PRN  glucagon (rDNA), 1 mg, PRN  dextrose, , Continuous PRN  ipratropium-albuterol, 1 vial, BID PRN      Labs      Recent Results (from the past 24 hour(s))   POCT Glucose    Collection Time: 09/14/22  4:36 PM   Result Value Ref Range    POC Glucose 91 70 - 99 MG/DL   POCT Glucose    Collection Time: 09/14/22  7:54 PM   Result Value Ref Range    POC Glucose 150 (H) 70 - 99 MG/DL   POCT Glucose    Collection Time: 09/15/22  2:41 AM   Result Value Ref Range    POC Glucose 136 (H) 70 - 99 MG/DL   Comprehensive Metabolic Panel    Collection Time: 09/15/22  5:59 AM   Result Value Ref Range    Sodium 143 135 - 145 MMOL/L    Potassium 3.6 3.5 - 5.1 MMOL/L    Chloride 105 99 - 110 mMol/L    CO2 27 21 - 32 MMOL/L    BUN 33 (H) 6 - 23 MG/DL    Creatinine 2.6 (H) 0.9 - 1.3 MG/DL    Glucose 110 (H) 70 - 99 MG/DL    Calcium 8.1 (L) 8.3 - 10.6 MG/DL    Albumin 2.6 (L) 3.4 - 5.0 GM/DL    Total Protein 5.2 (L) 6.4 - 8.2 GM/DL    Total Bilirubin 0.2 0.0 - 1.0 MG/DL    ALT 9 (L) 10 - 40 U/L    AST 25 15 - 37 IU/L    Alkaline Phosphatase 96 40 - 128 IU/L    GFR Non- 24 (L) >60 mL/min/1.73m2    GFR  29 (L) >60 mL/min/1.73m2    Anion Gap 11 4 - 16   CBC with Auto Differential    Collection Time: 09/15/22  5:59 AM   Result Value Ref Range    WBC 7.6 4.0 - 10.5 K/CU MM    RBC 2.90 (L) 4.6 - 6.2 M/CU MM    Hemoglobin 8.8 (L) 13.5 - 18.0 GM/DL    Hematocrit 27.7 (L) 42 - 52 %    MCV 95.5 78 - 100 FL    MCH 30.3 27 - 31 PG    MCHC 31.8 (L) 32.0 - 36.0 %    RDW 15.3 (H) 11.7 - 14.9 %    Platelets 217 069 - 854 K/CU MM    MPV 10.4 7.5 - 11.1 FL    Differential Type AUTOMATED DIFFERENTIAL     Segs Relative 66.0 36 - 66 %    Lymphocytes % 17.0 (L) 24 - 44 %    Monocytes % 9.4 (H) 0 - 4 %    Eosinophils % 6.3 (H) 0 - 3 %    Basophils % 0.8 0 - 1 %    Segs Absolute 5.0 K/CU MM    Lymphocytes Absolute 1.3 K/CU MM    Monocytes Absolute 0.7 K/CU MM    Eosinophils Absolute 0.5 K/CU MM    Basophils Absolute 0.1 K/CU MM    Nucleated RBC % 0.0 %    Total Nucleated RBC 0.0 K/CU MM    Total Immature Neutrophil 0.04 K/CU MM    Immature Neutrophil % 0.5 (H) 0 - 0.43 %   Magnesium    Collection Time: 09/15/22  5:59 AM   Result Value Ref Range    Magnesium 2.1 1.8 - 2.4 mg/dl   POCT Glucose    Collection Time: 09/15/22  7:40 AM   Result Value Ref Range    POC Glucose 113 (H) 70 - 99 MG/DL   POCT Glucose    Collection Time: 09/15/22 11:03 AM   Result Value Ref Range    POC Glucose 186 (H) 70 - 99 MG/DL   POCT Glucose    Collection Time: 09/15/22 11:28 AM   Result Value Ref Range    POC Glucose 202 (H) 70 - 99 MG/DL        Imaging/Diagnostics Last 24 Hours   Echocardiogram complete 2D with doppler with color    Result Date: 9/12/2022  Transthoracic Echocardiography Report (TTE)  Demographics   Patient Name      Jenn MARCIAL     Date of Study       09/12/2022   Date of Birth     1940         Gender              Male   Age               78 year(s)         Race                Black   Patient Number    [id number]         Room Number         2006   Visit Number      [id number]   Corporate ID      [id number]   Accession Number  [id number]         [address] [place] [address]  Physician MELENDEZ APRN-CNP         Physician           MD  Procedure Type of Study   TTE procedure:ECHOCARDIOGRAM COMPLETE 2D W DOPPLER W COLOR. Procedure Date Date: 09/12/2022 Start: 02:23 PM Study Location: Portable Technical Quality: Adequate visualization Indications:Endocarditis. Patient Status: Routine Height: 68 inches Weight: 210 pounds BSA: 2.09 m2 BMI: 31.93 kg/m2 HR: 68 bpm BP: 141/57 mmHg  Conclusions   Summary  Left ventricular systolic function is normal.  Ejection fraction is visually estimated at 60%. Grade II diastolic dysfunction. Cannot rule out endocarditis on the tricuspid valve. No evidence of any pericardial effusion. Signature   ------------------------------------------------------------------  Electronically signed by Nguyen Martinez MD (Interpreting  physician) on 09/12/2022 at 06:43 PM  ------------------------------------------------------------------   Findings   Left Ventricle  Left ventricular systolic function is normal.  Ejection fraction is visually estimated at 60%. No regional wall motion abnormalites. Grade II diastolic dysfunction. Left ventricle size is normal.   Left Atrium  Essentially normal left atrium. Right Atrium  Essentially normal right atrium. Right Ventricle  Essentially normal right ventricle. Aortic Valve  Sclerotic, but non-stenotic aortic valve. No significant valvular disease noted. Mitral Valve  Structurally normal mitral valve. Mild mitral regurgitation. Tricuspid Valve  Tricuspid valve is structurally normal.  Mild tricuspid regurgitation. Cannot rule out endocarditis on the tricuspid valve. Pulmonic Valve  Pulmonic valve is structurally normal.  No significant valvular disease noted. Pericardial Effusion  No evidence of any pericardial effusion. Pleural Effusion  No evidence of pleural effusion. Miscellaneous  IVC and abdominal aorta are within normal limits.   M-Mode/2D Measurements & Calculations   LV Diastolic Dimension:  LV Systolic Dimension:  LA Dimension: 3.7 cmAO Root  3.68 cm                  2.44 cm                 Dimension: 3.3 cmLA Area:  LV FS:33.7 %             LV Volume Diastolic: 62 66.7 cm2  LV PW Diastolic: 1.3 cm  ml  LV PW Systolic: 3.09 cm  LV Volume Systolic: 26  Septum Diastolic: 9.49   ml  cm                       LV EDV/LV EDV Index: 62  Septum Systolic: 7.21 cm NG/51 A7BY ESV/LV ESV   LA/Aorta: 1.12  CO: 4.47 l/min           Index: 26 ml/12 m2      Ascending Aorta: 3.4 cm  CI: 2.14 l/m*m2          EF Calculated (A4C):    LA volume/Index: 27 ml                           58.1 %                  /32L4  LV Area Diastolic: 24    EF Calculated (2D):  cm2                      63.4 %  LV Area Systolic: 02.4  cm2                      LV Length: 7.77 cm                            LVOT: 2.1 cm  Doppler Measurements & Calculations   MV Peak E-Wave: 110     AV Peak Velocity: 150 cm/s  LVOT Peak Velocity: 88  cm/s                    AV Peak Gradient: 9 mmHg    cm/s  MV Peak A-Wave: 89.3    AV Mean Velocity: 107 cm/s  LVOT Mean Velocity: 65  cm/s                    AV Mean Gradient: 5 mmHg    cm/s  MV E/A Ratio: 1.23      AV VTI: 31.9 cm             LVOT Peak Gradient: 3  MV Peak Gradient: 4.84  AV Area (Continuity):2.06   mmHgLVOT Mean Gradient:  mmHg                    cm2                         2 mmHg  MV Mean Gradient: 4                                 Estimated RVSP: 35 mmHg  mmHg                    LVOT VTI: 19 cm             Estimated RAP:3 mmHg  MV Mean Velocity: 90.2  cm/s                    Estimated PASP: 35.49 mmHg  MV Deceleration Time:                               TR Velocity:285 cm/s  203 msec                                            TR Gradient:32.49 mmHg   MV Area (continuity):  1.67 cm2  MV E' Septal Velocity:  8.33 cm/s  MV A' Septal Velocity:  7.13 cm/s  MV E' Lateral Velocity:  8.11 cm/s  MV A' Lateral Velocity:  9.32 cm/s  MV E/E' septal: 13.21  MV E/E' lateral: 13.56        Electronically signed by [employer] MD Brett on 9/15/2022 at 1:22 PM

## 2022-09-15 NOTE — PLAN OF CARE
Problem: Discharge Planning  Goal: Discharge to home or other facility with appropriate resources  Outcome: Progressing  Flowsheets (Taken 9/15/2022 9081)  Discharge to home or other facility with appropriate resources:   Identify barriers to discharge with patient and caregiver   Arrange for needed discharge resources and transportation as appropriate   Identify discharge learning needs (meds, wound care, etc)     Problem: Skin/Tissue Integrity  Goal: Absence of new skin breakdown  Description: 1. Monitor for areas of redness and/or skin breakdown  2. Assess vascular access sites hourly  3. Every 4-6 hours minimum:  Change oxygen saturation probe site  4. Every 4-6 hours:  If on nasal continuous positive airway pressure, respiratory therapy assess nares and determine need for appliance change or resting period.   Outcome: Progressing     Problem: Chronic Conditions and Co-morbidities  Goal: Patient's chronic conditions and co-morbidity symptoms are monitored and maintained or improved  Outcome: Progressing  Flowsheets (Taken 9/15/2022 0821)  Care Plan - Patient's Chronic Conditions and Co-Morbidity Symptoms are Monitored and Maintained or Improved: Monitor and assess patient's chronic conditions and comorbid symptoms for stability, deterioration, or improvement     Problem: Pain  Goal: Verbalizes/displays adequate comfort level or baseline comfort level  Outcome: Progressing     Problem: Safety - Adult  Goal: Free from fall injury  Outcome: Progressing     Problem: Nutrition Deficit:  Goal: Optimize nutritional status  Outcome: Progressing

## 2022-09-15 NOTE — PROGRESS NOTES
Patient arrive to unit as transfer from ICU Stepdown via bed, oriented to unit and staff, call light and bedside table in reach.

## 2022-09-15 NOTE — PROGRESS NOTES
Infectious Disease Progress Note  9/15/2022   Patient Name: Della Prado : 1940     Assessment  Sepsis secondary to ESBL E. coli pyelonephritis associated with suprapubic cystostomy. Candida albicans Fungemia Source Unclear,  Possible Endocarditis:     MRSA Screen Positive:    History of prostate cancer status post RPP. ESBL E coli bacteremia. S/p cystoscopy, left ureteral stent exchange and peritoneal mccrary catheter exchange on 2022. Fever has worsened, CRP and pct is elevated. raises question of persistent ESBL bacteremia, CRE, fungal UTI and pneumonia. Hematuria: post procedure  ?ileus  CKD stage IIIB  T2DM  Diabetic neuropathy     Plan  Therapeutic: -d/c meropenem 1 g q 12 hours . Continue IV Fetroja 1 gm q8h x 14 days (end date 22)    DC IV vancomycin (-10/22)   Continue IV Eraxis 100 mg q24h for 2 weeks from negative BC at 48h, duration (end date 22)  De-colonize MRSA ordered: Bactroban ointment to nares x 5 days, Hibiclens baths daily x 10 days  Diagnostic: MRSA nares positive, await repeat [facility]   Recommend consult with ophthalmology to evaluate for possible endophthalmitis   Reviewed RAFFI, no evidence of endocarditis. For DC: Follow up with ID in 2 weeks please  Weekly labs drawn on Monday during the course of treatment  CBC with differential, CMP, ESR, CRP  Fax results to Attn: Nirali Solomon Infectious Diseases Staff  # [phone]   OK from ID standpoint to DC when ready    Reason for visit: F/u ESBL E coli bacteremia and left pyelonephritis? History:? Interval history noted  Denies n/v/d/f or untoward effects of antimicrobials.       Physical Exam:  Vital Signs: BP (!) 166/67   Pulse 70   Temp 97.9 °F (36.6 °C) (Oral)   Resp 17   Ht 5' 8\" (1.727 m)   Wt 210 lb 15.4 oz (95.7 kg)   SpO2 98%   BMI 32.08 kg/m²     Gen:  resting in bed, A&O, no distress  HEMT: AT/NC Oropharynx pink, moist, and without lesions or exudates; dentition in good state of repair  Eyes: PERRLA, EOMI, conjunctiva pink, sclera anicteric. Neck: Supple. Trachea midline. No LAD. Chest: no distress and CTA. Good air movement. Heart: RRR and no MRG. Abd: suprapubic catheter, soft, gaseous distension, no tenderness, no hepatomegaly. Normoactive bowel sounds. Ext: no clubbing, cyanosis, or edema  Catheter Site: without erythema or tenderness  Neuro: Mental status intact. CN 2-12 intact and no focal sensory or motor deficits     Radiologic / Imaging / TESTING  9/10/22 XR Chest:  Impression   1. Right IJ line with the tip overlying the distal SVC/right atrial region. 2. Low lung volumes with bibasilar atelectasis. No significant change. 9/10/22 CT Abdomen Pelvis WO Contrast:  Impression   1. Probable hematuria left urinary collecting system (possible active   hemorrhage into the left urinary collecting system) and mild-to-moderate left   hydronephrosis. 2. The double-J stent appears in unremarkable position without left ureter   calculus evident. 3. Trace abdominopelvic ascites is probably reactive. 4.  Vascular calcifications are noted reflecting calcific atherosclerosis. 5. Trace left pleural effusion with left basilar relaxation atelectasis or   infiltrate. 6. Minimal subsegmental atelectasis posterior right lung base. 7. Small hiatal hernia. 9/12/22 Echo Complete:   Summary   Left ventricular systolic function is normal.   Ejection fraction is visually estimated at 60%. Grade II diastolic dysfunction. Cannot rule out endocarditis on the tricuspid valve. No evidence of any pericardial effusion.     Labs:    Recent Results (from the past 24 hour(s))   POCT Glucose    Collection Time: 09/14/22 12:08 PM   Result Value Ref Range    POC Glucose 90 70 - 99 MG/DL   POCT Glucose    Collection Time: 09/14/22  4:36 PM   Result Value Ref Range    POC Glucose 91 70 - 99 MG/DL   POCT Glucose    Collection Time: 09/14/22  7:54 PM   Result Value Ref Range    POC Glucose 150 (H) 70 - 99 MG/DL   POCT Glucose    Collection Time: 09/15/22  2:41 AM   Result Value Ref Range    POC Glucose 136 (H) 70 - 99 MG/DL   Comprehensive Metabolic Panel    Collection Time: 09/15/22  5:59 AM   Result Value Ref Range    Sodium 143 135 - 145 MMOL/L    Potassium 3.6 3.5 - 5.1 MMOL/L    Chloride 105 99 - 110 mMol/L    CO2 27 21 - 32 MMOL/L    BUN 33 (H) 6 - 23 MG/DL    Creatinine 2.6 (H) 0.9 - 1.3 MG/DL    Glucose 110 (H) 70 - 99 MG/DL    Calcium 8.1 (L) 8.3 - 10.6 MG/DL    Albumin 2.6 (L) 3.4 - 5.0 GM/DL    Total Protein 5.2 (L) 6.4 - 8.2 GM/DL    Total Bilirubin 0.2 0.0 - 1.0 MG/DL    ALT 9 (L) 10 - 40 U/L    AST 25 15 - 37 IU/L    Alkaline Phosphatase 96 40 - 128 IU/L    GFR Non- 24 (L) >60 mL/min/1.73m2    GFR  29 (L) >60 mL/min/1.73m2    Anion Gap 11 4 - 16   CBC with Auto Differential    Collection Time: 09/15/22  5:59 AM   Result Value Ref Range    WBC 7.6 4.0 - 10.5 K/CU MM    RBC 2.90 (L) 4.6 - 6.2 M/CU MM    Hemoglobin 8.8 (L) 13.5 - 18.0 GM/DL    Hematocrit 27.7 (L) 42 - 52 %    MCV 95.5 78 - 100 FL    MCH 30.3 27 - 31 PG    MCHC 31.8 (L) 32.0 - 36.0 %    RDW 15.3 (H) 11.7 - 14.9 %    Platelets 895 195 - 740 K/CU MM    MPV 10.4 7.5 - 11.1 FL    Differential Type AUTOMATED DIFFERENTIAL     Segs Relative 66.0 36 - 66 %    Lymphocytes % 17.0 (L) 24 - 44 %    Monocytes % 9.4 (H) 0 - 4 %    Eosinophils % 6.3 (H) 0 - 3 %    Basophils % 0.8 0 - 1 %    Segs Absolute 5.0 K/CU MM    Lymphocytes Absolute 1.3 K/CU MM    Monocytes Absolute 0.7 K/CU MM    Eosinophils Absolute 0.5 K/CU MM    Basophils Absolute 0.1 K/CU MM    Nucleated RBC % 0.0 %    Total Nucleated RBC 0.0 K/CU MM    Total Immature Neutrophil 0.04 K/CU MM    Immature Neutrophil % 0.5 (H) 0 - 0.43 %   Magnesium    Collection Time: 09/15/22  5:59 AM   Result Value Ref Range    Magnesium 2.1 1.8 - 2.4 mg/dl   POCT Glucose    Collection Time: 09/15/22  7:40 AM   Result Value Ref Range    POC Glucose 113 (H) 70 - 99 MG/DL CULTURE results: Invalid input(s): BLOOD CULTURE,  URINE CULTURE, SURGICAL CULTURE    Diagnosis:  Patient Active Problem List   Diagnosis    Gait disturbance    Generalized weakness    Diabetes mellitus (HCC)    Osteoarthritis    Anemia of chronic disorder    Infected implanted bladder sphincter cuff    Escherichia coli urinary tract infection    Hypertension    Sepsis ([address]    Acute encephalopathy    Type 2 diabetes mellitus with hypoglycemia without coma ([address]    UTI (urinary tract infection) due to urinary indwelling catheter ([facility])    Hyperkalemia    Acute kidney failure ([facility])    Leg weakness, bilateral    Type 2 diabetes mellitus with neurological manifestations, uncontrolled ([address]    Complicated UTI (urinary tract infection)    Essential hypertension    Severe muscle deconditioning    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 ([facility])    Frequent falls    Chronic kidney disease, stage 3a ([address]    Uncontrolled type 2 diabetes mellitus with peripheral neuropathy ([facility])    Prostate cancer ([facility])    Suprapubic catheter ([address]    Sepsis due to urinary tract infection ([address]    Coagulase negative Staphylococcus bacteremia    Chronic kidney disease, stage IV (severe) ([facility])    Acute metabolic encephalopathy    SVT (supraventricular tachycardia) ([facility])    Metabolic encephalopathy    History of prostate cancer    Cigarette nicotine dependence without complication    Altered mental status    Serratia marcescens infection    Hypoglycemia    Fungemia       Active Problems  Principal Problem:    Complicated UTI (urinary tract infection)  Active Problems:    Anemia of chronic disorder    Fungemia    Chronic kidney disease, stage 3a (HCC)    SVT (supraventricular tachycardia) ([facility])  Resolved Problems:    * No resolved hospital problems. *              Electronically signed by: Electronically signed by VIVIANA Sanchez CNP on 9/15/2022 at 9:52 AM

## 2022-09-15 NOTE — PROGRESS NOTES
Physician Progress Note      PATIENT:               Gaston Liao  CSN #:                  [id number]  :                       1940  ADMIT DATE:       2022 6:18 PM  [address] [place] DATE:  RESPONDING  PROVIDER #:        Aurora Le MD          QUERY TEXT:    Hospitalists,    Pt admitted with sepsis due to UTI and noted documentation of UTI due to [address]   cath documented by Infectious Disease consultant. If possible, please   document in progress notes and discharge summary:    The medical record reflects the following:  Risk Factors: SP cath,  ureteral stent  Clinical Indicators: documentation of SP cath related UTI documented by ID   consult. Treatment: labs, imaging, IV atb, cath and stent exchange    Thank you,  Casi Ibanez RN CDS  [phone]  Options provided:  -- SP cath related UTI confirmed present on admission  -- UTI due to [address] cath ruled out  -- Other - I will add my own diagnosis  -- Disagree - Not applicable / Not valid  -- Disagree - Clinically unable to determine / Unknown  -- Refer to Clinical Documentation Reviewer    PROVIDER RESPONSE TEXT:    The diagnosis of SP cath related to was confirmed as present on admission. Query created by: Dyllan Cordova on 2022 11:39 AM      QUERY TEXT:    Hospitalists,    Pt admitted with EBSL sepsis due to UTI and noted documentation of septic   shock b Nephrology on 22. If possible, please document in progress notes   and discharge summary:    The medical record reflects the following:  Risk Factors: ***  Clinical Indicators: Per Nephrology documentation: \"Acute kidney injury with   underlying CKD stage XLYg-[id number]-mtzock from septic shock-he also may have   renal vein congestion-also could be either tubular injury or acute   interstitial nephritis from antimicrobial agent  and another medication.    Septic shock with gram-negative colton-he will go for urological intervention   today with appropriate antimicrobial agent.\";;;No evidence of septic shock was   documented by the ED physician on admission. Treatment: labs, imaging, IV atb, IVF, Mag Sulfate    Thank you,  Flakito Carrero RN CDS  [phone]  Options provided:  -- Septic shock confirmed present on admission  -- Septic shock confirmed not present on admission  -- Septic shock ruled out  -- Other - I will add my own diagnosis  -- Disagree - Not applicable / Not valid  -- Disagree - Clinically unable to determine / Unknown  -- Refer to Clinical Documentation Reviewer    PROVIDER RESPONSE TEXT:    The diagnosis of septic shock was confirmed as present on admission.     Query created by: Jordi Lam on 9/13/2022 12:10 PM      Electronically signed by:  Laura Andrade MD 9/15/2022 1:28 PM

## 2022-09-15 NOTE — PROGRESS NOTES
Nephrology Progress Note  9/15/2022 9:51 AM        Subjective:   Admit Date: 8/28/2022  PCP: Kevin Ramsay MD    Interval History: Patient seen in early morning, this is a late entry    Diet: Reasonable    ROS: Mentally tired,  tired being in the hospital  Urine output recorded almost 2 L for the last 24 hours  No fever    Data:     Current meds:    insulin glargine  15 Units SubCUTAneous Nightly    insulin lispro  0-8 Units SubCUTAneous 2 times per day    anidulafungin  100 mg IntraVENous Q24H    insulin lispro  0-8 Units SubCUTAneous TID WC    Cefiderocol Sulfate Tosylate  1,000 mg IntraVENous Q8H    lidocaine PF  5 mL IntraDERmal Once    sodium chloride flush  5-40 mL IntraVENous 2 times per day    sodium chloride flush  5-40 mL IntraVENous 2 times per day    meperidine  12.5 mg IntraVENous Once    ferrous sulfate  325 mg Oral BID WC    carvedilol  6.25 mg Oral BID WC    lidocaine  1 patch TransDERmal Daily    amLODIPine  10 mg Oral Daily    aspirin  81 mg Oral Daily    atorvastatin  80 mg Oral Nightly    levothyroxine  75 mcg Oral Daily    melatonin  10 mg Oral Nightly    pantoprazole  40 mg Oral QAM AC    sodium chloride flush  5-40 mL IntraVENous 2 times per day    [Held by provider] enoxaparin  40 mg SubCUTAneous QPM      sodium chloride      sodium chloride      sodium chloride 25 mL/hr at 09/13/22 0248    sodium chloride      dextrose      sodium chloride 25 mL (09/06/22 2327)    dextrose           I/O last 3 completed shifts:   In: 100 [Other:100]  Out: 2825 [Urine:2825]    CBC:   Recent Labs     09/13/22  1248 09/14/22  0554 09/15/22  0559   WBC 7.7 8.1 7.6   HGB 9.0* 8.2* 8.8*    296 291          Recent Labs     09/13/22  1248 09/14/22  0554 09/15/22  0559    140 143   K 4.0 3.6 3.6    104 105   CO2 24 25 27   BUN 37* 36* 33*   CREATININE 3.1* 3.0* 2.6*   GLUCOSE 190* 108* 110*       Lab Results   Component Value Date    CALCIUM 8.1 (L) 09/15/2022    PHOS 3.1 09/13/2022 Objective:     Vitals: BP (!) 166/67   Pulse 70   Temp 97.9 °F (36.6 °C) (Oral)   Resp 17   Ht 5' 8\" (1.727 m)   Wt 210 lb 15.4 oz (95.7 kg)   SpO2 98%   BMI 32.08 kg/m² ,    General appearance: Alert and oriented  HEENT: At least 2+ conjunctival pallor  Neck: Supple  Lungs: No crackles on anterior exam  Heart: Regular rate and rhythm  Abdomen: Soft, suprapubic catheter in place  Extremities: Positive leg edema      Problem List :         Impression :     Acute kidney injury with underlying CKD stage III-nonoliguric-recovering by creatinine criteria  Genitourinary sepsis status post stent exchange-clinically recovering  Fluid overload but-doing good diuresis himself-as he is recovering from tubular injury  Underlying diabetes, prostate cancer and anemia    Recommendation/Plan  :     Hopefully will continue to recover  Watch for iatrogenic and nosocomial complication  No diuretics for now  He is on multiple antimicrobial agent  Good glycemic control  Follow clinically and biochemically      Shanna Huynh MD MD

## 2022-09-16 LAB
ALBUMIN SERPL-MCNC: 3 GM/DL (ref 3.4–5)
ALP BLD-CCNC: 107 IU/L (ref 40–128)
ALT SERPL-CCNC: 11 U/L (ref 10–40)
ANION GAP SERPL CALCULATED.3IONS-SCNC: 12 MMOL/L (ref 4–16)
AST SERPL-CCNC: 27 IU/L (ref 15–37)
BASOPHILS ABSOLUTE: 0.1 K/CU MM
BASOPHILS RELATIVE PERCENT: 0.9 % (ref 0–1)
BILIRUB SERPL-MCNC: 0.3 MG/DL (ref 0–1)
BUN BLDV-MCNC: 29 MG/DL (ref 6–23)
CALCIUM SERPL-MCNC: 8.5 MG/DL (ref 8.3–10.6)
CHLORIDE BLD-SCNC: 101 MMOL/L (ref 99–110)
CO2: 29 MMOL/L (ref 21–32)
CREAT SERPL-MCNC: 2.6 MG/DL (ref 0.9–1.3)
CULTURE: NORMAL
DIFFERENTIAL TYPE: ABNORMAL
EOSINOPHILS ABSOLUTE: 0.6 K/CU MM
EOSINOPHILS RELATIVE PERCENT: 6.9 % (ref 0–3)
GFR AFRICAN AMERICAN: 29 ML/MIN/1.73M2
GFR NON-AFRICAN AMERICAN: 24 ML/MIN/1.73M2
GLUCOSE BLD-MCNC: 129 MG/DL (ref 70–99)
GLUCOSE BLD-MCNC: 132 MG/DL (ref 70–99)
GLUCOSE BLD-MCNC: 133 MG/DL (ref 70–99)
GLUCOSE BLD-MCNC: 151 MG/DL (ref 70–99)
GLUCOSE BLD-MCNC: 205 MG/DL (ref 70–99)
GLUCOSE BLD-MCNC: 60 MG/DL (ref 70–99)
GLUCOSE BLD-MCNC: 66 MG/DL (ref 70–99)
GLUCOSE BLD-MCNC: 91 MG/DL (ref 70–99)
GLUCOSE BLD-MCNC: 99 MG/DL (ref 70–99)
HCT VFR BLD CALC: 29.3 % (ref 42–52)
HEMOGLOBIN: 9.1 GM/DL (ref 13.5–18)
IMMATURE NEUTROPHIL %: 0.6 % (ref 0–0.43)
LYMPHOCYTES ABSOLUTE: 1.7 K/CU MM
LYMPHOCYTES RELATIVE PERCENT: 19.2 % (ref 24–44)
Lab: NORMAL
MAGNESIUM: 2 MG/DL (ref 1.8–2.4)
MCH RBC QN AUTO: 29.5 PG (ref 27–31)
MCHC RBC AUTO-ENTMCNC: 31.1 % (ref 32–36)
MCV RBC AUTO: 95.1 FL (ref 78–100)
MONOCYTES ABSOLUTE: 0.7 K/CU MM
MONOCYTES RELATIVE PERCENT: 7.8 % (ref 0–4)
NUCLEATED RBC %: 0 %
PDW BLD-RTO: 15.3 % (ref 11.7–14.9)
PLATELET # BLD: 373 K/CU MM (ref 140–440)
PMV BLD AUTO: 10.6 FL (ref 7.5–11.1)
POTASSIUM SERPL-SCNC: 3.9 MMOL/L (ref 3.5–5.1)
RBC # BLD: 3.08 M/CU MM (ref 4.6–6.2)
SEGMENTED NEUTROPHILS ABSOLUTE COUNT: 5.6 K/CU MM
SEGMENTED NEUTROPHILS RELATIVE PERCENT: 64.6 % (ref 36–66)
SODIUM BLD-SCNC: 142 MMOL/L (ref 135–145)
SPECIMEN: NORMAL
TOTAL IMMATURE NEUTOROPHIL: 0.05 K/CU MM
TOTAL NUCLEATED RBC: 0 K/CU MM
TOTAL PROTEIN: 6.2 GM/DL (ref 6.4–8.2)
WBC # BLD: 8.7 K/CU MM (ref 4–10.5)

## 2022-09-16 PROCEDURE — 97535 SELF CARE MNGMENT TRAINING: CPT

## 2022-09-16 PROCEDURE — 2580000003 HC RX 258: Performed by: STUDENT IN AN ORGANIZED HEALTH CARE EDUCATION/TRAINING PROGRAM

## 2022-09-16 PROCEDURE — 1200000000 HC SEMI PRIVATE

## 2022-09-16 PROCEDURE — 2580000003 HC RX 258: Performed by: NURSE PRACTITIONER

## 2022-09-16 PROCEDURE — 94761 N-INVAS EAR/PLS OXIMETRY MLT: CPT

## 2022-09-16 PROCEDURE — 6360000002 HC RX W HCPCS: Performed by: NURSE PRACTITIONER

## 2022-09-16 PROCEDURE — 6370000000 HC RX 637 (ALT 250 FOR IP): Performed by: SPECIALIST

## 2022-09-16 PROCEDURE — 83735 ASSAY OF MAGNESIUM: CPT

## 2022-09-16 PROCEDURE — 99232 SBSQ HOSP IP/OBS MODERATE 35: CPT | Performed by: NURSE PRACTITIONER

## 2022-09-16 PROCEDURE — 97530 THERAPEUTIC ACTIVITIES: CPT

## 2022-09-16 PROCEDURE — 36415 COLL VENOUS BLD VENIPUNCTURE: CPT

## 2022-09-16 PROCEDURE — 6370000000 HC RX 637 (ALT 250 FOR IP): Performed by: NURSE PRACTITIONER

## 2022-09-16 PROCEDURE — 85025 COMPLETE CBC W/AUTO DIFF WBC: CPT

## 2022-09-16 PROCEDURE — 6360000002 HC RX W HCPCS: Performed by: INTERNAL MEDICINE

## 2022-09-16 PROCEDURE — 94150 VITAL CAPACITY TEST: CPT

## 2022-09-16 PROCEDURE — 80048 BASIC METABOLIC PNL TOTAL CA: CPT

## 2022-09-16 PROCEDURE — 82962 GLUCOSE BLOOD TEST: CPT

## 2022-09-16 PROCEDURE — 6370000000 HC RX 637 (ALT 250 FOR IP): Performed by: INTERNAL MEDICINE

## 2022-09-16 PROCEDURE — 80053 COMPREHEN METABOLIC PANEL: CPT

## 2022-09-16 PROCEDURE — 2580000003 HC RX 258: Performed by: INTERNAL MEDICINE

## 2022-09-16 RX ORDER — INSULIN GLARGINE 100 [IU]/ML
10 INJECTION, SOLUTION SUBCUTANEOUS NIGHTLY
Status: DISCONTINUED | OUTPATIENT
Start: 2022-09-16 | End: 2022-09-21 | Stop reason: HOSPADM

## 2022-09-16 RX ADMIN — Medication: at 21:31

## 2022-09-16 RX ADMIN — ATORVASTATIN CALCIUM 80 MG: 40 TABLET, FILM COATED ORAL at 21:31

## 2022-09-16 RX ADMIN — FERROUS SULFATE TAB 325 MG (65 MG ELEMENTAL FE) 325 MG: 325 (65 FE) TAB at 08:48

## 2022-09-16 RX ADMIN — Medication: at 08:48

## 2022-09-16 RX ADMIN — ASPIRIN 81 MG CHEWABLE TABLET 81 MG: 81 TABLET CHEWABLE at 08:48

## 2022-09-16 RX ADMIN — FERROUS SULFATE TAB 325 MG (65 MG ELEMENTAL FE) 325 MG: 325 (65 FE) TAB at 17:12

## 2022-09-16 RX ADMIN — INSULIN LISPRO 2 UNITS: 100 INJECTION, SOLUTION INTRAVENOUS; SUBCUTANEOUS at 16:56

## 2022-09-16 RX ADMIN — Medication: at 15:17

## 2022-09-16 RX ADMIN — CARVEDILOL 6.25 MG: 6.25 TABLET, FILM COATED ORAL at 08:48

## 2022-09-16 RX ADMIN — AMLODIPINE BESYLATE 10 MG: 10 TABLET ORAL at 08:48

## 2022-09-16 RX ADMIN — CEFIDEROCOL SULFATE TOSYLATE 1000 MG: 1 INJECTION, POWDER, FOR SOLUTION INTRAVENOUS at 06:31

## 2022-09-16 RX ADMIN — SODIUM CHLORIDE, PRESERVATIVE FREE 10 ML: 5 INJECTION INTRAVENOUS at 21:33

## 2022-09-16 RX ADMIN — Medication 10 MG: at 21:31

## 2022-09-16 RX ADMIN — LEVOTHYROXINE SODIUM 75 MCG: 75 TABLET ORAL at 06:31

## 2022-09-16 RX ADMIN — CEFIDEROCOL SULFATE TOSYLATE 1000 MG: 1 INJECTION, POWDER, FOR SOLUTION INTRAVENOUS at 21:46

## 2022-09-16 RX ADMIN — CHLORHEXIDINE GLUCONATE: 4 LIQUID TOPICAL at 14:11

## 2022-09-16 RX ADMIN — DEXTROSE MONOHYDRATE 100 MG: 50 INJECTION, SOLUTION INTRAVENOUS at 13:53

## 2022-09-16 RX ADMIN — PANTOPRAZOLE SODIUM 40 MG: 40 TABLET, DELAYED RELEASE ORAL at 06:31

## 2022-09-16 RX ADMIN — CARVEDILOL 6.25 MG: 6.25 TABLET, FILM COATED ORAL at 17:12

## 2022-09-16 NOTE — PROGRESS NOTES
V2.0  [facility] Hospitalist Progress Note      Name:  Luis Alberto Mims /Age/Sex: 1940  (78 y.o. male)   MRN & CSN:  [id number] & [id number] Encounter Date/Time: 2022 6:54 PM EDT    Location:  [facility]- PCP: Kevin Ramsay MD       Hospital Day: 20    Assessment and Plan:   Luis Alberto Mims is a 78 y.o. male with pmh of prostate cancer, diabetes mellitus, hypertension who admitted with sciatic found out with Complicated UTI (urinary tract infection)    #Candida albicans fungemia  #Candida UTI  -Transthoracic echo inconclusive  - Spoke to ophthalmology, recommended checking visual acuity, per my evaluation, patient's vision is 20/20 bilaterally (with glasses on)  -No growth on catheter tip culture for 36-48h  -Repeat Bcx with no growth at 24h  -RAFFI negative for vegetations  -Negative repeat blood cultures and central line tip culture  -Positive MRSA screen    Plan:  ID following, appreciate recs  Continue Eraxis (started )     #ESBL bacteremia in the setting of UTI  #Complicated UTI  - Urine culture on  growing E. coli resistant to Cipro and cefepime  - Urine culture on  no significant growth  - CT abdomen/pelvis showing left ureteral stent in place, mild bilateral urinary tract dilatation with new right perinephric and periureteral stranding raises concern for UTI    Plan:  Continue cefiderocol  ID following, appreciated recs     #Chronic urinary retention managed with suprapubic catheter and exchanged monthly, last exchange  with a cystoscopy patient had hematuria and patient became lethargic and drowsy with elevated leukocytes discussed with ID started on vancomycin and fluconazole and  obtaining blood cultures and urine cultures urine and blood cultures came back positive for Candida albicans treated as above  # Hematuria s/p cystoscopy and exchange of stent  -Urology now following peripherally, recommended to manually irrigate bladder every 4 hours and as needed clots     #Patient with RUE swelling-Improving  -ultrasound RUE negative for DVT Likely due to volume overload in the setting of PAMELLA    Chronic medical problems:      # Normocytic anemia   - Continue with H&H  - Transfuse if hemoglobin less than 7     # History of prostate cancer s/p prostatectomy 1996    #Hypertension  Plan: continue home meds     # Hypothyroidism   Plan: continue levothyroxine     # Type 2 diabetes  Continue SSI, hypoglycemic protocol diabetic diet      Diet ADULT DIET; Regular; 4 carb choices (60 gm/meal)  ADULT ORAL NUTRITION SUPPLEMENT; Breakfast, Dinner; Diabetic Oral Supplement   DVT Prophylaxis [x] Lovenox, []  Heparin, [] SCDs, [] Ambulation,  [] Eliquis, [] Xarelto  [] Coumadin   Code Status Full Code   Disposition From: Home  Expected Disposition:  SNF  Estimated Date of Discharge: Greater than 2 days  Patient requires continued admission due to candidemia, UTI requiring IV antibiotics   Surrogate Decision Maker/ POA Richard Andee Kras, [place], Hayden Kussmaul. Subjective:     Chief Complaint: Fatigue (General weakness started this am. Family called because pt had changed from baseline. Currently being tx for UTI. Pt is slightly confused, doesn't know the year. He has been N/V. )     Patient feels \"okay\" today. He has no complaints. Denies any changes in vision. Review of Systems:    General: No fever, no chills  Eyes: No blurry vision  Heart: No chest pain, no palpitations  Lungs: No shortness of breath, no cough  Abdomen: No abdominal pain, no nausea, no vomiting, no constipation, no diarrhea  : No frequency, no dysuria, no urgency, no decreased urination  MSK: No lower extremity swelling  Neuro: No confusion, no weakness  Skin: No rashes    Objective:      Intake/Output Summary (Last 24 hours) at 9/16/2022 1422  Last data filed at 9/16/2022 1308  Gross per 24 hour   Intake 50 ml   Output 1675 ml   Net -1625 ml        Vitals:   Vitals:    09/16/22 0830   BP: (!) 150/79   Pulse: 67   Resp: 18 Temp: 97.7 °F (36.5 °C)   SpO2: 100%       Physical Exam:     General: NAD, AAO x3-4  Eyes: EOMI  HENT: Atraumatic  Resp: no respiratory distress  GI: Normal bowel sounds, soft, nondistended, nontender  MSK: Normal ROM, no lower extremity edema, bilateral hand swelling  Skin: Intact, dry, warm, no rashes  Neuro: CN II to XII grossly intact  Psych: Normal mood    Medications:   Medications:    insulin glargine  10 Units SubCUTAneous Nightly    chlorhexidine gluconate   Topical Daily    mupirocin   Nasal TID    insulin lispro  0-8 Units SubCUTAneous 2 times per day    anidulafungin  100 mg IntraVENous Q24H    insulin lispro  0-8 Units SubCUTAneous TID WC    Cefiderocol Sulfate Tosylate  1,000 mg IntraVENous Q8H    lidocaine PF  5 mL IntraDERmal Once    sodium chloride flush  5-40 mL IntraVENous 2 times per day    sodium chloride flush  5-40 mL IntraVENous 2 times per day    meperidine  12.5 mg IntraVENous Once    ferrous sulfate  325 mg Oral BID WC    carvedilol  6.25 mg Oral BID WC    lidocaine  1 patch TransDERmal Daily    amLODIPine  10 mg Oral Daily    aspirin  81 mg Oral Daily    atorvastatin  80 mg Oral Nightly    levothyroxine  75 mcg Oral Daily    melatonin  10 mg Oral Nightly    pantoprazole  40 mg Oral QAM AC    sodium chloride flush  5-40 mL IntraVENous 2 times per day    [Held by provider] enoxaparin  40 mg SubCUTAneous QPM      Infusions:    sodium chloride      sodium chloride      sodium chloride 20 mL/hr at 09/15/22 1057    sodium chloride      dextrose      sodium chloride 25 mL (09/06/22 2327)    dextrose       PRN Meds: sodium chloride, , PRN  bisacodyl, 10 mg, Daily PRN  docusate sodium, 100 mg, BID PRN  polyethylene glycol, 17 g, Daily PRN  sodium chloride, , PRN  sodium chloride flush, 5-40 mL, PRN  sodium chloride, , PRN  sodium chloride flush, 5-40 mL, PRN  sodium chloride, 25 mL, PRN  HYDROmorphone, 0.25 mg, Q5 Min PRN  fentanNYL, 50 mcg, Q5 Min PRN  glucose, 4 tablet, PRN  dextrose bolus, 125 mL, PRN   Or  dextrose bolus, 250 mL, PRN  glucagon (rDNA), 1 mg, PRN  dextrose, , Continuous PRN  guaiFENesin-dextromethorphan, 5 mL, Q4H PRN  magnesium hydroxide, 30 mL, Daily PRN  sodium chloride flush, 5-40 mL, PRN  sodium chloride, , PRN  ondansetron, 4 mg, Q8H PRN   Or  ondansetron, 4 mg, Q6H PRN  aluminum & magnesium hydroxide-simethicone, 30 mL, Q6H PRN  acetaminophen, 650 mg, Q6H PRN   Or  acetaminophen, 650 mg, Q6H PRN  glucose, 4 tablet, PRN  dextrose bolus, 125 mL, PRN   Or  dextrose bolus, 250 mL, PRN  glucagon (rDNA), 1 mg, PRN  dextrose, , Continuous PRN  ipratropium-albuterol, 1 vial, BID PRN      Labs      Recent Results (from the past 24 hour(s))   POCT Glucose    Collection Time: 09/15/22  5:09 PM   Result Value Ref Range    POC Glucose 243 (H) 70 - 99 MG/DL   POCT Glucose    Collection Time: 09/15/22  7:38 PM   Result Value Ref Range    POC Glucose 228 (H) 70 - 99 MG/DL   POCT Glucose    Collection Time: 09/15/22  9:19 PM   Result Value Ref Range    POC Glucose 209 (H) 70 - 99 MG/DL   POCT Glucose    Collection Time: 09/16/22  2:53 AM   Result Value Ref Range    POC Glucose 99 70 - 99 MG/DL   POCT Glucose    Collection Time: 09/16/22  7:49 AM   Result Value Ref Range    POC Glucose 60 (L) 70 - 99 MG/DL   POCT Glucose    Collection Time: 09/16/22  8:31 AM   Result Value Ref Range    POC Glucose 66 (L) 70 - 99 MG/DL   Comprehensive Metabolic Panel    Collection Time: 09/16/22  8:59 AM   Result Value Ref Range    Sodium 142 135 - 145 MMOL/L    Potassium 3.9 3.5 - 5.1 MMOL/L    Chloride 101 99 - 110 mMol/L    CO2 29 21 - 32 MMOL/L    BUN 29 (H) 6 - 23 MG/DL    Creatinine 2.6 (H) 0.9 - 1.3 MG/DL    Glucose 91 70 - 99 MG/DL    Calcium 8.5 8.3 - 10.6 MG/DL    Albumin 3.0 (L) 3.4 - 5.0 GM/DL    Total Protein 6.2 (L) 6.4 - 8.2 GM/DL    Total Bilirubin 0.3 0.0 - 1.0 MG/DL    ALT 11 10 - 40 U/L    AST 27 15 - 37 IU/L    Alkaline Phosphatase 107 40 - 128 IU/L    GFR Non- 24 (L) >60 mL/min/1.73m2    GFR  29 (L) >60 mL/min/1.73m2    Anion Gap 12 4 - 16   CBC with Auto Differential    Collection Time: 09/16/22  9:00 AM   Result Value Ref Range    WBC 8.7 4.0 - 10.5 K/CU MM    RBC 3.08 (L) 4.6 - 6.2 M/CU MM    Hemoglobin 9.1 (L) 13.5 - 18.0 GM/DL    Hematocrit 29.3 (L) 42 - 52 %    MCV 95.1 78 - 100 FL    MCH 29.5 27 - 31 PG    MCHC 31.1 (L) 32.0 - 36.0 %    RDW 15.3 (H) 11.7 - 14.9 %    Platelets 406 870 - 497 K/CU MM    MPV 10.6 7.5 - 11.1 FL    Differential Type AUTOMATED DIFFERENTIAL     Segs Relative 64.6 36 - 66 %    Lymphocytes % 19.2 (L) 24 - 44 %    Monocytes % 7.8 (H) 0 - 4 %    Eosinophils % 6.9 (H) 0 - 3 %    Basophils % 0.9 0 - 1 %    Segs Absolute 5.6 K/CU MM    Lymphocytes Absolute 1.7 K/CU MM    Monocytes Absolute 0.7 K/CU MM    Eosinophils Absolute 0.6 K/CU MM    Basophils Absolute 0.1 K/CU MM    Nucleated RBC % 0.0 %    Total Nucleated RBC 0.0 K/CU MM    Total Immature Neutrophil 0.05 K/CU MM    Immature Neutrophil % 0.6 (H) 0 - 0.43 %   Magnesium    Collection Time: 09/16/22  9:00 AM   Result Value Ref Range    Magnesium 2.0 1.8 - 2.4 mg/dl   POCT Glucose    Collection Time: 09/16/22  9:41 AM   Result Value Ref Range    POC Glucose 132 (H) 70 - 99 MG/DL   POCT Glucose    Collection Time: 09/16/22 11:34 AM   Result Value Ref Range    POC Glucose 151 (H) 70 - 99 MG/DL        Imaging/Diagnostics Last 24 Hours   Echocardiogram complete 2D with doppler with color    Result Date: 9/12/2022  Transthoracic Echocardiography Report (TTE)  Demographics   Patient Name      Kailey MARCIAL     Date of Study       09/12/2022   Date of Birth     1940         Gender              Male   Age               78 year(s)         Race                Black   Patient Number    [id number]         Room Number         2006   Visit Number      [id number]   Corporate ID      [id number]   Accession Number  [id number]         Fernanda Barnes Petrona Kim  Interpreting        Cathie Carbone  Physician         NORA MICHELLE-JOHNNY         Physician           MD  Procedure Type of Study   TTE procedure:ECHOCARDIOGRAM COMPLETE 2D W DOPPLER W COLOR. Procedure Date Date: 09/12/2022 Start: 02:23 PM Study Location: Portable Technical Quality: Adequate visualization Indications:Endocarditis. Patient Status: Routine Height: 68 inches Weight: 210 pounds BSA: 2.09 m2 BMI: 31.93 kg/m2 HR: 68 bpm BP: 141/57 mmHg  Conclusions   Summary  Left ventricular systolic function is normal.  Ejection fraction is visually estimated at 60%. Grade II diastolic dysfunction. Cannot rule out endocarditis on the tricuspid valve. No evidence of any pericardial effusion. Signature   ------------------------------------------------------------------  Electronically signed by Cathie Carbone MD (Interpreting  physician) on 09/12/2022 at 06:43 PM  ------------------------------------------------------------------   Findings   Left Ventricle  Left ventricular systolic function is normal.  Ejection fraction is visually estimated at 60%. No regional wall motion abnormalites. Grade II diastolic dysfunction. Left ventricle size is normal.   Left Atrium  Essentially normal left atrium. Right Atrium  Essentially normal right atrium. Right Ventricle  Essentially normal right ventricle. Aortic Valve  Sclerotic, but non-stenotic aortic valve. No significant valvular disease noted. Mitral Valve  Structurally normal mitral valve. Mild mitral regurgitation. Tricuspid Valve  Tricuspid valve is structurally normal.  Mild tricuspid regurgitation. Cannot rule out endocarditis on the tricuspid valve. Pulmonic Valve  Pulmonic valve is structurally normal.  No significant valvular disease noted. Pericardial Effusion  No evidence of any pericardial effusion. Pleural Effusion  No evidence of pleural effusion.    Miscellaneous IVC and abdominal aorta are within normal limits.   M-Mode/2D Measurements & Calculations   LV Diastolic Dimension:  LV Systolic Dimension:  LA Dimension: 3.7 cmAO Root  3.68 cm                  2.44 cm                 Dimension: 3.3 cmLA Area:  LV FS:33.7 %             LV Volume Diastolic: 62 01.5 cm2  LV PW Diastolic: 1.3 cm  ml  LV PW Systolic: 6.98 cm  LV Volume Systolic: 26  Septum Diastolic: 2.47   ml  cm                       LV EDV/LV EDV Index: 62  Septum Systolic: 0.67 cm MV/77 B8HS ESV/LV ESV   LA/Aorta: 1.12  CO: 4.47 l/min           Index: 26 ml/12 m2      Ascending Aorta: 3.4 cm  CI: 2.14 l/m*m2          EF Calculated (A4C):    LA volume/Index: 27 ml                           58.1 %                  /46A3  LV Area Diastolic: 24    EF Calculated (2D):  cm2                      63.4 %  LV Area Systolic: 24.4  cm2                      LV Length: 7.77 cm                            LVOT: 2.1 cm  Doppler Measurements & Calculations   MV Peak E-Wave: 110     AV Peak Velocity: 150 cm/s  LVOT Peak Velocity: 88  cm/s                    AV Peak Gradient: 9 mmHg    cm/s  MV Peak A-Wave: 89.3    AV Mean Velocity: 107 cm/s  LVOT Mean Velocity: 65  cm/s                    AV Mean Gradient: 5 mmHg    cm/s  MV E/A Ratio: 1.23      AV VTI: 31.9 cm             LVOT Peak Gradient: 3  MV Peak Gradient: 4.84  AV Area (Continuity):2.06   mmHgLVOT Mean Gradient:  mmHg                    cm2                         2 mmHg  MV Mean Gradient: 4                                 Estimated RVSP: 35 mmHg  mmHg                    LVOT VTI: 19 cm             Estimated RAP:3 mmHg  MV Mean Velocity: 90.2  cm/s                    Estimated PASP: 35.49 mmHg  MV Deceleration Time:                               TR Velocity:285 cm/s  203 msec                                            TR Gradient:32.49 mmHg   MV Area (continuity):  1.67 cm2  MV E' Septal Velocity:  8.33 cm/s  MV A' Septal Velocity:  7.13 cm/s  MV E' Lateral Velocity:  8.11 cm/s  MV A' Lateral Velocity:  9.32 cm/s  MV E/E' septal: 13.21  MV E/E' lateral: 13.56        Electronically signed by Marino Parrish MD on 9/16/2022 at 2:22 PM

## 2022-09-16 NOTE — PROGRESS NOTES
[facility] [address]   Progress Note  [facility] 0 1 2      Date: 2022   Patient: Michelet Woodruff   : 1940   DOA: 2022   MRN: [id number]   ROOM#: [id number]     Admit Date: 2022     Collaborating Urologist on Call at time of admission: Dr. Mary Liu  CC: Fatigue  Reason for Consult: Hematuria with acute blood loss anemia  POD #9: Cystoscopy, left ureteral stent exchange, suprapubic tube exchange. Subjective:     Pain: mild, nausea and no vomiting,   Bowel Movement/Flatus: Yes  Voiding: SPT in place, urine pink-tinged    Pt resting in bed, states he is feeling OK today. Objective:    Vitals:    BP (!) 150/79   Pulse 67   Temp 97.7 °F (36.5 °C) (Oral)   Resp 18   Ht 5' 8\" (1.727 m)   Wt 207 lb 7.3 oz (94.1 kg)   SpO2 100%   BMI 31.54 kg/m²    Temp  Av °F (36.7 °C)  Min: 97.7 °F (36.5 °C)  Max: 98.6 °F (37 °C)     Intake/Output Summary (Last 24 hours) at 2022 0905  Last data filed at 2022 0631  Gross per 24 hour   Intake --   Output 1325 ml   Net -1325 ml       Physical Exam:  General appearance: alert, appears stated age, cooperative, fatigued, no distress, mildly obese and slowed mentation  Head: Normocephalic, without obvious abnormality, atraumatic  Back: No CVA tenderness  Abdomen: Soft, non-tender, non-distended.  SPT in place, urine pink-tinged    Labs:   WBC:    Lab Results   Component Value Date/Time    WBC 7.6 09/15/2022 05:59 AM      Hemoglobin/Hematocrit:    Lab Results   Component Value Date/Time    HGB 8.8 09/15/2022 05:59 AM    HCT 27.7 09/15/2022 05:59 AM      BMP:   Lab Results   Component Value Date/Time     09/15/2022 05:59 AM    K 3.6 09/15/2022 05:59 AM    K 3.9 2018 04:42 AM     09/15/2022 05:59 AM    CO2 27 09/15/2022 05:59 AM    BUN 33 09/15/2022 05:59 AM    LABALBU 2.6 09/15/2022 05:59 AM    CREATININE 2.6 09/15/2022 05:59 AM    CALCIUM 8.1 09/15/2022 05:59 AM    GFRAA 29 09/15/2022 05:59 AM    LABGLOM 24 09/15/2022 05:59 AM      PT/INR:    Lab Results   Component Value Date/Time    PROTIME 14.2 09/12/2022 01:24 PM    PROTIME 15.2 01/24/2011 06:48 PM    INR 1.10 09/12/2022 01:24 PM      PTT:    Lab Results   Component Value Date/Time    APTT 37.6 09/12/2022 01:24 PM     Blood Culture: 2/4 bottles +Candida  Urine Culture:  Candida Albicans <10K CFU/ml    Imaging:   CT ABDOMEN PELVIS WO CONTRAST Additional Contrast? None    Result Date: 9/10/2022  EXAMINATION: CT OF THE ABDOMEN AND PELVIS WITHOUT CONTRAST 9/10/2022 4:35 pm TECHNIQUE: CT of the abdomen and pelvis was performed without the administration of intravenous contrast. Multiplanar reformatted images are provided for review. Automated exposure control, iterative reconstruction, and/or weight based adjustment of the mA/kV was utilized to reduce the radiation dose to as low as reasonably achievable. COMPARISON: CT abdomen and pelvis 09/01/2022 and 04/18/2022 HISTORY: ORDERING SYSTEM PROVIDED HISTORY: Recent stent placement/SPT exchange. Worsening fevers/leukocytosis. FINDINGS: Lower Chest: Trace left pleural effusion with left basilar relaxation atelectasis or infiltrate. Minimal subsegmental atelectasis posterior right lung base. Visualized portions of the cardiac and posterior mediastinal structures appear unremarkable with exception of a small hiatal hernia. Organs: Calcifications liver and spleen reflect sequela of old granulomatous disease (benign finding requiring no additional evaluation or follow-up). Normal attenuation throughout the liver. No discrete hepatic lesion or intrahepatic bile duct dilatation is seen. The gallbladder, RIGHT kidney, spleen, adrenal glands and pancreas appear unremarkable. Relative high density within the left urinary collecting system, mild to moderate severity hydronephrosis with double-J ureter stent in unremarkable position. No calculus evident along the course of the stent.   Prominent left perinephric stranding GI/Bowel: Small hiatal hernia. The intra-abdominal stomach and small bowel appear unremarkable. No diffuse or focal small bowel wall thickening or inflammatory changes evident. No obstruction is seen. The appendix is visualized right lower quadrant, unremarkable in appearance. The colon appears unremarkable. Pelvis: Urinary bladder decompressed by suprapubic catheter. Distal portion of left double-J ureter stent appears unremarkable in position. Surgical clips at the expected location of the prostate gland presumably status post prostatectomy. Trace simple appearing pelvic ascites posteriorly. No pneumoperitoneum. Vascular calcifications are noted reflecting calcific atherosclerosis. Peritoneum/Retroperitoneum: Perinephric fatty stranding on the left. Trace simple appearing left pericolic gutter ascites. No pneumoperitoneum. Calcific atherosclerosis aorta and branches without aneurysm. Inferior vena cava appears unremarkable. No adenopathy is seen. Bones/Soft Tissues: No acute superficial soft tissue or osseous structure abnormality evident. No suspicious lytic or blastic lesion evident. 1. Probable hematuria left urinary collecting system (possible active hemorrhage into the left urinary collecting system) and mild-to-moderate left hydronephrosis. 2. The double-J stent appears in unremarkable position without left ureter calculus evident. 3. Trace abdominopelvic ascites is probably reactive. 4.  Vascular calcifications are noted reflecting calcific atherosclerosis. 5. Trace left pleural effusion with left basilar relaxation atelectasis or infiltrate. 6. Minimal subsegmental atelectasis posterior right lung base. 7. Small hiatal hernia.      CT ABDOMEN PELVIS WO CONTRAST Additional Contrast? None    Result Date: 9/1/2022  EXAMINATION: CT OF THE ABDOMEN AND PELVIS WITHOUT CONTRAST 9/1/2022 3:06 pm TECHNIQUE: CT of the abdomen and pelvis was performed without the administration of intravenous contrast. Multiplanar reformatted images are provided for review. Automated exposure control, iterative reconstruction, and/or weight based adjustment of the mA/kV was utilized to reduce the radiation dose to as low as reasonably achievable. COMPARISON: 04/18/2022. HISTORY: ORDERING SYSTEM PROVIDED HISTORY: abdominal pain TECHNOLOGIST PROVIDED HISTORY: Reason for exam:->abdominal pain Additional Contrast?->None Reason for Exam: abdominal pain, POSSIBLE UTI FINDINGS: Lower Chest: The visualized lung bases demonstrate subsegmental atelectasis/scarring. Trace pericardial fluid. Questionable trace bilateral pleural fluid. Atherosclerosis in the visualized thoracic aorta. Coronary artery calcifications. Small hiatal hernia. Liver: Normal. Gallbladder and Bile Ducts: Normal. Spleen: Normal. Adrenal Glands: Normal. Pancreas: Normal. Genitourinary: Left ureteral stent with minimal dilatation of the left urinary tract. Mild dilatation of the right urinary tract. Mild dilatation of the right urinary tract with increased right perinephric and periureteral stranding. No definite urinary stones. Suprapubic catheter in the decompressed urinary bladder. Bowel: Normal caliber bowel. Normal appendix. No significant diverticular disease. Vasculature: Atherosclerosis. Normal caliber abdominal aorta. Bones and Soft Tissues: Fat containing right inguinal hernia. Degenerative changes in the visualized spine and pelvis. Retroperitoneum/Mesentery: No intraperitoneal free air, ascites or fluid collection. No lymphadenopathy in the abdomen or pelvis. Stranding/fluid throughout the retroperitoneum. 1.  Left ureteral stent in place. Mild bilateral urinary tract dilatation with new right perinephric and periureteral stranding raises concern for urinary tract infection. No urinary stones. 2.  Suprapubic catheter in the decompressed urinary bladder.  3.  Ill-defined stranding/fluid throughout the retroperitoneum may relate to vascular congestion or reactive changes. No intraperitoneal free air or abscess. XR ABDOMEN (KUB) (SINGLE AP VIEW)    Result Date: 9/8/2022  EXAMINATION: ONE SUPINE XRAY VIEW(S) OF THE ABDOMEN 9/8/2022 7:35 pm COMPARISON: 09/01/2022 HISTORY: ORDERING SYSTEM PROVIDED HISTORY: evaluate for ileus TECHNOLOGIST PROVIDED HISTORY: Reason for exam:->evaluate for ileus Reason for Exam: evaluate for ileus Additional signs and symptoms: nausea vomiting Relevant Medical/Surgical History: UTI FINDINGS: Lung bases are grossly clear. Dilated gas-filled loops bowel are nonspecific and can represent mild ileus versus partial obstruction. Left-sided double-J ureteral stent partially visualized and evaluated. Hopper catheter identified. Surgical identified within the pelvis noted. There are few scattered pelvic phleboliths. Prominent loops of bowel may represent persistent ileus or partial obstruction. FL LESS THAN 1 HOUR    Result Date: 9/7/2022  Radiology exam is complete. No Radiologist dictation. Please follow up with ordering provider. XR CHEST PORTABLE    Result Date: 9/10/2022  EXAMINATION: ONE XRAY VIEW OF THE CHEST 9/9/2022 10:20 pm COMPARISON: 09/09/2022 at 1018 hours HISTORY: ORDERING SYSTEM PROVIDED HISTORY: Verify left PICC placement TECHNOLOGIST PROVIDED HISTORY: Reason for exam:->Verify left PICC placement Reason for Exam: Verify left PICC placement FINDINGS: Left-sided PICC terminates within the region the confluence of the SVC. The cardiomediastinal silhouette is mildly prominent size. Mild perihilar congestion/interstitial edema. The lungs are clear. No pleural effusion or pneumothorax is present. Satisfactory position of the PICC.   No acute cardiopulmonary process     XR CHEST PORTABLE    Result Date: 9/9/2022  EXAMINATION: ONE XRAY VIEW OF THE CHEST 9/9/2022 10:12 am COMPARISON: 09/06/2022 HISTORY: ORDERING SYSTEM PROVIDED HISTORY: fever TECHNOLOGIST PROVIDED HISTORY: Reason for exam:->fever Reason for Exam: fever FINDINGS: The lungs are without acute focal process. There is no effusion or pneumothorax. The cardiomediastinal silhouette is without acute process. The osseous structures are without acute process. No acute process. XR CHEST PORTABLE    Result Date: 9/6/2022  EXAMINATION: ONE XRAY VIEW OF THE CHEST 9/6/2022 1:57 pm COMPARISON: 08/28/2022 HISTORY: ORDERING SYSTEM PROVIDED HISTORY: cough TECHNOLOGIST PROVIDED HISTORY: Reason for exam:->cough Reason for Exam: cough FINDINGS: The lungs appear clear. There are no pulmonary nodules, masses or infiltrates. There are no pleural effusions and there is no evidence of pneumothorax. The heart and mediastinal structures appear normal.  Bony structures appear unremarkable. No abnormalities noted. XR CHEST PORTABLE    Result Date: 8/28/2022  EXAMINATION: ONE XRAY VIEW OF THE CHEST 8/28/2022 3:37 pm COMPARISON: 04/18/2022 HISTORY: ORDERING SYSTEM PROVIDED HISTORY: emergency TECHNOLOGIST PROVIDED HISTORY: Reason for exam:->emergency Reason for Exam: fatigue, emergency, fever FINDINGS: Increased interstitial opacities seen throughout both lungs. A focal infiltrate is not identified. The cardial pericardial silhouette is unremarkable. No pneumothorax is seen. No free air. No acute bony abnormality. Increased interstitial opacity. While much or all of this may be chronic, please correlate with any clinical evidence of acute interstitial edema. XR CHEST 1 VIEW    Result Date: 9/10/2022  EXAMINATION: ONE XRAY VIEW OF THE CHEST 9/10/2022 12:11 am COMPARISON: Chest radiograph dated September 9, 2022 HISTORY: ORDERING SYSTEM PROVIDED HISTORY: recent CVL insertion TECHNOLOGIST PROVIDED HISTORY: Reason for exam:->recent CVL insertion Reason for Exam: recent CVL insertion FINDINGS: There has been interval placement of a right IJ line with the tip overlying the distal SVC/right atrial region. The cardiomediastinal silhouette is stable. The previously noted left-sided PICC line has been removed. Bibasilar atelectasis with low lung volumes are noted. There is no significant change. 1. Right IJ line with the tip overlying the distal SVC/right atrial region. 2. Low lung volumes with bibasilar atelectasis. No significant change. US ABDOMEN LIMITED Specify organ? LIVER, GALLBLADDER    Result Date: 9/3/2022  EXAMINATION: RIGHT UPPER QUADRANT ULTRASOUND 9/3/2022 4:43 am COMPARISON: CT 09/01/2022, ultrasound 05/06/2020 HISTORY: ORDERING SYSTEM PROVIDED HISTORY: abdominal pain TECHNOLOGIST PROVIDED HISTORY: Reason for exam:->abdominal pain Specify organ?->LIVER Specify organ?->GALLBLADDER FINDINGS: LIVER:  The liver demonstrates normal echogenicity without evidence of intrahepatic biliary ductal dilatation. BILIARY SYSTEM:  Gallbladder is unremarkable without evidence of pericholecystic fluid, wall thickening or stones. Negative sonographic Lima's sign. Common bile duct is within normal limits measuring 3 mm. RIGHT KIDNEY: The right kidney is grossly unremarkable without evidence of hydronephrosis. PANCREAS:  Visualized portions of the pancreas are unremarkable. OTHER: No evidence of right upper quadrant ascites. Unremarkable right upper quadrant ultrasound. VL DUP UPPER EXTREMITY VENOUS RIGHT    Result Date: 9/9/2022  EXAMINATION: DUPLEX ULTRASOUND OF THE RIGHT UPPER EXTREMITY FOR DVT, 9/9/2022 11:12 am TECHNIQUE: Duplex ultrasound using B-mode/gray scaled imaging and Doppler spectral analysis and color flow was obtained of the deep venous structures of the upper right extremity. COMPARISON: None. HISTORY: ORDERING SYSTEM PROVIDED HISTORY: swollen right upper extremity TECHNOLOGIST PROVIDED HISTORY: Please do the doppler ultrasound of right upper extremity to r/o DVT Reason for exam:->swollen right upper extremity FINDINGS: There is normal flow and compressibility of the visualized venous structures.  There is no evidence of echogenic thrombus. The veins demonstrate good compressibility with normal color flow study and spectral analysis. The exam is suboptimal.  The brachial and basilic veins are not well visualized due to Tegaderm     No evidence of DVT within the right upper extremity. Assessment & Plan:      Natalie Greer is a 78 y.o. male admitted 3/42/2255 for metabolic encephalopathy. 1) Chronic Urinary Retention: managed with suprapubic catheter and exchanged monthly, last exchanged 9/7/22. Recommend SPT exchanges q3 weeks. 2) Chronic Left Hydronephrosis managed with chronic stent: S/p cystoscopy, left ureteral stent exchange, suprapubic tube exchange 9/7/2022  Gross hematuria resolved. Recommend left ureteral stent exchanges q3 months. 3) Sepsis w Complicated UTI:              Repeat urine culture < 10 candida  Repeat blood culture- 2/4 yeast              Prior Blood cultures 4/4 +ESBL E.coli              WBC 7.6, afebrile              On Fetroja and fluconazole, per ID       Recommend irrigate SP w saline 100 ml daily. 4) H/o Prostate Cancer: S/p RPP with Dr. Curtis Mejía in [address] [place] [address] PSA 0.93 2/2021. On Eligard q6 months  5) PAMELLA: Nephrology following    Pt stable from a  standpoint. Will sign off, please call with any questions. Pt to follow up in our office in 2wks for SPT exchange. Patient seen and examined, chart reviewed.      Electronically signed by Zora Bryan PA-C on 9/16/2022 at 9:05 AM

## 2022-09-16 NOTE — PROGRESS NOTES
Progress Note( Dr. Bryce Huerta)  9/15/2022  Subjective:   Admit Date: 8/28/2022  PCP: Berny Denson MD    Admitted For :Fatigue and possible sepsis from UTI    Consulted For: Control of blood glucose as patient has having hypoglycemic episodes    Interval History: feeling Somewhat  tired  had  no more episode of low blood glucose yesterday  Patient septic with fungemia . in addition to Bbacteremia      Pt had Following Urology procedure done early morng hour spf 9/8/2022  Cystoscopy, left ureteral stent exchange,  suprapubic tube exchange. Patient had RAFFI done yesterday and was negative for bacterial endocarditis with a vegetation    Denies any chest pains,   Denies SOB . Denies nausea or vomiting. now eating better  No new bowel or bladder symptoms.        Intake/Output Summary (Last 24 hours) at 9/15/2022 2223  Last data filed at 9/15/2022 1909  Gross per 24 hour   Intake 240 ml   Output 2275 ml   Net -2035 ml         DATA    CBC:   Recent Labs     09/13/22  1248 09/14/22  0554 09/15/22  0559   WBC 7.7 8.1 7.6   HGB 9.0* 8.2* 8.8*    296 291      CMP:  Recent Labs     09/13/22  1248 09/14/22  0554 09/15/22  0559    140 143   K 4.0 3.6 3.6    104 105   CO2 24 25 27   BUN 37* 36* 33*   CREATININE 3.1* 3.0* 2.6*   CALCIUM 7.8* 7.9* 8.1*   PROT  --  5.0* 5.2*   LABALBU  --  2.4* 2.6*   BILITOT  --  0.2 0.2   ALKPHOS  --  93 96   AST  --  19 25   ALT  --  7* 9*       Lipids: No results found for: CHOL, HDL, TRIG  Glucose:  Recent Labs     09/15/22  1709 09/15/22  1938/22  2119   POCGLU 243* 228* 209*       KlauwuzcyeT9C:  Lab Results   Component Value Date/Time    LABA1C 7.8 08/29/2022 01:30 AM     High Sensitivity TSH:   Lab Results   Component Value Date/Time    TSHHS 7.590 04/28/2022 06:59 AM     Free T3: No results found for: FT3  Free T4:  Lab Results   Component Value Date/Time    T4FREE 1.15 03/27/2022 08:22 AM       CT ABDOMEN PELVIS WO CONTRAST Additional Contrast? None   Final Result   1. Probable hematuria left urinary collecting system (possible active   hemorrhage into the left urinary collecting system) and mild-to-moderate left   hydronephrosis. 2. The double-J stent appears in unremarkable position without left ureter   calculus evident. 3. Trace abdominopelvic ascites is probably reactive. 4.  Vascular calcifications are noted reflecting calcific atherosclerosis. 5. Trace left pleural effusion with left basilar relaxation atelectasis or   infiltrate. 6. Minimal subsegmental atelectasis posterior right lung base. 7. Small hiatal hernia. XR CHEST 1 VIEW   Final Result   1. Right IJ line with the tip overlying the distal SVC/right atrial region. 2. Low lung volumes with bibasilar atelectasis. No significant change. XR CHEST PORTABLE   Final Result   Satisfactory position of the PICC. No acute cardiopulmonary process         VL DUP UPPER EXTREMITY VENOUS RIGHT   Final Result   No evidence of DVT within the right upper extremity. XR CHEST PORTABLE   Final Result   No acute process. XR ABDOMEN (KUB) (SINGLE AP VIEW)   Final Result   Prominent loops of bowel may represent persistent ileus or partial   obstruction. FL LESS THAN 1 HOUR   Final Result      XR CHEST PORTABLE   Final Result   No abnormalities noted. US ABDOMEN LIMITED Specify organ? LIVER, GALLBLADDER   Final Result   Unremarkable right upper quadrant ultrasound. CT ABDOMEN PELVIS WO CONTRAST Additional Contrast? None   Final Result   1. Left ureteral stent in place. Mild bilateral urinary tract dilatation   with new right perinephric and periureteral stranding raises concern for   urinary tract infection. No urinary stones. 2.  Suprapubic catheter in the decompressed urinary bladder. 3.  Ill-defined stranding/fluid throughout the retroperitoneum may relate to   vascular congestion or reactive changes.   No intraperitoneal free air or abscess. XR CHEST PORTABLE   Final Result   Increased interstitial opacity. While much or all of this may be chronic,   please correlate with any clinical evidence of acute interstitial edema.               Scheduled Medicines   Medications:    chlorhexidine gluconate   Topical Daily    mupirocin   Nasal TID    insulin glargine  15 Units SubCUTAneous Nightly    insulin lispro  0-8 Units SubCUTAneous 2 times per day    anidulafungin  100 mg IntraVENous Q24H    insulin lispro  0-8 Units SubCUTAneous TID WC    Cefiderocol Sulfate Tosylate  1,000 mg IntraVENous Q8H    lidocaine PF  5 mL IntraDERmal Once    sodium chloride flush  5-40 mL IntraVENous 2 times per day    sodium chloride flush  5-40 mL IntraVENous 2 times per day    meperidine  12.5 mg IntraVENous Once    ferrous sulfate  325 mg Oral BID WC    carvedilol  6.25 mg Oral BID WC    lidocaine  1 patch TransDERmal Daily    amLODIPine  10 mg Oral Daily    aspirin  81 mg Oral Daily    atorvastatin  80 mg Oral Nightly    levothyroxine  75 mcg Oral Daily    melatonin  10 mg Oral Nightly    pantoprazole  40 mg Oral QAM AC    sodium chloride flush  5-40 mL IntraVENous 2 times per day    [Held by provider] enoxaparin  40 mg SubCUTAneous QPM      Infusions:    sodium chloride      sodium chloride      sodium chloride 20 mL/hr at 09/15/22 1057    sodium chloride      dextrose      sodium chloride 25 mL (09/06/22 2327)    dextrose           Objective:   Vitals: BP (!) 167/60   Pulse 69   Temp 97.8 °F (36.6 °C) (Oral)   Resp 15   Ht 5' 8\" (1.727 m)   Wt 210 lb 15.4 oz (95.7 kg)   SpO2 98%   BMI 32.08 kg/m²   General appearance: alert and cooperative with exam  Neck: no JVD or bruit  Thyroid : Normal lobes   Lungs: Has Vesicular Breath sounds   Heart:  regular rate and rhythm  Abdomen: soft, non-tender; bowel sounds normal; no masses,  no organomegaly has suprapubic catheter   Musculoskeletal: Normal  Extremities: extremities normal, , no edema  Neurologic: Awake, alert, oriented to name, place and time. Cranial nerves II-XII are grossly intact. Motor weakness. Sensory neuropathy. ,  and gait is abnormal.  Stable    Assessment:     Patient Active Problem List:     Gait disturbance     Generalized weakness     Diabetes mellitus (HCC)     Osteoarthritis     Anemia of chronic disorder     Infected implanted bladder sphincter cuff     Escherichia coli urinary tract infection     Hypertension     Sepsis ([address]     Acute encephalopathy     Type 2 diabetes mellitus with hypoglycemia without coma (HCC)     UTI (urinary tract infection) due to urinary indwelling catheter ([facility])     Hyperkalemia     Acute kidney failure (HCC)     Leg weakness, bilateral     Type 2 diabetes mellitus with neurological manifestations, uncontrolled ([address]     Complicated UTI (urinary tract infection)     Essential hypertension     Severe muscle deconditioning     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 (HCC)     Frequent falls     Chronic kidney disease, stage 3a ([address]     Uncontrolled type 2 diabetes mellitus with peripheral neuropathy (HCC)     Prostate cancer (HCC)     Suprapubic catheter ([address]     Sepsis due to urinary tract infection ([address]     Coagulase negative Staphylococcus bacteremia     Chronic kidney disease, stage IV (severe) (HCC)     Acute metabolic encephalopathy     SVT (supraventricular tachycardia) (HCC)     Metabolic encephalopathy     History of prostate cancer     Cigarette nicotine dependence without complication     Altered mental status     Serratia marcescens infection     Hypoglycemia         Plan:     Reviewed POC blood glucose . Labs and X ray results   Reviewed Current Medicines   On Correction bolus Humalog/ Basal Lantus ( On Hold )Insulin regime /   Monitor Blood glucose frequently   Modified  the dose of Insulin/ other medicines as neede  Management is working to show the patient to skilled nursing unit possibly [facility] home  Will follow     .      Swathi Beebe MD, MD

## 2022-09-16 NOTE — PROGRESS NOTES
Occupational Therapy    Occupational Therapy Treatment Note    Name: Lakesha Casper MRN: [id number] :   1940   Date:  2022   Admission Date: 2022 Room:  [address] [place] [address]A     Primary Problem: The primary encounter diagnosis was Urinary tract infection associated with cystostomy catheter, initial encounter ([facility] A diagnosis of Septicemia ([facility] [address] was also pertinent to this visit. Restrictions/Precautions:  general precautions, fall risk, contact precautions    Communication with other providers: Co tx with DION Stallworth    Subjective:  Patient states:  Pt agreeable to therapy session  Pain:   Location, Type, Intensity (0/10 to 10/10):  non verbal indicator of pain not present. Objective:    Observation: Pt supine in bed with PCA present. Objective Measures:  Pt alert and oriented. Treatment, including education:  Therapeutic Activity Training:   Therapeutic activity training was instructed today. Cues were given for safety, sequence, UE/LE placement, awareness, and balance. Activities performed today included bed mobility training, sup-sit and sit-stand. Pt completed bed mobility rolling for supine to sit MOD A x 2 with verbal cues for hand placement and technique. Pt completed sitting edge of bed with decreased sitting tolerance this date with fair balance and noted anterior lean with fatigue. Pt completed sit to stand from edge of bed with use of james steady and gait belt donned with MOD A x 2 with limited ability to achieve full upright posture. Pt completed sitting in james steady for several minutes. Pt seated edge of bed and returned supine with MOD A x2 and boosted in bed with MAX A x 2. Self Care Training:   Cues were given for safety, sequence, UE/LE placement, visual cues, and balance. Activities performed today included dressing, toileting, hand hygiene, and grooming.      Pt seated edge of bed with close SBA to CGA  and completed face washing and hair combing with MIN to MOD A.  Pt required increased verbal and tactile cues to facilitate use of RUE. Pt left supine in bed with all needs met and call light in reach. Assessment / Impression:    Patient's tolerance of treatment: fair-  Adverse Reaction: none  Significant change in status and impact:  none  Barriers to improvement: activity tolerance. Goals:  Time frame for goals: 2 weeks  Pt will complete feeding tasks with ind  Pt will complete grooming tasks with min A standing at sink- addressed edge of bed  Pt will complete toileting tasks with mod A using standard commode  Pt will complete UB dressing and bathing tasks with min A  Pt will complete LB dressing and bathing tasks with mod A  Pt will complete therapeutic exercise/activity to increase independence in ADL/IADL function  Pt will practice functional transfers and mobility with AD for increased safety and independence-addressed    Plan for Next Session:    Continue OT POC and progress use of RUE in ADLs.     Time in:  0935  Time out:  1008  Timed treatment minutes:  33  Total treatment time:  33      Electronically signed by:    KHARI Scott,   9/16/2022, 10:14 AM

## 2022-09-16 NOTE — PROGRESS NOTES
Physical Therapy  Name: Darlene Torres MRN: [id number] :   1940   Date:  2022   Admission Date: 2022 Room:  [address] [place], [place] [address]   Restrictions/Precautions:        contact precautions, falls    Communication with other providers:  Appropriate per chart review. Cotx with Krupa Khalil per patient tolerance with rehab and patient safety    Subjective:  Patient states:  Patient is agreeable for rehab. Pain:   Location, Type, Intensity (0/10 to 10/10): Denies    Objective:    Observation:  Patient demo's fair upright posture with rounded forward shoulders, and poor endurance with spinal muscles to maintain upright posture. Treatment, including education/measures:    Transfers with line management of Suprapubic catheter, BP Cuff, Tele Monitor  Rolling: Max A x 1 Lt side. Supine to sit :Mod A x 2 with sequential sequencing with management of BLE off Eob and trunk rise  Seated balance: Initially patient need mod A for steadying, with cues for posture and hand placement for support on EOB, assist improves to CGA. Tolerates ~2' of sitting on EOB before needing max cues and encouragement to continue EOB activities, is able to tolerate ~5' additional time on EOB with encouragement at [facility] for steadying support  Sit to stand: Mod A x 2, inside bariatric james steady. Partial stand achieved with EOB elevated and cues for sequencing and hand placement for effort. X 2 sets  Stand to sit: inside bariatric steady and to EOB Min A x 2 for proper hip guidance  ADL training: Mod A for to guide BUE for self grooming. Cues for posture and motion with functional combing motion. Sit to supine :Min A x 2 for BLE assist onto EOB and for trunk guidance   Scooting :Seated scooting to EOB, Mod A. Supine scoot to [facility] Max A x 2 with patient using BLE for effort    Safety  Patient left safely in the bed, with call light/phone in reach with alarm applied. Gait belt and mask were used for transfers and gait.     Assessment / Impression:   Patient has fair tolerance with today's activities. He needs max encouragement to tolerate additional activities today. Educated provided on goals for rehab. Patient's tolerance of treatment:  Fair   Adverse Reaction: fatigue  Significant change in status and impact:  none  Barriers to improvement:  medical status    Plan for Next Session:    Will cont to work towards pt's goals per patient tolerance  Time in:  0935  Time out:  1008  Timed treatment minutes: 33  Total treatment time:  33    Previously filed items:  Social/Functional History  Lives With: Son  Home Equipment: Rollator  Short Term Goals  Time Frame for Short term goals: 2 weeks  Short term goal 1: Pt will perform sit><supine maxA  Short term goal 2: Pt will perform static sitting x 5 minutes SBA with BUE support  Short term goal 3: Pt will transfer between surfaces maxA       Electronically signed by:     Luz Ferris PTA  9/16/2022, 8:35 AM

## 2022-09-16 NOTE — PROGRESS NOTES
Progress Note( Dr. Bri Pineda)  9/16/2022  Subjective:   Admit Date: 8/28/2022  PCP: Maite Murray MD    Admitted For :Fatigue and possible sepsis from UTI    Consulted For: Control of blood glucose as patient has having hypoglycemic episodes    Interval History: feeling Somewhat  tired  had  no more episode of low blood glucose yesterday  Patient septic with fungemia . in addition to Bbacteremia      Pt had Following Urology procedure done early morng hour spf 9/8/2022  Cystoscopy, left ureteral stent exchange,  suprapubic tube exchange. Patient had RAFFI done and was negative for bacterial endocarditis with a vegetation  Patient was transferred back to medical floor 9/16/2022    Denies any chest pains,   Denies SOB . Denies nausea or vomiting. now eating better  No new bowel or bladder symptoms.        Intake/Output Summary (Last 24 hours) at 9/16/2022 1738  Last data filed at 9/16/2022 1308  Gross per 24 hour   Intake 50 ml   Output 1675 ml   Net -1625 ml         DATA    CBC:   Recent Labs     09/14/22  0554 09/15/22  0559 09/16/22  0900   WBC 8.1 7.6 8.7   HGB 8.2* 8.8* 9.1*    291 373      CMP:  Recent Labs     09/14/22  0554 09/15/22  0559 09/16/22  0859    143 142   K 3.6 3.6 3.9    105 101   CO2 25 27 29   BUN 36* 33* 29*   CREATININE 3.0* 2.6* 2.6*   CALCIUM 7.9* 8.1* 8.5   PROT 5.0* 5.2* 6.2*   LABALBU 2.4* 2.6* 3.0*   BILITOT 0.2 0.2 0.3   ALKPHOS 93 96 107   AST 19 25 27   ALT 7* 9* 11       Lipids: No results found for: CHOL, HDL, TRIG  Glucose:  Recent Labs     09/16/22  0941 09/16/22  1134 09/16/22  1649   POCGLU 132* 151* 205*       [id number]:  Lab Results   Component Value Date/Time    LABA1C 7.8 08/29/2022 01:30 AM     High Sensitivity TSH:   Lab Results   Component Value Date/Time    TSHHS 7.590 04/28/2022 06:59 AM     Free T3: No results found for: FT3  Free T4:  Lab Results   Component Value Date/Time    T4FREE 1.15 03/27/2022 08:22 AM       CT ABDOMEN PELVIS WO CONTRAST Additional Contrast? None   Final Result   1. Probable hematuria left urinary collecting system (possible active   hemorrhage into the left urinary collecting system) and mild-to-moderate left   hydronephrosis. 2. The double-J stent appears in unremarkable position without left ureter   calculus evident. 3. Trace abdominopelvic ascites is probably reactive. 4.  Vascular calcifications are noted reflecting calcific atherosclerosis. 5. Trace left pleural effusion with left basilar relaxation atelectasis or   infiltrate. 6. Minimal subsegmental atelectasis posterior right lung base. 7. Small hiatal hernia. XR CHEST 1 VIEW   Final Result   1. Right IJ line with the tip overlying the distal SVC/right atrial region. 2. Low lung volumes with bibasilar atelectasis. No significant change. XR CHEST PORTABLE   Final Result   Satisfactory position of the PICC. No acute cardiopulmonary process         VL DUP UPPER EXTREMITY VENOUS RIGHT   Final Result   No evidence of DVT within the right upper extremity. XR CHEST PORTABLE   Final Result   No acute process. XR ABDOMEN (KUB) (SINGLE AP VIEW)   Final Result   Prominent loops of bowel may represent persistent ileus or partial   obstruction. FL LESS THAN 1 HOUR   Final Result      XR CHEST PORTABLE   Final Result   No abnormalities noted. US ABDOMEN LIMITED Specify organ? LIVER, GALLBLADDER   Final Result   Unremarkable right upper quadrant ultrasound. CT ABDOMEN PELVIS WO CONTRAST Additional Contrast? None   Final Result   1. Left ureteral stent in place. Mild bilateral urinary tract dilatation   with new right perinephric and periureteral stranding raises concern for   urinary tract infection. No urinary stones. 2.  Suprapubic catheter in the decompressed urinary bladder.       3.  Ill-defined stranding/fluid throughout the retroperitoneum may relate to   vascular congestion or reactive changes. No intraperitoneal free air or   abscess. XR CHEST PORTABLE   Final Result   Increased interstitial opacity. While much or all of this may be chronic,   please correlate with any clinical evidence of acute interstitial edema.               Scheduled Medicines   Medications:    insulin glargine  10 Units SubCUTAneous Nightly    chlorhexidine gluconate   Topical Daily    mupirocin   Nasal TID    insulin lispro  0-8 Units SubCUTAneous 2 times per day    anidulafungin  100 mg IntraVENous Q24H    insulin lispro  0-8 Units SubCUTAneous TID WC    Cefiderocol Sulfate Tosylate  1,000 mg IntraVENous Q8H    lidocaine PF  5 mL IntraDERmal Once    sodium chloride flush  5-40 mL IntraVENous 2 times per day    sodium chloride flush  5-40 mL IntraVENous 2 times per day    meperidine  12.5 mg IntraVENous Once    ferrous sulfate  325 mg Oral BID WC    carvedilol  6.25 mg Oral BID WC    lidocaine  1 patch TransDERmal Daily    amLODIPine  10 mg Oral Daily    aspirin  81 mg Oral Daily    atorvastatin  80 mg Oral Nightly    levothyroxine  75 mcg Oral Daily    melatonin  10 mg Oral Nightly    pantoprazole  40 mg Oral QAM AC    sodium chloride flush  5-40 mL IntraVENous 2 times per day    [Held by provider] enoxaparin  40 mg SubCUTAneous QPM      Infusions:    sodium chloride      sodium chloride      sodium chloride 20 mL/hr at 09/15/22 1057    sodium chloride      dextrose      sodium chloride 25 mL (09/06/22 2327)    dextrose           Objective:   Vitals: BP (!) 151/59   Pulse 71   Temp 98.4 °F (36.9 °C)   Resp 18   Ht 5' 8\" (1.727 m)   Wt 207 lb 7.3 oz (94.1 kg)   SpO2 98%   BMI 31.54 kg/m²   General appearance: alert and cooperative with exam  Neck: no JVD or bruit  Thyroid : Normal lobes   Lungs: Has Vesicular Breath sounds   Heart:  regular rate and rhythm  Abdomen: soft, non-tender; bowel sounds normal; no masses,  no organomegaly has suprapubic catheter   Musculoskeletal: Normal  Extremities: extremities normal, , no edema  Neurologic:  Awake, alert, oriented to name, place and time. Cranial nerves II-XII are grossly intact. Motor weakness. Sensory neuropathy. ,  and gait is abnormal.  Stable    Assessment:     Patient Active Problem List:     Gait disturbance     Generalized weakness     Diabetes mellitus (HCC)     Osteoarthritis     Anemia of chronic disorder     Infected implanted bladder sphincter cuff     Escherichia coli urinary tract infection     Hypertension     Sepsis ([address]     Acute encephalopathy     Type 2 diabetes mellitus with hypoglycemia without coma ([facility])     UTI (urinary tract infection) due to urinary indwelling catheter ([facility])     Hyperkalemia     Acute kidney failure ([facility])     Leg weakness, bilateral     Type 2 diabetes mellitus with neurological manifestations, uncontrolled ([address]     Complicated UTI (urinary tract infection)     Essential hypertension     Severe muscle deconditioning     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 ([facility])     Frequent falls     Chronic kidney disease, stage 3a ([address]     Uncontrolled type 2 diabetes mellitus with peripheral neuropathy ([facility])     Prostate cancer (HCC)     Suprapubic catheter ([address]     Sepsis due to urinary tract infection ([address]     Coagulase negative Staphylococcus bacteremia     Chronic kidney disease, stage IV (severe) (HCC)     Acute metabolic encephalopathy     SVT (supraventricular tachycardia) (HCC)     Metabolic encephalopathy     History of prostate cancer     Cigarette nicotine dependence without complication     Altered mental status     Serratia marcescens infection     Hypoglycemia         Plan:     Reviewed POC blood glucose .  Labs and X ray results   Reviewed Current Medicines   On Correction bolus Humalog/ Basal Lantus ( On Hold )Insulin regime /   Monitor Blood glucose frequently   Modified  the dose of Insulin/ other medicines as neede  Management is working to show the patient to skilled nursing unit possibly [id number] home  Will follow     .      Maria Del Carmen Venegas MD, MD

## 2022-09-16 NOTE — PROGRESS NOTES
Nephrology Progress Note  9/16/2022 2:35 PM        Subjective:   Admit Date: 8/28/2022  PCP: Dion Curran MD    Interval History: Patient seen in early morning, this is a late entry  No major event    Diet: Eating reasonably well    ROS: Complain of some hematuria, although is clearing up  Urine output recorded 1.82 L for the last 24 hours  No fever and acceptable blood pressure    Data:     Current meds:    insulin glargine  10 Units SubCUTAneous Nightly    chlorhexidine gluconate   Topical Daily    mupirocin   Nasal TID    insulin lispro  0-8 Units SubCUTAneous 2 times per day    anidulafungin  100 mg IntraVENous Q24H    insulin lispro  0-8 Units SubCUTAneous TID WC    Cefiderocol Sulfate Tosylate  1,000 mg IntraVENous Q8H    lidocaine PF  5 mL IntraDERmal Once    sodium chloride flush  5-40 mL IntraVENous 2 times per day    sodium chloride flush  5-40 mL IntraVENous 2 times per day    meperidine  12.5 mg IntraVENous Once    ferrous sulfate  325 mg Oral BID WC    carvedilol  6.25 mg Oral BID WC    lidocaine  1 patch TransDERmal Daily    amLODIPine  10 mg Oral Daily    aspirin  81 mg Oral Daily    atorvastatin  80 mg Oral Nightly    levothyroxine  75 mcg Oral Daily    melatonin  10 mg Oral Nightly    pantoprazole  40 mg Oral QAM AC    sodium chloride flush  5-40 mL IntraVENous 2 times per day    [Held by provider] enoxaparin  40 mg SubCUTAneous QPM      sodium chloride      sodium chloride      sodium chloride 20 mL/hr at 09/15/22 1057    sodium chloride      dextrose      sodium chloride 25 mL (09/06/22 2327)    dextrose           I/O last 3 completed shifts:   In: 240 [P.O.:240]  Out: 2925 [Urine:2925]    CBC:   Recent Labs     09/14/22  0554 09/15/22  0559 09/16/22  0900   WBC 8.1 7.6 8.7   HGB 8.2* 8.8* 9.1*    291 373          Recent Labs     09/14/22  0554 09/15/22  0559 09/16/22  0859    143 142   K 3.6 3.6 3.9    105 101   CO2 25 27 29   BUN 36* 33* 29*   CREATININE 3.0* 2.6* 2.6* GLUCOSE 108* 110* 91       Lab Results   Component Value Date    CALCIUM 8.5 09/16/2022    PHOS 3.1 09/13/2022       Objective:     Vitals: /64   Pulse 65   Temp 98.4 °F (36.9 °C)   Resp 18   Ht 5' 8\" (1.727 m)   Wt 207 lb 7.3 oz (94.1 kg)   SpO2 98%   BMI 31.54 kg/m² ,    General appearance: Alert, awake and oriented  HEENT: Positive conjunctival pallor  Neck: Supple, head of the bed elevated about 30 degrees  Lungs: No gross crackles  Heart: Seemed regular rate and rhythm  Abdomen: Soft, suprapubic catheter  Extremities: Positive leg edema      Problem List :         Impression :     Acute kidney injury underlying CKD stage III-acceptable urine output and recovering  Infected stent-ureteral of course, which has been exchanged and hematuria improving  He does have diabetes, anemia and prostate cancer    Recommendation/Plan  :     Okay to discharge from my standpoint  Am still holding diuretics  Good glycemic control  Need close outpatient follow-up with me in 2 to 3 weeks  Also need lab prior to appointment especially CMP, CBC differential, magnesium and phosphorus  Watch for iatrogenic nosocomial complication  Follow clinically      Tita Mckeon MD MD

## 2022-09-16 NOTE — PROGRESS NOTES
Infectious Disease Progress Note  2022   Patient Name: Yari Granados : 1940     Assessment  Sepsis secondary to ESBL E. coli pyelonephritis associated with suprapubic cystostomy. Candida albicans Fungemia Source Unclear,  Possible Endocarditis:     MRSA Screen Positive:    History of prostate cancer status post RPP. ESBL E coli bacteremia. S/p cystoscopy, left ureteral stent exchange and peritoneal mccrary catheter exchange on 2022. Fever has worsened, CRP and pct is elevated. raises question of persistent ESBL bacteremia, CRE, fungal UTI and pneumonia. Hematuria: post procedure  ?ileus  CKD stage IIIB  T2DM  Diabetic neuropathy     Plan  Therapeutic: -d/c meropenem 1 g q 12 hours . Continue IV Fetroja 1 gm q8h x 14 days (end date 22)    DC IV vancomycin (-9/10/22)   Continue IV Eraxis 100 mg q24h for 2 weeks from negative BC at 48h, duration (end date 22)  De-colonize MRSA ordered: Bactroban ointment to nares x 5 days, Hibiclens baths daily x 10 days  Diagnostic: MRSA nares positive, await repeat [facility]   Recommend consult with ophthalmology to evaluate for possible endophthalmitis   Reviewed RAFFI, no evidence of endocarditis. For DC: Follow up with ID in 2 weeks please  Weekly labs drawn on Monday during the course of treatment  CBC with differential, CMP, ESR, CRP  Fax results to Attn: Nirali Solomon Infectious Diseases Staff  # [phone]   OK from ID standpoint to DC when ready    Reason for visit: F/u ESBL E coli bacteremia and left pyelonephritis? History:? Interval history noted  Denies n/v/d/f or untoward effects of antimicrobials. Resting quietly, with intermittent confusion. Physical Exam:  Vital Signs: BP (!) 150/79   Pulse 67   Temp 97.7 °F (36.5 °C) (Oral)   Resp 18   Ht 5' 8\" (1.727 m)   Wt 207 lb 7.3 oz (94.1 kg)   SpO2 100%   BMI 31.54 kg/m²     Gen:  alert, resting in bed.    HEMT: AT/NC Oropharynx pink, moist, and without lesions or exudates; dentition in good state of repair  Eyes: PERRLA, EOMI, conjunctiva pink, sclera anicteric. Neck: Supple. Trachea midline. No LAD. Chest: no distress and CTA. Good air movement. Heart: RRR and no MRG. Abd: suprapubic catheter, soft, gaseous distension, no tenderness, no hepatomegaly. Normoactive bowel sounds. Ext: no clubbing, cyanosis, or edema  Catheter Site: without erythema or tenderness  Neuro: Mental status intact. CN 2-12 intact and no focal sensory or motor deficits     Radiologic / Imaging / TESTING  9/10/22 XR Chest:  Impression   1. Right IJ line with the tip overlying the distal SVC/right atrial region. 2. Low lung volumes with bibasilar atelectasis. No significant change. 9/10/22 CT Abdomen Pelvis WO Contrast:  Impression   1. Probable hematuria left urinary collecting system (possible active   hemorrhage into the left urinary collecting system) and mild-to-moderate left   hydronephrosis. 2. The double-J stent appears in unremarkable position without left ureter   calculus evident. 3. Trace abdominopelvic ascites is probably reactive. 4.  Vascular calcifications are noted reflecting calcific atherosclerosis. 5. Trace left pleural effusion with left basilar relaxation atelectasis or   infiltrate. 6. Minimal subsegmental atelectasis posterior right lung base. 7. Small hiatal hernia. 9/12/22 Echo Complete:   Summary   Left ventricular systolic function is normal.   Ejection fraction is visually estimated at 60%. Grade II diastolic dysfunction. Cannot rule out endocarditis on the tricuspid valve. No evidence of any pericardial effusion.     Labs:    Recent Results (from the past 24 hour(s))   POCT Glucose    Collection Time: 09/15/22 11:03 AM   Result Value Ref Range    POC Glucose 186 (H) 70 - 99 MG/DL   POCT Glucose    Collection Time: 09/15/22 11:28 AM   Result Value Ref Range    POC Glucose 202 (H) 70 - 99 MG/DL   POCT Glucose    Collection Time: 09/15/22  5:09 PM Result Value Ref Range    POC Glucose 243 (H) 70 - 99 MG/DL   POCT Glucose    Collection Time: 09/15/22  7:38 PM   Result Value Ref Range    POC Glucose 228 (H) 70 - 99 MG/DL   POCT Glucose    Collection Time: 09/15/22  9:19 PM   Result Value Ref Range    POC Glucose 209 (H) 70 - 99 MG/DL   POCT Glucose    Collection Time: 09/16/22  2:53 AM   Result Value Ref Range    POC Glucose 99 70 - 99 MG/DL   POCT Glucose    Collection Time: 09/16/22  7:49 AM   Result Value Ref Range    POC Glucose 60 (L) 70 - 99 MG/DL   POCT Glucose    Collection Time: 09/16/22  8:31 AM   Result Value Ref Range    POC Glucose 66 (L) 70 - 99 MG/DL     CULTURE results: Invalid input(s): BLOOD CULTURE,  URINE CULTURE, SURGICAL CULTURE    Diagnosis:  Patient Active Problem List   Diagnosis    Gait disturbance    Generalized weakness    Diabetes mellitus (HCC)    Osteoarthritis    Anemia of chronic disorder    Infected implanted bladder sphincter cuff    Escherichia coli urinary tract infection    Hypertension    Sepsis ([address]    Acute encephalopathy    Type 2 diabetes mellitus with hypoglycemia without coma (HCC)    UTI (urinary tract infection) due to urinary indwelling catheter (HCC)    Hyperkalemia    Acute kidney failure (HCC)    Leg weakness, bilateral    Type 2 diabetes mellitus with neurological manifestations, uncontrolled (HCC)    Complicated UTI (urinary tract infection)    Essential hypertension    Severe muscle deconditioning    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 (HCC)    Frequent falls    Chronic kidney disease, stage 3a (HCC)    Uncontrolled type 2 diabetes mellitus with peripheral neuropathy (HCC)    Prostate cancer (HCC)    Suprapubic catheter ([address]    Sepsis due to urinary tract infection (HCC)    Coagulase negative Staphylococcus bacteremia    Chronic kidney disease, stage IV (severe) (HCC)    Acute metabolic encephalopathy    SVT (supraventricular tachycardia) (HCC)    Metabolic encephalopathy    History of prostate cancer    Cigarette nicotine dependence without complication    Altered mental status    Serratia marcescens infection    Hypoglycemia    Fungemia       Active Problems  Principal Problem:    Complicated UTI (urinary tract infection)  Active Problems:    Anemia of chronic disorder    Fungemia    Chronic kidney disease, stage 3a (HCC)    SVT (supraventricular tachycardia) (HCC)  Resolved Problems:    * No resolved hospital problems. *              Electronically signed by: Electronically signed by Loyd Grant.  VIVIANA Tate CNP on 9/16/2022 at 9:18 AM

## 2022-09-16 NOTE — CARE COORDINATION
Reviewed chart and discussed in IDR, plan is to [facility] once medically ready. PS to Dr Tian Rock to see if plan is today. Also called and updated Tomasa from [facility] on iv atb plan. She is running the numbers for iv atb cost.   CM will continue to follow. Christiano with [facility] and iv atb Fetroja too expensive for them to take pt on it. PS to DONATO Tate to see if another cheaper options is appropriate for this pt.      1500 Spoke with Dr Tian Rock pt going on 2 iv atb, Marin Mallet can be changed to Meropenem but also needs Eraxis. Spoke with Marnie Sanders and she is researching cost of Eraxis. If not able to take pt  asked about Swing bed at [facility] if [facility] can not take pt with present iv atb. 1615 They can not suhail on Eraxis, PS to DONATO Tate to see if there is a cheaper options.

## 2022-09-17 LAB
GLUCOSE BLD-MCNC: 144 MG/DL (ref 70–99)
GLUCOSE BLD-MCNC: 151 MG/DL (ref 70–99)
GLUCOSE BLD-MCNC: 151 MG/DL (ref 70–99)
GLUCOSE BLD-MCNC: 156 MG/DL (ref 70–99)
GLUCOSE BLD-MCNC: 199 MG/DL (ref 70–99)
GLUCOSE BLD-MCNC: 231 MG/DL (ref 70–99)

## 2022-09-17 PROCEDURE — 2580000003 HC RX 258: Performed by: NURSE PRACTITIONER

## 2022-09-17 PROCEDURE — 82962 GLUCOSE BLOOD TEST: CPT

## 2022-09-17 PROCEDURE — 1200000000 HC SEMI PRIVATE

## 2022-09-17 PROCEDURE — 6360000002 HC RX W HCPCS: Performed by: SPECIALIST

## 2022-09-17 PROCEDURE — 94150 VITAL CAPACITY TEST: CPT

## 2022-09-17 PROCEDURE — 6360000002 HC RX W HCPCS: Performed by: NURSE PRACTITIONER

## 2022-09-17 PROCEDURE — 6370000000 HC RX 637 (ALT 250 FOR IP): Performed by: NURSE PRACTITIONER

## 2022-09-17 PROCEDURE — 6370000000 HC RX 637 (ALT 250 FOR IP): Performed by: INTERNAL MEDICINE

## 2022-09-17 PROCEDURE — 6370000000 HC RX 637 (ALT 250 FOR IP): Performed by: SPECIALIST

## 2022-09-17 PROCEDURE — 94761 N-INVAS EAR/PLS OXIMETRY MLT: CPT

## 2022-09-17 PROCEDURE — 2580000003 HC RX 258: Performed by: STUDENT IN AN ORGANIZED HEALTH CARE EDUCATION/TRAINING PROGRAM

## 2022-09-17 RX ADMIN — ONDANSETRON 4 MG: 2 INJECTION INTRAMUSCULAR; INTRAVENOUS at 17:52

## 2022-09-17 RX ADMIN — Medication: at 20:46

## 2022-09-17 RX ADMIN — SODIUM CHLORIDE 25 ML: 9 INJECTION, SOLUTION INTRAVENOUS at 16:22

## 2022-09-17 RX ADMIN — Medication: at 09:34

## 2022-09-17 RX ADMIN — SODIUM CHLORIDE, PRESERVATIVE FREE 10 ML: 5 INJECTION INTRAVENOUS at 20:46

## 2022-09-17 RX ADMIN — AMLODIPINE BESYLATE 10 MG: 10 TABLET ORAL at 09:33

## 2022-09-17 RX ADMIN — SODIUM CHLORIDE 25 ML: 9 INJECTION, SOLUTION INTRAVENOUS at 09:47

## 2022-09-17 RX ADMIN — ATORVASTATIN CALCIUM 80 MG: 40 TABLET, FILM COATED ORAL at 20:46

## 2022-09-17 RX ADMIN — ACETAMINOPHEN 650 MG: 325 TABLET ORAL at 19:11

## 2022-09-17 RX ADMIN — LEVOTHYROXINE SODIUM 75 MCG: 75 TABLET ORAL at 05:27

## 2022-09-17 RX ADMIN — FERROUS SULFATE TAB 325 MG (65 MG ELEMENTAL FE) 325 MG: 325 (65 FE) TAB at 16:18

## 2022-09-17 RX ADMIN — Medication: at 14:35

## 2022-09-17 RX ADMIN — ASPIRIN 81 MG CHEWABLE TABLET 81 MG: 81 TABLET CHEWABLE at 09:34

## 2022-09-17 RX ADMIN — Medication 10 MG: at 20:46

## 2022-09-17 RX ADMIN — PANTOPRAZOLE SODIUM 40 MG: 40 TABLET, DELAYED RELEASE ORAL at 05:27

## 2022-09-17 RX ADMIN — CEFIDEROCOL SULFATE TOSYLATE 1000 MG: 1 INJECTION, POWDER, FOR SOLUTION INTRAVENOUS at 16:23

## 2022-09-17 RX ADMIN — CHLORHEXIDINE GLUCONATE 120 ML: 4 LIQUID TOPICAL at 09:41

## 2022-09-17 RX ADMIN — INSULIN LISPRO 2 UNITS: 100 INJECTION, SOLUTION INTRAVENOUS; SUBCUTANEOUS at 12:18

## 2022-09-17 RX ADMIN — CARVEDILOL 6.25 MG: 6.25 TABLET, FILM COATED ORAL at 09:33

## 2022-09-17 RX ADMIN — CEFIDEROCOL SULFATE TOSYLATE 1000 MG: 1 INJECTION, POWDER, FOR SOLUTION INTRAVENOUS at 09:47

## 2022-09-17 RX ADMIN — FERROUS SULFATE TAB 325 MG (65 MG ELEMENTAL FE) 325 MG: 325 (65 FE) TAB at 09:34

## 2022-09-17 RX ADMIN — CARVEDILOL 6.25 MG: 6.25 TABLET, FILM COATED ORAL at 16:18

## 2022-09-17 RX ADMIN — SODIUM CHLORIDE, PRESERVATIVE FREE 10 ML: 5 INJECTION INTRAVENOUS at 09:35

## 2022-09-17 RX ADMIN — DEXTROSE MONOHYDRATE 100 MG: 50 INJECTION, SOLUTION INTRAVENOUS at 13:00

## 2022-09-17 ASSESSMENT — PAIN DESCRIPTION - ORIENTATION: ORIENTATION: RIGHT

## 2022-09-17 ASSESSMENT — PAIN DESCRIPTION - DESCRIPTORS: DESCRIPTORS: ACHING

## 2022-09-17 ASSESSMENT — PAIN SCALES - GENERAL: PAINLEVEL_OUTOF10: 3

## 2022-09-17 ASSESSMENT — PAIN DESCRIPTION - LOCATION: LOCATION: HAND;TOE (COMMENT WHICH ONE)

## 2022-09-17 NOTE — PROGRESS NOTES
V2.0  [facility] [place] Hospitalist Progress Note      Name:  Katy Holt /Age/Sex: 1940  (78 y.o. male)   MRN & CSN:  [id number] & [id number] Encounter Date/Time: 2022 6:54 PM EDT    Location:  [id number]- PCP: Todd Gonzalez MD       Hospital Day: 21    Assessment and Plan:   Katy Holt is a 78 y.o. male with pmh of prostate cancer, diabetes mellitus, hypertension who admitted with sciatic found out with Complicated UTI (urinary tract infection)    #Candida albicans fungemia  #Candida UTI  -Transthoracic echo inconclusive  - Spoke to ophthalmology, recommended checking visual acuity, per my evaluation, patient's vision is 20/20 bilaterally (with glasses on)  -No growth on catheter tip culture for 36-48h  -Repeat Bcx with no growth at 24h  -RAFFI negative for vegetations  -Negative repeat blood cultures and central line tip culture  -Positive MRSA screen    Plan:  ID following, appreciate recs  Continue Eraxis (started ) x2 weeks total     #ESBL bacteremia in the setting of UTI  #Complicated UTI  - Urine culture on  growing E. coli resistant to Cipro and cefepime  - Urine culture on  no significant growth  - CT abdomen/pelvis showing left ureteral stent in place, mild bilateral urinary tract dilatation with new right perinephric and periureteral stranding raises concern for UTI    Plan:  Continue cefiderocol x2 weeks total  ID following, appreciated recs     #Chronic urinary retention managed with suprapubic catheter and exchanged monthly, last exchange  with a cystoscopy patient had hematuria and patient became lethargic and drowsy with elevated leukocytes discussed with ID started on vancomycin and fluconazole and  obtaining blood cultures and urine cultures urine and blood cultures came back positive for Candida albicans treated as above  # Hematuria s/p cystoscopy and exchange of stent  -Urology now following peripherally, recommended to manually irrigate bladder every 4 hours and as needed clots     #Patient with RUE swelling-Improving  -ultrasound RUE negative for DVT Likely due to volume overload in the setting of PAMELLA    Chronic medical problems:      # Normocytic anemia   - Continue with H&H  - Transfuse if hemoglobin less than 7     # History of prostate cancer s/p prostatectomy 1996    #Hypertension  Plan: continue home meds     # Hypothyroidism   Plan: continue levothyroxine     # Type 2 diabetes  Continue SSI, hypoglycemic protocol diabetic diet      Diet ADULT DIET; Regular; 4 carb choices (60 gm/meal)  ADULT ORAL NUTRITION SUPPLEMENT; Breakfast, Dinner; Diabetic Oral Supplement   DVT Prophylaxis [x] Lovenox, []  Heparin, [] SCDs, [] Ambulation,  [] Eliquis, [] Xarelto  [] Coumadin   Code Status Full Code   Disposition From: Home  Expected Disposition:  SNF  Estimated Date of Discharge: Greater than 2 days  Patient requires continued admission due to candidemia, UTI requiring IV antibiotics -->having difficulty finding placement because meds are expensive   Surrogate Decision Maker/ JO-ANN Jasso. Subjective:     Chief Complaint: Fatigue (General weakness started this am. Family called because pt had changed from baseline. Currently being tx for UTI. Pt is slightly confused, doesn't know the year. He has been N/V. )     Patient without complaints this morning. Review of Systems:    General: No fever, no chills  Eyes: No blurry vision  Heart: No chest pain, no palpitations  Lungs: No shortness of breath, no cough  Abdomen: No abdominal pain, no nausea, no vomiting, no constipation, no diarrhea  : No frequency, no dysuria, no urgency, no decreased urination  MSK: No lower extremity swelling  Neuro: No confusion, no weakness  Skin: No rashes    Objective:      Intake/Output Summary (Last 24 hours) at 9/17/2022 1224  Last data filed at 9/17/2022 0935  Gross per 24 hour   Intake 60 ml   Output 1025 ml   Net -965 ml        Vitals:   Vitals: 09/17/22 0800   BP: (!) 143/78   Pulse: 74   Resp: 16   Temp: 98.1 °F (36.7 °C)   SpO2: 98%       Physical Exam:     General: NAD, AAO x3-4  Eyes: EOMI  HENT: Atraumatic  Resp: no respiratory distress  GI: Normal bowel sounds, soft, nondistended, nontender  MSK: Normal ROM, no lower extremity edema, bilateral hand swelling  Skin: Intact, dry, warm, no rashes  Neuro: CN II to XII grossly intact  Psych: Normal mood    Medications:   Medications:    insulin glargine  10 Units SubCUTAneous Nightly    chlorhexidine gluconate   Topical Daily    mupirocin   Nasal TID    insulin lispro  0-8 Units SubCUTAneous 2 times per day    anidulafungin  100 mg IntraVENous Q24H    insulin lispro  0-8 Units SubCUTAneous TID WC    Cefiderocol Sulfate Tosylate  1,000 mg IntraVENous Q8H    lidocaine PF  5 mL IntraDERmal Once    sodium chloride flush  5-40 mL IntraVENous 2 times per day    meperidine  12.5 mg IntraVENous Once    ferrous sulfate  325 mg Oral BID WC    carvedilol  6.25 mg Oral BID WC    lidocaine  1 patch TransDERmal Daily    amLODIPine  10 mg Oral Daily    aspirin  81 mg Oral Daily    atorvastatin  80 mg Oral Nightly    levothyroxine  75 mcg Oral Daily    melatonin  10 mg Oral Nightly    pantoprazole  40 mg Oral QAM AC    [Held by provider] enoxaparin  40 mg SubCUTAneous QPM      Infusions:    sodium chloride      sodium chloride      sodium chloride 25 mL (09/17/22 0947)    sodium chloride      dextrose      sodium chloride 25 mL (09/06/22 8037)    dextrose       PRN Meds: sodium chloride, , PRN  bisacodyl, 10 mg, Daily PRN  docusate sodium, 100 mg, BID PRN  polyethylene glycol, 17 g, Daily PRN  sodium chloride, , PRN  sodium chloride flush, 5-40 mL, PRN  sodium chloride, , PRN  sodium chloride flush, 5-40 mL, PRN  sodium chloride, 25 mL, PRN  HYDROmorphone, 0.25 mg, Q5 Min PRN  fentanNYL, 50 mcg, Q5 Min PRN  glucose, 4 tablet, PRN  dextrose bolus, 125 mL, PRN   Or  dextrose bolus, 250 mL, PRN  glucagon (rDNA), 1 mg, PRN  dextrose, , Continuous PRN  guaiFENesin-dextromethorphan, 5 mL, Q4H PRN  magnesium hydroxide, 30 mL, Daily PRN  sodium chloride flush, 5-40 mL, PRN  sodium chloride, , PRN  ondansetron, 4 mg, Q8H PRN   Or  ondansetron, 4 mg, Q6H PRN  aluminum & magnesium hydroxide-simethicone, 30 mL, Q6H PRN  acetaminophen, 650 mg, Q6H PRN   Or  acetaminophen, 650 mg, Q6H PRN  glucose, 4 tablet, PRN  dextrose bolus, 125 mL, PRN   Or  dextrose bolus, 250 mL, PRN  glucagon (rDNA), 1 mg, PRN  dextrose, , Continuous PRN  ipratropium-albuterol, 1 vial, BID PRN      Labs      Recent Results (from the past 24 hour(s))   POCT Glucose    Collection Time: 09/16/22  4:49 PM   Result Value Ref Range    POC Glucose 205 (H) 70 - 99 MG/DL   POCT Glucose    Collection Time: 09/16/22  7:38 PM   Result Value Ref Range    POC Glucose 133 (H) 70 - 99 MG/DL   POCT Glucose    Collection Time: 09/16/22  9:45 PM   Result Value Ref Range    POC Glucose 129 (H) 70 - 99 MG/DL   POCT Glucose    Collection Time: 09/17/22  1:29 AM   Result Value Ref Range    POC Glucose 151 (H) 70 - 99 MG/DL   POCT Glucose    Collection Time: 09/17/22  5:29 AM   Result Value Ref Range    POC Glucose 151 (H) 70 - 99 MG/DL   POCT Glucose    Collection Time: 09/17/22  7:25 AM   Result Value Ref Range    POC Glucose 144 (H) 70 - 99 MG/DL   POCT Glucose    Collection Time: 09/17/22 11:16 AM   Result Value Ref Range    POC Glucose 231 (H) 70 - 99 MG/DL        Imaging/Diagnostics Last 24 Hours   Echocardiogram complete 2D with doppler with color    Result Date: 9/12/2022  Transthoracic Echocardiography Report (TTE)  Demographics   Patient Name      Elena MARCIAL     Date of Study       09/12/2022   Date of Birth     1940         Gender              Male   Age               78 year(s)         Race                Black   Patient Number    [id number]         Room Number         2006   Visit Number      [id number]   Corporate ID      [id number]   Accession Number  [id number] Sonographer         Modesto Tolbert, RCS   Ordering          Juanalysha Austin  Interpreting        Marjorie Hartman  Physician         NORA APRN-[facility]         Physician           MD  Procedure Type of Study   TTE procedure:ECHOCARDIOGRAM COMPLETE 2D W DOPPLER W COLOR. Procedure Date Date: 09/12/2022 Start: 02:23 PM Study Location: Portable Technical Quality: Adequate visualization Indications:Endocarditis. Patient Status: Routine Height: 68 inches Weight: 210 pounds BSA: 2.09 m2 BMI: 31.93 kg/m2 HR: 68 bpm BP: 141/57 mmHg  Conclusions   Summary  Left ventricular systolic function is normal.  Ejection fraction is visually estimated at 60%. Grade II diastolic dysfunction. Cannot rule out endocarditis on the tricuspid valve. No evidence of any pericardial effusion. Signature   ------------------------------------------------------------------  Electronically signed by Marjorie Hartman MD (Interpreting  physician) on 09/12/2022 at 06:43 PM  ------------------------------------------------------------------   Findings   Left Ventricle  Left ventricular systolic function is normal.  Ejection fraction is visually estimated at 60%. No regional wall motion abnormalites. Grade II diastolic dysfunction. Left ventricle size is normal.   Left Atrium  Essentially normal left atrium. Right Atrium  Essentially normal right atrium. Right Ventricle  Essentially normal right ventricle. Aortic Valve  Sclerotic, but non-stenotic aortic valve. No significant valvular disease noted. Mitral Valve  Structurally normal mitral valve. Mild mitral regurgitation. Tricuspid Valve  Tricuspid valve is structurally normal.  Mild tricuspid regurgitation. Cannot rule out endocarditis on the tricuspid valve. Pulmonic Valve  Pulmonic valve is structurally normal.  No significant valvular disease noted. Pericardial Effusion  No evidence of any pericardial effusion. Pleural Effusion  No evidence of pleural effusion. Miscellaneous  IVC and abdominal aorta are within normal limits.   M-Mode/2D Measurements & Calculations   LV Diastolic Dimension:  LV Systolic Dimension:  LA Dimension: 3.7 cmAO Root  3.68 cm                  2.44 cm                 Dimension: 3.3 cmLA Area:  LV FS:33.7 %             LV Volume Diastolic: 62 12.6 cm2  LV PW Diastolic: 1.3 cm  ml  LV PW Systolic: 3.50 cm  LV Volume Systolic: 26  Septum Diastolic: 6.12   ml  cm                       LV EDV/LV EDV Index: 62  Septum Systolic: 5.07 cm BU/70 X4WO ESV/LV ESV   LA/Aorta: 1.12  CO: 4.47 l/min           Index: 26 ml/12 m2      Ascending Aorta: 3.4 cm  CI: 2.14 l/m*m2          EF Calculated (A4C):    LA volume/Index: 27 ml                           58.1 %                  /05I4  LV Area Diastolic: 24    EF Calculated (2D):  cm2                      63.4 %  LV Area Systolic: 62.0  cm2                      LV Length: 7.77 cm                            LVOT: 2.1 cm  Doppler Measurements & Calculations   MV Peak E-Wave: 110     AV Peak Velocity: 150 cm/s  LVOT Peak Velocity: 88  cm/s                    AV Peak Gradient: 9 mmHg    cm/s  MV Peak A-Wave: 89.3    AV Mean Velocity: 107 cm/s  LVOT Mean Velocity: 65  cm/s                    AV Mean Gradient: 5 mmHg    cm/s  MV E/A Ratio: 1.23      AV VTI: 31.9 cm             LVOT Peak Gradient: 3  MV Peak Gradient: 4.84  AV Area (Continuity):2.06   mmHgLVOT Mean Gradient:  mmHg                    cm2                         2 mmHg  MV Mean Gradient: 4                                 Estimated RVSP: 35 mmHg  mmHg                    LVOT VTI: 19 cm             Estimated RAP:3 mmHg  MV Mean Velocity: 90.2  cm/s                    Estimated PASP: 35.49 mmHg  MV Deceleration Time:                               TR Velocity:285 cm/s  203 msec                                            TR Gradient:32.49 mmHg   MV Area (continuity):  1.67 cm2  MV E' Septal Velocity:  8.33 cm/s MV A' Septal Velocity:  7.13 cm/s  MV E' Lateral Velocity:  8.11 cm/s  MV A' Lateral Velocity:  9.32 cm/s  MV E/E' septal: 13.21  MV E/E' lateral: 13.56        Electronically signed by Sophie Allan MD on 9/17/2022 at 12:24 PM

## 2022-09-17 NOTE — PROGRESS NOTES
Nephrology Progress Note  9/17/2022 1:28 PM        Subjective:   Admit Date: 8/28/2022  PCP: Noemi Garcia MD    Interval History: Patient seen in early morning, this is a late entry  No major event    Diet: Reasonable    ROS: No overt shortness of breath or confusion, urine output recorded 1.375 L for the last 24 hours, no fever    Data:     Current meds:    insulin glargine  10 Units SubCUTAneous Nightly    chlorhexidine gluconate   Topical Daily    mupirocin   Nasal TID    insulin lispro  0-8 Units SubCUTAneous 2 times per day    anidulafungin  100 mg IntraVENous Q24H    insulin lispro  0-8 Units SubCUTAneous TID WC    Cefiderocol Sulfate Tosylate  1,000 mg IntraVENous Q8H    lidocaine PF  5 mL IntraDERmal Once    sodium chloride flush  5-40 mL IntraVENous 2 times per day    meperidine  12.5 mg IntraVENous Once    ferrous sulfate  325 mg Oral BID WC    carvedilol  6.25 mg Oral BID WC    lidocaine  1 patch TransDERmal Daily    amLODIPine  10 mg Oral Daily    aspirin  81 mg Oral Daily    atorvastatin  80 mg Oral Nightly    levothyroxine  75 mcg Oral Daily    melatonin  10 mg Oral Nightly    pantoprazole  40 mg Oral QAM AC    [Held by provider] enoxaparin  40 mg SubCUTAneous QPM      sodium chloride      sodium chloride      sodium chloride 25 mL (09/17/22 0947)    sodium chloride      dextrose      sodium chloride 25 mL (09/06/22 2327)    dextrose           I/O last 3 completed shifts:   In: 48 [P.O.:50]  Out: 2700 [Urine:2700]    CBC:   Recent Labs     09/15/22  0559 09/16/22  0900   WBC 7.6 8.7   HGB 8.8* 9.1*    373          Recent Labs     09/15/22  0559 09/16/22  0859    142   K 3.6 3.9    101   CO2 27 29   BUN 33* 29*   CREATININE 2.6* 2.6*   GLUCOSE 110* 91       Lab Results   Component Value Date    CALCIUM 8.5 09/16/2022    PHOS 3.1 09/13/2022       Objective:     Vitals: BP (!) 143/78   Pulse 74   Temp 98.1 °F (36.7 °C) (Oral)   Resp 16   Ht 5' 8\" (1.727 m)   Wt 208 lb 1.6 oz (94.4 kg)   SpO2 98%   BMI 31.64 kg/m² ,    General appearance: Alert, awake and oriented  HEENT: At least 2+ conjunctival pallor  Neck: Supple, head of the bed elevated about 20 degrees  Lungs: No gross crackles on auscultation  Heart: Seemed regular rate and rhythm this morning  Abdomen: Soft, nontender to palpation, suprapubic catheter  Extremities: Leg edema      Problem List :         Impression :     Acute kidney injury on top of CKD stage III-urine output dropped but acceptable-no labs this morning creatinine was down going  Infected left ureteral stent which has been exchanged  Does have diabetes, prostate cancer, multifactoral anemia and debility    Recommendation/Plan  :     He is on multiple antimicrobial agent including antifungal-as he thought to have fungal infection-good glycemic control  He remains in risk for iatrogenic and nosocomial complication  Follow clinically and biochemically  Watch volume status closely      Tex Andrews MD MD

## 2022-09-17 NOTE — PROGRESS NOTES
Pt had one episode of emesis. Daughter asks that he only intakes clear liquids through the evening and night time to give his stomach a rest. Pt and daughter request water, ice chips, ginger ale, and jello at this time.

## 2022-09-17 NOTE — PLAN OF CARE
Problem: Discharge Planning  Goal: Discharge to home or other facility with appropriate resources  Outcome: Progressing  Flowsheets (Taken 9/17/2022 0948)  Discharge to home or other facility with appropriate resources:   Identify barriers to discharge with patient and caregiver   Arrange for needed discharge resources and transportation as appropriate   Identify discharge learning needs (meds, wound care, etc)   Refer to discharge planning if patient needs post-hospital services based on physician order or complex needs related to functional status, cognitive ability or social support system     Problem: Skin/Tissue Integrity  Goal: Absence of new skin breakdown  Description: 1. Monitor for areas of redness and/or skin breakdown  2. Assess vascular access sites hourly  3. Every 4-6 hours minimum:  Change oxygen saturation probe site  4. Every 4-6 hours:  If on nasal continuous positive airway pressure, respiratory therapy assess nares and determine need for appliance change or resting period.   Outcome: Progressing     Problem: Chronic Conditions and Co-morbidities  Goal: Patient's chronic conditions and co-morbidity symptoms are monitored and maintained or improved  Outcome: Progressing  Flowsheets (Taken 9/17/2022 0948)  Care Plan - Patient's Chronic Conditions and Co-Morbidity Symptoms are Monitored and Maintained or Improved:   Collaborate with multidisciplinary team to address chronic and comorbid conditions and prevent exacerbation or deterioration   Monitor and assess patient's chronic conditions and comorbid symptoms for stability, deterioration, or improvement   Update acute care plan with appropriate goals if chronic or comorbid symptoms are exacerbated and prevent overall improvement and discharge     Problem: Pain  Goal: Verbalizes/displays adequate comfort level or baseline comfort level  Outcome: Progressing  Flowsheets (Taken 9/17/2022 0800)  Verbalizes/displays adequate comfort level or baseline comfort level:   Encourage patient to monitor pain and request assistance   Assess pain using appropriate pain scale   Administer analgesics based on type and severity of pain and evaluate response   Implement non-pharmacological measures as appropriate and evaluate response   Consider cultural and social influences on pain and pain management   Notify Licensed Independent Practitioner if interventions unsuccessful or patient reports new pain     Problem: Safety - Adult  Goal: Free from fall injury  Outcome: Progressing  Flowsheets (Taken 9/17/2022 5698)  Free From Fall Injury: Instruct family/caregiver on patient safety     Problem: Nutrition Deficit:  Goal: Optimize nutritional status  Outcome: Progressing

## 2022-09-18 LAB
ALBUMIN SERPL-MCNC: 3 GM/DL (ref 3.4–5)
ALP BLD-CCNC: 95 IU/L (ref 40–129)
ALT SERPL-CCNC: 12 U/L (ref 10–40)
ANION GAP SERPL CALCULATED.3IONS-SCNC: 9 MMOL/L (ref 4–16)
AST SERPL-CCNC: 23 IU/L (ref 15–37)
BILIRUB SERPL-MCNC: 0.3 MG/DL (ref 0–1)
BUN BLDV-MCNC: 28 MG/DL (ref 6–23)
CALCIUM SERPL-MCNC: 8.4 MG/DL (ref 8.3–10.6)
CHLORIDE BLD-SCNC: 104 MMOL/L (ref 99–110)
CO2: 30 MMOL/L (ref 21–32)
CREAT SERPL-MCNC: 2.2 MG/DL (ref 0.9–1.3)
CULTURE: NORMAL
CULTURE: NORMAL
GFR AFRICAN AMERICAN: 35 ML/MIN/1.73M2
GFR NON-AFRICAN AMERICAN: 29 ML/MIN/1.73M2
GLUCOSE BLD-MCNC: 150 MG/DL (ref 70–99)
GLUCOSE BLD-MCNC: 153 MG/DL (ref 70–99)
GLUCOSE BLD-MCNC: 155 MG/DL (ref 70–99)
GLUCOSE BLD-MCNC: 163 MG/DL (ref 70–99)
GLUCOSE BLD-MCNC: 200 MG/DL (ref 70–99)
GLUCOSE BLD-MCNC: 208 MG/DL (ref 70–99)
Lab: NORMAL
Lab: NORMAL
POTASSIUM SERPL-SCNC: 3.7 MMOL/L (ref 3.5–5.1)
SODIUM BLD-SCNC: 143 MMOL/L (ref 135–145)
SPECIMEN: NORMAL
SPECIMEN: NORMAL
TOTAL PROTEIN: 5.9 GM/DL (ref 6.4–8.2)

## 2022-09-18 PROCEDURE — 6370000000 HC RX 637 (ALT 250 FOR IP): Performed by: SPECIALIST

## 2022-09-18 PROCEDURE — 2580000003 HC RX 258: Performed by: NURSE PRACTITIONER

## 2022-09-18 PROCEDURE — 36415 COLL VENOUS BLD VENIPUNCTURE: CPT

## 2022-09-18 PROCEDURE — 6360000002 HC RX W HCPCS: Performed by: NURSE PRACTITIONER

## 2022-09-18 PROCEDURE — 2580000003 HC RX 258: Performed by: STUDENT IN AN ORGANIZED HEALTH CARE EDUCATION/TRAINING PROGRAM

## 2022-09-18 PROCEDURE — 6370000000 HC RX 637 (ALT 250 FOR IP): Performed by: INTERNAL MEDICINE

## 2022-09-18 PROCEDURE — 6370000000 HC RX 637 (ALT 250 FOR IP): Performed by: NURSE PRACTITIONER

## 2022-09-18 PROCEDURE — 1200000000 HC SEMI PRIVATE

## 2022-09-18 PROCEDURE — 94150 VITAL CAPACITY TEST: CPT

## 2022-09-18 PROCEDURE — 80053 COMPREHEN METABOLIC PANEL: CPT

## 2022-09-18 PROCEDURE — 82962 GLUCOSE BLOOD TEST: CPT

## 2022-09-18 RX ADMIN — INSULIN LISPRO 2 UNITS: 100 INJECTION, SOLUTION INTRAVENOUS; SUBCUTANEOUS at 11:28

## 2022-09-18 RX ADMIN — CHLORHEXIDINE GLUCONATE 120 ML: 4 LIQUID TOPICAL at 08:54

## 2022-09-18 RX ADMIN — ATORVASTATIN CALCIUM 80 MG: 40 TABLET, FILM COATED ORAL at 20:16

## 2022-09-18 RX ADMIN — INSULIN LISPRO 2 UNITS: 100 INJECTION, SOLUTION INTRAVENOUS; SUBCUTANEOUS at 16:32

## 2022-09-18 RX ADMIN — DEXTROSE MONOHYDRATE 100 MG: 50 INJECTION, SOLUTION INTRAVENOUS at 14:39

## 2022-09-18 RX ADMIN — LEVOTHYROXINE SODIUM 75 MCG: 75 TABLET ORAL at 05:54

## 2022-09-18 RX ADMIN — CARVEDILOL 6.25 MG: 6.25 TABLET, FILM COATED ORAL at 16:26

## 2022-09-18 RX ADMIN — Medication: at 14:39

## 2022-09-18 RX ADMIN — FERROUS SULFATE TAB 325 MG (65 MG ELEMENTAL FE) 325 MG: 325 (65 FE) TAB at 08:54

## 2022-09-18 RX ADMIN — Medication 10 MG: at 20:16

## 2022-09-18 RX ADMIN — AMLODIPINE BESYLATE 10 MG: 10 TABLET ORAL at 08:54

## 2022-09-18 RX ADMIN — ACETAMINOPHEN 650 MG: 325 TABLET ORAL at 05:53

## 2022-09-18 RX ADMIN — ONDANSETRON 4 MG: 4 TABLET, ORALLY DISINTEGRATING ORAL at 18:21

## 2022-09-18 RX ADMIN — ASPIRIN 81 MG CHEWABLE TABLET 81 MG: 81 TABLET CHEWABLE at 08:54

## 2022-09-18 RX ADMIN — PANTOPRAZOLE SODIUM 40 MG: 40 TABLET, DELAYED RELEASE ORAL at 05:53

## 2022-09-18 RX ADMIN — CARVEDILOL 6.25 MG: 6.25 TABLET, FILM COATED ORAL at 08:54

## 2022-09-18 RX ADMIN — CEFIDEROCOL SULFATE TOSYLATE 1000 MG: 1 INJECTION, POWDER, FOR SOLUTION INTRAVENOUS at 03:51

## 2022-09-18 RX ADMIN — CEFIDEROCOL SULFATE TOSYLATE 1000 MG: 1 INJECTION, POWDER, FOR SOLUTION INTRAVENOUS at 11:23

## 2022-09-18 RX ADMIN — SODIUM CHLORIDE, PRESERVATIVE FREE 10 ML: 5 INJECTION INTRAVENOUS at 20:17

## 2022-09-18 RX ADMIN — SODIUM CHLORIDE 25 ML: 9 INJECTION, SOLUTION INTRAVENOUS at 11:22

## 2022-09-18 RX ADMIN — ACETAMINOPHEN 650 MG: 325 TABLET ORAL at 20:16

## 2022-09-18 RX ADMIN — SODIUM CHLORIDE 25 ML: 9 INJECTION, SOLUTION INTRAVENOUS at 14:38

## 2022-09-18 RX ADMIN — Medication: at 20:16

## 2022-09-18 RX ADMIN — CEFIDEROCOL SULFATE TOSYLATE 1000 MG: 1 INJECTION, POWDER, FOR SOLUTION INTRAVENOUS at 20:21

## 2022-09-18 RX ADMIN — Medication: at 08:54

## 2022-09-18 RX ADMIN — FERROUS SULFATE TAB 325 MG (65 MG ELEMENTAL FE) 325 MG: 325 (65 FE) TAB at 16:26

## 2022-09-18 RX ADMIN — SODIUM CHLORIDE, PRESERVATIVE FREE 10 ML: 5 INJECTION INTRAVENOUS at 08:59

## 2022-09-18 NOTE — PROGRESS NOTES
V2.0  [facility] [place]      Name:  Sophia Russ /Age/Sex: 1940  (78 y.o. male)   MRN & CSN:  [id number] & [id number] Encounter Date/Time: 2022 6:54 PM EDT    Location:  [address] [place] [address] PCP: Orin Valdes MD       Hospital Day:     Assessment and Plan:   Sophia Russ is a 78 y.o. male with pmh of prostate cancer, diabetes mellitus, hypertension who admitted with sciatic found out with Complicated UTI (urinary tract infection)    #Candida albicans fungemia  #Candida UTI  -Transthoracic echo inconclusive  - Spoke to ophthalmology, recommended checking visual acuity, per my evaluation, patient's vision is 20/20 bilaterally (with glasses on)  -No growth on catheter tip culture for 36-48h  -Repeat Bcx with no growth at 24h  -RAFFI negative for vegetations  -Negative repeat blood cultures and central line tip culture  -Positive MRSA screen    Plan:  ID following, appreciate recs  Continue Eraxis (started ) x2 weeks total     #ESBL bacteremia in the setting of UTI  #Complicated UTI  - Urine culture on  growing E. coli resistant to Cipro and cefepime  - Urine culture on  no significant growth  - CT abdomen/pelvis showing left ureteral stent in place, mild bilateral urinary tract dilatation with new right perinephric and periureteral stranding raises concern for UTI    Plan:  Continue cefiderocol x2 weeks total  ID following, appreciated recs     #Chronic urinary retention managed with suprapubic catheter and exchanged monthly, last exchange  with a cystoscopy patient had hematuria and patient became lethargic and drowsy with elevated leukocytes discussed with ID started on vancomycin and fluconazole and  obtaining blood cultures and urine cultures urine and blood cultures came back positive for Candida albicans treated as above  # Hematuria s/p cystoscopy and exchange of stent  -Urology now following peripherally, recommended to manually irrigate bladder every 4 hours and as needed clots     #Patient with RUE swelling-Improving  -ultrasound RUE negative for DVT Likely due to volume overload in the setting of PAMELLA    Chronic medical problems:      # Normocytic anemia   - Continue with H&H  - Transfuse if hemoglobin less than 7     # History of prostate cancer s/p prostatectomy 1996    #Hypertension  Plan: continue home meds     # Hypothyroidism   Plan: continue levothyroxine     # Type 2 diabetes  Continue SSI, hypoglycemic protocol diabetic diet      Diet ADULT DIET; Regular; 4 carb choices (60 gm/meal)  ADULT ORAL NUTRITION SUPPLEMENT; Breakfast, Dinner; Diabetic Oral Supplement   DVT Prophylaxis [x] Lovenox, []  Heparin, [] SCDs, [] Ambulation,  [] Eliquis, [] Xarelto  [] Coumadin   Code Status Full Code   Disposition From: Home  Expected Disposition:  SNF  Estimated Date of Discharge: Greater than 2 days  Patient requires continued admission due to candidemia, UTI requiring IV antibiotics -->having difficulty finding placement because meds are expensive   Surrogate Decision Maker/ JO-ANN Palafox. Subjective:     Chief Complaint: Fatigue (General weakness started this am. Family called because pt had changed from baseline. Currently being tx for UTI. Pt is slightly confused, doesn't know the year. He has been N/V. )     Patient states he feels okay. Vision is unchanged. Review of Systems:    General: No fever, no chills  Eyes: No blurry vision  Heart: No chest pain, no palpitations  Lungs: No shortness of breath, no cough  Abdomen: No abdominal pain, no nausea, no vomiting, no constipation, no diarrhea  : No frequency, no dysuria, no urgency, no decreased urination  MSK: No lower extremity swelling  Neuro: No confusion, no weakness  Skin: No rashes    Objective:      Intake/Output Summary (Last 24 hours) at 9/18/2022 1327  Last data filed at 9/18/2022 0859  Gross per 24 hour   Intake 130 ml   Output 850 ml   Net -720 ml        Vitals: Vitals:    09/18/22 0730   BP: (!) 143/67   Pulse: 70   Resp: 18   Temp: 98 °F (36.7 °C)   SpO2: 97%       Physical Exam:     General: NAD, AAO x3-4  Eyes: EOMI  HENT: Atraumatic  Resp: no respiratory distress  GI: Normal bowel sounds, soft, nondistended, nontender  MSK: Normal ROM, no lower extremity edema, bilateral hand swelling  Skin: Intact, dry, warm, no rashes  Neuro: CN II to XII grossly intact  Psych: Normal mood    Medications:   Medications:    insulin glargine  10 Units SubCUTAneous Nightly    chlorhexidine gluconate   Topical Daily    mupirocin   Nasal TID    insulin lispro  0-8 Units SubCUTAneous 2 times per day    anidulafungin  100 mg IntraVENous Q24H    insulin lispro  0-8 Units SubCUTAneous TID WC    Cefiderocol Sulfate Tosylate  1,000 mg IntraVENous Q8H    lidocaine PF  5 mL IntraDERmal Once    sodium chloride flush  5-40 mL IntraVENous 2 times per day    meperidine  12.5 mg IntraVENous Once    ferrous sulfate  325 mg Oral BID WC    carvedilol  6.25 mg Oral BID WC    lidocaine  1 patch TransDERmal Daily    amLODIPine  10 mg Oral Daily    aspirin  81 mg Oral Daily    atorvastatin  80 mg Oral Nightly    levothyroxine  75 mcg Oral Daily    melatonin  10 mg Oral Nightly    pantoprazole  40 mg Oral QAM AC    [Held by provider] enoxaparin  40 mg SubCUTAneous QPM      Infusions:    sodium chloride      sodium chloride      sodium chloride 25 mL (09/18/22 1122)    sodium chloride      dextrose      sodium chloride 25 mL (09/06/22 2327)    dextrose       PRN Meds: sodium chloride, , PRN  bisacodyl, 10 mg, Daily PRN  docusate sodium, 100 mg, BID PRN  polyethylene glycol, 17 g, Daily PRN  sodium chloride, , PRN  sodium chloride flush, 5-40 mL, PRN  sodium chloride, , PRN  sodium chloride flush, 5-40 mL, PRN  sodium chloride, 25 mL, PRN  HYDROmorphone, 0.25 mg, Q5 Min PRN  fentanNYL, 50 mcg, Q5 Min PRN  glucose, 4 tablet, PRN  dextrose bolus, 125 mL, PRN   Or  dextrose bolus, 250 mL, PRN  glucagon (rDNA), 1 mg, PRN  dextrose, , Continuous PRN  guaiFENesin-dextromethorphan, 5 mL, Q4H PRN  magnesium hydroxide, 30 mL, Daily PRN  sodium chloride flush, 5-40 mL, PRN  sodium chloride, , PRN  ondansetron, 4 mg, Q8H PRN   Or  ondansetron, 4 mg, Q6H PRN  aluminum & magnesium hydroxide-simethicone, 30 mL, Q6H PRN  acetaminophen, 650 mg, Q6H PRN   Or  acetaminophen, 650 mg, Q6H PRN  glucose, 4 tablet, PRN  dextrose bolus, 125 mL, PRN   Or  dextrose bolus, 250 mL, PRN  glucagon (rDNA), 1 mg, PRN  dextrose, , Continuous PRN  ipratropium-albuterol, 1 vial, BID PRN      Labs      Recent Results (from the past 24 hour(s))   POCT Glucose    Collection Time: 09/17/22  4:53 PM   Result Value Ref Range    POC Glucose 156 (H) 70 - 99 MG/DL   POCT Glucose    Collection Time: 09/17/22  7:50 PM   Result Value Ref Range    POC Glucose 199 (H) 70 - 99 MG/DL   POCT Glucose    Collection Time: 09/18/22  3:55 AM   Result Value Ref Range    POC Glucose 153 (H) 70 - 99 MG/DL   Comprehensive Metabolic Panel    Collection Time: 09/18/22  5:39 AM   Result Value Ref Range    Sodium 143 135 - 145 MMOL/L    Potassium 3.7 3.5 - 5.1 MMOL/L    Chloride 104 99 - 110 mMol/L    CO2 30 21 - 32 MMOL/L    BUN 28 (H) 6 - 23 MG/DL    Creatinine 2.2 (H) 0.9 - 1.3 MG/DL    Glucose 150 (H) 70 - 99 MG/DL    Calcium 8.4 8.3 - 10.6 MG/DL    Albumin 3.0 (L) 3.4 - 5.0 GM/DL    Total Protein 5.9 (L) 6.4 - 8.2 GM/DL    Total Bilirubin 0.3 0.0 - 1.0 MG/DL    ALT 12 10 - 40 U/L    AST 23 15 - 37 IU/L    Alkaline Phosphatase 95 40 - 129 IU/L    GFR Non- 29 (L) >60 mL/min/1.73m2    GFR  35 (L) >60 mL/min/1.73m2    Anion Gap 9 4 - 16   POCT Glucose    Collection Time: 09/18/22  8:08 AM   Result Value Ref Range    POC Glucose 163 (H) 70 - 99 MG/DL   POCT Glucose    Collection Time: 09/18/22 11:26 AM   Result Value Ref Range    POC Glucose 208 (H) 70 - 99 MG/DL        Imaging/Diagnostics Last 24 Hours   Echocardiogram complete 2D with doppler with color    Result Date: 9/12/2022  Transthoracic Echocardiography Report (TTE)  Demographics   Patient Name      Jerzy MARCIAL     Date of Study       09/12/2022   Date of Birth     1940         Gender              Male   Age               78 year(s)         Race                Black   Patient Number    [id number]         Room Number         2006   Visit Number      [id number]   Corporate ID      [id number]   Accession Number  [id number]         Sonographer         [employer], [facility]   Ordering          [address], [place]  Physician         NORA MICHELLE-JOHNNY         Physician           MD  Procedure Type of Study   TTE procedure:ECHOCARDIOGRAM COMPLETE 2D W DOPPLER W COLOR. Procedure Date Date: 09/12/2022 Start: 02:23 PM Study Location: Portable Technical Quality: Adequate visualization Indications:Endocarditis. Patient Status: Routine Height: 68 inches Weight: 210 pounds BSA: 2.09 m2 BMI: 31.93 kg/m2 HR: 68 bpm BP: 141/57 mmHg  Conclusions   Summary  Left ventricular systolic function is normal.  Ejection fraction is visually estimated at 60%. Grade II diastolic dysfunction. Cannot rule out endocarditis on the tricuspid valve. No evidence of any pericardial effusion. Signature   ------------------------------------------------------------------  Electronically signed by Ewa Flor MD (Interpreting  physician) on 09/12/2022 at 06:43 PM  ------------------------------------------------------------------   Findings   Left Ventricle  Left ventricular systolic function is normal.  Ejection fraction is visually estimated at 60%. No regional wall motion abnormalites. Grade II diastolic dysfunction. Left ventricle size is normal.   Left Atrium  Essentially normal left atrium. Right Atrium  Essentially normal right atrium. Right Ventricle  Essentially normal right ventricle.    Aortic Valve  Sclerotic, but non-stenotic aortic valve. No significant valvular disease noted. Mitral Valve  Structurally normal mitral valve. Mild mitral regurgitation. Tricuspid Valve  Tricuspid valve is structurally normal.  Mild tricuspid regurgitation. Cannot rule out endocarditis on the tricuspid valve. Pulmonic Valve  Pulmonic valve is structurally normal.  No significant valvular disease noted. Pericardial Effusion  No evidence of any pericardial effusion. Pleural Effusion  No evidence of pleural effusion. Miscellaneous  IVC and abdominal aorta are within normal limits.   M-Mode/2D Measurements & Calculations   LV Diastolic Dimension:  LV Systolic Dimension:  LA Dimension: 3.7 cmAO Root  3.68 cm                  2.44 cm                 Dimension: 3.3 cmLA Area:  LV FS:33.7 %             LV Volume Diastolic: 62 04.7 cm2  LV PW Diastolic: 1.3 cm  ml  LV PW Systolic: 1.50 cm  LV Volume Systolic: 26  Septum Diastolic: 9.76   ml  cm                       LV EDV/LV EDV Index: 62  Septum Systolic: 5.09 cm CY/25 T2HN ESV/LV ESV   LA/Aorta: 1.12  CO: 4.47 l/min           Index: 26 ml/12 m2      Ascending Aorta: 3.4 cm  CI: 2.14 l/m*m2          EF Calculated (A4C):    LA volume/Index: 27 ml                           58.1 %                  /21Z2  LV Area Diastolic: 24    EF Calculated (2D):  cm2                      63.4 %  LV Area Systolic: 98.3  cm2                      LV Length: 7.77 cm                            LVOT: 2.1 cm  Doppler Measurements & Calculations   MV Peak E-Wave: 110     AV Peak Velocity: 150 cm/s  LVOT Peak Velocity: 88  cm/s                    AV Peak Gradient: 9 mmHg    cm/s  MV Peak A-Wave: 89.3    AV Mean Velocity: 107 cm/s  LVOT Mean Velocity: 65  cm/s                    AV Mean Gradient: 5 mmHg    cm/s  MV E/A Ratio: 1.23      AV VTI: 31.9 cm             LVOT Peak Gradient: 3  MV Peak Gradient: 4.84  AV Area (Continuity):2.06   mmHgLVOT Mean Gradient:  mmHg                    cm2                         2 mmHg  MV Mean Gradient: 4                                 Estimated RVSP: 35 mmHg  mmHg                    LVOT VTI: 19 cm             Estimated RAP:3 mmHg  MV Mean Velocity: 90.2  cm/s                    Estimated PASP: 35.49 mmHg  MV Deceleration Time:                               TR Velocity:285 cm/s  203 msec                                            TR Gradient:32.49 mmHg   MV Area (continuity):  1.67 cm2  MV E' Septal Velocity:  8.33 cm/s  MV A' Septal Velocity:  7.13 cm/s  MV E' Lateral Velocity:  8.11 cm/s  MV A' Lateral Velocity:  9.32 cm/s  MV E/E' septal: 13.21  MV E/E' lateral: 13.56        Electronically signed by Jose Antonio Cruz MD on 9/18/2022 at 1:27 PM

## 2022-09-18 NOTE — PLAN OF CARE
baseline comfort level:   Encourage patient to monitor pain and request assistance   Implement non-pharmacological measures as appropriate and evaluate response   Administer analgesics based on type and severity of pain and evaluate response   Assess pain using appropriate pain scale   Consider cultural and social influences on pain and pain management   Notify Licensed Independent Practitioner if interventions unsuccessful or patient reports new pain     Problem: Safety - Adult  Goal: Free from fall injury  Outcome: Progressing  Flowsheets (Taken 9/18/2022 0904)  Free From Fall Injury: Instruct family/caregiver on patient safety     Problem: Nutrition Deficit:  Goal: Optimize nutritional status  Outcome: Progressing

## 2022-09-18 NOTE — PROGRESS NOTES
Nephrology Progress Note  9/18/2022 10:42 AM        Subjective:   Admit Date: 8/28/2022  PCP: Jose García MD    Interval History: Patient seen in early morning, this is a late entry  Slowly improving    Diet: Reasonable    ROS: No shortness of breath or confusion  Urine output recorded only 850 cc, likely not recorded accurately  No fever, acceptable blood pressure    Data:     Current meds:    insulin glargine  10 Units SubCUTAneous Nightly    chlorhexidine gluconate   Topical Daily    mupirocin   Nasal TID    insulin lispro  0-8 Units SubCUTAneous 2 times per day    anidulafungin  100 mg IntraVENous Q24H    insulin lispro  0-8 Units SubCUTAneous TID WC    Cefiderocol Sulfate Tosylate  1,000 mg IntraVENous Q8H    lidocaine PF  5 mL IntraDERmal Once    sodium chloride flush  5-40 mL IntraVENous 2 times per day    meperidine  12.5 mg IntraVENous Once    ferrous sulfate  325 mg Oral BID WC    carvedilol  6.25 mg Oral BID WC    lidocaine  1 patch TransDERmal Daily    amLODIPine  10 mg Oral Daily    aspirin  81 mg Oral Daily    atorvastatin  80 mg Oral Nightly    levothyroxine  75 mcg Oral Daily    melatonin  10 mg Oral Nightly    pantoprazole  40 mg Oral QAM AC    [Held by provider] enoxaparin  40 mg SubCUTAneous QPM      sodium chloride      sodium chloride      sodium chloride Stopped (09/17/22 2010)    sodium chloride      dextrose      sodium chloride 25 mL (09/06/22 2327)    dextrose           I/O last 3 completed shifts: In: 130 [P.O.:120;  I.V.:10]  Out: 1400 [Urine:1400]    CBC:   Recent Labs     09/16/22  0900   WBC 8.7   HGB 9.1*             Recent Labs     09/16/22  0859 09/18/22  0539    143   K 3.9 3.7    104   CO2 29 30   BUN 29* 28*   CREATININE 2.6* 2.2*   GLUCOSE 91 150*       Lab Results   Component Value Date    CALCIUM 8.4 09/18/2022    PHOS 3.1 09/13/2022       Objective:     Vitals: BP (!) 143/67   Pulse 70   Temp 98 °F (36.7 °C) (Oral)   Resp 18   Ht 5' 8\" (1.727 m) Wt 204 lb 2.3 oz (92.6 kg)   SpO2 97%   BMI 31.04 kg/m² ,    General appearance: Alert, awake and oriented  HEENT: Positive conjunctival pallor  Neck: Supple  Lungs:  Few rhonchi no crackles  Heart: Seems regular rate and rhythm  Abdomen: Soft, suprapubic catheter  Extremities: Trace right leg edema      Problem List :         Impression :     Acute kidney injury on top of CKD stage III-fortunately slowly improving  Infected left ureteral stent which has been exchanged-but still has risk for recurrent infection  Underlying prostate cancer, diabetes and multifactoral anemia  Prolonged hospital stay    Recommendation/Plan  :     Good glycemic control  Watch for iatrogenic nosocomial complication  From kidney standpoint he can be discharged  need a CMP, magnesium, phosphorus couple of weeks after discharge  Follow-up with me in 3 to 4 weeks      Tina Sun MD MD

## 2022-09-18 NOTE — PROGRESS NOTES
Progress Note( Dr. Porfirio Belle)  9/18/2022  Subjective:   Admit Date: 8/28/2022  PCP: Sapphire Clark MD    Admitted For :Fatigue and possible sepsis from UTI    Consulted For: Control of blood glucose as patient has having hypoglycemic episodes    Interval History: feeling Somewhat  tired  had  no more episode of low blood glucose yesterday  Patient septic with fungemia . in addition to Bbacteremia      Pt had Following Urology procedure done early morng hour spf 9/8/2022  Cystoscopy, left ureteral stent exchange,  suprapubic tube exchange. Patient had RAFFI done and was negative for bacterial endocarditis with a vegetation  Patient was transferred back to medical floor 9/16/2022    Denies any chest pains,   Denies SOB . Denies nausea or vomiting. now eating better  No new bowel or bladder symptoms. Intake/Output Summary (Last 24 hours) at 9/18/2022 1344  Last data filed at 9/18/2022 0859  Gross per 24 hour   Intake 130 ml   Output 850 ml   Net -720 ml         DATA    CBC:   Recent Labs     09/16/22  0900   WBC 8.7   HGB 9.1*         CMP:  Recent Labs     09/16/22  0859 09/18/22  0539    143   K 3.9 3.7    104   CO2 29 30   BUN 29* 28*   CREATININE 2.6* 2.2*   CALCIUM 8.5 8.4   PROT 6.2* 5.9*   LABALBU 3.0* 3.0*   BILITOT 0.3 0.3   ALKPHOS 107 95   AST 27 23   ALT 11 12       Lipids: No results found for: CHOL, HDL, TRIG  Glucose:  Recent Labs     09/18/22  0355 09/18/22  0808 09/18/22  1126   POCGLU 153* 163* 208*       [id number]:  Lab Results   Component Value Date/Time    LABA1C 7.8 08/29/2022 01:30 AM     High Sensitivity TSH:   Lab Results   Component Value Date/Time    TSHHS 7.590 04/28/2022 06:59 AM     Free T3: No results found for: FT3  Free T4:  Lab Results   Component Value Date/Time    T4FREE 1.15 03/27/2022 08:22 AM       CT ABDOMEN PELVIS WO CONTRAST Additional Contrast? None   Final Result   1.  Probable hematuria left urinary collecting system (possible active hemorrhage into the left urinary collecting system) and mild-to-moderate left   hydronephrosis. 2. The double-J stent appears in unremarkable position without left ureter   calculus evident. 3. Trace abdominopelvic ascites is probably reactive. 4.  Vascular calcifications are noted reflecting calcific atherosclerosis. 5. Trace left pleural effusion with left basilar relaxation atelectasis or   infiltrate. 6. Minimal subsegmental atelectasis posterior right lung base. 7. Small hiatal hernia. XR CHEST 1 VIEW   Final Result   1. Right IJ line with the tip overlying the distal SVC/right atrial region. 2. Low lung volumes with bibasilar atelectasis. No significant change. XR CHEST PORTABLE   Final Result   Satisfactory position of the PICC. No acute cardiopulmonary process         VL DUP UPPER EXTREMITY VENOUS RIGHT   Final Result   No evidence of DVT within the right upper extremity. XR CHEST PORTABLE   Final Result   No acute process. XR ABDOMEN (KUB) (SINGLE AP VIEW)   Final Result   Prominent loops of bowel may represent persistent ileus or partial   obstruction. FL LESS THAN 1 HOUR   Final Result      XR CHEST PORTABLE   Final Result   No abnormalities noted. US ABDOMEN LIMITED Specify organ? LIVER, GALLBLADDER   Final Result   Unremarkable right upper quadrant ultrasound. CT ABDOMEN PELVIS WO CONTRAST Additional Contrast? None   Final Result   1. Left ureteral stent in place. Mild bilateral urinary tract dilatation   with new right perinephric and periureteral stranding raises concern for   urinary tract infection. No urinary stones. 2.  Suprapubic catheter in the decompressed urinary bladder. 3.  Ill-defined stranding/fluid throughout the retroperitoneum may relate to   vascular congestion or reactive changes. No intraperitoneal free air or   abscess. XR CHEST PORTABLE   Final Result   Increased interstitial opacity. While much or all of this may be chronic,   please correlate with any clinical evidence of acute interstitial edema. Scheduled Medicines   Medications:    insulin glargine  10 Units SubCUTAneous Nightly    chlorhexidine gluconate   Topical Daily    mupirocin   Nasal TID    insulin lispro  0-8 Units SubCUTAneous 2 times per day    anidulafungin  100 mg IntraVENous Q24H    insulin lispro  0-8 Units SubCUTAneous TID WC    Cefiderocol Sulfate Tosylate  1,000 mg IntraVENous Q8H    lidocaine PF  5 mL IntraDERmal Once    sodium chloride flush  5-40 mL IntraVENous 2 times per day    meperidine  12.5 mg IntraVENous Once    ferrous sulfate  325 mg Oral BID WC    carvedilol  6.25 mg Oral BID WC    lidocaine  1 patch TransDERmal Daily    amLODIPine  10 mg Oral Daily    aspirin  81 mg Oral Daily    atorvastatin  80 mg Oral Nightly    levothyroxine  75 mcg Oral Daily    melatonin  10 mg Oral Nightly    pantoprazole  40 mg Oral QAM AC    [Held by provider] enoxaparin  40 mg SubCUTAneous QPM      Infusions:    sodium chloride      sodium chloride      sodium chloride 25 mL (09/18/22 1122)    sodium chloride      dextrose      sodium chloride 25 mL (09/06/22 8247)    dextrose           Objective:   Vitals: BP (!) 143/67   Pulse 70   Temp 98 °F (36.7 °C) (Oral)   Resp 18   Ht 5' 8\" (1.727 m)   Wt 204 lb 2.3 oz (92.6 kg)   SpO2 97%   BMI 31.04 kg/m²   General appearance: alert and cooperative with exam  Neck: no JVD or bruit  Thyroid : Normal lobes   Lungs: Has Vesicular Breath sounds   Heart:  regular rate and rhythm  Abdomen: soft, non-tender; bowel sounds normal; no masses,  no organomegaly has suprapubic catheter   Musculoskeletal: Normal  Extremities: extremities normal, , no edema  Neurologic:  Awake, alert, oriented to name, place and time. Cranial nerves II-XII are grossly intact. Motor weakness. Sensory neuropathy. ,  and gait is abnormal.  Stable    Assessment:     Patient Active Problem List:     Gait disturbance     Generalized weakness     Diabetes mellitus (HCC)     Osteoarthritis     Anemia of chronic disorder     Infected implanted bladder sphincter cuff     Escherichia coli urinary tract infection     Hypertension     Sepsis ([address]     Acute encephalopathy     Type 2 diabetes mellitus with hypoglycemia without coma (HCC)     UTI (urinary tract infection) due to urinary indwelling catheter (HCC)     Hyperkalemia     Acute kidney failure (HCC)     Leg weakness, bilateral     Type 2 diabetes mellitus with neurological manifestations, uncontrolled ([address]     Complicated UTI (urinary tract infection)     Essential hypertension     Severe muscle deconditioning     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 (HCC)     Frequent falls     Chronic kidney disease, stage 3a ([address]     Uncontrolled type 2 diabetes mellitus with peripheral neuropathy (HCC)     Prostate cancer (HCC)     Suprapubic catheter ([address]     Sepsis due to urinary tract infection ([address]     Coagulase negative Staphylococcus bacteremia     Chronic kidney disease, stage IV (severe) (HCC)     Acute metabolic encephalopathy     SVT (supraventricular tachycardia) (HCC)     Metabolic encephalopathy     History of prostate cancer     Cigarette nicotine dependence without complication     Altered mental status     Serratia marcescens infection     Hypoglycemia         Plan:     Reviewed POC blood glucose . Labs and X ray results   Reviewed Current Medicines   On Correction bolus Humalog/ Basal Lantus ( On Hold )Insulin regime /   Monitor Blood glucose frequently   Modified  the dose of Insulin/ other medicines as neede  Management is working to show the patient to skilled nursing unit possibly [facility]  Will follow     .      Amy Artis MD, MD

## 2022-09-19 LAB
ANION GAP SERPL CALCULATED.3IONS-SCNC: 12 MMOL/L (ref 4–16)
BASOPHILS ABSOLUTE: 0.1 K/CU MM
BASOPHILS RELATIVE PERCENT: 0.7 % (ref 0–1)
BUN BLDV-MCNC: 28 MG/DL (ref 6–23)
CALCIUM SERPL-MCNC: 8.6 MG/DL (ref 8.3–10.6)
CHLORIDE BLD-SCNC: 103 MMOL/L (ref 99–110)
CO2: 28 MMOL/L (ref 21–32)
CREAT SERPL-MCNC: 2.3 MG/DL (ref 0.9–1.3)
DIFFERENTIAL TYPE: ABNORMAL
EOSINOPHILS ABSOLUTE: 0.7 K/CU MM
EOSINOPHILS RELATIVE PERCENT: 7.9 % (ref 0–3)
GFR AFRICAN AMERICAN: 33 ML/MIN/1.73M2
GFR NON-AFRICAN AMERICAN: 27 ML/MIN/1.73M2
GLUCOSE BLD-MCNC: 111 MG/DL (ref 70–99)
GLUCOSE BLD-MCNC: 140 MG/DL (ref 70–99)
GLUCOSE BLD-MCNC: 142 MG/DL (ref 70–99)
GLUCOSE BLD-MCNC: 173 MG/DL (ref 70–99)
GLUCOSE BLD-MCNC: 177 MG/DL (ref 70–99)
GLUCOSE BLD-MCNC: 219 MG/DL (ref 70–99)
HCT VFR BLD CALC: 30.2 % (ref 42–52)
HEMOGLOBIN: 9.8 GM/DL (ref 13.5–18)
IMMATURE NEUTROPHIL %: 0.2 % (ref 0–0.43)
LYMPHOCYTES ABSOLUTE: 1.4 K/CU MM
LYMPHOCYTES RELATIVE PERCENT: 17.3 % (ref 24–44)
MAGNESIUM: 1.9 MG/DL (ref 1.8–2.4)
MCH RBC QN AUTO: 30.3 PG (ref 27–31)
MCHC RBC AUTO-ENTMCNC: 32.5 % (ref 32–36)
MCV RBC AUTO: 93.5 FL (ref 78–100)
MONOCYTES ABSOLUTE: 0.6 K/CU MM
MONOCYTES RELATIVE PERCENT: 6.7 % (ref 0–4)
NUCLEATED RBC %: 0 %
PDW BLD-RTO: 14.8 % (ref 11.7–14.9)
PHOSPHORUS: 3 MG/DL (ref 2.5–4.9)
PLATELET # BLD: 351 K/CU MM (ref 140–440)
PMV BLD AUTO: 9.8 FL (ref 7.5–11.1)
POTASSIUM SERPL-SCNC: 3.8 MMOL/L (ref 3.5–5.1)
RBC # BLD: 3.23 M/CU MM (ref 4.6–6.2)
SEGMENTED NEUTROPHILS ABSOLUTE COUNT: 5.5 K/CU MM
SEGMENTED NEUTROPHILS RELATIVE PERCENT: 67.2 % (ref 36–66)
SODIUM BLD-SCNC: 143 MMOL/L (ref 135–145)
TOTAL IMMATURE NEUTOROPHIL: 0.02 K/CU MM
TOTAL NUCLEATED RBC: 0 K/CU MM
WBC # BLD: 8.2 K/CU MM (ref 4–10.5)

## 2022-09-19 PROCEDURE — 2580000003 HC RX 258: Performed by: STUDENT IN AN ORGANIZED HEALTH CARE EDUCATION/TRAINING PROGRAM

## 2022-09-19 PROCEDURE — 6370000000 HC RX 637 (ALT 250 FOR IP): Performed by: INTERNAL MEDICINE

## 2022-09-19 PROCEDURE — 84100 ASSAY OF PHOSPHORUS: CPT

## 2022-09-19 PROCEDURE — 94761 N-INVAS EAR/PLS OXIMETRY MLT: CPT

## 2022-09-19 PROCEDURE — 82962 GLUCOSE BLOOD TEST: CPT

## 2022-09-19 PROCEDURE — 99232 SBSQ HOSP IP/OBS MODERATE 35: CPT | Performed by: NURSE PRACTITIONER

## 2022-09-19 PROCEDURE — 85025 COMPLETE CBC W/AUTO DIFF WBC: CPT

## 2022-09-19 PROCEDURE — 80048 BASIC METABOLIC PNL TOTAL CA: CPT

## 2022-09-19 PROCEDURE — 36415 COLL VENOUS BLD VENIPUNCTURE: CPT

## 2022-09-19 PROCEDURE — 1200000000 HC SEMI PRIVATE

## 2022-09-19 PROCEDURE — 6360000002 HC RX W HCPCS: Performed by: NURSE PRACTITIONER

## 2022-09-19 PROCEDURE — 2580000003 HC RX 258: Performed by: NURSE PRACTITIONER

## 2022-09-19 PROCEDURE — 6370000000 HC RX 637 (ALT 250 FOR IP): Performed by: SPECIALIST

## 2022-09-19 PROCEDURE — 83735 ASSAY OF MAGNESIUM: CPT

## 2022-09-19 PROCEDURE — 6370000000 HC RX 637 (ALT 250 FOR IP): Performed by: NURSE PRACTITIONER

## 2022-09-19 PROCEDURE — 94150 VITAL CAPACITY TEST: CPT

## 2022-09-19 RX ORDER — SODIUM CHLORIDE 0.9 % (FLUSH) 0.9 %
5-40 SYRINGE (ML) INJECTION EVERY 12 HOURS SCHEDULED
Status: DISCONTINUED | OUTPATIENT
Start: 2022-09-19 | End: 2022-09-21 | Stop reason: HOSPADM

## 2022-09-19 RX ORDER — FLUCONAZOLE 2 MG/ML
200 INJECTION, SOLUTION INTRAVENOUS EVERY 24 HOURS
Status: DISCONTINUED | OUTPATIENT
Start: 2022-09-19 | End: 2022-09-19

## 2022-09-19 RX ORDER — LIDOCAINE HYDROCHLORIDE 10 MG/ML
5 INJECTION, SOLUTION EPIDURAL; INFILTRATION; INTRACAUDAL; PERINEURAL ONCE
Status: DISCONTINUED | OUTPATIENT
Start: 2022-09-19 | End: 2022-09-21 | Stop reason: HOSPADM

## 2022-09-19 RX ORDER — SODIUM CHLORIDE 9 MG/ML
INJECTION, SOLUTION INTRAVENOUS PRN
Status: DISCONTINUED | OUTPATIENT
Start: 2022-09-19 | End: 2022-09-21 | Stop reason: HOSPADM

## 2022-09-19 RX ORDER — SODIUM CHLORIDE 0.9 % (FLUSH) 0.9 %
5-40 SYRINGE (ML) INJECTION PRN
Status: DISCONTINUED | OUTPATIENT
Start: 2022-09-19 | End: 2022-09-21 | Stop reason: HOSPADM

## 2022-09-19 RX ORDER — FLUCONAZOLE 100 MG/1
400 TABLET ORAL DAILY
Status: DISCONTINUED | OUTPATIENT
Start: 2022-09-19 | End: 2022-09-21 | Stop reason: HOSPADM

## 2022-09-19 RX ADMIN — LEVOTHYROXINE SODIUM 75 MCG: 75 TABLET ORAL at 05:34

## 2022-09-19 RX ADMIN — CARVEDILOL 6.25 MG: 6.25 TABLET, FILM COATED ORAL at 09:46

## 2022-09-19 RX ADMIN — INSULIN GLARGINE 10 UNITS: 100 INJECTION, SOLUTION SUBCUTANEOUS at 22:24

## 2022-09-19 RX ADMIN — FLUCONAZOLE 400 MG: 100 TABLET ORAL at 12:47

## 2022-09-19 RX ADMIN — Medication: at 12:47

## 2022-09-19 RX ADMIN — FERROUS SULFATE TAB 325 MG (65 MG ELEMENTAL FE) 325 MG: 325 (65 FE) TAB at 17:53

## 2022-09-19 RX ADMIN — PANTOPRAZOLE SODIUM 40 MG: 40 TABLET, DELAYED RELEASE ORAL at 05:34

## 2022-09-19 RX ADMIN — Medication 10 MG: at 22:05

## 2022-09-19 RX ADMIN — CEFIDEROCOL SULFATE TOSYLATE 1000 MG: 1 INJECTION, POWDER, FOR SOLUTION INTRAVENOUS at 05:38

## 2022-09-19 RX ADMIN — Medication: at 09:45

## 2022-09-19 RX ADMIN — FERROUS SULFATE TAB 325 MG (65 MG ELEMENTAL FE) 325 MG: 325 (65 FE) TAB at 09:46

## 2022-09-19 RX ADMIN — SODIUM CHLORIDE, PRESERVATIVE FREE 10 ML: 5 INJECTION INTRAVENOUS at 23:38

## 2022-09-19 RX ADMIN — Medication: at 22:06

## 2022-09-19 RX ADMIN — ASPIRIN 81 MG CHEWABLE TABLET 81 MG: 81 TABLET CHEWABLE at 09:46

## 2022-09-19 RX ADMIN — CHLORHEXIDINE GLUCONATE: 4 LIQUID TOPICAL at 09:53

## 2022-09-19 RX ADMIN — SODIUM CHLORIDE, PRESERVATIVE FREE 10 ML: 5 INJECTION INTRAVENOUS at 22:06

## 2022-09-19 RX ADMIN — SODIUM CHLORIDE, PRESERVATIVE FREE 10 ML: 5 INJECTION INTRAVENOUS at 09:46

## 2022-09-19 RX ADMIN — MEROPENEM 2000 MG: 1 INJECTION, POWDER, FOR SOLUTION INTRAVENOUS at 12:48

## 2022-09-19 RX ADMIN — ATORVASTATIN CALCIUM 80 MG: 40 TABLET, FILM COATED ORAL at 22:06

## 2022-09-19 RX ADMIN — CARVEDILOL 6.25 MG: 6.25 TABLET, FILM COATED ORAL at 17:53

## 2022-09-19 RX ADMIN — AMLODIPINE BESYLATE 10 MG: 10 TABLET ORAL at 09:46

## 2022-09-19 RX ADMIN — INSULIN LISPRO 2 UNITS: 100 INJECTION, SOLUTION INTRAVENOUS; SUBCUTANEOUS at 12:47

## 2022-09-19 NOTE — PROGRESS NOTES
Nephrology Progress Note  9/19/2022 2:11 PM        Subjective:   Admit Date: 8/28/2022  PCP: Michelet Gutierrez MD    Interval History: Patient seen in early morning, this is a late entry    Diet: Eating reasonably well    ROS: No shortness of breath  No fever and acceptable blood pressure  Urine output recorded 1.225 L for the last 24 hours    Data:     Current meds:    [START ON 9/20/2022] meropenem  2,000 mg IntraVENous Q12H    lidocaine PF  5 mL IntraDERmal Once    sodium chloride flush  5-40 mL IntraVENous 2 times per day    fluconazole  400 mg Oral Daily    insulin glargine  10 Units SubCUTAneous Nightly    chlorhexidine gluconate   Topical Daily    mupirocin   Nasal TID    insulin lispro  0-8 Units SubCUTAneous 2 times per day    insulin lispro  0-8 Units SubCUTAneous TID WC    sodium chloride flush  5-40 mL IntraVENous 2 times per day    meperidine  12.5 mg IntraVENous Once    ferrous sulfate  325 mg Oral BID WC    carvedilol  6.25 mg Oral BID WC    lidocaine  1 patch TransDERmal Daily    amLODIPine  10 mg Oral Daily    aspirin  81 mg Oral Daily    atorvastatin  80 mg Oral Nightly    levothyroxine  75 mcg Oral Daily    melatonin  10 mg Oral Nightly    pantoprazole  40 mg Oral QAM AC    [Held by provider] enoxaparin  40 mg SubCUTAneous QPM      sodium chloride      sodium chloride 25 mL/hr at 09/19/22 1248    dextrose           I/O last 3 completed shifts: In: 250 [P.O.:240;  I.V.:10]  Out: 1225 [Urine:1225]    CBC:   Recent Labs     09/19/22  0930   WBC 8.2   HGB 9.8*             Recent Labs     09/18/22  0539 09/19/22  0930    143   K 3.7 3.8    103   CO2 30 28   BUN 28* 28*   CREATININE 2.2* 2.3*   GLUCOSE 150* 173*       Lab Results   Component Value Date    CALCIUM 8.6 09/19/2022    PHOS 3.0 09/19/2022       Objective:     Vitals: BP (!) 161/72   Pulse 66   Temp 97.2 °F (36.2 °C) (Oral)   Resp 16   Ht 5' 8\" (1.727 m)   Wt 211 lb 6.7 oz (95.9 kg)   SpO2 98%   BMI 32.15 kg/m² ,    General appearance: Alert, awake and oriented  HEENT: Positive conjunctival pallor  Neck: Supple  Lungs: No gross crackles on anterior exam  Heart: Seems regular rate and rhythm  Abdomen: Soft, very minimally tender on deep palpation, has suprapubic catheter  Extremities: No gross edema this morning      Problem List :         Impression :     Acute kidney injury with underlying CKD stage III-acceptable creatinine  Recent sepsis mainly from infected left ureteral stent which has been exchanged  Also has diabetes, prostate cancer  Multifactoral anemia needing transfusion    Recommendation/Plan  :     Okay to insert PICC-he is 78 years young, not planning on any vascular access such as fistula or graft for him  Hopefully he will kidney function will stay stable-  Close outpatient follow-up  Okay to discharge from my standpoint  CMP, magnesium, phosphorus prior to appointment with me in 2 to 3 weeks      Joelle Damon MD MD

## 2022-09-19 NOTE — CONSULTS
Consult completed. EDPIV in pt LUE proximal forearm Cephalic vein reassessed, remains patent, and meets therapeutic needs for OutPt Abx infusions through 9/27; site may be reevaluated per Clinician/Vascular Access again 10/19/22 and use extended through 11/13/22 per  & FDA guidelines if necessary. Sterile dressing change performed with WinGuard Securing Device, BioPatch, and new LuerLok cap/SwabCap. Pt education provided and he/family deny other c/o or needs.

## 2022-09-19 NOTE — PROGRESS NOTES
Infectious Disease Progress Note  2022   Patient Name: Claudeen Bare : 1940     Assessment  Sepsis secondary to ESBL E. coli pyelonephritis associated with suprapubic cystostomy. Candida albicans Fungemia Source Unclear,  Possible Endocarditis:     MRSA Screen Positive:    History of prostate cancer status post RPP. ESBL E coli bacteremia. S/p cystoscopy, left ureteral stent exchange and peritoneal mccrary catheter exchange on 2022. Fever has worsened, CRP and pct is elevated. raises question of persistent ESBL bacteremia, CRE, fungal UTI and pneumonia. Hematuria: post procedure  ?ileus  CKD stage IIIB  T2DM  Diabetic neuropathy     Plan  Therapeutic: -d/c meropenem 1 g q 12 hours . Continue IV Fetroja 1 gm q8h x 14 days (end date 22)  Issues with insurance financial coverage for Fetroja and Eraxis, therefore second choice of ABX: transitioned to IV meropenem 2 gm q12h (renal dosing) with end date 22, and transitioned IV Eraxis to po Diflucan 400 mg daily (per guidelines as step down therapy after Eraxis) with end date 22. De-colonize MRSA ordered: Bactroban ointment to nares x 5 days, Hibiclens baths daily x 10 days  Diagnostic: MRSA nares positive, await repeat [facility]   Recommend consult with ophthalmology to evaluate for possible endophthalmitis   Reviewed RAFFI, no evidence of endocarditis. Ordered PICC line for IV ABX access, DW Dr. Cydney Kanner, he is ok with PICC line    For DC: Follow up with ID in 2 weeks please  Weekly labs drawn on Monday during the course of treatment  CBC with differential, CMP, ESR, CRP  Fax results to Attn: Nirali Solomon Infectious Diseases Staff  # [phone]   OK from ID standpoint to DC when ready    Reason for visit: F/u ESBL E coli bacteremia and left pyelonephritis? History:? Interval history noted  Denies n/v/d/f or untoward effects of antimicrobials. Resting quietly, with intermittent confusion.      Physical Exam:  Vital Signs: BP (!) 155/77   Pulse 72   Temp 98.2 °F (36.8 °C) (Oral)   Resp 16   Ht 5' 8\" (1.727 m)   Wt 211 lb 6.7 oz (95.9 kg)   SpO2 98%   BMI 32.15 kg/m²     Gen:  Alert, no distress  HEMT: AT/NC Oropharynx pink, moist, and without lesions or exudates; dentition in good state of repair  Eyes: PERRLA, EOMI, conjunctiva pink, sclera anicteric. Neck: Supple. Trachea midline. No LAD. Chest: no distress and CTA. Good air movement. Heart: RRR and no MRG. Abd: suprapubic catheter, soft, gaseous distension, no tenderness, no hepatomegaly. Normoactive bowel sounds. Ext: no clubbing, cyanosis, or edema  Catheter Site: without erythema or tenderness  Neuro: Mental status intact. CN 2-12 intact and no focal sensory or motor deficits     Radiologic / Imaging / TESTING  9/10/22 XR Chest:  Impression   1. Right IJ line with the tip overlying the distal SVC/right atrial region. 2. Low lung volumes with bibasilar atelectasis. No significant change. 9/10/22 CT Abdomen Pelvis WO Contrast:  Impression   1. Probable hematuria left urinary collecting system (possible active   hemorrhage into the left urinary collecting system) and mild-to-moderate left   hydronephrosis. 2. The double-J stent appears in unremarkable position without left ureter   calculus evident. 3. Trace abdominopelvic ascites is probably reactive. 4.  Vascular calcifications are noted reflecting calcific atherosclerosis. 5. Trace left pleural effusion with left basilar relaxation atelectasis or   infiltrate. 6. Minimal subsegmental atelectasis posterior right lung base. 7. Small hiatal hernia. 9/12/22 Echo Complete:   Summary   Left ventricular systolic function is normal.   Ejection fraction is visually estimated at 60%. Grade II diastolic dysfunction. Cannot rule out endocarditis on the tricuspid valve. No evidence of any pericardial effusion.     Labs:    Recent Results (from the past 24 hour(s))   POCT Glucose    Collection Time: 09/18/22 11:26 AM   Result Value Ref Range    POC Glucose 208 (H) 70 - 99 MG/DL   POCT Glucose    Collection Time: 09/18/22  4:30 PM   Result Value Ref Range    POC Glucose 200 (H) 70 - 99 MG/DL   POCT Glucose    Collection Time: 09/18/22  8:00 PM   Result Value Ref Range    POC Glucose 155 (H) 70 - 99 MG/DL   POCT Glucose    Collection Time: 09/19/22  2:27 AM   Result Value Ref Range    POC Glucose 142 (H) 70 - 99 MG/DL   POCT Glucose    Collection Time: 09/19/22  8:01 AM   Result Value Ref Range    POC Glucose 140 (H) 70 - 99 MG/DL   Magnesium    Collection Time: 09/19/22  9:30 AM   Result Value Ref Range    Magnesium 1.9 1.8 - 2.4 mg/dl   Phosphorus    Collection Time: 09/19/22  9:30 AM   Result Value Ref Range    Phosphorus 3.0 2.5 - 4.9 MG/DL   Basic Metabolic Panel    Collection Time: 09/19/22  9:30 AM   Result Value Ref Range    Sodium 143 135 - 145 MMOL/L    Potassium 3.8 3.5 - 5.1 MMOL/L    Chloride 103 99 - 110 mMol/L    CO2 28 21 - 32 MMOL/L    Anion Gap 12 4 - 16    BUN 28 (H) 6 - 23 MG/DL    Creatinine 2.3 (H) 0.9 - 1.3 MG/DL    Glucose 173 (H) 70 - 99 MG/DL    Calcium 8.6 8.3 - 10.6 MG/DL    GFR Non- 27 (L) >60 mL/min/1.73m2    GFR  33 (L) >60 mL/min/1.73m2     CULTURE results: Invalid input(s): BLOOD CULTURE,  URINE CULTURE, SURGICAL CULTURE    Diagnosis:  Patient Active Problem List   Diagnosis    Gait disturbance    Generalized weakness    Diabetes mellitus (HCC)    Osteoarthritis    Anemia of chronic disorder    Infected implanted bladder sphincter cuff    Escherichia coli urinary tract infection    Hypertension    Sepsis ([facility] [address]    Acute encephalopathy    Type 2 diabetes mellitus with hypoglycemia without coma (HCC)    UTI (urinary tract infection) due to urinary indwelling catheter (HCC)    Hyperkalemia    Acute kidney failure (HCC)    Leg weakness, bilateral    Type 2 diabetes mellitus with neurological manifestations, uncontrolled (HCC)    Complicated UTI (urinary tract infection)    Essential hypertension    Severe muscle deconditioning    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 (HCC)    Frequent falls    Chronic kidney disease, stage 3a ([address]    Uncontrolled type 2 diabetes mellitus with peripheral neuropathy (HCC)    Prostate cancer ([facility] [address]    Suprapubic catheter ([facility] [address]    Sepsis due to urinary tract infection ([facility] [address]    Coagulase negative Staphylococcus bacteremia    Chronic kidney disease, stage IV (severe) (HCC)    Acute metabolic encephalopathy    SVT (supraventricular tachycardia) (HCC)    Metabolic encephalopathy    History of prostate cancer    Cigarette nicotine dependence without complication    Altered mental status    Serratia marcescens infection    Hypoglycemia    Fungemia       Active Problems  Principal Problem:    Complicated UTI (urinary tract infection)  Active Problems:    Anemia of chronic disorder    Fungemia    Chronic kidney disease, stage 3a (HCC)    SVT (supraventricular tachycardia) (HCC)  Resolved Problems:    * No resolved hospital problems. *              Electronically signed by: Electronically signed by VIVIANA Bueno CNP on 9/19/2022 at 10:32 AM

## 2022-09-19 NOTE — PROGRESS NOTES
V2.0  [facility] [place]      Name:  Zain Diop /Age/Sex: 1940  (78 y.o. male)   MRN & CSN:  [id number] & [id number] Encounter Date/Time: 2022 6:54 PM EDT    Location:  [address] [place] [address] PCP: Du Richard MD       Hospital Day: 23    Assessment and Plan:   Zain Diop is a 78 y.o. male with pmh of prostate cancer, diabetes mellitus, hypertension who admitted with sciatic found out with Complicated UTI (urinary tract infection)    #Candida albicans fungemia  #Candida UTI  -Transthoracic echo inconclusive  - Spoke to ophthalmology, recommended checking visual acuity, per my evaluation, patient's vision is 20/20 bilaterally (with glasses on)  -No growth on catheter tip culture for 36-48h  -Repeat Bcx with no growth at 24h  -RAFFI negative for vegetations  -Negative repeat blood cultures and central line tip culture  -Positive MRSA screen  -Eraxis -    Plan:  ID following, appreciate recs  Switched to fluconazole with EOT      #ESBL bacteremia in the setting of UTI  #Complicated UTI  - Urine culture on  growing E. coli resistant to Cipro and cefepime  - Urine culture on  no significant growth  - CT abdomen/pelvis showing left ureteral stent in place, mild bilateral urinary tract dilatation with new right perinephric and periureteral stranding raises concern for UTI  -Was on cefiderocol     Plan:  Switched to meropenem with EOT   ID following, appreciated recs     #Chronic urinary retention managed with suprapubic catheter and exchanged monthly, last exchange  with a cystoscopy patient had hematuria and patient became lethargic and drowsy with elevated leukocytes discussed with ID started on vancomycin and fluconazole and  obtaining blood cultures and urine cultures urine and blood cultures came back positive for Candida albicans treated as above  # Hematuria s/p cystoscopy and exchange of stent  -Urology now following peripherally, recommended to manually irrigate bladder every 4 hours and as needed clots     #Patient with RUE swelling-Improving  -ultrasound RUE negative for DVT Likely due to volume overload in the setting of PAMELLA    Chronic medical problems:      # Normocytic anemia   - Continue with H&H  - Transfuse if hemoglobin less than 7     # History of prostate cancer s/p prostatectomy 1996    #Hypertension  Plan: continue home meds     # Hypothyroidism   Plan: continue levothyroxine     # Type 2 diabetes  Continue SSI, hypoglycemic protocol diabetic diet      Diet ADULT DIET; Regular; 4 carb choices (60 gm/meal)  ADULT ORAL NUTRITION SUPPLEMENT; Breakfast, Dinner; Diabetic Oral Supplement   DVT Prophylaxis [x] Lovenox, []  Heparin, [] SCDs, [] Ambulation,  [] Eliquis, [] Xarelto  [] Coumadin   Code Status Full Code   Disposition From: Home  Expected Disposition:  SNF  Estimated Date of Discharge: Greater than 2 days  Patient requires continued admission due to candidemia, UTI requiring IV antibiotics -->having difficulty finding placement because meds are expensive   Surrogate Decision Maker/ JO-ANN Brady. Subjective:     Chief Complaint: Fatigue (General weakness started this am. Family called because pt had changed from baseline. Currently being tx for UTI. Pt is slightly confused, doesn't know the year. He has been N/V. )     Patient feels well today. No complaints    Review of Systems:    General: No fever, no chills  Eyes: No blurry vision  Heart: No chest pain, no palpitations  Lungs: No shortness of breath, no cough  Abdomen: No abdominal pain, no nausea, no vomiting, no constipation, no diarrhea  : No frequency, no dysuria, no urgency, no decreased urination  MSK: No lower extremity swelling  Neuro: No confusion, no weakness  Skin: No rashes    Objective:      Intake/Output Summary (Last 24 hours) at 9/19/2022 1426  Last data filed at 9/19/2022 0905  Gross per 24 hour   Intake 240 ml   Output 1225 ml   Net -985 ml        Vitals:   Vitals:    09/19/22 1314   BP: (!) 161/72   Pulse: 66   Resp: 16   Temp: 97.2 °F (36.2 °C)   SpO2: 98%       Physical Exam:     General: NAD, AAO x3-4  Eyes: EOMI  HENT: Atraumatic  Resp: no respiratory distress  GI: Normal bowel sounds, soft, nondistended, nontender  MSK: Normal ROM, no lower extremity edema, bilateral hand swelling  Skin: Intact, dry, warm, no rashes  Neuro: CN II to XII grossly intact  Psych: Normal mood    Medications:   Medications:    [START ON 9/20/2022] meropenem  2,000 mg IntraVENous Q12H    lidocaine PF  5 mL IntraDERmal Once    sodium chloride flush  5-40 mL IntraVENous 2 times per day    fluconazole  400 mg Oral Daily    insulin glargine  10 Units SubCUTAneous Nightly    chlorhexidine gluconate   Topical Daily    mupirocin   Nasal TID    insulin lispro  0-8 Units SubCUTAneous 2 times per day    insulin lispro  0-8 Units SubCUTAneous TID WC    sodium chloride flush  5-40 mL IntraVENous 2 times per day    meperidine  12.5 mg IntraVENous Once    ferrous sulfate  325 mg Oral BID WC    carvedilol  6.25 mg Oral BID WC    lidocaine  1 patch TransDERmal Daily    amLODIPine  10 mg Oral Daily    aspirin  81 mg Oral Daily    atorvastatin  80 mg Oral Nightly    levothyroxine  75 mcg Oral Daily    melatonin  10 mg Oral Nightly    pantoprazole  40 mg Oral QAM AC    [Held by provider] enoxaparin  40 mg SubCUTAneous QPM      Infusions:    sodium chloride      sodium chloride 25 mL/hr at 09/19/22 1248    dextrose       PRN Meds: sodium chloride flush, 5-40 mL, PRN  sodium chloride, , PRN  bisacodyl, 10 mg, Daily PRN  docusate sodium, 100 mg, BID PRN  polyethylene glycol, 17 g, Daily PRN  sodium chloride flush, 5-40 mL, PRN  sodium chloride, , PRN  sodium chloride flush, 5-40 mL, PRN  HYDROmorphone, 0.25 mg, Q5 Min PRN  fentanNYL, 50 mcg, Q5 Min PRN  guaiFENesin-dextromethorphan, 5 mL, Q4H PRN  magnesium hydroxide, 30 mL, Daily PRN  sodium chloride flush, 5-40 mL, PRN  ondansetron, 4 mg, Q8H PRN   Or  ondansetron, 4 mg, Q6H PRN  aluminum & magnesium hydroxide-simethicone, 30 mL, Q6H PRN  acetaminophen, 650 mg, Q6H PRN   Or  acetaminophen, 650 mg, Q6H PRN  glucose, 4 tablet, PRN  dextrose bolus, 125 mL, PRN   Or  dextrose bolus, 250 mL, PRN  glucagon (rDNA), 1 mg, PRN  dextrose, , Continuous PRN  ipratropium-albuterol, 1 vial, BID PRN      Labs      Recent Results (from the past 24 hour(s))   POCT Glucose    Collection Time: 09/18/22  4:30 PM   Result Value Ref Range    POC Glucose 200 (H) 70 - 99 MG/DL   POCT Glucose    Collection Time: 09/18/22  8:00 PM   Result Value Ref Range    POC Glucose 155 (H) 70 - 99 MG/DL   POCT Glucose    Collection Time: 09/19/22  2:27 AM   Result Value Ref Range    POC Glucose 142 (H) 70 - 99 MG/DL   POCT Glucose    Collection Time: 09/19/22  8:01 AM   Result Value Ref Range    POC Glucose 140 (H) 70 - 99 MG/DL   CBC with Auto Differential    Collection Time: 09/19/22  9:30 AM   Result Value Ref Range    WBC 8.2 4.0 - 10.5 K/CU MM    RBC 3.23 (L) 4.6 - 6.2 M/CU MM    Hemoglobin 9.8 (L) 13.5 - 18.0 GM/DL    Hematocrit 30.2 (L) 42 - 52 %    MCV 93.5 78 - 100 FL    MCH 30.3 27 - 31 PG    MCHC 32.5 32.0 - 36.0 %    RDW 14.8 11.7 - 14.9 %    Platelets 923 591 - 338 K/CU MM    MPV 9.8 7.5 - 11.1 FL    Differential Type AUTOMATED DIFFERENTIAL     Segs Relative 67.2 (H) 36 - 66 %    Lymphocytes % 17.3 (L) 24 - 44 %    Monocytes % 6.7 (H) 0 - 4 %    Eosinophils % 7.9 (H) 0 - 3 %    Basophils % 0.7 0 - 1 %    Segs Absolute 5.5 K/CU MM    Lymphocytes Absolute 1.4 K/CU MM    Monocytes Absolute 0.6 K/CU MM    Eosinophils Absolute 0.7 K/CU MM    Basophils Absolute 0.1 K/CU MM    Nucleated RBC % 0.0 %    Total Nucleated RBC 0.0 K/CU MM    Total Immature Neutrophil 0.02 K/CU MM    Immature Neutrophil % 0.2 0 - 0.43 %   Magnesium    Collection Time: 09/19/22  9:30 AM   Result Value Ref Range    Magnesium 1.9 1.8 - 2.4 mg/dl   Phosphorus    Collection Time: 09/19/22  9:30 AM   Result Value Ref Range    Phosphorus 3.0 2.5 - 4.9 MG/DL   Basic Metabolic Panel    Collection Time: 09/19/22  9:30 AM   Result Value Ref Range    Sodium 143 135 - 145 MMOL/L    Potassium 3.8 3.5 - 5.1 MMOL/L    Chloride 103 99 - 110 mMol/L    CO2 28 21 - 32 MMOL/L    Anion Gap 12 4 - 16    BUN 28 (H) 6 - 23 MG/DL    Creatinine 2.3 (H) 0.9 - 1.3 MG/DL    Glucose 173 (H) 70 - 99 MG/DL    Calcium 8.6 8.3 - 10.6 MG/DL    GFR Non- 27 (L) >60 mL/min/1.73m2    GFR  33 (L) >60 mL/min/1.73m2   POCT Glucose    Collection Time: 09/19/22 11:58 AM   Result Value Ref Range    POC Glucose 219 (H) 70 - 99 MG/DL        Imaging/Diagnostics Last 24 Hours   Echocardiogram complete 2D with doppler with color    Result Date: 9/12/2022  Transthoracic Echocardiography Report (TTE)  Demographics   Patient Name      Ashu MARCIAL     Date of Study       09/12/2022   Date of Birth     1940         Gender              Male   Age               78 year(s)         Race                Black   Patient Number    [id number]         Room Number         2006   Visit Number      [id number]   Corporate ID      [id number]   Accession Number  [id number]         Sonographer         Carlson McKean, [place] [address] [facility]  Physician         NORA MICHELLE-JOHNNY         Physician           MD  Procedure Type of Study   TTE procedure:ECHOCARDIOGRAM COMPLETE 2D W DOPPLER W COLOR. Procedure Date Date: 09/12/2022 Start: 02:23 PM Study Location: Portable Technical Quality: Adequate visualization Indications:Endocarditis. Patient Status: Routine Height: 68 inches Weight: 210 pounds BSA: 2.09 m2 BMI: 31.93 kg/m2 HR: 68 bpm BP: 141/57 mmHg  Conclusions   Summary  Left ventricular systolic function is normal.  Ejection fraction is visually estimated at 60%. Grade II diastolic dysfunction.   Cannot rule out endocarditis on the tricuspid valve. No evidence of any pericardial effusion. Signature   ------------------------------------------------------------------  Electronically signed by Tamara Patterson MD (Interpreting  physician) on 09/12/2022 at 06:43 PM  ------------------------------------------------------------------   Findings   Left Ventricle  Left ventricular systolic function is normal.  Ejection fraction is visually estimated at 60%. No regional wall motion abnormalites. Grade II diastolic dysfunction. Left ventricle size is normal.   Left Atrium  Essentially normal left atrium. Right Atrium  Essentially normal right atrium. Right Ventricle  Essentially normal right ventricle. Aortic Valve  Sclerotic, but non-stenotic aortic valve. No significant valvular disease noted. Mitral Valve  Structurally normal mitral valve. Mild mitral regurgitation. Tricuspid Valve  Tricuspid valve is structurally normal.  Mild tricuspid regurgitation. Cannot rule out endocarditis on the tricuspid valve. Pulmonic Valve  Pulmonic valve is structurally normal.  No significant valvular disease noted. Pericardial Effusion  No evidence of any pericardial effusion. Pleural Effusion  No evidence of pleural effusion. Miscellaneous  IVC and abdominal aorta are within normal limits.   M-Mode/2D Measurements & Calculations   LV Diastolic Dimension:  LV Systolic Dimension:  LA Dimension: 3.7 cmAO Root  3.68 cm                  2.44 cm                 Dimension: 3.3 cmLA Area:  LV FS:33.7 %             LV Volume Diastolic: 62 21.8 cm2  LV PW Diastolic: 1.3 cm  ml  LV PW Systolic: 3.02 cm  LV Volume Systolic: 26  Septum Diastolic: 4.36   ml  cm                       LV EDV/LV EDV Index: 62  Septum Systolic: 1.39 cm MH/08 [id number] ESV/LV ESV   LA/Aorta: 1.12  CO: 4.47 l/min           Index: 26 ml/12 m2      Ascending Aorta: 3.4 cm  CI: 2.14 l/m*m2          EF Calculated (A4C):    LA volume/Index: 27 ml 58.1 %                  /71G6  LV Area Diastolic: 24    EF Calculated (2D):  cm2                      63.4 %  LV Area Systolic: 83.4  cm2                      LV Length: 7.77 cm                            LVOT: 2.1 cm  Doppler Measurements & Calculations   MV Peak E-Wave: 110     AV Peak Velocity: 150 cm/s  LVOT Peak Velocity: 88  cm/s                    AV Peak Gradient: 9 mmHg    cm/s  MV Peak A-Wave: 89.3    AV Mean Velocity: 107 cm/s  LVOT Mean Velocity: 65  cm/s                    AV Mean Gradient: 5 mmHg    cm/s  MV E/A Ratio: 1.23      AV VTI: 31.9 cm             LVOT Peak Gradient: 3  MV Peak Gradient: 4.84  AV Area (Continuity):2.06   mmHgLVOT Mean Gradient:  mmHg                    cm2                         2 mmHg  MV Mean Gradient: 4                                 Estimated RVSP: 35 mmHg  mmHg                    LVOT VTI: 19 cm             Estimated RAP:3 mmHg  MV Mean Velocity: 90.2  cm/s                    Estimated PASP: 35.49 mmHg  MV Deceleration Time:                               TR Velocity:285 cm/s  203 msec                                            TR Gradient:32.49 mmHg   MV Area (continuity):  1.67 cm2  MV E' Septal Velocity:  8.33 cm/s  MV A' Septal Velocity:  7.13 cm/s  MV E' Lateral Velocity:  8.11 cm/s  MV A' Lateral Velocity:  9.32 cm/s  MV E/E' septal: 13.21  MV E/E' lateral: 13.56        Electronically signed by Gardiner Alpers, MD on 9/19/2022 at 2:26 PM

## 2022-09-19 NOTE — PROGRESS NOTES
Comprehensive Nutrition Assessment    Type and Reason for Visit:  Reassess    Nutrition Recommendations/Plan:   Continue current diet and supplement regimen as ordered  Please document all po intake  Will monitor po intake, weight trends, poc     Malnutrition Assessment:  Malnutrition Status: At risk for malnutrition (Comment) (09/08/22 1229)      Nutrition Assessment:    pt remains on 4 carb choice diet with diabetic oral nutrition supplement ordered, po intake appears improved from last RD assessment (~%), noted plan for IV ABX at d/c, will continue to follow at moderate nutrition risk    Nutrition Related Findings:    BUN 83, Cr 2.3, Glucose 173 Wound Type: None       Current Nutrition Intake & Therapies:    Average Meal Intake: 51-75%, %, 26-50%  Average Supplements Intake: Unable to assess    Anthropometric Measures:  Height: 5' 7.99\" (172.7 cm)  Ideal Body Weight (IBW): 154 lbs (70 kg)    Admission Body Weight: 198 lb (89.8 kg) (stated)  Current Body Weight: 211 lb 6.7 oz (95.9 kg), 137.3 % IBW. Weight Source: Bed Scale  Current BMI (kg/m2): 32.2  Usual Body Weight: 210 lb (95.3 kg) (Feb 2022)  % Weight Change (Calculated): -1.9  Weight Adjustment For: No Adjustment  BMI Categories: Obese Class 1 (BMI 30.0-34. 9)    Estimated Daily Nutrient Needs:  Energy Requirements Based On: Formula  Weight Used for Energy Requirements: Current  Energy (kcal/day): 2079  Weight Used for Protein Requirements: Ideal  Protein (g/day):   Method Used for Fluid Requirements: 1 ml/kcal  Fluid (ml/day): 2079    Nutrition Diagnosis:   Predicted inadequate energy intake related to increase demand for energy/nutrients (lethargy and sepsis) as evidenced by weight loss    Nutrition Interventions:   Food and/or Nutrient Delivery: Continue Current Diet, Continue Oral Nutrition Supplement  Nutrition Education/Counseling: No recommendation at this time  Coordination of Nutrition Care: Continue to monitor while inpatient  Plan of Care discussed with: patient    Goals:  Previous Goal Met: Progressing toward Goal(s)  Goals: PO intake 75% or greater, by next RD assessment       Nutrition Monitoring and Evaluation:   Behavioral-Environmental Outcomes: None Identified  Food/Nutrient Intake Outcomes: Food and Nutrient Intake, Supplement Intake  Physical Signs/Symptoms Outcomes: Biochemical Data, GI Status, Hemodynamic Status, Fluid Status or Edema, Weight, Meal Time Behavior    Discharge Planning:     Too soon to determine     Parag Gordon [address], [address],   Contact: [phone]

## 2022-09-19 NOTE — CARE COORDINATION
Reviewed chart for continued discharge planning- pt needs SNF however has been denied due to cost of IV Abx- sent message to ESTELLA Tate and she states: \"I will De-escalate both to meropenem (end date 9/24) and Diflucan (end date of 9/27)\". TC to [facility] CARE [place] [facility] with update, she will review and get back with me- pt remains medically stable for discharge once disposition finalized. TC to [facility], she states she is still waiting final decision from administration on if they can accept pt. She will check again and call me back.

## 2022-09-20 LAB
GLUCOSE BLD-MCNC: 109 MG/DL (ref 70–99)
GLUCOSE BLD-MCNC: 124 MG/DL (ref 70–99)
GLUCOSE BLD-MCNC: 161 MG/DL (ref 70–99)
GLUCOSE BLD-MCNC: 175 MG/DL (ref 70–99)
GLUCOSE BLD-MCNC: 184 MG/DL (ref 70–99)

## 2022-09-20 PROCEDURE — 97530 THERAPEUTIC ACTIVITIES: CPT

## 2022-09-20 PROCEDURE — 6370000000 HC RX 637 (ALT 250 FOR IP): Performed by: INTERNAL MEDICINE

## 2022-09-20 PROCEDURE — 6370000000 HC RX 637 (ALT 250 FOR IP): Performed by: NURSE PRACTITIONER

## 2022-09-20 PROCEDURE — 2580000003 HC RX 258: Performed by: NURSE PRACTITIONER

## 2022-09-20 PROCEDURE — 6370000000 HC RX 637 (ALT 250 FOR IP): Performed by: SPECIALIST

## 2022-09-20 PROCEDURE — 97535 SELF CARE MNGMENT TRAINING: CPT

## 2022-09-20 PROCEDURE — 6370000000 HC RX 637 (ALT 250 FOR IP): Performed by: STUDENT IN AN ORGANIZED HEALTH CARE EDUCATION/TRAINING PROGRAM

## 2022-09-20 PROCEDURE — 2580000003 HC RX 258: Performed by: STUDENT IN AN ORGANIZED HEALTH CARE EDUCATION/TRAINING PROGRAM

## 2022-09-20 PROCEDURE — 94761 N-INVAS EAR/PLS OXIMETRY MLT: CPT

## 2022-09-20 PROCEDURE — 97110 THERAPEUTIC EXERCISES: CPT

## 2022-09-20 PROCEDURE — 94150 VITAL CAPACITY TEST: CPT

## 2022-09-20 PROCEDURE — 1200000000 HC SEMI PRIVATE

## 2022-09-20 PROCEDURE — 82962 GLUCOSE BLOOD TEST: CPT

## 2022-09-20 PROCEDURE — 6360000002 HC RX W HCPCS: Performed by: NURSE PRACTITIONER

## 2022-09-20 PROCEDURE — 99232 SBSQ HOSP IP/OBS MODERATE 35: CPT | Performed by: NURSE PRACTITIONER

## 2022-09-20 RX ORDER — PHENYLEPHRINE HCL 2.5 %
1 DROPS OPHTHALMIC (EYE)
Status: COMPLETED | OUTPATIENT
Start: 2022-09-20 | End: 2022-09-20

## 2022-09-20 RX ORDER — TROPICAMIDE 10 MG/ML
1 SOLUTION/ DROPS OPHTHALMIC
Status: DISPENSED | OUTPATIENT
Start: 2022-09-20 | End: 2022-09-20

## 2022-09-20 RX ORDER — TROPICAMIDE 10 MG/ML
1 SOLUTION/ DROPS OPHTHALMIC
Status: COMPLETED | OUTPATIENT
Start: 2022-09-20 | End: 2022-09-20

## 2022-09-20 RX ORDER — PHENYLEPHRINE HCL 2.5 %
1 DROPS OPHTHALMIC (EYE)
Status: DISPENSED | OUTPATIENT
Start: 2022-09-20 | End: 2022-09-20

## 2022-09-20 RX ADMIN — ATORVASTATIN CALCIUM 80 MG: 40 TABLET, FILM COATED ORAL at 21:38

## 2022-09-20 RX ADMIN — MEROPENEM 2000 MG: 1 INJECTION, POWDER, FOR SOLUTION INTRAVENOUS at 00:46

## 2022-09-20 RX ADMIN — SODIUM CHLORIDE, PRESERVATIVE FREE 10 ML: 5 INJECTION INTRAVENOUS at 21:37

## 2022-09-20 RX ADMIN — CARVEDILOL 6.25 MG: 6.25 TABLET, FILM COATED ORAL at 16:59

## 2022-09-20 RX ADMIN — PANTOPRAZOLE SODIUM 40 MG: 40 TABLET, DELAYED RELEASE ORAL at 06:13

## 2022-09-20 RX ADMIN — LEVOTHYROXINE SODIUM 75 MCG: 75 TABLET ORAL at 06:13

## 2022-09-20 RX ADMIN — Medication 10 MG: at 21:38

## 2022-09-20 RX ADMIN — MEROPENEM 2000 MG: 1 INJECTION, POWDER, FOR SOLUTION INTRAVENOUS at 11:57

## 2022-09-20 RX ADMIN — FLUCONAZOLE 400 MG: 100 TABLET ORAL at 08:19

## 2022-09-20 RX ADMIN — TROPICAMIDE 1 DROP: 10 SOLUTION/ DROPS OPHTHALMIC at 14:24

## 2022-09-20 RX ADMIN — CHLORHEXIDINE GLUCONATE 120 ML: 4 LIQUID TOPICAL at 11:08

## 2022-09-20 RX ADMIN — FERROUS SULFATE TAB 325 MG (65 MG ELEMENTAL FE) 325 MG: 325 (65 FE) TAB at 08:19

## 2022-09-20 RX ADMIN — PHENYLEPHRINE HYDROCHLORIDE 1 DROP: 25 SOLUTION/ DROPS OPHTHALMIC at 14:24

## 2022-09-20 RX ADMIN — PHENYLEPHRINE HYDROCHLORIDE 1 DROP: 25 SOLUTION/ DROPS OPHTHALMIC at 14:07

## 2022-09-20 RX ADMIN — SODIUM CHLORIDE, PRESERVATIVE FREE 10 ML: 5 INJECTION INTRAVENOUS at 10:09

## 2022-09-20 RX ADMIN — AMLODIPINE BESYLATE 10 MG: 10 TABLET ORAL at 08:19

## 2022-09-20 RX ADMIN — CARVEDILOL 6.25 MG: 6.25 TABLET, FILM COATED ORAL at 08:19

## 2022-09-20 RX ADMIN — FERROUS SULFATE TAB 325 MG (65 MG ELEMENTAL FE) 325 MG: 325 (65 FE) TAB at 16:59

## 2022-09-20 RX ADMIN — TROPICAMIDE 1 DROP: 10 SOLUTION/ DROPS OPHTHALMIC at 14:16

## 2022-09-20 RX ADMIN — PHENYLEPHRINE HYDROCHLORIDE 1 DROP: 25 SOLUTION/ DROPS OPHTHALMIC at 14:16

## 2022-09-20 RX ADMIN — DOCUSATE SODIUM 100 MG: 100 CAPSULE, LIQUID FILLED ORAL at 21:38

## 2022-09-20 RX ADMIN — TROPICAMIDE 1 DROP: 10 SOLUTION/ DROPS OPHTHALMIC at 14:06

## 2022-09-20 RX ADMIN — ASPIRIN 81 MG CHEWABLE TABLET 81 MG: 81 TABLET CHEWABLE at 08:19

## 2022-09-20 ASSESSMENT — PAIN SCALES - GENERAL: PAINLEVEL_OUTOF10: 0

## 2022-09-20 NOTE — CARE COORDINATION
TC to [facility], spoke with Radha Kay and pt is approved to admit today.

## 2022-09-20 NOTE — PROGRESS NOTES
Physical Therapy  Name: Jael Bennett MRN: [id number] :   1940   Date:  2022   Admission Date: 2022 Room:  [address] [place] [address]   Restrictions/Precautions:        contact precautions, falls    Communication with other providers:  Appropriate per chart review. Cotx with Butch Isbell per patient tolerance with rehab and patient safety. Handoff to RN and STNA on matheus back to bed for safety    Subjective:  Patient states:  Patient is agreeable for rehab. Patient expresses his apprehension with OOB mobility today. Education on these therapists ability to keep patient safe. Pain:   Location, Type, Intensity (0/10 to 10/10): Denies    Objective:    Observation:  Patient supine in bed upon arrival.     Treatment, including education/measures:    Transfers with line management of Suprapubic catheter, BP Cuff, Tele Monitor  Rolling: Max A x 1 Lt side. Supine to sit :Mod A x 2 with sequential sequencing with management of BLE off Eob and trunk rise  Seated balance: Good-Fair+ with BUE support needed on EOB. Ea UE manually placed on EOB. Sit to stand: Mod A x 2 initially from elevated EOB, progresses to Min A x 2 with completion. X 2 sets  SPT: Min A x 2 initially, patient becomes fearful and attempts to sit before safe, declines to Max A x 2 for hip guidance back to bed. Stand to sit: Mod A x 2 for safe descent    Seated exercises:  Resisted heel slides, resisted DF and SLR x 10    Safety  Patient left safely in the recliner, with matheus pad under patient, with call light/phone in reach with alarm applied. Gait belt and mask were used for transfers and gait. Assessment / Impression:   Patient has fair tolerance with today's activities. Was motivated to attempt resisted exercises after completing OOB.   Patient's tolerance of treatment:  Fair   Adverse Reaction: fatigue  Significant change in status and impact:  none  Barriers to improvement:  medical status    Plan for Next Session:    Will cont to work towards pt's goals per patient tolerance  Time in:  0928  Time out:  1008  Timed treatment minutes: 40  Total treatment time:  40    Previously filed items:  Social/Functional History  Lives With: Son  Home Equipment: Rollator  Short Term Goals  Time Frame for Short term goals: 2 weeks  Short term goal 1: Pt will perform sit><supine maxA  Short term goal 2: Pt will perform static sitting x 5 minutes SBA with BUE support  Short term goal 3: Pt will transfer between surfaces maxA       Electronically signed by:     Lina David PTA  9/20/2022, 10:12 AM

## 2022-09-20 NOTE — PROGRESS NOTES
Nephrology Progress Note  9/20/2022 2:48 PM        Subjective:   Admit Date: 8/28/2022  PCP: Du Richard MD    Interval History: Patient seen early morning, this is a late entry  No major event    Diet: Reasonable    ROS: No shortness of breath or confusion  Urine output recorded 1.335 L for the last 24 hours  No fever and acceptable blood pressure    Data:     Current meds:    meropenem  2,000 mg IntraVENous Q12H    lidocaine PF  5 mL IntraDERmal Once    sodium chloride flush  5-40 mL IntraVENous 2 times per day    fluconazole  400 mg Oral Daily    insulin glargine  10 Units SubCUTAneous Nightly    chlorhexidine gluconate   Topical Daily    insulin lispro  0-8 Units SubCUTAneous 2 times per day    insulin lispro  0-8 Units SubCUTAneous TID WC    sodium chloride flush  5-40 mL IntraVENous 2 times per day    meperidine  12.5 mg IntraVENous Once    ferrous sulfate  325 mg Oral BID WC    carvedilol  6.25 mg Oral BID WC    lidocaine  1 patch TransDERmal Daily    amLODIPine  10 mg Oral Daily    aspirin  81 mg Oral Daily    atorvastatin  80 mg Oral Nightly    levothyroxine  75 mcg Oral Daily    melatonin  10 mg Oral Nightly    pantoprazole  40 mg Oral QAM AC    [Held by provider] enoxaparin  40 mg SubCUTAneous QPM      sodium chloride      sodium chloride Stopped (09/19/22 1442)    dextrose           I/O last 3 completed shifts:   In: 360 [P.O.:360]  Out: 2560 [Urine:2560]    CBC:   Recent Labs     09/19/22  0930   WBC 8.2   HGB 9.8*             Recent Labs     09/18/22  0539 09/19/22  0930    143   K 3.7 3.8    103   CO2 30 28   BUN 28* 28*   CREATININE 2.2* 2.3*   GLUCOSE 150* 173*       Lab Results   Component Value Date    CALCIUM 8.6 09/19/2022    PHOS 3.0 09/19/2022       Objective:     Vitals: BP (!) 146/74   Pulse 66   Temp 97.7 °F (36.5 °C)   Resp 14   Ht 5' 7.99\" (1.727 m)   Wt 206 lb 9.1 oz (93.7 kg)   SpO2 98%   BMI 31.42 kg/m² ,    General appearance: Alert, awake and oriented  HEENT: At least 2+ conjunctival pallor  Neck: Supple  Lungs: No gross crackles  Heart: Regular rate and rhythm  Abdomen: Soft, suprapubic catheter  Extremities: No gross leg edema      Problem List :         Impression :     Acute kidney injury on top of CKD stage III-creatinine was stable few days ago-mainly from sepsis  Sepsis infected stent  Underlying diabetes and prostate cancer  Multifactorial anemia    Recommendation/Plan  :     Okay with PICC  Outpatient follow-up  He is waiting for ECF transfer  CMP, CBC weekly x5  Please have the [phone] home fax results to my hugrce-[phone]  I can do telephone or video visit at the nursing home if necessary      Kandice Solitario MD MD

## 2022-09-20 NOTE — PROGRESS NOTES
Infectious Disease Progress Note  2022   Patient Name: Kimberly Das : 1940     Assessment  Sepsis secondary to ESBL E. coli pyelonephritis associated with suprapubic cystostomy. Candida albicans Fungemia Source Unclear,  Possible Endocarditis:     MRSA Screen Positive:    History of prostate cancer status post RPP. ESBL E coli bacteremia. S/p cystoscopy, left ureteral stent exchange and peritoneal mccrary catheter exchange on 2022. Fever has worsened, CRP and pct is elevated. raises question of persistent ESBL bacteremia, CRE, fungal UTI and pneumonia. Hematuria: post procedure  ?ileus  CKD stage IIIB  T2DM  Diabetic neuropathy     Plan  Therapeutic: -d/c meropenem 1 g q 12 hours . Continue IV Fetroja 1 gm q8h x 14 days (end date 22)  Issues with insurance financial coverage for Fetroja and Eraxis, therefore second choice of ABX: transitioned to IV meropenem 2 gm q12h (renal dosing) with end date 22, and transitioned IV Eraxis to po Diflucan 400 mg daily (per guidelines as step down therapy after Eraxis) with end date 22. De-colonize MRSA ordered: Bactroban ointment to nares x 5 days, Hibiclens baths daily x 10 days  Diagnostic: MRSA nares positive, await repeat [facility]   Recommend consult with ophthalmology to evaluate for possible endophthalmitis   Reviewed RAFFI, no evidence of endocarditis. Extended dwell intact for IV ABX access    For DC: Follow up with ID in 2 weeks please  Weekly labs drawn on Monday during the course of treatment  CBC with differential, CMP, ESR, CRP  Fax results to Attn: Nirali Solomon Infectious Diseases Staff  # [phone]   OK from ID standpoint to DC when ready    Reason for visit: F/u ESBL E coli bacteremia and left pyelonephritis? History:? Interval history noted  Denies n/v/d/f or untoward effects of antimicrobials. Sitting up in the chair, states is feeling well today, finished with PT.      Physical Exam:  Vital Signs: BP (!) 163/62 Pulse 74   Temp 98.3 °F (36.8 °C) (Oral)   Resp 18   Ht 5' 7.99\" (1.727 m)   Wt 206 lb 9.1 oz (93.7 kg)   SpO2 97%   BMI 31.42 kg/m²     Gen:  A&O x 4, no distress  HEMT: AT/NC Oropharynx pink, moist, and without lesions or exudates; dentition in good state of repair  Eyes: PERRLA, EOMI, conjunctiva pink, sclera anicteric. Neck: Supple. Trachea midline. No LAD. Chest: no distress and CTA. Good air movement. Heart: RRR and no MRG. Abd: suprapubic catheter, soft, gaseous distension, no tenderness, no hepatomegaly. Normoactive bowel sounds. Ext: no clubbing, cyanosis, or edema  Catheter Site: without erythema or tenderness  Neuro: Mental status intact. CN 2-12 intact and no focal sensory or motor deficits     Radiologic / Imaging / TESTING  9/10/22 XR Chest:  Impression   1. Right IJ line with the tip overlying the distal SVC/right atrial region. 2. Low lung volumes with bibasilar atelectasis. No significant change. 9/10/22 CT Abdomen Pelvis WO Contrast:  Impression   1. Probable hematuria left urinary collecting system (possible active   hemorrhage into the left urinary collecting system) and mild-to-moderate left   hydronephrosis. 2. The double-J stent appears in unremarkable position without left ureter   calculus evident. 3. Trace abdominopelvic ascites is probably reactive. 4.  Vascular calcifications are noted reflecting calcific atherosclerosis. 5. Trace left pleural effusion with left basilar relaxation atelectasis or   infiltrate. 6. Minimal subsegmental atelectasis posterior right lung base. 7. Small hiatal hernia. 9/12/22 Echo Complete:   Summary   Left ventricular systolic function is normal.   Ejection fraction is visually estimated at 60%. Grade II diastolic dysfunction. Cannot rule out endocarditis on the tricuspid valve. No evidence of any pericardial effusion.     Labs:    Recent Results (from the past 24 hour(s))   POCT Glucose    Collection Time: 09/19/22 11:58 AM   Result Value Ref Range    POC Glucose 219 (H) 70 - 99 MG/DL   POCT Glucose    Collection Time: 09/19/22  4:56 PM   Result Value Ref Range    POC Glucose 111 (H) 70 - 99 MG/DL   POCT Glucose    Collection Time: 09/19/22  8:00 PM   Result Value Ref Range    POC Glucose 177 (H) 70 - 99 MG/DL   POCT Glucose    Collection Time: 09/20/22  4:08 AM   Result Value Ref Range    POC Glucose 109 (H) 70 - 99 MG/DL   POCT Glucose    Collection Time: 09/20/22  7:58 AM   Result Value Ref Range    POC Glucose 124 (H) 70 - 99 MG/DL     CULTURE results: Invalid input(s): BLOOD CULTURE,  URINE CULTURE, SURGICAL CULTURE    Diagnosis:  Patient Active Problem List   Diagnosis    Gait disturbance    Generalized weakness    Diabetes mellitus (HCC)    Osteoarthritis    Anemia of chronic disorder    Infected implanted bladder sphincter cuff    Escherichia coli urinary tract infection    Hypertension    Sepsis ([address]    Acute encephalopathy    Type 2 diabetes mellitus with hypoglycemia without coma (HCC)    UTI (urinary tract infection) due to urinary indwelling catheter (HCC)    Hyperkalemia    Acute kidney failure (HCC)    Leg weakness, bilateral    Type 2 diabetes mellitus with neurological manifestations, uncontrolled (HCC)    Complicated UTI (urinary tract infection)    Essential hypertension    Severe muscle deconditioning    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 (HCC)    Frequent falls    Chronic kidney disease, stage 3a (HCC)    Uncontrolled type 2 diabetes mellitus with peripheral neuropathy (HCC)    Prostate cancer (HCC)    Suprapubic catheter ([address]    Sepsis due to urinary tract infection (HCC)    Coagulase negative Staphylococcus bacteremia    Chronic kidney disease, stage IV (severe) (HCC)    Acute metabolic encephalopathy    SVT (supraventricular tachycardia) (HCC)    Metabolic encephalopathy    History of prostate cancer    Cigarette nicotine dependence without complication    Altered mental status    Serratia marcescens infection    Hypoglycemia    Fungemia       Active Problems  Principal Problem:    Complicated UTI (urinary tract infection)  Active Problems:    Anemia of chronic disorder    Fungemia    Chronic kidney disease, stage 3a (HCC)    SVT (supraventricular tachycardia) ([facility])  Resolved Problems:    * No resolved hospital problems. *              Electronically signed by: Electronically signed by Harshad Mead.  VIVIANA Tate CNP on 9/20/2022 at 11:21 AM

## 2022-09-20 NOTE — PROGRESS NOTES
Occupational Therapy  . Occupational Therapy Treatment Note    Name: Natalie Greer MRN: [id number] :   1940   Date:  2022   Admission Date: 2022 Room:  [address] [place] [address]-A     Primary Problem:      Restrictions/Precautions:          General precautions, fall risk      Communication with other providers: cotx with PTA Rubbie Lute for assist and safety as well as patient tolerance with therapy. Subjective:  Patient states:  are you guys just staying 5 min and leaving? Pain:   Location, Type, Intensity (0/10 to 10/10):  no numerical rating but c/o buttocks pain while supine. Objective:    Observation: patient supine. Holding phone in hands. Patient BUE still a little swollen. Agreeable and pleasant. Some anxiety with standing. Objective Measures:  tele, IV, catheter    Treatment, including education:    ADL activity training was instructed today. Cues were given for safety, sequence, UE/LE placement, visual cues, and balance. Activities performed today included dressing, toileting, hand hygiene, and grooming. LB dressing- DEP to don socks. Facial hygiene- SBA in high fowlers. Grooming- SET Up for cap to apply chasptick in high fowlers. DEP to clean glasses. Therapeutic activity training was instructed today. Cues were given for safety, sequence, UE/LE placement, awareness, and balance. Activities performed today included bed mobility training, sup-sit, sit-stand, SPT. Supine to EOB- via log roll- Mod x2 mostly for trunk and cues for sequencing. EOB sitting balance- initially- Min A progressed to SBA  Scooting hips forward- CGA with increased effort. Stand to [facility]- 1st trial- Mod x2 with cues. Patient standing balance is fair but becomes fearful after attempting to advance L LE and sits before safe back to EOB needing Max x2 for safe position and descent. 2nd trial- - Min x2 with encouragement.  Patient went directly into SPT to recliner with Min x2 but ultimately needing Max x2 for transition to recliner d/t fear and sitting too soon and not reaching back for chair. Patient demo ankle pumps while supine. As well as BLE resistive ex. Engaged in B hand strengthening utilizing yellow  theraputty  targeting pincer, lateral and key pinch. All therapeutic intervention performed c emphasis on dynamic balance / standing tolerance to increase strength, endurance and activity tolerance for increased [place] c ADL tasks and func transfers / mobility. Patient educated on role of OT , benefits of OT and rationale for therapeutic intervention. Benefit of OOB/EOB activities, benefit of movement. AE/AD, WS/EC,     Patient left safely in bedside chair at end of session, with call light in reach, alarm on and nursing aware. Gait belt was used for func transfers / mobility.          Assessment / Impression:    Patient's tolerance of treatment: well  Adverse Reaction: none  Significant change in status and impact:  none  Barriers to improvement: weakness      Plan for Next Session:    Continue with OT POC      Time in:  927  Time out:  1008  Timed treatment minutes:  41  Total treatment time:  41      Electronically signed by:    KHARI Lemon COTA/L [id number]    9/20/2022, 10:11 AM

## 2022-09-20 NOTE — PROGRESS NOTES
Progress Note( Dr. Jacqueline Byrd)  9/19/2022  Subjective:   Admit Date: 8/28/2022  PCP: Dion Curran MD    Admitted For :Fatigue and possible sepsis from UTI    Consulted For: Control of blood glucose as patient has having hypoglycemic episodes    Interval History: feeling Somewhat  tired  had  no more episode of low blood glucose yesterday  Patient septic with fungemia . in addition to Bbacteremia      Pt had Following Urology procedure done early morng hour spf 9/8/2022  Cystoscopy, left ureteral stent exchange,  suprapubic tube exchange. Patient had RAFFI done and was negative for bacterial endocarditis with a vegetation  Patient was transferred back to medical floor 9/16/2022    Denies any chest pains,   Denies SOB . Denies nausea or vomiting. now eating better  No new bowel or bladder symptoms. Intake/Output Summary (Last 24 hours) at 9/19/2022 2031  Last data filed at 9/19/2022 1656  Gross per 24 hour   Intake 120 ml   Output 1200 ml   Net -1080 ml         DATA    CBC:   Recent Labs     09/19/22  0930   WBC 8.2   HGB 9.8*         CMP:  Recent Labs     09/18/22  0539 09/19/22  0930    143   K 3.7 3.8    103   CO2 30 28   BUN 28* 28*   CREATININE 2.2* 2.3*   CALCIUM 8.4 8.6   PROT 5.9*  --    LABALBU 3.0*  --    BILITOT 0.3  --    ALKPHOS 95  --    AST 23  --    ALT 12  --        Lipids: No results found for: CHOL, HDL, TRIG  Glucose:  Recent Labs     09/19/22  1158 09/19/22  1656 09/19/22 2000   POCGLU 219* 111* 177*       [id number]:  Lab Results   Component Value Date/Time    LABA1C 7.8 08/29/2022 01:30 AM     High Sensitivity TSH:   Lab Results   Component Value Date/Time    TSHHS 7.590 04/28/2022 06:59 AM     Free T3: No results found for: FT3  Free T4:  Lab Results   Component Value Date/Time    T4FREE 1.15 03/27/2022 08:22 AM       CT ABDOMEN PELVIS WO CONTRAST Additional Contrast? None   Final Result   1.  Probable hematuria left urinary collecting system (possible active hemorrhage into the left urinary collecting system) and mild-to-moderate left   hydronephrosis. 2. The double-J stent appears in unremarkable position without left ureter   calculus evident. 3. Trace abdominopelvic ascites is probably reactive. 4.  Vascular calcifications are noted reflecting calcific atherosclerosis. 5. Trace left pleural effusion with left basilar relaxation atelectasis or   infiltrate. 6. Minimal subsegmental atelectasis posterior right lung base. 7. Small hiatal hernia. XR CHEST 1 VIEW   Final Result   1. Right IJ line with the tip overlying the distal SVC/right atrial region. 2. Low lung volumes with bibasilar atelectasis. No significant change. XR CHEST PORTABLE   Final Result   Satisfactory position of the PICC. No acute cardiopulmonary process         VL DUP UPPER EXTREMITY VENOUS RIGHT   Final Result   No evidence of DVT within the right upper extremity. XR CHEST PORTABLE   Final Result   No acute process. XR ABDOMEN (KUB) (SINGLE AP VIEW)   Final Result   Prominent loops of bowel may represent persistent ileus or partial   obstruction. FL LESS THAN 1 HOUR   Final Result      XR CHEST PORTABLE   Final Result   No abnormalities noted. US ABDOMEN LIMITED Specify organ? LIVER, GALLBLADDER   Final Result   Unremarkable right upper quadrant ultrasound. CT ABDOMEN PELVIS WO CONTRAST Additional Contrast? None   Final Result   1. Left ureteral stent in place. Mild bilateral urinary tract dilatation   with new right perinephric and periureteral stranding raises concern for   urinary tract infection. No urinary stones. 2.  Suprapubic catheter in the decompressed urinary bladder. 3.  Ill-defined stranding/fluid throughout the retroperitoneum may relate to   vascular congestion or reactive changes. No intraperitoneal free air or   abscess. XR CHEST PORTABLE   Final Result   Increased interstitial opacity. While much or all of this may be chronic,   please correlate with any clinical evidence of acute interstitial edema. Scheduled Medicines   Medications:    [START ON 9/20/2022] meropenem  2,000 mg IntraVENous Q12H    lidocaine PF  5 mL IntraDERmal Once    sodium chloride flush  5-40 mL IntraVENous 2 times per day    fluconazole  400 mg Oral Daily    insulin glargine  10 Units SubCUTAneous Nightly    chlorhexidine gluconate   Topical Daily    mupirocin   Nasal TID    insulin lispro  0-8 Units SubCUTAneous 2 times per day    insulin lispro  0-8 Units SubCUTAneous TID WC    sodium chloride flush  5-40 mL IntraVENous 2 times per day    meperidine  12.5 mg IntraVENous Once    ferrous sulfate  325 mg Oral BID WC    carvedilol  6.25 mg Oral BID WC    lidocaine  1 patch TransDERmal Daily    amLODIPine  10 mg Oral Daily    aspirin  81 mg Oral Daily    atorvastatin  80 mg Oral Nightly    levothyroxine  75 mcg Oral Daily    melatonin  10 mg Oral Nightly    pantoprazole  40 mg Oral QAM AC    [Held by provider] enoxaparin  40 mg SubCUTAneous QPM      Infusions:    sodium chloride      sodium chloride Stopped (09/19/22 1442)    dextrose           Objective:   Vitals: BP (!) 161/72   Pulse 66   Temp 97.2 °F (36.2 °C) (Oral)   Resp 16   Ht 5' 7.99\" (1.727 m)   Wt 211 lb 6.7 oz (95.9 kg)   SpO2 98%   BMI 32.15 kg/m²   General appearance: alert and cooperative with exam  Neck: no JVD or bruit  Thyroid : Normal lobes   Lungs: Has Vesicular Breath sounds   Heart:  regular rate and rhythm  Abdomen: soft, non-tender; bowel sounds normal; no masses,  no organomegaly has suprapubic catheter   Musculoskeletal: Normal  Extremities: extremities normal, , no edema  Neurologic:  Awake, alert, oriented to name, place and time. Cranial nerves II-XII are grossly intact. Motor weakness. Sensory neuropathy. ,  and gait is abnormal.  Stable    Assessment:     Patient Active Problem List:     Gait disturbance     Generalized

## 2022-09-20 NOTE — PROGRESS NOTES
Called Dr Hoff Corporal and informed Eye drop therapy is complete per orders, Dr Luis Eduardo Gimenez states he will see pat at end of clinic day today to eval eyes.

## 2022-09-20 NOTE — PROGRESS NOTES
Progress Note( Dr. Ezequiel Baron)  9/20/2022  Subjective:   Admit Date: 8/28/2022  PCP: Noemi Garcia MD    Admitted For :Fatigue and possible sepsis from UTI    Consulted For: Control of blood glucose as patient has having hypoglycemic episodes    Interval History: feeling Somewhat  tired  had  no more episode of low blood glucose yesterday  Patient septic with fungemia . in addition to Bbacteremia      Pt had Following Urology procedure done early morng hour spf 9/8/2022  Cystoscopy, left ureteral stent exchange,  suprapubic tube exchange. Patient had RAFFI done and was negative for bacterial endocarditis with a vegetation  Patient was transferred back to medical floor 9/16/2022    Patient received Lantus last night which to which she not supposed to unless nurses are to notify me which she she did not    Denies any chest pains,   Denies SOB . Denies nausea or vomiting. now eating better  No new bowel or bladder symptoms.        Intake/Output Summary (Last 24 hours) at 9/20/2022 0857  Last data filed at 9/20/2022 0558  Gross per 24 hour   Intake 240 ml   Output 1335 ml   Net -1095 ml         DATA    CBC:   Recent Labs     09/19/22  0930   WBC 8.2   HGB 9.8*         CMP:  Recent Labs     09/18/22  0539 09/19/22  0930    143   K 3.7 3.8    103   CO2 30 28   BUN 28* 28*   CREATININE 2.2* 2.3*   CALCIUM 8.4 8.6   PROT 5.9*  --    LABALBU 3.0*  --    BILITOT 0.3  --    ALKPHOS 95  --    AST 23  --    ALT 12  --        Lipids: No results found for: CHOL, HDL, TRIG  Glucose:  Recent Labs     09/19/22 2000 09/20/22  0408 09/20/22  0758   POCGLU 177* 109* 124*       [id number]:  Lab Results   Component Value Date/Time    LABA1C 7.8 08/29/2022 01:30 AM     High Sensitivity TSH:   Lab Results   Component Value Date/Time    TSHHS 7.590 04/28/2022 06:59 AM     Free T3: No results found for: FT3  Free T4:  Lab Results   Component Value Date/Time    T4FREE 1.15 03/27/2022 08:22 AM       CT ABDOMEN PELVIS WO CONTRAST Additional Contrast? None   Final Result   1. Probable hematuria left urinary collecting system (possible active   hemorrhage into the left urinary collecting system) and mild-to-moderate left   hydronephrosis. 2. The double-J stent appears in unremarkable position without left ureter   calculus evident. 3. Trace abdominopelvic ascites is probably reactive. 4.  Vascular calcifications are noted reflecting calcific atherosclerosis. 5. Trace left pleural effusion with left basilar relaxation atelectasis or   infiltrate. 6. Minimal subsegmental atelectasis posterior right lung base. 7. Small hiatal hernia. XR CHEST 1 VIEW   Final Result   1. Right IJ line with the tip overlying the distal SVC/right atrial region. 2. Low lung volumes with bibasilar atelectasis. No significant change. XR CHEST PORTABLE   Final Result   Satisfactory position of the PICC. No acute cardiopulmonary process         VL DUP UPPER EXTREMITY VENOUS RIGHT   Final Result   No evidence of DVT within the right upper extremity. XR CHEST PORTABLE   Final Result   No acute process. XR ABDOMEN (KUB) (SINGLE AP VIEW)   Final Result   Prominent loops of bowel may represent persistent ileus or partial   obstruction. FL LESS THAN 1 HOUR   Final Result      XR CHEST PORTABLE   Final Result   No abnormalities noted. US ABDOMEN LIMITED Specify organ? LIVER, GALLBLADDER   Final Result   Unremarkable right upper quadrant ultrasound. CT ABDOMEN PELVIS WO CONTRAST Additional Contrast? None   Final Result   1. Left ureteral stent in place. Mild bilateral urinary tract dilatation   with new right perinephric and periureteral stranding raises concern for   urinary tract infection. No urinary stones. 2.  Suprapubic catheter in the decompressed urinary bladder.       3.  Ill-defined stranding/fluid throughout the retroperitoneum may relate to   vascular congestion or reactive changes. No intraperitoneal free air or   abscess. XR CHEST PORTABLE   Final Result   Increased interstitial opacity. While much or all of this may be chronic,   please correlate with any clinical evidence of acute interstitial edema. Scheduled Medicines   Medications:    meropenem  2,000 mg IntraVENous Q12H    lidocaine PF  5 mL IntraDERmal Once    sodium chloride flush  5-40 mL IntraVENous 2 times per day    fluconazole  400 mg Oral Daily    insulin glargine  10 Units SubCUTAneous Nightly    chlorhexidine gluconate   Topical Daily    insulin lispro  0-8 Units SubCUTAneous 2 times per day    insulin lispro  0-8 Units SubCUTAneous TID WC    sodium chloride flush  5-40 mL IntraVENous 2 times per day    meperidine  12.5 mg IntraVENous Once    ferrous sulfate  325 mg Oral BID WC    carvedilol  6.25 mg Oral BID WC    lidocaine  1 patch TransDERmal Daily    amLODIPine  10 mg Oral Daily    aspirin  81 mg Oral Daily    atorvastatin  80 mg Oral Nightly    levothyroxine  75 mcg Oral Daily    melatonin  10 mg Oral Nightly    pantoprazole  40 mg Oral QAM AC    [Held by provider] enoxaparin  40 mg SubCUTAneous QPM      Infusions:    sodium chloride      sodium chloride Stopped (09/19/22 1442)    dextrose           Objective:   Vitals: BP (!) 163/62   Pulse 74   Temp 98.3 °F (36.8 °C) (Oral)   Resp 18   Ht 5' 7.99\" (1.727 m)   Wt 206 lb 9.1 oz (93.7 kg)   SpO2 97%   BMI 31.42 kg/m²   General appearance: alert and cooperative with exam  Neck: no JVD or bruit  Thyroid : Normal lobes   Lungs: Has Vesicular Breath sounds   Heart:  regular rate and rhythm  Abdomen: soft, non-tender; bowel sounds normal; no masses,  no organomegaly has suprapubic catheter   Musculoskeletal: Normal  Extremities: extremities normal, , no edema  Neurologic:  Awake, alert, oriented to name, place and time. Cranial nerves II-XII are grossly intact. Motor weakness. Sensory neuropathy. ,  and gait is abnormal. Stable    Assessment:     Patient Active Problem List:     Gait disturbance     Generalized weakness     Diabetes mellitus (HCC)     Osteoarthritis     Anemia of chronic disorder     Infected implanted bladder sphincter cuff     Escherichia coli urinary tract infection     Hypertension     Sepsis ([address]     Acute encephalopathy     Type 2 diabetes mellitus with hypoglycemia without coma (HCC)     UTI (urinary tract infection) due to urinary indwelling catheter (HCC)     Hyperkalemia     Acute kidney failure (HCC)     Leg weakness, bilateral     Type 2 diabetes mellitus with neurological manifestations, uncontrolled ([address]     Complicated UTI (urinary tract infection)     Essential hypertension     Severe muscle deconditioning     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 (HCC)     Frequent falls     Chronic kidney disease, stage 3a ([address]     Uncontrolled type 2 diabetes mellitus with peripheral neuropathy (HCC)     Prostate cancer (HCC)     Suprapubic catheter ([address]     Sepsis due to urinary tract infection ([address]     Coagulase negative Staphylococcus bacteremia     Chronic kidney disease, stage IV (severe) (HCC)     Acute metabolic encephalopathy     SVT (supraventricular tachycardia) (HCC)     Metabolic encephalopathy     History of prostate cancer     Cigarette nicotine dependence without complication     Altered mental status     Serratia marcescens infection     Hypoglycemia         Plan:     Reviewed POC blood glucose . Labs and X ray results   Reviewed Current Medicines   On Correction bolus Humalog/ Basal Lantus Insulin regime /   Monitor Blood glucose frequently   Modified  the dose of Insulin/ other medicines as neede  Management is working to show the patient to skilled nursing unit possibly [facility] home  Will follow     .      Leti Chacon MD, MD

## 2022-09-20 NOTE — PROGRESS NOTES
V2.0  [facility] Hospitalist Progress Note      Name:  Des Enriquez /Age/Sex: 1940  (78 y.o. male)   MRN & CSN:  [id number] & [id number] Encounter Date/Time: 2022 6:54 PM EDT    Location:  27/9918-R PCP: Sabina Bradshaw MD       Hospital Day: 24    Assessment and Plan:   Des Enriquez is a 78 y.o. male with pmh of prostate cancer, diabetes mellitus, hypertension who admitted with sciatic found out with Complicated UTI (urinary tract infection)    #Candida albicans fungemia  #Candida UTI  -Transthoracic echo inconclusive  - Spoke to ophthalmology, recommended checking visual acuity, per my evaluation, patient's vision is 20/20 bilaterally (with glasses on).  No documentation available in chart about formal ophthalmology consult  -No growth on catheter tip culture   -Repeat Bcx with no growth  -RAFFI negative for vegetations  -Negative repeat blood cultures and central line tip culture  -Positive MRSA screen  -Eraxis -, changed by ID due to insurance coverage issues    Plan:  ID following, appreciate recs  Switched to fluconazole with EOT , followed by Diflucan 400 mg daily with end date   Awaiting ophthalmology evaluation for endogenous fungal endophthalmitis     #ESBL bacteremia in the setting of UTI  #Complicated UTI  - Urine culture on  growing E. coli resistant to Cipro and cefepime  - Urine culture on  no significant growth  - CT abdomen/pelvis showing left ureteral stent in place, mild bilateral urinary tract dilatation with new right perinephric and periureteral stranding raises concern for UTI  -Was on cefiderocol     Plan:  Switched to meropenem with EOT   ID following, appreciated recs    #MRSA colonization  -Patient to continue Bactroban ointment to nares for 5 days and Hibiclens baths daily for 10 days     #Chronic urinary retention managed with suprapubic catheter and exchanged monthly, last exchange  with a cystoscopy patient had hematuria and patient became lethargic and drowsy with elevated leukocytes discussed with ID started on vancomycin and fluconazole and 9/9 obtaining blood cultures and urine cultures urine and blood cultures came back positive for Candida albicans treated as above    # Hematuria s/p cystoscopy and exchange of stent  -Urology now following peripherally, recommended to manually irrigate bladder every 4 hours and as needed clots     #Patient with RUE swelling-Improving  -ultrasound RUE negative for DVT Likely due to volume overload in the setting of PAMELLA    Chronic medical problems:      # Normocytic anemia   - Continue with H&H  - Transfuse if hemoglobin less than 7     # History of prostate cancer s/p prostatectomy 1996    #Hypertension  Plan: continue home meds     # Hypothyroidism   Plan: continue levothyroxine     # Type 2 diabetes  Continue SSI, hypoglycemic protocol diabetic diet      Diet ADULT DIET; Regular; 4 carb choices (60 gm/meal)  ADULT ORAL NUTRITION SUPPLEMENT; Breakfast, Dinner; Diabetic Oral Supplement   DVT Prophylaxis [x] Lovenox, []  Heparin, [] SCDs, [] Ambulation,  [] Eliquis, [] Xarelto  [] Coumadin   Code Status Full Code   Disposition From: Home  Expected Disposition:  SNF  Estimated Date of Discharge: Greater than 2 days  Patient requires continued admission due to candidemia, evaluation for endogenous fungal endophthalmitis by ophthalmology   Surrogate Decision Maker/ [facility]. Subjective:     Chief Complaint: Fatigue (General weakness started this am. Family called because pt had changed from baseline. Currently being tx for UTI. Pt is slightly confused, doesn't know the year. He has been N/V. )     Patient feels well today.   No complaints    Review of Systems:    General: No fever, no chills  Eyes: No blurry vision  Heart: No chest pain, no palpitations  Lungs: No shortness of breath, no cough  Abdomen: No abdominal pain, no nausea, no vomiting, no constipation, no diarrhea  : No frequency, no dysuria, no urgency, no decreased urination  MSK: No lower extremity swelling  Neuro: No confusion, no weakness  Skin: No rashes    Objective:      Intake/Output Summary (Last 24 hours) at 9/20/2022 1212  Last data filed at 9/20/2022 0904  Gross per 24 hour   Intake 360 ml   Output 1335 ml   Net -975 ml          Vitals:   Vitals:    09/20/22 0812   BP: (!) 163/62   Pulse: 74   Resp: 18   Temp: 98.3 °F (36.8 °C)   SpO2: 97%       Physical Exam:     General: NAD, AAO x3-4  Eyes: EOMI  HENT: Atraumatic  Resp: no respiratory distress  GI: Normal bowel sounds, soft, nondistended, nontender  MSK: Normal ROM, no lower extremity edema, bilateral hand swelling  Skin: Intact, dry, warm, no rashes  Neuro: CN II to XII grossly intact  Psych: Normal mood    Medications:   Medications:    meropenem  2,000 mg IntraVENous Q12H    lidocaine PF  5 mL IntraDERmal Once    sodium chloride flush  5-40 mL IntraVENous 2 times per day    fluconazole  400 mg Oral Daily    insulin glargine  10 Units SubCUTAneous Nightly    chlorhexidine gluconate   Topical Daily    insulin lispro  0-8 Units SubCUTAneous 2 times per day    insulin lispro  0-8 Units SubCUTAneous TID WC    sodium chloride flush  5-40 mL IntraVENous 2 times per day    meperidine  12.5 mg IntraVENous Once    ferrous sulfate  325 mg Oral BID WC    carvedilol  6.25 mg Oral BID WC    lidocaine  1 patch TransDERmal Daily    amLODIPine  10 mg Oral Daily    aspirin  81 mg Oral Daily    atorvastatin  80 mg Oral Nightly    levothyroxine  75 mcg Oral Daily    melatonin  10 mg Oral Nightly    pantoprazole  40 mg Oral QAM AC    [Held by provider] enoxaparin  40 mg SubCUTAneous QPM      Infusions:    sodium chloride      sodium chloride Stopped (09/19/22 1442)    dextrose       PRN Meds: sodium chloride flush, 5-40 mL, PRN  sodium chloride, , PRN  bisacodyl, 10 mg, Daily PRN  docusate sodium, 100 mg, BID PRN  polyethylene glycol, 17 g, Daily PRN  sodium chloride flush, 5-40 mL, PRN  sodium chloride, , PRN  sodium chloride flush, 5-40 mL, PRN  HYDROmorphone, 0.25 mg, Q5 Min PRN  fentanNYL, 50 mcg, Q5 Min PRN  guaiFENesin-dextromethorphan, 5 mL, Q4H PRN  magnesium hydroxide, 30 mL, Daily PRN  sodium chloride flush, 5-40 mL, PRN  ondansetron, 4 mg, Q8H PRN   Or  ondansetron, 4 mg, Q6H PRN  aluminum & magnesium hydroxide-simethicone, 30 mL, Q6H PRN  acetaminophen, 650 mg, Q6H PRN   Or  acetaminophen, 650 mg, Q6H PRN  glucose, 4 tablet, PRN  dextrose bolus, 125 mL, PRN   Or  dextrose bolus, 250 mL, PRN  glucagon (rDNA), 1 mg, PRN  dextrose, , Continuous PRN  ipratropium-albuterol, 1 vial, BID PRN      Labs      Recent Results (from the past 24 hour(s))   POCT Glucose    Collection Time: 09/19/22  4:56 PM   Result Value Ref Range    POC Glucose 111 (H) 70 - 99 MG/DL   POCT Glucose    Collection Time: 09/19/22  8:00 PM   Result Value Ref Range    POC Glucose 177 (H) 70 - 99 MG/DL   POCT Glucose    Collection Time: 09/20/22  4:08 AM   Result Value Ref Range    POC Glucose 109 (H) 70 - 99 MG/DL   POCT Glucose    Collection Time: 09/20/22  7:58 AM   Result Value Ref Range    POC Glucose 124 (H) 70 - 99 MG/DL        Imaging/Diagnostics Last 24 Hours   Echocardiogram complete 2D with doppler with color    Result Date: 9/12/2022  Transthoracic Echocardiography Report (TTE)  Demographics   Patient Name      Chris MARCIAL     Date of Study       09/12/2022   Date of Birth     1940         Gender              Male   Age               78 year(s)         Race                Black   Patient Number    [id number]         Room Number         2006   Visit Number      [id number]   Corporate ID      [id number]   Accession Number  [id number]         Sonographer         JOVANI Carroll   Ordering          [facility]  Physician         NORA MICHELLE-JOHNNY         Physician           MD Procedure Type of Study   TTE procedure:ECHOCARDIOGRAM COMPLETE 2D W DOPPLER W COLOR. Procedure Date Date: 09/12/2022 Start: 02:23 PM Study Location: Portable Technical Quality: Adequate visualization Indications:Endocarditis. Patient Status: Routine Height: 68 inches Weight: 210 pounds BSA: 2.09 m2 BMI: 31.93 kg/m2 HR: 68 bpm BP: 141/57 mmHg  Conclusions   Summary  Left ventricular systolic function is normal.  Ejection fraction is visually estimated at 60%. Grade II diastolic dysfunction. Cannot rule out endocarditis on the tricuspid valve. No evidence of any pericardial effusion. Signature   ------------------------------------------------------------------  Electronically signed by Nguyen Martinez MD (Interpreting  physician) on 09/12/2022 at 06:43 PM  ------------------------------------------------------------------   Findings   Left Ventricle  Left ventricular systolic function is normal.  Ejection fraction is visually estimated at 60%. No regional wall motion abnormalites. Grade II diastolic dysfunction. Left ventricle size is normal.   Left Atrium  Essentially normal left atrium. Right Atrium  Essentially normal right atrium. Right Ventricle  Essentially normal right ventricle. Aortic Valve  Sclerotic, but non-stenotic aortic valve. No significant valvular disease noted. Mitral Valve  Structurally normal mitral valve. Mild mitral regurgitation. Tricuspid Valve  Tricuspid valve is structurally normal.  Mild tricuspid regurgitation. Cannot rule out endocarditis on the tricuspid valve. Pulmonic Valve  Pulmonic valve is structurally normal.  No significant valvular disease noted. Pericardial Effusion  No evidence of any pericardial effusion. Pleural Effusion  No evidence of pleural effusion. Miscellaneous  IVC and abdominal aorta are within normal limits.   M-Mode/2D Measurements & Calculations   LV Diastolic Dimension:  LV Systolic Dimension:  LA Dimension: 3.7 cmAO Root  3.68 cm                  2.44 cm                 Dimension: 3.3 cmLA Area:  LV FS:33.7 %             LV Volume Diastolic: 62 53.6 cm2  LV PW Diastolic: 1.3 cm  ml  LV PW Systolic: 0.99 cm  LV Volume Systolic: 26  Septum Diastolic: 0.42   ml  cm                       LV EDV/LV EDV Index: 62  Septum Systolic: 3.11 cm YY/13 A2CY ESV/LV ESV   LA/Aorta: 1.12  CO: 4.47 l/min           Index: 26 ml/12 m2      Ascending Aorta: 3.4 cm  CI: 2.14 l/m*m2          EF Calculated (A4C):    LA volume/Index: 27 ml                           58.1 %                  /25I5  LV Area Diastolic: 24    EF Calculated (2D):  cm2                      63.4 %  LV Area Systolic: 28.0  cm2                      LV Length: 7.77 cm                            LVOT: 2.1 cm  Doppler Measurements & Calculations   MV Peak E-Wave: 110     AV Peak Velocity: 150 cm/s  LVOT Peak Velocity: 88  cm/s                    AV Peak Gradient: 9 mmHg    cm/s  MV Peak A-Wave: 89.3    AV Mean Velocity: 107 cm/s  LVOT Mean Velocity: 65  cm/s                    AV Mean Gradient: 5 mmHg    cm/s  MV E/A Ratio: 1.23      AV VTI: 31.9 cm             LVOT Peak Gradient: 3  MV Peak Gradient: 4.84  AV Area (Continuity):2.06   mmHgLVOT Mean Gradient:  mmHg                    cm2                         2 mmHg  MV Mean Gradient: 4                                 Estimated RVSP: 35 mmHg  mmHg                    LVOT VTI: 19 cm             Estimated RAP:3 mmHg  MV Mean Velocity: 90.2  cm/s                    Estimated PASP: 35.49 mmHg  MV Deceleration Time:                               TR Velocity:285 cm/s  203 msec                                            TR Gradient:32.49 mmHg   MV Area (continuity):  1.67 cm2  MV E' Septal Velocity:  8.33 cm/s  MV A' Septal Velocity:  7.13 cm/s  MV E' Lateral Velocity:  8.11 cm/s  MV A' Lateral Velocity:  9.32 cm/s  MV E/E' septal: 13.21  MV E/E' lateral: 13.56        Electronically signed by Blake Francisco MD on 9/20/2022 at 12:12 PM

## 2022-09-21 VITALS
HEIGHT: 68 IN | TEMPERATURE: 98.2 F | DIASTOLIC BLOOD PRESSURE: 74 MMHG | WEIGHT: 206.57 LBS | HEART RATE: 69 BPM | BODY MASS INDEX: 31.31 KG/M2 | RESPIRATION RATE: 16 BRPM | SYSTOLIC BLOOD PRESSURE: 143 MMHG | OXYGEN SATURATION: 98 %

## 2022-09-21 LAB
GLUCOSE BLD-MCNC: 131 MG/DL (ref 70–99)
GLUCOSE BLD-MCNC: 133 MG/DL (ref 70–99)
GLUCOSE BLD-MCNC: 171 MG/DL (ref 70–99)
GLUCOSE BLD-MCNC: 248 MG/DL (ref 70–99)
SARS-COV-2, NAAT: NOT DETECTED

## 2022-09-21 PROCEDURE — 6360000002 HC RX W HCPCS: Performed by: NURSE PRACTITIONER

## 2022-09-21 PROCEDURE — 6370000000 HC RX 637 (ALT 250 FOR IP): Performed by: NURSE PRACTITIONER

## 2022-09-21 PROCEDURE — 6370000000 HC RX 637 (ALT 250 FOR IP): Performed by: INTERNAL MEDICINE

## 2022-09-21 PROCEDURE — 82962 GLUCOSE BLOOD TEST: CPT

## 2022-09-21 PROCEDURE — 6370000000 HC RX 637 (ALT 250 FOR IP): Performed by: SPECIALIST

## 2022-09-21 PROCEDURE — 99232 SBSQ HOSP IP/OBS MODERATE 35: CPT | Performed by: NURSE PRACTITIONER

## 2022-09-21 PROCEDURE — 2580000003 HC RX 258: Performed by: STUDENT IN AN ORGANIZED HEALTH CARE EDUCATION/TRAINING PROGRAM

## 2022-09-21 PROCEDURE — 6360000002 HC RX W HCPCS: Performed by: SPECIALIST

## 2022-09-21 PROCEDURE — 2580000003 HC RX 258: Performed by: NURSE PRACTITIONER

## 2022-09-21 PROCEDURE — 94761 N-INVAS EAR/PLS OXIMETRY MLT: CPT

## 2022-09-21 PROCEDURE — 94150 VITAL CAPACITY TEST: CPT

## 2022-09-21 PROCEDURE — 87635 SARS-COV-2 COVID-19 AMP PRB: CPT

## 2022-09-21 RX ORDER — MEROPENEM AND SODIUM CHLORIDE 500 MG/50ML
2000 INJECTION, SOLUTION INTRAVENOUS EVERY 12 HOURS
Qty: 600 ML | Refills: 0 | OUTPATIENT
Start: 2022-09-21 | End: 2022-09-24

## 2022-09-21 RX ORDER — FLUCONAZOLE 200 MG/1
400 TABLET ORAL DAILY
Qty: 10 TABLET | Refills: 0 | Status: SHIPPED | OUTPATIENT
Start: 2022-09-22 | End: 2022-09-27

## 2022-09-21 RX ORDER — FERROUS SULFATE 325(65) MG
325 TABLET ORAL 2 TIMES DAILY WITH MEALS
Qty: 30 TABLET | Refills: 3 | Status: ON HOLD | OUTPATIENT
Start: 2022-09-21

## 2022-09-21 RX ORDER — MEROPENEM AND SODIUM CHLORIDE 500 MG/50ML
2000 INJECTION, SOLUTION INTRAVENOUS EVERY 12 HOURS
Qty: 1600 ML | Refills: 0 | Status: SHIPPED | OUTPATIENT
Start: 2022-09-21 | End: 2022-09-21 | Stop reason: HOSPADM

## 2022-09-21 RX ORDER — MAGNESIUM HYDROXIDE/ALUMINUM HYDROXICE/SIMETHICONE 120; 1200; 1200 MG/30ML; MG/30ML; MG/30ML
30 SUSPENSION ORAL EVERY 6 HOURS PRN
Qty: 300 ML | Refills: 3 | Status: ON HOLD | OUTPATIENT
Start: 2022-09-21 | End: 2022-10-21

## 2022-09-21 RX ORDER — ENOXAPARIN SODIUM 100 MG/ML
30 INJECTION SUBCUTANEOUS EVERY EVENING
Status: DISCONTINUED | OUTPATIENT
Start: 2022-09-21 | End: 2022-09-21 | Stop reason: HOSPADM

## 2022-09-21 RX ORDER — MEROPENEM AND SODIUM CHLORIDE 500 MG/50ML
2000 INJECTION, SOLUTION INTRAVENOUS EVERY 12 HOURS
Qty: 600 ML | Refills: 0 | Status: SHIPPED | OUTPATIENT
Start: 2022-09-21 | End: 2022-09-24

## 2022-09-21 RX ORDER — CARVEDILOL 6.25 MG/1
6.25 TABLET ORAL 2 TIMES DAILY WITH MEALS
Qty: 60 TABLET | Refills: 3 | Status: SHIPPED | OUTPATIENT
Start: 2022-09-21 | End: 2022-10-24 | Stop reason: DRUGHIGH

## 2022-09-21 RX ADMIN — LEVOTHYROXINE SODIUM 75 MCG: 75 TABLET ORAL at 05:28

## 2022-09-21 RX ADMIN — INSULIN LISPRO 2 UNITS: 100 INJECTION, SOLUTION INTRAVENOUS; SUBCUTANEOUS at 16:54

## 2022-09-21 RX ADMIN — FERROUS SULFATE TAB 325 MG (65 MG ELEMENTAL FE) 325 MG: 325 (65 FE) TAB at 08:34

## 2022-09-21 RX ADMIN — CARVEDILOL 6.25 MG: 6.25 TABLET, FILM COATED ORAL at 08:34

## 2022-09-21 RX ADMIN — MEROPENEM 2000 MG: 1 INJECTION, POWDER, FOR SOLUTION INTRAVENOUS at 00:12

## 2022-09-21 RX ADMIN — SODIUM CHLORIDE, PRESERVATIVE FREE 10 ML: 5 INJECTION INTRAVENOUS at 09:20

## 2022-09-21 RX ADMIN — MEROPENEM 2000 MG: 1 INJECTION, POWDER, FOR SOLUTION INTRAVENOUS at 11:47

## 2022-09-21 RX ADMIN — FLUCONAZOLE 400 MG: 100 TABLET ORAL at 08:34

## 2022-09-21 RX ADMIN — FERROUS SULFATE TAB 325 MG (65 MG ELEMENTAL FE) 325 MG: 325 (65 FE) TAB at 16:52

## 2022-09-21 RX ADMIN — ASPIRIN 81 MG CHEWABLE TABLET 81 MG: 81 TABLET CHEWABLE at 08:34

## 2022-09-21 RX ADMIN — AMLODIPINE BESYLATE 10 MG: 10 TABLET ORAL at 08:34

## 2022-09-21 RX ADMIN — ENOXAPARIN SODIUM 30 MG: 100 INJECTION SUBCUTANEOUS at 16:52

## 2022-09-21 RX ADMIN — CHLORHEXIDINE GLUCONATE 120 ML: 4 LIQUID TOPICAL at 08:40

## 2022-09-21 RX ADMIN — PANTOPRAZOLE SODIUM 40 MG: 40 TABLET, DELAYED RELEASE ORAL at 05:28

## 2022-09-21 RX ADMIN — SODIUM CHLORIDE, PRESERVATIVE FREE 10 ML: 5 INJECTION INTRAVENOUS at 09:21

## 2022-09-21 RX ADMIN — CARVEDILOL 6.25 MG: 6.25 TABLET, FILM COATED ORAL at 16:52

## 2022-09-21 RX ADMIN — SODIUM CHLORIDE 25 ML: 9 INJECTION, SOLUTION INTRAVENOUS at 00:11

## 2022-09-21 NOTE — PROGRESS NOTES
Nephrology Progress Note  9/21/2022 6:55 AM        Subjective:   Admit Date: 8/28/2022  PCP: Ever Lopez MD    Interval History: Still waiting for placement, no event overnight    Diet: Reasonable    ROS: No shortness of breath  Urine output recorded only 700 cc for the last 24 hours  No fever and acceptable blood pressure      Data:     Current meds:    meropenem  2,000 mg IntraVENous Q12H    lidocaine PF  5 mL IntraDERmal Once    sodium chloride flush  5-40 mL IntraVENous 2 times per day    fluconazole  400 mg Oral Daily    insulin glargine  10 Units SubCUTAneous Nightly    chlorhexidine gluconate   Topical Daily    insulin lispro  0-8 Units SubCUTAneous 2 times per day    insulin lispro  0-8 Units SubCUTAneous TID WC    sodium chloride flush  5-40 mL IntraVENous 2 times per day    meperidine  12.5 mg IntraVENous Once    ferrous sulfate  325 mg Oral BID WC    carvedilol  6.25 mg Oral BID WC    lidocaine  1 patch TransDERmal Daily    amLODIPine  10 mg Oral Daily    aspirin  81 mg Oral Daily    atorvastatin  80 mg Oral Nightly    levothyroxine  75 mcg Oral Daily    melatonin  10 mg Oral Nightly    pantoprazole  40 mg Oral QAM AC    [Held by provider] enoxaparin  40 mg SubCUTAneous QPM      sodium chloride      sodium chloride 25 mL (09/21/22 0011)    dextrose           I/O last 3 completed shifts:   In: 480 [P.O.:480]  Out: 1885 [Urine:1885]    CBC:   Recent Labs     09/19/22  0930   WBC 8.2   HGB 9.8*             Recent Labs     09/19/22  0930      K 3.8      CO2 28   BUN 28*   CREATININE 2.3*   GLUCOSE 173*       Lab Results   Component Value Date    CALCIUM 8.6 09/19/2022    PHOS 3.0 09/19/2022       Objective:     Vitals: BP (!) 153/82   Pulse 69   Temp 98.2 °F (36.8 °C) (Oral)   Resp 18   Ht 5' 7.99\" (1.727 m)   Wt 206 lb 9.1 oz (93.7 kg)   SpO2 98%   BMI 31.42 kg/m² ,    General appearance: Alert, awake and oriented  HEENT: Positive conjunctival pallor  Neck: Supple  Lungs: No crackles  Heart: Regular rate and rhythm  Abdomen: Soft, suprapubic catheter  Extremities: No gross edema      Problem List :         Impression :     Acute kidney injury on top of CKD stage III-if he stays tomorrow given abnormal lab-urine output likely not accurately recorded  Recent sepsis-ureteral stent infection which has been exchanged-he remains in risk for further infection  He does have prostate cancer, diabetes  Multifactorial anemia    Recommendation/Plan  :     He is waiting for discharge  You need CMP, CBC weekly x5  Follow-up with me  If he stays here we will get some lab tomorrow  Follow clinically      José Rios MD MD

## 2022-09-21 NOTE — PROGRESS NOTES
[id number] [facility] to give report, Aftab Dubon transferred me to Nursing Supervisor and no one answered and line disconnected. Will attempt to call again.

## 2022-09-21 NOTE — DISCHARGE SUMMARY
V2.0  Discharge Summary    Name:  Janina Ruiz /Age/Sex: 1940 (73 y.o. male)   Admit Date: 2022  Discharge Date: 22    MRN & CSN:  [id number] & [id number] Encounter Date and Time 22 2:30 PM EDT    Attending:  Mickey Hannah MD Discharging Provider: Mickey Hannah MD       Hospital Course:     Brief HPI: Janina Ruiz is a 78 y.o. male who presented with Complicated UTI (urinary tract infection). Patient was diagnosed with septic shock 2/2 pyelonephritis. Workup showed candidemia and ESBL bacteremia. Patient underwent workup for IE which was negative. Clinically improved and was transitioned to appropriate Abx as per ID recs. Ophthalmology consulted to r/o fungal endophthamitis, which was ruled out after patient underwent dilation and exam by ophthalmology (Dr Janina Liu). Patient was optimized for d/c to rehab. Brief Problem Based Course:   #Candida albicans fungemia  #Candida UTI  -Transthoracic echo inconclusive  - Spoke to ophthalmology, recommended checking visual acuity, per my evaluation, patient's vision is 20/20 bilaterally (with glasses on). No documentation available in chart about formal ophthalmology consult  -No growth on catheter tip culture   -Repeat Bcx with no growth  -RAFFI negative for vegetations  -Negative repeat blood cultures and central line tip culture  -Positive MRSA screen  -Eraxis -, changed by ID due to insurance coverage issues     Plan:  ID following, appreciate recs  Switched to fluconazole with EOT , followed by Diflucan 400 mg daily with end date   Patient was evaluated by ophthalmology with a dilated retinal exam, no evidence of endogenous fungal endophthalmitis found on exam as per ophthalmologist Dr. Clifton Funes.      #ESBL bacteremia in the setting of UTI  #Complicated UTI  - Urine culture on  growing E. coli resistant to Cipro and cefepime  - Urine culture on  no significant growth  - CT abdomen/pelvis showing left ureteral stent in place, mild bilateral urinary tract dilatation with new right perinephric and periureteral stranding raises concern for UTI  -Was on cefiderocol      Plan:  Switched to meropenem with EOT 9/24  ID following, appreciated recs     #MRSA colonization  -Patient to continue Bactroban ointment to nares for 5 days and Hibiclens baths daily for 10 days     #Chronic urinary retention managed with suprapubic catheter and exchanged monthly, last exchange 9/7 with a cystoscopy patient had hematuria and patient became lethargic and drowsy with elevated leukocytes discussed with ID started on vancomycin and fluconazole and 9/9 obtaining blood cultures and urine cultures urine and blood cultures came back positive for Candida albicans treated as above     # Hematuria s/p cystoscopy and exchange of stent  -Urology now following peripherally, recommended to manually irrigate bladder every 4 hours and as needed clots     #Patient with RUE swelling-Improving  -ultrasound RUE negative for DVT Likely due to volume overload in the setting of PAMELLA     Chronic medical problems:      # Normocytic anemia   - Continue with H&H  - Transfuse if hemoglobin less than 7     # History of prostate cancer s/p prostatectomy 1996     #Hypertension  Plan: continue home meds      # Hypothyroidism   Plan: continue levothyroxine      # Type 2 diabetes  Continue SSI, hypoglycemic protocol diabetic diet      The patient expressed appropriate understanding of, and agreement with the discharge recommendations, medications, and plan.      Consults this admission:  IP CONSULT TO IV TEAM  IP CONSULT TO HOSPITALIST  IP CONSULT TO INFECTIOUS DISEASES  IP CONSULT TO IV TEAM  IP CONSULT TO ENDOCRINOLOGY  IP CONSULT TO UROLOGY  IP CONSULT TO IV TEAM  IP CONSULT TO IV TEAM  IP CONSULT TO NEPHROLOGY  IP CONSULT TO PHARMACY  IP CONSULT TO IV TEAM  IP CONSULT TO IV TEAM  IP CONSULT TO CARDIOLOGY  IP CONSULT TO IV TEAM  IP CONSULT TO OPHTHALMOLOGY    Discharge Diagnosis: Complicated UTI (urinary tract infection)    Candida albicans fungemia  Candida UTI  ESBL bacteremia  MRSA colonization  Chronic urinary retention managed with suprapubic catheter and exchanged monthly  Discharge Instruction:   Follow up appointments: ID, Nephrology   Primary care physician: Maite Murray MD within 2 weeks  Diet: regular diet and encourage fluids   Activity: activity as tolerated  Disposition: Discharged to:   []Home, [][facility], []SNF, [x]Acute Rehab, []Hospice   Condition on discharge: Stable  Labs and Tests to be Followed up as an outpatient by PCP or Specialist: ID, nephrology    Discharge Medications:        Medication List        START taking these medications      aluminum & magnesium hydroxide-simethicone 200-200-20 MG/5ML Susp suspension  Commonly known as: MAALOX  Take 30 mLs by mouth every 6 hours as needed for Indigestion     ferrous sulfate 325 (65 Fe) MG tablet  Commonly known as: IRON 325  Take 1 tablet by mouth 2 times daily (with meals)     fluconazole 200 MG tablet  Commonly known as: DIFLUCAN  Take 2 tablets by mouth daily for 5 days  Start taking on: September 22, 2022     meropenem 500 MG/50ML Solr IVPB (duplex)  Commonly known as: MERREM  Infuse 200 mLs intravenously in the morning and 200 mLs in the evening. Do all this for 3 days.             CHANGE how you take these medications      carvedilol 6.25 MG tablet  Commonly known as: COREG  Take 1 tablet by mouth 2 times daily (with meals)  What changed:   medication strength  how much to take            CONTINUE taking these medications      acetaminophen 325 MG tablet  Commonly known as: Tylenol  Take 2 tablets by mouth every 6 hours as needed for Pain     allopurinol 100 MG tablet  Commonly known as: ZYLOPRIM     amLODIPine 10 MG tablet  Commonly known as: NORVASC     aspirin 81 MG chewable tablet  Take 1 tablet by mouth daily     atorvastatin 80 MG tablet  Commonly known as: LIPITOR     bisacodyl 10 MG suppository  Commonly known as: DULCOLAX     dextromethorphan-guaiFENesin  MG/5ML syrup  Commonly known as: ROBITUSSIN-DM     docusate 100 MG Caps  Commonly known as: COLACE, DULCOLAX  Take 100 mg by mouth 2 times daily as needed for Constipation. glimepiride 4 MG tablet  Commonly known as: AMARYL     Glucagon Emergency 1 MG Kit     glucose 40 % Gel  Commonly known as: GLUTOSE     insulin glargine 100 UNIT/ML injection vial  Commonly known as: LANTUS     ipratropium-albuterol 0.5-2.5 (3) MG/3ML Soln nebulizer solution  Commonly known as: DUONEB     levothyroxine 75 MCG tablet  Commonly known as: SYNTHROID     lidocaine 5 %  Commonly known as: LIDODERM     lisinopril 5 MG tablet  Commonly known as: PRINIVIL;ZESTRIL     loperamide 2 MG tablet  Commonly known as: IMODIUM A-D     magnesium hydroxide 400 MG/5ML suspension  Commonly known as: MILK OF MAGNESIA     Melatonin 10 MG Tabs     NONFORMULARY     omeprazole 20 MG delayed release capsule  Commonly known as: PRILOSEC     oxybutynin 10 MG extended release tablet  Commonly known as: DITROPAN-XL     polyethylene glycol 17 g packet  Commonly known as: GLYCOLAX     pregabalin 50 MG capsule  Commonly known as: LYRICA     torsemide 20 MG tablet  Commonly known as: Demadex  Take 1 tablet by mouth in the morning.                Where to Get Your Medications        These medications were sent to [address] [place] [address] [phone] [facility] [phone]  [address] [place] [id number]      Phone: [phone]   aluminum & magnesium hydroxide-simethicone 200-200-20 MG/5ML Susp suspension  carvedilol 6.25 MG tablet  ferrous sulfate 325 (65 Fe) MG tablet  fluconazole 200 MG tablet  meropenem 500 MG/50ML Solr IVPB (duplex)        Objective Findings at Discharge:   BP (!) 169/71   Pulse 72   Temp 98.4 °F (36.9 °C)   Resp 18   Ht 5' 7.99\" (1.727 m)   Wt 206 lb 9.1 oz (93.7 kg)   SpO2 97%   BMI 31.42 kg/m²       Physical Exam: Physical Exam  Vitals and nursing note reviewed. Constitutional:       General: He is not in acute distress. Appearance: Normal appearance. He is ill-appearing. HENT:      Head: Normocephalic and atraumatic. Mouth/Throat:      Mouth: Mucous membranes are moist.   Eyes:      Extraocular Movements: Extraocular movements intact. Pupils: Pupils are equal, round, and reactive to light. Cardiovascular:      Rate and Rhythm: Regular rhythm. Tachycardia present. Pulses: Normal pulses. Heart sounds: Normal heart sounds. Pulmonary:      Effort: Pulmonary effort is normal.      Breath sounds: Normal breath sounds. Abdominal:      Palpations: Abdomen is soft. Musculoskeletal:         General: No tenderness or signs of injury. Normal range of motion. Skin:     General: Skin is warm. Capillary Refill: Capillary refill takes less than 2 seconds. Neurological:      General: No focal deficit present. Mental Status: He is alert and oriented to person, place, and time. Psychiatric:         Mood and Affect: Mood normal.         Behavior: Behavior normal.         Thought Content: Thought content normal.         Judgment: Judgment normal.            Labs and Imaging   XR CHEST PORTABLE    Result Date: 8/28/2022  EXAMINATION: ONE XRAY VIEW OF THE CHEST 8/28/2022 3:37 pm COMPARISON: 04/18/2022 HISTORY: ORDERING SYSTEM PROVIDED HISTORY: emergency TECHNOLOGIST PROVIDED HISTORY: Reason for exam:->emergency Reason for Exam: fatigue, emergency, fever FINDINGS: Increased interstitial opacities seen throughout both lungs. A focal infiltrate is not identified. The cardial pericardial silhouette is unremarkable. No pneumothorax is seen. No free air. No acute bony abnormality. Increased interstitial opacity. While much or all of this may be chronic, please correlate with any clinical evidence of acute interstitial edema.         CBC:   Recent Labs     09/19/22  0930   WBC 8.2   HGB 9.8*      BMP:    Recent Labs     09/19/22  0930      K 3.8      CO2 28   BUN 28*   CREATININE 2.3*   GLUCOSE 173*     Hepatic: No results for input(s): AST, ALT, ALB, BILITOT, ALKPHOS in the last 72 hours. Lipids: No results found for: CHOL, HDL, TRIG  Hemoglobin A1C:   Lab Results   Component Value Date/Time    LABA1C 7.8 08/29/2022 01:30 AM     TSH: No results found for: TSH  Troponin:   Lab Results   Component Value Date/Time    TROPONINT 0.030 04/20/2022 07:30 AM    TROPONINT 0.040 04/18/2022 09:51 AM    TROPONINT 0.028 03/25/2022 04:24 PM     Lactic Acid: No results for input(s): LACTA in the last 72 hours. BNP: No results for input(s): PROBNP in the last 72 hours.   UA:  Lab Results   Component Value Date/Time    NITRU POSITIVE 09/11/2022 12:34 PM    NITRU NEGATIVE 03/23/2013 02:24 PM    COLORU BROWN 09/11/2022 12:34 PM    WBCUA 812 09/11/2022 12:34 PM    RBCUA 3,866 09/11/2022 12:34 PM    MUCUS RARE 08/28/2022 11:20 PM    TRICHOMONAS NONE SEEN 09/11/2022 12:34 PM    YEAST FEW 04/06/2022 04:45 PM    BACTERIA NEGATIVE 09/11/2022 12:34 PM    CLARITYU TURBID 09/11/2022 12:34 PM    SPECGRAV 1.010 09/11/2022 12:34 PM    LEUKOCYTESUR LARGE 09/11/2022 12:34 PM    UROBILINOGEN 2.0 09/11/2022 12:34 PM    BILIRUBINUR MODERATE 09/11/2022 12:34 PM    BLOODU LARGE 09/11/2022 12:34 PM    GLUCOSEU 1,000 03/23/2013 02:24 PM    KETUA TRACE 09/11/2022 12:34 PM    AMORPHOUS RARE 11/16/2021 12:28 PM     Urine Cultures: No results found for: Elfida Dominion  Blood Cultures: No results found for: BC  No results found for: BLOODCULT2  Organism:   Lab Results   Component Value Date/Time    [facility] 02/11/2018 04:35 AM       Time Spent Discharging patient 35 minutes    Electronically signed by Krupa Beckett MD on 9/21/2022 at 2:30 PM

## 2022-09-21 NOTE — PROGRESS NOTES
Infectious Disease Progress Note  2022   Patient Name: Natalie Greer : 1940     Assessment  Sepsis secondary to ESBL E. coli pyelonephritis associated with suprapubic cystostomy. Candida albicans Fungemia Source Unclear,  Possible Endocarditis:     MRSA Screen Positive:    History of prostate cancer status post RPP. ESBL E coli bacteremia. S/p cystoscopy, left ureteral stent exchange and peritoneal mccrary catheter exchange on 2022. Fever has worsened, CRP and pct is elevated. raises question of persistent ESBL bacteremia, CRE, fungal UTI and pneumonia. Hematuria: post procedure  CKD stage IIIB  T2DM  Diabetic neuropathy     Plan  Therapeutic: -d/c meropenem 1 g q 12 hours . Continue IV Fetroja 1 gm q8h x 14 days (end date 22)  Issues with insurance financial coverage for Fetroja and Eraxis, therefore second choice of ABX: transitioned to IV meropenem 2 gm q12h (renal dosing) with end date 22, and transitioned IV Eraxis to po Diflucan 400 mg daily (per guidelines as step down therapy after Eraxis) with end date 22. De-colonize MRSA ordered: Bactroban ointment to nares x 5 days, Hibiclens baths daily x 10 days  Diagnostic: MRSA nares positive, await repeat [facility]   Recommend consult with ophthalmology to evaluate for possible endophthalmitis   Reviewed RAFFI, no evidence of endocarditis. Extended dwell intact for IV ABX access    For DC: Follow up with ID in 2 weeks please  Weekly labs drawn on Monday during the course of treatment  CBC with differential, CMP, ESR, CRP  Fax results to Attn: Nirali Solomon Infectious Diseases Staff  # [phone]   OK from ID standpoint to DC when ready    Reason for visit: F/u ESBL E coli bacteremia and left pyelonephritis? History:? Interval history noted  Denies n/v/d/f or untoward effects of antimicrobials. [facility] is ready to be [facility] today. [facility] is feeling generally well.      Physical Exam:  Vital Signs: BP (!) 169/71   Pulse 72   Temp 98.4 °F (36.9 °C)   Resp 18   Ht 5' 7.99\" (1.727 m)   Wt 206 lb 9.1 oz (93.7 kg)   SpO2 97%   BMI 31.42 kg/m²     Gen:  alert, sitting up in the bed, no distress. Chest: no distress and CTA. Good air movement. Heart: RRR and no MRG. Abd: suprapubic catheter, soft, gaseous distension, no tenderness, no hepatomegaly. Normoactive bowel sounds. Ext: no clubbing, cyanosis, or edema  Catheter Site: without erythema or tenderness  Neuro: Mental status intact. CN 2-12 intact and no focal sensory or motor deficits     Radiologic / Imaging / TESTING  9/10/22 XR Chest:  Impression   1. Right IJ line with the tip overlying the distal SVC/right atrial region. 2. Low lung volumes with bibasilar atelectasis. No significant change. 9/10/22 CT Abdomen Pelvis WO Contrast:  Impression   1. Probable hematuria left urinary collecting system (possible active   hemorrhage into the left urinary collecting system) and mild-to-moderate left   hydronephrosis. 2. The double-J stent appears in unremarkable position without left ureter   calculus evident. 3. Trace abdominopelvic ascites is probably reactive. 4.  Vascular calcifications are noted reflecting calcific atherosclerosis. 5. Trace left pleural effusion with left basilar relaxation atelectasis or   infiltrate. 6. Minimal subsegmental atelectasis posterior right lung base. 7. Small hiatal hernia. 9/12/22 Echo Complete:   Summary   Left ventricular systolic function is normal.   Ejection fraction is visually estimated at 60%. Grade II diastolic dysfunction. Cannot rule out endocarditis on the tricuspid valve. No evidence of any pericardial effusion.     Labs:    Recent Results (from the past 24 hour(s))   POCT Glucose    Collection Time: 09/20/22 12:24 PM   Result Value Ref Range    POC Glucose 175 (H) 70 - 99 MG/DL   POCT Glucose    Collection Time: 09/20/22  4:46 PM   Result Value Ref Range    POC Glucose 184 (H) 70 - 99 MG/DL   POCT Glucose Collection Time: 09/20/22  8:11 PM   Result Value Ref Range    POC Glucose 161 (H) 70 - 99 MG/DL   POCT Glucose    Collection Time: 09/21/22  2:20 AM   Result Value Ref Range    POC Glucose 133 (H) 70 - 99 MG/DL   POCT Glucose    Collection Time: 09/21/22  8:01 AM   Result Value Ref Range    POC Glucose 131 (H) 70 - 99 MG/DL     CULTURE results: Invalid input(s): BLOOD CULTURE,  URINE CULTURE, SURGICAL CULTURE    Diagnosis:  Patient Active Problem List   Diagnosis    Gait disturbance    Generalized weakness    Diabetes mellitus (HCC)    Osteoarthritis    Anemia of chronic disorder    Infected implanted bladder sphincter cuff    Escherichia coli urinary tract infection    Hypertension    Sepsis ([address]    Acute encephalopathy    Type 2 diabetes mellitus with hypoglycemia without coma (HCC)    UTI (urinary tract infection) due to urinary indwelling catheter (HCC)    Hyperkalemia    Acute kidney failure (HCC)    Leg weakness, bilateral    Type 2 diabetes mellitus with neurological manifestations, uncontrolled ([address]    Complicated UTI (urinary tract infection)    Essential hypertension    Severe muscle deconditioning    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 (HCC)    Frequent falls    Chronic kidney disease, stage 3a (HCC)    Uncontrolled type 2 diabetes mellitus with peripheral neuropathy (HCC)    Prostate cancer (HCC)    Suprapubic catheter ([address]    Sepsis due to urinary tract infection (HCC)    Coagulase negative Staphylococcus bacteremia    Chronic kidney disease, stage IV (severe) (HCC)    Acute metabolic encephalopathy    SVT (supraventricular tachycardia) (HCC)    Metabolic encephalopathy    History of prostate cancer    Cigarette nicotine dependence without complication    Altered mental status    Serratia marcescens infection    Hypoglycemia    Fungemia       Active Problems  Principal Problem:    Complicated UTI (urinary tract infection)  Active Problems:    Anemia of chronic disorder    Fungemia Chronic kidney disease, stage 3a (HCC)    SVT (supraventricular tachycardia) (HCC)  Resolved Problems:    * No resolved hospital problems. *              Electronically signed by: Electronically signed by Johana Rodriguez.  VIVIANA Tate CNP on 9/21/2022 at 10:17 AM

## 2022-09-21 NOTE — CARE COORDINATION
CM reviewed notes. Pt will be going to [facility]. Pt may admit when medically cleared. LH  1430 pt will be discharged per Dr Jovan Zuñiga. Pt transport set for 6 PM with [facility] per Reginold Button. Rapid covid ordered. CM to see pt and update. CM updated nursing. Pt requested that this CM call his son Bridgett Garcia. CM called and updated him. CM called Tomasa at [facility] and informed of discharge/ time. Pt will be going to room 327 RC time. Envelope is in soft chart.  [address]

## 2022-09-21 NOTE — PLAN OF CARE
Problem: Discharge Planning  Goal: Discharge to home or other facility with appropriate resources  9/21/2022 1506 by Alonso Spring LPN  Outcome: Completed  9/21/2022 1015 by Alonso Spring LPN  Outcome: Progressing

## 2022-09-21 NOTE — PROGRESS NOTES
Upon discharge to [facility] with transportation in the building, this nurse and primary nurse noticed there was no prescription written for patient to continue to receive his merrem iv antibiotics in the nursing home as well as his midnite dose. This nurse paged Dr. Nitin Melchor and he said he was no longer in the building and from his understanding, nursing home did not need a prescription for patient to continue iv antibiotic therapy. This nurse called Nava Gomez RN in case management and he said patient did need a prescription. This nurse PS Dr. Nitin Melchor and said he would send his colleague up to the unit to write a prescription and I will then fax it to the facility. His colleague was unable to write the prescription, Nava Gomez RN also spoke with Nursing Supervisor about this situation and gave this nurse the fax number, but later found out that [facility] has merrem iv in stock and a prescription would not be needed. However Dr. Nitin Melchor said he would leave a prescription in the morning for the charge nurse  or have his colleague get a prescription tonite and for the charge nurse to fax it to the facility. it.

## 2022-09-22 ENCOUNTER — HOSPITAL ENCOUNTER (OUTPATIENT)
Age: 82
Setting detail: SPECIMEN
Discharge: HOME | End: 2022-09-22

## 2022-09-22 LAB
ALBUMIN SERPL-MCNC: 3.1 GM/DL (ref 3.4–5)
ALP BLD-CCNC: 95 IU/L (ref 40–129)
ALT SERPL-CCNC: 12 U/L (ref 10–40)
ANION GAP SERPL CALCULATED.3IONS-SCNC: 11 MMOL/L (ref 4–16)
AST SERPL-CCNC: 24 IU/L (ref 15–37)
BILIRUB SERPL-MCNC: 0.2 MG/DL (ref 0–1)
BUN BLDV-MCNC: 28 MG/DL (ref 6–23)
CALCIUM SERPL-MCNC: 8.7 MG/DL (ref 8.3–10.6)
CHLORIDE BLD-SCNC: 102 MMOL/L (ref 99–110)
CO2: 25 MMOL/L (ref 21–32)
CREAT SERPL-MCNC: 2.1 MG/DL (ref 0.9–1.3)
GFR AFRICAN AMERICAN: 37 ML/MIN/1.73M2
GFR NON-AFRICAN AMERICAN: 30 ML/MIN/1.73M2
GLUCOSE BLD-MCNC: 109 MG/DL (ref 70–99)
HCT VFR BLD CALC: 27.7 % (ref 42–52)
HEMOGLOBIN: 8.8 GM/DL (ref 13.5–18)
MCH RBC QN AUTO: 29.5 PG (ref 27–31)
MCHC RBC AUTO-ENTMCNC: 31.8 % (ref 32–36)
MCV RBC AUTO: 93 FL (ref 78–100)
PDW BLD-RTO: 14.8 % (ref 11.7–14.9)
PLATELET # BLD: 344 K/CU MM (ref 140–440)
PMV BLD AUTO: 10.7 FL (ref 7.5–11.1)
POTASSIUM SERPL-SCNC: 4.1 MMOL/L (ref 3.5–5.1)
RBC # BLD: 2.98 M/CU MM (ref 4.6–6.2)
SODIUM BLD-SCNC: 138 MMOL/L (ref 135–145)
TOTAL PROTEIN: 5.9 GM/DL (ref 6.4–8.2)
WBC # BLD: 8 K/CU MM (ref 4–10.5)

## 2022-09-22 PROCEDURE — 85027 COMPLETE CBC AUTOMATED: CPT

## 2022-09-22 PROCEDURE — 36415 COLL VENOUS BLD VENIPUNCTURE: CPT

## 2022-09-22 PROCEDURE — 80053 COMPREHEN METABOLIC PANEL: CPT

## 2022-09-22 NOTE — PROGRESS NOTES
To ensure continued patient care, I followed up on the notification from the nurse from previous day (as documented in the note from [address] dated 9/21 at 7.36pm) I discussed the matter in the morning with JILLIAN [facility] who spoke with the liaison for the nursing home and confirmed that the patient does not need a script for meropenem. So I will not fax the script to the nursing home today as it is no longer necessary.

## 2022-09-22 NOTE — CARE COORDINATION
CM - -[facility] --CM received a call from bedside nurse See Began requesting discharge assistance. Discharging physician wanted pt to continue IV medication Meropenum (Murium ) q 12 hours for [facility] destination---script-? CM called the charge Nurse at [facility] -order needs to be in AVS (Script not necessary)-or order or script can be faxed to [phone] -relayed information to See Began at [phone]. Pt was sent back to [facility].     Alexis Morales / Topher Ruiz

## 2022-09-26 ENCOUNTER — HOSPITAL ENCOUNTER (OUTPATIENT)
Age: 82
Setting detail: SPECIMEN
Discharge: HOME | End: 2022-09-26

## 2022-09-26 LAB
ALBUMIN SERPL-MCNC: 2.9 GM/DL (ref 3.4–5)
ALP BLD-CCNC: 108 IU/L (ref 40–128)
ALT SERPL-CCNC: 10 U/L (ref 10–40)
ANION GAP SERPL CALCULATED.3IONS-SCNC: 14 MMOL/L (ref 4–16)
AST SERPL-CCNC: 25 IU/L (ref 15–37)
BASOPHILS ABSOLUTE: 0.1 K/CU MM
BASOPHILS RELATIVE PERCENT: 1 % (ref 0–1)
BILIRUB SERPL-MCNC: 0.2 MG/DL (ref 0–1)
BUN BLDV-MCNC: 34 MG/DL (ref 6–23)
C-REACTIVE PROTEIN, HIGH SENSITIVITY: 8.1 MG/L
CALCIUM SERPL-MCNC: 9.2 MG/DL (ref 8.3–10.6)
CHLORIDE BLD-SCNC: 103 MMOL/L (ref 99–110)
CO2: 24 MMOL/L (ref 21–32)
CREAT SERPL-MCNC: 2.7 MG/DL (ref 0.9–1.3)
DIFFERENTIAL TYPE: ABNORMAL
EOSINOPHILS ABSOLUTE: 1.2 K/CU MM
EOSINOPHILS RELATIVE PERCENT: 13.6 % (ref 0–3)
ERYTHROCYTE SEDIMENTATION RATE: 53 MM/HR (ref 0–20)
GFR AFRICAN AMERICAN: 27 ML/MIN/1.73M2
GFR NON-AFRICAN AMERICAN: 23 ML/MIN/1.73M2
GLUCOSE BLD-MCNC: 136 MG/DL (ref 70–99)
HCT VFR BLD CALC: 31.5 % (ref 42–52)
HEMOGLOBIN: 9.8 GM/DL (ref 13.5–18)
IMMATURE NEUTROPHIL %: 0.5 % (ref 0–0.43)
LYMPHOCYTES ABSOLUTE: 2.6 K/CU MM
LYMPHOCYTES RELATIVE PERCENT: 29.1 % (ref 24–44)
MCH RBC QN AUTO: 30.2 PG (ref 27–31)
MCHC RBC AUTO-ENTMCNC: 31.1 % (ref 32–36)
MCV RBC AUTO: 97.2 FL (ref 78–100)
MONOCYTES ABSOLUTE: 0.6 K/CU MM
MONOCYTES RELATIVE PERCENT: 7.3 % (ref 0–4)
NUCLEATED RBC %: 0 %
PDW BLD-RTO: 15 % (ref 11.7–14.9)
PLATELET # BLD: 292 K/CU MM (ref 140–440)
PMV BLD AUTO: 11 FL (ref 7.5–11.1)
POTASSIUM SERPL-SCNC: 3.9 MMOL/L (ref 3.5–5.1)
RBC # BLD: 3.24 M/CU MM (ref 4.6–6.2)
SEGMENTED NEUTROPHILS ABSOLUTE COUNT: 4.3 K/CU MM
SEGMENTED NEUTROPHILS RELATIVE PERCENT: 48.5 % (ref 36–66)
SODIUM BLD-SCNC: 141 MMOL/L (ref 135–145)
TOTAL IMMATURE NEUTOROPHIL: 0.04 K/CU MM
TOTAL NUCLEATED RBC: 0 K/CU MM
TOTAL PROTEIN: 6.9 GM/DL (ref 6.4–8.2)
WBC # BLD: 8.8 K/CU MM (ref 4–10.5)

## 2022-09-26 PROCEDURE — 36415 COLL VENOUS BLD VENIPUNCTURE: CPT

## 2022-09-26 PROCEDURE — 86140 C-REACTIVE PROTEIN: CPT

## 2022-09-26 PROCEDURE — 80053 COMPREHEN METABOLIC PANEL: CPT

## 2022-09-26 PROCEDURE — 85652 RBC SED RATE AUTOMATED: CPT

## 2022-09-26 PROCEDURE — 85025 COMPLETE CBC W/AUTO DIFF WBC: CPT

## 2022-09-29 ENCOUNTER — HOSPITAL ENCOUNTER (OUTPATIENT)
Age: 82
Setting detail: SPECIMEN
Discharge: HOME | End: 2022-09-29

## 2022-09-29 LAB
ANION GAP SERPL CALCULATED.3IONS-SCNC: 12 MMOL/L (ref 4–16)
BUN BLDV-MCNC: 40 MG/DL (ref 6–23)
CALCIUM SERPL-MCNC: 8.7 MG/DL (ref 8.3–10.6)
CHLORIDE BLD-SCNC: 104 MMOL/L (ref 99–110)
CO2: 29 MMOL/L (ref 21–32)
CREAT SERPL-MCNC: 2.9 MG/DL (ref 0.9–1.3)
GFR AFRICAN AMERICAN: 25 ML/MIN/1.73M2
GFR NON-AFRICAN AMERICAN: 21 ML/MIN/1.73M2
GLUCOSE BLD-MCNC: 70 MG/DL (ref 70–99)
POTASSIUM SERPL-SCNC: 4.1 MMOL/L (ref 3.5–5.1)
REASON FOR REJECTION: NORMAL
REJECTED TEST: NORMAL
SODIUM BLD-SCNC: 145 MMOL/L (ref 135–145)

## 2022-09-29 PROCEDURE — 36415 COLL VENOUS BLD VENIPUNCTURE: CPT

## 2022-09-29 PROCEDURE — 80048 BASIC METABOLIC PNL TOTAL CA: CPT

## 2022-10-03 ENCOUNTER — HOSPITAL ENCOUNTER (OUTPATIENT)
Age: 82
Setting detail: SPECIMEN
Discharge: HOME | End: 2022-10-03

## 2022-10-03 LAB
ALBUMIN SERPL-MCNC: 3.2 GM/DL (ref 3.4–5)
ALP BLD-CCNC: 121 IU/L (ref 40–128)
ALT SERPL-CCNC: 17 U/L (ref 10–40)
ANION GAP SERPL CALCULATED.3IONS-SCNC: 11 MMOL/L (ref 4–16)
AST SERPL-CCNC: 19 IU/L (ref 15–37)
BASOPHILS ABSOLUTE: 0.1 K/CU MM
BASOPHILS RELATIVE PERCENT: 1 % (ref 0–1)
BILIRUB SERPL-MCNC: 0.2 MG/DL (ref 0–1)
BUN BLDV-MCNC: 43 MG/DL (ref 6–23)
C-REACTIVE PROTEIN, HIGH SENSITIVITY: 24.8 MG/L
CALCIUM SERPL-MCNC: 8.8 MG/DL (ref 8.3–10.6)
CHLORIDE BLD-SCNC: 106 MMOL/L (ref 99–110)
CO2: 27 MMOL/L (ref 21–32)
CREAT SERPL-MCNC: 2.5 MG/DL (ref 0.9–1.3)
DIFFERENTIAL TYPE: ABNORMAL
EOSINOPHILS ABSOLUTE: 1.6 K/CU MM
EOSINOPHILS RELATIVE PERCENT: 20 % (ref 0–3)
ERYTHROCYTE SEDIMENTATION RATE: 74 MM/HR (ref 0–20)
GFR AFRICAN AMERICAN: 30 ML/MIN/1.73M2
GFR NON-AFRICAN AMERICAN: 25 ML/MIN/1.73M2
GLUCOSE BLD-MCNC: 84 MG/DL (ref 70–99)
HCT VFR BLD CALC: 25.6 % (ref 42–52)
HEMOGLOBIN: 8 GM/DL (ref 13.5–18)
LYMPHOCYTES ABSOLUTE: 1.5 K/CU MM
LYMPHOCYTES RELATIVE PERCENT: 19 % (ref 24–44)
MCH RBC QN AUTO: 29.6 PG (ref 27–31)
MCHC RBC AUTO-ENTMCNC: 31.3 % (ref 32–36)
MCV RBC AUTO: 94.8 FL (ref 78–100)
MONOCYTES ABSOLUTE: 0.5 K/CU MM
MONOCYTES RELATIVE PERCENT: 6 % (ref 0–4)
PDW BLD-RTO: 15 % (ref 11.7–14.9)
PLATELET # BLD: 241 K/CU MM (ref 140–440)
PMV BLD AUTO: 12.4 FL (ref 7.5–11.1)
POTASSIUM SERPL-SCNC: 4.8 MMOL/L (ref 3.5–5.1)
RBC # BLD: 2.7 M/CU MM (ref 4.6–6.2)
SEGMENTED NEUTROPHILS ABSOLUTE COUNT: 4.3 K/CU MM
SEGMENTED NEUTROPHILS RELATIVE PERCENT: 54 % (ref 36–66)
SODIUM BLD-SCNC: 144 MMOL/L (ref 135–145)
TOTAL PROTEIN: 5.9 GM/DL (ref 6.4–8.2)
WBC # BLD: 8 K/CU MM (ref 4–10.5)

## 2022-10-03 PROCEDURE — 85007 BL SMEAR W/DIFF WBC COUNT: CPT

## 2022-10-03 PROCEDURE — 36415 COLL VENOUS BLD VENIPUNCTURE: CPT

## 2022-10-03 PROCEDURE — 86140 C-REACTIVE PROTEIN: CPT

## 2022-10-03 PROCEDURE — 85652 RBC SED RATE AUTOMATED: CPT

## 2022-10-03 PROCEDURE — 80053 COMPREHEN METABOLIC PANEL: CPT

## 2022-10-03 PROCEDURE — 85027 COMPLETE CBC AUTOMATED: CPT

## 2022-10-05 ENCOUNTER — OFFICE VISIT (OUTPATIENT)
Dept: INFECTIOUS DISEASES | Age: 82
End: 2022-10-05
Payer: MEDICARE

## 2022-10-05 VITALS
SYSTOLIC BLOOD PRESSURE: 118 MMHG | TEMPERATURE: 97 F | DIASTOLIC BLOOD PRESSURE: 61 MMHG | RESPIRATION RATE: 18 BRPM | HEART RATE: 59 BPM

## 2022-10-05 DIAGNOSIS — Z09 HOSPITAL DISCHARGE FOLLOW-UP: Primary | ICD-10-CM

## 2022-10-05 DIAGNOSIS — N18.31 CHRONIC KIDNEY DISEASE, STAGE 3A (HCC): ICD-10-CM

## 2022-10-05 DIAGNOSIS — B96.20 ESCHERICHIA COLI URINARY TRACT INFECTION: ICD-10-CM

## 2022-10-05 DIAGNOSIS — N39.0 ESCHERICHIA COLI URINARY TRACT INFECTION: ICD-10-CM

## 2022-10-05 PROCEDURE — G8484 FLU IMMUNIZE NO ADMIN: HCPCS | Performed by: INTERNAL MEDICINE

## 2022-10-05 PROCEDURE — 1123F ACP DISCUSS/DSCN MKR DOCD: CPT | Performed by: INTERNAL MEDICINE

## 2022-10-05 PROCEDURE — 1036F TOBACCO NON-USER: CPT | Performed by: INTERNAL MEDICINE

## 2022-10-05 PROCEDURE — G8417 CALC BMI ABV UP PARAM F/U: HCPCS | Performed by: INTERNAL MEDICINE

## 2022-10-05 PROCEDURE — 99213 OFFICE O/P EST LOW 20 MIN: CPT | Performed by: INTERNAL MEDICINE

## 2022-10-05 PROCEDURE — 1111F DSCHRG MED/CURRENT MED MERGE: CPT | Performed by: INTERNAL MEDICINE

## 2022-10-05 PROCEDURE — G8427 DOCREV CUR MEDS BY ELIG CLIN: HCPCS | Performed by: INTERNAL MEDICINE

## 2022-10-05 NOTE — PROGRESS NOTES
10/5/2022         Referring Physician: No ref. provider found  Primary Care Physician: Chet House MD    Impression/Plan:   Diagnosis Orders   1. Hospital discharge follow-up        2. Chronic kidney disease, stage 3a ([facility]        3. Escherichia coli urinary tract infection            Discussion:  Hospital discharge follow-up  Clinically resolved. See prn. Return if symptoms worsen or fail to improve. 30 minutes was spent in the encounter; more than 50% of the face-to-face time was spent with the patient providing counseling and coordination of care. History: Janina Ruiz is a 78 y.o.  male presenting today for follow-up. Medical history of prostate cancer status post prostatectomy, requiring a suprapubic urinary catheter. He was discharged on 4/22/2022 after a 4-day stay for acute metabolic encephalopathy. He was seen by the ID service for Serratia marcescens suprapubic catheter associated urinary tract infection. Initially received meropenem, followed by cefepime while in the hospital.  He was discharged to complete Bactrim 1 DS every 12 hours on 5/4/2022. He was discharged to [facility] home and was actually discharged from there on 5/19/2022.   10/5/22: He was discharged from the hospital on 9/21/2022 after a 24-day stay for sepsis secondary to ESBL E. coli bacteremia due to pyelonephritis and candidemia. He received Fetroja for for 14 days through 9/24/2022. Received Eraxis initially, was transitioned to Diflucan 400 mg daily through 9/27/2022. Completed abx. Review of Systems   All other systems reviewed and are negative.     No Known Allergies    Patient Active Problem List   Diagnosis    Gait disturbance    Generalized weakness    Diabetes mellitus (HCC)    Osteoarthritis    Anemia of chronic disorder    Infected implanted bladder sphincter cuff    Escherichia coli urinary tract infection    Hypertension    Sepsis ([facility] [address]    Acute encephalopathy    Type 2 diabetes mellitus with hypoglycemia without coma ([facility]    UTI (urinary tract infection) due to urinary indwelling catheter ([facility])    Hyperkalemia    Acute kidney failure ([facility])    Leg weakness, bilateral    Type 2 diabetes mellitus with neurological manifestations, uncontrolled    Complicated UTI (urinary tract infection)    Essential hypertension    Severe muscle deconditioning    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 ([facility])    Frequent falls    Chronic kidney disease, stage 3a ([facility] [address]    Uncontrolled type 2 diabetes mellitus with peripheral neuropathy    Prostate cancer ([facility]    Suprapubic catheter ([facility]    Sepsis due to urinary tract infection ([facility]    Coagulase negative Staphylococcus bacteremia    Chronic kidney disease, stage IV (severe) ([facility])    Acute metabolic encephalopathy    SVT (supraventricular tachycardia) ([facility])    Metabolic encephalopathy    History of prostate cancer    Cigarette nicotine dependence without complication    Altered mental status    Serratia marcescens infection    Hypoglycemia    Hospital discharge follow-up    Fungemia       Current Outpatient Medications   Medication Sig Dispense Refill    aluminum & magnesium hydroxide-simethicone (MAALOX) 200-200-20 MG/5ML SUSP suspension Take 30 mLs by mouth every 6 hours as needed for Indigestion 300 mL 3    carvedilol (COREG) 6.25 MG tablet Take 1 tablet by mouth 2 times daily (with meals) 60 tablet 3    ferrous sulfate (IRON 325) 325 (65 Fe) MG tablet Take 1 tablet by mouth 2 times daily (with meals) 30 tablet 3    atorvastatin (LIPITOR) 80 MG tablet take 1 tablet by mouth once daily for HIGH CHOLESTEROL      glimepiride (AMARYL) 4 MG tablet take 1 tablet by mouth every morning for DIABETES MELLTIUS      lisinopril (PRINIVIL;ZESTRIL) 5 MG tablet take 1 tablet by mouth every morning for high blood pressure      pregabalin (LYRICA) 50 MG capsule take 1 capsule by mouth twice a day for NERVE PAIN      torsemide (DEMADEX) 20 MG tablet Take 1 tablet by mouth in the morning. 90 tablet 5    dextromethorphan-guaiFENesin (ROBITUSSIN-DM)  MG/5ML syrup Take 10 mLs by mouth every 4 hours as needed for Cough      Glucagon, rDNA, (GLUCAGON EMERGENCY) 1 MG KIT Inject as directed as needed      loperamide (IMODIUM A-D) 2 MG tablet Take 2 mg by mouth 4 times daily as needed for Diarrhea      insulin glargine (LANTUS) 100 UNIT/ML injection vial Inject 30 Units into the skin daily      Melatonin 10 MG TABS Take 1 tablet by mouth at bedtime      omeprazole (PRILOSEC) 20 MG delayed release capsule Take 40 mg by mouth daily      levothyroxine (SYNTHROID) 75 MCG tablet Take 75 mcg by mouth Daily      NONFORMULARY Liquid Protein bid      aspirin 81 MG chewable tablet Take 1 tablet by mouth daily 30 tablet 3    amLODIPine (NORVASC) 10 MG tablet Take 10 mg by mouth daily      polyethylene glycol (GLYCOLAX) 17 g packet Take 17 g by mouth 2 times daily And PRN      ipratropium-albuterol (DUONEB) 0.5-2.5 (3) MG/3ML SOLN nebulizer solution Inhale 1 vial into the lungs 2 times daily      lidocaine (LIDODERM) 5 % Place 1 patch onto the skin daily Indications: L flank 12 hours on, 12 hours off.      oxybutynin (DITROPAN-XL) 10 MG extended release tablet Take 10 mg by mouth daily      magnesium hydroxide (MILK OF MAGNESIA) 400 MG/5ML suspension Take 30 mLs by mouth daily as needed for Constipation      bisacodyl (DULCOLAX) 10 MG suppository Place 10 mg rectally daily      glucose (GLUTOSE) 40 % GEL Take 15 g by mouth as needed      allopurinol (ZYLOPRIM) 100 MG tablet Take 100 mg by mouth daily      acetaminophen (TYLENOL) 325 MG tablet Take 2 tablets by mouth every 6 hours as needed for Pain 120 tablet 3    docusate sodium (COLACE, DULCOLAX) 100 MG CAPS Take 100 mg by mouth 2 times daily as needed for Constipation. 20 capsule 0     No current facility-administered medications for this visit.        Past Medical History:   Diagnosis Date    Acute urinary tract infection 3/15/2012 Ataxia 2010    Diabetes mellitus ([facility] [address] 2011    type 2, controlled    Fusion of spine of cervical region 10/2012    Gait disturbance     History of prostate cancer 1996    adenocarcinoma    History of tobacco use     Hyperlipidemia     Osteoarthritis        Past Surgical History:   Procedure Laterality Date    BLADDER SURGERY      BLADDER SURGERY Left 3/17/2022    CYSTOSCOPY LEFT STENT EXCHANGE performed by Mei Prince MD at [address] Left 2022    LEFT CYSTOSCOPY URETERAL STENT EXCHANGE AND [address] performed by Munir Mayberry MD at [facility] [address]  3/28/13    Cysto with removal of artificial sphinter and placement of suprapubic catheter.     PROSTATE SURGERY      PROSTATECTOMY  1996    infection       Social History     Socioeconomic History    Marital status:      Spouse name: Not on file    Number of children: 3    Years of education: Not on file    Highest education level: Not on file   Occupational History    Not on file   Tobacco Use    Smoking status: Former     Packs/day: 0.50     Types: Cigarettes     Quit date: 1976     Years since quittin.2    Smokeless tobacco: Never   Vaping Use    Vaping Use: Not on file   Substance and Sexual Activity    Alcohol use: No     Comment: Quit in     Drug use: No    Sexual activity: Not on file   Other Topics Concern    Not on file   Social History Narrative    Not on file     Social Determinants of Health     Financial Resource Strain: Not on file   Food Insecurity: Not on file   Transportation Needs: Not on file   Physical Activity: Not on file   Stress: Not on file   Social Connections: Not on file   Intimate Partner Violence: Not on file   Housing Stability: Not on file       Family History   Problem Relation Age of Onset    Cancer Mother     Diabetes Father     Heart Disease Father     High Blood Pressure Father     Diabetes Sister     High Blood Pressure Sister     Cancer Brother         prostate, breast    Diabetes Brother     Cancer Maternal Uncle     Diabetes Maternal Grandmother     Stroke Maternal Grandfather        Vital Signs:  Vitals:    10/05/22 1005   BP: 118/61   Site: Right Lower Arm   Position: Sitting   Cuff Size: Medium Adult   Pulse: 59   Resp: 18   Temp: 97 °F (36.1 °C)   TempSrc: Infrared          Wt Readings from Last 3 Encounters:   09/20/22 206 lb 9.1 oz (93.7 kg)   08/09/22 197 lb 12.8 oz (89.7 kg)   05/25/22 196 lb 6.4 oz (89.1 kg)        Physical Exam:   Gen: alert and NAD  HEENT: sclera clear, pupils equal and reactive, extra ocular muscles intact, oropharynx clear, mucus membranes moist, tympanic membranes clear bilaterally, no cervical lymphadenopathy noted and neck supple  Neck: supple, no significant adenopathy  Chest: clear to auscultation, no wheezes, rales or rhonchi, symmetric air entry  Heart: regular rate and rhythm, no murmurs  ABD: suprapubic cystostomy, abdomen is soft without significant tenderness, masses, organomegaly or guarding.   EXT:peripheral pulses normal, no pedal edema, no clubbing or cyanosis  NEURO: alert, oriented, normal speech, no focal findings or movement disorder noted  Skin: well hydrated, no lesions, surgical site examined  Wounds: none  Labs:   WBC   Date Value Ref Range Status   10/03/2022 8.0 4.0 - 10.5 K/CU MM Final   09/26/2022 8.8 4.0 - 10.5 K/CU MM Final   09/22/2022 8.0 4.0 - 10.5 K/CU MM Final     Creatinine   Date Value Ref Range Status   10/03/2022 2.5 (H) 0.9 - 1.3 MG/DL Final   09/29/2022 2.9 (H) 0.9 - 1.3 MG/DL Final   09/26/2022 2.7 (H) 0.9 - 1.3 MG/DL Final       Cultures:  Culture   Date Value Ref Range Status   09/13/2022 NO GROWTH AT 5 DAYS  Final   09/13/2022 NO GROWTH AT 5 DAYS  Final   09/12/2022 Final Report No growth at 96 hours  Final       Imaging Studies:

## 2022-10-06 ENCOUNTER — HOSPITAL ENCOUNTER (OUTPATIENT)
Age: 82
Setting detail: SPECIMEN
Discharge: HOME | End: 2022-10-06

## 2022-10-06 LAB
ANION GAP SERPL CALCULATED.3IONS-SCNC: 11 MMOL/L (ref 4–16)
BUN BLDV-MCNC: 46 MG/DL (ref 6–23)
CALCIUM SERPL-MCNC: 8.7 MG/DL (ref 8.3–10.6)
CHLORIDE BLD-SCNC: 106 MMOL/L (ref 99–110)
CO2: 26 MMOL/L (ref 21–32)
CREAT SERPL-MCNC: 2.6 MG/DL (ref 0.9–1.3)
GFR AFRICAN AMERICAN: 29 ML/MIN/1.73M2
GFR NON-AFRICAN AMERICAN: 24 ML/MIN/1.73M2
GLUCOSE BLD-MCNC: 32 MG/DL (ref 70–99)
POTASSIUM SERPL-SCNC: 4.6 MMOL/L (ref 3.5–5.1)
SODIUM BLD-SCNC: 143 MMOL/L (ref 135–145)

## 2022-10-06 PROCEDURE — 36415 COLL VENOUS BLD VENIPUNCTURE: CPT

## 2022-10-06 PROCEDURE — 80048 BASIC METABOLIC PNL TOTAL CA: CPT

## 2022-10-07 ENCOUNTER — HOSPITAL ENCOUNTER (OUTPATIENT)
Age: 82
Setting detail: SPECIMEN
Discharge: HOME | End: 2022-10-07

## 2022-10-07 LAB
ANION GAP SERPL CALCULATED.3IONS-SCNC: 12 MMOL/L (ref 4–16)
BASOPHILS ABSOLUTE: 0.1 K/CU MM
BASOPHILS RELATIVE PERCENT: 0.6 % (ref 0–1)
BUN BLDV-MCNC: 45 MG/DL (ref 6–23)
CALCIUM SERPL-MCNC: 8.7 MG/DL (ref 8.3–10.6)
CHLORIDE BLD-SCNC: 109 MMOL/L (ref 99–110)
CO2: 24 MMOL/L (ref 21–32)
CREAT SERPL-MCNC: 2.5 MG/DL (ref 0.9–1.3)
DIFFERENTIAL TYPE: ABNORMAL
EOSINOPHILS ABSOLUTE: 1.2 K/CU MM
EOSINOPHILS RELATIVE PERCENT: 15.8 % (ref 0–3)
GFR AFRICAN AMERICAN: 30 ML/MIN/1.73M2
GFR NON-AFRICAN AMERICAN: 25 ML/MIN/1.73M2
GLUCOSE BLD-MCNC: 46 MG/DL (ref 70–99)
HCT VFR BLD CALC: 24.6 % (ref 42–52)
HEMOGLOBIN: 7.8 GM/DL (ref 13.5–18)
IMMATURE NEUTROPHIL %: 0.1 % (ref 0–0.43)
LYMPHOCYTES ABSOLUTE: 1.6 K/CU MM
LYMPHOCYTES RELATIVE PERCENT: 20.7 % (ref 24–44)
MCH RBC QN AUTO: 30.7 PG (ref 27–31)
MCHC RBC AUTO-ENTMCNC: 31.7 % (ref 32–36)
MCV RBC AUTO: 96.9 FL (ref 78–100)
MONOCYTES ABSOLUTE: 0.6 K/CU MM
MONOCYTES RELATIVE PERCENT: 7.4 % (ref 0–4)
NUCLEATED RBC %: 0 %
PDW BLD-RTO: 15.4 % (ref 11.7–14.9)
PLATELET # BLD: 217 K/CU MM (ref 140–440)
PMV BLD AUTO: 12.5 FL (ref 7.5–11.1)
POTASSIUM SERPL-SCNC: 4.9 MMOL/L (ref 3.5–5.1)
RBC # BLD: 2.54 M/CU MM (ref 4.6–6.2)
SEGMENTED NEUTROPHILS ABSOLUTE COUNT: 4.3 K/CU MM
SEGMENTED NEUTROPHILS RELATIVE PERCENT: 55.4 % (ref 36–66)
SODIUM BLD-SCNC: 145 MMOL/L (ref 135–145)
TOTAL IMMATURE NEUTOROPHIL: 0.01 K/CU MM
TOTAL NUCLEATED RBC: 0 K/CU MM
WBC # BLD: 7.7 K/CU MM (ref 4–10.5)

## 2022-10-07 PROCEDURE — 80048 BASIC METABOLIC PNL TOTAL CA: CPT

## 2022-10-07 PROCEDURE — 36415 COLL VENOUS BLD VENIPUNCTURE: CPT

## 2022-10-07 PROCEDURE — 85025 COMPLETE CBC W/AUTO DIFF WBC: CPT

## 2022-10-08 ENCOUNTER — HOSPITAL ENCOUNTER (OUTPATIENT)
Age: 82
Setting detail: SPECIMEN
Discharge: HOME | End: 2022-10-08
Payer: MEDICARE

## 2022-10-08 PROCEDURE — 82270 OCCULT BLOOD FECES: CPT

## 2022-10-09 ENCOUNTER — HOSPITAL ENCOUNTER (OUTPATIENT)
Age: 82
Setting detail: SPECIMEN
Discharge: HOME | End: 2022-10-09
Payer: MEDICARE

## 2022-10-09 PROCEDURE — 81001 URINALYSIS AUTO W/SCOPE: CPT

## 2022-10-09 PROCEDURE — 87086 URINE CULTURE/COLONY COUNT: CPT

## 2022-10-09 PROCEDURE — 87088 URINE BACTERIA CULTURE: CPT

## 2022-10-09 PROCEDURE — 87186 SC STD MICRODIL/AGAR DIL: CPT

## 2022-10-10 ENCOUNTER — HOSPITAL ENCOUNTER (OUTPATIENT)
Age: 82
Setting detail: SPECIMEN
Discharge: HOME | End: 2022-10-10

## 2022-10-10 LAB
ALBUMIN SERPL-MCNC: 3.4 GM/DL (ref 3.4–5)
ALP BLD-CCNC: 104 IU/L (ref 40–128)
ALT SERPL-CCNC: 12 U/L (ref 10–40)
ANION GAP SERPL CALCULATED.3IONS-SCNC: 13 MMOL/L (ref 4–16)
AST SERPL-CCNC: 9 IU/L (ref 15–37)
BACTERIA: ABNORMAL /HPF
BASOPHILS ABSOLUTE: 0 K/CU MM
BASOPHILS RELATIVE PERCENT: 0.4 % (ref 0–1)
BILIRUB SERPL-MCNC: 0.2 MG/DL (ref 0–1)
BILIRUBIN URINE: NEGATIVE MG/DL
BLOOD, URINE: ABNORMAL
BUN BLDV-MCNC: 42 MG/DL (ref 6–23)
C-REACTIVE PROTEIN, HIGH SENSITIVITY: 11.6 MG/L
CALCIUM SERPL-MCNC: 8.9 MG/DL (ref 8.3–10.6)
CHLORIDE BLD-SCNC: 107 MMOL/L (ref 99–110)
CLARITY: ABNORMAL
CO2: 24 MMOL/L (ref 21–32)
COLOR: YELLOW
CREAT SERPL-MCNC: 2.2 MG/DL (ref 0.9–1.3)
DIFFERENTIAL TYPE: ABNORMAL
EOSINOPHILS ABSOLUTE: 1.2 K/CU MM
EOSINOPHILS RELATIVE PERCENT: 14.8 % (ref 0–3)
ERYTHROCYTE SEDIMENTATION RATE: 111 MM/HR (ref 0–20)
GFR AFRICAN AMERICAN: 35 ML/MIN/1.73M2
GFR NON-AFRICAN AMERICAN: 29 ML/MIN/1.73M2
GLUCOSE BLD-MCNC: 146 MG/DL (ref 70–99)
GLUCOSE, URINE: 250 MG/DL
HCT VFR BLD CALC: 25.4 % (ref 42–52)
HEMOGLOBIN: 8 GM/DL (ref 13.5–18)
IMMATURE NEUTROPHIL %: 0.2 % (ref 0–0.43)
KETONES, URINE: NEGATIVE MG/DL
LEUKOCYTE ESTERASE, URINE: ABNORMAL
LYMPHOCYTES ABSOLUTE: 2 K/CU MM
LYMPHOCYTES RELATIVE PERCENT: 24.6 % (ref 24–44)
MCH RBC QN AUTO: 30.7 PG (ref 27–31)
MCHC RBC AUTO-ENTMCNC: 31.5 % (ref 32–36)
MCV RBC AUTO: 97.3 FL (ref 78–100)
MONOCYTES ABSOLUTE: 0.7 K/CU MM
MONOCYTES RELATIVE PERCENT: 8.3 % (ref 0–4)
NITRITE URINE, QUANTITATIVE: NEGATIVE
NUCLEATED RBC %: 0 %
PDW BLD-RTO: 15.6 % (ref 11.7–14.9)
PH, URINE: 6 (ref 5–8)
PLATELET # BLD: 193 K/CU MM (ref 140–440)
PMV BLD AUTO: 12.1 FL (ref 7.5–11.1)
POTASSIUM SERPL-SCNC: 4.8 MMOL/L (ref 3.5–5.1)
PROTEIN UA: 30 MG/DL
RBC # BLD: 2.61 M/CU MM (ref 4.6–6.2)
RBC URINE: 12 /HPF (ref 0–3)
SEGMENTED NEUTROPHILS ABSOLUTE COUNT: 4.2 K/CU MM
SEGMENTED NEUTROPHILS RELATIVE PERCENT: 51.7 % (ref 36–66)
SODIUM BLD-SCNC: 144 MMOL/L (ref 135–145)
SPECIFIC GRAVITY UA: 1.01 (ref 1–1.03)
TOTAL IMMATURE NEUTOROPHIL: 0.02 K/CU MM
TOTAL NUCLEATED RBC: 0 K/CU MM
TOTAL PROTEIN: 6.2 GM/DL (ref 6.4–8.2)
TRICHOMONAS: ABNORMAL /HPF
UROBILINOGEN, URINE: NORMAL MG/DL (ref 0.2–1)
WBC # BLD: 8.1 K/CU MM (ref 4–10.5)
WBC CLUMP: ABNORMAL /HPF
WBC UA: 503 /HPF (ref 0–2)

## 2022-10-10 PROCEDURE — 85025 COMPLETE CBC W/AUTO DIFF WBC: CPT

## 2022-10-10 PROCEDURE — 86140 C-REACTIVE PROTEIN: CPT

## 2022-10-10 PROCEDURE — 85652 RBC SED RATE AUTOMATED: CPT

## 2022-10-10 PROCEDURE — 36415 COLL VENOUS BLD VENIPUNCTURE: CPT

## 2022-10-10 PROCEDURE — 80053 COMPREHEN METABOLIC PANEL: CPT

## 2022-10-12 LAB
CULTURE: ABNORMAL
CULTURE: ABNORMAL
Lab: ABNORMAL
SPECIMEN: ABNORMAL

## 2022-10-13 ENCOUNTER — HOSPITAL ENCOUNTER (OUTPATIENT)
Age: 82
Setting detail: SPECIMEN
Discharge: HOME | End: 2022-10-13

## 2022-10-13 LAB
ANION GAP SERPL CALCULATED.3IONS-SCNC: 8 MMOL/L (ref 4–16)
BUN BLDV-MCNC: 46 MG/DL (ref 6–23)
CALCIUM SERPL-MCNC: 8.7 MG/DL (ref 8.3–10.6)
CHLORIDE BLD-SCNC: 107 MMOL/L (ref 99–110)
CO2: 26 MMOL/L (ref 21–32)
CREAT SERPL-MCNC: 2.4 MG/DL (ref 0.9–1.3)
GFR AFRICAN AMERICAN: 31 ML/MIN/1.73M2
GFR NON-AFRICAN AMERICAN: 26 ML/MIN/1.73M2
GLUCOSE BLD-MCNC: 78 MG/DL (ref 70–99)
POTASSIUM SERPL-SCNC: 4.9 MMOL/L (ref 3.5–5.1)
SODIUM BLD-SCNC: 141 MMOL/L (ref 135–145)

## 2022-10-13 PROCEDURE — 36415 COLL VENOUS BLD VENIPUNCTURE: CPT

## 2022-10-13 PROCEDURE — 80048 BASIC METABOLIC PNL TOTAL CA: CPT

## 2022-10-14 LAB
HEMOCCULT SP1 STL QL: NEGATIVE
OCCULT BLOOD 2: NEGATIVE
OCCULT BLOOD 3: NEGATIVE

## 2022-10-17 ENCOUNTER — HOSPITAL ENCOUNTER (OUTPATIENT)
Age: 82
Setting detail: SPECIMEN
Discharge: HOME | End: 2022-10-17

## 2022-10-17 LAB
ALBUMIN SERPL-MCNC: 3.1 GM/DL (ref 3.4–5)
ALP BLD-CCNC: 102 IU/L (ref 40–128)
ALT SERPL-CCNC: 11 U/L (ref 10–40)
ANION GAP SERPL CALCULATED.3IONS-SCNC: 9 MMOL/L (ref 4–16)
AST SERPL-CCNC: 13 IU/L (ref 15–37)
BILIRUB SERPL-MCNC: 0.2 MG/DL (ref 0–1)
BUN BLDV-MCNC: 43 MG/DL (ref 6–23)
C-REACTIVE PROTEIN, HIGH SENSITIVITY: 22.1 MG/L
CALCIUM SERPL-MCNC: 8.8 MG/DL (ref 8.3–10.6)
CHLORIDE BLD-SCNC: 108 MMOL/L (ref 99–110)
CO2: 24 MMOL/L (ref 21–32)
CREAT SERPL-MCNC: 2.1 MG/DL (ref 0.9–1.3)
DIFFERENTIAL TYPE: ABNORMAL
EOSINOPHILS ABSOLUTE: 2.1 K/CU MM
EOSINOPHILS RELATIVE PERCENT: 25 % (ref 0–3)
ERYTHROCYTE SEDIMENTATION RATE: 77 MM/HR (ref 0–20)
GLUCOSE BLD-MCNC: 107 MG/DL (ref 70–99)
HCT VFR BLD CALC: 23.3 % (ref 42–52)
HEMOGLOBIN: 7.2 GM/DL (ref 13.5–18)
LYMPHOCYTES ABSOLUTE: 1.6 K/CU MM
LYMPHOCYTES RELATIVE PERCENT: 19 % (ref 24–44)
MCH RBC QN AUTO: 30.3 PG (ref 27–31)
MCHC RBC AUTO-ENTMCNC: 30.9 % (ref 32–36)
MCV RBC AUTO: 97.9 FL (ref 78–100)
MONOCYTES ABSOLUTE: 0.4 K/CU MM
MONOCYTES RELATIVE PERCENT: 5 % (ref 0–4)
PDW BLD-RTO: 15.9 % (ref 11.7–14.9)
PLATELET # BLD: 193 K/CU MM (ref 140–440)
PMV BLD AUTO: 12.1 FL (ref 7.5–11.1)
POTASSIUM SERPL-SCNC: 5.4 MMOL/L (ref 3.5–5.1)
RBC # BLD: 2.38 M/CU MM (ref 4.6–6.2)
SEGMENTED NEUTROPHILS ABSOLUTE COUNT: 4.2 K/CU MM
SEGMENTED NEUTROPHILS RELATIVE PERCENT: 51 % (ref 36–66)
SODIUM BLD-SCNC: 141 MMOL/L (ref 135–145)
TOTAL PROTEIN: 5.9 GM/DL (ref 6.4–8.2)
WBC # BLD: 8.3 K/CU MM (ref 4–10.5)

## 2022-10-17 PROCEDURE — 85027 COMPLETE CBC AUTOMATED: CPT

## 2022-10-17 PROCEDURE — 86140 C-REACTIVE PROTEIN: CPT

## 2022-10-17 PROCEDURE — 85652 RBC SED RATE AUTOMATED: CPT

## 2022-10-17 PROCEDURE — 80053 COMPREHEN METABOLIC PANEL: CPT

## 2022-10-17 PROCEDURE — 85007 BL SMEAR W/DIFF WBC COUNT: CPT

## 2022-10-17 PROCEDURE — 36415 COLL VENOUS BLD VENIPUNCTURE: CPT

## 2022-10-18 ENCOUNTER — HOSPITAL ENCOUNTER (OUTPATIENT)
Age: 82
Setting detail: SPECIMEN
Discharge: HOME | End: 2022-10-18

## 2022-10-18 LAB — POTASSIUM SERPL-SCNC: 4.6 MMOL/L (ref 3.5–5.1)

## 2022-10-18 PROCEDURE — 36415 COLL VENOUS BLD VENIPUNCTURE: CPT

## 2022-10-18 PROCEDURE — 84132 ASSAY OF SERUM POTASSIUM: CPT

## 2022-10-20 ENCOUNTER — HOSPITAL ENCOUNTER (OUTPATIENT)
Age: 82
Setting detail: SPECIMEN
Discharge: HOME | End: 2022-10-20

## 2022-10-20 LAB
ANION GAP SERPL CALCULATED.3IONS-SCNC: 10 MMOL/L (ref 4–16)
BUN BLDV-MCNC: 40 MG/DL (ref 6–23)
CALCIUM SERPL-MCNC: 8.5 MG/DL (ref 8.3–10.6)
CHLORIDE BLD-SCNC: 111 MMOL/L (ref 99–110)
CO2: 25 MMOL/L (ref 21–32)
CREAT SERPL-MCNC: 2 MG/DL (ref 0.9–1.3)
GFR SERPL CREATININE-BSD FRML MDRD: 32 ML/MIN/1.73M2
GLUCOSE BLD-MCNC: 122 MG/DL (ref 70–99)
POTASSIUM SERPL-SCNC: 4.7 MMOL/L (ref 3.5–5.1)
SODIUM BLD-SCNC: 146 MMOL/L (ref 135–145)

## 2022-10-20 PROCEDURE — 80048 BASIC METABOLIC PNL TOTAL CA: CPT

## 2022-10-20 PROCEDURE — 36415 COLL VENOUS BLD VENIPUNCTURE: CPT

## 2022-10-24 ENCOUNTER — APPOINTMENT (OUTPATIENT)
Dept: CT IMAGING | Age: 82
DRG: 637 | End: 2022-10-24
Payer: MEDICARE

## 2022-10-24 ENCOUNTER — HOSPITAL ENCOUNTER (INPATIENT)
Age: 82
LOS: 12 days | Discharge: NURSING, REHAB OR LONG-TERM CARE | DRG: 637 | End: 2022-11-05
Attending: EMERGENCY MEDICINE | Admitting: STUDENT IN AN ORGANIZED HEALTH CARE EDUCATION/TRAINING PROGRAM
Payer: MEDICARE

## 2022-10-24 ENCOUNTER — HOSPITAL ENCOUNTER (OUTPATIENT)
Age: 82
Setting detail: SPECIMEN
Discharge: HOME | End: 2022-10-24

## 2022-10-24 ENCOUNTER — APPOINTMENT (OUTPATIENT)
Dept: GENERAL RADIOLOGY | Age: 82
DRG: 637 | End: 2022-10-24
Payer: MEDICARE

## 2022-10-24 DIAGNOSIS — Z79.4 TYPE 2 DIABETES MELLITUS WITH HYPOGLYCEMIA AND COMA, WITH LONG-TERM CURRENT USE OF INSULIN (HCC): Chronic | ICD-10-CM

## 2022-10-24 DIAGNOSIS — T68.XXXA HYPOTHERMIA, INITIAL ENCOUNTER: ICD-10-CM

## 2022-10-24 DIAGNOSIS — E16.2 HYPOGLYCEMIA: Primary | ICD-10-CM

## 2022-10-24 DIAGNOSIS — E11.641 TYPE 2 DIABETES MELLITUS WITH HYPOGLYCEMIA AND COMA, WITH LONG-TERM CURRENT USE OF INSULIN (HCC): Chronic | ICD-10-CM

## 2022-10-24 LAB
ALBUMIN SERPL-MCNC: 3.4 GM/DL (ref 3.4–5)
ALBUMIN SERPL-MCNC: 3.5 GM/DL (ref 3.4–5)
ALP BLD-CCNC: 109 IU/L (ref 40–128)
ALP BLD-CCNC: 97 IU/L (ref 40–129)
ALT SERPL-CCNC: 12 U/L (ref 10–40)
ALT SERPL-CCNC: 9 U/L (ref 10–40)
ANION GAP SERPL CALCULATED.3IONS-SCNC: 12 MMOL/L (ref 4–16)
ANION GAP SERPL CALCULATED.3IONS-SCNC: 15 MMOL/L (ref 4–16)
AST SERPL-CCNC: 15 IU/L (ref 15–37)
AST SERPL-CCNC: 16 IU/L (ref 15–37)
BACTERIA: NEGATIVE /HPF
BASE EXCESS: 1 (ref 0–3.3)
BASOPHILS ABSOLUTE: 0 K/CU MM
BASOPHILS ABSOLUTE: 0 K/CU MM
BASOPHILS RELATIVE PERCENT: 0.4 % (ref 0–1)
BASOPHILS RELATIVE PERCENT: 0.4 % (ref 0–1)
BILIRUB SERPL-MCNC: 0.2 MG/DL (ref 0–1)
BILIRUB SERPL-MCNC: 0.2 MG/DL (ref 0–1)
BILIRUBIN URINE: NEGATIVE MG/DL
BLOOD, URINE: ABNORMAL
BUN BLDV-MCNC: 49 MG/DL (ref 6–23)
BUN BLDV-MCNC: 49 MG/DL (ref 6–23)
C-REACTIVE PROTEIN, HIGH SENSITIVITY: 8.7 MG/L
CALCIUM SERPL-MCNC: 9.1 MG/DL (ref 8.3–10.6)
CALCIUM SERPL-MCNC: 9.2 MG/DL (ref 8.3–10.6)
CARBON MONOXIDE, BLOOD: 1.8 % (ref 0–5)
CHLORIDE BLD-SCNC: 108 MMOL/L (ref 99–110)
CHLORIDE BLD-SCNC: 110 MMOL/L (ref 99–110)
CLARITY: CLEAR
CO2 CONTENT: 26.7 MMOL/L (ref 19–24)
CO2: 19 MMOL/L (ref 21–32)
CO2: 21 MMOL/L (ref 21–32)
COLOR: YELLOW
COMMENT: ABNORMAL
CREAT SERPL-MCNC: 2.1 MG/DL (ref 0.9–1.3)
CREAT SERPL-MCNC: 2.1 MG/DL (ref 0.9–1.3)
DIFFERENTIAL TYPE: ABNORMAL
DIFFERENTIAL TYPE: ABNORMAL
EOSINOPHILS ABSOLUTE: 1.1 K/CU MM
EOSINOPHILS ABSOLUTE: 1.2 K/CU MM
EOSINOPHILS RELATIVE PERCENT: 14.4 % (ref 0–3)
EOSINOPHILS RELATIVE PERCENT: 14.8 % (ref 0–3)
ERYTHROCYTE SEDIMENTATION RATE: 84 MM/HR (ref 0–20)
GFR SERPL CREATININE-BSD FRML MDRD: 30 ML/MIN/1.73M2
GFR SERPL CREATININE-BSD FRML MDRD: 30 ML/MIN/1.73M2
GLUCOSE BLD-MCNC: 154 MG/DL (ref 70–99)
GLUCOSE BLD-MCNC: 172 MG/DL (ref 70–99)
GLUCOSE BLD-MCNC: 24 MG/DL (ref 70–99)
GLUCOSE BLD-MCNC: 41 MG/DL (ref 70–99)
GLUCOSE BLD-MCNC: 48 MG/DL (ref 70–99)
GLUCOSE BLD-MCNC: 58 MG/DL (ref 70–99)
GLUCOSE BLD-MCNC: 64 MG/DL (ref 70–99)
GLUCOSE BLD-MCNC: 66 MG/DL (ref 70–99)
GLUCOSE BLD-MCNC: 70 MG/DL (ref 70–99)
GLUCOSE BLD-MCNC: 76 MG/DL (ref 70–99)
GLUCOSE BLD-MCNC: 83 MG/DL (ref 70–99)
GLUCOSE BLD-MCNC: 86 MG/DL (ref 70–99)
GLUCOSE BLD-MCNC: 93 MG/DL (ref 70–99)
GLUCOSE, URINE: NEGATIVE MG/DL
HCO3 ARTERIAL: 25.2 MMOL/L (ref 18–23)
HCT VFR BLD CALC: 25.8 % (ref 42–52)
HCT VFR BLD CALC: 26 % (ref 42–52)
HEMOGLOBIN: 7.8 GM/DL (ref 13.5–18)
HEMOGLOBIN: 7.9 GM/DL (ref 13.5–18)
IMMATURE NEUTROPHIL %: 0.1 % (ref 0–0.43)
IMMATURE NEUTROPHIL %: 0.4 % (ref 0–0.43)
INR BLD: 1.08 INDEX
KETONES, URINE: NEGATIVE MG/DL
LACTIC ACID, SEPSIS: 0.6 MMOL/L (ref 0.5–1.9)
LACTIC ACID, SEPSIS: 0.7 MMOL/L (ref 0.5–1.9)
LEUKOCYTE ESTERASE, URINE: ABNORMAL
LYMPHOCYTES ABSOLUTE: 2 K/CU MM
LYMPHOCYTES ABSOLUTE: 2 K/CU MM
LYMPHOCYTES RELATIVE PERCENT: 25.1 % (ref 24–44)
LYMPHOCYTES RELATIVE PERCENT: 26.6 % (ref 24–44)
MCH RBC QN AUTO: 30.5 PG (ref 27–31)
MCH RBC QN AUTO: 30.7 PG (ref 27–31)
MCHC RBC AUTO-ENTMCNC: 30 % (ref 32–36)
MCHC RBC AUTO-ENTMCNC: 30.6 % (ref 32–36)
MCV RBC AUTO: 100.4 FL (ref 78–100)
MCV RBC AUTO: 101.6 FL (ref 78–100)
METHEMOGLOBIN ARTERIAL: 1.3 %
MONOCYTES ABSOLUTE: 0.4 K/CU MM
MONOCYTES ABSOLUTE: 0.5 K/CU MM
MONOCYTES RELATIVE PERCENT: 5.7 % (ref 0–4)
MONOCYTES RELATIVE PERCENT: 5.9 % (ref 0–4)
MUCUS: ABNORMAL HPF
NITRITE URINE, QUANTITATIVE: NEGATIVE
NON SQUAM EPI CELLS: <1 /HPF
NUCLEATED RBC %: 0 %
NUCLEATED RBC %: 0 %
O2 SATURATION: 65.5 % (ref 96–97)
PCO2 ARTERIAL: 49 MMHG (ref 32–45)
PDW BLD-RTO: 16.3 % (ref 11.7–14.9)
PDW BLD-RTO: 16.4 % (ref 11.7–14.9)
PH BLOOD: 7.32 (ref 7.34–7.45)
PH, URINE: 7 (ref 5–8)
PLATELET # BLD: 227 K/CU MM (ref 140–440)
PLATELET # BLD: 235 K/CU MM (ref 140–440)
PMV BLD AUTO: 11.6 FL (ref 7.5–11.1)
PMV BLD AUTO: 11.6 FL (ref 7.5–11.1)
PO2 ARTERIAL: 37 MMHG (ref 75–100)
POTASSIUM SERPL-SCNC: 5.4 MMOL/L (ref 3.5–5.1)
POTASSIUM SERPL-SCNC: 5.4 MMOL/L (ref 3.5–5.1)
PROTEIN UA: 30 MG/DL
PROTHROMBIN TIME: 14 SECONDS (ref 11.7–14.5)
RBC # BLD: 2.56 M/CU MM (ref 4.6–6.2)
RBC # BLD: 2.57 M/CU MM (ref 4.6–6.2)
RBC URINE: 59 /HPF (ref 0–3)
SEGMENTED NEUTROPHILS ABSOLUTE COUNT: 3.9 K/CU MM
SEGMENTED NEUTROPHILS ABSOLUTE COUNT: 4.3 K/CU MM
SEGMENTED NEUTROPHILS RELATIVE PERCENT: 52.8 % (ref 36–66)
SEGMENTED NEUTROPHILS RELATIVE PERCENT: 53.4 % (ref 36–66)
SODIUM BLD-SCNC: 141 MMOL/L (ref 135–145)
SODIUM BLD-SCNC: 144 MMOL/L (ref 135–145)
SPECIFIC GRAVITY UA: 1.01 (ref 1–1.03)
SQUAMOUS EPITHELIAL: <1 /HPF
TOTAL IMMATURE NEUTOROPHIL: 0.01 K/CU MM
TOTAL IMMATURE NEUTOROPHIL: 0.03 K/CU MM
TOTAL NUCLEATED RBC: 0 K/CU MM
TOTAL NUCLEATED RBC: 0 K/CU MM
TOTAL PROTEIN: 6.6 GM/DL (ref 6.4–8.2)
TOTAL PROTEIN: 7 GM/DL (ref 6.4–8.2)
TRICHOMONAS: ABNORMAL /HPF
TROPONIN T: 0.04 NG/ML
UROBILINOGEN, URINE: 0.2 MG/DL (ref 0.2–1)
WBC # BLD: 7.4 K/CU MM (ref 4–10.5)
WBC # BLD: 8 K/CU MM (ref 4–10.5)
WBC UA: 36 /HPF (ref 0–2)

## 2022-10-24 PROCEDURE — 71260 CT THORAX DX C+: CPT

## 2022-10-24 PROCEDURE — 85610 PROTHROMBIN TIME: CPT

## 2022-10-24 PROCEDURE — 96360 HYDRATION IV INFUSION INIT: CPT

## 2022-10-24 PROCEDURE — 2580000003 HC RX 258: Performed by: EMERGENCY MEDICINE

## 2022-10-24 PROCEDURE — 80053 COMPREHEN METABOLIC PANEL: CPT

## 2022-10-24 PROCEDURE — 70450 CT HEAD/BRAIN W/O DYE: CPT

## 2022-10-24 PROCEDURE — 76937 US GUIDE VASCULAR ACCESS: CPT

## 2022-10-24 PROCEDURE — 36600 WITHDRAWAL OF ARTERIAL BLOOD: CPT

## 2022-10-24 PROCEDURE — 87150 DNA/RNA AMPLIFIED PROBE: CPT

## 2022-10-24 PROCEDURE — 83605 ASSAY OF LACTIC ACID: CPT

## 2022-10-24 PROCEDURE — 84145 PROCALCITONIN (PCT): CPT

## 2022-10-24 PROCEDURE — 6360000002 HC RX W HCPCS: Performed by: PHYSICIAN ASSISTANT

## 2022-10-24 PROCEDURE — 85025 COMPLETE CBC W/AUTO DIFF WBC: CPT

## 2022-10-24 PROCEDURE — 36415 COLL VENOUS BLD VENIPUNCTURE: CPT

## 2022-10-24 PROCEDURE — 96361 HYDRATE IV INFUSION ADD-ON: CPT

## 2022-10-24 PROCEDURE — 2580000003 HC RX 258: Performed by: INTERNAL MEDICINE

## 2022-10-24 PROCEDURE — 82962 GLUCOSE BLOOD TEST: CPT

## 2022-10-24 PROCEDURE — 84484 ASSAY OF TROPONIN QUANT: CPT

## 2022-10-24 PROCEDURE — 85652 RBC SED RATE AUTOMATED: CPT

## 2022-10-24 PROCEDURE — 87086 URINE CULTURE/COLONY COUNT: CPT

## 2022-10-24 PROCEDURE — 6360000002 HC RX W HCPCS: Performed by: INTERNAL MEDICINE

## 2022-10-24 PROCEDURE — 87186 SC STD MICRODIL/AGAR DIL: CPT

## 2022-10-24 PROCEDURE — 71045 X-RAY EXAM CHEST 1 VIEW: CPT

## 2022-10-24 PROCEDURE — 6360000004 HC RX CONTRAST MEDICATION: Performed by: STUDENT IN AN ORGANIZED HEALTH CARE EDUCATION/TRAINING PROGRAM

## 2022-10-24 PROCEDURE — 93005 ELECTROCARDIOGRAM TRACING: CPT | Performed by: EMERGENCY MEDICINE

## 2022-10-24 PROCEDURE — 99285 EMERGENCY DEPT VISIT HI MDM: CPT

## 2022-10-24 PROCEDURE — 86140 C-REACTIVE PROTEIN: CPT

## 2022-10-24 PROCEDURE — 87040 BLOOD CULTURE FOR BACTERIA: CPT

## 2022-10-24 PROCEDURE — 82803 BLOOD GASES ANY COMBINATION: CPT

## 2022-10-24 PROCEDURE — 2500000003 HC RX 250 WO HCPCS: Performed by: EMERGENCY MEDICINE

## 2022-10-24 PROCEDURE — 81003 URINALYSIS AUTO W/O SCOPE: CPT

## 2022-10-24 PROCEDURE — 2000000000 HC ICU R&B

## 2022-10-24 RX ORDER — DEXTROSE MONOHYDRATE 100 MG/ML
INJECTION, SOLUTION INTRAVENOUS CONTINUOUS PRN
Status: DISCONTINUED | OUTPATIENT
Start: 2022-10-24 | End: 2022-11-05 | Stop reason: HOSPADM

## 2022-10-24 RX ORDER — SODIUM POLYSTYRENE SULFONATE 15 G/60ML
15 SUSPENSION ORAL; RECTAL ONCE
COMMUNITY
End: 2022-10-24

## 2022-10-24 RX ORDER — SODIUM CHLORIDE 0.9 % (FLUSH) 0.9 %
5-40 SYRINGE (ML) INJECTION PRN
Status: DISCONTINUED | OUTPATIENT
Start: 2022-10-24 | End: 2022-11-05 | Stop reason: HOSPADM

## 2022-10-24 RX ORDER — FERROUS SULFATE 325(65) MG
325 TABLET ORAL
Status: DISCONTINUED | OUTPATIENT
Start: 2022-10-25 | End: 2022-11-05 | Stop reason: HOSPADM

## 2022-10-24 RX ORDER — ONDANSETRON 2 MG/ML
4 INJECTION INTRAMUSCULAR; INTRAVENOUS EVERY 6 HOURS PRN
Status: DISCONTINUED | OUTPATIENT
Start: 2022-10-24 | End: 2022-11-05 | Stop reason: HOSPADM

## 2022-10-24 RX ORDER — ACETAMINOPHEN 650 MG/1
650 SUPPOSITORY RECTAL EVERY 6 HOURS PRN
Status: DISCONTINUED | OUTPATIENT
Start: 2022-10-24 | End: 2022-11-05 | Stop reason: HOSPADM

## 2022-10-24 RX ORDER — METHENAMINE HIPPURATE 1000 MG/1
1 TABLET ORAL 2 TIMES DAILY WITH MEALS
Status: ON HOLD | COMMUNITY
End: 2022-11-05 | Stop reason: HOSPADM

## 2022-10-24 RX ORDER — SODIUM CHLORIDE 9 MG/ML
INJECTION, SOLUTION INTRAVENOUS PRN
Status: DISCONTINUED | OUTPATIENT
Start: 2022-10-24 | End: 2022-11-05 | Stop reason: HOSPADM

## 2022-10-24 RX ORDER — TORSEMIDE 20 MG/1
20 TABLET ORAL
Status: DISCONTINUED | OUTPATIENT
Start: 2022-10-25 | End: 2022-11-03

## 2022-10-24 RX ORDER — ASPIRIN 81 MG/1
81 TABLET, CHEWABLE ORAL NIGHTLY
Status: DISCONTINUED | OUTPATIENT
Start: 2022-10-25 | End: 2022-11-05 | Stop reason: HOSPADM

## 2022-10-24 RX ORDER — ENOXAPARIN SODIUM 100 MG/ML
40 INJECTION SUBCUTANEOUS DAILY
Status: DISCONTINUED | OUTPATIENT
Start: 2022-10-24 | End: 2022-11-03

## 2022-10-24 RX ORDER — ACETAMINOPHEN 325 MG/1
650 TABLET ORAL EVERY 6 HOURS PRN
Status: DISCONTINUED | OUTPATIENT
Start: 2022-10-24 | End: 2022-11-05 | Stop reason: HOSPADM

## 2022-10-24 RX ORDER — ONDANSETRON 4 MG/1
4 TABLET, ORALLY DISINTEGRATING ORAL EVERY 8 HOURS PRN
Status: DISCONTINUED | OUTPATIENT
Start: 2022-10-24 | End: 2022-11-05 | Stop reason: HOSPADM

## 2022-10-24 RX ORDER — DEXTROSE MONOHYDRATE 25 G/50ML
25 INJECTION, SOLUTION INTRAVENOUS ONCE
Status: DISCONTINUED | OUTPATIENT
Start: 2022-10-24 | End: 2022-10-24 | Stop reason: CLARIF

## 2022-10-24 RX ORDER — SODIUM CHLORIDE 0.9 % (FLUSH) 0.9 %
5-40 SYRINGE (ML) INJECTION EVERY 12 HOURS SCHEDULED
Status: DISCONTINUED | OUTPATIENT
Start: 2022-10-24 | End: 2022-11-05 | Stop reason: HOSPADM

## 2022-10-24 RX ORDER — POLYETHYLENE GLYCOL 3350 17 G/17G
17 POWDER, FOR SOLUTION ORAL DAILY PRN
Status: DISCONTINUED | OUTPATIENT
Start: 2022-10-24 | End: 2022-11-05 | Stop reason: HOSPADM

## 2022-10-24 RX ORDER — LEVOTHYROXINE SODIUM 0.07 MG/1
75 TABLET ORAL DAILY
Status: DISCONTINUED | OUTPATIENT
Start: 2022-10-25 | End: 2022-11-05 | Stop reason: HOSPADM

## 2022-10-24 RX ORDER — DEXTROSE MONOHYDRATE 25 G/50ML
25 INJECTION, SOLUTION INTRAVENOUS
Status: DISCONTINUED | OUTPATIENT
Start: 2022-10-24 | End: 2022-10-24 | Stop reason: CLARIF

## 2022-10-24 RX ORDER — CARVEDILOL 6.25 MG/1
6.25 TABLET ORAL 2 TIMES DAILY WITH MEALS
Status: DISCONTINUED | OUTPATIENT
Start: 2022-10-25 | End: 2022-11-05 | Stop reason: HOSPADM

## 2022-10-24 RX ORDER — CARVEDILOL 12.5 MG/1
12.5 TABLET ORAL 2 TIMES DAILY WITH MEALS
Status: ON HOLD | COMMUNITY
End: 2022-11-05 | Stop reason: SDUPTHER

## 2022-10-24 RX ORDER — DEXTROSE MONOHYDRATE 100 MG/ML
INJECTION, SOLUTION INTRAVENOUS CONTINUOUS
Status: DISCONTINUED | OUTPATIENT
Start: 2022-10-24 | End: 2022-10-26

## 2022-10-24 RX ADMIN — DEXTROSE MONOHYDRATE: 100 INJECTION, SOLUTION INTRAVENOUS at 18:07

## 2022-10-24 RX ADMIN — DEXTROSE MONOHYDRATE 125 ML: 10 INJECTION, SOLUTION INTRAVENOUS at 20:57

## 2022-10-24 RX ADMIN — IOPAMIDOL 80 ML: 755 INJECTION, SOLUTION INTRAVENOUS at 17:52

## 2022-10-24 RX ADMIN — GLUCAGON HYDROCHLORIDE 1 MG: KIT at 17:44

## 2022-10-24 RX ADMIN — DEXTROSE MONOHYDRATE: 100 INJECTION, SOLUTION INTRAVENOUS at 11:46

## 2022-10-24 RX ADMIN — DEXTROSE MONOHYDRATE: 100 INJECTION, SOLUTION INTRAVENOUS at 16:02

## 2022-10-24 RX ADMIN — ENOXAPARIN SODIUM 40 MG: 40 INJECTION SUBCUTANEOUS at 18:35

## 2022-10-24 RX ADMIN — MEROPENEM 1000 MG: 1 INJECTION, POWDER, FOR SOLUTION INTRAVENOUS at 21:00

## 2022-10-24 ASSESSMENT — PAIN - FUNCTIONAL ASSESSMENT: PAIN_FUNCTIONAL_ASSESSMENT: NONE - DENIES PAIN

## 2022-10-24 NOTE — ED TRIAGE NOTES
Patient to the ED from the [facility] via EMS. Patient was found to have altered mental status this morning with a BG of 50 per the nursing home. Nursing staff gave patient glucagon. When EMS arrived patient BG was 34 per EMS. Ems started D10 and got BG to 84 PTA. Patient BG 83 upon arrival. Patient is alert and oriented x4 upon arrival. VS stable.

## 2022-10-24 NOTE — ED PROVIDER NOTES
Emergency Department Encounter    Patient: Claudeen Bare  MRN: [id number]  : 1940  Date of Evaluation: 10/24/2022  ED Provider:  Celio Valdez MD    Triage Chief Complaint:   Hypoglycemia (39 per ems upon arrival. Glucagon given at nursing home and D10 given per medics)    Quechan:  Claudeen Bare is a 78 y.o. male that presents with concern for unresponsive, found to be hypoglycemic. On my evaluation of him he has some mild slurred speech so we did check his blood sugar again and it has dropped again, so D10 drip is ordered. Per nursing home they had checked his blood sugar and it was in the 50s and so they gave him glucagon, when EMS arrived it was 39. They gave D10. He did improve. It dropped again here into the 40s, we gave D10 and now improving. He denies any complaints, he does not recall what happened this morning. No vomiting or diarrhea. He had been on a PICC line with antibiotics for a UTI. . Was on ertapenem, completed course 3 days ago. He denies complaints to me. No headache or focal weakness. No abdominal pain or chest pain. No shortness of breath or cough or fevers. ROS - see HPI, below listed is current ROS at time of my eval:  10 systems reviewed and negative except as above.      Past Medical History:   Diagnosis Date    Acute urinary tract infection 3/15/2012    Ataxia 2010    Diabetes mellitus ([facility] [address]     type 2, controlled    Fusion of spine of cervical region 10/2012    Gait disturbance     History of prostate cancer     adenocarcinoma    History of tobacco use     Hyperlipidemia     Osteoarthritis      Past Surgical History:   Procedure Laterality Date    BLADDER SURGERY      BLADDER SURGERY Left 3/17/2022    CYSTOSCOPY LEFT STENT EXCHANGE performed by Estefanía Simmons MD at [address] [facility] Left 2022    LEFT CYSTOSCOPY URETERAL STENT EXCHANGE AND [address] performed by Oneil Finney MD at [facility] OR    OTHER SURGICAL HISTORY  3/28/13    Cysto with removal of artificial sphinter and placement of suprapubic catheter.     PROSTATE SURGERY      PROSTATECTOMY      infection     Family History   Problem Relation Age of Onset    Cancer Mother     Diabetes Father     Heart Disease Father     High Blood Pressure Father     Diabetes Sister     High Blood Pressure Sister     Cancer Brother         prostate, breast    Diabetes Brother     Cancer Maternal Uncle     Diabetes Maternal Grandmother     Stroke Maternal Grandfather      Social History     Socioeconomic History    Marital status:      Spouse name: Not on file    Number of children: 3    Years of education: Not on file    Highest education level: Not on file   Occupational History    Not on file   Tobacco Use    Smoking status: Former     Packs/day: 0.50     Types: Cigarettes     Quit date: 1976     Years since quittin.3    Smokeless tobacco: Never   Vaping Use    Vaping Use: Not on file   Substance and Sexual Activity    Alcohol use: No     Comment: Quit in     Drug use: No    Sexual activity: Not on file   Other Topics Concern    Not on file   Social History Narrative    Not on file     Social Determinants of Health     Financial Resource Strain: Not on file   Food Insecurity: Not on file   Transportation Needs: Not on file   Physical Activity: Not on file   Stress: Not on file   Social Connections: Not on file   Intimate Partner Violence: Not on file   Housing Stability: Not on file     Current Facility-Administered Medications   Medication Dose Route Frequency Provider Last Rate Last Admin    dextrose 10 % infusion   IntraVENous Continuous Rafi Magana  mL/hr at 10/24/22 1146 New Bag at 10/24/22 1146     Current Outpatient Medications   Medication Sig Dispense Refill    carvedilol (COREG) 12.5 MG tablet Take 12.5 mg by mouth 2 times daily (with meals)      menthol-cetylpyridinium (CEPACOL) 3 MG lozenge Take 1 lozenge by mouth as needed for Sore Throat      methenamine (HIPREX) 1 g tablet Take 1 g by mouth 2 times daily (with meals)      aluminum & magnesium hydroxide-simethicone (MAALOX) 200-200-20 MG/5ML SUSP suspension Take 30 mLs by mouth every 6 hours as needed for Indigestion 300 mL 3    ferrous sulfate (IRON 325) 325 (65 Fe) MG tablet Take 1 tablet by mouth 2 times daily (with meals) 30 tablet 3    atorvastatin (LIPITOR) 80 MG tablet Take 80 mg by mouth nightly      lisinopril (PRINIVIL;ZESTRIL) 5 MG tablet take 1 tablet by mouth every morning for high blood pressure      pregabalin (LYRICA) 50 MG capsule take 1 capsule by mouth twice a day for NERVE PAIN      torsemide (DEMADEX) 20 MG tablet Take 1 tablet by mouth in the morning.  (Patient taking differently: Take 20 mg by mouth three times a week Mon/Wed/Fri) 90 tablet 5    dextromethorphan-guaiFENesin (ROBITUSSIN-DM)  MG/5ML syrup Take 10 mLs by mouth every 4 hours as needed for Cough      Glucagon, rDNA, (GLUCAGON EMERGENCY) 1 MG KIT Inject as directed as needed      loperamide (IMODIUM A-D) 2 MG tablet Take 2 mg by mouth 4 times daily as needed for Diarrhea      insulin glargine (LANTUS) 100 UNIT/ML injection vial Inject 22 Units into the skin every morning      Melatonin 10 MG TABS Take 1 tablet by mouth at bedtime      omeprazole (PRILOSEC) 20 MG delayed release capsule Take 40 mg by mouth daily      levothyroxine (SYNTHROID) 75 MCG tablet Take 75 mcg by mouth Daily      NONFORMULARY Liquid Protein bid      aspirin 81 MG chewable tablet Take 1 tablet by mouth daily (Patient taking differently: Take 81 mg by mouth nightly) 30 tablet 3    amLODIPine (NORVASC) 5 MG tablet Take 5 mg by mouth daily      polyethylene glycol (GLYCOLAX) 17 g packet Take 17 g by mouth 2 times daily And PRN      ipratropium-albuterol (DUONEB) 0.5-2.5 (3) MG/3ML SOLN nebulizer solution Inhale 1 vial into the lungs 2 times daily      lidocaine (LIDODERM) 5 % Place 1 patch onto the skin daily Indications: L flank 12 hours on, 12 hours off.      oxybutynin (DITROPAN-XL) 10 MG extended release tablet Take 10 mg by mouth daily      magnesium hydroxide (MILK OF MAGNESIA) 400 MG/5ML suspension Take 30 mLs by mouth daily as needed for Constipation      bisacodyl (DULCOLAX) 10 MG suppository Place 10 mg rectally daily      glucose (GLUTOSE) 40 % GEL Take 15 g by mouth as needed      allopurinol (ZYLOPRIM) 100 MG tablet Take 100 mg by mouth daily      acetaminophen (TYLENOL) 325 MG tablet Take 2 tablets by mouth every 6 hours as needed for Pain 120 tablet 3    docusate sodium (COLACE, DULCOLAX) 100 MG CAPS Take 100 mg by mouth 2 times daily as needed for Constipation. 20 capsule 0     No Known Allergies    Nursing Notes Reviewed    Physical Exam:  Triage VS:    ED Triage Vitals [10/24/22 1104]   Enc Vitals Group      /69      Heart Rate 59      Resp 15      Temp       Temp src       SpO2 100 %      Weight       Height       Head Circumference       Peak Flow       Pain Score       Pain Loc       Pain Edu? Excl. in [address]? My pulse ox interpretation is - normal    General appearance:  No acute distress. Skin:  Warm. Dry. Eye:  Extraocular movements intact. Ears, nose, mouth and throat:  Oral mucosa moist   Neck:  Trachea midline. Extremity:  No swelling. Normal ROM     Heart:  Regular rate and rhythm, normal S1 & S2, no extra heart sounds. Perfusion:  intact  Respiratory:  Lungs clear to auscultation bilaterally. Respirations nonlabored. Abdominal:  NSoft. Nontender. Non distended. Neurological:  Alert and oriented, mild slurred speech, moves all limbs to commands, sensation intact light touch.   No facial droop          Psychiatric:  Appropriate    I have reviewed and interpreted all of the currently available lab results from this visit (if applicable):  Results for orders placed or performed during the hospital encounter of 10/24/22   CBC with Auto Differential   Result Value Ref Range    WBC 7.4 4.0 - 10.5 K/CU MM    RBC 2.57 (L) 4.6 - 6.2 M/CU MM    Hemoglobin 7.9 (L) 13.5 - 18.0 GM/DL    Hematocrit 25.8 (L) 42 - 52 %    .4 (H) 78 - 100 FL    MCH 30.7 27 - 31 PG    MCHC 30.6 (L) 32.0 - 36.0 %    RDW 16.4 (H) 11.7 - 14.9 %    Platelets 795 503 - 607 K/CU MM    MPV 11.6 (H) 7.5 - 11.1 FL    Differential Type AUTOMATED DIFFERENTIAL     Segs Relative 52.8 36 - 66 %    Lymphocytes % 26.6 24 - 44 %    Monocytes % 5.7 (H) 0 - 4 %    Eosinophils % 14.4 (H) 0 - 3 %    Basophils % 0.4 0 - 1 %    Segs Absolute 3.9 K/CU MM    Lymphocytes Absolute 2.0 K/CU MM    Monocytes Absolute 0.4 K/CU MM    Eosinophils Absolute 1.1 K/CU MM    Basophils Absolute 0.0 K/CU MM    Nucleated RBC % 0.0 %    Total Nucleated RBC 0.0 K/CU MM    Total Immature Neutrophil 0.01 K/CU MM    Immature Neutrophil % 0.1 0 - 0.43 %   Comprehensive Metabolic Panel w/ Reflex to MG   Result Value Ref Range    Sodium 141 135 - 145 MMOL/L    Potassium 5.4 (H) 3.5 - 5.1 MMOL/L    Chloride 108 99 - 110 mMol/L    CO2 21 21 - 32 MMOL/L    BUN 49 (H) 6 - 23 MG/DL    Creatinine 2.1 (H) 0.9 - 1.3 MG/DL    Est, Glom Filt Rate 30 (L) >60 mL/min/1.73m2    Glucose 66 (L) 70 - 99 MG/DL    Calcium 9.1 8.3 - 10.6 MG/DL    Albumin 3.4 3.4 - 5.0 GM/DL    Total Protein 7.0 6.4 - 8.2 GM/DL    Total Bilirubin 0.2 0.0 - 1.0 MG/DL    ALT 12 10 - 40 U/L    AST 16 15 - 37 IU/L    Alkaline Phosphatase 97 40 - 129 IU/L    Anion Gap 12 4 - 16   Troponin   Result Value Ref Range    Troponin T 0.044 (H) <0.01 NG/ML   Protime-INR   Result Value Ref Range    Protime 14.0 11.7 - 14.5 SECONDS    INR 1.08 INDEX   Lactate, Sepsis   Result Value Ref Range    Lactic Acid, Sepsis 0.7 0.5 - 1.9 mMOL/L   Lactate, Sepsis   Result Value Ref Range    Lactic Acid, Sepsis 0.6 0.5 - 1.9 mMOL/L   Urinalysis with Reflex to Culture    Specimen: Urine   Result Value Ref Range    Color, UA YELLOW YELLOW    Clarity, UA CLEAR CLEAR    Glucose, Urine NEGATIVE NEGATIVE MG/DL Bilirubin Urine NEGATIVE NEGATIVE MG/DL    Ketones, Urine NEGATIVE NEGATIVE MG/DL    Specific Gravity, UA 1.015 1.001 - 1.035    Blood, Urine MODERATE (A) NEGATIVE    pH, Urine 7.0 5.0 - 8.0    Protein, UA 30 (A) NEGATIVE MG/DL    Urobilinogen, Urine 0.2 0.2 - 1.0 MG/DL    Nitrite Urine, Quantitative NEGATIVE NEGATIVE    Leukocyte Esterase, Urine LARGE (A) NEGATIVE    RBC, UA 59 (H) 0 - 3 /HPF    WBC, UA 36 (H) 0 - 2 /HPF    Bacteria, UA NEGATIVE NEGATIVE /HPF    Squam Epithel, UA <1 /HPF    Mucus, UA RARE (A) NEGATIVE HPF    Trichomonas, UA NONE SEEN NONE SEEN /HPF    non squam epi cells <1 /HPF   POCT Glucose   Result Value Ref Range    POC Glucose 83 70 - 99 MG/DL   POCT Glucose   Result Value Ref Range    POC Glucose 41 (L) 70 - 99 MG/DL   POCT Glucose   Result Value Ref Range    POC Glucose 76 70 - 99 MG/DL   POCT Glucose   Result Value Ref Range    POC Glucose 86 70 - 99 MG/DL   POCT Glucose   Result Value Ref Range    POC Glucose 93 70 - 99 MG/DL   EKG 12 Lead   Result Value Ref Range    Ventricular Rate 56 BPM    Atrial Rate 56 BPM    P-R Interval 202 ms    QRS Duration 84 ms    Q-T Interval 460 ms    QTc Calculation (Bazett) 443 ms    P Axis 7 degrees    R Axis -5 degrees    T Axis 27 degrees    Diagnosis       Sinus bradycardia  Otherwise normal ECG  When compared with ECG of 03-SEP-2022 10:11,  Vent. rate has decreased BY  27 BPM        Radiographs (if obtained):  Radiologist's Report Reviewed:  No results found. EKG (if obtained): (All EKG's are interpreted by myself in the absence of a cardiologist)  Sinus bradycardia with a rate of 56 bpm, a lot of baseline artifact. No ST elevation. No previous compare      MDM:  55-year-old male with history as above presents with concern for unresponsive, altered mental status. Had hypoglycemia at the nursing home. Speech was slurred on my evaluation and so rechecked sugar-had dropped to the 40s again. D10 ordered.   We will get labs, CT head and chest x-ray.  I did add blood cultures and lactic acid given that he was on recent antibiotics with a UTI but his heart rate is normal, no fevers, he feels well otherwise. 1225- notifed by nursing that he is hypothermic. Asked ED pharmacist to look at his antibiotics and med list and determine if need to broaden him. Blood work was just sent, he is a hard stick. Otherwise vitals are normal so does not meet sepsis criteria at this time. Pharmacist Bronson Schilling was able to tell me that He did complete his whole course of antibiotics through the PICC, was complete a couple of days ago. Patient's labs do appear to be relatively stable, and his creatinine is stable compared to previous, K of 5.4. CT head unremarkable. 1400- urine with blood and leuk esterase,  no nitrites. Awaiting micro. CXR clear. 1545- urine with no bacteria. Currently patient has no source of infection, hypoglycemia is improved with the D10 drip, we will continue this at this time. He has had the Ese hugger placed and temp has come up from 94 degrees to 95.5. Plan for admission. Discussed with hospitalist team    Total critical care time today provided was 35minutes. This excludes seperately billable procedure. Critical care time provided for hypoglycemia, slurred speech, hypothermia that required close evaluation and/or intervention with concern for patient decompensation. Clinical Impression:  1. Hypoglycemia    2. Hypothermia, initial encounter      Disposition referral (if applicable):  No follow-up provider specified.   Disposition medications (if applicable):  New Prescriptions    No medications on file     ED Provider Disposition Time  DISPOSITION Decision To Admit 10/24/2022 01:55:33 PM      Comment: Please note this report has been produced using speech recognition software and may contain errors related to that system including errors in grammar, punctuation, and spelling, as well as words and phrases that may be inappropriate. Efforts were made to edit the dictations. Lorelei Mercer MD  10/24/22 1547      No reported increase in his oral diabetic medication or lantus from nursing home documentation. He dropped again with end of the D10 drip to the 40s, arousable to pain, so I am giving an amp of d50 and starting another drip. Lorelei Mercer MD  10/24/22 1556        Patient's sugar and went back down to 40 and he is altered again, we are giving a shot of glucagon and increasing the C12-312hj/hr.      Lorelei Mercer MD  10/24/22 4016

## 2022-10-24 NOTE — PLAN OF CARE
Problem: Discharge Planning  Goal: Discharge to home or other facility with appropriate resources  10/24/2022 1922 by Aixa Ponce RN  Outcome: Progressing  10/24/2022 1922 by Aixa Ponce RN  Outcome: Progressing     Problem: Safety - Adult  Goal: Free from fall injury  Outcome: Progressing     Problem: Skin/Tissue Integrity  Goal: Absence of new skin breakdown  Description: 1. Monitor for areas of redness and/or skin breakdown  2. Assess vascular access sites hourly  3. Every 4-6 hours minimum:  Change oxygen saturation probe site  4. Every 4-6 hours:  If on nasal continuous positive airway pressure, respiratory therapy assess nares and determine need for appliance change or resting period.   Outcome: Progressing

## 2022-10-24 NOTE — PROGRESS NOTES
4 Eyes Skin Assessment     NAME:  Milena Calvo  YOB: 1940  MEDICAL RECORD NUMBER:  [id number]    The patient is being assess for  Admission    I agree that 2 RN's have performed a thorough Head to Toe Skin Assessment on the patient. ALL assessment sites listed below have been assessed. Areas assessed by both nurses:    Head, Face, Ears, Shoulders, Back, Chest, Arms, Elbows, Hands, Sacrum. Buttock, Coccyx, Ischium, and Legs. Feet and Heels        Does the Patient have a Wound?  Other healed scabs bilat knees       Judd Prevention initiated:  Yes   Wound Care Orders initiated:  No    Pressure Injury (Stage 3,4, Unstageable, DTI, NWPT, and Complex wounds) if present place referral/consult order under [de-identified] No    New and Established Ostomies if present place consult order under : No      Nurse 1 eSignature: Electronically signed by Oscar Arnold RN on 10/24/22 at 6:13 PM EDT    **SHARE this note so that the co-signing nurse is able to place an eSignature**    Nurse 2 eSignature: Electronically signed by Michelle Corey RN on 10/24/22 at 6:36 PM EDT

## 2022-10-24 NOTE — ED NOTES
ED TO INPATIENT SBAR HANDOFF    Patient Name: Herminia George   :  1940  78 y.o. MRN:  [id number]  Preferred Name  Abdon Metzger  ED Room #:  ED18/ED-18  Family/Caregiver Present yes   Restraints no   Sitter no   Sepsis Risk Score Sepsis Risk Score: 4.66    Situation  Code Status: Prior No additional code details. Allergies: Patient has no known allergies. Weight: No data found. Arrived from: nursing home  Chief Complaint:   Chief Complaint   Patient presents with    Hypoglycemia     39 per ems upon arrival. Glucagon given at nursing home and D10 given per [facility] Problem/Diagnosis:  Active Problems:    * No active hospital problems. *  Resolved Problems:    * No resolved hospital problems. *    Imaging:   CT HEAD WO CONTRAST   Final Result   No acute intracranial abnormality. XR CHEST PORTABLE   Final Result   1. No active pulmonary disease.            Abnormal labs:   Abnormal Labs Reviewed   CBC WITH AUTO DIFFERENTIAL - Abnormal; Notable for the following components:       Result Value    RBC 2.57 (*)     Hemoglobin 7.9 (*)     Hematocrit 25.8 (*)     .4 (*)     MCHC 30.6 (*)     RDW 16.4 (*)     MPV 11.6 (*)     Monocytes % 5.7 (*)     Eosinophils % 14.4 (*)     All other components within normal limits   COMPREHENSIVE METABOLIC PANEL W/ REFLEX TO MG FOR LOW K - Abnormal; Notable for the following components:    Potassium 5.4 (*)     BUN 49 (*)     Creatinine 2.1 (*)     Est, Glom Filt Rate 30 (*)     Glucose 66 (*)     All other components within normal limits   TROPONIN - Abnormal; Notable for the following components:    Troponin T 0.044 (*)     All other components within normal limits   URINALYSIS WITH REFLEX TO CULTURE - Abnormal; Notable for the following components:    Blood, Urine MODERATE (*)     Protein, UA 30 (*)     Leukocyte Esterase, Urine LARGE (*)     All other components within normal limits   POCT GLUCOSE - Abnormal; Notable for the following components:    POC Glucose 41 (*)     All other components within normal limits     Critical values: yes     Abnormal Assessment Findings: BG 34 upon EMS arrival. 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    Gait disturbance 2011    History of prostate cancer 1996    adenocarcinoma    History of tobacco use 1959    Hyperlipidemia 2011    Osteoarthritis 2011       Assessment    Vitals/MEWS:    Level of Consciousness: Alert (0)   Vitals:    10/24/22 1104 10/24/22 1252 10/24/22 1300 10/24/22 1302   BP: 123/69  129/67    Pulse: 59   58   Resp: 15  14 11   Temp:  (!) 94.4 °F (34.7 °C)     TempSrc:  Rectal     SpO2: 100%        FiO2 (%): N/A  O2 Flow Rate:      Cardiac Rhythm:    Pain Assessment: 0/10 [] Verbal [] Diana [place] Scale  Pain Scale: Pain Assessment  Pain Assessment: None - Denies Pain  Last documented pain score (0-10 scale)    Last documented pain medication administered: N/A  Mental Status: oriented, alert, coherent, logical, thought processes intact, and able to concentrate and follow conversation  C-SSRS: Risk of Suicide: No Risk  Bedside swallow:    Jay Coma Scale (GCS): [place] Coma Scale  Eye Opening: Spontaneous  Best Verbal Response: Oriented  Best Motor Response: Obeys commands  [place] Coma Scale Score: 15  Active LDA's:    PO Status: Nothing by Mouth  Pertinent or High Risk Medications/Drips: no   If Yes, please provide details: N/A  Pending Blood Product Administration: no     You may also review the ED PT Care Timeline found under the Summary Nursing Index tab. Recommendation    Pending orders N/A  Plan for Discharge (if known):    Additional Comments: N/A   If any further questions, please call Sending RN at [phone]    Electronically signed by: Electronically signed by Erin Saint, RN on 10/24/2022 at 2:04 PM Lorena Pablo RN  10/24/22 4632

## 2022-10-24 NOTE — CONSULTS
Labs drawn/sent via US-guided venipuncture per RN request. #4Fr Bard PowerMidLine in LUE Brachial Vein assessed for patency, with tip lying within vessel lumen /s abnormalities visualized on 20ml NS flush injection; sterile dressing change performed with WinGuard Securing Device & BioPatch. Pt tolerated well & no other c/o or needs noted or reported.

## 2022-10-24 NOTE — H&P
V2.0  History and Physical      Name:  Kimberly Das /Age/Sex: 1940  (78 y.o. male)   MRN & CSN:  [id number] & [id number] Encounter Date/Time: 10/24/2022 3:42 PM EDT   Location:  ED18/ED-18 PCP: Cristina Easton MD       Hospital Day: 1    Assessment and Plan:   Kimberly Das is a 78 y.o. male with a past medical history of diabetes, HLD, tobacco use who presents from ECF with altered mental status, hypoglycemia. Acute severe and refractory hypoglycemia, unknown etiology  Patient receives Lantus 22 units daily  -Found unresponsive at nursing home, initial blood glucose in the 50s, given glucagon, repeat for EMS was 35s  -Started on D10 infusion in ED, cont    Acute metabolic encephalopathy S/T severe and refractory hypoglycemia  - CT head no acute process  CT chest, abdomen, pelvis ordered to look for any source of sepsis, possible cause of severe hypoglycemia    Hypothermia   - UA with large LE, 36 WBC, negative bacteria  - CXR no acute process   - no leukocytosis, LA WNL, blood culture sent    Elevated troponin  - trop T 0.044, chronically elevated in setting of CKD  - EKG with sinus bradycardia, no acute ST T wave changes  Unable to answer if he has a chest pain    Chronic anemia   - Hgb 7.9, near baseline     CKDIII   - Cr 2.0, near baseline   - avoid nephrotoxins     Recent admission with complicated UTI, septic shock secondary to pyelonephritis, positive for ESBL bacteremia and candidemia-in   CT abdomen/pelvis showed left ureteral stent in place with mild bilateral acute urinary tract dilation and the new right perinephric and periureteral stranding  Per son at the bedside, his suprapubic catheter gets exchanged 2 weeks, stent was exchanged 1 month ago  Just finished ertapenem for UTI 3 days ago      This patient was seen and examined in conjunction with . She was agreeable with the plan and management as dictated above.      Disposition:   Current Living situation: ECF  Expected Disposition: ECF  Estimated D/C: In 4 days    Diet Diet NPO   DVT Prophylaxis [x] Lovenox, []  Heparin, [] SCDs, [] Ambulation,  [] Eliquis, [] Xarelto   Code Status Prior   Surrogate Decision Maker/ POA son     History from:     patient, family member - Son mauro michel      History of Present Illness:     Chief Complaint:   Herminia George is a 78 y.o. male who presents with altered mental status. Patient is a nursing home resident, was found unresponsive by [facility] staff. Blood sugar 24 at [facility], given glucagon and D10. He has mild slurred speech, recheck of the blood sugar is in 40s. He received glucagon again the sugar dropped to 39 on EMS arrival.  He got couple doses of D10 with no improvement in hypoglycemia. Per son, no prior illness, fever, symptoms of UTI,. He recently finished antibiotic coverage for UTI-on ertapenem until 3 days ago. No URI symptoms, fever, chills, nausea/vomiting/abdominal pain, chest pain was noticed or complaint by the patient prior to this event. Review of Systems:   Ten point ROS reviewed negative, unless as noted above  Will obtain complete ROS as patient is confused and unable to answer questions  Objective:   No intake or output data in the 24 hours ending 10/24/22 1542   Vitals:   Vitals:    10/24/22 1500 10/24/22 1515 10/24/22 1519 10/24/22 1530   BP: (!) 112/55   (!) 100/52   Pulse: 55 59  56   Resp: (!) 9 12  12   Temp:   (!) 95.5 °F (35.3 °C) (!) 95.5 °F (35.3 °C)   TempSrc:   Rectal Rectal   SpO2:  100%  98%       Medications Prior to Admission     Prior to Admission medications    Medication Sig Start Date End Date Taking?  Authorizing Provider   carvedilol (COREG) 12.5 MG tablet Take 12.5 mg by mouth 2 times daily (with meals)   Yes Historical Provider, MD   menthol-cetylpyridinium (CEPACOL) 3 MG lozenge Take 1 lozenge by mouth as needed for Sore Throat   Yes Historical Provider, MD   methenamine (HIPREX) 1 g tablet Take 1 g by mouth 2 times daily (with meals)   Yes Historical Provider, MD   aluminum & magnesium hydroxide-simethicone (MAALOX) 200-200-20 MG/5ML SUSP suspension Take 30 mLs by mouth every 6 hours as needed for Indigestion 9/21/22 10/21/22  Silvia Ann MD   ferrous sulfate (IRON 325) 325 (65 Fe) MG tablet Take 1 tablet by mouth 2 times daily (with meals) 9/21/22   Silvia Ann MD   atorvastatin (LIPITOR) 80 MG tablet Take 80 mg by mouth nightly 7/25/22   Historical Provider, MD   lisinopril (PRINIVIL;ZESTRIL) 5 MG tablet take 1 tablet by mouth every morning for high blood pressure 7/25/22   Historical Provider, MD   pregabalin (LYRICA) 50 MG capsule take 1 capsule by mouth twice a day for NERVE PAIN 6/29/22   Historical Provider, MD   torsemide (DEMADEX) 20 MG tablet Take 1 tablet by mouth in the morning.   Patient taking differently: Take 20 mg by mouth three times a week Mon/Wed/Fri 8/9/22   Tex Andrews MD   dextromethorphan-guaiFENesin (ROBITUSSIN-DM)  MG/5ML syrup Take 10 mLs by mouth every 4 hours as needed for Cough    Historical Provider, MD   Glucagon, rDNA, (GLUCAGON EMERGENCY) 1 MG KIT Inject as directed as needed    Historical Provider, MD   loperamide (IMODIUM A-D) 2 MG tablet Take 2 mg by mouth 4 times daily as needed for Diarrhea    Historical Provider, MD   insulin glargine (LANTUS) 100 UNIT/ML injection vial Inject 22 Units into the skin every morning    Historical Provider, MD   Melatonin 10 MG TABS Take 1 tablet by mouth at bedtime    Historical Provider, MD   omeprazole (PRILOSEC) 20 MG delayed release capsule Take 40 mg by mouth daily    Historical Provider, MD   levothyroxine (SYNTHROID) 75 MCG tablet Take 75 mcg by mouth Daily    Historical Provider, MD   NONFORMULARY Liquid Protein bid    Historical Provider, MD   aspirin 81 MG chewable tablet Take 1 tablet by mouth daily  Patient taking differently: Take 81 mg by mouth nightly 4/22/22   Adolph Roach MD   amLODIPine (NORVASC) 5 MG tablet Take 5 mg by mouth daily    Historical Provider, MD   polyethylene glycol (GLYCOLAX) 17 g packet Take 17 g by mouth 2 times daily And PRN    Historical Provider, MD   ipratropium-albuterol (DUONEB) 0.5-2.5 (3) MG/3ML SOLN nebulizer solution Inhale 1 vial into the lungs 2 times daily    Historical Provider, MD   lidocaine (LIDODERM) 5 % Place 1 patch onto the skin daily Indications: L flank 12 hours on, 12 hours off. Historical Provider, MD   oxybutynin (DITROPAN-XL) 10 MG extended release tablet Take 10 mg by mouth daily    Historical Provider, MD   magnesium hydroxide (MILK OF MAGNESIA) 400 MG/5ML suspension Take 30 mLs by mouth daily as needed for Constipation    Historical Provider, MD   bisacodyl (DULCOLAX) 10 MG suppository Place 10 mg rectally daily    Historical Provider, MD   glucose (GLUTOSE) 40 % GEL Take 15 g by mouth as needed    Historical Provider, MD   allopurinol (ZYLOPRIM) 100 MG tablet Take 100 mg by mouth daily    Historical Provider, MD   acetaminophen (TYLENOL) 325 MG tablet Take 2 tablets by mouth every 6 hours as needed for Pain 1/6/22   [facility] Centerfrederick Wheeler,    docusate sodium (COLACE, DULCOLAX) 100 MG CAPS Take 100 mg by mouth 2 times daily as needed for Constipation. 12/14/14   Demetrius Key MD       Physical Exam:   GEN patient is drowsy, opens eyes to voice commands, appears stated age, not in respiratory distress  EYES Pupils are equally round. No scleral erythema, discharge, or conjunctivitis. HENT Mucous membranes are moist.  NECK Supple, no apparent thyromegaly or masses. RESP Clear to auscultation, no wheezes, rales or rhonchi. Respirations even and unlabored on RA. CARDIO/VASC   S1/S2 auscultated. Regular rate without appreciable murmurs, rubs, or gallops. Peripheral pulses equal bilaterally and palpable. No peripheral edema. GI Abdomen is soft without significant tenderness, masses, or guarding.  No costovertebral angle tenderness. Hopper catheter is not present. MSK No gross joint deformities.   SKIN Normal coloration, warm, dry. 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 Allergies: No Known Allergies  Fam HX:  family history includes Cancer in his brother, maternal uncle, and mother; Diabetes in his brother, father, maternal grandmother, and sister; Heart Disease in his father; High Blood Pressure in his father and sister; Stroke in his maternal grandfather.   Soc HX:   Social History     Socioeconomic History    Marital status:      Spouse name: None    Number of children: 3    Years of education: None    Highest education level: None   Tobacco Use    Smoking status: Former     Packs/day: 0.50     Types: Cigarettes     Quit date: 1976     Years since quittin.3    Smokeless tobacco: Never   Substance and Sexual Activity    Alcohol use: No     Comment: Quit in     Drug use: No       Medications:   Medications:    Infusions:    dextrose 100 mL/hr at 10/24/22 1146     PRN Meds:      Labs    CBC:   Recent Labs     10/24/22  0950 10/24/22  1220   WBC 8.0 7.4   HGB 7.8* 7.9*    235     BMP:    Recent Labs     10/24/22  0950 10/24/22  1220    141   K 5.4* 5.4*    108   CO2 19* 21   BUN 49* 49* CREATININE 2.1* 2.1*   GLUCOSE 24* 66*     Hepatic:   Recent Labs     10/24/22  0950 10/24/22  1220   AST 15 16   ALT 9* 12   BILITOT 0.2 0.2   ALKPHOS 109 97     Lipids: No results found for: CHOL, HDL, TRIG  Hemoglobin A1C:   Lab Results   Component Value Date/Time    LABA1C 7.8 08/29/2022 01:30 AM     TSH: No results found for: TSH  Troponin:   Lab Results   Component Value Date/Time    TROPONINT 0.044 10/24/2022 12:20 PM    TROPONINT 0.030 04/20/2022 07:30 AM    TROPONINT 0.040 04/18/2022 09:51 AM     Lactic Acid: No results for input(s): LACTA in the last 72 hours. BNP: No results for input(s): PROBNP in the last 72 hours.   UA:  Lab Results   Component Value Date/Time    NITRU NEGATIVE 10/24/2022 11:00 AM    NITRU NEGATIVE 03/23/2013 02:24 PM    COLORU YELLOW 10/24/2022 11:00 AM    WBCUA 36 10/24/2022 11:00 AM    RBCUA 59 10/24/2022 11:00 AM    MUCUS RARE 10/24/2022 11:00 AM    TRICHOMONAS NONE SEEN 10/24/2022 11:00 AM    YEAST FEW 04/06/2022 04:45 PM    BACTERIA NEGATIVE 10/24/2022 11:00 AM    CLARITYU CLEAR 10/24/2022 11:00 AM    SPECGRAV 1.015 10/24/2022 11:00 AM    LEUKOCYTESUR LARGE 10/24/2022 11:00 AM    UROBILINOGEN 0.2 10/24/2022 11:00 AM    BILIRUBINUR NEGATIVE 10/24/2022 11:00 AM    BLOODU MODERATE 10/24/2022 11:00 AM    GLUCOSEU 1,000 03/23/2013 02:24 PM    Wayna Brash NEGATIVE 10/24/2022 11:00 AM    AMORPHOUS RARE 11/16/2021 12:28 PM     Urine Cultures: No results found for: LABURIN  Blood Cultures: No results found for: BC  No results found for: BLOODCULT2  Organism:   Lab Results   Component Value Date/Time    ORG ENTBC 02/11/2018 04:35 AM       Imaging/Diagnostics Last 24 Hours   CT HEAD WO CONTRAST    Result Date: 10/24/2022  EXAMINATION: CT OF THE HEAD WITHOUT CONTRAST  10/24/2022 12:41 pm TECHNIQUE: CT of the head was performed without the administration of intravenous contrast. Automated exposure control, iterative reconstruction, and/or weight based adjustment of the mA/kV was utilized to reduce the radiation dose to as low as reasonably achievable. COMPARISON: 04/18/2022 HISTORY: ORDERING SYSTEM PROVIDED HISTORY: HEAD TRAUMA, CLOSED, MILD, GCS >= 13, NO RISK FACTORS, NEURO EXAM NORMAL TECHNOLOGIST PROVIDED HISTORY: Has a \"code stroke\" or \"stroke alert\" been called? ->No Reason for exam:->slurred speech Decision Support Exception - unselect if not a suspected or confirmed emergency medical condition->Emergency Medical Condition (MA) Reason for Exam: slurred speech, Head trauma, closed, mild, GCS >= 13, no risk factors, neuro exam normal FINDINGS: BRAIN/VENTRICLES: There is no acute intracranial hemorrhage, mass effect or midline shift. No abnormal extra-axial fluid collection. The gray-white differentiation is maintained without evidence of an acute infarct. There is no evidence of hydrocephalus. Mild chronic microvascular ischemic change. ORBITS: The visualized portion of the orbits demonstrate no acute abnormality. SINUSES: The visualized paranasal sinuses and mastoid air cells demonstrate no acute abnormality. SOFT TISSUES/SKULL:  No acute abnormality of the visualized skull or soft tissues. No acute intracranial abnormality. XR CHEST PORTABLE    Result Date: 10/24/2022  EXAMINATION: ONE XRAY VIEW OF THE CHEST 10/24/2022 11:41 am COMPARISON: 09/10/2022. HISTORY: ORDERING SYSTEM PROVIDED HISTORY: stroke symptoms TECHNOLOGIST PROVIDED HISTORY: Reason for exam:->stroke symptoms Reason for Exam: STROKE SYMPTOMS FINDINGS: The heart size is within normal limits. The pulmonary vasculature is also within normal limits. No acute infiltrates are seen. The costophrenic angles are sharp bilaterally. No pneumothoraces are noted. There has been removal of the right central venous line. 1. No active pulmonary disease. Total CC time 40 min:  Patient is critical due to acute metabolic encephalopathy  Care time does not include separately billable procedures.   During this time I reviewed patient's medical records, lab results, reviewed nursing notes, discussed with attending regarding plan of care updated family and discussed regarding goals of care.   Patient is full code      Sarah Montanez PA-C

## 2022-10-24 NOTE — ED NOTES
Medication History  [facility]    Patient Name: Katy Holt 1940     Medication history has been completed by: Harrison Nuñez CPhT    Source(s) of information: Medication list provided [facility]     Primary Care Physician: Todd Gonzalez MD     Pharmacy:     Allergies as of 10/24/2022    (No Known Allergies)        Prior to Admission medications    Medication Sig Start Date End Date Taking? Authorizing Provider   carvedilol (COREG) 12.5 MG tablet Take 12.5 mg by mouth 2 times daily (with meals)   Yes Historical Provider, MD   menthol-cetylpyridinium (CEPACOL) 3 MG lozenge Take 1 lozenge by mouth as needed for Sore Throat   Yes Historical Provider, MD   methenamine (HIPREX) 1 g tablet Take 1 g by mouth 2 times daily (with meals)   Yes Historical Provider, MD   aluminum & magnesium hydroxide-simethicone (MAALOX) 200-200-20 MG/5ML SUSP suspension Take 30 mLs by mouth every 6 hours as needed for Indigestion 9/21/22   Feroz Crowder MD   ferrous sulfate (IRON 325) 325 (65 Fe) MG tablet Take 1 tablet by mouth 2 times daily (with meals) 9/21/22   Feroz Crowder MD   atorvastatin (LIPITOR) 80 MG tablet Take 80 mg by mouth nightly 7/25/22   Historical Provider, MD   lisinopril (PRINIVIL;ZESTRIL) 5 MG tablet take 1 tablet by mouth every morning for high blood pressure 7/25/22   Historical Provider, MD   pregabalin (LYRICA) 50 MG capsule take 1 capsule by mouth twice a day for NERVE PAIN 6/29/22   Historical Provider, MD   torsemide (DEMADEX) 20 MG tablet Take 1 tablet by mouth in the morning.   Patient taking differently: Take 20 mg by mouth three times a week Mon/Wed/Fri 8/9/22   Arjun Morales MD   dextromethorphan-guaiFENesin (ROBITUSSIN-DM)  MG/5ML syrup Take 10 mLs by mouth every 4 hours as needed for Cough    Historical Provider, MD   Glucagon, rDNA, (GLUCAGON EMERGENCY) 1 MG KIT Inject as directed as needed    Historical Provider, MD   loperamide (IMODIUM A-D) 2 MG tablet Take 2 mg by mouth 4 times daily as needed for Diarrhea    Historical Provider, MD   insulin glargine (LANTUS) 100 UNIT/ML injection vial Inject 22 Units into the skin every morning    Historical Provider, MD   Melatonin 10 MG TABS Take 1 tablet by mouth at bedtime    Historical Provider, MD   omeprazole (PRILOSEC) 20 MG delayed release capsule Take 40 mg by mouth daily    Historical Provider, MD   levothyroxine (SYNTHROID) 75 MCG tablet Take 75 mcg by mouth Daily    Historical Provider, MD   NONFORMULARY Liquid Protein bid    Historical Provider, MD   aspirin 81 MG chewable tablet Take 1 tablet by mouth daily  Patient taking differently: Take 81 mg by mouth nightly 4/22/22   Amador Mccabe MD   amLODIPine (NORVASC) 5 MG tablet Take 5 mg by mouth daily    Historical Provider, MD   polyethylene glycol (GLYCOLAX) 17 g packet Take 17 g by mouth 2 times daily And PRN    Historical Provider, MD   ipratropium-albuterol (DUONEB) 0.5-2.5 (3) MG/3ML SOLN nebulizer solution Inhale 1 vial into the lungs 2 times daily    Historical Provider, MD   lidocaine (LIDODERM) 5 % Place 1 patch onto the skin daily Indications: L flank 12 hours on, 12 hours off.     Historical Provider, MD   oxybutynin (DITROPAN-XL) 10 MG extended release tablet Take 10 mg by mouth daily    Historical Provider, MD   magnesium hydroxide (MILK OF MAGNESIA) 400 MG/5ML suspension Take 30 mLs by mouth daily as needed for Constipation    Historical Provider, MD   bisacodyl (DULCOLAX) 10 MG suppository Place 10 mg rectally daily    Historical Provider, MD   glucose (GLUTOSE) 40 % GEL Take 15 g by mouth as needed    Historical Provider, MD   allopurinol (ZYLOPRIM) 100 MG tablet Take 100 mg by mouth daily    Historical Provider, MD   acetaminophen (TYLENOL) 325 MG tablet Take 2 tablets by mouth every 6 hours as needed for Pain 1/6/22   Otto Rush DO   docusate sodium (COLACE, DULCOLAX) 100 MG CAPS Take 100 mg by mouth 2 times daily as needed for Constipation. 12/14/14   Neeraj Ivory MD     Medications added or changed (ex. new medication, dosage change, interval change, formulation change):  Carvedilol dosage change from 6.25 mg to 12.5 mg BID  Amlodipine dosage change from 10 mg to 5 mg  Cepacol throat lozenge prn (added)  Lantus dosage changed from 30 units to 22 units     Medications removed from list (include reason, ex. noncompliance, medication cost, therapy complete etc.):   Kayexalate one time dose  Glimepiride not on med list per facility    Comments:   Medication list provided [facility] out to nursing facility for verification of torsemide dosage for 20 mg 3 times weekly Mon/Wed/Fri. Per nursing staff Beau Jose therapy complete 10/23/22.     To my knowledge the above medication history is accurate as of 10/24/2022 2:58 PM.   Bay Garcia CPhT   10/24/2022 2:58 PM

## 2022-10-25 LAB
ALBUMIN SERPL-MCNC: 3.4 GM/DL (ref 3.4–5)
ALP BLD-CCNC: 100 IU/L (ref 40–129)
ALT SERPL-CCNC: 8 U/L (ref 10–40)
ANION GAP SERPL CALCULATED.3IONS-SCNC: 10 MMOL/L (ref 4–16)
AST SERPL-CCNC: 16 IU/L (ref 15–37)
BILIRUB SERPL-MCNC: 0.2 MG/DL (ref 0–1)
BILIRUBIN DIRECT: 0.2 MG/DL (ref 0–0.3)
BILIRUBIN, INDIRECT: 0 MG/DL (ref 0–0.7)
BUN BLDV-MCNC: 48 MG/DL (ref 6–23)
CALCIUM SERPL-MCNC: 8.9 MG/DL (ref 8.3–10.6)
CHLORIDE BLD-SCNC: 106 MMOL/L (ref 99–110)
CO2: 23 MMOL/L (ref 21–32)
CREAT SERPL-MCNC: 2.1 MG/DL (ref 0.9–1.3)
EKG ATRIAL RATE: 56 BPM
EKG DIAGNOSIS: NORMAL
EKG P AXIS: 7 DEGREES
EKG P-R INTERVAL: 202 MS
EKG Q-T INTERVAL: 460 MS
EKG QRS DURATION: 84 MS
EKG QTC CALCULATION (BAZETT): 443 MS
EKG R AXIS: -5 DEGREES
EKG T AXIS: 27 DEGREES
EKG VENTRICULAR RATE: 56 BPM
GFR SERPL CREATININE-BSD FRML MDRD: 30 ML/MIN/1.73M2
GLUCOSE BLD-MCNC: 121 MG/DL (ref 70–99)
GLUCOSE BLD-MCNC: 122 MG/DL (ref 70–99)
GLUCOSE BLD-MCNC: 208 MG/DL (ref 70–99)
GLUCOSE BLD-MCNC: 33 MG/DL (ref 70–99)
GLUCOSE BLD-MCNC: 37 MG/DL (ref 70–99)
GLUCOSE BLD-MCNC: 67 MG/DL (ref 70–99)
GLUCOSE BLD-MCNC: 71 MG/DL (ref 70–99)
GLUCOSE BLD-MCNC: 78 MG/DL (ref 70–99)
GLUCOSE BLD-MCNC: 88 MG/DL (ref 70–99)
GLUCOSE BLD-MCNC: 89 MG/DL (ref 70–99)
HCT VFR BLD CALC: 26.2 % (ref 42–52)
HEMOGLOBIN: 8.2 GM/DL (ref 13.5–18)
MCH RBC QN AUTO: 30.3 PG (ref 27–31)
MCHC RBC AUTO-ENTMCNC: 31.3 % (ref 32–36)
MCV RBC AUTO: 96.7 FL (ref 78–100)
PDW BLD-RTO: 16.2 % (ref 11.7–14.9)
PLATELET # BLD: 222 K/CU MM (ref 140–440)
PMV BLD AUTO: 11.3 FL (ref 7.5–11.1)
POTASSIUM SERPL-SCNC: 5.2 MMOL/L (ref 3.5–5.1)
PROCALCITONIN: 0.21
RBC # BLD: 2.71 M/CU MM (ref 4.6–6.2)
SODIUM BLD-SCNC: 139 MMOL/L (ref 135–145)
TOTAL PROTEIN: 6.5 GM/DL (ref 6.4–8.2)
WBC # BLD: 7 K/CU MM (ref 4–10.5)

## 2022-10-25 PROCEDURE — 6370000000 HC RX 637 (ALT 250 FOR IP): Performed by: PHYSICIAN ASSISTANT

## 2022-10-25 PROCEDURE — 80053 COMPREHEN METABOLIC PANEL: CPT

## 2022-10-25 PROCEDURE — 82248 BILIRUBIN DIRECT: CPT

## 2022-10-25 PROCEDURE — 2580000003 HC RX 258: Performed by: PHYSICIAN ASSISTANT

## 2022-10-25 PROCEDURE — 94761 N-INVAS EAR/PLS OXIMETRY MLT: CPT

## 2022-10-25 PROCEDURE — 93010 ELECTROCARDIOGRAM REPORT: CPT | Performed by: INTERNAL MEDICINE

## 2022-10-25 PROCEDURE — 6360000002 HC RX W HCPCS: Performed by: INTERNAL MEDICINE

## 2022-10-25 PROCEDURE — 6360000002 HC RX W HCPCS: Performed by: NURSE PRACTITIONER

## 2022-10-25 PROCEDURE — 6360000002 HC RX W HCPCS: Performed by: PHYSICIAN ASSISTANT

## 2022-10-25 PROCEDURE — 2580000003 HC RX 258: Performed by: EMERGENCY MEDICINE

## 2022-10-25 PROCEDURE — 85027 COMPLETE CBC AUTOMATED: CPT

## 2022-10-25 PROCEDURE — 2580000003 HC RX 258: Performed by: INTERNAL MEDICINE

## 2022-10-25 PROCEDURE — 82962 GLUCOSE BLOOD TEST: CPT

## 2022-10-25 PROCEDURE — 2000000000 HC ICU R&B

## 2022-10-25 RX ORDER — PANTOPRAZOLE SODIUM 40 MG/1
40 TABLET, DELAYED RELEASE ORAL
Status: DISCONTINUED | OUTPATIENT
Start: 2022-10-26 | End: 2022-11-05 | Stop reason: HOSPADM

## 2022-10-25 RX ADMIN — ASPIRIN 81 MG CHEWABLE TABLET 81 MG: 81 TABLET CHEWABLE at 20:03

## 2022-10-25 RX ADMIN — MEROPENEM 1000 MG: 1 INJECTION, POWDER, FOR SOLUTION INTRAVENOUS at 20:06

## 2022-10-25 RX ADMIN — SODIUM CHLORIDE, PRESERVATIVE FREE 10 ML: 5 INJECTION INTRAVENOUS at 09:04

## 2022-10-25 RX ADMIN — MEROPENEM 1000 MG: 1 INJECTION, POWDER, FOR SOLUTION INTRAVENOUS at 09:02

## 2022-10-25 RX ADMIN — DEXTROSE MONOHYDRATE: 100 INJECTION, SOLUTION INTRAVENOUS at 13:08

## 2022-10-25 RX ADMIN — DEXTROSE MONOHYDRATE 250 ML: 10 INJECTION, SOLUTION INTRAVENOUS at 07:03

## 2022-10-25 RX ADMIN — DEXTROSE MONOHYDRATE 125 ML: 10 INJECTION, SOLUTION INTRAVENOUS at 16:17

## 2022-10-25 RX ADMIN — DEXTROSE MONOHYDRATE: 100 INJECTION, SOLUTION INTRAVENOUS at 23:20

## 2022-10-25 RX ADMIN — HYDROCORTISONE SODIUM SUCCINATE 50 MG: 100 INJECTION, POWDER, FOR SOLUTION INTRAMUSCULAR; INTRAVENOUS at 13:11

## 2022-10-25 RX ADMIN — HYDROCORTISONE SODIUM SUCCINATE 50 MG: 100 INJECTION, POWDER, FOR SOLUTION INTRAMUSCULAR; INTRAVENOUS at 20:03

## 2022-10-25 RX ADMIN — DEXTROSE MONOHYDRATE: 100 INJECTION, SOLUTION INTRAVENOUS at 02:42

## 2022-10-25 RX ADMIN — SODIUM CHLORIDE, PRESERVATIVE FREE 10 ML: 5 INJECTION INTRAVENOUS at 20:57

## 2022-10-25 RX ADMIN — ENOXAPARIN SODIUM 40 MG: 40 INJECTION SUBCUTANEOUS at 08:57

## 2022-10-25 ASSESSMENT — PAIN SCALES - GENERAL
PAINLEVEL_OUTOF10: 0

## 2022-10-25 NOTE — CONSULTS
[facility], [address]   Consult Note  [facility] 1 2 3 4 5  Date: 10/25/2022   Patient: Katy Holt   : 1940   DOA: 10/24/2022   MRN: [id number]   ROOM#: [id number]     Reason for Consult:  SPT, recent ESBL E. coli infection  Requesting Physician:  Dr. Emilee Figueroa  Collaborating Urologist on Call at time of admission:  [address]:  AMS, unresponsive    History Obtained From:  patient, electronic medical record    HISTORY OF PRESENT ILLNESS:                The patient is a 78 y.o. male with significant past medical history of prostate cancer s/p RPP (), type 2 diabetes mellitus, hyperlipidemia, chronic left hydronephrosis managed by chronic indwelling ureteral stent, chronic urine retention managed by suprapubic catheter, recent ESBL E. coli bacteremia/sepsis who presented after he was found unresponsive at his nursing facility. Initial BG was 39 per EMS receiving D10. No leukocytosis, Hgb 7.8 (recent anemia ongoing but stable), creatinine 2.1 near recent baseline, UA with moderate blood and large leuks but negative nitrites and no bacteria seen. Urine culture is pending. Blood cultures were sent and 1/4 bottles showing gram-positive cocci. CT chest/abdomen/pelvis was performed which showed left ureteral stent in place, mild left renal atrophy, perinephric stranding on the left with mild decreased enhancement of the cortex compared to the right but no hydronephrosis, no stones. Patient has chronic left hydronephrosis which is managed by ureteral stent exchanges every 3 months, however his recent stent exchange earlier this month was canceled due to anemia. Currently, patient is stable but minimally interactive and somnolent. Per chart, no nausea or vomiting, no fever or chills. Procedure note: The patient was prepped and draped in the usual sterile fashion. The balloon was completely deflated and the old 12 French Hopper catheter was removed.   A [address] Hopper catheter was sterilely inserted and the balloon instilled with 10 cc normal saline. Clear light yellow urine seen in the tubing. The abdomen was cleaned and dried and the tubing was secured to the patient's leg. Patient tolerated the procedure well no complications occurred. 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    Gait disturbance 2011    History of prostate cancer 1996    adenocarcinoma    History of tobacco use 1959    Hyperlipidemia 2011    Osteoarthritis 2011     Past Surgical History:        Procedure Laterality Date    BLADDER SURGERY      BLADDER SURGERY Left 3/17/2022    CYSTOSCOPY LEFT STENT EXCHANGE performed by James Carrera MD at [facility] Left 9/7/2022    LEFT CYSTOSCOPY URETERAL STENT EXCHANGE AND [address] performed by Amanda Chery MD at [facility]  3/28/13    Cysto with removal of artificial sphinter and placement of suprapubic catheter.     PROSTATE SURGERY      PROSTATECTOMY  1996    infection     Current Medications:   Current Facility-Administered Medications: dextrose 10 % infusion, , IntraVENous, Continuous  dextrose bolus 10% 125 mL, 125 mL, IntraVENous, PRN **OR** dextrose bolus 10% 250 mL, 250 mL, IntraVENous, PRN  sodium chloride flush 0.9 % injection 5-40 mL, 5-40 mL, IntraVENous, 2 times per day  sodium chloride flush 0.9 % injection 5-40 mL, 5-40 mL, IntraVENous, PRN  0.9 % sodium chloride infusion, , IntraVENous, PRN  enoxaparin (LOVENOX) injection 40 mg, 40 mg, SubCUTAneous, Daily  ondansetron (ZOFRAN-ODT) disintegrating tablet 4 mg, 4 mg, Oral, Q8H PRN **OR** ondansetron (ZOFRAN) injection 4 mg, 4 mg, IntraVENous, Q6H PRN  polyethylene glycol (GLYCOLAX) packet 17 g, 17 g, Oral, Daily PRN  acetaminophen (TYLENOL) tablet 650 mg, 650 mg, Oral, Q6H PRN **OR** acetaminophen (TYLENOL) suppository 650 mg, 650 mg, Rectal, Q6H PRN  dextrose bolus 10% 250 mL, 250 mL, IntraVENous, Once  carvedilol (COREG) tablet 6.25 mg, 6.25 mg, Oral, BID WC  ferrous sulfate (IRON 325) tablet 325 mg, 325 mg, Oral, Daily with breakfast  torsemide (DEMADEX) tablet 20 mg, 20 mg, Oral, Once per day on Mon Wed Fri  levothyroxine (SYNTHROID) tablet 75 mcg, 75 mcg, Oral, Daily  aspirin chewable tablet 81 mg, 81 mg, Oral, Nightly  [COMPLETED] meropenem (MERREM) 1,000 mg in sodium chloride 0.9 % 100 mL IVPB (mini-bag), 1,000 mg, IntraVENous, Once **FOLLOWED BY** meropenem (MERREM) 1,000 mg in sodium chloride 0.9 % 100 mL IVPB (mini-bag), 1,000 mg, IntraVENous, Q12H  glucose chewable tablet 16 g, 4 tablet, Oral, PRN  dextrose bolus 10% 125 mL, 125 mL, IntraVENous, PRN **OR** dextrose bolus 10% 250 mL, 250 mL, IntraVENous, PRN  glucagon (rDNA) injection 1 mg, 1 mg, SubCUTAneous, PRN  dextrose 10 % infusion, , IntraVENous, Continuous PRN    Allergies:  Patient has no known allergies. Social History:   TOBACCO:   reports that he quit smoking about 46 years ago. His smoking use included cigarettes. He smoked an average of .5 packs per day. He has never used smokeless tobacco.  ETOH:   reports no history of alcohol use. DRUGS:   reports no history of drug use.     Family History:       Problem Relation Age of Onset    Cancer Mother     Diabetes Father     Heart Disease Father     High Blood Pressure Father     Diabetes Sister     High Blood Pressure Sister     Cancer Brother         prostate, breast    Diabetes Brother     Cancer Maternal Uncle     Diabetes Maternal Grandmother     Stroke Maternal Grandfather        REVIEW OF SYSTEMS:     Review of systems not obtained due to patient factors - mental status    PHYSICAL EXAM:      VITALS:  BP (!) 183/68   Pulse 61   Temp 96.8 °F (36 °C) (Rectal)   Resp 10   Ht 5' 8\" (1.727 m)   Wt 201 lb 15.1 oz (91.6 kg)   SpO2 (!) 85%   BMI 30.71 kg/m²      TEMPERATURE:  Current - Temp: 96.8 °F (36 °C); Max - Temp  Av.5 °F (35.8 °C)  Min: 94.4 °F (34.7 °C)  Max: 97.9 °F (36.6 °C)  24HR BLOOD PRESSURE RANGE:  Systolic (90OVK), TZA:783 , Min:100 , JFZ:884   ; Diastolic (33HVN), AGUILAR:78, Min:46, Max:123    8HR INTAKE OUTPUT:  No intake/output data recorded. URINARY CATHETER OUTPUT (Hopper):     DRAIN/TUBE OUTPUT:        General appearance: appears stated age, no distress, moderately obese, and somnolent  Head: Normocephalic, without obvious abnormality, atraumatic  Back:  No CVA tenderness  Abdomen:  Soft, nontender, nondistended. 16 [place] suprapubic catheter in place draining clear light yellow urine.   : External catheter in place    DATA:    WBC:    Lab Results   Component Value Date/Time    WBC 7.0 10/25/2022 05:39 AM     Hemoglobin/Hematocrit:    Lab Results   Component Value Date/Time    HGB 8.2 10/25/2022 05:39 AM    HCT 26.2 10/25/2022 05:39 AM     BMP:    Lab Results   Component Value Date/Time     10/25/2022 05:39 AM    K 5.2 10/25/2022 05:39 AM    K 3.9 2018 04:42 AM     10/25/2022 05:39 AM    CO2 23 10/25/2022 05:39 AM    BUN 48 10/25/2022 05:39 AM    LABALBU 3.4 10/25/2022 05:39 AM    CREATININE 2.1 10/25/2022 05:39 AM    CALCIUM 8.9 10/25/2022 05:39 AM    GFRAA 31 10/13/2022 06:30 AM    LABGLOM 30 10/25/2022 05:39 AM     PT/INR:    Lab Results   Component Value Date/Time    PROTIME 14.0 10/24/2022 12:20 PM    PROTIME 15.2 2011 06:48 PM    INR 1.08 10/24/2022 12:20 PM     Blood Culture:  gram-positive cocci  Urine Culture: Pending    Imaging:  CT HEAD WO CONTRAST    Result Date: 10/24/2022  EXAMINATION: CT OF THE HEAD WITHOUT CONTRAST  10/24/2022 12:41 pm TECHNIQUE: CT of the head was performed without the administration of intravenous contrast. Automated exposure control, iterative reconstruction, and/or weight based adjustment of the mA/kV was utilized to reduce the radiation dose to as low as reasonably achievable. COMPARISON: 04/18/2022 HISTORY: ORDERING SYSTEM PROVIDED HISTORY: HEAD TRAUMA, CLOSED, MILD, GCS >= 13, NO RISK FACTORS, NEURO EXAM NORMAL TECHNOLOGIST PROVIDED HISTORY: Has a \"code stroke\" or \"stroke alert\" been called? ->No Reason for exam:->slurred speech Decision Support Exception - unselect if not a suspected or confirmed emergency medical condition->Emergency Medical Condition (MA) Reason for Exam: slurred speech, Head trauma, closed, mild, GCS >= 13, no risk factors, neuro exam normal FINDINGS: BRAIN/VENTRICLES: There is no acute intracranial hemorrhage, mass effect or midline shift. No abnormal extra-axial fluid collection. The gray-white differentiation is maintained without evidence of an acute infarct. There is no evidence of hydrocephalus. Mild chronic microvascular ischemic change. ORBITS: The visualized portion of the orbits demonstrate no acute abnormality. SINUSES: The visualized paranasal sinuses and mastoid air cells demonstrate no acute abnormality. SOFT TISSUES/SKULL:  No acute abnormality of the visualized skull or soft tissues. No acute intracranial abnormality. XR CHEST PORTABLE    Result Date: 10/24/2022  EXAMINATION: ONE XRAY VIEW OF THE CHEST 10/24/2022 11:41 am COMPARISON: 09/10/2022. HISTORY: ORDERING SYSTEM PROVIDED HISTORY: stroke symptoms TECHNOLOGIST PROVIDED HISTORY: Reason for exam:->stroke symptoms Reason for Exam: STROKE SYMPTOMS FINDINGS: The heart size is within normal limits. The pulmonary vasculature is also within normal limits. No acute infiltrates are seen. The costophrenic angles are sharp bilaterally. No pneumothoraces are noted. There has been removal of the right central venous line. 1. No active pulmonary disease.      CT CHEST ABDOMEN PELVIS W CONTRAST Additional Contrast? None    Result Date: 10/24/2022  EXAMINATION: CT OF THE CHEST, ABDOMEN, AND PELVIS WITH CONTRAST 10/24/2022 5:51 pm TECHNIQUE: CT of the chest, abdomen and pelvis was performed with the administration of intravenous contrast. Multiplanar reformatted images are provided for review. Automated exposure control, iterative reconstruction, and/or weight based adjustment of the mA/kV was utilized to reduce the radiation dose to as low as reasonably achievable. COMPARISON: CT abdomen and pelvis 09/10/2022 HISTORY: ORDERING SYSTEM PROVIDED HISTORY: etiology of sepsis TECHNOLOGIST PROVIDED HISTORY: Reason for exam:->etiology of sepsis Additional Contrast?->None Reason for Exam: etiology of sepsis Additional signs and symptoms: no Relevant Medical/Surgical History: 80 ml isovue 370 used FINDINGS: Chest: Mediastinum: No enlarged lymphadenopathy. Small lymph nodes are scattered. Small fixed hiatus hernia. Minimal wall thickening of the esophagus. Heart: Heart size is normal. No pericardial effusion. Great vessels: the great vessels are patent and unremarkable. Aorta: Aorta is patent and unremarkable. Negative for aneurysm or dissection. Lungs/pleura: Minimal opacities in the lower lobes. Trachea and bronchi are patent. Minimal paraseptal emphysema. No consolidation. Soft Tissues: No enlarged axillary adenopathy. No subcutaneous mass. Thyroid is unremarkable. Appearance of the line in the left arm. However, the tip is in the distal subclavian vein. Osseous: No suspicious lytic or sclerotic lesions. Negative for pneumonia. Mild bibasilar opacities which are most likely atelectasis. Small fixed hiatus hernia. Abdomen/Pelvis: Liver: Liver is normal density. No enhancing masses. Normal enhancement of the intrahepatic vasculature. Spleen:  Normal size. No enhancing masses calcified granulomata in the spleen. Pancreas: No enhancing masses. No ductal dilation. No adjacent fatty stranding. Gallbladder no calcified stones or sludge. No pericholecystic fluid. No wall thickening. Bile ducts: No biliary ductal dilation Adrenals:  The adrenal glands are unremarkable Kidneys: Urinary tract stent on the left. The left kidney is mildly atrophic. Perinephric stranding on the left and mild decreased enhancement of the cortex when compared to the right. However, no hydronephrosis. No large mass. Heterogeneity of the contrast enhancement on the left. Left urinary tract stent extends into the bladder. No stones along the pathway of the left ureter. GI: No small bowel dilation. No colonic wall thickening. No large mass. The stomach is unremarkable in appearance but it is underdistended. Moderate amount of stool throughout the colon. Mesentery: No enlarged lymphadenopathy. No free fluid. No free gas. Aorta: Aorta is of normal size. Negative for dissection. IVC is unremarkable. Celiac axis and SMA are patent. Portal vein is patent. Atherosclerotic change of the aorta in the renal arteries. PELVIS GI: No small bowel dilation. No colonic wall thickening. No enlarged lymphadenopathy. Small amount of free fluid in the pelvis. : The bladder is unremarkable in appearance. No large mass. Suprapubic catheter. The bladder is completely decompressed. Prostate appears to have been resected. Phleboliths in the pelvis. Multiple clips in the pelvis. No enlarged lymphadenopathy. Osseous: Bone island in the right ilium. No lytic destructive lesions. Bone island in the right acetabulum also unchanged. IMPRESSION: 1. Left urinary tract stent is in place. Probable pyelonephritis of the left kidney. Assessment & Plan:      Michelet Woodruff is a 78 y.o. male admitted 10/24/2022 for AMS, hypoglycemia, UTI    1) Urinary tract infection: Prior blood culture 8/30 ESBL E. coli, repeat blood culture 9/9 Candida. Was on Fetroja and fluconazole per ID. Urine culture 10/9 ESBL E. coli, on ertapenem as outpatient completed 10/23. UA showing moderate leuks and large blood but negative nitrites and no bacteria seen on microscopy. Blood cultures 1/4 gram-positive cocci. Urine culture pending.   2) Chronic urine retention: Managed by suprapubic catheter. Per report there were issues with the catheter being clogged as well as urine leakage from the urethra. 12 [place] SPT exchange at the bedside today (10/25)  3) Chronic left hydronephrosis: Managed by ureteral stent exchange every 3 months. Recent exchange earlier this month was rescheduled due to ongoing anemia   Will likely need cystoscopy/left ureteral stent exchange this admission    Patient seen and examined, chart reviewed.    Electronically signed by PAWEL Nam on 10/25/2022 at 9:40 AM

## 2022-10-25 NOTE — PROGRESS NOTES
V2.0  [facility] [place] [facility] Hospitalist Progress Note      Name:  Claudeen Bare /Age/Sex: 1940  (78 y.o. male)   MRN & CSN:  [id number] & [id number] Encounter Date/Time: 10/25/2022 5:04 PM EDT    Location:  -A PCP: Robin Lo MD       Hospital Day: 2    Assessment and Plan:   Claudeen Bare is a 78 y.o. 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 coli on ertapenem as outpatient; completed 10/23  -Urine culture pending  -Urology following    Elevated troponin  - trop T 0.044, chronically elevated in setting of CKD  - EKG with sinus bradycardia, no acute ST T wave changes  Unable to answer if he has a chest pain     Chronic anemia   - Hgb 8.2, near baseline      CKDIII   - Cr 2.1, near baseline   - avoid nephrotoxins     Diet Diet NPO   DVT Prophylaxis [x] Lovenox, []  Heparin, [] SCDs, [] Ambulation,  [] Eliquis, [] Xarelto  [] Coumadin   Code Status Full Code   Disposition From: ECF  Expected Disposition: ECF  Estimated Date of Discharge: TBD  Patient requires continued admission due to encephalopathy   Surrogate Decision Maker/ POA Son     Subjective:     Chief Complaint: Hypoglycemia (39 per ems upon arrival. Glucagon given at nursing home and D10 given per medics)     Patient seen and examined this morning. He remains encephalopathic. He is moaning but is unable to follow any commands. There is no family at the bedside.       Review of Systems:    Review of Systems    Unable to obtain secondary to encephalopathy    Objective: Intake/Output Summary (Last 24 hours) at 10/25/2022 1704  Last data filed at 10/25/2022 0400  Gross per 24 hour   Intake 260 ml   Output 1060 ml   Net -800 ml        Vitals:   Vitals:    10/25/22 1500   BP:    Pulse:    Resp:    Temp: 97.5 °F (36.4 °C)   SpO2:        Physical Exam:     GEN    patient is drowsy, opens eyes to voice commands, appears stated age, not in respiratory distress  EYES   Pupils are equally round. No scleral erythema, discharge, or conjunctivitis. HENT  Mucous membranes are moist.  NECK  Supple, no apparent thyromegaly or masses. RESP  Clear to auscultation, no wheezes, rales or rhonchi. Respirations even and unlabored on RA. CARDIO/VASC   S1/S2 auscultated. Regular rate without appreciable murmurs, rubs, or gallops. Peripheral pulses equal bilaterally and palpable. No peripheral edema. GI        Abdomen is soft without significant tenderness, masses, or guarding.        No costovertebral angle tenderness. Hopper catheter is not present. MSK    No gross joint deformities. SKIN    Normal coloration, warm, dry.   NEURO  Patient is drowsy, opens eyes to voice commands, unable to answer orientation questions, unable to follow commands, mumbling words, spontaneous movement all 4 extremities  PSYCH    UTO    Medications:   Medications:    hydrocortisone sodium succinate PF  50 mg IntraVENous Q6H    [START ON 10/26/2022] pantoprazole  40 mg Oral QAM AC    sodium chloride flush  5-40 mL IntraVENous 2 times per day    enoxaparin  40 mg SubCUTAneous Daily    dextrose bolus  250 mL IntraVENous Once    carvedilol  6.25 mg Oral BID WC    ferrous sulfate  325 mg Oral Daily with breakfast    torsemide  20 mg Oral Once per day on Mon Wed Fri    levothyroxine  75 mcg Oral Daily    aspirin  81 mg Oral Nightly    meropenem  1,000 mg IntraVENous Q12H      Infusions:    dextrose 100 mL/hr at 10/25/22 1308    sodium chloride      dextrose       PRN Meds: dextrose bolus, 125 mL, PRN   Or  dextrose bolus, 250 mL, PRN  sodium chloride flush, 5-40 mL, PRN  sodium chloride, , PRN  ondansetron, 4 mg, Q8H PRN   Or  ondansetron, 4 mg, Q6H PRN  polyethylene glycol, 17 g, Daily PRN  acetaminophen, 650 mg, Q6H PRN   Or  acetaminophen, 650 mg, Q6H PRN  glucose, 4 tablet, PRN  dextrose bolus, 125 mL, PRN   Or  dextrose bolus, 250 mL, PRN  glucagon (rDNA), 1 mg, PRN  dextrose, , Continuous PRN        Labs      Recent Results (from the past 24 hour(s))   POCT Glucose    Collection Time: 10/24/22  5:13 PM   Result Value Ref Range    POC Glucose 48 (L) 70 - 99 MG/DL   POCT Glucose    Collection Time: 10/24/22  5:53 PM   Result Value Ref Range    POC Glucose 70 70 - 99 MG/DL   POCT Glucose    Collection Time: 10/24/22  6:38 PM   Result Value Ref Range    POC Glucose 172 (H) 70 - 99 MG/DL   POCT Glucose    Collection Time: 10/24/22  8:47 PM   Result Value Ref Range    POC Glucose 64 (L) 70 - 99 MG/DL   POCT Glucose    Collection Time: 10/24/22  9:39 PM   Result Value Ref Range    POC Glucose 154 (H) 70 - 99 MG/DL   CBC    Collection Time: 10/25/22  5:39 AM   Result Value Ref Range    WBC 7.0 4.0 - 10.5 K/CU MM    RBC 2.71 (L) 4.6 - 6.2 M/CU MM    Hemoglobin 8.2 (L) 13.5 - 18.0 GM/DL    Hematocrit 26.2 (L) 42 - 52 %    MCV 96.7 78 - 100 FL    MCH 30.3 27 - 31 PG    MCHC 31.3 (L) 32.0 - 36.0 %    RDW 16.2 (H) 11.7 - 14.9 %    Platelets 240 618 - 734 K/CU MM    MPV 11.3 (H) 7.5 - 11.1 FL   Basic Metabolic Panel    Collection Time: 10/25/22  5:39 AM   Result Value Ref Range    Sodium 139 135 - 145 MMOL/L    Potassium 5.2 (H) 3.5 - 5.1 MMOL/L    Chloride 106 99 - 110 mMol/L    CO2 23 21 - 32 MMOL/L    Anion Gap 10 4 - 16    BUN 48 (H) 6 - 23 MG/DL    Creatinine 2.1 (H) 0.9 - 1.3 MG/DL    Est, Glom Filt Rate 30 (L) >60 mL/min/1.73m2    Glucose 33 (LL) 70 - 99 MG/DL    Calcium 8.9 8.3 - 10.6 MG/DL   Hepatic Function Panel    Collection Time: 10/25/22  5:39 AM   Result Value Ref Range    Albumin 3.4 3.4 - 5.0 GM/DL    Total Bilirubin 0.2 0.0 - 1.0 MG/DL    Bilirubin, Direct 0.2 0.0 - 0.3 MG/DL    Bilirubin, Indirect 0.0 0 - 0.7 MG/DL    Alkaline Phosphatase 100 40 - 129 IU/L    AST 16 15 - 37 IU/L    ALT 8 (L) 10 - 40 U/L    Total Protein 6.5 6.4 - 8.2 GM/DL   POCT Glucose    Collection Time: 10/25/22  6:57 AM   Result Value Ref Range    POC Glucose 37 (L) 70 - 99 MG/DL   POCT Glucose    Collection Time: 10/25/22  7:44 AM   Result Value Ref Range    POC Glucose 121 (H) 70 - 99 MG/DL   POCT Glucose    Collection Time: 10/25/22  8:49 AM   Result Value Ref Range    POC Glucose 89 70 - 99 MG/DL   POCT Glucose    Collection Time: 10/25/22  9:57 AM   Result Value Ref Range    POC Glucose 88 70 - 99 MG/DL   POCT Glucose    Collection Time: 10/25/22 11:56 AM   Result Value Ref Range    POC Glucose 71 70 - 99 MG/DL   POCT Glucose    Collection Time: 10/25/22  1:49 PM   Result Value Ref Range    POC Glucose 78 70 - 99 MG/DL   POCT Glucose    Collection Time: 10/25/22  4:11 PM   Result Value Ref Range    POC Glucose 67 (L) 70 - 99 MG/DL   POCT Glucose    Collection Time: 10/25/22  4:53 PM   Result Value Ref Range    POC Glucose 122 (H) 70 - 99 MG/DL        Imaging/Diagnostics Last 24 Hours   CT HEAD WO CONTRAST    Result Date: 10/24/2022  EXAMINATION: CT OF THE HEAD WITHOUT CONTRAST  10/24/2022 12:41 pm TECHNIQUE: CT of the head was performed without the administration of intravenous contrast. Automated exposure control, iterative reconstruction, and/or weight based adjustment of the mA/kV was utilized to reduce the radiation dose to as low as reasonably achievable. COMPARISON: 04/18/2022 HISTORY: ORDERING SYSTEM PROVIDED HISTORY: HEAD TRAUMA, CLOSED, MILD, GCS >= 13, NO RISK FACTORS, NEURO EXAM NORMAL TECHNOLOGIST PROVIDED HISTORY: Has a \"code stroke\" or \"stroke alert\" been called? ->No Reason for exam:->slurred speech Decision Support Exception - unselect if not a suspected or confirmed emergency medical condition->Emergency Medical Condition (MA) Reason for Exam: slurred speech, Head trauma, closed, mild, GCS >= 13, no risk factors, neuro exam normal FINDINGS: BRAIN/VENTRICLES: There is no acute intracranial hemorrhage, mass effect or midline shift. No abnormal extra-axial fluid collection. The gray-white differentiation is maintained without evidence of an acute infarct. There is no evidence of hydrocephalus. Mild chronic microvascular ischemic change. ORBITS: The visualized portion of the orbits demonstrate no acute abnormality. SINUSES: The visualized paranasal sinuses and mastoid air cells demonstrate no acute abnormality. SOFT TISSUES/SKULL:  No acute abnormality of the visualized skull or soft tissues. No acute intracranial abnormality. XR CHEST PORTABLE    Result Date: 10/24/2022  EXAMINATION: ONE XRAY VIEW OF THE CHEST 10/24/2022 11:41 am COMPARISON: 09/10/2022. HISTORY: ORDERING SYSTEM PROVIDED HISTORY: stroke symptoms TECHNOLOGIST PROVIDED HISTORY: Reason for exam:->stroke symptoms Reason for Exam: STROKE SYMPTOMS FINDINGS: The heart size is within normal limits. The pulmonary vasculature is also within normal limits. No acute infiltrates are seen. The costophrenic angles are sharp bilaterally. No pneumothoraces are noted. There has been removal of the right central venous line. 1. No active pulmonary disease. CT CHEST ABDOMEN PELVIS W CONTRAST Additional Contrast? None    Result Date: 10/24/2022  EXAMINATION: CT OF THE CHEST, ABDOMEN, AND PELVIS WITH CONTRAST 10/24/2022 5:51 pm TECHNIQUE: CT of the chest, abdomen and pelvis was performed with the administration of intravenous contrast. Multiplanar reformatted images are provided for review. Automated exposure control, iterative reconstruction, and/or weight based adjustment of the mA/kV was utilized to reduce the radiation dose to as low as reasonably achievable.  COMPARISON: CT abdomen and pelvis 09/10/2022 HISTORY: ORDERING SYSTEM PROVIDED HISTORY: etiology of sepsis TECHNOLOGIST PROVIDED HISTORY: Reason for exam:->etiology of sepsis Additional Contrast?->None Reason for Exam: etiology of sepsis Additional signs and symptoms: no Relevant Medical/Surgical History: 80 ml isovue 370 used FINDINGS: Chest: Mediastinum: No enlarged lymphadenopathy. Small lymph nodes are scattered. Small fixed hiatus hernia. Minimal wall thickening of the esophagus. Heart: Heart size is normal. No pericardial effusion. Great vessels: the great vessels are patent and unremarkable. Aorta: Aorta is patent and unremarkable. Negative for aneurysm or dissection. Lungs/pleura: Minimal opacities in the lower lobes. Trachea and bronchi are patent. Minimal paraseptal emphysema. No consolidation. Soft Tissues: No enlarged axillary adenopathy. No subcutaneous mass. Thyroid is unremarkable. Appearance of the line in the left arm. However, the tip is in the distal subclavian vein. Osseous: No suspicious lytic or sclerotic lesions. Negative for pneumonia. Mild bibasilar opacities which are most likely atelectasis. Small fixed hiatus hernia. Abdomen/Pelvis: Liver: Liver is normal density. No enhancing masses. Normal enhancement of the intrahepatic vasculature. Spleen:  Normal size. No enhancing masses calcified granulomata in the spleen. Pancreas: No enhancing masses. No ductal dilation. No adjacent fatty stranding. Gallbladder no calcified stones or sludge. No pericholecystic fluid. No wall thickening. Bile ducts: No biliary ductal dilation Adrenals: The adrenal glands are unremarkable Kidneys: Urinary tract stent on the left. The left kidney is mildly atrophic. Perinephric stranding on the left and mild decreased enhancement of the cortex when compared to the right. However, no hydronephrosis. No large mass. Heterogeneity of the contrast enhancement on the left. Left urinary tract stent extends into the bladder. No stones along the pathway of the left ureter.  GI: No small bowel dilation. No colonic wall thickening. No large mass. The stomach is unremarkable in appearance but it is underdistended. Moderate amount of stool throughout the colon. Mesentery: No enlarged lymphadenopathy. No free fluid. No free gas. Aorta: Aorta is of normal size. Negative for dissection. IVC is unremarkable. Celiac axis and SMA are patent. Portal vein is patent. Atherosclerotic change of the aorta in the renal arteries. PELVIS GI: No small bowel dilation. No colonic wall thickening. No enlarged lymphadenopathy. Small amount of free fluid in the pelvis. : The bladder is unremarkable in appearance. No large mass. Suprapubic catheter. The bladder is completely decompressed. Prostate appears to have been resected. Phleboliths in the pelvis. Multiple clips in the pelvis. No enlarged lymphadenopathy. Osseous: Bone island in the right ilium. No lytic destructive lesions. Bone island in the right acetabulum also unchanged. IMPRESSION: 1. Left urinary tract stent is in place. Probable pyelonephritis of the left kidney.      Total critical care time 36 minutes excluding all billable procedures    Electronically signed by VIVIANA Thurman CNP on 10/25/2022 at 5:04 PM

## 2022-10-25 NOTE — PROGRESS NOTES
V2.0  [facility] [place]      Name:  Lakesha Casper /Age/Sex: 1940  (78 y.o. male)   MRN & CSN:  [id number] & [id number] Encounter Date/Time: 10/25/2022 2:03 PM EDT    Location:  -A PCP: Melissa Medina MD       Hospital Day: 2    Assessment and Plan:   Lakesha Casper is a 78 y.o. male  who presents with Hypoglycemia      Plan:  Acute severe and refractory hypoglycemia, unknown etiology on 22 units lantus nightly, Found unresponsive at nursing home, initial blood glucose in the 30s, currently on D10 100 ml/hr  Acute metabolic encephalopathy S/T severe and refractory hypoglycemia  CT head no acute process  CT chest, abdomen, pelvis ordered showed probable pyelonephritis of L kidney, currently on meropenm    Elevated troponin  - trop T 0.044, chronically elevated in setting of CKD  - EKG with sinus bradycardia, no acute ST T wave changes  Unable to answer if he has a chest pain     Chronic anemia   - Hgb 8.2, near baseline      CKDIII   - Cr 2.0, near baseline   - avoid nephrotoxins     Diet Diet NPO   DVT Prophylaxis [x] Lovenox, []  Heparin, [] SCDs, [] Ambulation,  [] Eliquis, [] Xarelto  [] Coumadin   Code Status Full Code   Disposition From: SNF  Expected Disposition: SNF  Estimated Date of Discharge: 3 days  Patient requires continued admission due to    Surrogate Decision Maker/ POA      Subjective:     Chief Complaint: Hypoglycemia (39 per ems upon arrival. Glucagon given at nursing home and D10 given per medics)       Lakesha Casper is a 78 y.o. male who presents with hypoglycemia. Very drowsy this a.m., is moaning but is not able to follow commands. Review of Systems:    Review of Systems  Unable to obtain ROS due to mentation. Objective:      Intake/Output Summary (Last 24 hours) at 10/25/2022 1403  Last data filed at 10/25/2022 0400  Gross per 24 hour   Intake 260 ml   Output 1060 ml   Net -800 ml        Vitals:   Vitals:    10/25/22 1300   BP: (!) 115/51 Pulse: 55   Resp: 11   Temp: (!) 94.3 °F (34.6 °C)   SpO2:        Physical Exam:   Physical Exam   GEN    patient is drowsy, opens eyes to voice commands, appears stated age, not in respiratory distress  EYES   Pupils are equally round. No scleral erythema, discharge, or conjunctivitis. HENT  Mucous membranes are moist.  NECK  Supple, no apparent thyromegaly or masses. RESP  Clear to auscultation, no wheezes, rales or rhonchi. Respirations even and unlabored on RA. CARDIO/VASC   S1/S2 auscultated. Regular rate without appreciable murmurs, rubs, or gallops. Peripheral pulses equal bilaterally and palpable. No peripheral edema. GI        Abdomen is soft without significant tenderness, masses, or guarding.        No costovertebral angle tenderness. Hopper catheter is not present. MSK    No gross joint deformities. SKIN    Normal coloration, warm, dry.   NEURO: Patient is drowsy, opens eyes to voice commands, unable to answer orientation questions, unable to follow commands, mumbling words, spontaneous movement all 4 extremities  PSYCH            will assess mood as patient is confused    Medications:   Medications:    hydrocortisone sodium succinate PF  50 mg IntraVENous Q6H    [START ON 10/26/2022] pantoprazole  40 mg Oral QAM AC    sodium chloride flush  5-40 mL IntraVENous 2 times per day    enoxaparin  40 mg SubCUTAneous Daily    dextrose bolus  250 mL IntraVENous Once    carvedilol  6.25 mg Oral BID WC    ferrous sulfate  325 mg Oral Daily with breakfast    torsemide  20 mg Oral Once per day on Mon Wed Fri    levothyroxine  75 mcg Oral Daily    aspirin  81 mg Oral Nightly    meropenem  1,000 mg IntraVENous Q12H      Infusions:    dextrose 100 mL/hr at 10/25/22 1308    sodium chloride      dextrose       PRN Meds: dextrose bolus, 125 mL, PRN   Or  dextrose bolus, 250 mL, PRN  sodium chloride flush, 5-40 mL, PRN  sodium chloride, , PRN  ondansetron, 4 mg, Q8H PRN   Or  ondansetron, 4 mg, Q6H PRN  polyethylene glycol, 17 g, Daily PRN  acetaminophen, 650 mg, Q6H PRN   Or  acetaminophen, 650 mg, Q6H PRN  glucose, 4 tablet, PRN  dextrose bolus, 125 mL, PRN   Or  dextrose bolus, 250 mL, PRN  glucagon (rDNA), 1 mg, PRN  dextrose, , Continuous PRN        Labs      Recent Results (from the past 24 hour(s))   Lactate, Sepsis    Collection Time: 10/24/22  2:22 PM   Result Value Ref Range    Lactic Acid, Sepsis 0.6 0.5 - 1.9 mMOL/L   POCT Glucose    Collection Time: 10/24/22  2:50 PM   Result Value Ref Range    POC Glucose 93 70 - 99 MG/DL   POCT Glucose    Collection Time: 10/24/22  3:52 PM   Result Value Ref Range    POC Glucose 58 (L) 70 - 99 MG/DL   Blood gas, arterial    Collection Time: 10/24/22  4:15 PM   Result Value Ref Range    pH, Bld 7.32 (L) 7.34 - 7.45    pCO2, Arterial 49.0 (H) 32 - 45 MMHG    pO2, Arterial 37 (L) 75 - 100 MMHG    Base Excess 1 0 - 3.3    HCO3, Arterial 25.2 (H) 18 - 23 MMOL/L    CO2 Content 26.7 (H) 19 - 24 MMOL/L    O2 Sat 65.5 (L) 96 - 97 %    Carbon Monoxide, Blood 1.8 0 - 5 %    Methemoglobin, Arterial 1.3 <1.5 %    Comment VENOUS PT HARD STICK    POCT Glucose    Collection Time: 10/24/22  5:13 PM   Result Value Ref Range    POC Glucose 48 (L) 70 - 99 MG/DL   POCT Glucose    Collection Time: 10/24/22  5:53 PM   Result Value Ref Range    POC Glucose 70 70 - 99 MG/DL   POCT Glucose    Collection Time: 10/24/22  6:38 PM   Result Value Ref Range    POC Glucose 172 (H) 70 - 99 MG/DL   POCT Glucose    Collection Time: 10/24/22  8:47 PM   Result Value Ref Range    POC Glucose 64 (L) 70 - 99 MG/DL   POCT Glucose    Collection Time: 10/24/22  9:39 PM   Result Value Ref Range    POC Glucose 154 (H) 70 - 99 MG/DL   CBC    Collection Time: 10/25/22  5:39 AM   Result Value Ref Range    WBC 7.0 4.0 - 10.5 K/CU MM    RBC 2.71 (L) 4.6 - 6.2 M/CU MM    Hemoglobin 8.2 (L) 13.5 - 18.0 GM/DL    Hematocrit 26.2 (L) 42 - 52 %    MCV 96.7 78 - 100 FL    MCH 30.3 27 - 31 PG    MCHC 31.3 (L) 32.0 - 36.0 %    RDW 16.2 (H) 11.7 - 14.9 %    Platelets 918 823 - 581 K/CU MM    MPV 11.3 (H) 7.5 - 11.1 FL   Basic Metabolic Panel    Collection Time: 10/25/22  5:39 AM   Result Value Ref Range    Sodium 139 135 - 145 MMOL/L    Potassium 5.2 (H) 3.5 - 5.1 MMOL/L    Chloride 106 99 - 110 mMol/L    CO2 23 21 - 32 MMOL/L    Anion Gap 10 4 - 16    BUN 48 (H) 6 - 23 MG/DL    Creatinine 2.1 (H) 0.9 - 1.3 MG/DL    Est, Glom Filt Rate 30 (L) >60 mL/min/1.73m2    Glucose 33 (LL) 70 - 99 MG/DL    Calcium 8.9 8.3 - 10.6 MG/DL   Hepatic Function Panel    Collection Time: 10/25/22  5:39 AM   Result Value Ref Range    Albumin 3.4 3.4 - 5.0 GM/DL    Total Bilirubin 0.2 0.0 - 1.0 MG/DL    Bilirubin, Direct 0.2 0.0 - 0.3 MG/DL    Bilirubin, Indirect 0.0 0 - 0.7 MG/DL    Alkaline Phosphatase 100 40 - 129 IU/L    AST 16 15 - 37 IU/L    ALT 8 (L) 10 - 40 U/L    Total Protein 6.5 6.4 - 8.2 GM/DL   POCT Glucose    Collection Time: 10/25/22  6:57 AM   Result Value Ref Range    POC Glucose 37 (L) 70 - 99 MG/DL   POCT Glucose    Collection Time: 10/25/22  7:44 AM   Result Value Ref Range    POC Glucose 121 (H) 70 - 99 MG/DL   POCT Glucose    Collection Time: 10/25/22  8:49 AM   Result Value Ref Range    POC Glucose 89 70 - 99 MG/DL   POCT Glucose    Collection Time: 10/25/22  9:57 AM   Result Value Ref Range    POC Glucose 88 70 - 99 MG/DL   POCT Glucose    Collection Time: 10/25/22 11:56 AM   Result Value Ref Range    POC Glucose 71 70 - 99 MG/DL   POCT Glucose    Collection Time: 10/25/22  1:49 PM   Result Value Ref Range    POC Glucose 78 70 - 99 MG/DL        Imaging/Diagnostics Last 24 Hours   CT HEAD WO CONTRAST    Result Date: 10/24/2022  EXAMINATION: CT OF THE HEAD WITHOUT CONTRAST  10/24/2022 12:41 pm TECHNIQUE: CT of the head was performed without the administration of intravenous contrast. Automated exposure control, iterative reconstruction, and/or weight based adjustment of the mA/kV was utilized to reduce the radiation dose to as low as reasonably achievable. COMPARISON: 04/18/2022 HISTORY: ORDERING SYSTEM PROVIDED HISTORY: HEAD TRAUMA, CLOSED, MILD, GCS >= 13, NO RISK FACTORS, NEURO EXAM NORMAL TECHNOLOGIST PROVIDED HISTORY: Has a \"code stroke\" or \"stroke alert\" been called? ->No Reason for exam:->slurred speech Decision Support Exception - unselect if not a suspected or confirmed emergency medical condition->Emergency Medical Condition (MA) Reason for Exam: slurred speech, Head trauma, closed, mild, GCS >= 13, no risk factors, neuro exam normal FINDINGS: BRAIN/VENTRICLES: There is no acute intracranial hemorrhage, mass effect or midline shift. No abnormal extra-axial fluid collection. The gray-white differentiation is maintained without evidence of an acute infarct. There is no evidence of hydrocephalus. Mild chronic microvascular ischemic change. ORBITS: The visualized portion of the orbits demonstrate no acute abnormality. SINUSES: The visualized paranasal sinuses and mastoid air cells demonstrate no acute abnormality. SOFT TISSUES/SKULL:  No acute abnormality of the visualized skull or soft tissues. No acute intracranial abnormality. XR CHEST PORTABLE    Result Date: 10/24/2022  EXAMINATION: ONE XRAY VIEW OF THE CHEST 10/24/2022 11:41 am COMPARISON: 09/10/2022. HISTORY: ORDERING SYSTEM PROVIDED HISTORY: stroke symptoms TECHNOLOGIST PROVIDED HISTORY: Reason for exam:->stroke symptoms Reason for Exam: STROKE SYMPTOMS FINDINGS: The heart size is within normal limits. The pulmonary vasculature is also within normal limits. No acute infiltrates are seen. The costophrenic angles are sharp bilaterally. No pneumothoraces are noted. There has been removal of the right central venous line. 1. No active pulmonary disease.      CT CHEST ABDOMEN PELVIS W CONTRAST Additional Contrast? None    Result Date: 10/24/2022  EXAMINATION: CT OF THE CHEST, ABDOMEN, AND PELVIS WITH CONTRAST 10/24/2022 5:51 pm TECHNIQUE: CT of the chest, abdomen and pelvis was performed with the administration of intravenous contrast. Multiplanar reformatted images are provided for review. Automated exposure control, iterative reconstruction, and/or weight based adjustment of the mA/kV was utilized to reduce the radiation dose to as low as reasonably achievable. COMPARISON: CT abdomen and pelvis 09/10/2022 HISTORY: ORDERING SYSTEM PROVIDED HISTORY: etiology of sepsis TECHNOLOGIST PROVIDED HISTORY: Reason for exam:->etiology of sepsis Additional Contrast?->None Reason for Exam: etiology of sepsis Additional signs and symptoms: no Relevant Medical/Surgical History: 80 ml isovue 370 used FINDINGS: Chest: Mediastinum: No enlarged lymphadenopathy. Small lymph nodes are scattered. Small fixed hiatus hernia. Minimal wall thickening of the esophagus. Heart: Heart size is normal. No pericardial effusion. Great vessels: the great vessels are patent and unremarkable. Aorta: Aorta is patent and unremarkable. Negative for aneurysm or dissection. Lungs/pleura: Minimal opacities in the lower lobes. Trachea and bronchi are patent. Minimal paraseptal emphysema. No consolidation. Soft Tissues: No enlarged axillary adenopathy. No subcutaneous mass. Thyroid is unremarkable. Appearance of the line in the left arm. However, the tip is in the distal subclavian vein. Osseous: No suspicious lytic or sclerotic lesions. Negative for pneumonia. Mild bibasilar opacities which are most likely atelectasis. Small fixed hiatus hernia. Abdomen/Pelvis: Liver: Liver is normal density. No enhancing masses. Normal enhancement of the intrahepatic vasculature. Spleen:  Normal size. No enhancing masses calcified granulomata in the spleen. Pancreas: No enhancing masses. No ductal dilation. No adjacent fatty stranding. Gallbladder no calcified stones or sludge. No pericholecystic fluid. No wall thickening. Bile ducts: No biliary ductal dilation Adrenals:  The adrenal glands are unremarkable Kidneys: Urinary tract stent on the left. The left kidney is mildly atrophic. Perinephric stranding on the left and mild decreased enhancement of the cortex when compared to the right. However, no hydronephrosis. No large mass. Heterogeneity of the contrast enhancement on the left. Left urinary tract stent extends into the bladder. No stones along the pathway of the left ureter. GI: No small bowel dilation. No colonic wall thickening. No large mass. The stomach is unremarkable in appearance but it is underdistended. Moderate amount of stool throughout the colon. Mesentery: No enlarged lymphadenopathy. No free fluid. No free gas. Aorta: Aorta is of normal size. Negative for dissection. IVC is unremarkable. Celiac axis and SMA are patent. Portal vein is patent. Atherosclerotic change of the aorta in the renal arteries. PELVIS GI: No small bowel dilation. No colonic wall thickening. No enlarged lymphadenopathy. Small amount of free fluid in the pelvis. : The bladder is unremarkable in appearance. No large mass. Suprapubic catheter. The bladder is completely decompressed. Prostate appears to have been resected. Phleboliths in the pelvis. Multiple clips in the pelvis. No enlarged lymphadenopathy. Osseous: Bone island in the right ilium. No lytic destructive lesions. Bone island in the right acetabulum also unchanged. IMPRESSION: 1. Left urinary tract stent is in place. Probable pyelonephritis of the left kidney.        Electronically signed by Abbie Palafox MD on 10/25/2022 at 2:03 PM

## 2022-10-25 NOTE — PROGRESS NOTES
Urology consulted. Patient sees Dr. Lester Oliveira, however Dr. Saad Goldman notified via [employer] since that is who is on call.

## 2022-10-26 LAB
ANION GAP SERPL CALCULATED.3IONS-SCNC: 13 MMOL/L (ref 4–16)
BUN BLDV-MCNC: 44 MG/DL (ref 6–23)
CALCIUM SERPL-MCNC: 8.7 MG/DL (ref 8.3–10.6)
CHLORIDE BLD-SCNC: 101 MMOL/L (ref 99–110)
CO2: 20 MMOL/L (ref 21–32)
CREAT SERPL-MCNC: 2.1 MG/DL (ref 0.9–1.3)
CULTURE: NORMAL
GFR SERPL CREATININE-BSD FRML MDRD: 30 ML/MIN/1.73M2
GLUCOSE BLD-MCNC: 138 MG/DL (ref 70–99)
GLUCOSE BLD-MCNC: 189 MG/DL (ref 70–99)
GLUCOSE BLD-MCNC: 283 MG/DL (ref 70–99)
GLUCOSE BLD-MCNC: 287 MG/DL (ref 70–99)
GLUCOSE BLD-MCNC: 381 MG/DL (ref 70–99)
GLUCOSE BLD-MCNC: 385 MG/DL (ref 70–99)
GLUCOSE BLD-MCNC: 388 MG/DL (ref 70–99)
GLUCOSE BLD-MCNC: 392 MG/DL (ref 70–99)
GLUCOSE BLD-MCNC: 425 MG/DL (ref 70–99)
HCT VFR BLD CALC: 27.1 % (ref 42–52)
HEMOGLOBIN: 8 GM/DL (ref 13.5–18)
Lab: NORMAL
MCH RBC QN AUTO: 30 PG (ref 27–31)
MCHC RBC AUTO-ENTMCNC: 29.5 % (ref 32–36)
MCV RBC AUTO: 101.5 FL (ref 78–100)
PDW BLD-RTO: 15.9 % (ref 11.7–14.9)
PLATELET # BLD: 170 K/CU MM (ref 140–440)
PMV BLD AUTO: 12.2 FL (ref 7.5–11.1)
POTASSIUM SERPL-SCNC: 4.6 MMOL/L (ref 3.5–5.1)
RBC # BLD: 2.67 M/CU MM (ref 4.6–6.2)
SODIUM BLD-SCNC: 134 MMOL/L (ref 135–145)
SPECIMEN: NORMAL
WBC # BLD: 3.5 K/CU MM (ref 4–10.5)

## 2022-10-26 PROCEDURE — 80048 BASIC METABOLIC PNL TOTAL CA: CPT

## 2022-10-26 PROCEDURE — 97166 OT EVAL MOD COMPLEX 45 MIN: CPT

## 2022-10-26 PROCEDURE — 6370000000 HC RX 637 (ALT 250 FOR IP): Performed by: NURSE PRACTITIONER

## 2022-10-26 PROCEDURE — 6370000000 HC RX 637 (ALT 250 FOR IP): Performed by: PHYSICIAN ASSISTANT

## 2022-10-26 PROCEDURE — 6360000002 HC RX W HCPCS: Performed by: INTERNAL MEDICINE

## 2022-10-26 PROCEDURE — 1200000000 HC SEMI PRIVATE

## 2022-10-26 PROCEDURE — 97162 PT EVAL MOD COMPLEX 30 MIN: CPT

## 2022-10-26 PROCEDURE — 2580000003 HC RX 258: Performed by: INTERNAL MEDICINE

## 2022-10-26 PROCEDURE — 6360000002 HC RX W HCPCS: Performed by: PHYSICIAN ASSISTANT

## 2022-10-26 PROCEDURE — 2580000003 HC RX 258: Performed by: PHYSICIAN ASSISTANT

## 2022-10-26 PROCEDURE — 97530 THERAPEUTIC ACTIVITIES: CPT

## 2022-10-26 PROCEDURE — 85027 COMPLETE CBC AUTOMATED: CPT

## 2022-10-26 PROCEDURE — 82962 GLUCOSE BLOOD TEST: CPT

## 2022-10-26 PROCEDURE — 94761 N-INVAS EAR/PLS OXIMETRY MLT: CPT

## 2022-10-26 PROCEDURE — 6360000002 HC RX W HCPCS: Performed by: NURSE PRACTITIONER

## 2022-10-26 RX ORDER — INSULIN GLARGINE 100 [IU]/ML
22 INJECTION, SOLUTION SUBCUTANEOUS NIGHTLY
Status: DISCONTINUED | OUTPATIENT
Start: 2022-10-26 | End: 2022-10-27

## 2022-10-26 RX ORDER — INSULIN LISPRO 100 [IU]/ML
0-16 INJECTION, SOLUTION INTRAVENOUS; SUBCUTANEOUS EVERY 4 HOURS
Status: DISCONTINUED | OUTPATIENT
Start: 2022-10-26 | End: 2022-11-03

## 2022-10-26 RX ORDER — INSULIN LISPRO 100 [IU]/ML
0-4 INJECTION, SOLUTION INTRAVENOUS; SUBCUTANEOUS NIGHTLY
Status: DISCONTINUED | OUTPATIENT
Start: 2022-10-26 | End: 2022-11-03

## 2022-10-26 RX ADMIN — INSULIN GLARGINE 12 UNITS: 100 INJECTION, SOLUTION SUBCUTANEOUS at 21:56

## 2022-10-26 RX ADMIN — ASPIRIN 81 MG CHEWABLE TABLET 81 MG: 81 TABLET CHEWABLE at 21:47

## 2022-10-26 RX ADMIN — LEVOTHYROXINE SODIUM 75 MCG: 0.07 TABLET ORAL at 06:07

## 2022-10-26 RX ADMIN — HYDROCORTISONE SODIUM SUCCINATE 50 MG: 100 INJECTION, POWDER, FOR SOLUTION INTRAMUSCULAR; INTRAVENOUS at 00:38

## 2022-10-26 RX ADMIN — CARVEDILOL 6.25 MG: 6.25 TABLET, FILM COATED ORAL at 17:27

## 2022-10-26 RX ADMIN — ENOXAPARIN SODIUM 40 MG: 40 INJECTION SUBCUTANEOUS at 09:25

## 2022-10-26 RX ADMIN — INSULIN LISPRO 8 UNITS: 100 INJECTION, SOLUTION INTRAVENOUS; SUBCUTANEOUS at 13:54

## 2022-10-26 RX ADMIN — MEROPENEM 1000 MG: 1 INJECTION, POWDER, FOR SOLUTION INTRAVENOUS at 22:00

## 2022-10-26 RX ADMIN — PANTOPRAZOLE SODIUM 40 MG: 40 TABLET, DELAYED RELEASE ORAL at 06:07

## 2022-10-26 RX ADMIN — HYDROCORTISONE SODIUM SUCCINATE 50 MG: 100 INJECTION, POWDER, FOR SOLUTION INTRAMUSCULAR; INTRAVENOUS at 06:08

## 2022-10-26 RX ADMIN — SODIUM CHLORIDE, PRESERVATIVE FREE 10 ML: 5 INJECTION INTRAVENOUS at 22:00

## 2022-10-26 RX ADMIN — MEROPENEM 1000 MG: 1 INJECTION, POWDER, FOR SOLUTION INTRAVENOUS at 09:31

## 2022-10-26 RX ADMIN — CARVEDILOL 6.25 MG: 6.25 TABLET, FILM COATED ORAL at 09:24

## 2022-10-26 RX ADMIN — FERROUS SULFATE TAB 325 MG (65 MG ELEMENTAL FE) 325 MG: 325 (65 FE) TAB at 09:25

## 2022-10-26 RX ADMIN — SODIUM CHLORIDE, PRESERVATIVE FREE 10 ML: 5 INJECTION INTRAVENOUS at 13:56

## 2022-10-26 RX ADMIN — TORSEMIDE 20 MG: 20 TABLET ORAL at 09:24

## 2022-10-26 ASSESSMENT — PAIN SCALES - GENERAL
PAINLEVEL_OUTOF10: 0

## 2022-10-26 NOTE — PROGRESS NOTES
Physical Therapy  [facility] ACUTE CARE PHYSICAL THERAPY EVALUATION  Darlene Torres, 1940, 2125/2125-A, 10/26/2022    History  Nikolai:  The primary encounter diagnosis was Hypoglycemia. A diagnosis of Hypothermia, initial encounter was also pertinent to this visit. Patient  has a past medical history of Acute urinary tract infection, Ataxia, Diabetes mellitus ([address] Fusion of spine of cervical region, Gait disturbance, History of prostate cancer, History of tobacco use, Hyperlipidemia, and Osteoarthritis. Patient  has a past surgical history that includes Prostate surgery; other surgical history (3/28/13); Colon surgery; Bladder surgery; Prostatectomy (1996); Bladder surgery (Left, 3/17/2022); and Cystoscopy (Left, 9/7/2022). Subjective:  Patient states:  \"Well her name was Serge Rae. .\", \"You know they had me standing up well\". Pt with intermittent confusion and statements non-related to topic. Pain:  Pt reports mild pain at abdomen, unrated. Communication with other providers:  Handoff to RN, co-eval with Naila REZA for safety.    Restrictions: Fall risk, mild confusion, suprapubic catheter, tele, IV, fall risk, Contact    Home Setup/Prior level of function   Lives With: Son  Type of Home: House  Home Layout: One level  Home Access: Ramped entrance  Bathroom Shower/Tub: Tub/Shower unit,Shower chair with back  Bathroom Toilet: Standard  Bathroom Equipment: Grab bars in shower,Shower chair  Bathroom Accessibility: Radhika Baez accessible (states once he is in bathroom he uses countertops and grab bars, but pt not able to clarify if this is because there is little reynaldo or just preference)  Home Equipment: [address] walker (pt thinks there may be a BSC from his wife in storage, but unsure)  ADL Assistance: Independent  Homemaking Assistance: Independent  Ambulation Assistance: Independent (mod I with 4ww)  Transfer Assistance: Independent  Active : No  Patient's  Info: 3 sons assist in driving. Mode of Transportation: Car,SUV  Occupation: Retired  Type of occupation: Retired from [address] [place] [address] Street: TV, very little reading, no pets, doctors, son manages groceries, Meds are managed via pill dispenser that son manages. Pt occasionally writes checks and some auto pay  **Per charting pt has been receiving rehab at SNF up until admission. Examination of body systems (includes body structures/functions, activity/participation limitations):  Observation:  Pt is awake in semi-fowlers upon arrival  Vision:  Glasses, WFL  Hearing:  [address]  Cardiopulmonary:  On room air, stable  Cognition: Impaired- pt with mild confusion. Appears A&O x2-3, see OT/SLP note for further evaluation. Musculoskeletal  ROM R/L:  WFL BLE. Strength R/L:  Generally 4-/5 BLE, decreased in function and endurance. Neuro:  Decreased seated and standing balance, retropulsive    Gait pattern: deferred     Mobility:  Supine to sit:  Max x1  Transfers: Mod x2  Sitting balance:  Mod .    Standing balance:  Mod>MAx. Gait: deferred    [facility] 6 Clicks Inpatient Mobility:  AM-PAC Inpatient Mobility Raw Score : 11    Treatment:  Bed mobility: Pt required min increased time and cues to re-direct pt to task, pt with mild confusion throughout. PT provided v/c and t/c for LE advancement to EOB, pt able to partially advance with Min A, required Max A x1 for advancement of hips and trunk to EOB. Pt with fair effort in use of UE to push trunk to upright, limited by weakness and balance deficits. Sitting balance: initial seated balance EOB pt with inadequate balance to perform scooting tasks, Max A via chucks pad required. With hips aligned in seated pt continued to require Mod A posteriorly due to increased retro lean- pt is able to lean forward with cues- however over time pt reverts to retro/L lateral lean.  Seated balance EOB ~4 min for education and preparation for transfers,     STS: Increased time and cues for pt placement of UE with RW d/t confusion. Pt does perform anterior scooting with increased time, Min A, UE support. BLE blocked for safety, RW stabilized, pt required Mod A x2 to advance to upright d/t LE weakness and balance. Standing balance: Pt with heavy retro lean, weight-bearing in heels initially with Max A required to correct retro lean, advance RW anterior to correct. Pt briefly improved to Mod A, quickly regressed to Max A d/t retro and L lateral leaning despite v/c and t/c. Total standing time ~35 seconds. Further mobility deferred for safety. Return to seated Mod A. Sit>supine with Cues and Max x1 for LE to EOB. Scooting to [facility] Max x2. Safety: patient left in semi-fowlers with all needs, call light within reach, RN notified, gait belt used. Assessment:    Pt is an 77 y/o male admitted 10/24 with c/o  hypoglycemia. Patient with significant h/o Acute urinary tract infection, Ataxia, Diabetes mellitus ([address] Fusion of spine of cervical region, Gait disturbance, History of prostate cancer, History of tobacco use, Hyperlipidemia, see chart. Per pt report pt has been performing ADLs/IADLs Acute urinary tract infection, Ataxia, Diabetes mellitus ([address] Fusion of spine of cervical region, Gait disturbance, History of prostate cancer, History of tobacco use, Hyperlipidemia. At this time pt appears to be functioning below baseline. Per pt reports he has been standing with rehab staff at SNF prior to admission. Pt is now presenting with impairments in BLE strength, functional endurance, safety awareness, balance, proprioception, transfers. Pt would benefit from skilled PT services in order to address impairments and promote return to PLOF. PT to recommend d/c to SNF. Complexity: Moderate  Prognosis: Good, no significant barriers to participation at this time.    Plan General Plan: 3-5 times per week/week, 1 week,   Discharge Recommendations: Subacute/Skilled Nursing Facility  Equipment: Defer    Goals:  Short Term Goals  Time Frame for Short Term Goals: 1 week  Short Term Goal 1: Pt will perform sup>sit with Min A and cues  Short Term Goal 2: Pt will perform standing balance tasks x5' with UE support and Min A  Short Term Goal 3: Pt will AMB x15 ft with RW, chair follow, Mod A       Treatment plan:  Bed mobility, transfers, balance, gait, TA, TX    Recommendations for NURSING mobility: Supine<>sit with Mod x2    Time:   Time in: 10:25  Time out: 10:56  Timed treatment minutes: 20  Total time: 31    Electronically signed by:    Samantha Diaz, PT  59/45/7025, 6:07 PM  PT Lic #: [id number]

## 2022-10-26 NOTE — DISCHARGE INSTR - COC
Continuity of Care Form    Patient Name: Luis Alberto Mims   :  1940  MRN:  [id number]    [address] [place] [address] date:  10/24/2022  Discharge date:  2022      Code Status Order: Full Code   Advance Directives:     Admitting Physician:  Nicole Oneill MD  PCP: Kevin Ramsay MD    Discharging Nurse: [facility] Unit/Room#: 2125/2125-A  Discharging Unit Phone Number: [phone]    Emergency Contact:   Extended Emergency Contact Information  Primary Emergency Contact: [address] Phone: [phone]  Relation: Child  Secondary Emergency Contact: Miguelina Toribio, [address] Phone: [phone]  Mobile Phone: [phone]  Relation: Child  Preferred language: English   needed? No    Past Surgical History:  Past Surgical History:   Procedure Laterality Date    BLADDER SURGERY      BLADDER SURGERY Left 3/17/2022    CYSTOSCOPY LEFT STENT EXCHANGE performed by Richard Mijares MD at [address] [place] [address] 2022    LEFT CYSTOSCOPY URETERAL STENT EXCHANGE AND [address] performed by Governor Jasper MD at [address] [place] [address]  3/28/13    Cysto with removal of artificial sphinter and placement of suprapubic catheter.     PROSTATE SURGERY      PROSTATECTOMY      infection       Immunization History:   Immunization History   Administered Date(s) Administered    COVID-19, MODERNA BLUE border, Primary or Immunocompromised, (age 12y+), IM, 100 mcg/0.5mL 2021, 2021, 2022       Active Problems:  Patient Active Problem List   Diagnosis Code    Gait disturbance R26.9    Generalized weakness R53.1    Diabetes mellitus ([facility] E11.9    Osteoarthritis M19.90    Anemia of chronic disorder D63.8    Infected implanted bladder sphincter cuff T83.69XA    Escherichia coli urinary tract infection N39.0, B96.20    Hypertension I10    Sepsis ([facility] A41.9    Acute encephalopathy G93.40    Type 2 diabetes mellitus with hypoglycemia without coma ([facility] E11.649    UTI (urinary tract infection) due to urinary indwelling catheter ([facility] T83.511A, N39.0    Hyperkalemia E87.5    Acute kidney failure ([facility] N17.9    Leg weakness, bilateral R29.898    Type 2 diabetes mellitus with neurological manifestations, uncontrolled [id number]    Complicated UTI (urinary tract infection) N39.0    Essential hypertension I10    Severe muscle deconditioning R29.898    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 (HCC) E11.69, E78.5    Frequent falls R29.6    Chronic kidney disease, stage 3a ([facility] N18.31    Uncontrolled type 2 diabetes mellitus with peripheral neuropathy [id number]    Prostate cancer ([facility] C61    Suprapubic catheter ([facility] Z93.59    Sepsis due to urinary tract infection ([facility] A41.9, N39.0    Coagulase negative Staphylococcus bacteremia R78.81, B95.7    Chronic kidney disease, stage IV (severe) ([facility] B21.3    Acute metabolic encephalopathy T01.29    SVT (supraventricular tachycardia) ([facility]) W98.6    Metabolic encephalopathy S56.95    History of prostate cancer Z85.46    Cigarette nicotine dependence without complication D65.223    Altered mental status R41.82    Serratia marcescens infection A48.8    Hypoglycemia E16.2    Hospital discharge follow-up Z09    Fungemia B49       Isolation/Infection:   Isolation            Contact          Patient Infection Status       Infection Onset Added Last Indicated Last Indicated By Review Planned Expiration Resolved Resolved By    MRSA 09/12/22 09/14/22 09/12/22 Culture, MRSA, Screening        ESBL (Extended Spectrum Beta Lactamase) 11/16/21 11/18/21 10/09/22 Culture, Urine        MDRO (multi-drug resistant organism) 11/16/21 11/18/21 11/16/21 Culture, Urine        STAPHYLOCOCCUS CAPITIS  blood 2 3/22/22    SERRATIA MARCESCENS  - Urine 4/19/22    Resolved    COVID-19 (Rule Out) 08/28/22 08/28/22 08/28/22 Respiratory Panel, Molecular, with COVID-19 (Restricted: peds pts or suitable admitted adults) (Ordered)   08/28/22 Rule-Out Test Resulted    COVID-19 (Rule Out) 22 COVID-19, Rapid (Ordered)   22 Rule-Out Test Resulted    COVID-19 (Rule Out) 22 COVID-19, Rapid (Ordered)   22 Rule-Out Test Resulted    COVID-19 22 COVID-19, Rapid   22     COVID-19 (Rule Out) 22 COVID-19, Rapid (Ordered)   22 Rule-Out Test Resulted    COVID-19 (Rule Out) 21 COVID-19, Rapid (Ordered)   21 Rule-Out Test Resulted    MRSA  16 Izabela Nelson, RN   18 Izabela Nelson, RN    16 urine            Nurse Assessment:  Last Vital Signs: BP (!) 148/107   Pulse 81   Temp 98.6 °F (37 °C) (Oral)   Resp 12   Ht 5' 8\" (1.727 m)   Wt 213 lb 10 oz (96.9 kg)   SpO2 99%   BMI 32.48 kg/m²     Last documented pain score (0-10 scale): Pain Level: 0  Last Weight:   Wt Readings from Last 1 Encounters:   10/26/22 213 lb 10 oz (96.9 kg)     Mental Status:  disoriented    IV Access:- None  - None    Nursing Mobility/ADLs:  Walking   Dependent  Transfer  Dependent  Bathing  Dependent  Dressing  Dependent  Toileting  Dependent  Feeding  Independent  Med Admin  Dependent  Med Delivery   whole    Wound Care Documentation and Therapy:        Elimination:  Continence: Bowel: Yes  Bladder: Yes  Urinary Catheter: Insertion Date: 2022    Colostomy/Ileostomy/Ileal Conduit: No     Date of Last BM: 11/3/22    Intake/Output Summary (Last 24 hours) at 10/26/2022 1045  Last data filed at 10/26/2022 9027  Gross per 24 hour   Intake 5208.24 ml   Output 2400 ml   Net 2808.24 ml     I/O last 3 completed shifts: In: 5468.2 [P.O.:620; I.V.:4302.7;  Other:260; IV Piggyback:285.6]  Out: 3460 [Urine:3460]    Safety Concerns:     None    Impairments/Disabilities:      None    Patient's personal belongings (please select all that are sent with patient):  Glasses    RN SIGNATURE:  Electronically signed by Eric Deanpool {[facility]:[id number]}

## 2022-10-26 NOTE — PROGRESS NOTES
[facility]   Progress Note  [facility] 0 1 2      Date: 3/19/2022   Patient: Jerel James   : 1940   DOA: 3/16/2022   MRN: [id number]   ROOM#: 3104/3104-A     Admit Date: 3/16/2022     Collaborating Urologist on Call at time of admission: Dr. Clive Mcpherson  CC: AMS  Reason for Consult: Suprapubic catheter, UTI  POD #2 cystoscopy, left ureteral stent exchange, SPT exchange 16 [place]    Subjective:     Pain: mild, no nausea and no vomiting,   Bowel Movement/Flatus:   Yes  Voidinfr SPT in place, urine cloudy yellow with debris in tubing     Pt resting in bed, states he is feeling better overall but having some left lower back pain today that radiates down his left leg. Objective:    Vitals:    BP (!) 155/78   Pulse 76   Temp 97.9 °F (36.6 °C) (Oral)   Resp 18   Ht 6' (1.829 m)   Wt 204 lb 8 oz (92.8 kg)   SpO2 99%   BMI 27.74 kg/m²    Temp  Av.9 °F (36.6 °C)  Min: 97.6 °F (36.4 °C)  Max: 98.5 °F (36.9 °C)       Intake/Output Summary (Last 24 hours) at 3/19/2022 0916  Last data filed at 3/19/2022 0518  Gross per 24 hour   Intake --   Output 2000 ml   Net -2000 ml     Physical Exam:  General appearance: alert, appears stated age, cooperative, fatigued, no distress, and mildly obese  Head: Normocephalic, without obvious abnormality, atraumatic  Back: Mild left CVA tenderness  Abdomen: Soft, non-distended, lower abdominal TTP.  16fr SPT in place, urine clear yellow with debris in tubing     Labs:   WBC:    Lab Results   Component Value Date    WBC 9.1 2022      Hemoglobin/Hematocrit:    Lab Results   Component Value Date    HGB 8.0 2022    HCT 26.4 2022      BMP:   Lab Results   Component Value Date     2022    K 4.3 2022    K 3.9 2018     2022    CO2 24 2022    BUN 60 2022    LABALBU 2.8 2022    CREATININE 3.3 2022    CALCIUM 8.4 2022    GFRAA 22 2022    LABGLOM 18 2022 PT/INR:    Lab Results   Component Value Date    PROTIME 15.5 03/17/2022    PROTIME 15.2 01/24/2011    INR 1.20 03/17/2022      PTT:    Lab Results   Component Value Date    APTT 33.1 03/17/2022     Blood Culture: +Klebsiella pneumoniae and +Streptococcus  Urine Culture: >50,000 CFU/ml Mixed pathogens Multiple organisms isolated, no predominance. Imaging:  CT ABDOMEN PELVIS WO CONTRAST Additional Contrast? None    Result Date: 3/17/2022  EXAMINATION: CT OF THE ABDOMEN AND PELVIS WITHOUT CONTRAST 3/17/2022 3:04 am TECHNIQUE: CT of the abdomen and pelvis was performed without the administration of intravenous contrast. Multiplanar reformatted images are provided for review. Dose modulation, iterative reconstruction, and/or weight based adjustment of the mA/kV was utilized to reduce the radiation dose to as low as reasonably achievable. COMPARISON: 09/12/2020 HISTORY: ORDERING SYSTEM PROVIDED HISTORY: UTI with altered mental status and history of renal stones TECHNOLOGIST PROVIDED HISTORY: Reason for exam:->UTI with altered mental status and history of renal stones Additional Contrast?->None Decision Support Exception - unselect if not a suspected or confirmed emergency medical condition->Emergency Medical Condition (MA) Reason for Exam: UTI with altered mental status and history of renal stones FINDINGS: Lower Chest: Calcified lymph nodes in the right hilar region. Atelectatic or fibrotic changes along the posterior lungs. Organs: Lack of IV contrast does reduce the evaluation of the organs and vasculature. There is streak artifact from the arms at the sides. No obvious masses seen within the liver. The gallbladder is distended and may have a tiny gallstone in the lumen. There is no fat stranding of the gallbladder fossa. The spleen is not enlarged but does have multiple calcified granulomas. No pancreatic calcification or ductal dilatation.  There is stranding and edema adjacent to the pancreatic tail but appears more associated with the left kidney. The adrenal glands are not enlarged. The right kidney has mild perinephric stranding no hydronephrosis or hydroureter. The left kidney does have asymmetric edema and increased perinephric stranding. Small amount of fluid are seen in the pararenal space. A left ureteral stent is present coursing down into the urinary bladder. No focal calcifications are seen along the course of the stent. However there is still the left-sided hydronephrosis and hydroureter. GI/Bowel: The stomach, small bowel, and colon are not dilated. There is a small hiatal hernia with mild thickening of the distal esophagus. Constipation and gaseous distension of the ascending and transverse colon. Appendix is identified and no focal appendicitis. There is no pneumoperitoneum. Pelvis: The urinary bladder is decompressed with a suprapubic catheter. Cannot accurately evaluate the urinary bladder wall. The distal coil of the left ureteral stent is in the inferior aspect versus is an expanded upper portion of the urethral junction. 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 Peritoneum/Retroperitoneum: No abdominal aortic aneurysm but does have moderate calcification. There is no retroperitoneal hematoma. Small left periaortic lymph nodes were present on the previous exam as well. Mild increase in size may be reactive to the left kidney. Bones/Soft Tissues: Degenerative changes in the disc spaces, facet joints, and sacroiliac joints. Large Schmorl node in the inferior endplate of C52 is increased in size. Smaller Schmorl nodes along the lumbar endplates appear stable. 1.  There is left-sided hydronephrosis and hydroureter despite the presence of the left ureteral stent. Increased left perinephric stranding and fluid compared to the right side. Will need to correlate for functionality of the stent versus ascending urinary tract infection.  2.  The urinary bladder is collapsed around the suprapubic catheter. The distal coil of the ureteral stent courses into the urinary bladder along the inferior aspect or an expanded upper portion of the urethral junction. Previous prostate surgery is again noted. 3.  Constipation in the ascending and transverse colon. No small bowel obstruction or pneumoperitoneum. 4.  Mild thickening of the distal esophagus may relate to reflux esophagitis. CT HEAD WO CONTRAST    Result Date: 3/17/2022  EXAMINATION: CT OF THE HEAD WITHOUT CONTRAST  3/17/2022 12:19 am TECHNIQUE: CT of the head was performed without the administration of intravenous contrast. Dose modulation, iterative reconstruction, and/or weight based adjustment of the mA/kV was utilized to reduce the radiation dose to as low as reasonably achievable. COMPARISON: 11/16/2021 HISTORY: ORDERING SYSTEM PROVIDED HISTORY: AMS TECHNOLOGIST PROVIDED HISTORY: Reason for exam:->AMS Has a \"code stroke\" or \"stroke alert\" been called? ->No Decision Support Exception - unselect if not a suspected or confirmed emergency medical condition->Emergency Medical Condition (MA) Reason for Exam: AMS FINDINGS: BRAIN/VENTRICLES: There is no acute intracranial hemorrhage, mass effect or midline shift. No abnormal extra-axial fluid collection. The gray-white differentiation is maintained without evidence of an acute infarct. Hypoattenuation of the periventricular and subcortical white matter is suggestive of chronic small vessel ischemic disease. Mild diffuse parenchymal volume loss is noted. There is no evidence of hydrocephalus. ORBITS: The bilateral globes are intact. SINUSES: Mucoperiosteal thickening of the bilateral ethmoid air cells is noted. Other visualized paranasal sinuses and mastoid air cells are clear. SOFT TISSUES/SKULL:  No acute abnormality of the visualized skull or soft tissues. No acute intracranial abnormality. MRI may be obtained if clinically indicated.      FL LESS THAN 1 HOUR    Result Date: 3/17/2022  Radiology exam is complete. No Radiologist dictation. Please follow up with ordering provider. XR CHEST PORTABLE    Result Date: 3/17/2022  EXAMINATION: ONE XRAY VIEW OF THE CHEST 3/16/2022 11:48 pm COMPARISON: 01/06/2022 HISTORY: ORDERING SYSTEM PROVIDED HISTORY: fever TECHNOLOGIST PROVIDED HISTORY: Reason for exam:->fever Reason for Exam: FEVER FINDINGS: The cardiac silhouette is at the upper limits of normal in size. Vascular calcifications are noted along the aortic arch. Pulmonary edema is increasing. No focal lung infiltrate. No pleural effusion or pneumothorax. The bones are osteopenic. 1. Worsening pulmonary edema. Assessment & Plan:      Adali Garcia is a 78y.o. year old male admitted 3/16/2022 for sepsis secondary to UTI.    1) Chronic Urinary Retention: managed with suprapubic catheter and exchanged monthly, last exchanged 3/17/22.              Recommend SPT exchanges q3 weeks. 2) Sepsis Secondary to Complicated UTI: Urine cx +>50K CFU/ml mixed organisms   Blood cultures +Klebsiella pneumoniae and +Streptococcus              WBC 9.1, afebrile              On IV Merrem; recommend obtaining negative repeat blood cultures prior to discharge. 3) Left Hydronephrosis: POD #2 cystoscopy, left ureteral stent exchange, SPT exchange 16 [address]   S/p cystoscopy, left ureteral stent placement 7/4/21              CT a/p 3/17/22: There is left-sided hydronephrosis and hydroureter despite the presence of the left ureteral stent. Increased left perinephric stranding and fluid compared to the right side. The pt will need q3 month left ureteral stent exchanges  4) PAMELLA on CKD: Cr 3.3 3/17/22, baseline of ~1.6  5) H/o Prostate Cancer: S/p RPP with Dr. Analy Mcdonald in [address] [place] [address]. PSA 0.93 2/2021. On Eligard q6 months     Will follow. Patient seen and examined, chart reviewed.      Electronically signed by Lisbet Arce PA-C on 3/19/2022 at 9:16 AM Walking

## 2022-10-26 NOTE — PROGRESS NOTES
V2.0  [facility] [place] Hospitalist Progress Note      Name:  Tia Mcghee /Age/Sex: 1940  (78 y.o. male)   MRN & CSN:  [id number] & [id number] Encounter Date/Time: 10/26/2022 5:04 PM EDT    Location:  -A PCP: Mirna Hunt MD       Hospital Day: 3    Assessment and Plan:   Tia Mcghee is a 78 y.o. male  with history of diabetes, HLD, tobacco use who presents with Hypoglycemia  10/24: Pt is hemodynamically and medically stable, no critical care needs, CC will sign off    Plan:    Type 2 diabetes mellitus with refractory hypoglycemia- RESOLVED  -Patient was found unresponsive at nursing home, initial blood glucose in the 50s, given glucagon  -Off D10 gtt, hyperglycemia this am, started on lantus and high dose SSI for -400 mg/dL  Diabetic diet      Acute metabolic encephalopathy- RESOLVED  At his baseline this am 10/26  -Secondary to acute hypoglycemia  -CT head no acute process  -Continue to monitor neurological status    UTI- left sided Pyelonephritis  -UA with large leuk esterase, 36 WBC  -Chronic suprapubic catheter, exchanged on 10/25  -Urine culture 10/9 ESBL, E. coli on ertapenem as outpatient; completed 10/23  CT abdomen and Pelvis- left sided pyelonephritis  Started on Merrem-10/25 x 5 days  -Urine culture pending  -Urology following    Chronic left sided hydronephrosis s/p Ureteral stent  Exchanges every 3 months  Scheduled stent exchange this month was canceled due to anemia    Elevated troponin  - trop T 0.044, chronically elevated in setting of CKD  - EKG with sinus bradycardia, no acute ST T wave changes  Unable to answer if he has a chest pain     Chronic anemia   - Hgb 8.2, near baseline      CKDIII   - Cr 2.1, near baseline   - avoid nephrotoxins     Hx of Prostate cancer s/p RPP in     DVT ppx: Lovenox  Gi ppx: protonix, diet  Discussed code status with sons on admission- FULL CODE      Diet ADULT DIET;  Regular; 5 carb choices (75 gm/meal)   DVT Prophylaxis [x] Lovenox, [] Heparin, [] SCDs, [] Ambulation,  [] Eliquis, [] Xarelto  [] Coumadin   Code Status Full Code   Disposition From: ECF  Expected Disposition: ECF  Estimated Date of Discharge: TBD  Patient requires continued admission due to encephalopathy   Surrogate Decision Maker/ POA Son     Subjective:     Chief Complaint: Hypoglycemia (39 per ems upon arrival. Glucagon given at nursing home and D10 given per medics)     Patient seen and examined this morning. Sitting up and eating breakfast.  Back to his baseline, oriented to self and place. Denies SOB, chest pain, palpitations, n/v/abdominal pain, motor / sensory dysfunction    Review of Systems:    Review of Systems  As above    Objective: Intake/Output Summary (Last 24 hours) at 10/26/2022 0745  Last data filed at 10/26/2022 9712  Gross per 24 hour   Intake 5208.24 ml   Output 2400 ml   Net 2808.24 ml          Vitals:   Vitals:    10/26/22 0700   BP: (!) 148/81   Pulse: 83   Resp: 14   Temp:    SpO2:        Physical Exam:     GEN    Pt is a 78 yr/M, appears stated age, NAD  EYES   EOMs intact  HENT  Mucous membranes are moist.  NECK  Supple, no apparent thyromegaly or masses. RESP  Clear to auscultation, no wheezes, rales or rhonchi. Respirations even and unlabored on RA. CARDIO/VASC   S1/S2 auscultated. Regular rate without appreciable murmurs, rubs, or gallops. Peripheral pulses equal bilaterally and palpable. No peripheral edema. GI        Abdomen is soft without significant tenderness, masses, or guarding.        No costovertebral angle tenderness. Hopper catheter is not present. MSK    No gross joint deformities. SKIN    Normal coloration, warm, dry.   NEURO  Patient is alert, oriented to self and place, no focal neurological deficits  PSYCH    pleasant    Medications:   Medications:    insulin lispro  0-16 Units SubCUTAneous Q4H    insulin lispro  0-4 Units SubCUTAneous Nightly    insulin glargine  22 Units SubCUTAneous Nightly    hydrocortisone sodium succinate PF  50 mg IntraVENous Q6H    pantoprazole  40 mg Oral QAM AC    sodium chloride flush  5-40 mL IntraVENous 2 times per day    enoxaparin  40 mg SubCUTAneous Daily    dextrose bolus  250 mL IntraVENous Once    carvedilol  6.25 mg Oral BID WC    ferrous sulfate  325 mg Oral Daily with breakfast    torsemide  20 mg Oral Once per day on Mon Wed Fri    levothyroxine  75 mcg Oral Daily    aspirin  81 mg Oral Nightly    meropenem  1,000 mg IntraVENous Q12H      Infusions:    sodium chloride      dextrose       PRN Meds: dextrose bolus, 125 mL, PRN   Or  dextrose bolus, 250 mL, PRN  sodium chloride flush, 5-40 mL, PRN  sodium chloride, , PRN  ondansetron, 4 mg, Q8H PRN   Or  ondansetron, 4 mg, Q6H PRN  polyethylene glycol, 17 g, Daily PRN  acetaminophen, 650 mg, Q6H PRN   Or  acetaminophen, 650 mg, Q6H PRN  glucose, 4 tablet, PRN  dextrose bolus, 125 mL, PRN   Or  dextrose bolus, 250 mL, PRN  glucagon (rDNA), 1 mg, PRN  dextrose, , Continuous PRN      Labs      Recent Results (from the past 24 hour(s))   POCT Glucose    Collection Time: 10/25/22  8:49 AM   Result Value Ref Range    POC Glucose 89 70 - 99 MG/DL   POCT Glucose    Collection Time: 10/25/22  9:57 AM   Result Value Ref Range    POC Glucose 88 70 - 99 MG/DL   POCT Glucose    Collection Time: 10/25/22 11:56 AM   Result Value Ref Range    POC Glucose 71 70 - 99 MG/DL   POCT Glucose    Collection Time: 10/25/22  1:49 PM   Result Value Ref Range    POC Glucose 78 70 - 99 MG/DL   POCT Glucose    Collection Time: 10/25/22  4:11 PM   Result Value Ref Range    POC Glucose 67 (L) 70 - 99 MG/DL   POCT Glucose    Collection Time: 10/25/22  4:53 PM   Result Value Ref Range    POC Glucose 122 (H) 70 - 99 MG/DL   POCT Glucose    Collection Time: 10/25/22  8:01 PM   Result Value Ref Range    POC Glucose 208 (H) 70 - 99 MG/DL   POCT Glucose    Collection Time: 10/26/22 12:40 AM   Result Value Ref Range    POC Glucose 388 (H) 70 - 99 MG/DL   POCT Glucose    Collection Time: 10/26/22  3:50 AM   Result Value Ref Range    POC Glucose 425 (H) 70 - 99 MG/DL   CBC    Collection Time: 10/26/22  4:30 AM   Result Value Ref Range    WBC 3.5 (L) 4.0 - 10.5 K/CU MM    RBC 2.67 (L) 4.6 - 6.2 M/CU MM    Hemoglobin 8.0 (L) 13.5 - 18.0 GM/DL    Hematocrit 27.1 (L) 42 - 52 %    .5 (H) 78 - 100 FL    MCH 30.0 27 - 31 PG    MCHC 29.5 (L) 32.0 - 36.0 %    RDW 15.9 (H) 11.7 - 14.9 %    Platelets 388 631 - 625 K/CU MM    MPV 12.2 (H) 7.5 - 11.1 FL   Basic Metabolic Panel    Collection Time: 10/26/22  4:30 AM   Result Value Ref Range    Sodium 134 (L) 135 - 145 MMOL/L    Potassium 4.6 3.5 - 5.1 MMOL/L    Chloride 101 99 - 110 mMol/L    CO2 20 (L) 21 - 32 MMOL/L    Anion Gap 13 4 - 16    BUN 44 (H) 6 - 23 MG/DL    Creatinine 2.1 (H) 0.9 - 1.3 MG/DL    Est, Glom Filt Rate 30 (L) >60 mL/min/1.73m2    Glucose 385 (H) 70 - 99 MG/DL    Calcium 8.7 8.3 - 10.6 MG/DL   POCT Glucose    Collection Time: 10/26/22  5:55 AM   Result Value Ref Range    POC Glucose 392 (H) 70 - 99 MG/DL        Imaging/Diagnostics Last 24 Hours   CT HEAD WO CONTRAST    Result Date: 10/24/2022  EXAMINATION: CT OF THE HEAD WITHOUT CONTRAST  10/24/2022 12:41 pm TECHNIQUE: CT of the head was performed without the administration of intravenous contrast. Automated exposure control, iterative reconstruction, and/or weight based adjustment of the mA/kV was utilized to reduce the radiation dose to as low as reasonably achievable. COMPARISON: 04/18/2022 HISTORY: ORDERING SYSTEM PROVIDED HISTORY: HEAD TRAUMA, CLOSED, MILD, GCS >= 13, NO RISK FACTORS, NEURO EXAM NORMAL TECHNOLOGIST PROVIDED HISTORY: Has a \"code stroke\" or \"stroke alert\" been called? ->No Reason for exam:->slurred speech Decision Support Exception - unselect if not a suspected or confirmed emergency medical condition->Emergency Medical Condition (MA) Reason for Exam: slurred speech, Head trauma, closed, mild, GCS >= 13, no risk factors, neuro exam normal FINDINGS: BRAIN/VENTRICLES: There is no acute intracranial hemorrhage, mass effect or midline shift. No abnormal extra-axial fluid collection. The gray-white differentiation is maintained without evidence of an acute infarct. There is no evidence of hydrocephalus. Mild chronic microvascular ischemic change. ORBITS: The visualized portion of the orbits demonstrate no acute abnormality. SINUSES: The visualized paranasal sinuses and mastoid air cells demonstrate no acute abnormality. SOFT TISSUES/SKULL:  No acute abnormality of the visualized skull or soft tissues. No acute intracranial abnormality. XR CHEST PORTABLE    Result Date: 10/24/2022  EXAMINATION: ONE XRAY VIEW OF THE CHEST 10/24/2022 11:41 am COMPARISON: 09/10/2022. HISTORY: ORDERING SYSTEM PROVIDED HISTORY: stroke symptoms TECHNOLOGIST PROVIDED HISTORY: Reason for exam:->stroke symptoms Reason for Exam: STROKE SYMPTOMS FINDINGS: The heart size is within normal limits. The pulmonary vasculature is also within normal limits. No acute infiltrates are seen. The costophrenic angles are sharp bilaterally. No pneumothoraces are noted. There has been removal of the right central venous line. 1. No active pulmonary disease. CT CHEST ABDOMEN PELVIS W CONTRAST Additional Contrast? None    Result Date: 10/24/2022  EXAMINATION: CT OF THE CHEST, ABDOMEN, AND PELVIS WITH CONTRAST 10/24/2022 5:51 pm TECHNIQUE: CT of the chest, abdomen and pelvis was performed with the administration of intravenous contrast. Multiplanar reformatted images are provided for review. Automated exposure control, iterative reconstruction, and/or weight based adjustment of the mA/kV was utilized to reduce the radiation dose to as low as reasonably achievable.  COMPARISON: CT abdomen and pelvis 09/10/2022 HISTORY: ORDERING SYSTEM PROVIDED HISTORY: etiology of sepsis TECHNOLOGIST PROVIDED HISTORY: Reason for exam:->etiology of sepsis Additional Contrast?->None Reason for Exam: etiology of sepsis Additional signs and symptoms: no Relevant Medical/Surgical History: 80 ml isovue 370 used FINDINGS: Chest: Mediastinum: No enlarged lymphadenopathy. Small lymph nodes are scattered. Small fixed hiatus hernia. Minimal wall thickening of the esophagus. Heart: Heart size is normal. No pericardial effusion. Great vessels: the great vessels are patent and unremarkable. Aorta: Aorta is patent and unremarkable. Negative for aneurysm or dissection. Lungs/pleura: Minimal opacities in the lower lobes. Trachea and bronchi are patent. Minimal paraseptal emphysema. No consolidation. Soft Tissues: No enlarged axillary adenopathy. No subcutaneous mass. Thyroid is unremarkable. Appearance of the line in the left arm. However, the tip is in the distal subclavian vein. Osseous: No suspicious lytic or sclerotic lesions. Negative for pneumonia. Mild bibasilar opacities which are most likely atelectasis. Small fixed hiatus hernia. Abdomen/Pelvis: Liver: Liver is normal density. No enhancing masses. Normal enhancement of the intrahepatic vasculature. Spleen:  Normal size. No enhancing masses calcified granulomata in the spleen. Pancreas: No enhancing masses. No ductal dilation. No adjacent fatty stranding. Gallbladder no calcified stones or sludge. No pericholecystic fluid. No wall thickening. Bile ducts: No biliary ductal dilation Adrenals: The adrenal glands are unremarkable Kidneys: Urinary tract stent on the left. The left kidney is mildly atrophic. Perinephric stranding on the left and mild decreased enhancement of the cortex when compared to the right. However, no hydronephrosis. No large mass. Heterogeneity of the contrast enhancement on the left. Left urinary tract stent extends into the bladder. No stones along the pathway of the left ureter. GI: No small bowel dilation. No colonic wall thickening. No large mass. The stomach is unremarkable in appearance but it is underdistended.   Moderate amount of stool throughout the colon. Mesentery: No enlarged lymphadenopathy. No free fluid. No free gas. Aorta: Aorta is of normal size. Negative for dissection. IVC is unremarkable. Celiac axis and SMA are patent. Portal vein is patent. Atherosclerotic change of the aorta in the renal arteries. PELVIS GI: No small bowel dilation. No colonic wall thickening. No enlarged lymphadenopathy. Small amount of free fluid in the pelvis. : The bladder is unremarkable in appearance. No large mass. Suprapubic catheter. The bladder is completely decompressed. Prostate appears to have been resected. Phleboliths in the pelvis. Multiple clips in the pelvis. No enlarged lymphadenopathy. Osseous: Bone island in the right ilium. No lytic destructive lesions. Bone island in the right acetabulum also unchanged. IMPRESSION: 1. Left urinary tract stent is in place. Probable pyelonephritis of the left kidney.          Electronically signed by Gayle Parson PA-C on 10/26/2022 at 7:45 AM

## 2022-10-26 NOTE — PLAN OF CARE
Problem: Discharge Planning  Goal: Discharge to home or other facility with appropriate resources  Outcome: Progressing  Flowsheets (Taken 10/26/2022 0000 by Terell Fuentes RN)  Discharge to home or other facility with appropriate resources: Identify barriers to discharge with patient and caregiver     Problem: Safety - Adult  Goal: Free from fall injury  Outcome: Progressing     Problem: Skin/Tissue Integrity  Goal: Absence of new skin breakdown  Description: 1. Monitor for areas of redness and/or skin breakdown  2. Assess vascular access sites hourly  3. Every 4-6 hours minimum:  Change oxygen saturation probe site  4. Every 4-6 hours:  If on nasal continuous positive airway pressure, respiratory therapy assess nares and determine need for appliance change or resting period.   Outcome: Progressing     Problem: ABCDS Injury Assessment  Goal: Absence of physical injury  Outcome: Progressing

## 2022-10-26 NOTE — PROGRESS NOTES
Provided incontinent care to patient. Had a large dark green BM and had some incontinence with urine despite suprapubic cath. Patient repositioned in bed and set up with dinner at this time.

## 2022-10-26 NOTE — PROGRESS NOTES
V2.0  [facility] [place] Hospitalist Progress Note      Name:  Della Prado /Age/Sex: 1940  (78 y.o. male)   MRN & CSN:  [id number] & [id number] Encounter Date/Time: 10/26/2022 2:03 PM EDT    Location:  -A PCP: Dean Freed MD       Hospital Day: 3    Assessment and Plan:   Della Prado is a 78 y.o. male  who presents with Hypoglycemia      Plan:  Acute severe and refractory hypoglycemia, unknown etiology on 22 units lantus nightly, Found unresponsive at nursing home, initial blood glucose in the 30s, blood sugars elevated in a.m., D10 has been discontinued, continue 22 units Lantus at night with high-dose sliding scale insulin with hypoglycemia protocol. Acute metabolic encephalopathy S/T severe and refractory hypoglycemia  CT head no acute process  CT chest, abdomen, pelvis ordered showed probable pyelonephritis of L kidney, currently on meropenm, plan for cystoscopy/left ureteral stent exchange by urology. Elevated troponin  trop T 0.044, chronically elevated in setting of CKD, EKG with sinus bradycardia, no acute ST T wave changes    Chronic anemia - Hgb 8.2, near baseline   CKDIII Cr 2.0, near baseline avoid nephrotoxins     Diet ADULT DIET; Regular; 5 carb choices (75 gm/meal)   DVT Prophylaxis [x] Lovenox, []  Heparin, [] SCDs, [] Ambulation,  [] Eliquis, [] Xarelto  [] Coumadin   Code Status Full Code   Disposition From: SNF  Expected Disposition: SNF  Estimated Date of Discharge: 3 days  Patient requires continued admission due to    Surrogate Decision Maker/ POA      Subjective:     Chief Complaint: Hypoglycemia (39 per ems upon arrival. Glucagon given at nursing home and D10 given per medics)       Della Prado is a 78 y.o. male who presents with hypoglycemia. Alert and awake this morning, follows commands, does not know why he is in the hospital.  Complains of left flank soreness but he states that is something chronic.   Denies any headache, nausea, vomiting, chest pain, shortness Result Value Ref Range    Sodium 134 (L) 135 - 145 MMOL/L    Potassium 4.6 3.5 - 5.1 MMOL/L    Chloride 101 99 - 110 mMol/L    CO2 20 (L) 21 - 32 MMOL/L    Anion Gap 13 4 - 16    BUN 44 (H) 6 - 23 MG/DL    Creatinine 2.1 (H) 0.9 - 1.3 MG/DL    Est, Glom Filt Rate 30 (L) >60 mL/min/1.73m2    Glucose 385 (H) 70 - 99 MG/DL    Calcium 8.7 8.3 - 10.6 MG/DL   POCT Glucose    Collection Time: 10/26/22  5:55 AM   Result Value Ref Range    POC Glucose 392 (H) 70 - 99 MG/DL   POCT Glucose    Collection Time: 10/26/22  9:06 AM   Result Value Ref Range    POC Glucose 287 (H) 70 - 99 MG/DL   POCT Glucose    Collection Time: 10/26/22 11:25 AM   Result Value Ref Range    POC Glucose 381 (H) 70 - 99 MG/DL        Imaging/Diagnostics Last 24 Hours   CT HEAD WO CONTRAST    Result Date: 10/24/2022  EXAMINATION: CT OF THE HEAD WITHOUT CONTRAST  10/24/2022 12:41 pm TECHNIQUE: CT of the head was performed without the administration of intravenous contrast. Automated exposure control, iterative reconstruction, and/or weight based adjustment of the mA/kV was utilized to reduce the radiation dose to as low as reasonably achievable. COMPARISON: 04/18/2022 HISTORY: ORDERING SYSTEM PROVIDED HISTORY: HEAD TRAUMA, CLOSED, MILD, GCS >= 13, NO RISK FACTORS, NEURO EXAM NORMAL TECHNOLOGIST PROVIDED HISTORY: Has a \"code stroke\" or \"stroke alert\" been called? ->No Reason for exam:->slurred speech Decision Support Exception - unselect if not a suspected or confirmed emergency medical condition->Emergency Medical Condition (MA) Reason for Exam: slurred speech, Head trauma, closed, mild, GCS >= 13, no risk factors, neuro exam normal FINDINGS: BRAIN/VENTRICLES: There is no acute intracranial hemorrhage, mass effect or midline shift. No abnormal extra-axial fluid collection. The gray-white differentiation is maintained without evidence of an acute infarct. There is no evidence of hydrocephalus. Mild chronic microvascular ischemic change.  ORBITS: The visualized portion of the orbits demonstrate no acute abnormality. SINUSES: The visualized paranasal sinuses and mastoid air cells demonstrate no acute abnormality. SOFT TISSUES/SKULL:  No acute abnormality of the visualized skull or soft tissues. No acute intracranial abnormality. XR CHEST PORTABLE    Result Date: 10/24/2022  EXAMINATION: ONE XRAY VIEW OF THE CHEST 10/24/2022 11:41 am COMPARISON: 09/10/2022. HISTORY: ORDERING SYSTEM PROVIDED HISTORY: stroke symptoms TECHNOLOGIST PROVIDED HISTORY: Reason for exam:->stroke symptoms Reason for Exam: STROKE SYMPTOMS FINDINGS: The heart size is within normal limits. The pulmonary vasculature is also within normal limits. No acute infiltrates are seen. The costophrenic angles are sharp bilaterally. No pneumothoraces are noted. There has been removal of the right central venous line. 1. No active pulmonary disease. CT CHEST ABDOMEN PELVIS W CONTRAST Additional Contrast? None    Result Date: 10/24/2022  EXAMINATION: CT OF THE CHEST, ABDOMEN, AND PELVIS WITH CONTRAST 10/24/2022 5:51 pm TECHNIQUE: CT of the chest, abdomen and pelvis was performed with the administration of intravenous contrast. Multiplanar reformatted images are provided for review. Automated exposure control, iterative reconstruction, and/or weight based adjustment of the mA/kV was utilized to reduce the radiation dose to as low as reasonably achievable. COMPARISON: CT abdomen and pelvis 09/10/2022 HISTORY: ORDERING SYSTEM PROVIDED HISTORY: etiology of sepsis TECHNOLOGIST PROVIDED HISTORY: Reason for exam:->etiology of sepsis Additional Contrast?->None Reason for Exam: etiology of sepsis Additional signs and symptoms: no Relevant Medical/Surgical History: 80 ml isovue 370 used FINDINGS: Chest: Mediastinum: No enlarged lymphadenopathy. Small lymph nodes are scattered. Small fixed hiatus hernia. Minimal wall thickening of the esophagus. Heart: Heart size is normal. No pericardial effusion. Great vessels: the great vessels are patent and unremarkable. Aorta: Aorta is patent and unremarkable. Negative for aneurysm or dissection. Lungs/pleura: Minimal opacities in the lower lobes. Trachea and bronchi are patent. Minimal paraseptal emphysema. No consolidation. Soft Tissues: No enlarged axillary adenopathy. No subcutaneous mass. Thyroid is unremarkable. Appearance of the line in the left arm. However, the tip is in the distal subclavian vein. Osseous: No suspicious lytic or sclerotic lesions. Negative for pneumonia. Mild bibasilar opacities which are most likely atelectasis. Small fixed hiatus hernia. Abdomen/Pelvis: Liver: Liver is normal density. No enhancing masses. Normal enhancement of the intrahepatic vasculature. Spleen:  Normal size. No enhancing masses calcified granulomata in the spleen. Pancreas: No enhancing masses. No ductal dilation. No adjacent fatty stranding. Gallbladder no calcified stones or sludge. No pericholecystic fluid. No wall thickening. Bile ducts: No biliary ductal dilation Adrenals: The adrenal glands are unremarkable Kidneys: Urinary tract stent on the left. The left kidney is mildly atrophic. Perinephric stranding on the left and mild decreased enhancement of the cortex when compared to the right. However, no hydronephrosis. No large mass. Heterogeneity of the contrast enhancement on the left. Left urinary tract stent extends into the bladder. No stones along the pathway of the left ureter. GI: No small bowel dilation. No colonic wall thickening. No large mass. The stomach is unremarkable in appearance but it is underdistended. Moderate amount of stool throughout the colon. Mesentery: No enlarged lymphadenopathy. No free fluid. No free gas. Aorta: Aorta is of normal size. Negative for dissection. IVC is unremarkable. Celiac axis and SMA are patent. Portal vein is patent. Atherosclerotic change of the aorta in the renal arteries.  PELVIS GI: No small bowel dilation. No colonic wall thickening. No enlarged lymphadenopathy. Small amount of free fluid in the pelvis. : The bladder is unremarkable in appearance. No large mass. Suprapubic catheter. The bladder is completely decompressed. Prostate appears to have been resected. Phleboliths in the pelvis. Multiple clips in the pelvis. No enlarged lymphadenopathy. Osseous: Bone island in the right ilium. No lytic destructive lesions. Bone island in the right acetabulum also unchanged. IMPRESSION: 1. Left urinary tract stent is in place. Probable pyelonephritis of the left kidney.        Electronically signed by Eliz Mccartney MD on 10/26/2022 at 11:48 AM

## 2022-10-26 NOTE — CONSULTS
[address] [place] [address], 1940, 2125/2125-A, 10/26/2022      Discharge Recommendation: SNF     History:  [place]:  The primary encounter diagnosis was Hypoglycemia. A diagnosis of Hypothermia, initial encounter was also pertinent to this visit. Pt  has a past medical history of Acute urinary tract infection, Ataxia, Diabetes mellitus ([address] Fusion of spine of cervical region, Gait disturbance, History of prostate cancer, History of tobacco use, Hyperlipidemia, and Osteoarthritis. Subjective:  Patient states: \"Well now I am starting over at square one. \" Pt presented with mild confusion and would intermittently reply with sentences not related to tasks or conversation at hand. Pain: declined   Communication with other providers: Coeval with PT for pt safety and tolerance, RN handoff   Restrictions: general precautions, fall risk, tele, external cath   No one at bedside      Home Setup/Prior level of function:  Social/Functional History  Lives With: Son  Type of Home: House  Home Layout: One level  Home Access: Ramped entrance  Bathroom Shower/Tub: Tub/Shower unit,Shower chair with back  Bathroom Toilet: Standard  Bathroom Equipment: Grab bars in shower,Shower chair  Bathroom Accessibility: [place] accessible (states once he is in bathroom he uses countertops and grab bars, but pt not able to clarify if this is because there is little room or just preference)  Home Equipment: [address] walker (pt thinks there may be a BSC from his wife in storage, but unsure)  ADL Assistance: Independent  Homemaking Assistance: Independent  Ambulation Assistance: Independent (mod I with 4ww)  Transfer Assistance: Independent  Active : No  Patient's  Info: 3 sons assist in driving.   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 Pt occasionally writes checks and some auto pay  Per CM charting, pt has been in SNF receiving rehab since September. Examination:  Observation: Pt was in bed upon arrival, agreeable to session. Pt attempted to be seen earlier and was self-feeding. Pt requested to return. Upon second arrival, pt found with finished breakfast in highfowlers. Vision: WFL (glasses)  Hearing: WFL  Cardiopulmonary: on RA    Body Systems and functions:  ROM: WFL in BUE, with exception to R shoulder scaption ~30 degrees and L shoulder scaption ~90 degrees. Strength: 4-/5 in BUE  Sensation: WFL  Tone: normotonic in BUE  Coordination: movements fluid and coordinated  Posture: normal posture  Activity Tolerance: Fair     Activities of Daily Living (ADLs):  Feeding: SetupA   Grooming: Pippa  Toileting: DEP   UB dressing: ModA   LB dressing: DEP   UB bathing: ModA   LB bathing: ModA       *pt ADL function inferred from gross functional assessment of mobility, balance, posture, safety awareness, activity tolerance (unless otherwise indicated)    Cognitive and Psychosocial Functioning:  Overall cognitive status:    10/26/22 1701   Orientation   Orientation Level Oriented to place;Oriented to person  (When asked time, pt reported \"11\". Therapist assumed month number and pt told October is actually correct. Pt misspoke birthday initially and immediately self-corrected. Pt does not appear fully cognizant of situation at hand.)   Cognition   Overall Cognitive Status Exceptions   Arousal/Alertness Appropriate responses to stimuli   Following Commands Follows one step commands with increased time; Follows one step commands with repetition   Attention Span Attends with cues to redirect   Memory Decreased recall of recent events   Safety Judgement Decreased awareness of need for safety   Problem Solving Decreased awareness of errors   Insights Decreased awareness of deficits   Initiation Requires cues for some   Sequencing Requires cues for some   Cognition Comment Pt demo some confusion throughout and pt arose topics not relevant to situation. Affect: normal     Balance:   Sitting: ModA d/t retro lean with intermittent periods of CGA   Standing: MaxA     Functional Mobility:  Rolling Laterally in Bed: MaxA   Supine to Sit: MaxA   Sit to Stand: ModAx2    Ambulation: NT d/t safety concerns       AM-PAC 6 click short form for inpatient daily activity:   How much help from another person does the patient currently need. .. Unable  Dep A Lot  Max A A Lot   Mod A A Little  Min A A Little   CGA  SBA None   Mod I  Indep  Sup   1. Putting on and taking off regular lower body clothing? [x] 1    [] 2   [] 2   [] 3   [] 3   [] 4      2. Bathing (including washing, rinsing, drying)? [] 1   [] 2   [x] 2 [] 3 [] 3 [] 4   3. Toileting, which includes using toilet, bedpan, or urinal? [x] 1    [] 2   [] 2   [] 3   [] 3   [] 4     4. Putting on and taking off regular upper body clothing? [] 1   [] 2   [x] 2   [] 3   [] 3    [] 4      5. Taking care of personal grooming such as brushing teeth? [] 1   [] 2    [] 2 [x] 3    [] 3   [] 4      6. Eating meals? [] 1   [] 2   [] 2   [] 3   [x] 3   [] 4        Raw Score:  12      24/24 = unimpaired  23/24 = 1-20% impaired   20/24-22/24 = 21-40% impaired  15/24-19/24 = 41-59% impaired   10/24-14/24 = 60%-79% impaired  7/24-9/24 = 80%-99% impaired  6/24 = 100% impaired     Treatment:  Therapeutic Activity Training:   Therapeutic activity training was instructed today. Cues were given for safety, sequence, UE/LE placement, visual cues, and balance. Activities performed today included:    Bed mobility:  Pt completed sup to sit with MaxA at hips and trunk. Pt progressed BLE to EOB with Pippa. Pt required inc time d/t redirection to task and mild confusion with commands.    Pt tolerated EOB fairly with ModA d/t retro lean and intermittent episodes of CGA with max cues for weight shift. Pt required Pippa for anterior scooting prior to STS. STS:  Pt completed STS from EOB ModAx2 to [address] with assist to reach BUE to [address]. Pt completed stand to seated EOB ModA d/t poor eccentric control. Standing:  Pt maintained standing ~30 seconds while 1 therapist maintained standing balance at [address] and other therapist changed chuckpads. Pt expressed fatigue and declined further mobility or self-care tasks. Return to bed:   Pt completed sit to sup MaxA. Pt completed repositioning to [facility] DEPx2. Education: Role of OT, OT POC, discharge needs, safety, benefits of EOB/OOB activity, AE needs    Safety Measures: Gait belt used for safety of pt and therapist, Left in bed per request, Alarm in place, call light and phone within reach, lines managed, needs met     Assessment:  Pt is a 78 yr old male from SNF who presents with Hypoglycemia. A diagnosis of Hypothermia, initial encounter was also pertinent to this visit. Per charting, pt found in SNF [facility]) with AMS and BG of 50. Prior to admission, pt was actively being rehabilitated and not at typical baseline. Pt currently performed bed mobility ModAx2, transfers ModAx2, unable to ambulate, and subsequently impaired occupational performance. Pt would benefit from return to SNF to continue to address impairments and safely return pt to typical baseline. Pt would benefit from continued IP OT services during their stay, and discharge to SNF.     Complexity: Moderate   Prognosis: Good   Barriers: strength, endurance, cognition     Plan:  Plan: 3+/week    Treatment to include: Strengthening, ROM, Balance Training, Functional Mobility Training, Endurance Training, Gait Training, Pain Management, Safety Education and Training, Patient+Caregiver Education and Training, Equipment Evaluation Education and Procurement, Positioning, Self Care Training, Home Management Training, Coordination Training    Pt would benefit from continued edu on: continue to monitor   Adaptive Equipment Recommendations: defer to next LOC     Goals:  Time frame for goals: 2 weeks  Pt will complete grooming tasks with CGA at standing level with chair nearby for breaks PRN   Pt will complete toileting tasks with MaxA using BSC   Pt will complete UB dressing tasks with Pippa seated EOB   Pt will complete LB dressing tasks with MaxA seated EOB and STS for hike   Pt will complete UB bathing tasks with Pippa seated and AE PRN   Pt will complete LB bathing tasks with Pippa seated and AE PRN   Pt will complete therapeutic exercise/activity to increase independence in ADL/IADL function  Pt will practice functional transfers and mobility with AD for increased safety and independence    Time:   Time in: 1029  Time out: 1055  Treatment Minutes: 16  Evaluation Minutes: 10  Total time: 26    Electronically signed by:    EMA Carranza/NORA   License: [id number]  72/52/8696, 10:34 AM

## 2022-10-26 NOTE — PROGRESS NOTES
[facility] [address]   Progress Note  [facility] 0 1 2  Date: 10/26/2022   Patient: Kimberly Das   : 1940   DOA: 10/24/2022   MRN: [id number]   ROOM#: 2125/2125-A     Admit Date: 10/24/2022     Collaborating Urologist on Call at time of admission: Dr. Vanessa Locke    CC: AMS    Subjective:     Pain: none, no nausea and no vomiting,   Bowel Movement/Flatus:   Yes  Voiding: Suprapubic catheter draining clear yellow urine,     Patient doing well. Much more alert and interactive. Possibly still some confusion at times? Denying complaints at this time    Objective:      Vitals:    BP (!) 142/58   Pulse 79   Temp 98.6 °F (37 °C) (Oral)   Resp 15   Ht 5' 8\" (1.727 m)   Wt 213 lb 10 oz (96.9 kg)   SpO2 99%   BMI 32.48 kg/m²    Temp  Av.5 °F (36.4 °C)  Min: 93.7 °F (34.3 °C)  Max: 99.1 °F (37.3 °C)     Intake/Output Summary (Last 24 hours) at 10/26/2022 0931  Last data filed at 10/26/2022 1841  Gross per 24 hour   Intake 5208.24 ml   Output 2400 ml   Net 2808.24 ml       Physical Exam:   General appearance: alert, appears stated age, cooperative, fatigued, and no distress  Head: Normocephalic, without obvious abnormality, atraumatic  Back:  No CVA tenderness  Abdomen: Soft, nontender, nondistended. 16 French SPT with clear yellow urine.     Labs:   WBC:    Lab Results   Component Value Date/Time    WBC 3.5 10/26/2022 04:30 AM      Hemoglobin/Hematocrit:    Lab Results   Component Value Date/Time    HGB 8.0 10/26/2022 04:30 AM    HCT 27.1 10/26/2022 04:30 AM      BMP:   Lab Results   Component Value Date/Time     10/26/2022 04:30 AM    K 4.6 10/26/2022 04:30 AM    K 3.9 2018 04:42 AM     10/26/2022 04:30 AM    CO2 20 10/26/2022 04:30 AM    BUN 44 10/26/2022 04:30 AM    LABALBU 3.4 10/25/2022 05:39 AM    CREATININE 2.1 10/26/2022 04:30 AM    CALCIUM 8.7 10/26/2022 04:30 AM    GFRAA 31 10/13/2022 06:30 AM    LABGLOM 30 10/26/2022 04:30 AM      PT/INR:    Lab Results Component Value Date/Time    PROTIME 14.0 10/24/2022 12:20 PM    PROTIME 15.2 01/24/2011 06:48 PM    INR 1.08 10/24/2022 12:20 PM      PTT:    Lab Results   Component Value Date/Time    APTT 37.6 09/12/2022 01:24 PM      Blood Culture: 1/4 bottles gram-positive cocci   Urine Culture: No growth    Imaging:   CT HEAD WO CONTRAST    Result Date: 10/24/2022  EXAMINATION: CT OF THE HEAD WITHOUT CONTRAST  10/24/2022 12:41 pm TECHNIQUE: CT of the head was performed without the administration of intravenous contrast. Automated exposure control, iterative reconstruction, and/or weight based adjustment of the mA/kV was utilized to reduce the radiation dose to as low as reasonably achievable. COMPARISON: 04/18/2022 HISTORY: ORDERING SYSTEM PROVIDED HISTORY: HEAD TRAUMA, CLOSED, MILD, GCS >= 13, NO RISK FACTORS, NEURO EXAM NORMAL TECHNOLOGIST PROVIDED HISTORY: Has a \"code stroke\" or \"stroke alert\" been called? ->No Reason for exam:->slurred speech Decision Support Exception - unselect if not a suspected or confirmed emergency medical condition->Emergency Medical Condition (MA) Reason for Exam: slurred speech, Head trauma, closed, mild, GCS >= 13, no risk factors, neuro exam normal FINDINGS: BRAIN/VENTRICLES: There is no acute intracranial hemorrhage, mass effect or midline shift. No abnormal extra-axial fluid collection. The gray-white differentiation is maintained without evidence of an acute infarct. There is no evidence of hydrocephalus. Mild chronic microvascular ischemic change. ORBITS: The visualized portion of the orbits demonstrate no acute abnormality. SINUSES: The visualized paranasal sinuses and mastoid air cells demonstrate no acute abnormality. SOFT TISSUES/SKULL:  No acute abnormality of the visualized skull or soft tissues. No acute intracranial abnormality. XR CHEST PORTABLE    Result Date: 10/24/2022  EXAMINATION: ONE XRAY VIEW OF THE CHEST 10/24/2022 11:41 am COMPARISON: 09/10/2022.  HISTORY: ORDERING SYSTEM PROVIDED HISTORY: stroke symptoms TECHNOLOGIST PROVIDED HISTORY: Reason for exam:->stroke symptoms Reason for Exam: STROKE SYMPTOMS FINDINGS: The heart size is within normal limits. The pulmonary vasculature is also within normal limits. No acute infiltrates are seen. The costophrenic angles are sharp bilaterally. No pneumothoraces are noted. There has been removal of the right central venous line. 1. No active pulmonary disease. CT CHEST ABDOMEN PELVIS W CONTRAST Additional Contrast? None    Result Date: 10/24/2022  EXAMINATION: CT OF THE CHEST, ABDOMEN, AND PELVIS WITH CONTRAST 10/24/2022 5:51 pm TECHNIQUE: CT of the chest, abdomen and pelvis was performed with the administration of intravenous contrast. Multiplanar reformatted images are provided for review. Automated exposure control, iterative reconstruction, and/or weight based adjustment of the mA/kV was utilized to reduce the radiation dose to as low as reasonably achievable. COMPARISON: CT abdomen and pelvis 09/10/2022 HISTORY: ORDERING SYSTEM PROVIDED HISTORY: etiology of sepsis TECHNOLOGIST PROVIDED HISTORY: Reason for exam:->etiology of sepsis Additional Contrast?->None Reason for Exam: etiology of sepsis Additional signs and symptoms: no Relevant Medical/Surgical History: 80 ml isovue 370 used FINDINGS: Chest: Mediastinum: No enlarged lymphadenopathy. Small lymph nodes are scattered. Small fixed hiatus hernia. Minimal wall thickening of the esophagus. Heart: Heart size is normal. No pericardial effusion. Great vessels: the great vessels are patent and unremarkable. Aorta: Aorta is patent and unremarkable. Negative for aneurysm or dissection. Lungs/pleura: Minimal opacities in the lower lobes. Trachea and bronchi are patent. Minimal paraseptal emphysema. No consolidation. Soft Tissues: No enlarged axillary adenopathy. No subcutaneous mass. Thyroid is unremarkable. Appearance of the line in the left arm.   However, the tip is in the distal subclavian vein. Osseous: No suspicious lytic or sclerotic lesions. Negative for pneumonia. Mild bibasilar opacities which are most likely atelectasis. Small fixed hiatus hernia. Abdomen/Pelvis: Liver: Liver is normal density. No enhancing masses. Normal enhancement of the intrahepatic vasculature. Spleen:  Normal size. No enhancing masses calcified granulomata in the spleen. Pancreas: No enhancing masses. No ductal dilation. No adjacent fatty stranding. Gallbladder no calcified stones or sludge. No pericholecystic fluid. No wall thickening. Bile ducts: No biliary ductal dilation Adrenals: The adrenal glands are unremarkable Kidneys: Urinary tract stent on the left. The left kidney is mildly atrophic. Perinephric stranding on the left and mild decreased enhancement of the cortex when compared to the right. However, no hydronephrosis. No large mass. Heterogeneity of the contrast enhancement on the left. Left urinary tract stent extends into the bladder. No stones along the pathway of the left ureter. GI: No small bowel dilation. No colonic wall thickening. No large mass. The stomach is unremarkable in appearance but it is underdistended. Moderate amount of stool throughout the colon. Mesentery: No enlarged lymphadenopathy. No free fluid. No free gas. Aorta: Aorta is of normal size. Negative for dissection. IVC is unremarkable. Celiac axis and SMA are patent. Portal vein is patent. Atherosclerotic change of the aorta in the renal arteries. PELVIS GI: No small bowel dilation. No colonic wall thickening. No enlarged lymphadenopathy. Small amount of free fluid in the pelvis. : The bladder is unremarkable in appearance. No large mass. Suprapubic catheter. The bladder is completely decompressed. Prostate appears to have been resected. Phleboliths in the pelvis. Multiple clips in the pelvis. No enlarged lymphadenopathy. Osseous: Bone island in the right ilium.   No lytic destructive lesions. Bone island in the right acetabulum also unchanged. IMPRESSION: 1. Left urinary tract stent is in place. Probable pyelonephritis of the left kidney. Assessment & Plan:      Yuki Joyner is a 78 y.o. male admitted 10/24/2022 for AMS, hypoglycemia, UTI     1) Urinary tract infection: Prior blood culture 8/30 ESBL E. coli, repeat blood culture 9/9 Candida. Was on Fetroja and fluconazole per ID. Urine culture 10/9 ESBL E. coli, on ertapenem as outpatient completed 10/23. UA showing moderate leuks and large blood but negative nitrites and no bacteria seen on microscopy. Urine culture no growth. Blood cultures 1/4 gram-positive cocci. Afebrile. No leukocytosis. On meropenem  2) Chronic urine retention: Managed by suprapubic catheter. 12 [place] SPT exchange at the bedside 10/25  3) Chronic left hydronephrosis: Managed by ureteral stent exchange every 3 months. Recent exchange earlier this month was rescheduled due to ongoing anemia. Cystoscopy/left stent exchange possibly as inpatient     Patient seen and examined, chart reviewed.    Electronically signed by PAWEL Linder on 10/26/2022 at 9:31 AM

## 2022-10-27 LAB
CULTURE: ABNORMAL
CULTURE: ABNORMAL
GLUCOSE BLD-MCNC: 121 MG/DL (ref 70–99)
GLUCOSE BLD-MCNC: 154 MG/DL (ref 70–99)
GLUCOSE BLD-MCNC: 189 MG/DL (ref 70–99)
GLUCOSE BLD-MCNC: 50 MG/DL (ref 70–99)
GLUCOSE BLD-MCNC: 70 MG/DL (ref 70–99)
GLUCOSE BLD-MCNC: 75 MG/DL (ref 70–99)
GLUCOSE BLD-MCNC: 89 MG/DL (ref 70–99)
Lab: ABNORMAL
SPECIMEN: ABNORMAL

## 2022-10-27 PROCEDURE — 97535 SELF CARE MNGMENT TRAINING: CPT

## 2022-10-27 PROCEDURE — 1200000000 HC SEMI PRIVATE

## 2022-10-27 PROCEDURE — 6370000000 HC RX 637 (ALT 250 FOR IP): Performed by: PHYSICIAN ASSISTANT

## 2022-10-27 PROCEDURE — 6360000002 HC RX W HCPCS: Performed by: INTERNAL MEDICINE

## 2022-10-27 PROCEDURE — 97530 THERAPEUTIC ACTIVITIES: CPT

## 2022-10-27 PROCEDURE — 6370000000 HC RX 637 (ALT 250 FOR IP): Performed by: NURSE PRACTITIONER

## 2022-10-27 PROCEDURE — 2500000003 HC RX 250 WO HCPCS: Performed by: INTERNAL MEDICINE

## 2022-10-27 PROCEDURE — 94761 N-INVAS EAR/PLS OXIMETRY MLT: CPT

## 2022-10-27 PROCEDURE — 2580000003 HC RX 258: Performed by: PHYSICIAN ASSISTANT

## 2022-10-27 PROCEDURE — 2580000003 HC RX 258: Performed by: STUDENT IN AN ORGANIZED HEALTH CARE EDUCATION/TRAINING PROGRAM

## 2022-10-27 PROCEDURE — 2580000003 HC RX 258: Performed by: INTERNAL MEDICINE

## 2022-10-27 PROCEDURE — 6360000002 HC RX W HCPCS: Performed by: PHYSICIAN ASSISTANT

## 2022-10-27 PROCEDURE — 82962 GLUCOSE BLOOD TEST: CPT

## 2022-10-27 RX ORDER — INSULIN GLARGINE 100 [IU]/ML
15 INJECTION, SOLUTION SUBCUTANEOUS NIGHTLY
Status: DISCONTINUED | OUTPATIENT
Start: 2022-10-27 | End: 2022-10-27

## 2022-10-27 RX ORDER — DEXTROSE MONOHYDRATE 100 MG/ML
INJECTION, SOLUTION INTRAVENOUS CONTINUOUS
Status: ACTIVE | OUTPATIENT
Start: 2022-10-27 | End: 2022-10-27

## 2022-10-27 RX ADMIN — ASPIRIN 81 MG CHEWABLE TABLET 81 MG: 81 TABLET CHEWABLE at 22:03

## 2022-10-27 RX ADMIN — CARVEDILOL 6.25 MG: 6.25 TABLET, FILM COATED ORAL at 09:55

## 2022-10-27 RX ADMIN — DEXTROSE MONOHYDRATE: 100 INJECTION, SOLUTION INTRAVENOUS at 10:03

## 2022-10-27 RX ADMIN — CARVEDILOL 6.25 MG: 6.25 TABLET, FILM COATED ORAL at 17:34

## 2022-10-27 RX ADMIN — LEVOTHYROXINE SODIUM 75 MCG: 0.07 TABLET ORAL at 05:04

## 2022-10-27 RX ADMIN — SODIUM CHLORIDE, PRESERVATIVE FREE 10 ML: 5 INJECTION INTRAVENOUS at 22:04

## 2022-10-27 RX ADMIN — MEROPENEM 1000 MG: 1 INJECTION, POWDER, FOR SOLUTION INTRAVENOUS at 10:02

## 2022-10-27 RX ADMIN — SODIUM CHLORIDE, PRESERVATIVE FREE 10 ML: 5 INJECTION INTRAVENOUS at 09:55

## 2022-10-27 RX ADMIN — GLUCAGON HYDROCHLORIDE 1 MG: KIT at 07:57

## 2022-10-27 RX ADMIN — ENOXAPARIN SODIUM 40 MG: 40 INJECTION SUBCUTANEOUS at 09:56

## 2022-10-27 RX ADMIN — MEROPENEM 1000 MG: 1 INJECTION, POWDER, FOR SOLUTION INTRAVENOUS at 22:02

## 2022-10-27 RX ADMIN — PANTOPRAZOLE SODIUM 40 MG: 40 TABLET, DELAYED RELEASE ORAL at 05:05

## 2022-10-27 RX ADMIN — SODIUM CHLORIDE 5 ML/HR: 9 INJECTION, SOLUTION INTRAVENOUS at 21:56

## 2022-10-27 RX ADMIN — FERROUS SULFATE TAB 325 MG (65 MG ELEMENTAL FE) 325 MG: 325 (65 FE) TAB at 09:55

## 2022-10-27 ASSESSMENT — PAIN SCALES - GENERAL
PAINLEVEL_OUTOF10: 0
PAINLEVEL_OUTOF10: 0
PAINLEVEL_OUTOF10: 5

## 2022-10-27 NOTE — PROGRESS NOTES
Occupational Therapy    Occupational Therapy Treatment Note    Name: Claudeen Bare MRN: [id number] :   1940   Date:  10/27/2022   Admission Date: 10/24/2022 Room:  [facility]     Primary Problem:  The primary encounter diagnosis was Hypoglycemia. A diagnosis of Hypothermia, initial encounter was also pertinent to this visit. Pt  has a past medical history of Acute urinary tract infection, Ataxia, Diabetes mellitus ([address] Fusion of spine of cervical region, Gait disturbance, History of prostate cancer, History of tobacco use, Hyperlipidemia, and Osteoarthritis. Restrictions/Precautions:         general precautions, fall risk, contact precautions    Communication with other providers: RN [place] approved session, DION Garnett for safety/assist.     Subjective:  Patient states:  \"Just take me to the bank so I can get some money. \" Pt confused throughout session. Pain:   Location, Type, Intensity (0/10 to 10/10):  denies     Objective:    Observation: pt presented in semi-streeter's with poor positioning, pt flexed and slid down in bed. Objective Measures:  Tele, External Catheter, Suprapubic Hopper Catheter with thermometer. Treatment, including education:  Therapeutic Activity Training:   Therapeutic activity training was instructed today. Cues were given for safety, sequence, UE/LE placement, awareness, and balance. Activities performed today included bed mobility training, sup-sit, sit-stand, SPT. Rolling R/L with Max A X1-2 with max cues for UE placement. Supine to sit with Max A X2 with max cues for initiation/technique. Pt required Max A X2 initially for sitting EOB and progressed to Max A X1 d/t retropulsion throughout with v/c's to correct. SPT from EOB <> recliner with Max A X2 with cues for upright posture d/t pt demo flexed trunk throughout. Pt required Max A X2 for STS transfer from recliner with max cues for technique. Max A X2 to scoot back/reposition in chair. Self Care Training:   Cues were given for safety, sequence, UE/LE placement, visual cues, and balance. Pt required Mod A for grooming task of brushing hair while seated in recliner. Pt required SBA with max v/c's for initiation/thoroughness to wash hands and face with wash cloth. Pt required increased time to complete. Activities performed today included bed mobility training, transfers to increase strength, activity tolerance to facilitate IND c ADL tasks, func transfers / mobility with emphasis on safety awareness. Pt left with chair alarm on, call light within reach, breakfast tray set up, and nursing made aware of pt up in chair with matheus pad under pt. Assessment / Impression:    Patient's tolerance of treatment: Fair  Adverse Reaction: None  Significant change in status and impact: none   Barriers to improvement: Safety, activity tolerance, strength, balance       Plan for Next Session:    Cont OT POC    Time in:  0845  Time out:  0925  Timed treatment minutes:  40  Total treatment time:  40      Electronically signed by:     KHARI Tinajero,   10/27/2022, 9:30 AM

## 2022-10-27 NOTE — PROGRESS NOTES
0745 bg 50, pt received lantus at hs, no parameters on the lantus dose. Perfectserved Dr. Adam Paul to update. Pt medicated as ordered.

## 2022-10-27 NOTE — PROGRESS NOTES
Physical Therapy    Physical Therapy Treatment Note  Name: Natalia Virk MRN: [id number] :   1940   Date:  10/27/2022   Admission Date: 10/24/2022 Room:  [address] [place] [address]   Restrictions/Precautions:          Fall risk, mild confusion, suprapubic catheter with temp. Monitor and pt has external catheters , tele, IV, fall risk, Contact  diagnosis was Hypoglycemia  Communication with other providers:  per nurse Mikael Suárez ok to tx and was notified at end of tx that pt should be matheus back to bed for safety. Co-tx with Martina BALBUENA  Subjective:  Patient states:  pt needs encouragement to participate in tx session but then agreeable to tx. Pain:   Location, Type, Intensity (0/10 to 10/10):  no c/o during tx session  Objective:    Observation:  alert but confused and had slid down in bed and with legs in hooklying and rotated to left. Treatment, including education/measures:  Sup tto side to sit max assist of 2  Pt sat EOB max assist of 2 but progressed to max assist of one and cues. Pt tends to have posterior lean while in sitting . SPT bed to chair moving to right side max/dependent of 2. Pt needing to stand once in chair to ajdust lines and was able to come to half stand with max/dependent of 2. Pt needing max assist of 2 and cues to scoot back in chair   Safety  Patient left safely in the chair with leg rest elevated, with call light/phone in reach with alarm applied. Gait belt was used for transfers and gait. Assessment / Impression:      Patient's tolerance of treatment:  good   Adverse Reaction: na  Significant change in status and impact:  na  Barriers to improvement:  confused, strength, and safety  Plan for Next Session:    Cont.  POC  Time in:  845  Time out:  925  Timed treatment minutes:  40  Total treatment time:  40    Previously filed items:           Short Term Goals  Time Frame for Short Term Goals: 1 week  Short Term Goal 1: Pt will perform sup>sit with Min A and cues  Short Term Goal 2: Pt will perform standing balance tasks x5' with UE support and Min A  Short Term Goal 3: Pt will AMB x15 ft with RW, chair follow, Mod A    Electronically signed by:    Nancy Rapp PTA  10/27/2022, 8:30 AM

## 2022-10-27 NOTE — PROGRESS NOTES
V2.0  [facility] [place]      Name:  Georgia Mueller /Age/Sex: 1940  (78 y.o. male)   MRN & CSN:  [id number] & [id number] Encounter Date/Time: 10/27/2022 2:03 PM EDT    Location:  -A PCP: Jane Patino MD       Hospital Day: 4    Assessment and Plan:   Georgia Mueller is a 78 y.o. male  who presents with Hypoglycemia      Plan:  Acute severe and refractory hypoglycemia, can be secondary to decreased p.o. intake, 22 units Lantus was resumed yesterday, as patient's blood glucose was at goal received 12 units at night, blood glucose less than 70 in a.m. did start him again on D10 at 50 cc an hour for 4 hours. Hypoglycemia protocol orders added. Acute metabolic encephalopathy S/T severe and refractory hypoglycemia, improving  CT head no acute process  CT chest, abdomen, pelvis ordered showed probable pyelonephritis of L kidney, currently on meropenm, plan for cystoscopy/left ureteral stent exchange by urology. Elevated troponin  trop T 0.044, chronically elevated in setting of CKD, EKG with sinus bradycardia, no acute ST T wave changes    Chronic anemia - Hgb 8.0, near baseline   CKDIII Cr 2.0, near baseline avoid nephrotoxins     Diet ADULT DIET; Regular; 5 carb choices (75 gm/meal)   DVT Prophylaxis [x] Lovenox, []  Heparin, [] SCDs, [] Ambulation,  [] Eliquis, [] Xarelto  [] Coumadin   Code Status Full Code   Disposition From: SNF  Expected Disposition: SNF  Estimated Date of Discharge: 3 days  Patient requires continued admission due to    Surrogate Decision Maker/ POA      Subjective:     Chief Complaint: Hypoglycemia (39 per ems upon arrival. Glucagon given at nursing home and D10 given per medics)       Georgia Mueller is a 78 y.o. male who presents with hypoglycemia. Alert and awake this morning, follows commands, feels slightly confused, denies any lightheadedness, dizziness or sweating.   Complains of left flank soreness but he states that is something chronic. Denies any headache, nausea, vomiting, chest pain, shortness of breath, abdominal pain, palpitations, urinary or bowel changes. 10 point ROS negative except as noted above. Review of Systems:    Review of Systems  Unable to obtain ROS due to mentation. Objective: Intake/Output Summary (Last 24 hours) at 10/27/2022 1306  Last data filed at 10/27/2022 0000  Gross per 24 hour   Intake --   Output 600 ml   Net -600 ml          Vitals:   Vitals:    10/27/22 1216   BP:    Pulse: 64   Resp:    Temp:    SpO2:        Physical Exam:   Physical Exam   GEN    patient is drowsy, opens eyes to voice commands, appears stated age, not in respiratory distress  EYES   Pupils are equally round. No scleral erythema, discharge, or conjunctivitis. HENT  Mucous membranes are moist.  NECK  Supple, no apparent thyromegaly or masses. RESP  Clear to auscultation, no wheezes, rales or rhonchi. Respirations even and unlabored on RA. CARDIO/VASC   S1/S2 auscultated. Regular rate without appreciable murmurs, rubs, or gallops. Peripheral pulses equal bilaterally and palpable. No peripheral edema. GI        Abdomen is soft without significant tenderness, masses, or guarding. Suprapubic catheter noted, site looks clean and dry.        No costovertebral angle tenderness. MSK    No gross joint deformities. SKIN    Normal coloration, warm, dry.   NEURO: Awake and alert, follows commands alert and oriented to time and person PSYCH    mood appears normal  Medications:   Medications:    insulin glargine  15 Units SubCUTAneous Nightly    insulin lispro  0-16 Units SubCUTAneous Q4H    insulin lispro  0-4 Units SubCUTAneous Nightly    pantoprazole  40 mg Oral QAM AC    sodium chloride flush  5-40 mL IntraVENous 2 times per day    enoxaparin  40 mg SubCUTAneous Daily    dextrose bolus  250 mL IntraVENous Once    carvedilol  6.25 mg Oral BID WC    ferrous sulfate  325 mg Oral Daily with breakfast    torsemide  20 mg Oral Once per day on Mon Wed Fri    levothyroxine  75 mcg Oral Daily    aspirin  81 mg Oral Nightly    meropenem  1,000 mg IntraVENous Q12H      Infusions:    dextrose 50 mL/hr at 10/27/22 1003    sodium chloride      dextrose       PRN Meds: dextrose bolus, 125 mL, PRN   Or  dextrose bolus, 250 mL, PRN  sodium chloride flush, 5-40 mL, PRN  sodium chloride, , PRN  ondansetron, 4 mg, Q8H PRN   Or  ondansetron, 4 mg, Q6H PRN  polyethylene glycol, 17 g, Daily PRN  acetaminophen, 650 mg, Q6H PRN   Or  acetaminophen, 650 mg, Q6H PRN  glucose, 4 tablet, PRN  dextrose bolus, 125 mL, PRN   Or  dextrose bolus, 250 mL, PRN  glucagon (rDNA), 1 mg, PRN  dextrose, , Continuous PRN      Labs      Recent Results (from the past 24 hour(s))   POCT Glucose    Collection Time: 10/26/22  1:20 PM   Result Value Ref Range    POC Glucose 283 (H) 70 - 99 MG/DL   POCT Glucose    Collection Time: 10/26/22  4:28 PM   Result Value Ref Range    POC Glucose 189 (H) 70 - 99 MG/DL   POCT Glucose    Collection Time: 10/26/22  9:46 PM   Result Value Ref Range    POC Glucose 138 (H) 70 - 99 MG/DL   POCT Glucose    Collection Time: 10/27/22 12:18 AM   Result Value Ref Range    POC Glucose 189 (H) 70 - 99 MG/DL   POCT Glucose    Collection Time: 10/27/22  3:56 AM   Result Value Ref Range    POC Glucose 121 (H) 70 - 99 MG/DL   POCT Glucose    Collection Time: 10/27/22  7:46 AM   Result Value Ref Range    POC Glucose 50 (L) 70 - 99 MG/DL   POCT Glucose    Collection Time: 10/27/22  8:15 AM   Result Value Ref Range    POC Glucose 70 70 - 99 MG/DL   POCT Glucose    Collection Time: 10/27/22 12:22 PM   Result Value Ref Range    POC Glucose 154 (H) 70 - 99 MG/DL        Imaging/Diagnostics Last 24 Hours   CT HEAD WO CONTRAST    Result Date: 10/24/2022  EXAMINATION: CT OF THE HEAD WITHOUT CONTRAST  10/24/2022 12:41 pm TECHNIQUE: CT of the head was performed without the administration of intravenous contrast. Automated exposure control, iterative reconstruction, and/or weight based adjustment of the mA/kV was utilized to reduce the radiation dose to as low as reasonably achievable. COMPARISON: 04/18/2022 HISTORY: ORDERING SYSTEM PROVIDED HISTORY: HEAD TRAUMA, CLOSED, MILD, GCS >= 13, NO RISK FACTORS, NEURO EXAM NORMAL TECHNOLOGIST PROVIDED HISTORY: Has a \"code stroke\" or \"stroke alert\" been called? ->No Reason for exam:->slurred speech Decision Support Exception - unselect if not a suspected or confirmed emergency medical condition->Emergency Medical Condition (MA) Reason for Exam: slurred speech, Head trauma, closed, mild, GCS >= 13, no risk factors, neuro exam normal FINDINGS: BRAIN/VENTRICLES: There is no acute intracranial hemorrhage, mass effect or midline shift. No abnormal extra-axial fluid collection. The gray-white differentiation is maintained without evidence of an acute infarct. There is no evidence of hydrocephalus. Mild chronic microvascular ischemic change. ORBITS: The visualized portion of the orbits demonstrate no acute abnormality. SINUSES: The visualized paranasal sinuses and mastoid air cells demonstrate no acute abnormality. SOFT TISSUES/SKULL:  No acute abnormality of the visualized skull or soft tissues. No acute intracranial abnormality. XR CHEST PORTABLE    Result Date: 10/24/2022  EXAMINATION: ONE XRAY VIEW OF THE CHEST 10/24/2022 11:41 am COMPARISON: 09/10/2022. HISTORY: ORDERING SYSTEM PROVIDED HISTORY: stroke symptoms TECHNOLOGIST PROVIDED HISTORY: Reason for exam:->stroke symptoms Reason for Exam: STROKE SYMPTOMS FINDINGS: The heart size is within normal limits. The pulmonary vasculature is also within normal limits. No acute infiltrates are seen. The costophrenic angles are sharp bilaterally. No pneumothoraces are noted. There has been removal of the right central venous line. 1. No active pulmonary disease.      CT CHEST ABDOMEN PELVIS W CONTRAST Additional Contrast? None    Result Date: 10/24/2022  EXAMINATION: CT OF THE CHEST, ABDOMEN, AND PELVIS WITH CONTRAST 10/24/2022 5:51 pm TECHNIQUE: CT of the chest, abdomen and pelvis was performed with the administration of intravenous contrast. Multiplanar reformatted images are provided for review. Automated exposure control, iterative reconstruction, and/or weight based adjustment of the mA/kV was utilized to reduce the radiation dose to as low as reasonably achievable. COMPARISON: CT abdomen and pelvis 09/10/2022 HISTORY: ORDERING SYSTEM PROVIDED HISTORY: etiology of sepsis TECHNOLOGIST PROVIDED HISTORY: Reason for exam:->etiology of sepsis Additional Contrast?->None Reason for Exam: etiology of sepsis Additional signs and symptoms: no Relevant Medical/Surgical History: 80 ml isovue 370 used FINDINGS: Chest: Mediastinum: No enlarged lymphadenopathy. Small lymph nodes are scattered. Small fixed hiatus hernia. Minimal wall thickening of the esophagus. Heart: Heart size is normal. No pericardial effusion. Great vessels: the great vessels are patent and unremarkable. Aorta: Aorta is patent and unremarkable. Negative for aneurysm or dissection. Lungs/pleura: Minimal opacities in the lower lobes. Trachea and bronchi are patent. Minimal paraseptal emphysema. No consolidation. Soft Tissues: No enlarged axillary adenopathy. No subcutaneous mass. Thyroid is unremarkable. Appearance of the line in the left arm. However, the tip is in the distal subclavian vein. Osseous: No suspicious lytic or sclerotic lesions. Negative for pneumonia. Mild bibasilar opacities which are most likely atelectasis. Small fixed hiatus hernia. Abdomen/Pelvis: Liver: Liver is normal density. No enhancing masses. Normal enhancement of the intrahepatic vasculature. Spleen:  Normal size. No enhancing masses calcified granulomata in the spleen. Pancreas: No enhancing masses. No ductal dilation. No adjacent fatty stranding. Gallbladder no calcified stones or sludge. No pericholecystic fluid. No wall thickening. Bile ducts: No biliary ductal dilation Adrenals: The adrenal glands are unremarkable Kidneys: Urinary tract stent on the left. The left kidney is mildly atrophic. Perinephric stranding on the left and mild decreased enhancement of the cortex when compared to the right. However, no hydronephrosis. No large mass. Heterogeneity of the contrast enhancement on the left. Left urinary tract stent extends into the bladder. No stones along the pathway of the left ureter. GI: No small bowel dilation. No colonic wall thickening. No large mass. The stomach is unremarkable in appearance but it is underdistended. Moderate amount of stool throughout the colon. Mesentery: No enlarged lymphadenopathy. No free fluid. No free gas. Aorta: Aorta is of normal size. Negative for dissection. IVC is unremarkable. Celiac axis and SMA are patent. Portal vein is patent. Atherosclerotic change of the aorta in the renal arteries. PELVIS GI: No small bowel dilation. No colonic wall thickening. No enlarged lymphadenopathy. Small amount of free fluid in the pelvis. : The bladder is unremarkable in appearance. No large mass. Suprapubic catheter. The bladder is completely decompressed. Prostate appears to have been resected. Phleboliths in the pelvis. Multiple clips in the pelvis. No enlarged lymphadenopathy. Osseous: Bone island in the right ilium. No lytic destructive lesions. Bone island in the right acetabulum also unchanged. IMPRESSION: 1. Left urinary tract stent is in place. Probable pyelonephritis of the left kidney.        Electronically signed by Terryl Koyanagi, MD on 10/27/2022 at 1:06 PM

## 2022-10-27 NOTE — PROGRESS NOTES
[facility] [address]   Progress Note  [facility] 0 1 2  Date: 10/27/2022   Patient: Yuki Joyner   : 1940   DOA: 10/24/2022   MRN: [id number]   ROOM#: 2125/2125-A     Admit Date: 10/24/2022     Collaborating Urologist on Call at time of admission: Dr. Edgar Calvillo    CC: AMS    Subjective:     Pain: none, no nausea and no vomiting,   Bowel Movement/Flatus:   Yes  Voiding:  suprapubic catheter with clear light orange urine,     Patient more confused today. Family at bedside. Objective:      Vitals:    BP (!) 119/52   Pulse 67   Temp 97.8 °F (36.6 °C) (Oral)   Resp 10   Ht 5' 8\" (1.727 m)   Wt 213 lb 10 oz (96.9 kg)   SpO2 97%   BMI 32.48 kg/m²    Temp  Av.2 °F (36.8 °C)  Min: 97.5 °F (36.4 °C)  Max: 98.5 °F (36.9 °C)     Intake/Output Summary (Last 24 hours) at 10/27/2022 1018  Last data filed at 10/27/2022 0000  Gross per 24 hour   Intake --   Output 600 ml   Net -600 ml       Physical Exam:   General appearance: alert, appears stated age, cooperative, confused, fatigued, and no distress  Head: Normocephalic, without obvious abnormality, atraumatic  Back:  No CVA tenderness  Abdomen: Soft, nontender, nondistended. 16 French SPT with clear yellow urine.     Labs:   WBC:    Lab Results   Component Value Date/Time    WBC 3.5 10/26/2022 04:30 AM      Hemoglobin/Hematocrit:    Lab Results   Component Value Date/Time    HGB 8.0 10/26/2022 04:30 AM    HCT 27.1 10/26/2022 04:30 AM      BMP:   Lab Results   Component Value Date/Time     10/26/2022 04:30 AM    K 4.6 10/26/2022 04:30 AM    K 3.9 2018 04:42 AM     10/26/2022 04:30 AM    CO2 20 10/26/2022 04:30 AM    BUN 44 10/26/2022 04:30 AM    LABALBU 3.4 10/25/2022 05:39 AM    CREATININE 2.1 10/26/2022 04:30 AM    CALCIUM 8.7 10/26/2022 04:30 AM    GFRAA 31 10/13/2022 06:30 AM    LABGLOM 30 10/26/2022 04:30 AM      PT/INR:    Lab Results   Component Value Date/Time    PROTIME 14.0 10/24/2022 12:20 PM    PROTIME 15.2 01/24/2011 06:48 PM    INR 1.08 10/24/2022 12:20 PM      PTT:    Lab Results   Component Value Date/Time    APTT 37.6 09/12/2022 01:24 PM      Blood Culture:  Streptococcus species   Urine Culture:  No growth    Imaging:   CT HEAD WO CONTRAST    Result Date: 10/24/2022  EXAMINATION: CT OF THE HEAD WITHOUT CONTRAST  10/24/2022 12:41 pm TECHNIQUE: CT of the head was performed without the administration of intravenous contrast. Automated exposure control, iterative reconstruction, and/or weight based adjustment of the mA/kV was utilized to reduce the radiation dose to as low as reasonably achievable. COMPARISON: 04/18/2022 HISTORY: ORDERING SYSTEM PROVIDED HISTORY: HEAD TRAUMA, CLOSED, MILD, GCS >= 13, NO RISK FACTORS, NEURO EXAM NORMAL TECHNOLOGIST PROVIDED HISTORY: Has a \"code stroke\" or \"stroke alert\" been called? ->No Reason for exam:->slurred speech Decision Support Exception - unselect if not a suspected or confirmed emergency medical condition->Emergency Medical Condition (MA) Reason for Exam: slurred speech, Head trauma, closed, mild, GCS >= 13, no risk factors, neuro exam normal FINDINGS: BRAIN/VENTRICLES: There is no acute intracranial hemorrhage, mass effect or midline shift. No abnormal extra-axial fluid collection. The gray-white differentiation is maintained without evidence of an acute infarct. There is no evidence of hydrocephalus. Mild chronic microvascular ischemic change. ORBITS: The visualized portion of the orbits demonstrate no acute abnormality. SINUSES: The visualized paranasal sinuses and mastoid air cells demonstrate no acute abnormality. SOFT TISSUES/SKULL:  No acute abnormality of the visualized skull or soft tissues. No acute intracranial abnormality. XR CHEST PORTABLE    Result Date: 10/24/2022  EXAMINATION: ONE XRAY VIEW OF THE CHEST 10/24/2022 11:41 am COMPARISON: 09/10/2022.  HISTORY: ORDERING SYSTEM PROVIDED HISTORY: stroke symptoms TECHNOLOGIST PROVIDED HISTORY: Reason for exam:->stroke symptoms Reason for Exam: STROKE SYMPTOMS FINDINGS: The heart size is within normal limits. The pulmonary vasculature is also within normal limits. No acute infiltrates are seen. The costophrenic angles are sharp bilaterally. No pneumothoraces are noted. There has been removal of the right central venous line. 1. No active pulmonary disease. CT CHEST ABDOMEN PELVIS W CONTRAST Additional Contrast? None    Result Date: 10/24/2022  EXAMINATION: CT OF THE CHEST, ABDOMEN, AND PELVIS WITH CONTRAST 10/24/2022 5:51 pm TECHNIQUE: CT of the chest, abdomen and pelvis was performed with the administration of intravenous contrast. Multiplanar reformatted images are provided for review. Automated exposure control, iterative reconstruction, and/or weight based adjustment of the mA/kV was utilized to reduce the radiation dose to as low as reasonably achievable. COMPARISON: CT abdomen and pelvis 09/10/2022 HISTORY: ORDERING SYSTEM PROVIDED HISTORY: etiology of sepsis TECHNOLOGIST PROVIDED HISTORY: Reason for exam:->etiology of sepsis Additional Contrast?->None Reason for Exam: etiology of sepsis Additional signs and symptoms: no Relevant Medical/Surgical History: 80 ml isovue 370 used FINDINGS: Chest: Mediastinum: No enlarged lymphadenopathy. Small lymph nodes are scattered. Small fixed hiatus hernia. Minimal wall thickening of the esophagus. Heart: Heart size is normal. No pericardial effusion. Great vessels: the great vessels are patent and unremarkable. Aorta: Aorta is patent and unremarkable. Negative for aneurysm or dissection. Lungs/pleura: Minimal opacities in the lower lobes. Trachea and bronchi are patent. Minimal paraseptal emphysema. No consolidation. Soft Tissues: No enlarged axillary adenopathy. No subcutaneous mass. Thyroid is unremarkable. Appearance of the line in the left arm. However, the tip is in the distal subclavian vein. Osseous: No suspicious lytic or sclerotic lesions. Negative for pneumonia. Mild bibasilar opacities which are most likely atelectasis. Small fixed hiatus hernia. Abdomen/Pelvis: Liver: Liver is normal density. No enhancing masses. Normal enhancement of the intrahepatic vasculature. Spleen:  Normal size. No enhancing masses calcified granulomata in the spleen. Pancreas: No enhancing masses. No ductal dilation. No adjacent fatty stranding. Gallbladder no calcified stones or sludge. No pericholecystic fluid. No wall thickening. Bile ducts: No biliary ductal dilation Adrenals: The adrenal glands are unremarkable Kidneys: Urinary tract stent on the left. The left kidney is mildly atrophic. Perinephric stranding on the left and mild decreased enhancement of the cortex when compared to the right. However, no hydronephrosis. No large mass. Heterogeneity of the contrast enhancement on the left. Left urinary tract stent extends into the bladder. No stones along the pathway of the left ureter. GI: No small bowel dilation. No colonic wall thickening. No large mass. The stomach is unremarkable in appearance but it is underdistended. Moderate amount of stool throughout the colon. Mesentery: No enlarged lymphadenopathy. No free fluid. No free gas. Aorta: Aorta is of normal size. Negative for dissection. IVC is unremarkable. Celiac axis and SMA are patent. Portal vein is patent. Atherosclerotic change of the aorta in the renal arteries. PELVIS GI: No small bowel dilation. No colonic wall thickening. No enlarged lymphadenopathy. Small amount of free fluid in the pelvis. : The bladder is unremarkable in appearance. No large mass. Suprapubic catheter. The bladder is completely decompressed. Prostate appears to have been resected. Phleboliths in the pelvis. Multiple clips in the pelvis. No enlarged lymphadenopathy. Osseous: Bone island in the right ilium. No lytic destructive lesions. Bone island in the right acetabulum also unchanged. IMPRESSION: 1. Left urinary tract stent is in place. Probable pyelonephritis of the left kidney. Assessment & Plan:      Sophia Russ is a 78 y.o. male admitted 10/24/2022 for  AMS, hypoglycemia, UTI     1) Urinary tract infection: Prior blood culture 8/30 ESBL E. coli, repeat blood culture 9/9 Candida. Was on Fetroja and fluconazole per ID. Urine culture 10/9 ESBL E. coli, on ertapenem as outpatient completed 10/23. UA showing moderate leuks and large blood but negative nitrites and no bacteria seen on microscopy. Urine culture no growth. Blood cultures positive Streptococcus    Afebrile. No leukocytosis. On meropenem  2) Chronic urine retention: Managed by suprapubic catheter. 12 Greek SPT exchange at the bedside 10/25  3) Chronic left hydronephrosis: Managed by ureteral stent exchange every 3 months. Recent exchange earlier this month was rescheduled due to ongoing anemia. Discussed with Dr. Mart Barriga who recommends cystoscopy/left stent exchange in the next few weeks after appropriate abx    Patient seen and examined, chart reviewed.    Electronically signed by PAWEL Ortega on 10/27/2022 at 10:18 AM

## 2022-10-28 PROBLEM — B95.2 BACTEREMIA DUE TO ENTEROCOCCUS: Status: ACTIVE | Noted: 2022-10-28

## 2022-10-28 PROBLEM — N12 PYELONEPHRITIS: Status: ACTIVE | Noted: 2022-10-28

## 2022-10-28 PROBLEM — R78.81 BACTEREMIA DUE TO ENTEROCOCCUS: Status: ACTIVE | Noted: 2022-10-28

## 2022-10-28 LAB
GLUCOSE BLD-MCNC: 112 MG/DL (ref 70–99)
GLUCOSE BLD-MCNC: 123 MG/DL (ref 70–99)
GLUCOSE BLD-MCNC: 26 MG/DL (ref 70–99)
GLUCOSE BLD-MCNC: 268 MG/DL (ref 70–99)
GLUCOSE BLD-MCNC: 85 MG/DL (ref 70–99)
GLUCOSE BLD-MCNC: 87 MG/DL (ref 70–99)
GLUCOSE BLD-MCNC: 93 MG/DL (ref 70–99)

## 2022-10-28 PROCEDURE — 6370000000 HC RX 637 (ALT 250 FOR IP): Performed by: PHYSICIAN ASSISTANT

## 2022-10-28 PROCEDURE — 2140000000 HC CCU INTERMEDIATE R&B

## 2022-10-28 PROCEDURE — 94761 N-INVAS EAR/PLS OXIMETRY MLT: CPT

## 2022-10-28 PROCEDURE — 82962 GLUCOSE BLOOD TEST: CPT

## 2022-10-28 PROCEDURE — 99223 1ST HOSP IP/OBS HIGH 75: CPT | Performed by: INTERNAL MEDICINE

## 2022-10-28 PROCEDURE — 2580000003 HC RX 258: Performed by: INTERNAL MEDICINE

## 2022-10-28 PROCEDURE — 2580000003 HC RX 258: Performed by: PHYSICIAN ASSISTANT

## 2022-10-28 PROCEDURE — 80048 BASIC METABOLIC PNL TOTAL CA: CPT

## 2022-10-28 PROCEDURE — 2580000003 HC RX 258: Performed by: NURSE PRACTITIONER

## 2022-10-28 PROCEDURE — 6360000002 HC RX W HCPCS: Performed by: PHYSICIAN ASSISTANT

## 2022-10-28 PROCEDURE — 6360000002 HC RX W HCPCS: Performed by: NURSE PRACTITIONER

## 2022-10-28 PROCEDURE — APPSS60 APP SPLIT SHARED TIME 46-60 MINUTES: Performed by: NURSE PRACTITIONER

## 2022-10-28 PROCEDURE — 6370000000 HC RX 637 (ALT 250 FOR IP): Performed by: NURSE PRACTITIONER

## 2022-10-28 RX ADMIN — TORSEMIDE 20 MG: 20 TABLET ORAL at 11:12

## 2022-10-28 RX ADMIN — INSULIN LISPRO 8 UNITS: 100 INJECTION, SOLUTION INTRAVENOUS; SUBCUTANEOUS at 17:41

## 2022-10-28 RX ADMIN — SODIUM CHLORIDE, PRESERVATIVE FREE 10 ML: 5 INJECTION INTRAVENOUS at 11:09

## 2022-10-28 RX ADMIN — DEXTROSE MONOHYDRATE 250 ML: 100 INJECTION, SOLUTION INTRAVENOUS at 23:58

## 2022-10-28 RX ADMIN — CARVEDILOL 6.25 MG: 6.25 TABLET, FILM COATED ORAL at 17:41

## 2022-10-28 RX ADMIN — CARVEDILOL 6.25 MG: 6.25 TABLET, FILM COATED ORAL at 10:39

## 2022-10-28 RX ADMIN — FERROUS SULFATE TAB 325 MG (65 MG ELEMENTAL FE) 325 MG: 325 (65 FE) TAB at 11:12

## 2022-10-28 RX ADMIN — SODIUM CHLORIDE, PRESERVATIVE FREE 10 ML: 5 INJECTION INTRAVENOUS at 20:12

## 2022-10-28 RX ADMIN — PANTOPRAZOLE SODIUM 40 MG: 40 TABLET, DELAYED RELEASE ORAL at 06:41

## 2022-10-28 RX ADMIN — AMPICILLIN SODIUM 1000 MG: 1 INJECTION, POWDER, FOR SOLUTION INTRAMUSCULAR; INTRAVENOUS at 23:52

## 2022-10-28 RX ADMIN — ENOXAPARIN SODIUM 40 MG: 40 INJECTION SUBCUTANEOUS at 11:12

## 2022-10-28 RX ADMIN — LEVOTHYROXINE SODIUM 75 MCG: 0.07 TABLET ORAL at 06:41

## 2022-10-28 RX ADMIN — AMPICILLIN SODIUM 1000 MG: 1 INJECTION, POWDER, FOR SOLUTION INTRAMUSCULAR; INTRAVENOUS at 20:16

## 2022-10-28 RX ADMIN — ASPIRIN 81 MG CHEWABLE TABLET 81 MG: 81 TABLET CHEWABLE at 20:13

## 2022-10-28 NOTE — PROGRESS NOTES
----- Message from Elvis Crowe sent at 4/17/2017  1:53 PM EDT -----  Regarding: CT ABD - AUTH DENIED  Jeff Angella - I submitted an auth request for Mr. Doretha Jung last week on the [employer]. Today I received a response that it was denied. I sent in information about his CA Dx history, but I didn't have a recent OV note, so they may be more interested in what is happening with him now. IF Dr. Alexys Schuster wishes to do a [id number], the number is:  [phone] Op. 3.    The case # is [id number]  for Sena Sommers  1961. He is scheduled in the morning (Tuesday). If we are not going to call today, let me know and I will cancel.      Thanks,  Coby Machuca 06:48 PM    INR 1.08 10/24/2022 12:20 PM      PTT:    Lab Results   Component Value Date/Time    APTT 37.6 09/12/2022 01:24 PM      Blood Culture:  Enterococcus raffinosus   Urine Culture:  No growth    Enterococcus raffinosus (2)  Antibiotic Interpretation Microscan Method Status    ampicillin Sensitive 8 BACTERIAL SUSCEPTIBILITY PANEL USMAN Final    vancomycin Sensitive 1 BACTERIAL SUSCEPTIBILITY PANEL USMAN Final    gentamicin-syn Sensitive <=500 BACTERIAL SUSCEPTIBILITY PANEL USMAN Final    streptomycin-syn Resistant >1000 BACTERIAL SUSCEPTIBILITY PANEL USMAN Final      Imaging:   CT HEAD WO CONTRAST    Result Date: 10/24/2022  EXAMINATION: CT OF THE HEAD WITHOUT CONTRAST  10/24/2022 12:41 pm TECHNIQUE: CT of the head was performed without the administration of intravenous contrast. Automated exposure control, iterative reconstruction, and/or weight based adjustment of the mA/kV was utilized to reduce the radiation dose to as low as reasonably achievable. COMPARISON: 04/18/2022 HISTORY: ORDERING SYSTEM PROVIDED HISTORY: HEAD TRAUMA, CLOSED, MILD, GCS >= 13, NO RISK FACTORS, NEURO EXAM NORMAL TECHNOLOGIST PROVIDED HISTORY: Has a \"code stroke\" or \"stroke alert\" been called? ->No Reason for exam:->slurred speech Decision Support Exception - unselect if not a suspected or confirmed emergency medical condition->Emergency Medical Condition (MA) Reason for Exam: slurred speech, Head trauma, closed, mild, GCS >= 13, no risk factors, neuro exam normal FINDINGS: BRAIN/VENTRICLES: There is no acute intracranial hemorrhage, mass effect or midline shift. No abnormal extra-axial fluid collection. The gray-white differentiation is maintained without evidence of an acute infarct. There is no evidence of hydrocephalus. Mild chronic microvascular ischemic change. ORBITS: The visualized portion of the orbits demonstrate no acute abnormality.  SINUSES: The visualized paranasal sinuses and mastoid air cells demonstrate no acute abnormality. SOFT TISSUES/SKULL:  No acute abnormality of the visualized skull or soft tissues. No acute intracranial abnormality. XR CHEST PORTABLE    Result Date: 10/24/2022  EXAMINATION: ONE XRAY VIEW OF THE CHEST 10/24/2022 11:41 am COMPARISON: 09/10/2022. HISTORY: ORDERING SYSTEM PROVIDED HISTORY: stroke symptoms TECHNOLOGIST PROVIDED HISTORY: Reason for exam:->stroke symptoms Reason for Exam: STROKE SYMPTOMS FINDINGS: The heart size is within normal limits. The pulmonary vasculature is also within normal limits. No acute infiltrates are seen. The costophrenic angles are sharp bilaterally. No pneumothoraces are noted. There has been removal of the right central venous line. 1. No active pulmonary disease. CT CHEST ABDOMEN PELVIS W CONTRAST Additional Contrast? None    Result Date: 10/24/2022  EXAMINATION: CT OF THE CHEST, ABDOMEN, AND PELVIS WITH CONTRAST 10/24/2022 5:51 pm TECHNIQUE: CT of the chest, abdomen and pelvis was performed with the administration of intravenous contrast. Multiplanar reformatted images are provided for review. Automated exposure control, iterative reconstruction, and/or weight based adjustment of the mA/kV was utilized to reduce the radiation dose to as low as reasonably achievable. COMPARISON: CT abdomen and pelvis 09/10/2022 HISTORY: ORDERING SYSTEM PROVIDED HISTORY: etiology of sepsis TECHNOLOGIST PROVIDED HISTORY: Reason for exam:->etiology of sepsis Additional Contrast?->None Reason for Exam: etiology of sepsis Additional signs and symptoms: no Relevant Medical/Surgical History: 80 ml isovue 370 used FINDINGS: Chest: Mediastinum: No enlarged lymphadenopathy. Small lymph nodes are scattered. Small fixed hiatus hernia. Minimal wall thickening of the esophagus. Heart: Heart size is normal. No pericardial effusion. Great vessels: the great vessels are patent and unremarkable. Aorta: Aorta is patent and unremarkable. Negative for aneurysm or dissection.  Lungs/pleura: Minimal opacities in the lower lobes. Trachea and bronchi are patent. Minimal paraseptal emphysema. No consolidation. Soft Tissues: No enlarged axillary adenopathy. No subcutaneous mass. Thyroid is unremarkable. Appearance of the line in the left arm. However, the tip is in the distal subclavian vein. Osseous: No suspicious lytic or sclerotic lesions. Negative for pneumonia. Mild bibasilar opacities which are most likely atelectasis. Small fixed hiatus hernia. Abdomen/Pelvis: Liver: Liver is normal density. No enhancing masses. Normal enhancement of the intrahepatic vasculature. Spleen:  Normal size. No enhancing masses calcified granulomata in the spleen. Pancreas: No enhancing masses. No ductal dilation. No adjacent fatty stranding. Gallbladder no calcified stones or sludge. No pericholecystic fluid. No wall thickening. Bile ducts: No biliary ductal dilation Adrenals: The adrenal glands are unremarkable Kidneys: Urinary tract stent on the left. The left kidney is mildly atrophic. Perinephric stranding on the left and mild decreased enhancement of the cortex when compared to the right. However, no hydronephrosis. No large mass. Heterogeneity of the contrast enhancement on the left. Left urinary tract stent extends into the bladder. No stones along the pathway of the left ureter. GI: No small bowel dilation. No colonic wall thickening. No large mass. The stomach is unremarkable in appearance but it is underdistended. Moderate amount of stool throughout the colon. Mesentery: No enlarged lymphadenopathy. No free fluid. No free gas. Aorta: Aorta is of normal size. Negative for dissection. IVC is unremarkable. Celiac axis and SMA are patent. Portal vein is patent. Atherosclerotic change of the aorta in the renal arteries. PELVIS GI: No small bowel dilation. No colonic wall thickening. No enlarged lymphadenopathy. Small amount of free fluid in the pelvis.  : The bladder is unremarkable in appearance. No large mass. Suprapubic catheter. The bladder is completely decompressed. Prostate appears to have been resected. Phleboliths in the pelvis. Multiple clips in the pelvis. No enlarged lymphadenopathy. Osseous: Bone island in the right ilium. No lytic destructive lesions. Bone island in the right acetabulum also unchanged. IMPRESSION: 1. Left urinary tract stent is in place. Probable pyelonephritis of the left kidney. Assessment & Plan:      Nhung Booker is a 78 y.o. male admitted 10/24/2022 for  AMS, hypoglycemia, UTI     1) Urinary tract infection: Prior blood culture 8/30 ESBL E. coli, repeat blood culture 9/9 Candida. Was on Fetroja and fluconazole per ID. Urine culture 10/9 ESBL E. coli, on ertapenem as outpatient completed 10/23. UA showing moderate leuks and large blood but negative nitrites and no bacteria seen on microscopy. Urine culture no growth. Blood cultures positive Enterococcus raffinosus. ID consulted. Afebrile. No leukocytosis. On ampicillin per C&S. 2) Chronic urine retention: Managed by suprapubic catheter. 12 [place]oc[place] SPT exchange at the bedside 10/25  3) Chronic left hydronephrosis: Managed by ureteral stent exchange every 3 months. Recent exchange earlier this month was rescheduled due to ongoing anemia. Discussed with Dr. Leopoldo Nestle who recommends cystoscopy/left stent exchange in the next few weeks after appropriate abx    Patient seen and examined, chart reviewed.    Electronically signed by PAWEL Sanchez on 10/28/2022 at 8:26 AM

## 2022-10-28 NOTE — PROGRESS NOTES
V2.0  [facility] Hospitalist Progress Note      Name:  Carlyle Odell /Age/Sex: 1940  (78 y.o. male)   MRN & CSN:  [id number] & [id number] Encounter Date/Time: 10/28/2022 2:03 PM EDT    Location:  [address] [place] [address] PCP: Orland Cogan, MD       Hospital Day: 5    Assessment and Plan:   Carlyle Odell is a 78 y.o. male  who presents with Hypoglycemia      Plan:  Acute severe and refractory hypoglycemia, can be secondary to decreased p.o. intake, blood sugars better in the last 24 hours. Acute metabolic encephalopathy S/T severe and refractory hypoglycemia, improving  CT head no acute process  CT chest, abdomen, pelvis ordered showed probable pyelonephritis of L kidney, currently on meropenm, will discontinue plan for cystoscopy/left ureteral stent exchange as outpatient    3. Enterococcus raffinosus bacteremia: 1 out of 4 bottles were positive for it, will start patient on ampicillin and consult ID.    4. Elevated troponin  trop T 0.044, chronically elevated in setting of CKD, EKG with sinus bradycardia, no acute ST T wave changes    5. Chronic anemia - Hgb 8.0, near baseline   6. CKDIII Cr 2.0, near baseline avoid nephrotoxins     Diet ADULT DIET; Regular; 5 carb choices (75 gm/meal)   DVT Prophylaxis [x] Lovenox, []  Heparin, [] SCDs, [] Ambulation,  [] Eliquis, [] Xarelto  [] Coumadin   Code Status Full Code   Disposition From: SNF  Expected Disposition: SNF  Estimated Date of Discharge: 3 days  Patient requires continued admission due to    Surrogate Decision Maker/ POA      Subjective:     Chief Complaint: Hypoglycemia (39 per ems upon arrival. Glucagon given at nursing home and D10 given per medics)       Carlyle Odell is a 78 y.o. male who presents with hypoglycemia. Alert and awake this morning, follows commands, feels better, wants to go home. Denies any headache, nausea, vomiting, chest pain, shortness of breath, abdominal pain, palpitations, urinary or bowel changes.         Review of Systems: Review of Systems  10 point ROS negative except as noted above. Objective: Intake/Output Summary (Last 24 hours) at 10/28/2022 1052  Last data filed at 10/28/2022 1199  Gross per 24 hour   Intake 275 ml   Output 1050 ml   Net -775 ml          Vitals:   Vitals:    10/28/22 0835   BP:    Pulse:    Resp:    Temp:    SpO2: 99%       Physical Exam:   Physical Exam   GEN    patient is awake appears stated age, not in respiratory distress  EYES   Pupils are equally round. No scleral erythema, discharge, or conjunctivitis. HENT  Mucous membranes are moist.  NECK  Supple, no apparent thyromegaly or masses. RESP  Clear to auscultation, no wheezes, rales or rhonchi. Respirations even and unlabored on RA. CARDIO/VASC   S1/S2 auscultated. Regular rate without appreciable murmurs, rubs, or gallops. Peripheral pulses equal bilaterally and palpable. No peripheral edema. GI        Abdomen is soft without significant tenderness, masses, or guarding. Suprapubic catheter noted, site looks clean and dry.        No costovertebral angle tenderness. MSK    No gross joint deformities. SKIN    Normal coloration, warm, dry.   NEURO: Awake and alert, follows commands alert and oriented to time and person PSYCH    mood appears normal  Medications:   Medications:    insulin lispro  0-16 Units SubCUTAneous Q4H    insulin lispro  0-4 Units SubCUTAneous Nightly    pantoprazole  40 mg Oral QAM AC    sodium chloride flush  5-40 mL IntraVENous 2 times per day    enoxaparin  40 mg SubCUTAneous Daily    dextrose bolus  250 mL IntraVENous Once    carvedilol  6.25 mg Oral BID WC    ferrous sulfate  325 mg Oral Daily with breakfast    torsemide  20 mg Oral Once per day on Mon Wed Fri    levothyroxine  75 mcg Oral Daily    aspirin  81 mg Oral Nightly      Infusions:    sodium chloride 5 mL/hr (10/27/22 0864)    dextrose       PRN Meds: dextrose bolus, 125 mL, PRN   Or  dextrose bolus, 250 mL, PRN  sodium chloride flush, 5-40 mL, PRN  sodium chloride, , PRN  ondansetron, 4 mg, Q8H PRN   Or  ondansetron, 4 mg, Q6H PRN  polyethylene glycol, 17 g, Daily PRN  acetaminophen, 650 mg, Q6H PRN   Or  acetaminophen, 650 mg, Q6H PRN  glucose, 4 tablet, PRN  dextrose bolus, 125 mL, PRN   Or  dextrose bolus, 250 mL, PRN  glucagon (rDNA), 1 mg, PRN  dextrose, , Continuous PRN      Labs      Recent Results (from the past 24 hour(s))   POCT Glucose    Collection Time: 10/27/22 12:22 PM   Result Value Ref Range    POC Glucose 154 (H) 70 - 99 MG/DL   POCT Glucose    Collection Time: 10/27/22  5:24 PM   Result Value Ref Range    POC Glucose 75 70 - 99 MG/DL   POCT Glucose    Collection Time: 10/27/22  9:36 PM   Result Value Ref Range    POC Glucose 89 70 - 99 MG/DL   POCT Glucose    Collection Time: 10/28/22 12:23 AM   Result Value Ref Range    POC Glucose 123 (H) 70 - 99 MG/DL   POCT Glucose    Collection Time: 10/28/22  4:26 AM   Result Value Ref Range    POC Glucose 112 (H) 70 - 99 MG/DL   POCT Glucose    Collection Time: 10/28/22  8:51 AM   Result Value Ref Range    POC Glucose 93 70 - 99 MG/DL        Imaging/Diagnostics Last 24 Hours   CT HEAD WO CONTRAST    Result Date: 10/24/2022  EXAMINATION: CT OF THE HEAD WITHOUT CONTRAST  10/24/2022 12:41 pm TECHNIQUE: CT of the head was performed without the administration of intravenous contrast. Automated exposure control, iterative reconstruction, and/or weight based adjustment of the mA/kV was utilized to reduce the radiation dose to as low as reasonably achievable. COMPARISON: 04/18/2022 HISTORY: ORDERING SYSTEM PROVIDED HISTORY: HEAD TRAUMA, CLOSED, MILD, GCS >= 13, NO RISK FACTORS, NEURO EXAM NORMAL TECHNOLOGIST PROVIDED HISTORY: Has a \"code stroke\" or \"stroke alert\" been called? ->No Reason for exam:->slurred speech Decision Support Exception - unselect if not a suspected or confirmed emergency medical condition->Emergency Medical Condition (MA) Reason for Exam: slurred speech, Head trauma, closed, mild, GCS >= 13, no risk factors, neuro exam normal FINDINGS: BRAIN/VENTRICLES: There is no acute intracranial hemorrhage, mass effect or midline shift. No abnormal extra-axial fluid collection. The gray-white differentiation is maintained without evidence of an acute infarct. There is no evidence of hydrocephalus. Mild chronic microvascular ischemic change. ORBITS: The visualized portion of the orbits demonstrate no acute abnormality. SINUSES: The visualized paranasal sinuses and mastoid air cells demonstrate no acute abnormality. SOFT TISSUES/SKULL:  No acute abnormality of the visualized skull or soft tissues. No acute intracranial abnormality. XR CHEST PORTABLE    Result Date: 10/24/2022  EXAMINATION: ONE XRAY VIEW OF THE CHEST 10/24/2022 11:41 am COMPARISON: 09/10/2022. HISTORY: ORDERING SYSTEM PROVIDED HISTORY: stroke symptoms TECHNOLOGIST PROVIDED HISTORY: Reason for exam:->stroke symptoms Reason for Exam: STROKE SYMPTOMS FINDINGS: The heart size is within normal limits. The pulmonary vasculature is also within normal limits. No acute infiltrates are seen. The costophrenic angles are sharp bilaterally. No pneumothoraces are noted. There has been removal of the right central venous line. 1. No active pulmonary disease. CT CHEST ABDOMEN PELVIS W CONTRAST Additional Contrast? None    Result Date: 10/24/2022  EXAMINATION: CT OF THE CHEST, ABDOMEN, AND PELVIS WITH CONTRAST 10/24/2022 5:51 pm TECHNIQUE: CT of the chest, abdomen and pelvis was performed with the administration of intravenous contrast. Multiplanar reformatted images are provided for review. Automated exposure control, iterative reconstruction, and/or weight based adjustment of the mA/kV was utilized to reduce the radiation dose to as low as reasonably achievable.  COMPARISON: CT abdomen and pelvis 09/10/2022 HISTORY: ORDERING SYSTEM PROVIDED HISTORY: etiology of sepsis TECHNOLOGIST PROVIDED HISTORY: Reason for exam:->etiology of sepsis Additional Contrast?->None Reason for Exam: etiology of sepsis Additional signs and symptoms: no Relevant Medical/Surgical History: 80 ml isovue 370 used FINDINGS: Chest: Mediastinum: No enlarged lymphadenopathy. Small lymph nodes are scattered. Small fixed hiatus hernia. Minimal wall thickening of the esophagus. Heart: Heart size is normal. No pericardial effusion. Great vessels: the great vessels are patent and unremarkable. Aorta: Aorta is patent and unremarkable. Negative for aneurysm or dissection. Lungs/pleura: Minimal opacities in the lower lobes. Trachea and bronchi are patent. Minimal paraseptal emphysema. No consolidation. Soft Tissues: No enlarged axillary adenopathy. No subcutaneous mass. Thyroid is unremarkable. Appearance of the line in the left arm. However, the tip is in the distal subclavian vein. Osseous: No suspicious lytic or sclerotic lesions. Negative for pneumonia. Mild bibasilar opacities which are most likely atelectasis. Small fixed hiatus hernia. Abdomen/Pelvis: Liver: Liver is normal density. No enhancing masses. Normal enhancement of the intrahepatic vasculature. Spleen:  Normal size. No enhancing masses calcified granulomata in the spleen. Pancreas: No enhancing masses. No ductal dilation. No adjacent fatty stranding. Gallbladder no calcified stones or sludge. No pericholecystic fluid. No wall thickening. Bile ducts: No biliary ductal dilation Adrenals: The adrenal glands are unremarkable Kidneys: Urinary tract stent on the left. The left kidney is mildly atrophic. Perinephric stranding on the left and mild decreased enhancement of the cortex when compared to the right. However, no hydronephrosis. No large mass. Heterogeneity of the contrast enhancement on the left. Left urinary tract stent extends into the bladder. No stones along the pathway of the left ureter. GI: No small bowel dilation. No colonic wall thickening. No large mass.  The stomach is unremarkable in appearance but it is underdistended. Moderate amount of stool throughout the colon. Mesentery: No enlarged lymphadenopathy. No free fluid. No free gas. Aorta: Aorta is of normal size. Negative for dissection. IVC is unremarkable. Celiac axis and SMA are patent. Portal vein is patent. Atherosclerotic change of the aorta in the renal arteries. PELVIS GI: No small bowel dilation. No colonic wall thickening. No enlarged lymphadenopathy. Small amount of free fluid in the pelvis. : The bladder is unremarkable in appearance. No large mass. Suprapubic catheter. The bladder is completely decompressed. Prostate appears to have been resected. Phleboliths in the pelvis. Multiple clips in the pelvis. No enlarged lymphadenopathy. Osseous: Bone island in the right ilium. No lytic destructive lesions. Bone island in the right acetabulum also unchanged. IMPRESSION: 1. Left urinary tract stent is in place. Probable pyelonephritis of the left kidney.        Electronically signed by Anitra Hsieh MD on 10/28/2022 at 10:52 AM

## 2022-10-28 NOTE — CONSULTS
Infectious Disease Consult Note  10/28/2022   Patient Name: Janina Ruiz : 1940   Impression  Enterococcus raffinosus in  Blood Cultures, Pathogen vs Contaminants:  Left Pyelonephritis Secondary to Chronic Left Hydronephrosis, Chronic Urinary Retention:  Recent Fungemia 22:  Recent Recurrent ESBL E.coli:  Afebrile  No leukocytosis, WBC now 3.5  10/24-Urine culture: NGTD  10/24-BC  Enterococcus raffinosus   10/24-CT chest, A&P W IV Contrast: Negative for pneumonia. Mild bibasilar opacities which are most likely   atelectasis. Small fixed hiatus hernia. 1. Left urinary tract stent is in place. Probable pyelonephritis of the left   Kidney  Dr. Chilango Michel onboard for urology, rrec cystoscopy/left stent exchange in the next few weeks after appropriate ABX. Mgt with ureteral stent exchange every 3 months. DMII:  CKD3:  Tobacco Abuse:  H/o Prostate Cancer S/p Prostatectomy :  HTN:  Dementia:  Multi-morbidity: per PMHx: s/p cervical spine fusion, recurrent UTIs with SPC  Plan:  Start IV ampicillin 1 gm q4h  Trend CRP and Pct, ordered  Repeat BC until negative at 48 hours  Ordered repeat [facility] 10/28    Thank you for allowing me to consult in the care of this patient.  ------------------------  REASON FOR CONSULT: Infective syndrome \"Enteroccous raffinosus bacteremia\"  Requested by: Dr. Juan Carlos Jimenez is a 78 y.o. -American male with a history of DMII, chronic anemia, HTN, CKD3, cervical spine fusion, prostate cancer, recurrent UTIs with a SPC intact, tobacco abuse and dementia who was admitted 10/24/2022 for further evaluation and management of altered mental status [facility]. He was reportedly found by the [facility] staff, blood glucose was 24, he was given glucagon and D10. He was then re-checked with a blood sugar in the 40s. The glucagon was repeated and his blood glucose was 39.   He was recently treated and finished a 2 week course of IV ertapenem 3 days prior on 10/21/22 for an ESBL E.coli UTI. He was treated by ID in August, 2022 for sepsis secondary to ESBL E.coli pyelonephritis assoicated with SPC, and Candida albicans fungemia with unclear source, RAFFI was negative for IE. He was treated initially with Fetroja and Eraxis, but due to insurance and financial issues, was transitioned to meropenem and Diflucan for 6 weeks of therapy, ended on 9/27/22.  ?  Infectious diseases service was consulted to evaluate the pt, and recommend further investigative and therapeutic measures. ROS: Other systems reviewed Including eyes, ENT, respiratory, cardiovascular, GI, , dermatologic, neurologic, psych, hem/lymphatic, musculoskeletal and endocrine were negative other than what is mentioned above.      Patient Active Problem List    Diagnosis Date Noted    Acute encephalopathy 05/02/2016    Type 2 diabetes mellitus with hypoglycemia without coma ([address] 05/02/2016    Generalized weakness 03/15/2012    Fungemia 09/13/2022    Hospital discharge follow-up 05/27/2022    Hypoglycemia 04/22/2022    Altered mental status     Serratia marcescens infection     Anemia of chronic disorder 03/16/2012    Gait disturbance 03/15/2012    Hypertension 03/23/2013    History of prostate cancer     Cigarette nicotine dependence without complication     Metabolic encephalopathy 88/85/3144    Coagulase negative Staphylococcus bacteremia 03/27/2022    Chronic kidney disease, stage IV (severe) ([address] 89/84/6790    Acute metabolic encephalopathy 64/54/5724    SVT (supraventricular tachycardia) ([address] 03/27/2022    Sepsis due to urinary tract infection ([address] 03/17/2022    Chronic kidney disease, stage 3a ([address]     Uncontrolled type 2 diabetes mellitus with peripheral neuropathy     Prostate cancer ([address]     Suprapubic catheter ([address]     Essential hypertension 11/17/2021    Severe muscle deconditioning 11/17/2021    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 ([address] 11/17/2021    Frequent falls 38/82/0421    Complicated UTI (urinary tract infection) 05/04/2020    Leg weakness, bilateral 07/11/2016    Type 2 diabetes mellitus with neurological manifestations, uncontrolled 07/11/2016    Hyperkalemia 06/30/2016    Acute kidney failure ([address] 06/30/2016    UTI (urinary tract infection) due to urinary indwelling catheter ([address] 05/02/2016    Sepsis ([address] 12/11/2014    Infected implanted bladder sphincter cuff 03/24/2013    Escherichia coli urinary tract infection 03/24/2013    Diabetes mellitus ([address]     Osteoarthritis      Past Medical History:   Diagnosis Date    Acute urinary tract infection 3/15/2012    Ataxia 2010    Diabetes mellitus ([address] 2011    type 2, controlled    Fusion of spine of cervical region 10/2012    Gait disturbance 2011    History of prostate cancer 1996    adenocarcinoma    History of tobacco use 1959    Hyperlipidemia 2011    Osteoarthritis 2011      Past Surgical History:   Procedure Laterality Date    BLADDER SURGERY      BLADDER SURGERY Left 3/17/2022    CYSTOSCOPY LEFT STENT EXCHANGE performed by Herman Wiggins MD at [facility] Left 9/7/2022    LEFT CYSTOSCOPY URETERAL STENT EXCHANGE AND [address] performed by Lorilee Heimlich, MD at [address]  3/28/13    Cysto with removal of artificial sphinter and placement of suprapubic catheter. PROSTATE SURGERY      PROSTATECTOMY  1996    infection      Family History   Problem Relation Age of Onset    Cancer Mother     Diabetes Father     Heart Disease Father     High Blood Pressure Father     Diabetes Sister     High Blood Pressure Sister     Cancer Brother         prostate, breast    Diabetes Brother     Cancer Maternal Uncle     Diabetes Maternal Grandmother     Stroke Maternal Grandfather       Infectious disease related family history - not contibutory.    SOCIAL HISTORY  Social History     Tobacco Use    Smoking status: Former     Packs/day: 0.50     Types: Cigarettes     Quit date: 1976     Years since quittin.3    Smokeless tobacco: Never   Substance Use Topics    Alcohol use: No     Comment: Quit in       Born: [place], [place]  Lives: [place], [place] with son  Occupation: Retired as a supervisor at [employer]  No recent travel of significance. No recent unusual exposures. NO pets   ? ALLERGIES  No Known Allergies   MEDICATIONS  Reviewed and are per the chart/EMR. ? Antibiotics:   Present:  Ampicillin 10/28-  Past:  Meropenem 10/24-27?  -------------------------------------------------------------------------------------------------------------------    Vital Signs:  Vitals:    10/28/22 0835   BP:    Pulse:    Resp:    Temp:    SpO2: 99%         Exam:    VS: noted; wt 213 lb (96.9 kg) Height 5'8\"  Gen: alert, pleasantly intermittently dis-oriented, no distress  Skin: no stigmata of endocarditis  Wounds: C/D/I  HEMT: AT/NC Oropharynx pink, moist, and without lesions or exudates; dentition in good state of repair  Eyes: PERRLA, EOMI, conjunctiva pink, sclera anicteric. Neck: Supple. Trachea midline. No LAD. Chest: no distress and CTA. Good air movement. Heart: RRR and no MRG. Abd: soft, non-distended, left CVA tenderness present, no hepatomegaly. Normoactive bowel sounds. Ext: no clubbing, cyanosis, or edema  Supra-Pubic Catheter Site: without erythema or tenderness draining clear yellow urine  Neuro: Mental status intact. CN 2-12 intact and no focal sensory or motor deficits    ? Diagnostic Studies: reviewed  10/24/22 XR Chest Portable:  Impression   1. No active pulmonary disease. 10/24/22 CT Head WO Contrast:  Impression   No acute intracranial abnormality. 10/24/22 CT Chest Abdomen Pelvis W Contrast:  Impression       Negative for pneumonia. Mild bibasilar opacities which are most likely   atelectasis. Small fixed hiatus hernia. Abdomen/Pelvis:       Liver: Liver is normal density. No enhancing masses.   Normal enhancement of   the intrahepatic vasculature. Spleen:  Normal size. No enhancing masses calcified granulomata in the   spleen. Pancreas: No enhancing masses. No ductal dilation. No adjacent fatty   stranding. Gallbladder no calcified stones or sludge. No pericholecystic fluid. No   wall thickening. Bile ducts: No biliary ductal dilation       Adrenals: The adrenal glands are unremarkable       Kidneys: Urinary tract stent on the left. The left kidney is mildly   atrophic. Perinephric stranding on the left and mild decreased enhancement   of the cortex when compared to the right. However, no hydronephrosis. No   large mass. Heterogeneity of the contrast enhancement on the left. Left   urinary tract stent extends into the bladder. No stones along the pathway of   the left ureter. GI: No small bowel dilation. No colonic wall thickening. No large mass. The stomach is unremarkable in appearance but it is underdistended. Moderate   amount of stool throughout the colon. Mesentery: No enlarged lymphadenopathy. No free fluid. No free gas. Aorta: Aorta is of normal size. Negative for dissection. IVC is   unremarkable. Celiac axis and SMA are patent. Portal vein is patent. Atherosclerotic change of the aorta in the renal arteries. PELVIS       GI: No small bowel dilation. No colonic wall thickening. No enlarged   lymphadenopathy. Small amount of free fluid in the pelvis. : The bladder is unremarkable in appearance. No large mass. Suprapubic   catheter. The bladder is completely decompressed. Prostate appears to have   been resected. Phleboliths in the pelvis. Multiple clips in the pelvis. No   enlarged lymphadenopathy. Osseous: Bone island in the right ilium. No lytic destructive lesions. Bone   island in the right acetabulum also unchanged. IMPRESSION:   1. Left urinary tract stent is in place. Probable pyelonephritis of the left   kidney.        ?  I have examined this patient and available medical records on this date and have made the above observations, conclusions and recommendations. Electronically signed by: Electronically signed by Tommy Huff.  VIVIANA Tate CNP on 10/28/2022 at 11:12 AM

## 2022-10-28 NOTE — PLAN OF CARE
Problem: Discharge Planning  Goal: Discharge to home or other facility with appropriate resources  10/28/2022 1502 by Donny Smith RN  Outcome: Progressing  10/28/2022 0657 by Oswaldo Lopes RN  Outcome: Progressing     Problem: Safety - Adult  Goal: Free from fall injury  10/28/2022 1502 by Donny Smith RN  Outcome: Progressing  10/28/2022 0657 by Oswaldo Lopes RN  Outcome: Progressing     Problem: Skin/Tissue Integrity  Goal: Absence of new skin breakdown  Description: 1. Monitor for areas of redness and/or skin breakdown  2. Assess vascular access sites hourly  3. Every 4-6 hours minimum:  Change oxygen saturation probe site  4. Every 4-6 hours:  If on nasal continuous positive airway pressure, respiratory therapy assess nares and determine need for appliance change or resting period. 10/28/2022 1502 by Donny Smith RN  Outcome: Progressing  10/28/2022 0657 by Oswaldo Lopes RN  Outcome: Progressing     Problem: ABCDS Injury Assessment  Goal: Absence of physical injury  10/28/2022 1502 by Donny Smith RN  Outcome: Progressing  10/28/2022 0657 by Oswaldo Lopes RN  Outcome: Progressing     Problem: Confusion  Goal: Confusion, delirium, dementia, or psychosis is improved or at baseline  Description: INTERVENTIONS:  1. Assess for possible contributors to thought disturbance, including medications, impaired vision or hearing, underlying metabolic abnormalities, dehydration, psychiatric diagnoses, and notify attending LIP  2. [place] high risk fall precautions, as indicated  3. Provide frequent short contacts to provide reality reorientation, refocusing and direction  4. Decrease environmental stimuli, including noise as appropriate  5. Monitor and intervene to maintain adequate nutrition, hydration, elimination, sleep and activity  6. If unable to ensure safety without constant attention obtain sitter and review sitter guidelines with assigned personnel  7.  Initiate Psychosocial CNS and Spiritual Care consult, as indicated  10/28/2022 1502 by Masood Rogers RN  Outcome: Progressing  10/28/2022 0657 by Alhaji Treviño RN  Outcome: Progressing     Problem: Chronic Conditions and Co-morbidities  Goal: Patient's chronic conditions and co-morbidity symptoms are monitored and maintained or improved  10/28/2022 1502 by Masood Rogers RN  Outcome: Progressing  10/28/2022 0657 by Alhaji Treviño RN  Outcome: Progressing

## 2022-10-28 NOTE — PLAN OF CARE
Problem: Discharge Planning  Goal: Discharge to home or other facility with appropriate resources  Outcome: Progressing     Problem: Safety - Adult  Goal: Free from fall injury  Outcome: Progressing     Problem: Skin/Tissue Integrity  Goal: Absence of new skin breakdown  Description: 1. Monitor for areas of redness and/or skin breakdown  2. Assess vascular access sites hourly  3. Every 4-6 hours minimum:  Change oxygen saturation probe site  4. Every 4-6 hours:  If on nasal continuous positive airway pressure, respiratory therapy assess nares and determine need for appliance change or resting period. Outcome: Progressing     Problem: ABCDS Injury Assessment  Goal: Absence of physical injury  Outcome: Progressing     Problem: Confusion  Goal: Confusion, delirium, dementia, or psychosis is improved or at baseline  Description: INTERVENTIONS:  1. Assess for possible contributors to thought disturbance, including medications, impaired vision or hearing, underlying metabolic abnormalities, dehydration, psychiatric diagnoses, and notify attending LIP  2. [place] high risk fall precautions, as indicated  3. Provide frequent short contacts to provide reality reorientation, refocusing and direction  4. Decrease environmental stimuli, including noise as appropriate  5. Monitor and intervene to maintain adequate nutrition, hydration, elimination, sleep and activity  6. If unable to ensure safety without constant attention obtain sitter and review sitter guidelines with assigned personnel  7.  Initiate Psychosocial CNS and Spiritual Care consult, as indicated  Outcome: Progressing     Problem: Chronic Conditions and Co-morbidities  Goal: Patient's chronic conditions and co-morbidity symptoms are monitored and maintained or improved  Outcome: Progressing

## 2022-10-28 NOTE — PROGRESS NOTES
4 Eyes Skin Assessment     NAME:  Siri Campos  YOB: 1940  MEDICAL RECORD NUMBER:  [id number]    The patient is being assess for  Transfer to New Unit    I agree that 2 RN's have performed a thorough Head to Toe Skin Assessment on the patient. ALL assessment sites listed below have been assessed. Areas assessed by both nurses:    Head, Face, Ears, Shoulders, Back, Chest, Arms, Elbows, Hands, Sacrum. Buttock, Coccyx, Ischium, and Legs. Feet and Heels        Does the Patient have a Wound?  No noted wound(s)       Judd Prevention initiated:  Yes   Wound Care Orders initiated:  No    Pressure Injury (Stage 3,4, Unstageable, DTI, NWPT, and Complex wounds) if present place referral/consult order under [de-identified] No    New and Established Ostomies if present place consult order under : No      Nurse 1 eSignature: Electronically signed by MILAGRO Huerta RN on 10/28/22 at 6:02 PM EDT    **SHARE this note so that the co-signing nurse is able to place an eSignature**    Nurse 2 eSignature: Electronically signed by Oliva Wilson RN on 10/28/22 at 6:03 PM EDT

## 2022-10-29 LAB
CULTURE: NORMAL
GLUCOSE BLD-MCNC: 128 MG/DL (ref 70–99)
GLUCOSE BLD-MCNC: 164 MG/DL (ref 70–99)
GLUCOSE BLD-MCNC: 170 MG/DL (ref 70–99)
GLUCOSE BLD-MCNC: 215 MG/DL (ref 70–99)
GLUCOSE BLD-MCNC: 218 MG/DL (ref 70–99)
GLUCOSE BLD-MCNC: 253 MG/DL (ref 70–99)
GLUCOSE BLD-MCNC: 281 MG/DL (ref 70–99)
GLUCOSE BLD-MCNC: 343 MG/DL (ref 70–99)
GLUCOSE BLD-MCNC: 66 MG/DL (ref 70–99)
Lab: NORMAL
SPECIMEN: NORMAL

## 2022-10-29 PROCEDURE — 6360000002 HC RX W HCPCS: Performed by: NURSE PRACTITIONER

## 2022-10-29 PROCEDURE — 6370000000 HC RX 637 (ALT 250 FOR IP): Performed by: NURSE PRACTITIONER

## 2022-10-29 PROCEDURE — 2580000003 HC RX 258: Performed by: PHYSICIAN ASSISTANT

## 2022-10-29 PROCEDURE — 94761 N-INVAS EAR/PLS OXIMETRY MLT: CPT

## 2022-10-29 PROCEDURE — 97535 SELF CARE MNGMENT TRAINING: CPT

## 2022-10-29 PROCEDURE — 6360000002 HC RX W HCPCS: Performed by: PHYSICIAN ASSISTANT

## 2022-10-29 PROCEDURE — 6370000000 HC RX 637 (ALT 250 FOR IP): Performed by: PHYSICIAN ASSISTANT

## 2022-10-29 PROCEDURE — 97530 THERAPEUTIC ACTIVITIES: CPT

## 2022-10-29 PROCEDURE — 2140000000 HC CCU INTERMEDIATE R&B

## 2022-10-29 PROCEDURE — 2580000003 HC RX 258: Performed by: INTERNAL MEDICINE

## 2022-10-29 PROCEDURE — 97110 THERAPEUTIC EXERCISES: CPT

## 2022-10-29 PROCEDURE — 2580000003 HC RX 258: Performed by: NURSE PRACTITIONER

## 2022-10-29 RX ADMIN — CARVEDILOL 6.25 MG: 6.25 TABLET, FILM COATED ORAL at 08:04

## 2022-10-29 RX ADMIN — LEVOTHYROXINE SODIUM 75 MCG: 0.07 TABLET ORAL at 06:46

## 2022-10-29 RX ADMIN — AMPICILLIN SODIUM 1000 MG: 1 INJECTION, POWDER, FOR SOLUTION INTRAMUSCULAR; INTRAVENOUS at 17:00

## 2022-10-29 RX ADMIN — CARVEDILOL 6.25 MG: 6.25 TABLET, FILM COATED ORAL at 16:55

## 2022-10-29 RX ADMIN — AMPICILLIN SODIUM 1000 MG: 1 INJECTION, POWDER, FOR SOLUTION INTRAMUSCULAR; INTRAVENOUS at 08:04

## 2022-10-29 RX ADMIN — AMPICILLIN SODIUM 1000 MG: 1 INJECTION, POWDER, FOR SOLUTION INTRAMUSCULAR; INTRAVENOUS at 20:40

## 2022-10-29 RX ADMIN — SODIUM CHLORIDE, PRESERVATIVE FREE 10 ML: 5 INJECTION INTRAVENOUS at 08:05

## 2022-10-29 RX ADMIN — ENOXAPARIN SODIUM 40 MG: 40 INJECTION SUBCUTANEOUS at 08:04

## 2022-10-29 RX ADMIN — PANTOPRAZOLE SODIUM 40 MG: 40 TABLET, DELAYED RELEASE ORAL at 06:46

## 2022-10-29 RX ADMIN — SODIUM CHLORIDE, PRESERVATIVE FREE 10 ML: 5 INJECTION INTRAVENOUS at 20:41

## 2022-10-29 RX ADMIN — DEXTROSE MONOHYDRATE 125 ML: 10 INJECTION, SOLUTION INTRAVENOUS at 01:29

## 2022-10-29 RX ADMIN — AMPICILLIN SODIUM 1000 MG: 1 INJECTION, POWDER, FOR SOLUTION INTRAMUSCULAR; INTRAVENOUS at 04:12

## 2022-10-29 RX ADMIN — FERROUS SULFATE TAB 325 MG (65 MG ELEMENTAL FE) 325 MG: 325 (65 FE) TAB at 08:04

## 2022-10-29 RX ADMIN — ASPIRIN 81 MG CHEWABLE TABLET 81 MG: 81 TABLET CHEWABLE at 20:36

## 2022-10-29 RX ADMIN — AMPICILLIN SODIUM 1000 MG: 1 INJECTION, POWDER, FOR SOLUTION INTRAMUSCULAR; INTRAVENOUS at 11:54

## 2022-10-29 ASSESSMENT — PAIN SCALES - GENERAL
PAINLEVEL_OUTOF10: 0

## 2022-10-29 NOTE — PROGRESS NOTES
Occupational Therapy  . Occupational Therapy Treatment Note    Name: Jael Bennett MRN: [id number] :   1940   Date:  10/29/2022   Admission Date: 10/24/2022 Room:  [facility]/312-A     Primary Problem:      Restrictions/Precautions:          General precautions, fall risk      Communication with other providers: cotx with PTA Allyssa Mora for assist and safety as well as patient tolerance with therapy. Subjective:  Patient states:  Dr Tere Muse came to my house this morning and said I could go to [facility]. Patient possibly seeing things- mentioned a forklift being in room- but unsure if he was referring to matheus (not in room at this time). But did mention him spilling his drink d/t the fork lift. Pain:   Location, Type, Intensity (0/10 to 10/10):  denies    Objective:    Observation: patient supine. Confused intermittently. Abel Wiley make comments that are nonsensical  but aware of being in the hospital and knows address. Objective Measures:  tele    Treatment, including education:    ADL activity training was instructed today. Cues were given for safety, sequence, UE/LE placement, visual cues, and balance. Activities performed today included toileting, hand hygiene,   Toileting-DEP for BM. Completed while side lying. Facial hygiene-SBA in high fowlers. Therapeutic activity training was instructed today. Cues were given for safety, sequence, UE/LE placement, awareness, and balance. Activities performed today included bed mobility training, sup-sit, sit-stand, SPT. Rolling- L-R-  initially Max d/t poor understanding of what is being asked, progressed to Mod with bout of Min A to hold side lying once understood what was being asked. Supine to EOB via log roll- Mod x2 with  a lot of cues. Eob sitting balance- initially Max- progressed to CGA-Min A once understood safety concerns regarding retro lean.    Stand NO AD- x4 trials- each stand- Min x2 however each stand required Mod x2 in order to sustain stand as well as balance. Patient does abruptly to sit to EOB d/t being afraid. Squat pivot d/t unable to stand pivot. - Max x2 with a lot of cues-  Scooting hips back on recliner- Mod A    Patient educated on role of OT , benefits of OT and rationale for therapeutic intervention. Benefit of OOB/EOB activities, benefit of movement. AE/AD, WS/EC,     All therapeutic intervention performed c emphasis on dynamic balance / standing tolerance to increase strength, endurance and activity tolerance for increased [place] c ADL tasks and func transfers / mobility. Patient left safely in bedside chair at end of session, with call light in reach, alarm on and nursing aware. Gait belt was used for func transfers / mobility. Assessment / Impression:    Patient appears more confused than when this BALBUENA saw patient last. Does have nonsensical statements and needs increased time for cues and explanation of tasks and sequencing.      Patient's tolerance of treatment: fair +  Adverse Reaction: None  Significant change in status and impact:   Barriers to improvement: cognition      Plan for Next Session:    Continue with OT POC      Time in:  1005  Time out:  1050  Timed treatment minutes:  45  Total treatment time:  45      Electronically signed by:    KHARI Ramirez,   10/29/2022, 10:11 AM

## 2022-10-29 NOTE — PROGRESS NOTES
V2.0  [facility] Hospitalist Progress Note      Name:  Yoselyn Alfaro /Age/Sex: 1940  (78 y.o. male)   MRN & CSN:  [id number] & [id number] Encounter Date/Time: 10/29/2022 2:03 PM EDT    Location:  [address] [place] [address] PCP: Solo Pace MD       Hospital Day: 6    Assessment and Plan:   Yoselyn Alfaro is a 78 y.o. male  who presents with Hypoglycemia      Plan:  Acute severe and refractory hypoglycemia, can be secondary to decreased p.o. intake, blood sugars better in the last 24 hours. Acute metabolic encephalopathy S/T severe and refractory hypoglycemia, improving  CT head no acute process  CT chest, abdomen, pelvis ordered showed probable pyelonephritis of L kidney, currently on meropenm, will discontinue plan for cystoscopy/left ureteral stent exchange as outpatient    3. Enterococcus raffinosus bacteremia: 1 out of 4 bottles were positive for it, will start patient on ampicillin and consult ID. Recommends repeat blood cultures until negative. 4. Elevated troponin  trop T 0.044, chronically elevated in setting of CKD, EKG with sinus bradycardia, no acute ST T wave changes    5. Chronic anemia - Hgb 8.0, near baseline   6. CKDIII Cr 2.0, near baseline avoid nephrotoxins     Diet ADULT DIET; Regular; 5 carb choices (75 gm/meal)   DVT Prophylaxis [x] Lovenox, []  Heparin, [] SCDs, [] Ambulation,  [] Eliquis, [] Xarelto  [] Coumadin   Code Status Full Code   Disposition From: SNF  Expected Disposition: SNF  Estimated Date of Discharge: 3 days  Patient requires continued admission due to    Surrogate Decision Maker/ POA      Subjective:     Chief Complaint: Hypoglycemia (39 per ems upon arrival. Glucagon given at nursing home and D10 given per medics)       Yoselyn Alfaro is a 78 y.o. male who presents with hypoglycemia. Alert and awake this morning, follows commands, feels better, wants to go home.  Denies any headache, nausea, vomiting, chest pain, shortness of breath, abdominal pain, palpitations, urinary or bowel changes. Review of Systems:    Review of Systems  10 point ROS negative except as noted above. Objective: Intake/Output Summary (Last 24 hours) at 10/29/2022 0758  Last data filed at 10/29/2022 0554  Gross per 24 hour   Intake 375.46 ml   Output 950 ml   Net -574.54 ml          Vitals:   Vitals:    10/29/22 0400   BP: (!) 155/60   Pulse: 80   Resp: 14   Temp: 97.9 °F (36.6 °C)   SpO2: 100%       Physical Exam:   Physical Exam   GEN    patient is awake appears stated age, not in respiratory distress  EYES   Pupils are equally round. No scleral erythema, discharge, or conjunctivitis. HENT  Mucous membranes are moist.  NECK  Supple, no apparent thyromegaly or masses. RESP  Clear to auscultation, no wheezes, rales or rhonchi. Respirations even and unlabored on RA. CARDIO/VASC   S1/S2 auscultated. Regular rate without appreciable murmurs, rubs, or gallops. Peripheral pulses equal bilaterally and palpable. No peripheral edema. GI        Abdomen is soft without significant tenderness, masses, or guarding. Suprapubic catheter noted, site looks clean and dry.        No costovertebral angle tenderness. MSK    No gross joint deformities. SKIN    Normal coloration, warm, dry.   NEURO: Awake and alert, follows commands alert and oriented to time and person PSYCH    mood appears normal  Medications:   Medications:    ampicillin IV  1,000 mg IntraVENous 6 times per day    insulin lispro  0-16 Units SubCUTAneous Q4H    insulin lispro  0-4 Units SubCUTAneous Nightly    pantoprazole  40 mg Oral QAM AC    sodium chloride flush  5-40 mL IntraVENous 2 times per day    enoxaparin  40 mg SubCUTAneous Daily    dextrose bolus  250 mL IntraVENous Once    carvedilol  6.25 mg Oral BID WC    ferrous sulfate  325 mg Oral Daily with breakfast    torsemide  20 mg Oral Once per day on Mon Wed Fri    levothyroxine  75 mcg Oral Daily    aspirin  81 mg Oral Nightly      Infusions:    sodium chloride Stopped (10/28/22 0422)    dextrose       PRN Meds: dextrose bolus, 125 mL, PRN   Or  dextrose bolus, 250 mL, PRN  sodium chloride flush, 5-40 mL, PRN  sodium chloride, , PRN  ondansetron, 4 mg, Q8H PRN   Or  ondansetron, 4 mg, Q6H PRN  polyethylene glycol, 17 g, Daily PRN  acetaminophen, 650 mg, Q6H PRN   Or  acetaminophen, 650 mg, Q6H PRN  glucose, 4 tablet, PRN  dextrose bolus, 125 mL, PRN   Or  dextrose bolus, 250 mL, PRN  glucagon (rDNA), 1 mg, PRN  dextrose, , Continuous PRN      Labs      Recent Results (from the past 24 hour(s))   POCT Glucose    Collection Time: 10/28/22  8:51 AM   Result Value Ref Range    POC Glucose 93 70 - 99 MG/DL   POCT Glucose    Collection Time: 10/28/22 11:54 AM   Result Value Ref Range    POC Glucose 87 70 - 99 MG/DL   POCT Glucose    Collection Time: 10/28/22  4:18 PM   Result Value Ref Range    POC Glucose 268 (H) 70 - 99 MG/DL   POCT Glucose    Collection Time: 10/28/22  8:06 PM   Result Value Ref Range    POC Glucose 85 70 - 99 MG/DL   POCT Glucose    Collection Time: 10/28/22 11:55 PM   Result Value Ref Range    POC Glucose 26 (L) 70 - 99 MG/DL   POCT Glucose    Collection Time: 10/29/22 12:22 AM   Result Value Ref Range    POC Glucose 170 (H) 70 - 99 MG/DL   POCT Glucose    Collection Time: 10/29/22  1:20 AM   Result Value Ref Range    POC Glucose 66 (L) 70 - 99 MG/DL   POCT Glucose    Collection Time: 10/29/22  2:01 AM   Result Value Ref Range    POC Glucose 128 (H) 70 - 99 MG/DL   POCT Glucose    Collection Time: 10/29/22  4:09 AM   Result Value Ref Range    POC Glucose 164 (H) 70 - 99 MG/DL   POCT Glucose    Collection Time: 10/29/22  7:44 AM   Result Value Ref Range    POC Glucose 343 (H) 70 - 99 MG/DL        Imaging/Diagnostics Last 24 Hours   CT HEAD WO CONTRAST    Result Date: 10/24/2022  EXAMINATION: CT OF THE HEAD WITHOUT CONTRAST  10/24/2022 12:41 pm TECHNIQUE: CT of the head was performed without the administration of intravenous contrast. Automated exposure control, iterative reconstruction, and/or weight based adjustment of the mA/kV was utilized to reduce the radiation dose to as low as reasonably achievable. COMPARISON: 04/18/2022 HISTORY: ORDERING SYSTEM PROVIDED HISTORY: HEAD TRAUMA, CLOSED, MILD, GCS >= 13, NO RISK FACTORS, NEURO EXAM NORMAL TECHNOLOGIST PROVIDED HISTORY: Has a \"code stroke\" or \"stroke alert\" been called? ->No Reason for exam:->slurred speech Decision Support Exception - unselect if not a suspected or confirmed emergency medical condition->Emergency Medical Condition (MA) Reason for Exam: slurred speech, Head trauma, closed, mild, GCS >= 13, no risk factors, neuro exam normal FINDINGS: BRAIN/VENTRICLES: There is no acute intracranial hemorrhage, mass effect or midline shift. No abnormal extra-axial fluid collection. The gray-white differentiation is maintained without evidence of an acute infarct. There is no evidence of hydrocephalus. Mild chronic microvascular ischemic change. ORBITS: The visualized portion of the orbits demonstrate no acute abnormality. SINUSES: The visualized paranasal sinuses and mastoid air cells demonstrate no acute abnormality. SOFT TISSUES/SKULL:  No acute abnormality of the visualized skull or soft tissues. No acute intracranial abnormality. XR CHEST PORTABLE    Result Date: 10/24/2022  EXAMINATION: ONE XRAY VIEW OF THE CHEST 10/24/2022 11:41 am COMPARISON: 09/10/2022. HISTORY: ORDERING SYSTEM PROVIDED HISTORY: stroke symptoms TECHNOLOGIST PROVIDED HISTORY: Reason for exam:->stroke symptoms Reason for Exam: STROKE SYMPTOMS FINDINGS: The heart size is within normal limits. The pulmonary vasculature is also within normal limits. No acute infiltrates are seen. The costophrenic angles are sharp bilaterally. No pneumothoraces are noted. There has been removal of the right central venous line. 1. No active pulmonary disease.      CT CHEST ABDOMEN PELVIS W CONTRAST Additional Contrast? None    Result Date: 10/24/2022  EXAMINATION: CT OF THE CHEST, ABDOMEN, AND PELVIS WITH CONTRAST 10/24/2022 5:51 pm TECHNIQUE: CT of the chest, abdomen and pelvis was performed with the administration of intravenous contrast. Multiplanar reformatted images are provided for review. Automated exposure control, iterative reconstruction, and/or weight based adjustment of the mA/kV was utilized to reduce the radiation dose to as low as reasonably achievable. COMPARISON: CT abdomen and pelvis 09/10/2022 HISTORY: ORDERING SYSTEM PROVIDED HISTORY: etiology of sepsis TECHNOLOGIST PROVIDED HISTORY: Reason for exam:->etiology of sepsis Additional Contrast?->None Reason for Exam: etiology of sepsis Additional signs and symptoms: no Relevant Medical/Surgical History: 80 ml isovue 370 used FINDINGS: Chest: Mediastinum: No enlarged lymphadenopathy. Small lymph nodes are scattered. Small fixed hiatus hernia. Minimal wall thickening of the esophagus. Heart: Heart size is normal. No pericardial effusion. Great vessels: the great vessels are patent and unremarkable. Aorta: Aorta is patent and unremarkable. Negative for aneurysm or dissection. Lungs/pleura: Minimal opacities in the lower lobes. Trachea and bronchi are patent. Minimal paraseptal emphysema. No consolidation. Soft Tissues: No enlarged axillary adenopathy. No subcutaneous mass. Thyroid is unremarkable. Appearance of the line in the left arm. However, the tip is in the distal subclavian vein. Osseous: No suspicious lytic or sclerotic lesions. Negative for pneumonia. Mild bibasilar opacities which are most likely atelectasis. Small fixed hiatus hernia. Abdomen/Pelvis: Liver: Liver is normal density. No enhancing masses. Normal enhancement of the intrahepatic vasculature. Spleen:  Normal size. No enhancing masses calcified granulomata in the spleen. Pancreas: No enhancing masses. No ductal dilation. No adjacent fatty stranding. Gallbladder no calcified stones or sludge.   No pericholecystic fluid. No wall thickening. Bile ducts: No biliary ductal dilation Adrenals: The adrenal glands are unremarkable Kidneys: Urinary tract stent on the left. The left kidney is mildly atrophic. Perinephric stranding on the left and mild decreased enhancement of the cortex when compared to the right. However, no hydronephrosis. No large mass. Heterogeneity of the contrast enhancement on the left. Left urinary tract stent extends into the bladder. No stones along the pathway of the left ureter. GI: No small bowel dilation. No colonic wall thickening. No large mass. The stomach is unremarkable in appearance but it is underdistended. Moderate amount of stool throughout the colon. Mesentery: No enlarged lymphadenopathy. No free fluid. No free gas. Aorta: Aorta is of normal size. Negative for dissection. IVC is unremarkable. Celiac axis and SMA are patent. Portal vein is patent. Atherosclerotic change of the aorta in the renal arteries. PELVIS GI: No small bowel dilation. No colonic wall thickening. No enlarged lymphadenopathy. Small amount of free fluid in the pelvis. : The bladder is unremarkable in appearance. No large mass. Suprapubic catheter. The bladder is completely decompressed. Prostate appears to have been resected. Phleboliths in the pelvis. Multiple clips in the pelvis. No enlarged lymphadenopathy. Osseous: Bone island in the right ilium. No lytic destructive lesions. Bone island in the right acetabulum also unchanged. IMPRESSION: 1. Left urinary tract stent is in place. Probable pyelonephritis of the left kidney.        Electronically signed by Alexandrea Kumar MD on 10/29/2022 at 7:58 AM

## 2022-10-29 NOTE — PROGRESS NOTES
[facility]   [facility]  Progress Note 0 1 2      Date: 10/29/2022   Patient: Carlyle Odell   : 1940   DOA: 10/24/2022   MRN: [id number]   ROOM#: [id number]     Subjective     No pain     Objective     AF/VSS    CBC:   Lab Results   Component Value Date/Time    WBC 3.5 10/26/2022 04:30 AM    RBC 2.67 10/26/2022 04:30 AM    HGB 8.0 10/26/2022 04:30 AM    HCT 27.1 10/26/2022 04:30 AM    .5 10/26/2022 04:30 AM    MCH 30.0 10/26/2022 04:30 AM    MCHC 29.5 10/26/2022 04:30 AM    RDW 15.9 10/26/2022 04:30 AM     10/26/2022 04:30 AM    MPV 12.2 10/26/2022 04:30 AM     BMP:    Lab Results   Component Value Date/Time     10/26/2022 04:30 AM    K 4.6 10/26/2022 04:30 AM    K 3.9 2018 04:42 AM     10/26/2022 04:30 AM    CO2 20 10/26/2022 04:30 AM    BUN 44 10/26/2022 04:30 AM    LABALBU 3.4 10/25/2022 05:39 AM    CREATININE 2.1 10/26/2022 04:30 AM    CALCIUM 8.7 10/26/2022 04:30 AM    GFRAA 31 10/13/2022 06:30 AM    LABGLOM 30 10/26/2022 04:30 AM    GLUCOSE 385 10/26/2022 04:30 AM       Imaging Studies:     CT CHEST ABDOMEN PELVIS W CONTRAST Additional Contrast? None     Result Date: 10/24/2022  EXAMINATION: CT OF THE CHEST, ABDOMEN, AND PELVIS WITH CONTRAST 10/24/2022 5:51 pm TECHNIQUE: CT of the chest, abdomen and pelvis was performed with the administration of intravenous contrast. Multiplanar reformatted images are provided for review. Automated exposure control, iterative reconstruction, and/or weight based adjustment of the mA/kV was utilized to reduce the radiation dose to as low as reasonably achievable.  COMPARISON: CT abdomen and pelvis 09/10/2022 HISTORY: ORDERING SYSTEM PROVIDED HISTORY: etiology of sepsis TECHNOLOGIST PROVIDED HISTORY: Reason for exam:->etiology of sepsis Additional Contrast?->None Reason for Exam: etiology of sepsis Additional signs and symptoms: no Relevant Medical/Surgical History: 80 ml isovue 370 used FINDINGS: Chest: Mediastinum: No enlarged lymphadenopathy. Small lymph nodes are scattered. Small fixed hiatus hernia. Minimal wall thickening of the esophagus. Heart: Heart size is normal. No pericardial effusion. Great vessels: the great vessels are patent and unremarkable. Aorta: Aorta is patent and unremarkable. Negative for aneurysm or dissection. Lungs/pleura: Minimal opacities in the lower lobes. Trachea and bronchi are patent. Minimal paraseptal emphysema. No consolidation. Soft Tissues: No enlarged axillary adenopathy. No subcutaneous mass. Thyroid is unremarkable. Appearance of the line in the left arm. However, the tip is in the distal subclavian vein. Osseous: No suspicious lytic or sclerotic lesions. Negative for pneumonia. Mild bibasilar opacities which are most likely atelectasis. Small fixed hiatus hernia. Abdomen/Pelvis: Liver: Liver is normal density. No enhancing masses. Normal enhancement of the intrahepatic vasculature. Spleen:  Normal size. No enhancing masses calcified granulomata in the spleen. Pancreas: No enhancing masses. No ductal dilation. No adjacent fatty stranding. Gallbladder no calcified stones or sludge. No pericholecystic fluid. No wall thickening. Bile ducts: No biliary ductal dilation Adrenals: The adrenal glands are unremarkable Kidneys: Urinary tract stent on the left. The left kidney is mildly atrophic. Perinephric stranding on the left and mild decreased enhancement of the cortex when compared to the right. However, no hydronephrosis. No large mass. Heterogeneity of the contrast enhancement on the left. Left urinary tract stent extends into the bladder. No stones along the pathway of the left ureter. GI: No small bowel dilation. No colonic wall thickening. No large mass. The stomach is unremarkable in appearance but it is underdistended. Moderate amount of stool throughout the colon. Mesentery: No enlarged lymphadenopathy. No free fluid. No free gas.  Aorta: Aorta is of normal size. Negative for dissection. IVC is unremarkable. Celiac axis and SMA are patent. Portal vein is patent. Atherosclerotic change of the aorta in the renal arteries. PELVIS GI: No small bowel dilation. No colonic wall thickening. No enlarged lymphadenopathy. Small amount of free fluid in the pelvis. : The bladder is unremarkable in appearance. No large mass. Suprapubic catheter. The bladder is completely decompressed. Prostate appears to have been resected. Phleboliths in the pelvis. Multiple clips in the pelvis. No enlarged lymphadenopathy. Osseous: Bone island in the right ilium. No lytic destructive lesions. Bone island in the right acetabulum also unchanged. IMPRESSION: 1. Left urinary tract stent is in place. Probable pyelonephritis of the left kidney. Physical Exam:     Slightly confused  NT/ND  Urine clear    Assessment/Plan    Janina Ruiz is a 78 y.o. male admitted 10/24/2022 for  AMS, hypoglycemia, UTI     1) Urinary tract infection: Prior blood culture 8/30 ESBL E. coli, repeat blood culture 9/9 Candida. Was on Fetroja and fluconazole per ID. Urine culture 10/9 ESBL E. coli, on ertapenem as outpatient completed 10/23. UA showing moderate leuks and large blood but negative nitrites and no bacteria seen on microscopy. Urine culture no growth. Blood cultures positive Enterococcus raffinosus. ID consulted with plan reviewed. 2) Chronic urine retention: Managed by suprapubic catheter. 12 [place] SPT exchange at the bedside 10/25  3) Chronic left hydronephrosis: Managed by ureteral stent exchange every 3 months. Recent exchange earlier this month was rescheduled due to ongoing anemia. Plan cystoscopy/left stent exchange in the next few weeks after appropriate abx  4) CRI: stable    Patient seen and examined, chart reviewed.      aDyana Hobbs MD     Electronically signed at 10/29/2022

## 2022-10-29 NOTE — PROGRESS NOTES
Physical Therapy    Physical Therapy Treatment Note  Name: Luis Alberto Mims MRN: [id number] :   1940   Date:  10/29/2022   Admission Date: 10/24/2022 Room:  [address] [place], [place] [address]   Restrictions/Precautions:          Fall risk, mild confusion, suprapubic catheter with temp. Monitor and pt has external catheters , tele, IV, fall risk, Contact  diagnosis was Hypoglycemia  Communication with other providers:  White board in patient room with patient transfer handoff  Co-tx with Steve Mclain per patient safety with tranfers   Subjective:  Patient states:  \"I'm going home\" Patient demo's mild confusion and states and points to the side of the room \"What are we doing about that forklift? \" Patient possibly hallucinating. Pain:   Location, Type, Intensity (0/10 to 10/10):  no c/o during tx session  Objective:    Observation:  Alert and oriented to situation. Unable to give day of the week and demo's mild-mod confusion. Treatment, including education/measures:  Supine to side lying: Initially Max x 2 d/t poor understanding. Progresses to Mod A with good carryover of tranfers initiation of reaching and LTR. Min A to to sustain side lying during cleanup, ~3-5' on ea side  Seated on EOB ~5' max A x 1 d/t retropulsion but progressed to CGA-Min A x 1 after understanding shifting COM over TYSHAWN/forward trunk lean and use of BUE. .   Sit to Stand: HHA Min A x 2 with patient demo'ing good forward translation, BUE effort and BLE effort. X 4 sets. However, patient becomes fearful d/t mild confusion and attempts to sit, declines to Max A x 2 to sustain standing position and max cues to sustain standing. Less than 1 minute tolerating in standing. SPT: Unable to stand pivot d/t fear and poor ability to be in SLE with LE advancement. Squat pivot done from bed to chair moving to strong side/left side max A of 2.   Pt needing max assist of 2 and cues to scoot back in chair     Exercises:   Supine: AP, AAROM heel slide sulema, AAROM Rt SLR, AROM SLR x10 ea  Reclined in recliner: Manual Mod resistance applied to Hip ext (with descend from eccentric SLR) x 6 ea side, mod resisted heel slides x 6 ea side, AP x15    Safety  Patient left safely in the chair with leg rest elevated, matheus under patient, with call light/phone in reach with alarm applied. Gait belt was used for transfers and gait. Assessment / Impression:    Patient's tolerance of treatment:  good   Adverse Reaction: confusion  Significant change in status and impact:  na  Barriers to improvement:  confused, strength, and safety  Plan for Next Session:    Cont. POC  Time in:  1006  Time out:  1049  Timed treatment minutes:  43  Total treatment time:  37    Previously filed items:           Short Term Goals  Time Frame for Short Term Goals: 1 week  Short Term Goal 1: Pt will perform sup>sit with Min A and cues  Short Term Goal 2: Pt will perform standing balance tasks x5' with UE support and Min A  Short Term Goal 3: Pt will AMB x15 ft with RW, chair follow, Mod A    Electronically signed by:     Elyse Rivera PTA   10/29/2022, 10:52 AM

## 2022-10-29 NOTE — PLAN OF CARE
Problem: Discharge Planning  Goal: Discharge to home or other facility with appropriate resources  10/28/2022 2341 by Melvin Boyle RN  Outcome: Progressing   Patient and family involved in plan of care      Problem: Safety - Adult  Goal: Free from fall injury  10/28/2022 2341 by Melvin Boyle RN  Outcome: Progressing   Patient voices understanding of fall precautions      Problem: Confusion  Goal: Confusion, delirium, dementia, or psychosis is improved or at baseline  Description: INTERVENTIONS:  1. Assess for possible contributors to thought disturbance, including medications, impaired vision or hearing, underlying metabolic abnormalities, dehydration, psychiatric diagnoses, and notify attending LIP  2. [place] high risk fall precautions, as indicated  3. Provide frequent short contacts to provide reality reorientation, refocusing and direction  4. Decrease environmental stimuli, including noise as appropriate  5. Monitor and intervene to maintain adequate nutrition, hydration, elimination, sleep and activity  6. If unable to ensure safety without constant attention obtain sitter and review sitter guidelines with assigned personnel  7. Initiate Psychosocial CNS and Spiritual Care consult, as indicated  10/28/2022 2341 by Melvin Boyle RN  Outcome: Progressing   Patient is slightly confused, but easily reoriented.

## 2022-10-30 LAB
ALBUMIN SERPL-MCNC: 3.7 GM/DL (ref 3.4–5)
ALP BLD-CCNC: 94 IU/L (ref 40–128)
ALT SERPL-CCNC: 8 U/L (ref 10–40)
ANION GAP SERPL CALCULATED.3IONS-SCNC: 12 MMOL/L (ref 4–16)
AST SERPL-CCNC: 16 IU/L (ref 15–37)
BILIRUB SERPL-MCNC: 0.3 MG/DL (ref 0–1)
BUN BLDV-MCNC: 42 MG/DL (ref 6–23)
CALCIUM SERPL-MCNC: 9.2 MG/DL (ref 8.3–10.6)
CHLORIDE BLD-SCNC: 106 MMOL/L (ref 99–110)
CO2: 22 MMOL/L (ref 21–32)
CREAT SERPL-MCNC: 1.8 MG/DL (ref 0.9–1.3)
GFR SERPL CREATININE-BSD FRML MDRD: 37 ML/MIN/1.73M2
GLUCOSE BLD-MCNC: 148 MG/DL (ref 70–99)
GLUCOSE BLD-MCNC: 159 MG/DL (ref 70–99)
GLUCOSE BLD-MCNC: 179 MG/DL (ref 70–99)
GLUCOSE BLD-MCNC: 198 MG/DL (ref 70–99)
GLUCOSE BLD-MCNC: 222 MG/DL (ref 70–99)
GLUCOSE BLD-MCNC: 77 MG/DL (ref 70–99)
GLUCOSE BLD-MCNC: 97 MG/DL (ref 70–99)
HCT VFR BLD CALC: 25.7 % (ref 42–52)
HEMOGLOBIN: 8.1 GM/DL (ref 13.5–18)
HIGH SENSITIVE C-REACTIVE PROTEIN: 3.7 MG/L (ref 0–5)
MCH RBC QN AUTO: 30 PG (ref 27–31)
MCHC RBC AUTO-ENTMCNC: 31.5 % (ref 32–36)
MCV RBC AUTO: 95.2 FL (ref 78–100)
PDW BLD-RTO: 16.3 % (ref 11.7–14.9)
PLATELET # BLD: 266 K/CU MM (ref 140–440)
PMV BLD AUTO: 11.7 FL (ref 7.5–11.1)
POTASSIUM SERPL-SCNC: 4.4 MMOL/L (ref 3.5–5.1)
PROCALCITONIN: 1.41
RBC # BLD: 2.7 M/CU MM (ref 4.6–6.2)
SODIUM BLD-SCNC: 140 MMOL/L (ref 135–145)
TOTAL PROTEIN: 6.6 GM/DL (ref 6.4–8.2)
WBC # BLD: 8.3 K/CU MM (ref 4–10.5)

## 2022-10-30 PROCEDURE — 6370000000 HC RX 637 (ALT 250 FOR IP): Performed by: PHYSICIAN ASSISTANT

## 2022-10-30 PROCEDURE — 6360000002 HC RX W HCPCS: Performed by: NURSE PRACTITIONER

## 2022-10-30 PROCEDURE — 36415 COLL VENOUS BLD VENIPUNCTURE: CPT

## 2022-10-30 PROCEDURE — 85027 COMPLETE CBC AUTOMATED: CPT

## 2022-10-30 PROCEDURE — 94761 N-INVAS EAR/PLS OXIMETRY MLT: CPT

## 2022-10-30 PROCEDURE — 80053 COMPREHEN METABOLIC PANEL: CPT

## 2022-10-30 PROCEDURE — 84145 PROCALCITONIN (PCT): CPT

## 2022-10-30 PROCEDURE — 6360000002 HC RX W HCPCS: Performed by: PHYSICIAN ASSISTANT

## 2022-10-30 PROCEDURE — 6370000000 HC RX 637 (ALT 250 FOR IP): Performed by: NURSE PRACTITIONER

## 2022-10-30 PROCEDURE — 80048 BASIC METABOLIC PNL TOTAL CA: CPT

## 2022-10-30 PROCEDURE — 2580000003 HC RX 258: Performed by: NURSE PRACTITIONER

## 2022-10-30 PROCEDURE — 2140000000 HC CCU INTERMEDIATE R&B

## 2022-10-30 PROCEDURE — 82962 GLUCOSE BLOOD TEST: CPT

## 2022-10-30 PROCEDURE — 2580000003 HC RX 258: Performed by: PHYSICIAN ASSISTANT

## 2022-10-30 PROCEDURE — 86141 C-REACTIVE PROTEIN HS: CPT

## 2022-10-30 RX ADMIN — SODIUM CHLORIDE, PRESERVATIVE FREE 10 ML: 5 INJECTION INTRAVENOUS at 20:49

## 2022-10-30 RX ADMIN — ASPIRIN 81 MG CHEWABLE TABLET 81 MG: 81 TABLET CHEWABLE at 20:49

## 2022-10-30 RX ADMIN — AMPICILLIN SODIUM 1000 MG: 1 INJECTION, POWDER, FOR SOLUTION INTRAMUSCULAR; INTRAVENOUS at 15:40

## 2022-10-30 RX ADMIN — LEVOTHYROXINE SODIUM 75 MCG: 0.07 TABLET ORAL at 05:47

## 2022-10-30 RX ADMIN — ENOXAPARIN SODIUM 40 MG: 40 INJECTION SUBCUTANEOUS at 09:05

## 2022-10-30 RX ADMIN — CARVEDILOL 6.25 MG: 6.25 TABLET, FILM COATED ORAL at 16:57

## 2022-10-30 RX ADMIN — AMPICILLIN SODIUM 1000 MG: 1 INJECTION, POWDER, FOR SOLUTION INTRAMUSCULAR; INTRAVENOUS at 00:20

## 2022-10-30 RX ADMIN — SODIUM CHLORIDE 25 ML: 9 INJECTION, SOLUTION INTRAVENOUS at 15:39

## 2022-10-30 RX ADMIN — SODIUM CHLORIDE: 9 INJECTION, SOLUTION INTRAVENOUS at 12:52

## 2022-10-30 RX ADMIN — CARVEDILOL 6.25 MG: 6.25 TABLET, FILM COATED ORAL at 09:05

## 2022-10-30 RX ADMIN — AMPICILLIN SODIUM 1000 MG: 1 INJECTION, POWDER, FOR SOLUTION INTRAMUSCULAR; INTRAVENOUS at 23:19

## 2022-10-30 RX ADMIN — SODIUM CHLORIDE, PRESERVATIVE FREE 10 ML: 5 INJECTION INTRAVENOUS at 09:06

## 2022-10-30 RX ADMIN — AMPICILLIN SODIUM 1000 MG: 1 INJECTION, POWDER, FOR SOLUTION INTRAMUSCULAR; INTRAVENOUS at 20:51

## 2022-10-30 RX ADMIN — SODIUM CHLORIDE: 9 INJECTION, SOLUTION INTRAVENOUS at 09:51

## 2022-10-30 RX ADMIN — INSULIN LISPRO 4 UNITS: 100 INJECTION, SOLUTION INTRAVENOUS; SUBCUTANEOUS at 00:23

## 2022-10-30 RX ADMIN — FERROUS SULFATE TAB 325 MG (65 MG ELEMENTAL FE) 325 MG: 325 (65 FE) TAB at 09:05

## 2022-10-30 RX ADMIN — PANTOPRAZOLE SODIUM 40 MG: 40 TABLET, DELAYED RELEASE ORAL at 05:47

## 2022-10-30 RX ADMIN — AMPICILLIN SODIUM 1000 MG: 1 INJECTION, POWDER, FOR SOLUTION INTRAMUSCULAR; INTRAVENOUS at 09:07

## 2022-10-30 RX ADMIN — AMPICILLIN SODIUM 1000 MG: 1 INJECTION, POWDER, FOR SOLUTION INTRAMUSCULAR; INTRAVENOUS at 12:54

## 2022-10-30 RX ADMIN — INSULIN LISPRO 4 UNITS: 100 INJECTION, SOLUTION INTRAVENOUS; SUBCUTANEOUS at 20:50

## 2022-10-30 RX ADMIN — AMPICILLIN SODIUM 1000 MG: 1 INJECTION, POWDER, FOR SOLUTION INTRAMUSCULAR; INTRAVENOUS at 04:15

## 2022-10-30 NOTE — PROGRESS NOTES
V2.0  [facility] [place]      Name:  Des Enriquez /Age/Sex: 1940  (78 y.o. male)   MRN & CSN:  [id number] & [id number] Encounter Date/Time: 10/30/2022 2:03 PM EDT    Location:  [address] [place], [place] [id number] PCP: Sabina Bradshaw MD       Hospital Day: 7    Assessment and Plan:   Des Enriquez is a 78 y.o. male  who presents with Hypoglycemia      Plan:  Acute severe and refractory hypoglycemia, can be secondary to decreased p.o. intake, blood sugars better in the last 24 hours. Acute metabolic encephalopathy S/T severe and refractory hypoglycemia, improving  CT head no acute process  CT chest, abdomen, pelvis ordered showed probable pyelonephritis of L kidney, currently on meropenm, will discontinue plan for cystoscopy/left ureteral stent exchange as outpatient    3. Enterococcus raffinosus bacteremia: 1 out of 4 bottles were positive for it, will start patient on ampicillin and consult ID. Recommends repeat blood cultures until negative. 4. Elevated troponin  trop T 0.044, chronically elevated in setting of CKD, EKG with sinus bradycardia, no acute ST T wave changes    5. Chronic anemia - Hgb 8.0, near baseline   6. CKDIII Cr 2.0, near baseline avoid nephrotoxins     Diet ADULT DIET; Regular; 5 carb choices (75 gm/meal)   DVT Prophylaxis [x] Lovenox, []  Heparin, [] SCDs, [] Ambulation,  [] Eliquis, [] Xarelto  [] Coumadin   Code Status Full Code   Disposition From: SNF  Expected Disposition: SNF  Estimated Date of Discharge: 3 days  Patient requires continued admission due to    Surrogate Decision Maker/ POA      Subjective:     Chief Complaint: Hypoglycemia (39 per ems upon arrival. Glucagon given at nursing home and D10 given per medics)       Des Enriquez is a 78 y.o. male who presents with hypoglycemia. Alert and awake this morning, follows commands, feels better.  Denies any headache, nausea, vomiting, chest pain, shortness of breath, abdominal pain, palpitations, urinary or bowel changes. Review of Systems:    Review of Systems  10 point ROS negative except as noted above. Objective: Intake/Output Summary (Last 24 hours) at 10/30/2022 1135  Last data filed at 10/30/2022 0428  Gross per 24 hour   Intake --   Output 750 ml   Net -750 ml          Vitals:   Vitals:    10/30/22 0900   BP: (!) 149/92   Pulse: 77   Resp: 13   Temp: 97.8 °F (36.6 °C)   SpO2: 100%       Physical Exam:   Physical Exam   GEN    patient is awake appears stated age, not in respiratory distress  EYES   Pupils are equally round. No scleral erythema, discharge, or conjunctivitis. HENT  Mucous membranes are moist.  NECK  Supple, no apparent thyromegaly or masses. RESP  Clear to auscultation, no wheezes, rales or rhonchi. Respirations even and unlabored on RA. CARDIO/VASC   S1/S2 auscultated. Regular rate without appreciable murmurs, rubs, or gallops. Peripheral pulses equal bilaterally and palpable. No peripheral edema. GI        Abdomen is soft without significant tenderness, masses, or guarding. Suprapubic catheter noted, site looks clean and dry.        No costovertebral angle tenderness. MSK    No gross joint deformities. SKIN    Normal coloration, warm, dry.   NEURO: Awake and alert, follows commands alert and oriented to time and person PSYCH    mood appears normal  Medications:   Medications:    ampicillin IV  1,000 mg IntraVENous 6 times per day    insulin lispro  0-16 Units SubCUTAneous Q4H    insulin lispro  0-4 Units SubCUTAneous Nightly    pantoprazole  40 mg Oral QAM AC    sodium chloride flush  5-40 mL IntraVENous 2 times per day    enoxaparin  40 mg SubCUTAneous Daily    dextrose bolus  250 mL IntraVENous Once    carvedilol  6.25 mg Oral BID WC    ferrous sulfate  325 mg Oral Daily with breakfast    torsemide  20 mg Oral Once per day on Mon Wed Fri    levothyroxine  75 mcg Oral Daily    aspirin  81 mg Oral Nightly      Infusions:    sodium chloride Stopped (10/30/22 1038)    dextrose PRN Meds: dextrose bolus, 125 mL, PRN   Or  dextrose bolus, 250 mL, PRN  sodium chloride flush, 5-40 mL, PRN  sodium chloride, , PRN  ondansetron, 4 mg, Q8H PRN   Or  ondansetron, 4 mg, Q6H PRN  polyethylene glycol, 17 g, Daily PRN  acetaminophen, 650 mg, Q6H PRN   Or  acetaminophen, 650 mg, Q6H PRN  glucose, 4 tablet, PRN  dextrose bolus, 125 mL, PRN   Or  dextrose bolus, 250 mL, PRN  glucagon (rDNA), 1 mg, PRN  dextrose, , Continuous PRN      Labs      Recent Results (from the past 24 hour(s))   POCT Glucose    Collection Time: 10/29/22  4:50 PM   Result Value Ref Range    POC Glucose 281 (H) 70 - 99 MG/DL   POCT Glucose    Collection Time: 10/29/22  8:43 PM   Result Value Ref Range    POC Glucose 215 (H) 70 - 99 MG/DL   POCT Glucose    Collection Time: 10/29/22 11:43 PM   Result Value Ref Range    POC Glucose 218 (H) 70 - 99 MG/DL   POCT Glucose    Collection Time: 10/30/22  4:12 AM   Result Value Ref Range    POC Glucose 97 70 - 99 MG/DL   CBC    Collection Time: 10/30/22  5:05 AM   Result Value Ref Range    WBC 8.3 4.0 - 10.5 K/CU MM    RBC 2.70 (L) 4.6 - 6.2 M/CU MM    Hemoglobin 8.1 (L) 13.5 - 18.0 GM/DL    Hematocrit 25.7 (L) 42 - 52 %    MCV 95.2 78 - 100 FL    MCH 30.0 27 - 31 PG    MCHC 31.5 (L) 32.0 - 36.0 %    RDW 16.3 (H) 11.7 - 14.9 %    Platelets 796 390 - 782 K/CU MM    MPV 11.7 (H) 7.5 - 11.1 FL   C-Reactive Protein    Collection Time: 10/30/22  5:05 AM   Result Value Ref Range    CRP, High Sensitivity 3.7 0.0 - 5.0 mg/L   Comprehensive Metabolic Panel w/ Reflex to MG    Collection Time: 10/30/22  5:05 AM   Result Value Ref Range    Sodium 140 135 - 145 MMOL/L    Potassium 4.4 3.5 - 5.1 MMOL/L    Chloride 106 99 - 110 mMol/L    CO2 22 21 - 32 MMOL/L    BUN 42 (H) 6 - 23 MG/DL    Creatinine 1.8 (H) 0.9 - 1.3 MG/DL    Est, Glom Filt Rate 37 (L) >60 mL/min/1.73m2    Glucose 77 70 - 99 MG/DL    Calcium 9.2 8.3 - 10.6 MG/DL    Albumin 3.7 3.4 - 5.0 GM/DL    Total Protein 6.6 6.4 - 8.2 GM/DL Total Bilirubin 0.3 0.0 - 1.0 MG/DL    ALT 8 (L) 10 - 40 U/L    AST 16 15 - 37 IU/L    Alkaline Phosphatase 94 40 - 128 IU/L    Anion Gap 12 4 - 16   Procalcitonin    Collection Time: 10/30/22  5:05 AM   Result Value Ref Range    Procalcitonin 1.41    POCT Glucose    Collection Time: 10/30/22 10:03 AM   Result Value Ref Range    POC Glucose 159 (H) 70 - 99 MG/DL        Imaging/Diagnostics Last 24 Hours   CT HEAD WO CONTRAST    Result Date: 10/24/2022  EXAMINATION: CT OF THE HEAD WITHOUT CONTRAST  10/24/2022 12:41 pm TECHNIQUE: CT of the head was performed without the administration of intravenous contrast. Automated exposure control, iterative reconstruction, and/or weight based adjustment of the mA/kV was utilized to reduce the radiation dose to as low as reasonably achievable. COMPARISON: 04/18/2022 HISTORY: ORDERING SYSTEM PROVIDED HISTORY: HEAD TRAUMA, CLOSED, MILD, GCS >= 13, NO RISK FACTORS, NEURO EXAM NORMAL TECHNOLOGIST PROVIDED HISTORY: Has a \"code stroke\" or \"stroke alert\" been called? ->No Reason for exam:->slurred speech Decision Support Exception - unselect if not a suspected or confirmed emergency medical condition->Emergency Medical Condition (MA) Reason for Exam: slurred speech, Head trauma, closed, mild, GCS >= 13, no risk factors, neuro exam normal FINDINGS: BRAIN/VENTRICLES: There is no acute intracranial hemorrhage, mass effect or midline shift. No abnormal extra-axial fluid collection. The gray-white differentiation is maintained without evidence of an acute infarct. There is no evidence of hydrocephalus. Mild chronic microvascular ischemic change. ORBITS: The visualized portion of the orbits demonstrate no acute abnormality. SINUSES: The visualized paranasal sinuses and mastoid air cells demonstrate no acute abnormality. SOFT TISSUES/SKULL:  No acute abnormality of the visualized skull or soft tissues. No acute intracranial abnormality.      XR CHEST PORTABLE    Result Date: 10/24/2022  EXAMINATION: ONE XRAY VIEW OF THE CHEST 10/24/2022 11:41 am COMPARISON: 09/10/2022. HISTORY: ORDERING SYSTEM PROVIDED HISTORY: stroke symptoms TECHNOLOGIST PROVIDED HISTORY: Reason for exam:->stroke symptoms Reason for Exam: STROKE SYMPTOMS FINDINGS: The heart size is within normal limits. The pulmonary vasculature is also within normal limits. No acute infiltrates are seen. The costophrenic angles are sharp bilaterally. No pneumothoraces are noted. There has been removal of the right central venous line. 1. No active pulmonary disease. CT CHEST ABDOMEN PELVIS W CONTRAST Additional Contrast? None    Result Date: 10/24/2022  EXAMINATION: CT OF THE CHEST, ABDOMEN, AND PELVIS WITH CONTRAST 10/24/2022 5:51 pm TECHNIQUE: CT of the chest, abdomen and pelvis was performed with the administration of intravenous contrast. Multiplanar reformatted images are provided for review. Automated exposure control, iterative reconstruction, and/or weight based adjustment of the mA/kV was utilized to reduce the radiation dose to as low as reasonably achievable. COMPARISON: CT abdomen and pelvis 09/10/2022 HISTORY: ORDERING SYSTEM PROVIDED HISTORY: etiology of sepsis TECHNOLOGIST PROVIDED HISTORY: Reason for exam:->etiology of sepsis Additional Contrast?->None Reason for Exam: etiology of sepsis Additional signs and symptoms: no Relevant Medical/Surgical History: 80 ml isovue 370 used FINDINGS: Chest: Mediastinum: No enlarged lymphadenopathy. Small lymph nodes are scattered. Small fixed hiatus hernia. Minimal wall thickening of the esophagus. Heart: Heart size is normal. No pericardial effusion. Great vessels: the great vessels are patent and unremarkable. Aorta: Aorta is patent and unremarkable. Negative for aneurysm or dissection. Lungs/pleura: Minimal opacities in the lower lobes. Trachea and bronchi are patent. Minimal paraseptal emphysema. No consolidation. Soft Tissues: No enlarged axillary adenopathy.  No subcutaneous mass. Thyroid is unremarkable. Appearance of the line in the left arm. However, the tip is in the distal subclavian vein. Osseous: No suspicious lytic or sclerotic lesions. Negative for pneumonia. Mild bibasilar opacities which are most likely atelectasis. Small fixed hiatus hernia. Abdomen/Pelvis: Liver: Liver is normal density. No enhancing masses. Normal enhancement of the intrahepatic vasculature. Spleen:  Normal size. No enhancing masses calcified granulomata in the spleen. Pancreas: No enhancing masses. No ductal dilation. No adjacent fatty stranding. Gallbladder no calcified stones or sludge. No pericholecystic fluid. No wall thickening. Bile ducts: No biliary ductal dilation Adrenals: The adrenal glands are unremarkable Kidneys: Urinary tract stent on the left. The left kidney is mildly atrophic. Perinephric stranding on the left and mild decreased enhancement of the cortex when compared to the right. However, no hydronephrosis. No large mass. Heterogeneity of the contrast enhancement on the left. Left urinary tract stent extends into the bladder. No stones along the pathway of the left ureter. GI: No small bowel dilation. No colonic wall thickening. No large mass. The stomach is unremarkable in appearance but it is underdistended. Moderate amount of stool throughout the colon. Mesentery: No enlarged lymphadenopathy. No free fluid. No free gas. Aorta: Aorta is of normal size. Negative for dissection. IVC is unremarkable. Celiac axis and SMA are patent. Portal vein is patent. Atherosclerotic change of the aorta in the renal arteries. PELVIS GI: No small bowel dilation. No colonic wall thickening. No enlarged lymphadenopathy. Small amount of free fluid in the pelvis. : The bladder is unremarkable in appearance. No large mass. Suprapubic catheter. The bladder is completely decompressed. Prostate appears to have been resected. Phleboliths in the pelvis.   Multiple clips in the pelvis. No enlarged lymphadenopathy. Osseous: Bone island in the right ilium. No lytic destructive lesions. Bone island in the right acetabulum also unchanged. IMPRESSION: 1. Left urinary tract stent is in place. Probable pyelonephritis of the left kidney.        Electronically signed by Sophie Darden MD on 10/30/2022 at 11:35 AM

## 2022-10-31 LAB
GLUCOSE BLD-MCNC: 118 MG/DL (ref 70–99)
GLUCOSE BLD-MCNC: 130 MG/DL (ref 70–99)
GLUCOSE BLD-MCNC: 154 MG/DL (ref 70–99)
GLUCOSE BLD-MCNC: 266 MG/DL (ref 70–99)
GLUCOSE BLD-MCNC: 82 MG/DL (ref 70–99)
HIGH SENSITIVE C-REACTIVE PROTEIN: 4.5 MG/L (ref 0–5)

## 2022-10-31 PROCEDURE — 2580000003 HC RX 258: Performed by: PHYSICIAN ASSISTANT

## 2022-10-31 PROCEDURE — 94761 N-INVAS EAR/PLS OXIMETRY MLT: CPT

## 2022-10-31 PROCEDURE — 2580000003 HC RX 258: Performed by: NURSE PRACTITIONER

## 2022-10-31 PROCEDURE — 6360000002 HC RX W HCPCS: Performed by: NURSE PRACTITIONER

## 2022-10-31 PROCEDURE — 6370000000 HC RX 637 (ALT 250 FOR IP): Performed by: NURSE PRACTITIONER

## 2022-10-31 PROCEDURE — 86141 C-REACTIVE PROTEIN HS: CPT

## 2022-10-31 PROCEDURE — 6360000002 HC RX W HCPCS: Performed by: PHYSICIAN ASSISTANT

## 2022-10-31 PROCEDURE — 82962 GLUCOSE BLOOD TEST: CPT

## 2022-10-31 PROCEDURE — 99232 SBSQ HOSP IP/OBS MODERATE 35: CPT | Performed by: NURSE PRACTITIONER

## 2022-10-31 PROCEDURE — 6370000000 HC RX 637 (ALT 250 FOR IP): Performed by: PHYSICIAN ASSISTANT

## 2022-10-31 PROCEDURE — 97530 THERAPEUTIC ACTIVITIES: CPT

## 2022-10-31 PROCEDURE — 36415 COLL VENOUS BLD VENIPUNCTURE: CPT

## 2022-10-31 PROCEDURE — 1200000000 HC SEMI PRIVATE

## 2022-10-31 PROCEDURE — 87040 BLOOD CULTURE FOR BACTERIA: CPT

## 2022-10-31 RX ADMIN — ENOXAPARIN SODIUM 40 MG: 40 INJECTION SUBCUTANEOUS at 09:01

## 2022-10-31 RX ADMIN — AMPICILLIN SODIUM 1000 MG: 1 INJECTION, POWDER, FOR SOLUTION INTRAMUSCULAR; INTRAVENOUS at 22:36

## 2022-10-31 RX ADMIN — SODIUM CHLORIDE 25 ML: 9 INJECTION, SOLUTION INTRAVENOUS at 13:14

## 2022-10-31 RX ADMIN — SODIUM CHLORIDE 25 ML: 9 INJECTION, SOLUTION INTRAVENOUS at 09:03

## 2022-10-31 RX ADMIN — ASPIRIN 81 MG CHEWABLE TABLET 81 MG: 81 TABLET CHEWABLE at 22:32

## 2022-10-31 RX ADMIN — AMPICILLIN SODIUM 1000 MG: 1 INJECTION, POWDER, FOR SOLUTION INTRAMUSCULAR; INTRAVENOUS at 09:06

## 2022-10-31 RX ADMIN — AMPICILLIN SODIUM 1000 MG: 1 INJECTION, POWDER, FOR SOLUTION INTRAMUSCULAR; INTRAVENOUS at 13:15

## 2022-10-31 RX ADMIN — SODIUM CHLORIDE, PRESERVATIVE FREE 10 ML: 5 INJECTION INTRAVENOUS at 09:08

## 2022-10-31 RX ADMIN — LEVOTHYROXINE SODIUM 75 MCG: 0.07 TABLET ORAL at 06:32

## 2022-10-31 RX ADMIN — AMPICILLIN SODIUM 1000 MG: 1 INJECTION, POWDER, FOR SOLUTION INTRAMUSCULAR; INTRAVENOUS at 15:38

## 2022-10-31 RX ADMIN — CARVEDILOL 6.25 MG: 6.25 TABLET, FILM COATED ORAL at 09:01

## 2022-10-31 RX ADMIN — SODIUM CHLORIDE 25 ML: 9 INJECTION, SOLUTION INTRAVENOUS at 15:37

## 2022-10-31 RX ADMIN — SODIUM CHLORIDE, PRESERVATIVE FREE 10 ML: 5 INJECTION INTRAVENOUS at 22:37

## 2022-10-31 RX ADMIN — INSULIN LISPRO 8 UNITS: 100 INJECTION, SOLUTION INTRAVENOUS; SUBCUTANEOUS at 15:38

## 2022-10-31 RX ADMIN — PANTOPRAZOLE SODIUM 40 MG: 40 TABLET, DELAYED RELEASE ORAL at 06:32

## 2022-10-31 RX ADMIN — TORSEMIDE 20 MG: 20 TABLET ORAL at 09:01

## 2022-10-31 RX ADMIN — SODIUM CHLORIDE: 9 INJECTION, SOLUTION INTRAVENOUS at 22:31

## 2022-10-31 RX ADMIN — FERROUS SULFATE TAB 325 MG (65 MG ELEMENTAL FE) 325 MG: 325 (65 FE) TAB at 09:01

## 2022-10-31 RX ADMIN — AMPICILLIN SODIUM 1000 MG: 1 INJECTION, POWDER, FOR SOLUTION INTRAMUSCULAR; INTRAVENOUS at 03:43

## 2022-10-31 NOTE — PROGRESS NOTES
Infectious Disease Progress Note  10/31/2022   Patient Name: Siri Campos : 1940   Impression  Enterococcus raffinosus in  Blood Cultures, Pathogen vs Contaminants:  Left Pyelonephritis Secondary to Chronic Left Hydronephrosis, Chronic Urinary Retention:  Recent Fungemia 22:  Recent Recurrent ESBL E.coli:  Afebrile, no leukocytosis  CRP remains low  10/24-Urine culture: NGTD  10/24-BC  Enterococcus raffinosus   10/24-CT chest, A&P W IV Contrast: Negative for pneumonia. Mild bibasilar opacities which are most likely   atelectasis. Small fixed hiatus hernia. 1. Left urinary tract stent is in place. Probable pyelonephritis of the left   Kidney  Dr. Steve Bravo onboard for urology, rrec cystoscopy/left stent exchange in the next few weeks after appropriate ABX. Mgt with ureteral stent exchange every 3 months. DMII:  Delirium:  CKD3:  Tobacco Abuse:  H/o Prostate Cancer S/p Prostatectomy :  HTN:  Dementia:  Multi-morbidity: per PMHx: s/p cervical spine fusion, recurrent UTIs with SPC  Plan:  Continue IV ampicillin 1 gm q4h  Trend CRP and Pct, ordered  Repeat BC until negative at 48 hours  Ordered repeat [facility] 10/28, they were not obtained  Await repeat BC re-ordered on 10/31, if negative, will DC ampicillin    Ongoing Antimicrobial Therapy  Ampicillin 10/28-? Completed Antimicrobial Therapy  Meropenem 10/24-?? History:? Interval history noted. Chief complaint: E.raffinosus in blood cultures. Possible left pyelonephritis. Denies n/v/d/f or untoward effects of antibiotics  Physical Exam:  Vital Signs: BP (!) 148/65   Pulse 77   Temp 98.1 °F (36.7 °C) (Oral)   Resp 13   Ht 5' 8\" (1.727 m)   Wt 197 lb 1.5 oz (89.4 kg)   SpO2 98%   BMI 29.97 kg/m²     Gen: A&O x 4  Chest: no distress and CTA. Good air movement. Heart: RRR and no MRG. Abd: soft, non-distended, left CVA tenderness present, no hepatomegaly. Normoactive bowel sounds.   Ext: no clubbing, cyanosis, or edema  Supra-Pubic Catheter Site: without erythema or tenderness draining clear yellow urine  Neuro: Mental status intact. CN 2-12 intact and no focal sensory or motor deficits     Radiologic / Imaging / TESTING  1. No active pulmonary disease. 10/24/22 CT Head WO Contrast:  Impression   No acute intracranial abnormality. 10/24/22 CT Chest Abdomen Pelvis W Contrast:  Impression       Negative for pneumonia. Mild bibasilar opacities which are most likely   atelectasis. Small fixed hiatus hernia. Abdomen/Pelvis:       Liver: Liver is normal density. No enhancing masses. Normal enhancement of   the intrahepatic vasculature. Spleen:  Normal size. No enhancing masses calcified granulomata in the   spleen. Pancreas: No enhancing masses. No ductal dilation. No adjacent fatty   stranding. Gallbladder no calcified stones or sludge. No pericholecystic fluid. No   wall thickening. Bile ducts: No biliary ductal dilation       Adrenals: The adrenal glands are unremarkable       Kidneys: Urinary tract stent on the left. The left kidney is mildly   atrophic. Perinephric stranding on the left and mild decreased enhancement   of the cortex when compared to the right. However, no hydronephrosis. No   large mass. Heterogeneity of the contrast enhancement on the left. Left   urinary tract stent extends into the bladder. No stones along the pathway of   the left ureter. GI: No small bowel dilation. No colonic wall thickening. No large mass. The stomach is unremarkable in appearance but it is underdistended. Moderate   amount of stool throughout the colon. Mesentery: No enlarged lymphadenopathy. No free fluid. No free gas. Aorta: Aorta is of normal size. Negative for dissection. IVC is   unremarkable. Celiac axis and SMA are patent. Portal vein is patent. Atherosclerotic change of the aorta in the renal arteries. PELVIS       GI: No small bowel dilation.   No colonic wall thickening. No enlarged   lymphadenopathy. Small amount of free fluid in the pelvis. : The bladder is unremarkable in appearance. No large mass. Suprapubic   catheter. The bladder is completely decompressed. Prostate appears to have   been resected. Phleboliths in the pelvis. Multiple clips in the pelvis. No   enlarged lymphadenopathy. Osseous: Bone island in the right ilium. No lytic destructive lesions. Bone   island in the right acetabulum also unchanged. IMPRESSION:   1. Left urinary tract stent is in place. Probable pyelonephritis of the left   kidney.         Labs:    Recent Results (from the past 24 hour(s))   POCT Glucose    Collection Time: 10/30/22  3:37 PM   Result Value Ref Range    POC Glucose 198 (H) 70 - 99 MG/DL   POCT Glucose    Collection Time: 10/30/22  8:01 PM   Result Value Ref Range    POC Glucose 222 (H) 70 - 99 MG/DL   POCT Glucose    Collection Time: 10/30/22 11:16 PM   Result Value Ref Range    POC Glucose 148 (H) 70 - 99 MG/DL   C-Reactive Protein    Collection Time: 10/31/22  1:51 AM   Result Value Ref Range    CRP, High Sensitivity 4.5 0.0 - 5.0 mg/L   POCT Glucose    Collection Time: 10/31/22  3:40 AM   Result Value Ref Range    POC Glucose 118 (H) 70 - 99 MG/DL   POCT Glucose    Collection Time: 10/31/22  9:07 AM   Result Value Ref Range    POC Glucose 130 (H) 70 - 99 MG/DL   POCT Glucose    Collection Time: 10/31/22 11:53 AM   Result Value Ref Range    POC Glucose 154 (H) 70 - 99 MG/DL     CULTURE results: Invalid input(s): BLOOD CULTURE,  URINE CULTURE, SURGICAL CULTURE    Diagnosis:  Patient Active Problem List   Diagnosis    Gait disturbance    Generalized weakness    Diabetes mellitus (HCC)    Osteoarthritis    Anemia of chronic disorder    Infected implanted bladder sphincter cuff    Escherichia coli urinary tract infection    Hypertension    Sepsis ([facility] [address]    Acute encephalopathy    Type 2 diabetes mellitus with hypoglycemia without coma [facility])    UTI (urinary tract infection) due to urinary indwelling catheter (HCC)    Hyperkalemia    Acute kidney failure (HCC)    Leg weakness, bilateral    Type 2 diabetes mellitus with neurological manifestations, uncontrolled    Complicated UTI (urinary tract infection)    Essential hypertension    Severe muscle deconditioning    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 (HCC)    Frequent falls    Chronic kidney disease, stage 3a ([address]    Uncontrolled type 2 diabetes mellitus with peripheral neuropathy    Prostate cancer ([facility] [address]    Suprapubic catheter ([facility] [address]    Sepsis due to urinary tract infection ([facility] [address]    Coagulase negative Staphylococcus bacteremia    Chronic kidney disease, stage IV (severe) (HCC)    Acute metabolic encephalopathy    SVT (supraventricular tachycardia) ([facility])    Metabolic encephalopathy    History of prostate cancer    Cigarette nicotine dependence without complication    Altered mental status    Serratia marcescens infection    Hypoglycemia    Hospital discharge follow-up    Fungemia    Pyelonephritis    Bacteremia due to Enterococcus       Active Problems  Principal Problem:    Hypoglycemia  Active Problems:    Pyelonephritis    Bacteremia due to Enterococcus  Resolved Problems:    * No resolved hospital problems. *    Electronically signed by: Electronically signed by Elidia Graf.  VIVIANA Tate CNP on 10/31/2022 at 3:28 PM

## 2022-10-31 NOTE — PROGRESS NOTES
Comprehensive Nutrition Assessment    Type and Reason for Visit:  Initial (los 7 d)    Nutrition Recommendations/Plan:   Continue Carb Control 5 Diet  Begin diabetic oral nutrition supplement bid, between meals  Assist with feeding as needed     Malnutrition Assessment:  Malnutrition Status: At risk for malnutrition (10/31/22 1608)    Context:  Chronic Illness       Nutrition Assessment:    Pt admitted from [facility] with AMS and hypoglycemia. H/O DM, prostate cancer. Mental status improved and Pt eating well at recent meals, needs set up and assistance with feeding, consumng 76% to all of Carb Control 5 Diet. 7% wt loss noted over the past 11 months, is not clinically significant for malnutrition but is of concern. Will add oral supplement and continue to follow as moderate nutrition risk. Nutrition Related Findings:    A1c 7.8, Glu 118-266 Wound Type: None       Current Nutrition Intake & Therapies:    Average Meal Intake: %     ADULT DIET; Regular; 5 carb choices (75 gm/meal)    Anthropometric Measures:  Height: 5' 8\" (172.7 cm)  Ideal Body Weight (IBW): 154 lbs (70 kg)    Admission Body Weight: 201 lb 15.1 oz (91.6 kg)  Current Body Weight: 197 lb 1.5 oz (89.4 kg), 128 % IBW. Weight Source: Bed Scale  Current BMI (kg/m2): 30  Usual Body Weight: 211 lb 10 oz (96 kg) (11/27/21)  % Weight Change (Calculated): -6.9                    BMI Categories: Obese Class 1 (BMI 30.0-34. 9)    Estimated Daily Nutrient Needs:  Energy Requirements Based On: Formula  Weight Used for Energy Requirements: Current  Energy (kcal/day): 1900 ([employer])  Weight Used for Protein Requirements: Ideal  Protein (g/day): 70-84 (1-1.2 g/kg IBW)  Method Used for Fluid Requirements: 1 ml/kcal  Fluid (ml/day): 1900    Nutrition Diagnosis:   Predicted inadequate energy intake related to endocrine dysfuntion as evidenced by weight loss    Nutrition Interventions:   Food and/or Nutrient Delivery: Continue Current Diet, Start Oral Nutrition Supplement  Nutrition Education/Counseling: No recommendation at this time  Coordination of Nutrition Care: Continue to monitor while inpatient, Feeding Assistance/Environment Change       Goals:     Goals: Meet at least 75% of estimated needs       Nutrition Monitoring and Evaluation:   Behavioral-Environmental Outcomes: None Identified  Food/Nutrient Intake Outcomes: Food and Nutrient Intake, Supplement Intake  Physical Signs/Symptoms Outcomes: Biochemical Data, Meal Time Behavior, Nutrition Focused Physical Findings, Weight    Discharge Planning:    Continue Oral Nutrition Supplement     Shazia Lacy, [address], [place]  Contact: [phone]

## 2022-10-31 NOTE — PROGRESS NOTES
Occupational Therapy  . Occupational Therapy Treatment Note    Name: Yoselyn Alfaro MRN: [id number] :   1940   Date:  10/31/2022   Admission Date: 10/24/2022 Room:  [address] [place] [address]-A     Primary Problem:  The primary encounter diagnosis was Hypoglycemia. A diagnosis of Hypothermia, initial encounter was also pertinent to this visit. Pt  has a past medical history of Acute urinary tract infection, Ataxia, Diabetes mellitus ([address] Fusion of spine of cervical region, Gait disturbance, History of prostate cancer, History of tobacco use, Hyperlipidemia, and Osteoarthritis. Restrictions/Precautions:          general precautions, fall risk, tele, external cath     Communication with other providers: Per chart review, patient is appropriate for therapeutic intervention. Co-Tx c PT Cherylynn Cranker. Subjective:  Patient states:  Pt agreeable to Tx session, including transfer up to chair. Pain:   Location, Type, Intensity (0/10 to 10/10):  0/10, denies pain at rest    Objective:    Observation: Pt received in semi-fowlers in bed, A&O to self / situation, family members present. Objective Measures:  Telemetry, external catheter, IV's    Treatment, including education:  Therapeutic Activity Training:   Therapeutic activity training was instructed today. Cues were given for safety, sequence, UE/LE placement, awareness, and balance. Activities performed today included bed mobility training, sup-sit, sit-stand, SPT. Mod A x2 + cues for sequencing for supine to sit at EOB. Mod A x1 for B hips to scoot to EOB. Sitting balance / tolerance: Initially Min A + cues for correcting R-side lean and for sitting upright at EOB, progressed to SBA and intermittent CGA + verbal / tactile cues to correct posterior leans. Tolerated >6 minutes sitting EOB. Sit to stands: Failed 1st attempt, successful stand c Mod A x2 c RW + cues for safe body positioning / hand placement to stand at EOB.    Pt tolerated static stand at EOB x2 minutes c Min Ax1 + CGA x1 for safety. Stand Step Transfer: Min A x2 c RW for steps x4 from EOB to bedside chair + mod verbal / tactile cues for safe body positioning / sequencing. Stand to sit: Min A x2 c RW    All therapeutic intervention performed c emphasis on dynamic balance / standing tolerance to inc strength, endurance and act tolerance for inc Indep c ADL tasks, func transfers / mobility. Safety  Patient safely in bedside chair + alarm placed at end of session, with call light/phone in reach, and nursing aware. Gait belt was used for func transfers / mobility. Family members updated / whiteboard notes recommend nursing Sharad Brooks back to bed. Assessment / Impression:    Patient's tolerance of treatment: Well  Adverse Reaction: None  Significant change in status and impact: Improved from previous session  Barriers to improvement: None      Plan for Next Session:    Continue per OT POC per patient's tolerance c plan to emphasis transfer training for ADL tasks. Would continue to benefit from co-Tx.     Time in:  1318  Time out:  1346  Timed treatment minutes:  28  Total treatment time:  28      Electronically signed by:    SAMANTHA Sheikh  10/31/2022, 3:02 PM       Goals:  Time frame for goals: 2 weeks  Pt will complete grooming tasks with CGA at standing level with chair nearby for breaks PRN   Pt will complete toileting tasks with MaxA using BSC   Pt will complete UB dressing tasks with [place] seated EOB   Pt will complete LB dressing tasks with MaxA seated EOB and STS for hike   Pt will complete UB bathing tasks with [place] seated and AE PRN   Pt will complete LB bathing tasks with [place] seated and AE PRN   Pt will complete therapeutic exercise/activity to increase independence in ADL/IADL function  Pt will practice functional transfers and mobility with AD for increased safety and independence

## 2022-10-31 NOTE — PLAN OF CARE
Problem: Discharge Planning  Goal: Discharge to home or other facility with appropriate resources  Outcome: Progressing     Problem: Safety - Adult  Goal: Free from fall injury  Outcome: Progressing     Problem: Skin/Tissue Integrity  Goal: Absence of new skin breakdown  Description: 1. Monitor for areas of redness and/or skin breakdown  2. Assess vascular access sites hourly  3. Every 4-6 hours minimum:  Change oxygen saturation probe site  4. Every 4-6 hours:  If on nasal continuous positive airway pressure, respiratory therapy assess nares and determine need for appliance change or resting period. Outcome: Progressing     Problem: ABCDS Injury Assessment  Goal: Absence of physical injury  Outcome: Progressing     Problem: Confusion  Goal: Confusion, delirium, dementia, or psychosis is improved or at baseline  Description: INTERVENTIONS:  1. Assess for possible contributors to thought disturbance, including medications, impaired vision or hearing, underlying metabolic abnormalities, dehydration, psychiatric diagnoses, and notify attending LIP  2. [place] high risk fall precautions, as indicated  3. Provide frequent short contacts to provide reality reorientation, refocusing and direction  4. Decrease environmental stimuli, including noise as appropriate  5. Monitor and intervene to maintain adequate nutrition, hydration, elimination, sleep and activity  6. If unable to ensure safety without constant attention obtain sitter and review sitter guidelines with assigned personnel  7.  Initiate Psychosocial CNS and Spiritual Care consult, as indicated  Outcome: Progressing     Problem: Chronic Conditions and Co-morbidities  Goal: Patient's chronic conditions and co-morbidity symptoms are monitored and maintained or improved  Outcome: Progressing

## 2022-10-31 NOTE — PROGRESS NOTES
V2.0  [facility] [place]      Name:  Ming Michaels /Age/Sex: 1940  (78 y.o. male)   MRN & CSN:  [id number] & [id number] Encounter Date/Time: 10/31/2022 2:03 PM EDT    Location:  5237/0490-K PCP: Lexy Tavarez MD       Hospital Day: 8    Assessment and Plan:   Ming Michaels is a 78 y.o. male  who presents with Hypoglycemia      Plan:  Acute severe and refractory hypoglycemia, can be secondary to decreased p.o. intake and with Lantus use. No more episodes of hypoglycemia in last 24 hours. Acute metabolic encephalopathy S/T severe and refractory hypoglycemia, Resolved. CT chest, abdomen, pelvis ordered showed probable pyelonephritis of L kidney, was initially started on meropenem but urine cultures were negative so was discontinued. Cystoscopy/left ureteral stent exchange as outpatient    3. Enterococcus raffinosus bacteremia: 1 out of 4 bottles were positive for it, will start patient on ampicillin and consult ID. Recommends repeat blood cultures until negative. Initial repeat blood cultures were ordered on , was not collected, reordered blood cultures again and discussed with RN regarding necessity of the blood culture. 4. Elevated troponin  trop T 0.044, chronically elevated in setting of CKD, EKG with sinus bradycardia, no acute ST T wave changes    5. Chronic anemia - Hgb 8.0, near baseline   6. CKDIII Cr 2.0, near baseline avoid nephrotoxins     Diet ADULT DIET;  Regular; 5 carb choices (75 gm/meal)   DVT Prophylaxis [x] Lovenox, []  Heparin, [] SCDs, [] Ambulation,  [] Eliquis, [] Xarelto  [] Coumadin   Code Status Full Code   Disposition From: SNF  Expected Disposition: SNF  Estimated Date of Discharge: 3 days  Patient requires continued admission due to    Surrogate Decision Maker/ POA      Subjective:     Chief Complaint: Hypoglycemia (39 per ems upon arrival. Glucagon given at nursing home and D10 given per medics)       Ming Michaels is a 78 y.o. male who presents with hypoglycemia. Alert and awake this morning, follows commands, feels better. Complains of occasional leg discomfort. Denies any headache, nausea, vomiting, chest pain, shortness of breath, abdominal pain, palpitations, urinary or bowel changes. Review of Systems:    Review of Systems  10 point ROS negative except as noted above. Objective: Intake/Output Summary (Last 24 hours) at 10/31/2022 1008  Last data filed at 10/31/2022 0414  Gross per 24 hour   Intake 480 ml   Output 1875 ml   Net -1395 ml          Vitals:   Vitals:    10/31/22 0859   BP: (!) 148/65   Pulse: 77   Resp: 13   Temp: 98.1 °F (36.7 °C)   SpO2: 98%       Physical Exam:   Physical Exam   GEN    patient is awake appears stated age, not in respiratory distress  EYES   Pupils are equally round. No scleral erythema, discharge, or conjunctivitis. HENT  Mucous membranes are moist.  NECK  Supple, no apparent thyromegaly or masses. RESP  Clear to auscultation, no wheezes, rales or rhonchi. Respirations even and unlabored on RA. CARDIO/VASC   S1/S2 auscultated. Regular rate without appreciable murmurs, rubs, or gallops. Peripheral pulses equal bilaterally and palpable. No peripheral edema. GI        Abdomen is soft without significant tenderness, masses, or guarding. Suprapubic catheter noted, site looks clean and dry.        No costovertebral angle tenderness. MSK    No gross joint deformities. SKIN    Normal coloration, warm, dry.   NEURO: Awake and alert, follows commands alert and oriented to time and person PSYCH    mood appears normal  Medications:   Medications:    ampicillin IV  1,000 mg IntraVENous 6 times per day    insulin lispro  0-16 Units SubCUTAneous Q4H    insulin lispro  0-4 Units SubCUTAneous Nightly    pantoprazole  40 mg Oral QAM AC    sodium chloride flush  5-40 mL IntraVENous 2 times per day    enoxaparin  40 mg SubCUTAneous Daily    dextrose bolus  250 mL IntraVENous Once    carvedilol  6.25 mg Oral BID WC    ferrous sulfate  325 mg Oral Daily with breakfast    torsemide  20 mg Oral Once per day on Mon Wed Fri    levothyroxine  75 mcg Oral Daily    aspirin  81 mg Oral Nightly      Infusions:    sodium chloride 25 mL (10/31/22 0903)    dextrose       PRN Meds: dextrose bolus, 125 mL, PRN   Or  dextrose bolus, 250 mL, PRN  sodium chloride flush, 5-40 mL, PRN  sodium chloride, , PRN  ondansetron, 4 mg, Q8H PRN   Or  ondansetron, 4 mg, Q6H PRN  polyethylene glycol, 17 g, Daily PRN  acetaminophen, 650 mg, Q6H PRN   Or  acetaminophen, 650 mg, Q6H PRN  glucose, 4 tablet, PRN  dextrose bolus, 125 mL, PRN   Or  dextrose bolus, 250 mL, PRN  glucagon (rDNA), 1 mg, PRN  dextrose, , Continuous PRN      Labs      Recent Results (from the past 24 hour(s))   POCT Glucose    Collection Time: 10/30/22 12:49 PM   Result Value Ref Range    POC Glucose 179 (H) 70 - 99 MG/DL   POCT Glucose    Collection Time: 10/30/22  3:37 PM   Result Value Ref Range    POC Glucose 198 (H) 70 - 99 MG/DL   POCT Glucose    Collection Time: 10/30/22  8:01 PM   Result Value Ref Range    POC Glucose 222 (H) 70 - 99 MG/DL   POCT Glucose    Collection Time: 10/30/22 11:16 PM   Result Value Ref Range    POC Glucose 148 (H) 70 - 99 MG/DL   C-Reactive Protein    Collection Time: 10/31/22  1:51 AM   Result Value Ref Range    CRP, High Sensitivity 4.5 0.0 - 5.0 mg/L   POCT Glucose    Collection Time: 10/31/22  3:40 AM   Result Value Ref Range    POC Glucose 118 (H) 70 - 99 MG/DL   POCT Glucose    Collection Time: 10/31/22  9:07 AM   Result Value Ref Range    POC Glucose 130 (H) 70 - 99 MG/DL        Imaging/Diagnostics Last 24 Hours   CT HEAD WO CONTRAST    Result Date: 10/24/2022  EXAMINATION: CT OF THE HEAD WITHOUT CONTRAST  10/24/2022 12:41 pm TECHNIQUE: CT of the head was performed without the administration of intravenous contrast. Automated exposure control, iterative reconstruction, and/or weight based adjustment of the mA/kV was utilized to reduce the radiation dose to as low as reasonably achievable. COMPARISON: 04/18/2022 HISTORY: ORDERING SYSTEM PROVIDED HISTORY: HEAD TRAUMA, CLOSED, MILD, GCS >= 13, NO RISK FACTORS, NEURO EXAM NORMAL TECHNOLOGIST PROVIDED HISTORY: Has a \"code stroke\" or \"stroke alert\" been called? ->No Reason for exam:->slurred speech Decision Support Exception - unselect if not a suspected or confirmed emergency medical condition->Emergency Medical Condition (MA) Reason for Exam: slurred speech, Head trauma, closed, mild, GCS >= 13, no risk factors, neuro exam normal FINDINGS: BRAIN/VENTRICLES: There is no acute intracranial hemorrhage, mass effect or midline shift. No abnormal extra-axial fluid collection. The gray-white differentiation is maintained without evidence of an acute infarct. There is no evidence of hydrocephalus. Mild chronic microvascular ischemic change. ORBITS: The visualized portion of the orbits demonstrate no acute abnormality. SINUSES: The visualized paranasal sinuses and mastoid air cells demonstrate no acute abnormality. SOFT TISSUES/SKULL:  No acute abnormality of the visualized skull or soft tissues. No acute intracranial abnormality. XR CHEST PORTABLE    Result Date: 10/24/2022  EXAMINATION: ONE XRAY VIEW OF THE CHEST 10/24/2022 11:41 am COMPARISON: 09/10/2022. HISTORY: ORDERING SYSTEM PROVIDED HISTORY: stroke symptoms TECHNOLOGIST PROVIDED HISTORY: Reason for exam:->stroke symptoms Reason for Exam: STROKE SYMPTOMS FINDINGS: The heart size is within normal limits. The pulmonary vasculature is also within normal limits. No acute infiltrates are seen. The costophrenic angles are sharp bilaterally. No pneumothoraces are noted. There has been removal of the right central venous line. 1. No active pulmonary disease.      CT CHEST ABDOMEN PELVIS W CONTRAST Additional Contrast? None    Result Date: 10/24/2022  EXAMINATION: CT OF THE CHEST, ABDOMEN, AND PELVIS WITH CONTRAST 10/24/2022 5:51 pm TECHNIQUE: CT of the chest, abdomen and pelvis was performed with the administration of intravenous contrast. Multiplanar reformatted images are provided for review. Automated exposure control, iterative reconstruction, and/or weight based adjustment of the mA/kV was utilized to reduce the radiation dose to as low as reasonably achievable. COMPARISON: CT abdomen and pelvis 09/10/2022 HISTORY: ORDERING SYSTEM PROVIDED HISTORY: etiology of sepsis TECHNOLOGIST PROVIDED HISTORY: Reason for exam:->etiology of sepsis Additional Contrast?->None Reason for Exam: etiology of sepsis Additional signs and symptoms: no Relevant Medical/Surgical History: 80 ml isovue 370 used FINDINGS: Chest: Mediastinum: No enlarged lymphadenopathy. Small lymph nodes are scattered. Small fixed hiatus hernia. Minimal wall thickening of the esophagus. Heart: Heart size is normal. No pericardial effusion. Great vessels: the great vessels are patent and unremarkable. Aorta: Aorta is patent and unremarkable. Negative for aneurysm or dissection. Lungs/pleura: Minimal opacities in the lower lobes. Trachea and bronchi are patent. Minimal paraseptal emphysema. No consolidation. Soft Tissues: No enlarged axillary adenopathy. No subcutaneous mass. Thyroid is unremarkable. Appearance of the line in the left arm. However, the tip is in the distal subclavian vein. Osseous: No suspicious lytic or sclerotic lesions. Negative for pneumonia. Mild bibasilar opacities which are most likely atelectasis. Small fixed hiatus hernia. Abdomen/Pelvis: Liver: Liver is normal density. No enhancing masses. Normal enhancement of the intrahepatic vasculature. Spleen:  Normal size. No enhancing masses calcified granulomata in the spleen. Pancreas: No enhancing masses. No ductal dilation. No adjacent fatty stranding. Gallbladder no calcified stones or sludge. No pericholecystic fluid. No wall thickening. Bile ducts: No biliary ductal dilation Adrenals:  The adrenal glands are unremarkable Kidneys: Urinary tract stent on the left. The left kidney is mildly atrophic. Perinephric stranding on the left and mild decreased enhancement of the cortex when compared to the right. However, no hydronephrosis. No large mass. Heterogeneity of the contrast enhancement on the left. Left urinary tract stent extends into the bladder. No stones along the pathway of the left ureter. GI: No small bowel dilation. No colonic wall thickening. No large mass. The stomach is unremarkable in appearance but it is underdistended. Moderate amount of stool throughout the colon. Mesentery: No enlarged lymphadenopathy. No free fluid. No free gas. Aorta: Aorta is of normal size. Negative for dissection. IVC is unremarkable. Celiac axis and SMA are patent. Portal vein is patent. Atherosclerotic change of the aorta in the renal arteries. PELVIS GI: No small bowel dilation. No colonic wall thickening. No enlarged lymphadenopathy. Small amount of free fluid in the pelvis. : The bladder is unremarkable in appearance. No large mass. Suprapubic catheter. The bladder is completely decompressed. Prostate appears to have been resected. Phleboliths in the pelvis. Multiple clips in the pelvis. No enlarged lymphadenopathy. Osseous: Bone island in the right ilium. No lytic destructive lesions. Bone island in the right acetabulum also unchanged. IMPRESSION: 1. Left urinary tract stent is in place. Probable pyelonephritis of the left kidney.        Electronically signed by Abbie Palafox MD on 10/31/2022 at 10:08 AM

## 2022-10-31 NOTE — PROGRESS NOTES
Physical Therapy    Physical Therapy Treatment Note  Name: Jagdeep Aviles MRN: [id number] :   1940   Date:  10/31/2022   Admission Date: 10/24/2022 Room:  [address] [place], [place] [address]   Restrictions/Precautions:          Fall risk, mild confusion, suprapubic catheter with temp. Monitor and pt has external catheters , tele, IV, fall risk, Contact  Communication with other providers:  co-tx with Ely Reynolds. Subjective:  Patient states:  \"I'll be ready to get Ivis here\"  Pain:   Location, Type, Intensity (0/10 to 10/10):  pt without c/o  Objective:    Observation:  pt is awake in semi-fowlers with son present, daughter enters. Objective Measures:  Pt vitals remain stable throughout  Treatment, including education/measures:  Bed mobility: with HOB raised, PT encouraged sequential LE advancement to EOB, pt with good effort, increased time Min A for LE to EOB. Pt with increased difficulty managing hips/trunk to EOB d/t weakness, required Mod A x2 to progress to EOB. Sitting balance: At EOB pt demonstrates initial increased retro lean, Min A to correct with increased cues. Scooting/ pelvis alignment with increased time and pt effort Mod A. Improved to SBA with time and postural adjustments, pt intermittent increased retro lean suggestive of perceptual deficits. PT involved pt in dynamic seated reaching tasks- noted pt not engaging forward trunk lean or hip flexion, required increased cues/demonstration : Anterior to target outside TYSHAWN x2, L/R lateral outside TYSHAWN x2. Forward rocking x5 reps for spinal stretch and WS practice CGA. PT screened LE MMT for safety in transfer determination, . Recliner chair prepped with Annella Duty pad/blanket/chucks pad for skin health. Recliner placed at pt L side at EOB. Sit>stand from EOB to RW x1 failed attempt d/t insufficient anterior WS/LE power. With emphasis on forward flexion, trial #2 successful with Min A x2. Static stance x1 min CGA, ensured pt able to WS in stance. Stand-step transfer from EOB > chair with RW, Min A x2 for balance, RW management. Pt with x4 steps to chair. Min A to control return to seated. Min increased time required to position pt in chair comfortably.    Patient left safely in the chair with leg rest elevated, matheus under patient, with call light/phone in reach with alarm applied  Assessment / Impression:    Patient's tolerance of treatment:  fair   Adverse Reaction: none  Significant change in status and impact:  improved transfers  Barriers to improvement:  none  Plan for Next Session:    Continue POC  Time in:  13:18  Time out:  13:46  Timed treatment minutes:  28  Total treatment time:  28    Previously filed items:           Short Term Goals  Time Frame for Short Term Goals: 1 week  Short Term Goal 1: Pt will perform sup>sit with Min A and cues  Short Term Goal 2: Pt will perform standing balance tasks x5' with UE support and Min A  Short Term Goal 3: Pt will AMB x15 ft with RW, chair follow, Mod A    Electronically signed by:    Priscilla Ng PT  10/31/2022, 5:21 PM

## 2022-10-31 NOTE — PROGRESS NOTES
[facility]   Progress Note  [facility] 0 1 2  Date: 10/31/2022   Patient: Aftab Grullon   : 1940   DOA: 10/24/2022   MRN: [id number]   ROOM#: [id number]     Admit Date: 10/24/2022     Collaborating Urologist on Call at time of admission: Dr. Noa Mcdonald    CC: AMS    Subjective:     Pain: none, no nausea and no vomiting,   Bowel Movement/Flatus:   Yes  Voiding:  suprapubic catheter with clear yellow urine    Patient doing well. Mental status significantly improved today    Objective:      Vitals:    BP (!) 170/80   Pulse 75   Temp 98.2 °F (36.8 °C) (Oral)   Resp 13   Ht 5' 8\" (1.727 m)   Wt 197 lb 1.5 oz (89.4 kg)   SpO2 98%   BMI 29.97 kg/m²    Temp  Av °F (36.7 °C)  Min: 97.5 °F (36.4 °C)  Max: 98.5 °F (36.9 °C)     Intake/Output Summary (Last 24 hours) at 10/31/2022 0848  Last data filed at 10/31/2022 0414  Gross per 24 hour   Intake 720 ml   Output 1875 ml   Net -1155 ml       Physical Exam:   General appearance: alert, appears stated age, cooperative, and no distress  Head: Normocephalic, without obvious abnormality, atraumatic  Back:  Mild left CVA tenderness  Abdomen: Soft, nontender, nondistended. 16 French SPT with clear yellow urine.     Labs:   WBC:    Lab Results   Component Value Date/Time    WBC 8.3 10/30/2022 05:05 AM      Hemoglobin/Hematocrit:    Lab Results   Component Value Date/Time    HGB 8.1 10/30/2022 05:05 AM    HCT 25.7 10/30/2022 05:05 AM      BMP:   Lab Results   Component Value Date/Time     10/30/2022 05:05 AM    K 4.4 10/30/2022 05:05 AM    K 3.9 2018 04:42 AM     10/30/2022 05:05 AM    CO2 22 10/30/2022 05:05 AM    BUN 42 10/30/2022 05:05 AM    LABALBU 3.7 10/30/2022 05:05 AM    CREATININE 1.8 10/30/2022 05:05 AM    CALCIUM 9.2 10/30/2022 05:05 AM    GFRAA 31 10/13/2022 06:30 AM    LABGLOM 37 10/30/2022 05:05 AM      PT/INR:    Lab Results   Component Value Date/Time    PROTIME 14.0 10/24/2022 12:20 PM    PROTIME 15.2 01/24/2011 06:48 PM    INR 1.08 10/24/2022 12:20 PM      PTT:    Lab Results   Component Value Date/Time    APTT 37.6 09/12/2022 01:24 PM      Blood Culture:  Enterococcus raffinosus   Urine Culture:  No growth    Enterococcus raffinosus (2)  Antibiotic Interpretation Microscan Method Status    ampicillin Sensitive 8 BACTERIAL SUSCEPTIBILITY PANEL USMAN Final    vancomycin Sensitive 1 BACTERIAL SUSCEPTIBILITY PANEL USMAN Final    gentamicin-syn Sensitive <=500 BACTERIAL SUSCEPTIBILITY PANEL USMAN Final    streptomycin-syn Resistant >1000 BACTERIAL SUSCEPTIBILITY PANEL USMAN Final      Imaging:   CT HEAD WO CONTRAST    Result Date: 10/24/2022  EXAMINATION: CT OF THE HEAD WITHOUT CONTRAST  10/24/2022 12:41 pm TECHNIQUE: CT of the head was performed without the administration of intravenous contrast. Automated exposure control, iterative reconstruction, and/or weight based adjustment of the mA/kV was utilized to reduce the radiation dose to as low as reasonably achievable. COMPARISON: 04/18/2022 HISTORY: ORDERING SYSTEM PROVIDED HISTORY: HEAD TRAUMA, CLOSED, MILD, GCS >= 13, NO RISK FACTORS, NEURO EXAM NORMAL TECHNOLOGIST PROVIDED HISTORY: Has a \"code stroke\" or \"stroke alert\" been called? ->No Reason for exam:->slurred speech Decision Support Exception - unselect if not a suspected or confirmed emergency medical condition->Emergency Medical Condition (MA) Reason for Exam: slurred speech, Head trauma, closed, mild, GCS >= 13, no risk factors, neuro exam normal FINDINGS: BRAIN/VENTRICLES: There is no acute intracranial hemorrhage, mass effect or midline shift. No abnormal extra-axial fluid collection. The gray-white differentiation is maintained without evidence of an acute infarct. There is no evidence of hydrocephalus. Mild chronic microvascular ischemic change. ORBITS: The visualized portion of the orbits demonstrate no acute abnormality.  SINUSES: The visualized paranasal sinuses and mastoid air cells demonstrate no acute abnormality. SOFT TISSUES/SKULL:  No acute abnormality of the visualized skull or soft tissues. No acute intracranial abnormality. XR CHEST PORTABLE    Result Date: 10/24/2022  EXAMINATION: ONE XRAY VIEW OF THE CHEST 10/24/2022 11:41 am COMPARISON: 09/10/2022. HISTORY: ORDERING SYSTEM PROVIDED HISTORY: stroke symptoms TECHNOLOGIST PROVIDED HISTORY: Reason for exam:->stroke symptoms Reason for Exam: STROKE SYMPTOMS FINDINGS: The heart size is within normal limits. The pulmonary vasculature is also within normal limits. No acute infiltrates are seen. The costophrenic angles are sharp bilaterally. No pneumothoraces are noted. There has been removal of the right central venous line. 1. No active pulmonary disease. CT CHEST ABDOMEN PELVIS W CONTRAST Additional Contrast? None    Result Date: 10/24/2022  EXAMINATION: CT OF THE CHEST, ABDOMEN, AND PELVIS WITH CONTRAST 10/24/2022 5:51 pm TECHNIQUE: CT of the chest, abdomen and pelvis was performed with the administration of intravenous contrast. Multiplanar reformatted images are provided for review. Automated exposure control, iterative reconstruction, and/or weight based adjustment of the mA/kV was utilized to reduce the radiation dose to as low as reasonably achievable. COMPARISON: CT abdomen and pelvis 09/10/2022 HISTORY: ORDERING SYSTEM PROVIDED HISTORY: etiology of sepsis TECHNOLOGIST PROVIDED HISTORY: Reason for exam:->etiology of sepsis Additional Contrast?->None Reason for Exam: etiology of sepsis Additional signs and symptoms: no Relevant Medical/Surgical History: 80 ml isovue 370 used FINDINGS: Chest: Mediastinum: No enlarged lymphadenopathy. Small lymph nodes are scattered. Small fixed hiatus hernia. Minimal wall thickening of the esophagus. Heart: Heart size is normal. No pericardial effusion. Great vessels: the great vessels are patent and unremarkable. Aorta: Aorta is patent and unremarkable. Negative for aneurysm or dissection.  Lungs/pleura: Minimal opacities in the lower lobes. Trachea and bronchi are patent. Minimal paraseptal emphysema. No consolidation. Soft Tissues: No enlarged axillary adenopathy. No subcutaneous mass. Thyroid is unremarkable. Appearance of the line in the left arm. However, the tip is in the distal subclavian vein. Osseous: No suspicious lytic or sclerotic lesions. Negative for pneumonia. Mild bibasilar opacities which are most likely atelectasis. Small fixed hiatus hernia. Abdomen/Pelvis: Liver: Liver is normal density. No enhancing masses. Normal enhancement of the intrahepatic vasculature. Spleen:  Normal size. No enhancing masses calcified granulomata in the spleen. Pancreas: No enhancing masses. No ductal dilation. No adjacent fatty stranding. Gallbladder no calcified stones or sludge. No pericholecystic fluid. No wall thickening. Bile ducts: No biliary ductal dilation Adrenals: The adrenal glands are unremarkable Kidneys: Urinary tract stent on the left. The left kidney is mildly atrophic. Perinephric stranding on the left and mild decreased enhancement of the cortex when compared to the right. However, no hydronephrosis. No large mass. Heterogeneity of the contrast enhancement on the left. Left urinary tract stent extends into the bladder. No stones along the pathway of the left ureter. GI: No small bowel dilation. No colonic wall thickening. No large mass. The stomach is unremarkable in appearance but it is underdistended. Moderate amount of stool throughout the colon. Mesentery: No enlarged lymphadenopathy. No free fluid. No free gas. Aorta: Aorta is of normal size. Negative for dissection. IVC is unremarkable. Celiac axis and SMA are patent. Portal vein is patent. Atherosclerotic change of the aorta in the renal arteries. PELVIS GI: No small bowel dilation. No colonic wall thickening. No enlarged lymphadenopathy. Small amount of free fluid in the pelvis.  : The bladder is unremarkable in appearance. No large mass. Suprapubic catheter. The bladder is completely decompressed. Prostate appears to have been resected. Phleboliths in the pelvis. Multiple clips in the pelvis. No enlarged lymphadenopathy. Osseous: Bone island in the right ilium. No lytic destructive lesions. Bone island in the right acetabulum also unchanged. IMPRESSION: 1. Left urinary tract stent is in place. Probable pyelonephritis of the left kidney. Assessment & Plan:      Ming Michaels is a 78 y.o. male admitted 10/24/2022 for AMS, hypoglycemia, UTI     1) Urinary tract infection: Prior blood culture 8/30 ESBL E. coli, repeat blood culture 9/9 Candida. Was on Fetroja and fluconazole per ID. Urine culture 10/9 ESBL E. coli, on ertapenem as outpatient completed 10/23. UA showing moderate leuks and large blood but negative nitrites and no bacteria seen on microscopy. Urine culture no growth. Blood cultures positive Enterococcus raffinosus. ID consulted, plan reviewed - on ampicillin pending repeat blood culture negative at 48hrs  2) Chronic urine retention: Managed by suprapubic catheter. 12 Romansh SPT exchange at the bedside 10/25  3) Chronic left hydronephrosis: Managed by ureteral stent exchange every 3 months. Recent exchange earlier this month was rescheduled due to ongoing anemia. Plan cystoscopy/left stent exchange in the next few weeks after appropriate abx  4) CRI: stable    Will follow peripherally. Patient seen and examined, chart reviewed.      Electronically signed by PAWEL Fernandes on 10/31/2022 at 8:48 AM

## 2022-11-01 LAB
ESTIMATED AVERAGE GLUCOSE: 146 MG/DL
GLUCOSE BLD-MCNC: 110 MG/DL (ref 70–99)
GLUCOSE BLD-MCNC: 131 MG/DL (ref 70–99)
GLUCOSE BLD-MCNC: 157 MG/DL (ref 70–99)
GLUCOSE BLD-MCNC: 172 MG/DL (ref 70–99)
GLUCOSE BLD-MCNC: 305 MG/DL (ref 70–99)
GLUCOSE BLD-MCNC: 99 MG/DL (ref 70–99)
HBA1C MFR BLD: 6.7 % (ref 4.2–6.3)
PROCALCITONIN: 0.49

## 2022-11-01 PROCEDURE — 85025 COMPLETE CBC W/AUTO DIFF WBC: CPT

## 2022-11-01 PROCEDURE — 84145 PROCALCITONIN (PCT): CPT

## 2022-11-01 PROCEDURE — 87040 BLOOD CULTURE FOR BACTERIA: CPT

## 2022-11-01 PROCEDURE — 97530 THERAPEUTIC ACTIVITIES: CPT

## 2022-11-01 PROCEDURE — 80048 BASIC METABOLIC PNL TOTAL CA: CPT

## 2022-11-01 PROCEDURE — 1200000000 HC SEMI PRIVATE

## 2022-11-01 PROCEDURE — 82962 GLUCOSE BLOOD TEST: CPT

## 2022-11-01 PROCEDURE — 97110 THERAPEUTIC EXERCISES: CPT

## 2022-11-01 PROCEDURE — 6370000000 HC RX 637 (ALT 250 FOR IP): Performed by: PHYSICIAN ASSISTANT

## 2022-11-01 PROCEDURE — 6370000000 HC RX 637 (ALT 250 FOR IP): Performed by: NURSE PRACTITIONER

## 2022-11-01 PROCEDURE — 83036 HEMOGLOBIN GLYCOSYLATED A1C: CPT

## 2022-11-01 PROCEDURE — 6360000002 HC RX W HCPCS: Performed by: NURSE PRACTITIONER

## 2022-11-01 PROCEDURE — 2580000003 HC RX 258: Performed by: NURSE PRACTITIONER

## 2022-11-01 PROCEDURE — 99232 SBSQ HOSP IP/OBS MODERATE 35: CPT | Performed by: NURSE PRACTITIONER

## 2022-11-01 PROCEDURE — 94761 N-INVAS EAR/PLS OXIMETRY MLT: CPT

## 2022-11-01 PROCEDURE — 2500000003 HC RX 250 WO HCPCS

## 2022-11-01 PROCEDURE — 2580000003 HC RX 258: Performed by: PHYSICIAN ASSISTANT

## 2022-11-01 PROCEDURE — 6360000002 HC RX W HCPCS: Performed by: PHYSICIAN ASSISTANT

## 2022-11-01 PROCEDURE — 36415 COLL VENOUS BLD VENIPUNCTURE: CPT

## 2022-11-01 RX ORDER — OLANZAPINE 10 MG/1
5 INJECTION, POWDER, LYOPHILIZED, FOR SOLUTION INTRAMUSCULAR
Status: COMPLETED | OUTPATIENT
Start: 2022-11-01 | End: 2022-11-01

## 2022-11-01 RX ADMIN — CARVEDILOL 6.25 MG: 6.25 TABLET, FILM COATED ORAL at 16:08

## 2022-11-01 RX ADMIN — ENOXAPARIN SODIUM 40 MG: 40 INJECTION SUBCUTANEOUS at 10:10

## 2022-11-01 RX ADMIN — OLANZAPINE 5 MG: 10 INJECTION, POWDER, FOR SOLUTION INTRAMUSCULAR at 21:22

## 2022-11-01 RX ADMIN — AMPICILLIN SODIUM 1000 MG: 1 INJECTION, POWDER, FOR SOLUTION INTRAMUSCULAR; INTRAVENOUS at 12:33

## 2022-11-01 RX ADMIN — AMPICILLIN SODIUM 1000 MG: 1 INJECTION, POWDER, FOR SOLUTION INTRAMUSCULAR; INTRAVENOUS at 05:25

## 2022-11-01 RX ADMIN — CARVEDILOL 6.25 MG: 6.25 TABLET, FILM COATED ORAL at 10:10

## 2022-11-01 RX ADMIN — AMPICILLIN SODIUM 1000 MG: 1 INJECTION, POWDER, FOR SOLUTION INTRAMUSCULAR; INTRAVENOUS at 08:00

## 2022-11-01 RX ADMIN — SODIUM CHLORIDE, PRESERVATIVE FREE 10 ML: 5 INJECTION INTRAVENOUS at 10:29

## 2022-11-01 RX ADMIN — INSULIN LISPRO 12 UNITS: 100 INJECTION, SOLUTION INTRAVENOUS; SUBCUTANEOUS at 17:27

## 2022-11-01 RX ADMIN — SODIUM CHLORIDE, PRESERVATIVE FREE 5 ML: 5 INJECTION INTRAVENOUS at 20:50

## 2022-11-01 RX ADMIN — PANTOPRAZOLE SODIUM 40 MG: 40 TABLET, DELAYED RELEASE ORAL at 05:26

## 2022-11-01 RX ADMIN — LEVOTHYROXINE SODIUM 75 MCG: 0.07 TABLET ORAL at 05:26

## 2022-11-01 RX ADMIN — AMPICILLIN SODIUM 1000 MG: 1 INJECTION, POWDER, FOR SOLUTION INTRAMUSCULAR; INTRAVENOUS at 20:46

## 2022-11-01 RX ADMIN — AMPICILLIN SODIUM 1000 MG: 1 INJECTION, POWDER, FOR SOLUTION INTRAMUSCULAR; INTRAVENOUS at 03:12

## 2022-11-01 RX ADMIN — AMPICILLIN SODIUM 1000 MG: 1 INJECTION, POWDER, FOR SOLUTION INTRAMUSCULAR; INTRAVENOUS at 16:12

## 2022-11-01 RX ADMIN — FERROUS SULFATE TAB 325 MG (65 MG ELEMENTAL FE) 325 MG: 325 (65 FE) TAB at 10:09

## 2022-11-01 NOTE — PROGRESS NOTES
Infectious Disease Progress Note  2022   Patient Name: Darlene Torres : 1940   Impression  Enterococcus raffinosus in  Blood Cultures, Pathogen vs Contaminants:  Left Pyelonephritis Secondary to Chronic Left Hydronephrosis, Chronic Urinary Retention:  Recent Fungemia 22:  Recent Recurrent ESBL E.coli:  Afebrile, no leukocytosis  CRP remains low  Pct on DWT  10/24-Urine culture: NGTD  10/24-BC  Enterococcus raffinosus   10/24-CT chest, A&P W IV Contrast: Negative for pneumonia. Mild bibasilar opacities which are most likely   atelectasis. Small fixed hiatus hernia. 1. Left urinary tract stent is in place. Probable pyelonephritis of the left   Kidney  Dr. Verónica Armenta onboard for urology, rrec cystoscopy/left stent exchange in the next few weeks after appropriate ABX. Mgt with ureteral stent exchange every 3 months. DMII:  Delirium:  CKD3:  Tobacco Abuse:  H/o Prostate Cancer S/p Prostatectomy 1996:  HTN:  Dementia:  Multi-morbidity: per PMHx: s/p cervical spine fusion, recurrent UTIs with [facility]  Plan:  Continue IV ampicillin 1 gm q4h  Trend CRP and Pct, ordered  Repeat BC until negative at 48 hours  Ordered repeat [facility] 10/28, they were not obtained  Await repeat BC re-ordered on 10/31, if negative, will DC ampicillin  BC were still not obtained 10/31, repeated   Ordered CBC with dif and BMP today    Ongoing Antimicrobial Therapy  Ampicillin 10/28-? Completed Antimicrobial Therapy  Meropenem 10/24-?? History:? Interval history noted. Chief complaint: E.raffinosus in blood cultures. Possible left pyelonephritis. Denies n/v/d/f or untoward effects of antibiotics. States is feeling good today, denies any pain or dysuria. Physical Exam:  Vital Signs: /60   Pulse 86   Temp 98.2 °F (36.8 °C) (Oral)   Resp 14   Ht 5' 8\" (1.727 m)   Wt 200 lb 9.9 oz (91 kg)   SpO2 97%   BMI 30.50 kg/m²     Gen: A&O x 4, eating lunch. Chest: no distress and CTA. Good air movement.   Heart: RRR and no MRG. Abd: soft, non-distended, left CVA tenderness resolved. no hepatomegaly. Normoactive bowel sounds. Ext: no clubbing, cyanosis, or edema  Supra-Pubic Catheter Site: without erythema or tenderness draining clear yellow urine  Neuro: Mental status intact. CN 2-12 intact and no focal sensory or motor deficits     Radiologic / Imaging / TESTING  1. No active pulmonary disease. 10/24/22 CT Head WO Contrast:  Impression   No acute intracranial abnormality. 10/24/22 CT Chest Abdomen Pelvis W Contrast:  Impression       Negative for pneumonia. Mild bibasilar opacities which are most likely   atelectasis. Small fixed hiatus hernia. Abdomen/Pelvis:       Liver: Liver is normal density. No enhancing masses. Normal enhancement of   the intrahepatic vasculature. Spleen:  Normal size. No enhancing masses calcified granulomata in the   spleen. Pancreas: No enhancing masses. No ductal dilation. No adjacent fatty   stranding. Gallbladder no calcified stones or sludge. No pericholecystic fluid. No   wall thickening. Bile ducts: No biliary ductal dilation       Adrenals: The adrenal glands are unremarkable       Kidneys: Urinary tract stent on the left. The left kidney is mildly   atrophic. Perinephric stranding on the left and mild decreased enhancement   of the cortex when compared to the right. However, no hydronephrosis. No   large mass. Heterogeneity of the contrast enhancement on the left. Left   urinary tract stent extends into the bladder. No stones along the pathway of   the left ureter. GI: No small bowel dilation. No colonic wall thickening. No large mass. The stomach is unremarkable in appearance but it is underdistended. Moderate   amount of stool throughout the colon. Mesentery: No enlarged lymphadenopathy. No free fluid. No free gas. Aorta: Aorta is of normal size. Negative for dissection. IVC is   unremarkable. Celiac axis and SMA are patent. Portal vein is patent. Atherosclerotic change of the aorta in the renal arteries. PELVIS       GI: No small bowel dilation. No colonic wall thickening. No enlarged   lymphadenopathy. Small amount of free fluid in the pelvis. : The bladder is unremarkable in appearance. No large mass. Suprapubic   catheter. The bladder is completely decompressed. Prostate appears to have   been resected. Phleboliths in the pelvis. Multiple clips in the pelvis. No   enlarged lymphadenopathy. Osseous: Bone island in the right ilium. No lytic destructive lesions. Bone   island in the right acetabulum also unchanged. IMPRESSION:   1. Left urinary tract stent is in place. Probable pyelonephritis of the left   kidney.         Labs:    Recent Results (from the past 24 hour(s))   POCT Glucose    Collection Time: 10/31/22  3:23 PM   Result Value Ref Range    POC Glucose 266 (H) 70 - 99 MG/DL   POCT Glucose    Collection Time: 10/31/22 10:41 PM   Result Value Ref Range    POC Glucose 82 70 - 99 MG/DL   POCT Glucose    Collection Time: 11/01/22 12:58 AM   Result Value Ref Range    POC Glucose 99 70 - 99 MG/DL   POCT Glucose    Collection Time: 11/01/22  8:00 AM   Result Value Ref Range    POC Glucose 110 (H) 70 - 99 MG/DL   Procalcitonin    Collection Time: 11/01/22 10:14 AM   Result Value Ref Range    Procalcitonin 0.492    Hemoglobin A1C    Collection Time: 11/01/22 10:14 AM   Result Value Ref Range    Hemoglobin A1C 6.7 (H) 4.2 - 6.3 %    eAG 146 mg/dL   POCT Glucose    Collection Time: 11/01/22 10:45 AM   Result Value Ref Range    POC Glucose 157 (H) 70 - 99 MG/DL   POCT Glucose    Collection Time: 11/01/22 11:16 AM   Result Value Ref Range    POC Glucose 172 (H) 70 - 99 MG/DL     CULTURE results: Invalid input(s): BLOOD CULTURE,  URINE CULTURE, SURGICAL CULTURE    Diagnosis:  Patient Active Problem List   Diagnosis    Gait disturbance    Generalized weakness    Diabetes mellitus ([address]    Osteoarthritis    Anemia of chronic disorder    Infected implanted bladder sphincter cuff    Escherichia coli urinary tract infection    Hypertension    Sepsis ([address]    Acute encephalopathy    Type 2 diabetes mellitus with hypoglycemia without coma (HCC)    UTI (urinary tract infection) due to urinary indwelling catheter (HCC)    Hyperkalemia    Acute kidney failure (HCC)    Leg weakness, bilateral    Type 2 diabetes mellitus with neurological manifestations, uncontrolled    Complicated UTI (urinary tract infection)    Essential hypertension    Severe muscle deconditioning    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 (HCC)    Frequent falls    Chronic kidney disease, stage 3a ([address]    Uncontrolled type 2 diabetes mellitus with peripheral neuropathy    Prostate cancer ([address]    Suprapubic catheter ([address]    Sepsis due to urinary tract infection ([address]    Coagulase negative Staphylococcus bacteremia    Chronic kidney disease, stage IV (severe) (HCC)    Acute metabolic encephalopathy    SVT (supraventricular tachycardia) (HCC)    Metabolic encephalopathy    History of prostate cancer    Cigarette nicotine dependence without complication    Altered mental status    Serratia marcescens infection    Hypoglycemia    Hospital discharge follow-up    Fungemia    Pyelonephritis    Bacteremia due to Enterococcus       Active Problems  Principal Problem:    Hypoglycemia  Active Problems:    Pyelonephritis    Bacteremia due to Enterococcus  Resolved Problems:    * No resolved hospital problems. *    Electronically signed by: Electronically signed by Jonathon Gould.  VIVIANA Tate CNP on 11/1/2022 at 12:32 PM

## 2022-11-01 NOTE — PROGRESS NOTES
Hospitalist    Suprapubic catheter in place. Ate some breakfast.     Blood cx marked \"sent\" in Epic. Spoke with micro lab - they have not received them yet. Will request blood cultures again today. Will coordinate care with consultants. Per CM pt will go back to [facility] when ready for dc.

## 2022-11-01 NOTE — PROGRESS NOTES
Physical Therapy    Physical Therapy Treatment Note  Name: Jimmy Campa MRN: [id number] :   1940   Date:  2022   Admission Date: 10/24/2022 Room:  [address] [place] [address]   Restrictions/Precautions:          Fall risk, mild confusion, suprapubic catheter with temp. Monitor and pt has external catheters , tele, IV, fall risk, Contact  Communication with other providers:  per nurse ok to tx. Tech called to room due to pt incontinent of BM during tx session   Subjective:  Patient states:  pt agreeable and motivated to participate in tx  Pain:   Location, Type, Intensity (0/10 to 10/10):  no c/o pain during tx session  Objective:    Observation:  alert and oriented to self and was able to follow cues for mobility    Treatment, including education/measures:    Rolling left and right with bed rail and mod assist of 2  Sup<=>sit with HOB up using bed rails and max assist  Stedy placed in front of pt and he was able to transfer sit to stand x 5 reps with min assist.  Bed placed in chair position and pt was able to work on UE/LE ex:  10 reps shoulder flex  10 reps modified sit up using bilateral bed rails   10 reps aps  10 reps laqs  10 reps abd/add  Bed returned to sup and pt made comfortable. Safety  Patient left safely in the bed, with call light/phone in reach with alarm applied. Gait belt was used for transfers and gait. Assessment / Impression:      Patient's tolerance of treatment:  good   Adverse Reaction: na  Significant change in status and impact:  na  Barriers to improvement:  strength and safety  Plan for Next Session:    Cont.  POC  Time in:  1445  Time out:  1540  Timed treatment minutes:  55  Total treatment time:  54    Previously filed items:           Short Term Goals  Time Frame for Short Term Goals: 1 week  Short Term Goal 1: Pt will perform sup>sit with Min A and cues  Short Term Goal 2: Pt will perform standing balance tasks x5' with UE support and Min A  Short Term Goal 3: Pt will AMB x15 ft with RW, chair follow, Mod A    Electronically signed by:    Mame Delgadillo PTA  11/1/2022, 8:16 AM

## 2022-11-01 NOTE — PROGRESS NOTES
V2.0  [facility] Hospitalist Progress Note      Name:  Aftab Grullon /Age/Sex: 1940  (78 y.o. male)   MRN & CSN:  [id number] & [id number] Encounter Date/Time: 2022 8:07 AM EDT    Location:  3658/0744-Q PCP: Ever Lopez MD       Hospital Day: 9    Assessment and Plan:   Aftab Grullon is a 78 y.o. male with pmh of recent Candida UTI and Candida fungemia who presents with hypoglycemia     update-repeat blood cultures ordered. Await negative blood cultures. Plan:  Acute severe and refractory hypoglycemia, can be secondary to decreased p.o. intake and with Lantus use. No more episodes of hypoglycemia in last 24 hours. Acute metabolic encephalopathy S/T severe and refractory hypoglycemia, Resolved. CT chest, abdomen, pelvis ordered showed probable pyelonephritis of L kidney, was initially started on meropenem but urine cultures were negative so was discontinued. Cystoscopy/left ureteral stent exchange as outpatient     3. Enterococcus raffinosus bacteremia: 1 out of 4 bottles were positive for it, will start patient on ampicillin and consult ID. Recommends repeat blood cultures until negative. Initial repeat blood cultures were ordered on 1028, was not collected, reordered blood cultures      4. Elevated troponin  trop T 0.044, chronically elevated in setting of CKD, EKG with sinus bradycardia, no acute ST T wave changes     5. Chronic anemia - Hgb 8.0, near baseline   6. CKDIII Cr 2.0, near baseline avoid nephrotoxins       Diet ADULT DIET;  Regular; 5 carb choices (75 gm/meal)  ADULT ORAL NUTRITION SUPPLEMENT; Lunch, Dinner; Diabetic Oral Supplement   DVT Prophylaxis [x] Lovenox, []  Heparin, [] SCDs, [] Ambulation,  [] Eliquis, [] Xarelto  [] Coumadin   Code Status Full Code   Disposition From: SNF  Expected Disposition: SNF  Estimated Date of Discharge: in 1-2 days  Patient requires continued admission due to await blood cxChild,   Surrogate Decision Maker/ POALBA García his dextrose bolus  250 mL IntraVENous Once    carvedilol  6.25 mg Oral BID WC    ferrous sulfate  325 mg Oral Daily with breakfast    torsemide  20 mg Oral Once per day on Mon Wed Fri    levothyroxine  75 mcg Oral Daily    aspirin  81 mg Oral Nightly      Infusions:    sodium chloride 25 mL/hr at 10/31/22 2231    dextrose       PRN Meds: dextrose bolus, 125 mL, PRN   Or  dextrose bolus, 250 mL, PRN  sodium chloride flush, 5-40 mL, PRN  sodium chloride, , PRN  ondansetron, 4 mg, Q8H PRN   Or  ondansetron, 4 mg, Q6H PRN  polyethylene glycol, 17 g, Daily PRN  acetaminophen, 650 mg, Q6H PRN   Or  acetaminophen, 650 mg, Q6H PRN  glucose, 4 tablet, PRN  dextrose bolus, 125 mL, PRN   Or  dextrose bolus, 250 mL, PRN  glucagon (rDNA), 1 mg, PRN  dextrose, , Continuous PRN        Labs      Recent Results (from the past 24 hour(s))   POCT Glucose    Collection Time: 10/31/22  9:07 AM   Result Value Ref Range    POC Glucose 130 (H) 70 - 99 MG/DL   POCT Glucose    Collection Time: 10/31/22 11:53 AM   Result Value Ref Range    POC Glucose 154 (H) 70 - 99 MG/DL   POCT Glucose    Collection Time: 10/31/22  3:23 PM   Result Value Ref Range    POC Glucose 266 (H) 70 - 99 MG/DL   POCT Glucose    Collection Time: 10/31/22 10:41 PM   Result Value Ref Range    POC Glucose 82 70 - 99 MG/DL   POCT Glucose    Collection Time: 11/01/22 12:58 AM   Result Value Ref Range    POC Glucose 99 70 - 99 MG/DL   POCT Glucose    Collection Time: 11/01/22  8:00 AM   Result Value Ref Range    POC Glucose 110 (H) 70 - 99 MG/DL        Imaging/Diagnostics Last 24 Hours   No results found.     Electronically signed by Rosa Maria Fajardo MD on 11/1/2022 at 8:07 AM

## 2022-11-02 LAB
AMMONIA: 21 UMOL/L (ref 16–60)
ANION GAP SERPL CALCULATED.3IONS-SCNC: 13 MMOL/L (ref 4–16)
BUN BLDV-MCNC: 33 MG/DL (ref 6–23)
CALCIUM SERPL-MCNC: 9.1 MG/DL (ref 8.3–10.6)
CHLORIDE BLD-SCNC: 109 MMOL/L (ref 99–110)
CO2: 21 MMOL/L (ref 21–32)
CREAT SERPL-MCNC: 1.9 MG/DL (ref 0.9–1.3)
GFR SERPL CREATININE-BSD FRML MDRD: 34 ML/MIN/1.73M2
GLUCOSE BLD-MCNC: 135 MG/DL (ref 70–99)
GLUCOSE BLD-MCNC: 136 MG/DL (ref 70–99)
GLUCOSE BLD-MCNC: 142 MG/DL (ref 70–99)
GLUCOSE BLD-MCNC: 230 MG/DL (ref 70–99)
GLUCOSE BLD-MCNC: 254 MG/DL (ref 70–99)
GLUCOSE BLD-MCNC: 272 MG/DL (ref 70–99)
GLUCOSE BLD-MCNC: 59 MG/DL (ref 70–99)
GLUCOSE BLD-MCNC: 74 MG/DL (ref 70–99)
HCT VFR BLD CALC: 25.1 % (ref 42–52)
HEMOGLOBIN: 7.6 GM/DL (ref 13.5–18)
MCH RBC QN AUTO: 30 PG (ref 27–31)
MCHC RBC AUTO-ENTMCNC: 30.3 % (ref 32–36)
MCV RBC AUTO: 99.2 FL (ref 78–100)
PDW BLD-RTO: 16.5 % (ref 11.7–14.9)
PLATELET # BLD: 236 K/CU MM (ref 140–440)
PMV BLD AUTO: 11.6 FL (ref 7.5–11.1)
POTASSIUM SERPL-SCNC: 4.6 MMOL/L (ref 3.5–5.1)
RBC # BLD: 2.53 M/CU MM (ref 4.6–6.2)
SODIUM BLD-SCNC: 143 MMOL/L (ref 135–145)
TSH HIGH SENSITIVITY: 5.1 UIU/ML (ref 0.27–4.2)
VITAMIN B-12: 662.2 PG/ML (ref 211–911)
WBC # BLD: 9 K/CU MM (ref 4–10.5)

## 2022-11-02 PROCEDURE — 97530 THERAPEUTIC ACTIVITIES: CPT

## 2022-11-02 PROCEDURE — 84443 ASSAY THYROID STIM HORMONE: CPT

## 2022-11-02 PROCEDURE — 36415 COLL VENOUS BLD VENIPUNCTURE: CPT

## 2022-11-02 PROCEDURE — 82962 GLUCOSE BLOOD TEST: CPT

## 2022-11-02 PROCEDURE — 6370000000 HC RX 637 (ALT 250 FOR IP): Performed by: INTERNAL MEDICINE

## 2022-11-02 PROCEDURE — 99232 SBSQ HOSP IP/OBS MODERATE 35: CPT | Performed by: NURSE PRACTITIONER

## 2022-11-02 PROCEDURE — 6360000002 HC RX W HCPCS: Performed by: NURSE PRACTITIONER

## 2022-11-02 PROCEDURE — 94761 N-INVAS EAR/PLS OXIMETRY MLT: CPT

## 2022-11-02 PROCEDURE — 6370000000 HC RX 637 (ALT 250 FOR IP): Performed by: NURSE PRACTITIONER

## 2022-11-02 PROCEDURE — 2580000003 HC RX 258: Performed by: NURSE PRACTITIONER

## 2022-11-02 PROCEDURE — 85027 COMPLETE CBC AUTOMATED: CPT

## 2022-11-02 PROCEDURE — 82607 VITAMIN B-12: CPT

## 2022-11-02 PROCEDURE — 97110 THERAPEUTIC EXERCISES: CPT

## 2022-11-02 PROCEDURE — 1200000000 HC SEMI PRIVATE

## 2022-11-02 PROCEDURE — 2580000003 HC RX 258: Performed by: PHYSICIAN ASSISTANT

## 2022-11-02 PROCEDURE — 6370000000 HC RX 637 (ALT 250 FOR IP): Performed by: PHYSICIAN ASSISTANT

## 2022-11-02 PROCEDURE — 82140 ASSAY OF AMMONIA: CPT

## 2022-11-02 PROCEDURE — 80048 BASIC METABOLIC PNL TOTAL CA: CPT

## 2022-11-02 RX ORDER — PREGABALIN 50 MG/1
50 CAPSULE ORAL 2 TIMES DAILY
Status: DISCONTINUED | OUTPATIENT
Start: 2022-11-02 | End: 2022-11-05 | Stop reason: HOSPADM

## 2022-11-02 RX ADMIN — CARVEDILOL 6.25 MG: 6.25 TABLET, FILM COATED ORAL at 18:33

## 2022-11-02 RX ADMIN — AMPICILLIN SODIUM 1000 MG: 1 INJECTION, POWDER, FOR SOLUTION INTRAMUSCULAR; INTRAVENOUS at 00:38

## 2022-11-02 RX ADMIN — AMPICILLIN SODIUM 1000 MG: 1 INJECTION, POWDER, FOR SOLUTION INTRAMUSCULAR; INTRAVENOUS at 13:30

## 2022-11-02 RX ADMIN — INSULIN LISPRO 8 UNITS: 100 INJECTION, SOLUTION INTRAVENOUS; SUBCUTANEOUS at 18:33

## 2022-11-02 RX ADMIN — ASPIRIN 81 MG CHEWABLE TABLET 81 MG: 81 TABLET CHEWABLE at 20:47

## 2022-11-02 RX ADMIN — PANTOPRAZOLE SODIUM 40 MG: 40 TABLET, DELAYED RELEASE ORAL at 06:06

## 2022-11-02 RX ADMIN — AMPICILLIN SODIUM 1000 MG: 1 INJECTION, POWDER, FOR SOLUTION INTRAMUSCULAR; INTRAVENOUS at 09:34

## 2022-11-02 RX ADMIN — AMPICILLIN SODIUM 1000 MG: 1 INJECTION, POWDER, FOR SOLUTION INTRAMUSCULAR; INTRAVENOUS at 04:47

## 2022-11-02 RX ADMIN — PREGABALIN 50 MG: 50 CAPSULE ORAL at 20:47

## 2022-11-02 RX ADMIN — SODIUM CHLORIDE, PRESERVATIVE FREE 5 ML: 5 INJECTION INTRAVENOUS at 20:50

## 2022-11-02 RX ADMIN — LEVOTHYROXINE SODIUM 75 MCG: 0.07 TABLET ORAL at 06:06

## 2022-11-02 NOTE — PROGRESS NOTES
Occupational Therapy    Occupational Therapy Treatment Note    Name: Kimberly Das MRN: [id number] :   1940   Date:  2022   Admission Date: 10/24/2022 Room:  [address] [place] [address]-A     Primary Problem:  The primary encounter diagnosis was Hypoglycemia. A diagnosis of Hypothermia, initial encounter was also pertinent to this visit. Restrictions/Precautions:  general precautions, fall risk, tele, external cath     Communication with other providers: Co tx with DION Espinal and RN notified     Subjective:  Patient states:  Pt agreeable to therapy session. Pain:   Location, Type, Intensity (0/10 to 10/10):  denies pain    Objective:    Observation: Pt supine in bed upon arrival.   Objective Measures:  Pt alert and oriented to self. Stated he was in [place] and that there was a bee in the room. Blinds open to increased orientation to awareness of time of day. Treatment, including education:  Therapeutic Activity Training:   Therapeutic activity training was instructed today. Cues were given for safety, sequence, UE/LE placement, awareness, and balance. Activities performed today included bed mobility training, sup-sit, sit-stand, SPT. Pt supine in bed upon arrival and completed bed level stretching as patient appeared very rigid  upon arrival.  Pt completed AAROM BUE to assist in bed mobility. Pt completed bed mobility supine to to sit with MOD A x 2 for sequencing and hand placement. Pt seated edge of bed with close SBA. Pt required MIN A to scoot hips toward edge of bed. Gait belt donned and pt completed sit to stand from edge with WW and MOD A x2 and completed SPT to chair with MOD A x2 with verbal and tactile cues for sequencing and guidance of hips. Pt seated in chair educated on importance of out of bed activity tolerance. Pt declined ADLs this session despite encouragement and education. Pt seated in chair with all needs met and call light in reach and chair alarm on.     Assessment / Impression:    Patient's tolerance of treatment: fair  Adverse Reaction: None  Significant change in status and impact: none  Barriers to improvement: cognition    Plan for Next Session:    Continue OT POC and progress functional ADL transfer and seated grooming edge of bed.     Time in:  0851  Time out:  0916  Timed treatment minutes:  25  Total treatment time:  25      Electronically signed by:    KHARI Larios,   11/2/2022, 9:33 AM

## 2022-11-02 NOTE — PROGRESS NOTES
Physical Therapy    Physical Therapy Treatment Note  Name: Aftab Grullon MRN: [id number] :   1940   Date:  2022   Admission Date: 10/24/2022 Room:  [address] [place] [address]   Restrictions/Precautions:          Fall risk, mild confusion, suprapubic catheter with temp. Monitor and pt has external catheters , tele, IV, fall risk, Contact  Communication with other providers:  per nurse ok to tx. Britany QUIÑONES notified that patient is hallucinating, and was not oriented to place or situation. On Saturday the patient was oriented to place and partially to situation. Attending also perfect served for an [employer]. COTX with Gail Waters for patient safety and tolerance with rehab  Subjective:  Patient states:  pt agreeable and motivated to participate in tx. He states \"Do you hear that bee? \" And swats around his head. He also states \"I'm in [place]". Needs moderate cues to orient patient to place and situation  Pain:   Location, Type, Intensity (0/10 to 10/10):  no c/o pain during tx session  Objective:    Observation:  alert and oriented to self and was able to follow cues for mobility    Treatment, including education/measures:  Supine exercises:  AAROM LTR x10  AAROM SLR with Min resistance during return x10  AAROM Heel slides with moderate resistance with return to neutral x10  PROM ankle DF/PF x10  AAROM shoulder felx, abd, ER x10  AAROM elbow flex x10    Transfers:  Rolling to the Lt: Mod A with mod assist for initiation and rolling effort  Sup to sit with HOB up using bed rails and max assist + Min A of another with BLE and trunk management  SPT: Stand pivot with RW, Mod A x 2, d/t hip, walker and BLE guidance. Verbal and manual cues given for pivot sequence. Stand to sit: Min A x2 with hip guidance to recliner    Bed returned to sup and pt made comfortable. Safety  Patient left safely in the recliner, matheus pad under patient, with call light/phone in reach with alarm applied. Gait belt was used for transfers and gait. Assessment / Impression:  Patient demo's increase confusion and decrease mental status today. Patient's tolerance of treatment:  Fair   Adverse Reaction: na  Significant change in status and impact:  na  Barriers to improvement:  strength and safety  Plan for Next Session:    Cont. POC  Time in:  0851  Time out:  0916  Timed treatment minutes:  25  Total treatment time:  25    Previously filed items:           Short Term Goals  Time Frame for Short Term Goals: 1 week  Short Term Goal 1: Pt will perform sup>sit with Min A and cues  Short Term Goal 2: Pt will perform standing balance tasks x5' with UE support and Min A  Short Term Goal 3: Pt will AMB x15 ft with RW, chair follow, Mod A    Electronically signed by:     Nolberto Brown PTA PTA  11/2/2022, 10:00 AM

## 2022-11-02 NOTE — PLAN OF CARE
Problem: Discharge Planning  Goal: Discharge to home or other facility with appropriate resources  Outcome: Progressing     Problem: Safety - Adult  Goal: Free from fall injury  Outcome: Progressing     Problem: Skin/Tissue Integrity  Goal: Absence of new skin breakdown  Description: 1. Monitor for areas of redness and/or skin breakdown  2. Assess vascular access sites hourly  3. Every 4-6 hours minimum:  Change oxygen saturation probe site  4. Every 4-6 hours:  If on nasal continuous positive airway pressure, respiratory therapy assess nares and determine need for appliance change or resting period. Outcome: Progressing     Problem: ABCDS Injury Assessment  Goal: Absence of physical injury  Outcome: Progressing     Problem: Confusion  Goal: Confusion, delirium, dementia, or psychosis is improved or at baseline  Description: INTERVENTIONS:  1. Assess for possible contributors to thought disturbance, including medications, impaired vision or hearing, underlying metabolic abnormalities, dehydration, psychiatric diagnoses, and notify attending LIP  2. [place] high risk fall precautions, as indicated  3. Provide frequent short contacts to provide reality reorientation, refocusing and direction  4. Decrease environmental stimuli, including noise as appropriate  5. Monitor and intervene to maintain adequate nutrition, hydration, elimination, sleep and activity  6. If unable to ensure safety without constant attention obtain sitter and review sitter guidelines with assigned personnel  7.  Initiate Psychosocial CNS and Spiritual Care consult, as indicated  Outcome: Progressing     Problem: Chronic Conditions and Co-morbidities  Goal: Patient's chronic conditions and co-morbidity symptoms are monitored and maintained or improved  Outcome: Progressing     Problem: Nutrition Deficit:  Goal: Optimize nutritional status  Outcome: Progressing

## 2022-11-02 NOTE — PROGRESS NOTES
Infectious Disease Progress Note  2022   Patient Name: Des Enriquez : 1940   Impression  Enterococcus raffinosus in  Blood Cultures, Probable Contaminant:  Left Pyelonephritis Secondary to Chronic Left Hydronephrosis, Chronic Urinary Retention:  Recent Fungemia 22:  Recent Recurrent ESBL E.coli:  Afebrile, no leukocytosis  CRP remains low  Pct on DWT  10/24-Urine culture: NGTD  10/24-BC  Enterococcus raffinosus   -BC 0 NGTD  10/24-CT chest, A&P W IV Contrast: Negative for pneumonia. Mild bibasilar opacities which are most likely   atelectasis. Small fixed hiatus hernia. 1. Left urinary tract stent is in place. Probable pyelonephritis of the left   Kidney  Dr. Niall Garcia onboard for urology, rrec cystoscopy/left stent exchange in the next few weeks after appropriate ABX. Mgt with ureteral stent exchange every 3 months. DMII:  Delirium:  CKD3:  Tobacco Abuse:  H/o Prostate Cancer S/p Prostatectomy 1996:  HTN:  Dementia:  Multi-morbidity: per PMHx: s/p cervical spine fusion, recurrent UTIs with SPC  Plan:  DC IV ampicillin as blood cultures returned 0 NGTD, will consider BC from 10/24 as contaminants, but if were pathogens, did receive a full course of 9 days of ABX  Trend CRP and Pct, ordered  Reviewed CBC with dif and BMP today  Follow up with PCP  OK from ID standpoint to DC when ready  Will sign off and not follow the patient actively, please reconsult as needed. Ongoing Antimicrobial Therapy  ? Completed Antimicrobial Therapy  Meropenem 10/24-? Ampicillin 10/28-? History:? Interval history noted. Chief complaint: E.raffinosus in blood cultures. Possible left pyelonephritis. Denies n/v/d/f or untoward effects of antibiotics. States is feeling good today, denies any pain or dysuria.    Physical Exam:  Vital Signs: BP (!) 158/84   Pulse 68   Temp 98.1 °F (36.7 °C)   Resp 18   Ht 5' 8\" (1.727 m)   Wt 200 lb 9.9 oz (91 kg)   SpO2 100%   BMI 30.50 kg/m² Gen: alert, pleasantly confused, up in the chair. Chest: no distress and CTA. Good air movement. Heart: RRR and no MRG. Abd: soft, non-distended, left CVA tenderness resolved. no hepatomegaly. Normoactive bowel sounds. Ext: no clubbing, cyanosis, or edema  Supra-Pubic Catheter Site: without erythema or tenderness draining clear yellow urine  Neuro: Mental status intact. CN 2-12 intact and no focal sensory or motor deficits     Radiologic / Imaging / TESTING  1. No active pulmonary disease. 10/24/22 CT Head WO Contrast:  Impression   No acute intracranial abnormality. 10/24/22 CT Chest Abdomen Pelvis W Contrast:  Impression       Negative for pneumonia. Mild bibasilar opacities which are most likely   atelectasis. Small fixed hiatus hernia. Abdomen/Pelvis:       Liver: Liver is normal density. No enhancing masses. Normal enhancement of   the intrahepatic vasculature. Spleen:  Normal size. No enhancing masses calcified granulomata in the   spleen. Pancreas: No enhancing masses. No ductal dilation. No adjacent fatty   stranding. Gallbladder no calcified stones or sludge. No pericholecystic fluid. No   wall thickening. Bile ducts: No biliary ductal dilation       Adrenals: The adrenal glands are unremarkable       Kidneys: Urinary tract stent on the left. The left kidney is mildly   atrophic. Perinephric stranding on the left and mild decreased enhancement   of the cortex when compared to the right. However, no hydronephrosis. No   large mass. Heterogeneity of the contrast enhancement on the left. Left   urinary tract stent extends into the bladder. No stones along the pathway of   the left ureter. GI: No small bowel dilation. No colonic wall thickening. No large mass. The stomach is unremarkable in appearance but it is underdistended. Moderate   amount of stool throughout the colon. Mesentery: No enlarged lymphadenopathy.  No free fluid. No free gas. Aorta: Aorta is of normal size. Negative for dissection. IVC is   unremarkable. Celiac axis and SMA are patent. Portal vein is patent. Atherosclerotic change of the aorta in the renal arteries. PELVIS       GI: No small bowel dilation. No colonic wall thickening. No enlarged   lymphadenopathy. Small amount of free fluid in the pelvis. : The bladder is unremarkable in appearance. No large mass. Suprapubic   catheter. The bladder is completely decompressed. Prostate appears to have   been resected. Phleboliths in the pelvis. Multiple clips in the pelvis. No   enlarged lymphadenopathy. Osseous: Bone island in the right ilium. No lytic destructive lesions. Bone   island in the right acetabulum also unchanged. IMPRESSION:   1. Left urinary tract stent is in place. Probable pyelonephritis of the left   kidney.         Labs:    Recent Results (from the past 24 hour(s))   POCT Glucose    Collection Time: 11/01/22  4:17 PM   Result Value Ref Range    POC Glucose 305 (H) 70 - 99 MG/DL   POCT Glucose    Collection Time: 11/01/22  8:43 PM   Result Value Ref Range    POC Glucose 131 (H) 70 - 99 MG/DL   POCT Glucose    Collection Time: 11/02/22 12:31 AM   Result Value Ref Range    POC Glucose 59 (L) 70 - 99 MG/DL   POCT Glucose    Collection Time: 11/02/22  1:17 AM   Result Value Ref Range    POC Glucose 74 70 - 99 MG/DL   CBC    Collection Time: 11/02/22  4:35 AM   Result Value Ref Range    WBC 9.0 4.0 - 10.5 K/CU MM    RBC 2.53 (L) 4.6 - 6.2 M/CU MM    Hemoglobin 7.6 (L) 13.5 - 18.0 GM/DL    Hematocrit 25.1 (L) 42 - 52 %    MCV 99.2 78 - 100 FL    MCH 30.0 27 - 31 PG    MCHC 30.3 (L) 32.0 - 36.0 %    RDW 16.5 (H) 11.7 - 14.9 %    Platelets 579 382 - 866 K/CU MM    MPV 11.6 (H) 7.5 - 11.1 FL   Basic Metabolic Panel    Collection Time: 11/02/22  4:35 AM   Result Value Ref Range    Sodium 143 135 - 145 MMOL/L    Potassium 4.6 3.5 - 5.1 MMOL/L    Chloride 109 99 - 110 mMol/L    CO2 21 21 - 32 MMOL/L    Anion Gap 13 4 - 16    BUN 33 (H) 6 - 23 MG/DL    Creatinine 1.9 (H) 0.9 - 1.3 MG/DL    Est, Glom Filt Rate 34 (L) >60 mL/min/1.73m2    Glucose 142 (H) 70 - 99 MG/DL    Calcium 9.1 8.3 - 10.6 MG/DL   TSH    Collection Time: 11/02/22  4:35 AM   Result Value Ref Range    TSH, High Sensitivity 5.100 (H) 0.270 - 4.20 uIu/ml   Vitamin B12    Collection Time: 11/02/22  4:35 AM   Result Value Ref Range    Vitamin B-12 662.2 211 - 911 pg/ml   Ammonia    Collection Time: 11/02/22  4:35 AM   Result Value Ref Range    Ammonia 21 16 - 60 UMOL/L   POCT Glucose    Collection Time: 11/02/22  4:50 AM   Result Value Ref Range    POC Glucose 136 (H) 70 - 99 MG/DL   POCT Glucose    Collection Time: 11/02/22  6:29 AM   Result Value Ref Range    POC Glucose 135 (H) 70 - 99 MG/DL   POCT Glucose    Collection Time: 11/02/22 11:05 AM   Result Value Ref Range    POC Glucose 230 (H) 70 - 99 MG/DL     CULTURE results: Invalid input(s): BLOOD CULTURE,  URINE CULTURE, SURGICAL CULTURE    Diagnosis:  Patient Active Problem List   Diagnosis    Gait disturbance    Generalized weakness    Diabetes mellitus (HCC)    Osteoarthritis    Anemia of chronic disorder    Infected implanted bladder sphincter cuff    Escherichia coli urinary tract infection    Hypertension    Sepsis ([facility] [address]    Acute encephalopathy    Type 2 diabetes mellitus with hypoglycemia without coma (HCC)    UTI (urinary tract infection) due to urinary indwelling catheter ([facility])    Hyperkalemia    Acute kidney failure (HCC)    Leg weakness, bilateral    Type 2 diabetes mellitus with neurological manifestations, uncontrolled    Complicated UTI (urinary tract infection)    Essential hypertension    Severe muscle deconditioning    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 (HCC)    Frequent falls    Chronic kidney disease, stage 3a ([address]    Uncontrolled type 2 diabetes mellitus with peripheral neuropathy    Prostate cancer (HCC)    Suprapubic catheter ([facility] [address]    Sepsis due to urinary tract infection (HCC)    Coagulase negative Staphylococcus bacteremia    Chronic kidney disease, stage IV (severe) (HCC)    Acute metabolic encephalopathy    SVT (supraventricular tachycardia) (HCC)    Metabolic encephalopathy    History of prostate cancer    Cigarette nicotine dependence without complication    Altered mental status    Serratia marcescens infection    Hypoglycemia    Hospital discharge follow-up    Fungemia    Pyelonephritis    Bacteremia due to Enterococcus       Active Problems  Principal Problem:    Hypoglycemia  Active Problems:    Pyelonephritis    Bacteremia due to Enterococcus  Resolved Problems:    * No resolved hospital problems. *    Electronically signed by: Electronically signed by VIVIANA Kingston CNP on 11/2/2022 at 12:31 PM

## 2022-11-03 ENCOUNTER — APPOINTMENT (OUTPATIENT)
Dept: MRI IMAGING | Age: 82
DRG: 637 | End: 2022-11-03
Payer: MEDICARE

## 2022-11-03 ENCOUNTER — APPOINTMENT (OUTPATIENT)
Dept: CT IMAGING | Age: 82
DRG: 637 | End: 2022-11-03
Payer: MEDICARE

## 2022-11-03 LAB
ANION GAP SERPL CALCULATED.3IONS-SCNC: 9 MMOL/L (ref 4–16)
BASOPHILS ABSOLUTE: 0.1 K/CU MM
BASOPHILS RELATIVE PERCENT: 0.6 % (ref 0–1)
BUN BLDV-MCNC: 34 MG/DL (ref 6–23)
C-REACTIVE PROTEIN, HIGH SENSITIVITY: 11 MG/L
CALCIUM SERPL-MCNC: 8.8 MG/DL (ref 8.3–10.6)
CHLORIDE BLD-SCNC: 109 MMOL/L (ref 99–110)
CO2: 23 MMOL/L (ref 21–32)
CREAT SERPL-MCNC: 1.8 MG/DL (ref 0.9–1.3)
DIFFERENTIAL TYPE: ABNORMAL
EOSINOPHILS ABSOLUTE: 0.8 K/CU MM
EOSINOPHILS RELATIVE PERCENT: 9 % (ref 0–3)
GFR SERPL CREATININE-BSD FRML MDRD: 37 ML/MIN/1.73M2
GLUCOSE BLD-MCNC: 119 MG/DL (ref 70–99)
GLUCOSE BLD-MCNC: 187 MG/DL (ref 70–99)
GLUCOSE BLD-MCNC: 192 MG/DL (ref 70–99)
GLUCOSE BLD-MCNC: 219 MG/DL (ref 70–99)
GLUCOSE BLD-MCNC: 237 MG/DL (ref 70–99)
GLUCOSE BLD-MCNC: 269 MG/DL (ref 70–99)
GLUCOSE BLD-MCNC: 42 MG/DL (ref 70–99)
GLUCOSE BLD-MCNC: 51 MG/DL (ref 70–99)
GLUCOSE BLD-MCNC: 53 MG/DL (ref 70–99)
GLUCOSE BLD-MCNC: 97 MG/DL (ref 70–99)
HCT VFR BLD CALC: 21.8 % (ref 42–52)
HCT VFR BLD CALC: 26.1 % (ref 42–52)
HEMOGLOBIN: 6.9 GM/DL (ref 13.5–18)
HEMOGLOBIN: 8.4 GM/DL (ref 13.5–18)
IMMATURE NEUTROPHIL %: 0.3 % (ref 0–0.43)
LYMPHOCYTES ABSOLUTE: 1.9 K/CU MM
LYMPHOCYTES RELATIVE PERCENT: 20.7 % (ref 24–44)
MCH RBC QN AUTO: 30.8 PG (ref 27–31)
MCHC RBC AUTO-ENTMCNC: 31.7 % (ref 32–36)
MCV RBC AUTO: 97.3 FL (ref 78–100)
MONOCYTES ABSOLUTE: 0.6 K/CU MM
MONOCYTES RELATIVE PERCENT: 7 % (ref 0–4)
NUCLEATED RBC %: 0 %
PDW BLD-RTO: 17 % (ref 11.7–14.9)
PLATELET # BLD: 209 K/CU MM (ref 140–440)
PMV BLD AUTO: 11.2 FL (ref 7.5–11.1)
POTASSIUM SERPL-SCNC: 3.9 MMOL/L (ref 3.5–5.1)
RBC # BLD: 2.24 M/CU MM (ref 4.6–6.2)
REASON FOR REJECTION: NORMAL
REJECTED TEST: NORMAL
SEGMENTED NEUTROPHILS ABSOLUTE COUNT: 5.6 K/CU MM
SEGMENTED NEUTROPHILS RELATIVE PERCENT: 62.4 % (ref 36–66)
SODIUM BLD-SCNC: 141 MMOL/L (ref 135–145)
T4 FREE: 1.48 NG/DL (ref 0.9–1.8)
TOTAL IMMATURE NEUTOROPHIL: 0.03 K/CU MM
TOTAL NUCLEATED RBC: 0 K/CU MM
WBC # BLD: 8.9 K/CU MM (ref 4–10.5)

## 2022-11-03 PROCEDURE — 6370000000 HC RX 637 (ALT 250 FOR IP): Performed by: INTERNAL MEDICINE

## 2022-11-03 PROCEDURE — 83540 ASSAY OF IRON: CPT

## 2022-11-03 PROCEDURE — 83550 IRON BINDING TEST: CPT

## 2022-11-03 PROCEDURE — 92610 EVALUATE SWALLOWING FUNCTION: CPT

## 2022-11-03 PROCEDURE — 2500000003 HC RX 250 WO HCPCS: Performed by: INTERNAL MEDICINE

## 2022-11-03 PROCEDURE — 36430 TRANSFUSION BLD/BLD COMPNT: CPT

## 2022-11-03 PROCEDURE — 6360000004 HC RX CONTRAST MEDICATION: Performed by: SURGERY

## 2022-11-03 PROCEDURE — 2580000003 HC RX 258: Performed by: SURGERY

## 2022-11-03 PROCEDURE — 85045 AUTOMATED RETICULOCYTE COUNT: CPT

## 2022-11-03 PROCEDURE — 85018 HEMOGLOBIN: CPT

## 2022-11-03 PROCEDURE — 6370000000 HC RX 637 (ALT 250 FOR IP): Performed by: SURGERY

## 2022-11-03 PROCEDURE — 70551 MRI BRAIN STEM W/O DYE: CPT

## 2022-11-03 PROCEDURE — 1200000000 HC SEMI PRIVATE

## 2022-11-03 PROCEDURE — 70498 CT ANGIOGRAPHY NECK: CPT

## 2022-11-03 PROCEDURE — P9016 RBC LEUKOCYTES REDUCED: HCPCS

## 2022-11-03 PROCEDURE — 70450 CT HEAD/BRAIN W/O DYE: CPT

## 2022-11-03 PROCEDURE — 83036 HEMOGLOBIN GLYCOSYLATED A1C: CPT

## 2022-11-03 PROCEDURE — 86140 C-REACTIVE PROTEIN: CPT

## 2022-11-03 PROCEDURE — 85025 COMPLETE CBC W/AUTO DIFF WBC: CPT

## 2022-11-03 PROCEDURE — 2580000003 HC RX 258: Performed by: INTERNAL MEDICINE

## 2022-11-03 PROCEDURE — 86850 RBC ANTIBODY SCREEN: CPT

## 2022-11-03 PROCEDURE — 80048 BASIC METABOLIC PNL TOTAL CA: CPT

## 2022-11-03 PROCEDURE — 86900 BLOOD TYPING SEROLOGIC ABO: CPT

## 2022-11-03 PROCEDURE — 85027 COMPLETE CBC AUTOMATED: CPT

## 2022-11-03 PROCEDURE — 36415 COLL VENOUS BLD VENIPUNCTURE: CPT

## 2022-11-03 PROCEDURE — 82746 ASSAY OF FOLIC ACID SERUM: CPT

## 2022-11-03 PROCEDURE — 2580000003 HC RX 258: Performed by: PHYSICIAN ASSISTANT

## 2022-11-03 PROCEDURE — 99223 1ST HOSP IP/OBS HIGH 75: CPT | Performed by: PSYCHIATRY & NEUROLOGY

## 2022-11-03 PROCEDURE — 82962 GLUCOSE BLOOD TEST: CPT

## 2022-11-03 PROCEDURE — 84439 ASSAY OF FREE THYROXINE: CPT

## 2022-11-03 PROCEDURE — 6370000000 HC RX 637 (ALT 250 FOR IP): Performed by: PHYSICIAN ASSISTANT

## 2022-11-03 PROCEDURE — 6370000000 HC RX 637 (ALT 250 FOR IP): Performed by: NURSE PRACTITIONER

## 2022-11-03 PROCEDURE — 85014 HEMATOCRIT: CPT

## 2022-11-03 PROCEDURE — 82607 VITAMIN B-12: CPT

## 2022-11-03 PROCEDURE — 86901 BLOOD TYPING SEROLOGIC RH(D): CPT

## 2022-11-03 PROCEDURE — 94761 N-INVAS EAR/PLS OXIMETRY MLT: CPT

## 2022-11-03 PROCEDURE — 86922 COMPATIBILITY TEST ANTIGLOB: CPT

## 2022-11-03 PROCEDURE — 82728 ASSAY OF FERRITIN: CPT

## 2022-11-03 PROCEDURE — 6360000002 HC RX W HCPCS: Performed by: SURGERY

## 2022-11-03 RX ORDER — SODIUM CHLORIDE 9 MG/ML
INJECTION, SOLUTION INTRAVENOUS CONTINUOUS
Status: DISPENSED | OUTPATIENT
Start: 2022-11-03 | End: 2022-11-03

## 2022-11-03 RX ORDER — DEXTROSE MONOHYDRATE 50 MG/ML
INJECTION, SOLUTION INTRAVENOUS CONTINUOUS
Status: DISCONTINUED | OUTPATIENT
Start: 2022-11-03 | End: 2022-11-03

## 2022-11-03 RX ORDER — HEPARIN SODIUM 5000 [USP'U]/ML
5000 INJECTION, SOLUTION INTRAVENOUS; SUBCUTANEOUS EVERY 8 HOURS SCHEDULED
Status: DISCONTINUED | OUTPATIENT
Start: 2022-11-03 | End: 2022-11-05 | Stop reason: HOSPADM

## 2022-11-03 RX ORDER — CLONIDINE HYDROCHLORIDE 0.1 MG/1
0.1 TABLET ORAL EVERY 8 HOURS PRN
Status: DISCONTINUED | OUTPATIENT
Start: 2022-11-03 | End: 2022-11-05 | Stop reason: HOSPADM

## 2022-11-03 RX ORDER — CARVEDILOL 6.25 MG/1
12.5 TABLET ORAL 2 TIMES DAILY WITH MEALS
Status: DISCONTINUED | OUTPATIENT
Start: 2022-11-03 | End: 2022-11-03

## 2022-11-03 RX ORDER — SODIUM CHLORIDE 9 MG/ML
INJECTION, SOLUTION INTRAVENOUS PRN
Status: DISCONTINUED | OUTPATIENT
Start: 2022-11-03 | End: 2022-11-05 | Stop reason: HOSPADM

## 2022-11-03 RX ORDER — ATORVASTATIN CALCIUM 40 MG/1
80 TABLET, FILM COATED ORAL NIGHTLY
Status: DISCONTINUED | OUTPATIENT
Start: 2022-11-03 | End: 2022-11-05 | Stop reason: HOSPADM

## 2022-11-03 RX ORDER — LABETALOL HYDROCHLORIDE 5 MG/ML
10 INJECTION, SOLUTION INTRAVENOUS
Status: DISCONTINUED | OUTPATIENT
Start: 2022-11-03 | End: 2022-11-05 | Stop reason: HOSPADM

## 2022-11-03 RX ORDER — INSULIN LISPRO 100 [IU]/ML
0-4 INJECTION, SOLUTION INTRAVENOUS; SUBCUTANEOUS
Status: DISCONTINUED | OUTPATIENT
Start: 2022-11-03 | End: 2022-11-04

## 2022-11-03 RX ORDER — AMLODIPINE BESYLATE 5 MG/1
5 TABLET ORAL 2 TIMES DAILY
Status: DISCONTINUED | OUTPATIENT
Start: 2022-11-03 | End: 2022-11-05 | Stop reason: HOSPADM

## 2022-11-03 RX ORDER — HYDRALAZINE HYDROCHLORIDE 20 MG/ML
10 INJECTION INTRAMUSCULAR; INTRAVENOUS
Status: DISCONTINUED | OUTPATIENT
Start: 2022-11-03 | End: 2022-11-05 | Stop reason: HOSPADM

## 2022-11-03 RX ORDER — AMLODIPINE BESYLATE 5 MG/1
5 TABLET ORAL DAILY
Status: DISCONTINUED | OUTPATIENT
Start: 2022-11-03 | End: 2022-11-03

## 2022-11-03 RX ORDER — LABETALOL HYDROCHLORIDE 5 MG/ML
10 INJECTION, SOLUTION INTRAVENOUS ONCE
Status: COMPLETED | OUTPATIENT
Start: 2022-11-03 | End: 2022-11-03

## 2022-11-03 RX ORDER — LABETALOL HYDROCHLORIDE 5 MG/ML
10 INJECTION, SOLUTION INTRAVENOUS
Status: DISCONTINUED | OUTPATIENT
Start: 2022-11-03 | End: 2022-11-03

## 2022-11-03 RX ORDER — INSULIN LISPRO 100 [IU]/ML
0-4 INJECTION, SOLUTION INTRAVENOUS; SUBCUTANEOUS NIGHTLY
Status: DISCONTINUED | OUTPATIENT
Start: 2022-11-03 | End: 2022-11-03

## 2022-11-03 RX ADMIN — ATORVASTATIN CALCIUM 80 MG: 40 TABLET, FILM COATED ORAL at 20:52

## 2022-11-03 RX ADMIN — SODIUM CHLORIDE: 9 INJECTION, SOLUTION INTRAVENOUS at 10:06

## 2022-11-03 RX ADMIN — HEPARIN SODIUM 5000 UNITS: 5000 INJECTION INTRAVENOUS; SUBCUTANEOUS at 06:55

## 2022-11-03 RX ADMIN — PANTOPRAZOLE SODIUM 40 MG: 40 TABLET, DELAYED RELEASE ORAL at 06:53

## 2022-11-03 RX ADMIN — PREGABALIN 50 MG: 50 CAPSULE ORAL at 20:55

## 2022-11-03 RX ADMIN — AMLODIPINE BESYLATE 5 MG: 5 TABLET ORAL at 15:14

## 2022-11-03 RX ADMIN — CARVEDILOL 6.25 MG: 6.25 TABLET, FILM COATED ORAL at 15:13

## 2022-11-03 RX ADMIN — LABETALOL HYDROCHLORIDE 10 MG: 5 INJECTION, SOLUTION INTRAVENOUS at 11:19

## 2022-11-03 RX ADMIN — DEXTROSE MONOHYDRATE: 50 INJECTION, SOLUTION INTRAVENOUS at 05:45

## 2022-11-03 RX ADMIN — IOPAMIDOL 60 ML: 755 INJECTION, SOLUTION INTRAVENOUS at 05:40

## 2022-11-03 RX ADMIN — ASPIRIN 81 MG CHEWABLE TABLET 81 MG: 81 TABLET CHEWABLE at 20:52

## 2022-11-03 RX ADMIN — DEXTROSE MONOHYDRATE 125 ML: 10 INJECTION, SOLUTION INTRAVENOUS at 04:44

## 2022-11-03 RX ADMIN — SODIUM CHLORIDE, PRESERVATIVE FREE 10 ML: 5 INJECTION INTRAVENOUS at 20:55

## 2022-11-03 RX ADMIN — LEVOTHYROXINE SODIUM 75 MCG: 0.07 TABLET ORAL at 06:53

## 2022-11-03 RX ADMIN — GLUCAGON HYDROCHLORIDE 1 MG: KIT at 04:45

## 2022-11-03 RX ADMIN — HEPARIN SODIUM 5000 UNITS: 5000 INJECTION INTRAVENOUS; SUBCUTANEOUS at 14:36

## 2022-11-03 RX ADMIN — HEPARIN SODIUM 5000 UNITS: 5000 INJECTION INTRAVENOUS; SUBCUTANEOUS at 20:55

## 2022-11-03 RX ADMIN — PREGABALIN 50 MG: 50 CAPSULE ORAL at 15:14

## 2022-11-03 RX ADMIN — AMLODIPINE BESYLATE 5 MG: 5 TABLET ORAL at 20:52

## 2022-11-03 ASSESSMENT — PAIN SCALES - GENERAL: PAINLEVEL_OUTOF10: 0

## 2022-11-03 NOTE — PROGRESS NOTES
[place]2.0  [facility] [place]      Name:  Sophia Russ /Age/Sex: 1940  (78 y.o. male)   MRN & CSN:  [id number] & [id number] Encounter Date/Time: 11/3/2022 8:07 AM EDT    Location:  [address] [place] [address]A PCP: Orin Valdes MD       Hospital Day: 11    Assessment and Plan:   Sophia Russ is a 78 y.o. male with pmh of recent Candida UTI and Candida fungemia who presents with hypoglycemia    November 3 update-rapid response called this morning. Had been without Lyrica for about 10 days, it was restarted last night. Rapid response called for lethargy today. Upon arrival to staff the does sternal rub and he woke up. Blood pressure was very high. Consulted nephrology - d/w Dr. Nevin Foote. He appears to have an encephalopathy. MRI came back negative. Discussed with neurology. -Repeat blood cultures are pending.    -Hemoglobin came back 6.9. Transfusion ordered. We will consult hematology in the morning. -received a message from physical therapy that confusion is worse  -He was apparently on Lyrica at home. The dose was 50 mg twice daily. He is somewhat agitated today. We will restart Lyrica. He has been without it for about 9 days.  -We will consult neurology. Plan:  Acute severe and refractory hypoglycemia, can be secondary to decreased p.o. intake and with Lantus use. No more episodes of hypoglycemia in last 24 hours. Acute metabolic encephalopathy S/T severe and refractory hypoglycemia, Resolved. CT chest, abdomen, pelvis ordered showed probable pyelonephritis of L kidney, was initially started on meropenem but urine cultures were negative so was discontinued. Cystoscopy/left ureteral stent exchange as outpatient     3. Enterococcus raffinosus bacteremia: 1 out of 4 bottles were positive for it, will start patient on ampicillin and consult ID. Repeat blood cultures are negative     4.  Elevated troponin  trop T 0.044, chronically elevated in setting of CKD, EKG with sinus bradycardia, no acute ST T wave changes     5. Chronic anemia - Hgb 8.0, near baseline   6. CKDIII Cr 2.0, near baseline avoid nephrotoxins       Diet ADULT DIET; Regular; 5 carb choices (75 gm/meal)  ADULT ORAL NUTRITION SUPPLEMENT; Lunch, Dinner; Diabetic Oral Supplement   DVT Prophylaxis [x] Lovenox, []  Heparin, [] SCDs, [] Ambulation,  [] Eliquis, [] Xarelto  [] Coumadin   Code Status Full Code   Disposition From: SNF  Expected Disposition: SNF  Estimated Date of Discharge: in 1-2 days  Patient requires continued admission due to the rapid response today. Blood pressure very high. Await negative blood cultures. Surrogate Decision Maker/ POA Greg his Child listed as alternate contact in chart     Subjective:     Chief Complaint: Hypoglycemia (39 per ems upon arrival. Glucagon given at nursing home and D10 given per medics)       Aristeo Echeverria is a 78 y.o. male     November 2:    When I saw him he said that his son is coming to pick him up and take him home. I did speak with his son. That was not the case. November 3: Had a rapid response because he was unresponsive. Review of Systems:    Review of Systems    No vomiting or bleeding reported. ED triage note:      Patient to the ED from the [facility] via EMS. Patient was found to have altered mental status this morning with a BG of 50 per the nursing home. Nursing staff gave patient glucagon. When EMS arrived patient BG was 34 per EMS. Ems started D10 and got BG to 84 PTA. Patient BG 83 upon arrival. Patient is alert and oriented x4 upon arrival. VS stable. ED provider diagnosis:      Clinical Impression:  1. Hypoglycemia    2.  Hypothermia, initial encounter        Was in the ICU from 10/24 until 10/28 - was on D10 at 100 mL per hour for about 36 hours  Was on 3 N 10/28 until 10/31    Came to 1 N 10/31    Per ID note 10/30:  Await repeat BC re-ordered on 10/31, if negative, will DC ampicillin      BC:    ENTEROCOCCUS RAFFINOSUS POSITIVE for Isolated one of two sets Abnormal             Objective:   No intake or output data in the 24 hours ending 11/03/22 1010       Vitals:   Vitals:    11/03/22 0800   BP: (!) 107/56   Pulse: 55   Resp: 21   Temp: 98.2 °F (36.8 °C)   SpO2: 100%       Physical Exam:     Physical Exam  Constitutional:       Appearance: He is not diaphoretic. Pulmonary:      Effort: Pulmonary effort is normal.   Neurological:      Cranial Nerves: No cranial nerve deficit. After he had the sternal rub he woke up and had no cranial nerve deficits.       Medications:   Medications:    [Held by provider] insulin lispro  0-4 Units SubCUTAneous TID WC    [Held by provider] insulin lispro  0-4 Units SubCUTAneous Nightly    heparin (porcine)  5,000 Units SubCUTAneous 3 times per day    atorvastatin  80 mg Oral Nightly    amLODIPine  5 mg Oral Daily    pregabalin  50 mg Oral BID    pantoprazole  40 mg Oral QAM AC    sodium chloride flush  5-40 mL IntraVENous 2 times per day    dextrose bolus  250 mL IntraVENous Once    carvedilol  6.25 mg Oral BID WC    ferrous sulfate  325 mg Oral Daily with breakfast    torsemide  20 mg Oral Once per day on Mon Wed Fri    levothyroxine  75 mcg Oral Daily    aspirin  81 mg Oral Nightly      Infusions:    sodium chloride 100 mL/hr at 11/03/22 1006    sodium chloride 75 mL/hr at 11/01/22 2046    dextrose       PRN Meds: labetalol, 10 mg, Q2H PRN  hydrALAZINE, 10 mg, Q2H PRN  dextrose bolus, 125 mL, PRN   Or  dextrose bolus, 250 mL, PRN  sodium chloride flush, 5-40 mL, PRN  sodium chloride, , PRN  ondansetron, 4 mg, Q8H PRN   Or  ondansetron, 4 mg, Q6H PRN  polyethylene glycol, 17 g, Daily PRN  acetaminophen, 650 mg, Q6H PRN   Or  acetaminophen, 650 mg, Q6H PRN  glucose, 4 tablet, PRN  dextrose bolus, 125 mL, PRN   Or  dextrose bolus, 250 mL, PRN  glucagon (rDNA), 1 mg, PRN  dextrose, , Continuous PRN      Labs      Recent Results (from the past 24 hour(s))   POCT Glucose    Collection Time: 11/02/22 11:05 AM   Result Value Ref Range    POC Glucose 230 (H) 70 - 99 MG/DL   POCT Glucose    Collection Time: 11/02/22  4:32 PM   Result Value Ref Range    POC Glucose 272 (H) 70 - 99 MG/DL   POCT Glucose    Collection Time: 11/02/22  7:55 PM   Result Value Ref Range    POC Glucose 254 (H) 70 - 99 MG/DL   POCT Glucose    Collection Time: 11/03/22 12:51 AM   Result Value Ref Range    POC Glucose 97 70 - 99 MG/DL   POCT Glucose    Collection Time: 11/03/22  4:10 AM   Result Value Ref Range    POC Glucose 42 (L) 70 - 99 MG/DL   POCT Glucose    Collection Time: 11/03/22  4:34 AM   Result Value Ref Range    POC Glucose 51 (L) 70 - 99 MG/DL   POCT Glucose    Collection Time: 11/03/22  4:39 AM   Result Value Ref Range    POC Glucose 53 (L) 70 - 99 MG/DL   POCT Glucose    Collection Time: 11/03/22  4:53 AM   Result Value Ref Range    POC Glucose 119 (H) 70 - 99 MG/DL   POCT Glucose    Collection Time: 11/03/22  5:51 AM   Result Value Ref Range    POC Glucose 269 (H) 70 - 99 MG/DL   CBC    Collection Time: 11/03/22  9:36 AM   Result Value Ref Range    WBC 8.9 4.0 - 10.5 K/CU MM    RBC 2.24 (L) 4.6 - 6.2 M/CU MM    Hemoglobin 6.9 (LL) 13.5 - 18.0 GM/DL    Hematocrit 21.8 (L) 42 - 52 %    MCV 97.3 78 - 100 FL    MCH 30.8 27 - 31 PG    MCHC 31.7 (L) 32.0 - 36.0 %    RDW 17.0 (H) 11.7 - 14.9 %    Platelets 786 926 - 501 K/CU MM    MPV 11.2 (H) 7.5 - 11.1 FL   Differential with WBC    Collection Time: 11/03/22  9:36 AM   Result Value Ref Range    Differential Type AUTOMATED DIFFERENTIAL     Segs Relative 62.4 36 - 66 %    Lymphocytes % 20.7 (L) 24 - 44 %    Monocytes % 7.0 (H) 0 - 4 %    Eosinophils % 9.0 (H) 0 - 3 %    Basophils % 0.6 0 - 1 %    Segs Absolute 5.6 K/CU MM    Lymphocytes Absolute 1.9 K/CU MM    Monocytes Absolute 0.6 K/CU MM    Eosinophils Absolute 0.8 K/CU MM    Basophils Absolute 0.1 K/CU MM    Nucleated RBC % 0.0 %    Total Nucleated RBC 0.0 K/CU MM    Total Immature Neutrophil 0.03 K/CU MM    Immature Neutrophil % 0.3 0 - 0.43 %        Imaging/Diagnostics Last 24 Hours   No results found.     Electronically signed by Keyona Mcdonald MD on 11/3/2022 at 10:10 AM

## 2022-11-03 NOTE — CODE DOCUMENTATION
Patient minimally responsive to painful stimuli. Rapid called. Patient now responsive and answering questions.

## 2022-11-03 NOTE — PROGRESS NOTES
Rapid response called for low blood sugar and increased blood pressure; pt responsive to voice only but keeps eyes closed. Glucagon given SQ and bolus of 125 ml of dextrose. Blood sugar came up to 119 but pt still not responding well. After assessment, Stroke alert called.

## 2022-11-03 NOTE — CONSULTS
Endocrinology   Consult Note  10/24/2022 10:55 AM     Primary Care provider: Solo Pace MD     Referring physician:  Yoselyn Hayes MD     Dear Doctor Judy Lerma     Thank You for the Consult     Admitted for Altered mental State       Reason for Consult:  Better control of Hypoglycemia . History Obtained From:  Patient/ EMR       HISTORY OF PRESENT ILLNESS:                The patient is a 78 y.o. male with significant past medical history of DM. HPLD. Smoker ,  Anemia , unstable gait, CKD. Had colon and bladder surgeries, H/O prostate cancer/ proctectomy   was found to be unresponsive at [address] possibly due to Hypoglycemia /  the patient seemed to be still confused even today . I was  consulted for better control of blood glucose. ROS:   Pt's ROS done in detail. Abnormal ROS are noted in Medical and Surgical History Section below: Other Medical History:        Diagnosis Date    Acute urinary tract infection 3/15/2012    Ataxia 2010    Diabetes mellitus ([facility] [address] 2011    type 2, controlled    Fusion of spine of cervical region 10/2012    Gait disturbance 2011    History of prostate cancer 1996    adenocarcinoma    History of tobacco use 1959    Hyperlipidemia 2011    Osteoarthritis 2011     Surgical History:        Procedure Laterality Date    BLADDER SURGERY      BLADDER SURGERY Left 03/17/2022    CYSTOSCOPY LEFT STENT EXCHANGE performed by Demetrice Lackey MD at [address] Left 09/07/2022    LEFT CYSTOSCOPY URETERAL STENT EXCHANGE AND [address] performed by Shraddha Lemus MD at [facility]  03/28/2013    Cysto with removal of artificial sphinter and placement of suprapubic catheter. PROSTATE SURGERY      PROSTATECTOMY  1996    infection - had cancer       Allergies:  Patient has no known allergies.     Family History:       Problem Relation Age of Onset    Cancer Mother     Diabetes Father     Heart Disease Father High Blood Pressure Father     Diabetes Sister     High Blood Pressure Sister     Cancer Brother         prostate, breast    Diabetes Brother     Cancer Maternal Uncle     Diabetes Maternal Grandmother     Stroke Maternal Grandfather      REVIEW OF SYSTEMS:  Review of System Done as noted above     PHYSICAL EXAM:      Vitals:    BP (!) 107/56   Pulse 55   Temp 98.2 °F (36.8 °C) (Oral)   Resp 21   Ht 5' 8\" (1.727 m)   Wt 200 lb 9.9 oz (91 kg)   SpO2 100%   BMI 30.50 kg/m²     CONSTITUTIONAL:  awake, alert, cooperative, but confused appears stated age   EYES:  vision intact Fundoscopic Exam not performed   ENT:Normal  NECK:  Supple, No JVD. Thyroid Exam:Normal   LUNGS:  Has Vesicular Breath Sounds,   CARDIOVASCULAR:  Normal apical impulse, regular rate and rhythm, normal S1 and S2, no S3 or S4, and has no  murmur   ABDOMEN:  No scars, normal bowel sounds, soft, non-distended, non-tender, no masses palpated, no hepatolienomegaly  Musculoskeletal: Normal  Extremities: Normal, peripheral pulses normal, , has no edema   NEUROLOGIC:  Awake, alert, oriented to name, place and time. Confused Cranial nerves II-XII are grossly intact. Motor is  intact. Sensory is intact.  ,  and gait is abnormal and unstable     DATA:    CBC:   Recent Labs     11/02/22  0435 11/03/22  0936   WBC 9.0 8.9   HGB 7.6* 6.9*    209    CMP:  Recent Labs     11/02/22  0435 11/03/22  0936    141   K 4.6 3.9    109   CO2 21 23   BUN 33* 34*   CREATININE 1.9* 1.8*   CALCIUM 9.1 8.8     Lipids: No results found for: CHOL, HDL, TRIG  Glucose:   Recent Labs     11/03/22  0453 11/03/22  0551 11/03/22  1013   POCGLU 119* 269* 219*     Hemoglobin A1C:   Lab Results   Component Value Date/Time    LABA1C 6.7 11/01/2022 10:14 AM     Free T4:   Lab Results   Component Value Date/Time    T4FREE 1.48 11/03/2022 09:36 AM     Free T3: No results found for: FT3  TSH High Sensitivity:   Lab Results   Component Value Date/Time    TSHHS 5.100 11/02/2022 04:35 AM       CTA HEAD NECK W CONTRAST   Final Result   Stable CTA head/neck compared to prior. 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 Moderate stenosis of the right V1 cervical vertebral artery origin, unchanged. Cta head:      Focal severe stenosis of a left M2 branch MCA, right P2/P3 PCA, and left V4   vertebral artery, unchanged. Moderate to severe stenosis right supraclinoid   ICA, unchanged. Other areas of left severe stenosis discussed above,   unchanged. CT HEAD WO CONTRAST   Final Result   Chronic findings in the brain without acute CT abnormality identified. CT CHEST ABDOMEN PELVIS W CONTRAST Additional Contrast? None   Final Result      Negative for pneumonia. Mild bibasilar opacities which are most likely   atelectasis. Small fixed hiatus hernia. Abdomen/Pelvis:      Liver: Liver is normal density. No enhancing masses. Normal enhancement of   the intrahepatic vasculature. Spleen:  Normal size. No enhancing masses calcified granulomata in the   spleen. Pancreas: No enhancing masses. No ductal dilation. No adjacent fatty   stranding. Gallbladder no calcified stones or sludge. No pericholecystic fluid. No   wall thickening. Bile ducts: No biliary ductal dilation      Adrenals: The adrenal glands are unremarkable      Kidneys: Urinary tract stent on the left. The left kidney is mildly   atrophic. Perinephric stranding on the left and mild decreased enhancement   of the cortex when compared to the right. However, no hydronephrosis. No   large mass. Heterogeneity of the contrast enhancement on the left. Left   urinary tract stent extends into the bladder. No stones along the pathway of   the left ureter. GI: No small bowel dilation. No colonic wall thickening. No large mass. The stomach is unremarkable in appearance but it is underdistended.   Moderate amount of stool throughout the colon. Mesentery: No enlarged lymphadenopathy. No free fluid. No free gas. Aorta: Aorta is of normal size. Negative for dissection. IVC is   unremarkable. Celiac axis and SMA are patent. Portal vein is patent. Atherosclerotic change of the aorta in the renal arteries. PELVIS      GI: No small bowel dilation. No colonic wall thickening. No enlarged   lymphadenopathy. Small amount of free fluid in the pelvis. : The bladder is unremarkable in appearance. No large mass. Suprapubic   catheter. The bladder is completely decompressed. Prostate appears to have   been resected. Phleboliths in the pelvis. Multiple clips in the pelvis. No   enlarged lymphadenopathy. Osseous: Bone island in the right ilium. No lytic destructive lesions. Bone   island in the right acetabulum also unchanged. IMPRESSION:   1. Left urinary tract stent is in place. Probable pyelonephritis of the left   kidney. CT HEAD WO CONTRAST   Final Result   No acute intracranial abnormality. XR CHEST PORTABLE   Final Result   1. No active pulmonary disease.          MRI BRAIN WO CONTRAST    (Results Pending)          Scheduled Medicines   Medications:    insulin lispro  0-4 Units SubCUTAneous TID     heparin (porcine)  5,000 Units SubCUTAneous 3 times per day    atorvastatin  80 mg Oral Nightly    amLODIPine  5 mg Oral Daily    insulin lispro  0-4 Units SubCUTAneous 2 times per day    pregabalin  50 mg Oral BID    pantoprazole  40 mg Oral QAM AC    sodium chloride flush  5-40 mL IntraVENous 2 times per day    dextrose bolus  250 mL IntraVENous Once    carvedilol  6.25 mg Oral BID     ferrous sulfate  325 mg Oral Daily with breakfast    torsemide  20 mg Oral Once per day on Mon Wed Fri    levothyroxine  75 mcg Oral Daily    aspirin  81 mg Oral Nightly      Infusions:    sodium chloride 100 mL/hr at 11/03/22 1006    sodium chloride      sodium chloride 75 mL/hr at 11/01/22 2046    dextrose           IMPRESSION    Patient Active Problem List   Diagnosis    Gait disturbance    Generalized weakness    Diabetes mellitus (HCC)    Osteoarthritis    Anemia of chronic disorder    Infected implanted bladder sphincter cuff    Escherichia coli urinary tract infection    Hypertension    Sepsis ([address]    Acute encephalopathy    Type 2 diabetes mellitus with hypoglycemia without coma (HCC)    UTI (urinary tract infection) due to urinary indwelling catheter ([facility])    Hyperkalemia    Acute kidney failure (HCC)    Leg weakness, bilateral    Type 2 diabetes mellitus with neurological manifestations, uncontrolled    Complicated UTI (urinary tract infection)    Essential hypertension    Severe muscle deconditioning    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 ([facility])    Frequent falls    Chronic kidney disease, stage 3a ([address]    Uncontrolled type 2 diabetes mellitus with peripheral neuropathy    Prostate cancer ([address]    Suprapubic catheter ([address]    Sepsis due to urinary tract infection ([address]    Coagulase negative Staphylococcus bacteremia    Chronic kidney disease, stage IV (severe) (HCC)    Acute metabolic encephalopathy    SVT (supraventricular tachycardia) (HCC)    Metabolic encephalopathy    History of prostate cancer    Cigarette nicotine dependence without complication    Altered mental status    Serratia marcescens infection    Hypoglycemia    Hospital discharge follow-up    Fungemia    Pyelonephritis    Bacteremia due to Enterococcus         RECOMMENDATIONS:      Reviewed POC blood glucose . Labs and X ray results   Reviewed Home and Current Medicines   Will Start On Correction bolus Humalog Insulin  Monitor Blood glucose frequently   Modify  the dose of Insulin as needed        Will follow with you  Again thank you for sharing pt's care with me.      Truly yours,       Ben Barboza MD

## 2022-11-03 NOTE — CONSULTS
Neurology Service Consult Note  [address]   Patient Name: Claudeen Bare  : 1940        Subjective:   Reason for consult: Altered Mental Status  78 y. o. with history of ataxia, DM II, hx prostate cancer, HLD, male presenting to [place] [address] 10/24 from [facility] with altered mental status. Patient was noted to by hypoglycemic (50, 35). Patient had just completed outpatient treatment for UTI with IV antibiotics at time of admission. He has been followed closely by ID, urology in addition to IM for management of bacteremia and hydronephrosis. Initially patients mental status had improved however on  it was noted that patient had worsening mental status. Home lyrica was restarted after 9 days. Early this morning, rapid response was called for hypoglycemia and hypotension. He was noted to have decreased responsiveness in addition to left upper extremity weakness. Stroke alert was initiated and patient was evaluated by [facility] stroke team. CT was non acute however CTA did note several areas of right sided high grade stenosis. [facility] added Plavix to asa x 30 days then so single agent aspirin, SBP goal 140-180. Chart was reviewed in detail. Patient has had evidence for significant intracranial stenosis on prior imaging studies and was noted to have left sided weakness during hospitalization in 2021. Patient was seen and assessed. He is resting in bed with eyes closed. He opens his eyes to voice but has difficulty remaining awake. Son is sitting at bedside. He tells me that the patient has had a much more difficult time \"bouncing back\" as he used to. He also shares that the patient has had some hallucinations and has been talking to his  grandson in the room. Patient states to me during the exam that his grandson was sleeping on the couch in his room last night. Patient is oriented to self, month, year. Speech is soft/weak and somewhat slurred.  With persistent stimulation he is able to wake enough to complete exam and does not have any noted weakness or sensory change. Vision is intact. Past Medical History:   Diagnosis Date    Acute urinary tract infection 3/15/2012    Ataxia 2010    Diabetes mellitus ([address] 2011    type 2, controlled    Fusion of spine of cervical region 10/2012    Gait disturbance 2011    History of prostate cancer 1996    adenocarcinoma    History of tobacco use 1959    Hyperlipidemia 2011    Osteoarthritis 2011    :   Past Surgical History:   Procedure Laterality Date    BLADDER SURGERY      BLADDER SURGERY Left 03/17/2022    CYSTOSCOPY LEFT STENT EXCHANGE performed by Herman Wiggins MD at [address] [facility] Left 09/07/2022    LEFT CYSTOSCOPY URETERAL STENT EXCHANGE AND [address] performed by Lorilee Heimlich, MD at [address] [facility]  03/28/2013    Cysto with removal of artificial sphinter and placement of suprapubic catheter.     PROSTATE SURGERY      PROSTATECTOMY  1996    infection - had cancer     Medications:  Scheduled Meds:   [Held by provider] insulin lispro  0-4 Units SubCUTAneous TID WC    [Held by provider] insulin lispro  0-4 Units SubCUTAneous Nightly    heparin (porcine)  5,000 Units SubCUTAneous 3 times per day    atorvastatin  80 mg Oral Nightly    amLODIPine  5 mg Oral Daily    pregabalin  50 mg Oral BID    pantoprazole  40 mg Oral QAM AC    sodium chloride flush  5-40 mL IntraVENous 2 times per day    dextrose bolus  250 mL IntraVENous Once    carvedilol  6.25 mg Oral BID WC    ferrous sulfate  325 mg Oral Daily with breakfast    torsemide  20 mg Oral Once per day on Mon Wed Fri    levothyroxine  75 mcg Oral Daily    aspirin  81 mg Oral Nightly     Continuous Infusions:   sodium chloride      sodium chloride 75 mL/hr at 11/01/22 2046    dextrose       PRN Meds:.labetalol, hydrALAZINE, dextrose bolus **OR** dextrose bolus, sodium chloride flush, sodium chloride, ondansetron **OR** ondansetron, polyethylene glycol, acetaminophen **OR** acetaminophen, glucose, dextrose bolus **OR** dextrose bolus, glucagon (rDNA), dextrose    No Known Allergies  Social History     Socioeconomic History    Marital status:      Spouse name: Not on file    Number of children: 3    Years of education: Not on file    Highest education level: Not on file   Occupational History    Not on file   Tobacco Use    Smoking status: Former     Packs/day: 0.50     Types: Cigarettes     Quit date: 1976     Years since quittin.3    Smokeless tobacco: Never   Vaping Use    Vaping Use: Not on file   Substance and Sexual Activity    Alcohol use: No     Comment: Quit in     Drug use: No    Sexual activity: Not on file   Other Topics Concern    Not on file   Social History Narrative    Not on file     Social Determinants of Health     Financial Resource Strain: Not on file   Food Insecurity: Not on file   Transportation Needs: Not on file   Physical Activity: Not on file   Stress: Not on file   Social Connections: Not on file   Intimate Partner Violence: Not on file   Housing Stability: Not on file      Family History   Problem Relation Age of Onset    Cancer Mother     Diabetes Father     Heart Disease Father     High Blood Pressure Father     Diabetes Sister     High Blood Pressure Sister     Cancer Brother         prostate, breast    Diabetes Brother     Cancer Maternal Uncle     Diabetes Maternal Grandmother     Stroke Maternal Grandfather          ROS (10 systems)  In addition to that documented in the HPI above, the additional ROS was obtained:  Constitutional: Denies fevers or chills  Eyes: Denies vision changes  ENMT: Denies sore throat  CV: Denies chest pain  Resp: Denies SOB  GI: Denies vomiting or diarrhea  : Denies painful urination  MSK: Denies recent trauma  Skin: Denies new rashes  Neuro: Denies new numbness or tingling or weakness  Endocrine: Denies unexpected weight loss  Heme: Denies bleeding disorders    Physical Exam:       Wt Readings from Last 3 Encounters:   11/01/22 200 lb 9.9 oz (91 kg)   09/20/22 206 lb 9.1 oz (93.7 kg)   08/09/22 197 lb 12.8 oz (89.7 kg)     Temp Readings from Last 3 Encounters:   11/03/22 98 °F (36.7 °C)   10/05/22 97 °F (36.1 °C) (Infrared)   09/21/22 98.2 °F (36.8 °C)     BP Readings from Last 3 Encounters:   11/03/22 (!) 195/91   10/05/22 118/61   09/21/22 (!) 143/74     Pulse Readings from Last 3 Encounters:   11/03/22 70   10/05/22 59   09/21/22 69        Gen: A&O x 2-3, NAD, cooperative, drowsy  HEENT: NC/AT, EOMI, PERRL, mmm,  neck supple, no meningeal signs; Heart: SB on monitor  Lungs: Respirations even and  unlabored  Ext: no edema, no calf tenderness b/l  Psych: normal mood and affect, drowsy, hallucinations  Skin: no rashes or lesions    NEUROLOGIC EXAM:    Mental Status: A&O to self, month and year, NAD, speech clear/slurred and voice soft, language fluent, repetition and naming intact, follows commands appropriately    Cranial Nerve Exam:   CN II-XII:  PERRL, VFF, no nystagmus, no gaze paresis, sensation V1-V3 intact b/l, muscles of facial expression symmetric; hearing intact to conversational tone, palate elevates symmetrically, shoulder elevation symmetric and tongue protrudes midline with movement side to side.     Motor Exam:       Strength 5/5 UE's/LE's b/l  Tone and bulk normal   No pronator drift    Deep Tendon Reflexes: 2/4 biceps, triceps, brachioradialis, patellar, and achilles b/l; flexor plantar responses b/l    Sensation: Intact light touch/pinprick/vibration UE's/LE's b/l    Coordination/Cerebellum:       Tremors--none      Rapidly alternating movements: no dysdiadochokinesia b/l                Heel-to-Shin: deferred      Finger-to-Nose: no dysmetria b/l    Gait and stance:      Gait: deferred      LABS:     Recent Labs     11/02/22  0435   WBC 9.0      K 4.6      CO2 21   BUN 33*   CREATININE 1.9*   GLUCOSE 142*       IMAGING:    CT head w/o contrast:  Impression   Chronic findings in the brain without acute CT abnormality identified. CTA head and neck:  Impression   Stable CTA head/neck compared to prior. 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 Moderate stenosis of the right V1 cervical vertebral artery origin, unchanged. Cta head:       Focal severe stenosis of a left M2 branch MCA, right P2/P3 PCA, and left V4   vertebral artery, unchanged. Moderate to severe stenosis right supraclinoid   ICA, unchanged. Other areas of left severe stenosis discussed above,   unchanged. MRI brain w/o contrast:  Pending    All imaging was discussed with attending neurologist Dr. Dieudonne Hernandez    ASSESSMENT/PLAN:   78year old male presenting with hypoglycemia and altered mental status. Patient  had repeat episode of hypoglycemia and HTN overnight. Today he is drowsy but wakes easily and is oriented to self, month, year. He is having some hallucination, seeing his  grandson in the room frequently. Speech is slurred, mostly due to drowsiness and voice is soft but language is fluent. Strength and sensation are intact in the upper and lower extremities. There was not evidence for left sided weakness as noted during rapid response early this morning. Altered mental status due to toxic metabolic encephalopathy superimposed on HTN, hypoglycemia, anemia, UTI, bacteremia and likely delirium.    Neuro imaging as above - noting chronic stenosis of the left MCA, right PCA left vertebral artery   MRI brain w/o contrast pending to rule out any acute stroke   Continue Plavix and aspirin for 30 days then monotherapy with aspirin alone per [facility] recs  SBP goals 140-180 per [facility] recs  BUN 33/Creatinine 1.9, TSH 5.1, Hgb 6.9 - management per IM  Blood cultures 11/1 NG at 48 hours  Delirium precautions - Promote being awake during the day time hours with lights on, blinds open. Reorient as needed. Minimize waking patient from sleep during night time  hours as able. PT/OT as recommended  Will continue to follow pending imaging results and neurologic improvement. > 65 minutes of time spent included chart review, obtaining history, patient examination, developing plan of care, and documentation. Patient was discussed with attending neurologist Dr. Brumfield . Thank you for allowing us to participate in the care of your patient. If there are any questions regarding evaluation please feel free to contact us. VIVIANA Carbajal CNP, 11/3/2022       ------------------------------------    Attending Note:  I have rounded on this patient with Tripp Grady CNP. I have reviewed the chart and we have discussed this case in detail. The patient was seen and examined by myself. Pertinent labs and imaging have been personally reviewed. Our findings and impressions were discussed with the patient. I concur with the Nurse Practioner's assessment and plan. Recommend continued Plavix and aspirin for intracranial stenosis per recommendations from [facility]. His MRI of the brain did not reveal any new areas of stroke revealing a chronic lacunar infarct in the right external capsule. Suspect his presentation is more manifestation of a multifactorial metabolic encephalopathy in the setting of UTI, hypoglycemia, anemia, acute kidney injury, and hypothyroidism. We will continue to correct metabolic abnormalities. Discussed with his son at the bedside he states he has been exhibiting delirious behavior endorsing visual hallucinations and delusional thoughts. Further recommendations are as detailed above. We will continue to follow.     Kenyon Quick DO 11/3/2022 10:16 PM

## 2022-11-03 NOTE — PROGRESS NOTES
V2.0  [facility] [place]      Name:  Sophia Russ /Age/Sex: 1940  (78 y.o. male)   MRN & CSN:  [id number] & [id number] Encounter Date/Time: 2022 8:07 AM EDT    Location:  [facility]-A PCP: Orin Valdes MD       Hospital Day: 10    Assessment and Plan:   Sophia Russ is a 78 y.o. male with pmh of recent Candida UTI and Candida fungemia who presents with hypoglycemia    -received a message from physical therapy that confusion is worse  -He was apparently on Lyrica at home. The dose was 50 mg twice daily. He is somewhat agitated today. We will restart Lyrica. He has been without it for about 9 days.  -We will consult neurology. Plan:  Acute severe and refractory hypoglycemia, can be secondary to decreased p.o. intake and with Lantus use. No more episodes of hypoglycemia in last 24 hours. Acute metabolic encephalopathy S/T severe and refractory hypoglycemia, Resolved. CT chest, abdomen, pelvis ordered showed probable pyelonephritis of L kidney, was initially started on meropenem but urine cultures were negative so was discontinued. Cystoscopy/left ureteral stent exchange as outpatient     3. Enterococcus raffinosus bacteremia: 1 out of 4 bottles were positive for it, will start patient on ampicillin and consult ID. Repeat blood cultures are negative     4. Elevated troponin  trop T 0.044, chronically elevated in setting of CKD, EKG with sinus bradycardia, no acute ST T wave changes     5. Chronic anemia - Hgb 8.0, near baseline   6. CKDIII Cr 2.0, near baseline avoid nephrotoxins       Diet ADULT DIET;  Regular; 5 carb choices (75 gm/meal)  ADULT ORAL NUTRITION SUPPLEMENT; Lunch, Dinner; Diabetic Oral Supplement   DVT Prophylaxis [x] Lovenox, []  Heparin, [] SCDs, [] Ambulation,  [] Eliquis, [] Xarelto  [] Coumadin   Code Status Full Code   Disposition From: SNF  Expected Disposition: SNF  Estimated Date of Discharge: in 1-2 days  Patient requires continued admission due to worsening confusion   Surrogate Decision Maker/ POA Greg his Child listed as alternate contact in chart     Subjective:     Chief Complaint: Hypoglycemia (39 per ems upon arrival. Glucagon given at nursing home and D10 given per medics)       Silva Freire is a 78 y.o. male     When I saw him he said that his son is coming to pick him up and take him home. I did speak with his son. That was not the case. Review of Systems:    Review of Systems    No bleeding or vomiting reported. ED triage note:      Patient to the ED from the [facility] via EMS. Patient was found to have altered mental status this morning with a BG of 50 per the nursing home. Nursing staff gave patient glucagon. When EMS arrived patient BG was 34 per EMS. Ems started D10 and got BG to 84 PTA. Patient BG 83 upon arrival. Patient is alert and oriented x4 upon arrival. VS stable. ED provider diagnosis:      Clinical Impression:  1. Hypoglycemia    2. Hypothermia, initial encounter        Was in the ICU from 10/24 until 10/28 - was on D10 at 100 mL per hour for about 36 hours  Was on 3 N 10/28 until 10/31    Came to 1 N 10/31    Per ID note 10/30:  Await repeat BC re-ordered on 10/31, if negative, will DC ampicillin      BC:    ENTEROCOCCUS RAFFINOSUS POSITIVE for Isolated one of two sets Abnormal             Objective: Intake/Output Summary (Last 24 hours) at 11/2/2022 2118  Last data filed at 11/2/2022 7675  Gross per 24 hour   Intake --   Output 1825 ml   Net -1825 ml          Vitals:   Vitals:    11/02/22 2030   BP: (!) 187/63   Pulse: 73   Resp: 16   Temp: 98.9 °F (37.2 °C)   SpO2: 100%       Physical Exam:     Physical Exam  Constitutional:       Appearance: He is not diaphoretic. Pulmonary:      Effort: Pulmonary effort is normal.   Neurological:      Cranial Nerves: No cranial nerve deficit.    He is confused today      Medications:   Medications:    pregabalin  50 mg Oral BID    insulin lispro 0-16 Units SubCUTAneous Q4H    insulin lispro  0-4 Units SubCUTAneous Nightly    pantoprazole  40 mg Oral QAM AC    sodium chloride flush  5-40 mL IntraVENous 2 times per day    enoxaparin  40 mg SubCUTAneous Daily    dextrose bolus  250 mL IntraVENous Once    carvedilol  6.25 mg Oral BID WC    ferrous sulfate  325 mg Oral Daily with breakfast    torsemide  20 mg Oral Once per day on Mon Wed Fri    levothyroxine  75 mcg Oral Daily    aspirin  81 mg Oral Nightly      Infusions:    sodium chloride 75 mL/hr at 11/01/22 2046    dextrose       PRN Meds: dextrose bolus, 125 mL, PRN   Or  dextrose bolus, 250 mL, PRN  sodium chloride flush, 5-40 mL, PRN  sodium chloride, , PRN  ondansetron, 4 mg, Q8H PRN   Or  ondansetron, 4 mg, Q6H PRN  polyethylene glycol, 17 g, Daily PRN  acetaminophen, 650 mg, Q6H PRN   Or  acetaminophen, 650 mg, Q6H PRN  glucose, 4 tablet, PRN  dextrose bolus, 125 mL, PRN   Or  dextrose bolus, 250 mL, PRN  glucagon (rDNA), 1 mg, PRN  dextrose, , Continuous PRN      Labs      Recent Results (from the past 24 hour(s))   POCT Glucose    Collection Time: 11/02/22 12:31 AM   Result Value Ref Range    POC Glucose 59 (L) 70 - 99 MG/DL   POCT Glucose    Collection Time: 11/02/22  1:17 AM   Result Value Ref Range    POC Glucose 74 70 - 99 MG/DL   CBC    Collection Time: 11/02/22  4:35 AM   Result Value Ref Range    WBC 9.0 4.0 - 10.5 K/CU MM    RBC 2.53 (L) 4.6 - 6.2 M/CU MM    Hemoglobin 7.6 (L) 13.5 - 18.0 GM/DL    Hematocrit 25.1 (L) 42 - 52 %    MCV 99.2 78 - 100 FL    MCH 30.0 27 - 31 PG    MCHC 30.3 (L) 32.0 - 36.0 %    RDW 16.5 (H) 11.7 - 14.9 %    Platelets 335 758 - 114 K/CU MM    MPV 11.6 (H) 7.5 - 11.1 FL   Basic Metabolic Panel    Collection Time: 11/02/22  4:35 AM   Result Value Ref Range    Sodium 143 135 - 145 MMOL/L    Potassium 4.6 3.5 - 5.1 MMOL/L    Chloride 109 99 - 110 mMol/L    CO2 21 21 - 32 MMOL/L    Anion Gap 13 4 - 16    BUN 33 (H) 6 - 23 MG/DL    Creatinine 1.9 (H) 0.9 - 1.3 MG/DL Est, Glom Filt Rate 34 (L) >60 mL/min/1.73m2    Glucose 142 (H) 70 - 99 MG/DL    Calcium 9.1 8.3 - 10.6 MG/DL   TSH    Collection Time: 11/02/22  4:35 AM   Result Value Ref Range    TSH, High Sensitivity 5.100 (H) 0.270 - 4.20 uIu/ml   Vitamin B12    Collection Time: 11/02/22  4:35 AM   Result Value Ref Range    Vitamin B-12 662.2 211 - 911 pg/ml   Ammonia    Collection Time: 11/02/22  4:35 AM   Result Value Ref Range    Ammonia 21 16 - 60 UMOL/L   POCT Glucose    Collection Time: 11/02/22  4:50 AM   Result Value Ref Range    POC Glucose 136 (H) 70 - 99 MG/DL   POCT Glucose    Collection Time: 11/02/22  6:29 AM   Result Value Ref Range    POC Glucose 135 (H) 70 - 99 MG/DL   POCT Glucose    Collection Time: 11/02/22 11:05 AM   Result Value Ref Range    POC Glucose 230 (H) 70 - 99 MG/DL   POCT Glucose    Collection Time: 11/02/22  4:32 PM   Result Value Ref Range    POC Glucose 272 (H) 70 - 99 MG/DL   POCT Glucose    Collection Time: 11/02/22  7:55 PM   Result Value Ref Range    POC Glucose 254 (H) 70 - 99 MG/DL        Imaging/Diagnostics Last 24 Hours   No results found.     Electronically signed by Petra Ramsay MD on 11/2/2022 at 9:18 PM no fever

## 2022-11-03 NOTE — PROGRESS NOTES
Hospitalist    Had worsening confusion yesterday  -Also had some agitation    Lyrica have been on hold since admission, and it has been restarted. There was some concern for withdrawal.    Has some reported that prior to this admission there was no confusion. Blood sugar was 51 last night. Also had a stroke alert called last night. CTA results noted. Findings on CTA stable compared to prior exam done in April. 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 Will dc back SNF once clear from the neurology standpoint.

## 2022-11-03 NOTE — CONSULTS
Nephrology Service Consultation    Patient:  Ming Michaels  MRN: [id number]  Consulting physician:  James Munguia MD  Reason for Consult: Hypertension uncontrolled    History Obtained From:  patient, electronic medical record  PCP: Lexy Tavarez MD    HISTORY OF PRESENT ILLNESS:   The patient is a 78 y.o. male who presents with weakness confusion presents from nursing home son not present when I saw patient today initially to be unresponsive with hypoglycemia sent to the hospital for evaluation concern for possible CVA. Patient now more stable taking p.o. intake and is now noted to still have the high blood pressure and renal asked to help with blood pressure management and above setting. Patient aware he is little bit disoriented but appears his baseline had some elevated troponin on initial admission with anemia and elevated creatinine to 2.0. Also recently had a UTI and has underlying suprapubic catheter for which is changed every 2 weeks. Patient sees my partner for chronic kidney disease Baseline creatinine 1.7 secondary to most likely diabetes with history of prostate cancer and left-sided hydronephrosis. In the past had a Hopper catheter.   Patient had creatinine up to 2.9 last admission 2.1 this admission but now down to 1.8 improved    Past Medical History:        Diagnosis Date    Acute urinary tract infection 3/15/2012    Ataxia 2010    Diabetes mellitus ([facility] [place] [address] 2011    type 2, controlled    Fusion of spine of cervical region 10/2012    Gait disturbance 2011    History of prostate cancer 1996    adenocarcinoma    History of tobacco use 1959    Hyperlipidemia 2011    Osteoarthritis 2011       Past Surgical History:        Procedure Laterality Date    BLADDER SURGERY      BLADDER SURGERY Left 03/17/2022    CYSTOSCOPY LEFT STENT EXCHANGE performed by Mei Prince MD at [address] [facility] Left 09/07/2022    LEFT CYSTOSCOPY URETERAL [address] PACE CATHETER EXCHANGE performed by Adrian Jacinto MD at [facility]  03/28/2013    Cysto with removal of artificial sphinter and placement of suprapubic catheter. PROSTATE SURGERY      PROSTATECTOMY  1996    infection - had cancer       Medications:   Scheduled Meds:   insulin lispro  0-4 Units SubCUTAneous TID WC    heparin (porcine)  5,000 Units SubCUTAneous 3 times per day    atorvastatin  80 mg Oral Nightly    amLODIPine  5 mg Oral Daily    insulin lispro  0-4 Units SubCUTAneous 2 times per day    pregabalin  50 mg Oral BID    pantoprazole  40 mg Oral QAM AC    sodium chloride flush  5-40 mL IntraVENous 2 times per day    dextrose bolus  250 mL IntraVENous Once    carvedilol  6.25 mg Oral BID WC    ferrous sulfate  325 mg Oral Daily with breakfast    torsemide  20 mg Oral Once per day on Mon Wed Fri    levothyroxine  75 mcg Oral Daily    aspirin  81 mg Oral Nightly     Continuous Infusions:   sodium chloride 100 mL/hr at 11/03/22 1006    sodium chloride      sodium chloride 75 mL/hr at 11/01/22 2046    dextrose       PRN Meds:.labetalol, hydrALAZINE, sodium chloride, dextrose bolus **OR** dextrose bolus, sodium chloride flush, sodium chloride, ondansetron **OR** ondansetron, polyethylene glycol, acetaminophen **OR** acetaminophen, glucose, dextrose bolus **OR** dextrose bolus, glucagon (rDNA), dextrose    Allergies:  Patient has no known allergies. Social History:   TOBACCO:   reports that he quit smoking about 46 years ago. His smoking use included cigarettes. He smoked an average of .5 packs per day. He has never used smokeless tobacco.  ETOH:   reports no history of alcohol use.   OCCUPATION:      Family History:       Problem Relation Age of Onset    Cancer Mother     Diabetes Father     Heart Disease Father     High Blood Pressure Father     Diabetes Sister     High Blood Pressure Sister     Cancer Brother         prostate, breast    Diabetes Brother     Cancer Maternal Uncle     Diabetes Maternal Grandmother     Stroke Maternal Grandfather        REVIEW OF SYSTEMS:  Negative except for weak tired anxious verbal blood pressure. Physical Exam:    I/O: No intake/output data recorded. Vitals: BP (!) 107/56   Pulse 55   Temp 98.2 °F (36.8 °C) (Oral)   Resp 21   Ht 5' 8\" (1.727 m)   Wt 200 lb 9.9 oz (91 kg)   SpO2 100%   BMI 30.50 kg/m²   General appearance: awake weak  HEENT: Head: Normal, normocephalic, atraumatic. Neck: supple, symmetrical, trachea midline  Lungs: diminished breath sounds bilaterally  Heart: S1, S2 normal  Abdomen: abnormal findings:  soft NT  Extremities: edema trace  Neurologic: Mental status: alertness: alert but dementia baseline      CBC:   Recent Labs     11/02/22  0435 11/03/22  0936   WBC 9.0 8.9   HGB 7.6* 6.9*    209     BMP:    Recent Labs     11/02/22 0435 11/03/22  0936    141   K 4.6 3.9    109   CO2 21 23   BUN 33* 34*   CREATININE 1.9* 1.8*   GLUCOSE 142* 237*     Hepatic: No results for input(s): AST, ALT, ALB, BILITOT, ALKPHOS in the last 72 hours. Troponin: No results for input(s): TROPONINI in the last 72 hours. BNP: No results for input(s): BNP in the last 72 hours. Lipids: No results for input(s): CHOL, HDL in the last 72 hours. Invalid input(s): LDLCALCU  ABGs:   Lab Results   Component Value Date/Time    PO2ART 37 10/24/2022 04:15 PM    YPB5OQQ 49.0 10/24/2022 04:15 PM     INR: No results for input(s): INR in the last 72 hours.   Renal Labs  Albumin:    Lab Results   Component Value Date/Time    LABALBU 3.7 10/30/2022 05:05 AM     Calcium:    Lab Results   Component Value Date/Time    CALCIUM 8.8 11/03/2022 09:36 AM     Phosphorus:    Lab Results   Component Value Date/Time    PHOS 3.0 09/19/2022 09:30 AM     U/A:    Lab Results   Component Value Date/Time    NITRU NEGATIVE 10/24/2022 11:00 AM    NITRU NEGATIVE 03/23/2013 02:24 PM    COLORU YELLOW 10/24/2022 11:00 AM    WBCUA 36 10/24/2022 11:00 AM RBCUA 59 10/24/2022 11:00 AM    MUCUS RARE 10/24/2022 11:00 AM    TRICHOMONAS NONE SEEN 10/24/2022 11:00 AM    YEAST FEW 04/06/2022 04:45 PM    BACTERIA NEGATIVE 10/24/2022 11:00 AM    CLARITYU CLEAR 10/24/2022 11:00 AM    SPECGRAV 1.015 10/24/2022 11:00 AM    UROBILINOGEN 0.2 10/24/2022 11:00 AM    BILIRUBINUR NEGATIVE 10/24/2022 11:00 AM    BLOODU MODERATE 10/24/2022 11:00 AM    GLUCOSEU 1,000 03/23/2013 02:24 PM    Erin Sabina NEGATIVE 10/24/2022 11:00 AM    AMORPHOUS RARE 11/16/2021 12:28 PM     ABG:    Lab Results   Component Value Date/Time    IYU6XFM 49.0 10/24/2022 04:15 PM    PO2ART 37 10/24/2022 04:15 PM    QOR4SBT 25.2 10/24/2022 04:15 PM     HgBA1c:    Lab Results   Component Value Date/Time    LABA1C 6.7 11/01/2022 10:14 AM     Microalbumen/Creatinine ratio:  No components found for: RUCREAT  TSH:  No results found for: TSH  IRON:    Lab Results   Component Value Date/Time    IRON 12 09/07/2022 06:34 AM     Iron Saturation:  No components found for: PERCENTFE  TIBC:    Lab Results   Component Value Date/Time    TIBC 171 09/07/2022 06:34 AM     FERRITIN:    Lab Results   Component Value Date/Time    FERRITIN 508 09/07/2022 06:34 AM     RPR:  No results found for: RPR  MITALI:  No results found for: ANATITER, MITALI  24 Hour Urine for Creatinine Clearance:  No components found for: CREAT4, UHRS10, UTV10  -----------------------------------------------------------------      Assessment and Recommendations     Patient Active Problem List   Diagnosis Code    Gait disturbance R26.9    Generalized weakness R53.1    Diabetes mellitus ([facility] [address] E11.9    Osteoarthritis M19.90    Anemia of chronic disorder D63.8    Infected implanted bladder sphincter cuff T83.69XA    Escherichia coli urinary tract infection N39.0, B96.20    Hypertension I10    Sepsis ([facility] [address] A41.9    Acute encephalopathy G93.40    Type 2 diabetes mellitus with hypoglycemia without coma ([facility] [address] E11.649    UTI (urinary tract infection) due to urinary indwelling catheter ([facility] [address] T83.511A, N39.0    Hyperkalemia E87.5    Acute kidney failure ([address] N17.9    Leg weakness, bilateral R29.898    Type 2 diabetes mellitus with neurological manifestations, uncontrolled [id number]    Complicated UTI (urinary tract infection) N39.0    Essential hypertension I10    Severe muscle deconditioning R29.898    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 (HCC) E11.69, E78.5    Frequent falls R29.6    Chronic kidney disease, stage 3a (HCC) N18.31    Uncontrolled type 2 diabetes mellitus with peripheral neuropathy [id number]    Prostate cancer ([facility] [address] C61    Suprapubic catheter ([facility] [address] Z93.59    Sepsis due to urinary tract infection ([address] A41.9, N39.0    Coagulase negative Staphylococcus bacteremia R78.81, B95.7    Chronic kidney disease, stage IV (severe) ([facility]) F34.4    Acute metabolic encephalopathy V29.77    SVT (supraventricular tachycardia) ([facility]) L53.5    Metabolic encephalopathy M21.26    History of prostate cancer Z85.46    Cigarette nicotine dependence without complication O31.159    Altered mental status R41.82    Serratia marcescens infection A48.8    Hypoglycemia E16.2    Hospital discharge follow-up Z09    Fungemia B49    Pyelonephritis N12    Bacteremia due to Enterococcus R78.81, B95.2     Impression plan  #1 metabolic encephalopathy/dementia  #2 type 2 diabetes with hypoglycemia  #3 stage III chronic kidney disease baseline creatinine 1.6-1.8  #4 anemia  #5 Enterococcus bacteremia  #6 hypertension uncontrolled    Plan  #1 monitor affect underlying dementia from skilled nursing we will monitor with supportive care and and appears close to baseline  #2 monitor glucose control not hypoglycemic  #3 creatinine 1.8 close to baseline supportive care will monitor closely check repeat UA follow-up urine hematuria and proteinuria follow-up urine culture with known suprapubic catheter in prior urinary stents with risk for infection and follow-up urology  #4 agree with getting 1 unit PRBC today  #5 follow-up cultures have been negative seeing infectious disease  #6 blood pressure variable currently not reading well blood pressure remains elevated try to adjust meds to keep systolic blood pressure 1 40-1 60  Will monitor and follow with supportive care likely will do better as an outpatient back in a skilled nursing facility  Electronically signed by Taylor Jalloh MD on 11/3/2022 at 1:31 PM

## 2022-11-03 NOTE — SIGNIFICANT EVENT
Rapid response called for hypoglycemia and obtundation. Glucose 51, recovered to 219 after dextrose bolus, however patient's mentation did not improve for a considerable amount of time. He was also moving all extremities except his LUE spontaneously. There was concern for LUE weakness thus stroke alert called. CT/CTA negative for bleed or LVO. Several areas of high grade stenosis however. [facility] stroke recommending BP control [id number] systolic and secondary stroke prevention.      HDSSI changed to LDSSI  D5 drip stopped due to recovery of sugar to >200  NS initiated for contrast exposure  ASA, statin, carvedilol, norvasc resumed  Labetalol and hydralazine prns ordered      Ghulam España MD    45 minutes of CC time

## 2022-11-03 NOTE — CONSENT
Informed Consent for Blood Component Transfusion Note    I have discussed with the son the rationale for blood component transfusion; its benefits in treating the lethargy; and its risk which includes mild transfusion reactions, rare risk of blood borne infection, or more serious but rare reactions. The son had an opportunity to ask questions and had agreed to proceed with transfusion of blood components.     Electronically signed by Randolph Del Rosario MD on 11/3/22 at 11:31 AM EDT

## 2022-11-03 NOTE — PROGRESS NOTES
Hospitalist    Rapid response called as pt was minimally responsive - when team arrived he responded to painful stimuli. BP very hi - will address      Had been without Lyrica for 9 days - restarted last night.

## 2022-11-03 NOTE — PROGRESS NOTES
Speech Language Pathology  Facility/Department: [facility]   CLINICAL BEDSIDE SWALLOW EVALUATION    NAME: Carlyle Odell  : 1940  MRN: [id number]  ADMISSION DATE: 10/24/2022    Impressions:  Carlyle Odell was seen for a bedside swallow evaluation after being admitted to [facility] with altered mental status and hypoglycemia  Medical hx includes HLD, gait disturbances, diabetes mellitus, prostate cancer, osteoarthritis. Per radiologist report CT reveals \"no intracranial abnormalities. \" No prior hx of dysphagia. Carlyle Odell was seated upright in bed, alert, cooperative, and pleasant. He was noted to be verbose throughout. Oral [facility] exam appears to be [place] [place] with intact labial coordination/ROM, lingual ROM/strength/coordination, and velar elevation. No asymmetry was identified. PO trials of ice chips, thins via cup/straw, solids from his tray were given. Oral stage appears WFL characterized by intact labial closure, bolus mastication/reduction, AP transit, and adequate oral clearance. Pharyngeal stage appears WFL with no s/s of aspiration observed throughout PO trials. Recommendations:  Continue regular solids and thin liquid diet. Results and recommendation d/w pt and RN. No further acute SLP needs identifies. ADMITTING DIAGNOSIS: has Gait disturbance; Generalized weakness; Diabetes mellitus ([address] Osteoarthritis; Anemia of chronic disorder; Infected implanted bladder sphincter cuff; Escherichia coli urinary tract infection; Hypertension; Sepsis ([address] Acute encephalopathy; Type 2 diabetes mellitus with hypoglycemia without coma ([address] UTI (urinary tract infection) due to urinary indwelling catheter ([address] Hyperkalemia; Acute kidney failure ([address] Leg weakness, bilateral; Type 2 diabetes mellitus with neurological manifestations, uncontrolled; Complicated UTI (urinary tract infection);  Essential hypertension; Severe muscle deconditioning; 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 ([address] Frequent falls; Chronic kidney disease, stage 3a ([facility] Uncontrolled type 2 diabetes mellitus with peripheral neuropathy; Prostate cancer ([facility] Suprapubic catheter ([facility] Sepsis due to urinary tract infection ([facility] Coagulase negative Staphylococcus bacteremia; Chronic kidney disease, stage IV (severe) ([facility] Acute metabolic encephalopathy; SVT (supraventricular tachycardia) ([facility] Metabolic encephalopathy; History of prostate cancer; Cigarette nicotine dependence without complication; Altered mental status; Serratia marcescens infection; Hypoglycemia; Hospital discharge follow-up; Fungemia; Pyelonephritis; and Bacteremia due to Enterococcus on their problem list.  ONSET DATE: this admission     Recent Chest Xray/CT of Chest: see chart     Date of Eval: 11/3/2022  Evaluating Therapist: Maya Lua    Current Diet level:  Current Diet : Regular      Primary Complaint       Pain:  Pain Assessment  Pain Assessment: None - Denies Pain  Pain Level: 0    Reason for Referral  Luis Alberto Mims was referred for a bedside swallow evaluation to assess the efficiency of his swallow function, identify signs and symptoms of aspiration and make recommendations regarding safe dietary consistencies, effective compensatory strategies, and safe eating environment. Impression  Dysphagia Diagnosis: Swallow function appears WF  Dysphagia Outcome Severity Scale: Level 6: Within functional limits/Modified independence     Treatment Plan  Requires SLP Intervention: No  Duration of Treatment: n/a          Recommended Diet and Intervention        Compensatory Swallowing Strategies       Treatment/Goals  Short-term Goals  Timeframe for Short-term Goals: n/a    General  Chart Reviewed: Yes  Behavior/Cognition: Alert; Cooperative;Pleasant mood  Respiratory Status: Room air  O2 Device: None (Room air)  Communication Observation: Functional  Follows Directions: Simple  Dentition: Adequate  Patient Positioning: Upright in bed  Baseline Vocal Quality: Normal  Prior Dysphagia History: no           Vision/Hearing  Vision  Vision: Impaired  Vision Exceptions: Wears glasses for distance  Hearing  Hearing: Within functional limits    Oral Motor Deficits  Oral/Motor  Oral Hygiene: Moist    Oral Phase Dysfunction  Oral Phase  Oral Phase: WFL     Indicators of Pharyngeal Phase Dysfunction   Pharyngeal Phase   Pharyngeal Phase: WFL    Prognosis  Individuals consulted  Consulted and agree with results and recommendations: Patient    Education  Patient Education: Recommendations  Patient Education Response: Verbalizes understanding             Therapy Time  SLP Individual Minutes  Time In: 1400  Time Out: 1993  Minutes: 25            Alice Amato  11/3/2022 1:43 PM

## 2022-11-04 ENCOUNTER — APPOINTMENT (OUTPATIENT)
Dept: GENERAL RADIOLOGY | Age: 82
DRG: 637 | End: 2022-11-04
Payer: MEDICARE

## 2022-11-04 PROBLEM — Z09 HOSPITAL DISCHARGE FOLLOW-UP: Status: RESOLVED | Noted: 2022-05-27 | Resolved: 2022-11-04

## 2022-11-04 LAB
ABO/RH: NORMAL
ALBUMIN SERPL-MCNC: 3.2 GM/DL (ref 3.4–5)
ANION GAP SERPL CALCULATED.3IONS-SCNC: 13 MMOL/L (ref 4–16)
ANTIBODY SCREEN: NEGATIVE
BASOPHILS ABSOLUTE: 0 K/CU MM
BASOPHILS RELATIVE PERCENT: 0.6 % (ref 0–1)
BUN BLDV-MCNC: 29 MG/DL (ref 6–23)
CALCIUM SERPL-MCNC: 9 MG/DL (ref 8.3–10.6)
CHLORIDE BLD-SCNC: 106 MMOL/L (ref 99–110)
CO2: 22 MMOL/L (ref 21–32)
COMPONENT: NORMAL
CREAT SERPL-MCNC: 1.8 MG/DL (ref 0.9–1.3)
CROSSMATCH RESULT: NORMAL
DIFFERENTIAL TYPE: ABNORMAL
DIFFERENTIAL TYPE: ABNORMAL
EOSINOPHILS ABSOLUTE: 0.9 K/CU MM
EOSINOPHILS RELATIVE PERCENT: 12.6 % (ref 0–3)
ESTIMATED AVERAGE GLUCOSE: 126 MG/DL
FERRITIN: 508 NG/ML (ref 30–400)
FOLATE: 14.3 NG/ML (ref 3.1–17.5)
GFR SERPL CREATININE-BSD FRML MDRD: 37 ML/MIN/1.73M2
GLUCOSE BLD-MCNC: 138 MG/DL (ref 70–99)
GLUCOSE BLD-MCNC: 178 MG/DL (ref 70–99)
GLUCOSE BLD-MCNC: 186 MG/DL (ref 70–99)
GLUCOSE BLD-MCNC: 205 MG/DL (ref 70–99)
GLUCOSE BLD-MCNC: 257 MG/DL (ref 70–99)
GLUCOSE BLD-MCNC: 54 MG/DL (ref 70–99)
HBA1C MFR BLD: 6 % (ref 4.2–6.3)
HCT VFR BLD CALC: 28.9 % (ref 42–52)
HCT VFR BLD CALC: ABNORMAL % (ref 42–52)
HEMOGLOBIN: 9.1 GM/DL (ref 13.5–18)
HEMOGLOBIN: ABNORMAL GM/DL (ref 13.5–18)
IMMATURE NEUTROPHIL %: 0.3 % (ref 0–0.43)
IRON: 55 UG/DL (ref 59–158)
LYMPHOCYTES ABSOLUTE: 1.7 K/CU MM
LYMPHOCYTES RELATIVE PERCENT: 25.4 % (ref 24–44)
MCH RBC QN AUTO: 30.7 PG (ref 27–31)
MCH RBC QN AUTO: ABNORMAL PG (ref 27–31)
MCHC RBC AUTO-ENTMCNC: 31.5 % (ref 32–36)
MCHC RBC AUTO-ENTMCNC: ABNORMAL % (ref 32–36)
MCV RBC AUTO: 97.6 FL (ref 78–100)
MCV RBC AUTO: ABNORMAL FL (ref 78–100)
MONOCYTES ABSOLUTE: 0.5 K/CU MM
MONOCYTES RELATIVE PERCENT: 7.9 % (ref 0–4)
NUCLEATED RBC %: 0 %
PCT TRANSFERRIN: 23 % (ref 10–44)
PDW BLD-RTO: 16.7 % (ref 11.7–14.9)
PDW BLD-RTO: ABNORMAL % (ref 11.7–14.9)
PHOSPHORUS: 3.5 MG/DL (ref 2.5–4.9)
PLATELET # BLD: 225 K/CU MM (ref 140–440)
PLATELET # BLD: ABNORMAL K/CU MM (ref 140–440)
PMV BLD AUTO: 12.5 FL (ref 7.5–11.1)
PMV BLD AUTO: ABNORMAL FL (ref 7.5–11.1)
POTASSIUM SERPL-SCNC: 4.6 MMOL/L (ref 3.5–5.1)
RBC # BLD: 2.96 M/CU MM (ref 4.6–6.2)
RBC # BLD: ABNORMAL M/CU MM (ref 4.6–6.2)
RETICULOCYTE COUNT PCT: 1.4 % (ref 0.2–2.2)
RETICULOCYTE COUNT PCT: ABNORMAL % (ref 0.2–2.2)
SEGMENTED NEUTROPHILS ABSOLUTE COUNT: 3.6 K/CU MM
SEGMENTED NEUTROPHILS RELATIVE PERCENT: 53.2 % (ref 36–66)
SEGMENTED NEUTROPHILS RELATIVE PERCENT: ABNORMAL % (ref 36–66)
SODIUM BLD-SCNC: 141 MMOL/L (ref 135–145)
STATUS: NORMAL
TOTAL IMMATURE NEUTOROPHIL: 0.02 K/CU MM
TOTAL IRON BINDING CAPACITY: 239 UG/DL (ref 250–450)
TOTAL NUCLEATED RBC: 0 K/CU MM
TRANSFUSION STATUS: NORMAL
UNIT DIVISION: 0
UNIT NUMBER: NORMAL
UNSATURATED IRON BINDING CAPACITY: 184 UG/DL (ref 110–370)
VITAMIN B-12: 624.3 PG/ML (ref 211–911)
WBC # BLD: 6.7 K/CU MM (ref 4–10.5)
WBC # BLD: ABNORMAL K/CU MM (ref 4–10.5)

## 2022-11-04 PROCEDURE — 6370000000 HC RX 637 (ALT 250 FOR IP): Performed by: INTERNAL MEDICINE

## 2022-11-04 PROCEDURE — 80069 RENAL FUNCTION PANEL: CPT

## 2022-11-04 PROCEDURE — 85025 COMPLETE CBC W/AUTO DIFF WBC: CPT

## 2022-11-04 PROCEDURE — 85045 AUTOMATED RETICULOCYTE COUNT: CPT

## 2022-11-04 PROCEDURE — 6360000002 HC RX W HCPCS: Performed by: SURGERY

## 2022-11-04 PROCEDURE — 99222 1ST HOSP IP/OBS MODERATE 55: CPT | Performed by: INTERNAL MEDICINE

## 2022-11-04 PROCEDURE — 99233 SBSQ HOSP IP/OBS HIGH 50: CPT | Performed by: NURSE PRACTITIONER

## 2022-11-04 PROCEDURE — 97530 THERAPEUTIC ACTIVITIES: CPT

## 2022-11-04 PROCEDURE — 36415 COLL VENOUS BLD VENIPUNCTURE: CPT

## 2022-11-04 PROCEDURE — 1200000000 HC SEMI PRIVATE

## 2022-11-04 PROCEDURE — 6370000000 HC RX 637 (ALT 250 FOR IP): Performed by: NURSE PRACTITIONER

## 2022-11-04 PROCEDURE — 6370000000 HC RX 637 (ALT 250 FOR IP): Performed by: PHYSICIAN ASSISTANT

## 2022-11-04 PROCEDURE — 71045 X-RAY EXAM CHEST 1 VIEW: CPT

## 2022-11-04 PROCEDURE — 82962 GLUCOSE BLOOD TEST: CPT

## 2022-11-04 PROCEDURE — 94761 N-INVAS EAR/PLS OXIMETRY MLT: CPT

## 2022-11-04 PROCEDURE — 6360000002 HC RX W HCPCS: Performed by: NURSE PRACTITIONER

## 2022-11-04 PROCEDURE — 97116 GAIT TRAINING THERAPY: CPT

## 2022-11-04 PROCEDURE — 97110 THERAPEUTIC EXERCISES: CPT

## 2022-11-04 PROCEDURE — 6370000000 HC RX 637 (ALT 250 FOR IP): Performed by: SURGERY

## 2022-11-04 PROCEDURE — 2580000003 HC RX 258: Performed by: PHYSICIAN ASSISTANT

## 2022-11-04 RX ORDER — INSULIN LISPRO 100 [IU]/ML
10 INJECTION, SOLUTION INTRAVENOUS; SUBCUTANEOUS
Status: DISCONTINUED | OUTPATIENT
Start: 2022-11-04 | End: 2022-11-05 | Stop reason: HOSPADM

## 2022-11-04 RX ORDER — INSULIN GLARGINE 100 [IU]/ML
15 INJECTION, SOLUTION SUBCUTANEOUS NIGHTLY
Status: DISCONTINUED | OUTPATIENT
Start: 2022-11-04 | End: 2022-11-05 | Stop reason: HOSPADM

## 2022-11-04 RX ORDER — INSULIN LISPRO 100 [IU]/ML
0-8 INJECTION, SOLUTION INTRAVENOUS; SUBCUTANEOUS
Status: DISCONTINUED | OUTPATIENT
Start: 2022-11-04 | End: 2022-11-05 | Stop reason: HOSPADM

## 2022-11-04 RX ADMIN — PANTOPRAZOLE SODIUM 40 MG: 40 TABLET, DELAYED RELEASE ORAL at 06:22

## 2022-11-04 RX ADMIN — PREGABALIN 50 MG: 50 CAPSULE ORAL at 09:55

## 2022-11-04 RX ADMIN — INSULIN LISPRO 10 UNITS: 100 INJECTION, SOLUTION INTRAVENOUS; SUBCUTANEOUS at 11:29

## 2022-11-04 RX ADMIN — AMLODIPINE BESYLATE 5 MG: 5 TABLET ORAL at 19:52

## 2022-11-04 RX ADMIN — FERROUS SULFATE TAB 325 MG (65 MG ELEMENTAL FE) 325 MG: 325 (65 FE) TAB at 09:55

## 2022-11-04 RX ADMIN — SODIUM CHLORIDE, PRESERVATIVE FREE 10 ML: 5 INJECTION INTRAVENOUS at 19:52

## 2022-11-04 RX ADMIN — INSULIN LISPRO 4 UNITS: 100 INJECTION, SOLUTION INTRAVENOUS; SUBCUTANEOUS at 11:29

## 2022-11-04 RX ADMIN — EPOETIN ALFA-EPBX 10000 UNITS: 10000 INJECTION, SOLUTION INTRAVENOUS; SUBCUTANEOUS at 10:00

## 2022-11-04 RX ADMIN — SODIUM CHLORIDE, PRESERVATIVE FREE 10 ML: 5 INJECTION INTRAVENOUS at 09:56

## 2022-11-04 RX ADMIN — HEPARIN SODIUM 5000 UNITS: 5000 INJECTION INTRAVENOUS; SUBCUTANEOUS at 15:22

## 2022-11-04 RX ADMIN — ATORVASTATIN CALCIUM 80 MG: 40 TABLET, FILM COATED ORAL at 19:51

## 2022-11-04 RX ADMIN — HEPARIN SODIUM 5000 UNITS: 5000 INJECTION INTRAVENOUS; SUBCUTANEOUS at 06:43

## 2022-11-04 RX ADMIN — CARVEDILOL 6.25 MG: 6.25 TABLET, FILM COATED ORAL at 17:10

## 2022-11-04 RX ADMIN — LEVOTHYROXINE SODIUM 75 MCG: 0.07 TABLET ORAL at 06:22

## 2022-11-04 RX ADMIN — HEPARIN SODIUM 5000 UNITS: 5000 INJECTION INTRAVENOUS; SUBCUTANEOUS at 22:39

## 2022-11-04 RX ADMIN — CARVEDILOL 6.25 MG: 6.25 TABLET, FILM COATED ORAL at 09:56

## 2022-11-04 RX ADMIN — PREGABALIN 50 MG: 50 CAPSULE ORAL at 19:52

## 2022-11-04 RX ADMIN — AMLODIPINE BESYLATE 5 MG: 5 TABLET ORAL at 09:56

## 2022-11-04 RX ADMIN — ASPIRIN 81 MG CHEWABLE TABLET 81 MG: 81 TABLET CHEWABLE at 19:52

## 2022-11-04 ASSESSMENT — PAIN SCALES - GENERAL: PAINLEVEL_OUTOF10: 0

## 2022-11-04 NOTE — CONSULTS
HEMATOLOGY ONCOLOGY  Consultation Report    REASON FOR CONSULT    Anemia    CHIEF COMPLAINT    Chief Complaint   Patient presents with    Hypoglycemia     39 per ems upon arrival. Glucagon given at nursing home and D10 given per medics       HISTORY OF PRESENT ILLNESS   Herminia George is a 78 y.o. male who was found to have AMS. Noted to have hypoglycemia at [facility] and was treated with glucogon and D10. On review of labs he is noted to have anemia with RBC 7.4, RBC 2.57, hemoglobin 7.9, hematocrit 25.8, .4, platelets 344. Patient noted to have decrease in hemoglobin to 6.9 on 11/3/2022 and is status post 1 unit packed red blood cells. Review of CBC today with RBC 2.96, hemoglobin 9.1, hematocrit 28.9, MCV 97.6, platelets 616. Review of iron studies on November 3, 2022 with ferritin 508, iron 55, TIBC 239, transferrin percent 23. Last hospitalized August 28, 2022 and discharged 9/21/2022. Hospitalized with complicated UTI. Last PRBC infusion 9/12/22. Noted to have hematuria on last admission with cystoscopy and exhange of stent. Due to anemia we were called to evaluate.      PAST MEDICAL HISTORY    Past Medical History:   Diagnosis Date    Acute urinary tract infection 3/15/2012    Ataxia 2010    Diabetes mellitus ([address] 2011    type 2, controlled    Fusion of spine of cervical region 10/2012    Gait disturbance 2011    History of prostate cancer 1996    adenocarcinoma    History of tobacco use 1959    Hyperlipidemia 2011    Osteoarthritis 2011       SURGICAL HISTORY    Past Surgical History:   Procedure Laterality Date    BLADDER SURGERY      BLADDER SURGERY Left 03/17/2022    CYSTOSCOPY LEFT STENT EXCHANGE performed by Mathew Vasquez MD at [address] [facility] Left 09/07/2022    LEFT CYSTOSCOPY URETERAL STENT EXCHANGE AND [address] performed by Caffie Barthel, MD at [address] [facility]  03/28/2013    Cysto with removal of artificial sphinter and placement of suprapubic catheter. PROSTATE SURGERY      PROSTATECTOMY      infection - had cancer       FAMILY HISTORY    Family History   Problem Relation Age of Onset    Cancer Mother     Diabetes Father     Heart Disease Father     High Blood Pressure Father     Diabetes Sister     High Blood Pressure Sister     Cancer Brother         prostate, breast    Diabetes Brother     Cancer Maternal Uncle     Diabetes Maternal Grandmother     Stroke Maternal Grandfather        SOCIAL HISTORY    Social History     Socioeconomic History    Marital status:      Spouse name: None    Number of children: 3    Years of education: None    Highest education level: None   Tobacco Use    Smoking status: Former     Packs/day: 0.50     Types: Cigarettes     Quit date: 1976     Years since quittin.3    Smokeless tobacco: Never   Substance and Sexual Activity    Alcohol use: No     Comment: Quit in     Drug use: No       REVIEW OF SYSTEMS    Constitutional:  Denies fever, chills, loss of appetite, weight loss, tiredness, fatigue or weakness   HEENT:  Denies swelling of neck glands  Respiratory:  Denies cough, shortness of breath or hemoptysis  Cardiovascular:  Denies chest pain, palpitations or swelling   GI:  Denies abdominal pain, nausea, vomiting, constipation or diarrhea   Musculoskeletal:  Denies back pain   Skin:  Denies rash   Neurologic:  Denies headache, focal weakness or sensory changes   All systems negative except as marked.      PHYSICAL EXAM    Vitals: BP (!) 149/117   Pulse 63   Temp 97.4 °F (36.3 °C) (Oral)   Resp 15   Ht 5' 8\" (1.727 m)   Wt 200 lb 9.9 oz (91 kg)   SpO2 100%   BMI 30.50 kg/m²   CONSTITUTIONAL: drowsy cooperative, no apparent distress   EYES: pupils equal, round sclera clear and conjunctiva normal  ENT: Normocephalic, without obvious abnormality, atraumatic  NECK: supple, symmetrical  HEMATOLOGIC/LYMPHATIC: no cervical, supraclavicular or axillary lymphadenopathy   LUNGS:  no increased work of breathing and clear to auscultation   CARDIOVASCULAR: regular rate and rhythm, normal S1 and S2, no murmur noted  ABDOMEN: normal bowel sounds x 4, soft, non-distended, non-tender, no masses palpated, no hepatosplenomgaly   MUSCULOSKELETAL: full range of motion noted, tone is normal  NEUROLOGIC:  drowsy    SKIN: Normal skin color, texture, turgor and no jaundice. Skin appears intact   EXTREMITIES: no LE edema    LABORATORY RESULTS  CBC:   Recent Labs     11/02/22  0435 11/03/22  0936 11/03/22  1314 11/03/22  2157   WBC 9.0 8.9 UNABLE TO PERFORM TESTING: SPECIMEN CLOTTED  --    HGB 7.6* 6.9* UNABLE TO PERFORM TESTING: SPECIMEN CLOTTED 8.4*    209 UNABLE TO PERFORM TESTING: SPECIMEN CLOTTED  --      BMP:    Recent Labs     11/02/22  0435 11/03/22  0936 11/04/22  0510    141 141   K 4.6 3.9 4.6    109 106   CO2 21 23 22   BUN 33* 34* 29*   CREATININE 1.9* 1.8* 1.8*   GLUCOSE 142* 237* 186*     ASSESSMENT/RECOMMENDATION    Anemia: review of iron studies reviewed. Anemia likely secondary infection, kidney disease and use of ASA and plavix. No overt bleeding. WBC and platelets are WNL, would defer BMB at this time. Plan for one dose retacrit today. Continue to monitor CBC. AMS- secondary to hypoglycemia, UTI. Per neurology continue ASA/Plavix    ? pyelonephritis- plan for cystoscopy/left ureteral stent as outpatient    I have independently evaluated and examined this patient today. I have reviewed radiologic and biochemical tests on this patient. Management Plan is developed mutually with Lilliana Campbell NP. I have reviewed above note and agree with assessment and plan    We will follow the patient. Thank you for allowing me to participate in the care of this very pleasant patient.

## 2022-11-04 NOTE — PROGRESS NOTES
Nephrology Progress Note  11/4/2022 1:02 PM  Subjective: Interval History: Alea Pineda is a 78 y.o. male with weak slightly confused resting in bed        Data:   Scheduled Meds:   insulin lispro  0-8 Units SubCUTAneous TID WC    insulin lispro  0-8 Units SubCUTAneous 2 times per day    insulin lispro  10 Units SubCUTAneous TID     insulin glargine  15 Units SubCUTAneous Nightly    heparin (porcine)  5,000 Units SubCUTAneous 3 times per day    atorvastatin  80 mg Oral Nightly    amLODIPine  5 mg Oral BID    pregabalin  50 mg Oral BID    pantoprazole  40 mg Oral QAM AC    sodium chloride flush  5-40 mL IntraVENous 2 times per day    dextrose bolus  250 mL IntraVENous Once    carvedilol  6.25 mg Oral BID WC    ferrous sulfate  325 mg Oral Daily with breakfast    levothyroxine  75 mcg Oral Daily    aspirin  81 mg Oral Nightly     Continuous Infusions:   sodium chloride      sodium chloride 75 mL/hr at 11/01/22 2046    dextrose           CBC   Recent Labs     11/03/22  0936 11/03/22  1314 11/03/22  2157 11/04/22  0510   WBC 8.9 UNABLE TO PERFORM TESTING: SPECIMEN CLOTTED  --  6.7   HGB 6.9* UNABLE TO PERFORM TESTING: SPECIMEN CLOTTED 8.4* 9.1*   HCT 21.8* UNABLE TO PERFORM TESTING: SPECIMEN CLOTTED 26.1* 28.9*    UNABLE TO PERFORM TESTING: SPECIMEN CLOTTED  --  225      BMP   Recent Labs     11/02/22  0435 11/03/22  0936 11/04/22  0510    141 141   K 4.6 3.9 4.6    109 106   CO2 21 23 22   PHOS  --   --  3.5   BUN 33* 34* 29*   CREATININE 1.9* 1.8* 1.8*     Hepatic: No results for input(s): AST, ALT, ALB, BILITOT, ALKPHOS in the last 72 hours. Troponin: No results for input(s): TROPONINI in the last 72 hours. BNP: No results for input(s): BNP in the last 72 hours. Lipids: No results for input(s): CHOL, HDL in the last 72 hours.     Invalid input(s): LDLCALCU  ABGs:   Lab Results   Component Value Date/Time    PO2ART 37 10/24/2022 04:15 PM    LPC0CFV 49.0 10/24/2022 04:15 PM     INR: No results for input(s): INR in the last 72 hours.   Renal Labs  Albumin:    Lab Results   Component Value Date/Time    LABALBU 3.2 11/04/2022 05:10 AM     Calcium:    Lab Results   Component Value Date/Time    CALCIUM 9.0 11/04/2022 05:10 AM     Phosphorus:    Lab Results   Component Value Date/Time    PHOS 3.5 11/04/2022 05:10 AM     U/A:    Lab Results   Component Value Date/Time    NITRU NEGATIVE 10/24/2022 11:00 AM    NITRU NEGATIVE 03/23/2013 02:24 PM    COLORU YELLOW 10/24/2022 11:00 AM    WBCUA 36 10/24/2022 11:00 AM    RBCUA 59 10/24/2022 11:00 AM    MUCUS RARE 10/24/2022 11:00 AM    TRICHOMONAS NONE SEEN 10/24/2022 11:00 AM    YEAST FEW 04/06/2022 04:45 PM    BACTERIA NEGATIVE 10/24/2022 11:00 AM    CLARITYU CLEAR 10/24/2022 11:00 AM    SPECGRAV 1.015 10/24/2022 11:00 AM    UROBILINOGEN 0.2 10/24/2022 11:00 AM    BILIRUBINUR NEGATIVE 10/24/2022 11:00 AM    BLOODU MODERATE 10/24/2022 11:00 AM    GLUCOSEU 1,000 03/23/2013 02:24 PM    KETUA NEGATIVE 10/24/2022 11:00 AM    AMORPHOUS RARE 11/16/2021 12:28 PM     ABG:    Lab Results   Component Value Date/Time    QVO5RSJ 49.0 10/24/2022 04:15 PM    PO2ART 37 10/24/2022 04:15 PM    UNU2OXB 25.2 10/24/2022 04:15 PM     HgBA1c:    Lab Results   Component Value Date/Time    LABA1C 6.0 11/03/2022 01:14 PM     Microalbumen/Creatinine ratio:  No components found for: RUCREAT  TSH:  No results found for: TSH  IRON:    Lab Results   Component Value Date/Time    IRON 55 11/03/2022 01:14 PM     Iron Saturation:  No components found for: PERCENTFE  TIBC:    Lab Results   Component Value Date/Time    TIBC 239 11/03/2022 01:14 PM     FERRITIN:    Lab Results   Component Value Date/Time    FERRITIN 508 11/03/2022 01:14 PM     RPR:  No results found for: RPR  MITALI:  No results found for: ANATITER, MITALI  24 Hour Urine for Creatinine Clearance:  No components found for: CREAT4, UHRS10, UTV10      Objective:   I/O: 11/03 0701 - 11/04 0700  In: 178   Out: 400 [Urine:400]  I/O last 3 completed shifts: In: 178 [Blood:178]  Out: 400 [Urine:400]  No intake/output data recorded. Vitals: BP (!) 149/117   Pulse 63   Temp 97.4 °F (36.3 °C) (Oral)   Resp 15   Ht 5' 8\" (1.727 m)   Wt 200 lb 9.9 oz (91 kg)   SpO2 100%   BMI 30.50 kg/m²  {  General appearance: awake weak  HEENT: Head: Normal, normocephalic, atraumatic.   Neck: supple, symmetrical, trachea midline  Lungs: diminished breath sounds bilaterally  Heart: S1, S2 normal  Abdomen: abnormal findings:  soft nt  Extremities: edema trace  Neurologic: Mental status: alertness: Weak confused        Assessment and Plan:      IMP:  #1 metabolic encephalopathy/dementia  #2 type 2 diabetes with hypoglycemia  #3 stage III chronic kidney disease baseline creatinine 1.6-1.8  #4 anemia  #5 Enterococcus bacteremia  #6 hypertension uncontrolled       Plan     #1 monitor affect with neurology likely baseline dementia  #2 monitor glucose control adjust with insulin  #3 creatinine of 1.8 close to baseline  #4 hemoglobin increased to 9.1 stable no active bleeding  #5 treated for bacteremia  #6 blood pressure still increased but also agitated if stays 1 33-9 50 systolic with diastolic under 90 that is a baseline for him for now would not be more aggressive with medications    Okay from renal standpoint to discharge and follow-up outpatient           Sam Funes MD, MD

## 2022-11-04 NOTE — PROGRESS NOTES
V2.0  [facility] Hospitalist Progress Note      Name:  Yuki Joyner /Age/Sex: 1940  (78 y.o. male)   MRN & CSN:  [id number] & [id number] Encounter Date/Time: 2022 8:07 AM EDT    Location:  [address] [place] [address]-A PCP: Darian Guzman MD       Hospital Day: 12    Assessment and Plan:   Yuki Joyner is a 78 y.o. male with pmh of recent Candida UTI and Candida fungemia who presents with hypoglycemia      November for update-appreciate hematology consult. Retacrit ordered. Blood pressure was high this morning, and has since improved. November 3 update-rapid response called this morning. Had been without Lyrica for about 10 days, it was restarted last night. Rapid response called for lethargy today. Upon arrival to staff the does sternal rub and he woke up. Blood pressure was very high. Consulted nephrology - d/w Dr. Rebecca Barragan. He appears to have an encephalopathy. MRI came back negative. Discussed with neurology. -Repeat blood cultures are pending.    -Hemoglobin came back 6.9. Transfusion ordered. We will consult hematology in the morning. -received a message from physical therapy that confusion is worse  -He was apparently on Lyrica at home. The dose was 50 mg twice daily. He is somewhat agitated today. We will restart Lyrica. He has been without it for about 9 days.  -We will consult neurology. Plan:  Acute severe and refractory hypoglycemia, can be secondary to decreased p.o. intake and with Lantus use. No more episodes of hypoglycemia in last 24 hours. Acute metabolic encephalopathy S/T severe and refractory hypoglycemia, Resolved. CT chest, abdomen, pelvis ordered showed probable pyelonephritis of L kidney, was initially started on meropenem but urine cultures were negative so was discontinued. Cystoscopy/left ureteral stent exchange as outpatient     3.   Enterococcus raffinosus bacteremia: 1 out of 4 bottles were positive for it, will start patient on ampicillin and consult ID. Repeat blood cultures are negative     4. Elevated troponin  trop T 0.044, chronically elevated in setting of CKD, EKG with sinus bradycardia, no acute ST T wave changes     5. Chronic anemia - Hgb 8.0, near baseline   6. CKDIII Cr 2.0, near baseline avoid nephrotoxins       Diet ADULT DIET; Regular; 5 carb choices (75 gm/meal)  ADULT ORAL NUTRITION SUPPLEMENT; Lunch, Dinner; Diabetic Oral Supplement   DVT Prophylaxis [x] Lovenox, []  Heparin, [] SCDs, [] Ambulation,  [] Eliquis, [] Xarelto  [] Coumadin   Code Status Full Code   Disposition From: SNF  Expected Disposition: SNF  Estimated Date of Discharge: Tomorrow  Patient requires continued admission due observe another 24 hours, and discharge tomorrow if stable   Surrogate Decision Maker/ POA Greg his Child listed as alternate contact in chart     Subjective:     Chief Complaint: Hypoglycemia (39 per ems upon arrival. Glucagon given at nursing home and D10 given per medics)       Abby Mckee is a 78 y.o. male     November 2:    When I saw him he said that his son is coming to pick him up and take him home. I did speak with his son. That was not the case. November 3: Had a rapid response because he was unresponsive. November 4: Blood pressure was high this morning, but encephalopathy appears improving. Review of Systems:    Review of Systems    No bleeding or vomiting reported. ED triage note:      [facility]. Patient was found to have altered mental status this morning with a BG of 50 per the nursing home. Nursing staff gave patient glucagon. When EMS arrived patient BG was 34 per EMS. Ems started D10 and got BG to 84 PTA. Patient BG 83 upon arrival. Patient is alert and oriented x4 upon arrival. VS stable. ED provider diagnosis:      Clinical Impression:  1. Hypoglycemia    2.  Hypothermia, initial encounter        Was in the ICU from 10/24 until 10/28 - was on D10 at 100 mL per hour for about 36 hours  Was on 3 N 10/28 until 10/31    Came to 1 N 10/31    Per ID note 10/30:  Await repeat BC re-ordered on 10/31, if negative, will DC ampicillin      BC:    ENTEROCOCCUS RAFFINOSUS POSITIVE for Isolated one of two sets Abnormal             Objective: Intake/Output Summary (Last 24 hours) at 11/4/2022 1003  Last data filed at 11/4/2022 0428  Gross per 24 hour   Intake 178 ml   Output 400 ml   Net -222 ml          Vitals:   Vitals:    11/04/22 0812   BP: (!) 149/117   Pulse: 63   Resp: 15   Temp: 97.4 °F (36.3 °C)   SpO2: 100%       Physical Exam:     Physical Exam  Constitutional:       Appearance: He is not diaphoretic. Pulmonary:      Effort: Pulmonary effort is normal.   Neurological:      Cranial Nerves: No cranial nerve deficit.          Medications:   Medications:    insulin lispro  0-8 Units SubCUTAneous TID WC    insulin lispro  0-8 Units SubCUTAneous 2 times per day    insulin lispro  10 Units SubCUTAneous TID WC    insulin glargine  15 Units SubCUTAneous Nightly    heparin (porcine)  5,000 Units SubCUTAneous 3 times per day    atorvastatin  80 mg Oral Nightly    amLODIPine  5 mg Oral BID    pregabalin  50 mg Oral BID    pantoprazole  40 mg Oral QAM AC    sodium chloride flush  5-40 mL IntraVENous 2 times per day    dextrose bolus  250 mL IntraVENous Once    carvedilol  6.25 mg Oral BID WC    ferrous sulfate  325 mg Oral Daily with breakfast    levothyroxine  75 mcg Oral Daily    aspirin  81 mg Oral Nightly      Infusions:    sodium chloride      sodium chloride 75 mL/hr at 11/01/22 2046    dextrose       PRN Meds: labetalol, 10 mg, Q2H PRN  hydrALAZINE, 10 mg, Q2H PRN  sodium chloride, , PRN  cloNIDine, 0.1 mg, Q8H PRN  dextrose bolus, 125 mL, PRN   Or  dextrose bolus, 250 mL, PRN  sodium chloride flush, 5-40 mL, PRN  sodium chloride, , PRN  ondansetron, 4 mg, Q8H PRN   Or  ondansetron, 4 mg, Q6H PRN  polyethylene glycol, 17 g, Daily PRN  acetaminophen, 650 mg, Q6H PRN Or  acetaminophen, 650 mg, Q6H PRN  glucose, 4 tablet, PRN  dextrose bolus, 125 mL, PRN   Or  dextrose bolus, 250 mL, PRN  glucagon (rDNA), 1 mg, PRN  dextrose, , Continuous PRN      Labs      Recent Results (from the past 24 hour(s))   POCT Glucose    Collection Time: 11/03/22 10:13 AM   Result Value Ref Range    POC Glucose 219 (H) 70 - 99 MG/DL   TYPE AND SCREEN    Collection Time: 11/03/22  1:14 PM   Result Value Ref Range    ABO/Rh O POSITIVE     Antibody Screen NEGATIVE     Unit Number [id number]     Component LEUKO-POOR RED CELLS     Unit Divison 00     Status ISSUED,FINAL     Transfusion Status OK TO TRANSFUSE     Crossmatch Result COMPATIBLE    ANEMIA PANEL    Collection Time: 11/03/22  1:14 PM   Result Value Ref Range    WBC UNABLE TO PERFORM TESTING: SPECIMEN CLOTTED 4.0 - 10.5 K/CU MM    RBC UNABLE TO PERFORM TESTING: SPECIMEN CLOTTED 4.6 - 6.2 M/CU MM    Hemoglobin UNABLE TO PERFORM TESTING: SPECIMEN CLOTTED 13.5 - 18.0 GM/DL    Hematocrit UNABLE TO PERFORM TESTING: SPECIMEN CLOTTED 42 - 52 %    MCV UNABLE TO PERFORM TESTING: SPECIMEN CLOTTED 78 - 100 FL    MCH UNABLE TO PERFORM TESTING: SPECIMEN CLOTTED 27 - 31 PG    MCHC UNABLE TO PERFORM TESTING: SPECIMEN CLOTTED 32.0 - 36.0 %    RDW UNABLE TO PERFORM TESTING: SPECIMEN CLOTTED 11.7 - 14.9 %    Platelets UNABLE TO PERFORM TESTING: SPECIMEN CLOTTED 140 - 440 K/CU MM    MPV UNABLE TO PERFORM TESTING: SPECIMEN CLOTTED 7.5 - 11.1 FL    Differential Type UNABLE TO PERFORM TESTING: SPECIMEN CLOTTED     Segs Relative UNABLE TO PERFORM TESTING: SPECIMEN CLOTTED 36 - 66 %    Vitamin B-12 624.3 211 - 911 pg/ml    Folate 14.3 3.1 - 17.5 NG/ML    Iron 55 (L) 59 - 158 ug/dL    UIBC 184 110 - 370 ug/dL    TIBC 239 (L) 250 - 450 ug/dL    Transferrin % 23 10 - 44 %    Retic Ct Pct UNABLE TO PERFORM TESTING: SPECIMEN CLOTTED 0.2 - 2.20 %    Ferritin 508 (H) 30 - 400 NG/ML   Hemoglobin A1C    Collection Time: 11/03/22  1:14 PM   Result Value Ref Range    Hemoglobin A1C 6.0 4.2 - 6.3 %    eAG 126 mg/dL   SPECIMEN REJECTION    Collection Time: 11/03/22  1:14 PM   Result Value Ref Range    Rejected Test CD     Reason for Rejection UNABLE TO PERFORM TESTING:    POCT Glucose    Collection Time: 11/03/22  5:18 PM   Result Value Ref Range    POC Glucose 192 (H) 70 - 99 MG/DL   POCT Glucose    Collection Time: 11/03/22  8:47 PM   Result Value Ref Range    POC Glucose 187 (H) 70 - 99 MG/DL   Hemoglobin and Hematocrit    Collection Time: 11/03/22  9:57 PM   Result Value Ref Range    Hemoglobin 8.4 (L) 13.5 - 18.0 GM/DL    Hematocrit 26.1 (L) 42 - 52 %   POCT Glucose    Collection Time: 11/04/22  3:49 AM   Result Value Ref Range    POC Glucose 205 (H) 70 - 99 MG/DL   CBC with Auto Differential    Collection Time: 11/04/22  5:10 AM   Result Value Ref Range    WBC 6.7 4.0 - 10.5 K/CU MM    RBC 2.96 (L) 4.6 - 6.2 M/CU MM    Hemoglobin 9.1 (L) 13.5 - 18.0 GM/DL    Hematocrit 28.9 (L) 42 - 52 %    MCV 97.6 78 - 100 FL    MCH 30.7 27 - 31 PG    MCHC 31.5 (L) 32.0 - 36.0 %    RDW 16.7 (H) 11.7 - 14.9 %    Platelets 750 706 - 798 K/CU MM    MPV 12.5 (H) 7.5 - 11.1 FL    Differential Type AUTOMATED DIFFERENTIAL     Segs Relative 53.2 36 - 66 %    Lymphocytes % 25.4 24 - 44 %    Monocytes % 7.9 (H) 0 - 4 %    Eosinophils % 12.6 (H) 0 - 3 %    Basophils % 0.6 0 - 1 %    Segs Absolute 3.6 K/CU MM    Lymphocytes Absolute 1.7 K/CU MM    Monocytes Absolute 0.5 K/CU MM    Eosinophils Absolute 0.9 K/CU MM    Basophils Absolute 0.0 K/CU MM    Nucleated RBC % 0.0 %    Total Nucleated RBC 0.0 K/CU MM    Total Immature Neutrophil 0.02 K/CU MM    Immature Neutrophil % 0.3 0 - 0.43 %   Reticulocytes    Collection Time: 11/04/22  5:10 AM   Result Value Ref Range    Retic Ct Pct 1.4 0.2 - 2.20 %   Renal Function Panel    Collection Time: 11/04/22  5:10 AM   Result Value Ref Range    Sodium 141 135 - 145 MMOL/L    Potassium 4.6 3.5 - 5.1 MMOL/L    Chloride 106 99 - 110 mMol/L    CO2 22 21 - 32 MMOL/L    Anion Gap 13 4 - 16    BUN 29 (H) 6 - 23 MG/DL    Creatinine 1.8 (H) 0.9 - 1.3 MG/DL    Est, Glom Filt Rate 37 (L) >60 mL/min/1.73m2    Glucose 186 (H) 70 - 99 MG/DL    Calcium 9.0 8.3 - 10.6 MG/DL    Albumin 3.2 (L) 3.4 - 5.0 GM/DL    Phosphorus 3.5 2.5 - 4.9 MG/DL   POCT Glucose    Collection Time: 11/04/22  6:48 AM   Result Value Ref Range    POC Glucose 178 (H) 70 - 99 MG/DL        Imaging/Diagnostics Last 24 Hours   No results found.     Electronically signed by Saji Maldonado MD on 11/4/2022 at 10:03 AM

## 2022-11-04 NOTE — PROGRESS NOTES
Progress Note( Dr. Amilcar Riddle)  11/4/2022  Subjective:   Admit Date: 10/24/2022  PCP: Karthik Garcia MD    Admitted For :Altered mental State he was at [facility] For:   Better control of blood glucose. Patient is having hypoglycemic episodes      Interval History: Patient is more alert and awake To himself this morning as much confused as before   Has anemia with hemoglobin of 6.9 yesterday was 9.1    Denies any chest pains,   Denies SOB . Denies nausea or vomiting. Trying to eat better  No new bowel or bladder symptoms. Intake/Output Summary (Last 24 hours) at 11/4/2022 1620  Last data filed at 11/4/2022 0428  Gross per 24 hour   Intake 178 ml   Output 400 ml   Net -222 ml       DATA    CBC:   Recent Labs     11/03/22  0936 11/03/22  1314 11/03/22  2157 11/04/22  0510   WBC 8.9 UNABLE TO PERFORM TESTING: SPECIMEN CLOTTED  --  6.7   HGB 6.9* UNABLE TO PERFORM TESTING: SPECIMEN CLOTTED 8.4* 9.1*    UNABLE TO PERFORM TESTING: SPECIMEN CLOTTED  --  225    CMP:  Recent Labs     11/02/22  0435 11/03/22  0936 11/04/22  0510    141 141   K 4.6 3.9 4.6    109 106   CO2 21 23 22   BUN 33* 34* 29*   CREATININE 1.9* 1.8* 1.8*   CALCIUM 9.1 8.8 9.0   LABALBU  --   --  3.2*     Lipids: No results found for: CHOL, HDL, TRIG  Glucose:  Recent Labs     11/04/22  0349 11/04/22  0648 11/04/22  1115   POCGLU 205* 178* 257*     [id number]:  Lab Results   Component Value Date/Time    LABA1C 6.0 11/03/2022 01:14 PM     High Sensitivity TSH:   Lab Results   Component Value Date/Time    TSHHS 5.100 11/02/2022 04:35 AM     Free T3: No results found for: FT3  Free T4:  Lab Results   Component Value Date/Time    T4FREE 1.48 11/03/2022 09:36 AM       XR CHEST PORTABLE   Final Result   No acute cardiopulmonary abnormality. MRI BRAIN WO CONTRAST   Final Result   1. No acute intracranial abnormality. No acute infarct.    2. Mild-to-moderate global parenchymal volume loss with mild-to-moderate   chronic microvascular ischemic changes. 3. Chronic lacunar infarct along the right external capsule. CTA HEAD NECK W CONTRAST   Final Result   Stable CTA head/neck compared to prior. 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 Moderate stenosis of the right V1 cervical vertebral artery origin, unchanged. Cta head:      Focal severe stenosis of a left M2 branch MCA, right P2/P3 PCA, and left V4   vertebral artery, unchanged. Moderate to severe stenosis right supraclinoid   ICA, unchanged. Other areas of left severe stenosis discussed above,   unchanged. CT HEAD WO CONTRAST   Final Result   Chronic findings in the brain without acute CT abnormality identified. CT CHEST ABDOMEN PELVIS W CONTRAST Additional Contrast? None   Final Result      Negative for pneumonia. Mild bibasilar opacities which are most likely   atelectasis. Small fixed hiatus hernia. Abdomen/Pelvis:      Liver: Liver is normal density. No enhancing masses. Normal enhancement of   the intrahepatic vasculature. Spleen:  Normal size. No enhancing masses calcified granulomata in the   spleen. Pancreas: No enhancing masses. No ductal dilation. No adjacent fatty   stranding. Gallbladder no calcified stones or sludge. No pericholecystic fluid. No   wall thickening. Bile ducts: No biliary ductal dilation      Adrenals: The adrenal glands are unremarkable      Kidneys: Urinary tract stent on the left. The left kidney is mildly   atrophic. Perinephric stranding on the left and mild decreased enhancement   of the cortex when compared to the right. However, no hydronephrosis. No   large mass. Heterogeneity of the contrast enhancement on the left. Left   urinary tract stent extends into the bladder. No stones along the pathway of   the left ureter. GI: No small bowel dilation. No colonic wall thickening. No large mass. The stomach is unremarkable in appearance but it is underdistended. Moderate   amount of stool throughout the colon. Mesentery: No enlarged lymphadenopathy. No free fluid. No free gas. Aorta: Aorta is of normal size. Negative for dissection. IVC is   unremarkable. Celiac axis and SMA are patent. Portal vein is patent. Atherosclerotic change of the aorta in the renal arteries. PELVIS      GI: No small bowel dilation. No colonic wall thickening. No enlarged   lymphadenopathy. Small amount of free fluid in the pelvis. : The bladder is unremarkable in appearance. No large mass. Suprapubic   catheter. The bladder is completely decompressed. Prostate appears to have   been resected. Phleboliths in the pelvis. Multiple clips in the pelvis. No   enlarged lymphadenopathy. Osseous: Bone island in the right ilium. No lytic destructive lesions. Bone   island in the right acetabulum also unchanged. IMPRESSION:   1. Left urinary tract stent is in place. Probable pyelonephritis of the left   kidney. CT HEAD WO CONTRAST   Final Result   No acute intracranial abnormality. XR CHEST PORTABLE   Final Result   1. No active pulmonary disease.               Scheduled Medicines   Medications:    insulin lispro  0-8 Units SubCUTAneous TID WC    insulin lispro  0-8 Units SubCUTAneous 2 times per day    insulin lispro  10 Units SubCUTAneous TID WC    insulin glargine  15 Units SubCUTAneous Nightly    heparin (porcine)  5,000 Units SubCUTAneous 3 times per day    atorvastatin  80 mg Oral Nightly    amLODIPine  5 mg Oral BID    pregabalin  50 mg Oral BID    pantoprazole  40 mg Oral QAM AC    sodium chloride flush  5-40 mL IntraVENous 2 times per day    dextrose bolus  250 mL IntraVENous Once    carvedilol  6.25 mg Oral BID WC    ferrous sulfate  325 mg Oral Daily with breakfast    levothyroxine  75 mcg Oral Daily    aspirin  81 mg Oral Nightly      Infusions: sodium chloride      sodium chloride 75 mL/hr at 11/01/22 2046    dextrose           Objective:   Vitals: /63   Pulse 63   Temp 97.4 °F (36.3 °C) (Oral)   Resp 15   Ht 5' 8\" (1.727 m)   Wt 200 lb 9.9 oz (91 kg)   SpO2 100%   BMI 30.50 kg/m²   General appearance: alert and cooperative with exam  Neck: no JVD or bruit  Thyroid : Normal lobes   Lungs: Has Vesicular Breath sounds   Heart:  regular rate and rhythm  Abdomen: soft, non-tender; bowel sounds normal; no masses,  no organomegaly  Musculoskeletal: Normal  Extremities: extremities normal, , no edema  Neurologic:  Awake, alert, oriented to name, place and time. Better than before . cranial nerves II-XII are grossly intact. Motor placed sensory neuropathy,  and gait is abnormal and unstable.     Assessment:     Patient Active Problem List:     Gait disturbance     Generalized weakness     Diabetes mellitus (HCC)     Osteoarthritis     Anemia of chronic disorder     Infected implanted bladder sphincter cuff     Escherichia coli urinary tract infection     Hypertension     Sepsis ([address]     Acute encephalopathy     Type 2 diabetes mellitus with hypoglycemia without coma ([facility])     UTI (urinary tract infection) due to urinary indwelling catheter ([facility])     Hyperkalemia     Acute kidney failure ([facility])     Leg weakness, bilateral     Type 2 diabetes mellitus with neurological manifestations, uncontrolled     Complicated UTI (urinary tract infection)     Essential hypertension     Severe muscle deconditioning     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 (HCC)     Frequent falls     Chronic kidney disease, stage 3a ([address]     Uncontrolled type 2 diabetes mellitus with peripheral neuropathy     Prostate cancer (HCC)     Suprapubic catheter ([address]     Sepsis due to urinary tract infection ([address]     Coagulase negative Staphylococcus bacteremia     Chronic kidney disease, stage IV (severe) ([facility])     Acute metabolic encephalopathy     SVT (supraventricular tachycardia) ([facility]     Metabolic encephalopathy     History of prostate cancer     Cigarette nicotine dependence without complication     Altered mental status     Serratia marcescens infection     Hypoglycemia     Hospital discharge follow-up     Fungemia     Pyelonephritis     Bacteremia due to Enterococcus      Plan:     Reviewed POC blood glucose . Labs and X ray results   Reviewed Current Medicines   On meal/ Correction bolus Humalog/ Basal Lantus Insulin regime /  Monitor Blood glucose frequently   Modified  the dose of Insulin/ other medicines as needed   Will follow     .      Edna Toney MD, MD

## 2022-11-04 NOTE — PROGRESS NOTES
Occupational Therapy     Occupational Therapy Treatment Note     Name: Yoselyn Alfaro MRN: [id number] :             1940   Date:  2022   Admission Date: 10/24/2022 Room:  [address] [place] [address]-A      Primary Problem:  The primary encounter diagnosis was Hypoglycemia. A diagnosis of Hypothermia, initial encounter was also pertinent to this visit. Restrictions/Precautions:  general precautions, fall risk, tele, external cath      Communication with other providers: Co tx with PTA Chidi Hanley and RN notified      Subjective:  Patient states:  Pt agreeable to therapy session. Pain:    Location, Type, Intensity (0/10 to 10/10):  denies pain     Objective:    Observation: Pt supine in bed upon arrival. son at bedside. Objective Measures:  Pt alert and oriented to self and noted confusion. Treatment, including education:  Therapeutic Activity Training:   Therapeutic activity training was instructed today. Cues were given for safety, sequence, UE/LE placement, awareness, and balance. Activities performed today included bed mobility training, sup-sit, sit-stand, SPT. Pt supine in bed upon arrival and completed bed level stretching as patient appeared very rigid and  Pt completed AAROM BUE to assist in bed mobility. Pt completed bed mobility supine to to sit with MOD A x 1 for trunk and MIN A x1 for legs with verbal cues for sequencing and hand placement. Pt seated edge of bed with close SBA. Pt completed AAROM seated edge of bed to bilateral UE targeting shoulders and elbows. Pt required MIN A to scoot hips toward edge of bed. Gait belt donned and pt completed sit to stand from edge with WW and MIN A and completed functional mobility to chair with MIN A x1 with verbal and tactile cues for sequencing and guidance of hips and MOD A x 2 to sit in chair. Pt completed sit to stand from armed chair with MOD A x2 with verbal cues for hand placement. Pt completed approx 10ft functional mobility.   Pt seated in chair with all needs met and call light in reach and chair alarm on. Assessment / Impression:    Patient's tolerance of treatment: fair+  Adverse Reaction: None  Significant change in status and impact: none  Barriers to improvement: cognition     Plan for Next Session:    Continue OT POC and progress functional ADL transfer and seated grooming edge of bed.      Time in:  1342  Time out:  1421  Timed treatment minutes:  39  Total treatment time:  39        Electronically signed by:    KHAIR Scott,   11/4/2022, 1342 pm

## 2022-11-04 NOTE — PROGRESS NOTES
Physical Therapy    Physical Therapy Treatment Note  Name: Sophia Russ MRN: [id number] :   1940   Date:  2022   Admission Date: 10/24/2022 Room:  [address] [place] [address]   Restrictions/Precautions:          Fall risk, mild confusion, suprapubic catheter with temp. Monitor and pt has external catheters , tele, IV, fall risk, Contact  Communication with other providers:  RN notified that patient ambulated with rehab. No hallucination today  Subjective:  Patient states:  pt agreeable and motivated to participate in tx. Needs moderate cues to orient patient to place and situation  Pain:   Location, Type, Intensity (0/10 to 10/10):  no c/o pain during tx session  Objective:    Observation:  alert and oriented to self and was able to follow cues for mobility. Needs moderate cues for orientation to situation    Treatment, including education/measures:  Supine exercises:  AAROM SLR with Min resistance during return x10  AAROM Heel slides with moderate resistance with return to neutral x10  PROM ankle DF/PF x10    Transfers:  Sup to sit with HOB up using bed rails and Mod assist + Min A of another with BLE and trunk management  Sit to stand: from EOB Min A + CGA of another from EOB. From recliner Mod A x 2. Max cues for increase Bue effort from tranfers surface. Standing balance: Patient is able to stand at [employer] with Min A for steadying, Mod A for appropriate anterior weight shift over TYSHAWN. Stand to sit: Mod A x2 with hip guidance to recliner  SPT: Min A x 2 for hip guidance to recliner. Max cues for patient to release UE from RW to recliner for safety    Gait training:  Patient ambulated with RW at [address] [place] [address], with moderate tactile cueing for BLE sequencing with RW, Occasional CGA-Min A from another d/t patient having BLE fatigue and he declines to poor BLE posture and positioning. Max cues for patient for seated rest break.      Safety  Patient left safely in the recliner, matheus pad under patient, with call light/phone in reach with alarm applied. Gait belt was used for transfers and gait. Assessment / Impression:  Patient continues to demo confusion. However, patient achieves purposeful gait training today. Patient's tolerance of treatment:  Good   Adverse Reaction: na  Significant change in status and impact:  na  Barriers to improvement:  strength and safety  Plan for Next Session:    Cont. POC  Time in:  1342  Time out:  1421  Timed treatment minutes:  39  Total treatment time:  44    Previously filed items:           Short Term Goals  Time Frame for Short Term Goals: 1 week  Short Term Goal 1: Pt will perform sup>sit with Min A and cues  Short Term Goal 2: Pt will perform standing balance tasks x5' with UE support and Min A  Short Term Goal 3: Pt will AMB x15 ft with RW, chair follow, Mod A    Electronically signed by:     Tyrel Novak PTA   11/4/2022, 2:31 PM

## 2022-11-04 NOTE — PROGRESS NOTES
Neurology Service Progress Note  [address]   Patient Name: Milena Calvo  : 1940        Subjective:   CC: Altered Mental Status  Chart was reviewed in detail. Patient was seen and assessed. He received PRBC yesterday. He is more alert and oriented x 4 with underlying confusion. Patient is able to follow commands without difficulty. Speech is clear, language is fluent. He is able to answer all orientation questions appropriately. He then tells me he wasn't feeling well because he \"[place] and got caught in the rain, until he realized it was snow\". Despite this underlying confusion, the patient is showing significant improvement. No family was at bedside. Today. Past Medical History:   Diagnosis Date    Acute urinary tract infection 3/15/2012    Ataxia 2010    Diabetes mellitus ([facility] [address] 2011    type 2, controlled    Fusion of spine of cervical region 10/2012    Gait disturbance     History of prostate cancer     adenocarcinoma    History of tobacco use     Hyperlipidemia     Osteoarthritis 2011    :   Past Surgical History:   Procedure Laterality Date    BLADDER SURGERY      BLADDER SURGERY Left 2022    CYSTOSCOPY LEFT STENT EXCHANGE performed by Otto Soto MD at [facility] Left 2022    LEFT CYSTOSCOPY URETERAL STENT EXCHANGE AND [address] performed by Jenna Caldwell MD at [facility]  2013    Cysto with removal of artificial sphinter and placement of suprapubic catheter.     PROSTATE SURGERY      PROSTATECTOMY      infection - had cancer     Medications:  Scheduled Meds:   insulin lispro  0-8 Units SubCUTAneous TID WC    insulin lispro  0-8 Units SubCUTAneous 2 times per day    insulin lispro  10 Units SubCUTAneous TID WC    insulin glargine  15 Units SubCUTAneous Nightly    epoetin juan-epbx  10,000 Units SubCUTAneous Once per day on  Fri    heparin (porcine)  5,000 Units SubCUTAneous 3 times per day    atorvastatin  80 mg Oral Nightly    amLODIPine  5 mg Oral BID    pregabalin  50 mg Oral BID    pantoprazole  40 mg Oral QAM AC    sodium chloride flush  5-40 mL IntraVENous 2 times per day    dextrose bolus  250 mL IntraVENous Once    carvedilol  6.25 mg Oral BID WC    ferrous sulfate  325 mg Oral Daily with breakfast    levothyroxine  75 mcg Oral Daily    aspirin  81 mg Oral Nightly     Continuous Infusions:   sodium chloride      sodium chloride 75 mL/hr at 22    dextrose       PRN Meds:.labetalol, hydrALAZINE, sodium chloride, cloNIDine, dextrose bolus **OR** dextrose bolus, sodium chloride flush, sodium chloride, ondansetron **OR** ondansetron, polyethylene glycol, acetaminophen **OR** acetaminophen, glucose, dextrose bolus **OR** dextrose bolus, glucagon (rDNA), dextrose    No Known Allergies  Social History     Socioeconomic History    Marital status:      Spouse name: Not on file    Number of children: 3    Years of education: Not on file    Highest education level: Not on file   Occupational History    Not on file   Tobacco Use    Smoking status: Former     Packs/day: 0.50     Types: Cigarettes     Quit date: 1976     Years since quittin.3    Smokeless tobacco: Never   Vaping Use    Vaping Use: Not on file   Substance and Sexual Activity    Alcohol use: No     Comment: Quit in     Drug use: No    Sexual activity: Not on file   Other Topics Concern    Not on file   Social History Narrative    Not on file     Social Determinants of Health     Financial Resource Strain: Not on file   Food Insecurity: Not on file   Transportation Needs: Not on file   Physical Activity: Not on file   Stress: Not on file   Social Connections: Not on file   Intimate Partner Violence: Not on file   Housing Stability: Not on file      Family History   Problem Relation Age of Onset    Cancer Mother     Diabetes Father     Heart Disease Father     High Blood Pressure Father     Diabetes Sister     High Blood Pressure Sister     Cancer Brother         prostate, breast    Diabetes Brother     Cancer Maternal Uncle     Diabetes Maternal Grandmother     Stroke Maternal Grandfather          ROS (10 systems)  In addition to that documented in the HPI above, the additional ROS was obtained:  Constitutional: Denies fevers or chills  Eyes: Denies vision changes  ENMT: Denies sore throat  CV: Denies chest pain  Resp: Denies SOB  GI: Denies vomiting or diarrhea  : Denies painful urination  MSK: Denies recent trauma  Skin: Denies new rashes  Neuro: Denies new numbness or tingling or weakness  Endocrine: Denies unexpected weight loss  Heme: Denies bleeding disorders    Physical Exam:       Wt Readings from Last 3 Encounters:   11/01/22 200 lb 9.9 oz (91 kg)   09/20/22 206 lb 9.1 oz (93.7 kg)   08/09/22 197 lb 12.8 oz (89.7 kg)     Temp Readings from Last 3 Encounters:   11/04/22 97.8 °F (36.6 °C) (Oral)   10/05/22 97 °F (36.1 °C) (Infrared)   09/21/22 98.2 °F (36.8 °C)     BP Readings from Last 3 Encounters:   11/04/22 (!) 165/69   10/05/22 118/61   09/21/22 (!) 143/74     Pulse Readings from Last 3 Encounters:   11/04/22 62   10/05/22 59   09/21/22 69        Gen: A&O x 4, NAD, cooperative, alert, underlying confusion  HEENT: NC/AT, EOMI, PERRL, mmm,  neck supple, no meningeal signs;    Heart: SB on monitor  Lungs: Respirations even and  unlabored  Ext: no edema, no calf tenderness b/l  Psych: normal mood and affect, drowsy, hallucinations  Skin: no rashes or lesions    NEUROLOGIC EXAM:    Mental Status: A&O to self, month and year, location NAD, speech clear/slurred and voice soft, language fluent, repetition and naming intact, follows commands appropriately    Cranial Nerve Exam:   CN II-XII:  PERRL, VFF, no nystagmus, no gaze paresis, sensation V1-V3 intact b/l, muscles of facial expression symmetric; hearing intact to conversational tone, palate elevates symmetrically, shoulder elevation symmetric and tongue protrudes midline with movement side to side. Motor Exam:       Strength 5/5 UE's/LE's b/l  Tone and bulk normal   No pronator drift    Deep Tendon Reflexes: 2/4 biceps, triceps, brachioradialis, patellar, and achilles b/l; flexor plantar responses b/l    Sensation: Intact light touch/pinprick/vibration UE's/LE's b/l    Coordination/Cerebellum:       Tremors--none      Rapidly alternating movements: no dysdiadochokinesia b/l                Heel-to-Shin: deferred      Finger-to-Nose: no dysmetria b/l    Gait and stance:      Gait: deferred      LABS:     Recent Labs     11/02/22  0435 11/03/22  0936 11/03/22  1314 11/04/22  0510   WBC 9.0 8.9 UNABLE TO PERFORM TESTING: SPECIMEN CLOTTED  --     141  --  141   K 4.6 3.9  --  4.6    109  --  106   CO2 21 23  --  22   BUN 33* 34*  --  29*   CREATININE 1.9* 1.8*  --  1.8*   GLUCOSE 142* 237*  --  186*         IMAGING:    CT head w/o contrast:  Impression   Chronic findings in the brain without acute CT abnormality identified. CTA head and neck:  Impression   Stable CTA head/neck compared to prior. 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 Moderate stenosis of the right V1 cervical vertebral artery origin, unchanged. Cta head:       Focal severe stenosis of a left M2 branch MCA, right P2/P3 PCA, and left V4   vertebral artery, unchanged. Moderate to severe stenosis right supraclinoid   ICA, unchanged. Other areas of left severe stenosis discussed above,   unchanged. MRI brain w/o contrast:  Impression   1. No acute intracranial abnormality. No acute infarct. 2. Mild-to-moderate global parenchymal volume loss with mild-to-moderate   chronic microvascular ischemic changes. 3. Chronic lacunar infarct along the right external capsule.        All imaging was discussed with attending neurologist  Tavo    ASSESSMENT/PLAN:   78year old male presenting with hypoglycemia and altered mental status. Patient received PRBC yesterday. Today he is awake and alert. He is oriented to self, month, year. Location. Speech is clear, language is fluent. He is having some underlying confusion and is talking about [place] work [place] the [place]. Strength and sensation are intact in the upper and lower extremities. Altered mental status due to toxic metabolic encephalopathy superimposed on HTN, hypoglycemia, anemia, UTI, bacteremia and likely delirium. Neuro imaging as above - noting chronic stenosis of the left MCA, right PCA left vertebral artery   MRI brain w/o contrast completed noting chronic lacunar infarct along the right external capsule, no acute stroke  Continue Plavix and aspirin for 30 days then monotherapy with aspirin alone per [facility] recs  SBP goals 140-180 per [facility] recs  BUN 29/Creatinine 1.8, TSH 5.1, Hgb 8.4 - management per IM  Blood cultures 11/1 NG at 48 hours  Delirium precautions - Promote being awake during the day time hours with lights on, blinds open. Reorient as needed. Minimize waking patient from sleep during night time  hours as able. PT/OT as recommended  Will continue to follow pending neurologic improvement. Stable for discharge from neurology standpoint. > 35 minutes of time spent included chart review, obtaining history, patient examination, developing plan of care, and documentation. Patient was discussed with attending neurologist Dr. Manny Veras. Thank you for allowing us to participate in the care of your patient. If there are any questions regarding evaluation please feel free to contact us.        VIVIANA Matta CNP 11/4/2022 7:39 AM

## 2022-11-05 VITALS
TEMPERATURE: 97.5 F | SYSTOLIC BLOOD PRESSURE: 152 MMHG | OXYGEN SATURATION: 100 % | WEIGHT: 200.62 LBS | HEIGHT: 68 IN | DIASTOLIC BLOOD PRESSURE: 66 MMHG | RESPIRATION RATE: 20 BRPM | BODY MASS INDEX: 30.41 KG/M2 | HEART RATE: 65 BPM

## 2022-11-05 PROBLEM — E16.2 ACUTE METABOLIC ENCEPHALOPATHY DUE TO HYPOGLYCEMIA: Status: ACTIVE | Noted: 2022-03-30

## 2022-11-05 LAB
GLUCOSE BLD-MCNC: 175 MG/DL (ref 70–99)
GLUCOSE BLD-MCNC: 183 MG/DL (ref 70–99)
GLUCOSE BLD-MCNC: 213 MG/DL (ref 70–99)
GLUCOSE BLD-MCNC: 218 MG/DL (ref 70–99)
SARS-COV-2, NAAT: NOT DETECTED
SOURCE: NORMAL

## 2022-11-05 PROCEDURE — 6370000000 HC RX 637 (ALT 250 FOR IP): Performed by: INTERNAL MEDICINE

## 2022-11-05 PROCEDURE — 6370000000 HC RX 637 (ALT 250 FOR IP): Performed by: NURSE PRACTITIONER

## 2022-11-05 PROCEDURE — 99232 SBSQ HOSP IP/OBS MODERATE 35: CPT | Performed by: CLINICAL NURSE SPECIALIST

## 2022-11-05 PROCEDURE — 2580000003 HC RX 258: Performed by: PHYSICIAN ASSISTANT

## 2022-11-05 PROCEDURE — 94761 N-INVAS EAR/PLS OXIMETRY MLT: CPT

## 2022-11-05 PROCEDURE — 82962 GLUCOSE BLOOD TEST: CPT

## 2022-11-05 PROCEDURE — 6360000002 HC RX W HCPCS: Performed by: SURGERY

## 2022-11-05 PROCEDURE — 6370000000 HC RX 637 (ALT 250 FOR IP): Performed by: PHYSICIAN ASSISTANT

## 2022-11-05 PROCEDURE — 87635 SARS-COV-2 COVID-19 AMP PRB: CPT

## 2022-11-05 RX ORDER — BISACODYL 10 MG
10 SUPPOSITORY, RECTAL RECTAL DAILY PRN
Qty: 1 SUPPOSITORY | Refills: 0
Start: 2022-11-05

## 2022-11-05 RX ORDER — TORSEMIDE 20 MG/1
20 TABLET ORAL DAILY PRN
Qty: 1 TABLET | Refills: 0
Start: 2022-11-05 | End: 2022-11-05 | Stop reason: SDUPTHER

## 2022-11-05 RX ORDER — INSULIN LISPRO 100 [IU]/ML
0-8 INJECTION, SOLUTION INTRAVENOUS; SUBCUTANEOUS
Qty: 1 EACH | Refills: 0
Start: 2022-11-05 | End: 2022-11-05 | Stop reason: SDUPTHER

## 2022-11-05 RX ORDER — INSULIN LISPRO 100 [IU]/ML
0-8 INJECTION, SOLUTION INTRAVENOUS; SUBCUTANEOUS
Qty: 1 EACH | Refills: 0
Start: 2022-11-05

## 2022-11-05 RX ORDER — FERROUS SULFATE 325(65) MG
325 TABLET ORAL EVERY OTHER DAY
Qty: 30 TABLET | Refills: 3
Start: 2022-11-05

## 2022-11-05 RX ORDER — PREGABALIN 50 MG/1
50 CAPSULE ORAL 3 TIMES DAILY
Qty: 6 CAPSULE | Refills: 3 | Status: SHIPPED | OUTPATIENT
Start: 2022-11-05 | End: 2022-11-05 | Stop reason: HOSPADM

## 2022-11-05 RX ORDER — IPRATROPIUM BROMIDE AND ALBUTEROL SULFATE 2.5; .5 MG/3ML; MG/3ML
1 SOLUTION RESPIRATORY (INHALATION) EVERY 4 HOURS PRN
Qty: 360 ML | Refills: 0
Start: 2022-11-05

## 2022-11-05 RX ORDER — CARVEDILOL 6.25 MG/1
6.25 TABLET ORAL 2 TIMES DAILY
Qty: 1 TABLET | Refills: 0
Start: 2022-11-05

## 2022-11-05 RX ORDER — CLONIDINE HYDROCHLORIDE 0.1 MG/1
0.1 TABLET ORAL EVERY 8 HOURS PRN
Qty: 60 TABLET | Refills: 3
Start: 2022-11-05

## 2022-11-05 RX ORDER — AMLODIPINE BESYLATE 5 MG/1
5 TABLET ORAL 2 TIMES DAILY
Qty: 30 TABLET | Refills: 3
Start: 2022-11-05

## 2022-11-05 RX ORDER — CARVEDILOL 6.25 MG/1
12.5 TABLET ORAL 2 TIMES DAILY
Qty: 1 TABLET | Refills: 0
Start: 2022-11-05 | End: 2022-11-05 | Stop reason: SDUPTHER

## 2022-11-05 RX ORDER — TORSEMIDE 20 MG/1
20 TABLET ORAL DAILY PRN
Qty: 1 TABLET | Refills: 0
Start: 2022-11-05

## 2022-11-05 RX ORDER — INSULIN GLARGINE 100 [IU]/ML
15 INJECTION, SOLUTION SUBCUTANEOUS EVERY MORNING
Qty: 10 ML | Refills: 3
Start: 2022-11-05

## 2022-11-05 RX ORDER — INSULIN LISPRO 100 [IU]/ML
10 INJECTION, SOLUTION INTRAVENOUS; SUBCUTANEOUS
Qty: 1 ML | Refills: 0
Start: 2022-11-05

## 2022-11-05 RX ADMIN — INSULIN LISPRO 10 UNITS: 100 INJECTION, SOLUTION INTRAVENOUS; SUBCUTANEOUS at 10:14

## 2022-11-05 RX ADMIN — AMLODIPINE BESYLATE 5 MG: 5 TABLET ORAL at 10:17

## 2022-11-05 RX ADMIN — INSULIN LISPRO 2 UNITS: 100 INJECTION, SOLUTION INTRAVENOUS; SUBCUTANEOUS at 03:09

## 2022-11-05 RX ADMIN — CARVEDILOL 6.25 MG: 6.25 TABLET, FILM COATED ORAL at 10:17

## 2022-11-05 RX ADMIN — HEPARIN SODIUM 5000 UNITS: 5000 INJECTION INTRAVENOUS; SUBCUTANEOUS at 08:16

## 2022-11-05 RX ADMIN — SODIUM CHLORIDE, PRESERVATIVE FREE 5 ML: 5 INJECTION INTRAVENOUS at 10:21

## 2022-11-05 RX ADMIN — LEVOTHYROXINE SODIUM 75 MCG: 0.07 TABLET ORAL at 08:16

## 2022-11-05 RX ADMIN — FERROUS SULFATE TAB 325 MG (65 MG ELEMENTAL FE) 325 MG: 325 (65 FE) TAB at 10:17

## 2022-11-05 RX ADMIN — PREGABALIN 50 MG: 50 CAPSULE ORAL at 10:17

## 2022-11-05 RX ADMIN — PANTOPRAZOLE SODIUM 40 MG: 40 TABLET, DELAYED RELEASE ORAL at 08:16

## 2022-11-05 NOTE — PROGRESS NOTES
Nephrology Progress Note  11/5/2022 11:17 AM  Subjective: Interval History: Jannet Mancini is a 78 y.o. male appears doing okay somewhat still confused but improved      Data:   Scheduled Meds:   insulin lispro  0-8 Units SubCUTAneous TID WC    insulin lispro  0-8 Units SubCUTAneous 2 times per day    insulin lispro  10 Units SubCUTAneous TID WC    insulin glargine  15 Units SubCUTAneous Nightly    heparin (porcine)  5,000 Units SubCUTAneous 3 times per day    atorvastatin  80 mg Oral Nightly    amLODIPine  5 mg Oral BID    pregabalin  50 mg Oral BID    pantoprazole  40 mg Oral QAM AC    sodium chloride flush  5-40 mL IntraVENous 2 times per day    dextrose bolus  250 mL IntraVENous Once    carvedilol  6.25 mg Oral BID WC    ferrous sulfate  325 mg Oral Daily with breakfast    levothyroxine  75 mcg Oral Daily    aspirin  81 mg Oral Nightly     Continuous Infusions:   sodium chloride      sodium chloride 75 mL/hr at 11/01/22 2046    dextrose           CBC   Recent Labs     11/03/22  0936 11/03/22  1314 11/03/22  2157 11/04/22  0510   WBC 8.9 UNABLE TO PERFORM TESTING: SPECIMEN CLOTTED  --  6.7   HGB 6.9* UNABLE TO PERFORM TESTING: SPECIMEN CLOTTED 8.4* 9.1*   HCT 21.8* UNABLE TO PERFORM TESTING: SPECIMEN CLOTTED 26.1* 28.9*    UNABLE TO PERFORM TESTING: SPECIMEN CLOTTED  --  225      BMP   Recent Labs     11/03/22  0936 11/04/22  0510    141   K 3.9 4.6    106   CO2 23 22   PHOS  --  3.5   BUN 34* 29*   CREATININE 1.8* 1.8*     Hepatic: No results for input(s): AST, ALT, ALB, BILITOT, ALKPHOS in the last 72 hours. Troponin: No results for input(s): TROPONINI in the last 72 hours. BNP: No results for input(s): BNP in the last 72 hours. Lipids: No results for input(s): CHOL, HDL in the last 72 hours.     Invalid input(s): LDLCALCU  ABGs:   Lab Results   Component Value Date/Time    PO2ART 37 10/24/2022 04:15 PM    MYT1ARI 49.0 10/24/2022 04:15 PM     INR: No results for input(s): INR in the last 72 hours. Renal Labs  Albumin:    Lab Results   Component Value Date/Time    LABALBU 3.2 11/04/2022 05:10 AM     Calcium:    Lab Results   Component Value Date/Time    CALCIUM 9.0 11/04/2022 05:10 AM     Phosphorus:    Lab Results   Component Value Date/Time    PHOS 3.5 11/04/2022 05:10 AM     U/A:    Lab Results   Component Value Date/Time    NITRU NEGATIVE 10/24/2022 11:00 AM    NITRU NEGATIVE 03/23/2013 02:24 PM    COLORU YELLOW 10/24/2022 11:00 AM    WBCUA 36 10/24/2022 11:00 AM    RBCUA 59 10/24/2022 11:00 AM    MUCUS RARE 10/24/2022 11:00 AM    TRICHOMONAS NONE SEEN 10/24/2022 11:00 AM    YEAST FEW 04/06/2022 04:45 PM    BACTERIA NEGATIVE 10/24/2022 11:00 AM    CLARITYU CLEAR 10/24/2022 11:00 AM    SPECGRAV 1.015 10/24/2022 11:00 AM    UROBILINOGEN 0.2 10/24/2022 11:00 AM    BILIRUBINUR NEGATIVE 10/24/2022 11:00 AM    BLOODU MODERATE 10/24/2022 11:00 AM    GLUCOSEU 1,000 03/23/2013 02:24 PM    KETUA NEGATIVE 10/24/2022 11:00 AM    AMORPHOUS RARE 11/16/2021 12:28 PM     ABG:    Lab Results   Component Value Date/Time    LRY1VRM 49.0 10/24/2022 04:15 PM    PO2ART 37 10/24/2022 04:15 PM    QGY5STX 25.2 10/24/2022 04:15 PM     HgBA1c:    Lab Results   Component Value Date/Time    LABA1C 6.0 11/03/2022 01:14 PM     Microalbumen/Creatinine ratio:  No components found for: RUCREAT  TSH:  No results found for: TSH  IRON:    Lab Results   Component Value Date/Time    IRON 55 11/03/2022 01:14 PM     Iron Saturation:  No components found for: PERCENTFE  TIBC:    Lab Results   Component Value Date/Time    TIBC 239 11/03/2022 01:14 PM     FERRITIN:    Lab Results   Component Value Date/Time    FERRITIN 508 11/03/2022 01:14 PM     RPR:  No results found for: RPR  MITALI:  No results found for: ANATITER, MITALI  24 Hour Urine for Creatinine Clearance:  No components found for: CREAT4, UHRS10, UTV10      Objective:   I/O: 11/04 0701 - 11/05 0700  In: -   Out: 550 [Urine:550]  I/O last 3 completed shifts:   In: 80 [Blood:178]  Out: 950 [Urine:950]  No intake/output data recorded. Vitals: BP (!) 147/68   Pulse 65   Temp 98.2 °F (36.8 °C) (Oral)   Resp 13   Ht 5' 8\" (1.727 m)   Wt 200 lb 9.9 oz (91 kg)   SpO2 100%   BMI 30.50 kg/m²  {  General appearance: awake weak  HEENT: Head: Normal, normocephalic, atraumatic. Neck: supple, symmetrical, trachea midline  Lungs: diminished breath sounds bilaterally  Heart: S1, S2 normal  Abdomen: abnormal findings:  soft nt  Extremities: edema trace  Neurologic: Mental status: alertness: Weak confused        Assessment and Plan:      IMP:  #1 metabolic encephalopathy/dementia  #2 type 2 diabetes with hypoglycemia  #3 stage III chronic kidney disease baseline creatinine 1.6-1.8  #4 anemia  #5 Enterococcus bacteremia  #6 hypertension uncontrolled       Plan     #1 monitor affect?   Baseline  #2 glucose well controlled stable  #3 creatinine 1.8 close to baseline  #4 hemoglobin was low stable  #5 treated for bacteremia no fever  #6 blood pressure better  Maintain supportive care current meds           Melinda Pisano MD, MD

## 2022-11-05 NOTE — CARE COORDINATION
11/3  pt had Rapid for low blood sugar and a Stroke Alert called last night. Per ID pt will not need any IV antibiotics. Pt is from the [facility]. CM per notes. Pt is to return to [facility] when medically cleared. Will need a Rapid covid. [address]  [address]50 CM called Tomasa at [facility]. CM informed that pt may return whenever medically cleared. Pt will need only a Rapid Covid. 1230 CM into room. Pt is sleeping. Son is at bedside. CM provided card and encouraged him to call for any need, discharge plan remains the same. Pt will be discharged to [facility] when medically cleared.  [address]
Chart reviewed patient plan is to discharge to Replaced by [facility]. If Pt discharged over the weekend. Please arrange transportation. Contact Pt family. Complete RN part of the [address]. Complete packet by printing AVS with MYRIAM, H&P, and facesheet. Call [phone] to give report. Please make sure the Pt has a rapid Covid resulted before discharging.
Cm contacted [facility] admissions, Zachary Razo, who advised that they have confirmed pt's [employer] affiliation. They will accept pt to return at discharge. They will work with family on [place] application.
JILLIAN called Tomasa at [facility] home to discuss discharge planning. JILLIAN left a .   [facility]
Pt admitted to [facility] from [facility]. JILLIAN contacted [facility]  admissions who advised that pt admitted to them on 9/21/22 for skilled care and has remained there until admission here. Pt has used his 20 medicare days with coverage at 100% and then his 10 additional days through [facility] for a total of 30 days. Admissions advises that family has indicated an interest in pt possibly staying at [facility] long term. [facility] has pt on there pending list which indicates that they do plan to accept pt back at discharge. [facility] is investigating to determine if pt has [employer] affiliation. Pt is not on medicaid. [facility] admissions will check on pt's ins status/planned method of payment etc related to his return to [facility] upon discharge and will contact JILLIAN with update. Perhaps pt has a case management benefit with his insurance a  is creating a SNF benefit for him. Jillian contacted pt's son, Michelle Kirkland. He and pt resided together prior to pt's stay in SNF. Michelle Kirkland confirms plan for pt return to [facility] at discharge. JILLIAN spoke with MD who advises possible discharge 10/27/22.
Attending Only

## 2022-11-05 NOTE — PROGRESS NOTES
Notified by pt nurse Nicol QUIÑONES that family states pt is missing a [employer] ring. Security was notified and will come and take report.   Ring is listed on pt belonging list.

## 2022-11-05 NOTE — DISCHARGE SUMMARY
V2.0  Discharge Summary    Name:  Aristeo Echeverria /Age/Sex: 1940 (40 y.o. male)   Admit Date: 10/24/2022  Discharge Date: 22    MRN & CSN:  [id number] & [id number] Encounter Date and Time 22 2:13 PM EDT    Attending:  Lieutenant Antolin MD Discharging Provider: Petra Ramsay MD       Hospital Course:     Brief HPI: Aristeo Echeverria is a 78 y.o. male diabetic on insulin who presented with an altered mental status. He was brought from [facility] home to the ED as his blood sugar was 50 at that facility. Nursing staff at the facility had given him glucagon. When the EMS arrived to the facility blood sugar was 34. The EMS started D10 drip and that brought his blood sugar up to 84 and he then presented to ED where his blood sugar was found to be 83 upon arrival.  He was admitted. Brief Problem Based Course: The patient was here in the hospital for 12 days. His course is described below. He will be going to [facility] skilled care upon discharge. His son indicated that the last time the patient was home was on . His son is hoping that eventually the patient will be able to go back home where he lives with his son Davina Brown. Severe and refractory hypoglycemia  -He was admitted to the ICU and was on D10 drip briefly  -Upon discharge his insulin dose has been lowered and very specific holding parameters for insulin have been written - as he sometimes does not eat well. Recommend giving mealtime insulin only after meals, - see instructions in MY[facility]    Metabolic encephalopathy due to hypoglycemia noted initially-improved    Enterococcus raffinosus to have blood culture noted upon admission  -Appreciate infectious disease consult. Repeat blood cultures came back negative, per infectious disease can consider the blood culture contaminants. Either way, the patient did receive a full course of 9 days of antibiotics.     Hypothermia  -noted on admission, could have been due to hypoglycemia. Anemia status post 1 unit packed red cells given on 11/3, which was hospital day 10  -Appreciate hematology consult  -Per hematology, anemia is likely due to kidney disease and use of aspirin. There is no overt bleeding. Bone marrow biopsy is deferred at this time. He was given 1 dose of Retacrit yesterday.  -Follow-up with hematology    Abnormal CT scan suggestive of left pyelonephritis  -he does have a chronic L ureteral stent, it needs to be exchanged soon - urology was messaged at the time of dc  -no fever or leukocytosis noted  -urine cx is negative  -he was treated with antibiotics - 4 days meropenem and 6 days ampicillin     Cerebrovascular disease with possible TIA on hospital day 10  -He has known intracranial stenosis per CTA April 2022  -on 11/3 a rapid response was called at night due to patient being obtunded - blood sugar was 51. He was treated but mentation did not improve as expected. There was concern about LUE weakness, so a stroke alert was called 11/3. BP was very high at that time  -CTA was done and showed severe stenosis of left V1 and right V1, both at origin, severe stenosis of left M2 of MCA, right P2/P3 of PCA, and left V4 vertebral artery. Mod to severe right supraclinoid ICA stenosis also suspected  -He is on aspirin   -Plavix: recommended by [facility], but not ordered due to HGB 6.9 on 11/3  -Statin: continue high dose Lipitor  -BP: [facility] recommended controlling BP  -By chart review he had been off Lyrica since admission. Question of withdrawal from Lyrica led to very high BP and may have in part precipitated this episode, along with the hypoglycemia    History of chronic lacunar infarct in the right external capsule - seen on MRI this admission. Continue ASA and statin.      Metabolic encephalopathic recurrence was noted on hospital day 10  -he had been more confused and agitated the day prior - possibly due to Lyrica withdrawal - Lyrica had been on hold for many days  -the morning of 11/3 he had another rapid response due to being minimally responsive  -HGB came back 6.9 that day - he was transfused  -Lyrica was restarted the evening of hospital day 9 - and his confusion and agitation appears better  -Neurology consult appreciated - outpatient cognitive eval recommended        Other problems        History of prostate cancer that is post radical retropubic prostatectomy (RRP) in 1996 at [facility] by Dr. Cony Beasley  -Artificial urinary sphincter was placed in 2001  -The sphincter was removed in 2013 at [facility] due to infection  -Suprapubic catheter in 2013 was placed when the sphincter was removed    Chronic urinary retention status post suprapubic catheter in 2013  -The catheter was reported to be clogged this admission. Urology exchanged the catheter at the bedside on 7/25    Chronic left hydronephrosis-status post chronic ureteral stent-the stent is exchanged every 3 months. The most recent stent exchange was due earlier in October but was canceled due to anemia. Diabetic this mellitus type II  -Diabetes noted at least since 2011  -A1c is 6.7% this admit  -insulin doses lowered    Hypertension  -His blood pressure was uncontrolled-he had a hypertensive emergency on hospital day #9 and 10  -In part the high blood pressure readings could have been because he was off of Lyrica - may have had some withdrawal  -Nephrology consult appreciated  -Amlodipine dose has been increased from 5 mg once a day to 5 mg twice a day  -Carvedilol dose has been decreased from 12.5 mg twice daily to 6.25 mg twice daily    Chronic kidney disease stage III  -Creatinine the day of discharge is 1.8 compared to 2.1 this admission. It appears that he had a mild acute kidney injury while here. Elevated troponin  -Troponin T level was 0.044 upon admission. This could be due to chronic kidney disease.     Recent fungemia and bacteremia-resulting in a 24-day admission  -He was discharged from that admit on September 21, a little bit over a month prior to this admission  -He had ESBL E. coli bacteremia during that admission due to pyelonephritis and was treated with [facility]. for 14 days ending September 24  -He was also treated with Eraxis for Candida fungemia and was transitioned to Diflucan 400 mg daily through September 27  -He had completed antibiotics at the time of admission    The patient and son expressed appropriate understanding of, and agreement with the discharge recommendations, medications, and plan.      Consults this admission:  IP CONSULT TO PHARMACY  PHARMACY TO CHANGE BASE FLUIDS  IP CONSULT TO IV TEAM  IP CONSULT TO HOSPITALIST  IP CONSULT TO HOSPITALIST  IP CONSULT TO UROLOGY  IP CONSULT TO INFECTIOUS DISEASES  IP CONSULT TO NEUROLOGY  IP CONSULT TO ENDOCRINOLOGY  IP CONSULT TO ONCOLOGY    Discharge Diagnosis:   Hypoglycemia  Encephalopathy      Discharge Instruction:   Follow up appointments: urology, neurology, urology   Primary care physician: Jose García MD within 2 weeks  Diet: diabetic diet   Activity: activity as tolerated  Disposition: Discharged to:   []Home, [][facility], []SNF, []Acute Rehab, []Hospice   Condition on discharge: Stable  Labs and Tests to be Followed up as an outpatient by PCP or Specialist: CBC and BMP    Discharge Medications:        Medication List        START taking these medications      cloNIDine 0.1 MG tablet  Commonly known as: CATAPRES  Take 1 tablet by mouth every 8 hours as needed for High Blood Pressure (give for sbp > 165)     * insulin lispro 100 UNIT/ML Soln injection vial  Commonly known as: HUMALOG  Inject 10 Units into the skin 3 times daily (with meals) Please give Humalog insulin soon AFTER eating the meal. HOLD Humalog if Pt eats <50% of the meal OR Pre meal glucose <150     * insulin lispro 100 UNIT/ML Soln injection vial  Commonly known as: HUMALOG  Inject 0-8 Units into the skin 3 times daily (with meals)  No Insulin  200-249 2 Units  250-299 4 Units  300-349 6 Units  Over 349 8 Units and notify physician     Melatonin 1 MG Caps  Take 1 caplet by mouth at bedtime  Replaces: Melatonin 10 MG Tabs           * This list has 2 medication(s) that are the same as other medications prescribed for you. Read the directions carefully, and ask your doctor or other care provider to review them with you. CHANGE how you take these medications      amLODIPine 5 MG tablet  Commonly known as: NORVASC  Take 1 tablet by mouth 2 times daily  What changed: when to take this     aspirin 81 MG chewable tablet  Take 1 tablet by mouth daily  What changed: when to take this     bisacodyl 10 MG suppository  Commonly known as: DULCOLAX  Place 1 suppository rectally daily as needed for Constipation  What changed:   when to take this  reasons to take this     carvedilol 6.25 MG tablet  Commonly known as: COREG  Take 1 tablet by mouth 2 times daily  What changed:   medication strength  how much to take  when to take this     ferrous sulfate 325 (65 Fe) MG tablet  Commonly known as: IRON 325  Take 1 tablet by mouth every other day  What changed: when to take this     insulin glargine 100 UNIT/ML injection vial  Commonly known as: LANTUS  Inject 15 Units into the skin every morning Hold Lantus Insulin if H. S.blood glucose < 200  What changed:   how much to take  additional instructions     ipratropium-albuterol 0.5-2.5 (3) MG/3ML Soln nebulizer solution  Commonly known as: DUONEB  Inhale 3 mLs into the lungs every 4 hours as needed for Shortness of Breath  What changed:   when to take this  reasons to take this     torsemide 20 MG tablet  Commonly known as: Demadex  Take 1 tablet by mouth daily as needed (Give as needed for edema or weight gain.  Weigh three times a week, give if gains 2 pounds or more) Mon/Wed/Fri  What changed:   when to take this  reasons to take this  additional instructions            CONTINUE taking these medications      acetaminophen 325 MG tablet  Commonly known as: Tylenol  Take 2 tablets by mouth every 6 hours as needed for Pain     allopurinol 100 MG tablet  Commonly known as: ZYLOPRIM     aluminum & magnesium hydroxide-simethicone 200-200-20 MG/5ML Susp suspension  Commonly known as: MAALOX  Take 30 mLs by mouth every 6 hours as needed for Indigestion     atorvastatin 80 MG tablet  Commonly known as: LIPITOR     docusate 100 MG Caps  Commonly known as: COLACE, DULCOLAX  Take 100 mg by mouth 2 times daily as needed for Constipation.      Glucagon Emergency 1 MG Kit     glucose 40 % Gel  Commonly known as: GLUTOSE     levothyroxine 75 MCG tablet  Commonly known as: SYNTHROID     lidocaine 5 %  Commonly known as: LIDODERM     loperamide 2 MG tablet  Commonly known as: IMODIUM A-D     magnesium hydroxide 400 MG/5ML suspension  Commonly known as: MILK OF MAGNESIA     menthol-cetylpyridinium 3 MG lozenge  Commonly known as: CEPACOL     NONFORMULARY     omeprazole 20 MG delayed release capsule  Commonly known as: PRILOSEC     polyethylene glycol 17 g packet  Commonly known as: GLYCOLAX     pregabalin 50 MG capsule  Commonly known as: LYRICA            STOP taking these medications      dextromethorphan-guaiFENesin  MG/5ML syrup  Commonly known as: ROBITUSSIN-DM     lisinopril 5 MG tablet  Commonly known as: PRINIVIL;ZESTRIL     Melatonin 10 MG Tabs  Replaced by: Melatonin 1 MG Caps     methenamine 1 g tablet  Commonly known as: HIPREX     oxybutynin 10 MG extended release tablet  Commonly known as: DITROPAN-XL               Where to Get Your Medications        Information about where to get these medications is not yet available    Ask your nurse or doctor about these medications  amLODIPine 5 MG tablet  bisacodyl 10 MG suppository  carvedilol 6.25 MG tablet  cloNIDine 0.1 MG tablet  ferrous sulfate 325 (65 Fe) MG tablet  insulin glargine 100 UNIT/ML injection vial  insulin lispro 100 UNIT/ML Soln injection vial  insulin lispro 100 UNIT/ML Soln injection vial  ipratropium-albuterol 0.5-2.5 (3) MG/3ML Soln nebulizer solution  Melatonin 1 MG Caps  torsemide 20 MG tablet        Objective Findings at Discharge:   BP (!) 152/66   Pulse 65   Temp 97.5 °F (36.4 °C) (Oral)   Resp 20   Ht 5' 8\" (1.727 m)   Wt 200 lb 9.9 oz (91 kg)   SpO2 100%   BMI 30.50 kg/m²       Physical Exam:   General: NAD  Eyes: EOMI  ENT: neck supple  Cardiovascular: Regular rate. Respiratory: Clear to auscultation  Gastrointestinal: Soft, non tender  Genitourinary: no suprapubic tenderness  Musculoskeletal: No edema  Skin: warm, dry  Neuro: Alert. Psych: Mood appropriate. Labs and Imaging   CT HEAD WO CONTRAST    Result Date: 11/3/2022  EXAMINATION: CT OF THE HEAD WITHOUT CONTRAST  11/3/2022 5:25 am TECHNIQUE: CT of the head was performed without the administration of intravenous contrast. Automated exposure control, iterative reconstruction, and/or weight based adjustment of the mA/kV was utilized to reduce the radiation dose to as low as reasonably achievable. COMPARISON: 10/24/2022 HISTORY: ORDERING SYSTEM PROVIDED HISTORY: AMS, stroke alert TECHNOLOGIST PROVIDED HISTORY: Reason for exam:->AMS, stroke alert Has a \"code stroke\" or \"stroke alert\" been called? ->Yes Reason for Exam: AMS, stroke alert FINDINGS: BRAIN/VENTRICLES: There is no acute intracranial hemorrhage, mass effect or midline shift. No abnormal extra-axial fluid collection. Cortical atrophy and chronic white matter changes in the brain and associated ventricular enlargement are again demonstrated. Calcifications in the basal ganglia again demonstrated. ORBITS: The visualized portion of the orbits demonstrate no acute abnormality. SINUSES: The visualized paranasal sinuses and mastoid air cells demonstrate no acute abnormality. SOFT TISSUES/SKULL:  No acute abnormality of the visualized skull or soft tissues. Chronic findings in the brain without acute CT abnormality identified.      XR CHEST PORTABLE    Result Date: 11/4/2022  EXAMINATION: ONE XRAY VIEW OF THE CHEST 11/4/2022 4:41 am COMPARISON: 10/24/2022 HISTORY: ORDERING SYSTEM PROVIDED HISTORY: SOB TECHNOLOGIST PROVIDED HISTORY: Reason for exam:->SOB Reason for Exam: SOB FINDINGS: The cardiomediastinal silhouette is not enlarged. No pleural effusion or pneumothorax. No focal consolidation. No acute cardiopulmonary abnormality. CTA HEAD NECK W CONTRAST    Result Date: 11/3/2022  EXAMINATION: CTA OF THE HEAD AND NECK WITH CONTRAST 11/3/2022 5:38 am: TECHNIQUE: CTA of the head and neck was performed with the administration of intravenous contrast. Multiplanar reformatted images are provided for review. MIP images are provided for review. Stenosis of the internal carotid arteries measured using NASCET criteria. Automated exposure control, iterative reconstruction, and/or weight based adjustment of the mA/kV was utilized to reduce the radiation dose to as low as reasonably achievable. COMPARISON: CTA head and neck 04/18/2022. HISTORY: ORDERING SYSTEM PROVIDED HISTORY: AMS, suspected LUE weakness TECHNOLOGIST PROVIDED HISTORY: Reason for exam:->AMS, suspected LUE weakness Has a \"code stroke\" or \"stroke alert\" been called? ->Yes Reason for Exam: AMS, suspected LUE weakness FINDINGS: CTA NECK: AORTIC ARCH/ARCH VESSELS: No dissection or arterial injury. No significant stenosis of the brachiocephalic or subclavian arteries. CAROTID ARTERIES: No dissection, arterial injury, or hemodynamically significant stenosis by NASCET criteria. Retropharyngeal course of both common carotid arteries. VERTEBRAL ARTERIES: Severe stenosis of the left V1 vertebral artery origin, unchanged. Moderate stenosis of the right V1 vertebral artery origin, unchanged. No dissection, arterial injury. SOFT TISSUES: The lung apices are clear. No cervical or superior mediastinal lymphadenopathy. The larynx and pharynx are unremarkable.   No acute abnormality of the salivary and thyroid glands. BONES: No acute osseous abnormality. C3-C7 posterior fusion with laminectomies. No signs of hardware failure loosening. CTA HEAD: ANTERIOR CIRCULATION: Moderate to severe stenosis of the right supraclinoid ICA, unchanged. Focal severe stenosis of a left M2 branch, unchanged. Focal moderate stenosis at the right M1/M2 bifurcation, unchanged. Focal moderate stenosis of the right A2, unchanged. Hypoplastic right A1.  1-2 mm supraclinoid right ICA aneurysm versus infundibulum, unchanged. POSTERIOR CIRCULATION: Severe stenosis left V4 vertebral artery, unchanged. Severe stenosis right P2 and right P3, unchanged. No significant stenosis of the basilar artery. No aneurysm. Right vertebral artery dominant. OTHER: No dural venous sinus thrombosis on this non-dedicated study. BRAIN: No mass effect or midline shift. No extra-axial fluid collection. The gray-white differentiation is maintained. Stable CTA head/neck compared to prior. Cta neck: Severe stenosis of the left V1 cervical vertebral artery origin, unchanged. Moderate stenosis of the right V1 cervical vertebral artery origin, unchanged. Cta head: Focal severe stenosis of a left M2 branch MCA, right P2/P3 PCA, and left V4 vertebral artery, unchanged. Moderate to severe stenosis right supraclinoid ICA, unchanged. Other areas of left severe stenosis discussed above, unchanged. MRI BRAIN WO CONTRAST    Result Date: 11/3/2022  EXAMINATION: MRI OF THE BRAIN WITHOUT CONTRAST  11/3/2022 3:34 pm TECHNIQUE: Multiplanar multisequence MRI of the brain was performed without the administration of intravenous contrast. COMPARISON: None.  HISTORY: ORDERING SYSTEM PROVIDED HISTORY: L sided weakness r/o stroke TECHNOLOGIST PROVIDED HISTORY: Reason for exam:->L sided weakness r/o stroke What is the sedation requirement?->None Reason for Exam: Hypoglycemia Additional signs and symptoms: L sided weakness r/o stroke Relevant Medical/Surgical History: none Initial evaluation. FINDINGS: INTRACRANIAL STRUCTURES/VENTRICLES: There is no acute infarct. A chronic lacunar infarct is seen along the right external capsule. No mass effect or midline shift. No evidence of an acute intracranial hemorrhage. Areas of T2 FLAIR hyperintensity are seen in the periventricular and subcortical white matter, which are nonspecific, but may represent chronic microvascular ischemic change. There is mild-to-moderate global parenchymal volume loss. Otherwise, the ventricles and sulci are normal in size and configuration. The sellar/suprasellar regions appear unremarkable. The normal signal voids within the major intracranial vessels appear maintained. ORBITS: The visualized portion of the orbits demonstrate no acute abnormality. SINUSES: The visualized paranasal sinuses and mastoid air cells demonstrate no acute abnormality. BONES/SOFT TISSUES: The bone marrow signal intensity appears normal. The soft tissues demonstrate no acute abnormality. 1. No acute intracranial abnormality. No acute infarct. 2. Mild-to-moderate global parenchymal volume loss with mild-to-moderate chronic microvascular ischemic changes. 3. Chronic lacunar infarct along the right external capsule. CBC:   Recent Labs     11/03/22  0936 11/03/22  1314 11/03/22  2157 11/04/22  0510   WBC 8.9 UNABLE TO PERFORM TESTING: SPECIMEN CLOTTED  --  6.7   HGB 6.9* UNABLE TO PERFORM TESTING: SPECIMEN CLOTTED 8.4* 9.1*    UNABLE TO PERFORM TESTING: SPECIMEN CLOTTED  --  225     BMP:    Recent Labs     11/03/22  0936 11/04/22  0510    141   K 3.9 4.6    106   CO2 23 22   BUN 34* 29*   CREATININE 1.8* 1.8*   GLUCOSE 237* 186*     Hepatic: No results for input(s): AST, ALT, ALB, BILITOT, ALKPHOS in the last 72 hours.   Lipids: No results found for: CHOL, HDL, TRIG  Hemoglobin A1C:   Lab Results   Component Value Date/Time    LABA1C 6.0 11/03/2022 01:14 PM     TSH: No results found for: TSH  Troponin: Lab Results   Component Value Date/Time    TROPONINT 0.044 10/24/2022 12:20 PM    TROPONINT 0.030 04/20/2022 07:30 AM    TROPONINT 0.040 04/18/2022 09:51 AM     Lactic Acid: No results for input(s): LACTA in the last 72 hours. BNP: No results for input(s): PROBNP in the last 72 hours.   UA:  Lab Results   Component Value Date/Time    NITRU NEGATIVE 10/24/2022 11:00 AM    NITRU NEGATIVE 03/23/2013 02:24 PM    COLORU YELLOW 10/24/2022 11:00 AM    WBCUA 36 10/24/2022 11:00 AM    RBCUA 59 10/24/2022 11:00 AM    MUCUS RARE 10/24/2022 11:00 AM    TRICHOMONAS NONE SEEN 10/24/2022 11:00 AM    YEAST FEW 04/06/2022 04:45 PM    BACTERIA NEGATIVE 10/24/2022 11:00 AM    CLARITYU CLEAR 10/24/2022 11:00 AM    SPECGRAV 1.015 10/24/2022 11:00 AM    LEUKOCYTESUR LARGE 10/24/2022 11:00 AM    UROBILINOGEN 0.2 10/24/2022 11:00 AM    BILIRUBINUR NEGATIVE 10/24/2022 11:00 AM    BLOODU MODERATE 10/24/2022 11:00 AM    GLUCOSEU 1,000 03/23/2013 02:24 PM    KETUA NEGATIVE 10/24/2022 11:00 AM    AMORPHOUS RARE 11/16/2021 12:28 PM     Time Spent Discharging patient 45 minutes    Electronically signed by Araseli Flores MD on 11/5/2022 at 2:13 PM

## 2022-11-05 NOTE — PROGRESS NOTES
Neurology Service Progress Note  [address]   Patient Name: Yuki Joyner  : 1940        Subjective:   CC: Altered Mental Status  Chart was reviewed in detail. Patient was seen and assessed. He is sitting up in the bed. Finished breakfast and he was able to recall what he ate. He answered all orientation questions appropriately. Denies needs. Past Medical History:   Diagnosis Date    Acute urinary tract infection 3/15/2012    Ataxia 2010    Diabetes mellitus ([address]     type 2, controlled    Fusion of spine of cervical region 10/2012    Gait disturbance     History of prostate cancer 1996    adenocarcinoma    History of tobacco use     Hyperlipidemia     Osteoarthritis 2011    :   Past Surgical History:   Procedure Laterality Date    BLADDER SURGERY      BLADDER SURGERY Left 2022    CYSTOSCOPY LEFT STENT EXCHANGE performed by Herman Wiggins MD at [address] [facility] Left 2022    LEFT CYSTOSCOPY URETERAL STENT EXCHANGE AND [address] [facility] Lor[facility]imlich[facility] [address] [facility] [address] [facility]  [facility]    Cysto with removal of artificial sphinter and placement of suprapubic catheter.     PROSTATE SURGERY      PROSTATECTOMY      infection - had cancer     Medications:  Scheduled Meds:   insulin lispro  0-8 Units SubCUTAneous TID WC    insulin lispro  0-8 Units SubCUTAneous 2 times per day    insulin lispro  10 Units SubCUTAneous TID WC    insulin glargine  15 Units SubCUTAneous Nightly    heparin (porcine)  5,000 Units SubCUTAneous 3 times per day    atorvastatin  80 mg Oral Nightly    amLODIPine  5 mg Oral BID    pregabalin  50 mg Oral BID    pantoprazole  40 mg Oral QAM AC    sodium chloride flush  5-40 mL IntraVENous 2 times per day    dextrose bolus  250 mL IntraVENous Once    carvedilol  6.25 mg Oral BID WC    ferrous sulfate  325 mg Oral Daily with breakfast    levothyroxine  75 mcg Oral Daily    aspirin  81 mg Oral Nightly     Continuous Infusions:   sodium chloride      sodium chloride 75 mL/hr at 22    dextrose       PRN Meds:.labetalol, hydrALAZINE, sodium chloride, cloNIDine, dextrose bolus **OR** dextrose bolus, sodium chloride flush, sodium chloride, ondansetron **OR** ondansetron, polyethylene glycol, acetaminophen **OR** acetaminophen, glucose, dextrose bolus **OR** dextrose bolus, glucagon (rDNA), dextrose    No Known Allergies  Social History     Socioeconomic History    Marital status:      Spouse name: Not on file    Number of children: 3    Years of education: Not on file    Highest education level: Not on file   Occupational History    Not on file   Tobacco Use    Smoking status: Former     Packs/day: 0.50     Types: Cigarettes     Quit date: 1976     Years since quittin.3    Smokeless tobacco: Never   Vaping Use    Vaping Use: Not on file   Substance and Sexual Activity    Alcohol use: No     Comment: Quit in     Drug use: No    Sexual activity: Not on file   Other Topics Concern    Not on file   Social History Narrative    Not on file     Social Determinants of Health     Financial Resource Strain: Not on file   Food Insecurity: Not on file   Transportation Needs: Not on file   Physical Activity: Not on file   Stress: Not on file   Social Connections: Not on file   Intimate Partner Violence: Not on file   Housing Stability: Not on file      Family History   Problem Relation Age of Onset    Cancer Mother     Diabetes Father     Heart Disease Father     High Blood Pressure Father     Diabetes Sister     High Blood Pressure Sister     Cancer Brother         prostate, breast    Diabetes Brother     Cancer Maternal Uncle     Diabetes Maternal Grandmother     Stroke Maternal Grandfather          Physical Exam:       Wt Readings from Last 3 Encounters:   22 200 lb 9.9 oz (91 kg)   22 206 lb 9.1 oz (93.7 kg)   22 197 lb 12.8 oz (89.7 kg) Temp Readings from Last 3 Encounters:   11/05/22 98.2 °F (36.8 °C) (Oral)   10/05/22 97 °F (36.1 °C) (Infrared)   09/21/22 98.2 °F (36.8 °C)     BP Readings from Last 3 Encounters:   11/05/22 (!) 147/68   10/05/22 118/61   09/21/22 (!) 143/74     Pulse Readings from Last 3 Encounters:   11/05/22 65   10/05/22 59   09/21/22 69        Gen: A&O x 4, NAD, cooperative, alert, underlying confusion  HEENT: NC/AT, EOMI, PERRL, mmm,  neck supple, no meningeal signs; Heart: SB on monitor  Lungs: Respirations even and  unlabored  Ext: no edema, no calf tenderness b/l  Psych: normal mood and affect, drowsy, hallucinations  Skin: no rashes or lesions    NEUROLOGIC EXAM:    Mental Status: A&O to self, month and year, location NAD, speech clear/slurred and voice soft, language fluent, repetition and naming intact, follows commands appropriately. Able to do simple math calculation    Cranial Nerve Exam:   CN II-XII:  PERRL, VFF, no nystagmus, no gaze paresis, sensation V1-V3 intact b/l, muscles of facial expression symmetric; hearing intact to conversational tone, palate elevates symmetrically, shoulder elevation symmetric and tongue protrudes midline with movement side to side.     Motor Exam:       Strength 5/5 UE's/LE's b/l  Tone and bulk normal   No pronator drift    Deep Tendon Reflexes: 2/4 biceps, triceps, brachioradialis, patellar, and achilles b/l; flexor plantar responses b/l    Sensation: Intact light touch/pinprick/vibration UE's/LE's b/l    Coordination/Cerebellum:       Tremors--none       Gait and stance:      Gait: deferred      LABS:     Recent Labs     11/03/22  0936 11/03/22  1314 11/04/22  0510   WBC 8.9 UNABLE TO PERFORM TESTING: SPECIMEN CLOTTED 6.7     --  141   K 3.9  --  4.6     --  106   CO2 23  --  22   BUN 34*  --  29*   CREATININE 1.8*  --  1.8*   GLUCOSE 237*  --  186*         IMAGING:    CT head w/o contrast:  Impression   Chronic findings in the brain without acute CT abnormality identified. CTA head and neck:  Impression   Stable CTA head/neck compared to prior. 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 Moderate stenosis of the right V1 cervical vertebral artery origin, unchanged. Cta head:       Focal severe stenosis of a left M2 branch MCA, right P2/P3 PCA, and left V4   vertebral artery, unchanged. Moderate to severe stenosis right supraclinoid   ICA, unchanged. Other areas of left severe stenosis discussed above,   unchanged. MRI brain w/o contrast:  Impression   1. No acute intracranial abnormality. No acute infarct. 2. Mild-to-moderate global parenchymal volume loss with mild-to-moderate   chronic microvascular ischemic changes. 3. Chronic lacunar infarct along the right external capsule. All imaging personally reviewed. ASSESSMENT/PLAN:   78year old male presenting with hypoglycemia and altered mental status. He is alert and oriented for me this morning. Answering all questions appropriately. Non focal exam.     Altered mental status due to toxic metabolic encephalopathy superimposed on HTN, hypoglycemia, anemia, UTI, bacteremia and likely delirium. Improved  Neuro imaging as above - noting chronic stenosis of the left MCA, right PCA left vertebral artery   MRI brain w/o contrast completed noting chronic lacunar infarct along the right external capsule, no acute stroke  Continue Plavix and aspirin for 30 days then monotherapy with aspirin alone per [facility] recs  SBP goals 140-180 per [facility] recs  BUN 29/Creatinine 1.8, TSH 5.1, Hgb 8.4 - management per IM  Blood cultures 11/1 NG at 48 hours  Delirium precautions - Promote being awake during the day time hours with lights on, blinds open. Reorient as needed. Minimize waking patient from sleep during night time  hours as able. PT/OT as recommended  Follow up in office for cognitive evaluation  Nothing further from our standpoint.  We will sign off.      > 20 minutes of time spent included chart review, obtaining history, patient examination, developing plan of care, and documentation. Patient was discussed with attending neurologist Dr. Mat Benítez. Thank you for allowing us to participate in the care of your patient. If there are any questions regarding evaluation please feel free to contact us.        Anthony Romo, APRN - CNS 11/5/2022 9:00 AM

## 2022-11-05 NOTE — PROGRESS NOTES
Progress Note( Dr. Fabrice Fatima)  11/5/2022  Subjective:   Admit Date: 10/24/2022  PCP: Adrienne Lagunas MD    Admitted For :Altered mental State he was at [facility] For:   Better control of blood glucose. Patient is having hypoglycemic episodes      Interval History: Patient is more alert and awake patient again is confused this morning and conversation    Has anemia with hemoglobin. Latest number is 9.1    Denies any chest pains,   Denies SOB . Denies nausea or vomiting. Trying to eat better  No new bowel or bladder symptoms. Intake/Output Summary (Last 24 hours) at 11/5/2022 1134  Last data filed at 11/4/2022 2245  Gross per 24 hour   Intake --   Output 550 ml   Net -550 ml         DATA    CBC:   Recent Labs     11/03/22  0936 11/03/22  1314 11/03/22  2157 11/04/22  0510   WBC 8.9 UNABLE TO PERFORM TESTING: SPECIMEN CLOTTED  --  6.7   HGB 6.9* UNABLE TO PERFORM TESTING: SPECIMEN CLOTTED 8.4* 9.1*    UNABLE TO PERFORM TESTING: SPECIMEN CLOTTED  --  225      CMP:  Recent Labs     11/03/22  0936 11/04/22  0510    141   K 3.9 4.6    106   CO2 23 22   BUN 34* 29*   CREATININE 1.8* 1.8*   CALCIUM 8.8 9.0   LABALBU  --  3.2*       Lipids: No results found for: CHOL, HDL, TRIG  Glucose:  Recent Labs     11/05/22  0306 11/05/22  0620 11/05/22  1117   POCGLU 213* 183* 175*       [id number]:  Lab Results   Component Value Date/Time    LABA1C 6.0 11/03/2022 01:14 PM     High Sensitivity TSH:   Lab Results   Component Value Date/Time    TSHHS 5.100 11/02/2022 04:35 AM     Free T3: No results found for: FT3  Free T4:  Lab Results   Component Value Date/Time    T4FREE 1.48 11/03/2022 09:36 AM       XR CHEST PORTABLE   Final Result   No acute cardiopulmonary abnormality. MRI BRAIN WO CONTRAST   Final Result   1. No acute intracranial abnormality. No acute infarct.    2. Mild-to-moderate global parenchymal volume loss with mild-to-moderate   chronic microvascular ischemic changes. 3. Chronic lacunar infarct along the right external capsule. CTA HEAD NECK W CONTRAST   Final Result   Stable CTA head/neck compared to prior. 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 Moderate stenosis of the right V1 cervical vertebral artery origin, unchanged. Cta head:      Focal severe stenosis of a left M2 branch MCA, right P2/P3 PCA, and left V4   vertebral artery, unchanged. Moderate to severe stenosis right supraclinoid   ICA, unchanged. Other areas of left severe stenosis discussed above,   unchanged. CT HEAD WO CONTRAST   Final Result   Chronic findings in the brain without acute CT abnormality identified. CT CHEST ABDOMEN PELVIS W CONTRAST Additional Contrast? None   Final Result      Negative for pneumonia. Mild bibasilar opacities which are most likely   atelectasis. Small fixed hiatus hernia. Abdomen/Pelvis:      Liver: Liver is normal density. No enhancing masses. Normal enhancement of   the intrahepatic vasculature. Spleen:  Normal size. No enhancing masses calcified granulomata in the   spleen. Pancreas: No enhancing masses. No ductal dilation. No adjacent fatty   stranding. Gallbladder no calcified stones or sludge. No pericholecystic fluid. No   wall thickening. Bile ducts: No biliary ductal dilation      Adrenals: The adrenal glands are unremarkable      Kidneys: Urinary tract stent on the left. The left kidney is mildly   atrophic. Perinephric stranding on the left and mild decreased enhancement   of the cortex when compared to the right. However, no hydronephrosis. No   large mass. Heterogeneity of the contrast enhancement on the left. Left   urinary tract stent extends into the bladder. No stones along the pathway of   the left ureter. GI: No small bowel dilation. No colonic wall thickening. No large mass.    The stomach is unremarkable in appearance but it is underdistended. Moderate   amount of stool throughout the colon. Mesentery: No enlarged lymphadenopathy. No free fluid. No free gas. Aorta: Aorta is of normal size. Negative for dissection. IVC is   unremarkable. Celiac axis and SMA are patent. Portal vein is patent. Atherosclerotic change of the aorta in the renal arteries. PELVIS      GI: No small bowel dilation. No colonic wall thickening. No enlarged   lymphadenopathy. Small amount of free fluid in the pelvis. : The bladder is unremarkable in appearance. No large mass. Suprapubic   catheter. The bladder is completely decompressed. Prostate appears to have   been resected. Phleboliths in the pelvis. Multiple clips in the pelvis. No   enlarged lymphadenopathy. Osseous: Bone island in the right ilium. No lytic destructive lesions. Bone   island in the right acetabulum also unchanged. IMPRESSION:   1. Left urinary tract stent is in place. Probable pyelonephritis of the left   kidney. CT HEAD WO CONTRAST   Final Result   No acute intracranial abnormality. XR CHEST PORTABLE   Final Result   1. No active pulmonary disease.               Scheduled Medicines   Medications:    insulin lispro  0-8 Units SubCUTAneous TID     insulin lispro  0-8 Units SubCUTAneous 2 times per day    insulin lispro  10 Units SubCUTAneous TID     insulin glargine  15 Units SubCUTAneous Nightly    heparin (porcine)  5,000 Units SubCUTAneous 3 times per day    atorvastatin  80 mg Oral Nightly    amLODIPine  5 mg Oral BID    pregabalin  50 mg Oral BID    pantoprazole  40 mg Oral QAM AC    sodium chloride flush  5-40 mL IntraVENous 2 times per day    dextrose bolus  250 mL IntraVENous Once    carvedilol  6.25 mg Oral BID WC    ferrous sulfate  325 mg Oral Daily with breakfast    levothyroxine  75 mcg Oral Daily    aspirin  81 mg Oral Nightly      Infusions:    sodium chloride      sodium Metabolic encephalopathy     History of prostate cancer     Cigarette nicotine dependence without complication     Altered mental status     Serratia marcescens infection     Hypoglycemia     Hospital discharge follow-up     Fungemia     Pyelonephritis     Bacteremia due to Enterococcus      Plan:     Reviewed POC blood glucose . Labs and X ray results   Reviewed Current Medicines   On meal/ Correction bolus Humalog/ Basal Lantus Insulin regime /  Monitor Blood glucose frequently   Modified  the dose of Insulin/ other medicines as needed   Will follow     .      Ewelina Rao MD, MD

## 2022-11-05 NOTE — NURSE NAVIGATOR
Tired to call Joyn Roche to let he know pt had been transported to [facility] but went to voicemail and couldn't leave message.  Called son ana and let him know pt was being transfer

## 2022-11-06 LAB
CULTURE: NORMAL
CULTURE: NORMAL
Lab: NORMAL
Lab: NORMAL
SPECIMEN: NORMAL
SPECIMEN: NORMAL

## 2022-11-07 ENCOUNTER — HOSPITAL ENCOUNTER (OUTPATIENT)
Age: 82
Setting detail: SPECIMEN
Discharge: HOME | End: 2022-11-07

## 2022-11-07 LAB
ALBUMIN SERPL-MCNC: 3.2 GM/DL (ref 3.4–5)
ALP BLD-CCNC: 84 IU/L (ref 40–128)
ALT SERPL-CCNC: 8 U/L (ref 10–40)
ANION GAP SERPL CALCULATED.3IONS-SCNC: 12 MMOL/L (ref 4–16)
AST SERPL-CCNC: 12 IU/L (ref 15–37)
BILIRUB SERPL-MCNC: 0.2 MG/DL (ref 0–1)
BUN BLDV-MCNC: 37 MG/DL (ref 6–23)
CALCIUM SERPL-MCNC: 8.8 MG/DL (ref 8.3–10.6)
CHLORIDE BLD-SCNC: 109 MMOL/L (ref 99–110)
CO2: 20 MMOL/L (ref 21–32)
CREAT SERPL-MCNC: 2.1 MG/DL (ref 0.9–1.3)
GFR SERPL CREATININE-BSD FRML MDRD: 30 ML/MIN/1.73M2
GLUCOSE BLD-MCNC: 210 MG/DL (ref 70–99)
HCT VFR BLD CALC: 27.3 % (ref 42–52)
HEMOGLOBIN: 8.6 GM/DL (ref 13.5–18)
MCH RBC QN AUTO: 30.3 PG (ref 27–31)
MCHC RBC AUTO-ENTMCNC: 31.5 % (ref 32–36)
MCV RBC AUTO: 96.1 FL (ref 78–100)
PDW BLD-RTO: 16.6 % (ref 11.7–14.9)
PLATELET # BLD: 225 K/CU MM (ref 140–440)
PMV BLD AUTO: 12.4 FL (ref 7.5–11.1)
POTASSIUM SERPL-SCNC: 5.1 MMOL/L (ref 3.5–5.1)
RBC # BLD: 2.84 M/CU MM (ref 4.6–6.2)
SODIUM BLD-SCNC: 141 MMOL/L (ref 135–145)
TOTAL PROTEIN: 5.5 GM/DL (ref 6.4–8.2)
WBC # BLD: 7.8 K/CU MM (ref 4–10.5)

## 2022-11-07 PROCEDURE — 80053 COMPREHEN METABOLIC PANEL: CPT

## 2022-11-07 PROCEDURE — 36415 COLL VENOUS BLD VENIPUNCTURE: CPT

## 2022-11-07 PROCEDURE — 85027 COMPLETE CBC AUTOMATED: CPT

## 2022-11-10 ENCOUNTER — HOSPITAL ENCOUNTER (OUTPATIENT)
Age: 82
Setting detail: SPECIMEN
Discharge: HOME | End: 2022-11-10

## 2022-11-10 LAB
ANION GAP SERPL CALCULATED.3IONS-SCNC: 11 MMOL/L (ref 4–16)
BUN BLDV-MCNC: 55 MG/DL (ref 6–23)
CALCIUM SERPL-MCNC: 8.9 MG/DL (ref 8.3–10.6)
CHLORIDE BLD-SCNC: 110 MMOL/L (ref 99–110)
CO2: 20 MMOL/L (ref 21–32)
CREAT SERPL-MCNC: 2 MG/DL (ref 0.9–1.3)
ESTIMATED AVERAGE GLUCOSE: 143 MG/DL
GFR SERPL CREATININE-BSD FRML MDRD: 32 ML/MIN/1.73M2
GLUCOSE BLD-MCNC: 219 MG/DL (ref 70–99)
HBA1C MFR BLD: 6.6 % (ref 4.2–6.3)
HCT VFR BLD CALC: 26.4 % (ref 42–52)
HEMOGLOBIN: 8.1 GM/DL (ref 13.5–18)
MCH RBC QN AUTO: 30.3 PG (ref 27–31)
MCHC RBC AUTO-ENTMCNC: 30.7 % (ref 32–36)
MCV RBC AUTO: 98.9 FL (ref 78–100)
PDW BLD-RTO: 17.2 % (ref 11.7–14.9)
PLATELET # BLD: 214 K/CU MM (ref 140–440)
PMV BLD AUTO: 12.5 FL (ref 7.5–11.1)
POTASSIUM SERPL-SCNC: 5.5 MMOL/L (ref 3.5–5.1)
RBC # BLD: 2.67 M/CU MM (ref 4.6–6.2)
SODIUM BLD-SCNC: 141 MMOL/L (ref 135–145)
TSH HIGH SENSITIVITY: 2.02 UIU/ML (ref 0.27–4.2)
WBC # BLD: 7.7 K/CU MM (ref 4–10.5)

## 2022-11-10 PROCEDURE — 84443 ASSAY THYROID STIM HORMONE: CPT

## 2022-11-10 PROCEDURE — 80048 BASIC METABOLIC PNL TOTAL CA: CPT

## 2022-11-10 PROCEDURE — 36415 COLL VENOUS BLD VENIPUNCTURE: CPT

## 2022-11-10 PROCEDURE — 83036 HEMOGLOBIN GLYCOSYLATED A1C: CPT

## 2022-11-10 PROCEDURE — 85027 COMPLETE CBC AUTOMATED: CPT

## 2022-11-14 ENCOUNTER — HOSPITAL ENCOUNTER (OUTPATIENT)
Age: 82
Setting detail: SPECIMEN
Discharge: HOME | End: 2022-11-14
Payer: MEDICARE

## 2022-11-14 LAB
ANION GAP SERPL CALCULATED.3IONS-SCNC: 5 MMOL/L (ref 4–16)
BUN BLDV-MCNC: 51 MG/DL (ref 6–23)
CALCIUM SERPL-MCNC: 8.8 MG/DL (ref 8.3–10.6)
CHLORIDE BLD-SCNC: 107 MMOL/L (ref 99–110)
CO2: 21 MMOL/L (ref 21–32)
CREAT SERPL-MCNC: 2.3 MG/DL (ref 0.9–1.3)
GFR SERPL CREATININE-BSD FRML MDRD: 27 ML/MIN/1.73M2
GLUCOSE BLD-MCNC: 223 MG/DL (ref 70–99)
HCT VFR BLD CALC: 26.5 % (ref 42–52)
HEMOGLOBIN: 8.2 GM/DL (ref 13.5–18)
MCH RBC QN AUTO: 30.1 PG (ref 27–31)
MCHC RBC AUTO-ENTMCNC: 30.9 % (ref 32–36)
MCV RBC AUTO: 97.4 FL (ref 78–100)
PDW BLD-RTO: 17 % (ref 11.7–14.9)
PLATELET # BLD: 208 K/CU MM (ref 140–440)
PMV BLD AUTO: 12.2 FL (ref 7.5–11.1)
POTASSIUM SERPL-SCNC: 5.4 MMOL/L (ref 3.5–5.1)
RBC # BLD: 2.72 M/CU MM (ref 4.6–6.2)
SODIUM BLD-SCNC: 133 MMOL/L (ref 135–145)
WBC # BLD: 6.7 K/CU MM (ref 4–10.5)

## 2022-11-14 PROCEDURE — 85027 COMPLETE CBC AUTOMATED: CPT

## 2022-11-14 PROCEDURE — 80048 BASIC METABOLIC PNL TOTAL CA: CPT

## 2022-11-14 PROCEDURE — 36415 COLL VENOUS BLD VENIPUNCTURE: CPT

## 2022-11-17 ENCOUNTER — HOSPITAL ENCOUNTER (OUTPATIENT)
Age: 82
Setting detail: SPECIMEN
Discharge: HOME | End: 2022-11-17

## 2022-11-17 LAB
ANION GAP SERPL CALCULATED.3IONS-SCNC: 11 MMOL/L (ref 4–16)
BUN BLDV-MCNC: 53 MG/DL (ref 6–23)
CALCIUM SERPL-MCNC: 8.6 MG/DL (ref 8.3–10.6)
CHLORIDE BLD-SCNC: 109 MMOL/L (ref 99–110)
CO2: 24 MMOL/L (ref 21–32)
CREAT SERPL-MCNC: 1.9 MG/DL (ref 0.9–1.3)
GFR SERPL CREATININE-BSD FRML MDRD: 34 ML/MIN/1.73M2
GLUCOSE BLD-MCNC: 147 MG/DL (ref 70–99)
HCT VFR BLD CALC: 24.6 % (ref 42–52)
HEMOGLOBIN: 7.8 GM/DL (ref 13.5–18)
MCH RBC QN AUTO: 30.6 PG (ref 27–31)
MCHC RBC AUTO-ENTMCNC: 31.7 % (ref 32–36)
MCV RBC AUTO: 96.5 FL (ref 78–100)
PDW BLD-RTO: 16.9 % (ref 11.7–14.9)
PLATELET # BLD: 208 K/CU MM (ref 140–440)
PMV BLD AUTO: 11.9 FL (ref 7.5–11.1)
POTASSIUM SERPL-SCNC: 5.1 MMOL/L (ref 3.5–5.1)
RBC # BLD: 2.55 M/CU MM (ref 4.6–6.2)
SODIUM BLD-SCNC: 144 MMOL/L (ref 135–145)
WBC # BLD: 6.6 K/CU MM (ref 4–10.5)

## 2022-11-17 PROCEDURE — 80048 BASIC METABOLIC PNL TOTAL CA: CPT

## 2022-11-17 PROCEDURE — 36415 COLL VENOUS BLD VENIPUNCTURE: CPT

## 2022-11-17 PROCEDURE — 85027 COMPLETE CBC AUTOMATED: CPT

## 2022-11-21 ENCOUNTER — HOSPITAL ENCOUNTER (OUTPATIENT)
Age: 82
Setting detail: SPECIMEN
Discharge: HOME | End: 2022-11-21

## 2022-11-21 LAB
ANION GAP SERPL CALCULATED.3IONS-SCNC: 11 MMOL/L (ref 4–16)
BUN BLDV-MCNC: 56 MG/DL (ref 6–23)
CALCIUM SERPL-MCNC: 8.6 MG/DL (ref 8.3–10.6)
CHLORIDE BLD-SCNC: 108 MMOL/L (ref 99–110)
CO2: 22 MMOL/L (ref 21–32)
CREAT SERPL-MCNC: 2.3 MG/DL (ref 0.9–1.3)
GFR SERPL CREATININE-BSD FRML MDRD: 27 ML/MIN/1.73M2
GLUCOSE BLD-MCNC: 180 MG/DL (ref 70–99)
HCT VFR BLD CALC: 28.1 % (ref 42–52)
HEMOGLOBIN: 8.8 GM/DL (ref 13.5–18)
MCH RBC QN AUTO: 29.9 PG (ref 27–31)
MCHC RBC AUTO-ENTMCNC: 31.3 % (ref 32–36)
MCV RBC AUTO: 95.6 FL (ref 78–100)
PDW BLD-RTO: 16.4 % (ref 11.7–14.9)
PLATELET # BLD: 210 K/CU MM (ref 140–440)
PMV BLD AUTO: 11.9 FL (ref 7.5–11.1)
POTASSIUM SERPL-SCNC: 5.6 MMOL/L (ref 3.5–5.1)
RBC # BLD: 2.94 M/CU MM (ref 4.6–6.2)
SODIUM BLD-SCNC: 141 MMOL/L (ref 135–145)
WBC # BLD: 6.4 K/CU MM (ref 4–10.5)

## 2022-11-21 PROCEDURE — 36415 COLL VENOUS BLD VENIPUNCTURE: CPT

## 2022-11-21 PROCEDURE — 85027 COMPLETE CBC AUTOMATED: CPT

## 2022-11-21 PROCEDURE — 80048 BASIC METABOLIC PNL TOTAL CA: CPT

## 2022-11-23 ENCOUNTER — HOSPITAL ENCOUNTER (OUTPATIENT)
Age: 82
Setting detail: SPECIMEN
Discharge: HOME | End: 2022-11-23

## 2022-11-23 LAB
HCT VFR BLD CALC: 29.5 % (ref 42–52)
HEMOGLOBIN: 9 GM/DL (ref 13.5–18)
MCH RBC QN AUTO: 31 PG (ref 27–31)
MCHC RBC AUTO-ENTMCNC: 30.5 % (ref 32–36)
MCV RBC AUTO: 101.7 FL (ref 78–100)
PDW BLD-RTO: 16.5 % (ref 11.7–14.9)
PLATELET # BLD: 155 K/CU MM (ref 140–440)
PMV BLD AUTO: 13 FL (ref 7.5–11.1)
RBC # BLD: 2.9 M/CU MM (ref 4.6–6.2)
WBC # BLD: 7 K/CU MM (ref 4–10.5)

## 2022-11-23 PROCEDURE — 36415 COLL VENOUS BLD VENIPUNCTURE: CPT

## 2022-11-23 PROCEDURE — 80048 BASIC METABOLIC PNL TOTAL CA: CPT

## 2022-11-23 PROCEDURE — 85027 COMPLETE CBC AUTOMATED: CPT

## 2022-11-24 ENCOUNTER — HOSPITAL ENCOUNTER (OUTPATIENT)
Age: 82
Setting detail: SPECIMEN
Discharge: HOME | End: 2022-11-24
Payer: MEDICARE

## 2022-11-24 LAB
ANION GAP SERPL CALCULATED.3IONS-SCNC: 9 MMOL/L (ref 4–16)
BUN BLDV-MCNC: 58 MG/DL (ref 6–23)
CALCIUM SERPL-MCNC: 8.5 MG/DL (ref 8.3–10.6)
CHLORIDE BLD-SCNC: 103 MMOL/L (ref 99–110)
CO2: 22 MMOL/L (ref 21–32)
CREAT SERPL-MCNC: 2.4 MG/DL (ref 0.9–1.3)
GFR SERPL CREATININE-BSD FRML MDRD: 26 ML/MIN/1.73M2
GLUCOSE BLD-MCNC: 248 MG/DL (ref 70–99)
POTASSIUM SERPL-SCNC: 5.4 MMOL/L (ref 3.5–5.1)
SODIUM BLD-SCNC: 134 MMOL/L (ref 135–145)

## 2022-11-24 PROCEDURE — 36415 COLL VENOUS BLD VENIPUNCTURE: CPT

## 2022-11-24 PROCEDURE — 80048 BASIC METABOLIC PNL TOTAL CA: CPT

## 2022-11-25 ENCOUNTER — HOSPITAL ENCOUNTER (OUTPATIENT)
Age: 82
Setting detail: SPECIMEN
Discharge: HOME | End: 2022-11-25

## 2022-11-25 LAB
ALBUMIN SERPL-MCNC: 3.7 GM/DL (ref 3.4–5)
ALP BLD-CCNC: 117 IU/L (ref 40–129)
ALT SERPL-CCNC: 16 U/L (ref 10–40)
ANION GAP SERPL CALCULATED.3IONS-SCNC: 10 MMOL/L (ref 4–16)
AST SERPL-CCNC: 15 IU/L (ref 15–37)
BILIRUB SERPL-MCNC: 0.2 MG/DL (ref 0–1)
BUN BLDV-MCNC: 57 MG/DL (ref 6–23)
CALCIUM SERPL-MCNC: 9 MG/DL (ref 8.3–10.6)
CHLORIDE BLD-SCNC: 109 MMOL/L (ref 99–110)
CO2: 23 MMOL/L (ref 21–32)
CREAT SERPL-MCNC: 2.4 MG/DL (ref 0.9–1.3)
GFR SERPL CREATININE-BSD FRML MDRD: 26 ML/MIN/1.73M2
GLUCOSE BLD-MCNC: 265 MG/DL (ref 70–99)
POTASSIUM SERPL-SCNC: 5.7 MMOL/L (ref 3.5–5.1)
SODIUM BLD-SCNC: 142 MMOL/L (ref 135–145)
TOTAL PROTEIN: 6.6 GM/DL (ref 6.4–8.2)

## 2022-11-25 PROCEDURE — 36415 COLL VENOUS BLD VENIPUNCTURE: CPT

## 2022-11-25 PROCEDURE — 9900360100 HC STAT COLLECTION FEE SNF

## 2022-11-25 PROCEDURE — 80053 COMPREHEN METABOLIC PANEL: CPT

## 2022-11-26 ENCOUNTER — HOSPITAL ENCOUNTER (OUTPATIENT)
Age: 82
Setting detail: SPECIMEN
Discharge: HOME | End: 2022-11-26
Payer: MEDICARE

## 2022-11-26 LAB — POTASSIUM SERPL-SCNC: 5.4 MMOL/L (ref 3.5–5.1)

## 2022-11-26 PROCEDURE — 9900360100 HC STAT COLLECTION FEE SNF

## 2022-11-26 PROCEDURE — 84132 ASSAY OF SERUM POTASSIUM: CPT

## 2022-11-26 PROCEDURE — 36415 COLL VENOUS BLD VENIPUNCTURE: CPT

## 2022-11-27 NOTE — PROGRESS NOTES
Patient Name: Siri Campos  Patient : 1940  Patient MRN: [id number]     Primary Oncologist: Chris Flores MD  Referring Provider: Saniya Mack MD     Date of Service: 2022      Chief Complaint: No chief complaint on file. Patient Active Problem List:     Gait disturbance     Generalized weakness     Diabetes mellitus (HCC)     Osteoarthritis     Anemia of chronic disorder     Infected implanted bladder sphincter cuff     Escherichia coli urinary tract infection     Hypertension     Sepsis ([address]     Acute encephalopathy     Type 2 diabetes mellitus with hypoglycemia without coma (HCC)     UTI (urinary tract infection) due to urinary indwelling catheter      Hyperkalemia     Acute kidney failure (HCC)     Leg weakness, bilateral     Type 2 diabetes mellitus with neurological manifestations, uncontrolled     Complicated UTI (urinary tract infection)     Essential hypertension     Severe muscle deconditioning     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 (HCC)     Frequent falls     Chronic kidney disease, stage 3a ([address]     Uncontrolled type 2 diabetes mellitus with peripheral neuropathy     Prostate cancer (HCC)     Suprapubic catheter ([address]     Sepsis due to urinary tract infection ([address]     Coagulase negative Staphylococcus bacteremia     Chronic kidney disease, stage IV (severe) (HCC)     Acute metabolic encephalopathy     SVT (supraventricular tachycardia) (HCC)     Acute metabolic encephalopathy due to hypoglycemia     History of prostate cancer     Cigarette nicotine dependence without complication     Altered mental status     Serratia marcescens infection     Hypoglycemia     Fungemia     Pyelonephritis     Bacteremia due to Enterococcus     HPI:   Siri Campos is a 78 y.o. male who was found to have AMS and brought in to ER on 10/24/2022. Noted to have hypoglycemia at [facility] and was treated with glucogon and D10.   2022 we were consulted for anemia evaluation.    On review of labs he is noted to have anemia with WBC 7.4, RBC 2.57, hemoglobin 7.9, hematocrit 25.8, .4, platelets 826. Patient noted to have decrease in hemoglobin to 6.9 on 11/3/2022 and is status post 1 unit packed red blood cells. Review of CBC 11.. with RBC 2.96, hemoglobin 9.1, hematocrit 28.9, MCV 97.6, platelets 022. Review of iron studies on November 3, 2022 with ferritin 508, iron 55, TIBC 239, transferrin percent 23. Last hospitalized 2022 and discharged 2022. Hospitalized with complicated UTI. Last PRBC infusion 22. Noted to have hematuria on last admission with cystoscopy and exhange of stent. 2022 he came in for follow up after discharge from hospital. He is on wheel chair. Currently he is at [facility]. Prior to hospitalization he lived with son. 2022 WBC 7. Hg 8.8, MCV 97. 6. plat 227.  2022 creatinine 2.1. We discussed about bone marrow study and he refused. He reported that he had ? bone marrow study 15 years ago in [facility]. He is not a very good historian. He is agreeable to have repeat labs prior to next OV. He follows with Dr Dali Sands for CKD. He follows with Dr Niall Garcia for prostate cancer. He is on ADT every 3 months, lupron. He has 3 sons. Mother  of colon cancer. Father  of cancer.     Past Medical History:   Diagnosis Date    Acute kidney failure ([address]     Acute urinary tract infection 03/15/2012    Anemia     Ataxia 2010    Ataxia     Bacteremia     Candidiasis     Chronic combined systolic and diastolic congestive heart failure (HCC)     Chronic kidney disease     Cognitive communication deficit     Diabetes mellitus ([address] 2011    type 2, controlled    Extended spectrum beta lactamase (ESBL) resistance     Fusion of spine of cervical region 10/01/2012    Gait disturbance 2011    History of prostate cancer 1996    adenocarcinoma    History of tobacco use 1959    Hydronephrosis     Hyperkalemia Hyperlipidemia 2011    Hypertension     Hypertensive heart disease with CHF (congestive heart failure) (HCC)     Hypotension     Immunodeficiency (HCC)     Metabolic encephalopathy     Muscle weakness     Osteoarthritis 2011    Osteoarthritis     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 PROSTATE SURGERY      PROSTATECTOMY      infection - had cancer     Social History     Tobacco Use    Smoking status: Former     Packs/day: 0.50     Types: Cigarettes     Quit date: 1976     Years since quittin.4    Smokeless tobacco: Never   Substance Use Topics    Alcohol use: No     Comment: Quit in     Drug use: No     Family History   Problem Relation Age of Onset    Cancer Mother     Diabetes Father     Heart Disease Father     High Blood Pressure Father     Diabetes Sister     High Blood Pressure Sister     Cancer Brother         prostate, breast    Diabetes Brother     Cancer Maternal Uncle     Diabetes Maternal Grandmother     Stroke Maternal Grandfather      Review of Systems: \"Per interval history; otherwise 10 point ROS is negative. \"     Vital Signs: There were no vitals taken for this visit.     Physical Exam:  CONSTITUTIONAL: awake, alert, cooperative, no apparent distress   EYES: pupils equal, round and reactive to light, sclera clear and + pallor  ENT: Normocephalic, without obvious abnormality, atraumatic  NECK: supple, symmetrical, no jugular venous distension and no carotid bruits   HEMATOLOGIC/LYMPHATIC: no cervical, supraclavicular or axillary lymphadenopathy   LUNGS: no increased work of breathing and clear to auscultation   CARDIOVASCULAR: regular rate and rhythm, normal S1 and S2, no murmur noted  ABDOMEN: normal bowel sounds x 4, soft, non-distended, non-tender, no masses palpated, no hepatosplenomegaly. Hopper cath noted. MUSCULOSKELETAL: he is on wheelchair. NEUROLOGIC: CN II - XII grossly intact. SKIN: Normal skin color, texture, turgor and no jaundice.  appears intact   EXTREMITIES: trace LE edema     Labs:  Hematology:  Lab Results   Component Value Date    WBC 7.0 11/23/2022    RBC 2.90 (L) 11/23/2022    HGB 9.0 (L) 11/23/2022    HCT 29.5 (L) 11/23/2022    .7 (H) 11/23/2022    MCH 31.0 11/23/2022    MCHC 30.5 (L) 11/23/2022    RDW 16.5 (H) 11/23/2022     11/23/2022    MPV 13.0 (H) 11/23/2022    BANDSPCT 6 08/29/2022    SEGSPCT 53.2 11/04/2022    EOSRELPCT 12.6 (H) 11/04/2022    BASOPCT 0.6 11/04/2022    LYMPHOPCT 25.4 11/04/2022    MONOPCT 7.9 (H) 11/04/2022    BANDABS 1.23 08/29/2022    SEGSABS 3.6 11/04/2022    EOSABS 0.9 11/04/2022    BASOSABS 0.0 11/04/2022    LYMPHSABS 1.7 11/04/2022    MONOSABS 0.5 11/04/2022    DIFFTYPE AUTOMATED DIFFERENTIAL 11/04/2022     Lab Results   Component Value Date    ESR 84 (H) 10/24/2022     Chemistry:  Lab Results   Component Value Date     11/25/2022    K 5.4 (H) 11/26/2022     11/25/2022    CO2 23 11/25/2022    BUN 57 (H) 11/25/2022    CREATININE 2.4 (H) 11/25/2022    GLUCOSE 265 (H) 11/25/2022    CALCIUM 9.0 11/25/2022    PROT 6.6 11/25/2022    LABALBU 3.7 11/25/2022    BILITOT 0.2 11/25/2022    ALKPHOS 117 11/25/2022    AST 15 11/25/2022    ALT 16 11/25/2022    LABGLOM 26 (L) 11/25/2022    GFRAA 31 (L) 10/13/2022    PHOS 3.5 11/04/2022    MG 1.9 09/19/2022    POCGLU 218 (H) 11/05/2022     Lab Results   Component Value Date    TSHHS 2.020 11/10/2022    T4FREE 1.48 11/03/2022     Immunology:  Lab Results   Component Value Date    PROT 6.6 11/25/2022     Coagulation Panel:  Lab Results   Component Value Date    PROTIME 14.0 10/24/2022    INR 1.08 10/24/2022    APTT 37.6 (H) 09/12/2022     Anemia Panel:  Lab Results   Component Value Date    DKJRUGME64 624.3 11/03/2022    FOLATE 14.3 11/03/2022     Tumor Markers:  Lab Results   Component Value Date    PSA 0.17 12/11/2014      Observations:  No data recorded      Assessment & Plan:  1. Anemia: review of iron studies reviewed. Anemia likely multifactorial including kidney disease and use of ASA and plavix. No overt bleeding. WBC and platelets WNL, He had one dos retacrit in the hospital. CBC in 11/2022 reviewed with Hg 8.8, MCV 97.6. He refused bone marrow study. We will continue to monitor CBC. 2. He has h/o prostate cancer. Follows with Dr Ousmane Peña. On ADT every 3 months. 3. He has CKD. Follows with Dr Kashif Santos. 4. Reportedly he has colonoscopy in [place] 5 years ago. RTC in 3 months or sooner. All of his questions have been answered for today. We will follow the patient. Thank you for allowing me to participate in the care of this very pleasant patient. Recent imaging and labs were reviewed and discussed with the patient.

## 2022-11-28 ENCOUNTER — HOSPITAL ENCOUNTER (OUTPATIENT)
Age: 82
Setting detail: SPECIMEN
Discharge: HOME | End: 2022-11-28

## 2022-11-28 LAB
ANION GAP SERPL CALCULATED.3IONS-SCNC: 12 MMOL/L (ref 4–16)
BUN BLDV-MCNC: 51 MG/DL (ref 6–23)
CALCIUM SERPL-MCNC: 8.6 MG/DL (ref 8.3–10.6)
CHLORIDE BLD-SCNC: 108 MMOL/L (ref 99–110)
CO2: 21 MMOL/L (ref 21–32)
CREAT SERPL-MCNC: 2.1 MG/DL (ref 0.9–1.3)
GFR SERPL CREATININE-BSD FRML MDRD: 30 ML/MIN/1.73M2
GLUCOSE BLD-MCNC: 211 MG/DL (ref 70–99)
HCT VFR BLD CALC: 28.3 % (ref 42–52)
HEMOGLOBIN: 8.8 GM/DL (ref 13.5–18)
MCH RBC QN AUTO: 30.3 PG (ref 27–31)
MCHC RBC AUTO-ENTMCNC: 31.1 % (ref 32–36)
MCV RBC AUTO: 97.6 FL (ref 78–100)
PDW BLD-RTO: 16.5 % (ref 11.7–14.9)
PLATELET # BLD: 227 K/CU MM (ref 140–440)
PMV BLD AUTO: 12.4 FL (ref 7.5–11.1)
POTASSIUM SERPL-SCNC: 5.6 MMOL/L (ref 3.5–5.1)
RBC # BLD: 2.9 M/CU MM (ref 4.6–6.2)
SODIUM BLD-SCNC: 141 MMOL/L (ref 135–145)
WBC # BLD: 7 K/CU MM (ref 4–10.5)

## 2022-11-28 PROCEDURE — 80048 BASIC METABOLIC PNL TOTAL CA: CPT

## 2022-11-28 PROCEDURE — 85027 COMPLETE CBC AUTOMATED: CPT

## 2022-11-28 PROCEDURE — 36415 COLL VENOUS BLD VENIPUNCTURE: CPT

## 2022-12-01 ENCOUNTER — HOSPITAL ENCOUNTER (OUTPATIENT)
Age: 82
Setting detail: SPECIMEN
Discharge: HOME | End: 2022-12-01

## 2022-12-01 PROCEDURE — 85027 COMPLETE CBC AUTOMATED: CPT

## 2022-12-02 ENCOUNTER — HOSPITAL ENCOUNTER (OUTPATIENT)
Age: 82
Setting detail: SPECIMEN
Discharge: HOME | End: 2022-12-02

## 2022-12-02 LAB
ANION GAP SERPL CALCULATED.3IONS-SCNC: 15 MMOL/L (ref 4–16)
BUN BLDV-MCNC: 52 MG/DL (ref 6–23)
CALCIUM SERPL-MCNC: 8.9 MG/DL (ref 8.3–10.6)
CHLORIDE BLD-SCNC: 107 MMOL/L (ref 99–110)
CO2: 17 MMOL/L (ref 21–32)
CREAT SERPL-MCNC: 2.1 MG/DL (ref 0.9–1.3)
GFR SERPL CREATININE-BSD FRML MDRD: 30 ML/MIN/1.73M2
GLUCOSE BLD-MCNC: 115 MG/DL (ref 70–99)
POTASSIUM SERPL-SCNC: 5.4 MMOL/L (ref 3.5–5.1)
SODIUM BLD-SCNC: 139 MMOL/L (ref 135–145)

## 2022-12-02 PROCEDURE — 36415 COLL VENOUS BLD VENIPUNCTURE: CPT

## 2022-12-02 PROCEDURE — 80048 BASIC METABOLIC PNL TOTAL CA: CPT

## 2022-12-07 ENCOUNTER — OFFICE VISIT (OUTPATIENT)
Dept: ONCOLOGY | Age: 82
End: 2022-12-07
Payer: MEDICARE

## 2022-12-07 ENCOUNTER — HOSPITAL ENCOUNTER (OUTPATIENT)
Dept: INFUSION THERAPY | Age: 82
Discharge: HOME | End: 2022-12-07
Payer: MEDICARE

## 2022-12-07 VITALS
SYSTOLIC BLOOD PRESSURE: 122 MMHG | BODY MASS INDEX: 30.46 KG/M2 | DIASTOLIC BLOOD PRESSURE: 60 MMHG | HEIGHT: 68 IN | WEIGHT: 201 LBS | TEMPERATURE: 96.9 F | OXYGEN SATURATION: 99 % | RESPIRATION RATE: 18 BRPM | HEART RATE: 61 BPM

## 2022-12-07 DIAGNOSIS — D64.9 ANEMIA, UNSPECIFIED TYPE: Primary | ICD-10-CM

## 2022-12-07 PROCEDURE — 1123F ACP DISCUSS/DSCN MKR DOCD: CPT | Performed by: INTERNAL MEDICINE

## 2022-12-07 PROCEDURE — G8417 CALC BMI ABV UP PARAM F/U: HCPCS | Performed by: INTERNAL MEDICINE

## 2022-12-07 PROCEDURE — 3074F SYST BP LT 130 MM HG: CPT | Performed by: INTERNAL MEDICINE

## 2022-12-07 PROCEDURE — 99211 OFF/OP EST MAY X REQ PHY/QHP: CPT

## 2022-12-07 PROCEDURE — G8427 DOCREV CUR MEDS BY ELIG CLIN: HCPCS | Performed by: INTERNAL MEDICINE

## 2022-12-07 PROCEDURE — 99213 OFFICE O/P EST LOW 20 MIN: CPT | Performed by: INTERNAL MEDICINE

## 2022-12-07 PROCEDURE — 1036F TOBACCO NON-USER: CPT | Performed by: INTERNAL MEDICINE

## 2022-12-07 PROCEDURE — 3078F DIAST BP <80 MM HG: CPT | Performed by: INTERNAL MEDICINE

## 2022-12-07 PROCEDURE — G8484 FLU IMMUNIZE NO ADMIN: HCPCS | Performed by: INTERNAL MEDICINE

## 2022-12-07 ASSESSMENT — PATIENT HEALTH QUESTIONNAIRE - PHQ9
SUM OF ALL RESPONSES TO PHQ9 QUESTIONS 1 & 2: 0
1. LITTLE INTEREST OR PLEASURE IN DOING THINGS: 0
SUM OF ALL RESPONSES TO PHQ QUESTIONS 1-9: 0
2. FEELING DOWN, DEPRESSED OR HOPELESS: 0
SUM OF ALL RESPONSES TO PHQ QUESTIONS 1-9: 0

## 2022-12-07 NOTE — PROGRESS NOTES
MA Rooming Questions  Patient: Jenae Bryan  MRN: [id number]    Date: 12/7/2022        NP      5. Did the patient have a depression screening completed today?  Yes    PHQ-9 Total Score: 0 (12/7/2022 11:22 AM)       PHQ-9 Given to (if applicable):               PHQ-9 Score (if applicable):                     [] Positive     [x]  Negative              Does question #9 need addressed (if applicable)                     [] Yes    []  No               Kristen Worley MA

## 2022-12-09 ENCOUNTER — HOSPITAL ENCOUNTER (OUTPATIENT)
Age: 82
Setting detail: SPECIMEN
Discharge: HOME | End: 2022-12-09

## 2022-12-09 LAB
ANION GAP SERPL CALCULATED.3IONS-SCNC: 11 MMOL/L (ref 4–16)
BUN BLDV-MCNC: 57 MG/DL (ref 6–23)
CALCIUM SERPL-MCNC: 8.4 MG/DL (ref 8.3–10.6)
CHLORIDE BLD-SCNC: 109 MMOL/L (ref 99–110)
CO2: 21 MMOL/L (ref 21–32)
CREAT SERPL-MCNC: 2.1 MG/DL (ref 0.9–1.3)
GFR SERPL CREATININE-BSD FRML MDRD: 30 ML/MIN/1.73M2
GLUCOSE BLD-MCNC: 166 MG/DL (ref 70–99)
POTASSIUM SERPL-SCNC: 6.2 MMOL/L (ref 3.5–5.1)
SODIUM BLD-SCNC: 141 MMOL/L (ref 135–145)

## 2022-12-09 PROCEDURE — 80048 BASIC METABOLIC PNL TOTAL CA: CPT

## 2022-12-09 PROCEDURE — 36415 COLL VENOUS BLD VENIPUNCTURE: CPT

## 2022-12-10 ENCOUNTER — HOSPITAL ENCOUNTER (OUTPATIENT)
Age: 82
Setting detail: SPECIMEN
Discharge: HOME | End: 2022-12-10
Payer: MEDICARE

## 2022-12-10 LAB
ANION GAP SERPL CALCULATED.3IONS-SCNC: 10 MMOL/L (ref 4–16)
BUN BLDV-MCNC: 58 MG/DL (ref 6–23)
CALCIUM SERPL-MCNC: 8.5 MG/DL (ref 8.3–10.6)
CHLORIDE BLD-SCNC: 108 MMOL/L (ref 99–110)
CO2: 22 MMOL/L (ref 21–32)
CREAT SERPL-MCNC: 2 MG/DL (ref 0.9–1.3)
GFR SERPL CREATININE-BSD FRML MDRD: 32 ML/MIN/1.73M2
GLUCOSE BLD-MCNC: 180 MG/DL (ref 70–99)
POTASSIUM SERPL-SCNC: 5.1 MMOL/L (ref 3.5–5.1)
SODIUM BLD-SCNC: 140 MMOL/L (ref 135–145)

## 2022-12-10 PROCEDURE — 36415 COLL VENOUS BLD VENIPUNCTURE: CPT

## 2022-12-10 PROCEDURE — 9900360100 HC STAT COLLECTION FEE SNF

## 2022-12-10 PROCEDURE — 80048 BASIC METABOLIC PNL TOTAL CA: CPT

## 2022-12-15 ENCOUNTER — HOSPITAL ENCOUNTER (OUTPATIENT)
Age: 82
Setting detail: SPECIMEN
Discharge: HOME | End: 2022-12-15
Payer: MEDICARE

## 2022-12-15 LAB
ANION GAP SERPL CALCULATED.3IONS-SCNC: 16 MMOL/L (ref 4–16)
BUN BLDV-MCNC: 46 MG/DL (ref 6–23)
CALCIUM SERPL-MCNC: 8.6 MG/DL (ref 8.3–10.6)
CHLORIDE BLD-SCNC: 106 MMOL/L (ref 99–110)
CO2: 19 MMOL/L (ref 21–32)
CREAT SERPL-MCNC: 2.2 MG/DL (ref 0.9–1.3)
GFR SERPL CREATININE-BSD FRML MDRD: 29 ML/MIN/1.73M2
GLUCOSE BLD-MCNC: 196 MG/DL (ref 70–99)
POTASSIUM SERPL-SCNC: 4.3 MMOL/L (ref 3.5–5.1)
SODIUM BLD-SCNC: 141 MMOL/L (ref 135–145)

## 2022-12-15 PROCEDURE — 36415 COLL VENOUS BLD VENIPUNCTURE: CPT

## 2022-12-15 PROCEDURE — 80048 BASIC METABOLIC PNL TOTAL CA: CPT

## 2022-12-22 ENCOUNTER — HOSPITAL ENCOUNTER (OUTPATIENT)
Age: 82
Setting detail: SPECIMEN
Discharge: HOME | End: 2022-12-22
Payer: MEDICARE

## 2022-12-22 LAB
ANION GAP SERPL CALCULATED.3IONS-SCNC: 12 MMOL/L (ref 4–16)
BUN BLDV-MCNC: 40 MG/DL (ref 6–23)
CALCIUM SERPL-MCNC: 8.2 MG/DL (ref 8.3–10.6)
CHLORIDE BLD-SCNC: 106 MMOL/L (ref 99–110)
CO2: 23 MMOL/L (ref 21–32)
CREAT SERPL-MCNC: 1.9 MG/DL (ref 0.9–1.3)
GFR SERPL CREATININE-BSD FRML MDRD: 34 ML/MIN/1.73M2
GLUCOSE BLD-MCNC: 153 MG/DL (ref 70–99)
POTASSIUM SERPL-SCNC: 4.3 MMOL/L (ref 3.5–5.1)
SODIUM BLD-SCNC: 141 MMOL/L (ref 135–145)

## 2022-12-22 PROCEDURE — 80048 BASIC METABOLIC PNL TOTAL CA: CPT

## 2022-12-22 PROCEDURE — 36415 COLL VENOUS BLD VENIPUNCTURE: CPT

## 2022-12-29 ENCOUNTER — HOSPITAL ENCOUNTER (OUTPATIENT)
Age: 82
Setting detail: SPECIMEN
Discharge: HOME | End: 2022-12-29
Payer: MEDICARE

## 2022-12-29 LAB
ANION GAP SERPL CALCULATED.3IONS-SCNC: 10 MMOL/L (ref 4–16)
BUN BLDV-MCNC: 36 MG/DL (ref 6–23)
CALCIUM SERPL-MCNC: 8.4 MG/DL (ref 8.3–10.6)
CHLORIDE BLD-SCNC: 105 MMOL/L (ref 99–110)
CO2: 27 MMOL/L (ref 21–32)
CREAT SERPL-MCNC: 1.9 MG/DL (ref 0.9–1.3)
GFR SERPL CREATININE-BSD FRML MDRD: 34 ML/MIN/1.73M2
GLUCOSE BLD-MCNC: 133 MG/DL (ref 70–99)
POTASSIUM SERPL-SCNC: 4.5 MMOL/L (ref 3.5–5.1)
SODIUM BLD-SCNC: 142 MMOL/L (ref 135–145)

## 2022-12-29 PROCEDURE — 36415 COLL VENOUS BLD VENIPUNCTURE: CPT

## 2022-12-29 PROCEDURE — 80048 BASIC METABOLIC PNL TOTAL CA: CPT

## 2023-01-05 ENCOUNTER — HOSPITAL ENCOUNTER (OUTPATIENT)
Age: 83
Setting detail: SPECIMEN
Discharge: HOME | End: 2023-01-05
Payer: MEDICARE

## 2023-01-05 LAB
ANION GAP SERPL CALCULATED.3IONS-SCNC: 11 MMOL/L (ref 4–16)
BUN BLDV-MCNC: 38 MG/DL (ref 6–23)
CALCIUM SERPL-MCNC: 8.6 MG/DL (ref 8.3–10.6)
CHLORIDE BLD-SCNC: 106 MMOL/L (ref 99–110)
CO2: 25 MMOL/L (ref 21–32)
CREAT SERPL-MCNC: 1.9 MG/DL (ref 0.9–1.3)
GFR SERPL CREATININE-BSD FRML MDRD: 34 ML/MIN/1.73M2
GLUCOSE BLD-MCNC: 145 MG/DL (ref 70–99)
POTASSIUM SERPL-SCNC: 4.7 MMOL/L (ref 3.5–5.1)
SODIUM BLD-SCNC: 142 MMOL/L (ref 135–145)

## 2023-01-05 PROCEDURE — 36415 COLL VENOUS BLD VENIPUNCTURE: CPT

## 2023-01-05 PROCEDURE — 80048 BASIC METABOLIC PNL TOTAL CA: CPT

## 2023-01-12 ENCOUNTER — HOSPITAL ENCOUNTER (OUTPATIENT)
Age: 83
Setting detail: SPECIMEN
Discharge: HOME | End: 2023-01-12
Payer: MEDICARE

## 2023-01-12 LAB
ANION GAP SERPL CALCULATED.3IONS-SCNC: 12 MMOL/L (ref 4–16)
BUN BLDV-MCNC: 41 MG/DL (ref 6–23)
CALCIUM SERPL-MCNC: 8 MG/DL (ref 8.3–10.6)
CHLORIDE BLD-SCNC: 104 MMOL/L (ref 99–110)
CO2: 25 MMOL/L (ref 21–32)
CREAT SERPL-MCNC: 2.1 MG/DL (ref 0.9–1.3)
GFR SERPL CREATININE-BSD FRML MDRD: 30 ML/MIN/1.73M2
GLUCOSE BLD-MCNC: 196 MG/DL (ref 70–99)
POTASSIUM SERPL-SCNC: 4.3 MMOL/L (ref 3.5–5.1)
SODIUM BLD-SCNC: 141 MMOL/L (ref 135–145)

## 2023-01-12 PROCEDURE — 36415 COLL VENOUS BLD VENIPUNCTURE: CPT

## 2023-01-12 PROCEDURE — 80048 BASIC METABOLIC PNL TOTAL CA: CPT

## 2023-01-19 ENCOUNTER — HOSPITAL ENCOUNTER (OUTPATIENT)
Age: 83
Setting detail: SPECIMEN
Discharge: HOME | End: 2023-01-19
Payer: MEDICARE

## 2023-01-19 LAB
ANION GAP SERPL CALCULATED.3IONS-SCNC: 10 MMOL/L (ref 4–16)
BUN BLDV-MCNC: 47 MG/DL (ref 6–23)
CALCIUM SERPL-MCNC: 8.3 MG/DL (ref 8.3–10.6)
CHLORIDE BLD-SCNC: 105 MMOL/L (ref 99–110)
CO2: 25 MMOL/L (ref 21–32)
CREAT SERPL-MCNC: 2.3 MG/DL (ref 0.9–1.3)
GFR SERPL CREATININE-BSD FRML MDRD: 27 ML/MIN/1.73M2
GLUCOSE BLD-MCNC: 126 MG/DL (ref 70–99)
POTASSIUM SERPL-SCNC: 4.5 MMOL/L (ref 3.5–5.1)
SODIUM BLD-SCNC: 140 MMOL/L (ref 135–145)

## 2023-01-19 PROCEDURE — 36415 COLL VENOUS BLD VENIPUNCTURE: CPT

## 2023-01-19 PROCEDURE — 80048 BASIC METABOLIC PNL TOTAL CA: CPT

## 2023-01-26 ENCOUNTER — HOSPITAL ENCOUNTER (OUTPATIENT)
Age: 83
Setting detail: SPECIMEN
Discharge: HOME | End: 2023-01-26
Payer: MEDICARE

## 2023-01-26 LAB
ANION GAP SERPL CALCULATED.3IONS-SCNC: 7 MMOL/L (ref 4–16)
BUN BLDV-MCNC: 39 MG/DL (ref 6–23)
CALCIUM SERPL-MCNC: 8.3 MG/DL (ref 8.3–10.6)
CHLORIDE BLD-SCNC: 106 MMOL/L (ref 99–110)
CO2: 26 MMOL/L (ref 21–32)
CREAT SERPL-MCNC: 2.2 MG/DL (ref 0.9–1.3)
GFR SERPL CREATININE-BSD FRML MDRD: 29 ML/MIN/1.73M2
GLUCOSE BLD-MCNC: 230 MG/DL (ref 70–99)
POTASSIUM SERPL-SCNC: 4.1 MMOL/L (ref 3.5–5.1)
PSA ULTRASENSITIVE: 0.53 NG/ML (ref 0–4)
SODIUM BLD-SCNC: 139 MMOL/L (ref 135–145)

## 2023-01-26 PROCEDURE — 84153 ASSAY OF PSA TOTAL: CPT

## 2023-01-26 PROCEDURE — 36415 COLL VENOUS BLD VENIPUNCTURE: CPT

## 2023-01-26 PROCEDURE — 80048 BASIC METABOLIC PNL TOTAL CA: CPT

## 2023-02-02 ENCOUNTER — HOSPITAL ENCOUNTER (OUTPATIENT)
Age: 83
Setting detail: SPECIMEN
Discharge: HOME | End: 2023-02-02
Payer: MEDICARE

## 2023-02-02 LAB
ANION GAP SERPL CALCULATED.3IONS-SCNC: 9 MMOL/L (ref 4–16)
BUN SERPL-MCNC: 39 MG/DL (ref 6–23)
CALCIUM SERPL-MCNC: 8.1 MG/DL (ref 8.3–10.6)
CHLORIDE BLD-SCNC: 109 MMOL/L (ref 99–110)
CO2: 26 MMOL/L (ref 21–32)
CREAT SERPL-MCNC: 2.2 MG/DL (ref 0.9–1.3)
GFR SERPL CREATININE-BSD FRML MDRD: 29 ML/MIN/1.73M2
GLUCOSE SERPL-MCNC: 235 MG/DL (ref 70–99)
POTASSIUM SERPL-SCNC: 4 MMOL/L (ref 3.5–5.1)
SODIUM BLD-SCNC: 144 MMOL/L (ref 135–145)

## 2023-02-02 PROCEDURE — 36415 COLL VENOUS BLD VENIPUNCTURE: CPT

## 2023-02-02 PROCEDURE — 80048 BASIC METABOLIC PNL TOTAL CA: CPT

## 2023-02-03 ENCOUNTER — OFFICE VISIT (OUTPATIENT)
Dept: NEUROLOGY | Age: 83
End: 2023-02-03
Payer: MEDICARE

## 2023-02-03 VITALS
BODY MASS INDEX: 31.67 KG/M2 | OXYGEN SATURATION: 98 % | WEIGHT: 209 LBS | SYSTOLIC BLOOD PRESSURE: 122 MMHG | HEIGHT: 68 IN | HEART RATE: 64 BPM | DIASTOLIC BLOOD PRESSURE: 72 MMHG

## 2023-02-03 DIAGNOSIS — N18.4 CHRONIC KIDNEY DISEASE, STAGE IV (SEVERE) (HCC): ICD-10-CM

## 2023-02-03 DIAGNOSIS — G93.40 ACUTE ENCEPHALOPATHY: Primary | ICD-10-CM

## 2023-02-03 DIAGNOSIS — I63.81 LACUNAR STROKE (HCC): ICD-10-CM

## 2023-02-03 DIAGNOSIS — I10 PRIMARY HYPERTENSION: ICD-10-CM

## 2023-02-03 PROCEDURE — 3074F SYST BP LT 130 MM HG: CPT | Performed by: NURSE PRACTITIONER

## 2023-02-03 PROCEDURE — G8427 DOCREV CUR MEDS BY ELIG CLIN: HCPCS | Performed by: NURSE PRACTITIONER

## 2023-02-03 PROCEDURE — 1036F TOBACCO NON-USER: CPT | Performed by: NURSE PRACTITIONER

## 2023-02-03 PROCEDURE — 1123F ACP DISCUSS/DSCN MKR DOCD: CPT | Performed by: NURSE PRACTITIONER

## 2023-02-03 PROCEDURE — G8417 CALC BMI ABV UP PARAM F/U: HCPCS | Performed by: NURSE PRACTITIONER

## 2023-02-03 PROCEDURE — G8484 FLU IMMUNIZE NO ADMIN: HCPCS | Performed by: NURSE PRACTITIONER

## 2023-02-03 PROCEDURE — 3078F DIAST BP <80 MM HG: CPT | Performed by: NURSE PRACTITIONER

## 2023-02-03 PROCEDURE — 99214 OFFICE O/P EST MOD 30 MIN: CPT | Performed by: NURSE PRACTITIONER

## 2023-02-03 NOTE — PROGRESS NOTES
2/3/23    [facility]  1940    Chief Complaint   Patient presents with    Follow-Up from [address] Quezada is a 78 y.o. male presenting today for hospital follow-up of: Altered mental status due to toxic metabolic encephalopathy superimposed on HTN, hypoglycemia, anemia, UTI, bacteremia and likely delirium. MRI of the brain did not reveal any new areas of stroke revealing a chronic lacunar infarct in the right external capsule. CTA head neck showed chronic stenosis of the left MCA, right PCA left vertebral artery. Aspirin and Plavix was recommended for intracranial stenosis for 30 days then monotherapy with aspirin as well as statin for secondary stroke prevention thereafter. Darío Espino is in a wheelchair today. He comes from [address] He is hoping to go home soon, he is planning to live with his son Ramez Yen. He is currently participating in PT. He is able to ambulate with a walker he tells me. He denies any new stroke-like/TIA symptoms. He remains on ASA and statin for secondary stroke prevention.      Current Outpatient Medications   Medication Sig Dispense Refill    insulin glargine (LANTUS SOLOSTAR) 100 UNIT/ML injection pen Inject 17 Units into the skin nightly      sodium zirconium cyclosilicate (LOKELMA) 5 g PACK oral suspension Take 5 g by mouth three times a week      senna (SENOKOT) 8.6 MG tablet Take 2 tablets by mouth daily      amLODIPine (NORVASC) 5 MG tablet Take 1 tablet by mouth 2 times daily 30 tablet 3    bisacodyl (DULCOLAX) 10 MG suppository Place 1 suppository rectally daily as needed for Constipation 1 suppository 0    ferrous sulfate (IRON 325) 325 (65 Fe) MG tablet Take 1 tablet by mouth every other day 30 tablet 3    Melatonin 1 MG CAPS Take 1 caplet by mouth at bedtime 30 capsule 0    cloNIDine (CATAPRES) 0.1 MG tablet Take 1 tablet by mouth every 8 hours as needed for High Blood Pressure (give for sbp > 165) 60 tablet 3    carvedilol (COREG) 6.25 MG tablet Take 1 tablet by mouth 2 times daily (Patient taking differently: Take 12.5 mg by mouth 2 times daily) 1 tablet 0    torsemide (DEMADEX) 20 MG tablet Take 1 tablet by mouth daily as needed (Give as needed for edema or weight gain. Weigh three times a week, give if gains 2 pounds or more) Mon/Wed/Fri (Patient taking differently: Take 20 mg by mouth three times a week Mon/Wed/Fri) 1 tablet 0    insulin lispro (HUMALOG) 100 UNIT/ML SOLN injection vial Inject 0-8 Units into the skin 3 times daily (with meals)  No Insulin  200-249 2 Units  250-299 4 Units  300-349 6 Units  Over 349 8 Units and notify physician 1 each 0    atorvastatin (LIPITOR) 80 MG tablet Take 80 mg by mouth nightly      pregabalin (LYRICA) 50 MG capsule take 1 capsule by mouth twice a day for NERVE PAIN      Glucagon, rDNA, (GLUCAGON EMERGENCY) 1 MG KIT Inject as directed as needed      omeprazole (PRILOSEC) 20 MG delayed release capsule Take 40 mg by mouth daily      levothyroxine (SYNTHROID) 75 MCG tablet Take 75 mcg by mouth Daily      NONFORMULARY Liquid Protein bid      aspirin 81 MG chewable tablet Take 1 tablet by mouth daily (Patient taking differently: Take 81 mg by mouth nightly) 30 tablet 3    polyethylene glycol (GLYCOLAX) 17 g packet Take 17 g by mouth 2 times daily And PRN      lidocaine (LIDODERM) 5 % Place 1 patch onto the skin daily Indications: L flank 12 hours on, 12 hours off.      magnesium hydroxide (MILK OF MAGNESIA) 400 MG/5ML suspension Take 30 mLs by mouth daily as needed for Constipation      glucose (GLUTOSE) 40 % GEL Take 15 g by mouth as needed      allopurinol (ZYLOPRIM) 100 MG tablet Take 100 mg by mouth daily      acetaminophen (TYLENOL) 325 MG tablet Take 2 tablets by mouth every 6 hours as needed for Pain 120 tablet 3    docusate sodium (COLACE, DULCOLAX) 100 MG CAPS Take 100 mg by mouth 2 times daily as needed for Constipation.  20 capsule 0    aluminum & magnesium hydroxide-simethicone (MAALOX) 057-601-24 MG/5ML SUSP suspension Take 30 mLs by mouth every 6 hours as needed for Indigestion 300 mL 3     No current facility-administered medications for this visit. Physical Exam:  Mental Status   Orientation: oriented to person, oriented to place, oriented to problem, and oriented to time    Mood/affectappropriate mood and appropriate affect   Memory/Other: recent memory intact, remote memory intact, fund of knowledge intact, attention span normal, and concentration normal  Language  Language: (normal) language, no dysarthria, (normal) articulation, and no dysphasia/aphasia  Cranial Nerves   Eyes: pupils normal size and reactive to light and visual fields appear full   CN III, IV, VI : extraocular muscle strength normal, normal pursuit, no nystagmus, and no ptosis   Facial Motor: normal facial motor   CN XII: tongue protrudes midline  Motor Exam:       Strength 5/5 UE's/LE's b/l  Tone and bulk normal   No pronator drift     Deep Tendon Reflexes: 2/4 biceps, triceps, brachioradialis, patellar, and achilles b/l; flexor plantar responses b/l     Sensation: Intact light touch/pinprick/vibration UE's/LE's b/l     Coordination/Cerebellum:       Tremors--none       Gait and stance:      Gait: deferred      27 Point MMSE Screen:   Orientation (Time): 5   Orientation (Place): /   Learning 3 Objects: 3/3   Attention: 5/5   Recall 3 Objects: 2/3   Namin/2   Repitition:    3-Step Command: 3/3   TOTAL:     /72 (Site: Right Upper Arm, Position: Sitting, Cuff Size: Medium Adult)   Pulse 64   Ht 5' 8\" (1.727 m)   Wt 209 lb (94.8 kg)   SpO2 98%   BMI 31.78 kg/m²     Assessment and Plan     Diagnosis Orders   1. Acute encephalopathy        2. Lacunar stroke ([address]        3. Chronic kidney disease, stage IV (severe) ([address]        4.  Primary hypertension          Maranda Werner was seen in neurological follow up in regards to Altered mental status due to toxic metabolic encephalopathy superimposed on HTN, hypoglycemia, anemia, UTI, bacteremia and likely delirium. He remains on ASA and statin for secondary stroke prevention. He is from [facility], he is hopeful to be going home soon to live with his son Emmanuelle Pearl. He is participating in PT for strength and impaired gait. He does not have any concerning cognitive deficits. He scored a 26/27 on his MMSE. He denies any new stoke-like/TIA symptoms. We discussed the importance of modification of stroke risk factors. We discussed the importance of the adherence to secondary stroke preventative medications. We discussed the importance of alerting EMS and presenting to the hospital at the onset of any stroke-like symptoms. Return in about 3 months (around 5/3/2023).     Niko Patel, VIVIANA - CNP

## 2023-02-03 NOTE — PATIENT INSTRUCTIONS
Please contact our office around 2/8/2023 to get your follow up appointment made. Our scheduling phone number is [phone]. Thank you!

## 2023-02-12 ENCOUNTER — HOSPITAL ENCOUNTER (OUTPATIENT)
Age: 83
Setting detail: SPECIMEN
Discharge: HOME | End: 2023-02-12
Payer: MEDICARE

## 2023-02-12 PROCEDURE — 81003 URINALYSIS AUTO W/O SCOPE: CPT

## 2023-02-12 PROCEDURE — 87086 URINE CULTURE/COLONY COUNT: CPT

## 2023-02-13 LAB
BACTERIA: ABNORMAL /HPF
BILIRUBIN URINE: NEGATIVE MG/DL
BLOOD, URINE: ABNORMAL
CLARITY: CLEAR
COLOR: YELLOW
GLUCOSE, URINE: 500 MG/DL
KETONES, URINE: NEGATIVE MG/DL
LEUKOCYTE ESTERASE, URINE: ABNORMAL
NITRITE URINE, QUANTITATIVE: NEGATIVE
PH, URINE: 7 (ref 5–8)
PROTEIN UA: 100 MG/DL
RBC URINE: 88 /HPF (ref 0–3)
SPECIFIC GRAVITY UA: 1.01 (ref 1–1.03)
SQUAMOUS EPITHELIAL: <1 /HPF
TRICHOMONAS: ABNORMAL /HPF
UROBILINOGEN, URINE: 0.2 MG/DL (ref 0.2–1)
WBC UA: 118 /HPF (ref 0–2)

## 2023-02-14 LAB
CULTURE: NORMAL
Lab: NORMAL
SPECIMEN: NORMAL

## 2023-02-16 NOTE — PROGRESS NOTES
Patient Name: Della Prado  Patient : 1940  Patient MRN: [id number]     Primary Oncologist: Sylvania Severance, MD  Referring Provider: Dean Freed MD     Date of Service: 3/13/2023      Chief Complaint: No chief complaint on file. He came in for follow up visit. Patient Active Problem List:     Gait disturbance     Generalized weakness     Diabetes mellitus (HCC)     Osteoarthritis     Anemia of chronic disorder     Infected implanted bladder sphincter cuff     Escherichia coli urinary tract infection     Hypertension     Sepsis ([facility]     Acute encephalopathy     Type 2 diabetes mellitus with hypoglycemia without coma (HCC)     UTI (urinary tract infection) due to urinary indwelling catheter      Leg weakness, bilateral     Type 2 diabetes mellitus with neurological manifestations, uncontrolled     Complicated UTI (urinary tract infection)     Essential hypertension     Severe muscle deconditioning     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 (HCC)     Frequent falls     Chronic kidney disease, stage 3a (HCC)     Uncontrolled type 2 diabetes mellitus with peripheral neuropathy     Prostate cancer (HCC)     Suprapubic catheter (HCC)     Chronic kidney disease, stage IV (severe) (HCC)     SVT (supraventricular tachycardia) ([facility]     History of prostate cancer     Cigarette nicotine dependence without complication     HPI:   Della Prado is a pleasant 78 y.o. male who was found to have AMS and brought in to ER on 10/24/2022. Noted to have hypoglycemia at [facility] and was treated with glucogon and D10.   2022 we were consulted for anemia evaluation. On review of labs he is noted to have anemia with WBC 7.4, RBC 2.57, hemoglobin 7.9, hematocrit 25.8, .4, platelets 759. Patient noted to have decrease in hemoglobin to 6.9 on 11/3/2022 and is status post 1 unit packed red blood cells. Review of CBC 2022 with RBC 2.96, hemoglobin 9.1, hematocrit 28.9, MCV 97.6, platelets 286. Review of iron studies on November 3, 2022 with ferritin 508, iron 55, TIBC 239, transferrin percent 23. Last hospitalized 2022 and discharged 2022. Hospitalized with complicated UTI. Last PRBC infusion 22. Noted to have hematuria on last admission with cystoscopy and exhange of stent. 2022 he came in for follow up after discharge from hospital. He is on wheel chair. Currently he is at [facility]. Prior to hospitalization he lived with son. 2022 WBC 7. Hg 8.8, MCV 97. 6. plat 227.  2022 creatinine 2.1. We discussed about bone marrow study and he refused. He reported that he had ? bone marrow study in the 's in [facility]. He is not a very good historian. He is agreeable to have repeat labs prior to next OV. He follows with Dr Ru Ignacio for CKD. He follows with Dr Santi Levin for prostate cancer. He is on ADT every 3 months, lupron. He has 3 sons. Mother  of colon cancer. Father  of cancer. 3/13/2023 he came in for a follow up visit  3/2023 B-12 478. Hg 7.1, MCV 97. 9. plast 172. Ferritin 244, Iron 19, TIBC 206, sat 9%. Folate 10.7. He is agreeable to have labs today with potential blood transfusion. He takes daily oral iron. He refused bone marrow study. Will repeat labs prior to next OV. + wheel chair. No acute pain. Denies any nausea, vomiting or diarrhea. No fever or chills. No chest pain, shortness of breath or palpitation. No headaches or dizzy spell. No specific bone pain. No melena or hematochezia. Denied any dysuria or hematuria.     Past Medical History:   Diagnosis Date    Acute kidney failure ([facility]     Acute urinary tract infection 03/15/2012    Anemia     Ataxia 2010    Ataxia     Bacteremia     Candidiasis     Chronic combined systolic and diastolic congestive heart failure (HCC)     Chronic kidney disease     Cognitive communication deficit     Diabetes mellitus ([facility] 2011    type 2, controlled    Extended spectrum beta lactamase (ESBL) resistance     Fusion of spine of cervical region 10/01/2012    Gait disturbance 2011    History of prostate cancer 1996    adenocarcinoma    History of tobacco use 1959    Hydronephrosis     Hyperkalemia     Hyperlipidemia 2011    Hypertension     Hypertensive heart disease with CHF (congestive heart failure) ([address]     Hypotension     Immunodeficiency ([address]     Metabolic encephalopathy     Muscle weakness     Osteoarthritis 2011    Osteoarthritis     Secondary Hyperaldosteronism ([address]     Supraventricular tachycardia ([address]     Tubulo-interstitial nephritis      Past Surgical History:   Procedure Laterality Date    BLADDER SURGERY      BLADDER SURGERY Left 2022    CYSTOSCOPY LEFT STENT EXCHANGE performed by Jono Friedman MD at [facility] Left 2022    LEFT CYSTOSCOPY URETERAL STENT EXCHANGE AND [address] performed by Bradley Ugarte MD at [address]  2013    Cysto with removal of artificial sphinter and placement of suprapubic catheter. PROSTATE SURGERY      PROSTATECTOMY      infection - had cancer     Social History     Tobacco Use    Smoking status: Former     Packs/day: 0.50     Types: Cigarettes     Quit date: 1976     Years since quittin.6    Smokeless tobacco: Never   Substance Use Topics    Alcohol use: No     Comment: Quit in     Drug use: No     Family History   Problem Relation Age of Onset    Cancer Mother     Diabetes Father     Heart Disease Father     High Blood Pressure Father     Diabetes Sister     High Blood Pressure Sister     Cancer Brother         prostate, breast    Diabetes Brother     Cancer Maternal Uncle     Diabetes Maternal Grandmother     Stroke Maternal Grandfather      Review of Systems: \"Per interval history; otherwise 10 point ROS is negative. \"     Vital Signs:   There were no vitals taken for this visit.    Physical Exam:  CONSTITUTIONAL: awake, alert, cooperative, no apparent distress   EYES: pupils equal, round. Sclera clear and + pallor  ENT: Normocephalic, without obvious abnormality, atraumatic  NECK: supple, symmetrical, no jugular venous distension and no carotid bruits   HEMATOLOGIC/LYMPHATIC: no cervical, supraclavicular or axillary lymphadenopathy   LUNGS: no increased work of breathing and clear to auscultation   CARDIOVASCULAR: regular rate and rhythm, normal S1 and S2, no murmur   ABDOMEN: normal bowel sound, soft, non-distended, non-tender, no masses palpated, no hepatosplenomegaly. Hopper cath noted. MUSCULOSKELETAL: + wheelchair. NEUROLOGIC: CN II - XII grossly intact. SKIN: Normal skin color, texture, turgor and no jaundice. EXTREMITIES: trace LE edema. No cyanosis.     Labs:  Hematology:  Lab Results   Component Value Date    WBC 7.0 11/28/2022    RBC 2.90 (L) 11/28/2022    HGB 8.8 (L) 11/28/2022    HCT 28.3 (L) 11/28/2022    MCV 97.6 11/28/2022    MCH 30.3 11/28/2022    MCHC 31.1 (L) 11/28/2022    RDW 16.5 (H) 11/28/2022     11/28/2022    MPV 12.4 (H) 11/28/2022    BANDSPCT 6 08/29/2022    SEGSPCT 53.2 11/04/2022    EOSRELPCT 12.6 (H) 11/04/2022    BASOPCT 0.6 11/04/2022    LYMPHOPCT 25.4 11/04/2022    MONOPCT 7.9 (H) 11/04/2022    BANDABS 1.23 08/29/2022    SEGSABS 3.6 11/04/2022    EOSABS 0.9 11/04/2022    BASOSABS 0.0 11/04/2022    LYMPHSABS 1.7 11/04/2022    MONOSABS 0.5 11/04/2022    DIFFTYPE AUTOMATED DIFFERENTIAL 11/04/2022     Lab Results   Component Value Date    ESR 84 (H) 10/24/2022     Chemistry:  Lab Results   Component Value Date     02/02/2023    K 4.0 02/02/2023     02/02/2023    CO2 26 02/02/2023    BUN 39 (H) 02/02/2023    CREATININE 2.2 (H) 02/02/2023    GLUCOSE 235 (H) 02/02/2023    CALCIUM 8.1 (L) 02/02/2023    PROT 6.6 11/25/2022    LABALBU 3.7 11/25/2022    BILITOT 0.2 11/25/2022    ALKPHOS 117 11/25/2022    AST 15 11/25/2022    ALT 16 11/25/2022    LABGLOM 29 (L) 02/02/2023    GFRAA 31 (L) 10/13/2022    PHOS 3.5 11/04/2022    MG 1.9 09/19/2022    POCGLU 218 (H) 11/05/2022     Lab Results   Component Value Date    TSHHS 2.020 11/10/2022    T4FREE 1.48 11/03/2022     Immunology:  Lab Results   Component Value Date    PROT 6.6 11/25/2022     Coagulation Panel:  Lab Results   Component Value Date    PROTIME 14.0 10/24/2022    INR 1.08 10/24/2022    APTT 37.6 (H) 09/12/2022     Anemia Panel:  Lab Results   Component Value Date    BIIKSPZV51 624.3 11/03/2022    FOLATE 14.3 11/03/2022     Tumor Markers:  Lab Results   Component Value Date    PSA 0.17 12/11/2014      Observations:  No data recorded      Assessment & Plan:  1. Anemia: review of iron studies reviewed. Anemia likely multifactorial including kidney disease and use of ASA and plavix. No overt bleeding. WBC and platelets WNL, He had one dose retacrit in the hospital. CBC in 11/2022 reviewed with Hg 8.8, MCV 97.6. He refused bone marrow study. 3/2023 B-12 478. Hg 7.1, MCV 97. 9. plast 172. Ferritin 244, Iron 19, TIBC 206, sat 9%. Folate 10.7. He is agreeable to have labs today with potential blood transfusion. He takes daily oral iron. He refused bone marrow study. Will repeat labs prior to next OV. + wheel chair. We will continue to monitor CBC. 2. He has h/o prostate cancer. Follows with Dr Steve Bravo. On ADT every 3 months. 3. He has CKD. Follows with Dr Eloisa Munguia. 4. Reportedly he has colonoscopy in [place] 5 years ago. RTC in 3 months or sooner. All of his questions have been answered for today. We will follow the patient. Thank you for allowing me to participate in the care of this very pleasant patient. Recent imaging and labs were reviewed and discussed with the patient.

## 2023-02-17 ENCOUNTER — HOSPITAL ENCOUNTER (OUTPATIENT)
Age: 83
Setting detail: SPECIMEN
Discharge: HOME | End: 2023-02-17
Payer: MEDICARE

## 2023-02-17 PROCEDURE — 36415 COLL VENOUS BLD VENIPUNCTURE: CPT

## 2023-02-17 PROCEDURE — 81001 URINALYSIS AUTO W/SCOPE: CPT

## 2023-02-17 PROCEDURE — 87077 CULTURE AEROBIC IDENTIFY: CPT

## 2023-02-17 PROCEDURE — 87186 SC STD MICRODIL/AGAR DIL: CPT

## 2023-02-17 PROCEDURE — 87086 URINE CULTURE/COLONY COUNT: CPT

## 2023-02-19 ENCOUNTER — HOSPITAL ENCOUNTER (OUTPATIENT)
Age: 83
Setting detail: SPECIMEN
Discharge: HOME | End: 2023-02-19

## 2023-02-20 ENCOUNTER — HOSPITAL ENCOUNTER (OUTPATIENT)
Age: 83
Setting detail: SPECIMEN
Discharge: HOME | End: 2023-02-20
Payer: MEDICARE

## 2023-02-20 LAB
ANION GAP SERPL CALCULATED.3IONS-SCNC: 11 MMOL/L (ref 4–16)
BACTERIA: NEGATIVE /HPF
BILIRUBIN URINE: NEGATIVE MG/DL
BLOOD, URINE: ABNORMAL
BUN SERPL-MCNC: 35 MG/DL (ref 6–23)
CALCIUM SERPL-MCNC: 8 MG/DL (ref 8.3–10.6)
CHLORIDE BLD-SCNC: 104 MMOL/L (ref 99–110)
CLARITY: ABNORMAL
CO2: 27 MMOL/L (ref 21–32)
COLOR: YELLOW
CREAT SERPL-MCNC: 2.5 MG/DL (ref 0.9–1.3)
CULTURE: ABNORMAL
GFR SERPL CREATININE-BSD FRML MDRD: 25 ML/MIN/1.73M2
GLUCOSE SERPL-MCNC: 241 MG/DL (ref 70–99)
GLUCOSE, URINE: NEGATIVE MG/DL
KETONES, URINE: NEGATIVE MG/DL
LEUKOCYTE ESTERASE, URINE: ABNORMAL
Lab: ABNORMAL
NITRITE URINE, QUANTITATIVE: POSITIVE
PH, URINE: 6.5 (ref 5–8)
POTASSIUM SERPL-SCNC: 4.1 MMOL/L (ref 3.5–5.1)
PROTEIN UA: 100 MG/DL
RBC URINE: 6 /HPF (ref 0–3)
SODIUM BLD-SCNC: 142 MMOL/L (ref 135–145)
SPECIFIC GRAVITY UA: 1.01 (ref 1–1.03)
SPECIMEN: ABNORMAL
SQUAMOUS EPITHELIAL: 1 /HPF
TRICHOMONAS: ABNORMAL /HPF
UROBILINOGEN, URINE: 0.2 MG/DL (ref 0.2–1)
WBC CLUMP: ABNORMAL /HPF
WBC UA: 112 /HPF (ref 0–2)

## 2023-02-20 PROCEDURE — 80048 BASIC METABOLIC PNL TOTAL CA: CPT

## 2023-02-20 PROCEDURE — 36415 COLL VENOUS BLD VENIPUNCTURE: CPT

## 2023-02-23 NOTE — DISCHARGE SUMMARY
Patient Name: Tobi Vickers  Patient :  1940  Patient MRN:   [id number]      Admission Date:  2021  Discharge Date:   2021. 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 He was having positional dizziness with lightheadedness. He noted that his urine was becoming cloudy which has indicated an infection in the past. Patient was noted to be mildly dehydrated with a urinary tract infection and elevated creatinine. Blood pressures fluctuated with his blood sugar. Urology was consulted and his suprapubic catheter was exchanged. The patient admitted he was not motivated to regularly seek out catheter changes as an outpatient. Prior (baseline) level of function: Independent.     Current level of function:         Current  IRF-LESTER and Goals:   Occupational Therapy:    Short term goals  Time Frame for Short term goals: STGs=LTGs :   Long term goals  Time Frame for Long term goals : 7-10 days or until d/c  Long term goal 1: Pt will complete grooming tasks Ind  Long term goal 2: Pt will complete total body bathing mod I  Long term goal 3: Pt will complete UB dressing Ind  Long term goal 4: Pt will complete LB dressing Ind  Long term goal 5: Pt will doff/don footwear Ind  Long term goals 6: Pt will complete toileting mod I  Long term goal 7: Pt will complete functional transfers (bed, chair, toilet, shower) c DME PRN and mod I  Long term goal 8: Pt will perform therex/therax to facilitate increased strength/endurance/ax tolerance (c emphasis on dynamic standing balance/tolerance >8 mins, BUE endurance) c SBA  Long term goal 9: Pt will complete simple homemaking tasks c DME PRN and mod I :                                       Eating: Eating  Assistance Needed: Independent  Comment: X  CARE Score: 6  Discharge Goal: Independent       Oral Hygiene: Oral Hygiene  Assistance Needed: Independent  Comment: X  CARE Score: 6  Discharge Goal: Independent    UB/LB Bathing: Shower/Bathe Self  Assistance Needed: Independent  Comment: X  CARE Score: 6  Discharge Goal: Independent    UB Dressing: Upper Body Dressing  Assistance Needed: Independent  Comment: X  CARE Score: 6  Discharge Goal: Independent         LB Dressing: Lower Body Dressing  Assistance Needed: Independent  Comment: X  CARE Score: 6  Discharge Goal: Independent    Donning and [place] Footwear: Putting On/Taking Off Footwear  Assistance Needed: Independent  Comment: X  CARE Score: 6  Discharge Goal: Independent      Toileting: Toileting Hygiene  Assistance Needed: Independent  Comment: X  CARE Score: 6  Discharge Goal: Independent      Toilet Transfers: Toilet Transfer  Assistance Needed: Independent  Comment:  Mod I  CARE Score: 6  Discharge Goal: Independent    Physical Therapy:         Long term goals  Time Frame for Long term goals : 7days  Long term goal 1: Pt will perform all bed mobility with independence  Long term goal 2: Pt will perform sit to stand, pivot and car transfers  with mod i  Long term goal 3: Pt will ambulate 150  feet on level surfaces and 10' on unlevel surfaces with mod I  using 4ww  Long term goal 4: Pt will ascend/descend curb step with   4ww and up to 4    steps with  rail(s) with    mod I  Long term goal 5: Pt will retrieve light item from floor with mod i  using 4ww and reacher      Bed Mobility:   Sit to Lying  Assistance Needed: Independent  Comment: Ind without use of bed features, leg bag in place  CARE Score: 6  Discharge Goal: Independent  Roll Left and Right  Assistance Needed: Independent  Comment: Ind without use of bed features, leg bag in place  CARE Score: 6  Discharge Goal: Independent  Lying to Sitting on Side of Bed  Assistance Needed: Independent  Comment: Ind without use of bed features, leg bag in place  CARE Score: 6  Discharge Goal: Independent    Transfers:    Sit to Stand  Assistance Needed: Independent  Comment: Mod Ind using rollator and with leg bag in place  CARE Score: 6  Discharge Goal: Independent  Chair/Bed-to-Chair Transfer  Assistance Needed: Independent  Comment: Mod Ind using rollator and with leg bag in place  CARE Score: 6  Discharge Goal: Independent     Car Transfer  Assistance Needed: Independent  Comment: Mod Ind using rollator and with leg bag in place  CARE Score: 6  Discharge Goal: Independent    Ambulation:    Walking Ability  Does the Patient Walk?: Yes     Walk 10 Feet  Assistance Needed: Independent  Comment: Mod Ind using rollator  CARE Score: 6  Discharge Goal: Independent     Walk 50 Feet with Two Turns  Assistance Needed: Independent  Comment: Mod Ind using rollator  CARE Score: 6  Discharge Goal: Independent     Walk 150 Feet  Assistance Needed: Independent  Comment: Mod Ind using rollator  Reason if not Attempted: Not attempted due to medical condition or safety concerns  CARE Score: 6  Discharge Goal: Independent     Walking 10 Feet on Uneven Surfaces  Assistance Needed: Independent  Comment: Mod Ind using rollator  CARE Score: 6  Discharge Goal: Independent     1 Step (Curb)  Assistance Needed: Independent  Comment:  Mod Ind using rollator and with leg bag in place  CARE Score: 6  Discharge Goal: Independent     4 Steps  Assistance Needed: Independent  Comment: Mod Ind using railings with leg bag in place  CARE Score: 6  Discharge Goal: Independent     12 Steps  Comment: max tolerance during entire stay at ARU was 5 steps (limited by fatigue; pt was not performing this task at baseline)  Reason if not Attempted: Not attempted due to medical condition or safety concerns  CARE Score: 88  Discharge Goal: Not Applicable       Wheelchair:  w/c Ability: Wheelchair Ability  Uses a Wheelchair and/or Scooter?: No                Balance:        Object: Picking Up Object  Assistance Needed: Independent  Comment: Mod Ind with rollator and reacher  CARE Score: 6  Discharge Goal: Independent    I      Exam:    Blood pressure (!) 143/61, pulse 65, temperature 98.1 °F (36.7 °C), temperature source Oral, resp. rate 18, height 5' 7.99\" (1.727 m), weight 210 lb (95.3 kg), SpO2 96 %. General: Up in a wheelchair. Oriented x3. In no distress. Anxious to get home. Impulsive with poor reasoning. HEENT: Gazing right and left. Neck supple. No JVD or adenopathy. MMM. Pulmonary: Unlabored without wheezes, rales or coughing. Cardiac: Regular rate and rhythm. Abdomen: Patient's abdomen is soft and nondistended. Bowel sounds were present throughout. There was no rebound, guarding or masses noted. SPC drains clear urine. Upper extremities: No new bruising or swelling. Fair strength bilaterally. Lower extremities: 4+/5 strength bilaterally. No signs of DVT. Sitting balance was good.   Standing balance was fair-.    Lab Results   Component Value Date    WBC 6.5 11/18/2021    HGB 9.6 (L) 11/18/2021    HCT 31.4 (L) 11/18/2021    MCV 94.0 11/18/2021     11/18/2021     Lab Results   Component Value Date    INR 0.99 05/19/2015    INR 1.31 03/23/2013    INR 1.37 01/24/2011    PROTIME 11.3 05/19/2015    PROTIME 14.2 03/23/2013    PROTIME 15.2 (H) 01/24/2011     Lab Results   Component Value Date CREATININE 1.7 (H) 11/19/2021    BUN 27 (H) 11/19/2021     11/19/2021    K 4.6 11/19/2021     11/19/2021    CO2 22 11/19/2021     Lab Results   Component Value Date    ALT 16 11/17/2021    AST 18 11/17/2021    ALKPHOS 98 11/17/2021    BILITOT 0.3 11/17/2021           The patient presented to the ARU with the above history requiring a multidisciplinary treatment plan including close medical supervision by the [facility] Director. The patient participated in the prescribed therapy treatment plan with reasonable compliance and progressive tolerance. They avoided significant medical complications. By the time of discharge the patient had become safer with adaptive equipment to transfer and toilet with a FWW with the supervision of family/caregivers. The patient was tolerating an oral diet without choking/coughing and was back to their baseline with regards to bowel and bladder control. Discharge instructions were reviewed with the patient and family. The patient is to follow up with the PCP in 1 week. No driving. [facility] PT/OT/RN will be arranged. Medication List      START taking these medications    hydrALAZINE 25 MG tablet  Commonly known as: APRESOLINE  Take 1 tablet by mouth every 8 hours        CHANGE how you take these medications    amLODIPine 10 MG tablet  Commonly known as: NORVASC  Take 1 tablet by mouth daily  What changed:   · medication strength  · how much to take     labetalol 200 MG tablet  Commonly known as: NORMODYNE  Take 1 tablet by mouth every 12 hours  What changed:   · medication strength  · how much to take  · when to take this        CONTINUE taking these medications    docusate 100 MG Caps  Commonly known as: COLACE, DULCOLAX  Take 100 mg by mouth 2 times daily as needed for Constipation.      glimepiride 4 MG tablet  Commonly known as: AMARYL     * LEVEMIR SC     * LEVEMIR FLEXPEN SC     lisinopril 5 MG tablet  Commonly known as: PRINIVIL;ZESTRIL  Take 1 tablet by mouth daily     oxybutynin 5 MG extended release tablet  Commonly known as: Ditropan XL  Take 1 tablet by mouth daily for 7 days         * This list has 2 medication(s) that are the same as other medications prescribed for you. Read the directions carefully, and ask your doctor or other care provider to review them with you. Where to Get Your Medications      You can get these medications from any pharmacy    Bring a paper prescription for each of these medications  · amLODIPine 10 MG tablet  · hydrALAZINE 25 MG tablet  · labetalol 200 MG tablet         CONDITION ON DISCHARGE: Stable. The prognosis is fair- for further improvements in ADL's and safety with adapted gait/transfers. Record review, patient exam, discharge instructions, medication reconciliation and summary for this discharge visit took more than 30 minutes. Additional Area 2 Location: chin

## 2023-03-06 ENCOUNTER — HOSPITAL ENCOUNTER (OUTPATIENT)
Age: 83
Setting detail: SPECIMEN
Discharge: HOME | End: 2023-03-06
Payer: MEDICARE

## 2023-03-06 LAB
BASOPHILS ABSOLUTE: 0 K/CU MM
BASOPHILS RELATIVE PERCENT: 0.4 % (ref 0–1)
DIFFERENTIAL TYPE: ABNORMAL
EOSINOPHILS ABSOLUTE: 0.5 K/CU MM
EOSINOPHILS RELATIVE PERCENT: 6.7 % (ref 0–3)
FERRITIN: 244 NG/ML (ref 30–400)
FOLATE SERPL-MCNC: 10.7 NG/ML (ref 3.1–17.5)
HCT VFR BLD CALC: 23.4 % (ref 42–52)
HEMOGLOBIN: 7.1 GM/DL (ref 13.5–18)
IMMATURE NEUTROPHIL %: 0.3 % (ref 0–0.43)
IRON: 19 UG/DL (ref 59–158)
LYMPHOCYTES ABSOLUTE: 1.4 K/CU MM
LYMPHOCYTES RELATIVE PERCENT: 20.8 % (ref 24–44)
MCH RBC QN AUTO: 29.7 PG (ref 27–31)
MCHC RBC AUTO-ENTMCNC: 30.3 % (ref 32–36)
MCV RBC AUTO: 97.9 FL (ref 78–100)
MONOCYTES ABSOLUTE: 0.6 K/CU MM
MONOCYTES RELATIVE PERCENT: 9.3 % (ref 0–4)
NUCLEATED RBC %: 0 %
PCT TRANSFERRIN: 9 % (ref 10–44)
PDW BLD-RTO: 15.8 % (ref 11.7–14.9)
PLATELET # BLD: 172 K/CU MM (ref 140–440)
PMV BLD AUTO: 11.9 FL (ref 7.5–11.1)
RBC # BLD: 2.39 M/CU MM (ref 4.6–6.2)
SEGMENTED NEUTROPHILS ABSOLUTE COUNT: 4.3 K/CU MM
SEGMENTED NEUTROPHILS RELATIVE PERCENT: 62.5 % (ref 36–66)
TOTAL IMMATURE NEUTOROPHIL: 0.02 K/CU MM
TOTAL IRON BINDING CAPACITY: 206 UG/DL (ref 250–450)
TOTAL NUCLEATED RBC: 0 K/CU MM
UNSATURATED IRON BINDING CAPACITY: 187 UG/DL (ref 110–370)
WBC # BLD: 6.9 K/CU MM (ref 4–10.5)

## 2023-03-06 PROCEDURE — 83540 ASSAY OF IRON: CPT

## 2023-03-06 PROCEDURE — 36415 COLL VENOUS BLD VENIPUNCTURE: CPT

## 2023-03-06 PROCEDURE — 82728 ASSAY OF FERRITIN: CPT

## 2023-03-06 PROCEDURE — 85025 COMPLETE CBC W/AUTO DIFF WBC: CPT

## 2023-03-06 PROCEDURE — 82746 ASSAY OF FOLIC ACID SERUM: CPT

## 2023-03-06 PROCEDURE — 82607 VITAMIN B-12: CPT

## 2023-03-06 PROCEDURE — 83550 IRON BINDING TEST: CPT

## 2023-03-07 LAB — VITAMIN B-12: 478.4 PG/ML (ref 211–911)

## 2023-03-13 ENCOUNTER — HOSPITAL ENCOUNTER (OUTPATIENT)
Dept: INFUSION THERAPY | Age: 83
Discharge: HOME | End: 2023-03-13
Payer: MEDICARE

## 2023-03-13 ENCOUNTER — OFFICE VISIT (OUTPATIENT)
Dept: ONCOLOGY | Age: 83
End: 2023-03-13
Payer: MEDICARE

## 2023-03-13 VITALS
DIASTOLIC BLOOD PRESSURE: 80 MMHG | SYSTOLIC BLOOD PRESSURE: 148 MMHG | OXYGEN SATURATION: 98 % | WEIGHT: 211 LBS | RESPIRATION RATE: 18 BRPM | HEIGHT: 68 IN | HEART RATE: 67 BPM | TEMPERATURE: 97.5 F | BODY MASS INDEX: 31.98 KG/M2

## 2023-03-13 DIAGNOSIS — D64.9 ANEMIA, UNSPECIFIED TYPE: Primary | ICD-10-CM

## 2023-03-13 DIAGNOSIS — D64.9 ANEMIA, UNSPECIFIED TYPE: ICD-10-CM

## 2023-03-13 LAB
BASOPHILS ABSOLUTE: 0 K/CU MM
BASOPHILS RELATIVE PERCENT: 0.3 % (ref 0–1)
DIFFERENTIAL TYPE: ABNORMAL
EOSINOPHILS ABSOLUTE: 0.5 K/CU MM
EOSINOPHILS RELATIVE PERCENT: 7.5 % (ref 0–3)
FERRITIN: 289 NG/ML (ref 30–400)
FOLATE SERPL-MCNC: 12.9 NG/ML (ref 3.1–17.5)
HCT VFR BLD CALC: 25.7 % (ref 42–52)
HEMOGLOBIN: 7.8 GM/DL (ref 13.5–18)
IRON: 47 UG/DL (ref 59–158)
LYMPHOCYTES ABSOLUTE: 1.4 K/CU MM
LYMPHOCYTES RELATIVE PERCENT: 21.7 % (ref 24–44)
MCH RBC QN AUTO: 29.4 PG (ref 27–31)
MCHC RBC AUTO-ENTMCNC: 30.4 % (ref 32–36)
MCV RBC AUTO: 97 FL (ref 78–100)
MONOCYTES ABSOLUTE: 0.6 K/CU MM
MONOCYTES RELATIVE PERCENT: 10.3 % (ref 0–4)
PCT TRANSFERRIN: 20 % (ref 10–44)
PDW BLD-RTO: 15 % (ref 11.7–14.9)
PLATELET # BLD: 211 K/CU MM (ref 140–440)
PMV BLD AUTO: 10.9 FL (ref 7.5–11.1)
RBC # BLD: 2.65 M/CU MM (ref 4.6–6.2)
SEGMENTED NEUTROPHILS ABSOLUTE COUNT: 3.8 K/CU MM
SEGMENTED NEUTROPHILS RELATIVE PERCENT: 60.2 % (ref 36–66)
TOTAL IRON BINDING CAPACITY: 237 UG/DL (ref 250–450)
UNSATURATED IRON BINDING CAPACITY: 190 UG/DL (ref 110–370)
VITAMIN B-12: 724.5 PG/ML (ref 211–911)
WBC # BLD: 6.2 K/CU MM (ref 4–10.5)

## 2023-03-13 PROCEDURE — 83550 IRON BINDING TEST: CPT

## 2023-03-13 PROCEDURE — 82668 ASSAY OF ERYTHROPOIETIN: CPT

## 2023-03-13 PROCEDURE — 99213 OFFICE O/P EST LOW 20 MIN: CPT | Performed by: INTERNAL MEDICINE

## 2023-03-13 PROCEDURE — 82746 ASSAY OF FOLIC ACID SERUM: CPT

## 2023-03-13 PROCEDURE — 83540 ASSAY OF IRON: CPT

## 2023-03-13 PROCEDURE — G8484 FLU IMMUNIZE NO ADMIN: HCPCS | Performed by: INTERNAL MEDICINE

## 2023-03-13 PROCEDURE — 1036F TOBACCO NON-USER: CPT | Performed by: INTERNAL MEDICINE

## 2023-03-13 PROCEDURE — 82607 VITAMIN B-12: CPT

## 2023-03-13 PROCEDURE — 36415 COLL VENOUS BLD VENIPUNCTURE: CPT

## 2023-03-13 PROCEDURE — G8427 DOCREV CUR MEDS BY ELIG CLIN: HCPCS | Performed by: INTERNAL MEDICINE

## 2023-03-13 PROCEDURE — 1123F ACP DISCUSS/DSCN MKR DOCD: CPT | Performed by: INTERNAL MEDICINE

## 2023-03-13 PROCEDURE — 3077F SYST BP >= 140 MM HG: CPT | Performed by: INTERNAL MEDICINE

## 2023-03-13 PROCEDURE — 3079F DIAST BP 80-89 MM HG: CPT | Performed by: INTERNAL MEDICINE

## 2023-03-13 PROCEDURE — 85025 COMPLETE CBC W/AUTO DIFF WBC: CPT

## 2023-03-13 PROCEDURE — 82728 ASSAY OF FERRITIN: CPT

## 2023-03-13 PROCEDURE — 99211 OFF/OP EST MAY X REQ PHY/QHP: CPT

## 2023-03-13 PROCEDURE — G8417 CALC BMI ABV UP PARAM F/U: HCPCS | Performed by: INTERNAL MEDICINE

## 2023-03-13 NOTE — PROGRESS NOTES
MA Rooming Questions  Patient: Yoselyn Alfaro  MRN: [id number]    Date: 3/13/2023        1. Do you have any new issues?   no         2. Do you need any refills on medications?    no    3. Have you had any imaging done since your last visit?   no    4. Have you been hospitalized or seen in the emergency room since your last visit here?   no    5. Did the patient have a depression screening completed today?  No    No data recorded     PHQ-9 Given to (if applicable):               PHQ-9 Score (if applicable):                     [] Positive     []  Negative              Does question #9 need addressed (if applicable)                     [] Yes    []  No               Victor M Rodriguez MA

## 2023-03-14 LAB — EPO SERPL-ACNC: 19 MU/ML (ref 4–27)

## 2023-03-20 ENCOUNTER — HOSPITAL ENCOUNTER (OUTPATIENT)
Age: 83
Setting detail: SPECIMEN
Discharge: HOME | End: 2023-03-20
Payer: MEDICARE

## 2023-03-20 LAB
ALBUMIN SERPL-MCNC: 3.2 GM/DL (ref 3.4–5)
ALP BLD-CCNC: 102 IU/L (ref 40–128)
ALT SERPL-CCNC: 19 U/L (ref 10–40)
ANION GAP SERPL CALCULATED.3IONS-SCNC: 7 MMOL/L (ref 4–16)
AST SERPL-CCNC: 23 IU/L (ref 15–37)
BILIRUB SERPL-MCNC: 0.2 MG/DL (ref 0–1)
BUN SERPL-MCNC: 31 MG/DL (ref 6–23)
CALCIUM SERPL-MCNC: 8.2 MG/DL (ref 8.3–10.6)
CHLORIDE BLD-SCNC: 106 MMOL/L (ref 99–110)
CO2: 25 MMOL/L (ref 21–32)
CREAT SERPL-MCNC: 2.1 MG/DL (ref 0.9–1.3)
GFR SERPL CREATININE-BSD FRML MDRD: 30 ML/MIN/1.73M2
GLUCOSE SERPL-MCNC: 256 MG/DL (ref 70–99)
MAGNESIUM: 1.9 MG/DL (ref 1.8–2.4)
PHOSPHORUS: 3.8 MG/DL (ref 2.5–4.9)
POTASSIUM SERPL-SCNC: 4.8 MMOL/L (ref 3.5–5.1)
SODIUM BLD-SCNC: 138 MMOL/L (ref 135–145)
TOTAL PROTEIN: 5.4 GM/DL (ref 6.4–8.2)

## 2023-03-20 PROCEDURE — 84100 ASSAY OF PHOSPHORUS: CPT

## 2023-03-20 PROCEDURE — 36415 COLL VENOUS BLD VENIPUNCTURE: CPT

## 2023-03-20 PROCEDURE — 83735 ASSAY OF MAGNESIUM: CPT

## 2023-03-20 PROCEDURE — 80053 COMPREHEN METABOLIC PANEL: CPT

## 2023-03-21 ENCOUNTER — HOSPITAL ENCOUNTER (OUTPATIENT)
Age: 83
Setting detail: SPECIMEN
Discharge: HOME | End: 2023-03-21
Payer: MEDICARE

## 2023-03-21 LAB
HCT VFR BLD CALC: 22 % (ref 42–52)
HEMOGLOBIN: 6.7 GM/DL (ref 13.5–18)
MCH RBC QN AUTO: 29.1 PG (ref 27–31)
MCHC RBC AUTO-ENTMCNC: 30.5 % (ref 32–36)
MCV RBC AUTO: 95.7 FL (ref 78–100)
PDW BLD-RTO: 15.4 % (ref 11.7–14.9)
PLATELET # BLD: 185 K/CU MM (ref 140–440)
PMV BLD AUTO: 11.8 FL (ref 7.5–11.1)
RBC # BLD: 2.3 M/CU MM (ref 4.6–6.2)
WBC # BLD: 6.1 K/CU MM (ref 4–10.5)

## 2023-03-21 PROCEDURE — 36415 COLL VENOUS BLD VENIPUNCTURE: CPT

## 2023-03-21 PROCEDURE — 85027 COMPLETE CBC AUTOMATED: CPT

## 2023-03-22 ENCOUNTER — HOSPITAL ENCOUNTER (OUTPATIENT)
Dept: INFUSION THERAPY | Age: 83
Setting detail: INFUSION SERIES
Discharge: HOME | End: 2023-03-22
Payer: MEDICARE

## 2023-03-22 VITALS
SYSTOLIC BLOOD PRESSURE: 133 MMHG | OXYGEN SATURATION: 98 % | DIASTOLIC BLOOD PRESSURE: 92 MMHG | HEART RATE: 66 BPM | RESPIRATION RATE: 16 BRPM | TEMPERATURE: 97.3 F

## 2023-03-22 PROCEDURE — 2580000003 HC RX 258: Performed by: FAMILY MEDICINE

## 2023-03-22 PROCEDURE — 36430 TRANSFUSION BLD/BLD COMPNT: CPT

## 2023-03-22 PROCEDURE — 86922 COMPATIBILITY TEST ANTIGLOB: CPT

## 2023-03-22 PROCEDURE — 86900 BLOOD TYPING SEROLOGIC ABO: CPT

## 2023-03-22 PROCEDURE — 86850 RBC ANTIBODY SCREEN: CPT

## 2023-03-22 PROCEDURE — P9016 RBC LEUKOCYTES REDUCED: HCPCS

## 2023-03-22 PROCEDURE — 99211 OFF/OP EST MAY X REQ PHY/QHP: CPT

## 2023-03-22 PROCEDURE — 86901 BLOOD TYPING SEROLOGIC RH(D): CPT

## 2023-03-22 RX ORDER — SODIUM CHLORIDE 9 MG/ML
INJECTION, SOLUTION INTRAVENOUS PRN
Status: DISCONTINUED | OUTPATIENT
Start: 2023-03-22 | End: 2023-03-23 | Stop reason: HOSPADM

## 2023-03-22 RX ORDER — SODIUM CHLORIDE 0.9 % (FLUSH) 0.9 %
5-40 SYRINGE (ML) INJECTION PRN
Status: DISCONTINUED | OUTPATIENT
Start: 2023-03-22 | End: 2023-03-23 | Stop reason: HOSPADM

## 2023-03-22 RX ADMIN — SODIUM CHLORIDE, PRESERVATIVE FREE 10 ML: 5 INJECTION INTRAVENOUS at 12:05

## 2023-03-22 RX ADMIN — SODIUM CHLORIDE, PRESERVATIVE FREE 10 ML: 5 INJECTION INTRAVENOUS at 07:55

## 2023-03-23 ENCOUNTER — HOSPITAL ENCOUNTER (OUTPATIENT)
Age: 83
Setting detail: SPECIMEN
Discharge: HOME | End: 2023-03-23
Payer: MEDICARE

## 2023-03-23 LAB
ABO/RH: NORMAL
ANTIBODY SCREEN: NEGATIVE
COMPONENT: NORMAL
CROSSMATCH RESULT: NORMAL
HCT VFR BLD CALC: 25.5 % (ref 42–52)
HEMOGLOBIN: 8 GM/DL (ref 13.5–18)
MCH RBC QN AUTO: 29.4 PG (ref 27–31)
MCHC RBC AUTO-ENTMCNC: 31.4 % (ref 32–36)
MCV RBC AUTO: 93.8 FL (ref 78–100)
PDW BLD-RTO: 15.1 % (ref 11.7–14.9)
PLATELET # BLD: 188 K/CU MM (ref 140–440)
PMV BLD AUTO: 12.2 FL (ref 7.5–11.1)
RBC # BLD: 2.72 M/CU MM (ref 4.6–6.2)
STATUS: NORMAL
TRANSFUSION STATUS: NORMAL
UNIT DIVISION: 0
UNIT NUMBER: NORMAL
WBC # BLD: 5.7 K/CU MM (ref 4–10.5)

## 2023-03-23 PROCEDURE — 36415 COLL VENOUS BLD VENIPUNCTURE: CPT

## 2023-03-23 PROCEDURE — 85027 COMPLETE CBC AUTOMATED: CPT

## 2023-03-30 ENCOUNTER — HOSPITAL ENCOUNTER (OUTPATIENT)
Age: 83
Setting detail: SPECIMEN
Discharge: HOME | End: 2023-03-30

## 2023-03-30 ENCOUNTER — HOSPITAL ENCOUNTER (OUTPATIENT)
Age: 83
Setting detail: SPECIMEN
Discharge: HOME | End: 2023-03-30
Payer: MEDICARE

## 2023-03-30 LAB
ANION GAP SERPL CALCULATED.3IONS-SCNC: 9 MMOL/L (ref 4–16)
BUN SERPL-MCNC: 46 MG/DL (ref 6–23)
CALCIUM SERPL-MCNC: 8.2 MG/DL (ref 8.3–10.6)
CHLORIDE BLD-SCNC: 107 MMOL/L (ref 99–110)
CO2: 22 MMOL/L (ref 21–32)
CREAT SERPL-MCNC: 2.7 MG/DL (ref 0.9–1.3)
GFR SERPL CREATININE-BSD FRML MDRD: 23 ML/MIN/1.73M2
GLUCOSE SERPL-MCNC: 265 MG/DL (ref 70–99)
HCT VFR BLD CALC: 25.1 % (ref 42–52)
HEMOGLOBIN: 7.9 GM/DL (ref 13.5–18)
MCH RBC QN AUTO: 29.5 PG (ref 27–31)
MCHC RBC AUTO-ENTMCNC: 31.5 % (ref 32–36)
MCV RBC AUTO: 93.7 FL (ref 78–100)
PDW BLD-RTO: 15 % (ref 11.7–14.9)
PLATELET # BLD: 160 K/CU MM (ref 140–440)
PMV BLD AUTO: 11.8 FL (ref 7.5–11.1)
POTASSIUM SERPL-SCNC: 5.4 MMOL/L (ref 3.5–5.1)
RBC # BLD: 2.68 M/CU MM (ref 4.6–6.2)
SODIUM BLD-SCNC: 138 MMOL/L (ref 135–145)
WBC # BLD: 6.9 K/CU MM (ref 4–10.5)

## 2023-03-30 PROCEDURE — 81001 URINALYSIS AUTO W/SCOPE: CPT

## 2023-03-30 PROCEDURE — 87086 URINE CULTURE/COLONY COUNT: CPT

## 2023-03-30 PROCEDURE — 87186 SC STD MICRODIL/AGAR DIL: CPT

## 2023-03-30 PROCEDURE — 36415 COLL VENOUS BLD VENIPUNCTURE: CPT

## 2023-03-30 PROCEDURE — 9900360100 HC STAT COLLECTION FEE SNF

## 2023-03-30 PROCEDURE — 80048 BASIC METABOLIC PNL TOTAL CA: CPT

## 2023-03-30 PROCEDURE — 87077 CULTURE AEROBIC IDENTIFY: CPT

## 2023-03-30 PROCEDURE — 85027 COMPLETE CBC AUTOMATED: CPT

## 2023-03-31 ENCOUNTER — HOSPITAL ENCOUNTER (OUTPATIENT)
Age: 83
Setting detail: SPECIMEN
Discharge: HOME | End: 2023-03-31
Payer: MEDICARE

## 2023-03-31 LAB
ANION GAP SERPL CALCULATED.3IONS-SCNC: 10 MMOL/L (ref 4–16)
BACTERIA: NEGATIVE /HPF
BILIRUBIN URINE: NEGATIVE MG/DL
BLOOD, URINE: ABNORMAL
BUN SERPL-MCNC: 44 MG/DL (ref 6–23)
CALCIUM SERPL-MCNC: 8.1 MG/DL (ref 8.3–10.6)
CHLORIDE BLD-SCNC: 107 MMOL/L (ref 99–110)
CLARITY: ABNORMAL
CO2: 22 MMOL/L (ref 21–32)
COLOR: YELLOW
CREAT SERPL-MCNC: 2.6 MG/DL (ref 0.9–1.3)
GFR SERPL CREATININE-BSD FRML MDRD: 24 ML/MIN/1.73M2
GLUCOSE SERPL-MCNC: 205 MG/DL (ref 70–99)
GLUCOSE, URINE: NEGATIVE MG/DL
HCT VFR BLD CALC: 26 % (ref 42–52)
HEMOGLOBIN: 8.2 GM/DL (ref 13.5–18)
KETONES, URINE: NEGATIVE MG/DL
LEUKOCYTE ESTERASE, URINE: ABNORMAL
MCH RBC QN AUTO: 30 PG (ref 27–31)
MCHC RBC AUTO-ENTMCNC: 31.5 % (ref 32–36)
MCV RBC AUTO: 95.2 FL (ref 78–100)
NITRITE URINE, QUANTITATIVE: NEGATIVE
PDW BLD-RTO: 15 % (ref 11.7–14.9)
PH, URINE: 6 (ref 5–8)
PLATELET # BLD: 180 K/CU MM (ref 140–440)
PMV BLD AUTO: 12.3 FL (ref 7.5–11.1)
POTASSIUM SERPL-SCNC: 5 MMOL/L (ref 3.5–5.1)
PROTEIN UA: 100 MG/DL
RBC # BLD: 2.73 M/CU MM (ref 4.6–6.2)
RBC URINE: ABNORMAL /HPF (ref 0–3)
SODIUM BLD-SCNC: 139 MMOL/L (ref 135–145)
SPECIFIC GRAVITY UA: 1.01 (ref 1–1.03)
TRICHOMONAS: ABNORMAL /HPF
UROBILINOGEN, URINE: 0.2 MG/DL (ref 0.2–1)
WBC # BLD: 6.5 K/CU MM (ref 4–10.5)
WBC CLUMP: ABNORMAL /HPF
WBC UA: 2073 /HPF (ref 0–2)

## 2023-03-31 PROCEDURE — 80048 BASIC METABOLIC PNL TOTAL CA: CPT

## 2023-03-31 PROCEDURE — 85027 COMPLETE CBC AUTOMATED: CPT

## 2023-03-31 PROCEDURE — 36415 COLL VENOUS BLD VENIPUNCTURE: CPT

## 2023-04-02 LAB
CULTURE: ABNORMAL
Lab: ABNORMAL
SPECIMEN: ABNORMAL

## 2023-04-03 ENCOUNTER — HOSPITAL ENCOUNTER (OUTPATIENT)
Age: 83
Setting detail: SPECIMEN
Discharge: HOME | End: 2023-04-03
Payer: MEDICARE

## 2023-04-03 LAB
ANION GAP SERPL CALCULATED.3IONS-SCNC: 11 MMOL/L (ref 4–16)
BUN SERPL-MCNC: 38 MG/DL (ref 6–23)
CALCIUM SERPL-MCNC: 8.7 MG/DL (ref 8.3–10.6)
CHLORIDE BLD-SCNC: 108 MMOL/L (ref 99–110)
CO2: 22 MMOL/L (ref 21–32)
CREAT SERPL-MCNC: 2.5 MG/DL (ref 0.9–1.3)
GFR SERPL CREATININE-BSD FRML MDRD: 25 ML/MIN/1.73M2
GLUCOSE SERPL-MCNC: 291 MG/DL (ref 70–99)
HCT VFR BLD CALC: 27.4 % (ref 42–52)
HEMOGLOBIN: 8.5 GM/DL (ref 13.5–18)
MCH RBC QN AUTO: 29.1 PG (ref 27–31)
MCHC RBC AUTO-ENTMCNC: 31 % (ref 32–36)
MCV RBC AUTO: 93.8 FL (ref 78–100)
PDW BLD-RTO: 14.5 % (ref 11.7–14.9)
PLATELET # BLD: 191 K/CU MM (ref 140–440)
PMV BLD AUTO: 11.8 FL (ref 7.5–11.1)
POTASSIUM SERPL-SCNC: 5.8 MMOL/L (ref 3.5–5.1)
RBC # BLD: 2.92 M/CU MM (ref 4.6–6.2)
SODIUM BLD-SCNC: 141 MMOL/L (ref 135–145)
WBC # BLD: 5.4 K/CU MM (ref 4–10.5)

## 2023-04-03 PROCEDURE — 36415 COLL VENOUS BLD VENIPUNCTURE: CPT

## 2023-04-03 PROCEDURE — 85027 COMPLETE CBC AUTOMATED: CPT

## 2023-04-03 PROCEDURE — 80048 BASIC METABOLIC PNL TOTAL CA: CPT

## 2023-04-04 ENCOUNTER — HOSPITAL ENCOUNTER (OUTPATIENT)
Age: 83
Setting detail: SPECIMEN
Discharge: HOME | End: 2023-04-04
Payer: MEDICARE

## 2023-04-04 LAB — POTASSIUM SERPL-SCNC: 4.8 MMOL/L (ref 3.5–5.1)

## 2023-04-04 PROCEDURE — 36415 COLL VENOUS BLD VENIPUNCTURE: CPT

## 2023-04-04 PROCEDURE — 84132 ASSAY OF SERUM POTASSIUM: CPT

## 2023-04-07 ENCOUNTER — HOSPITAL ENCOUNTER (OUTPATIENT)
Age: 83
Setting detail: SPECIMEN
Discharge: HOME | End: 2023-04-07
Payer: MEDICARE

## 2023-04-07 LAB
ANION GAP SERPL CALCULATED.3IONS-SCNC: 10 MMOL/L (ref 4–16)
BUN SERPL-MCNC: 34 MG/DL (ref 6–23)
CALCIUM SERPL-MCNC: 8.5 MG/DL (ref 8.3–10.6)
CHLORIDE BLD-SCNC: 106 MMOL/L (ref 99–110)
CO2: 25 MMOL/L (ref 21–32)
CREAT SERPL-MCNC: 2.6 MG/DL (ref 0.9–1.3)
GFR SERPL CREATININE-BSD FRML MDRD: 24 ML/MIN/1.73M2
GLUCOSE SERPL-MCNC: 216 MG/DL (ref 70–99)
POTASSIUM SERPL-SCNC: 4.7 MMOL/L (ref 3.5–5.1)
SODIUM BLD-SCNC: 141 MMOL/L (ref 135–145)

## 2023-04-07 PROCEDURE — 80048 BASIC METABOLIC PNL TOTAL CA: CPT

## 2023-04-07 PROCEDURE — 36415 COLL VENOUS BLD VENIPUNCTURE: CPT

## 2023-04-17 ENCOUNTER — HOSPITAL ENCOUNTER (OUTPATIENT)
Age: 83
Setting detail: SPECIMEN
Discharge: HOME | End: 2023-04-17
Payer: MEDICARE

## 2023-04-17 LAB
ANION GAP SERPL CALCULATED.3IONS-SCNC: 8 MMOL/L (ref 4–16)
BUN SERPL-MCNC: 38 MG/DL (ref 6–23)
CALCIUM SERPL-MCNC: 8.4 MG/DL (ref 8.3–10.6)
CHLORIDE BLD-SCNC: 109 MMOL/L (ref 99–110)
CO2: 24 MMOL/L (ref 21–32)
CREAT SERPL-MCNC: 2.4 MG/DL (ref 0.9–1.3)
GFR SERPL CREATININE-BSD FRML MDRD: 26 ML/MIN/1.73M2
GLUCOSE SERPL-MCNC: 263 MG/DL (ref 70–99)
HCT VFR BLD CALC: 27.8 % (ref 42–52)
HEMOGLOBIN: 8.5 GM/DL (ref 13.5–18)
MCH RBC QN AUTO: 29.4 PG (ref 27–31)
MCHC RBC AUTO-ENTMCNC: 30.6 % (ref 32–36)
MCV RBC AUTO: 96.2 FL (ref 78–100)
PDW BLD-RTO: 15.7 % (ref 11.7–14.9)
PLATELET # BLD: 219 K/CU MM (ref 140–440)
PMV BLD AUTO: 11.8 FL (ref 7.5–11.1)
POTASSIUM SERPL-SCNC: 5.3 MMOL/L (ref 3.5–5.1)
RBC # BLD: 2.89 M/CU MM (ref 4.6–6.2)
SODIUM BLD-SCNC: 141 MMOL/L (ref 135–145)
WBC # BLD: 7.1 K/CU MM (ref 4–10.5)

## 2023-04-17 PROCEDURE — 85027 COMPLETE CBC AUTOMATED: CPT

## 2023-04-17 PROCEDURE — 36415 COLL VENOUS BLD VENIPUNCTURE: CPT

## 2023-04-17 PROCEDURE — 80048 BASIC METABOLIC PNL TOTAL CA: CPT

## 2023-04-21 ENCOUNTER — HOSPITAL ENCOUNTER (OUTPATIENT)
Age: 83
Setting detail: SPECIMEN
Discharge: HOME | End: 2023-04-21
Payer: MEDICARE

## 2023-04-21 LAB
ANION GAP SERPL CALCULATED.3IONS-SCNC: 10 MMOL/L (ref 4–16)
BUN SERPL-MCNC: 36 MG/DL (ref 6–23)
CALCIUM SERPL-MCNC: 8.2 MG/DL (ref 8.3–10.6)
CHLORIDE BLD-SCNC: 105 MMOL/L (ref 99–110)
CO2: 24 MMOL/L (ref 21–32)
CREAT SERPL-MCNC: 2.2 MG/DL (ref 0.9–1.3)
GFR SERPL CREATININE-BSD FRML MDRD: 29 ML/MIN/1.73M2
GLUCOSE SERPL-MCNC: 202 MG/DL (ref 70–99)
POTASSIUM SERPL-SCNC: 5.2 MMOL/L (ref 3.5–5.1)
SODIUM BLD-SCNC: 139 MMOL/L (ref 135–145)

## 2023-04-21 PROCEDURE — 80048 BASIC METABOLIC PNL TOTAL CA: CPT

## 2023-04-24 ENCOUNTER — HOSPITAL ENCOUNTER (OUTPATIENT)
Age: 83
Setting detail: SPECIMEN
Discharge: HOME | End: 2023-04-24
Payer: MEDICARE

## 2023-04-24 LAB
ALBUMIN SERPL-MCNC: 3.2 GM/DL (ref 3.4–5)
ALP BLD-CCNC: 101 IU/L (ref 40–129)
ALT SERPL-CCNC: 12 U/L (ref 10–40)
ANION GAP SERPL CALCULATED.3IONS-SCNC: 12 MMOL/L (ref 4–16)
AST SERPL-CCNC: 22 IU/L (ref 15–37)
BILIRUB SERPL-MCNC: 0.2 MG/DL (ref 0–1)
BUN SERPL-MCNC: 39 MG/DL (ref 6–23)
CALCIUM SERPL-MCNC: 8.4 MG/DL (ref 8.3–10.6)
CHLORIDE BLD-SCNC: 103 MMOL/L (ref 99–110)
CO2: 24 MMOL/L (ref 21–32)
CREAT SERPL-MCNC: 2.3 MG/DL (ref 0.9–1.3)
GFR SERPL CREATININE-BSD FRML MDRD: 27 ML/MIN/1.73M2
GLUCOSE SERPL-MCNC: 195 MG/DL (ref 70–99)
HCT VFR BLD CALC: 27.2 % (ref 42–52)
HEMOGLOBIN: 8.5 GM/DL (ref 13.5–18)
MCH RBC QN AUTO: 29.5 PG (ref 27–31)
MCHC RBC AUTO-ENTMCNC: 31.3 % (ref 32–36)
MCV RBC AUTO: 94.4 FL (ref 78–100)
PDW BLD-RTO: 15.9 % (ref 11.7–14.9)
PLATELET # BLD: 189 K/CU MM (ref 140–440)
PMV BLD AUTO: 12.3 FL (ref 7.5–11.1)
POTASSIUM SERPL-SCNC: 4.6 MMOL/L (ref 3.5–5.1)
RBC # BLD: 2.88 M/CU MM (ref 4.6–6.2)
SODIUM BLD-SCNC: 139 MMOL/L (ref 135–145)
TOTAL PROTEIN: 5.8 GM/DL (ref 6.4–8.2)
WBC # BLD: 7.8 K/CU MM (ref 4–10.5)

## 2023-04-24 PROCEDURE — 85027 COMPLETE CBC AUTOMATED: CPT

## 2023-04-24 PROCEDURE — 80053 COMPREHEN METABOLIC PANEL: CPT

## 2023-05-11 ENCOUNTER — HOSPITAL ENCOUNTER (OUTPATIENT)
Age: 83
Setting detail: SPECIMEN
Discharge: HOME | End: 2023-05-11

## 2023-05-11 LAB
ANION GAP SERPL CALCULATED.3IONS-SCNC: 11 MMOL/L (ref 4–16)
BACTERIA: ABNORMAL /HPF
BILIRUBIN URINE: NEGATIVE MG/DL
BLOOD, URINE: ABNORMAL
BUN SERPL-MCNC: 43 MG/DL (ref 6–23)
CALCIUM SERPL-MCNC: 8.7 MG/DL (ref 8.3–10.6)
CHLORIDE BLD-SCNC: 108 MMOL/L (ref 99–110)
CLARITY: ABNORMAL
CO2: 25 MMOL/L (ref 21–32)
COLOR: YELLOW
CREAT SERPL-MCNC: 2.3 MG/DL (ref 0.9–1.3)
GFR SERPL CREATININE-BSD FRML MDRD: 27 ML/MIN/1.73M2
GLUCOSE SERPL-MCNC: 131 MG/DL (ref 70–99)
GLUCOSE, URINE: NEGATIVE MG/DL
HCT VFR BLD CALC: 28.8 % (ref 42–52)
HEMOGLOBIN: 8.9 GM/DL (ref 13.5–18)
KETONES, URINE: NEGATIVE MG/DL
LEUKOCYTE ESTERASE, URINE: ABNORMAL
MCH RBC QN AUTO: 29.5 PG (ref 27–31)
MCHC RBC AUTO-ENTMCNC: 30.9 % (ref 32–36)
MCV RBC AUTO: 95.4 FL (ref 78–100)
NITRITE URINE, QUANTITATIVE: POSITIVE
PDW BLD-RTO: 17.1 % (ref 11.7–14.9)
PH, URINE: 6.5 (ref 5–8)
PLATELET # BLD: 172 K/CU MM (ref 140–440)
PMV BLD AUTO: 12.3 FL (ref 7.5–11.1)
POTASSIUM SERPL-SCNC: 5.1 MMOL/L (ref 3.5–5.1)
PROTEIN UA: 30 MG/DL
RBC # BLD: 3.02 M/CU MM (ref 4.6–6.2)
RBC URINE: 38 /HPF (ref 0–3)
SODIUM BLD-SCNC: 144 MMOL/L (ref 135–145)
SPECIFIC GRAVITY UA: 1.01 (ref 1–1.03)
TRICHOMONAS: ABNORMAL /HPF
UROBILINOGEN, URINE: 0.2 MG/DL (ref 0.2–1)
WBC # BLD: 6.6 K/CU MM (ref 4–10.5)
WBC CLUMP: ABNORMAL /HPF
WBC UA: 214 /HPF (ref 0–2)

## 2023-05-11 PROCEDURE — 85027 COMPLETE CBC AUTOMATED: CPT

## 2023-05-11 PROCEDURE — 87086 URINE CULTURE/COLONY COUNT: CPT

## 2023-05-11 PROCEDURE — 81001 URINALYSIS AUTO W/SCOPE: CPT

## 2023-05-11 PROCEDURE — 87088 URINE BACTERIA CULTURE: CPT

## 2023-05-11 PROCEDURE — 87186 SC STD MICRODIL/AGAR DIL: CPT

## 2023-05-11 PROCEDURE — 80048 BASIC METABOLIC PNL TOTAL CA: CPT

## 2023-05-14 LAB
CULTURE: ABNORMAL
CULTURE: ABNORMAL
Lab: ABNORMAL
SPECIMEN: ABNORMAL

## 2023-05-15 ENCOUNTER — HOSPITAL ENCOUNTER (OUTPATIENT)
Age: 83
Setting detail: SPECIMEN
Discharge: HOME | End: 2023-05-15

## 2023-05-15 LAB
ANION GAP SERPL CALCULATED.3IONS-SCNC: 13 MMOL/L (ref 4–16)
BUN SERPL-MCNC: 61 MG/DL (ref 6–23)
CALCIUM SERPL-MCNC: 8.8 MG/DL (ref 8.3–10.6)
CHLORIDE BLD-SCNC: 108 MMOL/L (ref 99–110)
CO2: 22 MMOL/L (ref 21–32)
CREAT SERPL-MCNC: 3.5 MG/DL (ref 0.9–1.3)
GFR SERPL CREATININE-BSD FRML MDRD: 17 ML/MIN/1.73M2
GLUCOSE SERPL-MCNC: 250 MG/DL (ref 70–99)
HCT VFR BLD CALC: 33.7 % (ref 42–52)
HEMOGLOBIN: 9.6 GM/DL (ref 13.5–18)
MCH RBC QN AUTO: 29.3 PG (ref 27–31)
MCHC RBC AUTO-ENTMCNC: 28.5 % (ref 32–36)
MCV RBC AUTO: 102.7 FL (ref 78–100)
PDW BLD-RTO: 17.7 % (ref 11.7–14.9)
PLATELET # BLD: 152 K/CU MM (ref 140–440)
PMV BLD AUTO: 11.8 FL (ref 7.5–11.1)
POTASSIUM SERPL-SCNC: 5.2 MMOL/L (ref 3.5–5.1)
RBC # BLD: 3.28 M/CU MM (ref 4.6–6.2)
SODIUM BLD-SCNC: 143 MMOL/L (ref 135–145)
WBC # BLD: 7.7 K/CU MM (ref 4–10.5)

## 2023-05-15 PROCEDURE — 80048 BASIC METABOLIC PNL TOTAL CA: CPT

## 2023-05-15 PROCEDURE — 36415 COLL VENOUS BLD VENIPUNCTURE: CPT

## 2023-05-15 PROCEDURE — 9900360100 HC STAT COLLECTION FEE SNF

## 2023-05-15 PROCEDURE — 85027 COMPLETE CBC AUTOMATED: CPT

## 2023-05-18 ENCOUNTER — HOSPITAL ENCOUNTER (OUTPATIENT)
Age: 83
Setting detail: SPECIMEN
Discharge: HOME | End: 2023-05-18
Payer: MEDICARE

## 2023-05-18 LAB
ANION GAP SERPL CALCULATED.3IONS-SCNC: 11 MMOL/L (ref 4–16)
BUN SERPL-MCNC: 56 MG/DL (ref 6–23)
CALCIUM SERPL-MCNC: 8.2 MG/DL (ref 8.3–10.6)
CHLORIDE BLD-SCNC: 111 MMOL/L (ref 99–110)
CO2: 24 MMOL/L (ref 21–32)
CREAT SERPL-MCNC: 3.1 MG/DL (ref 0.9–1.3)
GFR SERPL CREATININE-BSD FRML MDRD: 19 ML/MIN/1.73M2
GLUCOSE SERPL-MCNC: 156 MG/DL (ref 70–99)
POTASSIUM SERPL-SCNC: 4.5 MMOL/L (ref 3.5–5.1)
SODIUM BLD-SCNC: 146 MMOL/L (ref 135–145)

## 2023-05-18 PROCEDURE — 80048 BASIC METABOLIC PNL TOTAL CA: CPT

## 2023-05-18 PROCEDURE — 36415 COLL VENOUS BLD VENIPUNCTURE: CPT

## 2023-05-20 ENCOUNTER — HOSPITAL ENCOUNTER (OUTPATIENT)
Age: 83
Setting detail: SPECIMEN
Discharge: HOME | End: 2023-05-20

## 2023-05-20 LAB
ALBUMIN SERPL-MCNC: 3.1 GM/DL (ref 3.4–5)
ALP BLD-CCNC: 103 IU/L (ref 40–129)
ALT SERPL-CCNC: 18 U/L (ref 10–40)
AMMONIA: 30 UMOL/L (ref 16–60)
ANION GAP SERPL CALCULATED.3IONS-SCNC: 8 MMOL/L (ref 4–16)
AST SERPL-CCNC: 43 IU/L (ref 15–37)
BILIRUB SERPL-MCNC: 0.2 MG/DL (ref 0–1)
BUN SERPL-MCNC: 41 MG/DL (ref 6–23)
CALCIUM SERPL-MCNC: 8.4 MG/DL (ref 8.3–10.6)
CHLORIDE BLD-SCNC: 113 MMOL/L (ref 99–110)
CO2: 25 MMOL/L (ref 21–32)
CREAT SERPL-MCNC: 2.3 MG/DL (ref 0.9–1.3)
GFR SERPL CREATININE-BSD FRML MDRD: 27 ML/MIN/1.73M2
GLUCOSE SERPL-MCNC: 219 MG/DL (ref 70–99)
HCT VFR BLD CALC: 28.8 % (ref 42–52)
HEMOGLOBIN: 8.6 GM/DL (ref 13.5–18)
MCH RBC QN AUTO: 29 PG (ref 27–31)
MCHC RBC AUTO-ENTMCNC: 29.9 % (ref 32–36)
MCV RBC AUTO: 97 FL (ref 78–100)
PDW BLD-RTO: 17.2 % (ref 11.7–14.9)
PLATELET # BLD: 219 K/CU MM (ref 140–440)
PMV BLD AUTO: 11 FL (ref 7.5–11.1)
POTASSIUM SERPL-SCNC: 5 MMOL/L (ref 3.5–5.1)
RBC # BLD: 2.97 M/CU MM (ref 4.6–6.2)
SODIUM BLD-SCNC: 146 MMOL/L (ref 135–145)
TOTAL PROTEIN: 6 GM/DL (ref 6.4–8.2)
WBC # BLD: 5.4 K/CU MM (ref 4–10.5)

## 2023-05-20 PROCEDURE — 36415 COLL VENOUS BLD VENIPUNCTURE: CPT

## 2023-05-20 PROCEDURE — 85027 COMPLETE CBC AUTOMATED: CPT

## 2023-05-20 PROCEDURE — 82140 ASSAY OF AMMONIA: CPT

## 2023-05-20 PROCEDURE — 80053 COMPREHEN METABOLIC PANEL: CPT

## 2023-05-20 PROCEDURE — 9900360100 HC STAT COLLECTION FEE SNF

## 2023-05-21 ENCOUNTER — HOSPITAL ENCOUNTER (INPATIENT)
Age: 83
LOS: 5 days | Discharge: OTHER INSTITUTION | DRG: 071 | End: 2023-05-26
Attending: EMERGENCY MEDICINE | Admitting: STUDENT IN AN ORGANIZED HEALTH CARE EDUCATION/TRAINING PROGRAM
Payer: MEDICARE

## 2023-05-21 ENCOUNTER — APPOINTMENT (OUTPATIENT)
Dept: GENERAL RADIOLOGY | Age: 83
DRG: 071 | End: 2023-05-21
Payer: MEDICARE

## 2023-05-21 DIAGNOSIS — T83.511A URINARY TRACT INFECTION ASSOCIATED WITH INDWELLING URETHRAL CATHETER, INITIAL ENCOUNTER (HCC): Primary | ICD-10-CM

## 2023-05-21 DIAGNOSIS — N39.0 URINARY TRACT INFECTION ASSOCIATED WITH INDWELLING URETHRAL CATHETER, INITIAL ENCOUNTER (HCC): Primary | ICD-10-CM

## 2023-05-21 LAB
ALBUMIN SERPL-MCNC: 3 GM/DL (ref 3.4–5)
ALP BLD-CCNC: 106 IU/L (ref 40–129)
ALT SERPL-CCNC: 25 U/L (ref 10–40)
ANION GAP SERPL CALCULATED.3IONS-SCNC: 7 MMOL/L (ref 4–16)
AST SERPL-CCNC: 51 IU/L (ref 15–37)
BACTERIA: NEGATIVE /HPF
BASOPHILS ABSOLUTE: 0 K/CU MM
BASOPHILS RELATIVE PERCENT: 0.4 % (ref 0–1)
BILIRUB SERPL-MCNC: 0.3 MG/DL (ref 0–1)
BILIRUBIN URINE: NEGATIVE MG/DL
BLOOD, URINE: ABNORMAL
BUN SERPL-MCNC: 36 MG/DL (ref 6–23)
CALCIUM SERPL-MCNC: 8.7 MG/DL (ref 8.3–10.6)
CHLORIDE BLD-SCNC: 110 MMOL/L (ref 99–110)
CLARITY: ABNORMAL
CO2: 25 MMOL/L (ref 21–32)
COLOR: YELLOW
CREAT SERPL-MCNC: 2.2 MG/DL (ref 0.9–1.3)
DIFFERENTIAL TYPE: ABNORMAL
EOSINOPHILS ABSOLUTE: 0.5 K/CU MM
EOSINOPHILS RELATIVE PERCENT: 7.4 % (ref 0–3)
GFR SERPL CREATININE-BSD FRML MDRD: 29 ML/MIN/1.73M2
GLUCOSE SERPL-MCNC: 151 MG/DL (ref 70–99)
GLUCOSE, URINE: NEGATIVE MG/DL
HCT VFR BLD CALC: 30.9 % (ref 42–52)
HEMOGLOBIN: 8.8 GM/DL (ref 13.5–18)
IMMATURE NEUTROPHIL %: 0.3 % (ref 0–0.43)
KETONES, URINE: NEGATIVE MG/DL
LEUKOCYTE ESTERASE, URINE: ABNORMAL
LIPASE: 28 IU/L (ref 13–60)
LYMPHOCYTES ABSOLUTE: 1.6 K/CU MM
LYMPHOCYTES RELATIVE PERCENT: 22.9 % (ref 24–44)
MAGNESIUM: 2.8 MG/DL (ref 1.8–2.4)
MCH RBC QN AUTO: 28.6 PG (ref 27–31)
MCHC RBC AUTO-ENTMCNC: 28.5 % (ref 32–36)
MCV RBC AUTO: 100.3 FL (ref 78–100)
MONOCYTES ABSOLUTE: 0.5 K/CU MM
MONOCYTES RELATIVE PERCENT: 6.7 % (ref 0–4)
NITRITE URINE, QUANTITATIVE: NEGATIVE
NUCLEATED RBC %: 0 %
PDW BLD-RTO: 17.2 % (ref 11.7–14.9)
PH, URINE: 5.5 (ref 5–8)
PLATELET # BLD: 242 K/CU MM (ref 140–440)
PMV BLD AUTO: 10.8 FL (ref 7.5–11.1)
POTASSIUM SERPL-SCNC: 4.8 MMOL/L (ref 3.5–5.1)
PRO-BNP: 1478 PG/ML
PROTEIN UA: 100 MG/DL
RBC # BLD: 3.08 M/CU MM (ref 4.6–6.2)
RBC URINE: 146 /HPF (ref 0–3)
SEGMENTED NEUTROPHILS ABSOLUTE COUNT: 4.4 K/CU MM
SEGMENTED NEUTROPHILS RELATIVE PERCENT: 62.3 % (ref 36–66)
SODIUM BLD-SCNC: 142 MMOL/L (ref 135–145)
SPECIFIC GRAVITY UA: 1.02 (ref 1–1.03)
TOTAL IMMATURE NEUTOROPHIL: 0.02 K/CU MM
TOTAL NUCLEATED RBC: 0 K/CU MM
TOTAL PROTEIN: 7.4 GM/DL (ref 6.4–8.2)
TRICHOMONAS: ABNORMAL /HPF
TROPONIN T: 0.04 NG/ML
UROBILINOGEN, URINE: 0.2 MG/DL (ref 0.2–1)
WBC # BLD: 7 K/CU MM (ref 4–10.5)
WBC CLUMP: ABNORMAL /HPF
WBC UA: 1260 /HPF (ref 0–2)

## 2023-05-21 PROCEDURE — 71045 X-RAY EXAM CHEST 1 VIEW: CPT

## 2023-05-21 PROCEDURE — 83735 ASSAY OF MAGNESIUM: CPT

## 2023-05-21 PROCEDURE — 84484 ASSAY OF TROPONIN QUANT: CPT

## 2023-05-21 PROCEDURE — 87086 URINE CULTURE/COLONY COUNT: CPT

## 2023-05-21 PROCEDURE — 81001 URINALYSIS AUTO W/SCOPE: CPT

## 2023-05-21 PROCEDURE — 1200000000 HC SEMI PRIVATE

## 2023-05-21 PROCEDURE — 87106 FUNGI IDENTIFICATION YEAST: CPT

## 2023-05-21 PROCEDURE — 80053 COMPREHEN METABOLIC PANEL: CPT

## 2023-05-21 PROCEDURE — 83880 ASSAY OF NATRIURETIC PEPTIDE: CPT

## 2023-05-21 PROCEDURE — 76937 US GUIDE VASCULAR ACCESS: CPT

## 2023-05-21 PROCEDURE — 85025 COMPLETE CBC W/AUTO DIFF WBC: CPT

## 2023-05-21 PROCEDURE — 83690 ASSAY OF LIPASE: CPT

## 2023-05-21 PROCEDURE — 93005 ELECTROCARDIOGRAM TRACING: CPT | Performed by: EMERGENCY MEDICINE

## 2023-05-21 PROCEDURE — 99285 EMERGENCY DEPT VISIT HI MDM: CPT

## 2023-05-21 RX ORDER — GLUCAGON 1 MG/ML
1 KIT INJECTION PRN
Status: DISCONTINUED | OUTPATIENT
Start: 2023-05-21 | End: 2023-05-26 | Stop reason: HOSPADM

## 2023-05-21 RX ORDER — INSULIN LISPRO 100 [IU]/ML
10 INJECTION, SOLUTION INTRAVENOUS; SUBCUTANEOUS
Status: DISCONTINUED | OUTPATIENT
Start: 2023-05-22 | End: 2023-05-25

## 2023-05-21 RX ORDER — 0.9 % SODIUM CHLORIDE 0.9 %
500 INTRAVENOUS SOLUTION INTRAVENOUS ONCE
Status: COMPLETED | OUTPATIENT
Start: 2023-05-21 | End: 2023-05-22

## 2023-05-21 RX ORDER — DEXTROSE MONOHYDRATE 100 MG/ML
INJECTION, SOLUTION INTRAVENOUS CONTINUOUS PRN
Status: DISCONTINUED | OUTPATIENT
Start: 2023-05-21 | End: 2023-05-26 | Stop reason: HOSPADM

## 2023-05-21 RX ORDER — INSULIN LISPRO 100 [IU]/ML
0-4 INJECTION, SOLUTION INTRAVENOUS; SUBCUTANEOUS
Status: DISCONTINUED | OUTPATIENT
Start: 2023-05-22 | End: 2023-05-26 | Stop reason: HOSPADM

## 2023-05-21 RX ORDER — INSULIN LISPRO 100 [IU]/ML
0-4 INJECTION, SOLUTION INTRAVENOUS; SUBCUTANEOUS NIGHTLY
Status: DISCONTINUED | OUTPATIENT
Start: 2023-05-22 | End: 2023-05-25

## 2023-05-21 RX ORDER — INSULIN GLARGINE 100 [IU]/ML
15 INJECTION, SOLUTION SUBCUTANEOUS EVERY EVENING
Status: DISCONTINUED | OUTPATIENT
Start: 2023-05-22 | End: 2023-05-25

## 2023-05-22 LAB
ALBUMIN SERPL-MCNC: 3.4 GM/DL (ref 3.4–5)
ALP BLD-CCNC: 112 IU/L (ref 40–129)
ALT SERPL-CCNC: 25 U/L (ref 10–40)
ANION GAP SERPL CALCULATED.3IONS-SCNC: 11 MMOL/L (ref 4–16)
AST SERPL-CCNC: 45 IU/L (ref 15–37)
BASOPHILS ABSOLUTE: 0 K/CU MM
BASOPHILS RELATIVE PERCENT: 0.5 % (ref 0–1)
BILIRUB SERPL-MCNC: 0.3 MG/DL (ref 0–1)
BUN SERPL-MCNC: 37 MG/DL (ref 6–23)
CALCIUM SERPL-MCNC: 8.1 MG/DL (ref 8.3–10.6)
CHLORIDE BLD-SCNC: 109 MMOL/L (ref 99–110)
CO2: 24 MMOL/L (ref 21–32)
CREAT SERPL-MCNC: 2.1 MG/DL (ref 0.9–1.3)
DIFFERENTIAL TYPE: ABNORMAL
EOSINOPHILS ABSOLUTE: 0.5 K/CU MM
EOSINOPHILS RELATIVE PERCENT: 5.4 % (ref 0–3)
ESTIMATED AVERAGE GLUCOSE: 206 MG/DL
GFR SERPL CREATININE-BSD FRML MDRD: 30 ML/MIN/1.73M2
GLUCOSE BLD-MCNC: 137 MG/DL (ref 70–99)
GLUCOSE BLD-MCNC: 145 MG/DL (ref 70–99)
GLUCOSE BLD-MCNC: 149 MG/DL (ref 70–99)
GLUCOSE BLD-MCNC: 177 MG/DL (ref 70–99)
GLUCOSE SERPL-MCNC: 154 MG/DL (ref 70–99)
HBA1C MFR BLD: 8.8 % (ref 4.2–6.3)
HCT VFR BLD CALC: 31.3 % (ref 42–52)
HEMOGLOBIN: 9.1 GM/DL (ref 13.5–18)
IMMATURE NEUTROPHIL %: 0.6 % (ref 0–0.43)
INR BLD: 1.02 INDEX
LYMPHOCYTES ABSOLUTE: 1.5 K/CU MM
LYMPHOCYTES RELATIVE PERCENT: 18.1 % (ref 24–44)
MCH RBC QN AUTO: 28.8 PG (ref 27–31)
MCHC RBC AUTO-ENTMCNC: 29.1 % (ref 32–36)
MCV RBC AUTO: 99.1 FL (ref 78–100)
MONOCYTES ABSOLUTE: 0.5 K/CU MM
MONOCYTES RELATIVE PERCENT: 5.7 % (ref 0–4)
NUCLEATED RBC %: 0 %
PDW BLD-RTO: 16.9 % (ref 11.7–14.9)
PLATELET # BLD: 270 K/CU MM (ref 140–440)
PMV BLD AUTO: 11 FL (ref 7.5–11.1)
POTASSIUM SERPL-SCNC: 5 MMOL/L (ref 3.5–5.1)
PROTHROMBIN TIME: 12.9 SECONDS (ref 11.7–14.5)
RBC # BLD: 3.16 M/CU MM (ref 4.6–6.2)
SEGMENTED NEUTROPHILS ABSOLUTE COUNT: 5.8 K/CU MM
SEGMENTED NEUTROPHILS RELATIVE PERCENT: 69.7 % (ref 36–66)
SODIUM BLD-SCNC: 144 MMOL/L (ref 135–145)
TOTAL IMMATURE NEUTOROPHIL: 0.05 K/CU MM
TOTAL NUCLEATED RBC: 0 K/CU MM
TOTAL PROTEIN: 6.6 GM/DL (ref 6.4–8.2)
TSH SERPL DL<=0.005 MIU/L-ACNC: 4.71 UIU/ML (ref 0.27–4.2)
WBC # BLD: 8.3 K/CU MM (ref 4–10.5)

## 2023-05-22 PROCEDURE — 6360000002 HC RX W HCPCS: Performed by: STUDENT IN AN ORGANIZED HEALTH CARE EDUCATION/TRAINING PROGRAM

## 2023-05-22 PROCEDURE — 2580000003 HC RX 258: Performed by: STUDENT IN AN ORGANIZED HEALTH CARE EDUCATION/TRAINING PROGRAM

## 2023-05-22 PROCEDURE — 94761 N-INVAS EAR/PLS OXIMETRY MLT: CPT

## 2023-05-22 PROCEDURE — 80053 COMPREHEN METABOLIC PANEL: CPT

## 2023-05-22 PROCEDURE — 1200000000 HC SEMI PRIVATE

## 2023-05-22 PROCEDURE — 84443 ASSAY THYROID STIM HORMONE: CPT

## 2023-05-22 PROCEDURE — 6370000000 HC RX 637 (ALT 250 FOR IP): Performed by: STUDENT IN AN ORGANIZED HEALTH CARE EDUCATION/TRAINING PROGRAM

## 2023-05-22 PROCEDURE — 85025 COMPLETE CBC W/AUTO DIFF WBC: CPT

## 2023-05-22 PROCEDURE — 2580000003 HC RX 258: Performed by: EMERGENCY MEDICINE

## 2023-05-22 PROCEDURE — 85610 PROTHROMBIN TIME: CPT

## 2023-05-22 PROCEDURE — 82962 GLUCOSE BLOOD TEST: CPT

## 2023-05-22 PROCEDURE — 83036 HEMOGLOBIN GLYCOSYLATED A1C: CPT

## 2023-05-22 PROCEDURE — 36415 COLL VENOUS BLD VENIPUNCTURE: CPT

## 2023-05-22 RX ORDER — CLONIDINE HYDROCHLORIDE 0.1 MG/1
0.1 TABLET ORAL EVERY 8 HOURS PRN
Status: DISCONTINUED | OUTPATIENT
Start: 2023-05-22 | End: 2023-05-26 | Stop reason: HOSPADM

## 2023-05-22 RX ORDER — POLYETHYLENE GLYCOL 3350 17 G/17G
17 POWDER, FOR SOLUTION ORAL DAILY
Status: DISCONTINUED | OUTPATIENT
Start: 2023-05-22 | End: 2023-05-26 | Stop reason: HOSPADM

## 2023-05-22 RX ORDER — PREGABALIN 50 MG/1
50 CAPSULE ORAL 2 TIMES DAILY
Status: DISCONTINUED | OUTPATIENT
Start: 2023-05-22 | End: 2023-05-26 | Stop reason: HOSPADM

## 2023-05-22 RX ORDER — ONDANSETRON 4 MG/1
4 TABLET, ORALLY DISINTEGRATING ORAL EVERY 8 HOURS PRN
Status: DISCONTINUED | OUTPATIENT
Start: 2023-05-22 | End: 2023-05-26 | Stop reason: HOSPADM

## 2023-05-22 RX ORDER — POTASSIUM CHLORIDE 7.45 MG/ML
10 INJECTION INTRAVENOUS PRN
Status: DISCONTINUED | OUTPATIENT
Start: 2023-05-22 | End: 2023-05-26 | Stop reason: HOSPADM

## 2023-05-22 RX ORDER — LEVOTHYROXINE SODIUM 0.07 MG/1
75 TABLET ORAL DAILY
Status: DISCONTINUED | OUTPATIENT
Start: 2023-05-22 | End: 2023-05-26 | Stop reason: HOSPADM

## 2023-05-22 RX ORDER — ACETAMINOPHEN 325 MG/1
650 TABLET ORAL EVERY 6 HOURS PRN
Status: DISCONTINUED | OUTPATIENT
Start: 2023-05-22 | End: 2023-05-26 | Stop reason: HOSPADM

## 2023-05-22 RX ORDER — TORSEMIDE 20 MG/1
20 TABLET ORAL
Status: DISCONTINUED | OUTPATIENT
Start: 2023-05-22 | End: 2023-05-26 | Stop reason: HOSPADM

## 2023-05-22 RX ORDER — ACETAMINOPHEN 650 MG/1
650 SUPPOSITORY RECTAL EVERY 6 HOURS PRN
Status: DISCONTINUED | OUTPATIENT
Start: 2023-05-22 | End: 2023-05-26 | Stop reason: HOSPADM

## 2023-05-22 RX ORDER — SODIUM CHLORIDE 0.9 % (FLUSH) 0.9 %
5-40 SYRINGE (ML) INJECTION EVERY 12 HOURS SCHEDULED
Status: DISCONTINUED | OUTPATIENT
Start: 2023-05-22 | End: 2023-05-26 | Stop reason: HOSPADM

## 2023-05-22 RX ORDER — AMLODIPINE BESYLATE 5 MG/1
5 TABLET ORAL DAILY
Status: DISCONTINUED | OUTPATIENT
Start: 2023-05-22 | End: 2023-05-25

## 2023-05-22 RX ORDER — ONDANSETRON 2 MG/ML
4 INJECTION INTRAMUSCULAR; INTRAVENOUS EVERY 6 HOURS PRN
Status: DISCONTINUED | OUTPATIENT
Start: 2023-05-22 | End: 2023-05-26 | Stop reason: HOSPADM

## 2023-05-22 RX ORDER — FERROUS SULFATE 325(65) MG
325 TABLET ORAL EVERY OTHER DAY
Status: DISCONTINUED | OUTPATIENT
Start: 2023-05-22 | End: 2023-05-26 | Stop reason: HOSPADM

## 2023-05-22 RX ORDER — ALLOPURINOL 100 MG/1
50 TABLET ORAL EVERY OTHER DAY
Status: DISCONTINUED | OUTPATIENT
Start: 2023-05-23 | End: 2023-05-26 | Stop reason: HOSPADM

## 2023-05-22 RX ORDER — MAGNESIUM SULFATE IN WATER 40 MG/ML
2000 INJECTION, SOLUTION INTRAVENOUS PRN
Status: DISCONTINUED | OUTPATIENT
Start: 2023-05-22 | End: 2023-05-26 | Stop reason: HOSPADM

## 2023-05-22 RX ORDER — SODIUM CHLORIDE 9 MG/ML
INJECTION, SOLUTION INTRAVENOUS PRN
Status: DISCONTINUED | OUTPATIENT
Start: 2023-05-22 | End: 2023-05-26 | Stop reason: HOSPADM

## 2023-05-22 RX ORDER — ASPIRIN 81 MG/1
81 TABLET, CHEWABLE ORAL DAILY
Status: DISCONTINUED | OUTPATIENT
Start: 2023-05-22 | End: 2023-05-26 | Stop reason: HOSPADM

## 2023-05-22 RX ORDER — SENNA AND DOCUSATE SODIUM 50; 8.6 MG/1; MG/1
2 TABLET, FILM COATED ORAL DAILY
Status: DISCONTINUED | OUTPATIENT
Start: 2023-05-22 | End: 2023-05-26 | Stop reason: HOSPADM

## 2023-05-22 RX ORDER — PANTOPRAZOLE SODIUM 40 MG/1
40 TABLET, DELAYED RELEASE ORAL
Status: DISCONTINUED | OUTPATIENT
Start: 2023-05-22 | End: 2023-05-26 | Stop reason: HOSPADM

## 2023-05-22 RX ORDER — CARVEDILOL 25 MG/1
25 TABLET ORAL 2 TIMES DAILY
Status: DISCONTINUED | OUTPATIENT
Start: 2023-05-22 | End: 2023-05-26 | Stop reason: HOSPADM

## 2023-05-22 RX ORDER — LANOLIN ALCOHOL/MO/W.PET/CERES
1.5 CREAM (GRAM) TOPICAL NIGHTLY
Status: DISCONTINUED | OUTPATIENT
Start: 2023-05-22 | End: 2023-05-26 | Stop reason: HOSPADM

## 2023-05-22 RX ORDER — BISACODYL 10 MG
10 SUPPOSITORY, RECTAL RECTAL DAILY PRN
Status: DISCONTINUED | OUTPATIENT
Start: 2023-05-22 | End: 2023-05-26 | Stop reason: HOSPADM

## 2023-05-22 RX ORDER — SODIUM CHLORIDE 0.9 % (FLUSH) 0.9 %
5-40 SYRINGE (ML) INJECTION PRN
Status: DISCONTINUED | OUTPATIENT
Start: 2023-05-22 | End: 2023-05-26 | Stop reason: HOSPADM

## 2023-05-22 RX ORDER — HEPARIN SODIUM 5000 [USP'U]/ML
5000 INJECTION, SOLUTION INTRAVENOUS; SUBCUTANEOUS EVERY 8 HOURS SCHEDULED
Status: DISCONTINUED | OUTPATIENT
Start: 2023-05-22 | End: 2023-05-26 | Stop reason: HOSPADM

## 2023-05-22 RX ADMIN — SODIUM CHLORIDE 500 ML: 9 INJECTION, SOLUTION INTRAVENOUS at 00:06

## 2023-05-22 RX ADMIN — HEPARIN SODIUM 5000 UNITS: 5000 INJECTION INTRAVENOUS; SUBCUTANEOUS at 07:57

## 2023-05-22 RX ADMIN — CARVEDILOL 25 MG: 25 TABLET, FILM COATED ORAL at 09:36

## 2023-05-22 RX ADMIN — SODIUM ZIRCONIUM CYCLOSILICATE 5 G: 5 POWDER, FOR SUSPENSION ORAL at 23:10

## 2023-05-22 RX ADMIN — POLYETHYLENE GLYCOL 3350 17 G: 17 POWDER, FOR SOLUTION ORAL at 09:37

## 2023-05-22 RX ADMIN — Medication 1.5 MG: at 23:07

## 2023-05-22 RX ADMIN — INSULIN GLARGINE 15 UNITS: 100 INJECTION, SOLUTION SUBCUTANEOUS at 23:09

## 2023-05-22 RX ADMIN — FERROUS SULFATE TAB 325 MG (65 MG ELEMENTAL FE) 325 MG: 325 (65 FE) TAB at 09:36

## 2023-05-22 RX ADMIN — SODIUM CHLORIDE, PRESERVATIVE FREE 10 ML: 5 INJECTION INTRAVENOUS at 09:37

## 2023-05-22 RX ADMIN — HEPARIN SODIUM 5000 UNITS: 5000 INJECTION INTRAVENOUS; SUBCUTANEOUS at 17:11

## 2023-05-22 RX ADMIN — TORSEMIDE 20 MG: 20 TABLET ORAL at 09:36

## 2023-05-22 RX ADMIN — MEROPENEM 1000 MG: 1 INJECTION, POWDER, FOR SOLUTION INTRAVENOUS at 09:45

## 2023-05-22 RX ADMIN — AMLODIPINE BESYLATE 5 MG: 5 TABLET ORAL at 09:36

## 2023-05-22 RX ADMIN — LEVOTHYROXINE SODIUM 75 MCG: 0.07 TABLET ORAL at 07:57

## 2023-05-22 RX ADMIN — INSULIN GLARGINE 15 UNITS: 100 INJECTION, SOLUTION SUBCUTANEOUS at 02:38

## 2023-05-22 RX ADMIN — CARVEDILOL 25 MG: 25 TABLET, FILM COATED ORAL at 23:08

## 2023-05-22 RX ADMIN — PANTOPRAZOLE SODIUM 40 MG: 40 TABLET, DELAYED RELEASE ORAL at 07:57

## 2023-05-22 RX ADMIN — MEROPENEM 1000 MG: 1 INJECTION, POWDER, FOR SOLUTION INTRAVENOUS at 23:18

## 2023-05-22 RX ADMIN — ASPIRIN 81 MG CHEWABLE TABLET 81 MG: 81 TABLET CHEWABLE at 09:36

## 2023-05-22 RX ADMIN — MEROPENEM 1000 MG: 1 INJECTION, POWDER, FOR SOLUTION INTRAVENOUS at 00:07

## 2023-05-22 RX ADMIN — PREGABALIN 50 MG: 50 CAPSULE ORAL at 09:36

## 2023-05-22 RX ADMIN — SENNOSIDES AND DOCUSATE SODIUM 2 TABLET: 50; 8.6 TABLET ORAL at 09:36

## 2023-05-22 RX ADMIN — SODIUM CHLORIDE, PRESERVATIVE FREE 10 ML: 5 INJECTION INTRAVENOUS at 23:10

## 2023-05-22 RX ADMIN — HEPARIN SODIUM 5000 UNITS: 5000 INJECTION INTRAVENOUS; SUBCUTANEOUS at 23:09

## 2023-05-22 RX ADMIN — PREGABALIN 50 MG: 50 CAPSULE ORAL at 23:08

## 2023-05-22 ASSESSMENT — LIFESTYLE VARIABLES
HOW OFTEN DO YOU HAVE A DRINK CONTAINING ALCOHOL: NEVER
HOW MANY STANDARD DRINKS CONTAINING ALCOHOL DO YOU HAVE ON A TYPICAL DAY: PATIENT DOES NOT DRINK

## 2023-05-22 ASSESSMENT — PAIN SCALES - GENERAL: PAINLEVEL_OUTOF10: 0

## 2023-05-22 NOTE — PLAN OF CARE
Problem: Discharge Planning  Goal: Discharge to home or other facility with appropriate resources  Outcome: Progressing  Flowsheets (Taken 5/22/2023 0811 by Wesley Barreto, RN)  Discharge to home or other facility with appropriate resources:   Identify barriers to discharge with patient and caregiver   Refer to discharge planning if patient needs post-hospital services based on physician order or complex needs related to functional status, cognitive ability or social support system     Problem: Pain  Goal: Verbalizes/displays adequate comfort level or baseline comfort level  Outcome: Progressing     Problem: Safety - Adult  Goal: Free from fall injury  Outcome: Progressing     Problem: Risk for Elopement  Goal: Patient will not exit the unit/facility without proper excort  Outcome: Progressing  Flowsheets (Taken 5/22/2023 0809 by Wesley Barreto, ISHMAEL)  Nursing Interventions for Elopement Risk:   Make sure patient has all necessary personal care items   Assist with personal care needs such as toileting, eating, dressing, as needed to reduce the risk of wandering     Problem: Skin/Tissue Integrity  Goal: Absence of new skin breakdown  Description: 1. Monitor for areas of redness and/or skin breakdown  2. Assess vascular access sites hourly  3. Every 4-6 hours minimum:  Change oxygen saturation probe site  4. Every 4-6 hours:  If on nasal continuous positive airway pressure, respiratory therapy assess nares and determine need for appliance change or resting period.   Outcome: Progressing

## 2023-05-22 NOTE — CONSULTS
Consult completed. Nexiva 20g 1.75 inch peripheral IV initiated into right forearm x 1 attempt with ultrasound guidance. Brisk blood return, flushes without resistance. Patient tolerated well. Please consult IV/PICC team if patient's needs change, questions, or concerns.

## 2023-05-22 NOTE — ED NOTES
ED TO INPATIENT SBAR HANDOFF    Patient Name: Pacheco Dash   :  1940  78 y.o. Preferred Name  Cherise Duvall  Family/Caregiver Present yes   Restraints no   C-SSRS: Risk of Suicide: No Risk  Sitter no   Sepsis Risk Score Sepsis Risk Score: 1.83      Situation  Chief Complaint   Patient presents with    Altered Mental Status     Brief Description of Patient's Condition: Patient presents to ED from nursing home with increased lethargy, AMS since Thursday. Treated for UTI but worsening symptoms   Mental Status: alert to self and time  Arrived from: nursing home    Imaging:   XR CHEST PORTABLE   Final Result   Increased interstitial opacity. While much or all of this may be chronic,   please correlate with any clinical evidence of acute interstitial edema.            Abnormal labs:   Abnormal Labs Reviewed   COMPREHENSIVE METABOLIC PANEL - Abnormal; Notable for the following components:       Result Value    BUN 36 (*)     Creatinine 2.2 (*)     Est, Glom Filt Rate 29 (*)     Glucose 151 (*)     Albumin 3.0 (*)     AST 51 (*)     All other components within normal limits   TROPONIN - Abnormal; Notable for the following components:    Troponin T 0.038 (*)     All other components within normal limits   MAGNESIUM - Abnormal; Notable for the following components:    Magnesium 2.8 (*)     All other components within normal limits   BRAIN NATRIURETIC PEPTIDE - Abnormal; Notable for the following components:    Pro-BNP 1,478 (*)     All other components within normal limits   CBC WITH AUTO DIFFERENTIAL - Abnormal; Notable for the following components:    RBC 3.08 (*)     Hemoglobin 8.8 (*)     Hematocrit 30.9 (*)     .3 (*)     MCHC 28.5 (*)     RDW 17.2 (*)     Lymphocytes % 22.9 (*)     Monocytes % 6.7 (*)     Eosinophils % 7.4 (*)     All other components within normal limits   URINALYSIS WITH REFLEX TO CULTURE - Abnormal; Notable for the following components:    Clarity, UA SLIGHTLY CLOUDY (*)     Blood, Urine

## 2023-05-22 NOTE — H&P
History and Physical      Name:  Juan Diego Alston /Age/Sex: 1940  (78 y.o. male)   MRN & CSN:  [id number] & [id number] Encounter Date/Time: 2023 11:31 PM EDT   Location:  ED16/ED-16 PCP: Bernabe Siu MD       Hospital Day: 1    Assessment and Plan:     Patient is a 78-year-old male who presented with \"not being himself\" at [facility]. # Acute cystitis with hematuria  - Allegedly \"not being himself\" and confused at [facility], thus sent for evaluation. Son denied any significant changes. Hx of ESBL E coli. Has SPC, last exchanged about a week ago. - Afebrile, no leukocytosis. UA positive for infection. CXR non-acute. - Started on Unasyn in the ED, switch to Merrem due to hx of ESBL E coli for now. A&Ox3, attentive, no evidence of delirium. Consider need for antibiotics in 1-2 days (likely colonization). Follow-up cultures. # CKD4  - Renal function stable. - Renally-dose meds, avoid nephrotoxic agents. # Non-MI troponin elevation  - Denied any typical CP.  - Initial Tn mildly elevated, unclear why it was checked. ECG without acute ischemic changes. - Likely secondary to poor clearance from renal disease. Follow-up repeat Tn. # Anemia of chronic disease  - H/H at baseline.   - Anemia panel in 3/2023 consistent with AoCD. # T2DM  - Last A1c 6.6% in 2022, repeat pending.   - Decreased home Insulin Lantus to 15 u qhs and Lispro 10 u TIDWM with LCSI. # Essential hypretension  - Continue home Coreg, Clonidine and Norvasc. # Acquired hypothyroidism  - Continue Synthroid. - Follow-up repeat TSH. # Prostate cancer s/p prostatectomy in   - Followed by Urology, on ADT every 3 months. Checklist:  Advanced directive: DNR-CCA  Diet: cardiac  DVT ppx: heparin  Sugar: BG goal of 140-180 while inpatient    Disposition: admit to inpatient. Estimated discharge: 2-3 day(s). Current living situation: ECF. Expected disposition: ECF.     Spoke with ED provider who recommended admission for

## 2023-05-22 NOTE — CARE COORDINATION
05/22/23 1300   Service Assessment   Patient Orientation Alert and Oriented   Cognition Alert   History Provided By Patient   Primary [address]   PCP Verified by CM Yes   Can patient return to prior living arrangement Yes   Ability to make needs known: Cecilia Sanchez   Family able to assist with home care needs: No   Would you like for me to discuss the discharge plan with any other family members/significant others, and if so, who? No   Financial Resources None   [employer] None   Social/Functional History   Type of Home Facility   Discharge Planning   Potential Assistance Needed Extended Care Placement   DME Ordered? No   Potential Assistance Purchasing Medications No   Type of Home Care Services None   Patient expects to be discharged to: Long-term care   History of falls? 0   Services At/After Discharge   Transition of Care Consult (CM Consult) [address] At/After Discharge Long term care     Met with patient, from [place], [address] [place] [address]. Spoke with Jenn, can return  when stable. No precert needed, unless going Skilled will require three day stay.

## 2023-05-23 ENCOUNTER — APPOINTMENT (OUTPATIENT)
Dept: CT IMAGING | Age: 83
DRG: 071 | End: 2023-05-23
Payer: MEDICARE

## 2023-05-23 LAB
ANION GAP SERPL CALCULATED.3IONS-SCNC: 13 MMOL/L (ref 4–16)
BASOPHILS ABSOLUTE: 0 K/CU MM
BASOPHILS RELATIVE PERCENT: 0.4 % (ref 0–1)
BUN SERPL-MCNC: 35 MG/DL (ref 6–23)
CALCIUM SERPL-MCNC: 8.7 MG/DL (ref 8.3–10.6)
CHLORIDE BLD-SCNC: 110 MMOL/L (ref 99–110)
CO2: 22 MMOL/L (ref 21–32)
CREAT SERPL-MCNC: 2.1 MG/DL (ref 0.9–1.3)
DIFFERENTIAL TYPE: ABNORMAL
EKG ATRIAL RATE: 58 BPM
EKG DIAGNOSIS: NORMAL
EKG Q-T INTERVAL: 446 MS
EKG QRS DURATION: 94 MS
EKG QTC CALCULATION (BAZETT): 437 MS
EKG R AXIS: -6 DEGREES
EKG T AXIS: 30 DEGREES
EKG VENTRICULAR RATE: 58 BPM
EOSINOPHILS ABSOLUTE: 0.4 K/CU MM
EOSINOPHILS RELATIVE PERCENT: 6.1 % (ref 0–3)
GFR SERPL CREATININE-BSD FRML MDRD: 30 ML/MIN/1.73M2
GLUCOSE BLD-MCNC: 66 MG/DL (ref 70–99)
GLUCOSE BLD-MCNC: 74 MG/DL (ref 70–99)
GLUCOSE BLD-MCNC: 81 MG/DL (ref 70–99)
GLUCOSE BLD-MCNC: 97 MG/DL (ref 70–99)
GLUCOSE SERPL-MCNC: 69 MG/DL (ref 70–99)
HCT VFR BLD CALC: 29.4 % (ref 42–52)
HEMOGLOBIN: 8.7 GM/DL (ref 13.5–18)
IMMATURE NEUTROPHIL %: 0.4 % (ref 0–0.43)
LV EF: 58 %
LVEF MODALITY: NORMAL
LYMPHOCYTES ABSOLUTE: 1.5 K/CU MM
LYMPHOCYTES RELATIVE PERCENT: 21.1 % (ref 24–44)
MCH RBC QN AUTO: 29.3 PG (ref 27–31)
MCHC RBC AUTO-ENTMCNC: 29.6 % (ref 32–36)
MCV RBC AUTO: 99 FL (ref 78–100)
MONOCYTES ABSOLUTE: 0.4 K/CU MM
MONOCYTES RELATIVE PERCENT: 5.8 % (ref 0–4)
NUCLEATED RBC %: 0 %
PDW BLD-RTO: 17 % (ref 11.7–14.9)
PLATELET # BLD: 283 K/CU MM (ref 140–440)
PMV BLD AUTO: 11 FL (ref 7.5–11.1)
POTASSIUM SERPL-SCNC: 5 MMOL/L (ref 3.5–5.1)
RBC # BLD: 2.97 M/CU MM (ref 4.6–6.2)
SEGMENTED NEUTROPHILS ABSOLUTE COUNT: 4.7 K/CU MM
SEGMENTED NEUTROPHILS RELATIVE PERCENT: 66.2 % (ref 36–66)
SODIUM BLD-SCNC: 145 MMOL/L (ref 135–145)
TOTAL IMMATURE NEUTOROPHIL: 0.03 K/CU MM
TOTAL NUCLEATED RBC: 0 K/CU MM
WBC # BLD: 7.1 K/CU MM (ref 4–10.5)

## 2023-05-23 PROCEDURE — 2580000003 HC RX 258: Performed by: INTERNAL MEDICINE

## 2023-05-23 PROCEDURE — 6360000002 HC RX W HCPCS: Performed by: STUDENT IN AN ORGANIZED HEALTH CARE EDUCATION/TRAINING PROGRAM

## 2023-05-23 PROCEDURE — 87040 BLOOD CULTURE FOR BACTERIA: CPT

## 2023-05-23 PROCEDURE — 82962 GLUCOSE BLOOD TEST: CPT

## 2023-05-23 PROCEDURE — 80048 BASIC METABOLIC PNL TOTAL CA: CPT

## 2023-05-23 PROCEDURE — 2580000003 HC RX 258: Performed by: STUDENT IN AN ORGANIZED HEALTH CARE EDUCATION/TRAINING PROGRAM

## 2023-05-23 PROCEDURE — 1200000000 HC SEMI PRIVATE

## 2023-05-23 PROCEDURE — 6370000000 HC RX 637 (ALT 250 FOR IP): Performed by: STUDENT IN AN ORGANIZED HEALTH CARE EDUCATION/TRAINING PROGRAM

## 2023-05-23 PROCEDURE — 94761 N-INVAS EAR/PLS OXIMETRY MLT: CPT

## 2023-05-23 PROCEDURE — 93010 ELECTROCARDIOGRAM REPORT: CPT | Performed by: INTERNAL MEDICINE

## 2023-05-23 PROCEDURE — 85025 COMPLETE CBC W/AUTO DIFF WBC: CPT

## 2023-05-23 PROCEDURE — 70450 CT HEAD/BRAIN W/O DYE: CPT

## 2023-05-23 PROCEDURE — 36415 COLL VENOUS BLD VENIPUNCTURE: CPT

## 2023-05-23 PROCEDURE — 93306 TTE W/DOPPLER COMPLETE: CPT

## 2023-05-23 RX ORDER — SODIUM CHLORIDE 450 MG/100ML
INJECTION, SOLUTION INTRAVENOUS CONTINUOUS
Status: DISCONTINUED | OUTPATIENT
Start: 2023-05-23 | End: 2023-05-24

## 2023-05-23 RX ADMIN — HEPARIN SODIUM 5000 UNITS: 5000 INJECTION INTRAVENOUS; SUBCUTANEOUS at 06:13

## 2023-05-23 RX ADMIN — HEPARIN SODIUM 5000 UNITS: 5000 INJECTION INTRAVENOUS; SUBCUTANEOUS at 14:39

## 2023-05-23 RX ADMIN — AMLODIPINE BESYLATE 5 MG: 5 TABLET ORAL at 10:31

## 2023-05-23 RX ADMIN — LEVOTHYROXINE SODIUM 75 MCG: 0.07 TABLET ORAL at 06:14

## 2023-05-23 RX ADMIN — SODIUM CHLORIDE, PRESERVATIVE FREE 10 ML: 5 INJECTION INTRAVENOUS at 10:24

## 2023-05-23 RX ADMIN — SODIUM CHLORIDE: 4.5 INJECTION, SOLUTION INTRAVENOUS at 14:47

## 2023-05-23 RX ADMIN — MEROPENEM 1000 MG: 1 INJECTION, POWDER, FOR SOLUTION INTRAVENOUS at 10:28

## 2023-05-23 RX ADMIN — ALLOPURINOL 50 MG: 100 TABLET ORAL at 10:31

## 2023-05-23 RX ADMIN — PREGABALIN 50 MG: 50 CAPSULE ORAL at 10:31

## 2023-05-23 RX ADMIN — PANTOPRAZOLE SODIUM 40 MG: 40 TABLET, DELAYED RELEASE ORAL at 06:14

## 2023-05-23 RX ADMIN — SENNOSIDES AND DOCUSATE SODIUM 2 TABLET: 50; 8.6 TABLET ORAL at 10:32

## 2023-05-23 RX ADMIN — CARVEDILOL 25 MG: 25 TABLET, FILM COATED ORAL at 21:12

## 2023-05-23 RX ADMIN — CARVEDILOL 25 MG: 25 TABLET, FILM COATED ORAL at 10:31

## 2023-05-23 RX ADMIN — Medication 1.5 MG: at 21:11

## 2023-05-23 RX ADMIN — POLYETHYLENE GLYCOL 3350 17 G: 17 POWDER, FOR SOLUTION ORAL at 10:30

## 2023-05-23 RX ADMIN — HEPARIN SODIUM 5000 UNITS: 5000 INJECTION INTRAVENOUS; SUBCUTANEOUS at 21:11

## 2023-05-23 RX ADMIN — ASPIRIN 81 MG CHEWABLE TABLET 81 MG: 81 TABLET CHEWABLE at 10:31

## 2023-05-24 LAB
ANION GAP SERPL CALCULATED.3IONS-SCNC: 12 MMOL/L (ref 4–16)
BASOPHILS ABSOLUTE: 0 K/CU MM
BASOPHILS RELATIVE PERCENT: 0.6 % (ref 0–1)
BUN SERPL-MCNC: 35 MG/DL (ref 6–23)
CALCIUM SERPL-MCNC: 8.7 MG/DL (ref 8.3–10.6)
CHLORIDE BLD-SCNC: 110 MMOL/L (ref 99–110)
CHOLEST SERPL-MCNC: 150 MG/DL
CO2: 23 MMOL/L (ref 21–32)
CREAT SERPL-MCNC: 2.2 MG/DL (ref 0.9–1.3)
CULTURE: ABNORMAL
CULTURE: ABNORMAL
DIFFERENTIAL TYPE: ABNORMAL
EOSINOPHILS ABSOLUTE: 0.5 K/CU MM
EOSINOPHILS RELATIVE PERCENT: 8 % (ref 0–3)
GFR SERPL CREATININE-BSD FRML MDRD: 29 ML/MIN/1.73M2
GLUCOSE BLD-MCNC: 105 MG/DL (ref 70–99)
GLUCOSE BLD-MCNC: 111 MG/DL (ref 70–99)
GLUCOSE BLD-MCNC: 150 MG/DL (ref 70–99)
GLUCOSE BLD-MCNC: 156 MG/DL (ref 70–99)
GLUCOSE BLD-MCNC: 161 MG/DL (ref 70–99)
GLUCOSE SERPL-MCNC: 85 MG/DL (ref 70–99)
HCT VFR BLD CALC: 28.2 % (ref 42–52)
HDLC SERPL-MCNC: 35 MG/DL
HEMOGLOBIN: 8.7 GM/DL (ref 13.5–18)
IMMATURE NEUTROPHIL %: 0.6 % (ref 0–0.43)
LDLC SERPL CALC-MCNC: 77 MG/DL
LYMPHOCYTES ABSOLUTE: 1.9 K/CU MM
LYMPHOCYTES RELATIVE PERCENT: 29 % (ref 24–44)
Lab: ABNORMAL
MCH RBC QN AUTO: 29.4 PG (ref 27–31)
MCHC RBC AUTO-ENTMCNC: 30.9 % (ref 32–36)
MCV RBC AUTO: 95.3 FL (ref 78–100)
MONOCYTES ABSOLUTE: 0.4 K/CU MM
MONOCYTES RELATIVE PERCENT: 6.5 % (ref 0–4)
NUCLEATED RBC %: 0 %
PDW BLD-RTO: 17.1 % (ref 11.7–14.9)
PLATELET # BLD: 296 K/CU MM (ref 140–440)
PMV BLD AUTO: 10.6 FL (ref 7.5–11.1)
POTASSIUM SERPL-SCNC: 4.7 MMOL/L (ref 3.5–5.1)
RBC # BLD: 2.96 M/CU MM (ref 4.6–6.2)
SEGMENTED NEUTROPHILS ABSOLUTE COUNT: 3.6 K/CU MM
SEGMENTED NEUTROPHILS RELATIVE PERCENT: 55.3 % (ref 36–66)
SODIUM BLD-SCNC: 145 MMOL/L (ref 135–145)
SPECIMEN: ABNORMAL
T4 FREE SERPL-MCNC: 1.55 NG/DL (ref 0.9–1.8)
TOTAL IMMATURE NEUTOROPHIL: 0.04 K/CU MM
TOTAL NUCLEATED RBC: 0 K/CU MM
TRIGL SERPL-MCNC: 192 MG/DL
WBC # BLD: 6.6 K/CU MM (ref 4–10.5)

## 2023-05-24 PROCEDURE — 80048 BASIC METABOLIC PNL TOTAL CA: CPT

## 2023-05-24 PROCEDURE — 2580000003 HC RX 258: Performed by: INTERNAL MEDICINE

## 2023-05-24 PROCEDURE — 6370000000 HC RX 637 (ALT 250 FOR IP): Performed by: STUDENT IN AN ORGANIZED HEALTH CARE EDUCATION/TRAINING PROGRAM

## 2023-05-24 PROCEDURE — 2580000003 HC RX 258: Performed by: STUDENT IN AN ORGANIZED HEALTH CARE EDUCATION/TRAINING PROGRAM

## 2023-05-24 PROCEDURE — 97166 OT EVAL MOD COMPLEX 45 MIN: CPT

## 2023-05-24 PROCEDURE — 99222 1ST HOSP IP/OBS MODERATE 55: CPT | Performed by: STUDENT IN AN ORGANIZED HEALTH CARE EDUCATION/TRAINING PROGRAM

## 2023-05-24 PROCEDURE — 84439 ASSAY OF FREE THYROXINE: CPT

## 2023-05-24 PROCEDURE — 1200000000 HC SEMI PRIVATE

## 2023-05-24 PROCEDURE — 36415 COLL VENOUS BLD VENIPUNCTURE: CPT

## 2023-05-24 PROCEDURE — 97535 SELF CARE MNGMENT TRAINING: CPT

## 2023-05-24 PROCEDURE — 92610 EVALUATE SWALLOWING FUNCTION: CPT | Performed by: SPEECH-LANGUAGE PATHOLOGIST

## 2023-05-24 PROCEDURE — 85025 COMPLETE CBC W/AUTO DIFF WBC: CPT

## 2023-05-24 PROCEDURE — 6360000002 HC RX W HCPCS: Performed by: STUDENT IN AN ORGANIZED HEALTH CARE EDUCATION/TRAINING PROGRAM

## 2023-05-24 PROCEDURE — 94761 N-INVAS EAR/PLS OXIMETRY MLT: CPT

## 2023-05-24 PROCEDURE — 82962 GLUCOSE BLOOD TEST: CPT

## 2023-05-24 PROCEDURE — 80061 LIPID PANEL: CPT

## 2023-05-24 RX ADMIN — SODIUM CHLORIDE: 4.5 INJECTION, SOLUTION INTRAVENOUS at 06:47

## 2023-05-24 RX ADMIN — FERROUS SULFATE TAB 325 MG (65 MG ELEMENTAL FE) 325 MG: 325 (65 FE) TAB at 11:00

## 2023-05-24 RX ADMIN — POLYETHYLENE GLYCOL 3350 17 G: 17 POWDER, FOR SOLUTION ORAL at 11:00

## 2023-05-24 RX ADMIN — HEPARIN SODIUM 5000 UNITS: 5000 INJECTION INTRAVENOUS; SUBCUTANEOUS at 05:38

## 2023-05-24 RX ADMIN — Medication 1.5 MG: at 19:45

## 2023-05-24 RX ADMIN — ASPIRIN 81 MG CHEWABLE TABLET 81 MG: 81 TABLET CHEWABLE at 11:00

## 2023-05-24 RX ADMIN — AMLODIPINE BESYLATE 5 MG: 5 TABLET ORAL at 11:00

## 2023-05-24 RX ADMIN — HEPARIN SODIUM 5000 UNITS: 5000 INJECTION INTRAVENOUS; SUBCUTANEOUS at 13:25

## 2023-05-24 RX ADMIN — SENNOSIDES AND DOCUSATE SODIUM 2 TABLET: 50; 8.6 TABLET ORAL at 11:00

## 2023-05-24 RX ADMIN — LEVOTHYROXINE SODIUM 75 MCG: 0.07 TABLET ORAL at 05:38

## 2023-05-24 RX ADMIN — SODIUM CHLORIDE, PRESERVATIVE FREE 10 ML: 5 INJECTION INTRAVENOUS at 10:45

## 2023-05-24 RX ADMIN — HEPARIN SODIUM 5000 UNITS: 5000 INJECTION INTRAVENOUS; SUBCUTANEOUS at 20:44

## 2023-05-24 RX ADMIN — SODIUM CHLORIDE, PRESERVATIVE FREE 10 ML: 5 INJECTION INTRAVENOUS at 19:48

## 2023-05-24 RX ADMIN — CARVEDILOL 25 MG: 25 TABLET, FILM COATED ORAL at 19:45

## 2023-05-24 RX ADMIN — CARVEDILOL 25 MG: 25 TABLET, FILM COATED ORAL at 11:00

## 2023-05-24 RX ADMIN — SODIUM ZIRCONIUM CYCLOSILICATE 5 G: 5 POWDER, FOR SUSPENSION ORAL at 19:49

## 2023-05-24 RX ADMIN — PANTOPRAZOLE SODIUM 40 MG: 40 TABLET, DELAYED RELEASE ORAL at 05:38

## 2023-05-24 NOTE — PLAN OF CARE
Problem: Discharge Planning  Goal: Discharge to home or other facility with appropriate resources  Outcome: Progressing     Problem: Pain  Goal: Verbalizes/displays adequate comfort level or baseline comfort level  Outcome: Progressing     Problem: Safety - Adult  Goal: Free from fall injury  Outcome: Progressing     Problem: Risk for Elopement  Goal: Patient will not exit the unit/facility without proper excort  Outcome: Progressing     Problem: Skin/Tissue Integrity  Goal: Absence of new skin breakdown  Description: 1. Monitor for areas of redness and/or skin breakdown  2. Assess vascular access sites hourly  3. Every 4-6 hours minimum:  Change oxygen saturation probe site  4. Every 4-6 hours:  If on nasal continuous positive airway pressure, respiratory therapy assess nares and determine need for appliance change or resting period.   Outcome: Progressing

## 2023-05-24 NOTE — CONSULTS
Neurology Service Consult Note  [address]   Patient Name: Stephen Bowens  : 1940        Subjective:   Reason for consult: Encephalopathy x 2 weeks  78 y.o. male with history of ataxia, DM II, prostate cancer, HLD, CKD, CHF, presenting to [place] [address] with altered mental status. Per chart review patients son reports that 2 weeks ago he was alert and oriented. He has since been confused with poor appetite and aspiration while eating. Patient does have suprapubic catheter and this was last exchanged about a week ago. Initially symptoms were though likely to UTI however culture with no jeffrey infection. Blood cultures have been drawn and are pending. Patient is familiar to our service and was evaluated 11/3/22 for similar complaints. During that hospitalization patient was treated for hypoglycemia, UTI and initially had improvement in mental status but then declined. CTA noted chronic intracranial stenosis and chronic lacunar infarct along the right external capsule. As metabolic abnormalities were corrected patients neurologic status and suspected delirium improved. Chart was reviewed in detail. Patient was seen and assessed. He is awake and alert to self, month and year. He does have underlying confusion about where he is. Patient is talkative. He is able to identify common objects and their uses. He is able to follow commands. He denies  headache, vision change, sensory change or weakness.      Past Medical History:   Diagnosis Date    Acute kidney failure ([address]     Acute urinary tract infection 03/15/2012    Anemia     Ataxia 2010    Ataxia     Bacteremia     Candidiasis     Chronic combined systolic and diastolic congestive heart failure (HCC)     Chronic kidney disease     Cognitive communication deficit     Diabetes mellitus ([address] 2011    type 2, controlled    Extended spectrum beta lactamase (ESBL) resistance     Fusion of spine of cervical region

## 2023-05-25 LAB
ANION GAP SERPL CALCULATED.3IONS-SCNC: 10 MMOL/L (ref 4–16)
BASOPHILS ABSOLUTE: 0 K/CU MM
BASOPHILS RELATIVE PERCENT: 0.6 % (ref 0–1)
BUN SERPL-MCNC: 32 MG/DL (ref 6–23)
CALCIUM SERPL-MCNC: 8.2 MG/DL (ref 8.3–10.6)
CHLORIDE BLD-SCNC: 107 MMOL/L (ref 99–110)
CO2: 23 MMOL/L (ref 21–32)
CREAT SERPL-MCNC: 2 MG/DL (ref 0.9–1.3)
DIFFERENTIAL TYPE: ABNORMAL
EOSINOPHILS ABSOLUTE: 0.4 K/CU MM
EOSINOPHILS RELATIVE PERCENT: 6 % (ref 0–3)
GFR SERPL CREATININE-BSD FRML MDRD: 32 ML/MIN/1.73M2
GLUCOSE BLD-MCNC: 147 MG/DL (ref 70–99)
GLUCOSE BLD-MCNC: 157 MG/DL (ref 70–99)
GLUCOSE BLD-MCNC: 207 MG/DL (ref 70–99)
GLUCOSE BLD-MCNC: 235 MG/DL (ref 70–99)
GLUCOSE SERPL-MCNC: 147 MG/DL (ref 70–99)
HCT VFR BLD CALC: 27.3 % (ref 42–52)
HEMOGLOBIN: 8.4 GM/DL (ref 13.5–18)
IMMATURE NEUTROPHIL %: 0.4 % (ref 0–0.43)
LYMPHOCYTES ABSOLUTE: 1.8 K/CU MM
LYMPHOCYTES RELATIVE PERCENT: 25.1 % (ref 24–44)
MCH RBC QN AUTO: 29.5 PG (ref 27–31)
MCHC RBC AUTO-ENTMCNC: 30.8 % (ref 32–36)
MCV RBC AUTO: 95.8 FL (ref 78–100)
MONOCYTES ABSOLUTE: 0.6 K/CU MM
MONOCYTES RELATIVE PERCENT: 8.1 % (ref 0–4)
NUCLEATED RBC %: 0 %
PDW BLD-RTO: 17 % (ref 11.7–14.9)
PLATELET # BLD: 269 K/CU MM (ref 140–440)
PMV BLD AUTO: 10.6 FL (ref 7.5–11.1)
POTASSIUM SERPL-SCNC: 4.8 MMOL/L (ref 3.5–5.1)
RBC # BLD: 2.85 M/CU MM (ref 4.6–6.2)
SEGMENTED NEUTROPHILS ABSOLUTE COUNT: 4.2 K/CU MM
SEGMENTED NEUTROPHILS RELATIVE PERCENT: 59.8 % (ref 36–66)
SODIUM BLD-SCNC: 140 MMOL/L (ref 135–145)
TOTAL IMMATURE NEUTOROPHIL: 0.03 K/CU MM
TOTAL NUCLEATED RBC: 0 K/CU MM
WBC # BLD: 7 K/CU MM (ref 4–10.5)

## 2023-05-25 PROCEDURE — 6370000000 HC RX 637 (ALT 250 FOR IP): Performed by: STUDENT IN AN ORGANIZED HEALTH CARE EDUCATION/TRAINING PROGRAM

## 2023-05-25 PROCEDURE — 82962 GLUCOSE BLOOD TEST: CPT

## 2023-05-25 PROCEDURE — 2580000003 HC RX 258: Performed by: STUDENT IN AN ORGANIZED HEALTH CARE EDUCATION/TRAINING PROGRAM

## 2023-05-25 PROCEDURE — 94761 N-INVAS EAR/PLS OXIMETRY MLT: CPT

## 2023-05-25 PROCEDURE — 1200000000 HC SEMI PRIVATE

## 2023-05-25 PROCEDURE — 6370000000 HC RX 637 (ALT 250 FOR IP): Performed by: INTERNAL MEDICINE

## 2023-05-25 PROCEDURE — 36415 COLL VENOUS BLD VENIPUNCTURE: CPT

## 2023-05-25 PROCEDURE — 80048 BASIC METABOLIC PNL TOTAL CA: CPT

## 2023-05-25 PROCEDURE — 85025 COMPLETE CBC W/AUTO DIFF WBC: CPT

## 2023-05-25 PROCEDURE — 6360000002 HC RX W HCPCS: Performed by: STUDENT IN AN ORGANIZED HEALTH CARE EDUCATION/TRAINING PROGRAM

## 2023-05-25 RX ORDER — AMLODIPINE BESYLATE 10 MG/1
10 TABLET ORAL DAILY
Status: DISCONTINUED | OUTPATIENT
Start: 2023-05-26 | End: 2023-05-26 | Stop reason: HOSPADM

## 2023-05-25 RX ORDER — AMLODIPINE BESYLATE 5 MG/1
5 TABLET ORAL ONCE
Status: COMPLETED | OUTPATIENT
Start: 2023-05-25 | End: 2023-05-25

## 2023-05-25 RX ORDER — INSULIN GLARGINE 100 [IU]/ML
5 INJECTION, SOLUTION SUBCUTANEOUS EVERY EVENING
Status: DISCONTINUED | OUTPATIENT
Start: 2023-05-25 | End: 2023-05-26 | Stop reason: HOSPADM

## 2023-05-25 RX ADMIN — Medication 1.5 MG: at 20:33

## 2023-05-25 RX ADMIN — CARVEDILOL 25 MG: 25 TABLET, FILM COATED ORAL at 20:34

## 2023-05-25 RX ADMIN — PANTOPRAZOLE SODIUM 40 MG: 40 TABLET, DELAYED RELEASE ORAL at 06:23

## 2023-05-25 RX ADMIN — SODIUM CHLORIDE, PRESERVATIVE FREE 10 ML: 5 INJECTION INTRAVENOUS at 09:31

## 2023-05-25 RX ADMIN — SODIUM CHLORIDE, PRESERVATIVE FREE 10 ML: 5 INJECTION INTRAVENOUS at 20:38

## 2023-05-25 RX ADMIN — HEPARIN SODIUM 5000 UNITS: 5000 INJECTION INTRAVENOUS; SUBCUTANEOUS at 06:23

## 2023-05-25 RX ADMIN — AMLODIPINE BESYLATE 5 MG: 5 TABLET ORAL at 16:02

## 2023-05-25 RX ADMIN — LEVOTHYROXINE SODIUM 75 MCG: 0.07 TABLET ORAL at 06:23

## 2023-05-25 RX ADMIN — ASPIRIN 81 MG CHEWABLE TABLET 81 MG: 81 TABLET CHEWABLE at 09:31

## 2023-05-25 RX ADMIN — HEPARIN SODIUM 5000 UNITS: 5000 INJECTION INTRAVENOUS; SUBCUTANEOUS at 13:16

## 2023-05-25 RX ADMIN — INSULIN LISPRO 1 UNITS: 100 INJECTION, SOLUTION INTRAVENOUS; SUBCUTANEOUS at 17:33

## 2023-05-25 RX ADMIN — AMLODIPINE BESYLATE 5 MG: 5 TABLET ORAL at 09:40

## 2023-05-25 RX ADMIN — INSULIN GLARGINE 5 UNITS: 100 INJECTION, SOLUTION SUBCUTANEOUS at 20:34

## 2023-05-25 RX ADMIN — SENNOSIDES AND DOCUSATE SODIUM 2 TABLET: 50; 8.6 TABLET ORAL at 09:40

## 2023-05-25 RX ADMIN — CARVEDILOL 25 MG: 25 TABLET, FILM COATED ORAL at 09:31

## 2023-05-25 RX ADMIN — POLYETHYLENE GLYCOL 3350 17 G: 17 POWDER, FOR SOLUTION ORAL at 09:45

## 2023-05-25 RX ADMIN — ALLOPURINOL 50 MG: 100 TABLET ORAL at 09:31

## 2023-05-25 RX ADMIN — HEPARIN SODIUM 5000 UNITS: 5000 INJECTION INTRAVENOUS; SUBCUTANEOUS at 20:34

## 2023-05-25 NOTE — DISCHARGE INSTR - COC
Concerns:     History of Falls (last 30 days), At Risk for Falls, and Aspiration Risk    Impairments/Disabilities:      Vision and Hearing      Patient's personal belongings (please select all that are sent with patient):  Glasses    RN SIGNATURE:  Electronically signed by Dayna Barbosa RN on 5/26/23 at 3:08 PM EDT      PHYSICIAN SECTION    Prognosis: Fair    Condition at Discharge: Stable    Rehab Potential (if transferring to Rehab): Good    Recommended Labs or Other Treatments After Discharge: WEEKLY CBC/BMP    Nutrition Therapy:  Current Nutrition Therapy:   - Oral Diet:  General    Routes of Feeding: Oral  Liquids:   Daily Fluid Restriction: no  Last Modified Barium Swallow with Video (Video Swallowing Test): not done    Treatments at the Time of Hospital Discharge:   Respiratory Treatments:   Oxygen Therapy:    Ventilator:    - No ventilator support    Rehab Therapies: Physical Therapy and Occupational Therapy  Weight Bearing Status/Restrictions: No weight bearing restrictions  Other Medical Equipment (for information only, NOT a DME order): Other Treatments:     Physician Certification: I certify the above information and transfer of Maddie Swan  is necessary for the continuing treatment of the diagnosis listed and that he requires [facility] for greater 30 days.      Update Admission H&P: No change in H&P    PHYSICIAN SIGNATURE:  Electronically signed by Abran Milian MD on 5/26/23 at 10:55 AM EDT

## 2023-05-25 NOTE — CARE COORDINATION
Pt will be returning to [facility]. No precert is needed. Pt will need to have a rapid COVID on day of discharge. CM will need RN and doctor to complete MYRIAM. If pt is discharged after hours or over the weekend please complete the following. ... Call report to [phone]  Fax completed AVS with both MYRIAM on the AVS and any written Rx [phone]. Set up transportation [address] [facility] and call family. Pt on discharge to return to [facility]. [facility] to  pt at 4:00. LSW faxed AVS with both MYRIAM . RN and NH aware of  time. [facility] paperwork completed and placed on packet.

## 2023-05-26 VITALS
TEMPERATURE: 97.7 F | DIASTOLIC BLOOD PRESSURE: 82 MMHG | RESPIRATION RATE: 13 BRPM | BODY MASS INDEX: 31.69 KG/M2 | OXYGEN SATURATION: 97 % | WEIGHT: 201.93 LBS | HEIGHT: 67 IN | SYSTOLIC BLOOD PRESSURE: 151 MMHG | HEART RATE: 63 BPM

## 2023-05-26 LAB
ANION GAP SERPL CALCULATED.3IONS-SCNC: 10 MMOL/L (ref 4–16)
BUN SERPL-MCNC: 33 MG/DL (ref 6–23)
CALCIUM SERPL-MCNC: 8.8 MG/DL (ref 8.3–10.6)
CHLORIDE BLD-SCNC: 104 MMOL/L (ref 99–110)
CO2: 24 MMOL/L (ref 21–32)
CREAT SERPL-MCNC: 2 MG/DL (ref 0.9–1.3)
GFR SERPL CREATININE-BSD FRML MDRD: 32 ML/MIN/1.73M2
GLUCOSE BLD-MCNC: 161 MG/DL (ref 70–99)
GLUCOSE BLD-MCNC: 206 MG/DL (ref 70–99)
GLUCOSE SERPL-MCNC: 189 MG/DL (ref 70–99)
HCT VFR BLD CALC: 27.9 % (ref 42–52)
HEMOGLOBIN: 8.6 GM/DL (ref 13.5–18)
MCH RBC QN AUTO: 29.4 PG (ref 27–31)
MCHC RBC AUTO-ENTMCNC: 30.8 % (ref 32–36)
MCV RBC AUTO: 95.2 FL (ref 78–100)
PDW BLD-RTO: 17.2 % (ref 11.7–14.9)
PLATELET # BLD: 271 K/CU MM (ref 140–440)
PMV BLD AUTO: 11.4 FL (ref 7.5–11.1)
POTASSIUM SERPL-SCNC: 4.6 MMOL/L (ref 3.5–5.1)
RBC # BLD: 2.93 M/CU MM (ref 4.6–6.2)
SARS-COV-2 RDRP RESP QL NAA+PROBE: NOT DETECTED
SODIUM BLD-SCNC: 138 MMOL/L (ref 135–145)
SOURCE: NORMAL
WBC # BLD: 6.8 K/CU MM (ref 4–10.5)

## 2023-05-26 PROCEDURE — 97112 NEUROMUSCULAR REEDUCATION: CPT

## 2023-05-26 PROCEDURE — 85027 COMPLETE CBC AUTOMATED: CPT

## 2023-05-26 PROCEDURE — 94761 N-INVAS EAR/PLS OXIMETRY MLT: CPT

## 2023-05-26 PROCEDURE — 36415 COLL VENOUS BLD VENIPUNCTURE: CPT

## 2023-05-26 PROCEDURE — 80048 BASIC METABOLIC PNL TOTAL CA: CPT

## 2023-05-26 PROCEDURE — 87635 SARS-COV-2 COVID-19 AMP PRB: CPT

## 2023-05-26 PROCEDURE — 6360000002 HC RX W HCPCS: Performed by: STUDENT IN AN ORGANIZED HEALTH CARE EDUCATION/TRAINING PROGRAM

## 2023-05-26 PROCEDURE — 97163 PT EVAL HIGH COMPLEX 45 MIN: CPT

## 2023-05-26 PROCEDURE — 6370000000 HC RX 637 (ALT 250 FOR IP): Performed by: INTERNAL MEDICINE

## 2023-05-26 PROCEDURE — 82962 GLUCOSE BLOOD TEST: CPT

## 2023-05-26 PROCEDURE — 6370000000 HC RX 637 (ALT 250 FOR IP): Performed by: STUDENT IN AN ORGANIZED HEALTH CARE EDUCATION/TRAINING PROGRAM

## 2023-05-26 PROCEDURE — 0202U NFCT DS 22 TRGT SARS-COV-2: CPT

## 2023-05-26 PROCEDURE — 97530 THERAPEUTIC ACTIVITIES: CPT

## 2023-05-26 PROCEDURE — 2580000003 HC RX 258: Performed by: STUDENT IN AN ORGANIZED HEALTH CARE EDUCATION/TRAINING PROGRAM

## 2023-05-26 RX ORDER — AMLODIPINE BESYLATE 10 MG/1
10 TABLET ORAL DAILY
Qty: 30 TABLET | Refills: 3 | Status: SHIPPED | OUTPATIENT
Start: 2023-05-27

## 2023-05-26 RX ADMIN — SODIUM CHLORIDE, PRESERVATIVE FREE 10 ML: 5 INJECTION INTRAVENOUS at 10:25

## 2023-05-26 RX ADMIN — ASPIRIN 81 MG CHEWABLE TABLET 81 MG: 81 TABLET CHEWABLE at 10:25

## 2023-05-26 RX ADMIN — SENNOSIDES AND DOCUSATE SODIUM 2 TABLET: 50; 8.6 TABLET ORAL at 10:25

## 2023-05-26 RX ADMIN — POLYETHYLENE GLYCOL 3350 17 G: 17 POWDER, FOR SOLUTION ORAL at 10:25

## 2023-05-26 RX ADMIN — LEVOTHYROXINE SODIUM 75 MCG: 0.07 TABLET ORAL at 06:41

## 2023-05-26 RX ADMIN — HEPARIN SODIUM 5000 UNITS: 5000 INJECTION INTRAVENOUS; SUBCUTANEOUS at 13:25

## 2023-05-26 RX ADMIN — PANTOPRAZOLE SODIUM 40 MG: 40 TABLET, DELAYED RELEASE ORAL at 06:41

## 2023-05-26 RX ADMIN — AMLODIPINE BESYLATE 10 MG: 10 TABLET ORAL at 10:25

## 2023-05-26 RX ADMIN — FERROUS SULFATE TAB 325 MG (65 MG ELEMENTAL FE) 325 MG: 325 (65 FE) TAB at 10:25

## 2023-05-26 RX ADMIN — HEPARIN SODIUM 5000 UNITS: 5000 INJECTION INTRAVENOUS; SUBCUTANEOUS at 06:41

## 2023-05-26 RX ADMIN — INSULIN LISPRO 1 UNITS: 100 INJECTION, SOLUTION INTRAVENOUS; SUBCUTANEOUS at 13:25

## 2023-05-26 RX ADMIN — CARVEDILOL 25 MG: 25 TABLET, FILM COATED ORAL at 10:25

## 2023-05-26 RX ADMIN — TORSEMIDE 20 MG: 20 TABLET ORAL at 10:25

## 2023-05-26 NOTE — PROGRESS NOTES
05/25/23 0716   Encounter Summary   Encounter Overview/Reason  Spiritual/Emotional Needs;Palliative Care   Service Provided For: Patient   Referral/Consult From: [facility]   Last Encounter  05/25/23  (Patient is facing end-of-life issues and has some fears.  offered prayer and spiritual support. Patient does not have a Living Will and needs to have a conversation with his sons.)   Complexity of Encounter Moderate   Begin Time 0710   End Time  0719   Total Time Calculated 9 min   Encounter    Type Initial Screen/Assessment   Crisis   Type Emotional distress   Spiritual/Emotional needs   Type Difficult news received   Grief, Loss, and Adjustments   Type End of Life;New Diagnosis   Advance Care Planning   Type ACP conversation   Assessment/Intervention/Outcome   Assessment Despair   Intervention Active listening;Discussed belief system/[employer] practices/ern[place];Discussed illness injury and its impact; Discussed relationship with God;Empowerment;End of Life Care;Nurtured Hope;Sustaining Presence/Ministry of presence;Prayer (assurance of)/[place]   Outcome Expressed feelings, needs, and concerns;Expressed Gratitude;Receptive   Plan and Referrals   Plan/Referrals Continue Support (comment); Continue to visit, (comment)  (As needed for end-of-life and family care)
05/25/23 1124   Encounter Summary   Encounter Overview/Reason  Spiritual/Emotional Needs   Service Provided For: Patient   Referral/Consult From: [facility]   Last Encounter  05/25/23  (Patient ask ofor  to return but was unable to discuss emotions and feelings about his medical condition and end-of-life wishes. Patient expressed that is was hard to speak to his sons.)   Complexity of Encounter Moderate   Begin Time 1110   End Time  1126   Total Time Calculated 16 min   Encounter    Type Follow up   Crisis   Type Emotional distress   Spiritual/Emotional needs   Type Difficult news received   Grief, Loss, and Adjustments   Type End of Life   Assessment/Intervention/Outcome   Assessment Concerns with suffering   Intervention Nurtured Hope;Sustaining Presence/Ministry of presence;Empowerment   Outcome Engaged in conversation; Other (comment)  (Unable to discuss the end-of life feelings)   Plan and Referrals   Plan/Referrals Continue Support (comment)  (Patient as needed)
Full report called to Keshia Wade RN at [facility].
Occupational Therapy    [facility] ACUTE CARE OCCUPATIONAL THERAPY EVALUATION  Jenna Castro, 1940, 3006/3006-A, 5/24/2023    History  [place]:  The encounter diagnosis was Urinary tract infection associated with indwelling urethral catheter, initial encounter ([address] Patient  has a past medical history of Acute kidney failure ([address] Acute urinary tract infection, Anemia, Ataxia, Ataxia, Bacteremia, Candidiasis, Chronic combined systolic and diastolic congestive heart failure ([address] Chronic kidney disease, Cognitive communication deficit, Diabetes mellitus ([address] Extended spectrum beta lactamase (ESBL) resistance, Fusion of spine of cervical region, Gait disturbance, History of prostate cancer, History of tobacco use, Hydronephrosis, Hyperkalemia, Hyperlipidemia, Hypertension, Hypertensive heart disease with CHF (congestive heart failure) ([address] Hypotension, Immunodeficiency ([address] Metabolic encephalopathy, Muscle weakness, Osteoarthritis, Osteoarthritis, Secondary Hyperaldosteronism ([address] Supraventricular tachycardia ([address] and Tubulo-interstitial nephritis. Patient  has a past surgical history that includes Prostate surgery; other surgical history (03/28/2013); Colon surgery; Bladder surgery; Prostatectomy (1996); Bladder surgery (Left, 03/17/2022); and Cystoscopy (Left, 09/07/2022). Subjective:  Patient states:  \"I'll sit up\". Pain:  No.    Communication with other providers:  Handoff to RN  Restrictions: Contact Precautions, General Precautions, Fall Risk    Home Setup/Prior level of function  Social/Functional History  Type of Home: Facility  Pt reports he is usually up in a wheelchair stand pivot w/ assist, pt reports needing assistance for ADLs.  Pt is a poor historian and above information must be verified at a later time    Examination of body systems (includes body structures/functions, activity/participation limitations):  Observation:  Supine in bed upon arrival, agreeable to
Physical Therapy  Facility/Department: [address] [place] [address]  Physical Therapy Initial Assessment    Name: Britt Bloch  : 1940  MRN: [id number]  Date of Service: 2023    Discharge Recommendations:  [address]          Patient Diagnosis(es): The encounter diagnosis was Urinary tract infection associated with indwelling urethral catheter, initial encounter ([address] Past Medical History:  has a past medical history of Acute kidney failure ([address] Acute urinary tract infection, Anemia, Ataxia, Ataxia, Bacteremia, Candidiasis, Chronic combined systolic and diastolic congestive heart failure ([address] Chronic kidney disease, Cognitive communication deficit, Diabetes mellitus ([address] Extended spectrum beta lactamase (ESBL) resistance, Fusion of spine of cervical region, Gait disturbance, History of prostate cancer, History of tobacco use, Hydronephrosis, Hyperkalemia, Hyperlipidemia, Hypertension, Hypertensive heart disease with CHF (congestive heart failure) ([address] Hypotension, Immunodeficiency ([address] Metabolic encephalopathy, Muscle weakness, Osteoarthritis, Osteoarthritis, Secondary Hyperaldosteronism ([address] Supraventricular tachycardia ([address] and Tubulo-interstitial nephritis. Past Surgical History:  has a past surgical history that includes Prostate surgery; other surgical history (2013); Colon surgery; Bladder surgery; Prostatectomy (); Bladder surgery (Left, 2022); and Cystoscopy (Left, 2022). Assessment   Body Structures, Functions, Activity Limitations Requiring Skilled Therapeutic Intervention: Decreased functional mobility ; Decreased safe awareness;Decreased high-level IADLs;Decreased ADL status; Decreased endurance;Decreased balance;Decreased strength;Decreased posture;Decreased cognition  Therapy Prognosis: Good  Decision Making: High Complexity  Clinical Presentation: unpredictable characteristics  Requires PT Follow-Up: Yes  Activity Tolerance  Activity Tolerance:
Physician Progress Note      PATIENT:               Dariusz Lerma  CSN #:                  [id number]  :                       1940  ADMIT DATE:       2023 6:49 PM  [address] [place] DATE:  RESPONDING  PROVIDER #:        Moi Pat MD          QUERY TEXT:    Pt admitted with UTI. Pt noted to have SP Catheter with Urinary tract   infection associated with indwelling urethral catheter. If possible, please   document in the progress notes and discharge summary if you are evaluating   and/or treating any of the following: The medical record reflects the following:  Risk Factors: Indwelling SPC, Acute Cystitis  Clinical Indicators: \"Acute cystitis-has SPC. Last exchanged about a week   ago. UA abnormal.  On meropenem due to ESBL E. coli history. Abnormal UA may   be colonization. Follow-up cultures. Urinalysis obtained from catheter and   shows large leukocytes 146 red blood cells, 1260 white blood cells many   bacteria clumps.   Treatment: Unasyn patient was ordered 3 g IV, 500 mL bolus of IV fluids,   Meropenem    Thank you, Cherie Lara RN, CDS ([phone])  Options provided:  -- UTI due to suprapubic catheter  -- UTI not due to suprapubic catheter  -- Other - I will add my own diagnosis  -- Disagree - Not applicable / Not valid  -- Disagree - Clinically unable to determine / Unknown  -- Refer to Clinical Documentation Reviewer    PROVIDER RESPONSE TEXT:    Uti ruled out    Query created by: Sharad Obrien on 2023 8:12 AM      Electronically signed by:  Moi Pat MD 2023 1:27 PM
V2.0  [facility] Hospitalist Progress Note      Name:  Yadi Stephens /Age/Sex: 1940  (78 y.o. male)   MRN & CSN:  [id number] & [id number] Encounter Date/Time: 2023 10:22 AM EDT    Location:  [facility] PCP: Darshana Yen MD       Hospital Day: 3    Assessment and Plan:   Yadi Stephens is a 78 y.o. male who presents with altered mental status    #Acute encephalopathy  - Unknown etiology  - Initially thought to be due to a UTI. UA showed 1260 WBC and large leukocyte esterase. Urine culture showed Candida albicans which is likely colonization  - Spoke to patient's son, patient was alert and oriented 2 weeks prior. Since then he had a change in mental status, went to have poor appetite and he also aspirated prior to coming into hospital.  -- Obtain CT head without contrast  -- Start gentle hydration  - Obtain blood cultures  --Hold Lyrica  -- Also consult neurology       # Acute cystitis with hematuria  - Allegedly \"not being himself\" and confused at [facility], thus sent for evaluation. Son denied any significant changes. Hx of ESBL E coli. Has SPC, last exchanged about a week ago. - Afebrile, no leukocytosis. UA positive for infection. CXR non-acute. - Started on Unasyn in the ED, switched to Merrem due to hx of ESBL E coli for now. -Urine culture growing Candida albicans  -DC meropenem     # CKD4  - Renal function stable. - Renally-dose meds, avoid nephrotoxic agents. # Non-MI troponin elevation  - Denied any typical CP.  - Initial Tn mildly elevated, unclear why it was checked. ECG without acute ischemic changes. - Likely secondary to poor clearance from renal disease. # Anemia of chronic disease  - H/H at baseline.   - Anemia panel in 3/2023 consistent with AoCD. # T2DM  - Last A1c 6.6% in 2022, repeat pending.   -Hypoglycemic in the afternoon. Hold insulin for now     # Essential hypretension  - Continue home Coreg, Clonidine and Norvasc.      # Acquired hypothyroidism  -
V2.0  [facility] [place]      Name:  Rylee Padron /Age/Sex: 1940  (78 y.o. male)   MRN & CSN:  [id number] & [id number] Encounter Date/Time: 2023 10:22 AM EDT    Location:  [address] [place] [address] PCP: Meño White MD       Hospital Day: 2    Assessment and Plan:   Rylee Padron is a 78 y.o. male who presents with altered mental status      Plan:  Acute metabolic encephalopathy-likely due to UTI  Acute cystitis-has SPC. Last exchanged about a week ago. UA abnormal.  On meropenem due to ESBL E. coli history. Abnormal UA may be colonization. Follow-up cultures. CKD 4-creatinine 2.1. Elevated troponin-denies typical chest pain. Elevated troponin likely due to CKD. DM2-on Lantus and prandial insulin. A1c 8.8. HTN-on amlodipine, carvedilol, torsemide  Anemia of chronic disease-hemoglobin 9.1. Anemia of chronic disease  HTN  Hypothyroidism  Prostate cancer status post prostatectomy in     Diet ADULT DIET; Regular; 5 carb choices (75 gm/meal);  Low Fat/Low Chol/High Fiber/JORGE; Low Sodium (2 gm)    DVT Prophylaxis [] Lovenox, [x] Heparin, [] Eliquis, [] Xarelto  [] SCDs       Code Status DNR-CCA   Disposition From: SNF  Expected Disposition: TBD  Estimated Date of Discharge:   Patient requires continued admission due to cystitis         Personally reviewed Lab Studies and Imaging       Subjective/Interval history:   Chief Complaint: Altered Mental Status     Has difficulty recalling what happened  He does admit to having confusion  States he has been urinating more    Review of Systems:    Negative unless mentioned above    Objective:   No intake or output data in the 24 hours ending 23 1022     Vitals:   BP (!) 153/80   Pulse 70   Temp 98.2 °F (36.8 °C) (Oral)   Resp 13   Ht 5' 7\" (1.702 m)   Wt 207 lb 0.2 oz (93.9 kg)   SpO2 98%   BMI 32.42 kg/m²     Physical Exam:   General: NAD  Eyes: no discharge  HENT: NCAT  Cardiovascular: RRR  Respiratory: Clear to
V2.0  [place] Hospitalist Progress Note      Name:  Mariela Gupta /Age/Sex: 1940  (78 y.o. male)   MRN & CSN:  [id number] & [id number] Encounter Date/Time: 2023 10:22 AM EDT    Location:  9192/1029-D PCP: Dotty Garcia MD       Hospital Day: 4    Assessment and Plan:   Mariela Gupta is a 78 y.o. male who presents with altered mental status    #Acute encephalopathy  - Unknown etiology  - Initially thought to be due to a UTI. UA showed 1260 WBC and large leukocyte esterase. Urine culture showed Candida albicans which is likely colonization  - Spoke to patient's son, patient was alert and oriented 2 weeks prior. Since then he had a change in mental status, went to have poor appetite and he also aspirated prior to coming into hospital.  -- CT head without contrast without any acute intracranial abnormality  --Patient doing better today. He was able to tell his birthdate, and age . -- No metabolic impairment to suggest acute encephalopathy  --Neurology to see      # Acute cystitis with hematuria  -UA abnormal likely in the setting of chronic Hopper catheter. UTI ruled out  - Allegedly \"not being himself\" and confused at [facility], thus sent for evaluation. Son denied any significant changes. Hx of ESBL E coli. Has SPC, last exchanged about a week ago. - Afebrile, no leukocytosis. UA positive for infection. CXR non-acute. - Started on Unasyn in the ED, switched to Merrem due to hx of ESBL E coli for now. -Urine culture 2023 growing Candida albicans  -DC meropenem     # CKD4  - Renal function stable. - Renally-dose meds, avoid nephrotoxic agents. # Non-MI troponin elevation  - Denied any typical CP.  - Initial Tn mildly elevated, unclear why it was checked. ECG without acute ischemic changes. - Likely secondary to poor clearance from renal disease. # Anemia of chronic disease  - H/H at baseline.   - Anemia panel in 3/2023 consistent with AoCD.      # T2DM  - Last A1c 6.6% in 2022,
V2.0  [facility] [place] Note      Name:  Rylee Padron /Age/Sex: 1940  (78 y.o. male)   MRN & CSN:  [id number] & [id number] Encounter Date/Time: 2023 10:22 AM EDT    [facility]:  [facility] PCP: Meño White MD       Hospital Day: 5    Assessment and Plan:   Rylee Padron is a 78 y.o. male who presents with altered mental status    # Acute encephalopathy,  likely underlying dementia complicated by metabolic derangement, resolving  Initially thought to be due to a UTI. UA showed 1260 WBC and large leukocyte esterase. Urine culture showed Candida albicans which is likely colonization. Patient was started on meropenem but has been stopped since culture results. CT head without contrast without any acute intracranial abnormality. Neurology team were consulted who report the same, no further recommendation than current management. Spoke to patient's son, who reported that patient was alert and oriented 2 weeks prior, since then he had a change in mental status, went to have poor appetite and he also aspirated prior to coming into hospital. He also reports that patient is usually \"oriented\" at baseline, unclear to the degree of orientation, but gets altered every time he has UTI. --Patient continues to do better today. He continued to be able to tell his name, birth date, age, current location. He was unable to tell the current date or time, cognition and recent memory appear impaired, he is tangential and drifts into the past, does not seem to recall that he has been staying at the [facility] home and seems to think that he drove himself to the hospital  --plan to monitor today with resumption of his insulin and escalation of anti-hypetensives  --would continue to hold pregabalin at discharge  --discussed with son about discharge tomorrow if patient continues to do same    # Acute cystitis with hematuria  -UA abnormal likely in the setting of chronic Hopper catheter.   UTI ruled out  -
Meds: PO  Recommendations: Assistance with meals     Compensatory Swallowing Strategies  Compensatory Swallowing Strategies : Upright as possible for all oral intake    Treatment/Goals  Short-term Goals  Timeframe for Short-term Goals: n/a  Long-term Goals  Timeframe for Long-term Goals: n/a    General  Chart Reviewed: Yes  Behavior/Cognition: Alert; Cooperative;Pleasant mood  Respiratory Status: Room air  Communication Observation: Dysarthria  Follows Directions: Complex  Dentition: Edentulous  Patient Positioning: Upright in bed  Baseline Vocal Quality: Normal  Volitional Cough:  (WFL)  Prior Dysphagia History: denies, none charted  Consistencies Administered: Regular;Pureed; Thin - straw; Thin - cup    Vision/Hearing  Hearing  Hearing: Within functional limits    Oral Motor Deficits  Oral/Motor  Oral Hygiene: Moist;Clean    Oral Phase Dysfunction  Oral Phase  Oral Phase: Exceptions  Oral Phase  Oral Phase - Comment: Mild     Indicators of Pharyngeal Phase Dysfunction   Pharyngeal Phase   Pharyngeal Phase: WNL    Prognosis  Individuals consulted  Consulted and agree with results and recommendations: Patient    Education  Patient Education: pharyngeal swallow WNL, mild oral dysphagia  Patient Education Response: Demonstrated understanding  Safety Devices in place: Yes  Type of devices: All fall risk precautions in place; Left in bed       Therapy Time  In/Out:  [address] [facility]  5/24/2023 9:27 AM

## 2023-05-26 NOTE — DISCHARGE SUMMARY
TSH  Troponin:   Lab Results   Component Value Date/Time    TROPONINT 0.038 05/21/2023 07:14 PM    TROPONINT 0.044 10/24/2022 12:20 PM    TROPONINT 0.030 04/20/2022 07:30 AM     Lactic Acid: No results for input(s): LACTA in the last 72 hours. BNP: No results for input(s): PROBNP in the last 72 hours.   UA:  Lab Results   Component Value Date/Time    NITRU NEGATIVE 05/21/2023 08:39 PM    NITRU NEGATIVE 03/23/2013 02:24 PM    COLORU YELLOW 05/21/2023 08:39 PM    [address] 05/21/2023 08:39 PM    RBCUA 146 05/21/2023 08:39 PM    MUCUS OCCASIONAL 04/13/2023 09:00 PM    TRICHOMONAS NONE SEEN 05/21/2023 08:39 PM    YEAST FEW 04/06/2022 04:45 PM    BACTERIA NEGATIVE 05/21/2023 08:39 PM    CLARITYU SLIGHTLY CLOUDY 05/21/2023 08:39 PM    SPECGRAV 1.020 05/21/2023 08:39 PM    LEUKOCYTESUR LARGE NUMBER OR AMOUNT OBSERVED 05/21/2023 08:39 PM    UROBILINOGEN 0.2 05/21/2023 08:39 PM    BILIRUBINUR NEGATIVE 05/21/2023 08:39 PM    BLOODU LARGE NUMBER OR AMOUNT OBSERVED 05/21/2023 08:39 PM    GLUCOSEU 1,000 03/23/2013 02:24 PM    Rebeka Karli NEGATIVE 05/21/2023 08:39 PM    AMORPHOUS RARE 11/16/2021 12:28 PM     Urine Cultures: No results found for: LABURIN  Blood Cultures: No results found for: BC  No results found for: BLOODCULT2  Organism:   Lab Results   Component Value Date/Time    [facility] 02/11/2018 04:35 AM       Time Spent Discharging patient 35 minutes    Electronically signed by Frank Fox MD on 5/26/2023 at 10:55 AM

## 2023-05-28 LAB
CULTURE: NORMAL
CULTURE: NORMAL
Lab: NORMAL
Lab: NORMAL
SPECIMEN: NORMAL
SPECIMEN: NORMAL

## 2023-05-30 ENCOUNTER — HOSPITAL ENCOUNTER (OUTPATIENT)
Age: 83
Setting detail: SPECIMEN
Discharge: HOME | End: 2023-05-30

## 2023-05-30 LAB
ALBUMIN SERPL-MCNC: 3.3 GM/DL (ref 3.4–5)
ALP BLD-CCNC: 98 IU/L (ref 40–128)
ALT SERPL-CCNC: 10 U/L (ref 10–40)
ANION GAP SERPL CALCULATED.3IONS-SCNC: 11 MMOL/L (ref 4–16)
AST SERPL-CCNC: 17 IU/L (ref 15–37)
BILIRUB SERPL-MCNC: 0.3 MG/DL (ref 0–1)
BUN SERPL-MCNC: 26 MG/DL (ref 6–23)
CALCIUM SERPL-MCNC: 8.7 MG/DL (ref 8.3–10.6)
CHLORIDE BLD-SCNC: 107 MMOL/L (ref 99–110)
CHOLEST SERPL-MCNC: 171 MG/DL
CO2: 25 MMOL/L (ref 21–32)
CREAT SERPL-MCNC: 2.1 MG/DL (ref 0.9–1.3)
ESTIMATED AVERAGE GLUCOSE: 194 MG/DL
GFR SERPL CREATININE-BSD FRML MDRD: 30 ML/MIN/1.73M2
GLUCOSE SERPL-MCNC: 133 MG/DL (ref 70–99)
HBA1C MFR BLD: 8.4 % (ref 4.2–6.3)
HCT VFR BLD CALC: 28 % (ref 42–52)
HDLC SERPL-MCNC: 43 MG/DL
HEMOGLOBIN: 8.5 GM/DL (ref 13.5–18)
LDLC SERPL CALC-MCNC: 102 MG/DL
MCH RBC QN AUTO: 29.2 PG (ref 27–31)
MCHC RBC AUTO-ENTMCNC: 30.4 % (ref 32–36)
MCV RBC AUTO: 96.2 FL (ref 78–100)
PDW BLD-RTO: 17.8 % (ref 11.7–14.9)
PLATELET # BLD: 237 K/CU MM (ref 140–440)
PMV BLD AUTO: 11.6 FL (ref 7.5–11.1)
POTASSIUM SERPL-SCNC: 4.7 MMOL/L (ref 3.5–5.1)
RBC # BLD: 2.91 M/CU MM (ref 4.6–6.2)
SODIUM BLD-SCNC: 143 MMOL/L (ref 135–145)
TOTAL PROTEIN: 6.3 GM/DL (ref 6.4–8.2)
TRIGL SERPL-MCNC: 129 MG/DL
URATE SERPL-MCNC: 4.3 MG/DL (ref 3.5–7.2)
WBC # BLD: 7.1 K/CU MM (ref 4–10.5)

## 2023-05-30 PROCEDURE — 80061 LIPID PANEL: CPT

## 2023-05-30 PROCEDURE — 83036 HEMOGLOBIN GLYCOSYLATED A1C: CPT

## 2023-05-30 PROCEDURE — 85027 COMPLETE CBC AUTOMATED: CPT

## 2023-05-30 PROCEDURE — 84550 ASSAY OF BLOOD/URIC ACID: CPT

## 2023-05-30 PROCEDURE — 80053 COMPREHEN METABOLIC PANEL: CPT

## 2023-06-05 ENCOUNTER — HOSPITAL ENCOUNTER (OUTPATIENT)
Age: 83
Setting detail: SPECIMEN
Discharge: HOME | End: 2023-06-05

## 2023-06-05 LAB
ALBUMIN SERPL-MCNC: 3.4 GM/DL (ref 3.4–5)
ALP BLD-CCNC: 95 IU/L (ref 40–128)
ALT SERPL-CCNC: 9 U/L (ref 10–40)
ANION GAP SERPL CALCULATED.3IONS-SCNC: 12 MMOL/L (ref 4–16)
AST SERPL-CCNC: 13 IU/L (ref 15–37)
BILIRUB SERPL-MCNC: 0.3 MG/DL (ref 0–1)
BUN SERPL-MCNC: 32 MG/DL (ref 6–23)
CALCIUM SERPL-MCNC: 8.9 MG/DL (ref 8.3–10.6)
CHLORIDE BLD-SCNC: 106 MMOL/L (ref 99–110)
CO2: 23 MMOL/L (ref 21–32)
CREAT SERPL-MCNC: 2 MG/DL (ref 0.9–1.3)
GFR SERPL CREATININE-BSD FRML MDRD: 32 ML/MIN/1.73M2
GLUCOSE SERPL-MCNC: 124 MG/DL (ref 70–99)
HCT VFR BLD CALC: 28.4 % (ref 42–52)
HEMOGLOBIN: 8.8 GM/DL (ref 13.5–18)
MCH RBC QN AUTO: 29.6 PG (ref 27–31)
MCHC RBC AUTO-ENTMCNC: 31 % (ref 32–36)
MCV RBC AUTO: 95.6 FL (ref 78–100)
PDW BLD-RTO: 17.8 % (ref 11.7–14.9)
PLATELET # BLD: 258 K/CU MM (ref 140–440)
PMV BLD AUTO: 11.6 FL (ref 7.5–11.1)
POTASSIUM SERPL-SCNC: 4.9 MMOL/L (ref 3.5–5.1)
RBC # BLD: 2.97 M/CU MM (ref 4.6–6.2)
SODIUM BLD-SCNC: 141 MMOL/L (ref 135–145)
TOTAL PROTEIN: 6.6 GM/DL (ref 6.4–8.2)
TSH SERPL DL<=0.005 MIU/L-ACNC: 2.95 UIU/ML (ref 0.27–4.2)
WBC # BLD: 6.7 K/CU MM (ref 4–10.5)

## 2023-06-05 PROCEDURE — 84443 ASSAY THYROID STIM HORMONE: CPT

## 2023-06-05 PROCEDURE — 80053 COMPREHEN METABOLIC PANEL: CPT

## 2023-06-05 PROCEDURE — 36415 COLL VENOUS BLD VENIPUNCTURE: CPT

## 2023-06-05 PROCEDURE — 85027 COMPLETE CBC AUTOMATED: CPT

## 2023-06-16 ENCOUNTER — HOSPITAL ENCOUNTER (OUTPATIENT)
Dept: INFUSION THERAPY | Age: 83
Discharge: HOME | End: 2023-06-16
Payer: MEDICARE

## 2023-06-16 ENCOUNTER — OFFICE VISIT (OUTPATIENT)
Dept: ONCOLOGY | Age: 83
End: 2023-06-16
Payer: MEDICARE

## 2023-06-16 VITALS
DIASTOLIC BLOOD PRESSURE: 65 MMHG | HEIGHT: 67 IN | TEMPERATURE: 97.6 F | OXYGEN SATURATION: 99 % | SYSTOLIC BLOOD PRESSURE: 120 MMHG | BODY MASS INDEX: 31.63 KG/M2 | HEART RATE: 59 BPM

## 2023-06-16 DIAGNOSIS — D64.9 ANEMIA, UNSPECIFIED TYPE: Primary | ICD-10-CM

## 2023-06-16 DIAGNOSIS — D64.9 ANEMIA, UNSPECIFIED TYPE: ICD-10-CM

## 2023-06-16 LAB
BASOPHILS ABSOLUTE: 0 K/CU MM
BASOPHILS RELATIVE PERCENT: 0.3 % (ref 0–1)
DIFFERENTIAL TYPE: ABNORMAL
EOSINOPHILS ABSOLUTE: 0.5 K/CU MM
EOSINOPHILS RELATIVE PERCENT: 7.2 % (ref 0–3)
FERRITIN: 538 NG/ML (ref 30–400)
HCT VFR BLD CALC: 28.3 % (ref 42–52)
HEMOGLOBIN: 8.5 GM/DL (ref 13.5–18)
IRON: 54 UG/DL (ref 59–158)
LYMPHOCYTES ABSOLUTE: 1.7 K/CU MM
LYMPHOCYTES RELATIVE PERCENT: 25.2 % (ref 24–44)
MCH RBC QN AUTO: 30.4 PG (ref 27–31)
MCHC RBC AUTO-ENTMCNC: 30 % (ref 32–36)
MCV RBC AUTO: 101.1 FL (ref 78–100)
MONOCYTES ABSOLUTE: 0.5 K/CU MM
MONOCYTES RELATIVE PERCENT: 6.8 % (ref 0–4)
PCT TRANSFERRIN: 22 % (ref 10–44)
PDW BLD-RTO: 18.2 % (ref 11.7–14.9)
PLATELET # BLD: 246 K/CU MM (ref 140–440)
PMV BLD AUTO: 11 FL (ref 7.5–11.1)
RBC # BLD: 2.8 M/CU MM (ref 4.6–6.2)
SEGMENTED NEUTROPHILS ABSOLUTE COUNT: 4.1 K/CU MM
SEGMENTED NEUTROPHILS RELATIVE PERCENT: 60.5 % (ref 36–66)
TOTAL IRON BINDING CAPACITY: 242 UG/DL (ref 250–450)
UNSATURATED IRON BINDING CAPACITY: 188 UG/DL (ref 110–370)
WBC # BLD: 6.8 K/CU MM (ref 4–10.5)

## 2023-06-16 PROCEDURE — 36415 COLL VENOUS BLD VENIPUNCTURE: CPT

## 2023-06-16 PROCEDURE — 85025 COMPLETE CBC W/AUTO DIFF WBC: CPT

## 2023-06-16 PROCEDURE — G8417 CALC BMI ABV UP PARAM F/U: HCPCS | Performed by: INTERNAL MEDICINE

## 2023-06-16 PROCEDURE — 99211 OFF/OP EST MAY X REQ PHY/QHP: CPT

## 2023-06-16 PROCEDURE — 83550 IRON BINDING TEST: CPT

## 2023-06-16 PROCEDURE — 99213 OFFICE O/P EST LOW 20 MIN: CPT | Performed by: INTERNAL MEDICINE

## 2023-06-16 PROCEDURE — 1123F ACP DISCUSS/DSCN MKR DOCD: CPT | Performed by: INTERNAL MEDICINE

## 2023-06-16 PROCEDURE — 1111F DSCHRG MED/CURRENT MED MERGE: CPT | Performed by: INTERNAL MEDICINE

## 2023-06-16 PROCEDURE — 1036F TOBACCO NON-USER: CPT | Performed by: INTERNAL MEDICINE

## 2023-06-16 PROCEDURE — 82728 ASSAY OF FERRITIN: CPT

## 2023-06-16 PROCEDURE — G8427 DOCREV CUR MEDS BY ELIG CLIN: HCPCS | Performed by: INTERNAL MEDICINE

## 2023-06-16 PROCEDURE — 83540 ASSAY OF IRON: CPT

## 2023-06-16 PROCEDURE — 3078F DIAST BP <80 MM HG: CPT | Performed by: INTERNAL MEDICINE

## 2023-06-16 PROCEDURE — 3074F SYST BP LT 130 MM HG: CPT | Performed by: INTERNAL MEDICINE

## 2023-06-16 RX ORDER — ATORVASTATIN CALCIUM 80 MG/1
TABLET, FILM COATED ORAL
COMMUNITY
Start: 2023-04-30

## 2023-06-16 RX ORDER — TORSEMIDE 10 MG/1
TABLET ORAL
COMMUNITY
Start: 2023-05-01

## 2023-06-16 ASSESSMENT — PATIENT HEALTH QUESTIONNAIRE - PHQ9
SUM OF ALL RESPONSES TO PHQ QUESTIONS 1-9: 0
SUM OF ALL RESPONSES TO PHQ9 QUESTIONS 1 & 2: 0
SUM OF ALL RESPONSES TO PHQ QUESTIONS 1-9: 0
1. LITTLE INTEREST OR PLEASURE IN DOING THINGS: 0
2. FEELING DOWN, DEPRESSED OR HOPELESS: 0
SUM OF ALL RESPONSES TO PHQ QUESTIONS 1-9: 0
SUM OF ALL RESPONSES TO PHQ QUESTIONS 1-9: 0

## 2023-06-19 ENCOUNTER — CLINICAL DOCUMENTATION (OUTPATIENT)
Dept: ONCOLOGY | Age: 83
End: 2023-06-19

## 2023-06-19 NOTE — PROGRESS NOTES
Per physician order call placed informing pt to STOP taking oral iron, recent labs (06.16.23) showed elevated ferritin level of 538 (), pt verbalized understanding, this nurse confirmed next appointment will be 10.19.23 @ 1400, encouraged pt to contact office with any questions or concerns, pt stated he would, contact information provided.

## 2023-06-21 ENCOUNTER — TELEPHONE (OUTPATIENT)
Dept: ONCOLOGY | Age: 83
End: 2023-06-21

## 2023-06-21 NOTE — TELEPHONE ENCOUNTER
Emily @ [phone]  [address] left a message asking for a call back regarding a medication the patient is on, please call her @ [phone]

## 2023-06-22 NOTE — TELEPHONE ENCOUNTER
Attempted to call Maribell Erickson from the [facility] back @ [phone] regarding question about medication; however, no answer. VM left with this RN direct number requesting call back.

## 2023-06-23 ENCOUNTER — CLINICAL DOCUMENTATION (OUTPATIENT)
Dept: ONCOLOGY | Age: 83
End: 2023-06-23

## 2023-06-23 NOTE — PROGRESS NOTES
Received VM from [facility], RN at [facility] regarding oral iron supplement. Called RN back @ [phone] and confirmed that patient is supposed to stop taking oral iron.  Voices understanding and will update med list.

## 2023-07-05 ENCOUNTER — HOSPITAL ENCOUNTER (OUTPATIENT)
Age: 83
Setting detail: SPECIMEN
Discharge: HOME | End: 2023-07-05
Payer: MEDICARE

## 2023-07-05 LAB
ANION GAP SERPL CALCULATED.3IONS-SCNC: 12 MMOL/L (ref 4–16)
BUN SERPL-MCNC: 37 MG/DL (ref 6–23)
CALCIUM SERPL-MCNC: 8.5 MG/DL (ref 8.3–10.6)
CHLORIDE BLD-SCNC: 104 MMOL/L (ref 99–110)
CO2: 21 MMOL/L (ref 21–32)
CREAT SERPL-MCNC: 2.1 MG/DL (ref 0.9–1.3)
GFR SERPL CREATININE-BSD FRML MDRD: 30 ML/MIN/1.73M2
GLUCOSE SERPL-MCNC: 223 MG/DL (ref 70–99)
HCT VFR BLD CALC: 25.8 % (ref 42–52)
HEMOGLOBIN: 8 GM/DL (ref 13.5–18)
MCH RBC QN AUTO: 30.4 PG (ref 27–31)
MCHC RBC AUTO-ENTMCNC: 31 % (ref 32–36)
MCV RBC AUTO: 98.1 FL (ref 78–100)
PDW BLD-RTO: 18.9 % (ref 11.7–14.9)
PLATELET # BLD: 248 K/CU MM (ref 140–440)
PMV BLD AUTO: 10.9 FL (ref 7.5–11.1)
POTASSIUM SERPL-SCNC: 5 MMOL/L (ref 3.5–5.1)
RBC # BLD: 2.63 M/CU MM (ref 4.6–6.2)
SODIUM BLD-SCNC: 137 MMOL/L (ref 135–145)
WBC # BLD: 6.7 K/CU MM (ref 4–10.5)

## 2023-07-05 PROCEDURE — 85027 COMPLETE CBC AUTOMATED: CPT

## 2023-07-05 PROCEDURE — 80048 BASIC METABOLIC PNL TOTAL CA: CPT

## 2023-07-05 PROCEDURE — 36415 COLL VENOUS BLD VENIPUNCTURE: CPT

## 2023-07-07 ENCOUNTER — HOSPITAL ENCOUNTER (OUTPATIENT)
Age: 83
Setting detail: SPECIMEN
Discharge: HOME | End: 2023-07-07
Payer: MEDICARE

## 2023-07-07 PROCEDURE — 87086 URINE CULTURE/COLONY COUNT: CPT

## 2023-07-07 PROCEDURE — 87077 CULTURE AEROBIC IDENTIFY: CPT

## 2023-07-07 PROCEDURE — 81001 URINALYSIS AUTO W/SCOPE: CPT

## 2023-07-07 PROCEDURE — 87186 SC STD MICRODIL/AGAR DIL: CPT

## 2023-07-09 LAB
BACTERIA: NEGATIVE /HPF
BILIRUBIN URINE: NEGATIVE MG/DL
BLOOD, URINE: ABNORMAL
CLARITY: ABNORMAL
COLOR: YELLOW
GLUCOSE, URINE: NEGATIVE MG/DL
KETONES, URINE: NEGATIVE MG/DL
LEUKOCYTE ESTERASE, URINE: ABNORMAL
MUCUS: ABNORMAL HPF
NITRITE URINE, QUANTITATIVE: POSITIVE
PH, URINE: 5.5 (ref 5–8)
PROTEIN UA: 100 MG/DL
RBC URINE: 17 /HPF (ref 0–3)
SPECIFIC GRAVITY UA: 1.02 (ref 1–1.03)
SQUAMOUS EPITHELIAL: 9 /HPF
TRICHOMONAS: ABNORMAL /HPF
UROBILINOGEN, URINE: 0.2 MG/DL (ref 0.2–1)
WBC CLUMP: ABNORMAL /HPF
WBC UA: 2209 /HPF (ref 0–2)

## 2023-07-10 ENCOUNTER — HOSPITAL ENCOUNTER (OUTPATIENT)
Age: 83
Setting detail: SPECIMEN
Discharge: HOME | End: 2023-07-10
Payer: MEDICARE

## 2023-07-10 LAB
BASOPHILS ABSOLUTE: 0 K/CU MM
BASOPHILS RELATIVE PERCENT: 0.4 % (ref 0–1)
CULTURE: ABNORMAL
DIFFERENTIAL TYPE: ABNORMAL
EOSINOPHILS ABSOLUTE: 0.5 K/CU MM
EOSINOPHILS RELATIVE PERCENT: 6.6 % (ref 0–3)
HCT VFR BLD CALC: 25.2 % (ref 42–52)
HEMOGLOBIN: 7.6 GM/DL (ref 13.5–18)
IMMATURE NEUTROPHIL %: 0.3 % (ref 0–0.43)
LYMPHOCYTES ABSOLUTE: 1.8 K/CU MM
LYMPHOCYTES RELATIVE PERCENT: 24.3 % (ref 24–44)
Lab: ABNORMAL
MCH RBC QN AUTO: 30.3 PG (ref 27–31)
MCHC RBC AUTO-ENTMCNC: 30.2 % (ref 32–36)
MCV RBC AUTO: 100.4 FL (ref 78–100)
MONOCYTES ABSOLUTE: 0.7 K/CU MM
MONOCYTES RELATIVE PERCENT: 8.6 % (ref 0–4)
NUCLEATED RBC %: 0 %
PDW BLD-RTO: 18.9 % (ref 11.7–14.9)
PLATELET # BLD: 269 K/CU MM (ref 140–440)
PMV BLD AUTO: 10.7 FL (ref 7.5–11.1)
RBC # BLD: 2.51 M/CU MM (ref 4.6–6.2)
SEGMENTED NEUTROPHILS ABSOLUTE COUNT: 4.5 K/CU MM
SEGMENTED NEUTROPHILS RELATIVE PERCENT: 59.8 % (ref 36–66)
SPECIMEN: ABNORMAL
TOTAL IMMATURE NEUTOROPHIL: 0.02 K/CU MM
TOTAL NUCLEATED RBC: 0 K/CU MM
WBC # BLD: 7.6 K/CU MM (ref 4–10.5)

## 2023-07-10 PROCEDURE — 84156 ASSAY OF PROTEIN URINE: CPT

## 2023-07-10 PROCEDURE — 81050 URINALYSIS VOLUME MEASURE: CPT

## 2023-07-10 PROCEDURE — 82570 ASSAY OF URINE CREATININE: CPT

## 2023-07-10 PROCEDURE — 85025 COMPLETE CBC W/AUTO DIFF WBC: CPT

## 2023-07-10 PROCEDURE — 36415 COLL VENOUS BLD VENIPUNCTURE: CPT

## 2023-07-11 LAB
CREATININE 24 HOUR UR: 0.6 GM/24 HR (ref 0.6–2.5)
Lab: 24 HRS
PROTEIN 24 HOUR URINE: 1075 MG/24 HR
VOLUME, (UVOL): 1200 MLS

## 2023-07-12 ENCOUNTER — HOSPITAL ENCOUNTER (OUTPATIENT)
Age: 83
Setting detail: SPECIMEN
Discharge: HOME | End: 2023-07-12
Payer: MEDICARE

## 2023-07-12 LAB
ALBUMIN SERPL-MCNC: 3.3 GM/DL (ref 3.4–5)
ALP BLD-CCNC: 84 IU/L (ref 40–128)
ALT SERPL-CCNC: 5 U/L (ref 10–40)
ANION GAP SERPL CALCULATED.3IONS-SCNC: 9 MMOL/L (ref 4–16)
AST SERPL-CCNC: 10 IU/L (ref 15–37)
BILIRUB SERPL-MCNC: 0.2 MG/DL (ref 0–1)
BUN SERPL-MCNC: 34 MG/DL (ref 6–23)
CALCIUM SERPL-MCNC: 8.6 MG/DL (ref 8.3–10.6)
CHLORIDE BLD-SCNC: 108 MMOL/L (ref 99–110)
CO2: 24 MMOL/L (ref 21–32)
CREAT SERPL-MCNC: 2.1 MG/DL (ref 0.9–1.3)
GFR SERPL CREATININE-BSD FRML MDRD: 30 ML/MIN/1.73M2
GLUCOSE SERPL-MCNC: 134 MG/DL (ref 70–99)
MAGNESIUM: 2.1 MG/DL (ref 1.8–2.4)
PHOSPHORUS: 3.8 MG/DL (ref 2.5–4.9)
POTASSIUM SERPL-SCNC: 5 MMOL/L (ref 3.5–5.1)
SODIUM BLD-SCNC: 141 MMOL/L (ref 135–145)
TOTAL PROTEIN: 6.3 GM/DL (ref 6.4–8.2)

## 2023-07-12 PROCEDURE — 36415 COLL VENOUS BLD VENIPUNCTURE: CPT

## 2023-07-12 PROCEDURE — 84100 ASSAY OF PHOSPHORUS: CPT

## 2023-07-12 PROCEDURE — 83735 ASSAY OF MAGNESIUM: CPT

## 2023-07-12 PROCEDURE — 80053 COMPREHEN METABOLIC PANEL: CPT

## 2023-08-06 ENCOUNTER — HOSPITAL ENCOUNTER (EMERGENCY)
Age: 83
Discharge: HOME | End: 2023-08-06
Attending: EMERGENCY MEDICINE
Payer: MEDICARE

## 2023-08-06 ENCOUNTER — APPOINTMENT (OUTPATIENT)
Dept: CT IMAGING | Age: 83
End: 2023-08-06
Payer: MEDICARE

## 2023-08-06 VITALS
HEIGHT: 68 IN | BODY MASS INDEX: 29.12 KG/M2 | WEIGHT: 192.13 LBS | HEART RATE: 64 BPM | OXYGEN SATURATION: 100 % | SYSTOLIC BLOOD PRESSURE: 141 MMHG | DIASTOLIC BLOOD PRESSURE: 67 MMHG | TEMPERATURE: 98.1 F | RESPIRATION RATE: 13 BRPM

## 2023-08-06 DIAGNOSIS — K62.89 PROCTITIS: Primary | ICD-10-CM

## 2023-08-06 DIAGNOSIS — N39.0 URINARY TRACT INFECTION WITH HEMATURIA, SITE UNSPECIFIED: ICD-10-CM

## 2023-08-06 DIAGNOSIS — K59.00 CONSTIPATION, UNSPECIFIED CONSTIPATION TYPE: ICD-10-CM

## 2023-08-06 DIAGNOSIS — R10.9 ABDOMINAL PAIN, UNSPECIFIED ABDOMINAL LOCATION: ICD-10-CM

## 2023-08-06 DIAGNOSIS — R31.9 URINARY TRACT INFECTION WITH HEMATURIA, SITE UNSPECIFIED: ICD-10-CM

## 2023-08-06 LAB
ALBUMIN SERPL-MCNC: 2.7 GM/DL (ref 3.4–5)
ALP BLD-CCNC: 78 IU/L (ref 40–129)
ALT SERPL-CCNC: 7 U/L (ref 10–40)
ANION GAP SERPL CALCULATED.3IONS-SCNC: 10 MMOL/L (ref 4–16)
AST SERPL-CCNC: 20 IU/L (ref 15–37)
BACTERIA: NEGATIVE /HPF
BASOPHILS ABSOLUTE: 0.1 K/CU MM
BASOPHILS RELATIVE PERCENT: 0.8 % (ref 0–1)
BILIRUB SERPL-MCNC: 0.1 MG/DL (ref 0–1)
BILIRUBIN URINE: NEGATIVE MG/DL
BLOOD, URINE: ABNORMAL
BUN SERPL-MCNC: 24 MG/DL (ref 6–23)
CALCIUM SERPL-MCNC: 6.7 MG/DL (ref 8.3–10.6)
CHLORIDE BLD-SCNC: 111 MMOL/L (ref 99–110)
CLARITY: ABNORMAL
CO2: 17 MMOL/L (ref 21–32)
COLOR: YELLOW
CREAT SERPL-MCNC: 1.8 MG/DL (ref 0.9–1.3)
DIFFERENTIAL TYPE: ABNORMAL
EOSINOPHILS ABSOLUTE: 0.5 K/CU MM
EOSINOPHILS RELATIVE PERCENT: 6.7 % (ref 0–3)
GFR SERPL CREATININE-BSD FRML MDRD: 37 ML/MIN/1.73M2
GLUCOSE SERPL-MCNC: 64 MG/DL (ref 70–99)
GLUCOSE, URINE: NEGATIVE MG/DL
HCT VFR BLD CALC: 27.7 % (ref 42–52)
HEMOGLOBIN: 8.3 GM/DL (ref 13.5–18)
IMMATURE NEUTROPHIL %: 0.1 % (ref 0–0.43)
KETONES, URINE: NEGATIVE MG/DL
LACTATE: 1.2 MMOL/L (ref 0.5–1.9)
LEUKOCYTE ESTERASE, URINE: ABNORMAL
LIPASE: 25 IU/L (ref 13–60)
LYMPHOCYTES ABSOLUTE: 1.7 K/CU MM
LYMPHOCYTES RELATIVE PERCENT: 21.2 % (ref 24–44)
MCH RBC QN AUTO: 30.6 PG (ref 27–31)
MCHC RBC AUTO-ENTMCNC: 30 % (ref 32–36)
MCV RBC AUTO: 102.2 FL (ref 78–100)
MONOCYTES ABSOLUTE: 0.6 K/CU MM
MONOCYTES RELATIVE PERCENT: 8 % (ref 0–4)
NITRITE URINE, QUANTITATIVE: POSITIVE
NUCLEATED RBC %: 0 %
PDW BLD-RTO: 16.3 % (ref 11.7–14.9)
PH, URINE: 6 (ref 5–8)
PLATELET # BLD: 243 K/CU MM (ref 140–440)
PMV BLD AUTO: 11.2 FL (ref 7.5–11.1)
POTASSIUM SERPL-SCNC: 4.2 MMOL/L (ref 3.5–5.1)
PROTEIN UA: 100 MG/DL
RBC # BLD: 2.71 M/CU MM (ref 4.6–6.2)
RBC URINE: 0 /HPF (ref 0–3)
SEGMENTED NEUTROPHILS ABSOLUTE COUNT: 5 K/CU MM
SEGMENTED NEUTROPHILS RELATIVE PERCENT: 63.2 % (ref 36–66)
SODIUM BLD-SCNC: 138 MMOL/L (ref 135–145)
SPECIFIC GRAVITY UA: 1.02 (ref 1–1.03)
TOTAL IMMATURE NEUTOROPHIL: 0.01 K/CU MM
TOTAL NUCLEATED RBC: 0 K/CU MM
TOTAL PROTEIN: 5.7 GM/DL (ref 6.4–8.2)
TRICHOMONAS: ABNORMAL /HPF
UROBILINOGEN, URINE: 0.2 MG/DL (ref 0.2–1)
WBC # BLD: 8 K/CU MM (ref 4–10.5)
WBC UA: 2070 /HPF (ref 0–2)

## 2023-08-06 PROCEDURE — 74176 CT ABD & PELVIS W/O CONTRAST: CPT

## 2023-08-06 PROCEDURE — 6360000002 HC RX W HCPCS: Performed by: NURSE PRACTITIONER

## 2023-08-06 PROCEDURE — 2500000003 HC RX 250 WO HCPCS: Performed by: NURSE PRACTITIONER

## 2023-08-06 PROCEDURE — 6360000002 HC RX W HCPCS: Performed by: EMERGENCY MEDICINE

## 2023-08-06 PROCEDURE — 87077 CULTURE AEROBIC IDENTIFY: CPT

## 2023-08-06 PROCEDURE — 99284 EMERGENCY DEPT VISIT MOD MDM: CPT

## 2023-08-06 PROCEDURE — 2580000003 HC RX 258: Performed by: NURSE PRACTITIONER

## 2023-08-06 PROCEDURE — 80053 COMPREHEN METABOLIC PANEL: CPT

## 2023-08-06 PROCEDURE — 96367 TX/PROPH/DG ADDL SEQ IV INF: CPT

## 2023-08-06 PROCEDURE — 81001 URINALYSIS AUTO W/SCOPE: CPT

## 2023-08-06 PROCEDURE — 83605 ASSAY OF LACTIC ACID: CPT

## 2023-08-06 PROCEDURE — 85025 COMPLETE CBC W/AUTO DIFF WBC: CPT

## 2023-08-06 PROCEDURE — 96366 THER/PROPH/DIAG IV INF ADDON: CPT

## 2023-08-06 PROCEDURE — 96372 THER/PROPH/DIAG INJ SC/IM: CPT

## 2023-08-06 PROCEDURE — 6370000000 HC RX 637 (ALT 250 FOR IP): Performed by: EMERGENCY MEDICINE

## 2023-08-06 PROCEDURE — 87086 URINE CULTURE/COLONY COUNT: CPT

## 2023-08-06 PROCEDURE — 96365 THER/PROPH/DIAG IV INF INIT: CPT

## 2023-08-06 PROCEDURE — 83690 ASSAY OF LIPASE: CPT

## 2023-08-06 RX ORDER — POLYETHYLENE GLYCOL 3350 17 G/17G
17 POWDER, FOR SOLUTION ORAL DAILY
Status: DISCONTINUED | OUTPATIENT
Start: 2023-08-06 | End: 2023-08-07 | Stop reason: HOSPADM

## 2023-08-06 RX ORDER — ONDANSETRON 2 MG/ML
4 INJECTION INTRAMUSCULAR; INTRAVENOUS EVERY 30 MIN PRN
Status: DISCONTINUED | OUTPATIENT
Start: 2023-08-06 | End: 2023-08-07 | Stop reason: HOSPADM

## 2023-08-06 RX ORDER — CIPROFLOXACIN 250 MG/1
250 TABLET, FILM COATED ORAL 2 TIMES DAILY
Qty: 20 TABLET | Refills: 0 | Status: SHIPPED | OUTPATIENT
Start: 2023-08-06 | End: 2023-08-16

## 2023-08-06 RX ORDER — DOCUSATE SODIUM 100 MG/1
100 CAPSULE, LIQUID FILLED ORAL 2 TIMES DAILY
Qty: 60 CAPSULE | Refills: 0 | Status: SHIPPED | OUTPATIENT
Start: 2023-08-06 | End: 2023-09-05

## 2023-08-06 RX ORDER — DICYCLOMINE HYDROCHLORIDE 10 MG/ML
20 INJECTION INTRAMUSCULAR ONCE
Status: COMPLETED | OUTPATIENT
Start: 2023-08-06 | End: 2023-08-06

## 2023-08-06 RX ORDER — CALCIUM GLUCONATE 20 MG/ML
1000 INJECTION, SOLUTION INTRAVENOUS ONCE
Status: COMPLETED | OUTPATIENT
Start: 2023-08-06 | End: 2023-08-06

## 2023-08-06 RX ORDER — CIPROFLOXACIN 500 MG/1
250 TABLET, FILM COATED ORAL 2 TIMES DAILY
Qty: 10 TABLET | Refills: 0 | Status: SHIPPED | OUTPATIENT
Start: 2023-08-06 | End: 2023-08-16

## 2023-08-06 RX ORDER — POLYETHYLENE GLYCOL 3350 17 G/17G
17 POWDER, FOR SOLUTION ORAL DAILY PRN
Qty: 30 PACKET | Refills: 0 | Status: SHIPPED | OUTPATIENT
Start: 2023-08-06 | End: 2023-09-05

## 2023-08-06 RX ORDER — DEXTROSE MONOHYDRATE 25 G/50ML
25 INJECTION, SOLUTION INTRAVENOUS ONCE
Status: DISCONTINUED | OUTPATIENT
Start: 2023-08-06 | End: 2023-08-06

## 2023-08-06 RX ORDER — BISACODYL 10 MG
10 SUPPOSITORY, RECTAL RECTAL ONCE
Status: COMPLETED | OUTPATIENT
Start: 2023-08-06 | End: 2023-08-06

## 2023-08-06 RX ORDER — MORPHINE SULFATE 4 MG/ML
4 INJECTION, SOLUTION INTRAMUSCULAR; INTRAVENOUS EVERY 30 MIN PRN
Status: DISCONTINUED | OUTPATIENT
Start: 2023-08-06 | End: 2023-08-07 | Stop reason: HOSPADM

## 2023-08-06 RX ORDER — CEPHALEXIN 500 MG/1
500 CAPSULE ORAL 4 TIMES DAILY
Qty: 40 CAPSULE | Refills: 0 | Status: SHIPPED | OUTPATIENT
Start: 2023-08-06 | End: 2023-08-16

## 2023-08-06 RX ORDER — DOCUSATE SODIUM 100 MG/1
100 CAPSULE, LIQUID FILLED ORAL 2 TIMES DAILY
Qty: 20 CAPSULE | Refills: 0 | Status: SHIPPED | OUTPATIENT
Start: 2023-08-06 | End: 2023-08-16

## 2023-08-06 RX ADMIN — CALCIUM GLUCONATE 1000 MG: 20 INJECTION, SOLUTION INTRAVENOUS at 20:08

## 2023-08-06 RX ADMIN — BISACODYL 10 MG: 10 SUPPOSITORY RECTAL at 16:17

## 2023-08-06 RX ADMIN — POLYETHYLENE GLYCOL 3350 17 G: 17 POWDER, FOR SOLUTION ORAL at 16:22

## 2023-08-06 RX ADMIN — CEFTRIAXONE SODIUM 1000 MG: 1 INJECTION, POWDER, FOR SOLUTION INTRAMUSCULAR; INTRAVENOUS at 19:45

## 2023-08-06 RX ADMIN — DICYCLOMINE HYDROCHLORIDE 20 MG: 10 INJECTION, SOLUTION INTRAMUSCULAR at 16:17

## 2023-08-06 ASSESSMENT — ENCOUNTER SYMPTOMS
COLOR CHANGE: 0
VOMITING: 0
SHORTNESS OF BREATH: 0
CONSTIPATION: 1
ABDOMINAL PAIN: 1
NAUSEA: 0
CHEST TIGHTNESS: 0
RECTAL PAIN: 1
COUGH: 0

## 2023-08-06 ASSESSMENT — PAIN DESCRIPTION - PAIN TYPE: TYPE: ACUTE PAIN

## 2023-08-06 ASSESSMENT — PAIN DESCRIPTION - DESCRIPTORS: DESCRIPTORS: THROBBING;PRESSURE

## 2023-08-06 ASSESSMENT — PAIN DESCRIPTION - FREQUENCY: FREQUENCY: CONTINUOUS

## 2023-08-06 ASSESSMENT — PAIN SCALES - GENERAL: PAINLEVEL_OUTOF10: 7

## 2023-08-06 ASSESSMENT — PAIN DESCRIPTION - ORIENTATION: ORIENTATION: LOWER;MID

## 2023-08-06 ASSESSMENT — PAIN DESCRIPTION - LOCATION: LOCATION: ABDOMEN

## 2023-08-06 NOTE — ED PROVIDER NOTES
BRAXTON PEACE am the primary physician of record, and independently examined and evaluated Chapin Betancourt. In brief their history revealed a 68-year-old male presents with complaint of abdominal pain constipation nausea. Has had constipation for the last couple days abdominal pain. Tried some MiraLAX without relief. No fevers no vomiting no chest pain shortness of breath has a chronic indwelling Hopper catheter denies any complaints there just nausea constipation abdominal pain. He states this has happened before he does not remember what happened last time. .    Their focused exam revealed alert oriented male resting in bed no distress watching television ANO x3 cranial nerves grossly intact lungs are clear heart regular rate rhythm abdomen soft obese tender to palpation diffusely no rebound or guarding rigidity bowel sounds increased. No pulsatile mass no hernia. .    ED course: Patient seen with NP please see her note. Patient here with abdominal pain constipation nausea for the last couple days. He states this has happened before he tried MiraLAX and relief. He does not remember what happened last time. He has a chronic indwelling Hopper catheter denies any complaints they are just nausea constipation abdominal pain has not passed flatus or bowel movements in 2 days. He appears otherwise well he does have generalized abdominal pain abdomen soft he is obese. No distress he is watching the baseball game. We will check labs, imaging we will give him pain nausea medicine and MiraLAX Dulcolax. Main concern is for constipation impaction obstruction otherwise he appears well. Patient rechecked doing well. Imaging shows proctitis. No constipation or obstruction or perforation or impaction. Does have a chronic UTI and appears given Rocephin here given Cipro for home given outpatient referral and follow-up information. Otherwise work-up negative patient stable discharged okay with plan.
history of Acute kidney failure ([address]), Acute urinary tract infection (03/15/2012), Anemia, Ataxia (01/01/2010), Ataxia, Bacteremia, Candidiasis, Chronic combined systolic and diastolic congestive heart failure ([address]), Chronic kidney disease, Cognitive communication deficit, Diabetes mellitus ([address]) (01/01/2011), Extended spectrum beta lactamase (ESBL) resistance, Fusion of spine of cervical region (10/01/2012), Gait disturbance (01/01/2011), History of prostate cancer (01/01/1996), History of tobacco use (01/01/1959), Hydronephrosis, Hyperkalemia, Hyperlipidemia (01/01/2011), Hypertension, Hypertensive heart disease with CHF (congestive heart failure) ([address]), Hypotension, Immunodeficiency ([address]), Metabolic encephalopathy, Muscle weakness, Osteoarthritis (01/01/2011), Osteoarthritis, Secondary Hyperaldosteronism ([address]), Supraventricular tachycardia ([address]), and Tubulo-interstitial nephritis. Disposition Considerations (tests considered but not done, Shared Decision Making, Pt Expectation of Test or Tx.):   Admission Considered. Shared decision making employed   Appropriate for outpatient management      FINAL IMPRESSION      1. Abdominal pain, unspecified abdominal location    2. Constipation, unspecified constipation type    3. Proctitis    4. Urinary tract infection with hematuria, site unspecified          DISPOSITION/PLAN   DISPOSITION Decision To Discharge 08/06/2023 07:10:23 PM      PATIENT REFERRED TO:  No follow-up provider specified.     DISCHARGE MEDICATIONS:  New Prescriptions    CIPROFLOXACIN (CIPRO) 250 MG TABLET    Take 1 tablet by mouth 2 times daily for 10 days    DOCUSATE SODIUM (COLACE) 100 MG CAPSULE    Take 1 capsule by mouth 2 times daily    POLYETHYLENE GLYCOL (MIRALAX) 17 G PACKET    Take 1 packet by mouth daily as needed for Other (Constipation)        (Please note that portions of this note were completed with a voice recognition program.  Efforts were made to edit the dictations but occasionally

## 2023-08-07 NOTE — ED NOTES
Small BM noted in pts brief. Changed pt into clean brief and cream applied.       Honorio Lopez RN  08/06/23 2457

## 2023-08-08 LAB
CULTURE: NORMAL
Lab: NORMAL
SPECIMEN: NORMAL

## 2023-10-23 ENCOUNTER — APPOINTMENT (OUTPATIENT)
Dept: CT IMAGING | Age: 83
End: 2023-10-23
Payer: MEDICARE

## 2023-10-23 ENCOUNTER — HOSPITAL ENCOUNTER (INPATIENT)
Age: 83
LOS: 5 days | Discharge: NURSING, REHAB OR LONG-TERM CARE | End: 2023-10-28
Attending: STUDENT IN AN ORGANIZED HEALTH CARE EDUCATION/TRAINING PROGRAM
Payer: MEDICARE

## 2023-10-23 DIAGNOSIS — N39.0 URINARY TRACT INFECTION ASSOCIATED WITH CATHETERIZATION OF URINARY TRACT, UNSPECIFIED INDWELLING URINARY CATHETER TYPE, INITIAL ENCOUNTER (HCC): Primary | ICD-10-CM

## 2023-10-23 DIAGNOSIS — T83.511A URINARY TRACT INFECTION ASSOCIATED WITH CATHETERIZATION OF URINARY TRACT, UNSPECIFIED INDWELLING URINARY CATHETER TYPE, INITIAL ENCOUNTER (HCC): Primary | ICD-10-CM

## 2023-10-23 LAB
ALBUMIN SERPL-MCNC: 3.9 GM/DL (ref 3.4–5)
ALP BLD-CCNC: 113 IU/L (ref 40–129)
ALT SERPL-CCNC: 7 U/L (ref 10–40)
ANION GAP SERPL CALCULATED.3IONS-SCNC: 15 MMOL/L (ref 4–16)
ANION GAP SERPL CALCULATED.3IONS-SCNC: 16 MMOL/L (ref 4–16)
AST SERPL-CCNC: 20 IU/L (ref 15–37)
BACTERIA: NEGATIVE /HPF
BASOPHILS ABSOLUTE: 0 K/CU MM
BASOPHILS RELATIVE PERCENT: 0.2 % (ref 0–1)
BILIRUB SERPL-MCNC: 0.4 MG/DL (ref 0–1)
BILIRUBIN URINE: NEGATIVE MG/DL
BLOOD, URINE: ABNORMAL
BUN SERPL-MCNC: 42 MG/DL (ref 6–23)
BUN SERPL-MCNC: 44 MG/DL (ref 6–23)
CALCIUM SERPL-MCNC: 8.8 MG/DL (ref 8.3–10.6)
CALCIUM SERPL-MCNC: 9.5 MG/DL (ref 8.3–10.6)
CHLORIDE BLD-SCNC: 100 MMOL/L (ref 99–110)
CHLORIDE BLD-SCNC: 103 MMOL/L (ref 99–110)
CLARITY: CLEAR
CO2: 18 MMOL/L (ref 21–32)
CO2: 19 MMOL/L (ref 21–32)
COLOR: YELLOW
CREAT SERPL-MCNC: 2.4 MG/DL (ref 0.9–1.3)
CREAT SERPL-MCNC: 2.5 MG/DL (ref 0.9–1.3)
DIFFERENTIAL TYPE: ABNORMAL
EOSINOPHILS ABSOLUTE: 0.1 K/CU MM
EOSINOPHILS RELATIVE PERCENT: 1.9 % (ref 0–3)
GFR SERPL CREATININE-BSD FRML MDRD: 25 ML/MIN/1.73M2
GFR SERPL CREATININE-BSD FRML MDRD: 26 ML/MIN/1.73M2
GLUCOSE SERPL-MCNC: 173 MG/DL (ref 70–99)
GLUCOSE SERPL-MCNC: 201 MG/DL (ref 70–99)
GLUCOSE, URINE: NEGATIVE MG/DL
HCT VFR BLD CALC: 34.8 % (ref 42–52)
HEMOGLOBIN: 10.4 GM/DL (ref 13.5–18)
IMMATURE NEUTROPHIL %: 0.2 % (ref 0–0.43)
KETONES, URINE: NEGATIVE MG/DL
LACTATE: 2.4 MMOL/L (ref 0.5–1.9)
LEUKOCYTE ESTERASE, URINE: ABNORMAL
LYMPHOCYTES ABSOLUTE: 0.2 K/CU MM
LYMPHOCYTES RELATIVE PERCENT: 3.7 % (ref 24–44)
MCH RBC QN AUTO: 30.5 PG (ref 27–31)
MCHC RBC AUTO-ENTMCNC: 29.9 % (ref 32–36)
MCV RBC AUTO: 102.1 FL (ref 78–100)
MONOCYTES ABSOLUTE: 0 K/CU MM
MONOCYTES RELATIVE PERCENT: 0.5 % (ref 0–4)
MUCUS: ABNORMAL HPF
NITRITE URINE, QUANTITATIVE: POSITIVE
NUCLEATED RBC %: 0 %
PDW BLD-RTO: 14.4 % (ref 11.7–14.9)
PH, URINE: 7.5 (ref 5–8)
PLATELET # BLD: 237 K/CU MM (ref 140–440)
PMV BLD AUTO: 11.8 FL (ref 7.5–11.1)
POTASSIUM SERPL-SCNC: 4.8 MMOL/L (ref 3.5–5.1)
POTASSIUM SERPL-SCNC: 6 MMOL/L (ref 3.5–5.1)
PRO-BNP: 1174 PG/ML
PROTEIN UA: 100 MG/DL
RBC # BLD: 3.41 M/CU MM (ref 4.6–6.2)
RBC URINE: 48 /HPF (ref 0–3)
SEGMENTED NEUTROPHILS ABSOLUTE COUNT: 5.5 K/CU MM
SEGMENTED NEUTROPHILS RELATIVE PERCENT: 93.5 % (ref 36–66)
SODIUM BLD-SCNC: 134 MMOL/L (ref 135–145)
SODIUM BLD-SCNC: 137 MMOL/L (ref 135–145)
SPECIFIC GRAVITY UA: 1.01 (ref 1–1.03)
TOTAL IMMATURE NEUTOROPHIL: 0.01 K/CU MM
TOTAL NUCLEATED RBC: 0 K/CU MM
TOTAL PROTEIN: 8.6 GM/DL (ref 6.4–8.2)
TOTAL RETICULOCYTE COUNT: 0.05 K/CU MM
TRICHOMONAS: ABNORMAL /HPF
UROBILINOGEN, URINE: 0.2 MG/DL (ref 0.2–1)
WBC # BLD: 5.9 K/CU MM (ref 4–10.5)
WBC UA: 841 /HPF (ref 0–2)

## 2023-10-23 PROCEDURE — 2140000000 HC CCU INTERMEDIATE R&B

## 2023-10-23 PROCEDURE — 80048 BASIC METABOLIC PNL TOTAL CA: CPT

## 2023-10-23 PROCEDURE — 80053 COMPREHEN METABOLIC PANEL: CPT

## 2023-10-23 PROCEDURE — 96361 HYDRATE IV INFUSION ADD-ON: CPT

## 2023-10-23 PROCEDURE — 99285 EMERGENCY DEPT VISIT HI MDM: CPT

## 2023-10-23 PROCEDURE — 87086 URINE CULTURE/COLONY COUNT: CPT

## 2023-10-23 PROCEDURE — 87186 SC STD MICRODIL/AGAR DIL: CPT

## 2023-10-23 PROCEDURE — 83605 ASSAY OF LACTIC ACID: CPT

## 2023-10-23 PROCEDURE — 85025 COMPLETE CBC W/AUTO DIFF WBC: CPT

## 2023-10-23 PROCEDURE — 84145 PROCALCITONIN (PCT): CPT

## 2023-10-23 PROCEDURE — 87077 CULTURE AEROBIC IDENTIFY: CPT

## 2023-10-23 PROCEDURE — 87040 BLOOD CULTURE FOR BACTERIA: CPT

## 2023-10-23 PROCEDURE — 74176 CT ABD & PELVIS W/O CONTRAST: CPT

## 2023-10-23 PROCEDURE — 2580000003 HC RX 258: Performed by: PHYSICIAN ASSISTANT

## 2023-10-23 PROCEDURE — 81001 URINALYSIS AUTO W/SCOPE: CPT

## 2023-10-23 PROCEDURE — 96365 THER/PROPH/DIAG IV INF INIT: CPT

## 2023-10-23 PROCEDURE — 83880 ASSAY OF NATRIURETIC PEPTIDE: CPT

## 2023-10-23 PROCEDURE — 6360000002 HC RX W HCPCS: Performed by: PHYSICIAN ASSISTANT

## 2023-10-23 RX ORDER — 0.9 % SODIUM CHLORIDE 0.9 %
1000 INTRAVENOUS SOLUTION INTRAVENOUS ONCE
Status: COMPLETED | OUTPATIENT
Start: 2023-10-23 | End: 2023-10-23

## 2023-10-23 RX ORDER — SODIUM CHLORIDE, SODIUM LACTATE, POTASSIUM CHLORIDE, AND CALCIUM CHLORIDE .6; .31; .03; .02 G/100ML; G/100ML; G/100ML; G/100ML
1000 INJECTION, SOLUTION INTRAVENOUS ONCE
Status: COMPLETED | OUTPATIENT
Start: 2023-10-23 | End: 2023-10-24

## 2023-10-23 RX ADMIN — CEFTRIAXONE 1000 MG: 1 INJECTION, POWDER, FOR SOLUTION INTRAMUSCULAR; INTRAVENOUS at 18:47

## 2023-10-23 RX ADMIN — SODIUM CHLORIDE 1000 ML: 9 INJECTION, SOLUTION INTRAVENOUS at 18:46

## 2023-10-23 ASSESSMENT — PAIN SCALES - GENERAL: PAINLEVEL_OUTOF10: 8

## 2023-10-23 ASSESSMENT — PAIN DESCRIPTION - LOCATION: LOCATION: FLANK

## 2023-10-23 ASSESSMENT — LIFESTYLE VARIABLES
HOW MANY STANDARD DRINKS CONTAINING ALCOHOL DO YOU HAVE ON A TYPICAL DAY: PATIENT DOES NOT DRINK
HOW OFTEN DO YOU HAVE A DRINK CONTAINING ALCOHOL: NEVER

## 2023-10-23 ASSESSMENT — PAIN DESCRIPTION - ORIENTATION: ORIENTATION: LEFT

## 2023-10-23 ASSESSMENT — PAIN - FUNCTIONAL ASSESSMENT: PAIN_FUNCTIONAL_ASSESSMENT: 0-10

## 2023-10-23 NOTE — ED NOTES
Sepsis alert called for pt. Dr. Derrell Tripp  and [facility] PA Informed.    Hugo Mckinley RN  10/23/23 1800       Hugo Mckinley RN  10/23/23 8617       Hugo Mckinley RN  10/23/23 3827

## 2023-10-23 NOTE — ED TRIAGE NOTES
Patient to triage via EMS with c/o dysuria and pain to left flank area. Patient has chronic indwelling catheter. Denies fever. Resps even and unlabored.

## 2023-10-23 NOTE — ED PROVIDER NOTES
EMERGENCY DEPARTMENT ENCOUNTER        Pt Name: Dre Israel  MRN: [id number]  [address] 1940  Date of evaluation: 10/23/2023  Provider: Nadeem Funk PA-C  PCP: Gilberto Billy MD    NENA. I have evaluated this patient. Triage CHIEF COMPLAINT       Chief Complaint   Patient presents with    Dysuria         HISTORY OF PRESENT ILLNESS      Chief Complaint: Concern for UTI    Dre Israel is a 78 y.o. male who presents with concern for a UTI. Patient--and son--are both poor historians. Son states patient gets UTIs frequently. He has a suprapubic catheter. Patient states this was exchanged by Urology 4 days ago. He states they sent a urine specimen at that time but was not started on any ABX. He states he is having normal urine output. He does have left sided flank pain. Denies fever. Denies cp, sob, n/v/d. Nursing Notes were all reviewed and agreed with or any disagreements were addressed in the HPI. REVIEW OF SYSTEMS     CONSTITUTIONAL:  Denies fever. EYES:  Denies visual changes. HEAD:  Denies headache. ENT:  Denies earache, nasal congestion, sore throat. NECK:  Denies neck pain. RESPIRATORY:  Denies any shortness of breath. CARDIOVASCULAR:  Denies chest pain. GI:  Denies nausea or vomiting. :  + flank pain. MUSCULOSKELETAL:  Denies extremity pain or swelling. BACK:  Denies back pain. INTEGUMENT:  Denies skin changes. LYMPHATIC:  Denies lymphadenopathy. NEUROLOGIC:  Denies any numbness/tingling. PSYCHIATRIC:  Denies SI/HI.     PAST MEDICAL HISTORY     Past Medical History:   Diagnosis Date    Acute kidney failure ([address] )     Acute urinary tract infection 03/15/2012    Anemia     Ataxia 01/01/2010    Ataxia     Bacteremia     Candidiasis     Chronic combined systolic and diastolic congestive heart failure (HCC)     Chronic kidney disease     Cognitive communication deficit     Diabetes mellitus ([address]) 01/01/2011    type 2, controlled    Extended spectrum beta limits   URINE MICROSCOPIC WITH REFLEX TO CULTURE - Abnormal; Notable for the following components:    RBC, UA 48 (*)     WBC,  (*)     Mucus, UA RARE (*)     All other components within normal limits   CBC WITH AUTO DIFFERENTIAL - Abnormal; Notable for the following components:    RBC 3.41 (*)     Hemoglobin 10.4 (*)     Hematocrit 34.8 (*)     .1 (*)     MCHC 29.9 (*)     MPV 11.8 (*)     Segs Relative 93.5 (*)     Lymphocytes % 3.7 (*)     All other components within normal limits   COMPREHENSIVE METABOLIC PANEL - Abnormal; Notable for the following components:    Sodium 134 (*)     Potassium 6.0 (*)     CO2 18 (*)     Glucose 201 (*)     BUN 44 (*)     Creatinine 2.5 (*)     Est, Glom Filt Rate 25 (*)     Total Protein 8.6 (*)     ALT 7 (*)     All other components within normal limits   BRAIN NATRIURETIC PEPTIDE - Abnormal; Notable for the following components:    Pro-BNP 1,174 (*)     All other components within normal limits   LACTIC ACID - Abnormal; Notable for the following components:    Lactate 2.4 (*)     All other components within normal limits   BASIC METABOLIC PANEL - Abnormal; Notable for the following components:    CO2 19 (*)     Glucose 173 (*)     BUN 42 (*)     Creatinine 2.4 (*)     Est, Glom Filt Rate 26 (*)     All other components within normal limits   POCT GLUCOSE - Abnormal; Notable for the following components:    POC Glucose 218 (*)     All other components within normal limits   CULTURE, URINE   CULTURE, BLOOD 1   CULTURE, BLOOD 1   PROCALCITONIN   LACTATE, SEPSIS   BASIC METABOLIC PANEL W/ REFLEX TO MG FOR LOW K       When ordered, only abnormal lab results are displayed. All other labs were within normal range or not returned as of this dictation. EKG: When ordered, EKG's are interpreted by the Emergency Department Physician in the absence of a cardiologist.  Please see their note for interpretation of EKG.     RADIOLOGY:   Non-plain film images such as CT,

## 2023-10-24 LAB
ANION GAP SERPL CALCULATED.3IONS-SCNC: 13 MMOL/L (ref 4–16)
BUN SERPL-MCNC: 42 MG/DL (ref 6–23)
CALCIUM SERPL-MCNC: 8.4 MG/DL (ref 8.3–10.6)
CHLORIDE BLD-SCNC: 103 MMOL/L (ref 99–110)
CO2: 21 MMOL/L (ref 21–32)
CREAT SERPL-MCNC: 2.5 MG/DL (ref 0.9–1.3)
GFR SERPL CREATININE-BSD FRML MDRD: 25 ML/MIN/1.73M2
GLUCOSE BLD-MCNC: 135 MG/DL (ref 70–99)
GLUCOSE BLD-MCNC: 157 MG/DL (ref 70–99)
GLUCOSE BLD-MCNC: 218 MG/DL (ref 70–99)
GLUCOSE BLD-MCNC: 242 MG/DL (ref 70–99)
GLUCOSE SERPL-MCNC: 327 MG/DL (ref 70–99)
POTASSIUM SERPL-SCNC: 4.6 MMOL/L (ref 3.5–5.1)
PROCALCITONIN SERPL-MCNC: 0.29 NG/ML
SODIUM BLD-SCNC: 137 MMOL/L (ref 135–145)

## 2023-10-24 PROCEDURE — 6360000002 HC RX W HCPCS: Performed by: STUDENT IN AN ORGANIZED HEALTH CARE EDUCATION/TRAINING PROGRAM

## 2023-10-24 PROCEDURE — 6370000000 HC RX 637 (ALT 250 FOR IP): Performed by: INTERNAL MEDICINE

## 2023-10-24 PROCEDURE — 2140000000 HC CCU INTERMEDIATE R&B

## 2023-10-24 PROCEDURE — 36415 COLL VENOUS BLD VENIPUNCTURE: CPT

## 2023-10-24 PROCEDURE — 97162 PT EVAL MOD COMPLEX 30 MIN: CPT

## 2023-10-24 PROCEDURE — 6370000000 HC RX 637 (ALT 250 FOR IP): Performed by: STUDENT IN AN ORGANIZED HEALTH CARE EDUCATION/TRAINING PROGRAM

## 2023-10-24 PROCEDURE — 82962 GLUCOSE BLOOD TEST: CPT

## 2023-10-24 PROCEDURE — 2580000003 HC RX 258: Performed by: STUDENT IN AN ORGANIZED HEALTH CARE EDUCATION/TRAINING PROGRAM

## 2023-10-24 PROCEDURE — 97535 SELF CARE MNGMENT TRAINING: CPT

## 2023-10-24 PROCEDURE — 97166 OT EVAL MOD COMPLEX 45 MIN: CPT

## 2023-10-24 PROCEDURE — 97530 THERAPEUTIC ACTIVITIES: CPT

## 2023-10-24 PROCEDURE — 94761 N-INVAS EAR/PLS OXIMETRY MLT: CPT

## 2023-10-24 PROCEDURE — 80048 BASIC METABOLIC PNL TOTAL CA: CPT

## 2023-10-24 RX ORDER — LACTULOSE 10 G/15ML
10 SOLUTION ORAL ONCE
Status: COMPLETED | OUTPATIENT
Start: 2023-10-24 | End: 2023-10-24

## 2023-10-24 RX ORDER — LEVOTHYROXINE SODIUM 0.07 MG/1
75 TABLET ORAL DAILY
Status: DISCONTINUED | OUTPATIENT
Start: 2023-10-24 | End: 2023-10-28 | Stop reason: HOSPADM

## 2023-10-24 RX ORDER — HEPARIN SODIUM 5000 [USP'U]/ML
5000 INJECTION, SOLUTION INTRAVENOUS; SUBCUTANEOUS EVERY 8 HOURS SCHEDULED
Status: DISCONTINUED | OUTPATIENT
Start: 2023-10-24 | End: 2023-10-28 | Stop reason: HOSPADM

## 2023-10-24 RX ORDER — INSULIN LISPRO 100 [IU]/ML
0-4 INJECTION, SOLUTION INTRAVENOUS; SUBCUTANEOUS
Status: DISCONTINUED | OUTPATIENT
Start: 2023-10-24 | End: 2023-10-28 | Stop reason: HOSPADM

## 2023-10-24 RX ORDER — SODIUM CHLORIDE 0.9 % (FLUSH) 0.9 %
5-40 SYRINGE (ML) INJECTION PRN
Status: DISCONTINUED | OUTPATIENT
Start: 2023-10-24 | End: 2023-10-28 | Stop reason: HOSPADM

## 2023-10-24 RX ORDER — SENNA AND DOCUSATE SODIUM 50; 8.6 MG/1; MG/1
2 TABLET, FILM COATED ORAL 2 TIMES DAILY
Status: DISCONTINUED | OUTPATIENT
Start: 2023-10-24 | End: 2023-10-26

## 2023-10-24 RX ORDER — ACETAMINOPHEN 325 MG/1
650 TABLET ORAL EVERY 6 HOURS PRN
Status: DISCONTINUED | OUTPATIENT
Start: 2023-10-24 | End: 2023-10-28 | Stop reason: HOSPADM

## 2023-10-24 RX ORDER — INSULIN GLARGINE 100 [IU]/ML
10 INJECTION, SOLUTION SUBCUTANEOUS EVERY EVENING
Status: DISCONTINUED | OUTPATIENT
Start: 2023-10-24 | End: 2023-10-27

## 2023-10-24 RX ORDER — ASPIRIN 81 MG/1
81 TABLET, CHEWABLE ORAL DAILY
Status: DISCONTINUED | OUTPATIENT
Start: 2023-10-24 | End: 2023-10-28 | Stop reason: HOSPADM

## 2023-10-24 RX ORDER — GLUCAGON 1 MG/ML
1 KIT INJECTION PRN
Status: DISCONTINUED | OUTPATIENT
Start: 2023-10-24 | End: 2023-10-28 | Stop reason: HOSPADM

## 2023-10-24 RX ORDER — SODIUM CHLORIDE 9 MG/ML
INJECTION, SOLUTION INTRAVENOUS PRN
Status: DISCONTINUED | OUTPATIENT
Start: 2023-10-24 | End: 2023-10-28 | Stop reason: HOSPADM

## 2023-10-24 RX ORDER — AMLODIPINE BESYLATE 10 MG/1
10 TABLET ORAL DAILY
Status: DISCONTINUED | OUTPATIENT
Start: 2023-10-24 | End: 2023-10-28 | Stop reason: HOSPADM

## 2023-10-24 RX ORDER — SODIUM CHLORIDE 0.9 % (FLUSH) 0.9 %
5-40 SYRINGE (ML) INJECTION EVERY 12 HOURS SCHEDULED
Status: DISCONTINUED | OUTPATIENT
Start: 2023-10-24 | End: 2023-10-28 | Stop reason: HOSPADM

## 2023-10-24 RX ORDER — INSULIN LISPRO 100 [IU]/ML
0-4 INJECTION, SOLUTION INTRAVENOUS; SUBCUTANEOUS NIGHTLY
Status: DISCONTINUED | OUTPATIENT
Start: 2023-10-24 | End: 2023-10-28 | Stop reason: HOSPADM

## 2023-10-24 RX ORDER — POLYETHYLENE GLYCOL 3350 17 G/17G
17 POWDER, FOR SOLUTION ORAL 2 TIMES DAILY
Status: DISCONTINUED | OUTPATIENT
Start: 2023-10-24 | End: 2023-10-26

## 2023-10-24 RX ORDER — ACETAMINOPHEN 650 MG/1
650 SUPPOSITORY RECTAL EVERY 6 HOURS PRN
Status: DISCONTINUED | OUTPATIENT
Start: 2023-10-24 | End: 2023-10-28 | Stop reason: HOSPADM

## 2023-10-24 RX ORDER — DEXTROSE MONOHYDRATE 100 MG/ML
INJECTION, SOLUTION INTRAVENOUS CONTINUOUS PRN
Status: DISCONTINUED | OUTPATIENT
Start: 2023-10-24 | End: 2023-10-28 | Stop reason: HOSPADM

## 2023-10-24 RX ORDER — CARVEDILOL 25 MG/1
25 TABLET ORAL 2 TIMES DAILY WITH MEALS
Status: DISCONTINUED | OUTPATIENT
Start: 2023-10-24 | End: 2023-10-28 | Stop reason: HOSPADM

## 2023-10-24 RX ORDER — ATORVASTATIN CALCIUM 10 MG/1
20 TABLET, FILM COATED ORAL NIGHTLY
Status: DISCONTINUED | OUTPATIENT
Start: 2023-10-24 | End: 2023-10-28 | Stop reason: HOSPADM

## 2023-10-24 RX ORDER — PANTOPRAZOLE SODIUM 40 MG/1
40 TABLET, DELAYED RELEASE ORAL
Status: DISCONTINUED | OUTPATIENT
Start: 2023-10-24 | End: 2023-10-28 | Stop reason: HOSPADM

## 2023-10-24 RX ADMIN — HEPARIN SODIUM 5000 UNITS: 5000 INJECTION INTRAVENOUS; SUBCUTANEOUS at 16:05

## 2023-10-24 RX ADMIN — SODIUM CHLORIDE, POTASSIUM CHLORIDE, SODIUM LACTATE AND CALCIUM CHLORIDE 1000 ML: 600; 310; 30; 20 INJECTION, SOLUTION INTRAVENOUS at 02:06

## 2023-10-24 RX ADMIN — DOCUSATE SODIUM 50 MG AND SENNOSIDES 8.6 MG 2 TABLET: 8.6; 5 TABLET, FILM COATED ORAL at 21:51

## 2023-10-24 RX ADMIN — PANTOPRAZOLE SODIUM 40 MG: 40 TABLET, DELAYED RELEASE ORAL at 09:01

## 2023-10-24 RX ADMIN — LEVOTHYROXINE SODIUM 75 MCG: 0.07 TABLET ORAL at 08:59

## 2023-10-24 RX ADMIN — ASPIRIN 81 MG CHEWABLE TABLET 81 MG: 81 TABLET CHEWABLE at 08:59

## 2023-10-24 RX ADMIN — POLYETHYLENE GLYCOL (3350) 17 G: 17 POWDER, FOR SOLUTION ORAL at 17:57

## 2023-10-24 RX ADMIN — SODIUM CHLORIDE, PRESERVATIVE FREE 10 ML: 5 INJECTION INTRAVENOUS at 21:52

## 2023-10-24 RX ADMIN — INSULIN GLARGINE 10 UNITS: 100 INJECTION, SOLUTION SUBCUTANEOUS at 08:59

## 2023-10-24 RX ADMIN — HEPARIN SODIUM 5000 UNITS: 5000 INJECTION INTRAVENOUS; SUBCUTANEOUS at 21:51

## 2023-10-24 RX ADMIN — MEROPENEM 1000 MG: 1 INJECTION, POWDER, FOR SOLUTION INTRAVENOUS at 03:30

## 2023-10-24 RX ADMIN — LACTULOSE 10 G: 20 SOLUTION ORAL at 08:59

## 2023-10-24 RX ADMIN — CARVEDILOL 25 MG: 25 TABLET, FILM COATED ORAL at 09:13

## 2023-10-24 RX ADMIN — ATORVASTATIN CALCIUM 20 MG: 10 TABLET, FILM COATED ORAL at 21:51

## 2023-10-24 RX ADMIN — SODIUM CHLORIDE, PRESERVATIVE FREE 10 ML: 5 INJECTION INTRAVENOUS at 08:59

## 2023-10-24 RX ADMIN — AMLODIPINE BESYLATE 10 MG: 10 TABLET ORAL at 09:13

## 2023-10-24 RX ADMIN — MEROPENEM 500 MG: 500 INJECTION, POWDER, FOR SOLUTION INTRAVENOUS at 13:01

## 2023-10-24 RX ADMIN — HEPARIN SODIUM 5000 UNITS: 5000 INJECTION INTRAVENOUS; SUBCUTANEOUS at 05:33

## 2023-10-24 RX ADMIN — INSULIN LISPRO 1 UNITS: 100 INJECTION, SOLUTION INTRAVENOUS; SUBCUTANEOUS at 17:56

## 2023-10-24 ASSESSMENT — PAIN SCALES - GENERAL: PAINLEVEL_OUTOF10: 0

## 2023-10-24 NOTE — CONSULTS
[address], 1940, [id number], 10/24/2023    History  [place]:  The encounter diagnosis was Urinary tract infection associated with catheterization of urinary tract, unspecified indwelling urinary catheter type, initial encounter ([address]). Patient  has a past medical history of Acute kidney failure ([address]), Acute urinary tract infection, Anemia, Ataxia, Ataxia, Bacteremia, Candidiasis, Chronic combined systolic and diastolic congestive heart failure ([address]), Chronic kidney disease, Cognitive communication deficit, Diabetes mellitus ([address]), Extended spectrum beta lactamase (ESBL) resistance, Fusion of spine of cervical region, Gait disturbance, History of prostate cancer, History of tobacco use, Hydronephrosis, Hyperkalemia, Hyperlipidemia, Hypertension, Hypertensive heart disease with CHF (congestive heart failure) ([address]), Hypotension, Immunodeficiency ([address]), Metabolic encephalopathy, Muscle weakness, Osteoarthritis, Osteoarthritis, Secondary Hyperaldosteronism ([address]), Supraventricular tachycardia, and Tubulo-interstitial nephritis. Patient  has a past surgical history that includes Prostate surgery; other surgical history (03/28/2013); Colon surgery; Bladder surgery; Prostatectomy (1996); Bladder surgery (Left, 03/17/2022); and Cystoscopy (Left, 09/07/2022). Discharge Recommendation: SNF    Subjective:    Patient states: \"Do I still have that sepsis? Is that why you're dressed up? \"      Pain:  denies pain.       Communication with other providers:  Handoff to RN, OT    Restrictions: general precautions, fall risk, contact isolation, suprapubic catheter    Home Setup/Prior level of function  Social/Functional History  Lives With: Son  Type of Home: House  Home Layout: One level  Home Access: Level entry  Home Equipment: Alexis Solo, rolling  ADL Assistance: Needs assistance  Homemaking Assistance: Needs assistance  Homemaking Responsibilities: commode, and chair CGA  Short Term Goal 3: pt to ambulate 22' with LRAD CGA       Treatment plan:  Bed mobility, transfers, balance, gait, TA, TX    Recommendations for NURSING mobility: stand pivot with RW and gait belt    Time:   Time in: 1107  Time out: 1132  Timed treatment minutes: 10  Total time: 25    Electronically signed by:    Malaika Verduzco PT  10/24/2023, 1:23 PM

## 2023-10-24 NOTE — PROGRESS NOTES
V2.0  [facility] [place]      Name:  Ansley Friend /Age/Sex: 1940  (78 y.o. male)   MRN & CSN:  [id number] & [id number] Encounter Date/Time: 10/24/2023 7:44 AM EDT    Location:  [address] [place] [address]-A PCP: Heather Ruano MD       Hospital Day: 2    Assessment and Plan:   Ansley Friend is a 78 y.o. male who presents with Sepsis ([address])    #Severe sepsis secondary to R pyelonephritis, POA  -Suprapubic catheter, exchanged 10/19/2023  -CT abdomen pelvis with right hydroureter/hydronephrosis and perinephric stranding, no obstructing stone  -Lactic acid 2.4  IV meropenem based on prior cultures of ESBL  Blood and urine cultures, follow up  Consult urology, appreciate recs--> recc antibiotics     #Constipation  Lactulose 10 g once  Still no BM  Started on miralax and doc-senna    #Hyperkalemia-resolved  -K 6.0 on arrival  Resolved without treatment     #PAMELLA on CKD stage IV  Creatinine most recent lab work 1.8  Combination of sepsis and obstructive uropathy  Consult urology  Monitor BMP     #T2DM with hyperglycemia  Baseline low-dose insulin sliding scale  Hypoglycemia protocol     #GERD  Continue Protonix     #Hypertension  Low normal blood pressure trend hold all home antihypertensive medications      Diet ADULT DIET; Regular; 5 carb choices (75 gm/meal)   DVT Prophylaxis [] Lovenox, [x]  Heparin, [] SCDs, [] Ambulation,  [] Eliquis, [] Xarelto  [] Coumadin   Code Status Full Code   Disposition From: home  Expected Disposition: home  Estimated Date of Discharge: 1-2 days  Patient requires continued admission due to urine culture results   Surrogate Decision Maker/ [address], Jon Olvera, Cassandra Gutierrez     Subjective:     Chief Complaint: Dysuria     Patient feeling well. Endorses L sided abdominal pain. No fever but does endorse chills. Had nausea and vomiting prior to coming in. Review of Systems:    As above    Objective:      Intake/Output Summary (Last 24 hours) at 10/24/2023 0.2 0.2 - 1.0 MG/DL    Nitrite Urine, Quantitative POSITIVE (A) NEGATIVE    Leukocyte Esterase, Urine LARGE NUMBER OR AMOUNT OBSERVED (A) NEGATIVE   Urine Microscopic with Reflex to Culture    Collection Time: 10/23/23  5:00 PM   Result Value Ref Range    RBC, UA 48 (H) 0 - 3 /HPF    WBC,  (H) 0 - 2 /HPF    Bacteria, UA NEGATIVE NEGATIVE /HPF    Mucus, UA RARE (A) NEGATIVE HPF    Trichomonas NONE SEEN NONE SEEN /HPF   CBC with Auto Differential    Collection Time: 10/23/23  6:19 PM   Result Value Ref Range    WBC 5.9 4.0 - 10.5 K/CU MM    RBC 3.41 (L) 4.6 - 6.2 M/CU MM    Hemoglobin 10.4 (L) 13.5 - 18.0 GM/DL    Hematocrit 34.8 (L) 42 - 52 %    .1 (H) 78 - 100 FL    MCH 30.5 27 - 31 PG    MCHC 29.9 (L) 32.0 - 36.0 %    RDW 14.4 11.7 - 14.9 %    Platelets 763 620 - 160 K/CU MM    MPV 11.8 (H) 7.5 - 11.1 FL    Differential Type AUTOMATED DIFFERENTIAL     Segs Relative 93.5 (H) 36 - 66 %    Lymphocytes % 3.7 (L) 24 - 44 %    Monocytes % 0.5 0 - 4 %    Eosinophils % 1.9 0 - 3 %    Basophils % 0.2 0 - 1 %    Segs Absolute 5.5 K/CU MM    Lymphocytes Absolute 0.2 K/CU MM    Monocytes Absolute 0.0 K/CU MM    Eosinophils Absolute 0.1 K/CU MM    Basophils Absolute 0.0 K/CU MM    Nucleated RBC % 0.0 %    Total Nucleated RBC 0.0 K/CU MM    TRC 0.0505 K/CU MM    Total Immature Neutrophil 0.01 K/CU MM    Immature Neutrophil % 0.2 0 - 0.43 %   CMP    Collection Time: 10/23/23  6:19 PM   Result Value Ref Range    Sodium 134 (L) 135 - 145 MMOL/L    Potassium 6.0 (HH) 3.5 - 5.1 MMOL/L    Chloride 100 99 - 110 mMol/L    CO2 18 (L) 21 - 32 MMOL/L    Anion Gap 16 4 - 16    Glucose 201 (H) 70 - 99 MG/DL    BUN 44 (H) 6 - 23 MG/DL    Creatinine 2.5 (H) 0.9 - 1.3 MG/DL    Est, Glom Filt Rate 25 (L) >60 mL/min/1.73m2    Calcium 9.5 8.3 - 10.6 MG/DL    Total Protein 8.6 (H) 6.4 - 8.2 GM/DL    Albumin 3.9 3.4 - 5.0 GM/DL    Total Bilirubin 0.4 0.0 - 1.0 MG/DL    Alkaline Phosphatase 113 40 - 129 IU/L    ALT 7 (L) 10 - 40 U/L

## 2023-10-24 NOTE — PLAN OF CARE
Problem: Discharge Planning  Goal: Discharge to home or other facility with appropriate resources  10/24/2023 0614 by Kathy Lui RN  Outcome: Progressing  10/24/2023 0614 by Kathy Lui RN  Outcome: Progressing  Flowsheets (Taken 10/24/2023 0000)  Discharge to home or other facility with appropriate resources:   Identify barriers to discharge with patient and caregiver   Arrange for needed discharge resources and transportation as appropriate   Identify discharge learning needs (meds, wound care, etc)   Arrange for interpreters to assist at discharge as needed   Refer to discharge planning if patient needs post-hospital services based on physician order or complex needs related to functional status, cognitive ability or social support system     Problem: Pain  Goal: Verbalizes/displays adequate comfort level or baseline comfort level  10/24/2023 0614 by Kathy Lui RN  Outcome: Progressing  10/24/2023 0614 by Kathy Lui RN  Outcome: Progressing  Flowsheets  Taken 10/24/2023 0500  Verbalizes/displays adequate comfort level or baseline comfort level:   Encourage patient to monitor pain and request assistance   Assess pain using appropriate pain scale   Administer analgesics based on type and severity of pain and evaluate response   Implement non-pharmacological measures as appropriate and evaluate response   Consider cultural and social influences on pain and pain management   Notify Licensed Independent Practitioner if interventions unsuccessful or patient reports new pain  Taken 10/24/2023 0000  Verbalizes/displays adequate comfort level or baseline comfort level:   Encourage patient to monitor pain and request assistance   Assess pain using appropriate pain scale   Administer analgesics based on type and severity of pain and evaluate response   Implement non-pharmacological measures as appropriate and evaluate response   Consider cultural and social influences on pain and pain management   Notify Licensed Independent Practitioner if interventions unsuccessful or patient reports new pain     Problem: Skin/Tissue Integrity  Goal: Absence of new skin breakdown  Description: 1. Monitor for areas of redness and/or skin breakdown  2. Assess vascular access sites hourly  3. Every 4-6 hours minimum:  Change oxygen saturation probe site  4. Every 4-6 hours:  If on nasal continuous positive airway pressure, respiratory therapy assess nares and determine need for appliance change or resting period.   10/24/2023 0614 by Aldair Camacho RN  Outcome: Progressing  10/24/2023 0614 by Aldair Camacho RN  Outcome: Progressing

## 2023-10-24 NOTE — H&P
V2.0  History and Physical      Name:  Owen Castaneda /Age/Sex: 1940  (78 y.o. male)   MRN & CSN:  [id number] & [id number] Encounter Date/Time:10/23/2023 11:25 PM EDT   Location:  [address] [place] [address] PCP: Antonio Soriano MD       Assessment and Plan:   Owen Castaneda is a 78 y.o. male with hypertension, hyperlipidemia, T2DM, GERD, hypothyroidism, CKD stage IV presented with right-sided abdominal pain. Sepsis POA  Secondary to R pyelonephritis  Suprapubic catheter, exchanged 10/19/2023  CT abdomen pelvis with right hydroureter/hydronephrosis and perinephric stranding, no obstructing stone. IV meropenem based on prior cultures of ESBL  Blood and urine culture    Constipation  Lactulose 10 g once  Optimize bowel regimen    PAMELLA on CKD stage IV  Creatinine most recent lab work 1.8  Combination of sepsis and obstructive uropathy  Consult urology  Monitor BMP    T2DM with hyperglycemia  Baseline low-dose insulin sliding scale  Hypoglycemia protocol    GERD  Continue Protonix    Hypertension  Low normal blood pressure trend hold all home antihypertensive medications    Patient, stepdown telemetry  Full code        Disposition:   Current Living situation: Home  Expected Disposition: Home  Estimated D/C: 2 days    Diet ADULT DIET; Regular; 5 carb choices (75 gm/meal)   DVT Prophylaxis [] Lovenox, [x]  Heparin, [] SCDs, [] Ambulation,  [] Eliquis, [] Xarelto, [] Coumadin   Code Status Full Code   Surrogate Decision Maker/ POA Jon rodriguez     Personally reviewed Lab Studies and Imaging     History from:     patient    History of Present Illness:     Chief Complaint: Abdominal pain    Owen Castaneda is a 78 y.o. male with hypertension, hyperlipidemia, T2DM, GERD, hypothyroidism, CKD stage IV presented with right-sided abdominal pain. Patient was was in his usual state of health and then started having gradual onset of abdominal pain more so on the right side of abdomen, associated with suprapubic tenderness as well. 02-Jan-2020 09:00

## 2023-10-24 NOTE — FLOWSHEET NOTE
.4 Eyes Skin Assessment     NAME:  Louisa Swift  YOB: 1940  MEDICAL RECORD NUMBER:  [id number]    The patient is being assessed for  Admission    I agree that at least one RN has performed a thorough Head to Toe Skin Assessment on the patient. ALL assessment sites listed below have been assessed. Areas assessed by both nurses:    Head, Face, Ears, Shoulders, Back, Chest, Arms, Elbows, Hands, Sacrum. Buttock, Coccyx, Ischium, Legs. Feet and Heels, Under Medical Devices , and Other ***        Does the Patient have a Wound?  No noted wound(s)       Judd Prevention initiated by RN: Yes  Wound Care Orders initiated by RN: Yes    Pressure Injury (Stage 3,4, Unstageable, DTI, NWPT, and Complex wounds) if present, place Wound referral order by RN under : No    New Ostomies, if present place, Ostomy referral order under : No     Nurse 1 eSignature: Electronically signed by Teresa Vasques RN on 10/24/23 at 5:50 AM EDT    **SHARE this note so that the co-signing nurse can place an eSignature**    Nurse 2 eSignature: Electronically signed by Yareli Campos RN on 10/24/23 at 6:47 AM EDT

## 2023-10-24 NOTE — CONSULTS
[facility] [place]  [address], [address]   Consult Note  [facility] 1 2 3 4 5    Date: 10/24/2023   Patient: Katina Garrido   : 1940   DOA: 10/23/2023   MRN: [id number]   ROOM#: [id number]     Reason for Consult:  left ureteral stent and SPT  Requesting Physician:  Dr. Bettina Calvillo  Collaborating Urologist on Call at time of admission:  [address]:  ???    History Obtained From:  electronic medical record    HISTORY OF PRESENT ILLNESS:                The patient is a 78 y.o. male with significant past medical history of prostatectomy for cancer and SPT due to eroded AUS  and chronic left stent for left hydro who presented with AMS - cannot get history from patient    Past Medical History:        Diagnosis Date    Acute kidney failure ([address])     Acute urinary tract infection 03/15/2012    Anemia     Ataxia 2010    Ataxia     Bacteremia     Candidiasis     Chronic combined systolic and diastolic congestive heart failure ([address])     Chronic kidney disease     Cognitive communication deficit     Diabetes mellitus ([address]) 2011    type 2, controlled    Extended spectrum beta lactamase (ESBL) resistance     Fusion of spine of cervical region 10/01/2012    Gait disturbance 2011    History of prostate cancer 1996    adenocarcinoma    History of tobacco use 1959    Hydronephrosis     Hyperkalemia     Hyperlipidemia 2011    Hypertension     Hypertensive heart disease with CHF (congestive heart failure) ([address])     Hypotension     Immunodeficiency ([address])     Metabolic encephalopathy     Muscle weakness     Osteoarthritis 2011    Osteoarthritis     Secondary Hyperaldosteronism ([address])     Supraventricular tachycardia     Tubulo-interstitial nephritis      Past Surgical History:        Procedure Laterality Date    BLADDER SURGERY      BLADDER SURGERY Left 2022    CYSTOSCOPY LEFT STENT EXCHANGE performed by Gisela Wilson MD at [address] [place] [address],[address] Left evidence for appendicitis. Small bowel is normal in caliber. Pelvis: The bladder is collapsed. Suprapubic catheter remains in place. Air within the bladder lumen likely related to Hopper catheter placement. Peritoneum/Retroperitoneum: The abdominal aorta is normal in caliber with severe atherosclerotic plaque present. No retroperitoneal adenopathy. No free fluid or free air. Bones/Soft Tissues: No acute osseous or soft tissue findings. 1. Mild right hydronephrosis and hydroureter with increased perinephric stranding on the right when compared with prior CT. No obstructing stone identified. Correlate clinically to exclude infection given the appearance. 2. Stable appearance of left-sided double-J ureteral stent with no hydronephrosis on the left. 3. Moderate to large volume of stool within the colon. 4. Severe atherosclerotic disease. Assessment & Plan:      Ana Luisa Bauman is a 78y.o. year old male admitted 10/23/2023 for AMS    1) left ureteral stent was exchange 9/19/23 - would treat UTI and follow labs and clinical course - will follow along      Patient seen and examined, chart reviewed.      Electronically signed by Vladimir Morejon MD on 10/24/2023 at 10:48 AM

## 2023-10-24 NOTE — PROGRESS NOTES
23=1-19%(CI), 20-22=20-39%(CJ), 15-19=40-59%(CK), 10-14=60-79%(CL), 7-9=80-99%(CM), 6=100%(CN)]     Cognitive and Psychosocial Functioning:  Overall cognitive status:  Pt is A&Ox3, attention appears intact, and is able to follow multi-step commands w/o difficulty. Affect: Pleasant     Mobility:  Supine to sit: min A  Sit to supine: min A  Sit to stand: CGA  Stand to sit: CGA  Step pivot transfer: CGA  Toilet Transfers: NT      Balance:   Static sitting balance: good-  Dynamic sitting balance: fair+  Static standing balance: fair  Dynamic standing balance: fair-    Pt educated on role of therapy, POC, and d/c recommendations     Treatment:  Self Care Training:   Cues were given for safety, sequence, UE/LE placement, visual cues, and balance. Supine to EOB transfer with min A for trunk elevation and hip maneuvering    Sit to stand x2 with CGA for balance safety, min verbal cues for body mechanics    Donned socks while sitting EOB with Max A to reach feet, pt able to maintain LE elevation during donning     Doffed depends while sitting EOB with min A to get over feet, CGA dynamic sitting balance    Donned depends while sitting EOB with mod A to lace over feet, pull up to knees, and around hips, CGA dynamic sitting balance     Donned gown with min A to reach back and shoulders    Step pivot transfer using 2ww with CGA for balance safety    Safety: Patient left in chair with alarm on, call light within reach, RN notified, gait belt used. Assessment:  Pt is a 77 y/o male admitted for The encounter diagnosis was Urinary tract infection associated with catheterization of urinary tract, unspecified indwelling urinary catheter type, initial encounter ([address]). . Pt at baseline is assist with ADLs and assist with functional transfers/mobility using 4ww. Pt currently presents w/ deficits relating to ADLs, IADLs, UE strength/ROM, functional activity tolerance, cognition, safety awareness, and functional mobility.  Pt would benefit from continued acute care OT services w/ discharge to SNF    Complexity: Moderate   Prognosis: Good, no significant barriers to participation at this time. Occupational Therapy Plan  Times Per Week: 3-4x wk  Times Per Day: Once a day  Current Treatment Recommendations: Strengthening, ROM, Balance training, Functional mobility training, Endurance training, Neuromuscular re-education, Cognitive reorientation, Pain management, Safety education & training, Patient/Caregiver education & training, Equipment evaluation, education, & procurement, Positioning, Self-Care / ADL, Home management training       Goals:  Goal 1: Pt will perform UE ADLs with mod I using AE PRN by d/c  Goal 2: Pt will perform LE ADLs with min A using AE PRN by d/c  Goal 3: Pt will perform toileting with min A using AE PRN by d/c  Goal 4: Pt will perform HH distance functional mobility with SBA using DME PRN in preparation for return to home   Goal 5: Pt will perform functional transfers to/from bed, chair, and toilet  with SBA using AE PRN by d/c  Goal 6: Pt will perform therex/theract in order to increase functional activity tolerance    Treatment plan:  Pt will perform therex/theract in order to increase functional activity tolerance in preparation for ADL participation.      Recommendations for NURSING activity: Up to chair for all 3 meals and up to [facility] for all toileting needs    Time:   Time in: 11:07  Time out: 11:32  Timed treatment minutes: 10  Total time: 25    Electronically signed by:    EMA Stanford/NORA [id number]  10/24/2023, 4:02 PM

## 2023-10-24 NOTE — ED NOTES
ED TO INPATIENT SBAR HANDOFF    Patient Name: Ansley Friend   :  1940  78 y.o. Preferred Name    Family/Caregiver Present no   Restraints no   C-SSRS: Risk of Suicide: No Risk  Sitter no   Sepsis Risk Score Sepsis Risk Score: 4.26      Situation  Chief Complaint   Patient presents with    Dysuria     Brief Description of Patient's Condition: Patient came in with dysuria and pain to left flank area. Patient has a chronic indwelling catheter and was flagging septic. Mental Status: oriented, alert, coherent, logical, thought processes intact, and able to concentrate and follow conversation  Arrived from: home    Imaging:   CT ABDOMEN PELVIS WO CONTRAST Additional Contrast? None   Final Result   1. Mild right hydronephrosis and hydroureter with increased perinephric   stranding on the right when compared with prior CT. No obstructing stone   identified. Correlate clinically to exclude infection given the appearance. 2. Stable appearance of left-sided double-J ureteral stent with no   hydronephrosis on the left. 3. Moderate to large volume of stool within the colon. 4. Severe atherosclerotic disease.            Abnormal labs:   Abnormal Labs Reviewed   URINALYSIS WITH REFLEX TO CULTURE - Abnormal; Notable for the following components:       Result Value    Blood, Urine LARGE NUMBER OR AMOUNT OBSERVED (*)     Protein,  (*)     Nitrite Urine, Quantitative POSITIVE (*)     Leukocyte Esterase, Urine LARGE NUMBER OR AMOUNT OBSERVED (*)     All other components within normal limits   URINE MICROSCOPIC WITH REFLEX TO CULTURE - Abnormal; Notable for the following components:    RBC, UA 48 (*)     WBC,  (*)     Mucus, UA RARE (*)     All other components within normal limits   CBC WITH AUTO DIFFERENTIAL - Abnormal; Notable for the following components:    RBC 3.41 (*)     Hemoglobin 10.4 (*)     Hematocrit 34.8 (*)     .1 (*)     MCHC 29.9 (*)     MPV 11.8 (*)     Segs Relative 93.5 (*) Lymphocytes % 3.7 (*)     All other components within normal limits   COMPREHENSIVE METABOLIC PANEL - Abnormal; Notable for the following components:    Sodium 134 (*)     Potassium 6.0 (*)     CO2 18 (*)     Glucose 201 (*)     BUN 44 (*)     Creatinine 2.5 (*)     Est, Glom Filt Rate 25 (*)     Total Protein 8.6 (*)     ALT 7 (*)     All other components within normal limits   BRAIN NATRIURETIC PEPTIDE - Abnormal; Notable for the following components:    Pro-BNP 1,174 (*)     All other components within normal limits   LACTIC ACID - Abnormal; Notable for the following components:    Lactate 2.4 (*)     All other components within normal limits   BASIC METABOLIC PANEL - Abnormal; Notable for the following components:    CO2 19 (*)     Glucose 173 (*)     BUN 42 (*)     Creatinine 2.4 (*)     Est, Glom Filt Rate 26 (*)     All other components within normal limits       Background  History:   Past Medical History:   Diagnosis Date    Acute kidney failure (HCC)     Acute urinary tract infection 03/15/2012    Anemia     Ataxia 01/01/2010    Ataxia     Bacteremia     Candidiasis     Chronic combined systolic and diastolic congestive heart failure (HCC)     Chronic kidney disease     Cognitive communication deficit     Diabetes mellitus ([address]) 01/01/2011    type 2, controlled    Extended spectrum beta lactamase (ESBL) resistance     Fusion of spine of cervical region 10/01/2012    Gait disturbance 01/01/2011    History of prostate cancer 01/01/1996    adenocarcinoma    History of tobacco use 01/01/1959    Hydronephrosis     Hyperkalemia     Hyperlipidemia 01/01/2011    Hypertension     Hypertensive heart disease with CHF (congestive heart failure) (HCC)     Hypotension     Immunodeficiency ([facility])     Metabolic encephalopathy     Muscle weakness     Osteoarthritis 01/01/2011    Osteoarthritis     Secondary Hyperaldosteronism (HCC)     Supraventricular tachycardia     Tubulo-interstitial nephritis

## 2023-10-24 NOTE — CARE COORDINATION
10/24/23 1638   Service Assessment   Patient Orientation Alert and Oriented   Cognition Alert   History Provided By Patient   Primary [facility]   Patient's Healthcare Decision Maker is: Legal Next of Kin   PCP Verified by CM Yes   Prior Functional Level Assistance with the following:;Mobility   Current Functional Level Assistance with the following:;Mobility   Can patient return to prior living arrangement Unknown at present   Ability to make needs known: Good   Family able to assist with home care needs: Yes   Would you like for me to discuss the discharge plan with any other family members/significant others, and if so, who? No   Financial Resources Medicare   Community Resources ECF/Home Care     CM in to see Pt to initiate discharge planning. Pt from home with his son. Pt would like home care at discharge. Pt recently had [facility] and was not happy. Pt requests [facility]. PS to Dr. Darlene Mendes to update, [facility]. order requested.

## 2023-10-24 NOTE — FLOWSHEET NOTE
.4 Eyes Skin Assessment     NAME:  Shazia Mtz  YOB: 1940  MEDICAL RECORD NUMBER:  [id number]    The patient is being assessed for  Admission    I agree that at least one RN has performed a thorough Head to Toe Skin Assessment on the patient. ALL assessment sites listed below have been assessed. Areas assessed by both nurses:    Head, Face, Ears, Shoulders, Back, Chest, Arms, Elbows, Hands, Sacrum. Buttock, Coccyx, Ischium, Legs. Feet and Heels, Under Medical Devices , and Other ***        Does the Patient have a Wound?  No noted wound(s)       Judd Prevention initiated by RN: Yes  Wound Care Orders initiated by RN: Yes    Pressure Injury (Stage 3,4, Unstageable, DTI, NWPT, and Complex wounds) if present, place Wound referral order by RN under : No    New Ostomies, if present place, Ostomy referral order under : No     Nurse 1 eSignature: Electronically signed by Denver Sills, RN on 10/24/23 at 5:50 AM EDT    **SHARE this note so that the co-signing nurse can place an eSignature**    Nurse 2 eSignature: Electronically signed by Nanci Garcia RN on 10/24/23 at 6:47 AM EDT

## 2023-10-25 LAB
ANION GAP SERPL CALCULATED.3IONS-SCNC: 12 MMOL/L (ref 4–16)
BASOPHILS ABSOLUTE: 0.1 K/CU MM
BASOPHILS RELATIVE PERCENT: 0.7 % (ref 0–1)
BUN SERPL-MCNC: 36 MG/DL (ref 6–23)
CALCIUM SERPL-MCNC: 8.3 MG/DL (ref 8.3–10.6)
CHLORIDE BLD-SCNC: 103 MMOL/L (ref 99–110)
CO2: 19 MMOL/L (ref 21–32)
CREAT SERPL-MCNC: 2.3 MG/DL (ref 0.9–1.3)
CREATININE URINE: 55.9 MG/DL (ref 39–259)
DIFFERENTIAL TYPE: ABNORMAL
EOSINOPHILS ABSOLUTE: 0.5 K/CU MM
EOSINOPHILS RELATIVE PERCENT: 5.8 % (ref 0–3)
GFR SERPL CREATININE-BSD FRML MDRD: 27 ML/MIN/1.73M2
GLUCOSE BLD-MCNC: 134 MG/DL (ref 70–99)
GLUCOSE BLD-MCNC: 177 MG/DL (ref 70–99)
GLUCOSE BLD-MCNC: 232 MG/DL (ref 70–99)
GLUCOSE BLD-MCNC: 337 MG/DL (ref 70–99)
GLUCOSE SERPL-MCNC: 164 MG/DL (ref 70–99)
HCT VFR BLD CALC: 30.7 % (ref 42–52)
HEMOGLOBIN: 8.5 GM/DL (ref 13.5–18)
IMMATURE NEUTROPHIL %: 0.2 % (ref 0–0.43)
LYMPHOCYTES ABSOLUTE: 1.1 K/CU MM
LYMPHOCYTES RELATIVE PERCENT: 13.5 % (ref 24–44)
MCH RBC QN AUTO: 30.4 PG (ref 27–31)
MCHC RBC AUTO-ENTMCNC: 27.7 % (ref 32–36)
MCV RBC AUTO: 109.6 FL (ref 78–100)
MONOCYTES ABSOLUTE: 0.8 K/CU MM
MONOCYTES RELATIVE PERCENT: 9.2 % (ref 0–4)
NUCLEATED RBC %: 0 %
PDW BLD-RTO: 14.2 % (ref 11.7–14.9)
PLATELET # BLD: 201 K/CU MM (ref 140–440)
PMV BLD AUTO: 11.8 FL (ref 7.5–11.1)
POTASSIUM SERPL-SCNC: 4.5 MMOL/L (ref 3.5–5.1)
PROT/CREAT RATIO, UR: 1
RBC # BLD: 2.8 M/CU MM (ref 4.6–6.2)
SEGMENTED NEUTROPHILS ABSOLUTE COUNT: 5.7 K/CU MM
SEGMENTED NEUTROPHILS RELATIVE PERCENT: 70.6 % (ref 36–66)
SODIUM BLD-SCNC: 134 MMOL/L (ref 135–145)
SODIUM URINE: 69 MMOL/L (ref 35–167)
TOTAL IMMATURE NEUTOROPHIL: 0.02 K/CU MM
TOTAL NUCLEATED RBC: 0 K/CU MM
URINE TOTAL PROTEIN: 53.8 MG/DL
WBC # BLD: 8.1 K/CU MM (ref 4–10.5)

## 2023-10-25 PROCEDURE — 2140000000 HC CCU INTERMEDIATE R&B

## 2023-10-25 PROCEDURE — 6370000000 HC RX 637 (ALT 250 FOR IP): Performed by: INTERNAL MEDICINE

## 2023-10-25 PROCEDURE — 80048 BASIC METABOLIC PNL TOTAL CA: CPT

## 2023-10-25 PROCEDURE — 6370000000 HC RX 637 (ALT 250 FOR IP): Performed by: STUDENT IN AN ORGANIZED HEALTH CARE EDUCATION/TRAINING PROGRAM

## 2023-10-25 PROCEDURE — 36415 COLL VENOUS BLD VENIPUNCTURE: CPT

## 2023-10-25 PROCEDURE — 82570 ASSAY OF URINE CREATININE: CPT

## 2023-10-25 PROCEDURE — 84156 ASSAY OF PROTEIN URINE: CPT

## 2023-10-25 PROCEDURE — 84300 ASSAY OF URINE SODIUM: CPT

## 2023-10-25 PROCEDURE — 6360000002 HC RX W HCPCS: Performed by: STUDENT IN AN ORGANIZED HEALTH CARE EDUCATION/TRAINING PROGRAM

## 2023-10-25 PROCEDURE — 2580000003 HC RX 258: Performed by: STUDENT IN AN ORGANIZED HEALTH CARE EDUCATION/TRAINING PROGRAM

## 2023-10-25 PROCEDURE — 94761 N-INVAS EAR/PLS OXIMETRY MLT: CPT

## 2023-10-25 PROCEDURE — 85025 COMPLETE CBC W/AUTO DIFF WBC: CPT

## 2023-10-25 PROCEDURE — 82962 GLUCOSE BLOOD TEST: CPT

## 2023-10-25 RX ORDER — ONDANSETRON 2 MG/ML
4 INJECTION INTRAMUSCULAR; INTRAVENOUS EVERY 6 HOURS PRN
Status: DISCONTINUED | OUTPATIENT
Start: 2023-10-25 | End: 2023-10-28 | Stop reason: HOSPADM

## 2023-10-25 RX ORDER — ONDANSETRON 4 MG/1
4 TABLET, ORALLY DISINTEGRATING ORAL EVERY 8 HOURS PRN
Status: DISCONTINUED | OUTPATIENT
Start: 2023-10-25 | End: 2023-10-28 | Stop reason: HOSPADM

## 2023-10-25 RX ADMIN — SODIUM CHLORIDE, PRESERVATIVE FREE 10 ML: 5 INJECTION INTRAVENOUS at 21:47

## 2023-10-25 RX ADMIN — HEPARIN SODIUM 5000 UNITS: 5000 INJECTION INTRAVENOUS; SUBCUTANEOUS at 05:44

## 2023-10-25 RX ADMIN — MEROPENEM 500 MG: 500 INJECTION, POWDER, FOR SOLUTION INTRAVENOUS at 01:12

## 2023-10-25 RX ADMIN — SODIUM CHLORIDE: 9 INJECTION, SOLUTION INTRAVENOUS at 13:21

## 2023-10-25 RX ADMIN — CARVEDILOL 25 MG: 25 TABLET, FILM COATED ORAL at 16:34

## 2023-10-25 RX ADMIN — INSULIN LISPRO 3 UNITS: 100 INJECTION, SOLUTION INTRAVENOUS; SUBCUTANEOUS at 13:34

## 2023-10-25 RX ADMIN — ATORVASTATIN CALCIUM 20 MG: 10 TABLET, FILM COATED ORAL at 21:31

## 2023-10-25 RX ADMIN — AMLODIPINE BESYLATE 10 MG: 10 TABLET ORAL at 09:51

## 2023-10-25 RX ADMIN — SODIUM CHLORIDE, PRESERVATIVE FREE 10 ML: 5 INJECTION INTRAVENOUS at 09:51

## 2023-10-25 RX ADMIN — INSULIN LISPRO 1 UNITS: 100 INJECTION, SOLUTION INTRAVENOUS; SUBCUTANEOUS at 16:33

## 2023-10-25 RX ADMIN — ASPIRIN 81 MG CHEWABLE TABLET 81 MG: 81 TABLET CHEWABLE at 09:52

## 2023-10-25 RX ADMIN — HEPARIN SODIUM 5000 UNITS: 5000 INJECTION INTRAVENOUS; SUBCUTANEOUS at 21:31

## 2023-10-25 RX ADMIN — PANTOPRAZOLE SODIUM 40 MG: 40 TABLET, DELAYED RELEASE ORAL at 05:44

## 2023-10-25 RX ADMIN — HEPARIN SODIUM 5000 UNITS: 5000 INJECTION INTRAVENOUS; SUBCUTANEOUS at 14:46

## 2023-10-25 RX ADMIN — CARVEDILOL 25 MG: 25 TABLET, FILM COATED ORAL at 09:52

## 2023-10-25 RX ADMIN — SODIUM CHLORIDE: 9 INJECTION, SOLUTION INTRAVENOUS at 09:49

## 2023-10-25 RX ADMIN — MEROPENEM 500 MG: 500 INJECTION, POWDER, FOR SOLUTION INTRAVENOUS at 13:23

## 2023-10-25 RX ADMIN — LEVOTHYROXINE SODIUM 75 MCG: 0.07 TABLET ORAL at 05:44

## 2023-10-25 ASSESSMENT — PAIN DESCRIPTION - ONSET: ONSET: GRADUAL

## 2023-10-25 ASSESSMENT — PAIN SCALES - GENERAL
PAINLEVEL_OUTOF10: 0
PAINLEVEL_OUTOF10: 4

## 2023-10-25 ASSESSMENT — PAIN DESCRIPTION - ORIENTATION: ORIENTATION: RIGHT;LEFT

## 2023-10-25 ASSESSMENT — PAIN - FUNCTIONAL ASSESSMENT: PAIN_FUNCTIONAL_ASSESSMENT: ACTIVITIES ARE NOT PREVENTED

## 2023-10-25 ASSESSMENT — PAIN SCALES - WONG BAKER: WONGBAKER_NUMERICALRESPONSE: 0

## 2023-10-25 ASSESSMENT — PAIN DESCRIPTION - PAIN TYPE: TYPE: ACUTE PAIN

## 2023-10-25 ASSESSMENT — PAIN DESCRIPTION - DESCRIPTORS: DESCRIPTORS: ACHING

## 2023-10-25 ASSESSMENT — PAIN DESCRIPTION - LOCATION: LOCATION: FOOT

## 2023-10-25 ASSESSMENT — PAIN DESCRIPTION - FREQUENCY: FREQUENCY: CONTINUOUS

## 2023-10-25 NOTE — PROGRESS NOTES
and/or weight based adjustment of the mA/kV was utilized to reduce the radiation dose to as low as reasonably achievable. COMPARISON: CT abdomen pelvis August 6, 2023 HISTORY: ORDERING SYSTEM PROVIDED HISTORY: left flank pain TECHNOLOGIST PROVIDED HISTORY: Reason for exam:->left flank pain Additional Contrast?->None Decision Support Exception - unselect if not a suspected or confirmed emergency medical condition->Emergency Medical Condition (MA) Reason for Exam: left flank pain FINDINGS: Lower Chest: Chronic scarring/fibrotic changes of the lung bases. Organs: Evaluation of the solid organs is limited due to lack of intravenous contrast.  The nonenhanced liver demonstrates no acute process. Gallbladder demonstrates no acute abnormality. Nonenhanced spleen, pancreas, and adrenal glands demonstrate no acute process. A double-J left-sided ureteral stent remains in place as on previous exam.  No hydronephrosis on the left. Mild hydronephrosis and hydroureter on the right. No obstructing stone identified. Increased perinephric stranding is present on the right when compared with prior CT. Correlate clinically to exclude infection. GI/Bowel: No bowel obstruction. Moderate to large volume of stool throughout the colon. No evidence for appendicitis. Small bowel is normal in caliber. Pelvis: The bladder is collapsed. Suprapubic catheter remains in place. Air within the bladder lumen likely related to Hopper catheter placement. Peritoneum/Retroperitoneum: The abdominal aorta is normal in caliber with severe atherosclerotic plaque present. No retroperitoneal adenopathy. No free fluid or free air. Bones/Soft Tissues: No acute osseous or soft tissue findings. 1. Mild right hydronephrosis and hydroureter with increased perinephric stranding on the right when compared with prior CT. No obstructing stone identified. Correlate clinically to exclude infection given the appearance.  2. Stable appearance of left-sided

## 2023-10-26 ENCOUNTER — APPOINTMENT (OUTPATIENT)
Dept: GENERAL RADIOLOGY | Age: 83
End: 2023-10-26
Payer: MEDICARE

## 2023-10-26 LAB
ALBUMIN SERPL-MCNC: 3 GM/DL (ref 3.4–5)
ALP BLD-CCNC: 75 IU/L (ref 40–128)
ALT SERPL-CCNC: 6 U/L (ref 10–40)
ANION GAP SERPL CALCULATED.3IONS-SCNC: 10 MMOL/L (ref 4–16)
AST SERPL-CCNC: 12 IU/L (ref 15–37)
BILIRUB SERPL-MCNC: 0.2 MG/DL (ref 0–1)
BUN SERPL-MCNC: 33 MG/DL (ref 6–23)
CALCIUM SERPL-MCNC: 8.6 MG/DL (ref 8.3–10.6)
CHLORIDE BLD-SCNC: 106 MMOL/L (ref 99–110)
CO2: 22 MMOL/L (ref 21–32)
CREAT SERPL-MCNC: 2.2 MG/DL (ref 0.9–1.3)
CULTURE: ABNORMAL
CULTURE: ABNORMAL
GFR SERPL CREATININE-BSD FRML MDRD: 29 ML/MIN/1.73M2
GLUCOSE BLD-MCNC: 138 MG/DL (ref 70–99)
GLUCOSE BLD-MCNC: 149 MG/DL (ref 70–99)
GLUCOSE BLD-MCNC: 175 MG/DL (ref 70–99)
GLUCOSE BLD-MCNC: 244 MG/DL (ref 70–99)
GLUCOSE BLD-MCNC: 306 MG/DL (ref 70–99)
GLUCOSE SERPL-MCNC: 143 MG/DL (ref 70–99)
Lab: ABNORMAL
MAGNESIUM: 1.8 MG/DL (ref 1.8–2.4)
PHOSPHORUS: 3 MG/DL (ref 2.5–4.9)
POTASSIUM SERPL-SCNC: 4.8 MMOL/L (ref 3.5–5.1)
SODIUM BLD-SCNC: 138 MMOL/L (ref 135–145)
SPECIMEN: ABNORMAL
TOTAL PROTEIN: 5.6 GM/DL (ref 6.4–8.2)

## 2023-10-26 PROCEDURE — 36415 COLL VENOUS BLD VENIPUNCTURE: CPT

## 2023-10-26 PROCEDURE — 83735 ASSAY OF MAGNESIUM: CPT

## 2023-10-26 PROCEDURE — 71046 X-RAY EXAM CHEST 2 VIEWS: CPT

## 2023-10-26 PROCEDURE — 84100 ASSAY OF PHOSPHORUS: CPT

## 2023-10-26 PROCEDURE — 82962 GLUCOSE BLOOD TEST: CPT

## 2023-10-26 PROCEDURE — 6370000000 HC RX 637 (ALT 250 FOR IP): Performed by: STUDENT IN AN ORGANIZED HEALTH CARE EDUCATION/TRAINING PROGRAM

## 2023-10-26 PROCEDURE — 80053 COMPREHEN METABOLIC PANEL: CPT

## 2023-10-26 PROCEDURE — 2140000000 HC CCU INTERMEDIATE R&B

## 2023-10-26 PROCEDURE — 6360000002 HC RX W HCPCS: Performed by: STUDENT IN AN ORGANIZED HEALTH CARE EDUCATION/TRAINING PROGRAM

## 2023-10-26 PROCEDURE — 6370000000 HC RX 637 (ALT 250 FOR IP)

## 2023-10-26 PROCEDURE — 94761 N-INVAS EAR/PLS OXIMETRY MLT: CPT

## 2023-10-26 PROCEDURE — 6370000000 HC RX 637 (ALT 250 FOR IP): Performed by: INTERNAL MEDICINE

## 2023-10-26 PROCEDURE — 2580000003 HC RX 258: Performed by: STUDENT IN AN ORGANIZED HEALTH CARE EDUCATION/TRAINING PROGRAM

## 2023-10-26 RX ORDER — METHOCARBAMOL 500 MG/1
1000 TABLET, FILM COATED ORAL ONCE
Status: COMPLETED | OUTPATIENT
Start: 2023-10-26 | End: 2023-10-26

## 2023-10-26 RX ADMIN — MEROPENEM 500 MG: 500 INJECTION, POWDER, FOR SOLUTION INTRAVENOUS at 00:36

## 2023-10-26 RX ADMIN — HEPARIN SODIUM 5000 UNITS: 5000 INJECTION INTRAVENOUS; SUBCUTANEOUS at 22:32

## 2023-10-26 RX ADMIN — INSULIN LISPRO 3 UNITS: 100 INJECTION, SOLUTION INTRAVENOUS; SUBCUTANEOUS at 12:33

## 2023-10-26 RX ADMIN — AMLODIPINE BESYLATE 10 MG: 10 TABLET ORAL at 08:21

## 2023-10-26 RX ADMIN — ATORVASTATIN CALCIUM 20 MG: 10 TABLET, FILM COATED ORAL at 22:35

## 2023-10-26 RX ADMIN — CARVEDILOL 25 MG: 25 TABLET, FILM COATED ORAL at 16:54

## 2023-10-26 RX ADMIN — CARVEDILOL 25 MG: 25 TABLET, FILM COATED ORAL at 08:21

## 2023-10-26 RX ADMIN — HEPARIN SODIUM 5000 UNITS: 5000 INJECTION INTRAVENOUS; SUBCUTANEOUS at 12:33

## 2023-10-26 RX ADMIN — HEPARIN SODIUM 5000 UNITS: 5000 INJECTION INTRAVENOUS; SUBCUTANEOUS at 06:19

## 2023-10-26 RX ADMIN — SODIUM CHLORIDE, PRESERVATIVE FREE 10 ML: 5 INJECTION INTRAVENOUS at 22:34

## 2023-10-26 RX ADMIN — LEVOTHYROXINE SODIUM 75 MCG: 0.07 TABLET ORAL at 06:19

## 2023-10-26 RX ADMIN — SODIUM CHLORIDE, PRESERVATIVE FREE 10 ML: 5 INJECTION INTRAVENOUS at 08:21

## 2023-10-26 RX ADMIN — METHOCARBAMOL 1000 MG: 500 TABLET ORAL at 06:19

## 2023-10-26 RX ADMIN — PANTOPRAZOLE SODIUM 40 MG: 40 TABLET, DELAYED RELEASE ORAL at 06:19

## 2023-10-26 RX ADMIN — MEROPENEM 500 MG: 500 INJECTION, POWDER, FOR SOLUTION INTRAVENOUS at 12:35

## 2023-10-26 RX ADMIN — ASPIRIN 81 MG CHEWABLE TABLET 81 MG: 81 TABLET CHEWABLE at 08:21

## 2023-10-26 ASSESSMENT — PAIN DESCRIPTION - ONSET: ONSET: ON-GOING

## 2023-10-26 ASSESSMENT — PAIN DESCRIPTION - LOCATION: LOCATION: FOOT

## 2023-10-26 ASSESSMENT — PAIN DESCRIPTION - DESCRIPTORS: DESCRIPTORS: ACHING

## 2023-10-26 ASSESSMENT — PAIN DESCRIPTION - FREQUENCY: FREQUENCY: CONTINUOUS

## 2023-10-26 ASSESSMENT — PAIN SCALES - GENERAL
PAINLEVEL_OUTOF10: 0
PAINLEVEL_OUTOF10: 6

## 2023-10-26 ASSESSMENT — PAIN DESCRIPTION - PAIN TYPE: TYPE: ACUTE PAIN

## 2023-10-26 ASSESSMENT — PAIN SCALES - WONG BAKER: WONGBAKER_NUMERICALRESPONSE: 0

## 2023-10-26 ASSESSMENT — PAIN - FUNCTIONAL ASSESSMENT: PAIN_FUNCTIONAL_ASSESSMENT: ACTIVITIES ARE NOT PREVENTED

## 2023-10-26 ASSESSMENT — PAIN DESCRIPTION - ORIENTATION: ORIENTATION: LEFT;RIGHT

## 2023-10-26 NOTE — CARE COORDINATION
CM in to see Pt to follow up on discharge planning. Pt son [address] present. Pt is requesting referral to ARU. Pt not agreeable with therapy recommendation of SNF at this time due to negative experiences in the past.     Discharge plan if denied by ARU will be home with home care. Pt choice of [facility].      Referral made to Avani/MILI    Whiteboard to therapy to update

## 2023-10-26 NOTE — PROGRESS NOTES
V2.0  [facility] Hospital[facility] Progress Note      Name:  Tori Wolf /Age/Sex: 1940  (78 y.o. male)   MRN & CSN:  [id number] & [id number] Encounter Date/Time: 10/26/2023 7:44 AM EDT    Location:  [address] [place] [address]-A PCP: Kwesi Davenport MD       Hospital Day: 4    Assessment and Plan:   Tori Wolf is a 78 y.o. male who presents with Sepsis ([address])    #Severe sepsis secondary to R pyelonephritis, POA  -Suprapubic catheter, exchanged 10/19/2023  -CT abdomen pelvis with right hydroureter/hydronephrosis and perinephric stranding, no obstructing stone  -Lactic acid 2.4  -Ucx growing ESBL containing E coli  Will need to be dc on IV meropenem/ertapenem  ID consulted for dc antibiotic recs  Blood and urine cultures, follow up  Consult urology, appreciate recs--> recc antibiotics  --> ID recommends changing left ureteral stent due to likely colonization with ESBL E. Coli  Ertapenem for total 10 antibiotic days     #Constipation-resolved  Lactulose 10 g once  Still no BM  Started on miralax and doc-senna    #Hyperkalemia-resolved  -K 6.0 on arrival  Resolved without treatment     #PAMELLA on CKD stage IV-improving  Creatinine most recent lab work 1.8  10/25: Cr 2.3, down from 2.5  Combination of sepsis and obstructive uropathy  Consult urology  Nephrology consulted, appreciate recs     #T2DM with hyperglycemia  Baseline low-dose insulin sliding scale  Hypoglycemia protocol     #GERD  Continue Protonix     #Hypertension  Low normal blood pressure trend hold all home antihypertensive medications      Diet ADULT DIET;  Regular; 5 carb choices (75 gm/meal)   DVT Prophylaxis [] Lovenox, [x]  Heparin, [] SCDs, [] Ambulation,  [] Eliquis, [] Xarelto  [] Coumadin   Code Status Full Code   Disposition From: home  Expected Disposition: ARU referral sent  Estimated Date of Discharge: 1-2 days  Patient requires continued admission due to PAMELLA, nephro consult, pending kidney injury improvement, placement   Surrogate Decision Maker/ POA severe atherosclerotic plaque present. No retroperitoneal adenopathy. No free fluid or free air. Bones/Soft Tissues: No acute osseous or soft tissue findings. 1. Mild right hydronephrosis and hydroureter with increased perinephric stranding on the right when compared with prior CT. No obstructing stone identified. Correlate clinically to exclude infection given the appearance. 2. Stable appearance of left-sided double-J ureteral stent with no hydronephrosis on the left. 3. Moderate to large volume of stool within the colon. 4. Severe atherosclerotic disease.        Electronically signed by Nancy Malave MD on 10/26/2023 at 8:39 AM

## 2023-10-27 LAB
ALBUMIN SERPL-MCNC: 3.1 GM/DL (ref 3.4–5)
ALP BLD-CCNC: 78 IU/L (ref 40–128)
ALT SERPL-CCNC: 7 U/L (ref 10–40)
ANION GAP SERPL CALCULATED.3IONS-SCNC: 10 MMOL/L (ref 4–16)
ANION GAP SERPL CALCULATED.3IONS-SCNC: 10 MMOL/L (ref 4–16)
ANION GAP SERPL CALCULATED.3IONS-SCNC: 9 MMOL/L (ref 4–16)
AST SERPL-CCNC: 11 IU/L (ref 15–37)
BILIRUB SERPL-MCNC: 0.2 MG/DL (ref 0–1)
BUN SERPL-MCNC: 27 MG/DL (ref 6–23)
BUN SERPL-MCNC: 27 MG/DL (ref 6–23)
BUN SERPL-MCNC: 29 MG/DL (ref 6–23)
CALCIUM SERPL-MCNC: 8.9 MG/DL (ref 8.3–10.6)
CALCIUM SERPL-MCNC: 9.1 MG/DL (ref 8.3–10.6)
CALCIUM SERPL-MCNC: 9.1 MG/DL (ref 8.3–10.6)
CHLORIDE BLD-SCNC: 102 MMOL/L (ref 99–110)
CHLORIDE BLD-SCNC: 103 MMOL/L (ref 99–110)
CHLORIDE BLD-SCNC: 107 MMOL/L (ref 99–110)
CO2: 22 MMOL/L (ref 21–32)
CO2: 23 MMOL/L (ref 21–32)
CO2: 24 MMOL/L (ref 21–32)
CREAT SERPL-MCNC: 2 MG/DL (ref 0.9–1.3)
GFR SERPL CREATININE-BSD FRML MDRD: 32 ML/MIN/1.73M2
GLUCOSE BLD-MCNC: 172 MG/DL (ref 70–99)
GLUCOSE BLD-MCNC: 208 MG/DL (ref 70–99)
GLUCOSE BLD-MCNC: 284 MG/DL (ref 70–99)
GLUCOSE SERPL-MCNC: 203 MG/DL (ref 70–99)
GLUCOSE SERPL-MCNC: 203 MG/DL (ref 70–99)
GLUCOSE SERPL-MCNC: 325 MG/DL (ref 70–99)
MAGNESIUM: 1.8 MG/DL (ref 1.8–2.4)
PHOSPHORUS: 3 MG/DL (ref 2.5–4.9)
POTASSIUM SERPL-SCNC: 4.8 MMOL/L (ref 3.5–5.1)
POTASSIUM SERPL-SCNC: 5.1 MMOL/L (ref 3.5–5.1)
POTASSIUM SERPL-SCNC: 6 MMOL/L (ref 3.5–5.1)
SODIUM BLD-SCNC: 136 MMOL/L (ref 135–145)
SODIUM BLD-SCNC: 136 MMOL/L (ref 135–145)
SODIUM BLD-SCNC: 138 MMOL/L (ref 135–145)
TOTAL PROTEIN: 5.7 GM/DL (ref 6.4–8.2)

## 2023-10-27 PROCEDURE — 6370000000 HC RX 637 (ALT 250 FOR IP): Performed by: INTERNAL MEDICINE

## 2023-10-27 PROCEDURE — 80048 BASIC METABOLIC PNL TOTAL CA: CPT

## 2023-10-27 PROCEDURE — 76937 US GUIDE VASCULAR ACCESS: CPT

## 2023-10-27 PROCEDURE — 2140000000 HC CCU INTERMEDIATE R&B

## 2023-10-27 PROCEDURE — 36415 COLL VENOUS BLD VENIPUNCTURE: CPT

## 2023-10-27 PROCEDURE — 6360000002 HC RX W HCPCS: Performed by: STUDENT IN AN ORGANIZED HEALTH CARE EDUCATION/TRAINING PROGRAM

## 2023-10-27 PROCEDURE — 97110 THERAPEUTIC EXERCISES: CPT

## 2023-10-27 PROCEDURE — 94761 N-INVAS EAR/PLS OXIMETRY MLT: CPT

## 2023-10-27 PROCEDURE — 6370000000 HC RX 637 (ALT 250 FOR IP): Performed by: STUDENT IN AN ORGANIZED HEALTH CARE EDUCATION/TRAINING PROGRAM

## 2023-10-27 PROCEDURE — 84100 ASSAY OF PHOSPHORUS: CPT

## 2023-10-27 PROCEDURE — 6370000000 HC RX 637 (ALT 250 FOR IP): Performed by: NURSE PRACTITIONER

## 2023-10-27 PROCEDURE — 2580000003 HC RX 258: Performed by: STUDENT IN AN ORGANIZED HEALTH CARE EDUCATION/TRAINING PROGRAM

## 2023-10-27 PROCEDURE — 82962 GLUCOSE BLOOD TEST: CPT

## 2023-10-27 PROCEDURE — 97535 SELF CARE MNGMENT TRAINING: CPT

## 2023-10-27 PROCEDURE — 80053 COMPREHEN METABOLIC PANEL: CPT

## 2023-10-27 PROCEDURE — 83735 ASSAY OF MAGNESIUM: CPT

## 2023-10-27 PROCEDURE — 97116 GAIT TRAINING THERAPY: CPT

## 2023-10-27 RX ORDER — SODIUM CHLORIDE 0.9 % (FLUSH) 0.9 %
5-40 SYRINGE (ML) INJECTION EVERY 12 HOURS SCHEDULED
Status: CANCELLED | OUTPATIENT
Start: 2023-10-27

## 2023-10-27 RX ORDER — PANTOPRAZOLE SODIUM 40 MG/1
40 TABLET, DELAYED RELEASE ORAL
Status: CANCELLED | OUTPATIENT
Start: 2023-10-28

## 2023-10-27 RX ORDER — ACETAMINOPHEN 650 MG/1
650 SUPPOSITORY RECTAL EVERY 6 HOURS PRN
Status: CANCELLED | OUTPATIENT
Start: 2023-10-27

## 2023-10-27 RX ORDER — GLUCAGON 1 MG/ML
1 KIT INJECTION PRN
Status: CANCELLED | OUTPATIENT
Start: 2023-10-27

## 2023-10-27 RX ORDER — ASPIRIN 81 MG/1
81 TABLET, CHEWABLE ORAL DAILY
Status: CANCELLED | OUTPATIENT
Start: 2023-10-28

## 2023-10-27 RX ORDER — ATORVASTATIN CALCIUM 10 MG/1
20 TABLET, FILM COATED ORAL NIGHTLY
Status: CANCELLED | OUTPATIENT
Start: 2023-10-27

## 2023-10-27 RX ORDER — ZINC OXIDE
OINTMENT (GRAM) TOPICAL 4 TIMES DAILY PRN
Status: DISCONTINUED | OUTPATIENT
Start: 2023-10-27 | End: 2023-10-28 | Stop reason: HOSPADM

## 2023-10-27 RX ORDER — ONDANSETRON 2 MG/ML
4 INJECTION INTRAMUSCULAR; INTRAVENOUS EVERY 6 HOURS PRN
Status: CANCELLED | OUTPATIENT
Start: 2023-10-27

## 2023-10-27 RX ORDER — SODIUM CHLORIDE 0.9 % (FLUSH) 0.9 %
5-40 SYRINGE (ML) INJECTION PRN
Status: CANCELLED | OUTPATIENT
Start: 2023-10-27

## 2023-10-27 RX ORDER — CARVEDILOL 25 MG/1
25 TABLET ORAL 2 TIMES DAILY WITH MEALS
Status: CANCELLED | OUTPATIENT
Start: 2023-10-27

## 2023-10-27 RX ORDER — INSULIN LISPRO 100 [IU]/ML
0-4 INJECTION, SOLUTION INTRAVENOUS; SUBCUTANEOUS NIGHTLY
Status: CANCELLED | OUTPATIENT
Start: 2023-10-27

## 2023-10-27 RX ORDER — HEPARIN SODIUM 5000 [USP'U]/ML
5000 INJECTION, SOLUTION INTRAVENOUS; SUBCUTANEOUS EVERY 8 HOURS SCHEDULED
Status: CANCELLED | OUTPATIENT
Start: 2023-10-27

## 2023-10-27 RX ORDER — ZINC OXIDE
OINTMENT (GRAM) TOPICAL 4 TIMES DAILY PRN
Status: CANCELLED | OUTPATIENT
Start: 2023-10-27

## 2023-10-27 RX ORDER — INSULIN GLARGINE 100 [IU]/ML
10 INJECTION, SOLUTION SUBCUTANEOUS DAILY
Status: DISCONTINUED | OUTPATIENT
Start: 2023-10-27 | End: 2023-10-28 | Stop reason: HOSPADM

## 2023-10-27 RX ORDER — INSULIN LISPRO 100 [IU]/ML
3 INJECTION, SOLUTION INTRAVENOUS; SUBCUTANEOUS
Status: DISCONTINUED | OUTPATIENT
Start: 2023-10-27 | End: 2023-10-28 | Stop reason: HOSPADM

## 2023-10-27 RX ORDER — AMLODIPINE BESYLATE 10 MG/1
10 TABLET ORAL DAILY
Status: CANCELLED | OUTPATIENT
Start: 2023-10-28

## 2023-10-27 RX ORDER — ACETAMINOPHEN 325 MG/1
650 TABLET ORAL EVERY 6 HOURS PRN
Status: CANCELLED | OUTPATIENT
Start: 2023-10-27

## 2023-10-27 RX ORDER — LEVOTHYROXINE SODIUM 0.07 MG/1
75 TABLET ORAL DAILY
Status: CANCELLED | OUTPATIENT
Start: 2023-10-28

## 2023-10-27 RX ORDER — ONDANSETRON 4 MG/1
4 TABLET, ORALLY DISINTEGRATING ORAL EVERY 8 HOURS PRN
Status: CANCELLED | OUTPATIENT
Start: 2023-10-27

## 2023-10-27 RX ORDER — DEXTROSE MONOHYDRATE 100 MG/ML
INJECTION, SOLUTION INTRAVENOUS CONTINUOUS PRN
Status: CANCELLED | OUTPATIENT
Start: 2023-10-27

## 2023-10-27 RX ORDER — INSULIN GLARGINE 100 [IU]/ML
10 INJECTION, SOLUTION SUBCUTANEOUS EVERY EVENING
Status: CANCELLED | OUTPATIENT
Start: 2023-10-27

## 2023-10-27 RX ORDER — INSULIN LISPRO 100 [IU]/ML
0-4 INJECTION, SOLUTION INTRAVENOUS; SUBCUTANEOUS
Status: CANCELLED | OUTPATIENT
Start: 2023-10-27

## 2023-10-27 RX ADMIN — Medication: at 20:57

## 2023-10-27 RX ADMIN — AMLODIPINE BESYLATE 10 MG: 10 TABLET ORAL at 08:22

## 2023-10-27 RX ADMIN — MEROPENEM 500 MG: 500 INJECTION, POWDER, FOR SOLUTION INTRAVENOUS at 00:41

## 2023-10-27 RX ADMIN — ATORVASTATIN CALCIUM 20 MG: 10 TABLET, FILM COATED ORAL at 20:55

## 2023-10-27 RX ADMIN — HEPARIN SODIUM 5000 UNITS: 5000 INJECTION INTRAVENOUS; SUBCUTANEOUS at 14:58

## 2023-10-27 RX ADMIN — PANTOPRAZOLE SODIUM 40 MG: 40 TABLET, DELAYED RELEASE ORAL at 06:25

## 2023-10-27 RX ADMIN — SODIUM ZIRCONIUM CYCLOSILICATE 10 G: 10 POWDER, FOR SUSPENSION ORAL at 20:55

## 2023-10-27 RX ADMIN — INSULIN GLARGINE 10 UNITS: 100 INJECTION, SOLUTION SUBCUTANEOUS at 16:36

## 2023-10-27 RX ADMIN — HEPARIN SODIUM 5000 UNITS: 5000 INJECTION INTRAVENOUS; SUBCUTANEOUS at 06:25

## 2023-10-27 RX ADMIN — SODIUM ZIRCONIUM CYCLOSILICATE 10 G: 10 POWDER, FOR SUSPENSION ORAL at 16:36

## 2023-10-27 RX ADMIN — HEPARIN SODIUM 5000 UNITS: 5000 INJECTION INTRAVENOUS; SUBCUTANEOUS at 20:56

## 2023-10-27 RX ADMIN — SODIUM CHLORIDE, PRESERVATIVE FREE 10 ML: 5 INJECTION INTRAVENOUS at 20:56

## 2023-10-27 RX ADMIN — MEROPENEM 500 MG: 500 INJECTION, POWDER, FOR SOLUTION INTRAVENOUS at 14:56

## 2023-10-27 RX ADMIN — LEVOTHYROXINE SODIUM 75 MCG: 0.07 TABLET ORAL at 06:25

## 2023-10-27 RX ADMIN — INSULIN LISPRO 2 UNITS: 100 INJECTION, SOLUTION INTRAVENOUS; SUBCUTANEOUS at 16:36

## 2023-10-27 RX ADMIN — SODIUM CHLORIDE, PRESERVATIVE FREE 10 ML: 5 INJECTION INTRAVENOUS at 08:23

## 2023-10-27 RX ADMIN — CARVEDILOL 25 MG: 25 TABLET, FILM COATED ORAL at 08:23

## 2023-10-27 RX ADMIN — CARVEDILOL 25 MG: 25 TABLET, FILM COATED ORAL at 16:36

## 2023-10-27 RX ADMIN — ASPIRIN 81 MG CHEWABLE TABLET 81 MG: 81 TABLET CHEWABLE at 08:22

## 2023-10-27 ASSESSMENT — PAIN SCALES - GENERAL
PAINLEVEL_OUTOF10: 0
PAINLEVEL_OUTOF10: 0

## 2023-10-27 ASSESSMENT — PAIN SCALES - WONG BAKER
WONGBAKER_NUMERICALRESPONSE: 0
WONGBAKER_NUMERICALRESPONSE: 0

## 2023-10-27 NOTE — CARE COORDINATION
Pt is new to this CM today. Noted that Ghazal is following for updated PT/OT notes. Requested updated PT/OT notes via wb. Pt will not go to a SNF. Pt to go home with HC if denied for ARU.   TE

## 2023-10-27 NOTE — CARE COORDINATION
Waiting for decision from ARU to accept or not. Pt is ready for d/c per Dr. Luiz Lema [facility] liaison/Sandy to go ahead and order the IV atb supplies in case pt is denied to go to ARU.   TE

## 2023-10-27 NOTE — CARE COORDINATION
Patient meets criteria and is approved to come to ARU. Patient able to admit once medically stable and after ARU Medical Director and  sign the pre-admission screen (PAS). Notified MD and JILLIAN.

## 2023-10-27 NOTE — PROGRESS NOTES
Physical Therapy    Physical Therapy Treatment Note  Name: Dre Israel MRN: [id number] :   1940   Date:  10/27/2023   Admission Date: 10/23/2023 Room:  [address] [place] [address]   Restrictions/Precautions:          general precautions, fall risk, contact isolation, suprapubic catheter  Communication with other providers:  per chart review pt is appropriate for tx  Subjective:  Patient states:  pt motivated and agreeable to tx. Pt states during amb \" It feels good to be up on my feet\"  Pain:   Location, Type, Intensity (0/10 to 10/10):  pt c/o bladder spasm during gt  Objective:    Observation:  alert and oriented     Treatment, including education/measures:  Sit<=>stand mod assist and cues for hand placement  Amb with rw min assist 20' x 2. Pt had bladder spasm during first bout of amb needing to stop and take standing rest until it passed but was very unsafe bending over in pain. Ex in sitting:  Trunk stretches with deep breathing but reports bad shoulder and limited AROM. 10 reps aps  10 reps laqs  Safety  Patient left safely in the chair, with call light/phone in reach with alarm applied. Gait belt was used for transfers and gait. Assessment / Impression:      Patient's tolerance of treatment:  good   Adverse Reaction: na  Significant change in status and impact:  na  Barriers to improvement:  strength and safety  Plan for Next Session:    Cont.  POC  Time in:  1140  Time out:  1210  Timed treatment minutes:  30  Total treatment time:  30    Previously filed items:  Social/Functional History  Lives With: Son  Type of Home: House  Home Layout: One level  Home Access: Level entry  Home Equipment: Walker, rolling  ADL Assistance: Needs assistance  Homemaking Assistance: Needs assistance  Homemaking Responsibilities: No  Ambulation Assistance: Independent  Transfer Assistance: Independent        Short Term Goals  Time Frame for Short Term Goals: 1 week  Short Term Goal 1: pt to complete all bed mobility SBA  Short

## 2023-10-27 NOTE — CARE COORDINATION
Pt is approved for ARU per Avani/MILI. She will notify Dr Stephanie Younger and pt's son. Notified Sandy that pt is going to ARU.   TE

## 2023-10-27 NOTE — PROGRESS NOTES
Occupational Therapy    Occupational Therapy Treatment Note    Name: Jluis Myers MRN: [id number] :   1940   Date:  10/27/2023   Admission Date: 10/23/2023 Room:  [facility]     Primary Problem:  The encounter diagnosis was Urinary tract infection associated with catheterization of urinary tract, unspecified indwelling urinary catheter type, initial encounter ([address] Restrictions/Precautions: General Precautions, Fall Risk, contact isolation, suprapubic catheter    Communication with other providers: RN approved session    Subjective:  Patient states:  Pt agreeable to therapy session  Pain:   Location, Type, Intensity (0/10 to 10/10):  denies pain endorses soreness    Objective:    Observation: Pt supine in bed upon arrival.   Objective Measures:  Pt alert and oriented. Treatment, including education:  Self Care Training:   Cues were given for safety, sequence, UE/LE placement, visual cues, and balance. Activities performed today included UB/LB dressing tasks, toileting, hand hygiene at sink    Pt supine in bed upon arrival and agreeable to therapy session. Pt completed supine to sit with head of bed elevated and MIN to MOD A with increased time to scoot to edge of bed. Pt completed sit to stand from edge of bed with set up and [address] and CGA. Pt completed functional mobility from bed to bathroom with WW and CGA completed toilet transfer to [facility] with CGA. Pt seated and doffed gown with SBA and completed upper body bathing and dressing with SBA. Pt completed lower body dressing to doff and kang socks with set up and DEP A as patient is noted to use sock aid and reacher baseline. Pt completed lower body bathing with MOD to MAX A. Pt completed donning of undergarment with MAX A and completed sit to stand from [facility] with MIN A and [address]. Pt completed functional transfer to sink and completed oral hygiene with set up and close SBA. Pt completed functional mobility to chair with set up and SBA.  Pt needs met

## 2023-10-27 NOTE — PROGRESS NOTES
Patient was unable to be discharged due to hyperkalemia of 6. Sheridanma ordered and repeat BMP. Patient is already on telemetry.   Possible discharge tomorrow

## 2023-10-27 NOTE — CONSULTS
Consult completed. Nexiva 20g 1.75 inch peripheral IV initiated into right forearm x 1 attempt with ultrasound guidance. Brisk blood return, flushes without resistance. Patient tolerated well. [facility]. Please consult IV/PICC team if patient's needs change, questions, or concerns. Render Post-Care Instructions In Note?: yes Detail Level: Zone Render Note In Bullet Format When Appropriate: No Treatment Number (Will Not Render If 0): 0 Post-Care Instructions: I reviewed with the patient in detail post-care instructions. Patient is to wear sunprotection, and avoid picking at any of the treated lesions. Pt may apply Vaseline to crusted or scabbing areas. Anesthesia Volume In Cc: 0.4 Medical Necessity Clause: This procedure was medically necessary because the lesions that were treated were: Consent: The patient's consent was obtained including but not limited to risks of crusting, scabbing, blistering, scarring, darker or lighter pigmentary change, recurrence, incomplete removal and infection. Medical Necessity Information: It is in your best interest to select a reason for this procedure from the list below. All of these items fulfill various CMS LCD requirements except the new and changing color options.

## 2023-10-27 NOTE — PROGRESS NOTES
Physician Progress Note      PATIENT:               Mathew Navas  CSN #:                  [id number]  :                       1940  ADMIT DATE:       10/23/2023 5:13 PM  [facility] DATE:  RESPONDING  PROVIDER #:        Anny John MD          QUERY TEXT:    Pt admitted with UTI. Pt noted to have Suprapubic catheter, exchanged   10/19/2023. Noted in the ED documentation \"UTI associated with cauterization   of urinary tract with indwelling catheter\". If possible, please document in   the progress notes and discharge summary if you are evaluating and/or treating   any of the following: The medical record reflects the following:  Risk Factors: Suprapubic catheter, exchanged 10/19/2023, Pyelonephritis, PAMELLA,  Clinical Indicators: nitrite positive, large leuks, 48 RBC, 841 WBC. Culture   sent, CT abdomen pelvis with right hydroureter/hydronephrosis and perinephric   stranding, no obstructing stone. Attending progress note on 10/26, \"ID   recommends changing left ureteral stent due to likely colonization with ESBL   E. Coli. \"  Treatment: Rocephin in the ED and LR 1000  IV bolus, IV meropenem, Consult   urology, Monitor BMP, CT abdomen, ID consult    Thank you, Catarino Lyman RN, CDS [phone]  Options provided:  -- UTI POA due to suprapubic catheter  -- UTI POA due to ureteral stent  -- UTI POA not related to device  -- Other - I will add my own diagnosis  -- Disagree - Not applicable / Not valid  -- Disagree - Clinically unable to determine / Unknown  -- Refer to Clinical Documentation Reviewer    PROVIDER RESPONSE TEXT:    UTI POA is due to ureteral stent.     Query created by: Catarino Lyman on 10/24/2023 11:37 AM      Electronically signed by:  Anny John MD 10/27/2023 6:21 PM

## 2023-10-27 NOTE — CARE COORDINATION
Reviewed clinicals and therapy notes. Met with patient and discussed ARU. Explained to patient the required 3 hours of therapy a day. Also explained the average length of stay is 11 days, could be longer or shorter depending on recommendations of therapy and Dr. Silvestre Bauman. Patient expresses his understanding and states he's agreeable to admit to ARU. Per patient and family goal is to return home with his son at discharge from ARU. Per patient and family prefer not to have IV abx at home. Patient and son mention that patient is to have a procedure on Tuesday to exchange his suprapubic cath and place a stent. Presented information to Dr. Silvestre Bauman. Dr. Silvestre Bauman asked if procedure could be rescheduled. PS'd Urology PA who states \"His surgery needs to be done while on IV abx is the issue with rescheduling. And that was the soonest we could get it do. \"    PS'd Dr. Silvestre Bauman with information. Awaiting response.

## 2023-10-27 NOTE — PROGRESS NOTES
10/27/23 1607   Encounter Summary   Encounter Overview/Reason  Initial Encounter   Service Provided For: Patient   Referral/Consult From: [facility]   Support System Children   Last Encounter  10/27/23   Complexity of Encounter Low   Begin Time 1500   End Time  1510  (Patient did not express any needs at this time)   Total Time Calculated 10 min   Spiritual/Emotional needs   Type Spiritual Support   Assessment/Intervention/Outcome   Assessment Unable to assess   Intervention Sustaining Presence/Ministry of presence   Plan and Referrals   Plan/Referrals Continue to visit, (comment)

## 2023-10-28 ENCOUNTER — HOSPITAL ENCOUNTER (INPATIENT)
Age: 83
DRG: 939 | End: 2023-10-28
Attending: PHYSICAL MEDICINE & REHABILITATION | Admitting: PHYSICAL MEDICINE & REHABILITATION
Payer: MEDICARE

## 2023-10-28 VITALS
TEMPERATURE: 98.1 F | HEIGHT: 68 IN | OXYGEN SATURATION: 97 % | HEART RATE: 68 BPM | BODY MASS INDEX: 29.6 KG/M2 | RESPIRATION RATE: 20 BRPM | DIASTOLIC BLOOD PRESSURE: 67 MMHG | WEIGHT: 195.33 LBS | SYSTOLIC BLOOD PRESSURE: 149 MMHG

## 2023-10-28 PROBLEM — K59.04 CHRONIC IDIOPATHIC CONSTIPATION: Status: ACTIVE | Noted: 2023-10-28

## 2023-10-28 PROBLEM — I50.42 CHRONIC COMBINED SYSTOLIC (CONGESTIVE) AND DIASTOLIC (CONGESTIVE) HEART FAILURE (HCC): Status: ACTIVE | Noted: 2023-10-28

## 2023-10-28 LAB
BACTERIA: NEGATIVE /HPF
BILIRUBIN URINE: NEGATIVE
BLOOD, URINE: NORMAL
CLARITY: NORMAL
COLOR: YELLOW
CREAT UR-MCNC: 60.7 MG/DL (ref 39–259)
CULTURE: NORMAL
CULTURE: NORMAL
GLUCOSE BLD-MCNC: 179 MG/DL (ref 70–99)
GLUCOSE BLD-MCNC: 222 MG/DL (ref 70–99)
GLUCOSE BLD-MCNC: 255 MG/DL (ref 70–99)
GLUCOSE BLD-MCNC: 288 MG/DL (ref 70–99)
GLUCOSE, URINE: 500 MG/DL
KETONES, URINE: NEGATIVE MG/DL
LEUKOCYTE ESTERASE, URINE: NORMAL
Lab: NORMAL
Lab: NORMAL
NITRITE URINE, QUANTITATIVE: NEGATIVE
PH, URINE: 7
PROTEIN UA: 100 MG/DL
RBC URINE: 58 /HPF
SPECIFIC GRAVITY UA: 1.01
SPECIMEN: NORMAL
SPECIMEN: NORMAL
SQUAMOUS EPITHELIAL: 3 /HPF
TRICHOMONAS: NORMAL /HPF
URINE TOTAL PROTEIN: 136 MG/DL
UROBILINOGEN, URINE: 0.2 MG/DL
WBC UA: 978 /HPF

## 2023-10-28 PROCEDURE — 6370000000 HC RX 637 (ALT 250 FOR IP): Performed by: INTERNAL MEDICINE

## 2023-10-28 PROCEDURE — 99223 1ST HOSP IP/OBS HIGH 75: CPT | Performed by: PHYSICAL MEDICINE & REHABILITATION

## 2023-10-28 PROCEDURE — 97535 SELF CARE MNGMENT TRAINING: CPT

## 2023-10-28 PROCEDURE — 94761 N-INVAS EAR/PLS OXIMETRY MLT: CPT

## 2023-10-28 PROCEDURE — 84156 ASSAY OF PROTEIN URINE: CPT

## 2023-10-28 PROCEDURE — 97167 OT EVAL HIGH COMPLEX 60 MIN: CPT

## 2023-10-28 PROCEDURE — 2580000003 HC RX 258: Performed by: STUDENT IN AN ORGANIZED HEALTH CARE EDUCATION/TRAINING PROGRAM

## 2023-10-28 PROCEDURE — 82570 ASSAY OF URINE CREATININE: CPT

## 2023-10-28 PROCEDURE — 1280000000 HC REHAB R&B

## 2023-10-28 PROCEDURE — 6370000000 HC RX 637 (ALT 250 FOR IP): Performed by: NURSE PRACTITIONER

## 2023-10-28 PROCEDURE — 6360000002 HC RX W HCPCS: Performed by: STUDENT IN AN ORGANIZED HEALTH CARE EDUCATION/TRAINING PROGRAM

## 2023-10-28 PROCEDURE — 81001 URINALYSIS AUTO W/SCOPE: CPT

## 2023-10-28 PROCEDURE — 6370000000 HC RX 637 (ALT 250 FOR IP): Performed by: STUDENT IN AN ORGANIZED HEALTH CARE EDUCATION/TRAINING PROGRAM

## 2023-10-28 PROCEDURE — 94664 DEMO&/EVAL PT USE INHALER: CPT

## 2023-10-28 PROCEDURE — 6360000002 HC RX W HCPCS: Performed by: INTERNAL MEDICINE

## 2023-10-28 PROCEDURE — 2580000003 HC RX 258: Performed by: INTERNAL MEDICINE

## 2023-10-28 PROCEDURE — 82962 GLUCOSE BLOOD TEST: CPT

## 2023-10-28 PROCEDURE — 97163 PT EVAL HIGH COMPLEX 45 MIN: CPT

## 2023-10-28 PROCEDURE — 94150 VITAL CAPACITY TEST: CPT

## 2023-10-28 RX ORDER — ATORVASTATIN CALCIUM 10 MG/1
20 TABLET, FILM COATED ORAL NIGHTLY
Status: DISCONTINUED | OUTPATIENT
Start: 2023-10-28 | End: 2023-11-09 | Stop reason: HOSPADM

## 2023-10-28 RX ORDER — SODIUM CHLORIDE 0.9 % (FLUSH) 0.9 %
5-40 SYRINGE (ML) INJECTION EVERY 12 HOURS SCHEDULED
Status: DISCONTINUED | OUTPATIENT
Start: 2023-10-28 | End: 2023-11-09 | Stop reason: HOSPADM

## 2023-10-28 RX ORDER — INSULIN LISPRO 100 [IU]/ML
0-4 INJECTION, SOLUTION INTRAVENOUS; SUBCUTANEOUS NIGHTLY
Status: DISCONTINUED | OUTPATIENT
Start: 2023-10-28 | End: 2023-11-09

## 2023-10-28 RX ORDER — SODIUM CHLORIDE 0.9 % (FLUSH) 0.9 %
5-40 SYRINGE (ML) INJECTION PRN
Status: DISCONTINUED | OUTPATIENT
Start: 2023-10-28 | End: 2023-11-09 | Stop reason: HOSPADM

## 2023-10-28 RX ORDER — LEVOTHYROXINE SODIUM 0.07 MG/1
75 TABLET ORAL DAILY
Status: DISCONTINUED | OUTPATIENT
Start: 2023-10-29 | End: 2023-11-09 | Stop reason: HOSPADM

## 2023-10-28 RX ORDER — PANTOPRAZOLE SODIUM 40 MG/1
40 TABLET, DELAYED RELEASE ORAL
Status: DISCONTINUED | OUTPATIENT
Start: 2023-10-29 | End: 2023-11-09 | Stop reason: HOSPADM

## 2023-10-28 RX ORDER — ACETAMINOPHEN 325 MG/1
650 TABLET ORAL EVERY 6 HOURS PRN
Status: DISCONTINUED | OUTPATIENT
Start: 2023-10-28 | End: 2023-11-09 | Stop reason: HOSPADM

## 2023-10-28 RX ORDER — ACETAMINOPHEN 650 MG/1
650 SUPPOSITORY RECTAL EVERY 6 HOURS PRN
Status: DISCONTINUED | OUTPATIENT
Start: 2023-10-28 | End: 2023-10-28

## 2023-10-28 RX ORDER — HEPARIN SODIUM 5000 [USP'U]/ML
5000 INJECTION, SOLUTION INTRAVENOUS; SUBCUTANEOUS EVERY 8 HOURS SCHEDULED
Status: DISCONTINUED | OUTPATIENT
Start: 2023-10-28 | End: 2023-11-09

## 2023-10-28 RX ORDER — GLUCAGON 1 MG/ML
1 KIT INJECTION PRN
Status: DISCONTINUED | OUTPATIENT
Start: 2023-10-28 | End: 2023-11-09 | Stop reason: HOSPADM

## 2023-10-28 RX ORDER — ZINC OXIDE 20 %
OINTMENT (GRAM) TOPICAL 4 TIMES DAILY PRN
Status: DISCONTINUED | OUTPATIENT
Start: 2023-10-28 | End: 2023-11-09 | Stop reason: HOSPADM

## 2023-10-28 RX ORDER — ASPIRIN 81 MG/1
81 TABLET, CHEWABLE ORAL DAILY
Status: DISCONTINUED | OUTPATIENT
Start: 2023-10-29 | End: 2023-11-09 | Stop reason: HOSPADM

## 2023-10-28 RX ORDER — INSULIN LISPRO 100 [IU]/ML
0-4 INJECTION, SOLUTION INTRAVENOUS; SUBCUTANEOUS
Status: DISCONTINUED | OUTPATIENT
Start: 2023-10-28 | End: 2023-11-09

## 2023-10-28 RX ORDER — CARVEDILOL 25 MG/1
25 TABLET ORAL 2 TIMES DAILY WITH MEALS
Status: DISCONTINUED | OUTPATIENT
Start: 2023-10-28 | End: 2023-11-09 | Stop reason: HOSPADM

## 2023-10-28 RX ORDER — BISACODYL 10 MG
10 SUPPOSITORY, RECTAL RECTAL DAILY PRN
Status: DISCONTINUED | OUTPATIENT
Start: 2023-10-28 | End: 2023-11-09 | Stop reason: HOSPADM

## 2023-10-28 RX ORDER — DEXTROSE MONOHYDRATE 100 MG/ML
INJECTION, SOLUTION INTRAVENOUS CONTINUOUS PRN
Status: DISCONTINUED | OUTPATIENT
Start: 2023-10-28 | End: 2023-11-09 | Stop reason: HOSPADM

## 2023-10-28 RX ORDER — AMLODIPINE BESYLATE 10 MG/1
10 TABLET ORAL DAILY
Status: DISCONTINUED | OUTPATIENT
Start: 2023-10-29 | End: 2023-11-09 | Stop reason: HOSPADM

## 2023-10-28 RX ORDER — ONDANSETRON 4 MG/1
4 TABLET, ORALLY DISINTEGRATING ORAL EVERY 8 HOURS PRN
Status: DISCONTINUED | OUTPATIENT
Start: 2023-10-28 | End: 2023-11-09 | Stop reason: HOSPADM

## 2023-10-28 RX ORDER — ONDANSETRON 2 MG/ML
4 INJECTION INTRAMUSCULAR; INTRAVENOUS EVERY 6 HOURS PRN
Status: DISCONTINUED | OUTPATIENT
Start: 2023-10-28 | End: 2023-11-09

## 2023-10-28 RX ADMIN — INSULIN GLARGINE 10 UNITS: 100 INJECTION, SOLUTION SUBCUTANEOUS at 08:51

## 2023-10-28 RX ADMIN — SODIUM CHLORIDE, PRESERVATIVE FREE 5 ML: 5 INJECTION INTRAVENOUS at 22:42

## 2023-10-28 RX ADMIN — ATORVASTATIN CALCIUM 20 MG: 10 TABLET, FILM COATED ORAL at 22:35

## 2023-10-28 RX ADMIN — MEROPENEM 500 MG: 500 INJECTION, POWDER, FOR SOLUTION INTRAVENOUS at 13:48

## 2023-10-28 RX ADMIN — MEROPENEM 500 MG: 500 INJECTION, POWDER, FOR SOLUTION INTRAVENOUS at 01:09

## 2023-10-28 RX ADMIN — INSULIN LISPRO 2 UNITS: 100 INJECTION, SOLUTION INTRAVENOUS; SUBCUTANEOUS at 17:21

## 2023-10-28 RX ADMIN — PANTOPRAZOLE SODIUM 40 MG: 40 TABLET, DELAYED RELEASE ORAL at 06:29

## 2023-10-28 RX ADMIN — ASPIRIN 81 MG CHEWABLE TABLET 81 MG: 81 TABLET CHEWABLE at 08:50

## 2023-10-28 RX ADMIN — CARVEDILOL 25 MG: 25 TABLET, FILM COATED ORAL at 08:50

## 2023-10-28 RX ADMIN — HEPARIN SODIUM 5000 UNITS: 5000 INJECTION INTRAVENOUS; SUBCUTANEOUS at 06:30

## 2023-10-28 RX ADMIN — HEPARIN SODIUM 5000 UNITS: 5000 INJECTION INTRAVENOUS; SUBCUTANEOUS at 22:35

## 2023-10-28 RX ADMIN — AMLODIPINE BESYLATE 10 MG: 10 TABLET ORAL at 08:50

## 2023-10-28 RX ADMIN — HEPARIN SODIUM 5000 UNITS: 5000 INJECTION INTRAVENOUS; SUBCUTANEOUS at 13:12

## 2023-10-28 RX ADMIN — INSULIN LISPRO 2 UNITS: 100 INJECTION, SOLUTION INTRAVENOUS; SUBCUTANEOUS at 13:19

## 2023-10-28 RX ADMIN — Medication: at 01:08

## 2023-10-28 RX ADMIN — LEVOTHYROXINE SODIUM 75 MCG: 0.07 TABLET ORAL at 06:29

## 2023-10-28 RX ADMIN — CARVEDILOL 25 MG: 25 TABLET, FILM COATED ORAL at 17:22

## 2023-10-28 ASSESSMENT — PAIN SCALES - WONG BAKER
WONGBAKER_NUMERICALRESPONSE: 0
WONGBAKER_NUMERICALRESPONSE: 0

## 2023-10-28 ASSESSMENT — PAIN SCALES - GENERAL
PAINLEVEL_OUTOF10: 0
PAINLEVEL_OUTOF10: 0

## 2023-10-28 NOTE — PLAN OF CARE
mobility with self-care instruction and adaptive equipment training. Caregiver education will be offered. Expected length of stay  prior to a supervised level of function for discharge home with a walker and [facility] OT/PT is 2 weeks. Additional recommendation:     Sepsis with gait disturbance: Patient requires daily occupational and physical therapy. With his generalized weakness, poor balance and altered sensation, he is at risk for fall with injury during standing ADLs and mobility activities. Therefore, we must provide him adaptive equipment training with strengthening exercises, balance retraining and conditioning development. We was provide aggressive pulmonary hygiene measures, nutritional support and work on bowel and bladder retraining as possible with his chronic catheter. Providing cautious pain management. Planning caregiver training with his spouse. Outpatient follow-up with his primary care physician. DVT prophylaxis: Continuing with heparin 5000 units every 8 hours. This raises his risk for spontaneous hemorrhage and GI bleeding. Therefore, I must monitor his hemoglobin and platelet count regularly. Providing GI prophylaxis with a statin. Acute pyelonephritis: Continuing meropenem through 11/3/2023. Encouraging consistent oral hydration. Regular topical hygiene about the catheter site. Urology is planning a catheter exchange and possible stent placement this coming Tuesday. Uncontrolled diabetes type 2 with hyperglycemia: Continuing a diet modified for carbohydrates. Continuing Lantus 10 units nightly as well as providing a Humalog sliding scale. Checking blood sugars at mealtime and bedtime. Chronic combined congestive heart failure: Daily weights to detect any decompensation. Continuing afterload reduction with Coreg but holding off on diuresis due to his limited oral intake and lower blood pressures. Continuing Norvasc with parameters for blood pressure regulation.   He is on antiplatelet therapy with a baby aspirin and will need to continue his statin. Hyperkalemia: Encouraging consistent oral hydration. Holding off with diuretic therapy at the moment but this may be reasonable strategy once his blood pressure becomes more consistent. Periodic monitoring of his chemistries. Essential hypertension: Continue Norvasc and Coreg for blood pressure regulation. Target systolic blood pressures [phone]. Vital signs are checked at rest and with activity. Anticipated discharge destination:    [] Home Independently   [x] Home with supervision    []SNF     [] Other       This plan has been reviewed with me in a language I understand.  I have had the opportunity to include my input with my therapy team.    ________________________________________________   ______________________  Patient/Significant Other      Date    I have reviewed this initial plan of care and agree with its contents:    Title   Name    Date    Time    Physician: Jacy Fields MD 10/30/2023 9:57 PM    Case Mgmt: Erikamanny Tennille MSW LSW 10/30/2023 9:14 AM     OT: Kat Jay MS, OTR/L 10/28/2023 [address], PT for Haven Schwab PT who filed evaluation 10/28 at 21 [phone]    RN: Justo Holloway, ISHMAEL    ST:    Dietician:     ADMIT DATE:10/28/2023

## 2023-10-28 NOTE — H&P
the rehab unit. I have reviewed the preadmission screening and concur with its findings without change. A detailed plan of care will be established by hospital day 4 and I attest the patient is appropriate for inpatient rehabilitation at this time. I have compared the patient's current functional status noted during my history and physical with that of the preadmission screen and I have found no significant differences.
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
## 2023-10-28 NOTE — PROGRESS NOTES
Nephrology Progress Note  10/28/2023 11:24 AM        Subjective:   Admit Date: 10/28/2023  PCP: Nicolas Xie MD    Interval History: Patient seen earlier today  Complaining of some back pain mainly from laying down in the bed  By the time I am finishing up the note he has been transferred to acute rehab unit which was the plan    Diet: Reasonable    ROS: Some back pain but no shortness of breath or dysuria  No overt confusion      Data:     Current meds:    insulin lispro  0-4 Units SubCUTAneous TID WC    insulin lispro  0-4 Units SubCUTAneous Nightly    heparin (porcine)  5,000 Units SubCUTAneous 3 times per day    sodium chloride flush  5-40 mL IntraVENous 2 times per day      dextrose           No intake/output data recorded. CBC: No results for input(s): \"WBC\", \"HGB\", \"PLT\" in the last 72 hours. Recent Labs     10/27/23  0103 10/27/23  1502 10/27/23  1819    136 136   K 5.1 6.0* 4.8    103 102   CO2 22 23 24   BUN 29* 27* 27*   CREATININE 2.0* 2.0* 2.0*   GLUCOSE 203* 325* 203*       Lab Results   Component Value Date    CALCIUM 9.1 10/27/2023    PHOS 3.0 10/27/2023       Objective:     Vitals:  There were no vitals taken for this visit.,    General appearance: He was alert, awake and oriented  HEENT: 1+ conjunctival pallor  Neck: Supple  Lungs: No crackles on auscultation  Heart: Seemed regular rate and rhythm this morning  Abdomen: Slightly distended but otherwise soft, nontender  Extremities: Trace bilateral leg edema, he has a Hopper catheter      Problem List :         Impression :     Acute kidney injury with underlying chronic kidney disease stage G3 B A3-stable thought to be from genitourinary infection  Recurrent genitourinary infection with indwelling ureteral stent-for ureteral obstruction of unknown etiology could have been from scar tissue  Multiple other chronic condition    Recommendation/Plan  :     He was transferred to acute rehab unit-I will follow him up there-we will watch for iatrogenic and nosocomial complication-he unfortunately remains at risk for infection-optimize medical therapy for other chronic condition-follow clinically and periodically biochemically      Yun Sher MD MD
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
## 2023-10-28 NOTE — PLAN OF CARE
Problem: Discharge Planning  Goal: Discharge to home or other facility with appropriate resources  Outcome: Completed  Flowsheets (Taken 10/27/2023 2100 by Charo Gutierrez RN)  Discharge to home or other facility with appropriate resources:   Identify barriers to discharge with patient and caregiver   Identify discharge learning needs (meds, wound care, etc)     Problem: Pain  Goal: Verbalizes/displays adequate comfort level or baseline comfort level  Outcome: Completed     Problem: Skin/Tissue Integrity  Goal: Absence of new skin breakdown  Description: 1. Monitor for areas of redness and/or skin breakdown  2. Assess vascular access sites hourly  3. Every 4-6 hours minimum:  Change oxygen saturation probe site  4. Every 4-6 hours:  If on nasal continuous positive airway pressure, respiratory therapy assess nares and determine need for appliance change or resting period.   Outcome: Completed     Problem: ABCDS Injury Assessment  Goal: Absence of physical injury  Outcome: Completed     Problem: Chronic Conditions and Co-morbidities  Goal: Patient's chronic conditions and co-morbidity symptoms are monitored and maintained or improved  Outcome: Completed     Problem: Safety - Adult  Goal: Free from fall injury  Outcome: Completed

## 2023-10-28 NOTE — PLAN OF CARE
Problem: Discharge Planning  Goal: Discharge to home or other facility with appropriate resources  Outcome: Progressing  Flowsheets (Taken 10/28/2023 1359)  Discharge to home or other facility with appropriate resources:   Identify barriers to discharge with patient and caregiver   Arrange for needed discharge resources and transportation as appropriate   Identify discharge learning needs (meds, wound care, etc)   Refer to discharge planning if patient needs post-hospital services based on physician order or complex needs related to functional status, cognitive ability or social support system     Problem: Safety - Adult  Goal: Free from fall injury  Outcome: Progressing  Flowsheets (Taken 10/28/2023 1350)  Free From Fall Injury: Instruct family/caregiver on patient safety     Problem: ABCDS Injury Assessment  Goal: Absence of physical injury  Outcome: Progressing  Flowsheets (Taken 10/28/2023 1350)  Absence of Physical Injury: Implement safety measures based on patient assessment     Problem: Pain  Goal: Verbalizes/displays adequate comfort level or baseline comfort level  Outcome: Progressing  Flowsheets (Taken 10/28/2023 1325)  Verbalizes/displays adequate comfort level or baseline comfort level:   Encourage patient to monitor pain and request assistance   Assess pain using appropriate pain scale   Administer analgesics based on type and severity of pain and evaluate response   Implement non-pharmacological measures as appropriate and evaluate response   Consider cultural and social influences on pain and pain management   Notify Licensed Independent Practitioner if interventions unsuccessful or patient reports new pain

## 2023-10-28 NOTE — PROGRESS NOTES
Patient instructed and educated on [employer]. Patient able to do 1500 ml. Vital capacity. Patient's goal is 2500ml.  Electronically signed by Melinda Strong RCP on 10/28/2023 at 4:07 PM

## 2023-10-28 NOTE — PROGRESS NOTES
ARU Admission Assessment - [facility]      A Complete drug regimen review was completed for this patient this date. [x]  No clinically significant medication issue was identified   []  Yes, a clinically significant medication issue was identified     []  Adverse Drug Event:    []  Allergy:    []  Side Effect:    []  Ineffective Therapy:    []  Drug interaction:     []  Duplicate Therapy:    []  Untreated Indication:    []  Non-adherence:    []  Other:  Nursing contacted the physician:       Date:                Time:    Actions recommended by physician were completed:   Date:                 Time:  Action(s) Taken:             []  New Physician Order Received    []  Issue Noted by Physician; However No Action Required    []  Other:        Ethnicity  \"Are you of , /a, or Maori origin? \"  Check all that apply:  [x] A. No, not of , /a, or [place] Origin  [] B.  Yes, [place] American, Chicano/a  [] C.  Yes, [address] [place] [address]  [] D.  Yes, [place]  [] E.  Yes, another , , or Maori origin  [] X. Patient unable to respond    Race  \"What is your race? \"  Check all that apply:  [] A. White  [x] B. Black or   [] C. American Pr[place] or [place] Native  [] D.  Pratik  [] E. [place]  [] F. Malian  [] G. [place]  [] Livan Fortune  [] I. [place]  [] J.  Other   [] K.   [] L. Slovak or Eboni  [] M. Iranian  [] N. Other -[address] [place] [address]  [] X. Patient unable to respond    Language  A. \"What is your preferred language? \"   English    B. \"Do you need or want an  to communicate with a doctor or health care staff? \"  Check only one:  [x] 0. No  [] 1. Yes  [] 9. Unable to determine    Transportation  \"In the past 6-12 months, has lack of transportation kept you from medical appointments, meetings, work, or from getting things needed for daily living? \"  Check all that apply:  [] A.  Yes, it has kept me from

## 2023-10-28 NOTE — PROGRESS NOTES
4 Eyes Skin Assessment     NAME:  Filemon Tinsley  YOB: 1940  MEDICAL RECORD NUMBER:  [id number]    The patient is being assessed for  Admission    I agree that at least one RN has performed a thorough Head to Toe Skin Assessment on the patient. ALL assessment sites listed below have been assessed. Areas assessed by both nurses:    Head, Face, Ears, Shoulders, Back, Chest, Arms, Elbows, Hands, Sacrum. Buttock, Coccyx, Ischium, Legs. Feet and Heels, and Under Medical Devices -- Left and right groin areas reddened and unblanchable        Does the Patient have a Wound? Yes wound(s) were present on assessment.  LDA wound assessment was Initiated and completed by RN--Left groin area pinpoint open area       Judd Prevention initiated by RN: Yes  Wound Care Orders initiated by RN: Yes--Woundcare consult    Pressure Injury (Stage 3,4, Unstageable, DTI, NWPT, and Complex wounds) if present, place Wound referral order by RN under : No    New Ostomies, if present place, Ostomy referral order under : Yes --suprapubic catheter present, area red but blanchable    Nurse 1 eSignature: Electronically signed by Lita Schrader RN on 25/92/88 at 6:32 PM EDT    **SHARE this note so that the co-signing nurse can place an eSignature**    Nurse 2 eSignature: Electronically signed by Tian East RN on 10/28/23 at 6:49 PM EDT

## 2023-10-29 LAB
GLUCOSE BLD-MCNC: 134 MG/DL (ref 70–99)
GLUCOSE BLD-MCNC: 165 MG/DL (ref 70–99)
GLUCOSE BLD-MCNC: 171 MG/DL (ref 70–99)
GLUCOSE BLD-MCNC: 223 MG/DL (ref 70–99)

## 2023-10-29 PROCEDURE — 82962 GLUCOSE BLOOD TEST: CPT

## 2023-10-29 PROCEDURE — 6360000002 HC RX W HCPCS: Performed by: INTERNAL MEDICINE

## 2023-10-29 PROCEDURE — 6370000000 HC RX 637 (ALT 250 FOR IP): Performed by: INTERNAL MEDICINE

## 2023-10-29 PROCEDURE — 1280000000 HC REHAB R&B

## 2023-10-29 PROCEDURE — 94761 N-INVAS EAR/PLS OXIMETRY MLT: CPT

## 2023-10-29 PROCEDURE — 94150 VITAL CAPACITY TEST: CPT

## 2023-10-29 PROCEDURE — 2580000003 HC RX 258: Performed by: INTERNAL MEDICINE

## 2023-10-29 RX ORDER — PREGABALIN 50 MG/1
50 CAPSULE ORAL 2 TIMES DAILY
Status: ON HOLD | COMMUNITY
End: 2023-11-09 | Stop reason: HOSPADM

## 2023-10-29 RX ORDER — DICYCLOMINE HYDROCHLORIDE 10 MG/1
10 CAPSULE ORAL
Status: ON HOLD | COMMUNITY
End: 2023-11-09 | Stop reason: HOSPADM

## 2023-10-29 RX ORDER — POLYETHYLENE GLYCOL 3350 17 G/17G
17 POWDER, FOR SOLUTION ORAL DAILY
COMMUNITY

## 2023-10-29 RX ORDER — DOCUSATE SODIUM 100 MG/1
100 CAPSULE, LIQUID FILLED ORAL 2 TIMES DAILY PRN
COMMUNITY

## 2023-10-29 RX ORDER — ALBUTEROL SULFATE 90 UG/1
1 AEROSOL, METERED RESPIRATORY (INHALATION) 4 TIMES DAILY
COMMUNITY

## 2023-10-29 RX ORDER — LOSARTAN POTASSIUM AND HYDROCHLOROTHIAZIDE 12.5; 5 MG/1; MG/1
1 TABLET ORAL 2 TIMES DAILY
Status: DISCONTINUED | OUTPATIENT
Start: 2023-10-29 | End: 2023-11-09 | Stop reason: HOSPADM

## 2023-10-29 RX ORDER — LOPERAMIDE HYDROCHLORIDE 2 MG/1
2 CAPSULE ORAL 4 TIMES DAILY PRN
Status: ON HOLD | COMMUNITY
End: 2023-11-09 | Stop reason: HOSPADM

## 2023-10-29 RX ADMIN — HEPARIN SODIUM 5000 UNITS: 5000 INJECTION INTRAVENOUS; SUBCUTANEOUS at 05:54

## 2023-10-29 RX ADMIN — LOSARTAN POTASSIUM AND HYDROCHLOROTHIAZIDE 1 TABLET: 50; 12.5 TABLET, FILM COATED ORAL at 21:08

## 2023-10-29 RX ADMIN — INSULIN LISPRO 1 UNITS: 100 INJECTION, SOLUTION INTRAVENOUS; SUBCUTANEOUS at 12:39

## 2023-10-29 RX ADMIN — LOSARTAN POTASSIUM AND HYDROCHLOROTHIAZIDE 1 TABLET: 50; 12.5 TABLET, FILM COATED ORAL at 14:09

## 2023-10-29 RX ADMIN — LEVOTHYROXINE SODIUM 75 MCG: 0.07 TABLET ORAL at 05:54

## 2023-10-29 RX ADMIN — SODIUM CHLORIDE, PRESERVATIVE FREE 10 ML: 5 INJECTION INTRAVENOUS at 21:09

## 2023-10-29 RX ADMIN — ACETAMINOPHEN 650 MG: 325 TABLET ORAL at 06:10

## 2023-10-29 RX ADMIN — HEPARIN SODIUM 5000 UNITS: 5000 INJECTION INTRAVENOUS; SUBCUTANEOUS at 21:08

## 2023-10-29 RX ADMIN — ATORVASTATIN CALCIUM 20 MG: 10 TABLET, FILM COATED ORAL at 21:08

## 2023-10-29 RX ADMIN — SODIUM CHLORIDE, PRESERVATIVE FREE 10 ML: 5 INJECTION INTRAVENOUS at 10:06

## 2023-10-29 RX ADMIN — HEPARIN SODIUM 5000 UNITS: 5000 INJECTION INTRAVENOUS; SUBCUTANEOUS at 14:35

## 2023-10-29 RX ADMIN — AMLODIPINE BESYLATE 10 MG: 10 TABLET ORAL at 10:06

## 2023-10-29 RX ADMIN — MEROPENEM 500 MG: 500 INJECTION, POWDER, FOR SOLUTION INTRAVENOUS at 14:32

## 2023-10-29 RX ADMIN — MEROPENEM 500 MG: 500 INJECTION, POWDER, FOR SOLUTION INTRAVENOUS at 01:28

## 2023-10-29 RX ADMIN — ASPIRIN 81 MG CHEWABLE TABLET 81 MG: 81 TABLET CHEWABLE at 10:05

## 2023-10-29 RX ADMIN — CARVEDILOL 25 MG: 25 TABLET, FILM COATED ORAL at 18:10

## 2023-10-29 RX ADMIN — CARVEDILOL 25 MG: 25 TABLET, FILM COATED ORAL at 10:05

## 2023-10-29 RX ADMIN — PANTOPRAZOLE SODIUM 40 MG: 40 TABLET, DELAYED RELEASE ORAL at 05:54

## 2023-10-29 ASSESSMENT — PAIN DESCRIPTION - LOCATION: LOCATION: ABDOMEN

## 2023-10-29 ASSESSMENT — PAIN SCALES - GENERAL
PAINLEVEL_OUTOF10: 0
PAINLEVEL_OUTOF10: 6
PAINLEVEL_OUTOF10: 0

## 2023-10-29 ASSESSMENT — PAIN SCALES - WONG BAKER
WONGBAKER_NUMERICALRESPONSE: 0
WONGBAKER_NUMERICALRESPONSE: 0

## 2023-10-29 ASSESSMENT — PAIN DESCRIPTION - DESCRIPTORS: DESCRIPTORS: BURNING

## 2023-10-29 ASSESSMENT — PAIN DESCRIPTION - ORIENTATION: ORIENTATION: MID

## 2023-10-29 ASSESSMENT — PAIN - FUNCTIONAL ASSESSMENT: PAIN_FUNCTIONAL_ASSESSMENT: ACTIVITIES ARE NOT PREVENTED

## 2023-10-29 NOTE — CONSULTS
Patient:   Alia Myers    Date:  10/29/23  :  1940, 78 y.o. Nephrologist: Desi Myles MD  Provider: Nicolas Xie MD    Reason for Consult: Recent acute kidney injury with underlying chronic kidney disease stage G3 B/4 A3    Consult requested by : Dr Anabell Broussard MD    Chief Complaint:   Rehabilitation    HISTORY OF PRESENT ILLNESS/Brief hospital course  AND  brief background history    Mr. Brian De Leon, is a 78-year young male, who originally presented to the emergency department with fatigue tiredness not feeling well increased urine sediment. He was subsequently admitted for genitourinary infection with indwelling ureteral stent. He also sustained acute kidney injury which thought to be mainly from infection. He does have chronic hydronephrosis. He sustained an acute kidney injury with peak creatinine of 2.5. With adequate treatment with antimicrobial agent kidney function improved to 2.0. Which is close to his baseline. But he has significant fatigue, frailty and debility, and deemed appropriate for acute rehabilitation, hence transferred to acute rehab unit yesterday. I was asked to see him in follow-up because of advanced chronic kidney disease. This morning he is doing well, he does suprapubic catheter and producing good urine. He has no fever chills or rigors. I am of course very familiar with him. I first met him in 2022, when he was admitted with confusion, and sustained an acute kidney injury-thought to be from obstruction and infection as he has recurrent left hydronephrosis of unknown etiology. I have been following him since then. My last encounter with him was in my office in 2023 after his hospitalization prior to that. His creatinine was running about 2.1 to 2.2 mg/dL.   His blood pressure is well controlled recorded 125/72, although I wanted to add some angiotensin receptor blocker but because of his intermittent hyperkalemia I EXAM:  General appearance: Alert, awake and oriented  HEENT: 1+ conjunctival pallor no scleral icterus  Neck: Supple  Heart: Regular rate and rhythm  LUNGS: Coarse breath sound but no crackles  Abdomen: Soft, nontender, suprapubic catheter  Extremities: Trace leg edema    LABS:  Reviewed, see in epic            IMPRESSION:  Recent acute kidney injury with underlying chronic kidney stage G3 B A3-recovering mainly from genital infection  Chronic hydronephrosis of undetermined etiology requiring chronic ureteral stent with recurrent infection  Underlying history of prostate cancer, diabetes mellitus, and dyslipidemia and advanced age as well as high blood pressure    PLAN:    Ideally should be on renin-angiotensin-aldosterone blocker-I will switch him to losartan soon-although he could be a good candidate for sodium-glucose transporter inhibitor would but because of stent and recurrent urinary tract infection that would be contraindicated. But the goal would be to control blood pressure very well and aggressively control comorbid condition.   He will continue acute rehabilitation, I will follow clinically improved likely biochemically

## 2023-10-29 NOTE — CONSULTS
[facility] [address] [facility] [employer], [address]   Consult Note  [facility] 1 2 3 4 5    Date: 10/29/2023   Patient: Ansley Friend   : 1940   DOA: 10/28/2023   MRN: [id number]   ROOM#: 1017/1017-A     Reason for Consult: Ureteral stent, SPT  Requesting Physician: Dr. Gurinder Valdovinos  Collaborating Urologist on Call at time of admission: Dr. Shai Wilkerson: Weakness    History Obtained From: patient, electronic medical record    HISTORY OF PRESENT ILLNESS:                The patient is a 78 y.o. male with significant past medical history of prostatectomy for cancer, SPT due to eroded AUS, and chronic left stent for left hydro who presented with AMS 10/23/23 and was found to have a sepsis secondary to ESBL E.coli UTI. He was discharged to the ARU 10/27 with plans for cystoscopy, left ureteral stent exchange and SPT exchange 10/31/23 at 1030 at [facility] with Dr. Pauly Gauthier.     Past Medical History:        Diagnosis Date    Acute kidney failure ([address])     Acute urinary tract infection 03/15/2012    Anemia     Ataxia 2010    Ataxia     Bacteremia     Candidiasis     Chronic combined systolic and diastolic congestive heart failure (HCC)     Chronic kidney disease     Cognitive communication deficit     Diabetes mellitus ([address]) 2011    type 2, controlled    Extended spectrum beta lactamase (ESBL) resistance     Fusion of spine of cervical region 10/01/2012    Gait disturbance 2011    History of prostate cancer 1996    adenocarcinoma    History of tobacco use 1959    Hydronephrosis     Hyperkalemia     Hyperlipidemia 2011    Hypertension     Hypertensive heart disease with CHF (congestive heart failure) (HCC)     Hypotension     Immunodeficiency ([address])     Metabolic encephalopathy     Muscle weakness     Osteoarthritis 2011    Osteoarthritis     Secondary Hyperaldosteronism ([address])     Supraventricular tachycardia     Tubulo-interstitial nephritis      Past Surgical History:        Procedure BACTERIAL SUSCEPTIBILITY PANEL USMAN Preliminary     gentamicin Sensitive <=1 BACTERIAL SUSCEPTIBILITY PANEL USMAN Preliminary     levofloxacin Resistant >=8 BACTERIAL SUSCEPTIBILITY PANEL USMAN Preliminary     nitrofurantoin Sensitive <=16 BACTERIAL SUSCEPTIBILITY PANEL USMAN Preliminary     trimethoprim-sulfamethoxazole Resistant >=320 BACTERIAL SUSCEPTIBILITY PANEL USMAN Preliminary           Imaging:  CT ABDOMEN PELVIS WO CONTRAST Additional Contrast? None     Result Date: 10/23/2023  EXAMINATION: CT OF THE ABDOMEN AND PELVIS WITHOUT CONTRAST 10/23/2023 7:05 pm TECHNIQUE: CT of the abdomen and pelvis was performed without the administration of intravenous contrast. Multiplanar reformatted images are provided for review. Automated exposure control, iterative reconstruction, and/or weight based adjustment of the mA/kV was utilized to reduce the radiation dose to as low as reasonably achievable. COMPARISON: CT abdomen pelvis August 6, 2023 HISTORY: ORDERING SYSTEM PROVIDED HISTORY: left flank pain TECHNOLOGIST PROVIDED HISTORY: Reason for exam:->left flank pain Additional Contrast?->None Decision Support Exception - unselect if not a suspected or confirmed emergency medical condition->Emergency Medical Condition (MA) Reason for Exam: left flank pain FINDINGS: Lower Chest: Chronic scarring/fibrotic changes of the lung bases. Organs: Evaluation of the solid organs is limited due to lack of intravenous contrast.  The nonenhanced liver demonstrates no acute process. Gallbladder demonstrates no acute abnormality. Nonenhanced spleen, pancreas, and adrenal glands demonstrate no acute process. A double-J left-sided ureteral stent remains in place as on previous exam.  No hydronephrosis on the left. Mild hydronephrosis and hydroureter on the right. No obstructing stone identified. Increased perinephric stranding is present on the right when compared with prior CT. Correlate clinically to exclude infection.  GI/Bowel: No bowel

## 2023-10-29 NOTE — DISCHARGE INSTR - COC
Continuity of Care Form    Patient Name: Fadia Beyer   :  1940  MRN:  [id number]    [address] date:  10/28/2023  Discharge date:  ***    Code Status Order: Full Code   Advance Directives:     Admitting Physician:  Petrona Suggs MD  PCP: Sujey Knox MD    Discharging Nurse: [facility]/Room#: 1017/1017-A  Discharging Unit Phone Number: ***    Emergency Contact:   Extended Emergency Contact Information  Primary Emergency Contact: Jon Easton  Home Phone: [phone]  Mobile Phone: [phone]  Relation: Child  Preferred language: English   needed? No  Secondary Emergency Contact: Axel Easton  Home Phone: [phone]  Relation: Child    Past Surgical History:  Past Surgical History:   Procedure Laterality Date    BLADDER SURGERY      BLADDER SURGERY Left 2022    CYSTOSCOPY LEFT STENT EXCHANGE performed by Francia Payne MD at [address] [place] [address],Second Floor Left 2022    LEFT CYSTOSCOPY URETERAL STENT EXCHANGE AND [address] [place] [address] performed by Katia Chavez MD at [address] [place] [address]  2013    Cysto with removal of artificial sphinter and placement of suprapubic catheter.     PROSTATE SURGERY      PROSTATECTOMY      infection - had cancer       Immunization History:   Immunization History   Administered Date(s) Administered    COVID-19, MODERNA BLUE border, Primary or Immunocompromised, (age 12y+), IM, 100 mcg/0.5mL 2021, 2021, 2022    COVID-19, PFIZER Bivalent, DO NOT Dilute, (age 12y+), IM, 30 mcg/0.3 mL 2022       Active Problems:  Patient Active Problem List   Diagnosis Code    Gait disturbance R26.9    Generalized weakness R53.1    Diabetes mellitus ([address]) E11.9    Osteoarthritis M19.90    Anemia of chronic disease D63.8    Infected implanted bladder sphincter cuff T83.69XA    Escherichia coli urinary tract infection N39.0, B96.20    Hypertension I10    Sepsis ([address]) A41.9

## 2023-10-30 ENCOUNTER — ANESTHESIA EVENT (OUTPATIENT)
Dept: OPERATING ROOM | Age: 83
DRG: 939 | End: 2023-10-30
Payer: MEDICARE

## 2023-10-30 LAB
ALBUMIN SERPL-MCNC: 3.2 GM/DL (ref 3.4–5)
ALP BLD-CCNC: 83 IU/L (ref 40–128)
ALT SERPL-CCNC: 14 U/L (ref 10–40)
ANION GAP SERPL CALCULATED.3IONS-SCNC: 12 MMOL/L (ref 4–16)
AST SERPL-CCNC: 25 IU/L (ref 15–37)
BILIRUB SERPL-MCNC: 0.2 MG/DL (ref 0–1)
BUN SERPL-MCNC: 32 MG/DL (ref 6–23)
CALCIUM SERPL-MCNC: 8.9 MG/DL (ref 8.3–10.6)
CHLORIDE BLD-SCNC: 105 MMOL/L (ref 99–110)
CO2: 21 MMOL/L (ref 21–32)
CREAT SERPL-MCNC: 2 MG/DL (ref 0.9–1.3)
GFR SERPL CREATININE-BSD FRML MDRD: 32 ML/MIN/1.73M2
GLUCOSE BLD-MCNC: 135 MG/DL (ref 70–99)
GLUCOSE BLD-MCNC: 149 MG/DL (ref 70–99)
GLUCOSE BLD-MCNC: 173 MG/DL (ref 70–99)
GLUCOSE BLD-MCNC: 193 MG/DL (ref 70–99)
GLUCOSE SERPL-MCNC: 175 MG/DL (ref 70–99)
POTASSIUM SERPL-SCNC: 4.9 MMOL/L (ref 3.5–5.1)
SODIUM BLD-SCNC: 138 MMOL/L (ref 135–145)
TOTAL PROTEIN: 5.9 GM/DL (ref 6.4–8.2)

## 2023-10-30 PROCEDURE — 6370000000 HC RX 637 (ALT 250 FOR IP): Performed by: INTERNAL MEDICINE

## 2023-10-30 PROCEDURE — 94150 VITAL CAPACITY TEST: CPT

## 2023-10-30 PROCEDURE — 6360000002 HC RX W HCPCS: Performed by: INTERNAL MEDICINE

## 2023-10-30 PROCEDURE — 97116 GAIT TRAINING THERAPY: CPT

## 2023-10-30 PROCEDURE — 94761 N-INVAS EAR/PLS OXIMETRY MLT: CPT

## 2023-10-30 PROCEDURE — 80053 COMPREHEN METABOLIC PANEL: CPT

## 2023-10-30 PROCEDURE — 99211 OFF/OP EST MAY X REQ PHY/QHP: CPT

## 2023-10-30 PROCEDURE — 82962 GLUCOSE BLOOD TEST: CPT

## 2023-10-30 PROCEDURE — 97110 THERAPEUTIC EXERCISES: CPT

## 2023-10-30 PROCEDURE — 76937 US GUIDE VASCULAR ACCESS: CPT

## 2023-10-30 PROCEDURE — 97530 THERAPEUTIC ACTIVITIES: CPT

## 2023-10-30 PROCEDURE — 36415 COLL VENOUS BLD VENIPUNCTURE: CPT

## 2023-10-30 PROCEDURE — 2580000003 HC RX 258: Performed by: INTERNAL MEDICINE

## 2023-10-30 PROCEDURE — 97535 SELF CARE MNGMENT TRAINING: CPT

## 2023-10-30 PROCEDURE — 1280000000 HC REHAB R&B

## 2023-10-30 PROCEDURE — 99232 SBSQ HOSP IP/OBS MODERATE 35: CPT | Performed by: PHYSICAL MEDICINE & REHABILITATION

## 2023-10-30 RX ADMIN — ASPIRIN 81 MG CHEWABLE TABLET 81 MG: 81 TABLET CHEWABLE at 09:40

## 2023-10-30 RX ADMIN — ACETAMINOPHEN 650 MG: 325 TABLET ORAL at 02:34

## 2023-10-30 RX ADMIN — HEPARIN SODIUM 5000 UNITS: 5000 INJECTION INTRAVENOUS; SUBCUTANEOUS at 13:38

## 2023-10-30 RX ADMIN — LEVOTHYROXINE SODIUM 75 MCG: 0.07 TABLET ORAL at 05:38

## 2023-10-30 RX ADMIN — PANTOPRAZOLE SODIUM 40 MG: 40 TABLET, DELAYED RELEASE ORAL at 05:38

## 2023-10-30 RX ADMIN — MEROPENEM 500 MG: 500 INJECTION, POWDER, FOR SOLUTION INTRAVENOUS at 01:15

## 2023-10-30 RX ADMIN — LOSARTAN POTASSIUM AND HYDROCHLOROTHIAZIDE 1 TABLET: 50; 12.5 TABLET, FILM COATED ORAL at 09:41

## 2023-10-30 RX ADMIN — CARVEDILOL 25 MG: 25 TABLET, FILM COATED ORAL at 09:40

## 2023-10-30 RX ADMIN — MEROPENEM 500 MG: 500 INJECTION, POWDER, FOR SOLUTION INTRAVENOUS at 13:43

## 2023-10-30 RX ADMIN — ATORVASTATIN CALCIUM 20 MG: 10 TABLET, FILM COATED ORAL at 21:59

## 2023-10-30 RX ADMIN — LOSARTAN POTASSIUM AND HYDROCHLOROTHIAZIDE 1 TABLET: 50; 12.5 TABLET, FILM COATED ORAL at 22:01

## 2023-10-30 RX ADMIN — SODIUM CHLORIDE, PRESERVATIVE FREE 10 ML: 5 INJECTION INTRAVENOUS at 22:09

## 2023-10-30 RX ADMIN — HEPARIN SODIUM 5000 UNITS: 5000 INJECTION INTRAVENOUS; SUBCUTANEOUS at 05:38

## 2023-10-30 RX ADMIN — AMLODIPINE BESYLATE 10 MG: 10 TABLET ORAL at 09:40

## 2023-10-30 RX ADMIN — CARVEDILOL 25 MG: 25 TABLET, FILM COATED ORAL at 17:16

## 2023-10-30 ASSESSMENT — PAIN SCALES - GENERAL
PAINLEVEL_OUTOF10: 4
PAINLEVEL_OUTOF10: 2
PAINLEVEL_OUTOF10: 4
PAINLEVEL_OUTOF10: 2
PAINLEVEL_OUTOF10: 0

## 2023-10-30 ASSESSMENT — PAIN DESCRIPTION - ORIENTATION
ORIENTATION: LOWER
ORIENTATION: LOWER;POSTERIOR
ORIENTATION: LOWER

## 2023-10-30 ASSESSMENT — PAIN - FUNCTIONAL ASSESSMENT: PAIN_FUNCTIONAL_ASSESSMENT: ACTIVITIES ARE NOT PREVENTED

## 2023-10-30 ASSESSMENT — PAIN DESCRIPTION - LOCATION
LOCATION: BACK

## 2023-10-30 ASSESSMENT — PAIN DESCRIPTION - PAIN TYPE
TYPE: ACUTE PAIN
TYPE: ACUTE PAIN

## 2023-10-30 ASSESSMENT — PAIN SCALES - WONG BAKER
WONGBAKER_NUMERICALRESPONSE: 2
WONGBAKER_NUMERICALRESPONSE: 2
WONGBAKER_NUMERICALRESPONSE: 0
WONGBAKER_NUMERICALRESPONSE: 2

## 2023-10-30 ASSESSMENT — PAIN DESCRIPTION - DESCRIPTORS
DESCRIPTORS: ACHING;DISCOMFORT
DESCRIPTORS: ACHING
DESCRIPTORS: ACHING;DISCOMFORT

## 2023-10-30 ASSESSMENT — PAIN DESCRIPTION - FREQUENCY
FREQUENCY: INTERMITTENT
FREQUENCY: INTERMITTENT

## 2023-10-30 ASSESSMENT — PAIN DESCRIPTION - ONSET
ONSET: ON-GOING
ONSET: ON-GOING

## 2023-10-30 NOTE — ANESTHESIA PRE PROCEDURE
Department of Anesthesiology  Preprocedure Note       Name:  Lauro Flower   Age:  78 y.o.  :  1940                                          MRN:  [id number]         Date:  10/30/2023      Surgeon: Nuria Arevalo):  Eric Blank MD    Procedure: Procedure(s):  LEFT CYSTOSCOPY URETERAL STENT EXCHANGE  CYSTOSCOPY SUPRAPUBIC TUBE EXCHANGE    Medications prior to admission:   Prior to Admission medications    Medication Sig Start Date End Date Taking? Authorizing Provider   pregabalin (LYRICA) 50 MG capsule Take 1 capsule by mouth 2 times daily. Max Daily Amount: 100 mg    Celso Espinoza MD   albuterol sulfate HFA (VENTOLIN HFA) 108 (90 Base) MCG/ACT inhaler Inhale 1 puff into the lungs 4 times daily Indications: shortness of breath    Celso Espinoza MD   dicyclomine (BENTYL) 10 MG capsule Take 1 capsule by mouth 4 times daily (before meals and nightly) Indications: as needed for intestinal cramps    Celso Espinoza MD   docusate sodium (COLACE) 100 MG capsule Take 1 capsule by mouth 2 times daily as needed for Constipation    Celso Espinoza MD   loperamide (IMODIUM) 2 MG capsule Take 1 capsule by mouth 4 times daily as needed for Diarrhea    Celso Espinoza MD   polyethylene glycol (GLYCOLAX) 17 GM/SCOOP powder Take 17 g by mouth daily Indications: as needed    Celso Espinoza MD   ertapenem (INVANZ) infusion Infuse 500 mg intravenously every 24 hours for 6 days Compound per protocol.  10/28/23 11/3/23  Alesia Braun MD   atorvastatin (LIPITOR) 80 MG tablet  23   Ruby Amaya DO   torsemide [facility]) 10 MG tablet  23   Ruby Amaya DO   amLODIPine (NORVASC) 10 MG tablet Take 1 tablet by mouth daily 23   Faisal Tavera MD   carvedilol (COREG) 25 MG tablet Take 1 tablet by mouth 2 times daily    Celso Espinoza MD   insulin glargine (LANTUS SOLOSTAR) 100 UNIT/ML injection pen Inject 17 Units into the skin nightly

## 2023-10-30 NOTE — PROGRESS NOTES
Comprehensive Nutrition Assessment    Type and Reason for Visit:  Initial, Consult (oral nutrition supplement)    Nutrition Recommendations/Plan:   Continue current carb controlled diet   May offer diabetic or low calorie, high protein oral nutrition supplement during stay as needed/accepted by patient   Will continue to follow up during stay      Malnutrition Assessment:  Malnutrition Status:  Insufficient data (10/30/23 1225)    Context:  Acute Illness       Nutrition Assessment:    Admit to acute rehab unit with hx fatigue, sepsis. Currently on carb controlled diet with hx DM. Recent meal intake %. No significant weight loss in past year per hx. Will be NPO tonight for procedure tomorrow, cystoscopy with stent exchange. Will follow at moderate nutrition risk at this time with concern for adequate intake during stay. Nutrition Related Findings:    not available on room visit, hx DM, CKD, prostate cancer   glucose POCT some over 200 mg/dL Wound Type: None       Current Nutrition Intake & Therapies:    Average Meal Intake: %  Average Supplements Intake: None Ordered  ADULT DIET; Regular; 5 carb choices (75 gm/meal)  Diet NPO Exceptions are: Sips of Water with Meds    Anthropometric Measures:  Height: 172.7 cm (5' 7.99\")  Ideal Body Weight (IBW): 154 lbs (70 kg)    Admission Body Weight: 87.7 kg (193 lb 5.5 oz)  Current Body Weight: 87.6 kg (193 lb 2 oz), 125.4 % IBW. Weight Source: Bed Scale  Current BMI (kg/m2): 29.4  Usual Body Weight: 89.4 kg (197 lb 1.5 oz) ( October 2022)  % Weight Change (Calculated): -2  Weight Adjustment For: No Adjustment                 BMI Categories: Overweight (BMI 25.0-29. 9)    Estimated Daily Nutrient Needs:  Energy Requirements Based On: Formula  Weight Used for Energy Requirements: Current  Energy (kcal/day): 0768-5946 (mifflin st jeor)  Weight Used for Protein Requirements: Ideal  Protein (g/day): 70-84 (1-1.2 g/kg)  Method Used for Fluid Requirements: 1 ml/kcal  Fluid (ml/day): 2000    Nutrition Diagnosis:   Predicted inadequate energy intake related to other (comment) (potential reduced appetite in illness) as evidenced by other (comment) (limited po data)    Nutrition Interventions:   Food and/or Nutrient Delivery: Continue Current Diet, Start Oral Nutrition Supplement  Nutrition Education/Counseling: No recommendation at this time  Coordination of Nutrition Care: Continue to monitor while inpatient       Goals:     Goals: PO intake 75% or greater, by next RD assessment       Nutrition Monitoring and Evaluation:   Behavioral-Environmental Outcomes: None Identified  Food/Nutrient Intake Outcomes: Food and Nutrient Intake, Diet Advancement/Tolerance  Physical Signs/Symptoms Outcomes: Biochemical Data, Nutrition Focused Physical Findings, Meal Time Behavior, Skin, Weight    Discharge Planning:    Continue current diet     Georgie Briseno RD, LD  Contact: [phone]

## 2023-10-30 NOTE — PROGRESS NOTES
Physical Therapy  [x] daily progress note       [x] discharge       Patient Name:  Yolanda Tipton   :  1940 MRN: [id number]  Room:  [address] [place], [place] [address] Date of Admission: 10/28/2023  Rehabilitation Diagnosis:   Sepsis ([address]) [A41.9]       Date 10/30/2023       Day of ARU Week:  3   Time IN//1045   Individual Tx Minutes 70   Group Tx Minutes    Co-Treat Minutes    Concurrent Tx Minutes    TOTAL Tx Time Mins 70   Variance Time    Variance Time []   Refusal due to:     []   Medical hold/reason:    []   Illness   []   Off Unit for test/procedure  []   Extra time needed to complete task  []   Therapeutic need  []   Other (specify):   Restrictions Restrictions/Precautions  Restrictions/Precautions: General Precautions, Fall Risk, Contact Precautions (contact precautions, IV RUE, suprapubic catheter)      Interdisciplinary communication [x]   Cleared for therapy per nursing     []   RN notified about issues during session  []   RN updated on pt performance  []   Spoke with   []   Spoke with OT  []   Spoke with MD  []   Other:    Subjective observations and cognitive status: Pt in bed. This therapist is primary for pt and social situation and goals were reviewed at start of session. Feel that many goals were set at a level that is higher than pt's baseline, but that do have potential for attaining pending medical issues, so did not change POC.       Pain level/location:    0/10       Location:    Discharge recommendations  Anticipated discharge date:  tbd  Destination: []home alone   []home alone with assist PRN     [] home w/ family      [] Continuous supervision  []SNF    [] Assisted living     [] Other:  Continued therapy: []C PT  []OUTPATIENT PT   [] No Further PT  []SNF PT  Caregiver training recommended: []Yes  [] No   Equipment needs: tbd     PT IRF-LESTER scores and goals for discharge assessment:   Roll Left and Right  Assistance Needed: Partial/moderate assistance  Comment: min assist with

## 2023-10-30 NOTE — CARE COORDINATION
Case Management Admission Note    Patient:Greg Hernandez      :1940  [facility]:[id number]  Rehab Dx/Hx: Sepsis ([address]) [A41.9]    Chief Complaint:   Past Medical History:   Diagnosis Date    Acute kidney failure ([address])     Acute urinary tract infection 03/15/2012    Anemia     Ataxia 2010    Ataxia     Bacteremia     Candidiasis     Chronic combined systolic and diastolic congestive heart failure (HCC)     Chronic kidney disease     Cognitive communication deficit     Diabetes mellitus ([address]) 2011    type 2, controlled    Extended spectrum beta lactamase (ESBL) resistance     Fusion of spine of cervical region 10/01/2012    Gait disturbance 2011    History of prostate cancer 1996    adenocarcinoma    History of tobacco use 1959    Hydronephrosis     Hyperkalemia     Hyperlipidemia 2011    Hypertension     Hypertensive heart disease with CHF (congestive heart failure) (HCC)     Hypotension     Immunodeficiency ([address])     Metabolic encephalopathy     Muscle weakness     Osteoarthritis 2011    Osteoarthritis     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     PROSTATE SURGERY      PROSTATECTOMY      infection - had cancer     No Known Allergies  Precautions: falls    Date of Admit: 10/28/2023  Room #: 1017/1017-A    Current functional status at time of admit:  Home Living/DME Available:  Type of Home: House  Home Access: Ramped

## 2023-10-30 NOTE — CONSULTS
[facility] Continence Nurse  Consult Note       Jluis Myers  AGE: 78 y.o.    GENDER: male  : 1940  TODAY'S DATE:  10/30/2023    Subjective:     Reason for  Evaluation and Assessment: wound care clarisa Myers is a 78 y.o. male referred by:   [x] Physician  [] Nursing  [] Other:     Wound Identification:  Wound Type: moisture  Contributing Factors: chronic pressure, decreased mobility, and incontinence of stool        PAST MEDICAL HISTORY        Diagnosis Date    Acute kidney failure ([address])     Acute urinary tract infection 03/15/2012    Anemia     Ataxia 2010    Ataxia     Bacteremia     Candidiasis     Chronic combined systolic and diastolic congestive heart failure (HCC)     Chronic kidney disease     Cognitive communication deficit     Diabetes mellitus ([address]) 2011    type 2, controlled    Extended spectrum beta lactamase (ESBL) resistance     Fusion of spine of cervical region 10/01/2012    Gait disturbance 2011    History of prostate cancer 1996    adenocarcinoma    History of tobacco use 1959    Hydronephrosis     Hyperkalemia     Hyperlipidemia 2011    Hypertension     Hypertensive heart disease with CHF (congestive heart failure) (HCC)     Hypotension     Immunodeficiency ([address])     Metabolic encephalopathy     Muscle weakness     Osteoarthritis 2011    Osteoarthritis     Secondary Hyperaldosteronism ([address])     Supraventricular tachycardia     Tubulo-interstitial nephritis        PAST SURGICAL HISTORY    Past Surgical History:   Procedure Laterality Date    BLADDER SURGERY      BLADDER SURGERY Left 2022    CYSTOSCOPY LEFT STENT EXCHANGE performed by Antonio Cowan MD at [address] Left 2022    LEFT CYSTOSCOPY URETERAL STENT EXCHANGE AND [address] [place] [address] performed by Xin Mcneill MD at [address] [place] [address]  2013    Cysto with removal of artificial sphinter

## 2023-10-30 NOTE — PROGRESS NOTES
Occupational Therapy  Physical Rehabilitation: OCCUPATIONAL THERAPY     [x] daily progress note       [] discharge       Patient Name:  Lauro Flower   :  1940 MRN: [id number]  Room:  [address] [place] [address] Date of Admission: 10/28/2023  Rehabilitation Diagnosis:   Sepsis ([address]) [A41.9]       Date 10/30/2023       Day of ARU Week:  3   Time IN/OUT 1430/1530   Individual Tx Minutes 60   Group Tx Minutes    Co-Treat Minutes    Concurrent Tx Minutes    TOTAL Tx Time Mins 60   Variance Time    Variance Time []   Refusal due to:     []   Medical hold/reason:    []   Illness   []   Off Unit for test/procedure  []   Extra time needed to complete task  []   Therapeutic need  []   Other (specify):   Restrictions Restrictions/Precautions: General Precautions, Fall Risk, Contact Precautions (contact precautions, IV RUE, suprapubic catheter)         Communication with other providers: [x]   OK to see per nursing:     []   Spoke with team member regarding:      Subjective observations and cognitive status: Pt in bed on approach, reporting he slept poorly last night and also threw up his breakfast, and wasn't in the mood for lunch. Pt did eat a pack of crackers, a cup of water, and a Jello during session.   Agreeable to participate     Pain level/location:    /10       Location: Denied   Discharge recommendations  Anticipated discharge date:  TBD  Destination: []home alone   []home alone w assist prn   [x] home w/ family    [] Continuous supervision       []SNF    [] Assisted living     [] Other:   Continued therapy: [x]HHC OT  []OUTPATIENT  OT   [] No Further OT  Equipment needs: None     Toileting:   Denied need    Bed Mobility:           []   Pt received out of bed   Supine --> Sit:  Min A using bed features    Transfers:    Sit--> Stand:  CGA, increased time  Stand --> Sit:   Ranged from CGA-min A d/t decreased eccentric control   Stand-Pivot:   CGA  Other:    Assistive device required for transfer:   RW      Functional

## 2023-10-30 NOTE — CONSULTS
Consult completed. Procedure/rationale explained to pt & consent obtained. #22ga Nexiva extra-long, 1.75\" PIV initiated using UltraSound-guided technique without difficulty/complications. Pt tolerated well and no other c/o or needs noted or reported. Primary nurse notified.

## 2023-10-30 NOTE — PROGRESS NOTES
Physical Therapy  [x] daily progress note       [] discharge       Patient Name:  Tori Wolf   :  1940 MRN: [id number]  Room:  [address] [place], [place] [address] Date of Admission: 10/28/2023  Rehabilitation Diagnosis:   Sepsis ([address]) [A41.9]       Date 10/30/2023       Day of ARU Week:  3   Time IN/OUT 1540/1630   Individual Tx Minutes 50   Group Tx Minutes    Co-Treat Minutes    Concurrent Tx Minutes    TOTAL Tx Time Mins 50   Variance Time    Variance Time []   Refusal due to:     []   Medical hold/reason:    []   Illness   []   Off Unit for test/procedure  []   Extra time needed to complete task  []   Therapeutic need  []   Other (specify):   Restrictions Restrictions/Precautions  Restrictions/Precautions: General Precautions, Fall Risk, Contact Precautions (contact precautions, IV RUE, suprapubic catheter)      Communication with other providers: []   OK to see per nursing:     [x]   Spoke with team member regarding:   Rodney REZA   Subjective observations and cognitive status:   Pt in common area with Rodney REZA. OT reporting pt has been nauseous and just finished a Jell-o. Pt states, \"I will try to do what I can. I'm just not into it today. \" Pt's son present at end of session in room to provide additional history and encourage pt to do more. Pain level/location:    0/10       Location: no pain   Discharge recommendations  Anticipated discharge date:  TBD  Destination: []home alone   []home alone with assist PRN     [x] home w/ family      [] Continuous supervision  []SNF    [] Assisted living     [] Other:   Continued therapy: [x]C PT  []OUTPATIENT  PT   [] No Further PT  Equipment needs: TBD         Bed Mobility:           [x]   Pt received out of bed   Rolling R/L:  SBA to R and L   Scooting:  CGA at bedside  Lying --> Sit:  CGA from R side of bed with HOB slightly elevated. Demos ability to scoot B LEs forward for WB CGA.   Sit --> lying:  Attempts x 1 trial towards L with mod A x 1 and x 1 trial towards R with training, Pain management, Patient/Caregiver education & training, Safety education & training, Home exercise program, Positioning, Therapeutic activities, Co-Treatment, Group Therapy    Electronically signed by   Eric Ray PT, DPT  10/30/2023, 3:39 PM

## 2023-10-31 ENCOUNTER — ANESTHESIA (OUTPATIENT)
Dept: OPERATING ROOM | Age: 83
DRG: 939 | End: 2023-10-31
Payer: MEDICARE

## 2023-10-31 ENCOUNTER — APPOINTMENT (OUTPATIENT)
Dept: GENERAL RADIOLOGY | Age: 83
DRG: 939 | End: 2023-10-31
Attending: PHYSICAL MEDICINE & REHABILITATION
Payer: MEDICARE

## 2023-10-31 LAB
GLUCOSE BLD-MCNC: 167 MG/DL (ref 70–99)
GLUCOSE BLD-MCNC: 176 MG/DL (ref 70–99)
GLUCOSE BLD-MCNC: 180 MG/DL (ref 70–99)
GLUCOSE BLD-MCNC: 241 MG/DL (ref 70–99)

## 2023-10-31 PROCEDURE — 3700000000 HC ANESTHESIA ATTENDED CARE: Performed by: SPECIALIST

## 2023-10-31 PROCEDURE — 6370000000 HC RX 637 (ALT 250 FOR IP): Performed by: INTERNAL MEDICINE

## 2023-10-31 PROCEDURE — 97110 THERAPEUTIC EXERCISES: CPT

## 2023-10-31 PROCEDURE — 6360000002 HC RX W HCPCS: Performed by: INTERNAL MEDICINE

## 2023-10-31 PROCEDURE — 97116 GAIT TRAINING THERAPY: CPT

## 2023-10-31 PROCEDURE — 3600000003 HC SURGERY LEVEL 3 BASE: Performed by: SPECIALIST

## 2023-10-31 PROCEDURE — C1769 GUIDE WIRE: HCPCS | Performed by: SPECIALIST

## 2023-10-31 PROCEDURE — 0TC78ZZ EXTIRPATION OF MATTER FROM LEFT URETER, VIA NATURAL OR ARTIFICIAL OPENING ENDOSCOPIC: ICD-10-PCS | Performed by: SPECIALIST

## 2023-10-31 PROCEDURE — 94150 VITAL CAPACITY TEST: CPT

## 2023-10-31 PROCEDURE — 1280000000 HC REHAB R&B

## 2023-10-31 PROCEDURE — 76000 FLUOROSCOPY <1 HR PHYS/QHP: CPT

## 2023-10-31 PROCEDURE — 6360000002 HC RX W HCPCS: Performed by: NURSE ANESTHETIST, CERTIFIED REGISTERED

## 2023-10-31 PROCEDURE — 2709999900 HC NON-CHARGEABLE SUPPLY: Performed by: SPECIALIST

## 2023-10-31 PROCEDURE — 82962 GLUCOSE BLOOD TEST: CPT

## 2023-10-31 PROCEDURE — C1758 CATHETER, URETERAL: HCPCS | Performed by: SPECIALIST

## 2023-10-31 PROCEDURE — 97535 SELF CARE MNGMENT TRAINING: CPT

## 2023-10-31 PROCEDURE — 0TP98DZ REMOVAL OF INTRALUMINAL DEVICE FROM URETER, VIA NATURAL OR ARTIFICIAL OPENING ENDOSCOPIC: ICD-10-PCS | Performed by: SPECIALIST

## 2023-10-31 PROCEDURE — 94761 N-INVAS EAR/PLS OXIMETRY MLT: CPT

## 2023-10-31 PROCEDURE — 2500000003 HC RX 250 WO HCPCS: Performed by: INTERNAL MEDICINE

## 2023-10-31 PROCEDURE — 2500000003 HC RX 250 WO HCPCS: Performed by: NURSE ANESTHETIST, CERTIFIED REGISTERED

## 2023-10-31 PROCEDURE — 97530 THERAPEUTIC ACTIVITIES: CPT

## 2023-10-31 PROCEDURE — 3600000013 HC SURGERY LEVEL 3 ADDTL 15MIN: Performed by: SPECIALIST

## 2023-10-31 PROCEDURE — C2617 STENT, NON-COR, TEM W/O DEL: HCPCS | Performed by: SPECIALIST

## 2023-10-31 PROCEDURE — 3700000001 HC ADD 15 MINUTES (ANESTHESIA): Performed by: SPECIALIST

## 2023-10-31 PROCEDURE — 2580000003 HC RX 258: Performed by: INTERNAL MEDICINE

## 2023-10-31 PROCEDURE — 0T778DZ DILATION OF LEFT URETER WITH INTRALUMINAL DEVICE, VIA NATURAL OR ARTIFICIAL OPENING ENDOSCOPIC: ICD-10-PCS | Performed by: SPECIALIST

## 2023-10-31 DEVICE — VARIABLE LENGTH URETERAL STENT
Type: IMPLANTABLE DEVICE | Site: URETER | Status: FUNCTIONAL
Brand: CONTOUR VL™

## 2023-10-31 RX ORDER — HYDRALAZINE HYDROCHLORIDE 20 MG/ML
10 INJECTION INTRAMUSCULAR; INTRAVENOUS
Status: CANCELLED | OUTPATIENT
Start: 2023-10-31

## 2023-10-31 RX ORDER — DROPERIDOL 2.5 MG/ML
0.62 INJECTION, SOLUTION INTRAMUSCULAR; INTRAVENOUS
Status: CANCELLED | OUTPATIENT
Start: 2023-10-31 | End: 2023-11-01

## 2023-10-31 RX ORDER — SODIUM CHLORIDE 9 MG/ML
INJECTION, SOLUTION INTRAVENOUS PRN
Status: CANCELLED | OUTPATIENT
Start: 2023-10-31

## 2023-10-31 RX ORDER — MEPERIDINE HYDROCHLORIDE 25 MG/ML
12.5 INJECTION INTRAMUSCULAR; INTRAVENOUS; SUBCUTANEOUS EVERY 5 MIN PRN
Status: CANCELLED | OUTPATIENT
Start: 2023-10-31

## 2023-10-31 RX ORDER — SODIUM CHLORIDE 0.9 % (FLUSH) 0.9 %
5-40 SYRINGE (ML) INJECTION EVERY 12 HOURS SCHEDULED
Status: CANCELLED | OUTPATIENT
Start: 2023-10-31

## 2023-10-31 RX ORDER — SODIUM CHLORIDE, SODIUM LACTATE, POTASSIUM CHLORIDE, CALCIUM CHLORIDE 600; 310; 30; 20 MG/100ML; MG/100ML; MG/100ML; MG/100ML
INJECTION, SOLUTION INTRAVENOUS CONTINUOUS PRN
Status: DISCONTINUED | OUTPATIENT
Start: 2023-10-31 | End: 2023-10-31 | Stop reason: SDUPTHER

## 2023-10-31 RX ORDER — LABETALOL HYDROCHLORIDE 5 MG/ML
10 INJECTION, SOLUTION INTRAVENOUS
Status: CANCELLED | OUTPATIENT
Start: 2023-10-31

## 2023-10-31 RX ORDER — PROPOFOL 10 MG/ML
INJECTION, EMULSION INTRAVENOUS PRN
Status: DISCONTINUED | OUTPATIENT
Start: 2023-10-31 | End: 2023-10-31 | Stop reason: SDUPTHER

## 2023-10-31 RX ORDER — FENTANYL CITRATE 50 UG/ML
50 INJECTION, SOLUTION INTRAMUSCULAR; INTRAVENOUS EVERY 5 MIN PRN
Status: CANCELLED | OUTPATIENT
Start: 2023-10-31

## 2023-10-31 RX ORDER — EPHEDRINE SULFATE 50 MG/ML
INJECTION INTRAVENOUS PRN
Status: DISCONTINUED | OUTPATIENT
Start: 2023-10-31 | End: 2023-10-31 | Stop reason: SDUPTHER

## 2023-10-31 RX ORDER — LIDOCAINE HYDROCHLORIDE 20 MG/ML
INJECTION, SOLUTION INTRAVENOUS PRN
Status: DISCONTINUED | OUTPATIENT
Start: 2023-10-31 | End: 2023-10-31 | Stop reason: SDUPTHER

## 2023-10-31 RX ORDER — SODIUM CHLORIDE 0.9 % (FLUSH) 0.9 %
5-40 SYRINGE (ML) INJECTION PRN
Status: CANCELLED | OUTPATIENT
Start: 2023-10-31

## 2023-10-31 RX ORDER — ONDANSETRON 2 MG/ML
4 INJECTION INTRAMUSCULAR; INTRAVENOUS
Status: CANCELLED | OUTPATIENT
Start: 2023-10-31 | End: 2023-11-01

## 2023-10-31 RX ORDER — OXYCODONE HYDROCHLORIDE 5 MG/1
5 TABLET ORAL
Status: CANCELLED | OUTPATIENT
Start: 2023-10-31 | End: 2023-11-01

## 2023-10-31 RX ADMIN — ASPIRIN 81 MG CHEWABLE TABLET 81 MG: 81 TABLET CHEWABLE at 13:15

## 2023-10-31 RX ADMIN — PROPOFOL 50 MG: 10 INJECTION, EMULSION INTRAVENOUS at 11:09

## 2023-10-31 RX ADMIN — EPHEDRINE SULFATE 2.5 MG: 50 INJECTION INTRAVENOUS at 11:15

## 2023-10-31 RX ADMIN — LOSARTAN POTASSIUM AND HYDROCHLOROTHIAZIDE 1 TABLET: 50; 12.5 TABLET, FILM COATED ORAL at 20:32

## 2023-10-31 RX ADMIN — SODIUM CHLORIDE, PRESERVATIVE FREE 5 ML: 5 INJECTION INTRAVENOUS at 20:33

## 2023-10-31 RX ADMIN — INSULIN LISPRO 1 UNITS: 100 INJECTION, SOLUTION INTRAVENOUS; SUBCUTANEOUS at 17:42

## 2023-10-31 RX ADMIN — AMLODIPINE BESYLATE 10 MG: 10 TABLET ORAL at 13:13

## 2023-10-31 RX ADMIN — PROPOFOL 50 MG: 10 INJECTION, EMULSION INTRAVENOUS at 11:04

## 2023-10-31 RX ADMIN — CARVEDILOL 25 MG: 25 TABLET, FILM COATED ORAL at 08:40

## 2023-10-31 RX ADMIN — MICONAZOLE NITRATE: 2 POWDER TOPICAL at 20:33

## 2023-10-31 RX ADMIN — PROPOFOL 50 MG: 10 INJECTION, EMULSION INTRAVENOUS at 11:12

## 2023-10-31 RX ADMIN — SODIUM CHLORIDE, PRESERVATIVE FREE 10 ML: 5 INJECTION INTRAVENOUS at 09:00

## 2023-10-31 RX ADMIN — ATORVASTATIN CALCIUM 20 MG: 10 TABLET, FILM COATED ORAL at 20:33

## 2023-10-31 RX ADMIN — CARVEDILOL 25 MG: 25 TABLET, FILM COATED ORAL at 17:43

## 2023-10-31 RX ADMIN — SODIUM CHLORIDE, SODIUM LACTATE, POTASSIUM CHLORIDE, CALCIUM CHLORIDE: 600; 310; 30; 20 INJECTION, SOLUTION INTRAVENOUS at 10:57

## 2023-10-31 RX ADMIN — PROPOFOL 50 MG: 10 INJECTION, EMULSION INTRAVENOUS at 11:07

## 2023-10-31 RX ADMIN — LOSARTAN POTASSIUM AND HYDROCHLOROTHIAZIDE 1 TABLET: 50; 12.5 TABLET, FILM COATED ORAL at 08:40

## 2023-10-31 RX ADMIN — MEROPENEM 500 MG: 500 INJECTION, POWDER, FOR SOLUTION INTRAVENOUS at 05:00

## 2023-10-31 RX ADMIN — MEROPENEM 500 MG: 500 INJECTION, POWDER, FOR SOLUTION INTRAVENOUS at 17:39

## 2023-10-31 RX ADMIN — LIDOCAINE HYDROCHLORIDE 60 MG: 20 INJECTION, SOLUTION INTRAVENOUS at 11:04

## 2023-10-31 RX ADMIN — EPHEDRINE SULFATE 2.5 MG: 50 INJECTION INTRAVENOUS at 11:12

## 2023-10-31 RX ADMIN — LEVOTHYROXINE SODIUM 75 MCG: 0.07 TABLET ORAL at 13:16

## 2023-10-31 RX ADMIN — PANTOPRAZOLE SODIUM 40 MG: 40 TABLET, DELAYED RELEASE ORAL at 13:13

## 2023-10-31 RX ADMIN — MICONAZOLE NITRATE: 2 POWDER TOPICAL at 10:00

## 2023-10-31 ASSESSMENT — PAIN - FUNCTIONAL ASSESSMENT: PAIN_FUNCTIONAL_ASSESSMENT: 0-10

## 2023-10-31 ASSESSMENT — PAIN SCALES - WONG BAKER
WONGBAKER_NUMERICALRESPONSE: 0

## 2023-10-31 ASSESSMENT — PAIN SCALES - GENERAL
PAINLEVEL_OUTOF10: 0

## 2023-10-31 NOTE — FLOWSHEET NOTE
[x] daily progress note       [] discharge       Patient Name:  Tori Wolf   :  1940 MRN: [id number]  Room:  [address] [place], [place] [address] Date of Admission: 10/28/2023  Rehabilitation Diagnosis:   Sepsis ([address]) [A41.9]       Date 10/31/2023       Day of ARU Week:  4   Time IN/OUT 1100-x (pt not in room. Pt off unit for procedure)   1300-x (pt refused d/t fatigue)   [id number]   Individual Tx Minutes 60   TOTAL Tx Time Mins 60   Variance Time -60 minutes   Variance Reason Pt off unit for procedure    Restrictions Restrictions/Precautions  Restrictions/Precautions: General Precautions, Fall Risk, Contact Precautions (contact precautions, IV RUE, suprapubic catheter)      Communication with other providers: [x]   OK to see per nursing:     []   Spoke with team member regarding:      Subjective observations and cognitive status: PM attempt: pt seen in semi-streeter's position in bed upon entering room. Pt reported that he was still tired from procedure and requested to participate later today in therapy. 2nd PM attempt: Pt seen sitting up in w/c at beginning of treatment. Pt passed from OT Rodney Banks). Agreeable to therapy. Pain level/location:    0/10          Discharge recommendations   Anticipated discharge date:  TBD  Destination: []home alone   []home alone with assist PRN     [x] home w/ family      [] Continuous supervision  []SNF    [] Assisted living     [] Other:   Continued therapy: [x]HHC PT  []OUTPATIENT  PT   [] No Further PT  Equipment needs: TBD     Bed Mobility:           [x]   Pt received out of bed   Scooting: mod A scooting up in bed   Sit --> lying:  mod A for BLEs    Transfers:    Sit--> Stand:  SBA  Stand --> Sit:   SBA  Assistive device required for transfer:   RW  Pt required assist for catheter management. Pt moves slowly. Gait:    Distance:  98'+93'+62'    Assistance:  SBA  Device:  RW  Gait Quality:  reciprocal pattern; inconsistent stride length; slow héctor.  Pt required rest breaks between

## 2023-10-31 NOTE — PROGRESS NOTES
Dependent  Comment: incontinent of bowel c no awareness, required total A for bowel hygiene and changing Attends/performing pants management  CARE Score: 1  Discharge Goal: Independent      Toilet Transfers: Toilet Transfer  Assistance needed: Partial/moderate assistance  Comment: min A c RW, grab bars  CARE Score: 3  Discharge Goal: Independent  Device Used:    []   Standard Toilet         [x]   Grab Bars           []  Bedside Commode       []   Elevated Toilet          []   Other:        Bed Mobility:           []   Pt received out of bed   Supine --> Sit:  Min A      Transfers:    Sit--> Stand:  CGA  Stand --> Sit:   CGA  Assistive device required for transfer:   RW      Functional Mobility:    Assistance:  CGA ~50 ft total; began to walk in hallway but pt then reported need to change Attends after initially declining (however pt thought it was d/t urine incontinence, not bowel) so pt turned around and walked to bathroom  Device:   [x]   Rolling Walker     []   Standard Walker []   Wheelchair        []   [employer]       []   4-Wheeled [employer]         []   Cardiac [facility]       []   Other:        Additional Therapeutic activities/exercises completed this date:     [x]   ADL Training   []   Balance/Postural training     [x]   Bed/Transfer Training   [x]   Endurance Training   []   Neuromuscular Re-ed   []   Nu-step:  Time:        Level:         #Steps:       []   Rebounder:    []  Seated     []  Standing        []   Supine Ther Ex (reps/sets):     [x]   Seated Ther Ex (reps/sets): To increase BUE endurance for ADLs and transfers pt completed 5 sets x10 reps on rickaw c 25#     []   Standing Ther Ex (reps/sets):     []   Other:      Comments: All intervention performed to increase pt's endurance, ax tolerance, balance, and I c ADLs/IADLs and functional transfers/mobility.        Patient/Caregiver Education and Training:   []   [employer] Equipment Use  [x]   Bed Mobility/Transfer Technique/Safety  []   Energy Walker, rolling, Wheelchair-manual, Reacher, Cane, Hospital bed  Has the patient had two or more falls in the past year or any fall with injury in the past year?: Yes  Receives Help From: Family (Pt reports some friends may help and 2 other sons that may help time to time, but reports \"i cannot count on them all the time\". )  ADL Assistance: Needs assistance  Homemaking Assistance: Needs assistance  Homemaking Responsibilities: No  Ambulation Assistance: Independent  Transfer Assistance: Independent  Active : No  Patient's  Info: Sons help with driving. Occupation: Retired  Leisure & Hobbies: walking    Objective                                                                                    Goals:  (Update in navigator)  Short Term Goals  Time Frame for Short Term Goals: STGs=LTGs:  Long Term Goals  Time Frame for Long Term Goals : ~10 days or until d/c  Long Term Goal 1: Pt will complete grooming tasks Ind  Long Term Goal 2: Pt will complete total body bathing mod I using AE  Long Term Goal 3: Pt will complete UB dressing Ind  Long Term Goal 4: Pt will complete LB dressing mod I using AE  Long Term Goal 5: Pt will doff/don footwear mod I using AE  Additional Goals?: Yes  Long Term Goal 6: Pt will complete toileting mod I  Long Term Goal 7: Pt will complete functional transfers (bed, chair, toilet, shower if desired) c DME PRN and mod I  Long Term Goal 8: Pt will perform therex/therax to facilitate increased strength/endurance/ax tolerance (c emphasis on dynamic standing balance/tolerance >8 mins, BUE endurance) c SBA  Long Term Goal 9: Pt will complete simple meal prep ax c DME PRN and mod I:        Plan of Care                                                                              Times per week: 5 days per week for a minimum of 60 minutes/day plus group as appropriate for 60 minutes.   Treatment to include Occupational Therapy Plan  Current Treatment Recommendations: Strengthening, ROM,

## 2023-10-31 NOTE — PROGRESS NOTES
10/31/23 0834   Encounter Summary   Encounter Overview/Reason  Follow-up   Service Provided For: Patient and family together   Referral/Consult From: [facility]   Last Encounter  10/31/23  (Patient being prepped for procedure. Prayer given. RN asked that I  reschedule visit.  Patient is [employer].)   Complexity of Encounter Low   Begin Time 0830   End Time  1900   Total Time Calculated 6 min   Spiritual/Emotional needs   Type Spiritual Support   Assessment/Intervention/Outcome   Assessment Calm   Intervention Prayer (assurance of)/[place];Sustaining Presence/Ministry of presence   Outcome Acceptance   Plan and Referrals   Plan/Referrals Continue Support (comment)  (RN reminded to inform pt. of 's availability.)

## 2023-10-31 NOTE — BRIEF OP NOTE
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 Item Serial No.  Lot No. LRB No. Used Action   STENT URET 4.8FR [id number] PERCFLX HYDR+ SFT PGTL TAPR TIP - [id number]  STENT URET 4.8FR [id number] PERCFLX HYDR+ SFT PGTL TAPR TIP  [employer]- [id number] Left 1 Implanted         Drains:   Suprapubic Catheter (Active)   Site Assessment No urethral drainage 10/27/23 0030   Urine Color Yellow 10/27/23 2100   Urine Appearance Sediment 10/27/23 2100   Urine Odor Malodorous 10/27/23 2100   Collection Container Standard 10/27/23 2100   Securement Method Leg strap 10/27/23 2100   Catheter Best Practices  Drainage tube clipped to bed;Catheter secured to thigh; Tamper seal intact; Bag below bladder;Bag not on floor; Lack of dependent loop in tubing;Drainage bag less than half full 10/27/23 2100   Status Draining 10/27/23 2100   Output (mL) 350 mL 10/31/23 0248       [REMOVED] Urinary Catheter 10/24/23 Other (comment) (Removed)   Catheter Indications Urinary retention (acute or chronic), continuous bladder irrigation or bladder outlet obstruction 10/25/23 1603   Site Assessment [place] 10/25/23 1603   Urine Color Yellow 10/25/23 1603   Urine Appearance Cloudy 10/25/23 1603   Urine Odor Malodorous 10/25/23 1603   Collection Container Standard 10/25/23 1603   Securement Method Leg strap 10/25/23 1603   Catheter Care  Perineal wipes 10/25/23 1603   Catheter Best

## 2023-10-31 NOTE — OP NOTE
[address], [address] [place] [address]                                OPERATIVE REPORT    PATIENT NAME: Bri Sequeira                     :        1940  MED REC NO:   [id number]                          ROOM:       3390  ACCOUNT NO:   [de-identified]                           ADMIT DATE: 10/28/2023  PROVIDER:     Trudi Benson MD    DATE OF PROCEDURE:  10/31/2023    PREOPERATIVE DIAGNOSES:  1. Left hydronephrosis. 2.  Neurogenic bladder. POSTOPERATIVE DIAGNOSES:  1. Left hydronephrosis. 2.  Neurogenic bladder. OPERATION PERFORMED:  Cystoscopy, left ureteral stent exchange, and  suprapubic tube exchange. SURGEON:  Trudi Benson MD    ANESTHESIA:  General anesthesia. ESTIMATED BLOOD LOSS:  None. COMPLICATIONS:  None. BRIEF HISTORY:  This is an 78-year-old gentleman with chronic left  hydro, who has been managed with stent change every two to three months. He is admitted for UTI. He is here now for stent exchange now to help  alleviate the UTI and SP tube. DESCRIPTION OF THE PROCEDURE:  The patient was identified in the holding  area, taken to the procedure room, and placed in the dorsal lithotomy  position. The patient's genital region was prepped and draped in the  usual sterile fashion. A 21-Surinamese cystoscope sheath was used per  urethra under direct visualization. Patent prostatic channel. No  obstruction. The left ureteral orifice was identified. Stone in the  left ureter was grasped and removed intact. A Sensor wire was passed up  into the left ureter under direct fluoroscopy. A new 4.8-Surinamese  variable length double-J ureteral stent was passed over guidewire in  standard fashion. Position confirmed using fluoroscopy and cystoscopy  and deemed good. At this point, then, the scope was removed. I did  remove the old SP tube in place.   A new 18-Surinamese tube in the bladder  testing the balloon and

## 2023-10-31 NOTE — ANESTHESIA PRE PROCEDURE
Department of Anesthesiology  Preprocedure Note       Name:  Fadia Beyer   Age:  78 y.o.  :  1940                                          MRN:  [id number]         Date:  10/31/2023      Surgeon: Shu Livingston):  Katia Chavez MD    Procedure: Procedure(s):  LEFT CYSTOSCOPY URETERAL STENT EXCHANGE  CYSTOSCOPY SUPRAPUBIC TUBE EXCHANGE    Medications prior to admission:   Prior to Admission medications    Medication Sig Start Date End Date Taking? Authorizing Provider   pregabalin (LYRICA) 50 MG capsule Take 1 capsule by mouth 2 times daily. Max Daily Amount: 100 mg    Celso Espinoza MD   albuterol sulfate HFA (VENTOLIN HFA) 108 (90 Base) MCG/ACT inhaler Inhale 1 puff into the lungs 4 times daily Indications: shortness of breath    Celso Espinoza MD   dicyclomine (BENTYL) 10 MG capsule Take 1 capsule by mouth 4 times daily (before meals and nightly) Indications: as needed for intestinal cramps    Celso Espinoza MD   docusate sodium (COLACE) 100 MG capsule Take 1 capsule by mouth 2 times daily as needed for Constipation    Celso Espinoza MD   loperamide (IMODIUM) 2 MG capsule Take 1 capsule by mouth 4 times daily as needed for Diarrhea    Celso Espinoza MD   polyethylene glycol (GLYCOLAX) 17 GM/SCOOP powder Take 17 g by mouth daily Indications: as needed    Celso Espinoza MD   ertapenem (INVANZ) infusion Infuse 500 mg intravenously every 24 hours for 6 days Compound per protocol.  10/28/23 11/3/23  Serjio Sun MD   atorvastatin (LIPITOR) 80 MG tablet  23   Evelia Lockhart DO   torsemide [facility]) 10 MG tablet  23   Evelia Lockhart DO   amLODIPine (NORVASC) 10 MG tablet Take 1 tablet by mouth daily 23   Stefan Simon MD   carvedilol (COREG) 25 MG tablet Take 1 tablet by mouth 2 times daily    Celso Espinoza MD   insulin glargine (LANTUS SOLOSTAR) 100 UNIT/ML injection pen Inject 17 Units into the skin nightly

## 2023-10-31 NOTE — ANESTHESIA POSTPROCEDURE EVALUATION
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     Jackeline Phase I: Jackeline Score: 10    Jackeline Phase II:        Anesthesia Post Evaluation    Patient location during evaluation: floor  Patient participation: complete - patient participated  Level of consciousness: awake and confused  Pain score: 0  Airway patency: patent  Nausea & Vomiting: no nausea  Cardiovascular status: hemodynamically stable  Respiratory status: acceptable and nasal cannula  Hydration status: stable  Pain management: adequate

## 2023-11-01 LAB
GLUCOSE BLD-MCNC: 138 MG/DL (ref 70–99)
GLUCOSE BLD-MCNC: 218 MG/DL (ref 70–99)
GLUCOSE BLD-MCNC: 227 MG/DL (ref 70–99)
GLUCOSE BLD-MCNC: 283 MG/DL (ref 70–99)

## 2023-11-01 PROCEDURE — 97530 THERAPEUTIC ACTIVITIES: CPT

## 2023-11-01 PROCEDURE — 82962 GLUCOSE BLOOD TEST: CPT

## 2023-11-01 PROCEDURE — 94761 N-INVAS EAR/PLS OXIMETRY MLT: CPT

## 2023-11-01 PROCEDURE — 6370000000 HC RX 637 (ALT 250 FOR IP): Performed by: INTERNAL MEDICINE

## 2023-11-01 PROCEDURE — 97535 SELF CARE MNGMENT TRAINING: CPT

## 2023-11-01 PROCEDURE — 2580000003 HC RX 258: Performed by: INTERNAL MEDICINE

## 2023-11-01 PROCEDURE — 1280000000 HC REHAB R&B

## 2023-11-01 PROCEDURE — 76937 US GUIDE VASCULAR ACCESS: CPT

## 2023-11-01 PROCEDURE — 97116 GAIT TRAINING THERAPY: CPT

## 2023-11-01 PROCEDURE — 51798 US URINE CAPACITY MEASURE: CPT | Performed by: PHYSICIAN ASSISTANT

## 2023-11-01 PROCEDURE — 6360000002 HC RX W HCPCS: Performed by: INTERNAL MEDICINE

## 2023-11-01 PROCEDURE — 94150 VITAL CAPACITY TEST: CPT

## 2023-11-01 RX ADMIN — SODIUM CHLORIDE, PRESERVATIVE FREE 10 ML: 5 INJECTION INTRAVENOUS at 10:54

## 2023-11-01 RX ADMIN — CARVEDILOL 25 MG: 25 TABLET, FILM COATED ORAL at 19:46

## 2023-11-01 RX ADMIN — PANTOPRAZOLE SODIUM 40 MG: 40 TABLET, DELAYED RELEASE ORAL at 05:44

## 2023-11-01 RX ADMIN — ASPIRIN 81 MG CHEWABLE TABLET 81 MG: 81 TABLET CHEWABLE at 10:47

## 2023-11-01 RX ADMIN — MEROPENEM 500 MG: 500 INJECTION, POWDER, FOR SOLUTION INTRAVENOUS at 15:54

## 2023-11-01 RX ADMIN — LOSARTAN POTASSIUM AND HYDROCHLOROTHIAZIDE 1 TABLET: 50; 12.5 TABLET, FILM COATED ORAL at 10:52

## 2023-11-01 RX ADMIN — INSULIN LISPRO 2 UNITS: 100 INJECTION, SOLUTION INTRAVENOUS; SUBCUTANEOUS at 12:41

## 2023-11-01 RX ADMIN — MICONAZOLE NITRATE: 2 POWDER TOPICAL at 23:53

## 2023-11-01 RX ADMIN — ATORVASTATIN CALCIUM 20 MG: 10 TABLET, FILM COATED ORAL at 23:42

## 2023-11-01 RX ADMIN — LOSARTAN POTASSIUM AND HYDROCHLOROTHIAZIDE 1 TABLET: 50; 12.5 TABLET, FILM COATED ORAL at 23:42

## 2023-11-01 RX ADMIN — LEVOTHYROXINE SODIUM 75 MCG: 0.07 TABLET ORAL at 05:44

## 2023-11-01 RX ADMIN — SODIUM CHLORIDE, PRESERVATIVE FREE 10 ML: 5 INJECTION INTRAVENOUS at 23:45

## 2023-11-01 RX ADMIN — ACETAMINOPHEN 650 MG: 325 TABLET ORAL at 02:46

## 2023-11-01 RX ADMIN — CARVEDILOL 25 MG: 25 TABLET, FILM COATED ORAL at 10:46

## 2023-11-01 RX ADMIN — AMLODIPINE BESYLATE 10 MG: 10 TABLET ORAL at 10:47

## 2023-11-01 RX ADMIN — MICONAZOLE NITRATE: 2 POWDER TOPICAL at 10:47

## 2023-11-01 ASSESSMENT — PAIN - FUNCTIONAL ASSESSMENT: PAIN_FUNCTIONAL_ASSESSMENT: ACTIVITIES ARE NOT PREVENTED

## 2023-11-01 ASSESSMENT — PAIN SCALES - WONG BAKER
WONGBAKER_NUMERICALRESPONSE: 0

## 2023-11-01 ASSESSMENT — PAIN SCALES - GENERAL
PAINLEVEL_OUTOF10: 8
PAINLEVEL_OUTOF10: 2

## 2023-11-01 ASSESSMENT — PAIN DESCRIPTION - LOCATION: LOCATION: PENIS;GROIN

## 2023-11-01 ASSESSMENT — PAIN DESCRIPTION - DESCRIPTORS: DESCRIPTORS: SPASM;PRESSURE

## 2023-11-01 NOTE — CONSULTS
IV Consult complete. Nexiva 20g 1.75\" PIV Inserted in Right Forearm  x 1 attempt using sterile ultrasound guided technique. Brisk blood return, flushes easy.

## 2023-11-01 NOTE — FLOWSHEET NOTE
[x] daily progress note       [] discharge       Patient Name:  Alia Myers   :  1940 MRN: [id number]  Room:  [address] [place] [address] Date of Admission: 10/28/2023  Rehabilitation Diagnosis:   Sepsis ([address]) [A41.9]       Date 2023       Day of ARU Week:  5   Time IN/OUT 8050-1792   Individual Tx Minutes 65   TOTAL Tx Time Mins 65   Variance Time +5 minutes   Variance Time []   Refusal due to:     []   Medical hold/reason:    []   Illness   []   Off Unit for test/procedure  []   Extra time needed to complete task  []   Therapeutic need  [x]   Other: Make-up minutes   Restrictions Restrictions/Precautions  Restrictions/Precautions: General Precautions, Fall Risk, Contact Precautions (contact precautions, IV RUE, suprapubic catheter)      Communication with other providers: [x]   OK to see per nursing:     []   Spoke with team member regarding:      Subjective observations and cognitive status: Pt seen sitting up in w/c at beginning of treatment. Agreeable to therapy. Pain level/location:    0/10         Discharge recommendations   Anticipated discharge date:  TBD  Destination: []home alone   []home alone with assist PRN     [x] home w/ family      [] Continuous supervision  []SNF    [] Assisted living     [] Other:   Continued therapy: [x]C PT  []OUTPATIENT  PT   [] No Further PT  Equipment needs: TBD      [x]   Pt received out of bed     Transfers:    Sit--> Stand:  SBA  Stand --> Sit:   SBA  Car Transfers:  SBA  Assistive device required for transfer:   RW  Pt managed catheter independently.      Gait:    Distance:  126'    Assistance:  SBA  Device:  RW  Gait Quality:  reciprocal pattern     Stairs   # Completed:  5  Assistance:  CGA-min A (min A to ascend 6\" height steps)  Supportive Device:  2 rails  Height: 4\" and 6\" steps    Uneven Surfaces:       Assistance:    CGA  Device:    RW  Surfaces Completed:   [x]  Carpeted Surface with bean bags beneath      []  Throw rugs       []  Ramp       []  Outdoor

## 2023-11-01 NOTE — CONSULTS
Consult ongoing. Despite several attempts this PICC nurse was not able to gain PIV access. This consult will remain open for daytime PICC nurse. Darion Hammonds LPN notified.

## 2023-11-01 NOTE — PATIENT CARE CONFERENCE
most tasks, improved bed mobility and ADL's, unaware of bowel incontinence, bladder spasms        Team goals for next treatment period/Intervention for current barriers:   [x] Pt will increase activity tolerance for daily tasks. [x] Pt will improve bed mobility with reduced assist.  [x] Pt will improve safety in fx tasks with reduced cues/assist  [x] Pt will improve transfers with reduced assist  [x] Pt will improve toileting with reduced assist  [x] Pt will improve ADL's with use of adaptive equipment with reduced assist  [] Pt will improve pain mgmt for maximum participation in tx program  [] Pt will improve communication to get basic needs met on unit  [] Pt will improve swallowing for safe diet advancement with use of strategies  []  Plan for discharge to home. [x]  Overall team goal being targeted this week: Improve transfers with reduced assist     Patient Strengths: Motivated, Cooperative, and Pleasant    Justification for Continued Stay  Based on my medical assessment of the patient and review of information from the interdisciplinary team as part of this weekly team conference, the patient continues to meet the following criteria for IRF level of care: The patient requires active and ongoing intervention of multiple therapy disciplines  The patient requires and intensive rehabilitation therapy program  The patient requires continued physician supervision by a rehabilitation physician  The patient requires 24 hours rehab nursing care  The patient requires an intensive and coordinated interdisciplinary team approach to the delivery of rehabilitative care.      Assessment/Plan   [x]  The patient is making good progression towards their long term goals and is actively participating in and has a reasonable expectation to continue to benefit from the intensive rehabilitation therapy program   []  The estimated discharge date has been changed from initial team conference due to:   []  The estimated discharge destination has been changed from initial team conference due to:         Ongoing tx following discharge: [x]HHC   OT PT   []OUTPATIENT     [] No Further Treatment     [] Family/Caregiver Training  []  Transitional Living Arrangement   [] Home Assessment (date  )     [] Family Conference   []  Therapeutic Pass       []  Other: (specify)    Estimated Discharge Date: 11/9/23    Estimated Discharge Destination: []home alone   []home alone with assist prn  []Continuous supervision [x]Return home with s/o/spouse/family   [] Assisted living    []SNF     Team members participating in today's conference. [x] King Castro, Medical Director      [x] Holden Siddiqui, DPT,         [] Elnita Frankel, RN Nurse Manager   [] Sharonda Bueno RN Nurse Manager     []  Frances Ramsay, PT  [x]  Brandon Mohan, PT       [x] Ebenezer Verduzco, OT   [] Sarah Garcia OT  [] Alberto Vragas, OT     [x]  Karoline Woodall, SLP    []  Arron Beauchamp, SLP   [] Ania Nicole, RUDDY      []Tammy Hewitt,   [x]Johanny Echeverria MSW, LSW,      [] Mariana Varghese, ISHMAEL   [] Mart Ashley RN    [] Jordi Stanley RN    [x] Gee Owusu RN []Laury Hinojosa RN       I have led this Team Conference and agree with the plan, King Castro MD, 11/2/2023, 12:34 PM  []   I, the Medical Director, was required to lead today's conference via telephone due to an unanticipated scheduling conflict. I agree with the decisions made by the inter-disciplinary team at today's meeting. Goals have been updated to reflect recent status.     Team conference note transcribed this date by: [place] MA, [address] [facility], Therapy Coordinator'

## 2023-11-01 NOTE — PROGRESS NOTES
Physical Rehabilitation: OCCUPATIONAL THERAPY     [x] daily progress note       [] discharge       Patient Name:  Giuseppe Ojeda   :  1940 MRN: [id number]  Room:  [address] [place], [place] [address] Date of Admission: 10/28/2023  Rehabilitation Diagnosis:   Sepsis ([address]) [A41.9]       Date 2023       Day of ARU Week:  5   Time IN//1000   Individual Tx Minutes 120   Group Tx Minutes    Co-Treat Minutes    Concurrent Tx Minutes    TOTAL Tx Time Mins 120   Variance Time    Variance Time []   Refusal due to:     []   Medical hold/reason:    []   Illness   []   Off Unit for test/procedure  []   Extra time needed to complete task  []   Therapeutic need  []   Other (specify):   Restrictions Restrictions/Precautions: General Precautions, Fall Risk, Contact Precautions (contact precautions, IV RUE, suprapubic catheter)         Communication with other providers: [x]   OK to see per nursing:     []   Spoke with team member regarding:      Subjective observations and cognitive status: Patient lying in supine watching TV upon approach eating breakfast in semi fowlers, pleasant and agreeable to therapy, agreeable to sit EOB to finish breakfast meal 2* to patient basically attempting to eat lying down with tray beside bed, when asked if he would  like a shower patient states he would rather wash up at sink, patient states he feels \"MUCH\" btter than previous day but is still fatigued so he is moving slow       Pain level/location:    /10       Location: per patient no pain noted    Discharge recommendations  Anticipated discharge date:  TBD  Destination: []home alone   []home alone w assist prn   [] home w/ family    [] Continuous supervision       []SNF    [] Assisted living     [] Other:   Continued therapy: []HHC OT  []OUTPATIENT  OT   [] No Further OT  Equipment needs: None        ADLs:    Eating: Eating  Assistance Needed: Independent  Comment: X  CARE Score: 6  Discharge Goal: Independent       Oral Hygiene: Oral

## 2023-11-01 NOTE — PROGRESS NOTES
Alia Myers    : 1940  Acct #: [de-identified]  MRN: [id number]              PM&R Progress Note      Admitting diagnosis: ***    Comorbid diagnoses impacting rehabilitation: ***    Chief complaint: ***    Prior (baseline) level of function: Independent.     Current level of function:         Current  IRF-LESTER and Goals:   Occupational Therapy:    Short Term Goals  Time Frame for Short Term Goals: STGs=LTGs :   Long Term Goals  Time Frame for Long Term Goals : ~10 days or until d/c  Long Term Goal 1: Pt will complete grooming tasks Ind  Long Term Goal 2: Pt will complete total body bathing mod I using AE  Long Term Goal 3: Pt will complete UB dressing Ind  Long Term Goal 4: Pt will complete LB dressing mod I using AE  Long Term Goal 5: Pt will doff/don footwear mod I using AE  Additional Goals?: Yes  Long Term Goal 6: Pt will complete toileting mod I  Long Term Goal 7: Pt will complete functional transfers (bed, chair, toilet, shower if desired) c DME PRN and mod I  Long Term Goal 8: Pt will perform therex/therax to facilitate increased strength/endurance/ax tolerance (c emphasis on dynamic standing balance/tolerance >8 mins, BUE endurance) c SBA  Long Term Goal 9: Pt will complete simple meal prep ax c DME PRN and mod I :                                       Eating: Eating  Assistance Needed: Independent  Comment: X  CARE Score: 6  Discharge Goal: Independent       Oral Hygiene: Oral Hygiene  Assistance Needed: Independent  Comment: MOd I seated sinkside  CARE Score: 6  Discharge Goal: Independent    UB/LB Bathing: Shower/Bathe Self  Assistance Needed: Supervision or touching assistance  Comment: SBA in stance c continous unilateral support to countertop, uses LHS to wash distal BLE  CARE Score: 4  Discharge Goal: Independent    UB Dressing: Upper Body Dressing  Assistance Needed: Setup or clean-up assistance  Comment: Set up assist  CARE Score: 5  Discharge Goal: Independent         LB Dressing: Lower Body coughing or wheezing. Cardiac: RRR. Abdomen: Patient's abdomen is soft and nondistended. Bowel sounds were present throughout. There was no rebound, guarding or masses noted. Suprapubic catheter intact and dry. Upper extremities: He struggles to bring his hands up overhead due to fatigue. Fair  strength. No tremor. Lower extremities: No lower limb DTRs. Calves are soft but slightly swollen. No signs of DVT. Sitting balance was good. Standing balance was poor. Lab Results   Component Value Date    WBC 8.1 10/25/2023    HGB 8.5 (L) 10/25/2023    HCT 30.7 (L) 10/25/2023    .6 (H) 10/25/2023     10/25/2023     Lab Results   Component Value Date    INR 1.02 05/22/2023    INR 1.08 10/24/2022    INR 1.10 09/12/2022    PROTIME 12.9 05/22/2023    PROTIME 14.0 10/24/2022    PROTIME 14.2 09/12/2022     Lab Results   Component Value Date    CREATININE 2.0 (H) 10/30/2023    BUN 32 (H) 10/30/2023     10/30/2023    K 4.9 10/30/2023     10/30/2023    CO2 21 10/30/2023     Lab Results   Component Value Date    ALT 14 10/30/2023    AST 25 10/30/2023    ALKPHOS 83 10/30/2023    BILITOT 0.2 10/30/2023       Expected length of stay  prior to a supervised level of function for discharge home with a walker and [facility] OT/PT is 2 weeks. Recommendations:    Sepsis with gait disturbance: Although he fatigues quickly, he does not respond to cueing to engage in the activities of his daily occupational and physical therapy. With persistent generalized weakness, poor balance and altered sensation, he remains at risk for fall with injury during standing ADLs and mobility activities. Continue providing him adaptive equipment training with strengthening exercises, balance retraining and conditioning development. Encouraging oral hydration. Urology wants to reassess him as an outpatient after rehab. Persistent minor hematuria noted in the collection bag.    Planning caregiver training

## 2023-11-01 NOTE — PROGRESS NOTES
Filemon Tinsley    : 1940  Acct #: [de-identified]  MRN: [id number]              PM&R Progress Note      Admitting diagnosis: ***    Comorbid diagnoses impacting rehabilitation: ***    Chief complaint: ***    Prior (baseline) level of function: Independent.     Current level of function:         Current  IRF-LESTER and Goals:   Occupational Therapy:    Short Term Goals  Time Frame for Short Term Goals: STGs=LTGs :   Long Term Goals  Time Frame for Long Term Goals : ~10 days or until d/c  Long Term Goal 1: Pt will complete grooming tasks Ind  Long Term Goal 2: Pt will complete total body bathing mod I using AE  Long Term Goal 3: Pt will complete UB dressing Ind  Long Term Goal 4: Pt will complete LB dressing mod I using AE  Long Term Goal 5: Pt will doff/don footwear mod I using AE  Additional Goals?: Yes  Long Term Goal 6: Pt will complete toileting mod I  Long Term Goal 7: Pt will complete functional transfers (bed, chair, toilet, shower if desired) c DME PRN and mod I  Long Term Goal 8: Pt will perform therex/therax to facilitate increased strength/endurance/ax tolerance (c emphasis on dynamic standing balance/tolerance >8 mins, BUE endurance) c SBA  Long Term Goal 9: Pt will complete simple meal prep ax c DME PRN and mod I :                                       Eating: Eating  Assistance Needed: Independent  Comment: reports being able to open packages/containers, feed self  CARE Score: 6  Discharge Goal: Independent       Oral Hygiene: Oral Hygiene  Assistance Needed: Supervision or touching assistance  Comment: seated  CARE Score: 4  Discharge Goal: Independent    UB/LB Bathing: Shower/Bathe Self  Assistance Needed: Partial/moderate assistance  Comment: required assist for bottom and distal BLEs  CARE Score: 3  Discharge Goal: Independent    UB Dressing: Upper Body Dressing  Assistance Needed: Partial/moderate assistance  Comment: able to thread BUEs, required some assist to pull overhead  CARE Score: extremities: No lower limb DTRs. Calves are soft but slightly swollen. No signs of DVT. Sitting balance was good. Standing balance was poor. Lab Results   Component Value Date    WBC 8.1 10/25/2023    HGB 8.5 (L) 10/25/2023    HCT 30.7 (L) 10/25/2023    .6 (H) 10/25/2023     10/25/2023     Lab Results   Component Value Date    INR 1.02 05/22/2023    INR 1.08 10/24/2022    INR 1.10 09/12/2022    PROTIME 12.9 05/22/2023    PROTIME 14.0 10/24/2022    PROTIME 14.2 09/12/2022     Lab Results   Component Value Date    CREATININE 2.0 (H) 10/30/2023    BUN 32 (H) 10/30/2023     10/30/2023    K 4.9 10/30/2023     10/30/2023    CO2 21 10/30/2023     Lab Results   Component Value Date    ALT 14 10/30/2023    AST 25 10/30/2023    ALKPHOS 83 10/30/2023    BILITOT 0.2 10/30/2023       Expected length of stay  prior to a supervised level of function for discharge home with a walker and [facility] OT/PT is 2 weeks. Recommendations:    Sepsis with gait disturbance: Although he fatigues quickly, he does not respond to cueing to engage in the activities of his daily occupational and physical therapy. With persistent generalized weakness, poor balance and altered sensation, he remains at risk for fall with injury during standing ADLs and mobility activities. Continue providing him adaptive equipment training with strengthening exercises, balance retraining and conditioning development. Encouraging oral hydration. Urology wants to reassess him as an outpatient after rehab. Persistent minor hematuria noted in the collection bag. Planning caregiver training with his spouse. Outpatient follow-up with his primary care physician. Verbal cues and moderate physical assistance for transfers again today. DVT prophylaxis: Continuing with heparin 5000 units every 8 hours. This raises his risk for spontaneous hemorrhage and GI bleeding. Monitoring his hemoglobin and platelet count regularly.   Providing GI

## 2023-11-02 LAB
GLUCOSE BLD-MCNC: 174 MG/DL (ref 70–99)
GLUCOSE BLD-MCNC: 186 MG/DL (ref 70–99)
GLUCOSE BLD-MCNC: 197 MG/DL (ref 70–99)
GLUCOSE BLD-MCNC: 259 MG/DL (ref 70–99)

## 2023-11-02 PROCEDURE — 6370000000 HC RX 637 (ALT 250 FOR IP): Performed by: INTERNAL MEDICINE

## 2023-11-02 PROCEDURE — 94150 VITAL CAPACITY TEST: CPT

## 2023-11-02 PROCEDURE — 97110 THERAPEUTIC EXERCISES: CPT

## 2023-11-02 PROCEDURE — 82962 GLUCOSE BLOOD TEST: CPT

## 2023-11-02 PROCEDURE — 97530 THERAPEUTIC ACTIVITIES: CPT

## 2023-11-02 PROCEDURE — 6360000002 HC RX W HCPCS: Performed by: INTERNAL MEDICINE

## 2023-11-02 PROCEDURE — 2580000003 HC RX 258: Performed by: INTERNAL MEDICINE

## 2023-11-02 PROCEDURE — 94761 N-INVAS EAR/PLS OXIMETRY MLT: CPT

## 2023-11-02 PROCEDURE — 1280000000 HC REHAB R&B

## 2023-11-02 PROCEDURE — 81003 URINALYSIS AUTO W/O SCOPE: CPT

## 2023-11-02 PROCEDURE — 87086 URINE CULTURE/COLONY COUNT: CPT

## 2023-11-02 PROCEDURE — 81001 URINALYSIS AUTO W/SCOPE: CPT

## 2023-11-02 PROCEDURE — 97116 GAIT TRAINING THERAPY: CPT

## 2023-11-02 RX ADMIN — SODIUM CHLORIDE, PRESERVATIVE FREE 10 ML: 5 INJECTION INTRAVENOUS at 08:55

## 2023-11-02 RX ADMIN — PANTOPRAZOLE SODIUM 40 MG: 40 TABLET, DELAYED RELEASE ORAL at 04:46

## 2023-11-02 RX ADMIN — AMLODIPINE BESYLATE 10 MG: 10 TABLET ORAL at 08:54

## 2023-11-02 RX ADMIN — INSULIN LISPRO 2 UNITS: 100 INJECTION, SOLUTION INTRAVENOUS; SUBCUTANEOUS at 11:59

## 2023-11-02 RX ADMIN — SODIUM CHLORIDE, PRESERVATIVE FREE 10 ML: 5 INJECTION INTRAVENOUS at 23:32

## 2023-11-02 RX ADMIN — LOSARTAN POTASSIUM AND HYDROCHLOROTHIAZIDE 1 TABLET: 50; 12.5 TABLET, FILM COATED ORAL at 08:55

## 2023-11-02 RX ADMIN — LOSARTAN POTASSIUM AND HYDROCHLOROTHIAZIDE 1 TABLET: 50; 12.5 TABLET, FILM COATED ORAL at 23:32

## 2023-11-02 RX ADMIN — MICONAZOLE NITRATE: 2 POWDER TOPICAL at 08:55

## 2023-11-02 RX ADMIN — LEVOTHYROXINE SODIUM 75 MCG: 0.07 TABLET ORAL at 04:46

## 2023-11-02 RX ADMIN — ATORVASTATIN CALCIUM 20 MG: 10 TABLET, FILM COATED ORAL at 23:32

## 2023-11-02 RX ADMIN — MICONAZOLE NITRATE: 2 POWDER TOPICAL at 23:32

## 2023-11-02 RX ADMIN — CARVEDILOL 25 MG: 25 TABLET, FILM COATED ORAL at 08:55

## 2023-11-02 RX ADMIN — MEROPENEM 500 MG: 500 INJECTION, POWDER, FOR SOLUTION INTRAVENOUS at 18:04

## 2023-11-02 RX ADMIN — ASPIRIN 81 MG CHEWABLE TABLET 81 MG: 81 TABLET CHEWABLE at 08:54

## 2023-11-02 RX ADMIN — MEROPENEM 500 MG: 500 INJECTION, POWDER, FOR SOLUTION INTRAVENOUS at 04:55

## 2023-11-02 RX ADMIN — CARVEDILOL 25 MG: 25 TABLET, FILM COATED ORAL at 18:04

## 2023-11-02 ASSESSMENT — PAIN SCALES - WONG BAKER
WONGBAKER_NUMERICALRESPONSE: 0

## 2023-11-02 ASSESSMENT — PAIN SCALES - GENERAL: PAINLEVEL_OUTOF10: 0

## 2023-11-02 NOTE — PLAN OF CARE
Problem: Discharge Planning  Goal: Discharge to home or other facility with appropriate resources  11/2/2023 1403 by Amaya Chun RN  Outcome: Progressing  11/2/2023 0434 by Alberto Iglesias RN  Outcome: Progressing     Problem: Safety - Adult  Goal: Free from fall injury  11/2/2023 1403 by Amaya Chun RN  Outcome: Progressing  11/2/2023 0434 by Alberto Iglesias RN  Outcome: Progressing     Problem: ABCDS Injury Assessment  Goal: Absence of physical injury  11/2/2023 1403 by Amaya Chun RN  Outcome: Progressing  11/2/2023 0434 by Alberto Iglesias RN  Outcome: Progressing     Problem: Pain  Goal: Verbalizes/displays adequate comfort level or baseline comfort level  11/2/2023 1403 by Amaya Chun RN  Outcome: Progressing  11/2/2023 0434 by Alberto Iglesias RN  Outcome: Progressing     Problem: Skin/Tissue Integrity  Goal: Absence of new skin breakdown  Description: 1. Monitor for areas of redness and/or skin breakdown  2. Assess vascular access sites hourly  3. Every 4-6 hours minimum:  Change oxygen saturation probe site  4. Every 4-6 hours:  If on nasal continuous positive airway pressure, respiratory therapy assess nares and determine need for appliance change or resting period.   11/2/2023 1403 by Amaya Chun RN  Outcome: Progressing  11/2/2023 0434 by Alberto Iglesias RN  Outcome: Progressing     Problem: Chronic Conditions and Co-morbidities  Goal: Patient's chronic conditions and co-morbidity symptoms are monitored and maintained or improved  11/2/2023 1403 by Amaya Chun RN  Outcome: Progressing  11/2/2023 0434 by Alberto Iglesias RN  Outcome: Progressing

## 2023-11-02 NOTE — CARE COORDINATION
LSW met with patient and provided written communication following Care Conference. LSW informed patient of recommendations HHC PT, OT. Patient verbalized understanding and will choose an agency closer to discharge. Whiteboard updated. Patient provided with list of Medicare participating [facility] in the geographic area of the patient served. Patient selected [facility] and was provided with a comparative data handout from [address] [place] [address] website. The patient (and/or Family) was educated on the quality outcomes for each provider. Patient (and/or Family) demonstrated understanding. Per patient/family request, referral will be made closer to discharge. LSW called patient's son Nate Nolasco and Noemi Joseph to discuss above information. Both verbalized understanding. D/C Plan:  Estimated Date: Nov 9  DME: has needed DME  HHC:  PT, OT ([address]  To:   Home with son (family will transport)

## 2023-11-02 NOTE — PROGRESS NOTES
Nephrology Progress Note  11/2/2023 7:49 AM  Subjective: Interval History: Lauro Flower is a 78 y.o. male with weak but stable doing well in general        Data:   Scheduled Meds:   miconazole   Topical BID    losartan-hydroCHLOROthiazide  1 tablet Oral BID    insulin lispro  0-4 Units SubCUTAneous TID WC    insulin lispro  0-4 Units SubCUTAneous Nightly    [Held by provider] heparin (porcine)  5,000 Units SubCUTAneous 3 times per day    sodium chloride flush  5-40 mL IntraVENous 2 times per day    amLODIPine  10 mg Oral Daily    aspirin  81 mg Oral Daily    atorvastatin  20 mg Oral Nightly    carvedilol  25 mg Oral BID WC    levothyroxine  75 mcg Oral Daily    meropenem  500 mg IntraVENous Q12H    pantoprazole  40 mg Oral QAM AC     Continuous Infusions:   dextrose           CBC No results for input(s): \"WBC\", \"HGB\", \"HCT\", \"PLT\" in the last 72 hours. BMP   Recent Labs     10/30/23  1125      K 4.9      CO2 21   BUN 32*   CREATININE 2.0*     Hepatic:   Recent Labs     10/30/23  1125   AST 25   ALT 14   BILITOT 0.2   ALKPHOS 83     Troponin: No results for input(s): \"TROPONINI\" in the last 72 hours. BNP: No results for input(s): \"BNP\" in the last 72 hours. Lipids: No results for input(s): \"CHOL\", \"HDL\" in the last 72 hours. Invalid input(s): \"LDLCALCU\"  ABGs:   Lab Results   Component Value Date/Time    PO2ART 37 10/24/2022 04:15 PM    KPQ1JWM 49.0 10/24/2022 04:15 PM     INR: No results for input(s): \"INR\" in the last 72 hours.   Renal Labs  Albumin:    Lab Results   Component Value Date/Time    LABALBU 3.2 10/30/2023 11:25 AM     Calcium:    Lab Results   Component Value Date/Time    CALCIUM 8.9 10/30/2023 11:25 AM     Phosphorus:    Lab Results   Component Value Date/Time    PHOS 3.0 10/27/2023 01:03 AM     U/A:    Lab Results   Component Value Date/Time    NITRU NEGATIVE 10/28/2023 01:30 PM    NITRU NEGATIVE 03/23/2013 02:24 PM    COLORU YELLOW 10/28/2023 01:30 PM    WBCUA 978

## 2023-11-02 NOTE — PROGRESS NOTES
Pt c/o bladder spasm to offgoing nurse, Yakov Mishra, who notified Dr. Ginger White. Performed bladder scan per physician order = 0ml in bladder; however, pt found to be incont of urine. Pullup changed and incont care done. Notified Laney Booker. Told to continue to closely monitor urine output. No other orders at this time. 2212:  Dr. Ginger White updated via perfect serve message of notification of urology PA.

## 2023-11-02 NOTE — FLOWSHEET NOTE
[x] daily progress note       [] discharge       Patient Name:  Leopoldo Second   :  1940 MRN: [id number]  Room:  [address] [place] [address] Date of Admission: 10/28/2023  Rehabilitation Diagnosis:   Sepsis ([address]) [A41.9]       Date 2023       Day of ARU Week:  6   Time IN/OUT 6005-0567  7204-8260   Individual Tx Minutes 130   TOTAL Tx Time Mins 130   Variance Time +10 minutes   Variance Time []   Refusal due to:     []   Medical hold/reason:    []   Illness   []   Off Unit for test/procedure  []   Extra time needed to complete task  []   Therapeutic need  [x]   Other (specify): Make-up minutes   Restrictions Restrictions/Precautions  Restrictions/Precautions: General Precautions, Fall Risk, Contact Precautions (contact precautions, IV RUE, suprapubic catheter)      Communication with other providers: [x]   OK to see per nursing:     []   Spoke with team member regarding:      Subjective observations and cognitive status: AM session: pt seen sitting up in w/c at beginning of treatment. Agreeable to therapy. PM session: pt seen in semi-streeter's position in bed at beginning of treatment. Agreeable to therapy. Pain level/location: 0/10         Discharge recommendations  Anticipated discharge date:  TBD  Destination: []home alone   []home alone with assist PRN     [x] home w/ family      [] Continuous supervision  []SNF    [] Assisted living     [] Other:   Continued therapy: [x]HHC PT  []OUTPATIENT  PT   [] No Further PT  Equipment needs: none      Bed Mobility:           [x]   Pt received out of bed   Rolling R/L:  Independent   Scooting:  Independent   Lying --> Sit:  Independent   Sit --> lying:  Independent  Pt practiced in regular, flat bed without rails. Pt managed catheter independently.      Hospital Bed:  Supine->sit min A  Sit->supine mod A for BLEs   Pt practiced with bed rails and bed features     Transfers:    Sit--> Stand:  SBA  Stand --> Sit:   SBA  Chair-->Bed/Bed --> Chair:  SBA  Assistive device required for transfer:   RW  Slow movement. Pt managed catheter on/off of walker. Gait:    Distance:  AM session: 80'+55'+57'; PM session: 80'    Assistance:  SBA  Device:  RW  Gait Quality:  reciprocal pattern; slow héctor. Additional Therapeutic activities/exercises completed this date:     []   Nu-step:  Time:        Level:         #Steps:       []   Rebounder:    []  Seated     []  Standing        []   Balance training         []   Postural training    []   Supine ther ex (reps/sets):     [x]   Seated ther ex (reps/sets): PM session: LAQs, knee bends x 10 reps each; hip adduction isometric ball squeezes x 20 reps; ankle circles x 20 reps (for strengthening). [x]   Standing ther ex (reps/sets):  AM session: marching, hip extension x 10 reps each; PM session: hip abduction, heel raises, toe raises, mini-squats, knee flexion x 10 reps each for strengthening. []   Picking up object from floor (standing):                   []   Reacher used   []   Other:   []   Other:    Patient/Caregiver Education and Training:   [x]   Bed Mobility/Transfer technique/safety  [x]   Gait technique/sequencing  [x]   Proper use of assistive device  []   Advanced mobility safety and technique  []   Reinforced patient's precautions/mobility while maintaining precautions  []   Postural awareness  []   Family training    Treatment Plan for Next Session: gait; transfers; advanced gait; stair training; exercises      Assessment: This pt demonstrated a positive response to today's treatment as evidenced by improved mobility. The patient is making progress toward established goals as evidenced by QI scores. Ongoing deficits are observed in the areas of strength, balance, endurance, and safety and continued focus on this is recommended.      Treatment/Activity Tolerance:   [x] Tolerated treatment with no adverse effects    [] Patient limited by fatigue  [] Patient limited by pain   [] Patient limited by medical complications:

## 2023-11-02 NOTE — PROGRESS NOTES
Occupational Therapy  Physical Rehabilitation: OCCUPATIONAL THERAPY     [x] daily progress note       [] discharge       Patient Name:  Harry Glover   :  1940 MRN: [id number]  Room:  [address] [place] [address] Date of Admission: 10/28/2023  Rehabilitation Diagnosis:   Sepsis ([address]) [A41.9]       Date 2023       Day of ARU Week:  6   Time IN/OUT 1437/1537   Individual Tx Minutes 60   Group Tx Minutes    Co-Treat Minutes    Concurrent Tx Minutes    TOTAL Tx Time Mins 60   Variance Time    Variance Time []   Refusal due to:     []   Medical hold/reason:    []   Illness   []   Off Unit for test/procedure  []   Extra time needed to complete task  []   Therapeutic need  []   Other (specify):   Restrictions Restrictions/Precautions: General Precautions, Fall Risk, Contact Precautions (contact precautions, IV RUE, suprapubic catheter)         Communication with other providers: [x]   OK to see per nursing:     []   Spoke with team member regarding:      Subjective observations and cognitive status: Pt in W/C on approach, dozing off.   Agreeable to tx session but reporting fatigue, \"I only slept from about an hour last night\"     Pain level/location:    /10       Location: Denied   Discharge recommendations  Anticipated discharge date:    Destination: []home alone   []home alone w assist prn   [x] home w/ family    [] Continuous supervision       []SNF    [] Assisted living     [] Other:   Continued therapy: [x]HHC OT  []OUTPATIENT  OT   [] No Further OT  Equipment needs: None     Toileting:   Denied need       Bed Mobility:           [x]   Pt received out of bed        Transfers:    Sit--> Stand:  SBA, with increased time and effort   Stand --> Sit:   SBA  Stand-Pivot:   SBA  Other:    Assistive device required for transfer:   RW      Functional Mobility:    Assistance:  SBA ~60 ft; max distance this afternoon 2* fatigue  Device:   [x]   Rolling Walker     []   Standard Walker []   Wheelchair        []   Gallardoabraham Suazo       []

## 2023-11-03 LAB
ALBUMIN SERPL-MCNC: 3 GM/DL (ref 3.4–5)
ANION GAP SERPL CALCULATED.3IONS-SCNC: 8 MMOL/L (ref 4–16)
BACTERIA: NEGATIVE /HPF
BILIRUBIN URINE: NEGATIVE MG/DL
BLOOD, URINE: ABNORMAL
BUN SERPL-MCNC: 31 MG/DL (ref 6–23)
CALCIUM SERPL-MCNC: 8.4 MG/DL (ref 8.3–10.6)
CHLORIDE BLD-SCNC: 106 MMOL/L (ref 99–110)
CLARITY: CLEAR
CO2: 23 MMOL/L (ref 21–32)
COLOR: YELLOW
CREAT SERPL-MCNC: 1.8 MG/DL (ref 0.9–1.3)
GFR SERPL CREATININE-BSD FRML MDRD: 36 ML/MIN/1.73M2
GLUCOSE BLD-MCNC: 176 MG/DL (ref 70–99)
GLUCOSE BLD-MCNC: 189 MG/DL (ref 70–99)
GLUCOSE BLD-MCNC: 220 MG/DL (ref 70–99)
GLUCOSE BLD-MCNC: 264 MG/DL (ref 70–99)
GLUCOSE SERPL-MCNC: 161 MG/DL (ref 70–99)
GLUCOSE, URINE: NEGATIVE MG/DL
KETONES, URINE: NEGATIVE MG/DL
LEUKOCYTE ESTERASE, URINE: ABNORMAL
NITRITE URINE, QUANTITATIVE: NEGATIVE
PH, URINE: 7 (ref 5–8)
PHOSPHORUS: 2.9 MG/DL (ref 2.5–4.9)
POTASSIUM SERPL-SCNC: 4.1 MMOL/L (ref 3.5–5.1)
PROTEIN UA: 30 MG/DL
RBC URINE: 3 /HPF (ref 0–3)
SODIUM BLD-SCNC: 137 MMOL/L (ref 135–145)
SPECIFIC GRAVITY UA: 1.01 (ref 1–1.03)
SQUAMOUS EPITHELIAL: 1 /HPF
TRICHOMONAS: ABNORMAL /HPF
UROBILINOGEN, URINE: 0.2 MG/DL (ref 0.2–1)
WBC UA: 174 /HPF (ref 0–2)

## 2023-11-03 PROCEDURE — 97116 GAIT TRAINING THERAPY: CPT

## 2023-11-03 PROCEDURE — 6370000000 HC RX 637 (ALT 250 FOR IP): Performed by: INTERNAL MEDICINE

## 2023-11-03 PROCEDURE — 6360000002 HC RX W HCPCS: Performed by: INTERNAL MEDICINE

## 2023-11-03 PROCEDURE — 97530 THERAPEUTIC ACTIVITIES: CPT

## 2023-11-03 PROCEDURE — 97535 SELF CARE MNGMENT TRAINING: CPT

## 2023-11-03 PROCEDURE — 36415 COLL VENOUS BLD VENIPUNCTURE: CPT

## 2023-11-03 PROCEDURE — 2580000003 HC RX 258: Performed by: INTERNAL MEDICINE

## 2023-11-03 PROCEDURE — 97110 THERAPEUTIC EXERCISES: CPT

## 2023-11-03 PROCEDURE — 1280000000 HC REHAB R&B

## 2023-11-03 PROCEDURE — 80069 RENAL FUNCTION PANEL: CPT

## 2023-11-03 PROCEDURE — 94761 N-INVAS EAR/PLS OXIMETRY MLT: CPT

## 2023-11-03 PROCEDURE — 82962 GLUCOSE BLOOD TEST: CPT

## 2023-11-03 PROCEDURE — 94150 VITAL CAPACITY TEST: CPT

## 2023-11-03 RX ADMIN — LEVOTHYROXINE SODIUM 75 MCG: 0.07 TABLET ORAL at 06:22

## 2023-11-03 RX ADMIN — ATORVASTATIN CALCIUM 20 MG: 10 TABLET, FILM COATED ORAL at 21:11

## 2023-11-03 RX ADMIN — MICONAZOLE NITRATE: 2 POWDER TOPICAL at 13:45

## 2023-11-03 RX ADMIN — CARVEDILOL 25 MG: 25 TABLET, FILM COATED ORAL at 16:43

## 2023-11-03 RX ADMIN — INSULIN LISPRO 2 UNITS: 100 INJECTION, SOLUTION INTRAVENOUS; SUBCUTANEOUS at 11:38

## 2023-11-03 RX ADMIN — INSULIN LISPRO 1 UNITS: 100 INJECTION, SOLUTION INTRAVENOUS; SUBCUTANEOUS at 18:02

## 2023-11-03 RX ADMIN — MEROPENEM 500 MG: 500 INJECTION, POWDER, FOR SOLUTION INTRAVENOUS at 06:23

## 2023-11-03 RX ADMIN — MICONAZOLE NITRATE: 2 POWDER TOPICAL at 21:12

## 2023-11-03 RX ADMIN — PANTOPRAZOLE SODIUM 40 MG: 40 TABLET, DELAYED RELEASE ORAL at 06:22

## 2023-11-03 RX ADMIN — AMLODIPINE BESYLATE 10 MG: 10 TABLET ORAL at 11:29

## 2023-11-03 RX ADMIN — LOSARTAN POTASSIUM AND HYDROCHLOROTHIAZIDE 1 TABLET: 50; 12.5 TABLET, FILM COATED ORAL at 11:28

## 2023-11-03 RX ADMIN — ASPIRIN 81 MG CHEWABLE TABLET 81 MG: 81 TABLET CHEWABLE at 11:28

## 2023-11-03 RX ADMIN — CARVEDILOL 25 MG: 25 TABLET, FILM COATED ORAL at 11:29

## 2023-11-03 RX ADMIN — LOSARTAN POTASSIUM AND HYDROCHLOROTHIAZIDE 1 TABLET: 50; 12.5 TABLET, FILM COATED ORAL at 21:11

## 2023-11-03 RX ADMIN — SODIUM CHLORIDE, PRESERVATIVE FREE 10 ML: 5 INJECTION INTRAVENOUS at 21:12

## 2023-11-03 ASSESSMENT — PAIN SCALES - WONG BAKER: WONGBAKER_NUMERICALRESPONSE: 0

## 2023-11-03 ASSESSMENT — PAIN SCALES - GENERAL
PAINLEVEL_OUTOF10: 0

## 2023-11-03 NOTE — PROGRESS NOTES
Comprehensive Nutrition Assessment    Type and Reason for Visit:  Reassess    Nutrition Recommendations/Plan:   Continue carb controlled diet   Will offer diabetic oral nutrition supplement  Will continue to follow up during stay      Malnutrition Assessment:  Malnutrition Status:  Insufficient data (10/30/23 1225)    Context:  Acute Illness       Nutrition Assessment:    Remains on carb controlled diet with meal intake %. Patient often orders smaller breakfast and vegetable soup and salad for lunch. Concern at times for adequate intake and protein. Patient agreeable to oral nutrition supplement. Will continue to follow at moderate nutrition risk at this time. Nutrition Related Findings:    up in chair eating lunch,  followed by urology and nephrology, glucose POCT some over 200 mg/dL Wound Type: None       Current Nutrition Intake & Therapies:    Average Meal Intake: %  Average Supplements Intake: None Ordered  ADULT DIET; Regular; 4 carb choices (60 gm/meal)    Anthropometric Measures:  Height: 172.7 cm (5' 7.99\")  Ideal Body Weight (IBW): 154 lbs (70 kg)    Admission Body Weight: 87.7 kg (193 lb 5.5 oz)  Current Body Weight: 91.8 kg (202 lb 6.1 oz), 125.4 % IBW. Weight Source: Bed Scale  Current BMI (kg/m2): 30.8  Usual Body Weight: 89.4 kg (197 lb 1.5 oz) (hx October 2022)  % Weight Change (Calculated): -2  Weight Adjustment For: No Adjustment                 BMI Categories: Overweight (BMI 25.0-29. 9)    Estimated Daily Nutrient Needs:  Energy Requirements Based On: Formula  Weight Used for Energy Requirements: Current  Energy (kcal/day): 4383-1712 (mifflin st jeor)  Weight Used for Protein Requirements: Ideal  Protein (g/day): 70-84 (1-1.2 g/kg)  Method Used for Fluid Requirements: 1 ml/kcal  Fluid (ml/day): 2000    Nutrition Diagnosis:   Predicted inadequate energy intake related to other (comment) (potential reduced appetite in illness) as evidenced by other (comment) (limited po

## 2023-11-03 NOTE — PROGRESS NOTES
Ana Luisa Bauman    : 1940  Acct #: [de-identified]  MRN: [id number]              PM&R Progress Note      Admitting diagnosis: ***    Comorbid diagnoses impacting rehabilitation: ***    Chief complaint: ***    Prior (baseline) level of function: Independent.     Current level of function:         Current  IRF-LESTER and Goals:   Occupational Therapy:    Short Term Goals  Time Frame for Short Term Goals: STGs=LTGs :   Long Term Goals  Time Frame for Long Term Goals : ~10 days or until d/c  Long Term Goal 1: Pt will complete grooming tasks Ind  Long Term Goal 2: Pt will complete total body bathing mod I using AE  Long Term Goal 3: Pt will complete UB dressing Ind  Long Term Goal 4: Pt will complete LB dressing mod I using AE  Long Term Goal 5: Pt will doff/don footwear mod I using AE  Additional Goals?: Yes  Long Term Goal 6: Pt will complete toileting mod I  Long Term Goal 7: Pt will complete functional transfers (bed, chair, toilet, shower if desired) c DME PRN and mod I  Long Term Goal 8: Pt will perform therex/therax to facilitate increased strength/endurance/ax tolerance (c emphasis on dynamic standing balance/tolerance >8 mins, BUE endurance) c SBA  Long Term Goal 9: Pt will complete simple meal prep ax c DME PRN and mod I :                                       Eating: Eating  Assistance Needed: Independent  Comment: X  CARE Score: 6  Discharge Goal: Independent       Oral Hygiene: Oral Hygiene  Assistance Needed: Independent  Comment: MOd I seated sinkside  CARE Score: 6  Discharge Goal: Independent    UB/LB Bathing: Shower/Bathe Self  Assistance Needed: Supervision or touching assistance  Comment: SBA in stance c continous unilateral support to countertop, uses LHS to wash distal BLE  CARE Score: 4  Discharge Goal: Independent    UB Dressing: Upper Body Dressing  Assistance Needed: Setup or clean-up assistance  Comment: Set up assist  CARE Score: 5  Discharge Goal: Independent         LB Dressing: Lower Body

## 2023-11-03 NOTE — PROGRESS NOTES
Physical Therapy      [x] daily progress note       [] discharge       Patient Name:  Monique Nickerson   :  1940 MRN: [id number]  Room:  [address] [place] [address] Date of Admission: 10/28/2023  Rehabilitation Diagnosis:   Sepsis ([address]) [A41.9]       Date 11/3/2023       Day of ARU Week:  7   Time IN/OUT 4808-4124   Individual Tx Minutes 60   TOTAL Tx Time Mins 60   Variance Time    Variance Time []   Refusal due to:     []   Medical hold/reason:    []   Illness   []   Off Unit for test/procedure  []   Extra time needed to complete task  []   Therapeutic need  []   Other (specify):   Restrictions Restrictions/Precautions  Restrictions/Precautions: General Precautions, Fall Risk, Contact Precautions (contact precautions, IV RUE, suprapubic catheter)      Communication with other providers: [x]   OK to see per nursing:     []   Spoke with team member regarding:      Subjective observations and cognitive status: Pt up in [facility], willing to participate     Pain level/location: 0/10       Location:    Discharge recommendations  Anticipated discharge date:  TBD  Destination: []home alone   []home alone with assist PRN     [x] home w/ family      [] Continuous supervision  []SNF    [] Assisted living     [] Other:   Continued therapy: [x]HHC PT  []OUTPATIENT  PT   [] No Further PT  Equipment needs: none      Bed Mobility:           [x]   Pt received out of bed       Transfers:    Sit--> Stand:  SBA  Stand --> Sit:   SBA  Stand-Pivot:   SBA  Assistive device required for transfer:   RW    Gait:    Distance:  56'+77'+75'   Assistance:  SBA  Device:  RW  Gait Quality:   slow recip pattern, extra time to complete distances.      Curb       Assistance:   CG-Min A x 2 trials  Supportive Device:  RW  Height:   4\"    Uneven Surfaces:       Assistance:    Pt maintained SBA throughout, slow pace, demos safety  Device:    RW  Surfaces Completed:   [x] Carpet with bean bags beneath       [x] Throw rugs          [x] Outdoor pavements      [] Grass

## 2023-11-03 NOTE — PROGRESS NOTES
Occupational Therapy    Physical Rehabilitation: OCCUPATIONAL THERAPY     [x] daily progress note       [] discharge       Patient Name:  Abdoul Zendejas   :  1940 MRN: [id number]  Room:  [address] [place] [address] Date of Admission: 10/28/2023  Rehabilitation Diagnosis:   Sepsis ([address]) [A41.9]       Date 11/3/2023       Day of ARU Week:  7   Time IN/OUT 2308-8500  2390-4313   Individual Tx Minutes 80+40   Group Tx Minutes    Co-Treat Minutes    Concurrent Tx Minutes    TOTAL Tx Time Mins 120   Variance Time    Variance Time []   Refusal due to:     []   Medical hold/reason:    []   Illness   []   Off Unit for test/procedure  []   Extra time needed to complete task  []   Therapeutic need  []   Other (specify):   Restrictions Restrictions/Precautions: General Precautions, Fall Risk, Contact Precautions (contact precautions, IV RUE, suprapubic catheter)         Communication with other providers: [x]   OK to see per nursing:     []   Spoke with team member regarding:      Subjective observations and cognitive status: Pt resting in bed on approach; pleasant and agreeable to therapy. Pt was incontinent of bowel with no awareness.       Pain level/location:    /10       Location: denied    Discharge recommendations  Anticipated discharge date:    Destination: []home alone   []home alone w assist prn   [x] home w/ family    [] Continuous supervision       []SNF    [] Assisted living     [] Other:   Continued therapy: [x]HHC OT  []OUTPATIENT  OT   [] No Further OT  Equipment needs: None       ADLs:    Eating: Eating  Assistance Needed: Independent  Comment: X  CARE Score: 6  Discharge Goal: Independent       Oral Hygiene: Oral Hygiene  Assistance Needed: Independent  Comment: Seated at sink  CARE Score: 6  Discharge Goal: Independent    UB/LB Bathing: Shower/Bathe Self  Assistance Needed: Supervision or touching assistance  Comment: completed sinkside ADL c SBA in stance c continuous unilateral support on countertop, using LHS

## 2023-11-03 NOTE — PROGRESS NOTES
Nephrology Progress Note  11/3/2023 10:46 AM  Subjective: Interval History: Radha Hartmann is a 78 y.o. male  appears to be doing okay overall no distress        Data:   Scheduled Meds:   miconazole   Topical BID    losartan-hydroCHLOROthiazide  1 tablet Oral BID    insulin lispro  0-4 Units SubCUTAneous TID WC    insulin lispro  0-4 Units SubCUTAneous Nightly    [Held by provider] heparin (porcine)  5,000 Units SubCUTAneous 3 times per day    sodium chloride flush  5-40 mL IntraVENous 2 times per day    amLODIPine  10 mg Oral Daily    aspirin  81 mg Oral Daily    atorvastatin  20 mg Oral Nightly    carvedilol  25 mg Oral BID WC    levothyroxine  75 mcg Oral Daily    meropenem  500 mg IntraVENous Q12H    pantoprazole  40 mg Oral QAM AC     Continuous Infusions:   dextrose           CBC No results for input(s): \"WBC\", \"HGB\", \"HCT\", \"PLT\" in the last 72 hours. BMP   Recent Labs     11/03/23  0720      K 4.1      CO2 23   PHOS 2.9   BUN 31*   CREATININE 1.8*     Hepatic:   No results for input(s): \"AST\", \"ALT\", \"ALB\", \"BILITOT\", \"ALKPHOS\" in the last 72 hours. Troponin: No results for input(s): \"TROPONINI\" in the last 72 hours. BNP: No results for input(s): \"BNP\" in the last 72 hours. Lipids: No results for input(s): \"CHOL\", \"HDL\" in the last 72 hours. Invalid input(s): \"LDLCALCU\"  ABGs:   Lab Results   Component Value Date/Time    PO2ART 37 10/24/2022 04:15 PM    SRF2FOR 49.0 10/24/2022 04:15 PM     INR: No results for input(s): \"INR\" in the last 72 hours.   Renal Labs  Albumin:    Lab Results   Component Value Date/Time    LABALBU 3.0 11/03/2023 07:20 AM     Calcium:    Lab Results   Component Value Date/Time    CALCIUM 8.4 11/03/2023 07:20 AM     Phosphorus:    Lab Results   Component Value Date/Time    PHOS 2.9 11/03/2023 07:20 AM     U/A:    Lab Results   Component Value Date/Time    NITRU NEGATIVE 11/02/2023 11:43 PM    NITRU NEGATIVE 03/23/2013 02:24 PM    COLORU YELLOW 11/02/2023 bilaterally  Heart: S1, S2 normal  Abdomen: abnormal findings:  soft nt  Extremities: edema trace  Neurologic: Mental status: alertness: alert        Assessment and Plan:      IMP:  #1 stage III chronic disease  #2 type 2 diabetes  #3 hypertension  #4 prior UTI with indwelling ureteral stent    Plan      #1 renal status stable check labs again this weekend   #2 monitor control glucose   No. 3 blood pressure overall stable   #4 follow-up UA and urine culture appears to be resolving UTI           Levar Cesar MD, MD

## 2023-11-04 LAB
CULTURE: ABNORMAL
CULTURE: ABNORMAL
GLUCOSE BLD-MCNC: 192 MG/DL (ref 70–99)
GLUCOSE BLD-MCNC: 199 MG/DL (ref 70–99)
GLUCOSE BLD-MCNC: 238 MG/DL (ref 70–99)
GLUCOSE BLD-MCNC: 268 MG/DL (ref 70–99)
Lab: ABNORMAL
SPECIMEN: ABNORMAL

## 2023-11-04 PROCEDURE — 6370000000 HC RX 637 (ALT 250 FOR IP): Performed by: PHYSICAL MEDICINE & REHABILITATION

## 2023-11-04 PROCEDURE — 94150 VITAL CAPACITY TEST: CPT

## 2023-11-04 PROCEDURE — 82962 GLUCOSE BLOOD TEST: CPT

## 2023-11-04 PROCEDURE — 94761 N-INVAS EAR/PLS OXIMETRY MLT: CPT

## 2023-11-04 PROCEDURE — 1280000000 HC REHAB R&B

## 2023-11-04 PROCEDURE — 6370000000 HC RX 637 (ALT 250 FOR IP): Performed by: INTERNAL MEDICINE

## 2023-11-04 PROCEDURE — 2580000003 HC RX 258: Performed by: INTERNAL MEDICINE

## 2023-11-04 RX ADMIN — LOSARTAN POTASSIUM AND HYDROCHLOROTHIAZIDE 1 TABLET: 50; 12.5 TABLET, FILM COATED ORAL at 22:05

## 2023-11-04 RX ADMIN — CARVEDILOL 25 MG: 25 TABLET, FILM COATED ORAL at 17:17

## 2023-11-04 RX ADMIN — AMLODIPINE BESYLATE 10 MG: 10 TABLET ORAL at 09:35

## 2023-11-04 RX ADMIN — MICONAZOLE NITRATE: 2 POWDER TOPICAL at 22:05

## 2023-11-04 RX ADMIN — INSULIN LISPRO 2 UNITS: 100 INJECTION, SOLUTION INTRAVENOUS; SUBCUTANEOUS at 12:48

## 2023-11-04 RX ADMIN — CARVEDILOL 25 MG: 25 TABLET, FILM COATED ORAL at 09:35

## 2023-11-04 RX ADMIN — PANTOPRAZOLE SODIUM 40 MG: 40 TABLET, DELAYED RELEASE ORAL at 05:34

## 2023-11-04 RX ADMIN — ATORVASTATIN CALCIUM 20 MG: 10 TABLET, FILM COATED ORAL at 22:05

## 2023-11-04 RX ADMIN — MICONAZOLE NITRATE: 2 POWDER TOPICAL at 09:36

## 2023-11-04 RX ADMIN — ASPIRIN 81 MG CHEWABLE TABLET 81 MG: 81 TABLET CHEWABLE at 09:35

## 2023-11-04 RX ADMIN — BISACODYL 10 MG: 10 SUPPOSITORY RECTAL at 22:05

## 2023-11-04 RX ADMIN — INSULIN LISPRO 1 UNITS: 100 INJECTION, SOLUTION INTRAVENOUS; SUBCUTANEOUS at 18:11

## 2023-11-04 RX ADMIN — LEVOTHYROXINE SODIUM 75 MCG: 0.07 TABLET ORAL at 05:34

## 2023-11-04 RX ADMIN — SODIUM CHLORIDE, PRESERVATIVE FREE 10 ML: 5 INJECTION INTRAVENOUS at 09:36

## 2023-11-04 RX ADMIN — LOSARTAN POTASSIUM AND HYDROCHLOROTHIAZIDE 1 TABLET: 50; 12.5 TABLET, FILM COATED ORAL at 09:35

## 2023-11-04 ASSESSMENT — PAIN SCALES - WONG BAKER
WONGBAKER_NUMERICALRESPONSE: 0
WONGBAKER_NUMERICALRESPONSE: 0

## 2023-11-04 ASSESSMENT — PAIN SCALES - GENERAL
PAINLEVEL_OUTOF10: 0

## 2023-11-04 NOTE — PROGRESS NOTES
Nephrology Progress Note  11/4/2023 10:55 AM  Subjective: Interval History: Ana Luisa Bauman is a 78 y.o. male resting in bed doing well        Data:   Scheduled Meds:   miconazole   Topical BID    losartan-hydroCHLOROthiazide  1 tablet Oral BID    insulin lispro  0-4 Units SubCUTAneous TID     insulin lispro  0-4 Units SubCUTAneous Nightly    [Held by provider] heparin (porcine)  5,000 Units SubCUTAneous 3 times per day    sodium chloride flush  5-40 mL IntraVENous 2 times per day    amLODIPine  10 mg Oral Daily    aspirin  81 mg Oral Daily    atorvastatin  20 mg Oral Nightly    carvedilol  25 mg Oral BID WC    levothyroxine  75 mcg Oral Daily    pantoprazole  40 mg Oral QAM AC     Continuous Infusions:   dextrose           CBC No results for input(s): \"WBC\", \"HGB\", \"HCT\", \"PLT\" in the last 72 hours. BMP   Recent Labs     11/03/23  0720      K 4.1      CO2 23   PHOS 2.9   BUN 31*   CREATININE 1.8*     Hepatic:   No results for input(s): \"AST\", \"ALT\", \"ALB\", \"BILITOT\", \"ALKPHOS\" in the last 72 hours. Troponin: No results for input(s): \"TROPONINI\" in the last 72 hours. BNP: No results for input(s): \"BNP\" in the last 72 hours. Lipids: No results for input(s): \"CHOL\", \"HDL\" in the last 72 hours. Invalid input(s): \"LDLCALCU\"  ABGs:   Lab Results   Component Value Date/Time    PO2ART 37 10/24/2022 04:15 PM    HEK8DPD 49.0 10/24/2022 04:15 PM     INR: No results for input(s): \"INR\" in the last 72 hours.   Renal Labs  Albumin:    Lab Results   Component Value Date/Time    LABALBU 3.0 11/03/2023 07:20 AM     Calcium:    Lab Results   Component Value Date/Time    CALCIUM 8.4 11/03/2023 07:20 AM     Phosphorus:    Lab Results   Component Value Date/Time    PHOS 2.9 11/03/2023 07:20 AM     U/A:    Lab Results   Component Value Date/Time    NITRU NEGATIVE 11/02/2023 11:43 PM    NITRU NEGATIVE 03/23/2013 02:24 PM    COLORU YELLOW 11/02/2023 11:43 PM    WBCUA 174 11/02/2023 11:43 PM    RBCUA 3

## 2023-11-04 NOTE — PROGRESS NOTES
was ***. Standing balance was ***.     Lab Results   Component Value Date    WBC 8.1 10/25/2023    HGB 8.5 (L) 10/25/2023    HCT 30.7 (L) 10/25/2023    .6 (H) 10/25/2023     10/25/2023     Lab Results   Component Value Date    INR 1.02 05/22/2023    INR 1.08 10/24/2022    INR 1.10 09/12/2022    PROTIME 12.9 05/22/2023    PROTIME 14.0 10/24/2022    PROTIME 14.2 09/12/2022     Lab Results   Component Value Date    CREATININE 1.8 (H) 11/03/2023    BUN 31 (H) 11/03/2023     11/03/2023    K 4.1 11/03/2023     11/03/2023    CO2 23 11/03/2023     Lab Results   Component Value Date    ALT 14 10/30/2023    AST 25 10/30/2023    ALKPHOS 83 10/30/2023    BILITOT 0.2 10/30/2023       Expected length of stay  prior to a supervised level of function for discharge home with a walker and HHC OT/PT is ***    Recommendations:    *** exercises, balance retraining and conditioning development. He appears euvolemic now. Urology wants to reassess him as an outpatient after rehab. We are no longer seeing any blood in the collection bag. Dissipating caregiver training with his spouse. Outpatient follow-up with his primary care physician. Verbal cues and minimum physical assistance for transfers today. DVT prophylaxis: Continuing the heparin 5000 units every 8 hours. This raises his risk for spontaneous hemorrhage and GI bleeding. Monitoring his hemoglobin and platelet count regularly. Providing GI prophylaxis with a statin. N no bruising or swelling. Again, no hematuria was seen today. Acute pyelonephritis: Continuing meropenem through 11/3/2023. Encouraging consistent oral hydration. Regular topical hygiene about the catheter site. Urology has executed a catheter exchange and ureteral stent placement. No ill effects. Uncontrolled diabetes type 2 with hyperglycemia: Continuing a diet modified for carbohydrates. Continuing Lantus 10 units nightly as well as providing a Humalog sliding scale. Checking blood sugars at mealtime and bedtime. Chronic combined congestive heart failure: Daily weights do not reveal any decompensation. Continuing afterload reduction with Coreg but holding off on diuresis due to his limited oral intake and lower blood pressures. Continuing Norvasc with parameters for blood pressure regulation. Providing antiplatelet therapy with a baby aspirin as well as a statin. Hyperkalemia: Encouraging consistent oral hydration. Holding off with diuretic therapy at the moment but this may be reasonable strategy once his blood pressure becomes more consistent. Periodic monitoring of his chemistries. Essential hypertension: Continue Norvasc and Coreg for blood pressure regulation with parameters. Target systolic blood pressures 765-245. Vital signs are checked at rest and with activity.   Today, his blood

## 2023-11-04 NOTE — PLAN OF CARE
Problem: Discharge Planning  Goal: Discharge to home or other facility with appropriate resources  Outcome: Progressing  Flowsheets (Taken 11/3/2023 2112)  Discharge to home or other facility with appropriate resources: Identify barriers to discharge with patient and caregiver     Problem: Safety - Adult  Goal: Free from fall injury  Outcome: Progressing     Problem: ABCDS Injury Assessment  Goal: Absence of physical injury  Outcome: Progressing     Problem: Pain  Goal: Verbalizes/displays adequate comfort level or baseline comfort level  Outcome: Progressing     Problem: Skin/Tissue Integrity  Goal: Absence of new skin breakdown  Description: 1. Monitor for areas of redness and/or skin breakdown  2. Assess vascular access sites hourly  3. Every 4-6 hours minimum:  Change oxygen saturation probe site  4. Every 4-6 hours:  If on nasal continuous positive airway pressure, respiratory therapy assess nares and determine need for appliance change or resting period.   Outcome: Progressing     Problem: Chronic Conditions and Co-morbidities  Goal: Patient's chronic conditions and co-morbidity symptoms are monitored and maintained or improved  Outcome: Progressing  Flowsheets (Taken 11/3/2023 2112)  Care Plan - Patient's Chronic Conditions and Co-Morbidity Symptoms are Monitored and Maintained or Improved: Monitor and assess patient's chronic conditions and comorbid symptoms for stability, deterioration, or improvement

## 2023-11-05 VITALS
HEART RATE: 68 BPM | WEIGHT: 204.59 LBS | BODY MASS INDEX: 31.01 KG/M2 | DIASTOLIC BLOOD PRESSURE: 59 MMHG | HEIGHT: 68 IN | SYSTOLIC BLOOD PRESSURE: 127 MMHG | OXYGEN SATURATION: 97 % | RESPIRATION RATE: 16 BRPM | TEMPERATURE: 97.3 F

## 2023-11-05 LAB
GLUCOSE BLD-MCNC: 210 MG/DL (ref 70–99)
GLUCOSE BLD-MCNC: 218 MG/DL (ref 70–99)
GLUCOSE BLD-MCNC: 221 MG/DL (ref 70–99)
GLUCOSE BLD-MCNC: 223 MG/DL (ref 70–99)

## 2023-11-05 PROCEDURE — 6370000000 HC RX 637 (ALT 250 FOR IP): Performed by: INTERNAL MEDICINE

## 2023-11-05 PROCEDURE — 6370000000 HC RX 637 (ALT 250 FOR IP): Performed by: PHYSICAL MEDICINE & REHABILITATION

## 2023-11-05 PROCEDURE — 94761 N-INVAS EAR/PLS OXIMETRY MLT: CPT

## 2023-11-05 PROCEDURE — 2580000003 HC RX 258: Performed by: INTERNAL MEDICINE

## 2023-11-05 PROCEDURE — 1280000000 HC REHAB R&B

## 2023-11-05 PROCEDURE — 82962 GLUCOSE BLOOD TEST: CPT

## 2023-11-05 RX ADMIN — AMLODIPINE BESYLATE 10 MG: 10 TABLET ORAL at 09:13

## 2023-11-05 RX ADMIN — CARVEDILOL 25 MG: 25 TABLET, FILM COATED ORAL at 09:13

## 2023-11-05 RX ADMIN — LOSARTAN POTASSIUM AND HYDROCHLOROTHIAZIDE 1 TABLET: 50; 12.5 TABLET, FILM COATED ORAL at 09:13

## 2023-11-05 RX ADMIN — MICONAZOLE NITRATE: 2 POWDER TOPICAL at 21:49

## 2023-11-05 RX ADMIN — BISACODYL 10 MG: 10 SUPPOSITORY RECTAL at 21:48

## 2023-11-05 RX ADMIN — INSULIN LISPRO 1 UNITS: 100 INJECTION, SOLUTION INTRAVENOUS; SUBCUTANEOUS at 17:06

## 2023-11-05 RX ADMIN — LOSARTAN POTASSIUM AND HYDROCHLOROTHIAZIDE 1 TABLET: 50; 12.5 TABLET, FILM COATED ORAL at 21:53

## 2023-11-05 RX ADMIN — INSULIN LISPRO 1 UNITS: 100 INJECTION, SOLUTION INTRAVENOUS; SUBCUTANEOUS at 09:12

## 2023-11-05 RX ADMIN — INSULIN LISPRO 1 UNITS: 100 INJECTION, SOLUTION INTRAVENOUS; SUBCUTANEOUS at 13:10

## 2023-11-05 RX ADMIN — ATORVASTATIN CALCIUM 20 MG: 10 TABLET, FILM COATED ORAL at 21:48

## 2023-11-05 RX ADMIN — PANTOPRAZOLE SODIUM 40 MG: 40 TABLET, DELAYED RELEASE ORAL at 05:34

## 2023-11-05 RX ADMIN — CARVEDILOL 25 MG: 25 TABLET, FILM COATED ORAL at 17:05

## 2023-11-05 RX ADMIN — SODIUM CHLORIDE, PRESERVATIVE FREE 10 ML: 5 INJECTION INTRAVENOUS at 21:00

## 2023-11-05 RX ADMIN — SODIUM CHLORIDE, PRESERVATIVE FREE 10 ML: 5 INJECTION INTRAVENOUS at 09:13

## 2023-11-05 RX ADMIN — LEVOTHYROXINE SODIUM 75 MCG: 0.07 TABLET ORAL at 05:34

## 2023-11-05 RX ADMIN — ASPIRIN 81 MG CHEWABLE TABLET 81 MG: 81 TABLET CHEWABLE at 09:13

## 2023-11-05 NOTE — PROGRESS NOTES
Nephrology Progress Note  11/5/2023 7:16 AM  Subjective: Interval History: Louis Godoy is a 78 y.o. male who state doing better with BM yesterday      Data:   Scheduled Meds:   miconazole   Topical BID    losartan-hydroCHLOROthiazide  1 tablet Oral BID    insulin lispro  0-4 Units SubCUTAneous TID WC    insulin lispro  0-4 Units SubCUTAneous Nightly    [Held by provider] heparin (porcine)  5,000 Units SubCUTAneous 3 times per day    sodium chloride flush  5-40 mL IntraVENous 2 times per day    amLODIPine  10 mg Oral Daily    aspirin  81 mg Oral Daily    atorvastatin  20 mg Oral Nightly    carvedilol  25 mg Oral BID WC    levothyroxine  75 mcg Oral Daily    pantoprazole  40 mg Oral QAM AC     Continuous Infusions:   dextrose           CBC No results for input(s): \"WBC\", \"HGB\", \"HCT\", \"PLT\" in the last 72 hours. BMP   Recent Labs     11/03/23  0720      K 4.1      CO2 23   PHOS 2.9   BUN 31*   CREATININE 1.8*     Hepatic:   No results for input(s): \"AST\", \"ALT\", \"ALB\", \"BILITOT\", \"ALKPHOS\" in the last 72 hours. Troponin: No results for input(s): \"TROPONINI\" in the last 72 hours. BNP: No results for input(s): \"BNP\" in the last 72 hours. Lipids: No results for input(s): \"CHOL\", \"HDL\" in the last 72 hours. Invalid input(s): \"LDLCALCU\"  ABGs:   Lab Results   Component Value Date/Time    PO2ART 37 10/24/2022 04:15 PM    NVC4EPF 49.0 10/24/2022 04:15 PM     INR: No results for input(s): \"INR\" in the last 72 hours.   Renal Labs  Albumin:    Lab Results   Component Value Date/Time    LABALBU 3.0 11/03/2023 07:20 AM     Calcium:    Lab Results   Component Value Date/Time    CALCIUM 8.4 11/03/2023 07:20 AM     Phosphorus:    Lab Results   Component Value Date/Time    PHOS 2.9 11/03/2023 07:20 AM     U/A:    Lab Results   Component Value Date/Time    NITRU NEGATIVE 11/02/2023 11:43 PM    [facility] NE[facility]IVE 03/23/2013 02:24 PM    COLORU YELLOW 11/02/2023 11:43 PM    WBCUA 174 11/02/2023 11:43 PM    RBCUA

## 2023-11-06 LAB
ALBUMIN SERPL-MCNC: 3 GM/DL (ref 3.4–5)
ALP BLD-CCNC: 100 IU/L (ref 40–128)
ALT SERPL-CCNC: 10 U/L (ref 10–40)
ANION GAP SERPL CALCULATED.3IONS-SCNC: 7 MMOL/L (ref 4–16)
AST SERPL-CCNC: 13 IU/L (ref 15–37)
BILIRUB SERPL-MCNC: 0.2 MG/DL (ref 0–1)
BUN SERPL-MCNC: 30 MG/DL (ref 6–23)
CALCIUM SERPL-MCNC: 8.6 MG/DL (ref 8.3–10.6)
CHLORIDE BLD-SCNC: 108 MMOL/L (ref 99–110)
CO2: 24 MMOL/L (ref 21–32)
CREAT SERPL-MCNC: 1.9 MG/DL (ref 0.9–1.3)
GFR SERPL CREATININE-BSD FRML MDRD: 34 ML/MIN/1.73M2
GLUCOSE BLD-MCNC: 141 MG/DL (ref 70–99)
GLUCOSE BLD-MCNC: 179 MG/DL (ref 70–99)
GLUCOSE BLD-MCNC: 212 MG/DL (ref 70–99)
GLUCOSE BLD-MCNC: 348 MG/DL (ref 70–99)
GLUCOSE SERPL-MCNC: 210 MG/DL (ref 70–99)
POTASSIUM SERPL-SCNC: 5 MMOL/L (ref 3.5–5.1)
SODIUM BLD-SCNC: 139 MMOL/L (ref 135–145)
TOTAL PROTEIN: 5.3 GM/DL (ref 6.4–8.2)

## 2023-11-06 PROCEDURE — 1280000000 HC REHAB R&B

## 2023-11-06 PROCEDURE — 2580000003 HC RX 258: Performed by: INTERNAL MEDICINE

## 2023-11-06 PROCEDURE — 99232 SBSQ HOSP IP/OBS MODERATE 35: CPT | Performed by: PHYSICAL MEDICINE & REHABILITATION

## 2023-11-06 PROCEDURE — 36415 COLL VENOUS BLD VENIPUNCTURE: CPT

## 2023-11-06 PROCEDURE — 6370000000 HC RX 637 (ALT 250 FOR IP): Performed by: INTERNAL MEDICINE

## 2023-11-06 PROCEDURE — 94761 N-INVAS EAR/PLS OXIMETRY MLT: CPT

## 2023-11-06 PROCEDURE — 97530 THERAPEUTIC ACTIVITIES: CPT

## 2023-11-06 PROCEDURE — 6360000002 HC RX W HCPCS: Performed by: INTERNAL MEDICINE

## 2023-11-06 PROCEDURE — 97110 THERAPEUTIC EXERCISES: CPT

## 2023-11-06 PROCEDURE — 97116 GAIT TRAINING THERAPY: CPT

## 2023-11-06 PROCEDURE — 80053 COMPREHEN METABOLIC PANEL: CPT

## 2023-11-06 PROCEDURE — 97535 SELF CARE MNGMENT TRAINING: CPT

## 2023-11-06 PROCEDURE — 82962 GLUCOSE BLOOD TEST: CPT

## 2023-11-06 RX ADMIN — SODIUM CHLORIDE, PRESERVATIVE FREE 10 ML: 5 INJECTION INTRAVENOUS at 09:40

## 2023-11-06 RX ADMIN — INSULIN LISPRO 1 UNITS: 100 INJECTION, SOLUTION INTRAVENOUS; SUBCUTANEOUS at 09:36

## 2023-11-06 RX ADMIN — HEPARIN SODIUM 5000 UNITS: 5000 INJECTION INTRAVENOUS; SUBCUTANEOUS at 16:37

## 2023-11-06 RX ADMIN — LOSARTAN POTASSIUM AND HYDROCHLOROTHIAZIDE 1 TABLET: 50; 12.5 TABLET, FILM COATED ORAL at 09:39

## 2023-11-06 RX ADMIN — HEPARIN SODIUM 5000 UNITS: 5000 INJECTION INTRAVENOUS; SUBCUTANEOUS at 22:10

## 2023-11-06 RX ADMIN — INSULIN LISPRO 3 UNITS: 100 INJECTION, SOLUTION INTRAVENOUS; SUBCUTANEOUS at 12:03

## 2023-11-06 RX ADMIN — PANTOPRAZOLE SODIUM 40 MG: 40 TABLET, DELAYED RELEASE ORAL at 05:47

## 2023-11-06 RX ADMIN — SODIUM CHLORIDE, PRESERVATIVE FREE 10 ML: 5 INJECTION INTRAVENOUS at 22:10

## 2023-11-06 RX ADMIN — CARVEDILOL 25 MG: 25 TABLET, FILM COATED ORAL at 16:37

## 2023-11-06 RX ADMIN — ATORVASTATIN CALCIUM 20 MG: 10 TABLET, FILM COATED ORAL at 22:09

## 2023-11-06 RX ADMIN — MICONAZOLE NITRATE: 2 POWDER TOPICAL at 22:28

## 2023-11-06 RX ADMIN — ASPIRIN 81 MG CHEWABLE TABLET 81 MG: 81 TABLET CHEWABLE at 09:35

## 2023-11-06 RX ADMIN — CARVEDILOL 25 MG: 25 TABLET, FILM COATED ORAL at 09:35

## 2023-11-06 RX ADMIN — LEVOTHYROXINE SODIUM 75 MCG: 0.07 TABLET ORAL at 05:47

## 2023-11-06 RX ADMIN — ACETAMINOPHEN 650 MG: 325 TABLET ORAL at 05:47

## 2023-11-06 RX ADMIN — MICONAZOLE NITRATE: 2 POWDER TOPICAL at 09:40

## 2023-11-06 RX ADMIN — LOSARTAN POTASSIUM AND HYDROCHLOROTHIAZIDE 1 TABLET: 50; 12.5 TABLET, FILM COATED ORAL at 22:10

## 2023-11-06 RX ADMIN — AMLODIPINE BESYLATE 10 MG: 10 TABLET ORAL at 09:35

## 2023-11-06 ASSESSMENT — PAIN SCALES - GENERAL: PAINLEVEL_OUTOF10: 0

## 2023-11-06 NOTE — PROGRESS NOTES
week  Short Term Goal 1: Pt will complete supine to sitting EOB with CGA  Short Term Goal 2: Pt will ambulate 120' with RW and CGA  Short Term Goal 3: Pt will propel self in [facility] for 150' with supervision A.:  Long Term Goals  Time Frame for Long Term Goals : 3 weeks  Long Term Goal 1: Pt will ambulate 150'+' with LRAD and CGA-Sup A  Long Term Goal 2: Pt will be independent with WC mobility for up to 150'  Long Term Goal 3: Pt will perform sit-to-stand transfers to LRAD with supervision A.:        Plan of Care                                                                              Times per week: 5 days per week for a minimum of 60 minutes/day plus group as appropriate for 60 minutes.   Treatment to include Current Treatment Recommendations: Strengthening, Balance training, Functional mobility training, Transfer training, Endurance training, ADL/Self-care training, Wheelchair mobility training, Gait training, Stair training, Pain management, Patient/Caregiver education & training, Safety education & training, Home exercise program, Positioning, Therapeutic activities, Co-Treatment, Group Therapy    Electronically signed by   Brenda Boo, PTA #[id number]  11/6/2023, 8:51 AM

## 2023-11-06 NOTE — PROGRESS NOTES
Perhaps fewer bladder spasms recently. Anticipating caregiver training with his family. Outpatient follow-up with his primary care physician. Verbal cues and minimum to moderate physical assistance for transfers. DVT prophylaxis: Continuing with heparin 5000 units every 8 hours. This raises his risk for spontaneous hemorrhage and GI bleeding. Monitoring his hemoglobin and platelet count regularly. Continue providing GI prophylaxis with a statin. No new bruising or swelling. Resuming heparin following this procedure. Acute pyelonephritis: He has completed his IV meropenem treatment of his pyelonephritis. Good GI tolerance reported. Practicing good techniques for topical hygiene about the indwelling catheter site. Urology will follow-up as an outpatient. Uncontrolled diabetes type 2 with hyperglycemia: Continuing a diet modified for carbohydrates. Continuing Lantus 10 units nightly as well as providing a Humalog sliding scale. Checking blood sugars at mealtime and bedtime. Chronic combined congestive heart failure: Daily weights do not reveal any decompensation. Continuing afterload reduction with Coreg but holding off on diuresis due to his limited oral intake and lower blood pressures. Continuing Norvasc with parameters for blood pressure regulation. Continuing the antiplatelet therapy with a baby aspirin as well as the statin. Hyperkalemia: Encouraging consistent oral hydration. Holding off with diuretic therapy at the moment but this may be reasonable strategy once his blood pressure becomes more consistent. Periodic monitoring of his chemistries. Essential hypertension: Continue Norvasc and Coreg for blood pressure regulation with parameters. Target systolic blood pressures 718-036. Vital signs are checked at rest and with activity. Today's blood pressure was just above the target range on medication. Monitoring closely.

## 2023-11-06 NOTE — PROGRESS NOTES
Occupational Therapy    Physical Rehabilitation: OCCUPATIONAL THERAPY     [x] daily progress note       [] discharge       Patient Name:  Edd Pena   :  1940 MRN: [id number]  Room:  [address] [place] [address] Date of Admission: 10/28/2023  Rehabilitation Diagnosis:   Sepsis ([address]) [A41.9]       Date 2023       Day of ARU Week:  3   Time IN/OUT 8180-8222  5455-7379   Individual Tx Minutes 75+45   Group Tx Minutes    Co-Treat Minutes    Concurrent Tx Minutes    TOTAL Tx Time Mins 120   Variance Time    Variance Time []   Refusal due to:     []   Medical hold/reason:    []   Illness   []   Off Unit for test/procedure  []   Extra time needed to complete task  []   Therapeutic need  []   Other (specify):   Restrictions Restrictions/Precautions: General Precautions, Fall Risk, Contact Precautions (contact precautions, IV RUE, suprapubic catheter)         Communication with other providers: [x]   OK to see per nursing:     []   Spoke with team member regarding:      Subjective observations and cognitive status: Pt resting in bed on approach; pleasant and agreeable to therapy     Pt sitting up in [place] on approach; pleasant and agreeable to therapy.       Pain level/location:    /10       Location:    Discharge recommendations  Anticipated discharge date:    Destination: []home alone   []home alone w assist prn   [x] home w/ family    [] Continuous supervision       []SNF    [] Assisted living     [] Other:   Continued therapy: [x]HHC OT  []OUTPATIENT  OT   [] No Further OT  Equipment needs: None       ADLs:    Eating: Eating  Assistance Needed: Independent  Comment: Pt able to open all containers/packages and feed self  CARE Score: 6  Discharge Goal: Independent       Oral Hygiene: Oral Hygiene  Assistance Needed: Independent  Comment: seated at sink  CARE Score: 6  Discharge Goal: Independent    UB/LB Bathing: Shower/Bathe Self  Assistance Needed: Independent  Comment: Pt completed full shower seated with weight

## 2023-11-07 LAB
GLUCOSE BLD-MCNC: 167 MG/DL (ref 70–99)
GLUCOSE BLD-MCNC: 176 MG/DL (ref 70–99)
GLUCOSE BLD-MCNC: 234 MG/DL (ref 70–99)
GLUCOSE BLD-MCNC: 359 MG/DL (ref 70–99)

## 2023-11-07 PROCEDURE — 97535 SELF CARE MNGMENT TRAINING: CPT

## 2023-11-07 PROCEDURE — 97110 THERAPEUTIC EXERCISES: CPT

## 2023-11-07 PROCEDURE — 6370000000 HC RX 637 (ALT 250 FOR IP): Performed by: INTERNAL MEDICINE

## 2023-11-07 PROCEDURE — 94761 N-INVAS EAR/PLS OXIMETRY MLT: CPT

## 2023-11-07 PROCEDURE — 97530 THERAPEUTIC ACTIVITIES: CPT

## 2023-11-07 PROCEDURE — 1280000000 HC REHAB R&B

## 2023-11-07 PROCEDURE — 97116 GAIT TRAINING THERAPY: CPT

## 2023-11-07 PROCEDURE — 6360000002 HC RX W HCPCS: Performed by: INTERNAL MEDICINE

## 2023-11-07 PROCEDURE — 82962 GLUCOSE BLOOD TEST: CPT

## 2023-11-07 PROCEDURE — 99232 SBSQ HOSP IP/OBS MODERATE 35: CPT | Performed by: PHYSICAL MEDICINE & REHABILITATION

## 2023-11-07 PROCEDURE — 2580000003 HC RX 258: Performed by: INTERNAL MEDICINE

## 2023-11-07 RX ADMIN — MICONAZOLE NITRATE: 2 POWDER TOPICAL at 21:38

## 2023-11-07 RX ADMIN — MICONAZOLE NITRATE: 2 POWDER TOPICAL at 09:07

## 2023-11-07 RX ADMIN — ATORVASTATIN CALCIUM 20 MG: 10 TABLET, FILM COATED ORAL at 21:37

## 2023-11-07 RX ADMIN — HEPARIN SODIUM 5000 UNITS: 5000 INJECTION INTRAVENOUS; SUBCUTANEOUS at 14:11

## 2023-11-07 RX ADMIN — INSULIN LISPRO 4 UNITS: 100 INJECTION, SOLUTION INTRAVENOUS; SUBCUTANEOUS at 12:24

## 2023-11-07 RX ADMIN — INSULIN LISPRO 1 UNITS: 100 INJECTION, SOLUTION INTRAVENOUS; SUBCUTANEOUS at 17:13

## 2023-11-07 RX ADMIN — SODIUM CHLORIDE, PRESERVATIVE FREE 10 ML: 5 INJECTION INTRAVENOUS at 09:09

## 2023-11-07 RX ADMIN — AMLODIPINE BESYLATE 10 MG: 10 TABLET ORAL at 09:06

## 2023-11-07 RX ADMIN — HEPARIN SODIUM 5000 UNITS: 5000 INJECTION INTRAVENOUS; SUBCUTANEOUS at 21:37

## 2023-11-07 RX ADMIN — HEPARIN SODIUM 5000 UNITS: 5000 INJECTION INTRAVENOUS; SUBCUTANEOUS at 05:43

## 2023-11-07 RX ADMIN — CARVEDILOL 25 MG: 25 TABLET, FILM COATED ORAL at 09:06

## 2023-11-07 RX ADMIN — LEVOTHYROXINE SODIUM 75 MCG: 0.07 TABLET ORAL at 05:43

## 2023-11-07 RX ADMIN — SODIUM CHLORIDE, PRESERVATIVE FREE 10 ML: 5 INJECTION INTRAVENOUS at 21:43

## 2023-11-07 RX ADMIN — ASPIRIN 81 MG CHEWABLE TABLET 81 MG: 81 TABLET CHEWABLE at 09:06

## 2023-11-07 RX ADMIN — LOSARTAN POTASSIUM AND HYDROCHLOROTHIAZIDE 1 TABLET: 50; 12.5 TABLET, FILM COATED ORAL at 21:37

## 2023-11-07 RX ADMIN — CARVEDILOL 25 MG: 25 TABLET, FILM COATED ORAL at 17:12

## 2023-11-07 RX ADMIN — LOSARTAN POTASSIUM AND HYDROCHLOROTHIAZIDE 1 TABLET: 50; 12.5 TABLET, FILM COATED ORAL at 09:07

## 2023-11-07 RX ADMIN — PANTOPRAZOLE SODIUM 40 MG: 40 TABLET, DELAYED RELEASE ORAL at 05:43
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
## 2023-11-07 NOTE — PROGRESS NOTES
Physical Therapy      [x] daily progress note       [] discharge       Patient Name:  Frank Forman   :  1940 MRN: [id number]  Room:  [address] [place], [place] [address] Date of Admission: 10/28/2023  Rehabilitation Diagnosis:   Sepsis ([address]) [A41.9]       Date 2023       Day of ARU Week:  4   Time IN/OUT 2248-4574  9399-2433   Individual Tx Minutes 60+60   TOTAL Tx Time Mins 120   Variance Time    Variance Time []   Refusal due to:     []   Medical hold/reason:    []   Illness   []   Off Unit for test/procedure  []   Extra time needed to complete task  []   Therapeutic need  []   Other (specify):   Restrictions Restrictions/Precautions  Restrictions/Precautions: General Precautions, Fall Risk, Contact Precautions (contact precautions, IV RUE, suprapubic catheter)      Communication with other providers: [x]   OK to see per nursing:     []   Spoke with team member regarding:      Subjective observations and cognitive status: Pt up in [facility], willing to participate; Pt reports typically uses a leg bag at home. PM: Pt c/o pulling at cath site. Multiple attempts to re-position tubing and clothing but pt still feeling pressure. Discussed with RN; req returning pt to bed for bladder scan and checking cath site.     Pain level/location: Denies pain   Discharge recommendations  Anticipated discharge date:    Destination: []home alone   []home alone with assist PRN     [x] home w/ family      [] Continuous supervision  []SNF    [] Assisted living     [] Other:   Continued therapy: [x]HHC PT  []OUTPATIENT  PT   [] No Further PT  Equipment needs: none      Bed Mobility:           []   Pt received out of bed   Scooting:  Indep supine lateral scoot  Sit --> Supine:  Min A LEs into bed after max effort  Bed features used:    [] HOB elevated      [x] Bed rail as per home set up                                   [] No     Transfers:    Sit--> Stand:  Indep  Stand --> Sit:   Indep  Demos consistency with safety  Stand-Pivot:  Indep  Car

## 2023-11-07 NOTE — CARE COORDINATION
Discussed discharge with patient and provided a copy of the Important Message from Medicare form signed upon admission. Patient verbalized understanding. Faxed referral to [facility].

## 2023-11-08 LAB
GLUCOSE BLD-MCNC: 198 MG/DL (ref 70–99)
GLUCOSE BLD-MCNC: 218 MG/DL (ref 70–99)
GLUCOSE BLD-MCNC: 263 MG/DL (ref 70–99)
GLUCOSE BLD-MCNC: 316 MG/DL (ref 70–99)
GLUCOSE BLD-MCNC: 363 MG/DL (ref 70–99)

## 2023-11-08 PROCEDURE — 94150 VITAL CAPACITY TEST: CPT

## 2023-11-08 PROCEDURE — 94761 N-INVAS EAR/PLS OXIMETRY MLT: CPT

## 2023-11-08 PROCEDURE — 82962 GLUCOSE BLOOD TEST: CPT

## 2023-11-08 PROCEDURE — 97530 THERAPEUTIC ACTIVITIES: CPT

## 2023-11-08 PROCEDURE — 1280000000 HC REHAB R&B

## 2023-11-08 PROCEDURE — 6370000000 HC RX 637 (ALT 250 FOR IP): Performed by: INTERNAL MEDICINE

## 2023-11-08 PROCEDURE — 6360000002 HC RX W HCPCS: Performed by: INTERNAL MEDICINE

## 2023-11-08 PROCEDURE — 97535 SELF CARE MNGMENT TRAINING: CPT

## 2023-11-08 PROCEDURE — 97542 WHEELCHAIR MNGMENT TRAINING: CPT

## 2023-11-08 PROCEDURE — 99232 SBSQ HOSP IP/OBS MODERATE 35: CPT | Performed by: PHYSICAL MEDICINE & REHABILITATION

## 2023-11-08 PROCEDURE — 97116 GAIT TRAINING THERAPY: CPT

## 2023-11-08 PROCEDURE — 2580000003 HC RX 258: Performed by: INTERNAL MEDICINE

## 2023-11-08 PROCEDURE — 94664 DEMO&/EVAL PT USE INHALER: CPT

## 2023-11-08 RX ADMIN — INSULIN LISPRO 1 UNITS: 100 INJECTION, SOLUTION INTRAVENOUS; SUBCUTANEOUS at 09:31

## 2023-11-08 RX ADMIN — LOSARTAN POTASSIUM AND HYDROCHLOROTHIAZIDE 1 TABLET: 50; 12.5 TABLET, FILM COATED ORAL at 21:33

## 2023-11-08 RX ADMIN — HEPARIN SODIUM 5000 UNITS: 5000 INJECTION INTRAVENOUS; SUBCUTANEOUS at 05:55

## 2023-11-08 RX ADMIN — HEPARIN SODIUM 5000 UNITS: 5000 INJECTION INTRAVENOUS; SUBCUTANEOUS at 21:33

## 2023-11-08 RX ADMIN — LEVOTHYROXINE SODIUM 75 MCG: 0.07 TABLET ORAL at 05:55

## 2023-11-08 RX ADMIN — ACETAMINOPHEN 650 MG: 325 TABLET ORAL at 01:18

## 2023-11-08 RX ADMIN — AMLODIPINE BESYLATE 10 MG: 10 TABLET ORAL at 10:31

## 2023-11-08 RX ADMIN — MICONAZOLE NITRATE: 2 POWDER TOPICAL at 21:35

## 2023-11-08 RX ADMIN — CARVEDILOL 25 MG: 25 TABLET, FILM COATED ORAL at 10:31

## 2023-11-08 RX ADMIN — ASPIRIN 81 MG CHEWABLE TABLET 81 MG: 81 TABLET CHEWABLE at 10:32

## 2023-11-08 RX ADMIN — HEPARIN SODIUM 5000 UNITS: 5000 INJECTION INTRAVENOUS; SUBCUTANEOUS at 14:43

## 2023-11-08 RX ADMIN — ATORVASTATIN CALCIUM 20 MG: 10 TABLET, FILM COATED ORAL at 21:32

## 2023-11-08 RX ADMIN — PANTOPRAZOLE SODIUM 40 MG: 40 TABLET, DELAYED RELEASE ORAL at 05:55

## 2023-11-08 RX ADMIN — LOSARTAN POTASSIUM AND HYDROCHLOROTHIAZIDE 1 TABLET: 50; 12.5 TABLET, FILM COATED ORAL at 10:40

## 2023-11-08 RX ADMIN — SODIUM CHLORIDE, PRESERVATIVE FREE 10 ML: 5 INJECTION INTRAVENOUS at 10:34

## 2023-11-08 RX ADMIN — INSULIN LISPRO 2 UNITS: 100 INJECTION, SOLUTION INTRAVENOUS; SUBCUTANEOUS at 18:01

## 2023-11-08 RX ADMIN — INSULIN LISPRO 3 UNITS: 100 INJECTION, SOLUTION INTRAVENOUS; SUBCUTANEOUS at 12:19

## 2023-11-08 RX ADMIN — SODIUM CHLORIDE, PRESERVATIVE FREE 10 ML: 5 INJECTION INTRAVENOUS at 21:33

## 2023-11-08 ASSESSMENT — PAIN SCALES - GENERAL
PAINLEVEL_OUTOF10: 0
PAINLEVEL_OUTOF10: 0
PAINLEVEL_OUTOF10: 6

## 2023-11-08 ASSESSMENT — PAIN SCALES - WONG BAKER: WONGBAKER_NUMERICALRESPONSE: 0

## 2023-11-08 ASSESSMENT — PAIN DESCRIPTION - FREQUENCY: FREQUENCY: INTERMITTENT

## 2023-11-08 ASSESSMENT — PAIN DESCRIPTION - LOCATION: LOCATION: ABDOMEN

## 2023-11-08 ASSESSMENT — PAIN DESCRIPTION - DESCRIPTORS: DESCRIPTORS: CRAMPING;DISCOMFORT

## 2023-11-08 ASSESSMENT — PAIN DESCRIPTION - ONSET: ONSET: SUDDEN

## 2023-11-08 ASSESSMENT — PAIN DESCRIPTION - PAIN TYPE: TYPE: ACUTE PAIN

## 2023-11-08 ASSESSMENT — PAIN DESCRIPTION - ORIENTATION: ORIENTATION: MID;LOWER

## 2023-11-08 ASSESSMENT — PAIN - FUNCTIONAL ASSESSMENT: PAIN_FUNCTIONAL_ASSESSMENT: ACTIVITIES ARE NOT PREVENTED

## 2023-11-08 NOTE — PROGRESS NOTES
Times per week: 5 days per week for a minimum of 60 minutes/day plus group as appropriate for 60 minutes.   Treatment to include Current Treatment Recommendations: Strengthening, Balance training, Functional mobility training, Transfer training, Endurance training, ADL/Self-care training, Wheelchair mobility training, Gait training, Stair training, Pain management, Patient/Caregiver education & training, Safety education & training, Home exercise program, Positioning, Therapeutic activities, Co-Treatment, Group Therapy    Electronically signed by   Nathalie Curiel, PTA #[id number]  11/8/2023, 12:49 PM

## 2023-11-08 NOTE — PROGRESS NOTES
Occupational Therapy    Physical Rehabilitation: OCCUPATIONAL THERAPY     [] daily progress note       [x] discharge       Patient Name:  Alexandro Jimenez   :  1940 MRN: [id number]  Room:  [address] [place] [address] Date of Admission: 10/28/2023  Rehabilitation Diagnosis:   Sepsis ([address]) [A41.9]       Date 2023       Day of ARU Week:  5   Time IN/OUT 7989-4211   Individual Tx Minutes 90   Group Tx Minutes    Co-Treat Minutes    Concurrent Tx Minutes    TOTAL Tx Time Mins 90   Variance Time    Variance Time []   Refusal due to:     []   Medical hold/reason:    []   Illness   []   Off Unit for test/procedure  []   Extra time needed to complete task  []   Therapeutic need  []   Other (specify):   Restrictions Restrictions/Precautions: General Precautions, Fall Risk, Contact Precautions (contact precautions, IV RUE, suprapubic catheter)         Communication with other providers: [x]   OK to see per nursing:     []   Spoke with team member regarding:      Subjective observations and cognitive status: Pt resting in bed approach; pleasant and agreeable to therapy. Pt was incontinent of stool with little awareness.       Pain level/location:    /10       Location:    Discharge recommendations  Anticipated discharge date:    Destination: []home alone   []home alone w assist prn   [x] home w/ family    [] Continuous supervision       []SNF    [] Assisted living     [] Other:   Continued therapy: [x]HHC OT  []OUTPATIENT  OT   [] No Further OT  Equipment needs: None       ADLs:    Eating: Eating  Assistance Needed: Independent  Comment: Pt able to open all containers/packages and feed self  CARE Score: 6  Discharge Goal: Independent       Oral Hygiene: Oral Hygiene  Assistance Needed: Independent  Comment: seated at sink  CARE Score: 6  Discharge Goal: Independent    UB/LB Bathing: Shower/Bathe Self  Assistance Needed: Independent  Comment: Pt completed full shower seated with lateral lean to wash bottoom; using LHS to wash

## 2023-11-08 NOTE — DISCHARGE INSTRUCTIONS
Your information:  Name: Radha Hartmann  : 1940    Your instructions:    Pt is discharging to home with [address] [facility], [address]  [phone]  A representative from [facility] will contact you at home to schedule your home care needs    What to do after you leave the hospital:    Recommended diet: {diet:62205}    Recommended activity: {discharge activity:79820}    The following personal items were collected during your admission and were returned to you:    Belongings  Dental Appliances: None  Vision - Corrective Lenses: None  Hearing Aid: None  Clothing: Jacket/Coat, Shirt, Pants, Footwear  Jewelry: None  Body Piercings Removed: N/A  Electronic Devices: None  Weapons (Notify Protective Services/Security): None  Home Medications: None  Valuables Given To: Patient  Provide Name(s) of Who Valuable(s) Were Given To: self  Responsible person(s) in the waiting room: Able to speak to all 3 sons  Patient approves for provider to speak to responsible person post operatively: Yes    Information obtained by:  By signing below, I understand that if any problems occur once I leave the hospital I am to contact ***. I understand and acknowledge receipt of the instructions indicated above.

## 2023-11-08 NOTE — DISCHARGE INSTR - ACTIVITY
Pt is discharging to home with [facility], [address]  [phone]  A representative from [facility] will contact you at home to schedule your home care needs

## 2023-11-08 NOTE — CARE COORDINATION
Left message for Jairo Listen at [facility]. Awaiting a return call. Received a call from [facility] and follow up was scheduled. AVS updated.

## 2023-11-08 NOTE — PATIENT CARE CONFERENCE
ACUTE REHAB TEAM CONFERENCE SUMMARY   [facility]    NAME: Owen Castaneda  : 1940 ADMIT DATE: 10/28/2023    Rehab Admitting Dx:Sepsis ([address]) [A41.9]  Patient Comorbid Conditions: Active Hospital Problems    Diagnosis Date Noted    Anemia of chronic disease [D63.8] 2012     Priority: Medium     Class: Acute    Chronic combined systolic (congestive) and diastolic (congestive) heart failure (HCC) [I50.42] 10/28/2023    Chronic idiopathic constipation [K59.04] 10/28/2023    Uncontrolled type 2 diabetes mellitus with hyperglycemia ([address]) [E11.65]     Sepsis ([address]) [A41.9] 2014     Date: 2023    CASE MANAGEMENT  Current issues/needs regarding patient and family discharge status: 2WW, WC, BSC, cane, Hospital Bed, Pre-existing with [facility]  Patient and family goal: [address] son and [address] (Updated in QI)  Short Term Goals  Time Frame for Short Term Goals: 1 week  Short Term Goal 1: Pt will complete supine to sitting EOB with CGA  Short Term Goal 2: Pt will ambulate 120' with RW and CGA  Short Term Goal 3: Pt will propel self in [facility] for 150' with supervision A. Long Term Goals  Time Frame for Long Term Goals : 3 weeks  Long Term Goal 1: Pt will ambulate 150'+' with LRAD and CGA-Sup A  Long Term Goal 2: Pt will be independent with WC mobility for up to 150'  Long Term Goal 3: Pt will perform sit-to-stand transfers to LRAD with supervision A. Impairments/deficits, barriers:     Body Structures, Functions, Activity Limitations Requiring Skilled Therapeutic Intervention: Decreased functional mobility , Decreased ADL status, Decreased body mechanics, Decreased strength, Decreased endurance, Decreased sensation, Decreased balance, Decreased posture     Therapy Prognosis: Good  Decision Making: High Complexity     Equipment needed at discharge:2ww      PT IRF-LESTER scores since initial assessment  Bed Mobility:   Roll Left and Right  Assistance Needed:

## 2023-11-08 NOTE — PLAN OF CARE
Problem: Discharge Planning  Goal: Discharge to home or other facility with appropriate resources  11/8/2023 1859 by Won Victoria RN  Outcome: Progressing  11/8/2023 1859 by Won Victoria RN  Outcome: Progressing     Problem: Safety - Adult  Goal: Free from fall injury  11/8/2023 1859 by Won Victoria RN  Outcome: Progressing  11/8/2023 1859 by Won Victoria RN  Outcome: Progressing

## 2023-11-09 VITALS
DIASTOLIC BLOOD PRESSURE: 68 MMHG | HEIGHT: 68 IN | HEART RATE: 68 BPM | OXYGEN SATURATION: 97 % | BODY MASS INDEX: 30.74 KG/M2 | SYSTOLIC BLOOD PRESSURE: 147 MMHG | TEMPERATURE: 97.3 F | WEIGHT: 202.82 LBS | RESPIRATION RATE: 16 BRPM

## 2023-11-09 LAB
GLUCOSE BLD-MCNC: 201 MG/DL (ref 70–99)
GLUCOSE BLD-MCNC: 346 MG/DL (ref 70–99)

## 2023-11-09 PROCEDURE — 6370000000 HC RX 637 (ALT 250 FOR IP): Performed by: INTERNAL MEDICINE

## 2023-11-09 PROCEDURE — 82962 GLUCOSE BLOOD TEST: CPT

## 2023-11-09 PROCEDURE — 2580000003 HC RX 258: Performed by: INTERNAL MEDICINE

## 2023-11-09 PROCEDURE — 6360000002 HC RX W HCPCS: Performed by: INTERNAL MEDICINE

## 2023-11-09 PROCEDURE — 94761 N-INVAS EAR/PLS OXIMETRY MLT: CPT

## 2023-11-09 RX ORDER — ATORVASTATIN CALCIUM 20 MG/1
20 TABLET, FILM COATED ORAL NIGHTLY
Qty: 30 TABLET | Refills: 0 | Status: SHIPPED | OUTPATIENT
Start: 2023-11-09

## 2023-11-09 RX ORDER — LOSARTAN POTASSIUM AND HYDROCHLOROTHIAZIDE 12.5; 5 MG/1; MG/1
1 TABLET ORAL 2 TIMES DAILY
Qty: 60 TABLET | Refills: 0 | Status: SHIPPED | OUTPATIENT
Start: 2023-11-09

## 2023-11-09 RX ADMIN — SODIUM CHLORIDE, PRESERVATIVE FREE 10 ML: 5 INJECTION INTRAVENOUS at 09:12

## 2023-11-09 RX ADMIN — ACETAMINOPHEN 650 MG: 325 TABLET ORAL at 06:37

## 2023-11-09 RX ADMIN — CARVEDILOL 25 MG: 25 TABLET, FILM COATED ORAL at 09:12

## 2023-11-09 RX ADMIN — LOSARTAN POTASSIUM AND HYDROCHLOROTHIAZIDE 1 TABLET: 50; 12.5 TABLET, FILM COATED ORAL at 09:12

## 2023-11-09 RX ADMIN — HEPARIN SODIUM 5000 UNITS: 5000 INJECTION INTRAVENOUS; SUBCUTANEOUS at 06:37

## 2023-11-09 RX ADMIN — MICONAZOLE NITRATE: 2 POWDER TOPICAL at 09:13

## 2023-11-09 RX ADMIN — LEVOTHYROXINE SODIUM 75 MCG: 0.07 TABLET ORAL at 06:37

## 2023-11-09 RX ADMIN — AMLODIPINE BESYLATE 10 MG: 10 TABLET ORAL at 09:12

## 2023-11-09 RX ADMIN — INSULIN LISPRO 1 UNITS: 100 INJECTION, SOLUTION INTRAVENOUS; SUBCUTANEOUS at 09:12

## 2023-11-09 RX ADMIN — PANTOPRAZOLE SODIUM 40 MG: 40 TABLET, DELAYED RELEASE ORAL at 06:37

## 2023-11-09 RX ADMIN — ASPIRIN 81 MG CHEWABLE TABLET 81 MG: 81 TABLET CHEWABLE at 09:12

## 2023-11-09 NOTE — CARE COORDINATION
ARU  Discharge Summary    D/C Date: 11/09/2023    Patient discharged to: Home with son    Transported by:  Son    Referrals made to: [facility] (AVS faxed)    Additional information: Follow up appointments scheduled with Alexis Mensah MD Tuesday Nov 14, 2023 2:45 PM, Janay Montanez MD Monday Nov 20, 2023 9:50 AM, Denis Sandifer, MD Tuesday Nov 28, 2023 5:00 PM. Patient notified, AVS updated    Caregiver training: none requested    Discharge BIMS completed:  [x]

## 2023-11-09 NOTE — PROGRESS NOTES
Comprehensive Nutrition Assessment    Type and Reason for Visit:  Reassess    Nutrition Recommendations/Plan:   Continue current diet and oral nutrition supplement     Malnutrition Assessment:  Malnutrition Status:  Insufficient data (10/30/23 1225)    Context:  Acute Illness       Nutrition Assessment:    Pt continues feeding self with adequate po intake, consuming 76% to all of meals. Diabetic supplement ordered daily to optimize intake. Maintaining wt over stay. Plan for home today noted. Continue to follow as low nutrition risk. Nutrition Related Findings:    Glu 201   Wound Type: None       Current Nutrition Intake & Therapies:    Average Meal Intake: %  Average Supplements Intake: Unable to assess  ADULT DIET; Regular; 4 carb choices (60 gm/meal)  ADULT ORAL NUTRITION SUPPLEMENT; Breakfast, Lunch; Diabetic Oral Supplement    Anthropometric Measures:  Height: 172.7 cm (5' 7.99\")  Ideal Body Weight (IBW): 154 lbs (70 kg)    Admission Body Weight: 87.7 kg (193 lb 5.5 oz)  Current Body Weight: 92 kg (202 lb 13.2 oz), 125.4 % IBW. Weight Source: Bed Scale  Current BMI (kg/m2): 30.8  Usual Body Weight: 89.4 kg (197 lb 1.5 oz) (hx October 2022)  % Weight Change (Calculated): -2  Weight Adjustment For: No Adjustment                 BMI Categories: Obese Class 1 (BMI 30.0-34. 9)    Estimated Daily Nutrient Needs:  Energy Requirements Based On: Formula  Weight Used for Energy Requirements: Current  Energy (kcal/day): 6475-9469 (miffl[address] st jeor)  Weight Used for Protein Requirements: Ideal  Protein (g/day): 70-84 (1-1.2 g/kg)  Method Used for Fluid Requirements: 1 ml/kcal  Fluid (ml/day): 2000    Nutrition Diagnosis:   No nutrition diagnosis at this time related to other (comment) (potential reduced appetite in illness) as evidenced by other (comment) (limited po data)    Nutrition Interventions:   Food and/or Nutrient Delivery: Continue Current Diet, Continue Oral Nutrition Supplement  Nutrition

## 2023-11-09 NOTE — PROGRESS NOTES
JVD.    Pulmonary: No wheezes or rales. Unlabored respirations. Cardiac: Regular rate and rhythm. Abdomen: Patient's abdomen is soft and nondistended. Bowel sounds were present throughout. There was no rebound, guarding or masses noted. Upper extremities: He was able to bring both hands up to his chin. He cannot raise them overhead due to shoulder pains. Fair dexterity. Lower extremities: Calves soft. Heels clear. No DTRs. Giveaway with MMT yet. Sitting balance was good. Standing balance was fair-.    Lab Results   Component Value Date    WBC 8.1 10/25/2023    HGB 8.5 (L) 10/25/2023    HCT 30.7 (L) 10/25/2023    .6 (H) 10/25/2023     10/25/2023     Lab Results   Component Value Date    INR 1.02 05/22/2023    INR 1.08 10/24/2022    INR 1.10 09/12/2022    PROTIME 12.9 05/22/2023    PROTIME 14.0 10/24/2022    PROTIME 14.2 09/12/2022     Lab Results   Component Value Date    CREATININE 1.9 (H) 11/06/2023    BUN 30 (H) 11/06/2023     11/06/2023    K 5.0 11/06/2023     11/06/2023    CO2 24 11/06/2023     Lab Results   Component Value Date    ALT 10 11/06/2023    AST 13 (L) 11/06/2023    ALKPHOS 100 11/06/2023    BILITOT 0.2 11/06/2023       Expected length of stay  prior to a supervised level of function for discharge home with a walker and [address] OT/PT is 11/9/2023. Recommendations:    Sepsis with gait disturbance: His performance in therapy indicates no new worries of infection or bleeding. Although he tires quickly, he does engage purposefully in the daily occupational and physical therapy. With some ongoing weakness, poor balance and altered sensation, he remains at some risk for fall with injury during standing ADLs and mobility activities. Continue providing him adaptive equipment training with strengthening exercises, balance retraining and conditioning development.   He will get supervision in the home to help monitor his compliance with adaptive equipment use

## 2023-11-09 NOTE — PROGRESS NOTES
have noticed. Or the opposite- being so fidgety or restless that you have been moving around a lot more than usual.   [x] 0. No  [] 1. Yes  [] 9. No Response [x] 0. Never or one day  [] 1.  2-6 days (several days)  [] 2.  7-11 days (half or more of days)  [] 3.  12-14 days (nearly every day   Thoughts that you would be better off dead, or of hurting yourself in some way. [x] 0. No  [] 1. Yes  [] 9. No Response [x] 0. Never or one day  [] 1.  2-6 days (several days)  [] 2.  7-11 days (half or more of days)  [] 3.  12-14 days (nearly every day     Social Isolation  \"How often do you feel lonely or isolated from those around you? \"  [] 0. Never  [x] 1. Rarely  [] 2. Sometimes  [] 3. Often  [] 4. Always  [] 8. Patient unable to respond    Pain Effect on Sleep  \"Over the past 5 days, how much of the time has pain made it hard for you to sleep at night? \"  []  0. Does not apply - I have not had any pain or hurting in the past 5 days  [x]  1. Rarely or not at all  []  2. Occasionally  []  3. Frequently  []  4. Almost constantly  []  8. Unable to answer  **If the patient answers \"0. Does not apply\" to this question, skip the next two \"Pain Effect. Merced Romero \" questions**      Pain Interference with Therapy Activities  \"Over the past 5 days, how often have you limited your participation in rehabilitation therapy sessions due to pain? \"  [x]  1. Rarely or not at all  []  2. Occasionally  []  3. Frequently  []  4. Almost constantly  []  8. Unable to answer    Pain Interference with Day-to-Day Activities: \"Over the past 5 days, how often have you limited your day-to-day activities (excluding rehabilitation therapy session)? \"  [x]  1. Rarely or not at all  []  2. Occasionally  []  3. Frequently  []  4. Almost constantly  []  8.   Unable to answer

## 2023-11-10 NOTE — DISCHARGE SUMMARY
Patient Name: Rehaan Salgado  Patient :  1940  Patient MRN:   [id number]      Admission Date:  10/28/2023  Discharge Date: 2023    Admitting diagnosis: Sepsis ([place] [address])     Comorbid diagnoses impacting rehabilitation: Generalized weakness, gait disturbance, pyelonephritis, uncontrolled diabetes type 2 with hyperglycemia, chronic kidney disease stage IV, anemia of chronic disease, chronic combined congestive heart failure, essential hypertension, hyperkalemia, constipation     Discharging diagnosis: Sepsis ([place] [address])     Comorbid diagnoses impacting rehabilitation: Generalized weakness, gait disturbance, pyelonephritis, uncontrolled diabetes type 2 with hyperglycemia, chronic kidney disease stage IV, anemia of chronic disease, chronic combined congestive heart failure, essential hypertension, hyperkalemia, constipation      History of present illness: The patient is an 78-year-old right-hand-dominant male with a chronic suprapubic catheter use because of prostate cancer and prior bladder surgeries. He presented through our ED on 10/23/2023 with increasing abdominal pain, malaise and fever in the 2 to 3 days prior to admission. His evaluation identified acute pyelonephritis with constipation, hyperkalemia and acute kidney injury on his chronic kidney disease. He had been treated with IV antibiotics and had been seen by urology. A procedure was planned for the first week he would be on rehab to exchange his suprapubic catheter and possibly place a ureteral stent. Patient had obstipation, hyperglycemia and lower blood pressure. Medications for blood pressure had to be adjusted and IV fluids were administered. Prior (baseline) level of function: Independent.     Current level of function:         Current  IRF-LESTER and Goals:   Occupational Therapy:    Short Term Goals  Time Frame for Short Term Goals: STGs=LTGs :   Long Term Goals  Time Frame for Long Term Goals : ~10 days or until d/c  Long Term Goal

## 2024-02-01 ENCOUNTER — APPOINTMENT (OUTPATIENT)
Dept: CT IMAGING | Age: 84
DRG: 639 | End: 2024-02-01
Payer: MEDICARE

## 2024-02-01 ENCOUNTER — HOSPITAL ENCOUNTER (INPATIENT)
Age: 84
LOS: 4 days | Discharge: HOME | DRG: 639 | End: 2024-02-05
Attending: STUDENT IN AN ORGANIZED HEALTH CARE EDUCATION/TRAINING PROGRAM | Admitting: STUDENT IN AN ORGANIZED HEALTH CARE EDUCATION/TRAINING PROGRAM
Payer: MEDICARE

## 2024-02-01 ENCOUNTER — APPOINTMENT (OUTPATIENT)
Dept: GENERAL RADIOLOGY | Age: 84
DRG: 639 | End: 2024-02-01
Payer: MEDICARE

## 2024-02-01 DIAGNOSIS — D63.8 ANEMIA OF CHRONIC DISEASE: ICD-10-CM

## 2024-02-01 DIAGNOSIS — E16.2 HYPOGLYCEMIA: Primary | ICD-10-CM

## 2024-02-01 DIAGNOSIS — E87.5 HYPERKALEMIA: ICD-10-CM

## 2024-02-01 LAB
ALBUMIN SERPL-MCNC: 3.9 GM/DL (ref 3.4–5)
ALP BLD-CCNC: 79 IU/L (ref 40–129)
ALT SERPL-CCNC: 15 U/L (ref 10–40)
ANION GAP SERPL CALCULATED.3IONS-SCNC: 9 MMOL/L (ref 7–16)
AST SERPL-CCNC: 22 IU/L (ref 15–37)
BASOPHILS ABSOLUTE: 0 K/CU MM
BASOPHILS RELATIVE PERCENT: 0.2 % (ref 0–1)
BILIRUB SERPL-MCNC: 0.3 MG/DL (ref 0–1)
BILIRUBIN DIRECT: 0.2 MG/DL (ref 0–0.3)
BILIRUBIN, INDIRECT: 0.1 MG/DL (ref 0–0.7)
BUN SERPL-MCNC: 46 MG/DL (ref 6–23)
CALCIUM SERPL-MCNC: 8.8 MG/DL (ref 8.3–10.6)
CHLORIDE BLD-SCNC: 108 MMOL/L (ref 99–110)
CO2: 24 MMOL/L (ref 21–32)
CREAT SERPL-MCNC: 2.4 MG/DL (ref 0.9–1.3)
DIFFERENTIAL TYPE: ABNORMAL
EOSINOPHILS ABSOLUTE: 0.5 K/CU MM
EOSINOPHILS RELATIVE PERCENT: 6.5 % (ref 0–3)
GFR SERPL CREATININE-BSD FRML MDRD: 26 ML/MIN/1.73M2
GLUCOSE BLD-MCNC: 105 MG/DL (ref 70–99)
GLUCOSE BLD-MCNC: 177 MG/DL (ref 70–99)
GLUCOSE BLD-MCNC: 43 MG/DL (ref 70–99)
GLUCOSE SERPL-MCNC: 41 MG/DL (ref 70–99)
HCT VFR BLD CALC: 25.9 % (ref 42–52)
HEMOGLOBIN: 7.9 GM/DL (ref 13.5–18)
IMMATURE NEUTROPHIL %: 0.2 % (ref 0–0.43)
LYMPHOCYTES ABSOLUTE: 1.4 K/CU MM
LYMPHOCYTES RELATIVE PERCENT: 16.7 % (ref 24–44)
MCH RBC QN AUTO: 31 PG (ref 27–31)
MCHC RBC AUTO-ENTMCNC: 30.5 % (ref 32–36)
MCV RBC AUTO: 101.6 FL (ref 78–100)
MONOCYTES ABSOLUTE: 0.7 K/CU MM
MONOCYTES RELATIVE PERCENT: 8.4 % (ref 0–4)
NUCLEATED RBC %: 0 %
PDW BLD-RTO: 15.8 % (ref 11.7–14.9)
PLATELET # BLD: 196 K/CU MM (ref 140–440)
POTASSIUM SERPL-SCNC: 5.7 MMOL/L (ref 3.5–5.1)
RBC # BLD: 2.55 M/CU MM (ref 4.6–6.2)
REASON FOR REJECTION: NORMAL
REJECTED TEST: NORMAL
SEGMENTED NEUTROPHILS ABSOLUTE COUNT: 5.6 K/CU MM
SEGMENTED NEUTROPHILS RELATIVE PERCENT: 68 % (ref 36–66)
SODIUM BLD-SCNC: 141 MMOL/L (ref 135–145)
TOTAL IMMATURE NEUTOROPHIL: 0.02 K/CU MM
TOTAL NUCLEATED RBC: 0 K/CU MM
TOTAL PROTEIN: 7.8 GM/DL (ref 6.4–8.2)
WBC # BLD: 8.3 K/CU MM (ref 4–10.5)

## 2024-02-01 PROCEDURE — 93005 ELECTROCARDIOGRAM TRACING: CPT | Performed by: STUDENT IN AN ORGANIZED HEALTH CARE EDUCATION/TRAINING PROGRAM

## 2024-02-01 PROCEDURE — 80048 BASIC METABOLIC PNL TOTAL CA: CPT

## 2024-02-01 PROCEDURE — 2500000003 HC RX 250 WO HCPCS: Performed by: STUDENT IN AN ORGANIZED HEALTH CARE EDUCATION/TRAINING PROGRAM

## 2024-02-01 PROCEDURE — 73030 X-RAY EXAM OF SHOULDER: CPT

## 2024-02-01 PROCEDURE — 2140000000 HC CCU INTERMEDIATE R&B

## 2024-02-01 PROCEDURE — 6370000000 HC RX 637 (ALT 250 FOR IP): Performed by: STUDENT IN AN ORGANIZED HEALTH CARE EDUCATION/TRAINING PROGRAM

## 2024-02-01 PROCEDURE — 99285 EMERGENCY DEPT VISIT HI MDM: CPT

## 2024-02-01 PROCEDURE — 82962 GLUCOSE BLOOD TEST: CPT

## 2024-02-01 PROCEDURE — 2580000003 HC RX 258: Performed by: STUDENT IN AN ORGANIZED HEALTH CARE EDUCATION/TRAINING PROGRAM

## 2024-02-01 PROCEDURE — 80076 HEPATIC FUNCTION PANEL: CPT

## 2024-02-01 PROCEDURE — 72125 CT NECK SPINE W/O DYE: CPT

## 2024-02-01 PROCEDURE — 96374 THER/PROPH/DIAG INJ IV PUSH: CPT

## 2024-02-01 PROCEDURE — 85025 COMPLETE CBC W/AUTO DIFF WBC: CPT

## 2024-02-01 PROCEDURE — 70450 CT HEAD/BRAIN W/O DYE: CPT

## 2024-02-01 RX ORDER — SODIUM CHLORIDE 9 MG/ML
INJECTION, SOLUTION INTRAVENOUS PRN
Status: DISCONTINUED | OUTPATIENT
Start: 2024-02-01 | End: 2024-02-05 | Stop reason: HOSPADM

## 2024-02-01 RX ORDER — ONDANSETRON 4 MG/1
4 TABLET, ORALLY DISINTEGRATING ORAL EVERY 8 HOURS PRN
Status: DISCONTINUED | OUTPATIENT
Start: 2024-02-01 | End: 2024-02-05 | Stop reason: HOSPADM

## 2024-02-01 RX ORDER — LEVOTHYROXINE SODIUM 0.07 MG/1
75 TABLET ORAL DAILY
Status: DISCONTINUED | OUTPATIENT
Start: 2024-02-02 | End: 2024-02-05 | Stop reason: HOSPADM

## 2024-02-01 RX ORDER — DEXTROSE MONOHYDRATE 25 G/50ML
25 INJECTION, SOLUTION INTRAVENOUS ONCE
Status: COMPLETED | OUTPATIENT
Start: 2024-02-01 | End: 2024-02-01

## 2024-02-01 RX ORDER — ENOXAPARIN SODIUM 100 MG/ML
40 INJECTION SUBCUTANEOUS DAILY
Status: DISCONTINUED | OUTPATIENT
Start: 2024-02-02 | End: 2024-02-03

## 2024-02-01 RX ORDER — DOCUSATE SODIUM 100 MG/1
100 CAPSULE, LIQUID FILLED ORAL 2 TIMES DAILY PRN
Status: DISCONTINUED | OUTPATIENT
Start: 2024-02-01 | End: 2024-02-05 | Stop reason: HOSPADM

## 2024-02-01 RX ORDER — ACETAMINOPHEN 500 MG
1000 TABLET ORAL ONCE
Status: COMPLETED | OUTPATIENT
Start: 2024-02-01 | End: 2024-02-01

## 2024-02-01 RX ORDER — PANTOPRAZOLE SODIUM 40 MG/1
40 TABLET, DELAYED RELEASE ORAL
Status: DISCONTINUED | OUTPATIENT
Start: 2024-02-02 | End: 2024-02-05 | Stop reason: HOSPADM

## 2024-02-01 RX ORDER — GLUCAGON 1 MG/ML
1 KIT INJECTION PRN
Status: DISCONTINUED | OUTPATIENT
Start: 2024-02-01 | End: 2024-02-05 | Stop reason: HOSPADM

## 2024-02-01 RX ORDER — POLYETHYLENE GLYCOL 3350 17 G/17G
17 POWDER, FOR SOLUTION ORAL DAILY PRN
Status: DISCONTINUED | OUTPATIENT
Start: 2024-02-01 | End: 2024-02-05 | Stop reason: HOSPADM

## 2024-02-01 RX ORDER — ACETAMINOPHEN 325 MG/1
650 TABLET ORAL EVERY 6 HOURS PRN
Status: DISCONTINUED | OUTPATIENT
Start: 2024-02-01 | End: 2024-02-05 | Stop reason: HOSPADM

## 2024-02-01 RX ORDER — ACETAMINOPHEN 650 MG/1
650 SUPPOSITORY RECTAL EVERY 6 HOURS PRN
Status: DISCONTINUED | OUTPATIENT
Start: 2024-02-01 | End: 2024-02-05 | Stop reason: HOSPADM

## 2024-02-01 RX ORDER — CARVEDILOL 25 MG/1
25 TABLET ORAL 2 TIMES DAILY
Status: DISCONTINUED | OUTPATIENT
Start: 2024-02-01 | End: 2024-02-05 | Stop reason: HOSPADM

## 2024-02-01 RX ORDER — ALBUTEROL SULFATE 90 UG/1
1 AEROSOL, METERED RESPIRATORY (INHALATION) EVERY 6 HOURS PRN
Status: DISCONTINUED | OUTPATIENT
Start: 2024-02-01 | End: 2024-02-05 | Stop reason: HOSPADM

## 2024-02-01 RX ORDER — SODIUM CHLORIDE, SODIUM LACTATE, POTASSIUM CHLORIDE, CALCIUM CHLORIDE 600; 310; 30; 20 MG/100ML; MG/100ML; MG/100ML; MG/100ML
INJECTION, SOLUTION INTRAVENOUS CONTINUOUS
Status: DISPENSED | OUTPATIENT
Start: 2024-02-01 | End: 2024-02-02

## 2024-02-01 RX ORDER — ONDANSETRON 2 MG/ML
4 INJECTION INTRAMUSCULAR; INTRAVENOUS EVERY 6 HOURS PRN
Status: DISCONTINUED | OUTPATIENT
Start: 2024-02-01 | End: 2024-02-05 | Stop reason: HOSPADM

## 2024-02-01 RX ORDER — DEXTROSE MONOHYDRATE 100 MG/ML
INJECTION, SOLUTION INTRAVENOUS CONTINUOUS PRN
Status: DISCONTINUED | OUTPATIENT
Start: 2024-02-01 | End: 2024-02-05 | Stop reason: HOSPADM

## 2024-02-01 RX ORDER — SENNOSIDES A AND B 8.6 MG/1
2 TABLET, FILM COATED ORAL DAILY
Status: DISCONTINUED | OUTPATIENT
Start: 2024-02-01 | End: 2024-02-05 | Stop reason: HOSPADM

## 2024-02-01 RX ORDER — MAGNESIUM SULFATE IN WATER 40 MG/ML
2000 INJECTION, SOLUTION INTRAVENOUS PRN
Status: DISCONTINUED | OUTPATIENT
Start: 2024-02-01 | End: 2024-02-05 | Stop reason: HOSPADM

## 2024-02-01 RX ORDER — AMLODIPINE BESYLATE 10 MG/1
10 TABLET ORAL DAILY
Status: DISCONTINUED | OUTPATIENT
Start: 2024-02-02 | End: 2024-02-05 | Stop reason: HOSPADM

## 2024-02-01 RX ORDER — SODIUM CHLORIDE 0.9 % (FLUSH) 0.9 %
5-40 SYRINGE (ML) INJECTION EVERY 12 HOURS SCHEDULED
Status: DISCONTINUED | OUTPATIENT
Start: 2024-02-01 | End: 2024-02-05 | Stop reason: HOSPADM

## 2024-02-01 RX ORDER — ATORVASTATIN CALCIUM 10 MG/1
20 TABLET, FILM COATED ORAL NIGHTLY
Status: DISCONTINUED | OUTPATIENT
Start: 2024-02-01 | End: 2024-02-05 | Stop reason: HOSPADM

## 2024-02-01 RX ORDER — SODIUM CHLORIDE 0.9 % (FLUSH) 0.9 %
5-40 SYRINGE (ML) INJECTION PRN
Status: DISCONTINUED | OUTPATIENT
Start: 2024-02-01 | End: 2024-02-05 | Stop reason: HOSPADM

## 2024-02-01 RX ORDER — ASPIRIN 81 MG/1
81 TABLET, CHEWABLE ORAL NIGHTLY
Status: DISCONTINUED | OUTPATIENT
Start: 2024-02-01 | End: 2024-02-05 | Stop reason: HOSPADM

## 2024-02-01 RX ORDER — POTASSIUM CHLORIDE 7.45 MG/ML
10 INJECTION INTRAVENOUS PRN
Status: DISCONTINUED | OUTPATIENT
Start: 2024-02-01 | End: 2024-02-05 | Stop reason: HOSPADM

## 2024-02-01 RX ADMIN — ACETAMINOPHEN 1000 MG: 500 TABLET ORAL at 17:38

## 2024-02-01 RX ADMIN — SODIUM ZIRCONIUM CYCLOSILICATE 10 G: 5 POWDER, FOR SUSPENSION ORAL at 22:43

## 2024-02-01 RX ADMIN — ASPIRIN 81 MG 81 MG: 81 TABLET ORAL at 22:43

## 2024-02-01 RX ADMIN — CARVEDILOL 25 MG: 25 TABLET, FILM COATED ORAL at 22:43

## 2024-02-01 RX ADMIN — DEXTROSE MONOHYDRATE 25 G: 25 INJECTION, SOLUTION INTRAVENOUS at 18:37

## 2024-02-01 RX ADMIN — SODIUM CHLORIDE, POTASSIUM CHLORIDE, SODIUM LACTATE AND CALCIUM CHLORIDE: 600; 310; 30; 20 INJECTION, SOLUTION INTRAVENOUS at 23:19

## 2024-02-01 ASSESSMENT — PAIN DESCRIPTION - ORIENTATION: ORIENTATION: RIGHT

## 2024-02-01 ASSESSMENT — PAIN DESCRIPTION - LOCATION: LOCATION: ARM

## 2024-02-01 ASSESSMENT — PAIN SCALES - GENERAL: PAINLEVEL_OUTOF10: 7

## 2024-02-01 ASSESSMENT — PAIN DESCRIPTION - DESCRIPTORS: DESCRIPTORS: TINGLING

## 2024-02-01 NOTE — ED PROVIDER NOTES
Lateral leads  Nonspecific T wave abnormality no longer evident in Lateral leads  QT has shortened           RADIOLOGY:  CT HEAD WO CONTRAST   Final Result   1. No acute intracranial abnormality.         CT CERVICAL SPINE WO CONTRAST   Final Result   No acute abnormality of the cervical spine.      Stable postsurgical changes from C3 through C7.      Bilateral maxillary sinus disease incompletely visualized.         XR SHOULDER RIGHT (MIN 2 VIEWS)   Final Result   1. No acute abnormality.         XR SHOULDER LEFT (MIN 2 VIEWS)   Final Result   1. No acute abnormality.              EKG  EKG Interpretation by Me: EKG shows a normal sinus rhythm normal rate, leftward axis, no ST elevation or ST depression T waves unremarkable.  When compared to EKG on 5/21/2023 no acute findings.    All EKG's are interpreted by the Emergency Department Physician who either signs or Co-signs this chart in the absence of a cardiologist.      EMERGENCY DEPARTMENT COURSE & MDM:      Medical Decision Making  Patient arrives here for hypoglycemia and mechanical fall.  On examination he is alert and oriented x 3, GCS 15, remembers the whole event, has no focal neurological deficits.  Does have a small amount of swelling on his posterior scalp but no associated bruising or laceration.  Both of his shoulders have full range of motion I do not feel any crepitus or see any bruising, pelvis stable, equal pulses throughout, no tenderness of the hips, back.  There is very very mild midline cervical spine tenderness.    Suspect his hypoglycemia is secondary to poor oral intake and insulin.  Will plan to get a CBC to rule out anemia, BMP to recheck sugar and electrolytes.  CT head and cervical spine bilateral shoulder x-rays.  Tylenol for pain    CT head and CT cervical spine as well as shoulder x-rays are unremarkable for injury.    CBC shows chronic stable anemia.  BMP remarkable for chronic anemia but hypoglycemia 41.  Patient was given food and

## 2024-02-02 LAB
25(OH)D3 SERPL-MCNC: 9.59 NG/ML
ALBUMIN SERPL-MCNC: 3.5 GM/DL (ref 3.4–5)
ALP BLD-CCNC: 75 IU/L (ref 40–128)
ALT SERPL-CCNC: 12 U/L (ref 10–40)
ANION GAP SERPL CALCULATED.3IONS-SCNC: 7 MMOL/L (ref 7–16)
AST SERPL-CCNC: 15 IU/L (ref 15–37)
BACTERIA: NEGATIVE /HPF
BASOPHILS ABSOLUTE: 0 K/CU MM
BASOPHILS RELATIVE PERCENT: 0.5 % (ref 0–1)
BILIRUB SERPL-MCNC: 0.2 MG/DL (ref 0–1)
BILIRUBIN URINE: NEGATIVE MG/DL
BLOOD, URINE: ABNORMAL
BUN SERPL-MCNC: 43 MG/DL (ref 6–23)
CALCIUM SERPL-MCNC: 8.5 MG/DL (ref 8.3–10.6)
CAST TYPE: ABNORMAL /HPF
CHLORIDE BLD-SCNC: 107 MMOL/L (ref 99–110)
CLARITY: ABNORMAL
CO2: 24 MMOL/L (ref 21–32)
COLOR: YELLOW
COMMENT UA: ABNORMAL
CREAT SERPL-MCNC: 2.3 MG/DL (ref 0.9–1.3)
CRYSTAL TYPE: NEGATIVE /HPF
DIFFERENTIAL TYPE: ABNORMAL
EKG ATRIAL RATE: 56 BPM
EKG DIAGNOSIS: NORMAL
EKG Q-T INTERVAL: 402 MS
EKG QRS DURATION: 72 MS
EKG QTC CALCULATION (BAZETT): 387 MS
EKG R AXIS: -8 DEGREES
EKG T AXIS: 12 DEGREES
EKG VENTRICULAR RATE: 56 BPM
EOSINOPHILS ABSOLUTE: 0.6 K/CU MM
EOSINOPHILS RELATIVE PERCENT: 8.9 % (ref 0–3)
EPITHELIAL CELLS, UA: 1 /HPF
ESTIMATED AVERAGE GLUCOSE: 134 MG/DL
FOLATE SERPL-MCNC: 14.5 NG/ML (ref 3.1–17.5)
GFR SERPL CREATININE-BSD FRML MDRD: 27 ML/MIN/1.73M2
GLUCOSE BLD-MCNC: 133 MG/DL (ref 70–99)
GLUCOSE BLD-MCNC: 134 MG/DL (ref 70–99)
GLUCOSE BLD-MCNC: 228 MG/DL (ref 70–99)
GLUCOSE BLD-MCNC: 235 MG/DL (ref 70–99)
GLUCOSE BLD-MCNC: 246 MG/DL (ref 70–99)
GLUCOSE BLD-MCNC: 540 MG/DL (ref 70–99)
GLUCOSE BLD-MCNC: 92 MG/DL (ref 70–99)
GLUCOSE SERPL-MCNC: 115 MG/DL (ref 70–99)
GLUCOSE, URINE: 100 MG/DL
HBA1C MFR BLD: 6.3 % (ref 4.2–6.3)
HCT VFR BLD CALC: 24.3 % (ref 42–52)
HEMOGLOBIN: 7.5 GM/DL (ref 13.5–18)
IMMATURE NEUTROPHIL %: 0.3 % (ref 0–0.43)
INR BLD: 1.2 INDEX
KETONES, URINE: NEGATIVE MG/DL
LEUKOCYTE ESTERASE, URINE: ABNORMAL
LYMPHOCYTES ABSOLUTE: 1.5 K/CU MM
LYMPHOCYTES RELATIVE PERCENT: 23 % (ref 24–44)
MCH RBC QN AUTO: 30.9 PG (ref 27–31)
MCHC RBC AUTO-ENTMCNC: 30.9 % (ref 32–36)
MCV RBC AUTO: 100 FL (ref 78–100)
MONOCYTES ABSOLUTE: 0.7 K/CU MM
MONOCYTES RELATIVE PERCENT: 10.2 % (ref 0–4)
NITRITE URINE, QUANTITATIVE: POSITIVE
NUCLEATED RBC %: 0 %
PDW BLD-RTO: 15.8 % (ref 11.7–14.9)
PH, URINE: 8.5 (ref 5–8)
PLATELET # BLD: 216 K/CU MM (ref 140–440)
PMV BLD AUTO: 10.9 FL (ref 7.5–11.1)
POTASSIUM SERPL-SCNC: 5.2 MMOL/L (ref 3.5–5.1)
PROTEIN UA: 30 MG/DL
PROTHROMBIN TIME: 15.1 SECONDS (ref 11.7–14.5)
RBC # BLD: 2.43 M/CU MM (ref 4.6–6.2)
RBC URINE: 32 /HPF (ref 0–3)
SEGMENTED NEUTROPHILS ABSOLUTE COUNT: 3.7 K/CU MM
SEGMENTED NEUTROPHILS RELATIVE PERCENT: 57.1 % (ref 36–66)
SODIUM BLD-SCNC: 138 MMOL/L (ref 135–145)
SPECIFIC GRAVITY UA: 1.01 (ref 1–1.03)
TOTAL IMMATURE NEUTOROPHIL: 0.02 K/CU MM
TOTAL NUCLEATED RBC: 0 K/CU MM
TOTAL PROTEIN: 6.1 GM/DL (ref 6.4–8.2)
TSH SERPL DL<=0.005 MIU/L-ACNC: 3.07 UIU/ML (ref 0.27–4.2)
UROBILINOGEN, URINE: 0.2 MG/DL (ref 0.2–1)
VITAMIN B-12: 496.6 PG/ML (ref 211–911)
WBC # BLD: 6.4 K/CU MM (ref 4–10.5)
WBC UA: 231 /HPF (ref 0–2)

## 2024-02-02 PROCEDURE — 82306 VITAMIN D 25 HYDROXY: CPT

## 2024-02-02 PROCEDURE — 97163 PT EVAL HIGH COMPLEX 45 MIN: CPT

## 2024-02-02 PROCEDURE — 87086 URINE CULTURE/COLONY COUNT: CPT

## 2024-02-02 PROCEDURE — 82607 VITAMIN B-12: CPT

## 2024-02-02 PROCEDURE — 85610 PROTHROMBIN TIME: CPT

## 2024-02-02 PROCEDURE — 97530 THERAPEUTIC ACTIVITIES: CPT

## 2024-02-02 PROCEDURE — 6370000000 HC RX 637 (ALT 250 FOR IP): Performed by: STUDENT IN AN ORGANIZED HEALTH CARE EDUCATION/TRAINING PROGRAM

## 2024-02-02 PROCEDURE — 82746 ASSAY OF FOLIC ACID SERUM: CPT

## 2024-02-02 PROCEDURE — 6360000002 HC RX W HCPCS: Performed by: STUDENT IN AN ORGANIZED HEALTH CARE EDUCATION/TRAINING PROGRAM

## 2024-02-02 PROCEDURE — 2580000003 HC RX 258: Performed by: STUDENT IN AN ORGANIZED HEALTH CARE EDUCATION/TRAINING PROGRAM

## 2024-02-02 PROCEDURE — 84443 ASSAY THYROID STIM HORMONE: CPT

## 2024-02-02 PROCEDURE — 82962 GLUCOSE BLOOD TEST: CPT

## 2024-02-02 PROCEDURE — 6370000000 HC RX 637 (ALT 250 FOR IP): Performed by: INTERNAL MEDICINE

## 2024-02-02 PROCEDURE — 97116 GAIT TRAINING THERAPY: CPT

## 2024-02-02 PROCEDURE — 85025 COMPLETE CBC W/AUTO DIFF WBC: CPT

## 2024-02-02 PROCEDURE — 80053 COMPREHEN METABOLIC PANEL: CPT

## 2024-02-02 PROCEDURE — 93010 ELECTROCARDIOGRAM REPORT: CPT | Performed by: INTERNAL MEDICINE

## 2024-02-02 PROCEDURE — 2140000000 HC CCU INTERMEDIATE R&B

## 2024-02-02 PROCEDURE — 81001 URINALYSIS AUTO W/SCOPE: CPT

## 2024-02-02 PROCEDURE — 83036 HEMOGLOBIN GLYCOSYLATED A1C: CPT

## 2024-02-02 PROCEDURE — 87186 SC STD MICRODIL/AGAR DIL: CPT

## 2024-02-02 PROCEDURE — 87077 CULTURE AEROBIC IDENTIFY: CPT

## 2024-02-02 RX ORDER — INSULIN GLARGINE 100 [IU]/ML
15 INJECTION, SOLUTION SUBCUTANEOUS NIGHTLY
Status: DISCONTINUED | OUTPATIENT
Start: 2024-02-02 | End: 2024-02-03

## 2024-02-02 RX ORDER — INSULIN LISPRO 100 [IU]/ML
0-8 INJECTION, SOLUTION INTRAVENOUS; SUBCUTANEOUS
Status: DISCONTINUED | OUTPATIENT
Start: 2024-02-02 | End: 2024-02-05 | Stop reason: HOSPADM

## 2024-02-02 RX ORDER — INSULIN LISPRO 100 [IU]/ML
10 INJECTION, SOLUTION INTRAVENOUS; SUBCUTANEOUS
Status: DISCONTINUED | OUTPATIENT
Start: 2024-02-03 | End: 2024-02-05

## 2024-02-02 RX ORDER — INSULIN LISPRO 100 [IU]/ML
0-8 INJECTION, SOLUTION INTRAVENOUS; SUBCUTANEOUS
Status: DISCONTINUED | OUTPATIENT
Start: 2024-02-03 | End: 2024-02-05 | Stop reason: HOSPADM

## 2024-02-02 RX ADMIN — ATORVASTATIN CALCIUM 20 MG: 10 TABLET, FILM COATED ORAL at 09:06

## 2024-02-02 RX ADMIN — ASPIRIN 81 MG 81 MG: 81 TABLET ORAL at 22:21

## 2024-02-02 RX ADMIN — ATORVASTATIN CALCIUM 20 MG: 10 TABLET, FILM COATED ORAL at 22:21

## 2024-02-02 RX ADMIN — ACETAMINOPHEN 650 MG: 325 TABLET ORAL at 22:25

## 2024-02-02 RX ADMIN — CARVEDILOL 25 MG: 25 TABLET, FILM COATED ORAL at 09:05

## 2024-02-02 RX ADMIN — SODIUM CHLORIDE, PRESERVATIVE FREE 10 ML: 5 INJECTION INTRAVENOUS at 22:21

## 2024-02-02 RX ADMIN — INSULIN GLARGINE 15 UNITS: 100 INJECTION, SOLUTION SUBCUTANEOUS at 22:21

## 2024-02-02 RX ADMIN — ENOXAPARIN SODIUM 40 MG: 100 INJECTION SUBCUTANEOUS at 11:30

## 2024-02-02 RX ADMIN — CARVEDILOL 25 MG: 25 TABLET, FILM COATED ORAL at 22:21

## 2024-02-02 RX ADMIN — PANTOPRAZOLE SODIUM 40 MG: 40 TABLET, DELAYED RELEASE ORAL at 09:05

## 2024-02-02 RX ADMIN — INSULIN LISPRO 2 UNITS: 100 INJECTION, SOLUTION INTRAVENOUS; SUBCUTANEOUS at 22:34

## 2024-02-02 RX ADMIN — SODIUM CHLORIDE, PRESERVATIVE FREE 10 ML: 5 INJECTION INTRAVENOUS at 09:06

## 2024-02-02 RX ADMIN — LEVOTHYROXINE SODIUM 75 MCG: 75 TABLET ORAL at 11:31

## 2024-02-02 RX ADMIN — AMLODIPINE BESYLATE 10 MG: 10 TABLET ORAL at 09:05

## 2024-02-02 ASSESSMENT — PAIN DESCRIPTION - DESCRIPTORS: DESCRIPTORS: ACHING

## 2024-02-02 ASSESSMENT — PAIN DESCRIPTION - LOCATION: LOCATION: FOOT

## 2024-02-02 ASSESSMENT — PAIN SCALES - GENERAL: PAINLEVEL_OUTOF10: 6

## 2024-02-02 ASSESSMENT — PAIN DESCRIPTION - ORIENTATION: ORIENTATION: RIGHT;LEFT

## 2024-02-02 NOTE — ED NOTES
Small bm smear, pull up saturated with urine d/t suprapubic catheter leaking, groin folds excoriated and tender with cleansing dada care given x 2 staff and repositioned. Alert and oriented responds appropriately to questions. Report received from [facility] RN care assumed at this time.

## 2024-02-02 NOTE — H&P
History and Physical      Name:  Greg Easton /Age/Sex: 1940  (83 y.o. male)   MRN & CSN:  [id number] & [id number] Encounter Date/Time: 2024 7:17 PM   Location:  ED21/ED-21 PCP: Jacobo Zazueta MD       Hospital Day: 1    Assessment and Plan:     Patient is a 83-year-old male who presented with low sugar.     # T2DM with symptomatic hypoglycemia  - Endorsed low BG readings at home with generalized weakness. Has decreased oral intake, but continued to use regular dose of Insulin. Not on DOWNS. Last A1c 8.4% in 2023, repeat pending.   - BG 41 on arrival in ED, received D50.   - Continue BG q2h, consider D5 gtt if repeat hypoglycemia (decreased Insulin clearance in setting of PAMELLA).     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 Cr 2.4, baseline ~ 1.8. UA ordered.  - Held Losartan-HCTZ. Will challenge with IVF. Strict I/O's. Bladder scan if decreased voiding.    # Hyperkalemia  - Mild. ECG without acute changes.   - Lokelma x1, follow-up repeat labs.    # Essential hypretension  - Hold Losartan-HCTZ in setting of PAMELLA, continue Coreg and Norvasc.     # Repeated falls  - Endorsed generalized weakness and repeat falls at home. Was in IPR in 2023.  - Exam non-focal. CTH and c-spine non-acute.   - PT/OT consulted, appreciate assistance. Fall precautions.     # Acquired hypothyroidism  - Continue Synthroid.  - Follow-up repeat TSH.    # GERD  - Continue PPI.    # Anemia of chronic disease  - H/H at baseline.   - Anemia panel in 3/2023 consistent with AoCD.    # Prostate cancer s/p prostatectomy in   - Followed by Urology, on ADT every 3 months.     Checklist:  Advanced directive: full  Diet: cardiac  DVT ppx: Lovenox  Sugar: BG goal of 140-180 while inpatient    Disposition: admit to inpatient.  Estimated discharge: 2-3 day(s).  Current living situation: home.  Expected disposition: TBD.    Spoke with ED provider who recommended admission for the patient and I agree with that plan.  Personally

## 2024-02-02 NOTE — PROGRESS NOTES
V2.0  [facility] [place]      Name:  Greg Easton /Age/Sex: 1940  (83 y.o. male)   MRN & CSN:  [id number] & [id number] Encounter Date/Time: 2024 9:25 AM EST    Location:  ED21/ED-21 PCP: Jacobo Zazueta MD       Hospital Day: 2    Assessment and Plan:   Greg Easton is a 83 y.o. male with pmh of HTN, T2DM, CKD3, AoCD and prostate cancer s/p prostatectomy in  has chronic SPC  who presents with Hypoglycemia      Plan:  # T2DM with symptomatic hypoglycemia -improving  - Endorsed low BG readings at home with generalized weakness. Has decreased oral intake, but continued to use regular dose of Insulin. Not on DOWNS. Last A1c 8.4% in 2023, repeat 6.3   - BG 41 on arrival in ED, received D50.   - Continue BG q2h, consider D5 gtt if repeat hypoglycemia (decreased Insulin clearance in setting of PAMELLA).   - consult endocrinology     # PAMELLA on CKD3b  - Clinically hypovolemic. Cr 2.4, baseline ~ 1.8. UA ordered, pending.  - Held Losartan-HCTZ. Will challenge with IVF. Strict I/O's. Bladder scan if decreased voiding.     # Hyperkalemia - improving  - Mild. ECG without acute changes.   - Lokelma, follow-up repeat labs.     # Essential hypretension  - Hold Losartan-HCTZ in setting of PAMELLA, continue Coreg and Norvasc.      # Repeated falls  - Endorsed generalized weakness and repeat falls at home. Was in IPR in 2023.  - Exam non-focal. CTH and c-spine non-acute.   - PT/OT consulted, appreciate assistance. Fall precautions.      # Acquired hypothyroidism  - Continue Synthroid.  - Follow-up repeat TSH.     # GERD  - Continue PPI.     # Anemia of chronic disease  - H/H at baseline.   - Anemia panel in 3/2023 consistent with AoCD.     # Prostate cancer s/p prostatectomy in   - Followed by Urology, on ADT every 3 months.     Diet ADULT DIET; Regular   DVT Prophylaxis [x] Lovenox, []  Heparin, [] SCDs, [] Ambulation,  [] Eliquis, [] Xarelto  [] Coumadin   Code Status Full Code   Disposition

## 2024-02-02 NOTE — ED NOTES
ED TO INPATIENT SBAR HANDOFF    Patient Name: Greg Easton   :  1940  83 y.o.   Preferred Name  Greg  Family/Caregiver Present no   Restraints no   C-SSRS: Risk of Suicide: No Risk  Sitter no   Sepsis Risk Score Sepsis Risk Score: 1.52      Situation  Chief Complaint   Patient presents with    Hypoglycemia     BG 50 per EMS initially, given dextrose by EMS,     Urinary Catheter     Catheter clogged.      Brief Description of Patient's Condition:  83 y.o. male who presents with fall.     Patient states that when he woke up this morning his blood sugar was low in the 50s.  He went to go get cereal and then when he was putting away the heavy container of milk he started to feel off balance and fell backwards striking his head.  He did not lose consciousness.  He is complaining of a posterior headache, bilateral shoulder pain some mild neck pain.  He states that he is not taking any blood thinners.  No back pain.     Patient is a insulin-dependent diabetic.  Is on short acting and long-acting insulin.  He states he ate being soup around 6 PM yesterday and did not eat anything up until trying to eat this morning.  Medics did recheck blood sugar and it was low in the 50s and he was given glucose.  Patient states for the last 3 to 4 days his blood sugars have been in the 70s and 80s in the morning.     Patient denies any dysuria or frequency.  No abdominal pain.     Mental Status: oriented, alert, and coherent  Arrived from: home    Imaging:   CT HEAD WO CONTRAST   Final Result   1. No acute intracranial abnormality.         CT CERVICAL SPINE WO CONTRAST   Final Result   No acute abnormality of the cervical spine.      Stable postsurgical changes from C3 through C7.      Bilateral maxillary sinus disease incompletely visualized.         XR SHOULDER RIGHT (MIN 2 VIEWS)   Final Result   1. No acute abnormality.         XR SHOULDER LEFT (MIN 2 VIEWS)   Final Result   1. No acute abnormality.           Abnormal  failure) (HCC)     Hypotension     Immunodeficiency (HCC)     Metabolic encephalopathy     Muscle weakness     Osteoarthritis 01/01/2011    Osteoarthritis     Secondary Hyperaldosteronism (HCC)     Supraventricular tachycardia     Tubulo-interstitial nephritis        Assessment    Vitals:        Vitals:    02/01/24 1224 02/01/24 1805 02/02/24 0100 02/02/24 0145   BP: (!) 158/50 (!) 156/69 (!) 140/112    Pulse: 59 63 59 66   Resp: 17 16 12 17   Temp: 97.7 °F (36.5 °C)      SpO2: 99% 99% 97% 98%     PO Status: Regular  O2 Flow Rate: O2 Device: None (Room air)    Cardiac Rhythm: NSR  Last documented pain medication administered: n/a  NIH Score: NIH     Active LDA's:   Peripheral IV 02/01/24 Right Antecubital (Active)       Pertinent or High Risk Medications/Drips: no   If Yes, please provide details:   Blood Product Administration: no  If Yes, please provide details:     Recommendation    Incomplete orders no  Additional Comments: no   If any further questions, please call Sending RN at [facility] [phone]    Electronically signed by: Electronically signed by Tarah Fatima RN on 2/2/2024 at 1:55 AM

## 2024-02-02 NOTE — CARE COORDINATION
-ER-ED-21 -Visited pt with son at bedside. Mr Easton was sent here to ER today for an episode of Hypoglycemia-he is an insulin dependent diabetic.His son explains that he has private pay home care thru [place] -has an aid and can get expanded services. Pt wife is now  . There was no other needs realized for pt at this earlytime.

## 2024-02-03 LAB
ALBUMIN SERPL-MCNC: 3.3 GM/DL (ref 3.4–5)
ALP BLD-CCNC: 70 IU/L (ref 40–128)
ALT SERPL-CCNC: 12 U/L (ref 10–40)
ANION GAP SERPL CALCULATED.3IONS-SCNC: 9 MMOL/L (ref 7–16)
AST SERPL-CCNC: 16 IU/L (ref 15–37)
BACTERIA: ABNORMAL /HPF
BASOPHILS ABSOLUTE: 0 K/CU MM
BASOPHILS RELATIVE PERCENT: 0.2 % (ref 0–1)
BILIRUB SERPL-MCNC: 0.2 MG/DL (ref 0–1)
BILIRUBIN URINE: NEGATIVE MG/DL
BLOOD, URINE: ABNORMAL
BUN SERPL-MCNC: 39 MG/DL (ref 6–23)
CALCIUM SERPL-MCNC: 7.9 MG/DL (ref 8.3–10.6)
CHLORIDE BLD-SCNC: 107 MMOL/L (ref 99–110)
CLARITY: ABNORMAL
CO2: 22 MMOL/L (ref 21–32)
COLOR: YELLOW
CREAT SERPL-MCNC: 2.4 MG/DL (ref 0.9–1.3)
CREATININE URINE: 58.4 MG/DL (ref 39–259)
DIFFERENTIAL TYPE: ABNORMAL
EOSINOPHILS ABSOLUTE: 0.5 K/CU MM
EOSINOPHILS RELATIVE PERCENT: 7.9 % (ref 0–3)
GFR SERPL CREATININE-BSD FRML MDRD: 26 ML/MIN/1.73M2
GLUCOSE BLD-MCNC: 145 MG/DL (ref 70–99)
GLUCOSE BLD-MCNC: 150 MG/DL (ref 70–99)
GLUCOSE BLD-MCNC: 164 MG/DL (ref 70–99)
GLUCOSE BLD-MCNC: 187 MG/DL (ref 70–99)
GLUCOSE BLD-MCNC: 85 MG/DL (ref 70–99)
GLUCOSE SERPL-MCNC: 131 MG/DL (ref 70–99)
GLUCOSE, URINE: NEGATIVE MG/DL
HCT VFR BLD CALC: 24.3 % (ref 42–52)
HEMOGLOBIN: 7.4 GM/DL (ref 13.5–18)
IMMATURE NEUTROPHIL %: 0.3 % (ref 0–0.43)
KETONES, URINE: NEGATIVE MG/DL
LEUKOCYTE ESTERASE, URINE: ABNORMAL
LYMPHOCYTES ABSOLUTE: 1.3 K/CU MM
LYMPHOCYTES RELATIVE PERCENT: 20.6 % (ref 24–44)
MCH RBC QN AUTO: 30.7 PG (ref 27–31)
MCHC RBC AUTO-ENTMCNC: 30.5 % (ref 32–36)
MCV RBC AUTO: 100.8 FL (ref 78–100)
MONOCYTES ABSOLUTE: 0.6 K/CU MM
MONOCYTES RELATIVE PERCENT: 9.6 % (ref 0–4)
NITRITE URINE, QUANTITATIVE: POSITIVE
NUCLEATED RBC %: 0 %
PDW BLD-RTO: 15.6 % (ref 11.7–14.9)
PH, URINE: 8.5 (ref 5–8)
PLATELET # BLD: 196 K/CU MM (ref 140–440)
PMV BLD AUTO: 10.9 FL (ref 7.5–11.1)
POTASSIUM SERPL-SCNC: 5.4 MMOL/L (ref 3.5–5.1)
PROT/CREAT RATIO, UR: 1.3
PROTEIN UA: 30 MG/DL
RBC # BLD: 2.41 M/CU MM (ref 4.6–6.2)
RBC URINE: 2 /HPF (ref 0–3)
SEGMENTED NEUTROPHILS ABSOLUTE COUNT: 4 K/CU MM
SEGMENTED NEUTROPHILS RELATIVE PERCENT: 61.4 % (ref 36–66)
SODIUM BLD-SCNC: 138 MMOL/L (ref 135–145)
SPECIFIC GRAVITY UA: 1.01 (ref 1–1.03)
TOTAL IMMATURE NEUTOROPHIL: 0.02 K/CU MM
TOTAL NUCLEATED RBC: 0 K/CU MM
TOTAL PROTEIN: 5.6 GM/DL (ref 6.4–8.2)
TRICHOMONAS: ABNORMAL /HPF
URINE TOTAL PROTEIN: 76.7 MG/DL
UROBILINOGEN, URINE: 0.2 MG/DL (ref 0.2–1)
WBC # BLD: 6.5 K/CU MM (ref 4–10.5)
WBC UA: 8 /HPF (ref 0–2)

## 2024-02-03 PROCEDURE — 6370000000 HC RX 637 (ALT 250 FOR IP): Performed by: STUDENT IN AN ORGANIZED HEALTH CARE EDUCATION/TRAINING PROGRAM

## 2024-02-03 PROCEDURE — 85025 COMPLETE CBC W/AUTO DIFF WBC: CPT

## 2024-02-03 PROCEDURE — 2580000003 HC RX 258: Performed by: STUDENT IN AN ORGANIZED HEALTH CARE EDUCATION/TRAINING PROGRAM

## 2024-02-03 PROCEDURE — 81001 URINALYSIS AUTO W/SCOPE: CPT

## 2024-02-03 PROCEDURE — 6360000002 HC RX W HCPCS: Performed by: STUDENT IN AN ORGANIZED HEALTH CARE EDUCATION/TRAINING PROGRAM

## 2024-02-03 PROCEDURE — 84156 ASSAY OF PROTEIN URINE: CPT

## 2024-02-03 PROCEDURE — 80053 COMPREHEN METABOLIC PANEL: CPT

## 2024-02-03 PROCEDURE — 36415 COLL VENOUS BLD VENIPUNCTURE: CPT

## 2024-02-03 PROCEDURE — 82570 ASSAY OF URINE CREATININE: CPT

## 2024-02-03 PROCEDURE — 2140000000 HC CCU INTERMEDIATE R&B

## 2024-02-03 PROCEDURE — 6370000000 HC RX 637 (ALT 250 FOR IP): Performed by: INTERNAL MEDICINE

## 2024-02-03 PROCEDURE — 82962 GLUCOSE BLOOD TEST: CPT

## 2024-02-03 RX ORDER — SODIUM CHLORIDE 9 MG/ML
INJECTION, SOLUTION INTRAVENOUS CONTINUOUS
Status: DISPENSED | OUTPATIENT
Start: 2024-02-03 | End: 2024-02-04

## 2024-02-03 RX ORDER — OXYBUTYNIN CHLORIDE 5 MG/1
5 TABLET ORAL 3 TIMES DAILY
Status: DISCONTINUED | OUTPATIENT
Start: 2024-02-03 | End: 2024-02-05 | Stop reason: HOSPADM

## 2024-02-03 RX ORDER — INSULIN GLARGINE 100 [IU]/ML
10 INJECTION, SOLUTION SUBCUTANEOUS NIGHTLY
Status: DISCONTINUED | OUTPATIENT
Start: 2024-02-03 | End: 2024-02-03

## 2024-02-03 RX ORDER — ENOXAPARIN SODIUM 100 MG/ML
30 INJECTION SUBCUTANEOUS DAILY
Status: DISCONTINUED | OUTPATIENT
Start: 2024-02-04 | End: 2024-02-05 | Stop reason: HOSPADM

## 2024-02-03 RX ORDER — INSULIN GLARGINE 100 [IU]/ML
5 INJECTION, SOLUTION SUBCUTANEOUS ONCE
Status: COMPLETED | OUTPATIENT
Start: 2024-02-03 | End: 2024-02-03

## 2024-02-03 RX ORDER — ERGOCALCIFEROL 1.25 MG/1
50000 CAPSULE ORAL WEEKLY
Status: DISCONTINUED | OUTPATIENT
Start: 2024-02-03 | End: 2024-02-05 | Stop reason: HOSPADM

## 2024-02-03 RX ORDER — INSULIN GLARGINE 100 [IU]/ML
12 INJECTION, SOLUTION SUBCUTANEOUS NIGHTLY
Status: DISCONTINUED | OUTPATIENT
Start: 2024-02-03 | End: 2024-02-05 | Stop reason: HOSPADM

## 2024-02-03 RX ORDER — VITAMIN B COMPLEX
2000 TABLET ORAL DAILY
Status: DISCONTINUED | OUTPATIENT
Start: 2024-03-30 | End: 2024-02-05 | Stop reason: HOSPADM

## 2024-02-03 RX ADMIN — AMLODIPINE BESYLATE 10 MG: 10 TABLET ORAL at 08:26

## 2024-02-03 RX ADMIN — SODIUM ZIRCONIUM CYCLOSILICATE 10 G: 10 POWDER, FOR SUSPENSION ORAL at 21:28

## 2024-02-03 RX ADMIN — PANTOPRAZOLE SODIUM 40 MG: 40 TABLET, DELAYED RELEASE ORAL at 06:59

## 2024-02-03 RX ADMIN — ENOXAPARIN SODIUM 40 MG: 100 INJECTION SUBCUTANEOUS at 08:27

## 2024-02-03 RX ADMIN — OXYBUTYNIN CHLORIDE 5 MG: 5 TABLET ORAL at 21:28

## 2024-02-03 RX ADMIN — CEFTRIAXONE 1000 MG: 1 INJECTION, POWDER, FOR SOLUTION INTRAMUSCULAR; INTRAVENOUS at 23:30

## 2024-02-03 RX ADMIN — SODIUM CHLORIDE, PRESERVATIVE FREE 10 ML: 5 INJECTION INTRAVENOUS at 08:27

## 2024-02-03 RX ADMIN — CARVEDILOL 25 MG: 25 TABLET, FILM COATED ORAL at 08:26

## 2024-02-03 RX ADMIN — SENNOSIDES 17.2 MG: 8.6 TABLET, FILM COATED ORAL at 08:26

## 2024-02-03 RX ADMIN — SODIUM ZIRCONIUM CYCLOSILICATE 10 G: 10 POWDER, FOR SUSPENSION ORAL at 15:19

## 2024-02-03 RX ADMIN — SODIUM CHLORIDE: 9 INJECTION, SOLUTION INTRAVENOUS at 15:24

## 2024-02-03 RX ADMIN — LEVOTHYROXINE SODIUM 75 MCG: 75 TABLET ORAL at 06:59

## 2024-02-03 RX ADMIN — CARVEDILOL 25 MG: 25 TABLET, FILM COATED ORAL at 21:28

## 2024-02-03 RX ADMIN — ATORVASTATIN CALCIUM 20 MG: 10 TABLET, FILM COATED ORAL at 21:28

## 2024-02-03 RX ADMIN — ASPIRIN 81 MG 81 MG: 81 TABLET ORAL at 21:28

## 2024-02-03 RX ADMIN — ACETAMINOPHEN 650 MG: 325 TABLET ORAL at 21:28

## 2024-02-03 RX ADMIN — SODIUM CHLORIDE, PRESERVATIVE FREE 10 ML: 5 INJECTION INTRAVENOUS at 21:29

## 2024-02-03 RX ADMIN — ACETAMINOPHEN 650 MG: 325 TABLET ORAL at 08:29

## 2024-02-03 RX ADMIN — INSULIN GLARGINE 5 UNITS: 100 INJECTION, SOLUTION SUBCUTANEOUS at 23:27

## 2024-02-03 RX ADMIN — ERGOCALCIFEROL 50000 UNITS: 1.25 CAPSULE ORAL at 10:55

## 2024-02-03 ASSESSMENT — PAIN DESCRIPTION - ORIENTATION
ORIENTATION: LEFT;LOWER
ORIENTATION: MID;LOWER

## 2024-02-03 ASSESSMENT — PAIN SCALES - GENERAL
PAINLEVEL_OUTOF10: 4
PAINLEVEL_OUTOF10: 8

## 2024-02-03 ASSESSMENT — PAIN DESCRIPTION - DESCRIPTORS
DESCRIPTORS: SPASM;ACHING
DESCRIPTORS: ACHING

## 2024-02-03 ASSESSMENT — PAIN DESCRIPTION - LOCATION
LOCATION: ABDOMEN;HIP
LOCATION: FOOT;HIP;BACK

## 2024-02-03 NOTE — PROGRESS NOTES
V2.0  [facility] Hospitalist Progress Note      Name:  Greg Easton /Age/Sex: 1940  (83 y.o. male)   MRN & CSN:  [id number] & [id number] Encounter Date/Time: 2/3/2024 9:25 AM EST    Location:  [facility] PCP: Jacobo Zazueta MD       Hospital Day: 3    Subjective:   Chief Complaint: Hypoglycemia (BG 50 per EMS initially, given dextrose by EMS, ) and Urinary Catheter (Catheter clogged. )       Patient seen and evaluated at bedside.    Glucose was 85 but improved after breakfast 180's.  Was sleepy but awakens to questions. Apparently pt didn't get good sleep.  No new complaints or concerns.    Assessment and Plan:   T2DM with symptomatic hypoglycemia ; glucose 41 on arrival-improving   Endorsed low BG readings at home with generalized weakness. Has decreased oral intake, but continued to use regular dose of Insulin. Not on DOWNS. Last A1c 8.4% in 2023, repeat 6.3   - decrease home Lantus to 12U QHS  - hold lispro w meals  - continue MDSSI  - consulted endocrinology     PAMELLA on CKD3b w hyperkalemia  Clinically hypovolemic. Cr 2.4 from 2.3. , baseline ~ 2.0-2.2.   K still slightly high today 2.4.   UA showed protein 30.   Could be new baseline vs PAMELLA  - Held Losartan-HCTZ.   - give more IVF today  - lokelma  - check protein/cr ratio  - plan to consult nephro if no improvement  - Strict I/O's. Bladder scan if decreased voiding.     Hyperkalemia - improving  - Mild. ECG without acute changes.   - Lokelma, follow-up repeat labs.     Essential hypretension  - Hold Losartan-HCTZ in setting of PAMELLA, continue Coreg and Norvasc.      Repeated falls  - Endorsed generalized weakness and repeat falls at home. Was in IPR in 2023.  - Exam non-focal. CTH and c-spine non-acute.   - PT/OT consulted, appreciate assistance. Fall precautions.      Hypothyroidism. On home synthroid 75. Last TSH 3.07 2024. Continue home meds    GERD  - Continue PPI.     Anemia of chronic disease  - H/H at baseline 7-8.   - Anemia panel in    CT CERVICAL SPINE WO CONTRAST    Result Date: 2/1/2024  EXAMINATION: CT OF THE CERVICAL SPINE WITHOUT CONTRAST 2/1/2024 3:11 pm TECHNIQUE: CT of the cervical spine was performed without the administration of intravenous contrast. Multiplanar reformatted images are provided for review. Automated exposure control, iterative reconstruction, and/or weight based adjustment of the mA/kV was utilized to reduce the radiation dose to as low as reasonably achievable. COMPARISON: 11/16/2021 HISTORY: ORDERING SYSTEM PROVIDED HISTORY: fall TECHNOLOGIST PROVIDED HISTORY: Reason for exam:->fall Decision Support Exception - unselect if not a suspected or confirmed emergency medical condition->Emergency Medical Condition (MA) Reason for Exam: fall FINDINGS: BONES/ALIGNMENT: Stable postsurgical changes of a wide posterior laminectomy and fusion from C3 through C7.  Cervical fusion hardware is intact and in good position.  Cervical vertebral body heights and alignment are normal. There is unchanged straightening of the normal cervical lordosis.  Incomplete fusion of posterior arch of C1, a normal developmental variation.  There is no acute fracture or traumatic malalignment. DEGENERATIVE CHANGES: Multilevel degenerative disc disease from C3 through C7. SOFT TISSUES: There is no prevertebral soft tissue swelling.  The carotid arteries are tortuous nearly touching in the midline (axial image 34). Bilateral maxillary sinus disease incompletely visualized.     No acute abnormality of the cervical spine. Stable postsurgical changes from C3 through C7. Bilateral maxillary sinus disease incompletely visualized.     CT HEAD WO CONTRAST    Result Date: 2/1/2024  EXAMINATION: CT OF THE HEAD WITHOUT CONTRAST  2/1/2024 3:11 pm TECHNIQUE: CT of the head was performed without the administration of intravenous contrast. Automated exposure control, iterative reconstruction, and/or weight based adjustment of the mA/kV was utilized to reduce the

## 2024-02-03 NOTE — PROGRESS NOTES
Progress Note( Dr. Garcia)  2/3/2024  Subjective:   Admit Date: 2/1/2024  PCP: Jacobo Zazueta MD    Admitted For : Generalized weakness and hypoglycemia     Consulted For: Better control of blood glucose     Interval History: Patient still feels tired has lower blood glucose below 100 this morning  Hemoglobin dropped to 7.4  Serum potassium was 5.4 and serum creatinine remains about 2.4    Denies any chest pains,   Denies SOB .   Denies nausea or vomiting.   No new bowel or bladder symptoms.       Intake/Output Summary (Last 24 hours) at 2/3/2024 1235  Last data filed at 2/2/2024 1842  Gross per 24 hour   Intake --   Output 1000 ml   Net -1000 ml       DATA    CBC:   Recent Labs     02/01/24  1500 02/02/24  0255 02/03/24  1014   WBC 8.3 6.4 6.5   HGB 7.9* 7.5* 7.4*    216 196    CMP:  Recent Labs     02/01/24  1719 02/02/24  0255 02/03/24  1014    138 138   K 5.7* 5.2* 5.4*    107 107   CO2 24 24 22   BUN 46* 43* 39*   CREATININE 2.4* 2.3* 2.4*   CALCIUM 8.8 8.5 7.9*   PROT 7.8 6.1* 5.6*   LABALBU 3.9 3.5 3.3*   BILITOT 0.3 0.2 0.2   ALKPHOS 79 75 70   AST 22 15 16   ALT 15 12 12     Lipids:   Lab Results   Component Value Date/Time    CHOL 171 05/30/2023 06:48 AM    HDL 43 05/30/2023 06:48 AM    TRIG 129 05/30/2023 06:48 AM     Glucose:  Recent Labs     02/03/24  0146 02/03/24  0809 02/03/24  1053   POCGLU 145* 85 187*     XldydrpyqmW3I:  Lab Results   Component Value Date/Time    LABA1C 6.3 02/02/2024 02:55 AM     High Sensitivity TSH:   Lab Results   Component Value Date/Time    TSHHS 3.070 02/02/2024 02:55 AM     Free T3: No results found for: \"FT3\"  Free T4:  Lab Results   Component Value Date/Time    T4FREE 1.55 05/24/2023 01:26 AM       CT HEAD WO CONTRAST   Final Result   1. No acute intracranial abnormality.         CT CERVICAL SPINE WO CONTRAST   Final Result   No acute abnormality of the cervical spine.      Stable postsurgical changes from C3 through C7.      Bilateral maxillary

## 2024-02-03 NOTE — CONSULTS
Endocrinology   Consult Note  2/1/2024 12:19 PM     Primary Care provider: Jacobo Zazueta MD     Referring physician:  Iam Reddy MD     Dear Doctor Jose R Harris     Thank You for the Consult     Pt. Was Admitted for : Generalized weakness and hypoglycemia    Reason for Consult: Better control of blood glucose      History Obtained From:  Patient/ EMR       HISTORY OF PRESENT ILLNESS:                The patient is a 83 y.o. male with significant past medical history of diabetes mellitus, CKD, hypertension, hyperlipidemia hypothyroidism anemia GERD unstable gait congestive heart failure history of prostate cancer, had bladder surgery, had multiple cystoscopies admitted to hospital with altered mental state generalized weakness and found to be hypoglycemia as patient took his usual dose of Lantus insulin but did not eat enough food resulting in hypoglycemia.  I was  consulted for better control of blood glucose.       ROS:   Pt's ROS done in detail.  Abnormal ROS are noted in Medical and Surgical History Section below:     Other Medical History:        Diagnosis Date    Acute kidney failure (HCC)     Acute urinary tract infection 03/15/2012    Anemia     Ataxia 01/01/2010    Ataxia     Bacteremia     Candidiasis     Chronic combined systolic and diastolic congestive heart failure (HCC)     Chronic kidney disease     Cognitive communication deficit     Diabetes mellitus (HCC) 01/01/2011    type 2, controlled    Extended spectrum beta lactamase (ESBL) resistance     Fusion of spine of cervical region 10/01/2012    Gait disturbance 01/01/2011    History of prostate cancer 01/01/1996    adenocarcinoma    History of tobacco use 01/01/1959    Hydronephrosis     Hyperkalemia     Hyperlipidemia 01/01/2011    Hypertension     Hypertensive heart disease with CHF (congestive heart failure) (HCC)     Hypotension     Immunodeficiency (HCC)     Metabolic encephalopathy     Muscle weakness     Osteoarthritis 01/01/2011

## 2024-02-04 ENCOUNTER — APPOINTMENT (OUTPATIENT)
Dept: GENERAL RADIOLOGY | Age: 84
DRG: 639 | End: 2024-02-04
Payer: MEDICARE

## 2024-02-04 LAB
ALBUMIN SERPL-MCNC: 3.1 GM/DL (ref 3.4–5)
ALP BLD-CCNC: 74 IU/L (ref 40–128)
ALT SERPL-CCNC: 11 U/L (ref 10–40)
ANION GAP SERPL CALCULATED.3IONS-SCNC: 10 MMOL/L (ref 7–16)
AST SERPL-CCNC: 13 IU/L (ref 15–37)
BASOPHILS ABSOLUTE: 0 K/CU MM
BASOPHILS RELATIVE PERCENT: 0.3 % (ref 0–1)
BILIRUB SERPL-MCNC: 0.2 MG/DL (ref 0–1)
BUN SERPL-MCNC: 38 MG/DL (ref 6–23)
CALCIUM SERPL-MCNC: 7.7 MG/DL (ref 8.3–10.6)
CHLORIDE BLD-SCNC: 107 MMOL/L (ref 99–110)
CO2: 20 MMOL/L (ref 21–32)
CREAT SERPL-MCNC: 2.3 MG/DL (ref 0.9–1.3)
DIFFERENTIAL TYPE: ABNORMAL
EOSINOPHILS ABSOLUTE: 0.5 K/CU MM
EOSINOPHILS RELATIVE PERCENT: 8.2 % (ref 0–3)
GFR SERPL CREATININE-BSD FRML MDRD: 27 ML/MIN/1.73M2
GLUCOSE BLD-MCNC: 170 MG/DL (ref 70–99)
GLUCOSE BLD-MCNC: 172 MG/DL (ref 70–99)
GLUCOSE BLD-MCNC: 208 MG/DL (ref 70–99)
GLUCOSE BLD-MCNC: 232 MG/DL (ref 70–99)
GLUCOSE BLD-MCNC: 268 MG/DL (ref 70–99)
GLUCOSE BLD-MCNC: 48 MG/DL (ref 70–99)
GLUCOSE BLD-MCNC: 51 MG/DL (ref 70–99)
GLUCOSE BLD-MCNC: 79 MG/DL (ref 70–99)
GLUCOSE SERPL-MCNC: 225 MG/DL (ref 70–99)
HCT VFR BLD CALC: 22.8 % (ref 42–52)
HCT VFR BLD CALC: 30.7 % (ref 42–52)
HEMOGLOBIN: 6.7 GM/DL (ref 13.5–18)
HEMOGLOBIN: 9.3 GM/DL (ref 13.5–18)
IMMATURE NEUTROPHIL %: 0.2 % (ref 0–0.43)
IRON: 30 UG/DL (ref 59–158)
LYMPHOCYTES ABSOLUTE: 1.6 K/CU MM
LYMPHOCYTES RELATIVE PERCENT: 25.9 % (ref 24–44)
MCH RBC QN AUTO: 29.6 PG (ref 27–31)
MCHC RBC AUTO-ENTMCNC: 29.4 % (ref 32–36)
MCV RBC AUTO: 100.9 FL (ref 78–100)
MONOCYTES ABSOLUTE: 0.6 K/CU MM
MONOCYTES RELATIVE PERCENT: 9 % (ref 0–4)
NUCLEATED RBC %: 0 %
PCT TRANSFERRIN: 14 % (ref 10–44)
PDW BLD-RTO: 15.5 % (ref 11.7–14.9)
PLATELET # BLD: 191 K/CU MM (ref 140–440)
PMV BLD AUTO: 10.8 FL (ref 7.5–11.1)
POTASSIUM SERPL-SCNC: 5 MMOL/L (ref 3.5–5.1)
RBC # BLD: 2.26 M/CU MM (ref 4.6–6.2)
SEGMENTED NEUTROPHILS ABSOLUTE COUNT: 3.5 K/CU MM
SEGMENTED NEUTROPHILS RELATIVE PERCENT: 56.4 % (ref 36–66)
SODIUM BLD-SCNC: 137 MMOL/L (ref 135–145)
TOTAL IMMATURE NEUTOROPHIL: 0.01 K/CU MM
TOTAL IRON BINDING CAPACITY: 209 UG/DL (ref 250–450)
TOTAL NUCLEATED RBC: 0 K/CU MM
TOTAL PROTEIN: 5.5 GM/DL (ref 6.4–8.2)
UNSATURATED IRON BINDING CAPACITY: 179 UG/DL (ref 110–370)
WBC # BLD: 6.1 K/CU MM (ref 4–10.5)

## 2024-02-04 PROCEDURE — P9016 RBC LEUKOCYTES REDUCED: HCPCS

## 2024-02-04 PROCEDURE — 85014 HEMATOCRIT: CPT

## 2024-02-04 PROCEDURE — 36430 TRANSFUSION BLD/BLD COMPNT: CPT

## 2024-02-04 PROCEDURE — 2580000003 HC RX 258: Performed by: STUDENT IN AN ORGANIZED HEALTH CARE EDUCATION/TRAINING PROGRAM

## 2024-02-04 PROCEDURE — 2140000000 HC CCU INTERMEDIATE R&B

## 2024-02-04 PROCEDURE — 6370000000 HC RX 637 (ALT 250 FOR IP): Performed by: INTERNAL MEDICINE

## 2024-02-04 PROCEDURE — 6370000000 HC RX 637 (ALT 250 FOR IP): Performed by: STUDENT IN AN ORGANIZED HEALTH CARE EDUCATION/TRAINING PROGRAM

## 2024-02-04 PROCEDURE — 83550 IRON BINDING TEST: CPT

## 2024-02-04 PROCEDURE — 73502 X-RAY EXAM HIP UNI 2-3 VIEWS: CPT

## 2024-02-04 PROCEDURE — 86850 RBC ANTIBODY SCREEN: CPT

## 2024-02-04 PROCEDURE — 80053 COMPREHEN METABOLIC PANEL: CPT

## 2024-02-04 PROCEDURE — 6360000002 HC RX W HCPCS: Performed by: STUDENT IN AN ORGANIZED HEALTH CARE EDUCATION/TRAINING PROGRAM

## 2024-02-04 PROCEDURE — 36415 COLL VENOUS BLD VENIPUNCTURE: CPT

## 2024-02-04 PROCEDURE — 86900 BLOOD TYPING SEROLOGIC ABO: CPT

## 2024-02-04 PROCEDURE — 82962 GLUCOSE BLOOD TEST: CPT

## 2024-02-04 PROCEDURE — 82728 ASSAY OF FERRITIN: CPT

## 2024-02-04 PROCEDURE — 94761 N-INVAS EAR/PLS OXIMETRY MLT: CPT

## 2024-02-04 PROCEDURE — 85018 HEMOGLOBIN: CPT

## 2024-02-04 PROCEDURE — 86922 COMPATIBILITY TEST ANTIGLOB: CPT

## 2024-02-04 PROCEDURE — 85025 COMPLETE CBC W/AUTO DIFF WBC: CPT

## 2024-02-04 PROCEDURE — 83540 ASSAY OF IRON: CPT

## 2024-02-04 PROCEDURE — 86901 BLOOD TYPING SEROLOGIC RH(D): CPT

## 2024-02-04 RX ORDER — HYDRALAZINE HYDROCHLORIDE 25 MG/1
25 TABLET, FILM COATED ORAL EVERY 8 HOURS SCHEDULED
Status: DISCONTINUED | OUTPATIENT
Start: 2024-02-04 | End: 2024-02-05

## 2024-02-04 RX ORDER — SODIUM CHLORIDE 9 MG/ML
INJECTION, SOLUTION INTRAVENOUS PRN
Status: DISCONTINUED | OUTPATIENT
Start: 2024-02-04 | End: 2024-02-04

## 2024-02-04 RX ORDER — SODIUM CHLORIDE 9 MG/ML
INJECTION, SOLUTION INTRAVENOUS PRN
Status: DISCONTINUED | OUTPATIENT
Start: 2024-02-04 | End: 2024-02-05 | Stop reason: HOSPADM

## 2024-02-04 RX ADMIN — HYDRALAZINE HYDROCHLORIDE 25 MG: 25 TABLET ORAL at 21:07

## 2024-02-04 RX ADMIN — CARVEDILOL 25 MG: 25 TABLET, FILM COATED ORAL at 21:06

## 2024-02-04 RX ADMIN — PANTOPRAZOLE SODIUM 40 MG: 40 TABLET, DELAYED RELEASE ORAL at 06:08

## 2024-02-04 RX ADMIN — SODIUM CHLORIDE, PRESERVATIVE FREE 10 ML: 5 INJECTION INTRAVENOUS at 07:59

## 2024-02-04 RX ADMIN — OXYBUTYNIN CHLORIDE 5 MG: 5 TABLET ORAL at 21:07

## 2024-02-04 RX ADMIN — Medication 16 G: at 07:00

## 2024-02-04 RX ADMIN — AMLODIPINE BESYLATE 10 MG: 10 TABLET ORAL at 07:59

## 2024-02-04 RX ADMIN — ACETAMINOPHEN 650 MG: 325 TABLET ORAL at 21:06

## 2024-02-04 RX ADMIN — SODIUM ZIRCONIUM CYCLOSILICATE 10 G: 10 POWDER, FOR SUSPENSION ORAL at 13:48

## 2024-02-04 RX ADMIN — CARVEDILOL 25 MG: 25 TABLET, FILM COATED ORAL at 07:59

## 2024-02-04 RX ADMIN — OXYBUTYNIN CHLORIDE 5 MG: 5 TABLET ORAL at 13:48

## 2024-02-04 RX ADMIN — SODIUM ZIRCONIUM CYCLOSILICATE 10 G: 10 POWDER, FOR SUSPENSION ORAL at 08:00

## 2024-02-04 RX ADMIN — INSULIN LISPRO 4 UNITS: 100 INJECTION, SOLUTION INTRAVENOUS; SUBCUTANEOUS at 01:35

## 2024-02-04 RX ADMIN — SENNOSIDES 17.2 MG: 8.6 TABLET, FILM COATED ORAL at 07:59

## 2024-02-04 RX ADMIN — ASPIRIN 81 MG 81 MG: 81 TABLET ORAL at 21:07

## 2024-02-04 RX ADMIN — SODIUM CHLORIDE, PRESERVATIVE FREE 10 ML: 5 INJECTION INTRAVENOUS at 21:07

## 2024-02-04 RX ADMIN — LEVOTHYROXINE SODIUM 75 MCG: 75 TABLET ORAL at 06:08

## 2024-02-04 RX ADMIN — INSULIN LISPRO 2 UNITS: 100 INJECTION, SOLUTION INTRAVENOUS; SUBCUTANEOUS at 21:07

## 2024-02-04 RX ADMIN — ENOXAPARIN SODIUM 30 MG: 100 INJECTION SUBCUTANEOUS at 08:00

## 2024-02-04 RX ADMIN — HYDRALAZINE HYDROCHLORIDE 25 MG: 25 TABLET ORAL at 12:51

## 2024-02-04 RX ADMIN — OXYBUTYNIN CHLORIDE 5 MG: 5 TABLET ORAL at 07:59

## 2024-02-04 RX ADMIN — INSULIN LISPRO 2 UNITS: 100 INJECTION, SOLUTION INTRAVENOUS; SUBCUTANEOUS at 17:50

## 2024-02-04 RX ADMIN — ATORVASTATIN CALCIUM 20 MG: 10 TABLET, FILM COATED ORAL at 21:07

## 2024-02-04 ASSESSMENT — PAIN SCALES - GENERAL: PAINLEVEL_OUTOF10: 3

## 2024-02-04 ASSESSMENT — PAIN DESCRIPTION - ORIENTATION: ORIENTATION: MID;RIGHT

## 2024-02-04 ASSESSMENT — PAIN - FUNCTIONAL ASSESSMENT: PAIN_FUNCTIONAL_ASSESSMENT: ACTIVITIES ARE NOT PREVENTED

## 2024-02-04 ASSESSMENT — PAIN DESCRIPTION - DESCRIPTORS: DESCRIPTORS: ACHING

## 2024-02-04 ASSESSMENT — PAIN DESCRIPTION - PAIN TYPE: TYPE: ACUTE PAIN

## 2024-02-04 ASSESSMENT — PAIN DESCRIPTION - LOCATION: LOCATION: HIP

## 2024-02-04 NOTE — PROGRESS NOTES
Progress Note( Dr. Garcia)  2/4/2024  Subjective:   Admit Date: 2/1/2024  PCP: Jacobo Zazueta MD    Admitted For : Generalized weakness and hypoglycemia     Consulted For: Better control of blood glucose     Interval History: Patient still feels tired has lower blood glucose below 100 this morning  Hemoglobin dropped to 6.7 he was started on 1 unit of packed red cell    Blood glucose dropped again even with the 5 units of Lantus insulin when blood glucose was 164  He received his insulin very late night    Serum potassium was 5.0 and serum creatinine remains about 2.3    Denies any chest pains,   Denies SOB .   Denies nausea or vomiting.   No new bowel or bladder symptoms.       Intake/Output Summary (Last 24 hours) at 2/4/2024 1134  Last data filed at 2/4/2024 1124  Gross per 24 hour   Intake 191 ml   Output 1050 ml   Net -859 ml         DATA    CBC:   Recent Labs     02/02/24  0255 02/03/24  1014 02/04/24  0052   WBC 6.4 6.5 6.1   HGB 7.5* 7.4* 6.7*    196 191      CMP:  Recent Labs     02/02/24  0255 02/03/24  1014 02/04/24  0052    138 137   K 5.2* 5.4* 5.0    107 107   CO2 24 22 20*   BUN 43* 39* 38*   CREATININE 2.3* 2.4* 2.3*   CALCIUM 8.5 7.9* 7.7*   PROT 6.1* 5.6* 5.5*   LABALBU 3.5 3.3* 3.1*   BILITOT 0.2 0.2 0.2   ALKPHOS 75 70 74   AST 15 16 13*   ALT 12 12 11       Lipids:   Lab Results   Component Value Date/Time    CHOL 171 05/30/2023 06:48 AM    HDL 43 05/30/2023 06:48 AM    TRIG 129 05/30/2023 06:48 AM     Glucose:  Recent Labs     02/04/24  0702 02/04/24  0740 02/04/24  0818   POCGLU 51* 79 172*       ZesrgxwrinS2K:  Lab Results   Component Value Date/Time    LABA1C 6.3 02/02/2024 02:55 AM     High Sensitivity TSH:   Lab Results   Component Value Date/Time    TSHHS 3.070 02/02/2024 02:55 AM     Free T3: No results found for: \"FT3\"  Free T4:  Lab Results   Component Value Date/Time    T4FREE 1.55 05/24/2023 01:26 AM       CT HEAD WO CONTRAST   Final Result   1. No acute  intracranial abnormality.         CT CERVICAL SPINE WO CONTRAST   Final Result   No acute abnormality of the cervical spine.      Stable postsurgical changes from C3 through C7.      Bilateral maxillary sinus disease incompletely visualized.         XR SHOULDER RIGHT (MIN 2 VIEWS)   Final Result   1. No acute abnormality.         XR SHOULDER LEFT (MIN 2 VIEWS)   Final Result   1. No acute abnormality.         XR HIP LEFT (2-3 VIEWS)    (Results Pending)        Scheduled Medicines   Medications:    hydrALAZINE  25 mg Oral 3 times per day    vitamin D  50,000 Units Oral Weekly    Followed by    [START ON 3/30/2024] Vitamin D  2,000 Units Oral Daily    enoxaparin  30 mg SubCUTAneous Daily    [Held by provider] insulin glargine  12 Units SubCUTAneous Nightly    sodium zirconium cyclosilicate  10 g Oral TID    oxyBUTYnin  5 mg Oral TID    [Held by provider] insulin lispro  10 Units SubCUTAneous TID WC    insulin lispro  0-8 Units SubCUTAneous TID WC    insulin lispro  0-8 Units SubCUTAneous 2 times per day    amLODIPine  10 mg Oral Daily    aspirin  81 mg Oral Nightly    atorvastatin  20 mg Oral Nightly    carvedilol  25 mg Oral BID    levothyroxine  75 mcg Oral Daily    pantoprazole  40 mg Oral QAM AC    senna  2 tablet Oral Daily    sodium chloride flush  5-40 mL IntraVENous 2 times per day      Infusions:    sodium chloride      sodium chloride      dextrose           Objective:   Vitals: BP (!) 179/59   Pulse 61   Temp 97.5 °F (36.4 °C) (Oral)   Resp 21   Wt 86.9 kg (191 lb 9.3 oz)   SpO2 100%   BMI 29.13 kg/m²   General appearance: alert and cooperative with exam  Neck: no JVD or bruit  Thyroid : Normal lobes   Lungs: Has Vesicular Breath sounds   Heart:  regular rate and rhythm  Abdomen: soft, non-tender; bowel sounds normal; no masses,  no organomegaly  Musculoskeletal: Normal  Extremities: extremities normal, , no edema  Neurologic:  Awake, alert, oriented to name, place and time.  Cranial nerves II-XII are

## 2024-02-04 NOTE — PROGRESS NOTES
V2.0  [facility] Hospitalist Progress Note      Name:  Greg Easton /Age/Sex: 1940  (83 y.o. male)   MRN & CSN:  [id number] & [id number] Encounter Date/Time: 2024 9:25 AM EST    Location:  [facility]/3116-A PCP: Jacobo Zazueta MD       Hospital Day: 4    Subjective:   Chief Complaint: Hypoglycemia (BG 50 per EMS initially, given dextrose by EMS, ) and Urinary Catheter (Catheter clogged. )     Pt was complaining of urinary spasms, apparently chronic problem for > 5 years.  This AM, glucose was in the 40's despite lower Lantus dose. Improved to 172 after replacement.  Hgb was 6.7 no sign of GI bleed. Patient seen and evaluated at bedside.    Complains of left hip pain since the fall.   Pt repots he had 1-2 dark stool episodes few days ago. No SOB. No other complaints or concerns.    Assessment and Plan:   T2DM with symptomatic hypoglycemia ; glucose 41 on arrival-improving   Endorsed low BG readings at home with generalized weakness. Has decreased oral intake, but continued to use regular dose of Insulin. Not on DOWNS. 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 Cr 2.4 from 2.3. , baseline ~ 2.0-2.2.   Today 2.3.   K improving today 5.0  Prot/cr ration 1.3.   Could be new baseline vs PAMELLA  Received IVF and lokelma. No sign of volume overload.   - continue to hold Losartan-HCTZ.   - plan to consult nephro if no improvement  - Strict I/O's.      Essential hypretension  BP has been rising today 170's.   - continue to hold Losartan-HCTZ in setting of PAMELLA  - continue Coreg and Norvasc.   - add hydralazine 25 mg TID     Repeated falls   Endorsed generalized weakness and repeat falls at home. Was in IPR in 2023.   Exam non-focal. CTH and c-spine non-acute.    PT/OT consulted, appreciate assistance. Fall precautions.  degrades image quality.  There is no acute intracerebral hemorrhage or extra-axial fluid collection.  There is mild cerebral atrophy with mild periventricular, subcortical and deep white matter small vessel ischemic disease. ORBITS: Status post bilateral cataract removal. SINUSES: Moderate mucosal hypertrophy involves the left maxillary sinus. There is also near complete opacification of the right maxillary sinus with inspissated mucus. SOFT TISSUES/SKULL:  The calvarium is intact.     1. No acute intracranial abnormality.     XR SHOULDER RIGHT (MIN 2 VIEWS)    Result Date: 2/1/2024  EXAMINATION: TWO XRAY VIEWS OF THE RIGHT SHOULDER;   XRAY VIEWS OF THE LEFT SHOULDER 2/1/2024 2:27 pm COMPARISON: None. HISTORY: ORDERING SYSTEM PROVIDED HISTORY: fall TECHNOLOGIST PROVIDED HISTORY: Reason for exam:->fall Reason for Exam: fall FINDINGS: There is no acute fracture or dislocation.  The bones are demineralized. There are no bony destructive lesions.  Mild degenerative changes involve the bilateral acromioclavicular and glenohumeral joints.  Status post posterior decompression stabilization of the cervical spine.     1. No acute abnormality.     XR SHOULDER LEFT (MIN 2 VIEWS)    Result Date: 2/1/2024  EXAMINATION: TWO XRAY VIEWS OF THE RIGHT SHOULDER;   XRAY VIEWS OF THE LEFT SHOULDER 2/1/2024 2:27 pm COMPARISON: None. HISTORY: ORDERING SYSTEM PROVIDED HISTORY: fall TECHNOLOGIST PROVIDED HISTORY: Reason for exam:->fall Reason for Exam: fall FINDINGS: There is no acute fracture or dislocation.  The bones are demineralized. There are no bony destructive lesions.  Mild degenerative changes involve the bilateral acromioclavicular and glenohumeral joints.  Status post posterior decompression stabilization of the cervical spine.     1. No acute abnormality.       Electronically signed by Lc De Paz MD on 2/4/2024 at 10:18 AM

## 2024-02-04 NOTE — PROGRESS NOTES
RN discussed with the patient the rationale for blood component transfusion; its benefits in treating or preventing fatigue, organ damage, or death; and its risk which includes mild transfusion reactions, rare risk of blood borne infection, or more serious but rare reactions. RN discussed the alternatives to transfusion, including the risk and consequences of not receiving transfusion. The patient had an opportunity to ask questions and had agreed to proceed with transfusion of blood components.

## 2024-02-05 VITALS
WEIGHT: 191 LBS | OXYGEN SATURATION: 98 % | TEMPERATURE: 98.6 F | HEIGHT: 68 IN | HEART RATE: 73 BPM | BODY MASS INDEX: 28.95 KG/M2 | SYSTOLIC BLOOD PRESSURE: 146 MMHG | DIASTOLIC BLOOD PRESSURE: 101 MMHG | RESPIRATION RATE: 17 BRPM

## 2024-02-05 LAB
ABO/RH: NORMAL
ALBUMIN SERPL-MCNC: 3.5 GM/DL (ref 3.4–5)
ALP BLD-CCNC: 86 IU/L (ref 40–128)
ALT SERPL-CCNC: 11 U/L (ref 10–40)
ANION GAP SERPL CALCULATED.3IONS-SCNC: 12 MMOL/L (ref 7–16)
ANTIBODY SCREEN: NEGATIVE
AST SERPL-CCNC: 14 IU/L (ref 15–37)
BASOPHILS ABSOLUTE: 0 K/CU MM
BASOPHILS RELATIVE PERCENT: 0.4 % (ref 0–1)
BILIRUB SERPL-MCNC: 0.2 MG/DL (ref 0–1)
BUN SERPL-MCNC: 32 MG/DL (ref 6–23)
CALCIUM SERPL-MCNC: 8.2 MG/DL (ref 8.3–10.6)
CHLORIDE BLD-SCNC: 104 MMOL/L (ref 99–110)
CO2: 22 MMOL/L (ref 21–32)
COMPONENT: NORMAL
CREAT SERPL-MCNC: 2.1 MG/DL (ref 0.9–1.3)
CROSSMATCH RESULT: NORMAL
CRP SERPL HS-MCNC: 24 MG/L
DIFFERENTIAL TYPE: ABNORMAL
EOSINOPHILS ABSOLUTE: 0.6 K/CU MM
EOSINOPHILS RELATIVE PERCENT: 7.6 % (ref 0–3)
ERYTHROCYTE SEDIMENTATION RATE: 78 MM/HR (ref 0–20)
FERRITIN: 350 NG/ML (ref 30–400)
GFR SERPL CREATININE-BSD FRML MDRD: 30 ML/MIN/1.73M2
GLUCOSE BLD-MCNC: 123 MG/DL (ref 70–99)
GLUCOSE BLD-MCNC: 169 MG/DL (ref 70–99)
GLUCOSE BLD-MCNC: 174 MG/DL (ref 70–99)
GLUCOSE BLD-MCNC: 183 MG/DL (ref 70–99)
GLUCOSE SERPL-MCNC: 193 MG/DL (ref 70–99)
HCT VFR BLD CALC: 29.5 % (ref 42–52)
HEMOGLOBIN: 9 GM/DL (ref 13.5–18)
IMMATURE NEUTROPHIL %: 0.1 % (ref 0–0.43)
LYMPHOCYTES ABSOLUTE: 1.1 K/CU MM
LYMPHOCYTES RELATIVE PERCENT: 15.7 % (ref 24–44)
MCH RBC QN AUTO: 29.5 PG (ref 27–31)
MCHC RBC AUTO-ENTMCNC: 30.5 % (ref 32–36)
MCV RBC AUTO: 96.7 FL (ref 78–100)
MONOCYTES ABSOLUTE: 0.7 K/CU MM
MONOCYTES RELATIVE PERCENT: 9.9 % (ref 0–4)
NUCLEATED RBC %: 0 %
PDW BLD-RTO: 16 % (ref 11.7–14.9)
PLATELET # BLD: 215 K/CU MM (ref 140–440)
PMV BLD AUTO: 11.1 FL (ref 7.5–11.1)
POTASSIUM SERPL-SCNC: 4.7 MMOL/L (ref 3.5–5.1)
PROCALCITONIN SERPL-MCNC: 0.28 NG/ML
RBC # BLD: 3.05 M/CU MM (ref 4.6–6.2)
SEGMENTED NEUTROPHILS ABSOLUTE COUNT: 4.8 K/CU MM
SEGMENTED NEUTROPHILS RELATIVE PERCENT: 66.3 % (ref 36–66)
SODIUM BLD-SCNC: 138 MMOL/L (ref 135–145)
STATUS: NORMAL
TOTAL IMMATURE NEUTOROPHIL: 0.01 K/CU MM
TOTAL NUCLEATED RBC: 0 K/CU MM
TOTAL PROTEIN: 6.1 GM/DL (ref 6.4–8.2)
TRANSFUSION STATUS: NORMAL
UNIT DIVISION: 0
UNIT NUMBER: NORMAL
WBC # BLD: 7.3 K/CU MM (ref 4–10.5)

## 2024-02-05 PROCEDURE — 6360000002 HC RX W HCPCS: Performed by: STUDENT IN AN ORGANIZED HEALTH CARE EDUCATION/TRAINING PROGRAM

## 2024-02-05 PROCEDURE — 80053 COMPREHEN METABOLIC PANEL: CPT

## 2024-02-05 PROCEDURE — 6370000000 HC RX 637 (ALT 250 FOR IP): Performed by: STUDENT IN AN ORGANIZED HEALTH CARE EDUCATION/TRAINING PROGRAM

## 2024-02-05 PROCEDURE — 97166 OT EVAL MOD COMPLEX 45 MIN: CPT

## 2024-02-05 PROCEDURE — 94761 N-INVAS EAR/PLS OXIMETRY MLT: CPT

## 2024-02-05 PROCEDURE — 82962 GLUCOSE BLOOD TEST: CPT

## 2024-02-05 PROCEDURE — 85652 RBC SED RATE AUTOMATED: CPT

## 2024-02-05 PROCEDURE — 36415 COLL VENOUS BLD VENIPUNCTURE: CPT

## 2024-02-05 PROCEDURE — 85025 COMPLETE CBC W/AUTO DIFF WBC: CPT

## 2024-02-05 PROCEDURE — 97116 GAIT TRAINING THERAPY: CPT

## 2024-02-05 PROCEDURE — 97535 SELF CARE MNGMENT TRAINING: CPT

## 2024-02-05 PROCEDURE — 86140 C-REACTIVE PROTEIN: CPT

## 2024-02-05 PROCEDURE — 2580000003 HC RX 258: Performed by: STUDENT IN AN ORGANIZED HEALTH CARE EDUCATION/TRAINING PROGRAM

## 2024-02-05 PROCEDURE — 97530 THERAPEUTIC ACTIVITIES: CPT

## 2024-02-05 PROCEDURE — 82728 ASSAY OF FERRITIN: CPT

## 2024-02-05 PROCEDURE — 84145 PROCALCITONIN (PCT): CPT

## 2024-02-05 PROCEDURE — 97112 NEUROMUSCULAR REEDUCATION: CPT

## 2024-02-05 RX ORDER — INSULIN LISPRO 100 [IU]/ML
5 INJECTION, SOLUTION INTRAVENOUS; SUBCUTANEOUS
Qty: 10 ML | Refills: 2 | Status: SHIPPED | OUTPATIENT
Start: 2024-02-05 | End: 2024-08-23

## 2024-02-05 RX ORDER — FERROUS SULFATE 325(65) MG
325 TABLET ORAL
Qty: 30 TABLET | Refills: 3 | Status: SHIPPED | OUTPATIENT
Start: 2024-02-06

## 2024-02-05 RX ORDER — FERROUS SULFATE 325(65) MG
325 TABLET ORAL
Status: DISCONTINUED | OUTPATIENT
Start: 2024-02-06 | End: 2024-02-05 | Stop reason: HOSPADM

## 2024-02-05 RX ORDER — HYDRALAZINE HYDROCHLORIDE 50 MG/1
50 TABLET, FILM COATED ORAL EVERY 8 HOURS SCHEDULED
Status: DISCONTINUED | OUTPATIENT
Start: 2024-02-05 | End: 2024-02-05 | Stop reason: HOSPADM

## 2024-02-05 RX ORDER — INSULIN LISPRO 100 [IU]/ML
5 INJECTION, SOLUTION INTRAVENOUS; SUBCUTANEOUS
Status: DISCONTINUED | OUTPATIENT
Start: 2024-02-05 | End: 2024-02-05 | Stop reason: HOSPADM

## 2024-02-05 RX ORDER — ERGOCALCIFEROL 1.25 MG/1
50000 CAPSULE ORAL WEEKLY
Qty: 5 CAPSULE | Refills: 0 | Status: SHIPPED | OUTPATIENT
Start: 2024-02-10 | End: 2024-03-24

## 2024-02-05 RX ORDER — CHOLECALCIFEROL (VITAMIN D3) 50 MCG
2000 TABLET ORAL DAILY
Qty: 60 TABLET | Refills: 2 | Status: SHIPPED | OUTPATIENT
Start: 2024-03-30

## 2024-02-05 RX ADMIN — ENOXAPARIN SODIUM 30 MG: 100 INJECTION SUBCUTANEOUS at 09:47

## 2024-02-05 RX ADMIN — HYDRALAZINE HYDROCHLORIDE 50 MG: 50 TABLET ORAL at 15:34

## 2024-02-05 RX ADMIN — SENNOSIDES 17.2 MG: 8.6 TABLET, FILM COATED ORAL at 09:47

## 2024-02-05 RX ADMIN — PANTOPRAZOLE SODIUM 40 MG: 40 TABLET, DELAYED RELEASE ORAL at 06:16

## 2024-02-05 RX ADMIN — AMLODIPINE BESYLATE 10 MG: 10 TABLET ORAL at 09:47

## 2024-02-05 RX ADMIN — OXYBUTYNIN CHLORIDE 5 MG: 5 TABLET ORAL at 09:47

## 2024-02-05 RX ADMIN — HYDRALAZINE HYDROCHLORIDE 25 MG: 25 TABLET ORAL at 06:16

## 2024-02-05 RX ADMIN — LEVOTHYROXINE SODIUM 75 MCG: 75 TABLET ORAL at 06:16

## 2024-02-05 RX ADMIN — CARVEDILOL 25 MG: 25 TABLET, FILM COATED ORAL at 09:47

## 2024-02-05 RX ADMIN — SODIUM CHLORIDE, PRESERVATIVE FREE 10 ML: 5 INJECTION INTRAVENOUS at 09:48

## 2024-02-05 RX ADMIN — OXYBUTYNIN CHLORIDE 5 MG: 5 TABLET ORAL at 15:34

## 2024-02-05 NOTE — DISCHARGE INSTRUCTIONS
- please schedule an appointment to see your PCP  - please schedule an appointment to see nephrology, endocrinology and GI  - please go to lab in one week for blood work  - please take medications as prescribed

## 2024-02-05 NOTE — PROGRESS NOTES
Physical Therapy  Name: Greg Easton MRN: [id number] :   1940   Date:  2024   Admission Date: 2024 Room:  [address] [place], [place] [address]A   Restrictions/Precautions:  Restrictions/Precautions  Restrictions/Precautions: General Precautions, Fall Risk         Communication with other providers:  Desirae QUIÑONES states pt is ok to see for therapy.   Partial-COTX with OT Jimmy, and sOT Zbigniew, per patient tolerance and safety with rehab.    Discharge recommendation: SNF    Subjective:  Patient states:  \"I've already paid all aht with [facility], and I don't want to again...\" and \"I think going to the senior center and using their equipment is better...\" Patient refuses SNF placement and states that he plans on using senior center facilities to exercise with his son  Pain:   Location, Type, Intensity (0/10 to 10/10):  does not c/o pain    Objective:    Observation:  Patient is supine in bed upon arrival.   Vitals : Patient is on room air     Treatment, including education/measures:    Transfers with line management of TeleMonitor, Hopper Catheter  Supine to sit :Min A, with HOB elevated. Assist for trunk management  Seated balance: Patient requires SBA for static sitting   Neuro-ozzy: Verbal and tactile cues for seated upright posture. Manual assist needed for trunk adjustment to midline. Patient SBA during seated activities with occupational training  Sit to stand :CGA with added time from EOB  Stand to sit :Min A to low recliner. Patient needs cues for BUE effort for eccentric control  Standing balance:Patient requires CGA for static standing, CGA-Min A for dynamic standing   Neuro-ozzy: Verbal and tactile cues for standing upright posture. Manual assist needed for trunk adjustment to midline. Patient Min A during standing activities with occupational training    Gait:  Pt amb with RW for 100 ft with CGA-SB assist. The patient demo's staggered pathway, forward flexed posture, shuffled gait pattern, and decrease step length and

## 2024-02-05 NOTE — CARE COORDINATION
.CM met with pt and 2 of his sons for d/c planning.  Introduced self and updated white board. Permission received to discuss d/c planning with sons at bedside.   Pt states that his other son lives with him.  Family provides his transportation He has a PCP, has insurance, and is able to afford his medication. Pt has the DME he needs.  He has private duty HHC from [facility].  Discussed the PT recommendation for SNF.  Pt refused SNF, he states that he is going home.  HHC offered and pt is agreeable. Pt's osn has requested [facility] d/t he has had them before.  Pt denies any other d/c needs at this time.  D/c plan is home with son and HHC.  Referral made to Nadine/[facility].  Informed her of d/c.  Faxed the facesheet, h&p, AVS and [facility] orders to [phone].  TE     02/05/24 9660   Service Assessment   Patient Orientation Alert and Oriented   Cognition Alert   History Provided By Patient   Primary Caregiver Self   Accompanied By/Relationship 2 sons   Support Systems Children   Patient's Healthcare Decision Maker is:   (SELF)   PCP Verified by CM Yes   Prior Functional Level Assistance with the following:;Housework   Current Functional Level Assistance with the following:;Housework   Can patient return to prior living arrangement Yes   Ability to make needs known: Good   Family able to assist with home care needs: Yes   Financial Resources Medicare   Community Resources ECF/Home Care  (PRIVATE PAY [facility] FOR HOUSEHOLD [facility])   Social/Functional History   Lives With Son   Type of Home House   Home Layout One level   Home Access Ramped entrance   Bathroom Shower/Tub Walk-in shower   Bathroom Toilet Standard   Bathroom Equipment Grab bars in shower;Shower chair   Bathroom Accessibility Accessible   Home Equipment Cane;Wheelchair-manual;Hospital bed;Rollator   Receives Help From Family   Ambulation Assistance Independent   Transfer Assistance Independent   Active  No   Patient's  Info FAMILY PROVIDES HIS  TRANSPORTATION   Mode of Transportation Car   Discharge Planning   Type of Residence House   Living Arrangements Children   Current Services Prior To Admission Private Duty Homecare  ([facility])   Potential Assistance Needed N/A   DME Ordered? No   Potential Assistance Purchasing Medications No   Type of Home Care Services Housekeeping   Patient expects to be discharged to: House   Services At/After Discharge   Transition of Care Consult (CM Consult) Home Health   Services At/After Discharge Home Health;PT;OT;Housekeeping   Condition of Participation: Discharge Planning   The Patient and/or Patient Representative was provided with a Choice of Provider? Patient   The Patient and/Or Patient Representative agree with the Discharge Plan? Yes   Freedom of Choice list was provided with basic dialogue that supports the patient's individualized plan of care/goals, treatment preferences, and shares the quality data associated with the providers?  Yes

## 2024-02-05 NOTE — PLAN OF CARE
Problem: Discharge Planning  Goal: Discharge to home or other facility with appropriate resources  2/5/2024 1522 by Queenie Travis RN  Outcome: Adequate for Discharge  2/5/2024 0402 by Kimmie Pate RN  Outcome: Progressing     Problem: Pain  Goal: Verbalizes/displays adequate comfort level or baseline comfort level  2/5/2024 1522 by Queenie Travis RN  Outcome: Adequate for Discharge  2/5/2024 0402 by Kimmie Pate RN  Outcome: Progressing     Problem: Safety - Adult  Goal: Free from fall injury  2/5/2024 1522 by Queenie Travis RN  Outcome: Adequate for Discharge  2/5/2024 0402 by Kimmie Pate RN  Outcome: Progressing     Problem: Skin/Tissue Integrity  Goal: Absence of new skin breakdown  Description: 1.  Monitor for areas of redness and/or skin breakdown  2.  Assess vascular access sites hourly  3.  Every 4-6 hours minimum:  Change oxygen saturation probe site  4.  Every 4-6 hours:  If on nasal continuous positive airway pressure, respiratory therapy assess nares and determine need for appliance change or resting period.  2/5/2024 1522 by Queenie Travis RN  Outcome: Adequate for Discharge  2/5/2024 0402 by Kimmie Pate RN  Outcome: Progressing     Problem: ABCDS Injury Assessment  Goal: Absence of physical injury  Outcome: Adequate for Discharge

## 2024-02-05 NOTE — DISCHARGE SUMMARY
Discharge Summary    Name:  Greg Easton /Age/Sex: 1940  (83 y.o. male)   MRN & CSN:  [id number] & [id number] Admission Date/Time: 2024 12:19 PM   Attending:  Lc De Paz MD Discharging Physician: Lc De Paz MD     Hospital Course:   Greg Easton is a 83 y.o.  male  who presents with Hypoglycemia    HPI  \"Patient is a 83-year-old male with a PMHx of HTN, T2DM, CKD3, AoCD and prostate cancer s/p prostatectomy in  has chronic SPC who presented to the ED with low BG readings at home with generalized weakness. Has decreased oral intake, but continued to use regular dose of Insulin. Also endorsed generalized weakness and repeat falls at home. Denied any fevers, chills, CP, SOB, cough, N/V, abdominal pain, C/D or urinary changes. Denied any tobacco or alcohol use.   \"       The following problems have been addressed during this hospitalization.  T2DM with symptomatic hypoglycemia ; glucose 41 on arrival-improving   Endorsed low BG readings at home with generalized weakness. Has decreased oral intake, but continued to use regular dose of Insulin. Not on DOWNS. 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 Cr 2.4 on admission;  baseline ~ 2.0-2.2.   Cr 2.1, K 4.7 on day of discharge.   Prot/cr ration 1.3.   Received IVF and lokelma. No sign of volume overload.   - home Losartan-HCTZ resumed on discharge   - pt to follow up w nephro outpatient     Essential hypretension  BP has been rising today 170's as his home Losartan-HCTZ held during hospitalization in setting of PAMELLA and hyperkalemia  - continue Coreg and Norvasc.   - was on hydralazine inpatient but d/ismael on same home regimen  - pt to resume home Losartan-HCTZ

## 2024-02-05 NOTE — PROGRESS NOTES
Occupational Therapy    [facility] ACUTE CARE OCCUPATIONAL THERAPY EVALUATION    Greg Easton, 1940, 3116/3116-A, 2/5/2024    Discharge Recommendation: Skilled Nursing Facility ([facility] Bed would be ideal)    History:  [place]:  The primary encounter diagnosis was Hypoglycemia. Diagnoses of Hyperkalemia and Anemia of chronic disease were also pertinent to this visit.    Subjective:  Patient states: \"I'm going to the [facility] to use the weights when I get out of here!\"   Pain: Pt reported 5/10 hip pain at rest  Communication with other providers: RN Desirae, DION Dickey  Restrictions: General Precautions, Fall Risk, Contact Precautions, IV, Hopper, Bed/chair alarm    Home Setup/Prior level of function:  Social/Functional History  Lives With: Son  Type of Home: House  Home Layout: One level  Home Access: Ramped entrance  Bathroom Shower/Tub: Walk-in shower  Bathroom Toilet: Standard  Bathroom Equipment: Grab bars in shower, Shower chair  Bathroom Accessibility: Accessible  Home Equipment: Cane, Wheelchair-manual, Hospital bed, Rollator  Receives Help From: Family  ADL Assistance: Needs assistance (has assistance with donning footwear)  Homemaking Assistance: Needs assistance (son manages)  Ambulation Assistance: Independent (mod I with 4ww)  Transfer Assistance: Independent  Active : No (son drives)  Mode of Transportation: Car  Occupation: Retired ([employer])    Examination:  Observation: Supine in bed upon arrival. Agreeable to evaluation. Two sons present and supportive.  Vision: WFL (Glasses)  Hearing: WFL  Vitals: Stable vitals throughout session on room air    Body Systems and functions:  ROM: Rt UE active shoulder flexion grossly 0-50', Lt UE active shoulder flexion grossly 0-90', WFL distally in BL UEs  Strength: 4/5 MMT elbow flexors, elbow extensors, and grasp in BL UEs  Sensation: WFL (denies numbness/tingling)  Tone: Normal  Coordination: WFL for  Swing Bed at discharge.    Complexity: Moderate  Prognosis: Good  Plan: Eval and discharge; d/c order in    Time:   Time in: 1438  Time out: 1517  Timed treatment minutes: 24  Total time: 39    Electronically signed by:    EMA Bowen/L, MOT, [id number]

## 2024-02-06 LAB
CULTURE: ABNORMAL
Lab: ABNORMAL
SPECIMEN: ABNORMAL

## 2024-02-06 NOTE — PROGRESS NOTES
Progress Note( Dr. Garcia)  2/5/2024  Subjective:   Admit Date: 2/1/2024  PCP: Jacobo Zazueta MD    Admitted For : Generalized weakness and hypoglycemia     Consulted For: Better control of blood glucose     Interval History: Patient still feels tired has lower blood glucose below 100 this morning  Hemoglobin dropped to 6.7 he was started on 1 unit of packed red cell    Blood glucose dropped again even with the 5 units of Lantus insulin when blood glucose was 164  He received his insulin very late night    Serum potassium was 5.0 and serum creatinine remains about 2.3    Denies any chest pains,   Denies SOB .   Denies nausea or vomiting.   No new bowel or bladder symptoms.       Intake/Output Summary (Last 24 hours) at 2/5/2024 2050  Last data filed at 2/5/2024 0859  Gross per 24 hour   Intake 140 ml   Output 450 ml   Net -310 ml         DATA    CBC:   Recent Labs     02/03/24  1014 02/04/24  0052 02/04/24  1409 02/05/24  1057   WBC 6.5 6.1  --  7.3   HGB 7.4* 6.7* 9.3* 9.0*    191  --  215      CMP:  Recent Labs     02/03/24  1014 02/04/24  0052 02/05/24  1057    137 138   K 5.4* 5.0 4.7    107 104   CO2 22 20* 22   BUN 39* 38* 32*   CREATININE 2.4* 2.3* 2.1*   CALCIUM 7.9* 7.7* 8.2*   PROT 5.6* 5.5* 6.1*   LABALBU 3.3* 3.1* 3.5   BILITOT 0.2 0.2 0.2   ALKPHOS 70 74 86   AST 16 13* 14*   ALT 12 11 11       Lipids:   Lab Results   Component Value Date/Time    CHOL 171 05/30/2023 06:48 AM    HDL 43 05/30/2023 06:48 AM    TRIG 129 05/30/2023 06:48 AM     Glucose:  Recent Labs     02/05/24  0703 02/05/24  1152 02/05/24  1640   POCGLU 123* 183* 174*       [id number]:  Lab Results   Component Value Date/Time    LABA1C 6.3 02/02/2024 02:55 AM     High Sensitivity TSH:   Lab Results   Component Value Date/Time    TSHHS 3.070 02/02/2024 02:55 AM     Free T3: No results found for: \"FT3\"  Free T4:  Lab Results   Component Value Date/Time    T4FREE 1.55 05/24/2023 01:26 AM       XR HIP LEFT (2-3

## 2024-03-07 ENCOUNTER — APPOINTMENT (OUTPATIENT)
Dept: CT IMAGING | Age: 84
DRG: 392 | End: 2024-03-07
Payer: MEDICARE

## 2024-03-07 ENCOUNTER — APPOINTMENT (OUTPATIENT)
Dept: GENERAL RADIOLOGY | Age: 84
DRG: 392 | End: 2024-03-07
Payer: MEDICARE

## 2024-03-07 ENCOUNTER — HOSPITAL ENCOUNTER (INPATIENT)
Age: 84
LOS: 3 days | Discharge: HOME | DRG: 392 | End: 2024-03-10
Attending: INTERNAL MEDICINE | Admitting: INTERNAL MEDICINE
Payer: MEDICARE

## 2024-03-07 DIAGNOSIS — K56.41 FECAL IMPACTION (HCC): Primary | ICD-10-CM

## 2024-03-07 DIAGNOSIS — K52.89 STERCORAL COLITIS: ICD-10-CM

## 2024-03-07 DIAGNOSIS — Z96.0 INDWELLING URINARY CATHETER PRESENT: ICD-10-CM

## 2024-03-07 PROBLEM — K59.00 CONSTIPATION: Status: ACTIVE | Noted: 2024-03-07

## 2024-03-07 LAB
ALBUMIN SERPL-MCNC: 3.5 GM/DL (ref 3.4–5)
ALP BLD-CCNC: 97 IU/L (ref 40–128)
ALT SERPL-CCNC: 11 U/L (ref 10–40)
ANION GAP SERPL CALCULATED.3IONS-SCNC: 10 MMOL/L (ref 7–16)
AST SERPL-CCNC: 15 IU/L (ref 15–37)
BACTERIA: ABNORMAL /HPF
BASOPHILS ABSOLUTE: 0 K/CU MM
BASOPHILS RELATIVE PERCENT: 0.5 % (ref 0–1)
BILIRUB SERPL-MCNC: 0.2 MG/DL (ref 0–1)
BILIRUBIN URINE: NEGATIVE MG/DL
BLOOD, URINE: ABNORMAL
BUN SERPL-MCNC: 44 MG/DL (ref 6–23)
CALCIUM SERPL-MCNC: 8.7 MG/DL (ref 8.3–10.6)
CHLORIDE BLD-SCNC: 105 MMOL/L (ref 99–110)
CLARITY: ABNORMAL
CO2: 22 MMOL/L (ref 21–32)
COLOR: YELLOW
CREAT SERPL-MCNC: 2.6 MG/DL (ref 0.9–1.3)
DIFFERENTIAL TYPE: ABNORMAL
EKG ATRIAL RATE: 60 BPM
EKG DIAGNOSIS: NORMAL
EKG P AXIS: 90 DEGREES
EKG P-R INTERVAL: 214 MS
EKG Q-T INTERVAL: 406 MS
EKG QRS DURATION: 72 MS
EKG QTC CALCULATION (BAZETT): 406 MS
EKG R AXIS: -7 DEGREES
EKG T AXIS: 7 DEGREES
EKG VENTRICULAR RATE: 60 BPM
EOSINOPHILS ABSOLUTE: 0.8 K/CU MM
EOSINOPHILS RELATIVE PERCENT: 10 % (ref 0–3)
GFR SERPL CREATININE-BSD FRML MDRD: 23 ML/MIN/1.73M2
GLUCOSE BLD-MCNC: 168 MG/DL (ref 70–99)
GLUCOSE BLD-MCNC: 184 MG/DL (ref 70–99)
GLUCOSE SERPL-MCNC: 198 MG/DL (ref 70–99)
GLUCOSE, URINE: NEGATIVE MG/DL
HCT VFR BLD CALC: 30.3 % (ref 42–52)
HEMOGLOBIN: 8.9 GM/DL (ref 13.5–18)
IMMATURE NEUTROPHIL %: 0.4 % (ref 0–0.43)
KETONES, URINE: NEGATIVE MG/DL
LACTIC ACID, SEPSIS: 0.7 MMOL/L (ref 0.4–2)
LACTIC ACID, SEPSIS: 0.9 MMOL/L (ref 0.4–2)
LEUKOCYTE ESTERASE, URINE: ABNORMAL
LIPASE: 34 IU/L (ref 13–60)
LYMPHOCYTES ABSOLUTE: 1.5 K/CU MM
LYMPHOCYTES RELATIVE PERCENT: 18.3 % (ref 24–44)
MAGNESIUM: 1.9 MG/DL (ref 1.8–2.4)
MCH RBC QN AUTO: 29.3 PG (ref 27–31)
MCHC RBC AUTO-ENTMCNC: 29.4 % (ref 32–36)
MCV RBC AUTO: 99.7 FL (ref 78–100)
MONOCYTES ABSOLUTE: 0.7 K/CU MM
MONOCYTES RELATIVE PERCENT: 9 % (ref 0–4)
MUCUS: ABNORMAL HPF
NITRITE URINE, QUANTITATIVE: NEGATIVE
NUCLEATED RBC %: 0 %
PDW BLD-RTO: 15.5 % (ref 11.7–14.9)
PH, URINE: 7 (ref 5–8)
PLATELET # BLD: 217 K/CU MM (ref 140–440)
PMV BLD AUTO: 11.2 FL (ref 7.5–11.1)
POTASSIUM SERPL-SCNC: 5 MMOL/L (ref 3.5–5.1)
PROTEIN UA: 30 MG/DL
RBC # BLD: 3.04 M/CU MM (ref 4.6–6.2)
RBC URINE: 69 /HPF (ref 0–3)
SEGMENTED NEUTROPHILS ABSOLUTE COUNT: 4.9 K/CU MM
SEGMENTED NEUTROPHILS RELATIVE PERCENT: 61.8 % (ref 36–66)
SODIUM BLD-SCNC: 137 MMOL/L (ref 135–145)
SPECIFIC GRAVITY UA: 1.01 (ref 1–1.03)
TOTAL IMMATURE NEUTOROPHIL: 0.03 K/CU MM
TOTAL NUCLEATED RBC: 0 K/CU MM
TOTAL PROTEIN: 6.6 GM/DL (ref 6.4–8.2)
TRICHOMONAS: ABNORMAL /HPF
UROBILINOGEN, URINE: 0.2 MG/DL (ref 0.2–1)
WBC # BLD: 7.9 K/CU MM (ref 4–10.5)
WBC CLUMP: ABNORMAL /HPF
WBC UA: 567 /HPF (ref 0–2)

## 2024-03-07 PROCEDURE — 2580000003 HC RX 258: Performed by: INTERNAL MEDICINE

## 2024-03-07 PROCEDURE — 6370000000 HC RX 637 (ALT 250 FOR IP): Performed by: PHYSICIAN ASSISTANT

## 2024-03-07 PROCEDURE — 99222 1ST HOSP IP/OBS MODERATE 55: CPT | Performed by: INTERNAL MEDICINE

## 2024-03-07 PROCEDURE — 96365 THER/PROPH/DIAG IV INF INIT: CPT

## 2024-03-07 PROCEDURE — 74176 CT ABD & PELVIS W/O CONTRAST: CPT

## 2024-03-07 PROCEDURE — 6370000000 HC RX 637 (ALT 250 FOR IP): Performed by: LICENSED PRACTICAL NURSE

## 2024-03-07 PROCEDURE — 6370000000 HC RX 637 (ALT 250 FOR IP): Performed by: INTERNAL MEDICINE

## 2024-03-07 PROCEDURE — 85025 COMPLETE CBC W/AUTO DIFF WBC: CPT

## 2024-03-07 PROCEDURE — 6360000002 HC RX W HCPCS: Performed by: PHYSICIAN ASSISTANT

## 2024-03-07 PROCEDURE — 93010 ELECTROCARDIOGRAM REPORT: CPT | Performed by: INTERNAL MEDICINE

## 2024-03-07 PROCEDURE — APPNB60 APP NON BILLABLE TIME 46-60 MINS: Performed by: LICENSED PRACTICAL NURSE

## 2024-03-07 PROCEDURE — 81001 URINALYSIS AUTO W/SCOPE: CPT

## 2024-03-07 PROCEDURE — 99285 EMERGENCY DEPT VISIT HI MDM: CPT

## 2024-03-07 PROCEDURE — 6360000002 HC RX W HCPCS: Performed by: FAMILY MEDICINE

## 2024-03-07 PROCEDURE — 94761 N-INVAS EAR/PLS OXIMETRY MLT: CPT

## 2024-03-07 PROCEDURE — 96372 THER/PROPH/DIAG INJ SC/IM: CPT

## 2024-03-07 PROCEDURE — 82962 GLUCOSE BLOOD TEST: CPT

## 2024-03-07 PROCEDURE — 2580000003 HC RX 258: Performed by: PHYSICIAN ASSISTANT

## 2024-03-07 PROCEDURE — 93005 ELECTROCARDIOGRAM TRACING: CPT | Performed by: PHYSICIAN ASSISTANT

## 2024-03-07 PROCEDURE — 83690 ASSAY OF LIPASE: CPT

## 2024-03-07 PROCEDURE — 1200000000 HC SEMI PRIVATE

## 2024-03-07 PROCEDURE — 83605 ASSAY OF LACTIC ACID: CPT

## 2024-03-07 PROCEDURE — 87086 URINE CULTURE/COLONY COUNT: CPT

## 2024-03-07 PROCEDURE — 80053 COMPREHEN METABOLIC PANEL: CPT

## 2024-03-07 PROCEDURE — 83735 ASSAY OF MAGNESIUM: CPT

## 2024-03-07 RX ORDER — BISACODYL 10 MG
10 SUPPOSITORY, RECTAL RECTAL ONCE
Status: COMPLETED | OUTPATIENT
Start: 2024-03-07 | End: 2024-03-07

## 2024-03-07 RX ORDER — HYDRALAZINE HYDROCHLORIDE 20 MG/ML
10 INJECTION INTRAMUSCULAR; INTRAVENOUS EVERY 4 HOURS PRN
Status: DISCONTINUED | OUTPATIENT
Start: 2024-03-07 | End: 2024-03-07

## 2024-03-07 RX ORDER — ACETAMINOPHEN 325 MG/1
650 TABLET ORAL EVERY 6 HOURS PRN
Status: DISCONTINUED | OUTPATIENT
Start: 2024-03-07 | End: 2024-03-10 | Stop reason: HOSPADM

## 2024-03-07 RX ORDER — SODIUM CHLORIDE 9 MG/ML
INJECTION, SOLUTION INTRAVENOUS PRN
Status: DISCONTINUED | OUTPATIENT
Start: 2024-03-07 | End: 2024-03-10 | Stop reason: HOSPADM

## 2024-03-07 RX ORDER — SODIUM CHLORIDE 0.9 % (FLUSH) 0.9 %
5-40 SYRINGE (ML) INJECTION PRN
Status: DISCONTINUED | OUTPATIENT
Start: 2024-03-07 | End: 2024-03-10 | Stop reason: HOSPADM

## 2024-03-07 RX ORDER — ALBUTEROL SULFATE 90 UG/1
1 AEROSOL, METERED RESPIRATORY (INHALATION) EVERY 4 HOURS PRN
Status: DISCONTINUED | OUTPATIENT
Start: 2024-03-07 | End: 2024-03-10 | Stop reason: HOSPADM

## 2024-03-07 RX ORDER — INSULIN LISPRO 100 [IU]/ML
0-4 INJECTION, SOLUTION INTRAVENOUS; SUBCUTANEOUS NIGHTLY
Status: DISCONTINUED | OUTPATIENT
Start: 2024-03-07 | End: 2024-03-10 | Stop reason: HOSPADM

## 2024-03-07 RX ORDER — CIPROFLOXACIN 500 MG/1
500 TABLET, FILM COATED ORAL
Status: DISCONTINUED | OUTPATIENT
Start: 2024-03-07 | End: 2024-03-10 | Stop reason: HOSPADM

## 2024-03-07 RX ORDER — INSULIN GLARGINE-YFGN 100 [IU]/ML
INJECTION, SOLUTION SUBCUTANEOUS NIGHTLY
Status: ON HOLD | COMMUNITY
Start: 2024-02-13

## 2024-03-07 RX ORDER — PANTOPRAZOLE SODIUM 40 MG/1
40 TABLET, DELAYED RELEASE ORAL
Status: DISCONTINUED | OUTPATIENT
Start: 2024-03-08 | End: 2024-03-10 | Stop reason: HOSPADM

## 2024-03-07 RX ORDER — CARVEDILOL 25 MG/1
25 TABLET ORAL 2 TIMES DAILY WITH MEALS
Status: DISCONTINUED | OUTPATIENT
Start: 2024-03-07 | End: 2024-03-10 | Stop reason: HOSPADM

## 2024-03-07 RX ORDER — ONDANSETRON 4 MG/1
4 TABLET, ORALLY DISINTEGRATING ORAL EVERY 8 HOURS PRN
Status: DISCONTINUED | OUTPATIENT
Start: 2024-03-07 | End: 2024-03-10 | Stop reason: HOSPADM

## 2024-03-07 RX ORDER — POLYETHYLENE GLYCOL 3350 17 G/17G
17 POWDER, FOR SOLUTION ORAL DAILY PRN
Status: DISCONTINUED | OUTPATIENT
Start: 2024-03-07 | End: 2024-03-07

## 2024-03-07 RX ORDER — ATORVASTATIN CALCIUM 10 MG/1
20 TABLET, FILM COATED ORAL NIGHTLY
Status: DISCONTINUED | OUTPATIENT
Start: 2024-03-07 | End: 2024-03-10 | Stop reason: HOSPADM

## 2024-03-07 RX ORDER — GLUCAGON 1 MG/ML
1 KIT INJECTION PRN
Status: DISCONTINUED | OUTPATIENT
Start: 2024-03-07 | End: 2024-03-10 | Stop reason: HOSPADM

## 2024-03-07 RX ORDER — FERROUS SULFATE 325(65) MG
325 TABLET ORAL
Status: DISCONTINUED | OUTPATIENT
Start: 2024-03-08 | End: 2024-03-07

## 2024-03-07 RX ORDER — LACTULOSE 10 G/15ML
10 SOLUTION ORAL ONCE
Status: COMPLETED | OUTPATIENT
Start: 2024-03-07 | End: 2024-03-07

## 2024-03-07 RX ORDER — DICYCLOMINE HYDROCHLORIDE 10 MG/ML
20 INJECTION INTRAMUSCULAR ONCE
Status: COMPLETED | OUTPATIENT
Start: 2024-03-07 | End: 2024-03-07

## 2024-03-07 RX ORDER — LABETALOL HYDROCHLORIDE 5 MG/ML
10 INJECTION, SOLUTION INTRAVENOUS EVERY 4 HOURS PRN
Status: DISCONTINUED | OUTPATIENT
Start: 2024-03-07 | End: 2024-03-07

## 2024-03-07 RX ORDER — DEXTROSE MONOHYDRATE 100 MG/ML
INJECTION, SOLUTION INTRAVENOUS CONTINUOUS PRN
Status: DISCONTINUED | OUTPATIENT
Start: 2024-03-07 | End: 2024-03-10 | Stop reason: HOSPADM

## 2024-03-07 RX ORDER — POLYETHYLENE GLYCOL 3350 17 G/17G
17 POWDER, FOR SOLUTION ORAL DAILY
Status: DISCONTINUED | OUTPATIENT
Start: 2024-03-08 | End: 2024-03-10

## 2024-03-07 RX ORDER — INSULIN LISPRO 100 [IU]/ML
0-4 INJECTION, SOLUTION INTRAVENOUS; SUBCUTANEOUS
Status: DISCONTINUED | OUTPATIENT
Start: 2024-03-07 | End: 2024-03-10 | Stop reason: HOSPADM

## 2024-03-07 RX ORDER — AMLODIPINE BESYLATE 10 MG/1
10 TABLET ORAL DAILY
Status: DISCONTINUED | OUTPATIENT
Start: 2024-03-07 | End: 2024-03-10 | Stop reason: HOSPADM

## 2024-03-07 RX ORDER — 0.9 % SODIUM CHLORIDE 0.9 %
500 INTRAVENOUS SOLUTION INTRAVENOUS ONCE
Status: COMPLETED | OUTPATIENT
Start: 2024-03-07 | End: 2024-03-07

## 2024-03-07 RX ORDER — METRONIDAZOLE 250 MG/1
500 TABLET ORAL EVERY 8 HOURS SCHEDULED
Status: DISCONTINUED | OUTPATIENT
Start: 2024-03-07 | End: 2024-03-10 | Stop reason: HOSPADM

## 2024-03-07 RX ORDER — SODIUM CHLORIDE 9 MG/ML
INJECTION, SOLUTION INTRAVENOUS CONTINUOUS
Status: DISPENSED | OUTPATIENT
Start: 2024-03-07 | End: 2024-03-08

## 2024-03-07 RX ORDER — ASPIRIN 81 MG/1
81 TABLET, CHEWABLE ORAL NIGHTLY
Status: DISCONTINUED | OUTPATIENT
Start: 2024-03-07 | End: 2024-03-10 | Stop reason: HOSPADM

## 2024-03-07 RX ORDER — SODIUM CHLORIDE 0.9 % (FLUSH) 0.9 %
5-40 SYRINGE (ML) INJECTION EVERY 12 HOURS SCHEDULED
Status: DISCONTINUED | OUTPATIENT
Start: 2024-03-07 | End: 2024-03-10 | Stop reason: HOSPADM

## 2024-03-07 RX ORDER — CIPROFLOXACIN 500 MG/1
500 TABLET, FILM COATED ORAL EVERY 12 HOURS SCHEDULED
Status: DISCONTINUED | OUTPATIENT
Start: 2024-03-07 | End: 2024-03-07 | Stop reason: DRUGHIGH

## 2024-03-07 RX ORDER — ONDANSETRON 2 MG/ML
4 INJECTION INTRAMUSCULAR; INTRAVENOUS EVERY 6 HOURS PRN
Status: DISCONTINUED | OUTPATIENT
Start: 2024-03-07 | End: 2024-03-10 | Stop reason: HOSPADM

## 2024-03-07 RX ORDER — ONDANSETRON 2 MG/ML
4 INJECTION INTRAMUSCULAR; INTRAVENOUS EVERY 30 MIN PRN
Status: DISCONTINUED | OUTPATIENT
Start: 2024-03-07 | End: 2024-03-07 | Stop reason: ALTCHOICE

## 2024-03-07 RX ORDER — ACETAMINOPHEN 650 MG/1
650 SUPPOSITORY RECTAL EVERY 6 HOURS PRN
Status: DISCONTINUED | OUTPATIENT
Start: 2024-03-07 | End: 2024-03-10 | Stop reason: HOSPADM

## 2024-03-07 RX ORDER — LEVOTHYROXINE SODIUM 0.07 MG/1
75 TABLET ORAL DAILY
Status: DISCONTINUED | OUTPATIENT
Start: 2024-03-08 | End: 2024-03-10 | Stop reason: HOSPADM

## 2024-03-07 RX ORDER — ENOXAPARIN SODIUM 100 MG/ML
30 INJECTION SUBCUTANEOUS DAILY
Status: DISCONTINUED | OUTPATIENT
Start: 2024-03-08 | End: 2024-03-10 | Stop reason: HOSPADM

## 2024-03-07 RX ADMIN — SODIUM CHLORIDE 500 ML: 9 INJECTION, SOLUTION INTRAVENOUS at 09:23

## 2024-03-07 RX ADMIN — CARVEDILOL 25 MG: 25 TABLET, FILM COATED ORAL at 18:01

## 2024-03-07 RX ADMIN — AMLODIPINE BESYLATE 10 MG: 10 TABLET ORAL at 15:50

## 2024-03-07 RX ADMIN — LACTULOSE 10 G: 20 SOLUTION ORAL at 12:35

## 2024-03-07 RX ADMIN — SODIUM CHLORIDE, PRESERVATIVE FREE 10 ML: 5 INJECTION INTRAVENOUS at 21:17

## 2024-03-07 RX ADMIN — PIPERACILLIN AND TAZOBACTAM 3375 MG: 3; .375 INJECTION, POWDER, FOR SOLUTION INTRAVENOUS at 12:52

## 2024-03-07 RX ADMIN — ASPIRIN 81 MG: 81 TABLET, CHEWABLE ORAL at 21:16

## 2024-03-07 RX ADMIN — METRONIDAZOLE 500 MG: 250 TABLET ORAL at 21:16

## 2024-03-07 RX ADMIN — POLYETHYLENE GLYCOL 3350, SODIUM SULFATE ANHYDROUS, SODIUM BICARBONATE, SODIUM CHLORIDE, POTASSIUM CHLORIDE 2000 ML: 236; 22.74; 6.74; 5.86; 2.97 POWDER, FOR SOLUTION ORAL at 17:52

## 2024-03-07 RX ADMIN — SODIUM CHLORIDE: 9 INJECTION, SOLUTION INTRAVENOUS at 16:07

## 2024-03-07 RX ADMIN — ATORVASTATIN CALCIUM 20 MG: 10 TABLET, FILM COATED ORAL at 21:16

## 2024-03-07 RX ADMIN — DICYCLOMINE HYDROCHLORIDE 20 MG: 10 INJECTION, SOLUTION INTRAMUSCULAR at 09:23

## 2024-03-07 RX ADMIN — BISACODYL 10 MG: 10 SUPPOSITORY RECTAL at 12:38

## 2024-03-07 RX ADMIN — DICYCLOMINE HYDROCHLORIDE 20 MG: 10 INJECTION, SOLUTION INTRAMUSCULAR at 23:36

## 2024-03-07 RX ADMIN — CIPROFLOXACIN 500 MG: 500 TABLET, FILM COATED ORAL at 18:01

## 2024-03-07 ASSESSMENT — PAIN DESCRIPTION - LOCATION
LOCATION: ABDOMEN
LOCATION: BUTTOCKS

## 2024-03-07 ASSESSMENT — PAIN DESCRIPTION - PAIN TYPE: TYPE: ACUTE PAIN

## 2024-03-07 ASSESSMENT — PAIN DESCRIPTION - FREQUENCY: FREQUENCY: OTHER (COMMENT)

## 2024-03-07 ASSESSMENT — PAIN DESCRIPTION - DESCRIPTORS
DESCRIPTORS: PRESSURE
DESCRIPTORS: ACHING;CRAMPING;DISCOMFORT

## 2024-03-07 ASSESSMENT — PAIN - FUNCTIONAL ASSESSMENT: PAIN_FUNCTIONAL_ASSESSMENT: PREVENTS OR INTERFERES SOME ACTIVE ACTIVITIES AND ADLS

## 2024-03-07 ASSESSMENT — PAIN SCALES - GENERAL
PAINLEVEL_OUTOF10: 3
PAINLEVEL_OUTOF10: 7
PAINLEVEL_OUTOF10: 9

## 2024-03-07 ASSESSMENT — PAIN DESCRIPTION - ORIENTATION: ORIENTATION: RIGHT;LEFT;MID;LOWER;UPPER

## 2024-03-07 ASSESSMENT — PAIN DESCRIPTION - ONSET: ONSET: SUDDEN

## 2024-03-07 NOTE — PROGRESS NOTES
Patient seen and examined and briefly chart reviewed  He came to the emergency room for constipation  Electrolytes are acceptable as well as  his kidney function  He has suprapubic catheter-hence urine always have some leukocyturia-I would not recommend treating unless he has symptoms-or follow the CDC catheter associated urinary tract infection guidelines  I would suggest laxative therapy-and close outpatient follow-up.  But if he ended up getting admitted for other acute medical condition-I will follow him up as an inpatient.

## 2024-03-07 NOTE — ED NOTES
ED TO INPATIENT SBAR HANDOFF    Patient Name: Greg Easton   :  1940  83 y.o.   Preferred Name  Greg  Family/Caregiver Present no   Restraints no   C-SSRS: Risk of Suicide: No Risk  Sitter no   Sepsis Risk Score Sepsis Risk Score: 2.2      Situation  No chief complaint on file.    Brief Description of Patient's Condition:   Pt arrived to ED with c/o abdominal pain and constipation. Pt a/o and ambulatory. Pt not on O2. Pt has suprapubic catheter.   Mental Status: oriented, alert, coherent, logical, and thought processes intact  Arrived from: home    Imaging:   CT ABDOMEN PELVIS WO CONTRAST Additional Contrast? None   Final Result      1. Mildly atrophic left kidney. Left ureteral stent in place. No hydronephrosis or hydroureter. 3 mm nonobstructive calculus left renal pelvis.   2. Large amount of residual stool in large bowel including rectum, fecal impaction cannot be excluded.   3. Mild wall thickening of the rectum with perirectal fat stranding suggestive of stercoral colitis.      Electronically signed by Delfino Mills MD        Abnormal labs:   Abnormal Labs Reviewed   CBC WITH AUTO DIFFERENTIAL - Abnormal; Notable for the following components:       Result Value    RBC 3.04 (*)     Hemoglobin 8.9 (*)     Hematocrit 30.3 (*)     MCHC 29.4 (*)     RDW 15.5 (*)     MPV 11.2 (*)     Lymphocytes % 18.3 (*)     Monocytes % 9.0 (*)     Eosinophils % 10.0 (*)     All other components within normal limits   COMPREHENSIVE METABOLIC PANEL - Abnormal; Notable for the following components:    Glucose 198 (*)     BUN 44 (*)     Creatinine 2.6 (*)     Est, Glom Filt Rate 23 (*)     All other components within normal limits   URINALYSIS - Abnormal; Notable for the following components:    Clarity, UA CLOUDY (*)     Blood, Urine MODERATE NUMBER OR AMOUNT OBSERVED (*)     Protein, UA 30 (*)     Leukocyte Esterase, Urine LARGE NUMBER OR AMOUNT OBSERVED (*)     All other components within normal limits   MICROSCOPIC      Recommendation    Incomplete orders   Additional Comments:   If any further questions, please call Sending RN at [phone]    Electronically signed by: Electronically signed by Alliyah Schoenleben, RN on 3/7/2024 at 2:06 PM

## 2024-03-07 NOTE — PROGRESS NOTES
Given aprox 200mL of SS enema at a slow rate, little at a time with 30 min rests inbetween applications. Up to bedside commode and no results yet.

## 2024-03-07 NOTE — ED NOTES
Medication History  [facility]    Patient Name: Greg Easton 1940     Medication history has been completed by: Juana Menendez CPhT    Source(s) of information: insurance claims discharge note and progress notes     Primary Care Physician: Jacobo Zazueta MD     Pharmacy: [facility]    Allergies as of 03/07/2024    (No Known Allergies)        Prior to Admission medications    Medication Sig Start Date End Date Taking? Authorizing Provider   sodium zirconium cyclosilicate (LOKELMA) 10 g PACK oral suspension Take 1 packet by mouth in the morning, at noon, and at bedtime  Patient taking differently: Take 1 packet by mouth three times a week 2/28/24   Kade Suarez MD   ferrous sulfate (IRON 325) 325 (65 Fe) MG tablet Take 1 tablet by mouth daily (with breakfast) 2/6/24   Lc De Paz MD   insulin lispro (HUMALOG) 100 UNIT/ML SOLN injection vial Inject 5 Units into the skin 3 times daily (with meals) 2/5/24 8/23/24  Lc De Paz MD   Ergocalciferol (VITAMIN D) 84358 units CAPS Take 50,000 Units by mouth once a week for 7 doses 2/10/24 3/24/24  Lc De Paz MD   Vitamin D (CHOLECALCIFEROL) 50 MCG (2000 UT) TABS tablet Take 1 tablet by mouth daily 3/30/24   Lc De Paz MD   atorvastatin (LIPITOR) 20 MG tablet Take 1 tablet by mouth nightly 11/9/23   GLEN Hope MD   losartan-hydroCHLOROthiazide (HYZAAR) 50-12.5 MG per tablet Take 1 tablet by mouth 2 times daily  Patient taking differently: Take 1 tablet by mouth 2 times daily 03/07/24 Prescription noted for daily dosing, patient unsure how he is taking this 11/9/23   GLEN Hope MD   miconazole (MICOTIN) 2 % powder Apply topically 2 times daily. 11/9/23   GLEN Hope MD   albuterol sulfate HFA (VENTOLIN HFA) 108 (90 Base) MCG/ACT inhaler Inhale 1 puff into the lungs 4 times daily Indications: shortness of breath    ProviderCelso MD   docusate sodium (COLACE) 100 MG capsule Take 1 capsule by mouth 2  times daily as needed for Constipation    Celso Espinoza MD   polyethylene glycol (GLYCOLAX) 17 GM/SCOOP powder Take 17 g by mouth daily Indications: as needed    Celso Espinoza MD   amLODIPine (NORVASC) 10 MG tablet Take 1 tablet by mouth daily 5/27/23   Giovany Zamarripa MD   carvedilol (COREG) 25 MG tablet Take 1 tablet by mouth 2 times daily    Celso Espinoza MD   senna (SENOKOT) 8.6 MG tablet Take 2 tablets by mouth daily    Celso Espinoza MD   insulin lispro (HUMALOG) 100 UNIT/ML SOLN injection vial Inject 0-8 Units into the skin 3 times daily (with meals)  No Insulin  200-249 2 Units  250-299 4 Units  300-349 6 Units  Over 349 8 Units and notify physician 11/5/22   Reinier Valentin MD   omeprazole (PRILOSEC) 20 MG delayed release capsule Take 2 capsules by mouth daily    Celso Espinoza MD   levothyroxine (SYNTHROID) 75 MCG tablet Take 1 tablet by mouth Daily    Celso Espinoza MD   aspirin 81 MG chewable tablet Take 1 tablet by mouth daily  Patient taking differently: Take 1 tablet by mouth nightly 4/22/22   Valentino Lopez MD   magnesium hydroxide (MILK OF MAGNESIA) 400 MG/5ML suspension Take 30 mLs by mouth daily as needed for Constipation    Celso Espinoza MD   acetaminophen (TYLENOL) 325 MG tablet Take 2 tablets by mouth every 6 hours as needed for Pain 1/6/22   Jude Ryan,      Medications added or changed (ex. new medication, dosage change, interval change, formulation change):  Insulin Glargine (added)    Medications removed from list (include reason, ex. noncompliance, medication cost, therapy complete etc.):   Hyzaar flagged for review, patient unsure if he is taking this once or twice daily, prescription noted for daily dosing.    Comments:  Patient reports he does not know his medications.  States he follows a sheet and fills his box accordingly.  Insulin patient states he follows a sliding scale for his fast acting and for his slow

## 2024-03-07 NOTE — CARE COORDINATION
MCG criteria for fecal  impaction/ gastroenterology reviewed at this time, criteria supports Inpatient Admission. ALMA DELIA,RN/CM

## 2024-03-07 NOTE — CONSULTS
Patient:   Greg Easton    Date:  24  :  1940, 83 y.o.   Nephrologist: Kade Suarez MD  Provider: Jacobo Zazueta MD    Reason for Consult: Acute kidney injury with underlying chronic kidney stage G4 A3    Consult requested by : PAWEL MCKEE     Chief Complaint:   Constipation    HISTORY OF PRESENT ILLNESS/Brief hospital course  AND  brief background history    Mr. Easton, is a 83-year-old male, who was brought to the emergency department by family member with constipation.  Recently he had high potassium, thought to be from underlying chronic kidney disease and concomitant losartan.  Since he needed renin-angiotensin-aldosterone blocker, he was prescribed sodium zirconium cyclosilicate, apparently he read in the [employer] that is not good for his body, and he did not want to take it.  He also has some constipation and abdominal discomfort.  Which prompted his emergency room visit    On arrival in the emergency department, he was afebrile but blood pressure slightly up, his oxygen saturation 100% on ambient air, he underwent several diagnostic tests which include imaging and biochemical    Imaging study which include computed tomography of abdomen and pelvis without administration of intravenous contrast material showed mild atrophic left kidney with left ureteral stent, 3 cm nonobstructive calculus in the left renal pelvis.  No significant hydronephrosis or hydroureter.  But significant residual stool in the large bowel.  With thickening of rectal wall.  Stercoral colitis or suspected.      Biochemical testing showed creatinine 2.6 and potassium 5.0.  Other pertinent abnormal lab include low hemoglobin of 8.9.    Appropriate bodily fluid culture were drawn, empirical antimicrobial agent initiated and normal saline 75 mill an hour were started.  He will be admitted for further evaluation and management    I saw him in the morning the emergency department .  He was alert

## 2024-03-07 NOTE — CONSULTS
[facility] Gastroenterology and Hepatology             MD Sallie Noriega MD Carol Christensen, APRN-CNP       Janellaren Holt, APRN-CNP             [address] [place] [address]             [phone] fax [phone]      Physician attestation:  I have personally seen and examined the patient independently. I have reviewed the patient's available data,including medical history and recent test results. Reviewed and discussed note as documented by NENA.  I agree with the physical exam findings, assessment and plan.     Briefly   83-year-old male with past medical history of diabetes, hypertension, osteoarthritis, pyelonephritis, prostate cancer, tobacco use who presented to the hospital with complaint of abdominal pain constipation.    According to the patient he had colonoscopy approximately 5 to 7 years ago in [place] with [facility].  He underwent CT of the abdomen and pelvis in the ED which showed large amount of residual stool in the large bowel including rectum along with wall thickening of the rectum suggestive of stercoral colitis.  CMP with slightly elevated BUN patient's baseline creatinine of 2.6.  He mentions rectal pain but denies any abdominal pain other than some cramping.    Gen: normal mood, alert  ENT: normal TJ  Cardiovascular: RRR, No M/R/G  Respiratory: CTA BL  Gastrointestinal: Soft,NT ND  Genitourinary: no suprapubic tenderness  Musculoskeletal: no scars  Skin:moist  Neuro: alert and oriented x3    My assessment and plan reveals     #1 fecal impaction  -Status post digital manual disimpaction by gastroenterology team  -Recommend low volume enema  -Ordered GoLytely half dose  -Serial abdominal exam and x-rays  -Will need outpatient aggressive regimen with MiraLAX twice daily.     #2 stercoral colitis  -Likely due to fecal impaction  -Recommend Cipro Flagyl for 7 days    #3 chronic suprapubic Hopper catheter  Will defer to nephrology  LYMPH NODES: No abnormally enlarged nodes. PERITONEUM/RETROPERITONEUM: No ascites or free air. VESSELS: Extensive atherosclerotic calcification of aorta without evidence of aneurysm. ABDOMINAL WALL: Scattered small nodular densities and fat stranding likely injection granulomas.. Small fat-containing inguinal hernias. BONES: No destructive process. Degenerative changes spine.     1. Mildly atrophic left kidney. Left ureteral stent in place. No hydronephrosis or hydroureter. 3 mm nonobstructive calculus left renal pelvis. 2. Large amount of residual stool in large bowel including rectum, fecal impaction cannot be excluded. 3. Mild wall thickening of the rectum with perirectal fat stranding suggestive of stercoral colitis. Electronically signed by Delfino Mills MD    Recent Results (from the past 24 hour(s))   CBC with Auto Differential    Collection Time: 03/07/24  8:58 AM   Result Value Ref Range    WBC 7.9 4.0 - 10.5 K/CU MM    RBC 3.04 (L) 4.6 - 6.2 M/CU MM    Hemoglobin 8.9 (L) 13.5 - 18.0 GM/DL    Hematocrit 30.3 (L) 42 - 52 %    MCV 99.7 78 - 100 FL    MCH 29.3 27 - 31 PG    MCHC 29.4 (L) 32.0 - 36.0 %    RDW 15.5 (H) 11.7 - 14.9 %    Platelets 217 140 - 440 K/CU MM    MPV 11.2 (H) 7.5 - 11.1 FL    Differential Type AUTOMATED DIFFERENTIAL     Segs Relative 61.8 36 - 66 %    Lymphocytes % 18.3 (L) 24 - 44 %    Monocytes % 9.0 (H) 0 - 4 %    Eosinophils % 10.0 (H) 0 - 3 %    Basophils % 0.5 0 - 1 %    Segs Absolute 4.9 K/CU MM    Lymphocytes Absolute 1.5 K/CU MM    Monocytes Absolute 0.7 K/CU MM    Eosinophils Absolute 0.8 K/CU MM    Basophils Absolute 0.0 K/CU MM    Nucleated RBC % 0.0 %    Total Nucleated RBC 0.0 K/CU MM    Total Immature Neutrophil 0.03 K/CU MM    Immature Neutrophil % 0.4 0 - 0.43 %   Comprehensive Metabolic Panel    Collection Time: 03/07/24  8:58 AM   Result Value Ref Range    Sodium 137 135 - 145 MMOL/L    Potassium 5.0 3.5 - 5.1 MMOL/L    Chloride 105 99 - 110 mMol/L    CO2 22 21 - 32

## 2024-03-07 NOTE — H&P
V2.0  History and Physical      Name:  Greg Easton /Age/Sex: 1940  (83 y.o. male)   MRN & CSN:  [id number] & [id number] Encounter Date/Time: 3/7/2024 2:30 PM EST   Location:  [facility] PCP: Jacobo Zazueta MD       Hospital Day: 1    Assessment and Plan:   Greg Easton is a 83 y.o. male with a pmh of constipation, chronic mccrary catheter, CKD, IDDM, who presents with Constipation    Hospital Problems             Last Modified POA    * (Principal) Constipation 3/7/2024 Yes       Severe constipation with fecal impaction and concern for stercoral colitis  -Presents with complaint of not having moved his bowels for the past 4 days.  He reports chronic constipation and is on daily laxatives with docusate and polyethylene glycol.  Does not report any fever, chills, rigors.  Does have lower abdominal discomfort.  -CT abdomen pelvis without contrast done in the emergency shows large amount of residual stool in the large bowel including rectum, fecal impaction could not be excluded; mild wall thickening of the rectum with perirectal fat stranding suggestive of stercoral colitis.  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

## 2024-03-07 NOTE — PROGRESS NOTES
4 Eyes Skin Assessment     NAME:  Greg Easton  YOB: 1940  MEDICAL RECORD NUMBER:  [id number]    The patient is being assessed for  Admission    I agree that at least one RN has performed a thorough Head to Toe Skin Assessment on the patient. ALL assessment sites listed below have been assessed.      Areas assessed by both nurses:    Head, Face, Ears, Shoulders, Back, Chest, Arms, Elbows, Hands, Sacrum. Buttock, Coccyx, Ischium, and Legs. Feet and Heels        Does the Patient have a Wound? No noted wound(s)       Judd Prevention initiated by RN: No  Wound Care Orders initiated by RN: No    Pressure Injury (Stage 3,4, Unstageable, DTI, NWPT, and Complex wounds) if present, place Wound referral order by RN under : No    New Ostomies, if present place, Ostomy referral order under : No     Nurse 1 eSignature: Electronically signed by Juana Mc RN on 3/7/24 at 4:33 PM EST    **SHARE this note so that the co-signing nurse can place an eSignature**    Nurse 2 eSignature: Electronically signed by Stephanie Ordonez RN on 3/7/24 at 4:34 PM EST

## 2024-03-07 NOTE — ED PROVIDER NOTES
EKG interpreted by me  Sinus rhythm with first-degree AV block, rate of 60 bpm, UT interval of 214 ms.  No ST elevation.  Otherwise normal intervals.  No previous to compare.     Milla Meyer MD  03/07/24 0948

## 2024-03-07 NOTE — ED PROVIDER NOTES
breakfast)  Qty: 30 tablet, Refills: 3      !! insulin lispro (HUMALOG) 100 UNIT/ML SOLN injection vial Inject 5 Units into the skin 3 times daily (with meals)  Qty: 10 mL, Refills: 2      Ergocalciferol (VITAMIN D) 74202 units CAPS Take 50,000 Units by mouth once a week for 7 doses  Qty: 5 capsule, Refills: 0      Vitamin D (CHOLECALCIFEROL) 50 MCG (2000 UT) TABS tablet Take 1 tablet by mouth daily  Qty: 60 tablet, Refills: 2    Comments: Labeling may look different.  25 mcg=1000 Units. Please double check dosages.      atorvastatin (LIPITOR) 20 MG tablet Take 1 tablet by mouth nightly  Qty: 30 tablet, Refills: 0      losartan-hydroCHLOROthiazide (HYZAAR) 50-12.5 MG per tablet Take 1 tablet by mouth 2 times daily  Qty: 60 tablet, Refills: 0      miconazole (MICOTIN) 2 % powder Apply topically 2 times daily.  Qty: 45 g, Refills: 0      albuterol sulfate HFA (VENTOLIN HFA) 108 (90 Base) MCG/ACT inhaler Inhale 1 puff into the lungs 4 times daily Indications: shortness of breath      docusate sodium (COLACE) 100 MG capsule Take 1 capsule by mouth 2 times daily as needed for Constipation      polyethylene glycol (GLYCOLAX) 17 GM/SCOOP powder Take 17 g by mouth daily Indications: as needed      amLODIPine (NORVASC) 10 MG tablet Take 1 tablet by mouth daily  Qty: 30 tablet, Refills: 3      carvedilol (COREG) 25 MG tablet Take 1 tablet by mouth 2 times daily      senna (SENOKOT) 8.6 MG tablet Take 2 tablets by mouth daily      !! insulin lispro (HUMALOG) 100 UNIT/ML SOLN injection vial Inject 0-8 Units into the skin 3 times daily (with meals)  No Insulin  200-249 2 Units  250-299 4 Units  300-349 6 Units  Over 349 8 Units and notify physician  Qty: 1 each, Refills: 0      omeprazole (PRILOSEC) 20 MG delayed release capsule Take 2 capsules by mouth daily      levothyroxine (SYNTHROID) 75 MCG tablet Take 1 tablet by mouth Daily      aspirin 81 MG chewable tablet Take 1 tablet by mouth daily  Qty: 30 tablet, Refills: 3    hydrALAZINE (APRESOLINE) injection 10 mg (has no administration in time range)   labetalol (NORMODYNE;TRANDATE) injection 10 mg (has no administration in time range)   albuterol sulfate HFA (PROVENTIL;VENTOLIN;PROAIR) 108 (90 Base) MCG/ACT inhaler 1 puff (has no administration in time range)   aspirin chewable tablet 81 mg (has no administration in time range)   atorvastatin (LIPITOR) tablet 20 mg (has no administration in time range)   carvedilol (COREG) tablet 25 mg (has no administration in time range)   ferrous sulfate (IRON 325) tablet 325 mg (has no administration in time range)   insulin lispro (HUMALOG) injection vial 0-4 Units (has no administration in time range)   insulin lispro (HUMALOG) injection vial 0-4 Units (has no administration in time range)   glucose chewable tablet 16 g (has no administration in time range)   dextrose bolus 10% 125 mL (has no administration in time range)     Or   dextrose bolus 10% 250 mL (has no administration in time range)   glucagon injection 1 mg (has no administration in time range)   dextrose 10 % infusion (has no administration in time range)   levothyroxine (SYNTHROID) tablet 75 mcg (has no administration in time range)   pantoprazole (PROTONIX) tablet 40 mg (has no administration in time range)   sodium chloride flush 0.9 % injection 5-40 mL (has no administration in time range)   sodium chloride flush 0.9 % injection 5-40 mL (has no administration in time range)   0.9 % sodium chloride infusion (has no administration in time range)   enoxaparin Sodium (LOVENOX) injection 30 mg (has no administration in time range)   ondansetron (ZOFRAN-ODT) disintegrating tablet 4 mg (has no administration in time range)     Or   ondansetron (ZOFRAN) injection 4 mg (has no administration in time range)   polyethylene glycol (GLYCOLAX) packet 17 g (has no administration in time range)   acetaminophen (TYLENOL) tablet 650 mg (has no administration in time range)     Or

## 2024-03-08 ENCOUNTER — APPOINTMENT (OUTPATIENT)
Dept: GENERAL RADIOLOGY | Age: 84
DRG: 392 | End: 2024-03-08
Payer: MEDICARE

## 2024-03-08 LAB
ALBUMIN SERPL-MCNC: 3.3 GM/DL (ref 3.4–5)
ALP BLD-CCNC: 87 IU/L (ref 40–129)
ALT SERPL-CCNC: 9 U/L (ref 10–40)
ANION GAP SERPL CALCULATED.3IONS-SCNC: 9 MMOL/L (ref 7–16)
AST SERPL-CCNC: 12 IU/L (ref 15–37)
BASOPHILS ABSOLUTE: 0 K/CU MM
BASOPHILS RELATIVE PERCENT: 0.5 % (ref 0–1)
BILIRUB SERPL-MCNC: 0.2 MG/DL (ref 0–1)
BUN SERPL-MCNC: 35 MG/DL (ref 6–23)
CALCIUM SERPL-MCNC: 8.2 MG/DL (ref 8.3–10.6)
CHLORIDE BLD-SCNC: 110 MMOL/L (ref 99–110)
CO2: 21 MMOL/L (ref 21–32)
CREAT SERPL-MCNC: 2.2 MG/DL (ref 0.9–1.3)
CULTURE: NORMAL
DIFFERENTIAL TYPE: ABNORMAL
EOSINOPHILS ABSOLUTE: 0.7 K/CU MM
EOSINOPHILS RELATIVE PERCENT: 8 % (ref 0–3)
GFR SERPL CREATININE-BSD FRML MDRD: 29 ML/MIN/1.73M2
GLUCOSE BLD-MCNC: 157 MG/DL (ref 70–99)
GLUCOSE BLD-MCNC: 163 MG/DL (ref 70–99)
GLUCOSE BLD-MCNC: 230 MG/DL (ref 70–99)
GLUCOSE BLD-MCNC: 252 MG/DL (ref 70–99)
GLUCOSE SERPL-MCNC: 140 MG/DL (ref 70–99)
HCT VFR BLD CALC: 26.3 % (ref 42–52)
HEMOGLOBIN: 8.1 GM/DL (ref 13.5–18)
IMMATURE NEUTROPHIL %: 0.2 % (ref 0–0.43)
LYMPHOCYTES ABSOLUTE: 1.4 K/CU MM
LYMPHOCYTES RELATIVE PERCENT: 16.9 % (ref 24–44)
Lab: NORMAL
MAGNESIUM: 1.7 MG/DL (ref 1.8–2.4)
MCH RBC QN AUTO: 29.5 PG (ref 27–31)
MCHC RBC AUTO-ENTMCNC: 30.8 % (ref 32–36)
MCV RBC AUTO: 95.6 FL (ref 78–100)
MONOCYTES ABSOLUTE: 0.8 K/CU MM
MONOCYTES RELATIVE PERCENT: 10.1 % (ref 0–4)
NUCLEATED RBC %: 0 %
PDW BLD-RTO: 15.3 % (ref 11.7–14.9)
PHOSPHORUS: 3.3 MG/DL (ref 2.5–4.9)
PLATELET # BLD: 201 K/CU MM (ref 140–440)
PMV BLD AUTO: 11 FL (ref 7.5–11.1)
POTASSIUM SERPL-SCNC: 4.5 MMOL/L (ref 3.5–5.1)
RBC # BLD: 2.75 M/CU MM (ref 4.6–6.2)
SEGMENTED NEUTROPHILS ABSOLUTE COUNT: 5.4 K/CU MM
SEGMENTED NEUTROPHILS RELATIVE PERCENT: 64.3 % (ref 36–66)
SODIUM BLD-SCNC: 140 MMOL/L (ref 135–145)
SPECIMEN: NORMAL
TOTAL IMMATURE NEUTOROPHIL: 0.02 K/CU MM
TOTAL NUCLEATED RBC: 0 K/CU MM
TOTAL PROTEIN: 6 GM/DL (ref 6.4–8.2)
WBC # BLD: 8.4 K/CU MM (ref 4–10.5)

## 2024-03-08 PROCEDURE — 82962 GLUCOSE BLOOD TEST: CPT

## 2024-03-08 PROCEDURE — 6370000000 HC RX 637 (ALT 250 FOR IP): Performed by: INTERNAL MEDICINE

## 2024-03-08 PROCEDURE — 80053 COMPREHEN METABOLIC PANEL: CPT

## 2024-03-08 PROCEDURE — 2580000003 HC RX 258: Performed by: LICENSED PRACTICAL NURSE

## 2024-03-08 PROCEDURE — 85025 COMPLETE CBC W/AUTO DIFF WBC: CPT

## 2024-03-08 PROCEDURE — 1200000000 HC SEMI PRIVATE

## 2024-03-08 PROCEDURE — APPNB45 APP NON BILLABLE 31-45 MINUTES: Performed by: LICENSED PRACTICAL NURSE

## 2024-03-08 PROCEDURE — 74018 RADEX ABDOMEN 1 VIEW: CPT

## 2024-03-08 PROCEDURE — 6360000002 HC RX W HCPCS: Performed by: INTERNAL MEDICINE

## 2024-03-08 PROCEDURE — 36415 COLL VENOUS BLD VENIPUNCTURE: CPT

## 2024-03-08 PROCEDURE — 84100 ASSAY OF PHOSPHORUS: CPT

## 2024-03-08 PROCEDURE — 80048 BASIC METABOLIC PNL TOTAL CA: CPT

## 2024-03-08 PROCEDURE — 6360000002 HC RX W HCPCS: Performed by: STUDENT IN AN ORGANIZED HEALTH CARE EDUCATION/TRAINING PROGRAM

## 2024-03-08 PROCEDURE — 80076 HEPATIC FUNCTION PANEL: CPT

## 2024-03-08 PROCEDURE — 83735 ASSAY OF MAGNESIUM: CPT

## 2024-03-08 PROCEDURE — 6370000000 HC RX 637 (ALT 250 FOR IP): Performed by: LICENSED PRACTICAL NURSE

## 2024-03-08 PROCEDURE — 94761 N-INVAS EAR/PLS OXIMETRY MLT: CPT

## 2024-03-08 PROCEDURE — 2580000003 HC RX 258: Performed by: INTERNAL MEDICINE

## 2024-03-08 PROCEDURE — 99232 SBSQ HOSP IP/OBS MODERATE 35: CPT | Performed by: INTERNAL MEDICINE

## 2024-03-08 PROCEDURE — 6370000000 HC RX 637 (ALT 250 FOR IP): Performed by: FAMILY MEDICINE

## 2024-03-08 RX ORDER — MAGNESIUM SULFATE IN WATER 40 MG/ML
2000 INJECTION, SOLUTION INTRAVENOUS ONCE
Status: COMPLETED | OUTPATIENT
Start: 2024-03-08 | End: 2024-03-08

## 2024-03-08 RX ORDER — LANOLIN ALCOHOL/MO/W.PET/CERES
3 CREAM (GRAM) TOPICAL
Status: COMPLETED | OUTPATIENT
Start: 2024-03-08 | End: 2024-03-08

## 2024-03-08 RX ADMIN — METRONIDAZOLE 500 MG: 250 TABLET ORAL at 21:14

## 2024-03-08 RX ADMIN — INSULIN LISPRO 2 UNITS: 100 INJECTION, SOLUTION INTRAVENOUS; SUBCUTANEOUS at 13:27

## 2024-03-08 RX ADMIN — METRONIDAZOLE 500 MG: 250 TABLET ORAL at 05:58

## 2024-03-08 RX ADMIN — POLYETHYLENE GLYCOL (3350) 17 G: 17 POWDER, FOR SOLUTION ORAL at 09:49

## 2024-03-08 RX ADMIN — AMLODIPINE BESYLATE 10 MG: 10 TABLET ORAL at 09:49

## 2024-03-08 RX ADMIN — MAGNESIUM SULFATE HEPTAHYDRATE 2000 MG: 40 INJECTION, SOLUTION INTRAVENOUS at 11:55

## 2024-03-08 RX ADMIN — LEVOTHYROXINE SODIUM 75 MCG: 0.07 TABLET ORAL at 05:58

## 2024-03-08 RX ADMIN — CIPROFLOXACIN 500 MG: 500 TABLET, FILM COATED ORAL at 18:13

## 2024-03-08 RX ADMIN — ENOXAPARIN SODIUM 30 MG: 100 INJECTION SUBCUTANEOUS at 09:50

## 2024-03-08 RX ADMIN — LACTULOSE: 10 SOLUTION ORAL at 16:03

## 2024-03-08 RX ADMIN — SODIUM CHLORIDE, PRESERVATIVE FREE 5 ML: 5 INJECTION INTRAVENOUS at 21:14

## 2024-03-08 RX ADMIN — ASPIRIN 81 MG: 81 TABLET, CHEWABLE ORAL at 21:13

## 2024-03-08 RX ADMIN — Medication 3 MG: at 02:16

## 2024-03-08 RX ADMIN — PANTOPRAZOLE SODIUM 40 MG: 40 TABLET, DELAYED RELEASE ORAL at 05:58

## 2024-03-08 RX ADMIN — CARVEDILOL 25 MG: 25 TABLET, FILM COATED ORAL at 09:49

## 2024-03-08 RX ADMIN — CARVEDILOL 25 MG: 25 TABLET, FILM COATED ORAL at 18:13

## 2024-03-08 RX ADMIN — METRONIDAZOLE 500 MG: 250 TABLET ORAL at 13:27

## 2024-03-08 RX ADMIN — ATORVASTATIN CALCIUM 20 MG: 10 TABLET, FILM COATED ORAL at 21:13

## 2024-03-08 RX ADMIN — ACETAMINOPHEN 650 MG: 325 TABLET ORAL at 05:58

## 2024-03-08 ASSESSMENT — PAIN SCALES - GENERAL
PAINLEVEL_OUTOF10: 8
PAINLEVEL_OUTOF10: 9
PAINLEVEL_OUTOF10: 9

## 2024-03-08 ASSESSMENT — PAIN - FUNCTIONAL ASSESSMENT: PAIN_FUNCTIONAL_ASSESSMENT: ACTIVITIES ARE NOT PREVENTED

## 2024-03-08 ASSESSMENT — PAIN DESCRIPTION - DESCRIPTORS: DESCRIPTORS: ACHING;CRAMPING;DISCOMFORT

## 2024-03-08 ASSESSMENT — PAIN DESCRIPTION - ORIENTATION: ORIENTATION: RIGHT;LEFT;LOWER;MID

## 2024-03-08 ASSESSMENT — PAIN DESCRIPTION - LOCATION: LOCATION: ABDOMEN

## 2024-03-08 NOTE — PLAN OF CARE
Problem: Discharge Planning  Goal: Discharge to home or other facility with appropriate resources  3/8/2024 1253 by Kandice Monroe RN  Outcome: Progressing  3/8/2024 0658 by Cristian Corona LPN  Outcome: Progressing     Problem: Safety - Adult  Goal: Free from fall injury  3/8/2024 1253 by Kandice Monroe RN  Outcome: Progressing  3/8/2024 0658 by Cristian Corona LPN  Outcome: Progressing     Problem: ABCDS Injury Assessment  Goal: Absence of physical injury  3/8/2024 1253 by Kandice Monroe RN  Outcome: Progressing  3/8/2024 0658 by Cristian Corona LPN  Outcome: Progressing     Problem: Pain  Goal: Verbalizes/displays adequate comfort level or baseline comfort level  Outcome: Progressing

## 2024-03-08 NOTE — PROGRESS NOTES
no distress.   Eyes: Pupils equal, round.  No icterus.  HENT: Oral mucosa is moist.   Pulmonary: No accessory muscle use. No localizing pulmonary findings.     Cardiovascular: Heart has a regular rate and rhythm, no JVD.   Gastrointestinal: Bowel sounds are present in all four quadrants. Abdomen is soft, lower left quadrant and right lower quadrant tender, nondistended. Rectal disimpaction preformed. Stool is soft and at rectal vault. Removed small amount of soft stool.    Skin: No jaundice, spider angiomas, or palmar erythema. No purpura.  Neuro: Awake,alert, and oriented x3. No gross focal neurologic deficits.       Chart and labs reviewed.    MPRESSION/RECOMMENDATIONS:  1) Severe constipation, with Rectal stool impaction, with large amount of residual stool in large bowel, and mild wall thickening of the rectum with perirectal fat stranding, concern for sterocoral colitis as seen on CT  -manually disimpacted the patient myself, with a small amount of soft non bloody stool  -afebrile, wbc normal   -will start on cipro and flagyl for possible stercoral colitis coverage- ordered  -lactulose enema now  -ordered 2,000ml GoLytely continue to finish  -ordered oral lactulose dose  -Ordered KUB daily for x three days  -patient to get up to toilet to have bowel movements  -will need aggressive outpatient management, considering linzess and miralax     2) Chronic mccrary catheter usage- will defer to hospital medicine and appreciate recommendations     3)a  3 mm nonobstructive calculus left renal pelvis.- will defer to hospital medicine and appreciate recommendations  4) HTN     5) PAMELLA on CKD will defer to nephrology and appreciate recommendations     6) mixed hyperlipidemia will defer to hospital medicine and appreciate recommendation     7) diabetes, on insulin will defer to hospital medicine appreciate recommendation     Patient's history, exam, and plan of care were discussed with the patient and Dr. Way  . All questions

## 2024-03-08 NOTE — PROGRESS NOTES
Comprehensive Nutrition Assessment    Type and Reason for Visit:  Initial, Positive Nutrition Screen (MST; 2)    Nutrition Recommendations/Plan:   Trial Carb Control 5, Low Potassium Diet   Start diabetic vanilla oral nutrition supplement daily   Continue BM regimen as medically able   Please document all PO intakes in I/O      Malnutrition Assessment:  Malnutrition Status:  At risk for malnutrition (Comment) (03/08/24 1351)    Context:  Acute Illness     Findings of the 6 clinical characteristics of malnutrition:  Energy Intake:  75% or less of estimated energy requirements for 7 or more days (over the past 5 days)  Weight Loss:   (10% in 7 mos, not clinically significant, mild wt loss)     Body Fat Loss:  Unable to assess     Muscle Mass Loss:  Unable to assess    Fluid Accumulation:  Unable to assess Extremities   Strength:  Not Performed    Nutrition Assessment:    Admitted with Constipation. PMH: DMII, CKD, Prostate Ca s/p resection and chemo. Currently on regular diet, consumed oatmeal, mandarin oranges, grapes, and canteloupe. Stated UBW of 192 lb, c/o unintentional weight loss x 1 week RT reduced oral intake from ab pain, nausea, and constipation. Progressive wt loss 10% over 7 months. Pt reports eating beef pot pies, chicken salad, potato salad PTA and likes to drink OJ with meds. Discussed K+ rich foods, encourage to make some dietary swaps. Pt in lots of pain at visit, not appropriate for NFPE. Pt open to trial low K+ diet and supplements dos. Follow as high nutrition risk.    Nutrition Related Findings:    POCT 252, 157, Mg 1.7, K 4.5, GFR 29 Rx: glycolax, synthroid, cipro, enema +stercoral colitis per GI Wound Type: None       Current Nutrition Intake & Therapies:    Average Meal Intake: 26-50% (had oatmeal, mandarin oranges, and other high K+ rich fruit)  Average Supplements Intake: None Ordered  ADULT DIET; Regular; Low Potassium (Less than 3000 mg/day); send vanilla  ADULT ORAL NUTRITION  SUPPLEMENT; Lunch; Diabetic Oral Supplement    Anthropometric Measures:  Height: 172.7 cm (5' 8\")  Ideal Body Weight (IBW): 154 lbs (70 kg)    Admission Body Weight: 81.9 kg (180 lb 8.9 oz)  Current Body Weight: 81.9 kg (180 lb 8.9 oz), 117.2 % IBW. Weight Source: Bed Scale  Current BMI (kg/m2): 27.5  Usual Body Weight: 90.5 kg (199 lb 9.6 oz) (7/2023 office visit)  % Weight Change (Calculated): -9.5  Weight Adjustment For: No Adjustment                 BMI Categories: Overweight (BMI 25.0-29.9)    Estimated Daily Nutrient Needs:  Energy Requirements Based On: Formula  Weight Used for Energy Requirements: Current  Energy (kcal/day): 6268-8360 (MSJ)  Weight Used for Protein Requirements: Ideal  Protein (g/day): 70-84 (1-1.2 g/kg)  Method Used for Fluid Requirements: Standard Renal  Fluid (ml/day): per MD    Nutrition Diagnosis:   Inadequate protein-energy intake related to acute injury/trauma, pain, altered GI function, altered GI structure as evidenced by poor intake prior to admission, weight loss, constipation    Nutrition Interventions:   Food and/or Nutrient Delivery: Modify Current Diet, Start Oral Nutrition Supplement  Nutrition Education/Counseling: Education initiated (sending handouts, reviewed with pt verbally at first visit)  Coordination of Nutrition Care: Continue to monitor while inpatient, Coordination of Care  Plan of Care discussed with: pt    Goals:     Goals: PO intake 50% or greater       Nutrition Monitoring and Evaluation:   Behavioral-Environmental Outcomes: None Identified  Food/Nutrient Intake Outcomes: Food and Nutrient Intake, Supplement Intake  Physical Signs/Symptoms Outcomes: Biochemical Data, Constipation, Meal Time Behavior, Weight, Nutrition Focused Physical Findings    Discharge Planning:    Too soon to determine     Joshua Stubbs RD, LD  Contact: [phone]

## 2024-03-08 NOTE — CARE COORDINATION
03/08/24 1542   Service Assessment   Patient Orientation Alert and Oriented   Cognition Alert   History Provided By Patient;Medical Record   Primary Caregiver Private caregiver  (self and has a private caregiver 3 times per week.)   Support Systems Children  (3 sons, 2 are local)   PCP Verified by CM Yes   Last Visit to PCP Within last year   Prior Functional Level Assistance with the following:;Bathing   Current Functional Level Bathing;Assistance with the following:   Can patient return to prior living arrangement Yes   Ability to make needs known: Good   Family able to assist with home care needs: Yes   Would you like for me to discuss the discharge plan with any other family members/significant others, and if so, who? Yes  (sons)   Social/Functional History   Type of Home House   Home Equipment Rollator   Active  No   Patient's  Info son   Mode of Transportation Car   Condition of Participation: Discharge Planning   The Patient and/Or Patient Representative agree with the Discharge Plan? Yes     Reviewed chart, discussed in IDR and spoke with pt /son about discharge needs/plans.   Pt  uses a Rollator for mobility.  He has a private care giver that he privately pays for 3 time/week.  At discharge his plan is home and denies need for HC or any other DME at this time.  Pt very painful.  Updated ISHMAEL Woodson.

## 2024-03-08 NOTE — PROGRESS NOTES
[place]2.0    [facility] [place]      Name:  Greg Easton /Age/Sex: 1940  (83 y.o. male)   MRN & CSN:  [id number] & [id number] Encounter Date/Time: 3/8/2024 10:34 AM EST   Location:  [address] [place] [address] PCP: Jacobo Zazueta MD     Attending:Philip Lerner MD       Hospital Day: 2    Assessment and Recommendations   Greg Easton is a 83 y.o. male who presents with Constipation      Plan:     Severe constipation with fecal impaction and concern for stercoral colitis  -Presents with complaint of not having moved his bowels for the past 4 days.  He reports chronic constipation and is on daily laxatives with docusate and polyethylene glycol.  Does not report any fever, chills, rigors.  Does have lower abdominal discomfort.  -CT abdomen pelvis without contrast done in the emergency shows large amount of residual stool in the large bowel including rectum, fecal impaction could not be excluded; mild wall thickening of the rectum with perirectal fat stranding suggestive of stercoral colitis.  -GI team were consulted from the ED was disimpacted in the ER by GI team.  This morning still no bowel movement.  On repeat TANJA by GI this morning patient has soft stool.  Recommended lactulose enema.  Continue Cipro Flagyl for 7 days due to stercoral colitis as per GI.  -Finish GoLytely.  Will need to be his placed on extensive bowel regimen prior to discharge.     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  Underwent IV fluid resuscitation now stopped.  Recheck labs tomorrow morning.  IDDM, continue on low-dose sliding scale only for now, adjust insulin regimen based on insulin requirement, POCT glucose checks, hypoglycemia treatment orders  Mixed hyperlipidemia, continue  0.2 0.2   ALKPHOS 97 87     Lipids:   Lab Results   Component Value Date/Time    CHOL 171 05/30/2023 06:48 AM    HDL 43 05/30/2023 06:48 AM    TRIG 129 05/30/2023 06:48 AM     Hemoglobin A1C:   Lab Results   Component Value Date/Time    LABA1C 6.3 02/02/2024 02:55 AM       Troponin:   Lab Results   Component Value Date/Time    TROPONINT 0.038 05/21/2023 07:14 PM    TROPONINT 0.044 10/24/2022 12:20 PM    TROPONINT 0.030 04/20/2022 07:30 AM     UA:  Lab Results   Component Value Date/Time    NITRU NEGATIVE 03/07/2024 09:32 AM    NITRU NEGATIVE 03/23/2013 02:24 PM    COLORU YELLOW 03/07/2024 09:32 AM    WBCUA 567 03/07/2024 09:32 AM    RBCUA 69 03/07/2024 09:32 AM    MUCUS OCCASIONAL 03/07/2024 09:32 AM    TRICHOMONAS NONE SEEN 03/07/2024 09:32 AM    YEAST FEW 04/06/2022 04:45 PM    BACTERIA OCCASIONAL 03/07/2024 09:32 AM    CLARITYU CLOUDY 03/07/2024 09:32 AM    SPECGRAV 1.015 03/07/2024 09:32 AM    LEUKOCYTESUR LARGE NUMBER OR AMOUNT OBSERVED 03/07/2024 09:32 AM    UROBILINOGEN 0.2 03/07/2024 09:32 AM    BILIRUBINUR NEGATIVE 03/07/2024 09:32 AM    BLOODU MODERATE NUMBER OR AMOUNT OBSERVED 03/07/2024 09:32 AM    GLUCOSEU 1,000 03/23/2013 02:24 PM    KETUA NEGATIVE 03/07/2024 09:32 AM    AMORPHOUS RARE 11/16/2021 12:28 PM       Organism:   Lab Results   Component Value Date/Time    ORG ENTBC 02/11/2018 04:35 AM         Electronically signed by Philip Lerner MD on 3/8/2024 at 10:34 AM

## 2024-03-08 NOTE — PROGRESS NOTES
Nephrology Progress Note  3/8/2024 8:38 AM        Subjective:   Admit Date: 3/7/2024  PCP: Jacobo Zazueta MD    Interval History: Patient seen in early morning, still no good bowel movement    Diet: Minimal    ROS: Abdominal discomfort and constipation  Urine output recorded only 350 cc likely not accurately recorded  No fever and acceptable blood pressure now but may need manual confirmation    Data:     Current meds:    amLODIPine  10 mg Oral Daily    aspirin  81 mg Oral Nightly    atorvastatin  20 mg Oral Nightly    carvedilol  25 mg Oral BID WC    insulin lispro  0-4 Units SubCUTAneous TID WC    insulin lispro  0-4 Units SubCUTAneous Nightly    levothyroxine  75 mcg Oral Daily    pantoprazole  40 mg Oral QAM AC    sodium chloride flush  5-40 mL IntraVENous 2 times per day    enoxaparin  30 mg SubCUTAneous Daily    metroNIDAZOLE  500 mg Oral 3 times per day    ciprofloxacin  500 mg Oral Daily    polyethylene glycol  17 g Oral Daily      dextrose      sodium chloride      sodium chloride 75 mL/hr at 03/07/24 1607         I/O last 3 completed shifts:  In: 2802 [P.O.:2792; I.V.:10]  Out: 350 [Urine:350]    CBC:   Recent Labs     03/07/24  0858 03/08/24  0358   WBC 7.9 8.4   HGB 8.9* 8.1*    201          Recent Labs     03/07/24  0858 03/08/24  0358    140   K 5.0 4.5    110   CO2 22 21   BUN 44* 35*   CREATININE 2.6* 2.2*   GLUCOSE 198* 140*       Lab Results   Component Value Date    CALCIUM 8.2 (L) 03/08/2024    PHOS 3.3 03/08/2024       Objective:     Vitals: BP (!) 156/67   Pulse 66   Temp 98.4 °F (36.9 °C)   Resp 12   Ht 1.727 m (5' 8\")   Wt 81.9 kg (180 lb 8.9 oz)   SpO2 97%   BMI 27.45 kg/m² ,    General appearance: Alert, awake and oriented  HEENT: At least 2+ conjunctival pallor  Neck: Supple  Lungs: No gross crackles  Heart: Seemed regular rate and rhythm  Abdomen: Distended, soft, mild tenderness, positive bowel sound also has suprapubic catheter  Extremities: Trace

## 2024-03-09 ENCOUNTER — APPOINTMENT (OUTPATIENT)
Dept: GENERAL RADIOLOGY | Age: 84
DRG: 392 | End: 2024-03-09
Payer: MEDICARE

## 2024-03-09 LAB
ALBUMIN SERPL-MCNC: 3 GM/DL (ref 3.4–5)
ALP BLD-CCNC: 86 IU/L (ref 40–128)
ALT SERPL-CCNC: 6 U/L (ref 10–40)
ANION GAP SERPL CALCULATED.3IONS-SCNC: 10 MMOL/L (ref 7–16)
AST SERPL-CCNC: 10 IU/L (ref 15–37)
BASOPHILS ABSOLUTE: 0 K/CU MM
BASOPHILS RELATIVE PERCENT: 0.4 % (ref 0–1)
BILIRUB SERPL-MCNC: 0.2 MG/DL (ref 0–1)
BUN SERPL-MCNC: 29 MG/DL (ref 6–23)
CALCIUM SERPL-MCNC: 8.3 MG/DL (ref 8.3–10.6)
CHLORIDE BLD-SCNC: 109 MMOL/L (ref 99–110)
CO2: 20 MMOL/L (ref 21–32)
CREAT SERPL-MCNC: 2.2 MG/DL (ref 0.9–1.3)
DIFFERENTIAL TYPE: ABNORMAL
EOSINOPHILS ABSOLUTE: 0.6 K/CU MM
EOSINOPHILS RELATIVE PERCENT: 7.9 % (ref 0–3)
GFR SERPL CREATININE-BSD FRML MDRD: 29 ML/MIN/1.73M2
GLUCOSE BLD-MCNC: 146 MG/DL (ref 70–99)
GLUCOSE BLD-MCNC: 209 MG/DL (ref 70–99)
GLUCOSE BLD-MCNC: 238 MG/DL (ref 70–99)
GLUCOSE BLD-MCNC: 271 MG/DL (ref 70–99)
GLUCOSE SERPL-MCNC: 134 MG/DL (ref 70–99)
HCT VFR BLD CALC: 26.9 % (ref 42–52)
HEMOGLOBIN: 7.8 GM/DL (ref 13.5–18)
IMMATURE NEUTROPHIL %: 0.4 % (ref 0–0.43)
LYMPHOCYTES ABSOLUTE: 1.5 K/CU MM
LYMPHOCYTES RELATIVE PERCENT: 20.8 % (ref 24–44)
MAGNESIUM: 2.3 MG/DL (ref 1.8–2.4)
MCH RBC QN AUTO: 28.9 PG (ref 27–31)
MCHC RBC AUTO-ENTMCNC: 29 % (ref 32–36)
MCV RBC AUTO: 99.6 FL (ref 78–100)
MONOCYTES ABSOLUTE: 0.7 K/CU MM
MONOCYTES RELATIVE PERCENT: 9.5 % (ref 0–4)
NUCLEATED RBC %: 0 %
PDW BLD-RTO: 15.3 % (ref 11.7–14.9)
PHOSPHORUS: 2.9 MG/DL (ref 2.5–4.9)
PLATELET # BLD: 192 K/CU MM (ref 140–440)
PMV BLD AUTO: 11.2 FL (ref 7.5–11.1)
POTASSIUM SERPL-SCNC: 4.3 MMOL/L (ref 3.5–5.1)
RBC # BLD: 2.7 M/CU MM (ref 4.6–6.2)
SEGMENTED NEUTROPHILS ABSOLUTE COUNT: 4.4 K/CU MM
SEGMENTED NEUTROPHILS RELATIVE PERCENT: 61 % (ref 36–66)
SODIUM BLD-SCNC: 139 MMOL/L (ref 135–145)
TOTAL IMMATURE NEUTOROPHIL: 0.03 K/CU MM
TOTAL NUCLEATED RBC: 0 K/CU MM
TOTAL PROTEIN: 5.6 GM/DL (ref 6.4–8.2)
WBC # BLD: 7.3 K/CU MM (ref 4–10.5)

## 2024-03-09 PROCEDURE — 6370000000 HC RX 637 (ALT 250 FOR IP): Performed by: LICENSED PRACTICAL NURSE

## 2024-03-09 PROCEDURE — 6360000002 HC RX W HCPCS: Performed by: INTERNAL MEDICINE

## 2024-03-09 PROCEDURE — 6370000000 HC RX 637 (ALT 250 FOR IP): Performed by: INTERNAL MEDICINE

## 2024-03-09 PROCEDURE — 1200000000 HC SEMI PRIVATE

## 2024-03-09 PROCEDURE — 82962 GLUCOSE BLOOD TEST: CPT

## 2024-03-09 PROCEDURE — 84100 ASSAY OF PHOSPHORUS: CPT

## 2024-03-09 PROCEDURE — APPNB30 APP NON BILLABLE TIME 0-30 MINS: Performed by: LICENSED PRACTICAL NURSE

## 2024-03-09 PROCEDURE — 83735 ASSAY OF MAGNESIUM: CPT

## 2024-03-09 PROCEDURE — 94761 N-INVAS EAR/PLS OXIMETRY MLT: CPT

## 2024-03-09 PROCEDURE — 99232 SBSQ HOSP IP/OBS MODERATE 35: CPT | Performed by: INTERNAL MEDICINE

## 2024-03-09 PROCEDURE — 85025 COMPLETE CBC W/AUTO DIFF WBC: CPT

## 2024-03-09 PROCEDURE — 36415 COLL VENOUS BLD VENIPUNCTURE: CPT

## 2024-03-09 PROCEDURE — 6370000000 HC RX 637 (ALT 250 FOR IP): Performed by: FAMILY MEDICINE

## 2024-03-09 PROCEDURE — 80053 COMPREHEN METABOLIC PANEL: CPT

## 2024-03-09 PROCEDURE — 2580000003 HC RX 258: Performed by: INTERNAL MEDICINE

## 2024-03-09 PROCEDURE — 74018 RADEX ABDOMEN 1 VIEW: CPT

## 2024-03-09 RX ADMIN — INSULIN LISPRO 2 UNITS: 100 INJECTION, SOLUTION INTRAVENOUS; SUBCUTANEOUS at 12:20

## 2024-03-09 RX ADMIN — METRONIDAZOLE 500 MG: 250 TABLET ORAL at 06:03

## 2024-03-09 RX ADMIN — LEVOTHYROXINE SODIUM 75 MCG: 0.07 TABLET ORAL at 06:03

## 2024-03-09 RX ADMIN — ATORVASTATIN CALCIUM 20 MG: 10 TABLET, FILM COATED ORAL at 20:29

## 2024-03-09 RX ADMIN — CARVEDILOL 25 MG: 25 TABLET, FILM COATED ORAL at 18:39

## 2024-03-09 RX ADMIN — PANTOPRAZOLE SODIUM 40 MG: 40 TABLET, DELAYED RELEASE ORAL at 06:03

## 2024-03-09 RX ADMIN — METRONIDAZOLE 500 MG: 250 TABLET ORAL at 21:47

## 2024-03-09 RX ADMIN — AMLODIPINE BESYLATE 10 MG: 10 TABLET ORAL at 09:30

## 2024-03-09 RX ADMIN — CIPROFLOXACIN 500 MG: 500 TABLET, FILM COATED ORAL at 18:39

## 2024-03-09 RX ADMIN — INSULIN LISPRO 2 UNITS: 100 INJECTION, SOLUTION INTRAVENOUS; SUBCUTANEOUS at 18:40

## 2024-03-09 RX ADMIN — SODIUM CHLORIDE, PRESERVATIVE FREE 10 ML: 5 INJECTION INTRAVENOUS at 09:31

## 2024-03-09 RX ADMIN — METRONIDAZOLE 500 MG: 250 TABLET ORAL at 15:19

## 2024-03-09 RX ADMIN — POLYETHYLENE GLYCOL (3350) 17 G: 17 POWDER, FOR SOLUTION ORAL at 09:30

## 2024-03-09 RX ADMIN — ASPIRIN 81 MG: 81 TABLET, CHEWABLE ORAL at 20:29

## 2024-03-09 RX ADMIN — CARVEDILOL 25 MG: 25 TABLET, FILM COATED ORAL at 09:30

## 2024-03-09 RX ADMIN — ENOXAPARIN SODIUM 30 MG: 100 INJECTION SUBCUTANEOUS at 09:30

## 2024-03-09 RX ADMIN — SODIUM CHLORIDE, PRESERVATIVE FREE 10 ML: 5 INJECTION INTRAVENOUS at 20:29

## 2024-03-09 NOTE — PROGRESS NOTES
03/08/24  0358 03/09/24  0622   AST 15 12* 10*   ALT 11 9* 6*   ALKPHOS 97 87 86   BILITOT 0.2 0.2 0.2

## 2024-03-09 NOTE — PROGRESS NOTES
V2.0    [facility] [place]      Name:  Greg Easton /Age/Sex: 1940  (83 y.o. male)   MRN & CSN:  [id number] & [id number] Encounter Date/Time: 3/9/2024 10:34 AM EST   Location:  [address] [place] [address] PCP: Jacobo Zazueta MD     Attending:Philip Lerner MD       Hospital Day: 3    Assessment and Recommendations   Greg Easton is a 83 y.o. male who presents with Constipation      Plan:     Severe constipation with fecal impaction and concern for stercoral colitis  -Presents with complaint of not having moved his bowels for the past 4 days.  He reports chronic constipation and is on daily laxatives with docusate and polyethylene glycol.  Does not report any fever, chills, rigors.  Does have lower abdominal discomfort.  -CT abdomen pelvis without contrast done in the emergency shows large amount of residual stool in the large bowel including rectum, fecal impaction could not be excluded; mild wall thickening of the rectum with perirectal fat stranding suggestive of stercoral colitis.  -GI team were consulted from the ED was disimpacted in the ER by GI team.  This morning still no bowel movement.  On repeat TANJA by GI this morning patient has soft stool.  Recommended lactulose enema.  Continue Cipro Flagyl for 7 days due to stercoral colitis as per GI.  -Given bowel movement overnight repeat KUB to evaluate for stool burden again as per GI.  Will establish on extensive stool regimen prior to discharge.     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  Underwent IV fluid resuscitation now stopped.  Recheck labs tomorrow morning.  IDDM, continue on low-dose sliding scale only for now, adjust insulin regimen based on insulin requirement, POCT glucose checks,  03/08/24  0358 03/09/24  0622   AST 15 12* 10*   ALT 11 9* 6*   BILITOT 0.2 0.2 0.2   ALKPHOS 97 87 86       Lipids:   Lab Results   Component Value Date/Time    CHOL 171 05/30/2023 06:48 AM    HDL 43 05/30/2023 06:48 AM    TRIG 129 05/30/2023 06:48 AM     Hemoglobin A1C:   Lab Results   Component Value Date/Time    LABA1C 6.3 02/02/2024 02:55 AM       Troponin:   Lab Results   Component Value Date/Time    TROPONINT 0.038 05/21/2023 07:14 PM    TROPONINT 0.044 10/24/2022 12:20 PM    TROPONINT 0.030 04/20/2022 07:30 AM     UA:  Lab Results   Component Value Date/Time    NITRU NEGATIVE 03/07/2024 09:32 AM    NITRU NEGATIVE 03/23/2013 02:24 PM    COLORU YELLOW 03/07/2024 09:32 AM    WBCUA 567 03/07/2024 09:32 AM    RBCUA 69 03/07/2024 09:32 AM    MUCUS OCCASIONAL 03/07/2024 09:32 AM    TRICHOMONAS NONE SEEN 03/07/2024 09:32 AM    YEAST FEW 04/06/2022 04:45 PM    BACTERIA OCCASIONAL 03/07/2024 09:32 AM    CLARITYU CLOUDY 03/07/2024 09:32 AM    SPECGRAV 1.015 03/07/2024 09:32 AM    LEUKOCYTESUR LARGE NUMBER OR AMOUNT OBSERVED 03/07/2024 09:32 AM    UROBILINOGEN 0.2 03/07/2024 09:32 AM    BILIRUBINUR NEGATIVE 03/07/2024 09:32 AM    BLOODU MODERATE NUMBER OR AMOUNT OBSERVED 03/07/2024 09:32 AM    GLUCOSEU 1,000 03/23/2013 02:24 PM    KETUA NEGATIVE 03/07/2024 09:32 AM    AMORPHOUS RARE 11/16/2021 12:28 PM       Organism:   Lab Results   Component Value Date/Time    ORG ENTBC 02/11/2018 04:35 AM         Electronically signed by Philip Lerner MD on 3/9/2024 at 10:29 AM

## 2024-03-09 NOTE — PROGRESS NOTES
Nephrology Progress Note  3/9/2024 10:27 AM  Subjective:     Interval History: Greg Easton is a 83 y.o. male with doing okay in general no distress        Data:   Scheduled Meds:   amLODIPine  10 mg Oral Daily    aspirin  81 mg Oral Nightly    atorvastatin  20 mg Oral Nightly    carvedilol  25 mg Oral BID WC    insulin lispro  0-4 Units SubCUTAneous TID WC    insulin lispro  0-4 Units SubCUTAneous Nightly    levothyroxine  75 mcg Oral Daily    pantoprazole  40 mg Oral QAM AC    sodium chloride flush  5-40 mL IntraVENous 2 times per day    enoxaparin  30 mg SubCUTAneous Daily    metroNIDAZOLE  500 mg Oral 3 times per day    ciprofloxacin  500 mg Oral Daily    polyethylene glycol  17 g Oral Daily     Continuous Infusions:   dextrose      sodium chloride           CBC   Recent Labs     03/07/24  0858 03/08/24 0358 03/09/24 0622   WBC 7.9 8.4 7.3   HGB 8.9* 8.1* 7.8*   HCT 30.3* 26.3* 26.9*    201 192      BMP   Recent Labs     03/07/24  0858 03/08/24 0358 03/09/24 0622    140 139   K 5.0 4.5 4.3    110 109   CO2 22 21 20*   PHOS  --  3.3 2.9   BUN 44* 35* 29*   CREATININE 2.6* 2.2* 2.2*     Hepatic:   Recent Labs     03/07/24  0858 03/08/24 0358 03/09/24 0622   AST 15 12* 10*   ALT 11 9* 6*   BILITOT 0.2 0.2 0.2   ALKPHOS 97 87 86     Troponin: No results for input(s): \"TROPONINI\" in the last 72 hours.  BNP: No results for input(s): \"BNP\" in the last 72 hours.  Lipids: No results for input(s): \"CHOL\", \"HDL\" in the last 72 hours.    Invalid input(s): \"LDLCALCU\"  ABGs:   Lab Results   Component Value Date/Time    PO2ART 37 10/24/2022 04:15 PM    FWV4XPN 49.0 10/24/2022 04:15 PM     INR: No results for input(s): \"INR\" in the last 72 hours.  Renal Labs  Albumin:    Lab Results   Component Value Date/Time    LABALBU 3.0 03/09/2024 06:22 AM    LABALBU 3.8 12/13/2023 10:12 AM     Calcium:    Lab Results   Component Value Date/Time    CALCIUM 8.3 03/09/2024 06:22 AM     Phosphorus:    Lab Results

## 2024-03-10 ENCOUNTER — APPOINTMENT (OUTPATIENT)
Dept: GENERAL RADIOLOGY | Age: 84
DRG: 392 | End: 2024-03-10
Payer: MEDICARE

## 2024-03-10 VITALS
SYSTOLIC BLOOD PRESSURE: 143 MMHG | TEMPERATURE: 98.1 F | HEIGHT: 68 IN | DIASTOLIC BLOOD PRESSURE: 61 MMHG | WEIGHT: 184.4 LBS | HEART RATE: 61 BPM | OXYGEN SATURATION: 99 % | BODY MASS INDEX: 27.95 KG/M2 | RESPIRATION RATE: 16 BRPM

## 2024-03-10 LAB
ALBUMIN SERPL-MCNC: 3.1 GM/DL (ref 3.4–5)
ANION GAP SERPL CALCULATED.3IONS-SCNC: 13 MMOL/L (ref 7–16)
BASOPHILS ABSOLUTE: 0 K/CU MM
BASOPHILS RELATIVE PERCENT: 0.4 % (ref 0–1)
BUN SERPL-MCNC: 30 MG/DL (ref 6–23)
CALCIUM SERPL-MCNC: 8.3 MG/DL (ref 8.3–10.6)
CHLORIDE BLD-SCNC: 107 MMOL/L (ref 99–110)
CO2: 21 MMOL/L (ref 21–32)
CREAT SERPL-MCNC: 2.2 MG/DL (ref 0.9–1.3)
DIFFERENTIAL TYPE: ABNORMAL
EOSINOPHILS ABSOLUTE: 0.6 K/CU MM
EOSINOPHILS RELATIVE PERCENT: 8.3 % (ref 0–3)
GFR SERPL CREATININE-BSD FRML MDRD: 29 ML/MIN/1.73M2
GLUCOSE BLD-MCNC: 201 MG/DL (ref 70–99)
GLUCOSE BLD-MCNC: 364 MG/DL (ref 70–99)
GLUCOSE SERPL-MCNC: 191 MG/DL (ref 70–99)
HCT VFR BLD CALC: 26.2 % (ref 42–52)
HEMOGLOBIN: 7.8 GM/DL (ref 13.5–18)
IMMATURE NEUTROPHIL %: 0.1 % (ref 0–0.43)
LYMPHOCYTES ABSOLUTE: 1.4 K/CU MM
LYMPHOCYTES RELATIVE PERCENT: 18.9 % (ref 24–44)
MAGNESIUM: 2.1 MG/DL (ref 1.8–2.4)
MCH RBC QN AUTO: 28.8 PG (ref 27–31)
MCHC RBC AUTO-ENTMCNC: 29.8 % (ref 32–36)
MCV RBC AUTO: 96.7 FL (ref 78–100)
MONOCYTES ABSOLUTE: 0.7 K/CU MM
MONOCYTES RELATIVE PERCENT: 9.3 % (ref 0–4)
NUCLEATED RBC %: 0 %
PDW BLD-RTO: 15.5 % (ref 11.7–14.9)
PHOSPHORUS: 3.2 MG/DL (ref 2.5–4.9)
PHOSPHORUS: 3.3 MG/DL (ref 2.5–4.9)
PLATELET # BLD: 197 K/CU MM (ref 140–440)
PMV BLD AUTO: 11.8 FL (ref 7.5–11.1)
POTASSIUM SERPL-SCNC: 4.5 MMOL/L (ref 3.5–5.1)
RBC # BLD: 2.71 M/CU MM (ref 4.6–6.2)
SEGMENTED NEUTROPHILS ABSOLUTE COUNT: 4.6 K/CU MM
SEGMENTED NEUTROPHILS RELATIVE PERCENT: 63 % (ref 36–66)
SODIUM BLD-SCNC: 141 MMOL/L (ref 135–145)
TOTAL IMMATURE NEUTOROPHIL: 0.01 K/CU MM
TOTAL NUCLEATED RBC: 0 K/CU MM
WBC # BLD: 7.3 K/CU MM (ref 4–10.5)

## 2024-03-10 PROCEDURE — 6370000000 HC RX 637 (ALT 250 FOR IP): Performed by: INTERNAL MEDICINE

## 2024-03-10 PROCEDURE — 99232 SBSQ HOSP IP/OBS MODERATE 35: CPT | Performed by: INTERNAL MEDICINE

## 2024-03-10 PROCEDURE — 83735 ASSAY OF MAGNESIUM: CPT

## 2024-03-10 PROCEDURE — 6360000002 HC RX W HCPCS: Performed by: INTERNAL MEDICINE

## 2024-03-10 PROCEDURE — 36415 COLL VENOUS BLD VENIPUNCTURE: CPT

## 2024-03-10 PROCEDURE — 2580000003 HC RX 258: Performed by: INTERNAL MEDICINE

## 2024-03-10 PROCEDURE — 6370000000 HC RX 637 (ALT 250 FOR IP): Performed by: LICENSED PRACTICAL NURSE

## 2024-03-10 PROCEDURE — 80069 RENAL FUNCTION PANEL: CPT

## 2024-03-10 PROCEDURE — 85025 COMPLETE CBC W/AUTO DIFF WBC: CPT

## 2024-03-10 PROCEDURE — 84100 ASSAY OF PHOSPHORUS: CPT

## 2024-03-10 PROCEDURE — 82962 GLUCOSE BLOOD TEST: CPT

## 2024-03-10 PROCEDURE — 74018 RADEX ABDOMEN 1 VIEW: CPT

## 2024-03-10 RX ORDER — POLYETHYLENE GLYCOL 3350 17 G/17G
17 POWDER, FOR SOLUTION ORAL 3 TIMES DAILY
Qty: 1700 G | Refills: 1 | Status: ON HOLD | OUTPATIENT
Start: 2024-03-10

## 2024-03-10 RX ORDER — BISACODYL 10 MG
10 SUPPOSITORY, RECTAL RECTAL DAILY PRN
Status: DISCONTINUED | OUTPATIENT
Start: 2024-03-10 | End: 2024-03-10 | Stop reason: HOSPADM

## 2024-03-10 RX ORDER — METRONIDAZOLE 500 MG/1
500 TABLET ORAL EVERY 8 HOURS SCHEDULED
Qty: 12 TABLET | Refills: 0 | Status: SHIPPED | OUTPATIENT
Start: 2024-03-10 | End: 2024-03-14

## 2024-03-10 RX ORDER — POLYETHYLENE GLYCOL 3350 17 G/17G
17 POWDER, FOR SOLUTION ORAL 3 TIMES DAILY
Status: DISCONTINUED | OUTPATIENT
Start: 2024-03-10 | End: 2024-03-10 | Stop reason: HOSPADM

## 2024-03-10 RX ORDER — CIPROFLOXACIN 500 MG/1
500 TABLET, FILM COATED ORAL
Qty: 4 TABLET | Refills: 0 | Status: SHIPPED | OUTPATIENT
Start: 2024-03-10 | End: 2024-03-14

## 2024-03-10 RX ORDER — BISACODYL 10 MG
10 SUPPOSITORY, RECTAL RECTAL DAILY PRN
Qty: 30 SUPPOSITORY | Refills: 1 | Status: ON HOLD | OUTPATIENT
Start: 2024-03-10

## 2024-03-10 RX ADMIN — METRONIDAZOLE 500 MG: 250 TABLET ORAL at 06:17

## 2024-03-10 RX ADMIN — PANTOPRAZOLE SODIUM 40 MG: 40 TABLET, DELAYED RELEASE ORAL at 06:17

## 2024-03-10 RX ADMIN — INSULIN LISPRO 4 UNITS: 100 INJECTION, SOLUTION INTRAVENOUS; SUBCUTANEOUS at 12:36

## 2024-03-10 RX ADMIN — ENOXAPARIN SODIUM 30 MG: 100 INJECTION SUBCUTANEOUS at 10:18

## 2024-03-10 RX ADMIN — LEVOTHYROXINE SODIUM 75 MCG: 0.07 TABLET ORAL at 06:18

## 2024-03-10 RX ADMIN — SODIUM CHLORIDE, PRESERVATIVE FREE 10 ML: 5 INJECTION INTRAVENOUS at 10:19

## 2024-03-10 RX ADMIN — AMLODIPINE BESYLATE 10 MG: 10 TABLET ORAL at 10:18

## 2024-03-10 RX ADMIN — CARVEDILOL 25 MG: 25 TABLET, FILM COATED ORAL at 12:36

## 2024-03-10 NOTE — PLAN OF CARE
Problem: Discharge Planning  Goal: Discharge to home or other facility with appropriate resources  3/10/2024 1159 by Maira Joseph RN  Outcome: Adequate for Discharge  3/10/2024 0128 by Lexie Gomez LPN  Outcome: Progressing     Problem: Safety - Adult  Goal: Free from fall injury  3/10/2024 1159 by Maira Joseph RN  Outcome: Adequate for Discharge  3/10/2024 0128 by Lexie Gomez LPN  Outcome: Progressing     Problem: ABCDS Injury Assessment  Goal: Absence of physical injury  3/10/2024 1159 by Maira Joseph RN  Outcome: Adequate for Discharge  3/10/2024 0128 by Lexie Gomez LPN  Outcome: Progressing     Problem: Pain  Goal: Verbalizes/displays adequate comfort level or baseline comfort level  3/10/2024 1159 by Maira Joseph RN  Outcome: Adequate for Discharge  3/10/2024 0128 by Lexie Gomez LPN  Outcome: Progressing     Problem: Chronic Conditions and Co-morbidities  Goal: Patient's chronic conditions and co-morbidity symptoms are monitored and maintained or improved  3/10/2024 1159 by Maira Joseph RN  Outcome: Adequate for Discharge  3/10/2024 0128 by Lexie Gomez LPN  Outcome: Progressing

## 2024-03-10 NOTE — PROGRESS NOTES
Nephrology Progress Note  3/10/2024 8:38 AM  Subjective:     Interval History: Greg Easton is a 83 y.o. male in general overall doing okay resting in bed denies any complaints has a persistent Hopper catheter in place and abdominal x-ray shows no worsening of constipation        Data:   Scheduled Meds:   amLODIPine  10 mg Oral Daily    aspirin  81 mg Oral Nightly    atorvastatin  20 mg Oral Nightly    carvedilol  25 mg Oral BID WC    insulin lispro  0-4 Units SubCUTAneous TID WC    insulin lispro  0-4 Units SubCUTAneous Nightly    levothyroxine  75 mcg Oral Daily    pantoprazole  40 mg Oral QAM AC    sodium chloride flush  5-40 mL IntraVENous 2 times per day    enoxaparin  30 mg SubCUTAneous Daily    metroNIDAZOLE  500 mg Oral 3 times per day    ciprofloxacin  500 mg Oral Daily    polyethylene glycol  17 g Oral Daily     Continuous Infusions:   dextrose      sodium chloride           CBC   Recent Labs     03/07/24  0858 03/08/24  0358 03/09/24  0622   WBC 7.9 8.4 7.3   HGB 8.9* 8.1* 7.8*   HCT 30.3* 26.3* 26.9*    201 192      BMP   Recent Labs     03/07/24  0858 03/08/24  0358 03/09/24  0622    140 139   K 5.0 4.5 4.3    110 109   CO2 22 21 20*   PHOS  --  3.3 2.9   BUN 44* 35* 29*   CREATININE 2.6* 2.2* 2.2*     Hepatic:   Recent Labs     03/07/24  0858 03/08/24  0358 03/09/24  0622   AST 15 12* 10*   ALT 11 9* 6*   BILITOT 0.2 0.2 0.2   ALKPHOS 97 87 86     Troponin: No results for input(s): \"TROPONINI\" in the last 72 hours.  BNP: No results for input(s): \"BNP\" in the last 72 hours.  Lipids: No results for input(s): \"CHOL\", \"HDL\" in the last 72 hours.    Invalid input(s): \"LDLCALCU\"  ABGs:   Lab Results   Component Value Date/Time    PO2ART 37 10/24/2022 04:15 PM    WJQ4IEZ 49.0 10/24/2022 04:15 PM     INR: No results for input(s): \"INR\" in the last 72 hours.  Renal Labs  Albumin:    Lab Results   Component Value Date/Time    LABALBU 3.0 03/09/2024 06:22 AM    LABALBU 3.8 12/13/2023 10:12

## 2024-03-10 NOTE — DISCHARGE SUMMARY
V2.0  Discharge Summary    Name:  Greg Easton /Age/Sex: 1940 (83 y.o. male)   Admit Date: 3/7/2024  Discharge Date: 3/10/24    MRN & CSN:  [id number] & [id number] Encounter Date and Time 3/10/24 9:54 AM EDT    Attending:  Philip Lerner MD Discharging Provider: Philip Lerner MD       Hospital Course:     Brief HPI:Greg Easton is a 83 y.o. male with pmh of constipation, chronic mccrary catheter, CKD, IDDM, who presents with complaint of not having moved his bowels for the past 4 days.  He reports chronic constipation and is on daily laxatives with docusate and polyethylene glycol.  Does not report any fever, chills, rigors.  Does have lower abdominal discomfort. CT abdomen pelvis without contrast done in the emergency shows large amount of residual stool in the large bowel including rectum, fecal impaction could not be excluded; mild wall thickening of the rectum with perirectal fat stranding suggestive of stercoral colitis     Brief Problem Based Course:     Severe constipation with fecal impaction and concern for stercoral colitis  -Presents with complaint of not having moved his bowels for the past 4 days.  He reports chronic constipation and is on daily laxatives with docusate and polyethylene glycol.  Does not report any fever, chills, rigors.  Does have lower abdominal discomfort.  -CT abdomen pelvis without contrast done in the emergency shows large amount of residual stool in the large bowel including rectum, fecal impaction could not be excluded; mild wall thickening of the rectum with perirectal fat stranding suggestive of stercoral colitis.  -GI team were consulted from the ED was disimpacted in the ER by GI team.    Continue Cipro Flagyl for 7 days due to stercoral colitis as per GI.  -Had extensive bowel movements with lactulose enema and GoLytely.  -Improvement in KUB stool burden.  Discussed with GI prior to discharge they recommended GlycoLax 3 times daily and Dulcolax suppository  known as: FLAGYL  Take 1 tablet by mouth every 8 hours for 4 days            CHANGE how you take these medications      aspirin 81 MG chewable tablet  Take 1 tablet by mouth daily  What changed: when to take this     polyethylene glycol 17 GM/SCOOP powder  Commonly known as: GLYCOLAX  Take 17 g by mouth in the morning, at noon, and at bedtime Indications: as needed  What changed: when to take this            CONTINUE taking these medications      acetaminophen 325 MG tablet  Commonly known as: Tylenol  Take 2 tablets by mouth every 6 hours as needed for Pain     amLODIPine 10 MG tablet  Commonly known as: NORVASC  Take 1 tablet by mouth daily     atorvastatin 20 MG tablet  Commonly known as: LIPITOR  Take 1 tablet by mouth nightly     carvedilol 25 MG tablet  Commonly known as: COREG     ferrous sulfate 325 (65 Fe) MG tablet  Commonly known as: IRON 325  Take 1 tablet by mouth daily (with breakfast)     Insulin Glargine-yfgn 100 UNIT/ML Sopn     * insulin lispro 100 UNIT/ML Soln injection vial  Commonly known as: HUMALOG  Inject 0-8 Units into the skin 3 times daily (with meals)  No Insulin  200-249 2 Units  250-299 4 Units  300-349 6 Units  Over 349 8 Units and notify physician     * insulin lispro 100 UNIT/ML Soln injection vial  Commonly known as: HUMALOG  Inject 5 Units into the skin 3 times daily (with meals)     levothyroxine 75 MCG tablet  Commonly known as: SYNTHROID     magnesium hydroxide 400 MG/5ML suspension  Commonly known as: MILK OF MAGNESIA     miconazole 2 % powder  Commonly known as: MICOTIN  Apply topically 2 times daily.     omeprazole 20 MG delayed release capsule  Commonly known as: PRILOSEC     senna 8.6 MG tablet  Commonly known as: SENOKOT     sodium zirconium cyclosilicate 10 g Pack oral suspension  Commonly known as: Lokelma  Take 1 packet by mouth three times a week  Start taking on: March 11, 2024     Ventolin  (90 Base) MCG/ACT inhaler  Generic drug: albuterol sulfate HFA

## 2024-03-10 NOTE — DISCHARGE INSTRUCTIONS
As we discussed we have placed you on scheduled medications to help you about was daily.  Please note the dosing below.  Please use a suppository if you have not had bowel movements for 2 days.  Please call the GI doctor in the office to book an appointment within 1 to 2 weeks.  Please finish course of antibiotics.

## 2024-03-10 NOTE — PROGRESS NOTES
[facility]             MD Sallie Noriega MD Carol Christensen, APRN-CNP             [address] [place] [address]             [phone] fax [phone]      Gastroenterology Progress note . 3/10/2024  Reason for consult:   Stool impaction, stercoral colitis          Interval H/P  Patient noted to be lying in bed comfortably.  His rectal pain has completely resolved  He mentions he feels back to baseline  Family present at bedside prior to discharge       Physical Exam  Blood pressure (!) 143/61, pulse 61, temperature 98.1 °F (36.7 °C), temperature source Oral, resp. rate 16, height 1.727 m (5' 8\"), weight 83.6 kg (184 lb 6.4 oz), SpO2 99 %.  Constitutional: Patient is in no distress.   Eyes: Pupils equal, round.  No icterus.  HENT: Oral mucosa is moist.   Pulmonary: No accessory muscle use. No localizing pulmonary findings.     Cardiovascular: Heart has a regular rate and rhythm, no JVD.   Gastrointestinal: Bowel sounds are present in all four quadrants. Abdomen is soft, nontender, nondistended. Rectal examination deferred.   Skin: No jaundice, spider angiomas, or palmar erythema. No purpura.  Neuro: Awake,alert, and oriented x3. No gross focal neurologic deficits.       Chart and labs reviewed.    My assessment and plan reveals   #1 fecal impaction  -Status post digital manual disimpaction by gastroenterology team 3/7/2024  -Repeat x-ray with no bowel obstruction patent, stool burden significantly decreased  -I had a long conversation with the patient and the patient's family today prior to discharge about making sure that he has a good bowel regimen.  I mentioned that he needs to take MiraLAX 2-3 times a day initially and then cut it down to twice a day to regularize his bowel movement.  -Docusate suppositories as rescue.       #2 stercoral colitis  -Likely due to fecal impaction  -Recommend Cipro Flagyl for 7 days

## 2024-03-10 NOTE — PLAN OF CARE
Problem: Discharge Planning  Goal: Discharge to home or other facility with appropriate resources  3/10/2024 0128 by Lexie Gomez LPN  Outcome: Progressing  3/9/2024 1526 by Maira Joseph RN  Outcome: Progressing     Problem: Safety - Adult  Goal: Free from fall injury  3/10/2024 0128 by Lexie Gomez LPN  Outcome: Progressing  3/9/2024 1526 by Maira Joseph RN  Outcome: Progressing     Problem: ABCDS Injury Assessment  Goal: Absence of physical injury  3/10/2024 0128 by Lexie Gomez LPN  Outcome: Progressing  3/9/2024 1526 by Maira Joseph RN  Outcome: Progressing     Problem: Pain  Goal: Verbalizes/displays adequate comfort level or baseline comfort level  3/10/2024 0128 by Lexie Gomez LPN  Outcome: Progressing  3/9/2024 1526 by Maira Joseph RN  Outcome: Progressing     Problem: Chronic Conditions and Co-morbidities  Goal: Patient's chronic conditions and co-morbidity symptoms are monitored and maintained or improved  3/10/2024 0128 by Lexie Gomez LPN  Outcome: Progressing  3/9/2024 1526 by Maira Joseph RN  Outcome: Progressing

## 2024-03-22 ENCOUNTER — OFFICE VISIT (OUTPATIENT)
Dept: GASTROENTEROLOGY | Age: 84
End: 2024-03-22
Payer: MEDICARE

## 2024-03-22 VITALS
OXYGEN SATURATION: 99 % | HEART RATE: 65 BPM | BODY MASS INDEX: 28.31 KG/M2 | DIASTOLIC BLOOD PRESSURE: 84 MMHG | WEIGHT: 186.8 LBS | TEMPERATURE: 97.7 F | HEIGHT: 68 IN | SYSTOLIC BLOOD PRESSURE: 136 MMHG

## 2024-03-22 DIAGNOSIS — K52.89 STERCORAL COLITIS: ICD-10-CM

## 2024-03-22 DIAGNOSIS — K59.09 CHRONIC CONSTIPATION: Primary | ICD-10-CM

## 2024-03-22 DIAGNOSIS — Z87.19 HISTORY OF FECAL IMPACTION: ICD-10-CM

## 2024-03-22 PROCEDURE — 99214 OFFICE O/P EST MOD 30 MIN: CPT | Performed by: NURSE PRACTITIONER

## 2024-03-22 PROCEDURE — 3079F DIAST BP 80-89 MM HG: CPT | Performed by: NURSE PRACTITIONER

## 2024-03-22 PROCEDURE — 1123F ACP DISCUSS/DSCN MKR DOCD: CPT | Performed by: NURSE PRACTITIONER

## 2024-03-22 PROCEDURE — G8417 CALC BMI ABV UP PARAM F/U: HCPCS | Performed by: NURSE PRACTITIONER

## 2024-03-22 PROCEDURE — G8484 FLU IMMUNIZE NO ADMIN: HCPCS | Performed by: NURSE PRACTITIONER

## 2024-03-22 PROCEDURE — 1036F TOBACCO NON-USER: CPT | Performed by: NURSE PRACTITIONER

## 2024-03-22 PROCEDURE — G8427 DOCREV CUR MEDS BY ELIG CLIN: HCPCS | Performed by: NURSE PRACTITIONER

## 2024-03-22 PROCEDURE — 1111F DSCHRG MED/CURRENT MED MERGE: CPT | Performed by: NURSE PRACTITIONER

## 2024-03-22 PROCEDURE — 3075F SYST BP GE 130 - 139MM HG: CPT | Performed by: NURSE PRACTITIONER

## 2024-03-22 RX ORDER — DOCUSATE SODIUM 100 MG/1
100 CAPSULE, LIQUID FILLED ORAL 2 TIMES DAILY
Qty: 60 CAPSULE | Refills: 3 | Status: SHIPPED | OUTPATIENT
Start: 2024-03-22 | End: 2024-04-21

## 2024-03-22 ASSESSMENT — ENCOUNTER SYMPTOMS
SHORTNESS OF BREATH: 0
CONSTIPATION: 1
VOMITING: 0
EYE DISCHARGE: 0
COLOR CHANGE: 0
EYE PAIN: 0
NAUSEA: 0
WHEEZING: 0
BLOOD IN STOOL: 0
BACK PAIN: 0
ABDOMINAL PAIN: 0
DIARRHEA: 0

## 2024-03-22 NOTE — PROGRESS NOTES
No nocturnal awakenings with acid reflux.  No dysphagia or pain with swallowing.  No family history of stomach cancer.  He recalled that his mother may have had colon cancer at age 66.    ROS  Review of Systems   Constitutional:  Positive for fatigue. Negative for appetite change, chills, diaphoresis, fever and unexpected weight change.   HENT:  Negative for ear pain and hearing loss.    Eyes:  Positive for visual disturbance. Negative for pain and discharge.   Respiratory:  Positive for cough (intermitttent). Negative for shortness of breath and wheezing.    Cardiovascular:  Negative for chest pain, palpitations and leg swelling.   Gastrointestinal:  Positive for constipation. Negative for abdominal pain, blood in stool, diarrhea, nausea and vomiting.   Endocrine: Negative for cold intolerance and heat intolerance.   Genitourinary:  Negative for dysuria, frequency, hematuria and urgency.        Suprapubic catheter   Musculoskeletal:  Negative for back pain, myalgias and neck pain.   Skin:  Negative for color change, pallor and rash.   Allergic/Immunologic: Negative for environmental allergies and food allergies.   Neurological:  Negative for dizziness, seizures, weakness and headaches.   Hematological:  Does not bruise/bleed easily.   Psychiatric/Behavioral:  Positive for dysphoric mood. Negative for sleep disturbance. The patient is not nervous/anxious.        Allergies  No Known Allergies    Medications  Current Outpatient Medications   Medication Sig Dispense Refill    sodium zirconium cyclosilicate (LOKELMA) 10 g PACK oral suspension Take 1 packet by mouth three times a week 30 each 0    bisacodyl (DULCOLAX) 10 MG suppository Place 1 suppository rectally daily as needed for Other (If no BM for 2 days) 30 suppository 1    polyethylene glycol (GLYCOLAX) 17 GM/SCOOP powder Take 17 g by mouth in the morning, at noon, and at bedtime Indications: as needed 1700 g 1    Insulin Glargine-yfgn 100 UNIT/ML SOPN Inject

## 2024-04-16 ENCOUNTER — APPOINTMENT (OUTPATIENT)
Dept: CT IMAGING | Age: 84
End: 2024-04-16
Payer: MEDICARE

## 2024-04-16 ENCOUNTER — HOSPITAL ENCOUNTER (EMERGENCY)
Age: 84
Discharge: HOME | End: 2024-04-16
Payer: MEDICARE

## 2024-04-16 VITALS
RESPIRATION RATE: 15 BRPM | TEMPERATURE: 97.6 F | OXYGEN SATURATION: 100 % | HEART RATE: 68 BPM | SYSTOLIC BLOOD PRESSURE: 144 MMHG | DIASTOLIC BLOOD PRESSURE: 122 MMHG

## 2024-04-16 DIAGNOSIS — R51.9 ACUTE NONINTRACTABLE HEADACHE, UNSPECIFIED HEADACHE TYPE: Primary | ICD-10-CM

## 2024-04-16 DIAGNOSIS — M54.2 CERVICALGIA: ICD-10-CM

## 2024-04-16 LAB
ALBUMIN SERPL-MCNC: 3.6 GM/DL (ref 3.4–5)
ALP BLD-CCNC: 92 IU/L (ref 40–129)
ALT SERPL-CCNC: 9 U/L (ref 10–40)
ANION GAP SERPL CALCULATED.3IONS-SCNC: 12 MMOL/L (ref 7–16)
AST SERPL-CCNC: 15 IU/L (ref 15–37)
BASOPHILS ABSOLUTE: 0 K/CU MM
BASOPHILS RELATIVE PERCENT: 0.4 % (ref 0–1)
BILIRUB SERPL-MCNC: 0.3 MG/DL (ref 0–1)
BUN SERPL-MCNC: 35 MG/DL (ref 6–23)
CALCIUM SERPL-MCNC: 8.4 MG/DL (ref 8.3–10.6)
CHLORIDE BLD-SCNC: 111 MMOL/L (ref 99–110)
CO2: 18 MMOL/L (ref 21–32)
CREAT SERPL-MCNC: 2.5 MG/DL (ref 0.9–1.3)
DIFFERENTIAL TYPE: ABNORMAL
EOSINOPHILS ABSOLUTE: 0.3 K/CU MM
EOSINOPHILS RELATIVE PERCENT: 4.4 % (ref 0–3)
GFR SERPL CREATININE-BSD FRML MDRD: 25 ML/MIN/1.73M2
GLUCOSE SERPL-MCNC: 178 MG/DL (ref 70–99)
HCT VFR BLD CALC: 28.8 % (ref 42–52)
HEMOGLOBIN: 8.6 GM/DL (ref 13.5–18)
IMMATURE NEUTROPHIL %: 0.4 % (ref 0–0.43)
INR BLD: 1.2 INDEX
LYMPHOCYTES ABSOLUTE: 1 K/CU MM
LYMPHOCYTES RELATIVE PERCENT: 13.6 % (ref 24–44)
MCH RBC QN AUTO: 30 PG (ref 27–31)
MCHC RBC AUTO-ENTMCNC: 29.9 % (ref 32–36)
MCV RBC AUTO: 100.3 FL (ref 78–100)
MONOCYTES ABSOLUTE: 0.9 K/CU MM
MONOCYTES RELATIVE PERCENT: 12.2 % (ref 0–4)
NEUTROPHILS RELATIVE PERCENT: 69 % (ref 36–66)
NUCLEATED RBC %: 0 %
PDW BLD-RTO: 15.9 % (ref 11.7–14.9)
PLATELET # BLD: 156 K/CU MM (ref 140–440)
PMV BLD AUTO: 11.3 FL (ref 7.5–11.1)
POTASSIUM SERPL-SCNC: 4.6 MMOL/L (ref 3.5–5.1)
PROTHROMBIN TIME: 15.7 SECONDS (ref 11.7–14.5)
RBC # BLD: 2.87 M/CU MM (ref 4.6–6.2)
SEGMENTED NEUTROPHILS ABSOLUTE COUNT: 5.2 K/CU MM
SODIUM BLD-SCNC: 141 MMOL/L (ref 135–145)
TOTAL IMMATURE NEUTOROPHIL: 0.03 K/CU MM
TOTAL NUCLEATED RBC: 0 K/CU MM
TOTAL PROTEIN: 6.6 GM/DL (ref 6.4–8.2)
WBC # BLD: 7.5 K/CU MM (ref 4–10.5)

## 2024-04-16 PROCEDURE — 96374 THER/PROPH/DIAG INJ IV PUSH: CPT

## 2024-04-16 PROCEDURE — 99284 EMERGENCY DEPT VISIT MOD MDM: CPT

## 2024-04-16 PROCEDURE — 72125 CT NECK SPINE W/O DYE: CPT

## 2024-04-16 PROCEDURE — 6370000000 HC RX 637 (ALT 250 FOR IP): Performed by: NURSE PRACTITIONER

## 2024-04-16 PROCEDURE — 2580000003 HC RX 258: Performed by: NURSE PRACTITIONER

## 2024-04-16 PROCEDURE — 80053 COMPREHEN METABOLIC PANEL: CPT

## 2024-04-16 PROCEDURE — 70450 CT HEAD/BRAIN W/O DYE: CPT

## 2024-04-16 PROCEDURE — 6360000002 HC RX W HCPCS: Performed by: NURSE PRACTITIONER

## 2024-04-16 PROCEDURE — 85025 COMPLETE CBC W/AUTO DIFF WBC: CPT

## 2024-04-16 PROCEDURE — 96375 TX/PRO/DX INJ NEW DRUG ADDON: CPT

## 2024-04-16 PROCEDURE — 85610 PROTHROMBIN TIME: CPT

## 2024-04-16 RX ORDER — ONDANSETRON 2 MG/ML
4 INJECTION INTRAMUSCULAR; INTRAVENOUS ONCE
Status: COMPLETED | OUTPATIENT
Start: 2024-04-16 | End: 2024-04-16

## 2024-04-16 RX ORDER — 0.9 % SODIUM CHLORIDE 0.9 %
1000 INTRAVENOUS SOLUTION INTRAVENOUS ONCE
Status: COMPLETED | OUTPATIENT
Start: 2024-04-16 | End: 2024-04-16

## 2024-04-16 RX ORDER — LIDOCAINE 4 G/G
1 PATCH TOPICAL DAILY
Qty: 30 PATCH | Refills: 0 | Status: SHIPPED | OUTPATIENT
Start: 2024-04-16 | End: 2024-05-16

## 2024-04-16 RX ORDER — PREDNISONE 20 MG/1
40 TABLET ORAL DAILY
Qty: 10 TABLET | Refills: 0 | Status: SHIPPED | OUTPATIENT
Start: 2024-04-16 | End: 2024-04-21

## 2024-04-16 RX ORDER — SUMATRIPTAN 50 MG/1
50 TABLET, FILM COATED ORAL DAILY PRN
COMMUNITY
Start: 2024-04-15

## 2024-04-16 RX ORDER — MORPHINE SULFATE 4 MG/ML
4 INJECTION, SOLUTION INTRAMUSCULAR; INTRAVENOUS ONCE
Status: COMPLETED | OUTPATIENT
Start: 2024-04-16 | End: 2024-04-16

## 2024-04-16 RX ORDER — OXYCODONE HYDROCHLORIDE 5 MG/1
5 TABLET ORAL EVERY 6 HOURS PRN
Status: DISCONTINUED | OUTPATIENT
Start: 2024-04-16 | End: 2024-04-16 | Stop reason: HOSPADM

## 2024-04-16 RX ADMIN — SODIUM CHLORIDE 1000 ML: 9 INJECTION, SOLUTION INTRAVENOUS at 09:54

## 2024-04-16 RX ADMIN — MORPHINE SULFATE 4 MG: 4 INJECTION, SOLUTION INTRAMUSCULAR; INTRAVENOUS at 09:54

## 2024-04-16 RX ADMIN — OXYCODONE HYDROCHLORIDE 5 MG: 5 TABLET ORAL at 12:22

## 2024-04-16 RX ADMIN — ONDANSETRON 4 MG: 2 INJECTION INTRAMUSCULAR; INTRAVENOUS at 09:54

## 2024-04-16 ASSESSMENT — PAIN SCALES - GENERAL
PAINLEVEL_OUTOF10: 3
PAINLEVEL_OUTOF10: 2
PAINLEVEL_OUTOF10: 10
PAINLEVEL_OUTOF10: 10

## 2024-04-16 ASSESSMENT — PAIN DESCRIPTION - LOCATION
LOCATION: HEAD
LOCATION: HEAD

## 2024-04-16 ASSESSMENT — PAIN DESCRIPTION - DESCRIPTORS
DESCRIPTORS: ACHING;THROBBING
DESCRIPTORS: ACHING

## 2024-04-16 ASSESSMENT — PAIN - FUNCTIONAL ASSESSMENT: PAIN_FUNCTIONAL_ASSESSMENT: 0-10

## 2024-04-16 NOTE — ED PROVIDER NOTES
tablet Take 1 tablet by mouth daily, Disp-30 tablet, R-3NO PRINT      magnesium hydroxide (MILK OF MAGNESIA) 400 MG/5ML suspension Take 30 mLs by mouth daily as needed for ConstipationHistorical Med      acetaminophen (TYLENOL) 325 MG tablet Take 2 tablets by mouth every 6 hours as needed for Pain, Disp-120 tablet, R-3Print       !! - Potential duplicate medications found. Please discuss with provider.          ALLERGIES     Patient has no known allergies.    FAMILYHISTORY       Family History   Problem Relation Age of Onset    Cancer Mother     Diabetes Father     Heart Disease Father     High Blood Pressure Father     Diabetes Sister     High Blood Pressure Sister     Cancer Brother         prostate, breast    Diabetes Brother     Cancer Maternal Uncle     Diabetes Maternal Grandmother     Stroke Maternal Grandfather         SOCIAL HISTORY       Social History     Socioeconomic History    Marital status:     Number of children: 3   Tobacco Use    Smoking status: Former     Current packs/day: 0.00     Types: Cigarettes     Quit date: 1976     Years since quittin.8    Smokeless tobacco: Never   Substance and Sexual Activity    Alcohol use: No     Comment: Quit in     Drug use: No     Social Determinants of Health     Food Insecurity: No Food Insecurity (3/7/2024)    Hunger Vital Sign     Worried About Running Out of Food in the Last Year: Never true     Ran Out of Food in the Last Year: Never true   Transportation Needs: No Transportation Needs (3/7/2024)    PRAPARE - Transportation     Lack of Transportation (Medical): No     Lack of Transportation (Non-Medical): No   Housing Stability: Low Risk  (3/7/2024)    Housing Stability Vital Sign     Unable to Pay for Housing in the Last Year: No     Number of Places Lived in the Last Year: 1     Unstable Housing in the Last Year: No       SCREENINGS   NIH Stroke Scale  Interval: Baseline  Level of Consciousness (1a): Alert  LOC Questions (1b): Answers

## 2024-04-16 NOTE — ED NOTES
Take 1 tablet by mouth nightly 11/9/23   GLEN Hope MD   miconazole (MICOTIN) 2 % powder Apply topically 2 times daily. 11/9/23   GLEN Hope MD   albuterol sulfate HFA (VENTOLIN HFA) 108 (90 Base) MCG/ACT inhaler Inhale 1 puff into the lungs 4 times daily Indications: shortness of breath    Celso Espinoza MD   amLODIPine (NORVASC) 10 MG tablet Take 1 tablet by mouth daily 5/27/23   Giovany Zamarripa MD   carvedilol (COREG) 25 MG tablet Take 1 tablet by mouth 2 times daily    Celso Espinoza MD   senna (SENOKOT) 8.6 MG tablet Take 2 tablets by mouth daily    Celso Espinoza MD   insulin lispro (HUMALOG) 100 UNIT/ML SOLN injection vial Inject 0-8 Units into the skin 3 times daily (with meals)  No Insulin  200-249 2 Units  250-299 4 Units  300-349 6 Units  Over 349 8 Units and notify physician 11/5/22   Reinier Valentin MD   omeprazole (PRILOSEC) 20 MG delayed release capsule Take 2 capsules by mouth daily    Celso Espinoza MD   levothyroxine (SYNTHROID) 75 MCG tablet Take 1 tablet by mouth Daily    Celso Espinoza MD   aspirin 81 MG chewable tablet Take 1 tablet by mouth daily  Patient taking differently: Take 1 tablet by mouth nightly 4/22/22   Valentino Lopez MD   magnesium hydroxide (MILK OF MAGNESIA) 400 MG/5ML suspension Take 30 mLs by mouth daily as needed for Constipation  Patient not taking: Reported on 4/16/2024    Celso Espinoza MD   acetaminophen (TYLENOL) 325 MG tablet Take 2 tablets by mouth every 6 hours as needed for Pain 1/6/22   Jude Ryan,      Medications added or changed (ex. new medication, dosage change, interval change, formulation change):  Sumatriptan (added)    Comments:  Medication list reviewed with patient and insurance claims verified.  Insulin updated per information received. Patient reports only uses on sliding scale. States he does not take night insulin, reports drops his BS to low.  Patient reports he has

## 2024-05-11 ENCOUNTER — HOSPITAL ENCOUNTER (INPATIENT)
Age: 84
LOS: 2 days | Discharge: HOME | DRG: 988 | End: 2024-05-13
Attending: STUDENT IN AN ORGANIZED HEALTH CARE EDUCATION/TRAINING PROGRAM | Admitting: STUDENT IN AN ORGANIZED HEALTH CARE EDUCATION/TRAINING PROGRAM
Payer: MEDICARE

## 2024-05-11 DIAGNOSIS — N39.0 URINARY TRACT INFECTION ASSOCIATED WITH CYSTOSTOMY CATHETER, INITIAL ENCOUNTER (HCC): Primary | ICD-10-CM

## 2024-05-11 DIAGNOSIS — N18.9 CHRONIC KIDNEY DISEASE, UNSPECIFIED CKD STAGE: ICD-10-CM

## 2024-05-11 DIAGNOSIS — T83.510A URINARY TRACT INFECTION ASSOCIATED WITH CYSTOSTOMY CATHETER, INITIAL ENCOUNTER (HCC): Primary | ICD-10-CM

## 2024-05-11 DIAGNOSIS — R79.89 ELEVATED LACTIC ACID LEVEL: ICD-10-CM

## 2024-05-11 DIAGNOSIS — E87.5 HYPERKALEMIA: ICD-10-CM

## 2024-05-11 DIAGNOSIS — R73.9 HYPERGLYCEMIA: ICD-10-CM

## 2024-05-11 LAB
ALBUMIN SERPL-MCNC: 4 GM/DL (ref 3.4–5)
ALP BLD-CCNC: 100 IU/L (ref 40–128)
ALT SERPL-CCNC: 11 U/L (ref 10–40)
ANION GAP SERPL CALCULATED.3IONS-SCNC: 12 MMOL/L (ref 7–16)
AST SERPL-CCNC: 13 IU/L (ref 15–37)
B-OH-BUTYR SERPL-MCNC: 2.1 MG/DL (ref 0–3)
BACTERIA: ABNORMAL /HPF
BASE EXCESS: 6 (ref 0–3)
BASOPHILS ABSOLUTE: 0 K/CU MM
BASOPHILS RELATIVE PERCENT: 0.1 % (ref 0–1)
BILIRUB SERPL-MCNC: 0.2 MG/DL (ref 0–1)
BILIRUBIN, URINE: NEGATIVE MG/DL
BLOOD, URINE: ABNORMAL
BUN SERPL-MCNC: 52 MG/DL (ref 6–23)
CALCIUM SERPL-MCNC: 8.7 MG/DL (ref 8.3–10.6)
CHLORIDE BLD-SCNC: 101 MMOL/L (ref 99–110)
CLARITY: CLEAR
CO2: 20 MMOL/L (ref 21–32)
COLOR: YELLOW
COMMENT: ABNORMAL
CREAT SERPL-MCNC: 2.4 MG/DL (ref 0.9–1.3)
DIFFERENTIAL TYPE: ABNORMAL
EKG ATRIAL RATE: 61 BPM
EKG DIAGNOSIS: NORMAL
EKG P AXIS: 83 DEGREES
EKG P-R INTERVAL: 250 MS
EKG Q-T INTERVAL: 400 MS
EKG QRS DURATION: 80 MS
EKG QTC CALCULATION (BAZETT): 402 MS
EKG R AXIS: -16 DEGREES
EKG T AXIS: -7 DEGREES
EKG VENTRICULAR RATE: 61 BPM
EOSINOPHILS ABSOLUTE: 0 K/CU MM
EOSINOPHILS RELATIVE PERCENT: 0 % (ref 0–3)
GFR, ESTIMATED: 26 ML/MIN/1.73M2
GLUCOSE BLD-MCNC: 238 MG/DL (ref 70–99)
GLUCOSE BLD-MCNC: 312 MG/DL (ref 70–99)
GLUCOSE BLD-MCNC: 317 MG/DL (ref 70–99)
GLUCOSE BLD-MCNC: 351 MG/DL (ref 70–99)
GLUCOSE BLD-MCNC: 438 MG/DL (ref 70–99)
GLUCOSE SERPL-MCNC: 391 MG/DL (ref 70–99)
GLUCOSE URINE: 500 MG/DL
HCO3 VENOUS: 20.7 MMOL/L (ref 22–29)
HCT VFR BLD CALC: 28.2 % (ref 42–52)
HEMOGLOBIN: 8.8 GM/DL (ref 13.5–18)
IMMATURE NEUTROPHIL %: 0.4 % (ref 0–0.43)
KETONES, URINE: NEGATIVE MG/DL
LACTATE: 2.6 MMOL/L (ref 0.5–1.9)
LEUKOCYTE ESTERASE, URINE: ABNORMAL
LYMPHOCYTES ABSOLUTE: 0.8 K/CU MM
LYMPHOCYTES RELATIVE PERCENT: 8.8 % (ref 24–44)
MCH RBC QN AUTO: 30.7 PG (ref 27–31)
MCHC RBC AUTO-ENTMCNC: 31.2 % (ref 32–36)
MCV RBC AUTO: 98.3 FL (ref 78–100)
MONOCYTES ABSOLUTE: 0.3 K/CU MM
MONOCYTES RELATIVE PERCENT: 2.6 % (ref 0–4)
MUCUS: ABNORMAL HPF
NEUTROPHILS ABSOLUTE: 8.4 K/CU MM
NEUTROPHILS RELATIVE PERCENT: 88.1 % (ref 36–66)
NITRITE URINE, QUANTITATIVE: POSITIVE
NUCLEATED RBC %: 0 %
O2 SAT, VEN: 95.4 % (ref 50–70)
PCO2, VEN: 42 MMHG (ref 41–51)
PDW BLD-RTO: 15 % (ref 11.7–14.9)
PH VENOUS: 7.3 (ref 7.32–7.43)
PH, URINE: 6.5 (ref 5–8)
PLATELET # BLD: 203 K/CU MM (ref 140–440)
PMV BLD AUTO: 12.2 FL (ref 7.5–11.1)
PO2, VEN: 164 MMHG (ref 28–48)
POTASSIUM SERPL-SCNC: 6.2 MMOL/L (ref 3.5–5.1)
PROTEIN UA: ABNORMAL MG/DL
RBC # BLD: 2.87 M/CU MM (ref 4.6–6.2)
RBC URINE: 30 /HPF (ref 0–3)
SODIUM BLD-SCNC: 133 MMOL/L (ref 135–145)
SPECIFIC GRAVITY UA: 1.01 (ref 1–1.03)
SQUAMOUS EPITHELIAL: <1 /HPF
TOTAL IMMATURE NEUTOROPHIL: 0.04 K/CU MM
TOTAL NUCLEATED RBC: 0 K/CU MM
TOTAL PROTEIN: 7.6 GM/DL (ref 6.4–8.2)
TRICHOMONAS: ABNORMAL /HPF
UROBILINOGEN, URINE: 0.2 MG/DL (ref 0.2–1)
WBC # BLD: 9.6 K/CU MM (ref 4–10.5)
WBC UA: 88 /HPF (ref 0–2)
YEAST: ABNORMAL /HPF

## 2024-05-11 PROCEDURE — 6360000002 HC RX W HCPCS

## 2024-05-11 PROCEDURE — 6360000002 HC RX W HCPCS: Performed by: STUDENT IN AN ORGANIZED HEALTH CARE EDUCATION/TRAINING PROGRAM

## 2024-05-11 PROCEDURE — 80053 COMPREHEN METABOLIC PANEL: CPT

## 2024-05-11 PROCEDURE — 6370000000 HC RX 637 (ALT 250 FOR IP): Performed by: STUDENT IN AN ORGANIZED HEALTH CARE EDUCATION/TRAINING PROGRAM

## 2024-05-11 PROCEDURE — 6370000000 HC RX 637 (ALT 250 FOR IP): Performed by: INTERNAL MEDICINE

## 2024-05-11 PROCEDURE — 82010 KETONE BODYS QUAN: CPT

## 2024-05-11 PROCEDURE — 85025 COMPLETE CBC W/AUTO DIFF WBC: CPT

## 2024-05-11 PROCEDURE — 83605 ASSAY OF LACTIC ACID: CPT

## 2024-05-11 PROCEDURE — 84145 PROCALCITONIN (PCT): CPT

## 2024-05-11 PROCEDURE — 2140000000 HC CCU INTERMEDIATE R&B

## 2024-05-11 PROCEDURE — 93010 ELECTROCARDIOGRAM REPORT: CPT | Performed by: INTERNAL MEDICINE

## 2024-05-11 PROCEDURE — 99285 EMERGENCY DEPT VISIT HI MDM: CPT

## 2024-05-11 PROCEDURE — 87086 URINE CULTURE/COLONY COUNT: CPT

## 2024-05-11 PROCEDURE — 82805 BLOOD GASES W/O2 SATURATION: CPT

## 2024-05-11 PROCEDURE — 93005 ELECTROCARDIOGRAM TRACING: CPT

## 2024-05-11 PROCEDURE — 86140 C-REACTIVE PROTEIN: CPT

## 2024-05-11 PROCEDURE — 82962 GLUCOSE BLOOD TEST: CPT

## 2024-05-11 PROCEDURE — 96365 THER/PROPH/DIAG IV INF INIT: CPT

## 2024-05-11 PROCEDURE — 2580000003 HC RX 258

## 2024-05-11 PROCEDURE — 87040 BLOOD CULTURE FOR BACTERIA: CPT

## 2024-05-11 PROCEDURE — 6370000000 HC RX 637 (ALT 250 FOR IP)

## 2024-05-11 PROCEDURE — 81001 URINALYSIS AUTO W/SCOPE: CPT

## 2024-05-11 PROCEDURE — 96375 TX/PRO/DX INJ NEW DRUG ADDON: CPT

## 2024-05-11 PROCEDURE — 2500000003 HC RX 250 WO HCPCS

## 2024-05-11 PROCEDURE — 51798 US URINE CAPACITY MEASURE: CPT

## 2024-05-11 PROCEDURE — 80048 BASIC METABOLIC PNL TOTAL CA: CPT

## 2024-05-11 PROCEDURE — 2580000003 HC RX 258: Performed by: STUDENT IN AN ORGANIZED HEALTH CARE EDUCATION/TRAINING PROGRAM

## 2024-05-11 RX ORDER — ACETAMINOPHEN 650 MG/1
650 SUPPOSITORY RECTAL EVERY 6 HOURS PRN
Status: DISCONTINUED | OUTPATIENT
Start: 2024-05-11 | End: 2024-05-13 | Stop reason: HOSPADM

## 2024-05-11 RX ORDER — INSULIN LISPRO 100 [IU]/ML
10 INJECTION, SOLUTION INTRAVENOUS; SUBCUTANEOUS
Status: DISCONTINUED | OUTPATIENT
Start: 2024-05-11 | End: 2024-05-13 | Stop reason: HOSPADM

## 2024-05-11 RX ORDER — SODIUM CHLORIDE 0.9 % (FLUSH) 0.9 %
5-40 SYRINGE (ML) INJECTION EVERY 12 HOURS SCHEDULED
Status: DISCONTINUED | OUTPATIENT
Start: 2024-05-11 | End: 2024-05-13 | Stop reason: HOSPADM

## 2024-05-11 RX ORDER — INSULIN GLARGINE 100 [IU]/ML
20 INJECTION, SOLUTION SUBCUTANEOUS NIGHTLY
Status: DISCONTINUED | OUTPATIENT
Start: 2024-05-11 | End: 2024-05-11

## 2024-05-11 RX ORDER — INSULIN LISPRO 100 [IU]/ML
0-8 INJECTION, SOLUTION INTRAVENOUS; SUBCUTANEOUS
Status: DISCONTINUED | OUTPATIENT
Start: 2024-05-11 | End: 2024-05-12

## 2024-05-11 RX ORDER — AMLODIPINE BESYLATE 10 MG/1
10 TABLET ORAL DAILY
Status: DISCONTINUED | OUTPATIENT
Start: 2024-05-11 | End: 2024-05-13 | Stop reason: HOSPADM

## 2024-05-11 RX ORDER — ATORVASTATIN CALCIUM 10 MG/1
20 TABLET, FILM COATED ORAL NIGHTLY
Status: DISCONTINUED | OUTPATIENT
Start: 2024-05-11 | End: 2024-05-13 | Stop reason: HOSPADM

## 2024-05-11 RX ORDER — ALBUTEROL SULFATE 90 UG/1
1 AEROSOL, METERED RESPIRATORY (INHALATION) 4 TIMES DAILY
Status: DISCONTINUED | OUTPATIENT
Start: 2024-05-11 | End: 2024-05-13

## 2024-05-11 RX ORDER — BISACODYL 10 MG
10 SUPPOSITORY, RECTAL RECTAL DAILY PRN
Status: DISCONTINUED | OUTPATIENT
Start: 2024-05-11 | End: 2024-05-13 | Stop reason: HOSPADM

## 2024-05-11 RX ORDER — ENOXAPARIN SODIUM 100 MG/ML
40 INJECTION SUBCUTANEOUS DAILY
Status: DISCONTINUED | OUTPATIENT
Start: 2024-05-11 | End: 2024-05-13

## 2024-05-11 RX ORDER — ASPIRIN 81 MG/1
81 TABLET, CHEWABLE ORAL DAILY
Status: DISCONTINUED | OUTPATIENT
Start: 2024-05-11 | End: 2024-05-13 | Stop reason: HOSPADM

## 2024-05-11 RX ORDER — POTASSIUM CHLORIDE 7.45 MG/ML
10 INJECTION INTRAVENOUS PRN
Status: DISCONTINUED | OUTPATIENT
Start: 2024-05-11 | End: 2024-05-13 | Stop reason: HOSPADM

## 2024-05-11 RX ORDER — INSULIN GLARGINE 100 [IU]/ML
10 INJECTION, SOLUTION SUBCUTANEOUS NIGHTLY
Status: DISCONTINUED | OUTPATIENT
Start: 2024-05-11 | End: 2024-05-12

## 2024-05-11 RX ORDER — CALCIUM GLUCONATE 20 MG/ML
1000 INJECTION, SOLUTION INTRAVENOUS ONCE
Status: COMPLETED | OUTPATIENT
Start: 2024-05-11 | End: 2024-05-11

## 2024-05-11 RX ORDER — ONDANSETRON 2 MG/ML
4 INJECTION INTRAMUSCULAR; INTRAVENOUS EVERY 6 HOURS PRN
Status: DISCONTINUED | OUTPATIENT
Start: 2024-05-11 | End: 2024-05-13 | Stop reason: HOSPADM

## 2024-05-11 RX ORDER — SODIUM CHLORIDE 0.9 % (FLUSH) 0.9 %
5-40 SYRINGE (ML) INJECTION PRN
Status: DISCONTINUED | OUTPATIENT
Start: 2024-05-11 | End: 2024-05-13 | Stop reason: HOSPADM

## 2024-05-11 RX ORDER — GLUCAGON 1 MG/ML
1 KIT INJECTION PRN
Status: DISCONTINUED | OUTPATIENT
Start: 2024-05-11 | End: 2024-05-13 | Stop reason: HOSPADM

## 2024-05-11 RX ORDER — CARVEDILOL 25 MG/1
25 TABLET ORAL 2 TIMES DAILY
Status: DISCONTINUED | OUTPATIENT
Start: 2024-05-11 | End: 2024-05-13 | Stop reason: HOSPADM

## 2024-05-11 RX ORDER — POTASSIUM CHLORIDE 20 MEQ/1
40 TABLET, EXTENDED RELEASE ORAL PRN
Status: DISCONTINUED | OUTPATIENT
Start: 2024-05-11 | End: 2024-05-13 | Stop reason: HOSPADM

## 2024-05-11 RX ORDER — LEVOTHYROXINE SODIUM 0.07 MG/1
75 TABLET ORAL DAILY
Status: DISCONTINUED | OUTPATIENT
Start: 2024-05-12 | End: 2024-05-13 | Stop reason: HOSPADM

## 2024-05-11 RX ORDER — SUMATRIPTAN 50 MG/1
50 TABLET, FILM COATED ORAL DAILY PRN
Status: DISCONTINUED | OUTPATIENT
Start: 2024-05-11 | End: 2024-05-13 | Stop reason: HOSPADM

## 2024-05-11 RX ORDER — POLYETHYLENE GLYCOL 3350 17 G/17G
17 POWDER, FOR SOLUTION ORAL DAILY PRN
Status: DISCONTINUED | OUTPATIENT
Start: 2024-05-11 | End: 2024-05-13 | Stop reason: HOSPADM

## 2024-05-11 RX ORDER — ACETAMINOPHEN 325 MG/1
650 TABLET ORAL EVERY 6 HOURS PRN
Status: DISCONTINUED | OUTPATIENT
Start: 2024-05-11 | End: 2024-05-13 | Stop reason: HOSPADM

## 2024-05-11 RX ORDER — SENNOSIDES A AND B 8.6 MG/1
2 TABLET, FILM COATED ORAL DAILY
Status: DISCONTINUED | OUTPATIENT
Start: 2024-05-11 | End: 2024-05-13 | Stop reason: HOSPADM

## 2024-05-11 RX ORDER — DEXTROSE MONOHYDRATE 100 MG/ML
INJECTION, SOLUTION INTRAVENOUS CONTINUOUS PRN
Status: DISCONTINUED | OUTPATIENT
Start: 2024-05-11 | End: 2024-05-13 | Stop reason: HOSPADM

## 2024-05-11 RX ORDER — ONDANSETRON 4 MG/1
4 TABLET, ORALLY DISINTEGRATING ORAL EVERY 8 HOURS PRN
Status: DISCONTINUED | OUTPATIENT
Start: 2024-05-11 | End: 2024-05-13 | Stop reason: HOSPADM

## 2024-05-11 RX ORDER — SODIUM CHLORIDE 9 MG/ML
1000 INJECTION, SOLUTION INTRAVENOUS ONCE
Status: COMPLETED | OUTPATIENT
Start: 2024-05-11 | End: 2024-05-11

## 2024-05-11 RX ORDER — POLYETHYLENE GLYCOL 3350 17 G/17G
17 POWDER, FOR SOLUTION ORAL 3 TIMES DAILY
Status: DISCONTINUED | OUTPATIENT
Start: 2024-05-11 | End: 2024-05-13 | Stop reason: HOSPADM

## 2024-05-11 RX ORDER — SODIUM CHLORIDE 9 MG/ML
INJECTION, SOLUTION INTRAVENOUS CONTINUOUS
Status: DISPENSED | OUTPATIENT
Start: 2024-05-11 | End: 2024-05-12

## 2024-05-11 RX ORDER — FERROUS SULFATE 325(65) MG
325 TABLET ORAL
Status: DISCONTINUED | OUTPATIENT
Start: 2024-05-12 | End: 2024-05-13 | Stop reason: HOSPADM

## 2024-05-11 RX ORDER — INSULIN LISPRO 100 [IU]/ML
0-8 INJECTION, SOLUTION INTRAVENOUS; SUBCUTANEOUS
Status: DISCONTINUED | OUTPATIENT
Start: 2024-05-11 | End: 2024-05-13 | Stop reason: HOSPADM

## 2024-05-11 RX ORDER — SODIUM CHLORIDE 9 MG/ML
INJECTION, SOLUTION INTRAVENOUS PRN
Status: DISCONTINUED | OUTPATIENT
Start: 2024-05-11 | End: 2024-05-13 | Stop reason: HOSPADM

## 2024-05-11 RX ORDER — CIPROFLOXACIN 2 MG/ML
400 INJECTION, SOLUTION INTRAVENOUS EVERY 12 HOURS
Status: DISCONTINUED | OUTPATIENT
Start: 2024-05-11 | End: 2024-05-11

## 2024-05-11 RX ORDER — PANTOPRAZOLE SODIUM 40 MG/1
40 TABLET, DELAYED RELEASE ORAL
Status: DISCONTINUED | OUTPATIENT
Start: 2024-05-12 | End: 2024-05-13 | Stop reason: HOSPADM

## 2024-05-11 RX ORDER — CIPROFLOXACIN 2 MG/ML
400 INJECTION, SOLUTION INTRAVENOUS EVERY 24 HOURS
Status: DISCONTINUED | OUTPATIENT
Start: 2024-05-11 | End: 2024-05-13 | Stop reason: HOSPADM

## 2024-05-11 RX ADMIN — ATORVASTATIN CALCIUM 20 MG: 10 TABLET, FILM COATED ORAL at 21:00

## 2024-05-11 RX ADMIN — CALCIUM GLUCONATE 1000 MG: 20 INJECTION, SOLUTION INTRAVENOUS at 16:05

## 2024-05-11 RX ADMIN — AMLODIPINE BESYLATE 10 MG: 10 TABLET ORAL at 20:59

## 2024-05-11 RX ADMIN — SODIUM CHLORIDE 1000 ML: 9 INJECTION, SOLUTION INTRAVENOUS at 14:49

## 2024-05-11 RX ADMIN — ENOXAPARIN SODIUM 40 MG: 100 INJECTION SUBCUTANEOUS at 20:59

## 2024-05-11 RX ADMIN — INSULIN LISPRO 10 UNITS: 100 INJECTION, SOLUTION INTRAVENOUS; SUBCUTANEOUS at 20:52

## 2024-05-11 RX ADMIN — INSULIN GLARGINE 10 UNITS: 100 INJECTION, SOLUTION SUBCUTANEOUS at 22:57

## 2024-05-11 RX ADMIN — SODIUM ZIRCONIUM CYCLOSILICATE 10 G: 10 POWDER, FOR SUSPENSION ORAL at 20:59

## 2024-05-11 RX ADMIN — ASPIRIN 81 MG: 81 TABLET, CHEWABLE ORAL at 20:59

## 2024-05-11 RX ADMIN — WATER 1000 MG: 1 INJECTION INTRAMUSCULAR; INTRAVENOUS; SUBCUTANEOUS at 16:07

## 2024-05-11 RX ADMIN — SODIUM CHLORIDE: 9 INJECTION, SOLUTION INTRAVENOUS at 20:57

## 2024-05-11 RX ADMIN — INSULIN HUMAN 10 UNITS: 100 INJECTION, SOLUTION PARENTERAL at 16:06

## 2024-05-11 RX ADMIN — SENNOSIDES 17.2 MG: 8.6 TABLET, FILM COATED ORAL at 20:59

## 2024-05-11 RX ADMIN — CARVEDILOL 25 MG: 25 TABLET, FILM COATED ORAL at 21:00

## 2024-05-11 RX ADMIN — POLYETHYLENE GLYCOL (3350) 17 G: 17 POWDER, FOR SOLUTION ORAL at 20:59

## 2024-05-11 RX ADMIN — INSULIN LISPRO 6 UNITS: 100 INJECTION, SOLUTION INTRAVENOUS; SUBCUTANEOUS at 22:56

## 2024-05-11 NOTE — H&P
V2.0  History and Physical      Name:  Greg Easton /Age/Sex: 1940  (83 y.o. male)   MRN & CSN:  [id number] & [id number] Encounter Date/Time: 2024 5:22 PM EDT   Location:  ED27/ED-27 PCP: Jacobo Zazueta MD       Hospital Day: 1    Assessment and Plan:   Greg Easton is a 83 y.o. male with a pmh of constipation, chronic Hopper catheter/suprapubic catheter, CKD, insulin-dependent diabetes mellitus who presents with       Plan:  Hyperkalemia:  CKD stage IIIb/IV:  -Admit to stepdown unit  -Telemetry  -Potassium 6.2 on admission  -EKG with no acute changes, chronic first-degree AV block from previous EKG on 3/7/2024  -Patient status post calcium gluconate 1 g, insulin R 10 units and Lokelma 10 g in the ED  -Follow repeat BMP  -Nephrology consulted from the ED  -Will continue Lokelma 10 g 3 times daily  -Strict intake and output regards  -Avoid nephrotoxic medications  -Adjust medications to patient's creatinine clearance    Possible suprapubic catheter related UTI:  -Patient vital signs stable, elevated lactic acid levels, no leukocytosis, inflammatory markers pending  -Urinalysis concerning for positive nitrite, large leukocyte esterase  -Patient's status post ceftriaxone in the ED and 1 L IV NS bolus  -Will continue gentle IV hydration, will start patient with ciprofloxacin based on previous urine cultures positive for Pseudomonas resistant to ceftriaxone but sensitive to ciprofloxacin  -Suprapubic catheter will need to be changed  -Follow blood cultures/urine cultures    Essential hypertension-continue amlodipine  Insulin-dependent diabetes mellitus-glucose levels in 400s in the ED likely from dehydration, improving to 300 status post hydration/insulin regular 10 units.  Continue low-dose sliding scale insulin, with hypoglycemic protocol.  Follow A1c  Mixed hyperlipidemia-continue statin  History of recent admission with fecal impaction and stercoral colitis    Disposition:   Current Living

## 2024-05-11 NOTE — ED NOTES
ED TO INPATIENT SBAR HANDOFF    Patient Name: Greg Easton   :  1940  83 y.o.   Preferred Name  Greg   Family/Caregiver Present yes   Restraints no   C-SSRS: Risk of Suicide: No Risk  Sitter no   Sepsis Risk Score Sepsis Risk Score: 2.27      Situation  Chief Complaint   Patient presents with    Hyperglycemia     Brief Description of Patient's Condition:   Pt arrived with complaint of high BG.   Initial . Given 1L of NS in ED.   Pt K+ resulted at 6.2, given K+ cocktail in ED.  Pt has UTI, given Rocephin in ED.  Pt uses walker for ambulation, has chronic mccrary, continent of stool.   Pt independent of ADLs at home.   Mental Status: oriented, alert, coherent, logical, thought processes intact, and able to concentrate and follow conversation  Arrived from: home    Imaging:   No orders to display     Abnormal labs:   Abnormal Labs Reviewed   CBC WITH AUTO DIFFERENTIAL - Abnormal; Notable for the following components:       Result Value    RBC 2.87 (*)     Hemoglobin 8.8 (*)     Hematocrit 28.2 (*)     MCHC 31.2 (*)     RDW 15.0 (*)     MPV 12.2 (*)     Neutrophils % 88.1 (*)     Lymphocytes % 8.8 (*)     All other components within normal limits   COMPREHENSIVE METABOLIC PANEL - Abnormal; Notable for the following components:    Sodium 133 (*)     Potassium 6.2 (*)     CO2 20 (*)     Glucose 391 (*)     BUN 52 (*)     Creatinine 2.4 (*)     Est, Glom Filt Rate 26 (*)     AST 13 (*)     All other components within normal limits   BLOOD GAS, VENOUS - Abnormal; Notable for the following components:    pH, Dru 7.30 (*)     pO2, Dru 164 (*)     Base Excess 6 (*)     HCO3, Venous 20.7 (*)     O2 Sat, Dru 95.4 (*)     All other components within normal limits   LACTIC ACID - Abnormal; Notable for the following components:    Lactate 2.6 (*)     All other components within normal limits   URINALYSIS WITH REFLEX TO CULTURE - Abnormal; Notable for the following components:    Glucose, Ur 500 (*)     Blood, Urine

## 2024-05-11 NOTE — ED PROVIDER NOTES
[facility] EMERGENCY DEPARTMENT  EMERGENCY DEPARTMENT ENCOUNTER        Pt Name: Greg Easton  MRN: [id number]  Birthdate 1940  Date of evaluation: 5/11/2024  Provider: VIVIANA Fuentes - JOHNNY  PCP: Jacobo Zazueta MD  Note Started: 3:47 PM EDT 5/11/24       I have seen and evaluated this patient with my supervising physician Modesta Espinoza DO.      CHIEF COMPLAINT       Chief Complaint   Patient presents with    Hyperglycemia       HISTORY OF PRESENT ILLNESS: 1 or more Elements     History From: Family at bedside patient    Limitations to history : None    Social Determinants Significantly Affecting Health : None    Chief Complaint: High blood sugar    Greg Easton is a 83 y.o. 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  Who presents to ED with family.  He stated when he checked his blood sugar this morning it was 600.  Stated he was evaluated for an increase in his blood sugar yesterday by his primary care who adjusted his medications at that time.  He states he has been taking the increased dosage recommended insulin as prescribed.  He states he still continues to have high blood sugars.  He denies any nausea vomiting diarrhea fevers.  Denies any cough congestion or shortness of breath.  Denies any chest pain or abdominal pain.  States his suprapubic catheter does leak occasionally when he feels as if there is a lot of stool to come out.  He denies any dark-colored stools or blood in his stools diarrhea or constipation.    Nursing Notes were all reviewed and agreed with or any disagreements were addressed in the HPI.    REVIEW OF SYSTEMS :      Review of Systems    Positives and Pertinent negatives as per HPI.     SURGICAL HISTORY     Past Surgical History:   Procedure Laterality Date    BLADDER SURGERY      BLADDER SURGERY Left 03/17/2022    CYSTOSCOPY LEFT STENT 
Venous   Result Value Ref Range    pH, Dru 7.30 (L) 7.32 - 7.43    pCO2, Dru 42 41 - 51 mmHG    pO2, Dru 164 (H) 28 - 48 mmHG    Base Excess 6 (H) 0 - 3.0    HCO3, Venous 20.7 (L) 22 - 29 MMOL/L    O2 Sat, Dru 95.4 (H) 50 - 70 %    Comment VBG    Lactic Acid   Result Value Ref Range    Lactate 2.6 (HH) 0.5 - 1.9 mMOL/L   Urinalysis with Reflex to Culture    Specimen: Urine   Result Value Ref Range    Color, UA YELLOW YELLOW    Clarity, UA CLEAR CLEAR    Glucose, Ur 500 (A) NEGATIVE MG/DL    Bilirubin, Urine NEGATIVE NEGATIVE MG/DL    Ketones, Urine NEGATIVE NEGATIVE MG/DL    Specific Gravity, UA 1.015 1.001 - 1.035    Blood, Urine SMALL NUMBER OR AMOUNT OBSERVED (A) NEGATIVE    pH, Urine 6.5 5.0 - 8.0    Protein, UA TRACE (A) NEGATIVE MG/DL    Urobilinogen, Urine 0.2 0.2 - 1.0 MG/DL    Nitrite Urine, Quantitative POSITIVE (A) NEGATIVE    Leukocyte Esterase, Urine MODERATE NUMBER OR AMOUNT OBSERVED (A) NEGATIVE   Urine Microscopic with Reflex to Culture   Result Value Ref Range    RBC, UA 30 (H) 0 - 3 /HPF    WBC, UA 88 (H) 0 - 2 /HPF    Bacteria, UA MODERATE (A) NEGATIVE /HPF    Yeast, UA RARE /HPF    Squam Epithel, UA <1 /HPF    Mucus, UA RARE (A) NEGATIVE HPF    Trichomonas NONE SEEN NONE SEEN /HPF   POCT Glucose   Result Value Ref Range    POC Glucose 438 (H) 70 - 99 MG/DL   POCT Glucose   Result Value Ref Range    POC Glucose 351 (H) 70 - 99 MG/DL   EKG 12 Lead (Select if HR greater than 110)   Result Value Ref Range    Ventricular Rate 61 BPM    Atrial Rate 61 BPM    P-R Interval 250 ms    QRS Duration 80 ms    Q-T Interval 400 ms    QTc Calculation (Bazett) 402 ms    P Axis 83 degrees    R Axis -16 degrees    T Axis -7 degrees    Diagnosis       Sinus rhythm with 1st degree AV block  When compared with ECG of 07-MAR-2024 09:01,  No significant change was found  Confirmed by GASPER JARVIS ([id number]) on 5/11/2024 5:45:09 PM           Radiologic Studies:   Radiologist impression reviewed and significant

## 2024-05-12 LAB
ALBUMIN SERPL-MCNC: 3.6 GM/DL (ref 3.4–5)
ALP BLD-CCNC: 84 IU/L (ref 40–128)
ALT SERPL-CCNC: 9 U/L (ref 10–40)
ANION GAP SERPL CALCULATED.3IONS-SCNC: 10 MMOL/L (ref 7–16)
ANION GAP SERPL CALCULATED.3IONS-SCNC: 13 MMOL/L (ref 7–16)
AST SERPL-CCNC: 11 IU/L (ref 15–37)
BASOPHILS ABSOLUTE: 0 K/CU MM
BASOPHILS RELATIVE PERCENT: 0.1 % (ref 0–1)
BILIRUB SERPL-MCNC: 0.2 MG/DL (ref 0–1)
BUN SERPL-MCNC: 46 MG/DL (ref 6–23)
BUN SERPL-MCNC: 49 MG/DL (ref 6–23)
CALCIUM SERPL-MCNC: 8.5 MG/DL (ref 8.3–10.6)
CALCIUM SERPL-MCNC: 8.7 MG/DL (ref 8.3–10.6)
CHLORIDE BLD-SCNC: 102 MMOL/L (ref 99–110)
CHLORIDE BLD-SCNC: 105 MMOL/L (ref 99–110)
CO2: 18 MMOL/L (ref 21–32)
CO2: 20 MMOL/L (ref 21–32)
CREAT SERPL-MCNC: 2.1 MG/DL (ref 0.9–1.3)
CREAT SERPL-MCNC: 2.2 MG/DL (ref 0.9–1.3)
CRP SERPL HS-MCNC: 3 MG/L
CULTURE: NORMAL
DIFFERENTIAL TYPE: ABNORMAL
EOSINOPHILS ABSOLUTE: 0 K/CU MM
EOSINOPHILS RELATIVE PERCENT: 0 % (ref 0–3)
GFR, ESTIMATED: 29 ML/MIN/1.73M2
GFR, ESTIMATED: 30 ML/MIN/1.73M2
GLUCOSE BLD-MCNC: 116 MG/DL (ref 70–99)
GLUCOSE BLD-MCNC: 126 MG/DL (ref 70–99)
GLUCOSE BLD-MCNC: 203 MG/DL (ref 70–99)
GLUCOSE BLD-MCNC: 245 MG/DL (ref 70–99)
GLUCOSE BLD-MCNC: 256 MG/DL (ref 70–99)
GLUCOSE BLD-MCNC: 62 MG/DL (ref 70–99)
GLUCOSE BLD-MCNC: 73 MG/DL (ref 70–99)
GLUCOSE BLD-MCNC: 75 MG/DL (ref 70–99)
GLUCOSE SERPL-MCNC: 141 MG/DL (ref 70–99)
GLUCOSE SERPL-MCNC: 271 MG/DL (ref 70–99)
HCT VFR BLD CALC: 27.2 % (ref 42–52)
HEMOGLOBIN: 8.4 GM/DL (ref 13.5–18)
IMMATURE NEUTROPHIL %: 0.5 % (ref 0–0.43)
LACTIC ACID, SEPSIS: 1.7 MMOL/L (ref 0.4–2)
LYMPHOCYTES ABSOLUTE: 1.4 K/CU MM
LYMPHOCYTES RELATIVE PERCENT: 18.5 % (ref 24–44)
Lab: NORMAL
MAGNESIUM: 1.8 MG/DL (ref 1.8–2.4)
MCH RBC QN AUTO: 30.2 PG (ref 27–31)
MCHC RBC AUTO-ENTMCNC: 30.9 % (ref 32–36)
MCV RBC AUTO: 97.8 FL (ref 78–100)
MONOCYTES ABSOLUTE: 0.8 K/CU MM
MONOCYTES RELATIVE PERCENT: 10.4 % (ref 0–4)
NEUTROPHILS ABSOLUTE: 5.4 K/CU MM
NEUTROPHILS RELATIVE PERCENT: 70.5 % (ref 36–66)
NUCLEATED RBC %: 0 %
PDW BLD-RTO: 15 % (ref 11.7–14.9)
PHOSPHORUS: 3 MG/DL (ref 2.5–4.9)
PLATELET # BLD: 197 K/CU MM (ref 140–440)
PMV BLD AUTO: 11.6 FL (ref 7.5–11.1)
POTASSIUM SERPL-SCNC: 5 MMOL/L (ref 3.5–5.1)
POTASSIUM SERPL-SCNC: 5.4 MMOL/L (ref 3.5–5.1)
PROCALCITONIN SERPL-MCNC: 0.21 NG/ML
RBC # BLD: 2.78 M/CU MM (ref 4.6–6.2)
SODIUM BLD-SCNC: 133 MMOL/L (ref 135–145)
SODIUM BLD-SCNC: 135 MMOL/L (ref 135–145)
SPECIMEN: NORMAL
TOTAL IMMATURE NEUTOROPHIL: 0.04 K/CU MM
TOTAL NUCLEATED RBC: 0 K/CU MM
TOTAL PROTEIN: 6.5 GM/DL (ref 6.4–8.2)
WBC # BLD: 7.7 K/CU MM (ref 4–10.5)

## 2024-05-12 PROCEDURE — 80053 COMPREHEN METABOLIC PANEL: CPT

## 2024-05-12 PROCEDURE — 84100 ASSAY OF PHOSPHORUS: CPT

## 2024-05-12 PROCEDURE — 6360000002 HC RX W HCPCS: Performed by: STUDENT IN AN ORGANIZED HEALTH CARE EDUCATION/TRAINING PROGRAM

## 2024-05-12 PROCEDURE — 85025 COMPLETE CBC W/AUTO DIFF WBC: CPT

## 2024-05-12 PROCEDURE — 6370000000 HC RX 637 (ALT 250 FOR IP): Performed by: STUDENT IN AN ORGANIZED HEALTH CARE EDUCATION/TRAINING PROGRAM

## 2024-05-12 PROCEDURE — 94640 AIRWAY INHALATION TREATMENT: CPT

## 2024-05-12 PROCEDURE — 36415 COLL VENOUS BLD VENIPUNCTURE: CPT

## 2024-05-12 PROCEDURE — 94761 N-INVAS EAR/PLS OXIMETRY MLT: CPT

## 2024-05-12 PROCEDURE — 2580000003 HC RX 258: Performed by: STUDENT IN AN ORGANIZED HEALTH CARE EDUCATION/TRAINING PROGRAM

## 2024-05-12 PROCEDURE — 83735 ASSAY OF MAGNESIUM: CPT

## 2024-05-12 PROCEDURE — 83036 HEMOGLOBIN GLYCOSYLATED A1C: CPT

## 2024-05-12 PROCEDURE — 6370000000 HC RX 637 (ALT 250 FOR IP): Performed by: INTERNAL MEDICINE

## 2024-05-12 PROCEDURE — 51798 US URINE CAPACITY MEASURE: CPT

## 2024-05-12 PROCEDURE — 82962 GLUCOSE BLOOD TEST: CPT

## 2024-05-12 PROCEDURE — 2140000000 HC CCU INTERMEDIATE R&B

## 2024-05-12 RX ORDER — INSULIN LISPRO 100 [IU]/ML
0-8 INJECTION, SOLUTION INTRAVENOUS; SUBCUTANEOUS
Status: DISCONTINUED | OUTPATIENT
Start: 2024-05-12 | End: 2024-05-13 | Stop reason: HOSPADM

## 2024-05-12 RX ORDER — INSULIN GLARGINE 100 [IU]/ML
15 INJECTION, SOLUTION SUBCUTANEOUS NIGHTLY
Status: DISCONTINUED | OUTPATIENT
Start: 2024-05-12 | End: 2024-05-13 | Stop reason: HOSPADM

## 2024-05-12 RX ADMIN — SENNOSIDES 17.2 MG: 8.6 TABLET, FILM COATED ORAL at 09:06

## 2024-05-12 RX ADMIN — ALBUTEROL SULFATE 1 PUFF: 90 AEROSOL, METERED RESPIRATORY (INHALATION) at 11:24

## 2024-05-12 RX ADMIN — SODIUM ZIRCONIUM CYCLOSILICATE 10 G: 10 POWDER, FOR SUSPENSION ORAL at 16:07

## 2024-05-12 RX ADMIN — Medication 16 G: at 20:54

## 2024-05-12 RX ADMIN — POLYETHYLENE GLYCOL (3350) 17 G: 17 POWDER, FOR SOLUTION ORAL at 09:06

## 2024-05-12 RX ADMIN — ENOXAPARIN SODIUM 40 MG: 100 INJECTION SUBCUTANEOUS at 09:06

## 2024-05-12 RX ADMIN — CARVEDILOL 25 MG: 25 TABLET, FILM COATED ORAL at 09:06

## 2024-05-12 RX ADMIN — LEVOTHYROXINE SODIUM 75 MCG: 0.07 TABLET ORAL at 05:27

## 2024-05-12 RX ADMIN — INSULIN LISPRO 10 UNITS: 100 INJECTION, SOLUTION INTRAVENOUS; SUBCUTANEOUS at 12:47

## 2024-05-12 RX ADMIN — ASPIRIN 81 MG: 81 TABLET, CHEWABLE ORAL at 09:06

## 2024-05-12 RX ADMIN — ALBUTEROL SULFATE 1 PUFF: 90 AEROSOL, METERED RESPIRATORY (INHALATION) at 15:03

## 2024-05-12 RX ADMIN — SODIUM ZIRCONIUM CYCLOSILICATE 10 G: 10 POWDER, FOR SUSPENSION ORAL at 09:06

## 2024-05-12 RX ADMIN — INSULIN LISPRO 2 UNITS: 100 INJECTION, SOLUTION INTRAVENOUS; SUBCUTANEOUS at 12:47

## 2024-05-12 RX ADMIN — SODIUM CHLORIDE, PRESERVATIVE FREE 10 ML: 5 INJECTION INTRAVENOUS at 09:05

## 2024-05-12 RX ADMIN — CIPROFLOXACIN 400 MG: 400 INJECTION, SOLUTION INTRAVENOUS at 00:19

## 2024-05-12 RX ADMIN — FERROUS SULFATE TAB 325 MG (65 MG ELEMENTAL FE) 325 MG: 325 (65 FE) TAB at 09:06

## 2024-05-12 RX ADMIN — ALBUTEROL SULFATE 1 PUFF: 90 AEROSOL, METERED RESPIRATORY (INHALATION) at 07:11

## 2024-05-12 RX ADMIN — SODIUM CHLORIDE, PRESERVATIVE FREE 10 ML: 5 INJECTION INTRAVENOUS at 21:47

## 2024-05-12 RX ADMIN — POLYETHYLENE GLYCOL (3350) 17 G: 17 POWDER, FOR SOLUTION ORAL at 16:07

## 2024-05-12 RX ADMIN — PANTOPRAZOLE SODIUM 40 MG: 40 TABLET, DELAYED RELEASE ORAL at 05:27

## 2024-05-12 RX ADMIN — INSULIN LISPRO 10 UNITS: 100 INJECTION, SOLUTION INTRAVENOUS; SUBCUTANEOUS at 18:20

## 2024-05-12 RX ADMIN — INSULIN LISPRO 4 UNITS: 100 INJECTION, SOLUTION INTRAVENOUS; SUBCUTANEOUS at 02:40

## 2024-05-12 RX ADMIN — INSULIN LISPRO 2 UNITS: 100 INJECTION, SOLUTION INTRAVENOUS; SUBCUTANEOUS at 18:19

## 2024-05-12 RX ADMIN — AMLODIPINE BESYLATE 10 MG: 10 TABLET ORAL at 09:06

## 2024-05-12 RX ADMIN — SODIUM ZIRCONIUM CYCLOSILICATE 10 G: 10 POWDER, FOR SUSPENSION ORAL at 20:59

## 2024-05-12 RX ADMIN — POLYETHYLENE GLYCOL (3350) 17 G: 17 POWDER, FOR SOLUTION ORAL at 20:59

## 2024-05-12 RX ADMIN — ATORVASTATIN CALCIUM 20 MG: 10 TABLET, FILM COATED ORAL at 21:00

## 2024-05-12 NOTE — PLAN OF CARE
Problem: Discharge Planning  Goal: Discharge to home or other facility with appropriate resources  5/12/2024 0959 by Kandi Ricks, RN  Outcome: Progressing  5/11/2024 2333 by Roldan Elliott, RN  Outcome: Progressing     Problem: Safety - Adult  Goal: Free from fall injury  5/12/2024 0959 by Kandi Ricks, RN  Outcome: Progressing  5/11/2024 2333 by Roldan Elliott, RN  Outcome: Progressing

## 2024-05-12 NOTE — PROCEDURES
The pt's indwelling SPT was removed after draining the balloon.    The site was prepped and draped.     New 18 Tajik SPT placed under sterile technique.  Balloon inflated to 10 mL.    Will need exchange in one month.

## 2024-05-13 ENCOUNTER — ANESTHESIA EVENT (OUTPATIENT)
Dept: OPERATING ROOM | Age: 84
DRG: 988 | End: 2024-05-13
Payer: MEDICARE

## 2024-05-13 ENCOUNTER — ANESTHESIA (OUTPATIENT)
Dept: OPERATING ROOM | Age: 84
DRG: 988 | End: 2024-05-13
Payer: MEDICARE

## 2024-05-13 ENCOUNTER — APPOINTMENT (OUTPATIENT)
Dept: GENERAL RADIOLOGY | Age: 84
DRG: 988 | End: 2024-05-13
Payer: MEDICARE

## 2024-05-13 VITALS
TEMPERATURE: 97.5 F | BODY MASS INDEX: 26.95 KG/M2 | HEIGHT: 68 IN | OXYGEN SATURATION: 100 % | RESPIRATION RATE: 12 BRPM | DIASTOLIC BLOOD PRESSURE: 58 MMHG | HEART RATE: 53 BPM | WEIGHT: 177.8 LBS | SYSTOLIC BLOOD PRESSURE: 160 MMHG

## 2024-05-13 LAB
ALBUMIN SERPL-MCNC: 3.2 GM/DL (ref 3.4–5)
ALP BLD-CCNC: 82 IU/L (ref 40–128)
ALT SERPL-CCNC: 9 U/L (ref 10–40)
ANION GAP SERPL CALCULATED.3IONS-SCNC: 11 MMOL/L (ref 7–16)
AST SERPL-CCNC: 12 IU/L (ref 15–37)
BASOPHILS ABSOLUTE: 0 K/CU MM
BASOPHILS RELATIVE PERCENT: 0.3 % (ref 0–1)
BILIRUB SERPL-MCNC: 0.2 MG/DL (ref 0–1)
BUN SERPL-MCNC: 48 MG/DL (ref 6–23)
CALCIUM SERPL-MCNC: 7.9 MG/DL (ref 8.3–10.6)
CHLORIDE BLD-SCNC: 104 MMOL/L (ref 99–110)
CO2: 22 MMOL/L (ref 21–32)
CREAT SERPL-MCNC: 2.1 MG/DL (ref 0.9–1.3)
DIFFERENTIAL TYPE: ABNORMAL
EOSINOPHILS ABSOLUTE: 0.2 K/CU MM
EOSINOPHILS RELATIVE PERCENT: 3.8 % (ref 0–3)
ESTIMATED AVERAGE GLUCOSE: 183 MG/DL
GFR, ESTIMATED: 30 ML/MIN/1.73M2
GLUCOSE BLD-MCNC: 166 MG/DL (ref 70–99)
GLUCOSE BLD-MCNC: 168 MG/DL (ref 70–99)
GLUCOSE BLD-MCNC: 172 MG/DL (ref 70–99)
GLUCOSE BLD-MCNC: 177 MG/DL (ref 70–99)
GLUCOSE SERPL-MCNC: 172 MG/DL (ref 70–99)
HBA1C MFR BLD: 8 % (ref 4.2–6.3)
HCT VFR BLD CALC: 25.9 % (ref 42–52)
HEMOGLOBIN: 8.1 GM/DL (ref 13.5–18)
IMMATURE NEUTROPHIL %: 0.2 % (ref 0–0.43)
LYMPHOCYTES ABSOLUTE: 2.1 K/CU MM
LYMPHOCYTES RELATIVE PERCENT: 34.3 % (ref 24–44)
MAGNESIUM: 1.6 MG/DL (ref 1.8–2.4)
MCH RBC QN AUTO: 30.6 PG (ref 27–31)
MCHC RBC AUTO-ENTMCNC: 31.3 % (ref 32–36)
MCV RBC AUTO: 97.7 FL (ref 78–100)
MONOCYTES ABSOLUTE: 0.7 K/CU MM
MONOCYTES RELATIVE PERCENT: 11.3 % (ref 0–4)
NEUTROPHILS ABSOLUTE: 3 K/CU MM
NEUTROPHILS RELATIVE PERCENT: 50.1 % (ref 36–66)
NUCLEATED RBC %: 0 %
PDW BLD-RTO: 15.3 % (ref 11.7–14.9)
PHOSPHORUS: 3.2 MG/DL (ref 2.5–4.9)
PLATELET # BLD: 182 K/CU MM (ref 140–440)
PMV BLD AUTO: 12.8 FL (ref 7.5–11.1)
POTASSIUM SERPL-SCNC: 5.1 MMOL/L (ref 3.5–5.1)
RBC # BLD: 2.65 M/CU MM (ref 4.6–6.2)
SODIUM BLD-SCNC: 137 MMOL/L (ref 135–145)
TOTAL IMMATURE NEUTOROPHIL: 0.01 K/CU MM
TOTAL NUCLEATED RBC: 0 K/CU MM
TOTAL PROTEIN: 5.8 GM/DL (ref 6.4–8.2)
WBC # BLD: 6 K/CU MM (ref 4–10.5)

## 2024-05-13 PROCEDURE — 84100 ASSAY OF PHOSPHORUS: CPT

## 2024-05-13 PROCEDURE — 2580000003 HC RX 258: Performed by: SPECIALIST

## 2024-05-13 PROCEDURE — 36415 COLL VENOUS BLD VENIPUNCTURE: CPT

## 2024-05-13 PROCEDURE — C1769 GUIDE WIRE: HCPCS | Performed by: SPECIALIST

## 2024-05-13 PROCEDURE — 2709999900 HC NON-CHARGEABLE SUPPLY: Performed by: SPECIALIST

## 2024-05-13 PROCEDURE — 3600000013 HC SURGERY LEVEL 3 ADDTL 15MIN: Performed by: SPECIALIST

## 2024-05-13 PROCEDURE — 3700000000 HC ANESTHESIA ATTENDED CARE: Performed by: SPECIALIST

## 2024-05-13 PROCEDURE — 6370000000 HC RX 637 (ALT 250 FOR IP): Performed by: SPECIALIST

## 2024-05-13 PROCEDURE — 83735 ASSAY OF MAGNESIUM: CPT

## 2024-05-13 PROCEDURE — 2580000003 HC RX 258: Performed by: NURSE ANESTHETIST, CERTIFIED REGISTERED

## 2024-05-13 PROCEDURE — 94640 AIRWAY INHALATION TREATMENT: CPT

## 2024-05-13 PROCEDURE — 3700000001 HC ADD 15 MINUTES (ANESTHESIA): Performed by: SPECIALIST

## 2024-05-13 PROCEDURE — 82962 GLUCOSE BLOOD TEST: CPT

## 2024-05-13 PROCEDURE — C2617 STENT, NON-COR, TEM W/O DEL: HCPCS | Performed by: SPECIALIST

## 2024-05-13 PROCEDURE — C1758 CATHETER, URETERAL: HCPCS | Performed by: SPECIALIST

## 2024-05-13 PROCEDURE — 0T778DZ DILATION OF LEFT URETER WITH INTRALUMINAL DEVICE, VIA NATURAL OR ARTIFICIAL OPENING ENDOSCOPIC: ICD-10-PCS | Performed by: SPECIALIST

## 2024-05-13 PROCEDURE — 2580000003 HC RX 258: Performed by: STUDENT IN AN ORGANIZED HEALTH CARE EDUCATION/TRAINING PROGRAM

## 2024-05-13 PROCEDURE — 85025 COMPLETE CBC W/AUTO DIFF WBC: CPT

## 2024-05-13 PROCEDURE — 6360000002 HC RX W HCPCS: Performed by: SPECIALIST

## 2024-05-13 PROCEDURE — 94761 N-INVAS EAR/PLS OXIMETRY MLT: CPT

## 2024-05-13 PROCEDURE — 3600000003 HC SURGERY LEVEL 3 BASE: Performed by: SPECIALIST

## 2024-05-13 PROCEDURE — 76000 FLUOROSCOPY <1 HR PHYS/QHP: CPT

## 2024-05-13 PROCEDURE — 0TP98DZ REMOVAL OF INTRALUMINAL DEVICE FROM URETER, VIA NATURAL OR ARTIFICIAL OPENING ENDOSCOPIC: ICD-10-PCS | Performed by: SPECIALIST

## 2024-05-13 PROCEDURE — 94664 DEMO&/EVAL PT USE INHALER: CPT

## 2024-05-13 PROCEDURE — 6370000000 HC RX 637 (ALT 250 FOR IP): Performed by: STUDENT IN AN ORGANIZED HEALTH CARE EDUCATION/TRAINING PROGRAM

## 2024-05-13 PROCEDURE — 80053 COMPREHEN METABOLIC PANEL: CPT

## 2024-05-13 PROCEDURE — 6360000002 HC RX W HCPCS: Performed by: STUDENT IN AN ORGANIZED HEALTH CARE EDUCATION/TRAINING PROGRAM

## 2024-05-13 DEVICE — VARIABLE LENGTH URETERAL STENT
Type: IMPLANTABLE DEVICE | Site: URETER | Status: FUNCTIONAL
Brand: CONTOUR VL™

## 2024-05-13 RX ORDER — ONDANSETRON 2 MG/ML
4 INJECTION INTRAMUSCULAR; INTRAVENOUS
Status: CANCELLED | OUTPATIENT
Start: 2024-05-13 | End: 2024-05-14

## 2024-05-13 RX ORDER — SODIUM CHLORIDE 0.9 % (FLUSH) 0.9 %
5-40 SYRINGE (ML) INJECTION PRN
Status: CANCELLED | OUTPATIENT
Start: 2024-05-13

## 2024-05-13 RX ORDER — HYDRALAZINE HYDROCHLORIDE 25 MG/1
25 TABLET, FILM COATED ORAL ONCE
Status: COMPLETED | OUTPATIENT
Start: 2024-05-13 | End: 2024-05-13

## 2024-05-13 RX ORDER — SODIUM CHLORIDE 0.9 % (FLUSH) 0.9 %
5-40 SYRINGE (ML) INJECTION EVERY 12 HOURS SCHEDULED
Status: CANCELLED | OUTPATIENT
Start: 2024-05-13

## 2024-05-13 RX ORDER — SODIUM CHLORIDE 9 MG/ML
INJECTION, SOLUTION INTRAVENOUS PRN
Status: CANCELLED | OUTPATIENT
Start: 2024-05-13

## 2024-05-13 RX ORDER — BLOOD PRESSURE TEST KIT
1 KIT MISCELLANEOUS ONCE
Qty: 1 KIT | Refills: 0 | Status: ON HOLD | OUTPATIENT
Start: 2024-05-13 | End: 2024-05-13

## 2024-05-13 RX ORDER — SODIUM CHLORIDE 9 MG/ML
INJECTION, SOLUTION INTRAVENOUS CONTINUOUS PRN
Status: DISCONTINUED | OUTPATIENT
Start: 2024-05-13 | End: 2024-05-13 | Stop reason: SDUPTHER

## 2024-05-13 RX ORDER — NALOXONE HYDROCHLORIDE 0.4 MG/ML
INJECTION, SOLUTION INTRAMUSCULAR; INTRAVENOUS; SUBCUTANEOUS PRN
Status: CANCELLED | OUTPATIENT
Start: 2024-05-13

## 2024-05-13 RX ORDER — ENOXAPARIN SODIUM 100 MG/ML
30 INJECTION SUBCUTANEOUS DAILY
Status: DISCONTINUED | OUTPATIENT
Start: 2024-05-14 | End: 2024-05-13 | Stop reason: HOSPADM

## 2024-05-13 RX ORDER — FENTANYL CITRATE 50 UG/ML
25 INJECTION, SOLUTION INTRAMUSCULAR; INTRAVENOUS EVERY 5 MIN PRN
Status: CANCELLED | OUTPATIENT
Start: 2024-05-13

## 2024-05-13 RX ORDER — MAGNESIUM SULFATE IN WATER 40 MG/ML
2000 INJECTION, SOLUTION INTRAVENOUS ONCE
Status: COMPLETED | OUTPATIENT
Start: 2024-05-13 | End: 2024-05-13

## 2024-05-13 RX ORDER — CIPROFLOXACIN 250 MG/1
250 TABLET, FILM COATED ORAL 2 TIMES DAILY
Qty: 4 TABLET | Refills: 0 | Status: CANCELLED | OUTPATIENT
Start: 2024-05-13 | End: 2024-05-15

## 2024-05-13 RX ORDER — CIPROFLOXACIN 500 MG/1
250 TABLET, FILM COATED ORAL 2 TIMES DAILY
Qty: 2 TABLET | Refills: 0 | Status: SHIPPED | OUTPATIENT
Start: 2024-05-13 | End: 2024-05-15

## 2024-05-13 RX ORDER — ALBUTEROL SULFATE 90 UG/1
1 AEROSOL, METERED RESPIRATORY (INHALATION) EVERY 4 HOURS PRN
Status: DISCONTINUED | OUTPATIENT
Start: 2024-05-13 | End: 2024-05-13 | Stop reason: HOSPADM

## 2024-05-13 RX ADMIN — POLYETHYLENE GLYCOL (3350) 17 G: 17 POWDER, FOR SOLUTION ORAL at 15:28

## 2024-05-13 RX ADMIN — SODIUM CHLORIDE, PRESERVATIVE FREE 10 ML: 5 INJECTION INTRAVENOUS at 08:52

## 2024-05-13 RX ADMIN — SODIUM CHLORIDE: 9 INJECTION, SOLUTION INTRAVENOUS at 14:23

## 2024-05-13 RX ADMIN — ASPIRIN 81 MG: 81 TABLET, CHEWABLE ORAL at 15:27

## 2024-05-13 RX ADMIN — CIPROFLOXACIN 400 MG: 400 INJECTION, SOLUTION INTRAVENOUS at 00:11

## 2024-05-13 RX ADMIN — ALBUTEROL SULFATE 1 PUFF: 90 AEROSOL, METERED RESPIRATORY (INHALATION) at 07:31

## 2024-05-13 RX ADMIN — HYDRALAZINE HYDROCHLORIDE 25 MG: 25 TABLET ORAL at 16:35

## 2024-05-13 RX ADMIN — MAGNESIUM SULFATE HEPTAHYDRATE 2000 MG: 40 INJECTION, SOLUTION INTRAVENOUS at 08:52

## 2024-05-13 RX ADMIN — FERROUS SULFATE TAB 325 MG (65 MG ELEMENTAL FE) 325 MG: 325 (65 FE) TAB at 15:27

## 2024-05-13 RX ADMIN — SENNOSIDES 17.2 MG: 8.6 TABLET, FILM COATED ORAL at 15:27

## 2024-05-13 RX ADMIN — CEFAZOLIN 2000 MG: 2 INJECTION, POWDER, FOR SOLUTION INTRAMUSCULAR; INTRAVENOUS at 14:33

## 2024-05-13 RX ADMIN — AMLODIPINE BESYLATE 10 MG: 10 TABLET ORAL at 15:27

## 2024-05-13 ASSESSMENT — PAIN SCALES - GENERAL
PAINLEVEL_OUTOF10: 0

## 2024-05-13 ASSESSMENT — PAIN - FUNCTIONAL ASSESSMENT: PAIN_FUNCTIONAL_ASSESSMENT: 0-10

## 2024-05-13 NOTE — ANESTHESIA PRE PROCEDURE
1995                                Counseling given: Not Answered      Vital Signs (Current):   Vitals:    05/13/24 0800 05/13/24 0849 05/13/24 1030 05/13/24 1133   BP: (!) 161/68   (!) 161/64   Pulse: 59 57 59 55   Resp: 18   16   Temp: 97.5 °F (36.4 °C)   97.8 °F (36.6 °C)   TempSrc: Oral   Oral   SpO2: 99%   99%   Weight:       Height:                                                  BP Readings from Last 3 Encounters:   05/13/24 (!) 161/64   04/16/24 (!) 144/122   03/22/24 136/84       NPO Status: Time of last liquid consumption: 2000                        Time of last solid consumption: 2000                        Date of last liquid consumption: 05/12/24                        Date of last solid food consumption: 05/13/24    BMI:   Wt Readings from Last 3 Encounters:   05/13/24 80.6 kg (177 lb 12.8 oz)   03/22/24 84.7 kg (186 lb 12.8 oz)   03/09/24 83.6 kg (184 lb 6.4 oz)     Body mass index is 27.03 kg/m².    CBC:   Lab Results   Component Value Date/Time    WBC 6.0 05/13/2024 02:23 AM    RBC 2.65 05/13/2024 02:23 AM    HGB 8.1 05/13/2024 02:23 AM    HCT 25.9 05/13/2024 02:23 AM    MCV 97.7 05/13/2024 02:23 AM    RDW 15.3 05/13/2024 02:23 AM     05/13/2024 02:23 AM       CMP:   Lab Results   Component Value Date/Time     05/13/2024 02:23 AM    K 5.1 05/13/2024 02:23 AM    K 3.9 02/12/2018 04:42 AM     05/13/2024 02:23 AM    CO2 22 05/13/2024 02:23 AM    BUN 48 05/13/2024 02:23 AM    CREATININE 2.1 05/13/2024 02:23 AM    GFRAA 31 10/13/2022 06:30 AM    LABGLOM 30 05/13/2024 02:23 AM    LABGLOM 25 04/16/2024 09:46 AM    LABGLOM 30 12/13/2023 10:12 AM    GLUCOSE 172 05/13/2024 02:23 AM    PROT 7.6 10/27/2012 03:30 AM    CALCIUM 7.9 05/13/2024 02:23 AM    BILITOT 0.2 05/13/2024 02:23 AM    ALKPHOS 82 05/13/2024 02:23 AM    AST 12 05/13/2024 02:23 AM    ALT 9 05/13/2024 02:23 AM       POC Tests:   Recent Labs     05/13/24  1121   POCGLU 172*       Coags:   Lab Results   Component Value

## 2024-05-13 NOTE — PLAN OF CARE
Problem: Discharge Planning  Goal: Discharge to home or other facility with appropriate resources  5/13/2024 1725 by [place] RN  Outcome: Adequate for Discharge  5/13/2024 0331 by Roldan Elliott, RN  Outcome: Progressing     Problem: Safety - Adult  Goal: Free from fall injury  5/13/2024 1725 by [facility] [place] RN  Outcome: Adequate for Discharge  5/13/2024 0331 by Roldan Elliott, RN  Outcome: Progressing     Problem: Pain  Goal: Verbalizes/displays adequate comfort level or baseline comfort level  Outcome: Adequate for Discharge     Problem: Chronic Conditions and Co-morbidities  Goal: Patient's chronic conditions and co-morbidity symptoms are monitored and maintained or improved  Outcome: Adequate for Discharge      Results/recommendations conveyed to patient as noted below per Iliana Ramirez NP.  Pt is aware that she will get a call to discuss her referral.  Patient verbalized understanding of everything discussed and denies questions or concerns.

## 2024-05-13 NOTE — DISCHARGE SUMMARY
positive nitrite, large leukocyte esterase  -Patient's status post ceftriaxone in the ED and 1 L IV NS bolus  -Will continue gentle IV hydration, will start patient with ciprofloxacin based on previous urine cultures positive for Pseudomonas resistant to ceftriaxone but sensitive to ciprofloxacin  -Suprapubic catheter will need to be changed  -Follow blood cultures/urine cultures     Essential hypertension-continue amlodipine  Insulin-dependent diabetes mellitus-glucose levels in 400s in the ED likely from dehydration, improving to 300 status post hydration/insulin regular 10 units.  Continue low-dose sliding scale insulin, with hypoglycemic protocol.  Follow A1c  Mixed hyperlipidemia-continue statin  History of recent admission with fecal impaction and stercoral colitis    The patient expressed appropriate understanding of, and agreement with the discharge recommendations, medications, and plan.     Consults this admission:  IP CONSULT TO NEPHROLOGY  IP CONSULT TO UROLOGY  IP CONSULT TO ENDOCRINOLOGY    Discharge Diagnosis:   Hyperkalemia        Discharge Instruction:   Follow up appointments: PCP/ Nephrology  Primary care physician: Jacobo Zazueta MD within 2 weeks  Diet: renal diet   Activity: activity as tolerated  Disposition: Discharged to:   [x]Home, [][facility], []SNF, []Acute Rehab, []Hospice   Condition on discharge: Stable  Labs and Tests to be Followed up as an outpatient by PCP or Specialist: BMP in a week, rest as indicated    Discharge Medications:        Medication List        START taking these medications      Blood Pressure Kit  1 kit by Does not apply route once for 1 dose     ciprofloxacin 500 MG tablet  Commonly known as: CIPRO  Take 0.5 tablets by mouth 2 times daily for 2 days     glucose 4 g chewable tablet  Take 4 tablets by mouth as needed for Low blood sugar            CHANGE how you take these medications      aspirin 81 MG chewable tablet  Take 1 tablet by mouth daily  What changed: when

## 2024-05-13 NOTE — DISCHARGE INSTRUCTIONS
palpitations, any new weakness, numbness or tingling, please come to the nearest healthcare facility for evaluation by physician     Please go through Printed reading material and feel free to reach out in case of any queries, concerns or issues per contact provided

## 2024-05-13 NOTE — CARE COORDINATION
Spoke with pt. From home with son. Uses standard walker for ADLs. Recently had aide, privately contracted (denied using [facility]), but states that the aide found a new job during previous hospitalization. Pt's son (Greg Lora - in [place]) is supposed to be looking for new aide. Pt had [facility] on previous admission, but services were not restarted at discharged. Pt does not want HC again as he feels they do not stay long enough to do significant work. Discussed SNF, but does not want inpt stay. Discussed outpatient PT/OT. Pt is agreeable, but wants to talk to his sons post discharge about transportation as pt does not drive. Pt requested transportation services. He is active with S. CM to add transport resources to AVS and bring to pt's room.  Pt plans discharge home with no needs at this time. Denies needs. CM is available if needed.   05/13/24 0856   Service Assessment   Patient Orientation Alert and Oriented   Cognition Alert   History Provided By Patient;Medical Record   Primary Caregiver Self   Support Systems Children   Patient's Healthcare Decision Maker is: Legal Next of Kin   PCP Verified by CM Yes   Last Visit to PCP Within last 3 months   Prior Functional Level Independent in ADLs/IADLs   Current Functional Level Assistance with the following:;Bathing;Shopping;Housework;Cooking   Can patient return to prior living arrangement Yes   Ability to make needs known: Good   Family able to assist with home care needs: Other (comment)  (limited)   Would you like for me to discuss the discharge plan with any other family members/significant others, and if so, who? No   Financial Resources Medicare   Community Resources None   Social/Functional History   Lives With Son   Type of Home House

## 2024-05-13 NOTE — PROGRESS NOTES
Discharge education completed with pt and son, pt demonstrated appropriate teach back, IV's removed, pt has prescription called in to pharmacy, medication's and side effect education completed, pt has discharge paperwork,  pt denies any needs or questions at this time.    
Dr Coto is ok with a blood pressure of 160/58 and states to discharge pt  
Dr Coto stated to give hydralazine, Recheck blood pressure in one hour and if it is less than 160 systolic to discharge  
Nephrology Progress Note  5/13/2024 9:40 AM        Subjective:   Admit Date: 5/11/2024  PCP: Jacobo Zazueta MD    Interval History: Patient seen in early morning, this is a late entry  No major event overnight    Diet: Reasonable appetite but he might be nothing by mouth for ureteral stent exchange    ROS: No shortness of breath, fever chills or rigor, blood sugar acceptable  Urine output recorded about 1.4 L for the last 24 hours  No fever, variable blood pressure recorded but acceptable    Data:     Current meds:    magnesium sulfate  2,000 mg IntraVENous Once    insulin glargine  15 Units SubCUTAneous Nightly    insulin lispro  0-8 Units SubCUTAneous TID WC    sodium zirconium cyclosilicate  10 g Oral TID    amLODIPine  10 mg Oral Daily    aspirin  81 mg Oral Daily    atorvastatin  20 mg Oral Nightly    carvedilol  25 mg Oral BID    ferrous sulfate  325 mg Oral Daily with breakfast    levothyroxine  75 mcg Oral Daily    pantoprazole  40 mg Oral QAM AC    polyethylene glycol  17 g Oral TID    senna  2 tablet Oral Daily    sodium chloride flush  5-40 mL IntraVENous 2 times per day    enoxaparin  40 mg SubCUTAneous Daily    insulin lispro  10 Units SubCUTAneous TID WC    insulin lispro  0-8 Units SubCUTAneous 2 times per day    ciprofloxacin  400 mg IntraVENous Q24H      dextrose      sodium chloride           I/O last 3 completed shifts:  In: 210 [P.O.:200; I.V.:10]  Out: 2400 [Urine:2400]    CBC:   Recent Labs     05/11/24  1504 05/12/24  0826 05/13/24  0223   WBC 9.6 7.7 6.0   HGB 8.8* 8.4* 8.1*    197 182          Recent Labs     05/11/24  2339 05/12/24  0826 05/13/24  0223   * 135 137   K 5.0 5.4* 5.1    105 104   CO2 18* 20* 22   BUN 49* 46* 48*   CREATININE 2.2* 2.1* 2.1*   GLUCOSE 271* 141* 172*       Lab Results   Component Value Date    CALCIUM 7.9 (L) 05/13/2024    PHOS 3.2 05/13/2024       Objective:     Vitals: BP (!) 161/68   Pulse 57   Temp 97.5 °F (36.4 °C) (Oral)   Resp 18 
RENAL DOSE ADJUSTMENT MADE PER P/T PROTOCOL    PREVIOUS ORDER:  Ciprofloxacin 400 mg q12h    Estimated Creatinine Clearance: 22 mL/min (A) (based on SCr of 2.4 mg/dL (H)).  Recent Labs     05/11/24  1504   BUN 52*   CREATININE 2.4*        NEW RENALLY ADJUSTED ORDER:  Ciprofloxacin 400 mg q24h    Mainoradam Raymundo [facility]  5/11/2024 11:20 PM      
Received a call from Dr. Garcia made him aware paitent had episode of hypoglycemia and patient will go for procedure tomorrow and he need to be NPO. He said not to give insulin while patient is NPO.  
deconditioning     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 (HCC)     Frequent falls     Chronic kidney disease, stage 3a (HCC)     Uncontrolled type 2 diabetes mellitus with hyperglycemia (HCC)     Prostate cancer (HCC)     Suprapubic catheter (HCC)     Sepsis due to urinary tract infection (HCC)     Coagulase negative Staphylococcus bacteremia     Chronic kidney disease, stage IV (severe) (HCC)     Acute metabolic encephalopathy     SVT (supraventricular tachycardia) (HCC)     Acute metabolic encephalopathy due to hypoglycemia     History of prostate cancer     Cigarette nicotine dependence without complication     Altered mental status     Serratia marcescens infection     Hypoglycemia     Fungemia     Pyelonephritis     Bacteremia due to Enterococcus     Chronic combined systolic (congestive) and diastolic (congestive) heart failure (HCC)     Chronic idiopathic constipation     Constipation     Fecal impaction (HCC)     Stercoral colitis      Plan:     Reviewed POC blood glucose . Labs and X ray results   Reviewed Current Medicines   On meal/ Correction bolus Humalog/ Basal Lantus Insulin regime   Monitor Blood glucose frequently   Modified  the dose of Insulin/ other medicines as needed  Urologist plan to remove ureteral stent   Will follow     .     JUANCHO Garcia MD, MD  
pantoprazole  40 mg Oral QAM AC    polyethylene glycol  17 g Oral TID    senna  2 tablet Oral Daily    sodium chloride flush  5-40 mL IntraVENous 2 times per day    enoxaparin  40 mg SubCUTAneous Daily    insulin lispro  10 Units SubCUTAneous TID WC    insulin lispro  0-8 Units SubCUTAneous 2 times per day    ciprofloxacin  400 mg IntraVENous Q24H      Infusions:    dextrose      sodium chloride       PRN Meds: bisacodyl, 10 mg, Daily PRN  SUMAtriptan, 50 mg, Daily PRN  magnesium hydroxide, 30 mL, Daily PRN  glucose, 4 tablet, PRN  dextrose bolus, 125 mL, PRN   Or  dextrose bolus, 250 mL, PRN  glucagon (rDNA), 1 mg, PRN  dextrose, , Continuous PRN  sodium chloride flush, 5-40 mL, PRN  sodium chloride, , PRN  potassium chloride, 40 mEq, PRN   Or  potassium alternative oral replacement, 40 mEq, PRN   Or  potassium chloride, 10 mEq, PRN  ondansetron, 4 mg, Q8H PRN   Or  ondansetron, 4 mg, Q6H PRN  polyethylene glycol, 17 g, Daily PRN  acetaminophen, 650 mg, Q6H PRN   Or  acetaminophen, 650 mg, Q6H PRN        Labs and Imaging   No results found.    CBC:   Recent Labs     05/11/24  1504   WBC 9.6   HGB 8.8*        BMP:    Recent Labs     05/11/24  1504 05/11/24  2339   * 133*   K 6.2* 5.0    102   CO2 20* 18*   BUN 52* 49*   CREATININE 2.4* 2.2*   GLUCOSE 391* 271*     Hepatic:   Recent Labs     05/11/24  1504   AST 13*   ALT 11   BILITOT 0.2   ALKPHOS 100     Lipids:   Lab Results   Component Value Date/Time    CHOL 171 05/30/2023 06:48 AM    HDL 43 05/30/2023 06:48 AM    TRIG 129 05/30/2023 06:48 AM     Hemoglobin A1C:   Lab Results   Component Value Date/Time    LABA1C 6.3 02/02/2024 02:55 AM     TSH: No results found for: \"TSH\"  Troponin:   Lab Results   Component Value Date/Time    TROPONINT 0.038 05/21/2023 07:14 PM    TROPONINT 0.044 10/24/2022 12:20 PM    TROPONINT 0.030 04/20/2022 07:30 AM     Lactic Acid: No results for input(s): \"LACTA\" in the last 72 hours.  BNP: No results for input(s): 
cystoscopy, left ureteral stent exchange this afternoon. NPO since MN.    2) Chronic Urinary Retention: Managed with chronic SPT, last exchanged 5/12/24.   Recommend SPT exchanges q3 weeks.     3) Chronic Cystitis: Urine cx with mixed pathogens, likely contaminated. On IV Cipro. Abx per primary.    4) H/o Prostate Cancer: S/p RPP with Dr. Jose in 1996. PSA 0.93 2/2021. On Eligard q6 months.     Will follow.  Patient seen and examined, chart reviewed.     Electronically signed by Jon Cherry PA-C on 5/13/2024 at 10:15 AM

## 2024-05-13 NOTE — CONSULTS
Patient:   Greg Easton    Date:  24  :  1940, 83 y.o.   Nephrologist: Kade Suarez MD  Provider: Jacobo Zazueta MD    Reason for Consult: Chronic kidney disease stage G4 with hyperkalemia    Consult requested by : Brett Hand MD       Chief Complaint:   High blood sugar    HISTORY OF PRESENT ILLNESS/Brief hospital course  AND  brief background history    Mr. Easton, is a 83-year young male, who was driven to the emergency department by his son with high blood sugar.    On arrival in the emergency department, he was afebrile and hemodynamic stable with oxygen saturation 100% while breathing ambient air, initial kidney function showed sodium 133, but at the face of 391 blood glucose, potassium 6.2, blood urea nitrogen increase to 52 and creatinine 2.4 mg/dL respectively.  Serum bicarbonate was 28.  Other pertinent abnormal lab include hemoglobin of 8.8, urinalysis showed glucose 500, negative ketone with normal beta-hydroxybutyrate level in the serum, trace proteinuria and positive nitrite, urine microscopy showed 30 RBC and 80 WBC.    He was treated medically for high potassium with intravenous calcium gluconate, insulin and dextrose infusion, appropriate bodily fluid culture were drawn, and broad-spectrum antimicrobial agent were initiated.  Additionally oral potassium binders were added.  He was subsequently admitted to medical floor for further evaluation and management.  He did receive 1 L of crystalloid solution namely normal saline    When I saw him this morning he is alert awake and oriented blood sugar much better, creatinine and potassium also improved.    I am of course very familiar with him.  I first met him in 2022, when he was admitted with confusion, and sustained an acute kidney injury-thought to be from obstruction and infection as he has recurrent left hydronephrosis of unknown etiology.  I have been following him since then.  He does have chronic 
RENAL DOSE ADJUSTMENT MADE PER P/T PROTOCOL    PREVIOUS ORDER:  Enoxaparin 40mg subq daily    Estimated Creatinine Clearance: 25 mL/min (A) (based on SCr of 2.1 mg/dL (H)).  Recent Labs     05/11/24  2339 05/12/24  0826 05/13/24  0223   BUN 49* 46* 48*   CREATININE 2.2* 2.1* 2.1*   PLT  --  197 182     NEW RENALLY ADJUSTED ORDER:  Enoxaparin 30mg subq daily    Ankit Castellanos [facility]  5/13/2024 10:55 AM      
Normal  Extremities: Normal, peripheral pulses normal, , has no edema   NEUROLOGIC:  Awake, alert, oriented to name, place and time.  Cranial nerves II-XII are grossly intact.  Motor is  intact.  Sensory is intact. ,  and gait is normal.    DATA:    CBC:   Recent Labs     05/11/24  1504   WBC 9.6   HGB 8.8*       CMP:  Recent Labs     05/11/24  1504   *   K 6.2*      CO2 20*   BUN 52*   CREATININE 2.4*   CALCIUM 8.7   BILITOT 0.2   ALKPHOS 100   AST 13*   ALT 11     Lipids:   Lab Results   Component Value Date/Time    CHOL 171 05/30/2023 06:48 AM    HDL 43 05/30/2023 06:48 AM    TRIG 129 05/30/2023 06:48 AM     Glucose:   Recent Labs     05/11/24  1343 05/11/24  1556 05/11/24  1903   POCGLU 438* 351* 238*     Hemoglobin A1C:   Lab Results   Component Value Date/Time    LABA1C 6.3 02/02/2024 02:55 AM     Free T4:   Lab Results   Component Value Date/Time    T4FREE 1.55 05/24/2023 01:26 AM     Free T3: No results found for: \"FT3\"  TSH High Sensitivity:   Lab Results   Component Value Date/Time    TSHHS 3.070 02/02/2024 02:55 AM       No orders to display          Scheduled Medicines   Medications:    sodium zirconium cyclosilicate  10 g Oral TID    albuterol sulfate HFA  1 puff Inhalation 4x Daily    amLODIPine  10 mg Oral Daily    aspirin  81 mg Oral Daily    atorvastatin  20 mg Oral Nightly    carvedilol  25 mg Oral BID    [START ON 5/12/2024] ferrous sulfate  325 mg Oral Daily with breakfast    insulin lispro  0-8 Units SubCUTAneous TID WC    [START ON 5/12/2024] levothyroxine  75 mcg Oral Daily    [START ON 5/12/2024] pantoprazole  40 mg Oral QAM AC    polyethylene glycol  17 g Oral TID    senna  2 tablet Oral Daily    sodium chloride flush  5-40 mL IntraVENous 2 times per day    enoxaparin  40 mg SubCUTAneous Daily    ciprofloxacin  400 mg IntraVENous Q12H    insulin lispro  10 Units SubCUTAneous TID WC    insulin glargine  20 Units SubCUTAneous Nightly    insulin lispro  0-8 Units SubCUTAneous 
MN to see if this is feasible for 5/13/24.  18 Mexican SPT exchanged 5/12/24    Will follow.      Patient seen and examined, chart reviewed.     Bal Mckeon MD     Electronically signed at 5/12/2024

## 2024-05-13 NOTE — BRIEF OP NOTE
Brief Postoperative Note      Patient: Greg Easton  YOB: 1940  MRN: [id number]    Date of Procedure: 5/13/2024    Pre-Op Diagnosis Codes:     * Kidney stone [N20.0]    Post-Op Diagnosis: Same       Procedure(s):  CYSTOSCOPY URETERAL STENT EXCHANGE    Surgeon(s):  Mirella Wilkins MD    Assistant:  * No surgical staff found *    Anesthesia: General    Estimated Blood Loss (mL): Minimal    Complications: None    Specimens:   * No specimens in log *    Implants:  Implant Name Type Inv. Item Serial No.  Lot No. LRB No. Used Action   STENT URET 4.8FR [id number] PERCFLX HYDR+ SFT PGTL TAPR TIP - [id number]  STENT URET 4.8FR [id number] PERCFLX HYDR+ SFT PGTL TAPR TIP  [employer] UROLOGY-WD [id number] Left 1 Implanted         Drains: * No LDAs found *    Findings:  Infection Present At Time Of Surgery (PATOS) (choose all levels that have infection present):  No infection present  Other Findings: uneventful stent exchange    Electronically signed by Mirella Wilkins MD on 5/13/2024 at 2:50 PM

## 2024-05-13 NOTE — CARE COORDINATION
CM was unable to find transportation with pt's insurance. Pt has [employer]. Transportation offered with them. CM printed resources and placed on pt's AVS. Explained resources and highlighted information for pt.

## 2024-05-13 NOTE — OP NOTE
Operative Note      Patient: Greg Easton  YOB: 1940  MRN: [id number]    Date of Procedure: 5/13/2024    Pre-Op Diagnosis Codes:     * Kidney stone [N20.0]    Post-Op Diagnosis: Same       Procedure(s):  CYSTOSCOPY URETERAL STENT EXCHANGE    Surgeon(s):  Mirella Wilkins MD    Assistant:   * No surgical staff found *    Anesthesia: General    Estimated Blood Loss (mL): Minimal    Complications: None    Specimens:   * No specimens in log *    Implants:  Implant Name Type Inv. Item Serial No.  Lot No. LRB No. Used Action   STENT URET 4.8FR [id number] PERCFLX HYDR+ SFT PGTL TAPR TIP - [id number]  STENT URET 4.8FR [id number] PERCFLX HYDR+ SFT PGTL TAPR TIP  [employer] UROLOGY-WD [id number] Left 1 Implanted         Drains: * No LDAs found *    Findings:  Infection Present At Time Of Surgery (PATOS) (choose all levels that have infection present):  No infection present  Other Findings: uneventful left stent exchange    Detailed Description of Procedure:   This is a 83-year-old male with indwelling left ureteral stent who is overdue for stent exchange.    Description of procedure: Patient finally taken procedure placed in a dorsolithotomy position patient generator was prepped draped sterile fashion.  21 Korean cystoscopy she was induced per urethra capitalization.  Left ureter identified grasped removed intact sensor wire is passed left ureter and a new 4.8 Korean very length double-J ureteral stent passed over guidewire standard fashion physical form using fluoroscopy cystoscopy deemed good patient bladder emptied taken cover entire procedure well.    Electronically signed by Mirella Wilkins MD on 5/13/2024 at 2:52 PM

## 2024-05-13 NOTE — ANESTHESIA POSTPROCEDURE EVALUATION
Department of Anesthesiology  Postprocedure Note    Patient: Greg Easton  MRN: [id number]  YOB: 1940  Date of evaluation: 5/13/2024    Procedure Summary       Date: 05/13/24 Room / Location: [address]    Anesthesia Start: 1423 Anesthesia Stop:     Procedure: CYSTOSCOPY URETERAL STENT EXCHANGE (Left) Diagnosis:       Kidney stone      (Kidney stone [N20.0])    Surgeons: Mirella Wilkins MD Responsible Provider: Bairon Galeano MD    Anesthesia Type: MAC ASA Status: 3            Anesthesia Type: No value filed.    Jackeline Phase I: Jackeline Score: 10    Jackeline Phase II:      Anesthesia Post Evaluation    Patient location during evaluation: bedside  Patient participation: complete - patient participated  Level of consciousness: awake and alert  Pain score: 0  Airway patency: patent  Nausea & Vomiting: no vomiting and no nausea  Cardiovascular status: blood pressure returned to baseline and hemodynamically stable  Respiratory status: acceptable, room air, spontaneous ventilation and nonlabored ventilation  Hydration status: stable  Pain management: adequate    No notable events documented.

## 2024-05-13 NOTE — RT PROTOCOL NOTE
RT Inhaler-Nebulizer Bronchodilator Protocol Note    There is a bronchodilator order in the chart from a provider indicating to follow the RT Bronchodilator Protocol and there is an “Initiate RT Inhaler-Nebulizer Bronchodilator Protocol” order as well (see protocol at bottom of note).    CXR Findings:  No results found.    The findings from the last RT Protocol Assessment were as follows:   History Pulmonary Disease: Smoker 15 pack years or more  Respiratory Pattern: Regular pattern and RR 12-20 bpm  Breath Sounds: Slightly diminished and/or crackles  Cough: Strong, spontaneous, non-productive  Indication for Bronchodilator Therapy: On home bronchodilators (Patient sttes he does not take it on a schedule, take albuterol as needed, but not often)  Bronchodilator Assessment Score: 3    Aerosolized bronchodilator medication orders have been revised according to the RT Inhaler-Nebulizer Bronchodilator Protocol below.    Respiratory Therapist to perform RT Therapy Protocol Assessment initially then follow the protocol.  Repeat RT Therapy Protocol Assessment PRN for score 0-3 or on second treatment, BID, and PRN for scores above 3.    No Indications - adjust the frequency to every 6 hours PRN wheezing or bronchospasm, if no treatments needed after 48 hours then discontinue using Per Protocol order mode.     If indication present, adjust the RT bronchodilator orders based on the Bronchodilator Assessment Score as indicated below.  Use Inhaler orders unless patient has one or more of the following: on home nebulizer, not able to hold breath for 10 seconds, is not alert and oriented, cannot activate and use MDI correctly, or respiratory rate 25 breaths per minute or more, then use the equivalent nebulizer order(s) with same Frequency and PRN reasons based on the score.  If a patient is on this medication at home then do not decrease Frequency below that used at home.    0-3 - enter or revise RT bronchodilator order(s) to

## 2024-05-14 ENCOUNTER — APPOINTMENT (OUTPATIENT)
Dept: CT IMAGING | Age: 84
DRG: 699 | End: 2024-05-14
Payer: MEDICARE

## 2024-05-14 ENCOUNTER — APPOINTMENT (OUTPATIENT)
Dept: GENERAL RADIOLOGY | Age: 84
DRG: 699 | End: 2024-05-14
Payer: MEDICARE

## 2024-05-14 ENCOUNTER — APPOINTMENT (OUTPATIENT)
Dept: ULTRASOUND IMAGING | Age: 84
DRG: 699 | End: 2024-05-14
Payer: MEDICARE

## 2024-05-14 ENCOUNTER — HOSPITAL ENCOUNTER (INPATIENT)
Age: 84
LOS: 2 days | Discharge: NURSING, REHAB OR LONG-TERM CARE | DRG: 699 | End: 2024-05-18
Attending: EMERGENCY MEDICINE | Admitting: FAMILY MEDICINE
Payer: MEDICARE

## 2024-05-14 DIAGNOSIS — N18.4 CKD STAGE 4 DUE TO TYPE 2 DIABETES MELLITUS (HCC): ICD-10-CM

## 2024-05-14 DIAGNOSIS — S09.90XA CLOSED HEAD INJURY, INITIAL ENCOUNTER: ICD-10-CM

## 2024-05-14 DIAGNOSIS — R55 SYNCOPE, UNSPECIFIED SYNCOPE TYPE: ICD-10-CM

## 2024-05-14 DIAGNOSIS — E11.22 CKD STAGE 4 DUE TO TYPE 2 DIABETES MELLITUS (HCC): ICD-10-CM

## 2024-05-14 DIAGNOSIS — R55 SYNCOPE AND COLLAPSE: Primary | ICD-10-CM

## 2024-05-14 DIAGNOSIS — R79.89 ELEVATED TROPONIN: ICD-10-CM

## 2024-05-14 LAB
ALBUMIN SERPL-MCNC: 3.7 GM/DL (ref 3.4–5)
ALP BLD-CCNC: 85 IU/L (ref 40–128)
ALT SERPL-CCNC: 7 U/L (ref 10–40)
AMORPHOUS: ABNORMAL /HPF
ANION GAP SERPL CALCULATED.3IONS-SCNC: 11 MMOL/L (ref 7–16)
AST SERPL-CCNC: 12 IU/L (ref 15–37)
BACTERIA: ABNORMAL /HPF
BASOPHILS ABSOLUTE: 0 K/CU MM
BASOPHILS RELATIVE PERCENT: 0.4 % (ref 0–1)
BILIRUB SERPL-MCNC: 0.2 MG/DL (ref 0–1)
BILIRUBIN, URINE: NEGATIVE MG/DL
BLOOD, URINE: ABNORMAL
BUN SERPL-MCNC: 44 MG/DL (ref 6–23)
CALCIUM SERPL-MCNC: 8.5 MG/DL (ref 8.3–10.6)
CHLORIDE BLD-SCNC: 105 MMOL/L (ref 99–110)
CLARITY: ABNORMAL
CO2: 22 MMOL/L (ref 21–32)
COLOR: YELLOW
CREAT SERPL-MCNC: 2.4 MG/DL (ref 0.9–1.3)
DIFFERENTIAL TYPE: ABNORMAL
EKG ATRIAL RATE: 59 BPM
EKG DIAGNOSIS: NORMAL
EKG P AXIS: 54 DEGREES
EKG P-R INTERVAL: 240 MS
EKG Q-T INTERVAL: 414 MS
EKG QRS DURATION: 76 MS
EKG QTC CALCULATION (BAZETT): 409 MS
EKG R AXIS: -11 DEGREES
EKG T AXIS: 9 DEGREES
EKG VENTRICULAR RATE: 59 BPM
EOSINOPHILS ABSOLUTE: 0.5 K/CU MM
EOSINOPHILS RELATIVE PERCENT: 6.7 % (ref 0–3)
GFR, ESTIMATED: 26 ML/MIN/1.73M2
GLUCOSE BLD-MCNC: 148 MG/DL (ref 70–99)
GLUCOSE BLD-MCNC: 214 MG/DL (ref 70–99)
GLUCOSE BLD-MCNC: 88 MG/DL (ref 70–99)
GLUCOSE SERPL-MCNC: 204 MG/DL (ref 70–99)
GLUCOSE URINE: 500 MG/DL
HCT VFR BLD CALC: 28.9 % (ref 42–52)
HEMOGLOBIN: 8.9 GM/DL (ref 13.5–18)
IMMATURE NEUTROPHIL %: 0.6 % (ref 0–0.43)
KETONES, URINE: NEGATIVE MG/DL
LACTATE: 1 MMOL/L (ref 0.5–1.9)
LEUKOCYTE ESTERASE, URINE: ABNORMAL
LIPASE: 38 IU/L (ref 13–60)
LYMPHOCYTES ABSOLUTE: 1.3 K/CU MM
LYMPHOCYTES RELATIVE PERCENT: 19.1 % (ref 24–44)
MAGNESIUM: 2 MG/DL (ref 1.8–2.4)
MCH RBC QN AUTO: 30.2 PG (ref 27–31)
MCHC RBC AUTO-ENTMCNC: 30.8 % (ref 32–36)
MCV RBC AUTO: 98 FL (ref 78–100)
MONOCYTES ABSOLUTE: 0.5 K/CU MM
MONOCYTES RELATIVE PERCENT: 7.3 % (ref 0–4)
NEUTROPHILS ABSOLUTE: 4.6 K/CU MM
NEUTROPHILS RELATIVE PERCENT: 65.9 % (ref 36–66)
NITRITE URINE, QUANTITATIVE: POSITIVE
NUCLEATED RBC %: 0 %
PDW BLD-RTO: 15.3 % (ref 11.7–14.9)
PH, URINE: 7.5 (ref 5–8)
PLATELET # BLD: 198 K/CU MM (ref 140–440)
PMV BLD AUTO: 11.7 FL (ref 7.5–11.1)
POTASSIUM SERPL-SCNC: 4.9 MMOL/L (ref 3.5–5.1)
PRO-BNP: 2063 PG/ML
PROTEIN UA: 100 MG/DL
RBC # BLD: 2.95 M/CU MM (ref 4.6–6.2)
RBC URINE: 65 /HPF (ref 0–3)
SODIUM BLD-SCNC: 138 MMOL/L (ref 135–145)
SPECIFIC GRAVITY UA: 1.02 (ref 1–1.03)
SQUAMOUS EPITHELIAL: 7 /HPF
TOTAL CK: 43 IU/L (ref 38–174)
TOTAL IMMATURE NEUTOROPHIL: 0.04 K/CU MM
TOTAL NUCLEATED RBC: 0 K/CU MM
TOTAL PROTEIN: 7.1 GM/DL (ref 6.4–8.2)
TRICHOMONAS: ABNORMAL /HPF
TROPONIN, HIGH SENSITIVITY: 36 NG/L (ref 0–22)
TROPONIN, HIGH SENSITIVITY: 39 NG/L (ref 0–22)
TROPONIN, HIGH SENSITIVITY: 40 NG/L (ref 0–22)
UROBILINOGEN, URINE: 0.2 MG/DL (ref 0.2–1)
WBC # BLD: 7 K/CU MM (ref 4–10.5)
WBC CLUMP: ABNORMAL /HPF
WBC UA: 218 /HPF (ref 0–2)
YEAST: ABNORMAL /HPF

## 2024-05-14 PROCEDURE — 84484 ASSAY OF TROPONIN QUANT: CPT

## 2024-05-14 PROCEDURE — 93010 ELECTROCARDIOGRAM REPORT: CPT | Performed by: INTERNAL MEDICINE

## 2024-05-14 PROCEDURE — 70486 CT MAXILLOFACIAL W/O DYE: CPT

## 2024-05-14 PROCEDURE — 74176 CT ABD & PELVIS W/O CONTRAST: CPT

## 2024-05-14 PROCEDURE — 83880 ASSAY OF NATRIURETIC PEPTIDE: CPT

## 2024-05-14 PROCEDURE — 82550 ASSAY OF CK (CPK): CPT

## 2024-05-14 PROCEDURE — G0378 HOSPITAL OBSERVATION PER HR: HCPCS | Performed by: NURSE PRACTITIONER

## 2024-05-14 PROCEDURE — 2580000003 HC RX 258: Performed by: HOSPITALIST

## 2024-05-14 PROCEDURE — G0378 HOSPITAL OBSERVATION PER HR: HCPCS

## 2024-05-14 PROCEDURE — 71045 X-RAY EXAM CHEST 1 VIEW: CPT

## 2024-05-14 PROCEDURE — 96360 HYDRATION IV INFUSION INIT: CPT

## 2024-05-14 PROCEDURE — 87088 URINE BACTERIA CULTURE: CPT

## 2024-05-14 PROCEDURE — 81001 URINALYSIS AUTO W/SCOPE: CPT

## 2024-05-14 PROCEDURE — 80053 COMPREHEN METABOLIC PANEL: CPT

## 2024-05-14 PROCEDURE — 73130 X-RAY EXAM OF HAND: CPT

## 2024-05-14 PROCEDURE — 87186 SC STD MICRODIL/AGAR DIL: CPT

## 2024-05-14 PROCEDURE — 83735 ASSAY OF MAGNESIUM: CPT

## 2024-05-14 PROCEDURE — 85025 COMPLETE CBC W/AUTO DIFF WBC: CPT

## 2024-05-14 PROCEDURE — 76376 3D RENDER W/INTRP POSTPROCES: CPT

## 2024-05-14 PROCEDURE — 83690 ASSAY OF LIPASE: CPT

## 2024-05-14 PROCEDURE — 87086 URINE CULTURE/COLONY COUNT: CPT

## 2024-05-14 PROCEDURE — 99285 EMERGENCY DEPT VISIT HI MDM: CPT

## 2024-05-14 PROCEDURE — 93880 EXTRACRANIAL BILAT STUDY: CPT

## 2024-05-14 PROCEDURE — 93005 ELECTROCARDIOGRAM TRACING: CPT

## 2024-05-14 PROCEDURE — 82962 GLUCOSE BLOOD TEST: CPT

## 2024-05-14 PROCEDURE — 72125 CT NECK SPINE W/O DYE: CPT

## 2024-05-14 PROCEDURE — 70450 CT HEAD/BRAIN W/O DYE: CPT

## 2024-05-14 PROCEDURE — 83605 ASSAY OF LACTIC ACID: CPT

## 2024-05-14 PROCEDURE — 6370000000 HC RX 637 (ALT 250 FOR IP): Performed by: HOSPITALIST

## 2024-05-14 PROCEDURE — 72128 CT CHEST SPINE W/O DYE: CPT

## 2024-05-14 RX ORDER — BISACODYL 10 MG
10 SUPPOSITORY, RECTAL RECTAL DAILY PRN
Status: DISCONTINUED | OUTPATIENT
Start: 2024-05-14 | End: 2024-05-18 | Stop reason: HOSPADM

## 2024-05-14 RX ORDER — ACETAMINOPHEN 325 MG/1
650 TABLET ORAL EVERY 6 HOURS PRN
Status: DISCONTINUED | OUTPATIENT
Start: 2024-05-14 | End: 2024-05-18 | Stop reason: HOSPADM

## 2024-05-14 RX ORDER — AMLODIPINE BESYLATE 10 MG/1
10 TABLET ORAL DAILY
Status: DISCONTINUED | OUTPATIENT
Start: 2024-05-15 | End: 2024-05-16

## 2024-05-14 RX ORDER — SODIUM CHLORIDE 0.9 % (FLUSH) 0.9 %
5-40 SYRINGE (ML) INJECTION PRN
Status: DISCONTINUED | OUTPATIENT
Start: 2024-05-14 | End: 2024-05-18 | Stop reason: HOSPADM

## 2024-05-14 RX ORDER — ONDANSETRON 4 MG/1
4 TABLET, ORALLY DISINTEGRATING ORAL EVERY 8 HOURS PRN
Status: DISCONTINUED | OUTPATIENT
Start: 2024-05-14 | End: 2024-05-18 | Stop reason: HOSPADM

## 2024-05-14 RX ORDER — ASPIRIN 81 MG/1
81 TABLET, CHEWABLE ORAL NIGHTLY
Status: DISCONTINUED | OUTPATIENT
Start: 2024-05-14 | End: 2024-05-18 | Stop reason: HOSPADM

## 2024-05-14 RX ORDER — PANTOPRAZOLE SODIUM 40 MG/1
40 TABLET, DELAYED RELEASE ORAL
Status: DISCONTINUED | OUTPATIENT
Start: 2024-05-15 | End: 2024-05-18 | Stop reason: HOSPADM

## 2024-05-14 RX ORDER — ATORVASTATIN CALCIUM 10 MG/1
20 TABLET, FILM COATED ORAL NIGHTLY
Status: DISCONTINUED | OUTPATIENT
Start: 2024-05-14 | End: 2024-05-18 | Stop reason: HOSPADM

## 2024-05-14 RX ORDER — CARVEDILOL 25 MG/1
25 TABLET ORAL 2 TIMES DAILY
Status: DISCONTINUED | OUTPATIENT
Start: 2024-05-14 | End: 2024-05-18 | Stop reason: HOSPADM

## 2024-05-14 RX ORDER — VITAMIN B COMPLEX
2000 TABLET ORAL DAILY
Status: DISCONTINUED | OUTPATIENT
Start: 2024-05-15 | End: 2024-05-18 | Stop reason: HOSPADM

## 2024-05-14 RX ORDER — SENNOSIDES A AND B 8.6 MG/1
2 TABLET, FILM COATED ORAL DAILY
Status: DISCONTINUED | OUTPATIENT
Start: 2024-05-15 | End: 2024-05-18 | Stop reason: HOSPADM

## 2024-05-14 RX ORDER — POTASSIUM CHLORIDE 20 MEQ/1
40 TABLET, EXTENDED RELEASE ORAL PRN
Status: DISCONTINUED | OUTPATIENT
Start: 2024-05-14 | End: 2024-05-18 | Stop reason: HOSPADM

## 2024-05-14 RX ORDER — POLYETHYLENE GLYCOL 3350 17 G/17G
17 POWDER, FOR SOLUTION ORAL DAILY PRN
Status: DISCONTINUED | OUTPATIENT
Start: 2024-05-14 | End: 2024-05-18 | Stop reason: HOSPADM

## 2024-05-14 RX ORDER — INSULIN LISPRO 100 [IU]/ML
0-4 INJECTION, SOLUTION INTRAVENOUS; SUBCUTANEOUS NIGHTLY
Status: DISCONTINUED | OUTPATIENT
Start: 2024-05-14 | End: 2024-05-15

## 2024-05-14 RX ORDER — ONDANSETRON 2 MG/ML
4 INJECTION INTRAMUSCULAR; INTRAVENOUS EVERY 6 HOURS PRN
Status: DISCONTINUED | OUTPATIENT
Start: 2024-05-14 | End: 2024-05-18 | Stop reason: HOSPADM

## 2024-05-14 RX ORDER — LEVOTHYROXINE SODIUM 0.07 MG/1
75 TABLET ORAL DAILY
Status: DISCONTINUED | OUTPATIENT
Start: 2024-05-15 | End: 2024-05-18 | Stop reason: HOSPADM

## 2024-05-14 RX ORDER — CIPROFLOXACIN 250 MG/1
250 TABLET, FILM COATED ORAL 2 TIMES DAILY
Status: COMPLETED | OUTPATIENT
Start: 2024-05-14 | End: 2024-05-16

## 2024-05-14 RX ORDER — ENOXAPARIN SODIUM 100 MG/ML
30 INJECTION SUBCUTANEOUS DAILY
Status: DISCONTINUED | OUTPATIENT
Start: 2024-05-15 | End: 2024-05-18 | Stop reason: HOSPADM

## 2024-05-14 RX ORDER — 0.9 % SODIUM CHLORIDE 0.9 %
1000 INTRAVENOUS SOLUTION INTRAVENOUS ONCE
Status: DISCONTINUED | OUTPATIENT
Start: 2024-05-14 | End: 2024-05-14

## 2024-05-14 RX ORDER — DEXTROSE AND SODIUM CHLORIDE 5; .45 G/100ML; G/100ML
INJECTION, SOLUTION INTRAVENOUS CONTINUOUS
Status: DISCONTINUED | OUTPATIENT
Start: 2024-05-14 | End: 2024-05-15

## 2024-05-14 RX ORDER — ALBUTEROL SULFATE 90 UG/1
1 AEROSOL, METERED RESPIRATORY (INHALATION) 4 TIMES DAILY
Status: DISCONTINUED | OUTPATIENT
Start: 2024-05-14 | End: 2024-05-15

## 2024-05-14 RX ORDER — SODIUM CHLORIDE 0.9 % (FLUSH) 0.9 %
5-40 SYRINGE (ML) INJECTION EVERY 12 HOURS SCHEDULED
Status: DISCONTINUED | OUTPATIENT
Start: 2024-05-14 | End: 2024-05-18 | Stop reason: HOSPADM

## 2024-05-14 RX ORDER — MORPHINE SULFATE 2 MG/ML
2 INJECTION, SOLUTION INTRAMUSCULAR; INTRAVENOUS ONCE
Status: DISCONTINUED | OUTPATIENT
Start: 2024-05-14 | End: 2024-05-18 | Stop reason: HOSPADM

## 2024-05-14 RX ORDER — ACETAMINOPHEN 325 MG/1
650 TABLET ORAL ONCE
Status: COMPLETED | OUTPATIENT
Start: 2024-05-14 | End: 2024-05-14

## 2024-05-14 RX ORDER — DEXTROSE MONOHYDRATE 100 MG/ML
INJECTION, SOLUTION INTRAVENOUS CONTINUOUS PRN
Status: DISCONTINUED | OUTPATIENT
Start: 2024-05-14 | End: 2024-05-18 | Stop reason: HOSPADM

## 2024-05-14 RX ORDER — GLUCAGON 1 MG/ML
1 KIT INJECTION PRN
Status: DISCONTINUED | OUTPATIENT
Start: 2024-05-14 | End: 2024-05-18 | Stop reason: HOSPADM

## 2024-05-14 RX ORDER — SODIUM CHLORIDE 9 MG/ML
INJECTION, SOLUTION INTRAVENOUS PRN
Status: DISCONTINUED | OUTPATIENT
Start: 2024-05-14 | End: 2024-05-18 | Stop reason: HOSPADM

## 2024-05-14 RX ORDER — INSULIN LISPRO 100 [IU]/ML
0-4 INJECTION, SOLUTION INTRAVENOUS; SUBCUTANEOUS
Status: DISCONTINUED | OUTPATIENT
Start: 2024-05-15 | End: 2024-05-15

## 2024-05-14 RX ORDER — ACETAMINOPHEN 650 MG/1
650 SUPPOSITORY RECTAL EVERY 6 HOURS PRN
Status: DISCONTINUED | OUTPATIENT
Start: 2024-05-14 | End: 2024-05-18 | Stop reason: HOSPADM

## 2024-05-14 RX ORDER — ONDANSETRON 2 MG/ML
4 INJECTION INTRAMUSCULAR; INTRAVENOUS EVERY 30 MIN PRN
Status: DISCONTINUED | OUTPATIENT
Start: 2024-05-14 | End: 2024-05-14 | Stop reason: HOSPADM

## 2024-05-14 RX ORDER — POTASSIUM CHLORIDE 7.45 MG/ML
10 INJECTION INTRAVENOUS PRN
Status: DISCONTINUED | OUTPATIENT
Start: 2024-05-14 | End: 2024-05-18 | Stop reason: HOSPADM

## 2024-05-14 RX ORDER — FERROUS SULFATE 325(65) MG
325 TABLET ORAL
Status: DISCONTINUED | OUTPATIENT
Start: 2024-05-15 | End: 2024-05-18 | Stop reason: HOSPADM

## 2024-05-14 RX ADMIN — SODIUM CHLORIDE, PRESERVATIVE FREE 10 ML: 5 INJECTION INTRAVENOUS at 21:50

## 2024-05-14 RX ADMIN — DEXTROSE AND SODIUM CHLORIDE: 5; 450 INJECTION, SOLUTION INTRAVENOUS at 22:50

## 2024-05-14 RX ADMIN — ATORVASTATIN CALCIUM 20 MG: 10 TABLET, FILM COATED ORAL at 22:37

## 2024-05-14 RX ADMIN — ASPIRIN 81 MG: 81 TABLET, CHEWABLE ORAL at 22:47

## 2024-05-14 RX ADMIN — CARVEDILOL 25 MG: 25 TABLET, FILM COATED ORAL at 22:36

## 2024-05-14 RX ADMIN — ACETAMINOPHEN 650 MG: 325 TABLET ORAL at 22:45

## 2024-05-14 RX ADMIN — CIPROFLOXACIN HYDROCHLORIDE 250 MG: 250 TABLET, FILM COATED ORAL at 22:36

## 2024-05-14 ASSESSMENT — ENCOUNTER SYMPTOMS
VOMITING: 1
DIARRHEA: 0
SHORTNESS OF BREATH: 0
COUGH: 0
WHEEZING: 0
SORE THROAT: 0
NAUSEA: 1
RHINORRHEA: 0

## 2024-05-14 ASSESSMENT — PAIN - FUNCTIONAL ASSESSMENT
PAIN_FUNCTIONAL_ASSESSMENT: 0-10
PAIN_FUNCTIONAL_ASSESSMENT: ACTIVITIES ARE NOT PREVENTED

## 2024-05-14 ASSESSMENT — PAIN SCALES - GENERAL
PAINLEVEL_OUTOF10: 5
PAINLEVEL_OUTOF10: 0

## 2024-05-14 ASSESSMENT — PAIN DESCRIPTION - DESCRIPTORS: DESCRIPTORS: ACHING

## 2024-05-14 ASSESSMENT — PAIN DESCRIPTION - ORIENTATION: ORIENTATION: MID

## 2024-05-14 ASSESSMENT — PAIN DESCRIPTION - LOCATION: LOCATION: HEAD

## 2024-05-14 NOTE — ED TRIAGE NOTES
Patient to rm. 31 via EMS with c/o syncopal episode at home. Patient states he was walking down the hallway and collapsed. Patient is currently A &O x 4. States he hit his head but denies blood thinners. Denies any other injuries. Resps even and unlabored.

## 2024-05-14 NOTE — PROGRESS NOTES
Syncopal episode  Hypoglycemic   N/v  Ckd4  Elevated trop  ORTHO+  CR 2.4  GLUC UP  BNP 2K  TROP 39  HGB 8.9  CT A/P+  CT C SPINE+  CT LS-  CT FACE+  CXR+  CTH-    DISCHRGE YESTERDAY  FEELING FINE  GOT UP THIS AM  TOOK INSULING  HAD NAUSEAS AND VOMITE  PASSED OUT AND HIT LEFT SIDE AND FACE  SON HELPED AN FELL ON TOP OF HIM  CAME IN LETHARGIC KIND OF OUT OF IT  CONCERN FOR BLOOD GLUCOSE  CKD SLIGHTLY WORSE  NO FLUID  GLUCOSE 88  FEELING BETTER, STILL HAVING PAIN FROM FALL  HDS  MED/SURGE        Hyperkalemia-resolved  CKD stage IIIb/IV- baseline cr  Possible suprapubic catheter related UTI:  Essential hypertension  Insulin-dependent diabetes mellitus  Mixed hyperlipidemia  History of recent admission with fecal impaction and stercoral colitis

## 2024-05-14 NOTE — ED NOTES
ED TO INPATIENT SBAR HANDOFF    Patient Name: Greg Easton   :  1940  83 y.o.   Preferred Name  Greg  Family/Caregiver Present yes   Restraints no   C-SSRS: Risk of Suicide: No Risk  Sitter no   Sepsis Risk Score Sepsis Risk Score: 2.64      Situation  Chief Complaint   Patient presents with    Loss of Consciousness     Denies hitting head, denies blood thinners, feel at home when getting up.      Brief Description of Patient's Condition: Pt presents to the ED for LOC, hitting his head denies blood thinners, independent at baseline.   Mental Status: oriented, alert, coherent, logical, thought processes intact, and able to concentrate and follow conversation  Arrived from: home    Imaging:   CT ABDOMEN PELVIS WO CONTRAST Additional Contrast? None   Final Result      1.  Progressive mild right hydroureteronephrosis.   2.  No change. Atrophic left kidney. Left double-J ureteral stent, well positioned, no adjacent stones.   3.  Well-positioned suprapubic catheter within a completely decompressed urinary bladder.   4.  No acute fracture.      Electronically signed by Santiago Moore DO      CT LUMBAR RECONSTRUCTION WO POST PROCESS   Final Result      No CT evidence for acute lumbar spine fracture      Electronically signed by Santiago Moore DO      CT THORACIC SPINE WO CONTRAST   Final Result      No CT evidence for acute thoracic spine fracture      Electronically signed by Santiago Moore DO      CT CERVICAL SPINE WO CONTRAST   Final Result      1.  No change. No CT evidence for acute cervical spine fracture.    2.  No change. C3-C7 posterior fusion hardware.      Electronically signed by Santiago Moore DO      CT MAXILLOFACIAL WO CONTRAST   Final Result      1.  No change. Mild deformity left zygomatic arch.   2.  No change. Paranasal sinus disease. Evaluate for superimposed fungal process.      Electronically signed by Santiago Moore DO      CT HEAD WO CONTRAST   Final Result      No change. No acute  intracranial hemorrhage.       Electronically signed by Santiago Moore DO      XR HAND LEFT (MIN 3 VIEWS)   Final Result      No acute fracture      Electronically signed by Santiago Moore DO      XR CHEST PORTABLE   Final Result      1. No acute disease.  No change.  Interstitial lung disease.      Electronically signed by Santiago Moore DO        Abnormal labs:   Abnormal Labs Reviewed   CBC WITH AUTO DIFFERENTIAL - Abnormal; Notable for the following components:       Result Value    RBC 2.95 (*)     Hemoglobin 8.9 (*)     Hematocrit 28.9 (*)     MCHC 30.8 (*)     RDW 15.3 (*)     MPV 11.7 (*)     Lymphocytes % 19.1 (*)     Monocytes % 7.3 (*)     Eosinophils % 6.7 (*)     Immature Neutrophil % 0.6 (*)     All other components within normal limits   COMPREHENSIVE METABOLIC PANEL - Abnormal; Notable for the following components:    Glucose 204 (*)     BUN 44 (*)     Creatinine 2.4 (*)     Est, Glom Filt Rate 26 (*)     ALT 7 (*)     AST 12 (*)     All other components within normal limits   TROPONIN - Abnormal; Notable for the following components:    Troponin, High Sensitivity 36 (*)     All other components within normal limits   URINALYSIS WITH REFLEX TO CULTURE - Abnormal; Notable for the following components:    Clarity, UA TURBID (*)     Glucose, Ur 500 (*)     Blood, Urine LARGE NUMBER OR AMOUNT OBSERVED (*)     Protein,  (*)     Nitrite Urine, Quantitative POSITIVE (*)     Leukocyte Esterase, Urine LARGE NUMBER OR AMOUNT OBSERVED (*)     All other components within normal limits   BRAIN NATRIURETIC PEPTIDE - Abnormal; Notable for the following components:    Pro-BNP 2,063 (*)     All other components within normal limits   TROPONIN - Abnormal; Notable for the following components:    Troponin, High Sensitivity 39 (*)     All other components within normal limits   POCT GLUCOSE - Abnormal; Notable for the following components:    POC Glucose 214 (*)     All other components within normal limits

## 2024-05-14 NOTE — ED PROVIDER NOTES
[facility]  EMERGENCY DEPARTMENT ENCOUNTER        Pt Name: Greg Easton  MRN: [id number]  Birthdate 1940  Date of evaluation: 5/14/2024  Provider: VIVIANA Fuentes - JOHNNY  PCP: Jacobo Zazueta MD  Note Started: 3:48 PM EDT 5/14/24      NENA. I have evaluated this patient.        CHIEF COMPLAINT       Chief Complaint   Patient presents with    Loss of Consciousness     Denies hitting head, denies blood thinners, feel at home when getting up.        HISTORY OF PRESENT ILLNESS: 1 or more Elements     History From: Patient and son at bedside    Limitations to history : None    Social Determinants Significantly Affecting Health : None    Chief Complaint: Loss of consciousness    Greg Easton is a 83 y.o. male history of discharge from the hospital yesterday, insulin-dependent diabetes, CKD stage IV, HTN, CHF, anemia, left ureteral stent exchange yesterday Dr. Maribel flores who presents to ED stating he was getting ready for a doctor's appointment.  He states around 155 this afternoon he took his insulin.  He states he got up to get ready and put his shoes on and his coat.  States he was going to eat a sandwich his son was going to bring him but he had not ate it yet.  States as he was standing there he felt the room go black and he fell and hit his left side of his face and head onto the wall his son tried to catch him and landed on top of him.  States just prior to losing consciousness he was having nausea and vomiting.  States he regained consciousness immediately and continued to have nausea and vomiting did have a loss of bowel movement during the syncopal episode.  States he was going to a doctor's appointment to follow-up for his recent hospitalization.  Denies any fevers nausea vomiting prior to this.  Currently denies any chest pain, potation's shortness of breath or abdominal pain.  States he is having a headache from his fall as well as facial pain.  Denies any visual changes.    Nursing Notes  No change. C3-C7 posterior fusion hardware.      Electronically signed by Santiago Moore DO      CT MAXILLOFACIAL WO CONTRAST   Final Result      1.  No change. Mild deformity left zygomatic arch.   2.  No change. Paranasal sinus disease. Evaluate for superimposed fungal process.      Electronically signed by Santiago Moore DO      CT HEAD WO CONTRAST   Final Result      No change. No acute intracranial hemorrhage.       Electronically signed by Santiago Moore DO      XR HAND LEFT (MIN 3 VIEWS)   Final Result      No acute fracture      Electronically signed by Santiago Moore DO      XR CHEST PORTABLE   Final Result      1. No acute disease.  No change.  Interstitial lung disease.      Electronically signed by Santiago Moore DO        FL LESS THAN 1 HOUR    Result Date: 5/13/2024  Radiology exam is complete. No Radiologist dictation. Please follow up with ordering provider.        No results found.      PAST MEDICAL HISTORY      has a past medical history of Acute kidney failure (HCC), Acute urinary tract infection (03/15/2012), Anemia, Ataxia (01/01/2010), Ataxia, Bacteremia, Candidiasis, Chronic combined systolic and diastolic congestive heart failure (HCC), Chronic kidney disease, Cognitive communication deficit, Diabetes mellitus ([facility]) (01/01/2011), Extended spectrum beta lactamase (ESBL) resistance, Fusion of spine of cervical region (10/01/2012), Gait disturbance (01/01/2011), History of prostate cancer (01/01/1996), History of tobacco use (01/01/1959), Hydronephrosis, Hyperkalemia, Hyperlipidemia (01/01/2011), Hypertension, Hypertensive heart disease with CHF (congestive heart failure) ([facility]), Hypotension, Immunodeficiency ([facility]), Metabolic encephalopathy, Muscle weakness, Osteoarthritis (01/01/2011), Osteoarthritis, 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     EMERGENCY DEPARTMENT COURSE and DIFFERENTIAL DIAGNOSIS/MDM:   Vitals:    Vitals:

## 2024-05-14 NOTE — ED PROVIDER NOTES
I Dr. Jude Ryan DVenkateshOVenkatesh am the primary physician of record. I personally saw the patient and made/approved the management plan and take responsibility for the patient management. I independently examined and evaluated Greg Easton.    In brief their history revealed a 83 y.o. male history of discharge from the hospital yesterday, insulin-dependent diabetes, CKD stage IV, HTN, CHF, anemia, left ureteral stent exchange yesterday Dr. Maribel flores who presents to ED stating he was getting ready for a doctor's appointment.  He states around 155 this afternoon he took his insulin.  He states he got up to get ready and put his shoes on and his coat.  States he was going to eat a sandwich his son was going to bring him but he had not ate it yet.  States as he was standing there he felt the room go black and he fell and hit his left side of his face and head onto the wall his son tried to catch him and landed on top of him.  States just prior to losing consciousness he was having nausea and vomiting.  States he regained consciousness immediately and continued to have nausea and vomiting did have a loss of bowel movement during the syncopal episode.  States he was going to a doctor's appointment to follow-up for his recent hospitalization.  Denies any fevers nausea vomiting prior to this.  Currently denies any chest pain, potation's shortness of breath or abdominal pain.  States he is having a headache from his fall as well as facial pain.  Denies any visual changes.       Their focused exam revealed alert and oriented male resting in bed no distress normocephalic, atraumatic, sclera clear lungs clear heart regular rate rhythm 2+ pulses throughout abdomen soft nontender bowel sounds present normal.  5-5 strength throughout skin has no rash or swelling cranials grossly intact pupils equal reactive.    ED course/MDM: Patient seen with NP please see her note.  Patient with complaint of loss of consciousness.  Patient is a

## 2024-05-15 ENCOUNTER — APPOINTMENT (OUTPATIENT)
Dept: NON INVASIVE DIAGNOSTICS | Age: 84
DRG: 699 | End: 2024-05-15
Attending: HOSPITALIST
Payer: MEDICARE

## 2024-05-15 LAB
ALBUMIN SERPL-MCNC: 3.1 GM/DL (ref 3.4–5)
ALP BLD-CCNC: 76 IU/L (ref 40–129)
ALT SERPL-CCNC: <5 U/L (ref 10–40)
ANION GAP SERPL CALCULATED.3IONS-SCNC: 11 MMOL/L (ref 7–16)
AST SERPL-CCNC: 11 IU/L (ref 15–37)
BASOPHILS ABSOLUTE: 0 K/CU MM
BASOPHILS RELATIVE PERCENT: 0.5 % (ref 0–1)
BILIRUB SERPL-MCNC: 0.1 MG/DL (ref 0–1)
BUN SERPL-MCNC: 43 MG/DL (ref 6–23)
CALCIUM SERPL-MCNC: 8 MG/DL (ref 8.3–10.6)
CHLORIDE BLD-SCNC: 106 MMOL/L (ref 99–110)
CO2: 20 MMOL/L (ref 21–32)
CREAT SERPL-MCNC: 2.2 MG/DL (ref 0.9–1.3)
DIFFERENTIAL TYPE: ABNORMAL
ECHO AO ROOT DIAM: 3.5 CM
ECHO AO ROOT INDEX: 1.79 CM/M2
ECHO AV AREA PEAK VELOCITY: 2 CM2
ECHO AV AREA VTI: 2.3 CM2
ECHO AV AREA/BSA PEAK VELOCITY: 1 CM2/M2
ECHO AV AREA/BSA VTI: 1.2 CM2/M2
ECHO AV MEAN GRADIENT: 6 MMHG
ECHO AV MEAN VELOCITY: 1.2 M/S
ECHO AV PEAK GRADIENT: 10 MMHG
ECHO AV PEAK VELOCITY: 1.6 M/S
ECHO AV VELOCITY RATIO: 0.63
ECHO AV VTI: 32.5 CM
ECHO BSA: 1.97 M2
ECHO LA DIAMETER INDEX: 1.44 CM/M2
ECHO LA DIAMETER: 2.8 CM
ECHO LA TO AORTIC ROOT RATIO: 0.8
ECHO LV E' LATERAL VELOCITY: 10 CM/S
ECHO LV E' SEPTAL VELOCITY: 9 CM/S
ECHO LV EDV A4C: 90 ML
ECHO LV EDV INDEX A4C: 46 ML/M2
ECHO LV EJECTION FRACTION A4C: 58 %
ECHO LV ESV A4C: 38 ML
ECHO LV ESV INDEX A4C: 19 ML/M2
ECHO LV FRACTIONAL SHORTENING: 40 % (ref 28–44)
ECHO LV INTERNAL DIMENSION DIASTOLE INDEX: 2.21 CM/M2
ECHO LV INTERNAL DIMENSION DIASTOLIC: 4.3 CM (ref 4.2–5.9)
ECHO LV INTERNAL DIMENSION SYSTOLIC INDEX: 1.33 CM/M2
ECHO LV INTERNAL DIMENSION SYSTOLIC: 2.6 CM
ECHO LV IVSD: 1.1 CM (ref 0.6–1)
ECHO LV MASS 2D: 152.6 G (ref 88–224)
ECHO LV MASS INDEX 2D: 78.2 G/M2 (ref 49–115)
ECHO LV POSTERIOR WALL DIASTOLIC: 1 CM (ref 0.6–1)
ECHO LV RELATIVE WALL THICKNESS RATIO: 0.47
ECHO LVOT AREA: 3.1 CM2
ECHO LVOT AV VTI INDEX: 0.74
ECHO LVOT DIAM: 2 CM
ECHO LVOT MEAN GRADIENT: 2 MMHG
ECHO LVOT PEAK GRADIENT: 4 MMHG
ECHO LVOT PEAK VELOCITY: 1 M/S
ECHO LVOT STROKE VOLUME INDEX: 38.8 ML/M2
ECHO LVOT SV: 75.7 ML
ECHO LVOT VTI: 24.1 CM
ECHO MV A VELOCITY: 1.37 M/S
ECHO MV E VELOCITY: 1.04 M/S
ECHO MV E/A RATIO: 0.76
ECHO MV E/E' LATERAL: 10.4
ECHO MV E/E' RATIO (AVERAGED): 10.98
ECHO MV E/E' SEPTAL: 11.56
EOSINOPHILS ABSOLUTE: 0.6 K/CU MM
EOSINOPHILS RELATIVE PERCENT: 10.6 % (ref 0–3)
GFR, ESTIMATED: 29 ML/MIN/1.73M2
GLUCOSE BLD-MCNC: 115 MG/DL (ref 70–99)
GLUCOSE BLD-MCNC: 201 MG/DL (ref 70–99)
GLUCOSE BLD-MCNC: 252 MG/DL (ref 70–99)
GLUCOSE BLD-MCNC: 259 MG/DL (ref 70–99)
GLUCOSE SERPL-MCNC: 219 MG/DL (ref 70–99)
HCT VFR BLD CALC: 26.4 % (ref 42–52)
HEMOGLOBIN: 7.8 GM/DL (ref 13.5–18)
IMMATURE NEUTROPHIL %: 0.2 % (ref 0–0.43)
LYMPHOCYTES ABSOLUTE: 1.4 K/CU MM
LYMPHOCYTES RELATIVE PERCENT: 24 % (ref 24–44)
MCH RBC QN AUTO: 30 PG (ref 27–31)
MCHC RBC AUTO-ENTMCNC: 29.5 % (ref 32–36)
MCV RBC AUTO: 101.5 FL (ref 78–100)
MONOCYTES ABSOLUTE: 0.5 K/CU MM
MONOCYTES RELATIVE PERCENT: 9.3 % (ref 0–4)
NEUTROPHILS ABSOLUTE: 3.2 K/CU MM
NEUTROPHILS RELATIVE PERCENT: 55.4 % (ref 36–66)
NUCLEATED RBC %: 0 %
PDW BLD-RTO: 15.5 % (ref 11.7–14.9)
PLATELET # BLD: 168 K/CU MM (ref 140–440)
PMV BLD AUTO: 11.8 FL (ref 7.5–11.1)
POTASSIUM SERPL-SCNC: 4.9 MMOL/L (ref 3.5–5.1)
RBC # BLD: 2.6 M/CU MM (ref 4.6–6.2)
SODIUM BLD-SCNC: 137 MMOL/L (ref 135–145)
TOTAL IMMATURE NEUTOROPHIL: 0.01 K/CU MM
TOTAL NUCLEATED RBC: 0 K/CU MM
TOTAL PROTEIN: 5.3 GM/DL (ref 6.4–8.2)
TROPONIN, HIGH SENSITIVITY: 39 NG/L (ref 0–22)
TROPONIN, HIGH SENSITIVITY: 43 NG/L (ref 0–22)
TROPONIN, HIGH SENSITIVITY: 44 NG/L (ref 0–22)
WBC # BLD: 5.8 K/CU MM (ref 4–10.5)

## 2024-05-15 PROCEDURE — 6370000000 HC RX 637 (ALT 250 FOR IP): Performed by: HOSPITALIST

## 2024-05-15 PROCEDURE — 6360000002 HC RX W HCPCS: Performed by: HOSPITALIST

## 2024-05-15 PROCEDURE — 84484 ASSAY OF TROPONIN QUANT: CPT

## 2024-05-15 PROCEDURE — 93306 TTE W/DOPPLER COMPLETE: CPT | Performed by: INTERNAL MEDICINE

## 2024-05-15 PROCEDURE — 93306 TTE W/DOPPLER COMPLETE: CPT

## 2024-05-15 PROCEDURE — 96361 HYDRATE IV INFUSION ADD-ON: CPT

## 2024-05-15 PROCEDURE — 94640 AIRWAY INHALATION TREATMENT: CPT

## 2024-05-15 PROCEDURE — 96372 THER/PROPH/DIAG INJ SC/IM: CPT

## 2024-05-15 PROCEDURE — 80053 COMPREHEN METABOLIC PANEL: CPT

## 2024-05-15 PROCEDURE — G0378 HOSPITAL OBSERVATION PER HR: HCPCS

## 2024-05-15 PROCEDURE — 85025 COMPLETE CBC W/AUTO DIFF WBC: CPT

## 2024-05-15 PROCEDURE — 94761 N-INVAS EAR/PLS OXIMETRY MLT: CPT

## 2024-05-15 PROCEDURE — 99222 1ST HOSP IP/OBS MODERATE 55: CPT | Performed by: INTERNAL MEDICINE

## 2024-05-15 PROCEDURE — 36415 COLL VENOUS BLD VENIPUNCTURE: CPT

## 2024-05-15 PROCEDURE — 6370000000 HC RX 637 (ALT 250 FOR IP): Performed by: NURSE PRACTITIONER

## 2024-05-15 RX ORDER — INSULIN LISPRO 100 [IU]/ML
0-8 INJECTION, SOLUTION INTRAVENOUS; SUBCUTANEOUS
Status: DISCONTINUED | OUTPATIENT
Start: 2024-05-15 | End: 2024-05-18

## 2024-05-15 RX ORDER — ALBUTEROL SULFATE 90 UG/1
1 AEROSOL, METERED RESPIRATORY (INHALATION) EVERY 4 HOURS PRN
Status: DISCONTINUED | OUTPATIENT
Start: 2024-05-15 | End: 2024-05-18 | Stop reason: HOSPADM

## 2024-05-15 RX ORDER — MIDODRINE HYDROCHLORIDE 5 MG/1
5 TABLET ORAL
Status: DISCONTINUED | OUTPATIENT
Start: 2024-05-15 | End: 2024-05-15

## 2024-05-15 RX ORDER — INSULIN LISPRO 100 [IU]/ML
0-4 INJECTION, SOLUTION INTRAVENOUS; SUBCUTANEOUS NIGHTLY
Status: DISCONTINUED | OUTPATIENT
Start: 2024-05-15 | End: 2024-05-18 | Stop reason: HOSPADM

## 2024-05-15 RX ORDER — INSULIN LISPRO 100 [IU]/ML
5 INJECTION, SOLUTION INTRAVENOUS; SUBCUTANEOUS
Status: DISCONTINUED | OUTPATIENT
Start: 2024-05-15 | End: 2024-05-18 | Stop reason: HOSPADM

## 2024-05-15 RX ORDER — INSULIN GLARGINE 100 [IU]/ML
10 INJECTION, SOLUTION SUBCUTANEOUS NIGHTLY
Status: DISCONTINUED | OUTPATIENT
Start: 2024-05-15 | End: 2024-05-18 | Stop reason: HOSPADM

## 2024-05-15 RX ORDER — INSULIN LISPRO 100 [IU]/ML
5 INJECTION, SOLUTION INTRAVENOUS; SUBCUTANEOUS ONCE
Status: COMPLETED | OUTPATIENT
Start: 2024-05-15 | End: 2024-05-15

## 2024-05-15 RX ADMIN — ATORVASTATIN CALCIUM 20 MG: 10 TABLET, FILM COATED ORAL at 20:33

## 2024-05-15 RX ADMIN — Medication 2000 UNITS: at 10:35

## 2024-05-15 RX ADMIN — CIPROFLOXACIN HYDROCHLORIDE 250 MG: 250 TABLET, FILM COATED ORAL at 20:33

## 2024-05-15 RX ADMIN — CARVEDILOL 25 MG: 25 TABLET, FILM COATED ORAL at 20:33

## 2024-05-15 RX ADMIN — ENOXAPARIN SODIUM 30 MG: 100 INJECTION SUBCUTANEOUS at 10:35

## 2024-05-15 RX ADMIN — INSULIN GLARGINE 10 UNITS: 100 INJECTION, SOLUTION SUBCUTANEOUS at 20:32

## 2024-05-15 RX ADMIN — ALBUTEROL SULFATE 1 PUFF: 90 AEROSOL, METERED RESPIRATORY (INHALATION) at 11:20

## 2024-05-15 RX ADMIN — AMLODIPINE BESYLATE 10 MG: 10 TABLET ORAL at 10:35

## 2024-05-15 RX ADMIN — SENNOSIDES 17.2 MG: 8.6 TABLET, FILM COATED ORAL at 10:35

## 2024-05-15 RX ADMIN — LEVOTHYROXINE SODIUM 75 MCG: 0.07 TABLET ORAL at 06:04

## 2024-05-15 RX ADMIN — CARVEDILOL 25 MG: 25 TABLET, FILM COATED ORAL at 10:35

## 2024-05-15 RX ADMIN — ASPIRIN 81 MG: 81 TABLET, CHEWABLE ORAL at 20:33

## 2024-05-15 RX ADMIN — FERROUS SULFATE TAB 325 MG (65 MG ELEMENTAL FE) 325 MG: 325 (65 FE) TAB at 10:39

## 2024-05-15 RX ADMIN — INSULIN LISPRO 5 UNITS: 100 INJECTION, SOLUTION INTRAVENOUS; SUBCUTANEOUS at 14:08

## 2024-05-15 RX ADMIN — CIPROFLOXACIN HYDROCHLORIDE 250 MG: 250 TABLET, FILM COATED ORAL at 10:35

## 2024-05-15 RX ADMIN — PANTOPRAZOLE SODIUM 40 MG: 40 TABLET, DELAYED RELEASE ORAL at 06:04

## 2024-05-15 RX ADMIN — INSULIN LISPRO 5 UNITS: 100 INJECTION, SOLUTION INTRAVENOUS; SUBCUTANEOUS at 10:36

## 2024-05-15 RX ADMIN — ALBUTEROL SULFATE 1 PUFF: 90 AEROSOL, METERED RESPIRATORY (INHALATION) at 07:08

## 2024-05-15 RX ADMIN — INSULIN LISPRO 4 UNITS: 100 INJECTION, SOLUTION INTRAVENOUS; SUBCUTANEOUS at 14:08

## 2024-05-15 ASSESSMENT — PAIN SCALES - GENERAL
PAINLEVEL_OUTOF10: 0

## 2024-05-15 NOTE — RT PROTOCOL NOTE
RT Inhaler-Nebulizer Bronchodilator Protocol Note    There is a bronchodilator order in the chart from a provider indicating to follow the RT Bronchodilator Protocol and there is an “Initiate RT Inhaler-Nebulizer Bronchodilator Protocol” order as well (see protocol at bottom of note).    CXR Findings:  XR CHEST PORTABLE    Result Date: 5/14/2024  1. No acute disease.  No change.  Interstitial lung disease. Electronically signed by Santiago Moore DO      The findings from the last RT Protocol Assessment were as follows:   History Pulmonary Disease: Smoker 15 pack years or more (no copd dx found)  Respiratory Pattern: Regular pattern and RR 12-20 bpm  Breath Sounds: Slightly diminished and/or crackles  Cough: Strong, spontaneous, non-productive  Indication for Bronchodilator Therapy: On home bronchodilators (albuterol mdi x 1 puff  for shortness of breath at home)  Bronchodilator Assessment Score: 3    Aerosolized bronchodilator medication orders have been revised according to the RT Inhaler-Nebulizer Bronchodilator Protocol below.    Respiratory Therapist to perform RT Therapy Protocol Assessment initially then follow the protocol.  Repeat RT Therapy Protocol Assessment PRN for score 0-3 or on second treatment, BID, and PRN for scores above 3.    No Indications - adjust the frequency to every 6 hours PRN wheezing or bronchospasm, if no treatments needed after 48 hours then discontinue using Per Protocol order mode.     If indication present, adjust the RT bronchodilator orders based on the Bronchodilator Assessment Score as indicated below.  Use Inhaler orders unless patient has one or more of the following: on home nebulizer, not able to hold breath for 10 seconds, is not alert and oriented, cannot activate and use MDI correctly, or respiratory rate 25 breaths per minute or more, then use the equivalent nebulizer order(s) with same Frequency and PRN reasons based on the score.  If a patient is on this medication at  home then do not decrease Frequency below that used at home.    0-3 - enter or revise RT bronchodilator order(s) to equivalent RT Bronchodilator order with Frequency of every 4 hours PRN for wheezing or increased work of breathing using Per Protocol order mode.        4-6 - enter or revise RT Bronchodilator order(s) to two equivalent RT bronchodilator orders with one order with BID Frequency and one order with Frequency of every 4 hours PRN wheezing or increased work of breathing using Per Protocol order mode.        7-10 - enter or revise RT Bronchodilator order(s) to two equivalent RT bronchodilator orders with one order with TID Frequency and one order with Frequency of every 4 hours PRN wheezing or increased work of breathing using Per Protocol order mode.       11-13 - enter or revise RT Bronchodilator order(s) to one equivalent RT bronchodilator order with QID Frequency and an Albuterol order with Frequency of every 4 hours PRN wheezing or increased work of breathing using Per Protocol order mode.      Greater than 13 - enter or revise RT Bronchodilator order(s) to one equivalent RT bronchodilator order with every 4 hours Frequency and an Albuterol order with Frequency of every 2 hours PRN wheezing or increased work of breathing using Per Protocol order mode.     RT to enter RT Home Evaluation for COPD & MDI Assessment order using Per Protocol order mode.    Electronically signed by Johanny Lazaro RCP on 5/15/2024 at 3:50 PM

## 2024-05-15 NOTE — H&P
Transportation (Non-Medical): No   Housing Stability: Low Risk  (5/12/2024)    Housing Stability Vital Sign     Unable to Pay for Housing in the Last Year: No     Number of Places Lived in the Last Year: 1     Unstable Housing in the Last Year: No       Medications:   Medications:    morphine  2 mg IntraVENous Once    acetaminophen  650 mg Oral Once      Infusions:   PRN Meds: ondansetron, 4 mg, Q30 Min PRN        Labs      CBC:   Recent Labs     05/12/24 0826 05/13/24 0223 05/14/24  1503   WBC 7.7 6.0 7.0   HGB 8.4* 8.1* 8.9*    182 198     BMP:    Recent Labs     05/12/24 0826 05/13/24 0223 05/14/24  1503    137 138   K 5.4* 5.1 4.9    104 105   CO2 20* 22 22   BUN 46* 48* 44*   CREATININE 2.1* 2.1* 2.4*   GLUCOSE 141* 172* 204*     Hepatic:   Recent Labs     05/12/24 0826 05/13/24 0223 05/14/24  1503   AST 11* 12* 12*   ALT 9* 9* 7*   BILITOT 0.2 0.2 0.2   ALKPHOS 84 82 85     Lipids:   Lab Results   Component Value Date/Time    CHOL 171 05/30/2023 06:48 AM    HDL 43 05/30/2023 06:48 AM    TRIG 129 05/30/2023 06:48 AM     Hemoglobin A1C:   Lab Results   Component Value Date/Time    LABA1C 8.0 05/12/2024 08:26 AM     TSH: No results found for: \"TSH\"  Troponin:   Lab Results   Component Value Date/Time    TROPONINT 0.038 05/21/2023 07:14 PM    TROPONINT 0.044 10/24/2022 12:20 PM    TROPONINT 0.030 04/20/2022 07:30 AM     Lactic Acid: No results for input(s): \"LACTA\" in the last 72 hours.  BNP:   Recent Labs     05/14/24  1809   PROBNP 2,063*     UA:  Lab Results   Component Value Date/Time    NITRU POSITIVE 05/14/2024 04:45 PM    NITRU NEGATIVE 03/23/2013 02:24 PM    COLORU YELLOW 05/14/2024 04:45 PM    PHUR 7.5 05/14/2024 04:45 PM    WBCUA 88 05/11/2024 04:41 PM    RBCUA 30 05/11/2024 04:41 PM    MUCUS RARE 05/11/2024 04:41 PM    TRICHOMONAS NONE SEEN 05/11/2024 04:41 PM    YEAST RARE 05/11/2024 04:41 PM    BACTERIA MODERATE 05/11/2024 04:41 PM    CLARITYU TURBID 05/14/2024 04:45 PM     TECHNIQUE: Axial CT imaging obtained through the cervical spine. Axial images and multiplanar reformatted images reviewed.   Individualized dose optimization technique was used in order to meet ALARA standards for radiation dose reduction.  In addition to vendor specific dose reduction algorithms, the dose reduction techniques vary based on the specific scanner utilized but frequently include automated exposure control, adjustment of the mA and/or kV according to patient size, and use of iterative reconstruction technique. IV contrast: None. FINDINGS: ALIGNMENT: Cervical spine straightening, mild scoliosis. BONES: No fracture or destructive osseous process. No change. CT to C7 posterior fusion hardware. Bilateral pedicle screws and vertically oriented fixation rods. Laminectomy defects. NECK SOFT TISSUES: Atherosclerosis. Medialized bilateral carotid arteries. Paraspinal muscle atrophy.. VISUALIZED LUNG/MEDIASTINUM: Normal. Congenital nonunion posterior arch C1. Severe multilevel disc height loss. Partial osseous fusion across the intervertebral discs from C3 to C7. Multilevel neural foraminal narrowing.     1.  No change. No CT evidence for acute cervical spine fracture. 2.  No change. C3-C7 posterior fusion hardware. Electronically signed by Santiago Moore DO    CT MAXILLOFACIAL WO CONTRAST    Result Date: 5/14/2024  EXAM: CT MAXILLOFACIAL WO CONTRAST INDICATION: fall, left temple COMPARISON: CT head April 16, 2024 TECHNICAL: Noncontrast axial CT imaging obtained through the paranasal sinuses. Axial and multiplanar reformatted images reviewed.   Up-to-date CT equipment and radiation dose reduction techniques were employed. CONTRAST: NONE FINDINGS: ORBITS: Normal. INTRACRANIAL CONTENTS: No visible intracranial mass effect. PARANASAL SINUSES: Similar maxillary sinus mixed attenuation material. Near complete right, mild left maxillary sinus mucosal thickening. NASAL PASSAGES: No nasal cavity mass. Nasal septum mild

## 2024-05-15 NOTE — PROGRESS NOTES
4 Eyes Skin Assessment     NAME:  Greg Easton  YOB: 1940  MEDICAL RECORD NUMBER:  [id number]    The patient is being assessed for  Admission    I agree that at least one RN has performed a thorough Head to Toe Skin Assessment on the patient. ALL assessment sites listed below have been assessed.      Areas assessed by both nurses:    Head, Face, Ears, Shoulders, Back, Chest, Arms, Elbows, Hands, Sacrum. Buttock, Coccyx, Ischium, and Legs. Feet and Heels        Does the Patient have a Wound? No noted wound(s)       Judd Prevention initiated by RN: Yes  Wound Care Orders initiated by RN: No    Pressure Injury (Stage 3,4, Unstageable, DTI, NWPT, and Complex wounds) if present, place Wound referral order by RN under : No    New Ostomies, if present place, Ostomy referral order under : No     Nurse 1 eSignature: Electronically signed by Leanna Gallardo RN on 5/14/24 at 11:57 PM EDT    **SHARE this note so that the co-signing nurse can place an eSignature**    Nurse 2 eSignature: Electronically signed by Angela Banerjee RN on 5/15/24 at 12:01 AM EDT

## 2024-05-15 NOTE — PROGRESS NOTES
Comprehensive Nutrition Assessment    Type and Reason for Visit:  Initial, Positive Nutrition Screen (MST 3)    Nutrition Recommendations/Plan:   Liberalize to Carb Controlled 5  Please provide adequate insulin coverage as medically able   Continue Diabetic oral nutrition supplement TID, encourage between meals   Encourage proper hydration/fluids intake\  Please document all PO intakes in I/O      Malnutrition Assessment:  Malnutrition Status:  Moderate malnutrition (05/15/24 1251)    Context:  Acute Illness     Findings of the 6 clinical characteristics of malnutrition:  Energy Intake:  75% or less of estimated energy requirements for 7 or more days (3-4 months reduced)  Weight Loss:   (3% in 1 months, mild; 7% in 9 mos)     Body Fat Loss:  Mild body fat loss Triceps   Muscle Mass Loss:  Moderate muscle mass loss Calf (gastrocnemius), Thigh (quadriceps) (mild in clavicles)  Fluid Accumulation:  No significant fluid accumulation Extremities   Strength:  Not Performed    Nutrition Assessment:    Admitted with syncope and collapse. PMHx DMII on insulin, CKD, Prostate CA s/p resection and chemo, constipation, hypertension. Currently on carb controlled diet with diabetic supplements. States had not eaten anything x 1 day pta. C/o 25# wt loss x 3-4 mos, stated UBW of 206-214lb. Chart shows only 7% wt loss in 9 mos, mild. Has been hospitalized wtihin time frame of weight loss with constipation, N/V/P. Pt likely having uncontrolled BS levels with reduced appetite RT acute illness, attributing to wt loss. C/o neck line and belly reducing in size. Performed NFPE, mild to moderate wasting noted. Meets criteria for malnutrition. Will continue diabetic supplements. Follow as high nutrition risk.    Nutrition Related Findings:    POCT >200, A1c 8%, GFR 29 Wound Type: None       Current Nutrition Intake & Therapies:    Average Meal Intake:  (reduced per pt)  Average Supplements Intake: None Ordered  ADULT DIET; Regular; 4  carb choices (60 gm/meal)  ADULT ORAL NUTRITION SUPPLEMENT; AM Snack, PM Snack, HS Snack; Diabetic Oral Supplement    Anthropometric Measures:  Height: 172.7 cm (5' 8\")  Ideal Body Weight (IBW): 154 lbs (70 kg)       Current Body Weight: 81.2 kg (179 lb), 116.2 % IBW. Weight Source: Stated (reports 177lb now)  Current BMI (kg/m2): 27.2  Usual Body Weight: 87.1 kg (192 lb) (8/28/23; 185 lb 3/2024)  % Weight Change (Calculated): -6.8  Weight Adjustment For: No Adjustment                 BMI Categories: Overweight (BMI 25.0-29.9)    Estimated Daily Nutrient Needs:  Energy Requirements Based On: Formula  Weight Used for Energy Requirements: Current  Energy (kcal/day): 8034-8898 (MSJ)  Weight Used for Protein Requirements: Ideal  Protein (g/day): 70-84 (1-1.2 g/kg IBW)  Method Used for Fluid Requirements: Standard Renal  Fluid (ml/day): per nephrology    Nutrition Diagnosis:   Moderate malnutrition, In context of acute illness or injury related to acute injury/trauma, inadequate protein-energy intake as evidenced by poor intake prior to admission, mild loss of subcutaneous fat, moderate muscle loss    Nutrition Interventions:   Food and/or Nutrient Delivery: Continue Current Diet, Start Oral Nutrition Supplement  Nutrition Education/Counseling: Survival skills/brief education completed (malnutrition bag)  Coordination of Nutrition Care: Continue to monitor while inpatient, Coordination of Care  Plan of Care discussed with: pt    Goals:     Goals: PO intake 50% or greater       Nutrition Monitoring and Evaluation:   Behavioral-Environmental Outcomes: None Identified  Food/Nutrient Intake Outcomes: Food and Nutrient Intake, Supplement Intake  Physical Signs/Symptoms Outcomes: Biochemical Data, Nutrition Focused Physical Findings, Weight, Meal Time Behavior    Discharge Planning:    Too soon to determine     Joshua Stubbs RD, LD  Contact: [phone]

## 2024-05-15 NOTE — PROGRESS NOTES
V2.0  [facility] Hospitalist Progress Note      Name:  Greg Easton /Age/Sex: 1940  (83 y.o. male)   MRN & CSN:  [id number] & [id number] Encounter Date/Time: 5/15/2024 8:51 AM EDT    Location:  [facility] PCP: Jacobo Zazueta MD       Hospital Day: 2    Assessment and Plan:   Greg Easton is a 83 y.o. male with pmh of  prostate cancer, diabetes mellitus, suprapubic catheter, CHF,  who presents with Syncope and collapse      Plan:    Syncope and collapse-May be glucose related since patient had taken insulin prior to event.  Patient is also orthostatic positive.  However due to history of CHF and elevated BNP will hold off on fluids and encourage oral intake.  Check echo and carotids.  Monitor on telemetry.  Troponin 39.  EKG shows sinus bradycardia with first-degree block.  Check serial troponins.  Consult cardiology.  May also have been vasovagal related since patient had been vomiting prior to event.  CT scans show no acute fracture.  Do not appreciate any injury on exam of the face or head.  ECHO: normal LV function, EF 60-65%, mild increase in LV wall thickness  Carotid US:  Estimated diameter reduction of the right internal carotid artery is less than 15%. Estimated diameter reduction left internal carotid artery is less than 15%. Vertebral arteries bilaterally with antegrade flow.  Cardiology Consult: Dr. Fajardo, appreciate recs  Has Severe Orthostatic Hypotension: lying 151/52, sitting 142/53, standing 104/45  Placed Bilateral Thigh High Stockings and encouraged increased PO intake   Low Back Pain w/ Peripheral Neuropathy: likely Lumbar Radiculopathy: states he has had low back pain and peripheral neuropathy for years, on exam his left calf is severely atrophied, states his legs go out on him all the time, which also causes him to fall and lose balance, will check MRI Lumbar Spine  MRI Lumbar Spine: pending   Nausea and vomiting-may have been related to recent UTI.  Continue ciprofloxacin.

## 2024-05-15 NOTE — CONSULTS
catheter.    PROSTATE SURGERY      PROSTATECTOMY  1996    infection - had cancer     Current Medications:   Current Facility-Administered Medications: morphine (PF) injection 2 mg, 2 mg, IntraVENous, Once  acetaminophen (TYLENOL) tablet 650 mg, 650 mg, Oral, Q6H PRN  albuterol sulfate HFA (PROVENTIL;VENTOLIN;PROAIR) 108 (90 Base) MCG/ACT inhaler 1 puff, 1 puff, Inhalation, 4x Daily  amLODIPine (NORVASC) tablet 10 mg, 10 mg, Oral, Daily  aspirin chewable tablet 81 mg, 81 mg, Oral, Nightly  atorvastatin (LIPITOR) tablet 20 mg, 20 mg, Oral, Nightly  bisacodyl (DULCOLAX) suppository 10 mg, 10 mg, Rectal, Daily PRN  carvedilol (COREG) tablet 25 mg, 25 mg, Oral, BID  ciprofloxacin (CIPRO) tablet 250 mg, 250 mg, Oral, BID  ferrous sulfate (IRON 325) tablet 325 mg, 325 mg, Oral, Daily with breakfast  levothyroxine (SYNTHROID) tablet 75 mcg, 75 mcg, Oral, Daily  insulin lispro (HUMALOG,ADMELOG) injection vial 0-4 Units, 0-4 Units, SubCUTAneous, TID WC  insulin lispro (HUMALOG,ADMELOG) injection vial 0-4 Units, 0-4 Units, SubCUTAneous, Nightly  glucose chewable tablet 16 g, 4 tablet, Oral, PRN  dextrose bolus 10% 125 mL, 125 mL, IntraVENous, PRN **OR** dextrose bolus 10% 250 mL, 250 mL, IntraVENous, PRN  glucagon injection 1 mg, 1 mg, SubCUTAneous, PRN  dextrose 10 % infusion, , IntraVENous, Continuous PRN  pantoprazole (PROTONIX) tablet 40 mg, 40 mg, Oral, QAM AC  senna (SENOKOT) tablet 17.2 mg, 2 tablet, Oral, Daily  Vitamin D (CHOLECALCIFEROL) tablet 2,000 Units, 2,000 Units, Oral, Daily  sodium chloride flush 0.9 % injection 5-40 mL, 5-40 mL, IntraVENous, 2 times per day  sodium chloride flush 0.9 % injection 5-40 mL, 5-40 mL, IntraVENous, PRN  0.9 % sodium chloride infusion, , IntraVENous, PRN  enoxaparin Sodium (LOVENOX) injection 30 mg, 30 mg, SubCUTAneous, Daily  ondansetron (ZOFRAN-ODT) disintegrating tablet 4 mg, 4 mg, Oral, Q8H PRN **OR** ondansetron (ZOFRAN) injection 4 mg, 4 mg, IntraVENous, Q6H PRN  polyethylene      1.  Progressive mild right hydroureteronephrosis. 2.  No change. Atrophic left kidney. Left double-J ureteral stent, well positioned, no adjacent stones. 3.  Well-positioned suprapubic catheter within a completely decompressed urinary bladder. 4.  No acute fracture. Electronically signed by Santiago Moore DO    Assessment & Plan:      Greg Easton is a 83 y.o. male with pmhx of prostatectomy for cancer, SPT due to eroded AUS, chronic left hydronephrosis (managed with left ureteral stent), DM2, and CKD4 admitted 5/11/2024 for hyperkalemia.     1) Chronic Left Hydronephrosis: Managed with indwelling stent (last exchanged 5/13/24)    CT a/p 5/14/24: Progressive mild right hydroureteronephrosis. No change. Atrophic left kidney. Left double-J ureteral stent, well positioned, no adjacent stones. Well-positioned suprapubic catheter within a completely decompressed urinary bladder.              Continue left ureteral stent exchange [id number].     2) Chronic Urinary Retention: Managed with chronic SPT, last exchanged 5/12/24.              Recommend SPT exchanges q3 weeks.      3) Chronic Cystitis: Urine cx 5/11/24 with mixed pathogens, likely contaminated. Repeat urine cx pending. Abx per primary.     4) H/o Prostate Cancer: S/p RPP with Dr. Jose in 1996. PSA 0.93 2/2021. On Eligard q6 months.     Pt stable from a  standpoint. Will sign off, please call with any questions. Pt to follow up in our office with Dr. Mckeon in 3 weeks for reevaluation.    Patient seen and examined, chart reviewed.     Electronically signed by Jon Cherry PA-C on 5/15/2024 at 8:31 AM

## 2024-05-15 NOTE — PLAN OF CARE
Problem: Discharge Planning  Goal: Discharge to home or other facility with appropriate resources  5/15/2024 1049 by Estevan Galvan RN  Outcome: Progressing  5/15/2024 0007 by Leanna Gallardo RN  Outcome: Progressing     Problem: Pain  Goal: Verbalizes/displays adequate comfort level or baseline comfort level  5/15/2024 1049 by Estevan Galvan RN  Outcome: Progressing  5/15/2024 0007 by Leanna Gallardo, RN  Outcome: Progressing     Problem: Safety - Adult  Goal: Free from fall injury  5/15/2024 1049 by Estevan Galvan RN  Outcome: Progressing  5/15/2024 0007 by Leanna Gallardo, RN  Outcome: Progressing      Pt denied all psych sx at time of discharge. All belongings were returned to pt. Pt was escorted off the unit at 1655. Pt was greeted by grandmother and step grandfather. AVS explained to pt and his grandmother. Grandmother verified the information on AVS was correct and including name of pt, doctor appointments and medication. All questions were answered. Pt and grandmother verbalized understanding.

## 2024-05-15 NOTE — CONSULTS
Name:  Greg Easton /Age/Sex: 1940  (83 y.o. male)   MRN & CSN:  [id number] & [id number] Admission Date/Time: 2024  2:33 PM   Location:  [address] [place] [address] PCP: Jacobo Zazueta MD       Hospital Day: 2          Referring physician:  Shani Rizo MD         Reason for consultation: Syncope        Thanks for referral.    Information source: Chart/staff/patient    CC; passed out      HPI:   Thank you for involving me in taking  care of Greg Easton who  is a 83 y.o.year  Old male  Presents with history of severe prostate diabetes admitted with syncope and fall it is unclear whether the patient was hypoglycemic denies any premonitory symptoms                     Past medical history:    has a past medical history of Acute kidney failure (HCC), Acute urinary tract infection, Anemia, Ataxia, Ataxia, Bacteremia, Candidiasis, Chronic combined systolic and diastolic congestive heart failure (HCC), Chronic kidney disease, Cognitive communication deficit, Diabetes mellitus (HCC), Extended spectrum beta lactamase (ESBL) resistance, Fusion of spine of cervical region, Gait disturbance, History of prostate cancer, History of tobacco use, Hydronephrosis, Hyperkalemia, Hyperlipidemia, Hypertension, Hypertensive heart disease with CHF (congestive heart failure) (HCC), Hypotension, Immunodeficiency (HCC), Metabolic encephalopathy, Muscle weakness, Osteoarthritis, Osteoarthritis, Secondary Hyperaldosteronism (HCC), Supraventricular tachycardia (HCC), and Tubulo-interstitial nephritis.  Past surgical history:   has a past surgical history that includes Prostate surgery; other surgical history (2013); Colon surgery; Bladder surgery; Prostatectomy (); Bladder surgery (Left, 2022); Cystoscopy (Left, 2022); Cystoscopy (Left, 10/31/2023); and change of bladder tube,complicated (2024).  Social History:   reports that he quit smoking about 47 years ago. His smoking use included

## 2024-05-16 ENCOUNTER — APPOINTMENT (OUTPATIENT)
Dept: MRI IMAGING | Age: 84
DRG: 699 | End: 2024-05-16
Payer: MEDICARE

## 2024-05-16 LAB
ANION GAP SERPL CALCULATED.3IONS-SCNC: 10 MMOL/L (ref 7–16)
BASOPHILS ABSOLUTE: 0 K/CU MM
BASOPHILS RELATIVE PERCENT: 0.3 % (ref 0–1)
BUN SERPL-MCNC: 40 MG/DL (ref 6–23)
CALCIUM SERPL-MCNC: 7.2 MG/DL (ref 8.3–10.6)
CHLORIDE BLD-SCNC: 105 MMOL/L (ref 99–110)
CO2: 20 MMOL/L (ref 21–32)
CREAT SERPL-MCNC: 2.1 MG/DL (ref 0.9–1.3)
CULTURE: ABNORMAL
CULTURE: ABNORMAL
DIFFERENTIAL TYPE: ABNORMAL
EOSINOPHILS ABSOLUTE: 0.6 K/CU MM
EOSINOPHILS RELATIVE PERCENT: 9.4 % (ref 0–3)
GFR, ESTIMATED: 30 ML/MIN/1.73M2
GLUCOSE BLD-MCNC: 196 MG/DL (ref 70–99)
GLUCOSE BLD-MCNC: 199 MG/DL (ref 70–99)
GLUCOSE BLD-MCNC: 237 MG/DL (ref 70–99)
GLUCOSE BLD-MCNC: 273 MG/DL (ref 70–99)
GLUCOSE SERPL-MCNC: 238 MG/DL (ref 70–99)
HCT VFR BLD CALC: 25 % (ref 42–52)
HEMOGLOBIN: 7.7 GM/DL (ref 13.5–18)
IMMATURE NEUTROPHIL %: 0.9 % (ref 0–0.43)
LYMPHOCYTES ABSOLUTE: 1.8 K/CU MM
LYMPHOCYTES RELATIVE PERCENT: 27.5 % (ref 24–44)
Lab: ABNORMAL
MCH RBC QN AUTO: 30.7 PG (ref 27–31)
MCHC RBC AUTO-ENTMCNC: 30.8 % (ref 32–36)
MCV RBC AUTO: 99.6 FL (ref 78–100)
MONOCYTES ABSOLUTE: 0.6 K/CU MM
MONOCYTES RELATIVE PERCENT: 9.7 % (ref 0–4)
NEUTROPHILS ABSOLUTE: 3.4 K/CU MM
NEUTROPHILS RELATIVE PERCENT: 52.2 % (ref 36–66)
NUCLEATED RBC %: 0 %
PDW BLD-RTO: 15.5 % (ref 11.7–14.9)
PLATELET # BLD: 162 K/CU MM (ref 140–440)
PMV BLD AUTO: 11.4 FL (ref 7.5–11.1)
POTASSIUM SERPL-SCNC: 4.8 MMOL/L (ref 3.5–5.1)
RBC # BLD: 2.51 M/CU MM (ref 4.6–6.2)
SODIUM BLD-SCNC: 135 MMOL/L (ref 135–145)
SPECIMEN: ABNORMAL
TOTAL IMMATURE NEUTOROPHIL: 0.06 K/CU MM
TOTAL NUCLEATED RBC: 0 K/CU MM
TROPONIN, HIGH SENSITIVITY: 40 NG/L (ref 0–22)
TROPONIN, HIGH SENSITIVITY: 41 NG/L (ref 0–22)
TROPONIN, HIGH SENSITIVITY: 41 NG/L (ref 0–22)
TROPONIN, HIGH SENSITIVITY: 42 NG/L (ref 0–22)
WBC # BLD: 6.5 K/CU MM (ref 4–10.5)

## 2024-05-16 PROCEDURE — 6360000002 HC RX W HCPCS: Performed by: HOSPITALIST

## 2024-05-16 PROCEDURE — G0378 HOSPITAL OBSERVATION PER HR: HCPCS

## 2024-05-16 PROCEDURE — 97112 NEUROMUSCULAR REEDUCATION: CPT

## 2024-05-16 PROCEDURE — 72148 MRI LUMBAR SPINE W/O DYE: CPT

## 2024-05-16 PROCEDURE — 36415 COLL VENOUS BLD VENIPUNCTURE: CPT

## 2024-05-16 PROCEDURE — 97166 OT EVAL MOD COMPLEX 45 MIN: CPT

## 2024-05-16 PROCEDURE — 82962 GLUCOSE BLOOD TEST: CPT

## 2024-05-16 PROCEDURE — 94761 N-INVAS EAR/PLS OXIMETRY MLT: CPT

## 2024-05-16 PROCEDURE — 85025 COMPLETE CBC W/AUTO DIFF WBC: CPT

## 2024-05-16 PROCEDURE — 6360000002 HC RX W HCPCS: Performed by: NURSE PRACTITIONER

## 2024-05-16 PROCEDURE — 97535 SELF CARE MNGMENT TRAINING: CPT

## 2024-05-16 PROCEDURE — 96372 THER/PROPH/DIAG INJ SC/IM: CPT

## 2024-05-16 PROCEDURE — 6370000000 HC RX 637 (ALT 250 FOR IP): Performed by: NURSE PRACTITIONER

## 2024-05-16 PROCEDURE — 1200000000 HC SEMI PRIVATE

## 2024-05-16 PROCEDURE — 97530 THERAPEUTIC ACTIVITIES: CPT

## 2024-05-16 PROCEDURE — 6370000000 HC RX 637 (ALT 250 FOR IP): Performed by: HOSPITALIST

## 2024-05-16 PROCEDURE — 2580000003 HC RX 258: Performed by: HOSPITALIST

## 2024-05-16 PROCEDURE — 80048 BASIC METABOLIC PNL TOTAL CA: CPT

## 2024-05-16 PROCEDURE — 97162 PT EVAL MOD COMPLEX 30 MIN: CPT

## 2024-05-16 PROCEDURE — 84484 ASSAY OF TROPONIN QUANT: CPT

## 2024-05-16 PROCEDURE — 99232 SBSQ HOSP IP/OBS MODERATE 35: CPT | Performed by: INTERNAL MEDICINE

## 2024-05-16 PROCEDURE — 2580000003 HC RX 258: Performed by: NURSE PRACTITIONER

## 2024-05-16 RX ADMIN — CARVEDILOL 25 MG: 25 TABLET, FILM COATED ORAL at 21:11

## 2024-05-16 RX ADMIN — MEROPENEM 1000 MG: 1 INJECTION, POWDER, FOR SOLUTION INTRAVENOUS at 16:43

## 2024-05-16 RX ADMIN — INSULIN LISPRO 4 UNITS: 100 INJECTION, SOLUTION INTRAVENOUS; SUBCUTANEOUS at 13:31

## 2024-05-16 RX ADMIN — ENOXAPARIN SODIUM 30 MG: 100 INJECTION SUBCUTANEOUS at 08:52

## 2024-05-16 RX ADMIN — INSULIN GLARGINE 10 UNITS: 100 INJECTION, SOLUTION SUBCUTANEOUS at 21:11

## 2024-05-16 RX ADMIN — CIPROFLOXACIN HYDROCHLORIDE 250 MG: 250 TABLET, FILM COATED ORAL at 08:52

## 2024-05-16 RX ADMIN — ASPIRIN 81 MG: 81 TABLET, CHEWABLE ORAL at 21:09

## 2024-05-16 RX ADMIN — SENNOSIDES 17.2 MG: 8.6 TABLET, FILM COATED ORAL at 08:52

## 2024-05-16 RX ADMIN — FERROUS SULFATE TAB 325 MG (65 MG ELEMENTAL FE) 325 MG: 325 (65 FE) TAB at 08:52

## 2024-05-16 RX ADMIN — PANTOPRAZOLE SODIUM 40 MG: 40 TABLET, DELAYED RELEASE ORAL at 08:52

## 2024-05-16 RX ADMIN — ATORVASTATIN CALCIUM 20 MG: 10 TABLET, FILM COATED ORAL at 21:09

## 2024-05-16 RX ADMIN — LEVOTHYROXINE SODIUM 75 MCG: 0.07 TABLET ORAL at 08:52

## 2024-05-16 RX ADMIN — SODIUM CHLORIDE, PRESERVATIVE FREE 10 ML: 5 INJECTION INTRAVENOUS at 08:53

## 2024-05-16 RX ADMIN — Medication 2000 UNITS: at 08:52

## 2024-05-16 RX ADMIN — SODIUM CHLORIDE, PRESERVATIVE FREE 10 ML: 5 INJECTION INTRAVENOUS at 21:13

## 2024-05-16 ASSESSMENT — PAIN SCALES - GENERAL
PAINLEVEL_OUTOF10: 0

## 2024-05-16 NOTE — PLAN OF CARE
Problem: Pain  Goal: Verbalizes/displays adequate comfort level or baseline comfort level  5/15/2024 2054 by Chad Sarkar RN  Outcome: Progressing  5/15/2024 1049 by Estevan Galvan RN  Outcome: Progressing     Problem: Safety - Adult  Goal: Free from fall injury  5/15/2024 2054 by Chad Sarkar RN  Outcome: Progressing  5/15/2024 1049 by Estevan Galvan RN  Outcome: Progressing     Problem: Nutrition Deficit:  Goal: Optimize nutritional status  Outcome: Progressing     Problem: Discharge Planning  Goal: Discharge to home or other facility with appropriate resources  5/15/2024 2054 by Chad Sarkar RN  Outcome: Progressing  5/15/2024 1049 by Estevan Galvan RN  Outcome: Progressing

## 2024-05-16 NOTE — PROGRESS NOTES
V2.0  [facility] [place] Hospitalist Progress Note      Name:  Greg Easton /Age/Sex: 1940  (83 y.o. male)   MRN & CSN:  [id number] & [id number] Encounter Date/Time: 2024 8:51 AM EDT    [facility]:  [facility] PCP: Jacobo Zazueta MD       Hospital Day: 3    Assessment and Plan:   Greg Easton is a 83 y.o. male with pmh of  prostate cancer, diabetes mellitus, suprapubic catheter, CHF,  who presents with Syncope and collapse      Plan:    Syncope and collapse-May be glucose related since patient had taken insulin prior to event.  Patient is also orthostatic positive.  However due to history of CHF and elevated BNP will hold off on fluids and encourage oral intake.  Check echo and carotids.  Monitor on telemetry.  Troponin 39.  EKG shows sinus bradycardia with first-degree block.  Check serial troponins.  Consult cardiology.  May also have been vasovagal related since patient had been vomiting prior to event.  CT scans show no acute fracture.  Do not appreciate any injury on exam of the face or head.  ECHO: normal LV function, EF 60-65%, mild increase in LV wall thickness  Carotid US:  Estimated diameter reduction of the right internal carotid artery is less than 15%. Estimated diameter reduction left internal carotid artery is less than 15%. Vertebral arteries bilaterally with antegrade flow.  Cardiology Consult: Dr. Fajardo, checked ECHO and Troponins 43-44, will follow   Has Severe Orthostatic Hypotension: lying 151/52, sitting 142/53, standing 104/45  Placed Bilateral Thigh High Stockings and encouraged increased PO intake   PT/OT Evals: pending   Repeat Orthostatic BP: lying 158/59, siting 151/76, standing 132/47  Low Back Pain w/ Peripheral Neuropathy: likely Lumbar Radiculopathy: states he has had low back pain and peripheral neuropathy for years, on exam his left calf is severely atrophied, states his legs go out on him all the time, which also causes him to fall and lose balance, will check MRI

## 2024-05-16 NOTE — CONSULTS
[facility] ACUTE CARE OCCUPATIONAL THERAPY EVALUATION    Greg Easton, 1940, 3020/3020-A, 5/16/2024      Discharge Recommendation:  Patient has intensive post-acute occupational therapy service needs. Encourage facility for intensive rehabilitation, anticipate 3 hours per day and 5 days per week.    History:  [place]:  The primary encounter diagnosis was Syncope and collapse. 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  Past Medical History:   Diagnosis Date    Acute kidney failure (HCC)     Acute urinary tract infection 03/15/2012    Anemia     Ataxia 01/01/2010    Ataxia     Bacteremia     Candidiasis     Chronic combined systolic and diastolic congestive heart failure (HCC)     Chronic kidney disease     Cognitive communication deficit     Diabetes mellitus (HCC) 01/01/2011    type 2, controlled    Extended spectrum beta lactamase (ESBL) resistance     Fusion of spine of cervical region 10/01/2012    Gait disturbance 01/01/2011    History of prostate cancer 01/01/1996    adenocarcinoma    History of tobacco use 01/01/1959    Hydronephrosis     Hyperkalemia     Hyperlipidemia 01/01/2011    Hypertension     Hypertensive heart disease with CHF (congestive heart failure) (HCC)     Hypotension     Immunodeficiency (HCC)     Metabolic encephalopathy     Muscle weakness     Osteoarthritis 01/01/2011    Osteoarthritis     Secondary Hyperaldosteronism (HCC)     Supraventricular tachycardia (HCC)     Tubulo-interstitial nephritis          Subjective:  Patient states: \"I will do any kind of therapy to get stronger\"  Pain: c/o chronic back pain no rating   Communication with other providers: RN, PT   Restrictions: general precautions, fall risk, contact ISO   RN and  at bedside    Home Setup/Prior level of function:  Social/Functional History  Lives  Putting on and taking off regular lower body clothing? [x] 1    [] 2   [] 2   [] 3   [] 3   [] 4      2. Bathing (including washing, rinsing, drying)? [] 1   [] 2   [x] 2 [] 3 [] 3 [] 4   3. Toileting, which includes using toilet, bedpan, or urinal? [x] 1    [] 2   [] 2   [] 3   [] 3   [] 4     4. Putting on and taking off regular upper body clothing? [] 1   [] 2   [] 2   [x] 3   [] 3    [] 4      5. Taking care of personal grooming such as brushing teeth? [] 1   [] 2    [] 2 [] 3    [x] 3   [] 4      6. Eating meals?   [] 1   [] 2   [] 2   [] 3   [x] 3   [] 4        Raw Score:  13      24/24 = unimpaired  23/24 = 1-20% impaired   20/24-22/24 = 21-40% impaired  15/24-19/24 = 41-59% impaired   10/24-14/24 = 60%-79% impaired  7/24-9/24 = 80%-99% impaired  6/24 = 100% impaired     Treatment:  Therapeutic Activity Training:   Therapeutic activity training was instructed today.  Cues were given for safety, sequence, UE/LE placement, visual cues, and balance.    Activities performed today included sup to sit, scooting, seated balance, transfers, standing balance, pivots.     Self Care Training:   Self care training was performed today.  Cues were given for safety, sequence, UE/LE placement, visual cues, and balance.    Activities performed today included toileting, LB Dressing.       Education: Role of OT, OT POC, discharge needs, safety, benefits of EOB/OOB activity, AE needs    Safety Measures: Gait belt used for safety of pt and therapist, Left in recliner, Alarm in place, call light and phone within reach, lines managed, needs met     Assessment:  Pt is a 83 yr old male from home who presented with syncope and collapse. Prior to admission, pt was grossly independent. Pt currently performed bed mobility Pippa, transfers CGA, pivots CGA, too fatigued to ambulate at this time, and up to DEP assist for ADLs. Pt would benefit from continued acute care OT services during their stay to address barriers noted and prevent

## 2024-05-16 NOTE — CONSULTS
[facility] ACUTE CARE PHYSICAL THERAPY EVALUATION  Greg Easton, 1940, 3020/3020-A, 5/16/2024    History  [place]:  The primary encounter diagnosis was Syncope and collapse. 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  Patient  has a past medical history of Acute kidney failure (HCC), Acute urinary tract infection, Anemia, Ataxia, Ataxia, Bacteremia, Candidiasis, Chronic combined systolic and diastolic congestive heart failure (HCC), Chronic kidney disease, Cognitive communication deficit, Diabetes mellitus ([facility]), Extended spectrum beta lactamase (ESBL) resistance, Fusion of spine of cervical region, Gait disturbance, History of prostate cancer, History of tobacco use, Hydronephrosis, Hyperkalemia, Hyperlipidemia, Hypertension, Hypertensive heart disease with CHF (congestive heart failure) ([facility]), Hypotension, Immunodeficiency ([facility]), Metabolic encephalopathy, Muscle weakness, Osteoarthritis, Osteoarthritis, Secondary Hyperaldosteronism ([facility]), Supraventricular tachycardia ([facility]), and Tubulo-interstitial nephritis.  Patient  has a past surgical history that includes Prostate surgery; other surgical history (03/28/2013); Colon surgery; Bladder surgery; Prostatectomy (1996); Bladder surgery (Left, 03/17/2022); Cystoscopy (Left, 09/07/2022); Cystoscopy (Left, 10/31/2023); change of bladder tube,complicated (5/12/2024); and Cystoscopy (Left, 5/13/2024).    Discharge Recommendation: Encourage facility for intensive rehabilitation, anticipate 3 hours per day and 5 days per week.     Subjective:    Patient states:  \"my feet feels like sponges but after a while it goes away\"      Pain:  pt denied having any pain       Communication with other providers:  Handoff to RN, co-evaluation with OTNaila and OT studentYadi to maximize safety and for tolerance  instability/LOB during dynamic activities    Standing balance:  static: good, using FWW for support, CGA. Cues provided to stand erect and to extend BLE with fair carryover - noted difficulty fully extending BLE d/t generalized weakness. No noted instability, LOB, or knee buckling throughout    Gait: DNT, pt deferred d/t fatigue   Education: role of PT, PT POC, DC plan, safety     [facility] 6 Clicks Inpatient Mobility:  AM-PAC Inpatient Mobility Raw Score : 17    Safety: patient left in chair, chair alarm intact, call light within reach, RN notified, gait belt used.    Assessment:  Pt is an 84 yo M who presents with syncope and collapse. Prior to current admission, pt reports was primarily IND/Ayse with mobility and ADLs. Pt reports utilizing a FWW for household ambulation and 4WW for community ambulation. Pt currently requires Eitan for bed mobility; and grossly CGA for STS and functional transfers. Pt demonstrates a significant decline with his overall functional mobility, reduced strength, impaired balance, impaired gait, and reduced activity tolerance. Pt will continue to benefit from acute PT services with discharge to facility for intensive post acute rehab to address above noted deficits.     Complexity: moderate     Prognosis: Good, no significant barriers to participation at this time.   General Plan: 3-5 times per week       Equipment: TBD at next level of care. If patient decides to discharge home, no DME is required as pt has appropriate DME for home     Goals:  Short Term Goals  Time Frame for Short Term Goals: 1 week or until discharge  Short Term Goal 1: Pt will be able to perform all aspects of bed mobility with Ayse  Short Term Goal 2: Pt will be able to perform STS using FWW with Ayse  Short Term Goal 3: Pt will be able to perform functional transfer using FWW with Ayse  Short Term Goal 4: Pt will be able to ambulate 150ft+ using FWW with Ayse       Treatment plan:  Bed mobility, transfers, balance,

## 2024-05-16 NOTE — PROGRESS NOTES
Cardiology note     Cardiology consult note for : syncope    Assessment: c/o pain in groin area-denies dizziness or lightlessness     Chest: clear   Cardio:RRR  Extremities: no edema   : mccrary noted    Telemetry: sinus rhythm      Plan:    Syncope: carotid US no significant disease:   Echo done : normal EF 60-65%: no significant VHD    + orthostatic - increase po fluids / compression socks -  stop amlodipine     Echocardiogram 05/15/2024    Left Ventricle: Normal left ventricular systolic function with a visually estimated EF of 60 - 65%.  Mildly increased wall thickness.    No significant valvular disease noted.    Pericardium: No pericardial effusion.    I have seen ,spoken to  and examined this patient personally, independently of the NENA. I have reviewed the hospital care given to date and reviewed all pertinent labs and imaging.I have spoken with patient, nursing staff and provided written and verbal instructions .The above note has been reviewed     CARDIOLOGY ATTENDING ADDENDUM    HPI:  I have reviewed the above HPI  And agree with above     Pulse Range: Pulse  Av.5  Min: 59  Max: 68    Blood Presuure Range:  Systolic (24hrs), Av , Min:123 , Max:155   ; Diastolic (24hrs), Av, Min:39, Max:55      Pulse ox Range: SpO2  Av.2 %  Min: 98 %  Max: 100 %    24hr I & O:    Intake/Output Summary (Last 24 hours) at 2024 1449  Last data filed at 2024 0648  Gross per 24 hour   Intake --   Output 1200 ml   Net -1200 ml         BP (!) 148/49   Pulse 61   Temp 97.4 °F (36.3 °C) (Oral)   Resp 16   Ht 1.727 m (5' 8\")   Wt 81.2 kg (179 lb)   SpO2 99%   BMI 27.22 kg/m²       Physical Exam:  General:  Awake, alert, NAD  Head:normal  Eye:normal  Neck:  No JVD   Chest:  Clear to auscultation, respiration easy  Cardiovascular:  RRR S1S2  Abdomen:   nontender  Extremities:  tr edema  Pulses; palpable  Neuro: grossly normal      MEDICAL DECISION MAKING;    Pt assessed , chart reviewed, patient

## 2024-05-16 NOTE — CARE COORDINATION
Attempted to speak with pt re; discharge planning and ARU. Recent admission and known to this CM. Pt unavailable with pt care. CM to follow.

## 2024-05-16 NOTE — PROGRESS NOTES
Pharmacy Note - Renal Dosing    Meropenem 1g Q8h for treatment of Urinary tract infection. Per [facility] Renal Dose Adjustment Policy, meropenem will be changed to   500mg Q12h extended infusion    Estimated Creatinine Clearance: Estimated Creatinine Clearance: 25 mL/min (A) (based on SCr of 2.1 mg/dL (H)).    BMI: Body mass index is 27.22 kg/m².    Please call with any questions.    Thank you,    Yane Watkins, [facility]

## 2024-05-16 NOTE — PROGRESS NOTES
Physician Progress Note      PATIENT:               ANA ROMAN  [facility] #:                  [id number]  :                       1940  ADMIT DATE:       2024 2:33 PM  DISCH DATE:  RESPONDING  PROVIDER #:        ELKIN Dawson CNP          QUERY TEXT:    Patient admitted with syncope. Noted to have documentation of dietician   assessment with malnutrition diagnosis in 5/15/24. If possible, please   document in progress notes and discharge summary if you are evaluating and /or   treating any of the following:    The medical record reflects the following:  Risk Factors: Acute on chronic illness 83 years old DM2 CKD 4  Clinical Indicators: Dietitian consult \"Malnutrition Status:  Moderate   malnutrition (05/15/24 1251)  Context:  Acute Illness  Findings of the 6 clinical characteristics of   malnutrition:  Energy Intake:  75% or less of estimated energy requirements for 7 or more   days (3-4 months reduced)  Weight Loss:   (3% in 1 months, mild; 7% in 9 mos)  Body Fat Loss:  Mild body   fat loss Triceps  Muscle Mass Loss:  Moderate muscle mass loss\"  Treatment: Dietitian consult and dietary supplements TID    ASPEN Criteria:    https://aspenjournals.onlinelibrary.russo.com/doi/full/10.1177/267882181591253  5  Options provided:  -- Protein calorie malnutrition moderate  -- Other - I will add my own diagnosis  -- Disagree - Not applicable / Not valid  -- Disagree - Clinically unable to determine / Unknown  -- Refer to Clinical Documentation Reviewer    PROVIDER RESPONSE TEXT:    This patient has moderate protein calorie malnutrition.    Query created by: Simona Alexander on 2024 1:50 PM      Electronically signed by:  ELKIN Dawson CNP 2024 2:18 PM

## 2024-05-17 PROBLEM — E11.22 CKD STAGE 4 DUE TO TYPE 2 DIABETES MELLITUS (HCC): Status: ACTIVE | Noted: 2022-03-27

## 2024-05-17 LAB
ANION GAP SERPL CALCULATED.3IONS-SCNC: 9 MMOL/L (ref 7–16)
BASOPHILS ABSOLUTE: 0 K/CU MM
BASOPHILS RELATIVE PERCENT: 0.2 % (ref 0–1)
BUN SERPL-MCNC: 43 MG/DL (ref 6–23)
CALCIUM SERPL-MCNC: 8 MG/DL (ref 8.3–10.6)
CHLORIDE BLD-SCNC: 106 MMOL/L (ref 99–110)
CO2: 23 MMOL/L (ref 21–32)
CREAT SERPL-MCNC: 2.2 MG/DL (ref 0.9–1.3)
CULTURE: NORMAL
CULTURE: NORMAL
DIFFERENTIAL TYPE: ABNORMAL
EOSINOPHILS ABSOLUTE: 0.5 K/CU MM
EOSINOPHILS RELATIVE PERCENT: 8.1 % (ref 0–3)
GFR, ESTIMATED: 29 ML/MIN/1.73M2
GLUCOSE BLD-MCNC: 162 MG/DL (ref 70–99)
GLUCOSE BLD-MCNC: 167 MG/DL (ref 70–99)
GLUCOSE BLD-MCNC: 176 MG/DL (ref 70–99)
GLUCOSE BLD-MCNC: 222 MG/DL (ref 70–99)
GLUCOSE SERPL-MCNC: 249 MG/DL (ref 70–99)
HCT VFR BLD CALC: 24.9 % (ref 42–52)
HEMOGLOBIN: 7.6 GM/DL (ref 13.5–18)
IMMATURE NEUTROPHIL %: 0.5 % (ref 0–0.43)
LYMPHOCYTES ABSOLUTE: 1.5 K/CU MM
LYMPHOCYTES RELATIVE PERCENT: 22.6 % (ref 24–44)
Lab: NORMAL
Lab: NORMAL
MCH RBC QN AUTO: 30.2 PG (ref 27–31)
MCHC RBC AUTO-ENTMCNC: 30.5 % (ref 32–36)
MCV RBC AUTO: 98.8 FL (ref 78–100)
MONOCYTES ABSOLUTE: 0.7 K/CU MM
MONOCYTES RELATIVE PERCENT: 11.2 % (ref 0–4)
NEUTROPHILS ABSOLUTE: 3.8 K/CU MM
NEUTROPHILS RELATIVE PERCENT: 57.4 % (ref 36–66)
NUCLEATED RBC %: 0 %
PDW BLD-RTO: 15.5 % (ref 11.7–14.9)
PLATELET # BLD: 170 K/CU MM (ref 140–440)
PMV BLD AUTO: 11.9 FL (ref 7.5–11.1)
POTASSIUM SERPL-SCNC: 5 MMOL/L (ref 3.5–5.1)
RBC # BLD: 2.52 M/CU MM (ref 4.6–6.2)
SODIUM BLD-SCNC: 138 MMOL/L (ref 135–145)
SPECIMEN: NORMAL
SPECIMEN: NORMAL
TOTAL IMMATURE NEUTOROPHIL: 0.03 K/CU MM
TOTAL NUCLEATED RBC: 0 K/CU MM
TROPONIN, HIGH SENSITIVITY: 40 NG/L (ref 0–22)
WBC # BLD: 6.6 K/CU MM (ref 4–10.5)

## 2024-05-17 PROCEDURE — APPNB15 APP NON BILLABLE TIME 0-15 MINS: Performed by: NURSE PRACTITIONER

## 2024-05-17 PROCEDURE — 2580000003 HC RX 258: Performed by: NURSE PRACTITIONER

## 2024-05-17 PROCEDURE — 99223 1ST HOSP IP/OBS HIGH 75: CPT | Performed by: INTERNAL MEDICINE

## 2024-05-17 PROCEDURE — 82962 GLUCOSE BLOOD TEST: CPT

## 2024-05-17 PROCEDURE — 85025 COMPLETE CBC W/AUTO DIFF WBC: CPT

## 2024-05-17 PROCEDURE — 80048 BASIC METABOLIC PNL TOTAL CA: CPT

## 2024-05-17 PROCEDURE — 94761 N-INVAS EAR/PLS OXIMETRY MLT: CPT

## 2024-05-17 PROCEDURE — 97116 GAIT TRAINING THERAPY: CPT

## 2024-05-17 PROCEDURE — 6370000000 HC RX 637 (ALT 250 FOR IP): Performed by: NURSE PRACTITIONER

## 2024-05-17 PROCEDURE — 6360000002 HC RX W HCPCS: Performed by: NURSE PRACTITIONER

## 2024-05-17 PROCEDURE — 6370000000 HC RX 637 (ALT 250 FOR IP): Performed by: HOSPITALIST

## 2024-05-17 PROCEDURE — 84484 ASSAY OF TROPONIN QUANT: CPT

## 2024-05-17 PROCEDURE — 1200000000 HC SEMI PRIVATE

## 2024-05-17 PROCEDURE — 99232 SBSQ HOSP IP/OBS MODERATE 35: CPT | Performed by: INTERNAL MEDICINE

## 2024-05-17 PROCEDURE — 97530 THERAPEUTIC ACTIVITIES: CPT

## 2024-05-17 PROCEDURE — 6360000002 HC RX W HCPCS: Performed by: HOSPITALIST

## 2024-05-17 PROCEDURE — 36415 COLL VENOUS BLD VENIPUNCTURE: CPT

## 2024-05-17 PROCEDURE — 2580000003 HC RX 258: Performed by: HOSPITALIST

## 2024-05-17 RX ADMIN — ATORVASTATIN CALCIUM 20 MG: 10 TABLET, FILM COATED ORAL at 20:28

## 2024-05-17 RX ADMIN — MEROPENEM 500 MG: 500 INJECTION, POWDER, FOR SOLUTION INTRAVENOUS at 06:56

## 2024-05-17 RX ADMIN — MEROPENEM 500 MG: 500 INJECTION, POWDER, FOR SOLUTION INTRAVENOUS at 17:25

## 2024-05-17 RX ADMIN — CARVEDILOL 25 MG: 25 TABLET, FILM COATED ORAL at 20:28

## 2024-05-17 RX ADMIN — INSULIN LISPRO 5 UNITS: 100 INJECTION, SOLUTION INTRAVENOUS; SUBCUTANEOUS at 08:45

## 2024-05-17 RX ADMIN — INSULIN LISPRO 5 UNITS: 100 INJECTION, SOLUTION INTRAVENOUS; SUBCUTANEOUS at 13:01

## 2024-05-17 RX ADMIN — FERROUS SULFATE TAB 325 MG (65 MG ELEMENTAL FE) 325 MG: 325 (65 FE) TAB at 08:43

## 2024-05-17 RX ADMIN — CARVEDILOL 25 MG: 25 TABLET, FILM COATED ORAL at 08:44

## 2024-05-17 RX ADMIN — INSULIN LISPRO 5 UNITS: 100 INJECTION, SOLUTION INTRAVENOUS; SUBCUTANEOUS at 17:25

## 2024-05-17 RX ADMIN — INSULIN GLARGINE 10 UNITS: 100 INJECTION, SOLUTION SUBCUTANEOUS at 20:28

## 2024-05-17 RX ADMIN — SODIUM CHLORIDE, PRESERVATIVE FREE 10 ML: 5 INJECTION INTRAVENOUS at 20:29

## 2024-05-17 RX ADMIN — ENOXAPARIN SODIUM 30 MG: 100 INJECTION SUBCUTANEOUS at 08:44

## 2024-05-17 RX ADMIN — LEVOTHYROXINE SODIUM 75 MCG: 0.07 TABLET ORAL at 08:44

## 2024-05-17 RX ADMIN — Medication 2000 UNITS: at 08:44

## 2024-05-17 RX ADMIN — ASPIRIN 81 MG: 81 TABLET, CHEWABLE ORAL at 20:28

## 2024-05-17 RX ADMIN — INSULIN LISPRO 2 UNITS: 100 INJECTION, SOLUTION INTRAVENOUS; SUBCUTANEOUS at 08:46

## 2024-05-17 RX ADMIN — PANTOPRAZOLE SODIUM 40 MG: 40 TABLET, DELAYED RELEASE ORAL at 06:56

## 2024-05-17 RX ADMIN — SODIUM CHLORIDE, PRESERVATIVE FREE 10 ML: 5 INJECTION INTRAVENOUS at 08:45

## 2024-05-17 ASSESSMENT — PAIN DESCRIPTION - ONSET: ONSET: GRADUAL

## 2024-05-17 ASSESSMENT — PAIN DESCRIPTION - ORIENTATION: ORIENTATION: MID

## 2024-05-17 ASSESSMENT — PAIN DESCRIPTION - LOCATION: LOCATION: OTHER (COMMENT)

## 2024-05-17 ASSESSMENT — PAIN DESCRIPTION - DESCRIPTORS: DESCRIPTORS: SPASM

## 2024-05-17 ASSESSMENT — PAIN SCALES - GENERAL
PAINLEVEL_OUTOF10: 0
PAINLEVEL_OUTOF10: 3
PAINLEVEL_OUTOF10: 0

## 2024-05-17 ASSESSMENT — PAIN DESCRIPTION - PAIN TYPE: TYPE: CHRONIC PAIN

## 2024-05-17 ASSESSMENT — PAIN - FUNCTIONAL ASSESSMENT: PAIN_FUNCTIONAL_ASSESSMENT: ACTIVITIES ARE NOT PREVENTED

## 2024-05-17 ASSESSMENT — PAIN DESCRIPTION - FREQUENCY: FREQUENCY: INTERMITTENT

## 2024-05-17 NOTE — PROGRESS NOTES
Collection Time: 05/16/24  8:20 PM   Result Value Ref Range    Troponin, High Sensitivity 41 (H) 0 - 22 ng/L   POCT Glucose    Collection Time: 05/16/24  9:11 PM   Result Value Ref Range    POC Glucose 237 (H) 70 - 99 MG/DL   Troponin    Collection Time: 05/17/24  2:23 AM   Result Value Ref Range    Troponin, High Sensitivity 40 (H) 0 - 22 ng/L   Basic Metabolic Panel w/ Reflex to MG    Collection Time: 05/17/24  2:23 AM   Result Value Ref Range    Sodium 138 135 - 145 MMOL/L    Potassium 5.0 3.5 - 5.1 MMOL/L    Chloride 106 99 - 110 mMol/L    CO2 23 21 - 32 MMOL/L    Anion Gap 9 7 - 16    Glucose 249 (H) 70 - 99 MG/DL    BUN 43 (H) 6 - 23 MG/DL    Creatinine 2.2 (H) 0.9 - 1.3 MG/DL    Est, Glom Filt Rate 29 (L) >60 mL/min/1.73m2    Calcium 8.0 (L) 8.3 - 10.6 MG/DL   CBC with Auto Differential    Collection Time: 05/17/24  2:23 AM   Result Value Ref Range    WBC 6.6 4.0 - 10.5 K/CU MM    RBC 2.52 (L) 4.6 - 6.2 M/CU MM    Hemoglobin 7.6 (L) 13.5 - 18.0 GM/DL    Hematocrit 24.9 (L) 42 - 52 %    MCV 98.8 78 - 100 FL    MCH 30.2 27 - 31 PG    MCHC 30.5 (L) 32.0 - 36.0 %    RDW 15.5 (H) 11.7 - 14.9 %    Platelets 170 140 - 440 K/CU MM    MPV 11.9 (H) 7.5 - 11.1 FL    Differential Type AUTOMATED DIFFERENTIAL     Neutrophils % 57.4 36 - 66 %    Lymphocytes % 22.6 (L) 24 - 44 %    Monocytes % 11.2 (H) 0 - 4 %    Eosinophils % 8.1 (H) 0 - 3 %    Basophils % 0.2 0 - 1 %    Neutrophils Absolute 3.8 K/CU MM    Lymphocytes Absolute 1.5 K/CU MM    Monocytes Absolute 0.7 K/CU MM    Eosinophils Absolute 0.5 K/CU MM    Basophils Absolute 0.0 K/CU MM    Nucleated RBC % 0.0 %    Total Nucleated RBC 0.0 K/CU MM    Total Immature Neutrophil 0.03 K/CU MM    Immature Neutrophil % 0.5 (H) 0 - 0.43 %        Imaging/Diagnostics Last 24 Hours   MRI LUMBAR SPINE WO CONTRAST    Result Date: 5/16/2024  Exam:MRI LUMBAR SPINE WO CONTRAST Date:5/16/2024 10:50 EDT Indication: Low back pain, peripheral neuropathy, frequent falls Comparison: 5/14/2024

## 2024-05-17 NOTE — CARE COORDINATION
Received potential referral for ARU.  Will review patients clinicals and PT/OT notes.  Thank you for the referral.

## 2024-05-17 NOTE — PROGRESS NOTES
Cardiology note     Cardiology consult note for : syncope    Sign off will be available if needed     Assessment: feeling better today - no dizziness or lightheadedness      Chest: clear   Cardio:RRR  Extremities: no edema   : mccrary noted    Telemetry: sinus rhythm      Plan:    Syncope: carotid US no significant disease:   Echo done : normal EF 60-65%: no significant VHD    + orthostatic - increase po fluids / compression socks -  stop amlodipine     Echocardiogram 05/15/2024    Left Ventricle: Normal left ventricular systolic function with a visually estimated EF of 60 - 65%.  Mildly increased wall thickness.    No significant valvular disease noted.    Pericardium: No pericardial effusion.      I have seen ,spoken to  and examined this patient personally, independently of the NENA. I have reviewed the hospital care given to date and reviewed all pertinent labs and imaging.I have spoken with patient, nursing staff and provided written and verbal instructions .The above note has been reviewed     CARDIOLOGY ATTENDING ADDENDUM    HPI:  I have reviewed the above HPI  And agree with above     Pulse Range: Pulse  Av.8  Min: 65  Max: 68    Blood Presuure Range:  Systolic (24hrs), Av , Min:159 , Max:186   ; Diastolic (24hrs), Av, Min:54, Max:93      Pulse ox Range: SpO2  Av %  Min: 98 %  Max: 100 %    24hr I & O:    Intake/Output Summary (Last 24 hours) at 2024 1546  Last data filed at 2024 1255  Gross per 24 hour   Intake 120 ml   Output 1101 ml   Net -981 ml         BP (!) 167/54   Pulse 65   Temp 98.2 °F (36.8 °C) (Oral)   Resp 13   Ht 1.727 m (5' 8\")   Wt 81.2 kg (179 lb)   SpO2 99%   BMI 27.22 kg/m²       Physical Exam:  General:  Awake, alert, NAD  Head:normal  Eye:normal  Neck:  No JVD   Chest:  Clear to auscultation, respiration easy  Cardiovascular:  RRR S1S2  Abdomen:   nontender  Extremities:  tr edema  Pulses; palpable  Neuro: grossly normal      MEDICAL DECISION

## 2024-05-17 NOTE — PROGRESS NOTES
Physical Therapy    Physical Therapy Treatment Note  Name: Greg Easton MRN: [id number] :   1940   Date:  2024   Admission Date: 2024 Room:  [address] [place], [place] [address]A   Restrictions/Precautions:          fall risk, gen prec  Communication with other providers:  nurse maren claros  Subjective:  Patient states:  agreeable  Pain:   Location, Type, Intensity (0/10 to 10/10):  does not rate    Objective:    Observation:  in chair  Objective Measures:  HR 63  Treatment, including education/measures:  STS modAx1 x multiple sets  SPT /c FWW Eitan x1 and vc for safe techs   Gait training using FWW x~10', 10' /c Eitan/CGA, slow héctor and decreased wt shift. Vc for safe techs and rest breaks between sets  Stand balance training during toilet/pericare x~3' F- grade. Vc for TYSHAWN.  Safety  Patient left safely in the chair, with call light/phone in reach with alarm applied. Gait belt was used for transfers and gait.        Assessment / Impression:   Patient's tolerance of treatment:  good   Adverse Reaction: na  Significant change in status and impact:  na  Barriers to improvement:  weakness and endurance  Plan for Next Session:    Cont gait progression  Time in:  1342  Time out:  1406  Timed treatment minutes:  24  Total treatment time:  24    Previously filed items:  Social/Functional History  Lives With: Son  Type of Home: House  Home Layout: One level  Home Access: Ramped entrance  Bathroom Shower/Tub: Walk-in shower  Bathroom Toilet: Handicap height  Home Equipment: Rollator, Walker - Rolling  Receives Help From: Family  ADL Assistance: Independent  Homemaking Assistance: Independent (shared with son)  Ambulation Assistance: Independent (Ayse with RW for short distances; rollator for long distances)  Transfer Assistance: Independent  Active : No  Patient's  Info: son        Short Term Goals  Time Frame for Short Term Goals: 1 week or until discharge  Short Term Goal 1: Pt will be able to perform all aspects of bed  mobility with Ayse  Short Term Goal 2: Pt will be able to perform STS using FWW with Ayse  Short Term Goal 3: Pt will be able to perform functional transfer using FWW with Ayse  Short Term Goal 4: Pt will be able to ambulate 150ft+ using FWW with Ayse    Electronically signed by:    Be Crook, DION  5/17/2024, 1:55 PM

## 2024-05-17 NOTE — PROGRESS NOTES
Comprehensive Nutrition Assessment    Type and Reason for Visit:  Initial, Positive Nutrition Screen (MST 3)    Nutrition Recommendations/Plan:   Continue current diet and oral nutrition supplement, between meals     Malnutrition Assessment:  Malnutrition Status:  Moderate malnutrition (05/15/24 1251)    Context:  Acute Illness     Findings of the 6 clinical characteristics of malnutrition:  Energy Intake:  75% or less of estimated energy requirements for 7 or more days (3-4 months reduced)  Weight Loss:   (3% in 1 months, mild; 7% in 9 mos)     Body Fat Loss:  Mild body fat loss Triceps   Muscle Mass Loss:  Moderate muscle mass loss Calf (gastrocnemius), Thigh (quadriceps) (mild in clavicles)  Fluid Accumulation:  No significant fluid accumulation Extremities   Strength:  Not Performed    Nutrition Assessment:    Pt continues feeding self, po intake n/a, not recorded dos. Pt is sleeping during my visit, oral supplement on bedside tray. Plan for ARU noted. Will continue to offer double portions as needed and oral supplement. Follow as high nutrition risk.    Nutrition Related Findings:    Bun 43, Cr 2.2, GFR 29, Glu 162-249   Wound Type: None       Current Nutrition Intake & Therapies:    Average Meal Intake: Unable to assess  Average Supplements Intake: Unable to assess  ADULT DIET; Regular; 5 carb choices (75 gm/meal); Double Protein Portions  ADULT ORAL NUTRITION SUPPLEMENT; Breakfast, Lunch, Dinner; Diabetic Oral Supplement    Anthropometric Measures:  Height: 172.7 cm (5' 8\")  Ideal Body Weight (IBW): 154 lbs (70 kg)    Admission Body Weight: 81.2 kg (179 lb 0.2 oz)  Current Body Weight: 81.2 kg (179 lb 0.2 oz), 116.2 % IBW. Weight Source: Stated (reports 177lb now)  Current BMI (kg/m2): 27.2  Usual Body Weight: 87.1 kg (192 lb) (8/28/23; 185 lb 3/2024)  % Weight Change (Calculated): -6.8  Weight Adjustment For: No Adjustment                 BMI Categories: Overweight (BMI 25.0-29.9)    Estimated Daily

## 2024-05-17 NOTE — CARE COORDINATION
LSW spoke with pt and his son regarding discharge plans. Pt lives with his son  in a home.  Pt has a walker at home.  Pt denies having HC.  LSW spoke with pt regarding PT/OT recs for ARU.  Pt and son agreeable.  Lizet with ARU following.

## 2024-05-17 NOTE — PROGRESS NOTES
05/17/24 1148   Encounter Summary   Encounter Overview/Reason Initial Encounter   Service Provided For Patient   Referral/Consult From Rounding   Support System Family members   Last Encounter  05/17/24  (PT calm and coping, continue to provide support as needed.)   Complexity of Encounter Low   Begin Time 1142   End Time  1149   Total Time Calculated 7 min   Assessment/Intervention/Outcome   Assessment Unable to assess   Plan and Referrals   Plan/Referrals Continue Support (comment)

## 2024-05-17 NOTE — CONSULTS
Infectious Disease Consult Note  2024   Patient Name: Greg Easton : 1940     Assessment  Recurrent ESBL E. coli suprapubic cystostomy catheter associated urinary tract infection.  Right hydronephrosis  Prostate cancer status post suprapubic cystostomy  Admitted for syncope and fall.  Has had recurrent episodes of ESBL E. coli.  Recently discharged on 2024 after a ureteral stent exchange on oral ciprofloxacin.  Urine culture at the time was reported as showing multiple colonies and was not worked up.  ESBL E. coli is resistant to ciprofloxacin.  I agree with continuation of meropenem.  Comorbid conditions: chronic combined systolic and diastolic heart failure, chronic cigarette smoking, CKD stage IV, type 2 diabetes mellitus, essential hypertension, dyslipidemia    Plan  Therapeutic:  Continue intravenous meropenem (-), and to treat for 14 days.  May discharge home on IV ertapenem  Diagnostic:  F/u:  Other:     Thank you for allowing me to consult in the care of this patient.  ------------------------  REASON FOR CONSULT: \"E Coli UTI- ESBL and MDRO\"  Requested by: Melanie Hung, VIVIANA - CNP  HPI:Patient is a 83 y.o. male with chronic combined systolic and diastolic heart failure, chronic cigarette smoking, CKD stage IV, type 2 diabetes mellitus, essential hypertension, dyslipidemia,  prostate cancer, and suprapubic catheter who was admitted 2024 for further evaluation and management of syncope and collapse.  He reports that he had fatigue earlier in the day.  He self-administered insulin, had breakfast and gave himself additional insulin 2 hours later.  45 minutes later he passed out as he was walking down the hallway.  He continued to vomit until he resolved.  EMS was activated.  At the time was found his blood glucose reading was in the 200s.  Clinical diagnosis of syncope, collapse, nausea and vomiting was made.  He had recently been discharged from the hospital on 2024 after

## 2024-05-17 NOTE — PLAN OF CARE
Problem: Discharge Planning  Goal: Discharge to home or other facility with appropriate resources  5/17/2024 0008 by Taty Hansen RN  Outcome: Progressing  5/16/2024 1311 by Janell High, RN  Outcome: Progressing     Problem: Safety - Adult  Goal: Free from fall injury  5/17/2024 0008 by Taty Hansen, RN  Outcome: Progressing  5/16/2024 1311 by Janell High, RN  Outcome: Progressing

## 2024-05-17 NOTE — CARE COORDINATION
Patient meets criteria and is approved to come to ARU.   Patient able to admit once medically stable and after ARU Medical Director and  sign the pre-admission screen (PAS), pending bed availability.

## 2024-05-18 ENCOUNTER — HOSPITAL ENCOUNTER (INPATIENT)
Age: 84
DRG: 948 | End: 2024-05-18
Attending: PHYSICAL MEDICINE & REHABILITATION | Admitting: PHYSICAL MEDICINE & REHABILITATION
Payer: MEDICARE

## 2024-05-18 VITALS
RESPIRATION RATE: 18 BRPM | HEIGHT: 68 IN | TEMPERATURE: 98.7 F | OXYGEN SATURATION: 100 % | BODY MASS INDEX: 27.13 KG/M2 | DIASTOLIC BLOOD PRESSURE: 52 MMHG | HEART RATE: 60 BPM | SYSTOLIC BLOOD PRESSURE: 157 MMHG | WEIGHT: 179 LBS

## 2024-05-18 PROBLEM — R53.81 DEBILITY: Status: ACTIVE | Noted: 2024-05-18

## 2024-05-18 LAB
GLUCOSE BLD-MCNC: 206 MG/DL (ref 70–99)
GLUCOSE BLD-MCNC: 213 MG/DL (ref 70–99)
GLUCOSE BLD-MCNC: 309 MG/DL (ref 70–99)
GLUCOSE BLD-MCNC: 340 MG/DL (ref 70–99)
GLUCOSE BLD-MCNC: 68 MG/DL (ref 70–99)
GLUCOSE BLD-MCNC: 84 MG/DL (ref 70–99)

## 2024-05-18 PROCEDURE — 6370000000 HC RX 637 (ALT 250 FOR IP): Performed by: HOSPITALIST

## 2024-05-18 PROCEDURE — 1280000000 HC REHAB R&B

## 2024-05-18 PROCEDURE — 94761 N-INVAS EAR/PLS OXIMETRY MLT: CPT

## 2024-05-18 PROCEDURE — 6360000002 HC RX W HCPCS: Performed by: HOSPITALIST

## 2024-05-18 PROCEDURE — 6370000000 HC RX 637 (ALT 250 FOR IP): Performed by: PHYSICIAN ASSISTANT

## 2024-05-18 PROCEDURE — 6360000002 HC RX W HCPCS: Performed by: NURSE PRACTITIONER

## 2024-05-18 PROCEDURE — 97530 THERAPEUTIC ACTIVITIES: CPT

## 2024-05-18 PROCEDURE — 2580000003 HC RX 258: Performed by: HOSPITALIST

## 2024-05-18 PROCEDURE — 85025 COMPLETE CBC W/AUTO DIFF WBC: CPT

## 2024-05-18 PROCEDURE — 82962 GLUCOSE BLOOD TEST: CPT

## 2024-05-18 PROCEDURE — 2580000003 HC RX 258: Performed by: NURSE PRACTITIONER

## 2024-05-18 PROCEDURE — 6370000000 HC RX 637 (ALT 250 FOR IP): Performed by: NURSE PRACTITIONER

## 2024-05-18 PROCEDURE — 97535 SELF CARE MNGMENT TRAINING: CPT

## 2024-05-18 PROCEDURE — 97116 GAIT TRAINING THERAPY: CPT

## 2024-05-18 RX ORDER — INSULIN LISPRO 100 [IU]/ML
0-4 INJECTION, SOLUTION INTRAVENOUS; SUBCUTANEOUS
Status: DISCONTINUED | OUTPATIENT
Start: 2024-05-18 | End: 2024-05-30

## 2024-05-18 RX ORDER — AMLODIPINE BESYLATE 5 MG/1
5 TABLET ORAL DAILY
Status: CANCELLED | OUTPATIENT
Start: 2024-05-19

## 2024-05-18 RX ORDER — ACETAMINOPHEN 325 MG/1
650 TABLET ORAL EVERY 6 HOURS PRN
Status: DISCONTINUED | OUTPATIENT
Start: 2024-05-18 | End: 2024-05-30 | Stop reason: HOSPADM

## 2024-05-18 RX ORDER — POLYETHYLENE GLYCOL 3350 17 G/17G
17 POWDER, FOR SOLUTION ORAL DAILY PRN
Status: DISCONTINUED | OUTPATIENT
Start: 2024-05-18 | End: 2024-05-30 | Stop reason: HOSPADM

## 2024-05-18 RX ORDER — DEXTROSE MONOHYDRATE 100 MG/ML
INJECTION, SOLUTION INTRAVENOUS CONTINUOUS PRN
Status: DISCONTINUED | OUTPATIENT
Start: 2024-05-18 | End: 2024-05-30 | Stop reason: HOSPADM

## 2024-05-18 RX ORDER — INSULIN LISPRO 100 [IU]/ML
5 INJECTION, SOLUTION INTRAVENOUS; SUBCUTANEOUS
Status: DISCONTINUED | OUTPATIENT
Start: 2024-05-18 | End: 2024-05-19

## 2024-05-18 RX ORDER — ATORVASTATIN CALCIUM 10 MG/1
20 TABLET, FILM COATED ORAL NIGHTLY
Status: CANCELLED | OUTPATIENT
Start: 2024-05-18

## 2024-05-18 RX ORDER — INSULIN LISPRO 100 [IU]/ML
0-4 INJECTION, SOLUTION INTRAVENOUS; SUBCUTANEOUS NIGHTLY
Status: DISCONTINUED | OUTPATIENT
Start: 2024-05-18 | End: 2024-05-18 | Stop reason: HOSPADM

## 2024-05-18 RX ORDER — INSULIN LISPRO 100 [IU]/ML
0-4 INJECTION, SOLUTION INTRAVENOUS; SUBCUTANEOUS NIGHTLY
Status: DISCONTINUED | OUTPATIENT
Start: 2024-05-18 | End: 2024-05-19

## 2024-05-18 RX ORDER — FERROUS SULFATE 325(65) MG
325 TABLET ORAL
Status: CANCELLED | OUTPATIENT
Start: 2024-05-19

## 2024-05-18 RX ORDER — ONDANSETRON 2 MG/ML
4 INJECTION INTRAMUSCULAR; INTRAVENOUS EVERY 6 HOURS PRN
Status: DISCONTINUED | OUTPATIENT
Start: 2024-05-18 | End: 2024-05-30 | Stop reason: HOSPADM

## 2024-05-18 RX ORDER — ALBUTEROL SULFATE 90 UG/1
1 AEROSOL, METERED RESPIRATORY (INHALATION) EVERY 4 HOURS PRN
Status: CANCELLED | OUTPATIENT
Start: 2024-05-18

## 2024-05-18 RX ORDER — BISACODYL 10 MG
10 SUPPOSITORY, RECTAL RECTAL DAILY PRN
Status: DISCONTINUED | OUTPATIENT
Start: 2024-05-18 | End: 2024-05-30 | Stop reason: HOSPADM

## 2024-05-18 RX ORDER — INSULIN LISPRO 100 [IU]/ML
0-4 INJECTION, SOLUTION INTRAVENOUS; SUBCUTANEOUS
Status: DISCONTINUED | OUTPATIENT
Start: 2024-05-18 | End: 2024-05-18 | Stop reason: HOSPADM

## 2024-05-18 RX ORDER — AMLODIPINE BESYLATE 5 MG/1
5 TABLET ORAL DAILY
Status: DISCONTINUED | OUTPATIENT
Start: 2024-05-19 | End: 2024-05-22

## 2024-05-18 RX ORDER — ALBUTEROL SULFATE 90 UG/1
1 AEROSOL, METERED RESPIRATORY (INHALATION) EVERY 4 HOURS PRN
Status: DISCONTINUED | OUTPATIENT
Start: 2024-05-18 | End: 2024-05-30 | Stop reason: HOSPADM

## 2024-05-18 RX ORDER — POLYETHYLENE GLYCOL 3350 17 G/17G
17 POWDER, FOR SOLUTION ORAL DAILY PRN
Status: CANCELLED | OUTPATIENT
Start: 2024-05-18

## 2024-05-18 RX ORDER — INSULIN LISPRO 100 [IU]/ML
0-4 INJECTION, SOLUTION INTRAVENOUS; SUBCUTANEOUS NIGHTLY
Status: DISCONTINUED | OUTPATIENT
Start: 2024-05-18 | End: 2024-05-18 | Stop reason: SDUPTHER

## 2024-05-18 RX ORDER — DEXTROSE MONOHYDRATE 100 MG/ML
INJECTION, SOLUTION INTRAVENOUS CONTINUOUS PRN
Status: CANCELLED | OUTPATIENT
Start: 2024-05-18

## 2024-05-18 RX ORDER — GLUCAGON 1 MG/ML
1 KIT INJECTION PRN
Status: DISCONTINUED | OUTPATIENT
Start: 2024-05-18 | End: 2024-05-30 | Stop reason: HOSPADM

## 2024-05-18 RX ORDER — SODIUM CHLORIDE 9 MG/ML
INJECTION, SOLUTION INTRAVENOUS PRN
Status: CANCELLED | OUTPATIENT
Start: 2024-05-18

## 2024-05-18 RX ORDER — INSULIN LISPRO 100 [IU]/ML
5 INJECTION, SOLUTION INTRAVENOUS; SUBCUTANEOUS
Status: CANCELLED | OUTPATIENT
Start: 2024-05-18

## 2024-05-18 RX ORDER — BISACODYL 10 MG
10 SUPPOSITORY, RECTAL RECTAL DAILY PRN
Status: CANCELLED | OUTPATIENT
Start: 2024-05-18

## 2024-05-18 RX ORDER — INSULIN LISPRO 100 [IU]/ML
0-4 INJECTION, SOLUTION INTRAVENOUS; SUBCUTANEOUS
Status: CANCELLED | OUTPATIENT
Start: 2024-05-18

## 2024-05-18 RX ORDER — LEVOTHYROXINE SODIUM 0.07 MG/1
75 TABLET ORAL DAILY
Status: DISCONTINUED | OUTPATIENT
Start: 2024-05-19 | End: 2024-05-30 | Stop reason: HOSPADM

## 2024-05-18 RX ORDER — ATORVASTATIN CALCIUM 10 MG/1
20 TABLET, FILM COATED ORAL NIGHTLY
Status: DISCONTINUED | OUTPATIENT
Start: 2024-05-18 | End: 2024-05-30 | Stop reason: HOSPADM

## 2024-05-18 RX ORDER — ONDANSETRON 4 MG/1
4 TABLET, ORALLY DISINTEGRATING ORAL EVERY 8 HOURS PRN
Status: CANCELLED | OUTPATIENT
Start: 2024-05-18

## 2024-05-18 RX ORDER — INSULIN LISPRO 100 [IU]/ML
0-4 INJECTION, SOLUTION INTRAVENOUS; SUBCUTANEOUS NIGHTLY
Status: CANCELLED | OUTPATIENT
Start: 2024-05-18

## 2024-05-18 RX ORDER — ONDANSETRON 2 MG/ML
4 INJECTION INTRAMUSCULAR; INTRAVENOUS EVERY 6 HOURS PRN
Status: CANCELLED | OUTPATIENT
Start: 2024-05-18

## 2024-05-18 RX ORDER — GLUCAGON 1 MG/ML
1 KIT INJECTION PRN
Status: CANCELLED | OUTPATIENT
Start: 2024-05-18

## 2024-05-18 RX ORDER — ACETAMINOPHEN 325 MG/1
650 TABLET ORAL EVERY 6 HOURS PRN
Status: CANCELLED | OUTPATIENT
Start: 2024-05-18

## 2024-05-18 RX ORDER — LEVOTHYROXINE SODIUM 0.07 MG/1
75 TABLET ORAL DAILY
Status: CANCELLED | OUTPATIENT
Start: 2024-05-19

## 2024-05-18 RX ORDER — INSULIN GLARGINE 100 [IU]/ML
10 INJECTION, SOLUTION SUBCUTANEOUS NIGHTLY
Status: CANCELLED | OUTPATIENT
Start: 2024-05-18

## 2024-05-18 RX ORDER — SODIUM CHLORIDE 9 MG/ML
INJECTION, SOLUTION INTRAVENOUS PRN
Status: DISCONTINUED | OUTPATIENT
Start: 2024-05-18 | End: 2024-05-30 | Stop reason: HOSPADM

## 2024-05-18 RX ORDER — PANTOPRAZOLE SODIUM 40 MG/1
40 TABLET, DELAYED RELEASE ORAL
Status: CANCELLED | OUTPATIENT
Start: 2024-05-19

## 2024-05-18 RX ORDER — ENOXAPARIN SODIUM 100 MG/ML
30 INJECTION SUBCUTANEOUS DAILY
Status: DISCONTINUED | OUTPATIENT
Start: 2024-05-19 | End: 2024-05-29

## 2024-05-18 RX ORDER — CARVEDILOL 25 MG/1
25 TABLET ORAL 2 TIMES DAILY
Status: CANCELLED | OUTPATIENT
Start: 2024-05-18

## 2024-05-18 RX ORDER — CARVEDILOL 25 MG/1
25 TABLET ORAL 2 TIMES DAILY
Status: DISCONTINUED | OUTPATIENT
Start: 2024-05-18 | End: 2024-05-30 | Stop reason: HOSPADM

## 2024-05-18 RX ORDER — SENNOSIDES A AND B 8.6 MG/1
2 TABLET, FILM COATED ORAL DAILY
Status: CANCELLED | OUTPATIENT
Start: 2024-05-19

## 2024-05-18 RX ORDER — INSULIN GLARGINE 100 [IU]/ML
10 INJECTION, SOLUTION SUBCUTANEOUS NIGHTLY
Status: DISCONTINUED | OUTPATIENT
Start: 2024-05-18 | End: 2024-05-19

## 2024-05-18 RX ORDER — ONDANSETRON 4 MG/1
4 TABLET, ORALLY DISINTEGRATING ORAL EVERY 8 HOURS PRN
Status: DISCONTINUED | OUTPATIENT
Start: 2024-05-18 | End: 2024-05-30 | Stop reason: HOSPADM

## 2024-05-18 RX ORDER — ASPIRIN 81 MG/1
81 TABLET, CHEWABLE ORAL NIGHTLY
Status: DISCONTINUED | OUTPATIENT
Start: 2024-05-18 | End: 2024-05-30 | Stop reason: HOSPADM

## 2024-05-18 RX ORDER — ENOXAPARIN SODIUM 100 MG/ML
30 INJECTION SUBCUTANEOUS DAILY
Status: CANCELLED | OUTPATIENT
Start: 2024-05-19

## 2024-05-18 RX ORDER — SENNOSIDES A AND B 8.6 MG/1
2 TABLET, FILM COATED ORAL DAILY
Status: DISCONTINUED | OUTPATIENT
Start: 2024-05-19 | End: 2024-05-30 | Stop reason: HOSPADM

## 2024-05-18 RX ORDER — PANTOPRAZOLE SODIUM 40 MG/1
40 TABLET, DELAYED RELEASE ORAL
Status: DISCONTINUED | OUTPATIENT
Start: 2024-05-19 | End: 2024-05-30 | Stop reason: HOSPADM

## 2024-05-18 RX ORDER — FERROUS SULFATE 325(65) MG
325 TABLET ORAL
Status: DISCONTINUED | OUTPATIENT
Start: 2024-05-19 | End: 2024-05-30 | Stop reason: HOSPADM

## 2024-05-18 RX ORDER — ASPIRIN 81 MG/1
81 TABLET, CHEWABLE ORAL NIGHTLY
Status: CANCELLED | OUTPATIENT
Start: 2024-05-18

## 2024-05-18 RX ORDER — AMLODIPINE BESYLATE 5 MG/1
5 TABLET ORAL DAILY
Status: DISCONTINUED | OUTPATIENT
Start: 2024-05-18 | End: 2024-05-18 | Stop reason: HOSPADM

## 2024-05-18 RX ORDER — INSULIN LISPRO 100 [IU]/ML
0-8 INJECTION, SOLUTION INTRAVENOUS; SUBCUTANEOUS
Status: CANCELLED | OUTPATIENT
Start: 2024-05-18

## 2024-05-18 RX ADMIN — LEVOTHYROXINE SODIUM 75 MCG: 0.07 TABLET ORAL at 09:13

## 2024-05-18 RX ADMIN — ACETAMINOPHEN 650 MG: 325 TABLET ORAL at 11:27

## 2024-05-18 RX ADMIN — ASPIRIN 81 MG: 81 TABLET, CHEWABLE ORAL at 20:38

## 2024-05-18 RX ADMIN — FERROUS SULFATE TAB 325 MG (65 MG ELEMENTAL FE) 325 MG: 325 (65 FE) TAB at 09:13

## 2024-05-18 RX ADMIN — CARVEDILOL 25 MG: 25 TABLET, FILM COATED ORAL at 20:38

## 2024-05-18 RX ADMIN — MEROPENEM 500 MG: 500 INJECTION, POWDER, FOR SOLUTION INTRAVENOUS at 15:39

## 2024-05-18 RX ADMIN — INSULIN LISPRO 3 UNITS: 100 INJECTION, SOLUTION INTRAVENOUS; SUBCUTANEOUS at 18:09

## 2024-05-18 RX ADMIN — Medication 2000 UNITS: at 09:13

## 2024-05-18 RX ADMIN — MEROPENEM 500 MG: 500 INJECTION, POWDER, FOR SOLUTION INTRAVENOUS at 02:28

## 2024-05-18 RX ADMIN — CARVEDILOL 25 MG: 25 TABLET, FILM COATED ORAL at 09:13

## 2024-05-18 RX ADMIN — AMLODIPINE BESYLATE 5 MG: 5 TABLET ORAL at 12:57

## 2024-05-18 RX ADMIN — INSULIN LISPRO 5 UNITS: 100 INJECTION, SOLUTION INTRAVENOUS; SUBCUTANEOUS at 18:10

## 2024-05-18 RX ADMIN — Medication 16 G: at 12:47

## 2024-05-18 RX ADMIN — PANTOPRAZOLE SODIUM 40 MG: 40 TABLET, DELAYED RELEASE ORAL at 09:14

## 2024-05-18 RX ADMIN — ATORVASTATIN CALCIUM 20 MG: 10 TABLET, FILM COATED ORAL at 20:38

## 2024-05-18 RX ADMIN — ENOXAPARIN SODIUM 30 MG: 100 INJECTION SUBCUTANEOUS at 09:14

## 2024-05-18 RX ADMIN — SODIUM CHLORIDE, PRESERVATIVE FREE 10 ML: 5 INJECTION INTRAVENOUS at 09:14

## 2024-05-18 RX ADMIN — INSULIN LISPRO 2 UNITS: 100 INJECTION, SOLUTION INTRAVENOUS; SUBCUTANEOUS at 09:21

## 2024-05-18 RX ADMIN — INSULIN GLARGINE 10 UNITS: 100 INJECTION, SOLUTION SUBCUTANEOUS at 20:58

## 2024-05-18 RX ADMIN — INSULIN LISPRO 5 UNITS: 100 INJECTION, SOLUTION INTRAVENOUS; SUBCUTANEOUS at 09:22

## 2024-05-18 RX ADMIN — ACETAMINOPHEN 650 MG: 325 TABLET ORAL at 00:54

## 2024-05-18 ASSESSMENT — PAIN DESCRIPTION - ONSET: ONSET: ON-GOING

## 2024-05-18 ASSESSMENT — PAIN DESCRIPTION - PAIN TYPE: TYPE: CHRONIC PAIN

## 2024-05-18 ASSESSMENT — PAIN DESCRIPTION - ORIENTATION
ORIENTATION: MID
ORIENTATION: RIGHT;LEFT

## 2024-05-18 ASSESSMENT — PAIN SCALES - GENERAL
PAINLEVEL_OUTOF10: 2
PAINLEVEL_OUTOF10: 0
PAINLEVEL_OUTOF10: 4
PAINLEVEL_OUTOF10: 7
PAINLEVEL_OUTOF10: 0
PAINLEVEL_OUTOF10: 0

## 2024-05-18 ASSESSMENT — PAIN DESCRIPTION - DESCRIPTORS
DESCRIPTORS: ACHING
DESCRIPTORS: SHARP

## 2024-05-18 ASSESSMENT — PAIN DESCRIPTION - FREQUENCY: FREQUENCY: INTERMITTENT

## 2024-05-18 ASSESSMENT — PAIN DESCRIPTION - LOCATION
LOCATION: FOOT
LOCATION: BACK

## 2024-05-18 NOTE — PLAN OF CARE
Problem: Discharge Planning  Goal: Discharge to home or other facility with appropriate resources  Outcome: Progressing     Problem: Pain  Goal: Verbalizes/displays adequate comfort level or baseline comfort level  Outcome: Progressing  Flowsheets (Taken 5/17/2024 6134 by Talisha Mello RN)  Verbalizes/displays adequate comfort level or baseline comfort level:   Assess pain using appropriate pain scale   Encourage patient to monitor pain and request assistance     Problem: Safety - Adult  Goal: Free from fall injury  Outcome: Progressing     Problem: Nutrition Deficit:  Goal: Optimize nutritional status  Outcome: Progressing

## 2024-05-18 NOTE — FLOWSHEET NOTE
Physical Therapy Treatment Note  Name: Greg Easton MRN: [id number] :   1940   Date:  2024   Admission Date: 2024 Room:  [facility]   Restrictions/Precautions:          general precautions, contact precautions, fall risk, suprapubic catheter   Communication with other providers:  PT ok per nsg ([facility]).     Subjective:  Patient states:  \"I am feeling ok today.\"   Pain:   Location, Type, Intensity (0/10 to 10/10):  5/10 (tailbone)    Objective:    Observation:  pt seen in semi-streeter's position in bed at beginning of treatment. Agreeable to therapy. Pt left in semi-streeter's position in bed with call light and bed alarm at end of treatment.   Objective Measures:  HR 61 bpm    Treatment, including education/measures:    Bed mobility:  Scooting up in bed: mod A with bed in trendelenburg positioning     Transfers:  Supine->sit mod A with bed rails  Sit->supine mod A for BLEs  Sit->stand mod A from w/c; CGA from raised bed   Stand->sit CGA   Pt required assist for mccrary catheter. Pt moved slowly.     Gait: pt amb 25'+30' CGA with RW. Slow héctor.     Assessment / Impression:    Patient's tolerance of treatment:  Fair    Adverse Reaction: no  Significant change in status and impact:  no  Barriers to improvement:  none     Plan for Next Session:    Gait; exercises; transfers    Time in:  1434  Time out:  1514  Timed treatment minutes:  40  Total treatment time:  40    Previously filed items:  Social/Functional History  Lives With: Son  Type of Home: House  Home Layout: One level  Home Access: Ramped entrance  Bathroom Shower/Tub: Walk-in shower  Bathroom Toilet: Handicap height  Home Equipment: Rollator, Walker - Rolling  Receives Help From: Family  ADL Assistance: Independent  Homemaking Assistance: Independent (shared with son)  Ambulation Assistance: Independent (Ayse with RW for short distances; rollator for long distances)  Transfer Assistance: Independent  Active : No  Patient's

## 2024-05-18 NOTE — H&P
Physical Medicine and Rehabilitation H&P    Name:  Greg Easton Date/Time of Admission: 2024  4:43 PM    CSN: [id number] Attending Provider: Manuel Floyd MD   Room/Bed:  1007/1007-A : 1940 Age: 83 y.o.   Date: 2024 Time: 12:17 PM     ADMITTING DIAGNOSIS/REASON FOR REHAB ADMISSION:   General debility status post hospital stay for UTI, orthostatic hypotension    COMORBID DIAGNOSES IMPACTING REHAB:  Syncope and collapse, chronic suprapubic catheter, history of ureteral stents, diabetes mellitus with peripheral polyneuropathy, chronic low back pain, moderate protein calorie malnutrition    CHIEF COMPLAINT:  Weakness, syncope    HPI  Greg Easton is a 83 y.o. male who is admitted to the [facility] Acute [facility] Unit. Greg Easton presented to [facility] ED on 2024 with hyperglycemia and hyperkalemia (history of chronic kidney disease).  He received IV fluids and hyperkalemia cocktail.  Nephrology was consulted.  He was treated with Cipro for UTI however urine culture  grew over 50,000 mixed pathogens with multiple organisms isolated, no predominance indicating probable contamination..  He has a history of suprapubic catheter.  Urology was consulted.  Suprapubic catheter exchanged per urology with plans to change every 3 weeks going forward.  He underwent cystoscopy with left ureteral stent exchange on  with plans for left ureteral stent exchange every 3 months going forward.  Blood sugars were managed by endocrinology.  He was discharged on  however was readmitted  after syncopal episode with collapse.  Orthostatic vital signs positive on admission.  Cardiology was consulted with echocardiogram revealing 60 to 65% EF, no significant valvular disease.  Carotid ultrasound with no significant disease.  Norvasc initially held however resumed at lower dose at discharge.  P.o. fluid intake encouraged.  SHANNON hose recommended..  Urine culture repeated and found to  catheter        RADIOLOGY/PROCEDURES  Reviewed      LABS  Reviewed        IMPRESSION  Greg Easton is a 83 y.o. male with debility after hospital stay for syncope.  He was found to have ESBL E. coli catheter associated UTI (chronic suprapubic catheter).  He was also treated for orthostatic hypotension.  Of note, he has a history of recent hospital stay for hyperglycemia, hyperkalemia where he underwent suprapubic catheter exchange and left ureteral stent exchange.    Strengths for the patient: Motivated to fully participate in inpatient rehab program to regain functional independence for discharge home.  Has good knowledge of using adaptive equipment and is accepting of its necessity.  Reports good recent premorbid function.    Limitations/barriers for the patient: Peripheral neuropathy impaired gait and balance at baseline which has been exacerbated after recent hospital stay.  Orthostatic issues may limit participation in rehab and will need to be monitored closely.  Patient suprapubic catheter puts him at risk for recurrent UTIs and setbacks in function.    Comorbid Conditions:  History of prostate cancer status post RPP 1996  History of ureteral stent  Chronic urinary retention status post suprapubic catheter  Chronic cystitis  Chronic left hydronephrosis  Chronic low back pain  Multilevel lumbar spondylosis with stenosis  Chronic kidney disease stage IV  Diabetes mellitus with hyperglycemia and associated peripheral polyneuropathy  Hypertension  Anemia of chronic disease  Moderate protein calorie malnutrition      PLAN  Functional Impairments -Acute inpatient rehabilitation to proceed with PT/ minutes 5 out of every 7 days. Work on gait, transfers, ADLs, iADLs, safety awareness, equipment management, medication management, patient/family teaching, pain management.  Caregiver training will be offered. Expected length of stay prior to a supervised level of function for discharge home with a walker and C

## 2024-05-18 NOTE — DISCHARGE SUMMARY
V2.0  Discharge Summary    Name:  Greg Easton /Age/Sex: 1940 (83 y.o. male)   Admit Date: 2024  Discharge Date: 24    MRN & CSN:  [id number] & [id number] Encounter Date and Time 24 10:55 AM EDT    Attending:  Manuel Luu MD Discharging Provider: Anh Garcia PA-C       Hospital Course:     Brief HPI: Greg Easton is a 83 y.o. male who presented with syncope.     Problems addressed during this hospitalization:     Syncope and collapse likely 2/2 orthostasis   -Echo with EF 60%  -Carotid ultrasound unremarkable  -Troponin 43, 44  -Cardiology consulted,  encourage increased p.o. intake, place bilateral compression stockings. OK to restart Norvasc at lower dose . Signed off.   -Orthostatic vital signs and symptoms improved  -PT/OT consulted, patient discharged to ARU.     Acute catheter-associated UTI 2/2 ESBL E. Coli   -Urine culture   with ESBL E. Coli  -CT A/P with mild right hydroureteronephrosis, well-positioned suprapubic catheter and left double-J ureteral stent well-positioned  -Urology consulted, recommended follow-up with Dr. Mckeon in 3 weeks for reevaluation  -ID consulted, recommended to continue IV meropenem x 14 days, may transition to IV ertapenem upon discharge home     Chronic low back pain with peripheral neuropathy  - MRI L-spine with multilevel lumbar spondylosis, mild spinal canal stenosis at L2-3 and L4-5, moderate left L2, L3 and L4 foraminal stenosis  - no red flag symptoms currently   - continue pain control   - PT/OT  - may consider NSG follow-up as outpatient      T2DM with hyperglycemia  -Continue Lantus with scheduled Humalog and medium dose SSI, titrate as needed  -Monitor POCT glucose  -Hypoglycemia management protocol     CKDIV  -Cr 2.2, near baseline  -Avoid nephrotoxins     Moderate protein calorie malnutrition   - dietitian followed.  Continue home supplements     Hypertension   - cardio recommended holding Norvasc in setting of    Component Value Date/Time    [facility] ENTBC 02/11/2018 04:35 AM       Time Spent Discharging patient 35 minutes    Electronically signed by Anh Garcia PA-C on 5/18/2024 at 11:49 AM

## 2024-05-18 NOTE — PROGRESS NOTES
Noted by nursing that patient had mild hypoglycemic episode, blood glucose 68.  Insulin orders adjusted.  Patient ate lunch and blood glucose improved.    Anh Garcia PA-C

## 2024-05-18 NOTE — PROGRESS NOTES
4 Eyes Skin Assessment     NAME:  Greg Easton  YOB: 1940  MEDICAL RECORD NUMBER:  [id number]    The patient is being assessed for  Admission    I agree that at least one RN has performed a thorough Head to Toe Skin Assessment on the patient. ALL assessment sites listed below have been assessed.      Areas assessed by both nurses:    Head, Face, Ears, Shoulders, Back, Chest, Arms, Elbows, Hands, Sacrum. Buttock, Coccyx, Ischium, Legs. Feet and Heels, Under Medical Devices , and Other          Does the Patient have a Wound? No noted wound(s) Has maceration in periarea applied cream.         Judd Prevention initiated by RN: Yes  Wound Care Orders initiated by RN: Yes    Pressure Injury (Stage 3,4, Unstageable, DTI, NWPT, and Complex wounds) if present, place Wound referral order by RN under : No    New Ostomies, if present place, Ostomy referral order under : No     Nurse 1 eSignature: Electronically signed by PARAS ARGUELLES RN on 5/18/24 at 5:04 PM EDT    **SHARE this note so that the co-signing nurse can place an eSignature**    Nurse 2 eSignature: Electronically signed by Eri Drake LPN on 5/18/24 at 6:42 PM EDT

## 2024-05-18 NOTE — PROGRESS NOTES
Occupational Therapy Treatment Note    Name: Greg Easton MRN: [id number] :   1940   Date:  2024   Admission Date: 2024 Room:  3020/3020-A     Primary Problem:  The primary encounter diagnosis was Syncope and collapse. 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     Restrictions/Precautions:           general precautions, fall risk, contact ISO     Communication with other providers: Per chart review and Nurse patient is appropriate for therapeutic intervention.      Subjective:  Patient states:  Agreeable to OT therapy  Pain:   Location, Type, Intensity (0/10 to 10/10):  4/10 back and abdominal area    Objective:    Observation: In supine position receiving meds from RN.   Objective Measures:  alert and oriented    Treatment, including education:  Therapeutic Activity Training:   Therapeutic activity training was instructed today.  Cues were given for safety, sequence, UE/LE placement, awareness, and balance.    Bed mobility:Min A for trunk control  Sitting balance:Good on EOB  Sit/stand transfers:CGA from bed, Min A from toilet and recliner  Functional Mobility: CGA around the room<>bathroom with slow héctor    Activities performed today included bed mobility training, transfers, functional mobility  to increase strength, activity tolerance to facilitate IND c ADL tasks, func transfers / mobility with G safety awareness carryover    Self Care Training:   Cues were given for safety, sequence, UE/LE placement, visual cues, and balance.    Activities performed today included dressing, toileting, personal hygiene, and grooming task.Demo Max A for LB dressing/bathing. Demo SBA for UB bathing, personal hygiene and grooming task while seated on EOB reaching out and crossing midline at tabletop.      Assessment / Impression:    Patient's tolerance of

## 2024-05-19 LAB
GLUCOSE BLD-MCNC: 100 MG/DL (ref 70–99)
GLUCOSE BLD-MCNC: 109 MG/DL (ref 70–99)
GLUCOSE BLD-MCNC: 210 MG/DL (ref 70–99)
GLUCOSE BLD-MCNC: 257 MG/DL (ref 70–99)
GLUCOSE BLD-MCNC: 259 MG/DL (ref 70–99)

## 2024-05-19 PROCEDURE — 82962 GLUCOSE BLOOD TEST: CPT

## 2024-05-19 PROCEDURE — 6370000000 HC RX 637 (ALT 250 FOR IP): Performed by: PHYSICIAN ASSISTANT

## 2024-05-19 PROCEDURE — 1280000000 HC REHAB R&B

## 2024-05-19 PROCEDURE — 6360000002 HC RX W HCPCS: Performed by: PHYSICIAN ASSISTANT

## 2024-05-19 PROCEDURE — 2580000003 HC RX 258: Performed by: PHYSICIAN ASSISTANT

## 2024-05-19 PROCEDURE — 2580000003 HC RX 258: Performed by: PHYSICAL MEDICINE & REHABILITATION

## 2024-05-19 PROCEDURE — 94761 N-INVAS EAR/PLS OXIMETRY MLT: CPT

## 2024-05-19 PROCEDURE — 99222 1ST HOSP IP/OBS MODERATE 55: CPT | Performed by: PHYSICAL MEDICINE & REHABILITATION

## 2024-05-19 RX ORDER — INSULIN GLARGINE 100 [IU]/ML
10 INJECTION, SOLUTION SUBCUTANEOUS NIGHTLY
Status: DISCONTINUED | OUTPATIENT
Start: 2024-05-19 | End: 2024-05-23

## 2024-05-19 RX ORDER — SODIUM CHLORIDE 0.9 % (FLUSH) 0.9 %
5-40 SYRINGE (ML) INJECTION 2 TIMES DAILY
Status: DISCONTINUED | OUTPATIENT
Start: 2024-05-19 | End: 2024-05-30 | Stop reason: HOSPADM

## 2024-05-19 RX ORDER — INSULIN LISPRO 100 [IU]/ML
0-4 INJECTION, SOLUTION INTRAVENOUS; SUBCUTANEOUS
Status: DISCONTINUED | OUTPATIENT
Start: 2024-05-20 | End: 2024-05-30 | Stop reason: HOSPADM

## 2024-05-19 RX ORDER — INSULIN LISPRO 100 [IU]/ML
10 INJECTION, SOLUTION INTRAVENOUS; SUBCUTANEOUS
Status: DISCONTINUED | OUTPATIENT
Start: 2024-05-20 | End: 2024-05-21

## 2024-05-19 RX ADMIN — CARVEDILOL 25 MG: 25 TABLET, FILM COATED ORAL at 09:37

## 2024-05-19 RX ADMIN — FERROUS SULFATE TAB 325 MG (65 MG ELEMENTAL FE) 325 MG: 325 (65 FE) TAB at 09:36

## 2024-05-19 RX ADMIN — SODIUM CHLORIDE: 9 INJECTION, SOLUTION INTRAVENOUS at 16:02

## 2024-05-19 RX ADMIN — INSULIN LISPRO 2 UNITS: 100 INJECTION, SOLUTION INTRAVENOUS; SUBCUTANEOUS at 09:13

## 2024-05-19 RX ADMIN — ENOXAPARIN SODIUM 30 MG: 100 INJECTION SUBCUTANEOUS at 09:37

## 2024-05-19 RX ADMIN — ACETAMINOPHEN 650 MG: 325 TABLET ORAL at 04:02

## 2024-05-19 RX ADMIN — MEROPENEM 500 MG: 500 INJECTION, POWDER, FOR SOLUTION INTRAVENOUS at 16:03

## 2024-05-19 RX ADMIN — INSULIN LISPRO 5 UNITS: 100 INJECTION, SOLUTION INTRAVENOUS; SUBCUTANEOUS at 17:24

## 2024-05-19 RX ADMIN — ATORVASTATIN CALCIUM 20 MG: 10 TABLET, FILM COATED ORAL at 20:55

## 2024-05-19 RX ADMIN — SODIUM CHLORIDE, PRESERVATIVE FREE 10 ML: 5 INJECTION INTRAVENOUS at 21:03

## 2024-05-19 RX ADMIN — CARVEDILOL 25 MG: 25 TABLET, FILM COATED ORAL at 20:55

## 2024-05-19 RX ADMIN — INSULIN LISPRO 2 UNITS: 100 INJECTION, SOLUTION INTRAVENOUS; SUBCUTANEOUS at 17:24

## 2024-05-19 RX ADMIN — LEVOTHYROXINE SODIUM 75 MCG: 0.07 TABLET ORAL at 05:47

## 2024-05-19 RX ADMIN — AMLODIPINE BESYLATE 5 MG: 5 TABLET ORAL at 09:37

## 2024-05-19 RX ADMIN — ASPIRIN 81 MG: 81 TABLET, CHEWABLE ORAL at 20:55

## 2024-05-19 RX ADMIN — INSULIN LISPRO 5 UNITS: 100 INJECTION, SOLUTION INTRAVENOUS; SUBCUTANEOUS at 09:35

## 2024-05-19 RX ADMIN — SENNOSIDES 17.2 MG: 8.6 TABLET, FILM COATED ORAL at 09:37

## 2024-05-19 RX ADMIN — PANTOPRAZOLE SODIUM 40 MG: 40 TABLET, DELAYED RELEASE ORAL at 05:47

## 2024-05-19 RX ADMIN — MEROPENEM 500 MG: 500 INJECTION, POWDER, FOR SOLUTION INTRAVENOUS at 03:54

## 2024-05-19 ASSESSMENT — PAIN DESCRIPTION - LOCATION: LOCATION: ANKLE;HAND;FOOT

## 2024-05-19 ASSESSMENT — PAIN DESCRIPTION - ONSET: ONSET: ON-GOING

## 2024-05-19 ASSESSMENT — PAIN SCALES - GENERAL
PAINLEVEL_OUTOF10: 0
PAINLEVEL_OUTOF10: 6
PAINLEVEL_OUTOF10: 0

## 2024-05-19 ASSESSMENT — PAIN - FUNCTIONAL ASSESSMENT: PAIN_FUNCTIONAL_ASSESSMENT: PREVENTS OR INTERFERES SOME ACTIVE ACTIVITIES AND ADLS

## 2024-05-19 ASSESSMENT — PAIN DESCRIPTION - FREQUENCY: FREQUENCY: INTERMITTENT

## 2024-05-19 ASSESSMENT — PAIN DESCRIPTION - ORIENTATION: ORIENTATION: LEFT;RIGHT

## 2024-05-19 ASSESSMENT — PAIN DESCRIPTION - PAIN TYPE: TYPE: CHRONIC PAIN

## 2024-05-19 ASSESSMENT — PAIN DESCRIPTION - DESCRIPTORS: DESCRIPTORS: ACHING

## 2024-05-19 NOTE — CONSULTS
at [facility] OR    CHANGE OF BLADDER TUBE,COMPLICATED  2024    COLON SURGERY      CYSTOSCOPY Left 2022    LEFT CYSTOSCOPY URETERAL STENT EXCHANGE AND SUPRABUPIC PACE CATHETER EXCHANGE performed by Mirella Wilkins MD at [facility] OR    CYSTOSCOPY Left 10/31/2023    CYSTOSCOPY, LEFT URETERAL STENT EXCHANGE, SUPRAPUBIC TUBE EXCHANGE performed by Mirella Wilkins MD at [facility] OR    CYSTOSCOPY Left 2024    CYSTOSCOPY URETERAL STENT EXCHANGE performed by Mirella Wilkins MD at [facility] OR    OTHER SURGICAL HISTORY  2013    Cysto with removal of artificial sphinter and placement of suprapubic catheter.    PROSTATE SURGERY      PROSTATECTOMY      infection - had cancer       Allergies: No Known Allergies    FHx: family history includes Cancer in his brother, maternal uncle, and mother; Diabetes in his brother, father, maternal grandmother, and sister; Heart Disease in his father; High Blood Pressure in his father and sister; Stroke in his maternal grandfather.    SHx:   Social History     Socioeconomic History    Marital status:     Number of children: 3   Tobacco Use    Smoking status: Former     Current packs/day: 0.00     Types: Cigarettes     Quit date: 1976     Years since quittin.9    Smokeless tobacco: Never   Substance and Sexual Activity    Alcohol use: No     Comment: Quit in     Drug use: No     Social Determinants of Health     Food Insecurity: No Food Insecurity (2024)    Hunger Vital Sign     Worried About Running Out of Food in the Last Year: Never true     Ran Out of Food in the Last Year: Never true   Transportation Needs: No Transportation Needs (2024)    PRAPARE - Transportation     Lack of Transportation (Medical): No     Lack of Transportation (Non-Medical): No   Housing Stability: Low Risk  (2024)    Housing Stability Vital Sign     Unable to Pay for Housing in the Last Year: No     Number of Places Lived in the Last Year: 1     Unstable    amLODIPine (NORVASC) 10 MG tablet Take 1 tablet by mouth daily 5/27/23   Giovany Zamarripa MD   carvedilol (COREG) 25 MG tablet Take 1 tablet by mouth 2 times daily    Celso Espinoza MD   senna (SENOKOT) 8.6 MG tablet Take 2 tablets by mouth daily    Celso Espinoza MD   insulin lispro (HUMALOG) 100 UNIT/ML SOLN injection vial Inject 0-8 Units into the skin 3 times daily (with meals)  No Insulin  200-249 2 Units  250-299 4 Units  300-349 6 Units  Over 349 8 Units and notify physician 11/5/22   Reinier Valentin MD   omeprazole (PRILOSEC) 20 MG delayed release capsule Take 2 capsules by mouth daily    Celso Espinoza MD   levothyroxine (SYNTHROID) 75 MCG tablet Take 1 tablet by mouth Daily    Celso Espinoza MD   aspirin 81 MG chewable tablet Take 1 tablet by mouth daily  Patient taking differently: Take 1 tablet by mouth nightly 4/22/22   Valentino Lopez MD   magnesium hydroxide (MILK OF MAGNESIA) 400 MG/5ML suspension Take 30 mLs by mouth daily as needed for Constipation  Patient not taking: Reported on 4/16/2024    Celso Espinoza MD   acetaminophen (TYLENOL) 325 MG tablet Take 2 tablets by mouth every 6 hours as needed for Pain  Patient not taking: Reported on 5/15/2024 1/6/22   Jude Ryan,        Medications:     Medications:    enoxaparin  30 mg SubCUTAneous Daily    aspirin  81 mg Oral Nightly    atorvastatin  20 mg Oral Nightly    carvedilol  25 mg Oral BID    ferrous sulfate  325 mg Oral Daily with breakfast    insulin glargine  10 Units SubCUTAneous Nightly    levothyroxine  75 mcg Oral Daily    meropenem  500 mg IntraVENous Q12H    pantoprazole  40 mg Oral QAM AC    senna  2 tablet Oral Daily    amLODIPine  5 mg Oral Daily    insulin lispro  0-4 Units SubCUTAneous TID WC    insulin lispro  0-4 Units SubCUTAneous Nightly    insulin lispro  5 Units SubCUTAneous TID WC        Infusions:    sodium chloride 25 mL/hr at 05/19/24 1602    dextrose         PRN

## 2024-05-20 LAB
GLUCOSE BLD-MCNC: 134 MG/DL (ref 70–99)
GLUCOSE BLD-MCNC: 186 MG/DL (ref 70–99)
GLUCOSE BLD-MCNC: 193 MG/DL (ref 70–99)
GLUCOSE BLD-MCNC: 195 MG/DL (ref 70–99)
GLUCOSE BLD-MCNC: 227 MG/DL (ref 70–99)

## 2024-05-20 PROCEDURE — 97166 OT EVAL MOD COMPLEX 45 MIN: CPT

## 2024-05-20 PROCEDURE — 97530 THERAPEUTIC ACTIVITIES: CPT

## 2024-05-20 PROCEDURE — 6370000000 HC RX 637 (ALT 250 FOR IP): Performed by: INTERNAL MEDICINE

## 2024-05-20 PROCEDURE — 97163 PT EVAL HIGH COMPLEX 45 MIN: CPT

## 2024-05-20 PROCEDURE — 2580000003 HC RX 258: Performed by: PHYSICIAN ASSISTANT

## 2024-05-20 PROCEDURE — 82962 GLUCOSE BLOOD TEST: CPT

## 2024-05-20 PROCEDURE — 2580000003 HC RX 258: Performed by: PHYSICAL MEDICINE & REHABILITATION

## 2024-05-20 PROCEDURE — 1280000000 HC REHAB R&B

## 2024-05-20 PROCEDURE — 97116 GAIT TRAINING THERAPY: CPT

## 2024-05-20 PROCEDURE — 97535 SELF CARE MNGMENT TRAINING: CPT

## 2024-05-20 PROCEDURE — 6370000000 HC RX 637 (ALT 250 FOR IP): Performed by: PHYSICIAN ASSISTANT

## 2024-05-20 PROCEDURE — 99232 SBSQ HOSP IP/OBS MODERATE 35: CPT | Performed by: PHYSICAL MEDICINE & REHABILITATION

## 2024-05-20 PROCEDURE — 94761 N-INVAS EAR/PLS OXIMETRY MLT: CPT

## 2024-05-20 PROCEDURE — 6360000002 HC RX W HCPCS: Performed by: PHYSICIAN ASSISTANT

## 2024-05-20 RX ADMIN — ASPIRIN 81 MG: 81 TABLET, CHEWABLE ORAL at 20:17

## 2024-05-20 RX ADMIN — INSULIN GLARGINE 10 UNITS: 100 INJECTION, SOLUTION SUBCUTANEOUS at 20:15

## 2024-05-20 RX ADMIN — LEVOTHYROXINE SODIUM 75 MCG: 0.07 TABLET ORAL at 05:37

## 2024-05-20 RX ADMIN — PANTOPRAZOLE SODIUM 40 MG: 40 TABLET, DELAYED RELEASE ORAL at 05:37

## 2024-05-20 RX ADMIN — ACETAMINOPHEN 650 MG: 325 TABLET ORAL at 00:47

## 2024-05-20 RX ADMIN — INSULIN LISPRO 10 UNITS: 100 INJECTION, SOLUTION INTRAVENOUS; SUBCUTANEOUS at 08:10

## 2024-05-20 RX ADMIN — ACETAMINOPHEN 650 MG: 325 TABLET ORAL at 06:58

## 2024-05-20 RX ADMIN — CARVEDILOL 25 MG: 25 TABLET, FILM COATED ORAL at 08:11

## 2024-05-20 RX ADMIN — ATORVASTATIN CALCIUM 20 MG: 10 TABLET, FILM COATED ORAL at 20:17

## 2024-05-20 RX ADMIN — ENOXAPARIN SODIUM 30 MG: 100 INJECTION SUBCUTANEOUS at 08:10

## 2024-05-20 RX ADMIN — MEROPENEM 500 MG: 500 INJECTION, POWDER, FOR SOLUTION INTRAVENOUS at 03:07

## 2024-05-20 RX ADMIN — AMLODIPINE BESYLATE 5 MG: 5 TABLET ORAL at 08:11

## 2024-05-20 RX ADMIN — MEROPENEM 500 MG: 500 INJECTION, POWDER, FOR SOLUTION INTRAVENOUS at 16:33

## 2024-05-20 RX ADMIN — SODIUM CHLORIDE, PRESERVATIVE FREE 10 ML: 5 INJECTION INTRAVENOUS at 08:10

## 2024-05-20 RX ADMIN — CARVEDILOL 25 MG: 25 TABLET, FILM COATED ORAL at 20:18

## 2024-05-20 RX ADMIN — SODIUM CHLORIDE, PRESERVATIVE FREE 10 ML: 5 INJECTION INTRAVENOUS at 20:19

## 2024-05-20 RX ADMIN — FERROUS SULFATE TAB 325 MG (65 MG ELEMENTAL FE) 325 MG: 325 (65 FE) TAB at 08:11

## 2024-05-20 ASSESSMENT — PAIN DESCRIPTION - DESCRIPTORS
DESCRIPTORS: ACHING
DESCRIPTORS: ACHING

## 2024-05-20 ASSESSMENT — PAIN SCALES - GENERAL
PAINLEVEL_OUTOF10: 0
PAINLEVEL_OUTOF10: 10
PAINLEVEL_OUTOF10: 0

## 2024-05-20 ASSESSMENT — PAIN DESCRIPTION - LOCATION
LOCATION: FOOT
LOCATION: LEG;FOOT

## 2024-05-20 ASSESSMENT — PAIN DESCRIPTION - ORIENTATION
ORIENTATION: LEFT;RIGHT
ORIENTATION: RIGHT;LEFT

## 2024-05-20 ASSESSMENT — PAIN DESCRIPTION - PAIN TYPE: TYPE: CHRONIC PAIN

## 2024-05-20 ASSESSMENT — PAIN DESCRIPTION - ONSET: ONSET: ON-GOING

## 2024-05-20 ASSESSMENT — PAIN DESCRIPTION - FREQUENCY: FREQUENCY: INTERMITTENT

## 2024-05-20 NOTE — PROGRESS NOTES
as above  DVT PPx -Lovenox, early/progressive ambulation.   Disposition - Follow-up with PCP, urology, nephrology. The estimated date of discharge will be determined at the next multidisciplinary rehab team/panel meeting. The patient will begin working with the rehab team and we will conduct weekly meetings to assess the patients goals, progress and discharge plans.      SUBJECTIVE:   No acute events overnight.  Resting in room.  No family present.  No new issues reported.  Working with therapy well.      PHYSICAL EXAM:   VITAL SIGNS:   Vitals:    05/20/24 0845   BP: (!) 144/50   Pulse: 60   Resp:    Temp:    SpO2:      GEN: The patient is alert, no acute distress.  Resting in bed  HEENT: PERRL, Oropharynx clear  RESP: Non-labored breathing.  HEART: Distal limbs well-perfused  ABD: Nondistended  EXT: Trace distal lower limb edema  SKIN: No rash.  NEURO: Stable, no focal neuro changes, no mental status changes.    FUNCTIONAL STATUS/IMPROVEMENT:   Prior (baseline) level of function: Independent.    Current level of function:         Current  IRF-LESTER and Goals:   Occupational Therapy:      :     :                                       Eating:         Oral Hygiene:      UB/LB Bathing:      UB Dressing:           LB Dressing:      [place] Footwear:        Toileting:        Toilet Transfers:  Toilet Transfer  Assistance needed: Partial/moderate assistance  Comment: one personassist with walker to toilet  CARE Score: 3    Physical Therapy:                Bed Mobility:   Sit to Lying  Assistance Needed: Partial/moderate assistance  CARE Score: 3  Roll Left and Right  Assistance Needed: Supervision or touching assistance  Comment: SBA with bed rail with additional assist on mccrary catheter line/bag management  CARE Score: 4  Lying to Sitting on Side of Bed  Assistance Needed: Partial/moderate assistance  Comment: Mod A (required lifting of upper body) with bed rail with additional set-up assist of suprapubic  Meds:  sodium chloride, , PRN  ondansetron, 4 mg, Q8H PRN   Or  ondansetron, 4 mg, Q6H PRN  polyethylene glycol, 17 g, Daily PRN  acetaminophen, 650 mg, Q6H PRN  albuterol sulfate HFA, 1 puff, Q4H PRN  bisacodyl, 10 mg, Daily PRN  dextrose, , Continuous PRN  dextrose bolus, 125 mL, PRN   Or  dextrose bolus, 250 mL, PRN  glucagon (rDNA), 1 mg, PRN  glucose, 4 tablet, PRN            Electronically signed by: Manuel Floyd MD 5/20/2024  1:06 PM      This dictation was created with voice recognition software. While attempts have been made to review the dictation as it is transcribed, on occasion the spoken word can be misinterpreted by the technology leading to omissions or inappropriate words, phrases or sentences.

## 2024-05-20 NOTE — PROGRESS NOTES
Comprehensive Nutrition Assessment    Type and Reason for Visit:  Initial    Nutrition Recommendations/Plan:   Continue carb controlled diet   Will continue to offer diabetic oral nutrition supplement as patient will accept  Will continue to follow up during stay      Malnutrition Assessment:  Malnutrition Status:  Insufficient data (05/20/24 1609)    Context:  Acute Illness       Nutrition Assessment:    Admit to acute rehab unit with debility, syncopal event, chronic cystitis. Currently on carb controlled diet with extra protein as needed. Soft foods as needed, patient able to self select form menu food tolerated with missing teeth. Offered diabetic oral nutrition supplements with meals. Meal and supplement intake recently %. Improving intake during stay, will follow at moderate nutrition risk at this time.    Nutrition Related Findings:    receiving care on visit,  hx CKD-IV, DM, HTN, CHF   meds noted: iron, insulin, senna    unstable glucose endocrinology to follow    Hb 7.6   potassium elevated at times with need for lokelma Wound Type: None       Current Nutrition Intake & Therapies:    Average Meal Intake: %  Average Supplements Intake: %  ADULT DIET; Regular; 5 carb choices (75 gm/meal); Double Protein Portions  ADULT ORAL NUTRITION SUPPLEMENT; Breakfast, Lunch, Dinner; Diabetic Oral Supplement    Anthropometric Measures:  Height: 172.7 cm (5' 7.99\")  Ideal Body Weight (IBW): 154 lbs (70 kg)    Admission Body Weight: 81.2 kg (179 lb 0.2 oz) (5/17/24)  Current Body Weight: 81.9 kg (180 lb 8.9 oz), 117.2 % IBW. Weight Source: Bed Scale  Current BMI (kg/m2): 27.5  Usual Body Weight: 83.9 kg (185 lb) (3/2024)  % Weight Change (Calculated): -2.4  Weight Adjustment For: No Adjustment                 BMI Categories: Overweight (BMI 25.0-29.9)    Estimated Daily Nutrient Needs:  Energy Requirements Based On: Kcal/kg  Weight Used for Energy Requirements: Current  Energy (kcal/day): 3096-5734 (22-27

## 2024-05-20 NOTE — CARE COORDINATION
Case Management Admission Note      Patient:Greg Easton      :1940  MRN:[id number]  Rehab Dx/Hx: Syncope and collapse [R55]  Debility [R53.81]    Chief Complaint:   Past Medical History:   Diagnosis Date    Acute kidney failure (HCC)     Acute urinary tract infection 03/15/2012    Anemia     Ataxia 2010    Ataxia     Bacteremia     Candidiasis     Chronic combined systolic and diastolic congestive heart failure (HCC)     Chronic kidney disease     Cognitive communication deficit     Diabetes mellitus (HCC) 2011    type 2, controlled    Extended spectrum beta lactamase (ESBL) resistance     Fusion of spine of cervical region 10/01/2012    Gait disturbance 2011    History of prostate cancer 1996    adenocarcinoma    History of tobacco use 1959    Hydronephrosis     Hyperkalemia     Hyperlipidemia 2011    Hypertension     Hypertensive heart disease with CHF (congestive heart failure) (HCC)     Hypotension     Immunodeficiency (HCC)     Metabolic encephalopathy     Muscle weakness     Osteoarthritis 2011    Osteoarthritis     Secondary Hyperaldosteronism (HCC)     Supraventricular tachycardia (HCC)     Tubulo-interstitial nephritis      Past Surgical History:   Procedure Laterality Date    BLADDER SURGERY      BLADDER SURGERY Left 2022    CYSTOSCOPY LEFT STENT EXCHANGE performed by Bal Mckeon MD at [facility] OR    CHANGE OF BLADDER TUBE,COMPLICATED  2024    COLON SURGERY      CYSTOSCOPY Left 2022    LEFT CYSTOSCOPY URETERAL STENT EXCHANGE AND SUPRABUPIC PACE CATHETER EXCHANGE performed by Mirella Wilkins MD at [facility] OR    CYSTOSCOPY Left 10/31/2023    CYSTOSCOPY, LEFT URETERAL STENT EXCHANGE, SUPRAPUBIC TUBE EXCHANGE performed by Mirella Wilkins MD at [facility] OR    CYSTOSCOPY Left 2024    CYSTOSCOPY URETERAL STENT EXCHANGE performed by Mirella Wilkins MD at [facility] OR    OTHER SURGICAL HISTORY  2013    Cysto with removal of

## 2024-05-20 NOTE — CONSULTS
Consult Note      Name:  Greg Easton /Age/Sex: 1940  (83 y.o. male)   MRN & CSN:  [id number] & [id number] Encounter Date/Time: 2024 7:52 PM EDT   Location:  [facility] PCP: Jacobo Zazueta MD       Hospital Day: 3    Assessment and Plan:     Patient is a 83-year-old male who was admitted to [facility] consulted postop for medical management.     # Chronic low pack pain with peripheral neuropathy  - MRI L-spine with multilevel lumbar spondylosis.   - Currently in IPR.   - Continue therapy.     # Catheter Assoc. UTI with ESBL E coli  - Continue Merrem, ending on  per ID.   - Hopper replaced on .     # T2DM with hyperglycemia  - Last A1c 8.0% in 2024.  - Continue Insulin Lantus 10 u qhs and Lispro 5 u TIDWM with LCSI.    # Essential hypertension  - Continue Norvasc and Coreg.    # Acquired hypothyroidism  - TSH normal in 2024.  - Continue Synthroid.    # CKD3b/4  - Labs overall stable, avoid nephrotoxic medications.      Checklist:  Advanced directive: full  Diet: cardiac  DVT ppx: Lovenox  Sugar: BG goal of 140-180 while inpatient    Personally reviewed lab studies and imaging.  EKG interpreted personally and results as stated above.  Imaging that was interpreted personally and results as stated above.    History of Present Illness:     Chief Complaint: weakness    Patient is a 83-year-old male with a PMHx of chronic low back pain with peripheral neuropathy, T2DM, HTN, acquired hypothyroidism, CKD3b/4 and hyperlipidemia who was admitted for IPR.  consulted postop for medical management. Doing well in therapy, denied any complaints, tolerated diet well, pain well-controlled.     History obtained from: patient.    ROS:     Pertinent positives and negatives discussed in HPI above.    Objective:       Intake/Output Summary (Last 24 hours) at 2024 0716  Last data filed at 2024 0307  Gross per 24 hour   Intake 420 ml   Output 750 ml   Net -330 ml          Vitals:   Vitals:       amLODIPine (NORVASC) 10 MG tablet Take 1 tablet by mouth daily 5/27/23   Giovany Zamarripa MD   carvedilol (COREG) 25 MG tablet Take 1 tablet by mouth 2 times daily    Celso Espinoza MD   senna (SENOKOT) 8.6 MG tablet Take 2 tablets by mouth daily    Celso Espinoza MD   insulin lispro (HUMALOG) 100 UNIT/ML SOLN injection vial Inject 0-8 Units into the skin 3 times daily (with meals)  No Insulin  200-249 2 Units  250-299 4 Units  300-349 6 Units  Over 349 8 Units and notify physician 11/5/22   Reinier Valentin MD   omeprazole (PRILOSEC) 20 MG delayed release capsule Take 2 capsules by mouth daily    Celso Espinoza MD   levothyroxine (SYNTHROID) 75 MCG tablet Take 1 tablet by mouth Daily    Celso Espinoza MD   aspirin 81 MG chewable tablet Take 1 tablet by mouth daily  Patient taking differently: Take 1 tablet by mouth nightly 4/22/22   Valentino Lopez MD   magnesium hydroxide (MILK OF MAGNESIA) 400 MG/5ML suspension Take 30 mLs by mouth daily as needed for Constipation  Patient not taking: Reported on 4/16/2024    Celso Espinoza MD   acetaminophen (TYLENOL) 325 MG tablet Take 2 tablets by mouth every 6 hours as needed for Pain  Patient not taking: Reported on 5/15/2024 1/6/22   Jude Ryan,        Medications:     Medications:    sodium chloride flush  5-40 mL IntraVENous BID    insulin lispro  0-4 Units SubCUTAneous 2 times per day    insulin glargine  10 Units SubCUTAneous Nightly    insulin lispro  10 Units SubCUTAneous TID WC    enoxaparin  30 mg SubCUTAneous Daily    aspirin  81 mg Oral Nightly    atorvastatin  20 mg Oral Nightly    carvedilol  25 mg Oral BID    ferrous sulfate  325 mg Oral Daily with breakfast    levothyroxine  75 mcg Oral Daily    meropenem  500 mg IntraVENous Q12H    pantoprazole  40 mg Oral QAM AC    senna  2 tablet Oral Daily    amLODIPine  5 mg Oral Daily    insulin lispro  0-4 Units SubCUTAneous TID WC        Infusions:    sodium

## 2024-05-20 NOTE — H&P
Oral Nightly    carvedilol  25 mg Oral BID    ferrous sulfate  325 mg Oral Daily with breakfast    insulin glargine  10 Units SubCUTAneous Nightly    levothyroxine  75 mcg Oral Daily    meropenem  500 mg IntraVENous Q12H    pantoprazole  40 mg Oral QAM AC    senna  2 tablet Oral Daily    amLODIPine  5 mg Oral Daily    insulin lispro  0-4 Units SubCUTAneous TID WC      Infusions:    sodium chloride 25 mL/hr at 05/19/24 1602    dextrose           IMPRESSION    Patient Active Problem List   Diagnosis    Gait disturbance    Generalized weakness    Diabetes mellitus (HCC)    Osteoarthritis    Anemia of chronic disease    Infected implanted bladder sphincter cuff    Escherichia coli urinary tract infection    Hypertension    Sepsis (HCC)    Acute encephalopathy    Type 2 diabetes mellitus with hypoglycemia without coma (HCC)    UTI (urinary tract infection) due to urinary indwelling catheter (HCC)    Hyperkalemia    Acute kidney failure (HCC)    Leg weakness, bilateral    Type 2 diabetes mellitus with neurological manifestations, uncontrolled    Complicated UTI (urinary tract infection)    Essential hypertension    Severe muscle deconditioning    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 (HCC)    Frequent falls    Chronic kidney disease, stage 3a (HCC)    Uncontrolled type 2 diabetes mellitus with hyperglycemia (HCC)    Prostate cancer (HCC)    Suprapubic catheter (HCC)    Sepsis due to urinary tract infection (HCC)    Coagulase negative Staphylococcus bacteremia    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)    Acute metabolic encephalopathy    SVT (supraventricular tachycardia) (HCC)    Acute metabolic encephalopathy due to hypoglycemia    History of prostate cancer    Cigarette nicotine dependence without complication    Altered mental status    Serratia marcescens infection    Hypoglycemia    Fungemia    Pyelonephritis    Bacteremia due to Enterococcus    Chronic combined systolic (congestive) and diastolic  (congestive) heart failure (HCC)    Chronic idiopathic constipation    Constipation    Fecal impaction (HCC)    Stercoral colitis    Syncope and collapse    Debility         RECOMMENDATIONS:      Reviewed POC blood glucose . Labs and X ray results   Reviewed Home and Current Medicines   Will Start On meal/ Correction bolus Humalog/ Lantus Insulin regime   Monitor Blood glucose frequently   Modify  the dose of Insulin/  as needed        Will follow with you  Again thank you for sharing pt's care with me.     Truly yours,       JUANCHO Garcia MD

## 2024-05-20 NOTE — PROGRESS NOTES
Progress Note( Dr. Garcia)  5/20/2024  Subjective:   Admit Date: 5/18/2024  PCP: Jacobo Zazueta MD    Admitted For :      Consulted For:  General debility UTI and orthostatic hypotension  Patient admitted to hospital initially on medical floor on May 13, 2024 and was discharged and transferred to rehab unit on May 19, 2024    Interval History: Better control of blood glucose     Denies any chest pains,   Denies SOB .   Denies nausea or vomiting.   No new bowel or bladder symptoms.       Intake/Output Summary (Last 24 hours) at 5/20/2024 0832  Last data filed at 5/20/2024 0307  Gross per 24 hour   Intake 100 ml   Output 750 ml   Net -650 ml       DATA    CBC: No results for input(s): \"WBC\", \"HGB\", \"PLT\" in the last 72 hours. CMP:No results for input(s): \"NA\", \"K\", \"CL\", \"CO2\", \"BUN\", \"CREATININE\", \"GLU\", \"CALCIUM\", \"PROT\", \"LABALBU\", \"BILITOT\", \"ALKPHOS\", \"AST\", \"ALT\" in the last 72 hours.  Lipids:   Lab Results   Component Value Date/Time    CHOL 171 05/30/2023 06:48 AM    HDL 43 05/30/2023 06:48 AM    TRIG 129 05/30/2023 06:48 AM     Glucose:  Recent Labs     05/19/24  2053 05/20/24  0235 05/20/24  0639   POCGLU 100* 195* 186*     [id number]:  Lab Results   Component Value Date/Time    LABA1C 8.0 05/12/2024 08:26 AM     High Sensitivity TSH:   Lab Results   Component Value Date/Time    TSHHS 3.070 02/02/2024 02:55 AM     Free T3: No results found for: \"FT3\"  Free T4:  Lab Results   Component Value Date/Time    T4FREE 1.55 05/24/2023 01:26 AM       No orders to display        Scheduled Medicines   Medications:    sodium chloride flush  5-40 mL IntraVENous BID    insulin lispro  0-4 Units SubCUTAneous 2 times per day    insulin glargine  10 Units SubCUTAneous Nightly    insulin lispro  10 Units SubCUTAneous TID WC    enoxaparin  30 mg SubCUTAneous Daily    aspirin  81 mg Oral Nightly    atorvastatin  20 mg Oral Nightly    carvedilol  25 mg Oral BID    ferrous sulfate  325 mg Oral Daily with breakfast     due to urinary tract infection (HCC)     Coagulase negative Staphylococcus bacteremia     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)     Acute metabolic encephalopathy     SVT (supraventricular tachycardia) (HCC)     Acute metabolic encephalopathy due to hypoglycemia     History of prostate cancer     Cigarette nicotine dependence without complication     Altered mental status     Serratia marcescens infection     Hypoglycemia     Fungemia     Pyelonephritis     Bacteremia due to Enterococcus     Chronic combined systolic (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 Labs and X ray results   Reviewed Current Medicines   On meal/ Correction bolus Humalog/ Basal Lantus Insulin regime /    Monitor Blood glucose frequently   Modified  the dose of Insulin/ other medicines as needed   Will follow     .     JUANCHO Garcia MD, MD

## 2024-05-20 NOTE — PROGRESS NOTES
Occupational Therapy                              [facility] OCCUPATIONAL THERAPY EVALUATION    Chart Review:  Past Medical History:   Diagnosis Date    Acute kidney failure (HCC)     Acute urinary tract infection 03/15/2012    Anemia     Ataxia 01/01/2010    Ataxia     Bacteremia     Candidiasis     Chronic combined systolic and diastolic congestive heart failure (HCC)     Chronic kidney disease     Cognitive communication deficit     Diabetes mellitus ([facility]) 01/01/2011    type 2, controlled    Extended spectrum beta lactamase (ESBL) resistance     Fusion of spine of cervical region 10/01/2012    Gait disturbance 01/01/2011    History of prostate cancer 01/01/1996    adenocarcinoma    History of tobacco use 01/01/1959    Hydronephrosis     Hyperkalemia     Hyperlipidemia 01/01/2011    Hypertension     Hypertensive heart disease with CHF (congestive heart failure) ([facility])     Hypotension     Immunodeficiency ([facility])     Metabolic encephalopathy     Muscle weakness     Osteoarthritis 01/01/2011    Osteoarthritis     Secondary Hyperaldosteronism ([facility])     Supraventricular tachycardia ([facility])     Tubulo-interstitial nephritis      Past Surgical History:   Procedure Laterality Date    BLADDER SURGERY      BLADDER SURGERY Left 03/17/2022    CYSTOSCOPY LEFT STENT EXCHANGE performed by Bal Mckeon MD at [facility] OR    CHANGE OF BLADDER TUBE,COMPLICATED  5/12/2024    COLON SURGERY      CYSTOSCOPY Left 09/07/2022    LEFT CYSTOSCOPY URETERAL STENT EXCHANGE AND SUPRABUPIC PACE CATHETER EXCHANGE performed by Mirella Wilkins MD at [facility] OR    CYSTOSCOPY Left 10/31/2023    CYSTOSCOPY, LEFT URETERAL STENT EXCHANGE, SUPRAPUBIC TUBE EXCHANGE performed by Mirella Wilkins MD at [facility] OR    CYSTOSCOPY Left 5/13/2024    CYSTOSCOPY URETERAL STENT EXCHANGE performed by Mirella Wilkins MD at [facility] OR    OTHER SURGICAL HISTORY  03/28/2013    Cysto with removal of artificial sphinter and placement of suprapubic catheter.    PROSTATE

## 2024-05-20 NOTE — PROGRESS NOTES
[x]Slow héctor  [x] Narrow TYSHAWN  [] Staggers []Deviated Path  [x] Decreased step length  [x] Decreased step height  []Decreased arm swing  [] Shuffles  [] Decreased head and trunk rotation  []other:        Wheelchair:  w/c Ability: Wheelchair Ability  Uses a Wheelchair and/or Scooter?: No                Balance:        Object: Picking Up Object  Assistance Needed: Supervision or touching assistance  Comment: CGA using 2WW and reacher  CARE Score: 4  Discharge Goal: Independent               Assessment:   Greg Easton is a 83 y.o. male with pmh of prostate cancer, diabetes mellitus, suprapubic catheter, CHF, who presented to [facility] on with syncope and fall. Per patient he states he was standing there he felt the room go black and he fell and hit his left side of his face and head onto the wall his son tried to catch him and landed on top of him. States just prior to losing consciousness he was having nausea and vomiting. States he regained consciousness immediately and continued to have nausea and vomiting did have a loss of bowel movement during the syncopal episode. Patient with headache s/p fall. Pt had just been discharged from the hospital 5/13/24 and had undergone a cystoscopy. Patient has h/o chronic Left Hydronephrosis: Managed with indwelling stent (last exchanged 5/13/24) . CT a/p 5/14/24: Progressive mild right hydroureteronephrosis. Patient was found to have UTI and currently on IV meropenem. Cardiology consulted for syncope. Patient dx with Syncope and collapse 2/2 orthostasis. Patient with chronic back pain and was found to have L2-3 & L4-5 mild canal stenosis. Patient with h/o peripheral neuropathy. Patient also being medically managed for DM and pain management. Patient lives with son and is IND with ADL's and Ayse with RW for transfers and ambulation.    Pt presented with difference in leg circumference (RLE appeared to have edema and pt claims that LLE has lost muscle mass). He moved slowly  and cautiously due to weakness and joint stiffness. He required increased time and effort for basic mobility tasks that he fatigued quickly. He states that his hospital bed at home has a lower surface height that he is able to perform transfers more Ind, however his difficulty getting up from a lower seat height today was apparent. His usual performance will have to be re-assessed from elevated seat height considering he was using a lift chair and BSC frame over toilet at baseline. Pt's ability to perform some mobility tasks at Mod Ind level will also require use of leg bag in succeeding tx sessions.       Body Structures, Functions, Activity Limitations Requiring Skilled Therapeutic Intervention: Decreased ADL status, Decreased functional mobility , Decreased ROM, Decreased body mechanics, Decreased strength, Decreased cognition, Decreased endurance, Decreased sensation, Decreased balance, Decreased high-level IADLs, Increased pain, Decreased posture   Therapy Prognosis: Good  Decision Making: High Complexity  Clinical Presentation: unstable/unpredictable characteristics      Patient education:   ARU schedule, ARU expectations for participation, plan of care  Treatment Initiated:  Functional mobility training, gait training, patient education  Barriers to Improvement: recurrent UTI, DM status, cardiovascular issues, chronic limitations (LBP, CKD, bowel/bladder issues)   Discharge Recommendations:  [facility] PT   Equipment Recommendations:  has 2WW and rollator     Goals:  Patient Goals   Patient Goals : to return home     Long Term Goals  Time Frame for Long Term Goals : 12 tx days:  Long Term Goal 1: Pt will complete rolling R/L using bed rail Mod Ind.  Long Term Goal 2: Pt will complete scooting and sup<->sit using bed features with SBA.  Long Term Goal 3: Pt will complete sit<->stand and stand turn transfers using 2WW Mod Ind.  Long Term Goal 4: Pt will complete car transfers using 2WW with SBA.  Long Term Goal 5: Pt

## 2024-05-21 LAB
ANION GAP SERPL CALCULATED.3IONS-SCNC: 11 MMOL/L (ref 7–16)
BASOPHILS ABSOLUTE: 0 K/CU MM
BASOPHILS RELATIVE PERCENT: 0.4 % (ref 0–1)
BUN SERPL-MCNC: 47 MG/DL (ref 6–23)
CALCIUM SERPL-MCNC: 7.9 MG/DL (ref 8.3–10.6)
CHLORIDE BLD-SCNC: 110 MMOL/L (ref 99–110)
CO2: 19 MMOL/L (ref 21–32)
CREAT SERPL-MCNC: 2.3 MG/DL (ref 0.9–1.3)
DIFFERENTIAL TYPE: ABNORMAL
EOSINOPHILS ABSOLUTE: 0.5 K/CU MM
EOSINOPHILS RELATIVE PERCENT: 7.2 % (ref 0–3)
GFR, ESTIMATED: 27 ML/MIN/1.73M2
GLUCOSE BLD-MCNC: 121 MG/DL (ref 70–99)
GLUCOSE BLD-MCNC: 183 MG/DL (ref 70–99)
GLUCOSE BLD-MCNC: 214 MG/DL (ref 70–99)
GLUCOSE BLD-MCNC: 229 MG/DL (ref 70–99)
GLUCOSE BLD-MCNC: 46 MG/DL (ref 70–99)
GLUCOSE BLD-MCNC: 55 MG/DL (ref 70–99)
GLUCOSE BLD-MCNC: 81 MG/DL (ref 70–99)
GLUCOSE SERPL-MCNC: 200 MG/DL (ref 70–99)
HCT VFR BLD CALC: 23.1 % (ref 42–52)
HEMOGLOBIN: 7.2 GM/DL (ref 13.5–18)
IMMATURE NEUTROPHIL %: 0.3 % (ref 0–0.43)
LYMPHOCYTES ABSOLUTE: 1.5 K/CU MM
LYMPHOCYTES RELATIVE PERCENT: 22.1 % (ref 24–44)
MCH RBC QN AUTO: 30.8 PG (ref 27–31)
MCHC RBC AUTO-ENTMCNC: 31.2 % (ref 32–36)
MCV RBC AUTO: 98.7 FL (ref 78–100)
MONOCYTES ABSOLUTE: 0.6 K/CU MM
MONOCYTES RELATIVE PERCENT: 8.7 % (ref 0–4)
NEUTROPHILS ABSOLUTE: 4.2 K/CU MM
NEUTROPHILS RELATIVE PERCENT: 61.3 % (ref 36–66)
NUCLEATED RBC %: 0 %
PDW BLD-RTO: 15.8 % (ref 11.7–14.9)
PLATELET # BLD: 166 K/CU MM (ref 140–440)
PMV BLD AUTO: 11.5 FL (ref 7.5–11.1)
POTASSIUM SERPL-SCNC: 5.3 MMOL/L (ref 3.5–5.1)
RBC # BLD: 2.34 M/CU MM (ref 4.6–6.2)
SODIUM BLD-SCNC: 140 MMOL/L (ref 135–145)
TOTAL IMMATURE NEUTOROPHIL: 0.02 K/CU MM
TOTAL NUCLEATED RBC: 0 K/CU MM
WBC # BLD: 6.8 K/CU MM (ref 4–10.5)

## 2024-05-21 PROCEDURE — 2580000003 HC RX 258: Performed by: PHYSICIAN ASSISTANT

## 2024-05-21 PROCEDURE — 6370000000 HC RX 637 (ALT 250 FOR IP): Performed by: PHYSICIAN ASSISTANT

## 2024-05-21 PROCEDURE — 1280000000 HC REHAB R&B

## 2024-05-21 PROCEDURE — 2580000003 HC RX 258: Performed by: PHYSICAL MEDICINE & REHABILITATION

## 2024-05-21 PROCEDURE — 94761 N-INVAS EAR/PLS OXIMETRY MLT: CPT

## 2024-05-21 PROCEDURE — 97530 THERAPEUTIC ACTIVITIES: CPT

## 2024-05-21 PROCEDURE — 36415 COLL VENOUS BLD VENIPUNCTURE: CPT

## 2024-05-21 PROCEDURE — 6370000000 HC RX 637 (ALT 250 FOR IP): Performed by: INTERNAL MEDICINE

## 2024-05-21 PROCEDURE — 85025 COMPLETE CBC W/AUTO DIFF WBC: CPT

## 2024-05-21 PROCEDURE — 97116 GAIT TRAINING THERAPY: CPT

## 2024-05-21 PROCEDURE — 97110 THERAPEUTIC EXERCISES: CPT

## 2024-05-21 PROCEDURE — 80048 BASIC METABOLIC PNL TOTAL CA: CPT

## 2024-05-21 PROCEDURE — 82962 GLUCOSE BLOOD TEST: CPT

## 2024-05-21 PROCEDURE — 97535 SELF CARE MNGMENT TRAINING: CPT

## 2024-05-21 PROCEDURE — 99232 SBSQ HOSP IP/OBS MODERATE 35: CPT | Performed by: PHYSICAL MEDICINE & REHABILITATION

## 2024-05-21 PROCEDURE — 6370000000 HC RX 637 (ALT 250 FOR IP): Performed by: NURSE PRACTITIONER

## 2024-05-21 PROCEDURE — 6360000002 HC RX W HCPCS: Performed by: PHYSICIAN ASSISTANT

## 2024-05-21 RX ORDER — ATROPA BELLADONNA AND OPIUM 16.2; 6 MG/1; MG/1
30 SUPPOSITORY RECTAL EVERY 8 HOURS PRN
Status: DISCONTINUED | OUTPATIENT
Start: 2024-05-21 | End: 2024-05-21 | Stop reason: RX

## 2024-05-21 RX ORDER — INSULIN LISPRO 100 [IU]/ML
5 INJECTION, SOLUTION INTRAVENOUS; SUBCUTANEOUS
Status: DISCONTINUED | OUTPATIENT
Start: 2024-05-21 | End: 2024-05-30 | Stop reason: HOSPADM

## 2024-05-21 RX ADMIN — LEVOTHYROXINE SODIUM 75 MCG: 0.07 TABLET ORAL at 05:26

## 2024-05-21 RX ADMIN — INSULIN LISPRO 1 UNITS: 100 INJECTION, SOLUTION INTRAVENOUS; SUBCUTANEOUS at 17:22

## 2024-05-21 RX ADMIN — MEROPENEM 500 MG: 500 INJECTION, POWDER, FOR SOLUTION INTRAVENOUS at 03:21

## 2024-05-21 RX ADMIN — INSULIN LISPRO 4 UNITS: 100 INJECTION, SOLUTION INTRAVENOUS; SUBCUTANEOUS at 08:02

## 2024-05-21 RX ADMIN — PANTOPRAZOLE SODIUM 40 MG: 40 TABLET, DELAYED RELEASE ORAL at 05:26

## 2024-05-21 RX ADMIN — SENNOSIDES 17.2 MG: 8.6 TABLET, FILM COATED ORAL at 08:00

## 2024-05-21 RX ADMIN — SODIUM CHLORIDE, PRESERVATIVE FREE 10 ML: 5 INJECTION INTRAVENOUS at 20:13

## 2024-05-21 RX ADMIN — ASPIRIN 81 MG: 81 TABLET, CHEWABLE ORAL at 20:13

## 2024-05-21 RX ADMIN — INSULIN LISPRO 10 UNITS: 100 INJECTION, SOLUTION INTRAVENOUS; SUBCUTANEOUS at 08:01

## 2024-05-21 RX ADMIN — ATORVASTATIN CALCIUM 20 MG: 10 TABLET, FILM COATED ORAL at 20:14

## 2024-05-21 RX ADMIN — SODIUM ZIRCONIUM CYCLOSILICATE 10 G: 10 POWDER, FOR SUSPENSION ORAL at 11:49

## 2024-05-21 RX ADMIN — CARVEDILOL 25 MG: 25 TABLET, FILM COATED ORAL at 08:00

## 2024-05-21 RX ADMIN — SODIUM CHLORIDE, PRESERVATIVE FREE 10 ML: 5 INJECTION INTRAVENOUS at 08:01

## 2024-05-21 RX ADMIN — CARVEDILOL 25 MG: 25 TABLET, FILM COATED ORAL at 20:14

## 2024-05-21 RX ADMIN — ENOXAPARIN SODIUM 30 MG: 100 INJECTION SUBCUTANEOUS at 08:01

## 2024-05-21 RX ADMIN — INSULIN LISPRO 5 UNITS: 100 INJECTION, SOLUTION INTRAVENOUS; SUBCUTANEOUS at 17:22

## 2024-05-21 RX ADMIN — SODIUM CHLORIDE 20 ML: 9 INJECTION, SOLUTION INTRAVENOUS at 03:20

## 2024-05-21 RX ADMIN — AMLODIPINE BESYLATE 5 MG: 5 TABLET ORAL at 08:00

## 2024-05-21 RX ADMIN — MEROPENEM 500 MG: 500 INJECTION, POWDER, FOR SOLUTION INTRAVENOUS at 16:28

## 2024-05-21 RX ADMIN — FERROUS SULFATE TAB 325 MG (65 MG ELEMENTAL FE) 325 MG: 325 (65 FE) TAB at 08:00

## 2024-05-21 RX ADMIN — Medication 16 G: at 11:39

## 2024-05-21 ASSESSMENT — PAIN SCALES - GENERAL: PAINLEVEL_OUTOF10: 0

## 2024-05-21 NOTE — PROGRESS NOTES
Progress Note( Dr. Garcia)  5/21/2024  Subjective:   Admit Date: 5/18/2024  PCP: Jacobo Zazueta MD    Admitted For :      Consulted For:  General debility UTI and orthostatic hypotension  Patient admitted to hospital initially on medical floor on May 13, 2024 and was discharged and transferred to rehab unit on May 19, 2024    Interval History: Better control of blood glucose     Denies any chest pains,   Denies SOB .   Denies nausea or vomiting.   No new bowel or bladder symptoms.       Intake/Output Summary (Last 24 hours) at 5/21/2024 0817  Last data filed at 5/21/2024 0525  Gross per 24 hour   Intake 600 ml   Output 1700 ml   Net -1100 ml         DATA    CBC:   Recent Labs     05/21/24  0522   WBC 6.8   HGB 7.2*       CMP:  Recent Labs     05/21/24  0522      K 5.3*      CO2 19*   BUN 47*   CREATININE 2.3*   CALCIUM 7.9*     Lipids:   Lab Results   Component Value Date/Time    CHOL 171 05/30/2023 06:48 AM    HDL 43 05/30/2023 06:48 AM    TRIG 129 05/30/2023 06:48 AM     Glucose:  Recent Labs     05/20/24  1943 05/21/24  0134 05/21/24  0728   POCGLU 227* 183* 214*       [id number]:  Lab Results   Component Value Date/Time    LABA1C 8.0 05/12/2024 08:26 AM     High Sensitivity TSH:   Lab Results   Component Value Date/Time    TSHHS 3.070 02/02/2024 02:55 AM     Free T3: No results found for: \"FT3\"  Free T4:  Lab Results   Component Value Date/Time    T4FREE 1.55 05/24/2023 01:26 AM       No orders to display        Scheduled Medicines   Medications:    sodium chloride flush  5-40 mL IntraVENous BID    insulin lispro  0-4 Units SubCUTAneous 2 times per day    insulin glargine  10 Units SubCUTAneous Nightly    insulin lispro  10 Units SubCUTAneous TID     enoxaparin  30 mg SubCUTAneous Daily    aspirin  81 mg Oral Nightly    atorvastatin  20 mg Oral Nightly    carvedilol  25 mg Oral BID    ferrous sulfate  325 mg Oral Daily with breakfast    levothyroxine  75 mcg Oral Daily    meropenem  negative Staphylococcus bacteremia     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)     Acute metabolic encephalopathy     SVT (supraventricular tachycardia) (HCC)     Acute metabolic encephalopathy due to hypoglycemia     History of prostate cancer     Cigarette nicotine dependence without complication     Altered mental status     Serratia marcescens infection     Hypoglycemia     Fungemia     Pyelonephritis     Bacteremia due to Enterococcus     Chronic combined systolic (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 Labs and X ray results   Reviewed Current Medicines   On meal/ Correction bolus Humalog/ Basal Lantus Insulin regime /    Monitor Blood glucose frequently   Modified  the dose of Insulin/ other medicines as needed   Will follow     .     JUANCHO Garcia MD, MD

## 2024-05-21 NOTE — PROGRESS NOTES
Pt reports feeling \"Blah, check my sugar\". Accu check 46, given 2 containers of orange juice with sugar.  Rechcek 55, given glucose tabs, 16 grams.  Recheck is 81, lunch is at the bedside.

## 2024-05-21 NOTE — CONSULTS
pantoprazole  40 mg Oral QAM AC    senna  2 tablet Oral Daily    amLODIPine  5 mg Oral Daily    insulin lispro  0-4 Units SubCUTAneous TID WC        Infusions:    sodium chloride 20 mL (05/21/24 0320)    dextrose         PRN Meds:   sodium chloride, , PRN  ondansetron, 4 mg, Q8H PRN   Or  ondansetron, 4 mg, Q6H PRN  polyethylene glycol, 17 g, Daily PRN  acetaminophen, 650 mg, Q6H PRN  albuterol sulfate HFA, 1 puff, Q4H PRN  bisacodyl, 10 mg, Daily PRN  dextrose, , Continuous PRN  dextrose bolus, 125 mL, PRN   Or  dextrose bolus, 250 mL, PRN  glucagon (rDNA), 1 mg, PRN  glucose, 4 tablet, PRN        Data:     CBC:   Recent Labs     05/21/24  0522   WBC 6.8   HGB 7.2*      MCV 98.7   RDW 15.8*   LYMPHOPCT 22.1*   MONOPCT 8.7*   EOSPCT 7.2*   BASOPCT 0.4   MONOSABS 0.6   EOSABS 0.5   BASOSABS 0.0       CMP:    Recent Labs     05/21/24  0522      K 5.3*      CO2 19*   BUN 47*   CREATININE 2.3*   GLUCOSE 200*   CALCIUM 7.9*       Lipids:   Lab Results   Component Value Date/Time    CHOL 171 05/30/2023 06:48 AM    HDL 43 05/30/2023 06:48 AM    TRIG 129 05/30/2023 06:48 AM     Hemoglobin A1C:   Lab Results   Component Value Date/Time    LABA1C 8.0 05/12/2024 08:26 AM     TSH: No results found for: \"TSH\"  Troponin:   Lab Results   Component Value Date/Time    TROPONINT 0.038 05/21/2023 07:14 PM    TROPONINT 0.044 10/24/2022 12:20 PM    TROPONINT 0.030 04/20/2022 07:30 AM     BNP: No results for input(s): \"PROBNP\" in the last 72 hours.  Lactic Acid: No results for input(s): \"LACTA\" in the last 72 hours.  UA:  Lab Results   Component Value Date/Time    NITRU POSITIVE 05/14/2024 04:45 PM    NITRU NEGATIVE 03/23/2013 02:24 PM    COLORU YELLOW 05/14/2024 04:45 PM    PHUR 7.5 05/14/2024 04:45 PM    WBCUA 218 05/14/2024 04:45 PM    RBCUA 65 05/14/2024 04:45 PM    MUCUS RARE 05/11/2024 04:41 PM    TRICHOMONAS NONE SEEN 05/14/2024 04:45 PM    YEAST OCCASIONAL 05/14/2024 04:45 PM    BACTERIA MANY 05/14/2024 04:45  PM    CLARITYU TURBID 05/14/2024 04:45 PM    LEUKOCYTESUR LARGE NUMBER OR AMOUNT OBSERVED 05/14/2024 04:45 PM    UROBILINOGEN 0.2 05/14/2024 04:45 PM    BILIRUBINUR NEGATIVE 05/14/2024 04:45 PM    BLOODU LARGE NUMBER OR AMOUNT OBSERVED 05/14/2024 04:45 PM    GLUCOSEU 500 05/14/2024 04:45 PM    KETUA NEGATIVE 05/14/2024 04:45 PM    AMORPHOUS RARE 05/14/2024 04:45 PM     Urine Cultures: No results found for: \"LABURIN\"  Blood Cultures: No results found for: \"BC\"  No results found for: \"BLOODCULT2\"  Organism:   Lab Results   Component Value Date/Time    [facility] 02/11/2018 04:35 AM       Radiology results:  No orders to display       Iam Reddy MD  05/21/24 8:16 AM

## 2024-05-21 NOTE — PROGRESS NOTES
Physical Rehabilitation: OCCUPATIONAL THERAPY     [x] daily progress note       [] discharge       Patient Name:  Greg Easton   :  1940 MRN: [id number]  Room:  [address] [place] [address] Date of Admission: 2024  Rehabilitation Diagnosis:   Syncope and collapse [R55]  Debility [R53.81]       Date 2024       Day of ARU Week:  4   Time IN//745   Individual Tx Minutes 60   Group Tx Minutes    Co-Treat Minutes    Concurrent Tx Minutes    TOTAL Tx Time Mins 60   Variance Time    Variance Reason []   Refusal due to:     []   Medical hold/reason:    []   Illness   []   Off Unit for test/procedure  []   Extra time needed to complete task  []   Therapeutic need  []   Make up mins were attempted but pt unable to complete due to (specify)  []   Other (specify):   Restrictions Restrictions/Precautions: Fall Risk, Contact Precautions         Communication with other providers: [x]   OK to see per nursing:     []   Spoke with team member regarding:      Subjective observations and cognitive status: Patient lying in bed watching tv upon approach, pleasant and agreeable to therapy      Pain level/location:  4  /10       Location: stomach spasms   Discharge recommendations  Anticipated discharge date:  TBD  Destination: []home alone   []home alone w assist prn   [x] home w/ family    [] Continuous supervision       []SNF    [] Assisted living     [] Other:   Continued therapy: [x]HHC OT  []OUTPATIENT  OT   [] No Further OT  Equipment needs: None        ADLs:    Eating: Eating  Assistance Needed: Setup or clean-up assistance  Comment: Pt reports intermittent assist with opening packages/containers while on ARU. Reports he can feed self IND.  CARE Score: 5  Discharge Goal: Independent       Oral Hygiene: Oral Hygiene  Assistance Needed: Independent  Comment: Mod I seated sinkside  CARE Score: 6  Discharge Goal: Independent    UB/LB Bathing: Shower/Bathe Self  Assistance Needed: Supervision or touching

## 2024-05-21 NOTE — PROGRESS NOTES
Physical Therapy    [x] daily progress note       [] discharge       Patient Name:  Greg Easton   :  1940 MRN: [id number]  Room:  [address] [place] [address] Date of Admission: 2024  Rehabilitation Diagnosis:   Syncope and collapse [R55]  Debility [R53.81]       Date 2024       Day of ARU Week:  4   Time IN//925  1100/1130  1400/1430   Individual Tx Minutes 60+30+30   Group Tx Minutes    Co-Treat Minutes    Concurrent Tx Minutes    TOTAL Tx Time Mins 120   Variance Time    Variance Time []   Refusal due to:     []   Medical hold/reason:    []   Illness   []   Off Unit for test/procedure  []   Extra time needed to complete task  []   Therapeutic need  []   Attempted make-up minutes, however pt was unable to tolerate due to (specify)   []   Other (specify):   Restrictions Restrictions/Precautions  Restrictions/Precautions: Fall Risk, Contact Precautions  Position Activity Restriction  Other position/activity restrictions: IV access RUE, suprapubic catheter   Interdisciplinary communication [x]   Cleared for therapy per nursing     [x]   (AM2)RN/PCT notified about issues during session (pt's request for his blood glucose to be checked)  []   RN updated on pt performance  []   Spoke with   []   Spoke with OT  []   Spoke with MD  []   Other:    Subjective observations and cognitive status: (AM1) Pt in recliner, ate breakfast, denies need for BM but confirms having a leg bag for his suprapubic catheter.    (AM2) Pt asleep in bed requiring heavy tactile cues to awaken, confirms that leg bag was recently emptied by [facility] staff, requests use of SHANNON hose and shoes during this tx session.  (PM) Pt resting in bed, alert, confirms eating lunch, willing to try bed level thera ex as he is still not feeling confident to get up with the incident of hypoglycemia earlier.    Pain level/location: (AM1&2)  generalized body soreness    Discharge recommendations  Anticipated discharge date:  TBD  Destination: []home  Responsibility: Primary  Cleaning Responsibility: No  Bill Paying/Finance Responsibility: No (one of his sons does)  Shopping Responsibility: No  Dependent Care Responsibility: No  Health Care Management: Secondary  Ambulation Assistance: Independent (Mod Ind using 2WW in the house and using rollator in the community)  Transfer Assistance: Needs assistance (required assistance to get up from shower bench/seat; required some assistance for car transfer)  Active : No  Mode of Transportation: Family, Car  Occupation: Retired  Type of Occupation: manager  Additional Comments: sleeps in hospital bed;    Objective                                                                                    Goals:  (Update in navigator)   :  Long Term Goals  Time Frame for Long Term Goals : 12 tx days:  Long Term Goal 1: Pt will complete rolling R/L using bed rail Mod Ind.  Long Term Goal 2: Pt will complete scooting and sup<->sit using bed features with SBA.  Long Term Goal 3: Pt will complete sit<->stand and stand turn transfers with elevated seat height  using 2WW Mod Ind.  Long Term Goal 4: Pt will complete car transfers using 2WW with SBA.  Long Term Goal 5: Pt will ambulate 10 ft over level and uneven surfaces and 50 ft with turns over level surface using 2WW Mod Ind.  Additional Goals?: Yes  Long term goal 6: Pt will ambulate 150 ft over level surface using 2WW with SBA.  Long term goal 7: Pt will ascend/descend curb step using 2WW and 4-5 steps (4\"/6\" step height) using railings with CGA.  Long term goal 8: Pt will complete object retrieval from the floor with 2WW and using reacher Mod Ind.:        Plan of Care                                                                              Times per week: 5 days per week for a minimum of 60 minutes/day plus group as appropriate for 60 minutes.  Treatment to include Current Treatment Recommendations: Strengthening, ROM, Balance training, Functional mobility training,

## 2024-05-22 LAB
GLUCOSE BLD-MCNC: 106 MG/DL (ref 70–99)
GLUCOSE BLD-MCNC: 124 MG/DL (ref 70–99)
GLUCOSE BLD-MCNC: 164 MG/DL (ref 70–99)
GLUCOSE BLD-MCNC: 193 MG/DL (ref 70–99)
GLUCOSE BLD-MCNC: 203 MG/DL (ref 70–99)
GLUCOSE BLD-MCNC: 60 MG/DL (ref 70–99)
GLUCOSE BLD-MCNC: 72 MG/DL (ref 70–99)

## 2024-05-22 PROCEDURE — 1280000000 HC REHAB R&B

## 2024-05-22 PROCEDURE — 2580000003 HC RX 258: Performed by: PHYSICAL MEDICINE & REHABILITATION

## 2024-05-22 PROCEDURE — 97535 SELF CARE MNGMENT TRAINING: CPT

## 2024-05-22 PROCEDURE — 6370000000 HC RX 637 (ALT 250 FOR IP): Performed by: INTERNAL MEDICINE

## 2024-05-22 PROCEDURE — 94761 N-INVAS EAR/PLS OXIMETRY MLT: CPT

## 2024-05-22 PROCEDURE — 6370000000 HC RX 637 (ALT 250 FOR IP): Performed by: NURSE PRACTITIONER

## 2024-05-22 PROCEDURE — 6370000000 HC RX 637 (ALT 250 FOR IP): Performed by: PHYSICIAN ASSISTANT

## 2024-05-22 PROCEDURE — 97530 THERAPEUTIC ACTIVITIES: CPT

## 2024-05-22 PROCEDURE — 6360000002 HC RX W HCPCS: Performed by: PHYSICIAN ASSISTANT

## 2024-05-22 PROCEDURE — 97116 GAIT TRAINING THERAPY: CPT

## 2024-05-22 PROCEDURE — 82962 GLUCOSE BLOOD TEST: CPT

## 2024-05-22 PROCEDURE — 2580000003 HC RX 258: Performed by: PHYSICIAN ASSISTANT

## 2024-05-22 PROCEDURE — 97110 THERAPEUTIC EXERCISES: CPT

## 2024-05-22 PROCEDURE — 99232 SBSQ HOSP IP/OBS MODERATE 35: CPT | Performed by: PHYSICAL MEDICINE & REHABILITATION

## 2024-05-22 RX ORDER — AMLODIPINE BESYLATE 10 MG/1
10 TABLET ORAL DAILY
Status: DISCONTINUED | OUTPATIENT
Start: 2024-05-22 | End: 2024-05-30 | Stop reason: HOSPADM

## 2024-05-22 RX ADMIN — CARVEDILOL 25 MG: 25 TABLET, FILM COATED ORAL at 20:41

## 2024-05-22 RX ADMIN — INSULIN LISPRO 5 UNITS: 100 INJECTION, SOLUTION INTRAVENOUS; SUBCUTANEOUS at 08:37

## 2024-05-22 RX ADMIN — SODIUM CHLORIDE, PRESERVATIVE FREE 10 ML: 5 INJECTION INTRAVENOUS at 20:40

## 2024-05-22 RX ADMIN — AMLODIPINE BESYLATE 10 MG: 10 TABLET ORAL at 08:36

## 2024-05-22 RX ADMIN — SODIUM CHLORIDE 20 ML: 9 INJECTION, SOLUTION INTRAVENOUS at 03:13

## 2024-05-22 RX ADMIN — CARVEDILOL 25 MG: 25 TABLET, FILM COATED ORAL at 08:36

## 2024-05-22 RX ADMIN — LEVOTHYROXINE SODIUM 75 MCG: 0.07 TABLET ORAL at 05:32

## 2024-05-22 RX ADMIN — MEROPENEM 500 MG: 500 INJECTION, POWDER, FOR SOLUTION INTRAVENOUS at 03:13

## 2024-05-22 RX ADMIN — ENOXAPARIN SODIUM 30 MG: 100 INJECTION SUBCUTANEOUS at 08:36

## 2024-05-22 RX ADMIN — SENNOSIDES 17.2 MG: 8.6 TABLET, FILM COATED ORAL at 08:36

## 2024-05-22 RX ADMIN — PANTOPRAZOLE SODIUM 40 MG: 40 TABLET, DELAYED RELEASE ORAL at 05:32

## 2024-05-22 RX ADMIN — SODIUM CHLORIDE, PRESERVATIVE FREE 10 ML: 5 INJECTION INTRAVENOUS at 08:43

## 2024-05-22 RX ADMIN — ATORVASTATIN CALCIUM 20 MG: 10 TABLET, FILM COATED ORAL at 20:41

## 2024-05-22 RX ADMIN — INSULIN LISPRO 5 UNITS: 100 INJECTION, SOLUTION INTRAVENOUS; SUBCUTANEOUS at 12:20

## 2024-05-22 RX ADMIN — ASPIRIN 81 MG: 81 TABLET, CHEWABLE ORAL at 20:41

## 2024-05-22 RX ADMIN — ACETAMINOPHEN 650 MG: 325 TABLET ORAL at 01:44

## 2024-05-22 RX ADMIN — MEROPENEM 500 MG: 500 INJECTION, POWDER, FOR SOLUTION INTRAVENOUS at 16:16

## 2024-05-22 RX ADMIN — FERROUS SULFATE TAB 325 MG (65 MG ELEMENTAL FE) 325 MG: 325 (65 FE) TAB at 08:36

## 2024-05-22 ASSESSMENT — PAIN SCALES - GENERAL
PAINLEVEL_OUTOF10: 0
PAINLEVEL_OUTOF10: 4

## 2024-05-22 ASSESSMENT — PAIN DESCRIPTION - FREQUENCY: FREQUENCY: INTERMITTENT

## 2024-05-22 ASSESSMENT — PAIN DESCRIPTION - ORIENTATION: ORIENTATION: LEFT;RIGHT

## 2024-05-22 ASSESSMENT — PAIN DESCRIPTION - PAIN TYPE: TYPE: CHRONIC PAIN

## 2024-05-22 ASSESSMENT — PAIN DESCRIPTION - ONSET: ONSET: ON-GOING

## 2024-05-22 ASSESSMENT — PAIN - FUNCTIONAL ASSESSMENT: PAIN_FUNCTIONAL_ASSESSMENT: ACTIVITIES ARE NOT PREVENTED

## 2024-05-22 ASSESSMENT — PAIN DESCRIPTION - LOCATION: LOCATION: FOOT

## 2024-05-22 ASSESSMENT — PAIN DESCRIPTION - DESCRIPTORS: DESCRIPTORS: DISCOMFORT

## 2024-05-22 NOTE — PROGRESS NOTES
Progress Note( Dr. Garcia)  5/22/2024  Subjective:   Admit Date: 5/18/2024  PCP: Jacobo Zazueta MD    Admitted For :      Consulted For:  General debility UTI and orthostatic hypotension  Patient admitted to hospital initially on medical floor on May 13, 2024 and was discharged and transferred to rehab unit on May 19, 2024    Interval History: Better control of blood glucose patient had an episode of hypoglycemia yesterday       Denies any chest pains,   Denies SOB .   Denies nausea or vomiting.   No new bowel or bladder symptoms.       Intake/Output Summary (Last 24 hours) at 5/22/2024 0816  Last data filed at 5/22/2024 0529  Gross per 24 hour   Intake 360 ml   Output 1450 ml   Net -1090 ml         DATA    CBC:   Recent Labs     05/21/24 0522   WBC 6.8   HGB 7.2*         CMP:  Recent Labs     05/21/24 0522      K 5.3*      CO2 19*   BUN 47*   CREATININE 2.3*   CALCIUM 7.9*       Lipids:   Lab Results   Component Value Date/Time    CHOL 171 05/30/2023 06:48 AM    HDL 43 05/30/2023 06:48 AM    TRIG 129 05/30/2023 06:48 AM     Glucose:  Recent Labs     05/21/24  1954 05/22/24  0141 05/22/24  0751   POCGLU 121* 124* 193*       [id number]:  Lab Results   Component Value Date/Time    LABA1C 8.0 05/12/2024 08:26 AM     High Sensitivity TSH:   Lab Results   Component Value Date/Time    TSHHS 3.070 02/02/2024 02:55 AM     Free T3: No results found for: \"FT3\"  Free T4:  Lab Results   Component Value Date/Time    T4FREE 1.55 05/24/2023 01:26 AM       No orders to display        Scheduled Medicines   Medications:    amLODIPine  10 mg Oral Daily    insulin lispro  5 Units SubCUTAneous TID WC    sodium chloride flush  5-40 mL IntraVENous BID    insulin lispro  0-4 Units SubCUTAneous 2 times per day    insulin glargine  10 Units SubCUTAneous Nightly    enoxaparin  30 mg SubCUTAneous Daily    aspirin  81 mg Oral Nightly    atorvastatin  20 mg Oral Nightly    carvedilol  25 mg Oral BID    ferrous   Sepsis due to urinary tract infection (HCC)     Coagulase negative Staphylococcus bacteremia     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)     Acute metabolic encephalopathy     SVT (supraventricular tachycardia) (HCC)     Acute metabolic encephalopathy due to hypoglycemia     History of prostate cancer     Cigarette nicotine dependence without complication     Altered mental status     Serratia marcescens infection     Hypoglycemia     Fungemia     Pyelonephritis     Bacteremia due to Enterococcus     Chronic combined systolic (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 Labs and X ray results   Reviewed Current Medicines   On meal/ Correction bolus Humalog/ Basal Lantus Insulin regime /    Monitor Blood glucose frequently   Modified  the dose of Insulin/ other medicines as needed   Will follow     .     JUANCHO Garcia MD, MD

## 2024-05-22 NOTE — PROGRESS NOTES
Physical Medicine and Rehabilitation Progress Note    Name:  Greg Easton Date/Time of Admission: 2024  4:43 PM    CSN: [id number] Attending Provider: Manuel Floyd MD   Room/Bed:  1007/1007-A : 1940 Age: 83 y.o.   Date: 2024 Time: 12:28 PM       IMPRESSION  Greg Easton is a 83 y.o. male with debility after hospital stay for syncope.  He was found to have ESBL E. coli catheter associated UTI (chronic suprapubic catheter).  He was also treated for orthostatic hypotension.  Of note, he has a history of recent hospital stay for hyperglycemia, hyperkalemia where he underwent suprapubic catheter exchange and left ureteral stent exchange.     Strengths for the patient: Motivated to fully participate in inpatient rehab program to regain functional independence for discharge home.  Has good knowledge of using adaptive equipment and is accepting of its necessity.  Reports good recent premorbid function.     Limitations/barriers for the patient: Peripheral neuropathy impaired gait and balance at baseline which has been exacerbated after recent hospital stay.  Orthostatic issues may limit participation in rehab and will need to be monitored closely.  Patient suprapubic catheter puts him at risk for recurrent UTIs and setbacks in function.     Comorbid Conditions:  History of prostate cancer status post RPP   History of ureteral stent  Chronic urinary retention status post suprapubic catheter  Chronic cystitis  Chronic left hydronephrosis  Chronic low back pain  Multilevel lumbar spondylosis with stenosis  Chronic kidney disease stage IV  Diabetes mellitus with hyperglycemia and associated peripheral polyneuropathy  Hypertension  Anemia of chronic disease  Moderate protein calorie malnutrition        PLAN  Functional Impairments -Acute inpatient rehabilitation to proceed with PT/ minutes 5 out of every 7 days. Work on gait, transfers, ADLs, iADLs, safety awareness, equipment management,  Manuel Floyd MD 5/22/2024  12:28 PM      This dictation was created with voice recognition software. While attempts have been made to review the dictation as it is transcribed, on occasion the spoken word can be misinterpreted by the technology leading to omissions or inappropriate words, phrases or sentences.

## 2024-05-22 NOTE — CONSULTS
Consult Note      Name:  Greg Easton /Age/Sex: 1940  (83 y.o. male)   MRN & CSN:  [id number] & [id number] Encounter Date/Time: 2024 7:52 PM EDT   Location:  [facility] PCP: Jacobo Zazueta MD       Hospital Day: 5    Assessment and Plan:     Patient is a 83-year-old male who was admitted to [facility] consulted postop for medical management.     # Chronic low pack pain with peripheral neuropathy  - MRI L-spine with multilevel lumbar spondylosis.   - Currently in IPR.   - Continue therapy.     # Catheter Assoc. UTI with ESBL E coli  # Bladder Spasms  - Continue Merrem, ending on  per ID.   - Hopper replaced on .   -Urology Consulted: Jon Cherry PA-C, consider trial of Myrbetriq or Gemtesa as outpatient, will discuss with Dr. Mckeon, FU with him in 1-2 weeks for re-evaluation of SPT exchange     # T2DM with hyperglycemia  - Last A1c 8.0% in 2024.  - Continue Insulin Lantus 10 u qhs and Lispro 5 u TIDWM with LCSI.  -glucose dropped  evening to 46, 55, Dr. Escobar is following and treating his DM    # Essential hypertension  - Continue Norvasc and Coreg.  -BP consistently elevated, will increase Norvasc to 10 mg on     # Acquired hypothyroidism  - TSH normal in 2024.  - Continue Synthroid.    # CKD3b/4  - Labs overall stable, avoid nephrotoxic medications.      Checklist:  Advanced directive: full  Diet: cardiac  DVT ppx: Lovenox  Sugar: BG goal of 140-180 while inpatient    Personally reviewed lab studies and imaging.  EKG interpreted personally and results as stated above.  Imaging that was interpreted personally and results as stated above.    History of Present Illness:     Chief Complaint: weakness    Patient is a 83-year-old male with a PMHx of chronic low back pain with peripheral neuropathy, T2DM, HTN, acquired hypothyroidism, CKD3b/4 and hyperlipidemia who was admitted for IPR.  consulted postop for medical management. Doing well in therapy, denied any complaints,  hours.  UA:  Lab Results   Component Value Date/Time    NITRU POSITIVE 05/14/2024 04:45 PM    NITRU NEGATIVE 03/23/2013 02:24 PM    COLORU YELLOW 05/14/2024 04:45 PM    PHUR 7.5 05/14/2024 04:45 PM    WBCUA 218 05/14/2024 04:45 PM    RBCUA 65 05/14/2024 04:45 PM    MUCUS RARE 05/11/2024 04:41 PM    TRICHOMONAS NONE SEEN 05/14/2024 04:45 PM    YEAST OCCASIONAL 05/14/2024 04:45 PM    BACTERIA MANY 05/14/2024 04:45 PM    CLARITYU TURBID 05/14/2024 04:45 PM    LEUKOCYTESUR LARGE NUMBER OR AMOUNT OBSERVED 05/14/2024 04:45 PM    UROBILINOGEN 0.2 05/14/2024 04:45 PM    BILIRUBINUR NEGATIVE 05/14/2024 04:45 PM    BLOODU LARGE NUMBER OR AMOUNT OBSERVED 05/14/2024 04:45 PM    GLUCOSEU 500 05/14/2024 04:45 PM    KETUA NEGATIVE 05/14/2024 04:45 PM    AMORPHOUS RARE 05/14/2024 04:45 PM     Urine Cultures: No results found for: \"LABURIN\"  Blood Cultures: No results found for: \"BC\"  No results found for: \"BLOODCULT2\"  Organism:   Lab Results   Component Value Date/Time    ORG ENTBC 02/11/2018 04:35 AM       Radiology results:  No orders to display       Iam Reddy MD  05/22/24 7:12 AM

## 2024-05-22 NOTE — CONSULTS
in order to meet ALARA standards for radiation dose reduction.  In addition to vendor specific dose reduction algorithms, the dose reduction techniques vary based on the specific scanner utilized but frequently include automated exposure control, adjustment of the mA and/or kV according to patient size, and use of iterative reconstruction technique. IV contrast: None. FINDINGS: ALIGNMENT: Mild scoliosis. BONES: No fracture or destructive osseous process. PARASPINAL SOFT TISSUES: Normal. VISUALIZED LUNGS AND MEDIASTINUM: Atherosclerosis. Dilated main pulmonary arterial trunk indicative of underlying pulmonary arterial hypertension. Calcified mediastinal lymph nodes. Calcified splenic granuloma. Small gastroesophageal hiatal hernia. DISC LEVELS: Partially imaged cervical spine fusion hardware. Multilevel disc height loss with endplate osteophytes. No significant stenosis.     No CT evidence for acute thoracic spine fracture Electronically signed by Santiago Moore DO    CT CERVICAL SPINE WO CONTRAST    Result Date: 5/14/2024  EXAM: CT CERVICAL SPINE WO CONTRAST INDICATION: fall, COMPARISON: CT cervical spine April 14, 2024 TECHNIQUE: Axial CT imaging obtained through the cervical spine. Axial images and multiplanar reformatted images reviewed.   Individualized dose optimization technique was used in order to meet ALARA standards for radiation dose reduction.  In addition to vendor specific dose reduction algorithms, the dose reduction techniques vary based on the specific scanner utilized but frequently include automated exposure control, adjustment of the mA and/or kV according to patient size, and use of iterative reconstruction technique. IV contrast: None. FINDINGS: ALIGNMENT: Cervical spine straightening, mild scoliosis. BONES: No fracture or destructive osseous process. No change. CT to C7 posterior fusion hardware. Bilateral pedicle screws and vertically oriented fixation rods. Laminectomy defects. NECK SOFT  Contrast: None.  FINDINGS: INTRACRANIAL HEMORRHAGE: None. VENTRICLES: Unremarkable. No hydrocephalus. BRAIN PARENCHYMA: Gray-white matter differentiation is normal. No intracranial mass effect. No change. Central atrophy. Moderate distribution of subcortical and periventricular white matter low-attenuation changes. Chronic left thalamic and bilateral basal ganglia lacunar infarcts SKULL: No destructive osseous process or fracture. PARANASAL SINUSES AND MASTOIDS: Similar mixed attenuation maxillary sinuses, worse on the right. Evaluate for superimposed fungal process. ORBITS: Surgically absent ocular lenses. EXTRACRANIAL SOFT TISSUES: Normal.     No change. No acute intracranial hemorrhage. Electronically signed by Santiago Moore DO    XR HAND LEFT (MIN 3 VIEWS)    Result Date: 5/14/2024  EXAM: XR HAND LEFT (MIN 3 VIEWS) INDICATION: injury TECHNIQUE: radiographic views: XR HAND LEFT (MIN 3 VIEWS) COMPARISON: None FINDINGS: BONES: No acute abnormality. Osseous demineralization ALIGNMENT: Normal. JOINT SPACES: No dislocation. Mild diffuse degenerative changes. SOFT TISSUES: Normal. OTHER FINDINGS: None.     No acute fracture Electronically signed by Santiago Moore DO    XR CHEST PORTABLE    Result Date: 5/14/2024  EXAM: XR CHEST PORTABLE INDICATION: Syncope TECHNIQUE: Portable frontal chest radiographs. COMPARISON: Chest x-ray October 26, 2023 FINDINGS: HEART / MEDIASTINUM: Normal heart size. Atherosclerosis. LUNGS/PLEURA: No change. . No focal airspace consolidation. Usually increased pulmonary interstitial markings. No effusion. No pneumothorax. BONES / SOFT TISSUES: No acute abnormality. OTHER: Cervical spine fusion hardware, partially imaged.     1. No acute disease.  No change.  Interstitial lung disease. Electronically signed by Santiago Moore DO    FL LESS THAN 1 HOUR    Result Date: 5/13/2024  Radiology exam is complete. No Radiologist dictation. Please follow up with ordering provider.       Assessment & Plan:

## 2024-05-22 NOTE — FLOWSHEET NOTE
[x] daily progress note       [] discharge       Patient Name:  Greg Easton   :  1940 MRN: [id number]  Room:  [address] [place] [address] Date of Admission: 2024  Rehabilitation Diagnosis:   Syncope and collapse [R55]  Debility [R53.81]       Date 2024       Day of ARU Week:  5   Time IN/-1000   Individual Tx Minutes 60   TOTAL Tx Time Mins 60   Restrictions Restrictions/Precautions  Restrictions/Precautions: Fall Risk, Contact Precautions  Position Activity Restriction  Other position/activity restrictions: IV access RUE, suprapubic catheter   Communication with other providers: [x]   OK to see per nursing:     []   Spoke with team member regarding:      Subjective observations and cognitive status: Pt seen sitting up in recliner at beginning of treatment. Agreeable to therapy.    Pain level/location:    0/10         Discharge recommendations   Anticipated discharge date:  TBD  Destination: []home alone   []home alone with assist PRN     [x] home w/ family      [] Continuous supervision  []SNF    [] Assisted living     [] Other:  Continued therapy: []C PT  []OUTPATIENT PT   [] No Further PT  []SNF PT  Caregiver training recommended: []Yes  [] No   Equipment needs: has 2WW and rollator       [x]   Pt received out of bed     Transfers:    Sit--> Stand:  SBA  Stand --> Sit:   SBA  Stand pivot transfers:   SBA  Assistive device required for transfer:   RW    Gait:    Distance:  200'+150'   Assistance:  SBA  Device:  RW  Gait Quality:  reciprocal pattern     Uneven Surfaces:       Assistance:    SBA  Device:    RW  Surfaces Completed:   [x]  Carpeted Surface with bean bags beneath      []  Throw rugs       []  Ramp       []  Outdoor pavements        []  Grass             []  Loose gravel        []  Other:       Pt amb up/down 4\" curb step SBA with RW.     Patient/Caregiver Education and Training:   [x]   Transfer technique/safety  [x]   Gait technique/sequencing  [x]   Proper use of assistive device  [x]    Responsibilities: Yes  Meal Prep Responsibility: Secondary (does simple meal prep for self)  Laundry Responsibility: Primary  Cleaning Responsibility: No  Bill Paying/Finance Responsibility: No (one of his sons does)  Shopping Responsibility: No  Dependent Care Responsibility: No  Health Care Management: Secondary  Ambulation Assistance: Independent (Mod Ind using 2WW in the house and using rollator in the community)  Transfer Assistance: Needs assistance (required assistance to get up from shower bench/seat; required some assistance for car transfer)  Active : No  Mode of Transportation: Family, Car  Occupation: Retired  Type of Occupation: manager  Additional Comments: sleeps in hospital bed;    Objective                                                                                    Goals:  (Update in navigator)   :  Long Term Goals  Time Frame for Long Term Goals : 12 tx days:  Long Term Goal 1: Pt will complete rolling R/L using bed rail Mod Ind.  Long Term Goal 2: Pt will complete scooting and sup<->sit using bed features with SBA.  Long Term Goal 3: Pt will complete sit<->stand and stand turn transfers with elevated seat height  using 2WW Mod Ind.  Long Term Goal 4: Pt will complete car transfers using 2WW with SBA.  Long Term Goal 5: Pt will ambulate 10 ft over level and uneven surfaces and 50 ft with turns over level surface using 2WW Mod Ind.  Additional Goals?: Yes  Long term goal 6: Pt will ambulate 150 ft over level surface using 2WW with SBA.  Long term goal 7: Pt will ascend/descend curb step using 2WW and 4-5 steps (4\"/6\" step height) using railings with CGA.  Long term goal 8: Pt will complete object retrieval from the floor with 2WW and using reacher Mod Ind.:        Plan of Care                                                                              Times per week: 5 days per week for a minimum of 60 minutes/day plus group as appropriate for 60 minutes.  Treatment to include Current

## 2024-05-22 NOTE — PATIENT CARE CONFERENCE
ACUTE REHAB TEAM CONFERENCE SUMMARY   [facility]    NAME: Greg Easton  : 1940 ADMIT DATE: 2024    Rehab Admitting Dx:Syncope and collapse [R55]  Debility [R53.81]  Patient Comorbid Conditions:   Active Hospital Problems    Diagnosis Date Noted    Debility [R53.81] 2024     Date: 2024    CASE MANAGEMENT  Current issues/needs regarding patient and family discharge status: IV @ dc  Patient and family goal:  dc home with son. Ramped entrance    PHYSICAL THERAPY (Updated in QI)        Long Term Goals  Time Frame for Long Term Goals : 12 tx days:  Long Term Goal 1: Pt will complete rolling R/L using bed rail Mod Ind.  Long Term Goal 2: Pt will complete scooting and sup<->sit using bed features with SBA.  Long Term Goal 3: Pt will complete sit<->stand and stand turn transfers with elevated seat height  using 2WW Mod Ind.  Long Term Goal 4: Pt will complete car transfers using 2WW with SBA.  Long Term Goal 5: Pt will ambulate 10 ft over level and uneven surfaces and 50 ft with turns over level surface using 2WW Mod Ind.  Additional Goals?: Yes  Long term goal 6: Pt will ambulate 150 ft over level surface using 2WW with SBA.  Long term goal 7: Pt will ascend/descend curb step using 2WW and 4-5 steps (4\"/6\" step height) using railings with CGA.  Long term goal 8: Pt will complete object retrieval from the floor with 2WW and using reacher Mod Ind.    Impairments/deficits, barriers:    Body Structures, Functions, Activity Limitations Requiring Skilled Therapeutic Intervention: Decreased ADL status, Decreased functional mobility , Decreased ROM, Decreased body mechanics, Decreased strength, Decreased cognition, Decreased endurance, Decreased sensation, Decreased balance, Decreased high-level IADLs, Increased pain, Decreased posture     Therapy Prognosis: Good  Decision Making: High Complexity  Clinical Presentation: unstable/unpredictable characteristics  Equipment needed at

## 2024-05-22 NOTE — PROGRESS NOTES
Physical Rehabilitation: OCCUPATIONAL THERAPY     [x] daily progress note       [] discharge       Patient Name:  Greg Easton   :  1940 MRN: [id number]  Room:  [address] [place] [address] Date of Admission: 2024  Rehabilitation Diagnosis:   Syncope and collapse [R55]  Debility [R53.81]       Date 2024       Day of ARU Week:  5   Time IN//750; 1305/1335   Individual Tx Minutes 90 + 30   Group Tx Minutes    Co-Treat Minutes    Concurrent Tx Minutes    TOTAL Tx Time Mins 120   Variance Time    Variance Reason []   Refusal due to:     []   Medical hold/reason:    []   Illness   []   Off Unit for test/procedure  []   Extra time needed to complete task  []   Therapeutic need  []   Make up mins were attempted but pt unable to complete due to (specify)  []   Other (specify):   Restrictions Restrictions/Precautions: Fall Risk, Contact Precautions         Communication with other providers: [x]   OK to see per nursing:     []   Spoke with team member regarding:      Subjective observations and cognitive status: Patient in semi fowlers upon approach, c/o stomach pain, however states they are not new and is his baseline, requests toilet, pleasant and agreeable to shower      Pain level/location:    10       Location: stomach spasms does not quantify    Discharge recommendations  Anticipated discharge date:  TBD  Destination: []home alone   []home alone w assist prn   [x] home w/ family    [] Continuous supervision       []SNF    [] Assisted living     [] Other:   Continued therapy: [x]HHC OT  []OUTPATIENT  OT   [] No Further OT  Equipment needs: None        ADLs:    Eating: Eating  Assistance Needed: Setup or clean-up assistance  Comment: Pt reports intermittent assist with opening packages/containers while on ARU. Reports he can feed self IND.  CARE Score: 5  Discharge Goal: Independent       Oral Hygiene: Oral Hygiene  Assistance Needed: Independent  Comment: Mod I seated sinkside  CARE Score: 6  Discharge

## 2024-05-23 LAB
GLUCOSE BLD-MCNC: 117 MG/DL (ref 70–99)
GLUCOSE BLD-MCNC: 169 MG/DL (ref 70–99)
GLUCOSE BLD-MCNC: 185 MG/DL (ref 70–99)
GLUCOSE BLD-MCNC: 219 MG/DL (ref 70–99)
GLUCOSE BLD-MCNC: 231 MG/DL (ref 70–99)
GLUCOSE BLD-MCNC: 73 MG/DL (ref 70–99)

## 2024-05-23 PROCEDURE — 2580000003 HC RX 258: Performed by: PHYSICIAN ASSISTANT

## 2024-05-23 PROCEDURE — 97535 SELF CARE MNGMENT TRAINING: CPT

## 2024-05-23 PROCEDURE — 6360000002 HC RX W HCPCS: Performed by: PHYSICIAN ASSISTANT

## 2024-05-23 PROCEDURE — 97110 THERAPEUTIC EXERCISES: CPT

## 2024-05-23 PROCEDURE — 6370000000 HC RX 637 (ALT 250 FOR IP): Performed by: PHYSICIAN ASSISTANT

## 2024-05-23 PROCEDURE — 82962 GLUCOSE BLOOD TEST: CPT

## 2024-05-23 PROCEDURE — 6370000000 HC RX 637 (ALT 250 FOR IP): Performed by: INTERNAL MEDICINE

## 2024-05-23 PROCEDURE — 97530 THERAPEUTIC ACTIVITIES: CPT

## 2024-05-23 PROCEDURE — 6370000000 HC RX 637 (ALT 250 FOR IP): Performed by: NURSE PRACTITIONER

## 2024-05-23 PROCEDURE — 97116 GAIT TRAINING THERAPY: CPT

## 2024-05-23 PROCEDURE — 94761 N-INVAS EAR/PLS OXIMETRY MLT: CPT

## 2024-05-23 PROCEDURE — 2580000003 HC RX 258: Performed by: PHYSICAL MEDICINE & REHABILITATION

## 2024-05-23 PROCEDURE — 99232 SBSQ HOSP IP/OBS MODERATE 35: CPT | Performed by: PHYSICAL MEDICINE & REHABILITATION

## 2024-05-23 PROCEDURE — 1280000000 HC REHAB R&B

## 2024-05-23 RX ORDER — INSULIN GLARGINE 100 [IU]/ML
5 INJECTION, SOLUTION SUBCUTANEOUS NIGHTLY
Status: DISCONTINUED | OUTPATIENT
Start: 2024-05-23 | End: 2024-05-30 | Stop reason: HOSPADM

## 2024-05-23 RX ADMIN — CARVEDILOL 25 MG: 25 TABLET, FILM COATED ORAL at 21:53

## 2024-05-23 RX ADMIN — LEVOTHYROXINE SODIUM 75 MCG: 0.07 TABLET ORAL at 05:46

## 2024-05-23 RX ADMIN — CARVEDILOL 25 MG: 25 TABLET, FILM COATED ORAL at 08:45

## 2024-05-23 RX ADMIN — ASPIRIN 81 MG: 81 TABLET, CHEWABLE ORAL at 21:53

## 2024-05-23 RX ADMIN — MEROPENEM 500 MG: 500 INJECTION, POWDER, FOR SOLUTION INTRAVENOUS at 03:18

## 2024-05-23 RX ADMIN — SODIUM CHLORIDE 20 ML: 9 INJECTION, SOLUTION INTRAVENOUS at 03:18

## 2024-05-23 RX ADMIN — ATORVASTATIN CALCIUM 20 MG: 10 TABLET, FILM COATED ORAL at 21:53

## 2024-05-23 RX ADMIN — SODIUM CHLORIDE, PRESERVATIVE FREE 10 ML: 5 INJECTION INTRAVENOUS at 08:45

## 2024-05-23 RX ADMIN — INSULIN LISPRO 5 UNITS: 100 INJECTION, SOLUTION INTRAVENOUS; SUBCUTANEOUS at 08:46

## 2024-05-23 RX ADMIN — MEROPENEM 500 MG: 500 INJECTION, POWDER, FOR SOLUTION INTRAVENOUS at 15:59

## 2024-05-23 RX ADMIN — ENOXAPARIN SODIUM 30 MG: 100 INJECTION SUBCUTANEOUS at 08:45

## 2024-05-23 RX ADMIN — INSULIN LISPRO 1 UNITS: 100 INJECTION, SOLUTION INTRAVENOUS; SUBCUTANEOUS at 08:46

## 2024-05-23 RX ADMIN — SODIUM CHLORIDE, PRESERVATIVE FREE 10 ML: 5 INJECTION INTRAVENOUS at 22:48

## 2024-05-23 RX ADMIN — INSULIN LISPRO 5 UNITS: 100 INJECTION, SOLUTION INTRAVENOUS; SUBCUTANEOUS at 17:22

## 2024-05-23 RX ADMIN — PANTOPRAZOLE SODIUM 40 MG: 40 TABLET, DELAYED RELEASE ORAL at 05:46

## 2024-05-23 RX ADMIN — INSULIN LISPRO 5 UNITS: 100 INJECTION, SOLUTION INTRAVENOUS; SUBCUTANEOUS at 12:52

## 2024-05-23 RX ADMIN — FERROUS SULFATE TAB 325 MG (65 MG ELEMENTAL FE) 325 MG: 325 (65 FE) TAB at 08:45

## 2024-05-23 RX ADMIN — AMLODIPINE BESYLATE 10 MG: 10 TABLET ORAL at 08:45

## 2024-05-23 RX ADMIN — SODIUM CHLORIDE 25 ML/HR: 9 INJECTION, SOLUTION INTRAVENOUS at 15:57

## 2024-05-23 RX ADMIN — SENNOSIDES 17.2 MG: 8.6 TABLET, FILM COATED ORAL at 08:45

## 2024-05-23 ASSESSMENT — PAIN DESCRIPTION - PAIN TYPE: TYPE: CHRONIC PAIN

## 2024-05-23 ASSESSMENT — PAIN DESCRIPTION - FREQUENCY: FREQUENCY: INTERMITTENT

## 2024-05-23 ASSESSMENT — PAIN DESCRIPTION - LOCATION: LOCATION: FOOT

## 2024-05-23 ASSESSMENT — PAIN SCALES - GENERAL: PAINLEVEL_OUTOF10: 3

## 2024-05-23 ASSESSMENT — PAIN DESCRIPTION - ONSET: ONSET: ON-GOING

## 2024-05-23 ASSESSMENT — PAIN - FUNCTIONAL ASSESSMENT: PAIN_FUNCTIONAL_ASSESSMENT: PREVENTS OR INTERFERES SOME ACTIVE ACTIVITIES AND ADLS

## 2024-05-23 ASSESSMENT — PAIN DESCRIPTION - ORIENTATION: ORIENTATION: RIGHT;LEFT

## 2024-05-23 ASSESSMENT — PAIN DESCRIPTION - DESCRIPTORS: DESCRIPTORS: ACHING

## 2024-05-23 NOTE — FLOWSHEET NOTE
[x] daily progress note       [] discharge       Patient Name:  Greg Easton   :  1940 MRN: [id number]  Room:  [address] [place] [address] Date of Admission: 2024  Rehabilitation Diagnosis:   Syncope and collapse [R55]  Debility [R53.81]       Date 2024       Day of ARU Week:  6   Time IN/-1000   Individual Tx Minutes 60   TOTAL Tx Time Mins 60   Restrictions Restrictions/Precautions  Restrictions/Precautions: Fall Risk, Contact Precautions  Position Activity Restriction  Other position/activity restrictions: IV access RUE, suprapubic catheter   Communication with other providers: [x]   OK to see per nursing:     []   Spoke with team member regarding:      Subjective observations and cognitive status: Pt seen sitting up in recliner at beginning of treatment. Agreeable to therapy.    Pain level/location: 0/10       Discharge recommendations  Anticipated discharge date:  TBD  Destination: []home alone   []home alone with assist PRN     [x] home w/ family      [] Continuous supervision  []SNF    [] Assisted living     [] Other:  Continued therapy: []HHC PT  []OUTPATIENT PT   [] No Further PT  []SNF PT  Caregiver training recommended: []Yes  [] No   Equipment needs: has 2WW and rollator    [x]   Pt received out of bed     Transfers:    Sit--> Stand:  SBA  Stand --> Sit:   SBA  Stand pivot transfers:   SBA  Assistive device required for transfer:   RW    Gait:    Distance:  163'+182'   Assistance:  SBA  Device:  RW  Gait Quality:  reciprocal pattern; slow héctor    Uneven Surfaces:       Assistance:    SBA  Device:    RW  Surfaces Completed:   [x]  Carpeted Surfaces with bean bags beneath      []  Throw rugs       []  Ramp       []  Outdoor pavements        []  Grass             []  Loose gravel        []  Other:       Pt amb up/down 4\" curb step SBA with RW.     Additional Therapeutic activities/exercises completed this date:     []   Nu-step:  Time:        Level:         #Steps:       []   Rebounder:    []   Seated     []  Standing        []   Balance training         []   Postural training    []   Supine ther ex (reps/sets):     [x]   Seated ther ex (reps/sets):  LAQs, marching, concentric leg press (manual resist) x 10 reps each for strengthening.    []   Standing ther ex (reps/sets):     []   Picking up object from floor (standing):                   []   Reacher used   []   Other:   []   Other:    Patient/Caregiver Education and Training:   [x]   Bed Mobility/Transfer technique/safety  [x]   Gait technique/sequencing  [x]   Proper use of assistive device  [x]   Advanced mobility safety and technique  []   Reinforced patient's precautions/mobility while maintaining precautions  []   Postural awareness  []   Family training      Treatment Plan for Next Session: car transfer; gait; bed mobility; object retrieval; exercises       Assessment:  This pt demonstrated a positive response to today's treatment as evidenced by improved mobility.  The patient is making progress toward established goals as evidenced by QI scores. Ongoing deficits are observed in the areas of strength, balance, endurance, and safety and continued focus on this is recommended.     Treatment/Activity Tolerance:   [x] Tolerated treatment with no adverse effects    [] Patient limited by fatigue  [] Patient limited by pain   [] Patient limited by medical complications:    [] Adverse reaction to Tx:   [] Significant change in status    Safety:       []  bed alarm set    [x]  chair alarm set    []  Pt refused alarms                []  Telesitter activated      [x]  Gait belt used during tx session      [x]other: pt left sitting up in recliner with call light at end of treatment.          Number of Minutes/Billable Intervention  Gait Training 30   Therapeutic Exercise 15   Neuro Re-Ed    Therapeutic Activity 15   Wheelchair Propulsion    Group    Other:    TOTAL 60         Social History  Social/Functional History  Lives With: Son (\"Home\" (on disability

## 2024-05-23 NOTE — CARE COORDINATION
Patient was reviewed at today's care conference.  He'll have his last IV dose 5/29 and discharge home 5/30.  He has all needed DME.  The patient doesn't care for [facility].  Outpatient PT recommended.    Case mgt updated patient in room.  He's pleased with dc date and plan and is happy therapy took him outside today.  He'll speak with his son about outpatient PT transportation, but doesn't think they'll be any problem.  His son will provide dc transport.  Confirmed he has no DME needs.  Whiteboard updated.  Patient will update his son.

## 2024-05-23 NOTE — PROGRESS NOTES
Physical Rehabilitation: OCCUPATIONAL THERAPY     [x] daily progress note       [] discharge       Patient Name:  Greg Easton   :  1940 MRN: [id number]  Room:  [address] [place] [address] Date of Admission: 2024  Rehabilitation Diagnosis:   Syncope and collapse [R55]  Debility [R53.81]       Date 2024       Day of ARU Week:  6   Time IN//755; 1310/1340   Individual Tx Minutes 90 +  30    Group Tx Minutes    Co-Treat Minutes    Concurrent Tx Minutes    TOTAL Tx Time Mins 120   Variance Time    Variance Reason []   Refusal due to:     []   Medical hold/reason:    []   Illness   []   Off Unit for test/procedure  []   Extra time needed to complete task  []   Therapeutic need  []   Make up mins were attempted but pt unable to complete due to (specify)  []   Other (specify):   Restrictions Restrictions/Precautions: Fall Risk, Contact Precautions         Communication with other providers: [x]   OK to see per nursing:     []   Spoke with team member regarding:      Subjective observations and cognitive status: Patient in semi fowlers watching TV upon approach, states he did not sleep well previous night 2* to having bowel issues, agreeable to shower      Pain level/location:    /10       Location: discomfort in bilateral shoulders (biofreeze applied)   Discharge recommendations  Anticipated discharge date:  TBD  Destination: []home alone   []home alone w assist prn   [x] home w/ family    [] Continuous supervision       []SNF    [] Assisted living     [] Other:   Continued therapy: [x]HHC OT  []OUTPATIENT  OT   [] No Further OT  Equipment needs: None        ADLs:    Eating: Eating  Assistance Needed: Independent  Comment: X  CARE Score: 6  Discharge Goal: Independent       Oral Hygiene: Oral Hygiene  Assistance Needed: Independent  Comment: X  CARE Score: 6  Discharge Goal: Independent    UB/LB Bathing: Shower/Bathe Self  Assistance Needed: Independent  Comment: Majority seated; uses LHS for distal BLE  CARE  of Care                                                                              Times per week: 5 days per week for a minimum of 60 minutes/day plus group as appropriate for 60 minutes.  Treatment to include Occupational Therapy Plan  Current Treatment Recommendations: Strengthening, ROM, Balance training, Functional mobility training, Endurance training, Pain management, Safety education & training, Patient/Caregiver education & training, Equipment evaluation, education, & procurement, Positioning, Self-Care / ADL, Home management training, Cognitive/Perceptual training    Electronically signed by   KHARI Torres,  5/23/2024, 7:07 AM

## 2024-05-23 NOTE — PROGRESS NOTES
Physical Medicine and Rehabilitation Progress Note    Name:  Greg Easton Date/Time of Admission: 2024  4:43 PM    CSN: [id number] Attending Provider: Manuel Floyd MD   Room/Bed:  1007/1007-A : 1940 Age: 83 y.o.   Date: 2024 Time: 2:48 PM       IMPRESSION  Greg Easton is a 83 y.o. male with debility after hospital stay for syncope.  He was found to have ESBL E. coli catheter associated UTI (chronic suprapubic catheter).  He was also treated for orthostatic hypotension.  Of note, he has a history of recent hospital stay for hyperglycemia, hyperkalemia where he underwent suprapubic catheter exchange and left ureteral stent exchange.     Strengths for the patient: Motivated to fully participate in inpatient rehab program to regain functional independence for discharge home.  Has good knowledge of using adaptive equipment and is accepting of its necessity.  Reports good recent premorbid function.     Limitations/barriers for the patient: Peripheral neuropathy impaired gait and balance at baseline which has been exacerbated after recent hospital stay.  Orthostatic issues may limit participation in rehab and will need to be monitored closely.  Patient suprapubic catheter puts him at risk for recurrent UTIs and setbacks in function.     Comorbid Conditions:  History of prostate cancer status post RPP   History of ureteral stent  Chronic urinary retention status post suprapubic catheter  Chronic cystitis  Chronic left hydronephrosis  Chronic low back pain  Multilevel lumbar spondylosis with stenosis  Chronic kidney disease stage IV  Diabetes mellitus with hyperglycemia and associated peripheral polyneuropathy  Hypertension  Anemia of chronic disease  Moderate protein calorie malnutrition        PLAN  Functional Impairments -Acute inpatient rehabilitation to proceed with PT/ minutes 5 out of every 7 days. Work on gait, transfers, ADLs, iADLs, safety awareness, equipment management, medication  Transfer  Assistance Needed: Substantial/maximal assistance  Comment: required lifting of BLE to get in car; holding assist of trunk to get in and out of car; required use of 2WW and assist on suprapubic catheter line/bag during transfers  CARE Score: 2  Discharge Goal: Supervision or touching assistance    Ambulation:    Walking Ability  Does the Patient Walk?: Yes     Walk 10 Feet  Assistance Needed: Supervision or touching assistance  Comment: SBA with RW  CARE Score: 4  Discharge Goal: Independent     Walk 50 Feet with Two Turns  Assistance Needed: Supervision or touching assistance  Comment: SBA with RW  Reason if not Attempted: Not attempted due to medical condition or safety concerns  CARE Score: 4  Discharge Goal: Independent     Walk 150 Feet  Assistance Needed: Supervision or touching assistance  Comment: SBA with RW  Reason if not Attempted: Not attempted due to medical condition or safety concerns  CARE Score: 4  Discharge Goal: Supervision or touching assistance     Walking 10 Feet on Uneven Surfaces  Assistance Needed: Supervision or touching assistance  Comment: SBA with RW  Reason if not Attempted: Not attempted due to medical condition or safety concerns  CARE Score: 4  Discharge Goal: Independent     1 Step (Curb)  Assistance Needed: Supervision or touching assistance  Comment: SBA with RW (4\" curb step)  Reason if not Attempted: Not attempted due to medical condition or safety concerns  CARE Score: 4  Discharge Goal: Supervision or touching assistance     4 Steps  Comment: limited by fatigue  Reason if not Attempted: Not attempted due to medical condition or safety concerns  CARE Score: 88  Discharge Goal: Supervision or touching assistance     12 Steps  Comment: pt was not performing this at baseline; limited potential to progressing to this mobility task  Reason if not Attempted: Not applicable  CARE Score: 9  Discharge Goal: Not Applicable       Wheelchair:  w/c Ability: Wheelchair

## 2024-05-23 NOTE — CONSULTS
Consult Note      Name:  Greg Easton /Age/Sex: 1940  (83 y.o. male)   MRN & CSN:  [id number] & [id number] Encounter Date/Time: 2024 7:52 PM EDT   Location:  [facility] PCP: Jacobo Zazueta MD       Hospital Day: 6    Assessment and Plan:     Patient is a 83-year-old male who was admitted to [facility] consulted postop for medical management.     # Chronic low pack pain with peripheral neuropathy  - MRI L-spine with multilevel lumbar spondylosis.   - Currently in IPR.   - Continue therapy.     # Catheter Assoc. UTI with ESBL E coli  # Bladder Spasms  - Continue Merrem, ending on  per ID.   - Hopper replaced on .   -Urology Consulted: Jon Cherry PA-C, consider trial of Myrbetriq or Gemtesa as outpatient, will discuss with Dr. cMkeon, FU with him in 1-2 weeks for re-evaluation of SPT exchange     # T2DM with hyperglycemia  - Last A1c 8.0% in 2024.  - Continue Insulin Lantus 10 u qhs and Lispro 5 u TIDWM with LCSI.  -glucose dropped  evening to 46, 55, Dr. Escobar is following and treating his DM    # Essential hypertension  - Continue Norvasc and Coreg.  -BP consistently elevated, will increase Norvasc to 10 mg on     # Acquired hypothyroidism  - TSH normal in 2024.  - Continue Synthroid.    # CKD3b/4  - Labs overall stable, avoid nephrotoxic medications.      Checklist:  Advanced directive: full  Diet: cardiac  DVT ppx: Lovenox  Sugar: BG goal of 140-180 while inpatient    Personally reviewed lab studies and imaging.  EKG interpreted personally and results as stated above.  Imaging that was interpreted personally and results as stated above.    History of Present Illness:     Chief Complaint: weakness    Patient is a 83-year-old male with a PMHx of chronic low back pain with peripheral neuropathy, T2DM, HTN, acquired hypothyroidism, CKD3b/4 and hyperlipidemia who was admitted for IPR.  consulted postop for medical management. Doing well in therapy, denied any complaints,  cancer 1996    adenocarcinoma    History of tobacco use 1959    Hydronephrosis     Hyperkalemia     Hyperlipidemia 2011    Hypertension     Hypertensive heart disease with CHF (congestive heart failure) (HCC)     Hypotension     Immunodeficiency (HCC)     Metabolic encephalopathy     Muscle weakness     Osteoarthritis 2011    Osteoarthritis     Secondary Hyperaldosteronism (HCC)     Supraventricular tachycardia (HCC)     Tubulo-interstitial nephritis        PSHx:   Past Surgical History:   Procedure Laterality Date    BLADDER SURGERY      BLADDER SURGERY Left 2022    CYSTOSCOPY LEFT STENT EXCHANGE performed by Bal Mckeon MD at [facility] OR    CHANGE OF BLADDER TUBE,COMPLICATED  2024    COLON SURGERY      CYSTOSCOPY Left 2022    LEFT CYSTOSCOPY URETERAL STENT EXCHANGE AND SUPRABUPIC PACE CATHETER EXCHANGE performed by Mirella Wilkins MD at [facility] OR    CYSTOSCOPY Left 10/31/2023    CYSTOSCOPY, LEFT URETERAL STENT EXCHANGE, SUPRAPUBIC TUBE EXCHANGE performed by Mirella Wilkins MD at [facility] OR    CYSTOSCOPY Left 2024    CYSTOSCOPY URETERAL STENT EXCHANGE performed by Mirella Wilkins MD at [facility] OR    OTHER SURGICAL HISTORY  2013    Cysto with removal of artificial sphinter and placement of suprapubic catheter.    PROSTATE SURGERY      PROSTATECTOMY      infection - had cancer       Allergies: No Known Allergies    FHx: family history includes Cancer in his brother, maternal uncle, and mother; Diabetes in his brother, father, maternal grandmother, and sister; Heart Disease in his father; High Blood Pressure in his father and sister; Stroke in his maternal grandfather.    SHx:   Social History     Socioeconomic History    Marital status:     Number of children: 3   Tobacco Use    Smoking status: Former     Current packs/day: 0.00     Types: Cigarettes     Quit date: 1976     Years since quittin.9    Smokeless tobacco: Never

## 2024-05-24 LAB
ANION GAP SERPL CALCULATED.3IONS-SCNC: 10 MMOL/L (ref 7–16)
BASOPHILS ABSOLUTE: 0 K/CU MM
BASOPHILS RELATIVE PERCENT: 0.6 % (ref 0–1)
BUN SERPL-MCNC: 52 MG/DL (ref 6–23)
CALCIUM SERPL-MCNC: 8.1 MG/DL (ref 8.3–10.6)
CHLORIDE BLD-SCNC: 112 MMOL/L (ref 99–110)
CO2: 19 MMOL/L (ref 21–32)
CREAT SERPL-MCNC: 2.3 MG/DL (ref 0.9–1.3)
DIFFERENTIAL TYPE: ABNORMAL
EOSINOPHILS ABSOLUTE: 0.5 K/CU MM
EOSINOPHILS RELATIVE PERCENT: 7.6 % (ref 0–3)
GFR, ESTIMATED: 27 ML/MIN/1.73M2
GLUCOSE BLD-MCNC: 134 MG/DL (ref 70–99)
GLUCOSE BLD-MCNC: 168 MG/DL (ref 70–99)
GLUCOSE BLD-MCNC: 173 MG/DL (ref 70–99)
GLUCOSE BLD-MCNC: 181 MG/DL (ref 70–99)
GLUCOSE BLD-MCNC: 267 MG/DL (ref 70–99)
GLUCOSE SERPL-MCNC: 115 MG/DL (ref 70–99)
HCT VFR BLD CALC: 23.4 % (ref 42–52)
HEMOGLOBIN: 6.8 GM/DL (ref 13.5–18)
IMMATURE NEUTROPHIL %: 0.1 % (ref 0–0.43)
LYMPHOCYTES ABSOLUTE: 1.4 K/CU MM
LYMPHOCYTES RELATIVE PERCENT: 19.4 % (ref 24–44)
MCH RBC QN AUTO: 31.3 PG (ref 27–31)
MCHC RBC AUTO-ENTMCNC: 29.1 % (ref 32–36)
MCV RBC AUTO: 107.8 FL (ref 78–100)
MONOCYTES ABSOLUTE: 0.7 K/CU MM
MONOCYTES RELATIVE PERCENT: 9.6 % (ref 0–4)
NEUTROPHILS ABSOLUTE: 4.5 K/CU MM
NEUTROPHILS RELATIVE PERCENT: 62.7 % (ref 36–66)
NUCLEATED RBC %: 0 %
PDW BLD-RTO: 16.1 % (ref 11.7–14.9)
PLATELET # BLD: 172 K/CU MM (ref 140–440)
PMV BLD AUTO: 11.2 FL (ref 7.5–11.1)
POTASSIUM SERPL-SCNC: 5.8 MMOL/L (ref 3.5–5.1)
RBC # BLD: 2.17 M/CU MM (ref 4.6–6.2)
SODIUM BLD-SCNC: 141 MMOL/L (ref 135–145)
TOTAL IMMATURE NEUTOROPHIL: 0.01 K/CU MM
TOTAL NUCLEATED RBC: 0 K/CU MM
WBC # BLD: 7.1 K/CU MM (ref 4–10.5)

## 2024-05-24 PROCEDURE — P9016 RBC LEUKOCYTES REDUCED: HCPCS

## 2024-05-24 PROCEDURE — 6370000000 HC RX 637 (ALT 250 FOR IP): Performed by: NURSE PRACTITIONER

## 2024-05-24 PROCEDURE — 97535 SELF CARE MNGMENT TRAINING: CPT

## 2024-05-24 PROCEDURE — 86850 RBC ANTIBODY SCREEN: CPT

## 2024-05-24 PROCEDURE — 2580000003 HC RX 258: Performed by: PHYSICAL MEDICINE & REHABILITATION

## 2024-05-24 PROCEDURE — 97530 THERAPEUTIC ACTIVITIES: CPT

## 2024-05-24 PROCEDURE — 6370000000 HC RX 637 (ALT 250 FOR IP): Performed by: PHYSICAL MEDICINE & REHABILITATION

## 2024-05-24 PROCEDURE — 80048 BASIC METABOLIC PNL TOTAL CA: CPT

## 2024-05-24 PROCEDURE — 99232 SBSQ HOSP IP/OBS MODERATE 35: CPT | Performed by: PHYSICAL MEDICINE & REHABILITATION

## 2024-05-24 PROCEDURE — 82962 GLUCOSE BLOOD TEST: CPT

## 2024-05-24 PROCEDURE — 85025 COMPLETE CBC W/AUTO DIFF WBC: CPT

## 2024-05-24 PROCEDURE — 6370000000 HC RX 637 (ALT 250 FOR IP): Performed by: INTERNAL MEDICINE

## 2024-05-24 PROCEDURE — 86922 COMPATIBILITY TEST ANTIGLOB: CPT

## 2024-05-24 PROCEDURE — 30233N1 TRANSFUSION OF NONAUTOLOGOUS RED BLOOD CELLS INTO PERIPHERAL VEIN, PERCUTANEOUS APPROACH: ICD-10-PCS | Performed by: INTERNAL MEDICINE

## 2024-05-24 PROCEDURE — 97110 THERAPEUTIC EXERCISES: CPT

## 2024-05-24 PROCEDURE — 86900 BLOOD TYPING SEROLOGIC ABO: CPT

## 2024-05-24 PROCEDURE — 86901 BLOOD TYPING SEROLOGIC RH(D): CPT

## 2024-05-24 PROCEDURE — 94761 N-INVAS EAR/PLS OXIMETRY MLT: CPT

## 2024-05-24 PROCEDURE — 6360000002 HC RX W HCPCS: Performed by: PHYSICIAN ASSISTANT

## 2024-05-24 PROCEDURE — 97116 GAIT TRAINING THERAPY: CPT

## 2024-05-24 PROCEDURE — 85014 HEMATOCRIT: CPT

## 2024-05-24 PROCEDURE — 6370000000 HC RX 637 (ALT 250 FOR IP): Performed by: PHYSICIAN ASSISTANT

## 2024-05-24 PROCEDURE — 36415 COLL VENOUS BLD VENIPUNCTURE: CPT

## 2024-05-24 PROCEDURE — 85018 HEMOGLOBIN: CPT

## 2024-05-24 PROCEDURE — 1280000000 HC REHAB R&B

## 2024-05-24 PROCEDURE — 36430 TRANSFUSION BLD/BLD COMPNT: CPT

## 2024-05-24 PROCEDURE — 2580000003 HC RX 258: Performed by: PHYSICIAN ASSISTANT

## 2024-05-24 RX ORDER — SODIUM POLYSTYRENE SULFONATE 15 G/60ML
15 SUSPENSION ORAL; RECTAL ONCE
Status: COMPLETED | OUTPATIENT
Start: 2024-05-24 | End: 2024-05-24

## 2024-05-24 RX ORDER — FUROSEMIDE 20 MG/1
20 TABLET ORAL ONCE
Status: COMPLETED | OUTPATIENT
Start: 2024-05-24 | End: 2024-05-25

## 2024-05-24 RX ORDER — SODIUM CHLORIDE 9 MG/ML
INJECTION, SOLUTION INTRAVENOUS PRN
Status: DISCONTINUED | OUTPATIENT
Start: 2024-05-24 | End: 2024-05-25

## 2024-05-24 RX ADMIN — CARVEDILOL 25 MG: 25 TABLET, FILM COATED ORAL at 20:49

## 2024-05-24 RX ADMIN — LEVOTHYROXINE SODIUM 75 MCG: 0.07 TABLET ORAL at 05:54

## 2024-05-24 RX ADMIN — MEROPENEM 500 MG: 500 INJECTION, POWDER, FOR SOLUTION INTRAVENOUS at 03:57

## 2024-05-24 RX ADMIN — SODIUM POLYSTYRENE SULFONATE 15 G: 15 SUSPENSION ORAL; RECTAL at 20:50

## 2024-05-24 RX ADMIN — ACETAMINOPHEN 650 MG: 325 TABLET ORAL at 05:54

## 2024-05-24 RX ADMIN — PANTOPRAZOLE SODIUM 40 MG: 40 TABLET, DELAYED RELEASE ORAL at 05:54

## 2024-05-24 RX ADMIN — ATORVASTATIN CALCIUM 20 MG: 10 TABLET, FILM COATED ORAL at 20:49

## 2024-05-24 RX ADMIN — FERROUS SULFATE TAB 325 MG (65 MG ELEMENTAL FE) 325 MG: 325 (65 FE) TAB at 12:57

## 2024-05-24 RX ADMIN — CARVEDILOL 25 MG: 25 TABLET, FILM COATED ORAL at 10:41

## 2024-05-24 RX ADMIN — ASPIRIN 81 MG: 81 TABLET, CHEWABLE ORAL at 20:49

## 2024-05-24 RX ADMIN — ENOXAPARIN SODIUM 30 MG: 100 INJECTION SUBCUTANEOUS at 10:42

## 2024-05-24 RX ADMIN — INSULIN LISPRO 5 UNITS: 100 INJECTION, SOLUTION INTRAVENOUS; SUBCUTANEOUS at 10:42

## 2024-05-24 RX ADMIN — AMLODIPINE BESYLATE 10 MG: 10 TABLET ORAL at 10:41

## 2024-05-24 RX ADMIN — SODIUM CHLORIDE, PRESERVATIVE FREE 10 ML: 5 INJECTION INTRAVENOUS at 20:48

## 2024-05-24 RX ADMIN — INSULIN LISPRO 5 UNITS: 100 INJECTION, SOLUTION INTRAVENOUS; SUBCUTANEOUS at 12:53

## 2024-05-24 RX ADMIN — INSULIN GLARGINE 5 UNITS: 100 INJECTION, SOLUTION SUBCUTANEOUS at 20:49

## 2024-05-24 RX ADMIN — INSULIN LISPRO 2 UNITS: 100 INJECTION, SOLUTION INTRAVENOUS; SUBCUTANEOUS at 12:54

## 2024-05-24 RX ADMIN — SODIUM CHLORIDE, PRESERVATIVE FREE 10 ML: 5 INJECTION INTRAVENOUS at 12:55

## 2024-05-24 RX ADMIN — MEROPENEM 500 MG: 500 INJECTION, POWDER, FOR SOLUTION INTRAVENOUS at 16:28

## 2024-05-24 ASSESSMENT — PAIN DESCRIPTION - ORIENTATION: ORIENTATION: LEFT;RIGHT

## 2024-05-24 ASSESSMENT — PAIN SCALES - GENERAL
PAINLEVEL_OUTOF10: 2
PAINLEVEL_OUTOF10: 3

## 2024-05-24 ASSESSMENT — PAIN DESCRIPTION - DESCRIPTORS: DESCRIPTORS: ACHING

## 2024-05-24 ASSESSMENT — PAIN DESCRIPTION - LOCATION: LOCATION: FOOT

## 2024-05-24 ASSESSMENT — PAIN DESCRIPTION - PAIN TYPE: TYPE: CHRONIC PAIN

## 2024-05-24 ASSESSMENT — PAIN DESCRIPTION - FREQUENCY: FREQUENCY: INTERMITTENT

## 2024-05-24 ASSESSMENT — PAIN - FUNCTIONAL ASSESSMENT: PAIN_FUNCTIONAL_ASSESSMENT: PREVENTS OR INTERFERES SOME ACTIVE ACTIVITIES AND ADLS

## 2024-05-24 ASSESSMENT — PAIN DESCRIPTION - ONSET: ONSET: ON-GOING

## 2024-05-24 NOTE — PROGRESS NOTES
V2.0  [facility] [place]      Name:  Greg Easton /Age/Sex: 1940  (83 y.o. male)   MRN & CSN:  [id number] & [id number] Encounter Date/Time: 2024 9:16 AM EDT    Location:  [address] [place] [address] PCP: Jacobo Zazueta MD       Hospital Day: 7    Assessment and Plan:   Greg Easton is a 83 y.o. male with pmh as noted below seeing for medical management in ARU.       Plan:  Chronic low pack pain with peripheral neuropathy  - MRI L-spine with multilevel lumbar spondylosis.   - Currently in IPR.   - Continue therapy.      Catheter Assoc. UTI with ESBL E coli  Bladder Spasms  - Continue IV Merrem, ending on  per ID.   - Suprapubic cath exchange and cystoscopy and ureteral stent exchange on  and   -Urology Consulted: Jon Cherry PA-C, consider trial of Myrbetriq or Gemtesa as outpatient, will discuss with Dr. Mckeon, CHAZ with him in 1-2 weeks for re-evaluation of SPT exchange      T2DM with hyperglycemia  - Last A1c 8.0% in 2024.  - Continue Insulin Lantus  qhs and Lispro TIDWM with LCSI.  -glucose dropped  evening to 46, 55, stable this AM, and last evening. Dr. Garcia is following and treating DM     Essential hypertension  - Continue Norvasc and Coreg.  -BP consistently elevated,  Was increased to Norvasc 10 mg on -- Bp trend is improving     Acquired hypothyroidism  -previous  TSH normal   - Continue Synthroid.     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      Diet ADULT DIET; Regular; 5 carb choices (75 gm/meal); Double Protein Portions  ADULT ORAL NUTRITION SUPPLEMENT; Breakfast, Lunch, Dinner; Diabetic Oral Supplement   DVT Prophylaxis [] Lovenox, []  Heparin, [] SCDs, [] Ambulation,  [] Eliquis, [] Xarelto  [] Coumadin   Code Status Full Code   Disposition From: Home  Expected Disposition: TBD  Estimated Date of Discharge: TBD  Patient requires continued admission due to Patient is in ARU   Surrogate Decision Maker/ POA Jon-

## 2024-05-24 NOTE — FLOWSHEET NOTE
[x] daily progress note       [] discharge       Patient Name:  Greg Easton   :  1940 MRN: [id number]  Room:  [address] [place] [address] Date of Admission: 2024  Rehabilitation Diagnosis:   Syncope and collapse [R55]  Debility [R53.81]       Date 2024       Day of ARU Week:  7   Time IN/OUT 5007-6672   Individual Tx Minutes 60   TOTAL Tx Time Mins 60   Restrictions Restrictions/Precautions  Restrictions/Precautions: Fall Risk, Contact Precautions  Position Activity Restriction  Other position/activity restrictions: IV access RUE, suprapubic catheter   Communication with other providers: [x]   OK to see per nursing:     []   Spoke with team member regarding:      Subjective observations and cognitive status: Pt seen sitting up in recliner at beginning of treatment. Agreeable to therapy.    Pain level/location:   0 /10           Discharge recommendations   Anticipated discharge date:  TBD  Destination: []home alone   []home alone with assist PRN     [x] home w/ family      [] Continuous supervision  []SNF    [] Assisted living     [] Other:  Continued therapy: []C PT  []OUTPATIENT PT   [] No Further PT  []SNF PT  Caregiver training recommended: []Yes  [] No   Equipment needs: has 2WW and rollator      Bed Mobility:           [x]   Pt received out of bed   Rolling R/L:  Independent   Scooting:  Independent   Lying --> Sit:  Independent   Sit --> lying:  Independent  Pt practiced in regular, flat bed without rails     Transfers:    Sit--> Stand:  mod I   Stand --> Sit:   mod I   Chair-->Bed/Bed --> Chair:   mod I   Assistive device required for transfer:   RW    Gait:    Distance:  275'   Assistance:  supervision   Device:  RW  Gait Quality:  reciprocal pattern    Additional Therapeutic activities/exercises completed this date:     []   Nu-step:  Time:        Level:         #Steps:       []   Rebounder:    []  Seated     []  Standing        [x]   Balance training: pt stood at toilet emptied out catheter leg bag

## 2024-05-24 NOTE — PROGRESS NOTES
Physical Rehabilitation: OCCUPATIONAL THERAPY     [x] daily progress note       [] discharge       Patient Name:  Greg Easton   :  1940 MRN: [id number]  Room:  [address] [place] [address] Date of Admission: 2024  Rehabilitation Diagnosis:   Syncope and collapse [R55]  Debility [R53.81]       Date 2024       Day of ARU Week:  7   Time IN//825; 1100/1200   Individual Tx Minutes 60 + 60   Group Tx Minutes    Co-Treat Minutes    Concurrent Tx Minutes    TOTAL Tx Time Mins 120   Variance Time    Variance Reason []   Refusal due to:     []   Medical hold/reason:    []   Illness   []   Off Unit for test/procedure  []   Extra time needed to complete task  []   Therapeutic need  []   Make up mins were attempted but pt unable to complete due to (specify)  []   Other (specify):   Restrictions Restrictions/Precautions: Fall Risk, Contact Precautions         Communication with other providers: [x]   OK to see per nursing:     []   Spoke with team member regarding:      Subjective observations and cognitive status: Patient in bed with light on upon approach, requiring bathroom urgently c stomach cramps, patient is pleasant and agreeable to therapy      Pain level/location:    /10       Location: per patient aches and pains does not quantify    Discharge recommendations  Anticipated discharge date:    Destination: []home alone   []home alone w assist prn   [x] home w/ family    [] Continuous supervision       []SNF    [] Assisted living     [] Other:   Continued therapy: [x]HHC OT  []OUTPATIENT  OT   [] No Further OT  Equipment needs: None        ADLs:    Eating: Eating  Assistance Needed: Independent  Comment: X  CARE Score: 6  Discharge Goal: Independent       Oral Hygiene: Oral Hygiene  Assistance Needed: Independent  Comment: X  CARE Score: 6  Discharge Goal: Independent    UB/LB Bathing: Shower/Bathe Self  Assistance Needed: Independent  Comment: X  CARE Score: 6  Discharge Goal: Independent    UB Dressing:

## 2024-05-24 NOTE — PROGRESS NOTES
05/24/24 0914   Encounter Summary   Encounter Overview/Reason Spiritual/Emotional Needs   Service Provided For Patient   Referral/Consult From [facility]   Support System Children   Last Encounter  05/24/24  (Offered prayer and visitation. patient belong to [employer] and his  visited him. Patient hopes to be discharged for home.)   Complexity of Encounter Low   Begin Time 0855   End Time  0917   Total Time Calculated 22 min   Spiritual/Emotional needs   Type Spiritual Support   Grief, Loss, and Adjustments   Type Adjustment to illness;Life Adjustments   Assessment/Intervention/Outcome   Assessment Hopeful;Coping   Intervention Active listening;Discussed belief system/Jewish practices/ern[place];Discussed illness injury and it’s impact;Discussed relationship with God;Empowerment;Life review/Legacy;Nurtured Hope;Prayer (assurance of)/[place];Sustaining Presence/Ministry of presence   Outcome Engaged in conversation;Encouraged;Expressed Gratitude;New perspective/awareness   Plan and Referrals   Plan/Referrals Continue Support (comment)  (as needed)

## 2024-05-24 NOTE — PROGRESS NOTES
Progress Note( Dr. Garcia)    Subjective:   Admit Date: 5/18/2024  PCP: Jacobo Zazueta MD    Admitted For :      Consulted For:  General debility UTI and orthostatic hypotension  Patient admitted to hospital initially on medical floor on May 13, 2024 and was discharged and transferred to rehab unit on May 19, 2024    Interval History: Better control of blood glucose patient had an episode of hypoglycemia yesterday       Denies any chest pains,   Denies SOB .   Denies nausea or vomiting.   No new bowel or bladder symptoms.       Intake/Output Summary (Last 24 hours) at 5/24/2024 0824  Last data filed at 5/24/2024 0446  Gross per 24 hour   Intake 868 ml   Output 1300 ml   Net -432 ml         DATA    CBC: No results for input(s): \"WBC\", \"HGB\", \"PLT\" in the last 72 hours.   CMP:  No results for input(s): \"NA\", \"K\", \"CL\", \"CO2\", \"BUN\", \"CREATININE\", \"GLU\", \"CALCIUM\", \"PROT\", \"LABALBU\", \"BILITOT\", \"ALKPHOS\", \"AST\", \"ALT\" in the last 72 hours.    Lipids:   Lab Results   Component Value Date/Time    CHOL 171 05/30/2023 06:48 AM    HDL 43 05/30/2023 06:48 AM    TRIG 129 05/30/2023 06:48 AM     Glucose:  Recent Labs     05/23/24 2011 05/23/24  2140 05/24/24  0228   POCGLU 73 117* 173*       [id number]:  Lab Results   Component Value Date/Time    LABA1C 8.0 05/12/2024 08:26 AM     High Sensitivity TSH:   Lab Results   Component Value Date/Time    TSHHS 3.070 02/02/2024 02:55 AM     Free T3: No results found for: \"FT3\"  Free T4:  Lab Results   Component Value Date/Time    T4FREE 1.55 05/24/2023 01:26 AM       No orders to display        Scheduled Medicines   Medications:    insulin glargine  5 Units SubCUTAneous Nightly    amLODIPine  10 mg Oral Daily    insulin lispro  5 Units SubCUTAneous TID WC    sodium chloride flush  5-40 mL IntraVENous BID    insulin lispro  0-4 Units SubCUTAneous 2 times per day    enoxaparin  30 mg SubCUTAneous Daily    aspirin  81 mg Oral Nightly    atorvastatin  20 mg Oral Nightly     cancer (HCC)     Suprapubic catheter (HCC)     Sepsis due to urinary tract infection (HCC)     Coagulase negative Staphylococcus bacteremia     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)     Acute metabolic encephalopathy     SVT (supraventricular tachycardia) (HCC)     Acute metabolic encephalopathy due to hypoglycemia     History of prostate cancer     Cigarette nicotine dependence without complication     Altered mental status     Serratia marcescens infection     Hypoglycemia     Fungemia     Pyelonephritis     Bacteremia due to Enterococcus     Chronic combined systolic (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 Labs and X ray results   Reviewed Current Medicines   On meal/ Correction bolus Humalog/ Basal Lantus Insulin regime /    Monitor Blood glucose frequently   Modified  the dose of Insulin/ other medicines as needed   Will follow     .     JUANCHO Garcia MD, MD

## 2024-05-24 NOTE — PROGRESS NOTES
Comprehensive Nutrition Assessment    Type and Reason for Visit:  Reassess    Nutrition Recommendations/Plan:   Continue carb controlled diet, higher calorie plan   Will continue to offer diabetic oral nutrition supplement with meals   Will continue to follow up during stay      Malnutrition Assessment:  Malnutrition Status:  Insufficient data (05/20/24 1609)    Context:  Acute Illness       Nutrition Assessment:    Meal intake remains % at most meals. Remains on carb controlled diet, extra protein as neede/requested. Reasonable meal orders. Continue to receive diabetic oral nutrition supplements with meals. Prefers to drink supplements between meals. Continues to improve intake. Will continue to follow at low nutrition risk at this time.    Nutrition Related Findings:    up in chair for meal,  some glucerna in room but likes to save for between meals.   endocrinology following with hypoglycemic event Wound Type: None       Current Nutrition Intake & Therapies:    Average Meal Intake: %  Average Supplements Intake: %  ADULT DIET; Regular; 5 carb choices (75 gm/meal); Double Protein Portions  ADULT ORAL NUTRITION SUPPLEMENT; Breakfast, Lunch, Dinner; Diabetic Oral Supplement    Anthropometric Measures:  Height: 172.7 cm (5' 7.99\")  Ideal Body Weight (IBW): 154 lbs (70 kg)    Admission Body Weight: 81.2 kg (179 lb 0.2 oz) (5/17/24)  Current Body Weight: 83 kg (182 lb 15.7 oz), 117.2 % IBW. Weight Source: Bed Scale  Current BMI (kg/m2): 27.8  Usual Body Weight: 83.9 kg (185 lb) (3/2024)  % Weight Change (Calculated): -2.4  Weight Adjustment For: No Adjustment                 BMI Categories: Overweight (BMI 25.0-29.9)    Estimated Daily Nutrient Needs:  Energy Requirements Based On: Kcal/kg  Weight Used for Energy Requirements: Current  Energy (kcal/day): 7835-7248 (22-27 darrin/kg)  Weight Used for Protein Requirements: Ideal  Protein (g/day): 70-84 (1-1.2 g/kg)  Method Used for Fluid Requirements: 1

## 2024-05-24 NOTE — PROGRESS NOTES
Physical Medicine and Rehabilitation Progress Note    Name:  Greg Easton Date/Time of Admission: 2024  4:43 PM    CSN: [id number] Attending Provider: Manuel Floyd MD   Room/Bed:  1007/1007-A : 1940 Age: 83 y.o.   Date: 2024 Time: 10:08 AM       IMPRESSION  Greg Easton is a 83 y.o. male with debility after hospital stay for syncope.  He was found to have ESBL E. coli catheter associated UTI (chronic suprapubic catheter).  He was also treated for orthostatic hypotension.  Of note, he has a history of recent hospital stay for hyperglycemia, hyperkalemia where he underwent suprapubic catheter exchange and left ureteral stent exchange.     Strengths for the patient: Motivated to fully participate in inpatient rehab program to regain functional independence for discharge home.  Has good knowledge of using adaptive equipment and is accepting of its necessity.  Reports good recent premorbid function.     Limitations/barriers for the patient: Peripheral neuropathy impaired gait and balance at baseline which has been exacerbated after recent hospital stay.  Orthostatic issues may limit participation in rehab and will need to be monitored closely.  Patient suprapubic catheter puts him at risk for recurrent UTIs and setbacks in function.     Comorbid Conditions:  History of prostate cancer status post RPP   History of ureteral stent  Chronic urinary retention status post suprapubic catheter  Chronic cystitis  Chronic left hydronephrosis  Chronic low back pain  Multilevel lumbar spondylosis with stenosis  Chronic kidney disease stage IV  Diabetes mellitus with hyperglycemia and associated peripheral polyneuropathy  Hypertension  Anemia of chronic disease  Moderate protein calorie malnutrition        PLAN  Functional Impairments -Acute inpatient rehabilitation to proceed with PT/ minutes 5 out of every 7 days. Work on gait, transfers, ADLs, iADLs, safety awareness, equipment management,

## 2024-05-24 NOTE — PROGRESS NOTES
Progress Note( Dr. Garcia)  5/24/2024  Subjective:   Admit Date: 5/18/2024  PCP: Jacobo Zazueta MD    Admitted For :      Consulted For:  General debility UTI and orthostatic hypotension  Patient admitted to hospital initially on medical floor on May 13, 2024 and was discharged and transferred to rehab unit on May 19, 2024    Interval History: Better control of blood glucose patient had an episode of hypoglycemia yesterday       Denies any chest pains,   Denies SOB .   Denies nausea or vomiting.   No new bowel or bladder symptoms.       Intake/Output Summary (Last 24 hours) at 5/24/2024 0824  Last data filed at 5/24/2024 0446  Gross per 24 hour   Intake 868 ml   Output 1300 ml   Net -432 ml         DATA    CBC: No results for input(s): \"WBC\", \"HGB\", \"PLT\" in the last 72 hours.   CMP:  No results for input(s): \"NA\", \"K\", \"CL\", \"CO2\", \"BUN\", \"CREATININE\", \"GLU\", \"CALCIUM\", \"PROT\", \"LABALBU\", \"BILITOT\", \"ALKPHOS\", \"AST\", \"ALT\" in the last 72 hours.    Lipids:   Lab Results   Component Value Date/Time    CHOL 171 05/30/2023 06:48 AM    HDL 43 05/30/2023 06:48 AM    TRIG 129 05/30/2023 06:48 AM     Glucose:  Recent Labs     05/23/24 2011 05/23/24  2140 05/24/24  0228   POCGLU 73 117* 173*       [id number]:  Lab Results   Component Value Date/Time    LABA1C 8.0 05/12/2024 08:26 AM     High Sensitivity TSH:   Lab Results   Component Value Date/Time    TSHHS 3.070 02/02/2024 02:55 AM     Free T3: No results found for: \"FT3\"  Free T4:  Lab Results   Component Value Date/Time    T4FREE 1.55 05/24/2023 01:26 AM       No orders to display        Scheduled Medicines   Medications:    insulin glargine  5 Units SubCUTAneous Nightly    amLODIPine  10 mg Oral Daily    insulin lispro  5 Units SubCUTAneous TID WC    sodium chloride flush  5-40 mL IntraVENous BID    insulin lispro  0-4 Units SubCUTAneous 2 times per day    enoxaparin  30 mg SubCUTAneous Daily    aspirin  81 mg Oral Nightly    atorvastatin  20 mg Oral Nightly

## 2024-05-25 LAB
ABO/RH: NORMAL
ANION GAP SERPL CALCULATED.3IONS-SCNC: 10 MMOL/L (ref 7–16)
ANTIBODY SCREEN: NEGATIVE
BASOPHILS ABSOLUTE: 0 K/CU MM
BASOPHILS RELATIVE PERCENT: 0.4 % (ref 0–1)
BUN SERPL-MCNC: 52 MG/DL (ref 6–23)
CALCIUM SERPL-MCNC: 7.8 MG/DL (ref 8.3–10.6)
CHLORIDE BLD-SCNC: 112 MMOL/L (ref 99–110)
CO2: 19 MMOL/L (ref 21–32)
COMPONENT: NORMAL
CREAT SERPL-MCNC: 2.2 MG/DL (ref 0.9–1.3)
CROSSMATCH RESULT: NORMAL
DIFFERENTIAL TYPE: ABNORMAL
EOSINOPHILS ABSOLUTE: 0.5 K/CU MM
EOSINOPHILS RELATIVE PERCENT: 7.4 % (ref 0–3)
GFR, ESTIMATED: 29 ML/MIN/1.73M2
GLUCOSE BLD-MCNC: 131 MG/DL (ref 70–99)
GLUCOSE BLD-MCNC: 137 MG/DL (ref 70–99)
GLUCOSE BLD-MCNC: 167 MG/DL (ref 70–99)
GLUCOSE BLD-MCNC: 169 MG/DL (ref 70–99)
GLUCOSE BLD-MCNC: 211 MG/DL (ref 70–99)
GLUCOSE SERPL-MCNC: 159 MG/DL (ref 70–99)
HCT VFR BLD CALC: 26.7 % (ref 42–52)
HEMOGLOBIN: 8.3 GM/DL (ref 13.5–18)
IMMATURE NEUTROPHIL %: 0.3 % (ref 0–0.43)
LYMPHOCYTES ABSOLUTE: 1.5 K/CU MM
LYMPHOCYTES RELATIVE PERCENT: 21.5 % (ref 24–44)
MCH RBC QN AUTO: 30.4 PG (ref 27–31)
MCHC RBC AUTO-ENTMCNC: 31.1 % (ref 32–36)
MCV RBC AUTO: 97.8 FL (ref 78–100)
MONOCYTES ABSOLUTE: 0.7 K/CU MM
MONOCYTES RELATIVE PERCENT: 9.8 % (ref 0–4)
NEUTROPHILS ABSOLUTE: 4.3 K/CU MM
NEUTROPHILS RELATIVE PERCENT: 60.6 % (ref 36–66)
NUCLEATED RBC %: 0 %
PDW BLD-RTO: 16.3 % (ref 11.7–14.9)
PLATELET # BLD: 177 K/CU MM (ref 140–440)
PMV BLD AUTO: 11 FL (ref 7.5–11.1)
POTASSIUM SERPL-SCNC: 5.7 MMOL/L (ref 3.5–5.1)
RBC # BLD: 2.73 M/CU MM (ref 4.6–6.2)
SODIUM BLD-SCNC: 141 MMOL/L (ref 135–145)
STATUS: NORMAL
TOTAL IMMATURE NEUTOROPHIL: 0.02 K/CU MM
TOTAL NUCLEATED RBC: 0 K/CU MM
TRANSFUSION STATUS: NORMAL
UNIT DIVISION: 0
UNIT NUMBER: NORMAL
WBC # BLD: 7.2 K/CU MM (ref 4–10.5)

## 2024-05-25 PROCEDURE — 6370000000 HC RX 637 (ALT 250 FOR IP): Performed by: NURSE PRACTITIONER

## 2024-05-25 PROCEDURE — 1280000000 HC REHAB R&B

## 2024-05-25 PROCEDURE — 05HA33Z INSERTION OF INFUSION DEVICE INTO LEFT BRACHIAL VEIN, PERCUTANEOUS APPROACH: ICD-10-PCS | Performed by: INTERNAL MEDICINE

## 2024-05-25 PROCEDURE — 36410 VNPNXR 3YR/> PHY/QHP DX/THER: CPT

## 2024-05-25 PROCEDURE — 2580000003 HC RX 258: Performed by: PHYSICAL MEDICINE & REHABILITATION

## 2024-05-25 PROCEDURE — 76937 US GUIDE VASCULAR ACCESS: CPT

## 2024-05-25 PROCEDURE — 2580000003 HC RX 258: Performed by: PHYSICIAN ASSISTANT

## 2024-05-25 PROCEDURE — 6370000000 HC RX 637 (ALT 250 FOR IP): Performed by: INTERNAL MEDICINE

## 2024-05-25 PROCEDURE — 6370000000 HC RX 637 (ALT 250 FOR IP): Performed by: PHYSICIAN ASSISTANT

## 2024-05-25 PROCEDURE — 85018 HEMOGLOBIN: CPT

## 2024-05-25 PROCEDURE — 36415 COLL VENOUS BLD VENIPUNCTURE: CPT

## 2024-05-25 PROCEDURE — 82962 GLUCOSE BLOOD TEST: CPT

## 2024-05-25 PROCEDURE — 85025 COMPLETE CBC W/AUTO DIFF WBC: CPT

## 2024-05-25 PROCEDURE — 6370000000 HC RX 637 (ALT 250 FOR IP): Performed by: PHYSICAL MEDICINE & REHABILITATION

## 2024-05-25 PROCEDURE — 94761 N-INVAS EAR/PLS OXIMETRY MLT: CPT

## 2024-05-25 PROCEDURE — 6360000002 HC RX W HCPCS: Performed by: PHYSICIAN ASSISTANT

## 2024-05-25 PROCEDURE — 85014 HEMATOCRIT: CPT

## 2024-05-25 PROCEDURE — 80048 BASIC METABOLIC PNL TOTAL CA: CPT

## 2024-05-25 RX ORDER — DIPHENHYDRAMINE HYDROCHLORIDE, ZINC ACETATE 2; .1 G/100G; G/100G
CREAM TOPICAL 3 TIMES DAILY PRN
Status: DISCONTINUED | OUTPATIENT
Start: 2024-05-25 | End: 2024-05-30 | Stop reason: HOSPADM

## 2024-05-25 RX ORDER — SODIUM POLYSTYRENE SULFONATE 15 G/60ML
15 SUSPENSION ORAL; RECTAL ONCE
Status: COMPLETED | OUTPATIENT
Start: 2024-05-25 | End: 2024-05-25

## 2024-05-25 RX ADMIN — MEROPENEM 500 MG: 500 INJECTION, POWDER, FOR SOLUTION INTRAVENOUS at 04:09

## 2024-05-25 RX ADMIN — SENNOSIDES 17.2 MG: 8.6 TABLET, FILM COATED ORAL at 09:12

## 2024-05-25 RX ADMIN — CARVEDILOL 25 MG: 25 TABLET, FILM COATED ORAL at 20:57

## 2024-05-25 RX ADMIN — ATORVASTATIN CALCIUM 20 MG: 10 TABLET, FILM COATED ORAL at 20:57

## 2024-05-25 RX ADMIN — SODIUM POLYSTYRENE SULFONATE 15 G: 15 SUSPENSION ORAL; RECTAL at 06:37

## 2024-05-25 RX ADMIN — SODIUM CHLORIDE, PRESERVATIVE FREE 10 ML: 5 INJECTION INTRAVENOUS at 09:12

## 2024-05-25 RX ADMIN — FERROUS SULFATE TAB 325 MG (65 MG ELEMENTAL FE) 325 MG: 325 (65 FE) TAB at 09:12

## 2024-05-25 RX ADMIN — ASPIRIN 81 MG: 81 TABLET, CHEWABLE ORAL at 20:57

## 2024-05-25 RX ADMIN — FUROSEMIDE 20 MG: 20 TABLET ORAL at 03:51

## 2024-05-25 RX ADMIN — PANTOPRAZOLE SODIUM 40 MG: 40 TABLET, DELAYED RELEASE ORAL at 05:14

## 2024-05-25 RX ADMIN — DIPHENHYDRAMINE HYDROCHLORIDE, ZINC ACETATE: 2; .1 CREAM TOPICAL at 21:04

## 2024-05-25 RX ADMIN — SODIUM ZIRCONIUM CYCLOSILICATE 10 G: 10 POWDER, FOR SUSPENSION ORAL at 20:57

## 2024-05-25 RX ADMIN — INSULIN LISPRO 1 UNITS: 100 INJECTION, SOLUTION INTRAVENOUS; SUBCUTANEOUS at 12:33

## 2024-05-25 RX ADMIN — CARVEDILOL 25 MG: 25 TABLET, FILM COATED ORAL at 09:12

## 2024-05-25 RX ADMIN — SODIUM CHLORIDE 25 ML: 9 INJECTION, SOLUTION INTRAVENOUS at 04:08

## 2024-05-25 RX ADMIN — SODIUM CHLORIDE, PRESERVATIVE FREE 10 ML: 5 INJECTION INTRAVENOUS at 20:58

## 2024-05-25 RX ADMIN — AMLODIPINE BESYLATE 10 MG: 10 TABLET ORAL at 09:12

## 2024-05-25 RX ADMIN — ENOXAPARIN SODIUM 30 MG: 100 INJECTION SUBCUTANEOUS at 09:12

## 2024-05-25 RX ADMIN — INSULIN LISPRO 5 UNITS: 100 INJECTION, SOLUTION INTRAVENOUS; SUBCUTANEOUS at 12:34

## 2024-05-25 RX ADMIN — MEROPENEM 500 MG: 500 INJECTION, POWDER, FOR SOLUTION INTRAVENOUS at 16:05

## 2024-05-25 RX ADMIN — LEVOTHYROXINE SODIUM 75 MCG: 0.07 TABLET ORAL at 05:14

## 2024-05-25 RX ADMIN — SODIUM ZIRCONIUM CYCLOSILICATE 10 G: 10 POWDER, FOR SUSPENSION ORAL at 13:36

## 2024-05-25 ASSESSMENT — PAIN DESCRIPTION - LOCATION: LOCATION: ARM

## 2024-05-25 ASSESSMENT — PAIN SCALES - GENERAL
PAINLEVEL_OUTOF10: 6
PAINLEVEL_OUTOF10: 0
PAINLEVEL_OUTOF10: 2

## 2024-05-25 ASSESSMENT — PAIN DESCRIPTION - PAIN TYPE: TYPE: ACUTE PAIN

## 2024-05-25 ASSESSMENT — PAIN DESCRIPTION - FREQUENCY: FREQUENCY: INTERMITTENT

## 2024-05-25 ASSESSMENT — PAIN DESCRIPTION - ORIENTATION: ORIENTATION: RIGHT

## 2024-05-25 ASSESSMENT — PAIN DESCRIPTION - DESCRIPTORS: DESCRIPTORS: NAGGING

## 2024-05-25 NOTE — PROGRESS NOTES
V2.0  [facility] Hospitalist Progress Note      Name:  Greg Easton /Age/Sex: 1940  (83 y.o. male)   MRN & CSN:  [id number] & [id number] Encounter Date/Time: 2024 10:34 AM EDT    Location:  [facility] PCP: Jacobo Zazueta MD       Hospital Day: 8    Assessment and Plan:   Greg Easton is a 83 y.o. male with pmh of chronic lower back pain with peripheral neuropathy, type 2 diabetes, hypertension, acquired hypothyroidism, CKD 3B/4 and hyperlipidemia, chronic suprapubic catheter who was originally admitted to hospital from May 14 for syncope and collapse due to orthostatic hypotension.  Patient was found ESBL positive in urine culture.  Patient is \on meropenem for 14 days (last dose on May 30) patient is discharged to ARU on May 18th for physical dibility.  Internal medicine was consulted for medical management on May 19th.     Plan:  Chronic low pack pain with peripheral neuropathy  - MRI L-spine with multilevel lumbar spondylosis.   - Currently in IPR.   - Continue therapy.      Catheter Assoc. UTI with ESBL E coli  Bladder Spasms  - Continue IV Merrem, ending on  per ID.   - Suprapubic cath exchange and cystoscopy and ureteral stent exchange on  and   -Urology Consulted: Jon Cherry PA-C, consider trial of Myrbetriq or Gemtesa as outpatient, will discuss with Dr. Mckeon, FU with him in 1-2 weeks for re-evaluation of SPT exchange      T2DM with hyperglycemia  - Last A1c 8.0% in 2024.  - Continue Insulin Lantus  qhs and Lispro TIDWM with LCSI.  -glucose dropped  evening to 46, 55, stable this AM, and last evening. Dr. Garcia is following and treating DM     Essential hypertension  - Continue Norvasc and Coreg.  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  Hyperkalemia  -Range 2.1-2.4  -- Potassium  Absolute 4.5 K/CU MM    Lymphocytes Absolute 1.4 K/CU MM    Monocytes Absolute 0.7 K/CU MM    Eosinophils Absolute 0.5 K/CU MM    Basophils Absolute 0.0 K/CU MM    Nucleated RBC % 0.0 %    Total Nucleated RBC 0.0 K/CU MM    Total Immature Neutrophil 0.01 K/CU MM    Immature Neutrophil % 0.1 0 - 0.43 %   POCT Glucose    Collection Time: 05/24/24  8:29 PM   Result Value Ref Range    POC Glucose 181 (H) 70 - 99 MG/DL   TYPE AND SCREEN    Collection Time: 05/24/24  9:02 PM   Result Value Ref Range    ABO/Rh O POSITIVE     Antibody Screen NEGATIVE     Unit Number [id number]     Component LEUKO-POOR RED CELLS     Unit Divison 00     Status ISSUED,FINAL     Transfusion Status OK TO TRANSFUSE     Crossmatch Result COMPATIBLE    POCT Glucose    Collection Time: 05/25/24  2:08 AM   Result Value Ref Range    POC Glucose 169 (H) 70 - 99 MG/DL   CBC with Auto Differential    Collection Time: 05/25/24  5:02 AM   Result Value Ref Range    WBC 7.2 4.0 - 10.5 K/CU MM    RBC 2.73 (L) 4.6 - 6.2 M/CU MM    Hemoglobin 8.3 (L) 13.5 - 18.0 GM/DL    Hematocrit 26.7 (L) 42 - 52 %    MCV 97.8 78 - 100 FL    MCH 30.4 27 - 31 PG    MCHC 31.1 (L) 32.0 - 36.0 %    RDW 16.3 (H) 11.7 - 14.9 %    Platelets 177 140 - 440 K/CU MM    MPV 11.0 7.5 - 11.1 FL    Differential Type AUTOMATED DIFFERENTIAL     Neutrophils % 60.6 36 - 66 %    Lymphocytes % 21.5 (L) 24 - 44 %    Monocytes % 9.8 (H) 0 - 4 %    Eosinophils % 7.4 (H) 0 - 3 %    Basophils % 0.4 0 - 1 %    Neutrophils Absolute 4.3 K/CU MM    Lymphocytes Absolute 1.5 K/CU MM    Monocytes Absolute 0.7 K/CU MM    Eosinophils Absolute 0.5 K/CU MM    Basophils Absolute 0.0 K/CU MM    Nucleated RBC % 0.0 %    Total Nucleated RBC 0.0 K/CU MM    Total Immature Neutrophil 0.02 K/CU MM    Immature Neutrophil % 0.3 0 - 0.43 %   Basic Metabolic Panel    Collection Time: 05/25/24  5:02 AM   Result Value Ref Range    Sodium 141 135 - 145 MMOL/L    Potassium 5.7 (HH) 3.5 - 5.1 MMOL/L    Chloride 112 (H) 99 -

## 2024-05-25 NOTE — CONSULTS
Consult completed. [employer] Safety® Deep Access 18g 2 ½\" IV catheter initiated into Brachial vein, x 3 attempt using ultrasound guided technique. Brisk blood return, flushes without resistance. Hard stick, x2 failed attempts to place in LFA. Patient had blood running when initial PIV infiltrated, needed a line ASAP. Patient tolerated well. Ricardo primary nurse notified.       Consult the Vascular Access Team for questions, concerns, or change in patient's condition.

## 2024-05-25 NOTE — CONSENT
Informed Consent for Blood Component Transfusion Note    I have discussed with the patient the rationale for blood component transfusion; its benefits in treating or preventing fatigue, organ damage, or death; and its risk which includes mild transfusion reactions, rare risk of blood borne infection, or more serious but rare reactions. I have discussed the alternatives to transfusion, including the risk and consequences of not receiving transfusion. The patient had an opportunity to ask questions and had agreed to proceed with transfusion of blood components.    Electronically signed by Manuel Floyd MD on 5/24/24 at 8:01 PM EDT

## 2024-05-25 NOTE — PROGRESS NOTES
management, patient/family teaching, pain management.  Caregiver training will be offered.   ESBL E. coli UTI-in the setting of chronic suprapubic catheter.  Followed by urology and infectious disease.  ID recommends IV meropenem for 14 days (may discharge home on IV ertapenem).  Chronic urinary retention-with history of chronic suprapubic catheter.  Suprapubic catheter exchanged per urology 5/13 with plans to exchange every 3 weeks going forward.  Patient with painful bladder spasms-pharmacy states B&O suppositories not available; urology consulted, appreciate assistance-noted consideration of Myrbetriq or Gemtesa as outpatient  Chronic left hydronephrosis-with left ureteral stent (exchanged 5/13 with plans to exchange every 3 months going forward.  Follow-up with urology  Orthostatic hypotension-with baseline history of hypertension.  Evaluated by cardiology.  Norvasc initially held however subsequently resumed at lower dose (5 mg daily) prior to inpatient rehab-now increased to 10 mg in inpatient rehab due to an blood pressure.  Monitor closely for symptomatic hypotension.  Treat underlying conditions as above.  Continue SHANNON hose when upright and out of bed.  Consider abdominal binder if symptomatic.  Encourage appropriate fluid intake.  Diabetes mellitus-with peripheral polyneuropathy.  Therapy as above for gait and balance training, adaptive equipment evaluations.  Endocrinology consulted for sugar management in IPR, appreciate assistance.  Recent hypoglycemia noted-medication adjustments made per endocrinology  Chronic kidney disease stage IV-with recent hyperkalemia eval weight by nephrology.  Monitor for recurrence.  He is status post Lokelma.  Chronic low back pain-MRI lumbar spine with multilevel spondylosis and stenosis.  Stenosis worse on left compared to right.  Patient with corresponding weakness left lower limb more than right lower limb which patient reports is chronic and unchanged.  Gradual  BLE to get in car; holding assist of trunk to get in and out of car; required use of 2WW and assist on suprapubic catheter line/bag during transfers  CARE Score: 2  Discharge Goal: Supervision or touching assistance    Ambulation:    Walking Ability  Does the Patient Walk?: Yes     Walk 10 Feet  Assistance Needed: Supervision or touching assistance  Comment: SBA with RW  CARE Score: 4  Discharge Goal: Independent     Walk 50 Feet with Two Turns  Assistance Needed: Supervision or touching assistance  Comment: SBA with RW  Reason if not Attempted: Not attempted due to medical condition or safety concerns  CARE Score: 4  Discharge Goal: Independent     Walk 150 Feet  Assistance Needed: Supervision or touching assistance  Comment: SBA with RW  Reason if not Attempted: Not attempted due to medical condition or safety concerns  CARE Score: 4  Discharge Goal: Supervision or touching assistance     Walking 10 Feet on Uneven Surfaces  Assistance Needed: Supervision or touching assistance  Comment: SBA with RW  Reason if not Attempted: Not attempted due to medical condition or safety concerns  CARE Score: 4  Discharge Goal: Independent     1 Step (Curb)  Assistance Needed: Supervision or touching assistance  Comment: SBA with RW (4\" curb step)  Reason if not Attempted: Not attempted due to medical condition or safety concerns  CARE Score: 4  Discharge Goal: Supervision or touching assistance     4 Steps  Comment: limited by fatigue  Reason if not Attempted: Not attempted due to medical condition or safety concerns  CARE Score: 88  Discharge Goal: Supervision or touching assistance     12 Steps  Comment: pt was not performing this at baseline; limited potential to progressing to this mobility task  Reason if not Attempted: Not applicable  CARE Score: 9  Discharge Goal: Not Applicable       Wheelchair:  w/c Ability: Wheelchair Ability  Uses a Wheelchair and/or Scooter?: No                Balance:        Object: Picking

## 2024-05-26 VITALS
OXYGEN SATURATION: 98 % | HEART RATE: 71 BPM | WEIGHT: 183.42 LBS | HEIGHT: 68 IN | BODY MASS INDEX: 27.8 KG/M2 | SYSTOLIC BLOOD PRESSURE: 156 MMHG | DIASTOLIC BLOOD PRESSURE: 61 MMHG | RESPIRATION RATE: 18 BRPM | TEMPERATURE: 97.5 F

## 2024-05-26 LAB
ANION GAP SERPL CALCULATED.3IONS-SCNC: 13 MMOL/L (ref 7–16)
BUN SERPL-MCNC: 45 MG/DL (ref 6–23)
CALCIUM SERPL-MCNC: 7.9 MG/DL (ref 8.3–10.6)
CHLORIDE BLD-SCNC: 110 MMOL/L (ref 99–110)
CO2: 19 MMOL/L (ref 21–32)
CREAT SERPL-MCNC: 2.1 MG/DL (ref 0.9–1.3)
GFR, ESTIMATED: 30 ML/MIN/1.73M2
GLUCOSE BLD-MCNC: 130 MG/DL (ref 70–99)
GLUCOSE BLD-MCNC: 137 MG/DL (ref 70–99)
GLUCOSE BLD-MCNC: 161 MG/DL (ref 70–99)
GLUCOSE BLD-MCNC: 186 MG/DL (ref 70–99)
GLUCOSE BLD-MCNC: 252 MG/DL (ref 70–99)
GLUCOSE SERPL-MCNC: 191 MG/DL (ref 70–99)
POTASSIUM SERPL-SCNC: 4.8 MMOL/L (ref 3.5–5.1)
SODIUM BLD-SCNC: 142 MMOL/L (ref 135–145)

## 2024-05-26 PROCEDURE — 97116 GAIT TRAINING THERAPY: CPT

## 2024-05-26 PROCEDURE — 82962 GLUCOSE BLOOD TEST: CPT

## 2024-05-26 PROCEDURE — 6360000002 HC RX W HCPCS: Performed by: PHYSICIAN ASSISTANT

## 2024-05-26 PROCEDURE — 97530 THERAPEUTIC ACTIVITIES: CPT

## 2024-05-26 PROCEDURE — 6370000000 HC RX 637 (ALT 250 FOR IP): Performed by: INTERNAL MEDICINE

## 2024-05-26 PROCEDURE — 6370000000 HC RX 637 (ALT 250 FOR IP): Performed by: NURSE PRACTITIONER

## 2024-05-26 PROCEDURE — 97535 SELF CARE MNGMENT TRAINING: CPT

## 2024-05-26 PROCEDURE — 94761 N-INVAS EAR/PLS OXIMETRY MLT: CPT

## 2024-05-26 PROCEDURE — 99233 SBSQ HOSP IP/OBS HIGH 50: CPT | Performed by: PHYSICAL MEDICINE & REHABILITATION

## 2024-05-26 PROCEDURE — 2580000003 HC RX 258: Performed by: PHYSICAL MEDICINE & REHABILITATION

## 2024-05-26 PROCEDURE — 1280000000 HC REHAB R&B

## 2024-05-26 PROCEDURE — 97110 THERAPEUTIC EXERCISES: CPT

## 2024-05-26 PROCEDURE — 80048 BASIC METABOLIC PNL TOTAL CA: CPT

## 2024-05-26 PROCEDURE — 2580000003 HC RX 258: Performed by: PHYSICIAN ASSISTANT

## 2024-05-26 PROCEDURE — 6370000000 HC RX 637 (ALT 250 FOR IP): Performed by: PHYSICIAN ASSISTANT

## 2024-05-26 PROCEDURE — 36415 COLL VENOUS BLD VENIPUNCTURE: CPT

## 2024-05-26 RX ORDER — HYDRALAZINE HYDROCHLORIDE 50 MG/1
50 TABLET, FILM COATED ORAL EVERY 8 HOURS SCHEDULED
Status: DISCONTINUED | OUTPATIENT
Start: 2024-05-26 | End: 2024-05-27

## 2024-05-26 RX ADMIN — MEROPENEM 500 MG: 500 INJECTION, POWDER, FOR SOLUTION INTRAVENOUS at 16:00

## 2024-05-26 RX ADMIN — SODIUM CHLORIDE 25 ML: 9 INJECTION, SOLUTION INTRAVENOUS at 03:55

## 2024-05-26 RX ADMIN — CARVEDILOL 25 MG: 25 TABLET, FILM COATED ORAL at 09:38

## 2024-05-26 RX ADMIN — AMLODIPINE BESYLATE 10 MG: 10 TABLET ORAL at 09:37

## 2024-05-26 RX ADMIN — ASPIRIN 81 MG: 81 TABLET, CHEWABLE ORAL at 20:04

## 2024-05-26 RX ADMIN — SODIUM CHLORIDE, PRESERVATIVE FREE 10 ML: 5 INJECTION INTRAVENOUS at 09:42

## 2024-05-26 RX ADMIN — PANTOPRAZOLE SODIUM 40 MG: 40 TABLET, DELAYED RELEASE ORAL at 05:15

## 2024-05-26 RX ADMIN — CARVEDILOL 25 MG: 25 TABLET, FILM COATED ORAL at 20:04

## 2024-05-26 RX ADMIN — DIPHENHYDRAMINE HYDROCHLORIDE, ZINC ACETATE: 2; .1 CREAM TOPICAL at 16:02

## 2024-05-26 RX ADMIN — MEROPENEM 500 MG: 500 INJECTION, POWDER, FOR SOLUTION INTRAVENOUS at 03:55

## 2024-05-26 RX ADMIN — SENNOSIDES 17.2 MG: 8.6 TABLET, FILM COATED ORAL at 09:38

## 2024-05-26 RX ADMIN — FERROUS SULFATE TAB 325 MG (65 MG ELEMENTAL FE) 325 MG: 325 (65 FE) TAB at 09:38

## 2024-05-26 RX ADMIN — SODIUM CHLORIDE, PRESERVATIVE FREE 10 ML: 5 INJECTION INTRAVENOUS at 20:34

## 2024-05-26 RX ADMIN — HYDRALAZINE HYDROCHLORIDE 50 MG: 50 TABLET ORAL at 20:33

## 2024-05-26 RX ADMIN — ATORVASTATIN CALCIUM 20 MG: 10 TABLET, FILM COATED ORAL at 20:04

## 2024-05-26 RX ADMIN — LEVOTHYROXINE SODIUM 75 MCG: 0.07 TABLET ORAL at 05:15

## 2024-05-26 RX ADMIN — INSULIN LISPRO 2 UNITS: 100 INJECTION, SOLUTION INTRAVENOUS; SUBCUTANEOUS at 12:10

## 2024-05-26 RX ADMIN — INSULIN GLARGINE 5 UNITS: 100 INJECTION, SOLUTION SUBCUTANEOUS at 20:32

## 2024-05-26 RX ADMIN — HYDRALAZINE HYDROCHLORIDE 50 MG: 50 TABLET ORAL at 14:41

## 2024-05-26 RX ADMIN — ENOXAPARIN SODIUM 30 MG: 100 INJECTION SUBCUTANEOUS at 09:37

## 2024-05-26 RX ADMIN — SODIUM ZIRCONIUM CYCLOSILICATE 10 G: 10 POWDER, FOR SUSPENSION ORAL at 09:37

## 2024-05-26 RX ADMIN — INSULIN LISPRO 5 UNITS: 100 INJECTION, SOLUTION INTRAVENOUS; SUBCUTANEOUS at 12:10

## 2024-05-26 ASSESSMENT — PAIN - FUNCTIONAL ASSESSMENT: PAIN_FUNCTIONAL_ASSESSMENT: PREVENTS OR INTERFERES SOME ACTIVE ACTIVITIES AND ADLS

## 2024-05-26 ASSESSMENT — PAIN SCALES - GENERAL
PAINLEVEL_OUTOF10: 0
PAINLEVEL_OUTOF10: 0
PAINLEVEL_OUTOF10: 6
PAINLEVEL_OUTOF10: 4
PAINLEVEL_OUTOF10: 0

## 2024-05-26 ASSESSMENT — PAIN DESCRIPTION - LOCATION: LOCATION: FOOT

## 2024-05-26 ASSESSMENT — PAIN DESCRIPTION - FREQUENCY: FREQUENCY: INTERMITTENT

## 2024-05-26 ASSESSMENT — PAIN DESCRIPTION - PAIN TYPE: TYPE: CHRONIC PAIN

## 2024-05-26 ASSESSMENT — PAIN DESCRIPTION - DESCRIPTORS: DESCRIPTORS: ACHING

## 2024-05-26 ASSESSMENT — PAIN DESCRIPTION - ORIENTATION: ORIENTATION: LEFT;RIGHT

## 2024-05-26 ASSESSMENT — PAIN DESCRIPTION - ONSET: ONSET: ON-GOING

## 2024-05-26 NOTE — PROGRESS NOTES
Occupational Therapy    Physical Rehabilitation: OCCUPATIONAL THERAPY     [x] daily progress note       [] discharge       Patient Name:  Greg Easton   :  1940 MRN: [id number]  Room:  [address] [place] [address] Date of Admission: 2024  Rehabilitation Diagnosis:   Syncope and collapse [R55]  Debility [R53.81]       Date 2024       Day of ARU Week:  2   Time IN/OUT 6201-3659, 1747-1129   Individual Tx Minutes 85+35   Group Tx Minutes    Co-Treat Minutes    Concurrent Tx Minutes    TOTAL Tx Time Mins 120   Variance Time    Variance Reason []   Refusal due to:     []   Medical hold/reason:    []   Illness   []   Off Unit for test/procedure  []   Extra time needed to complete task  []   Therapeutic need  []   Make up mins were attempted but pt unable to complete due to (specify)  []   Other (specify):   Restrictions Restrictions/Precautions: Fall Risk, Contact Precautions         Communication with other providers: [x]   OK to see per nursing:     []   Spoke with team member regarding:      Subjective observations and cognitive status: Pt supine in bed upon arrival, agreeable to therapy at this time wanting to get in the shower.     Pain level/location:    0/10       Location: Denies   Discharge recommendations  Anticipated discharge date:    Destination: []home alone   []home alone w assist prn   [x] home w/ family    [] Continuous supervision       []SNF    [] Assisted living     [] Other:   Continued therapy: [x]HHC OT  []OUTPATIENT  OT   [] No Further OT  Equipment needs: None       ADLs:      UB/LB Bathing: Shower/Bathe Self  Assistance Needed: Independent  Comment: Completed mostly in seated position.  CARE Score: 6  Discharge Goal: Independent    UB Dressing: Upper Body Dressing  Assistance Needed: Independent  Comment: X  CARE Score: 6  Discharge Goal: Independent         LB Dressing: Lower Body Dressing  Assistance Needed: Independent  Comment: With AE.  CARE Score: 6  Discharge Goal:

## 2024-05-26 NOTE — PROGRESS NOTES
V2.0  [facility] Hospitalist Progress Note      Name:  Greg Easton /Age/Sex: 1940  (83 y.o. male)   MRN & CSN:  [id number] & [id number] Encounter Date/Time: 2024 10:39 AM EDT    Location:  [facility]note 3]1007A PCP: Jacobo Zazueta MD       Hospital Day: 9    Assessment and Plan:   Greg Easton is a 83 y.o. male with pmh of chronic lower back pain with peripheral neuropathy, type 2 diabetes, hypertension, acquired hypothyroidism, CKD 3B/4 and hyperlipidemia, chronic suprapubic catheter who was originally admitted to hospital from May 14 for syncope and collapse due to orthostatic hypotension.  Patient was found ESBL positive in urine culture.  Patient is \on meropenem for 14 days (last dose on May 30) patient is discharged to ARU on May 18th for physical dibility.  Internal medicine was consulted for medical management on May 19th.     Plan:  Chronic low pack pain with peripheral neuropathy  - MRI L-spine with multilevel lumbar spondylosis.   - Currently in IPR.   - Continue therapy.      Catheter Assoc. UTI with ESBL E coli  Bladder Spasms  - Continue IV Merrem, ending on  per ID.   - Suprapubic cath exchange and cystoscopy and ureteral stent exchange on  and   -Urology Consulted: Jon Cherry PA-C, consider trial of Myrbetriq or Gemtesa as outpatient, will discuss with Dr. Mckeon, CHAZ with him in 1-2 weeks for re-evaluation of SPT exchange      T2DM with hyperglycemia  - Last A1c 8.0% in 2024.  - Continue Insulin Lantus  qhs and Lispro TIDWM with LCSI.  -glucose dropped  evening to 46, 55, stable this AM, and last evening. Dr. Garcia is following and treating DM     Essential hypertension  - Continue Norvasc and Coreg.  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

## 2024-05-26 NOTE — PROGRESS NOTES
Physical Therapy  [x] daily progress note       [] discharge       Patient Name:  Greg Easton   :  1940 MRN: [id number]  Room:  [address] [place] [address] Date of Admission: 2024  Rehabilitation Diagnosis:   Syncope and collapse [R55]  Debility [R53.81]       Date 2024       Day of ARU Week:  2   Time IN/OUT 1:00-2:00   Individual Tx Minutes 60   TOTAL Tx Time Mins 60   Restrictions Restrictions/Precautions  Restrictions/Precautions: Fall Risk, Contact Precautions  Position Activity Restriction  Other position/activity restrictions: IV access RUE, suprapubic catheter   Communication with other providers: [x]   OK to see per nursing:     []   Spoke with team member regarding:      Subjective observations and cognitive status: Upon arrival to pt room, pt was seated in recliner finishing up lunch. Pt was agreeable to therapy.     Pain level/location:   0 /10     Discharge recommendations  Anticipated discharge date:    Destination: []home alone   []home alone with assist PRN     [] home w/ family      [] Continuous supervision  []SNF    [] Assisted living     [] Other:   Continued therapy: [x]HHC PT  []OUTPATIENT  PT   [] No Further PT  Equipment needs: none         Bed Mobility:           [x]   Pt received out of bed       Transfers:    Sit--> Stand:  SBA  Stand --> Sit:   SBA  Chair-->Bed/Bed --> Chair:   SBA  Assistive device required for transfer:   2WW  Pt states feeling very fatigue after lunch, nursing was notified.    Gait:    Distance:  20' + 108' + 237'  Assistance:  first walk, min A and pt had a LOB which required mod A for therapist to correct. CGA for other two due to balance impairments.  Device:  2WW  Gait Quality:  shuffling gait pattern, pt states he feels as though he can't  his feet.      Additional Therapeutic activities/exercises completed this date:     [x]   Nu-step:  Time: 15       Level: 3        #Steps:   687   []   Rebounder:    []  Seated     []  Standing        []

## 2024-05-27 LAB
ANION GAP SERPL CALCULATED.3IONS-SCNC: 13 MMOL/L (ref 7–16)
BASOPHILS ABSOLUTE: 0 K/CU MM
BASOPHILS RELATIVE PERCENT: 0.3 % (ref 0–1)
BUN SERPL-MCNC: 45 MG/DL (ref 6–23)
CALCIUM SERPL-MCNC: 8.1 MG/DL (ref 8.3–10.6)
CHLORIDE BLD-SCNC: 110 MMOL/L (ref 99–110)
CO2: 20 MMOL/L (ref 21–32)
CREAT SERPL-MCNC: 2.1 MG/DL (ref 0.9–1.3)
DIFFERENTIAL TYPE: ABNORMAL
EOSINOPHILS ABSOLUTE: 0.6 K/CU MM
EOSINOPHILS RELATIVE PERCENT: 8.4 % (ref 0–3)
GFR, ESTIMATED: 30 ML/MIN/1.73M2
GLUCOSE BLD-MCNC: 129 MG/DL (ref 70–99)
GLUCOSE BLD-MCNC: 140 MG/DL (ref 70–99)
GLUCOSE BLD-MCNC: 159 MG/DL (ref 70–99)
GLUCOSE BLD-MCNC: 254 MG/DL (ref 70–99)
GLUCOSE BLD-MCNC: 65 MG/DL (ref 70–99)
GLUCOSE SERPL-MCNC: 164 MG/DL (ref 70–99)
HCT VFR BLD CALC: 28.5 % (ref 42–52)
HEMOGLOBIN: 8.8 GM/DL (ref 13.5–18)
IMMATURE NEUTROPHIL %: 0.1 % (ref 0–0.43)
LYMPHOCYTES ABSOLUTE: 1.2 K/CU MM
LYMPHOCYTES RELATIVE PERCENT: 17.4 % (ref 24–44)
MCH RBC QN AUTO: 30.1 PG (ref 27–31)
MCHC RBC AUTO-ENTMCNC: 30.9 % (ref 32–36)
MCV RBC AUTO: 97.6 FL (ref 78–100)
MONOCYTES ABSOLUTE: 0.6 K/CU MM
MONOCYTES RELATIVE PERCENT: 7.8 % (ref 0–4)
NEUTROPHILS ABSOLUTE: 4.6 K/CU MM
NEUTROPHILS RELATIVE PERCENT: 66 % (ref 36–66)
NUCLEATED RBC %: 0 %
PDW BLD-RTO: 15.8 % (ref 11.7–14.9)
PLATELET # BLD: 205 K/CU MM (ref 140–440)
PMV BLD AUTO: 11 FL (ref 7.5–11.1)
POTASSIUM SERPL-SCNC: 4.9 MMOL/L (ref 3.5–5.1)
RBC # BLD: 2.92 M/CU MM (ref 4.6–6.2)
SODIUM BLD-SCNC: 143 MMOL/L (ref 135–145)
TOTAL IMMATURE NEUTOROPHIL: 0.01 K/CU MM
TOTAL NUCLEATED RBC: 0 K/CU MM
WBC # BLD: 7 K/CU MM (ref 4–10.5)

## 2024-05-27 PROCEDURE — 6370000000 HC RX 637 (ALT 250 FOR IP): Performed by: INTERNAL MEDICINE

## 2024-05-27 PROCEDURE — 6370000000 HC RX 637 (ALT 250 FOR IP): Performed by: PHYSICIAN ASSISTANT

## 2024-05-27 PROCEDURE — 2580000003 HC RX 258: Performed by: PHYSICIAN ASSISTANT

## 2024-05-27 PROCEDURE — 82962 GLUCOSE BLOOD TEST: CPT

## 2024-05-27 PROCEDURE — 6360000002 HC RX W HCPCS: Performed by: PHYSICIAN ASSISTANT

## 2024-05-27 PROCEDURE — 6370000000 HC RX 637 (ALT 250 FOR IP): Performed by: PHYSICAL MEDICINE & REHABILITATION

## 2024-05-27 PROCEDURE — 94761 N-INVAS EAR/PLS OXIMETRY MLT: CPT

## 2024-05-27 PROCEDURE — 36415 COLL VENOUS BLD VENIPUNCTURE: CPT

## 2024-05-27 PROCEDURE — 85025 COMPLETE CBC W/AUTO DIFF WBC: CPT

## 2024-05-27 PROCEDURE — 6370000000 HC RX 637 (ALT 250 FOR IP): Performed by: NURSE PRACTITIONER

## 2024-05-27 PROCEDURE — 1280000000 HC REHAB R&B

## 2024-05-27 PROCEDURE — 99233 SBSQ HOSP IP/OBS HIGH 50: CPT | Performed by: PHYSICAL MEDICINE & REHABILITATION

## 2024-05-27 PROCEDURE — 80048 BASIC METABOLIC PNL TOTAL CA: CPT

## 2024-05-27 PROCEDURE — 2580000003 HC RX 258: Performed by: PHYSICAL MEDICINE & REHABILITATION

## 2024-05-27 RX ORDER — HYDRALAZINE HYDROCHLORIDE 50 MG/1
100 TABLET, FILM COATED ORAL EVERY 8 HOURS SCHEDULED
Status: DISCONTINUED | OUTPATIENT
Start: 2024-05-27 | End: 2024-05-30 | Stop reason: HOSPADM

## 2024-05-27 RX ORDER — OXYBUTYNIN CHLORIDE 5 MG/1
5 TABLET ORAL 3 TIMES DAILY
Status: DISCONTINUED | OUTPATIENT
Start: 2024-05-27 | End: 2024-05-30 | Stop reason: HOSPADM

## 2024-05-27 RX ADMIN — MEROPENEM 500 MG: 500 INJECTION, POWDER, FOR SOLUTION INTRAVENOUS at 03:18

## 2024-05-27 RX ADMIN — AMLODIPINE BESYLATE 10 MG: 10 TABLET ORAL at 10:09

## 2024-05-27 RX ADMIN — ENOXAPARIN SODIUM 30 MG: 100 INJECTION SUBCUTANEOUS at 10:09

## 2024-05-27 RX ADMIN — INSULIN LISPRO 2 UNITS: 100 INJECTION, SOLUTION INTRAVENOUS; SUBCUTANEOUS at 12:37

## 2024-05-27 RX ADMIN — HYDRALAZINE HYDROCHLORIDE 100 MG: 50 TABLET ORAL at 12:35

## 2024-05-27 RX ADMIN — PANTOPRAZOLE SODIUM 40 MG: 40 TABLET, DELAYED RELEASE ORAL at 06:30

## 2024-05-27 RX ADMIN — LEVOTHYROXINE SODIUM 75 MCG: 0.07 TABLET ORAL at 06:30

## 2024-05-27 RX ADMIN — SODIUM CHLORIDE, PRESERVATIVE FREE 10 ML: 5 INJECTION INTRAVENOUS at 15:00

## 2024-05-27 RX ADMIN — CARVEDILOL 25 MG: 25 TABLET, FILM COATED ORAL at 10:09

## 2024-05-27 RX ADMIN — HYDRALAZINE HYDROCHLORIDE 100 MG: 50 TABLET ORAL at 20:53

## 2024-05-27 RX ADMIN — CARVEDILOL 25 MG: 25 TABLET, FILM COATED ORAL at 20:53

## 2024-05-27 RX ADMIN — ASPIRIN 81 MG: 81 TABLET, CHEWABLE ORAL at 20:58

## 2024-05-27 RX ADMIN — INSULIN LISPRO 5 UNITS: 100 INJECTION, SOLUTION INTRAVENOUS; SUBCUTANEOUS at 12:36

## 2024-05-27 RX ADMIN — ACETAMINOPHEN 650 MG: 325 TABLET ORAL at 20:53

## 2024-05-27 RX ADMIN — ATORVASTATIN CALCIUM 20 MG: 10 TABLET, FILM COATED ORAL at 20:53

## 2024-05-27 RX ADMIN — SODIUM CHLORIDE, PRESERVATIVE FREE 10 ML: 5 INJECTION INTRAVENOUS at 20:54

## 2024-05-27 RX ADMIN — HYDRALAZINE HYDROCHLORIDE 50 MG: 50 TABLET ORAL at 06:30

## 2024-05-27 RX ADMIN — OXYBUTYNIN CHLORIDE 5 MG: 5 TABLET ORAL at 12:35

## 2024-05-27 RX ADMIN — MEROPENEM 500 MG: 500 INJECTION, POWDER, FOR SOLUTION INTRAVENOUS at 15:57

## 2024-05-27 RX ADMIN — OXYBUTYNIN CHLORIDE 5 MG: 5 TABLET ORAL at 20:53

## 2024-05-27 ASSESSMENT — PAIN DESCRIPTION - LOCATION: LOCATION: FOOT

## 2024-05-27 ASSESSMENT — PAIN DESCRIPTION - DESCRIPTORS: DESCRIPTORS: ACHING;STABBING

## 2024-05-27 ASSESSMENT — PAIN SCALES - GENERAL
PAINLEVEL_OUTOF10: 8
PAINLEVEL_OUTOF10: 7

## 2024-05-27 ASSESSMENT — PAIN - FUNCTIONAL ASSESSMENT: PAIN_FUNCTIONAL_ASSESSMENT: ACTIVITIES ARE NOT PREVENTED

## 2024-05-27 ASSESSMENT — PAIN DESCRIPTION - ORIENTATION: ORIENTATION: RIGHT;LEFT

## 2024-05-27 ASSESSMENT — PAIN DESCRIPTION - PAIN TYPE: TYPE: ACUTE PAIN

## 2024-05-27 NOTE — PROGRESS NOTES
Progress Note( Dr. Garcia)  5/27/2024  Subjective:   Admit Date: 5/18/2024  PCP: Jacobo Zazueta MD    Admitted For :      Consulted For:  General debility UTI and orthostatic hypotension  Patient admitted to hospital initially on medical floor on May 13, 2024 and was discharged and transferred to rehab unit on May 19, 2024    Interval History: Better control of blood glucose patient had an episode of hypoglycemia yesterday       Denies any chest pains,   Denies SOB .   Denies nausea or vomiting.   No new bowel or bladder symptoms.  Complains of some issue about the urinary bladder      Intake/Output Summary (Last 24 hours) at 5/27/2024 1601  Last data filed at 5/27/2024 0830  Gross per 24 hour   Intake 200 ml   Output 1325 ml   Net -1125 ml         DATA    CBC:   Recent Labs     05/24/24  1820 05/25/24  0502 05/27/24  0816   WBC 7.1 7.2 7.0   HGB 6.8* 8.3* 8.8*    177 205      CMP:  Recent Labs     05/25/24  0502 05/26/24  0838 05/27/24  0816    142 143   K 5.7* 4.8 4.9   * 110 110   CO2 19* 19* 20*   BUN 52* 45* 45*   CREATININE 2.2* 2.1* 2.1*   CALCIUM 7.8* 7.9* 8.1*       Lipids:   Lab Results   Component Value Date/Time    CHOL 171 05/30/2023 06:48 AM    HDL 43 05/30/2023 06:48 AM    TRIG 129 05/30/2023 06:48 AM     Glucose:  Recent Labs     05/27/24  0438 05/27/24  0733 05/27/24  1208   POCGLU 159* 140* 254*       [id number]:  Lab Results   Component Value Date/Time    LABA1C 8.0 05/12/2024 08:26 AM     High Sensitivity TSH:   Lab Results   Component Value Date/Time    TSHHS 3.070 02/02/2024 02:55 AM     Free T3: No results found for: \"FT3\"  Free T4:  Lab Results   Component Value Date/Time    T4FREE 1.55 05/24/2023 01:26 AM       No orders to display        Scheduled Medicines   Medications:    oxyBUTYnin  5 mg Oral TID    hydrALAZINE  100 mg Oral 3 times per day    insulin glargine  5 Units SubCUTAneous Nightly    amLODIPine  10 mg Oral Daily    insulin lispro  5 Units

## 2024-05-27 NOTE — PROGRESS NOTES
V2.0  [facility] Hospitalist Progress Note      Name:  Greg Easton /Age/Sex: 1940  (83 y.o. male)   MRN & CSN:  [id number] & [id number] Encounter Date/Time: 2024 11:05 AM EDT    [facility]  [facility] PCP: Jacobo Zazueta MD       Hospital Day: 10    Assessment and Plan:   Greg Easton is a 83 y.o. male with pmh of chronic lower back pain with peripheral neuropathy, type 2 diabetes, hypertension, acquired hypothyroidism, CKD 3B/4 and hyperlipidemia, chronic suprapubic catheter who was originally admitted to hospital from May 14 for syncope and collapse due to orthostatic hypotension.  Patient was found ESBL positive in urine culture.  Patient is \on meropenem for 14 days (last dose on May 30) patient is discharged to ARU on May 18th for physical dibility.  Internal medicine was consulted for medical management on May 19th.      Plan:  Chronic low pack pain with peripheral neuropathy  - MRI L-spine with multilevel lumbar spondylosis.   - Currently in IPR.   - Continue therapy.      Catheter Assoc. UTI with ESBL E coli  Bladder Spasms  - Continue IV Merrem, ending on  per ID.   - Suprapubic cath exchange and cystoscopy and ureteral stent exchange on  and   -Urology Consulted: Jon Cherry PA-C, consider trial of Myrbetriq or Gemtesa as outpatient, will discuss with Dr. Mckeon, CHAZ with him in 1-2 weeks for re-evaluation of SPT exchange      T2DM with hyperglycemia  - Last A1c 8.0% in 2024.  - Continue Insulin Lantus  qhs and Lispro TIDWM with LCSI.  -glucose dropped  evening to 46, 55, stable this AM, and last evening. Dr. Garcia is following and treating DM     Essential hypertension  - Continue Norvasc and Coreg.  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

## 2024-05-27 NOTE — PROGRESS NOTES
guarding or masses noted.  Suprapubic catheter emerging from the lower abdomen.  No erythema or tenderness at the site.    Upper extremities: Slow and deliberate he was able to bring his hands up to meet mine.  Fair  strength.  No tremor.  No DTRs.    Lower extremities: Calves were soft.  Heels were clear.  No signs of DVT.  4 -/5 strength across the knees and ankles.    Sitting balance was good.  Standing balance was poor.    Lab Results   Component Value Date    WBC 7.2 05/25/2024    HGB 8.3 (L) 05/25/2024    HCT 26.7 (L) 05/25/2024    MCV 97.8 05/25/2024     05/25/2024     Lab Results   Component Value Date    INR 1.2 04/16/2024    INR 1.2 02/02/2024    INR 1.02 05/22/2023    PROTIME 15.7 (H) 04/16/2024    PROTIME 15.1 (H) 02/02/2024    PROTIME 12.9 05/22/2023     Lab Results   Component Value Date    CREATININE 2.1 (H) 05/26/2024    BUN 45 (H) 05/26/2024     05/26/2024    K 4.8 05/26/2024     05/26/2024    CO2 19 (L) 05/26/2024     Lab Results   Component Value Date    ALT <5 (L) 05/15/2024    AST 11 (L) 05/15/2024    ALKPHOS 76 05/15/2024    BILITOT 0.1 05/15/2024       Expected length of stay  prior to a supervised level of function for discharge home with a walker and [facility] OT/PT is 5/30/2024.    Recommendations:    Functional Impairments -his tolerance is slowly building for the prescribed PT/ minutes 5 out of every 7 days.  With his generalized weakness, poor coordination and sensory deficits, we continue to focus on gait, transfers, ADLs, iADLs, safety awareness, equipment management, medication management, patient/family teaching, pain management.  Caregiver training is to be coordinated by case management.  Verbal cues and contact-guard assistance needed for transfers.     ESBL E. coli UTI-in the setting of chronic suprapubic catheter.  His progress continues to be monitored by urology and infectious disease.  Continuing with the IV meropenem for 14 days per ID (may discharge

## 2024-05-27 NOTE — PLAN OF CARE
Problem: Safety - Adult  Goal: Free from fall injury  Outcome: Progressing     Problem: ABCDS Injury Assessment  Goal: Absence of physical injury  Outcome: Progressing     
ARU Admission Assessment - [facility]      A Complete drug regimen review was completed for this patient this date.   [x]  No clinically significant medication issue was identified   []  Yes, a clinically significant medication issue was identified     []  Adverse Drug Event:    []  Allergy:    []  Side Effect:    []  Ineffective Therapy:    []  Drug interaction:     []  Duplicate Therapy:    []  Untreated Indication:    []  Non-adherence:    []  Other:  Nursing contacted the physician:       Date:  05-18-24              Time:1630    Actions recommended by physician were completed:   Date:                 Time:  Action(s) Taken:             []  New Physician Order Received    []  Issue Noted by Physician; However No Action Required    []  Other:        Ethnicity  \"Are you of , /a, or South African origin?\"  Check all that apply:  [x] A.  No, not of , /a, or South African Origin  [] B.  Yes, German, German American, Chicano/a  [] C.  Yes, Tristanian  [] D.  Yes, Ed  [] E.  Yes, another , , or South African origin  [] X.  Patient unable to respond    Race  \"What is your race?\"  Check all that apply:  [] A.  White  [x] B.  Black or   [] C.   or   [] D.   Belarusian  [] E.  Chinese  [] F.  South Korean  [] G. Maltese  [] H.  Yoruba  [] I.  Hungarian  [] J.  Other   [] K.    [] L.  Latvian or Eboni  [] M.  Malaysian  [] N.  Other   [] X.  Patient unable to respond    Language  A.  \"What is your preferred language?\"   English    B.  \"Do you need or want an  to communicate with a doctor or health care staff?\"  Check only one:  [x] 0.  No  [] 1.  Yes  [] 9.  Unable to determine    Transportation  \"In the past 6-12 months, has lack of transportation kept you from medical appointments, meetings, work, or from getting things needed for daily living?\"  Check all that apply:  [] A.  Yes, it has kept 
  Total expected IRF days 12                                            Physician anticipated functional outcomes:      Functional Prognosis: [x] Good       [] Fair     [] Guarded        Rehab Goals:   [x] Return to premorbid function of_______________________________.    [x] Independent   [] Mod I  [] Supervision  [] CGA   [] Min A   [] Mod A  Level for ambulation []without assistive device  [] with assistive device        [x] Independent   [] Mod I  [] Supervision [] CGA   [] Min A   [] Mod A  Level for transfers []without assistive device  [] with assistive device         [x] Independent   [] Mod I  [] Supervision [] CGA   [] Min A   [] Mod A Level with ADL's []without assistive device   [] with assistive device     ___________________     Level with cognitive skills requiring [] No assist [] Supervision  [] Active Assist/Cues     [x] Maximize level of mobility and ADL's to decrease burden on caregiver    IPOC brief synthesis of Preadmission Screen, Post-Admission Evaluation, and Therapy Evaluations:   Anticipated discharge destination:    [x] Home Independently   [] Home with supervision    []SNF     [] Other       This plan has been reviewed with me in a language I understand. I have had the opportunity to include my input with my therapy team.    ________________________________________________   ______________________  Patient/Significant Other      Date    I have reviewed this initial plan of care and agree with its contents:    Title   Name    Date    Time    Physician:   Manuel Floyd MD 05/21/24 8:04 PM    Case Mgmt:  Tammy Duval 5/20/2024 1035    OT: LAWSON Quiros, LIBIAR/L #[id number] 05/20/24 1513    PT: Carolyn Berry PT     05/20/2024    17:00    RN: Marilee Roach RN    ST:    Dietician: Fang Hayes RD, ILEANA 05/20/2024  16:11     ADMIT DATE:5/18/2024

## 2024-05-28 PROBLEM — E44.0 MODERATE PROTEIN-CALORIE MALNUTRITION (HCC): Status: ACTIVE | Noted: 2024-05-28

## 2024-05-28 PROBLEM — N30.00 ACUTE CYSTITIS WITHOUT HEMATURIA: Status: ACTIVE | Noted: 2024-05-28

## 2024-05-28 PROBLEM — R33.9 ACUTE ON CHRONIC URINARY RETENTION: Status: ACTIVE | Noted: 2024-05-28

## 2024-05-28 LAB
GLUCOSE BLD-MCNC: 115 MG/DL (ref 70–99)
GLUCOSE BLD-MCNC: 158 MG/DL (ref 70–99)
GLUCOSE BLD-MCNC: 176 MG/DL (ref 70–99)
GLUCOSE BLD-MCNC: 191 MG/DL (ref 70–99)
GLUCOSE BLD-MCNC: 192 MG/DL (ref 70–99)

## 2024-05-28 PROCEDURE — 6370000000 HC RX 637 (ALT 250 FOR IP): Performed by: NURSE PRACTITIONER

## 2024-05-28 PROCEDURE — 82962 GLUCOSE BLOOD TEST: CPT

## 2024-05-28 PROCEDURE — 97530 THERAPEUTIC ACTIVITIES: CPT

## 2024-05-28 PROCEDURE — 99233 SBSQ HOSP IP/OBS HIGH 50: CPT | Performed by: PHYSICAL MEDICINE & REHABILITATION

## 2024-05-28 PROCEDURE — 97110 THERAPEUTIC EXERCISES: CPT

## 2024-05-28 PROCEDURE — 6370000000 HC RX 637 (ALT 250 FOR IP): Performed by: PHYSICIAN ASSISTANT

## 2024-05-28 PROCEDURE — 6360000002 HC RX W HCPCS: Performed by: PHYSICIAN ASSISTANT

## 2024-05-28 PROCEDURE — 1280000000 HC REHAB R&B

## 2024-05-28 PROCEDURE — 2580000003 HC RX 258: Performed by: PHYSICAL MEDICINE & REHABILITATION

## 2024-05-28 PROCEDURE — 6370000000 HC RX 637 (ALT 250 FOR IP): Performed by: PHYSICAL MEDICINE & REHABILITATION

## 2024-05-28 PROCEDURE — 94761 N-INVAS EAR/PLS OXIMETRY MLT: CPT

## 2024-05-28 PROCEDURE — 97116 GAIT TRAINING THERAPY: CPT

## 2024-05-28 PROCEDURE — 6370000000 HC RX 637 (ALT 250 FOR IP): Performed by: INTERNAL MEDICINE

## 2024-05-28 PROCEDURE — 2580000003 HC RX 258: Performed by: PHYSICIAN ASSISTANT

## 2024-05-28 PROCEDURE — 97535 SELF CARE MNGMENT TRAINING: CPT

## 2024-05-28 RX ORDER — GUAIFENESIN 600 MG/1
600 TABLET, EXTENDED RELEASE ORAL 2 TIMES DAILY
Status: DISCONTINUED | OUTPATIENT
Start: 2024-05-28 | End: 2024-05-30 | Stop reason: HOSPADM

## 2024-05-28 RX ADMIN — MEROPENEM 500 MG: 500 INJECTION, POWDER, FOR SOLUTION INTRAVENOUS at 03:45

## 2024-05-28 RX ADMIN — PANTOPRAZOLE SODIUM 40 MG: 40 TABLET, DELAYED RELEASE ORAL at 06:20

## 2024-05-28 RX ADMIN — ATORVASTATIN CALCIUM 20 MG: 10 TABLET, FILM COATED ORAL at 20:33

## 2024-05-28 RX ADMIN — OXYBUTYNIN CHLORIDE 5 MG: 5 TABLET ORAL at 12:23

## 2024-05-28 RX ADMIN — HYDRALAZINE HYDROCHLORIDE 100 MG: 50 TABLET ORAL at 14:11

## 2024-05-28 RX ADMIN — INSULIN LISPRO 5 UNITS: 100 INJECTION, SOLUTION INTRAVENOUS; SUBCUTANEOUS at 09:42

## 2024-05-28 RX ADMIN — HYDRALAZINE HYDROCHLORIDE 100 MG: 50 TABLET ORAL at 06:20

## 2024-05-28 RX ADMIN — FERROUS SULFATE TAB 325 MG (65 MG ELEMENTAL FE) 325 MG: 325 (65 FE) TAB at 09:41

## 2024-05-28 RX ADMIN — OXYBUTYNIN CHLORIDE 5 MG: 5 TABLET ORAL at 20:33

## 2024-05-28 RX ADMIN — LEVOTHYROXINE SODIUM 75 MCG: 0.07 TABLET ORAL at 06:20

## 2024-05-28 RX ADMIN — SENNOSIDES 17.2 MG: 8.6 TABLET, FILM COATED ORAL at 09:41

## 2024-05-28 RX ADMIN — GUAIFENESIN 600 MG: 600 TABLET, EXTENDED RELEASE ORAL at 09:41

## 2024-05-28 RX ADMIN — INSULIN GLARGINE 5 UNITS: 100 INJECTION, SOLUTION SUBCUTANEOUS at 20:58

## 2024-05-28 RX ADMIN — OXYBUTYNIN CHLORIDE 5 MG: 5 TABLET ORAL at 09:40

## 2024-05-28 RX ADMIN — SODIUM CHLORIDE, PRESERVATIVE FREE 10 ML: 5 INJECTION INTRAVENOUS at 09:41

## 2024-05-28 RX ADMIN — INSULIN LISPRO 5 UNITS: 100 INJECTION, SOLUTION INTRAVENOUS; SUBCUTANEOUS at 17:55

## 2024-05-28 RX ADMIN — HYDRALAZINE HYDROCHLORIDE 100 MG: 50 TABLET ORAL at 21:45

## 2024-05-28 RX ADMIN — AMLODIPINE BESYLATE 10 MG: 10 TABLET ORAL at 09:41

## 2024-05-28 RX ADMIN — CARVEDILOL 25 MG: 25 TABLET, FILM COATED ORAL at 09:41

## 2024-05-28 RX ADMIN — ENOXAPARIN SODIUM 30 MG: 100 INJECTION SUBCUTANEOUS at 09:41

## 2024-05-28 RX ADMIN — GUAIFENESIN 600 MG: 600 TABLET, EXTENDED RELEASE ORAL at 20:33

## 2024-05-28 RX ADMIN — CARVEDILOL 25 MG: 25 TABLET, FILM COATED ORAL at 20:33

## 2024-05-28 RX ADMIN — MEROPENEM 500 MG: 500 INJECTION, POWDER, FOR SOLUTION INTRAVENOUS at 18:14

## 2024-05-28 RX ADMIN — ASPIRIN 81 MG: 81 TABLET, CHEWABLE ORAL at 20:33

## 2024-05-28 RX ADMIN — SODIUM CHLORIDE, PRESERVATIVE FREE 10 ML: 5 INJECTION INTRAVENOUS at 20:35

## 2024-05-28 NOTE — FLOWSHEET NOTE
[x] daily progress note       [] discharge       Patient Name:  Greg Easton   :  1940 MRN: [id number]  Room:  [address] [place] [address] Date of Admission: 2024  Rehabilitation Diagnosis:   Syncope and collapse [R55]  Debility [R53.81]       Date 2024       Day of ARU Week:  4   Time IN/-1000   Individual Tx Minutes 60   TOTAL Tx Time Mins 60   Restrictions Restrictions/Precautions  Restrictions/Precautions: Fall Risk, Contact Precautions  Position Activity Restriction  Other position/activity restrictions: IV access RUE, suprapubic catheter   Communication with other providers: [x]   OK to see per nursing:     [x]   Notified nsg (Umesh) that 650 mL of urine was emptied out of catheter bag.    Subjective observations and cognitive status: Pt seen in semi-streeter's position in bed at beginning of treatment. Agreeable to therapy. Pt placed catheter leg bag on himself at beginning of session.    Pain level/location:   0 /10          Discharge recommendations   Anticipated discharge date:  2024  Destination: []home alone   []home alone with assist PRN     [x] home w/ family      [] Continuous supervision  []SNF    [] Assisted living     [] Other:   Continued therapy: [x]HHC PT  []OUTPATIENT  PT   [] No Further PT  Equipment needs: has 2WW and rollator      Bed Mobility:          Rolling R/L:  Independent   Scooting:  Independent   Lying --> Sit:  mod I with bed rail   Sit --> lying:  Independent  Pt practiced in regular, flat bed.     Transfers:    Sit--> Stand:  mod I   Stand --> Sit:   mod I   Chair-->Bed/Bed --> Chair:   mod I   Car Transfers: mod I   Assistive device required for transfer:   RW    Gait:    Walk 10 feet:  mod I   Walk 50 feet with 2 turns:  mod I   Walk 150 feet:  mod I   Other distance: 158'    Device:  RW  Gait Quality:  reciprocal pattern     Patient/Caregiver Education and Training:   [x]   Bed Mobility/Transfer technique/safety  [x]   Gait technique/sequencing  [x]   Proper use

## 2024-05-28 NOTE — PROGRESS NOTES
Occupational Therapy  Physical Rehabilitation: OCCUPATIONAL THERAPY     [x] daily progress note       [] discharge       Patient Name:  Greg Easton   :  1940 MRN: [id number]  Room:  [address] [place] [address] Date of Admission: 2024  Rehabilitation Diagnosis:   Syncope and collapse [R55]  Debility [R53.81]       Date 2024       Day of ARU Week:  4   Time IN/OUT 2698-3401   Individual Tx Minutes 60   Group Tx Minutes    Co-Treat Minutes    Concurrent Tx Minutes    TOTAL Tx Time Mins 60   Variance Time    Variance Reason []   Refusal due to:     []   Medical hold/reason:    []   Illness   []   Off Unit for test/procedure  []   Extra time needed to complete task  []   Therapeutic need  []   Make up mins were attempted but pt unable to complete due to (specify)  []   Other (specify):   Restrictions Restrictions/Precautions: Fall Risk, Contact Precautions         Communication with other providers: [x]   OK to see per nursing:     []   Spoke with team member regarding:      Subjective observations and cognitive status: Pt sitting up in W/C on approach; pleasant and agreeable to therapy.      Pain level/location:    /10       Location:    Discharge recommendations  Anticipated discharge date:    Destination: []home alone   []home alone w assist prn   [x] home w/ family    [] Continuous supervision       []SNF    [] Assisted living     [] Other:   Continued therapy: []HHC OT  []OUTPATIENT  OT   [x] No Further OT  Equipment needs: None      Toileting:   min A to pull pants up in back; Mod I  in stance to empty mccrary bag.        Toilet Transfers:   Mod I c RW   Device Used:    [x]   Standard Toilet         []   Grab Bars           []  Bedside Commode       []   Elevated Toilet          []   Other:        Bed Mobility:           [x]   Pt received out of bed       Transfers:    Sit--> Stand:  mod I    Stand --> Sit:   Mod I   Stand-Pivot:   Mod I   Other:    Assistive device required for transfer:   RW

## 2024-05-28 NOTE — PROGRESS NOTES
05/28/24 1246   Encounter Summary   Encounter Overview/Reason Follow-up   Service Provided For Patient   Referral/Consult From [facility] [place]   Support System Children   Last Encounter  05/28/24   Complexity of Encounter Low   Begin Time 1221   End Time  1247   Total Time Calculated 26 min   Spiritual/Emotional needs   Type Spiritual Support   Grief, Loss, and Adjustments   Type Adjustment to illness;Bereavement Care  (Wife passed away in 2022, Granjennifer was killed.)   Assessment/Intervention/Outcome   Assessment Hopeful;Peaceful;Calm   Intervention Active listening;Prayer (assurance of)/[place];Life review/Legacy   Outcome Coping;Expressed Gratitude;Receptive;Peace;Optimistic   Plan and Referrals   Plan/Referrals Continue Support (comment)

## 2024-05-28 NOTE — PROGRESS NOTES
Physical Rehabilitation: OCCUPATIONAL THERAPY     [x] daily progress note       [] discharge       Patient Name:  Greg Easton   :  1940 MRN: [id number]  Room:  [address] [place] [address] Date of Admission: 2024  Rehabilitation Diagnosis:   Syncope and collapse [R55]  Debility [R53.81]       Date 2024       Day of ARU Week:  4   Time IN/OUT 1100/1200   Individual Tx Minutes 60   Group Tx Minutes    Co-Treat Minutes    Concurrent Tx Minutes    TOTAL Tx Time Mins 60   Variance Time    Variance Reason []   Refusal due to:     []   Medical hold/reason:    []   Illness   []   Off Unit for test/procedure  []   Extra time needed to complete task  []   Therapeutic need  []   Make up mins were attempted but pt unable to complete due to (specify)  []   Other (specify):   Restrictions Restrictions/Precautions: Fall Risk, Contact Precautions         Communication with other providers: [x]   OK to see per nursing:     []   Spoke with team member regarding:      Subjective observations and cognitive status: Patient up in recliner pleasant and agreeable to therapy, wopulds like to wash up this date and take a full shower following day 2* to dc on Thursday      Pain level/location:    /10       Location: per patient no pain noted    Discharge recommendations  Anticipated discharge date:    Destination: []home alone   []home alone w assist prn   [x] home w/ family    [] Continuous supervision       []SNF    [] Assisted living     [] Other:   Continued therapy: []HHC OT  []OUTPATIENT  OT   [x] No Further OT  Equipment needs: None        ADLs:    Eating: Eating  Assistance Needed: Independent  Comment: X  CARE Score: 6  Discharge Goal: Independent       Oral Hygiene: Oral Hygiene  Assistance Needed: Independent  Comment: X  CARE Score: 6  Discharge Goal: Independent    UB/LB Bathing: Shower/Bathe Self  Assistance Needed: Independent  Comment: X  CARE Score: 6  Discharge Goal: Independent    UB Dressing: Upper Body

## 2024-05-28 NOTE — PROGRESS NOTES
retention-with history of chronic suprapubic catheter.  Suprapubic catheter exchanged per urology 5/13 with plans to exchange every 3 weeks going forward.  Recently he has experienced bladder spasms but the pharmacy states B&O suppositories not available; urology consulted, appreciate assistance-noted consideration of Myrbetriq or Gemtesa as outpatient.  In the meanwhile, I have prescribed low-dose Ditropan which she has tolerated without ill effect.  Chronic left hydronephrosis-with left ureteral stent (exchanged 5/13 with plans to exchange every 3 months going forward.  Follow-up with urology  Orthostatic hypotension-with baseline history of hypertension.  After cardiology initially lowered the Norvasc dose, it had to be increased to 10 mg in inpatient rehab due to an blood pressure.  He is offering less dizziness symptoms on the current dose of Norvasc.  Monitor closely for additional symptomatic hypotension.  Continue SHANNON hose when upright and out of bed.  Consider abdominal binder if symptomatic.  Encourage appropriate fluid intake.  Diabetes mellitus-with peripheral polyneuropathy.  Emphasizing gait and balance training, adaptive equipment evaluations.  Endocrinology consulted for sugar management in IPR, appreciate assistance.  Monitoring her response of medication adjustments made per endocrinology.  Chronic kidney disease stage IV-with recent hyperkalemia eval weight by nephrology.  Monitor for recurrence.  He is status post Lokelma.  Rechecking chemistries before discharge.  Chronic low back pain-MRI lumbar spine with multilevel spondylosis and stenosis.  Stenosis worse on left compared to right.  Patient with corresponding weakness left lower limb more than right lower limb which patient reports is chronic and unchanged.  Gradual progression activity through therapy.  Gentle core and lower limb strengthening.  Promote proper positioning lumbar spine with transfers.  Emphasizing nonnarcotic pain

## 2024-05-28 NOTE — PROGRESS NOTES
sign-off. Thank you for allowing us to participate in the care of your patient. Should any further needs arise, please do not hesitate to re-consult.     This patient was discussed with Dr. Strong. He was agreeable with the assessment and plan as dictated above.       Diet ADULT DIET; Regular; 5 carb choices (75 gm/meal); Double Protein Portions  ADULT ORAL NUTRITION SUPPLEMENT; Breakfast, Lunch, Dinner; Diabetic Oral Supplement   DVT Prophylaxis Per primary   Code Status Full Code   Disposition Per primary   Surrogate Decision Maker/ POALBA Easton     Personally reviewed Lab Studies and Imaging       Subjective:     Chief Complaint: physical debility     Patient seen and examined at bedside.  No acute events overnight.  Working well with therapy.  Looking forward to going home in the next couple of days.  Noted improvement in blood pressure.              Review of Systems:    Pertinent positives and negatives discussed in HPI    Objective:     Intake/Output Summary (Last 24 hours) at 5/28/2024 0711  Last data filed at 5/28/2024 0135  Gross per 24 hour   Intake --   Output 2150 ml   Net -2150 ml        Vitals:   Vitals:    05/28/24 0620   BP: (!) 158/59   Pulse:    Resp:    Temp:    SpO2:        Physical Exam:   General: NAD  Eyes: EOMI  ENT: neck supple  Cardiovascular: Regular rate and rhythm  Respiratory: Clear to auscultation, respirations even and unlabored on RA  Gastrointestinal: Soft, non tender  Genitourinary: no suprapubic tenderness  Musculoskeletal: No edema  Skin: warm, dry  Neuro: Alert.  Psych: Mood appropriate.    Medications:   Medications:    guaiFENesin  600 mg Oral BID    oxyBUTYnin  5 mg Oral TID    hydrALAZINE  100 mg Oral 3 times per day    insulin glargine  5 Units SubCUTAneous Nightly    amLODIPine  10 mg Oral Daily    insulin lispro  5 Units SubCUTAneous TID WC    sodium chloride flush  5-40 mL IntraVENous BID    insulin lispro  0-4 Units SubCUTAneous 2 times per day    enoxaparin  30

## 2024-05-28 NOTE — PROGRESS NOTES
Progress Note( Dr. Garcia)  5/28/2024  Subjective:   Admit Date: 5/18/2024  PCP: Jacobo Zazueta MD    Admitted For :      Consulted For:  General debility UTI and orthostatic hypotension  Patient admitted to hospital initially on medical floor on May 13, 2024 and was discharged and transferred to rehab unit on May 19, 2024    Interval History: Better control of blood glucose   Blood glucose has been stable for last couple of days    Denies any chest pains,   Denies SOB .   Denies nausea or vomiting.   No new bowel or bladder symptoms.  Complains of some issue about the urinary bladder      Intake/Output Summary (Last 24 hours) at 5/28/2024 0803  Last data filed at 5/28/2024 0135  Gross per 24 hour   Intake --   Output 2150 ml   Net -2150 ml         DATA    CBC:   Recent Labs     05/27/24  0816   WBC 7.0   HGB 8.8*         CMP:  Recent Labs     05/26/24  0838 05/27/24  0816    143   K 4.8 4.9    110   CO2 19* 20*   BUN 45* 45*   CREATININE 2.1* 2.1*   CALCIUM 7.9* 8.1*       Lipids:   Lab Results   Component Value Date/Time    CHOL 171 05/30/2023 06:48 AM    HDL 43 05/30/2023 06:48 AM    TRIG 129 05/30/2023 06:48 AM     Glucose:  Recent Labs     05/27/24  2045 05/28/24  0146 05/28/24  0739   POCGLU 129* 192* 158*       QwtybnrskeY2D:  Lab Results   Component Value Date/Time    LABA1C 8.0 05/12/2024 08:26 AM     High Sensitivity TSH:   Lab Results   Component Value Date/Time    TSHHS 3.070 02/02/2024 02:55 AM     Free T3: No results found for: \"FT3\"  Free T4:  Lab Results   Component Value Date/Time    T4FREE 1.55 05/24/2023 01:26 AM       No orders to display        Scheduled Medicines   Medications:    guaiFENesin  600 mg Oral BID    oxyBUTYnin  5 mg Oral TID    hydrALAZINE  100 mg Oral 3 times per day    insulin glargine  5 Units SubCUTAneous Nightly    amLODIPine  10 mg Oral Daily    insulin lispro  5 Units SubCUTAneous TID WC    sodium chloride flush  5-40 mL IntraVENous BID    insulin

## 2024-05-29 ENCOUNTER — APPOINTMENT (OUTPATIENT)
Dept: GENERAL RADIOLOGY | Age: 84
DRG: 948 | End: 2024-05-29
Attending: PHYSICAL MEDICINE & REHABILITATION
Payer: MEDICARE

## 2024-05-29 LAB
GLUCOSE BLD-MCNC: 118 MG/DL (ref 70–99)
GLUCOSE BLD-MCNC: 125 MG/DL (ref 70–99)
GLUCOSE BLD-MCNC: 144 MG/DL (ref 70–99)
GLUCOSE BLD-MCNC: 164 MG/DL (ref 70–99)
GLUCOSE BLD-MCNC: 218 MG/DL (ref 70–99)
GLUCOSE BLD-MCNC: 40 MG/DL (ref 70–99)
GLUCOSE BLD-MCNC: 55 MG/DL (ref 70–99)
GLUCOSE BLD-MCNC: 89 MG/DL (ref 70–99)

## 2024-05-29 PROCEDURE — 6370000000 HC RX 637 (ALT 250 FOR IP): Performed by: PHYSICAL MEDICINE & REHABILITATION

## 2024-05-29 PROCEDURE — 6360000002 HC RX W HCPCS: Performed by: PHYSICIAN ASSISTANT

## 2024-05-29 PROCEDURE — 94761 N-INVAS EAR/PLS OXIMETRY MLT: CPT

## 2024-05-29 PROCEDURE — 71045 X-RAY EXAM CHEST 1 VIEW: CPT

## 2024-05-29 PROCEDURE — 97535 SELF CARE MNGMENT TRAINING: CPT

## 2024-05-29 PROCEDURE — 2580000003 HC RX 258: Performed by: PHYSICIAN ASSISTANT

## 2024-05-29 PROCEDURE — 6370000000 HC RX 637 (ALT 250 FOR IP): Performed by: PHYSICIAN ASSISTANT

## 2024-05-29 PROCEDURE — 99233 SBSQ HOSP IP/OBS HIGH 50: CPT | Performed by: PHYSICAL MEDICINE & REHABILITATION

## 2024-05-29 PROCEDURE — 1280000000 HC REHAB R&B

## 2024-05-29 PROCEDURE — 6370000000 HC RX 637 (ALT 250 FOR IP): Performed by: NURSE PRACTITIONER

## 2024-05-29 PROCEDURE — 97530 THERAPEUTIC ACTIVITIES: CPT

## 2024-05-29 PROCEDURE — 97116 GAIT TRAINING THERAPY: CPT

## 2024-05-29 PROCEDURE — 82962 GLUCOSE BLOOD TEST: CPT

## 2024-05-29 PROCEDURE — 2580000003 HC RX 258: Performed by: PHYSICAL MEDICINE & REHABILITATION

## 2024-05-29 PROCEDURE — 6370000000 HC RX 637 (ALT 250 FOR IP): Performed by: INTERNAL MEDICINE

## 2024-05-29 RX ORDER — BENZONATATE 100 MG/1
100 CAPSULE ORAL 3 TIMES DAILY PRN
Status: DISCONTINUED | OUTPATIENT
Start: 2024-05-29 | End: 2024-05-30 | Stop reason: HOSPADM

## 2024-05-29 RX ORDER — FUROSEMIDE 20 MG/1
20 TABLET ORAL DAILY
Status: COMPLETED | OUTPATIENT
Start: 2024-05-29 | End: 2024-05-30

## 2024-05-29 RX ADMIN — HYDRALAZINE HYDROCHLORIDE 100 MG: 50 TABLET ORAL at 21:37

## 2024-05-29 RX ADMIN — CARVEDILOL 25 MG: 25 TABLET, FILM COATED ORAL at 08:42

## 2024-05-29 RX ADMIN — FUROSEMIDE 20 MG: 20 TABLET ORAL at 21:37

## 2024-05-29 RX ADMIN — ATORVASTATIN CALCIUM 20 MG: 10 TABLET, FILM COATED ORAL at 20:29

## 2024-05-29 RX ADMIN — OXYBUTYNIN CHLORIDE 5 MG: 5 TABLET ORAL at 08:42

## 2024-05-29 RX ADMIN — BENZONATATE 100 MG: 100 CAPSULE ORAL at 20:28

## 2024-05-29 RX ADMIN — CARVEDILOL 25 MG: 25 TABLET, FILM COATED ORAL at 20:29

## 2024-05-29 RX ADMIN — Medication 16 G: at 16:33

## 2024-05-29 RX ADMIN — SODIUM CHLORIDE, PRESERVATIVE FREE 10 ML: 5 INJECTION INTRAVENOUS at 20:30

## 2024-05-29 RX ADMIN — ENOXAPARIN SODIUM 30 MG: 100 INJECTION SUBCUTANEOUS at 08:41

## 2024-05-29 RX ADMIN — PANTOPRAZOLE SODIUM 40 MG: 40 TABLET, DELAYED RELEASE ORAL at 05:27

## 2024-05-29 RX ADMIN — Medication 16 G: at 17:00

## 2024-05-29 RX ADMIN — GUAIFENESIN 600 MG: 600 TABLET, EXTENDED RELEASE ORAL at 20:28

## 2024-05-29 RX ADMIN — INSULIN LISPRO 1 UNITS: 100 INJECTION, SOLUTION INTRAVENOUS; SUBCUTANEOUS at 12:15

## 2024-05-29 RX ADMIN — OXYBUTYNIN CHLORIDE 5 MG: 5 TABLET ORAL at 16:53

## 2024-05-29 RX ADMIN — OXYBUTYNIN CHLORIDE 5 MG: 5 TABLET ORAL at 20:29

## 2024-05-29 RX ADMIN — HYDRALAZINE HYDROCHLORIDE 100 MG: 50 TABLET ORAL at 16:54

## 2024-05-29 RX ADMIN — AMLODIPINE BESYLATE 10 MG: 10 TABLET ORAL at 08:42

## 2024-05-29 RX ADMIN — MEROPENEM 500 MG: 500 INJECTION, POWDER, FOR SOLUTION INTRAVENOUS at 05:31

## 2024-05-29 RX ADMIN — SODIUM CHLORIDE, PRESERVATIVE FREE 10 ML: 5 INJECTION INTRAVENOUS at 08:42

## 2024-05-29 RX ADMIN — FERROUS SULFATE TAB 325 MG (65 MG ELEMENTAL FE) 325 MG: 325 (65 FE) TAB at 08:42

## 2024-05-29 RX ADMIN — SODIUM CHLORIDE 25 ML: 9 INJECTION, SOLUTION INTRAVENOUS at 05:30

## 2024-05-29 RX ADMIN — GUAIFENESIN 600 MG: 600 TABLET, EXTENDED RELEASE ORAL at 08:42

## 2024-05-29 RX ADMIN — HYDRALAZINE HYDROCHLORIDE 100 MG: 50 TABLET ORAL at 05:27

## 2024-05-29 RX ADMIN — INSULIN LISPRO 5 UNITS: 100 INJECTION, SOLUTION INTRAVENOUS; SUBCUTANEOUS at 08:41

## 2024-05-29 RX ADMIN — LEVOTHYROXINE SODIUM 75 MCG: 0.07 TABLET ORAL at 05:27

## 2024-05-29 RX ADMIN — ASPIRIN 81 MG: 81 TABLET, CHEWABLE ORAL at 20:29

## 2024-05-29 RX ADMIN — INSULIN LISPRO 5 UNITS: 100 INJECTION, SOLUTION INTRAVENOUS; SUBCUTANEOUS at 12:15

## 2024-05-29 RX ADMIN — SENNOSIDES 17.2 MG: 8.6 TABLET, FILM COATED ORAL at 08:42

## 2024-05-29 ASSESSMENT — PAIN SCALES - GENERAL: PAINLEVEL_OUTOF10: 0

## 2024-05-29 NOTE — PROGRESS NOTES
Physical Rehabilitation: OCCUPATIONAL THERAPY     [x] daily progress note       [x] discharge       Patient Name:  Greg Easton   :  1940 MRN: [id number]  Room:  [address] [place] [address] Date of Admission: 2024  Rehabilitation Diagnosis:   Syncope and collapse [R55]  Debility [R53.81]       Date 2024       Day of ARU Week:  5   Time IN//745   Individual Tx Minutes 90   Group Tx Minutes    Co-Treat Minutes    Concurrent Tx Minutes    TOTAL Tx Time Mins 90   Variance Time    Variance Reason []   Refusal due to:     []   Medical hold/reason:    []   Illness   []   Off Unit for test/procedure  []   Extra time needed to complete task  []   Therapeutic need  []   Make up mins were attempted but pt unable to complete due to (specify)  []   Other (specify):   Restrictions Restrictions/Precautions: Fall Risk, Contact Precautions         Communication with other providers: [x]   OK to see per nursing:     []   Spoke with team member regarding:      Subjective observations and cognitive status: Patient up in high fowlers watching TV upon approach pleasant and agreeable to therapy however does c/o stomach cramps throughout the night and not sleeping well     Pain level/location:    /10       Location: per patient no pain noted    Discharge recommendations  Anticipated discharge date:   Destination: []home alone   []home alone w assist prn   [x] home w/ family    [] Continuous supervision       []SNF    [] Assisted living     [] Other:   Continued therapy: [x]HHC OT  []OUTPATIENT  OT   [] No Further OT  Equipment needs: None        ADLs:    Eating: Eating  Assistance Needed: Independent  Comment: X  CARE Score: 6  Discharge Goal: Independent       Oral Hygiene: Oral Hygiene  Assistance Needed: Independent  Comment: X  CARE Score: 6  Discharge Goal: Independent    UB/LB Bathing: Shower/Bathe Self  Assistance Needed: Independent  Comment: X  CARE Score: 6  Discharge Goal: Independent    UB Dressing: Upper Body

## 2024-05-29 NOTE — FLOWSHEET NOTE
inappropriate behaviors during interactions with staff.  []   Patient left against medical advice (AMA).    Other ______________________________________________________________________      Assessment:    Treatment/Activity Tolerance:   [x] Tolerated treatment with no adverse effects    [] Patient limited by fatigue  [] Patient limited by pain   [] Patient limited by medical complications:    [] Adverse reaction to Tx:   [] Significant change in status    Safety:       []  bed alarm set    [x]  chair alarm set    []  Pt refused alarms                []  Telesitter activated      [x]  Gait belt used during tx session      [x]Other: After AM session: pt left sitting up in recliner with call light at end of treatment. After PM session: pt left in semi-streeter's position in bed with call light at end of treatment.       Number of Minutes/Billable Intervention  Gait Training 60   Therapeutic Exercise    Neuro Re-Ed    Therapeutic Activity 30   Wheelchair Propulsion    Group    Other:    TOTAL 90         Social History  Social/Functional History  Lives With: Son (\"Home\" (on disability due to motorcycle accident))  Type of Home: House  Home Layout: One level  Home Access: Ramped entrance (wooden ramp with railings)  Bathroom Shower/Tub: Tub/Shower unit  Bathroom Toilet: Standard (equipped with BSC frame)  Bathroom Equipment: Built-in shower seat  Bathroom Accessibility: Walker accessible  Home Equipment: Rollator, Walker - Rolling, Lift chair, Sock aid, Hospital bed, Reacher  Has the patient had two or more falls in the past year or any fall with injury in the past year?: No (1 without significant injury)  ADL Assistance: Needs assistance  Homemaking Responsibilities: Yes  Meal Prep Responsibility: Secondary (does simple meal prep for self)  Laundry Responsibility: Primary  Cleaning Responsibility: No  Bill Paying/Finance Responsibility: No (one of his sons does)  Shopping Responsibility: No  Dependent Care Responsibility:

## 2024-05-29 NOTE — CARE COORDINATION
Outpatient PT order faxed to [facility] outpatient    Case t met with patient in room.  He's looking forward to tomorrow's [place] home.  His son will provide transport.  He's not sure if urology follow up has been scheduled.  Case mgt will call the office tomorrow during business hours to check.  Whiteboard up to date.

## 2024-05-29 NOTE — PATIENT CARE CONFERENCE
ACUTE REHAB TEAM CONFERENCE SUMMARY   [facility]    NAME: Greg Easton  : 1940 ADMIT DATE: 2024    Rehab Admitting Dx:Syncope and collapse [R55]  Debility [R53.81]  Patient Comorbid Conditions:   Active Hospital Problems    Diagnosis Date Noted    Poorly controlled type 2 diabetes mellitus with peripheral neuropathy (HCC) [E11.42, E11.65] 2016     Priority: High    Acute cystitis without hematuria [N30.00] 2024    Acute on chronic urinary retention [R33.9] 2024    Moderate protein-calorie malnutrition (HCC) [E44.0] 2024    Debility [R53.81] 2024    Chronic kidney disease, stage IV (severe) (HCC) [N18.4] 2022     Date: 2024    CASE MANAGEMENT  Current issues/needs regarding patient and family discharge status: none  Patient and family goal:  discharge home with son and outpatient PT    PHYSICAL THERAPY (Updated in QI)        Long Term Goals  Time Frame for Long Term Goals : 12 tx days:  Long Term Goal 1: Pt will complete rolling R/L using bed rail Mod Ind.  Long Term Goal 2: Pt will complete scooting and sup<->sit using bed features with SBA.  Long Term Goal 3: Pt will complete sit<->stand and stand turn transfers with elevated seat height  using 2WW Mod Ind.  Long Term Goal 4: Pt will complete car transfers using 2WW with SBA.  Long Term Goal 5: Pt will ambulate 10 ft over level and uneven surfaces and 50 ft with turns over level surface using 2WW Mod Ind.  Additional Goals?: Yes  Long term goal 6: Pt will ambulate 150 ft over level surface using 2WW with SBA.  Long term goal 7: Pt will ascend/descend curb step using 2WW and 4-5 steps (4\"/6\" step height) using railings with CGA.  Long term goal 8: Pt will complete object retrieval from the floor with 2WW and using reacher Mod Ind.    Impairments/deficits, barriers:    Body Structures, Functions, Activity Limitations Requiring Skilled Therapeutic Intervention: Decreased ADL status,  Physical Therapy Treatment    Patient Name:  Fidelina Darling   MRN:  [id number]    Recommendations:     Discharge Recommendations:  nursing facility, skilled   Discharge Equipment Recommendations:  (TBD by next level of care)   Barriers to discharge: Decreased caregiver support    Assessment:     Fidelina Darling is a 88 y.o. female admitted with a medical diagnosis of Abdominal cramping.  She presents with the following impairments/functional limitations:  weakness, impaired endurance, impaired self care skills, impaired functional mobility, impaired balance, impaired cognition, decreased lower extremity function, decreased upper extremity function, decreased safety awareness, pain, decreased ROM.    Rehab Prognosis: Fair; patient would benefit from acute skilled PT services to address these deficits and reach maximum level of function.    Recent Surgery: Procedure(s) (LRB):  SIGMOIDOSCOPY (N/A)  COLECTOMY, SIGMOID 2 Days Post-Op    Plan:     During this hospitalization, patient to be seen 5 x/week to address the identified rehab impairments via gait training, therapeutic activities, therapeutic exercises, neuromuscular re-education and progress toward the following goals:    Plan of Care Expires:  09/19/22    Subjective     Chief Complaint: Increased pain at GERSON knees as well as abdomen.    Pain/Comfort:  Location 1: abdomen (Unable to verbalize pain number)  Pain Addressed 1: Distraction, Reposition      Objective:     Communicated with NSG prior to session.  Patient found HOB elevated with peripheral IV, NG tube, telemetry, PureWick upon PT entry to room.     General Precautions: Standard, fall   Orthopedic Precautions:N/A   Braces: N/A  Respiratory Status: Room air     Functional Mobility:  Bed Mobility:     Supine to Sit: total assistance  Sit to Supine: total assistance  Static sitting balance was SBA.  Dynamic sitting balance EOB was Marlene with various activities with OT.  Pt also performed BLE therex with limited  Ax terrell d/t increased knee pain.  Pt terrell ~8mins sitting EOB.  Rolling performed GERSON directions d/t pt with (+)BM for perineal care.      AM-PAC 6 CLICK MOBILITY  Turning over in bed (including adjusting bedclothes, sheets and blankets)?: 2  Sitting down on and standing up from a chair with arms (e.g., wheelchair, bedside commode, etc.): 1  Moving from lying on back to sitting on the side of the bed?: 1  Moving to and from a bed to a chair (including a wheelchair)?: 1  Need to walk in hospital room?: 1  Climbing 3-5 steps with a railing?: 1  Basic Mobility Total Score: 7     Patient left with bed in chair position with all lines intact, call button in reach, and NSG present..    GOALS:   Multidisciplinary Problems       Physical Therapy Goals          Problem: Physical Therapy    Goal Priority Disciplines Outcome Goal Variances Interventions   Physical Therapy Goal     PT, PT/OT Unable to Meet, Plan Revised     Description: Goals to be met by: 22     Patient will increase functional independence with mobility by performin. Supine to sit with MInimal Assistance  2. Sit to supine with MInimal Assistance  3. Sit to stand transfer with Minimal Assistance  4. Bed to chair transfer with Minimal Assistance using Rolling Walker  5. Sitting at edge of bed x10 minutes with Stand-by Assistance                         Time Tracking:     PT Received On: 22  PT Start Time: 933     PT Stop Time: 1003  PT Total Time (min): 30 min     Billable Minutes: Therapeutic Activity 30    Treatment Type: Treatment  PT/PTA: PTA     PTA Visit Number: 4     2022

## 2024-05-29 NOTE — DISCHARGE INSTRUCTIONS
Your information:  Name: Greg Easton  : 1940    Your instructions:    Pt will discharge to home with out-patient therapies at   [facility]  [address] [place] [address]  [phone]    A representative will call you to schedule your first visit.  The office already has your physical therapy order.  It is very important that you attend this appointment.;    What to do after you leave the hospital:    Recommended diet: {diet:42673}    Recommended activity: {discharge activity:10168}        The following personal items were collected during your admission and were returned to you:    Belongings  Dental Appliances: Uppers, Lowers  Vision - Corrective Lenses: Eyeglasses  Hearing Aid: None  Clothing: Pants, Pajamas  Jewelry: None  Body Piercings Removed: No  Electronic Devices: Cell Phone,   Weapons (Notify Protective Services/Security): None  Home Medications: None  Valuables Given To: Patient  Provide Name(s) of Who Valuable(s) Were Given To: self    Information obtained by:  By signing below, I understand that if any problems occur once I leave the hospital I am to contact ***.  I understand and acknowledge receipt of the instructions indicated above.

## 2024-05-29 NOTE — PROGRESS NOTES
Progress Note( Dr. Garcia)  5/29/2024  Subjective:   Admit Date: 5/18/2024  PCP: Jacobo Zazueta MD    Admitted For :      Consulted For:  General debility UTI and orthostatic hypotension  Patient admitted to hospital initially on medical floor on May 13, 2024 and was discharged and transferred to rehab unit on May 19, 2024    Interval History: Better control of blood glucose   Blood glucose has been stable for last couple of days    Denies any chest pains,   Denies SOB .   Denies nausea or vomiting.   No new bowel or bladder symptoms.  Complains of some issue about the urinary bladder      Intake/Output Summary (Last 24 hours) at 5/29/2024 0833  Last data filed at 5/29/2024 0527  Gross per 24 hour   Intake 960 ml   Output 1050 ml   Net -90 ml         DATA    CBC:   Recent Labs     05/27/24  0816   WBC 7.0   HGB 8.8*         CMP:  Recent Labs     05/26/24  0838 05/27/24  0816    143   K 4.8 4.9    110   CO2 19* 20*   BUN 45* 45*   CREATININE 2.1* 2.1*   CALCIUM 7.9* 8.1*       Lipids:   Lab Results   Component Value Date/Time    CHOL 171 05/30/2023 06:48 AM    HDL 43 05/30/2023 06:48 AM    TRIG 129 05/30/2023 06:48 AM     Glucose:  Recent Labs     05/28/24  1949 05/29/24  0144 05/29/24  0749   POCGLU 191* 144* 164*       [id number]:  Lab Results   Component Value Date/Time    LABA1C 8.0 05/12/2024 08:26 AM     High Sensitivity TSH:   Lab Results   Component Value Date/Time    TSHHS 3.070 02/02/2024 02:55 AM     Free T3: No results found for: \"FT3\"  Free T4:  Lab Results   Component Value Date/Time    T4FREE 1.55 05/24/2023 01:26 AM       No orders to display        Scheduled Medicines   Medications:    guaiFENesin  600 mg Oral BID    oxyBUTYnin  5 mg Oral TID    hydrALAZINE  100 mg Oral 3 times per day    insulin glargine  5 Units SubCUTAneous Nightly    amLODIPine  10 mg Oral Daily    insulin lispro  5 Units SubCUTAneous TID WC    sodium chloride flush  5-40 mL IntraVENous BID    insulin

## 2024-05-29 NOTE — PROGRESS NOTES
Greg Easton    : 1940  Acct #: [id number]  MRN: [id number]              PM&R Progress Note      Admitting diagnosis: Acute cystitis without hematuria (IGC 16)     Comorbid diagnoses impacting rehabilitation: History of prostate cancer status post RPP   History of ureteral stent  Chronic urinary retention status post suprapubic catheter  Chronic cystitis  Chronic left hydronephrosis  Chronic low back pain  Multilevel lumbar spondylosis with stenosis  Chronic kidney disease stage IV  Diabetes mellitus with hyperglycemia and associated peripheral polyneuropathy  Hypertension  Anemia of chronic disease  Moderate protein calorie malnutrition     Chief complaint: Annoying cough without chest pain or rigors.    Prior (baseline) level of function: Independent.    Current level of function:         Current  IRF-LESTER and Goals:   Occupational Therapy:    Short Term Goals  Time Frame for Short Term Goals: STGs=LTGs :   Long Term Goals  Time Frame for Long Term Goals : 12 days or until d/c.  Long Term Goal 1: Pt will complete self-feeding with IND.  Long Term Goal 2: Pt will complete oral hygiene and grooming tasks with IND.  Long Term Goal 3: Pt will complete total body bathing with AE PRN and Mod I.  Long Term Goal 4: Pt will complete UB dressing with IND.  Long Term Goal 5: Pt will complete LB dressing with AE PRN and Mod I.  Additional Goals?: Yes  Long Term Goal 6: Pt will doff/don footwear with AE PRN and Mod I.  Long Term Goal 7: Pt will complete toileting with Mod I.  Long Term Goal 8: Pt will complete functional transfers (bed, chair, shower, toilet) with DME PRN and Mod I.  Long Term Goal 9: Pt will perform therex/therax to facilitate an increase in strength/endurance with emphasis on dynamic standing balance/tolerance > 8 mins with supervision.  Long Term Goal 10: Pt will perform an IADL with supervision. :                                       Eating: Eating  Assistance Needed: Independent  Comment:

## 2024-05-30 VITALS
BODY MASS INDEX: 28.2 KG/M2 | RESPIRATION RATE: 16 BRPM | DIASTOLIC BLOOD PRESSURE: 56 MMHG | WEIGHT: 186.07 LBS | SYSTOLIC BLOOD PRESSURE: 153 MMHG | HEART RATE: 70 BPM | HEIGHT: 68 IN | OXYGEN SATURATION: 98 % | TEMPERATURE: 98.2 F

## 2024-05-30 LAB
ANION GAP SERPL CALCULATED.3IONS-SCNC: 13 MMOL/L (ref 7–16)
BUN SERPL-MCNC: 49 MG/DL (ref 6–23)
CALCIUM SERPL-MCNC: 7.9 MG/DL (ref 8.3–10.6)
CHLORIDE BLD-SCNC: 107 MMOL/L (ref 99–110)
CO2: 19 MMOL/L (ref 21–32)
CREAT SERPL-MCNC: 2.3 MG/DL (ref 0.9–1.3)
GFR, ESTIMATED: 27 ML/MIN/1.73M2
GLUCOSE BLD-MCNC: 153 MG/DL (ref 70–99)
GLUCOSE BLD-MCNC: 154 MG/DL (ref 70–99)
GLUCOSE BLD-MCNC: 268 MG/DL (ref 70–99)
GLUCOSE SERPL-MCNC: 137 MG/DL (ref 70–99)
HCT VFR BLD CALC: 26.7 % (ref 42–52)
HEMOGLOBIN: 8.2 GM/DL (ref 13.5–18)
MCH RBC QN AUTO: 29.9 PG (ref 27–31)
MCHC RBC AUTO-ENTMCNC: 30.7 % (ref 32–36)
MCV RBC AUTO: 97.4 FL (ref 78–100)
PDW BLD-RTO: 15.3 % (ref 11.7–14.9)
PLATELET # BLD: 183 K/CU MM (ref 140–440)
PMV BLD AUTO: 10.7 FL (ref 7.5–11.1)
POTASSIUM SERPL-SCNC: 4.7 MMOL/L (ref 3.5–5.1)
RBC # BLD: 2.74 M/CU MM (ref 4.6–6.2)
SODIUM BLD-SCNC: 139 MMOL/L (ref 135–145)
WBC # BLD: 6.1 K/CU MM (ref 4–10.5)

## 2024-05-30 PROCEDURE — 82962 GLUCOSE BLOOD TEST: CPT

## 2024-05-30 PROCEDURE — 85027 COMPLETE CBC AUTOMATED: CPT

## 2024-05-30 PROCEDURE — 6370000000 HC RX 637 (ALT 250 FOR IP): Performed by: NURSE PRACTITIONER

## 2024-05-30 PROCEDURE — 99239 HOSP IP/OBS DSCHRG MGMT >30: CPT | Performed by: PHYSICAL MEDICINE & REHABILITATION

## 2024-05-30 PROCEDURE — 6370000000 HC RX 637 (ALT 250 FOR IP): Performed by: PHYSICIAN ASSISTANT

## 2024-05-30 PROCEDURE — 36415 COLL VENOUS BLD VENIPUNCTURE: CPT

## 2024-05-30 PROCEDURE — 94761 N-INVAS EAR/PLS OXIMETRY MLT: CPT

## 2024-05-30 PROCEDURE — 6370000000 HC RX 637 (ALT 250 FOR IP): Performed by: PHYSICAL MEDICINE & REHABILITATION

## 2024-05-30 PROCEDURE — 80048 BASIC METABOLIC PNL TOTAL CA: CPT

## 2024-05-30 RX ORDER — HYDRALAZINE HYDROCHLORIDE 100 MG/1
100 TABLET, FILM COATED ORAL EVERY 8 HOURS SCHEDULED
Qty: 90 TABLET | Refills: 0 | Status: SHIPPED | OUTPATIENT
Start: 2024-05-30

## 2024-05-30 RX ORDER — OXYBUTYNIN CHLORIDE 5 MG/1
5 TABLET ORAL 3 TIMES DAILY
Qty: 30 TABLET | Refills: 0 | Status: SHIPPED | OUTPATIENT
Start: 2024-05-30 | End: 2024-06-09

## 2024-05-30 RX ORDER — INSULIN LISPRO 100 [IU]/ML
0-8 INJECTION, SOLUTION INTRAVENOUS; SUBCUTANEOUS
Status: DISCONTINUED | OUTPATIENT
Start: 2024-05-30 | End: 2024-05-30 | Stop reason: HOSPADM

## 2024-05-30 RX ORDER — GUAIFENESIN 600 MG/1
600 TABLET, EXTENDED RELEASE ORAL 2 TIMES DAILY
Refills: 0 | COMMUNITY
Start: 2024-05-30

## 2024-05-30 RX ORDER — INSULIN GLARGINE 100 [IU]/ML
5 INJECTION, SOLUTION SUBCUTANEOUS NIGHTLY
Qty: 5 ADJUSTABLE DOSE PRE-FILLED PEN SYRINGE | Refills: 3 | Status: SHIPPED | OUTPATIENT
Start: 2024-05-30

## 2024-05-30 RX ADMIN — FERROUS SULFATE TAB 325 MG (65 MG ELEMENTAL FE) 325 MG: 325 (65 FE) TAB at 10:25

## 2024-05-30 RX ADMIN — LEVOTHYROXINE SODIUM 75 MCG: 0.07 TABLET ORAL at 05:22

## 2024-05-30 RX ADMIN — AMLODIPINE BESYLATE 10 MG: 10 TABLET ORAL at 10:21

## 2024-05-30 RX ADMIN — CARVEDILOL 25 MG: 25 TABLET, FILM COATED ORAL at 10:21

## 2024-05-30 RX ADMIN — GUAIFENESIN 600 MG: 600 TABLET, EXTENDED RELEASE ORAL at 10:21

## 2024-05-30 RX ADMIN — SENNOSIDES 17.2 MG: 8.6 TABLET, FILM COATED ORAL at 10:21

## 2024-05-30 RX ADMIN — PANTOPRAZOLE SODIUM 40 MG: 40 TABLET, DELAYED RELEASE ORAL at 05:22

## 2024-05-30 RX ADMIN — OXYBUTYNIN CHLORIDE 5 MG: 5 TABLET ORAL at 10:21

## 2024-05-30 RX ADMIN — FUROSEMIDE 20 MG: 20 TABLET ORAL at 10:21

## 2024-05-30 RX ADMIN — HYDRALAZINE HYDROCHLORIDE 100 MG: 50 TABLET ORAL at 05:22

## 2024-05-30 NOTE — PROGRESS NOTES
PROTIME 15.7 (H) 04/16/2024    PROTIME 15.1 (H) 02/02/2024    PROTIME 12.9 05/22/2023     Lab Results   Component Value Date    CREATININE 2.1 (H) 05/27/2024    BUN 45 (H) 05/27/2024     05/27/2024    K 4.9 05/27/2024     05/27/2024    CO2 20 (L) 05/27/2024     Lab Results   Component Value Date    ALT <5 (L) 05/15/2024    AST 11 (L) 05/15/2024    ALKPHOS 76 05/15/2024    BILITOT 0.1 05/15/2024       Expected length of stay  prior to a supervised level of function for discharge home with a walker and C OT/PT is 5/30/2024    Recommendations:    Functional Impairments -we will transition to an outpatient physical therapy plan of recovery with discharge from rehab tomorrow.  I encourage close follow-up with urology and his primary care doctor (cough).  Recent tolerance has been consistent for his prescribed PT/ minutes 5 out of every 7 days.  With his generalized weakness, poor coordination and sensory deficits, we continue to focus on gait, transfers, ADLs, iADLs, safety awareness, equipment management, medication management, patient/family teaching, pain management.  Caregiver training arranged case management.  Verbal cues and supervision needed for transfers.     ESBL E. coli UTI-in the setting of chronic suprapubic catheter.  His progress continues to be monitored by urology and infectious disease.  Today was the last of the 14-day course of IV meropenem.  His urine seems clear and he has no fever, tachycardia or worsening bladder spasms.  He also shows no signs of colitis or allergy.  Continue monitoring his urine quality on the antibiotic.  Good GI tolerance noted.  Chronic urinary retention-with history of chronic suprapubic catheter.  Suprapubic catheter exchanged per urology 5/13 with plans to exchange every 3 weeks going forward.  Recently he has experienced bladder spasms but the pharmacy states B&O suppositories not available; urology consulted, appreciate assistance-noted

## 2024-05-30 NOTE — PROGRESS NOTES
pamphlets, or other written material from your doctor or pharmacy?\"  [x]  0.  Never  []  1.  Rarely  []  2.  Sometimes  []  3.  Often  []  4.  Always  []  8.  Patient unable to respond    Signs and Symptoms of Delirium  A.  Acute Onset Mental Status Change - Is there evidence of an acute change in mental status from the patient's baseline?  [x] 0.  No  [] 1.  Yes    B.  Inattention - Did the patient have difficulty focusing attention, for example being easily distractible or having difficulty keeping track of what was being said?  [x]  0.  Behavior not present  []  1.  Behavior continuously present, does not fluctuate  []  2.  Behavior present, fluctuates (comes and goes, changes in severity)    C.  Disorganized thinking - Was the patient's thinking disorganized or incoherent (rambling or irrelevant conversation, unclear or illogical flow of ideas, or unpredictable switching from subject to subject)?  [x]  0.  Behavior not present  []  1.  Behavior continuously present, does not fluctuate  []  2.  Behavior present, fluctuates (comes and goes, changes in severity)    D.  Altered level of consciousness - Did the patient have altered level of consciousness as indicated by any of the following criteria?  Vigilant - startled easily to any sound or touch  Lethargic - repeatedly dozed off while being asked questions, but responded to voice or touch  Stuporous - very difficulty to arouse and keep aroused for the interview  Comatose - could not be aroused  [x]  0.  Behavior not present  []  1.  Behavior continuously present, does not fluctuate  []  2.  Behavior present, fluctuates (comes and goes, changes in severity)    Mood    \"Over the last 2 weeks, have you been bothered by any of the following problems?\" 1. Symptom Presence    0 = No  1 = Yes  9 = No Response 2. Symptom Frequency    0 = Never or 1 day  1 = 2-6 days (several days)  2 = 7-11 days (half or more of the days)  3 = 12-14 days (nearly every day)  **Leave blank

## 2024-05-30 NOTE — CARE COORDINATION
Case mgt left  for hospital follow up with Dr Garcia, Dr Mckeon & Dr Nobles.          ARU  Discharge Summary    D/C Date: 5/30/24    Patient discharged to: home with son    Transported by: son    Referrals made to: [facility] outpatient PT    Additional information: no DME needs    Caregiver training: n/a    Discharge BIMS completed:  [x]      IMM:   [x]      Discharge Assessment:    At the time of discharge,  (check all that apply)     [x]   Patient fully participated in the program and made good progress towards established goals.  []   Patient's medical status limited participation and/or progress towards established goals.  []   Patient demonstrated self-limiting behaviors that limited progress/participation towards established goals.  []   Patient did not buy into the program or instruction provided by the rehab staff.  []   Patient demonstrated inappropriate behaviors during interactions with staff.  []   Patient left against medical advice (AMA).

## 2024-05-30 NOTE — PROGRESS NOTES
Progress Note( Dr. Garcia)  5/30/2024  Subjective:   Admit Date: 5/18/2024  PCP: Jacobo Zazueta MD    Admitted For :      Consulted For:  General debility UTI and orthostatic hypotension  Patient admitted to hospital initially on medical floor on May 13, 2024 and was discharged and transferred to rehab unit on May 19, 2024    Interval History: Better control of blood glucose   Blood glucose had an episode of hypoglycemia yesterday afternoon that was treated appropriately    Denies any chest pains,   Denies SOB .   Denies nausea or vomiting.   No new bowel or bladder symptoms.  Complains of some issue about the urinary bladder      Intake/Output Summary (Last 24 hours) at 5/30/2024 0818  Last data filed at 5/30/2024 0623  Gross per 24 hour   Intake 680 ml   Output 750 ml   Net -70 ml         DATA    CBC:   Recent Labs     05/30/24  0635   WBC 6.1   HGB 8.2*         CMP:  Recent Labs     05/30/24  0635      K 4.7      CO2 19*   BUN 49*   CREATININE 2.3*   CALCIUM 7.9*       Lipids:   Lab Results   Component Value Date/Time    CHOL 171 05/30/2023 06:48 AM    HDL 43 05/30/2023 06:48 AM    TRIG 129 05/30/2023 06:48 AM     Glucose:  Recent Labs     05/29/24  1941 05/30/24  0132 05/30/24  0742   POCGLU 118* 154* 153*       [id number]:  Lab Results   Component Value Date/Time    LABA1C 8.0 05/12/2024 08:26 AM     High Sensitivity TSH:   Lab Results   Component Value Date/Time    TSHHS 3.070 02/02/2024 02:55 AM     Free T3: No results found for: \"FT3\"  Free T4:  Lab Results   Component Value Date/Time    T4FREE 1.55 05/24/2023 01:26 AM       XR CHEST PORTABLE   Final Result      1. Mild diffuse airspace disease and interstitial prominence, slightly increased.      Electronically signed by Eric Martinez MD           Scheduled Medicines   Medications:    insulin lispro  0-8 Units SubCUTAneous TID WC    furosemide  20 mg Oral Daily    guaiFENesin  600 mg Oral BID    oxyBUTYnin  5 mg Oral TID

## 2024-05-31 NOTE — DISCHARGE SUMMARY
PROTIME 15.7 (H) 04/16/2024    PROTIME 15.1 (H) 02/02/2024    PROTIME 12.9 05/22/2023     Lab Results   Component Value Date    CREATININE 2.3 (H) 05/30/2024    BUN 49 (H) 05/30/2024     05/30/2024    K 4.7 05/30/2024     05/30/2024    CO2 19 (L) 05/30/2024     Lab Results   Component Value Date    ALT <5 (L) 05/15/2024    AST 11 (L) 05/15/2024    ALKPHOS 76 05/15/2024    BILITOT 0.1 05/15/2024       The patient presented to the ARU with the above history requiring a multidisciplinary treatment plan including close medical supervision by the ARU Medical Director. The patient participated in the prescribed therapy treatment plan with reasonable compliance and progressive tolerance.  Patient had a dry cough developed at the end of his rehab stay.  2 doses of a diuretic were administered.  He felt better by discharge.  Additionally, bladder spasms continue to be an issue.  He seemed to have some relief with Ditropan which was prescribed for a short time following discharge.      At the time of discharge the patient had become safer with adaptive equipment to transfer and toilet with a FWW with the supervision of family/caregivers. The patient was tolerating an oral diet without choking/coughing and was back to their baseline with regards to bowel and bladder control.    Discharge instructions were reviewed with the patient and his son.  The patient is to follow up with the urologist and his PCP in 1 week. No driving.  Discharged home with [facility] PT/OT/RN arranged.      High Risk Drug Classes:  Use and Indication    Is taking: Check if the patient is taking any medications (or has them prescribed) by pharmacological classification, not how it is used, in the following classes  Indication noted: If column 1 is checked, check if there is an indication noted for all meds in the drug class Is taking  (check all that apply) Indication noted (check all that apply)   Antipsychotic [] []   Anticoagulant [x] [x]

## 2024-06-13 ENCOUNTER — HOSPITAL ENCOUNTER (OUTPATIENT)
Dept: PHYSICAL THERAPY | Age: 84
Setting detail: THERAPIES SERIES
Discharge: HOME | End: 2024-06-13

## 2024-06-13 NOTE — PROGRESS NOTES
Outpatient Physical Therapy  [place]           [x] Phone: [phone]   Fax: [phone]  [place]           [] Phone: [phone]   Fax: [phone]        Physical Therapy Daily Treatment Note  Date:  2024    Patient Name:  Greg Easton    :  1940  MRN: [id number]      No show for PT eval on 24.             Electronically signed by:  Umesh Maldonado, PT, DPT, OCS  2024, 7:35 AM    2024 7:35 AM

## 2024-06-24 ENCOUNTER — OFFICE VISIT (OUTPATIENT)
Dept: GASTROENTEROLOGY | Age: 84
End: 2024-06-24
Payer: MEDICARE

## 2024-06-24 VITALS
WEIGHT: 169 LBS | BODY MASS INDEX: 26.53 KG/M2 | HEART RATE: 68 BPM | SYSTOLIC BLOOD PRESSURE: 140 MMHG | DIASTOLIC BLOOD PRESSURE: 60 MMHG | OXYGEN SATURATION: 99 % | HEIGHT: 67 IN

## 2024-06-24 DIAGNOSIS — D64.9 ANEMIA, UNSPECIFIED TYPE: Primary | ICD-10-CM

## 2024-06-24 DIAGNOSIS — K59.09 CHRONIC CONSTIPATION: ICD-10-CM

## 2024-06-24 DIAGNOSIS — Z87.19 HISTORY OF COLITIS: ICD-10-CM

## 2024-06-24 DIAGNOSIS — K21.9 GASTROESOPHAGEAL REFLUX DISEASE WITHOUT ESOPHAGITIS: ICD-10-CM

## 2024-06-24 DIAGNOSIS — Z87.19 HISTORY OF FECAL IMPACTION: ICD-10-CM

## 2024-06-24 PROCEDURE — G8417 CALC BMI ABV UP PARAM F/U: HCPCS | Performed by: NURSE PRACTITIONER

## 2024-06-24 PROCEDURE — G8427 DOCREV CUR MEDS BY ELIG CLIN: HCPCS | Performed by: NURSE PRACTITIONER

## 2024-06-24 PROCEDURE — 3077F SYST BP >= 140 MM HG: CPT | Performed by: NURSE PRACTITIONER

## 2024-06-24 PROCEDURE — 1123F ACP DISCUSS/DSCN MKR DOCD: CPT | Performed by: NURSE PRACTITIONER

## 2024-06-24 PROCEDURE — 1111F DSCHRG MED/CURRENT MED MERGE: CPT | Performed by: NURSE PRACTITIONER

## 2024-06-24 PROCEDURE — 1036F TOBACCO NON-USER: CPT | Performed by: NURSE PRACTITIONER

## 2024-06-24 PROCEDURE — 99215 OFFICE O/P EST HI 40 MIN: CPT | Performed by: NURSE PRACTITIONER

## 2024-06-24 PROCEDURE — 3078F DIAST BP <80 MM HG: CPT | Performed by: NURSE PRACTITIONER

## 2024-06-24 NOTE — PROGRESS NOTES
Greg Easton 83 y.o. male was seen by CAROLYNN Ortiz on 6/24/2024     Wt Readings from Last 3 Encounters:   06/24/24 76.7 kg (169 lb)   06/13/24 80.3 kg (177 lb)   05/27/24 84.4 kg (186 lb 1.1 oz)       HPI  Greg Easton is a pleasant 83 y.o.  male who presents today for follow-up on acid reflux, anemia and chronic constipation.  He was in the [facility] on 5- for syncope and collapse.  He recalled having a blood infusion at that time.  He lives with his son.  He reports feeling better.  He denies NSAID use.  He recalled having colonoscopy in [place] five years ago.  He follows with Dr. Lagunas for anemia and is taking Iron daily.  He has fatigue with no worsening shortness of breath.  His CT of the abdomen/pelvis done on 5- showed progressive mild right hydroureteronephrosis, no change, atrophic left kidney, left double-J ureteral stent, well positioned, no adjacent stones, well-positioned suprapubic catheter within a completely decompressed urinary bladder, no acute fracture.  His lab work on 5- showed RBC 2.74, Hgb 8.2, Hct 26.7, MCV 97.4, and Platelets 183.  His bowels are moving every three days with soft brown formed stools.   He is taking stool softeners once per day.  He is not taking Miralax.  He denies worsening constipation.  Salad helps him move his bowels and he avoids constipating goods.  No diarrhea.  No blood in his stools or melena.  No excess belching or flatulence.  His appetite is fair and weight is stable.  He is eating twice a day.  No nausea or vomiting.  No abdominal pain, bloating or distention.  His heartburn and acid reflux is controlled with Prilosec.  No nocturnal awakenings with acid reflux.  No dysphagia or pain with swallowing.  No family history of stomach cancer.  He recalled that his mother may have had colon cancer at age 66.     ROS  Review of Systems   Constitutional:  Positive for fatigue. Negative for appetite change, chills, 
Detail Level: Zone

## 2024-06-25 RX ORDER — POLYETHYLENE GLYCOL 3350, SODIUM SULFATE ANHYDROUS, SODIUM BICARBONATE, SODIUM CHLORIDE, POTASSIUM CHLORIDE 236; 22.74; 6.74; 5.86; 2.97 G/4L; G/4L; G/4L; G/4L; G/4L
4 POWDER, FOR SOLUTION ORAL ONCE
Qty: 4000 ML | Refills: 0 | Status: SHIPPED | OUTPATIENT
Start: 2024-06-25 | End: 2024-06-25

## 2024-06-27 ENCOUNTER — TELEPHONE (OUTPATIENT)
Dept: GASTROENTEROLOGY | Age: 84
End: 2024-06-27

## 2024-07-02 RX ORDER — OXYBUTYNIN CHLORIDE 5 MG/1
5 TABLET ORAL 3 TIMES DAILY
Qty: 30 TABLET | Refills: 0 | OUTPATIENT
Start: 2024-07-02

## 2024-07-31 ENCOUNTER — APPOINTMENT (OUTPATIENT)
Dept: GENERAL RADIOLOGY | Age: 84
DRG: 698 | End: 2024-07-31
Payer: MEDICARE

## 2024-07-31 ENCOUNTER — APPOINTMENT (OUTPATIENT)
Dept: CT IMAGING | Age: 84
DRG: 698 | End: 2024-07-31
Payer: MEDICARE

## 2024-07-31 ENCOUNTER — HOSPITAL ENCOUNTER (INPATIENT)
Age: 84
LOS: 8 days | Discharge: NURSING, REHAB OR LONG-TERM CARE | DRG: 698 | End: 2024-08-09
Attending: EMERGENCY MEDICINE | Admitting: INTERNAL MEDICINE
Payer: MEDICARE

## 2024-07-31 DIAGNOSIS — R71.0 DROP IN HEMOGLOBIN: ICD-10-CM

## 2024-07-31 DIAGNOSIS — R26.2 IMPAIRED AMBULATION: ICD-10-CM

## 2024-07-31 DIAGNOSIS — R55 SYNCOPE, UNSPECIFIED SYNCOPE TYPE: Primary | ICD-10-CM

## 2024-07-31 DIAGNOSIS — N39.0 COMPLICATED UTI (URINARY TRACT INFECTION): ICD-10-CM
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
## 2024-07-31 DIAGNOSIS — S42.211A FX HUMERAL NECK, RIGHT, CLOSED, INITIAL ENCOUNTER: ICD-10-CM

## 2024-07-31 LAB
ALBUMIN SERPL-MCNC: 3.6 GM/DL (ref 3.4–5)
ALP BLD-CCNC: 85 IU/L (ref 40–129)
ALT SERPL-CCNC: 9 U/L (ref 10–40)
ANION GAP SERPL CALCULATED.3IONS-SCNC: 11 MMOL/L (ref 7–16)
AST SERPL-CCNC: 13 IU/L (ref 15–37)
BASOPHILS ABSOLUTE: 0 K/CU MM
BASOPHILS RELATIVE PERCENT: 0.4 % (ref 0–1)
BILIRUB SERPL-MCNC: 0.4 MG/DL (ref 0–1)
BUN SERPL-MCNC: 43 MG/DL (ref 6–23)
CALCIUM SERPL-MCNC: 9 MG/DL (ref 8.3–10.6)
CHLORIDE BLD-SCNC: 102 MMOL/L (ref 99–110)
CO2: 22 MMOL/L (ref 21–32)
CREAT SERPL-MCNC: 2.4 MG/DL (ref 0.9–1.3)
DIFFERENTIAL TYPE: ABNORMAL
EOSINOPHILS ABSOLUTE: 0.3 K/CU MM
EOSINOPHILS RELATIVE PERCENT: 2.4 % (ref 0–3)
GFR, ESTIMATED: 26 ML/MIN/1.73M2
GLUCOSE SERPL-MCNC: 154 MG/DL (ref 70–99)
HCT VFR BLD CALC: 30.2 % (ref 42–52)
HEMOGLOBIN: 9.4 GM/DL (ref 13.5–18)
IMMATURE NEUTROPHIL %: 0.4 % (ref 0–0.43)
LIPASE: 36 IU/L (ref 13–60)
LYMPHOCYTES ABSOLUTE: 1.5 K/CU MM
LYMPHOCYTES RELATIVE PERCENT: 12.8 % (ref 24–44)
MCH RBC QN AUTO: 30.3 PG (ref 27–31)
MCHC RBC AUTO-ENTMCNC: 31.1 % (ref 32–36)
MCV RBC AUTO: 97.4 FL (ref 78–100)
MONOCYTES ABSOLUTE: 0.9 K/CU MM
MONOCYTES RELATIVE PERCENT: 8 % (ref 0–4)
NEUTROPHILS ABSOLUTE: 8.6 K/CU MM
NEUTROPHILS RELATIVE PERCENT: 76 % (ref 36–66)
NUCLEATED RBC %: 0 %
PDW BLD-RTO: 15.9 % (ref 11.7–14.9)
PLATELET # BLD: 190 K/CU MM (ref 140–440)
PMV BLD AUTO: 11.3 FL (ref 7.5–11.1)
POTASSIUM SERPL-SCNC: 4.9 MMOL/L (ref 3.5–5.1)
RBC # BLD: 3.1 M/CU MM (ref 4.6–6.2)
SODIUM BLD-SCNC: 135 MMOL/L (ref 135–145)
TOTAL IMMATURE NEUTOROPHIL: 0.04 K/CU MM
TOTAL NUCLEATED RBC: 0 K/CU MM
TOTAL PROTEIN: 7.1 GM/DL (ref 6.4–8.2)
TROPONIN, HIGH SENSITIVITY: 51 NG/L (ref 0–22)
WBC # BLD: 11.3 K/CU MM (ref 4–10.5)

## 2024-07-31 PROCEDURE — 80053 COMPREHEN METABOLIC PANEL: CPT

## 2024-07-31 PROCEDURE — 71045 X-RAY EXAM CHEST 1 VIEW: CPT

## 2024-07-31 PROCEDURE — 84484 ASSAY OF TROPONIN QUANT: CPT

## 2024-07-31 PROCEDURE — 93005 ELECTROCARDIOGRAM TRACING: CPT | Performed by: EMERGENCY MEDICINE

## 2024-07-31 PROCEDURE — 96374 THER/PROPH/DIAG INJ IV PUSH: CPT

## 2024-07-31 PROCEDURE — 83690 ASSAY OF LIPASE: CPT

## 2024-07-31 PROCEDURE — 96375 TX/PRO/DX INJ NEW DRUG ADDON: CPT

## 2024-07-31 PROCEDURE — 70450 CT HEAD/BRAIN W/O DYE: CPT

## 2024-07-31 PROCEDURE — 99285 EMERGENCY DEPT VISIT HI MDM: CPT

## 2024-07-31 PROCEDURE — 74176 CT ABD & PELVIS W/O CONTRAST: CPT

## 2024-07-31 PROCEDURE — 85025 COMPLETE CBC W/AUTO DIFF WBC: CPT

## 2024-07-31 PROCEDURE — 72125 CT NECK SPINE W/O DYE: CPT

## 2024-07-31 PROCEDURE — 73030 X-RAY EXAM OF SHOULDER: CPT

## 2024-07-31 RX ORDER — ONDANSETRON 2 MG/ML
4 INJECTION INTRAMUSCULAR; INTRAVENOUS ONCE
Status: COMPLETED | OUTPATIENT
Start: 2024-08-01 | End: 2024-08-01

## 2024-07-31 RX ORDER — MORPHINE SULFATE 4 MG/ML
4 INJECTION, SOLUTION INTRAMUSCULAR; INTRAVENOUS ONCE
Status: COMPLETED | OUTPATIENT
Start: 2024-08-01 | End: 2024-08-01

## 2024-08-01 PROBLEM — E43 SEVERE MALNUTRITION (HCC): Chronic | Status: ACTIVE | Noted: 2024-08-01

## 2024-08-01 PROBLEM — M25.519 SHOULDER PAIN: Status: ACTIVE | Noted: 2024-08-01

## 2024-08-01 LAB
BACTERIA: NEGATIVE /HPF
BILIRUBIN, URINE: NEGATIVE MG/DL
BLOOD, URINE: ABNORMAL
CLARITY, UA: ABNORMAL
COLOR, UA: YELLOW
ESTIMATED AVERAGE GLUCOSE: 174 MG/DL
GLUCOSE BLD-MCNC: 176 MG/DL (ref 70–99)
GLUCOSE BLD-MCNC: 190 MG/DL (ref 70–99)
GLUCOSE BLD-MCNC: 201 MG/DL (ref 70–99)
GLUCOSE BLD-MCNC: 227 MG/DL (ref 70–99)
GLUCOSE BLD-MCNC: 246 MG/DL (ref 70–99)
GLUCOSE URINE: NEGATIVE MG/DL
HBA1C MFR BLD: 7.7 % (ref 4.2–6.3)
KETONES, URINE: NEGATIVE MG/DL
LACTIC ACID, SEPSIS: 1.1 MMOL/L (ref 0.4–2)
LEUKOCYTE ESTERASE, URINE: ABNORMAL
NITRITE URINE, QUANTITATIVE: POSITIVE
PH, URINE: 6 (ref 5–8)
PROTEIN UA: 100 MG/DL
RBC URINE: 11 /HPF (ref 0–3)
SPECIFIC GRAVITY UA: 1.02 (ref 1–1.03)
TRICHOMONAS: ABNORMAL /HPF
TROPONIN, HIGH SENSITIVITY: 47 NG/L (ref 0–22)
UROBILINOGEN, URINE: 0.2 MG/DL (ref 0.2–1)
WBC CLUMP: ABNORMAL /HPF
WBC UA: 1704 /HPF (ref 0–2)

## 2024-08-01 PROCEDURE — 84154 ASSAY OF PSA FREE: CPT

## 2024-08-01 PROCEDURE — 6370000000 HC RX 637 (ALT 250 FOR IP): Performed by: STUDENT IN AN ORGANIZED HEALTH CARE EDUCATION/TRAINING PROGRAM

## 2024-08-01 PROCEDURE — 1200000000 HC SEMI PRIVATE

## 2024-08-01 PROCEDURE — 2580000003 HC RX 258: Performed by: STUDENT IN AN ORGANIZED HEALTH CARE EDUCATION/TRAINING PROGRAM

## 2024-08-01 PROCEDURE — 82962 GLUCOSE BLOOD TEST: CPT

## 2024-08-01 PROCEDURE — 81001 URINALYSIS AUTO W/SCOPE: CPT

## 2024-08-01 PROCEDURE — 87389 HIV-1 AG W/HIV-1&-2 AB AG IA: CPT

## 2024-08-01 PROCEDURE — 6360000002 HC RX W HCPCS: Performed by: EMERGENCY MEDICINE

## 2024-08-01 PROCEDURE — 97166 OT EVAL MOD COMPLEX 45 MIN: CPT

## 2024-08-01 PROCEDURE — 6360000002 HC RX W HCPCS: Performed by: STUDENT IN AN ORGANIZED HEALTH CARE EDUCATION/TRAINING PROGRAM

## 2024-08-01 PROCEDURE — 87186 SC STD MICRODIL/AGAR DIL: CPT

## 2024-08-01 PROCEDURE — 99223 1ST HOSP IP/OBS HIGH 75: CPT | Performed by: ORTHOPAEDIC SURGERY

## 2024-08-01 PROCEDURE — 97530 THERAPEUTIC ACTIVITIES: CPT

## 2024-08-01 PROCEDURE — 36415 COLL VENOUS BLD VENIPUNCTURE: CPT

## 2024-08-01 PROCEDURE — 2580000003 HC RX 258: Performed by: EMERGENCY MEDICINE

## 2024-08-01 PROCEDURE — 87150 DNA/RNA AMPLIFIED PROBE: CPT

## 2024-08-01 PROCEDURE — 87086 URINE CULTURE/COLONY COUNT: CPT

## 2024-08-01 PROCEDURE — 2500000003 HC RX 250 WO HCPCS

## 2024-08-01 PROCEDURE — 83036 HEMOGLOBIN GLYCOSYLATED A1C: CPT

## 2024-08-01 PROCEDURE — 87077 CULTURE AEROBIC IDENTIFY: CPT

## 2024-08-01 PROCEDURE — 84484 ASSAY OF TROPONIN QUANT: CPT

## 2024-08-01 PROCEDURE — 94761 N-INVAS EAR/PLS OXIMETRY MLT: CPT

## 2024-08-01 PROCEDURE — 83605 ASSAY OF LACTIC ACID: CPT

## 2024-08-01 PROCEDURE — 87040 BLOOD CULTURE FOR BACTERIA: CPT

## 2024-08-01 RX ORDER — SODIUM CHLORIDE 0.9 % (FLUSH) 0.9 %
5-40 SYRINGE (ML) INJECTION EVERY 12 HOURS SCHEDULED
Status: DISCONTINUED | OUTPATIENT
Start: 2024-08-01 | End: 2024-08-09 | Stop reason: HOSPADM

## 2024-08-01 RX ORDER — ONDANSETRON 2 MG/ML
4 INJECTION INTRAMUSCULAR; INTRAVENOUS EVERY 6 HOURS PRN
Status: DISCONTINUED | OUTPATIENT
Start: 2024-08-01 | End: 2024-08-09 | Stop reason: HOSPADM

## 2024-08-01 RX ORDER — POTASSIUM CHLORIDE 20 MEQ/1
40 TABLET, EXTENDED RELEASE ORAL PRN
Status: DISCONTINUED | OUTPATIENT
Start: 2024-08-01 | End: 2024-08-09 | Stop reason: HOSPADM

## 2024-08-01 RX ORDER — TORSEMIDE 20 MG/1
20 TABLET ORAL DAILY
Status: DISCONTINUED | OUTPATIENT
Start: 2024-08-01 | End: 2024-08-08

## 2024-08-01 RX ORDER — MAGNESIUM SULFATE IN WATER 40 MG/ML
2000 INJECTION, SOLUTION INTRAVENOUS PRN
Status: DISCONTINUED | OUTPATIENT
Start: 2024-08-01 | End: 2024-08-09 | Stop reason: HOSPADM

## 2024-08-01 RX ORDER — AMLODIPINE BESYLATE 10 MG/1
10 TABLET ORAL DAILY
Status: DISCONTINUED | OUTPATIENT
Start: 2024-08-01 | End: 2024-08-09 | Stop reason: HOSPADM

## 2024-08-01 RX ORDER — ENOXAPARIN SODIUM 100 MG/ML
30 INJECTION SUBCUTANEOUS DAILY
Status: DISCONTINUED | OUTPATIENT
Start: 2024-08-01 | End: 2024-08-02

## 2024-08-01 RX ORDER — ACETAMINOPHEN 325 MG/1
650 TABLET ORAL EVERY 6 HOURS PRN
Status: DISCONTINUED | OUTPATIENT
Start: 2024-08-01 | End: 2024-08-09 | Stop reason: HOSPADM

## 2024-08-01 RX ORDER — INSULIN LISPRO 100 [IU]/ML
0-4 INJECTION, SOLUTION INTRAVENOUS; SUBCUTANEOUS
Status: DISCONTINUED | OUTPATIENT
Start: 2024-08-01 | End: 2024-08-09 | Stop reason: HOSPADM

## 2024-08-01 RX ORDER — SODIUM CHLORIDE 9 MG/ML
INJECTION, SOLUTION INTRAVENOUS PRN
Status: DISCONTINUED | OUTPATIENT
Start: 2024-08-01 | End: 2024-08-09 | Stop reason: HOSPADM

## 2024-08-01 RX ORDER — ONDANSETRON 4 MG/1
4 TABLET, ORALLY DISINTEGRATING ORAL EVERY 8 HOURS PRN
Status: DISCONTINUED | OUTPATIENT
Start: 2024-08-01 | End: 2024-08-09 | Stop reason: HOSPADM

## 2024-08-01 RX ORDER — ACETAMINOPHEN 650 MG/1
650 SUPPOSITORY RECTAL EVERY 6 HOURS PRN
Status: DISCONTINUED | OUTPATIENT
Start: 2024-08-01 | End: 2024-08-09 | Stop reason: HOSPADM

## 2024-08-01 RX ORDER — LEVOTHYROXINE SODIUM 0.07 MG/1
75 TABLET ORAL DAILY
Status: DISCONTINUED | OUTPATIENT
Start: 2024-08-01 | End: 2024-08-09 | Stop reason: HOSPADM

## 2024-08-01 RX ORDER — POLYETHYLENE GLYCOL 3350 17 G/17G
17 POWDER, FOR SOLUTION ORAL DAILY PRN
Status: DISCONTINUED | OUTPATIENT
Start: 2024-08-01 | End: 2024-08-09 | Stop reason: HOSPADM

## 2024-08-01 RX ORDER — INSULIN LISPRO 100 [IU]/ML
0-4 INJECTION, SOLUTION INTRAVENOUS; SUBCUTANEOUS NIGHTLY
Status: DISCONTINUED | OUTPATIENT
Start: 2024-08-01 | End: 2024-08-09 | Stop reason: HOSPADM

## 2024-08-01 RX ORDER — HYDRALAZINE HYDROCHLORIDE 50 MG/1
100 TABLET, FILM COATED ORAL EVERY 8 HOURS SCHEDULED
Status: DISCONTINUED | OUTPATIENT
Start: 2024-08-01 | End: 2024-08-09 | Stop reason: HOSPADM

## 2024-08-01 RX ORDER — POTASSIUM CHLORIDE 7.45 MG/ML
10 INJECTION INTRAVENOUS PRN
Status: DISCONTINUED | OUTPATIENT
Start: 2024-08-01 | End: 2024-08-09 | Stop reason: HOSPADM

## 2024-08-01 RX ORDER — SODIUM CHLORIDE 0.9 % (FLUSH) 0.9 %
5-40 SYRINGE (ML) INJECTION PRN
Status: DISCONTINUED | OUTPATIENT
Start: 2024-08-01 | End: 2024-08-09 | Stop reason: HOSPADM

## 2024-08-01 RX ORDER — DEXTROSE MONOHYDRATE 100 MG/ML
INJECTION, SOLUTION INTRAVENOUS CONTINUOUS PRN
Status: DISCONTINUED | OUTPATIENT
Start: 2024-08-01 | End: 2024-08-09 | Stop reason: HOSPADM

## 2024-08-01 RX ORDER — GLUCAGON 1 MG/ML
1 KIT INJECTION PRN
Status: DISCONTINUED | OUTPATIENT
Start: 2024-08-01 | End: 2024-08-09 | Stop reason: HOSPADM

## 2024-08-01 RX ADMIN — HYDROMORPHONE HYDROCHLORIDE 0.5 MG: 1 INJECTION, SOLUTION INTRAMUSCULAR; INTRAVENOUS; SUBCUTANEOUS at 17:46

## 2024-08-01 RX ADMIN — AMLODIPINE BESYLATE 10 MG: 10 TABLET ORAL at 07:53

## 2024-08-01 RX ADMIN — MICONAZOLE NITRATE: 2 POWDER TOPICAL at 22:35

## 2024-08-01 RX ADMIN — HYDROMORPHONE HYDROCHLORIDE 0.5 MG: 1 INJECTION, SOLUTION INTRAMUSCULAR; INTRAVENOUS; SUBCUTANEOUS at 07:52

## 2024-08-01 RX ADMIN — SODIUM CHLORIDE, PRESERVATIVE FREE 10 ML: 5 INJECTION INTRAVENOUS at 07:55

## 2024-08-01 RX ADMIN — HYDRALAZINE HYDROCHLORIDE 100 MG: 50 TABLET ORAL at 22:35

## 2024-08-01 RX ADMIN — MEROPENEM 1000 MG: 1 INJECTION, POWDER, FOR SOLUTION INTRAVENOUS at 04:18

## 2024-08-01 RX ADMIN — MORPHINE SULFATE 4 MG: 4 INJECTION, SOLUTION INTRAMUSCULAR; INTRAVENOUS at 00:31

## 2024-08-01 RX ADMIN — ONDANSETRON 4 MG: 2 INJECTION INTRAMUSCULAR; INTRAVENOUS at 00:31

## 2024-08-01 RX ADMIN — TORSEMIDE 20 MG: 20 TABLET ORAL at 07:53

## 2024-08-01 RX ADMIN — HYDRALAZINE HYDROCHLORIDE 100 MG: 50 TABLET ORAL at 06:23

## 2024-08-01 RX ADMIN — HYDRALAZINE HYDROCHLORIDE 100 MG: 50 TABLET ORAL at 15:41

## 2024-08-01 RX ADMIN — LEVOTHYROXINE SODIUM 75 MCG: 0.07 TABLET ORAL at 06:22

## 2024-08-01 RX ADMIN — HYDROMORPHONE HYDROCHLORIDE 0.5 MG: 1 INJECTION, SOLUTION INTRAMUSCULAR; INTRAVENOUS; SUBCUTANEOUS at 22:31

## 2024-08-01 RX ADMIN — MEROPENEM 500 MG: 500 INJECTION, POWDER, FOR SOLUTION INTRAVENOUS at 15:48

## 2024-08-01 ASSESSMENT — PAIN DESCRIPTION - PAIN TYPE
TYPE: ACUTE PAIN
TYPE: ACUTE PAIN

## 2024-08-01 ASSESSMENT — PAIN DESCRIPTION - ORIENTATION
ORIENTATION: RIGHT

## 2024-08-01 ASSESSMENT — PAIN DESCRIPTION - DESCRIPTORS
DESCRIPTORS: ACHING;CRUSHING;DISCOMFORT
DESCRIPTORS: ACHING;SHOOTING
DESCRIPTORS: ACHING
DESCRIPTORS: ACHING;SHOOTING

## 2024-08-01 ASSESSMENT — PAIN SCALES - GENERAL
PAINLEVEL_OUTOF10: 0
PAINLEVEL_OUTOF10: 5
PAINLEVEL_OUTOF10: 8
PAINLEVEL_OUTOF10: 8
PAINLEVEL_OUTOF10: 7
PAINLEVEL_OUTOF10: 0
PAINLEVEL_OUTOF10: 7

## 2024-08-01 ASSESSMENT — PAIN DESCRIPTION - LOCATION
LOCATION: ARM;SHOULDER
LOCATION: SHOULDER
LOCATION: ARM;SHOULDER
LOCATION: ARM

## 2024-08-01 ASSESSMENT — PAIN DESCRIPTION - FREQUENCY: FREQUENCY: INTERMITTENT

## 2024-08-01 ASSESSMENT — PAIN DESCRIPTION - ONSET: ONSET: ON-GOING

## 2024-08-01 NOTE — PROGRESS NOTES
Patient seen and examined in the AM. Patient admitted in the AM today by my colleague and I agree with their assessment and plan with the addition to the following:     Rt. Shoulder pain. States he might have passed out at home. Family at bedside.     Pre-syncope vs Syncope 2/2 possible UTI   Rt. Humerus Fracture. Non-operative per ortho. Start on a diet. PT/OT  UTI with chronic indwelling suprapubic cath. Cath changed 2 days prior to presentation by urology. ID consult. Continue meropenem. F/U C/s   Unintentional weight loss. 33lbs in 6 weeks. Hem/onc consult   Splenic granuloma. Hem/onc consult   CKD IV. Stable cr.   Ambulatory dysfunction. Normally ambulates with walker but with Rt. Humerus fracture, will need PT/OT.   IDDM  GERD   Urinary Incontinenece   H/O Prostate cancer   Chronic HFpEF  Hypothyroidism       Electronically signed by Kristopher Peralta MD on 8/1/2024 at 12:35 PM

## 2024-08-01 NOTE — ED NOTES
ED TO INPATIENT SBAR HANDOFF    Patient Name: Greg Easton   :  1940  83 y.o.   Preferred Name    Family/Caregiver Present no   Restraints no   C-SSRS: Risk of Suicide: No Risk  Sitter no   Sepsis Risk Score        Situation  Chief Complaint   Patient presents with    Fall    Shoulder Injury     Brief Description of Patient's Condition: Pt came to the ED after having a syncopal episode. He does have a fracture of his right humeral neck. He has a suprapubic catheter, which is showing signs of a UTI. Pt uses a walker at baseline, so he will need to be admitted for physical therapy.   Mental Status: oriented, alert, coherent, and logical  Arrived from: home    Imaging:   CT HEAD WO CONTRAST   Final Result   No CT evidence of acute intracranial or cervical spine abnormality.      Electronically signed by Jimmy Sánchezva      CT CERVICAL SPINE WO CONTRAST   Final Result   No CT evidence of acute intracranial or cervical spine abnormality.      Electronically signed by Jimmy Hillary      CT ABDOMEN PELVIS WO CONTRAST Additional Contrast? None   Final Result      1. Inflammatory changes about the proximal left ureteral stent. No evidence of   abscess.   2. Small hiatal hernia.   3. 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         Electronically signed by Valente Judge      XR CHEST PORTABLE   Final Result      No acute cardiopulmonary abnormality.      Electronically signed by Rick Whitmore      XR SHOULDER RIGHT (MIN 2 VIEWS)   Final Result      Mild displaced comminuted fracture right humeral neck.      Electronically signed by Rick Whitmore        Abnormal labs:   Abnormal Labs Reviewed   CBC WITH AUTO DIFFERENTIAL - Abnormal; Notable for the following components:       Result Value    WBC 11.3 (*)     RBC 3.10 (*)     Hemoglobin 9.4 (*)     Hematocrit 30.2 (*)     MCHC 31.1 (*)     RDW 15.9 (*)     MPV 11.3 (*)     Neutrophils % 76.0 (*)     Lymphocytes % 12.8 (*)     Monocytes % 8.0 (*)     All other

## 2024-08-01 NOTE — CONSULTS
tablet Take 1 tablet by mouth daily 5/27/23   Giovany Zamarripa MD   carvedilol (COREG) 25 MG tablet Take 1 tablet by mouth 2 times daily    Celso Espinoza MD   senna (SENOKOT) 8.6 MG tablet Take 2 tablets by mouth daily    Celso Espinoza MD   insulin lispro (HUMALOG) 100 UNIT/ML SOLN injection vial Inject 0-8 Units into the skin 3 times daily (with meals)  No Insulin  200-249 2 Units  250-299 4 Units  300-349 6 Units  Over 349 8 Units and notify physician 11/5/22   Reinier Valentin MD   omeprazole (PRILOSEC) 20 MG delayed release capsule Take 2 capsules by mouth daily    Celso Espinoza MD   levothyroxine (SYNTHROID) 75 MCG tablet Take 1 tablet by mouth Daily  Patient not taking: Reported on 8/1/2024    Celso Espinoza MD   aspirin 81 MG chewable tablet Take 1 tablet by mouth daily  Patient taking differently: Take 1 tablet by mouth nightly 4/22/22   Valentino Lopez MD       Allergies     Patient has no known allergies.    Social History   TOBACCO:   reports that he quit smoking about 48 years ago. His smoking use included cigarettes. He has never used smokeless tobacco.  ETOH:   reports no history of alcohol use.  Patient currently lives with family, his son    Family History         Problem Relation Age of Onset    Cancer Mother     Diabetes Father     Heart Disease Father     High Blood Pressure Father     Diabetes Sister     High Blood Pressure Sister     Cancer Brother         prostate, breast    Diabetes Brother     Cancer Maternal Uncle     Diabetes Maternal Grandmother     Stroke Maternal Grandfather        Review of Systems   As in admission H&P, reviewed.  Musculoskeltal as in HPI     Physical Exam:    Vitals: BP (!) 178/72   Pulse 68   Temp 98.1 °F (36.7 °C)   Resp 18   Ht 1.727 m (5' 8\")   Wt 75.8 kg (167 lb)   SpO2 98%   BMI 25.39 kg/m² ,  Body mass index is 25.39 kg/m².   General appearance: alert, appears stated age and cooperative  Skin: Skin color normal.  No rashes or lesions  HEENT: Head: Normocephalic, no lesions, without obvious abnormality.  Neck: supple, symmetrical, trachea midline    Extremities:      Right upper extremity:  There is severe tenderness palpation present throughout the shoulder and upper arm.  Mild soft tissue swelling present throughout the arm.  Skin is intact no lesions or wounds.  Sensation and motor functions intact throughout the upper extremity including median, ulnar, radial nerve distribution.        Neurologic: Mental status: Alert, oriented, thought content appropriate    Labs     CBC:   Recent Labs     07/31/24  2246   WBC 11.3*   HGB 9.4*        BMP:    Recent Labs     07/31/24  2315      K 4.9      CO2 22   BUN 43*   CREATININE 2.4*   GLUCOSE 154*     INR: No results for input(s): \"INR\" in the last 72 hours.      X-ray view   Imaging Review    X-ray images of the right shoulder from yesterday were reviewed by myself and discussed with the patient:     Mild displaced comminuted fracture right humeral neck. There is moderate  adjacent soft tissue swelling acromioclavicular joint is within normal limits.  Acromiohumeral interval is maintained.  Partially imaged cervical fusion.        IMPRESSION:     Mild displaced severely comminuted fracture right humeral neck.       Assessment and Plan     1.  Right comminuted proximal humerus surgical neck fracture    We reviewed the x-rays in detail.  I explained that there is significant comminution at the fracture site however overall appropriate alignment.  We discussed treatment options in detail both operative and nonoperative.    The patient's disease process was reviewed in detail.  Treatment options were discussed including options of both operative and nonoperative treatment.  We discussed surgical intervention including the indications, benefits, risks, expected outcome, and recovery process.  We thoughtfully considered possible surgical intervention at this stage but

## 2024-08-01 NOTE — ED PROVIDER NOTES
Work-up included:  See above  -  Results discussed with patient.    CONSULTS:  IP CONSULT TO ORTHOPEDIC SURGERY  IP CONSULT TO INFECTIOUS DISEASES  IP CONSULT TO HEM/ONC    PROCEDURES:  None performed unless otherwise noted below     Procedures        FINAL IMPRESSION      1. Syncope, unspecified syncope type    2. Fall, initial encounter    3. Fx humeral neck, right, closed, initial encounter    4. Complicated UTI (urinary tract infection)    5. Impaired ambulation          DISPOSITION/PLAN   DISPOSITION Admitted 08/01/2024 02:33:46 AM      PATIENT REFERRED TO:  No follow-up provider specified.    DISCHARGE MEDICATIONS:  Current Discharge Medication List          ED Provider Disposition Time  DISPOSITION Admitted 08/01/2024 02:33:46 AM      Appropriate personal protective equipment was worn during the patient's evaluation.  These included surgical, eye protection, surgical mask or in 95 respirator and gloves.  The patient was also placed in a surgical mask for the prevention of possible spread of respiratory viral illnesses.    The Patient was instructed to read the package inserts with any medication that was prescribed.  Major potential reactions and medication interactions were discussed.  The Patient understands that there are numerous possible adverse reactions not covered.    The patient was also instructed to arrange follow-up with his or her primary care provider for review of any pending labwork or incidental findings on any radiology results that were obtained.  All efforts were made to discuss any incidental findings that require further monitoring.      Controlled Substances Monitoring:          No data to display                (Please note that portions of this note were completed with a voice recognition program.  Efforts were made to edit the dictations but occasionally words are mis-transcribed.)    Masha Rubio MD (electronically signed)  Attending Emergency Physician           [place]  Masha Rubio MD  08/01/24 1098

## 2024-08-01 NOTE — PROGRESS NOTES
Occupational Therapy    [facility] ACUTE CARE OCCUPATIONAL THERAPY EVALUATION    Greg Easton, 1940, [id number]/[id number], 8/1/2024    Discharge Recommendation: Encourage minimal to moderate OT services at discharge, typically 1-2 hours/day, 5 days/week    History:  [place]:  The primary encounter diagnosis was Syncope, unspecified syncope type. Diagnoses of Fall, initial encounter, Fx humeral neck, right, closed, initial encounter, Complicated UTI (urinary tract infection), and Impaired ambulation were also pertinent to this visit.    Subjective:  Patient states: \"Wheelchair.\" (Pt stated when asked what his hobbies are)  Pain: Pt reported 8/10 pain in Rt shoulder at rest  Communication with other providers: RN  Restrictions: General Precautions, Fall Risk, NWB Rt UE with sling, Pocket Telemetry, Hopper, Bed/chair alarm    Home Setup/Prior level of function:  Social/Functional History  Lives With: Son (who is not home during the day)  Type of Home: House  Home Layout: One level  Home Access: Ramped entrance  Bathroom Shower/Tub: Walk-in shower  Bathroom Toilet: Standard  Bathroom Equipment: Grab bars in shower, Shower chair  Bathroom Accessibility: Accessible  Home Equipment: Walker - Rolling, Wheelchair - Manual  ADL Assistance: Independent  Homemaking Assistance: Needs assistance  Homemaking Responsibilities: No (son manages household IADLs)  Ambulation Assistance: Independent (mod I with RW in house, uses wheelchair in community)  Transfer Assistance: Independent  Active : No (son drives)  Occupation: Retired    Examination:  Observation: Supine in bed upon arrival. Lethargic and intermittent confusion but cooperative with evaluation to his tolerance.  Vision: WFL (Glasses)  Hearing: Appears slightly [place]  Vitals: Stable vitals throughout session    Body Systems and functions:  ROM: Lt UE grossly WFL all joints, Rt UE not assessed due to sling  Strength: Lt UE grossly 4- to 4/5 MMT all major muscle  upper body clothing? [x] 1   [] 2   [] 2   [] 3   [] 3    [] 4      5. Taking care of personal grooming such as brushing teeth? [] 1   [] 2    [] 2 [] 3    [x] 3   [] 4      6. Eating meals?   [] 1   [] 2   [] 2   [] 3   [x] 3   [] 4      Raw Score:  11   [24=0% impaired(CH), 23=1-19%(CI), 20-22=20-39%(CJ), 15-19=40-59%(CK), 10-14=60-79%(CL), 7-9=80-99%(CM), 6=100%(CN)]     Treatment:  Therapeutic Activity Training:   Therapeutic activity training was instructed today.  Cues were given for safety, sequence, UE/LE placement, awareness, and balance.    Activities performed today included bed mobility training, sitting balance/tolerance, transfer training, education on role of OT, POC, NWB status Rt UE, wear of sling, importance of OOB activity, d/c planning and recommendations    Safety Measures: Gait belt used, Left in Chair, Alarm in place    Assessment:  Pt is an 83 year old male with a past medical history of Acute kidney failure ([facility]), Acute urinary tract infection, Anemia, Ataxia, Ataxia, Bacteremia, Candidiasis, Chronic combined systolic and diastolic congestive heart failure ([facility]), Chronic kidney disease, Cognitive communication deficit, Diabetes mellitus ([facility]), Extended spectrum beta lactamase (ESBL) resistance, Fusion of spine of cervical region, Gait disturbance, History of prostate cancer, History of tobacco use, Hydronephrosis, Hyperkalemia, Hyperlipidemia, Hypertension, Hypertensive heart disease with CHF (congestive heart failure) ([facility]), Hypotension, Immunodeficiency ([facility]), Metabolic encephalopathy, Muscle weakness, Osteoarthritis, Osteoarthritis, Secondary Hyperaldosteronism ([facility]), Supraventricular tachycardia ([facility]), and Tubulo-interstitial nephritis. Pt admitted following a syncopal event and diagnosed with a Rt humeral neck fracture. Pt undergoing non-operative treatment at this time per ortho. Pt lives at home with his son and reports at baseline he is independent with ADLs and ambulates mod I with

## 2024-08-01 NOTE — PROGRESS NOTES
Patient admitted after a fall with a comminuted right proximal humerus fracture.    Discussed the risks and benefits of operative and nonoperative management in detail with the patient.  I recommended nonoperative management of his fracture with close follow-up to evaluate for appropriate healing.    Remain nonweightbearing right upper extremity.  Continue sling for support.  Recommend physical therapy for mobility.    Follow-up in my office in 2 weeks for repeat x-rays of the right shoulder.

## 2024-08-01 NOTE — CONSULTS
[facility] Tele-Infectious Disease Consult Note      Name:  Greg Easton /Age/Sex: 1940  (83 y.o. male)   MRN & CSN:  [id number] & [id number] Encounter Date/Time: 2024 4:19 PM EDT   Location:  [facility] PCP: Jacobo Zazueta MD     Attending:Kristopher Pearlta MD  Consulting Provider: Randy Rowe MD      Hospital Day: 2    This is a telemedicine consult requested by: Dr. Dr. Peralta  I have Personally reviewed Lab Studies, Imaging, and discussed with Dr. Peralta       Physician Location: [place] from home   Patient Location: [facility]   Assessment and Recommendations   Pt is a 84 yo M whom ID is c/f abx w/ pmh + for Chronic Suprapubic catheter and MRDO E. coli, Hypothyroidism, IDDM, and HTN.         Assessment/Plan:   # Complicated UTI: UA w/ significant pyuria with CT Ap showing inflammatory changes on L-kindey at J-stent and mild on R kidney with no hydronephrosis. Scr baseline.   # Syncopal Episode  #Hx of MDR E. Coli      -Bld Cx NGTD  -Urine Cx NGTD  -Would exchange suprapubic catheter prior to discharge  -C/w Meropenem as await culture and sensitivities    ID will continue to follow  Please page/call with any further questions    History of Present Illness:     Chief Complaint: Shoulder pain    Pt is a 84 yo M whom ID is c/f abx w/ pmh + for Chronic Suprapubic catheter and MRDO E. coli, Hypothyroidism, IDDM, and HTN.     Presented today after a syncopal episode at home that led to a fall. Thought to be 2/2 UTI so was started on Meropenem given history and ID was consulted.             Objective:     Intake/Output Summary (Last 24 hours) at 2024 1619  Last data filed at 2024 0630  Gross per 24 hour   Intake --   Output 300 ml   Net -300 ml      Vitals:   Vitals:    24 0745 24 0822 24 1425 24 1526   BP: (!) 176/69   (!) 163/72   Pulse: 63   80   Resp: 16 15  16   Temp: 97.1 °F (36.2 °C)   98.2 °F (36.8 °C)   TempSrc: Oral    None FINDINGS: No areas of abnormal attenuation are seen in the brain. There is no CT evidence of acute hemorrhage or infarction. The ventricles are normal in size. There are no extra-axial fluid collections. No masses are seen. The sinuses are clear. There are no bony lesions. Cervical spine is intact. Prominent multilevel degenerative changes with associated bilateral facet and uncovertebral joint arthropathy. Extensive posterior spinal fusion hardware in the cervical spine is intact without evidence of loosening or fracture. Near complete opacification of the right maxillary sinus.     No CT evidence of acute intracranial or cervical spine abnormality. Electronically signed by Jimmy Thornton    CT CERVICAL SPINE WO CONTRAST    Result Date: 8/1/2024  Head CT, cervical spine CT INDICATION: Trauma TECHNIQUE: Multiple 2D axial images obtained through the brain without intravenous contrast.  Radiation dose reduction techniques were used for this study:  All CT scans performed at this facility use one or all of the following: Automated exposure control, adjustment of the mA and/or kVp according to patient's size, iterative reconstruction. COMPARISON: None FINDINGS: No areas of abnormal attenuation are seen in the brain. There is no CT evidence of acute hemorrhage or infarction. The ventricles are normal in size. There are no extra-axial fluid collections. No masses are seen. The sinuses are clear. There are no bony lesions. Cervical spine is intact. Prominent multilevel degenerative changes with associated bilateral facet and uncovertebral joint arthropathy. Extensive posterior spinal fusion hardware in the cervical spine is intact without evidence of loosening or fracture. Near complete opacification of the right maxillary sinus.     No CT evidence of acute intracranial or cervical spine abnormality. Electronically signed by Jimmy Tohrnton      CBC:   Recent Labs     07/31/24  2246   WBC 11.3*   HGB 9.4*

## 2024-08-01 NOTE — H&P
V2.0  History and Physical      Name:  Greg Easton /Age/Sex: 1940  (83 y.o. male)   MRN & CSN:  [id number] & [id number] Encounter Date/Time: 2024 5:03 AM EDT   Location:  [address] [place] [address] PCP: Jacobo Zazueta MD       Hospital Day: 2    Assessment and Plan:   Greg Easton is a 83 y.o. male with a pmh of chronic diastolic heart failure with preserved ejection fraction hypertension hyperlipidemia insulin-dependent diabetes mellitus type 2 urinary incontinence who presents with Shoulder pain    Hospital Problems             Last Modified POA    * (Principal) Shoulder pain 2024 Yes       Presyncope/syncope in the setting of UTI started patient on meropenem based on previous history of E. coli MDRO.  Chronic indwelling catheter has recently been changed.  Right shoulder pain with 2 humerus fracture orthopedic consulted PT OT n.p.o. until cleared by orthopedic Lovenox for any prophylaxis  Chronic indwelling suprapubic catheter Hopper was changed 2 days ago outpatient by urology  Insulin-dependent mellitus type II start the patient on Lantus and sliding scale insulin monitor for hypoglycemia  Benign essential hypertension on carvedilol and hydralazine at home as well as amlodipine  Urinary incontinence on oxybutynin  Chronic GERD on PPI  Hypothyroidism on levothyroxine  Chronic diastolic heart failure with preserved ejection fraction continue with torsemide  Hypothyroidism on levothyroxine       Medical Decision Making:  The following items were considered in medical decision making:  Discussion of patient care with other providers  Reviewed clinical lab tests if any  Reviewed radiology tests if any  Reviewed other diagnostic tests/interventions  Independent review of radiologic images if any  Microbiology cultures and other micro tests if any    Estimated time spent for medical decision-making encompassing complexity of the case, history taking, medication review, physical examination, communication  size. There are no extra-axial fluid collections. No masses are seen. The sinuses are clear. There are no bony lesions. Cervical spine is intact. Prominent multilevel degenerative changes with associated bilateral facet and uncovertebral joint arthropathy. Extensive posterior spinal fusion hardware in the cervical spine is intact without evidence of loosening or fracture. Near complete opacification of the right maxillary sinus.     No CT evidence of acute intracranial or cervical spine abnormality. Electronically signed by Jimmy Thornton    CT CERVICAL SPINE WO CONTRAST    Result Date: 8/1/2024  Head CT, cervical spine CT INDICATION: Trauma TECHNIQUE: Multiple 2D axial images obtained through the brain without intravenous contrast.  Radiation dose reduction techniques were used for this study:  All CT scans performed at this facility use one or all of the following: Automated exposure control, adjustment of the mA and/or kVp according to patient's size, iterative reconstruction. COMPARISON: None FINDINGS: No areas of abnormal attenuation are seen in the brain. There is no CT evidence of acute hemorrhage or infarction. The ventricles are normal in size. There are no extra-axial fluid collections. No masses are seen. The sinuses are clear. There are no bony lesions. Cervical spine is intact. Prominent multilevel degenerative changes with associated bilateral facet and uncovertebral joint arthropathy. Extensive posterior spinal fusion hardware in the cervical spine is intact without evidence of loosening or fracture. Near complete opacification of the right maxillary sinus.     No CT evidence of acute intracranial or cervical spine abnormality. Electronically signed by Jimmy Thornton      Personally reviewed Lab Studies, Imaging    Electronically signed by Eduard Rizo MD on 8/1/2024 at 5:03 AM

## 2024-08-01 NOTE — PROGRESS NOTES
4 Eyes Skin Assessment     NAME:  Greg Easton  YOB: 1940  MEDICAL RECORD NUMBER:  [id number]    The patient is being assessed for  Admission    I agree that at least one RN has performed a thorough Head to Toe Skin Assessment on the patient. ALL assessment sites listed below have been assessed.      Areas assessed by both nurses:    Head, Face, Ears, Shoulders, Back, Chest, Arms, Elbows, Hands, Sacrum. Buttock, Coccyx, Ischium, Legs. Feet and Heels, and Under Medical Devices         Does the Patient have a Wound? No noted wound(s)     Has redness on the groin area   Judd Prevention initiated by RN: No  Wound Care Orders initiated by RN: No    Pressure Injury (Stage 3,4, Unstageable, DTI, NWPT, and Complex wounds) if present, place Wound referral order by RN under : No    New Ostomies, if present place, Ostomy referral order under : No     Nurse 1 eSignature: Electronically signed by Abdi James RN on 8/1/24 at 4:03 AM EDT    **SHARE this note so that the co-signing nurse can place an eSignature**    Nurse 2 eSignature: Electronically signed by Edilma Travis LPN on 8/1/24 at 4:51 AM EDT

## 2024-08-01 NOTE — PROGRESS NOTES
Comprehensive Nutrition Assessment    Type and Reason for Visit:  Initial, Positive Nutrition Screen (MST 5)    Nutrition Recommendations/Plan:   Begin diabetic oral nutrition supplement TID   Liberalize to Carb Controlled 5 Diet, add soft and bite sized Diet   Refer to OT for feeding assistance   Please document all PO intakes in I/O   Encourage proper hydration/fluids intake     Malnutrition Assessment:  Malnutrition Status:  Severe malnutrition (08/01/24 6618)    Context:  Chronic Illness     Findings of the 6 clinical characteristics of malnutrition:  Energy Intake:  75% or less estimated energy requirements for 1 month or longer  Weight Loss:  Greater than 10% over 6 months (18% in 8 months)       Nutrition Assessment:    Admitted with shoulder pain. XR shoulder shows Mild displaced comminuted fracture right humeral neck. PMH: DMII, OA, HTN, CKD4, malnutrition. Currently on low phosphorus, carb controlled 4 diet? Pt was NPO awaiting for possible surgery, Ortho notes conservative treatment with sling and PT/OT. Diet advanced at this time, pt did not touch meal. Pt is R hand dominant, will need to learn to use other arm for feeding, agreed to start soft and bite sized diet. Son at bedside reports pt had been declining more in appetite over the past month or 4-6 weeks. Son believes pt has lost 30# in the last month. Significant 18% wt loss in 8 months per chart. Pt meets criteria for malnutrition. Did not perform NFPE due to pt's pain and sling. Provided high calorie, high protein medical nutrition therapy handouts and coupons to family. Agreed to start diabetic supplement. Follow as high nutrition risk.    Nutrition Related Findings:    POCT 176-246 Wound Type: None       Current Nutrition Intake & Therapies:    Average Meal Intake: 0%NPO until visit.   Average Supplements Intake: None Ordered  ADULT DIET; Regular; 4 carb choices (60 gm/meal); Low Phosphorus (Less than 1000 mg)    Anthropometric

## 2024-08-01 NOTE — DISCHARGE INSTR - DIET
226   Fiber, total dietary g 35   Sugars, total g 77   Minerals   Calcium, Ca mg 1466   Iron, Fe mg 14   Sodium, Na mg 3256   Vitamins   Vitamin C, total ascorbic acid mg 80   Vitamin A, IU IU 07116   Vitamin D    Lipids   Fatty acids, total saturated g 37   Fatty acids, total monounsaturated g 52   Fatty acids, total polyunsaturated g 30   Cholesterol mg 549        High-Calorie, High-Protein Sample 1-Day Menu View Nutrient Info  Update  Delete  Breakfast 1 egg, scrambled  1 ounce cheddar cheese  1 English muffin, whole wheat  1 tablespoon margarine  1 tablespoon jam  ½ cup orange juice, fortified with calcium and vitamin D   Morning Snack 1 tablespoon peanut butter  1 banana  1 cup 1% milk   Lunch Tuna salad sandwich made with: 2 slices bread, whole wheat  3 ounces tuna mixed with:  1 tablespoon mayonnaise  ½ cup pudding   Afternoon Snack ¼ cup hummus  ½ cup carrots  1 radha   Evening Meal Enchilada casserole made with: 2 corn tortillas  3 ounces ground beef, cooked  ½ cup black beans, cooked  ½ cup corn, cooked  1 ounce grated cheddar cheese  ¼ cup enchilada sauce  ½ avocado, sliced, topping for enchilada  1 tablespoon sour cream, topping for enchilada  Salad: ½ cup lettuce, shredded  ½ cup tomatoes, chopped, for salad  1 tablespoon olive oil and vinegar dressing, for salad   Evening Snack ¾ cup Greek yogurt  ½ cup blueberries  ½ cup granola   Daily Sum  Nutrient Unit Value   Macronutrients   Energy kcal 3013   Energy kJ 63926   Protein g 144   Total lipid (fat) g 130   Carbohydrate, by difference g 336   Fiber, total dietary g 47   Sugars, total g 110   Minerals   Calcium, Ca mg 1882   Iron, Fe mg 19   Sodium, Na mg 2823   Vitamins   Vitamin C, total ascorbic acid mg 95   Vitamin A, IU IU 21857   Vitamin D    Lipids   Fatty acids, total saturated g 38   Fatty acids, total monounsaturated g 51   Fatty acids, total polyunsaturated g 30   Cholesterol mg 520        High-Calorie, High-Protein Vegan Sample

## 2024-08-02 ENCOUNTER — APPOINTMENT (OUTPATIENT)
Dept: CT IMAGING | Age: 84
DRG: 698 | End: 2024-08-02
Payer: MEDICARE

## 2024-08-02 LAB
ANION GAP SERPL CALCULATED.3IONS-SCNC: 12 MMOL/L (ref 7–16)
BASOPHILS ABSOLUTE: 0 K/CU MM
BASOPHILS RELATIVE PERCENT: 0.4 % (ref 0–1)
BUN SERPL-MCNC: 55 MG/DL (ref 6–23)
CALCIUM SERPL-MCNC: 8.8 MG/DL (ref 8.3–10.6)
CHLORIDE BLD-SCNC: 100 MMOL/L (ref 99–110)
CO2: 23 MMOL/L (ref 21–32)
CREAT SERPL-MCNC: 3.1 MG/DL (ref 0.9–1.3)
DIFFERENTIAL TYPE: ABNORMAL
EKG ATRIAL RATE: 66 BPM
EKG DIAGNOSIS: NORMAL
EKG P AXIS: 52 DEGREES
EKG P-R INTERVAL: 216 MS
EKG Q-T INTERVAL: 388 MS
EKG QRS DURATION: 76 MS
EKG QTC CALCULATION (BAZETT): 406 MS
EKG R AXIS: -11 DEGREES
EKG T AXIS: 8 DEGREES
EKG VENTRICULAR RATE: 66 BPM
EOSINOPHILS ABSOLUTE: 0.6 K/CU MM
EOSINOPHILS RELATIVE PERCENT: 8 % (ref 0–3)
FERRITIN: 905 NG/ML (ref 30–400)
FOLATE SERPL-MCNC: 14 NG/ML (ref 3.1–17.5)
GFR, ESTIMATED: 19 ML/MIN/1.73M2
GLUCOSE BLD-MCNC: 182 MG/DL (ref 70–99)
GLUCOSE BLD-MCNC: 212 MG/DL (ref 70–99)
GLUCOSE BLD-MCNC: 270 MG/DL (ref 70–99)
GLUCOSE BLD-MCNC: 279 MG/DL (ref 70–99)
GLUCOSE SERPL-MCNC: 267 MG/DL (ref 70–99)
HCT VFR BLD CALC: 25.3 % (ref 42–52)
HEMOGLOBIN: 8.1 GM/DL (ref 13.5–18)
HIV 1+2 AB+HIV1P24 AG SERPLBLD IA.RAPID: NON REACTIVE
IMMATURE NEUTROPHIL %: 0.3 % (ref 0–0.43)
IRON: 14 UG/DL (ref 59–158)
LYMPHOCYTES ABSOLUTE: 1 K/CU MM
LYMPHOCYTES RELATIVE PERCENT: 13.9 % (ref 24–44)
MCH RBC QN AUTO: 30.9 PG (ref 27–31)
MCHC RBC AUTO-ENTMCNC: 32 % (ref 32–36)
MCV RBC AUTO: 96.6 FL (ref 78–100)
MONOCYTES ABSOLUTE: 0.8 K/CU MM
MONOCYTES RELATIVE PERCENT: 11 % (ref 0–4)
NEUTROPHILS ABSOLUTE: 5 K/CU MM
NEUTROPHILS RELATIVE PERCENT: 66.4 % (ref 36–66)
NUCLEATED RBC %: 0 %
PCT TRANSFERRIN: 9 % (ref 10–44)
PDW BLD-RTO: 15.4 % (ref 11.7–14.9)
PLATELET # BLD: 180 K/CU MM (ref 140–440)
PMV BLD AUTO: 10.9 FL (ref 7.5–11.1)
POTASSIUM SERPL-SCNC: 5.5 MMOL/L (ref 3.5–5.1)
RBC # BLD: 2.62 M/CU MM (ref 4.6–6.2)
SODIUM BLD-SCNC: 135 MMOL/L (ref 135–145)
TOTAL IMMATURE NEUTOROPHIL: 0.02 K/CU MM
TOTAL IRON BINDING CAPACITY: 161 UG/DL (ref 250–450)
TOTAL NUCLEATED RBC: 0 K/CU MM
UNSATURATED IRON BINDING CAPACITY: 147 UG/DL (ref 110–370)
VITAMIN B-12: 631.3 PG/ML (ref 211–911)
WBC # BLD: 7.5 K/CU MM (ref 4–10.5)

## 2024-08-02 PROCEDURE — 51798 US URINE CAPACITY MEASURE: CPT

## 2024-08-02 PROCEDURE — 94761 N-INVAS EAR/PLS OXIMETRY MLT: CPT

## 2024-08-02 PROCEDURE — 6370000000 HC RX 637 (ALT 250 FOR IP): Performed by: STUDENT IN AN ORGANIZED HEALTH CARE EDUCATION/TRAINING PROGRAM

## 2024-08-02 PROCEDURE — 83540 ASSAY OF IRON: CPT

## 2024-08-02 PROCEDURE — 82728 ASSAY OF FERRITIN: CPT

## 2024-08-02 PROCEDURE — 6360000002 HC RX W HCPCS: Performed by: STUDENT IN AN ORGANIZED HEALTH CARE EDUCATION/TRAINING PROGRAM

## 2024-08-02 PROCEDURE — 1200000000 HC SEMI PRIVATE

## 2024-08-02 PROCEDURE — 36415 COLL VENOUS BLD VENIPUNCTURE: CPT

## 2024-08-02 PROCEDURE — 85025 COMPLETE CBC W/AUTO DIFF WBC: CPT

## 2024-08-02 PROCEDURE — 82607 VITAMIN B-12: CPT

## 2024-08-02 PROCEDURE — 99223 1ST HOSP IP/OBS HIGH 75: CPT | Performed by: INTERNAL MEDICINE

## 2024-08-02 PROCEDURE — 6370000000 HC RX 637 (ALT 250 FOR IP): Performed by: INTERNAL MEDICINE

## 2024-08-02 PROCEDURE — 93010 ELECTROCARDIOGRAM REPORT: CPT | Performed by: INTERNAL MEDICINE

## 2024-08-02 PROCEDURE — 84300 ASSAY OF URINE SODIUM: CPT

## 2024-08-02 PROCEDURE — 71250 CT THORAX DX C-: CPT

## 2024-08-02 PROCEDURE — 84156 ASSAY OF PROTEIN URINE: CPT

## 2024-08-02 PROCEDURE — 82570 ASSAY OF URINE CREATININE: CPT

## 2024-08-02 PROCEDURE — 82962 GLUCOSE BLOOD TEST: CPT

## 2024-08-02 PROCEDURE — APPNB60 APP NON BILLABLE TIME 46-60 MINS: Performed by: PHYSICIAN ASSISTANT

## 2024-08-02 PROCEDURE — 83550 IRON BINDING TEST: CPT

## 2024-08-02 PROCEDURE — 82550 ASSAY OF CK (CPK): CPT

## 2024-08-02 PROCEDURE — 2580000003 HC RX 258: Performed by: STUDENT IN AN ORGANIZED HEALTH CARE EDUCATION/TRAINING PROGRAM

## 2024-08-02 PROCEDURE — 82746 ASSAY OF FOLIC ACID SERUM: CPT

## 2024-08-02 PROCEDURE — 80048 BASIC METABOLIC PNL TOTAL CA: CPT

## 2024-08-02 RX ORDER — HEPARIN SODIUM 5000 [USP'U]/ML
5000 INJECTION, SOLUTION INTRAVENOUS; SUBCUTANEOUS EVERY 8 HOURS SCHEDULED
Status: DISCONTINUED | OUTPATIENT
Start: 2024-08-03 | End: 2024-08-09 | Stop reason: HOSPADM

## 2024-08-02 RX ADMIN — MICONAZOLE NITRATE: 2 POWDER TOPICAL at 10:53

## 2024-08-02 RX ADMIN — AMLODIPINE BESYLATE 10 MG: 10 TABLET ORAL at 08:55

## 2024-08-02 RX ADMIN — MEROPENEM 500 MG: 500 INJECTION, POWDER, FOR SOLUTION INTRAVENOUS at 04:00

## 2024-08-02 RX ADMIN — HYDRALAZINE HYDROCHLORIDE 100 MG: 50 TABLET ORAL at 14:31

## 2024-08-02 RX ADMIN — SODIUM CHLORIDE, PRESERVATIVE FREE 10 ML: 5 INJECTION INTRAVENOUS at 08:55

## 2024-08-02 RX ADMIN — MICONAZOLE NITRATE: 2 POWDER TOPICAL at 21:43

## 2024-08-02 RX ADMIN — INSULIN LISPRO 2 UNITS: 100 INJECTION, SOLUTION INTRAVENOUS; SUBCUTANEOUS at 17:55

## 2024-08-02 RX ADMIN — SODIUM ZIRCONIUM CYCLOSILICATE 10 G: 10 POWDER, FOR SUSPENSION ORAL at 14:31

## 2024-08-02 RX ADMIN — INSULIN LISPRO 1 UNITS: 100 INJECTION, SOLUTION INTRAVENOUS; SUBCUTANEOUS at 08:54

## 2024-08-02 RX ADMIN — SODIUM ZIRCONIUM CYCLOSILICATE 10 G: 10 POWDER, FOR SUSPENSION ORAL at 21:41

## 2024-08-02 RX ADMIN — MEROPENEM 500 MG: 500 INJECTION, POWDER, FOR SOLUTION INTRAVENOUS at 14:30

## 2024-08-02 RX ADMIN — HYDROMORPHONE HYDROCHLORIDE 0.5 MG: 1 INJECTION, SOLUTION INTRAMUSCULAR; INTRAVENOUS; SUBCUTANEOUS at 16:37

## 2024-08-02 RX ADMIN — HYDRALAZINE HYDROCHLORIDE 100 MG: 50 TABLET ORAL at 21:43

## 2024-08-02 RX ADMIN — TORSEMIDE 20 MG: 20 TABLET ORAL at 08:55

## 2024-08-02 RX ADMIN — MICONAZOLE NITRATE: 2 POWDER TOPICAL at 08:56

## 2024-08-02 RX ADMIN — HYDROMORPHONE HYDROCHLORIDE 0.5 MG: 1 INJECTION, SOLUTION INTRAMUSCULAR; INTRAVENOUS; SUBCUTANEOUS at 06:33

## 2024-08-02 RX ADMIN — SODIUM CHLORIDE, PRESERVATIVE FREE 10 ML: 5 INJECTION INTRAVENOUS at 21:42

## 2024-08-02 RX ADMIN — SODIUM CHLORIDE: 9 INJECTION, SOLUTION INTRAVENOUS at 14:29

## 2024-08-02 RX ADMIN — INSULIN LISPRO 2 UNITS: 100 INJECTION, SOLUTION INTRAVENOUS; SUBCUTANEOUS at 14:31

## 2024-08-02 RX ADMIN — HYDRALAZINE HYDROCHLORIDE 100 MG: 50 TABLET ORAL at 06:23

## 2024-08-02 RX ADMIN — LEVOTHYROXINE SODIUM 75 MCG: 0.07 TABLET ORAL at 06:23

## 2024-08-02 RX ADMIN — SODIUM CHLORIDE: 9 INJECTION, SOLUTION INTRAVENOUS at 03:59

## 2024-08-02 RX ADMIN — ENOXAPARIN SODIUM 30 MG: 100 INJECTION SUBCUTANEOUS at 08:55

## 2024-08-02 ASSESSMENT — PAIN SCALES - GENERAL
PAINLEVEL_OUTOF10: 0
PAINLEVEL_OUTOF10: 7
PAINLEVEL_OUTOF10: 0
PAINLEVEL_OUTOF10: 0
PAINLEVEL_OUTOF10: 7
PAINLEVEL_OUTOF10: 7
PAINLEVEL_OUTOF10: 0
PAINLEVEL_OUTOF10: 0

## 2024-08-02 ASSESSMENT — PAIN DESCRIPTION - PAIN TYPE: TYPE: ACUTE PAIN

## 2024-08-02 ASSESSMENT — PAIN DESCRIPTION - ONSET: ONSET: ON-GOING

## 2024-08-02 ASSESSMENT — PAIN DESCRIPTION - DESCRIPTORS
DESCRIPTORS: ACHING;DISCOMFORT;SHARP
DESCRIPTORS: ACHING;DISCOMFORT

## 2024-08-02 ASSESSMENT — PAIN DESCRIPTION - LOCATION
LOCATION: ARM
LOCATION: ARM
LOCATION: SHOULDER

## 2024-08-02 ASSESSMENT — PAIN DESCRIPTION - ORIENTATION
ORIENTATION: RIGHT

## 2024-08-02 ASSESSMENT — PAIN DESCRIPTION - FREQUENCY: FREQUENCY: INTERMITTENT

## 2024-08-02 NOTE — PROGRESS NOTES
08/02/24 1500   Encounter Summary   Encounter Overview/Reason Spiritual/Emotional Needs   Service Provided For Patient and family together   Referral/Consult From Patient;Nurse   Support System Family members   Last Encounter  08/02/24  (PT in pain today, he shared his grief, he shared his concern for rehab, he has lots of supports, he finds hope and meaning in family and God, he wanted prayer, high risk readmit)   Complexity of Encounter Moderate   Begin Time 1440   End Time  1502   Total Time Calculated 22 min   Spiritual/Emotional needs   Type Spiritual Support;Emotional Distress;Difficult news received   Grief, Loss, and Adjustments   Type Adjustment to illness   Palliative Care   Type Palliative Care, Initial/Spiritual Assessment   Assessment/Intervention/Outcome   Assessment Anxious;Concerns with suffering;Despair;Fearful;Powerlessness;Sad;Stress overload;Tearful   Intervention Active listening;Discussed belief system/Synagogue practices/ern[place];Discussed illness injury and it’s impact;Discussed meaning/purpose;Discussed relationship with God;Explored/Affirmed feelings, thoughts, concerns;Nurtured Hope;Prayer (assurance of)/Loop;Sustaining Presence/Ministry of presence;Read/Provided Scripture   Outcome Concerns relieved;Connection/Belonging;Coping;Encouraged;Expressed feelings, needs, and concerns;Less anxious, Less agitated;Restored Hope;Venting emotion   Plan and Referrals   Plan/Referrals Continue Support (comment)

## 2024-08-02 NOTE — CARE COORDINATION
08/02/24 1442   Service Assessment   Patient Orientation Alert and Oriented   Cognition Alert   History Provided By Patient   Primary Caregiver Self   Support Systems Children;Family Members;[place] [facility]   Patient's Healthcare Decision Maker is: Legal Next of Kin   PCP Verified by CM Yes   Last Visit to PCP Within last 3 months   Prior Functional Level Independent in ADLs/IADLs   Current Functional Level Assistance with the following:;Bathing;Toileting;Dressing;Mobility   Can patient return to prior living arrangement Unknown at present   Ability to make needs known: Good   Family able to assist with home care needs: No   Would you like for me to discuss the discharge plan with any other family members/significant others, and if so, who? No   Financial Resources Medicare Community Resources None     This RN case manager to the bedside to initiate DC planning. Patient is from home, independent prior to admission. PCP and insurance. No C. I discussed therapy recs with patient. Patient is agreeable to SNF. SNF list provided. He is going to speak with his sons tonight about SNF choices. I will add to the weekend list for CM to follow up with tomorrow morning.     1520 - Patient's choices are as follows:  1.) ARU - Referral sent to [facility]   2.) [facility]

## 2024-08-02 NOTE — PLAN OF CARE
Problem: Discharge Planning  Goal: Discharge to home or other facility with appropriate resources  8/2/2024 1050 by Leesa Funes RN  Outcome: Progressing  Flowsheets (Taken 8/2/2024 0845)  Discharge to home or other facility with appropriate resources: Identify barriers to discharge with patient and caregiver  8/1/2024 2239 by Jayesh Hwaley RN  Outcome: Progressing     Problem: Safety - Adult  Goal: Free from fall injury  8/2/2024 1050 by Leesa Funes RN  Outcome: Progressing  8/1/2024 2239 by Jayesh Hawley RN  Outcome: Progressing     Problem: ABCDS Injury Assessment  Goal: Absence of physical injury  8/2/2024 1050 by Leesa Funes RN  Outcome: Progressing  8/1/2024 2239 by Jayesh Hawley RN  Outcome: Progressing     Problem: Pain  Goal: Verbalizes/displays adequate comfort level or baseline comfort level  8/2/2024 1050 by Leesa Funes RN  Outcome: Progressing  Flowsheets (Taken 8/2/2024 0845)  Verbalizes/displays adequate comfort level or baseline comfort level: Encourage patient to monitor pain and request assistance  8/1/2024 2239 by Jayesh Hawley RN  Outcome: Progressing     Problem: Nutrition Deficit:  Goal: Optimize nutritional status  8/2/2024 1050 by Leesa Funes RN  Outcome: Progressing  8/1/2024 2239 by Jayesh Hawley RN  Outcome: Progressing

## 2024-08-02 NOTE — CONSULTS
Patient:   Greg Easton    Date:  24  :  1940, 83 y.o.   Nephrologist: Kade Suarez MD  Provider: Jacobo Zazueta MD    Reason for Consult: Acute kidney injury with underlying chronic kidney stage G4 A3 with high potassium    Consult requested by : Kristopher Robertson MD       Chief Complaint:   Fall injuring right shoulder    HISTORY OF PRESENT ILLNESS/Brief hospital course  AND  brief background history    Mr. Easton, is a 83-year young male, who was brought to the emergency department via emergency medical service after sustaining a right shoulder injury after falling at home .  Mr. Easton told me he fell at home on Monday, but he presented to the emergency department yesterday which is Thursday, I will clarify with him later on along with his family member, it was also noted in the epic that he apparently had a syncopal episode, which once again I have to verify.    In any event on arrival in our emergency department, he was afebrile and hemodynamic stable, and oxygen saturation 96% while breathing ambient air.  Imaging study which include computed tomography of the cervical spine and head, without administration of intravenous contrast material was unrevealing, right shoulder x-ray showed mild displaced comminuted fracture of right humeral head and neck, computed tomography of the abdomen and pelvis without administration of intravenous contrast material showed inflammatory changes in the proximal left atrial stent, but no other outstanding finding.  Urine showed positive albuminuria and leukocyturia.        Appropriate bodily fluid culture were drawn, orthopedic consult was requested, his home torsemide 20 mg daily were initiated, he was subsequently admitted to medical floor for further evaluation and management.  Unfortunately his creatinine increased to 3.1 along with high potassium of 5.5.      Initial kidney function in the emergency department showed creatinine  2.4 and blood urea nitrogen of 43 and potassium of 4.9, hemoglobin is 9.4 g/dL.  After discussion with family and patient, decision made for medical therapy for fracture for now.  Also kidney consult was requested          I am of course very familiar with him.  I first met him in March 2022, when he was admitted with confusion, and sustained an acute kidney injury-thought to be from obstruction and infection as he has recurrent left hydronephrosis of unknown etiology.  I have been following him since then.  He does have chronic ureteral stent which has been exchanged periodically.  He does have intermittent high potassium requiring oral potassium binders.  He was admitted here in March, I saw him in my office after his discharge on June 13, 2024.  His blood pressure was 140/80 which is acceptable given his age and several falls before, he did have 1-2+ leg edema, potassium was 5.4, and creatinine was 2.3.  Torsemide 20 mg were added because of edema along with potassium binders at home.        Kidney data ( cr trend ) :   his creatinine was 0.0 0.9 mg/dL back in January 2011, it slowly increased to 1.4 mg/dL in 2014, he had an acute kidney injury with peak creatinine 2.0 back in 2016. since then he has had several acute kidney injury by creatinine criteria.  - does fluctuate between 2.1 to 2.5 mg/dL range   . ,  Creatinine 3.1 on August 2, 2024           Kidney image :      CT of the abdomen pelvis without administration of intravenous contrast in September 10, 2022           RIGHT kidney,   spleen, adrenal glands and pancreas appear unremarkable.  Relative high   density within the left urinary collecting system, mild to moderate severity   hydronephrosis with double-J ureter stent in unremarkable position.  No   calculus evident along the course of the stent.  Prominent left perinephric   stranding         Cardiac data :         Conclusions      Summary   Left ventricular systolic function is normal.   Ejection

## 2024-08-02 NOTE — CONSULTS
headache.  Did have syncope prior to admission.  CT abdomen pelvis this admission noted granulomatous disease spleen.  Per chart review this has been reported since 2002 as well as liver granuloma (CT A/P [facility]).  On personal review of imaging available in our PACS as well as reports these are similar appearing possibly very slightly increased limited by differences in imaging technique.  He presents also had a PET/CT in 2009 for similar lymphoma concern (available documentation is limited) which was negative.    Past Medical History:   Diagnosis Date    Acute kidney failure (HCC)     Acute urinary tract infection 03/15/2012    Anemia     Ataxia 01/01/2010    Ataxia     Bacteremia     Candidiasis     Chronic combined systolic and diastolic congestive heart failure (HCC)     Chronic kidney disease     Cognitive communication deficit     Diabetes mellitus (HCC) 01/01/2011    type 2, controlled    Extended spectrum beta lactamase (ESBL) resistance     Fusion of spine of cervical region 10/01/2012    Gait disturbance 01/01/2011    History of prostate cancer 01/01/1996    adenocarcinoma    History of tobacco use 01/01/1959    Hydronephrosis     Hyperkalemia     Hyperlipidemia 01/01/2011    Hypertension     Hypertensive heart disease with CHF (congestive heart failure) (HCC)     Hypotension     Immunodeficiency (HCC)     Metabolic encephalopathy     Muscle weakness     Osteoarthritis 01/01/2011    Osteoarthritis     Secondary Hyperaldosteronism (HCC)     Supraventricular tachycardia (HCC)     Tubulo-interstitial nephritis        Past Surgical History:   Procedure Laterality Date    BLADDER SURGERY      BLADDER SURGERY Left 03/17/2022    CYSTOSCOPY LEFT STENT EXCHANGE performed by Bal Mckeon MD at [facility] OR    CHANGE OF BLADDER TUBE,COMPLICATED  5/12/2024    COLON SURGERY      CYSTOSCOPY Left 09/07/2022    LEFT CYSTOSCOPY URETERAL STENT EXCHANGE AND SUPRABUPIC PACE CATHETER EXCHANGE performed by Mirella Barrett    Psychiatric/Behavioral:  Negative for depression. The patient is not nervous/anxious.       Vital Signs: BP (!) 143/48   Pulse 79   Temp 98.6 °F (37 °C) (Oral)   Resp 16   Ht 1.727 m (5' 8\")   Wt 75.8 kg (167 lb)   SpO2 95%   BMI 25.39 kg/m²      Physical Exam:  CONSTITUTIONAL: awake, alert, cooperative, no apparent distress, thin with temporal wasting  EYES: EOM grossly intact, + pallor, no scleral icterus  ENT: Normocephalic, atraumatic, dry mucous membranes  NECK: supple, symmetrical, no jugular venous distension  HEMATOLOGIC/LYMPHATIC: no cervical, supraclavicular or axillary lymphadenopathy   LUNGS: CTA bilaterally, no wheezes/rhonchi/rales, unlabored on RA   CARDIOVASCULAR: regular rate and rhythm,  no murmur noted  ABDOMEN: Non-tender, non-distended, BS. + suprapubic cath.  MUSCULOSKELETAL: Right arm splinted, limited range of motion.  + sling.  No other obvious joint's.  NEUROLOGIC: CN II - XII grossly intact.  Motor skills grossly intact.   SKIN: warm and dry, no jaundice, no bruising or petechiae.  EXTREMITIES: no peripheral edema, no clubbing or cyanosis     Labs:    Lab Results   Component Value Date    WBC 11.3 (H) 07/31/2024    HGB 9.4 (L) 07/31/2024    HCT 30.2 (L) 07/31/2024    MCV 97.4 07/31/2024     07/31/2024    LYMPHOPCT 12.8 (L) 07/31/2024    RBC 3.10 (L) 07/31/2024    MCH 30.3 07/31/2024    MCHC 31.1 (L) 07/31/2024    RDW 15.9 (H) 07/31/2024       Lab Results   Component Value Date    INR 1.2 04/16/2024    PROTIME 15.7 (H) 04/16/2024     Lab Results   Component Value Date     07/31/2024    K 4.9 07/31/2024     07/31/2024    CO2 22 07/31/2024    BUN 43 (H) 07/31/2024    CREATININE 2.4 (H) 07/31/2024    GLUCOSE 154 (H) 07/31/2024    CALCIUM 9.0 07/31/2024    PHOS 3.2 05/13/2024    MG 2.0 05/14/2024    BILITOT 0.4 07/31/2024    ALKPHOS 85 07/31/2024    AST 13 (L) 07/31/2024    ALT 9 (L) 07/31/2024    LABGLOM 26 (L) 07/31/2024    GFRAA 31 (L) 10/13/2022    AGRATIO 1.0

## 2024-08-02 NOTE — PROGRESS NOTES
PHYSICAL THERAPY  [facility]     Attempted PT evaluation for patient per physician orders. Pt supine in bed sleeping, easily aroused but drowsy, falling asleep mid-sentences and requires increased stimulation to awake. \"It's late.\" While moving legs to EOB, pt falling asleep. Will re-attempt at later date.     Nadine Adrian, PT, DPT  8/2/24

## 2024-08-02 NOTE — PROGRESS NOTES
4 Eyes Skin Assessment     NAME:  Greg Easton  YOB: 1940  MEDICAL RECORD NUMBER:  [id number]    The patient is being assessed for  Admission    I agree that at least one RN has performed a thorough Head to Toe Skin Assessment on the patient. ALL assessment sites listed below have been assessed.      Areas assessed by both nurses:    Head, Face, Ears, Shoulders, Back, Chest, Arms, Elbows, Hands, Sacrum. Buttock, Coccyx, Ischium, Legs. Feet and Heels, and Under Medical Devices         Does the Patient have a Wound? No noted wound(s)       Judd Prevention initiated by RN: No  Wound Care Orders initiated by RN: No    Pressure Injury (Stage 3,4, Unstageable, DTI, NWPT, and Complex wounds) if present, place Wound referral order by RN under : No    New Ostomies, if present place, Ostomy referral order under : No     Nurse 1 eSignature: Electronically signed by Leesa Funes RN on 8/2/24 at 10:51 AM EDT    **SHARE this note so that the co-signing nurse can place an eSignature**    Nurse 2 eSignature: {Esignature:[id number]}

## 2024-08-02 NOTE — PROGRESS NOTES
In-Patient Progress Note    Patient:  Greg Easton 83 y.o. male MRN: [id number]     Date of Service: 8/2/2024    Hospital Day: 3      Chief complaint: had concerns including Fall and Shoulder Injury.      Subjective   Patient seen and examined in the morning. Patient states he has pain over Rt. Shoulder. Otherwise, feels fine.       See ROS    I have reviewed all pertinent PMHx, PSHx, FamHx, SocialHx, medications, and allergies and updated history as appropriate. I have reviewed images as appropriate.     Assessment and Plan   Greg Easton, a 83 y.o. male, with a history of Prostate cancer, urinary incontinence, chronic indwelling suprapubic cath, splenic granuloma, CKD IV, ambulatory dysfunction, IDDM, GERD, HFpEF, Hypothyroidism  was admitted on 7/31/2024 with complaints of had concerns including Fall and Shoulder Injury.    Assessment and Plan:  Pre-syncope vs Syncope 2/2 possible UTI   Rt. Humerus Fracture. Non-operative per ortho. Continue PT/OT  E Coli and Proteus UTI with chronic indwelling suprapubic cath. Cath changed 2 days prior to presentation by urology. ID on board. Continue meropenem. F/U final C/S.   PAMELLA on CKD. Baseline Cr 2.1-2.3. Now 3.1. Nephro consult.   Hyperkalemia. K 5.5. Lokelma BID X 2.   Splenic granuloma. Hem/onc on board.   Ambulatory dysfunction. Normally ambulates with walker  Chronic Normocytic Anemia 2/2 likely anemia of chronic disease.   IDDM  GERD   Urinary Incontinenece   H/O Prostate cancer   Chronic HFpEF  Hypothyroidism       # Peptic ulcer prophylaxis: -   # DVT Prophylaxis: Heparin TID  Jon, son, to help with decision making       Current living situation: Home   Expected Disposition: Home with [facility]  Estimated discharge date: 1-2 days.     Personally reviewed Lab Studies and Imaging     Discussed management of the case with IDR team - pending final c/s    Discussed with hem/onc - granulomas are old. Work up for malignancy -ve in the past.     Drugs that require

## 2024-08-03 LAB
ALBUMIN SERPL-MCNC: 3.3 GM/DL (ref 3.4–5)
ALP BLD-CCNC: 81 IU/L (ref 40–128)
ALT SERPL-CCNC: 8 U/L (ref 10–40)
ANION GAP SERPL CALCULATED.3IONS-SCNC: 15 MMOL/L (ref 7–16)
AST SERPL-CCNC: 14 IU/L (ref 15–37)
BACTERIA: ABNORMAL /HPF
BASOPHILS ABSOLUTE: 0 K/CU MM
BASOPHILS RELATIVE PERCENT: 0.3 % (ref 0–1)
BILIRUB SERPL-MCNC: 0.2 MG/DL (ref 0–1)
BILIRUBIN, URINE: NEGATIVE MG/DL
BLOOD, URINE: ABNORMAL
BUN SERPL-MCNC: 61 MG/DL (ref 6–23)
CALCIUM SERPL-MCNC: 8.6 MG/DL (ref 8.3–10.6)
CHLORIDE BLD-SCNC: 102 MMOL/L (ref 99–110)
CLARITY, UA: CLEAR
CO2: 22 MMOL/L (ref 21–32)
COLOR, UA: YELLOW
CREAT SERPL-MCNC: 3.3 MG/DL (ref 0.9–1.3)
CREATININE URINE: 90.9 MG/DL (ref 39–259)
CULTURE: ABNORMAL
DIFFERENTIAL TYPE: ABNORMAL
EOSINOPHILS ABSOLUTE: 0.5 K/CU MM
EOSINOPHILS RELATIVE PERCENT: 6 % (ref 0–3)
GFR, ESTIMATED: 18 ML/MIN/1.73M2
GLUCOSE BLD-MCNC: 208 MG/DL (ref 70–99)
GLUCOSE BLD-MCNC: 290 MG/DL (ref 70–99)
GLUCOSE BLD-MCNC: 312 MG/DL (ref 70–99)
GLUCOSE BLD-MCNC: 320 MG/DL (ref 70–99)
GLUCOSE SERPL-MCNC: 280 MG/DL (ref 70–99)
GLUCOSE URINE: 100 MG/DL
HCT VFR BLD CALC: 25.7 % (ref 42–52)
HEMOGLOBIN: 8.1 GM/DL (ref 13.5–18)
HYALINE CASTS: 2 /LPF
IMMATURE NEUTROPHIL %: 0.4 % (ref 0–0.43)
KETONES, URINE: NEGATIVE MG/DL
LEUKOCYTE ESTERASE, URINE: ABNORMAL
LYMPHOCYTES ABSOLUTE: 1.4 K/CU MM
LYMPHOCYTES RELATIVE PERCENT: 15.5 % (ref 24–44)
Lab: ABNORMAL
MAGNESIUM: 2 MG/DL (ref 1.8–2.4)
MCH RBC QN AUTO: 30.8 PG (ref 27–31)
MCHC RBC AUTO-ENTMCNC: 31.5 % (ref 32–36)
MCV RBC AUTO: 97.7 FL (ref 78–100)
MONOCYTES ABSOLUTE: 1.1 K/CU MM
MONOCYTES RELATIVE PERCENT: 12 % (ref 0–4)
MUCUS: ABNORMAL HPF
NEUTROPHILS ABSOLUTE: 5.9 K/CU MM
NEUTROPHILS RELATIVE PERCENT: 65.8 % (ref 36–66)
NITRITE URINE, QUANTITATIVE: NEGATIVE
NUCLEATED RBC %: 0 %
PDW BLD-RTO: 15.5 % (ref 11.7–14.9)
PH, URINE: 7.5 (ref 5–8)
PHOSPHORUS: 3.5 MG/DL (ref 2.5–4.9)
PLATELET # BLD: 170 K/CU MM (ref 140–440)
PMV BLD AUTO: 11.3 FL (ref 7.5–11.1)
POTASSIUM SERPL-SCNC: 5.3 MMOL/L (ref 3.5–5.1)
PROT/CREAT RATIO, UR: 1.4
PROTEIN UA: 100 MG/DL
RBC # BLD: 2.63 M/CU MM (ref 4.6–6.2)
RBC URINE: 61 /HPF (ref 0–3)
SODIUM BLD-SCNC: 139 MMOL/L (ref 135–145)
SODIUM URINE: 48 MMOL/L (ref 35–167)
SPECIFIC GRAVITY UA: 1.02 (ref 1–1.03)
SPECIMEN: ABNORMAL
SQUAMOUS EPITHELIAL: 10 /HPF
TOTAL CK: 50 IU/L (ref 38–174)
TOTAL IMMATURE NEUTOROPHIL: 0.04 K/CU MM
TOTAL NUCLEATED RBC: 0 K/CU MM
TOTAL PROTEIN: 6.7 GM/DL (ref 6.4–8.2)
TRICHOMONAS: ABNORMAL /HPF
URINE TOTAL PROTEIN: 123 MG/DL
UROBILINOGEN, URINE: 0.2 MG/DL (ref 0.2–1)
WBC # BLD: 9 K/CU MM (ref 4–10.5)
WBC CLUMP: ABNORMAL /HPF
WBC UA: 817 /HPF (ref 0–2)

## 2024-08-03 PROCEDURE — 83735 ASSAY OF MAGNESIUM: CPT

## 2024-08-03 PROCEDURE — 84300 ASSAY OF URINE SODIUM: CPT

## 2024-08-03 PROCEDURE — 6370000000 HC RX 637 (ALT 250 FOR IP): Performed by: STUDENT IN AN ORGANIZED HEALTH CARE EDUCATION/TRAINING PROGRAM

## 2024-08-03 PROCEDURE — 94761 N-INVAS EAR/PLS OXIMETRY MLT: CPT

## 2024-08-03 PROCEDURE — 6370000000 HC RX 637 (ALT 250 FOR IP): Performed by: INTERNAL MEDICINE

## 2024-08-03 PROCEDURE — 6360000002 HC RX W HCPCS: Performed by: STUDENT IN AN ORGANIZED HEALTH CARE EDUCATION/TRAINING PROGRAM

## 2024-08-03 PROCEDURE — 6360000002 HC RX W HCPCS: Performed by: INTERNAL MEDICINE

## 2024-08-03 PROCEDURE — 36415 COLL VENOUS BLD VENIPUNCTURE: CPT

## 2024-08-03 PROCEDURE — 1200000000 HC SEMI PRIVATE

## 2024-08-03 PROCEDURE — 82550 ASSAY OF CK (CPK): CPT

## 2024-08-03 PROCEDURE — 80053 COMPREHEN METABOLIC PANEL: CPT

## 2024-08-03 PROCEDURE — 2580000003 HC RX 258: Performed by: STUDENT IN AN ORGANIZED HEALTH CARE EDUCATION/TRAINING PROGRAM

## 2024-08-03 PROCEDURE — 84100 ASSAY OF PHOSPHORUS: CPT

## 2024-08-03 PROCEDURE — 84156 ASSAY OF PROTEIN URINE: CPT

## 2024-08-03 PROCEDURE — 82570 ASSAY OF URINE CREATININE: CPT

## 2024-08-03 PROCEDURE — 85025 COMPLETE CBC W/AUTO DIFF WBC: CPT

## 2024-08-03 PROCEDURE — 81001 URINALYSIS AUTO W/SCOPE: CPT

## 2024-08-03 PROCEDURE — 82962 GLUCOSE BLOOD TEST: CPT

## 2024-08-03 RX ORDER — GABAPENTIN 100 MG/1
100 CAPSULE ORAL 3 TIMES DAILY
Status: DISCONTINUED | OUTPATIENT
Start: 2024-08-03 | End: 2024-08-03

## 2024-08-03 RX ORDER — HYDROMORPHONE HYDROCHLORIDE 1 MG/ML
0.5 INJECTION, SOLUTION INTRAMUSCULAR; INTRAVENOUS; SUBCUTANEOUS ONCE
Status: COMPLETED | OUTPATIENT
Start: 2024-08-03 | End: 2024-08-03

## 2024-08-03 RX ORDER — GABAPENTIN 100 MG/1
100 CAPSULE ORAL 3 TIMES DAILY
Status: DISCONTINUED | OUTPATIENT
Start: 2024-08-03 | End: 2024-08-08

## 2024-08-03 RX ADMIN — MICONAZOLE NITRATE: 2 POWDER TOPICAL at 08:20

## 2024-08-03 RX ADMIN — SODIUM ZIRCONIUM CYCLOSILICATE 10 G: 10 POWDER, FOR SUSPENSION ORAL at 20:04

## 2024-08-03 RX ADMIN — GABAPENTIN 100 MG: 100 CAPSULE ORAL at 09:12

## 2024-08-03 RX ADMIN — GABAPENTIN 100 MG: 100 CAPSULE ORAL at 15:21

## 2024-08-03 RX ADMIN — INSULIN LISPRO 3 UNITS: 100 INJECTION, SOLUTION INTRAVENOUS; SUBCUTANEOUS at 16:32

## 2024-08-03 RX ADMIN — HEPARIN SODIUM 5000 UNITS: 5000 INJECTION INTRAVENOUS; SUBCUTANEOUS at 22:33

## 2024-08-03 RX ADMIN — MEROPENEM 500 MG: 500 INJECTION, POWDER, FOR SOLUTION INTRAVENOUS at 04:05

## 2024-08-03 RX ADMIN — INSULIN LISPRO 2 UNITS: 100 INJECTION, SOLUTION INTRAVENOUS; SUBCUTANEOUS at 11:51

## 2024-08-03 RX ADMIN — ACETAMINOPHEN 650 MG: 325 TABLET ORAL at 20:05

## 2024-08-03 RX ADMIN — INSULIN LISPRO 1 UNITS: 100 INJECTION, SOLUTION INTRAVENOUS; SUBCUTANEOUS at 08:19

## 2024-08-03 RX ADMIN — HYDROMORPHONE HYDROCHLORIDE 0.5 MG: 1 INJECTION, SOLUTION INTRAMUSCULAR; INTRAVENOUS; SUBCUTANEOUS at 05:51

## 2024-08-03 RX ADMIN — HYDROMORPHONE HYDROCHLORIDE 0.5 MG: 1 INJECTION, SOLUTION INTRAMUSCULAR; INTRAVENOUS; SUBCUTANEOUS at 09:12

## 2024-08-03 RX ADMIN — HYDRALAZINE HYDROCHLORIDE 100 MG: 50 TABLET ORAL at 22:33

## 2024-08-03 RX ADMIN — SODIUM CHLORIDE, PRESERVATIVE FREE 10 ML: 5 INJECTION INTRAVENOUS at 08:20

## 2024-08-03 RX ADMIN — HEPARIN SODIUM 5000 UNITS: 5000 INJECTION INTRAVENOUS; SUBCUTANEOUS at 15:21

## 2024-08-03 RX ADMIN — HYDROMORPHONE HYDROCHLORIDE 0.5 MG: 1 INJECTION, SOLUTION INTRAMUSCULAR; INTRAVENOUS; SUBCUTANEOUS at 14:03

## 2024-08-03 RX ADMIN — INSULIN LISPRO 4 UNITS: 100 INJECTION, SOLUTION INTRAVENOUS; SUBCUTANEOUS at 20:16

## 2024-08-03 RX ADMIN — HYDRALAZINE HYDROCHLORIDE 100 MG: 50 TABLET ORAL at 15:21

## 2024-08-03 RX ADMIN — HYDROMORPHONE HYDROCHLORIDE 0.5 MG: 1 INJECTION, SOLUTION INTRAMUSCULAR; INTRAVENOUS; SUBCUTANEOUS at 00:51

## 2024-08-03 RX ADMIN — LEVOTHYROXINE SODIUM 75 MCG: 0.07 TABLET ORAL at 05:41

## 2024-08-03 RX ADMIN — SODIUM ZIRCONIUM CYCLOSILICATE 10 G: 10 POWDER, FOR SUSPENSION ORAL at 11:51

## 2024-08-03 RX ADMIN — MICONAZOLE NITRATE: 2 POWDER TOPICAL at 20:11

## 2024-08-03 RX ADMIN — SODIUM CHLORIDE, PRESERVATIVE FREE 10 ML: 5 INJECTION INTRAVENOUS at 20:12

## 2024-08-03 RX ADMIN — AMLODIPINE BESYLATE 10 MG: 10 TABLET ORAL at 08:19

## 2024-08-03 RX ADMIN — HYDRALAZINE HYDROCHLORIDE 100 MG: 50 TABLET ORAL at 05:41

## 2024-08-03 RX ADMIN — SODIUM CHLORIDE 25 ML: 9 INJECTION, SOLUTION INTRAVENOUS at 04:05

## 2024-08-03 RX ADMIN — GABAPENTIN 100 MG: 100 CAPSULE ORAL at 20:05

## 2024-08-03 RX ADMIN — HEPARIN SODIUM 5000 UNITS: 5000 INJECTION INTRAVENOUS; SUBCUTANEOUS at 05:41

## 2024-08-03 RX ADMIN — MEROPENEM 500 MG: 500 INJECTION, POWDER, FOR SOLUTION INTRAVENOUS at 16:32

## 2024-08-03 ASSESSMENT — PAIN DESCRIPTION - LOCATION
LOCATION: SHOULDER
LOCATION: SHOULDER
LOCATION: SHOULDER;LEG;FOOT
LOCATION: SHOULDER

## 2024-08-03 ASSESSMENT — PAIN SCALES - GENERAL
PAINLEVEL_OUTOF10: 5
PAINLEVEL_OUTOF10: 7
PAINLEVEL_OUTOF10: 1
PAINLEVEL_OUTOF10: 9
PAINLEVEL_OUTOF10: 10
PAINLEVEL_OUTOF10: 7
PAINLEVEL_OUTOF10: 8
PAINLEVEL_OUTOF10: 10
PAINLEVEL_OUTOF10: 3

## 2024-08-03 ASSESSMENT — PAIN DESCRIPTION - PAIN TYPE
TYPE: ACUTE PAIN

## 2024-08-03 ASSESSMENT — PAIN - FUNCTIONAL ASSESSMENT
PAIN_FUNCTIONAL_ASSESSMENT: PREVENTS OR INTERFERES SOME ACTIVE ACTIVITIES AND ADLS

## 2024-08-03 ASSESSMENT — PAIN DESCRIPTION - DESCRIPTORS
DESCRIPTORS: ACHING;THROBBING
DESCRIPTORS: ACHING;SHOOTING
DESCRIPTORS: ACHING;THROBBING
DESCRIPTORS: ACHING;THROBBING

## 2024-08-03 ASSESSMENT — PAIN DESCRIPTION - ONSET
ONSET: ON-GOING

## 2024-08-03 ASSESSMENT — PAIN DESCRIPTION - FREQUENCY
FREQUENCY: CONTINUOUS

## 2024-08-03 ASSESSMENT — PAIN DESCRIPTION - ORIENTATION
ORIENTATION: RIGHT
ORIENTATION: LEFT
ORIENTATION: RIGHT;LEFT
ORIENTATION: RIGHT

## 2024-08-03 NOTE — CARE COORDINATION
CM following up on pt's preferred choice for IP therapy. Will need updated PT/OT notes for placement and insurance pre certification purposes. CM spoke with OT in physicians room/IDR room and also placed whiteboard to therapy.

## 2024-08-03 NOTE — PROGRESS NOTES
Attempted to see pt this morning and this afternoon but pt is refusing therapy due to pain. Pt educated on benefits of therapy. Pt educated change of position that relieve pain but pt still refusing. Will re-attempt as schedule allows.

## 2024-08-03 NOTE — PROGRESS NOTES
In-Patient Progress Note    Patient:  Greg Easton 83 y.o. male MRN: [id number]     Date of Service: 8/3/2024    Hospital Day: 4      Chief complaint: had concerns including Fall and Shoulder Injury.      Subjective   Patient seen and examined in the morning. Patient states he had some burning pain B/L LE today. Rt. Shoulder with some pain as well. Improved somewhat after meds.       See ROS    I have reviewed all pertinent PMHx, PSHx, FamHx, SocialHx, medications, and allergies and updated history as appropriate. I have reviewed images as appropriate.     Assessment and Plan   Greg Easton, a 83 y.o. male, with a history of Prostate cancer, urinary incontinence, chronic indwelling suprapubic cath, splenic granuloma, CKD IV, ambulatory dysfunction, IDDM, GERD, HFpEF, Hypothyroidism  was admitted on 7/31/2024 with complaints of had concerns including Fall and Shoulder Injury.    Assessment and Plan:  Pre-syncope vs Syncope 2/2 Likely UTI   Rt. Humerus Fracture. Non-operative per ortho. Continue PT/OT  ESBL E Coli and Pan-sensitive Proteus UTI with chronic indwelling suprapubic cath. Cath changed 2 days prior to presentation by urology. ID on board. Continue meropenem.   E Coli Bacteremia.   PAMELLA on CKD. Baseline Cr 2.1-2.3. Now 3.3. Nephro on board.   Hyperkalemia. K 5.3. Lokelma BID X 2 again. May need to resume home lokelma daily.   Splenic granuloma. Hem/onc on board.   Ambulatory dysfunction. Normally ambulates with walker  Chronic Normocytic Anemia 2/2 likely anemia of chronic disease.   IDDM with Peripheral neuropathy. Start on gabapentin 100mg TID.   GERD   Urinary Incontinenece   H/O Prostate cancer   Chronic HFpEF  Hypothyroidism       # Peptic ulcer prophylaxis: -   # DVT Prophylaxis: Heparin TID  Jon, son, to help with decision making       Current living situation: Home   Expected Disposition: ARU  Estimated discharge date: 1-2 days.     Personally reviewed Lab Studies and Imaging     Discussed  multilevel degenerative changes with associated bilateral facet and uncovertebral joint arthropathy. Extensive posterior spinal fusion hardware in the cervical spine is intact without evidence of loosening or fracture. Near complete opacification of the right maxillary sinus.     No CT evidence of acute intracranial or cervical spine abnormality. Electronically signed by Jimmy Thornton       Recent Results (from the past 24 hour(s))   POCT Glucose    Collection Time: 08/02/24  4:26 PM   Result Value Ref Range    POC Glucose 270 (H) 70 - 99 MG/DL   POCT Glucose    Collection Time: 08/02/24  8:04 PM   Result Value Ref Range    POC Glucose 182 (H) 70 - 99 MG/DL   POCT Glucose    Collection Time: 08/03/24  7:36 AM   Result Value Ref Range    POC Glucose 208 (H) 70 - 99 MG/DL   CBC with Auto Differential    Collection Time: 08/03/24  9:11 AM   Result Value Ref Range    WBC 9.0 4.0 - 10.5 K/CU MM    RBC 2.63 (L) 4.6 - 6.2 M/CU MM    Hemoglobin 8.1 (L) 13.5 - 18.0 GM/DL    Hematocrit 25.7 (L) 42 - 52 %    MCV 97.7 78 - 100 FL    MCH 30.8 27 - 31 PG    MCHC 31.5 (L) 32.0 - 36.0 %    RDW 15.5 (H) 11.7 - 14.9 %    Platelets 170 140 - 440 K/CU MM    MPV 11.3 (H) 7.5 - 11.1 FL    Differential Type AUTOMATED DIFFERENTIAL     Neutrophils % 65.8 36 - 66 %    Lymphocytes % 15.5 (L) 24 - 44 %    Monocytes % 12.0 (H) 0 - 4 %    Eosinophils % 6.0 (H) 0 - 3 %    Basophils % 0.3 0 - 1 %    Neutrophils Absolute 5.9 K/CU MM    Lymphocytes Absolute 1.4 K/CU MM    Monocytes Absolute 1.1 K/CU MM    Eosinophils Absolute 0.5 K/CU MM    Basophils Absolute 0.0 K/CU MM    Nucleated RBC % 0.0 %    Total Nucleated RBC 0.0 K/CU MM    Total Immature Neutrophil 0.04 K/CU MM    Immature Neutrophil % 0.4 0 - 0.43 %   Magnesium    Collection Time: 08/03/24  9:11 AM   Result Value Ref Range    Magnesium 2.0 1.8 - 2.4 mg/dl   Phosphorus    Collection Time: 08/03/24  9:11 AM   Result Value Ref Range    Phosphorus 3.5 2.5 - 4.9 MG/DL   Comprehensive Metabolic

## 2024-08-03 NOTE — PLAN OF CARE
Problem: Discharge Planning  Goal: Discharge to home or other facility with appropriate resources  8/2/2024 2225 by Glenna Maxwell RN  Outcome: Progressing  Flowsheets (Taken 8/2/2024 2145)  Discharge to home or other facility with appropriate resources: Identify barriers to discharge with patient and caregiver  8/2/2024 1050 by Leesa Funes RN  Outcome: Progressing  Flowsheets (Taken 8/2/2024 0845)  Discharge to home or other facility with appropriate resources: Identify barriers to discharge with patient and caregiver     Problem: Safety - Adult  Goal: Free from fall injury  8/2/2024 2225 by Glenna Maxwell RN  Outcome: Progressing  8/2/2024 1050 by Leesa Funes RN  Outcome: Progressing     Problem: ABCDS Injury Assessment  Goal: Absence of physical injury  8/2/2024 2225 by Glenna Maxwell RN  Outcome: Progressing  8/2/2024 1050 by Leesa Funes RN  Outcome: Progressing     Problem: Pain  Goal: Verbalizes/displays adequate comfort level or baseline comfort level  8/2/2024 2225 by Glenna Maxwell RN  Outcome: Progressing  8/2/2024 1050 by Leesa Funes RN  Outcome: Progressing  Flowsheets (Taken 8/2/2024 0845)  Verbalizes/displays adequate comfort level or baseline comfort level: Encourage patient to monitor pain and request assistance     Problem: Nutrition Deficit:  Goal: Optimize nutritional status  8/2/2024 2225 by Glenna Maxwell RN  Outcome: Progressing  8/2/2024 1050 by Leesa Funes RN  Outcome: Progressing

## 2024-08-03 NOTE — PROGRESS NOTES
Patient needed urine samples sent per Dr. Suarez. This nurse spoke with Dr. Suarez about suprapubic cath being chronic. Dr. Suarez states it is okay if the catheter isn't changed because it isn't cultures, it's kidney function related levels. Urine sample collected and sent.

## 2024-08-03 NOTE — PROGRESS NOTES
Nephrology Progress Note  8/3/2024 9:03 AM        Subjective:   Admit Date: 7/31/2024  PCP: Jacobo Zazueta MD    Interval History: Patient seen in early morning  He was in severe pain  He was telling me that -he would rather die than suffer from  pain    Diet: Minimal    ROS: Pain not only in the shoulder, back leg etc.  Urine output recorded 900 cc for the last 24 hours  No fever and acceptable blood pressure    Data:     Current meds:    gabapentin  100 mg Oral TID    HYDROmorphone  0.5 mg IntraVENous Once    heparin (porcine)  5,000 Units SubCUTAneous 3 times per day    amLODIPine  10 mg Oral Daily    hydrALAZINE  100 mg Oral 3 times per day    insulin lispro  0-4 Units SubCUTAneous TID WC    insulin lispro  0-4 Units SubCUTAneous Nightly    levothyroxine  75 mcg Oral Daily    [Held by provider] torsemide  20 mg Oral Daily    sodium chloride flush  5-40 mL IntraVENous 2 times per day    meropenem  500 mg IntraVENous Q12H    miconazole   Topical BID      dextrose      sodium chloride 25 mL (08/03/24 0405)         I/O last 3 completed shifts:  In: 340 [P.O.:340]  Out: 1325 [Urine:1325]    CBC:   Recent Labs     07/31/24  2246 08/02/24  1051   WBC 11.3* 7.5   HGB 9.4* 8.1*    180          Recent Labs     07/31/24  2315 08/02/24  1051    135   K 4.9 5.5*    100   CO2 22 23   BUN 43* 55*   CREATININE 2.4* 3.1*   GLUCOSE 154* 267*       Lab Results   Component Value Date    CALCIUM 8.8 08/02/2024    PHOS 3.2 05/13/2024       Objective:     Vitals: /79   Pulse 88   Temp 99.7 °F (37.6 °C) (Oral)   Resp 18   Ht 1.727 m (5' 8\")   Wt 75.8 kg (167 lb)   SpO2 98%   BMI 25.39 kg/m² ,    General appearance: Alert, awake but in pain  HEENT: Positive conjunctival pallor  Neck: Supple-he is wearing his sling in the right arm  Lungs: No gross crackles although limited anterior exam  Heart: Seemed regular rate and rhythm  Abdomen: Soft-he does have suprapubic catheter  Extremities: No gross

## 2024-08-04 LAB
ANION GAP SERPL CALCULATED.3IONS-SCNC: 14 MMOL/L (ref 7–16)
BASOPHILS ABSOLUTE: 0 K/CU MM
BASOPHILS RELATIVE PERCENT: 0.4 % (ref 0–1)
BUN SERPL-MCNC: 60 MG/DL (ref 6–23)
CALCIUM SERPL-MCNC: 8.7 MG/DL (ref 8.3–10.6)
CHLORIDE BLD-SCNC: 99 MMOL/L (ref 99–110)
CO2: 23 MMOL/L (ref 21–32)
CREAT SERPL-MCNC: 3.2 MG/DL (ref 0.9–1.3)
CULTURE: ABNORMAL
CULTURE: ABNORMAL
DIFFERENTIAL TYPE: ABNORMAL
EOSINOPHILS ABSOLUTE: 0.5 K/CU MM
EOSINOPHILS RELATIVE PERCENT: 6.4 % (ref 0–3)
GFR, ESTIMATED: 18 ML/MIN/1.73M2
GLUCOSE BLD-MCNC: 216 MG/DL (ref 70–99)
GLUCOSE BLD-MCNC: 291 MG/DL (ref 70–99)
GLUCOSE BLD-MCNC: 306 MG/DL (ref 70–99)
GLUCOSE BLD-MCNC: 328 MG/DL (ref 70–99)
GLUCOSE SERPL-MCNC: 293 MG/DL (ref 70–99)
HCT VFR BLD CALC: 26.5 % (ref 42–52)
HEMOGLOBIN: 8.3 GM/DL (ref 13.5–18)
IMMATURE NEUTROPHIL %: 0.3 % (ref 0–0.43)
LYMPHOCYTES ABSOLUTE: 1.6 K/CU MM
LYMPHOCYTES RELATIVE PERCENT: 19.6 % (ref 24–44)
Lab: ABNORMAL
MAGNESIUM: 2.1 MG/DL (ref 1.8–2.4)
MCH RBC QN AUTO: 30.5 PG (ref 27–31)
MCHC RBC AUTO-ENTMCNC: 31.3 % (ref 32–36)
MCV RBC AUTO: 97.4 FL (ref 78–100)
MONOCYTES ABSOLUTE: 0.8 K/CU MM
MONOCYTES RELATIVE PERCENT: 10.2 % (ref 0–4)
NEUTROPHILS ABSOLUTE: 5 K/CU MM
NEUTROPHILS RELATIVE PERCENT: 63.1 % (ref 36–66)
NUCLEATED RBC %: 0 %
PDW BLD-RTO: 15.4 % (ref 11.7–14.9)
PHOSPHORUS: 3.6 MG/DL (ref 2.5–4.9)
PLATELET # BLD: 200 K/CU MM (ref 140–440)
PMV BLD AUTO: 10.7 FL (ref 7.5–11.1)
POTASSIUM SERPL-SCNC: 4.6 MMOL/L (ref 3.5–5.1)
RBC # BLD: 2.72 M/CU MM (ref 4.6–6.2)
SODIUM BLD-SCNC: 136 MMOL/L (ref 135–145)
SPECIMEN: ABNORMAL
TOTAL IMMATURE NEUTOROPHIL: 0.02 K/CU MM
TOTAL NUCLEATED RBC: 0 K/CU MM
WBC # BLD: 8 K/CU MM (ref 4–10.5)

## 2024-08-04 PROCEDURE — 84100 ASSAY OF PHOSPHORUS: CPT

## 2024-08-04 PROCEDURE — 1200000000 HC SEMI PRIVATE

## 2024-08-04 PROCEDURE — 6370000000 HC RX 637 (ALT 250 FOR IP): Performed by: INTERNAL MEDICINE

## 2024-08-04 PROCEDURE — 97530 THERAPEUTIC ACTIVITIES: CPT

## 2024-08-04 PROCEDURE — 6360000002 HC RX W HCPCS: Performed by: STUDENT IN AN ORGANIZED HEALTH CARE EDUCATION/TRAINING PROGRAM

## 2024-08-04 PROCEDURE — 82962 GLUCOSE BLOOD TEST: CPT

## 2024-08-04 PROCEDURE — 80048 BASIC METABOLIC PNL TOTAL CA: CPT

## 2024-08-04 PROCEDURE — 83735 ASSAY OF MAGNESIUM: CPT

## 2024-08-04 PROCEDURE — 85025 COMPLETE CBC W/AUTO DIFF WBC: CPT

## 2024-08-04 PROCEDURE — 94761 N-INVAS EAR/PLS OXIMETRY MLT: CPT

## 2024-08-04 PROCEDURE — 6360000002 HC RX W HCPCS: Performed by: INTERNAL MEDICINE

## 2024-08-04 PROCEDURE — 6370000000 HC RX 637 (ALT 250 FOR IP): Performed by: STUDENT IN AN ORGANIZED HEALTH CARE EDUCATION/TRAINING PROGRAM

## 2024-08-04 PROCEDURE — 2580000003 HC RX 258: Performed by: STUDENT IN AN ORGANIZED HEALTH CARE EDUCATION/TRAINING PROGRAM

## 2024-08-04 PROCEDURE — 97163 PT EVAL HIGH COMPLEX 45 MIN: CPT

## 2024-08-04 RX ORDER — HYDROCODONE BITARTRATE AND ACETAMINOPHEN 5; 325 MG/1; MG/1
1 TABLET ORAL EVERY 6 HOURS PRN
Status: DISCONTINUED | OUTPATIENT
Start: 2024-08-04 | End: 2024-08-09 | Stop reason: HOSPADM

## 2024-08-04 RX ORDER — HYDROCODONE BITARTRATE AND ACETAMINOPHEN 5; 325 MG/1; MG/1
2 TABLET ORAL EVERY 6 HOURS PRN
Status: DISCONTINUED | OUTPATIENT
Start: 2024-08-04 | End: 2024-08-09 | Stop reason: HOSPADM

## 2024-08-04 RX ADMIN — AMLODIPINE BESYLATE 10 MG: 10 TABLET ORAL at 08:07

## 2024-08-04 RX ADMIN — INSULIN LISPRO 3 UNITS: 100 INJECTION, SOLUTION INTRAVENOUS; SUBCUTANEOUS at 12:11

## 2024-08-04 RX ADMIN — HYDRALAZINE HYDROCHLORIDE 100 MG: 50 TABLET ORAL at 13:59

## 2024-08-04 RX ADMIN — MICONAZOLE NITRATE: 2 POWDER TOPICAL at 22:31

## 2024-08-04 RX ADMIN — LEVOTHYROXINE SODIUM 75 MCG: 0.07 TABLET ORAL at 06:25

## 2024-08-04 RX ADMIN — GABAPENTIN 100 MG: 100 CAPSULE ORAL at 08:07

## 2024-08-04 RX ADMIN — HYDRALAZINE HYDROCHLORIDE 100 MG: 50 TABLET ORAL at 22:28

## 2024-08-04 RX ADMIN — GABAPENTIN 100 MG: 100 CAPSULE ORAL at 22:28

## 2024-08-04 RX ADMIN — HEPARIN SODIUM 5000 UNITS: 5000 INJECTION INTRAVENOUS; SUBCUTANEOUS at 13:59

## 2024-08-04 RX ADMIN — INSULIN LISPRO 3 UNITS: 100 INJECTION, SOLUTION INTRAVENOUS; SUBCUTANEOUS at 17:03

## 2024-08-04 RX ADMIN — MICONAZOLE NITRATE: 2 POWDER TOPICAL at 08:07

## 2024-08-04 RX ADMIN — HYDRALAZINE HYDROCHLORIDE 100 MG: 50 TABLET ORAL at 06:26

## 2024-08-04 RX ADMIN — INSULIN LISPRO 1 UNITS: 100 INJECTION, SOLUTION INTRAVENOUS; SUBCUTANEOUS at 08:07

## 2024-08-04 RX ADMIN — HYDROMORPHONE HYDROCHLORIDE 0.5 MG: 1 INJECTION, SOLUTION INTRAMUSCULAR; INTRAVENOUS; SUBCUTANEOUS at 09:20

## 2024-08-04 RX ADMIN — HEPARIN SODIUM 5000 UNITS: 5000 INJECTION INTRAVENOUS; SUBCUTANEOUS at 22:28

## 2024-08-04 RX ADMIN — HYDROCODONE BITARTRATE AND ACETAMINOPHEN 2 TABLET: 5; 325 TABLET ORAL at 22:30

## 2024-08-04 RX ADMIN — SODIUM CHLORIDE, PRESERVATIVE FREE 10 ML: 5 INJECTION INTRAVENOUS at 22:29

## 2024-08-04 RX ADMIN — HYDROCODONE BITARTRATE AND ACETAMINOPHEN 2 TABLET: 5; 325 TABLET ORAL at 15:53

## 2024-08-04 RX ADMIN — MEROPENEM 500 MG: 500 INJECTION, POWDER, FOR SOLUTION INTRAVENOUS at 15:56

## 2024-08-04 RX ADMIN — HEPARIN SODIUM 5000 UNITS: 5000 INJECTION INTRAVENOUS; SUBCUTANEOUS at 06:25

## 2024-08-04 RX ADMIN — MEROPENEM 500 MG: 500 INJECTION, POWDER, FOR SOLUTION INTRAVENOUS at 03:53

## 2024-08-04 RX ADMIN — GABAPENTIN 100 MG: 100 CAPSULE ORAL at 13:59

## 2024-08-04 ASSESSMENT — PAIN - FUNCTIONAL ASSESSMENT
PAIN_FUNCTIONAL_ASSESSMENT: PREVENTS OR INTERFERES SOME ACTIVE ACTIVITIES AND ADLS

## 2024-08-04 ASSESSMENT — PAIN DESCRIPTION - DESCRIPTORS
DESCRIPTORS: STABBING
DESCRIPTORS: STABBING
DESCRIPTORS: NAGGING;DISCOMFORT

## 2024-08-04 ASSESSMENT — PAIN SCALES - GENERAL
PAINLEVEL_OUTOF10: 8
PAINLEVEL_OUTOF10: 7
PAINLEVEL_OUTOF10: 10
PAINLEVEL_OUTOF10: 8
PAINLEVEL_OUTOF10: 8

## 2024-08-04 ASSESSMENT — PAIN DESCRIPTION - ORIENTATION
ORIENTATION: RIGHT

## 2024-08-04 ASSESSMENT — PAIN SCALES - WONG BAKER: WONGBAKER_NUMERICALRESPONSE: NO HURT

## 2024-08-04 ASSESSMENT — PAIN DESCRIPTION - ONSET: ONSET: ON-GOING

## 2024-08-04 ASSESSMENT — PAIN DESCRIPTION - PAIN TYPE: TYPE: ACUTE PAIN

## 2024-08-04 ASSESSMENT — PAIN DESCRIPTION - FREQUENCY: FREQUENCY: CONTINUOUS

## 2024-08-04 ASSESSMENT — PAIN DESCRIPTION - LOCATION
LOCATION: SHOULDER

## 2024-08-04 NOTE — PROGRESS NOTES
Nephrology Progress Note  8/4/2024 8:37 AM        Subjective:   Admit Date: 7/31/2024  PCP: Jacobo Zazueta MD    Interval History: Pain is better controlled    Diet: Probably some    ROS: No overt shortness of breath  Urine output recorded only 200 cc he has a suprapubic catheter-    Low-grade fever his Tmax is 100.2, acceptable blood pressure      Data:     Current meds:    gabapentin  100 mg Oral TID    heparin (porcine)  5,000 Units SubCUTAneous 3 times per day    amLODIPine  10 mg Oral Daily    hydrALAZINE  100 mg Oral 3 times per day    insulin lispro  0-4 Units SubCUTAneous TID WC    insulin lispro  0-4 Units SubCUTAneous Nightly    levothyroxine  75 mcg Oral Daily    [Held by provider] torsemide  20 mg Oral Daily    sodium chloride flush  5-40 mL IntraVENous 2 times per day    meropenem  500 mg IntraVENous Q12H    miconazole   Topical BID      dextrose      sodium chloride 25 mL (08/03/24 0405)         I/O last 3 completed shifts:  In: 1210 [P.O.:1200; I.V.:10]  Out: 700 [Urine:700]    CBC:   Recent Labs     08/02/24  1051 08/03/24  0911   WBC 7.5 9.0   HGB 8.1* 8.1*    170          Recent Labs     08/02/24  1051 08/03/24  0911    139   K 5.5* 5.3*    102   CO2 23 22   BUN 55* 61*   CREATININE 3.1* 3.3*   GLUCOSE 267* 280*       Lab Results   Component Value Date    CALCIUM 8.6 08/03/2024    PHOS 3.5 08/03/2024       Objective:     Vitals: BP (!) 140/52   Pulse 89   Temp 98.2 °F (36.8 °C) (Oral)   Resp 20   Ht 1.727 m (5' 8\")   Wt 75.8 kg (167 lb)   SpO2 94%   BMI 25.39 kg/m² ,    General appearance: He was alert, awake and oriented  HEENT: Striking conjunctival pallor at least 3+, no scleral icterus  Neck: Supple he has a sling in the right arm  Lungs: No gross crackles although only anterior exam  Heart: Seemed regular rate and rhythm  Abdomen: Soft-he has a suprapubic catheter  Extremities: No gross edema      Problem List :         Impression :     Acute kidney injury on top

## 2024-08-04 NOTE — PROGRESS NOTES
.    V2.0  [facility] Follow Up [place]      Name:  Greg Easton /Age/Sex: 1940  (83 y.o. male)   MRN & CSN:  [id number] & [id number] Encounter Date/Time: 2024 9:37 AM EDT    Location:  [facility] PCP: Jacobo Zazueta MD       Hospital Day: 5      This is a telemedicine consult requested by: Dr. Peralta  I have Personally reviewed Lab Studies, Imaging, and discussed with Dr. Peralta       Physician Location: [place] from home   Patient Location: [facility]     Assessment and Plan:    # ESBL E. Coli Complicated UTI with BSI: UA w/ significant pyuria with CT Ap showing inflammatory changes on L-kindey at J-stent and mild on R kidney with no hydronephrosis. Scr baseline.   # Syncopal Episode  #Hx of MDR E. Coli          -C/w Meropenem day 5 of 10  -Will need a total of 10-days for the complicated UTI and bacteremia        ID Will sign off at this time  Please page/call with any further questions      Subjective:     Chief Complaint: Fall and Shoulder Injury        Afebrile, w/o white count        Objective:     Intake/Output Summary (Last 24 hours) at 2024 0937  Last data filed at 2024 0808  Gross per 24 hour   Intake 1570 ml   Output 200 ml   Net 1370 ml        Vitals:   Vitals:    24 0920   BP:    Pulse:    Resp: 20   Temp:    SpO2:        Physical Exam:     Physical Exam not performed as this was a Tele-Consult    Medications:   Medications:    gabapentin  100 mg Oral TID    heparin (porcine)  5,000 Units SubCUTAneous 3 times per day    amLODIPine  10 mg Oral Daily    hydrALAZINE  100 mg Oral 3 times per day    insulin lispro  0-4 Units SubCUTAneous TID WC    insulin lispro  0-4 Units SubCUTAneous Nightly    levothyroxine  75 mcg Oral Daily    [Held by provider] torsemide  20 mg Oral Daily    sodium chloride flush  5-40 mL IntraVENous 2 times per day    meropenem  500 mg IntraVENous Q12H    miconazole   Topical BID      Infusions:    dextrose  Glom Filt Rate 18 (L) >60 mL/min/1.73m2    Calcium 8.7 8.3 - 10.6 MG/DL        Imaging/Diagnostics Last 24 Hours   CT CHEST WO CONTRAST    Result Date: 8/2/2024  CT CHEST INDICATION: Weight loss. Multiple 2D axial images were obtained through the chest without IV contrast. Radiation dose reduction techniques were used for this study:  All CT scans performed at this facility use one or all of the following: Automated exposure control, adjustment of the mA and/or kVp according to patient's size, iterative reconstruction. COMPARISON: None FINDINGS: - LUNGS: No infiltrates, masses, or effusions. 6 mm calcified granuloma right upper lobe. - MEDIASTINUM/AXILLA: No significant adenopathy. Tiny calcified right hilar lymph nodes. - HEART/VESSELS: Normal. - CHEST WALL/BONES: Acute displaced and comminuted fracture of the proximal right humerus. Schmorl's node along the inferior endplate of T12. Nondisplaced fracture right lateral sixth rib. - UPPER ABDOMEN: No acute findings. Tiny calcified splenic granulomas.     Nondisplaced fracture right lateral sixth rib. Acute displaced and comminuted fracture of the proximal right humerus. Sequela of old granulomatous disease. Electronically signed by Thomas Truong      Electronically signed by Randy Rowe MD on 8/4/2024 at 9:37 AM

## 2024-08-04 NOTE — PROGRESS NOTES
Occupational Therapy    Occupational Therapy Treatment Note    Name: Greg Easton MRN: [id number] :   1940   Date:  2024   Admission Date: 2024 Room:  [address] [place] [address]       Restrictions/Precautions:          General Precautions, Fall Risk, NWB Rt UE with sling, contact precautions        Communication with other providers: PT for safety and activity tolerance      Subjective:  Patient states:  \"This is too much\"  Pain:   Location, Type, Intensity (0/10 to 10/10):  R shoulder pain, did not rate but reports     Objective:    Observation: supine in bed, reluctantly agreeable with encouragement   Objective Measures:  vitals stable on room air    Treatment, including education:    Therapeutic Activity Training:   Therapeutic activity training was instructed today.  Cues were given for safety, sequence, UE/LE placement, awareness, and balance.    Activities performed today included bed mobility training, sup-sit.      Supine to sitting EOB w/ mod Ax2, Vcs for initiation and sequencing.   Static EOB sitting w/ mod A d/t consistent L side leaning, unable to self correct despite Vcs for attention to posture. Pt tolerated EOB sitting ~4 min before requesting return to supine. Encouraged STS transfer, pt refused stating \"let me lay down\".  Max A x2 sit to supine, max Ax2 repositioning in bed.     Left supine in bed w/ all needs met, alarm on.       Assessment / Impression:    Patient's tolerance of treatment: Fair  Adverse Reaction: None  Significant change in status and impact: None  Barriers to improvement: pain, activity tolerance      Plan for Next Session:    Continue w/ OT POC and functional goals, address safety/independence w/ ADLs and transfers/mobility.       Time in:  1253  Time out:  1307  Timed treatment minutes:  14  Total treatment time:  14      Electronically signed by:      Deirdre Ayala OT

## 2024-08-04 NOTE — PROGRESS NOTES
loosening or fracture. Near complete opacification of the right maxillary sinus.     No CT evidence of acute intracranial or cervical spine abnormality. Electronically signed by Jimmy Thornton       Recent Results (from the past 24 hour(s))   POCT Glucose    Collection Time: 08/03/24  4:00 PM   Result Value Ref Range    POC Glucose 320 (H) 70 - 99 MG/DL   POCT Glucose    Collection Time: 08/03/24  8:05 PM   Result Value Ref Range    POC Glucose 312 (H) 70 - 99 MG/DL   POCT Glucose    Collection Time: 08/04/24  6:28 AM   Result Value Ref Range    POC Glucose 216 (H) 70 - 99 MG/DL   CBC with Auto Differential    Collection Time: 08/04/24  8:30 AM   Result Value Ref Range    WBC 8.0 4.0 - 10.5 K/CU MM    RBC 2.72 (L) 4.6 - 6.2 M/CU MM    Hemoglobin 8.3 (L) 13.5 - 18.0 GM/DL    Hematocrit 26.5 (L) 42 - 52 %    MCV 97.4 78 - 100 FL    MCH 30.5 27 - 31 PG    MCHC 31.3 (L) 32.0 - 36.0 %    RDW 15.4 (H) 11.7 - 14.9 %    Platelets 200 140 - 440 K/CU MM    MPV 10.7 7.5 - 11.1 FL    Differential Type AUTOMATED DIFFERENTIAL     Neutrophils % 63.1 36 - 66 %    Lymphocytes % 19.6 (L) 24 - 44 %    Monocytes % 10.2 (H) 0 - 4 %    Eosinophils % 6.4 (H) 0 - 3 %    Basophils % 0.4 0 - 1 %    Neutrophils Absolute 5.0 K/CU MM    Lymphocytes Absolute 1.6 K/CU MM    Monocytes Absolute 0.8 K/CU MM    Eosinophils Absolute 0.5 K/CU MM    Basophils Absolute 0.0 K/CU MM    Nucleated RBC % 0.0 %    Total Nucleated RBC 0.0 K/CU MM    Total Immature Neutrophil 0.02 K/CU MM    Immature Neutrophil % 0.3 0 - 0.43 %   Magnesium    Collection Time: 08/04/24  8:30 AM   Result Value Ref Range    Magnesium 2.1 1.8 - 2.4 mg/dl   Phosphorus    Collection Time: 08/04/24  8:30 AM   Result Value Ref Range    Phosphorus 3.6 2.5 - 4.9 MG/DL   Basic Metabolic Panel    Collection Time: 08/04/24  8:30 AM   Result Value Ref Range    Sodium 136 135 - 145 MMOL/L    Potassium 4.6 3.5 - 5.1 MMOL/L    Chloride 99 99 - 110 mMol/L    CO2 23 21 - 32 MMOL/L    Anion Gap 14 7  - 16    Glucose 293 (H) 70 - 99 MG/DL    BUN 60 (H) 6 - 23 MG/DL    Creatinine 3.2 (H) 0.9 - 1.3 MG/DL    Est, Glom Filt Rate 18 (L) >60 mL/min/1.73m2    Calcium 8.7 8.3 - 10.6 MG/DL   POCT Glucose    Collection Time: 08/04/24 11:14 AM   Result Value Ref Range    POC Glucose 306 (H) 70 - 99 MG/DL       Results       Procedure Component Value Units Date/Time    Culture, Blood 1 [[id number]]  (Abnormal)  (Susceptibility) Collected: 08/01/24 0925    Order Status: Completed Specimen: Blood Updated: 08/04/24 0703     Specimen BLOOD     Special Requests 1 OUT OF 3 BOTTLES POSITIVE FOR E. COLI ESBL. NOTIFIED DR SHARMA 1412 8/2 AMORRIS MLS     Culture Final Report      ESCHERICHIA COLI ESBL POSITIVE for Isolated one of two sets CONTACT PRECAUTIONS INDICATED Carbapenem antibiotics are the best option for infections caused by ESBL producing organisms.  Other antibiotic classes are likely to result in treatment failure, even for organisms demonstrating in vitro susceptibility.      Narrative:      SETUP DATE/TIME:  08/01/2024 1014    Susceptibility        Escherichia coli ESBL      BACTERIAL SUSCEPTIBILITY PANEL USMAN      amoxicillin-clavulanate (f) <=8/4  Sensitive      ampicillin >16  Resistant      ampicillin-sulbactam <=4/2  Sensitive      ceFAZolin >16  Resistant      cefepime >16  Resistant      cefTRIAXone >32  Resistant      cefuroxime >16  Resistant      ciprofloxacin >2  Resistant      ertapenem <=0.5  Sensitive      gentamicin <=2  Sensitive      levofloxacin >4  Resistant      meropenem <=1  Sensitive      moxifloxacin >4  Resistant      trimethoprim-sulfamethoxazole <=0.5/9.5  Sensitive                           Culture, Blood 2 [[id number]] Collected: 08/01/24 0925    Order Status: Completed Specimen: Blood Updated: 08/04/24 0826     Specimen BLOOD     Special Requests NONE     Culture NO GROWTH AT 72 HOURS    Narrative:      SETUP DATE/TIME:  08/01/2024 1016    Culture, Urine [[id number]] Collected: 08/01/24

## 2024-08-04 NOTE — CONSULTS
Consult completed. Procedure/rationale explained to pt & consent obtained. #22ga Nexiva extra-long, 1.75\" PIV initiated using UltraSound-guided technique without difficulty/complications. Lab specimens drawn/labeled/sent. Pt tolerated well and no other c/o or needs noted or reported.

## 2024-08-04 NOTE — PROGRESS NOTES
Physical Therapy  Facility/Department: [address]  Physical Therapy Initial Assessment    Name: Greg Easton  : 1940  MRN: [id number]  Date of Service: 2024    Discharge Recommendations:    Encourage facility for moderate post-acute rehabilitation, anticipate 1-2 hours per day and 5 days per week.           Patient Diagnosis(es): The primary encounter diagnosis was Syncope, unspecified syncope type. Diagnoses of Fall, initial encounter, Fx humeral neck, right, closed, initial encounter, Complicated UTI (urinary tract infection), and Impaired ambulation were also pertinent to this visit.  Past Medical History:  has a past medical history of Acute kidney failure (HCC), Acute urinary tract infection, Anemia, Ataxia, Ataxia, Bacteremia, Candidiasis, Chronic combined systolic and diastolic congestive heart failure (HCC), Chronic kidney disease, Cognitive communication deficit, Diabetes mellitus (HCC), Extended spectrum beta lactamase (ESBL) resistance, Fusion of spine of cervical region, Gait disturbance, History of prostate cancer, History of tobacco use, Hydronephrosis, Hyperkalemia, Hyperlipidemia, Hypertension, Hypertensive heart disease with CHF (congestive heart failure) (HCC), Hypotension, Immunodeficiency (HCC), Metabolic encephalopathy, Muscle weakness, Osteoarthritis, Osteoarthritis, Secondary Hyperaldosteronism (HCC), Supraventricular tachycardia (HCC), and Tubulo-interstitial nephritis.  Past Surgical History:  has a past surgical history that includes Prostate surgery; other surgical history (2013); Colon surgery; Bladder surgery; Prostatectomy (); Bladder surgery (Left, 2022); Cystoscopy (Left, 2022); Cystoscopy (Left, 10/31/2023); change of bladder tube,complicated (2024); and Cystoscopy (Left, 2024).    Assessment   Body Structures, Functions, Activity Limitations Requiring Skilled Therapeutic Intervention: Decreased functional mobility ;Decreased ADL  Manual  ADL Assistance: Independent  Homemaking Assistance: Needs assistance  Homemaking Responsibilities: No (son manages household IADLs)  Ambulation Assistance: Independent (mod I with RW in house, uses wheelchair in community)  Transfer Assistance: Independent  Active : No (son drives)  Occupation: Retired  Vision/Hearing  Vision  Vision: Within Functional Limits  Hearing  Hearing: Exceptions to WFL  Hearing Exceptions: Hard of hearing/hearing concerns    Cognition   Orientation  Overall Orientation Status: Impaired  Orientation Level: Disoriented to time;Oriented to person  Cognition  Overall Cognitive Status: Exceptions  Arousal/Alertness: Appears intact  Following Commands: Inconsistently follows commands  Attention Span: Appears intact  Safety Judgement: Decreased awareness of need for safety;Decreased awareness of need for assistance  Insights: Impaired  Initiation: Impaired  Sequencing: Impaired     Objective   Temp: 97.9 °F (36.6 °C)  Pulse: 86  Heart Rate Source: Monitor  Respirations: 18  SpO2: 96 %  O2 Device: None (Room air)  BP: (!) 149/58  MAP (Calculated): 88  BP Location: Left upper arm  BP Method: Automatic  Patient Position: Semi fowlers        Gross Assessment  Sensation: Intact (BLEs)        Strength RLE  Comment: knee extension: 3+/5, ankle DF: 2/5  Strength LLE  Comment: knee extension: 3+/5, ankle DF: 2/5           Bed mobility  Supine to Sit: Maximum assistance;2 Person assistance (with verbal and tactile cues for BLE and LUE placement throughout transfer; with HOB elevated; with increased time for task completion)  Sit to Supine: Maximum assistance;2 Person assistance  Scooting: Dependent/Total           Balance  Posture: Poor (forward head, rounded shoulders, increased thoracic kyphosis)  Sitting - Static: Poor;+  Sitting - Dynamic: Poor;+           Therapeutic Activity Training:   Therapeutic activity training was instructed today.  Cues were given for safety, sequence, UE/LE

## 2024-08-04 NOTE — PLAN OF CARE
Problem: Discharge Planning  Goal: Discharge to home or other facility with appropriate resources  8/3/2024 2320 by Deepika Lai, RN  Outcome: Progressing  8/3/2024 1433 by Rina Harmon LPN  Outcome: Progressing     Problem: Safety - Adult  Goal: Free from fall injury  8/3/2024 2320 by Deepika Lai, RN  Outcome: Progressing  8/3/2024 1433 by Rina Harmon LPN  Outcome: Progressing     Problem: ABCDS Injury Assessment  Goal: Absence of physical injury  8/3/2024 2320 by Deepika Lai, RN  Outcome: Progressing  8/3/2024 1433 by Rina Harmon LPN  Outcome: Progressing     Problem: Pain  Goal: Verbalizes/displays adequate comfort level or baseline comfort level  8/3/2024 2320 by Deepika Lai RN  Outcome: Progressing  8/3/2024 1433 by Rina Harmon LPN  Outcome: Progressing     Problem: Nutrition Deficit:  Goal: Optimize nutritional status  8/3/2024 2320 by Deepika Lai RN  Outcome: Progressing  8/3/2024 1433 by Rina Harmon LPN  Outcome: Progressing

## 2024-08-04 NOTE — PLAN OF CARE
Problem: Discharge Planning  Goal: Discharge to home or other facility with appropriate resources  8/4/2024 1020 by Monica Ngo RN  Outcome: Progressing  8/3/2024 2320 by Deepika Lai RN  Outcome: Progressing     Problem: Safety - Adult  Goal: Free from fall injury  8/4/2024 1020 by Monica Ngo RN  Outcome: Progressing  8/3/2024 2320 by Deepika Lai RN  Outcome: Progressing     Problem: ABCDS Injury Assessment  Goal: Absence of physical injury  8/4/2024 1020 by Monica Ngo RN  Outcome: Progressing  8/3/2024 2320 by Deepika Lai RN  Outcome: Progressing     Problem: Pain  Goal: Verbalizes/displays adequate comfort level or baseline comfort level  8/4/2024 1020 by Monica Ngo RN  Outcome: Progressing  8/3/2024 2320 by Deepika Lai RN  Outcome: Progressing     Problem: Nutrition Deficit:  Goal: Optimize nutritional status  8/4/2024 1020 by Monica Ngo RN  Outcome: Progressing  8/3/2024 2320 by Deepika Lai RN  Outcome: Progressing

## 2024-08-05 PROBLEM — D64.9 CHRONIC ANEMIA: Status: ACTIVE | Noted: 2024-08-05

## 2024-08-05 PROBLEM — D73.89 SPLENIC CALCIFICATION: Status: ACTIVE | Noted: 2024-08-05

## 2024-08-05 LAB
ANION GAP SERPL CALCULATED.3IONS-SCNC: 11 MMOL/L (ref 7–16)
BASOPHILS ABSOLUTE: 0 K/CU MM
BASOPHILS RELATIVE PERCENT: 0.6 % (ref 0–1)
BUN SERPL-MCNC: 65 MG/DL (ref 6–23)
CALCIUM SERPL-MCNC: 8.2 MG/DL (ref 8.3–10.6)
CHLORIDE BLD-SCNC: 102 MMOL/L (ref 99–110)
CO2: 24 MMOL/L (ref 21–32)
CREAT SERPL-MCNC: 2.9 MG/DL (ref 0.9–1.3)
DIFFERENTIAL TYPE: ABNORMAL
EOSINOPHILS ABSOLUTE: 0.6 K/CU MM
EOSINOPHILS RELATIVE PERCENT: 8.3 % (ref 0–3)
GFR, ESTIMATED: 21 ML/MIN/1.73M2
GLUCOSE BLD-MCNC: 302 MG/DL (ref 70–99)
GLUCOSE BLD-MCNC: 331 MG/DL (ref 70–99)
GLUCOSE BLD-MCNC: 341 MG/DL (ref 70–99)
GLUCOSE BLD-MCNC: 342 MG/DL (ref 70–99)
GLUCOSE SERPL-MCNC: 252 MG/DL (ref 70–99)
HCT VFR BLD CALC: 22.6 % (ref 42–52)
HCT VFR BLD CALC: 25.5 % (ref 42–52)
HEMOGLOBIN: 6.9 GM/DL (ref 13.5–18)
HEMOGLOBIN: 8 GM/DL (ref 13.5–18)
IMMATURE NEUTROPHIL %: 0.3 % (ref 0–0.43)
LYMPHOCYTES ABSOLUTE: 1.9 K/CU MM
LYMPHOCYTES RELATIVE PERCENT: 26.5 % (ref 24–44)
MAGNESIUM: 2.2 MG/DL (ref 1.8–2.4)
MCH RBC QN AUTO: 30 PG (ref 27–31)
MCHC RBC AUTO-ENTMCNC: 30.5 % (ref 32–36)
MCV RBC AUTO: 98.3 FL (ref 78–100)
MONOCYTES ABSOLUTE: 0.7 K/CU MM
MONOCYTES RELATIVE PERCENT: 10.2 % (ref 0–4)
NEUTROPHILS ABSOLUTE: 3.8 K/CU MM
NEUTROPHILS RELATIVE PERCENT: 54.1 % (ref 36–66)
NUCLEATED RBC %: 0 %
PDW BLD-RTO: 15.4 % (ref 11.7–14.9)
PHOSPHORUS: 4.5 MG/DL (ref 2.5–4.9)
PLATELET # BLD: 178 K/CU MM (ref 140–440)
PMV BLD AUTO: 11 FL (ref 7.5–11.1)
POTASSIUM SERPL-SCNC: 4.9 MMOL/L (ref 3.5–5.1)
RBC # BLD: 2.3 M/CU MM (ref 4.6–6.2)
SODIUM BLD-SCNC: 137 MMOL/L (ref 135–145)
TOTAL IMMATURE NEUTOROPHIL: 0.02 K/CU MM
TOTAL NUCLEATED RBC: 0 K/CU MM
WBC # BLD: 7 K/CU MM (ref 4–10.5)

## 2024-08-05 PROCEDURE — 1200000000 HC SEMI PRIVATE

## 2024-08-05 PROCEDURE — 99231 SBSQ HOSP IP/OBS SF/LOW 25: CPT | Performed by: ORTHOPAEDIC SURGERY

## 2024-08-05 PROCEDURE — 85018 HEMOGLOBIN: CPT

## 2024-08-05 PROCEDURE — 94761 N-INVAS EAR/PLS OXIMETRY MLT: CPT

## 2024-08-05 PROCEDURE — 97530 THERAPEUTIC ACTIVITIES: CPT

## 2024-08-05 PROCEDURE — 6370000000 HC RX 637 (ALT 250 FOR IP): Performed by: INTERNAL MEDICINE

## 2024-08-05 PROCEDURE — 6370000000 HC RX 637 (ALT 250 FOR IP): Performed by: STUDENT IN AN ORGANIZED HEALTH CARE EDUCATION/TRAINING PROGRAM

## 2024-08-05 PROCEDURE — 6360000002 HC RX W HCPCS: Performed by: INTERNAL MEDICINE

## 2024-08-05 PROCEDURE — 99231 SBSQ HOSP IP/OBS SF/LOW 25: CPT | Performed by: PHYSICIAN ASSISTANT

## 2024-08-05 PROCEDURE — 86900 BLOOD TYPING SEROLOGIC ABO: CPT

## 2024-08-05 PROCEDURE — 80048 BASIC METABOLIC PNL TOTAL CA: CPT

## 2024-08-05 PROCEDURE — 86850 RBC ANTIBODY SCREEN: CPT

## 2024-08-05 PROCEDURE — 83735 ASSAY OF MAGNESIUM: CPT

## 2024-08-05 PROCEDURE — 85014 HEMATOCRIT: CPT

## 2024-08-05 PROCEDURE — 86901 BLOOD TYPING SEROLOGIC RH(D): CPT

## 2024-08-05 PROCEDURE — 82962 GLUCOSE BLOOD TEST: CPT

## 2024-08-05 PROCEDURE — 36415 COLL VENOUS BLD VENIPUNCTURE: CPT

## 2024-08-05 PROCEDURE — 2580000003 HC RX 258: Performed by: STUDENT IN AN ORGANIZED HEALTH CARE EDUCATION/TRAINING PROGRAM

## 2024-08-05 PROCEDURE — 6360000002 HC RX W HCPCS: Performed by: STUDENT IN AN ORGANIZED HEALTH CARE EDUCATION/TRAINING PROGRAM

## 2024-08-05 PROCEDURE — 93005 ELECTROCARDIOGRAM TRACING: CPT | Performed by: INTERNAL MEDICINE

## 2024-08-05 PROCEDURE — 84100 ASSAY OF PHOSPHORUS: CPT

## 2024-08-05 PROCEDURE — 86922 COMPATIBILITY TEST ANTIGLOB: CPT

## 2024-08-05 PROCEDURE — 85025 COMPLETE CBC W/AUTO DIFF WBC: CPT

## 2024-08-05 RX ORDER — SODIUM CHLORIDE 9 MG/ML
INJECTION, SOLUTION INTRAVENOUS PRN
Status: DISCONTINUED | OUTPATIENT
Start: 2024-08-05 | End: 2024-08-05

## 2024-08-05 RX ADMIN — INSULIN LISPRO 3 UNITS: 100 INJECTION, SOLUTION INTRAVENOUS; SUBCUTANEOUS at 12:31

## 2024-08-05 RX ADMIN — HEPARIN SODIUM 5000 UNITS: 5000 INJECTION INTRAVENOUS; SUBCUTANEOUS at 22:10

## 2024-08-05 RX ADMIN — SODIUM CHLORIDE, PRESERVATIVE FREE 10 ML: 5 INJECTION INTRAVENOUS at 09:33

## 2024-08-05 RX ADMIN — INSULIN LISPRO 3 UNITS: 100 INJECTION, SOLUTION INTRAVENOUS; SUBCUTANEOUS at 09:25

## 2024-08-05 RX ADMIN — MEROPENEM 500 MG: 500 INJECTION, POWDER, FOR SOLUTION INTRAVENOUS at 03:41

## 2024-08-05 RX ADMIN — GABAPENTIN 100 MG: 100 CAPSULE ORAL at 09:25

## 2024-08-05 RX ADMIN — HYDRALAZINE HYDROCHLORIDE 100 MG: 50 TABLET ORAL at 13:39

## 2024-08-05 RX ADMIN — MICONAZOLE NITRATE: 2 POWDER TOPICAL at 09:32

## 2024-08-05 RX ADMIN — HYDROCODONE BITARTRATE AND ACETAMINOPHEN 2 TABLET: 5; 325 TABLET ORAL at 22:10

## 2024-08-05 RX ADMIN — GABAPENTIN 100 MG: 100 CAPSULE ORAL at 13:39

## 2024-08-05 RX ADMIN — GABAPENTIN 100 MG: 100 CAPSULE ORAL at 22:10

## 2024-08-05 RX ADMIN — INSULIN LISPRO 3 UNITS: 100 INJECTION, SOLUTION INTRAVENOUS; SUBCUTANEOUS at 16:49

## 2024-08-05 RX ADMIN — SODIUM CHLORIDE, PRESERVATIVE FREE 10 ML: 5 INJECTION INTRAVENOUS at 03:39

## 2024-08-05 RX ADMIN — AMLODIPINE BESYLATE 10 MG: 10 TABLET ORAL at 09:25

## 2024-08-05 RX ADMIN — HYDROCODONE BITARTRATE AND ACETAMINOPHEN 2 TABLET: 5; 325 TABLET ORAL at 12:35

## 2024-08-05 RX ADMIN — INSULIN LISPRO 4 UNITS: 100 INJECTION, SOLUTION INTRAVENOUS; SUBCUTANEOUS at 22:22

## 2024-08-05 RX ADMIN — MEROPENEM 500 MG: 500 INJECTION, POWDER, FOR SOLUTION INTRAVENOUS at 19:02

## 2024-08-05 RX ADMIN — HEPARIN SODIUM 5000 UNITS: 5000 INJECTION INTRAVENOUS; SUBCUTANEOUS at 13:38

## 2024-08-05 RX ADMIN — HYDRALAZINE HYDROCHLORIDE 100 MG: 50 TABLET ORAL at 22:20

## 2024-08-05 RX ADMIN — MICONAZOLE NITRATE: 2 POWDER TOPICAL at 21:44

## 2024-08-05 RX ADMIN — LEVOTHYROXINE SODIUM 75 MCG: 0.07 TABLET ORAL at 05:56

## 2024-08-05 RX ADMIN — SODIUM CHLORIDE: 9 INJECTION, SOLUTION INTRAVENOUS at 03:40

## 2024-08-05 RX ADMIN — HYDRALAZINE HYDROCHLORIDE 100 MG: 50 TABLET ORAL at 05:56

## 2024-08-05 RX ADMIN — HEPARIN SODIUM 5000 UNITS: 5000 INJECTION INTRAVENOUS; SUBCUTANEOUS at 05:56

## 2024-08-05 ASSESSMENT — PAIN SCALES - WONG BAKER
WONGBAKER_NUMERICALRESPONSE: HURTS A LITTLE BIT
WONGBAKER_NUMERICALRESPONSE: NO HURT
WONGBAKER_NUMERICALRESPONSE: 0;2
WONGBAKER_NUMERICALRESPONSE: NO HURT

## 2024-08-05 ASSESSMENT — PAIN DESCRIPTION - LOCATION
LOCATION: SHOULDER
LOCATION: SHOULDER

## 2024-08-05 ASSESSMENT — PAIN DESCRIPTION - DESCRIPTORS
DESCRIPTORS: ACHING
DESCRIPTORS: THROBBING;STABBING

## 2024-08-05 ASSESSMENT — PAIN SCALES - GENERAL
PAINLEVEL_OUTOF10: 3
PAINLEVEL_OUTOF10: 6
PAINLEVEL_OUTOF10: 7
PAINLEVEL_OUTOF10: 2

## 2024-08-05 ASSESSMENT — PAIN DESCRIPTION - ORIENTATION
ORIENTATION: RIGHT
ORIENTATION: RIGHT

## 2024-08-05 NOTE — PROGRESS NOTES
In-Patient Progress Note    Patient:  Greg Easton 83 y.o. male MRN: [id number]     Date of Service: 8/5/2024    Hospital Day: 6      Chief complaint: had concerns including Fall and Shoulder Injury.      Subjective   Patient seen and examined in the morning. Patient states he has pain over Rt. Arm. No burning pain today.       See ROS    I have reviewed all pertinent PMHx, PSHx, FamHx, SocialHx, medications, and allergies and updated history as appropriate. I have reviewed images as appropriate.     Assessment and Plan   Greg Easton, a 83 y.o. male, with a history of Prostate cancer, urinary incontinence, chronic indwelling suprapubic cath, splenic granuloma, CKD IV, ambulatory dysfunction, IDDM, GERD, HFpEF, Hypothyroidism  was admitted on 7/31/2024 with complaints of had concerns including Fall and Shoulder Injury.    Assessment and Plan:  Pre-syncope vs Syncope 2/2 Likely UTI   Rt. Humerus Fracture. Non-operative per ortho. Continue PT/OT. Asked ortho to re-evaluate  ESBL E Coli and Pan-sensitive Proteus UTI with chronic indwelling suprapubic cath. Cath changed 2 days prior to presentation by urology. ID on board. Continue meropenem - plan for total of 10 days (end date 8/11/2024)  E Coli Bacteremia.   PAMELLA on CKD. Baseline Cr 2.1-2.3. Peaked at 3.3, now 2.9. Nephro on board.   Hyperkalemia. K 4.9  Splenic granuloma. Hem/onc on board.   Ambulatory dysfunction. Normally ambulates with walker  Chronic Normocytic Anemia 2/2 likely anemia of chronic disease. Baseline Hb 8-8.5. Lab showed 6.9 but repeat is back to baseline.   IDDM with Peripheral neuropathy. Now on gabapentin 100mg TID.   GERD   Urinary Incontinenece   H/O Prostate cancer   Chronic HFpEF  Hypothyroidism       # Peptic ulcer prophylaxis: -   # DVT Prophylaxis: Heparin TID  Jon, son, to help with decision making       Current living situation: Home   Expected Disposition: SNF  Estimated discharge date: Pending ortho re-eval. Possible d/c in  Resistant      ertapenem <=0.5  Sensitive      gentamicin <=2  Sensitive      levofloxacin >4  Resistant      meropenem <=1  Sensitive      moxifloxacin >4  Resistant      trimethoprim-sulfamethoxazole <=0.5/9.5  Sensitive                           Culture, Blood 2 [[id number]] Collected: 08/01/24 0925    Order Status: Completed Specimen: Blood Updated: 08/05/24 0825     Specimen BLOOD     Special Requests NONE     Culture NO GROWTH AT 4 DAYS    Narrative:      SETUP DATE/TIME:  08/01/2024 1016    Culture, Urine [[id number]] Collected: 08/01/24 0400    Order Status: Canceled Specimen: Urine, clean catch     Culture, Urine [[id number]]  (Abnormal)  (Susceptibility) Collected: 08/01/24 0005    Order Status: Completed Specimen: Urine, clean catch Updated: 08/03/24 0908     Specimen URINE CLEAN CATCH     Special Requests NONE     Culture Final Report      ESCHERICHIA COLI ESBL >100,000 CFU/ml CONTACT PRECAUTIONS INDICATED Carbapenem antibiotics are the best option for infections caused by ESBL producing organisms.  Other antibiotic classes are likely to result in treatment failure, even for organisms demonstrating in vitro susceptibility.        PROTEUS MIRABILIS 25,000 CFU/ml      CALL  doctor   tel. [phone],    Narrative:      SETUP DATE/TIME:  08/01/2024 0117    Susceptibility        Escherichia coli ESBL      BACTERIAL SUSCEPTIBILITY PANEL USMAN      amoxicillin-clavulanate (f) <=8/4  Sensitive      ampicillin >16  Resistant      ampicillin-sulbactam <=4/2  Sensitive      ceFAZolin >16  Resistant      cefepime >16  Resistant      cefTRIAXone >32  Resistant      cefuroxime >16  Resistant      ciprofloxacin >2  Resistant      ertapenem <=0.5  Sensitive      gentamicin <=2  Sensitive      levofloxacin >4  Resistant      meropenem <=1  Sensitive      nitrofurantoin >64  Resistant      trimethoprim-sulfamethoxazole <=0.5/9.5  Sensitive                       Susceptibility        Proteus mirabilis      BACTERIAL

## 2024-08-05 NOTE — PROGRESS NOTES
Physical Therapy    Physical Therapy Treatment Note  Name: Greg Easton MRN: [id number] :   1940   Date:  2024   Admission Date: 2024 Room:  [address] [place] [address]   Restrictions/Precautions:  Restrictions/Precautions  Restrictions/Precautions: Weight Bearing, Fall Risk, General Precautions     Upper Extremity Weight Bearing Restrictions  Right Upper Extremity Weight Bearing: Non Weight Bearing (sling)   Communication with other providers:    Subjective:  Patient states:  agreeable  Pain:   Location, Type, Intensity (0/10 to 10/10):  does not rate but very guarded and pain /c movement.    Objective:    Observation:  in bed  Treatment, including education/measures:  Adjusted brace for proper fit  Bed mob modA/maxAx1, increased time to complete to ensure RUE safety and comfort.  Sitting balance EOB /c focus on midline and reducing L lat trunk lean which pt does for guarding purposes and pain. Pt able to improve x~10' sitting EOB F- grade.  Seated LAQ, DF/PF, vc for proper form  STS sets /c mod-maxAx1 and multiple sets performed. SPT /c mod-maxAx1 per PTA /c pt able to pivot 6 steps /c LUE holding for balance, increased time to complete.  Stand balance x~1', modA P+ grade. Vc for postural improvement.  Safety  Patient left safely in the chair, with call light/phone in reach with alarm applied. Gait belt was used for transfers and gait.  Notified nurse of pt status and performance    Assessment / Impression:    Pt tolerates tx well, able to progress in all areas including bed mob/transfers and standing balance  Patient's tolerance of treatment:  Good   Adverse Reaction: na  Significant change in status and impact:  na  Barriers to improvement:  weakness and pain  Plan for Next Session:    Cont transfer training, sitting balance for trunk stability, gait when tolerable                Time in:  1122  Time out:  1200  Timed treatment minutes:  38  Total treatment time:  38    Previously filed  Yes

## 2024-08-05 NOTE — CONSENT
Informed Consent for Blood Component Transfusion Note    I have discussed with the patient the rationale for blood component transfusion; its benefits in treating or preventing fatigue, organ damage, or death; and its risk which includes mild transfusion reactions, rare risk of blood borne infection, or more serious but rare reactions. I have discussed the alternatives to transfusion, including the risk and consequences of not receiving transfusion. The patient had an opportunity to ask questions and had agreed to proceed with transfusion of blood components.    Electronically signed by VIVIANA Elizabeth CNP on 8/5/24 at 4:27 AM EDT

## 2024-08-05 NOTE — PROGRESS NOTES
Discussed treatment plans in detail with patient again today.    I reiterated the fact that I recommend nonoperative management at this time.  I am concerned about possible complications given his medical situation, underlying comorbidities, and poor bone quality.  In addition I believe that his fracture is reasonably well aligned for healing.  Will continue with the previous plan of sling for protection and immobilization.  Nonweightbearing right upper extremity.  Follow-up for repeat x-rays in approximately 2 to 3 weeks

## 2024-08-05 NOTE — PROGRESS NOTES
Physician Progress Note      PATIENT:               ANA ROMAN  [facility] #:                  [id number]  :                       1940  ADMIT DATE:       2024 9:46 PM  DISCH DATE:  RESPONDING  PROVIDER #:        Kristopher Peralta MD          QUERY TEXT:    Patient admitted with UTI. Noted to have severe malnutrition in RD note on   . If possible, please document in progress notes and discharge summary if   you are evaluating and /or treating any of the following:    The medical record reflects the following:  Risk Factors: 83 y.o. male with a pmh of chronic diastolic heart failure with   preserved ejection fraction, hypertension, hyperlipidemia insulin-dependent   diabetes mellitus type 2  Clinical Indicators:  -  Note - Malnutrition Status:  Severe   malnutrition. Admitted with shoulder pain. XR shoulder shows Mild displaced   comminuted fracture right humeral neck. PMH: DMII, OA, HTN, CKD4,   malnutrition. Currently on low phosphorus, carb controlled 4 diet? Pt was NPO   awaiting for possible surgery, Ortho notes conservative treatment with sling   and PT/OT. Diet advanced at this time, pt did not touch meal. Pt is R hand   dominant, will need to learn to use other arm for feeding, agreed to start   soft and bite sized diet. Son at bedside reports pt had been declini  Treatment: RD consult, oral supplement, daily weights    ASPEN Criteria:    https://aspenjournals.onlinelibrary.russo.com/doi/full/10.1177/084678446508201  5  Options provided:  -- This patient has protein calorie malnutrition severe.  -- Other - I will add my own diagnosis  -- Disagree - Not applicable / Not valid  -- Disagree - Clinically unable to determine / Unknown  -- Refer to Clinical Documentation Reviewer    PROVIDER RESPONSE TEXT:    This patient has severe protein calorie malnutrition.    Query created by: Silvio Conti on 2024 10:52 AM      QUERY TEXT:    Pt admitted with UTI.  Pt noted to have chronic indwelling  urinary catheter.   If possible, please document in the progress notes and discharge summary if   you are evaluating and/or treating any of the following:    The medical record reflects the following:  Risk Factors: 83 y.o. male with a pmh of chronic diastolic heart failure with   preserved ejection fraction hypertension hyperlipidemia insulin-dependent   diabetes mellitus type 2 urinary incontinence  Clinical Indicators:  8/1 - IM H+P - 83-year-old male with impaired ambulation   history of chronic indwelling suprapubic catheter.  History of MDRO E. coli   with admission in May 2024 was on meropenem not on antibiotics now presented   to the hospital after a syncopal episode at home patient has a history of full   syncope secondary to orthostatic patient came in with right-sided shoulder   pain and shoulder the patient had a fall without trauma to the head.  Urine   did seem infected in the ED uses walker at baseline unable to ambulate at   home. 8/2 - IM - E Coli and Proteus UTI with   Treatment: Nephro/Uro/ID consult, IV antibiotics, UA/BC.  Options provided:  -- This patient has UTI due to chronic indwelling urinary catheter.  -- Other - I will add my own diagnosis  -- Disagree - Not applicable / Not valid  -- Disagree - Clinically unable to determine / Unknown  -- Refer to Clinical Documentation Reviewer    PROVIDER RESPONSE TEXT:    UTI is due to the chronic indwelling urinary catheter.    Query created by: Silvio Conti on 8/5/2024 10:58 AM      Electronically signed by:  Kristopher Peralta MD 8/5/2024 3:57 PM

## 2024-08-05 NOTE — PROGRESS NOTES
dizziness or headache.  Did have syncope prior to admission.  CT abdomen pelvis this admission noted granulomatous disease spleen.  Per chart review this has been reported since 2002 as well as liver granuloma (CT A/P [facility]).  On personal review of imaging available in our PACS as well as reports these are similar appearing possibly very slightly increased limited by differences in imaging technique.  He presents also had a PET/CT in 2009 for similar lymphoma concern (available documentation is limited) which was negative.    Interval History    8/5/2024    Patient resting comfortably this morning.  No new complaints.  Hemoglobin was 6.9 this morning however 8.0 on recheck which is more in line with his priors.  Blood transfusion was canceled.    CT chest from 8/2/2024 reviewed.  Evidence of old granulomatous disease with no lymphadenopathy.    Past Medical History:   Diagnosis Date    Acute kidney failure (HCC)     Acute urinary tract infection 03/15/2012    Anemia     Ataxia 01/01/2010    Ataxia     Bacteremia     Candidiasis     Chronic combined systolic and diastolic congestive heart failure (HCC)     Chronic kidney disease     Cognitive communication deficit     Diabetes mellitus (HCC) 01/01/2011    type 2, controlled    Extended spectrum beta lactamase (ESBL) resistance     Fusion of spine of cervical region 10/01/2012    Gait disturbance 01/01/2011    History of prostate cancer 01/01/1996    adenocarcinoma    History of tobacco use 01/01/1959    Hydronephrosis     Hyperkalemia     Hyperlipidemia 01/01/2011    Hypertension     Hypertensive heart disease with CHF (congestive heart failure) (HCC)     Hypotension     Immunodeficiency (HCC)     Metabolic encephalopathy     Muscle weakness     Osteoarthritis 01/01/2011    Osteoarthritis     Secondary Hyperaldosteronism (HCC)     Supraventricular tachycardia (HCC)     Tubulo-interstitial nephritis        Past Surgical History:   Procedure Laterality Date     colonoscopy was done, seems to be within the last 10 years based on documentation.    Nutrition is following. May be related to age and chronic infections.     #Chronic Normocytic Anemia    Past workup has all been unremarkable.  Seems most likely related to his kidney disease and chronic infections. He has similar longstanding anemia without any abnormality in his other blood lines.  Basic workup consistent with anemia of chronic disease.    Have discussed bone marrow biopsy with him in the past which he has declined, can consider doing this in the future although may not change any management.      Recommend transfusion for Hgb <7    Remainder of care per primary and consulting teams.  We will continue to follow along, please call with any questions or concerns. Findings and plan of care were dicussed with patient in depth and all questions answered as able.

## 2024-08-05 NOTE — CARE COORDINATION
Chart reviewed. ARU unable to accept patient to poor tolerance of therapy. Referral called to Maira Baez with [facility]. Awaiting determination.    1120 - [facility] is able to clinically accept patient. Patient discussed in IDR. Patient is still painful. Hospitalist is going to have patient seen by ortho today for pain.  No precert needed.

## 2024-08-05 NOTE — PROGRESS NOTES
Nephrology Progress Note  8/5/2024 7:46 AM        Subjective:   Admit Date: 7/31/2024  PCP: Jacobo Zazueta MD    Interval History: Pain is controlled as long as he does not move    Diet: Some    ROS: No shortness of breath or confusion, urine output recorded only 500 cc  No fever and acceptable blood pressure    Data:     Current meds:    gabapentin  100 mg Oral TID    heparin (porcine)  5,000 Units SubCUTAneous 3 times per day    amLODIPine  10 mg Oral Daily    hydrALAZINE  100 mg Oral 3 times per day    insulin lispro  0-4 Units SubCUTAneous TID WC    insulin lispro  0-4 Units SubCUTAneous Nightly    levothyroxine  75 mcg Oral Daily    [Held by provider] torsemide  20 mg Oral Daily    sodium chloride flush  5-40 mL IntraVENous 2 times per day    meropenem  500 mg IntraVENous Q12H    miconazole   Topical BID      sodium chloride      dextrose      sodium chloride 25 mL/hr at 08/05/24 0340         I/O last 3 completed shifts:  In: 1090 [P.O.:1080; I.V.:10]  Out: 700 [Urine:700]    CBC:   Recent Labs     08/03/24  0911 08/04/24  0830 08/05/24  0331   WBC 9.0 8.0 7.0   HGB 8.1* 8.3* 6.9*    200 178          Recent Labs     08/03/24  0911 08/04/24  0830 08/05/24  0331    136 137   K 5.3* 4.6 4.9    99 102   CO2 22 23 24   BUN 61* 60* 65*   CREATININE 3.3* 3.2* 2.9*   GLUCOSE 280* 293* 252*       Lab Results   Component Value Date    CALCIUM 8.2 (L) 08/05/2024    PHOS 4.5 08/05/2024       Objective:     Vitals: BP (!) 146/59   Pulse 84   Temp 98.3 °F (36.8 °C) (Oral)   Resp 17   Ht 1.727 m (5' 8\")   Wt 75.8 kg (167 lb)   SpO2 96%   BMI 25.39 kg/m² ,    General appearance: Alert, awake and oriented  HEENT: At least 2+ conjunctival pallor or  Neck: Supple  Lungs: No gross crackles although only anterior exam is possible adding 1 or move him to create more pain  Heart: Seemed regular rate and rhythm  Abdomen: Soft, he does have a suprapubic catheter  Extremities: No gross edema on limited

## 2024-08-05 NOTE — DISCHARGE INSTR - COC
Continuity of Care Form    Patient Name: Greg Easton   :  1940  MRN:  [id number]    Admit date:  2024  Discharge date:  2024    Code Status Order: Full Code   Advance Directives:     Admitting Physician:  Kristopher Peralta MD  PCP: Jacobo Zazueta MD    Discharging Nurse: ISHMAEL Rao  Discharging Hospital Unit/Room#: 1119/1119-A  Discharging Unit Phone Number: [phone]    Emergency Contact:   Extended Emergency Contact Information  Primary Emergency Contact: Jon Easton  Home Phone: [phone]  Mobile Phone: [phone]  Relation: Child  Preferred language: English   needed? No  Secondary Emergency Contact: Axel Easton  Home Phone: [phone]  Relation: Child    Past Surgical History:  Past Surgical History:   Procedure Laterality Date    BLADDER SURGERY      BLADDER SURGERY Left 2022    CYSTOSCOPY LEFT STENT EXCHANGE performed by Bal Mckeon MD at [facility] OR    CHANGE OF BLADDER TUBE,COMPLICATED  2024    COLON SURGERY      CYSTOSCOPY Left 2022    LEFT CYSTOSCOPY URETERAL STENT EXCHANGE AND SUPRABUPIC PACE CATHETER EXCHANGE performed by Mirella Wilkins MD at [facility] OR    CYSTOSCOPY Left 10/31/2023    CYSTOSCOPY, LEFT URETERAL STENT EXCHANGE, SUPRAPUBIC TUBE EXCHANGE performed by Mirella Wilkins MD at [facility] OR    CYSTOSCOPY Left 2024    CYSTOSCOPY URETERAL STENT EXCHANGE performed by Mirella Wilkins MD at [facility] OR    OTHER SURGICAL HISTORY  2013    Cysto with removal of artificial sphinter and placement of suprapubic catheter.    PROSTATE SURGERY      PROSTATECTOMY      infection - had cancer       Immunization History:   Immunization History   Administered Date(s) Administered    COVID-19, MODERNA BLUE border, Primary or Immunocompromised, (age 12y+), IM, 100 mcg/0.5mL 2021, 2021, 2022    COVID-19, PFIZER Bivalent, DO NOT Dilute, (age 12y+), IM, 30 mcg/0.3 mL 2022       Active Problems:  Patient  HFpEF  Hypothyroidism       PHYSICIAN SIGNATURE:  Electronically signed by Kristopher Peralta MD on 8/9/24 at 11:29 AM EDT

## 2024-08-06 LAB
ANION GAP SERPL CALCULATED.3IONS-SCNC: 16 MMOL/L (ref 7–16)
BASOPHILS ABSOLUTE: 0 K/CU MM
BASOPHILS RELATIVE PERCENT: 0.4 % (ref 0–1)
BUN SERPL-MCNC: 70 MG/DL (ref 6–23)
CALCIUM SERPL-MCNC: 8.7 MG/DL (ref 8.3–10.6)
CHLORIDE BLD-SCNC: 99 MMOL/L (ref 99–110)
CO2: 23 MMOL/L (ref 21–32)
CREAT SERPL-MCNC: 3.2 MG/DL (ref 0.9–1.3)
CULTURE: NORMAL
DIFFERENTIAL TYPE: ABNORMAL
EOSINOPHILS ABSOLUTE: 0.6 K/CU MM
EOSINOPHILS RELATIVE PERCENT: 8.5 % (ref 0–3)
GFR, ESTIMATED: 18 ML/MIN/1.73M2
GLUCOSE BLD-MCNC: 184 MG/DL (ref 70–99)
GLUCOSE BLD-MCNC: 261 MG/DL (ref 70–99)
GLUCOSE BLD-MCNC: 285 MG/DL (ref 70–99)
GLUCOSE BLD-MCNC: 325 MG/DL (ref 70–99)
GLUCOSE BLD-MCNC: 356 MG/DL (ref 70–99)
GLUCOSE SERPL-MCNC: 225 MG/DL (ref 70–99)
HCT VFR BLD CALC: 26.2 % (ref 42–52)
HEMOGLOBIN: 7.9 GM/DL (ref 13.5–18)
IMMATURE NEUTROPHIL %: 0.5 % (ref 0–0.43)
LYMPHOCYTES ABSOLUTE: 1.8 K/CU MM
LYMPHOCYTES RELATIVE PERCENT: 24 % (ref 24–44)
Lab: NORMAL
MCH RBC QN AUTO: 30.5 PG (ref 27–31)
MCHC RBC AUTO-ENTMCNC: 30.2 % (ref 32–36)
MCV RBC AUTO: 101.2 FL (ref 78–100)
MONOCYTES ABSOLUTE: 0.7 K/CU MM
MONOCYTES RELATIVE PERCENT: 8.9 % (ref 0–4)
NEUTROPHILS ABSOLUTE: 4.2 K/CU MM
NEUTROPHILS RELATIVE PERCENT: 57.7 % (ref 36–66)
NUCLEATED RBC %: 0 %
PDW BLD-RTO: 15.6 % (ref 11.7–14.9)
PLATELET # BLD: 229 K/CU MM (ref 140–440)
PMV BLD AUTO: 10.9 FL (ref 7.5–11.1)
POTASSIUM SERPL-SCNC: 5.6 MMOL/L (ref 3.5–5.1)
RBC # BLD: 2.59 M/CU MM (ref 4.6–6.2)
SODIUM BLD-SCNC: 138 MMOL/L (ref 135–145)
SPECIMEN: NORMAL
TOTAL IMMATURE NEUTOROPHIL: 0.04 K/CU MM
TOTAL NUCLEATED RBC: 0 K/CU MM
WBC # BLD: 7.3 K/CU MM (ref 4–10.5)

## 2024-08-06 PROCEDURE — 36415 COLL VENOUS BLD VENIPUNCTURE: CPT

## 2024-08-06 PROCEDURE — 85025 COMPLETE CBC W/AUTO DIFF WBC: CPT

## 2024-08-06 PROCEDURE — 6370000000 HC RX 637 (ALT 250 FOR IP): Performed by: FAMILY MEDICINE

## 2024-08-06 PROCEDURE — 97530 THERAPEUTIC ACTIVITIES: CPT

## 2024-08-06 PROCEDURE — 2580000003 HC RX 258: Performed by: STUDENT IN AN ORGANIZED HEALTH CARE EDUCATION/TRAINING PROGRAM

## 2024-08-06 PROCEDURE — 6360000002 HC RX W HCPCS: Performed by: STUDENT IN AN ORGANIZED HEALTH CARE EDUCATION/TRAINING PROGRAM

## 2024-08-06 PROCEDURE — 1200000000 HC SEMI PRIVATE

## 2024-08-06 PROCEDURE — 82962 GLUCOSE BLOOD TEST: CPT

## 2024-08-06 PROCEDURE — 6370000000 HC RX 637 (ALT 250 FOR IP): Performed by: INTERNAL MEDICINE

## 2024-08-06 PROCEDURE — 6360000002 HC RX W HCPCS: Performed by: INTERNAL MEDICINE

## 2024-08-06 PROCEDURE — 6370000000 HC RX 637 (ALT 250 FOR IP): Performed by: STUDENT IN AN ORGANIZED HEALTH CARE EDUCATION/TRAINING PROGRAM

## 2024-08-06 PROCEDURE — 94761 N-INVAS EAR/PLS OXIMETRY MLT: CPT

## 2024-08-06 PROCEDURE — 80048 BASIC METABOLIC PNL TOTAL CA: CPT

## 2024-08-06 RX ADMIN — ONDANSETRON 4 MG: 2 INJECTION INTRAMUSCULAR; INTRAVENOUS at 13:14

## 2024-08-06 RX ADMIN — LEVOTHYROXINE SODIUM 75 MCG: 0.07 TABLET ORAL at 06:11

## 2024-08-06 RX ADMIN — AMLODIPINE BESYLATE 10 MG: 10 TABLET ORAL at 08:19

## 2024-08-06 RX ADMIN — HYDRALAZINE HYDROCHLORIDE 100 MG: 50 TABLET ORAL at 15:32

## 2024-08-06 RX ADMIN — HEPARIN SODIUM 5000 UNITS: 5000 INJECTION INTRAVENOUS; SUBCUTANEOUS at 15:32

## 2024-08-06 RX ADMIN — HYDROCODONE BITARTRATE AND ACETAMINOPHEN 2 TABLET: 5; 325 TABLET ORAL at 06:11

## 2024-08-06 RX ADMIN — SODIUM ZIRCONIUM CYCLOSILICATE 10 G: 10 POWDER, FOR SUSPENSION ORAL at 04:07

## 2024-08-06 RX ADMIN — SODIUM CHLORIDE, PRESERVATIVE FREE 10 ML: 5 INJECTION INTRAVENOUS at 20:50

## 2024-08-06 RX ADMIN — MICONAZOLE NITRATE: 2 POWDER TOPICAL at 08:18

## 2024-08-06 RX ADMIN — GABAPENTIN 100 MG: 100 CAPSULE ORAL at 20:51

## 2024-08-06 RX ADMIN — MICONAZOLE NITRATE: 2 POWDER TOPICAL at 20:59

## 2024-08-06 RX ADMIN — SODIUM ZIRCONIUM CYCLOSILICATE 10 G: 10 POWDER, FOR SUSPENSION ORAL at 08:17

## 2024-08-06 RX ADMIN — HYDRALAZINE HYDROCHLORIDE 100 MG: 50 TABLET ORAL at 06:11

## 2024-08-06 RX ADMIN — INSULIN LISPRO 4 UNITS: 100 INJECTION, SOLUTION INTRAVENOUS; SUBCUTANEOUS at 18:26

## 2024-08-06 RX ADMIN — GABAPENTIN 100 MG: 100 CAPSULE ORAL at 15:32

## 2024-08-06 RX ADMIN — HEPARIN SODIUM 5000 UNITS: 5000 INJECTION INTRAVENOUS; SUBCUTANEOUS at 20:50

## 2024-08-06 RX ADMIN — INSULIN LISPRO 2 UNITS: 100 INJECTION, SOLUTION INTRAVENOUS; SUBCUTANEOUS at 08:18

## 2024-08-06 RX ADMIN — SODIUM CHLORIDE, PRESERVATIVE FREE 10 ML: 5 INJECTION INTRAVENOUS at 08:18

## 2024-08-06 RX ADMIN — MEROPENEM 500 MG: 500 INJECTION, POWDER, FOR SOLUTION INTRAVENOUS at 04:06

## 2024-08-06 RX ADMIN — HEPARIN SODIUM 5000 UNITS: 5000 INJECTION INTRAVENOUS; SUBCUTANEOUS at 06:10

## 2024-08-06 RX ADMIN — HYDRALAZINE HYDROCHLORIDE 100 MG: 50 TABLET ORAL at 20:51

## 2024-08-06 RX ADMIN — GABAPENTIN 100 MG: 100 CAPSULE ORAL at 08:19

## 2024-08-06 ASSESSMENT — PAIN DESCRIPTION - DESCRIPTORS
DESCRIPTORS: ACHING
DESCRIPTORS: ACHING

## 2024-08-06 ASSESSMENT — PAIN SCALES - GENERAL
PAINLEVEL_OUTOF10: 6
PAINLEVEL_OUTOF10: 4
PAINLEVEL_OUTOF10: 5

## 2024-08-06 ASSESSMENT — PAIN SCALES - WONG BAKER
WONGBAKER_NUMERICALRESPONSE: HURTS A LITTLE BIT

## 2024-08-06 ASSESSMENT — PAIN DESCRIPTION - LOCATION
LOCATION: SHOULDER
LOCATION: HAND;SHOULDER

## 2024-08-06 ASSESSMENT — PAIN DESCRIPTION - ORIENTATION
ORIENTATION: RIGHT;LEFT
ORIENTATION: RIGHT

## 2024-08-06 NOTE — PROGRESS NOTES
Physical Therapy    Physical Therapy Treatment Note  Name: Greg Easton MRN: [id number] :   1940   Date:  2024   Admission Date: 2024 Room:  [address] [place] [address]   Restrictions/Precautions:  Restrictions/Precautions  Restrictions/Precautions: Weight Bearing, Fall Risk, General Precautions     Upper Extremity Weight Bearing Restrictions  Right Upper Extremity Weight Bearing: Non Weight Bearing (sling)   Communication with other providers:    Subjective:  Patient states:  agreeable  Pain:   Location, Type, Intensity (0/10 to 10/10):  guarded /c sling donned    Objective:    Observation:  in bed  Treatment, including education/measures:  Bed mob modA/maxAx1, pt initiates /c LE's, increased time to complete  Sitting balance EOB x~8' F- grade, min cues for posture  Stand balance F-/P+ once TYSHAWN estab, max cues throughout for UE/LE placement and posture x~45\". X multiple stands performed  STS sets /c modAx1 and multiple sets performed. SPT /c mod-maxAx1 /c pivot steps and ~4step fwd as well.  Gait training x~6 step fwd modA-maxAx1 for upright posture and balance/wt shifting.   Dec step length/ht.  Increased time to complete d/t RUE pain and guarded posture.  Safety  Patient left safely in the chair, with call light/phone in reach with alarm applied. Gait belt was used for transfers and gait.      Safety  Patient left safely in the chair, with call light/phone in reach with alarm applied. Gait belt was used for transfers and gait.    Assessment / Impression:    Patient's tolerance of treatment:  Good   Adverse Reaction: na  Significant change in status and impact:  na  Barriers to improvement:  weakness and pain  Plan for Next Session:    Cont transfer training. Gait and stand balance focus                Time in:  1056  Time out:  1120  Timed treatment minutes:  24  Total treatment time:  24    Previously filed items:  Social/Functional History  Lives With: Son (who is not home during the day)  Type of Home:

## 2024-08-06 NOTE — PROGRESS NOTES
Nephrology Progress Note  8/6/2024 7:12 AM        Subjective:   Admit Date: 7/31/2024  PCP: Jacobo Zazueta MD    Interval History: Pain controlled    Diet: Some    ROS: Urine output not recorded, except for blood pressure as no fever    Data:     Current meds:    sodium zirconium cyclosilicate  10 g Oral TID    gabapentin  100 mg Oral TID    heparin (porcine)  5,000 Units SubCUTAneous 3 times per day    amLODIPine  10 mg Oral Daily    hydrALAZINE  100 mg Oral 3 times per day    insulin lispro  0-4 Units SubCUTAneous TID WC    insulin lispro  0-4 Units SubCUTAneous Nightly    levothyroxine  75 mcg Oral Daily    [Held by provider] torsemide  20 mg Oral Daily    sodium chloride flush  5-40 mL IntraVENous 2 times per day    miconazole   Topical BID      dextrose      sodium chloride 25 mL/hr at 08/05/24 0340         I/O last 3 completed shifts:  In: -   Out: 175 [Urine:175]    CBC:   Recent Labs     08/03/24  0911 08/04/24  0830 08/05/24  0331 08/05/24  0839   WBC 9.0 8.0 7.0  --    HGB 8.1* 8.3* 6.9* 8.0*    200 178  --           Recent Labs     08/04/24  0830 08/05/24  0331 08/06/24  0131    137 138   K 4.6 4.9 5.6*   CL 99 102 99   CO2 23 24 23   BUN 60* 65* 70*   CREATININE 3.2* 2.9* 3.2*   GLUCOSE 293* 252* 225*       Lab Results   Component Value Date    CALCIUM 8.7 08/06/2024    PHOS 4.5 08/05/2024       Objective:     Vitals: BP (!) 145/55   Pulse 83   Temp 98.6 °F (37 °C) (Oral)   Resp 18   Ht 1.727 m (5' 8\")   Wt 75.8 kg (167 lb)   SpO2 95%   BMI 25.39 kg/m² ,    General appearance: Alert, awake and oriented  HEENT: At least 2+ conjunctival pallor  Neck: Supple with right arm sling  Lungs: No crackles anteriorly Limited exam  Heart: Send regular rate and rhythm  Abdomen: Soft, suprapubic catheter although I could not examine it  Extremities: Has trace bilateral leg edema      Problem List :         Impression :     Acute kidney disease with underlying chronic kidney stage G4 A3-urine

## 2024-08-06 NOTE — PROGRESS NOTES
Occupational Therapy    Occupational Therapy Treatment Note    Name: Greg Easton MRN: [id number] :   1940   Date:  2024   Admission Date: 2024 Room:  [address] [place] [address]     Restrictions/Precautions:  Restrictions/Precautions  Restrictions/Precautions: Weight Bearing, Fall Risk, General Precautions     Upper Extremity Weight Bearing Restrictions  Right Upper Extremity Weight Bearing: Non Weight Bearing (sling)     Communication with other providers: RN approved session.     Subjective:  Patient states:  Pt agreeable to therapy session.   Pain:   Location, Type, Intensity (0/10 to 10/10):  noted pain did not rate.     Objective:    Observation: Pt supine in bed upon arrival.    Objective Measures:  Pt alert and oriented.     Treatment, including education:  Therapeutic Activity Training:   Therapeutic activity training was instructed today.  Cues were given for safety, sequence, UE/LE placement, awareness, and balance.    Activities performed today included bed mobility training, sup-sit, sit-stand, SPT.    Pt supine in bed and completed sup to sit with MOD to MAX A x1. Pt seated edge of bed approx 8-10 minutes with fair - balance with posterior lean and MAX cues for hand placement.  Pt completed sit to stand from edge of bed with MOD A x1 and seated edge of bed. Pt RUE arm sligh adjusted to have gown underneath for skin protection and proper fit. Pt completed functional mobility with MOD A to MAX A x1 with close chair follow with 1 seated rest break. Pt left seated in chair with all needs met and call light in reach.     Assessment / Impression:    Patient's tolerance of treatment: fair  Adverse Reaction: None  Significant change in status and impact: Improved from initial evaluation  Barriers to improvement: None noted      Plan for Next Session:    Continue OT POC    Time in:  1056  Time out:  1120  Timed treatment minutes:  24  Total treatment time:  24      Electronically signed by:      Olivia Wyatt  KHARI

## 2024-08-06 NOTE — PLAN OF CARE
Problem: Discharge Planning  Goal: Discharge to home or other facility with appropriate resources  8/6/2024 0316 by Meri Lo LPN  Outcome: Progressing  8/5/2024 1811 by Denisse Frank RN  Outcome: Progressing     Problem: Safety - Adult  Goal: Free from fall injury  8/6/2024 0316 by Meri Lo LPN  Outcome: Progressing  8/5/2024 1811 by Denisse Frank RN  Outcome: Progressing     Problem: ABCDS Injury Assessment  Goal: Absence of physical injury  8/6/2024 0316 by Meri Lo LPN  Outcome: Progressing  8/5/2024 1811 by Denisse Frank RN  Outcome: Progressing     Problem: Pain  Goal: Verbalizes/displays adequate comfort level or baseline comfort level  8/6/2024 0316 by Meri Lo LPN  Outcome: Progressing  8/5/2024 1811 by Denisse Frank RN  Outcome: Progressing     Problem: Nutrition Deficit:  Goal: Optimize nutritional status  8/6/2024 0316 by Meri Lo LPN  Outcome: Progressing  8/5/2024 1811 by Denisse Frank RN  Outcome: Progressing     Problem: Chronic Conditions and Co-morbidities  Goal: Patient's chronic conditions and co-morbidity symptoms are monitored and maintained or improved  8/6/2024 0316 by Meri Lo LPN  Outcome: Progressing  8/5/2024 1811 by Denisse Frank RN  Outcome: Progressing

## 2024-08-06 NOTE — PLAN OF CARE
Brief Hematology Oncology Plan of Care:    Imaging and lab data reviewed.  No immediate concern for malignancy. Hgb has remained stable with 6.9 on 8/5 appearing spurious.  Chronic anemia is exacerbated by infection and long bone fracture. We can follow-up his anemia in clinic and consider if he would want BMBx however has declined this in past.    [facility] Lan Lagunas 3-4 weeks after discharge.    We will follow peripherally, please call with any questions.    Emma Mcadams PA-C

## 2024-08-06 NOTE — PROGRESS NOTES
Comprehensive Nutrition Assessment    Type and Reason for Visit:  Reassess    Nutrition Recommendations/Plan:   Continue current diet  Continue diabetic oral nutrition supplement TID  Please encourage and document po intakes of all meals and supplements  Assist w/ meals as needed  Recommend strict insulin coverage; consult endocrinology for better glucose control as needed   Monitor weights, po intakes, glucose, POC     Malnutrition Assessment:  Malnutrition Status:  Severe malnutrition (08/01/24 7128)    Context:  Chronic Illness       Nutrition Assessment:    Pt receiving care at visit, ate only a few bites of a muffin, noting 2 unopened diabetic oral supplements on breakfast tray outside of room. Avg meal intake 57% and avg supplement intake 63%. Will reduce diabetic supplement to TID. Continue to follow at high nutrition risk.    Nutrition Related Findings:    +synthroid, insulin; K 5.6, glucose 225-341, GFR 18, Cr 3.2, BUN 70. no immediate concern for malignancy per onc notes Wound Type: None       Current Nutrition Intake & Therapies:    Average Meal Intake: 51-75%  Average Supplements Intake: 26-50%  ADULT DIET; Dysphagia - Soft and Bite Sized; 5 carb choices (75 gm/meal)  ADULT ORAL NUTRITION SUPPLEMENT; Breakfast, Lunch, Dinner; Diabetic Oral Supplement  ADULT ORAL NUTRITION SUPPLEMENT; Breakfast, Lunch, Dinner; Diabetic Oral Supplement    Anthropometric Measures:  Height: 172.7 cm (5' 8\")  Ideal Body Weight (IBW): 154 lbs (70 kg)    Admission Body Weight: 75.8 kg (167 lb)  Current Body Weight: 75.8 kg (167 lb 1.7 oz), 108.4 % IBW. Weight Source: Not Specified  Current BMI (kg/m2): 25.4  Usual Body Weight: 92 kg (202 lb 13.2 oz) (11/8/2023)  % Weight Change (Calculated): -17.7  Weight Adjustment For: No Adjustment                 BMI Categories: Overweight (BMI 25.0-29.9)    Estimated Daily Nutrient Needs:  Energy Requirements Based On: Formula  Weight Used for Energy Requirements: Current  Energy  (kcal/day): 9177-0590 (MSJ)  Weight Used for Protein Requirements: Ideal  Protein (g/day): 76-91 (1-1.2 g/kg)  Method Used for Fluid Requirements: 1 ml/kcal  Fluid (ml/day): 1800    Nutrition Diagnosis:   Severe malnutrition, In context of chronic illness related to  (reduced oral intakes/loss of appetite) as evidenced by poor intake prior to admission, weight loss greater than or equal to 10% in 6 months (<75% over 1 month; 18% in 8 months)    Nutrition Interventions:   Food and/or Nutrient Delivery: Continue Current Diet, Modify Oral Nutrition Supplement  Nutrition Education/Counseling: Survival skills/brief education completed (malnutrition packet given)  Coordination of Nutrition Care: Continue to monitor while inpatient, Coordination of Care  Plan of Care discussed with: pt/family, PS MD re: pcm dx    Goals:  Previous Goal Met: Progressing toward Goal(s)  Goals: PO intake 75% or greater, prior to discharge       Nutrition Monitoring and Evaluation:   Behavioral-Environmental Outcomes: None Identified  Food/Nutrient Intake Outcomes: Diet Advancement/Tolerance, Food and Nutrient Intake, Supplement Intake  Physical Signs/Symptoms Outcomes: Biochemical Data, Meal Time Behavior, Nutrition Focused Physical Findings, Weight    Discharge Planning:    Continue current diet, Continue Oral Nutrition Supplement     Noreen Lloyd RD  Contact: [phone]

## 2024-08-06 NOTE — PROGRESS NOTES
In-Patient Progress Note    Patient:  Greg Easton 83 y.o. male MRN: [id number]     Date of Service: 8/6/2024    Hospital Day: 7      Chief complaint: had concerns including Fall and Shoulder Injury.      Subjective   Patient seen and examined in the morning. Patient states he has pain over Rt. Arm. No burning pain over leg today.       See ROS    I have reviewed all pertinent PMHx, PSHx, FamHx, SocialHx, medications, and allergies and updated history as appropriate. I have reviewed images as appropriate.     Assessment and Plan   Greg Easton, a 83 y.o. male, with a history of Prostate cancer, urinary incontinence, chronic indwelling suprapubic cath, splenic granuloma, CKD IV, ambulatory dysfunction, IDDM, GERD, HFpEF, Hypothyroidism  was admitted on 7/31/2024 with complaints of had concerns including Fall and Shoulder Injury.    Assessment and Plan:  Pre-syncope vs Syncope 2/2 Likely UTI   Rt. Humerus Fracture. Non-operative per ortho. Continue PT/OT. Asked ortho to re-evaluate  ESBL E Coli and Pan-sensitive Proteus UTI with chronic indwelling suprapubic cath. Cath changed 2 days prior to presentation by urology. ID on board. Continue meropenem - plan for total of 10 days (end date 8/11/2024)  E Coli Bacteremia.   PAMELLA on CKD. Baseline Cr 2.1-2.3. Peaked at 3.3, now 3.2. Nephro on board.   Hyperkalemia. K 5.6. On Lokelma   Splenic granuloma. Hem/onc on board.   Ambulatory dysfunction. Normally ambulates with walker  Chronic Normocytic Anemia 2/2 likely anemia of chronic disease. Baseline Hb 8-8.5. Lab showed 6.9 but repeat is close to being back to baseline. Hem/onc on board   IDDM with Peripheral neuropathy. Now on gabapentin 100mg TID.   GERD   Urinary Incontinenece   H/O Prostate cancer   Chronic HFpEF  Hypothyroidism       # Peptic ulcer prophylaxis: -   # DVT Prophylaxis: Heparin TID  Jon, son, to help with decision making       Current living situation: Home   Expected Disposition: SNF  Estimated       cefepime <=2  Sensitive      cefTRIAXone <=1  Sensitive      cefuroxime <=4  Sensitive      ciprofloxacin <=0.25  Sensitive      ertapenem <=0.5  Sensitive      gentamicin <=2  Sensitive      levofloxacin <=0.5  Sensitive      meropenem <=1  Sensitive      nitrofurantoin >64  Resistant      piperacillin-tazobactam <=8  Sensitive      trimethoprim-sulfamethoxazole <=0.5/9.5  Sensitive                                       Electronically signed by Kristopher Peralta MD on 8/6/2024 at 1:20 PM

## 2024-08-07 LAB
ANION GAP SERPL CALCULATED.3IONS-SCNC: 15 MMOL/L (ref 7–16)
BASOPHILS ABSOLUTE: 0 K/CU MM
BASOPHILS RELATIVE PERCENT: 0.5 % (ref 0–1)
BUN SERPL-MCNC: 71 MG/DL (ref 6–23)
CALCIUM SERPL-MCNC: 8.8 MG/DL (ref 8.3–10.6)
CHLORIDE BLD-SCNC: 101 MMOL/L (ref 99–110)
CO2: 22 MMOL/L (ref 21–32)
CREAT SERPL-MCNC: 3 MG/DL (ref 0.9–1.3)
DIFFERENTIAL TYPE: ABNORMAL
EKG ATRIAL RATE: 91 BPM
EKG DIAGNOSIS: NORMAL
EKG P AXIS: 56 DEGREES
EKG P-R INTERVAL: 194 MS
EKG Q-T INTERVAL: 338 MS
EKG QRS DURATION: 70 MS
EKG QTC CALCULATION (BAZETT): 415 MS
EKG R AXIS: 0 DEGREES
EKG T AXIS: 36 DEGREES
EKG VENTRICULAR RATE: 91 BPM
EOSINOPHILS ABSOLUTE: 0.6 K/CU MM
EOSINOPHILS RELATIVE PERCENT: 7.5 % (ref 0–3)
GFR, ESTIMATED: 20 ML/MIN/1.73M2
GLUCOSE BLD-MCNC: 220 MG/DL (ref 70–99)
GLUCOSE BLD-MCNC: 268 MG/DL (ref 70–99)
GLUCOSE BLD-MCNC: 282 MG/DL (ref 70–99)
GLUCOSE BLD-MCNC: 295 MG/DL (ref 70–99)
GLUCOSE SERPL-MCNC: 179 MG/DL (ref 70–99)
HCT VFR BLD CALC: 22.2 % (ref 42–52)
HCT VFR BLD CALC: 28.8 % (ref 42–52)
HEMOGLOBIN: 6.6 GM/DL (ref 13.5–18)
HEMOGLOBIN: 8.9 GM/DL (ref 13.5–18)
IMMATURE NEUTROPHIL %: 0.3 % (ref 0–0.43)
LYMPHOCYTES ABSOLUTE: 1.9 K/CU MM
LYMPHOCYTES RELATIVE PERCENT: 25.3 % (ref 24–44)
MCH RBC QN AUTO: 30.1 PG (ref 27–31)
MCHC RBC AUTO-ENTMCNC: 29.7 % (ref 32–36)
MCV RBC AUTO: 101.4 FL (ref 78–100)
MONOCYTES ABSOLUTE: 0.7 K/CU MM
MONOCYTES RELATIVE PERCENT: 8.9 % (ref 0–4)
NEUTROPHILS ABSOLUTE: 4.2 K/CU MM
NEUTROPHILS RELATIVE PERCENT: 57.5 % (ref 36–66)
NUCLEATED RBC %: 0 %
PDW BLD-RTO: 15.6 % (ref 11.7–14.9)
PLATELET # BLD: 214 K/CU MM (ref 140–440)
PMV BLD AUTO: 11.3 FL (ref 7.5–11.1)
POTASSIUM SERPL-SCNC: 6 MMOL/L (ref 3.5–5.1)
RBC # BLD: 2.19 M/CU MM (ref 4.6–6.2)
SODIUM BLD-SCNC: 138 MMOL/L (ref 135–145)
TOTAL IMMATURE NEUTOROPHIL: 0.02 K/CU MM
TOTAL NUCLEATED RBC: 0 K/CU MM
WBC # BLD: 7.3 K/CU MM (ref 4–10.5)

## 2024-08-07 PROCEDURE — 93010 ELECTROCARDIOGRAM REPORT: CPT | Performed by: INTERNAL MEDICINE

## 2024-08-07 PROCEDURE — 94761 N-INVAS EAR/PLS OXIMETRY MLT: CPT

## 2024-08-07 PROCEDURE — 99222 1ST HOSP IP/OBS MODERATE 55: CPT | Performed by: INTERNAL MEDICINE

## 2024-08-07 PROCEDURE — 2580000003 HC RX 258: Performed by: STUDENT IN AN ORGANIZED HEALTH CARE EDUCATION/TRAINING PROGRAM

## 2024-08-07 PROCEDURE — 85025 COMPLETE CBC W/AUTO DIFF WBC: CPT

## 2024-08-07 PROCEDURE — 80048 BASIC METABOLIC PNL TOTAL CA: CPT

## 2024-08-07 PROCEDURE — 36415 COLL VENOUS BLD VENIPUNCTURE: CPT

## 2024-08-07 PROCEDURE — APPNB60 APP NON BILLABLE TIME 46-60 MINS: Performed by: LICENSED PRACTICAL NURSE

## 2024-08-07 PROCEDURE — 1200000000 HC SEMI PRIVATE

## 2024-08-07 PROCEDURE — 6360000002 HC RX W HCPCS: Performed by: STUDENT IN AN ORGANIZED HEALTH CARE EDUCATION/TRAINING PROGRAM

## 2024-08-07 PROCEDURE — 30233N1 TRANSFUSION OF NONAUTOLOGOUS RED BLOOD CELLS INTO PERIPHERAL VEIN, PERCUTANEOUS APPROACH: ICD-10-PCS | Performed by: INTERNAL MEDICINE

## 2024-08-07 PROCEDURE — 6370000000 HC RX 637 (ALT 250 FOR IP): Performed by: STUDENT IN AN ORGANIZED HEALTH CARE EDUCATION/TRAINING PROGRAM

## 2024-08-07 PROCEDURE — 6370000000 HC RX 637 (ALT 250 FOR IP): Performed by: INTERNAL MEDICINE

## 2024-08-07 PROCEDURE — 6360000002 HC RX W HCPCS: Performed by: INTERNAL MEDICINE

## 2024-08-07 PROCEDURE — P9016 RBC LEUKOCYTES REDUCED: HCPCS

## 2024-08-07 PROCEDURE — 82962 GLUCOSE BLOOD TEST: CPT

## 2024-08-07 PROCEDURE — 36430 TRANSFUSION BLD/BLD COMPNT: CPT

## 2024-08-07 PROCEDURE — 85014 HEMATOCRIT: CPT

## 2024-08-07 PROCEDURE — 6370000000 HC RX 637 (ALT 250 FOR IP): Performed by: FAMILY MEDICINE

## 2024-08-07 PROCEDURE — 85018 HEMOGLOBIN: CPT

## 2024-08-07 RX ORDER — SODIUM POLYSTYRENE SULFONATE 15 G/60ML
15 SUSPENSION ORAL; RECTAL ONCE
Status: COMPLETED | OUTPATIENT
Start: 2024-08-07 | End: 2024-08-07

## 2024-08-07 RX ORDER — SODIUM CHLORIDE 9 MG/ML
INJECTION, SOLUTION INTRAVENOUS PRN
Status: DISCONTINUED | OUTPATIENT
Start: 2024-08-07 | End: 2024-08-09 | Stop reason: HOSPADM

## 2024-08-07 RX ADMIN — INSULIN LISPRO 2 UNITS: 100 INJECTION, SOLUTION INTRAVENOUS; SUBCUTANEOUS at 19:09

## 2024-08-07 RX ADMIN — HYDROMORPHONE HYDROCHLORIDE 0.5 MG: 1 INJECTION, SOLUTION INTRAMUSCULAR; INTRAVENOUS; SUBCUTANEOUS at 21:09

## 2024-08-07 RX ADMIN — GABAPENTIN 100 MG: 100 CAPSULE ORAL at 09:36

## 2024-08-07 RX ADMIN — HYDRALAZINE HYDROCHLORIDE 100 MG: 50 TABLET ORAL at 21:09

## 2024-08-07 RX ADMIN — HYDROCODONE BITARTRATE AND ACETAMINOPHEN 1 TABLET: 5; 325 TABLET ORAL at 06:19

## 2024-08-07 RX ADMIN — SODIUM CHLORIDE, PRESERVATIVE FREE 10 ML: 5 INJECTION INTRAVENOUS at 09:36

## 2024-08-07 RX ADMIN — HEPARIN SODIUM 5000 UNITS: 5000 INJECTION INTRAVENOUS; SUBCUTANEOUS at 19:09

## 2024-08-07 RX ADMIN — HEPARIN SODIUM 5000 UNITS: 5000 INJECTION INTRAVENOUS; SUBCUTANEOUS at 21:10

## 2024-08-07 RX ADMIN — HYDRALAZINE HYDROCHLORIDE 100 MG: 50 TABLET ORAL at 19:10

## 2024-08-07 RX ADMIN — GABAPENTIN 100 MG: 100 CAPSULE ORAL at 22:14

## 2024-08-07 RX ADMIN — SODIUM ZIRCONIUM CYCLOSILICATE 10 G: 10 POWDER, FOR SUSPENSION ORAL at 06:54

## 2024-08-07 RX ADMIN — GABAPENTIN 100 MG: 100 CAPSULE ORAL at 19:10

## 2024-08-07 RX ADMIN — ACETAMINOPHEN 650 MG: 325 TABLET ORAL at 19:09

## 2024-08-07 RX ADMIN — AMLODIPINE BESYLATE 10 MG: 10 TABLET ORAL at 09:36

## 2024-08-07 RX ADMIN — INSULIN LISPRO 1 UNITS: 100 INJECTION, SOLUTION INTRAVENOUS; SUBCUTANEOUS at 09:36

## 2024-08-07 RX ADMIN — SODIUM ZIRCONIUM CYCLOSILICATE 10 G: 10 POWDER, FOR SUSPENSION ORAL at 19:08

## 2024-08-07 RX ADMIN — SODIUM CHLORIDE, PRESERVATIVE FREE 10 ML: 5 INJECTION INTRAVENOUS at 21:11

## 2024-08-07 RX ADMIN — MICONAZOLE NITRATE: 2 POWDER TOPICAL at 09:35

## 2024-08-07 RX ADMIN — LEVOTHYROXINE SODIUM 75 MCG: 0.07 TABLET ORAL at 06:19

## 2024-08-07 RX ADMIN — MICONAZOLE NITRATE: 2 POWDER TOPICAL at 21:28

## 2024-08-07 RX ADMIN — HYDRALAZINE HYDROCHLORIDE 100 MG: 50 TABLET ORAL at 06:18

## 2024-08-07 RX ADMIN — SODIUM POLYSTYRENE SULFONATE 15 G: 15 SUSPENSION ORAL; RECTAL at 21:26

## 2024-08-07 RX ADMIN — SODIUM ZIRCONIUM CYCLOSILICATE 10 G: 10 POWDER, FOR SUSPENSION ORAL at 22:14

## 2024-08-07 ASSESSMENT — PAIN DESCRIPTION - DESCRIPTORS
DESCRIPTORS: ACHING
DESCRIPTORS: ACHING;CRAMPING
DESCRIPTORS: ACHING

## 2024-08-07 ASSESSMENT — PAIN DESCRIPTION - LOCATION
LOCATION: SHOULDER
LOCATION: GENERALIZED

## 2024-08-07 ASSESSMENT — PAIN DESCRIPTION - ORIENTATION: ORIENTATION: LEFT

## 2024-08-07 ASSESSMENT — PAIN SCALES - GENERAL
PAINLEVEL_OUTOF10: 6
PAINLEVEL_OUTOF10: 0
PAINLEVEL_OUTOF10: 3
PAINLEVEL_OUTOF10: 0
PAINLEVEL_OUTOF10: 8
PAINLEVEL_OUTOF10: 0

## 2024-08-07 ASSESSMENT — PAIN - FUNCTIONAL ASSESSMENT
PAIN_FUNCTIONAL_ASSESSMENT: ACTIVITIES ARE NOT PREVENTED
PAIN_FUNCTIONAL_ASSESSMENT: PREVENTS OR INTERFERES SOME ACTIVE ACTIVITIES AND ADLS

## 2024-08-07 ASSESSMENT — PAIN SCALES - WONG BAKER: WONGBAKER_NUMERICALRESPONSE: HURTS A LITTLE BIT

## 2024-08-07 NOTE — PROGRESS NOTES
Normocephalic and atraumatic.      Right Ear: External ear normal.      Left Ear: External ear normal.      Nose: Nose normal.      Mouth/Throat:      Mouth: Mucous membranes are moist.      Pharynx: Oropharynx is clear.   Eyes:      General: No scleral icterus.        Right eye: No discharge.         Left eye: No discharge.      Conjunctiva/sclera: Conjunctivae normal.      Pupils: Pupils are equal, round, and reactive to light.   Cardiovascular:      Rate and Rhythm: Normal rate and regular rhythm.      Pulses: Normal pulses.      Heart sounds: Normal heart sounds. No murmur heard.     No friction rub. No gallop.   Pulmonary:      Effort: Pulmonary effort is normal. No respiratory distress.      Breath sounds: Normal breath sounds. No stridor. No wheezing, rhonchi or rales.   Abdominal:      General: Bowel sounds are normal. There is no distension.      Palpations: Abdomen is soft. There is no mass.      Tenderness: There is no abdominal tenderness. There is no guarding or rebound.      Hernia: No hernia is present.   Genitourinary:     Comments: Suprapubic cath  Musculoskeletal:         General: Tenderness (Rt. shoulder - in sling) and signs of injury present.      Right lower leg: No edema.      Left lower leg: No edema.   Skin:     Capillary Refill: Capillary refill takes less than 2 seconds.   Neurological:      Mental Status: He is alert.      Sensory: Sensory deficit (B/L LE) present.           Current Medications      sodium zirconium cyclosilicate  10 g Oral TID    gabapentin  100 mg Oral TID    heparin (porcine)  5,000 Units SubCUTAneous 3 times per day    amLODIPine  10 mg Oral Daily    hydrALAZINE  100 mg Oral 3 times per day    insulin lispro  0-4 Units SubCUTAneous TID     insulin lispro  0-4 Units SubCUTAneous Nightly    levothyroxine  75 mcg Oral Daily    [Held by provider] torsemide  20 mg Oral Daily    sodium chloride flush  5-40 mL IntraVENous 2 times per day    miconazole   Topical BID  SHOULDER RIGHT (MIN 2 VIEWS) COMPARISON: None RESULT: Mild displaced comminuted fracture right humeral neck. There is moderate adjacent soft tissue swelling acromioclavicular joint is within normal limits. Acromiohumeral interval is maintained. Partially imaged cervical fusion.     Mild displaced comminuted fracture right humeral neck. Electronically signed by Rick Whitmore    CT HEAD WO CONTRAST    Result Date: 8/1/2024  Head CT, cervical spine CT INDICATION: Trauma TECHNIQUE: Multiple 2D axial images obtained through the brain without intravenous contrast.  Radiation dose reduction techniques were used for this study:  All CT scans performed at this facility use one or all of the following: Automated exposure control, adjustment of the mA and/or kVp according to patient's size, iterative reconstruction. COMPARISON: None FINDINGS: No areas of abnormal attenuation are seen in the brain. There is no CT evidence of acute hemorrhage or infarction. The ventricles are normal in size. There are no extra-axial fluid collections. No masses are seen. The sinuses are clear. There are no bony lesions. Cervical spine is intact. Prominent multilevel degenerative changes with associated bilateral facet and uncovertebral joint arthropathy. Extensive posterior spinal fusion hardware in the cervical spine is intact without evidence of loosening or fracture. Near complete opacification of the right maxillary sinus.     No CT evidence of acute intracranial or cervical spine abnormality. Electronically signed by Jimmy Thornton    CT CERVICAL SPINE WO CONTRAST    Result Date: 8/1/2024  Head CT, cervical spine CT INDICATION: Trauma TECHNIQUE: Multiple 2D axial images obtained through the brain without intravenous contrast.  Radiation dose reduction techniques were used for this study:  All CT scans performed at this facility use one or all of the following: Automated exposure control, adjustment of the mA and/or kVp according to

## 2024-08-07 NOTE — PLAN OF CARE
Problem: Discharge Planning  Goal: Discharge to home or other facility with appropriate resources  Outcome: Progressing     Problem: Safety - Adult  Goal: Free from fall injury  Outcome: Progressing     Problem: ABCDS Injury Assessment  Goal: Absence of physical injury  Outcome: Progressing     Problem: Pain  Goal: Verbalizes/displays adequate comfort level or baseline comfort level  Outcome: Progressing     Problem: Nutrition Deficit:  Goal: Optimize nutritional status  8/6/2024 2320 by Meri Lo LPN  Outcome: Progressing  8/6/2024 1009 by Noreen Lloyd RD  Outcome: Progressing  Flowsheets (Taken 8/6/2024 1002)  Nutrient intake appropriate for improving, restoring, or maintaining nutritional needs:   Assess nutritional status and recommend course of action   Recommend appropriate diets, oral nutritional supplements, and vitamin/mineral supplements   Monitor oral intake, labs, and treatment plans     Problem: Chronic Conditions and Co-morbidities  Goal: Patient's chronic conditions and co-morbidity symptoms are monitored and maintained or improved  Outcome: Progressing

## 2024-08-07 NOTE — PROGRESS NOTES
Patient with order to receive one unit blood transfusion for Hgb 6.6. Type and screen completed on 8/5, Patient verbally agrees to transfusion today and at admission during noted admission assessment. Patient unable to sign consent form at this time related to pre admission R arm injury. Verbalizes understanding of need for blood and the means of blood transfusion. Patient made an non-sensible zo on consent line for transfusion, marking witnessed by both signing Nurses of this note.

## 2024-08-07 NOTE — CONSULTS
Consult completed. Nexiva 20g 1.75\" peripheral IV initiated into left forearm x 1 attempt using ultrasound guided technique. Brisk blood return, flushes without resistance. Patient tolerated well. Primary nurse notified.     Consult the Vascular Access Team for questions, concerns, or change in patient's condition.

## 2024-08-07 NOTE — PROGRESS NOTES
Occupational Therapy  Attempted to see pt on this date for OT/PT and noted increased confusion and agitation and low HGB.  Will attempt as able and appropriate.    Lanny BALBUENA/L

## 2024-08-07 NOTE — PROGRESS NOTES
Nephrology Progress Note  8/7/2024 8:02 AM        Subjective:   Admit Date: 7/31/2024  PCP: Jacobo Zazueta MD    Interval History: Receiving packed red blood cell transfusion    Diet: Some    ROS: No shortness of breath, no fever and acceptable blood pressure     Data:     Current meds:    sodium zirconium cyclosilicate  10 g Oral TID    gabapentin  100 mg Oral TID    heparin (porcine)  5,000 Units SubCUTAneous 3 times per day    amLODIPine  10 mg Oral Daily    hydrALAZINE  100 mg Oral 3 times per day    insulin lispro  0-4 Units SubCUTAneous TID WC    insulin lispro  0-4 Units SubCUTAneous Nightly    levothyroxine  75 mcg Oral Daily    [Held by provider] torsemide  20 mg Oral Daily    sodium chloride flush  5-40 mL IntraVENous 2 times per day    miconazole   Topical BID      sodium chloride      dextrose      sodium chloride 25 mL/hr at 08/05/24 0340         I/O last 3 completed shifts:  In: 780 [P.O.:780]  Out: 1275 [Urine:1275]    CBC:   Recent Labs     08/05/24  0331 08/05/24  0839 08/06/24  1135 08/07/24  0213   WBC 7.0  --  7.3 7.3   HGB 6.9* 8.0* 7.9* 6.6*     --  229 214          Recent Labs     08/05/24  0331 08/06/24  0131 08/07/24  0213    138 138   K 4.9 5.6* 6.0*    99 101   CO2 24 23 22   BUN 65* 70* 71*   CREATININE 2.9* 3.2* 3.0*   GLUCOSE 252* 225* 179*       Lab Results   Component Value Date    CALCIUM 8.8 08/07/2024    PHOS 4.5 08/05/2024       Objective:     Vitals: BP (!) 135/53   Pulse 78   Temp 98.5 °F (36.9 °C)   Resp 18   Ht 1.727 m (5' 8\")   Wt 75.8 kg (167 lb)   SpO2 93%   BMI 25.39 kg/m² ,    General appearance: He was alert and oriented he was sleeping but did wake up  HEENT: Positive conjunctival pallor no scleral icterus  Neck: Supple  Lungs: No gross crackles anteriorly  Heart: Regular rate and rhythm  Abdomen: Soft-he has suprapubic catheter  Extremities: Trace leg edema      Problem List :         Impression :     Acute kidney disease with underlying  chronic kidney stage G4 A3-stable  High potassium probably from bleeding-I suspect he bled from long bone fracture-causing anemia  History of high blood pressure, ureteral stent suprapubic catheter fall hypertension and multiple other chronic conditions    Recommendation/Plan  :     Continue potassium binders probably 3 times a day for now, once history of bleeding, hopefully potassium will go down, otherwise good blood sugar control, watch for iatrogenic and nosocomial complication follow clinically and biochemically      Kade Suarez MD MD

## 2024-08-07 NOTE — CONSULTS
Eosinophils % 8.5 (H) 0 - 3 %    Basophils % 0.4 0 - 1 %    Neutrophils Absolute 4.2 K/CU MM    Lymphocytes Absolute 1.8 K/CU MM    Monocytes Absolute 0.7 K/CU MM    Eosinophils Absolute 0.6 K/CU MM    Basophils Absolute 0.0 K/CU MM    Nucleated RBC % 0.0 %    Total Nucleated RBC 0.0 K/CU MM    Total Immature Neutrophil 0.04 K/CU MM    Immature Neutrophil % 0.5 (H) 0 - 0.43 %   POCT Glucose    Collection Time: 08/06/24 11:42 AM   Result Value Ref Range    POC Glucose 184 (H) 70 - 99 MG/DL   POCT Glucose    Collection Time: 08/06/24  4:37 PM   Result Value Ref Range    POC Glucose 356 (H) 70 - 99 MG/DL   POCT Glucose    Collection Time: 08/06/24  7:00 PM   Result Value Ref Range    POC Glucose 325 (H) 70 - 99 MG/DL   POCT Glucose    Collection Time: 08/06/24  8:22 PM   Result Value Ref Range    POC Glucose 261 (H) 70 - 99 MG/DL   CBC with Auto Differential    Collection Time: 08/07/24  2:13 AM   Result Value Ref Range    WBC 7.3 4.0 - 10.5 K/CU MM    RBC 2.19 (L) 4.6 - 6.2 M/CU MM    Hemoglobin 6.6 (LL) 13.5 - 18.0 GM/DL    Hematocrit 22.2 (L) 42 - 52 %    .4 (H) 78 - 100 FL    MCH 30.1 27 - 31 PG    MCHC 29.7 (L) 32.0 - 36.0 %    RDW 15.6 (H) 11.7 - 14.9 %    Platelets 214 140 - 440 K/CU MM    MPV 11.3 (H) 7.5 - 11.1 FL    Differential Type AUTOMATED DIFFERENTIAL     Neutrophils % 57.5 36 - 66 %    Lymphocytes % 25.3 24 - 44 %    Monocytes % 8.9 (H) 0 - 4 %    Eosinophils % 7.5 (H) 0 - 3 %    Basophils % 0.5 0 - 1 %    Neutrophils Absolute 4.2 K/CU MM    Lymphocytes Absolute 1.9 K/CU MM    Monocytes Absolute 0.7 K/CU MM    Eosinophils Absolute 0.6 K/CU MM    Basophils Absolute 0.0 K/CU MM    Nucleated RBC % 0.0 %    Total Nucleated RBC 0.0 K/CU MM    Total Immature Neutrophil 0.02 K/CU MM    Immature Neutrophil % 0.3 0 - 0.43 %   Basic Metabolic Panel    Collection Time: 08/07/24  2:13 AM   Result Value Ref Range    Sodium 138 135 - 145 MMOL/L    Potassium 6.0 (HH) 3.5 - 5.1 MMOL/L    Chloride 101 99 - 110

## 2024-08-07 NOTE — PLAN OF CARE
Problem: Discharge Planning  Goal: Discharge to home or other facility with appropriate resources  8/7/2024 0947 by Leesa Funes RN  Outcome: Progressing  Flowsheets (Taken 8/7/2024 0941)  Discharge to home or other facility with appropriate resources: Identify barriers to discharge with patient and caregiver  8/6/2024 2320 by Meri Lo LPN  Outcome: Progressing     Problem: Safety - Adult  Goal: Free from fall injury  8/7/2024 0947 by Leesa Funes RN  Outcome: Progressing  8/6/2024 2320 by Meri Lo LPN  Outcome: Progressing     Problem: ABCDS Injury Assessment  Goal: Absence of physical injury  8/7/2024 0947 by Leesa Funes RN  Outcome: Progressing  8/6/2024 2320 by Meri Lo LPN  Outcome: Progressing     Problem: Pain  Goal: Verbalizes/displays adequate comfort level or baseline comfort level  8/7/2024 0947 by Leesa Funes RN  Outcome: Progressing  8/6/2024 2320 by Meri Lo LPN  Outcome: Progressing     Problem: Nutrition Deficit:  Goal: Optimize nutritional status  8/7/2024 0947 by Leesa Funes RN  Outcome: Progressing  8/6/2024 2320 by Meri Lo LPN  Outcome: Progressing     Problem: Chronic Conditions and Co-morbidities  Goal: Patient's chronic conditions and co-morbidity symptoms are monitored and maintained or improved  8/7/2024 0947 by Leesa Funes RN  Outcome: Progressing  Flowsheets (Taken 8/7/2024 0941)  Care Plan - Patient's Chronic Conditions and Co-Morbidity Symptoms are Monitored and Maintained or Improved: Monitor and assess patient's chronic conditions and comorbid symptoms for stability, deterioration, or improvement  8/6/2024 2320 by Meri Lo LPN  Outcome: Progressing

## 2024-08-08 ENCOUNTER — ANESTHESIA EVENT (OUTPATIENT)
Dept: ENDOSCOPY | Age: 84
End: 2024-08-08
Payer: MEDICARE

## 2024-08-08 ENCOUNTER — ANESTHESIA (OUTPATIENT)
Dept: ENDOSCOPY | Age: 84
End: 2024-08-08
Payer: MEDICARE

## 2024-08-08 PROBLEM — K22.2 SCHATZKI'S RING OF DISTAL ESOPHAGUS: Status: ACTIVE | Noted: 2024-08-08

## 2024-08-08 PROBLEM — R71.0 DROP IN HEMOGLOBIN: Status: ACTIVE | Noted: 2024-08-08

## 2024-08-08 LAB
ABO/RH: NORMAL
ANION GAP SERPL CALCULATED.3IONS-SCNC: 12 MMOL/L (ref 7–16)
ANTIBODY SCREEN: NEGATIVE
BASOPHILS ABSOLUTE: 0 K/CU MM
BASOPHILS RELATIVE PERCENT: 0.4 % (ref 0–1)
BUN SERPL-MCNC: 64 MG/DL (ref 6–23)
CALCIUM SERPL-MCNC: 8.8 MG/DL (ref 8.3–10.6)
CHLORIDE BLD-SCNC: 105 MMOL/L (ref 99–110)
CO2: 26 MMOL/L (ref 21–32)
COMPONENT: NORMAL
CREAT SERPL-MCNC: 2.6 MG/DL (ref 0.9–1.3)
CROSSMATCH RESULT: NORMAL
DIFFERENTIAL TYPE: ABNORMAL
EOSINOPHILS ABSOLUTE: 0.5 K/CU MM
EOSINOPHILS RELATIVE PERCENT: 7.1 % (ref 0–3)
GFR, ESTIMATED: 23 ML/MIN/1.73M2
GLUCOSE BLD-MCNC: 171 MG/DL (ref 70–99)
GLUCOSE BLD-MCNC: 190 MG/DL (ref 70–99)
GLUCOSE BLD-MCNC: 211 MG/DL (ref 70–99)
GLUCOSE BLD-MCNC: 224 MG/DL (ref 70–99)
GLUCOSE SERPL-MCNC: 195 MG/DL (ref 70–99)
HCT VFR BLD CALC: 26.7 % (ref 42–52)
HEMOGLOBIN: 8.3 GM/DL (ref 13.5–18)
IMMATURE NEUTROPHIL %: 0.4 % (ref 0–0.43)
LYMPHOCYTES ABSOLUTE: 1.7 K/CU MM
LYMPHOCYTES RELATIVE PERCENT: 23.2 % (ref 24–44)
MAGNESIUM: 2.5 MG/DL (ref 1.8–2.4)
MCH RBC QN AUTO: 29.9 PG (ref 27–31)
MCHC RBC AUTO-ENTMCNC: 31.1 % (ref 32–36)
MCV RBC AUTO: 96 FL (ref 78–100)
MONOCYTES ABSOLUTE: 0.7 K/CU MM
MONOCYTES RELATIVE PERCENT: 9 % (ref 0–4)
NEUTROPHILS ABSOLUTE: 4.3 K/CU MM
NEUTROPHILS RELATIVE PERCENT: 59.9 % (ref 36–66)
NUCLEATED RBC %: 0 %
PDW BLD-RTO: 17.2 % (ref 11.7–14.9)
PHOSPHORUS: 4.1 MG/DL (ref 2.5–4.9)
PLATELET # BLD: 249 K/CU MM (ref 140–440)
PMV BLD AUTO: 10.6 FL (ref 7.5–11.1)
POTASSIUM SERPL-SCNC: 4.6 MMOL/L (ref 3.5–5.1)
RBC # BLD: 2.78 M/CU MM (ref 4.6–6.2)
SODIUM BLD-SCNC: 143 MMOL/L (ref 135–145)
STATUS: NORMAL
TOTAL IMMATURE NEUTOROPHIL: 0.03 K/CU MM
TOTAL NUCLEATED RBC: 0 K/CU MM
TRANSFUSION STATUS: NORMAL
UNIT DIVISION: 0
UNIT NUMBER: NORMAL
WBC # BLD: 7.2 K/CU MM (ref 4–10.5)

## 2024-08-08 PROCEDURE — 84100 ASSAY OF PHOSPHORUS: CPT

## 2024-08-08 PROCEDURE — 0D738DZ DILATION OF LOWER ESOPHAGUS WITH INTRALUMINAL DEVICE, VIA NATURAL OR ARTIFICIAL OPENING ENDOSCOPIC: ICD-10-PCS | Performed by: INTERNAL MEDICINE

## 2024-08-08 PROCEDURE — 1200000000 HC SEMI PRIVATE

## 2024-08-08 PROCEDURE — 94761 N-INVAS EAR/PLS OXIMETRY MLT: CPT

## 2024-08-08 PROCEDURE — 0DB68ZX EXCISION OF STOMACH, VIA NATURAL OR ARTIFICIAL OPENING ENDOSCOPIC, DIAGNOSTIC: ICD-10-PCS | Performed by: INTERNAL MEDICINE

## 2024-08-08 PROCEDURE — 3700000001 HC ADD 15 MINUTES (ANESTHESIA): Performed by: INTERNAL MEDICINE

## 2024-08-08 PROCEDURE — 6370000000 HC RX 637 (ALT 250 FOR IP): Performed by: INTERNAL MEDICINE

## 2024-08-08 PROCEDURE — 88305 TISSUE EXAM BY PATHOLOGIST: CPT | Performed by: PATHOLOGY

## 2024-08-08 PROCEDURE — 43239 EGD BIOPSY SINGLE/MULTIPLE: CPT | Performed by: INTERNAL MEDICINE

## 2024-08-08 PROCEDURE — 88342 IMHCHEM/IMCYTCHM 1ST ANTB: CPT | Performed by: PATHOLOGY

## 2024-08-08 PROCEDURE — 3609017700 HC EGD DILATION GASTRIC/DUODENAL STRICTURE: Performed by: INTERNAL MEDICINE

## 2024-08-08 PROCEDURE — 80048 BASIC METABOLIC PNL TOTAL CA: CPT

## 2024-08-08 PROCEDURE — 6360000002 HC RX W HCPCS: Performed by: NURSE ANESTHETIST, CERTIFIED REGISTERED

## 2024-08-08 PROCEDURE — 82962 GLUCOSE BLOOD TEST: CPT

## 2024-08-08 PROCEDURE — 85025 COMPLETE CBC W/AUTO DIFF WBC: CPT

## 2024-08-08 PROCEDURE — 3700000000 HC ANESTHESIA ATTENDED CARE: Performed by: INTERNAL MEDICINE

## 2024-08-08 PROCEDURE — 36415 COLL VENOUS BLD VENIPUNCTURE: CPT

## 2024-08-08 PROCEDURE — C1726 CATH, BAL DIL, NON-VASCULAR: HCPCS | Performed by: INTERNAL MEDICINE

## 2024-08-08 PROCEDURE — 6360000002 HC RX W HCPCS: Performed by: INTERNAL MEDICINE

## 2024-08-08 PROCEDURE — 43249 ESOPH EGD DILATION <30 MM: CPT | Performed by: INTERNAL MEDICINE

## 2024-08-08 PROCEDURE — 83735 ASSAY OF MAGNESIUM: CPT

## 2024-08-08 PROCEDURE — 2580000003 HC RX 258: Performed by: NURSE ANESTHETIST, CERTIFIED REGISTERED

## 2024-08-08 PROCEDURE — 6370000000 HC RX 637 (ALT 250 FOR IP): Performed by: STUDENT IN AN ORGANIZED HEALTH CARE EDUCATION/TRAINING PROGRAM

## 2024-08-08 PROCEDURE — 2709999900 HC NON-CHARGEABLE SUPPLY: Performed by: INTERNAL MEDICINE

## 2024-08-08 PROCEDURE — 2580000003 HC RX 258: Performed by: STUDENT IN AN ORGANIZED HEALTH CARE EDUCATION/TRAINING PROGRAM

## 2024-08-08 RX ORDER — GABAPENTIN 100 MG/1
100 CAPSULE ORAL 2 TIMES DAILY
Status: DISCONTINUED | OUTPATIENT
Start: 2024-08-08 | End: 2024-08-09 | Stop reason: HOSPADM

## 2024-08-08 RX ORDER — LIDOCAINE HYDROCHLORIDE 20 MG/ML
INJECTION, SOLUTION INTRAVENOUS PRN
Status: DISCONTINUED | OUTPATIENT
Start: 2024-08-08 | End: 2024-08-08 | Stop reason: SDUPTHER

## 2024-08-08 RX ORDER — SODIUM CHLORIDE 9 MG/ML
INJECTION, SOLUTION INTRAVENOUS CONTINUOUS PRN
Status: DISCONTINUED | OUTPATIENT
Start: 2024-08-08 | End: 2024-08-08 | Stop reason: SDUPTHER

## 2024-08-08 RX ORDER — PROPOFOL 10 MG/ML
INJECTION, EMULSION INTRAVENOUS PRN
Status: DISCONTINUED | OUTPATIENT
Start: 2024-08-08 | End: 2024-08-08 | Stop reason: SDUPTHER

## 2024-08-08 RX ADMIN — GABAPENTIN 100 MG: 100 CAPSULE ORAL at 20:56

## 2024-08-08 RX ADMIN — PROPOFOL 30 MG: 10 INJECTION, EMULSION INTRAVENOUS at 14:54

## 2024-08-08 RX ADMIN — AMLODIPINE BESYLATE 10 MG: 10 TABLET ORAL at 09:29

## 2024-08-08 RX ADMIN — HEPARIN SODIUM 5000 UNITS: 5000 INJECTION INTRAVENOUS; SUBCUTANEOUS at 17:05

## 2024-08-08 RX ADMIN — HYDRALAZINE HYDROCHLORIDE 100 MG: 50 TABLET ORAL at 17:05

## 2024-08-08 RX ADMIN — HYDRALAZINE HYDROCHLORIDE 100 MG: 50 TABLET ORAL at 20:55

## 2024-08-08 RX ADMIN — HEPARIN SODIUM 5000 UNITS: 5000 INJECTION INTRAVENOUS; SUBCUTANEOUS at 20:56

## 2024-08-08 RX ADMIN — MICONAZOLE NITRATE: 2 POWDER TOPICAL at 09:31

## 2024-08-08 RX ADMIN — SODIUM CHLORIDE, PRESERVATIVE FREE 10 ML: 5 INJECTION INTRAVENOUS at 09:31

## 2024-08-08 RX ADMIN — HYDROCODONE BITARTRATE AND ACETAMINOPHEN 2 TABLET: 5; 325 TABLET ORAL at 20:55

## 2024-08-08 RX ADMIN — LEVOTHYROXINE SODIUM 75 MCG: 0.07 TABLET ORAL at 06:11

## 2024-08-08 RX ADMIN — GABAPENTIN 100 MG: 100 CAPSULE ORAL at 09:29

## 2024-08-08 RX ADMIN — HYDRALAZINE HYDROCHLORIDE 100 MG: 50 TABLET ORAL at 06:11

## 2024-08-08 RX ADMIN — POLYETHYLENE GLYCOL 3350, SODIUM SULFATE ANHYDROUS, SODIUM BICARBONATE, SODIUM CHLORIDE, POTASSIUM CHLORIDE 4000 ML: 236; 22.74; 6.74; 5.86; 2.97 POWDER, FOR SOLUTION ORAL at 17:06

## 2024-08-08 RX ADMIN — MICONAZOLE NITRATE: 2 POWDER TOPICAL at 20:57

## 2024-08-08 RX ADMIN — LIDOCAINE HYDROCHLORIDE 40 MG: 20 INJECTION, SOLUTION INTRAVENOUS at 14:54

## 2024-08-08 RX ADMIN — SODIUM CHLORIDE: 9 INJECTION, SOLUTION INTRAVENOUS at 14:50

## 2024-08-08 RX ADMIN — HEPARIN SODIUM 5000 UNITS: 5000 INJECTION INTRAVENOUS; SUBCUTANEOUS at 06:11

## 2024-08-08 RX ADMIN — SODIUM CHLORIDE, PRESERVATIVE FREE 10 ML: 5 INJECTION INTRAVENOUS at 20:54

## 2024-08-08 ASSESSMENT — PAIN DESCRIPTION - ORIENTATION
ORIENTATION: RIGHT
ORIENTATION: RIGHT;LEFT

## 2024-08-08 ASSESSMENT — PAIN - FUNCTIONAL ASSESSMENT
PAIN_FUNCTIONAL_ASSESSMENT: PREVENTS OR INTERFERES SOME ACTIVE ACTIVITIES AND ADLS
PAIN_FUNCTIONAL_ASSESSMENT: ACTIVITIES ARE NOT PREVENTED

## 2024-08-08 ASSESSMENT — PAIN DESCRIPTION - PAIN TYPE
TYPE: ACUTE PAIN
TYPE: ACUTE PAIN

## 2024-08-08 ASSESSMENT — PAIN DESCRIPTION - LOCATION
LOCATION: ARM;LEG
LOCATION: ARM;SHOULDER

## 2024-08-08 ASSESSMENT — PAIN SCALES - GENERAL
PAINLEVEL_OUTOF10: 0
PAINLEVEL_OUTOF10: 8
PAINLEVEL_OUTOF10: 0
PAINLEVEL_OUTOF10: 6
PAINLEVEL_OUTOF10: 0
PAINLEVEL_OUTOF10: 0
PAINLEVEL_OUTOF10: 6
PAINLEVEL_OUTOF10: 0

## 2024-08-08 ASSESSMENT — PAIN DESCRIPTION - FREQUENCY
FREQUENCY: CONTINUOUS
FREQUENCY: CONTINUOUS

## 2024-08-08 ASSESSMENT — PAIN DESCRIPTION - DESCRIPTORS
DESCRIPTORS: ACHING;SORE
DESCRIPTORS: ACHING

## 2024-08-08 ASSESSMENT — PAIN DESCRIPTION - ONSET
ONSET: ON-GOING
ONSET: ON-GOING

## 2024-08-08 ASSESSMENT — PAIN SCALES - WONG BAKER: WONGBAKER_NUMERICALRESPONSE: HURTS LITTLE MORE

## 2024-08-08 NOTE — BRIEF OP NOTE
Brief Postoperative Note      Patient: Greg Easton  YOB: 1940  MRN: [id number]    Date of Procedure: 8/8/2024    Pre-Op Diagnosis Codes:     * Drop in hemoglobin [R71.0]    Post-Op Diagnosis:  see below       Procedure(s):  ESOPHAGOGASTRODUODENOSCOPY DILATION BALLOON 15MM-18MM UP TO 18MM AND BIOPSY    Surgeon(s):  Sallie Way MD    Assistant:  * No surgical staff found *    Anesthesia: Monitor Anesthesia Care    Estimated Blood Loss (mL): Minimal    Complications: None    Specimens:   ID Type Source Tests Collected by Time Destination   A : Gastric Bx for H. Pylori and gastritis Tissue Stomach SURGICAL PATHOLOGY Sallie Way MD 8/8/2024 1458        Implants:  * No implants in log *      Drains:   Suprapubic Catheter (Active)   Site Assessment No urethral drainage 08/08/24 0617   Urine Color Yellow 08/08/24 0617   Urine Appearance Clear 08/08/24 0617   Urine Odor Other (Comment) 08/07/24 2051   Collection Container Standard 08/08/24 0617   Securement Method Leg strap 08/07/24 2051   Catheter Care  Other (comment) 08/08/24 0617   Catheter Best Practices  Drainage tube clipped to bed;Bag below bladder;Bag not on floor;Lack of dependent loop in tubing;Drainage bag less than half full 08/08/24 0617   Status Draining 08/08/24 0617   Output (mL) 500 mL 08/08/24 0617       Impression:         -  Low-grade of narrowing Schatzki ring.  Dilated.          -  4 cm hiatal hernia.          -  Gastritis, characterized by erythema.  Biopsied.          -  Normal duodenal bulb and second portion of the duodenum.   Recommendation:         -  Patient has a contact number available for emergencies.  The signs and             symptoms of potential delayed complications were discussed with the patient.             Return to normal activities tomorrow.  Written discharge instructions were             provided to the patient.          -  Await pathology results.          -  Telephone GI clinic for pathology results in 1

## 2024-08-08 NOTE — PROGRESS NOTES
In-Patient Progress Note    Patient:  Greg Easton 83 y.o. male MRN: [id number]     Date of Service: 8/8/2024    Hospital Day: 9      Chief complaint: had concerns including Fall and Shoulder Injury.      Subjective   Patient seen and examined in the morning. Patient states he has some pain over Rt. Arm but currently stable on pain meds. No burning pain over leg today. States he feels tired.       See ROS    I have reviewed all pertinent PMHx, PSHx, FamHx, SocialHx, medications, and allergies and updated history as appropriate. I have reviewed images as appropriate.     Assessment and Plan   Greg Easton, a 83 y.o. male, with a history of Prostate cancer, urinary incontinence, chronic indwelling suprapubic cath, splenic granuloma, CKD IV, ambulatory dysfunction, IDDM, GERD, HFpEF, Hypothyroidism  was admitted on 7/31/2024 with complaints of had concerns including Fall and Shoulder Injury.    Assessment and Plan:  Pre-syncope vs Syncope 2/2 Likely UTI   Rt. Humerus Fracture. Non-operative per ortho. Continue PT/OT. Asked ortho to re-evaluate  ESBL E Coli and Pan-sensitive Proteus UTI with chronic indwelling suprapubic cath. Cath changed 2 days prior to presentation by urology. ID on board. Continue meropenem - plan for total of 10 days (end date 8/11/2024)  E Coli Bacteremia.   PAMELLA on CKD. Baseline Cr 2.1-2.3. Peaked at 3.3, now 2.6. Nephro on board.   Hyperkalemia. K 4.6.  Splenic granuloma. Hem/onc on board.   Ambulatory dysfunction. Normally ambulates with walker  Acute on Chronic Normocytic Anemia 2/2 ?GI bleed, h/o anemia of chronic disease, CKD, possibly long bone bleed. Baseline Hb 8-8.5. Hb 6.6 in the AM. 1U PRBC transfused this admission. Hb 8.3. Hem/onc on board. GI on board. Plan for EGD today. C Scope possibly tomorrow.   IDDM with Peripheral neuropathy. Decrease gabapentin 100mg to BID.   GERD   Urinary Incontinenece   H/O Prostate cancer   Chronic HFpEF  Hypothyroidism       # Peptic ulcer  aneurysm. LIVER: Normal. Stomach: Small hiatal hernia. GALLBLADDER AND BILE DUCTS: Gallbladder is present. No biliary ductal dilatation. PANCREAS: Normal. SPLEEN: Punctate calcification in the spleen. ADRENAL GLANDS: Normal. KIDNEYS: Atrophic left kidney with double-J stent in place. Inflammatory changes are noted about the proximal stent ureter. Mild inflammatory changes about the right kidney with no evidence of hydronephrosis. BOWEL: No dilatation. PERITONEUM/RETROPERITONEUM: No ascites. No free air. LYMPH NODES: No adenopathy. ABDOMINAL WALL: Normal. URINARY BLADDER: Suprapubic cath in the bladder. REPRODUCTIVE ORGANS: No associated masses. BONES: No acute abnormalities.     1. Inflammatory changes about the proximal left ureteral stent. No evidence of abscess. 2. Small hiatal hernia. 3. 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 Electronically signed by Valente Judge    XR CHEST PORTABLE    Result Date: 8/1/2024  EXAMINATION:  CHEST RADIOGRAPH (PORTABLE SINGLE VIEW AP) Exam Date/Time:  7/31/2024 11:47 PM Clinical History:  syncopal fall, concern for trauamtic injury Comparison: 5/29/2024  RESULT: Lines, tubes, and devices:  None. Lungs and pleura:  No focal consolidation. No pneumothorax. No pleural effusion. Cardiomediastinal silhouette:  Stable cardiomediastinal silhouette. Atherosclerotic calcification of the aortic arch.  Other: No acute osseous process.      No acute cardiopulmonary abnormality. Electronically signed by Rick Whitmore    XR SHOULDER RIGHT (MIN 2 VIEWS)    Result Date: 8/1/2024  EXAMINATION:  XR SHOULDER RIGHT (MIN 2 VIEWS) HISTORY: Fall TECHNIQUE:  XR SHOULDER RIGHT (MIN 2 VIEWS) COMPARISON: None RESULT: Mild displaced comminuted fracture right humeral neck. There is moderate adjacent soft tissue swelling acromioclavicular joint is within normal limits. Acromiohumeral interval is maintained. Partially imaged cervical fusion.     Mild displaced comminuted fracture right humeral neck.

## 2024-08-08 NOTE — PROGRESS NOTES
ATTEMPT  Attempt at 1413, pt is off floor for EGD at this time and PTA to f/u as schedule allows.   Electronically signed by:    Teresita Pop, PTA  8/8/2024, 2:13 PM

## 2024-08-08 NOTE — ANESTHESIA PRE PROCEDURE
Diabetes, malignancy/cancer.                 Abdominal:             Vascular:          Other Findings:       Anesthesia Plan      MAC     ASA 3             Anesthetic plan and risks discussed with patient and healthcare power of .    Use of blood products discussed with healthcare power of  whom.      Attending anesthesiologist reviewed and agrees with Preprocedure content            VIVIANA Mtz - KIMBER   8/8/2024

## 2024-08-08 NOTE — PROGRESS NOTES
Nephrology Progress Note  8/8/2024 7:55 AM        Subjective:   Admit Date: 7/31/2024  PCP: Jacobo Zazueta MD    Interval History: Still lethargic this morning    Diet: Probably not up to the par    ROS: Lethargic, urine output recorded little more than 1 L, no fever and acceptable blood pressure    Data:     Current meds:    gabapentin  100 mg Oral TID    heparin (porcine)  5,000 Units SubCUTAneous 3 times per day    amLODIPine  10 mg Oral Daily    hydrALAZINE  100 mg Oral 3 times per day    insulin lispro  0-4 Units SubCUTAneous TID WC    insulin lispro  0-4 Units SubCUTAneous Nightly    levothyroxine  75 mcg Oral Daily    [Held by provider] torsemide  20 mg Oral Daily    sodium chloride flush  5-40 mL IntraVENous 2 times per day    miconazole   Topical BID      sodium chloride      dextrose      sodium chloride 25 mL/hr at 08/05/24 0340         I/O last 3 completed shifts:  In: 603.9 [P.O.:236; Blood:367.9]  Out: 1525 [Urine:1525]    CBC:   Recent Labs     08/06/24  1135 08/07/24  0213 08/07/24  1035 08/08/24  0356   WBC 7.3 7.3  --  7.2   HGB 7.9* 6.6* 8.9* 8.3*    214  --  249          Recent Labs     08/06/24  0131 08/07/24  0213 08/08/24  0356    138 143   K 5.6* 6.0* 4.6   CL 99 101 105   CO2 23 22 26   BUN 70* 71* 64*   CREATININE 3.2* 3.0* 2.6*   GLUCOSE 225* 179* 195*       Lab Results   Component Value Date    CALCIUM 8.8 08/08/2024    PHOS 4.1 08/08/2024       Objective:     Vitals: /61   Pulse 79   Temp 98.8 °F (37.1 °C) (Oral)   Resp 16   Ht 1.727 m (5' 8\")   Wt 75.8 kg (167 lb)   SpO2 93%   BMI 25.39 kg/m² ,    General appearance: Lethargic this morning did wake up and briefly talk to me but went back to sleep  HEENT: Positive conjunctival pallor no scleral icterus  Neck: Supple  Lungs: No crackles although limited anterior exam  Heart: Seemed regular rate rhythm  Abdomen: Soft, has suprapubic catheter  Extremities: No gross leg edema      Problem List :

## 2024-08-08 NOTE — CARE COORDINATION
Chart reviewed and patient discussed in IDR. EGD needed and GI needs to clear. Jovanny is able to take patient once medically ready to discharge.

## 2024-08-08 NOTE — ANESTHESIA POSTPROCEDURE EVALUATION
Department of Anesthesiology  Postprocedure Note    Patient: Greg Easton  MRN: [id number]  YOB: 1940  Date of evaluation: 8/8/2024    Procedure Summary       Date: 08/08/24 Room / Location: [place] [address] / [facility]    Anesthesia Start: 1447 Anesthesia Stop: 1507    Procedure: ESOPHAGOGASTRODUODENOSCOPY DILATION BALLOON 15MM-18MM UP TO 18MM AND BIOPSY Diagnosis:       Drop in hemoglobin      (Drop in hemoglobin [R71.0])    Surgeons: Sallie Way MD Responsible Provider: Gracie Moon MD    Anesthesia Type: MAC ASA Status: 3            Anesthesia Type: No value filed.    Jackeline Phase I: Jackeline Score: 10    Jackeline Phase II:      Anesthesia Post Evaluation    Patient location during evaluation: bedside  Patient participation: complete - patient participated  Level of consciousness: awake  Airway patency: patent  Nausea & Vomiting: no nausea  Cardiovascular status: blood pressure returned to baseline  Respiratory status: acceptable  Hydration status: euvolemic  Pain management: adequate    No notable events documented.

## 2024-08-09 VITALS
SYSTOLIC BLOOD PRESSURE: 127 MMHG | HEIGHT: 68 IN | HEART RATE: 73 BPM | TEMPERATURE: 98.2 F | WEIGHT: 166.01 LBS | BODY MASS INDEX: 25.16 KG/M2 | DIASTOLIC BLOOD PRESSURE: 66 MMHG | RESPIRATION RATE: 15 BRPM | OXYGEN SATURATION: 94 %

## 2024-08-09 LAB
ANION GAP SERPL CALCULATED.3IONS-SCNC: 11 MMOL/L (ref 7–16)
BASOPHILS ABSOLUTE: 0 K/CU MM
BASOPHILS RELATIVE PERCENT: 0.5 % (ref 0–1)
BUN SERPL-MCNC: 60 MG/DL (ref 6–23)
CALCIUM SERPL-MCNC: 8.5 MG/DL (ref 8.3–10.6)
CHLORIDE BLD-SCNC: 106 MMOL/L (ref 99–110)
CO2: 27 MMOL/L (ref 21–32)
CREAT SERPL-MCNC: 2.6 MG/DL (ref 0.9–1.3)
DIFFERENTIAL TYPE: ABNORMAL
EOSINOPHILS ABSOLUTE: 0.4 K/CU MM
EOSINOPHILS RELATIVE PERCENT: 6.4 % (ref 0–3)
GFR, ESTIMATED: 23 ML/MIN/1.73M2
GLUCOSE BLD-MCNC: 188 MG/DL (ref 70–99)
GLUCOSE BLD-MCNC: 255 MG/DL (ref 70–99)
GLUCOSE SERPL-MCNC: 212 MG/DL (ref 70–99)
HCT VFR BLD CALC: 27.1 % (ref 42–52)
HEMOGLOBIN: 8.1 GM/DL (ref 13.5–18)
IMMATURE NEUTROPHIL %: 0.5 % (ref 0–0.43)
LYMPHOCYTES ABSOLUTE: 1.3 K/CU MM
LYMPHOCYTES RELATIVE PERCENT: 21.6 % (ref 24–44)
MAGNESIUM: 2.5 MG/DL (ref 1.8–2.4)
MCH RBC QN AUTO: 29.2 PG (ref 27–31)
MCHC RBC AUTO-ENTMCNC: 29.9 % (ref 32–36)
MCV RBC AUTO: 97.8 FL (ref 78–100)
MONOCYTES ABSOLUTE: 0.6 K/CU MM
MONOCYTES RELATIVE PERCENT: 9.6 % (ref 0–4)
NEUTROPHILS ABSOLUTE: 3.6 K/CU MM
NEUTROPHILS RELATIVE PERCENT: 61.4 % (ref 36–66)
NUCLEATED RBC %: 0 %
PDW BLD-RTO: 16.6 % (ref 11.7–14.9)
PHOSPHORUS: 3.6 MG/DL (ref 2.5–4.9)
PLATELET # BLD: 246 K/CU MM (ref 140–440)
PMV BLD AUTO: 10.4 FL (ref 7.5–11.1)
POTASSIUM SERPL-SCNC: 4.5 MMOL/L (ref 3.5–5.1)
RBC # BLD: 2.77 M/CU MM (ref 4.6–6.2)
SODIUM BLD-SCNC: 144 MMOL/L (ref 135–145)
TOTAL IMMATURE NEUTOROPHIL: 0.03 K/CU MM
TOTAL NUCLEATED RBC: 0 K/CU MM
WBC # BLD: 5.8 K/CU MM (ref 4–10.5)

## 2024-08-09 PROCEDURE — 83735 ASSAY OF MAGNESIUM: CPT

## 2024-08-09 PROCEDURE — 36415 COLL VENOUS BLD VENIPUNCTURE: CPT

## 2024-08-09 PROCEDURE — 6370000000 HC RX 637 (ALT 250 FOR IP): Performed by: STUDENT IN AN ORGANIZED HEALTH CARE EDUCATION/TRAINING PROGRAM

## 2024-08-09 PROCEDURE — 99232 SBSQ HOSP IP/OBS MODERATE 35: CPT | Performed by: INTERNAL MEDICINE

## 2024-08-09 PROCEDURE — 97530 THERAPEUTIC ACTIVITIES: CPT

## 2024-08-09 PROCEDURE — APPNB45 APP NON BILLABLE 31-45 MINUTES: Performed by: LICENSED PRACTICAL NURSE

## 2024-08-09 PROCEDURE — 6360000002 HC RX W HCPCS: Performed by: INTERNAL MEDICINE

## 2024-08-09 PROCEDURE — 85025 COMPLETE CBC W/AUTO DIFF WBC: CPT

## 2024-08-09 PROCEDURE — 80048 BASIC METABOLIC PNL TOTAL CA: CPT

## 2024-08-09 PROCEDURE — 84100 ASSAY OF PHOSPHORUS: CPT

## 2024-08-09 PROCEDURE — 97535 SELF CARE MNGMENT TRAINING: CPT

## 2024-08-09 PROCEDURE — 82962 GLUCOSE BLOOD TEST: CPT

## 2024-08-09 RX ORDER — GABAPENTIN 100 MG/1
100 CAPSULE ORAL 2 TIMES DAILY
Qty: 60 CAPSULE | Refills: 0
Start: 2024-08-09 | End: 2024-09-08

## 2024-08-09 RX ORDER — SODIUM CHLORIDE 9 MG/ML
INJECTION, SOLUTION INTRAVENOUS PRN
Status: DISCONTINUED | OUTPATIENT
Start: 2024-08-09 | End: 2024-08-09 | Stop reason: HOSPADM

## 2024-08-09 RX ORDER — OMEPRAZOLE 40 MG/1
40 CAPSULE, DELAYED RELEASE ORAL DAILY
Qty: 30 CAPSULE | Refills: 0
Start: 2024-08-09 | End: 2024-09-08

## 2024-08-09 RX ORDER — SODIUM CHLORIDE 0.9 % (FLUSH) 0.9 %
5-40 SYRINGE (ML) INJECTION EVERY 12 HOURS SCHEDULED
Status: DISCONTINUED | OUTPATIENT
Start: 2024-08-09 | End: 2024-08-09 | Stop reason: HOSPADM

## 2024-08-09 RX ORDER — HYDROCODONE BITARTRATE AND ACETAMINOPHEN 5; 325 MG/1; MG/1
1 TABLET ORAL EVERY 6 HOURS PRN
Qty: 12 TABLET | Refills: 0 | Status: SHIPPED | OUTPATIENT
Start: 2024-08-09 | End: 2024-08-12

## 2024-08-09 RX ORDER — SODIUM CHLORIDE 0.9 % (FLUSH) 0.9 %
5-40 SYRINGE (ML) INJECTION PRN
Status: DISCONTINUED | OUTPATIENT
Start: 2024-08-09 | End: 2024-08-09 | Stop reason: HOSPADM

## 2024-08-09 RX ADMIN — LEVOTHYROXINE SODIUM 75 MCG: 0.07 TABLET ORAL at 04:53

## 2024-08-09 RX ADMIN — HEPARIN SODIUM 5000 UNITS: 5000 INJECTION INTRAVENOUS; SUBCUTANEOUS at 04:53

## 2024-08-09 NOTE — PROGRESS NOTES
ATTEMPT  Attempt x 2, first attempt pt is being fed and PCA requests PTA return later. Second attempt pt finishing up with OT and requires rest break In between session. Pt possibly on d/c, PTA to f/u as appropriate.     Electronically signed by:    Teresita Pop, PTA  8/9/2024, 12:42 PM

## 2024-08-09 NOTE — PROGRESS NOTES
Comprehensive Nutrition Assessment    Type and Reason for Visit:  Reassess    Nutrition Recommendations/Plan:   Continue carb controlled diet with soft and bite sized food  Continue to offer diabetic oral nutrition supplement, may drink between meals   Assist with meals, encourage intake   Will continue to follow up during stay     Malnutrition Assessment:  Malnutrition Status:  Severe malnutrition (08/01/24 1428)    Context:  Chronic Illness     Findings of the 6 clinical characteristics of malnutrition:  Energy Intake:  75% or less estimated energy requirements for 1 month or longer  Weight Loss:  Greater than 10% over 6 months (18% in 8 months)     Body Fat Loss:  Unable to assess     Muscle Mass Loss:  Unable to assess    Fluid Accumulation:  No significant fluid accumulation     Strength:  Not Performed    Nutrition Assessment:    NPO this morning but able to resume diet this afternoon carb controlled, soft and bite sized with diabetic oral nutrition supplements with meals. Limited po data, recent meal 25-50%. Had EGD with dilation yesterday. Hopefully intake will improve with recent dilation. Will continue to follow at high nutrition risk at this time with reduced intake during stay.    Nutrition Related Findings:    asleep in bed, may discharge today to SNF  humerus fx from fall-nonsurgical treatment   painful during stay Wound Type: None       Current Nutrition Intake & Therapies:    Average Meal Intake: 26-50%  Average Supplements Intake: Unable to assess  ADULT ORAL NUTRITION SUPPLEMENT; Breakfast, Lunch, Dinner; Diabetic Oral Supplement  ADULT DIET; Dysphagia - Soft and Bite Sized; 5 carb choices (75 gm/meal)    Anthropometric Measures:  Height: 172.7 cm (5' 8\")  Ideal Body Weight (IBW): 154 lbs (70 kg)    Admission Body Weight: 75.8 kg (167 lb)  Current Body Weight: 75.3 kg (166 lb 0.1 oz), 108.4 % IBW. Weight Source: Bed Scale  Current BMI (kg/m2): 25.2  Usual Body Weight: 92 kg (202 lb 13.2 oz)

## 2024-08-09 NOTE — DISCHARGE SUMMARY
Please use this note as a progress note for the day if the patient does not discharge today      Discharge Summary    Name:  Greg Easton /Age/Sex: 1940  (83 y.o. male)   MRN & CSN:  [id number] & [id number] Admission Date/Time: 2024  9:46 PM   Attending:  Kristopher Peralta MD Discharging Physician: Kristopher Peralta MD       Admission Diagnosis:   Presyncope/syncope in the setting of UTI   Rt. Humerus fracture   Chronic suprapubic catheter   IDDM   HTN   Urinary Incontinence   GERD   Hypothyroidism   Chronic HFpEF  Hypothyroidism     Discharge Diagnosis, hospital course, assessment and plan:  Greg Easton is a 83 y.o.  male  who presents with Shoulder pain    Patient admitted on 2024  9:46 PM    Per H+P:    Greg Easton is a 83-year-old male with impaired ambulation history of chronic indwelling suprapubic catheter.  History of MDRO E. coli with admission in May 2024 was on meropenem not on antibiotics now presented to the hospital after a syncopal episode at home patient has a history of full syncope secondary to orthostatic patient came in with right-sided shoulder pain and shoulder the patient had a fall without trauma to the head.  Urine did seem infected in the ED uses walker at baseline unable to ambulate at home.  To be started on antibiotics for UTI received pain medications in the ED denies any nausea vomiting diarrhea constipation dizziness lightheaded       Pre-syncope vs Syncope 2/2 Likely UTI   Rt. Humerus Fracture. Non-operative per ortho. Continue PT/OT. Asked ortho to re-evaluate  ESBL E Coli and Pan-sensitive Proteus UTI with chronic indwelling suprapubic cath. Cath changed 2 days prior to presentation by urology. ID was on board. Discharge on IV ertepenem 500mg IV Q24H - end on 2024  E Coli Bacteremia.   PAMELLA on CKD. Baseline Cr 2.1-2.3. Peaked at 3.3, now 2.6 and stable. Nephro on board.   Hyperkalemia. K 4.6.  Splenic granuloma. Hem/onc on board.   Ambulatory  lispro 100 UNIT/ML Soln injection vial  Commonly known as: HUMALOG,ADMELOG     oxyBUTYnin 5 MG tablet  Commonly known as: DITROPAN     torsemide 20 MG tablet  Commonly known as: DEMADEX               Where to Get Your Medications        You can get these medications from any pharmacy    Bring a paper prescription for each of these medications  HYDROcodone-acetaminophen 5-325 MG per tablet       Information about where to get these medications is not yet available    Ask your nurse or doctor about these medications  ertapenem  infusion  gabapentin 100 MG capsule  omeprazole 40 MG delayed release capsule         Objective Findings at Discharge:       BMP/CBC  Recent Labs     08/07/24  0213 08/07/24  1035 08/08/24  0356 08/09/24  0229     --  143 144   K 6.0*  --  4.6 4.5     --  105 106   CO2 22  --  26 27   BUN 71*  --  64* 60*   CREATININE 3.0*  --  2.6* 2.6*   WBC 7.3  --  7.2 5.8   HCT 22.2* 28.8* 26.7* 27.1*     --  249 246       Results       Procedure Component Value Units Date/Time    Culture, Blood 1 [[id number]]  (Abnormal)  (Susceptibility) Collected: 08/01/24 0925    Order Status: Completed Specimen: Blood Updated: 08/04/24 0703     Specimen BLOOD     Special Requests 1 OUT OF 3 BOTTLES POSITIVE FOR E. COLI ESBL. NOTIFIED DR SHARMA 1412 8/2 AMORRIS MLS     Culture Final Report      ESCHERICHIA COLI ESBL POSITIVE for Isolated one of two sets CONTACT PRECAUTIONS INDICATED Carbapenem antibiotics are the best option for infections caused by ESBL producing organisms.  Other antibiotic classes are likely to result in treatment failure, even for organisms demonstrating in vitro susceptibility.      Narrative:      SETUP DATE/TIME:  08/01/2024 1014    Susceptibility        Escherichia coli ESBL      BACTERIAL SUSCEPTIBILITY PANEL USMAN      amoxicillin-clavulanate (f) <=8/4  Sensitive      ampicillin >16  Resistant      ampicillin-sulbactam <=4/2  Sensitive      ceFAZolin >16  Resistant

## 2024-08-09 NOTE — DISCHARGE SUMMARY
Patient transferred to [facility] via [facility], son on site. Son has patient's belongings, [facility] has packet containing discharge information including script for pain medication.     Called [facility] at [phone] to give report. Left callback information with [place]. Receiving nurse un known at this time.

## 2024-08-09 NOTE — PROGRESS NOTES
[facility]             MD Sallie Noriega MD Carol Christensen, APRN-CNP       Janell Holt, APRN-CNP             [address] [place] [address]             [phone] fax [phone]      Gastroenterology Progress note . 8/9/2024  Reason for consult:  Anemia     Physician attestation:  I have personally seen and examined the patient independently. I have reviewed the patient's available data,including medical history and recent test results. Reviewed and discussed note as documented by NENA.  I agree with the physical exam findings, assessment and plan.     Briefly   EGD with me yesterday.  Low-grade narrowing of the Schatzki's ring, dilated, 4 cm hiatal hernia, gastritis.  No bleeding noted.  Patient hemoglobin noted to be 8.1.  Patient was tentatively scheduled for a colonoscopy however prep not done.  Discussed with son will hold off on inpatient colonoscopy with no bleeding observed and hemoglobin stable.  Would reconsider as an outpatient.    Gen: normal mood, alert  ENT: normal TJ  Cardiovascular: RRR, No M/R/G  Respiratory: CTA BL  Gastrointestinal: Soft,NT ND  Genitourinary: no suprapubic tenderness  Musculoskeletal: no scars  Skin:moist  Neuro: alert and oriented x3    My assessment and plan reveals   #1 acute on chronic anemia.  Concern for occult GI bleed.  Baseline hemoglobin of 8-8.5.  Has followed up with hematology oncology.  Was planned for outpatient EGD and colonoscopy however has had a mechanical fall and admitted over here.  EGD inpatient with no active bleeding noted to have mild gastritis.  Was planned for colonoscopy however did not do the prep.  Currently anemia stable.  No further melena or hematochezia.  Discussed with son we will hold off on inpatient colonoscopy plan for it as an outpatient.     #2 GERD.  Continue PPI therapy.     3.  Unintended weight loss.  CT chest negative for metastatic

## 2024-08-09 NOTE — PROGRESS NOTES
Occupational Therapy Treatment Note    Name: Greg Easton MRN: [id number] :   1940   Date:  2024   Admission Date: 2024 Room:  [address] [place] [address]       Restrictions/Precautions:  Restrictions/Precautions  Restrictions/Precautions: Weight Bearing, Fall Risk, General Precautions     Upper Extremity Weight Bearing Restrictions  Right Upper Extremity Weight Bearing: Non Weight Bearing (sling)     Communication with other providers: Per chart review and Nurse patient is appropriate for therapeutic intervention.     Subjective:  Patient states:  Agreeable to OT. \"I would like to sit up?\"  Pain:   Location, Type, Intensity (0/10 to 10/10):  7/10 R shoulder    Objective:    Observation: In semi streeter's position upon arrival   Objective Measures:  Alert and oriented    Treatment, including education:  Therapeutic Activity Training:   Therapeutic activity training was instructed today.  Cues were given for safety, sequence, UE/LE placement, awareness, and balance.    Bed mobility:Max A from elevated HOB  Sitting balance:initial 2 mins mod A due to posterior lean with L lateral lean; however, progress to SUP with F upright posture, tolerated 20 mins with 1 LUE support at all times  Sit/stand transfers:Max A utilizing bimal walker x3 reps for 10-30 sec with F- follow for complete stand. Unable to bring hips forward over BLE, significant kyphotic posture  Functional Mobility: n/a    Activities performed today included bed mobility training, transfers, functional mobility  to increase strength, activity tolerance to facilitate IND c ADL tasks, func transfers / mobility with G safety awareness carryover    Self Care Training:   Cues were given for safety, sequence, UE/LE placement, visual cues, and balance.    Activities performed today included LB/UB bathing, toileting, personal hygiene, and grooming task. Completed perineal area in supine position due to fatigue from ADL EOB activities. Demo mod A for UB and LB

## 2024-08-09 NOTE — PLAN OF CARE
Discussed with Dr. Suarez. Dr. Suarez ok with midline for IV abx on discharge.     Electronically signed by Kristopher Peralta MD on 8/9/2024 at 12:28 PM

## 2024-08-09 NOTE — PROGRESS NOTES
Nephrology Progress Note  8/9/2024 8:44 AM        Subjective:   Admit Date: 7/31/2024  PCP: Jacobo Zazueta MD    Interval History: No major event  He was much more alert awake and interactive this morning     Diet: Some    ROS: No shortness of breath  No fever and acceptable blood pressure, urine output recorded only 500 cc I suspect it was not accurately recorded  Looks like undergone esophagogastroduodenoscopy and underwent dilation as well as biopsy    Data:     Current meds:    gabapentin  100 mg Oral BID    heparin (porcine)  5,000 Units SubCUTAneous 3 times per day    amLODIPine  10 mg Oral Daily    hydrALAZINE  100 mg Oral 3 times per day    insulin lispro  0-4 Units SubCUTAneous TID WC    insulin lispro  0-4 Units SubCUTAneous Nightly    levothyroxine  75 mcg Oral Daily    sodium chloride flush  5-40 mL IntraVENous 2 times per day    miconazole   Topical BID      sodium chloride      dextrose      sodium chloride 25 mL/hr at 08/05/24 0340         I/O last 3 completed shifts:  In: 40 [I.V.:40]  Out: 1550 [Urine:1550]    CBC:   Recent Labs     08/07/24  0213 08/07/24  1035 08/08/24  0356 08/09/24  0229   WBC 7.3  --  7.2 5.8   HGB 6.6* 8.9* 8.3* 8.1*     --  249 246          Recent Labs     08/07/24  0213 08/08/24  0356 08/09/24  0229    143 144   K 6.0* 4.6 4.5    105 106   CO2 22 26 27   BUN 71* 64* 60*   CREATININE 3.0* 2.6* 2.6*   GLUCOSE 179* 195* 212*       Lab Results   Component Value Date    CALCIUM 8.5 08/09/2024    PHOS 3.6 08/09/2024       Objective:     Vitals: /66   Pulse 73   Temp 98.2 °F (36.8 °C) (Oral)   Resp 15   Ht 1.727 m (5' 8\")   Wt 75.3 kg (166 lb 0.1 oz)   SpO2 94%   BMI 25.24 kg/m² ,    General appearance: He was alert alert but frail looking  HEENT: At least 2+ conjunctival pallor but no scleral icterus  Neck: Supple  Lungs: No crackles on anterior exam  Heart: Regular rate and rhythm  Abdomen: Soft, nontender  Extremities: No gross

## 2024-08-09 NOTE — CONSULTS
Consult completed without need for additional IV access. PICC/MidLine contraindicated r/t CKD4 per Hospital Policy and would require Nephrology OK. Pt only needs 2 additional days of nonvesicant IV Merrem and has a patent #[id number] Nexiva extra-long, 1.75\" PIV placed per last night's PICC RN. Existing access meets pt's therapeutic needs. Line patency confirmed at bedside and D/C POC discussed with pt, who agrees to treatment plan. Cindi, Unit Charge RN and Maira, Primary RN notified. Sandy, Case Management RN notified and also notifies Maira from D/C SNF who agrees with POC and states even if line patency were lost, they have staff that can initiate a new IV. No other c/o or needs noted or reported at this time.

## 2024-08-09 NOTE — PLAN OF CARE
Problem: Discharge Planning  Goal: Discharge to home or other facility with appropriate resources  8/9/2024 1431 by Maira Shoemaker RN  Outcome: Completed  8/9/2024 0156 by Rina Harmon LPN  Outcome: Progressing     Problem: Safety - Adult  Goal: Free from fall injury  8/9/2024 1431 by Maira Shoemaker RN  Outcome: Completed  8/9/2024 0156 by Rina Harmon LPN  Outcome: Progressing     Problem: ABCDS Injury Assessment  Goal: Absence of physical injury  8/9/2024 1431 by Maira Shoemaker RN  Outcome: Completed  8/9/2024 0156 by Rina Harmon LPN  Outcome: Progressing     Problem: Pain  Goal: Verbalizes/displays adequate comfort level or baseline comfort level  8/9/2024 1431 by Maira Shoemaker RN  Outcome: Completed  8/9/2024 0156 by Rina Harmon LPN  Outcome: Progressing     Problem: Nutrition Deficit:  Goal: Optimize nutritional status  8/9/2024 1431 by Maira Shoemaker RN  Outcome: Completed  8/9/2024 0156 by Rina Harmon LPN  Outcome: Progressing     Problem: Chronic Conditions and Co-morbidities  Goal: Patient's chronic conditions and co-morbidity symptoms are monitored and maintained or improved  8/9/2024 1431 by Maira Shoemaker RN  Outcome: Completed  8/9/2024 0156 by Rina Harmon LPN  Outcome: Progressing     Problem: Skin/Tissue Integrity  Goal: Absence of new skin breakdown  Description: 1.  Monitor for areas of redness and/or skin breakdown  2.  Assess vascular access sites hourly  3.  Every 4-6 hours minimum:  Change oxygen saturation probe site  4.  Every 4-6 hours:  If on nasal continuous positive airway pressure, respiratory therapy assess nares and determine need for appliance change or resting period.  8/9/2024 1431 by Maira Shoemaker RN  Outcome: Completed  8/9/2024 0156 by Rina Harmon LPN  Outcome: Progressing     Problem: Neurosensory - Adult  Goal: Achieves stable or improved neurological status  8/9/2024 1431

## 2024-08-09 NOTE — CONSULTS
[facility] Nurse  Consult Note       Greg Easton  AGE: 83 y.o.   GENDER: male  : 1940  TODAY'S DATE:  2024    Subjective:     Reason for Evaluation and Assessment: wound care eval.      Greg Easton is a 83 y.o. male referred by:   [x] Physician  [] Nursing  [] Other:     Wound Identification:  Wound Type: pressure and masd  Contributing Factors: chronic pressure, decreased mobility, and malnutrition, diabetes        PAST MEDICAL HISTORY        Diagnosis Date    Acute kidney failure (HCC)     Acute urinary tract infection 03/15/2012    Anemia     Ataxia 2010    Ataxia     Bacteremia     Candidiasis     Chronic combined systolic and diastolic congestive heart failure (HCC)     Chronic kidney disease     Cognitive communication deficit     Diabetes mellitus (HCC) 2011    type 2, controlled    Extended spectrum beta lactamase (ESBL) resistance     Fusion of spine of cervical region 10/01/2012    Gait disturbance 2011    History of prostate cancer 1996    adenocarcinoma    History of tobacco use 1959    Hydronephrosis     Hyperkalemia     Hyperlipidemia 2011    Hypertension     Hypertensive heart disease with CHF (congestive heart failure) (HCC)     Hypotension     Immunodeficiency (HCC)     Metabolic encephalopathy     Muscle weakness     Osteoarthritis 2011    Osteoarthritis     Secondary Hyperaldosteronism (HCC)     Supraventricular tachycardia (HCC)     Tubulo-interstitial nephritis        PAST SURGICAL HISTORY    Past Surgical History:   Procedure Laterality Date    BLADDER SURGERY      BLADDER SURGERY Left 2022    CYSTOSCOPY LEFT STENT EXCHANGE performed by Bal Mckeon MD at [facility]    CHANGE OF BLADDER TUBE,COMPLICATED  2024    COLON SURGERY      CYSTOSCOPY Left 2022    LEFT CYSTOSCOPY URETERAL STENT EXCHANGE AND SUPRABUPIC PACE CATHETER EXCHANGE performed by Mirella Wilkins MD at [facility] OR    CYSTOSCOPY Left  Acute metabolic encephalopathy due to hypoglycemia    History of prostate cancer    Cigarette nicotine dependence without complication    Altered mental status    Serratia marcescens infection    Hypoglycemia    Fungemia    Pyelonephritis    Bacteremia due to Enterococcus    Chronic combined systolic (congestive) and diastolic (congestive) heart failure (HCC)    Chronic idiopathic constipation    Constipation    Fecal impaction (HCC)    Stercoral colitis    Syncope    Debility    Acute cystitis without hematuria    Acute on chronic urinary retention    Moderate protein-calorie malnutrition (HCC)    Shoulder pain    Severe malnutrition (HCC)    Splenic calcification    Chronic anemia    Schatzki's ring of distal esophagus    Drop in hemoglobin       Measurements:  Wound 10/28/23 Groin Medial pinpoint opening surrounded by red unblanchable skin (Active)   Number of days: 285       Wound 08/09/24 Heel Right (Active)   Wound Image   08/09/24 0818   Wound Etiology Deep tissue/Injury 08/09/24 0818   Wound Cleansed Not Cleansed 08/09/24 0818   Dressing/Treatment Open to air 08/09/24 0818   Wound Length (cm) 2 cm 08/09/24 0818   Wound Width (cm) 2 cm 08/09/24 0818   Wound Depth (cm) 0 cm 08/09/24 0818   Wound Surface Area (cm^2) 4 cm^2 08/09/24 0818   Wound Volume (cm^3) 0 cm^3 08/09/24 0818   Distance Tunneling (cm) 0 cm 08/09/24 0818   Tunneling Position ___ O'Clock 0 08/09/24 0818   Undermining Starts ___ O'Clock 0 08/09/24 0818   Undermining Ends___ O'Clock 0 08/09/24 0818   Undermining Maxium Distance (cm) 0 08/09/24 0818   Wound Assessment Purple/maroon 08/09/24 0818   Drainage Amount None (dry) 08/09/24 0818   Odor None 08/09/24 0818   Kimberly-wound Assessment Intact 08/09/24 0818   Margins Attached edges 08/09/24 0818   Number of days: 0       Wound 08/09/24 Heel Left (Active)   Wound Image   08/09/24 0818   Wound Etiology Deep tissue/Injury 08/09/24 0818   Wound Cleansed Not Cleansed 08/09/24 0818   Dressing/Treatment

## 2024-08-12 ENCOUNTER — HOSPITAL ENCOUNTER (OUTPATIENT)
Age: 84
Setting detail: SPECIMEN
Discharge: HOME | End: 2024-08-12
Payer: MEDICARE

## 2024-08-12 LAB
ALBUMIN SERPL-MCNC: 3.1 GM/DL (ref 3.4–5)
ALP BLD-CCNC: 77 IU/L (ref 40–128)
ALT SERPL-CCNC: 8 U/L (ref 10–40)
ANION GAP SERPL CALCULATED.3IONS-SCNC: 10 MMOL/L (ref 7–16)
AST SERPL-CCNC: 14 IU/L (ref 15–37)
BASOPHILS ABSOLUTE: 0 K/CU MM
BASOPHILS RELATIVE PERCENT: 0.4 % (ref 0–1)
BILIRUB SERPL-MCNC: 0.3 MG/DL (ref 0–1)
BUN SERPL-MCNC: 43 MG/DL (ref 6–23)
CALCIUM SERPL-MCNC: 9 MG/DL (ref 8.3–10.6)
CHLORIDE BLD-SCNC: 107 MMOL/L (ref 99–110)
CO2: 25 MMOL/L (ref 21–32)
CREAT SERPL-MCNC: 2.2 MG/DL (ref 0.9–1.3)
DIFFERENTIAL TYPE: ABNORMAL
EOSINOPHILS ABSOLUTE: 0.5 K/CU MM
EOSINOPHILS RELATIVE PERCENT: 7.8 % (ref 0–3)
GFR, ESTIMATED: 29 ML/MIN/1.73M2
GLUCOSE SERPL-MCNC: 209 MG/DL (ref 70–99)
HCT VFR BLD CALC: 26.7 % (ref 42–52)
HEMOGLOBIN: 8 GM/DL (ref 13.5–18)
IMMATURE NEUTROPHIL %: 0.7 % (ref 0–0.43)
LYMPHOCYTES ABSOLUTE: 1.5 K/CU MM
LYMPHOCYTES RELATIVE PERCENT: 21.4 % (ref 24–44)
MCH RBC QN AUTO: 29.3 PG (ref 27–31)
MCHC RBC AUTO-ENTMCNC: 30 % (ref 32–36)
MCV RBC AUTO: 97.8 FL (ref 78–100)
MONOCYTES ABSOLUTE: 0.7 K/CU MM
MONOCYTES RELATIVE PERCENT: 9.6 % (ref 0–4)
NEUTROPHILS ABSOLUTE: 4.1 K/CU MM
NEUTROPHILS RELATIVE PERCENT: 60.1 % (ref 36–66)
NUCLEATED RBC %: 0 %
PDW BLD-RTO: 15.6 % (ref 11.7–14.9)
PLATELET # BLD: 298 K/CU MM (ref 140–440)
PMV BLD AUTO: 10.2 FL (ref 7.5–11.1)
POTASSIUM SERPL-SCNC: 4.7 MMOL/L (ref 3.5–5.1)
RBC # BLD: 2.73 M/CU MM (ref 4.6–6.2)
SODIUM BLD-SCNC: 142 MMOL/L (ref 135–145)
TOTAL IMMATURE NEUTOROPHIL: 0.05 K/CU MM
TOTAL NUCLEATED RBC: 0 K/CU MM
TOTAL PROTEIN: 6.5 GM/DL (ref 6.4–8.2)
WBC # BLD: 6.8 K/CU MM (ref 4–10.5)

## 2024-08-12 PROCEDURE — 80053 COMPREHEN METABOLIC PANEL: CPT

## 2024-08-12 PROCEDURE — 85025 COMPLETE CBC W/AUTO DIFF WBC: CPT

## 2024-08-12 PROCEDURE — 36415 COLL VENOUS BLD VENIPUNCTURE: CPT

## 2024-08-19 ENCOUNTER — TELEPHONE (OUTPATIENT)
Dept: GASTROENTEROLOGY | Age: 84
End: 2024-08-19

## 2024-08-19 NOTE — TELEPHONE ENCOUNTER
Called pt to discuss results, no answer. LM for pt to call back.       ----- Message from VIVIANA Salguero CNP sent at 8/18/2024  6:57 PM EDT -----  Patient and inform them pathology  revealed some mild patchy inflammation in the stomach there were no H. pylori/infection found.  
Yes

## 2024-08-28 ENCOUNTER — TELEPHONE (OUTPATIENT)
Dept: GASTROENTEROLOGY | Age: 84
End: 2024-08-28

## 2024-08-28 NOTE — TELEPHONE ENCOUNTER
----- Message from VIVIANA Salguero CNP sent at 8/9/2024 10:06 AM EDT -----  Patient already had an order in for a double however he did undergo EGD with  here in the hospital.  But will still need an outpatient colonoscopy.  Please call his son Rustam to schedule.

## 2024-08-28 NOTE — TELEPHONE ENCOUNTER
Spoke with son Jon who asked his father if he wanted to gracie cscope today but pt stated he wanted to hold off on scheduling colonoscopy.

## 2024-08-29 ENCOUNTER — OFFICE VISIT (OUTPATIENT)
Dept: ORTHOPEDIC SURGERY | Age: 84
End: 2024-08-29

## 2024-08-29 VITALS — OXYGEN SATURATION: 97 % | HEART RATE: 90 BPM

## 2024-08-29 DIAGNOSIS — S42.231A CLOSED 3-PART FRACTURE OF SURGICAL NECK OF RIGHT HUMERUS, INITIAL ENCOUNTER: Primary | ICD-10-CM

## 2024-08-29 ASSESSMENT — ENCOUNTER SYMPTOMS
SHORTNESS OF BREATH: 0
COLOR CHANGE: 0
CHEST TIGHTNESS: 0

## 2024-08-29 NOTE — PROGRESS NOTES
degenerative changes present at the glenohumeral joint and acromioclavicular joint.  No dislocation of the glenohumeral joint. Decreased bone mineral density present throughout the shoulder.  Hardware in place of the cervical spine. Impression: Stable alignment comminuted proximal humerus fracture          Office Procedures:  Orders Placed This Encounter   Procedures    CLOSED TX PROX HUMERUS FRACTURE       Assessment and Plan  1.  Right comminuted mildly displaced surgical neck proximal humerus fracture    We reviewed his x-rays and discussed the stable alignment of the severely comminuted fracture.    The patient's disease process was reviewed in detail.  Treatment options were discussed including options of both operative and nonoperative treatment.  We discussed surgical intervention including the indications, benefits, risks, expected outcome, and recovery process.  We thoughtfully considered possible surgical intervention at this stage but have decided to pursue nonoperative measures at this time.  We will revisit surgery in more detail if conservative measures fail      I recommended that we continue with close management of this fracture at this time.  I provided him with a new sling.  He can do gentle repositioning of the arm as needed for comfort and bathing and changing close.    He is not ready for any therapy or weightbearing or lifting activities as of yet.    Follow-up here in 3 to 4 weeks for repeat x-rays we will discuss if he is ready to proceed with therapy activities.        Electronically signed by Erasmo Jorgensen MD on 8/29/2024 at 9:40 AM

## 2024-09-04 ENCOUNTER — HOSPITAL ENCOUNTER (OUTPATIENT)
Age: 84
Setting detail: SPECIMEN
Discharge: HOME | End: 2024-09-04

## 2024-09-04 LAB
ALBUMIN SERPL-MCNC: 3.1 GM/DL (ref 3.4–5)
ALP BLD-CCNC: 99 IU/L (ref 40–128)
ALT SERPL-CCNC: 6 U/L (ref 10–40)
ANION GAP SERPL CALCULATED.3IONS-SCNC: 13 MMOL/L (ref 7–16)
AST SERPL-CCNC: 11 IU/L (ref 15–37)
BASOPHILS ABSOLUTE: 0.1 K/CU MM
BASOPHILS RELATIVE PERCENT: 0.6 % (ref 0–1)
BILIRUB SERPL-MCNC: 0.2 MG/DL (ref 0–1)
BUN SERPL-MCNC: 34 MG/DL (ref 6–23)
CALCIUM SERPL-MCNC: 8.7 MG/DL (ref 8.3–10.6)
CHLORIDE BLD-SCNC: 106 MMOL/L (ref 99–110)
CO2: 23 MMOL/L (ref 21–32)
CREAT SERPL-MCNC: 2.2 MG/DL (ref 0.9–1.3)
DIFFERENTIAL TYPE: ABNORMAL
EOSINOPHILS ABSOLUTE: 0.9 K/CU MM
EOSINOPHILS RELATIVE PERCENT: 10.8 % (ref 0–3)
GFR, ESTIMATED: 28 ML/MIN/1.73M2
GLUCOSE SERPL-MCNC: 118 MG/DL (ref 70–99)
HCT VFR BLD CALC: 25.8 % (ref 42–52)
HEMOGLOBIN: 7.8 GM/DL (ref 13.5–18)
IMMATURE NEUTROPHIL %: 0.4 % (ref 0–0.43)
LYMPHOCYTES ABSOLUTE: 1.4 K/CU MM
LYMPHOCYTES RELATIVE PERCENT: 17.7 % (ref 24–44)
MCH RBC QN AUTO: 29.5 PG (ref 27–31)
MCHC RBC AUTO-ENTMCNC: 30.2 % (ref 32–36)
MCV RBC AUTO: 97.7 FL (ref 78–100)
MONOCYTES ABSOLUTE: 0.5 K/CU MM
MONOCYTES RELATIVE PERCENT: 6.6 % (ref 0–4)
NEUTROPHILS ABSOLUTE: 5.1 K/CU MM
NEUTROPHILS RELATIVE PERCENT: 63.9 % (ref 36–66)
NUCLEATED RBC %: 0 %
PDW BLD-RTO: 15.7 % (ref 11.7–14.9)
PLATELET # BLD: 284 K/CU MM (ref 140–440)
PMV BLD AUTO: 10.7 FL (ref 7.5–11.1)
POTASSIUM SERPL-SCNC: 4.2 MMOL/L (ref 3.5–5.1)
RBC # BLD: 2.64 M/CU MM (ref 4.6–6.2)
SODIUM BLD-SCNC: 142 MMOL/L (ref 135–145)
TOTAL IMMATURE NEUTOROPHIL: 0.03 K/CU MM
TOTAL NUCLEATED RBC: 0 K/CU MM
TOTAL PROTEIN: 6.9 GM/DL (ref 6.4–8.2)
WBC # BLD: 7.9 K/CU MM (ref 4–10.5)

## 2024-09-04 PROCEDURE — 80053 COMPREHEN METABOLIC PANEL: CPT

## 2024-09-04 PROCEDURE — 85025 COMPLETE CBC W/AUTO DIFF WBC: CPT

## 2024-09-04 PROCEDURE — 84145 PROCALCITONIN (PCT): CPT

## 2024-09-04 PROCEDURE — 36415 COLL VENOUS BLD VENIPUNCTURE: CPT

## 2024-09-06 LAB — PROCALCITONIN SERPL-MCNC: 0.37 NG/ML

## 2024-09-20 ENCOUNTER — HOSPITAL ENCOUNTER (OUTPATIENT)
Age: 84
Setting detail: SPECIMEN
Discharge: HOME | End: 2024-09-20
Payer: MEDICARE

## 2024-09-20 LAB
BACTERIA URNS QL MICRO: ABNORMAL
BILIRUB UR QL STRIP: NEGATIVE
CLARITY UR: ABNORMAL
COLOR UR: ABNORMAL
CRYSTALS URNS MICRO: ABNORMAL /HPF
GLUCOSE UR STRIP-MCNC: NEGATIVE MG/DL
HGB UR QL STRIP.AUTO: ABNORMAL
KETONES UR STRIP-MCNC: NEGATIVE MG/DL
LEUKOCYTE ESTERASE UR QL STRIP: ABNORMAL
MUCOUS THREADS URNS QL MICRO: ABNORMAL
NITRITE UR QL STRIP: POSITIVE
PH UR STRIP: >9 [PH] (ref 5–8)
PROT UR STRIP-MCNC: 100 MG/DL
RBC #/AREA URNS HPF: 190 /HPF (ref 0–2)
SP GR UR STRIP: 1.01 (ref 1–1.03)
TRICHOMONAS #/AREA URNS HPF: ABNORMAL /[HPF]
UROBILINOGEN UR STRIP-ACNC: 0.2 EU/DL (ref 0–1)
WBC #/AREA URNS HPF: 58 /HPF (ref 0–5)

## 2024-09-20 PROCEDURE — 87086 URINE CULTURE/COLONY COUNT: CPT

## 2024-09-20 PROCEDURE — 87077 CULTURE AEROBIC IDENTIFY: CPT

## 2024-09-20 PROCEDURE — 87186 SC STD MICRODIL/AGAR DIL: CPT

## 2024-09-20 PROCEDURE — 81001 URINALYSIS AUTO W/SCOPE: CPT

## 2024-09-22 LAB
MICROORGANISM SPEC CULT: ABNORMAL
SPECIMEN DESCRIPTION: ABNORMAL

## 2024-09-23 ENCOUNTER — OFFICE VISIT (OUTPATIENT)
Dept: GASTROENTEROLOGY | Age: 84
End: 2024-09-23
Payer: MEDICARE

## 2024-09-23 VITALS
HEIGHT: 68 IN | RESPIRATION RATE: 16 BRPM | DIASTOLIC BLOOD PRESSURE: 76 MMHG | HEART RATE: 82 BPM | SYSTOLIC BLOOD PRESSURE: 142 MMHG | OXYGEN SATURATION: 96 % | BODY MASS INDEX: 25.25 KG/M2

## 2024-09-23 DIAGNOSIS — K22.2 SCHATZKI'S RING OF DISTAL ESOPHAGUS: ICD-10-CM

## 2024-09-23 DIAGNOSIS — D50.9 IRON DEFICIENCY ANEMIA, UNSPECIFIED IRON DEFICIENCY ANEMIA TYPE: ICD-10-CM

## 2024-09-23 DIAGNOSIS — K59.09 CHRONIC CONSTIPATION: ICD-10-CM

## 2024-09-23 DIAGNOSIS — K21.9 GASTROESOPHAGEAL REFLUX DISEASE WITHOUT ESOPHAGITIS: Primary | ICD-10-CM

## 2024-09-23 PROCEDURE — G8417 CALC BMI ABV UP PARAM F/U: HCPCS | Performed by: NURSE PRACTITIONER

## 2024-09-23 PROCEDURE — 99213 OFFICE O/P EST LOW 20 MIN: CPT | Performed by: NURSE PRACTITIONER

## 2024-09-23 PROCEDURE — 1123F ACP DISCUSS/DSCN MKR DOCD: CPT | Performed by: NURSE PRACTITIONER

## 2024-09-23 PROCEDURE — 1036F TOBACCO NON-USER: CPT | Performed by: NURSE PRACTITIONER

## 2024-09-23 PROCEDURE — G8427 DOCREV CUR MEDS BY ELIG CLIN: HCPCS | Performed by: NURSE PRACTITIONER

## 2024-09-24 ENCOUNTER — OFFICE VISIT (OUTPATIENT)
Dept: ORTHOPEDIC SURGERY | Age: 84
End: 2024-09-24

## 2024-09-24 VITALS — OXYGEN SATURATION: 98 % | HEIGHT: 67 IN | BODY MASS INDEX: 26 KG/M2 | HEART RATE: 78 BPM

## 2024-09-24 DIAGNOSIS — S42.231D CLOSED 3-PART FRACTURE OF SURGICAL NECK OF RIGHT HUMERUS WITH ROUTINE HEALING, SUBSEQUENT ENCOUNTER: Primary | ICD-10-CM

## 2024-09-24 PROBLEM — K56.41 FECAL IMPACTION (HCC): Status: RESOLVED | Noted: 2024-03-07 | Resolved: 2024-09-24

## 2024-09-24 PROCEDURE — 99024 POSTOP FOLLOW-UP VISIT: CPT | Performed by: ORTHOPAEDIC SURGERY

## 2024-09-24 NOTE — PROGRESS NOTES
Pt returns to the office for follow up on right humerous fx, DOI: 7/31/24. Pt states he has no pain as long as he does not move it. Pt states he has been staying non weight barring since the injury, and has stayed in the sling. Pt has no new concerns at this time.

## 2024-09-24 NOTE — PATIENT INSTRUCTIONS
Weight barring as tolerated and may start physical therapy as tolerated   May discontinue using the sling  Follow up in 6 weeks

## 2024-09-25 ENCOUNTER — HOSPITAL ENCOUNTER (OUTPATIENT)
Age: 84
Setting detail: SPECIMEN
Discharge: HOME | End: 2024-09-25
Payer: MEDICARE

## 2024-09-25 LAB
EST. AVERAGE GLUCOSE BLD GHB EST-MCNC: 179 MG/DL
HBA1C MFR BLD: 7.9 % (ref 4.2–6.3)

## 2024-09-25 PROCEDURE — 36415 COLL VENOUS BLD VENIPUNCTURE: CPT

## 2024-09-25 PROCEDURE — 83036 HEMOGLOBIN GLYCOSYLATED A1C: CPT

## 2024-09-30 NOTE — PROGRESS NOTES
9/24/2024   Chief Complaint   Patient presents with    Injury    Follow-up     Right humerous FX        History of Present Illness:                             Greg Easton is a 84 y.o. male returns today for follow-up on his right shoulder.  He has been using a sling for protection and has been nonweightbearing.  He feels that his shoulder is doing reasonably okay and denies any problems at rest.    Pt returns to the office for follow up on right humerous fx, DOI: 7/31/24. Pt states he has no pain as long as he does not move it. Pt states he has been staying non weight barring since the injury, and has stayed in the sling. Pt has no new concerns at this time.       Medical History  Patient's medications, allergies, past medical, surgical, social and family histories were reviewed and updated as appropriate.      Review of Systems                                            Examination:  General Exam:  Vitals: Pulse 78   Ht 1.702 m (5' 7\")   SpO2 98%   BMI 26.00 kg/m²    Physical Exam     Right upper extremity:  There is improved tenderness to palpation along the healing fracture site of the proximal humerus shaft and neck.  Normal alignment of the shoulder with no gross malalignment.    No pain or instability with gentle rotational and pendular movements of the arm at the side.  Sensation and motor function is intact throughout the upper extremity.  No instability with passive motion during abduction and forward elevation but range of motion is severely limited due to his recent immobilization.    Diagnostic testing:  X-rays reviewed in office, I independently reviewed the films in the office today:     X-ray images of the right shoulder show stable alignment of the severely comminuted proximal humerus fracture with significant butterfly fragments and comminution present through the surgical neck and proximal shaft of the humerus.  Maintained alignment of the glenohumeral joint.  Interval bony healing

## 2024-10-16 ENCOUNTER — HOSPITAL ENCOUNTER (OUTPATIENT)
Age: 84
Setting detail: SPECIMEN
Discharge: HOME | End: 2024-10-16
Payer: MEDICARE

## 2024-10-16 LAB
BILIRUB UR QL STRIP: NEGATIVE
CHARACTER UR: ABNORMAL
CLARITY UR: ABNORMAL
COLOR UR: YELLOW
GLUCOSE UR STRIP-MCNC: NEGATIVE MG/DL
HGB UR QL STRIP.AUTO: ABNORMAL
KETONES UR STRIP-MCNC: NEGATIVE MG/DL
LEUKOCYTE ESTERASE UR QL STRIP: ABNORMAL
NITRITE UR QL STRIP: POSITIVE
PH UR STRIP: 7 [PH] (ref 5–8)
PROT UR STRIP-MCNC: 30 MG/DL
RBC #/AREA URNS HPF: 63 /HPF (ref 0–2)
SP GR UR STRIP: 1.01 (ref 1–1.03)
TRICHOMONAS #/AREA URNS HPF: ABNORMAL /[HPF]
UROBILINOGEN UR STRIP-ACNC: 0.2 EU/DL (ref 0–1)
WBC #/AREA URNS HPF: 142 /HPF (ref 0–5)

## 2024-10-16 PROCEDURE — 81001 URINALYSIS AUTO W/SCOPE: CPT

## 2024-10-16 PROCEDURE — 87186 SC STD MICRODIL/AGAR DIL: CPT

## 2024-10-16 PROCEDURE — 87086 URINE CULTURE/COLONY COUNT: CPT

## 2024-10-16 PROCEDURE — 87077 CULTURE AEROBIC IDENTIFY: CPT

## 2024-10-20 LAB
MICROORGANISM SPEC CULT: ABNORMAL
SPECIMEN DESCRIPTION: ABNORMAL

## 2024-11-05 ENCOUNTER — HOSPITAL ENCOUNTER (EMERGENCY)
Age: 84
Discharge: HOME | End: 2024-11-05
Payer: MEDICARE

## 2024-11-05 ENCOUNTER — APPOINTMENT (OUTPATIENT)
Dept: GENERAL RADIOLOGY | Age: 84
End: 2024-11-05
Payer: MEDICARE

## 2024-11-05 ENCOUNTER — OFFICE VISIT (OUTPATIENT)
Dept: ORTHOPEDIC SURGERY | Age: 84
End: 2024-11-05

## 2024-11-05 ENCOUNTER — APPOINTMENT (OUTPATIENT)
Dept: CT IMAGING | Age: 84
End: 2024-11-05
Payer: MEDICARE

## 2024-11-05 VITALS
WEIGHT: 166 LBS | RESPIRATION RATE: 19 BRPM | HEART RATE: 81 BPM | DIASTOLIC BLOOD PRESSURE: 73 MMHG | BODY MASS INDEX: 26.06 KG/M2 | TEMPERATURE: 97.6 F | HEIGHT: 67 IN | OXYGEN SATURATION: 96 % | SYSTOLIC BLOOD PRESSURE: 160 MMHG

## 2024-11-05 VITALS — BODY MASS INDEX: 26 KG/M2 | OXYGEN SATURATION: 97 % | HEART RATE: 53 BPM | HEIGHT: 67 IN

## 2024-11-05 DIAGNOSIS — R82.90 ABNORMAL URINALYSIS: ICD-10-CM

## 2024-11-05 DIAGNOSIS — S42.231D CLOSED 3-PART FRACTURE OF SURGICAL NECK OF RIGHT HUMERUS WITH ROUTINE HEALING, SUBSEQUENT ENCOUNTER: Primary | ICD-10-CM

## 2024-11-05 DIAGNOSIS — E87.5 HYPERKALEMIA: Primary | ICD-10-CM

## 2024-11-05 DIAGNOSIS — R53.83 FATIGUE, UNSPECIFIED TYPE: ICD-10-CM

## 2024-11-05 DIAGNOSIS — N18.9 CHRONIC KIDNEY DISEASE, UNSPECIFIED CKD STAGE: ICD-10-CM

## 2024-11-05 LAB
ALBUMIN SERPL-MCNC: 3.5 G/DL (ref 3.4–5)
ALBUMIN/GLOB SERPL: 1.3 {RATIO} (ref 1.1–2.2)
ALP SERPL-CCNC: 95 U/L (ref 40–129)
ALT SERPL-CCNC: 19 U/L (ref 10–40)
ANION GAP SERPL CALCULATED.3IONS-SCNC: 11 MMOL/L (ref 9–17)
AST SERPL-CCNC: 30 U/L (ref 15–37)
BASOPHILS # BLD: 0.05 K/UL
BASOPHILS NFR BLD: 1 % (ref 0–1)
BILIRUB SERPL-MCNC: <0.2 MG/DL (ref 0–1)
BILIRUB UR QL STRIP: NEGATIVE
BUN SERPL-MCNC: 46 MG/DL (ref 7–20)
CALCIUM SERPL-MCNC: 8.6 MG/DL (ref 8.3–10.6)
CHARACTER UR: ABNORMAL
CHLORIDE SERPL-SCNC: 108 MMOL/L (ref 99–110)
CLARITY UR: ABNORMAL
CO2 SERPL-SCNC: 22 MMOL/L (ref 21–32)
COLOR UR: YELLOW
CREAT SERPL-MCNC: 2.3 MG/DL (ref 0.8–1.3)
EKG ATRIAL RATE: 75 BPM
EKG DIAGNOSIS: NORMAL
EKG Q-T INTERVAL: 404 MS
EKG QRS DURATION: 88 MS
EKG QTC CALCULATION (BAZETT): 454 MS
EKG R AXIS: -5 DEGREES
EKG T AXIS: 84 DEGREES
EKG VENTRICULAR RATE: 76 BPM
EOSINOPHIL # BLD: 0.97 K/UL
EOSINOPHILS RELATIVE PERCENT: 13 % (ref 0–3)
EPI CELLS #/AREA URNS HPF: <1 /HPF
ERYTHROCYTE [DISTWIDTH] IN BLOOD BY AUTOMATED COUNT: 17.5 % (ref 11.7–14.9)
GFR, ESTIMATED: 27 ML/MIN/1.73M2
GLUCOSE BLD-MCNC: 236 MG/DL (ref 74–99)
GLUCOSE SERPL-MCNC: 171 MG/DL (ref 74–99)
GLUCOSE UR STRIP-MCNC: NEGATIVE MG/DL
HCT VFR BLD AUTO: 27.3 % (ref 42–52)
HGB BLD-MCNC: 7.9 G/DL (ref 13.5–18)
HGB UR QL STRIP.AUTO: ABNORMAL
IMM GRANULOCYTES # BLD AUTO: 0.01 K/UL
IMM GRANULOCYTES NFR BLD: 0 %
KETONES UR STRIP-MCNC: NEGATIVE MG/DL
LEUKOCYTE ESTERASE UR QL STRIP: ABNORMAL
LYMPHOCYTES NFR BLD: 1.54 K/UL
LYMPHOCYTES RELATIVE PERCENT: 20 % (ref 24–44)
MCH RBC QN AUTO: 30.6 PG (ref 27–31)
MCHC RBC AUTO-ENTMCNC: 28.9 G/DL (ref 32–36)
MCV RBC AUTO: 105.8 FL (ref 78–100)
MONOCYTES NFR BLD: 0.65 K/UL
MONOCYTES NFR BLD: 9 % (ref 0–4)
MUCOUS THREADS URNS QL MICRO: ABNORMAL
NEUTROPHILS NFR BLD: 57 % (ref 36–66)
NEUTS SEG NFR BLD: 4.32 K/UL
NITRITE UR QL STRIP: POSITIVE
PH UR STRIP: 8 [PH] (ref 5–8)
PLATELET # BLD AUTO: 211 K/UL (ref 140–440)
PMV BLD AUTO: 10.2 FL (ref 7.5–11.1)
POTASSIUM SERPL-SCNC: 5.5 MMOL/L (ref 3.5–5.1)
PROT SERPL-MCNC: 6.2 G/DL (ref 6.4–8.2)
PROT UR STRIP-MCNC: 100 MG/DL
RBC # BLD AUTO: 2.58 M/UL (ref 4.6–6.2)
RBC #/AREA URNS HPF: 118 /HPF (ref 0–2)
SODIUM SERPL-SCNC: 141 MMOL/L (ref 136–145)
SP GR UR STRIP: 1.01 (ref 1–1.03)
TRICHOMONAS #/AREA URNS HPF: ABNORMAL /[HPF]
TROPONIN I SERPL HS-MCNC: 42 NG/L (ref 0–22)
TROPONIN I SERPL HS-MCNC: 44 NG/L (ref 0–22)
UROBILINOGEN UR STRIP-ACNC: 0.2 EU/DL (ref 0–1)
WBC #/AREA URNS HPF: 278 /HPF (ref 0–5)
WBC OTHER # BLD: 7.5 K/UL (ref 4–10.5)

## 2024-11-05 PROCEDURE — 36415 COLL VENOUS BLD VENIPUNCTURE: CPT

## 2024-11-05 PROCEDURE — 93005 ELECTROCARDIOGRAM TRACING: CPT | Performed by: PHYSICIAN ASSISTANT

## 2024-11-05 PROCEDURE — 87086 URINE CULTURE/COLONY COUNT: CPT

## 2024-11-05 PROCEDURE — 99024 POSTOP FOLLOW-UP VISIT: CPT | Performed by: ORTHOPAEDIC SURGERY

## 2024-11-05 PROCEDURE — 80053 COMPREHEN METABOLIC PANEL: CPT

## 2024-11-05 PROCEDURE — 93010 ELECTROCARDIOGRAM REPORT: CPT | Performed by: INTERNAL MEDICINE

## 2024-11-05 PROCEDURE — 85025 COMPLETE CBC W/AUTO DIFF WBC: CPT

## 2024-11-05 PROCEDURE — 84484 ASSAY OF TROPONIN QUANT: CPT

## 2024-11-05 PROCEDURE — 99285 EMERGENCY DEPT VISIT HI MDM: CPT

## 2024-11-05 PROCEDURE — 6370000000 HC RX 637 (ALT 250 FOR IP): Performed by: PHYSICIAN ASSISTANT

## 2024-11-05 PROCEDURE — 71045 X-RAY EXAM CHEST 1 VIEW: CPT

## 2024-11-05 PROCEDURE — 81001 URINALYSIS AUTO W/SCOPE: CPT

## 2024-11-05 PROCEDURE — 70450 CT HEAD/BRAIN W/O DYE: CPT

## 2024-11-05 PROCEDURE — 82962 GLUCOSE BLOOD TEST: CPT

## 2024-11-05 RX ORDER — CIPROFLOXACIN 500 MG/1
250 TABLET, FILM COATED ORAL ONCE
Status: COMPLETED | OUTPATIENT
Start: 2024-11-05 | End: 2024-11-05

## 2024-11-05 RX ADMIN — CIPROFLOXACIN HYDROCHLORIDE 250 MG: 500 TABLET, FILM COATED ORAL at 18:10

## 2024-11-05 RX ADMIN — SODIUM ZIRCONIUM CYCLOSILICATE 10 G: 5 POWDER, FOR SUSPENSION ORAL at 18:11

## 2024-11-05 ASSESSMENT — PAIN - FUNCTIONAL ASSESSMENT: PAIN_FUNCTIONAL_ASSESSMENT: 0-10

## 2024-11-05 NOTE — PROGRESS NOTES
Patient returns to the office with right humerus fx DOI: 7/31/24. Pt stated that the pain has reduced since the last visit but notices it at night mostly with pressure on the shoulder. Pt stated he has been working with physical therapy with movement and weightbearing. Pt stated he is still pretty stiff above shoulder height.

## 2024-11-05 NOTE — PATIENT INSTRUCTIONS
Call if you would like us to order home therapy in the future after discharge.  Follow up in 2 months.

## 2024-11-05 NOTE — ED TRIAGE NOTES
Pt sent by nursing home for altered mental status, pt arrives to ED alert and oriented. Pt is upset he is here.

## 2024-11-05 NOTE — PROGRESS NOTES
11/5/2024   Chief Complaint   Patient presents with    Shoulder Injury     Right humerus fx DOI: 7/31/24        History of Present Illness:                             Greg Easton is a 84 y.o. male who returns today for follow-up of his right proximal humerus fracture    He has been working with therapy at his facility trying to increase his motion and use and function of his right upper extremity.  He feels things are gradually improving but he still has weakness and stiffness in the shoulder.  He has been able to participate with therapy and feels like he is making some subtle improvements with time.        Medical History  Patient's medications, allergies, past medical, surgical, social and family histories were reviewed and updated as appropriate.      Review of Systems                                            Examination:  General Exam:  Vitals: Pulse 53   Ht 1.702 m (5' 7\")   SpO2 97%   BMI 26.00 kg/m²    Physical Exam     Right upper extremity:  Improved but persistent mild tenderness to palpation overlying the proximal humerus.  Normal alignment of the shoulder.  No instability or pain with gentle passive motion including rotational movements of the shoulder and passive forward elevation of the arm.  Sensation and motor function is intact distally.  He is able to flex and extend his elbow and wrist.  He has very limited active forward elevation of the shoulder and has trouble getting his hand up to his head.      Office Procedures:  No orders of the defined types were placed in this encounter.      Assessment and Plan  1.  Right proximal humerus fracture, healing    I have reassured the patient that his fracture appears to be healing well on exam today with no setbacks or complications.  He has good alignment and stability across the fracture site and encouraged him to will continue working with his therapy to build up function and strength.    He will continue with his therapy at his facility.

## 2024-11-05 NOTE — ED PROVIDER NOTES
positives and negatives are stated within HPI    PAST HISTORY   has a past medical history of Acute kidney failure (HCC), Acute urinary tract infection, Anemia, Ataxia, Ataxia, Bacteremia, Candidiasis, Chronic combined systolic and diastolic congestive heart failure (HCC), Chronic kidney disease, Cognitive communication deficit, Diabetes mellitus (HCC), Extended spectrum beta lactamase (ESBL) resistance, Fusion of spine of cervical region, Gait disturbance, History of prostate cancer, History of tobacco use, Hydronephrosis, Hyperkalemia, Hyperlipidemia, Hypertension, Hypertensive heart disease with CHF (congestive heart failure) (HCC), Hypotension, Immunodeficiency (HCC), Metabolic encephalopathy, Muscle weakness, Osteoarthritis, Osteoarthritis, Secondary Hyperaldosteronism (HCC), Supraventricular tachycardia (HCC), and Tubulo-interstitial nephritis.    Past Surgical History:   Procedure Laterality Date    BLADDER SURGERY      BLADDER SURGERY Left 03/17/2022    CYSTOSCOPY LEFT STENT EXCHANGE performed by Bal Mckeon MD at [facility] OR    CHANGE OF BLADDER TUBE,COMPLICATED  5/12/2024    COLON SURGERY      CYSTOSCOPY Left 09/07/2022    LEFT CYSTOSCOPY URETERAL STENT EXCHANGE AND SUPRABUPIC PACE CATHETER EXCHANGE performed by Mirella Wilkins MD at [facility] OR    CYSTOSCOPY Left 10/31/2023    CYSTOSCOPY, LEFT URETERAL STENT EXCHANGE, SUPRAPUBIC TUBE EXCHANGE performed by Mirella Wilkins MD at [facility] OR    CYSTOSCOPY Left 5/13/2024    CYSTOSCOPY URETERAL STENT EXCHANGE performed by Mirella Wilkins MD at [facility] OR    OTHER SURGICAL HISTORY  03/28/2013    Cysto with removal of artificial sphinter and placement of suprapubic catheter.    PROSTATE SURGERY      PROSTATECTOMY  1996    infection - had cancer    UPPER GASTROINTESTINAL ENDOSCOPY N/A 8/8/2024    ESOPHAGOGASTRODUODENOSCOPY DILATION BALLOON 15MM-18MM UP TO 18MM AND BIOPSY performed by Sallie Way MD at [facility] ENDOSCOPY       Family History   Problem

## 2024-11-06 LAB
MICROORGANISM SPEC CULT: NORMAL
SERVICE CMNT-IMP: NORMAL
SPECIMEN DESCRIPTION: NORMAL

## 2024-11-06 NOTE — DISCHARGE INSTRUCTIONS
Contact the nephrologist office to schedule an appointment this week for reevaluation of your kidney function and your potassium discuss your visit to the emergency department.    Follow-up with your primary care provider for reevaluation of your symptoms and discussed the results of your chest x-ray (mild pulmonary edema)    Additionally contact your urologist office to discuss any further treatment for your stents and to discuss the results of your urine culture.    Meanwhile please take the prescription antibiotic as directed unless advised otherwise.    Return with any difficulty breathing, confusion, worsening symptoms or any new concerns.

## 2024-11-11 ENCOUNTER — HOSPITAL ENCOUNTER (OUTPATIENT)
Age: 84
Setting detail: SPECIMEN
Discharge: HOME | End: 2024-11-11
Payer: MEDICARE

## 2024-11-11 LAB
BACTERIA URNS QL MICRO: ABNORMAL
BILIRUB UR QL STRIP: NEGATIVE
CLARITY UR: ABNORMAL
COLOR UR: YELLOW
CRYSTALS URNS MICRO: ABNORMAL /HPF
EPI CELLS #/AREA URNS HPF: 1 /HPF
GLUCOSE UR STRIP-MCNC: NEGATIVE MG/DL
HGB UR QL STRIP.AUTO: ABNORMAL
KETONES UR STRIP-MCNC: NEGATIVE MG/DL
LEUKOCYTE ESTERASE UR QL STRIP: ABNORMAL
MUCOUS THREADS URNS QL MICRO: ABNORMAL
NITRITE UR QL STRIP: NEGATIVE
PH UR STRIP: 8.5 [PH] (ref 5–8)
PROT UR STRIP-MCNC: 100 MG/DL
RBC #/AREA URNS HPF: 26 /HPF (ref 0–2)
SP GR UR STRIP: 1.01 (ref 1–1.03)
TRICHOMONAS #/AREA URNS HPF: ABNORMAL /[HPF]
UROBILINOGEN UR STRIP-ACNC: 0.2 EU/DL (ref 0–1)
WBC #/AREA URNS HPF: 82 /HPF (ref 0–5)

## 2024-11-11 PROCEDURE — 87186 SC STD MICRODIL/AGAR DIL: CPT

## 2024-11-11 PROCEDURE — 87086 URINE CULTURE/COLONY COUNT: CPT

## 2024-11-11 PROCEDURE — 87077 CULTURE AEROBIC IDENTIFY: CPT

## 2024-11-11 PROCEDURE — 81001 URINALYSIS AUTO W/SCOPE: CPT

## 2024-11-13 LAB
MICROORGANISM SPEC CULT: ABNORMAL
SPECIMEN DESCRIPTION: ABNORMAL

## 2024-12-09 ENCOUNTER — ANESTHESIA EVENT (OUTPATIENT)
Dept: OPERATING ROOM | Age: 84
End: 2024-12-09
Payer: MEDICARE

## 2024-12-09 NOTE — PROGRESS NOTES
LM with my call-back # concerning  surgery @ [facility] on 12/10/24.  Please call the PAT Nurse for a phone assessment and surgery instructions. Arrival tomorrow 0830 for 1030 procedure.

## 2024-12-09 NOTE — PROGRESS NOTES
Attempted to call patient for PAT but voicemail box is full. Tried to call sons as well without any luck. Left a voicemail for surgery scheduler at Dr. Wilkins's office with call back number.

## 2024-12-10 ENCOUNTER — HOSPITAL ENCOUNTER (OUTPATIENT)
Age: 84
Setting detail: OUTPATIENT SURGERY
Discharge: HOME | End: 2024-12-10
Attending: SPECIALIST | Admitting: SPECIALIST
Payer: MEDICARE

## 2024-12-10 ENCOUNTER — APPOINTMENT (OUTPATIENT)
Dept: GENERAL RADIOLOGY | Age: 84
End: 2024-12-10
Attending: SPECIALIST
Payer: MEDICARE

## 2024-12-10 ENCOUNTER — ANESTHESIA (OUTPATIENT)
Dept: OPERATING ROOM | Age: 84
End: 2024-12-10
Payer: MEDICARE

## 2024-12-10 VITALS
BODY MASS INDEX: 29.6 KG/M2 | SYSTOLIC BLOOD PRESSURE: 150 MMHG | DIASTOLIC BLOOD PRESSURE: 65 MMHG | WEIGHT: 189 LBS | OXYGEN SATURATION: 98 % | HEART RATE: 75 BPM | TEMPERATURE: 97.6 F | RESPIRATION RATE: 16 BRPM

## 2024-12-10 LAB
ANION GAP SERPL CALCULATED.3IONS-SCNC: 11 MMOL/L (ref 9–17)
BASOPHILS # BLD: 0.04 K/UL
BASOPHILS NFR BLD: 1 % (ref 0–1)
BUN SERPL-MCNC: 41 MG/DL (ref 7–20)
CALCIUM SERPL-MCNC: 9.3 MG/DL (ref 8.3–10.6)
CHLORIDE SERPL-SCNC: 114 MMOL/L (ref 99–110)
CO2 SERPL-SCNC: 19 MMOL/L (ref 21–32)
CREAT SERPL-MCNC: 2.3 MG/DL (ref 0.8–1.3)
EOSINOPHIL # BLD: 0.52 K/UL
EOSINOPHILS RELATIVE PERCENT: 8 % (ref 0–3)
ERYTHROCYTE [DISTWIDTH] IN BLOOD BY AUTOMATED COUNT: 15.5 % (ref 11.7–14.9)
GFR, ESTIMATED: 27 ML/MIN/1.73M2
GLUCOSE BLD-MCNC: 109 MG/DL (ref 74–99)
GLUCOSE BLD-MCNC: 221 MG/DL (ref 74–99)
GLUCOSE SERPL-MCNC: 199 MG/DL (ref 74–99)
HCT VFR BLD AUTO: 26.2 % (ref 42–52)
HGB BLD-MCNC: 8 G/DL (ref 13.5–18)
IMM GRANULOCYTES # BLD AUTO: 0.02 K/UL
IMM GRANULOCYTES NFR BLD: 0 %
LYMPHOCYTES NFR BLD: 1.18 K/UL
LYMPHOCYTES RELATIVE PERCENT: 17 % (ref 24–44)
MCH RBC QN AUTO: 31.1 PG (ref 27–31)
MCHC RBC AUTO-ENTMCNC: 30.5 G/DL (ref 32–36)
MCV RBC AUTO: 101.9 FL (ref 78–100)
MONOCYTES NFR BLD: 0.49 K/UL
MONOCYTES NFR BLD: 7 % (ref 0–4)
NEUTROPHILS NFR BLD: 67 % (ref 36–66)
NEUTS SEG NFR BLD: 4.64 K/UL
PLATELET # BLD AUTO: 187 K/UL (ref 140–440)
PMV BLD AUTO: 11.3 FL (ref 7.5–11.1)
POTASSIUM SERPL-SCNC: 5.6 MMOL/L (ref 3.5–5.1)
RBC # BLD AUTO: 2.57 M/UL (ref 4.6–6.2)
SODIUM SERPL-SCNC: 144 MMOL/L (ref 136–145)
WBC OTHER # BLD: 6.9 K/UL (ref 4–10.5)

## 2024-12-10 PROCEDURE — 6360000002 HC RX W HCPCS: Performed by: NURSE ANESTHETIST, CERTIFIED REGISTERED

## 2024-12-10 PROCEDURE — 7100000010 HC PHASE II RECOVERY - FIRST 15 MIN: Performed by: SPECIALIST

## 2024-12-10 PROCEDURE — 85025 COMPLETE CBC W/AUTO DIFF WBC: CPT

## 2024-12-10 PROCEDURE — 2580000003 HC RX 258: Performed by: ANESTHESIOLOGY

## 2024-12-10 PROCEDURE — 3700000000 HC ANESTHESIA ATTENDED CARE: Performed by: SPECIALIST

## 2024-12-10 PROCEDURE — 76000 FLUOROSCOPY <1 HR PHYS/QHP: CPT

## 2024-12-10 PROCEDURE — 2500000003 HC RX 250 WO HCPCS: Performed by: NURSE ANESTHETIST, CERTIFIED REGISTERED

## 2024-12-10 PROCEDURE — 2580000003 HC RX 258: Performed by: SPECIALIST

## 2024-12-10 PROCEDURE — C1769 GUIDE WIRE: HCPCS | Performed by: SPECIALIST

## 2024-12-10 PROCEDURE — 7100000000 HC PACU RECOVERY - FIRST 15 MIN: Performed by: SPECIALIST

## 2024-12-10 PROCEDURE — 2709999900 HC NON-CHARGEABLE SUPPLY: Performed by: SPECIALIST

## 2024-12-10 PROCEDURE — C2617 STENT, NON-COR, TEM W/O DEL: HCPCS | Performed by: SPECIALIST

## 2024-12-10 PROCEDURE — 3600000003 HC SURGERY LEVEL 3 BASE: Performed by: SPECIALIST

## 2024-12-10 PROCEDURE — 7100000011 HC PHASE II RECOVERY - ADDTL 15 MIN: Performed by: SPECIALIST

## 2024-12-10 PROCEDURE — 6360000002 HC RX W HCPCS: Performed by: ANESTHESIOLOGY

## 2024-12-10 PROCEDURE — 6370000000 HC RX 637 (ALT 250 FOR IP): Performed by: ANESTHESIOLOGY

## 2024-12-10 PROCEDURE — 80048 BASIC METABOLIC PNL TOTAL CA: CPT

## 2024-12-10 PROCEDURE — C1758 CATHETER, URETERAL: HCPCS | Performed by: SPECIALIST

## 2024-12-10 PROCEDURE — 6360000002 HC RX W HCPCS: Performed by: SPECIALIST

## 2024-12-10 PROCEDURE — 7100000001 HC PACU RECOVERY - ADDTL 15 MIN: Performed by: SPECIALIST

## 2024-12-10 PROCEDURE — 3700000001 HC ADD 15 MINUTES (ANESTHESIA): Performed by: SPECIALIST

## 2024-12-10 PROCEDURE — 3600000013 HC SURGERY LEVEL 3 ADDTL 15MIN: Performed by: SPECIALIST

## 2024-12-10 PROCEDURE — 82962 GLUCOSE BLOOD TEST: CPT

## 2024-12-10 DEVICE — VARIABLE LENGTH URETERAL STENT
Type: IMPLANTABLE DEVICE | Site: URETER | Status: FUNCTIONAL
Brand: CONTOUR VL™

## 2024-12-10 RX ORDER — LIDOCAINE HYDROCHLORIDE 20 MG/ML
INJECTION, SOLUTION INTRAVENOUS
Status: DISCONTINUED | OUTPATIENT
Start: 2024-12-10 | End: 2024-12-10 | Stop reason: SDUPTHER

## 2024-12-10 RX ORDER — OXYCODONE HYDROCHLORIDE 5 MG/1
10 TABLET ORAL PRN
Status: DISCONTINUED | OUTPATIENT
Start: 2024-12-10 | End: 2024-12-10 | Stop reason: HOSPADM

## 2024-12-10 RX ORDER — PROPOFOL 10 MG/ML
INJECTION, EMULSION INTRAVENOUS
Status: DISCONTINUED | OUTPATIENT
Start: 2024-12-10 | End: 2024-12-10 | Stop reason: SDUPTHER

## 2024-12-10 RX ORDER — ONDANSETRON 2 MG/ML
4 INJECTION INTRAMUSCULAR; INTRAVENOUS
Status: DISCONTINUED | OUTPATIENT
Start: 2024-12-10 | End: 2024-12-10 | Stop reason: HOSPADM

## 2024-12-10 RX ORDER — SODIUM CHLORIDE 9 MG/ML
INJECTION, SOLUTION INTRAVENOUS PRN
Status: DISCONTINUED | OUTPATIENT
Start: 2024-12-10 | End: 2024-12-10 | Stop reason: HOSPADM

## 2024-12-10 RX ORDER — ROCURONIUM BROMIDE 10 MG/ML
INJECTION, SOLUTION INTRAVENOUS
Status: DISCONTINUED | OUTPATIENT
Start: 2024-12-10 | End: 2024-12-10 | Stop reason: SDUPTHER

## 2024-12-10 RX ORDER — ACETAMINOPHEN 325 MG/1
650 TABLET ORAL ONCE
Status: DISCONTINUED | OUTPATIENT
Start: 2024-12-10 | End: 2024-12-10 | Stop reason: HOSPADM

## 2024-12-10 RX ORDER — ONDANSETRON 2 MG/ML
INJECTION INTRAMUSCULAR; INTRAVENOUS
Status: DISCONTINUED | OUTPATIENT
Start: 2024-12-10 | End: 2024-12-10 | Stop reason: SDUPTHER

## 2024-12-10 RX ORDER — METOCLOPRAMIDE HYDROCHLORIDE 5 MG/ML
10 INJECTION INTRAMUSCULAR; INTRAVENOUS
Status: DISCONTINUED | OUTPATIENT
Start: 2024-12-10 | End: 2024-12-10 | Stop reason: HOSPADM

## 2024-12-10 RX ORDER — ACETAMINOPHEN 325 MG/1
650 TABLET ORAL
Status: DISCONTINUED | OUTPATIENT
Start: 2024-12-10 | End: 2024-12-10 | Stop reason: HOSPADM

## 2024-12-10 RX ORDER — SODIUM CHLORIDE 0.9 % (FLUSH) 0.9 %
5-40 SYRINGE (ML) INJECTION EVERY 12 HOURS SCHEDULED
Status: DISCONTINUED | OUTPATIENT
Start: 2024-12-10 | End: 2024-12-10 | Stop reason: HOSPADM

## 2024-12-10 RX ORDER — SODIUM CHLORIDE, SODIUM LACTATE, POTASSIUM CHLORIDE, CALCIUM CHLORIDE 600; 310; 30; 20 MG/100ML; MG/100ML; MG/100ML; MG/100ML
INJECTION, SOLUTION INTRAVENOUS CONTINUOUS
Status: DISCONTINUED | OUTPATIENT
Start: 2024-12-10 | End: 2024-12-10 | Stop reason: HOSPADM

## 2024-12-10 RX ORDER — SODIUM CHLORIDE 0.9 % (FLUSH) 0.9 %
5-40 SYRINGE (ML) INJECTION PRN
Status: DISCONTINUED | OUTPATIENT
Start: 2024-12-10 | End: 2024-12-10 | Stop reason: HOSPADM

## 2024-12-10 RX ORDER — PROCHLORPERAZINE EDISYLATE 5 MG/ML
5 INJECTION INTRAMUSCULAR; INTRAVENOUS
Status: DISCONTINUED | OUTPATIENT
Start: 2024-12-10 | End: 2024-12-10 | Stop reason: HOSPADM

## 2024-12-10 RX ORDER — DIPHENHYDRAMINE HYDROCHLORIDE 50 MG/ML
12.5 INJECTION INTRAMUSCULAR; INTRAVENOUS
Status: DISCONTINUED | OUTPATIENT
Start: 2024-12-10 | End: 2024-12-10 | Stop reason: HOSPADM

## 2024-12-10 RX ORDER — OXYCODONE HYDROCHLORIDE 5 MG/1
5 TABLET ORAL PRN
Status: DISCONTINUED | OUTPATIENT
Start: 2024-12-10 | End: 2024-12-10 | Stop reason: HOSPADM

## 2024-12-10 RX ORDER — NALOXONE HYDROCHLORIDE 0.4 MG/ML
INJECTION, SOLUTION INTRAMUSCULAR; INTRAVENOUS; SUBCUTANEOUS PRN
Status: DISCONTINUED | OUTPATIENT
Start: 2024-12-10 | End: 2024-12-10 | Stop reason: HOSPADM

## 2024-12-10 RX ORDER — FENTANYL CITRATE 50 UG/ML
25 INJECTION, SOLUTION INTRAMUSCULAR; INTRAVENOUS EVERY 5 MIN PRN
Status: DISCONTINUED | OUTPATIENT
Start: 2024-12-10 | End: 2024-12-10 | Stop reason: HOSPADM

## 2024-12-10 RX ORDER — IPRATROPIUM BROMIDE AND ALBUTEROL SULFATE 2.5; .5 MG/3ML; MG/3ML
1 SOLUTION RESPIRATORY (INHALATION)
Status: DISCONTINUED | OUTPATIENT
Start: 2024-12-10 | End: 2024-12-10 | Stop reason: HOSPADM

## 2024-12-10 RX ORDER — LABETALOL HYDROCHLORIDE 5 MG/ML
10 INJECTION, SOLUTION INTRAVENOUS
Status: DISCONTINUED | OUTPATIENT
Start: 2024-12-10 | End: 2024-12-10 | Stop reason: HOSPADM

## 2024-12-10 RX ADMIN — PROPOFOL 90 MG: 10 INJECTION, EMULSION INTRAVENOUS at 11:23

## 2024-12-10 RX ADMIN — ONDANSETRON 4 MG: 2 INJECTION INTRAMUSCULAR; INTRAVENOUS at 11:49

## 2024-12-10 RX ADMIN — SUGAMMADEX 200 MG: 100 INJECTION, SOLUTION INTRAVENOUS at 11:52

## 2024-12-10 RX ADMIN — SODIUM CHLORIDE, POTASSIUM CHLORIDE, SODIUM LACTATE AND CALCIUM CHLORIDE: 600; 310; 30; 20 INJECTION, SOLUTION INTRAVENOUS at 10:24

## 2024-12-10 RX ADMIN — ROCURONIUM BROMIDE 30 MG: 10 INJECTION, SOLUTION INTRAVENOUS at 11:23

## 2024-12-10 RX ADMIN — CEFAZOLIN 2000 MG: 2 INJECTION, POWDER, FOR SOLUTION INTRAMUSCULAR; INTRAVENOUS at 11:29

## 2024-12-10 RX ADMIN — INSULIN HUMAN 2 UNITS: 100 INJECTION, SOLUTION PARENTERAL at 10:41

## 2024-12-10 RX ADMIN — HYDROMORPHONE HYDROCHLORIDE 0.5 MG: 1 INJECTION, SOLUTION INTRAMUSCULAR; INTRAVENOUS; SUBCUTANEOUS at 12:11

## 2024-12-10 RX ADMIN — LIDOCAINE HYDROCHLORIDE 100 MG: 20 INJECTION, SOLUTION INTRAVENOUS at 11:23

## 2024-12-10 ASSESSMENT — PAIN DESCRIPTION - PAIN TYPE
TYPE: CHRONIC PAIN
TYPE: CHRONIC PAIN

## 2024-12-10 ASSESSMENT — PAIN SCALES - GENERAL
PAINLEVEL_OUTOF10: 5
PAINLEVEL_OUTOF10: 10

## 2024-12-10 ASSESSMENT — PAIN DESCRIPTION - ONSET
ONSET: ON-GOING
ONSET: ON-GOING

## 2024-12-10 ASSESSMENT — PAIN - FUNCTIONAL ASSESSMENT
PAIN_FUNCTIONAL_ASSESSMENT: 0-10
PAIN_FUNCTIONAL_ASSESSMENT: 0-10
PAIN_FUNCTIONAL_ASSESSMENT: PREVENTS OR INTERFERES SOME ACTIVE ACTIVITIES AND ADLS
PAIN_FUNCTIONAL_ASSESSMENT: PREVENTS OR INTERFERES SOME ACTIVE ACTIVITIES AND ADLS

## 2024-12-10 ASSESSMENT — PAIN DESCRIPTION - DESCRIPTORS
DESCRIPTORS: ACHING;BURNING
DESCRIPTORS: ACHING;BURNING

## 2024-12-10 ASSESSMENT — PAIN DESCRIPTION - FREQUENCY
FREQUENCY: CONTINUOUS
FREQUENCY: CONTINUOUS

## 2024-12-10 ASSESSMENT — PAIN DESCRIPTION - ORIENTATION
ORIENTATION: LEFT
ORIENTATION: LEFT

## 2024-12-10 ASSESSMENT — PAIN SCALES - WONG BAKER: WONGBAKER_NUMERICALRESPONSE: NO HURT

## 2024-12-10 ASSESSMENT — PAIN DESCRIPTION - LOCATION
LOCATION: FOOT
LOCATION: FOOT

## 2024-12-10 ASSESSMENT — LIFESTYLE VARIABLES: SMOKING_STATUS: 0

## 2024-12-10 NOTE — ANESTHESIA PRE PROCEDURE
Department of Anesthesiology  Preprocedure Note       Name:  Greg Easton   Age:  84 y.o.  :  1940                                          MRN:  [id number]         Date:  12/10/2024      Surgeon: Surgeon(s):  Mirella Wilkins MD    Procedure: Procedure(s):  LEFT CYSTOSCOPY URETERAL STENT EXCHANGE    Medications prior to admission:   Prior to Admission medications    Medication Sig Start Date End Date Taking? Authorizing Provider   gabapentin (NEURONTIN) 100 MG capsule Take 1 capsule by mouth 2 times daily for 30 days. 8/9/24 12/10/24 Yes Kristopher Peralta MD   hydrALAZINE (APRESOLINE) 100 MG tablet Take 1 tablet by mouth every 8 hours 24  Yes GLEN Hope MD   ferrous sulfate (IRON 325) 325 (65 Fe) MG tablet Take 1 tablet by mouth daily (with breakfast) 24  Yes Lc De Paz MD   atorvastatin (LIPITOR) 20 MG tablet Take 1 tablet by mouth nightly 23  Yes GLEN Hope MD   amLODIPine (NORVASC) 10 MG tablet Take 1 tablet by mouth daily 23  Yes Giovany Zamarripa MD   omeprazole (PRILOSEC) 40 MG delayed release capsule Take 1 capsule by mouth daily 24  Kristopher Peralta MD   insulin glargine (LANTUS SOLOSTAR) 100 UNIT/ML injection pen Inject 5 Units into the skin nightly 24   GLEN Hope MD   Blood Pressure KIT 1 kit by Does not apply route once for 1 dose 24  Brett Coto MD   albuterol sulfate HFA (VENTOLIN HFA) 108 (90 Base) MCG/ACT inhaler Inhale 1 puff into the lungs 4 times daily Indications: shortness of breath    ProviderCelso MD   senna (SENOKOT) 8.6 MG tablet Take 2 tablets by mouth daily    Celso Espinoza MD   levothyroxine (SYNTHROID) 75 MCG tablet Take 1 tablet by mouth Daily    Celso Espinoza MD   aspirin 81 MG chewable tablet Take 1 tablet by mouth daily  Patient taking differently: Take 1 tablet by mouth nightly 22   Valentino Lopez MD       Current medications:    Current 
at [facility] ENDOSCOPY       Social History:    Social History     Tobacco Use   • Smoking status: Former     Current packs/day: 0.00     Types: Cigarettes     Quit date: 1976     Years since quittin.4   • Smokeless tobacco: Never   Substance Use Topics   • Alcohol use: No     Comment: Quit in                                 Counseling given: Not Answered      Vital Signs (Current):   There were no vitals filed for this visit.                                             BP Readings from Last 3 Encounters:   24 (!) 160/73   24 (!) 142/76   24 127/66       NPO Status:                                                                                 BMI:   Wt Readings from Last 3 Encounters:   24 75.3 kg (166 lb)   24 75.3 kg (166 lb 0.1 oz)   24 76.7 kg (169 lb)     There is no height or weight on file to calculate BMI.    CBC:   Lab Results   Component Value Date/Time    WBC 7.5 2024 06:05 PM    RBC 2.58 2024 06:05 PM    HGB 7.9 2024 06:05 PM    HCT 27.3 2024 06:05 PM    .8 2024 06:05 PM    RDW 17.5 2024 06:05 PM     2024 06:05 PM       CMP:   Lab Results   Component Value Date/Time     2024 04:21 PM    K 5.5 2024 04:21 PM    K 3.9 2018 04:42 AM     2024 04:21 PM    CO2 22 2024 04:21 PM    BUN 46 2024 04:21 PM    CREATININE 2.3 2024 04:21 PM    GFRAA 31 10/13/2022 06:30 AM    AGRATIO 1.0 06/10/2024 01:58 PM    LABGLOM 27 2024 04:21 PM    LABGLOM 25 2024 09:46 AM    LABGLOM 30 2023 10:12 AM    GLUCOSE 171 2024 04:21 PM    CALCIUM 8.6 2024 04:21 PM    BILITOT <0.2 2024 04:21 PM    ALKPHOS 95 2024 04:21 PM    AST 30 2024 04:21 PM    ALT 19 2024 04:21 PM       POC Tests:   No results for input(s): \"POCGLU\", \"POCNA\", \"POCK\", \"POCCL\", \"POCBUN\", \"POCHEMO\", \"POCHCT\" in the last 72 hours.    Coags:   Lab Results

## 2024-12-10 NOTE — PROGRESS NOTES
Pt returned to room from   PACU  Report received from Abida Sandvoal A&O, call light placed within reach, side rails up x's 2

## 2024-12-10 NOTE — BRIEF OP NOTE
Brief Postoperative Note      Patient: Greg Easton  YOB: 1940  MRN: [id number]    Date of Procedure: 12/10/2024    Pre-Op Diagnosis Codes:      * Other hydronephrosis [N13.39]    Post-Op Diagnosis: Same       Procedure(s):  LEFT CYSTOSCOPY URETERAL STENT EXCHANGE    Surgeon(s):  Mirella Wilkins MD    Assistant:  * No surgical staff found *    Anesthesia: General    Estimated Blood Loss (mL): Minimal    Complications: None    Specimens:   * No specimens in log *    Implants:  Implant Name Type Inv. Item Serial No.  Lot No. LRB No. Used Action   STENT URET 4.8FR [id number] PERCFLX HYDR+ SFT PGTL TAPR TIP - [id number]  STENT URET 4.8FR [id number] PERCFLX HYDR+ SFT PGTL TAPR TIP  [employer] [id number] Left 1 Implanted         Drains: * No LDAs found *    Findings:  Infection Present At Time Of Surgery (PATOS) (choose all levels that have infection present):  No infection present  Other Findings: uneventful left stent excfhange    Electronically signed by Mirella Wilkins MD on 12/10/2024 at 11:52 AM

## 2024-12-10 NOTE — PROGRESS NOTES
1200: Patient arrived in PACU from the OR s/p left cysto with stent exchange. Received report from Denisse QUIÑONES and Tano QUIROGA. Patient attached to monitor and all alarms are on.   1205: Patient awake, sleepy from anesthesia, patient following commands. Denies pain related to procedure but is having pain in the left foot where he has a diabetic pressure ulcer.   1211: Patient c/p pain se mar. Patient denies nausea.   1217: POC glucose 109.  1235: Patient awake, sleepy from anesthesia, patient following commands. 100% on room air. VSS.   1240: RN emptied 300 mls of urine from urine bag.   1243: RN transported patient to [facility], bedside handoff report given to Neeru QUIÑONES. VSS. Call light in reach.

## 2024-12-10 NOTE — ANESTHESIA POSTPROCEDURE EVALUATION
Department of Anesthesiology  Postprocedure Note    Patient: Greg Easton  MRN: [id number]  YOB: 1940  Date of evaluation: 12/10/2024    Procedure Summary       Date: 12/10/24 Room / Location: [address]    Anesthesia Start: 1119 Anesthesia Stop: 1201    Procedure: LEFT CYSTOSCOPY URETERAL STENT EXCHANGE (Left: Ureter) Diagnosis:       Other hydronephrosis      (Other hydronephrosis [N13.39])    Surgeons: Mirella Wilkins MD Responsible Provider: Alok Isaac MD    Anesthesia Type: MAC, general ASA Status: 3            Anesthesia Type: No value filed.    Jackeline Phase I: Jackeline Score: 10    Jackeline Phase II:      Anesthesia Post Evaluation    Patient location during evaluation: PACU  Patient participation: complete - patient participated  Level of consciousness: awake and alert  Pain score: 0  Airway patency: patent  Nausea & Vomiting: no nausea and no vomiting  Cardiovascular status: blood pressure returned to baseline  Respiratory status: acceptable, nasal cannula, spontaneous ventilation and nonlabored ventilation  Pain management: adequate    No notable events documented.

## 2024-12-10 NOTE — DISCHARGE INSTRUCTIONS
Follow up in 1 month.      [facility]  [phone]    Do not drive, work around machines or use equipment.  Do not drink any alcoholic beverages.  Do not smoke while alone.  Avoid making important decisions.  Plan to spend a quiet, relaxed evening @ home.  Resume normal activities as you begin to feel better.  Eat lightly for your first meal, then gradually increase your diet to what is normal for you.  In case of nausea, avoid food and drink only clear liquids.  Resume food as nausea ceases.  Notify your surgeon if you experience fever, chills, large amount of bleeding, difficulty breathing, persistent nausea and vomiting or any other disturbing problem.  Call for a follow-up appointment with your surgeon.

## 2024-12-13 ENCOUNTER — HOSPITAL ENCOUNTER (OUTPATIENT)
Age: 84
Setting detail: SPECIMEN
Discharge: HOME | End: 2024-12-13
Payer: MEDICARE

## 2024-12-13 LAB
ANION GAP SERPL CALCULATED.3IONS-SCNC: 12 MMOL/L (ref 9–17)
BASOPHILS # BLD: 0.03 K/UL
BASOPHILS NFR BLD: 0 % (ref 0–1)
BUN SERPL-MCNC: 44 MG/DL (ref 7–20)
CALCIUM SERPL-MCNC: 9.3 MG/DL (ref 8.3–10.6)
CHLORIDE SERPL-SCNC: 115 MMOL/L (ref 99–110)
CO2 SERPL-SCNC: 19 MMOL/L (ref 21–32)
CREAT SERPL-MCNC: 2.4 MG/DL (ref 0.8–1.3)
EOSINOPHIL # BLD: 0.59 K/UL
EOSINOPHILS RELATIVE PERCENT: 9 % (ref 0–3)
ERYTHROCYTE [DISTWIDTH] IN BLOOD BY AUTOMATED COUNT: 15.6 % (ref 11.7–14.9)
ERYTHROCYTE [SEDIMENTATION RATE] IN BLOOD BY WESTERGREN METHOD: 113 MM/HR (ref 0–20)
GFR, ESTIMATED: 26 ML/MIN/1.73M2
GLUCOSE SERPL-MCNC: 252 MG/DL (ref 74–99)
HCT VFR BLD AUTO: 25.9 % (ref 42–52)
HGB BLD-MCNC: 7.9 G/DL (ref 13.5–18)
IMM GRANULOCYTES # BLD AUTO: 0.02 K/UL
IMM GRANULOCYTES NFR BLD: 0 %
LYMPHOCYTES NFR BLD: 1.16 K/UL
LYMPHOCYTES RELATIVE PERCENT: 17 % (ref 24–44)
MCH RBC QN AUTO: 30.9 PG (ref 27–31)
MCHC RBC AUTO-ENTMCNC: 30.5 G/DL (ref 32–36)
MCV RBC AUTO: 101.2 FL (ref 78–100)
MONOCYTES NFR BLD: 0.45 K/UL
MONOCYTES NFR BLD: 7 % (ref 0–4)
NEUTROPHILS NFR BLD: 67 % (ref 36–66)
NEUTS SEG NFR BLD: 4.47 K/UL
PLATELET # BLD AUTO: 189 K/UL (ref 140–440)
PMV BLD AUTO: 12.1 FL (ref 7.5–11.1)
POTASSIUM SERPL-SCNC: 5 MMOL/L (ref 3.5–5.1)
RBC # BLD AUTO: 2.56 M/UL (ref 4.6–6.2)
SODIUM SERPL-SCNC: 145 MMOL/L (ref 136–145)
WBC OTHER # BLD: 6.7 K/UL (ref 4–10.5)

## 2024-12-13 PROCEDURE — 85652 RBC SED RATE AUTOMATED: CPT

## 2024-12-13 PROCEDURE — 85025 COMPLETE CBC W/AUTO DIFF WBC: CPT

## 2024-12-13 PROCEDURE — 80048 BASIC METABOLIC PNL TOTAL CA: CPT

## 2024-12-17 ENCOUNTER — HOSPITAL ENCOUNTER (INPATIENT)
Age: 84
LOS: 7 days | Discharge: NURSING, REHAB OR LONG-TERM CARE | DRG: 622 | End: 2024-12-24
Attending: STUDENT IN AN ORGANIZED HEALTH CARE EDUCATION/TRAINING PROGRAM | Admitting: STUDENT IN AN ORGANIZED HEALTH CARE EDUCATION/TRAINING PROGRAM
Payer: MEDICARE

## 2024-12-17 DIAGNOSIS — L89.90 PRESSURE INJURY OF SKIN, UNSPECIFIED INJURY STAGE, UNSPECIFIED LOCATION: ICD-10-CM

## 2024-12-17 DIAGNOSIS — L97.422 DIABETIC ULCER OF LEFT HEEL ASSOCIATED WITH DIABETES MELLITUS DUE TO UNDERLYING CONDITION, WITH FAT LAYER EXPOSED (HCC): Primary | ICD-10-CM

## 2024-12-17 DIAGNOSIS — S91.332A PENETRATING FOOT WOUND, LEFT, INITIAL ENCOUNTER: ICD-10-CM

## 2024-12-17 DIAGNOSIS — E08.621 DIABETIC ULCER OF LEFT HEEL ASSOCIATED WITH DIABETES MELLITUS DUE TO UNDERLYING CONDITION, WITH FAT LAYER EXPOSED (HCC): Primary | ICD-10-CM

## 2024-12-17 LAB
ANION GAP SERPL CALCULATED.3IONS-SCNC: 12 MMOL/L (ref 9–17)
BILIRUB UR QL STRIP: NEGATIVE
BUN SERPL-MCNC: 34 MG/DL (ref 7–20)
CALCIUM SERPL-MCNC: 8.9 MG/DL (ref 8.3–10.6)
CHLORIDE SERPL-SCNC: 111 MMOL/L (ref 99–110)
CLARITY UR: CLEAR
CO2 SERPL-SCNC: 16 MMOL/L (ref 21–32)
COLOR UR: YELLOW
CREAT SERPL-MCNC: 2.1 MG/DL (ref 0.8–1.3)
ERYTHROCYTE [DISTWIDTH] IN BLOOD BY AUTOMATED COUNT: 15.2 % (ref 11.7–14.9)
GFR, ESTIMATED: 30 ML/MIN/1.73M2
GLUCOSE BLD-MCNC: 147 MG/DL (ref 74–99)
GLUCOSE BLD-MCNC: 306 MG/DL (ref 74–99)
GLUCOSE SERPL-MCNC: 270 MG/DL (ref 74–99)
GLUCOSE UR STRIP-MCNC: 100 MG/DL
HCT VFR BLD AUTO: 31.8 % (ref 42–52)
HGB BLD-MCNC: 8.7 G/DL (ref 13.5–18)
HGB UR QL STRIP.AUTO: ABNORMAL
KETONES UR STRIP-MCNC: NEGATIVE MG/DL
LACTATE BLDV-SCNC: 1.4 MMOL/L (ref 0.4–2)
LACTATE BLDV-SCNC: 1.4 MMOL/L (ref 0.4–2)
LEUKOCYTE ESTERASE UR QL STRIP: ABNORMAL
MAGNESIUM SERPL-MCNC: 1.6 MG/DL (ref 1.8–2.4)
MCH RBC QN AUTO: 31 PG (ref 27–31)
MCHC RBC AUTO-ENTMCNC: 27.4 G/DL (ref 32–36)
MCV RBC AUTO: 113.2 FL (ref 78–100)
NITRITE UR QL STRIP: NEGATIVE
PH UR STRIP: 6 [PH] (ref 5–8)
PHOSPHATE SERPL-MCNC: 2.8 MG/DL (ref 2.5–4.9)
PLATELET # BLD AUTO: 166 K/UL (ref 140–440)
PMV BLD AUTO: 10.7 FL (ref 7.5–11.1)
POTASSIUM SERPL-SCNC: 4.5 MMOL/L (ref 3.5–5.1)
PROT UR STRIP-MCNC: 100 MG/DL
RBC # BLD AUTO: 2.81 M/UL (ref 4.6–6.2)
SODIUM SERPL-SCNC: 138 MMOL/L (ref 136–145)
SP GR UR STRIP: 1.01 (ref 1–1.03)
UROBILINOGEN UR STRIP-ACNC: 0.2 EU/DL (ref 0–1)
WBC OTHER # BLD: 6 K/UL (ref 4–10.5)

## 2024-12-17 PROCEDURE — 36415 COLL VENOUS BLD VENIPUNCTURE: CPT

## 2024-12-17 PROCEDURE — 2580000003 HC RX 258: Performed by: STUDENT IN AN ORGANIZED HEALTH CARE EDUCATION/TRAINING PROGRAM

## 2024-12-17 PROCEDURE — 83735 ASSAY OF MAGNESIUM: CPT

## 2024-12-17 PROCEDURE — 81001 URINALYSIS AUTO W/SCOPE: CPT

## 2024-12-17 PROCEDURE — 87086 URINE CULTURE/COLONY COUNT: CPT

## 2024-12-17 PROCEDURE — 83605 ASSAY OF LACTIC ACID: CPT

## 2024-12-17 PROCEDURE — 87186 SC STD MICRODIL/AGAR DIL: CPT

## 2024-12-17 PROCEDURE — 1200000000 HC SEMI PRIVATE

## 2024-12-17 PROCEDURE — 85027 COMPLETE CBC AUTOMATED: CPT

## 2024-12-17 PROCEDURE — 6360000002 HC RX W HCPCS: Performed by: FAMILY MEDICINE

## 2024-12-17 PROCEDURE — 6360000002 HC RX W HCPCS: Performed by: STUDENT IN AN ORGANIZED HEALTH CARE EDUCATION/TRAINING PROGRAM

## 2024-12-17 PROCEDURE — 6370000000 HC RX 637 (ALT 250 FOR IP): Performed by: STUDENT IN AN ORGANIZED HEALTH CARE EDUCATION/TRAINING PROGRAM

## 2024-12-17 PROCEDURE — 82962 GLUCOSE BLOOD TEST: CPT

## 2024-12-17 PROCEDURE — 94761 N-INVAS EAR/PLS OXIMETRY MLT: CPT

## 2024-12-17 PROCEDURE — 84100 ASSAY OF PHOSPHORUS: CPT

## 2024-12-17 PROCEDURE — 87077 CULTURE AEROBIC IDENTIFY: CPT

## 2024-12-17 PROCEDURE — 87040 BLOOD CULTURE FOR BACTERIA: CPT

## 2024-12-17 PROCEDURE — 80048 BASIC METABOLIC PNL TOTAL CA: CPT

## 2024-12-17 PROCEDURE — 2500000003 HC RX 250 WO HCPCS: Performed by: STUDENT IN AN ORGANIZED HEALTH CARE EDUCATION/TRAINING PROGRAM

## 2024-12-17 RX ORDER — INSULIN GLARGINE 100 [IU]/ML
10 INJECTION, SOLUTION SUBCUTANEOUS NIGHTLY
Status: DISCONTINUED | OUTPATIENT
Start: 2024-12-17 | End: 2024-12-19

## 2024-12-17 RX ORDER — ACETAMINOPHEN 650 MG/1
650 SUPPOSITORY RECTAL EVERY 6 HOURS PRN
Status: DISCONTINUED | OUTPATIENT
Start: 2024-12-17 | End: 2024-12-24 | Stop reason: HOSPADM

## 2024-12-17 RX ORDER — SODIUM CHLORIDE 0.9 % (FLUSH) 0.9 %
5-40 SYRINGE (ML) INJECTION EVERY 12 HOURS SCHEDULED
Status: DISCONTINUED | OUTPATIENT
Start: 2024-12-17 | End: 2024-12-24 | Stop reason: HOSPADM

## 2024-12-17 RX ORDER — AMLODIPINE BESYLATE 10 MG/1
10 TABLET ORAL DAILY
Status: DISCONTINUED | OUTPATIENT
Start: 2024-12-17 | End: 2024-12-24 | Stop reason: HOSPADM

## 2024-12-17 RX ORDER — ASPIRIN 81 MG/1
81 TABLET, CHEWABLE ORAL DAILY
Status: DISCONTINUED | OUTPATIENT
Start: 2024-12-18 | End: 2024-12-24 | Stop reason: HOSPADM

## 2024-12-17 RX ORDER — LEVOTHYROXINE SODIUM 75 UG/1
75 TABLET ORAL DAILY
Status: DISCONTINUED | OUTPATIENT
Start: 2024-12-18 | End: 2024-12-24 | Stop reason: HOSPADM

## 2024-12-17 RX ORDER — POLYETHYLENE GLYCOL 3350 17 G/17G
17 POWDER, FOR SOLUTION ORAL DAILY PRN
Status: DISCONTINUED | OUTPATIENT
Start: 2024-12-17 | End: 2024-12-24 | Stop reason: HOSPADM

## 2024-12-17 RX ORDER — ONDANSETRON 4 MG/1
4 TABLET, ORALLY DISINTEGRATING ORAL EVERY 8 HOURS PRN
Status: DISCONTINUED | OUTPATIENT
Start: 2024-12-17 | End: 2024-12-24 | Stop reason: HOSPADM

## 2024-12-17 RX ORDER — GLUCAGON 1 MG/ML
1 KIT INJECTION PRN
Status: DISCONTINUED | OUTPATIENT
Start: 2024-12-17 | End: 2024-12-24 | Stop reason: HOSPADM

## 2024-12-17 RX ORDER — MAGNESIUM SULFATE 4 G/50ML
4000 INJECTION INTRAVENOUS ONCE
Status: COMPLETED | OUTPATIENT
Start: 2024-12-17 | End: 2024-12-17

## 2024-12-17 RX ORDER — PANTOPRAZOLE SODIUM 40 MG/1
40 TABLET, DELAYED RELEASE ORAL
Status: DISCONTINUED | OUTPATIENT
Start: 2024-12-18 | End: 2024-12-24 | Stop reason: HOSPADM

## 2024-12-17 RX ORDER — POTASSIUM CHLORIDE 1500 MG/1
40 TABLET, EXTENDED RELEASE ORAL PRN
Status: DISCONTINUED | OUTPATIENT
Start: 2024-12-17 | End: 2024-12-17

## 2024-12-17 RX ORDER — ENOXAPARIN SODIUM 100 MG/ML
40 INJECTION SUBCUTANEOUS DAILY
Status: DISCONTINUED | OUTPATIENT
Start: 2024-12-17 | End: 2024-12-17 | Stop reason: DRUGHIGH

## 2024-12-17 RX ORDER — DEXTROSE MONOHYDRATE 100 MG/ML
INJECTION, SOLUTION INTRAVENOUS CONTINUOUS PRN
Status: DISCONTINUED | OUTPATIENT
Start: 2024-12-17 | End: 2024-12-24 | Stop reason: HOSPADM

## 2024-12-17 RX ORDER — MAGNESIUM SULFATE IN WATER 40 MG/ML
2000 INJECTION, SOLUTION INTRAVENOUS PRN
Status: DISCONTINUED | OUTPATIENT
Start: 2024-12-17 | End: 2024-12-17

## 2024-12-17 RX ORDER — GABAPENTIN 100 MG/1
100 CAPSULE ORAL 2 TIMES DAILY
Status: DISCONTINUED | OUTPATIENT
Start: 2024-12-17 | End: 2024-12-24 | Stop reason: HOSPADM

## 2024-12-17 RX ORDER — POTASSIUM CHLORIDE 7.45 MG/ML
10 INJECTION INTRAVENOUS PRN
Status: DISCONTINUED | OUTPATIENT
Start: 2024-12-17 | End: 2024-12-17

## 2024-12-17 RX ORDER — SODIUM CHLORIDE 0.9 % (FLUSH) 0.9 %
5-40 SYRINGE (ML) INJECTION PRN
Status: DISCONTINUED | OUTPATIENT
Start: 2024-12-17 | End: 2024-12-24 | Stop reason: HOSPADM

## 2024-12-17 RX ORDER — ONDANSETRON 2 MG/ML
4 INJECTION INTRAMUSCULAR; INTRAVENOUS EVERY 6 HOURS PRN
Status: DISCONTINUED | OUTPATIENT
Start: 2024-12-17 | End: 2024-12-24 | Stop reason: HOSPADM

## 2024-12-17 RX ORDER — SENNOSIDES A AND B 8.6 MG/1
2 TABLET, FILM COATED ORAL DAILY
Status: DISCONTINUED | OUTPATIENT
Start: 2024-12-17 | End: 2024-12-24 | Stop reason: HOSPADM

## 2024-12-17 RX ORDER — INSULIN LISPRO 100 [IU]/ML
0-4 INJECTION, SOLUTION INTRAVENOUS; SUBCUTANEOUS
Status: DISCONTINUED | OUTPATIENT
Start: 2024-12-17 | End: 2024-12-24 | Stop reason: HOSPADM

## 2024-12-17 RX ORDER — ACETAMINOPHEN 325 MG/1
650 TABLET ORAL EVERY 6 HOURS PRN
Status: DISCONTINUED | OUTPATIENT
Start: 2024-12-17 | End: 2024-12-24 | Stop reason: HOSPADM

## 2024-12-17 RX ORDER — HYDRALAZINE HYDROCHLORIDE 50 MG/1
100 TABLET, FILM COATED ORAL EVERY 8 HOURS SCHEDULED
Status: DISCONTINUED | OUTPATIENT
Start: 2024-12-17 | End: 2024-12-24 | Stop reason: HOSPADM

## 2024-12-17 RX ORDER — SODIUM CHLORIDE, SODIUM LACTATE, POTASSIUM CHLORIDE, CALCIUM CHLORIDE 600; 310; 30; 20 MG/100ML; MG/100ML; MG/100ML; MG/100ML
INJECTION, SOLUTION INTRAVENOUS CONTINUOUS
Status: DISCONTINUED | OUTPATIENT
Start: 2024-12-17 | End: 2024-12-18

## 2024-12-17 RX ORDER — ATORVASTATIN CALCIUM 10 MG/1
20 TABLET, FILM COATED ORAL NIGHTLY
Status: DISCONTINUED | OUTPATIENT
Start: 2024-12-17 | End: 2024-12-24 | Stop reason: HOSPADM

## 2024-12-17 RX ORDER — ENOXAPARIN SODIUM 100 MG/ML
30 INJECTION SUBCUTANEOUS DAILY
Status: DISCONTINUED | OUTPATIENT
Start: 2024-12-17 | End: 2024-12-24 | Stop reason: HOSPADM

## 2024-12-17 RX ORDER — SODIUM CHLORIDE 9 MG/ML
INJECTION, SOLUTION INTRAVENOUS PRN
Status: DISCONTINUED | OUTPATIENT
Start: 2024-12-17 | End: 2024-12-24 | Stop reason: HOSPADM

## 2024-12-17 RX ADMIN — INSULIN GLARGINE 10 UNITS: 100 INJECTION, SOLUTION SUBCUTANEOUS at 20:50

## 2024-12-17 RX ADMIN — CEFEPIME 2000 MG: 2 INJECTION, POWDER, FOR SOLUTION INTRAVENOUS at 23:40

## 2024-12-17 RX ADMIN — MICONAZOLE NITRATE: 2 POWDER TOPICAL at 20:50

## 2024-12-17 RX ADMIN — HYDRALAZINE HYDROCHLORIDE 100 MG: 50 TABLET ORAL at 20:48

## 2024-12-17 RX ADMIN — GABAPENTIN 100 MG: 100 CAPSULE ORAL at 20:48

## 2024-12-17 RX ADMIN — INSULIN LISPRO 3 UNITS: 100 INJECTION, SOLUTION INTRAVENOUS; SUBCUTANEOUS at 17:44

## 2024-12-17 RX ADMIN — HYDRALAZINE HYDROCHLORIDE 100 MG: 50 TABLET ORAL at 17:43

## 2024-12-17 RX ADMIN — ENOXAPARIN SODIUM 30 MG: 100 INJECTION SUBCUTANEOUS at 17:43

## 2024-12-17 RX ADMIN — SODIUM CHLORIDE, POTASSIUM CHLORIDE, SODIUM LACTATE AND CALCIUM CHLORIDE: 600; 310; 30; 20 INJECTION, SOLUTION INTRAVENOUS at 17:50

## 2024-12-17 RX ADMIN — MAGNESIUM SULFATE HEPTAHYDRATE 4000 MG: 80 INJECTION, SOLUTION INTRAVENOUS at 20:48

## 2024-12-17 RX ADMIN — SENNOSIDES 17.2 MG: 8.6 TABLET, FILM COATED ORAL at 17:43

## 2024-12-17 RX ADMIN — ATORVASTATIN CALCIUM 20 MG: 10 TABLET, FILM COATED ORAL at 20:48

## 2024-12-17 RX ADMIN — AMLODIPINE BESYLATE 10 MG: 10 TABLET ORAL at 17:43

## 2024-12-17 ASSESSMENT — PAIN DESCRIPTION - LOCATION: LOCATION: FOOT

## 2024-12-17 ASSESSMENT — PAIN - FUNCTIONAL ASSESSMENT: PAIN_FUNCTIONAL_ASSESSMENT: PREVENTS OR INTERFERES WITH MANY ACTIVE NOT PASSIVE ACTIVITIES

## 2024-12-17 ASSESSMENT — PAIN SCALES - GENERAL
PAINLEVEL_OUTOF10: 0
PAINLEVEL_OUTOF10: 3
PAINLEVEL_OUTOF10: 4

## 2024-12-17 ASSESSMENT — PAIN DESCRIPTION - ORIENTATION: ORIENTATION: LEFT

## 2024-12-17 ASSESSMENT — PAIN DESCRIPTION - PAIN TYPE: TYPE: CHRONIC PAIN

## 2024-12-17 ASSESSMENT — PAIN DESCRIPTION - DESCRIPTORS: DESCRIPTORS: ACHING;BURNING

## 2024-12-17 ASSESSMENT — PAIN DESCRIPTION - FREQUENCY: FREQUENCY: CONTINUOUS

## 2024-12-17 ASSESSMENT — PAIN SCALES - WONG BAKER
WONGBAKER_NUMERICALRESPONSE: HURTS LITTLE MORE
WONGBAKER_NUMERICALRESPONSE: HURTS LITTLE MORE

## 2024-12-17 NOTE — H&P
V2.0  History and Physical      Name:  Greg Easton /Age/Sex: 1940  (84 y.o. male)   MRN & CSN:  [id number] & [id number] Encounter Date/Time: 2024 5:05 PM EST   Location:  [address] [place] [address] PCP: Jacobo Zazueta MD       Hospital Day: 1    Assessment and Plan:   Greg Easton is a 84 y.o. male  who presents with Decubitus ulcer    Hospital Problems             Last Modified POA    * (Principal) Decubitus ulcer 2024 Yes       Assessment:  Concerns for sepsis secondary to left heel decubitus ulcer.  Sent by podiatry office podiatry and surgery has been consulted.  Started on vancomycin and cefepime cultures are pending de-escalate antibiotic based on culture result.  MRI of the left foot has also been ordered to assess for depth of the infection to look for osteomyelitis.  Telemetry in place.  Lab work has been ordered sepsis workup has been ordered.  Can be discontinued if sepsis is ruled out  Diabetes mellitus type 2 start the patient on Lantus and sliding scale insulin monitor for hypoglycemia  Hypothyroidism on levothyroxine  Rash around the groin miconazole powder to be started  Chronic GERD on PPI  Benign essential hypertension on amlodipine 10 mg oral daily along with hydralazine home dose 100 mg every 8 hours scheduled  Hyperlipidemia on Lipitor 20 mg nightly       Medical Decision Making:  The following items were considered in medical decision making:  Discussion of patient care with other providers  Reviewed clinical lab tests if any  Reviewed radiology tests if any  Reviewed other diagnostic tests/interventions  Independent review of radiologic images if any  Microbiology cultures and other micro tests if any    Estimated time spent for medical decision-making encompassing complexity of the case, history taking, medication review, physical examination, communication with family, RN, , discussion with specialists, and ancillary staff members to provide accurate care for the

## 2024-12-18 ENCOUNTER — ANESTHESIA EVENT (OUTPATIENT)
Dept: OPERATING ROOM | Age: 84
End: 2024-12-18
Payer: MEDICARE

## 2024-12-18 ENCOUNTER — ANESTHESIA (OUTPATIENT)
Dept: OPERATING ROOM | Age: 84
End: 2024-12-18
Payer: MEDICARE

## 2024-12-18 PROBLEM — L97.422 DIABETIC ULCER OF LEFT HEEL ASSOCIATED WITH DIABETES MELLITUS DUE TO UNDERLYING CONDITION, WITH FAT LAYER EXPOSED (HCC): Status: ACTIVE | Noted: 2024-12-18

## 2024-12-18 PROBLEM — E08.621 DIABETIC ULCER OF LEFT HEEL ASSOCIATED WITH DIABETES MELLITUS DUE TO UNDERLYING CONDITION, WITH FAT LAYER EXPOSED (HCC): Status: ACTIVE | Noted: 2024-12-18

## 2024-12-18 LAB
ANION GAP SERPL CALCULATED.3IONS-SCNC: 8 MMOL/L (ref 9–17)
BACTERIA URNS QL MICRO: ABNORMAL
BUN SERPL-MCNC: 32 MG/DL (ref 7–20)
CALCIUM SERPL-MCNC: 8.5 MG/DL (ref 8.3–10.6)
CHLORIDE SERPL-SCNC: 110 MMOL/L (ref 99–110)
CO2 SERPL-SCNC: 19 MMOL/L (ref 21–32)
CREAT SERPL-MCNC: 2.2 MG/DL (ref 0.8–1.3)
DATE LAST DOSE: ABNORMAL
EPI CELLS #/AREA URNS HPF: <1 /HPF
ERYTHROCYTE [DISTWIDTH] IN BLOOD BY AUTOMATED COUNT: 15.3 % (ref 11.7–14.9)
EST. AVERAGE GLUCOSE BLD GHB EST-MCNC: 161 MG/DL
GFR, ESTIMATED: 28 ML/MIN/1.73M2
GLUCOSE BLD-MCNC: 114 MG/DL (ref 74–99)
GLUCOSE BLD-MCNC: 129 MG/DL (ref 74–99)
GLUCOSE BLD-MCNC: 211 MG/DL (ref 74–99)
GLUCOSE BLD-MCNC: 276 MG/DL (ref 74–99)
GLUCOSE SERPL-MCNC: 151 MG/DL (ref 74–99)
HBA1C MFR BLD: 7.2 % (ref 4.2–6.3)
HCT VFR BLD AUTO: 25 % (ref 42–52)
HGB BLD-MCNC: 7.7 G/DL (ref 13.5–18)
MAGNESIUM SERPL-MCNC: 2.1 MG/DL (ref 1.8–2.4)
MCH RBC QN AUTO: 31.2 PG (ref 27–31)
MCHC RBC AUTO-ENTMCNC: 30.8 G/DL (ref 32–36)
MCV RBC AUTO: 101.2 FL (ref 78–100)
PHOSPHATE SERPL-MCNC: 2.5 MG/DL (ref 2.5–4.9)
PLATELET # BLD AUTO: 186 K/UL (ref 140–440)
PMV BLD AUTO: 11.1 FL (ref 7.5–11.1)
POTASSIUM SERPL-SCNC: 4.2 MMOL/L (ref 3.5–5.1)
RBC # BLD AUTO: 2.47 M/UL (ref 4.6–6.2)
RBC #/AREA URNS HPF: 6 /HPF (ref 0–2)
SODIUM SERPL-SCNC: 137 MMOL/L (ref 136–145)
TME LAST DOSE: ABNORMAL H
TRICHOMONAS #/AREA URNS HPF: ABNORMAL /[HPF]
VANCOMYCIN DOSE: ABNORMAL MG
VANCOMYCIN SERPL-MCNC: 27.2 UG/ML (ref 10–20)
WBC #/AREA URNS HPF: 136 /HPF (ref 0–5)
WBC OTHER # BLD: 6.4 K/UL (ref 4–10.5)

## 2024-12-18 PROCEDURE — 6370000000 HC RX 637 (ALT 250 FOR IP): Performed by: STUDENT IN AN ORGANIZED HEALTH CARE EDUCATION/TRAINING PROGRAM

## 2024-12-18 PROCEDURE — 2580000003 HC RX 258: Performed by: NURSE ANESTHETIST, CERTIFIED REGISTERED

## 2024-12-18 PROCEDURE — 6370000000 HC RX 637 (ALT 250 FOR IP): Performed by: SURGERY

## 2024-12-18 PROCEDURE — 3700000001 HC ADD 15 MINUTES (ANESTHESIA): Performed by: SURGERY

## 2024-12-18 PROCEDURE — 2580000003 HC RX 258: Performed by: SURGERY

## 2024-12-18 PROCEDURE — 3600000012 HC SURGERY LEVEL 2 ADDTL 15MIN: Performed by: SURGERY

## 2024-12-18 PROCEDURE — 1200000000 HC SEMI PRIVATE

## 2024-12-18 PROCEDURE — 2500000003 HC RX 250 WO HCPCS: Performed by: SURGERY

## 2024-12-18 PROCEDURE — 83735 ASSAY OF MAGNESIUM: CPT

## 2024-12-18 PROCEDURE — 87205 SMEAR GRAM STAIN: CPT

## 2024-12-18 PROCEDURE — 80048 BASIC METABOLIC PNL TOTAL CA: CPT

## 2024-12-18 PROCEDURE — 84100 ASSAY OF PHOSPHORUS: CPT

## 2024-12-18 PROCEDURE — 87075 CULTR BACTERIA EXCEPT BLOOD: CPT

## 2024-12-18 PROCEDURE — 87076 CULTURE ANAEROBE IDENT EACH: CPT

## 2024-12-18 PROCEDURE — 2709999900 HC NON-CHARGEABLE SUPPLY: Performed by: SURGERY

## 2024-12-18 PROCEDURE — 7100000001 HC PACU RECOVERY - ADDTL 15 MIN: Performed by: SURGERY

## 2024-12-18 PROCEDURE — 3600000002 HC SURGERY LEVEL 2 BASE: Performed by: SURGERY

## 2024-12-18 PROCEDURE — 2580000003 HC RX 258: Performed by: STUDENT IN AN ORGANIZED HEALTH CARE EDUCATION/TRAINING PROGRAM

## 2024-12-18 PROCEDURE — 87185 SC STD ENZYME DETCJ PER NZM: CPT

## 2024-12-18 PROCEDURE — 6360000002 HC RX W HCPCS: Performed by: STUDENT IN AN ORGANIZED HEALTH CARE EDUCATION/TRAINING PROGRAM

## 2024-12-18 PROCEDURE — 7100000000 HC PACU RECOVERY - FIRST 15 MIN: Performed by: SURGERY

## 2024-12-18 PROCEDURE — 6360000002 HC RX W HCPCS: Performed by: SURGERY

## 2024-12-18 PROCEDURE — 87070 CULTURE OTHR SPECIMN AEROBIC: CPT

## 2024-12-18 PROCEDURE — 82962 GLUCOSE BLOOD TEST: CPT

## 2024-12-18 PROCEDURE — 80202 ASSAY OF VANCOMYCIN: CPT

## 2024-12-18 PROCEDURE — 11043 DBRDMT MUSC&/FSCA 1ST 20/<: CPT | Performed by: SURGERY

## 2024-12-18 PROCEDURE — 94761 N-INVAS EAR/PLS OXIMETRY MLT: CPT

## 2024-12-18 PROCEDURE — 85027 COMPLETE CBC AUTOMATED: CPT

## 2024-12-18 PROCEDURE — 2500000003 HC RX 250 WO HCPCS: Performed by: STUDENT IN AN ORGANIZED HEALTH CARE EDUCATION/TRAINING PROGRAM

## 2024-12-18 PROCEDURE — 0KBW0ZZ EXCISION OF LEFT FOOT MUSCLE, OPEN APPROACH: ICD-10-PCS | Performed by: SURGERY

## 2024-12-18 PROCEDURE — 36415 COLL VENOUS BLD VENIPUNCTURE: CPT

## 2024-12-18 PROCEDURE — 87147 CULTURE TYPE IMMUNOLOGIC: CPT

## 2024-12-18 PROCEDURE — 83036 HEMOGLOBIN GLYCOSYLATED A1C: CPT

## 2024-12-18 PROCEDURE — 3700000000 HC ANESTHESIA ATTENDED CARE: Performed by: SURGERY

## 2024-12-18 PROCEDURE — 99222 1ST HOSP IP/OBS MODERATE 55: CPT | Performed by: SURGERY

## 2024-12-18 PROCEDURE — 87077 CULTURE AEROBIC IDENTIFY: CPT

## 2024-12-18 PROCEDURE — 6360000002 HC RX W HCPCS: Performed by: NURSE ANESTHETIST, CERTIFIED REGISTERED

## 2024-12-18 PROCEDURE — 88305 TISSUE EXAM BY PATHOLOGIST: CPT

## 2024-12-18 PROCEDURE — 87186 SC STD MICRODIL/AGAR DIL: CPT

## 2024-12-18 RX ORDER — DIPHENHYDRAMINE HYDROCHLORIDE 50 MG/ML
12.5 INJECTION INTRAMUSCULAR; INTRAVENOUS
Status: DISCONTINUED | OUTPATIENT
Start: 2024-12-18 | End: 2024-12-18 | Stop reason: HOSPADM

## 2024-12-18 RX ORDER — OXYCODONE HYDROCHLORIDE 5 MG/1
5 TABLET ORAL
Status: DISCONTINUED | OUTPATIENT
Start: 2024-12-18 | End: 2024-12-18 | Stop reason: HOSPADM

## 2024-12-18 RX ORDER — FENTANYL CITRATE 50 UG/ML
25 INJECTION, SOLUTION INTRAMUSCULAR; INTRAVENOUS EVERY 5 MIN PRN
Status: DISCONTINUED | OUTPATIENT
Start: 2024-12-18 | End: 2024-12-18 | Stop reason: HOSPADM

## 2024-12-18 RX ORDER — PROCHLORPERAZINE EDISYLATE 5 MG/ML
5 INJECTION INTRAMUSCULAR; INTRAVENOUS
Status: DISCONTINUED | OUTPATIENT
Start: 2024-12-18 | End: 2024-12-18 | Stop reason: HOSPADM

## 2024-12-18 RX ORDER — LIDOCAINE HYDROCHLORIDE 20 MG/ML
INJECTION, SOLUTION EPIDURAL; INFILTRATION; INTRACAUDAL; PERINEURAL
Status: DISCONTINUED | OUTPATIENT
Start: 2024-12-18 | End: 2024-12-18 | Stop reason: SDUPTHER

## 2024-12-18 RX ORDER — ONDANSETRON 2 MG/ML
4 INJECTION INTRAMUSCULAR; INTRAVENOUS
Status: DISCONTINUED | OUTPATIENT
Start: 2024-12-18 | End: 2024-12-18 | Stop reason: HOSPADM

## 2024-12-18 RX ORDER — LIDOCAINE HYDROCHLORIDE 10 MG/ML
INJECTION, SOLUTION EPIDURAL; INFILTRATION; INTRACAUDAL; PERINEURAL
Status: COMPLETED | OUTPATIENT
Start: 2024-12-18 | End: 2024-12-18

## 2024-12-18 RX ORDER — HYDROCODONE BITARTRATE AND ACETAMINOPHEN 5; 325 MG/1; MG/1
1 TABLET ORAL EVERY 6 HOURS PRN
Status: DISCONTINUED | OUTPATIENT
Start: 2024-12-18 | End: 2024-12-21

## 2024-12-18 RX ORDER — HYDRALAZINE HYDROCHLORIDE 20 MG/ML
10 INJECTION INTRAMUSCULAR; INTRAVENOUS
Status: DISCONTINUED | OUTPATIENT
Start: 2024-12-18 | End: 2024-12-18 | Stop reason: HOSPADM

## 2024-12-18 RX ORDER — SODIUM CHLORIDE 0.9 % (FLUSH) 0.9 %
5-40 SYRINGE (ML) INJECTION EVERY 12 HOURS SCHEDULED
Status: DISCONTINUED | OUTPATIENT
Start: 2024-12-18 | End: 2024-12-18 | Stop reason: HOSPADM

## 2024-12-18 RX ORDER — SODIUM CHLORIDE 9 MG/ML
INJECTION, SOLUTION INTRAVENOUS PRN
Status: DISCONTINUED | OUTPATIENT
Start: 2024-12-18 | End: 2024-12-18 | Stop reason: HOSPADM

## 2024-12-18 RX ORDER — SODIUM CHLORIDE 0.9 % (FLUSH) 0.9 %
5-40 SYRINGE (ML) INJECTION PRN
Status: DISCONTINUED | OUTPATIENT
Start: 2024-12-18 | End: 2024-12-18 | Stop reason: HOSPADM

## 2024-12-18 RX ORDER — LABETALOL HYDROCHLORIDE 5 MG/ML
10 INJECTION, SOLUTION INTRAVENOUS
Status: DISCONTINUED | OUTPATIENT
Start: 2024-12-18 | End: 2024-12-18 | Stop reason: HOSPADM

## 2024-12-18 RX ORDER — NALOXONE HYDROCHLORIDE 0.4 MG/ML
INJECTION, SOLUTION INTRAMUSCULAR; INTRAVENOUS; SUBCUTANEOUS PRN
Status: DISCONTINUED | OUTPATIENT
Start: 2024-12-18 | End: 2024-12-18 | Stop reason: HOSPADM

## 2024-12-18 RX ORDER — SODIUM CHLORIDE 9 MG/ML
INJECTION, SOLUTION INTRAVENOUS
Status: DISCONTINUED | OUTPATIENT
Start: 2024-12-18 | End: 2024-12-18 | Stop reason: SDUPTHER

## 2024-12-18 RX ORDER — PROPOFOL 10 MG/ML
INJECTION, EMULSION INTRAVENOUS
Status: DISCONTINUED | OUTPATIENT
Start: 2024-12-18 | End: 2024-12-18 | Stop reason: SDUPTHER

## 2024-12-18 RX ADMIN — CEFAZOLIN 2000 MG: 2 INJECTION, POWDER, FOR SOLUTION INTRAMUSCULAR; INTRAVENOUS at 17:23

## 2024-12-18 RX ADMIN — SODIUM CHLORIDE, PRESERVATIVE FREE 10 ML: 5 INJECTION INTRAVENOUS at 08:51

## 2024-12-18 RX ADMIN — SODIUM CHLORIDE, PRESERVATIVE FREE 10 ML: 5 INJECTION INTRAVENOUS at 21:54

## 2024-12-18 RX ADMIN — CEFEPIME 1000 MG: 1 INJECTION, POWDER, FOR SOLUTION INTRAMUSCULAR; INTRAVENOUS at 12:26

## 2024-12-18 RX ADMIN — GABAPENTIN 100 MG: 100 CAPSULE ORAL at 08:51

## 2024-12-18 RX ADMIN — HYDRALAZINE HYDROCHLORIDE 100 MG: 50 TABLET ORAL at 14:52

## 2024-12-18 RX ADMIN — LIDOCAINE HYDROCHLORIDE 100 MG: 20 INJECTION, SOLUTION EPIDURAL; INFILTRATION; INTRACAUDAL; PERINEURAL at 17:29

## 2024-12-18 RX ADMIN — GABAPENTIN 100 MG: 100 CAPSULE ORAL at 23:00

## 2024-12-18 RX ADMIN — MICONAZOLE NITRATE: 2 POWDER TOPICAL at 21:55

## 2024-12-18 RX ADMIN — CEFEPIME 1000 MG: 1 INJECTION, POWDER, FOR SOLUTION INTRAMUSCULAR; INTRAVENOUS at 23:09

## 2024-12-18 RX ADMIN — SODIUM CHLORIDE: 9 INJECTION, SOLUTION INTRAVENOUS at 12:25

## 2024-12-18 RX ADMIN — SODIUM CHLORIDE: 9 INJECTION, SOLUTION INTRAVENOUS at 17:22

## 2024-12-18 RX ADMIN — VANCOMYCIN HYDROCHLORIDE 2000 MG: 5 INJECTION, POWDER, LYOPHILIZED, FOR SOLUTION INTRAVENOUS at 00:24

## 2024-12-18 RX ADMIN — HYDRALAZINE HYDROCHLORIDE 100 MG: 50 TABLET ORAL at 23:00

## 2024-12-18 RX ADMIN — INSULIN LISPRO 1 UNITS: 100 INJECTION, SOLUTION INTRAVENOUS; SUBCUTANEOUS at 21:54

## 2024-12-18 RX ADMIN — AMLODIPINE BESYLATE 10 MG: 10 TABLET ORAL at 08:51

## 2024-12-18 RX ADMIN — INSULIN GLARGINE 10 UNITS: 100 INJECTION, SOLUTION SUBCUTANEOUS at 21:54

## 2024-12-18 RX ADMIN — LEVOTHYROXINE SODIUM 75 MCG: 0.07 TABLET ORAL at 06:33

## 2024-12-18 RX ADMIN — SENNOSIDES 17.2 MG: 8.6 TABLET, FILM COATED ORAL at 08:51

## 2024-12-18 RX ADMIN — INSULIN LISPRO 2 UNITS: 100 INJECTION, SOLUTION INTRAVENOUS; SUBCUTANEOUS at 12:20

## 2024-12-18 RX ADMIN — SODIUM CHLORIDE, POTASSIUM CHLORIDE, SODIUM LACTATE AND CALCIUM CHLORIDE: 600; 310; 30; 20 INJECTION, SOLUTION INTRAVENOUS at 07:10

## 2024-12-18 RX ADMIN — MICONAZOLE NITRATE: 2 POWDER TOPICAL at 08:51

## 2024-12-18 RX ADMIN — PROPOFOL 80 MG: 10 INJECTION, EMULSION INTRAVENOUS at 17:29

## 2024-12-18 RX ADMIN — ENOXAPARIN SODIUM 30 MG: 100 INJECTION SUBCUTANEOUS at 08:51

## 2024-12-18 RX ADMIN — PANTOPRAZOLE SODIUM 40 MG: 40 TABLET, DELAYED RELEASE ORAL at 06:32

## 2024-12-18 RX ADMIN — ASPIRIN 81 MG: 81 TABLET, CHEWABLE ORAL at 08:51

## 2024-12-18 RX ADMIN — HYDRALAZINE HYDROCHLORIDE 100 MG: 50 TABLET ORAL at 06:33

## 2024-12-18 RX ADMIN — ATORVASTATIN CALCIUM 20 MG: 10 TABLET, FILM COATED ORAL at 21:54

## 2024-12-18 ASSESSMENT — LIFESTYLE VARIABLES: SMOKING_STATUS: 0

## 2024-12-18 NOTE — OP NOTE
Operating Report    Patient ID:  Greg Easton  [id number]  84 y.o.  1940    Date of Procedure: 12/18/24    Surgeon: Darion Wallace MD, [facility], [facility]    Assistant: none    Anesthesia: General and MAC    IVF: Per anesthesia mL IV crystalloid    EBL: Less than 5 mL    Specimen(s): 1. Culture of left heel ulcer    2. Tissue of left heel ulcer    Complications:  None apparent    Indication for Procedure: left heel ulcer    Pre-operative Diagnosis: same as above    Post-operative Diagnosis: same as above, 4 cm x 3 cm     Operation:  Excisional debridement of left heel ulcer down to and including muscle  Total square cm area: 12 cm sq    Findings:   Consistent with post-operative diagnosis    Procedure Details:  The risk, benefits, expected outcome, and alternative to the recommended procedure have been discussed with the patient.  Patient understands and wants to proceed with the procedure.     Procedure:    The risks, benefits, alternatives were discussed with the patient. After agreeing to proceed, the patient was transported to the operating room.    The patient was transferred to the operating room table and positioned appropriately with carefull attention to all pressure points.     Safety straps were placed.     Anesthesia was started without complication.     An approved timeout was held with all members of the team present and in agreement.     The patient had appropriate antibiotics administered in accordance with national protocols.     The above wound was cleansed with betadine and draped in a sterile fashion.     An Excisional Debridement down to and including muscle.     The wound was packed with moistened gauze and covered with ABD and tape.     At the end of the case, the patient was awoken and transported to the recovery room.    At the end of the case, the surgical counts were correct.     At the end of the case, there was no family present to discuss the outcome.     Dr. Wallace was present,

## 2024-12-18 NOTE — ANESTHESIA PRE PROCEDURE
BILITOT <0.2 11/05/2024 04:21 PM    ALKPHOS 95 11/05/2024 04:21 PM    AST 30 11/05/2024 04:21 PM    ALT 19 11/05/2024 04:21 PM       POC Tests:   Recent Labs     12/18/24  1452   POCGLU 114*       Coags:   Lab Results   Component Value Date/Time    PROTIME 15.7 04/16/2024 09:59 AM    PROTIME 15.2 01/24/2011 06:48 PM    INR 1.2 04/16/2024 09:59 AM    APTT 37.6 09/12/2022 01:24 PM       HCG (If Applicable): No results found for: \"PREGTESTUR\", \"PREGSERUM\", \"HCG\", \"HCGQUANT\"     ABGs:   Lab Results   Component Value Date/Time    PO2ART 37 10/24/2022 04:15 PM    TSU8IOS 49.0 10/24/2022 04:15 PM    GAW1RYL 25.2 10/24/2022 04:15 PM        Type & Screen (If Applicable):  No results found for: \"LABABO\"    Drug/Infectious Status (If Applicable):  No results found for: \"HIV\", \"HEPCAB\"    COVID-19 Screening (If Applicable):   Lab Results   Component Value Date/Time    COVID19 NOT DETECTED 05/26/2023 12:15 PM    COVID19 NOT DETECTED 08/28/2022 07:05 PM           Anesthesia Evaluation  Patient summary reviewed and Nursing notes reviewed   no history of anesthetic complications:   Airway: Mallampati: II  TM distance: >3 FB   Neck ROM: full  Mouth opening: > = 3 FB   Dental:    (+) upper dentures and lower dentures      Pulmonary: breath sounds clear to auscultation      (-) COPD, asthma and not a current smoker                           Cardiovascular:  Exercise tolerance: poor (<4 METS)  (+) hypertension:, dysrhythmias: SVT, CHF:, hyperlipidemia        Rhythm: regular  Rate: normal  Echocardiogram reviewed               ROS comment: ECHO 5/2024  ·  Left Ventricle: Normal left ventricular systolic function with a visually estimated EF of 60 - 65%.  Mildly increased wall thickness.  ·  No significant valvular disease noted.  ·  Pericardium: No pericardial effusion.       Neuro/Psych:   Negative Neuro/Psych ROS  (+) neuromuscular disease:            GI/Hepatic/Renal:   (+) renal disease: CRI          Endo/Other:    (+) DiabetesType

## 2024-12-18 NOTE — ANESTHESIA POSTPROCEDURE EVALUATION
Department of Anesthesiology  Postprocedure Note    Patient: Greg Easton  MRN: [id number]  YOB: 1940  Date of evaluation: 12/18/2024    Procedure Summary       Date: 12/18/24 Room / Location: [address]    Anesthesia Start: 1720 Anesthesia Stop: 1753    Procedure: FOOT DEBRIDEMENT INCISION AND DRAINAGE (Left: Foot) Diagnosis:       Penetrating foot wound, left, initial encounter      (Penetrating foot wound, left, initial encounter [S91.332A])    Surgeons: Darion Wallace II, MD Responsible Provider: Harshad Edge MD    Anesthesia Type: MAC, general ASA Status: 3            Anesthesia Type: No value filed.    Jackeline Phase I: Jackeline Score: 9    Jackeline Phase II:      Anesthesia Post Evaluation    Patient location during evaluation: PACU  Patient participation: complete - patient participated  Level of consciousness: awake and alert  Airway patency: patent  Nausea & Vomiting: no nausea and no vomiting  Cardiovascular status: blood pressure returned to baseline  Respiratory status: acceptable  Hydration status: euvolemic  Multimodal analgesia pain management approach  Pain management: adequate    No notable events documented.

## 2024-12-18 NOTE — CARE COORDINATION
12/18/24 7409   Service Assessment   Patient Orientation Alert and Oriented   Cognition Alert   History Provided By Patient;Medical Record   Primary Caregiver Self   Support Systems Children   PCP Verified by CM Yes   Last Visit to PCP Within last 6 months   Prior Functional Level Independent in ADLs/IADLs;Bathing;Dressing;Mobility   Current Functional Level Independent in ADLs/IADLs;Bathing;Dressing;Mobility   Can patient return to prior living arrangement Unknown at present   Ability to make needs known: Good   Family able to assist with home care needs: Yes   Would you like for me to discuss the discharge plan with any other family members/significant others, and if so, who? Yes  (may sons, Gilmer Reyna at Bedside)   Financial Resources Medicare  (Medicare and [facility])   Community Resources ECF/Home Care  ([facility] for nursing and therapy)   Social/Functional History   Lives With Son  (lives with gilmer Ramos)   Type of Home House   Home Layout One level   Home Equipment Wheelchair - Manual;Walker - Rolling   Active  No   Patient's  Info family provides all transportation   Condition of Participation: Discharge Planning   The Patient and/or Patient Representative was provided with a Choice of Provider? Patient   The Patient and/Or Patient Representative agree with the Discharge Plan? Yes   Freedom of Choice list was provided with basic dialogue that supports the patient's individualized plan of care/goals, treatment preferences, and shares the quality data associated with the providers?  Yes     Reviewed chart, discussed in IDR and spoke with pt and gilmer Reyna about discharge needs /plans. Pt lives with gilmer Reyna and is active with [facility].  Pt's 1st choice is to return home with [facility], if iv atb needed daily family would be able to learn and give to pt .  If needs more frequent iv atb would need SNU , he has been to facilities in past but would like to review SNU for back up

## 2024-12-19 ENCOUNTER — APPOINTMENT (OUTPATIENT)
Dept: MRI IMAGING | Age: 84
DRG: 622 | End: 2024-12-19
Attending: STUDENT IN AN ORGANIZED HEALTH CARE EDUCATION/TRAINING PROGRAM
Payer: MEDICARE

## 2024-12-19 ENCOUNTER — APPOINTMENT (OUTPATIENT)
Dept: ULTRASOUND IMAGING | Age: 84
DRG: 622 | End: 2024-12-19
Attending: STUDENT IN AN ORGANIZED HEALTH CARE EDUCATION/TRAINING PROGRAM
Payer: MEDICARE

## 2024-12-19 LAB
ANION GAP SERPL CALCULATED.3IONS-SCNC: 11 MMOL/L (ref 9–17)
BUN SERPL-MCNC: 28 MG/DL (ref 7–20)
CALCIUM SERPL-MCNC: 8 MG/DL (ref 8.3–10.6)
CHLORIDE SERPL-SCNC: 110 MMOL/L (ref 99–110)
CO2 SERPL-SCNC: 17 MMOL/L (ref 21–32)
CREAT SERPL-MCNC: 2.2 MG/DL (ref 0.8–1.3)
ERYTHROCYTE [DISTWIDTH] IN BLOOD BY AUTOMATED COUNT: 15.4 % (ref 11.7–14.9)
GFR, ESTIMATED: 29 ML/MIN/1.73M2
GLUCOSE BLD-MCNC: 126 MG/DL (ref 74–99)
GLUCOSE BLD-MCNC: 148 MG/DL (ref 74–99)
GLUCOSE BLD-MCNC: 204 MG/DL (ref 74–99)
GLUCOSE BLD-MCNC: 43 MG/DL (ref 74–99)
GLUCOSE BLD-MCNC: 87 MG/DL (ref 74–99)
GLUCOSE SERPL-MCNC: 135 MG/DL (ref 74–99)
HCT VFR BLD AUTO: 25 % (ref 42–52)
HGB BLD-MCNC: 7.5 G/DL (ref 13.5–18)
MAGNESIUM SERPL-MCNC: 1.8 MG/DL (ref 1.8–2.4)
MCH RBC QN AUTO: 30.4 PG (ref 27–31)
MCHC RBC AUTO-ENTMCNC: 30 G/DL (ref 32–36)
MCV RBC AUTO: 101.2 FL (ref 78–100)
MICROORGANISM SPEC CULT: ABNORMAL
MICROORGANISM SPEC CULT: ABNORMAL
PHOSPHATE SERPL-MCNC: 2.7 MG/DL (ref 2.5–4.9)
PLATELET # BLD AUTO: 195 K/UL (ref 140–440)
PMV BLD AUTO: 11.2 FL (ref 7.5–11.1)
POTASSIUM SERPL-SCNC: 4.6 MMOL/L (ref 3.5–5.1)
RBC # BLD AUTO: 2.47 M/UL (ref 4.6–6.2)
SODIUM SERPL-SCNC: 138 MMOL/L (ref 136–145)
SPECIMEN DESCRIPTION: ABNORMAL
WBC OTHER # BLD: 6.4 K/UL (ref 4–10.5)

## 2024-12-19 PROCEDURE — 36415 COLL VENOUS BLD VENIPUNCTURE: CPT

## 2024-12-19 PROCEDURE — 93925 LOWER EXTREMITY STUDY: CPT

## 2024-12-19 PROCEDURE — 6370000000 HC RX 637 (ALT 250 FOR IP): Performed by: SURGERY

## 2024-12-19 PROCEDURE — 2500000003 HC RX 250 WO HCPCS: Performed by: SURGERY

## 2024-12-19 PROCEDURE — 94761 N-INVAS EAR/PLS OXIMETRY MLT: CPT

## 2024-12-19 PROCEDURE — 73721 MRI JNT OF LWR EXTRE W/O DYE: CPT

## 2024-12-19 PROCEDURE — 97605 NEG PRS WND THER DME<=50SQCM: CPT

## 2024-12-19 PROCEDURE — 80202 ASSAY OF VANCOMYCIN: CPT

## 2024-12-19 PROCEDURE — 6360000002 HC RX W HCPCS: Performed by: STUDENT IN AN ORGANIZED HEALTH CARE EDUCATION/TRAINING PROGRAM

## 2024-12-19 PROCEDURE — 82962 GLUCOSE BLOOD TEST: CPT

## 2024-12-19 PROCEDURE — 2580000003 HC RX 258: Performed by: SURGERY

## 2024-12-19 PROCEDURE — 80048 BASIC METABOLIC PNL TOTAL CA: CPT

## 2024-12-19 PROCEDURE — 1200000000 HC SEMI PRIVATE

## 2024-12-19 PROCEDURE — 85027 COMPLETE CBC AUTOMATED: CPT

## 2024-12-19 PROCEDURE — 6360000002 HC RX W HCPCS: Performed by: SURGERY

## 2024-12-19 PROCEDURE — 84100 ASSAY OF PHOSPHORUS: CPT

## 2024-12-19 PROCEDURE — 99232 SBSQ HOSP IP/OBS MODERATE 35: CPT | Performed by: SURGERY

## 2024-12-19 PROCEDURE — 6370000000 HC RX 637 (ALT 250 FOR IP): Performed by: STUDENT IN AN ORGANIZED HEALTH CARE EDUCATION/TRAINING PROGRAM

## 2024-12-19 PROCEDURE — 83735 ASSAY OF MAGNESIUM: CPT

## 2024-12-19 RX ORDER — HYDROMORPHONE HYDROCHLORIDE 1 MG/ML
0.5 INJECTION, SOLUTION INTRAMUSCULAR; INTRAVENOUS; SUBCUTANEOUS
Status: COMPLETED | OUTPATIENT
Start: 2024-12-19 | End: 2024-12-19

## 2024-12-19 RX ORDER — INSULIN GLARGINE 100 [IU]/ML
5 INJECTION, SOLUTION SUBCUTANEOUS NIGHTLY
Status: DISCONTINUED | OUTPATIENT
Start: 2024-12-19 | End: 2024-12-24 | Stop reason: HOSPADM

## 2024-12-19 RX ADMIN — HYDROCODONE BITARTRATE AND ACETAMINOPHEN 1 TABLET: 5; 325 TABLET ORAL at 00:55

## 2024-12-19 RX ADMIN — INSULIN GLARGINE 5 UNITS: 100 INJECTION, SOLUTION SUBCUTANEOUS at 21:59

## 2024-12-19 RX ADMIN — ASPIRIN 81 MG: 81 TABLET, CHEWABLE ORAL at 09:14

## 2024-12-19 RX ADMIN — HYDROCODONE BITARTRATE AND ACETAMINOPHEN 1 TABLET: 5; 325 TABLET ORAL at 09:14

## 2024-12-19 RX ADMIN — GABAPENTIN 100 MG: 100 CAPSULE ORAL at 21:36

## 2024-12-19 RX ADMIN — MICONAZOLE NITRATE: 2 POWDER TOPICAL at 21:41

## 2024-12-19 RX ADMIN — MICONAZOLE NITRATE: 2 POWDER TOPICAL at 09:16

## 2024-12-19 RX ADMIN — SODIUM CHLORIDE, PRESERVATIVE FREE 10 ML: 5 INJECTION INTRAVENOUS at 21:41

## 2024-12-19 RX ADMIN — ENOXAPARIN SODIUM 30 MG: 100 INJECTION SUBCUTANEOUS at 09:14

## 2024-12-19 RX ADMIN — VANCOMYCIN HYDROCHLORIDE 750 MG: 750 INJECTION, POWDER, LYOPHILIZED, FOR SOLUTION INTRAVENOUS at 18:42

## 2024-12-19 RX ADMIN — LEVOTHYROXINE SODIUM 75 MCG: 0.07 TABLET ORAL at 06:14

## 2024-12-19 RX ADMIN — SODIUM CHLORIDE 25 ML: 9 INJECTION, SOLUTION INTRAVENOUS at 23:57

## 2024-12-19 RX ADMIN — SODIUM CHLORIDE, PRESERVATIVE FREE 10 ML: 5 INJECTION INTRAVENOUS at 09:16

## 2024-12-19 RX ADMIN — COLLAGENASE SANTYL: 250 OINTMENT TOPICAL at 09:15

## 2024-12-19 RX ADMIN — HYDRALAZINE HYDROCHLORIDE 100 MG: 50 TABLET ORAL at 06:14

## 2024-12-19 RX ADMIN — Medication 16 G: at 17:41

## 2024-12-19 RX ADMIN — SODIUM CHLORIDE, PRESERVATIVE FREE 10 ML: 5 INJECTION INTRAVENOUS at 23:54

## 2024-12-19 RX ADMIN — HYDRALAZINE HYDROCHLORIDE 100 MG: 50 TABLET ORAL at 12:53

## 2024-12-19 RX ADMIN — HYDRALAZINE HYDROCHLORIDE 100 MG: 50 TABLET ORAL at 21:36

## 2024-12-19 RX ADMIN — CEFEPIME 1000 MG: 1 INJECTION, POWDER, FOR SOLUTION INTRAMUSCULAR; INTRAVENOUS at 23:59

## 2024-12-19 RX ADMIN — INSULIN LISPRO 1 UNITS: 100 INJECTION, SOLUTION INTRAVENOUS; SUBCUTANEOUS at 12:50

## 2024-12-19 RX ADMIN — AMLODIPINE BESYLATE 10 MG: 10 TABLET ORAL at 09:14

## 2024-12-19 RX ADMIN — PANTOPRAZOLE SODIUM 40 MG: 40 TABLET, DELAYED RELEASE ORAL at 06:14

## 2024-12-19 RX ADMIN — ATORVASTATIN CALCIUM 20 MG: 10 TABLET, FILM COATED ORAL at 21:36

## 2024-12-19 RX ADMIN — HYDROMORPHONE HYDROCHLORIDE 0.5 MG: 1 INJECTION, SOLUTION INTRAMUSCULAR; INTRAVENOUS; SUBCUTANEOUS at 12:50

## 2024-12-19 RX ADMIN — CEFEPIME 1000 MG: 1 INJECTION, POWDER, FOR SOLUTION INTRAMUSCULAR; INTRAVENOUS at 12:52

## 2024-12-19 RX ADMIN — GABAPENTIN 100 MG: 100 CAPSULE ORAL at 09:14

## 2024-12-19 RX ADMIN — SENNOSIDES 17.2 MG: 8.6 TABLET, FILM COATED ORAL at 09:14

## 2024-12-19 ASSESSMENT — PAIN DESCRIPTION - ORIENTATION
ORIENTATION: LEFT

## 2024-12-19 ASSESSMENT — PAIN SCALES - GENERAL
PAINLEVEL_OUTOF10: 10
PAINLEVEL_OUTOF10: 0
PAINLEVEL_OUTOF10: 9
PAINLEVEL_OUTOF10: 0
PAINLEVEL_OUTOF10: 10
PAINLEVEL_OUTOF10: 0

## 2024-12-19 ASSESSMENT — PAIN DESCRIPTION - LOCATION
LOCATION: FOOT

## 2024-12-19 ASSESSMENT — PAIN DESCRIPTION - DESCRIPTORS: DESCRIPTORS: ACHING;BURNING

## 2024-12-19 ASSESSMENT — PAIN - FUNCTIONAL ASSESSMENT: PAIN_FUNCTIONAL_ASSESSMENT: PREVENTS OR INTERFERES WITH ALL ACTIVE AND SOME PASSIVE ACTIVITIES

## 2024-12-19 NOTE — CARE COORDINATION
CM received update from Tammy wound care RN, that wound vac was placed on patient.     Perfect serve sent to Dr. Wallace to clarify if plan is for patient to discharge home w/ wound vac. Per Dr. Wallace, patient will need wound vac ordered.     Call to [employer] rep, and shared patient information. Maira stated that she would start process for patients home wound vac.     1:23 PM Physician prescription faxed to [employer] at this time.

## 2024-12-19 NOTE — CARE COORDINATION
Reviewed chart, discussed in IDR, not clear if will need iv atb at discharge and know has a [employer] VAC.   Called/faxed info to Paula with [facility] and faxed info to Farrah for possible iv atb. /Spoke Shae

## 2024-12-20 LAB
ANION GAP SERPL CALCULATED.3IONS-SCNC: 8 MMOL/L (ref 9–17)
BASOPHILS # BLD: 0.03 K/UL
BASOPHILS NFR BLD: 1 % (ref 0–1)
BUN SERPL-MCNC: 31 MG/DL (ref 7–20)
CALCIUM SERPL-MCNC: 8.7 MG/DL (ref 8.3–10.6)
CHLORIDE SERPL-SCNC: 109 MMOL/L (ref 99–110)
CO2 SERPL-SCNC: 20 MMOL/L (ref 21–32)
CREAT SERPL-MCNC: 2.3 MG/DL (ref 0.8–1.3)
DATE LAST DOSE: NORMAL
DATE LAST DOSE: NORMAL
EOSINOPHIL # BLD: 0.67 K/UL
EOSINOPHILS RELATIVE PERCENT: 11 % (ref 0–3)
ERYTHROCYTE [DISTWIDTH] IN BLOOD BY AUTOMATED COUNT: 15.6 % (ref 11.7–14.9)
GFR, ESTIMATED: 28 ML/MIN/1.73M2
GLUCOSE BLD-MCNC: 107 MG/DL (ref 74–99)
GLUCOSE BLD-MCNC: 156 MG/DL (ref 74–99)
GLUCOSE BLD-MCNC: 193 MG/DL (ref 74–99)
GLUCOSE BLD-MCNC: 212 MG/DL (ref 74–99)
GLUCOSE BLD-MCNC: 81 MG/DL (ref 74–99)
GLUCOSE SERPL-MCNC: 119 MG/DL (ref 74–99)
HCT VFR BLD AUTO: 27.6 % (ref 42–52)
HGB BLD-MCNC: 8 G/DL (ref 13.5–18)
IMM GRANULOCYTES # BLD AUTO: 0.01 K/UL
IMM GRANULOCYTES NFR BLD: 0 %
LYMPHOCYTES NFR BLD: 1.35 K/UL
LYMPHOCYTES RELATIVE PERCENT: 23 % (ref 24–44)
MAGNESIUM SERPL-MCNC: 1.9 MG/DL (ref 1.8–2.4)
MCH RBC QN AUTO: 31.6 PG (ref 27–31)
MCHC RBC AUTO-ENTMCNC: 29 G/DL (ref 32–36)
MCV RBC AUTO: 109.1 FL (ref 78–100)
MONOCYTES NFR BLD: 0.51 K/UL
MONOCYTES NFR BLD: 9 % (ref 0–4)
NEUTROPHILS NFR BLD: 57 % (ref 36–66)
NEUTS SEG NFR BLD: 3.35 K/UL
PLATELET # BLD AUTO: 194 K/UL (ref 140–440)
PMV BLD AUTO: 11 FL (ref 7.5–11.1)
POTASSIUM SERPL-SCNC: 5.1 MMOL/L (ref 3.5–5.1)
RBC # BLD AUTO: 2.53 M/UL (ref 4.6–6.2)
SODIUM SERPL-SCNC: 138 MMOL/L (ref 136–145)
SURGICAL PATHOLOGY REPORT: NORMAL
TME LAST DOSE: NORMAL H
TME LAST DOSE: NORMAL H
VANCOMYCIN DOSE: NORMAL MG
VANCOMYCIN DOSE: NORMAL MG
VANCOMYCIN SERPL-MCNC: 11.8 UG/ML (ref 10–20)
VANCOMYCIN SERPL-MCNC: 17.2 UG/ML (ref 10–20)
WBC OTHER # BLD: 5.9 K/UL (ref 4–10.5)

## 2024-12-20 PROCEDURE — 36415 COLL VENOUS BLD VENIPUNCTURE: CPT

## 2024-12-20 PROCEDURE — 6370000000 HC RX 637 (ALT 250 FOR IP): Performed by: SURGERY

## 2024-12-20 PROCEDURE — 2580000003 HC RX 258: Performed by: SURGERY

## 2024-12-20 PROCEDURE — 6360000002 HC RX W HCPCS: Performed by: STUDENT IN AN ORGANIZED HEALTH CARE EDUCATION/TRAINING PROGRAM

## 2024-12-20 PROCEDURE — 6360000002 HC RX W HCPCS: Performed by: SURGERY

## 2024-12-20 PROCEDURE — 2580000003 HC RX 258: Performed by: STUDENT IN AN ORGANIZED HEALTH CARE EDUCATION/TRAINING PROGRAM

## 2024-12-20 PROCEDURE — 80202 ASSAY OF VANCOMYCIN: CPT

## 2024-12-20 PROCEDURE — 99232 SBSQ HOSP IP/OBS MODERATE 35: CPT | Performed by: SURGERY

## 2024-12-20 PROCEDURE — 85025 COMPLETE CBC W/AUTO DIFF WBC: CPT

## 2024-12-20 PROCEDURE — 97530 THERAPEUTIC ACTIVITIES: CPT

## 2024-12-20 PROCEDURE — 1200000000 HC SEMI PRIVATE

## 2024-12-20 PROCEDURE — 97163 PT EVAL HIGH COMPLEX 45 MIN: CPT

## 2024-12-20 PROCEDURE — 94761 N-INVAS EAR/PLS OXIMETRY MLT: CPT

## 2024-12-20 PROCEDURE — 83735 ASSAY OF MAGNESIUM: CPT

## 2024-12-20 PROCEDURE — 2500000003 HC RX 250 WO HCPCS: Performed by: SURGERY

## 2024-12-20 PROCEDURE — 82962 GLUCOSE BLOOD TEST: CPT

## 2024-12-20 PROCEDURE — 80048 BASIC METABOLIC PNL TOTAL CA: CPT

## 2024-12-20 PROCEDURE — 6370000000 HC RX 637 (ALT 250 FOR IP): Performed by: STUDENT IN AN ORGANIZED HEALTH CARE EDUCATION/TRAINING PROGRAM

## 2024-12-20 RX ADMIN — ASPIRIN 81 MG: 81 TABLET, CHEWABLE ORAL at 08:27

## 2024-12-20 RX ADMIN — SODIUM CHLORIDE, PRESERVATIVE FREE 10 ML: 5 INJECTION INTRAVENOUS at 20:33

## 2024-12-20 RX ADMIN — INSULIN LISPRO 1 UNITS: 100 INJECTION, SOLUTION INTRAVENOUS; SUBCUTANEOUS at 16:57

## 2024-12-20 RX ADMIN — MICONAZOLE NITRATE: 2 POWDER TOPICAL at 08:32

## 2024-12-20 RX ADMIN — HYDROCODONE BITARTRATE AND ACETAMINOPHEN 1 TABLET: 5; 325 TABLET ORAL at 13:41

## 2024-12-20 RX ADMIN — CEFEPIME 1000 MG: 1 INJECTION, POWDER, FOR SOLUTION INTRAMUSCULAR; INTRAVENOUS at 23:59

## 2024-12-20 RX ADMIN — ENOXAPARIN SODIUM 30 MG: 100 INJECTION SUBCUTANEOUS at 08:27

## 2024-12-20 RX ADMIN — MICONAZOLE NITRATE: 2 POWDER TOPICAL at 20:34

## 2024-12-20 RX ADMIN — GABAPENTIN 100 MG: 100 CAPSULE ORAL at 20:33

## 2024-12-20 RX ADMIN — INSULIN GLARGINE 5 UNITS: 100 INJECTION, SOLUTION SUBCUTANEOUS at 20:33

## 2024-12-20 RX ADMIN — SODIUM CHLORIDE, PRESERVATIVE FREE 10 ML: 5 INJECTION INTRAVENOUS at 08:28

## 2024-12-20 RX ADMIN — HYDROCODONE BITARTRATE AND ACETAMINOPHEN 1 TABLET: 5; 325 TABLET ORAL at 04:24

## 2024-12-20 RX ADMIN — HYDRALAZINE HYDROCHLORIDE 100 MG: 50 TABLET ORAL at 20:33

## 2024-12-20 RX ADMIN — HYDRALAZINE HYDROCHLORIDE 100 MG: 50 TABLET ORAL at 15:06

## 2024-12-20 RX ADMIN — GABAPENTIN 100 MG: 100 CAPSULE ORAL at 08:26

## 2024-12-20 RX ADMIN — CEFEPIME 1000 MG: 1 INJECTION, POWDER, FOR SOLUTION INTRAMUSCULAR; INTRAVENOUS at 16:28

## 2024-12-20 RX ADMIN — HYDRALAZINE HYDROCHLORIDE 100 MG: 50 TABLET ORAL at 04:24

## 2024-12-20 RX ADMIN — SODIUM CHLORIDE 25 ML: 9 INJECTION, SOLUTION INTRAVENOUS at 16:15

## 2024-12-20 RX ADMIN — SENNOSIDES 17.2 MG: 8.6 TABLET, FILM COATED ORAL at 08:27

## 2024-12-20 RX ADMIN — VANCOMYCIN HYDROCHLORIDE 1000 MG: 1 INJECTION, POWDER, LYOPHILIZED, FOR SOLUTION INTRAVENOUS at 14:50

## 2024-12-20 RX ADMIN — PANTOPRAZOLE SODIUM 40 MG: 40 TABLET, DELAYED RELEASE ORAL at 04:24

## 2024-12-20 RX ADMIN — AMLODIPINE BESYLATE 10 MG: 10 TABLET ORAL at 08:27

## 2024-12-20 RX ADMIN — LEVOTHYROXINE SODIUM 75 MCG: 0.07 TABLET ORAL at 04:24

## 2024-12-20 RX ADMIN — ATORVASTATIN CALCIUM 20 MG: 10 TABLET, FILM COATED ORAL at 20:33

## 2024-12-20 ASSESSMENT — PAIN SCALES - GENERAL
PAINLEVEL_OUTOF10: 6
PAINLEVEL_OUTOF10: 7
PAINLEVEL_OUTOF10: 4
PAINLEVEL_OUTOF10: 0
PAINLEVEL_OUTOF10: 8
PAINLEVEL_OUTOF10: 3
PAINLEVEL_OUTOF10: 7
PAINLEVEL_OUTOF10: 4

## 2024-12-20 ASSESSMENT — PAIN DESCRIPTION - ORIENTATION
ORIENTATION: LEFT

## 2024-12-20 ASSESSMENT — PAIN SCALES - WONG BAKER
WONGBAKER_NUMERICALRESPONSE: HURTS LITTLE MORE
WONGBAKER_NUMERICALRESPONSE: HURTS LITTLE MORE
WONGBAKER_NUMERICALRESPONSE: HURTS WHOLE LOT
WONGBAKER_NUMERICALRESPONSE: HURTS LITTLE MORE

## 2024-12-20 ASSESSMENT — PAIN DESCRIPTION - LOCATION
LOCATION: FOOT
LOCATION: FOOT
LOCATION: ANKLE;FOOT
LOCATION: FOOT

## 2024-12-20 ASSESSMENT — PAIN DESCRIPTION - PAIN TYPE
TYPE: SURGICAL PAIN
TYPE: ACUTE PAIN;SURGICAL PAIN
TYPE: SURGICAL PAIN

## 2024-12-20 ASSESSMENT — PAIN DESCRIPTION - FREQUENCY
FREQUENCY: INTERMITTENT
FREQUENCY: INTERMITTENT

## 2024-12-20 ASSESSMENT — PAIN DESCRIPTION - DESCRIPTORS
DESCRIPTORS: STABBING
DESCRIPTORS: STABBING
DESCRIPTORS: ACHING;TENDER
DESCRIPTORS: SORE

## 2024-12-20 ASSESSMENT — PAIN DESCRIPTION - ONSET
ONSET: ON-GOING
ONSET: ON-GOING

## 2024-12-20 ASSESSMENT — PAIN - FUNCTIONAL ASSESSMENT
PAIN_FUNCTIONAL_ASSESSMENT: PREVENTS OR INTERFERES WITH MANY ACTIVE NOT PASSIVE ACTIVITIES
PAIN_FUNCTIONAL_ASSESSMENT: PREVENTS OR INTERFERES SOME ACTIVE ACTIVITIES AND ADLS
PAIN_FUNCTIONAL_ASSESSMENT: PREVENTS OR INTERFERES WITH MANY ACTIVE NOT PASSIVE ACTIVITIES
PAIN_FUNCTIONAL_ASSESSMENT: PREVENTS OR INTERFERES SOME ACTIVE ACTIVITIES AND ADLS

## 2024-12-20 NOTE — CARE COORDINATION
Reviewed chart, discussed in IDR and nursing got pt up to chair and need a lot of assistance. Spoke with pt in room and son Axel over speaker phone about possible SNU.  Axel is going to ask pt's son Greg Lora to come up room to discuss needs/plans with pt and CM.   Pt as SNU list to review.        1425 Spoke with pt's son , they wanted [facility] but not beds available at this time. Next choice is Swing bed , referral to sent to Dr Mullins, he is awaiting therapy evals to make a determination.

## 2024-12-21 LAB
ANION GAP SERPL CALCULATED.3IONS-SCNC: 8 MMOL/L (ref 9–17)
BASOPHILS # BLD: 0.03 K/UL
BASOPHILS NFR BLD: 1 % (ref 0–1)
BUN SERPL-MCNC: 34 MG/DL (ref 7–20)
CALCIUM SERPL-MCNC: 8.5 MG/DL (ref 8.3–10.6)
CHLORIDE SERPL-SCNC: 112 MMOL/L (ref 99–110)
CO2 SERPL-SCNC: 20 MMOL/L (ref 21–32)
CREAT SERPL-MCNC: 2.5 MG/DL (ref 0.8–1.3)
CRP SERPL HS-MCNC: 26.9 MG/L (ref 0–5)
EOSINOPHIL # BLD: 0.63 K/UL
EOSINOPHILS RELATIVE PERCENT: 12 % (ref 0–3)
ERYTHROCYTE [DISTWIDTH] IN BLOOD BY AUTOMATED COUNT: 15.3 % (ref 11.7–14.9)
ERYTHROCYTE [SEDIMENTATION RATE] IN BLOOD BY WESTERGREN METHOD: 115 MM/HR (ref 0–20)
GFR, ESTIMATED: 24 ML/MIN/1.73M2
GLUCOSE BLD-MCNC: 114 MG/DL (ref 74–99)
GLUCOSE BLD-MCNC: 150 MG/DL (ref 74–99)
GLUCOSE BLD-MCNC: 164 MG/DL (ref 74–99)
GLUCOSE BLD-MCNC: 200 MG/DL (ref 74–99)
GLUCOSE SERPL-MCNC: 151 MG/DL (ref 74–99)
HCT VFR BLD AUTO: 27.1 % (ref 42–52)
HGB BLD-MCNC: 8.2 G/DL (ref 13.5–18)
IMM GRANULOCYTES # BLD AUTO: 0.01 K/UL
IMM GRANULOCYTES NFR BLD: 0 %
LYMPHOCYTES NFR BLD: 1.4 K/UL
LYMPHOCYTES RELATIVE PERCENT: 26 % (ref 24–44)
MCH RBC QN AUTO: 30.5 PG (ref 27–31)
MCHC RBC AUTO-ENTMCNC: 30.3 G/DL (ref 32–36)
MCV RBC AUTO: 100.7 FL (ref 78–100)
MONOCYTES NFR BLD: 0.54 K/UL
MONOCYTES NFR BLD: 10 % (ref 0–4)
NEUTROPHILS NFR BLD: 52 % (ref 36–66)
NEUTS SEG NFR BLD: 2.78 K/UL
PLATELET # BLD AUTO: 186 K/UL (ref 140–440)
PMV BLD AUTO: 11.1 FL (ref 7.5–11.1)
POTASSIUM SERPL-SCNC: 4.9 MMOL/L (ref 3.5–5.1)
PROCALCITONIN SERPL-MCNC: 0.3 NG/ML
RBC # BLD AUTO: 2.69 M/UL (ref 4.6–6.2)
SODIUM SERPL-SCNC: 140 MMOL/L (ref 136–145)
WBC OTHER # BLD: 5.4 K/UL (ref 4–10.5)

## 2024-12-21 PROCEDURE — 85025 COMPLETE CBC W/AUTO DIFF WBC: CPT

## 2024-12-21 PROCEDURE — 94761 N-INVAS EAR/PLS OXIMETRY MLT: CPT

## 2024-12-21 PROCEDURE — 82962 GLUCOSE BLOOD TEST: CPT

## 2024-12-21 PROCEDURE — 6370000000 HC RX 637 (ALT 250 FOR IP): Performed by: STUDENT IN AN ORGANIZED HEALTH CARE EDUCATION/TRAINING PROGRAM

## 2024-12-21 PROCEDURE — 85652 RBC SED RATE AUTOMATED: CPT

## 2024-12-21 PROCEDURE — 80048 BASIC METABOLIC PNL TOTAL CA: CPT

## 2024-12-21 PROCEDURE — 6360000002 HC RX W HCPCS: Performed by: STUDENT IN AN ORGANIZED HEALTH CARE EDUCATION/TRAINING PROGRAM

## 2024-12-21 PROCEDURE — 1200000000 HC SEMI PRIVATE

## 2024-12-21 PROCEDURE — 6370000000 HC RX 637 (ALT 250 FOR IP): Performed by: SURGERY

## 2024-12-21 PROCEDURE — 36415 COLL VENOUS BLD VENIPUNCTURE: CPT

## 2024-12-21 PROCEDURE — 86140 C-REACTIVE PROTEIN: CPT

## 2024-12-21 PROCEDURE — 2500000003 HC RX 250 WO HCPCS: Performed by: SURGERY

## 2024-12-21 PROCEDURE — 84145 PROCALCITONIN (PCT): CPT

## 2024-12-21 PROCEDURE — 6360000002 HC RX W HCPCS: Performed by: SURGERY

## 2024-12-21 PROCEDURE — 2580000003 HC RX 258: Performed by: STUDENT IN AN ORGANIZED HEALTH CARE EDUCATION/TRAINING PROGRAM

## 2024-12-21 RX ORDER — TRAMADOL HYDROCHLORIDE 50 MG/1
50 TABLET ORAL EVERY 12 HOURS PRN
Status: DISCONTINUED | OUTPATIENT
Start: 2024-12-21 | End: 2024-12-24 | Stop reason: HOSPADM

## 2024-12-21 RX ADMIN — TRAMADOL HYDROCHLORIDE 50 MG: 50 TABLET, COATED ORAL at 21:43

## 2024-12-21 RX ADMIN — MICONAZOLE NITRATE: 2 POWDER TOPICAL at 21:36

## 2024-12-21 RX ADMIN — SENNOSIDES 17.2 MG: 8.6 TABLET, FILM COATED ORAL at 09:35

## 2024-12-21 RX ADMIN — PANTOPRAZOLE SODIUM 40 MG: 40 TABLET, DELAYED RELEASE ORAL at 04:36

## 2024-12-21 RX ADMIN — ATORVASTATIN CALCIUM 20 MG: 10 TABLET, FILM COATED ORAL at 21:37

## 2024-12-21 RX ADMIN — ENOXAPARIN SODIUM 30 MG: 100 INJECTION SUBCUTANEOUS at 09:34

## 2024-12-21 RX ADMIN — GABAPENTIN 100 MG: 100 CAPSULE ORAL at 21:37

## 2024-12-21 RX ADMIN — INSULIN GLARGINE 5 UNITS: 100 INJECTION, SOLUTION SUBCUTANEOUS at 21:38

## 2024-12-21 RX ADMIN — MEROPENEM 500 MG: 500 INJECTION, POWDER, FOR SOLUTION INTRAVENOUS at 22:54

## 2024-12-21 RX ADMIN — HYDRALAZINE HYDROCHLORIDE 100 MG: 50 TABLET ORAL at 04:36

## 2024-12-21 RX ADMIN — LEVOTHYROXINE SODIUM 75 MCG: 0.07 TABLET ORAL at 04:36

## 2024-12-21 RX ADMIN — ASPIRIN 81 MG: 81 TABLET, CHEWABLE ORAL at 09:35

## 2024-12-21 RX ADMIN — HYDRALAZINE HYDROCHLORIDE 100 MG: 50 TABLET ORAL at 16:49

## 2024-12-21 RX ADMIN — HYDROCODONE BITARTRATE AND ACETAMINOPHEN 1 TABLET: 5; 325 TABLET ORAL at 00:01

## 2024-12-21 RX ADMIN — MICONAZOLE NITRATE: 2 POWDER TOPICAL at 09:35

## 2024-12-21 RX ADMIN — HYDRALAZINE HYDROCHLORIDE 100 MG: 50 TABLET ORAL at 21:37

## 2024-12-21 RX ADMIN — MEROPENEM 1000 MG: 1 INJECTION, POWDER, FOR SOLUTION INTRAVENOUS at 09:44

## 2024-12-21 RX ADMIN — SODIUM CHLORIDE, PRESERVATIVE FREE 10 ML: 5 INJECTION INTRAVENOUS at 22:55

## 2024-12-21 RX ADMIN — AMLODIPINE BESYLATE 10 MG: 10 TABLET ORAL at 09:34

## 2024-12-21 RX ADMIN — GABAPENTIN 100 MG: 100 CAPSULE ORAL at 09:35

## 2024-12-21 RX ADMIN — INSULIN LISPRO 1 UNITS: 100 INJECTION, SOLUTION INTRAVENOUS; SUBCUTANEOUS at 13:01

## 2024-12-21 ASSESSMENT — PAIN DESCRIPTION - DESCRIPTORS: DESCRIPTORS: SORE

## 2024-12-21 ASSESSMENT — PAIN SCALES - WONG BAKER
WONGBAKER_NUMERICALRESPONSE: HURTS A LITTLE BIT
WONGBAKER_NUMERICALRESPONSE: NO HURT
WONGBAKER_NUMERICALRESPONSE: NO HURT

## 2024-12-21 ASSESSMENT — PAIN DESCRIPTION - ORIENTATION
ORIENTATION: LEFT
ORIENTATION: RIGHT

## 2024-12-21 ASSESSMENT — PAIN DESCRIPTION - LOCATION
LOCATION: SHOULDER
LOCATION: FOOT

## 2024-12-21 ASSESSMENT — PAIN SCALES - GENERAL
PAINLEVEL_OUTOF10: 8
PAINLEVEL_OUTOF10: 0
PAINLEVEL_OUTOF10: 6
PAINLEVEL_OUTOF10: 3

## 2024-12-21 ASSESSMENT — PAIN DESCRIPTION - PAIN TYPE: TYPE: SURGICAL PAIN

## 2024-12-21 NOTE — CARE COORDINATION
Chart reviewed. CM reviewed patient in weekend IDR. Referral was made to swing bed, they are still reviewing at this time. CM updated by doc that the patient will need IV antx at discharge. Patient is on IV vanc+cefepime. A order for a wound vac was also faxed to [facility].

## 2024-12-22 LAB
ANION GAP SERPL CALCULATED.3IONS-SCNC: 10 MMOL/L (ref 9–17)
BASOPHILS # BLD: 0.04 K/UL
BASOPHILS NFR BLD: 1 % (ref 0–1)
BUN SERPL-MCNC: 35 MG/DL (ref 7–20)
CALCIUM SERPL-MCNC: 8.5 MG/DL (ref 8.3–10.6)
CHLORIDE SERPL-SCNC: 111 MMOL/L (ref 99–110)
CO2 SERPL-SCNC: 20 MMOL/L (ref 21–32)
CREAT SERPL-MCNC: 2.5 MG/DL (ref 0.8–1.3)
DATE LAST DOSE: NORMAL
EOSINOPHIL # BLD: 0.63 K/UL
EOSINOPHILS RELATIVE PERCENT: 9 % (ref 0–3)
ERYTHROCYTE [DISTWIDTH] IN BLOOD BY AUTOMATED COUNT: 15.2 % (ref 11.7–14.9)
GFR, ESTIMATED: 25 ML/MIN/1.73M2
GLUCOSE BLD-MCNC: 107 MG/DL (ref 74–99)
GLUCOSE BLD-MCNC: 160 MG/DL (ref 74–99)
GLUCOSE BLD-MCNC: 162 MG/DL (ref 74–99)
GLUCOSE BLD-MCNC: 247 MG/DL (ref 74–99)
GLUCOSE SERPL-MCNC: 109 MG/DL (ref 74–99)
HCT VFR BLD AUTO: 24 % (ref 42–52)
HGB BLD-MCNC: 7.3 G/DL (ref 13.5–18)
IMM GRANULOCYTES # BLD AUTO: 0.02 K/UL
IMM GRANULOCYTES NFR BLD: 0 %
LYMPHOCYTES NFR BLD: 1.42 K/UL
LYMPHOCYTES RELATIVE PERCENT: 21 % (ref 24–44)
MCH RBC QN AUTO: 30.3 PG (ref 27–31)
MCHC RBC AUTO-ENTMCNC: 30.4 G/DL (ref 32–36)
MCV RBC AUTO: 99.6 FL (ref 78–100)
MONOCYTES NFR BLD: 0.62 K/UL
MONOCYTES NFR BLD: 9 % (ref 0–4)
NEUTROPHILS NFR BLD: 61 % (ref 36–66)
NEUTS SEG NFR BLD: 4.18 K/UL
PLATELET # BLD AUTO: 191 K/UL (ref 140–440)
PMV BLD AUTO: 10.7 FL (ref 7.5–11.1)
POTASSIUM SERPL-SCNC: 4.8 MMOL/L (ref 3.5–5.1)
RBC # BLD AUTO: 2.41 M/UL (ref 4.6–6.2)
SODIUM SERPL-SCNC: 140 MMOL/L (ref 136–145)
TME LAST DOSE: NORMAL H
VANCOMYCIN DOSE: NORMAL MG
VANCOMYCIN SERPL-MCNC: 19 UG/ML (ref 10–20)
WBC OTHER # BLD: 6.9 K/UL (ref 4–10.5)

## 2024-12-22 PROCEDURE — 2500000003 HC RX 250 WO HCPCS: Performed by: SURGERY

## 2024-12-22 PROCEDURE — 6360000002 HC RX W HCPCS: Performed by: STUDENT IN AN ORGANIZED HEALTH CARE EDUCATION/TRAINING PROGRAM

## 2024-12-22 PROCEDURE — 80202 ASSAY OF VANCOMYCIN: CPT

## 2024-12-22 PROCEDURE — 6370000000 HC RX 637 (ALT 250 FOR IP): Performed by: SURGERY

## 2024-12-22 PROCEDURE — 97167 OT EVAL HIGH COMPLEX 60 MIN: CPT

## 2024-12-22 PROCEDURE — 97535 SELF CARE MNGMENT TRAINING: CPT

## 2024-12-22 PROCEDURE — 2580000003 HC RX 258: Performed by: SURGERY

## 2024-12-22 PROCEDURE — 1200000000 HC SEMI PRIVATE

## 2024-12-22 PROCEDURE — 6360000002 HC RX W HCPCS: Performed by: SURGERY

## 2024-12-22 PROCEDURE — 80048 BASIC METABOLIC PNL TOTAL CA: CPT

## 2024-12-22 PROCEDURE — 94761 N-INVAS EAR/PLS OXIMETRY MLT: CPT

## 2024-12-22 PROCEDURE — 36415 COLL VENOUS BLD VENIPUNCTURE: CPT

## 2024-12-22 PROCEDURE — 82962 GLUCOSE BLOOD TEST: CPT

## 2024-12-22 PROCEDURE — 85025 COMPLETE CBC W/AUTO DIFF WBC: CPT

## 2024-12-22 PROCEDURE — 2580000003 HC RX 258: Performed by: STUDENT IN AN ORGANIZED HEALTH CARE EDUCATION/TRAINING PROGRAM

## 2024-12-22 PROCEDURE — 6370000000 HC RX 637 (ALT 250 FOR IP): Performed by: STUDENT IN AN ORGANIZED HEALTH CARE EDUCATION/TRAINING PROGRAM

## 2024-12-22 RX ADMIN — SODIUM CHLORIDE, PRESERVATIVE FREE 10 ML: 5 INJECTION INTRAVENOUS at 21:13

## 2024-12-22 RX ADMIN — PANTOPRAZOLE SODIUM 40 MG: 40 TABLET, DELAYED RELEASE ORAL at 05:37

## 2024-12-22 RX ADMIN — INSULIN GLARGINE 5 UNITS: 100 INJECTION, SOLUTION SUBCUTANEOUS at 20:55

## 2024-12-22 RX ADMIN — MICONAZOLE NITRATE: 2 POWDER TOPICAL at 21:12

## 2024-12-22 RX ADMIN — HYDRALAZINE HYDROCHLORIDE 100 MG: 50 TABLET ORAL at 21:13

## 2024-12-22 RX ADMIN — HYDRALAZINE HYDROCHLORIDE 100 MG: 50 TABLET ORAL at 05:36

## 2024-12-22 RX ADMIN — SENNOSIDES 17.2 MG: 8.6 TABLET, FILM COATED ORAL at 10:20

## 2024-12-22 RX ADMIN — MEROPENEM 500 MG: 500 INJECTION, POWDER, FOR SOLUTION INTRAVENOUS at 10:16

## 2024-12-22 RX ADMIN — ENOXAPARIN SODIUM 30 MG: 100 INJECTION SUBCUTANEOUS at 10:19

## 2024-12-22 RX ADMIN — ATORVASTATIN CALCIUM 20 MG: 10 TABLET, FILM COATED ORAL at 20:54

## 2024-12-22 RX ADMIN — MICONAZOLE NITRATE: 2 POWDER TOPICAL at 10:20

## 2024-12-22 RX ADMIN — GABAPENTIN 100 MG: 100 CAPSULE ORAL at 10:19

## 2024-12-22 RX ADMIN — LEVOTHYROXINE SODIUM 75 MCG: 0.07 TABLET ORAL at 05:37

## 2024-12-22 RX ADMIN — ASPIRIN 81 MG: 81 TABLET, CHEWABLE ORAL at 10:19

## 2024-12-22 RX ADMIN — AMLODIPINE BESYLATE 10 MG: 10 TABLET ORAL at 10:19

## 2024-12-22 RX ADMIN — MEROPENEM 500 MG: 500 INJECTION, POWDER, FOR SOLUTION INTRAVENOUS at 23:08

## 2024-12-22 RX ADMIN — VANCOMYCIN HYDROCHLORIDE 1000 MG: 1 INJECTION, POWDER, LYOPHILIZED, FOR SOLUTION INTRAVENOUS at 21:06

## 2024-12-22 RX ADMIN — SODIUM CHLORIDE: 9 INJECTION, SOLUTION INTRAVENOUS at 23:07

## 2024-12-22 RX ADMIN — HYDRALAZINE HYDROCHLORIDE 100 MG: 50 TABLET ORAL at 15:31

## 2024-12-22 RX ADMIN — GABAPENTIN 100 MG: 100 CAPSULE ORAL at 20:54

## 2024-12-22 RX ADMIN — SODIUM CHLORIDE, PRESERVATIVE FREE 10 ML: 5 INJECTION INTRAVENOUS at 10:13

## 2024-12-22 RX ADMIN — INSULIN LISPRO 1 UNITS: 100 INJECTION, SOLUTION INTRAVENOUS; SUBCUTANEOUS at 20:55

## 2024-12-22 ASSESSMENT — PAIN SCALES - GENERAL
PAINLEVEL_OUTOF10: 0
PAINLEVEL_OUTOF10: 5
PAINLEVEL_OUTOF10: 5
PAINLEVEL_OUTOF10: 0

## 2024-12-22 ASSESSMENT — PAIN DESCRIPTION - PAIN TYPE: TYPE: SURGICAL PAIN

## 2024-12-22 ASSESSMENT — PAIN - FUNCTIONAL ASSESSMENT: PAIN_FUNCTIONAL_ASSESSMENT: PREVENTS OR INTERFERES SOME ACTIVE ACTIVITIES AND ADLS

## 2024-12-22 ASSESSMENT — PAIN DESCRIPTION - ONSET: ONSET: ON-GOING

## 2024-12-22 ASSESSMENT — PAIN DESCRIPTION - LOCATION: LOCATION: FOOT

## 2024-12-22 ASSESSMENT — PAIN DESCRIPTION - ORIENTATION: ORIENTATION: LEFT

## 2024-12-22 ASSESSMENT — PAIN DESCRIPTION - FREQUENCY: FREQUENCY: INTERMITTENT

## 2024-12-22 ASSESSMENT — PAIN DESCRIPTION - DESCRIPTORS: DESCRIPTORS: DISCOMFORT

## 2024-12-23 LAB
ANION GAP SERPL CALCULATED.3IONS-SCNC: 10 MMOL/L (ref 9–17)
BASOPHILS # BLD: 0.03 K/UL
BASOPHILS NFR BLD: 1 % (ref 0–1)
BUN SERPL-MCNC: 42 MG/DL (ref 7–20)
CALCIUM SERPL-MCNC: 8.9 MG/DL (ref 8.3–10.6)
CHLORIDE SERPL-SCNC: 111 MMOL/L (ref 99–110)
CO2 SERPL-SCNC: 21 MMOL/L (ref 21–32)
CREAT SERPL-MCNC: 2.4 MG/DL (ref 0.8–1.3)
EOSINOPHIL # BLD: 0.61 K/UL
EOSINOPHILS RELATIVE PERCENT: 9 % (ref 0–3)
ERYTHROCYTE [DISTWIDTH] IN BLOOD BY AUTOMATED COUNT: 15.1 % (ref 11.7–14.9)
GFR, ESTIMATED: 26 ML/MIN/1.73M2
GLUCOSE BLD-MCNC: 123 MG/DL (ref 74–99)
GLUCOSE BLD-MCNC: 136 MG/DL (ref 74–99)
GLUCOSE BLD-MCNC: 186 MG/DL (ref 74–99)
GLUCOSE BLD-MCNC: 229 MG/DL (ref 74–99)
GLUCOSE BLD-MCNC: 248 MG/DL (ref 74–99)
GLUCOSE SERPL-MCNC: 192 MG/DL (ref 74–99)
HCT VFR BLD AUTO: 26.2 % (ref 42–52)
HGB BLD-MCNC: 8 G/DL (ref 13.5–18)
IMM GRANULOCYTES # BLD AUTO: 0.02 K/UL
IMM GRANULOCYTES NFR BLD: 0 %
LYMPHOCYTES NFR BLD: 1.33 K/UL
LYMPHOCYTES RELATIVE PERCENT: 20 % (ref 24–44)
MCH RBC QN AUTO: 30.7 PG (ref 27–31)
MCHC RBC AUTO-ENTMCNC: 30.5 G/DL (ref 32–36)
MCV RBC AUTO: 100.4 FL (ref 78–100)
MICROORGANISM SPEC CULT: NORMAL
MICROORGANISM SPEC CULT: NORMAL
MONOCYTES NFR BLD: 0.53 K/UL
MONOCYTES NFR BLD: 8 % (ref 0–4)
NEUTROPHILS NFR BLD: 62 % (ref 36–66)
NEUTS SEG NFR BLD: 4.08 K/UL
PLATELET # BLD AUTO: 203 K/UL (ref 140–440)
PMV BLD AUTO: 11 FL (ref 7.5–11.1)
POTASSIUM SERPL-SCNC: 4.9 MMOL/L (ref 3.5–5.1)
RBC # BLD AUTO: 2.61 M/UL (ref 4.6–6.2)
SERVICE CMNT-IMP: NORMAL
SERVICE CMNT-IMP: NORMAL
SODIUM SERPL-SCNC: 141 MMOL/L (ref 136–145)
SPECIMEN DESCRIPTION: NORMAL
SPECIMEN DESCRIPTION: NORMAL
WBC OTHER # BLD: 6.6 K/UL (ref 4–10.5)

## 2024-12-23 PROCEDURE — 2580000003 HC RX 258: Performed by: STUDENT IN AN ORGANIZED HEALTH CARE EDUCATION/TRAINING PROGRAM

## 2024-12-23 PROCEDURE — 94761 N-INVAS EAR/PLS OXIMETRY MLT: CPT

## 2024-12-23 PROCEDURE — 6370000000 HC RX 637 (ALT 250 FOR IP): Performed by: STUDENT IN AN ORGANIZED HEALTH CARE EDUCATION/TRAINING PROGRAM

## 2024-12-23 PROCEDURE — 85025 COMPLETE CBC W/AUTO DIFF WBC: CPT

## 2024-12-23 PROCEDURE — 99232 SBSQ HOSP IP/OBS MODERATE 35: CPT | Performed by: SURGERY

## 2024-12-23 PROCEDURE — 80048 BASIC METABOLIC PNL TOTAL CA: CPT

## 2024-12-23 PROCEDURE — 6360000002 HC RX W HCPCS: Performed by: STUDENT IN AN ORGANIZED HEALTH CARE EDUCATION/TRAINING PROGRAM

## 2024-12-23 PROCEDURE — 2500000003 HC RX 250 WO HCPCS: Performed by: SURGERY

## 2024-12-23 PROCEDURE — 36415 COLL VENOUS BLD VENIPUNCTURE: CPT

## 2024-12-23 PROCEDURE — 6360000002 HC RX W HCPCS: Performed by: SURGERY

## 2024-12-23 PROCEDURE — 1200000000 HC SEMI PRIVATE

## 2024-12-23 PROCEDURE — 6370000000 HC RX 637 (ALT 250 FOR IP): Performed by: SURGERY

## 2024-12-23 PROCEDURE — 82962 GLUCOSE BLOOD TEST: CPT

## 2024-12-23 PROCEDURE — 2580000003 HC RX 258: Performed by: SURGERY

## 2024-12-23 PROCEDURE — 97605 NEG PRS WND THER DME<=50SQCM: CPT

## 2024-12-23 RX ORDER — ISOSORBIDE MONONITRATE 30 MG/1
30 TABLET, EXTENDED RELEASE ORAL DAILY
Status: DISCONTINUED | OUTPATIENT
Start: 2024-12-23 | End: 2024-12-23

## 2024-12-23 RX ADMIN — ASPIRIN 81 MG: 81 TABLET, CHEWABLE ORAL at 09:51

## 2024-12-23 RX ADMIN — INSULIN GLARGINE 5 UNITS: 100 INJECTION, SOLUTION SUBCUTANEOUS at 19:59

## 2024-12-23 RX ADMIN — AMLODIPINE BESYLATE 10 MG: 10 TABLET ORAL at 09:51

## 2024-12-23 RX ADMIN — GABAPENTIN 100 MG: 100 CAPSULE ORAL at 09:51

## 2024-12-23 RX ADMIN — MICONAZOLE NITRATE: 2 POWDER TOPICAL at 20:05

## 2024-12-23 RX ADMIN — MEROPENEM 500 MG: 500 INJECTION, POWDER, FOR SOLUTION INTRAVENOUS at 20:05

## 2024-12-23 RX ADMIN — HYDRALAZINE HYDROCHLORIDE 100 MG: 50 TABLET ORAL at 12:14

## 2024-12-23 RX ADMIN — TRAMADOL HYDROCHLORIDE 50 MG: 50 TABLET, COATED ORAL at 19:57

## 2024-12-23 RX ADMIN — LEVOTHYROXINE SODIUM 75 MCG: 0.07 TABLET ORAL at 06:04

## 2024-12-23 RX ADMIN — INSULIN LISPRO 1 UNITS: 100 INJECTION, SOLUTION INTRAVENOUS; SUBCUTANEOUS at 20:00

## 2024-12-23 RX ADMIN — PANTOPRAZOLE SODIUM 40 MG: 40 TABLET, DELAYED RELEASE ORAL at 06:04

## 2024-12-23 RX ADMIN — ENOXAPARIN SODIUM 30 MG: 100 INJECTION SUBCUTANEOUS at 09:52

## 2024-12-23 RX ADMIN — MEROPENEM 500 MG: 500 INJECTION, POWDER, FOR SOLUTION INTRAVENOUS at 10:02

## 2024-12-23 RX ADMIN — ATORVASTATIN CALCIUM 20 MG: 10 TABLET, FILM COATED ORAL at 19:57

## 2024-12-23 RX ADMIN — MICONAZOLE NITRATE: 2 POWDER TOPICAL at 10:03

## 2024-12-23 RX ADMIN — SODIUM CHLORIDE, PRESERVATIVE FREE 10 ML: 5 INJECTION INTRAVENOUS at 19:59

## 2024-12-23 RX ADMIN — GABAPENTIN 100 MG: 100 CAPSULE ORAL at 19:57

## 2024-12-23 RX ADMIN — SENNOSIDES 17.2 MG: 8.6 TABLET, FILM COATED ORAL at 09:51

## 2024-12-23 RX ADMIN — SODIUM CHLORIDE, PRESERVATIVE FREE 10 ML: 5 INJECTION INTRAVENOUS at 09:51

## 2024-12-23 RX ADMIN — HYDRALAZINE HYDROCHLORIDE 100 MG: 50 TABLET ORAL at 06:04

## 2024-12-23 RX ADMIN — ACETAMINOPHEN 650 MG: 325 TABLET ORAL at 21:57

## 2024-12-23 RX ADMIN — INSULIN LISPRO 2 UNITS: 100 INJECTION, SOLUTION INTRAVENOUS; SUBCUTANEOUS at 12:18

## 2024-12-23 RX ADMIN — INSULIN LISPRO 1 UNITS: 100 INJECTION, SOLUTION INTRAVENOUS; SUBCUTANEOUS at 17:44

## 2024-12-23 RX ADMIN — HYDRALAZINE HYDROCHLORIDE 100 MG: 50 TABLET ORAL at 20:00

## 2024-12-23 ASSESSMENT — PAIN SCALES - GENERAL
PAINLEVEL_OUTOF10: 7
PAINLEVEL_OUTOF10: 0
PAINLEVEL_OUTOF10: 6
PAINLEVEL_OUTOF10: 0
PAINLEVEL_OUTOF10: 0
PAINLEVEL_OUTOF10: 6
PAINLEVEL_OUTOF10: 7
PAINLEVEL_OUTOF10: 0

## 2024-12-23 ASSESSMENT — PAIN DESCRIPTION - PAIN TYPE
TYPE: SURGICAL PAIN
TYPE: SURGICAL PAIN

## 2024-12-23 ASSESSMENT — PAIN DESCRIPTION - ORIENTATION: ORIENTATION: LEFT

## 2024-12-23 ASSESSMENT — PAIN DESCRIPTION - LOCATION
LOCATION: FOOT
LOCATION: FOOT

## 2024-12-23 ASSESSMENT — PAIN DESCRIPTION - DESCRIPTORS
DESCRIPTORS: ACHING;THROBBING
DESCRIPTORS: PRESSURE

## 2024-12-23 ASSESSMENT — PAIN DESCRIPTION - ONSET
ONSET: ON-GOING
ONSET: ON-GOING

## 2024-12-23 ASSESSMENT — PAIN DESCRIPTION - FREQUENCY: FREQUENCY: INTERMITTENT

## 2024-12-23 NOTE — CARE COORDINATION
Reviewed chart, discussed in IDR, Swing bed can not take pt.  Spoke pt's son Greg Lora, next choice is [facility] or [facility]. Referral called to Shae in admission for [facility].        1255 Spoke with Shae they can take pt, they are ordering a VAC for pt, they will be able to take him tomorrow after VAC arrives.  Shae spoke with family and they are in agreement.

## 2024-12-23 NOTE — DISCHARGE INSTR - COC
Continuity of Care Form    Patient Name: Greg Easton   :  1940  MRN:  [id number]    Admit date:  2024  Discharge date:  24    Code Status Order: Full Code   Advance Directives:   Advance Care Flowsheet Documentation        Date/Time Healthcare Directive Type of Healthcare Directive Copy in Chart Healthcare Agent Appointed Healthcare Agent's Name Healthcare Agent's Phone Number    24 1626 No, patient does not have an advance directive for healthcare treatment  --  --  --  --  --                     Admitting Physician:  Lc De Paz MD  PCP: Jacobo Zazueta MD    Discharging Nurse: Niurka Spears  Discharging Hospital Unit/Room#: 4117/4117-A  Discharging Unit Phone Number: [phone]    Emergency Contact:   Extended Emergency Contact Information  Primary Emergency Contact: Jon Easton  Home Phone: [phone]  Mobile Phone: [phone]  Relation: Child  Preferred language: English   needed? No  Secondary Emergency Contact: Axel Easton  Home Phone: [phone]  Relation: Child    Past Surgical History:  Past Surgical History:   Procedure Laterality Date    BLADDER SURGERY      BLADDER SURGERY Left 2022    CYSTOSCOPY LEFT STENT EXCHANGE performed by Bal Mckeon MD at [facility] OR    CHANGE OF BLADDER TUBE,COMPLICATED  2024    COLON SURGERY      CYSTOSCOPY Left 2022    LEFT CYSTOSCOPY URETERAL STENT EXCHANGE AND SUPRABUPIC PACE CATHETER EXCHANGE performed by Mirella Wilkins MD at [facility] OR    CYSTOSCOPY Left 10/31/2023    CYSTOSCOPY, LEFT URETERAL STENT EXCHANGE, SUPRAPUBIC TUBE EXCHANGE performed by Mirella Wilkins MD at [facility] OR    CYSTOSCOPY Left 2024    CYSTOSCOPY URETERAL STENT EXCHANGE performed by Mirella Wilkins MD at [facility] OR    CYSTOSCOPY Left 12/10/2024    LEFT CYSTOSCOPY URETERAL STENT EXCHANGE performed by Mirella Wilkins MD at [facility] OR    FOOT DEBRIDEMENT Left 2024    FOOT DEBRIDEMENT INCISION AND DRAINAGE

## 2024-12-24 VITALS
SYSTOLIC BLOOD PRESSURE: 144 MMHG | DIASTOLIC BLOOD PRESSURE: 69 MMHG | TEMPERATURE: 98 F | OXYGEN SATURATION: 96 % | WEIGHT: 187.83 LBS | BODY MASS INDEX: 28.47 KG/M2 | RESPIRATION RATE: 18 BRPM | HEART RATE: 72 BPM | HEIGHT: 68 IN

## 2024-12-24 LAB
ANION GAP SERPL CALCULATED.3IONS-SCNC: 10 MMOL/L (ref 9–17)
ANION GAP SERPL CALCULATED.3IONS-SCNC: 11 MMOL/L (ref 9–17)
BASOPHILS # BLD: 0.04 K/UL
BASOPHILS NFR BLD: 1 % (ref 0–1)
BUN SERPL-MCNC: 60 MG/DL (ref 7–20)
BUN SERPL-MCNC: 61 MG/DL (ref 7–20)
CALCIUM SERPL-MCNC: 8.6 MG/DL (ref 8.3–10.6)
CALCIUM SERPL-MCNC: 8.8 MG/DL (ref 8.3–10.6)
CHLORIDE SERPL-SCNC: 108 MMOL/L (ref 99–110)
CHLORIDE SERPL-SCNC: 110 MMOL/L (ref 99–110)
CO2 SERPL-SCNC: 18 MMOL/L (ref 21–32)
CO2 SERPL-SCNC: 20 MMOL/L (ref 21–32)
CREAT SERPL-MCNC: 2.7 MG/DL (ref 0.8–1.3)
CREAT SERPL-MCNC: 2.8 MG/DL (ref 0.8–1.3)
DATE LAST DOSE: NORMAL
EOSINOPHIL # BLD: 0.58 K/UL
EOSINOPHILS RELATIVE PERCENT: 8 % (ref 0–3)
ERYTHROCYTE [DISTWIDTH] IN BLOOD BY AUTOMATED COUNT: 15.1 % (ref 11.7–14.9)
GFR, ESTIMATED: 22 ML/MIN/1.73M2
GFR, ESTIMATED: 23 ML/MIN/1.73M2
GLUCOSE BLD-MCNC: 133 MG/DL (ref 74–99)
GLUCOSE BLD-MCNC: 133 MG/DL (ref 74–99)
GLUCOSE BLD-MCNC: 140 MG/DL (ref 74–99)
GLUCOSE SERPL-MCNC: 117 MG/DL (ref 74–99)
GLUCOSE SERPL-MCNC: 191 MG/DL (ref 74–99)
HCT VFR BLD AUTO: 23.6 % (ref 42–52)
HGB BLD-MCNC: 7.3 G/DL (ref 13.5–18)
IMM GRANULOCYTES # BLD AUTO: 0.02 K/UL
IMM GRANULOCYTES NFR BLD: 0 %
LYMPHOCYTES NFR BLD: 1.41 K/UL
LYMPHOCYTES RELATIVE PERCENT: 19 % (ref 24–44)
MCH RBC QN AUTO: 30.7 PG (ref 27–31)
MCHC RBC AUTO-ENTMCNC: 30.9 G/DL (ref 32–36)
MCV RBC AUTO: 99.2 FL (ref 78–100)
MICROORGANISM SPEC CULT: ABNORMAL
MICROORGANISM/AGENT SPEC: ABNORMAL
MONOCYTES NFR BLD: 0.74 K/UL
MONOCYTES NFR BLD: 10 % (ref 0–4)
NEUTROPHILS NFR BLD: 63 % (ref 36–66)
NEUTS SEG NFR BLD: 4.79 K/UL
PLATELET # BLD AUTO: 214 K/UL (ref 140–440)
PMV BLD AUTO: 11 FL (ref 7.5–11.1)
POTASSIUM SERPL-SCNC: 4.9 MMOL/L (ref 3.5–5.1)
POTASSIUM SERPL-SCNC: 5.2 MMOL/L (ref 3.5–5.1)
POTASSIUM SERPL-SCNC: 5.3 MMOL/L (ref 3.5–5.1)
RBC # BLD AUTO: 2.38 M/UL (ref 4.6–6.2)
SODIUM SERPL-SCNC: 137 MMOL/L (ref 136–145)
SODIUM SERPL-SCNC: 140 MMOL/L (ref 136–145)
SPECIMEN DESCRIPTION: ABNORMAL
TME LAST DOSE: NORMAL H
VANCOMYCIN DOSE: NORMAL MG
VANCOMYCIN SERPL-MCNC: 18.1 UG/ML (ref 10–20)
WBC OTHER # BLD: 7.6 K/UL (ref 4–10.5)

## 2024-12-24 PROCEDURE — 2500000003 HC RX 250 WO HCPCS: Performed by: SURGERY

## 2024-12-24 PROCEDURE — 6370000000 HC RX 637 (ALT 250 FOR IP): Performed by: SURGERY

## 2024-12-24 PROCEDURE — 36415 COLL VENOUS BLD VENIPUNCTURE: CPT

## 2024-12-24 PROCEDURE — 82962 GLUCOSE BLOOD TEST: CPT

## 2024-12-24 PROCEDURE — 84132 ASSAY OF SERUM POTASSIUM: CPT

## 2024-12-24 PROCEDURE — 94761 N-INVAS EAR/PLS OXIMETRY MLT: CPT

## 2024-12-24 PROCEDURE — 80048 BASIC METABOLIC PNL TOTAL CA: CPT

## 2024-12-24 PROCEDURE — 6360000002 HC RX W HCPCS: Performed by: SURGERY

## 2024-12-24 PROCEDURE — 80202 ASSAY OF VANCOMYCIN: CPT

## 2024-12-24 PROCEDURE — 6360000002 HC RX W HCPCS: Performed by: STUDENT IN AN ORGANIZED HEALTH CARE EDUCATION/TRAINING PROGRAM

## 2024-12-24 PROCEDURE — 2580000003 HC RX 258: Performed by: STUDENT IN AN ORGANIZED HEALTH CARE EDUCATION/TRAINING PROGRAM

## 2024-12-24 PROCEDURE — 85025 COMPLETE CBC W/AUTO DIFF WBC: CPT

## 2024-12-24 PROCEDURE — 6370000000 HC RX 637 (ALT 250 FOR IP): Performed by: STUDENT IN AN ORGANIZED HEALTH CARE EDUCATION/TRAINING PROGRAM

## 2024-12-24 RX ORDER — SODIUM CHLORIDE 0.9 % (FLUSH) 0.9 %
5-40 SYRINGE (ML) INJECTION EVERY 12 HOURS SCHEDULED
Status: DISCONTINUED | OUTPATIENT
Start: 2024-12-24 | End: 2024-12-24 | Stop reason: HOSPADM

## 2024-12-24 RX ORDER — CIPROFLOXACIN 500 MG/1
500 TABLET, FILM COATED ORAL
Qty: 10 TABLET | Refills: 0 | Status: SHIPPED | OUTPATIENT
Start: 2024-12-25 | End: 2025-01-04

## 2024-12-24 RX ORDER — AMOXICILLIN AND CLAVULANATE POTASSIUM 500; 125 MG/1; MG/1
1 TABLET, FILM COATED ORAL EVERY 12 HOURS SCHEDULED
Qty: 20 TABLET | Refills: 0 | Status: SHIPPED | OUTPATIENT
Start: 2024-12-24 | End: 2025-01-03

## 2024-12-24 RX ORDER — TRAMADOL HYDROCHLORIDE 50 MG/1
50 TABLET ORAL EVERY 12 HOURS PRN
Qty: 6 TABLET | Refills: 0 | Status: SHIPPED | OUTPATIENT
Start: 2024-12-24 | End: 2024-12-27

## 2024-12-24 RX ORDER — LACTOBACILLUS RHAMNOSUS GG 10B CELL
1 CAPSULE ORAL
Qty: 7 CAPSULE | Refills: 0 | Status: SHIPPED | OUTPATIENT
Start: 2024-12-25 | End: 2024-12-24

## 2024-12-24 RX ORDER — SODIUM CHLORIDE 9 MG/ML
INJECTION, SOLUTION INTRAVENOUS PRN
Status: DISCONTINUED | OUTPATIENT
Start: 2024-12-24 | End: 2024-12-24 | Stop reason: HOSPADM

## 2024-12-24 RX ORDER — LIDOCAINE HYDROCHLORIDE 20 MG/ML
100 INJECTION, SOLUTION INFILTRATION; PERINEURAL ONCE
Status: DISCONTINUED | OUTPATIENT
Start: 2024-12-24 | End: 2024-12-24 | Stop reason: HOSPADM

## 2024-12-24 RX ORDER — LACTOBACILLUS RHAMNOSUS GG 10B CELL
1 CAPSULE ORAL
Status: DISCONTINUED | OUTPATIENT
Start: 2024-12-25 | End: 2024-12-24 | Stop reason: HOSPADM

## 2024-12-24 RX ORDER — AMOXICILLIN AND CLAVULANATE POTASSIUM 500; 125 MG/1; MG/1
1 TABLET, FILM COATED ORAL EVERY 12 HOURS SCHEDULED
Status: DISCONTINUED | OUTPATIENT
Start: 2024-12-24 | End: 2024-12-24 | Stop reason: HOSPADM

## 2024-12-24 RX ORDER — SODIUM CHLORIDE 0.9 % (FLUSH) 0.9 %
5-40 SYRINGE (ML) INJECTION PRN
Status: DISCONTINUED | OUTPATIENT
Start: 2024-12-24 | End: 2024-12-24 | Stop reason: HOSPADM

## 2024-12-24 RX ORDER — CIPROFLOXACIN 500 MG/1
500 TABLET, FILM COATED ORAL
Status: DISCONTINUED | OUTPATIENT
Start: 2024-12-25 | End: 2024-12-24 | Stop reason: HOSPADM

## 2024-12-24 RX ORDER — LACTOBACILLUS RHAMNOSUS GG 10B CELL
1 CAPSULE ORAL
Qty: 7 CAPSULE | Refills: 0 | Status: SHIPPED | OUTPATIENT
Start: 2024-12-25 | End: 2025-01-01

## 2024-12-24 RX ADMIN — AMLODIPINE BESYLATE 10 MG: 10 TABLET ORAL at 09:28

## 2024-12-24 RX ADMIN — MEROPENEM 500 MG: 500 INJECTION, POWDER, FOR SOLUTION INTRAVENOUS at 09:37

## 2024-12-24 RX ADMIN — HYDRALAZINE HYDROCHLORIDE 100 MG: 50 TABLET ORAL at 04:29

## 2024-12-24 RX ADMIN — SENNOSIDES 17.2 MG: 8.6 TABLET, FILM COATED ORAL at 09:28

## 2024-12-24 RX ADMIN — SODIUM CHLORIDE, PRESERVATIVE FREE 10 ML: 5 INJECTION INTRAVENOUS at 09:29

## 2024-12-24 RX ADMIN — TRAMADOL HYDROCHLORIDE 50 MG: 50 TABLET, COATED ORAL at 14:41

## 2024-12-24 RX ADMIN — LEVOTHYROXINE SODIUM 75 MCG: 0.07 TABLET ORAL at 05:55

## 2024-12-24 RX ADMIN — PANTOPRAZOLE SODIUM 40 MG: 40 TABLET, DELAYED RELEASE ORAL at 05:55

## 2024-12-24 RX ADMIN — HYDRALAZINE HYDROCHLORIDE 100 MG: 50 TABLET ORAL at 13:03

## 2024-12-24 RX ADMIN — ACETAMINOPHEN 650 MG: 325 TABLET ORAL at 04:28

## 2024-12-24 RX ADMIN — ASPIRIN 81 MG: 81 TABLET, CHEWABLE ORAL at 09:28

## 2024-12-24 RX ADMIN — MICONAZOLE NITRATE: 2 POWDER TOPICAL at 09:37

## 2024-12-24 RX ADMIN — ENOXAPARIN SODIUM 30 MG: 100 INJECTION SUBCUTANEOUS at 09:28

## 2024-12-24 RX ADMIN — GABAPENTIN 100 MG: 100 CAPSULE ORAL at 09:28

## 2024-12-24 RX ADMIN — SODIUM ZIRCONIUM CYCLOSILICATE 10 G: 10 POWDER, FOR SUSPENSION ORAL at 14:25

## 2024-12-24 ASSESSMENT — PAIN DESCRIPTION - LOCATION
LOCATION: FOOT
LOCATION: FOOT
LOCATION: SHOULDER

## 2024-12-24 ASSESSMENT — PAIN DESCRIPTION - ORIENTATION
ORIENTATION: LEFT
ORIENTATION: RIGHT
ORIENTATION: LEFT

## 2024-12-24 ASSESSMENT — PAIN SCALES - GENERAL
PAINLEVEL_OUTOF10: 6
PAINLEVEL_OUTOF10: 5
PAINLEVEL_OUTOF10: 1
PAINLEVEL_OUTOF10: 7
PAINLEVEL_OUTOF10: 6

## 2024-12-24 ASSESSMENT — PAIN DESCRIPTION - DESCRIPTORS
DESCRIPTORS: DISCOMFORT
DESCRIPTORS: ACHING;DISCOMFORT
DESCRIPTORS: PRESSURE;SHOOTING;THROBBING

## 2024-12-24 ASSESSMENT — PAIN SCALES - WONG BAKER: WONGBAKER_NUMERICALRESPONSE: NO HURT

## 2024-12-24 NOTE — DISCHARGE INSTR - DIET

## 2024-12-24 NOTE — DISCHARGE SUMMARY
Discharge Summary    Name:  Greg Easton /Age/Sex: 1940  (84 y.o. male)   MRN & CSN:  [id number] & [id number] Admission Date/Time: 2024  1:57 PM  Discharge Date/Time: 2024  8:15 PM   Attending:  No att. providers found Discharging Physician: Lc De Paz MD     Hospital Course:   Greg Easton is a 84 y.o.  male  who presents with Decubitus ulcer    HPI  \"Greg Easton is a 84 y.o. male who presents with left heel ulcers.  Patient was sent by podiatry office at the depth of the wound needs deep enough that the patient would benefit from assessment with MRI and further imagings.  Patient was vitally stable with mild hypertension of around 178 when the patient was brought to the hospital.  Denies any nausea vomiting diarrhea constipation dizziness lightheaded leg pain leg swelling chest pain shortness of her orthopnea and PND \"       The following problems have been addressed during this hospitalization.  infected left heel decubitus ulcer.    s/p excisional debridement left heel ulcer 2024  SIRS 0/4. Sent by podiatry office to ED   Surgical culture 2024 growing ESBL E. Coli, Proteus, MRSA, Corynebacterium striatum, Bacteroides fragilis and Pseudomonas aeruginosa with sensitivities available.   Doppler studies ordered no evidence of stenosis.   - was on IV abx vanc+merrem 7 days; discharged on Augmentin and ciprofloxacin per discussion w ID  - general surgery consulted and following; pt to follow up w podiatry outpatient  - wound vac management  - consulted ID  ; initially recommended Bactrim and Cipro ; but given concern with CKD and hyperkalemia agreed to discharge pt on Augmentin and cipro 7 more days       Benign essential hypertension   on amlodipine 10 mg oral daily along with hydralazine home dose 100 mg every 8 hours scheduled  BP remains elevated likely 2/2 pain  - resume home meds  - pain control     Rash around the groin miconazole powder to be started    CKD, mild  show

## 2024-12-24 NOTE — PROGRESS NOTES
Pharmacist Review and Automatic Dose Adjustment of Prophylactic Enoxaparin         The reviewing pharmacist has made an adjustment to the ordered enoxaparin dose or converted to UFH per the approved [facility] protocol and table as identified below.        Greg Easton is a 84 y.o. male.     No results for input(s): \"CREATININE\" in the last 72 hours.    Estimated Creatinine Clearance: 21 mL/min (A) (based on SCr of 2.4 mg/dL (H)).    No results for input(s): \"HGB\", \"HCT\", \"PLT\" in the last 72 hours.  No results for input(s): \"INR\" in the last 72 hours.    Height:   Ht Readings from Last 1 Encounters:   12/17/24 1.73 m (5' 8.11\")     Weight:  Wt Readings from Last 1 Encounters:   12/17/24 75.3 kg (166 lb)               Plan: Based upon the patient's weight and renal function    Ordered: Enoxaparin 40mg SUBQ Daily    Changed/converted to    New Order: Enoxaparin 30mg SUBQ Daily      Thank you,  Maira Winston, [facility]  12/17/2024, 2:58 PM   
    V2.0  [facility] Hospitalist Progress Note      Name:  Greg Easton /Age/Sex: 1940  (84 y.o. male)   MRN & CSN:  [id number] & [id number] Encounter Date/Time: 2024 4:11 PM EST    Location:  [address] [place] [address]-A PCP: Jacobo Zazueta MD       Hospital Day: 5      Subjective:     Chief Complaint:  left heel pain     Pt states his pain is under control with current pain regimen .  ARU has no room, referred to swing bed but denied.     Assessment and Plan:   infected left heel decubitus ulcer.    SIRS 0/4. Sent by podiatry office podiatry   Surgical culture 2024 growing ESBL, Proteus, MRSA, Corynebacterium striatum, Bacteroides fragilis.   - was on vanc+cefepime; changed to merrem  - general surgery consulted and following  - f/u surgical cultures  - wound vac management    Diabetes mellitus type 2   On home Lantus 5U QHS.  - c/w Lantus 5U QHS  - LDSSI  - hypoglycemia protocol    Hypothyroidism c/w levothyroxine    Rash around the groin miconazole powder to be started    Chronic GERD on PPI    Benign essential hypertension   on amlodipine 10 mg oral daily along with hydralazine home dose 100 mg every 8 hours scheduled  - resume home meds    Personally reviewed Lab Studies and Imaging     Drugs that require monitoring for toxicity include vanc and the method of monitoring was renal      Diet ADULT DIET; Regular; 4 carb choices (60 gm/meal)  ADULT ORAL NUTRITION SUPPLEMENT; Breakfast, Lunch, Dinner; Low Calorie/High Protein Oral Supplement  ADULT ORAL NUTRITION SUPPLEMENT; Lunch, Dinner; Wound Healing Oral Supplement   DVT Prophylaxis [x] Lovenox, []  Heparin, [] SCDs, [] Ambulation,  [] Eliquis, [] Xarelto  [] Coumadin   Code Status Full Code   Disposition From: home  Expected Disposition: home  Estimated Date of Discharge: 1-2 days  Patient requires continued admission due to IV abx, placement   Surrogate Decision Maker/ POA      Review of Systems:    Review of Systems    See above    Objective: 
    V2.0  [facility] Hospitalist Progress Note      Name:  Greg Easton /Age/Sex: 1940  (84 y.o. male)   MRN & CSN:  [id number] & [id number] Encounter Date/Time: 2024 4:11 PM EST    Location:  [address] [place] [address]- PCP: Jacobo Zazueta MD       Hospital Day: 6      Subjective:     Chief Complaint:  left heel pain     Pt states his pain is under control with current pain regimen .  Today pt is alert, oriented x3 less confused.      Assessment and Plan:   infected left heel decubitus ulcer.    SIRS 0/4. Sent by podiatry office podiatry   Surgical culture 2024 growing ESBL, Proteus, MRSA, Corynebacterium striatum, Bacteroides fragilis.   - was on vanc+cefepime; changed to merrem; remains on vanc  - general surgery consulted and following  - f/u surgical cultures  - wound vac management  - will likely consult ID tomorrow to assist with abx on discharge.    Diabetes mellitus type 2   On home Lantus 5U QHS.  - c/w Lantus 5U QHS  - LDSSI  - hypoglycemia protocol    Hypothyroidism c/w levothyroxine    Rash around the groin miconazole powder to be started    Chronic GERD on PPI    Benign essential hypertension   on amlodipine 10 mg oral daily along with hydralazine home dose 100 mg every 8 hours scheduled  - resume home meds    Personally reviewed Lab Studies and Imaging     Drugs that require monitoring for toxicity include vanc and the method of monitoring was renal      Diet ADULT DIET; Regular; 4 carb choices (60 gm/meal)  ADULT ORAL NUTRITION SUPPLEMENT; Breakfast, Lunch, Dinner; Low Calorie/High Protein Oral Supplement  ADULT ORAL NUTRITION SUPPLEMENT; Lunch, Dinner; Wound Healing Oral Supplement   DVT Prophylaxis [x] Lovenox, []  Heparin, [] SCDs, [] Ambulation,  [] Eliquis, [] Xarelto  [] Coumadin   Code Status Full Code   Disposition From: home  Expected Disposition: home  Estimated Date of Discharge: 1-2 days  Patient requires continued admission due to IV abx, placement   Surrogate Decision Maker/ POA  
    V2.0  [facility] [place] Hospitalist Progress Note      Name:  Greg Easton /Age/Sex: 1940  (84 y.o. male)   MRN & CSN:  [id number] & [id number] Encounter Date/Time: 2024 4:11 PM EST    Location:  [address] [place] [address] PCP: Jacobo Zazueta MD       Hospital Day: 4      Subjective:     Chief Complaint:  left heel pain     Surgical cultures growing multiple organisms.     Pt states his pain is under control with current pain regimen .    Assessment and Plan:   infected left heel decubitus ulcer.    SIRS 0/4. Sent by podiatry office podiatry   Surgical culture 2024 growing E. Coli, Proteus, Staphylococcus Aureus, Corynebacterium straiautm  - c/w vanc+cefepime;    - general surgery consulted and following  - f/u surgical cultures  - wound vac management    Diabetes mellitus type 2   On home Lantus 5U QHS.  - c/w Lantus 5U QHS  - LDSSI  - hypoglycemia protocol    Hypothyroidism c/w levothyroxine    Rash around the groin miconazole powder to be started    Chronic GERD on PPI    Benign essential hypertension   on amlodipine 10 mg oral daily along with hydralazine home dose 100 mg every 8 hours scheduled  - resume home meds    Personally reviewed Lab Studies and Imaging     Drugs that require monitoring for toxicity include vanc and the method of monitoring was renal      Diet ADULT DIET; Regular; 4 carb choices (60 gm/meal)  ADULT ORAL NUTRITION SUPPLEMENT; Breakfast, Lunch, Dinner; Low Calorie/High Protein Oral Supplement  ADULT ORAL NUTRITION SUPPLEMENT; Lunch, Dinner; Wound Healing Oral Supplement   DVT Prophylaxis [x] Lovenox, []  Heparin, [] SCDs, [] Ambulation,  [] Eliquis, [] Xarelto  [] Coumadin   Code Status Full Code   Disposition From: home  Expected Disposition: home  Estimated Date of Discharge: 1-2 days  Patient requires continued admission due to IV abx, cultures   Surrogate Decision Maker/ POA      Review of Systems:    Review of Systems    See above    Objective:     Intake/Output Summary (Last 24 
    V2.0  [facility] [place] Hospitalist Progress Note      Name:  Greg Easton /Age/Sex: 1940  (84 y.o. male)   MRN & CSN:  [id number] & [id number] Encounter Date/Time: 2024 4:11 PM EST    Location:  [address] [place] [address] PCP: Jacobo Zazueta MD       Hospital Day: 7      Subjective:     Chief Complaint:  left heel pain     Pt states his pain is under control with current pain regimen .  Today pt is alert, oriented x3. Discussed discharge plan.   ID consulted.     Assessment and Plan:   infected left heel decubitus ulcer.    SIRS 0/4. Sent by podiatry office podiatry   Surgical culture 2024 growing ESBL E. Coli, Proteus, MRSA, Corynebacterium striatum, Bacteroides fragilis and Pseudomonas aeruginosa.   Doppler studies ordered no evidence of stenosis.   - was on vanc+cefepime; changed to merrem; remains on vanc  - general surgery consulted and following  - f/u surgical cultures  - wound vac management  - consulted ID  ; will likely need IV abx on discharge appreciate recs      Benign essential hypertension   on amlodipine 10 mg oral daily along with hydralazine home dose 100 mg every 8 hours scheduled  BP remains elevated likely 2/2 pain  - resume home meds  - pain control  - would modify regimen if pt's remain hypertensive despite controlling pain      Diabetes mellitus type 2   On home Lantus 5U QHS.  - c/w Lantus 5U QHS  - LDSSI  - hypoglycemia protocol    Hypothyroidism c/w levothyroxine    Rash around the groin miconazole powder to be started    Chronic GERD on PPI    Personally reviewed Lab Studies and Imaging     Drugs that require monitoring for toxicity include vanc and the method of monitoring was renal      Diet ADULT DIET; Regular; 4 carb choices (60 gm/meal)  ADULT ORAL NUTRITION SUPPLEMENT; Breakfast, Lunch, Dinner; Low Calorie/High Protein Oral Supplement  ADULT ORAL NUTRITION SUPPLEMENT; Lunch, Dinner; Wound Healing Oral Supplement   DVT Prophylaxis [x] Lovenox, []  Heparin, [] SCDs, [] Ambulation, 
  Pharmacy Note     Cefepime 2 gm IV Q12H ordered for treatment of bone and joint infection. Per [facility] Policy, Cefepime will be changed to 2 gm IV once, followed by 1gm IV Q12H.     Estimated Creatinine Clearance: Estimated Creatinine Clearance: 21 mL/min (A) (based on SCr of 2.4 mg/dL (H)).    BMI:  Body mass index is 25.16 kg/m².    Rationale for Adjustment:  [facility] B-Lactam extended infusion policy    Pharmacy will continue to monitor and adjust dose as necessary.      Please call with any questions.    Thank you,  Maira Winston RPH   
1746 Arrived to PACU.  Monitors applied and report received. Pt very drowsy. Will monitor.  
4 Eyes Skin Assessment     NAME:  Greg Easton  YOB: 1940  MEDICAL RECORD NUMBER:  [id number]    The patient is being assessed for  Admission    I agree that at least one RN has performed a thorough Head to Toe Skin Assessment on the patient. ALL assessment sites listed below have been assessed.      Areas assessed by both nurses:    Head, Face, Ears, Shoulders, Back, Chest, Arms, Elbows, Hands, Sacrum. Buttock, Coccyx, Ischium, Legs. Feet and Heels, and Under Medical Devices         Does the Patient have a Wound? Yes wound(s) were present on assessment. LDA wound assessment was Initiated and completed by RN       Judd Prevention initiated by RN: Yes  Wound Care Orders initiated by RN: No    Pressure Injury (Stage 3,4, Unstageable, DTI, NWPT, and Complex wounds) if present, place Wound referral order by RN under : Yes    New Ostomies, if present place, Ostomy referral order under : No     Nurse 1 eSignature: Electronically signed by Romina Ma RN on 12/17/24 at 5:41 PM EST    **SHARE this note so that the co-signing nurse can place an eSignature**    Nurse 2 eSignature: Electronically signed by Nadine Elam LPN on 12/17/24 at 5:42 PM EST   
4 Eyes Skin Assessment     NAME:  Greg Easton  YOB: 1940  MEDICAL RECORD NUMBER:  [id number]    The patient is being assessed for  Post-Op Surgical    I agree that at least one RN has performed a thorough Head to Toe Skin Assessment on the patient. ALL assessment sites listed below have been assessed.      Areas assessed by both nurses:    Head, Face, Ears, Shoulders, Back, Chest, Arms, Elbows, Hands, Sacrum. Buttock, Coccyx, Ischium, Legs. Feet and Heels, Under Medical Devices , and Other wounds notices on admission         Does the Patient have a Wound? Yes wound(s) were present on assessment. LDA wound assessment was Initiated and completed by RN       Judd Prevention initiated by RN: Yes  Wound Care Orders initiated by RN: Yes    Pressure Injury (Stage 3,4, Unstageable, DTI, NWPT, and Complex wounds) if present, place Wound referral order by RN under : Yes    New Ostomies, if present place, Ostomy referral order under : No     Nurse 1 eSignature: Electronically signed by Eddie Munson RN on 12/23/24 at 6:59 AM EST    **SHARE this note so that the co-signing nurse can place an eSignature**    Nurse 2 eSignature: Electronically signed by Edilma Travis LPN on 12/23/24 at 7:01 AM EST   
Anesthesia and OR desk notified patient had Lovenox and ate a full meal at 0900  
BRIEF ID NOTE    The patient has been approved for placement.  There is a question about giving the patient TMP/SMX given his renal dysfunction and hyperkalemia.  This would likely be the only oral option to treat all pathogens as the ESBL-producing E coli was resistant to fluoroquinolones.    IV vancomycin and meropenem would treat all pathogens.  If there is no concern for osteomyelitis, I would treat with 10 days of IV antibiotics.  If you feel comfortable using TMP/SMX, this could be combined with a fluoroquinolone for an oral option.  However, I will defer to the primary team on this.    Elton Byrne MD, MPH  Associate Professsor  Division of Infectious Diseases  [facility]  
Comprehensive Nutrition Assessment    Type and Reason for Visit:  Initial, Wound    Nutrition Recommendations/Plan:   Restart 5 CHO choice diet and low darrin/high protein oral nutrition supplement TID w/ wound healing oral nutrition supplement BID   Monitor weights, po intakes, glucose, skin, POC     Malnutrition Assessment:  Malnutrition Status:  At risk for malnutrition (12/18/24 0911)    Context:  Chronic Illness     Findings of the 6 clinical characteristics of malnutrition:  Energy Intake:  No decrease in energy intake (per pt)  Weight Loss:  Mild weight loss     Body Fat Loss:  No body fat loss     Muscle Mass Loss:  Mild muscle mass loss Clavicles (pectoralis & deltoids), Hand (interosseous)  Fluid Accumulation:  Unable to assess     Strength:  Not Performed    Nutrition Assessment:    Admitted w/ decubitus ulcer, h/o DM2, HTN, osteoarthrisis, CKD, prostate ca, CHF. Pt up in bed, ate about 50% of breakfast during visit and was still working. Pt states he feels as though he has been eating normally/well, and was unaware that he had lost wt until admission. CBW does not have noted source, but bedscale wt at visit was 170# w/ pillows and blankets on bed. At high risk for malnutrition. Pt does enjoy oral supplement but prefers vanilla and strawberry flavors, will note. Trial wound healing oral supplement as well. Follow at moderate nutrition risk.    Nutrition Related Findings:    +LR @ 100ml/hr, bowel meds, synthroid; hgb 7.7, A1C 7.2%, GFR 28, Cr 2.2, BUN 32 Wound Type: Unstageable       Current Nutrition Intake & Therapies:    Average Meal Intake: 51-75%  Average Supplements Intake: %  Diet NPO Exceptions are: Sips of Water with Meds    Anthropometric Measures:  Height: 173 cm (5' 8.11\")  Ideal Body Weight (IBW): 155 lbs (70 kg)    Admission Body Weight: 75.3 kg (166 lb 0.1 oz) (no source)  Current Body Weight: 75.3 kg (166 lb 0.1 oz),   IBW. Weight Source: Not specified  Current BMI (kg/m2): 
General Surgery-Dr. Wallace    [facility] Day: 4    Chief Complaint on Admission: left heel wound      Subjective:     Had wound vac placed.  Pain mostly controlled.  Tolerating diet.  Had MRI.         ROS:  Review of Systems   Constitutional:  Negative for chills and fever.   Skin:  Positive for wound.       Allergies  Patient has no known allergies.          Diagnosis Date    Acute kidney failure (HCC)     Acute urinary tract infection 03/15/2012    Anemia     Ataxia 2010    Ataxia     Bacteremia     Candidiasis     Chronic combined systolic and diastolic congestive heart failure (HCC)     Chronic kidney disease     Cognitive communication deficit     Diabetes mellitus ([facility]) 2011    type 2, controlled    Extended spectrum beta lactamase (ESBL) resistance     Fusion of spine of cervical region 10/01/2012    Gait disturbance 2011    History of prostate cancer 1996    adenocarcinoma    History of tobacco use 1959    Hydronephrosis     Hyperkalemia     Hyperlipidemia 2011    Hypertension     Hypertensive heart disease with CHF (congestive heart failure) (HCC)     Hypotension     Immunodeficiency (HCC)     Metabolic encephalopathy     Muscle weakness     Osteoarthritis 2011    Osteoarthritis     Secondary Hyperaldosteronism (HCC)     Supraventricular tachycardia (HCC)     Tubulo-interstitial nephritis        Objective:     Vitals:    24 0815   BP: (!) 178/68   Pulse: 78   Resp: 14   Temp: 98.2 °F (36.8 °C)   SpO2: 97%       TEMPERATURE:  Current -Temp: 98.2 °F (36.8 °C); Max - Temp  Av.2 °F (36.8 °C)  Min: 97.9 °F (36.6 °C)  Max: 98.4 °F (36.9 °C)    No intake/output data recorded.I/O last 3 completed shifts:  In: 250 [P.O.:240; I.V.:10]  Out: 2100 [Urine:2100]      Physical Exam:  Physical Exam  Vitals reviewed.   Constitutional:       General: He is not in acute distress.  HENT:      Head: Normocephalic and atraumatic.      Right Ear: External ear normal.      Left 
General Surgery-Dr. Wallace    [facility] Day: 7    Chief Complaint on Admission: left heel wound      Subjective:     Pain controlled.   No issues with wound vac.  Cx finalized.         ROS:  Review of Systems   Constitutional:  Negative for chills and fever.   Skin:  Positive for wound.       Allergies  Patient has no known allergies.          Diagnosis Date    Acute kidney failure (HCC)     Acute urinary tract infection 03/15/2012    Anemia     Ataxia 2010    Ataxia     Bacteremia     Candidiasis     Chronic combined systolic and diastolic congestive heart failure (HCC)     Chronic kidney disease     Cognitive communication deficit     Diabetes mellitus ([facility]) 2011    type 2, controlled    Extended spectrum beta lactamase (ESBL) resistance     Fusion of spine of cervical region 10/01/2012    Gait disturbance 2011    History of prostate cancer 1996    adenocarcinoma    History of tobacco use 1959    Hydronephrosis     Hyperkalemia     Hyperlipidemia 2011    Hypertension     Hypertensive heart disease with CHF (congestive heart failure) (HCC)     Hypotension     Immunodeficiency (HCC)     Metabolic encephalopathy     Muscle weakness     Osteoarthritis 2011    Osteoarthritis     Secondary Hyperaldosteronism (HCC)     Supraventricular tachycardia (HCC)     Tubulo-interstitial nephritis        Objective:     Vitals:    24 0227   BP: (!) 162/59   Pulse: 71   Resp: 18   Temp: 98.1 °F (36.7 °C)   SpO2: 100%       TEMPERATURE:  Current -Temp: 98.1 °F (36.7 °C); Max - Temp  Av.1 °F (36.7 °C)  Min: 97.9 °F (36.6 °C)  Max: 98.5 °F (36.9 °C)    No intake/output data recorded.I/O last 3 completed shifts:  In: -   Out: 1200 [Urine:1200]      Physical Exam:  Physical Exam  Vitals reviewed.   Constitutional:       General: He is not in acute distress.  HENT:      Head: Normocephalic and atraumatic.      Right Ear: External ear normal.      Left Ear: External ear normal.   Eyes:     
General Surgery-Dr. Wallace    [facility] Day: 3    Chief Complaint on Admission: left heel wound      Subjective:     Denies complaints.  Pain controlled.  Went to OR yesterday.         ROS:  Review of Systems   Constitutional:  Negative for chills and fever.   Skin:  Positive for wound.       Allergies  Patient has no known allergies.          Diagnosis Date    Acute kidney failure (HCC)     Acute urinary tract infection 03/15/2012    Anemia     Ataxia 2010    Ataxia     Bacteremia     Candidiasis     Chronic combined systolic and diastolic congestive heart failure (HCC)     Chronic kidney disease     Cognitive communication deficit     Diabetes mellitus ([facility]) 2011    type 2, controlled    Extended spectrum beta lactamase (ESBL) resistance     Fusion of spine of cervical region 10/01/2012    Gait disturbance 2011    History of prostate cancer 1996    adenocarcinoma    History of tobacco use 1959    Hydronephrosis     Hyperkalemia     Hyperlipidemia 2011    Hypertension     Hypertensive heart disease with CHF (congestive heart failure) (HCC)     Hypotension     Immunodeficiency (HCC)     Metabolic encephalopathy     Muscle weakness     Osteoarthritis 2011    Osteoarthritis     Secondary Hyperaldosteronism (HCC)     Supraventricular tachycardia (HCC)     Tubulo-interstitial nephritis        Objective:     Vitals:    24 0615   BP: (!) 172/62   Pulse: 74   Resp: 18   Temp: 98.1 °F (36.7 °C)   SpO2: 100%       TEMPERATURE:  Current -Temp: 98.1 °F (36.7 °C); Max - Temp  Av °F (36.7 °C)  Min: 97.3 °F (36.3 °C)  Max: 98.8 °F (37.1 °C)    No intake/output data recorded.I/O last 3 completed shifts:  In: 300 [I.V.:300]  Out: 1705 [Urine:1700; Blood:5]      Physical Exam:  Physical Exam  Vitals reviewed.   Constitutional:       General: He is not in acute distress.  HENT:      Head: Normocephalic and atraumatic.      Right Ear: External ear normal.      Left Ear: External ear 
ID consult completed telehealth with Dr. Byrne  
PHARMACY VANCOMYCIN MONITORING SERVICE  Pharmacy consulted by Dr. Eduard Rizo MD for monitoring and adjustment.    Indication for treatment: Vancomycin indication: Bone/Joint infection   Goal trough: Trough Goal: 15-20 mcg/mL  AUC/USMAN: 400-600    Risk Factors for MRSA Identified:   Documented isolation of MRSA within 1 year , Purulent and/or complicated SSTI    Pertinent Laboratory Values:   Temp Readings from Last 3 Encounters:   12/17/24 98.8 °F (37.1 °C) (Oral)   12/10/24 97.6 °F (36.4 °C) (Temporal)   11/05/24 97.6 °F (36.4 °C) (Oral)     No results for input(s): \"WBC\", \"LACTATE\" in the last 72 hours.  No results for input(s): \"BUN\", \"CREATININE\" in the last 72 hours.  Estimated Creatinine Clearance: 21 mL/min (A) (based on SCr of 2.4 mg/dL (H)).  No intake or output data in the 24 hours ending 12/17/24 1732  Last Encounter Weight:  Wt Readings from Last 3 Encounters:   12/17/24 75.3 kg (166 lb)   12/10/24 85.7 kg (189 lb)   11/05/24 75.3 kg (166 lb)       Pertinent Cultures:   Date    Source    Results  12/17   Blood    Sent  12/17   Urine    Sent    Vancomycin level:   TROUGH:  No results for input(s): \"VANCOTROUGH\" in the last 72 hours.  RANDOM:  No results for input(s): \"VANCORANDOM\" in the last 72 hours.    Assessment:  HPI: Greg Easton is a 84 yoM with a PMH of CKD, DM, HLD, HTN and hypotension admitted due to concern for sepsis secondary to a left heel decubitus ulcer.  SCr, BUN, and urine output:   CKD baseline  Last Scr from 12/13 was 2.4 mg/dL, appears to be baseline. Labs ordered or today but not yet drawn.  Day(s) of therapy: 1  Vancomycin concentration: TBD    Plan:  Given no labs from today and history of CKD will dose vancomycin intermittently off of levels for now.  Will give a 25 mg/kg Loading dose today (2000 mg IV x ONCE) and check a vancomycin level in the AM.  Pharmacy will continue to monitor patient and adjust therapy as indicated    VANCOMYCIN CONCENTRATION SCHEDULED FOR 12/18 @ 
PHARMACY VANCOMYCIN MONITORING SERVICE  Pharmacy consulted by Dr. Eduard Rizo MD for monitoring and adjustment.    Indication for treatment: Vancomycin indication: Bone/Joint infection   Goal trough: Trough Goal: 15-20 mcg/mL  AUC/USMAN: 400-600    Risk Factors for MRSA Identified:   Documented isolation of MRSA within 1 year , Purulent and/or complicated SSTI    Pertinent Laboratory Values:   Temp Readings from Last 3 Encounters:   12/18/24 98.8 °F (37.1 °C) (Oral)   12/10/24 97.6 °F (36.4 °C) (Temporal)   11/05/24 97.6 °F (36.4 °C) (Oral)     Recent Labs     12/17/24  1810 12/18/24  0237   WBC 6.0 6.4     Recent Labs     12/17/24  1810 12/18/24  0237   BUN 34* 32*   CREATININE 2.1* 2.2*     Estimated Creatinine Clearance: 23 mL/min (A) (based on SCr of 2.2 mg/dL (H)).    Intake/Output Summary (Last 24 hours) at 12/18/2024 1335  Last data filed at 12/18/2024 0628  Gross per 24 hour   Intake --   Output 1100 ml   Net -1100 ml     Last Encounter Weight:  Wt Readings from Last 3 Encounters:   12/17/24 75.3 kg (166 lb)   12/17/24 75.3 kg (166 lb)   12/10/24 85.7 kg (189 lb)       Pertinent Cultures:   Date    Source    Results  12/17   Blood    In process  12/17   Urine    In process    Vancomycin level:   TROUGH:  No results for input(s): \"VANCOTROUGH\" in the last 72 hours.  RANDOM:    Recent Labs     12/18/24  0237   VANCORANDOM 27.2*       Assessment:  HPI: Greg Easton is a 84 yoM with a PMH of CKD, DM, HLD, HTN and hypotension admitted due to concern for sepsis secondary to a left heel decubitus ulcer.  12/18 - Surgery notes plans for possible debridement of foot, consult to podiatry pending.  SCr, BUN, and urine output:   CKD4, SCR remains close to baseline @2.2 (baseline 2.1-2.2)  BUN elevated @32, slight downward trend  UOP appears ok  Day(s) of therapy: 2 (duration to be determined)  Vancomycin concentration:  27.2, vanco level was drawn immediately following completion of initial 2000mg ivpb loading dose, not 
PHARMACY VANCOMYCIN MONITORING SERVICE  Pharmacy consulted by Dr. Eduard Rizo MD for monitoring and adjustment.    Indication for treatment: Vancomycin indication: Bone/Joint infection   Goal trough: Trough Goal: 15-20 mcg/mL  AUC/USMAN: 400-600    Risk Factors for MRSA Identified:   Documented isolation of MRSA within 1 year , Purulent and/or complicated SSTI    Pertinent Laboratory Values:   Temp Readings from Last 3 Encounters:   12/19/24 97.9 °F (36.6 °C) (Oral)   12/10/24 97.6 °F (36.4 °C) (Temporal)   11/05/24 97.6 °F (36.4 °C) (Oral)     Recent Labs     12/17/24  1810 12/18/24  0237 12/19/24  1214   WBC 6.0 6.4 6.4     Recent Labs     12/17/24  1810 12/18/24  0237 12/19/24  1214   BUN 34* 32* 28*   CREATININE 2.1* 2.2* 2.2*     Estimated Creatinine Clearance: 23 mL/min (A) (based on SCr of 2.2 mg/dL (H)).    Intake/Output Summary (Last 24 hours) at 12/19/2024 1331  Last data filed at 12/19/2024 0901  Gross per 24 hour   Intake 540 ml   Output 605 ml   Net -65 ml     Last Encounter Weight:  Wt Readings from Last 3 Encounters:   12/17/24 75.3 kg (166 lb)   12/17/24 75.3 kg (166 lb)   12/10/24 85.7 kg (189 lb)       Pertinent Cultures:   Date    Source    Results  12/17   Blood    NG  12/17   Urine    Serratia w/ contaminants      Vancomycin level:   TROUGH:  No results for input(s): \"VANCOTROUGH\" in the last 72 hours.  RANDOM:    Recent Labs     12/18/24  0237 12/19/24  1214   VANCORANDOM 27.2* 11.8       Assessment:  HPI: Greg Easton is a 83 yo M with a PMH of CKD, DM, HLD, HTN and hypotension admitted due to concern for sepsis secondary to a left heel decubitus ulcer.  12/18 - Surgery notes plans for possible debridement of foot, consult to podiatry pending.  SCr, BUN, and urine output:   CKD4, SCR 2.2 (at/near baseline 2.1-2.2)  BUN 28, slight downward trend  UOP appears ok  Day(s) of therapy: 3 (duration to be determined)   Vancomycin concentration:  12/18: 27.2, vanco level was drawn immediately following 
PHARMACY VANCOMYCIN MONITORING SERVICE  Pharmacy consulted by Dr. Eduard Rizo MD for monitoring and adjustment.    Indication for treatment: Vancomycin indication: Bone/Joint infection   Goal trough: Trough Goal: 15-20 mcg/mL  AUC/USMAN: 400-600    Risk Factors for MRSA Identified:   Documented isolation of MRSA within 1 year , Purulent and/or complicated SSTI    Pertinent Laboratory Values:   Temp Readings from Last 3 Encounters:   12/21/24 97.2 °F (36.2 °C) (Oral)   12/10/24 97.6 °F (36.4 °C) (Temporal)   11/05/24 97.6 °F (36.4 °C) (Oral)     Recent Labs     12/19/24  1214 12/20/24  0318 12/21/24  0417   WBC 6.4 5.9 5.4     Recent Labs     12/19/24  1214 12/20/24  0318 12/21/24  0417   BUN 28* 31* 34*   CREATININE 2.2* 2.3* 2.5*     Estimated Creatinine Clearance: 23 mL/min (A) (based on SCr of 2.5 mg/dL (H)).    Intake/Output Summary (Last 24 hours) at 12/21/2024 1324  Last data filed at 12/21/2024 0446  Gross per 24 hour   Intake 955.75 ml   Output 1600 ml   Net -644.25 ml     Last Encounter Weight:  Wt Readings from Last 3 Encounters:   12/21/24 85.2 kg (187 lb 13.3 oz)   12/17/24 75.3 kg (166 lb)   12/10/24 85.7 kg (189 lb)       Pertinent Cultures:   Date   Source   Results  12/17  Blood   NG x 2 days  12/17  Urine   Serratia marcescens 75,000 cfu/ml  12/18  Surgical culture ESBL E. Coli, P mirabilis, Staph aureus, Corynebacterium striatum      Vancomycin level:   TROUGH:  No results for input(s): \"VANCOTROUGH\" in the last 72 hours.  RANDOM:    Recent Labs     12/19/24  1214 12/20/24 0318   VANCORANDOM 11.8 17.2       Assessment:  HPI: Greg Easton is a 85 yo M with a PMH of CKD, DM, HLD, HTN and hypotension admitted due to concern for sepsis secondary to a left heel decubitus ulcer.  12/18 - Surgery notes plans for possible debridement of foot, consult to podiatry pending.  12/20 - Surgery notes no osteomyelitis in heal, vancomycin to be dc'd if no evidence of MRSA in surgical culture.   SCr, BUN, and urine 
PHARMACY VANCOMYCIN MONITORING SERVICE  Pharmacy consulted by Dr. Eduard Rizo MD for monitoring and adjustment.    Indication for treatment: Vancomycin indication: Bone/Joint infection   Goal trough: Trough Goal: 15-20 mcg/mL  AUC/USMAN: 400-600    Risk Factors for MRSA Identified:   Documented isolation of MRSA within 1 year , Purulent and/or complicated SSTI    Pertinent Laboratory Values:   Temp Readings from Last 3 Encounters:   12/23/24 98.3 °F (36.8 °C) (Oral)   12/10/24 97.6 °F (36.4 °C) (Temporal)   11/05/24 97.6 °F (36.4 °C) (Oral)     Recent Labs     12/21/24  0417 12/22/24  0305   WBC 5.4 6.9     Recent Labs     12/21/24  0417 12/22/24  0305   BUN 34* 35*   CREATININE 2.5* 2.5*     Estimated Creatinine Clearance: 23 mL/min (A) (based on SCr of 2.5 mg/dL (H)).    Intake/Output Summary (Last 24 hours) at 12/23/2024 1043  Last data filed at 12/22/2024 2122  Gross per 24 hour   Intake --   Output 450 ml   Net -450 ml     Last Encounter Weight:  Wt Readings from Last 3 Encounters:   12/21/24 85.2 kg (187 lb 13.3 oz)   12/17/24 75.3 kg (166 lb)   12/10/24 85.7 kg (189 lb)       Pertinent Cultures:   Date   Source   Results  12/17  Blood   NG x 2 days  12/17  Urine   Serratia marcescens 75,000 cfu/ml  12/18  Surgical culture ESBL E. Coli, P mirabilis, Staph aureus, Corynebacterium striatum, Bacteroids fragilis    Vancomycin level:   TROUGH:  No results for input(s): \"VANCOTROUGH\" in the last 72 hours.  RANDOM:    Recent Labs     12/22/24  0305   VANCORANDOM 19.0       Assessment:  HPI: 84 y.o. male presents with concern for sepsis secondary to a left heel decubitus ulcer. 12/18 underwent food I&D and obtained surgical cultures which are positive as noted above.  SCr, BUN, and urine output:   CKD4, SCr 2.5 today (baseline ~2.1-2.2 mg/dL)  BUN 34  UOP 0.7 ml/kg/hr yesterday  Day(s) of therapy: day 6 of total therapy  Vancomycin concentration:  12/18: 27.2, vanco level was drawn immediately following completion of 
Patient states the phone number in the chart is not his sons number,patient states his son is in the waiting room,called for him no answer  
RENAL DOSE ADJUSTMENT MADE PER P/T PROTOCOL    PREVIOUS ORDER:  Meropenem 1g IV x once (30 min), followed by 1g q8h EI    Estimated Creatinine Clearance: 23 mL/min (A) (based on SCr of 2.5 mg/dL (H)).  Recent Labs     12/19/24  1214 12/20/24  0318 12/21/24  0417   BUN 28* 31* 34*   CREATININE 2.2* 2.3* 2.5*    194 186     NEW RENALLY ADJUSTED ORDER:  Meropenem 1g IV x once (30 min), followed by 500 mg IV q12h    Thank you,  Cindy Cha [facility], PharmD.  12/21/2024 9:13 AM    
Swing referral received.  PT/OT notes reviewed.  Per PT patient will need a lift for transfers to and from bed, cannot stand despite max assist with PT as well.  From a therapy standpoint patient is not appropriate for swing bed.  However did discuss with Case Management and there is a possibility of the patient needing IV abx on discharge.  No ID consult noted.  If patient is unable to get placed with IV abx swing could accommodate the patient for IV abx which would qualify as a skilled need in the Swing program.  Please reach out to nursing supervisor in [place] if that is the route that is most appropriate.    
This nurse called report to Susan at [facility], all questions answered at this time.   
This nurse contacted Dr. Guallpa to see what type of dressing should go onto patient's left heel for discharge, as pt currently has a hospital wound vac in place. Per Dr. Guallpa a wet to dry dressing should be placed prior to patient discharging.    This nurse removed wound vac and placed a wtd dressing on left heel as superior is here for transportation.     
Decision Maker/ POA      Review of Systems:    Review of Systems    See above    Objective:     Intake/Output Summary (Last 24 hours) at 12/19/2024 1804  Last data filed at 12/19/2024 1802  Gross per 24 hour   Intake 240 ml   Output 1300 ml   Net -1060 ml        Vitals:   Vitals:    12/19/24 1437   BP: (!) 147/65   Pulse: 78   Resp: 18   Temp: 98.4 °F (36.9 °C)   SpO2: 98%       Physical Exam:     General: NAD  Eyes: EOMI  ENT: neck supple  Cardiovascular: Regular rate.  Respiratory: Clear to auscultation  Gastrointestinal: Soft, non tender  Genitourinary: no suprapubic tenderness  Musculoskeletal: No edema, left heel w wound vac  Skin: warm, dry  Neuro: Alert.  Psych: Mood appropriate.     Medications:   Medications:    vancomycin  750 mg IntraVENous Once    sodium chloride flush  5-40 mL IntraVENous 2 times per day    cefepime  1,000 mg IntraVENous Q12H    enoxaparin  30 mg SubCUTAneous Daily    vancomycin (VANCOCIN) intermittent dosing (placeholder)   Other RX Placeholder    insulin glargine  10 Units SubCUTAneous Nightly    insulin lispro  0-4 Units SubCUTAneous 4x Daily AC & HS    atorvastatin  20 mg Oral Nightly    aspirin  81 mg Oral Daily    amLODIPine  10 mg Oral Daily    gabapentin  100 mg Oral BID    hydrALAZINE  100 mg Oral 3 times per day    levothyroxine  75 mcg Oral Daily    pantoprazole  40 mg Oral QAM AC    senna  2 tablet Oral Daily    miconazole   Topical BID      Infusions:    sodium chloride 25 mL/hr at 12/18/24 1225    dextrose       PRN Meds: HYDROcodone-acetaminophen, 1 tablet, Q6H PRN  sodium chloride flush, 5-40 mL, PRN  sodium chloride, , PRN  ondansetron, 4 mg, Q8H PRN   Or  ondansetron, 4 mg, Q6H PRN  polyethylene glycol, 17 g, Daily PRN  acetaminophen, 650 mg, Q6H PRN   Or  acetaminophen, 650 mg, Q6H PRN  glucose, 4 tablet, PRN  dextrose bolus, 125 mL, PRN   Or  dextrose bolus, 250 mL, PRN  glucagon (rDNA), 1 mg, PRN  dextrose, , Continuous PRN        Labs      Recent Results (from the 
concentration:  12/18: 27.2, vanco level was drawn immediately following completion of initial 2000mg ivpb loading dose, not a meaningful level  12/19 - 11.8 mg/L, ~35 hours post loading dose  12/20 - 17.2, 9 hours post dose of 750mg x once, ok to re-dose  12/22 - 19.0 mg/L, 36 hours post dose of 1000 mg x once, ok to re-dose     Plan:  Continue with intermittent vancomycin dosing based on levels due to CKD4  Level today in goal range but at high end, however do not want patient to go subtherapeutic. Will re dose but will push timing of dose back to allow for some more clearance.  Will give vancomycin 1000 mg IV x once @ 2100 (~54 hours from last dose of 1000 mg)  Pharmacy will continue to monitor patient and adjust therapy as indicated     VANCOMYCIN CONCENTRATION SCHEDULED FOR 12/24 @ 0900      Cindy Cha RPH, PharmD.  12/22/2024 12:28 PM    
urine output:   CKD4, SCR up slightly form baseline to 2.3 today (at/near baseline 2.1-2.2)  BUN 31, slight upward trend  UOP appears ok  Day(s) of therapy: 3 of 7 (duration to be determined)   Vancomycin concentration:  12/18: 27.2, vanco level was drawn immediately following completion of initial 2000mg ivpb loading dose, not a meaningful level  12/19 @12:14: 11.8  12/20 - 17.5, 9 hour level post 750mg dose, ok to re-dose     Plan:  Continue with intermittent vancomycin dosing based on levels due to CKD4  Give vancomycin 1000mg ivpb x1 dose today.  Anticipate needing a dose every other day going forward.  Recheck the vanco level in 2 days.  Pharmacy will continue to monitor patient and adjust therapy as indicated     VANCOMYCIN CONCENTRATION SCHEDULED FOR 12/22 @0600      Thank you for the consult.  Cecilia Cochran [facility]   
Estimated Avg Glucose 161 mg/dL   Basic Metabolic Panel    Collection Time: 12/18/24  2:37 AM   Result Value Ref Range    Sodium 137 136 - 145 mmol/L    Potassium 4.2 3.5 - 5.1 mmol/L    Chloride 110 99 - 110 mmol/L    CO2 19 (L) 21 - 32 mmol/L    Anion Gap 8 (L) 9 - 17 mmol/L    Glucose 151 (H) 74 - 99 mg/dL    BUN 32 (H) 7 - 20 mg/dL    Creatinine 2.2 (H) 0.8 - 1.3 mg/dL    Est, Glom Filt Rate 28 (L) >60 mL/min/1.73m2    Calcium 8.5 8.3 - 10.6 mg/dL   CBC    Collection Time: 12/18/24  2:37 AM   Result Value Ref Range    WBC 6.4 4.0 - 10.5 k/uL    RBC 2.47 (L) 4.60 - 6.20 m/uL    Hemoglobin 7.7 (L) 13.5 - 18.0 g/dL    Hematocrit 25.0 (L) 42.0 - 52.0 %    .2 (H) 78.0 - 100.0 fL    MCH 31.2 (H) 27.0 - 31.0 pg    MCHC 30.8 (L) 32.0 - 36.0 g/dL    RDW 15.3 (H) 11.7 - 14.9 %    Platelets 186 140 - 440 k/uL    MPV 11.1 7.5 - 11.1 fL   Phosphorus    Collection Time: 12/18/24  2:37 AM   Result Value Ref Range    Phosphorus 2.5 2.5 - 4.9 mg/dL   Magnesium    Collection Time: 12/18/24  2:37 AM   Result Value Ref Range    Magnesium 2.1 1.8 - 2.4 mg/dL   POCT Glucose    Collection Time: 12/18/24  7:43 AM   Result Value Ref Range    POC Glucose 129 (H) 74 - 99 mg/dL   POCT Glucose    Collection Time: 12/18/24 11:21 AM   Result Value Ref Range    POC Glucose 276 (H) 74 - 99 mg/dL   POCT Glucose    Collection Time: 12/18/24  2:52 PM   Result Value Ref Range    POC Glucose 114 (H) 74 - 99 mg/dL   POCT Glucose    Collection Time: 12/18/24  9:40 PM   Result Value Ref Range    POC Glucose 211 (H) 74 - 99 mg/dL        Imaging/Diagnostics Last 24 Hours   No results found.    Electronically signed by Lc De Paz MD on 12/18/2024 at 10:21 PM    
Focused Physical Findings, Weight, Skin, GI Status, Meal Time Behavior    Discharge Planning:    Too soon to determine     Murphy Chopra RD  Contact: [phone]    
Pain management, Endurance training, Equipment evaluation, education, & procurement, Self-Care / ADL, Patient/Caregiver education & training, Safety education & training, Positioning, Home management training     Equipment: defer    Goals:  Pt goal: go home  Time Frame for STGs: discharge  Goal 1: Pt will perform UE ADLs Supervision  Goal 2: Pt will perform LE ADLs E[place] w/ AD  Goal 3: Pt will perform toileting Eitan w/ AD  Goal 4: Pt will perform functional transfer w/ AD E[place]  Goal 5: Pt will perform functional mobility w/ AD Eitan  Goal 6: Pt will perform therex/theract in order to increase functional activity tolerance and dynamic standing balance  Goal 7: Pt will perform all aspects of bed mobility SBA    Treatment plan:  Pt will perform functional task in sit reaching in all 3 planes in order to increase dynamic sitting balance and functional activity tolerance    Recommendations for NURSING activity: OWEN to chair    Time:   Time in: 1122  Time out: 1140  Timed treatment minutes: 8  Total time: 18 minutes    Electronically signed by:    Radha Olson [id number]  11:43 AM,12/22/2024

## 2024-12-24 NOTE — CARE COORDINATION
Pt on discharge set up with [facility] for 1900.  Updated son Greg Lora, nurse Niurka and Shae at [facility].

## 2024-12-24 NOTE — PLAN OF CARE
Problem: Chronic Conditions and Co-morbidities  Goal: Patient's chronic conditions and co-morbidity symptoms are monitored and maintained or improved  12/18/2024 1604 by Nadine Maxwell RN  Outcome: Progressing  12/18/2024 1603 by Nadine Maxwell RN  Outcome: Progressing     Problem: Discharge Planning  Goal: Discharge to home or other facility with appropriate resources  12/18/2024 1604 by Nadine Maxwell RN  Outcome: Progressing  12/18/2024 1603 by Nadine Maxwell RN  Outcome: Progressing     Problem: Skin/Tissue Integrity  Goal: Absence of new skin breakdown  Description: 1.  Monitor for areas of redness and/or skin breakdown  2.  Assess vascular access sites hourly  3.  Every 4-6 hours minimum:  Change oxygen saturation probe site  4.  Every 4-6 hours:  If on nasal continuous positive airway pressure, respiratory therapy assess nares and determine need for appliance change or resting period.  12/18/2024 1604 by Nadine Maxwell RN  Outcome: Progressing  12/18/2024 1603 by Nadine Maxwell RN  Outcome: Progressing     Problem: Safety - Adult  Goal: Free from fall injury  12/18/2024 1604 by Nadine Maxwell RN  Outcome: Progressing  12/18/2024 1603 by Nadine Maxwell RN  Outcome: Progressing     Problem: ABCDS Injury Assessment  Goal: Absence of physical injury  12/18/2024 1604 by Nadine Maxwell RN  Outcome: Progressing  12/18/2024 1603 by Nadine Maxwell RN  Outcome: Progressing     Problem: Pain  Goal: Verbalizes/displays adequate comfort level or baseline comfort level  12/18/2024 1604 by Nadine Maxwell RN  Outcome: Progressing  12/18/2024 1603 by Nadine Maxwell RN  Outcome: Progressing     Problem: Nutrition Deficit:  Goal: Optimize nutritional status  12/18/2024 1604 by Nadine Maxwell RN  Outcome: Progressing  12/18/2024 1603 by Nadine Maxwell RN  Outcome: Progressing     
  Problem: Chronic Conditions and Co-morbidities  Goal: Patient's chronic conditions and co-morbidity symptoms are monitored and maintained or improved  12/20/2024 1125 by Aranza Castro LPN  Outcome: Progressing  12/19/2024 2213 by Cristian Corona LPN  Outcome: Progressing     Problem: Skin/Tissue Integrity  Goal: Absence of new skin breakdown  Description: 1.  Monitor for areas of redness and/or skin breakdown  2.  Assess vascular access sites hourly  3.  Every 4-6 hours minimum:  Change oxygen saturation probe site  4.  Every 4-6 hours:  If on nasal continuous positive airway pressure, respiratory therapy assess nares and determine need for appliance change or resting period.  12/20/2024 1125 by Aranza Castro LPN  Outcome: Progressing  12/19/2024 2213 by Cristian Corona LPN  Outcome: Progressing     Problem: Safety - Adult  Goal: Free from fall injury  12/20/2024 1125 by Aranza Castro LPN  Outcome: Progressing  12/19/2024 2213 by Cristian Corona LPN  Outcome: Progressing     Problem: ABCDS Injury Assessment  Goal: Absence of physical injury  12/20/2024 1125 by Aranza Castro LPN  Outcome: Progressing  12/19/2024 2213 by Cristian Corona LPN  Outcome: Progressing     Problem: Pain  Goal: Verbalizes/displays adequate comfort level or baseline comfort level  12/20/2024 1125 by Aranza Castro LPN  Outcome: Progressing  12/19/2024 2213 by Cristian Corona LPN  Outcome: Progressing     Problem: Nutrition Deficit:  Goal: Optimize nutritional status  12/20/2024 1125 by Aranza Castro LPN  Outcome: Progressing  12/19/2024 2213 by Cristian Corona LPN  Outcome: Progressing     
  Problem: Chronic Conditions and Co-morbidities  Goal: Patient's chronic conditions and co-morbidity symptoms are monitored and maintained or improved  12/22/2024 0311 by Eddie Munson RN  Outcome: Progressing  12/21/2024 1833 by Herbert Huerta LPN  Outcome: Progressing     Problem: Discharge Planning  Goal: Discharge to home or other facility with appropriate resources  12/22/2024 0311 by Eddie Munson RN  Outcome: Progressing  12/21/2024 1833 by Herbert Huerta LPN  Outcome: Progressing     Problem: Skin/Tissue Integrity  Goal: Absence of new skin breakdown  Description: 1.  Monitor for areas of redness and/or skin breakdown  2.  Assess vascular access sites hourly  3.  Every 4-6 hours minimum:  Change oxygen saturation probe site  4.  Every 4-6 hours:  If on nasal continuous positive airway pressure, respiratory therapy assess nares and determine need for appliance change or resting period.  12/22/2024 0311 by Eddie Munson RN  Outcome: Progressing  12/21/2024 1833 by Herbert Huerta LPN  Outcome: Progressing     Problem: Safety - Adult  Goal: Free from fall injury  12/22/2024 0311 by Eddie Munson RN  Outcome: Progressing  12/21/2024 1833 by Herbert Huerta LPN  Outcome: Progressing     Problem: ABCDS Injury Assessment  Goal: Absence of physical injury  12/22/2024 0311 by Eddie Munson RN  Outcome: Progressing  12/21/2024 1833 by Herbert Huerta LPN  Outcome: Progressing     Problem: Pain  Goal: Verbalizes/displays adequate comfort level or baseline comfort level  12/22/2024 0311 by Eddie Munson RN  Outcome: Progressing  Flowsheets (Taken 12/21/2024 2143)  Verbalizes/displays adequate comfort level or baseline comfort level: Encourage patient to monitor pain and request assistance  12/21/2024 1833 by Herbert Huerta LPN  Outcome: Progressing     Problem: Nutrition Deficit:  Goal: Optimize nutritional status  12/22/2024 
  Problem: Chronic Conditions and Co-morbidities  Goal: Patient's chronic conditions and co-morbidity symptoms are monitored and maintained or improved  Outcome: Progressing     Problem: Discharge Planning  Goal: Discharge to home or other facility with appropriate resources  Outcome: Progressing     Problem: Skin/Tissue Integrity  Goal: Absence of new skin breakdown  Description: 1.  Monitor for areas of redness and/or skin breakdown  2.  Assess vascular access sites hourly  3.  Every 4-6 hours minimum:  Change oxygen saturation probe site  4.  Every 4-6 hours:  If on nasal continuous positive airway pressure, respiratory therapy assess nares and determine need for appliance change or resting period.  Outcome: Progressing     Problem: Safety - Adult  Goal: Free from fall injury  Outcome: Progressing     Problem: ABCDS Injury Assessment  Goal: Absence of physical injury  Outcome: Progressing     
  Problem: Chronic Conditions and Co-morbidities  Goal: Patient's chronic conditions and co-morbidity symptoms are monitored and maintained or improved  Outcome: Progressing     Problem: Discharge Planning  Goal: Discharge to home or other facility with appropriate resources  Outcome: Progressing     Problem: Skin/Tissue Integrity  Goal: Absence of new skin breakdown  Description: 1.  Monitor for areas of redness and/or skin breakdown  2.  Assess vascular access sites hourly  3.  Every 4-6 hours minimum:  Change oxygen saturation probe site  4.  Every 4-6 hours:  If on nasal continuous positive airway pressure, respiratory therapy assess nares and determine need for appliance change or resting period.  Outcome: Progressing     Problem: Safety - Adult  Goal: Free from fall injury  Outcome: Progressing     Problem: ABCDS Injury Assessment  Goal: Absence of physical injury  Outcome: Progressing     Problem: Pain  Goal: Verbalizes/displays adequate comfort level or baseline comfort level  Outcome: Progressing     
  Problem: Chronic Conditions and Co-morbidities  Goal: Patient's chronic conditions and co-morbidity symptoms are monitored and maintained or improved  Outcome: Progressing     Problem: Discharge Planning  Goal: Discharge to home or other facility with appropriate resources  Outcome: Progressing     Problem: Skin/Tissue Integrity  Goal: Absence of new skin breakdown  Description: 1.  Monitor for areas of redness and/or skin breakdown  2.  Assess vascular access sites hourly  3.  Every 4-6 hours minimum:  Change oxygen saturation probe site  4.  Every 4-6 hours:  If on nasal continuous positive airway pressure, respiratory therapy assess nares and determine need for appliance change or resting period.  Outcome: Progressing     Problem: Safety - Adult  Goal: Free from fall injury  Outcome: Progressing     Problem: ABCDS Injury Assessment  Goal: Absence of physical injury  Outcome: Progressing     Problem: Pain  Goal: Verbalizes/displays adequate comfort level or baseline comfort level  Outcome: Progressing     Problem: Nutrition Deficit:  Goal: Optimize nutritional status  Outcome: Progressing     
  Problem: Chronic Conditions and Co-morbidities  Goal: Patient's chronic conditions and co-morbidity symptoms are monitored and maintained or improved  Outcome: Progressing     Problem: Discharge Planning  Goal: Discharge to home or other facility with appropriate resources  Outcome: Progressing     Problem: Skin/Tissue Integrity  Goal: Absence of new skin breakdown  Description: 1.  Monitor for areas of redness and/or skin breakdown  2.  Assess vascular access sites hourly  3.  Every 4-6 hours minimum:  Change oxygen saturation probe site  4.  Every 4-6 hours:  If on nasal continuous positive airway pressure, respiratory therapy assess nares and determine need for appliance change or resting period.  Outcome: Progressing     Problem: Safety - Adult  Goal: Free from fall injury  Outcome: Progressing     Problem: ABCDS Injury Assessment  Goal: Absence of physical injury  Outcome: Progressing     Problem: Pain  Goal: Verbalizes/displays adequate comfort level or baseline comfort level  Outcome: Progressing     Problem: Nutrition Deficit:  Goal: Optimize nutritional status  Outcome: Progressing  Flowsheets (Taken 12/23/2024 1320 by Murphy Chopra, ALL)  Nutrient intake appropriate for improving, restoring, or maintaining nutritional needs:   Assess nutritional status and recommend course of action   Monitor oral intake, labs, and treatment plans   Recommend appropriate diets, oral nutritional supplements, and vitamin/mineral supplements     
  Problem: Chronic Conditions and Co-morbidities  Goal: Patient's chronic conditions and co-morbidity symptoms are monitored and maintained or improved  Outcome: Progressing     Problem: Skin/Tissue Integrity  Goal: Absence of new skin breakdown  Description: 1.  Monitor for areas of redness and/or skin breakdown  2.  Assess vascular access sites hourly  3.  Every 4-6 hours minimum:  Change oxygen saturation probe site  4.  Every 4-6 hours:  If on nasal continuous positive airway pressure, respiratory therapy assess nares and determine need for appliance change or resting period.  Outcome: Progressing     Problem: Safety - Adult  Goal: Free from fall injury  Outcome: Progressing     
appropriate for improving, restoring, or maintaining nutritional needs:   Assess nutritional status and recommend course of action   Monitor oral intake, labs, and treatment plans   Recommend appropriate diets, oral nutritional supplements, and vitamin/mineral supplements

## 2024-12-24 NOTE — CONSULTS
[facility] Nurse  Consult Note       Greg Easton  AGE: 84 y.o.   GENDER: male  : 1940  TODAY'S DATE:  2024    Subjective:     Reason for Evaluation and Assessment: NPWT dressing to left heel.      Greg Easton is a 84 y.o. male referred by:   [x] Physician  [] Nursing  [] Other:     Wound Identification:  Wound Type: pressure  Contributing Factors: diabetes, chronic pressure, and decreased mobility        PAST MEDICAL HISTORY        Diagnosis Date    Acute kidney failure (HCC)     Acute urinary tract infection 03/15/2012    Anemia     Ataxia 2010    Ataxia     Bacteremia     Candidiasis     Chronic combined systolic and diastolic congestive heart failure (HCC)     Chronic kidney disease     Cognitive communication deficit     Diabetes mellitus (HCC) 2011    type 2, controlled    Extended spectrum beta lactamase (ESBL) resistance     Fusion of spine of cervical region 10/01/2012    Gait disturbance 2011    History of prostate cancer 1996    adenocarcinoma    History of tobacco use 1959    Hydronephrosis     Hyperkalemia     Hyperlipidemia 2011    Hypertension     Hypertensive heart disease with CHF (congestive heart failure) (HCC)     Hypotension     Immunodeficiency (HCC)     Metabolic encephalopathy     Muscle weakness     Osteoarthritis 2011    Osteoarthritis     Secondary Hyperaldosteronism (HCC)     Supraventricular tachycardia (HCC)     Tubulo-interstitial nephritis        PAST SURGICAL HISTORY    Past Surgical History:   Procedure Laterality Date    BLADDER SURGERY      BLADDER SURGERY Left 2022    CYSTOSCOPY LEFT STENT EXCHANGE performed by Bal Mckeon MD at [facility] OR    CHANGE OF BLADDER TUBE,COMPLICATED  2024    COLON SURGERY      CYSTOSCOPY Left 2022    LEFT CYSTOSCOPY URETERAL STENT EXCHANGE AND SUPRABUPIC PACE CATHETER EXCHANGE performed by Mirella Wilkins MD at [facility] OR    CYSTOSCOPY Left 10/31/2023 
Consult received, chart review complete.  Appears patient has been switched to oral antibiotics at this time.  [facility] to Dr. De Paz, confirms patient has switched ATBs to oral and no further need for PICC line.  Please re-consult VAT for any further needs.    Consult the Vascular Access Team for questions, concerns, or change in patient's condition.    
[facility] ACUTE CARE PHYSICAL THERAPY EVALUATION  Greg Easton, 1940, [id number], 12/20/2024    Assessment:  Patient admitted for decubitus ulcer LLE heel and is now s/p debridement with LLE WB limitation with unstable/unpredictable medical features and unstable/unpredictable rehabilitative features.  Patient also with recent RUE fx 8/29/24 which contributes to scope and severity of current functional impairments in addition to new LLE impairments.  Patient and son report he was able to stand and walk and move about home independently earlier in 2024.  Presents today in functionally dependent manner.  Has intensive needs for therapy services.       Body Structures, Functions, Activity Limitations Requiring Skilled Therapeutic Intervention: Decreased functional mobility , Decreased safe awareness, Decreased ADL status, Decreased tolerance to work activity, Decreased balance, Decreased endurance, Decreased body mechanics, Decreased high-level IADLs, Increased pain.  While in hospital, skilled physical therapy services are appropriate.  There are no significant educational impairments or barriers to learning which prevent participation with service. Prognosis: fair.  Clinical decision making:  high complexity:  hx 3+ personal factors/comorbidities, exam tests and measures for 3+ elements of body/structures/functions/activity limitation/participation restriction, unstable/unpredictable presentation.      Discharge recommendations:  Patient has intensive post-acute physical therapy service needs. Encourage facility for intensive rehabilitation, anticipate 3 hours per day and 5 days per week.         Plan/Goals:  Physical Therapy Plan  General Plan: 3-5 times per week.  Acute care physical therapy treatment to include patient/caregiver education, therapeutic activity training, gait training, neuromuscular re-education, therapeutic exercise and self care training for home management.  First intend to 
Department of General Surgery   Surgical Service Dr. Wallace   Consult Note    Date of Consult: 12/18/24    Critical care consult time: 0 min    Reason for Consult:  Left heel ulcer    Requesting Physician:  Hospitalist    CHIEF COMPLAINT:  As above    History Obtained From:  patient, electronic medical record    HISTORY OF PRESENT ILLNESS:      The patient is a 84 y.o. male who presented to the ED with complaints described as:      Location: left heel ulcer  Quality: as above  Severity: 0-4 /10  Duration: ~ 1 mo  Timing: pt previously had shoulder injury requiring nursing home stay and decreased mobility contributing to his heel ulcer  Context: as above  Modifying factors: denies  Associated signs and symptoms: denies    Pt was seen by Dr. Espinoza who instructed pt to go to the hospital.    A c/s was placed to Podiatry and also General Surgery.     Pt denies smoking.    He reports he is a diabetic with controlled blood sugars.         Past Medical History:    Past Medical History:   Diagnosis Date    Acute kidney failure ([facility])     Acute urinary tract infection 03/15/2012    Anemia     Ataxia 01/01/2010    Ataxia     Bacteremia     Candidiasis     Chronic combined systolic and diastolic congestive heart failure (HCC)     Chronic kidney disease     Cognitive communication deficit     Diabetes mellitus ([facility]) 01/01/2011    type 2, controlled    Extended spectrum beta lactamase (ESBL) resistance     Fusion of spine of cervical region 10/01/2012    Gait disturbance 01/01/2011    History of prostate cancer 01/01/1996    adenocarcinoma    History of tobacco use 01/01/1959    Hydronephrosis     Hyperkalemia     Hyperlipidemia 01/01/2011    Hypertension     Hypertensive heart disease with CHF (congestive heart failure) ([facility])     Hypotension     Immunodeficiency ([facility])     Metabolic encephalopathy     Muscle weakness     Osteoarthritis 01/01/2011    Osteoarthritis     Secondary Hyperaldosteronism ([facility])     Supraventricular 
12/22/24 2307 New Bag at 12/22/24 2307    ondansetron (ZOFRAN-ODT) disintegrating tablet 4 mg  4 mg Oral Q8H PRN Darion Wallace II, MD        Or    ondansetron (ZOFRAN) injection 4 mg  4 mg IntraVENous Q6H PRN Darion Wallace II, MD        polyethylene glycol (GLYCOLAX) packet 17 g  17 g Oral Daily PRN Darion Wallace II, MD        acetaminophen (TYLENOL) tablet 650 mg  650 mg Oral Q6H PRN Darion Wallace II, MD        Or    acetaminophen (TYLENOL) suppository 650 mg  650 mg Rectal Q6H PRN Darion Wallace II, MD        enoxaparin Sodium (LOVENOX) injection 30 mg  30 mg SubCUTAneous Daily Darion Wallace II, MD   30 mg at 12/23/24 0952    glucose chewable tablet 16 g  4 tablet Oral PRN Darion Wallace II, MD   16 g at 12/19/24 1741    dextrose bolus 10% 125 mL  125 mL IntraVENous PRN Darion Wallace II, MD        Or    dextrose bolus 10% 250 mL  250 mL IntraVENous PRN Darion Wallace II, MD        glucagon injection 1 mg  1 mg SubCUTAneous PRN Darion Wallace II, MD        dextrose 10 % infusion   IntraVENous Continuous PRN Darion Wallace II, MD        insulin lispro (HUMALOG,ADMELOG) injection vial 0-4 Units  0-4 Units SubCUTAneous 4x Daily AC & HS Darion Wallace II, MD   2 Units at 12/23/24 1218    atorvastatin (LIPITOR) tablet 20 mg  20 mg Oral Nightly Darion Wallace II, MD   20 mg at 12/22/24 2054    aspirin chewable tablet 81 mg  81 mg Oral Daily Darion Wallace II, MD   81 mg at 12/23/24 0951    amLODIPine (NORVASC) tablet 10 mg  10 mg Oral Daily Darion Wallace II, MD   10 mg at 12/23/24 0951    gabapentin (NEURONTIN) capsule 100 mg  100 mg Oral BID Darion Wallace II, MD   100 mg at 12/23/24 0951    hydrALAZINE (APRESOLINE) tablet 100 mg  100 mg Oral 3 times per day Darion Wallace II, MD   100 mg at 12/23/24 1214    levothyroxine (SYNTHROID) tablet 75 mcg  75 mcg Oral Daily Darion Wallace II, MD   75 mcg at 12/23/24 0604    pantoprazole (PROTONIX) tablet 40 mg  40 mg Oral [facility] hospital Darion Wallace II, MD   
12/23/24 1014   Distance Tunneling (cm) 0 cm 12/23/24 1014   Tunneling Position ___ O'Clock 0 12/23/24 1014   Undermining Starts ___ O'Clock 0 12/23/24 1014   Undermining Ends___ O'Clock 0 12/23/24 1014   Undermining Maxium Distance (cm) 0 12/23/24 1014   Wound Assessment Pink/red 12/23/24 1014   Drainage Amount Moderate (25-50%) 12/23/24 1014   Drainage Description Serosanguinous 12/23/24 1014   Odor None 12/23/24 1014   Kimberly-wound Assessment Maceration 12/23/24 1014   Margins Defined edges 12/23/24 1014   Wound Thickness Description not for Pressure Injury Full thickness 12/23/24 1014   Number of days: 136       Wound 08/09/24 Coccyx (Active)   Number of days: 136       Wound 12/18/24 Heel Right (Active)   Wound Image   12/18/24 0856   Wound Etiology Pressure Unstageable 12/21/24 2143   Wound Cleansed Not Cleansed 12/23/24 0949   Dressing/Treatment Open to air 12/23/24 0949   Wound Length (cm) 2 cm 12/18/24 0856   Wound Width (cm) 2 cm 12/18/24 0856   Wound Depth (cm) 0.1 cm 12/18/24 0856   Wound Surface Area (cm^2) 4 cm^2 12/18/24 0856   Wound Volume (cm^3) 0.4 cm^3 12/18/24 0856   Distance Tunneling (cm) 0 cm 12/21/24 2143   Tunneling Position ___ O'Clock 0 12/21/24 2143   Undermining Starts ___ O'Clock 0 12/21/24 2143   Undermining Ends___ O'Clock 0 12/21/24 2143   Undermining Maxium Distance (cm) 0 12/21/24 2143   Wound Assessment Eschar dry 12/21/24 2143   Drainage Amount None (dry) 12/21/24 2143   Odor None 12/21/24 2143   Kimberly-wound Assessment Intact 12/21/24 2143   Margins Attached edges 12/21/24 2143   Number of days: 5       Response to treatment:  With complaints of pain.     Pain Assessment:  Severity:  moderate  Quality of pain: sore/painful to left heel  Wound Pain Timing/Severity: wound care  Premedicated: no    Plan:     Plan of Care: Wound 08/09/24 Heel Left-Dressing/Treatment: Negative pressure wound therapy (opticell ag, black foam x3)  [REMOVED] Wound 10/28/23 Groin Medial pinpoint opening 
Redistribution  [] Fluid Immersion  [] Bariatric  [] Total Pressure Relief  [] Other:     Discharge Plan:  Placement for patient upon discharge: tbd  Hospice Care: no  Patient appropriate for Outpatient [facility]ound Care Center: pt follows with Dr Espinoza.     Patient/Caregiver Teaching:  Level of patient/caregiver understanding able to: pt voiced understanding.         Electronically signed by Tammy Silva RN,  on 12/18/2024 at 10:59 AM

## 2024-12-24 NOTE — CARE COORDINATION
Reviewed chart, discussed in IDR and spoke with Shae at [facility], they have wound VAC at facility and pt can come anytime.  PS to Dr De Paz.

## 2025-01-12 ENCOUNTER — HOSPITAL ENCOUNTER (EMERGENCY)
Age: 85
Discharge: HOME | End: 2025-01-13
Attending: EMERGENCY MEDICINE
Payer: MEDICARE

## 2025-01-12 VITALS
SYSTOLIC BLOOD PRESSURE: 170 MMHG | DIASTOLIC BLOOD PRESSURE: 81 MMHG | BODY MASS INDEX: 28.8 KG/M2 | RESPIRATION RATE: 15 BRPM | HEART RATE: 82 BPM | TEMPERATURE: 97.2 F | WEIGHT: 190 LBS

## 2025-01-12 DIAGNOSIS — E11.649 HYPOGLYCEMIC EPISODE IN PATIENT WITH DIABETES MELLITUS (HCC): Primary | ICD-10-CM

## 2025-01-12 LAB
ALBUMIN SERPL-MCNC: 3.4 G/DL (ref 3.4–5)
ALBUMIN/GLOB SERPL: 0.8 {RATIO} (ref 1.1–2.2)
ALP SERPL-CCNC: 89 U/L (ref 40–129)
ALT SERPL-CCNC: 13 U/L (ref 10–40)
ANION GAP SERPL CALCULATED.3IONS-SCNC: 9 MMOL/L (ref 9–17)
AST SERPL-CCNC: 27 U/L (ref 15–37)
BASOPHILS # BLD: 0.04 K/UL
BASOPHILS NFR BLD: 0 % (ref 0–1)
BILIRUB SERPL-MCNC: <0.1 MG/DL (ref 0–1)
BUN SERPL-MCNC: 36 MG/DL (ref 7–20)
CALCIUM SERPL-MCNC: 9.2 MG/DL (ref 8.3–10.6)
CHLORIDE SERPL-SCNC: 111 MMOL/L (ref 99–110)
CHP ED QC CHECK: NORMAL
CO2 SERPL-SCNC: 24 MMOL/L (ref 21–32)
CREAT SERPL-MCNC: 2.3 MG/DL (ref 0.8–1.3)
EKG ATRIAL RATE: 70 BPM
EKG DIAGNOSIS: NORMAL
EKG P AXIS: 115 DEGREES
EKG P-R INTERVAL: 208 MS
EKG Q-T INTERVAL: 396 MS
EKG QRS DURATION: 72 MS
EKG QTC CALCULATION (BAZETT): 427 MS
EKG R AXIS: -18 DEGREES
EKG T AXIS: -9 DEGREES
EKG VENTRICULAR RATE: 70 BPM
EOSINOPHIL # BLD: 0.46 K/UL
EOSINOPHILS RELATIVE PERCENT: 5 % (ref 0–3)
ERYTHROCYTE [DISTWIDTH] IN BLOOD BY AUTOMATED COUNT: 14.6 % (ref 11.7–14.9)
GFR, ESTIMATED: 27 ML/MIN/1.73M2
GLUCOSE BLD-MCNC: 184 MG/DL (ref 74–99)
GLUCOSE BLD-MCNC: 46 MG/DL (ref 74–99)
GLUCOSE BLD-MCNC: 76 MG/DL
GLUCOSE BLD-MCNC: 76 MG/DL (ref 74–99)
GLUCOSE BLD-MCNC: 92 MG/DL (ref 74–99)
GLUCOSE SERPL-MCNC: 72 MG/DL (ref 74–99)
HCT VFR BLD AUTO: 26.1 % (ref 42–52)
HGB BLD-MCNC: 7.8 G/DL (ref 13.5–18)
IMM GRANULOCYTES # BLD AUTO: 0.04 K/UL
IMM GRANULOCYTES NFR BLD: 0 %
LYMPHOCYTES NFR BLD: 0.93 K/UL
LYMPHOCYTES RELATIVE PERCENT: 9 % (ref 24–44)
MCH RBC QN AUTO: 30 PG (ref 27–31)
MCHC RBC AUTO-ENTMCNC: 29.9 G/DL (ref 32–36)
MCV RBC AUTO: 100.4 FL (ref 78–100)
MONOCYTES NFR BLD: 0.68 K/UL
MONOCYTES NFR BLD: 7 % (ref 0–4)
NEUTROPHILS NFR BLD: 78 % (ref 36–66)
NEUTS SEG NFR BLD: 7.76 K/UL
PLATELET # BLD AUTO: 339 K/UL (ref 140–440)
PMV BLD AUTO: 9.8 FL (ref 7.5–11.1)
POTASSIUM SERPL-SCNC: 4.4 MMOL/L (ref 3.5–5.1)
PROT SERPL-MCNC: 7.5 G/DL (ref 6.4–8.2)
RBC # BLD AUTO: 2.6 M/UL (ref 4.6–6.2)
SODIUM SERPL-SCNC: 144 MMOL/L (ref 136–145)
WBC OTHER # BLD: 9.9 K/UL (ref 4–10.5)

## 2025-01-12 PROCEDURE — 93010 ELECTROCARDIOGRAM REPORT: CPT | Performed by: INTERNAL MEDICINE

## 2025-01-12 PROCEDURE — 82962 GLUCOSE BLOOD TEST: CPT

## 2025-01-12 PROCEDURE — 80053 COMPREHEN METABOLIC PANEL: CPT

## 2025-01-12 PROCEDURE — 93005 ELECTROCARDIOGRAM TRACING: CPT | Performed by: EMERGENCY MEDICINE

## 2025-01-12 PROCEDURE — 85025 COMPLETE CBC W/AUTO DIFF WBC: CPT

## 2025-01-12 PROCEDURE — 99284 EMERGENCY DEPT VISIT MOD MDM: CPT

## 2025-01-12 NOTE — DISCHARGE INSTRUCTIONS
Please continue rechecking your serum glucose today every 4 hours until bedtime.    Tomorrow, resume your medications as previously indicated.    Please make an appointment to be seen by your primary care doctor next week.    If at some point you have severe shortness of breath, severe nausea or vomiting unable to keep anything down, feels severely weak unable to stand up, feels confused or are lethargic unable to do your normal daily activities, or have any other concerning symptoms, please come back promptly to the emergency department.

## 2025-01-12 NOTE — ED PROVIDER NOTES
2:26 PM: Assumed care of patient at signout. For more details, please see note from same day. Briefly, Greg Easton is a 84 y.o. male with a PMH significant for type 2 diabetes, who presented to the emergency department for hypoglycemia into the 30s after receiving an unknown dose of an unknown type of insulin for postprandial hyperglycemia to the 300s this morning.      ED course summary:   -Here hemodynamically stable, with GCS of 15.  Initial altered mental status resolved after EMS provided an oral load of glucose.  -Here at arrival mildly hyperglycemic, received a lunch box, tolerated p.o. intake appropriately.  -Since then, has remained symptomatic.  -Reassuring labs    Plan at time of sign-out:   -Observation until 3:30 PM.    ED course since sign-out:  ED Course as of 01/13/25 0356   Sun Jan 12, 2025   1238 Glucose had dropped some, he is talking and alert, we are feeding him now.  [KA]   1321 Sugar had dropped a bit, improving with him eating, repeat is 92 at this time [KA]   1532 POC Glucose(!): 184  Patient is a good clinical conditions.  He is hemodynamically stable.  Has not had any recurrence of hypoglycemia, and is completely asymptomatic.  He tolerated p.o. intake.  Considering a reassuring workup and current clinical condition, at this time no need for for interventions in hospital.  Encouraged the patient to monitor his blood sugar every 4 hours until bedtime today, recommended him to resume his antiglycemic medications as previously indicated starting tomorrow, and gave him precautions to, promptly to the emergency department if it becomes necessary.  He will be discharged. [SC]      ED Course User Index  [KA] Milla Meyer MD  [SC] Kuldeep Martin MD         BP (!) 169/76   Pulse 69   Temp 97.2 °F (36.2 °C)   Resp 11   Labs:  Labs Reviewed   CBC WITH AUTO DIFFERENTIAL - Abnormal; Notable for the following components:       Result Value    RBC 2.60 (*)     Hemoglobin 7.8 (*)     
visit (if applicable):  Results for orders placed or performed during the hospital encounter of 01/12/25   CBC with Auto Differential   Result Value Ref Range    WBC 9.9 4.0 - 10.5 k/uL    RBC 2.60 (L) 4.60 - 6.20 m/uL    Hemoglobin 7.8 (L) 13.5 - 18.0 g/dL    Hematocrit 26.1 (L) 42.0 - 52.0 %    .4 (H) 78.0 - 100.0 fL    MCH 30.0 27.0 - 31.0 pg    MCHC 29.9 (L) 32.0 - 36.0 g/dL    RDW 14.6 11.7 - 14.9 %    Platelets 339 140 - 440 k/uL    MPV 9.8 7.5 - 11.1 fL    Neutrophils % 78 (H) 36 - 66 %    Lymphocytes % 9 (L) 24 - 44 %    Monocytes % 7 (H) 0 - 4 %    Eosinophils % 5 (H) 0.0 - 3.0 %    Basophils % 0 0 - 1 %    Immature Granulocytes % 0 0 %    Neutrophils Absolute 7.76 k/uL    Lymphocytes Absolute 0.93 k/uL    Monocytes Absolute 0.68 k/uL    Eosinophils Absolute 0.46 k/uL    Basophils Absolute 0.04 k/uL    Immature Granulocytes Absolute 0.04 k/uL   Comprehensive Metabolic Panel   Result Value Ref Range    Sodium 144 136 - 145 mmol/L    Potassium 4.4 3.5 - 5.1 mmol/L    Chloride 111 (H) 99 - 110 mmol/L    CO2 24 21 - 32 mmol/L    Anion Gap 9 9 - 17 mmol/L    Glucose 72 (L) 74 - 99 mg/dL    BUN 36 (H) 7 - 20 mg/dL    Creatinine 2.3 (H) 0.8 - 1.3 mg/dL    Est, Glom Filt Rate 27 (L) >60 mL/min/1.73m2    Calcium 9.2 8.3 - 10.6 mg/dL    Total Protein 7.5 6.4 - 8.2 g/dL    Albumin 3.4 3.4 - 5.0 g/dL    Albumin/Globulin Ratio 0.8 (L) 1.1 - 2.2    Total Bilirubin <0.1 0.0 - 1.0 mg/dL    Alkaline Phosphatase 89 40 - 129 U/L    ALT 13 10 - 40 U/L    AST 27 15 - 37 U/L   POC Blood Glucose   Result Value Ref Range    Glucose 76 mg/dL    QC OK? Y    POCT Glucose   Result Value Ref Range    POC Glucose 76 74 - 99 mg/dL   POCT Glucose   Result Value Ref Range    POC Glucose 46 (LL) 74 - 99 mg/dL   EKG 12 Lead   Result Value Ref Range    Ventricular Rate 70 BPM    Atrial Rate 70 BPM    P-R Interval 208 ms    QRS Duration 72 ms    Q-T Interval 396 ms    QTc Calculation (Bazett) 427 ms    P Axis 115 degrees    R Axis -18

## 2025-01-16 ENCOUNTER — OFFICE VISIT (OUTPATIENT)
Dept: ORTHOPEDIC SURGERY | Age: 85
End: 2025-01-16
Payer: MEDICARE

## 2025-01-16 VITALS — TEMPERATURE: 98.9 F | HEART RATE: 74 BPM | OXYGEN SATURATION: 93 %

## 2025-01-16 DIAGNOSIS — S42.231P: Primary | ICD-10-CM

## 2025-01-16 DIAGNOSIS — M19.011 OSTEOARTHRITIS, LOCALIZED, SHOULDER, RIGHT: ICD-10-CM

## 2025-01-16 PROCEDURE — 99213 OFFICE O/P EST LOW 20 MIN: CPT | Performed by: ORTHOPAEDIC SURGERY

## 2025-01-16 PROCEDURE — 1125F AMNT PAIN NOTED PAIN PRSNT: CPT | Performed by: ORTHOPAEDIC SURGERY

## 2025-01-16 PROCEDURE — 1111F DSCHRG MED/CURRENT MED MERGE: CPT | Performed by: ORTHOPAEDIC SURGERY

## 2025-01-16 PROCEDURE — G8427 DOCREV CUR MEDS BY ELIG CLIN: HCPCS | Performed by: ORTHOPAEDIC SURGERY

## 2025-01-16 PROCEDURE — 1123F ACP DISCUSS/DSCN MKR DOCD: CPT | Performed by: ORTHOPAEDIC SURGERY

## 2025-01-16 PROCEDURE — 1036F TOBACCO NON-USER: CPT | Performed by: ORTHOPAEDIC SURGERY

## 2025-01-16 PROCEDURE — G8417 CALC BMI ABV UP PARAM F/U: HCPCS | Performed by: ORTHOPAEDIC SURGERY

## 2025-01-16 NOTE — PATIENT INSTRUCTIONS
Continue weight-bearing as tolerated.  Continue range of motion exercises as instructed.  Ice and elevate as needed.  Tylenol or Motrin for pain.  Continue with physical therapy.   Follow up as needed    We are committed to providing you the best care possible.     If you receive a survey after visiting one of our offices, please take time to share your experience concerning your physician office visit.  These surveys are confidential and no health information about you is shared.     We are eager to improve for you and we are counting on your feedback to help make that happen. If you felt my care was outstanding, please mention me by name: Julia LAZAR

## 2025-01-16 NOTE — PROGRESS NOTES
Patient seen in office today for: Right humerus Fx, DOI 7/31/2024    Patient reports 5/10 pain.  RICE and medication are effective to alleviate pain and reduce swelling. Pain worsened by: Patient reports painful ROM & weight bearing.     Patient actively participates in physical therapy at nursing facility.     Xrays performed in office today.     Right handed

## 2025-01-20 ASSESSMENT — ENCOUNTER SYMPTOMS
CHEST TIGHTNESS: 0
SHORTNESS OF BREATH: 0
COLOR CHANGE: 0

## 2025-01-20 NOTE — PROGRESS NOTES
1/16/2025   Chief Complaint   Patient presents with    Follow-up    Shoulder Pain     Right humerus Fx, DOI 7/31/2024        History of Present Illness:                             Greg Easton is a 84 y.o. male who returns today for evaluation of his right shoulder pain and weakness following his fracture.  He had an injury on 7/31/2024.  He was treated nonoperatively with conservative measures regarding his proximal humerus fracture.    He is currently in a nursing facility working on range of motion and mobility and function on his right arm.  He denies any complications or problems or setbacks but continues to have stiffness and weakness and pain in the right shoulder and proximal arm.    Patient seen in office today for: Right humerus Fx, DOI 7/31/2024     Patient reports 5/10 pain.  RICE and medication are effective to alleviate pain and reduce swelling. Pain worsened by: Patient reports painful ROM & weight bearing.      Patient actively participates in physical therapy at nursing facility.      Xrays performed in office today.      Right handed        Medical History  Patient's medications, allergies, past medical, surgical, social and family histories were reviewed and updated as appropriate.      Review of Systems   Constitutional:  Negative for activity change and fever.   Respiratory:  Negative for chest tightness and shortness of breath.    Cardiovascular:  Negative for chest pain.   Skin:  Negative for color change.   Neurological:  Negative for dizziness.   Psychiatric/Behavioral:  The patient is not nervous/anxious.                                                Examination:  General Exam:  Vitals: Pulse 74   Temp 98.9 °F (37.2 °C)   SpO2 93%    Physical Exam   Right upper extremity:  Improved mild tenderness palpation proximal humerus.  Normal alignment of the shoulder and arm.  Sensation and motor functions intact throughout.  Active range of motion is present with limited forward  N/A

## 2025-02-22 ENCOUNTER — APPOINTMENT (OUTPATIENT)
Dept: CT IMAGING | Age: 85
End: 2025-02-22
Payer: MEDICARE

## 2025-02-22 ENCOUNTER — HOSPITAL ENCOUNTER (EMERGENCY)
Age: 85
Discharge: NURSING, REHAB OR LONG-TERM CARE | End: 2025-02-23
Payer: MEDICARE

## 2025-02-22 DIAGNOSIS — N18.9 CHRONIC KIDNEY DISEASE, UNSPECIFIED CKD STAGE: ICD-10-CM

## 2025-02-22 DIAGNOSIS — E87.5 HYPERKALEMIA: ICD-10-CM

## 2025-02-22 DIAGNOSIS — K59.00 CONSTIPATION, UNSPECIFIED CONSTIPATION TYPE: Primary | ICD-10-CM

## 2025-02-22 LAB
ALBUMIN SERPL-MCNC: 3.5 G/DL (ref 3.4–5)
ALBUMIN/GLOB SERPL: 0.8 {RATIO} (ref 1.1–2.2)
ALP SERPL-CCNC: 107 U/L (ref 40–129)
ALT SERPL-CCNC: 9 U/L (ref 10–40)
ANION GAP SERPL CALCULATED.3IONS-SCNC: 11 MMOL/L (ref 9–17)
AST SERPL-CCNC: 21 U/L (ref 15–37)
BASOPHILS # BLD: 0.04 K/UL
BASOPHILS NFR BLD: 0 % (ref 0–1)
BILIRUB SERPL-MCNC: 0.2 MG/DL (ref 0–1)
BUN SERPL-MCNC: 40 MG/DL (ref 7–20)
CALCIUM SERPL-MCNC: 9.3 MG/DL (ref 8.3–10.6)
CHLORIDE SERPL-SCNC: 108 MMOL/L (ref 99–110)
CO2 SERPL-SCNC: 22 MMOL/L (ref 21–32)
CREAT SERPL-MCNC: 2.6 MG/DL (ref 0.8–1.3)
EOSINOPHIL # BLD: 0.62 K/UL
EOSINOPHILS RELATIVE PERCENT: 6 % (ref 0–3)
ERYTHROCYTE [DISTWIDTH] IN BLOOD BY AUTOMATED COUNT: 15 % (ref 11.7–14.9)
GFR, ESTIMATED: 24 ML/MIN/1.73M2
GLUCOSE SERPL-MCNC: 205 MG/DL (ref 74–99)
HCT VFR BLD AUTO: 30.3 % (ref 42–52)
HGB BLD-MCNC: 8.6 G/DL (ref 13.5–18)
IMM GRANULOCYTES # BLD AUTO: 0.03 K/UL
IMM GRANULOCYTES NFR BLD: 0 %
LYMPHOCYTES NFR BLD: 2.18 K/UL
LYMPHOCYTES RELATIVE PERCENT: 22 % (ref 24–44)
MCH RBC QN AUTO: 28.9 PG (ref 27–31)
MCHC RBC AUTO-ENTMCNC: 28.4 G/DL (ref 32–36)
MCV RBC AUTO: 101.7 FL (ref 78–100)
MONOCYTES NFR BLD: 0.65 K/UL
MONOCYTES NFR BLD: 7 % (ref 0–4)
NEUTROPHILS NFR BLD: 64 % (ref 36–66)
NEUTS SEG NFR BLD: 6.21 K/UL
PLATELET # BLD AUTO: 258 K/UL (ref 140–440)
PMV BLD AUTO: 11.4 FL (ref 7.5–11.1)
POTASSIUM SERPL-SCNC: 5.3 MMOL/L (ref 3.5–5.1)
PROT SERPL-MCNC: 7.7 G/DL (ref 6.4–8.2)
RBC # BLD AUTO: 2.98 M/UL (ref 4.6–6.2)
SODIUM SERPL-SCNC: 141 MMOL/L (ref 136–145)
WBC OTHER # BLD: 9.7 K/UL (ref 4–10.5)

## 2025-02-22 PROCEDURE — 80053 COMPREHEN METABOLIC PANEL: CPT

## 2025-02-22 PROCEDURE — 99284 EMERGENCY DEPT VISIT MOD MDM: CPT

## 2025-02-22 PROCEDURE — 74176 CT ABD & PELVIS W/O CONTRAST: CPT

## 2025-02-22 PROCEDURE — 85025 COMPLETE CBC W/AUTO DIFF WBC: CPT

## 2025-02-22 ASSESSMENT — PAIN DESCRIPTION - DESCRIPTORS: DESCRIPTORS: ACHING

## 2025-02-22 ASSESSMENT — PAIN DESCRIPTION - LOCATION: LOCATION: SHOULDER

## 2025-02-22 ASSESSMENT — PAIN SCALES - GENERAL: PAINLEVEL_OUTOF10: 7

## 2025-02-22 ASSESSMENT — PAIN - FUNCTIONAL ASSESSMENT: PAIN_FUNCTIONAL_ASSESSMENT: 0-10

## 2025-02-22 ASSESSMENT — PAIN DESCRIPTION - ORIENTATION: ORIENTATION: RIGHT

## 2025-02-23 VITALS
OXYGEN SATURATION: 99 % | HEIGHT: 68 IN | RESPIRATION RATE: 16 BRPM | WEIGHT: 190 LBS | HEART RATE: 71 BPM | SYSTOLIC BLOOD PRESSURE: 159 MMHG | BODY MASS INDEX: 28.79 KG/M2 | TEMPERATURE: 97.7 F | DIASTOLIC BLOOD PRESSURE: 70 MMHG

## 2025-02-23 LAB
EKG ATRIAL RATE: 64 BPM
EKG DIAGNOSIS: NORMAL
EKG P AXIS: 65 DEGREES
EKG P-R INTERVAL: 226 MS
EKG Q-T INTERVAL: 396 MS
EKG QRS DURATION: 70 MS
EKG QTC CALCULATION (BAZETT): 408 MS
EKG R AXIS: -13 DEGREES
EKG T AXIS: -7 DEGREES
EKG VENTRICULAR RATE: 64 BPM

## 2025-02-23 PROCEDURE — 6370000000 HC RX 637 (ALT 250 FOR IP): Performed by: NURSE PRACTITIONER

## 2025-02-23 PROCEDURE — 93005 ELECTROCARDIOGRAM TRACING: CPT | Performed by: NURSE PRACTITIONER

## 2025-02-23 PROCEDURE — 93010 ELECTROCARDIOGRAM REPORT: CPT | Performed by: INTERNAL MEDICINE

## 2025-02-23 RX ADMIN — SODIUM ZIRCONIUM CYCLOSILICATE 10 G: 5 POWDER, FOR SUSPENSION ORAL at 00:09

## 2025-02-23 ASSESSMENT — PAIN - FUNCTIONAL ASSESSMENT: PAIN_FUNCTIONAL_ASSESSMENT: NONE - DENIES PAIN

## 2025-02-23 ASSESSMENT — PAIN SCALES - GENERAL: PAINLEVEL_OUTOF10: 0

## 2025-02-23 NOTE — ED TRIAGE NOTES
Patient to ED for constipation. Patient had an abdominal xray at nursing home that showed the beginning of a small bowel obstruction. Patient denies abdominal pain.

## 2025-02-23 NOTE — DISCHARGE INSTRUCTIONS
Patient's evaluation did not reveal any obstruction or impaction of stool.  Can continue treatment of constipation with stool softeners, laxatives, and/or enema as directed by house staff

## 2025-02-23 NOTE — ED PROVIDER NOTES
EKG per my interpretation demonstrates normal sinus rhythm at a rate of 64 bpm.  Normal axis.  Mildly prolonged WA interval with first-degree AV block.  No acute ST segment changes.     Pablo May,   02/23/25 0047    
01/01/2011    Hypertension     Hypertensive heart disease with CHF (congestive heart failure) (HCC)     Hypotension     Immunodeficiency     Metabolic encephalopathy     Muscle weakness     Osteoarthritis 01/01/2011    Osteoarthritis     Secondary Hyperaldosteronism (HCC)     Supraventricular tachycardia     Tubulo-interstitial nephritis        SURGICAL HISTORY     Past Surgical History:   Procedure Laterality Date    BLADDER SURGERY      BLADDER SURGERY Left 03/17/2022    CYSTOSCOPY LEFT STENT EXCHANGE performed by Bal Mckeon MD at [facility] OR    CHANGE OF BLADDER TUBE,COMPLICATED  5/12/2024    COLON SURGERY      CYSTOSCOPY Left 09/07/2022    LEFT CYSTOSCOPY URETERAL STENT EXCHANGE AND SUPRABUPIC PACE CATHETER EXCHANGE performed by Mirella Wilkins MD at [facility] OR    CYSTOSCOPY Left 10/31/2023    CYSTOSCOPY, LEFT URETERAL STENT EXCHANGE, SUPRAPUBIC TUBE EXCHANGE performed by Mirella Wilkins MD at [facility] OR    CYSTOSCOPY Left 5/13/2024    CYSTOSCOPY URETERAL STENT EXCHANGE performed by Mirella Wilkins MD at [facility] OR    CYSTOSCOPY Left 12/10/2024    LEFT CYSTOSCOPY URETERAL STENT EXCHANGE performed by Mirella Wilkins MD at [facility] OR    FOOT DEBRIDEMENT Left 12/18/2024    FOOT DEBRIDEMENT INCISION AND DRAINAGE performed by Darion Wallace II, MD at [facility] OR    OTHER SURGICAL HISTORY  03/28/2013    Cysto with removal of artificial sphinter and placement of suprapubic catheter.    PROSTATE SURGERY      PROSTATECTOMY  1996    infection - had cancer    UPPER GASTROINTESTINAL ENDOSCOPY N/A 8/8/2024    ESOPHAGOGASTRODUODENOSCOPY DILATION BALLOON 15MM-18MM UP TO 18MM AND BIOPSY performed by Sallie Way MD at [facility] ENDOSCOPY       CURRENTMEDICATIONS       Previous Medications    ACETAMINOPHEN (TYLENOL) 325 MG TABLET    Take 2 tablets by mouth every 4 hours as needed for Pain    ALBUTEROL SULFATE HFA (VENTOLIN HFA) 108 (90 BASE) MCG/ACT INHALER    Inhale 1 puff into the lungs 4 times daily Indications:

## 2025-04-13 ENCOUNTER — HOSPITAL ENCOUNTER (EMERGENCY)
Age: 85
Discharge: HOME | End: 2025-04-13
Attending: EMERGENCY MEDICINE
Payer: MEDICARE

## 2025-04-13 VITALS
RESPIRATION RATE: 16 BRPM | DIASTOLIC BLOOD PRESSURE: 60 MMHG | HEIGHT: 68 IN | HEART RATE: 65 BPM | BODY MASS INDEX: 28.79 KG/M2 | SYSTOLIC BLOOD PRESSURE: 152 MMHG | OXYGEN SATURATION: 98 % | WEIGHT: 190 LBS | TEMPERATURE: 97.8 F

## 2025-04-13 DIAGNOSIS — E16.2 HYPOGLYCEMIA: Primary | ICD-10-CM

## 2025-04-13 LAB
ALBUMIN SERPL-MCNC: 3.4 G/DL (ref 3.4–5)
ALBUMIN/GLOB SERPL: 0.9 {RATIO} (ref 1.1–2.2)
ALP SERPL-CCNC: 103 U/L (ref 40–129)
ALT SERPL-CCNC: 18 U/L (ref 10–40)
ANION GAP SERPL CALCULATED.3IONS-SCNC: 13 MMOL/L (ref 9–17)
AST SERPL-CCNC: 22 U/L (ref 15–37)
BASOPHILS # BLD: 0.03 K/UL
BASOPHILS NFR BLD: 0 % (ref 0–1)
BILIRUB SERPL-MCNC: <0.2 MG/DL (ref 0–1)
BUN SERPL-MCNC: 41 MG/DL (ref 7–20)
CALCIUM SERPL-MCNC: 9 MG/DL (ref 8.3–10.6)
CHLORIDE SERPL-SCNC: 107 MMOL/L (ref 99–110)
CHP ED QC CHECK: NORMAL
CHP ED QC CHECK: YES
CO2 SERPL-SCNC: 20 MMOL/L (ref 21–32)
CREAT SERPL-MCNC: 3 MG/DL (ref 0.8–1.3)
EOSINOPHIL # BLD: 0.41 K/UL
EOSINOPHILS RELATIVE PERCENT: 4 % (ref 0–3)
ERYTHROCYTE [DISTWIDTH] IN BLOOD BY AUTOMATED COUNT: 15 % (ref 11.7–14.9)
GFR, ESTIMATED: 20 ML/MIN/1.73M2
GLUCOSE BLD-MCNC: 122 MG/DL
GLUCOSE BLD-MCNC: 122 MG/DL (ref 74–99)
GLUCOSE BLD-MCNC: 182 MG/DL (ref 74–99)
GLUCOSE BLD-MCNC: 230 MG/DL
GLUCOSE BLD-MCNC: 230 MG/DL (ref 74–99)
GLUCOSE SERPL-MCNC: 84 MG/DL (ref 74–99)
HCT VFR BLD AUTO: 26 % (ref 42–52)
HGB BLD-MCNC: 7.7 G/DL (ref 13.5–18)
IMM GRANULOCYTES # BLD AUTO: 0.04 K/UL
IMM GRANULOCYTES NFR BLD: 0 %
LYMPHOCYTES NFR BLD: 1.12 K/UL
LYMPHOCYTES RELATIVE PERCENT: 12 % (ref 24–44)
MCH RBC QN AUTO: 30.1 PG (ref 27–31)
MCHC RBC AUTO-ENTMCNC: 29.6 G/DL (ref 32–36)
MCV RBC AUTO: 101.6 FL (ref 78–100)
MONOCYTES NFR BLD: 0.92 K/UL
MONOCYTES NFR BLD: 10 % (ref 0–4)
NEUTROPHILS NFR BLD: 73 % (ref 36–66)
NEUTS SEG NFR BLD: 6.85 K/UL
PLATELET # BLD AUTO: 244 K/UL (ref 140–440)
PMV BLD AUTO: 11 FL (ref 7.5–11.1)
POTASSIUM SERPL-SCNC: 4.5 MMOL/L (ref 3.5–5.1)
PROT SERPL-MCNC: 7.1 G/DL (ref 6.4–8.2)
RBC # BLD AUTO: 2.56 M/UL (ref 4.6–6.2)
SODIUM SERPL-SCNC: 139 MMOL/L (ref 136–145)
WBC OTHER # BLD: 9.4 K/UL (ref 4–10.5)

## 2025-04-13 PROCEDURE — 80053 COMPREHEN METABOLIC PANEL: CPT

## 2025-04-13 PROCEDURE — 99284 EMERGENCY DEPT VISIT MOD MDM: CPT

## 2025-04-13 PROCEDURE — 82962 GLUCOSE BLOOD TEST: CPT

## 2025-04-13 PROCEDURE — 36415 COLL VENOUS BLD VENIPUNCTURE: CPT

## 2025-04-13 PROCEDURE — 85025 COMPLETE CBC W/AUTO DIFF WBC: CPT

## 2025-04-13 ASSESSMENT — PAIN DESCRIPTION - LOCATION: LOCATION: LEG

## 2025-04-13 ASSESSMENT — PAIN - FUNCTIONAL ASSESSMENT
PAIN_FUNCTIONAL_ASSESSMENT: NONE - DENIES PAIN
PAIN_FUNCTIONAL_ASSESSMENT: 0-10

## 2025-04-13 ASSESSMENT — PAIN SCALES - GENERAL: PAINLEVEL_OUTOF10: 4

## 2025-04-13 NOTE — ED TRIAGE NOTES
Patient to rm. 20 via EMS with c/o low blood sugar. Per EMS, they were originally called to [facility] for unresponsiveness. On their arrival, patients blood sugar was found to be 33. Patient was given glucose and dextrose per EMS and blood sugar increased to 159. Patient is currently A & O x 4 and is only c/o chronic pain in bilateral heels. Resps even and unlabored.

## 2025-04-13 NOTE — ED PROVIDER NOTES
[facility] EMERGENCY DEPARTMENT  EMERGENCY DEPARTMENT ENCOUNTER      Pt Name: Greg Easton  MRN: [id number]  Birthdate 1940  Date of evaluation: 4/13/2025  Provider: Masha Rubio MD    CHIEF COMPLAINT       Chief Complaint   Patient presents with    Hypoglycemia     Patient was found at [facility] with a blood sugar of 33. Patient was given dextrose and glucose per EMS and blood sugar increased to 159. Patient is currently A & O x 4 and is only c/o chronic pain.          HISTORY OF PRESENT ILLNESS      Greg Easton is a 84 y.o. male who presents to the emergency department  for   Chief Complaint   Patient presents with    Hypoglycemia     Patient was found at [facility] with a blood sugar of 33. Patient was given dextrose and glucose per EMS and blood sugar increased to 159. Patient is currently A & O x 4 and is only c/o chronic pain.        84-year-old male presents after a hypoglycemic episode at his facility.  He is on a sliding scale of insulin.  Reportedly he became less responsive at this facility.  Ultimately EMS was called to scene and checked his blood sugar was low at 33.  He was given some glucose replacement and then his mental status improved.  Is brought to the emergency department for evaluation.  He is not on any other hypoglycemic medications.  He states that he was at some family event today and did not eat very much.  Unclear if his insulin dosing was adjusted based on this.  He was alert and oriented in the emergency department.  Does not have any acute pain complaints.  No respiratory symptoms.          Nursing Notes, Triage Notes & Vital Signs were reviewed.      REVIEW OF SYSTEMS    (2-9 systems for level 4, 10 or more for level 5)     Review of Systems   Neurological:  Negative for weakness.       Except as noted above the remainder of the review of systems was reviewed and negative.       PAST MEDICAL HISTORY     Past Medical History:   Diagnosis Date

## 2025-04-13 NOTE — DISCHARGE INSTRUCTIONS
Please make sure that you are eating sufficiently each day to avoid any episodes of low blood sugar.    Please talk with your prescribing physician about your sliding scale for insulin as it may need to be adjusted.    If you develop any worsening or concerning symptoms, please seek immediate medical evaluation

## 2025-05-15 ENCOUNTER — HOSPITAL ENCOUNTER (INPATIENT)
Age: 85
LOS: 11 days | Discharge: HOSPICE | DRG: 659 | End: 2025-05-26
Attending: INTERNAL MEDICINE | Admitting: INTERNAL MEDICINE
Payer: MEDICARE

## 2025-05-15 ENCOUNTER — APPOINTMENT (OUTPATIENT)
Dept: CT IMAGING | Age: 85
DRG: 659 | End: 2025-05-15
Payer: MEDICARE

## 2025-05-15 DIAGNOSIS — D64.9 CHRONIC ANEMIA: ICD-10-CM

## 2025-05-15 DIAGNOSIS — A41.9 SEPSIS, DUE TO UNSPECIFIED ORGANISM, UNSPECIFIED WHETHER ACUTE ORGAN DYSFUNCTION PRESENT (HCC): Primary | ICD-10-CM

## 2025-05-15 DIAGNOSIS — G93.40 ACUTE ENCEPHALOPATHY: ICD-10-CM

## 2025-05-15 DIAGNOSIS — N18.31 STAGE 3A CHRONIC KIDNEY DISEASE (HCC): ICD-10-CM

## 2025-05-15 DIAGNOSIS — N39.0 COMPLICATED UTI (URINARY TRACT INFECTION): ICD-10-CM

## 2025-05-15 LAB
ALBUMIN SERPL-MCNC: 3.6 G/DL (ref 3.4–5)
ALBUMIN/GLOB SERPL: 1 {RATIO} (ref 1.1–2.2)
ALP SERPL-CCNC: 105 U/L (ref 40–129)
ALT SERPL-CCNC: 13 U/L (ref 10–40)
AMMONIA PLAS-SCNC: 31 UMOL/L (ref 16–60)
ANION GAP SERPL CALCULATED.3IONS-SCNC: 15 MMOL/L (ref 9–17)
AST SERPL-CCNC: 14 U/L (ref 15–37)
BASOPHILS # BLD: 0.06 K/UL
BASOPHILS NFR BLD: 1 % (ref 0–1)
BILIRUB SERPL-MCNC: 0.2 MG/DL (ref 0–1)
BILIRUB UR QL STRIP: NEGATIVE
BNP SERPL-MCNC: 7265 PG/ML (ref 0–450)
BUN SERPL-MCNC: 57 MG/DL (ref 7–20)
CALCIUM SERPL-MCNC: 9.4 MG/DL (ref 8.3–10.6)
CHARACTER UR: ABNORMAL
CHLORIDE SERPL-SCNC: 109 MMOL/L (ref 99–110)
CLARITY UR: ABNORMAL
CO2 SERPL-SCNC: 19 MMOL/L (ref 21–32)
COLOR UR: YELLOW
CREAT SERPL-MCNC: 2.9 MG/DL (ref 0.8–1.3)
CRP SERPL HS-MCNC: 90.3 MG/L (ref 0–5)
EOSINOPHIL # BLD: 0.47 K/UL
EOSINOPHILS RELATIVE PERCENT: 5 % (ref 0–3)
EPI CELLS #/AREA URNS HPF: 6 /HPF
ERYTHROCYTE [DISTWIDTH] IN BLOOD BY AUTOMATED COUNT: 15.4 % (ref 11.7–14.9)
GFR, ESTIMATED: 21 ML/MIN/1.73M2
GLUCOSE SERPL-MCNC: 266 MG/DL (ref 74–99)
GLUCOSE UR STRIP-MCNC: 250 MG/DL
HCT VFR BLD AUTO: 28.3 % (ref 42–52)
HGB BLD-MCNC: 8.9 G/DL (ref 13.5–18)
HGB UR QL STRIP.AUTO: ABNORMAL
IMM GRANULOCYTES # BLD AUTO: 0.03 K/UL
IMM GRANULOCYTES NFR BLD: 0 %
KETONES UR STRIP-MCNC: ABNORMAL MG/DL
LACTATE BLDV-SCNC: 1.1 MMOL/L (ref 0.4–2)
LEUKOCYTE ESTERASE UR QL STRIP: ABNORMAL
LYMPHOCYTES NFR BLD: 1.48 K/UL
LYMPHOCYTES RELATIVE PERCENT: 16 % (ref 24–44)
MCH RBC QN AUTO: 30.7 PG (ref 27–31)
MCHC RBC AUTO-ENTMCNC: 31.4 G/DL (ref 32–36)
MCV RBC AUTO: 97.6 FL (ref 78–100)
MONOCYTES NFR BLD: 0.79 K/UL
MONOCYTES NFR BLD: 9 % (ref 0–5)
NEUTROPHILS NFR BLD: 69 % (ref 36–66)
NEUTS SEG NFR BLD: 6.19 K/UL
NITRITE UR QL STRIP: NEGATIVE
PH UR STRIP: 5.5 [PH] (ref 5–8)
PLATELET # BLD AUTO: 247 K/UL (ref 140–440)
PMV BLD AUTO: 11 FL (ref 7.5–11.1)
POTASSIUM SERPL-SCNC: 5.3 MMOL/L (ref 3.5–5.1)
PROCALCITONIN SERPL-MCNC: 0.81 NG/ML
PROT SERPL-MCNC: 7.1 G/DL (ref 6.4–8.2)
PROT UR STRIP-MCNC: >=300 MG/DL
RBC # BLD AUTO: 2.9 M/UL (ref 4.6–6.2)
RBC #/AREA URNS HPF: 53 /HPF (ref 0–2)
SODIUM SERPL-SCNC: 142 MMOL/L (ref 136–145)
SP GR UR STRIP: 1.02 (ref 1–1.03)
UROBILINOGEN UR STRIP-ACNC: 0.2 EU/DL (ref 0–1)
WBC #/AREA URNS HPF: 2421 /HPF (ref 0–5)
WBC OTHER # BLD: 9 K/UL (ref 4–10.5)
YEAST URNS QL MICRO: ABNORMAL

## 2025-05-15 PROCEDURE — 6360000002 HC RX W HCPCS: Performed by: INTERNAL MEDICINE

## 2025-05-15 PROCEDURE — 84145 PROCALCITONIN (PCT): CPT

## 2025-05-15 PROCEDURE — 6370000000 HC RX 637 (ALT 250 FOR IP): Performed by: INTERNAL MEDICINE

## 2025-05-15 PROCEDURE — 2140000000 HC CCU INTERMEDIATE R&B

## 2025-05-15 PROCEDURE — 94761 N-INVAS EAR/PLS OXIMETRY MLT: CPT

## 2025-05-15 PROCEDURE — 2580000003 HC RX 258: Performed by: INTERNAL MEDICINE

## 2025-05-15 PROCEDURE — 36415 COLL VENOUS BLD VENIPUNCTURE: CPT

## 2025-05-15 PROCEDURE — 2580000003 HC RX 258: Performed by: NURSE PRACTITIONER

## 2025-05-15 PROCEDURE — 87186 SC STD MICRODIL/AGAR DIL: CPT

## 2025-05-15 PROCEDURE — 82140 ASSAY OF AMMONIA: CPT

## 2025-05-15 PROCEDURE — 83880 ASSAY OF NATRIURETIC PEPTIDE: CPT

## 2025-05-15 PROCEDURE — 70450 CT HEAD/BRAIN W/O DYE: CPT

## 2025-05-15 PROCEDURE — 85025 COMPLETE CBC W/AUTO DIFF WBC: CPT

## 2025-05-15 PROCEDURE — 2500000003 HC RX 250 WO HCPCS: Performed by: INTERNAL MEDICINE

## 2025-05-15 PROCEDURE — 99285 EMERGENCY DEPT VISIT HI MDM: CPT

## 2025-05-15 PROCEDURE — 6360000002 HC RX W HCPCS: Performed by: NURSE PRACTITIONER

## 2025-05-15 PROCEDURE — 87040 BLOOD CULTURE FOR BACTERIA: CPT

## 2025-05-15 PROCEDURE — 81001 URINALYSIS AUTO W/SCOPE: CPT

## 2025-05-15 PROCEDURE — 86140 C-REACTIVE PROTEIN: CPT

## 2025-05-15 PROCEDURE — 80053 COMPREHEN METABOLIC PANEL: CPT

## 2025-05-15 PROCEDURE — 87086 URINE CULTURE/COLONY COUNT: CPT

## 2025-05-15 PROCEDURE — 83605 ASSAY OF LACTIC ACID: CPT

## 2025-05-15 PROCEDURE — 87077 CULTURE AEROBIC IDENTIFY: CPT

## 2025-05-15 RX ORDER — ENOXAPARIN SODIUM 100 MG/ML
30 INJECTION SUBCUTANEOUS DAILY
Status: DISCONTINUED | OUTPATIENT
Start: 2025-05-16 | End: 2025-05-26 | Stop reason: HOSPADM

## 2025-05-15 RX ORDER — METOPROLOL TARTRATE 25 MG/1
25 TABLET, FILM COATED ORAL 2 TIMES DAILY
COMMUNITY
Start: 2025-03-24

## 2025-05-15 RX ORDER — METOPROLOL TARTRATE 25 MG/1
25 TABLET, FILM COATED ORAL 2 TIMES DAILY
Status: DISCONTINUED | OUTPATIENT
Start: 2025-05-15 | End: 2025-05-26 | Stop reason: HOSPADM

## 2025-05-15 RX ORDER — SENNOSIDES 8.6 MG/1
2 TABLET ORAL DAILY
Status: DISCONTINUED | OUTPATIENT
Start: 2025-05-15 | End: 2025-05-26 | Stop reason: HOSPADM

## 2025-05-15 RX ORDER — SODIUM CHLORIDE 9 MG/ML
INJECTION, SOLUTION INTRAVENOUS CONTINUOUS
Status: DISPENSED | OUTPATIENT
Start: 2025-05-15 | End: 2025-05-16

## 2025-05-15 RX ORDER — SODIUM CHLORIDE 0.9 % (FLUSH) 0.9 %
5-40 SYRINGE (ML) INJECTION PRN
Status: DISCONTINUED | OUTPATIENT
Start: 2025-05-15 | End: 2025-05-26 | Stop reason: HOSPADM

## 2025-05-15 RX ORDER — AMLODIPINE BESYLATE 10 MG/1
10 TABLET ORAL DAILY
Status: DISCONTINUED | OUTPATIENT
Start: 2025-05-15 | End: 2025-05-26 | Stop reason: HOSPADM

## 2025-05-15 RX ORDER — TRAMADOL HYDROCHLORIDE 50 MG/1
50 TABLET ORAL EVERY 12 HOURS
Status: DISCONTINUED | OUTPATIENT
Start: 2025-05-15 | End: 2025-05-26 | Stop reason: HOSPADM

## 2025-05-15 RX ORDER — ONDANSETRON 2 MG/ML
4 INJECTION INTRAMUSCULAR; INTRAVENOUS EVERY 6 HOURS PRN
Status: DISCONTINUED | OUTPATIENT
Start: 2025-05-15 | End: 2025-05-26 | Stop reason: HOSPADM

## 2025-05-15 RX ORDER — MAGNESIUM SULFATE IN WATER 40 MG/ML
2000 INJECTION, SOLUTION INTRAVENOUS PRN
Status: DISCONTINUED | OUTPATIENT
Start: 2025-05-15 | End: 2025-05-26 | Stop reason: HOSPADM

## 2025-05-15 RX ORDER — METOCLOPRAMIDE 10 MG/1
10 TABLET ORAL 3 TIMES DAILY
Status: ON HOLD | COMMUNITY
Start: 2025-05-08 | End: 2025-05-26

## 2025-05-15 RX ORDER — LABETALOL HYDROCHLORIDE 5 MG/ML
15 INJECTION, SOLUTION INTRAVENOUS EVERY 4 HOURS PRN
Status: DISCONTINUED | OUTPATIENT
Start: 2025-05-15 | End: 2025-05-26 | Stop reason: HOSPADM

## 2025-05-15 RX ORDER — POTASSIUM CHLORIDE 7.45 MG/ML
10 INJECTION INTRAVENOUS PRN
Status: DISCONTINUED | OUTPATIENT
Start: 2025-05-15 | End: 2025-05-19

## 2025-05-15 RX ORDER — CLONIDINE HYDROCHLORIDE 0.2 MG/1
0.2 TABLET ORAL 2 TIMES DAILY
COMMUNITY
Start: 2025-05-08

## 2025-05-15 RX ORDER — PANTOPRAZOLE SODIUM 40 MG/1
40 TABLET, DELAYED RELEASE ORAL
Status: DISCONTINUED | OUTPATIENT
Start: 2025-05-16 | End: 2025-05-26 | Stop reason: HOSPADM

## 2025-05-15 RX ORDER — 0.9 % SODIUM CHLORIDE 0.9 %
1000 INTRAVENOUS SOLUTION INTRAVENOUS ONCE
Status: DISCONTINUED | OUTPATIENT
Start: 2025-05-15 | End: 2025-05-15

## 2025-05-15 RX ORDER — HYDRALAZINE HYDROCHLORIDE 50 MG/1
100 TABLET, FILM COATED ORAL EVERY 8 HOURS SCHEDULED
Status: DISCONTINUED | OUTPATIENT
Start: 2025-05-15 | End: 2025-05-26 | Stop reason: HOSPADM

## 2025-05-15 RX ORDER — LEVOTHYROXINE SODIUM 88 UG/1
88 TABLET ORAL DAILY
Status: DISCONTINUED | OUTPATIENT
Start: 2025-05-16 | End: 2025-05-26 | Stop reason: HOSPADM

## 2025-05-15 RX ORDER — ASPIRIN 81 MG/1
81 TABLET, CHEWABLE ORAL NIGHTLY
Status: DISCONTINUED | OUTPATIENT
Start: 2025-05-15 | End: 2025-05-26 | Stop reason: HOSPADM

## 2025-05-15 RX ORDER — ONDANSETRON 4 MG/1
4 TABLET, ORALLY DISINTEGRATING ORAL EVERY 8 HOURS PRN
Status: DISCONTINUED | OUTPATIENT
Start: 2025-05-15 | End: 2025-05-26 | Stop reason: HOSPADM

## 2025-05-15 RX ORDER — POLYETHYLENE GLYCOL 3350 17 G/17G
17 POWDER, FOR SOLUTION ORAL DAILY PRN
Status: DISCONTINUED | OUTPATIENT
Start: 2025-05-15 | End: 2025-05-26 | Stop reason: HOSPADM

## 2025-05-15 RX ORDER — ACETAMINOPHEN 325 MG/1
650 TABLET ORAL EVERY 6 HOURS PRN
Status: DISCONTINUED | OUTPATIENT
Start: 2025-05-15 | End: 2025-05-26 | Stop reason: HOSPADM

## 2025-05-15 RX ORDER — FERROUS SULFATE 325(65) MG
325 TABLET ORAL
Status: DISCONTINUED | OUTPATIENT
Start: 2025-05-16 | End: 2025-05-26 | Stop reason: HOSPADM

## 2025-05-15 RX ORDER — ATORVASTATIN CALCIUM 10 MG/1
20 TABLET, FILM COATED ORAL NIGHTLY
Status: DISCONTINUED | OUTPATIENT
Start: 2025-05-15 | End: 2025-05-26 | Stop reason: HOSPADM

## 2025-05-15 RX ORDER — ENOXAPARIN SODIUM 100 MG/ML
40 INJECTION SUBCUTANEOUS DAILY
Status: DISCONTINUED | OUTPATIENT
Start: 2025-05-16 | End: 2025-05-15 | Stop reason: DRUGHIGH

## 2025-05-15 RX ORDER — SODIUM CHLORIDE 9 MG/ML
INJECTION, SOLUTION INTRAVENOUS PRN
Status: DISCONTINUED | OUTPATIENT
Start: 2025-05-15 | End: 2025-05-26 | Stop reason: HOSPADM

## 2025-05-15 RX ORDER — ALBUTEROL SULFATE 90 UG/1
1 INHALANT RESPIRATORY (INHALATION) EVERY 4 HOURS PRN
Status: DISCONTINUED | OUTPATIENT
Start: 2025-05-15 | End: 2025-05-26 | Stop reason: HOSPADM

## 2025-05-15 RX ORDER — CLONIDINE HYDROCHLORIDE 0.2 MG/1
0.2 TABLET ORAL 2 TIMES DAILY
Status: DISCONTINUED | OUTPATIENT
Start: 2025-05-15 | End: 2025-05-24

## 2025-05-15 RX ORDER — METOCLOPRAMIDE 10 MG/1
10 TABLET ORAL 3 TIMES DAILY
Status: DISCONTINUED | OUTPATIENT
Start: 2025-05-15 | End: 2025-05-26 | Stop reason: HOSPADM

## 2025-05-15 RX ORDER — ACETAMINOPHEN 650 MG/1
650 SUPPOSITORY RECTAL EVERY 6 HOURS PRN
Status: DISCONTINUED | OUTPATIENT
Start: 2025-05-15 | End: 2025-05-26 | Stop reason: HOSPADM

## 2025-05-15 RX ORDER — POTASSIUM CHLORIDE 1500 MG/1
40 TABLET, EXTENDED RELEASE ORAL PRN
Status: DISCONTINUED | OUTPATIENT
Start: 2025-05-15 | End: 2025-05-19

## 2025-05-15 RX ORDER — SODIUM CHLORIDE 0.9 % (FLUSH) 0.9 %
5-40 SYRINGE (ML) INJECTION EVERY 12 HOURS SCHEDULED
Status: DISCONTINUED | OUTPATIENT
Start: 2025-05-15 | End: 2025-05-26 | Stop reason: HOSPADM

## 2025-05-15 RX ADMIN — ASPIRIN 81 MG CHEWABLE TABLET 81 MG: 81 TABLET CHEWABLE at 20:31

## 2025-05-15 RX ADMIN — SODIUM CHLORIDE, PRESERVATIVE FREE 10 ML: 5 INJECTION INTRAVENOUS at 20:32

## 2025-05-15 RX ADMIN — ATORVASTATIN CALCIUM 20 MG: 10 TABLET, FILM COATED ORAL at 20:31

## 2025-05-15 RX ADMIN — METOPROLOL TARTRATE 25 MG: 25 TABLET, FILM COATED ORAL at 20:31

## 2025-05-15 RX ADMIN — LABETALOL HYDROCHLORIDE 15 MG: 5 INJECTION, SOLUTION INTRAVENOUS at 16:58

## 2025-05-15 RX ADMIN — METOCLOPRAMIDE 10 MG: 10 TABLET ORAL at 20:32

## 2025-05-15 RX ADMIN — LABETALOL HYDROCHLORIDE 15 MG: 5 INJECTION, SOLUTION INTRAVENOUS at 12:46

## 2025-05-15 RX ADMIN — HYDRALAZINE HYDROCHLORIDE 100 MG: 50 TABLET ORAL at 20:31

## 2025-05-15 RX ADMIN — SODIUM CHLORIDE 75 ML/HR: 9 INJECTION, SOLUTION INTRAVENOUS at 12:55

## 2025-05-15 RX ADMIN — SODIUM CHLORIDE, PRESERVATIVE FREE 10 ML: 5 INJECTION INTRAVENOUS at 16:59

## 2025-05-15 RX ADMIN — CEFTRIAXONE 2000 MG: 2 INJECTION, POWDER, FOR SOLUTION INTRAMUSCULAR; INTRAVENOUS at 12:50

## 2025-05-15 RX ADMIN — CLONIDINE HYDROCHLORIDE 0.2 MG: 0.2 TABLET ORAL at 20:31

## 2025-05-15 ASSESSMENT — PAIN SCALES - GENERAL: PAINLEVEL_OUTOF10: 0

## 2025-05-15 NOTE — ED PROVIDER NOTES
[facility] EMERGENCY DEPARTMENT  EMERGENCY DEPARTMENT ENCOUNTER        Pt Name: Greg Easton  MRN: [id number]  Birthdate 1940  Date of evaluation: 5/15/2025  Provider: VIVIANA Aponte - CNP  PCP: Jacobo Zazueta MD  Note Started: 9:26 AM EDT 5/15/25       I have seen and evaluated this patient with my supervising physician No att. providers found.      CHIEF COMPLAINT       Chief Complaint   Patient presents with    Altered Mental Status     Pt brought in per EMS from home for confusion. Pt is a DNR-CC and his son lives with him at home and is his care giver. Son stated Pt has been more confused the last couple of days and has a hx of UTI's. Pt currently has a chronic pace catheter in place.        HISTORY OF PRESENT ILLNESS: 1 or more Elements     History From: EMS, nurse      Greg Easton is a 84 y.o. male with history of open wound, UTI, chronic Pace catheter who presents with mental status change.  Patient was sent to ED by EMS due to worsening confusion for 2 days.  Patient's son lives with patient.  Patient's CODE STATUS is DNR CC.    Nursing Notes were all reviewed and agreed with or any disagreements were addressed in the HPI.    REVIEW OF SYSTEMS :      Review of Systems   All other systems reviewed and are negative.      Positives and Pertinent negatives as per HPI.     SURGICAL HISTORY     Past Surgical History:   Procedure Laterality Date    BLADDER SURGERY      BLADDER SURGERY Left 03/17/2022    CYSTOSCOPY LEFT STENT EXCHANGE performed by Bal Mckeon MD at [facility] OR    CHANGE OF BLADDER TUBE,COMPLICATED  5/12/2024    COLON SURGERY      CYSTOSCOPY Left 09/07/2022    LEFT CYSTOSCOPY URETERAL STENT EXCHANGE AND SUPRABUPIC PACE CATHETER EXCHANGE performed by Mirella Wilkins MD at [facility] OR    CYSTOSCOPY Left 10/31/2023    CYSTOSCOPY, LEFT URETERAL STENT EXCHANGE, SUPRAPUBIC TUBE EXCHANGE performed by Mirella Wilkins MD at [facility] OR    CYSTOSCOPY Left 5/13/2024

## 2025-05-15 NOTE — ED NOTES
ED TO INPATIENT SBAR HANDOFF    Patient Name: Greg Easton   :  1940  84 y.o.   Preferred Name  Greg  Family/Caregiver Present yes- Pt's 2 sons are at the bedside with him  Restraints no   C-SSRS:    Sitter no   Sepsis Risk Score Sepsis V2 Risk Score: 45.2      Situation  Chief Complaint   Patient presents with    Altered Mental Status     Pt brought in per EMS from home for confusion. Pt is a DNR-CC and his son lives with him at home and is his care giver. Son stated Pt has been more confused the last couple of days and has a hx of UTI's. Pt currently has a chronic mccrary catheter in place.      Brief Description of Patient's Condition: Pt was brought in per EMS from home. He lives at home with one of his sons who is his caregiver. Pt is a DNR-CC. Pt's son stated he has been more confused the last couple of days and has a hx of UTI's. Pt has a chronic supra pubic catheter in place with yellow, cloudy, urine output with lots of sediment. Pt is drowsy/sleepy but is easily aroused and awaken to verbal stimuli. He is confused and is oriented to himself and both of his sons who at the bedside with him.             Mental Status: Pt is currently drowsy but is easily aroused and awaken to verbal stimuli. He is confused and is oriented to himself and his 2 sons who are currently at the bedside with him.  Arrived from: home    Imaging:   CT HEAD WO CONTRAST    (Results Pending)     Abnormal labs:   Abnormal Labs Reviewed   CBC WITH AUTO DIFFERENTIAL - Abnormal; Notable for the following components:       Result Value    RBC 2.90 (*)     Hemoglobin 8.9 (*)     Hematocrit 28.3 (*)     MCHC 31.4 (*)     RDW 15.4 (*)     Neutrophils % 69 (*)     Lymphocytes % 16 (*)     Monocytes % 9 (*)     Eosinophils % 5 (*)     All other components within normal limits   COMPREHENSIVE METABOLIC PANEL - Abnormal; Notable for the following components:    Potassium 5.3 (*)     CO2 19 (*)     Glucose 266 (*)     BUN 57 (*)     
Pt is going to be admitted to  # 3124. His sons were made aware of this and they both voiced understanding of this.  
Transport Tech is here to transport Pt up to [facility] Rm #3124. Pt transported up to Rm 3124 per Transport Tech per ER bed. Pt's condition is currently stable and he appears to be in no acute distress. His respirations are easy & unlabored on room air and his skin is warm and dry. He is drowsy/sleepy but is easily able to be aroused and awaken. He is confused but is oriented to himself and his sons. Called report on Pt to 3N RNTomasa to inform her that Pt received a one time dose of Labetalol IVP earlier and now his blood pressure is becoming elevated again and he currently does not have any other PRN BP meds ordered. This RN attempted to Perfect Serve Dr. Carter to in form him of this and that he has a supra pubic mccrary catheter that has not been able to be switched out here in the ED but was unable to Perfect Serve Dr. Carter here in the ED. This RN wanted to clarify with Dr. Carter if he would like the supra pubic catheter removed and a new one inserted into Pt or just the mccrary tubing and collection bag switched out and replaced with brand new. Informed Tomasa of all of this. Also informed Tomasa that Pt has an ongoing, chronic wound on his left heel that he has been seeing wound care for. No drainage or bleeding noted from the wound and he currently has a clean, dry intact dry dressing with Kerlex over the wound.Pt had all of his belongings with him when he was transported up to 3N.  
daily  Patient taking differently: Take 1 tablet by mouth nightly 4/22/22  Yes Valentino Lopez MD   acetaminophen (TYLENOL) 325 MG tablet Take 2 tablets by mouth every 4 hours as needed for Pain    ProviderCelso MD   miconazole (MICOTIN) 2 % powder Apply topically 2 times daily. 12/24/24   Lc De Paz MD   albuterol sulfate HFA (VENTOLIN HFA) 108 (90 Base) MCG/ACT inhaler Inhale 1 puff into the lungs 4 times daily Indications: shortness of breath    Provider, MD Celso     Medications added or changed (ex. new medication, dosage change, interval change, formulation change):  Metoprolol tartrate (added)  Clonidine (added)  Metoclopramide (added)    Medications removed from list (include reason, ex. noncompliance, medication cost, therapy complete etc.):   Melatonin not on med list per nursing facility.  Gabapentin not on med list per nursing facility.    Comments:  Unable to assess patient.  Patient with recent discharge from [facility] 05/05/25.  Prescriptions sent to retail pharmacy on 05/08/25 but has not been dispensed as of this date.  Family reports medications sent home from facility.  Family reports patient has not taken his medications for a couple days.    To my knowledge the above medication history is accurate as of 5/15/2025 10:37 AM.   Juana Menendez CPhT   5/15/2025 10:37 AM

## 2025-05-16 ENCOUNTER — ANESTHESIA EVENT (OUTPATIENT)
Dept: OPERATING ROOM | Age: 85
End: 2025-05-16
Payer: MEDICARE

## 2025-05-16 LAB
ALBUMIN SERPL-MCNC: 2.9 G/DL (ref 3.4–5)
ALBUMIN/GLOB SERPL: 0.8 {RATIO} (ref 1.1–2.2)
ALP SERPL-CCNC: 82 U/L (ref 40–129)
ALT SERPL-CCNC: 8 U/L (ref 10–40)
ANION GAP SERPL CALCULATED.3IONS-SCNC: 14 MMOL/L (ref 9–17)
AST SERPL-CCNC: 13 U/L (ref 15–37)
BASOPHILS # BLD: 0.06 K/UL
BASOPHILS NFR BLD: 1 % (ref 0–1)
BILIRUB DIRECT SERPL-MCNC: <0.2 MG/DL (ref 0–0.3)
BILIRUB INDIRECT SERPL-MCNC: ABNORMAL MG/DL (ref 0–0.7)
BILIRUB SERPL-MCNC: <0.2 MG/DL (ref 0–1)
BUN SERPL-MCNC: 62 MG/DL (ref 7–20)
CALCIUM SERPL-MCNC: 9.1 MG/DL (ref 8.3–10.6)
CHLORIDE SERPL-SCNC: 115 MMOL/L (ref 99–110)
CO2 SERPL-SCNC: 17 MMOL/L (ref 21–32)
CREAT SERPL-MCNC: 3.1 MG/DL (ref 0.8–1.3)
EOSINOPHIL # BLD: 0.51 K/UL
EOSINOPHILS RELATIVE PERCENT: 7 % (ref 0–3)
ERYTHROCYTE [DISTWIDTH] IN BLOOD BY AUTOMATED COUNT: 15.4 % (ref 11.7–14.9)
GFR, ESTIMATED: 19 ML/MIN/1.73M2
GLUCOSE SERPL-MCNC: 248 MG/DL (ref 74–99)
HCT VFR BLD AUTO: 26.4 % (ref 42–52)
HGB BLD-MCNC: 7.7 G/DL (ref 13.5–18)
IMM GRANULOCYTES # BLD AUTO: 0.02 K/UL
IMM GRANULOCYTES NFR BLD: 0 %
LYMPHOCYTES NFR BLD: 1.69 K/UL
LYMPHOCYTES RELATIVE PERCENT: 22 % (ref 24–44)
MAGNESIUM SERPL-MCNC: 2 MG/DL (ref 1.8–2.4)
MCH RBC QN AUTO: 30.1 PG (ref 27–31)
MCHC RBC AUTO-ENTMCNC: 29.2 G/DL (ref 32–36)
MCV RBC AUTO: 103.1 FL (ref 78–100)
MONOCYTES NFR BLD: 0.7 K/UL
MONOCYTES NFR BLD: 9 % (ref 0–5)
NEUTROPHILS NFR BLD: 61 % (ref 36–66)
NEUTS SEG NFR BLD: 4.74 K/UL
PHOSPHATE SERPL-MCNC: 3.9 MG/DL (ref 2.5–4.9)
PLATELET # BLD AUTO: 219 K/UL (ref 140–440)
PMV BLD AUTO: 11.1 FL (ref 7.5–11.1)
POTASSIUM SERPL-SCNC: 4.9 MMOL/L (ref 3.5–5.1)
PROT SERPL-MCNC: 6.5 G/DL (ref 6.4–8.2)
RBC # BLD AUTO: 2.56 M/UL (ref 4.6–6.2)
SODIUM SERPL-SCNC: 146 MMOL/L (ref 136–145)
WBC OTHER # BLD: 7.7 K/UL (ref 4–10.5)

## 2025-05-16 PROCEDURE — 6370000000 HC RX 637 (ALT 250 FOR IP): Performed by: INTERNAL MEDICINE

## 2025-05-16 PROCEDURE — 2500000003 HC RX 250 WO HCPCS: Performed by: INTERNAL MEDICINE

## 2025-05-16 PROCEDURE — 83735 ASSAY OF MAGNESIUM: CPT

## 2025-05-16 PROCEDURE — 6360000002 HC RX W HCPCS: Performed by: INTERNAL MEDICINE

## 2025-05-16 PROCEDURE — 84100 ASSAY OF PHOSPHORUS: CPT

## 2025-05-16 PROCEDURE — 2580000003 HC RX 258: Performed by: INTERNAL MEDICINE

## 2025-05-16 PROCEDURE — 2140000000 HC CCU INTERMEDIATE R&B

## 2025-05-16 PROCEDURE — 82248 BILIRUBIN DIRECT: CPT

## 2025-05-16 PROCEDURE — 80053 COMPREHEN METABOLIC PANEL: CPT

## 2025-05-16 PROCEDURE — 94761 N-INVAS EAR/PLS OXIMETRY MLT: CPT

## 2025-05-16 PROCEDURE — 36415 COLL VENOUS BLD VENIPUNCTURE: CPT

## 2025-05-16 PROCEDURE — 85025 COMPLETE CBC W/AUTO DIFF WBC: CPT

## 2025-05-16 RX ADMIN — SODIUM CHLORIDE, PRESERVATIVE FREE 10 ML: 5 INJECTION INTRAVENOUS at 08:48

## 2025-05-16 RX ADMIN — SODIUM CHLORIDE: 9 INJECTION, SOLUTION INTRAVENOUS at 02:43

## 2025-05-16 RX ADMIN — ASPIRIN 81 MG CHEWABLE TABLET 81 MG: 81 TABLET CHEWABLE at 20:39

## 2025-05-16 RX ADMIN — AMLODIPINE BESYLATE 10 MG: 10 TABLET ORAL at 08:48

## 2025-05-16 RX ADMIN — METOCLOPRAMIDE 10 MG: 10 TABLET ORAL at 14:40

## 2025-05-16 RX ADMIN — CLONIDINE HYDROCHLORIDE 0.2 MG: 0.2 TABLET ORAL at 20:39

## 2025-05-16 RX ADMIN — WATER 1000 MG: 1 INJECTION INTRAMUSCULAR; INTRAVENOUS; SUBCUTANEOUS at 08:48

## 2025-05-16 RX ADMIN — METOCLOPRAMIDE 10 MG: 10 TABLET ORAL at 08:48

## 2025-05-16 RX ADMIN — HYDRALAZINE HYDROCHLORIDE 100 MG: 50 TABLET ORAL at 20:39

## 2025-05-16 RX ADMIN — FERROUS SULFATE TAB 325 MG (65 MG ELEMENTAL FE) 325 MG: 325 (65 FE) TAB at 08:48

## 2025-05-16 RX ADMIN — LEVOTHYROXINE SODIUM 88 MCG: 0.09 TABLET ORAL at 06:27

## 2025-05-16 RX ADMIN — METOPROLOL TARTRATE 25 MG: 25 TABLET, FILM COATED ORAL at 08:48

## 2025-05-16 RX ADMIN — ENOXAPARIN SODIUM 30 MG: 100 INJECTION SUBCUTANEOUS at 08:48

## 2025-05-16 RX ADMIN — PANTOPRAZOLE SODIUM 40 MG: 40 TABLET, DELAYED RELEASE ORAL at 06:27

## 2025-05-16 RX ADMIN — CLONIDINE HYDROCHLORIDE 0.2 MG: 0.2 TABLET ORAL at 08:48

## 2025-05-16 RX ADMIN — ATORVASTATIN CALCIUM 20 MG: 10 TABLET, FILM COATED ORAL at 20:39

## 2025-05-16 RX ADMIN — SENNOSIDES 17.2 MG: 8.6 TABLET, FILM COATED ORAL at 08:48

## 2025-05-16 RX ADMIN — HYDRALAZINE HYDROCHLORIDE 100 MG: 50 TABLET ORAL at 14:40

## 2025-05-16 RX ADMIN — HYDRALAZINE HYDROCHLORIDE 100 MG: 50 TABLET ORAL at 06:27

## 2025-05-16 RX ADMIN — METOPROLOL TARTRATE 25 MG: 25 TABLET, FILM COATED ORAL at 20:39

## 2025-05-16 RX ADMIN — SODIUM CHLORIDE, PRESERVATIVE FREE 10 ML: 5 INJECTION INTRAVENOUS at 20:40

## 2025-05-16 ASSESSMENT — PAIN SCALES - GENERAL
PAINLEVEL_OUTOF10: 0
PAINLEVEL_OUTOF10: 0

## 2025-05-16 NOTE — CARE COORDINATION
05/16/25 1625   Service Assessment   Patient Orientation Other (see comment)  (sleeping)   Cognition Other (see comment)  (sleeping)   History Provided By Medical Record   Primary Caregiver Family   Support Systems Children   Patient's Healthcare Decision Maker is: Legal Next of Kin   PCP Verified by CM Yes   Prior Functional Level Assistance with the following:;Mobility   Current Functional Level Assistance with the following:;Mobility   Can patient return to prior living arrangement Unknown at present   Ability to make needs known: Other (see comment)   Family able to assist with home care needs: Yes   Would you like for me to discuss the discharge plan with any other family members/significant others, and if so, who? Yes   Financial Resources Medicare   Community Resources None     CM in to see Pt to initiate discharge planning.  Pt drousy and unable to fully participate in conversation.  Pt states he is from home.     CM attempted to call Pt's children, Jon, Axel, and Greg. All phone numbers go straight to voicemail.    Chart reviewed.  Pt was recently evicted from [facility] in April due to non payment. Per Shae/CASI, Pt's family did not assist in arranging payment or Medicaid.      PT/OT ordered, CM following for needs.

## 2025-05-17 LAB
25(OH)D3 SERPL-MCNC: 11.5 NG/ML (ref 30–150)
AMMONIA PLAS-SCNC: 18 UMOL/L (ref 16–60)
ANION GAP SERPL CALCULATED.3IONS-SCNC: 13 MMOL/L (ref 9–17)
BASOPHILS # BLD: 0.06 K/UL
BASOPHILS NFR BLD: 1 % (ref 0–1)
BUN SERPL-MCNC: 63 MG/DL (ref 7–20)
CALCIUM SERPL-MCNC: 8.9 MG/DL (ref 8.3–10.6)
CHLORIDE SERPL-SCNC: 112 MMOL/L (ref 99–110)
CO2 SERPL-SCNC: 17 MMOL/L (ref 21–32)
CREAT SERPL-MCNC: 3.1 MG/DL (ref 0.8–1.3)
EOSINOPHIL # BLD: 0.86 K/UL
EOSINOPHILS RELATIVE PERCENT: 11 % (ref 0–3)
ERYTHROCYTE [DISTWIDTH] IN BLOOD BY AUTOMATED COUNT: 15.4 % (ref 11.7–14.9)
FOLATE SERPL-MCNC: 18.8 NG/ML (ref 4.8–24.2)
GFR, ESTIMATED: 19 ML/MIN/1.73M2
GLUCOSE SERPL-MCNC: 325 MG/DL (ref 74–99)
HCT VFR BLD AUTO: 26.2 % (ref 42–52)
HGB BLD-MCNC: 7.6 G/DL (ref 13.5–18)
IMM GRANULOCYTES # BLD AUTO: 0.02 K/UL
IMM GRANULOCYTES NFR BLD: 0 %
LYMPHOCYTES NFR BLD: 1.29 K/UL
LYMPHOCYTES RELATIVE PERCENT: 16 % (ref 24–44)
MAGNESIUM SERPL-MCNC: 2.1 MG/DL (ref 1.8–2.4)
MCH RBC QN AUTO: 29.3 PG (ref 27–31)
MCHC RBC AUTO-ENTMCNC: 29 G/DL (ref 32–36)
MCV RBC AUTO: 101.2 FL (ref 78–100)
MICROORGANISM SPEC CULT: ABNORMAL
MICROORGANISM SPEC CULT: ABNORMAL
MONOCYTES NFR BLD: 0.52 K/UL
MONOCYTES NFR BLD: 7 % (ref 0–5)
NEUTROPHILS NFR BLD: 65 % (ref 36–66)
NEUTS SEG NFR BLD: 5.19 K/UL
PHOSPHATE SERPL-MCNC: 3.7 MG/DL (ref 2.5–4.9)
PLATELET # BLD AUTO: 223 K/UL (ref 140–440)
PMV BLD AUTO: 11.2 FL (ref 7.5–11.1)
POTASSIUM SERPL-SCNC: 4.6 MMOL/L (ref 3.5–5.1)
RBC # BLD AUTO: 2.59 M/UL (ref 4.6–6.2)
SODIUM SERPL-SCNC: 142 MMOL/L (ref 136–145)
SPECIMEN DESCRIPTION: ABNORMAL
TSH SERPL DL<=0.05 MIU/L-ACNC: 4.34 UIU/ML (ref 0.27–4.2)
VIT B12 SERPL-MCNC: 1165 PG/ML (ref 211–911)
WBC OTHER # BLD: 7.9 K/UL (ref 4–10.5)

## 2025-05-17 PROCEDURE — 97535 SELF CARE MNGMENT TRAINING: CPT

## 2025-05-17 PROCEDURE — 82306 VITAMIN D 25 HYDROXY: CPT

## 2025-05-17 PROCEDURE — 76937 US GUIDE VASCULAR ACCESS: CPT

## 2025-05-17 PROCEDURE — 6370000000 HC RX 637 (ALT 250 FOR IP): Performed by: INTERNAL MEDICINE

## 2025-05-17 PROCEDURE — 94761 N-INVAS EAR/PLS OXIMETRY MLT: CPT

## 2025-05-17 PROCEDURE — 2500000003 HC RX 250 WO HCPCS: Performed by: INTERNAL MEDICINE

## 2025-05-17 PROCEDURE — 84443 ASSAY THYROID STIM HORMONE: CPT

## 2025-05-17 PROCEDURE — 82607 VITAMIN B-12: CPT

## 2025-05-17 PROCEDURE — 6360000002 HC RX W HCPCS: Performed by: INTERNAL MEDICINE

## 2025-05-17 PROCEDURE — 2140000000 HC CCU INTERMEDIATE R&B

## 2025-05-17 PROCEDURE — 84100 ASSAY OF PHOSPHORUS: CPT

## 2025-05-17 PROCEDURE — 97166 OT EVAL MOD COMPLEX 45 MIN: CPT

## 2025-05-17 PROCEDURE — 82746 ASSAY OF FOLIC ACID SERUM: CPT

## 2025-05-17 PROCEDURE — 82140 ASSAY OF AMMONIA: CPT

## 2025-05-17 PROCEDURE — 97110 THERAPEUTIC EXERCISES: CPT

## 2025-05-17 PROCEDURE — 80048 BASIC METABOLIC PNL TOTAL CA: CPT

## 2025-05-17 PROCEDURE — 85025 COMPLETE CBC W/AUTO DIFF WBC: CPT

## 2025-05-17 PROCEDURE — 83735 ASSAY OF MAGNESIUM: CPT

## 2025-05-17 RX ORDER — INSULIN LISPRO 100 [IU]/ML
0-4 INJECTION, SOLUTION INTRAVENOUS; SUBCUTANEOUS
Status: DISCONTINUED | OUTPATIENT
Start: 2025-05-18 | End: 2025-05-18

## 2025-05-17 RX ORDER — DEXTROSE MONOHYDRATE 100 MG/ML
INJECTION, SOLUTION INTRAVENOUS CONTINUOUS PRN
Status: DISCONTINUED | OUTPATIENT
Start: 2025-05-17 | End: 2025-05-18 | Stop reason: SDUPTHER

## 2025-05-17 RX ORDER — INSULIN GLARGINE 100 [IU]/ML
6 INJECTION, SOLUTION SUBCUTANEOUS DAILY
Status: DISCONTINUED | OUTPATIENT
Start: 2025-05-18 | End: 2025-05-18

## 2025-05-17 RX ORDER — SODIUM CHLORIDE 9 MG/ML
INJECTION, SOLUTION INTRAVENOUS PRN
Status: CANCELLED | OUTPATIENT
Start: 2025-05-17

## 2025-05-17 RX ORDER — GLUCAGON 1 MG/ML
1 KIT INJECTION PRN
Status: DISCONTINUED | OUTPATIENT
Start: 2025-05-17 | End: 2025-05-18 | Stop reason: SDUPTHER

## 2025-05-17 RX ORDER — SODIUM CHLORIDE 0.9 % (FLUSH) 0.9 %
5-40 SYRINGE (ML) INJECTION EVERY 12 HOURS SCHEDULED
Status: CANCELLED | OUTPATIENT
Start: 2025-05-17

## 2025-05-17 RX ORDER — SODIUM CHLORIDE 0.9 % (FLUSH) 0.9 %
5-40 SYRINGE (ML) INJECTION PRN
Status: CANCELLED | OUTPATIENT
Start: 2025-05-17

## 2025-05-17 RX ORDER — INSULIN LISPRO 100 [IU]/ML
2 INJECTION, SOLUTION INTRAVENOUS; SUBCUTANEOUS
Status: DISCONTINUED | OUTPATIENT
Start: 2025-05-18 | End: 2025-05-18

## 2025-05-17 RX ADMIN — ENOXAPARIN SODIUM 30 MG: 100 INJECTION SUBCUTANEOUS at 08:34

## 2025-05-17 RX ADMIN — ACETAMINOPHEN 650 MG: 325 TABLET ORAL at 13:59

## 2025-05-17 RX ADMIN — ATORVASTATIN CALCIUM 20 MG: 10 TABLET, FILM COATED ORAL at 21:30

## 2025-05-17 RX ADMIN — METOPROLOL TARTRATE 25 MG: 25 TABLET, FILM COATED ORAL at 21:31

## 2025-05-17 RX ADMIN — SENNOSIDES 17.2 MG: 8.6 TABLET, FILM COATED ORAL at 08:34

## 2025-05-17 RX ADMIN — HYDRALAZINE HYDROCHLORIDE 100 MG: 50 TABLET ORAL at 13:59

## 2025-05-17 RX ADMIN — AMLODIPINE BESYLATE 10 MG: 10 TABLET ORAL at 08:34

## 2025-05-17 RX ADMIN — LEVOTHYROXINE SODIUM 88 MCG: 0.09 TABLET ORAL at 06:33

## 2025-05-17 RX ADMIN — FERROUS SULFATE TAB 325 MG (65 MG ELEMENTAL FE) 325 MG: 325 (65 FE) TAB at 08:34

## 2025-05-17 RX ADMIN — CLONIDINE HYDROCHLORIDE 0.2 MG: 0.2 TABLET ORAL at 21:31

## 2025-05-17 RX ADMIN — CLONIDINE HYDROCHLORIDE 0.2 MG: 0.2 TABLET ORAL at 08:35

## 2025-05-17 RX ADMIN — SODIUM CHLORIDE, PRESERVATIVE FREE 10 ML: 5 INJECTION INTRAVENOUS at 21:45

## 2025-05-17 RX ADMIN — ASPIRIN 81 MG CHEWABLE TABLET 81 MG: 81 TABLET CHEWABLE at 21:31

## 2025-05-17 RX ADMIN — SODIUM CHLORIDE, PRESERVATIVE FREE 10 ML: 5 INJECTION INTRAVENOUS at 08:35

## 2025-05-17 RX ADMIN — HYDRALAZINE HYDROCHLORIDE 100 MG: 50 TABLET ORAL at 06:33

## 2025-05-17 RX ADMIN — METOPROLOL TARTRATE 25 MG: 25 TABLET, FILM COATED ORAL at 08:34

## 2025-05-17 RX ADMIN — WATER 1000 MG: 1 INJECTION INTRAMUSCULAR; INTRAVENOUS; SUBCUTANEOUS at 11:44

## 2025-05-17 RX ADMIN — HYDRALAZINE HYDROCHLORIDE 100 MG: 50 TABLET ORAL at 21:31

## 2025-05-17 RX ADMIN — PANTOPRAZOLE SODIUM 40 MG: 40 TABLET, DELAYED RELEASE ORAL at 06:33

## 2025-05-17 ASSESSMENT — PAIN SCALES - GENERAL
PAINLEVEL_OUTOF10: 7
PAINLEVEL_OUTOF10: 0

## 2025-05-18 LAB
ANION GAP SERPL CALCULATED.3IONS-SCNC: 13 MMOL/L (ref 9–17)
BASOPHILS # BLD: 0.04 K/UL
BASOPHILS NFR BLD: 1 % (ref 0–1)
BUN SERPL-MCNC: 64 MG/DL (ref 7–20)
CALCIUM SERPL-MCNC: 8.9 MG/DL (ref 8.3–10.6)
CHLORIDE SERPL-SCNC: 114 MMOL/L (ref 99–110)
CO2 SERPL-SCNC: 16 MMOL/L (ref 21–32)
CREAT SERPL-MCNC: 3 MG/DL (ref 0.8–1.3)
EOSINOPHIL # BLD: 0.6 K/UL
EOSINOPHILS RELATIVE PERCENT: 12 % (ref 0–3)
ERYTHROCYTE [DISTWIDTH] IN BLOOD BY AUTOMATED COUNT: 15.4 % (ref 11.7–14.9)
EST. AVERAGE GLUCOSE BLD GHB EST-MCNC: 183 MG/DL
GFR, ESTIMATED: 20 ML/MIN/1.73M2
GLUCOSE BLD-MCNC: 122 MG/DL (ref 74–99)
GLUCOSE BLD-MCNC: 257 MG/DL (ref 74–99)
GLUCOSE BLD-MCNC: 358 MG/DL (ref 74–99)
GLUCOSE BLD-MCNC: 462 MG/DL (ref 74–99)
GLUCOSE BLD-MCNC: 60 MG/DL (ref 74–99)
GLUCOSE BLD-MCNC: 81 MG/DL (ref 74–99)
GLUCOSE SERPL-MCNC: 415 MG/DL (ref 74–99)
HBA1C MFR BLD: 8 % (ref 4.2–6.3)
HCT VFR BLD AUTO: 24.8 % (ref 42–52)
HGB BLD-MCNC: 7.1 G/DL (ref 13.5–18)
IMM GRANULOCYTES # BLD AUTO: 0.01 K/UL
IMM GRANULOCYTES NFR BLD: 0 %
LYMPHOCYTES NFR BLD: 1.08 K/UL
LYMPHOCYTES RELATIVE PERCENT: 21 % (ref 24–44)
MAGNESIUM SERPL-MCNC: 2.1 MG/DL (ref 1.8–2.4)
MCH RBC QN AUTO: 29.3 PG (ref 27–31)
MCHC RBC AUTO-ENTMCNC: 28.6 G/DL (ref 32–36)
MCV RBC AUTO: 102.5 FL (ref 78–100)
MONOCYTES NFR BLD: 0.35 K/UL
MONOCYTES NFR BLD: 7 % (ref 0–5)
NEUTROPHILS NFR BLD: 59 % (ref 36–66)
NEUTS SEG NFR BLD: 2.96 K/UL
PHOSPHATE SERPL-MCNC: 3.9 MG/DL (ref 2.5–4.9)
PLATELET # BLD AUTO: 200 K/UL (ref 140–440)
PMV BLD AUTO: 11 FL (ref 7.5–11.1)
POTASSIUM SERPL-SCNC: 4.8 MMOL/L (ref 3.5–5.1)
RBC # BLD AUTO: 2.42 M/UL (ref 4.6–6.2)
SODIUM SERPL-SCNC: 143 MMOL/L (ref 136–145)
WBC OTHER # BLD: 5 K/UL (ref 4–10.5)

## 2025-05-18 PROCEDURE — 2140000000 HC CCU INTERMEDIATE R&B

## 2025-05-18 PROCEDURE — 6370000000 HC RX 637 (ALT 250 FOR IP): Performed by: INTERNAL MEDICINE

## 2025-05-18 PROCEDURE — 80048 BASIC METABOLIC PNL TOTAL CA: CPT

## 2025-05-18 PROCEDURE — 6360000002 HC RX W HCPCS: Performed by: INTERNAL MEDICINE

## 2025-05-18 PROCEDURE — 83735 ASSAY OF MAGNESIUM: CPT

## 2025-05-18 PROCEDURE — 2500000003 HC RX 250 WO HCPCS: Performed by: INTERNAL MEDICINE

## 2025-05-18 PROCEDURE — 85025 COMPLETE CBC W/AUTO DIFF WBC: CPT

## 2025-05-18 PROCEDURE — 94761 N-INVAS EAR/PLS OXIMETRY MLT: CPT

## 2025-05-18 PROCEDURE — 83036 HEMOGLOBIN GLYCOSYLATED A1C: CPT

## 2025-05-18 PROCEDURE — 84100 ASSAY OF PHOSPHORUS: CPT

## 2025-05-18 PROCEDURE — 2580000003 HC RX 258: Performed by: INTERNAL MEDICINE

## 2025-05-18 PROCEDURE — 82962 GLUCOSE BLOOD TEST: CPT

## 2025-05-18 PROCEDURE — 36415 COLL VENOUS BLD VENIPUNCTURE: CPT

## 2025-05-18 RX ORDER — INSULIN LISPRO 100 [IU]/ML
5 INJECTION, SOLUTION INTRAVENOUS; SUBCUTANEOUS
Status: DISCONTINUED | OUTPATIENT
Start: 2025-05-18 | End: 2025-05-24

## 2025-05-18 RX ORDER — ERGOCALCIFEROL 1.25 MG/1
50000 CAPSULE, LIQUID FILLED ORAL WEEKLY
Status: DISCONTINUED | OUTPATIENT
Start: 2025-05-18 | End: 2025-05-26 | Stop reason: HOSPADM

## 2025-05-18 RX ORDER — GLUCAGON 1 MG/ML
1 KIT INJECTION PRN
Status: DISCONTINUED | OUTPATIENT
Start: 2025-05-18 | End: 2025-05-26 | Stop reason: HOSPADM

## 2025-05-18 RX ORDER — INSULIN LISPRO 100 [IU]/ML
5 INJECTION, SOLUTION INTRAVENOUS; SUBCUTANEOUS ONCE
Status: COMPLETED | OUTPATIENT
Start: 2025-05-18 | End: 2025-05-18

## 2025-05-18 RX ORDER — INSULIN LISPRO 100 [IU]/ML
0-8 INJECTION, SOLUTION INTRAVENOUS; SUBCUTANEOUS
Status: DISCONTINUED | OUTPATIENT
Start: 2025-05-18 | End: 2025-05-26 | Stop reason: HOSPADM

## 2025-05-18 RX ORDER — DEXTROSE MONOHYDRATE 100 MG/ML
INJECTION, SOLUTION INTRAVENOUS CONTINUOUS PRN
Status: DISCONTINUED | OUTPATIENT
Start: 2025-05-18 | End: 2025-05-26 | Stop reason: HOSPADM

## 2025-05-18 RX ORDER — INSULIN LISPRO 100 [IU]/ML
0-16 INJECTION, SOLUTION INTRAVENOUS; SUBCUTANEOUS
Status: DISCONTINUED | OUTPATIENT
Start: 2025-05-18 | End: 2025-05-18

## 2025-05-18 RX ORDER — INSULIN GLARGINE 100 [IU]/ML
15 INJECTION, SOLUTION SUBCUTANEOUS DAILY
Status: DISCONTINUED | OUTPATIENT
Start: 2025-05-19 | End: 2025-05-24

## 2025-05-18 RX ORDER — INSULIN LISPRO 100 [IU]/ML
0-16 INJECTION, SOLUTION INTRAVENOUS; SUBCUTANEOUS ONCE
Status: COMPLETED | OUTPATIENT
Start: 2025-05-18 | End: 2025-05-18

## 2025-05-18 RX ADMIN — HYDRALAZINE HYDROCHLORIDE 100 MG: 50 TABLET ORAL at 21:44

## 2025-05-18 RX ADMIN — SODIUM CHLORIDE, PRESERVATIVE FREE 10 ML: 5 INJECTION INTRAVENOUS at 09:33

## 2025-05-18 RX ADMIN — SODIUM CHLORIDE, PRESERVATIVE FREE 10 ML: 5 INJECTION INTRAVENOUS at 19:47

## 2025-05-18 RX ADMIN — CLONIDINE HYDROCHLORIDE 0.2 MG: 0.2 TABLET ORAL at 19:47

## 2025-05-18 RX ADMIN — ENOXAPARIN SODIUM 30 MG: 100 INJECTION SUBCUTANEOUS at 09:34

## 2025-05-18 RX ADMIN — PANTOPRAZOLE SODIUM 40 MG: 40 TABLET, DELAYED RELEASE ORAL at 06:04

## 2025-05-18 RX ADMIN — HYDRALAZINE HYDROCHLORIDE 100 MG: 50 TABLET ORAL at 06:04

## 2025-05-18 RX ADMIN — INSULIN GLARGINE 6 UNITS: 100 INJECTION, SOLUTION SUBCUTANEOUS at 09:32

## 2025-05-18 RX ADMIN — INSULIN LISPRO 5 UNITS: 100 INJECTION, SOLUTION INTRAVENOUS; SUBCUTANEOUS at 13:01

## 2025-05-18 RX ADMIN — ATORVASTATIN CALCIUM 20 MG: 10 TABLET, FILM COATED ORAL at 19:47

## 2025-05-18 RX ADMIN — INSULIN HUMAN 3 UNITS: 100 INJECTION, SUSPENSION SUBCUTANEOUS at 15:08

## 2025-05-18 RX ADMIN — INSULIN LISPRO 2 UNITS: 100 INJECTION, SOLUTION INTRAVENOUS; SUBCUTANEOUS at 09:29

## 2025-05-18 RX ADMIN — AMLODIPINE BESYLATE 10 MG: 10 TABLET ORAL at 09:33

## 2025-05-18 RX ADMIN — HYDRALAZINE HYDROCHLORIDE 100 MG: 50 TABLET ORAL at 14:30

## 2025-05-18 RX ADMIN — DEXTROSE MONOHYDRATE 125 ML: 100 INJECTION, SOLUTION INTRAVENOUS at 21:44

## 2025-05-18 RX ADMIN — METOPROLOL TARTRATE 25 MG: 25 TABLET, FILM COATED ORAL at 19:47

## 2025-05-18 RX ADMIN — MEROPENEM 500 MG: 500 INJECTION, POWDER, FOR SOLUTION INTRAVENOUS at 13:02

## 2025-05-18 RX ADMIN — CLONIDINE HYDROCHLORIDE 0.2 MG: 0.2 TABLET ORAL at 09:33

## 2025-05-18 RX ADMIN — FERROUS SULFATE TAB 325 MG (65 MG ELEMENTAL FE) 325 MG: 325 (65 FE) TAB at 09:33

## 2025-05-18 RX ADMIN — ASPIRIN 81 MG CHEWABLE TABLET 81 MG: 81 TABLET CHEWABLE at 19:47

## 2025-05-18 RX ADMIN — ACETAMINOPHEN 650 MG: 325 TABLET ORAL at 19:47

## 2025-05-18 RX ADMIN — MEROPENEM 500 MG: 500 INJECTION, POWDER, FOR SOLUTION INTRAVENOUS at 00:19

## 2025-05-18 RX ADMIN — ERGOCALCIFEROL 50000 UNITS: 1.25 CAPSULE ORAL at 09:33

## 2025-05-18 RX ADMIN — METOPROLOL TARTRATE 25 MG: 25 TABLET, FILM COATED ORAL at 09:33

## 2025-05-18 RX ADMIN — LEVOTHYROXINE SODIUM 88 MCG: 0.09 TABLET ORAL at 06:04

## 2025-05-18 RX ADMIN — SENNOSIDES 17.2 MG: 8.6 TABLET, FILM COATED ORAL at 09:33

## 2025-05-18 RX ADMIN — Medication 16 G: at 21:03

## 2025-05-18 RX ADMIN — INSULIN LISPRO 16 UNITS: 100 INJECTION, SOLUTION INTRAVENOUS; SUBCUTANEOUS at 13:00

## 2025-05-18 RX ADMIN — INSULIN LISPRO 4 UNITS: 100 INJECTION, SOLUTION INTRAVENOUS; SUBCUTANEOUS at 01:15

## 2025-05-18 ASSESSMENT — PAIN DESCRIPTION - LOCATION: LOCATION: PELVIS

## 2025-05-18 ASSESSMENT — PAIN SCALES - GENERAL
PAINLEVEL_OUTOF10: 2
PAINLEVEL_OUTOF10: 5

## 2025-05-18 ASSESSMENT — PAIN - FUNCTIONAL ASSESSMENT: PAIN_FUNCTIONAL_ASSESSMENT: ACTIVITIES ARE NOT PREVENTED

## 2025-05-18 ASSESSMENT — PAIN DESCRIPTION - ONSET: ONSET: ON-GOING

## 2025-05-18 ASSESSMENT — PAIN DESCRIPTION - FREQUENCY: FREQUENCY: CONTINUOUS

## 2025-05-18 ASSESSMENT — PAIN DESCRIPTION - PAIN TYPE: TYPE: ACUTE PAIN

## 2025-05-18 ASSESSMENT — PAIN DESCRIPTION - DESCRIPTORS: DESCRIPTORS: DISCOMFORT

## 2025-05-18 NOTE — CARE COORDINATION
This RN CM attempted to reach family members, straight to voicemail. Dr. Valentin also lvm for family yesterday. OT  has seen patient, PT pending.

## 2025-05-18 NOTE — CARE COORDINATION
Notified by nr staff that pt's family is present at bedside.    LSW met with pt and son, Home, to initiate dc planning.    Home reported pt has only been home for 1 wk from [facility].  Home reported pt resides with him and pt has been total care at home requiring a matheus lift for transfer.  Home confirmed pt has exhausted all his Medicare SNF benefits at this time.  Home informed this LSW that his brother, Axel, is pt's POA.  LSW reviewed staff concerns of attempting to contact family, but it goes to VM with no return call.  Home reported they both have spam blockers on their phones so it goes straight to .  Home denies receiving any VM from  staff or other medical personnel.  LSW requested if the spam blocker could temporarily be removed so staff can contact them to review POC.  Home reported Axel is a preacher, however, so it may be difficult to reach him today, however, he can still be reached.  LSW called Home and LVM requesting return call.    LSW advised per MD that pt will need 2 wks iv abx upon dc.  LSW also advised that OT is currently rec SNF.  LSW reviewed dc opts: SNF pvt pay and or HC with cont'd family support.    At this time dc plan is TBD.    Electronically signed by SUKHWINDER Penn on 5/18/2025 at 2:59 PM

## 2025-05-19 ENCOUNTER — ANESTHESIA (OUTPATIENT)
Dept: OPERATING ROOM | Age: 85
End: 2025-05-19
Payer: MEDICARE

## 2025-05-19 ENCOUNTER — APPOINTMENT (OUTPATIENT)
Dept: GENERAL RADIOLOGY | Age: 85
DRG: 659 | End: 2025-05-19
Payer: MEDICARE

## 2025-05-19 LAB
ANION GAP SERPL CALCULATED.3IONS-SCNC: 12 MMOL/L (ref 9–17)
BASOPHILS # BLD: 0.03 K/UL
BASOPHILS NFR BLD: 0 % (ref 0–1)
BUN SERPL-MCNC: 60 MG/DL (ref 7–20)
CALCIUM SERPL-MCNC: 8.8 MG/DL (ref 8.3–10.6)
CHLORIDE SERPL-SCNC: 110 MMOL/L (ref 99–110)
CO2 SERPL-SCNC: 16 MMOL/L (ref 21–32)
CREAT SERPL-MCNC: 2.8 MG/DL (ref 0.8–1.3)
EOSINOPHIL # BLD: 0.73 K/UL
EOSINOPHILS RELATIVE PERCENT: 10 % (ref 0–3)
ERYTHROCYTE [DISTWIDTH] IN BLOOD BY AUTOMATED COUNT: 15.4 % (ref 11.7–14.9)
GFR, ESTIMATED: 22 ML/MIN/1.73M2
GLUCOSE BLD-MCNC: 104 MG/DL (ref 74–99)
GLUCOSE BLD-MCNC: 108 MG/DL (ref 74–99)
GLUCOSE BLD-MCNC: 134 MG/DL (ref 74–99)
GLUCOSE BLD-MCNC: 142 MG/DL (ref 74–99)
GLUCOSE BLD-MCNC: 146 MG/DL (ref 74–99)
GLUCOSE BLD-MCNC: 49 MG/DL (ref 74–99)
GLUCOSE BLD-MCNC: 52 MG/DL (ref 74–99)
GLUCOSE BLD-MCNC: 72 MG/DL (ref 74–99)
GLUCOSE SERPL-MCNC: 86 MG/DL (ref 74–99)
HCT VFR BLD AUTO: 24.7 % (ref 42–52)
HGB BLD-MCNC: 7.2 G/DL (ref 13.5–18)
IMM GRANULOCYTES # BLD AUTO: 0.03 K/UL
IMM GRANULOCYTES NFR BLD: 0 %
LYMPHOCYTES NFR BLD: 1.69 K/UL
LYMPHOCYTES RELATIVE PERCENT: 24 % (ref 24–44)
MAGNESIUM SERPL-MCNC: 2.2 MG/DL (ref 1.8–2.4)
MCH RBC QN AUTO: 29.4 PG (ref 27–31)
MCHC RBC AUTO-ENTMCNC: 29.1 G/DL (ref 32–36)
MCV RBC AUTO: 100.8 FL (ref 78–100)
MONOCYTES NFR BLD: 0.62 K/UL
MONOCYTES NFR BLD: 9 % (ref 0–5)
NEUTROPHILS NFR BLD: 57 % (ref 36–66)
NEUTS SEG NFR BLD: 4.07 K/UL
PLATELET # BLD AUTO: 206 K/UL (ref 140–440)
PMV BLD AUTO: 11.1 FL (ref 7.5–11.1)
POTASSIUM SERPL-SCNC: 5.5 MMOL/L (ref 3.5–5.1)
RBC # BLD AUTO: 2.45 M/UL (ref 4.6–6.2)
SODIUM SERPL-SCNC: 138 MMOL/L (ref 136–145)
WBC OTHER # BLD: 7.2 K/UL (ref 4–10.5)

## 2025-05-19 PROCEDURE — 2140000000 HC CCU INTERMEDIATE R&B

## 2025-05-19 PROCEDURE — 2580000003 HC RX 258: Performed by: INTERNAL MEDICINE

## 2025-05-19 PROCEDURE — 83735 ASSAY OF MAGNESIUM: CPT

## 2025-05-19 PROCEDURE — 80048 BASIC METABOLIC PNL TOTAL CA: CPT

## 2025-05-19 PROCEDURE — 97162 PT EVAL MOD COMPLEX 30 MIN: CPT

## 2025-05-19 PROCEDURE — 6370000000 HC RX 637 (ALT 250 FOR IP): Performed by: INTERNAL MEDICINE

## 2025-05-19 PROCEDURE — 6360000002 HC RX W HCPCS: Performed by: INTERNAL MEDICINE

## 2025-05-19 PROCEDURE — 2500000003 HC RX 250 WO HCPCS: Performed by: SPECIALIST

## 2025-05-19 PROCEDURE — 36415 COLL VENOUS BLD VENIPUNCTURE: CPT

## 2025-05-19 PROCEDURE — 3700000001 HC ADD 15 MINUTES (ANESTHESIA): Performed by: SPECIALIST

## 2025-05-19 PROCEDURE — 7100000000 HC PACU RECOVERY - FIRST 15 MIN: Performed by: SPECIALIST

## 2025-05-19 PROCEDURE — C1758 CATHETER, URETERAL: HCPCS | Performed by: SPECIALIST

## 2025-05-19 PROCEDURE — 6360000002 HC RX W HCPCS: Performed by: NURSE ANESTHETIST, CERTIFIED REGISTERED

## 2025-05-19 PROCEDURE — 2709999900 HC NON-CHARGEABLE SUPPLY: Performed by: SPECIALIST

## 2025-05-19 PROCEDURE — 3600000013 HC SURGERY LEVEL 3 ADDTL 15MIN: Performed by: SPECIALIST

## 2025-05-19 PROCEDURE — C2617 STENT, NON-COR, TEM W/O DEL: HCPCS | Performed by: SPECIALIST

## 2025-05-19 PROCEDURE — 6370000000 HC RX 637 (ALT 250 FOR IP): Performed by: SPECIALIST

## 2025-05-19 PROCEDURE — 3700000000 HC ANESTHESIA ATTENDED CARE: Performed by: SPECIALIST

## 2025-05-19 PROCEDURE — 97530 THERAPEUTIC ACTIVITIES: CPT

## 2025-05-19 PROCEDURE — 82962 GLUCOSE BLOOD TEST: CPT

## 2025-05-19 PROCEDURE — 0TP98DZ REMOVAL OF INTRALUMINAL DEVICE FROM URETER, VIA NATURAL OR ARTIFICIAL OPENING ENDOSCOPIC: ICD-10-PCS | Performed by: SPECIALIST

## 2025-05-19 PROCEDURE — 2500000003 HC RX 250 WO HCPCS: Performed by: NURSE ANESTHETIST, CERTIFIED REGISTERED

## 2025-05-19 PROCEDURE — 2580000003 HC RX 258: Performed by: NURSE ANESTHETIST, CERTIFIED REGISTERED

## 2025-05-19 PROCEDURE — 0T778DZ DILATION OF LEFT URETER WITH INTRALUMINAL DEVICE, VIA NATURAL OR ARTIFICIAL OPENING ENDOSCOPIC: ICD-10-PCS | Performed by: SPECIALIST

## 2025-05-19 PROCEDURE — 2580000003 HC RX 258: Performed by: NURSE PRACTITIONER

## 2025-05-19 PROCEDURE — 94761 N-INVAS EAR/PLS OXIMETRY MLT: CPT

## 2025-05-19 PROCEDURE — 3600000003 HC SURGERY LEVEL 3 BASE: Performed by: SPECIALIST

## 2025-05-19 PROCEDURE — 2500000003 HC RX 250 WO HCPCS: Performed by: INTERNAL MEDICINE

## 2025-05-19 PROCEDURE — 76000 FLUOROSCOPY <1 HR PHYS/QHP: CPT

## 2025-05-19 PROCEDURE — 97112 NEUROMUSCULAR REEDUCATION: CPT

## 2025-05-19 PROCEDURE — C1769 GUIDE WIRE: HCPCS | Performed by: SPECIALIST

## 2025-05-19 PROCEDURE — 7100000001 HC PACU RECOVERY - ADDTL 15 MIN: Performed by: SPECIALIST

## 2025-05-19 PROCEDURE — 85025 COMPLETE CBC W/AUTO DIFF WBC: CPT

## 2025-05-19 DEVICE — VARIABLE LENGTH URETERAL STENT
Type: IMPLANTABLE DEVICE | Status: FUNCTIONAL
Brand: CONTOUR VL™

## 2025-05-19 RX ORDER — SODIUM CHLORIDE 0.9 % (FLUSH) 0.9 %
5-40 SYRINGE (ML) INJECTION PRN
Status: DISCONTINUED | OUTPATIENT
Start: 2025-05-19 | End: 2025-05-19 | Stop reason: HOSPADM

## 2025-05-19 RX ORDER — DEXTROSE, SODIUM CHLORIDE, SODIUM LACTATE, POTASSIUM CHLORIDE, AND CALCIUM CHLORIDE 5; .6; .31; .03; .02 G/100ML; G/100ML; G/100ML; G/100ML; G/100ML
INJECTION, SOLUTION INTRAVENOUS CONTINUOUS
Status: DISPENSED | OUTPATIENT
Start: 2025-05-19 | End: 2025-05-19

## 2025-05-19 RX ORDER — OXYCODONE HYDROCHLORIDE 5 MG/1
5 TABLET ORAL PRN
Status: COMPLETED | OUTPATIENT
Start: 2025-05-19 | End: 2025-05-20

## 2025-05-19 RX ORDER — PROCHLORPERAZINE EDISYLATE 5 MG/ML
5 INJECTION INTRAMUSCULAR; INTRAVENOUS
Status: DISCONTINUED | OUTPATIENT
Start: 2025-05-19 | End: 2025-05-19 | Stop reason: HOSPADM

## 2025-05-19 RX ORDER — SODIUM CHLORIDE 9 MG/ML
INJECTION, SOLUTION INTRAVENOUS
Status: DISCONTINUED | OUTPATIENT
Start: 2025-05-19 | End: 2025-05-19 | Stop reason: SDUPTHER

## 2025-05-19 RX ORDER — FENTANYL CITRATE 50 UG/ML
25 INJECTION, SOLUTION INTRAMUSCULAR; INTRAVENOUS EVERY 5 MIN PRN
Status: DISCONTINUED | OUTPATIENT
Start: 2025-05-19 | End: 2025-05-19 | Stop reason: HOSPADM

## 2025-05-19 RX ORDER — PROPOFOL 10 MG/ML
INJECTION, EMULSION INTRAVENOUS
Status: DISCONTINUED | OUTPATIENT
Start: 2025-05-19 | End: 2025-05-19 | Stop reason: SDUPTHER

## 2025-05-19 RX ORDER — NALOXONE HYDROCHLORIDE 0.4 MG/ML
INJECTION, SOLUTION INTRAMUSCULAR; INTRAVENOUS; SUBCUTANEOUS PRN
Status: DISCONTINUED | OUTPATIENT
Start: 2025-05-19 | End: 2025-05-19 | Stop reason: HOSPADM

## 2025-05-19 RX ORDER — SODIUM CHLORIDE 9 MG/ML
INJECTION, SOLUTION INTRAVENOUS PRN
Status: DISCONTINUED | OUTPATIENT
Start: 2025-05-19 | End: 2025-05-19 | Stop reason: HOSPADM

## 2025-05-19 RX ORDER — LIDOCAINE HYDROCHLORIDE 20 MG/ML
INJECTION, SOLUTION INTRAVENOUS
Status: DISCONTINUED | OUTPATIENT
Start: 2025-05-19 | End: 2025-05-19 | Stop reason: SDUPTHER

## 2025-05-19 RX ORDER — DEXAMETHASONE SODIUM PHOSPHATE 4 MG/ML
INJECTION, SOLUTION INTRA-ARTICULAR; INTRALESIONAL; INTRAMUSCULAR; INTRAVENOUS; SOFT TISSUE
Status: DISCONTINUED | OUTPATIENT
Start: 2025-05-19 | End: 2025-05-19 | Stop reason: SDUPTHER

## 2025-05-19 RX ORDER — OXYCODONE HYDROCHLORIDE 10 MG/1
10 TABLET ORAL PRN
Status: COMPLETED | OUTPATIENT
Start: 2025-05-19 | End: 2025-05-20

## 2025-05-19 RX ORDER — EPHEDRINE SULFATE 50 MG/ML
INJECTION INTRAVENOUS
Status: DISCONTINUED | OUTPATIENT
Start: 2025-05-19 | End: 2025-05-19 | Stop reason: SDUPTHER

## 2025-05-19 RX ORDER — ONDANSETRON 2 MG/ML
INJECTION INTRAMUSCULAR; INTRAVENOUS
Status: DISCONTINUED | OUTPATIENT
Start: 2025-05-19 | End: 2025-05-19 | Stop reason: SDUPTHER

## 2025-05-19 RX ORDER — ONDANSETRON 2 MG/ML
4 INJECTION INTRAMUSCULAR; INTRAVENOUS
Status: DISCONTINUED | OUTPATIENT
Start: 2025-05-19 | End: 2025-05-19 | Stop reason: HOSPADM

## 2025-05-19 RX ORDER — IPRATROPIUM BROMIDE AND ALBUTEROL SULFATE 2.5; .5 MG/3ML; MG/3ML
1 SOLUTION RESPIRATORY (INHALATION)
Status: DISCONTINUED | OUTPATIENT
Start: 2025-05-19 | End: 2025-05-19 | Stop reason: HOSPADM

## 2025-05-19 RX ORDER — SODIUM CHLORIDE 0.9 % (FLUSH) 0.9 %
5-40 SYRINGE (ML) INJECTION EVERY 12 HOURS SCHEDULED
Status: DISCONTINUED | OUTPATIENT
Start: 2025-05-19 | End: 2025-05-19 | Stop reason: HOSPADM

## 2025-05-19 RX ADMIN — SODIUM CHLORIDE, PRESERVATIVE FREE 10 ML: 5 INJECTION INTRAVENOUS at 08:43

## 2025-05-19 RX ADMIN — HYDRALAZINE HYDROCHLORIDE 100 MG: 50 TABLET ORAL at 22:00

## 2025-05-19 RX ADMIN — EPHEDRINE SULFATE 10 MG: 50 INJECTION INTRAVENOUS at 13:57

## 2025-05-19 RX ADMIN — DEXTROSE, SODIUM CHLORIDE, SODIUM LACTATE, POTASSIUM CHLORIDE, AND CALCIUM CHLORIDE: 5; .6; .31; .03; .02 INJECTION, SOLUTION INTRAVENOUS at 03:18

## 2025-05-19 RX ADMIN — MEROPENEM 500 MG: 500 INJECTION, POWDER, FOR SOLUTION INTRAVENOUS at 00:01

## 2025-05-19 RX ADMIN — SODIUM CHLORIDE: 0.9 INJECTION, SOLUTION INTRAVENOUS at 13:24

## 2025-05-19 RX ADMIN — SODIUM CHLORIDE, PRESERVATIVE FREE 10 ML: 5 INJECTION INTRAVENOUS at 21:00

## 2025-05-19 RX ADMIN — EPHEDRINE SULFATE 20 MG: 50 INJECTION INTRAVENOUS at 13:59

## 2025-05-19 RX ADMIN — METOPROLOL TARTRATE 25 MG: 25 TABLET, FILM COATED ORAL at 08:42

## 2025-05-19 RX ADMIN — ASPIRIN 81 MG CHEWABLE TABLET 81 MG: 81 TABLET CHEWABLE at 20:26

## 2025-05-19 RX ADMIN — CLONIDINE HYDROCHLORIDE 0.2 MG: 0.2 TABLET ORAL at 20:27

## 2025-05-19 RX ADMIN — SODIUM CHLORIDE: 9 INJECTION, SOLUTION INTRAVENOUS at 13:37

## 2025-05-19 RX ADMIN — METOPROLOL TARTRATE 25 MG: 25 TABLET, FILM COATED ORAL at 20:26

## 2025-05-19 RX ADMIN — PROPOFOL 60 MG: 10 INJECTION, EMULSION INTRAVENOUS at 13:45

## 2025-05-19 RX ADMIN — INSULIN GLARGINE 15 UNITS: 100 INJECTION, SOLUTION SUBCUTANEOUS at 08:44

## 2025-05-19 RX ADMIN — EPHEDRINE SULFATE 20 MG: 50 INJECTION INTRAVENOUS at 14:02

## 2025-05-19 RX ADMIN — MEROPENEM 500 MG: 500 INJECTION, POWDER, FOR SOLUTION INTRAVENOUS at 11:47

## 2025-05-19 RX ADMIN — ONDANSETRON 4 MG: 2 INJECTION INTRAMUSCULAR; INTRAVENOUS at 13:52

## 2025-05-19 RX ADMIN — CLONIDINE HYDROCHLORIDE 0.2 MG: 0.2 TABLET ORAL at 08:42

## 2025-05-19 RX ADMIN — AMLODIPINE BESYLATE 10 MG: 10 TABLET ORAL at 08:42

## 2025-05-19 RX ADMIN — SENNOSIDES 17.2 MG: 8.6 TABLET, FILM COATED ORAL at 08:42

## 2025-05-19 RX ADMIN — DEXAMETHASONE SODIUM PHOSPHATE 4 MG: 4 INJECTION, SOLUTION INTRAMUSCULAR; INTRAVENOUS at 13:52

## 2025-05-19 RX ADMIN — DEXTROSE MONOHYDRATE 125 ML: 100 INJECTION, SOLUTION INTRAVENOUS at 02:45

## 2025-05-19 RX ADMIN — LIDOCAINE HYDROCHLORIDE 50 MG: 20 INJECTION, SOLUTION INTRAVENOUS at 13:45

## 2025-05-19 RX ADMIN — HYDRALAZINE HYDROCHLORIDE 100 MG: 50 TABLET ORAL at 15:39

## 2025-05-19 RX ADMIN — ATORVASTATIN CALCIUM 20 MG: 10 TABLET, FILM COATED ORAL at 20:26

## 2025-05-19 RX ADMIN — INSULIN LISPRO 5 UNITS: 100 INJECTION, SOLUTION INTRAVENOUS; SUBCUTANEOUS at 18:08

## 2025-05-19 ASSESSMENT — PAIN SCALES - WONG BAKER
WONGBAKER_NUMERICALRESPONSE: NO HURT

## 2025-05-19 ASSESSMENT — PAIN - FUNCTIONAL ASSESSMENT
PAIN_FUNCTIONAL_ASSESSMENT: ACTIVITIES ARE NOT PREVENTED
PAIN_FUNCTIONAL_ASSESSMENT: 0-10

## 2025-05-19 ASSESSMENT — PAIN SCALES - GENERAL
PAINLEVEL_OUTOF10: 0
PAINLEVEL_OUTOF10: 0

## 2025-05-19 ASSESSMENT — PAIN DESCRIPTION - LOCATION: LOCATION: PELVIS

## 2025-05-19 ASSESSMENT — PAIN DESCRIPTION - DESCRIPTORS: DESCRIPTORS: DISCOMFORT

## 2025-05-19 ASSESSMENT — PAIN DESCRIPTION - FREQUENCY: FREQUENCY: INTERMITTENT

## 2025-05-19 ASSESSMENT — PAIN DESCRIPTION - ONSET: ONSET: ON-GOING

## 2025-05-19 ASSESSMENT — PAIN DESCRIPTION - PAIN TYPE: TYPE: ACUTE PAIN

## 2025-05-19 NOTE — BRIEF OP NOTE
Brief Postoperative Note      Patient: Greg Easton  YOB: 1940  MRN: [id number]    Date of Procedure: 5/19/2025    Pre-Op Diagnosis Codes:      * Other hydronephrosis [N13.39]    Post-Op Diagnosis: Same       Procedure(s):  LEFT CYSTOSCOPY URETERAL STENT EXCHANGE    Surgeon(s):  Mirella Wilkins MD    Assistant:  * No surgical staff found *    Anesthesia: General    Estimated Blood Loss (mL): Minimal    Complications: None    Specimens:   * No specimens in log *    Implants:  Implant Name Type Inv. Item Serial No.  Lot No. LRB No. Used Action   STENT URET 4.8FR [id number] PERCFLX HYDR+ SFT PGTL TAPR TIP - [id number]  STENT URET 4.8FR [id number] PERCFLX HYDR+ SFT PGTL TAPR TIP  [employer]- [id number] Left 1 Implanted         Drains:   Negative Pressure Wound Therapy Heel Left (Active)       Suprapubic Catheter (Active)   Site Assessment Pink 05/19/25 0600   Urine Color Yellow 05/19/25 0600   Urine Appearance Cloudy 05/19/25 0600   Urine Odor Malodorous 05/19/25 0600   Collection Container Standard 05/19/25 0600   Securement Method Securing device (Describe) 05/19/25 0600   Catheter Care  Chlorhexidine Wipes 05/19/25 0600   Catheter Best Practices  Drainage tube clipped to bed;Catheter secured to thigh;Tamper seal intact;Bag below bladder;Bag not on floor;Lack of dependent loop in tubing;Drainage bag less than half full 05/19/25 0600   Status Draining 05/19/25 0600   Manual Irrigation Volume Input (mL) 40 mL 05/17/25 1601   Output (mL) 450 mL 05/19/25 0600       Findings:  Infection Present At Time Of Surgery (PATOS) (choose all levels that have infection present):  No infection present  Other Findings: uneventful left stent change    Electronically signed by Mirella Wilkins MD on 5/19/2025 at 2:12 PM

## 2025-05-19 NOTE — ANESTHESIA POSTPROCEDURE EVALUATION
Department of Anesthesiology  Postprocedure Note    Patient: Greg Easton  MRN: [id number]  YOB: 1940  Date of evaluation: 5/19/2025    Procedure Summary       Date: 05/19/25 Room / Location: [address]    Anesthesia Start: 1337 Anesthesia Stop: 1426    Procedure: LEFT CYSTOSCOPY URETERAL STENT EXCHANGE (Left) Diagnosis:       Other hydronephrosis      (Other hydronephrosis [N13.39])    Surgeons: Mirella Wilkins MD Responsible Provider: Froylan Bridges MD    Anesthesia Type: general ASA Status: 3            Anesthesia Type: No value filed.    Jackeline Phase I: Jackeline Score: 10    Jackeline Phase II:      Anesthesia Post Evaluation    Patient location during evaluation: PACU  Patient participation: complete - patient participated  Level of consciousness: awake  Airway patency: patent  Nausea & Vomiting: no nausea  Cardiovascular status: blood pressure returned to baseline  Respiratory status: acceptable  Hydration status: euvolemic  Multimodal analgesia pain management approach  Pain management: adequate    No notable events documented.

## 2025-05-19 NOTE — OP NOTE
Operative Note      Patient: Greg Easton  YOB: 1940  MRN: [id number]    Date of Procedure: 5/19/2025    Pre-Op Diagnosis Codes:      * Other hydronephrosis [N13.39]    Post-Op Diagnosis: Same       Procedure(s):  LEFT CYSTOSCOPY URETERAL STENT EXCHANGE    Surgeon(s):  Mirella Wilkins MD    Assistant:   * No surgical staff found *    Anesthesia: General    Estimated Blood Loss (mL): Minimal    Complications: None    Specimens:   * No specimens in log *    Implants:  Implant Name Type Inv. Item Serial No.  Lot No. LRB No. Used Action   STENT URET 4.8FR [id number] PERCFLX HYDR+ SFT PGTL TAPR TIP - [id number]  STENT URET 4.8FR [id number] PERCFLX HYDR+ SFT PGTL TAPR TIP  [employer]- [id number] Left 1 Implanted         Drains:   Negative Pressure Wound Therapy Heel Left (Active)       Suprapubic Catheter (Active)   Site Assessment Pink 05/19/25 0600   Urine Color Yellow 05/19/25 0600   Urine Appearance Cloudy 05/19/25 0600   Urine Odor Malodorous 05/19/25 0600   Collection Container Standard 05/19/25 0600   Securement Method Securing device (Describe) 05/19/25 0600   Catheter Care  Chlorhexidine Wipes 05/19/25 0600   Catheter Best Practices  Drainage tube clipped to bed;Catheter secured to thigh;Tamper seal intact;Bag below bladder;Bag not on floor;Lack of dependent loop in tubing;Drainage bag less than half full 05/19/25 0600   Status Draining 05/19/25 0600   Manual Irrigation Volume Input (mL) 40 mL 05/17/25 1601   Output (mL) 450 mL 05/19/25 0600       Findings:  Infection Present At Time Of Surgery (PATOS) (choose all levels that have infection present):  No infection present  Other Findings: uneventful left stent exchange    Detailed Description of Procedure:   This is a 84-year-old male chronic indwelling left ureteral stent secondary hydronephrosis is here now for his next scheduled ureteral stent exchange has been hospitalized on IV antibiotics and he has been optimized

## 2025-05-20 LAB
ANION GAP SERPL CALCULATED.3IONS-SCNC: 12 MMOL/L (ref 9–17)
BASOPHILS # BLD: 0 K/UL
BASOPHILS NFR BLD: 0 % (ref 0–1)
BUN SERPL-MCNC: 54 MG/DL (ref 7–20)
CALCIUM SERPL-MCNC: 8.8 MG/DL (ref 8.3–10.6)
CHLORIDE SERPL-SCNC: 109 MMOL/L (ref 99–110)
CO2 SERPL-SCNC: 17 MMOL/L (ref 21–32)
CREAT SERPL-MCNC: 2.5 MG/DL (ref 0.8–1.3)
EOSINOPHIL # BLD: 0 K/UL
EOSINOPHILS RELATIVE PERCENT: 0 % (ref 0–3)
ERYTHROCYTE [DISTWIDTH] IN BLOOD BY AUTOMATED COUNT: 15.3 % (ref 11.7–14.9)
GFR, ESTIMATED: 24 ML/MIN/1.73M2
GLUCOSE BLD-MCNC: 124 MG/DL (ref 74–99)
GLUCOSE BLD-MCNC: 188 MG/DL (ref 74–99)
GLUCOSE BLD-MCNC: 215 MG/DL (ref 74–99)
GLUCOSE SERPL-MCNC: 214 MG/DL (ref 74–99)
HCT VFR BLD AUTO: 27.2 % (ref 42–52)
HGB BLD-MCNC: 8 G/DL (ref 13.5–18)
IMM GRANULOCYTES # BLD AUTO: 0.02 K/UL
IMM GRANULOCYTES NFR BLD: 0 %
LYMPHOCYTES NFR BLD: 0.56 K/UL
LYMPHOCYTES RELATIVE PERCENT: 10 % (ref 24–44)
MCH RBC QN AUTO: 29.1 PG (ref 27–31)
MCHC RBC AUTO-ENTMCNC: 29.4 G/DL (ref 32–36)
MCV RBC AUTO: 98.9 FL (ref 78–100)
MONOCYTES NFR BLD: 0.06 K/UL
MONOCYTES NFR BLD: 1 % (ref 0–5)
NEUTROPHILS NFR BLD: 89 % (ref 36–66)
NEUTS SEG NFR BLD: 4.95 K/UL
PLATELET # BLD AUTO: 220 K/UL (ref 140–440)
PMV BLD AUTO: 11.5 FL (ref 7.5–11.1)
POTASSIUM SERPL-SCNC: 5.2 MMOL/L (ref 3.5–5.1)
POTASSIUM SERPL-SCNC: 6 MMOL/L (ref 3.5–5.1)
RBC # BLD AUTO: 2.75 M/UL (ref 4.6–6.2)
SODIUM SERPL-SCNC: 138 MMOL/L (ref 136–145)
WBC OTHER # BLD: 5.6 K/UL (ref 4–10.5)

## 2025-05-20 PROCEDURE — 6370000000 HC RX 637 (ALT 250 FOR IP): Performed by: SPECIALIST

## 2025-05-20 PROCEDURE — 80048 BASIC METABOLIC PNL TOTAL CA: CPT

## 2025-05-20 PROCEDURE — 2500000003 HC RX 250 WO HCPCS: Performed by: SPECIALIST

## 2025-05-20 PROCEDURE — 6360000002 HC RX W HCPCS: Performed by: SPECIALIST

## 2025-05-20 PROCEDURE — 6370000000 HC RX 637 (ALT 250 FOR IP): Performed by: ANESTHESIOLOGY

## 2025-05-20 PROCEDURE — 84132 ASSAY OF SERUM POTASSIUM: CPT

## 2025-05-20 PROCEDURE — 1200000000 HC SEMI PRIVATE

## 2025-05-20 PROCEDURE — 82962 GLUCOSE BLOOD TEST: CPT

## 2025-05-20 PROCEDURE — 2580000003 HC RX 258: Performed by: SPECIALIST

## 2025-05-20 PROCEDURE — 6370000000 HC RX 637 (ALT 250 FOR IP): Performed by: INTERNAL MEDICINE

## 2025-05-20 PROCEDURE — 36415 COLL VENOUS BLD VENIPUNCTURE: CPT

## 2025-05-20 PROCEDURE — 94761 N-INVAS EAR/PLS OXIMETRY MLT: CPT

## 2025-05-20 PROCEDURE — 85025 COMPLETE CBC W/AUTO DIFF WBC: CPT

## 2025-05-20 RX ADMIN — CLONIDINE HYDROCHLORIDE 0.2 MG: 0.2 TABLET ORAL at 09:19

## 2025-05-20 RX ADMIN — INSULIN LISPRO 4 UNITS: 100 INJECTION, SOLUTION INTRAVENOUS; SUBCUTANEOUS at 09:19

## 2025-05-20 RX ADMIN — INSULIN LISPRO 5 UNITS: 100 INJECTION, SOLUTION INTRAVENOUS; SUBCUTANEOUS at 09:21

## 2025-05-20 RX ADMIN — INSULIN LISPRO 5 UNITS: 100 INJECTION, SOLUTION INTRAVENOUS; SUBCUTANEOUS at 13:13

## 2025-05-20 RX ADMIN — SODIUM CHLORIDE, PRESERVATIVE FREE 10 ML: 5 INJECTION INTRAVENOUS at 09:27

## 2025-05-20 RX ADMIN — HYDRALAZINE HYDROCHLORIDE 100 MG: 50 TABLET ORAL at 13:13

## 2025-05-20 RX ADMIN — INSULIN GLARGINE 15 UNITS: 100 INJECTION, SOLUTION SUBCUTANEOUS at 09:25

## 2025-05-20 RX ADMIN — HYDRALAZINE HYDROCHLORIDE 100 MG: 50 TABLET ORAL at 21:11

## 2025-05-20 RX ADMIN — ENOXAPARIN SODIUM 30 MG: 100 INJECTION SUBCUTANEOUS at 09:19

## 2025-05-20 RX ADMIN — AMLODIPINE BESYLATE 10 MG: 10 TABLET ORAL at 09:19

## 2025-05-20 RX ADMIN — FERROUS SULFATE TAB 325 MG (65 MG ELEMENTAL FE) 325 MG: 325 (65 FE) TAB at 09:19

## 2025-05-20 RX ADMIN — SODIUM ZIRCONIUM CYCLOSILICATE 10 G: 10 POWDER, FOR SUSPENSION ORAL at 21:12

## 2025-05-20 RX ADMIN — MEROPENEM 500 MG: 500 INJECTION, POWDER, FOR SOLUTION INTRAVENOUS at 13:08

## 2025-05-20 RX ADMIN — ATORVASTATIN CALCIUM 20 MG: 10 TABLET, FILM COATED ORAL at 21:11

## 2025-05-20 RX ADMIN — PANTOPRAZOLE SODIUM 40 MG: 40 TABLET, DELAYED RELEASE ORAL at 06:23

## 2025-05-20 RX ADMIN — INSULIN LISPRO 2 UNITS: 100 INJECTION, SOLUTION INTRAVENOUS; SUBCUTANEOUS at 21:10

## 2025-05-20 RX ADMIN — SENNOSIDES 17.2 MG: 8.6 TABLET, FILM COATED ORAL at 09:19

## 2025-05-20 RX ADMIN — METOPROLOL TARTRATE 25 MG: 25 TABLET, FILM COATED ORAL at 21:11

## 2025-05-20 RX ADMIN — SODIUM ZIRCONIUM CYCLOSILICATE 10 G: 10 POWDER, FOR SUSPENSION ORAL at 09:19

## 2025-05-20 RX ADMIN — METOPROLOL TARTRATE 25 MG: 25 TABLET, FILM COATED ORAL at 09:19

## 2025-05-20 RX ADMIN — CLONIDINE HYDROCHLORIDE 0.2 MG: 0.2 TABLET ORAL at 21:11

## 2025-05-20 RX ADMIN — INSULIN LISPRO 2 UNITS: 100 INJECTION, SOLUTION INTRAVENOUS; SUBCUTANEOUS at 13:16

## 2025-05-20 RX ADMIN — MEROPENEM 500 MG: 500 INJECTION, POWDER, FOR SOLUTION INTRAVENOUS at 00:00

## 2025-05-20 RX ADMIN — LEVOTHYROXINE SODIUM 88 MCG: 0.09 TABLET ORAL at 06:23

## 2025-05-20 RX ADMIN — HYDRALAZINE HYDROCHLORIDE 100 MG: 50 TABLET ORAL at 06:23

## 2025-05-20 RX ADMIN — SODIUM CHLORIDE, PRESERVATIVE FREE 10 ML: 5 INJECTION INTRAVENOUS at 21:05

## 2025-05-20 RX ADMIN — OXYCODONE HYDROCHLORIDE 5 MG: 5 TABLET ORAL at 21:11

## 2025-05-20 RX ADMIN — MEROPENEM 500 MG: 500 INJECTION, POWDER, FOR SOLUTION INTRAVENOUS at 23:56

## 2025-05-20 RX ADMIN — ASPIRIN 81 MG CHEWABLE TABLET 81 MG: 81 TABLET CHEWABLE at 21:11

## 2025-05-20 ASSESSMENT — PAIN DESCRIPTION - FREQUENCY
FREQUENCY: INTERMITTENT

## 2025-05-20 ASSESSMENT — PAIN DESCRIPTION - DESCRIPTORS
DESCRIPTORS: ACHING;DULL
DESCRIPTORS: ACHING;DULL
DESCRIPTORS: DISCOMFORT
DESCRIPTORS: ACHING;DISCOMFORT

## 2025-05-20 ASSESSMENT — PAIN DESCRIPTION - ONSET
ONSET: ON-GOING

## 2025-05-20 ASSESSMENT — PAIN SCALES - GENERAL
PAINLEVEL_OUTOF10: 0
PAINLEVEL_OUTOF10: 4
PAINLEVEL_OUTOF10: 6
PAINLEVEL_OUTOF10: 0
PAINLEVEL_OUTOF10: 3

## 2025-05-20 ASSESSMENT — PAIN DESCRIPTION - LOCATION
LOCATION: FOOT
LOCATION: FOOT
LOCATION: ABDOMEN

## 2025-05-20 ASSESSMENT — PAIN DESCRIPTION - ORIENTATION
ORIENTATION: LEFT
ORIENTATION: LEFT
ORIENTATION: RIGHT;LEFT;LOWER

## 2025-05-20 ASSESSMENT — PAIN SCALES - WONG BAKER
WONGBAKER_NUMERICALRESPONSE: NO HURT

## 2025-05-20 ASSESSMENT — PAIN DESCRIPTION - PAIN TYPE
TYPE: ACUTE PAIN
TYPE: CHRONIC PAIN
TYPE: ACUTE PAIN
TYPE: CHRONIC PAIN

## 2025-05-20 ASSESSMENT — PAIN - FUNCTIONAL ASSESSMENT
PAIN_FUNCTIONAL_ASSESSMENT: PREVENTS OR INTERFERES SOME ACTIVE ACTIVITIES AND ADLS
PAIN_FUNCTIONAL_ASSESSMENT: ACTIVITIES ARE NOT PREVENTED
PAIN_FUNCTIONAL_ASSESSMENT: PREVENTS OR INTERFERES SOME ACTIVE ACTIVITIES AND ADLS

## 2025-05-20 NOTE — CARE COORDINATION
CM call to Pt son Axel to follow up on discharge planning.  Plan is SNF vs home with family and home care.     Referral made to Loree[employer] at Axel's request for possible long term care Medicaid.     CM to meet with Axel this afternoon for discharge planning.     3:54 PM   CM met with Axel, Medicaid paperwork signed and will be provided to Loree tomorrow.      Discharge plan is SNF.  List of facilities provided to Axel.  Pt will be Medicaid pending and require a level of care prior to admission.      CM to follow up with family for SNF choice.

## 2025-05-21 LAB
ALBUMIN SERPL-MCNC: 2.9 G/DL (ref 3.4–5)
ALBUMIN/GLOB SERPL: 0.9 {RATIO} (ref 1.1–2.2)
ALP SERPL-CCNC: 93 U/L (ref 40–129)
ALT SERPL-CCNC: 21 U/L (ref 10–40)
ANION GAP SERPL CALCULATED.3IONS-SCNC: 11 MMOL/L (ref 9–17)
AST SERPL-CCNC: 35 U/L (ref 15–37)
BILIRUB SERPL-MCNC: <0.2 MG/DL (ref 0–1)
BUN SERPL-MCNC: 53 MG/DL (ref 7–20)
CALCIUM SERPL-MCNC: 8.4 MG/DL (ref 8.3–10.6)
CHLORIDE SERPL-SCNC: 109 MMOL/L (ref 99–110)
CO2 SERPL-SCNC: 17 MMOL/L (ref 21–32)
CREAT SERPL-MCNC: 2.6 MG/DL (ref 0.8–1.3)
GFR, ESTIMATED: 24 ML/MIN/1.73M2
GLUCOSE BLD-MCNC: 139 MG/DL (ref 74–99)
GLUCOSE BLD-MCNC: 148 MG/DL (ref 74–99)
GLUCOSE BLD-MCNC: 193 MG/DL (ref 74–99)
GLUCOSE BLD-MCNC: 276 MG/DL (ref 74–99)
GLUCOSE BLD-MCNC: 72 MG/DL (ref 74–99)
GLUCOSE BLD-MCNC: 73 MG/DL (ref 74–99)
GLUCOSE BLD-MCNC: 75 MG/DL (ref 74–99)
GLUCOSE SERPL-MCNC: 138 MG/DL (ref 74–99)
MAGNESIUM SERPL-MCNC: 2.1 MG/DL (ref 1.8–2.4)
MICROORGANISM SPEC CULT: NORMAL
MICROORGANISM SPEC CULT: NORMAL
PHOSPHATE SERPL-MCNC: 4 MG/DL (ref 2.5–4.9)
POTASSIUM SERPL-SCNC: 5.2 MMOL/L (ref 3.5–5.1)
PROT SERPL-MCNC: 6.2 G/DL (ref 6.4–8.2)
SERVICE CMNT-IMP: NORMAL
SERVICE CMNT-IMP: NORMAL
SODIUM SERPL-SCNC: 136 MMOL/L (ref 136–145)
SPECIMEN DESCRIPTION: NORMAL
SPECIMEN DESCRIPTION: NORMAL

## 2025-05-21 PROCEDURE — 80053 COMPREHEN METABOLIC PANEL: CPT

## 2025-05-21 PROCEDURE — 2500000003 HC RX 250 WO HCPCS: Performed by: SPECIALIST

## 2025-05-21 PROCEDURE — 84100 ASSAY OF PHOSPHORUS: CPT

## 2025-05-21 PROCEDURE — 2580000003 HC RX 258: Performed by: SPECIALIST

## 2025-05-21 PROCEDURE — 36415 COLL VENOUS BLD VENIPUNCTURE: CPT

## 2025-05-21 PROCEDURE — 97530 THERAPEUTIC ACTIVITIES: CPT

## 2025-05-21 PROCEDURE — 6370000000 HC RX 637 (ALT 250 FOR IP): Performed by: INTERNAL MEDICINE

## 2025-05-21 PROCEDURE — 83735 ASSAY OF MAGNESIUM: CPT

## 2025-05-21 PROCEDURE — 94761 N-INVAS EAR/PLS OXIMETRY MLT: CPT

## 2025-05-21 PROCEDURE — 6370000000 HC RX 637 (ALT 250 FOR IP): Performed by: SPECIALIST

## 2025-05-21 PROCEDURE — 6360000002 HC RX W HCPCS: Performed by: SPECIALIST

## 2025-05-21 PROCEDURE — 1200000000 HC SEMI PRIVATE

## 2025-05-21 PROCEDURE — 82962 GLUCOSE BLOOD TEST: CPT

## 2025-05-21 RX ORDER — SODIUM BICARBONATE 650 MG/1
650 TABLET ORAL 2 TIMES DAILY
Status: DISCONTINUED | OUTPATIENT
Start: 2025-05-21 | End: 2025-05-24

## 2025-05-21 RX ADMIN — HYDRALAZINE HYDROCHLORIDE 100 MG: 50 TABLET ORAL at 13:52

## 2025-05-21 RX ADMIN — INSULIN LISPRO 2 UNITS: 100 INJECTION, SOLUTION INTRAVENOUS; SUBCUTANEOUS at 11:12

## 2025-05-21 RX ADMIN — MEROPENEM 500 MG: 500 INJECTION, POWDER, FOR SOLUTION INTRAVENOUS at 12:06

## 2025-05-21 RX ADMIN — ASPIRIN 81 MG CHEWABLE TABLET 81 MG: 81 TABLET CHEWABLE at 20:52

## 2025-05-21 RX ADMIN — ATORVASTATIN CALCIUM 20 MG: 10 TABLET, FILM COATED ORAL at 20:52

## 2025-05-21 RX ADMIN — CLONIDINE HYDROCHLORIDE 0.2 MG: 0.2 TABLET ORAL at 20:52

## 2025-05-21 RX ADMIN — AMLODIPINE BESYLATE 10 MG: 10 TABLET ORAL at 10:31

## 2025-05-21 RX ADMIN — FERROUS SULFATE TAB 325 MG (65 MG ELEMENTAL FE) 325 MG: 325 (65 FE) TAB at 10:31

## 2025-05-21 RX ADMIN — PANTOPRAZOLE SODIUM 40 MG: 40 TABLET, DELAYED RELEASE ORAL at 05:13

## 2025-05-21 RX ADMIN — INSULIN LISPRO 5 UNITS: 100 INJECTION, SOLUTION INTRAVENOUS; SUBCUTANEOUS at 12:00

## 2025-05-21 RX ADMIN — HYDRALAZINE HYDROCHLORIDE 100 MG: 50 TABLET ORAL at 22:43

## 2025-05-21 RX ADMIN — SODIUM ZIRCONIUM CYCLOSILICATE 10 G: 10 POWDER, FOR SUSPENSION ORAL at 10:32

## 2025-05-21 RX ADMIN — SODIUM BICARBONATE 650 MG: 650 TABLET ORAL at 20:52

## 2025-05-21 RX ADMIN — METOPROLOL TARTRATE 25 MG: 25 TABLET, FILM COATED ORAL at 10:31

## 2025-05-21 RX ADMIN — HYDRALAZINE HYDROCHLORIDE 100 MG: 50 TABLET ORAL at 05:13

## 2025-05-21 RX ADMIN — SODIUM ZIRCONIUM CYCLOSILICATE 10 G: 10 POWDER, FOR SUSPENSION ORAL at 20:52

## 2025-05-21 RX ADMIN — INSULIN GLARGINE 15 UNITS: 100 INJECTION, SOLUTION SUBCUTANEOUS at 10:30

## 2025-05-21 RX ADMIN — ENOXAPARIN SODIUM 30 MG: 100 INJECTION SUBCUTANEOUS at 10:30

## 2025-05-21 RX ADMIN — SENNOSIDES 17.2 MG: 8.6 TABLET, FILM COATED ORAL at 10:31

## 2025-05-21 RX ADMIN — CLONIDINE HYDROCHLORIDE 0.2 MG: 0.2 TABLET ORAL at 10:31

## 2025-05-21 RX ADMIN — LEVOTHYROXINE SODIUM 88 MCG: 0.09 TABLET ORAL at 05:13

## 2025-05-21 RX ADMIN — SODIUM CHLORIDE, PRESERVATIVE FREE 10 ML: 5 INJECTION INTRAVENOUS at 10:30

## 2025-05-21 RX ADMIN — INSULIN LISPRO 2 UNITS: 100 INJECTION, SOLUTION INTRAVENOUS; SUBCUTANEOUS at 12:01

## 2025-05-21 RX ADMIN — SODIUM BICARBONATE 650 MG: 650 TABLET ORAL at 10:31

## 2025-05-21 RX ADMIN — SODIUM CHLORIDE, PRESERVATIVE FREE 10 ML: 5 INJECTION INTRAVENOUS at 20:51

## 2025-05-21 RX ADMIN — METOPROLOL TARTRATE 25 MG: 25 TABLET, FILM COATED ORAL at 20:52

## 2025-05-21 RX ADMIN — INSULIN LISPRO 5 UNITS: 100 INJECTION, SOLUTION INTRAVENOUS; SUBCUTANEOUS at 11:12

## 2025-05-21 NOTE — DISCHARGE INSTR - COC
Test): {Done Not Done Date:}    Treatments at the Time of Hospital Discharge:   Respiratory Treatments: ***  Oxygen Therapy:  {Therapy; copd oxygen:42520}  Ventilator:    {[facility] Vent List:757077517}    Rehab Therapies: {THERAPEUTIC INTERVENTION:2541127988}  Weight Bearing Status/Restrictions: {[facility] Weight Bearin}  Other Medical Equipment (for information only, NOT a DME order):  {EQUIPMENT:796212869}  Other Treatments: ***    Recommended Labs or Other Treatments After Discharge: ***    Physician Certification: I certify the above information and transfer of Greg Easton  is necessary for the continuing treatment of the diagnosis listed and that he requires {Admit to Appropriate Level of Care:70050} for {GREATER/LESS:321160277} 30 days.     Update Admission H&P: {CHP DME Changes in HandP:829949413}    PHYSICIAN SIGNATURE:  {Esignature:987754789}

## 2025-05-21 NOTE — CARE COORDINATION
Reviewed medical record at this time. Called pt's son Axel. Discussed facilities that can take Medicaid pending: [facility] [place], [place], [facility], [facility], [facility]. Son does NOT want [facility] or [facility] (pt's wife passed away there). Axel is going to discuss options with his brothers and will call CM later today.     1343: Met with pt's son Axel at bedside and he spoke to his siblings and they would like to keep pt local to [place]. They would like referral to [facility]. Per Loree Noyola with public benefits picked up the medicaid paperwork from Dennys's desk today. Pt's son Axel says he is only pt's HCPOA and not his legal power of  so he is in the process of trying to get pt's financial information from pt but pt is confused so it may be difficult. He thinks it should not be a problem getting pt's bank account information but isn't sure how much pt's house is worth. He will continue working on this and is aware that the information is very important to get a hold of. Told Axel that Radha would be the CM tomorrow and she would be available should he having any issues and CM will keep in touch.     Referral information sent through [facility] to [facility].

## 2025-05-22 LAB
ANION GAP SERPL CALCULATED.3IONS-SCNC: 11 MMOL/L (ref 9–17)
BILIRUB UR QL STRIP: NEGATIVE
BUN SERPL-MCNC: 54 MG/DL (ref 7–20)
CALCIUM SERPL-MCNC: 8.4 MG/DL (ref 8.3–10.6)
CHLORIDE SERPL-SCNC: 111 MMOL/L (ref 99–110)
CLARITY UR: CLEAR
CO2 SERPL-SCNC: 18 MMOL/L (ref 21–32)
COLOR UR: YELLOW
CREAT SERPL-MCNC: 2.8 MG/DL (ref 0.8–1.3)
CREAT UR-MCNC: 66.9 MG/DL (ref 39–259)
GFR, ESTIMATED: 22 ML/MIN/1.73M2
GLUCOSE BLD-MCNC: 118 MG/DL (ref 74–99)
GLUCOSE BLD-MCNC: 122 MG/DL (ref 74–99)
GLUCOSE BLD-MCNC: 160 MG/DL (ref 74–99)
GLUCOSE BLD-MCNC: 59 MG/DL (ref 74–99)
GLUCOSE BLD-MCNC: 92 MG/DL (ref 74–99)
GLUCOSE SERPL-MCNC: 81 MG/DL (ref 74–99)
GLUCOSE UR STRIP-MCNC: NEGATIVE MG/DL
HGB UR QL STRIP.AUTO: ABNORMAL
KETONES UR STRIP-MCNC: NEGATIVE MG/DL
LEUKOCYTE ESTERASE UR QL STRIP: ABNORMAL
MUCOUS THREADS URNS QL MICRO: ABNORMAL
NITRITE UR QL STRIP: NEGATIVE
PH UR STRIP: 5.5 [PH] (ref 5–8)
POTASSIUM SERPL-SCNC: 5.2 MMOL/L (ref 3.5–5.1)
PROT UR STRIP-MCNC: 100 MG/DL
RBC #/AREA URNS HPF: 148 /HPF (ref 0–2)
SODIUM SERPL-SCNC: 140 MMOL/L (ref 136–145)
SODIUM UR-SCNC: 66 MMOL/L (ref 40–220)
SP GR UR STRIP: 1.02 (ref 1–1.03)
TOTAL PROTEIN, URINE: 85 MG/DL
URINE TOTAL PROTEIN CREATININE RATIO: 1.27 (ref 0–0.2)
UROBILINOGEN UR STRIP-ACNC: 0.2 EU/DL (ref 0–1)
WBC #/AREA URNS HPF: 182 /HPF (ref 0–5)
YEAST URNS QL MICRO: ABNORMAL

## 2025-05-22 PROCEDURE — 6370000000 HC RX 637 (ALT 250 FOR IP): Performed by: SPECIALIST

## 2025-05-22 PROCEDURE — 82570 ASSAY OF URINE CREATININE: CPT

## 2025-05-22 PROCEDURE — 2500000003 HC RX 250 WO HCPCS: Performed by: SPECIALIST

## 2025-05-22 PROCEDURE — 97112 NEUROMUSCULAR REEDUCATION: CPT

## 2025-05-22 PROCEDURE — 1200000000 HC SEMI PRIVATE

## 2025-05-22 PROCEDURE — 97530 THERAPEUTIC ACTIVITIES: CPT

## 2025-05-22 PROCEDURE — 36415 COLL VENOUS BLD VENIPUNCTURE: CPT

## 2025-05-22 PROCEDURE — 2580000003 HC RX 258: Performed by: SPECIALIST

## 2025-05-22 PROCEDURE — 82962 GLUCOSE BLOOD TEST: CPT

## 2025-05-22 PROCEDURE — 6360000002 HC RX W HCPCS: Performed by: SPECIALIST

## 2025-05-22 PROCEDURE — 6370000000 HC RX 637 (ALT 250 FOR IP): Performed by: INTERNAL MEDICINE

## 2025-05-22 PROCEDURE — 84156 ASSAY OF PROTEIN URINE: CPT

## 2025-05-22 PROCEDURE — 84300 ASSAY OF URINE SODIUM: CPT

## 2025-05-22 PROCEDURE — 97535 SELF CARE MNGMENT TRAINING: CPT

## 2025-05-22 PROCEDURE — 81001 URINALYSIS AUTO W/SCOPE: CPT

## 2025-05-22 PROCEDURE — 80048 BASIC METABOLIC PNL TOTAL CA: CPT

## 2025-05-22 PROCEDURE — 94761 N-INVAS EAR/PLS OXIMETRY MLT: CPT

## 2025-05-22 RX ADMIN — FERROUS SULFATE TAB 325 MG (65 MG ELEMENTAL FE) 325 MG: 325 (65 FE) TAB at 09:21

## 2025-05-22 RX ADMIN — AMLODIPINE BESYLATE 10 MG: 10 TABLET ORAL at 09:19

## 2025-05-22 RX ADMIN — SODIUM BICARBONATE 650 MG: 650 TABLET ORAL at 09:22

## 2025-05-22 RX ADMIN — METOPROLOL TARTRATE 25 MG: 25 TABLET, FILM COATED ORAL at 21:54

## 2025-05-22 RX ADMIN — MEROPENEM 500 MG: 500 INJECTION, POWDER, FOR SOLUTION INTRAVENOUS at 12:16

## 2025-05-22 RX ADMIN — HYDRALAZINE HYDROCHLORIDE 100 MG: 50 TABLET ORAL at 21:53

## 2025-05-22 RX ADMIN — SODIUM ZIRCONIUM CYCLOSILICATE 10 G: 10 POWDER, FOR SUSPENSION ORAL at 21:53

## 2025-05-22 RX ADMIN — ATORVASTATIN CALCIUM 20 MG: 10 TABLET, FILM COATED ORAL at 21:54

## 2025-05-22 RX ADMIN — SODIUM CHLORIDE, PRESERVATIVE FREE 10 ML: 5 INJECTION INTRAVENOUS at 09:23

## 2025-05-22 RX ADMIN — SODIUM CHLORIDE, PRESERVATIVE FREE 10 ML: 5 INJECTION INTRAVENOUS at 21:55

## 2025-05-22 RX ADMIN — INSULIN GLARGINE 15 UNITS: 100 INJECTION, SOLUTION SUBCUTANEOUS at 09:19

## 2025-05-22 RX ADMIN — PANTOPRAZOLE SODIUM 40 MG: 40 TABLET, DELAYED RELEASE ORAL at 06:03

## 2025-05-22 RX ADMIN — ASPIRIN 81 MG CHEWABLE TABLET 81 MG: 81 TABLET CHEWABLE at 21:53

## 2025-05-22 RX ADMIN — ENOXAPARIN SODIUM 30 MG: 100 INJECTION SUBCUTANEOUS at 09:21

## 2025-05-22 RX ADMIN — LEVOTHYROXINE SODIUM 88 MCG: 0.09 TABLET ORAL at 06:03

## 2025-05-22 RX ADMIN — SODIUM BICARBONATE 650 MG: 650 TABLET ORAL at 21:54

## 2025-05-22 RX ADMIN — CLONIDINE HYDROCHLORIDE 0.2 MG: 0.2 TABLET ORAL at 09:19

## 2025-05-22 RX ADMIN — ACETAMINOPHEN 650 MG: 325 TABLET ORAL at 06:50

## 2025-05-22 RX ADMIN — SENNOSIDES 17.2 MG: 8.6 TABLET, FILM COATED ORAL at 09:18

## 2025-05-22 RX ADMIN — CLONIDINE HYDROCHLORIDE 0.2 MG: 0.2 TABLET ORAL at 21:54

## 2025-05-22 RX ADMIN — METOPROLOL TARTRATE 25 MG: 25 TABLET, FILM COATED ORAL at 09:19

## 2025-05-22 RX ADMIN — HYDRALAZINE HYDROCHLORIDE 100 MG: 50 TABLET ORAL at 06:03

## 2025-05-22 RX ADMIN — HYDRALAZINE HYDROCHLORIDE 100 MG: 50 TABLET ORAL at 14:34

## 2025-05-22 RX ADMIN — SODIUM ZIRCONIUM CYCLOSILICATE 10 G: 10 POWDER, FOR SUSPENSION ORAL at 09:19

## 2025-05-22 RX ADMIN — MEROPENEM 500 MG: 500 INJECTION, POWDER, FOR SOLUTION INTRAVENOUS at 00:07

## 2025-05-22 ASSESSMENT — PAIN DESCRIPTION - DESCRIPTORS: DESCRIPTORS: ACHING;BURNING;DISCOMFORT

## 2025-05-22 ASSESSMENT — PAIN - FUNCTIONAL ASSESSMENT: PAIN_FUNCTIONAL_ASSESSMENT: ACTIVITIES ARE NOT PREVENTED

## 2025-05-22 ASSESSMENT — PAIN SCALES - WONG BAKER
WONGBAKER_NUMERICALRESPONSE: NO HURT

## 2025-05-22 ASSESSMENT — PAIN DESCRIPTION - LOCATION: LOCATION: GENERALIZED;FOOT

## 2025-05-22 ASSESSMENT — PAIN DESCRIPTION - ORIENTATION: ORIENTATION: RIGHT;LEFT

## 2025-05-22 NOTE — CARE COORDINATION
Received call from Joe Wahl [facility], stating that he had a voicemail from patients jennifer Reyna stating that [facility]. CM went to patient room, no family in room at this time. Call to patients jennifer Reyna who informed this CM that he is discussing other options with his brothers for a different SNF and he would return this CM call with update on SNF choice.     11:44 AM Received update from jennifer Reyna that he would like referral to [facility]. Referral sent to [facility] via [facility]. CM updated Joe Wahl [facility], at this time.    1:30 PM Received update from [facility] via [facility] that unable to accept patient due to payment source. CM also received voicemail from Axel that he would like referral to [facility]. Referral sent to [facility] via [facility].     2:15 PM Call to Anthony Betancourt [facility], to ensure that she received information.

## 2025-05-23 LAB
ALBUMIN SERPL-MCNC: 2.9 G/DL (ref 3.4–5)
ALBUMIN/GLOB SERPL: 0.9 {RATIO} (ref 1.1–2.2)
ALP SERPL-CCNC: 91 U/L (ref 40–129)
ALT SERPL-CCNC: 18 U/L (ref 10–40)
ANION GAP SERPL CALCULATED.3IONS-SCNC: 12 MMOL/L (ref 9–17)
AST SERPL-CCNC: 25 U/L (ref 15–37)
BILIRUB SERPL-MCNC: <0.2 MG/DL (ref 0–1)
BUN SERPL-MCNC: 53 MG/DL (ref 7–20)
CALCIUM SERPL-MCNC: 8.5 MG/DL (ref 8.3–10.6)
CHLORIDE SERPL-SCNC: 109 MMOL/L (ref 99–110)
CO2 SERPL-SCNC: 17 MMOL/L (ref 21–32)
CREAT SERPL-MCNC: 2.6 MG/DL (ref 0.8–1.3)
GFR, ESTIMATED: 23 ML/MIN/1.73M2
GLUCOSE BLD-MCNC: 104 MG/DL (ref 74–99)
GLUCOSE BLD-MCNC: 105 MG/DL (ref 74–99)
GLUCOSE BLD-MCNC: 64 MG/DL (ref 74–99)
GLUCOSE BLD-MCNC: 67 MG/DL (ref 74–99)
GLUCOSE BLD-MCNC: 78 MG/DL (ref 74–99)
GLUCOSE BLD-MCNC: 78 MG/DL (ref 74–99)
GLUCOSE SERPL-MCNC: 176 MG/DL (ref 74–99)
MAGNESIUM SERPL-MCNC: 2.2 MG/DL (ref 1.8–2.4)
PHOSPHATE SERPL-MCNC: 4.5 MG/DL (ref 2.5–4.9)
POTASSIUM SERPL-SCNC: 5 MMOL/L (ref 3.5–5.1)
PROT SERPL-MCNC: 6.2 G/DL (ref 6.4–8.2)
SODIUM SERPL-SCNC: 137 MMOL/L (ref 136–145)

## 2025-05-23 PROCEDURE — 2580000003 HC RX 258: Performed by: SPECIALIST

## 2025-05-23 PROCEDURE — 6370000000 HC RX 637 (ALT 250 FOR IP): Performed by: SPECIALIST

## 2025-05-23 PROCEDURE — 83735 ASSAY OF MAGNESIUM: CPT

## 2025-05-23 PROCEDURE — 36410 VNPNXR 3YR/> PHY/QHP DX/THER: CPT

## 2025-05-23 PROCEDURE — 05HB33Z INSERTION OF INFUSION DEVICE INTO RIGHT BASILIC VEIN, PERCUTANEOUS APPROACH: ICD-10-PCS | Performed by: STUDENT IN AN ORGANIZED HEALTH CARE EDUCATION/TRAINING PROGRAM

## 2025-05-23 PROCEDURE — 6370000000 HC RX 637 (ALT 250 FOR IP): Performed by: INTERNAL MEDICINE

## 2025-05-23 PROCEDURE — 84100 ASSAY OF PHOSPHORUS: CPT

## 2025-05-23 PROCEDURE — 6360000002 HC RX W HCPCS: Performed by: SPECIALIST

## 2025-05-23 PROCEDURE — 36415 COLL VENOUS BLD VENIPUNCTURE: CPT

## 2025-05-23 PROCEDURE — C1751 CATH, INF, PER/CENT/MIDLINE: HCPCS

## 2025-05-23 PROCEDURE — 2500000003 HC RX 250 WO HCPCS: Performed by: SPECIALIST

## 2025-05-23 PROCEDURE — 82962 GLUCOSE BLOOD TEST: CPT

## 2025-05-23 PROCEDURE — 80053 COMPREHEN METABOLIC PANEL: CPT

## 2025-05-23 PROCEDURE — 94761 N-INVAS EAR/PLS OXIMETRY MLT: CPT

## 2025-05-23 PROCEDURE — 1200000000 HC SEMI PRIVATE

## 2025-05-23 PROCEDURE — 76937 US GUIDE VASCULAR ACCESS: CPT

## 2025-05-23 RX ADMIN — METOPROLOL TARTRATE 25 MG: 25 TABLET, FILM COATED ORAL at 10:29

## 2025-05-23 RX ADMIN — ENOXAPARIN SODIUM 30 MG: 100 INJECTION SUBCUTANEOUS at 10:29

## 2025-05-23 RX ADMIN — CLONIDINE HYDROCHLORIDE 0.2 MG: 0.2 TABLET ORAL at 10:29

## 2025-05-23 RX ADMIN — METOPROLOL TARTRATE 25 MG: 25 TABLET, FILM COATED ORAL at 20:23

## 2025-05-23 RX ADMIN — SODIUM BICARBONATE 650 MG: 650 TABLET ORAL at 10:29

## 2025-05-23 RX ADMIN — FERROUS SULFATE TAB 325 MG (65 MG ELEMENTAL FE) 325 MG: 325 (65 FE) TAB at 10:29

## 2025-05-23 RX ADMIN — ATORVASTATIN CALCIUM 20 MG: 10 TABLET, FILM COATED ORAL at 20:23

## 2025-05-23 RX ADMIN — AMLODIPINE BESYLATE 10 MG: 10 TABLET ORAL at 10:29

## 2025-05-23 RX ADMIN — LEVOTHYROXINE SODIUM 88 MCG: 0.09 TABLET ORAL at 06:55

## 2025-05-23 RX ADMIN — SODIUM BICARBONATE 650 MG: 650 TABLET ORAL at 20:23

## 2025-05-23 RX ADMIN — HYDRALAZINE HYDROCHLORIDE 100 MG: 50 TABLET ORAL at 15:31

## 2025-05-23 RX ADMIN — HYDRALAZINE HYDROCHLORIDE 100 MG: 50 TABLET ORAL at 06:14

## 2025-05-23 RX ADMIN — SODIUM CHLORIDE, PRESERVATIVE FREE 10 ML: 5 INJECTION INTRAVENOUS at 20:24

## 2025-05-23 RX ADMIN — PANTOPRAZOLE SODIUM 40 MG: 40 TABLET, DELAYED RELEASE ORAL at 06:55

## 2025-05-23 RX ADMIN — INSULIN GLARGINE 15 UNITS: 100 INJECTION, SOLUTION SUBCUTANEOUS at 10:29

## 2025-05-23 RX ADMIN — MEROPENEM 500 MG: 500 INJECTION, POWDER, FOR SOLUTION INTRAVENOUS at 01:03

## 2025-05-23 RX ADMIN — Medication 16 G: at 18:12

## 2025-05-23 RX ADMIN — SENNOSIDES 17.2 MG: 8.6 TABLET, FILM COATED ORAL at 10:29

## 2025-05-23 RX ADMIN — MEROPENEM 500 MG: 500 INJECTION, POWDER, FOR SOLUTION INTRAVENOUS at 13:28

## 2025-05-23 RX ADMIN — SODIUM CHLORIDE, PRESERVATIVE FREE 10 ML: 5 INJECTION INTRAVENOUS at 10:32

## 2025-05-23 RX ADMIN — ASPIRIN 81 MG CHEWABLE TABLET 81 MG: 81 TABLET CHEWABLE at 20:23

## 2025-05-23 RX ADMIN — SODIUM ZIRCONIUM CYCLOSILICATE 10 G: 10 POWDER, FOR SUSPENSION ORAL at 10:29

## 2025-05-23 RX ADMIN — CLONIDINE HYDROCHLORIDE 0.2 MG: 0.2 TABLET ORAL at 20:23

## 2025-05-23 ASSESSMENT — PAIN SCALES - GENERAL
PAINLEVEL_OUTOF10: 0
PAINLEVEL_OUTOF10: 0

## 2025-05-23 ASSESSMENT — PAIN SCALES - WONG BAKER
WONGBAKER_NUMERICALRESPONSE: NO HURT
WONGBAKER_NUMERICALRESPONSE: NO HURT

## 2025-05-23 NOTE — CARE COORDINATION
Received update from Loree, financial counselor, that patient is over the income for both LTC and regular medicaid. CM placed call to patients son xAel and discussed information and need for private pay for SNF if that is where they would like patient to go at discharge. Axel stated that he plans to take the patient back home w/ family as patients other son lives with patient and is home. CM discussed HHC, Axel reports that patient is active with [facility] but does not remember the companies name. Axel stated that he will call the contact he has at [facility] and give them this CM phone number so they can call to get information needed for discharge. CM also asked Axel to return this CM call with company name so that CM is not waiting for them to call as patient is medically ready for discharge as soon as plan is set. Axel voiced understanding and stated he would return this CM call once company name is known.

## 2025-05-23 NOTE — CONSULTS
Patient:   Greg Easton    Date:  25  :  1940, 84 y.o.   Nephrologist: Kade Suarez MD  Provider: Jacobo Zazueta MD    Reason for Consult:  Acute kidney injury with underlying chronic kidney disease stage G4 A3    Consult requested by : Reinier Martinez MD       Chief Complaint:     Altered mental status      HISTORY OF PRESENT ILLNESS/Brief hospital course  AND  brief background history    Mr. Easton, is a 84-year young male, who was brought to the emergency department from a local nursing home via emergency medical service with altered mental status on May 15, 2025.  I was able to review the emergency medical service note.  His blood pressure was little high 170s over 80s, temperature 99.6, the son was present on scene, apparently he was talking that does not make sense for the last 24 hours prior to emergency room presentation, it was presumed that he might have genitourinary infection since he has suprapubic catheter.  He was subsequently brought to our emergency department.    On arrival in our emergency department, he remained afebrile blood pressure recorded 154/85, oxygen saturation 96%.  Imaging study which include computed tomography of the head was unrevealing.  Biochemical testing showed potassium 5.3, serum bicarbonate 19 with concomitant glucose of 266 and creatinine 2.9 mg/dL.  Other pertinent abnormal lab include hemoglobin of 8.9 and N-terminal pro brain type natruretic peptide more than 7000.  Appropriate bodily fluid culture were drawn and empirical antimicrobial agent were initiated and he was subsequently admitted to medical floor.    Since admission, he was seen by urology service and underwent left cystoscopy ureteral stent exchange on May 19, 2025.  Urine output showed gram-negative colton as well as mixed skin urogenital jean.  His antihypertensive medication restarted with acceptable blood pressure, empirical antimicrobial agent continued.  
 [facility] Nurse  Consult Note       Greg Easton  AGE: 84 y.o.   GENDER: male  : 1940  TODAY'S DATE:  5/15/2025    Subjective:     Reason for  Evaluation and Assessment: wound care eval.      Greg Easton is a 84 y.o. male referred by:   [x] Physician  [] Nursing  [] Other:     Wound Identification:  Wound Type: pressure and traumatic  Contributing Factors: chronic pressure, decreased mobility, and malnutrition, diabetes        PAST MEDICAL HISTORY        Diagnosis Date    Acute kidney failure     Acute urinary tract infection 03/15/2012    Anemia     Ataxia 2010    Ataxia     Bacteremia     Candidiasis     Chronic combined systolic and diastolic congestive heart failure (HCC)     Chronic kidney disease     Cognitive communication deficit     Diabetes mellitus (HCC) 2011    type 2, controlled    Extended spectrum beta lactamase (ESBL) resistance     Fusion of spine of cervical region 10/01/2012    Gait disturbance 2011    History of prostate cancer 1996    adenocarcinoma    History of tobacco use 1959    Hydronephrosis     Hyperkalemia     Hyperlipidemia 2011    Hypertension     Hypertensive heart disease with CHF (congestive heart failure) (HCC)     Hypotension     Immunodeficiency     Metabolic encephalopathy     Muscle weakness     Osteoarthritis 2011    Osteoarthritis     Secondary Hyperaldosteronism (HCC)     Supraventricular tachycardia     Tubulo-interstitial nephritis        PAST SURGICAL HISTORY    Past Surgical History:   Procedure Laterality Date    BLADDER SURGERY      BLADDER SURGERY Left 2022    CYSTOSCOPY LEFT STENT EXCHANGE performed by Bal Mckeon MD at [facility] OR    CHANGE OF BLADDER TUBE,COMPLICATED  2024    COLON SURGERY      CYSTOSCOPY Left 2022    LEFT CYSTOSCOPY URETERAL STENT EXCHANGE AND SUPRABUPIC PACE CATHETER EXCHANGE performed by Mirella Wilkins MD at [facility] OR    CYSTOSCOPY Left 10/31/2023 
Comprehensive Nutrition Assessment    Type and Reason for Visit:  Initial, Consult (Nutritional Assessment for Judd Score)    Nutrition Recommendations/Plan:   Continue Regular Diet  Begin diabetic oral nutrition supplement tid, between meals     Malnutrition Assessment:  Malnutrition Status:  At risk for malnutrition (05/16/25 1436)    Context:  Chronic Illness       Nutrition Assessment:    Pt admitted with complicated UTI, acute encephalopathy x 2 days, dementia, chronic anemia. H/O recurrent complicated UTI, chronic suprapubic catheter, dementia, left ankle wound, hypothyroidism, hyperlipidemia, prostate cancer s/p prostatectomy, chronic left hydronephrosis (managed with left ureteral stent), CKD4, DM2. Wounds noted, Judd nutrition score is 2. If current wt is accurate, no wt loss noted over the past yr, 10% gain noted.    Nutrition Related Findings:    iron, reglan, senokot Na 146, BUN 62, Cr 3.1, GFR 19, Glu 248, H/H 7.7/26.4 Wound Type: Multiple, Pressure Injury, Unstageable (Traumatic)       Current Nutrition Intake & Therapies:    Average Meal Intake: Unable to assess  Average Supplements Intake: None Ordered  ADULT DIET; Regular    Anthropometric Measures:  Height: 172.7 cm (5' 8\")  Ideal Body Weight (IBW): 154 lbs (70 kg)    Admission Body Weight: 86.2 kg (190 lb 0.6 oz)  Current Body Weight: 86.2 kg (190 lb 0.6 oz),   IBW.    Current BMI (kg/m2): 28.9  Usual Body Weight: 78.5 kg (173 lb 1 oz) (5/18/24)     % Weight Change (Calculated): 9.8                         Estimated Daily Nutrient Needs:  Energy Requirements Based On: Kcal/kg  Weight Used for Energy Requirements: Current  Energy (kcal/day): 3082-4559 (35-30 kcal/kg)  Weight Used for Protein Requirements: Current  Protein (g/day):  (1-1.4 g/kg) as renal function allows  Method Used for Fluid Requirements: 1 ml/kcal  Fluid (ml/day): 2150    Nutrition Diagnosis:   Predicted inadequate energy intake related to increase demand for 
Consult completed. Indication for advanced access: OutPt Merrem IV infusions until 5/26/25. #20ga PowerGlide Pro Extended Dwell MidLine Catheter initiated to RUE Basilic Vein using sterile, UltraSound-guided technique without difficulty/complications. Positioning verified via UltraSound visualization of catheter within vessel lumen; site returns blood briskly and flushes without resistance/abnormalities. Sterile dressing with SkinPrep, StatLock Securing Device, CHG gel DSSG, SwabCap, and Limb Precautions band applied. Pt tolerated well & no other c/o or needs noted or reported. ISHMAEL aTte notified.     
Consult completed. Procedure/rationale explained to pt & consent obtained. #22ga Nexiva Diffusics high-pressure-injectable extra-long, 1.75\" PIV initiated using UltraSound-guided technique without difficulty/complications. Lab specimens drawn and given to Phlebot @bedside. Pt tolerated well and no other c/o or needs noted or reported.     
[facility] ACUTE CARE PHYSICAL THERAPY EVALUATION  Greg Easton, 1940, 3124/3124-A, 5/19/2025    History  [place]:  The primary encounter diagnosis was Sepsis, due to unspecified organism, unspecified whether acute organ dysfunction present ([facility]). Diagnoses of Complicated UTI (urinary tract infection), Acute encephalopathy, Stage 3a chronic kidney disease (HCC), and Chronic anemia were also pertinent to this visit.  Patient  has a past medical history of Acute kidney failure, Acute urinary tract infection, Anemia, Ataxia, Ataxia, Bacteremia, Candidiasis, Chronic combined systolic and diastolic congestive heart failure ([facility]), Chronic kidney disease, Cognitive communication deficit, Diabetes mellitus ([facility]), Extended spectrum beta lactamase (ESBL) resistance, Fusion of spine of cervical region, Gait disturbance, History of prostate cancer, History of tobacco use, Hydronephrosis, Hyperkalemia, Hyperlipidemia, Hypertension, Hypertensive heart disease with CHF (congestive heart failure) ([facility]), Hypotension, Immunodeficiency, Metabolic encephalopathy, Muscle weakness, Osteoarthritis, Osteoarthritis, Secondary Hyperaldosteronism ([facility]), Supraventricular tachycardia, and Tubulo-interstitial nephritis.  Patient  has a past surgical history that includes Prostate surgery; other surgical history (03/28/2013); Colon surgery; Bladder surgery; Prostatectomy (1996); Bladder surgery (Left, 03/17/2022); Cystoscopy (Left, 09/07/2022); Cystoscopy (Left, 10/31/2023); change of bladder tube,complicated (5/12/2024); Cystoscopy (Left, 5/13/2024); Upper gastrointestinal endoscopy (N/A, 8/8/2024); Cystoscopy (Left, 12/10/2024); and Foot Debridement (Left, 12/18/2024).    Discharge Recommendation: Facility for moderate post-acute rehabilitation, anticipate 1-2 hours per day and 5 days per week.     Equipment: TBD at next level of care    Subjective:    Patient states:  \"People are getting me mixed up with another man. Somebody 
border 05/21/25 1018   Wound Length (cm) 3 cm 05/21/25 1018   Wound Width (cm) 4 cm 05/21/25 1018   Wound Depth (cm) 0.1 cm 05/21/25 1018   Wound Surface Area (cm^2) 12 cm^2 05/21/25 1018   Change in Wound Size % (l*w) 40 05/21/25 1018   Wound Volume (cm^3) 1.2 cm^3 05/21/25 1018   Wound Healing % 40 05/21/25 1018   Distance Tunneling (cm) 0 cm 05/21/25 1018   Tunneling Position ___ O'Clock 0 05/21/25 1018   Undermining Starts ___ O'Clock 0 05/21/25 1018   Undermining Ends___ O'Clock 0 05/21/25 1018   Undermining Maxium Distance (cm) 0 05/21/25 1018   Wound Assessment Pink/red 05/21/25 1018   Drainage Amount Moderate (25-50%) 05/21/25 1018   Drainage Description Serosanguinous 05/21/25 1018   Odor None 05/21/25 1018   Kimberly-wound Assessment Fragile 05/21/25 1018   Margins Defined edges 05/21/25 1018   Wound Thickness Description not for Pressure Injury Full thickness 05/21/25 1018   Number of days: 5       Wound 05/15/25 Knee Right (Active)   Wound Image   05/15/25 1440   Wound Etiology Traumatic 05/21/25 1018   Dressing Status Dry 05/21/25 1018   Wound Cleansed Cleansed with saline 05/20/25 0400   Dressing/Treatment Open to air 05/21/25 1018   Wound Length (cm) 0.4 cm 05/21/25 1018   Wound Width (cm) 0.4 cm 05/21/25 1018   Wound Depth (cm) 0.1 cm 05/21/25 1018   Wound Surface Area (cm^2) 0.16 cm^2 05/21/25 1018   Change in Wound Size % (l*w) 0 05/21/25 1018   Wound Volume (cm^3) 0.016 cm^3 05/21/25 1018   Wound Healing % 0 05/21/25 1018   Distance Tunneling (cm) 0 cm 05/21/25 1018   Tunneling Position ___ O'Clock 0 05/21/25 1018   Undermining Starts ___ O'Clock 0 05/21/25 1018   Undermining Ends___ O'Clock 0 05/21/25 1018   Undermining Maxium Distance (cm) 0 05/21/25 1018   Wound Assessment Eschar dry 05/21/25 1018   Drainage Amount None (dry) 05/21/25 1018   Odor None 05/20/25 0400   Kimberly-wound Assessment Intact 05/21/25 1018   Margins Attached edges 05/21/25 1018   Wound Thickness Description not for Pressure 
urine cx pending   WBC 7.7, afebrile   On IV Rocephin; abx per primary     4) H/o Prostate Cancer: S/p RPP with Dr. Jose in 1996. PSA 0.71 6/2024.    Will follow.  Patient seen and examined, chart reviewed.     Electronically signed by Jon Cherry PA-C on 5/16/2025 at 9:57 AM

## 2025-05-24 LAB
ABO + RH BLD: NORMAL
ANION GAP SERPL CALCULATED.3IONS-SCNC: 17 MMOL/L (ref 9–17)
BLOOD BANK COMMENT: NORMAL
BLOOD BANK SAMPLE EXPIRATION: NORMAL
BLOOD GROUP ANTIBODIES SERPL: NEGATIVE
BUN SERPL-MCNC: 47 MG/DL (ref 7–20)
CALCIUM SERPL-MCNC: 8.4 MG/DL (ref 8.3–10.6)
CHLORIDE SERPL-SCNC: 110 MMOL/L (ref 99–110)
CO2 SERPL-SCNC: 12 MMOL/L (ref 21–32)
CREAT SERPL-MCNC: 2.4 MG/DL (ref 0.8–1.3)
GFR, ESTIMATED: 26 ML/MIN/1.73M2
GLUCOSE BLD-MCNC: 184 MG/DL (ref 74–99)
GLUCOSE BLD-MCNC: 189 MG/DL (ref 74–99)
GLUCOSE BLD-MCNC: 204 MG/DL (ref 74–99)
GLUCOSE BLD-MCNC: 229 MG/DL (ref 74–99)
GLUCOSE BLD-MCNC: 38 MG/DL (ref 74–99)
GLUCOSE BLD-MCNC: 50 MG/DL (ref 74–99)
GLUCOSE BLD-MCNC: 64 MG/DL (ref 74–99)
GLUCOSE BLD-MCNC: 83 MG/DL (ref 74–99)
GLUCOSE BLD-MCNC: 88 MG/DL (ref 74–99)
GLUCOSE SERPL-MCNC: 225 MG/DL (ref 74–99)
POTASSIUM SERPL-SCNC: 5.3 MMOL/L (ref 3.5–5.1)
SODIUM SERPL-SCNC: 139 MMOL/L (ref 136–145)

## 2025-05-24 PROCEDURE — 36415 COLL VENOUS BLD VENIPUNCTURE: CPT

## 2025-05-24 PROCEDURE — 1200000000 HC SEMI PRIVATE

## 2025-05-24 PROCEDURE — 2580000003 HC RX 258: Performed by: SPECIALIST

## 2025-05-24 PROCEDURE — 86901 BLOOD TYPING SEROLOGIC RH(D): CPT

## 2025-05-24 PROCEDURE — 6360000002 HC RX W HCPCS: Performed by: SPECIALIST

## 2025-05-24 PROCEDURE — 86850 RBC ANTIBODY SCREEN: CPT

## 2025-05-24 PROCEDURE — 86900 BLOOD TYPING SEROLOGIC ABO: CPT

## 2025-05-24 PROCEDURE — 82962 GLUCOSE BLOOD TEST: CPT

## 2025-05-24 PROCEDURE — 80048 BASIC METABOLIC PNL TOTAL CA: CPT

## 2025-05-24 PROCEDURE — 94761 N-INVAS EAR/PLS OXIMETRY MLT: CPT

## 2025-05-24 PROCEDURE — 6370000000 HC RX 637 (ALT 250 FOR IP): Performed by: SPECIALIST

## 2025-05-24 PROCEDURE — 2500000003 HC RX 250 WO HCPCS: Performed by: SPECIALIST

## 2025-05-24 PROCEDURE — 6370000000 HC RX 637 (ALT 250 FOR IP): Performed by: INTERNAL MEDICINE

## 2025-05-24 RX ORDER — SODIUM BICARBONATE 650 MG/1
1300 TABLET ORAL 2 TIMES DAILY
Status: DISCONTINUED | OUTPATIENT
Start: 2025-05-24 | End: 2025-05-26 | Stop reason: HOSPADM

## 2025-05-24 RX ORDER — INSULIN GLARGINE 100 [IU]/ML
10 INJECTION, SOLUTION SUBCUTANEOUS DAILY
Status: DISCONTINUED | OUTPATIENT
Start: 2025-05-24 | End: 2025-05-26 | Stop reason: HOSPADM

## 2025-05-24 RX ORDER — CLONIDINE HYDROCHLORIDE 0.2 MG/1
0.2 TABLET ORAL 3 TIMES DAILY
Status: DISCONTINUED | OUTPATIENT
Start: 2025-05-24 | End: 2025-05-26 | Stop reason: HOSPADM

## 2025-05-24 RX ADMIN — HYDRALAZINE HYDROCHLORIDE 100 MG: 50 TABLET ORAL at 22:12

## 2025-05-24 RX ADMIN — ATORVASTATIN CALCIUM 20 MG: 10 TABLET, FILM COATED ORAL at 21:20

## 2025-05-24 RX ADMIN — AMLODIPINE BESYLATE 10 MG: 10 TABLET ORAL at 10:20

## 2025-05-24 RX ADMIN — MEROPENEM 500 MG: 500 INJECTION, POWDER, FOR SOLUTION INTRAVENOUS at 12:47

## 2025-05-24 RX ADMIN — ENOXAPARIN SODIUM 30 MG: 100 INJECTION SUBCUTANEOUS at 10:21

## 2025-05-24 RX ADMIN — SODIUM BICARBONATE 1300 MG: 650 TABLET ORAL at 21:20

## 2025-05-24 RX ADMIN — INSULIN LISPRO 2 UNITS: 100 INJECTION, SOLUTION INTRAVENOUS; SUBCUTANEOUS at 17:37

## 2025-05-24 RX ADMIN — MEROPENEM 500 MG: 500 INJECTION, POWDER, FOR SOLUTION INTRAVENOUS at 00:16

## 2025-05-24 RX ADMIN — PANTOPRAZOLE SODIUM 40 MG: 40 TABLET, DELAYED RELEASE ORAL at 06:17

## 2025-05-24 RX ADMIN — SODIUM CHLORIDE, PRESERVATIVE FREE 10 ML: 5 INJECTION INTRAVENOUS at 10:32

## 2025-05-24 RX ADMIN — FERROUS SULFATE TAB 325 MG (65 MG ELEMENTAL FE) 325 MG: 325 (65 FE) TAB at 10:20

## 2025-05-24 RX ADMIN — INSULIN LISPRO 2 UNITS: 100 INJECTION, SOLUTION INTRAVENOUS; SUBCUTANEOUS at 12:49

## 2025-05-24 RX ADMIN — METOPROLOL TARTRATE 25 MG: 25 TABLET, FILM COATED ORAL at 21:19

## 2025-05-24 RX ADMIN — SODIUM BICARBONATE 650 MG: 650 TABLET ORAL at 10:21

## 2025-05-24 RX ADMIN — CLONIDINE HYDROCHLORIDE 0.2 MG: 0.2 TABLET ORAL at 21:20

## 2025-05-24 RX ADMIN — LEVOTHYROXINE SODIUM 88 MCG: 0.09 TABLET ORAL at 06:17

## 2025-05-24 RX ADMIN — SENNOSIDES 17.2 MG: 8.6 TABLET, FILM COATED ORAL at 10:28

## 2025-05-24 RX ADMIN — METOPROLOL TARTRATE 25 MG: 25 TABLET, FILM COATED ORAL at 10:20

## 2025-05-24 RX ADMIN — CLONIDINE HYDROCHLORIDE 0.2 MG: 0.2 TABLET ORAL at 10:21

## 2025-05-24 RX ADMIN — DEXTROSE MONOHYDRATE 125 ML: 100 INJECTION, SOLUTION INTRAVENOUS at 02:46

## 2025-05-24 RX ADMIN — INSULIN LISPRO 2 UNITS: 100 INJECTION, SOLUTION INTRAVENOUS; SUBCUTANEOUS at 21:19

## 2025-05-24 RX ADMIN — SODIUM CHLORIDE, PRESERVATIVE FREE 10 ML: 5 INJECTION INTRAVENOUS at 21:25

## 2025-05-24 RX ADMIN — Medication 16 G: at 07:47

## 2025-05-24 RX ADMIN — ASPIRIN 81 MG CHEWABLE TABLET 81 MG: 81 TABLET CHEWABLE at 21:20

## 2025-05-24 RX ADMIN — SODIUM CHLORIDE 10 ML: 0.9 INJECTION, SOLUTION INTRAVENOUS at 00:16

## 2025-05-24 RX ADMIN — HYDRALAZINE HYDROCHLORIDE 100 MG: 50 TABLET ORAL at 06:19

## 2025-05-24 ASSESSMENT — PAIN SCALES - GENERAL
PAINLEVEL_OUTOF10: 0

## 2025-05-24 ASSESSMENT — PAIN SCALES - WONG BAKER
WONGBAKER_NUMERICALRESPONSE: NO HURT
WONGBAKER_NUMERICALRESPONSE: NO HURT

## 2025-05-25 LAB
GLUCOSE BLD-MCNC: 127 MG/DL (ref 74–99)
GLUCOSE BLD-MCNC: 132 MG/DL (ref 74–99)
GLUCOSE BLD-MCNC: 230 MG/DL (ref 74–99)
GLUCOSE BLD-MCNC: 254 MG/DL (ref 74–99)
GLUCOSE BLD-MCNC: 96 MG/DL (ref 74–99)

## 2025-05-25 PROCEDURE — 1200000000 HC SEMI PRIVATE

## 2025-05-25 PROCEDURE — 6360000002 HC RX W HCPCS: Performed by: SPECIALIST

## 2025-05-25 PROCEDURE — 82962 GLUCOSE BLOOD TEST: CPT

## 2025-05-25 PROCEDURE — 97530 THERAPEUTIC ACTIVITIES: CPT

## 2025-05-25 PROCEDURE — 6370000000 HC RX 637 (ALT 250 FOR IP): Performed by: SPECIALIST

## 2025-05-25 PROCEDURE — 94761 N-INVAS EAR/PLS OXIMETRY MLT: CPT

## 2025-05-25 PROCEDURE — 97110 THERAPEUTIC EXERCISES: CPT

## 2025-05-25 PROCEDURE — 6370000000 HC RX 637 (ALT 250 FOR IP): Performed by: STUDENT IN AN ORGANIZED HEALTH CARE EDUCATION/TRAINING PROGRAM

## 2025-05-25 PROCEDURE — 2580000003 HC RX 258: Performed by: SPECIALIST

## 2025-05-25 PROCEDURE — 6370000000 HC RX 637 (ALT 250 FOR IP): Performed by: INTERNAL MEDICINE

## 2025-05-25 PROCEDURE — 2500000003 HC RX 250 WO HCPCS: Performed by: SPECIALIST

## 2025-05-25 RX ADMIN — AMLODIPINE BESYLATE 10 MG: 10 TABLET ORAL at 09:46

## 2025-05-25 RX ADMIN — SODIUM CHLORIDE, PRESERVATIVE FREE 10 ML: 5 INJECTION INTRAVENOUS at 09:46

## 2025-05-25 RX ADMIN — ASPIRIN 81 MG CHEWABLE TABLET 81 MG: 81 TABLET CHEWABLE at 21:01

## 2025-05-25 RX ADMIN — CLONIDINE HYDROCHLORIDE 0.2 MG: 0.2 TABLET ORAL at 21:01

## 2025-05-25 RX ADMIN — METOPROLOL TARTRATE 25 MG: 25 TABLET, FILM COATED ORAL at 09:45

## 2025-05-25 RX ADMIN — LEVOTHYROXINE SODIUM 88 MCG: 0.09 TABLET ORAL at 05:52

## 2025-05-25 RX ADMIN — INSULIN LISPRO 2 UNITS: 100 INJECTION, SOLUTION INTRAVENOUS; SUBCUTANEOUS at 11:00

## 2025-05-25 RX ADMIN — INSULIN GLARGINE 10 UNITS: 100 INJECTION, SOLUTION SUBCUTANEOUS at 09:46

## 2025-05-25 RX ADMIN — SENNOSIDES 17.2 MG: 8.6 TABLET, FILM COATED ORAL at 09:46

## 2025-05-25 RX ADMIN — ENOXAPARIN SODIUM 30 MG: 100 INJECTION SUBCUTANEOUS at 09:46

## 2025-05-25 RX ADMIN — HYDRALAZINE HYDROCHLORIDE 100 MG: 50 TABLET ORAL at 05:52

## 2025-05-25 RX ADMIN — SODIUM BICARBONATE 1300 MG: 650 TABLET ORAL at 21:01

## 2025-05-25 RX ADMIN — SODIUM BICARBONATE 1300 MG: 650 TABLET ORAL at 09:45

## 2025-05-25 RX ADMIN — PANTOPRAZOLE SODIUM 40 MG: 40 TABLET, DELAYED RELEASE ORAL at 05:52

## 2025-05-25 RX ADMIN — SODIUM CHLORIDE, PRESERVATIVE FREE 10 ML: 5 INJECTION INTRAVENOUS at 21:03

## 2025-05-25 RX ADMIN — HYDRALAZINE HYDROCHLORIDE 100 MG: 50 TABLET ORAL at 22:32

## 2025-05-25 RX ADMIN — ERGOCALCIFEROL 50000 UNITS: 1.25 CAPSULE ORAL at 09:46

## 2025-05-25 RX ADMIN — MEROPENEM 500 MG: 500 INJECTION, POWDER, FOR SOLUTION INTRAVENOUS at 13:05

## 2025-05-25 RX ADMIN — INSULIN LISPRO 4 UNITS: 100 INJECTION, SOLUTION INTRAVENOUS; SUBCUTANEOUS at 21:01

## 2025-05-25 RX ADMIN — CLONIDINE HYDROCHLORIDE 0.2 MG: 0.2 TABLET ORAL at 09:45

## 2025-05-25 RX ADMIN — ATORVASTATIN CALCIUM 20 MG: 10 TABLET, FILM COATED ORAL at 21:05

## 2025-05-25 RX ADMIN — CLONIDINE HYDROCHLORIDE 0.2 MG: 0.2 TABLET ORAL at 13:00

## 2025-05-25 RX ADMIN — MEROPENEM 500 MG: 500 INJECTION, POWDER, FOR SOLUTION INTRAVENOUS at 00:39

## 2025-05-25 RX ADMIN — FERROUS SULFATE TAB 325 MG (65 MG ELEMENTAL FE) 325 MG: 325 (65 FE) TAB at 09:46

## 2025-05-25 RX ADMIN — HYDRALAZINE HYDROCHLORIDE 100 MG: 50 TABLET ORAL at 13:00

## 2025-05-25 RX ADMIN — SODIUM ZIRCONIUM CYCLOSILICATE 10 G: 10 POWDER, FOR SUSPENSION ORAL at 09:50

## 2025-05-25 ASSESSMENT — PAIN SCALES - GENERAL
PAINLEVEL_OUTOF10: 0
PAINLEVEL_OUTOF10: 0

## 2025-05-25 ASSESSMENT — PAIN SCALES - WONG BAKER: WONGBAKER_NUMERICALRESPONSE: NO HURT

## 2025-05-25 NOTE — CARE COORDINATION
Cm received PS from Dr Moore stating that he would like to discharge pt today and wondered if the HC was set up. Chart reviewed and on 5/23 pt/family could not remember the name of the HC agency that has been seeing pt. Cm attempted to contact pt's son, Axel, with VM message left requesting return call re; MD plan to discharge pt today and need to know the name of pt's HC agency so that services may be resumed. Awaiting return call.    1109 Son returned call to  and still does not know for sure the name of the HC agency that is seeing his father. Son states that he is in [place] and wants to know if discharge can be put off until Tues 5/27 because that have had a death in the family, other things going on. CM explained that once pt is determined to be medically stable pt's are discharged. Son very dissatisfied with MD plan for discharge today as it is inconvenient for the family at this time. PS to MD to advise that son would like a call. Son advised that he will call in approx 1 hr with name of the HC agency.    1130  spoke with Dr Moore and he will reach out to son. Per  since pt has one day left of IVAB on 5/26 will hopefully plan discharge to home tomorrow.    1327 CM received VM from [facility] with [facility] requesting return call re; plans for pt. Teena advised that [facility] has plans to place pt on hospice when he comes home. CM attempted to return call to Teena with [facility] with VM message left requesting return call. PS to Dr Moore re; [facility] indicating that there plan is to place pt on hospice when he gets home.     1350 CM received return call from Teena with [facility] and hospice. She states that pt was at home on hospice services with them for a short while before he was admitted and the plan was for him to return home with hospice services once his IVAB were completed. If IVAB are completed 5/26 and if pt is discharged please contact Teena with [facility] at ph#[phone]

## 2025-05-26 VITALS
WEIGHT: 181.88 LBS | OXYGEN SATURATION: 100 % | TEMPERATURE: 97.3 F | HEART RATE: 54 BPM | RESPIRATION RATE: 16 BRPM | SYSTOLIC BLOOD PRESSURE: 122 MMHG | HEIGHT: 68 IN | BODY MASS INDEX: 27.57 KG/M2 | DIASTOLIC BLOOD PRESSURE: 51 MMHG

## 2025-05-26 LAB
ALBUMIN SERPL-MCNC: 2.8 G/DL (ref 3.4–5)
ALBUMIN/GLOB SERPL: 0.9 {RATIO} (ref 1.1–2.2)
ALP SERPL-CCNC: 105 U/L (ref 40–129)
ALT SERPL-CCNC: 56 U/L (ref 10–40)
ANION GAP SERPL CALCULATED.3IONS-SCNC: 11 MMOL/L (ref 9–17)
AST SERPL-CCNC: 81 U/L (ref 15–37)
BILIRUB SERPL-MCNC: <0.2 MG/DL (ref 0–1)
BUN SERPL-MCNC: 50 MG/DL (ref 7–20)
CALCIUM SERPL-MCNC: 8.5 MG/DL (ref 8.3–10.6)
CHLORIDE SERPL-SCNC: 111 MMOL/L (ref 99–110)
CO2 SERPL-SCNC: 20 MMOL/L (ref 21–32)
CREAT SERPL-MCNC: 2.3 MG/DL (ref 0.8–1.3)
GFR, ESTIMATED: 27 ML/MIN/1.73M2
GLUCOSE BLD-MCNC: 155 MG/DL (ref 74–99)
GLUCOSE BLD-MCNC: 246 MG/DL (ref 74–99)
GLUCOSE SERPL-MCNC: 108 MG/DL (ref 74–99)
POTASSIUM SERPL-SCNC: 4.6 MMOL/L (ref 3.5–5.1)
PROT SERPL-MCNC: 6 G/DL (ref 6.4–8.2)
SODIUM SERPL-SCNC: 142 MMOL/L (ref 136–145)

## 2025-05-26 PROCEDURE — 6360000002 HC RX W HCPCS: Performed by: SPECIALIST

## 2025-05-26 PROCEDURE — 6370000000 HC RX 637 (ALT 250 FOR IP): Performed by: SPECIALIST

## 2025-05-26 PROCEDURE — 80053 COMPREHEN METABOLIC PANEL: CPT

## 2025-05-26 PROCEDURE — 6360000002 HC RX W HCPCS: Performed by: STUDENT IN AN ORGANIZED HEALTH CARE EDUCATION/TRAINING PROGRAM

## 2025-05-26 PROCEDURE — 2500000003 HC RX 250 WO HCPCS: Performed by: SPECIALIST

## 2025-05-26 PROCEDURE — 6370000000 HC RX 637 (ALT 250 FOR IP): Performed by: INTERNAL MEDICINE

## 2025-05-26 PROCEDURE — 6370000000 HC RX 637 (ALT 250 FOR IP): Performed by: STUDENT IN AN ORGANIZED HEALTH CARE EDUCATION/TRAINING PROGRAM

## 2025-05-26 PROCEDURE — 82962 GLUCOSE BLOOD TEST: CPT

## 2025-05-26 PROCEDURE — 36415 COLL VENOUS BLD VENIPUNCTURE: CPT

## 2025-05-26 PROCEDURE — 2580000003 HC RX 258: Performed by: SPECIALIST

## 2025-05-26 PROCEDURE — 2580000003 HC RX 258: Performed by: STUDENT IN AN ORGANIZED HEALTH CARE EDUCATION/TRAINING PROGRAM

## 2025-05-26 PROCEDURE — 94761 N-INVAS EAR/PLS OXIMETRY MLT: CPT

## 2025-05-26 RX ORDER — ERGOCALCIFEROL 1.25 MG/1
50000 CAPSULE ORAL WEEKLY
Qty: 5 CAPSULE | Refills: 0 | Status: SHIPPED | OUTPATIENT
Start: 2025-06-01 | End: 2025-06-30

## 2025-05-26 RX ORDER — SODIUM BICARBONATE 650 MG/1
1300 TABLET ORAL 2 TIMES DAILY
Qty: 120 TABLET | Refills: 0 | Status: SHIPPED | OUTPATIENT
Start: 2025-05-26 | End: 2025-06-25

## 2025-05-26 RX ORDER — METOCLOPRAMIDE 10 MG/1
10 TABLET ORAL 3 TIMES DAILY PRN
Qty: 120 TABLET | Refills: 3 | Status: SHIPPED | OUTPATIENT
Start: 2025-05-26

## 2025-05-26 RX ADMIN — MEROPENEM 500 MG: 500 INJECTION, POWDER, FOR SOLUTION INTRAVENOUS at 00:57

## 2025-05-26 RX ADMIN — HYDRALAZINE HYDROCHLORIDE 100 MG: 50 TABLET ORAL at 13:58

## 2025-05-26 RX ADMIN — FERROUS SULFATE TAB 325 MG (65 MG ELEMENTAL FE) 325 MG: 325 (65 FE) TAB at 09:51

## 2025-05-26 RX ADMIN — ENOXAPARIN SODIUM 30 MG: 100 INJECTION SUBCUTANEOUS at 09:51

## 2025-05-26 RX ADMIN — CLONIDINE HYDROCHLORIDE 0.2 MG: 0.2 TABLET ORAL at 09:51

## 2025-05-26 RX ADMIN — INSULIN GLARGINE 10 UNITS: 100 INJECTION, SOLUTION SUBCUTANEOUS at 09:51

## 2025-05-26 RX ADMIN — AMLODIPINE BESYLATE 10 MG: 10 TABLET ORAL at 09:52

## 2025-05-26 RX ADMIN — HYDRALAZINE HYDROCHLORIDE 100 MG: 50 TABLET ORAL at 06:19

## 2025-05-26 RX ADMIN — METOPROLOL TARTRATE 25 MG: 25 TABLET, FILM COATED ORAL at 09:51

## 2025-05-26 RX ADMIN — SODIUM ZIRCONIUM CYCLOSILICATE 10 G: 10 POWDER, FOR SUSPENSION ORAL at 09:51

## 2025-05-26 RX ADMIN — LEVOTHYROXINE SODIUM 88 MCG: 0.09 TABLET ORAL at 06:20

## 2025-05-26 RX ADMIN — SODIUM BICARBONATE 1300 MG: 650 TABLET ORAL at 09:51

## 2025-05-26 RX ADMIN — INSULIN LISPRO 2 UNITS: 100 INJECTION, SOLUTION INTRAVENOUS; SUBCUTANEOUS at 14:06

## 2025-05-26 RX ADMIN — SODIUM CHLORIDE, PRESERVATIVE FREE 10 ML: 5 INJECTION INTRAVENOUS at 09:51

## 2025-05-26 RX ADMIN — PANTOPRAZOLE SODIUM 40 MG: 40 TABLET, DELAYED RELEASE ORAL at 06:20

## 2025-05-26 RX ADMIN — SENNOSIDES 17.2 MG: 8.6 TABLET, FILM COATED ORAL at 09:51

## 2025-05-26 RX ADMIN — MEROPENEM 500 MG: 500 INJECTION, POWDER, FOR SOLUTION INTRAVENOUS at 14:01

## 2025-05-26 RX ADMIN — CLONIDINE HYDROCHLORIDE 0.2 MG: 0.2 TABLET ORAL at 13:58

## 2025-05-26 RX ADMIN — SODIUM CHLORIDE: 0.9 INJECTION, SOLUTION INTRAVENOUS at 00:56

## 2025-05-26 RX ADMIN — SODIUM CHLORIDE: 0.9 INJECTION, SOLUTION INTRAVENOUS at 13:59

## 2025-05-26 ASSESSMENT — PAIN SCALES - GENERAL
PAINLEVEL_OUTOF10: 0

## 2025-05-26 NOTE — PLAN OF CARE
Problem: Chronic Conditions and Co-morbidities  Goal: Patient's chronic conditions and co-morbidity symptoms are monitored and maintained or improved  5/16/2025 0112 by Carlos Eduardo Zelaya RN  Outcome: Progressing  5/15/2025 1833 by Tomasa White RN  Outcome: Progressing     Problem: Safety - Adult  Goal: Free from fall injury  5/16/2025 0112 by Carlos Eduardo Zelaya RN  Outcome: Progressing  5/15/2025 1833 by Tomasa White RN  Outcome: Progressing     Problem: Skin/Tissue Integrity  Goal: Skin integrity remains intact  Description: 1.  Monitor for areas of redness and/or skin breakdown2.  Assess vascular access sites hourly3.  Every 4-6 hours minimum:  Change oxygen saturation probe site4.  Every 4-6 hours:  If on nasal continuous positive airway pressure, respiratory therapy assess nares and determine need for appliance change or resting period  5/16/2025 0112 by Carlos Eduardo Zelaya RN  Outcome: Progressing  5/15/2025 1833 by Tomasa White RN  Outcome: Progressing     
  Problem: Chronic Conditions and Co-morbidities  Goal: Patient's chronic conditions and co-morbidity symptoms are monitored and maintained or improved  Outcome: Progressing     Problem: Skin/Tissue Integrity  Goal: Skin integrity remains intact  Description: 1.  Monitor for areas of redness and/or skin breakdown2.  Assess vascular access sites hourly3.  Every 4-6 hours minimum:  Change oxygen saturation probe site4.  Every 4-6 hours:  If on nasal continuous positive airway pressure, respiratory therapy assess nares and determine need for appliance change or resting period  Outcome: Progressing     Problem: Nutrition Deficit:  Goal: Optimize nutritional status  Outcome: Progressing     Problem: Discharge Planning  Goal: Discharge to home or other facility with appropriate resources  Outcome: Progressing     
  Problem: Safety - Adult  Goal: Free from fall injury  5/17/2025 1450 by Louisa Avalos RN  Outcome: Progressing  5/17/2025 0312 by Carlos Eduardo Zelaya RN  Outcome: Progressing     Problem: Chronic Conditions and Co-morbidities  Goal: Patient's chronic conditions and co-morbidity symptoms are monitored and maintained or improved  5/17/2025 0312 by Carlos Eduardo Zelaya RN  Outcome: Progressing     Problem: Discharge Planning  Goal: Discharge to home or other facility with appropriate resources  5/17/2025 0312 by Carlos Eduardo Zelaya RN  Outcome: Progressing     Problem: Skin/Tissue Integrity  Goal: Skin integrity remains intact  Description: 1.  Monitor for areas of redness and/or skin breakdown2.  Assess vascular access sites hourly3.  Every 4-6 hours minimum:  Change oxygen saturation probe site4.  Every 4-6 hours:  If on nasal continuous positive airway pressure, respiratory therapy assess nares and determine need for appliance change or resting period  5/17/2025 1450 by Louisa Avalos RN  Outcome: Progressing  5/17/2025 0312 by Carlos Eduardo Zelaya RN  Outcome: Progressing     Problem: ABCDS Injury Assessment  Goal: Absence of physical injury  5/17/2025 1450 by Louisa Avalos RN  Outcome: Progressing  5/17/2025 0312 by Carlos Eduardo Zelaya RN  Outcome: Progressing     Problem: Neurosensory - Adult  Goal: Achieves maximal functionality and self care  5/17/2025 0312 by Carlos Eduardo Zelaya RN  Outcome: Progressing     Problem: Pain  Goal: Verbalizes/displays adequate comfort level or baseline comfort level  5/17/2025 1450 by Louisa Avalos RN  Outcome: Progressing  5/17/2025 0312 by Carlos Eduardo Zelaya RN  Outcome: Progressing     Problem: Nutrition Deficit:  Goal: Optimize nutritional status  5/17/2025 0312 by Carlos Eduardo Zelaya RN  Outcome: Progressing     
  Problem: Safety - Adult  Goal: Free from fall injury  5/17/2025 2250 by Filemon Pacheco RN  Outcome: Progressing  5/17/2025 1450 by Louisa Avalos RN  Outcome: Progressing     Problem: Chronic Conditions and Co-morbidities  Goal: Patient's chronic conditions and co-morbidity symptoms are monitored and maintained or improved  Outcome: Progressing     Problem: Discharge Planning  Goal: Discharge to home or other facility with appropriate resources  Outcome: Progressing     Problem: Skin/Tissue Integrity  Goal: Skin integrity remains intact  5/17/2025 2250 by Filemon Pacheco RN  Outcome: Progressing  5/17/2025 1450 by Louisa Avalos RN  Outcome: Progressing     Problem: ABCDS Injury Assessment  Goal: Absence of physical injury  5/17/2025 2250 by Filemon Pacheco RN  Outcome: Progressing  5/17/2025 1450 by Louisa Avalos RN  Outcome: Progressing     Problem: Neurosensory - Adult  Goal: Achieves maximal functionality and self care  Outcome: Progressing     Problem: Pain  Goal: Verbalizes/displays adequate comfort level or baseline comfort level  5/17/2025 2250 by Filemon Pacheco RN  Outcome: Progressing  5/17/2025 1450 by Louisa Avalos RN  Outcome: Progressing     Problem: Nutrition Deficit:  Goal: Optimize nutritional status  Outcome: Progressing     
  Problem: Safety - Adult  Goal: Free from fall injury  5/18/2025 1013 by Stephan Clancy RN  Outcome: Progressing  5/17/2025 2250 by Filemon Pacheco RN  Outcome: Progressing     Problem: Chronic Conditions and Co-morbidities  Goal: Patient's chronic conditions and co-morbidity symptoms are monitored and maintained or improved  5/18/2025 1013 by Stephan Clancy RN  Outcome: Progressing  5/17/2025 2250 by Filemon Pacheco RN  Outcome: Progressing     Problem: Discharge Planning  Goal: Discharge to home or other facility with appropriate resources  5/18/2025 1013 by Stephan Clancy RN  Outcome: Progressing  5/17/2025 2250 by Filemon Pacheco RN  Outcome: Progressing     Problem: Skin/Tissue Integrity  Goal: Skin integrity remains intact  Description: 1.  Monitor for areas of redness and/or skin breakdown2.  Assess vascular access sites hourly3.  Every 4-6 hours minimum:  Change oxygen saturation probe site4.  Every 4-6 hours:  If on nasal continuous positive airway pressure, respiratory therapy assess nares and determine need for appliance change or resting period  5/18/2025 1013 by Stephan Clancy RN  Outcome: Progressing  5/17/2025 2250 by Filemon Pacheco RN  Outcome: Progressing     Problem: ABCDS Injury Assessment  Goal: Absence of physical injury  5/18/2025 1013 by Stephan Clancy RN  Outcome: Progressing  5/17/2025 2250 by Filemon Pacheco RN  Outcome: Progressing     Problem: Neurosensory - Adult  Goal: Achieves maximal functionality and self care  5/18/2025 1013 by Stephan Clancy RN  Outcome: Progressing  5/17/2025 2250 by Filemon Pacheco RN  Outcome: Progressing     Problem: Pain  Goal: Verbalizes/displays adequate comfort level or baseline comfort level  5/18/2025 1013 by Stephan Clancy RN  Outcome: Progressing  5/17/2025 2250 by Filmeon Pacheco RN  Outcome: Progressing     Problem: Nutrition Deficit:  Goal: Optimize nutritional status  5/18/2025 1013 by Stephan Clancy RN  Outcome: Progressing  5/17/2025 2250 by Filemon Pacheco 
  Problem: Safety - Adult  Goal: Free from fall injury  5/18/2025 2248 by Karina Shafer RN  Outcome: Progressing  5/18/2025 1013 by Stephan Clancy RN  Outcome: Progressing     Problem: Chronic Conditions and Co-morbidities  Goal: Patient's chronic conditions and co-morbidity symptoms are monitored and maintained or improved  5/18/2025 2248 by Karina Shafer RN  Outcome: Progressing  Flowsheets (Taken 5/18/2025 1935)  Care Plan - Patient's Chronic Conditions and Co-Morbidity Symptoms are Monitored and Maintained or Improved: Monitor and assess patient's chronic conditions and comorbid symptoms for stability, deterioration, or improvement  5/18/2025 1013 by Stephan Clancy RN  Outcome: Progressing     Problem: Discharge Planning  Goal: Discharge to home or other facility with appropriate resources  5/18/2025 2248 by Karina Shafer RN  Outcome: Progressing  Flowsheets (Taken 5/18/2025 1935)  Discharge to home or other facility with appropriate resources: Identify barriers to discharge with patient and caregiver  5/18/2025 1013 by Stephan Clancy RN  Outcome: Progressing     Problem: Skin/Tissue Integrity  Goal: Skin integrity remains intact  Description: 1.  Monitor for areas of redness and/or skin breakdown2.  Assess vascular access sites hourly3.  Every 4-6 hours minimum:  Change oxygen saturation probe site4.  Every 4-6 hours:  If on nasal continuous positive airway pressure, respiratory therapy assess nares and determine need for appliance change or resting period  5/18/2025 2248 by Karina Shafer RN  Outcome: Progressing  Flowsheets (Taken 5/18/2025 1935)  Skin Integrity Remains Intact: Monitor for areas of redness and/or skin breakdown  5/18/2025 1013 by Stephan Clancy RN  Outcome: Progressing     Problem: Neurosensory - Adult  Goal: Achieves maximal functionality and self care  5/18/2025 2248 by Karina Shafer RN  Outcome: Progressing  Flowsheets (Taken 5/18/2025 1935)  Achieves maximal functionality and self 
  Problem: Safety - Adult  Goal: Free from fall injury  5/19/2025 0917 by Fernanda Adams RN  Outcome: Progressing  5/18/2025 2248 by Karina Shafer RN  Outcome: Progressing     Problem: Chronic Conditions and Co-morbidities  Goal: Patient's chronic conditions and co-morbidity symptoms are monitored and maintained or improved  5/19/2025 0917 by Fernanda Adams RN  Outcome: Progressing  5/18/2025 2248 by Karina Shafer RN  Outcome: Progressing  Flowsheets (Taken 5/18/2025 1935)  Care Plan - Patient's Chronic Conditions and Co-Morbidity Symptoms are Monitored and Maintained or Improved: Monitor and assess patient's chronic conditions and comorbid symptoms for stability, deterioration, or improvement     Problem: Discharge Planning  Goal: Discharge to home or other facility with appropriate resources  5/19/2025 0917 by Fernanda Adams RN  Outcome: Progressing  5/18/2025 2248 by Karina Shafer RN  Outcome: Progressing  Flowsheets (Taken 5/18/2025 1935)  Discharge to home or other facility with appropriate resources: Identify barriers to discharge with patient and caregiver     Problem: Skin/Tissue Integrity  Goal: Skin integrity remains intact  Description: 1.  Monitor for areas of redness and/or skin breakdown2.  Assess vascular access sites hourly3.  Every 4-6 hours minimum:  Change oxygen saturation probe site4.  Every 4-6 hours:  If on nasal continuous positive airway pressure, respiratory therapy assess nares and determine need for appliance change or resting period  5/19/2025 0917 by Fernanda Adams RN  Outcome: Progressing  5/18/2025 2248 by Karina Shafer RN  Outcome: Progressing  Flowsheets (Taken 5/18/2025 1935)  Skin Integrity Remains Intact: Monitor for areas of redness and/or skin breakdown     Problem: ABCDS Injury Assessment  Goal: Absence of physical injury  5/19/2025 0917 by Fernanda Adams RN  Outcome: Progressing  5/18/2025 2248 by Karina Shafer RN  Outcome: Progressing     Problem: Neurosensory 
  Problem: Safety - Adult  Goal: Free from fall injury  5/20/2025 0305 by Thi Minor RN  Outcome: Progressing  5/20/2025 0120 by Thi Minor RN  Outcome: Progressing     Problem: Chronic Conditions and Co-morbidities  Goal: Patient's chronic conditions and co-morbidity symptoms are monitored and maintained or improved  5/20/2025 0305 by Thi Minor RN  Outcome: Progressing  5/20/2025 0120 by Thi Minor RN  Outcome: Progressing  Flowsheets (Taken 5/19/2025 2000)  Care Plan - Patient's Chronic Conditions and Co-Morbidity Symptoms are Monitored and Maintained or Improved: Monitor and assess patient's chronic conditions and comorbid symptoms for stability, deterioration, or improvement     Problem: Discharge Planning  Goal: Discharge to home or other facility with appropriate resources  5/20/2025 0305 by Thi Minor RN  Outcome: Progressing  5/20/2025 0120 by Thi Minor RN  Outcome: Progressing  Flowsheets (Taken 5/19/2025 2000)  Discharge to home or other facility with appropriate resources: Identify barriers to discharge with patient and caregiver     Problem: Skin/Tissue Integrity  Goal: Skin integrity remains intact  Description: 1.  Monitor for areas of redness and/or skin breakdown2.  Assess vascular access sites hourly3.  Every 4-6 hours minimum:  Change oxygen saturation probe site4.  Every 4-6 hours:  If on nasal continuous positive airway pressure, respiratory therapy assess nares and determine need for appliance change or resting period  5/20/2025 0305 by Thi Minor RN  Outcome: Progressing  5/20/2025 0120 by Thi Minor RN  Outcome: Progressing  Flowsheets (Taken 5/19/2025 2000)  Skin Integrity Remains Intact: Monitor for areas of redness and/or skin breakdown     Problem: ABCDS Injury Assessment  Goal: Absence of physical injury  5/20/2025 0305 by Thi Minor RN  Outcome: Progressing  5/20/2025 0120 by Thi Minor RN  Outcome: Progressing     Problem: Neurosensory 
  Problem: Safety - Adult  Goal: Free from fall injury  5/20/2025 0528 by Thi Minor RN  Outcome: Progressing  5/20/2025 0305 by Thi Minor RN  Outcome: Progressing  5/20/2025 0120 by Thi Minor RN  Outcome: Progressing     Problem: Chronic Conditions and Co-morbidities  Goal: Patient's chronic conditions and co-morbidity symptoms are monitored and maintained or improved  5/20/2025 0528 by Thi Minor RN  Outcome: Progressing  5/20/2025 0305 by Thi Minor RN  Outcome: Progressing  5/20/2025 0120 by Thi Minor RN  Outcome: Progressing  Flowsheets (Taken 5/19/2025 2000)  Care Plan - Patient's Chronic Conditions and Co-Morbidity Symptoms are Monitored and Maintained or Improved: Monitor and assess patient's chronic conditions and comorbid symptoms for stability, deterioration, or improvement     Problem: Discharge Planning  Goal: Discharge to home or other facility with appropriate resources  5/20/2025 0528 by Thi Minor RN  Outcome: Progressing  5/20/2025 0305 by Thi Minor RN  Outcome: Progressing  5/20/2025 0120 by Thi Minor RN  Outcome: Progressing  Flowsheets (Taken 5/19/2025 2000)  Discharge to home or other facility with appropriate resources: Identify barriers to discharge with patient and caregiver     Problem: Skin/Tissue Integrity  Goal: Skin integrity remains intact  Description: 1.  Monitor for areas of redness and/or skin breakdown2.  Assess vascular access sites hourly3.  Every 4-6 hours minimum:  Change oxygen saturation probe site4.  Every 4-6 hours:  If on nasal continuous positive airway pressure, respiratory therapy assess nares and determine need for appliance change or resting period  5/20/2025 0528 by Thi Minor RN  Outcome: Progressing  5/20/2025 0305 by Thi Minor RN  Outcome: Progressing  5/20/2025 0120 by Thi Minor RN  Outcome: Progressing  Flowsheets (Taken 5/19/2025 2000)  Skin Integrity Remains Intact: Monitor for areas of 
  Problem: Safety - Adult  Goal: Free from fall injury  5/21/2025 0938 by Nunu Chong LPN  Outcome: Progressing  5/20/2025 2302 by Tasha Herrera RN  Outcome: Progressing     
  Problem: Safety - Adult  Goal: Free from fall injury  5/21/2025 2112 by Rico Pena RN  Outcome: Progressing  5/21/2025 0938 by Nunu Chong LPN  Outcome: Progressing     Problem: Chronic Conditions and Co-morbidities  Goal: Patient's chronic conditions and co-morbidity symptoms are monitored and maintained or improved  5/21/2025 2112 by Rico Pena RN  Outcome: Progressing  5/21/2025 0938 by Nunu Chong LPN  Outcome: Progressing     Problem: Discharge Planning  Goal: Discharge to home or other facility with appropriate resources  5/21/2025 2112 by Rico Pena RN  Outcome: Progressing  5/21/2025 0938 by Nunu Chong LPN  Outcome: Progressing     Problem: Skin/Tissue Integrity  Goal: Skin integrity remains intact  Description: 1.  Monitor for areas of redness and/or skin breakdown2.  Assess vascular access sites hourly3.  Every 4-6 hours minimum:  Change oxygen saturation probe site4.  Every 4-6 hours:  If on nasal continuous positive airway pressure, respiratory therapy assess nares and determine need for appliance change or resting period  5/21/2025 2112 by Rico Pena RN  Outcome: Progressing  5/21/2025 0938 by Nunu Chong LPN  Outcome: Progressing     Problem: ABCDS Injury Assessment  Goal: Absence of physical injury  5/21/2025 2112 by Rico Pena RN  Outcome: Progressing  5/21/2025 0938 by Nunu Chong LPN  Outcome: Progressing     Problem: Neurosensory - Adult  Goal: Achieves maximal functionality and self care  5/21/2025 2112 by Rico Pena RN  Outcome: Progressing  5/21/2025 0938 by Nunu Chong LPN  Outcome: Progressing     Problem: Pain  Goal: Verbalizes/displays adequate comfort level or baseline comfort level  5/21/2025 2112 by Rico Pena RN  Outcome: Progressing  5/21/2025 0938 by Nunu Chong LPN  Outcome: Progressing     Problem: Nutrition Deficit:  Goal: Optimize nutritional status  5/21/2025 
  Problem: Safety - Adult  Goal: Free from fall injury  5/22/2025 0813 by Nunu Chong LPN  Outcome: Progressing  5/21/2025 2112 by Rico Pena RN  Outcome: Progressing     
  Problem: Safety - Adult  Goal: Free from fall injury  5/26/2025 1454 by Pita Sainz RN  Outcome: Completed  5/26/2025 1453 by Pita Sainz RN  Outcome: Progressing     Problem: Chronic Conditions and Co-morbidities  Goal: Patient's chronic conditions and co-morbidity symptoms are monitored and maintained or improved  5/26/2025 1454 by Pita Sainz RN  Outcome: Completed  5/26/2025 1453 by Pita Sainz RN  Outcome: Progressing  Flowsheets (Taken 5/26/2025 0800)  Care Plan - Patient's Chronic Conditions and Co-Morbidity Symptoms are Monitored and Maintained or Improved:   Monitor and assess patient's chronic conditions and comorbid symptoms for stability, deterioration, or improvement   Collaborate with multidisciplinary team to address chronic and comorbid conditions and prevent exacerbation or deterioration   Update acute care plan with appropriate goals if chronic or comorbid symptoms are exacerbated and prevent overall improvement and discharge     Problem: Discharge Planning  Goal: Discharge to home or other facility with appropriate resources  5/26/2025 1454 by Pita Sainz RN  Outcome: Completed  5/26/2025 1453 by Pita Sainz RN  Outcome: Progressing  Flowsheets (Taken 5/26/2025 0800)  Discharge to home or other facility with appropriate resources:   Identify barriers to discharge with patient and caregiver   Arrange for needed discharge resources and transportation as appropriate   Identify discharge learning needs (meds, wound care, etc)   Refer to discharge planning if patient needs post-hospital services based on physician order or complex needs related to functional status, cognitive ability or social support system   Arrange for interpreters to assist at discharge as needed     Problem: Skin/Tissue Integrity  Goal: Skin integrity remains intact  Description: 1.  Monitor for areas of redness and/or skin breakdown2.  Assess vascular access sites hourly3.  Every 4-6 hours minimum:  
  Problem: Safety - Adult  Goal: Free from fall injury  Outcome: Progressing     Problem: Chronic Conditions and Co-morbidities  Goal: Patient's chronic conditions and co-morbidity symptoms are monitored and maintained or improved  Outcome: Progressing     Problem: Discharge Planning  Goal: Discharge to home or other facility with appropriate resources  Outcome: Progressing     Problem: Skin/Tissue Integrity  Goal: Skin integrity remains intact  Description: 1.  Monitor for areas of redness and/or skin breakdown2.  Assess vascular access sites hourly3.  Every 4-6 hours minimum:  Change oxygen saturation probe site4.  Every 4-6 hours:  If on nasal continuous positive airway pressure, respiratory therapy assess nares and determine need for appliance change or resting period  Outcome: Progressing     Problem: ABCDS Injury Assessment  Goal: Absence of physical injury  Outcome: Progressing     Problem: Neurosensory - Adult  Goal: Achieves maximal functionality and self care  Outcome: Progressing     
  Problem: Safety - Adult  Goal: Free from fall injury  Outcome: Progressing     Problem: Chronic Conditions and Co-morbidities  Goal: Patient's chronic conditions and co-morbidity symptoms are monitored and maintained or improved  Outcome: Progressing     Problem: Discharge Planning  Goal: Discharge to home or other facility with appropriate resources  Outcome: Progressing     Problem: Skin/Tissue Integrity  Goal: Skin integrity remains intact  Description: 1.  Monitor for areas of redness and/or skin breakdown2.  Assess vascular access sites hourly3.  Every 4-6 hours minimum:  Change oxygen saturation probe site4.  Every 4-6 hours:  If on nasal continuous positive airway pressure, respiratory therapy assess nares and determine need for appliance change or resting period  Outcome: Progressing     Problem: ABCDS Injury Assessment  Goal: Absence of physical injury  Outcome: Progressing     Problem: Neurosensory - Adult  Goal: Achieves maximal functionality and self care  Outcome: Progressing     Problem: Pain  Goal: Verbalizes/displays adequate comfort level or baseline comfort level  Outcome: Progressing     Problem: Nutrition Deficit:  Goal: Optimize nutritional status  Outcome: Progressing     
  Problem: Safety - Adult  Goal: Free from fall injury  Outcome: Progressing     Problem: Chronic Conditions and Co-morbidities  Goal: Patient's chronic conditions and co-morbidity symptoms are monitored and maintained or improved  Outcome: Progressing     Problem: Discharge Planning  Goal: Discharge to home or other facility with appropriate resources  Outcome: Progressing  Flowsheets (Taken 5/20/2025 1838 by Nunu Chong LPN)  Discharge to home or other facility with appropriate resources: Identify barriers to discharge with patient and caregiver     Problem: Skin/Tissue Integrity  Goal: Skin integrity remains intact  Description: 1.  Monitor for areas of redness and/or skin breakdown2.  Assess vascular access sites hourly3.  Every 4-6 hours minimum:  Change oxygen saturation probe site4.  Every 4-6 hours:  If on nasal continuous positive airway pressure, respiratory therapy assess nares and determine need for appliance change or resting period  Outcome: Progressing     Problem: ABCDS Injury Assessment  Goal: Absence of physical injury  Outcome: Progressing     Problem: Neurosensory - Adult  Goal: Achieves maximal functionality and self care  Outcome: Progressing     Problem: Pain  Goal: Verbalizes/displays adequate comfort level or baseline comfort level  Outcome: Progressing     Problem: Nutrition Deficit:  Goal: Optimize nutritional status  Outcome: Progressing  Flowsheets (Taken 5/20/2025 1015 by Michaela Ribeiro, ALL, LD)  Nutrient intake appropriate for improving, restoring, or maintaining nutritional needs:   Assess nutritional status and recommend course of action   Monitor oral intake, labs, and treatment plans     
  Problem: Safety - Adult  Goal: Free from fall injury  Outcome: Progressing     Problem: Chronic Conditions and Co-morbidities  Goal: Patient's chronic conditions and co-morbidity symptoms are monitored and maintained or improved  Outcome: Progressing  Flowsheets (Taken 5/19/2025 2000)  Care Plan - Patient's Chronic Conditions and Co-Morbidity Symptoms are Monitored and Maintained or Improved: Monitor and assess patient's chronic conditions and comorbid symptoms for stability, deterioration, or improvement     Problem: Discharge Planning  Goal: Discharge to home or other facility with appropriate resources  Outcome: Progressing  Flowsheets (Taken 5/19/2025 2000)  Discharge to home or other facility with appropriate resources: Identify barriers to discharge with patient and caregiver     Problem: Skin/Tissue Integrity  Goal: Skin integrity remains intact  Description: 1.  Monitor for areas of redness and/or skin breakdown2.  Assess vascular access sites hourly3.  Every 4-6 hours minimum:  Change oxygen saturation probe site4.  Every 4-6 hours:  If on nasal continuous positive airway pressure, respiratory therapy assess nares and determine need for appliance change or resting period  Outcome: Progressing  Flowsheets (Taken 5/19/2025 2000)  Skin Integrity Remains Intact: Monitor for areas of redness and/or skin breakdown     Problem: ABCDS Injury Assessment  Goal: Absence of physical injury  Outcome: Progressing     Problem: Neurosensory - Adult  Goal: Achieves maximal functionality and self care  Outcome: Progressing  Flowsheets (Taken 5/20/2025 0000)  Achieves maximal functionality and self care: Encourage visually impaired, hearing impaired and aphasic patients to use assistive/communication devices     
  Problem: Safety - Adult  Goal: Free from fall injury  Outcome: Progressing     Problem: Discharge Planning  Goal: Discharge to home or other facility with appropriate resources  Outcome: Progressing  Flowsheets (Taken 5/22/2025 2150)  Discharge to home or other facility with appropriate resources: Identify barriers to discharge with patient and caregiver     Problem: Skin/Tissue Integrity  Goal: Skin integrity remains intact  Description: 1.  Monitor for areas of redness and/or skin breakdown2.  Assess vascular access sites hourly3.  Every 4-6 hours minimum:  Change oxygen saturation probe site4.  Every 4-6 hours:  If on nasal continuous positive airway pressure, respiratory therapy assess nares and determine need for appliance change or resting period  Outcome: Progressing  Flowsheets (Taken 5/22/2025 2150)  Skin Integrity Remains Intact: Monitor for areas of redness and/or skin breakdown     Problem: Pain  Goal: Verbalizes/displays adequate comfort level or baseline comfort level  Outcome: Progressing     Problem: Nutrition Deficit:  Goal: Optimize nutritional status  Outcome: Progressing     
Monitor for areas of redness and/or skin breakdown   Assess vascular access sites hourly   Every 4-6 hours minimum:  Change oxygen saturation probe site   Every 4-6 hours:  If on nasal continuous positive airway pressure, assess nares and determine need for appliance change or resting period   Turn and reposition as indicated   Assess need for specialty bed   Positioning devices   Pressure redistribution bed/mattress (bed type)   Check visual cues for pain   Monitor skin under medical devices     Problem: ABCDS Injury Assessment  Goal: Absence of physical injury  Outcome: Progressing     Problem: Neurosensory - Adult  Goal: Achieves maximal functionality and self care  Outcome: Progressing  Flowsheets (Taken 5/26/2025 0800)  Achieves maximal functionality and self care:   Monitor swallowing and airway patency with patient fatigue and changes in neurological status   Encourage and assist patient to increase activity and self care with guidance from physical therapy/occupational therapy   Encourage visually impaired, hearing impaired and aphasic patients to use assistive/communication devices     Problem: Pain  Goal: Verbalizes/displays adequate comfort level or baseline comfort level  Outcome: Progressing     Problem: Nutrition Deficit:  Goal: Optimize nutritional status  Outcome: Progressing

## 2025-05-28 NOTE — PROGRESS NOTES
[address] [address]   Progress Note  [facility] [address]      Date: 2025   Patient: Greg Easton   : 1940   DOA: 5/15/2025   MRN: [id number]   ROOM#: 3124/3124-A     Admit Date: 5/15/2025     Collaborating Urologist on Call at time of admission: Dr. WHITE   CC: AMS   Reason for Consult: Suprapubic catheter exchange, complicated UTI     Subjective:     Pain: mild, no nausea and no vomiting,   Bowel Movement/Flatus: Yes  Voidinfr SPT with cloudy yellow urine    Pt resting in bed, reports feeling tired but slightly better today. Discussed plan for cystoscopy, left ureteral stent exchange today with associated risks/benefits and he verbalizes understanding and is agreeable to proceed.    Objective:    Vitals:   BP (!) 161/67   Pulse 67   Temp 98.6 °F (37 °C) (Oral)   Resp 16   Ht 1.727 m (5' 8\")   Wt 77 kg (169 lb 12.1 oz)   SpO2 100%   BMI 25.81 kg/m²   Temp  Av °F (36.7 °C)  Min: 97.4 °F (36.3 °C)  Max: 98.6 °F (37 °C)    Intake/Output Summary (Last 24 hours) at 2025 0953  Last data filed at 2025 0634  Gross per 24 hour   Intake 2047.13 ml   Output 900 ml   Net 1147.13 ml     Physical Exam:  Gen: Pleasant male, in NAD. Fatigued.  Neuro: Non-focal  Resp: Unlabored breathing  Abd: Soft, non-distended, non-tender to palpation, no rebound  Back:   No CVAT  :      18fr SPT with cloudy yellow urine  Ext: No edema of bilateral LEs    Labs:  WBC:    Lab Results   Component Value Date/Time    WBC 7.2 2025 05:14 AM     Hemoglobin/Hematocrit:    Lab Results   Component Value Date/Time    HGB 7.2 2025 05:14 AM    HCT 24.7 2025 05:14 AM     BMP:   Lab Results   Component Value Date/Time     2025 05:14 AM    K 5.5 2025 05:14 AM    K 3.9 2018 04:42 AM     2025 05:14 AM    CO2 16 2025 05:14 AM    BUN 60 2025 05:14 AM    CREATININE 2.8 2025 05:14 AM    CALCIUM 8.8 2025 05:14 AM    GFRAA 31 
    [facility] [address]   Progress Note  [facility] [address]      Date: 2025   Patient: Greg Easton   : 1940   DOA: 5/15/2025   MRN: [id number]   ROOM#: 3124/3124-A     Admit Date: 5/15/2025     Collaborating Urologist on Call at time of admission: Dr. WHITE   CC: AMS   Reason for Consult: Suprapubic catheter exchange, complicated UTI   S/p cystoscopy, left ureteral stent exchange 25    Subjective:     Pain: mild, no nausea and no vomiting,   Bowel Movement/Flatus: Yes  Voidinfr SPT with cloudy yellow urine    Pt resting in bed, reports feeling tired but better today.    Objective:    Vitals:   BP (!) 172/68   Pulse 61   Temp 97.7 °F (36.5 °C) (Oral)   Resp 11   Ht 1.727 m (5' 8\")   Wt 81.8 kg (180 lb 5.4 oz)   SpO2 99%   BMI 27.42 kg/m²   Temp  Av.6 °F (36.4 °C)  Min: 96.8 °F (36 °C)  Max: 98.6 °F (37 °C)    Intake/Output Summary (Last 24 hours) at 2025 0749  Last data filed at 2025 0357  Gross per 24 hour   Intake 1708.24 ml   Output 1700 ml   Net 8.24 ml     Physical Exam:  Gen: Pleasant male, in NAD. Fatigued.  Neuro: Non-focal  Resp: Unlabored breathing  Abd: Soft, non-distended, non-tender to palpation, no rebound  Back:   No CVAT  :      18fr SPT with cloudy yellow urine  Ext: No edema of bilateral LEs    Labs:  WBC:    Lab Results   Component Value Date/Time    WBC 5.6 2025 04:05 AM     Hemoglobin/Hematocrit:    Lab Results   Component Value Date/Time    HGB 8.0 2025 04:05 AM    HCT 27.2 2025 04:05 AM     BMP:   Lab Results   Component Value Date/Time     2025 04:05 AM    K 6.0 2025 04:05 AM    K 3.9 2018 04:42 AM     2025 04:05 AM    CO2 17 2025 04:05 AM    BUN 54 2025 04:05 AM    CREATININE 2.5 2025 04:05 AM    CALCIUM 8.8 2025 04:05 AM    GFRAA 31 10/13/2022 06:30 AM    LABGLOM 24 2025 04:05 AM    LABGLOM 25 2024 09:46 AM    LABGLOM 30 2023 10:12 AM 
    V2.0    [facility] Progress Note      Name:  Greg Easton /Age/Sex: 1940  (84 y.o. male)   MRN & CSN:  [id number] & [id number] Encounter Date/Time: 2025 10:03 PM EDT   Location:  [facility] PCP: Jacobo Zazueta MD     Attending:Rachael Moore MD       Hospital Day: 11    Assessment and Recommendations   Greg Easton is a 84 y.o. male recurrent complicated UTI, chronic suprapubic catheter, dementia, left ankle wound, hypothyroidism, hyperlipidemia comes in due to generalised weakness     Complicated UTI due to ESBL E. coli  - UA with numerous white cells  - Urine culture with over 50,000 CFU per mL mixed skin/urogenital jean and over 100,000 colonies of E. Coli  - Meropenem started  plan for 10 days of antibiotic course  EOT       Chronic left hydronephrosis  -Managed with indwelling stent  - Stent was last exchanged in 2020 for  - Underwent cystoscopy and left ureteral stent exchange Monday, 2025    Chronic urinary retention status post chronic SPT last exchanged 2025  - Nursing to manually irrigate the bladder every shift and as needed for debris per urology  - Continue monthly SPT changes per urology    History of prostate cancer status post RPP with Dr. Jose in   - PSA was 0.71 in 2024    Dementia  - Supportive care    Left chronic ankle wound-wound care team consult appreciated    Chronic kidney disease    Hypertension,  - resume home medications    Hypothyroidism-levothyroxine    Hyperlipidemia-atorvastatin      Diet ADULT DIET; Regular  ADULT ORAL NUTRITION SUPPLEMENT; Breakfast, Dinner; Wound Healing Oral Supplement  ADULT ORAL NUTRITION SUPPLEMENT; Lunch; Renal Oral Supplement   DVT Prophylaxis [x] Lovenox, []  Heparin, [] SCDs, [] Ambulation,  [] Eliquis, [] Xarelto  [] Coumadin   Code Status DNR-CCA   Disposition From: Home  Expected Disposition: Home with home health  Estimated Date of Discharge 1 day  Patient requires continued admission due to above 
    V2.0    [facility] Progress Note      Name:  Greg Easton /Age/Sex: 1940  (84 y.o. male)   MRN & CSN:  [id number] & [id number] Encounter Date/Time: 2025 10:03 PM EDT   Location:  [facility] PCP: Jacobo Zazueta MD     Attending:Rachael Moore MD       Hospital Day: 8    Assessment and Recommendations   Greg Easton is a 84 y.o. male recurrent complicated UTI, chronic suprapubic catheter, dementia, left ankle wound, hypothyroidism, hyperlipidemia comes in due to generalised weakness     Complicated UTI due to ESBL E. coli  - UA with numerous white cells  - Urine culture with over 50,000 CFU per mL mixed skin/urogenital jean and over 100,000 colonies of E. Coli  - Meropenem started  plan for 10 to 14 days of antibiotic course       Chronic left hydronephrosis  -Managed with indwelling stent  - Stent was last exchanged in 2020 for  - Underwent cystoscopy and left ureteral stent exchange Monday, 2025    Chronic urinary retention status post chronic SPT last exchanged 2025  - Nursing to manually irrigate the bladder every shift and as needed for debris per urology  - Continue monthly SPT changes per urology    History of prostate cancer status post RPP with Dr. Jose in   - PSA was 0.71 in 2024    Dementia  - Supportive care    Left chronic ankle wound-wound care team consult appreciated    Chronic kidney disease    Hypertension,  - resume home medications    Hypothyroidism-levothyroxine    Hyperlipidemia-atorvastatin      Diet ADULT DIET; Regular  ADULT ORAL NUTRITION SUPPLEMENT; Breakfast, Dinner; Wound Healing Oral Supplement  ADULT ORAL NUTRITION SUPPLEMENT; Lunch; Renal Oral Supplement   DVT Prophylaxis [x] Lovenox, []  Heparin, [] SCDs, [] Ambulation,  [] Eliquis, [] Xarelto  [] Coumadin   Code Status DNR-CCA   Disposition From: Home  Expected Disposition: Possibly SNF  Estimated Date of Discharge: When rehab bed ready  Patient requires continued admission due to 
    V2.0    [facility] Progress Note      Name:  Greg Easton /Age/Sex: 1940  (84 y.o. male)   MRN & CSN:  [id number] & [id number] Encounter Date/Time: 2025 10:03 PM EDT   [facility] PCP: Jacobo Zazueta MD     Attending:Reinier Valentin MD       Hospital Day: 3    Assessment and Recommendations   Greg Easton is a 84 y.o. male     May 17  - Urine culture with ESBL E. coli-will start meropenem    Complicated UTI  - UA with numerous white cells  - Urine culture with over 50,000 CFU per mL mixed skin/urogenital jean and over 100,000 colonies of E. coli    Lethargy noted -Reglan held-May need to change clonidine to something else  - Lethargy improved     Acute metabolic encephalopathy-related to above  - Presented with slow speech, less conversational, more fatigued than baseline    Chronic left hydronephrosis  -Managed with indwelling stent  - Stent was last exchanged in 2020 for  - Urology plan cystoscopy and left ureteral stent exchange Monday, 2025, consult appreciated    Chronic urinary retention status post chronic SPT last exchanged 2025  - Nursing to manually irrigate the bladder every shift and as needed for debris per urology  - Continue monthly SPT changes per urology    History of prostate cancer status post RPP with Dr. Jose in   - PSA was 0.71 in 2024    Dementia  - Supportive care    Left chronic ankle wound-wound care team consult appreciated    Chronic kidney disease  - Creatinine clearance is 18 on May 16-monitor BMP    Hypertension, accelerated, noted upon admission  - He had missed his morning medications  - Home medications per chart are listed below  - Amlodipine  - Clonidine  - Hydralazine  - Metoprolol    Hypothyroidism-levothyroxine    Hyperlipidemia-atorvastatin      Diet ADULT DIET; Regular  ADULT ORAL NUTRITION SUPPLEMENT; Breakfast, Lunch, Dinner; Diabetic Oral Supplement  Diet NPO   DVT Prophylaxis [x] Lovenox, []  
    V2.0    [facility] Progress Note      Name:  Greg Easton /Age/Sex: 1940  (84 y.o. male)   MRN & CSN:  [id number] & [id number] Encounter Date/Time: 2025 10:03 PM EDT   Location:  [address] [place] [address]-A PCP: Jacobo Zazueta MD     Attending:Reinier Valentin MD       Hospital Day: 4    Assessment and Recommendations   Greg Easton is a 84 y.o. male     May 18  - Spoke with his son Bennie in the room and discussed that patient will need 14 days of an IV antibiotic for an E. coli ESBL infection.  Patient participated in the conversation.  - Discussed with case management, input appreciated      Complicated UTI due to ESBL E. coli  - UA with numerous white cells  - Urine culture with over 50,000 CFU per mL mixed skin/urogenital jean and over 100,000 colonies of E. Coli  - Meropenem started     Lethargy noted -Reglan held-May need to change clonidine to something else  - Lethargy improved     Acute metabolic encephalopathy-related to above  - Presented with slow speech, less conversational, more fatigued than baseline    Chronic left hydronephrosis  -Managed with indwelling stent  - Stent was last exchanged in 2020 for  - Urology plan cystoscopy and left ureteral stent exchange Monday, 2025, consult appreciated    Chronic urinary retention status post chronic SPT last exchanged 2025  - Nursing to manually irrigate the bladder every shift and as needed for debris per urology  - Continue monthly SPT changes per urology    History of prostate cancer status post RPP with Dr. Jose in   - PSA was 0.71 in 2024    Dementia  - Supportive care    Left chronic ankle wound-wound care team consult appreciated    Chronic kidney disease  - Creatinine clearance is 18 on May 16-monitor BMP    Hypertension, accelerated, noted upon admission  - He had missed his morning medications  - Home medications per chart are listed below  - Amlodipine  - Clonidine  - Hydralazine  - 
    V2.0    [facility] [place]      Name:  Greg Easton /Age/Sex: 1940  (84 y.o. male)   MRN & CSN:  [id number] & [id number] Encounter Date/Time: 2025 10:03 PM EDT   Location:  [facility]-A PCP: Jacobo Zazueta MD     Attending:Rachael Moore MD       Hospital Day: 9    Assessment and Recommendations   Greg Easton is a 84 y.o. male recurrent complicated UTI, chronic suprapubic catheter, dementia, left ankle wound, hypothyroidism, hyperlipidemia comes in due to generalised weakness     Complicated UTI due to ESBL E. coli  - UA with numerous white cells  - Urine culture with over 50,000 CFU per mL mixed skin/urogenital jean and over 100,000 colonies of E. Coli  - Meropenem started  plan for 10 days of antibiotic course  EOT       Chronic left hydronephrosis  -Managed with indwelling stent  - Stent was last exchanged in 2020 for  - Underwent cystoscopy and left ureteral stent exchange Monday, 2025    Chronic urinary retention status post chronic SPT last exchanged 2025  - Nursing to manually irrigate the bladder every shift and as needed for debris per urology  - Continue monthly SPT changes per urology    History of prostate cancer status post RPP with Dr. Jose in   - PSA was 0.71 in 2024    Dementia  - Supportive care    Left chronic ankle wound-wound care team consult appreciated    Chronic kidney disease    Hypertension,  - resume home medications    Hypothyroidism-levothyroxine    Hyperlipidemia-atorvastatin      Diet ADULT DIET; Regular  ADULT ORAL NUTRITION SUPPLEMENT; Breakfast, Dinner; Wound Healing Oral Supplement  ADULT ORAL NUTRITION SUPPLEMENT; Lunch; Renal Oral Supplement   DVT Prophylaxis [x] Lovenox, []  Heparin, [] SCDs, [] Ambulation,  [] Eliquis, [] Xarelto  [] Coumadin   Code Status DNR-CCA   Disposition From: Home  Expected Disposition: Possibly SNF  Estimated Date of Discharge: When rehab bed ready  Patient requires continued admission due to 
    V2.0    [facility] [place]      Name:  Greg Easton /Age/Sex: 1940  (84 y.o. male)   MRN & CSN:  [id number] & [id number] Encounter Date/Time: 2025 10:03 PM EDT   Location:  [facility] PCP: Jacobo Zazueta MD     Attending:Rachael Moore MD       Hospital Day: 7    Assessment and Recommendations   Greg Easton is a 84 y.o. male recurrent complicated UTI, chronic suprapubic catheter, dementia, left ankle wound, hypothyroidism, hyperlipidemia comes in due to generalised weakness     Complicated UTI due to ESBL E. coli  - UA with numerous white cells  - Urine culture with over 50,000 CFU per mL mixed skin/urogenital jean and over 100,000 colonies of E. Coli  - Meropenem started  plan for 10 to 14 days of antibiotic course       Chronic left hydronephrosis  -Managed with indwelling stent  - Stent was last exchanged in 2020 for  - Underwent cystoscopy and left ureteral stent exchange Monday, 2025    Chronic urinary retention status post chronic SPT last exchanged 2025  - Nursing to manually irrigate the bladder every shift and as needed for debris per urology  - Continue monthly SPT changes per urology    History of prostate cancer status post RPP with Dr. Jose in   - PSA was 0.71 in 2024    Dementia  - Supportive care    Left chronic ankle wound-wound care team consult appreciated    Chronic kidney disease    Hypertension, accelerated, noted upon admission  - He had missed his morning medications  - Home medications per chart are listed below  - Amlodipine  - Clonidine  - Hydralazine  - Metoprolol    Hypothyroidism-levothyroxine    Hyperlipidemia-atorvastatin      Diet ADULT DIET; Regular  ADULT ORAL NUTRITION SUPPLEMENT; Breakfast, Dinner; Wound Healing Oral Supplement  ADULT ORAL NUTRITION SUPPLEMENT; Lunch; Renal Oral Supplement   DVT Prophylaxis [x] Lovenox, []  Heparin, [] SCDs, [] Ambulation,  [] Eliquis, [] Xarelto  [] Coumadin   Code Status DNR-CCA 
    V2.0    [facility] Progress Note      Name:  Greg Easton /Age/Sex: 1940  (84 y.o. male)   MRN & CSN:  [id number] & [id number] Encounter Date/Time: 2025 10:03 PM EDT   [facility] PCP: Jacobo Zazueta MD     Attending:Reinier Valentin MD       Hospital Day: 5    Assessment and Recommendations   Greg Easton is a 84 y.o. male     May 19  - The left ureteral stent was exchanged in the OR today-had a left cystoscopy, appreciate urology consult  - Potassium was 5.5 this morning-will consult nephrology in a.m.  - Once cleared by nephrology and urology he will be medically stable for discharge  - Discharge planning is complicated-see case management notes        Complicated UTI due to ESBL E. coli  - UA with numerous white cells  - Urine culture with over 50,000 CFU per mL mixed skin/urogenital jean and over 100,000 colonies of E. Coli  - Meropenem started     Lethargy noted -Reglan held-May need to change clonidine to something else  - Lethargy improved     Acute metabolic encephalopathy-related to above  - Presented with slow speech, less conversational, more fatigued than baseline    Chronic left hydronephrosis  -Managed with indwelling stent  - Stent was last exchanged in 2020 for  - Urology plan cystoscopy and left ureteral stent exchange Monday, 2025, consult appreciated    Chronic urinary retention status post chronic SPT last exchanged 2025  - Nursing to manually irrigate the bladder every shift and as needed for debris per urology  - Continue monthly SPT changes per urology    History of prostate cancer status post RPP with Dr. Jose in   - PSA was 0.71 in 2024    Dementia  - Supportive care    Left chronic ankle wound-wound care team consult appreciated    Chronic kidney disease  - Creatinine clearance is 18 on May 16-monitor BMP    Hypertension, accelerated, noted upon admission  - He had missed his morning medications  - Home 
    V2.0    [facility] Progress Note      Name:  Greg Easton /Age/Sex: 1940  (84 y.o. male)   MRN & CSN:  [id number] & [id number] Encounter Date/Time: 2025 10:03 PM EDT   Location:  [facility] PCP: Jacobo Zazueta MD     Attending:Rachael Moore MD       Hospital Day: 10    Assessment and Recommendations   Greg Easton is a 84 y.o. male recurrent complicated UTI, chronic suprapubic catheter, dementia, left ankle wound, hypothyroidism, hyperlipidemia comes in due to generalised weakness     Complicated UTI due to ESBL E. coli  - UA with numerous white cells  - Urine culture with over 50,000 CFU per mL mixed skin/urogenital jean and over 100,000 colonies of E. Coli  - Meropenem started  plan for 10 days of antibiotic course  EOT       Chronic left hydronephrosis  -Managed with indwelling stent  - Stent was last exchanged in 2020 for  - Underwent cystoscopy and left ureteral stent exchange Monday, 2025    Chronic urinary retention status post chronic SPT last exchanged 2025  - Nursing to manually irrigate the bladder every shift and as needed for debris per urology  - Continue monthly SPT changes per urology    History of prostate cancer status post RPP with Dr. Jose in   - PSA was 0.71 in 2024    Dementia  - Supportive care    Left chronic ankle wound-wound care team consult appreciated    Chronic kidney disease    Hypertension,  - resume home medications    Hypothyroidism-levothyroxine    Hyperlipidemia-atorvastatin      Diet ADULT DIET; Regular  ADULT ORAL NUTRITION SUPPLEMENT; Breakfast, Dinner; Wound Healing Oral Supplement  ADULT ORAL NUTRITION SUPPLEMENT; Lunch; Renal Oral Supplement   DVT Prophylaxis [x] Lovenox, []  Heparin, [] SCDs, [] Ambulation,  [] Eliquis, [] Xarelto  [] Coumadin   Code Status DNR-CCA   Disposition From: Home  Expected Disposition: Home with home health  Estimated Date of Discharge 1 day  Patient requires continued admission due to above 
    V2.0    [facility] [place]      Name:  Greg Easton /Age/Sex: 1940  (84 y.o. male)   MRN & CSN:  [id number] & [id number] Encounter Date/Time: 2025 10:03 PM EDT   [facility]  [facility] PCP: Jacobo Zazueta MD     Attending:Reinier Valentin MD       Hospital Day: 2    Assessment and Recommendations   Greg Easton is a 84 y.o. male     May 16  - Follow-up sensitivity on E. coli    Complicated UTI  - UA with numerous white cells  - Urine culture with over 50,000 CFU per mL mixed skin/urogenital jean and over 100,000 colonies of E. coli  - IV Rocephin    Lethargy noted -Reglan held-May need to change clonidine to something else    Acute metabolic encephalopathy-related to above  - Presented with slow speech, less conversational, more fatigued than baseline    Chronic left hydronephrosis  -Managed with indwelling stent  - Stent was last exchanged in 2020 for  - Urology plan cystoscopy and left ureteral stent exchange Monday, 2025, consult appreciated    Chronic urinary retention status post chronic SPT last exchanged 2025  - Nursing to manually irrigate the bladder every shift and as needed for debris per urology  - Continue monthly SPT changes per urology    History of prostate cancer status post RPP with Dr. Jose in   - PSA was 0.71 in 2024    Dementia  - Supportive care    Left chronic ankle wound-wound care team consult appreciated    Chronic kidney disease  - Creatinine clearance is 18 on May 16-monitor BMP    Hypertension, accelerated, noted upon admission  - He had missed his morning medications  - Home medications per chart are listed below  - Amlodipine  - Clonidine  - Hydralazine  - Metoprolol    Hypothyroidism-levothyroxine    Hyperlipidemia-atorvastatin      Diet ADULT DIET; Regular  ADULT ORAL NUTRITION SUPPLEMENT; Breakfast, Lunch, Dinner; Diabetic Oral Supplement   DVT Prophylaxis [x] Lovenox, []  Heparin, [] SCDs, [] Ambulation,  [] Eliquis, 
   05/22/25 1124   Encounter Summary   Encounter Overview/Reason Spiritual/Emotional Needs   Encounter Code  Assessment by  services   Service Provided For Patient   Referral/Consult From [facility]   Support System Family members   Last Encounter  05/22/25  (Pt expressed his feelings, frustrated that people think he's crazy, we had a good conversation, I did not assess any confusion, he seemed alert and clear today, he shared his story, wants support from his , concerned with falling, he wanted prayer.)   Complexity of Encounter Low   Begin Time 1100   End Time  1125   Total Time Calculated 25 min   Spiritual/Emotional needs   Type Spiritual Support   Assessment/Intervention/Outcome   Assessment Calm;Concerns with suffering;Hopeful;Stress overload   Intervention Active listening;Discussed illness injury and it’s impact;Discussed relationship with God;Prayer (assurance of)/[place];Sustaining Presence/Ministry of presence   Outcome Comfort;Coping;Expressed feelings, needs, and concerns;Expressed Gratitude   Plan and Referrals   Plan/Referrals Continue Support (comment)       
  Physical Therapy Treatment Note  Name: Greg Easton MRN: [id number] :   1940   Date:  2025   Admission Date: 5/15/2025 Room:  [address] [place] [address]   Restrictions/Precautions: general precautions, fall risk, contact isolation   Communication with other providers:  Pt okay to see for therapy per RN   Subjective:  Patient states:  Agreeable to session.   Pain:   Location, Type, Intensity (0/10 to 10/10):  Denies   Objective:    Observation:  Pt supine in bed upon PTA arrival.   Objective Measures:  Tele, stable  Treatment, including education/measures:    Therapeutic Activity Training:   Therapeutic activity training was instructed today.  Cues were given for safety, sequence, UE/LE placement, awareness, and balance. Activities performed today included bed mobility training, sup-sit, sit-stand, SPT.    Mobility:  Sup > sit: Max A x 2 to EOB, CGA for sitting balance.   Scooting: Mod A at hips to scoot.   STS: Max A x 2 from EOB, Max A x 2 from recliner x 2 reps. First stand for recliner performed for ~1-2 min, second stand from recliner 3-4 min.   SPT: Max A x 2. Attempt step-pivot transfer with arm-in-arm assist, pt was able to partially advance L LE but unable to advance R LE, pivot performed.     Balance:  Seated: Fair, pt perform unsupported sitting at EOB with no LOB.   Standing: Poor, pt requires Mod A x 2 to maintain dt retrolean and B knee weakness.     Safety:   Gait belt donned this session. Pt returned safely to recliner with chair alarm activated, call light in reach, all needs met.   Assessment / Impression:    Pt tolerated OOB activities well this date but does present with impaired strength, endurance, and functional mobility. Pt will continue to benefit from skilled therapy to increase functional mobility and independence.   Patient's tolerance of treatment:  Good   Adverse Reaction: none  Significant change in status and impact:  none  Barriers to improvement:  none  Plan for Next Session:  
  Physician Progress Note      PATIENT:               ANA ROMAN  CSN #:                  [id number]  :                       1940  ADMIT DATE:       5/15/2025 9:16 AM  DISCH DATE:        2025 5:45 PM  RESPONDING  PROVIDER #:        Rachael Moore MD          QUERY TEXT:      UTI is documented in the medical record of H&P on 5/15/2025 by Nicolette Carter MD .  Please clarify the relationship, if any, with the UTI and Ureteral stent   :    The clinical indicators include:  This is a 84 y.o. male who presents with Pyonephrosis.  -Age 84yr,Presence of ureteral stent,UTI  -Chronic left hydronephrosis, Managed with indwelling stent- Stent was last   exchanged in 2020 for  - Urology plan cystoscopy and left ureteral stent exchange Monday, 2025,   consult appreciated documented in IM PN on  by Reinier Valentin MD  -Recurrent genitourinary infection with chronic ureteral stent as well as   history of high blood pressure and diabetes mellitus  documented in Nephrology   PN on  by Kade Suarez MD  -Complicated UTI due to ESBL E. coli- UA with numerous white cells- Urine   culture with over 50,000 CFU per mL mixed skin/urogenital jean and over   100,000 colonies of E. Coli- Meropenem started  plan for 10 days of   antibiotic course  EOT  documented  in IM PN on  by  Rachael Moore MD  -ceftriaxone (ROCEPHIN) 2,000 mg, meropenem (MERREM) 500 mg, Cystoscopy with   stent exchange, Urine analysis and culture, Serial labs    Thank you  MARY Mead,CDS  Options provided:  -- UTI related to ureteral stent  -- UTI not related to ureteral stent  -- Other - I will add my own diagnosis  -- Disagree - Not applicable / Not valid  -- Disagree - Clinically unable to determine / Unknown  -- Refer to Clinical Documentation Reviewer    PROVIDER RESPONSE TEXT:    UTI is not related to ureteral stent.    Query created by: Sharonda Torrez on 2025 5:11 
1420 Pt arrived from OR to PACU. Monitors applied and alarms in place. Report rec'd from Johanna QUIÑONES and Harshad QUIROGA. Pt resting comfortably in bed. LR infusing  1430 BG 72   1435 Pt tolerating apple juice well. Pt denies pain or nausea  1445 Pt repositioned in bed. D5LR infusing  1500 Report called to 3N. Pt denies pain or nausea  1515 Attempted to call pt son. No answer at this time  1526 Transport at bedside  
4 Eyes Skin Assessment     NAME:  Greg Easton  YOB: 1940  MEDICAL RECORD NUMBER:  [id number]    The patient is being assessed for  Transfer to New Unit    I agree that at least one RN has performed a thorough Head to Toe Skin Assessment on the patient. ALL assessment sites listed below have been assessed.      Areas assessed by both nurses:    Head, Face, Ears, Shoulders, Back, Chest, Arms, Elbows, Hands, Sacrum. Buttock, Coccyx, Ischium, Legs. Feet and Heels, and Under Medical Devices         Does the Patient have a Wound? Yes wound(s) were present on assessment. LDA wound assessment was Initiated and completed by RN       Judd Prevention initiated by RN: Yes  Wound Care Orders initiated by RN: Yes    Pressure Injury (Stage 3,4, Unstageable, DTI, NWPT, and Complex wounds) if present, place Wound referral order by RN under : No    New Ostomies, if present place, Ostomy referral order under : No     Nurse 1 eSignature: Electronically signed by Tasha Herrera RN on 5/20/25 at 8:36 PM EDT    **SHARE this note so that the co-signing nurse can place an eSignature**    Nurse 2 eSignature: Electronically signed by Rina Harmon LPN on 5/21/25 at 1:01 AM EDT    
4 Eyes Skin Assessment     NAME:  Greg Easton  YOB: 1940  MEDICAL RECORD NUMBER:  [id number]    The patient is being assessed for  Admission    I agree that at least one RN has performed a thorough Head to Toe Skin Assessment on the patient. ALL assessment sites listed below have been assessed.      Areas assessed by both nurses:    Head, Face, Ears, Shoulders, Back, Chest, Arms, Elbows, Hands, Sacrum. Buttock, Coccyx, Ischium, Legs. Feet and Heels, and Under Medical Devices         Does the Patient have a Wound? Yes wound(s) were present on assessment. LDA wound assessment was Initiated and completed by RN       Judd Prevention initiated by RN: Yes  Wound Care Orders initiated by RN: Yes    Pressure Injury (Stage 3,4, Unstageable, DTI, NWPT, and Complex wounds) if present, place Wound referral order by RN under : No    New Ostomies, if present place, Ostomy referral order under : No     Nurse 1 eSignature: {Esignature:[id number]}    **SHARE this note so that the co-signing nurse can place an eSignature**    Nurse 2 eSignature: Electronically signed by Taylor Abreu RN on 5/15/25 at 6:05 PM EDT    
[facility]. Talked to the nurse and they asked if the pt wanted to resume hospice at home, and per case management's notes from yesterday, the answer is yes. Was told to instruct the son to call them when the pt gets home and they will send a nurse out to resume the hospice.   
Comprehensive Nutrition Assessment    Type and Reason for Visit:  Reassess    Nutrition Recommendations/Plan:   Continue current diet, Low K+ Diet/ No Concentrated Sweets Diet available as needed   Begin wound healing/renal oral nutrition supplement trials  Total assist with feeding     Malnutrition Assessment:  Malnutrition Status:  At risk for malnutrition (05/16/25 1436)    Context:  Chronic Illness       Nutrition Assessment:    Pt continues with generally sub optimal po intake, consuming less than half of Regular Diet, total assist with feeding, did consume 100% of today's breakfast. Will restart oral supplements cancelled when Pt made NPO, to optimize healing. Variable wts noted, but overall stable x past yr. Continue to follow as moderate nutrition risk.    Nutrition Related Findings:    lokelma K 6, BUN 54, Cr 2.5, GFR 24, Glu 214, A1c up to 8   Wound Type: Pressure Injury, Unstageable, Deep Tissue Injury (traumatic)       Current Nutrition Intake & Therapies:    Average Meal Intake: 1-25%, 26-50%, %  Average Supplements Intake: None Ordered  ADULT DIET; Regular    Anthropometric Measures:  Height: 172.7 cm (5' 8\")  Ideal Body Weight (IBW): 154 lbs (70 kg)    Admission Body Weight: 86.2 kg (190 lb 0.6 oz)  Current Body Weight: 81.8 kg (180 lb 5.4 oz),   IBW.    Current BMI (kg/m2): 27.4  Usual Body Weight: 81.9 kg (180 lb 9 oz) (5/20/24)     % Weight Change (Calculated): -0.1                         Estimated Daily Nutrient Needs:  Energy Requirements Based On: Kcal/kg  Weight Used for Energy Requirements: Current  Energy (kcal/day): 7162-8702 (35-30 kcal/kg)  Weight Used for Protein Requirements: Current  Protein (g/day):  (1-1.4 g/kg) as renal function allows  Method Used for Fluid Requirements: 1 ml/kcal  Fluid (ml/day): 2150    Nutrition Diagnosis:   Predicted inadequate energy intake related to increase demand for energy/nutrients as evidenced by wounds    Nutrition Interventions:   Food 
Comprehensive Nutrition Assessment    Type and Reason for Visit:  Reassess    Nutrition Recommendations/Plan:   Continue regular diet at this time   Will continue to offer oral nutrition supplements as patient will accept-wound healing and renal  Assist/supervise meals as needed to encourage intake  Will continue to follow up during stay     Malnutrition Assessment:  Malnutrition Status:  At risk for malnutrition (05/16/25 8116)    Context:  Chronic Illness       Nutrition Assessment:    Meal intake % recent meals. Remains on regular diet with oral nutrition supplements wound healign and daily renal supplement. Both supplements low potassium. Improving meal intake this week. Will continue to follow at moderate nutrition risk with increased needs.    Nutrition Related Findings:    receiving care on visit,   s/p cystoscopy with stent exchange      elevated potassium at times -on [facility] dementia   plan discharge to [facility] Wound Type: Pressure Injury, Unstageable, Deep Tissue Injury (traumatic)       Current Nutrition Intake & Therapies:    Average Meal Intake: 51-75%, %  Average Supplements Intake: Unable to assess  ADULT DIET; Regular  ADULT ORAL NUTRITION SUPPLEMENT; Breakfast, Dinner; Wound Healing Oral Supplement  ADULT ORAL NUTRITION SUPPLEMENT; Lunch; Renal Oral Supplement    Anthropometric Measures:  Height: 172.7 cm (5' 8\")  Ideal Body Weight (IBW): 154 lbs (70 kg)    Admission Body Weight: 86.2 kg (190 lb 0.6 oz)  Current Body Weight: 82.5 kg (181 lb 14.1 oz),   IBW. Weight Source: Bed scale  Current BMI (kg/m2): 27.7  Usual Body Weight: 81.9 kg (180 lb 9 oz) (5/20/24)     % Weight Change (Calculated): -0.1                         Estimated Daily Nutrient Needs:  Energy Requirements Based On: Kcal/kg  Weight Used for Energy Requirements: Current  Energy (kcal/day): 1241-8318 (35-30 kcal/kg)  Weight Used for Protein Requirements: Current  Protein (g/day):  (1-1.4 g/kg) as renal function 
Hospitalist    Patient has been lethargic all day - on Yon 10 mg TID, CrCl is 18.  Held Reglan. May need to dc clonidine.   
Nephrology Progress Note  5/21/2025 6:22 AM        Subjective:   Admit Date: 5/15/2025  PCP: Jacobo Zazueta MD    Interval History:     Mr. Easton is much more alert and oriented and awake and talkative this morning    Diet: Reported eating some    ROS: No shortness of breath or confusion, urine output recorded 750 cc for the last 24 hours  No fever acceptable blood pressure    Data:     Current meds:    sodium zirconium cyclosilicate  10 g Oral BID    vitamin D  50,000 Units Oral Weekly    insulin glargine  15 Units SubCUTAneous Daily    insulin lispro  5 Units SubCUTAneous TID WC    insulin lispro  0-8 Units SubCUTAneous 4x Daily AC & HS    meropenem  500 mg IntraVENous Q12H    amLODIPine  10 mg Oral Daily    aspirin  81 mg Oral Nightly    atorvastatin  20 mg Oral Nightly    cloNIDine  0.2 mg Oral BID    ferrous sulfate  325 mg Oral Daily with breakfast    hydrALAZINE  100 mg Oral 3 times per day    levothyroxine  88 mcg Oral Daily    [Held by provider] metoclopramide  10 mg Oral TID    metoprolol tartrate  25 mg Oral BID    pantoprazole  40 mg Oral QAM AC    senna  2 tablet Oral Daily    [Held by provider] traMADol  50 mg Oral Q12H    sodium chloride flush  5-40 mL IntraVENous 2 times per day    enoxaparin  30 mg SubCUTAneous Daily      dextrose      sodium chloride 100 mL/hr at 05/19/25 1324         I/O last 3 completed shifts:  In: 1908.2 [P.O.:560; I.V.:1173.3; Other:80; IV Piggyback:94.9]  Out: 1700 [Urine:1700]    CBC:   Recent Labs     05/19/25  0514 05/20/25  0405   WBC 7.2 5.6   HGB 7.2* 8.0*    220          Recent Labs     05/19/25  0514 05/20/25  0405 05/20/25  1546 05/21/25  0152    138  --  136   K 5.5* 6.0* 5.2* 5.2*    109  --  109   CO2 16* 17*  --  17*   BUN 60* 54*  --  53*   CREATININE 2.8* 2.5*  --  2.6*   GLUCOSE 86 214*  --  138*       Lab Results   Component Value Date    CALCIUM 8.4 05/21/2025    PHOS 4.0 05/21/2025       Objective:     Vitals: BP (!) 124/50   Pulse 
Nephrology Progress Note  5/22/2025 8:00 AM        Subjective:   Admit Date: 5/15/2025  PCP: Jacobo Zazueta MD    Interval History: No major event    Diet: Some not up to the par    ROS: No overt shortness of breath or confusion, urine output recorded only 340 cc for the last 24 hours he does have suprapubic catheter unless it was not recorded accurately.  No fever and acceptable blood pressure    Data:     Current meds:    sodium bicarbonate  650 mg Oral BID    sodium zirconium cyclosilicate  10 g Oral BID    vitamin D  50,000 Units Oral Weekly    insulin glargine  15 Units SubCUTAneous Daily    insulin lispro  5 Units SubCUTAneous TID WC    insulin lispro  0-8 Units SubCUTAneous 4x Daily AC & HS    meropenem  500 mg IntraVENous Q12H    amLODIPine  10 mg Oral Daily    aspirin  81 mg Oral Nightly    atorvastatin  20 mg Oral Nightly    cloNIDine  0.2 mg Oral BID    ferrous sulfate  325 mg Oral Daily with breakfast    hydrALAZINE  100 mg Oral 3 times per day    levothyroxine  88 mcg Oral Daily    [Held by provider] metoclopramide  10 mg Oral TID    metoprolol tartrate  25 mg Oral BID    pantoprazole  40 mg Oral QAM AC    senna  2 tablet Oral Daily    [Held by provider] traMADol  50 mg Oral Q12H    sodium chloride flush  5-40 mL IntraVENous 2 times per day    enoxaparin  30 mg SubCUTAneous Daily      dextrose      sodium chloride 100 mL/hr at 05/19/25 1324         I/O last 3 completed shifts:  In: 713.5 [P.O.:360; Other:40; IV Piggyback:313.5]  Out: 1090 [Urine:1090]    CBC:   Recent Labs     05/20/25  0405   WBC 5.6   HGB 8.0*             Recent Labs     05/20/25  0405 05/20/25  1546 05/21/25  0152 05/22/25  0112     --  136 140   K 6.0* 5.2* 5.2* 5.2*     --  109 111*   CO2 17*  --  17* 18*   BUN 54*  --  53* 54*   CREATININE 2.5*  --  2.6* 2.8*   GLUCOSE 214*  --  138* 81       Lab Results   Component Value Date    CALCIUM 8.4 05/22/2025    PHOS 4.0 05/21/2025       Objective:     Vitals: BP 
Nephrology Progress Note  5/23/2025 7:24 AM        Subjective:   Admit Date: 5/15/2025  PCP: Jacobo Zazueta MD    Interval History: No major event that I am aware of    Diet: Eating some    ROS: No overt shortness of breath or confusion, urine output recorded 1.1 L for the last 24 hours.  No fever and acceptable blood pressure    Data:     Current meds:    sodium bicarbonate  650 mg Oral BID    sodium zirconium cyclosilicate  10 g Oral BID    vitamin D  50,000 Units Oral Weekly    insulin glargine  15 Units SubCUTAneous Daily    insulin lispro  5 Units SubCUTAneous TID WC    insulin lispro  0-8 Units SubCUTAneous 4x Daily AC & HS    meropenem  500 mg IntraVENous Q12H    amLODIPine  10 mg Oral Daily    aspirin  81 mg Oral Nightly    atorvastatin  20 mg Oral Nightly    cloNIDine  0.2 mg Oral BID    ferrous sulfate  325 mg Oral Daily with breakfast    hydrALAZINE  100 mg Oral 3 times per day    levothyroxine  88 mcg Oral Daily    [Held by provider] metoclopramide  10 mg Oral TID    metoprolol tartrate  25 mg Oral BID    pantoprazole  40 mg Oral QAM AC    senna  2 tablet Oral Daily    [Held by provider] traMADol  50 mg Oral Q12H    sodium chloride flush  5-40 mL IntraVENous 2 times per day    enoxaparin  30 mg SubCUTAneous Daily      dextrose      sodium chloride 100 mL/hr at 05/19/25 1324         I/O last 3 completed shifts:  In: 720 [P.O.:720]  Out: 1440 [Urine:1440]    CBC: No results for input(s): \"WBC\", \"HGB\", \"PLT\" in the last 72 hours.       Recent Labs     05/21/25  0152 05/22/25  0112 05/23/25  0116    140 137   K 5.2* 5.2* 5.0    111* 109   CO2 17* 18* 17*   BUN 53* 54* 53*   CREATININE 2.6* 2.8* 2.6*   GLUCOSE 138* 81 176*       Lab Results   Component Value Date    CALCIUM 8.5 05/23/2025    PHOS 4.5 05/23/2025       Objective:     Vitals: BP (!) 147/56   Pulse 67   Temp 98 °F (36.7 °C) (Oral)   Resp 14   Ht 1.727 m (5' 8\")   Wt 82.5 kg (181 lb 14.1 oz)   SpO2 100%   BMI 27.65 kg/m² 
Nephrology Progress Note  5/24/2025 10:33 AM        Subjective:   Admit Date: 5/15/2025  PCP: Jacobo Zazueta MD    Interval History: Had episode of hypoglycemia    Diet: Some    ROS: No overt shortness of further confusion, urine output recorded 1250 cc for the last 24 hours, no fever variable blood pressure recorded    Data:     Current meds:    insulin glargine  10 Units SubCUTAneous Daily    sodium bicarbonate  650 mg Oral BID    vitamin D  50,000 Units Oral Weekly    insulin lispro  0-8 Units SubCUTAneous 4x Daily AC & HS    meropenem  500 mg IntraVENous Q12H    amLODIPine  10 mg Oral Daily    aspirin  81 mg Oral Nightly    atorvastatin  20 mg Oral Nightly    cloNIDine  0.2 mg Oral BID    ferrous sulfate  325 mg Oral Daily with breakfast    hydrALAZINE  100 mg Oral 3 times per day    levothyroxine  88 mcg Oral Daily    [Held by provider] metoclopramide  10 mg Oral TID    metoprolol tartrate  25 mg Oral BID    pantoprazole  40 mg Oral QAM AC    senna  2 tablet Oral Daily    [Held by provider] traMADol  50 mg Oral Q12H    sodium chloride flush  5-40 mL IntraVENous 2 times per day    enoxaparin  30 mg SubCUTAneous Daily      dextrose      sodium chloride 10 mL (05/24/25 0016)         I/O last 3 completed shifts:  In: 1010 [P.O.:990; I.V.:20]  Out: 2350 [Urine:2350]    CBC: No results for input(s): \"WBC\", \"HGB\", \"PLT\" in the last 72 hours.       Recent Labs     05/22/25  0112 05/23/25  0116    137   K 5.2* 5.0   * 109   CO2 18* 17*   BUN 54* 53*   CREATININE 2.8* 2.6*   GLUCOSE 81 176*       Lab Results   Component Value Date    CALCIUM 8.5 05/23/2025    PHOS 4.5 05/23/2025       Objective:     Vitals: BP (!) 170/63   Pulse 64   Temp 97.2 °F (36.2 °C) (Oral)   Resp 14   Ht 1.727 m (5' 8\")   Wt 82.5 kg (181 lb 14.1 oz)   SpO2 100%   BMI 27.65 kg/m² ,    General appearance: Alert awake and oriented this morning  HEENT: 1+ conjunctival pallor no scleral icterus  Neck: Supple with head of the bed 
Nephrology Progress Note  5/25/2025 8:44 AM        Subjective:   Admit Date: 5/15/2025  PCP: Jacobo Zazueta MD    Interval History: No major event, mainly waiting for placement    Diet: Eating little bit better    ROS: More alert awake and oriented, urine output recorded only 600 cc I suspect it was not accurately recorded.  No fever and acceptable blood pressure    Data:     Current meds:    insulin glargine  10 Units SubCUTAneous Daily    sodium bicarbonate  1,300 mg Oral BID    cloNIDine  0.2 mg Oral TID    vitamin D  50,000 Units Oral Weekly    insulin lispro  0-8 Units SubCUTAneous 4x Daily AC & HS    meropenem  500 mg IntraVENous Q12H    amLODIPine  10 mg Oral Daily    aspirin  81 mg Oral Nightly    atorvastatin  20 mg Oral Nightly    ferrous sulfate  325 mg Oral Daily with breakfast    hydrALAZINE  100 mg Oral 3 times per day    levothyroxine  88 mcg Oral Daily    [Held by provider] metoclopramide  10 mg Oral TID    metoprolol tartrate  25 mg Oral BID    pantoprazole  40 mg Oral QAM AC    senna  2 tablet Oral Daily    [Held by provider] traMADol  50 mg Oral Q12H    sodium chloride flush  5-40 mL IntraVENous 2 times per day    enoxaparin  30 mg SubCUTAneous Daily      dextrose      sodium chloride 10 mL (05/24/25 0016)         I/O last 3 completed shifts:  In: 490 [P.O.:480; I.V.:10]  Out: 1300 [Urine:1300]    CBC: No results for input(s): \"WBC\", \"HGB\", \"PLT\" in the last 72 hours.       Recent Labs     05/23/25  0116 05/24/25  1121    139   K 5.0 5.3*    110   CO2 17* 12*   BUN 53* 47*   CREATININE 2.6* 2.4*   GLUCOSE 176* 225*       Lab Results   Component Value Date    CALCIUM 8.4 05/24/2025    PHOS 4.5 05/23/2025       Objective:     Vitals: /63   Pulse 63   Temp 97.7 °F (36.5 °C) (Oral)   Resp 13   Ht 1.727 m (5' 8\")   Wt 82.5 kg (181 lb 14.1 oz)   SpO2 99%   BMI 27.65 kg/m² ,    General appearance: Alert, awake and oriented without any acute distress  HEENT: At least 2+ 
Nephrology Progress Note  5/26/2025 10:03 AM        Subjective:   Admit Date: 5/15/2025  PCP: Jacobo Zazueta MD    Interval History: Still waiting for placement-but he likely will go home today as his last intravenous antimicrobial agent    Diet: Reasonable    ROS: No shortness of breath or confusion, urine output about 625 cc for the last 24 hours, no fever and acceptable blood pressure    Data:     Current meds:    sodium zirconium cyclosilicate  10 g Oral Daily    insulin glargine  10 Units SubCUTAneous Daily    sodium bicarbonate  1,300 mg Oral BID    cloNIDine  0.2 mg Oral TID    vitamin D  50,000 Units Oral Weekly    insulin lispro  0-8 Units SubCUTAneous 4x Daily AC & HS    meropenem  500 mg IntraVENous Q12H    amLODIPine  10 mg Oral Daily    aspirin  81 mg Oral Nightly    atorvastatin  20 mg Oral Nightly    ferrous sulfate  325 mg Oral Daily with breakfast    hydrALAZINE  100 mg Oral 3 times per day    levothyroxine  88 mcg Oral Daily    [Held by provider] metoclopramide  10 mg Oral TID    metoprolol tartrate  25 mg Oral BID    pantoprazole  40 mg Oral QAM AC    senna  2 tablet Oral Daily    [Held by provider] traMADol  50 mg Oral Q12H    sodium chloride flush  5-40 mL IntraVENous 2 times per day    enoxaparin  30 mg SubCUTAneous Daily      dextrose      sodium chloride 10 mL/hr at 05/26/25 0056         I/O last 3 completed shifts:  In: 335 [P.O.:335]  Out: 975 [Urine:975]    CBC: No results for input(s): \"WBC\", \"HGB\", \"PLT\" in the last 72 hours.       Recent Labs     05/24/25  1121 05/26/25  0130    142   K 5.3* 4.6    111*   CO2 12* 20*   BUN 47* 50*   CREATININE 2.4* 2.3*   GLUCOSE 225* 108*       Lab Results   Component Value Date    CALCIUM 8.5 05/26/2025    PHOS 4.5 05/23/2025       Objective:     Vitals: BP (!) 135/56   Pulse 66   Temp 97.5 °F (36.4 °C) (Oral)   Resp 10   Ht 1.727 m (5' 8\")   Wt 82.5 kg (181 lb 14.1 oz)   SpO2 100%   BMI 27.65 kg/m² ,    General appearance: 
Occupational Therapy     Occupational Therapy Treatment Note  Name: Greg Easton MRN: [id number] :             1940   Date:  2025   Admission Date: 5/15/2025 Room:  [address] [place] [address]      Communication with other providers:  RN approved session.      Subjective:  Pain: denies pain  Restrictions: general precautions, fall risk, contact isolation   No one at bedside     Objective:    Observation:  pt was supine upon arrival, agreeable to session  Objective Measures:  Pt alert to self and place     Treatment, including education:  Therapeutic Activity Training:   Therapeutic activity training was instructed today.  Cues were given for safety, sequence, UE/LE placement, visual cues, and balance.       Pt supine in bed upon arrival and completed sup to sit with MAX A x 2. Pt completed seated edge of bed with fair balance and MOD A to MAX A to scoot hips to edge of bed.  Pt completed SPT from edge of bed to chair with MAX x2. Pt demonstrated ability to take steps toward left side in transfer.  Pt seated in chair for rest break. Pt completed sit to stand from armed chair with MOD A x2 and required MOD A x2 for standing balance at sink and completed static standing approx 2-3 minutes with two trials.      Self Care Training:   Cues were given for safety, sequence, UE/LE placement, visual cues, and balance.    Activities performed today included UB/LB dressing tasks, toileting, hand hygiene at sink    Pt completed SPT from chair to toilet with set up and MOD A x2 with arm in arm assist. Pt  seated for increased time. Unable to have BM. Pt completed sit to stand with MOD A x2 and completed SPT from toilet to chair. Pt seated in chair with all needs met and call light in reach.      Education: Role of OT, OT POC, safety, benefits of EOB/OOB activity, rationale for treatment  Safety Measures: Gait belt used for safety of pt and therapist, Left in seated in chair, Alarm in place, call light and phone within reach, lines 
Occupational Therapy    Occupational Therapy Treatment Note  Name: Greg Easton MRN: [id number] :   1940   Date:  2025   Admission Date: 5/15/2025 Room:  [address] [place] [address]-A     Communication with other providers:  RN approved session.     Subjective:  Pain: denies pain  Restrictions: general precautions, fall risk, contact isolation   No one at bedside    Objective:    Observation:  pt was supine upon arrival, agreeable to session  Objective Measures:  Pt alert to self and place    Treatment, including education:  Therapeutic Activity Training:   Therapeutic activity training was instructed today.  Cues were given for safety, sequence, UE/LE placement, visual cues, and balance.      Pt supine in bed upon arrival and completed sup to sit with MAX A x 2. Pt completed seated edge of bed with fair balance and MOD A to scoot hips to edge of bed. Pt donned socks with DEP A. Pt completed sit to stand from edge of bed with arm in arm assist with MAX A x2. Pt completed SPT from edge of bed to chair with MAX x2. Pt seated in chair for rest break. Pt completed 2 sit to stand  from armed chair with MAX A x2 and required MOD A x2 for standing balance and completed static standing approx 3-5 minutes.     Education: Role of OT, OT POC, safety, benefits of EOB/OOB activity, rationale for treatment  Safety Measures: Gait belt used for safety of pt and therapist, Left in seated in chair, Alarm in place, call light and phone within reach, lines managed    Assessment / Impression:    Patient's tolerance of treatment: good  Adverse Reaction: none  Significant change in status and impact:  none  Barriers to improvement: none    Plan for Next Session:    Continue OT POC    Time in: 0950  Time out:1023    Timed treatment minutes: 33   Total treatment time: 33    Electronically signed by:       KHARI Mello      
Occupational Therapy    [facility] ACUTE CARE OCCUPATIONAL THERAPY EVALUATION  Greg Easton, 1940, 3124/3124-A, 5/17/2025    History  [place]:  The primary encounter diagnosis was Sepsis, due to unspecified organism, unspecified whether acute organ dysfunction present (HCC). Diagnoses of Complicated UTI (urinary tract infection), Acute encephalopathy, Stage 3a chronic kidney disease (HCC), and Chronic anemia were also pertinent to this visit.  Patient  has a past medical history of Acute kidney failure, Acute urinary tract infection, Anemia, Ataxia, Ataxia, Bacteremia, Candidiasis, Chronic combined systolic and diastolic congestive heart failure (HCC), Chronic kidney disease, Cognitive communication deficit, Diabetes mellitus ([facility]), Extended spectrum beta lactamase (ESBL) resistance, Fusion of spine of cervical region, Gait disturbance, History of prostate cancer, History of tobacco use, Hydronephrosis, Hyperkalemia, Hyperlipidemia, Hypertension, Hypertensive heart disease with CHF (congestive heart failure) ([facility]), Hypotension, Immunodeficiency, Metabolic encephalopathy, Muscle weakness, Osteoarthritis, Osteoarthritis, Secondary Hyperaldosteronism ([facility]), Supraventricular tachycardia, and Tubulo-interstitial nephritis.  Patient  has a past surgical history that includes Prostate surgery; other surgical history (03/28/2013); Colon surgery; Bladder surgery; Prostatectomy (1996); Bladder surgery (Left, 03/17/2022); Cystoscopy (Left, 09/07/2022); Cystoscopy (Left, 10/31/2023); change of bladder tube,complicated (5/12/2024); Cystoscopy (Left, 5/13/2024); Upper gastrointestinal endoscopy (N/A, 8/8/2024); Cystoscopy (Left, 12/10/2024); and Foot Debridement (Left, 12/18/2024).    Subjective:  Patient states:  \"I don't know if I can get up\".    Pain:  7/10 pain in L shoulder, pt stated he fell on shoulder.    Communication with other providers:  Handoff to RN  Restrictions: Contact Precautions, General 
Occupational Therapy Treatment Note    Name: Greg Easton MRN: [id number] :   1940   Date:  2025   Admission Date: 5/15/2025 Room:  [address] [place], [place] [address]     Restrictions/Precautions:          general precautions, fall risk, contact isolation     Communication with other providers: RN cleared pt for therapy, cotx with PT Denia    Subjective:  Patient states:  \"They've got the wrong fermin! I'm not having Sx today\"  Pain:   denies    Objective:    Observation: Pt supine in bed upon arrival, agreeable to therapy   Objective Measures:  A&O x name and year only    Treatment, including education:  Therapeutic Activity Training:   Therapeutic activity training was instructed today.  Cues were given for safety, sequence, UE/LE placement, awareness, and balance.    Activities performed today included bed mobility training, sup-sit   Neuro-Muscular re-education:  Cues were given for position, posture, kinesthetic sense, safety, recruitment, and rationale.  Cues were verbal and/or tactile.    Pt performed supine to EOB transfer with Mod A x2 for balance safety, trunk control, LE maneuvering, and hip pivot, pt able to assist with ~50% of LE movement, HOB elevated and use of bed rails, Max verbal cues for sequencing, significantly increased time to complete.    Pt sat EOB with Mod A to maintain upright position d/t retropulsion and lateral lean, B hand on bed/rail for support, pt able to progress to Min A with increased time,  increased TYSHAWN, mod verbal cues for improved core stability and scoot EOB to further improve balance    Pt performed EOB to supine transfer with Mod Ax2 for balance safety, trunk control, LE maneuvering, and hip pivot, pt initiate transfer back to bed d/t fear of feet touching floor when therapist attempted to assist with scoot EOB but was unable to fully elevate LEs or lower trunk at safe speed so assist provided to prevent injury, HOB elevated and use of bed rails, Max verbal cues for safety and 
Perfect serve sent to Dr. Emma benton ED RN question about exchanging SP cath. Tomasa White RN 05/15/25 2:31 PM      
Physical Therapy    Physical Therapy Treatment Note  Name: Greg Easton MRN: [id number] :   1940   Date:  2025   Admission Date: 5/15/2025 Room:  [address] [place] [address]   Restrictions/Precautions:            fall risk, gen prec   Communication with other providers:  per chart pt is appropriate for tx  Subjective:  Patient states:  pt agreeable to tx. Pt stated \" I use to work on [employer] Pat\"  Pain:   Location, Type, Intensity (0/10 to 10/10):  no c/o pain during tx session  Objective:    Observation:  alert and oriented to self and able to follow cues. Pt with bandage on right heel     Treatment, including education/measures:  Scooting in sup max/dependent assist  Sup<=>sit max assist needing increased time and effort  Pt declined working on transfers sit to stand due to left heel sore and feet feel tinder and swollen.  Pt was able to sit EOB x 15 minutes and work on ex  Trunk stretches with deep breathing  10 reps aps   10 reps laqs  10 reps marching  10 reps Shoulder rolls  Pt was able to raise left shoulder above 90 degrees 5 reps but unable to tolerate on left shoulder and reports old injury.  Left in bed with call light and alarm on. Gt belt used for transfers and gt.   Assessment / Impression:      Patient's tolerance of treatment:  good   Adverse Reaction: na  Significant change in status and impact:  na  Barriers to improvement:  strength and safety  Plan for Next Session:    Cont. POC                Time in:  1400  Time out:  1425  Timed treatment minutes:  25  Total treatment time:  25    Previously filed items:           Short Term Goals  Time Frame for Short Term Goals: 1 week  Short Term Goal 1: pt to complete all bed mobility min A  Short Term Goal 2: pt to sit EOB 10 minutes SBA with no LOB  Short Term Goal 3: pt to complete stand pivot transfer with LRAD mod A x2    Electronically signed by:    Estrellita Benitez PTA  2025, 8:05 AM     
Physical Therapy    Physical Therapy Treatment Note  Name: Greg Easton MRN: [id number] :   1940   Date:  2025   Admission Date: 5/15/2025 Room:  [address] [place] [address]   Restrictions/Precautions:          fall risk, gen prec  Communication with other providers:  cotx zamora  Subjective:  Patient states:  agreeable  Pain:   Location, Type, Intensity (0/10 to 10/10):  does not rate  Objective:    Observation:  in bed  Treatment, including education/measures:  Pt lacking knee ext and hip ext bilat, improves /c movement and vc's bed mob sup>sit modAx1  STS and SPT modA x2 x multi sets /c vc for proper form  STS @ sink for UE support modA x2  Stand balance /c vc and Eitan for trunk/hip/knee ext, which improves. P+ stand balance grade. X~1-2' stands x multi sets.vc for safe techs, body [facility]  Assessment / Impression:    Pt tolerates tx, very limited by lacking ROM, which has room for improvement /c activity  Patient's tolerance of treatment:  good   Adverse Reaction: na  Significant change in status and impact:  na  Barriers to improvement:  weakness and endurance  Plan for Next Session:    Cont transfer                Time in:  1028  Time out:  1106  Timed treatment minutes:  38  Total treatment time:  38    Previously filed items:           Short Term Goals  Time Frame for Short Term Goals: 1 week  Short Term Goal 1: pt to complete all bed mobility min A  Short Term Goal 2: pt to sit EOB 10 minutes SBA with no LOB  Short Term Goal 3: pt to complete stand pivot transfer with LRAD mod A x2    Electronically signed by:    Be Crook PTA  2025, 12:51 PM      
Secure chat sent to [facility] as follows:    pts sugar came up to 60 post the glucose tabs earlier and was given D10 at 2144, 125Ml to cover as he was not able to tolerate 4 more tabs. Glucose came up to 122 at 2210. Pt given 1/2 a container of glucerna at 2345. Pt NPO at midnight. At 0238 glucose was 49. D10 125ml at 0245 given. Will recheck sugar in 15 min and let you know the result. Do you want to start him on D5 or something as he is NPO for a procedure today?   
Spiritual Health History and Assessment/Progress Note  [facility]    Spiritual/Emotional Needs,  ,  ,      Name: Greg Easton MRN: [id number]    Age: 84 y.o.     Sex: male   Language: English   Congregation: [employer]   Complicated UTI (urinary tract infection)     Date: 5/22/2025            Total Time Calculated: 25 min              Spiritual Assessment began in [facility]        Referral/Consult From: Rounding   Encounter Overview/Reason: Spiritual/Emotional Needs  Service Provided For: Patient    Beatris, Belief, Meaning:   Patient identifies as spiritual  Family/Friends No family/friends present      Importance and Influence:  Patient has spiritual/personal beliefs that influence decisions regarding their health  Family/Friends No family/friends present    Community:  Patient is connected with a spiritual community  Family/Friends No family/friends present    Assessment and Plan of Care:     Patient Interventions include: Facilitated expression of thoughts and feelings, Explored spiritual coping/struggle/distress, and Affirmed coping skills/support systems  Family/Friends Interventions include: No family/friends present    Patient Plan of Care: Spiritual Care available upon further referral  Family/Friends Plan of Care: No family/friends present    Electronically signed by ROCHELLE Villeda on 5/22/2025 at 11:26 AM    
Went through discharge instructions with son, he verbalized understanding. Pt to follow up with pcp/dr. Suarez/dr yang in 1 week. Son is to call [facility] hospice and let them know when pt is home so they can resume the hospice at home. Scripts were escribed to [facility], family will pick them up tomorrow. [facility] here to take pt home. IV and tele removed. Suprapubic mccrary will stay in. Pt clean and dry  
collection, mass effect, or midline shift appreciated. No depressed or displaced calvarial fracture is demonstrated. The visualized paranasal sinuses and bilateral mastoid air cells are adequately pneumatized. IMPRESSION: Limited study due to motion 1. Likely moderate chronic small vessel ischemic change without evidence of acute intracranial abnormality.  Dictated and Electronically Signed By: Jameel Callahan MD [facility] 5/15/2025 11:57          CBC:   Recent Labs     05/18/25  0239 05/19/25  0514 05/20/25  0405   WBC 5.0 7.2 5.6   HGB 7.1* 7.2* 8.0*    206 220     BMP:    Recent Labs     05/18/25  0239 05/19/25  0514 05/20/25  0405    138 138   K 4.8 5.5* 6.0*   * 110 109   CO2 16* 16* 17*   BUN 64* 60* 54*   CREATININE 3.0* 2.8* 2.5*   GLUCOSE 415* 86 214*     Hepatic:   No results for input(s): \"AST\", \"ALT\", \"BILITOT\", \"ALKPHOS\" in the last 72 hours.    Invalid input(s): \"ALB\"    Lipids:   Lab Results   Component Value Date/Time    CHOL 171 05/30/2023 06:48 AM    HDL 43 05/30/2023 06:48 AM    TRIG 129 05/30/2023 06:48 AM     Hemoglobin A1C:   Lab Results   Component Value Date/Time    LABA1C 8.0 05/18/2025 02:39 AM     TSH:   Lab Results   Component Value Date/Time    TSH 4.34 05/17/2025 10:45 AM     Troponin:   Lab Results   Component Value Date/Time    TROPONINT 0.038 05/21/2023 07:14 PM    TROPONINT 0.044 10/24/2022 12:20 PM    TROPONINT 0.030 04/20/2022 07:30 AM     Lactic Acid:   No results for input(s): \"LACTA\" in the last 72 hours.    BNP:   No results for input(s): \"PROBNP\" in the last 72 hours.    UA:  Lab Results   Component Value Date/Time    NITRU NEGATIVE 05/15/2025 10:58 AM    NITRU NEGATIVE 03/23/2013 02:24 PM    COLORU Yellow 05/15/2025 10:58 AM    PHUR 5.5 05/15/2025 10:58 AM    WBCUA 2421 05/15/2025 10:58 AM    RBCUA 53 05/15/2025 10:58 AM    RBCUA 61 08/03/2024 12:50 PM    MUCUS RARE 11/11/2024 05:00 AM    TRICHOMONAS None seen 12/17/2024 07:35 PM

## 2025-05-29 NOTE — DISCHARGE SUMMARY
V2.0  Discharge Summary    Name:  Greg Easton /Age/Sex: 1940 (84 y.o. male)   Admit Date: 5/15/2025  Discharge Date: 25    MRN & CSN:  [id number] & [id number] Encounter Date and Time 25 4:56 PM EDT    Attending:  No att. providers found Discharging Provider: Rachael Moore MD       Hospital Course:     Brief HPI: Greg Easton is a 84 y.o. male with pmh of recurrent complicated UTI, chronic suprapubic catheter, dementia, left ankle wound, hypothyroidism, hyperlipidemia, who presents with slow speech, less conversational, more fatigued compared to baseline.  Patient's son reports that he recurrently has urine infection and his symptoms are same each time.  No other focal neurological deficit could be appreciated within the limitation of patient's delirium and dementia.     Brief Problem Based Course:   Complicated UTI due to ESBL E. coli  - UA with numerous white cells  - Urine culture with over 50,000 CFU per mL mixed skin/urogenital jean and over 100,000 colonies of E. Coli  - Based on sensitivities meropenem was started and patient finished a 10-day course of antibiotic        Chronic left hydronephrosis  -Managed with indwelling stent  - Stent was last exchanged in 2020 for  - Underwent cystoscopy and left ureteral stent exchange Monday, 2025     Chronic urinary retention status post chronic SPT last exchanged 2025  - Nursing to manually irrigate the bladder every shift and as needed for debris per urology  - Continue monthly SPT changes per urology     History of prostate cancer status post RPP with Dr. Jose in   - PSA was 0.71 in 2024     Dementia  - Supportive care     Left chronic ankle wound-wound care team consult appreciated     Chronic kidney disease     Hypertension,  - resume home medications     Hypothyroidism-levothyroxine     Hyperlipidemia-atorvastatin    Initially plan was to send patient to skilled nursing facility but due to insurance issues he

## 2025-06-12 ENCOUNTER — APPOINTMENT (OUTPATIENT)
Dept: GENERAL RADIOLOGY | Age: 85
End: 2025-06-12
Payer: MEDICARE

## 2025-06-12 ENCOUNTER — HOSPITAL ENCOUNTER (EMERGENCY)
Age: 85
Discharge: HOME | End: 2025-06-13
Payer: MEDICARE

## 2025-06-12 DIAGNOSIS — T83.511A URINARY TRACT INFECTION ASSOCIATED WITH INDWELLING URETHRAL CATHETER, INITIAL ENCOUNTER: ICD-10-CM

## 2025-06-12 DIAGNOSIS — R41.0 EPISODE OF CONFUSION: ICD-10-CM

## 2025-06-12 DIAGNOSIS — R73.9 HYPERGLYCEMIA: Primary | ICD-10-CM

## 2025-06-12 DIAGNOSIS — N39.0 URINARY TRACT INFECTION ASSOCIATED WITH INDWELLING URETHRAL CATHETER, INITIAL ENCOUNTER: ICD-10-CM

## 2025-06-12 LAB
ALBUMIN SERPL-MCNC: 3.4 G/DL (ref 3.4–5)
ALBUMIN/GLOB SERPL: 0.9 {RATIO} (ref 1.1–2.2)
ALP SERPL-CCNC: 109 U/L (ref 40–129)
ALT SERPL-CCNC: 15 U/L (ref 10–40)
ANION GAP SERPL CALCULATED.3IONS-SCNC: 13 MMOL/L (ref 9–17)
ARTERIAL PATENCY WRIST A: ABNORMAL
AST SERPL-CCNC: 19 U/L (ref 15–37)
B-OH-BUTYR SERPL-MCNC: 0.13 MMOL/L (ref 0–0.27)
BACTERIA URNS QL MICRO: ABNORMAL
BASOPHILS # BLD: 0.04 K/UL
BASOPHILS NFR BLD: 0 % (ref 0–1)
BILIRUB SERPL-MCNC: <0.2 MG/DL (ref 0–1)
BILIRUB UR QL STRIP: NEGATIVE
BODY TEMPERATURE: 37
BUN SERPL-MCNC: 27 MG/DL (ref 7–20)
CALCIUM SERPL-MCNC: 9.1 MG/DL (ref 8.3–10.6)
CHARACTER UR: ABNORMAL
CHLORIDE SERPL-SCNC: 104 MMOL/L (ref 99–110)
CHP ED QC CHECK: NORMAL
CLARITY UR: ABNORMAL
CO2 SERPL-SCNC: 23 MMOL/L (ref 21–32)
COHGB MFR BLD: 1.1 % (ref 0.5–1.5)
COLOR UR: YELLOW
CREAT SERPL-MCNC: 2.1 MG/DL (ref 0.8–1.3)
EOSINOPHIL # BLD: 0.19 K/UL
EOSINOPHILS RELATIVE PERCENT: 2 % (ref 0–3)
EPI CELLS #/AREA URNS HPF: <1 /HPF
ERYTHROCYTE [DISTWIDTH] IN BLOOD BY AUTOMATED COUNT: 15.2 % (ref 11.7–14.9)
GFR, ESTIMATED: 30 ML/MIN/1.73M2
GLUCOSE BLD-MCNC: 318 MG/DL (ref 74–99)
GLUCOSE BLD-MCNC: 354 MG/DL (ref 74–99)
GLUCOSE BLD-MCNC: 431 MG/DL (ref 74–99)
GLUCOSE BLD-MCNC: 441 MG/DL
GLUCOSE SERPL-MCNC: 431 MG/DL (ref 74–99)
GLUCOSE UR STRIP-MCNC: >=1000 MG/DL
HCO3 VENOUS: 26.5 MMOL/L (ref 22–29)
HCT VFR BLD AUTO: 25.5 % (ref 42–52)
HGB BLD-MCNC: 7.8 G/DL (ref 13.5–18)
HGB UR QL STRIP.AUTO: ABNORMAL
IMM GRANULOCYTES # BLD AUTO: 0.03 K/UL
IMM GRANULOCYTES NFR BLD: 0 %
KETONES UR STRIP-MCNC: NEGATIVE MG/DL
LEUKOCYTE ESTERASE UR QL STRIP: ABNORMAL
LYMPHOCYTES NFR BLD: 1.28 K/UL
LYMPHOCYTES RELATIVE PERCENT: 13 % (ref 24–44)
MAGNESIUM SERPL-MCNC: 1.6 MG/DL (ref 1.8–2.4)
MCH RBC QN AUTO: 29.7 PG (ref 27–31)
MCHC RBC AUTO-ENTMCNC: 30.6 G/DL (ref 32–36)
MCV RBC AUTO: 97 FL (ref 78–100)
METHEMOGLOBIN: 1 % (ref 0.5–1.5)
MONOCYTES NFR BLD: 0.69 K/UL
MONOCYTES NFR BLD: 7 % (ref 0–5)
NEUTROPHILS NFR BLD: 78 % (ref 36–66)
NEUTS SEG NFR BLD: 7.83 K/UL
NITRITE UR QL STRIP: NEGATIVE
OXYHGB MFR BLD: 24.2 %
PCO2 VENOUS: 52.9 MM HG (ref 38–54)
PH UR STRIP: 6.5 [PH] (ref 5–8)
PH VENOUS: 7.32 (ref 7.32–7.43)
PLATELET # BLD AUTO: 321 K/UL (ref 140–440)
PMV BLD AUTO: 10.5 FL (ref 7.5–11.1)
PO2 VENOUS: 18 MM HG (ref 23–48)
POSITIVE BASE EXCESS, VEN: 0.4 MMOL/L (ref 0–3)
POTASSIUM SERPL-SCNC: 3.9 MMOL/L (ref 3.5–5.1)
PROT SERPL-MCNC: 7.1 G/DL (ref 6.4–8.2)
PROT UR STRIP-MCNC: 100 MG/DL
RBC # BLD AUTO: 2.63 M/UL (ref 4.6–6.2)
RBC #/AREA URNS HPF: 9 /HPF (ref 0–2)
SODIUM SERPL-SCNC: 140 MMOL/L (ref 136–145)
SP GR UR STRIP: 1.01 (ref 1–1.03)
UROBILINOGEN UR STRIP-ACNC: 0.2 EU/DL (ref 0–1)
WBC #/AREA URNS HPF: 210 /HPF (ref 0–5)
WBC OTHER # BLD: 10.1 K/UL (ref 4–10.5)

## 2025-06-12 PROCEDURE — 82010 KETONE BODYS QUAN: CPT

## 2025-06-12 PROCEDURE — 80053 COMPREHEN METABOLIC PANEL: CPT

## 2025-06-12 PROCEDURE — 81001 URINALYSIS AUTO W/SCOPE: CPT

## 2025-06-12 PROCEDURE — 36415 COLL VENOUS BLD VENIPUNCTURE: CPT

## 2025-06-12 PROCEDURE — 2580000003 HC RX 258: Performed by: PHYSICIAN ASSISTANT

## 2025-06-12 PROCEDURE — 82805 BLOOD GASES W/O2 SATURATION: CPT

## 2025-06-12 PROCEDURE — 82962 GLUCOSE BLOOD TEST: CPT

## 2025-06-12 PROCEDURE — 71045 X-RAY EXAM CHEST 1 VIEW: CPT

## 2025-06-12 PROCEDURE — 6360000002 HC RX W HCPCS: Performed by: PHYSICIAN ASSISTANT

## 2025-06-12 PROCEDURE — 76937 US GUIDE VASCULAR ACCESS: CPT

## 2025-06-12 PROCEDURE — 85025 COMPLETE CBC W/AUTO DIFF WBC: CPT

## 2025-06-12 PROCEDURE — 83735 ASSAY OF MAGNESIUM: CPT

## 2025-06-12 PROCEDURE — 96361 HYDRATE IV INFUSION ADD-ON: CPT

## 2025-06-12 PROCEDURE — 99285 EMERGENCY DEPT VISIT HI MDM: CPT

## 2025-06-12 PROCEDURE — 93005 ELECTROCARDIOGRAM TRACING: CPT | Performed by: PHYSICIAN ASSISTANT

## 2025-06-12 PROCEDURE — 96366 THER/PROPH/DIAG IV INF ADDON: CPT

## 2025-06-12 PROCEDURE — 96365 THER/PROPH/DIAG IV INF INIT: CPT

## 2025-06-12 PROCEDURE — 96375 TX/PRO/DX INJ NEW DRUG ADDON: CPT

## 2025-06-12 PROCEDURE — 6370000000 HC RX 637 (ALT 250 FOR IP): Performed by: PHYSICIAN ASSISTANT

## 2025-06-12 PROCEDURE — 87086 URINE CULTURE/COLONY COUNT: CPT

## 2025-06-12 RX ORDER — AMOXICILLIN AND CLAVULANATE POTASSIUM 500; 125 MG/1; MG/1
1 TABLET, FILM COATED ORAL 2 TIMES DAILY
Qty: 20 TABLET | Refills: 0 | Status: SHIPPED | OUTPATIENT
Start: 2025-06-12 | End: 2025-06-22

## 2025-06-12 RX ORDER — 0.9 % SODIUM CHLORIDE 0.9 %
1000 INTRAVENOUS SOLUTION INTRAVENOUS ONCE
Status: COMPLETED | OUTPATIENT
Start: 2025-06-12 | End: 2025-06-12

## 2025-06-12 RX ORDER — AMOXICILLIN AND CLAVULANATE POTASSIUM 500; 125 MG/1; MG/1
1 TABLET, FILM COATED ORAL ONCE
Status: COMPLETED | OUTPATIENT
Start: 2025-06-12 | End: 2025-06-12

## 2025-06-12 RX ORDER — MAGNESIUM SULFATE IN WATER 40 MG/ML
2000 INJECTION, SOLUTION INTRAVENOUS ONCE
Status: COMPLETED | OUTPATIENT
Start: 2025-06-12 | End: 2025-06-12

## 2025-06-12 RX ADMIN — MAGNESIUM SULFATE HEPTAHYDRATE 2000 MG: 40 INJECTION, SOLUTION INTRAVENOUS at 21:18

## 2025-06-12 RX ADMIN — INSULIN HUMAN 8 UNITS: 100 INJECTION, SOLUTION PARENTERAL at 21:18

## 2025-06-12 RX ADMIN — AMOXICILLIN AND CLAVULANATE POTASSIUM 1 TABLET: 500; 125 TABLET, FILM COATED ORAL at 23:13

## 2025-06-12 RX ADMIN — SODIUM CHLORIDE 1000 ML: 0.9 INJECTION, SOLUTION INTRAVENOUS at 20:23

## 2025-06-12 ASSESSMENT — PAIN - FUNCTIONAL ASSESSMENT: PAIN_FUNCTIONAL_ASSESSMENT: NONE - DENIES PAIN

## 2025-06-12 ASSESSMENT — PAIN SCALES - GENERAL: PAINLEVEL_OUTOF10: 0

## 2025-06-12 NOTE — ED NOTES
Patient arrives via Ems fro home with c/o altered mental status. EMS states they went on the patient this morning and they decided not to transfer then were called back to the residence. Patient is under Hospice care and has a DNR-CC on file with us. Patient is alert and oriented X 3. Patient states he has no complaints and states he feels better than yesterday. Son who makes medical decisions Per EMS will be here shortly. Patient does have a high blood sugar of 497 per EMS. Patient is in end stage renal failure per EMS. Respirations equal and unlabored skin warm and dry.

## 2025-06-12 NOTE — CARE COORDINATION
CM consulted. Pt from home with son. Pt is on hospice. Son concerned of pt's blood sugar and mentioned that his speech was slurred earlier but seems to have corrected itself. SW did not hear a slur in the pt's speech. Pt was alert and oriented. Pt has no needs. Pt medically clear, he can be returned home in the care of his sons and hospice team. Cm will continue to follow for additional needs.    06/12/25 3737   Service Assessment   Patient Orientation Alert and Oriented   Cognition Alert   History Provided By Patient;Child/Family;Medical Record   Primary Caregiver Family   Accompanied By/Relationship [facility]   Patient's Healthcare Decision Maker is: Legal Next of Kin   PCP Verified by CM No  (Pt is on hospice)   Current Functional Level Assistance with the following:;Other (see comment)  (Pt is moved by matheus, no mobility)   Can patient return to prior living arrangement Yes   Ability to make needs known: Good   Family able to assist with home care needs: Yes   Would you like for me to discuss the discharge plan with any other family members/significant others, and if so, who? Yes  (Sons and hospice)   Financial Resources Medicare   Community Resources None   CM/SW Referral Other (see comment)  ([place] planning)

## 2025-06-12 NOTE — ED NOTES
12 lead EKG per my interpretation:  Ventricular rate 94 bpm, WI interval 210 ms, QRS duration 76 ms, QT/QTc 366/457 ms.  Significant artifact present which makes interpretation somewhat limited.  Appears to be sinus rhythm.  With in the inferior lateral leads there is significant artifact which makes subtle ST segment changes difficult however no clear STEMI criteria       Patrick Rico DO  06/12/25 1462

## 2025-06-12 NOTE — ED PROVIDER NOTES
EMERGENCY DEPARTMENT ENCOUNTER        Pt Name: Greg Easton  MRN: [id number]  Birthdate 1940  Date of evaluation: 6/12/2025  Provider: Fatmata Burnett PA-C  PCP: Jacobo Zazueta MD    NENA. I have evaluated this patient.        Triage CHIEF COMPLAINT       Chief Complaint   Patient presents with    Altered Mental Status    Hyperglycemia         HISTORY OF PRESENT ILLNESS      Chief Complaint: Confusion, hyperglycemia    Greg Easton is a 84 y.o. male who presents for confusion and hyperglycemia.  Patient accompanied by his son.  He tells me that patient has had 2 episodes of confusion today.  The first occurred this morning--EMS was called out but he was back to baseline, so did not transfer out.  The second occurred just prior to arrival.  Patient's [facility] nurse was present.  He needed to have a bowel movement so they had helped him to the bedside commode and he was able to go.  He was then talking \"off the wall\" again and seemed to have some slurred speech.  Son reports he knew his name and birthday but not the month/day.  He checked his blood sugar and it read \"high\".  Patient is diabetic--had just had [employer] and ate well but has not had any insulin today.  Otherwise denies any fever, cough/congestion, vomiting.  He has chronic pressure ulcers to his feet and nurse had freshly dressed those today.       DNR-CCA on Hospice.    Nursing Notes were all reviewed and agreed with or any disagreements were addressed in the HPI.    REVIEW OF SYSTEMS     CONSTITUTIONAL:  Denies fever.  EYES:  Denies visual changes.  HEAD:  Denies headache.  ENT:  Denies earache, nasal congestion, sore throat.  NECK:  Denies neck pain.  RESPIRATORY:  Denies any shortness of breath.  CARDIOVASCULAR:  Denies chest pain.  GI:  Denies nausea or vomiting.    :  Denies urinary symptoms.  MUSCULOSKELETAL:  Denies extremity pain or swelling.  BACK:  Denies back pain.  INTEGUMENT:  Denies skin changes.  LYMPHATIC:  Denies  of cervical region 10/01/2012    Gait disturbance 01/01/2011    History of prostate cancer 01/01/1996    adenocarcinoma    History of tobacco use 01/01/1959    Hydronephrosis     Hyperkalemia     Hyperlipidemia 01/01/2011    Hypertension     Hypertensive heart disease with CHF (congestive heart failure) (HCC)     Hypotension     Immunodeficiency     Metabolic encephalopathy     Muscle weakness     Osteoarthritis 01/01/2011    Osteoarthritis     Secondary Hyperaldosteronism (HCC)     Supraventricular tachycardia     Tubulo-interstitial nephritis        Discussion with Other Profesionals : Case Management SUKHWINDER Stanford met with patient and family.    Social Determinants : None    Records Reviewed : Inpatient Notes from recent admission 5/15/2025 for sepsis, including urine culture at that time     Patient's EMR reviewed for information on past medical history, current medications, and any pertinent inpatient/outpatient encounters.      ED Course/Reassessment/Disposition:  Patient seen and examined.  Labs, imaging obtained.  Initial gluocse 441 but he is not in DKA.  Stable renal function with Cr 2.1.  Mg 1.6--replacement given.  Electrolytes are otherwise normal.  Stable anemia Hgb 7.8.  UA is concerning with moderate leuks, 210 WBC, 9 RBC, rare bacteria.  Son reports mccrary exchange today.  Will send culture and start on Augmentin which last urine culture was sensitive to.  Patient's glucose improved with fluids and insulin.  He is wanting to go home.  CM met with family to ensure no additional needs--he is well established with hospice and has all equipment needed.  Return as needed.     Disposition Considerations (tests considered but not done, Shared Decision Making, Patient Expectation of Test or Treatment):     Appropriate for outpatient management      Stable at discharge.    I am the Primary Clinician of Record.  Supervising physician was Dr. Castellanos.  Patient was seen independently.        CLINICAL IMPRESSION

## 2025-06-13 VITALS
HEART RATE: 96 BPM | SYSTOLIC BLOOD PRESSURE: 166 MMHG | BODY MASS INDEX: 27.43 KG/M2 | DIASTOLIC BLOOD PRESSURE: 91 MMHG | WEIGHT: 181 LBS | TEMPERATURE: 98.2 F | HEIGHT: 68 IN | OXYGEN SATURATION: 99 % | RESPIRATION RATE: 20 BRPM

## 2025-06-13 LAB
EKG ATRIAL RATE: 94 BPM
EKG DIAGNOSIS: NORMAL
EKG P AXIS: 40 DEGREES
EKG P-R INTERVAL: 210 MS
EKG Q-T INTERVAL: 366 MS
EKG QRS DURATION: 76 MS
EKG QTC CALCULATION (BAZETT): 457 MS
EKG R AXIS: -25 DEGREES
EKG T AXIS: -11 DEGREES
EKG VENTRICULAR RATE: 94 BPM

## 2025-06-13 PROCEDURE — 93010 ELECTROCARDIOGRAM REPORT: CPT | Performed by: INTERNAL MEDICINE

## 2025-06-13 ASSESSMENT — PAIN SCALES - GENERAL
PAINLEVEL_OUTOF10: 0
PAINLEVEL_OUTOF10: 0

## 2025-06-13 ASSESSMENT — PAIN - FUNCTIONAL ASSESSMENT: PAIN_FUNCTIONAL_ASSESSMENT: 0-10

## 2025-06-13 NOTE — CONSULTS
Consult completed. Nexiva 20g 1.75\" peripheral IV initiated into LAC x 1 attempt using ultrasound guided technique. Brisk blood return, flushes without resistance. Lab draw. Patient tolerated well. Primary nurse notified.     Consult the Vascular Access Team for questions, concerns, or change in patient's condition.

## 2025-06-14 ENCOUNTER — RESULTS FOLLOW-UP (OUTPATIENT)
Dept: EMERGENCY DEPT | Age: 85
End: 2025-06-14

## 2025-06-14 LAB
MICROORGANISM SPEC CULT: NORMAL
SPECIMEN DESCRIPTION: NORMAL

## 2025-07-16 ENCOUNTER — HOSPITAL ENCOUNTER (EMERGENCY)
Age: 85
Discharge: HOME | End: 2025-07-16
Attending: EMERGENCY MEDICINE
Payer: MEDICARE

## 2025-07-16 ENCOUNTER — APPOINTMENT (OUTPATIENT)
Dept: CT IMAGING | Age: 85
End: 2025-07-16
Payer: MEDICARE

## 2025-07-16 VITALS
WEIGHT: 180 LBS | RESPIRATION RATE: 18 BRPM | SYSTOLIC BLOOD PRESSURE: 166 MMHG | HEIGHT: 68 IN | TEMPERATURE: 98.5 F | OXYGEN SATURATION: 100 % | BODY MASS INDEX: 27.28 KG/M2 | HEART RATE: 92 BPM | DIASTOLIC BLOOD PRESSURE: 55 MMHG

## 2025-07-16 DIAGNOSIS — K56.41 FECAL IMPACTION (HCC): ICD-10-CM

## 2025-07-16 DIAGNOSIS — K59.00 CONSTIPATION, UNSPECIFIED CONSTIPATION TYPE: Primary | ICD-10-CM

## 2025-07-16 LAB
ALBUMIN SERPL-MCNC: 3.3 G/DL (ref 3.4–5)
ALBUMIN/GLOB SERPL: 0.9 {RATIO} (ref 1.1–2.2)
ALP SERPL-CCNC: 97 U/L (ref 40–129)
ALT SERPL-CCNC: 8 U/L (ref 10–40)
ANION GAP SERPL CALCULATED.3IONS-SCNC: 13 MMOL/L (ref 9–17)
AST SERPL-CCNC: 21 U/L (ref 15–37)
BASOPHILS # BLD: 0.04 K/UL
BASOPHILS NFR BLD: 0 % (ref 0–1)
BILIRUB SERPL-MCNC: 0.2 MG/DL (ref 0–1)
BUN SERPL-MCNC: 38 MG/DL (ref 7–20)
CALCIUM SERPL-MCNC: 8.8 MG/DL (ref 8.3–10.6)
CHLORIDE SERPL-SCNC: 99 MMOL/L (ref 99–110)
CO2 SERPL-SCNC: 24 MMOL/L (ref 21–32)
CREAT SERPL-MCNC: 2.4 MG/DL (ref 0.8–1.3)
EOSINOPHIL # BLD: 0.27 K/UL
EOSINOPHILS RELATIVE PERCENT: 3 % (ref 0–3)
ERYTHROCYTE [DISTWIDTH] IN BLOOD BY AUTOMATED COUNT: 16.5 % (ref 11.7–14.9)
GFR, ESTIMATED: 26 ML/MIN/1.73M2
GLUCOSE SERPL-MCNC: 247 MG/DL (ref 74–99)
HCT VFR BLD AUTO: 29.3 % (ref 42–52)
HGB BLD-MCNC: 9 G/DL (ref 13.5–18)
IMM GRANULOCYTES # BLD AUTO: 0.1 K/UL
IMM GRANULOCYTES NFR BLD: 1 %
LACTATE BLDV-SCNC: 0.8 MMOL/L (ref 0.4–2)
LACTATE BLDV-SCNC: 1.1 MMOL/L (ref 0.4–2)
LIPASE SERPL-CCNC: 37 U/L (ref 13–60)
LYMPHOCYTES NFR BLD: 1.74 K/UL
LYMPHOCYTES RELATIVE PERCENT: 17 % (ref 24–44)
MCH RBC QN AUTO: 29.2 PG (ref 27–31)
MCHC RBC AUTO-ENTMCNC: 30.7 G/DL (ref 32–36)
MCV RBC AUTO: 95.1 FL (ref 78–100)
MONOCYTES NFR BLD: 0.65 K/UL
MONOCYTES NFR BLD: 6 % (ref 0–5)
NEUTROPHILS NFR BLD: 73 % (ref 36–66)
NEUTS SEG NFR BLD: 7.55 K/UL
PLATELET # BLD AUTO: 338 K/UL (ref 140–440)
PMV BLD AUTO: 10.2 FL (ref 7.5–11.1)
POTASSIUM SERPL-SCNC: 5.3 MMOL/L (ref 3.5–5.1)
PROT SERPL-MCNC: 6.7 G/DL (ref 6.4–8.2)
RBC # BLD AUTO: 3.08 M/UL (ref 4.6–6.2)
SODIUM SERPL-SCNC: 137 MMOL/L (ref 136–145)
WBC OTHER # BLD: 10.4 K/UL (ref 4–10.5)

## 2025-07-16 PROCEDURE — 87086 URINE CULTURE/COLONY COUNT: CPT

## 2025-07-16 PROCEDURE — 76937 US GUIDE VASCULAR ACCESS: CPT

## 2025-07-16 PROCEDURE — 99284 EMERGENCY DEPT VISIT MOD MDM: CPT

## 2025-07-16 PROCEDURE — 87077 CULTURE AEROBIC IDENTIFY: CPT

## 2025-07-16 PROCEDURE — 80053 COMPREHEN METABOLIC PANEL: CPT

## 2025-07-16 PROCEDURE — 83605 ASSAY OF LACTIC ACID: CPT

## 2025-07-16 PROCEDURE — 74176 CT ABD & PELVIS W/O CONTRAST: CPT

## 2025-07-16 PROCEDURE — 85025 COMPLETE CBC W/AUTO DIFF WBC: CPT

## 2025-07-16 PROCEDURE — 87186 SC STD MICRODIL/AGAR DIL: CPT

## 2025-07-16 PROCEDURE — 83690 ASSAY OF LIPASE: CPT

## 2025-07-16 NOTE — ED NOTES
Large amount of stool expelled via rectum with disimpaction and after enema.    Disimpaction and administration of enema performed by physician.

## 2025-07-16 NOTE — ED NOTES
Incontinence care provided. Patient expelled medium sized, soft brown stool from rectum. Breakdown noted to buttocks.

## 2025-07-16 NOTE — ED NOTES
1334 called [facility] for BLS unit to transport returning resident back to [facility] at [place], Spoke with Laurie at dispatch. ETA for  scheduled for 1345.

## 2025-07-16 NOTE — CONSULTS
Consult completed. Procedure/rationale explained to pt & consent obtained. #22ga Nexiva Diffusics high-pressure-injectable extra-long, 1.75\" PIV initiated using UltraSound-guided technique without difficulty/complications. Lab specimens drawn/labeled/sent. Manuel QUIÑONES and Dr. Clinton notified. Pt tolerated well and no other c/o or needs noted or reported.

## 2025-07-16 NOTE — ED NOTES
1910 called Emergency contact on pt chart (Jon Easton) son [phone] left voice message that patient left belongings in ED after being discharged. Call Security dept for belongings.

## 2025-07-16 NOTE — ED PROVIDER NOTES
Triage Chief Complaint:   Abdominal Pain (Per EMS they were called for c/o abd pain. )    [place]:  Greg Easton is a 84 y.o. male that presents with episode of abdominal pain.  EMS was called out to patient's house for abdominal pain.  Patient reports that he feels much better now but he was having abdominal pain in his lower abdomen.  Pain was severe in onset but spontaneously resolved.  No fevers.  No vomiting.  No change in urine output with chronic suprapubic catheter in place.    Patient did just get home from respite care at nursing facility.  Patient lives with sons.    Sons do report that patient does have constipation issues at baseline and is not currently taking any thing for this.    ROS:  General:  No fevers, no chills, no weakness  Eyes:  No recent vison changes, no discharge  ENT:  No sore throat, no nasal congestion, no hearing changes  Cardiovascular:  No chest pain, no palpitations  Respiratory:  No shortness of breath, no cough, no wheezing  Gastrointestinal:  + pain, no nausea, no vomiting, no diarrhea, + constipation  Musculoskeletal:  No muscle pain, no joint pain  Skin:  No rash, no pruritis, no easy bruising  Neurologic:  No speech problems, no headache, no extremity numbness, no extremity tingling, no extremity weakness  Psychiatric:  No anxiety  Genitourinary:  No dysuria, no hematuria  Endocrine:  No unexpected weight gain, no unexpected weight loss  Extremities:  no edema, no pain    Past Medical History:   Diagnosis Date    Acute kidney failure     Acute urinary tract infection 03/15/2012    Anemia     Ataxia 01/01/2010    Ataxia     Bacteremia     Candidiasis     Chronic combined systolic and diastolic congestive heart failure (HCC)     Chronic kidney disease     Cognitive communication deficit     Diabetes mellitus (HCC) 01/01/2011    type 2, controlled    Extended spectrum beta lactamase (ESBL) resistance     Fusion of spine of cervical region 10/01/2012    Gait disturbance 01/01/2011

## 2025-07-18 LAB
MICROORGANISM SPEC CULT: ABNORMAL
SERVICE CMNT-IMP: ABNORMAL
SPECIMEN DESCRIPTION: ABNORMAL

## 2025-07-18 RX ORDER — CIPROFLOXACIN 500 MG/1
250 TABLET, FILM COATED ORAL 2 TIMES DAILY
Qty: 10 TABLET | Refills: 0 | Status: SHIPPED | OUTPATIENT
Start: 2025-07-18 | End: 2025-07-28

## 2025-07-28 ENCOUNTER — APPOINTMENT (OUTPATIENT)
Dept: CT IMAGING | Age: 85
End: 2025-07-28
Payer: MEDICARE

## 2025-07-28 ENCOUNTER — HOSPITAL ENCOUNTER (EMERGENCY)
Age: 85
Discharge: HOME | End: 2025-07-28
Attending: STUDENT IN AN ORGANIZED HEALTH CARE EDUCATION/TRAINING PROGRAM
Payer: MEDICARE

## 2025-07-28 VITALS
BODY MASS INDEX: 27.37 KG/M2 | SYSTOLIC BLOOD PRESSURE: 138 MMHG | OXYGEN SATURATION: 100 % | WEIGHT: 180 LBS | TEMPERATURE: 98.2 F | HEART RATE: 76 BPM | DIASTOLIC BLOOD PRESSURE: 60 MMHG | RESPIRATION RATE: 12 BRPM

## 2025-07-28 DIAGNOSIS — K59.00 CONSTIPATION, UNSPECIFIED CONSTIPATION TYPE: Primary | ICD-10-CM

## 2025-07-28 LAB
ARTERIAL PATENCY WRIST A: ABNORMAL
BODY TEMPERATURE: 37
COHGB MFR BLD: 0.3 % (ref 0.5–1.5)
GLUCOSE BLD-MCNC: 396 MG/DL (ref 74–99)
HCO3 VENOUS: 24.4 MMOL/L (ref 22–29)
METHEMOGLOBIN: 0.5 % (ref 0.5–1.5)
OXYHGB MFR BLD: 85.1 %
PCO2 VENOUS: 35.6 MM HG (ref 38–54)
PH VENOUS: 7.45 (ref 7.32–7.43)
PO2 VENOUS: 49.7 MM HG (ref 23–48)
POSITIVE BASE EXCESS, VEN: 0.6 MMOL/L (ref 0–3)

## 2025-07-28 PROCEDURE — 82805 BLOOD GASES W/O2 SATURATION: CPT

## 2025-07-28 PROCEDURE — 99284 EMERGENCY DEPT VISIT MOD MDM: CPT

## 2025-07-28 PROCEDURE — 74176 CT ABD & PELVIS W/O CONTRAST: CPT

## 2025-07-28 PROCEDURE — 6370000000 HC RX 637 (ALT 250 FOR IP): Performed by: STUDENT IN AN ORGANIZED HEALTH CARE EDUCATION/TRAINING PROGRAM

## 2025-07-28 PROCEDURE — 36415 COLL VENOUS BLD VENIPUNCTURE: CPT

## 2025-07-28 PROCEDURE — 82962 GLUCOSE BLOOD TEST: CPT

## 2025-07-28 RX ORDER — DOCUSATE SODIUM 100 MG/1
100 CAPSULE, LIQUID FILLED ORAL 2 TIMES DAILY PRN
Qty: 20 CAPSULE | Refills: 0 | Status: SHIPPED | OUTPATIENT
Start: 2025-07-28 | End: 2025-08-07

## 2025-07-28 RX ORDER — POLYETHYLENE GLYCOL 3350 17 G/17G
17 POWDER, FOR SOLUTION ORAL ONCE
Status: COMPLETED | OUTPATIENT
Start: 2025-07-28 | End: 2025-07-28

## 2025-07-28 RX ORDER — CALCIUM POLYCARBOPHIL 625 MG
1 TABLET ORAL
Qty: 30 TABLET | Refills: 0 | Status: SHIPPED | OUTPATIENT
Start: 2025-07-28 | End: 2025-08-07

## 2025-07-28 RX ORDER — POLYETHYLENE GLYCOL 3350 17 G/17G
17 POWDER, FOR SOLUTION ORAL DAILY PRN
Qty: 10 EACH | Refills: 0 | Status: SHIPPED | OUTPATIENT
Start: 2025-07-28 | End: 2025-08-07

## 2025-07-28 RX ORDER — DOCUSATE SODIUM 100 MG/1
100 CAPSULE, LIQUID FILLED ORAL ONCE
Status: COMPLETED | OUTPATIENT
Start: 2025-07-28 | End: 2025-07-28

## 2025-07-28 RX ADMIN — Medication 1 PACKET: at 22:35

## 2025-07-28 RX ADMIN — POLYETHYLENE GLYCOL (3350) 17 G: 17 POWDER, FOR SOLUTION ORAL at 22:35

## 2025-07-28 RX ADMIN — DOCUSATE SODIUM 100 MG: 100 CAPSULE, LIQUID FILLED ORAL at 22:35

## 2025-07-28 ASSESSMENT — PAIN DESCRIPTION - LOCATION: LOCATION: ABDOMEN

## 2025-07-28 ASSESSMENT — PAIN DESCRIPTION - ORIENTATION: ORIENTATION: ANTERIOR

## 2025-07-28 ASSESSMENT — PAIN DESCRIPTION - DESCRIPTORS: DESCRIPTORS: ACHING

## 2025-07-28 ASSESSMENT — PAIN SCALES - GENERAL: PAINLEVEL_OUTOF10: 10

## 2025-07-28 NOTE — ED PROVIDER NOTES
for 10 days, Disp: 10 tablet, Rfl: 0    metoclopramide (REGLAN) 10 MG tablet, Take 1 tablet by mouth 3 times daily as needed (nausea), Disp: 120 tablet, Rfl: 3    Ergocalciferol (VITAMIN D) 54521 units CAPS, Take 50,000 Units by mouth once a week for 5 doses, Disp: 5 capsule, Rfl: 0    metoprolol tartrate (LOPRESSOR) 25 MG tablet, Take 1 tablet by mouth 2 times daily, Disp: , Rfl:     cloNIDine (CATAPRES) 0.2 MG tablet, Take 1 tablet by mouth 2 times daily, Disp: , Rfl:     insulin lispro, 1 Unit Dial, (HUMALOG/ADMELOG) 100 UNIT/ML SOPN, 3 times daily (before meals) Sliding scale, Disp: , Rfl:     acetaminophen (TYLENOL) 325 MG tablet, Take 2 tablets by mouth every 4 hours as needed for Pain, Disp: , Rfl:     miconazole (MICOTIN) 2 % powder, Apply topically 2 times daily., Disp: 45 g, Rfl: 1    omeprazole (PRILOSEC) 40 MG delayed release capsule, Take 1 capsule by mouth daily, Disp: 30 capsule, Rfl: 0    hydrALAZINE (APRESOLINE) 100 MG tablet, Take 1 tablet by mouth every 8 hours, Disp: 90 tablet, Rfl: 0    ferrous sulfate (IRON 325) 325 (65 Fe) MG tablet, Take 1 tablet by mouth daily (with breakfast), Disp: 30 tablet, Rfl: 3    atorvastatin (LIPITOR) 20 MG tablet, Take 1 tablet by mouth nightly, Disp: 30 tablet, Rfl: 0    albuterol sulfate HFA (VENTOLIN HFA) 108 (90 Base) MCG/ACT inhaler, Inhale 1 puff into the lungs 4 times daily Indications: shortness of breath, Disp: , Rfl:     amLODIPine (NORVASC) 10 MG tablet, Take 1 tablet by mouth daily, Disp: 30 tablet, Rfl: 3    senna (SENOKOT) 8.6 MG tablet, Take 2 tablets by mouth daily, Disp: , Rfl:     levothyroxine (SYNTHROID) 88 MCG tablet, Take 1 tablet by mouth Daily, Disp: , Rfl:     aspirin 81 MG chewable tablet, Take 1 tablet by mouth daily (Patient taking differently: Take 1 tablet by mouth nightly), Disp: 30 tablet, Rfl: 3  Previous Medications    ACETAMINOPHEN (TYLENOL) 325 MG TABLET    Take 2 tablets by mouth every 4 hours as needed for Pain    ALBUTEROL

## 2025-07-29 NOTE — DISCHARGE INSTRUCTIONS
You can use MiraLAX, Dulcolax, fiber to help with your symptoms.  You can also use over-the-counter enemas or other stool softeners or laxatives.  Call and follow-up with your family doctor in the next 1-3 days  Return to the ED if your symptoms worsen or you feel you need to be reevaluated

## (undated) DEVICE — GLOVE ORANGE PI 8   MSG9080

## (undated) DEVICE — YANKAUER,BULB TIP,W/O VENT,RIGID,STERILE: Brand: MEDLINE

## (undated) DEVICE — JELLY,LUBE,STERILE,FLIP TOP,TUBE,2-OZ: Brand: MEDLINE

## (undated) DEVICE — SYRINGE MED 20ML STD CLR PLAS LUERLOCK TIP N CTRL DISP

## (undated) DEVICE — MAT ABSRB W28XL48IN C6L FLR ULT W POLY BK

## (undated) DEVICE — Z INACTIVE USE 2863041 SPONGE GZ W4XL4IN 100% COT 16 PLY RADPQ HIGHLY ABSRB

## (undated) DEVICE — Z DISCONTINUED NO SUB IDED TRAY PREP DRY W/ PREM GLV 2 APPL 6 SPNG 2 UNDPD 1 OVERWRAP

## (undated) DEVICE — CATHETER URET 5FR L70CM TIP 8FR OPN END CONE TIP INJ HUB

## (undated) DEVICE — GUIDEWIRE UROLOGICAL STR 3 CM 0.038 INX150 CM NIT SENSOR

## (undated) DEVICE — SEAL ENDOSCP INSTR 7FR BX SELF SEAL

## (undated) DEVICE — ELECTRODE ES AD CRDLSS PT RET REM POLYHESIVE

## (undated) DEVICE — Z INACTIVE USE 2660663 SOLUTION IRRIG 1000ML STRIL H2O USP PLAS POUR BTL

## (undated) DEVICE — DRAINBAG,ANTI-REFLUX TOWER,L/F,2000ML,LL: Brand: MEDLINE

## (undated) DEVICE — SYRINGE 20ML LL S/C 50

## (undated) DEVICE — COUNTER NDL 30 COUNT FOAM STRP SGL MAG

## (undated) DEVICE — DRAPE,ABDOMINAL,MAJOR,STERILE: Brand: MEDLINE

## (undated) DEVICE — GUIDEWIRE ENDOSCP L150CM DIA0.035IN TIP 3CM PTFE NIT

## (undated) DEVICE — SOLUTION IV IRRIG WATER 1000ML POUR BRL 2F7114

## (undated) DEVICE — LINER,SEMI-RIGID,3000CC,50EA/CS: Brand: MEDLINE

## (undated) DEVICE — CATHETER,FOLEY,100%SILICONE,16FR,10ML,LF: Brand: MEDLINE

## (undated) DEVICE — SET IRRIG L94IN ID0.281IN W/ 4.5IN DST FLX CONN 2 LD ON OFF

## (undated) DEVICE — GLOVE ORANGE PI 7 1/2   MSG9075

## (undated) DEVICE — GOWN,ECLIPSE,POLYRNF,BRTHSLV,L,30/CS: Brand: MEDLINE

## (undated) DEVICE — MARKER SURG SKIN UTIL REGULAR/FINE 2 TIP W/ RUL AND 9 LBL

## (undated) DEVICE — YANKAUER,FLEXIBLE HANDLE,REGLR CAPACITY: Brand: MEDLINE INDUSTRIES, INC.

## (undated) DEVICE — PREMIUM WET SKIN PREP TRAY: Brand: MEDLINE INDUSTRIES, INC.

## (undated) DEVICE — DBD-PACK,CYSTOSCOPY,PK VI,AURORA: Brand: MEDLINE

## (undated) DEVICE — GOWN,SIRUS,POLYRNF,BRTHSLV,XLN/XL,20/CS: Brand: MEDLINE

## (undated) DEVICE — CONE TIP URETERAL CATHETER: Brand: URETERAL CATHETER

## (undated) DEVICE — SPONGE LAP W18XL18IN WHT COT 4 PLY FLD STRUNG RADPQ DISP ST 2 PER PACK

## (undated) DEVICE — SYRINGE INFL 60ML DISP ALLIANCE II

## (undated) DEVICE — PACK,CYSTOSCOPY,PK I,AURORA: Brand: MEDLINE

## (undated) DEVICE — Z INACTIVE USE 2635503 SOLUTION IRRIG 3000ML ST H2O USP UROMATIC PLAS CONT

## (undated) DEVICE — TUBING SET SUCTION

## (undated) DEVICE — GLOVE SURG SZ 65 CRM LTX FREE POLYISOPRENE POLYMER BEAD ANTI

## (undated) DEVICE — SYRINGE MED 10ML LUERLOCK TIP W/O SFTY DISP

## (undated) DEVICE — Z INACTIVE NO ACTIVE SUPPLIER APPLICATOR MEDICATED 26 CC TINT HI-LITE ORNG STRL CHLORAPREP

## (undated) DEVICE — TOWEL,OR,DSP,ST,BLUE,STD,6/PK,12PK/CS: Brand: MEDLINE

## (undated) DEVICE — SPONGE GZ W4XL8IN COT WVN 12 PLY

## (undated) DEVICE — GLOVE SURG SZ 6 CRM LTX FREE POLYISOPRENE POLYMER BEAD ANTI

## (undated) DEVICE — PACK,BASIC,IX: Brand: MEDLINE

## (undated) DEVICE — CONTAINER,SPECIMEN,OR STERILE,4OZ: Brand: MEDLINE

## (undated) DEVICE — FORCEPS BX L240CM JAW DIA2.8MM L CAP W/ NDL MIC MESH TOOTH

## (undated) DEVICE — TRAY PREP DRY W/ PREM GLV 2 APPL 6 SPNG 2 UNDPD 1 OVERWRAP

## (undated) DEVICE — TUBING, SUCTION, 9/32" X 10', STRAIGHT: Brand: MEDLINE

## (undated) DEVICE — SUTURE PERMAHAND SZ 2-0 L18IN NONABSORBABLE BLK L26MM FS 685G

## (undated) DEVICE — CATHETER URET 5FR L70CM POLYUR CONE FLX TIP KINK RESIST W/

## (undated) DEVICE — PENCIL ES CRD L10FT HND SWCHING ROCK SWCH W/ EDGE COAT BLDE

## (undated) DEVICE — ENDOSCOPY KIT: Brand: MEDLINE INDUSTRIES, INC.

## (undated) DEVICE — TUBE CULTURE LF UNIFORM BOTTOM STER

## (undated) DEVICE — PAD,ABDOMINAL,5"X9",ST,LF,25/BX: Brand: MEDLINE INDUSTRIES, INC.

## (undated) DEVICE — SINGLE PORT MANIFOLD: Brand: NEPTUNE 2

## (undated) DEVICE — UROLOGIC DRAIN BAG: Brand: UNBRANDED

## (undated) DEVICE — STERILE POLYISOPRENE POWDER-FREE SURGICAL GLOVES: Brand: PROTEXIS

## (undated) DEVICE — PACK,CYSTOSCOPY,PK VI: Brand: MEDLINE

## (undated) DEVICE — INTENDED FOR TISSUE SEPARATION, AND OTHER PROCEDURES THAT REQUIRE A SHARP SURGICAL BLADE TO PUNCTURE OR CUT.: Brand: BARD-PARKER ® STAINLESS STEEL BLADES

## (undated) DEVICE — GLOVE SURG SZ 7 L12IN FNGR THK79MIL GRN LTX FREE

## (undated) DEVICE — ESOPHAGEAL BALLOON DILATATION CATHETER: Brand: CRE FIXED WIRE